# Patient Record
Sex: FEMALE | Race: WHITE | Employment: OTHER | URBAN - METROPOLITAN AREA
[De-identification: names, ages, dates, MRNs, and addresses within clinical notes are randomized per-mention and may not be internally consistent; named-entity substitution may affect disease eponyms.]

---

## 2017-06-22 ENCOUNTER — TELEPHONE (OUTPATIENT)
Dept: PULMONOLOGY | Age: 67
End: 2017-06-22

## 2017-10-18 ENCOUNTER — TELEPHONE (OUTPATIENT)
Dept: PULMONOLOGY | Age: 67
End: 2017-10-18

## 2017-10-18 PROBLEM — J18.9 PNEUMONIA DUE TO ORGANISM: Status: ACTIVE | Noted: 2017-10-18

## 2017-10-18 PROBLEM — R07.9 CHEST PAIN: Status: ACTIVE | Noted: 2017-10-18

## 2017-10-18 RX ORDER — PREDNISONE 10 MG/1
10 TABLET ORAL 3 TIMES DAILY
Qty: 15 TABLET | Refills: 0 | Status: ON HOLD | OUTPATIENT
Start: 2017-10-18 | End: 2017-10-23 | Stop reason: HOSPADM

## 2017-10-18 RX ORDER — IPRATROPIUM BROMIDE AND ALBUTEROL SULFATE 2.5; .5 MG/3ML; MG/3ML
3 SOLUTION RESPIRATORY (INHALATION) EVERY 6 HOURS PRN
Qty: 360 ML | Refills: 0 | Status: SHIPPED | OUTPATIENT
Start: 2017-10-18 | End: 2017-12-13 | Stop reason: SDUPTHER

## 2017-10-18 RX ORDER — DOXYCYCLINE HYCLATE 100 MG/1
100 CAPSULE ORAL 2 TIMES DAILY
Qty: 14 CAPSULE | Refills: 0 | Status: ON HOLD | OUTPATIENT
Start: 2017-10-18 | End: 2017-10-23 | Stop reason: HOSPADM

## 2017-10-18 NOTE — TELEPHONE ENCOUNTER
Patient and patient's daughter Wilberto Andesron informed of 's response with verbal understanding. Orders pending please sign. Patient wants to know if okay to take Duoneb as well for current symptoms? Patient request order send to pharmacy if okay.

## 2017-10-18 NOTE — TELEPHONE ENCOUNTER
Do you have the following symptoms? Shortness of Breath  Yes  Wheezing Yes  Cough Yes                  Cough Characteristics:                           Productive    Yes                           Sputum Color    White to yellow brownish                           Hemoptysis   No                           Consistency of sputum   No    Fever:   No  Temp:  Chills/Sweats:  No  What other symptoms are you having?: Patient states weak and hurts to take in a deep breath. Light headed when she gets up. How long have you had these symptoms? Times four days and getting worse per patient     Pharmacy: [facility]           Review medications and allergies: Allergies? Atenolol                   Currently on Antibiotics? (Drug/Dose/Frequency and how long on?)No                   Systemic Steroids? (Drug/Dose/Frequency and how long on?) Prednisone 4 mg daily     Pull in last office note. Assessment: 10/11/16 OV       1. Chronic bronchitis/cough with cylindrical bronchiectasis, much better on Daliresp      Not addressed today:  2. Rheumatoid arthritis on Embrel and MTX  3. Pulmonary Nodule 8 mm left lower lobe, has decreased in size on serial imaging  4. Positive tobacco history  5. Diabetes                          Plan:      1. Continue Daliresp; off Singulair  -- Failed Singulair, Failed Dulera, Failed Spiriva, Failed Advair.                             [facility]

## 2017-10-18 NOTE — TELEPHONE ENCOUNTER
Allergies   Allergen Reactions    Atenolol      Cough        Prednisone 30 mg daily for 5 days, then resume prior    Doxycycline 100 mg po bid for 7 days.

## 2017-10-19 PROBLEM — A41.9 SEPSIS (HCC): Status: ACTIVE | Noted: 2017-10-19

## 2017-10-19 PROBLEM — Z87.891 FORMER SMOKER: Chronic | Status: ACTIVE | Noted: 2017-10-19

## 2017-10-19 PROBLEM — R73.9 ACUTE HYPERGLYCEMIA: Status: ACTIVE | Noted: 2017-10-19

## 2017-10-19 PROBLEM — E87.8 HYPOCHLOREMIA: Status: ACTIVE | Noted: 2017-10-19

## 2017-10-19 PROBLEM — E87.1 HYPONATREMIA: Status: ACTIVE | Noted: 2017-10-19

## 2017-10-19 PROBLEM — E11.9 DM2 (DIABETES MELLITUS, TYPE 2) (HCC): Chronic | Status: ACTIVE | Noted: 2017-10-19

## 2017-10-19 PROBLEM — D64.9 CHRONIC ANEMIA: Chronic | Status: ACTIVE | Noted: 2017-10-19

## 2017-10-23 ENCOUNTER — TELEPHONE (OUTPATIENT)
Dept: PULMONOLOGY | Age: 67
End: 2017-10-23

## 2017-10-23 DIAGNOSIS — J18.9 COMMUNITY ACQUIRED PNEUMONIA OF LEFT LOWER LOBE OF LUNG: Primary | ICD-10-CM

## 2017-10-23 NOTE — TELEPHONE ENCOUNTER
Patient is currently IP. Assessment: Hospital Note  10/23/17  · Community acquired pneumonia, LLL  · Chronic bronchiectasis with acute exacerbation  · Cough  · Shortness of breath         Plan:  · ABX: CTX D#6 and completed azithromycin D5.   Give dose of CTX today and home with 3 additional days omnicef 300 mg po bid  · Inhaled bronchodilators   · Prednisone 30 mg daily, taper to off over 10 days  · Continue daliresp  · Dextromethorphan OTC  · See me in 4 weeks with CXR

## 2017-11-22 ENCOUNTER — OFFICE VISIT (OUTPATIENT)
Dept: PULMONOLOGY | Age: 67
End: 2017-11-22

## 2017-11-22 ENCOUNTER — HOSPITAL ENCOUNTER (OUTPATIENT)
Dept: OTHER | Age: 67
Discharge: HOME | End: 2017-11-22
Attending: INTERNAL MEDICINE | Admitting: INTERNAL MEDICINE

## 2017-11-22 VITALS
RESPIRATION RATE: 16 BRPM | BODY MASS INDEX: 28.92 KG/M2 | HEART RATE: 77 BPM | SYSTOLIC BLOOD PRESSURE: 139 MMHG | WEIGHT: 202 LBS | DIASTOLIC BLOOD PRESSURE: 76 MMHG | OXYGEN SATURATION: 98 % | HEIGHT: 70 IN | TEMPERATURE: 97.9 F

## 2017-11-22 DIAGNOSIS — J47.9 CYLINDRICAL BRONCHIECTASIS (HCC): ICD-10-CM

## 2017-11-22 DIAGNOSIS — J18.9 COMMUNITY ACQUIRED PNEUMONIA OF LEFT LOWER LOBE OF LUNG: ICD-10-CM

## 2017-11-22 DIAGNOSIS — R05.3 CHRONIC COUGH: ICD-10-CM

## 2017-11-22 DIAGNOSIS — J18.9 PNEUMONIA OF LEFT LOWER LOBE DUE TO INFECTIOUS ORGANISM: ICD-10-CM

## 2017-11-22 DIAGNOSIS — Z23 NEED FOR INFLUENZA VACCINATION: Primary | ICD-10-CM

## 2017-11-22 PROCEDURE — 99214 OFFICE O/P EST MOD 30 MIN: CPT | Performed by: INTERNAL MEDICINE

## 2017-11-22 PROCEDURE — 90662 IIV NO PRSV INCREASED AG IM: CPT | Performed by: INTERNAL MEDICINE

## 2017-11-22 PROCEDURE — G0008 ADMIN INFLUENZA VIRUS VAC: HCPCS | Performed by: INTERNAL MEDICINE

## 2017-11-22 RX ORDER — PREDNISONE 1 MG/1
4 TABLET ORAL DAILY
COMMUNITY
End: 2020-01-08

## 2017-11-22 NOTE — PROGRESS NOTES
CC: cough  HPI    Recent community-acquired pneumonia with acute exacerbation of bronchiectasis, hospitalized for 6 days. PREVIOUS HX  63 yo with 2 month h/o severe waxing and waning cough, treated with avelox without improvement and then changed to Z pac, some improvement; then treated with prednisone and Z pac and then clearly better, then returned and retreated with cough medicine and prednisone. No cough prior to 3 months ago except with sinus drainage. Cough has slowly been improving since last visit here. Objective:   Physical Exam  Blood pressure 139/76, pulse 77, temperature 97.9 °F (36.6 °C), temperature source Oral, resp. rate 16, height 5' 10\" (1.778 m), weight 202 lb (91.6 kg), SpO2 98 %. 'on RA  Constitutional:  No acute distress. HENT:  Oropharynx is clear and moist.   Eyes: Conjunctivae are normal. Pupils equal, round, and reactive to light. No scleral icterus. Neck: No tracheal deviation present. No obvious thyromegaly. Cardiovascular: Normal heart sounds. No right ventricular heave. Left greater than right lower extremity edema. Pulmonary/Chest: No wheezes. No rhonchi. No rales. No decreased breath sounds. No accessory muscle usage or stridor. Abdominal: Soft. Musculoskeletal: No cyanosis. No clubbing. Lymphadenopathy: No cervical or supraclavicular adenopathy. Skin: Skin is warm and dry. Psychiatric: Normal mood and affect. CXR 11/22/17  Resolution of left lower lobe infiltrate    HRCT 2014  Multiple parenchymal pulmonary cysts measuring up to 3 centimeters   with thinned but definable walls are unchanged from priors and had   a basilar prominence. These are more likely pneumatoceles rather   than cystic lung disease related to [facility] or [facility]. There are no thick   walled cavities or intracavitary fluid levels. There is mild   cylindrical bronchiectasis more pronounced in the lower lung   zones.  In the dependent right lower lobe there is a small   subpleural region of articulation and groundglass opacity end   there is a smaller region of groundglass opacity in the dorsal   base of the left lower lobe which is nonspecific but maybe early,   small foci of UIP related to the patient's diagnosis of rheumatoid   arthritis. There are multiple small nodules measuring up to 5 mm which are   nonsignificantly changed. No new or enlarging nodules or   consolidation. There is no significant mediastinal adenopathy or   hilar enlargement. Small paratracheal lymph nodes are unchanged   from January 2012   No pleural abnormalities and there is no focal air trapping. There   are mild changes of central lobular emphysema in the upper lung   zones. PFT Jan 2012  FEV1 2.2 L 77% nl ratio TLC 84% DLCO 18.29 88%  PFT 4-22-13  FEV1 2.26 L 70%  TLC 92% DLCO 19.08 69%  PFT Mar 2014  FEV1 2.08 L 65%  TLC 5.63 88% DLCO 20.05 73%  PFT June 2014 FEV1 2.12 L 69%  TLC 6.14 L 99% DLCO 18.31 68%  PFT April 2016 FEV1 2.24 L FVC 3.2 L TLC 6.36 l DLCO 19.16 72%    Pertussis antibodies are positive    CXR 2-10-16 possible basilar airspace disease seen on lateral view  Assessment:      1. Chronic bronchitis/cough with cylindrical bronchiectasis, Has done well on Daliresp   2. Community acquired pneumonia/abnormal chest x-ray: Is clearly improved on today's imaging  2. Rheumatoid arthritis on Embrel and MTX  3. Pulmonary Nodule 8 mm left lower lobe, has decreased in size on serial imaging  4. Positive tobacco history, 20 pack year quit in 1991  5. Diabetes      Plan:      1. Continue Daliresp; off Singulair  -- Failed Singulair, Failed Dulera, Failed Spiriva, Failed Advair. 2.  No additional follow-up imaging is required  3.   Can see me in 6 months        Best Jang

## 2017-12-15 RX ORDER — IPRATROPIUM BROMIDE AND ALBUTEROL SULFATE 2.5; .5 MG/3ML; MG/3ML
SOLUTION RESPIRATORY (INHALATION)
Qty: 360 ML | Refills: 5 | Status: SHIPPED | OUTPATIENT
Start: 2017-12-15 | End: 2019-07-06

## 2018-05-21 ENCOUNTER — TELEPHONE (OUTPATIENT)
Dept: PULMONOLOGY | Age: 68
End: 2018-05-21

## 2018-06-06 ENCOUNTER — TELEPHONE (OUTPATIENT)
Dept: PULMONOLOGY | Age: 68
End: 2018-06-06

## 2018-06-19 ENCOUNTER — OFFICE VISIT (OUTPATIENT)
Dept: PULMONOLOGY | Age: 68
End: 2018-06-19

## 2018-06-19 VITALS
DIASTOLIC BLOOD PRESSURE: 64 MMHG | HEIGHT: 71 IN | BODY MASS INDEX: 28.7 KG/M2 | SYSTOLIC BLOOD PRESSURE: 122 MMHG | HEART RATE: 87 BPM | WEIGHT: 205 LBS | OXYGEN SATURATION: 98 %

## 2018-06-19 DIAGNOSIS — J42 CHRONIC BRONCHITIS WITH ACUTE EXACERBATION (HCC): ICD-10-CM

## 2018-06-19 DIAGNOSIS — J20.9 CHRONIC BRONCHITIS WITH ACUTE EXACERBATION (HCC): ICD-10-CM

## 2018-06-19 DIAGNOSIS — R05.3 CHRONIC COUGH: Primary | ICD-10-CM

## 2018-06-19 PROCEDURE — 99213 OFFICE O/P EST LOW 20 MIN: CPT | Performed by: INTERNAL MEDICINE

## 2018-06-19 RX ORDER — BLOOD SUGAR DIAGNOSTIC
STRIP MISCELLANEOUS
COMMUNITY
Start: 2018-05-30

## 2018-06-19 RX ORDER — ALBUTEROL SULFATE 90 UG/1
2 AEROSOL, METERED RESPIRATORY (INHALATION) EVERY 4 HOURS PRN
Qty: 1 INHALER | Refills: 3 | Status: SHIPPED | OUTPATIENT
Start: 2018-06-19 | End: 2020-01-20

## 2018-06-19 RX ORDER — PREDNISONE 10 MG/1
TABLET ORAL
Qty: 21 TABLET | Refills: 0 | Status: SHIPPED | OUTPATIENT
Start: 2018-06-19 | End: 2018-12-19 | Stop reason: ALTCHOICE

## 2018-06-19 RX ORDER — CYCLOBENZAPRINE HCL 10 MG
10 TABLET ORAL 2 TIMES DAILY PRN
Status: ON HOLD | COMMUNITY
End: 2021-12-28 | Stop reason: HOSPADM

## 2018-06-19 RX ORDER — BLOOD SUGAR DIAGNOSTIC
STRIP MISCELLANEOUS
Refills: 3 | COMMUNITY
Start: 2018-06-01

## 2018-06-19 RX ORDER — FUROSEMIDE 40 MG/1
40 TABLET ORAL 2 TIMES DAILY
Status: ON HOLD | COMMUNITY
End: 2019-07-09 | Stop reason: SDUPTHER

## 2018-06-19 RX ORDER — HYDROCODONE BITARTRATE AND ACETAMINOPHEN 7.5; 325 MG/1; MG/1
TABLET ORAL
Refills: 0 | COMMUNITY
Start: 2018-05-23 | End: 2020-11-19 | Stop reason: CLARIF

## 2018-06-19 RX ORDER — MELOXICAM 15 MG/1
TABLET ORAL
COMMUNITY
Start: 2018-04-07 | End: 2021-08-04

## 2018-09-14 ENCOUNTER — HOSPITAL ENCOUNTER (OUTPATIENT)
Dept: WOMENS IMAGING | Age: 68
Discharge: HOME | End: 2018-09-14
Payer: MEDICARE

## 2018-09-14 DIAGNOSIS — Z12.31 ENCOUNTER FOR SCREENING MAMMOGRAM FOR MALIGNANT NEOPLASM OF BREAST: ICD-10-CM

## 2018-09-14 PROCEDURE — 77063 BREAST TOMOSYNTHESIS BI: CPT

## 2018-09-20 ENCOUNTER — HOSPITAL ENCOUNTER (OUTPATIENT)
Dept: WOMENS IMAGING | Age: 68
Discharge: HOME | End: 2018-09-20
Payer: MEDICARE

## 2018-09-20 DIAGNOSIS — R92.8 ABNORMAL MAMMOGRAM: ICD-10-CM

## 2018-09-20 PROCEDURE — G0279 TOMOSYNTHESIS, MAMMO: HCPCS

## 2018-11-28 RX ORDER — AZITHROMYCIN 250 MG/1
TABLET, FILM COATED ORAL
Qty: 1 PACKET | Refills: 0 | Status: SHIPPED | OUTPATIENT
Start: 2018-11-28 | End: 2018-12-08

## 2018-11-28 RX ORDER — PREDNISONE 10 MG/1
TABLET ORAL
Qty: 30 TABLET | Refills: 0 | Status: SHIPPED | OUTPATIENT
Start: 2018-11-28 | End: 2018-12-10

## 2018-12-19 ENCOUNTER — OFFICE VISIT (OUTPATIENT)
Dept: PULMONOLOGY | Age: 68
End: 2018-12-19
Payer: MEDICARE

## 2018-12-19 VITALS
SYSTOLIC BLOOD PRESSURE: 150 MMHG | HEART RATE: 80 BPM | BODY MASS INDEX: 29.26 KG/M2 | RESPIRATION RATE: 16 BRPM | HEIGHT: 71 IN | TEMPERATURE: 98.3 F | OXYGEN SATURATION: 97 % | WEIGHT: 209 LBS | DIASTOLIC BLOOD PRESSURE: 82 MMHG

## 2018-12-19 DIAGNOSIS — J47.9 CYLINDRICAL BRONCHIECTASIS (HCC): Primary | ICD-10-CM

## 2018-12-19 PROCEDURE — 99213 OFFICE O/P EST LOW 20 MIN: CPT | Performed by: INTERNAL MEDICINE

## 2018-12-19 RX ORDER — IPRATROPIUM BROMIDE 42 UG/1
2 SPRAY, METERED NASAL 4 TIMES DAILY
Qty: 1 BOTTLE | Refills: 5 | Status: SHIPPED | OUTPATIENT
Start: 2018-12-19 | End: 2019-04-04 | Stop reason: SDUPTHER

## 2018-12-19 RX ORDER — ATORVASTATIN CALCIUM 40 MG/1
40 TABLET, FILM COATED ORAL
COMMUNITY
Start: 2018-10-16

## 2019-04-04 RX ORDER — IPRATROPIUM BROMIDE 42 UG/1
SPRAY, METERED NASAL
Qty: 1 BOTTLE | Refills: 5 | Status: SHIPPED | OUTPATIENT
Start: 2019-04-04 | End: 2020-02-25

## 2019-05-07 ENCOUNTER — TELEPHONE (OUTPATIENT)
Dept: PULMONOLOGY | Age: 69
End: 2019-05-07

## 2019-05-07 RX ORDER — DOXYCYCLINE HYCLATE 100 MG
100 TABLET ORAL 2 TIMES DAILY
Qty: 14 TABLET | Refills: 0 | Status: SHIPPED | OUTPATIENT
Start: 2019-05-07 | End: 2019-05-14

## 2019-05-07 RX ORDER — PREDNISONE 10 MG/1
TABLET ORAL
Qty: 15 TABLET | Refills: 0 | Status: SHIPPED | OUTPATIENT
Start: 2019-05-07 | End: 2019-05-15 | Stop reason: CLARIF

## 2019-05-07 NOTE — TELEPHONE ENCOUNTER
Do you have the following symptoms? Shortness of Breath  yes  Wheezing  yes  Cough  yes                  Cough Characteristics:                           Productive    yes                           Sputum Color    Green                            Hemoptysis   No                            Consistency of sputum   Thick      Fever:    No   Temp:n/a  Chills/Sweats:  No   What other symptoms are you having?:  No     How long have you had these symptoms? 6 weeks      Pharmacy: [facility] in [place] [id number]          Review medications and allergies: Allergies? Allergies   Allergen Reactions    Atenolol      Cough         Currently on Antibiotics? (Drug/Dose/Frequency and how long on?) no   Systemic Steroids? (Drug/Dose/Frequency and how long on?) 4 mg daily for arthritis       Last sick call taken on 6/6/18. Meds prescribed were antibiotic/prednisone). Encourage patient to use nebulizer every 4-6 hours as needed. LOV: 12/19/18  Assessment:   1. Chronic bronchitis/cough with cylindrical bronchiectasis, Has done well on Daliresp       Not addressed today  2. Rheumatoid arthritis on Embrel and MTX  3. Pulmonary Nodule 8 mm left lower lobe, has decreased in size on serial imaging  4. Positive tobacco history, 20 pack year quit in 1991  5. Diabetes                Plan:   1. Continue Daliresp; off Singulair  -- Failed Singulair, Failed Dulera, Failed Spiriva, Failed Advair. 2.  No additional follow-up imaging is required  3.   Can see me in 6 months

## 2019-05-14 ENCOUNTER — TELEPHONE (OUTPATIENT)
Dept: PULMONOLOGY | Age: 69
End: 2019-05-14

## 2019-05-14 DIAGNOSIS — R05.9 COUGH: Primary | ICD-10-CM

## 2019-05-14 NOTE — TELEPHONE ENCOUNTER
Patient informed of 's response with verbal understanding. Orders are pending please review and sign.

## 2019-05-15 ENCOUNTER — HOSPITAL ENCOUNTER (OUTPATIENT)
Dept: GENERAL RADIOLOGY | Age: 69
Discharge: HOME | End: 2019-05-15
Payer: MEDICARE

## 2019-05-15 ENCOUNTER — HOSPITAL ENCOUNTER (OUTPATIENT)
Age: 69
Discharge: HOME | End: 2019-05-15
Payer: MEDICARE

## 2019-05-15 ENCOUNTER — OFFICE VISIT (OUTPATIENT)
Dept: PULMONOLOGY | Age: 69
End: 2019-05-15
Payer: MEDICARE

## 2019-05-15 VITALS
HEIGHT: 71 IN | BODY MASS INDEX: 28.28 KG/M2 | TEMPERATURE: 98.2 F | RESPIRATION RATE: 16 BRPM | OXYGEN SATURATION: 93 % | WEIGHT: 202 LBS | DIASTOLIC BLOOD PRESSURE: 77 MMHG | HEART RATE: 86 BPM | SYSTOLIC BLOOD PRESSURE: 138 MMHG

## 2019-05-15 DIAGNOSIS — R05.9 COUGH: ICD-10-CM

## 2019-05-15 DIAGNOSIS — R93.89 ABNORMAL CXR: Primary | ICD-10-CM

## 2019-05-15 DIAGNOSIS — J42 CHRONIC BRONCHITIS, UNSPECIFIED CHRONIC BRONCHITIS TYPE (HCC): ICD-10-CM

## 2019-05-15 PROCEDURE — 71046 X-RAY EXAM CHEST 2 VIEWS: CPT

## 2019-05-15 PROCEDURE — 99214 OFFICE O/P EST MOD 30 MIN: CPT | Performed by: INTERNAL MEDICINE

## 2019-05-15 RX ORDER — PREDNISONE 10 MG/1
TABLET ORAL
Qty: 30 TABLET | Refills: 0 | Status: SHIPPED | OUTPATIENT
Start: 2019-05-15 | End: 2019-06-04 | Stop reason: CLARIF

## 2019-05-15 RX ORDER — LEVOFLOXACIN 500 MG/1
500 TABLET, FILM COATED ORAL DAILY
Qty: 10 TABLET | Refills: 0 | Status: SHIPPED | OUTPATIENT
Start: 2019-05-15 | End: 2019-05-25

## 2019-05-15 NOTE — PROGRESS NOTES
doxy    Not addressed today  2. Rheumatoid arthritis on Embrel and MTX  3. Pulmonary Nodule 8 mm left lower lobe, has decreased in size on serial imaging  4. Positive tobacco history, 20 pack year quit in 1991  5. Diabetes      Plan:      1. Continue Daliresp; off Singulair  -- Failed Singulair, Failed Dulera, Failed Spiriva, Failed Advair. 2.  CT CHEST now no IV dye  3. Call for result of CT  4. Start Levaquin and prednisone   5.   Sputum culture        Rennie Habermann

## 2019-05-15 NOTE — PATIENT INSTRUCTIONS
Start Levaquin and prednisone 30 today    Get CT, call with result.   Can update Dr. Ady Otero at that point on if doing better, then update around 7 days to decide what to do next with prednisone

## 2019-05-16 ENCOUNTER — TELEPHONE (OUTPATIENT)
Dept: PULMONOLOGY | Age: 69
End: 2019-05-16

## 2019-05-16 PROCEDURE — 87205 SMEAR GRAM STAIN: CPT

## 2019-05-16 PROCEDURE — 87070 CULTURE OTHR SPECIMN AEROBIC: CPT

## 2019-05-16 RX ORDER — FLUCONAZOLE 100 MG/1
100 TABLET ORAL DAILY
Qty: 8 TABLET | Refills: 0 | Status: SHIPPED | OUTPATIENT
Start: 2019-05-16 | End: 2019-05-23

## 2019-05-16 NOTE — TELEPHONE ENCOUNTER
Char is on back order need different med. Pt states she saw Dr Blake White yesterday , requesting message to Dr Blake White to please order something for her \"thrush\".     Pharmacy : Pharmacy:  [id number], [address] 05/15/19  Assessment:   1.  Chronic bronchitis/cough with cylindrical bronchiectasis, Has done well on Daliresp  - now with acute exacerbation   2.  Abnormal CXR  3.  Community acquired pneumonia - failed doxy     Not addressed today  2.  Rheumatoid arthritis on Embrel and MTX  3.  Pulmonary Nodule 8 mm left lower lobe, has decreased in size on serial imaging  4.  Positive tobacco history, 20 pack year quit in 1991  5.  Diabetes     Plan:   1.  Continue Daliresp; off Singulair  -- Failed Singulair, Failed Dulera, Failed Spiriva, Failed Advair.    2.  CT CHEST now no IV dye  3.  Call for result of CT  4.  Start Levaquin and prednisone   5.  Sputum culture                                                  5/16/19 2:32 PM   Royal Alex LPN routed this conversation to [facility]  Haidee Bamberger, MD Haidee Bamberger, MD   to [facility]         5/16/19 3:54 PM   Note      Nystatin sent, confirm pharmacy has available. [id number] [facility] [id number] [place]           5/16/19 4:15 PM   Note      Patient informed of 's response with verbal understanding.

## 2019-05-17 ENCOUNTER — HOSPITAL ENCOUNTER (OUTPATIENT)
Age: 69
Setting detail: SPECIMEN
Discharge: HOME | End: 2019-05-17
Payer: MEDICARE

## 2019-05-17 DIAGNOSIS — J42 CHRONIC BRONCHITIS, UNSPECIFIED CHRONIC BRONCHITIS TYPE (HCC): ICD-10-CM

## 2019-05-17 DIAGNOSIS — R05.9 COUGH: Primary | ICD-10-CM

## 2019-05-17 DIAGNOSIS — R05.9 COUGH: ICD-10-CM

## 2019-05-17 PROCEDURE — 87070 CULTURE OTHR SPECIMN AEROBIC: CPT

## 2019-05-17 PROCEDURE — 87205 SMEAR GRAM STAIN: CPT

## 2019-05-19 LAB
CULTURE, RESPIRATORY: NORMAL
GRAM STAIN RESULT: NORMAL

## 2019-05-20 ENCOUNTER — HOSPITAL ENCOUNTER (OUTPATIENT)
Dept: CT IMAGING | Age: 69
Discharge: HOME | End: 2019-05-20
Payer: MEDICARE

## 2019-05-20 DIAGNOSIS — R05.9 COUGH: ICD-10-CM

## 2019-05-20 DIAGNOSIS — R93.89 ABNORMAL CXR: ICD-10-CM

## 2019-05-20 PROCEDURE — 71250 CT THORAX DX C-: CPT

## 2019-05-20 NOTE — RESULT ENCOUNTER NOTE
Tell pt CT shows changes of infection - could be bacterial or viral.   Levaquin should cover any bacterial etiology.

## 2019-06-04 ENCOUNTER — HOSPITAL ENCOUNTER (OUTPATIENT)
Age: 69
Discharge: HOME | End: 2019-06-04
Payer: MEDICARE

## 2019-06-04 ENCOUNTER — OFFICE VISIT (OUTPATIENT)
Dept: PULMONOLOGY | Age: 69
End: 2019-06-04
Payer: MEDICARE

## 2019-06-04 VITALS
SYSTOLIC BLOOD PRESSURE: 135 MMHG | RESPIRATION RATE: 16 BRPM | WEIGHT: 209 LBS | HEART RATE: 96 BPM | DIASTOLIC BLOOD PRESSURE: 69 MMHG | HEIGHT: 71 IN | TEMPERATURE: 98.1 F | BODY MASS INDEX: 29.26 KG/M2 | OXYGEN SATURATION: 96 %

## 2019-06-04 DIAGNOSIS — R60.0 LOWER EXTREMITY EDEMA: ICD-10-CM

## 2019-06-04 DIAGNOSIS — R93.89 ABNORMAL CT OF THE CHEST: ICD-10-CM

## 2019-06-04 DIAGNOSIS — Z51.81 THERAPEUTIC DRUG MONITORING: ICD-10-CM

## 2019-06-04 DIAGNOSIS — J47.9 CYLINDRICAL BRONCHIECTASIS (HCC): Primary | ICD-10-CM

## 2019-06-04 LAB
ALBUMIN SERPL-MCNC: 3.3 G/DL (ref 3.4–5)
ANION GAP SERPL CALCULATED.3IONS-SCNC: 14 MMOL/L (ref 3–16)
BUN BLDV-MCNC: 30 MG/DL (ref 7–20)
CALCIUM SERPL-MCNC: 8.8 MG/DL (ref 8.3–10.6)
CHLORIDE BLD-SCNC: 93 MMOL/L (ref 99–110)
CO2: 26 MMOL/L (ref 21–32)
CREAT SERPL-MCNC: 1.5 MG/DL (ref 0.6–1.2)
GFR AFRICAN AMERICAN: 42
GFR NON-AFRICAN AMERICAN: 35
GLUCOSE BLD-MCNC: 229 MG/DL (ref 70–99)
PHOSPHORUS: 3.2 MG/DL (ref 2.5–4.9)
POTASSIUM SERPL-SCNC: 4.6 MMOL/L (ref 3.5–5.1)
SODIUM BLD-SCNC: 133 MMOL/L (ref 136–145)

## 2019-06-04 PROCEDURE — 80069 RENAL FUNCTION PANEL: CPT

## 2019-06-04 PROCEDURE — 36415 COLL VENOUS BLD VENIPUNCTURE: CPT

## 2019-06-04 PROCEDURE — 99214 OFFICE O/P EST MOD 30 MIN: CPT | Performed by: INTERNAL MEDICINE

## 2019-06-04 RX ORDER — METOLAZONE 5 MG/1
5 TABLET ORAL DAILY
Qty: 30 TABLET | Refills: 0 | Status: SHIPPED | OUTPATIENT
Start: 2019-06-04 | End: 2019-06-26 | Stop reason: SDUPTHER

## 2019-06-04 NOTE — PATIENT INSTRUCTIONS
Check weight every day 1st thing in the morning, record  Goal is to drop 7-10 pounds of fluid over the next 3-4 days. If breathing/cough worsen despite fluid off, let Dr. Ady Otero know.     If feeling better, then goal is to stay at the same weight and f/u with Dr. Sanjuana Dunham

## 2019-06-04 NOTE — PROGRESS NOTES
CC: cough  HPI    Treated in March with prednisone and Zpac, then received prednisone/doxy early May 2019 and at first was getting better, but beginning Shane May 10 developed fatigue, malaise, worsening cough, chest congestion. She was treated by me for acute pneumonia with Levaquin and prednisone with improvement. Subsequently 4 days ago she noted that despite taking Lasix 80 BID + spironolactone, has stopped making much urine with this and has 4 day h/o markedly increased lower extremity edema. She has associated shortness of breath and cough. PREVIOUS HX  61 yo with 2 month h/o severe waxing and waning cough, treated with avelox without improvement and then changed to Z pac, some improvement; then treated with prednisone and Z pac and then clearly better, then returned and retreated with cough medicine and prednisone. No cough prior to 3 months ago except with sinus drainage. Cough has slowly been improving since last visit here. Objective:   Physical Exam  Blood pressure 135/69, pulse 96, temperature 98.1 °F (36.7 °C), temperature source Oral, resp. rate 16, height 5' 10.5\" (1.791 m), weight 209 lb (94.8 kg), SpO2 96 %. 'on RA  Constitutional:  No acute distress. HENT:  Oropharynx is clear and moist.   Eyes: Conjunctivae are normal. Pupils equal, round, and reactive to light. No scleral icterus. Neck: No tracheal deviation present. No obvious thyromegaly. Cardiovascular: Normal heart sounds. No right ventricular heave. +++ Left greater than right lower extremity edema. Pulmonary/Chest: diffuse wheezes. No rhonchi. LLL rales. No decreased breath sounds. No accessory muscle usage or stridor. Abdominal: Soft. Musculoskeletal: No cyanosis. No clubbing. Lymphadenopathy: No cervical or supraclavicular adenopathy. Skin: Skin is warm and dry. Psychiatric: Normal mood and affect.     CXR 6/3/18  Possible bilataral LL pneumonia v atelectasis    HRCT 2014  Multiple parenchymal pulmonary cysts measuring up to 3 centimeters   with thinned but definable walls are unchanged from priors and had   a basilar prominence. These are more likely pneumatoceles rather   than cystic lung disease related to [facility] or [facility]. There are no thick   walled cavities or intracavitary fluid levels. There is mild   cylindrical bronchiectasis more pronounced in the lower lung   zones. In the dependent right lower lobe there is a small   subpleural region of articulation and groundglass opacity end   there is a smaller region of groundglass opacity in the dorsal   base of the left lower lobe which is nonspecific but maybe early,   small foci of UIP related to the patient's diagnosis of rheumatoid   arthritis. There are multiple small nodules measuring up to 5 mm which are   nonsignificantly changed. No new or enlarging nodules or   consolidation. There is no significant mediastinal adenopathy or   hilar enlargement. Small paratracheal lymph nodes are unchanged   from January 2012   No pleural abnormalities and there is no focal air trapping. There   are mild changes of central lobular emphysema in the upper lung   zones. PFT Jan 2012  FEV1 2.2 L 77% nl ratio TLC 84% DLCO 18.29 88%  PFT 4-22-13  FEV1 2.26 L 70%  TLC 92% DLCO 19.08 69%  PFT Mar 2014  FEV1 2.08 L 65%  TLC 5.63 88% DLCO 20.05 73%  PFT June 2014 FEV1 2.12 L 69%  TLC 6.14 L 99% DLCO 18.31 68%  PFT April 2016 FEV1 2.24 L FVC 3.2 L TLC 6.36 l DLCO 19.16 72%    Pertussis antibodies are positive    CT CHEST 5/20/19  Impression   1. Multiple new nonspecific ground-glass nodular opacities throughout both   lungs, with more amorphous ground-glass opacities noted within the anterior   right upper lobe.  These are most likely infectious or inflammatory in   etiology.  Suggest appropriate clinical treatment, and short-term chest CT   follow-up in 6-8 weeks to ensure resolution of these multifocal nodular   opacities.    2. Mild subpleural atelectasis within the bilateral lower lobes, without   acute airspace consolidation. 3. Mild chronic bibasilar pleural and parenchymal scarring. 4. Multiple stable chronic pneumatoceles within the bilateral lung bases. Assessment:      1. Chronic bronchitis/cough with cylindrical bronchiectasis, Has done well on Daliresp    2. Abnormal CT CHEST  3. Lower extremity edema, markedly worse with dropping urine output   4. Need for therapeutic drug monitoring    Not addressed today  · Rheumatoid arthritis on Embrel and MTX  · Pulmonary Nodule 8 mm left lower lobe, has decreased in size on serial imaging  · Positive tobacco history, 20 pack year quit in 1991  · Diabetes      Plan:      1. Renal panel today   Add metolazone 5 mg daily for 3 days. Daily weights and goal drop 7-10 pounds   Call with result. His shortness of breath and cough not improving consider fiberoptic bronchoscopy  Continue Daliresp; off Singulair  -- Failed Singulair, Failed Dulera, Failed Spiriva, Failed Advair.     2.  Will need to schedule follow-up CT imaging at follow-up visit        Kenya Newman

## 2019-06-05 ENCOUNTER — TELEPHONE (OUTPATIENT)
Dept: PULMONOLOGY | Age: 69
End: 2019-06-05

## 2019-06-05 NOTE — RESULT ENCOUNTER NOTE
Tell pt her kidney function is significant worsened compared with 1 year prior. She needs a repeat STAT renal ordered for tomorrow, please text result to my cell phone.

## 2019-06-06 ENCOUNTER — HOSPITAL ENCOUNTER (OUTPATIENT)
Age: 69
Discharge: HOME | End: 2019-06-06
Payer: MEDICARE

## 2019-06-06 ENCOUNTER — TELEPHONE (OUTPATIENT)
Dept: PULMONOLOGY | Age: 69
End: 2019-06-06

## 2019-06-06 DIAGNOSIS — R94.4 ABNORMAL KIDNEY FUNCTION STUDY: Primary | ICD-10-CM

## 2019-06-06 DIAGNOSIS — R94.4 ABNORMAL KIDNEY FUNCTION STUDY: ICD-10-CM

## 2019-06-06 LAB
ALBUMIN SERPL-MCNC: 3.4 G/DL (ref 3.4–5)
ANION GAP SERPL CALCULATED.3IONS-SCNC: 13 MMOL/L (ref 3–16)
BUN BLDV-MCNC: 28 MG/DL (ref 7–20)
CALCIUM SERPL-MCNC: 9.1 MG/DL (ref 8.3–10.6)
CHLORIDE BLD-SCNC: 91 MMOL/L (ref 99–110)
CO2: 31 MMOL/L (ref 21–32)
CREAT SERPL-MCNC: 1.3 MG/DL (ref 0.6–1.2)
GFR AFRICAN AMERICAN: 49
GFR NON-AFRICAN AMERICAN: 41
GLUCOSE BLD-MCNC: 167 MG/DL (ref 70–99)
PHOSPHORUS: 3.9 MG/DL (ref 2.5–4.9)
POTASSIUM SERPL-SCNC: 4.2 MMOL/L (ref 3.5–5.1)
SODIUM BLD-SCNC: 135 MMOL/L (ref 136–145)

## 2019-06-06 PROCEDURE — 36415 COLL VENOUS BLD VENIPUNCTURE: CPT

## 2019-06-06 PROCEDURE — 80069 RENAL FUNCTION PANEL: CPT

## 2019-06-10 ENCOUNTER — HOSPITAL ENCOUNTER (OUTPATIENT)
Age: 69
Discharge: HOME | End: 2019-06-10
Payer: MEDICARE

## 2019-06-10 DIAGNOSIS — R94.4 ABNORMAL KIDNEY FUNCTION STUDY: ICD-10-CM

## 2019-06-10 LAB
ALBUMIN SERPL-MCNC: 3.3 G/DL (ref 3.4–5)
ANION GAP SERPL CALCULATED.3IONS-SCNC: 14 MMOL/L (ref 3–16)
BUN BLDV-MCNC: 30 MG/DL (ref 7–20)
CALCIUM SERPL-MCNC: 9 MG/DL (ref 8.3–10.6)
CHLORIDE BLD-SCNC: 87 MMOL/L (ref 99–110)
CO2: 30 MMOL/L (ref 21–32)
CREAT SERPL-MCNC: 1.2 MG/DL (ref 0.6–1.2)
GFR AFRICAN AMERICAN: 54
GFR NON-AFRICAN AMERICAN: 45
GLUCOSE BLD-MCNC: 222 MG/DL (ref 70–99)
PHOSPHORUS: 3.6 MG/DL (ref 2.5–4.9)
POTASSIUM SERPL-SCNC: 3.8 MMOL/L (ref 3.5–5.1)
SODIUM BLD-SCNC: 131 MMOL/L (ref 136–145)

## 2019-06-10 PROCEDURE — 80069 RENAL FUNCTION PANEL: CPT

## 2019-06-10 PROCEDURE — 36415 COLL VENOUS BLD VENIPUNCTURE: CPT

## 2019-06-10 NOTE — RESULT ENCOUNTER NOTE
Tell pt her kidney function has improved over the last 6 days. Has her weight come down, does she have less swelling? How is her cough?

## 2019-06-11 ENCOUNTER — HOSPITAL ENCOUNTER (OUTPATIENT)
Age: 69
Discharge: HOME | End: 2019-06-11
Payer: MEDICARE

## 2019-06-11 ENCOUNTER — HOSPITAL ENCOUNTER (OUTPATIENT)
Dept: GENERAL RADIOLOGY | Age: 69
Discharge: HOME | End: 2019-06-11
Payer: MEDICARE

## 2019-06-11 ENCOUNTER — OFFICE VISIT (OUTPATIENT)
Dept: PULMONOLOGY | Age: 69
End: 2019-06-11
Payer: MEDICARE

## 2019-06-11 VITALS
OXYGEN SATURATION: 97 % | DIASTOLIC BLOOD PRESSURE: 70 MMHG | TEMPERATURE: 98.4 F | SYSTOLIC BLOOD PRESSURE: 128 MMHG | RESPIRATION RATE: 16 BRPM | WEIGHT: 202 LBS | HEART RATE: 79 BPM | HEIGHT: 71 IN | BODY MASS INDEX: 28.28 KG/M2

## 2019-06-11 DIAGNOSIS — R93.89 ABNORMAL CT OF THE CHEST: ICD-10-CM

## 2019-06-11 DIAGNOSIS — J42 CHRONIC BRONCHITIS, UNSPECIFIED CHRONIC BRONCHITIS TYPE (HCC): Primary | ICD-10-CM

## 2019-06-11 DIAGNOSIS — R05.9 COUGH: ICD-10-CM

## 2019-06-11 DIAGNOSIS — J42 CHRONIC BRONCHITIS, UNSPECIFIED CHRONIC BRONCHITIS TYPE (HCC): ICD-10-CM

## 2019-06-11 DIAGNOSIS — R94.4 ABNORMAL RENAL FUNCTION TEST: ICD-10-CM

## 2019-06-11 PROCEDURE — 71046 X-RAY EXAM CHEST 2 VIEWS: CPT

## 2019-06-11 PROCEDURE — 99214 OFFICE O/P EST MOD 30 MIN: CPT | Performed by: INTERNAL MEDICINE

## 2019-06-11 RX ORDER — PREDNISONE 10 MG/1
TABLET ORAL
Qty: 30 TABLET | Refills: 0 | Status: SHIPPED | OUTPATIENT
Start: 2019-06-11 | End: 2019-07-06

## 2019-06-11 NOTE — PATIENT INSTRUCTIONS
Take one more dose metolazone to target weight of 200 lb. Now. In future, if weight goes above 202#, give one dose of metolazone. If you need more than 2 doses of metolazone in one week, call your physician.     Set up an appointment with either Dr. Merry Amanda or Dr. Obdulio Daigle    CXR today    Fiberoptic bronchoscopy this week, blood draw same day (can be done in endoscopy)

## 2019-06-11 NOTE — PROGRESS NOTES
CC: cough  HPI    Interval History #2:  With loss of 10 pounds of fluid with metolazone given at last visit, her kidney function actually improved. However, her cough and shortness of breath are same. Severe cough, congested. She continues on embrel and MTX. Interval History #1:Treated in March with prednisone and Zpac, then received prednisone/doxy early May 2019 and at first was getting better, but beginning Shane May 10 developed fatigue, malaise, worsening cough, chest congestion. She was treated by me for acute pneumonia with Levaquin and prednisone with improvement. Subsequently 4 days ago she noted that despite taking Lasix 80 BID + spironolactone, has stopped making much urine with this and has 4 day h/o markedly increased lower extremity edema. She has associated shortness of breath and cough. Cough is described as severe. PREVIOUS HX  61 yo with 2 month h/o severe waxing and waning cough, treated with avelox without improvement and then changed to Z pac, some improvement; then treated with prednisone and Z pac and then clearly better, then returned and retreated with cough medicine and prednisone. No cough prior to 3 months ago except with sinus drainage. Cough has slowly been improving since last visit here. Objective:   Physical Exam  Blood pressure 128/70, pulse 79, temperature 98.4 °F (36.9 °C), temperature source Oral, resp. rate 16, height 5' 10.5\" (1.791 m), weight 202 lb (91.6 kg), SpO2 97 %. 'on RA  Constitutional:  No acute distress. HENT:  Oropharynx is clear and moist.   Eyes: Conjunctivae are normal. Pupils equal, round, and reactive to light. No scleral icterus. Neck: No tracheal deviation present. No obvious thyromegaly. Cardiovascular: Normal heart sounds. No right ventricular heave. +++ Left greater than right lower extremity edema. Pulmonary/Chest: diffuse wheezes. No rhonchi. LLL rales. No decreased breath sounds. No accessory muscle usage or stridor. Abdominal: Soft. Musculoskeletal: No cyanosis. No clubbing. Lymphadenopathy: No cervical or supraclavicular adenopathy. Skin: Skin is warm and dry. Psychiatric: Normal mood and affect. CXR 6/3/18  Possible bilataral LL pneumonia v atelectasis    HRCT 2014  Multiple parenchymal pulmonary cysts measuring up to 3 centimeters   with thinned but definable walls are unchanged from priors and had   a basilar prominence. These are more likely pneumatoceles rather   than cystic lung disease related to [facility] or [facility]. There are no thick   walled cavities or intracavitary fluid levels. There is mild   cylindrical bronchiectasis more pronounced in the lower lung   zones. In the dependent right lower lobe there is a small   subpleural region of articulation and groundglass opacity end   there is a smaller region of groundglass opacity in the dorsal   base of the left lower lobe which is nonspecific but maybe early,   small foci of UIP related to the patient's diagnosis of rheumatoid   arthritis. There are multiple small nodules measuring up to 5 mm which are   nonsignificantly changed. No new or enlarging nodules or   consolidation. There is no significant mediastinal adenopathy or   hilar enlargement. Small paratracheal lymph nodes are unchanged   from January 2012   No pleural abnormalities and there is no focal air trapping. There   are mild changes of central lobular emphysema in the upper lung   zones. PFT Jan 2012  FEV1 2.2 L 77% nl ratio TLC 84% DLCO 18.29 88%  PFT 4-22-13  FEV1 2.26 L 70%  TLC 92% DLCO 19.08 69%  PFT Mar 2014  FEV1 2.08 L 65%  TLC 5.63 88% DLCO 20.05 73%  PFT June 2014 FEV1 2.12 L 69%  TLC 6.14 L 99% DLCO 18.31 68%  PFT April 2016 FEV1 2.24 L FVC 3.2 L TLC 6.36 l DLCO 19.16 72%    Pertussis antibodies are positive    CT CHEST 5/20/19  Impression   1.  Multiple new nonspecific ground-glass nodular opacities throughout both   lungs, with more amorphous ground-glass opacities noted within the anterior   right upper lobe. [place] are most likely infectious or inflammatory in   etiology.  Suggest appropriate clinical treatment, and short-term chest CT   follow-up in 6-8 weeks to ensure resolution of these multifocal nodular   opacities. 2. Mild subpleural atelectasis within the bilateral lower lobes, without   acute airspace consolidation. 3. Mild chronic bibasilar pleural and parenchymal scarring. 4. Multiple stable chronic pneumatoceles within the bilateral lung bases. Assessment:      · Chronic bronchitis/cough with cylindrical bronchiectasis, Has done well on Daliresp    · Abnormal CT CHEST  · Lower extremity edema, markedly worse with dropping urine output   · Acute kidney failure - better with diuresis, has not returned completely to her prior baseline  · Rheumatoid arthritis on Embrel and MTX and 4 mg prednisone    · Pulmonary Nodule 8 mm left lower lobe, has decreased in size on serial imaging  · Positive tobacco history, 20 pack year quit in 1991  · Diabetes      Plan:      · PA LAT CXR today  · Fiberoptic bronchoscopy this week, send studies for PCP as well given immunosuppression  · Renal panel same day as fiberoptic bronchoscopy, notify endoscopy  · Metolazone 5 mg only on days with weight greater than 202#   · Continue Daliresp; off Singulair  -- Failed Singulair, Failed Dulera, Failed Spiriva, Failed Advair. · Will eventually need to schedule follow-up CT imaging at follow-up visit  · The risks and benefits of fiberoptic bronchoscopy were specifically discussed, including the goal of obtaining a diagnosis, the risks of bleeding, infection, pneumothorax, lung collapse, hospitalization and death. We also discussed the risks of sedation/anesthesia. It is my assessment that the risk of the invasive procedure is outweighed by the benefit.   Patient was counseled regarding the recommended procedure, alternatives to the procedure, and possible consequences of not having any

## 2019-06-12 ENCOUNTER — HOSPITAL ENCOUNTER (OUTPATIENT)
Age: 69
Setting detail: OUTPATIENT SURGERY
Discharge: HOME | End: 2019-06-12
Attending: INTERNAL MEDICINE | Admitting: INTERNAL MEDICINE
Payer: MEDICARE

## 2019-06-12 VITALS
RESPIRATION RATE: 16 BRPM | HEIGHT: 70 IN | WEIGHT: 201 LBS | HEART RATE: 71 BPM | OXYGEN SATURATION: 96 % | DIASTOLIC BLOOD PRESSURE: 54 MMHG | SYSTOLIC BLOOD PRESSURE: 105 MMHG | TEMPERATURE: 97.6 F | BODY MASS INDEX: 28.77 KG/M2

## 2019-06-12 LAB
ALBUMIN SERPL-MCNC: 3.6 G/DL (ref 3.4–5)
ANION GAP SERPL CALCULATED.3IONS-SCNC: 13 MMOL/L (ref 3–16)
APPEARANCE BAL (LAVAGE): ABNORMAL
BUN BLDV-MCNC: 24 MG/DL (ref 7–20)
CALCIUM SERPL-MCNC: 9.5 MG/DL (ref 8.3–10.6)
CHLORIDE BLD-SCNC: 86 MMOL/L (ref 99–110)
CLOT EVALUATION BAL: ABNORMAL
CO2: 29 MMOL/L (ref 21–32)
COLOR LAVAGE: COLORLESS
CREAT SERPL-MCNC: 1 MG/DL (ref 0.6–1.2)
EOSIN: 2 %
GFR AFRICAN AMERICAN: >60
GFR NON-AFRICAN AMERICAN: 55
GLUCOSE BLD-MCNC: 252 MG/DL (ref 70–99)
GLUCOSE BLD-MCNC: 278 MG/DL (ref 70–99)
LYMPHOCYTES, BAL: 2 % (ref 5–10)
MACROPHAGES, BAL: 4 % (ref 90–95)
NUMBER OF CELLS COUNTED BAL (LAVAGE): 100
PERFORMED ON: ABNORMAL
PHOSPHORUS: 3.1 MG/DL (ref 2.5–4.9)
POTASSIUM SERPL-SCNC: 4.2 MMOL/L (ref 3.5–5.1)
RBC, BAL: <1000 /CUMM
SEGMENTED NEUTROPHILS, BAL: 92 % (ref 5–10)
SODIUM BLD-SCNC: 128 MMOL/L (ref 136–145)
WBC/EPI CELLS BAL: 601 /CUMM

## 2019-06-12 PROCEDURE — 88312 SPECIAL STAINS GROUP 1: CPT

## 2019-06-12 PROCEDURE — 31624 DX BRONCHOSCOPE/LAVAGE: CPT | Performed by: INTERNAL MEDICINE

## 2019-06-12 PROCEDURE — 36415 COLL VENOUS BLD VENIPUNCTURE: CPT

## 2019-06-12 PROCEDURE — 6360000002 HC RX W HCPCS: Performed by: INTERNAL MEDICINE

## 2019-06-12 PROCEDURE — 3609010800 HC BRONCHOSCOPY ALVEOLAR LAVAGE: Performed by: INTERNAL MEDICINE

## 2019-06-12 PROCEDURE — 87205 SMEAR GRAM STAIN: CPT

## 2019-06-12 PROCEDURE — 7100000011 HC PHASE II RECOVERY - ADDTL 15 MIN: Performed by: INTERNAL MEDICINE

## 2019-06-12 PROCEDURE — 2580000003 HC RX 258: Performed by: INTERNAL MEDICINE

## 2019-06-12 PROCEDURE — 87206 SMEAR FLUORESCENT/ACID STAI: CPT

## 2019-06-12 PROCEDURE — 87116 MYCOBACTERIA CULTURE: CPT

## 2019-06-12 PROCEDURE — 87102 FUNGUS ISOLATION CULTURE: CPT

## 2019-06-12 PROCEDURE — G0500 MOD SEDAT ENDO SERVICE >5YRS: HCPCS | Performed by: INTERNAL MEDICINE

## 2019-06-12 PROCEDURE — 87107 FUNGI IDENTIFICATION MOLD: CPT

## 2019-06-12 PROCEDURE — 7100000010 HC PHASE II RECOVERY - FIRST 15 MIN: Performed by: INTERNAL MEDICINE

## 2019-06-12 PROCEDURE — 2709999900 HC NON-CHARGEABLE SUPPLY: Performed by: INTERNAL MEDICINE

## 2019-06-12 PROCEDURE — 99152 MOD SED SAME PHYS/QHP 5/>YRS: CPT | Performed by: INTERNAL MEDICINE

## 2019-06-12 PROCEDURE — 88112 CYTOPATH CELL ENHANCE TECH: CPT

## 2019-06-12 PROCEDURE — 80069 RENAL FUNCTION PANEL: CPT

## 2019-06-12 PROCEDURE — 87106 FUNGI IDENTIFICATION YEAST: CPT

## 2019-06-12 PROCEDURE — 89051 BODY FLUID CELL COUNT: CPT

## 2019-06-12 PROCEDURE — 87070 CULTURE OTHR SPECIMN AEROBIC: CPT

## 2019-06-12 PROCEDURE — 88305 TISSUE EXAM BY PATHOLOGIST: CPT

## 2019-06-12 PROCEDURE — 31645 BRNCHSC W/THER ASPIR 1ST: CPT | Performed by: INTERNAL MEDICINE

## 2019-06-12 PROCEDURE — 87015 SPECIMEN INFECT AGNT CONCNTJ: CPT

## 2019-06-12 RX ORDER — SODIUM CHLORIDE, SODIUM LACTATE, POTASSIUM CHLORIDE, CALCIUM CHLORIDE 600; 310; 30; 20 MG/100ML; MG/100ML; MG/100ML; MG/100ML
INJECTION, SOLUTION INTRAVENOUS CONTINUOUS PRN
Status: COMPLETED | OUTPATIENT
Start: 2019-06-12 | End: 2019-06-12

## 2019-06-12 RX ORDER — MIDAZOLAM HYDROCHLORIDE 5 MG/ML
INJECTION INTRAMUSCULAR; INTRAVENOUS PRN
Status: DISCONTINUED | OUTPATIENT
Start: 2019-06-12 | End: 2019-06-12 | Stop reason: ALTCHOICE

## 2019-06-12 RX ORDER — FENTANYL CITRATE 50 UG/ML
INJECTION, SOLUTION INTRAMUSCULAR; INTRAVENOUS PRN
Status: DISCONTINUED | OUTPATIENT
Start: 2019-06-12 | End: 2019-06-12 | Stop reason: ALTCHOICE

## 2019-06-12 ASSESSMENT — PAIN DESCRIPTION - DESCRIPTORS: DESCRIPTORS: ACHING

## 2019-06-12 ASSESSMENT — PAIN SCALES - GENERAL: PAINLEVEL_OUTOF10: 0

## 2019-06-12 ASSESSMENT — PAIN - FUNCTIONAL ASSESSMENT: PAIN_FUNCTIONAL_ASSESSMENT: 0-10

## 2019-06-12 NOTE — H&P
Fiberoptic bronchoscopy history:     Persistent tracheobronchitis, not responding to therapy. Needs lung cultures, airway evaluation. BAL RUL anterior segment. Patient is allergic to atenolol. Past Medical History:   Diagnosis Date    Arthritis     Back pain     Bronchiectasis ([facility] 10/15/2014    Chronic rheumatic arthritis ([facility]     Diabetes mellitus (HCC)     GERD (gastroesophageal reflux disease)     Hypertension     Pneumonia     Psoriasis     hands and feet    RA (rheumatoid arthritis) ([facility]        Past Surgical History:   Procedure Laterality Date    ARTERY SURGERY  5/30/13    left temporal artery biopsy    BRONCHOSCOPY      CATARACT REMOVAL      ELBOW SURGERY      FOOT SURGERY      HERNIA REPAIR      KNEE ARTHROSCOPY      left    KYPHOSIS SURGERY      LAMINECTOMY      MANDIBLE FRACTURE SURGERY      SEPTOPLASTY  5/7/2013    FESS with balloon    SPINAL FUSION      TUBAL LIGATION      UPPER GASTROINTESTINAL ENDOSCOPY  4/8/2014    Dilitation       Allergies   Allergen Reactions    Atenolol      Cough         No current facility-administered medications on file prior to encounter.       Current Outpatient Medications on File Prior to Encounter   Medication Sig Dispense Refill    aspirin EC 81 MG EC tablet Take 1 tablet by mouth daily      predniSONE (DELTASONE) 10 MG tablet 3 tabs daily for 10 days 30 tablet 0    metolazone (ZAROXOLYN) 5 MG tablet Take 1 tablet by mouth daily 30 tablet 0    ipratropium (ATROVENT) 0.06 % nasal spray 2 SPRAYS BY NASAL ROUTE 2-4 TIMES DAILY 1 Bottle 5    atorvastatin (LIPITOR) 40 MG tablet Take 40 mg by mouth      furosemide (LASIX) 40 MG tablet Take 40 mg by mouth 2 times daily      cyclobenzaprine (FLEXERIL) 10 MG tablet Take 10 mg by mouth      Insulin Syringe-Needle U-100 31G X 5/16\" 0.5 ML MISC USE 5 TIMES DAILY      meloxicam (MOBIC) 15 MG tablet Historical Medication      ACCU-CHEK CEDRICK PLUS strip TEST 4 TIMES DAILY  3    HYDROcodone-acetaminophen (NORCO) 7.5-325 MG per tablet TAKE 1 TABLET BY MOUTH 3 TIMES A DAY AS NEEDED  0    Roflumilast (DALIRESP) 500 MCG tablet Take 500 mcg by mouth daily 90 tablet 3    albuterol sulfate  (90 Base) MCG/ACT inhaler Inhale 2 puffs into the lungs every 4 hours as needed for Wheezing 1 Inhaler 3    metoprolol succinate (TOPROL XL) 25 MG extended release tablet Take 25 mg by mouth daily      rosuvastatin (CRESTOR) 10 MG tablet Take 10 mg by mouth daily      ipratropium-albuterol (DUONEB) 0.5-2.5 (3) MG/3ML SOLN nebulizer solution INHALE 3 MLS INTO LUNGS EVERY 6 HOURS AS NEEDED FOR SHORTNESS OF BREATH 360 mL 5    predniSONE (DELTASONE) 1 MG tablet Take 4 mg by mouth daily      insulin glargine (LANTUS) 100 UNIT/ML injection vial Inject 25 Units into the skin nightly      gabapentin (NEURONTIN) 300 MG capsule Take 1 capsule by mouth nightly      insulin lispro (HUMALOG) 100 UNIT/ML injection vial Inject 0-12 Units into the skin 3 times daily (with meals)      spironolactone (ALDACTONE) 50 MG tablet Take 1 tablet by mouth daily 30 tablet 3    Misc. Devices (ACAPELLA) MISC Take 1 Device by mouth as needed 1 each 0    fluticasone (FLONASE) 50 MCG/ACT nasal spray INHALE 2 SPRAYS IN EACH NOSTRIL DAILY 1 Bottle 5    cetirizine (ZYRTEC) 10 MG tablet Take 10 mg by mouth daily.  etanercept (ENBREL) 50 MG/ML injection Inject 50 mg into the skin every 7 days.  omeprazole (PRILOSEC) 40 MG capsule Take 40 mg by mouth daily       folic acid (FOLVITE) 1 MG tablet Take 1 mg by mouth daily.  methotrexate 2.5 MG tablet Take 2.5 mg by mouth 3 Every other week            Medication list was reviewed and updated as needed in Epic     reports that she quit smoking about 28 years ago. Her smoking use included cigarettes. She has a 20.00 pack-year smoking history.  She has never used smokeless tobacco.    family history includes Asthma in an other family member; Diabetes in her brother, mother, and sister; Heart Disease in her brother and brother; Hypertension in her mother. Blood pressure (!) 156/58, pulse 80, temperature 97.2 °F (36.2 °C), temperature source Temporal, resp. rate 16, height 5' 10\" (1.778 m), weight 201 lb (91.2 kg), SpO2 97 %. HENT: Airway patent and reviewed. Mallampati 2  Cardiovascular: Normal rate, regular rhythm, normal heart sounds. Pulmonary/Chest: No wheezes. No rhonchi. No rales. Abdominal: Soft. Bowel sounds are normal. No distension. ASA CLASS       III. Severe Systemic Disease      Sedation plan: Moderate      Post Procedure Plan   Return to same level of care   ______________________     The risks and benefits of fiberoptic bronchoscopy were specifically discussed, including the goal of obtaining a diagnosis, the risks of bleeding, infection, pneumothorax, lung collapse, hospitalization and death. We also discussed the risks of sedation/anesthesia. It is my assessment that the risk of the invasive procedure is outweighed by the benefit. Patient was counseled regarding the recommended procedure, alternatives to the procedure, and possible consequences of not having any evaluation performed.

## 2019-06-12 NOTE — PROCEDURES
PROCEDURE:  BRONCHOSCOPY WITH BRONCHOALVEOLAR LAVAGE    Moderate sedation with fentanyl and versed was used. Total moderate sedation time in minutes was 12 (CPT 25643). Patient was monitored continuously 1:1 throughout the entire procedure while sedation was administered & until she had sufficiently recovered from the sedatives. The scope was passed with ease via the mouth. A complete airway inspection was performed. Tracheobronchomalacia was noted. There was significant collapse of the trachea with respiration, probably 25%, with more severe 75% collapse during cough. The right mainstem was the most affected site, with 50-75% occlusion with exhalation and 75-95% occlusion with cough. No endobronchial lesions were identified. There was a small amount of very thick viscous secretions, mostly concentrated in the left mainstem and left lower lobe airways. Therapeutic aspiration of mucus plugging in the left lower lobe was performed. Washings were obtained throughout the airways. A Bronchoalveolar lavage was obtained from the right upper lobe anterior segment with good return. Estimated blood loss was less than 5 ml. The patient tolerated the procedure well. Recovery will be per endoscopy protocol. FOLLOW UP:  Cultures and cytology in  three to five days.    Will need PSG to see if we can qualify for nighttime CPAP

## 2019-06-14 LAB
CULTURE, RESPIRATORY: NORMAL
CULTURE, RESPIRATORY: NORMAL
GRAM STAIN RESULT: NORMAL
GRAM STAIN RESULT: NORMAL

## 2019-06-14 RX ORDER — AZITHROMYCIN 250 MG/1
250 TABLET, FILM COATED ORAL DAILY
Qty: 30 TABLET | Refills: 1 | Status: SHIPPED | OUTPATIENT
Start: 2019-06-14 | End: 2019-06-19 | Stop reason: SDUPTHER

## 2019-06-14 NOTE — RESULT ENCOUNTER NOTE
Tell pt there are no resistant organisms on her fiberoptic bronchoscopy but I would like her to start a daily antibiotic azithromycin for next 30 days. See me as scheduled.

## 2019-06-19 ENCOUNTER — OFFICE VISIT (OUTPATIENT)
Dept: PULMONOLOGY | Age: 69
End: 2019-06-19
Payer: MEDICARE

## 2019-06-19 ENCOUNTER — HOSPITAL ENCOUNTER (OUTPATIENT)
Age: 69
Discharge: HOME | End: 2019-06-19
Payer: MEDICARE

## 2019-06-19 VITALS
OXYGEN SATURATION: 98 % | WEIGHT: 197.4 LBS | SYSTOLIC BLOOD PRESSURE: 122 MMHG | BODY MASS INDEX: 27.64 KG/M2 | RESPIRATION RATE: 20 BRPM | HEART RATE: 77 BPM | HEIGHT: 71 IN | DIASTOLIC BLOOD PRESSURE: 78 MMHG

## 2019-06-19 DIAGNOSIS — J44.9 CHRONIC OBSTRUCTIVE PULMONARY DISEASE, UNSPECIFIED COPD TYPE (HCC): ICD-10-CM

## 2019-06-19 DIAGNOSIS — G47.30 SLEEP APNEA, UNSPECIFIED TYPE: Primary | ICD-10-CM

## 2019-06-19 DIAGNOSIS — J39.8 TRACHEOBRONCHOMALACIA: ICD-10-CM

## 2019-06-19 LAB
EKG ATRIAL RATE: 73 BPM
EKG DIAGNOSIS: NORMAL
EKG P AXIS: 71 DEGREES
EKG P-R INTERVAL: 152 MS
EKG Q-T INTERVAL: 398 MS
EKG QRS DURATION: 90 MS
EKG QTC CALCULATION (BAZETT): 438 MS
EKG R AXIS: 4 DEGREES
EKG T AXIS: 64 DEGREES
EKG VENTRICULAR RATE: 73 BPM

## 2019-06-19 PROCEDURE — 93005 ELECTROCARDIOGRAM TRACING: CPT | Performed by: INTERNAL MEDICINE

## 2019-06-19 PROCEDURE — 93010 ELECTROCARDIOGRAM REPORT: CPT | Performed by: INTERNAL MEDICINE

## 2019-06-19 PROCEDURE — 99214 OFFICE O/P EST MOD 30 MIN: CPT | Performed by: INTERNAL MEDICINE

## 2019-06-19 RX ORDER — AZITHROMYCIN 250 MG/1
250 TABLET, FILM COATED ORAL DAILY
Qty: 30 TABLET | Refills: 2 | Status: SHIPPED | OUTPATIENT
Start: 2019-06-19 | End: 2019-07-06

## 2019-06-19 ASSESSMENT — SLEEP AND FATIGUE QUESTIONNAIRES
HOW LIKELY ARE YOU TO NOD OFF OR FALL ASLEEP WHILE LYING DOWN TO REST IN THE AFTERNOON WHEN CIRCUMSTANCES PERMIT: 0
HOW LIKELY ARE YOU TO NOD OFF OR FALL ASLEEP IN A CAR, WHILE STOPPED FOR A FEW MINUTES IN TRAFFIC: 0
HOW LIKELY ARE YOU TO NOD OFF OR FALL ASLEEP WHILE SITTING QUIETLY AFTER LUNCH WITHOUT ALCOHOL: 0
HOW LIKELY ARE YOU TO NOD OFF OR FALL ASLEEP WHEN YOU ARE A PASSENGER IN A CAR FOR AN HOUR WITHOUT A BREAK: 0
HOW LIKELY ARE YOU TO NOD OFF OR FALL ASLEEP WHILE SITTING AND TALKING TO SOMEONE: 0
HOW LIKELY ARE YOU TO NOD OFF OR FALL ASLEEP WHILE SITTING AND READING: 2
NECK CIRCUMFERENCE (INCHES): 14.25
ESS TOTAL SCORE: 2
HOW LIKELY ARE YOU TO NOD OFF OR FALL ASLEEP WHILE WATCHING TV: 0
HOW LIKELY ARE YOU TO NOD OFF OR FALL ASLEEP WHILE SITTING INACTIVE IN A PUBLIC PLACE: 0

## 2019-06-19 NOTE — PROGRESS NOTES
CC: cough  HPI    Interval History #3: Had bronchoscopy that showed tracheobronchomalacia, most severe right mainstem, also had mucous plugging of the left lower lobe. She is feeling better since bronchoscopy. Still with cough but it is improved. Interval History #2:  With loss of 10 pounds of fluid with metolazone given at last visit, her kidney function actually improved. However, her cough and shortness of breath are same. Severe cough, congested. She continues on embrel and MTX. Interval History #1:Treated in March with prednisone and Zpac, then received prednisone/doxy early May 2019 and at first was getting better, but beginning Shane May 10 developed fatigue, malaise, worsening cough, chest congestion. She was treated by me for acute pneumonia with Levaquin and prednisone with improvement. Subsequently 4 days ago she noted that despite taking Lasix 80 BID + spironolactone, has stopped making much urine with this and has 4 day h/o markedly increased lower extremity edema. She has associated shortness of breath and cough. Cough is described as severe. PREVIOUS HX  61 yo with 2 month h/o severe waxing and waning cough, treated with avelox without improvement and then changed to Z pac, some improvement; then treated with prednisone and Z pac and then clearly better, then returned and retreated with cough medicine and prednisone. No cough prior to 3 months ago except with sinus drainage. Cough has slowly been improving since last visit here. ESS is 2      Objective:   Physical Exam  Blood pressure 122/78, pulse 77, resp. rate 20, height 5' 10.5\" (1.791 m), weight 197 lb 6.4 oz (89.5 kg), SpO2 98 %. 'on RA  Constitutional:  No acute distress. HENT:  Oropharynx is clear and moist.  Class IV  Eyes: Conjunctivae are normal. Pupils equal, round, and reactive to light. No scleral icterus. Neck: No tracheal deviation present. No obvious thyromegaly.     14.25 inches  Cardiovascular: Normal 72%    Pertussis antibodies are positive    CT CHEST 5/20/19  Impression   1. Multiple new nonspecific ground-glass nodular opacities throughout both   lungs, with more amorphous ground-glass opacities noted within the anterior   right upper lobe.  These are most likely infectious or inflammatory in   etiology.  Suggest appropriate clinical treatment, and short-term chest CT   follow-up in 6-8 weeks to ensure resolution of these multifocal nodular   opacities. 2. Mild subpleural atelectasis within the bilateral lower lobes, without   acute airspace consolidation. 3. Mild chronic bibasilar pleural and parenchymal scarring. 4. Multiple stable chronic pneumatoceles within the bilateral lung bases. Assessment:      · COPD/Chronic bronchitis/cough with cylindrical bronchiectasis, Has done well on Daliresp   · Snoring, wakes from sleep due to apnea  · Witnessed sleep apnea  · Tracheomalacia    · Abnormal CT CHEST  · Lower extremity edema, markedly better after 3 days metolazone. · Acute kidney failure - better with diuresis   · Rheumatoid arthritis on Embrel and MTX and 4 mg prednisone    · Pulmonary Nodule 8 mm left lower lobe, has decreased in size on serial imaging  · Positive tobacco history, 20 pack year quit in 1991  · Diabetes      Plan:      · Azithromycin daily  · PSG dx witnessed sleep apnea  · Metolazone 5 mg only on days with weight greater than 200#  · Continue Daliresp; off Singulair  -- Failed Singulair, Failed Dulera, Failed Spiriva, Failed Advair.     · Plan follow-up CT imaging -schedule at next visit        [facility]    ·

## 2019-06-26 RX ORDER — METOLAZONE 5 MG/1
TABLET ORAL
Qty: 30 TABLET | Refills: 0 | Status: ON HOLD | OUTPATIENT
Start: 2019-06-26 | End: 2019-07-09 | Stop reason: HOSPADM

## 2019-07-06 ENCOUNTER — HOSPITAL ENCOUNTER (INPATIENT)
Age: 69
LOS: 3 days | Discharge: HOME | DRG: 683 | End: 2019-07-09
Attending: EMERGENCY MEDICINE | Admitting: INTERNAL MEDICINE
Payer: MEDICARE

## 2019-07-06 DIAGNOSIS — N17.9 AKI (ACUTE KIDNEY INJURY) (HCC): Primary | ICD-10-CM

## 2019-07-06 DIAGNOSIS — E87.6 HYPOKALEMIA: ICD-10-CM

## 2019-07-06 LAB
A/G RATIO: 1 (ref 1.1–2.2)
ALBUMIN SERPL-MCNC: 3.8 G/DL (ref 3.4–5)
ALP BLD-CCNC: 98 U/L (ref 40–129)
ALT SERPL-CCNC: 12 U/L (ref 10–40)
ANION GAP SERPL CALCULATED.3IONS-SCNC: 16 MMOL/L (ref 3–16)
AST SERPL-CCNC: 18 U/L (ref 15–37)
BACTERIA: ABNORMAL /HPF
BASOPHILS ABSOLUTE: 0 K/UL (ref 0–0.2)
BASOPHILS RELATIVE PERCENT: 0.7 %
BILIRUB SERPL-MCNC: 0.8 MG/DL (ref 0–1)
BILIRUBIN URINE: NEGATIVE
BLOOD, URINE: ABNORMAL
BUN BLDV-MCNC: 37 MG/DL (ref 7–20)
CALCIUM SERPL-MCNC: 9.3 MG/DL (ref 8.3–10.6)
CASTS 2: ABNORMAL /LPF
CHLORIDE BLD-SCNC: 73 MMOL/L (ref 99–110)
CLARITY: CLEAR
CO2: 32 MMOL/L (ref 21–32)
COLOR: YELLOW
CREAT SERPL-MCNC: 1.5 MG/DL (ref 0.6–1.2)
EOSINOPHILS ABSOLUTE: 0.1 K/UL (ref 0–0.6)
EOSINOPHILS RELATIVE PERCENT: 1.4 %
EPITHELIAL CELLS, UA: ABNORMAL /HPF
GFR AFRICAN AMERICAN: 42
GFR NON-AFRICAN AMERICAN: 35
GLOBULIN: 3.8 G/DL
GLUCOSE BLD-MCNC: 133 MG/DL (ref 70–99)
GLUCOSE BLD-MCNC: 173 MG/DL (ref 70–99)
GLUCOSE URINE: 100 MG/DL
HCT VFR BLD CALC: 31.6 % (ref 36–48)
HEMOGLOBIN: 10.9 G/DL (ref 12–16)
KETONES, URINE: NEGATIVE MG/DL
LEUKOCYTE ESTERASE, URINE: NEGATIVE
LYMPHOCYTES ABSOLUTE: 0.9 K/UL (ref 1–5.1)
LYMPHOCYTES RELATIVE PERCENT: 14.9 %
MAGNESIUM: 2.2 MG/DL (ref 1.8–2.4)
MCH RBC QN AUTO: 30.8 PG (ref 26–34)
MCHC RBC AUTO-ENTMCNC: 34.4 G/DL (ref 31–36)
MCV RBC AUTO: 89.3 FL (ref 80–100)
MICROSCOPIC EXAMINATION: YES
MONOCYTES ABSOLUTE: 0.4 K/UL (ref 0–1.3)
MONOCYTES RELATIVE PERCENT: 6.1 %
MUCUS: ABNORMAL /LPF
NEUTROPHILS ABSOLUTE: 4.6 K/UL (ref 1.7–7.7)
NEUTROPHILS RELATIVE PERCENT: 76.9 %
NITRITE, URINE: NEGATIVE
PDW BLD-RTO: 13.5 % (ref 12.4–15.4)
PERFORMED ON: ABNORMAL
PH UA: 6.5 (ref 5–8)
PLATELET # BLD: 220 K/UL (ref 135–450)
PMV BLD AUTO: 7.6 FL (ref 5–10.5)
POTASSIUM SERPL-SCNC: 2.9 MMOL/L (ref 3.5–5.1)
PROTEIN UA: NEGATIVE MG/DL
RBC # BLD: 3.54 M/UL (ref 4–5.2)
RBC UA: ABNORMAL /HPF (ref 0–2)
SODIUM BLD-SCNC: 121 MMOL/L (ref 136–145)
SPECIFIC GRAVITY UA: <=1.005 (ref 1–1.03)
TOTAL PROTEIN: 7.6 G/DL (ref 6.4–8.2)
TROPONIN: <0.01 NG/ML
URINE REFLEX TO CULTURE: ABNORMAL
URINE TYPE: ABNORMAL
UROBILINOGEN, URINE: 0.2 E.U./DL
WBC # BLD: 6 K/UL (ref 4–11)
WBC UA: ABNORMAL /HPF (ref 0–5)

## 2019-07-06 PROCEDURE — 83735 ASSAY OF MAGNESIUM: CPT

## 2019-07-06 PROCEDURE — 96366 THER/PROPH/DIAG IV INF ADDON: CPT

## 2019-07-06 PROCEDURE — 84484 ASSAY OF TROPONIN QUANT: CPT

## 2019-07-06 PROCEDURE — 2580000003 HC RX 258: Performed by: PHYSICIAN ASSISTANT

## 2019-07-06 PROCEDURE — 99285 EMERGENCY DEPT VISIT HI MDM: CPT

## 2019-07-06 PROCEDURE — 6360000002 HC RX W HCPCS: Performed by: PHYSICIAN ASSISTANT

## 2019-07-06 PROCEDURE — 81001 URINALYSIS AUTO W/SCOPE: CPT

## 2019-07-06 PROCEDURE — 1200000000 HC SEMI PRIVATE

## 2019-07-06 PROCEDURE — 6370000000 HC RX 637 (ALT 250 FOR IP): Performed by: HOSPITALIST

## 2019-07-06 PROCEDURE — 80053 COMPREHEN METABOLIC PANEL: CPT

## 2019-07-06 PROCEDURE — 85025 COMPLETE CBC W/AUTO DIFF WBC: CPT

## 2019-07-06 PROCEDURE — 83036 HEMOGLOBIN GLYCOSYLATED A1C: CPT

## 2019-07-06 PROCEDURE — 6370000000 HC RX 637 (ALT 250 FOR IP): Performed by: PHYSICIAN ASSISTANT

## 2019-07-06 PROCEDURE — 93005 ELECTROCARDIOGRAM TRACING: CPT | Performed by: EMERGENCY MEDICINE

## 2019-07-06 PROCEDURE — 96365 THER/PROPH/DIAG IV INF INIT: CPT

## 2019-07-06 RX ORDER — PREDNISONE 1 MG/1
4 TABLET ORAL DAILY
Status: DISCONTINUED | OUTPATIENT
Start: 2019-07-07 | End: 2019-07-09 | Stop reason: HOSPADM

## 2019-07-06 RX ORDER — CETIRIZINE HYDROCHLORIDE 10 MG/1
10 TABLET ORAL DAILY
Status: DISCONTINUED | OUTPATIENT
Start: 2019-07-07 | End: 2019-07-09 | Stop reason: HOSPADM

## 2019-07-06 RX ORDER — INSULIN GLARGINE 100 [IU]/ML
25 INJECTION, SOLUTION SUBCUTANEOUS NIGHTLY
Status: DISCONTINUED | OUTPATIENT
Start: 2019-07-07 | End: 2019-07-09

## 2019-07-06 RX ORDER — SODIUM CHLORIDE 9 MG/ML
INJECTION, SOLUTION INTRAVENOUS CONTINUOUS
Status: DISCONTINUED | OUTPATIENT
Start: 2019-07-07 | End: 2019-07-07

## 2019-07-06 RX ORDER — IPRATROPIUM BROMIDE 42 UG/1
2 SPRAY, METERED NASAL 2 TIMES DAILY
Status: DISCONTINUED | OUTPATIENT
Start: 2019-07-07 | End: 2019-07-09 | Stop reason: HOSPADM

## 2019-07-06 RX ORDER — SODIUM CHLORIDE 0.9 % (FLUSH) 0.9 %
10 SYRINGE (ML) INJECTION PRN
Status: DISCONTINUED | OUTPATIENT
Start: 2019-07-06 | End: 2019-07-09 | Stop reason: HOSPADM

## 2019-07-06 RX ORDER — POTASSIUM CHLORIDE 7.45 MG/ML
10 INJECTION INTRAVENOUS ONCE
Status: COMPLETED | OUTPATIENT
Start: 2019-07-06 | End: 2019-07-06

## 2019-07-06 RX ORDER — HYDROCODONE BITARTRATE AND ACETAMINOPHEN 7.5; 325 MG/1; MG/1
1 TABLET ORAL EVERY 6 HOURS PRN
Status: DISCONTINUED | OUTPATIENT
Start: 2019-07-06 | End: 2019-07-09 | Stop reason: HOSPADM

## 2019-07-06 RX ORDER — ROSUVASTATIN CALCIUM 10 MG/1
10 TABLET, COATED ORAL DAILY
Status: DISCONTINUED | OUTPATIENT
Start: 2019-07-07 | End: 2019-07-07

## 2019-07-06 RX ORDER — NICOTINE POLACRILEX 4 MG
15 LOZENGE BUCCAL PRN
Status: DISCONTINUED | OUTPATIENT
Start: 2019-07-06 | End: 2019-07-09 | Stop reason: HOSPADM

## 2019-07-06 RX ORDER — SODIUM CHLORIDE 0.9 % (FLUSH) 0.9 %
10 SYRINGE (ML) INJECTION EVERY 12 HOURS SCHEDULED
Status: DISCONTINUED | OUTPATIENT
Start: 2019-07-07 | End: 2019-07-09 | Stop reason: HOSPADM

## 2019-07-06 RX ORDER — POTASSIUM CHLORIDE 20 MEQ/1
40 TABLET, EXTENDED RELEASE ORAL ONCE
Status: COMPLETED | OUTPATIENT
Start: 2019-07-06 | End: 2019-07-06

## 2019-07-06 RX ORDER — ACETAMINOPHEN 325 MG/1
650 TABLET ORAL EVERY 4 HOURS PRN
Status: DISCONTINUED | OUTPATIENT
Start: 2019-07-06 | End: 2019-07-09 | Stop reason: HOSPADM

## 2019-07-06 RX ORDER — CYCLOBENZAPRINE HCL 10 MG
10 TABLET ORAL 3 TIMES DAILY PRN
Status: DISCONTINUED | OUTPATIENT
Start: 2019-07-06 | End: 2019-07-09 | Stop reason: HOSPADM

## 2019-07-06 RX ORDER — DEXTROSE MONOHYDRATE 50 MG/ML
100 INJECTION, SOLUTION INTRAVENOUS PRN
Status: DISCONTINUED | OUTPATIENT
Start: 2019-07-06 | End: 2019-07-09 | Stop reason: HOSPADM

## 2019-07-06 RX ORDER — 0.9 % SODIUM CHLORIDE 0.9 %
1000 INTRAVENOUS SOLUTION INTRAVENOUS ONCE
Status: COMPLETED | OUTPATIENT
Start: 2019-07-06 | End: 2019-07-06

## 2019-07-06 RX ORDER — FLUTICASONE PROPIONATE 50 MCG
2 SPRAY, SUSPENSION (ML) NASAL DAILY
Status: DISCONTINUED | OUTPATIENT
Start: 2019-07-07 | End: 2019-07-09 | Stop reason: HOSPADM

## 2019-07-06 RX ORDER — ALBUTEROL SULFATE 90 UG/1
2 AEROSOL, METERED RESPIRATORY (INHALATION) EVERY 4 HOURS PRN
Status: DISCONTINUED | OUTPATIENT
Start: 2019-07-06 | End: 2019-07-09 | Stop reason: HOSPADM

## 2019-07-06 RX ORDER — FOLIC ACID 1 MG/1
1 TABLET ORAL DAILY
Status: DISCONTINUED | OUTPATIENT
Start: 2019-07-07 | End: 2019-07-09 | Stop reason: HOSPADM

## 2019-07-06 RX ORDER — GABAPENTIN 300 MG/1
300 CAPSULE ORAL NIGHTLY
Status: DISCONTINUED | OUTPATIENT
Start: 2019-07-07 | End: 2019-07-09 | Stop reason: HOSPADM

## 2019-07-06 RX ORDER — ASPIRIN 81 MG/1
81 TABLET ORAL DAILY
Status: DISCONTINUED | OUTPATIENT
Start: 2019-07-07 | End: 2019-07-09 | Stop reason: HOSPADM

## 2019-07-06 RX ORDER — ATORVASTATIN CALCIUM 40 MG/1
40 TABLET, FILM COATED ORAL NIGHTLY
Status: DISCONTINUED | OUTPATIENT
Start: 2019-07-07 | End: 2019-07-09 | Stop reason: HOSPADM

## 2019-07-06 RX ORDER — PANTOPRAZOLE SODIUM 40 MG/1
40 TABLET, DELAYED RELEASE ORAL
Status: DISCONTINUED | OUTPATIENT
Start: 2019-07-07 | End: 2019-07-09 | Stop reason: HOSPADM

## 2019-07-06 RX ORDER — ONDANSETRON 2 MG/ML
4 INJECTION INTRAMUSCULAR; INTRAVENOUS EVERY 6 HOURS PRN
Status: DISCONTINUED | OUTPATIENT
Start: 2019-07-06 | End: 2019-07-09 | Stop reason: HOSPADM

## 2019-07-06 RX ORDER — DEXTROSE MONOHYDRATE 25 G/50ML
12.5 INJECTION, SOLUTION INTRAVENOUS PRN
Status: DISCONTINUED | OUTPATIENT
Start: 2019-07-06 | End: 2019-07-09 | Stop reason: HOSPADM

## 2019-07-06 RX ORDER — METOPROLOL SUCCINATE 25 MG/1
25 TABLET, EXTENDED RELEASE ORAL DAILY
Status: DISCONTINUED | OUTPATIENT
Start: 2019-07-07 | End: 2019-07-09 | Stop reason: HOSPADM

## 2019-07-06 RX ADMIN — POTASSIUM CHLORIDE 10 MEQ: 10 INJECTION, SOLUTION INTRAVENOUS at 20:32

## 2019-07-06 RX ADMIN — POTASSIUM CHLORIDE 40 MEQ: 20 TABLET, EXTENDED RELEASE ORAL at 20:20

## 2019-07-06 RX ADMIN — SODIUM CHLORIDE 1000 ML: 9 INJECTION, SOLUTION INTRAVENOUS at 20:20

## 2019-07-06 ASSESSMENT — PAIN DESCRIPTION - PAIN TYPE: TYPE: CHRONIC PAIN

## 2019-07-06 NOTE — ED PROVIDER NOTES
None   Lifestyle    Physical activity:     Days per week: None     Minutes per session: None    Stress: None   Relationships    Social connections:     Talks on phone: None     Gets together: None     Attends [employer] service: None     Active member of club or organization: None     Attends meetings of clubs or organizations: None     Relationship status: None    Intimate partner violence:     Fear of current or ex partner: None     Emotionally abused: None     Physically abused: None     Forced sexual activity: None   Other Topics Concern    None   Social History Narrative    None       SCREENINGS    Patrice Coma Scale  Eye Opening: Spontaneous  Best Verbal Response: Oriented  Best Motor Response: Obeys commands  Patrice Coma Scale Score: 15        PHYSICAL EXAM    (up to 7 for level 4, 8 or more for level 5)     ED Triage Vitals [07/06/19 1841]   BP Temp Temp Source Pulse Resp SpO2 Height Weight   123/73 97.8 °F (36.6 °C) Oral 65 16 94 % 5' 10.5\" (1.791 m) 198 lb (89.8 kg)       Physical Exam  PHYSICAL EXAM  BP (!) 163/82   Pulse 68   Temp 97.8 °F (36.6 °C) (Oral)   Resp 16   Ht 5' 10.5\" (1.791 m)   Wt 198 lb (89.8 kg)   SpO2 98%   BMI 28.01 kg/m²   GENERAL APPEARANCE: Awake and alert. Cooperative. Appropriately appearing adult elderly female, lying comfortably in exam room, in no acute distress, conversational, moving all extremities without difficulty or assistance. HEAD: Normocephalic. Atraumatic. EYES: PERRL. EOM's grossly intact. No injection or scleral icterus  ENT: Mucous membranes are moist.  Posterior pharynx is patent, nonerythematous. NECK: Supple. No anterior cervical lymphadenopathy. No JVD. HEART: Normal rate, irregular rhythm. . No murmurs. LUNGS: Respirations unlabored however with diffuse inspiratory wheeze. CTAB. Good air exchange. Speaking comfortably in full sentences. ABDOMEN: Soft. Non-distended. Non-tender. No masses. No organomegaly. No guarding or rebound. 10 days              (Please note that portions ofthis note were completed with a voice recognition program.  Efforts were made to edit the dictations but occasionally words are mis-transcribed.)    Via Kyler Wynn  [facility] (electronically signed)       Via Kyler Wynn  [place]  07/06/19 1952

## 2019-07-07 LAB
ANION GAP SERPL CALCULATED.3IONS-SCNC: 11 MMOL/L (ref 3–16)
ANION GAP SERPL CALCULATED.3IONS-SCNC: 12 MMOL/L (ref 3–16)
ANION GAP SERPL CALCULATED.3IONS-SCNC: 16 MMOL/L (ref 3–16)
BASOPHILS ABSOLUTE: 0 K/UL (ref 0–0.2)
BASOPHILS RELATIVE PERCENT: 0.5 %
BUN BLDV-MCNC: 25 MG/DL (ref 7–20)
BUN BLDV-MCNC: 25 MG/DL (ref 7–20)
BUN BLDV-MCNC: 28 MG/DL (ref 7–20)
CALCIUM SERPL-MCNC: 8.3 MG/DL (ref 8.3–10.6)
CALCIUM SERPL-MCNC: 8.8 MG/DL (ref 8.3–10.6)
CALCIUM SERPL-MCNC: 8.8 MG/DL (ref 8.3–10.6)
CHLORIDE BLD-SCNC: 76 MMOL/L (ref 99–110)
CHLORIDE BLD-SCNC: 77 MMOL/L (ref 99–110)
CHLORIDE BLD-SCNC: 81 MMOL/L (ref 99–110)
CO2: 28 MMOL/L (ref 21–32)
CO2: 29 MMOL/L (ref 21–32)
CO2: 30 MMOL/L (ref 21–32)
CREAT SERPL-MCNC: 1 MG/DL (ref 0.6–1.2)
CREAT SERPL-MCNC: 1 MG/DL (ref 0.6–1.2)
CREAT SERPL-MCNC: 1.1 MG/DL (ref 0.6–1.2)
EKG ATRIAL RATE: 62 BPM
EKG DIAGNOSIS: NORMAL
EKG P AXIS: 66 DEGREES
EKG P-R INTERVAL: 176 MS
EKG Q-T INTERVAL: 488 MS
EKG QRS DURATION: 106 MS
EKG QTC CALCULATION (BAZETT): 495 MS
EKG R AXIS: -10 DEGREES
EKG T AXIS: 67 DEGREES
EKG VENTRICULAR RATE: 62 BPM
EOSINOPHILS ABSOLUTE: 0.3 K/UL (ref 0–0.6)
EOSINOPHILS RELATIVE PERCENT: 4.4 %
ESTIMATED AVERAGE GLUCOSE: 200.1 MG/DL
GFR AFRICAN AMERICAN: 60
GFR AFRICAN AMERICAN: >60
GFR AFRICAN AMERICAN: >60
GFR NON-AFRICAN AMERICAN: 49
GFR NON-AFRICAN AMERICAN: 55
GFR NON-AFRICAN AMERICAN: 55
GLUCOSE BLD-MCNC: 184 MG/DL (ref 70–99)
GLUCOSE BLD-MCNC: 238 MG/DL (ref 70–99)
GLUCOSE BLD-MCNC: 244 MG/DL (ref 70–99)
GLUCOSE BLD-MCNC: 260 MG/DL (ref 70–99)
GLUCOSE BLD-MCNC: 354 MG/DL (ref 70–99)
GLUCOSE BLD-MCNC: 367 MG/DL (ref 70–99)
GLUCOSE BLD-MCNC: 392 MG/DL (ref 70–99)
GLUCOSE BLD-MCNC: 395 MG/DL (ref 70–99)
HBA1C MFR BLD: 8.6 %
HCT VFR BLD CALC: 34.5 % (ref 36–48)
HEMOGLOBIN: 11.8 G/DL (ref 12–16)
LYMPHOCYTES ABSOLUTE: 1.2 K/UL (ref 1–5.1)
LYMPHOCYTES RELATIVE PERCENT: 20.6 %
MAGNESIUM: 2.1 MG/DL (ref 1.8–2.4)
MCH RBC QN AUTO: 30.5 PG (ref 26–34)
MCHC RBC AUTO-ENTMCNC: 34.2 G/DL (ref 31–36)
MCV RBC AUTO: 89 FL (ref 80–100)
MONOCYTES ABSOLUTE: 0.4 K/UL (ref 0–1.3)
MONOCYTES RELATIVE PERCENT: 6.5 %
NEUTROPHILS ABSOLUTE: 3.9 K/UL (ref 1.7–7.7)
NEUTROPHILS RELATIVE PERCENT: 68 %
PDW BLD-RTO: 13.8 % (ref 12.4–15.4)
PERFORMED ON: ABNORMAL
PLATELET # BLD: 222 K/UL (ref 135–450)
PMV BLD AUTO: 7.9 FL (ref 5–10.5)
POTASSIUM REFLEX MAGNESIUM: 2.9 MMOL/L (ref 3.5–5.1)
POTASSIUM SERPL-SCNC: 3.4 MMOL/L (ref 3.5–5.1)
POTASSIUM SERPL-SCNC: 3.9 MMOL/L (ref 3.5–5.1)
RBC # BLD: 3.87 M/UL (ref 4–5.2)
SODIUM BLD-SCNC: 116 MMOL/L (ref 136–145)
SODIUM BLD-SCNC: 121 MMOL/L (ref 136–145)
SODIUM BLD-SCNC: 123 MMOL/L (ref 136–145)
WBC # BLD: 5.8 K/UL (ref 4–11)

## 2019-07-07 PROCEDURE — 1200000000 HC SEMI PRIVATE

## 2019-07-07 PROCEDURE — 6360000002 HC RX W HCPCS: Performed by: HOSPITALIST

## 2019-07-07 PROCEDURE — 93010 ELECTROCARDIOGRAM REPORT: CPT | Performed by: INTERNAL MEDICINE

## 2019-07-07 PROCEDURE — 96372 THER/PROPH/DIAG INJ SC/IM: CPT

## 2019-07-07 PROCEDURE — 96367 TX/PROPH/DG ADDL SEQ IV INF: CPT

## 2019-07-07 PROCEDURE — 80048 BASIC METABOLIC PNL TOTAL CA: CPT

## 2019-07-07 PROCEDURE — 96366 THER/PROPH/DIAG IV INF ADDON: CPT

## 2019-07-07 PROCEDURE — 2580000003 HC RX 258: Performed by: INTERNAL MEDICINE

## 2019-07-07 PROCEDURE — 83735 ASSAY OF MAGNESIUM: CPT

## 2019-07-07 PROCEDURE — 6370000000 HC RX 637 (ALT 250 FOR IP): Performed by: INTERNAL MEDICINE

## 2019-07-07 PROCEDURE — 6370000000 HC RX 637 (ALT 250 FOR IP): Performed by: HOSPITALIST

## 2019-07-07 PROCEDURE — 36415 COLL VENOUS BLD VENIPUNCTURE: CPT

## 2019-07-07 PROCEDURE — 2580000003 HC RX 258: Performed by: HOSPITALIST

## 2019-07-07 PROCEDURE — 85025 COMPLETE CBC W/AUTO DIFF WBC: CPT

## 2019-07-07 PROCEDURE — 94150 VITAL CAPACITY TEST: CPT

## 2019-07-07 PROCEDURE — 99223 1ST HOSP IP/OBS HIGH 75: CPT | Performed by: INTERNAL MEDICINE

## 2019-07-07 PROCEDURE — 6360000002 HC RX W HCPCS: Performed by: INTERNAL MEDICINE

## 2019-07-07 RX ORDER — GUAIFENESIN/DEXTROMETHORPHAN 100-10MG/5
5 SYRUP ORAL EVERY 4 HOURS PRN
Status: DISCONTINUED | OUTPATIENT
Start: 2019-07-07 | End: 2019-07-09 | Stop reason: HOSPADM

## 2019-07-07 RX ORDER — POTASSIUM CHLORIDE 20 MEQ/1
40 TABLET, EXTENDED RELEASE ORAL ONCE
Status: COMPLETED | OUTPATIENT
Start: 2019-07-07 | End: 2019-07-07

## 2019-07-07 RX ORDER — SODIUM CHLORIDE AND POTASSIUM CHLORIDE .9; .15 G/100ML; G/100ML
SOLUTION INTRAVENOUS CONTINUOUS
Status: DISCONTINUED | OUTPATIENT
Start: 2019-07-07 | End: 2019-07-07

## 2019-07-07 RX ORDER — INSULIN GLARGINE 100 [IU]/ML
10 INJECTION, SOLUTION SUBCUTANEOUS ONCE
Status: COMPLETED | OUTPATIENT
Start: 2019-07-07 | End: 2019-07-07

## 2019-07-07 RX ORDER — CALCIUM CARBONATE 500(1250)
500 TABLET ORAL DAILY
COMMUNITY

## 2019-07-07 RX ADMIN — INSULIN GLARGINE 10 UNITS: 100 INJECTION, SOLUTION SUBCUTANEOUS at 12:06

## 2019-07-07 RX ADMIN — SODIUM CHLORIDE: 234 INJECTION INTRAMUSCULAR; INTRAVENOUS; SUBCUTANEOUS at 16:03

## 2019-07-07 RX ADMIN — ASPIRIN 81 MG: 81 TABLET, COATED ORAL at 08:26

## 2019-07-07 RX ADMIN — POTASSIUM CHLORIDE AND SODIUM CHLORIDE: 900; 150 INJECTION, SOLUTION INTRAVENOUS at 08:15

## 2019-07-07 RX ADMIN — GUAIFENESIN AND DEXTROMETHORPHAN 5 ML: 100; 10 SYRUP ORAL at 12:43

## 2019-07-07 RX ADMIN — SODIUM CHLORIDE: 9 INJECTION, SOLUTION INTRAVENOUS at 01:26

## 2019-07-07 RX ADMIN — POTASSIUM CHLORIDE 40 MEQ: 20 TABLET, EXTENDED RELEASE ORAL at 08:21

## 2019-07-07 RX ADMIN — Medication 10 ML: at 21:26

## 2019-07-07 RX ADMIN — INSULIN LISPRO 3 UNITS: 100 INJECTION, SOLUTION INTRAVENOUS; SUBCUTANEOUS at 08:27

## 2019-07-07 RX ADMIN — INSULIN GLARGINE 25 UNITS: 100 INJECTION, SOLUTION SUBCUTANEOUS at 21:27

## 2019-07-07 RX ADMIN — PREDNISONE 4 MG: 1 TABLET ORAL at 08:27

## 2019-07-07 RX ADMIN — PANTOPRAZOLE SODIUM 40 MG: 40 TABLET, DELAYED RELEASE ORAL at 07:56

## 2019-07-07 RX ADMIN — POTASSIUM CHLORIDE 40 MEQ: 20 TABLET, EXTENDED RELEASE ORAL at 16:04

## 2019-07-07 RX ADMIN — GABAPENTIN 300 MG: 300 CAPSULE ORAL at 01:26

## 2019-07-07 RX ADMIN — INSULIN LISPRO 5 UNITS: 100 INJECTION, SOLUTION INTRAVENOUS; SUBCUTANEOUS at 17:23

## 2019-07-07 RX ADMIN — ATORVASTATIN CALCIUM 40 MG: 40 TABLET, FILM COATED ORAL at 21:26

## 2019-07-07 RX ADMIN — CETIRIZINE HYDROCHLORIDE 10 MG: 10 TABLET ORAL at 08:28

## 2019-07-07 RX ADMIN — FOLIC ACID 1 MG: 1 TABLET ORAL at 08:25

## 2019-07-07 RX ADMIN — INSULIN LISPRO 3 UNITS: 100 INJECTION, SOLUTION INTRAVENOUS; SUBCUTANEOUS at 21:27

## 2019-07-07 RX ADMIN — ENOXAPARIN SODIUM 40 MG: 40 INJECTION SUBCUTANEOUS at 08:28

## 2019-07-07 RX ADMIN — INSULIN GLARGINE 25 UNITS: 100 INJECTION, SOLUTION SUBCUTANEOUS at 01:36

## 2019-07-07 RX ADMIN — GABAPENTIN 300 MG: 300 CAPSULE ORAL at 21:26

## 2019-07-07 RX ADMIN — FLUTICASONE PROPIONATE 2 SPRAY: 50 SPRAY, METERED NASAL at 12:06

## 2019-07-07 RX ADMIN — ATORVASTATIN CALCIUM 40 MG: 40 TABLET, FILM COATED ORAL at 01:26

## 2019-07-07 RX ADMIN — ROFLUMILAST 500 MCG: 500 TABLET ORAL at 08:27

## 2019-07-07 RX ADMIN — GUAIFENESIN AND DEXTROMETHORPHAN 5 ML: 100; 10 SYRUP ORAL at 23:20

## 2019-07-07 RX ADMIN — INSULIN LISPRO 2 UNITS: 100 INJECTION, SOLUTION INTRAVENOUS; SUBCUTANEOUS at 12:06

## 2019-07-07 RX ADMIN — GUAIFENESIN AND DEXTROMETHORPHAN 5 ML: 100; 10 SYRUP ORAL at 18:54

## 2019-07-07 RX ADMIN — METOPROLOL SUCCINATE 25 MG: 25 TABLET, FILM COATED, EXTENDED RELEASE ORAL at 08:25

## 2019-07-07 ASSESSMENT — PAIN SCALES - GENERAL
PAINLEVEL_OUTOF10: 0

## 2019-07-07 NOTE — CONSULTS
Patient seen and examined, consult note dictated. Assessment and Plan:    1- ISI: Likely secondary to a pre-renal component with notable improvement after volume expansion. Her urinary output is well maintained and her serum creatinine is back to baseline range, monitor. 2- Hyponatremia: In the setting of decreased solutes, increased free water intake and diuretics. Her corrected serum sodium is ~ 120 meq/L (glucose 395); we will resume concentrated normal saline and monitor to avoid overcorrection. 3- Hypokalemia: Improving with PRN potassium replacement. 4- HTN: Blood pressure within acceptable range. 5- Anemia: Stable hemoglobin, monitor.

## 2019-07-07 NOTE — H&P
Admission H & P    Jimena Rosen;  MRN: [id number] ;   Admit Date: 7/6/2019  7:11 PM   Current Date and Time: 7/7/2019 8:02 AM    PCP  --  Anton Kuo MD         Chief Complaint   Patient presents with    Dizziness     pt c/o dizziness with fatigue that has been going on but has progressively gotten worse the past couple days.  Fatigue         HPI:  Patient is a 80 y.o.  female  With known h.o  RA on immunosuppression with EMBREL and prednisone 4 mg, HTN, chronic pedal edema, COPD, IDDM  presents with increasing dizziness , fatigue and not feeling well for few days. Reports she has been on lasix 40 mg bid for pedal edema and takes 5 mg metolozone couple times a week for weight gain. Has taken about 3 times last week and once this week for weight gain and now with above symptoms  No nausea or vomiting, no diarrhea. No chest pain or sob, but has cough   No falls.  No headache     Allergies   Allergen Reactions    Atenolol      Cough         Past Medical History:   Diagnosis Date    Arthritis     Back pain     Bronchiectasis ([address] 10/15/2014    Chronic rheumatic arthritis ([address]     Diabetes mellitus (HCC)     GERD (gastroesophageal reflux disease)     Hypertension     Pneumonia     Psoriasis     hands and feet    RA (rheumatoid arthritis) ([address]     Tracheomalacia 06/2019      Past Surgical History:   Procedure Laterality Date    ARTERY SURGERY  5/30/13    left temporal artery biopsy    BRONCHOSCOPY      BRONCHOSCOPY N/A 6/12/2019    BRONCHOSCOPY ALVEOLAR LAVAGE performed by Vasu Mustafa MD at [address] [place] [address]      HERNIA REPAIR      KNEE ARTHROSCOPY      left    KYPHOSIS SURGERY      LAMINECTOMY      MANDIBLE FRACTURE SURGERY      SEPTOPLASTY  5/7/2013    FESS with balloon    SPINAL FUSION      TUBAL LIGATION      UPPER GASTROINTESTINAL ENDOSCOPY  4/8/2014    Dilitation      Medications Prior to Admission: vitamin D (CHOLECALCIFEROL) 1000 UNIT TABS tablet, Take 2,000 Units by mouth daily  calcium carbonate (OSCAL) 500 MG TABS tablet, Take 500 mg by mouth daily  metolazone (ZAROXOLYN) 5 MG tablet, TAKE 1 TABLET BY MOUTH EVERY DAY (Patient taking differently: as needed)  ipratropium (ATROVENT) 0.06 % nasal spray, 2 SPRAYS BY NASAL ROUTE 2-4 TIMES DAILY  atorvastatin (LIPITOR) 40 MG tablet, Take 40 mg by mouth  furosemide (LASIX) 40 MG tablet, Take 40 mg by mouth 2 times daily  cyclobenzaprine (FLEXERIL) 10 MG tablet, Take 10 mg by mouth  Insulin Syringe-Needle U-100 31G X 5/16\" 0.5 ML MISC, USE 5 TIMES DAILY  meloxicam (MOBIC) 15 MG tablet, Historical Medication  ACCU-CHEK CEDRICK PLUS strip, TEST 4 TIMES DAILY  HYDROcodone-acetaminophen (NORCO) 7.5-325 MG per tablet, TAKE 1 TABLET BY MOUTH 3 TIMES A DAY AS NEEDED  Roflumilast (DALIRESP) 500 MCG tablet, Take 500 mcg by mouth daily  albuterol sulfate  (90 Base) MCG/ACT inhaler, Inhale 2 puffs into the lungs every 4 hours as needed for Wheezing  metoprolol succinate (TOPROL XL) 25 MG extended release tablet, Take 25 mg by mouth daily  predniSONE (DELTASONE) 1 MG tablet, Take 4 mg by mouth daily  insulin glargine (LANTUS) 100 UNIT/ML injection vial, Inject 25 Units into the skin nightly  aspirin EC 81 MG EC tablet, Take 1 tablet by mouth daily  gabapentin (NEURONTIN) 300 MG capsule, Take 1 capsule by mouth nightly  insulin lispro (HUMALOG) 100 UNIT/ML injection vial, Inject 0-12 Units into the skin 3 times daily (with meals)  spironolactone (ALDACTONE) 50 MG tablet, Take 1 tablet by mouth daily  Misc. Devices (ACAPELLA) MISC, Take 1 Device by mouth as needed  fluticasone (FLONASE) 50 MCG/ACT nasal spray, INHALE 2 SPRAYS IN EACH NOSTRIL DAILY  cetirizine (ZYRTEC) 10 MG tablet, Take 10 mg by mouth daily. etanercept (ENBREL) 50 MG/ML injection, Inject 50 mg into the skin every 7 days.   omeprazole (PRILOSEC) 40 MG capsule, Take 40 mg by mouth daily   folic acid (FOLVITE) 1 MG tablet, Take 1 mg by mouth daily. methotrexate 2.5 MG tablet, Take 2.5 mg by mouth 3 Every other week  rosuvastatin (CRESTOR) 10 MG tablet, Take 10 mg by mouth daily  Allergies   Allergen Reactions    Atenolol      Cough        Social History     Tobacco Use    Smoking status: Former Smoker     Packs/day: 1.00     Years: 20.00     Pack years: 20.00     Types: Cigarettes     Last attempt to quit: 1991     Years since quittin.2    Smokeless tobacco: Never Used   Substance Use Topics    Alcohol use: Not Currently     Alcohol/week: 0.0 oz     Comment: rarely      Family History   Problem Relation Age of Onset    Diabetes Mother     Hypertension Mother     Asthma Other     Heart Disease Brother     Diabetes Brother     Diabetes Sister     Heart Disease Brother     Cancer Neg Hx     Emphysema Neg Hx     Heart Failure Neg Hx           Review of systems      Constitutional: Negative for fever or chills + fatigue  HENT: Negative for sore throat   Eyes: Negative for redness   Respiratory: Negative  for dyspnea, cough   Cardiovascular: Negative for chest pain   Gastrointestinal:neg for abd pain, nausea or vomiting or diarrhea  No melena or BRPR  Genitourinary: Negative for hematuria   Musculoskeletal: Negative for arthralgias   Skin: Negative for rash   Neurological: Negative for syncope + dizziness and lethargy  Hematological: Negative for adenopathy   Psychiatric/Behavorial: Negative for anxiety      Objective:     VS: Temp: 97.8 °F (36.6 °C)  Pulse: 65  BP: 123/73  SpO2: 94 %        Physical Exam:  General: eldelry female sick appearing    Awake, alert and oriented.  Appears to be not in any distress  Mucous Membranes:  Pink , anicteric  Neck: No JVD, no carotid bruit, no thyromegaly  Chest:  Clear to auscultation bilaterally, no added sounds  Cardiovascular:  RRR S1S2 heard, no murmurs or gallops  Abdomen:  Soft, undistended, non tender, no organomegaly, BS present  Extremities: tanned skin with Damian Vernon MD 7/7/2019 8:02 AM

## 2019-07-08 ENCOUNTER — APPOINTMENT (OUTPATIENT)
Dept: GENERAL RADIOLOGY | Age: 69
DRG: 683 | End: 2019-07-08
Payer: MEDICARE

## 2019-07-08 LAB
ANION GAP SERPL CALCULATED.3IONS-SCNC: 10 MMOL/L (ref 3–16)
ANION GAP SERPL CALCULATED.3IONS-SCNC: 11 MMOL/L (ref 3–16)
ANION GAP SERPL CALCULATED.3IONS-SCNC: 11 MMOL/L (ref 3–16)
BUN BLDV-MCNC: 16 MG/DL (ref 7–20)
BUN BLDV-MCNC: 21 MG/DL (ref 7–20)
BUN BLDV-MCNC: 24 MG/DL (ref 7–20)
CALCIUM SERPL-MCNC: 8.3 MG/DL (ref 8.3–10.6)
CALCIUM SERPL-MCNC: 8.3 MG/DL (ref 8.3–10.6)
CALCIUM SERPL-MCNC: 8.8 MG/DL (ref 8.3–10.6)
CHLORIDE BLD-SCNC: 85 MMOL/L (ref 99–110)
CHLORIDE BLD-SCNC: 86 MMOL/L (ref 99–110)
CHLORIDE BLD-SCNC: 89 MMOL/L (ref 99–110)
CO2: 27 MMOL/L (ref 21–32)
CO2: 28 MMOL/L (ref 21–32)
CO2: 28 MMOL/L (ref 21–32)
CREAT SERPL-MCNC: 0.9 MG/DL (ref 0.6–1.2)
CREAT SERPL-MCNC: 1 MG/DL (ref 0.6–1.2)
CREAT SERPL-MCNC: 1.1 MG/DL (ref 0.6–1.2)
GFR AFRICAN AMERICAN: 60
GFR AFRICAN AMERICAN: >60
GFR AFRICAN AMERICAN: >60
GFR NON-AFRICAN AMERICAN: 49
GFR NON-AFRICAN AMERICAN: 55
GFR NON-AFRICAN AMERICAN: >60
GLUCOSE BLD-MCNC: 257 MG/DL (ref 70–99)
GLUCOSE BLD-MCNC: 262 MG/DL (ref 70–99)
GLUCOSE BLD-MCNC: 309 MG/DL (ref 70–99)
GLUCOSE BLD-MCNC: 321 MG/DL (ref 70–99)
GLUCOSE BLD-MCNC: 334 MG/DL (ref 70–99)
GLUCOSE BLD-MCNC: 48 MG/DL (ref 70–99)
GLUCOSE BLD-MCNC: 86 MG/DL (ref 70–99)
GLUCOSE BLD-MCNC: 93 MG/DL (ref 70–99)
GLUCOSE BLD-MCNC: 98 MG/DL (ref 70–99)
OSMOLALITY URINE: 441 MOSM/KG (ref 390–1070)
PERFORMED ON: ABNORMAL
PERFORMED ON: NORMAL
POTASSIUM SERPL-SCNC: 3 MMOL/L (ref 3.5–5.1)
POTASSIUM SERPL-SCNC: 3.5 MMOL/L (ref 3.5–5.1)
POTASSIUM SERPL-SCNC: 4.2 MMOL/L (ref 3.5–5.1)
SODIUM BLD-SCNC: 123 MMOL/L (ref 136–145)
SODIUM BLD-SCNC: 124 MMOL/L (ref 136–145)
SODIUM BLD-SCNC: 128 MMOL/L (ref 136–145)

## 2019-07-08 PROCEDURE — 96375 TX/PRO/DX INJ NEW DRUG ADDON: CPT

## 2019-07-08 PROCEDURE — 84300 ASSAY OF URINE SODIUM: CPT

## 2019-07-08 PROCEDURE — 71046 X-RAY EXAM CHEST 2 VIEWS: CPT

## 2019-07-08 PROCEDURE — 6370000000 HC RX 637 (ALT 250 FOR IP): Performed by: INTERNAL MEDICINE

## 2019-07-08 PROCEDURE — 6370000000 HC RX 637 (ALT 250 FOR IP): Performed by: HOSPITALIST

## 2019-07-08 PROCEDURE — 2580000003 HC RX 258: Performed by: HOSPITALIST

## 2019-07-08 PROCEDURE — 2580000003 HC RX 258: Performed by: INTERNAL MEDICINE

## 2019-07-08 PROCEDURE — 99232 SBSQ HOSP IP/OBS MODERATE 35: CPT | Performed by: INTERNAL MEDICINE

## 2019-07-08 PROCEDURE — 1200000000 HC SEMI PRIVATE

## 2019-07-08 PROCEDURE — 96366 THER/PROPH/DIAG IV INF ADDON: CPT

## 2019-07-08 PROCEDURE — 6360000002 HC RX W HCPCS: Performed by: HOSPITALIST

## 2019-07-08 PROCEDURE — 83935 ASSAY OF URINE OSMOLALITY: CPT

## 2019-07-08 PROCEDURE — 80048 BASIC METABOLIC PNL TOTAL CA: CPT

## 2019-07-08 PROCEDURE — 96372 THER/PROPH/DIAG INJ SC/IM: CPT

## 2019-07-08 PROCEDURE — 36415 COLL VENOUS BLD VENIPUNCTURE: CPT

## 2019-07-08 RX ORDER — DEXTROSE MONOHYDRATE 50 MG/ML
INJECTION, SOLUTION INTRAVENOUS CONTINUOUS
Status: DISPENSED | OUTPATIENT
Start: 2019-07-08 | End: 2019-07-08

## 2019-07-08 RX ORDER — POTASSIUM CHLORIDE 20 MEQ/1
40 TABLET, EXTENDED RELEASE ORAL ONCE
Status: COMPLETED | OUTPATIENT
Start: 2019-07-08 | End: 2019-07-08

## 2019-07-08 RX ADMIN — METOPROLOL SUCCINATE 25 MG: 25 TABLET, FILM COATED, EXTENDED RELEASE ORAL at 09:05

## 2019-07-08 RX ADMIN — FLUTICASONE PROPIONATE 2 SPRAY: 50 SPRAY, METERED NASAL at 09:11

## 2019-07-08 RX ADMIN — PANTOPRAZOLE SODIUM 40 MG: 40 TABLET, DELAYED RELEASE ORAL at 05:30

## 2019-07-08 RX ADMIN — INSULIN LISPRO 3 UNITS: 100 INJECTION, SOLUTION INTRAVENOUS; SUBCUTANEOUS at 12:21

## 2019-07-08 RX ADMIN — GUAIFENESIN AND DEXTROMETHORPHAN 5 ML: 100; 10 SYRUP ORAL at 09:04

## 2019-07-08 RX ADMIN — FOLIC ACID 1 MG: 1 TABLET ORAL at 09:05

## 2019-07-08 RX ADMIN — INSULIN GLARGINE 25 UNITS: 100 INJECTION, SOLUTION SUBCUTANEOUS at 21:07

## 2019-07-08 RX ADMIN — Medication 10 ML: at 22:20

## 2019-07-08 RX ADMIN — ATORVASTATIN CALCIUM 40 MG: 40 TABLET, FILM COATED ORAL at 21:06

## 2019-07-08 RX ADMIN — DEXTROSE: 5 SOLUTION INTRAVENOUS at 06:52

## 2019-07-08 RX ADMIN — ASPIRIN 81 MG: 81 TABLET, COATED ORAL at 09:05

## 2019-07-08 RX ADMIN — HYDROCODONE BITARTRATE AND ACETAMINOPHEN 1 TABLET: 7.5; 325 TABLET ORAL at 21:05

## 2019-07-08 RX ADMIN — Medication 10 ML: at 09:05

## 2019-07-08 RX ADMIN — ENOXAPARIN SODIUM 40 MG: 40 INJECTION SUBCUTANEOUS at 09:05

## 2019-07-08 RX ADMIN — INSULIN LISPRO 4 UNITS: 100 INJECTION, SOLUTION INTRAVENOUS; SUBCUTANEOUS at 17:30

## 2019-07-08 RX ADMIN — GABAPENTIN 300 MG: 300 CAPSULE ORAL at 21:06

## 2019-07-08 RX ADMIN — POTASSIUM CHLORIDE 40 MEQ: 20 TABLET, EXTENDED RELEASE ORAL at 15:58

## 2019-07-08 RX ADMIN — SODIUM CHLORIDE: 234 INJECTION INTRAMUSCULAR; INTRAVENOUS; SUBCUTANEOUS at 05:30

## 2019-07-08 RX ADMIN — GUAIFENESIN AND DEXTROMETHORPHAN 5 ML: 100; 10 SYRUP ORAL at 22:17

## 2019-07-08 RX ADMIN — CETIRIZINE HYDROCHLORIDE 10 MG: 10 TABLET ORAL at 09:05

## 2019-07-08 RX ADMIN — ROFLUMILAST 500 MCG: 500 TABLET ORAL at 09:04

## 2019-07-08 RX ADMIN — PREDNISONE 4 MG: 1 TABLET ORAL at 09:04

## 2019-07-08 RX ADMIN — INSULIN LISPRO 2 UNITS: 100 INJECTION, SOLUTION INTRAVENOUS; SUBCUTANEOUS at 21:07

## 2019-07-08 ASSESSMENT — PAIN DESCRIPTION - PAIN TYPE: TYPE: CHRONIC PAIN

## 2019-07-08 ASSESSMENT — PAIN DESCRIPTION - FREQUENCY: FREQUENCY: CONTINUOUS

## 2019-07-08 ASSESSMENT — PAIN SCALES - GENERAL: PAINLEVEL_OUTOF10: 6

## 2019-07-08 ASSESSMENT — PAIN DESCRIPTION - DESCRIPTORS: DESCRIPTORS: ACHING;DISCOMFORT

## 2019-07-08 ASSESSMENT — PAIN DESCRIPTION - LOCATION: LOCATION: BACK

## 2019-07-08 NOTE — PROGRESS NOTES
Left message with call center for nephrology to return call regarding Na level of 128.     Dr Jalloh Anger on call

## 2019-07-08 NOTE — PROGRESS NOTES
tender, no organomegaly, BS present  Extremities: No edema or cyanosis. Distal pulses well felt  Neurological : no focal deficits    Lab Data:  CBC:   Recent Labs     07/06/19  1848 07/07/19  0531   WBC 6.0 5.8   RBC 3.54* 3.87*   HGB 10.9* 11.8*   HCT 31.6* 34.5*   MCV 89.3 89.0   RDW 13.5 13.8    222     BMP:   Recent Labs     07/07/19  1804 07/07/19  2313 07/08/19  0527   * 124* 128*   K 3.9 3.5 3.0*   CL 81* 86* 89*   CO2 30 28 28   BUN 25* 24* 21*   CREATININE 1.1 1.1 1.0     BNP: No results for input(s): BNP in the last 72 hours. PT/INR: No results for input(s): PROTIME, INR in the last 72 hours. APTT:No results for input(s): APTT in the last 72 hours. CARDIAC ENZYMES:   Recent Labs     07/06/19 1848   TROPONINI <0.01     FASTING LIPID PANEL:  Lab Results   Component Value Date    CHOL 162 07/22/2010    HDL 43 07/22/2010    TRIG 65 07/22/2010     LIVER PROFILE:   Recent Labs     07/06/19  1848   AST 18   ALT 12   BILITOT 0.8   ALKPHOS 98           Assessment & Plan:     Patient Active Problem List    Diagnosis Date Noted    Hypokalemia     Fatigue     ISI (acute kidney injury) ([address] 07/06/2019    Mucus plugging of bronchi     Tracheobronchomalacia     Immunocompromised state ([address]     Cylindrical bronchiectasis ([address] 12/19/2018    Bronchiectasis with acute exacerbation ([address]     Former smoker 10/19/2017    Hyponatremia 10/19/2017    Hypochloremia 10/19/2017    Acute hyperglycemia 10/19/2017    DM2/IDDM 10/19/2017    Sepsis ([address] 10/19/2017    Chronic normocytic anemia 10/19/2017    Psoriasis     Hypertension     GERD (gastroesophageal reflux disease)     Community acquired pneumonia of left lower lobe of lung ([address]     Chest pain 10/18/2017    LLL pneumonia ([address] 10/18/2017    Bronchiectasis ([address] 10/15/2014    Rheumatoid arthritis ([facility] 01/19/2012        1.  Dizziness and fatigue - sec to dehydration with excessive diuresis  - improving with IVF rehydration  - replace electrolytes     2. ARF - sec to diuresis. Holding lasix and metolazone. Creatinine improved from 1.5 to 1.0 .      3. Hypokalemia - severe , replace and recheck today. Mag ok      4. Hyponatremia - likely volume depletion. Better with IV NS. Given D5 as it corrected too quickly     4. Chronic RA - on embrel, prednisone .      5. DM- 2- IDDM - resume lantus with ssi. Increase to home dose of 45 units as needed     6. Severe COPD with tracheomalacia - f/w Dr. Jaun Jefferson .  Continue daliresp and HHN  CXR as she has a cough now     Diet:carb controlled   GI/DVT PX: /lovenox  CODE STATUS: full           Glorya Crest

## 2019-07-08 NOTE — PROGRESS NOTES
Received call from lab na 116, call placed to md, new orders received to stop fluids and consult nephrology. Dom Mae RN

## 2019-07-09 VITALS
TEMPERATURE: 97 F | SYSTOLIC BLOOD PRESSURE: 150 MMHG | HEART RATE: 66 BPM | OXYGEN SATURATION: 97 % | WEIGHT: 199.7 LBS | DIASTOLIC BLOOD PRESSURE: 65 MMHG | RESPIRATION RATE: 14 BRPM | HEIGHT: 71 IN | BODY MASS INDEX: 27.96 KG/M2

## 2019-07-09 LAB
ANION GAP SERPL CALCULATED.3IONS-SCNC: 11 MMOL/L (ref 3–16)
ANION GAP SERPL CALCULATED.3IONS-SCNC: 11 MMOL/L (ref 3–16)
BUN BLDV-MCNC: 12 MG/DL (ref 7–20)
BUN BLDV-MCNC: 15 MG/DL (ref 7–20)
CALCIUM SERPL-MCNC: 8.6 MG/DL (ref 8.3–10.6)
CALCIUM SERPL-MCNC: 9 MG/DL (ref 8.3–10.6)
CHLORIDE BLD-SCNC: 91 MMOL/L (ref 99–110)
CHLORIDE BLD-SCNC: 92 MMOL/L (ref 99–110)
CO2: 27 MMOL/L (ref 21–32)
CO2: 29 MMOL/L (ref 21–32)
CREAT SERPL-MCNC: 0.8 MG/DL (ref 0.6–1.2)
CREAT SERPL-MCNC: 0.8 MG/DL (ref 0.6–1.2)
GFR AFRICAN AMERICAN: >60
GFR AFRICAN AMERICAN: >60
GFR NON-AFRICAN AMERICAN: >60
GFR NON-AFRICAN AMERICAN: >60
GLUCOSE BLD-MCNC: 187 MG/DL (ref 70–99)
GLUCOSE BLD-MCNC: 209 MG/DL (ref 70–99)
GLUCOSE BLD-MCNC: 51 MG/DL (ref 70–99)
GLUCOSE BLD-MCNC: 75 MG/DL (ref 70–99)
GLUCOSE BLD-MCNC: 88 MG/DL (ref 70–99)
GLUCOSE BLD-MCNC: 90 MG/DL (ref 70–99)
PERFORMED ON: ABNORMAL
PERFORMED ON: ABNORMAL
PERFORMED ON: NORMAL
PERFORMED ON: NORMAL
POTASSIUM SERPL-SCNC: 4.3 MMOL/L (ref 3.5–5.1)
POTASSIUM SERPL-SCNC: 4.6 MMOL/L (ref 3.5–5.1)
SODIUM BLD-SCNC: 129 MMOL/L (ref 136–145)
SODIUM BLD-SCNC: 132 MMOL/L (ref 136–145)
SODIUM URINE: 60 MMOL/L

## 2019-07-09 PROCEDURE — 2580000003 HC RX 258: Performed by: HOSPITALIST

## 2019-07-09 PROCEDURE — 6360000002 HC RX W HCPCS: Performed by: HOSPITALIST

## 2019-07-09 PROCEDURE — 36415 COLL VENOUS BLD VENIPUNCTURE: CPT

## 2019-07-09 PROCEDURE — 80048 BASIC METABOLIC PNL TOTAL CA: CPT

## 2019-07-09 PROCEDURE — 6370000000 HC RX 637 (ALT 250 FOR IP): Performed by: HOSPITALIST

## 2019-07-09 PROCEDURE — 99239 HOSP IP/OBS DSCHRG MGMT >30: CPT | Performed by: INTERNAL MEDICINE

## 2019-07-09 PROCEDURE — 96372 THER/PROPH/DIAG INJ SC/IM: CPT

## 2019-07-09 RX ORDER — FUROSEMIDE 40 MG/1
40 TABLET ORAL DAILY
Status: DISCONTINUED | OUTPATIENT
Start: 2019-07-10 | End: 2019-07-09 | Stop reason: HOSPADM

## 2019-07-09 RX ORDER — FUROSEMIDE 40 MG/1
40 TABLET ORAL DAILY
Qty: 1 TABLET | Refills: 0
Start: 2019-07-09 | End: 2020-05-19 | Stop reason: CLARIF

## 2019-07-09 RX ORDER — INSULIN GLARGINE 100 [IU]/ML
20 INJECTION, SOLUTION SUBCUTANEOUS NIGHTLY
Status: DISCONTINUED | OUTPATIENT
Start: 2019-07-09 | End: 2019-07-09 | Stop reason: HOSPADM

## 2019-07-09 RX ORDER — SPIRONOLACTONE 50 MG/1
25 TABLET, FILM COATED ORAL DAILY
Qty: 1 TABLET | Refills: 0
Start: 2019-07-09 | End: 2020-11-19 | Stop reason: CLARIF

## 2019-07-09 RX ORDER — SPIRONOLACTONE 25 MG/1
25 TABLET ORAL DAILY
Status: DISCONTINUED | OUTPATIENT
Start: 2019-07-10 | End: 2019-07-09 | Stop reason: HOSPADM

## 2019-07-09 RX ADMIN — FLUTICASONE PROPIONATE 2 SPRAY: 50 SPRAY, METERED NASAL at 08:51

## 2019-07-09 RX ADMIN — ASPIRIN 81 MG: 81 TABLET, COATED ORAL at 08:48

## 2019-07-09 RX ADMIN — METOPROLOL SUCCINATE 25 MG: 25 TABLET, FILM COATED, EXTENDED RELEASE ORAL at 08:48

## 2019-07-09 RX ADMIN — FOLIC ACID 1 MG: 1 TABLET ORAL at 08:48

## 2019-07-09 RX ADMIN — CETIRIZINE HYDROCHLORIDE 10 MG: 10 TABLET ORAL at 08:48

## 2019-07-09 RX ADMIN — Medication 10 ML: at 08:50

## 2019-07-09 RX ADMIN — ENOXAPARIN SODIUM 40 MG: 40 INJECTION SUBCUTANEOUS at 08:50

## 2019-07-09 RX ADMIN — ROFLUMILAST 500 MCG: 500 TABLET ORAL at 08:48

## 2019-07-09 RX ADMIN — INSULIN LISPRO 2 UNITS: 100 INJECTION, SOLUTION INTRAVENOUS; SUBCUTANEOUS at 11:52

## 2019-07-09 RX ADMIN — PANTOPRAZOLE SODIUM 40 MG: 40 TABLET, DELAYED RELEASE ORAL at 08:48

## 2019-07-09 NOTE — PROGRESS NOTES
Pt with a run of V.tach while up using the bathroom. Pt denies feeling dizzy or having pain at this time. MD notified. Charge notified. Pt back to bed.  Cough medicine given per request.

## 2019-07-09 NOTE — PROGRESS NOTES
Nephrology Progress Note   [email or web address]      This patient is a 80year old female whom we are following for ISI and hyponatremia. Subjective: The patient was seen and examined. Feels well. Serum sodium up to 132mmol/L. Family History: No family at bedside  ROS: No nausea, vomiting or headache. Vitals:  BP (!) 150/65   Pulse 66   Temp 97 °F (36.1 °C) (Oral)   Resp 14   Ht 5' 10.5\" (1.791 m)   Wt 199 lb 11.2 oz (90.6 kg)   SpO2 97%   BMI 28.25 kg/m²   I/O last 3 completed shifts: In: 2566 [P.O.:900; I.V.:1666]  Out: -   I/O this shift:  In: 120 [P.O.:120]  Out: -     Physical Exam:  Physical Exam   Constitutional: She is oriented to person, place, and time. She is cooperative. No distress. HENT:   Head: Normocephalic and atraumatic. Mouth/Throat: Oropharynx is clear and moist.   Eyes: Conjunctivae are normal. No scleral icterus. Neck: No tracheal deviation present. No thyromegaly present. Cardiovascular: Normal rate. Exam reveals no gallop and no friction rub. Pulmonary/Chest: Effort normal. No respiratory distress. She has no wheezes. She has no rales. Abdominal: Soft. Bowel sounds are normal. She exhibits no distension. There is no tenderness. Musculoskeletal: She exhibits no edema. Neurological: She is alert and oriented to person, place, and time. Skin: Skin is warm. Psychiatric: She has a normal mood and affect. Vitals reviewed.         Medications:   insulin glargine  20 Units Subcutaneous Nightly    aspirin EC  81 mg Oral Daily    atorvastatin  40 mg Oral Nightly    cetirizine  10 mg Oral Daily    fluticasone  2 spray Each Nostril Daily    folic acid  1 mg Oral Daily    gabapentin  300 mg Oral Nightly    ipratropium  2 spray Each Nostril BID    metoprolol succinate  25 mg Oral Daily    pantoprazole  40 mg Oral QAM AC    predniSONE  4 mg Oral Daily    Roflumilast  500 mcg Oral Daily    sodium chloride flush  10 mL Intravenous 2 times per day   

## 2019-07-09 NOTE — PROGRESS NOTES
Shift assessment completed. Pt is alert and oriented. Vital signs are WNL. Respirations are even & easy. Pt complains of pain to back. Prn meds given. Pt states she feels better. Pillow provided for under feet. Pt denies needs at this time. SR up x 2 and bed in low position. Call light is within reach. Will monitor.

## 2019-07-09 NOTE — PROGRESS NOTES
diuresis. Holding lasix and metolazone. Creatinine improved from 1.5 to 1.0 .      3. Hypokalemia - severe , replace and recheck today. Mag ok      4. Hyponatremia - likely volume depletion. Better with IV NS. Given D5 as it corrected too quickly     4. Chronic RA - on embrel, prednisone .      5. DM- 2- IDDM - resume lantus with ssi. Increase to home dose of 45 units as needed     6. Severe COPD with tracheomalacia - f/w Dr. Cynthia Bardales .  Continue daliresp and HHN  CXR as she has a cough now-Chronic interstitial changes are stable in the chest with no acute finding.     Diet:carb controlled   GI/DVT PX: /lovenox  CODE STATUS: full     D/c later  today if ok with harriet Christensen

## 2019-07-10 NOTE — DISCHARGE SUMMARY
the chest with no acute finding.           Discharge Medications     Medication List      CHANGE how you take these medications    furosemide 40 MG tablet  Commonly known as:  LASIX  Take 1 tablet by mouth daily  What changed:  when to take this     spironolactone 50 MG tablet  Commonly known as:  ALDACTONE  Take 0.5 tablets by mouth daily  What changed:  how much to take        CONTINUE taking these medications    ACAPELLA Misc  Take 1 Device by mouth as needed     ACCU-CHEK CEDRICK PLUS strip  Generic drug:  blood glucose test strips     albuterol sulfate  (90 Base) MCG/ACT inhaler  Inhale 2 puffs into the lungs every 4 hours as needed for Wheezing     aspirin EC 81 MG EC tablet     atorvastatin 40 MG tablet  Commonly known as:  LIPITOR     calcium carbonate 500 MG Tabs tablet  Commonly known as:  OSCAL     cetirizine 10 MG tablet  Commonly known as:  ZYRTEC     CRESTOR 10 MG tablet  Generic drug:  rosuvastatin     cyclobenzaprine 10 MG tablet  Commonly known as:  FLEXERIL     ENBREL 50 MG/ML injection  Generic drug:  etanercept     fluticasone 50 MCG/ACT nasal spray  Commonly known as:  FLONASE  INHALE 2 SPRAYS IN EACH NOSTRIL DAILY     folic acid 1 MG tablet  Commonly known as:  FOLVITE     gabapentin 300 MG capsule  Commonly known as:  NEURONTIN     HYDROcodone-acetaminophen 7.5-325 MG per tablet  Commonly known as:  NORCO     insulin lispro 100 UNIT/ML injection vial  Commonly known as:  HUMALOG     Insulin Syringe-Needle U-100 31G X 5/16\" 0.5 ML Misc     ipratropium 0.06 % nasal spray  Commonly known as:  ATROVENT  2 SPRAYS BY NASAL ROUTE 2-4 TIMES DAILY     LANTUS 100 UNIT/ML injection vial  Generic drug:  insulin glargine     meloxicam 15 MG tablet  Commonly known as:  MOBIC     methotrexate 2.5 MG chemo tablet  Commonly known as:  RHEUMATREX     metoprolol succinate 25 MG extended release tablet  Commonly known as:  TOPROL XL     omeprazole 40 MG delayed release capsule  Commonly known as:  PRILOSEC

## 2019-07-15 LAB
FUNGUS (MYCOLOGY) CULTURE: ABNORMAL
FUNGUS (MYCOLOGY) CULTURE: ABNORMAL
FUNGUS (MYCOLOGY) CULTURE: NORMAL
FUNGUS STAIN: ABNORMAL
FUNGUS STAIN: NORMAL
ORGANISM: ABNORMAL

## 2019-07-17 ENCOUNTER — HOSPITAL ENCOUNTER (OUTPATIENT)
Dept: SLEEP CENTER | Age: 69
Discharge: HOME | End: 2019-07-19
Payer: MEDICARE

## 2019-07-17 DIAGNOSIS — G47.30 SLEEP APNEA, UNSPECIFIED TYPE: ICD-10-CM

## 2019-07-17 PROCEDURE — 95810 POLYSOM 6/> YRS 4/> PARAM: CPT

## 2019-07-19 ENCOUNTER — TELEPHONE (OUTPATIENT)
Dept: PULMONOLOGY | Age: 69
End: 2019-07-19

## 2019-07-19 DIAGNOSIS — G47.33 OSA (OBSTRUCTIVE SLEEP APNEA): Primary | ICD-10-CM

## 2019-07-22 RX ORDER — METOLAZONE 5 MG/1
TABLET ORAL
Qty: 30 TABLET | Refills: 0 | OUTPATIENT
Start: 2019-07-22

## 2019-07-30 LAB
AFB CULTURE (MYCOBACTERIA): NORMAL
AFB CULTURE (MYCOBACTERIA): NORMAL
AFB SMEAR: NORMAL
AFB SMEAR: NORMAL

## 2019-08-14 ENCOUNTER — TELEPHONE (OUTPATIENT)
Dept: PULMONOLOGY | Age: 69
End: 2019-08-14

## 2019-08-14 RX ORDER — FLUCONAZOLE 100 MG/1
100 TABLET ORAL DAILY
Qty: 8 TABLET | Refills: 0 | Status: SHIPPED | OUTPATIENT
Start: 2019-08-14 | End: 2019-08-21

## 2019-08-16 RX ORDER — METOLAZONE 5 MG/1
TABLET ORAL
Qty: 30 TABLET | Refills: 0 | Status: ON HOLD | OUTPATIENT
Start: 2019-08-16 | End: 2020-07-01 | Stop reason: SDUPTHER

## 2019-08-19 ENCOUNTER — HOSPITAL ENCOUNTER (OUTPATIENT)
Dept: SLEEP CENTER | Age: 69
Discharge: HOME | End: 2019-08-21
Payer: MEDICARE

## 2019-08-19 DIAGNOSIS — G47.33 OSA (OBSTRUCTIVE SLEEP APNEA): ICD-10-CM

## 2019-08-19 PROCEDURE — 95811 POLYSOM 6/>YRS CPAP 4/> PARM: CPT

## 2019-08-26 DIAGNOSIS — G47.33 OSA (OBSTRUCTIVE SLEEP APNEA): Primary | ICD-10-CM

## 2019-10-17 ENCOUNTER — TELEPHONE (OUTPATIENT)
Dept: PULMONOLOGY | Age: 69
End: 2019-10-17

## 2019-11-26 ENCOUNTER — TELEPHONE (OUTPATIENT)
Dept: PULMONOLOGY | Age: 69
End: 2019-11-26

## 2019-12-04 ENCOUNTER — TELEPHONE (OUTPATIENT)
Dept: PULMONOLOGY | Age: 69
End: 2019-12-04

## 2019-12-04 RX ORDER — PREDNISONE 20 MG/1
40 TABLET ORAL DAILY
Qty: 20 TABLET | Refills: 0 | Status: SHIPPED | OUTPATIENT
Start: 2019-12-04 | End: 2019-12-14

## 2019-12-04 RX ORDER — LEVOFLOXACIN 500 MG/1
500 TABLET, FILM COATED ORAL DAILY
Qty: 8 TABLET | Refills: 0 | Status: SHIPPED | OUTPATIENT
Start: 2019-12-04 | End: 2019-12-12

## 2019-12-06 ENCOUNTER — TELEPHONE (OUTPATIENT)
Dept: PULMONOLOGY | Age: 69
End: 2019-12-06

## 2019-12-11 RX ORDER — AZITHROMYCIN 250 MG/1
TABLET, FILM COATED ORAL
Qty: 30 TABLET | Refills: 2 | Status: SHIPPED | OUTPATIENT
Start: 2019-12-11 | End: 2020-04-02

## 2019-12-12 ENCOUNTER — TELEPHONE (OUTPATIENT)
Dept: PULMONOLOGY | Age: 69
End: 2019-12-12

## 2019-12-12 RX ORDER — FLUCONAZOLE 100 MG/1
100 TABLET ORAL DAILY
Qty: 8 TABLET | Refills: 0 | Status: SHIPPED | OUTPATIENT
Start: 2019-12-12 | End: 2019-12-19

## 2019-12-16 ENCOUNTER — TELEPHONE (OUTPATIENT)
Dept: PULMONOLOGY | Age: 69
End: 2019-12-16

## 2019-12-20 ENCOUNTER — TELEPHONE (OUTPATIENT)
Dept: PULMONOLOGY | Age: 69
End: 2019-12-20

## 2020-01-08 ENCOUNTER — OFFICE VISIT (OUTPATIENT)
Dept: PULMONOLOGY | Age: 70
End: 2020-01-08
Payer: MEDICARE

## 2020-01-08 VITALS
TEMPERATURE: 97.7 F | OXYGEN SATURATION: 94 % | WEIGHT: 203 LBS | HEART RATE: 71 BPM | SYSTOLIC BLOOD PRESSURE: 132 MMHG | BODY MASS INDEX: 28.42 KG/M2 | DIASTOLIC BLOOD PRESSURE: 60 MMHG | RESPIRATION RATE: 18 BRPM | HEIGHT: 71 IN

## 2020-01-08 PROCEDURE — 99214 OFFICE O/P EST MOD 30 MIN: CPT | Performed by: INTERNAL MEDICINE

## 2020-01-08 RX ORDER — PREDNISONE 10 MG/1
TABLET ORAL
Qty: 30 TABLET | Refills: 0 | Status: SHIPPED | OUTPATIENT
Start: 2020-01-08 | End: 2020-05-19 | Stop reason: CLARIF

## 2020-01-08 ASSESSMENT — SLEEP AND FATIGUE QUESTIONNAIRES
ESS TOTAL SCORE: 4
HOW LIKELY ARE YOU TO NOD OFF OR FALL ASLEEP WHILE SITTING AND READING: 1
HOW LIKELY ARE YOU TO NOD OFF OR FALL ASLEEP WHILE WATCHING TV: 1
HOW LIKELY ARE YOU TO NOD OFF OR FALL ASLEEP WHEN YOU ARE A PASSENGER IN A CAR FOR AN HOUR WITHOUT A BREAK: 1
HOW LIKELY ARE YOU TO NOD OFF OR FALL ASLEEP WHILE SITTING INACTIVE IN A PUBLIC PLACE: 1
NECK CIRCUMFERENCE (INCHES): 15
HOW LIKELY ARE YOU TO NOD OFF OR FALL ASLEEP IN A CAR, WHILE STOPPED FOR A FEW MINUTES IN TRAFFIC: 0
HOW LIKELY ARE YOU TO NOD OFF OR FALL ASLEEP WHILE SITTING QUIETLY AFTER LUNCH WITHOUT ALCOHOL: 0
HOW LIKELY ARE YOU TO NOD OFF OR FALL ASLEEP WHILE SITTING AND TALKING TO SOMEONE: 0
HOW LIKELY ARE YOU TO NOD OFF OR FALL ASLEEP WHILE LYING DOWN TO REST IN THE AFTERNOON WHEN CIRCUMSTANCES PERMIT: 0

## 2020-01-08 NOTE — PROGRESS NOTES
thinned but definable walls are unchanged from priors and had   a basilar prominence. These are more likely pneumatoceles rather   than cystic lung disease related to [facility] or [facility]. There are no thick   walled cavities or intracavitary fluid levels. There is mild   cylindrical bronchiectasis more pronounced in the lower lung   zones. In the dependent right lower lobe there is a small   subpleural region of articulation and groundglass opacity end   there is a smaller region of groundglass opacity in the dorsal   base of the left lower lobe which is nonspecific but maybe early,   small foci of UIP related to the patient's diagnosis of rheumatoid   arthritis. There are multiple small nodules measuring up to 5 mm which are   nonsignificantly changed. No new or enlarging nodules or   consolidation. There is no significant mediastinal adenopathy or   hilar enlargement. Small paratracheal lymph nodes are unchanged   from January 2012   No pleural abnormalities and there is no focal air trapping. There   are mild changes of central lobular emphysema in the upper lung   zones. PFT Jan 2012  FEV1 2.2 L 77% nl ratio TLC 84% DLCO 18.29 88%  PFT 4-22-13  FEV1 2.26 L 70%  TLC 92% DLCO 19.08 69%  PFT Mar 2014  FEV1 2.08 L 65%  TLC 5.63 88% DLCO 20.05 73%  PFT June 2014 FEV1 2.12 L 69%  TLC 6.14 L 99% DLCO 18.31 68%  PFT April 2016 FEV1 2.24 L FVC 3.2 L TLC 6.36 l DLCO 19.16 72%    Pertussis antibodies are positive    CT CHEST 5/20/19  Impression   1. Multiple new nonspecific ground-glass nodular opacities throughout both   lungs, with more amorphous ground-glass opacities noted within the anterior   right upper lobe.  These are most likely infectious or inflammatory in   etiology.  Suggest appropriate clinical treatment, and short-term chest CT   follow-up in 6-8 weeks to ensure resolution of these multifocal nodular   opacities.    2. Mild subpleural atelectasis within the bilateral lower lobes, without   acute airspace consolidation. 3. Mild chronic bibasilar pleural and parenchymal scarring. 4. Multiple stable chronic pneumatoceles within the bilateral lung bases. CPAP titration 8/20/19 CPAP 7  PSG 7/17/19 AHI 8, but AHI 46 with REM      Assessment:      · COPD/Chronic bronchitis/cough with cylindrical bronchiectasis, now with acute exacerbation  · Mild DANIELLE/REM related sleep disordered breathing  · Ground glass Pulmonary Nodules   · Tracheomalacia    · Lower extremity edema, markedly better after 3 days metolazone. · Acute kidney failure - better with diuresis   · Rheumatoid arthritis on Embrel and MTX and 4 mg prednisone  · Pulmonary Nodule 8 mm left lower lobe, has decreased in size on serial imaging  · Positive tobacco history, 20 pack year quit in 1991  · Diabetes      Plan:      · Continue azithromycin daily  · Continue metolazone 5 mg only on days with weight greater than 200#  · Continue Daliresp; off Singulair  -- Failed Singulair, Failed Dulera, Failed Spiriva, Failed Advair. · Continue CPAP, empiric trial increase to 8 to see if it helps with the cough related to tracheobronchomalacia. May need additional increases in pressure.   · CT CHEST to f/u ground glass nodules in 3-4 weeks, can call for result  · Mask Fitting with [employer]  · See me in 3-4 months        Vi Valdez

## 2020-01-16 ENCOUNTER — TELEPHONE (OUTPATIENT)
Dept: PULMONOLOGY | Age: 70
End: 2020-01-16

## 2020-01-16 NOTE — TELEPHONE ENCOUNTER
Patient called states that she went back to the dentist he states that her jaw is broken again so she will not be able to use her CPAP. She is to hopefully get a implant next week. She wanted Dr Soren Christensen to know to make sure he didn't have anything different she needed to do?    1/8/20    Assessment:   · COPD/Chronic bronchitis/cough with cylindrical bronchiectasis, now with acute exacerbation  · Mild DANIELLE/REM related sleep disordered breathing  · Ground glass Pulmonary Nodules   · Tracheomalacia    · Lower extremity edema, markedly better after 3 days metolazone. · Acute kidney failure - better with diuresis   · Rheumatoid arthritis on Embrel and MTX and 4 mg prednisone  · Pulmonary Nodule 8 mm left lower lobe, has decreased in size on serial imaging  · Positive tobacco history, 20 pack year quit in 1991  · Diabetes                Plan:   · Continue azithromycin daily  · Continue metolazone 5 mg only on days with weight greater than 200#  · Continue Daliresp; off Singulair  -- Failed Singulair, Failed Dulera, Failed Spiriva, Failed Advair. · Continue CPAP, empiric trial increase to 8 to see if it helps with the cough related to tracheobronchomalacia. May need additional increases in pressure.   · CT CHEST to f/u ground glass nodules in 3-4 weeks, can call for result  · Mask Fitting with [employer]  · See me in 3-4 months                Vi Valdez

## 2020-01-20 RX ORDER — ALBUTEROL SULFATE 90 UG/1
2 AEROSOL, METERED RESPIRATORY (INHALATION) EVERY 4 HOURS PRN
Qty: 1 INHALER | Refills: 5 | Status: SHIPPED | OUTPATIENT
Start: 2020-01-20 | End: 2022-08-08 | Stop reason: SDUPTHER

## 2020-01-31 ENCOUNTER — TELEPHONE (OUTPATIENT)
Dept: PULMONOLOGY | Age: 70
End: 2020-01-31

## 2020-01-31 NOTE — TELEPHONE ENCOUNTER
Patient called stating that she cancelled CT scan due to rattling in her chest.  Ct r/s for 2/25/20. Watch for results. Assessment:1/8/20   · COPD/Chronic bronchitis/cough with cylindrical bronchiectasis, now with acute exacerbation  · Mild DANIELLE/REM related sleep disordered breathing  · Ground glass Pulmonary Nodules   · Tracheomalacia    · Lower extremity edema, markedly better after 3 days metolazone. · Acute kidney failure - better with diuresis   · Rheumatoid arthritis on Embrel and MTX and 4 mg prednisone  · Pulmonary Nodule 8 mm left lower lobe, has decreased in size on serial imaging  · Positive tobacco history, 20 pack year quit in 1991  · Diabetes                Plan:   · Continue azithromycin daily  · Continue metolazone 5 mg only on days with weight greater than 200#  · Continue Daliresp; off Singulair  -- Failed Singulair, Failed Dulera, Failed Spiriva, Failed Advair. · Continue CPAP, empiric trial increase to 8 to see if it helps with the cough related to tracheobronchomalacia. May need additional increases in pressure.   · CT CHEST to f/u ground glass nodules in 3-4 weeks, can call for result  · Mask Fitting with [employer]  · See me in 3-4 months                Dorothea Burroughs

## 2020-02-07 ENCOUNTER — TELEPHONE (OUTPATIENT)
Dept: PULMONOLOGY | Age: 70
End: 2020-02-07

## 2020-02-07 NOTE — TELEPHONE ENCOUNTER
Kathryn Tinoco NP at [address] [facility] preop dept called asking for an appt for pt. Pt needs to have dental extraction for hardware removal and pt needs pulm clearance prior to proceeding. Please advise where pt can be seen? Marileejordan Herman would like office to call her back directly to schedule pt for appt [phone]. OV 1/8/20:    Assessment:   · COPD/Chronic bronchitis/cough with cylindrical bronchiectasis, now with acute exacerbation  · Mild DANIELLE/REM related sleep disordered breathing  · Ground glass Pulmonary Nodules   · Tracheomalacia    · Lower extremity edema, markedly better after 3 days metolazone. · Acute kidney failure - better with diuresis   · Rheumatoid arthritis on Embrel and MTX and 4 mg prednisone  · Pulmonary Nodule 8 mm left lower lobe, has decreased in size on serial imaging  · Positive tobacco history, 20 pack year quit in 1991  · Diabetes                Plan:   · Continue azithromycin daily  · Continue metolazone 5 mg only on days with weight greater than 200#  · Continue Daliresp; off Singulair  -- Failed Singulair, Failed Dulera, Failed Spiriva, Failed Advair. · Continue CPAP, empiric trial increase to 8 to see if it helps with the cough related to tracheobronchomalacia. May need additional increases in pressure.   · CT CHEST to f/u ground glass nodules in 3-4 weeks, can call for result  · Mask Fitting with [employer]  · See me in 3-4 months                [facility] Ivonnemelida Valdez

## 2020-02-10 ENCOUNTER — OFFICE VISIT (OUTPATIENT)
Dept: PULMONOLOGY | Age: 70
End: 2020-02-10
Payer: MEDICARE

## 2020-02-10 VITALS
HEART RATE: 75 BPM | HEIGHT: 71 IN | TEMPERATURE: 97.8 F | SYSTOLIC BLOOD PRESSURE: 110 MMHG | OXYGEN SATURATION: 97 % | DIASTOLIC BLOOD PRESSURE: 60 MMHG | WEIGHT: 195 LBS | BODY MASS INDEX: 27.3 KG/M2

## 2020-02-10 PROCEDURE — 99214 OFFICE O/P EST MOD 30 MIN: CPT | Performed by: INTERNAL MEDICINE

## 2020-02-10 NOTE — PROGRESS NOTES
CC: cough  HPI    Interval History:    DANIELLE - cannot tolerate CPAPdue to broken jaw, waiting on mask strap. CPAP at night helps with cough when using. DANIELLE is mild. Recently had more cough and congestion, is now better. Back to baseline    PREVIOUS HX  61 yo with 2 month h/o severe waxing and waning cough, treated with avelox without improvement and then changed to Z pac, some improvement; then treated with prednisone and Z pac and then clearly better, then returned and retreated with cough medicine and prednisone. No cough prior to 3 months ago except with sinus drainage. Cough has slowly been improving since last visit here. Objective:   Physical Exam  Blood pressure 110/60, pulse 75, temperature 97.8 °F (36.6 °C), height 5' 10.5\" (1.791 m), weight 195 lb (88.5 kg), SpO2 97 %. 'on RA  Constitutional:  No acute distress. HENT:  Oropharynx is clear and moist.  Class IV  Eyes: Conjunctivae are normal. Pupils equal, round, and reactive to light. No scleral icterus. Neck: No tracheal deviation present. No obvious thyromegaly. 15 inches  Cardiovascular: Normal heart sounds. No right ventricular heave. ++ lower extremity edema  Pulmonary/Chest: diffuse wheezes. No rhonchi. LLL rales. No decreased breath sounds. No accessory muscle usage or stridor. Abdominal: Soft. Musculoskeletal: No cyanosis. No clubbing. Lymphadenopathy: No cervical or supraclavicular adenopathy. Skin: Skin is warm and dry. Psychiatric: Normal mood and affect. CXR 6/3/18  Possible bilataral LL pneumonia v atelectasis    HRCT 2014  Multiple parenchymal pulmonary cysts measuring up to 3 centimeters   with thinned but definable walls are unchanged from priors and had   a basilar prominence. These are more likely pneumatoceles rather   than cystic lung disease related to [facility] or [facility]. There are no thick   walled cavities or intracavitary fluid levels.  There is mild   cylindrical bronchiectasis more pronounced in the lower

## 2020-02-11 ENCOUNTER — TELEPHONE (OUTPATIENT)
Dept: PULMONOLOGY | Age: 70
End: 2020-02-11

## 2020-02-25 RX ORDER — IPRATROPIUM BROMIDE 42 UG/1
SPRAY, METERED NASAL
Qty: 1 BOTTLE | Refills: 5 | Status: SHIPPED | OUTPATIENT
Start: 2020-02-25 | End: 2020-10-29

## 2020-03-23 ENCOUNTER — TELEPHONE (OUTPATIENT)
Dept: PULMONOLOGY | Age: 70
End: 2020-03-23

## 2020-03-24 NOTE — TELEPHONE ENCOUNTER
Patient is scheduled for ct scan 5/18/20 and follow up with  5/19/20 per request.     Canceled appointment 4/15/20. Patient has cancelled appointment based on the CDC recommended COVID-19 pandemic precautions.

## 2020-03-24 NOTE — TELEPHONE ENCOUNTER
Corona virus has been declared a national emergency. Under these unprecedented circumstances, many important but not emergent tests will need to be rescheduled. This is necessary to help patients avoid the real risks of exposure in the hospital setting. The risk of waiting must be balanced against the imminent threat of COVID-19. Reschedule patient out 8 weeks. Please make a note to contact patient in 8 weeks if our office has not been contacted to reschedule.

## 2020-03-24 NOTE — TELEPHONE ENCOUNTER
Spoke to patient declined to schedule ct scan at this time due to  [facility] recommended COVID-19 pandemic precautions. Patient is currently scheduled with  4/15/20. Please advise if ct scan and follow up can be reschedule out.

## 2020-04-02 RX ORDER — AZITHROMYCIN 250 MG/1
TABLET, FILM COATED ORAL
Qty: 30 TABLET | Refills: 5 | Status: SHIPPED | OUTPATIENT
Start: 2020-04-02 | End: 2020-10-19

## 2020-05-13 ENCOUNTER — TELEPHONE (OUTPATIENT)
Dept: PULMONOLOGY | Age: 70
End: 2020-05-13

## 2020-05-13 NOTE — TELEPHONE ENCOUNTER
Within this Telehealth Consent, the terms you and yours refer to the person using the Telehealth Service (Service), or in the case of a use of the Service by or on behalf of a minor, you and yours refer to and include (i) the parent or legal guardian who provides consent to the use of the Service by such minor or uses the Service on behalf of such minor, and (ii) the minor for whom consent is being provided or on whose behalf the Service is being utilized. When using Service, you will be consulting with your health care providers via the use of Telehealth.   Telehealth involves the delivery of healthcare services using electronic communications, information technology or other means between a healthcare provider and a patient who are not in the same physical location. Telehealth may be used for diagnosis, treatment, follow-up and/or patient education, and may include, but is not limited to, one or more of the following:    Electronic transmission of medical records, photo images, personal health information or other data between a patient and a healthcare provider    Interactions between a patient and healthcare provider via audio, video and/or data communications    Use of output data from medical devices, sound and video files    Anticipated Benefits   The use of Telehealth by your Provider(s) through the Service may have the following possible benefits:    Making it easier and more efficient for you to access medical care and treatment for the conditions treated by such Provider(s) utilizing the Service    Allowing you to obtain medical care and treatment by Provider(s) at times that are convenient for you    Enabling you to interact with Provider(s) without the necessity of an in-office appointment     Possible Risks   While the use of Telehealth can provide potential benefits for you, there are also potential risks associated with the use of Telehealth.  These risks include, but may not be limited to the following:    Your Provider(s) may not able to provide medical treatment for your particular condition and you may be required to seek alternative healthcare or emergency care services.  The electronic systems or other security protocols or safeguards used in the Service could fail, causing a breach of privacy of your medical or other information.  Given regulatory requirements in certain jurisdictions, your Provider(s) diagnosis and/or treatment options, especially pertaining to certain prescriptions, may be limited. Acceptance   1. You understand that Services will be provided via Telehealth. This process involves the use of HIPAA compliant and secure, real-time audio-visual interfacing with a qualified and appropriately trained provider located at [facility]. 2. You understand that, under no circumstances, will this session be recorded. 3. You understand that the Provider(s) at [facility] and other clinical participants will be party to the information obtained during the Telehealth session in accordance with best medical practices. 4. You understand that the information obtained during the Telehealth session will be used to help determine the most appropriate treatment options. 5. You understand that You have the right to revoke this consent at any point in time. 6. You understand that Telehealth is voluntary, and that continued treatment is not dependent upon consent. 7. You understand that, in the event of non-consent to Telehealth services and/or technical difficulties, you will obtain services as typically provided in the absence of Telehealth technology. 8. You understand that this consent will be kept in Your medical record. 9. No potential benefits from the use of Telehealth or specific results can be guaranteed. Your condition may not be cured or improved and, in some cases, may get worse.    10. There are limitations in the provision of medical care and treatment via Telehealth and the Service and you may not be able to receive diagnosis and/or treatment through the Service for every condition for which you seek diagnosis and/or treatment. 11. There are potential risks to the use of Telehealth, including but not limited to the risks described in this Telehealth Consent. 12. Your Provider(s) have discussed the use of Telehealth and the Service with you, including the benefits and risks of such and you have provided oral consent to your Provider(s) for the use of Telehealth and the Service. 15. You understand that it is your duty to provide your Provider(s) truthful, accurate and complete information, including all relevant information regarding care that you may have received or may be receiving from other healthcare providers outside of the Service. 14. You understand that each of your Provider(s) may determine in his or sole discretion that your condition is not suitable for diagnosis and/or treatment using the Service, and that you may need to seek medical care and treatment a specialist or other healthcare provider, outside of the Service. 15. You understand that you are fully responsible for payment for all services provided by Provider(s) or through use of the Service and that you may not be able to use third-party insurance. 16. You represent that (a) you have read this Telehealth Consent carefully, (b) you understand the risks and benefits of the Service and the use of Telehealth in the medical care and treatment provided to you by Provider(s) using the Service, and (c) you have the legal capacity and authority to provide this consent for yourself and/or the minor for which you are consenting under applicable federal and state laws, including laws relating to the age of [de-identified] and/or parental/guardian consent.    17. You give your informed consent to the use of Telehealth by Provider(s) using the Service under

## 2020-05-18 ENCOUNTER — HOSPITAL ENCOUNTER (OUTPATIENT)
Dept: CT IMAGING | Age: 70
Discharge: HOME | End: 2020-05-18
Payer: MEDICARE

## 2020-05-18 PROCEDURE — 71250 CT THORAX DX C-: CPT

## 2020-05-19 ENCOUNTER — VIRTUAL VISIT (OUTPATIENT)
Dept: PULMONOLOGY | Age: 70
End: 2020-05-19
Payer: MEDICARE

## 2020-05-19 VITALS — WEIGHT: 193 LBS | BODY MASS INDEX: 27.02 KG/M2 | HEIGHT: 71 IN

## 2020-05-19 PROCEDURE — 99214 OFFICE O/P EST MOD 30 MIN: CPT | Performed by: INTERNAL MEDICINE

## 2020-05-19 ASSESSMENT — SLEEP AND FATIGUE QUESTIONNAIRES
HOW LIKELY ARE YOU TO NOD OFF OR FALL ASLEEP WHILE SITTING QUIETLY AFTER LUNCH WITHOUT ALCOHOL: 0
HOW LIKELY ARE YOU TO NOD OFF OR FALL ASLEEP WHILE WATCHING TV: 0
HOW LIKELY ARE YOU TO NOD OFF OR FALL ASLEEP WHILE SITTING INACTIVE IN A PUBLIC PLACE: 0
HOW LIKELY ARE YOU TO NOD OFF OR FALL ASLEEP WHILE SITTING AND TALKING TO SOMEONE: 0
HOW LIKELY ARE YOU TO NOD OFF OR FALL ASLEEP WHEN YOU ARE A PASSENGER IN A CAR FOR AN HOUR WITHOUT A BREAK: 0
ESS TOTAL SCORE: 2
HOW LIKELY ARE YOU TO NOD OFF OR FALL ASLEEP IN A CAR, WHILE STOPPED FOR A FEW MINUTES IN TRAFFIC: 0
HOW LIKELY ARE YOU TO NOD OFF OR FALL ASLEEP WHILE LYING DOWN TO REST IN THE AFTERNOON WHEN CIRCUMSTANCES PERMIT: 0
HOW LIKELY ARE YOU TO NOD OFF OR FALL ASLEEP WHILE SITTING AND READING: 2

## 2020-05-19 NOTE — PROGRESS NOTES
CC: cough  HPI    Interval History:    DANIELLE - has resumed CPAP post jaw surgery and feels she is doing well, uses regularly, has requested supplies from [facility] [place]    More cough since December, still on azithromycin, still on daliresp. Does not feel she needs prednisone now. PREVIOUS HX  63 yo with 2 month h/o severe waxing and waning cough, treated with avelox without improvement and then changed to Z pac, some improvement; then treated with prednisone and Z pac and then clearly better, then returned and retreated with cough medicine and prednisone. No cough prior to 3 months ago except with sinus drainage. Cough has slowly been improving since last visit here. Objective:   Physical Exam  Height 5' 10.5\" (1.791 m), weight 193 lb (87.5 kg). 'on RA  Constitutional:  No acute distress. Appears well developed and nourished. Eyes: EOM intact. Conjunctivae anicteric. No visible discharge. HENT: Head is normocephalic and atraumatic. Mucus membranes are moist and the tongue appears normal. Normal appearing nose. External Ears normal. Neck with no obvious mass and the trachea is midline. Respiratory: No accessory muscle usage. Respiratory effort normal. No visualized signs of difficulty breathing or respiratory distress  Cardiovascular: No obvious peripheral edema. Skin: No significant exanthematous lesions or discoloration noted on facial skin   Psychiatric: No anxiety or Agitation. Alert and Oriented to person, place and time. Normal judgement and insight.      PFT Jan 2012  FEV1 2.2 L 77% nl ratio TLC 84% DLCO 18.29 88%  PFT 4-22-13  FEV1 2.26 L 70%  TLC 92% DLCO 19.08 69%  PFT Mar 2014  FEV1 2.08 L 65%  TLC 5.63 88% DLCO 20.05 73%  PFT June 2014 FEV1 2.12 L 69%  TLC 6.14 L 99% DLCO 18.31 68%  PFT April 2016 FEV1 2.24 L FVC 3.2 L TLC 6.36 l DLCO 19.16 72%    Pertussis antibodies are positive    CT CHEST 5/19/20     Impression   Resolution of the previous pulmonary nodules.  No suspicious nodules on this in her home, provider was located in his office. --Mary Pang MD on 5/19/2020 at 2:29 PM    An electronic signature was used to authenticate this note.

## 2020-06-28 ENCOUNTER — APPOINTMENT (OUTPATIENT)
Dept: CT IMAGING | Age: 70
DRG: 682 | End: 2020-06-28
Payer: MEDICARE

## 2020-06-28 ENCOUNTER — HOSPITAL ENCOUNTER (INPATIENT)
Age: 70
LOS: 3 days | Discharge: HOME | DRG: 682 | End: 2020-07-01
Attending: EMERGENCY MEDICINE | Admitting: INTERNAL MEDICINE
Payer: MEDICARE

## 2020-06-28 ENCOUNTER — APPOINTMENT (OUTPATIENT)
Dept: GENERAL RADIOLOGY | Age: 70
DRG: 682 | End: 2020-06-28
Payer: MEDICARE

## 2020-06-28 PROBLEM — G93.40 ACUTE ENCEPHALOPATHY: Status: ACTIVE | Noted: 2020-06-28

## 2020-06-28 LAB
A/G RATIO: 1 (ref 1.1–2.2)
ALBUMIN SERPL-MCNC: 3.3 G/DL (ref 3.4–5)
ALP BLD-CCNC: 191 U/L (ref 40–129)
ALT SERPL-CCNC: 11 U/L (ref 10–40)
ANION GAP SERPL CALCULATED.3IONS-SCNC: 13 MMOL/L (ref 3–16)
APTT: 25.4 SEC (ref 24.2–36.2)
AST SERPL-CCNC: 17 U/L (ref 15–37)
BACTERIA: ABNORMAL /HPF
BASE EXCESS VENOUS: 2.4 MMOL/L (ref -3–3)
BASOPHILS ABSOLUTE: 0.2 K/UL (ref 0–0.2)
BASOPHILS RELATIVE PERCENT: 1.4 %
BILIRUB SERPL-MCNC: 0.7 MG/DL (ref 0–1)
BILIRUBIN URINE: NEGATIVE
BLOOD, URINE: ABNORMAL
BUN BLDV-MCNC: 30 MG/DL (ref 7–20)
CALCIUM SERPL-MCNC: 8.5 MG/DL (ref 8.3–10.6)
CARBOXYHEMOGLOBIN: 3.3 % (ref 0–1.5)
CHLORIDE BLD-SCNC: 85 MMOL/L (ref 99–110)
CHP ED QC CHECK: YES
CLARITY: CLEAR
CO2: 28 MMOL/L (ref 21–32)
COLOR: YELLOW
CREAT SERPL-MCNC: 1.5 MG/DL (ref 0.6–1.2)
EOSINOPHILS ABSOLUTE: 0.2 K/UL (ref 0–0.6)
EOSINOPHILS RELATIVE PERCENT: 1.6 %
EPITHELIAL CELLS, UA: ABNORMAL /HPF (ref 0–5)
FIBRINOGEN: 371 MG/DL (ref 200–397)
GFR AFRICAN AMERICAN: 42
GFR NON-AFRICAN AMERICAN: 34
GLOBULIN: 3.3 G/DL
GLUCOSE BLD-MCNC: 247 MG/DL (ref 70–99)
GLUCOSE BLD-MCNC: 333 MG/DL
GLUCOSE BLD-MCNC: 333 MG/DL (ref 70–99)
GLUCOSE BLD-MCNC: 395 MG/DL (ref 70–99)
GLUCOSE URINE: >=1000 MG/DL
HCO3 VENOUS: 28.5 MMOL/L (ref 23–29)
HCT VFR BLD CALC: 37.7 % (ref 36–48)
HEMOGLOBIN: 12.5 G/DL (ref 12–16)
INR BLD: 1.08 (ref 0.86–1.14)
KETONES, URINE: NEGATIVE MG/DL
LACTATE DEHYDROGENASE: 85 U/L (ref 100–190)
LACTIC ACID: 2.2 MMOL/L (ref 0.4–2)
LEUKOCYTE ESTERASE, URINE: NEGATIVE
LYMPHOCYTES ABSOLUTE: 1.7 K/UL (ref 1–5.1)
LYMPHOCYTES RELATIVE PERCENT: 15.1 %
MCH RBC QN AUTO: 30.9 PG (ref 26–34)
MCHC RBC AUTO-ENTMCNC: 33.1 G/DL (ref 31–36)
MCV RBC AUTO: 93.3 FL (ref 80–100)
METHEMOGLOBIN VENOUS: 0.3 %
MICROSCOPIC EXAMINATION: YES
MONOCYTES ABSOLUTE: 0.7 K/UL (ref 0–1.3)
MONOCYTES RELATIVE PERCENT: 6.8 %
NEUTROPHILS ABSOLUTE: 8.3 K/UL (ref 1.7–7.7)
NEUTROPHILS RELATIVE PERCENT: 75.1 %
NITRITE, URINE: NEGATIVE
O2 CONTENT, VEN: 10 VOL %
O2 SAT, VEN: 60 %
O2 THERAPY: ABNORMAL
PCO2, VEN: 50.4 MMHG (ref 40–50)
PDW BLD-RTO: 16.5 % (ref 12.4–15.4)
PERFORMED ON: ABNORMAL
PERFORMED ON: ABNORMAL
PH UA: 6.5 (ref 5–8)
PH VENOUS: 7.37 (ref 7.35–7.45)
PLATELET # BLD: 213 K/UL (ref 135–450)
PMV BLD AUTO: 8.9 FL (ref 5–10.5)
PO2, VEN: 32.6 MMHG (ref 25–40)
POTASSIUM REFLEX MAGNESIUM: 3.8 MMOL/L (ref 3.5–5.1)
PRO-BNP: 510 PG/ML (ref 0–124)
PROTEIN UA: ABNORMAL MG/DL
PROTHROMBIN TIME: 12.5 SEC (ref 10–13.2)
RBC # BLD: 4.04 M/UL (ref 4–5.2)
RBC UA: ABNORMAL /HPF (ref 0–4)
SODIUM BLD-SCNC: 126 MMOL/L (ref 136–145)
SPECIFIC GRAVITY UA: 1.01 (ref 1–1.03)
TCO2 CALC VENOUS: 30 MMOL/L
TOTAL PROTEIN: 6.6 G/DL (ref 6.4–8.2)
TSH REFLEX: 1.17 UIU/ML (ref 0.27–4.2)
URINE REFLEX TO CULTURE: ABNORMAL
URINE TYPE: ABNORMAL
UROBILINOGEN, URINE: 0.2 E.U./DL
WBC # BLD: 11 K/UL (ref 4–11)
WBC UA: ABNORMAL /HPF (ref 0–5)

## 2020-06-28 PROCEDURE — 85025 COMPLETE CBC W/AUTO DIFF WBC: CPT

## 2020-06-28 PROCEDURE — 36415 COLL VENOUS BLD VENIPUNCTURE: CPT

## 2020-06-28 PROCEDURE — 85730 THROMBOPLASTIN TIME PARTIAL: CPT

## 2020-06-28 PROCEDURE — 83605 ASSAY OF LACTIC ACID: CPT

## 2020-06-28 PROCEDURE — 96360 HYDRATION IV INFUSION INIT: CPT

## 2020-06-28 PROCEDURE — 83880 ASSAY OF NATRIURETIC PEPTIDE: CPT

## 2020-06-28 PROCEDURE — 83615 LACTATE (LD) (LDH) ENZYME: CPT

## 2020-06-28 PROCEDURE — 6370000000 HC RX 637 (ALT 250 FOR IP): Performed by: INTERNAL MEDICINE

## 2020-06-28 PROCEDURE — 99285 EMERGENCY DEPT VISIT HI MDM: CPT

## 2020-06-28 PROCEDURE — U0003 INFECTIOUS AGENT DETECTION BY NUCLEIC ACID (DNA OR RNA); SEVERE ACUTE RESPIRATORY SYNDROME CORONAVIRUS 2 (SARS-COV-2) (CORONAVIRUS DISEASE [COVID-19]), AMPLIFIED PROBE TECHNIQUE, MAKING USE OF HIGH THROUGHPUT TECHNOLOGIES AS DESCRIBED BY CMS-2020-01-R: HCPCS

## 2020-06-28 PROCEDURE — G0378 HOSPITAL OBSERVATION PER HR: HCPCS

## 2020-06-28 PROCEDURE — 82728 ASSAY OF FERRITIN: CPT

## 2020-06-28 PROCEDURE — 6360000002 HC RX W HCPCS: Performed by: INTERNAL MEDICINE

## 2020-06-28 PROCEDURE — 96374 THER/PROPH/DIAG INJ IV PUSH: CPT

## 2020-06-28 PROCEDURE — 82803 BLOOD GASES ANY COMBINATION: CPT

## 2020-06-28 PROCEDURE — 83036 HEMOGLOBIN GLYCOSYLATED A1C: CPT

## 2020-06-28 PROCEDURE — 70450 CT HEAD/BRAIN W/O DYE: CPT

## 2020-06-28 PROCEDURE — 85384 FIBRINOGEN ACTIVITY: CPT

## 2020-06-28 PROCEDURE — 93005 ELECTROCARDIOGRAM TRACING: CPT | Performed by: EMERGENCY MEDICINE

## 2020-06-28 PROCEDURE — 71045 X-RAY EXAM CHEST 1 VIEW: CPT

## 2020-06-28 PROCEDURE — 81001 URINALYSIS AUTO W/SCOPE: CPT

## 2020-06-28 PROCEDURE — 2580000003 HC RX 258: Performed by: EMERGENCY MEDICINE

## 2020-06-28 PROCEDURE — 1200000000 HC SEMI PRIVATE

## 2020-06-28 PROCEDURE — 85610 PROTHROMBIN TIME: CPT

## 2020-06-28 PROCEDURE — 84443 ASSAY THYROID STIM HORMONE: CPT

## 2020-06-28 PROCEDURE — 80053 COMPREHEN METABOLIC PANEL: CPT

## 2020-06-28 PROCEDURE — 96361 HYDRATE IV INFUSION ADD-ON: CPT

## 2020-06-28 PROCEDURE — 2580000003 HC RX 258: Performed by: INTERNAL MEDICINE

## 2020-06-28 RX ORDER — SODIUM CHLORIDE 0.9 % (FLUSH) 0.9 %
10 SYRINGE (ML) INJECTION EVERY 12 HOURS SCHEDULED
Status: DISCONTINUED | OUTPATIENT
Start: 2020-06-28 | End: 2020-07-01 | Stop reason: HOSPADM

## 2020-06-28 RX ORDER — POLYETHYLENE GLYCOL 3350 17 G/17G
17 POWDER, FOR SOLUTION ORAL DAILY PRN
Status: DISCONTINUED | OUTPATIENT
Start: 2020-06-28 | End: 2020-07-01 | Stop reason: HOSPADM

## 2020-06-28 RX ORDER — INSULIN GLARGINE 100 [IU]/ML
25 INJECTION, SOLUTION SUBCUTANEOUS NIGHTLY
Status: DISCONTINUED | OUTPATIENT
Start: 2020-06-28 | End: 2020-06-30

## 2020-06-28 RX ORDER — ALBUTEROL SULFATE 90 UG/1
2 AEROSOL, METERED RESPIRATORY (INHALATION) EVERY 4 HOURS PRN
Status: DISCONTINUED | OUTPATIENT
Start: 2020-06-28 | End: 2020-07-01 | Stop reason: HOSPADM

## 2020-06-28 RX ORDER — ACETAMINOPHEN 325 MG/1
650 TABLET ORAL EVERY 6 HOURS PRN
Status: DISCONTINUED | OUTPATIENT
Start: 2020-06-28 | End: 2020-06-29

## 2020-06-28 RX ORDER — ACETAMINOPHEN 650 MG/1
650 SUPPOSITORY RECTAL EVERY 6 HOURS PRN
Status: DISCONTINUED | OUTPATIENT
Start: 2020-06-28 | End: 2020-06-29

## 2020-06-28 RX ORDER — ACETAMINOPHEN 650 MG/1
650 SUPPOSITORY RECTAL EVERY 6 HOURS PRN
Status: DISCONTINUED | OUTPATIENT
Start: 2020-06-28 | End: 2020-06-28

## 2020-06-28 RX ORDER — ATORVASTATIN CALCIUM 40 MG/1
40 TABLET, FILM COATED ORAL NIGHTLY
Status: DISCONTINUED | OUTPATIENT
Start: 2020-06-28 | End: 2020-07-01 | Stop reason: HOSPADM

## 2020-06-28 RX ORDER — MAGNESIUM SULFATE IN WATER 40 MG/ML
2 INJECTION, SOLUTION INTRAVENOUS PRN
Status: DISCONTINUED | OUTPATIENT
Start: 2020-06-28 | End: 2020-07-01 | Stop reason: HOSPADM

## 2020-06-28 RX ORDER — FOLIC ACID 1 MG/1
1 TABLET ORAL DAILY
Status: DISCONTINUED | OUTPATIENT
Start: 2020-06-29 | End: 2020-07-01 | Stop reason: HOSPADM

## 2020-06-28 RX ORDER — AZITHROMYCIN 250 MG/1
250 TABLET, FILM COATED ORAL DAILY
Status: DISCONTINUED | OUTPATIENT
Start: 2020-06-29 | End: 2020-07-01 | Stop reason: HOSPADM

## 2020-06-28 RX ORDER — SODIUM CHLORIDE 0.9 % (FLUSH) 0.9 %
10 SYRINGE (ML) INJECTION PRN
Status: DISCONTINUED | OUTPATIENT
Start: 2020-06-28 | End: 2020-07-01 | Stop reason: HOSPADM

## 2020-06-28 RX ORDER — ONDANSETRON 2 MG/ML
4 INJECTION INTRAMUSCULAR; INTRAVENOUS EVERY 6 HOURS PRN
Status: DISCONTINUED | OUTPATIENT
Start: 2020-06-28 | End: 2020-07-01 | Stop reason: HOSPADM

## 2020-06-28 RX ORDER — PANTOPRAZOLE SODIUM 40 MG/1
40 TABLET, DELAYED RELEASE ORAL
Status: DISCONTINUED | OUTPATIENT
Start: 2020-06-29 | End: 2020-07-01 | Stop reason: HOSPADM

## 2020-06-28 RX ORDER — ASPIRIN 81 MG/1
81 TABLET ORAL DAILY
Status: DISCONTINUED | OUTPATIENT
Start: 2020-06-29 | End: 2020-07-01 | Stop reason: HOSPADM

## 2020-06-28 RX ORDER — POTASSIUM CHLORIDE 7.45 MG/ML
10 INJECTION INTRAVENOUS PRN
Status: DISCONTINUED | OUTPATIENT
Start: 2020-06-28 | End: 2020-07-01 | Stop reason: HOSPADM

## 2020-06-28 RX ORDER — NICOTINE POLACRILEX 4 MG
15 LOZENGE BUCCAL PRN
Status: DISCONTINUED | OUTPATIENT
Start: 2020-06-28 | End: 2020-07-01 | Stop reason: HOSPADM

## 2020-06-28 RX ORDER — 0.9 % SODIUM CHLORIDE 0.9 %
1000 INTRAVENOUS SOLUTION INTRAVENOUS ONCE
Status: COMPLETED | OUTPATIENT
Start: 2020-06-28 | End: 2020-06-28

## 2020-06-28 RX ORDER — SODIUM CHLORIDE 9 MG/ML
INJECTION, SOLUTION INTRAVENOUS CONTINUOUS
Status: DISCONTINUED | OUTPATIENT
Start: 2020-06-28 | End: 2020-07-01

## 2020-06-28 RX ORDER — IPRATROPIUM BROMIDE 42 UG/1
2 SPRAY, METERED NASAL 3 TIMES DAILY
Status: DISCONTINUED | OUTPATIENT
Start: 2020-06-28 | End: 2020-07-01 | Stop reason: HOSPADM

## 2020-06-28 RX ORDER — METOPROLOL SUCCINATE 25 MG/1
25 TABLET, EXTENDED RELEASE ORAL DAILY
Status: DISCONTINUED | OUTPATIENT
Start: 2020-06-29 | End: 2020-07-01 | Stop reason: HOSPADM

## 2020-06-28 RX ORDER — DEXTROSE MONOHYDRATE 50 MG/ML
100 INJECTION, SOLUTION INTRAVENOUS PRN
Status: DISCONTINUED | OUTPATIENT
Start: 2020-06-28 | End: 2020-07-01 | Stop reason: HOSPADM

## 2020-06-28 RX ORDER — DEXTROSE MONOHYDRATE 25 G/50ML
12.5 INJECTION, SOLUTION INTRAVENOUS PRN
Status: DISCONTINUED | OUTPATIENT
Start: 2020-06-28 | End: 2020-07-01 | Stop reason: HOSPADM

## 2020-06-28 RX ADMIN — IPRATROPIUM BROMIDE 2 SPRAY: 42 SPRAY NASAL at 23:12

## 2020-06-28 RX ADMIN — SODIUM CHLORIDE: 9 INJECTION, SOLUTION INTRAVENOUS at 23:10

## 2020-06-28 RX ADMIN — Medication 10 ML: at 23:12

## 2020-06-28 RX ADMIN — INSULIN GLARGINE 25 UNITS: 100 INJECTION, SOLUTION SUBCUTANEOUS at 23:13

## 2020-06-28 RX ADMIN — SODIUM CHLORIDE 1000 ML: 9 INJECTION, SOLUTION INTRAVENOUS at 16:24

## 2020-06-28 RX ADMIN — ONDANSETRON HYDROCHLORIDE 4 MG: 2 INJECTION, SOLUTION INTRAMUSCULAR; INTRAVENOUS at 23:12

## 2020-06-28 RX ADMIN — ATORVASTATIN CALCIUM 40 MG: 40 TABLET, FILM COATED ORAL at 23:11

## 2020-06-28 ASSESSMENT — PAIN DESCRIPTION - LOCATION: LOCATION: BACK

## 2020-06-28 ASSESSMENT — PAIN SCALES - GENERAL: PAINLEVEL_OUTOF10: 10

## 2020-06-28 ASSESSMENT — PAIN DESCRIPTION - PAIN TYPE: TYPE: CHRONIC PAIN

## 2020-06-28 NOTE — ED NOTES
Pt O2 sitting in the 80s; no change with mechanical interventions. RN placed pt on 2L NC. Pt's O2 now 100%. Changed cord again and implemented ear O2 sensor. Good pleth now and pt's O2 at 92-93% RA.       Stevie Holder RN  06/28/20 [facility], RN  06/28/20 [id number]

## 2020-06-28 NOTE — ED PROVIDER NOTES
[address] ED    Physician in Triage Note    I saw Ms. Melanie Monroe as physician in triage in order to expedite her care in the emergency department. The following is a brief history and physical to facilitate acquisition of rapid diagnostic imaging and therapeutics. Shanon De Leon is a 77 y.o. female who presents to the ED with confusion and lethargy. Symptoms started yesterday. Presents with daughter who provides history. Chronic cough, unchanged. Denies fever, chest pain, SOB. Nausea yesterday evening. Denies emesis, diarrhea, dysuria. Recent increase in lyrica. Also taking benadryl for itching. Percocet for chronic back pain. PHYSICAL EXAM  Vitals:    06/28/20 1429 06/28/20 1431 06/28/20 1442   BP:   (!) 122/54   Pulse:  95    Resp:  12    Temp:  99.8 °F (37.7 °C)    TempSrc:  Oral    Weight: 193 lb (87.5 kg)     Height: 5' 10\" (1.778 m)          Exam:   HEENT: Normocephalic atraumatic  Cardiac: Regular rate and rhythm  Lungs: No increased work of breathing, symmetric chest rise  Neuro: Patient with eyes closed, opens eyes to voice, oriented to person, place, time, situation. Moves all 4 extremities    LABS  I have reviewed all labs for this visit. Results for orders placed or performed during the hospital encounter of 06/28/20   POCT glucose   Result Value Ref Range    Glucose 333 mg/dL    QC OK? yes    POCT Glucose   Result Value Ref Range    POC Glucose 333 (H) 70 - 99 mg/dl    Performed on ACCU-CHEK        ECG  EKG interpreted by myself. Rate: normal  Rhythm: NSR  Axis: -35  Intervals: within normal limits  ST Segments: no acute abnormality  T waves: no acute abnormality  Comparison: Compared to 1/6/2019, there is slight left axis deviation  Impression: NSR with left axis deviation    MDM    Patient will require screening laboratory studies and further evaluation in the emergency department. Laboratory studies and EKG ordered. Refer to colleague's note for full H&P and MDM. Shelly Ashraf MD    Note: This chart was created using voice recognition dictation software. Efforts were made by me to ensure accuracy, however some errors may be present due to limitations of this technology and occasionally words are not transcribed correctly.         Shelly Ashraf MD  06/28/20 7696

## 2020-06-28 NOTE — ED NOTES
Straight cath patient for urine specimen, pt tolerated well. Daughter at bedside.      Rakesh Glover RN  06/28/20 0584

## 2020-06-28 NOTE — ED PROVIDER NOTES
CHIEF COMPLAINT  Fatigue (started yesterday, confusion per family)      HISTORY OF PRESENT ILLNESS  Ning Steiner is a 77 y.o. female presents to the ED with confusion and fatigue. The patient has seemed confused to her family since yesterday, she has had her Lyrica doubled in the past week secondary to postherpetic neuralgia, also has been taking Benadryl to help with itching of the former varicella lesions shingle lesions. She has had no fever, has not been eating quite as well, said no nausea or vomiting or headache. She has had no numbness, tingling or weakness in the arms of the legs. She has had no abdominal pain. She has chronic back pain for which she takes Percocet. No other complaints, modifying factors or associated symptoms. I have reviewed the following from the nursing documentation.     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     Past Surgical History:   Procedure Laterality Date    ARTERY SURGERY  5/30/13    left temporal artery biopsy    BRONCHOSCOPY      BRONCHOSCOPY N/A 6/12/2019    BRONCHOSCOPY ALVEOLAR LAVAGE performed by Adilene Toney MD at [address] [place] [address]      HERNIA REPAIR      KNEE ARTHROSCOPY      left    KYPHOSIS SURGERY      LAMINECTOMY      MANDIBLE FRACTURE SURGERY      MANDIBLE FRACTURE SURGERY  02/2020    SEPTOPLASTY  5/7/2013    FESS with balloon    SPINAL FUSION      TUBAL LIGATION      UPPER GASTROINTESTINAL ENDOSCOPY  4/8/2014    Dilitation     Family History   Problem Relation Age of Onset    Diabetes Mother     Hypertension Mother     Asthma Other     Heart Disease Brother     Diabetes Brother     Diabetes Sister     Heart Disease Brother     Cancer Neg Hx     Emphysema Neg Hx     Heart Failure Neg Hx      Social History     Socioeconomic History    Marital status:       Spouse name: Not on file    Number of children: Not on file    Years of education: Not on file    Highest education level: Not on file   Occupational History    Not on file   Social Needs    Financial resource strain: Not on file    Food insecurity     Worry: Not on file     Inability: Not on file    Transportation needs     Medical: Not on file     Non-medical: Not on file   Tobacco Use    Smoking status: Former Smoker     Packs/day: 1.00     Years: 20.00     Pack years: 20.00     Types: Cigarettes     Last attempt to quit: 1991     Years since quittin.2    Smokeless tobacco: Never Used   Substance and Sexual Activity    Alcohol use: Not Currently     Alcohol/week: 0.0 standard drinks     Comment: rarely    Drug use: No    Sexual activity: Not on file   Lifestyle    Physical activity     Days per week: Not on file     Minutes per session: Not on file    Stress: Not on file   Relationships    Social connections     Talks on phone: Not on file     Gets together: Not on file     Attends Confucianism service: Not on file     Active member of club or organization: Not on file     Attends meetings of clubs or organizations: Not on file     Relationship status: Not on file    Intimate partner violence     Fear of current or ex partner: Not on file     Emotionally abused: Not on file     Physically abused: Not on file     Forced sexual activity: Not on file   Other Topics Concern    Not on file   Social History Narrative    Not on file     Current Facility-Administered Medications   Medication Dose Route Frequency Provider Last Rate Last Dose    0.9 % sodium chloride bolus  1,000 mL Intravenous Once Marisela Garibay MD         Current Outpatient Medications   Medication Sig Dispense Refill    Roflumilast (DALIRESP) 500 MCG tablet Take 1 tablet by mouth daily 30 tablet 11    azithromycin (ZITHROMAX) 250 MG tablet TAKE 1 TABLET BY MOUTH EVERY DAY 30 tablet 5    ipratropium (ATROVENT) 0.06 % nasal spray 2 SPRAYS BY NASAL ROUTE 2-4 TIMES DAILY 1 Bottle 5    teriparatide, recombinant, (FORTEO) 600 MCG/2.4ML SOLN injection Inject 20 mcg into the skin daily      albuterol sulfate  (90 Base) MCG/ACT inhaler INHALE 2 PUFFS INTO THE LUNGS EVERY 4 HOURS AS NEEDED FOR WHEEZING 1 Inhaler 5    metOLazone (ZAROXOLYN) 5 MG tablet TAKE 1 TABLET BY MOUTH EVERY DAY 30 tablet 0    spironolactone (ALDACTONE) 50 MG tablet Take 0.5 tablets by mouth daily 1 tablet 0    vitamin D (CHOLECALCIFEROL) 1000 UNIT TABS tablet Take 2,000 Units by mouth daily      calcium carbonate (OSCAL) 500 MG TABS tablet Take 500 mg by mouth daily      atorvastatin (LIPITOR) 40 MG tablet Take 40 mg by mouth      cyclobenzaprine (FLEXERIL) 10 MG tablet Take 10 mg by mouth      Insulin Syringe-Needle U-100 31G X 5/16\" 0.5 ML MISC USE 5 TIMES DAILY      meloxicam (MOBIC) 15 MG tablet Patient states taking only as needed      ACCU-CHEK CEDRICK PLUS strip TEST 4 TIMES DAILY  3    HYDROcodone-acetaminophen (NORCO) 7.5-325 MG per tablet TAKE 1 TABLET BY MOUTH 3 TIMES A DAY AS NEEDED  0    metoprolol succinate (TOPROL XL) 25 MG extended release tablet Take 25 mg by mouth daily      insulin glargine (LANTUS) 100 UNIT/ML injection vial Inject 25 Units into the skin nightly      aspirin EC 81 MG EC tablet Take 1 tablet by mouth daily      gabapentin (NEURONTIN) 300 MG capsule Take 1 capsule by mouth nightly      insulin lispro (HUMALOG) 100 UNIT/ML injection vial Inject 0-12 Units into the skin 3 times daily (with meals)      Misc.  Devices (ACAPELLA) MISC Take 1 Device by mouth as needed 1 each 0    fluticasone (FLONASE) 50 MCG/ACT nasal spray INHALE 2 SPRAYS IN EACH NOSTRIL DAILY 1 Bottle 5    cetirizine Platelets 362 283 - 780 K/uL    MPV 8.9 5.0 - 10.5 fL    Neutrophils % 75.1 %    Lymphocytes % 15.1 %    Monocytes % 6.8 %    Eosinophils % 1.6 %    Basophils % 1.4 %    Neutrophils Absolute 8.3 (H) 1.7 - 7.7 K/uL    Lymphocytes Absolute 1.7 1.0 - 5.1 K/uL    Monocytes Absolute 0.7 0.0 - 1.3 K/uL    Eosinophils Absolute 0.2 0.0 - 0.6 K/uL    Basophils Absolute 0.2 0.0 - 0.2 K/uL   Comprehensive Metabolic Panel w/ Reflex to MG   Result Value Ref Range    Sodium 126 (L) 136 - 145 mmol/L    Potassium reflex Magnesium 3.8 3.5 - 5.1 mmol/L    Chloride 85 (L) 99 - 110 mmol/L    CO2 28 21 - 32 mmol/L    Anion Gap 13 3 - 16    Glucose 395 (H) 70 - 99 mg/dL    BUN 30 (H) 7 - 20 mg/dL    CREATININE 1.5 (H) 0.6 - 1.2 mg/dL    GFR Non- 34 (A) >60    GFR  42 (A) >60    Calcium 8.5 8.3 - 10.6 mg/dL    Total Protein 6.6 6.4 - 8.2 g/dL    Alb 3.3 (L) 3.4 - 5.0 g/dL    Albumin/Globulin Ratio 1.0 (L) 1.1 - 2.2    Total Bilirubin 0.7 0.0 - 1.0 mg/dL    Alkaline Phosphatase 191 (H) 40 - 129 U/L    ALT 11 10 - 40 U/L    AST 17 15 - 37 U/L    Globulin 3.3 g/dL   Blood gas, venous   Result Value Ref Range    pH, Serafin 7.370 7.350 - 7.450    pCO2, Serafin 50.4 (H) 40.0 - 50.0 mmHg    pO2, Serafin 32.6 25.0 - 40.0 mmHg    HCO3, Venous 28.5 23.0 - 29.0 mmol/L    Base Excess, Serafin 2.4 -3.0 - 3.0 mmol/L    O2 Sat, Serafin 60 Not Established %    Carboxyhemoglobin 3.3 (H) 0.0 - 1.5 %    MetHgb, Serafin 0.3 <1.5 %    TC02 (Calc), Serafin 30 Not Established mmol/L    O2 Content, Serafin 10 Not Established VOL %    O2 Therapy Unknown    TSH with Reflex   Result Value Ref Range    TSH 1.17 0.27 - 4.20 uIU/mL   POCT glucose   Result Value Ref Range    Glucose 333 mg/dL    QC OK?  yes    POCT Glucose   Result Value Ref Range    POC Glucose 333 (H) 70 - 99 mg/dl    Performed on ACCU-CHEK    EKG 12 Lead   Result Value Ref Range    Ventricular Rate 93 BPM    Atrial Rate 93 BPM    P-R Interval 164 ms    QRS Duration 88 ms    Q-T Interval 360 ms lungs are without acute focal process. There is no effusion or pneumothorax. The cardiomediastinal silhouette is without acute process. The osseous structures are without acute process. Similar deformity of right proximal humerus. No acute process. ED COURSE/MDM  Patient seen and evaluated. Old records reviewed. Labs and imaging reviewed and results discussed with patient. The patient has had increasing medication use, along with confusion, decreased oral intake, and 100 hyperglycemic, dehydrated, has a acute kidney injury, she has been given fluids, temperature was 99.8 but she does not have a cough, has clear urine and a clear chest x-ray. Will admit for further fluids, and likely medication adjustment. Patient was given scripts for the following medications. I counseled patient how to take these medications. New Prescriptions    No medications on file       CLINICAL IMPRESSION  1. ISI (acute kidney injury) ([facility] [address]    2. Polypharmacy    3. Hyperglycemia    4.  Dehydration                        Go Hernandez MD  06/28/20 3524

## 2020-06-28 NOTE — ED NOTES
Patient report given to [facility] to resume patient care.      Soraya Wilson RN  06/28/20 3163

## 2020-06-28 NOTE — ED NOTES
Perfect serve message to Dr. Indigo Al @ [facility]  06/28/20 1956    Dr Indigo Al returned the call to Dr Lucia Zhou @ [address] [facility]  06/28/20 2013

## 2020-06-29 LAB
A/G RATIO: 0.8 (ref 1.1–2.2)
ALBUMIN SERPL-MCNC: 2.6 G/DL (ref 3.4–5)
ALP BLD-CCNC: 138 U/L (ref 40–129)
ALT SERPL-CCNC: 10 U/L (ref 10–40)
ANION GAP SERPL CALCULATED.3IONS-SCNC: 13 MMOL/L (ref 3–16)
AST SERPL-CCNC: 19 U/L (ref 15–37)
BASOPHILS ABSOLUTE: 0 K/UL (ref 0–0.2)
BASOPHILS RELATIVE PERCENT: 0.4 %
BILIRUB SERPL-MCNC: 1.2 MG/DL (ref 0–1)
BUN BLDV-MCNC: 18 MG/DL (ref 7–20)
CALCIUM SERPL-MCNC: 7.7 MG/DL (ref 8.3–10.6)
CHLORIDE BLD-SCNC: 93 MMOL/L (ref 99–110)
CO2: 24 MMOL/L (ref 21–32)
CREAT SERPL-MCNC: 0.9 MG/DL (ref 0.6–1.2)
D DIMER: >5250 NG/ML DDU (ref 0–229)
D DIMER: >5250 NG/ML DDU (ref 0–229)
EKG ATRIAL RATE: 93 BPM
EKG DIAGNOSIS: NORMAL
EKG P AXIS: 23 DEGREES
EKG P-R INTERVAL: 164 MS
EKG Q-T INTERVAL: 360 MS
EKG QRS DURATION: 88 MS
EKG QTC CALCULATION (BAZETT): 447 MS
EKG R AXIS: -35 DEGREES
EKG T AXIS: 68 DEGREES
EKG VENTRICULAR RATE: 93 BPM
EOSINOPHILS ABSOLUTE: 0.1 K/UL (ref 0–0.6)
EOSINOPHILS RELATIVE PERCENT: 1.1 %
ESTIMATED AVERAGE GLUCOSE: 188.6 MG/DL
FERRITIN: 99.8 NG/ML (ref 15–150)
GFR AFRICAN AMERICAN: >60
GFR NON-AFRICAN AMERICAN: >60
GLOBULIN: 3.4 G/DL
GLUCOSE BLD-MCNC: 244 MG/DL (ref 70–99)
GLUCOSE BLD-MCNC: 253 MG/DL (ref 70–99)
GLUCOSE BLD-MCNC: 262 MG/DL (ref 70–99)
GLUCOSE BLD-MCNC: 271 MG/DL (ref 70–99)
GLUCOSE BLD-MCNC: 278 MG/DL (ref 70–99)
GLUCOSE BLD-MCNC: 298 MG/DL (ref 70–99)
HBA1C MFR BLD: 8.2 %
HCT VFR BLD CALC: 35 % (ref 36–48)
HEMOGLOBIN: 11.7 G/DL (ref 12–16)
LYMPHOCYTES ABSOLUTE: 1 K/UL (ref 1–5.1)
LYMPHOCYTES RELATIVE PERCENT: 10.4 %
MAGNESIUM: 2.1 MG/DL (ref 1.8–2.4)
MCH RBC QN AUTO: 31.1 PG (ref 26–34)
MCHC RBC AUTO-ENTMCNC: 33.4 G/DL (ref 31–36)
MCV RBC AUTO: 93.1 FL (ref 80–100)
MONOCYTES ABSOLUTE: 0.6 K/UL (ref 0–1.3)
MONOCYTES RELATIVE PERCENT: 6.2 %
NEUTROPHILS ABSOLUTE: 7.8 K/UL (ref 1.7–7.7)
NEUTROPHILS RELATIVE PERCENT: 81.9 %
PDW BLD-RTO: 16.5 % (ref 12.4–15.4)
PERFORMED ON: ABNORMAL
PLATELET # BLD: 167 K/UL (ref 135–450)
PMV BLD AUTO: 8.9 FL (ref 5–10.5)
POTASSIUM REFLEX MAGNESIUM: 3.4 MMOL/L (ref 3.5–5.1)
RBC # BLD: 3.76 M/UL (ref 4–5.2)
SARS-COV-2, PCR: NOT DETECTED
SODIUM BLD-SCNC: 130 MMOL/L (ref 136–145)
TOTAL PROTEIN: 6 G/DL (ref 6.4–8.2)
WBC # BLD: 9.5 K/UL (ref 4–11)

## 2020-06-29 PROCEDURE — 99232 SBSQ HOSP IP/OBS MODERATE 35: CPT | Performed by: INTERNAL MEDICINE

## 2020-06-29 PROCEDURE — 96375 TX/PRO/DX INJ NEW DRUG ADDON: CPT

## 2020-06-29 PROCEDURE — 96376 TX/PRO/DX INJ SAME DRUG ADON: CPT

## 2020-06-29 PROCEDURE — 83735 ASSAY OF MAGNESIUM: CPT

## 2020-06-29 PROCEDURE — 85025 COMPLETE CBC W/AUTO DIFF WBC: CPT

## 2020-06-29 PROCEDURE — 93010 ELECTROCARDIOGRAM REPORT: CPT | Performed by: INTERNAL MEDICINE

## 2020-06-29 PROCEDURE — 6370000000 HC RX 637 (ALT 250 FOR IP): Performed by: INTERNAL MEDICINE

## 2020-06-29 PROCEDURE — 6360000002 HC RX W HCPCS: Performed by: INTERNAL MEDICINE

## 2020-06-29 PROCEDURE — 1200000000 HC SEMI PRIVATE

## 2020-06-29 PROCEDURE — 96372 THER/PROPH/DIAG INJ SC/IM: CPT

## 2020-06-29 PROCEDURE — G0378 HOSPITAL OBSERVATION PER HR: HCPCS

## 2020-06-29 PROCEDURE — 2580000003 HC RX 258: Performed by: INTERNAL MEDICINE

## 2020-06-29 PROCEDURE — 85379 FIBRIN DEGRADATION QUANT: CPT

## 2020-06-29 PROCEDURE — 80053 COMPREHEN METABOLIC PANEL: CPT

## 2020-06-29 PROCEDURE — 36415 COLL VENOUS BLD VENIPUNCTURE: CPT

## 2020-06-29 RX ORDER — PROCHLORPERAZINE EDISYLATE 5 MG/ML
10 INJECTION INTRAMUSCULAR; INTRAVENOUS EVERY 6 HOURS PRN
Status: DISCONTINUED | OUTPATIENT
Start: 2020-06-29 | End: 2020-07-01 | Stop reason: HOSPADM

## 2020-06-29 RX ORDER — HYDROCODONE BITARTRATE AND ACETAMINOPHEN 5; 325 MG/1; MG/1
1 TABLET ORAL EVERY 6 HOURS PRN
Status: DISCONTINUED | OUTPATIENT
Start: 2020-06-29 | End: 2020-07-01 | Stop reason: HOSPADM

## 2020-06-29 RX ORDER — MORPHINE SULFATE 2 MG/ML
2 INJECTION, SOLUTION INTRAMUSCULAR; INTRAVENOUS EVERY 4 HOURS PRN
Status: DISCONTINUED | OUTPATIENT
Start: 2020-06-29 | End: 2020-07-01 | Stop reason: HOSPADM

## 2020-06-29 RX ORDER — SUCRALFATE 1 G/1
1 TABLET ORAL EVERY 6 HOURS SCHEDULED
Status: DISCONTINUED | OUTPATIENT
Start: 2020-06-29 | End: 2020-07-01 | Stop reason: HOSPADM

## 2020-06-29 RX ORDER — PREDNISONE 1 MG/1
4 TABLET ORAL DAILY
Status: DISCONTINUED | OUTPATIENT
Start: 2020-06-29 | End: 2020-07-01 | Stop reason: HOSPADM

## 2020-06-29 RX ORDER — PREGABALIN 25 MG/1
50 CAPSULE ORAL 2 TIMES DAILY
Status: DISCONTINUED | OUTPATIENT
Start: 2020-06-29 | End: 2020-06-30

## 2020-06-29 RX ADMIN — SUCRALFATE 1 G: 1 TABLET ORAL at 17:15

## 2020-06-29 RX ADMIN — MORPHINE SULFATE 2 MG: 2 INJECTION, SOLUTION INTRAMUSCULAR; INTRAVENOUS at 12:08

## 2020-06-29 RX ADMIN — SODIUM CHLORIDE: 9 INJECTION, SOLUTION INTRAVENOUS at 09:53

## 2020-06-29 RX ADMIN — AZITHROMYCIN MONOHYDRATE 250 MG: 250 TABLET ORAL at 08:42

## 2020-06-29 RX ADMIN — SUCRALFATE 1 G: 1 TABLET ORAL at 23:50

## 2020-06-29 RX ADMIN — ENOXAPARIN SODIUM 40 MG: 40 INJECTION SUBCUTANEOUS at 08:43

## 2020-06-29 RX ADMIN — FOLIC ACID 1 MG: 1 TABLET ORAL at 08:42

## 2020-06-29 RX ADMIN — ONDANSETRON HYDROCHLORIDE 4 MG: 2 INJECTION, SOLUTION INTRAMUSCULAR; INTRAVENOUS at 09:51

## 2020-06-29 RX ADMIN — POTASSIUM CHLORIDE 10 MEQ: 7.46 INJECTION, SOLUTION INTRAVENOUS at 12:57

## 2020-06-29 RX ADMIN — SUCRALFATE 1 G: 1 TABLET ORAL at 09:51

## 2020-06-29 RX ADMIN — METOPROLOL SUCCINATE 25 MG: 25 TABLET, EXTENDED RELEASE ORAL at 08:42

## 2020-06-29 RX ADMIN — POTASSIUM CHLORIDE 10 MEQ: 7.46 INJECTION, SOLUTION INTRAVENOUS at 09:58

## 2020-06-29 RX ADMIN — ONDANSETRON HYDROCHLORIDE 4 MG: 2 INJECTION, SOLUTION INTRAMUSCULAR; INTRAVENOUS at 17:15

## 2020-06-29 RX ADMIN — Medication 10 ML: at 23:39

## 2020-06-29 RX ADMIN — Medication 10 ML: at 08:43

## 2020-06-29 RX ADMIN — POTASSIUM CHLORIDE 10 MEQ: 7.46 INJECTION, SOLUTION INTRAVENOUS at 10:59

## 2020-06-29 RX ADMIN — INSULIN GLARGINE 25 UNITS: 100 INJECTION, SOLUTION SUBCUTANEOUS at 23:51

## 2020-06-29 RX ADMIN — ONDANSETRON HYDROCHLORIDE 4 MG: 2 INJECTION, SOLUTION INTRAMUSCULAR; INTRAVENOUS at 06:37

## 2020-06-29 RX ADMIN — SODIUM CHLORIDE: 9 INJECTION, SOLUTION INTRAVENOUS at 23:40

## 2020-06-29 RX ADMIN — MORPHINE SULFATE 2 MG: 2 INJECTION, SOLUTION INTRAMUSCULAR; INTRAVENOUS at 23:38

## 2020-06-29 RX ADMIN — POTASSIUM CHLORIDE 10 MEQ: 7.46 INJECTION, SOLUTION INTRAVENOUS at 11:59

## 2020-06-29 RX ADMIN — INSULIN LISPRO 6 UNITS: 100 INJECTION, SOLUTION INTRAVENOUS; SUBCUTANEOUS at 17:21

## 2020-06-29 RX ADMIN — SUCRALFATE 1 G: 1 TABLET ORAL at 23:38

## 2020-06-29 RX ADMIN — ASPIRIN 81 MG: 81 TABLET, COATED ORAL at 08:42

## 2020-06-29 RX ADMIN — MORPHINE SULFATE 2 MG: 2 INJECTION, SOLUTION INTRAMUSCULAR; INTRAVENOUS at 17:16

## 2020-06-29 RX ADMIN — ONDANSETRON HYDROCHLORIDE 4 MG: 2 INJECTION, SOLUTION INTRAMUSCULAR; INTRAVENOUS at 23:38

## 2020-06-29 ASSESSMENT — PAIN DESCRIPTION - ORIENTATION
ORIENTATION: LOWER
ORIENTATION: LOWER;MID
ORIENTATION: LOWER;MID

## 2020-06-29 ASSESSMENT — PAIN DESCRIPTION - FREQUENCY
FREQUENCY: CONTINUOUS

## 2020-06-29 ASSESSMENT — PAIN DESCRIPTION - LOCATION
LOCATION: BACK

## 2020-06-29 ASSESSMENT — PAIN DESCRIPTION - DESCRIPTORS
DESCRIPTORS: DISCOMFORT
DESCRIPTORS: ACHING;DISCOMFORT
DESCRIPTORS: DISCOMFORT

## 2020-06-29 ASSESSMENT — PAIN DESCRIPTION - ONSET
ONSET: ON-GOING

## 2020-06-29 ASSESSMENT — PAIN DESCRIPTION - PAIN TYPE
TYPE: CHRONIC PAIN

## 2020-06-29 ASSESSMENT — PAIN SCALES - GENERAL
PAINLEVEL_OUTOF10: 10
PAINLEVEL_OUTOF10: 3
PAINLEVEL_OUTOF10: 8
PAINLEVEL_OUTOF10: 3
PAINLEVEL_OUTOF10: 7

## 2020-06-29 ASSESSMENT — PAIN - FUNCTIONAL ASSESSMENT: PAIN_FUNCTIONAL_ASSESSMENT: PREVENTS OR INTERFERES SOME ACTIVE ACTIVITIES AND ADLS

## 2020-06-29 ASSESSMENT — PAIN DESCRIPTION - PROGRESSION
CLINICAL_PROGRESSION: NOT CHANGED
CLINICAL_PROGRESSION: NOT CHANGED

## 2020-06-29 NOTE — PROGRESS NOTES
acetaminophen, polyethylene glycol, glucose, dextrose, glucagon (rDNA), dextrose, potassium chloride, magnesium sulfate, ondansetron    Lab Data:  Recent Labs     06/28/20  1435 06/29/20  0525   WBC 11.0 9.5   HGB 12.5 11.7*   HCT 37.7 35.0*   MCV 93.3 93.1    167     Recent Labs     06/28/20  1435 06/29/20  0525   * 130*   K 3.8 3.4*   CL 85* 93*   CO2 28 24   BUN 30* 18   CREATININE 1.5* 0.9     No results for input(s): CKTOTAL, CKMB, CKMBINDEX, TROPONINI in the last 72 hours. Coagulation:   Lab Results   Component Value Date    INR 1.08 06/28/2020    APTT 25.4 06/28/2020     Cardiac markers:   Lab Results   Component Value Date    TROPONINI <0.01 07/06/2019         Lab Results   Component Value Date    ALT 10 06/29/2020    AST 19 06/29/2020    ALKPHOS 138 (H) 06/29/2020    BILITOT 1.2 (H) 06/29/2020       Lab Results   Component Value Date    INR 1.08 06/28/2020    INR 0.95 08/27/2012    PROTIME 12.5 06/28/2020    PROTIME 10.9 08/27/2012           Assessment & Plan:      ISI, 1.5,. Suspect prerenal causes as patient appears dehydrated. Baseline creatinine appears to be around 0.8-0.9. Hold home Mobic ane metolazone . Given IVF and resolved now    Acute encephalopathy, possibly multifactorial, polypharmacy/dehydration. Hold all home sedating medications for now including Norco, Flexeril, Neurontin/Lyrica. now resolved with holding meds. Can resume at lower dose. Avoid high doses of lyrica . Avoid OTC sedating meds    DM2, poorly controlled, initial , hold oral Rx, chk A1c, add Mod SSI, continue home Lantus dose. COVID rule out, droplet plus precautions    CAD, continue home aspirin and beta-blocker. GERD, continue PPI. HLD, continue home statin. Rheumatoid arthritis, holding enbrel and resume MTX. Resume prednisone 4 mg     COPD /tracheomalacia - stable. F/w Dr. Nemo Kearney . On daily azithromycin. Resume cpap at night time. Pedal edema smith - chronic .  Rule out dvt     DVT Prophylaxis: Lx  Diet: Carb control  Code Status: Full Code     Sunday Morales MD 6/29/2020 7:44 AM

## 2020-06-29 NOTE — PLAN OF CARE
Problem: Falls - Risk of:  Goal: Will remain free from falls  Description: Will remain free from falls  6/29/2020 1030 by Clifford Burgess RN  Outcome: Ongoing  6/29/2020 0433 by Fleet Koyanagi, RN  Outcome: Ongoing  6/29/2020 0433 by Fleet Koyanagi, RN  Outcome: Ongoing  Goal: Absence of physical injury  Description: Absence of physical injury  6/29/2020 1030 by Clifford Burgess RN  Outcome: Ongoing  6/29/2020 0433 by Fleet Koyanagi, RN  Outcome: Ongoing  6/29/2020 0433 by Fleet Koyanagi, RN  Outcome: Ongoing     Problem: SAFETY  Goal: Free from accidental physical injury  6/29/2020 1030 by Clifford Burgess RN  Outcome: Ongoing  6/29/2020 0433 by Fleet Koyanagi, RN  Outcome: Ongoing  6/29/2020 0433 by Fleet Koyanagi, RN  Outcome: Ongoing  Goal: Free from intentional harm  6/29/2020 1030 by Clifford Burgess RN  Outcome: Ongoing  6/29/2020 0433 by Fleet Koyanagi, RN  Outcome: Ongoing  6/29/2020 0433 by Fleet Koyanagi, RN  Outcome: Ongoing

## 2020-06-29 NOTE — PLAN OF CARE
Problem: Falls - Risk of:  Goal: Will remain free from falls  Description: Will remain free from falls  6/29/2020 0433 by Arvin Scott RN  Outcome: Ongoing  6/29/2020 0433 by Arvin Scott RN  Outcome: Ongoing  Goal: Absence of physical injury  Description: Absence of physical injury  6/29/2020 0433 by Arvin Scott RN  Outcome: Ongoing  6/29/2020 0433 by Arvin Scott RN  Outcome: Ongoing     Problem: SAFETY  Goal: Free from accidental physical injury  6/29/2020 0433 by Arvin Scott RN  Outcome: Ongoing  6/29/2020 0433 by Arvin Scott RN  Outcome: Ongoing  Goal: Free from intentional harm  6/29/2020 0433 by Arvin Scott RN  Outcome: Ongoing  6/29/2020 0433 by Arvin Scott RN  Outcome: Ongoing     Problem: DAILY CARE  Goal: Daily care needs are met  6/29/2020 0433 by Arvin Scott RN  Outcome: Ongoing  6/29/2020 0433 by Arvin Scott RN  Outcome: Ongoing     Problem: PAIN  Goal: Patient's pain/discomfort is manageable  6/29/2020 0433 by Arvin Scott RN  Outcome: Ongoing  6/29/2020 0433 by Arvin Scott RN  Outcome: Ongoing     Problem: SKIN INTEGRITY  Goal: Skin integrity is maintained or improved  6/29/2020 0433 by Arvin Scott RN  Outcome: Ongoing  6/29/2020 0433 by Arvin Scott RN  Outcome: Ongoing     Problem: KNOWLEDGE DEFICIT  Goal: Patient/S.O. demonstrates understanding of disease process, treatment plan, medications, and discharge instructions.   6/29/2020 0433 by Arvin Scott RN  Outcome: Ongoing  6/29/2020 0433 by Arvin Scott RN  Outcome: Ongoing     Problem: DISCHARGE BARRIERS  Goal: Patient's continuum of care needs are met  6/29/2020 0433 by Arvin Scott RN  Outcome: Ongoing  6/29/2020 0433 by Arvin Scott RN  Outcome: Ongoing     Problem: Mental Status - Impaired:  Goal: Mental status will improve  Description: Mental status will improve  6/29/2020 0433 by Arvin Scott RN  Outcome: Ongoing  6/29/2020 0433 by Arvin Scott RN  Outcome: Ongoing

## 2020-06-29 NOTE — CONSULTS
[facility] Nurse  Consult Note       NAME:  Kaiser Campoverde  MEDICAL RECORD NUMBER:  [id number]  AGE: 77 y.o.    GENDER: female  : 1950  TODAY'S DATE:  2020    Subjective   Reason for WOCN Evaluation and Assessment: coccyx, stage 2 pressure injury, present on admission      Kaiser Campoverde is a 77 y.o. female referred by:   [] Physician  [] Nursing  [] Other:     Wound Identification:  Wound Type: pressure  Contributing Factors: chronic pressure, decreased mobility and moisture        Patient Goal of Care:  [x] Wound Healing  [] Odor Control  [] Palliative Care  [] Pain Control   [] Other:         PAST MEDICAL HISTORY        Diagnosis Date    Arthritis     Back pain     Bronchiectasis ([facility] [address] 10/15/2014    Chronic rheumatic arthritis ([facility] [address]     COPD (chronic obstructive pulmonary disease) ([facility] [address]     Diabetes mellitus (HCC)     GERD (gastroesophageal reflux disease)     Hypertension     Pneumonia     Psoriasis     hands and feet    RA (rheumatoid arthritis) ([facility] [address]     Sleep apnea     Tracheomalacia 2019       PAST SURGICAL HISTORY    Past Surgical History:   Procedure Laterality Date    ARTERY SURGERY  13    left temporal artery biopsy    BRONCHOSCOPY      BRONCHOSCOPY N/A 2019    BRONCHOSCOPY ALVEOLAR LAVAGE performed by French Pedro MD at [address] [place] [address]      HERNIA REPAIR      KNEE ARTHROSCOPY      left    KYPHOSIS SURGERY      LAMINECTOMY      MANDIBLE FRACTURE SURGERY      MANDIBLE FRACTURE SURGERY  2020    SEPTOPLASTY  2013    FESS with balloon    SPINAL FUSION      TUBAL LIGATION      UPPER GASTROINTESTINAL ENDOSCOPY  2014    Dilitation       FAMILY HISTORY    Family History   Problem Relation Age of Onset    Diabetes Mother     Hypertension Mother     Asthma Other     Heart Disease Brother     Diabetes Brother     Diabetes Sister     Heart Disease Brother     TEST 4 TIMES DAILY  3    HYDROcodone-acetaminophen (NORCO) 7.5-325 MG per tablet TAKE 1 TABLET BY MOUTH 3 TIMES A DAY AS NEEDED  0    metoprolol succinate (TOPROL XL) 25 MG extended release tablet Take 25 mg by mouth daily      insulin glargine (LANTUS) 100 UNIT/ML injection vial Inject 25 Units into the skin nightly      gabapentin (NEURONTIN) 300 MG capsule Take 1 capsule by mouth nightly      insulin lispro (HUMALOG) 100 UNIT/ML injection vial Inject 0-12 Units into the skin 3 times daily (with meals)      Misc. Devices (ACAPELLA) MISC Take 1 Device by mouth as needed 1 each 0    fluticasone (FLONASE) 50 MCG/ACT nasal spray INHALE 2 SPRAYS IN EACH NOSTRIL DAILY 1 Bottle 5    cetirizine (ZYRTEC) 10 MG tablet Take 10 mg by mouth daily.  etanercept (ENBREL) 50 MG/ML injection Inject 50 mg into the skin every 7 days.  omeprazole (PRILOSEC) 40 MG capsule Take 40 mg by mouth daily       folic acid (FOLVITE) 1 MG tablet Take 1 mg by mouth daily.            Objective    /67   Pulse 94   Temp 97.8 °F (36.6 °C) (Oral)   Resp 16   Ht 5' 10\" (1.778 m)   Wt 178 lb 6.4 oz (80.9 kg)   SpO2 97%   BMI 25.60 kg/m²     LABS:  WBC:    Lab Results   Component Value Date    WBC 9.5 06/29/2020     H/H:    Lab Results   Component Value Date    HGB 11.7 06/29/2020    HCT 35.0 06/29/2020     PTT:    Lab Results   Component Value Date    APTT 25.4 06/28/2020   [APTT}  PT/INR:    Lab Results   Component Value Date    PROTIME 12.5 06/28/2020    INR 1.08 06/28/2020     HgBA1c:    Lab Results   Component Value Date    LABA1C 8.2 06/28/2020       Assessment   Sven Risk Score: Sven Scale Score: 20    Patient Active Problem List   Diagnosis Code    Rheumatoid arthritis ([facility] [address] M06.9    Bronchiectasis ([facility] [address] J47.9    Chest pain R07.9    LLL pneumonia ([facility] [address] J18.1    Psoriasis L40.9    Hypertension I10    GERD (gastroesophageal reflux disease) K21.9    Former smoker Z87.891    Hyponatremia E87.1    Hypochloremia E87.8    Hyperglycemia R73.9    DM2/IDDM E11.9    Sepsis (HCC) A41.9    Chronic normocytic anemia D64.9    Community acquired pneumonia of left lower lobe of lung ([facility] [address] J18.1    Bronchiectasis with acute exacerbation (HCC) J47.1    Cylindrical bronchiectasis (HCC) J47.9    Mucus plugging of bronchi J98.09    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    Immunocompromised state ([facility] [address] D89.9    ISI (acute kidney injury) ([facility] [address] N17.9    Hypokalemia E87.6    Fatigue R53.83    Acute encephalopathy G93.40    Dehydration E86.0    Polypharmacy Z79.899       Measurements:  Incision 04/13/11 Foot Left (Active)   Number of days: 3365       Incision 02/16/12 Back Lower (Active)   Number of days: 3056       Incision 05/30/13 Left (Active)   Number of days: 2587       Wound 06/28/20 Coccyx Mid Stage 2 pressure injury, present on admission (Active)   Wound Image   6/29/2020  5:45 PM   Wound Pressure Stage  2 6/29/2020  5:45 PM   Dressing Changed Changed/New 6/29/2020  5:45 PM   Wound Cleansed Rinsed/Irrigated with saline 6/29/2020  5:45 PM   Dressing Change Due 06/30/20 6/29/2020  5:45 PM   Wound Length (cm) 1.2 cm 6/29/2020  5:45 PM   Wound Width (cm) 0.4 cm 6/29/2020  5:45 PM   Wound Depth (cm) 1 cm 6/29/2020  5:45 PM   Wound Surface Area (cm^2) 0.48 cm^2 6/29/2020  5:45 PM   Wound Volume (cm^3) 0.48 cm^3 6/29/2020  5:45 PM   Drainage Amount Small 6/29/2020  5:45 PM   Drainage Description Serosanguinous 6/29/2020  5:45 PM   Odor None 6/29/2020  5:45 PM   Margins Defined edges 6/29/2020  5:45 PM   Blanca-wound Assessment Intact 6/29/2020  5:45 PM   Non-staged Wound Description Partial thickness 6/29/2020  5:45 PM   [place]%Wound Bed 100 6/29/2020  5:45 PM   Number of days: 1                Response to treatment:  Well tolerated by patient. Plan Cleanse with NSS, pat dry. Fill depth with strip of alginate ag and cover with 3 inch foam dressing. Change daily and prn.      Plan of Care:      Specialty Bed Required : Yes   [x] Low Air Loss   [] Pressure Redistribution  [] Fluid Immersion  [] Bariatric  [] Total Pressure Relief  [] Other:     Current Diet: DIET CARB CONTROL; Carb Control: 4 carb choices (60 gms)/meal  Dietician consult:  Yes    Discharge Plan:  Placement for patient upon discharge: unknown  Patient appropriate for Outpatient [address]: Yes    Referrals:  []   [x] [address] [employer] [facility]  [x] Supplies  [] Other    Patient/Caregiver Teaching:  Level of patient/caregiver understanding able to:   [] Indicates understanding       [x] Needs reinforcement  [] Unsuccessful      [] Verbal Understanding  [] Demonstrated understanding       [] No evidence of learning  [] Refused teaching         [] N/A       Pt seen for wound care to the coccyx  Pt has stage 2 PI, present on admission. ,  Appears to have started as intertrigo and progressed to pressure. Edges are white, thick and macerated. Pt states she sits on her porch in the sun most of the day. Pt is ambulatory and can turn self in bed. Encourage turning .     Electronically signed by Felice Salcedo RN, Abdullahi Tinajero on 6/29/2020 at 5:48 PM

## 2020-06-29 NOTE — PROGRESS NOTES
Spoke to pt family about pt status with pt permission. Daughter asked about lab values, orientation of pt, if pt was feeling better. Advised what lab values were, that pt was more alert this AM but not feeling well, That we can not give pain medication because of her AMS at the beginning of shift and that I gave pt zofran for nausea. Daughter ok with these answers.

## 2020-06-29 NOTE — PROGRESS NOTES
4 Eyes Skin Assessment     The patient is being assess for   Admission    I agree that 2 RN's have performed a thorough Head to Toe Skin Assessment on the patient. ALL assessment sites listed below have been assessed. Areas assessed by both nurses:   [x]   Head, Face, and Ears   [x]   Shoulders, Back, and Chest, Abdomen  [x]   Arms, Elbows, and Hands   [x]   Coccyx, Sacrum, and Ischium  [x]   Legs, Feet, and Heels        Scattered bruising, 2+ Swelling in Lower extremities, Raised swollen bump on Left arm, Shingles scabbed over on right side of neck. Open wound on coccyx 1.5cm x .5cm unstageable. **SHARE this note so that the co-signing nurse is able to place an eSignature**    Co-signer eSignature: Electronically signed by Erickson Cummings RN on 6/29/20 at 7:28 AM EDT    Does the Patient have Skin Breakdown?   No          Sven Prevention initiated:  Yes   Wound Care Orders initiated:  No      [facility] nurse consulted for Pressure Injury (Stage 3,4, Unstageable, DTI, NWPT, Complex wounds)and New or Established Ostomies:  Yes      Primary Nurse eSignature: Electronically signed by Rosemarie Boyce RN on 6/29/20 at 12:45 AM EDT

## 2020-06-29 NOTE — CARE COORDINATION
Case Management Assessment  Initial Evaluation    Date/Time of Evaluation: 6/29/2020 4:57 PM  Assessment Completed by: Junior Lindsay    Patient Name: Lay Tavarez  YOB: 1950  Diagnosis: Acute encephalopathy [G93.40]  Date / Time: 6/28/2020  2:24 PM  Admission status/Date: IP 06/28/2020  Chart Reviewed: Yes      Patient Interviewed: No - attempted phone interview-no ans  Family Interviewed:  Yes - daughter, Lexie      Hospitalization in the last 30 days:  No    Current PCP Thomas Calabrese, 1955 [facility] Medicare  Precert required for SNF : YES        3 night stay required - NA- WAIVED    ADLS  Support Systems: Family Members  Transportation: family    Meal Preparation: self/granddaughter    Housing  Home Environment: Lives in her home with her grand iraj who assists PRN, family can be available for 24/7 if needed. Steps: none  Plans to Return to Present Housing: Yes  Other Identified Issues: pt propels self in the house with WC and can ambulate very short distances with [address]  Currently active with [address] : No  Type of Home Care Services: None  Passport/Waiver : No  :                      Phone Number:    Passport/Waiver Services: NA          Durable Medical Equipment   DME Provider:   Equipment: Walker__X_Cane___RTS___ BSC___Shower Chair___  02__ at ____Liter(s)---Uses________HHN___ CPAP___ BiPap___ Hospital Bed___W/C__X__Other________      Has Home O2 in place on admit:  No - she is currently 97% on RA    Community Service Affiliation  Dialysis:  No    · Name:  · Location  · Dialysis Schedule:  · Phone:   · Fax: Outpatient PT/OT: No    Cancer Center: No     CHF Clinic: No     Pulmonary Rehab: No  Pain Clinic: No  Community Mental Health: No    Wound Clinic: No     COVID SCREENING: Yes - PENDING     The Plan for Transition of Care is related to the following treatment goals: to return home.      The Patient and/or patient representative, daughter, Joselyn Young was provided with a choice of provider and agrees   with the discharge plan. [x] Yes [] No    DISCHARGE PLAN: Spoke w/ daughter Joselyn on the phone due to COVID restrictions. Explained Case Management role/services. If mom needs 24/7 supervision family can either providfe it in the patients home or they may have her come home with Joselyn Young as she has done in the past. Unsure of [place] [id number] needs. +CM will follow and reassess needs.

## 2020-06-29 NOTE — H&P
Hospital Medicine History & Physical      PCP: Heber Valero MD    Date of Service: Pt seen/examined on 6/28/20 and admitted on 6/28/20 to Inpatient    Chief Complaint   Patient presents with    Fatigue     started yesterday, confusion per family       History Of Present Illness: The patient is a 77 y.o. female with PMH below, presents with MS change/confusion, lethargy, fatigue, poor PO intake, chronic cough. Much of the info contained herein was obtained from the EMR and the ED physician/staff. ED physician reportedly discussed pt condition w/ the pts daughter in the ER. The above sx have been progressive over the last few days, especially, the last 24 hours. The pt just says she feels very tired. She is not able to give much helpful hx 2/2 being somnolent. Reportedly, her Lyrica was doubled in the past week 2/2 post shingles neuralgia. She is reportedly been taking Benadryl for itching. She is also on  which could be contributing: Norco 7.5.        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 [facility]      FOOT SURGERY      HERNIA REPAIR      KNEE ARTHROSCOPY      left    KYPHOSIS SURGERY      LAMINECTOMY      MANDIBLE FRACTURE SURGERY      MANDIBLE FRACTURE SURGERY  02/2020    SEPTOPLASTY  5/7/2013    FESS with balloon    SPINAL FUSION      TUBAL LIGATION      UPPER GASTROINTESTINAL ENDOSCOPY  4/8/2014    Dilitation       Medications Prior to Admission:    Prior to Admission medications    Medication Sig Start Date End Date Taking?  Authorizing Provider   Roflumilast (DALIRESP) 500 MCG tablet Take 1 tablet by mouth daily 5/19/20   Renee Hunt MD   azithromycin (ZITHROMAX) 250 MG tablet TAKE 1 TABLET BY MOUTH EVERY DAY 4/2/20   Renee Hunt MD   ipratropium (ATROVENT) 0.06 % nasal spray 2 SPRAYS BY NASAL ROUTE 2-4 TIMES DAILY 2/25/20   Renee Hunt MD   teriparatide, recombinant, (FORTEO) 600 MCG/2.4ML SOLN injection Inject 20 mcg into the skin daily    Historical Provider, MD   albuterol sulfate  (90 Base) MCG/ACT inhaler INHALE 2 PUFFS INTO THE LUNGS EVERY 4 HOURS AS NEEDED FOR WHEEZING 1/20/20   Nitin Carreon MD   metOLazone (ZAROXOLYN) 5 MG tablet TAKE 1 TABLET BY MOUTH EVERY DAY 8/16/19   Renee Hunt MD   spironolactone (ALDACTONE) 50 MG tablet Take 0.5 tablets by mouth daily 7/9/19   Lorraine MD Jim   vitamin D (CHOLECALCIFEROL) 1000 UNIT TABS tablet Take 2,000 Units by mouth daily    Historical Provider, MD   calcium carbonate (OSCAL) 500 MG TABS tablet Take 500 mg by mouth daily    Historical Provider, MD   atorvastatin (LIPITOR) 40 MG tablet Take 40 mg by mouth 10/16/18   Historical Provider, MD   cyclobenzaprine (FLEXERIL) 10 MG tablet Take 10 mg by mouth    Historical Provider, MD   Insulin Syringe-Needle U-100 31G X 5/16\" 0.5 ML MISC USE 5 TIMES DAILY 5/30/18   Historical Provider, MD   meloxicam (MOBIC) 15 MG tablet Patient states taking only as needed 4/7/18   Historical Provider, MD   ACCU-CHEK CEDRICK PLUS strip TEST 4 TIMES DAILY 6/1/18   Historical Provider, MD   HYDROcodone-acetaminophen ([address]) 7.5-325 MG per tablet TAKE 1 TABLET BY MOUTH 3 TIMES A DAY AS NEEDED 5/23/18   Historical Provider, MD   metoprolol succinate (TOPROL XL) 25 MG extended release tablet Take 25 mg by mouth daily    Historical Provider, MD   insulin glargine (LANTUS) 100 UNIT/ML injection vial Inject 25 Units into the skin nightly 10/23/17   Carlita Eaton MD   aspirin EC 81 MG EC tablet Take 1 tablet by mouth daily 10/23/17   Carlita Eaton MD   gabapentin (NEURONTIN) 300 MG capsule Take 1 capsule by mouth nightly 10/23/17   Carlita Eaton MD   insulin lispro (HUMALOG) 100 UNIT/ML injection vial Inject 0-12 Units into the skin 3 times daily (with meals) 10/23/17   Carlita Eaton MD   Misc. Devices (ACAPELLA) MISC Take 1 Device by mouth as needed 9/2/15   Danica Duvall, APRN - CNP   fluticasone [facility]) 50 MCG/ACT nasal spray INHALE 2 SPRAYS IN [facility] NOSTRIL DAILY 11/25/13   Larwence Cushing, MD   cetirizine (ZYRTEC) 10 MG tablet Take 10 mg by mouth daily. Historical Provider, MD   etanercept (ENBREL) 50 MG/ML injection Inject 50 mg into the skin every 7 days. Historical Provider, MD   omeprazole (PRILOSEC) 40 MG capsule Take 40 mg by mouth daily     Historical Provider, MD   folic acid (FOLVITE) 1 MG tablet Take 1 mg by mouth daily. Historical Provider, MD       Allergies:  Atenolol    Social History:    TOBACCO:   reports that she quit smoking about 29 years ago. Her smoking use included cigarettes. She has a 20.00 pack-year smoking history. She has never used smokeless tobacco.  ETOH:   reports previous alcohol use. Family History:  Reviewed in detail and negative for DM, Early CAD, Cancer (except as below).  Positive as follows:        Problem Relation Age of Onset    Diabetes Mother     Hypertension Mother     Asthma Other     Heart Disease Brother     Diabetes Brother     Diabetes Sister     Heart Disease Brother     Cancer Neg Hx     Emphysema Neg Hx     Heart Failure Neg Hx        REVIEW OF SYSTEMS:   Pertinent positives/negatives as follows: fatigue, tired, MS change, and as discussed in HPI, otherwise a complete ROS performed and all other systems are negative  PHYSICAL EXAM PERFORMED:    BP (!) 142/66   Pulse 91   Temp 99.8 °F (37.7 °C) (Oral)   Resp 23   Ht 5' 10\" (1.778 m)   Wt 193 lb (87.5 kg)   SpO2 93%   BMI 27.69 kg/m²     GEN:  Somnolent but awakens to name, frequently falls back to sleep. NAD. HEENT:  NC/AT,EOMI, dry MM, no erythema/exudates or visible masses. CVS:  Normal S1,S2. RRR. Without M/G/R.   LUNG:   CTA-B. no wheezes, rales or rhonchi  ABD:  Soft, ND/NT, BS+ x4. Without G/R.  EXT: 2+ pulses, no c/c/e. Brisk cap refill. PSY:  Thought process somnolent, mildly confused. Affect somnolent. SARAH:  CN III-XII intact, moves all 4 spontaneously, sensory grossly intact. SKIN: No rash or lesions on visible skin. Chart review shows recent radiographs:  Ct Head Wo Contrast    Result Date: 6/28/2020  EXAMINATION: CT OF THE HEAD WITHOUT CONTRAST  6/28/2020 4:16 pm TECHNIQUE: CT of the head was performed without the administration of intravenous contrast. Dose modulation, iterative reconstruction, and/or weight based adjustment of the mA/kV was utilized to reduce the radiation dose to as low as reasonably achievable. COMPARISON: None. HISTORY: ORDERING SYSTEM PROVIDED HISTORY: altered mental status TECHNOLOGIST PROVIDED HISTORY: Has a \"code stroke\" or \"stroke alert\" been called? ->No Reason for exam:->altered mental status Reason for Exam: Fatigue (started yesterday, confusion per family FINDINGS: BRAIN/VENTRICLES: There is no acute intracranial hemorrhage, mass effect or midline shift. No abnormal extra-axial fluid collection. The gray-white differentiation is maintained without evidence of an acute infarct. There is no evidence of hydrocephalus. ORBITS: The visualized portion of the orbits demonstrate no acute abnormality. SINUSES: The visualized paranasal sinuses and mastoid air cells demonstrate no acute abnormality. SOFT TISSUES/SKULL:  No acute abnormality of the visualized skull or soft tissues. No acute intracranial abnormality.      Xr Chest Portable    Result Date: 6/28/2020  EXAMINATION: ONE XRAY VIEW expected to be hospitalized for 2 midnights based on the following assessment and plan:    ASSESSMENT/PLAN:   1. ISI, 1.5, IVF and repeat labs in the morning. Suspect prerenal causes as patient appears dehydrated. Baseline creatinine appears to be around 0.8-0.9. Hold home Mobic. 2. Acute encephalopathy, possibly multifactorial, polypharmacy/dehydration. Hold all home sedating medications for now including Norco, Flexeril, Neurontin/Lyrica. 3. DM2, poorly controlled, initial , hold oral Rx, chk A1c, add Mod SSI, continue home Lantus dose. 4. COVID rule out, droplet plus precautions follow-up COVID labs. 5. CAD, continue home aspirin and beta-blocker. 6. GERD, continue PPI. 7. HLD, continue home statin. 8. Rheumatoid arthritis, hold home regimen for now. DVT Prophylaxis: Lx  Diet: Carb control  Code Status: Full Code   PT/OT Eval Status: Will order if needed and as patient condition allows  Dispo - Admit to inpatient     Ramon Chritsine MD    Thank you Eugenia Hartley MD for the opportunity to be involved in this patient's care. If you have any questions or concerns please feel free to contact me via the [employer] Answering Service at [phone]. This chart was generated using the [address] [place] [address] [address] "[employer]ation system. I created this record but it may contain dictation errors given the limitations of this technology.

## 2020-06-29 NOTE — PROGRESS NOTES
PRN pain med given for pain level 8/10 located in mid lower back. Repositioned. Bed check. New blanket. Call light within reach.

## 2020-06-29 NOTE — PROGRESS NOTES
Physical Therapy/Occupational Therapy  Acknowledged PT/OT orders. Spoke with MD about pending COVID results. MD gave verbal orders to hold for now as he will attempt to ambulate patient to avoid excessive staff in/out of room. Continue to follow.     Caty Polanco, PT, DPT, OMT-C #[id number]  [facility] OTR/L [id number]

## 2020-06-29 NOTE — PROGRESS NOTES
Spoke with pt daughter getting answers to admission questions. Pt alert with times of confusion. Asked about Pt comfort. Pt asleep in room.

## 2020-06-29 NOTE — PROGRESS NOTES
Patient is not able to demonstrate the ability to move from a reclining position to an upright position within the recliner. Patient is confused, demented and /or unable to follow instruction.

## 2020-06-29 NOTE — PROGRESS NOTES
AM assessment complete. Gave am meds due see mar. Refused s/s r/t not eating breakfast.  A/O x 4. C/O dry heaves. PRN nausea med given at earlier time/not due. Notified Dr Isabella Kauffman. Order for carafate. Denies any needs. Bed check. Call light within reach.

## 2020-06-29 NOTE — PROGRESS NOTES
Admission questions and assessment complete; see flow sheet. Scheduled medications administered; See MAR. IV infusing without difficulty. Pt c/o chronic back pain 7/10 No medication given at this time due to pt unable to stay awake. Pt denies any needs at this time.  Pt educated on use of call light and to call out with needs, verbalized understanding, bed in low locked position for pt safety

## 2020-06-29 NOTE — PROGRESS NOTES
Handoff report and transfer of care given at bedside to [facility]. Patient in stable condition, denies needs/concerns at this time. Call light within reach.

## 2020-06-29 NOTE — PROGRESS NOTES
KNEE ARTHROSCOPY      left    KYPHOSIS SURGERY      LAMINECTOMY      MANDIBLE FRACTURE SURGERY      MANDIBLE FRACTURE SURGERY  02/2020    SEPTOPLASTY  5/7/2013    FESS with balloon    SPINAL FUSION      TUBAL LIGATION      UPPER GASTROINTESTINAL ENDOSCOPY  4/8/2014    Dilitation       Level of Consciousness: Alert, Follows Commands but Disoriented = 1    Level of Activity: Walking unassisted = 0    Respiratory Pattern: Regular Pattern; RR 8-20 = 0    Breath Sounds: Clear = 0    Sputum   ,  ,    Cough: Strong, spontaneous, non-productive = 0    Vital Signs   BP (!) 142/66   Pulse 91   Temp 99.8 °F (37.7 °C) (Oral)   Resp 23   Ht 5' 10\" (1.778 m)   Wt 193 lb (87.5 kg)   SpO2 93%   BMI 27.69 kg/m²   SPO2 (COPD values may differ): 90-91% on room air or = 0    Peak Flow (asthma only): not applicable = 0    RSI: 0-4 = See once and convert to home regimen or discontinue        Plan       Goals: medication delivery    Patient/caregiver was educated on the proper method of use for Respiratory Care Devices:  No:       Level of patient/caregiver understanding able to:   ? Verbalize understanding   ? Demonstrate understanding       ? Teach back        ? Needs reinforcement       ? No available caregiver               ? Other:     Response to education:  N/A     Is patient being placed on Home Treatment Regimen? Yes     Does the patient have everything they need prior to discharge? NA     Comments: Patient chart reviewed, patient assessed. Plan of Care: continue patient on PRN home use    Electronically signed by Calin Long RCP on 6/28/2020 at 10:39 PM    Respiratory Protocol Guidelines     1. Assessment and treatment by Respiratory Therapy will be initiated for medication and therapeutic interventions upon initiation of aerosolized medication. 2. Physician will be contacted for respiratory rate (RR) greater than 35 breaths per minute.  Therapy will be held for heart rate (HR) greater than 140 beats per minute, pending direction from physician. 3. Bronchodilators will be administered via Metered Dose Inhaler (MDI) with spacer when the following criteria are met:  a. Alert and cooperative     b. HR < 140 bpm  c. RR < 30 bpm                d. Can demonstrate a 2-3 second inspiratory hold  4. Bronchodilators will be administered via Hand Held Nebulizer [facility]) to patients when ANY of the following criteria are met  a. Incognizant or uncooperative          b. Patients treated with HHN at Home        c. Unable to demonstrate proper use of MDI with spacer     d. RR > 30 bpm   5. Bronchodilators will be delivered via Metered Dose Inhaler (MDI), HHN, Aerogen to intubated patients on mechanical ventilation. 6. Inhalation medication orders will be delivered and/or substituted as outlined below. Aerosolized Medications Ordering and Administration Guidelines:    1. All Medications will be ordered by a physician, and their frequency and/or modality will be adjusted as defined by the patients Respiratory Severity Index (RSI) score. 2. If the patient does not have documented COPD, consider discontinuing anticholinergics when RSI is less than 9.  3. If the bronchospasm worsens (increased RSI), then the bronchodilator frequency can be increased to a maximum of every 4 hours. If greater than every 4 hours is required, the physician will be contacted. 4. If the bronchospasm improves, the frequency of the bronchodilator can be decreased, based on the patient's RSI, but not less than home treatment regimen frequency. 5. Bronchodilator(s) will be discontinued if patient has a RSI less than 9 and has received no scheduled or as needed treatment for 72  Hrs. Patients Ordered on a Mucolytic Agent:    1. Must always be administered with a bronchodilator.     2. Discontinue if patient experiences worsened bronchospasm, or secretions have lessened to the point that the patient is able to clear them with a cough.    Anti-inflammatory and Combination Medications:    1. If the patient lacks prior history of lung disease, is not using inhaled anti-inflammatory medication at home, and lacks wheezing by examination or by history for at least 24 hours, contact physician for possible discontinuation.

## 2020-06-30 ENCOUNTER — APPOINTMENT (OUTPATIENT)
Dept: VASCULAR LAB | Age: 70
DRG: 682 | End: 2020-06-30
Payer: MEDICARE

## 2020-06-30 LAB
D DIMER: >5250 NG/ML DDU (ref 0–229)
GLUCOSE BLD-MCNC: 125 MG/DL (ref 70–99)
GLUCOSE BLD-MCNC: 137 MG/DL (ref 70–99)
GLUCOSE BLD-MCNC: 185 MG/DL (ref 70–99)
GLUCOSE BLD-MCNC: 209 MG/DL (ref 70–99)
GLUCOSE BLD-MCNC: 350 MG/DL (ref 70–99)
PERFORMED ON: ABNORMAL

## 2020-06-30 PROCEDURE — 99232 SBSQ HOSP IP/OBS MODERATE 35: CPT | Performed by: INTERNAL MEDICINE

## 2020-06-30 PROCEDURE — 97535 SELF CARE MNGMENT TRAINING: CPT

## 2020-06-30 PROCEDURE — 6370000000 HC RX 637 (ALT 250 FOR IP): Performed by: INTERNAL MEDICINE

## 2020-06-30 PROCEDURE — 96372 THER/PROPH/DIAG INJ SC/IM: CPT

## 2020-06-30 PROCEDURE — 2580000003 HC RX 258: Performed by: INTERNAL MEDICINE

## 2020-06-30 PROCEDURE — 36415 COLL VENOUS BLD VENIPUNCTURE: CPT

## 2020-06-30 PROCEDURE — 97530 THERAPEUTIC ACTIVITIES: CPT

## 2020-06-30 PROCEDURE — G0378 HOSPITAL OBSERVATION PER HR: HCPCS

## 2020-06-30 PROCEDURE — 93970 EXTREMITY STUDY: CPT

## 2020-06-30 PROCEDURE — 6360000002 HC RX W HCPCS: Performed by: INTERNAL MEDICINE

## 2020-06-30 PROCEDURE — 97162 PT EVAL MOD COMPLEX 30 MIN: CPT

## 2020-06-30 PROCEDURE — 1200000000 HC SEMI PRIVATE

## 2020-06-30 PROCEDURE — 97166 OT EVAL MOD COMPLEX 45 MIN: CPT

## 2020-06-30 PROCEDURE — 97116 GAIT TRAINING THERAPY: CPT

## 2020-06-30 PROCEDURE — 96376 TX/PRO/DX INJ SAME DRUG ADON: CPT

## 2020-06-30 PROCEDURE — 85379 FIBRIN DEGRADATION QUANT: CPT

## 2020-06-30 RX ORDER — INSULIN GLARGINE 100 [IU]/ML
28 INJECTION, SOLUTION SUBCUTANEOUS NIGHTLY
Status: DISCONTINUED | OUTPATIENT
Start: 2020-06-30 | End: 2020-07-01 | Stop reason: HOSPADM

## 2020-06-30 RX ORDER — PREGABALIN 25 MG/1
75 CAPSULE ORAL 2 TIMES DAILY
Status: DISCONTINUED | OUTPATIENT
Start: 2020-06-30 | End: 2020-07-01

## 2020-06-30 RX ORDER — CAPSAICIN 0.07 G/100G
CREAM TOPICAL 3 TIMES DAILY
Status: DISCONTINUED | OUTPATIENT
Start: 2020-06-30 | End: 2020-07-01 | Stop reason: HOSPADM

## 2020-06-30 RX ORDER — SPIRONOLACTONE 25 MG/1
25 TABLET ORAL DAILY
Status: DISCONTINUED | OUTPATIENT
Start: 2020-06-30 | End: 2020-07-01 | Stop reason: HOSPADM

## 2020-06-30 RX ADMIN — PANTOPRAZOLE SODIUM 40 MG: 40 TABLET, DELAYED RELEASE ORAL at 06:26

## 2020-06-30 RX ADMIN — SUCRALFATE 1 G: 1 TABLET ORAL at 06:26

## 2020-06-30 RX ADMIN — Medication 10 ML: at 22:02

## 2020-06-30 RX ADMIN — INSULIN GLARGINE 28 UNITS: 100 INJECTION, SOLUTION SUBCUTANEOUS at 22:03

## 2020-06-30 RX ADMIN — ATORVASTATIN CALCIUM 40 MG: 40 TABLET, FILM COATED ORAL at 22:02

## 2020-06-30 RX ADMIN — SUCRALFATE 1 G: 1 TABLET ORAL at 11:44

## 2020-06-30 RX ADMIN — ONDANSETRON HYDROCHLORIDE 4 MG: 2 INJECTION, SOLUTION INTRAMUSCULAR; INTRAVENOUS at 08:35

## 2020-06-30 RX ADMIN — PREGABALIN 75 MG: 25 CAPSULE ORAL at 22:02

## 2020-06-30 RX ADMIN — MORPHINE SULFATE 2 MG: 2 INJECTION, SOLUTION INTRAMUSCULAR; INTRAVENOUS at 08:35

## 2020-06-30 RX ADMIN — ENOXAPARIN SODIUM 40 MG: 40 INJECTION SUBCUTANEOUS at 10:14

## 2020-06-30 RX ADMIN — ONDANSETRON HYDROCHLORIDE 4 MG: 2 INJECTION, SOLUTION INTRAMUSCULAR; INTRAVENOUS at 16:06

## 2020-06-30 RX ADMIN — SPIRONOLACTONE 25 MG: 25 TABLET ORAL at 10:14

## 2020-06-30 RX ADMIN — CAPSAICIN: 0.75 CREAM TOPICAL at 22:02

## 2020-06-30 RX ADMIN — INSULIN LISPRO 10 UNITS: 100 INJECTION, SOLUTION INTRAVENOUS; SUBCUTANEOUS at 11:44

## 2020-06-30 RX ADMIN — SUCRALFATE 1 G: 1 TABLET ORAL at 17:44

## 2020-06-30 RX ADMIN — SODIUM CHLORIDE: 9 INJECTION, SOLUTION INTRAVENOUS at 17:47

## 2020-06-30 RX ADMIN — SUCRALFATE 1 G: 1 TABLET ORAL at 23:30

## 2020-06-30 RX ADMIN — HYDROCODONE BITARTRATE AND ACETAMINOPHEN 1 TABLET: 5; 325 TABLET ORAL at 16:05

## 2020-06-30 RX ADMIN — HYDROCODONE BITARTRATE AND ACETAMINOPHEN 1 TABLET: 5; 325 TABLET ORAL at 23:30

## 2020-06-30 ASSESSMENT — PAIN DESCRIPTION - DESCRIPTORS
DESCRIPTORS: ACHING;DISCOMFORT
DESCRIPTORS: ACHING;DISCOMFORT;CONSTANT
DESCRIPTORS: DISCOMFORT
DESCRIPTORS: ACHING;DISCOMFORT

## 2020-06-30 ASSESSMENT — PAIN DESCRIPTION - PROGRESSION
CLINICAL_PROGRESSION: NOT CHANGED

## 2020-06-30 ASSESSMENT — PAIN DESCRIPTION - FREQUENCY
FREQUENCY: CONTINUOUS

## 2020-06-30 ASSESSMENT — PAIN DESCRIPTION - ONSET
ONSET: ON-GOING

## 2020-06-30 ASSESSMENT — PAIN DESCRIPTION - LOCATION
LOCATION: BACK
LOCATION: NECK
LOCATION: BACK
LOCATION: BACK

## 2020-06-30 ASSESSMENT — PAIN - FUNCTIONAL ASSESSMENT
PAIN_FUNCTIONAL_ASSESSMENT: PREVENTS OR INTERFERES SOME ACTIVE ACTIVITIES AND ADLS

## 2020-06-30 ASSESSMENT — PAIN SCALES - GENERAL
PAINLEVEL_OUTOF10: 6
PAINLEVEL_OUTOF10: 4
PAINLEVEL_OUTOF10: 10
PAINLEVEL_OUTOF10: 10
PAINLEVEL_OUTOF10: 7
PAINLEVEL_OUTOF10: 10

## 2020-06-30 ASSESSMENT — PAIN DESCRIPTION - ORIENTATION
ORIENTATION: MID;LOWER
ORIENTATION: RIGHT

## 2020-06-30 ASSESSMENT — PAIN DESCRIPTION - PAIN TYPE
TYPE: CHRONIC PAIN

## 2020-06-30 ASSESSMENT — PAIN DESCRIPTION - DIRECTION: RADIATING_TOWARDS: RT NECK

## 2020-06-30 NOTE — PROGRESS NOTES
Bedside report given and pt care transferred to [facility]. Pt denies needs at this time. Call light within reach.

## 2020-06-30 NOTE — PLAN OF CARE
Problem: Falls - Risk of:  Goal: Will remain free from falls  Description: Will remain free from falls  6/30/2020 0451 by Marshal Fink RN  Outcome: Ongoing  6/30/2020 0451 by Marshal Fink RN  Outcome: Ongoing  Goal: Absence of physical injury  Description: Absence of physical injury  6/30/2020 0451 by Marshal Fink RN  Outcome: Ongoing  6/30/2020 0451 by Marshal Fink RN  Outcome: Ongoing     Problem: SAFETY  Goal: Free from accidental physical injury  6/30/2020 0451 by Marshal Fink RN  Outcome: Ongoing  6/30/2020 0451 by Marshal Fink RN  Outcome: Ongoing  Goal: Free from intentional harm  6/30/2020 0451 by Marshal Fink RN  Outcome: Ongoing  6/30/2020 0451 by Marshal Fink RN  Outcome: Ongoing     Problem: DAILY CARE  Goal: Daily care needs are met  6/30/2020 0451 by Marshal Fink RN  Outcome: Ongoing  6/30/2020 0451 by Marshal Fink RN  Outcome: Ongoing     Problem: PAIN  Goal: Patient's pain/discomfort is manageable  6/30/2020 0451 by Marshal Fink RN  Outcome: Ongoing  6/30/2020 0451 by Marshal Fink RN  Outcome: Ongoing     Problem: SKIN INTEGRITY  Goal: Skin integrity is maintained or improved  6/30/2020 0451 by Marshal Fink RN  Outcome: Ongoing  6/30/2020 0451 by Marshal Fink RN  Outcome: Ongoing     Problem: KNOWLEDGE DEFICIT  Goal: Patient/S.O. demonstrates understanding of disease process, treatment plan, medications, and discharge instructions.   6/30/2020 0451 by Marshal Fink RN  Outcome: Ongoing  6/30/2020 0451 by Marshal Fink RN  Outcome: Ongoing     Problem: DISCHARGE BARRIERS  Goal: Patient's continuum of care needs are met  6/30/2020 0451 by Marshal Fink RN  Outcome: Ongoing  6/30/2020 0451 by Marshal Fink RN  Outcome: Ongoing     Problem: Mental Status - Impaired:  Goal: Mental status will improve  Description: Mental status will improve  6/30/2020 0451 by Marshal Fink RN  Outcome: Ongoing  6/30/2020 0451 by Marshal Fink RN  Outcome: Ongoing     Problem: Pain:  Description: Pain management should include both nonpharmacologic and pharmacologic interventions.   Goal: Pain level will decrease  Description: Pain level will decrease  6/30/2020 0451 by Krysten Adams RN  Outcome: Ongoing  6/30/2020 0451 by Krysten Adams RN  Outcome: Ongoing  Goal: Control of acute pain  Description: Control of acute pain  6/30/2020 0451 by Krysten Adams RN  Outcome: Ongoing  6/30/2020 0451 by Krysten Adams RN  Outcome: Ongoing  Goal: Control of chronic pain  Description: Control of chronic pain  6/30/2020 0451 by Krysten Adams RN  Outcome: Ongoing  6/30/2020 0451 by Krysten Adams RN  Outcome: Ongoing

## 2020-06-30 NOTE — PLAN OF CARE
Problem: Falls - Risk of:  Goal: Will remain free from falls  Description: Will remain free from falls  6/30/2020 1033 by Marivel Waite RN  Outcome: Ongoing  6/30/2020 0451 by Rashad Li RN  Outcome: Ongoing  6/30/2020 0451 by Rashad Li RN  Outcome: Ongoing  Goal: Absence of physical injury  Description: Absence of physical injury  6/30/2020 1033 by Marivel Waite RN  Outcome: Ongoing  6/30/2020 0451 by Rashad Li RN  Outcome: Ongoing  6/30/2020 0451 by Rashad Li RN  Outcome: Ongoing     Problem: SAFETY  Goal: Free from accidental physical injury  6/30/2020 1033 by Marivel Waite RN  Outcome: Ongoing  6/30/2020 0451 by Rashad Li RN  Outcome: Ongoing  6/30/2020 0451 by Rashad Li RN  Outcome: Ongoing  Goal: Free from intentional harm  6/30/2020 1033 by Marivel Waite RN  Outcome: Ongoing  6/30/2020 0451 by Rashad Li RN  Outcome: Ongoing  6/30/2020 0451 by Rashad Li RN  Outcome: Ongoing

## 2020-06-30 NOTE — PROGRESS NOTES
Inpatient Occupational Therapy  Evaluation and Treatment    Unit: [facility]  Date:  6/30/2020  Patient Name:    Zackery Argueta  Admitting diagnosis:  Acute encephalopathy [G93.40]  Admit Date:  6/28/2020  Precautions/Restrictions/WB Status/ Lines/ Wounds/ Oxygen: Fall risk, Bed/chair alarm and Lines -IV  cpap  Treatment Time:  9273-3201  Treatment Number: 1   Timed code treatment minutes 40 minutes   Total Treatment minutes:   50   minutes    Patient Goals for Therapy:  \" go  Home  \"      Discharge Recommendations: SNF  DME needs for discharge: defer to facility       Therapy recommendations for staff:   Assist of 1 with use of rolling walker (RW) for all transfers to/from [facility]    History of Present Illness:    77 y.o. female presents to the ED with confusion and fatigue. The patient has seemed confused to her family since yesterday, she has had her Lyrica doubled in the past week secondary to postherpetic neuralgia, also has been taking Benadryl to help with itching of the former varicella lesions shingle lesions. She has had no fever, has not been eating quite as well, said no nausea or vomiting or headache. She has had no numbness, tingling or weakness in the arms of the legs. She has had no abdominal pain. She has chronic back pain for which she takes Percocet. Home Health S4 Level Recommendation:  NA  AM-PAC Score:      Preadmission Environment    Pt. Lives with family (grand daughter ) grand dtr is 16   Home environment:  one story home one step in the house from living room to kitchen   Steps to enter first floor: No steps  Steps to second floor: N/A  Bathroom: walk in shower, comfort height toilet, grab bars and shower seat  Equipment owned: RW, rollator, BSC, [facility], reacher and raised toilet    Preadmission Status:  Pt. Able to drive: Yes- not for about a month   Pt Fully independent with ADLs: Yes  Pt.  Required assistance from family for: Cleaning, Cooking and Laundry Pt and grand dtr did the housework  Pt. independent for transfers and gait and walked with RW  History of falls Yes  pt propels self in the house with WC and can ambulate very short distances with RW- per chart    Pain  Yes  Rating:moderate  Location:cervical area   Pain Medicine Status: No request made      Cognition    A&O Person and Place Pt stated she was at [facility] to follow 2 step commands    Subjective  Patient supine in bed with no family present. Pt agreeable to this OT eval & tx. Upper Extremity ROM:    WFL    Upper Extremity Strength:    WFL  Upper Extremity Sensation    WFL    Upper Extremity Proprioception:  WFL    Coordination and Tone  WFL    Balance  Functional Sitting Balance:  WFL  Functional Standing Balance:Impaired    Bed mobility:    Supine to sit:   CGA  Sit to supine:   Not Tested  Rolling:    Not Tested  Scooting in sitting:  CGA  Scooting to head of bed:   Not Tested    Bridging:   Not Tested    Transfers:    Sit to stand:  CGA with VC for hand placement   Stand to sit:  CGA  Bed to chair:   CGA and RW  Standard toilet: Not Tested  Bed to BSC:  CGA and RW    Dressing:      UE:   Not Tested  LE:    Mod A     Bathing:    UE:  Not Tested  LE:  Not Tested    Eating:   Independent    Toileting:  Min A     Activity Tolerance   Pt completed therapy session with SOB noted with movement and nausea   /85 Sp02 98   Positioning Needs:   Pt reclined in chair, alarm set, positioned in proper neutral alignment and pressure relief provided. Discussed with pt's nurse, pt cleared to sit up in recliner chair. Pt able to recline in chair IND     Exercise / Activities Initiated:   N/A    Patient/Family Education:   Role of OT    Assessment of Deficits: Pt seen for Occupational therapy evaluation in acute care setting. Pt demonstrated decreased Activity tolerance, ADLs, Balance , Bed mobility, Safety Awareness and Transfers.  Pt functioning below baseline and will likely benefit from skilled occupational

## 2020-06-30 NOTE — PROGRESS NOTES
IM Progress Note    Admit Date:  6/28/2020  2    Interval history:  Admitted for increasing confusion, recently increased on lyrica for shingles      Subjective:    Ms. Zo Aggarwal seen up in bed , confusion resolved, nausea better  Tolerated some breakfast      Objective:   BP (!) 147/81   Pulse 106   Temp 98 °F (36.7 °C) (Oral)   Resp 16   Ht 5' 10\" (1.778 m)   Wt 179 lb 11.2 oz (81.5 kg)   SpO2 96%   BMI 25.78 kg/m²       Intake/Output Summary (Last 24 hours) at 6/30/2020 0741  Last data filed at 6/29/2020 2247  Gross per 24 hour   Intake 780 ml   Output 550 ml   Net 230 ml       Physical Exam:        General: elderly female up in bed,   Awake, alert and oriented. Appears to be not in any distress  Mucous Membranes:  Pink , anicteric  Healed right neck  crusted shingles with scarring   Neck: No JVD, no carotid bruit, no thyromegaly  Chest:  Clear to auscultation bilaterally, no added sounds  Cardiovascular:  RRR S1S2 heard, no murmurs or gallops  Abdomen:  Soft, undistended, non tender, no organomegaly, BS present  Extremities: chronic 2 + smith LE edema.  Distal pulses well felt  Neurological : grossly normal with more energetic today         Medications:   Scheduled Medications:    sucralfate  1 g Oral 4 times per day    pregabalin  50 mg Oral BID    predniSONE  4 mg Oral Daily    aspirin EC  81 mg Oral Daily    atorvastatin  40 mg Oral Nightly    azithromycin  250 mg Oral Daily    folic acid  1 mg Oral Daily    insulin glargine  25 Units Subcutaneous Nightly    ipratropium  2 spray Each Nostril TID    metoprolol succinate  25 mg Oral Daily    pantoprazole  40 mg Oral QAM AC    Roflumilast  500 mcg Oral Daily    sodium chloride flush  10 mL Intravenous 2 times per day    enoxaparin  40 mg Subcutaneous Daily    insulin lispro  0-12 Units Subcutaneous TID WC    insulin lispro  0-6 Units Subcutaneous Nightly     I   sodium chloride 75 mL/hr at 06/29/20 2340    dextrose       HYDROcodone 5 mg -

## 2020-06-30 NOTE — PROGRESS NOTES
PRN pain med given for pain level 10/10 located lower back see mar/pain flowsheet. Up in chair. Call light within reach.

## 2020-06-30 NOTE — FLOWSHEET NOTE
06/30/20 1038   Encounter Summary   Services provided to: Patient not available   Referral/Consult From: [id number] members   Continue Visiting   (6/30/Phone support.  No answer.)

## 2020-06-30 NOTE — PROGRESS NOTES
Pt trying to get out of bed at this time. Placed Tele-sitter in room for pt safety due to keeping door shut for droplet plus isolation.

## 2020-07-01 VITALS
SYSTOLIC BLOOD PRESSURE: 132 MMHG | WEIGHT: 180.7 LBS | TEMPERATURE: 97.3 F | HEIGHT: 70 IN | HEART RATE: 100 BPM | OXYGEN SATURATION: 97 % | RESPIRATION RATE: 18 BRPM | BODY MASS INDEX: 25.87 KG/M2 | DIASTOLIC BLOOD PRESSURE: 76 MMHG

## 2020-07-01 PROBLEM — A41.9 SEPSIS (HCC): Status: RESOLVED | Noted: 2017-10-19 | Resolved: 2020-07-01

## 2020-07-01 PROBLEM — R07.9 CHEST PAIN: Status: RESOLVED | Noted: 2017-10-18 | Resolved: 2020-07-01

## 2020-07-01 PROBLEM — N17.9 AKI (ACUTE KIDNEY INJURY) (HCC): Status: RESOLVED | Noted: 2019-07-06 | Resolved: 2020-07-01

## 2020-07-01 LAB
GLUCOSE BLD-MCNC: 105 MG/DL (ref 70–99)
GLUCOSE BLD-MCNC: 202 MG/DL (ref 70–99)
GLUCOSE BLD-MCNC: 69 MG/DL (ref 70–99)
GLUCOSE BLD-MCNC: 93 MG/DL (ref 70–99)
PERFORMED ON: ABNORMAL
PERFORMED ON: NORMAL

## 2020-07-01 PROCEDURE — 6370000000 HC RX 637 (ALT 250 FOR IP): Performed by: INTERNAL MEDICINE

## 2020-07-01 PROCEDURE — 99239 HOSP IP/OBS DSCHRG MGMT >30: CPT | Performed by: INTERNAL MEDICINE

## 2020-07-01 PROCEDURE — G0378 HOSPITAL OBSERVATION PER HR: HCPCS

## 2020-07-01 PROCEDURE — 2580000003 HC RX 258: Performed by: INTERNAL MEDICINE

## 2020-07-01 PROCEDURE — 6360000002 HC RX W HCPCS: Performed by: INTERNAL MEDICINE

## 2020-07-01 PROCEDURE — 96372 THER/PROPH/DIAG INJ SC/IM: CPT

## 2020-07-01 RX ORDER — METOLAZONE 5 MG/1
5 TABLET ORAL EVERY OTHER DAY
Qty: 30 TABLET | Refills: 0
Start: 2020-07-01 | End: 2020-12-18

## 2020-07-01 RX ORDER — PREGABALIN 75 MG/1
75 CAPSULE ORAL 2 TIMES DAILY
Qty: 60 CAPSULE | Refills: 3
Start: 2020-07-01 | End: 2020-11-19 | Stop reason: CLARIF

## 2020-07-01 RX ORDER — PREDNISONE 1 MG/1
4 TABLET ORAL DAILY
Qty: 90 TABLET | Refills: 0
Start: 2020-07-01 | End: 2020-09-29

## 2020-07-01 RX ORDER — PREGABALIN 100 MG/1
100 CAPSULE ORAL 2 TIMES DAILY
Status: DISCONTINUED | OUTPATIENT
Start: 2020-07-01 | End: 2020-07-01 | Stop reason: HOSPADM

## 2020-07-01 RX ADMIN — SUCRALFATE 1 G: 1 TABLET ORAL at 05:16

## 2020-07-01 RX ADMIN — HYDROCODONE BITARTRATE AND ACETAMINOPHEN 1 TABLET: 5; 325 TABLET ORAL at 05:36

## 2020-07-01 RX ADMIN — ENOXAPARIN SODIUM 40 MG: 40 INJECTION SUBCUTANEOUS at 09:11

## 2020-07-01 RX ADMIN — METOPROLOL SUCCINATE 25 MG: 25 TABLET, EXTENDED RELEASE ORAL at 09:10

## 2020-07-01 RX ADMIN — ASPIRIN 81 MG: 81 TABLET, COATED ORAL at 09:09

## 2020-07-01 RX ADMIN — SPIRONOLACTONE 25 MG: 25 TABLET ORAL at 09:10

## 2020-07-01 RX ADMIN — ROFLUMILAST 500 MCG: 500 TABLET ORAL at 09:26

## 2020-07-01 RX ADMIN — SODIUM CHLORIDE: 9 INJECTION, SOLUTION INTRAVENOUS at 05:36

## 2020-07-01 RX ADMIN — PANTOPRAZOLE SODIUM 40 MG: 40 TABLET, DELAYED RELEASE ORAL at 05:16

## 2020-07-01 RX ADMIN — PREGABALIN 100 MG: 100 CAPSULE ORAL at 09:09

## 2020-07-01 RX ADMIN — Medication 10 ML: at 09:13

## 2020-07-01 RX ADMIN — CAPSAICIN: 0.75 CREAM TOPICAL at 09:12

## 2020-07-01 RX ADMIN — FOLIC ACID 1 MG: 1 TABLET ORAL at 09:10

## 2020-07-01 RX ADMIN — AZITHROMYCIN MONOHYDRATE 250 MG: 250 TABLET ORAL at 09:10

## 2020-07-01 RX ADMIN — PREDNISONE 4 MG: 1 TABLET ORAL at 09:09

## 2020-07-01 ASSESSMENT — PAIN SCALES - GENERAL
PAINLEVEL_OUTOF10: 0
PAINLEVEL_OUTOF10: 9
PAINLEVEL_OUTOF10: 10
PAINLEVEL_OUTOF10: 0

## 2020-07-01 ASSESSMENT — PAIN DESCRIPTION - FREQUENCY: FREQUENCY: CONTINUOUS

## 2020-07-01 ASSESSMENT — PAIN DESCRIPTION - LOCATION: LOCATION: NECK

## 2020-07-01 ASSESSMENT — PAIN DESCRIPTION - PAIN TYPE: TYPE: CHRONIC PAIN

## 2020-07-01 ASSESSMENT — PAIN - FUNCTIONAL ASSESSMENT: PAIN_FUNCTIONAL_ASSESSMENT: PREVENTS OR INTERFERES SOME ACTIVE ACTIVITIES AND ADLS

## 2020-07-01 ASSESSMENT — PAIN DESCRIPTION - DESCRIPTORS: DESCRIPTORS: ACHING;CONSTANT

## 2020-07-01 ASSESSMENT — PAIN DESCRIPTION - ONSET: ONSET: ON-GOING

## 2020-07-01 NOTE — DISCHARGE SUMMARY
Name:  Lola Elliott  Room:  0213/0213-02  MRN:    [id number]    Discharge Summary      This discharge summary is in conjunction with a complete physical exam done on the day of discharge. Discharging Physician: Dr. Garcia [employer]: 6/28/2020  Discharge:  7/1/2020    HPI taken from admission H&P:      The patient is a 77 y.o. female with PMH below, presents with MS change/confusion, lethargy, fatigue, poor PO intake, chronic cough. Much of the info contained herein was obtained from the EMR and the ED physician/staff. ED physician reportedly discussed pt condition w/ the pts daughter in the ER. The above sx have been progressive over the last few days, especially, the last 24 hours. The pt just says she feels very tired. She is not able to give much helpful hx 2/2 being somnolent. Reportedly, her Lyrica was doubled in the past week 2/2 post shingles neuralgia. She is reportedly been taking Benadryl for itching. She is also on  which could be contributing: Norco 7.5. Diagnoses this Admission and Hospital Course   ISI, 1.5     - Suspect prerenal causes as patient appears dehydrated. - Baseline creatinine appears to be around 0.8-0.9.    - Hold home Mobic ane metolazone . - Given IVF and resolved now. - Resumed aldactone today - can resume metolozone as needed    Acute encephalopathy  - possibly multifactorial- metabolic and also  polypharmacy/dehydration  - Improved with holding all meds including Norco, Flexeril, Lyrica. - Resumed meds at lower dose . - Avoid high doses of lyrica in future    DM2  - poorly controlled  - initial   - hold oral Rx  - chk A1c: 8.2   - add Mod SSI, continue home Lantus dose  - Improved sugars     Herpetic neuralgia   - of right cervical region   - severe pain  - on lyrica, norco .   - Recommend cervical RHEA with her pain mx for better pain control.      COVID ruled out  - Off droplet plus precautions    CAD  - continue home aspirin and beta-blocker. GERD  - continue PPI. HLD  - continue home statin. Rheumatoid arthritis  - holding enbrel for recent shingles and . - Resumes prednisone 4 mg     COPD /tracheomalacia  - stable  - F/w Dr. Christy Salcedo . - On daily azithromycin.   - Resume cpap at night time. Pedal edema smith  - chronic .   - Ruled out dvt- dopplers neg   - Resumed aldactone     Stage 2 sacral ulcer  - POA  - wound care consulted    Procedures (Please Review Full Report for Details)  None     Consults    None     Physical Exam at Discharge:    /76   Pulse 100   Temp 97.3 °F (36.3 °C) (Oral)   Resp 18   Ht 5' 10\" (1.778 m)   Wt 180 lb 11.2 oz (82 kg)   SpO2 97%   BMI 25.93 kg/m²   General: elderly female up in bed,   Awake, alert and oriented. Appears to be not in any distress  Mucous Membranes:  Pink , anicteric  Healed right neck  crusted shingles with scarring   Neck: No JVD, no carotid bruit, no thyromegaly  Chest:  Clear to auscultation bilaterally, no added sounds  Cardiovascular:  RRR S1S2 heard, no murmurs or gallops  Abdomen:  Soft, undistended, non tender, no organomegaly, BS present  Extremities: chronic 2 + smith LE edema.  Distal pulses well felt  Neurological : grossly normal with more energetic today     CBC:   Recent Labs     06/28/20  1435 06/29/20  0525   WBC 11.0 9.5   HGB 12.5 11.7*   HCT 37.7 35.0*   MCV 93.3 93.1    167     BMP:   Recent Labs     06/28/20  1435 06/29/20  0525   * 130*   K 3.8 3.4*   CL 85* 93*   CO2 28 24   BUN 30* 18   CREATININE 1.5* 0.9     LIVER PROFILE:   Recent Labs     06/28/20  1435 06/29/20  0525   AST 17 19   ALT 11 10   BILITOT 0.7 1.2*   ALKPHOS 191* 138*     PT/INR:   Recent Labs     06/28/20  2220   PROTIME 12.5   INR 1.08     APTT:   Recent Labs     06/28/20  2220   APTT 25.4     UA:  Recent Labs     06/28/20  1844   COLORU Yellow   PHUR 6.5   WBCUA 0-2   RBCUA 3-4   BACTERIA 1+*   CLARITYU Clear   SPECGRAV 1.010   LEUKOCYTESUR Negative   UROBILINOGEN 0.2 BILIRUBINUR Negative   BLOODU SMALL*   GLUCOSEU >=1000*     CULTURES  COVID-19: not detected     RADIOLOGY  VL Extremity Venous Bilateral   Final Result      XR CHEST PORTABLE   Final Result   No acute process. CT Head WO Contrast   Final Result   No acute intracranial abnormality. Discharge Medications     Medication List      START taking these medications    predniSONE 1 MG tablet  Commonly known as:  DELTASONE  Take 4 tablets by mouth daily     pregabalin 75 MG capsule  Commonly known as:  Lyrica  Take 1 capsule by mouth 2 times daily for 30 days. CHANGE how you take these medications    metOLazone 5 MG tablet  Commonly known as:  ZAROXOLYN  Take 1 tablet by mouth every other day  What changed:  See the new instructions.         CONTINUE taking these medications    Acapella Misc  Take 1 Device by mouth as needed     Accu-Chek Zaira Plus strip  Generic drug:  blood glucose test strips     albuterol sulfate  (90 Base) MCG/ACT inhaler  INHALE 2 PUFFS INTO THE LUNGS EVERY 4 HOURS AS NEEDED FOR WHEEZING     aspirin EC 81 MG EC tablet     atorvastatin 40 MG tablet  Commonly known as:  LIPITOR     azithromycin 250 MG tablet  Commonly known as:  ZITHROMAX  TAKE 1 TABLET BY MOUTH EVERY DAY     calcium carbonate 500 MG Tabs tablet  Commonly known as:  OSCAL     cetirizine 10 MG tablet  Commonly known as:  ZYRTEC     cyclobenzaprine 10 MG tablet  Commonly known as:  FLEXERIL     Enbrel 50 MG/ML injection  Generic drug:  etanercept     fluticasone 50 MCG/ACT nasal spray  Commonly known as:  FLONASE  INHALE 2 SPRAYS IN EACH NOSTRIL DAILY     folic acid 1 MG tablet  Commonly known as:  FOLVITE     HYDROcodone-acetaminophen 7.5-325 MG per tablet  Commonly known as:  NORCO     insulin lispro 100 UNIT/ML injection vial  Commonly known as:  HUMALOG     Insulin Syringe-Needle U-100 31G X 5/16\" 0.5 ML Misc     ipratropium 0.06 % nasal spray  Commonly known as:  ATROVENT  2 SPRAYS BY NASAL ROUTE 2-4 TIMES DAILY     Lantus 100 UNIT/ML injection vial  Generic drug:  insulin glargine     meloxicam 15 MG tablet  Commonly known as:  MOBIC     metoprolol succinate 25 MG extended release tablet  Commonly known as:  TOPROL XL     omeprazole 40 MG delayed release capsule  Commonly known as:  PRILOSEC     Roflumilast 500 MCG tablet  Commonly known as:  Daliresp  Take 1 tablet by mouth daily     spironolactone 50 MG tablet  Commonly known as:  Aldactone  Take 0.5 tablets by mouth daily     vitamin D 1000 UNIT Tabs tablet  Commonly known as:  CHOLECALCIFEROL        STOP taking these medications    Forteo 600 MCG/2.4ML Soln injection  Generic drug:  teriparatide (recombinant)     gabapentin 300 MG capsule  Commonly known as:  NEURONTIN           Where to Get Your Medications      Information about where to get these medications is not yet available    Ask your nurse or doctor about these medications  · metOLazone 5 MG tablet  · predniSONE 1 MG tablet  · pregabalin 75 MG capsule       Discharged in stable condition to home    Follow Up:   Follow up with PCP

## 2020-07-01 NOTE — PLAN OF CARE
Problem: Falls - Risk of:  Goal: Will remain free from falls  Description: Will remain free from falls  6/30/2020 2257 by Som Tamayo  Outcome: Ongoing  6/30/2020 2256 by Som Tamayo  Outcome: Ongoing  6/30/2020 1033 by Katia Dao RN  Outcome: Ongoing  Goal: Absence of physical injury  Description: Absence of physical injury  6/30/2020 2257 by Som Tamayo  Outcome: Ongoing  6/30/2020 2256 by Som Tamayo  Outcome: Ongoing  6/30/2020 1033 by Katia Dao RN  Outcome: Ongoing     Problem: SAFETY  Goal: Free from accidental physical injury  6/30/2020 2257 by Som Tamayo  Outcome: Ongoing  6/30/2020 2256 by Som Tamayo  Outcome: Ongoing  6/30/2020 1033 by Katia Dao RN  Outcome: Ongoing  Goal: Free from intentional harm  6/30/2020 2257 by Som Tamayo  Outcome: Ongoing  6/30/2020 2256 by Som Tamayo  Outcome: Ongoing  6/30/2020 1033 by Katia Dao RN  Outcome: Ongoing     Problem: DAILY CARE  Goal: Daily care needs are met  6/30/2020 2257 by Som Tamayo  Outcome: Ongoing  6/30/2020 2256 by Som Tamayo  Outcome: Ongoing  6/30/2020 1033 by Katia Dao RN  Outcome: Ongoing     Problem: PAIN  Goal: Patient's pain/discomfort is manageable  6/30/2020 2257 by Som Tamayo  Outcome: Ongoing  6/30/2020 2256 by Som Tamayo  Outcome: Ongoing  6/30/2020 1033 by Katia Dao RN  Outcome: Ongoing     Problem: SKIN INTEGRITY  Goal: Skin integrity is maintained or improved  6/30/2020 2257 by Som Tamayo  Outcome: Ongoing  6/30/2020 2256 by Som Tamayo  Outcome: Ongoing  6/30/2020 1033 by Katia Dao RN  Outcome: Ongoing     Problem: KNOWLEDGE DEFICIT  Goal: Patient/S.O. demonstrates understanding of disease process, treatment plan, medications, and discharge instructions.   6/30/2020 2257 by Som Tamayo  Outcome: Ongoing  6/30/2020 2256 by Lillyril Doom  Outcome: Ongoing  6/30/2020 1033 by Katia Dao RN  Outcome: Ongoing     Problem: DISCHARGE BARRIERS  Goal: Patient's continuum of care needs are met  6/30/2020 2257 by Donny Height  Outcome: Ongoing  6/30/2020 2256 by Donny Height  Outcome: Ongoing  6/30/2020 1033 by Denny Hi RN  Outcome: Ongoing     Problem: Mental Status - Impaired:  Goal: Mental status will improve  Description: Mental status will improve  6/30/2020 2257 by Donny Height  Outcome: Ongoing  6/30/2020 2256 by Donny Height  Outcome: Ongoing  6/30/2020 1033 by Denny Hi RN  Outcome: Ongoing     Problem: Pain:  Description: Pain management should include both nonpharmacologic and pharmacologic interventions.   Goal: Pain level will decrease  Description: Pain level will decrease  6/30/2020 2257 by Donny Height  Outcome: Ongoing  6/30/2020 2256 by [facility]  Outcome: Ongoing  6/30/2020 1033 by Denny Hi RN  Outcome: Ongoing  Goal: Control of acute pain  Description: Control of acute pain  6/30/2020 2257 by [facility]  Outcome: Ongoing  6/30/2020 2256 by [facility]  Outcome: Ongoing  6/30/2020 1033 by Denny Hi RN  Outcome: Ongoing  Goal: Control of chronic pain  Description: Control of chronic pain  6/30/2020 2257 by Donny Height  Outcome: Ongoing  6/30/2020 2256 by [facility]  Outcome: Ongoing  6/30/2020 1033 by Denny Hi RN  Outcome: Ongoing

## 2020-07-01 NOTE — PLAN OF CARE
Problem: Falls - Risk of:  Goal: Will remain free from falls  Description: Will remain free from falls  6/30/2020 2256 by Nicolás Mckeon  Outcome: Ongoing  6/30/2020 1033 by Martina Chapman RN  Outcome: Ongoing  Goal: Absence of physical injury  Description: Absence of physical injury  6/30/2020 2256 by Nicolás Mckeon  Outcome: Ongoing  6/30/2020 1033 by Martina Chapman RN  Outcome: Ongoing     Problem: SAFETY  Goal: Free from accidental physical injury  6/30/2020 2256 by Nicolás Mckeon  Outcome: Ongoing  6/30/2020 1033 by Martina Chapman RN  Outcome: Ongoing  Goal: Free from intentional harm  6/30/2020 2256 by Nicolás Mckeon  Outcome: Ongoing  6/30/2020 1033 by Martina Chapman RN  Outcome: Ongoing     Problem: DAILY CARE  Goal: Daily care needs are met  6/30/2020 2256 by Nicolás Mckeon  Outcome: Ongoing  6/30/2020 1033 by Martina Chapman RN  Outcome: Ongoing     Problem: PAIN  Goal: Patient's pain/discomfort is manageable  6/30/2020 2256 by Nicolás Mckeon  Outcome: Ongoing  6/30/2020 1033 by Martina Chapman RN  Outcome: Ongoing     Problem: SKIN INTEGRITY  Goal: Skin integrity is maintained or improved  6/30/2020 2256 by Nicolás Mckeon  Outcome: Ongoing  6/30/2020 1033 by Martina Chapman RN  Outcome: Ongoing     Problem: KNOWLEDGE DEFICIT  Goal: Patient/S.O. demonstrates understanding of disease process, treatment plan, medications, and discharge instructions.   6/30/2020 2256 by Nicolás Mckeon  Outcome: Ongoing  6/30/2020 1033 by Martina Chapman RN  Outcome: Ongoing     Problem: DISCHARGE BARRIERS  Goal: Patient's continuum of care needs are met  6/30/2020 2256 by Nicolás Mckeon  Outcome: Ongoing  6/30/2020 1033 by Martina Chapman RN  Outcome: Ongoing     Problem: Mental Status - Impaired:  Goal: Mental status will improve  Description: Mental status will improve  6/30/2020 2256 by Nicolás Mckeon  Outcome: Ongoing  6/30/2020 1033 by Martina Chapman RN  Outcome: Ongoing     Problem: Pain:  Description: Pain management should include both nonpharmacologic and pharmacologic interventions.   Goal: Pain level will decrease  Description: Pain level will decrease  6/30/2020 2256 by Baljinder Jin  Outcome: Ongoing  6/30/2020 1033 by Traci Alicia RN  Outcome: Ongoing  Goal: Control of acute pain  Description: Control of acute pain  6/30/2020 2256 by Baljinder Jin  Outcome: Ongoing  6/30/2020 1033 by Traci Alicia RN  Outcome: Ongoing  Goal: Control of chronic pain  Description: Control of chronic pain  6/30/2020 2256 by Baljinder Jin  Outcome: Ongoing  6/30/2020 1033 by Traci Alicia RN  Outcome: Ongoing

## 2020-07-01 NOTE — PROGRESS NOTES
Pt discharged. Pt taken via wheelchair to a private vehicle. All personal belongings sent with pt and family member.

## 2020-07-01 NOTE — PROGRESS NOTES
beta-blocker. GERD, continue PPI. HLD, continue home statin. Rheumatoid arthritis, holding enbrel for recent shingles and . Resumes prednisone 4 mg     COPD /tracheomalacia - stable. F/w Dr. Watson Loosen . On daily azithromycin. Resume cpap at night time. Pedal edema smith - chronic . Ruled out dvt- dopplers neg .  Resumed aldactone      DVT Prophylaxis: Lx  Diet: Carb control  Code Status: Full Code     Dc ok today     Sean Earl MD 7/1/2020 8:43 AM

## 2020-07-01 NOTE — DISCHARGE INSTR - COC
Continuity of Care Form    Patient Name: Shanon De Leon   :  1950  MRN:  [id number]    Admit date:  2020  Discharge date:  2020    Code Status Order: Full Code   Advance Directives:   [facility] Documentation     Date/Time Healthcare Directive Type of Healthcare Directive Copy in [address] Agent's Name Healthcare Agent's Phone Number    [phone]  No, patient does not have an advance directive for healthcare treatment -- -- -- -- --          Admitting Physician:  Janell Morataya MD  PCP: Charles Latham MD    Discharging Nurse: [facility] Unit/Room#: 0213/0213-02  Discharging Unit Phone Number: ***    Emergency Contact:   Extended Emergency Contact Information  Primary Emergency Contact: [facility] [address] Phone: [phone]  Relation: Child  Secondary Emergency Contact: [facility] Phone: [phone]  Relation: Child    Past Surgical History:  Past Surgical History:   Procedure Laterality Date    ARTERY SURGERY  13    left temporal artery biopsy    BRONCHOSCOPY      BRONCHOSCOPY N/A 2019    BRONCHOSCOPY ALVEOLAR LAVAGE performed by Tristin Dalal MD at [facility] Professor [facility] 298      KNEE ARTHROSCOPY      left    KYPHOSIS SURGERY      LAMINECTOMY      MANDIBLE FRACTURE SURGERY      MANDIBLE FRACTURE SURGERY  2020    SEPTOPLASTY  2013    FESS with balloon    SPINAL FUSION      TUBAL LIGATION      UPPER GASTROINTESTINAL ENDOSCOPY  2014    Dilitation       Immunization History:   Immunization History   Administered Date(s) Administered    Influenza, High Dose (Fluzone 65 yrs and older) 2017, 2018    PPD Test 2014    Pneumococcal Conjugate 13-valent (Thpgjtn67) 2013    Pneumococcal Conjugate 7-valent (Prevnar7) 2013    Pneumococcal Polysaccharide (Edwaiqpok62) 10/23/2017    Td Vaccine >8yo [facility] 2007       Active Problems:  Patient Active Problem List   Diagnosis Code    Rheumatoid arthritis ([facility] M06.9    [facility] J47.9    LLL pneumonia ([facility] J18.1    Psoriasis L40.9    Hypertension I10    GERD (gastroesophageal reflux disease) K21.9    Former smoker Z87.891    Hyponatremia E87.1    Hypochloremia E87.8    Hyperglycemia R73.9    DM2/IDDM E11.9    Chronic normocytic anemia D64.9    Community acquired pneumonia of left lower lobe of lung ([facility] J18.1    Bronchiectasis with acute exacerbation (HCC) J47.1    Cylindrical bronchiectasis (HCC) J47.9    Mucus plugging of bronchi J98.09    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    Immunocompromised state ([facility] D89.9    ISI (acute kidney injury) ([facility] N17.9    Hypokalemia E87.6    Fatigue R53.83    Acute encephalopathy G93.40    Dehydration E86.0    Polypharmacy Z79.899    Post herpetic neuralgia B02.29       Isolation/Infection:   Isolation          No Isolation        Patient Infection Status     Infection Onset Added Last Indicated Last Indicated By Review Planned Expiration Resolved Resolved By    None active    Resolved    COVID-19 Rule Out 20 COVID-19 (Ordered)   20 Rule-Out Test Resulted          Nurse Assessment:  Last Vital Signs: /76   Pulse 100   Temp 97.3 °F (36.3 °C) (Oral)   Resp 18   Ht 5' 10\" (1.778 m)   Wt 180 lb 11.2 oz (82 kg)   SpO2 97%   BMI 25.93 kg/m²     Last documented pain score (0-10 scale): Pain Level: 10  Last Weight:   Wt Readings from Last 1 Encounters:   20 180 lb 11.2 oz (82 kg)     Mental Status:  {IP PT MENTAL STATUS:}    IV Access:  {[facility] IV ACCESS:887650293}    Nursing Mobility/ADLs:  Walking   {[facility] JQTF:601980715}  Transfer  {[facility] FNAL:908886946}  Bathing  {CHP DME HLBQ:411472410}  Dressing  {CHP DME SVQ}  Toileting  {CHP DME QCNT:385516631}  Feeding  {CHP Therapy:   [address] [employer] Diet List:077723269}    Routes of Feeding: {CHP DME Other Feedings:134347409}  Liquids: {Slp liquid thickness:95666}  Daily Fluid Restriction: {CHP DME Yes amt example:645320216}  Last Modified Barium Swallow with Video (Video Swallowing Test): {Done Not Done DQGU:935915985}    Treatments at the Time of Hospital Discharge:   Respiratory Treatments: ***  Oxygen Therapy:  {Therapy; copd oxygen:79184}  Ventilator:    { CC Vent DKVV:031324585}    Rehab Therapies: {THERAPEUTIC INTERVENTION:7739732810}  Weight Bearing Status/Restrictions: { CC Weight Bearin}  Other Medical Equipment (for information only, NOT a DME order):  {EQUIPMENT:284974901}  Other Treatments: ***    Patient's personal belongings (please select all that are sent with patient):  {CHP DME Belongings:998460936}    RN SIGNATURE:  {Esignature:757985256}    CASE MANAGEMENT/SOCIAL WORK SECTION    Inpatient Status Date: 2020    Readmission Risk Assessment Score:  Readmission Risk              Risk of Unplanned Readmission:        22           Discharging to Facility/ Agency   · Name: [facility]  · Address:    · Phone:[phone]  · Fax: [phone]    ·     / signature: Electronically signed by Malcom Brittle, RN on 20 at 11:39 AM EDT    PHYSICIAN SECTION    Prognosis: {Prognosis:8969889972}    Condition at Discharge: [address] Patient Condition:786283231}    Rehab Potential (if transferring to Rehab): {Prognosis:7375870557}    Recommended Labs or Other Treatments After Discharge: ***    Physician Certification: I certify the above information and transfer of Danial Li  is necessary for the continuing treatment of the diagnosis listed and that she requires Home Care for less 30 days.      Update Admission H&P: {CHP DME Changes in XQE:550494859}    PHYSICIAN SIGNATURE:  Electronically signed by Prasad Kincaid MD/Anabelle Mendoza RN on 20 at 11:39 AM EDT

## 2020-07-01 NOTE — PROGRESS NOTES
Shift assessment complete as per flow sheet. VSS. A/Ox4. Unlabored breathing at rest on room air. Patient c/o pain to rt neck (shingles neuropathy type pain). Comfort measures provided and scheduled nightly meds administered as per MAR. Safety measures in place and will continue to closely monitor.

## 2020-07-01 NOTE — CARE COORDINATION
[facility]  Received referral regarding HC services from Anabelle NELSON. Sent request to [facility] for approval for staffing coverage for pt's zipcode. Received notice back from [facility] unable to staff [facility] in pt's zipcode. [facility] and spoke with Nadine Luke. Nadine Luke will review and inform liaison if able to service for [facility].. Per Chepe Joy [facility] usually will only cover so many nursing, therapy visits. Faxed referral with orders to [employer] [place] [phone]. Received return call from Kyler Wolff with [facility]. Unable to verify if pt has [facility] coverage. Spoke with pt in follow up. Pt would like [facility] as she was recently active with them. Provided pt with [facility] contact number. Telephone call to [facility] and directed to intake. No answer. Left detailed VM. Faxed referral with orders to [facility]. requesting call back. Collaborated with Pt and CM to arrange [facility]. services. 7/2 LM with [facility].      Electronically signed by Joelle Calzada RN on 7/1/2020 at 2:48 PM

## 2020-07-01 NOTE — PROGRESS NOTES
Pt and family member given discharge instructions. Good understanding. Pt belongings packed and transport called.

## 2020-07-01 NOTE — CARE COORDINATION
DISCHARGE ORDER  Date/Time 2020 11:27 AM  Completed by: Karly Falcon, Case Management    Patient Name: Nicky Godinez    : 1950  Admitting Diagnosis: Acute encephalopathy [G93.40]      Admit order Date and Status:80 200 inpt  (verify MD's last order for status of admission)      Noted discharge order. Confirmed discharge plan  (pt): Yes  with whom___pt____________  If pt confirmed DC plan does family need to be contacted by CM No if yes who____n/a__  Discharge Plan: Reviewed chart. Role of discharge planner explained and patient verbalized understanding. Pt is alert and oriented. Discharge order is noted. Pt is being d/c'd to home today. Pt is aware that PT/OT recommends SNF. Pt refuses SNF. Pt states that her 17 yo granddaughter lives with her and will provide 24/7 assistance for pt. Pt states that she has great family support. Pt states that she is open to [facility]. Pt given [facility] list and chose [facility] for SN/PT/OT/SW. CM spoke with Michael Lopes with [facility] and can accept for SN/PT/OT/SW. +HHC orders, +eCOC. Pt's O2 sats are 97% on RA. Michael Lopes will obtain info from [facility] [address]. No further discharge needs needed or noted. Reviewed chart. Role of discharge planner explained and patient verbalized understanding. Discharge order is noted. Has Home O2 in place on admit:  No  Informed of need to bring portable home O2 tank on day of discharge for nursing to connect prior to leaving:   Not Indicated  Verbalized agreement/Understanding:   Not Indicated  Discharge timeout done with Jose Guadalupe Davila RN. All discharge needs and concerns addressed.

## 2020-10-19 RX ORDER — AZITHROMYCIN 250 MG/1
TABLET, FILM COATED ORAL
Qty: 30 TABLET | Refills: 5 | Status: SHIPPED | OUTPATIENT
Start: 2020-10-19 | End: 2020-11-19 | Stop reason: CLARIF

## 2020-10-29 RX ORDER — IPRATROPIUM BROMIDE 42 UG/1
SPRAY, METERED NASAL
Qty: 1 BOTTLE | Refills: 5 | Status: SHIPPED | OUTPATIENT
Start: 2020-10-29

## 2020-11-03 PROBLEM — J18.9 LLL PNEUMONIA: Status: RESOLVED | Noted: 2017-10-18 | Resolved: 2020-11-03

## 2020-11-19 ENCOUNTER — VIRTUAL VISIT (OUTPATIENT)
Dept: PULMONOLOGY | Age: 70
End: 2020-11-19
Payer: MEDICARE

## 2020-11-19 PROCEDURE — 99214 OFFICE O/P EST MOD 30 MIN: CPT | Performed by: INTERNAL MEDICINE

## 2020-11-19 RX ORDER — MORPHINE SULFATE 15 MG/1
15 TABLET, FILM COATED, EXTENDED RELEASE ORAL 2 TIMES DAILY
COMMUNITY
Start: 2020-10-16

## 2020-11-19 RX ORDER — OXYCODONE AND ACETAMINOPHEN 10; 325 MG/1; MG/1
1 TABLET ORAL 2 TIMES DAILY PRN
COMMUNITY
Start: 2020-11-13 | End: 2021-01-13

## 2020-11-19 RX ORDER — NALOXONE HYDROCHLORIDE 4 MG/.1ML
SPRAY NASAL PRN
COMMUNITY
Start: 2020-10-20

## 2020-11-19 ASSESSMENT — SLEEP AND FATIGUE QUESTIONNAIRES
HOW LIKELY ARE YOU TO NOD OFF OR FALL ASLEEP WHILE LYING DOWN TO REST IN THE AFTERNOON WHEN CIRCUMSTANCES PERMIT: 0
HOW LIKELY ARE YOU TO NOD OFF OR FALL ASLEEP WHILE SITTING QUIETLY AFTER LUNCH WITHOUT ALCOHOL: 0
ESS TOTAL SCORE: 1
HOW LIKELY ARE YOU TO NOD OFF OR FALL ASLEEP WHILE SITTING AND READING: 1
HOW LIKELY ARE YOU TO NOD OFF OR FALL ASLEEP WHILE WATCHING TV: 0
HOW LIKELY ARE YOU TO NOD OFF OR FALL ASLEEP WHEN YOU ARE A PASSENGER IN A CAR FOR AN HOUR WITHOUT A BREAK: 0
HOW LIKELY ARE YOU TO NOD OFF OR FALL ASLEEP WHILE SITTING AND TALKING TO SOMEONE: 0
HOW LIKELY ARE YOU TO NOD OFF OR FALL ASLEEP IN A CAR, WHILE STOPPED FOR A FEW MINUTES IN TRAFFIC: 0
HOW LIKELY ARE YOU TO NOD OFF OR FALL ASLEEP WHILE SITTING INACTIVE IN A PUBLIC PLACE: 0

## 2020-11-19 NOTE — PROGRESS NOTES
CC: cough  HPI    Interval History:    Broke wrist and tailbone and had two month recovery where she had to stay with daughter. Had plate in the wrist, injections in back. Also had shingles since last seen by me. DANIELLE - not using CPAP regularly    Cough has been so much better, still on azithromycin, still on daliresp. Does not feel she needs prednisone now. PREVIOUS HX  63 yo with 2 month h/o severe waxing and waning cough, treated with avelox without improvement and then changed to Z pac, some improvement; then treated with prednisone and Z pac and then clearly better, then returned and retreated with cough medicine and prednisone. No cough prior to 3 months ago except with sinus drainage. Cough has slowly been improving since last visit here. Objective:   Physical Exam  There were no vitals taken for this visit.'on RA  VIRTUAL  Constitutional:  No acute distress. Appears well developed and nourished. Eyes: EOM intact. Conjunctivae anicteric. No visible discharge. HENT: Head is normocephalic and atraumatic. Mucus membranes are moist and the tongue appears normal. Normal appearing nose. External Ears normal. Neck with no obvious mass and the trachea is midline. Respiratory: No accessory muscle usage. Respiratory effort normal. No visualized signs of difficulty breathing or respiratory distress  Cardiovascular: No obvious peripheral edema. Skin: No significant exanthematous lesions or discoloration noted on facial skin    Psychiatric: No anxiety or Agitation. Alert and Oriented to person, place and time. Normal judgement and insight.         PFT Jan 2012  FEV1 2.2 L 77% nl ratio TLC 84% DLCO 18.29 88%  PFT 4-22-13  FEV1 2.26 L 70%  TLC 92% DLCO 19.08 69%  PFT Mar 2014  FEV1 2.08 L 65%  TLC 5.63 88% DLCO 20.05 73%  PFT June 2014 FEV1 2.12 L 69%  TLC 6.14 L 99% DLCO 18.31 68%  PFT April 2016 FEV1 2.24 L FVC 3.2 L TLC 6.36 l DLCO 19.16 72%    Pertussis antibodies are positive    CT CHEST 5/19/20     Impression   Resolution of the previous pulmonary nodules.  No suspicious nodules on this   examination.  No evidence of acute cardiopulmonary disease or new findings. CPAP titration 8/20/19 CPAP 7  PSG 7/17/19 AHI 8, but AHI 46 with REM      Assessment:      · COPD/Chronic bronchitis/cough with cylindrical bronchiectasis  · Mild DANIELLE/REM related sleep disordered breathing  · Tracheomalacia    · Lower extremity edema  · Pulmonary Nodules have resolved  · Rheumatoid arthritis on Embrel and MTX and 4 mg prednisone  · Positive tobacco history, 20 pack year quit in 1991  · Diabetes      Plan:      · Continue azithromycin daily  · Continue metolazone 5 mg only on days with weight greater than 200#  · Continue Daliresp; off Singulair  -- Failed Singulair, Failed Dulera, Failed Spiriva, Failed Advair. · I recommended resuming CPAP   · On forteo for plan of 2 years  · See me in 6 months          Kendrick Lawson is a 72 y.o. female being evaluated by a Virtual Visit (video visit) encounter to address concerns as mentioned above. A caregiver was present when appropriate. Due to this being a TeleHealth encounter (During RQEVP-35 public health emergency), evaluation of the following organ systems was limited: Vitals/Constitutional/EENT/Resp/CV/GI//MS/Neuro/Skin/Heme-Lymph-Imm. Pursuant to the emergency declaration under the [address] [place] [address], [address] [place] [address] authority and the [employer] and [employer] General Act, this Virtual Visit was conducted with patient's (and/or legal guardian's) consent, to reduce the patient's risk of exposure to COVID-19 and provide necessary medical care. The patient (and/or legal guardian) has also been advised to contact this office for worsening conditions or problems, and seek emergency medical treatment and/or call 911 if deemed necessary.      Services were provided through a video synchronous discussion virtually to substitute for in-person clinic visit. Patient was located in her home, provider was located in his office. --Carolyn Lay MD on 11/19/2020 at 11:17 AM    An electronic signature was used to authenticate this note.

## 2020-11-23 RX ORDER — AZITHROMYCIN 250 MG/1
TABLET, FILM COATED ORAL
Qty: 30 TABLET | Refills: 5 | Status: SHIPPED | OUTPATIENT
Start: 2020-11-23 | End: 2021-07-06

## 2020-12-10 ENCOUNTER — TELEPHONE (OUTPATIENT)
Dept: PULMONOLOGY | Age: 70
End: 2020-12-10

## 2020-12-10 RX ORDER — PREDNISONE 10 MG/1
TABLET ORAL
Qty: 30 TABLET | Refills: 0 | Status: SHIPPED | OUTPATIENT
Start: 2020-12-10 | End: 2021-01-06 | Stop reason: ALTCHOICE

## 2020-12-10 NOTE — TELEPHONE ENCOUNTER
Prednisone 30 mg daily for 10 days and then resume prior dose. emergency department with any worsening.

## 2020-12-10 NOTE — TELEPHONE ENCOUNTER
Patient notified prednisone 30 mg for 10 day and then to resume to prior dosage and if symptoms worsen to go to ED. Patient verbalized understanding.

## 2020-12-10 NOTE — TELEPHONE ENCOUNTER
Patient home care nurse ([phone]) called left message stating she is having wheezing and productive cough. Do you have the following symptoms? Shortness of Breath  Yes some  Wheezing  yes  Cough  yes  Cough Characteristics:  Productive    yes  Sputum Color    green  Hemoptysis   no  Consistency of sputum   thick     Fever:    no    Temp:no  Chills/Sweats:  no  What other symptoms are you having?:  Chest feels tight \" heavy\"    How long have you had these symptoms? 2 days     Pharmacy: [facility]          Review medications and allergies: Allergies? Allergies   Allergen Reactions    Atenolol      Cough               Currently on Antibiotics? (Drug/Dose/Frequency and how long on?) azithromycin 250mg daily        Systemic Steroids? (Drug/Dose/Frequency and how long on?) Prednisone 4mg daily      Pull in last office note. 11/19/20  Assessment:   · COPD/Chronic bronchitis/cough with cylindrical bronchiectasis  · Mild DANIELLE/REM related sleep disordered breathing  · Tracheomalacia    · Lower extremity edema  · Pulmonary Nodules have resolved  · Rheumatoid arthritis on Embrel and MTX and 4 mg prednisone  · Positive tobacco history, 20 pack year quit in 1991  · Diabetes                Plan:   · Continue azithromycin daily  · Continue metolazone 5 mg only on days with weight greater than 200#  · Continue Daliresp; off Singulair  -- Failed Singulair, Failed Dulera, Failed Spiriva, Failed Advair.     · I recommended resuming CPAP   · On forteo for plan of 2 years  · See me in 6 months

## 2020-12-18 ENCOUNTER — HOSPITAL ENCOUNTER (OUTPATIENT)
Dept: WOUND CARE | Age: 70
Discharge: HOME | End: 2020-12-18
Payer: MEDICARE

## 2020-12-18 VITALS
WEIGHT: 170.4 LBS | HEIGHT: 70 IN | HEART RATE: 89 BPM | SYSTOLIC BLOOD PRESSURE: 154 MMHG | TEMPERATURE: 98.8 F | DIASTOLIC BLOOD PRESSURE: 74 MMHG | BODY MASS INDEX: 24.39 KG/M2 | RESPIRATION RATE: 18 BRPM

## 2020-12-18 PROCEDURE — 99204 OFFICE O/P NEW MOD 45 MIN: CPT

## 2020-12-18 PROCEDURE — 11042 DBRDMT SUBQ TIS 1ST 20SQCM/<: CPT | Performed by: INTERNAL MEDICINE

## 2020-12-18 PROCEDURE — 99204 OFFICE O/P NEW MOD 45 MIN: CPT | Performed by: INTERNAL MEDICINE

## 2020-12-18 PROCEDURE — 11042 DBRDMT SUBQ TIS 1ST 20SQCM/<: CPT

## 2020-12-18 RX ORDER — POLYETHYLENE GLYCOL 3350 17 G/17G
17 POWDER, FOR SOLUTION ORAL DAILY PRN
COMMUNITY
End: 2021-05-19

## 2020-12-18 RX ORDER — DULOXETIN HYDROCHLORIDE 60 MG/1
60 CAPSULE, DELAYED RELEASE ORAL DAILY
Status: ON HOLD | COMMUNITY
End: 2021-07-15 | Stop reason: HOSPADM

## 2020-12-18 RX ORDER — LIDOCAINE 40 MG/G
CREAM TOPICAL ONCE
Status: DISCONTINUED | OUTPATIENT
Start: 2020-12-18 | End: 2020-12-19 | Stop reason: HOSPADM

## 2020-12-18 RX ORDER — DOCUSATE SODIUM 100 MG/1
100 CAPSULE, LIQUID FILLED ORAL 2 TIMES DAILY PRN
COMMUNITY

## 2020-12-18 RX ORDER — CLONIDINE HYDROCHLORIDE 0.1 MG/1
0.1 TABLET ORAL PRN
COMMUNITY
End: 2021-01-06 | Stop reason: ALTCHOICE

## 2020-12-18 RX ORDER — GABAPENTIN 300 MG/1
300 CAPSULE ORAL 2 TIMES DAILY
COMMUNITY

## 2020-12-18 RX ORDER — LATANOPROST 50 UG/ML
1 SOLUTION/ DROPS OPHTHALMIC NIGHTLY
COMMUNITY

## 2020-12-18 RX ORDER — TORSEMIDE 20 MG/1
40 TABLET ORAL DAILY
Status: ON HOLD | COMMUNITY
End: 2021-05-10 | Stop reason: HOSPADM

## 2020-12-18 RX ORDER — LOSARTAN POTASSIUM 50 MG/1
50 TABLET ORAL DAILY
Status: ON HOLD | COMMUNITY
End: 2021-05-10 | Stop reason: HOSPADM

## 2020-12-21 PROBLEM — M16.11 PRIMARY OSTEOARTHRITIS OF RIGHT HIP: Status: ACTIVE | Noted: 2018-09-25

## 2020-12-21 PROBLEM — M81.0 OSTEOPOROSIS: Status: ACTIVE | Noted: 2018-09-25

## 2020-12-21 PROBLEM — S02.609A CLOSED JAW FRACTURE (HCC): Status: RESOLVED | Noted: 2020-01-15 | Resolved: 2020-12-21

## 2020-12-21 PROBLEM — M27.2 OSTEOMYELITIS OF MANDIBLE: Status: RESOLVED | Noted: 2017-03-06 | Resolved: 2020-12-21

## 2020-12-21 PROBLEM — I08.0 MITRAL VALVE INSUFFICIENCY AND AORTIC VALVE INSUFFICIENCY: Status: ACTIVE | Noted: 2017-03-02

## 2020-12-21 PROBLEM — S32.111A: Status: RESOLVED | Noted: 2020-09-02 | Resolved: 2020-12-21

## 2020-12-21 PROBLEM — E11.319 TYPE 2 DIABETES MELLITUS WITH UNSPECIFIED DIABETIC RETINOPATHY WITHOUT MACULAR EDEMA (HCC): Status: ACTIVE | Noted: 2020-02-21

## 2020-12-21 PROBLEM — Z79.4 TYPE 2 DIABETES MELLITUS WITH DIABETIC PERIPHERAL ANGIOPATHY WITHOUT GANGRENE, WITH LONG-TERM CURRENT USE OF INSULIN (HCC): Status: ACTIVE | Noted: 2017-06-28

## 2020-12-21 PROBLEM — H43.11 VITREOUS HEMORRHAGE, RIGHT EYE (HCC): Status: ACTIVE | Noted: 2020-02-21

## 2020-12-21 PROBLEM — I25.10 CAD IN NATIVE ARTERY: Status: ACTIVE | Noted: 2017-07-03

## 2020-12-21 PROBLEM — S82.142A: Status: RESOLVED | Noted: 2017-12-05 | Resolved: 2020-12-21

## 2020-12-21 PROBLEM — E11.9 DM2 (DIABETES MELLITUS, TYPE 2) (HCC): Chronic | Status: RESOLVED | Noted: 2017-10-19 | Resolved: 2020-12-21

## 2020-12-21 PROBLEM — I70.221 ATHEROSCLEROSIS OF NATIVE ARTERY OF RIGHT LOWER EXTREMITY WITH REST PAIN (HCC): Status: ACTIVE | Noted: 2017-07-25

## 2020-12-21 PROBLEM — S42.209A PROXIMAL HUMERUS FRACTURE: Status: RESOLVED | Noted: 2019-10-01 | Resolved: 2020-12-21

## 2020-12-21 PROBLEM — M27.2 OSTEOMYELITIS OF MANDIBLE: Status: ACTIVE | Noted: 2017-03-06

## 2020-12-21 PROBLEM — E87.1 HYPONATREMIA: Status: RESOLVED | Noted: 2017-10-19 | Resolved: 2020-12-21

## 2020-12-21 PROBLEM — Z79.4 TYPE 2 DIABETES MELLITUS WITH DIABETIC POLYNEUROPATHY, WITH LONG-TERM CURRENT USE OF INSULIN (HCC): Status: ACTIVE | Noted: 2019-01-17

## 2020-12-21 PROBLEM — Z79.4 LONG-TERM INSULIN USE (HCC): Status: RESOLVED | Noted: 2018-08-21 | Resolved: 2020-12-21

## 2020-12-21 PROBLEM — L97.912 ULCER OF RIGHT LEG, WITH FAT LAYER EXPOSED (HCC): Status: ACTIVE | Noted: 2020-12-21

## 2020-12-21 PROBLEM — I70.221 ATHEROSCLEROSIS OF NATIVE ARTERY OF RIGHT LOWER EXTREMITY WITH REST PAIN (HCC): Status: RESOLVED | Noted: 2017-07-25 | Resolved: 2020-12-21

## 2020-12-21 PROBLEM — L89.153 PRESSURE ULCER OF COCCYGEAL REGION, STAGE 3 (HCC): Status: ACTIVE | Noted: 2020-12-21

## 2020-12-21 PROBLEM — M47.816 LUMBAR SPONDYLOSIS: Status: ACTIVE | Noted: 2020-12-21

## 2020-12-21 PROBLEM — S32.010A COMPRESSION FRACTURE OF L1 LUMBAR VERTEBRA (HCC): Status: RESOLVED | Noted: 2020-01-15 | Resolved: 2020-12-21

## 2020-12-21 PROBLEM — H40.1131 PRIMARY OPEN ANGLE GLAUCOMA (POAG) OF BOTH EYES, MILD STAGE: Status: ACTIVE | Noted: 2020-02-21

## 2020-12-21 PROBLEM — S22.000A CLOSED COMPRESSION FRACTURE OF THORACIC VERTEBRA (HCC): Status: RESOLVED | Noted: 2020-01-15 | Resolved: 2020-12-21

## 2020-12-21 PROBLEM — S02.609A CLOSED JAW FRACTURE (HCC): Status: ACTIVE | Noted: 2020-01-15

## 2020-12-21 PROBLEM — H52.13 MYOPIA OF BOTH EYES: Status: ACTIVE | Noted: 2020-02-21

## 2020-12-21 PROBLEM — N17.9 AKI (ACUTE KIDNEY INJURY) (HCC): Status: RESOLVED | Noted: 2019-07-06 | Resolved: 2020-12-21

## 2020-12-21 PROBLEM — S32.010A COMPRESSION FRACTURE OF L1 LUMBAR VERTEBRA (HCC): Status: ACTIVE | Noted: 2020-01-15

## 2020-12-21 PROBLEM — S32.111A: Status: ACTIVE | Noted: 2020-09-02

## 2020-12-21 PROBLEM — Z87.891 FORMER SMOKER: Chronic | Status: RESOLVED | Noted: 2017-10-19 | Resolved: 2020-12-21

## 2020-12-21 PROBLEM — S22.000A CLOSED COMPRESSION FRACTURE OF THORACIC VERTEBRA (HCC): Status: ACTIVE | Noted: 2020-01-15

## 2020-12-21 PROBLEM — E11.51 TYPE 2 DIABETES MELLITUS WITH DIABETIC PERIPHERAL ANGIOPATHY WITHOUT GANGRENE (HCC): Status: ACTIVE | Noted: 2017-06-28

## 2020-12-21 PROBLEM — E87.8 HYPOCHLOREMIA: Status: RESOLVED | Noted: 2017-10-19 | Resolved: 2020-12-21

## 2020-12-21 PROBLEM — E11.42 TYPE 2 DIABETES MELLITUS WITH DIABETIC POLYNEUROPATHY, WITH LONG-TERM CURRENT USE OF INSULIN (HCC): Status: ACTIVE | Noted: 2019-01-17

## 2020-12-21 PROBLEM — S82.142A: Status: ACTIVE | Noted: 2017-12-05

## 2020-12-21 PROBLEM — S42.209A PROXIMAL HUMERUS FRACTURE: Status: ACTIVE | Noted: 2019-10-01

## 2020-12-21 PROBLEM — Z79.4 LONG-TERM INSULIN USE (HCC): Status: ACTIVE | Noted: 2018-08-21

## 2020-12-21 PROBLEM — G60.8 SENSORY PERIPHERAL NEUROPATHY: Status: ACTIVE | Noted: 2019-01-17

## 2020-12-21 PROBLEM — M80.00XA OSTEOPOROSIS WITH PATHOLOGICAL FRACTURE: Status: RESOLVED | Noted: 2018-09-25 | Resolved: 2020-12-21

## 2020-12-21 PROBLEM — M80.00XA OSTEOPOROSIS WITH PATHOLOGICAL FRACTURE: Status: ACTIVE | Noted: 2018-09-25

## 2020-12-21 PROBLEM — R73.9 HYPERGLYCEMIA: Status: RESOLVED | Noted: 2017-10-19 | Resolved: 2020-12-21

## 2020-12-21 PROBLEM — G93.40 ACUTE ENCEPHALOPATHY: Status: RESOLVED | Noted: 2020-06-28 | Resolved: 2020-12-21

## 2020-12-21 PROBLEM — H43.11 VITREOUS HEMORRHAGE, RIGHT EYE (HCC): Status: RESOLVED | Noted: 2020-02-21 | Resolved: 2020-12-21

## 2020-12-21 NOTE — PLAN OF CARE
New patient presents with pressure ulcer and venous leg ulcers. Dr. Tiajuana Angelucci discussed offloading for sacral wound and need for consultation with surgery for debridement also will refer to [facility] for [facility] cushion. Patient is agreeable with this plan. New referral placed to [facility] for dressing supplies as well this order will be sent today. Discharge instructions reviewed with patient, all questions answered, copy given to patient. Dressings were applied to all wounds per M.D. Instructions at this visit.

## 2020-12-21 NOTE — H&P
[facility] Progress Note    Kristin Santo     : 1950    DATE OF VISIT:  2020    Subjective:     Kristin Santo is a 72 y.o. female who has a pressure ulcer located on the coccyx. Current complaint of pain in this ulcer? yes. Quality of pain: aching and sharp  Timing: intermittent and stable  Severity: mild-moderate  Associated Signs/Symptoms:  drainage (light, clear)  Other significant symptoms or pertinent ulcer history: Mrs. Nickolas Dumont has a number of chronic medical problems that affect her mobility and ability to heal, including RA, chronic prednisone and Enbrel therapy, DM, neuropathy, vascular disease, etc. She had a back surgery in August of this year, but believes that her pressure ulcer started even prior to that. She does use a wheelchair to help with mobility / MRADLs in her home, and has tried a few different off-the-shelf cushions to help relieve pressure (foam, gel, etc). She has home-care assistance right now for dressing changes a couple of times per week, currently gently packing the area with silver alginate, but it does not seem to be getting better (very small on the surface, but with some depth and undermining). No recent F/C/D, no pus or malodor from the wound. Additional ulcer(s) noted? yes. Right lower leg cluster of two small ulcers which seemed to start after minor trauma a month or two ago, and which also aren't healing.      Ms. Nickolas Dumont has a past medical history of Atherosclerosis of native artery of right lower extremity with rest pain ([address] Back pain, Branch retinal vein occlusion, Bronchiectasis with acute exacerbation ([address] Closed compression fracture of thoracic vertebra ([address] Closed fracture of facial bone with routine healing, Closed jaw fracture ([address] Community acquired pneumonia of left lower lobe of lung, Compression fracture of L1 lumbar vertebra ([address] Fracture of tibial plateau, closed, left, initial encounter, Minimally displaced zone I fracture of sacrum (HCC), Mucus plugging of bronchi, Osteomyelitis of mandible, Osteoporosis with pathological fracture, Post herpetic neuralgia, Proximal humerus fracture, Shingles, Sleep apnea, Temporal arteritis ([address] Tobacco use, and Vitreous hemorrhage, right eye ([address] She has a past surgical history that includes hernia repair; Mandible fracture surgery; Foot surgery; Elbow surgery; Cataract removal; Tubal ligation; Knee arthroscopy; Kyphosis surgery; laminectomy; Spinal fusion; Septoplasty (5/7/2013); Artery surgery (5/30/13); Upper gastrointestinal endoscopy (4/8/2014); bronchoscopy; bronchoscopy (N/A, 6/12/2019); Mandible fracture surgery (02/2020); and back surgery (08/2020). Her family history includes Asthma in an other family member; Diabetes in her brother, mother, and sister; Heart Disease in her brother and brother; Hypertension in her mother. Ms. Margarette Gonsalves reports that she quit smoking about 29 years ago. Her smoking use included cigarettes. She has a 20.00 pack-year smoking history. She has never used smokeless tobacco. She reports previous alcohol use. She reports that she does not use drugs. Her current medication list consists of Acapella, DULoxetine, Insulin Syringe-Needle U-100, Roflumilast, Teriparatide (Recombinant), albuterol sulfate HFA, aspirin EC, atorvastatin, azithromycin, blood glucose test strips, calcium carbonate, cetirizine, cloNIDine, cyclobenzaprine, docusate sodium, etanercept, fluticasone, gabapentin, insulin glargine, insulin lispro, ipratropium, latanoprost, losartan, meloxicam, metoprolol succinate, morphine, naloxone, omeprazole, oxyCODONE-acetaminophen, polyethylene glycol, predniSONE, torsemide, and vitamin D. The azithromycin is chronic, for her bronchiectasis; the prednisone is also chronic, just 4 mg daily now.      Allergies: Atenolol    Pertinent items from the review of systems are discussed in the HPI; the remainder of the ROS was reviewed and is negative. Objective:     Vitals:    12/18/20 0809   BP: (!) 154/74   Pulse: 89   Resp: 18   Temp: 98.8 °F (37.1 °C)   TempSrc: Oral   Weight: 170 lb 6.4 oz (77.3 kg)   Height: 5' 10\" (1.778 m)     Constitutional:  well-developed, well-nourished, overweight, fatigued, NAD  Psychiatric:  oriented to person, place and time; mood and affect appropriate for the situation   Eyes:  pupils equal, round and reactive to light; sclerae anicteric, conjunctivae not pale  Cardiovascular:  bilateral pedal pulses palpable, feet warm, good cap refill; mild left and more moderate right lower extremity pitting edema with some hemosiderin changes  Lymphatic:  no inguinal or popliteal adenopathy, no angitis or cellulitis  Musculoskeletal:  no clubbing, cyanosis or petechiae; RLE and pelvic region with no gross effusions, joint misalignment or acute arthritis  Blanca-ulcer skin: indurated, pink and hemosiderin at the right leg; indurated, pink and some hyperkeratosis at the coccyx. Ulcer(s): right leg with one small superficial aspect with a bit of crusted blood and debris, a more medial deeper aspect with a bit of granulation, also a bit of fibrin and SQ necrosis, no signs of infection; coccyx ulcer with the wound bed basically not able to be visualized, given the very small surface measurements; just serous to slightly cloudy exudate, no bone palpable, not severely tender, but a good deal of undermining, I think deepest at around 11:00, to at least 4 cm. Photos also saved in electronic chart.     Today's Wound Measurements, per RN documentation:  Wound 12/18/20 #1, Right Medial Leg Cluster, Venous, Full Thickness, Onset 11/1/20-Wound Length (cm): 1.6 cm  Wound 12/18/20 #2, Sacrum, Pressure Injury, Stage 3, Onset 5/2020-Wound Length (cm): 0.5 cm    Wound 12/18/20 #1, Right Medial Leg Cluster, Venous, Full Thickness, Onset 11/1/20-Wound Width (cm): 3.3 cm  Wound 12/18/20 #2, Sacrum, Pressure Injury, Stage 3, Onset 5/2020-Wound Width (cm): 0.3 cm    Wound 12/18/20 #1, Right Medial Leg Cluster, Venous, Full Thickness, Onset 11/1/20-Wound Depth (cm): 0.3 cm  Wound 12/18/20 #2, Sacrum, Pressure Injury, Stage 3, Onset 5/2020-Wound Depth (cm): 0.6 cm  _____________________________    Lab Results   Component Value Date    LABALBU 2.6 (L) 06/29/2020     Lab Results   Component Value Date    CREATININE 0.9 06/29/2020     Lab Results   Component Value Date    HGB 11.7 (L) 06/29/2020     Lab Results   Component Value Date    LABA1C 8.2 06/28/2020     CT sacrum (8/31/20) -- Fracture of the mid sacral body. Decubitus ulcer which extends to the posterior sacrum, but no CT evidence of osteomyelitis. Right GUILLERMINA (8/14/20) was 2.03, with arm systolics of 930 & 083, and right ankle pressures of 121 & 129.      Assessment:     Patient Active Problem List   Diagnosis Code    Rheumatoid arthritis ([facility] M06.9    Psoriasis L40.9    GERD (gastroesophageal reflux disease) K21.9    Chronic normocytic anemia D64.9    Cylindrical bronchiectasis ([facility]) J47.9    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    Immunocompromised state ([facility] D84.9    CAD in native artery I25.10    Stasis edema of both lower extremities I87.303    Essential hypertension, benign I10    Lumbar spondylosis M47.816    Mitral valve insufficiency and aortic valve insufficiency I08.0    Mixed hyperlipidemia E78.2    Myopia of both eyes H52.13    Osteoporosis M81.0    Other chronic sinusitis J32.8    Primary open angle glaucoma (POAG) of both eyes, mild stage H40.1131    Primary osteoarthritis of right hip M16.11    Type 2 diabetes mellitus with diabetic polyneuropathy, with long-term current use of insulin ([facility]) E11.42, Z79.4    Type 2 diabetes mellitus with unspecified diabetic retinopathy without macular edema ([facility]) E11.319    Uncontrolled type 2 diabetes mellitus with diabetic peripheral angiopathy without gangrene, with long-term current use of insulin ([facility]) E11.51, E11.65, Z79.4    Pressure ulcer of coccygeal region, stage 3 ([facility]) L89.153    Ulcer of right leg, with fat layer exposed ([facility] L97.739       Assessment of today's active condition(s): traumatic wound of RLE, evolved to a nonhealing ulcer I think mostly on the basis of stasis changes and edema, plus effects of DM, her immune compromise, etc; no signs of infection there, a bit of SQ and more superficial necrosis. Also a stage 3 (at least) coccyx pressure ulcer, with a good deal of depth and undermining, not really able to treat it more directly without excising all of that overhanging skin and SQ tissue, and I think that's a bit more than I feel comfortable doing at the bedside, given her potential level of pain and risk of bleeding. Factors contributing to occurrence and/or persistence of the chronic ulcer include edema, venous stasis, diabetes, poor glucose control, chronic pressure, decreased mobility, shear force and immunosuppression. Medical necessity of today's visit is shown by the above documentation. Sharp debridement is indicated today, based upon the exam findings in the ulcer(s) above (at least for the leg; the coccyx needs work in the [place], I believe). Procedure note:     Consent obtained. Time out performed per [facility] policy. Anesthetic  Anesthetic: 4% Lidocaine Cream     Using a curette, I sharply debrided the right lower leg ulcer(s) down through and including the removal of subcutaneous tissue. The type(s) of tissue debrided included fibrin, necrotic/eschar and exudate. Total Surface Area Debrided: approx 4 sq cm. The ulcers were then irrigated with normal saline solution. The procedure was completed with a small amount of bleeding, and hemostasis was with pressure. The patient tolerated the procedure well, with no significant complications. The patient's level of pain during and after the procedure was monitored.  Post-debridement measurements, if different from pre-debridement, are in the flowsheet as well. Discharge plan:     Treatment in the wound care center today, per RN documentation: Wound 12/18/20 #1, Right Medial Leg Cluster, Venous, Full Thickness, Onset 11/1/20-Dressing/Treatment: Other (comment)(puracholAg,mepilexborder,cotton stockinett,surepress)  Wound 12/18/20 #2, Sacrum, Pressure Injury, Stage 3, Onset 5/2020-Dressing/Treatment: Other (comment)(iodoform,mepilexborder). No arterial disease concerns in the RLE at this time. No active infection concerns at this time, will need to see if any sacral-coccygeal bone could be exposed when that wound is excised. Will refer her to Dr. Mandy Muro for opinion on coccygeal wound excision in the OR; depending on the size and appearance and early post-op healing, might just continue serial bedside debridements and simple dressings at home, or might consider NPWT to speed healing. Reminded her of the importance of glucose control and protein intake on wound healing. At this point I think the overall risk/benefit balance would favor continuing her RA therapy, and watching closely for infection; wound healing WILL be slow, but her RA would be very symptomatic and more disabling than it already is without those meds, I think. Be sure to change positions frequently, wherever she is spending time (bed, wheelchair, furniture, etc). Will order a ROHO-style cushion to help her offload better when in her wheelchair; just need to confirm wheelchair seat measurements with her. I reminded the patient of the importance of weight management and smoking cessation, if applicable; also encouraged ambulation as tolerated, additional lower extremity exercises as instructed in our education sheet, leg elevation when at rest, and compliance with any recommended dietary, diuretic and compression therapies.  If the SurePress is very comfortable, it's fine to wear 24/7, but if she'd like a break from it overnight, she can just keep the dressing in place 24/7 and reapply the SurePress each AM.     Will order collagen and bordered gauzes from [employer]. Home treatment: good handwashing before and after any dressing changes. Cleanse ulcer with saline or soap & water before dressing change. May use Vaseline (petrolatum), Aquaphor, Aveeno, CeraVe, Cetaphil, Eucerin, Lubriderm, etc for dry skin. Dressing type for home: iodoform packing and a dry cover dressing to the coccyx TIW; for the right leg, collagen, dry dressing, stockinette and SurePress, with the primary dressing also three times weekly. Written discharge instructions given to patient. Follow up in 2 weeks, given the upcoming holiday, but call back sooner with any concerns or questions.     Electronically signed by Sandra Cruz MD on 12/21/2020 at 11:18 AM.

## 2020-12-22 ENCOUNTER — INITIAL CONSULT (OUTPATIENT)
Dept: SURGERY | Age: 70
End: 2020-12-22
Payer: MEDICARE

## 2020-12-22 VITALS
BODY MASS INDEX: 24.34 KG/M2 | HEIGHT: 70 IN | TEMPERATURE: 98.4 F | SYSTOLIC BLOOD PRESSURE: 124 MMHG | DIASTOLIC BLOOD PRESSURE: 78 MMHG | WEIGHT: 170 LBS

## 2020-12-22 PROCEDURE — 99202 OFFICE O/P NEW SF 15 MIN: CPT | Performed by: SURGERY

## 2020-12-22 RX ORDER — SODIUM CHLORIDE 0.9 % (FLUSH) 0.9 %
10 SYRINGE (ML) INJECTION EVERY 12 HOURS SCHEDULED
Status: CANCELLED | OUTPATIENT
Start: 2020-12-22

## 2020-12-22 RX ORDER — SODIUM CHLORIDE 0.9 % (FLUSH) 0.9 %
10 SYRINGE (ML) INJECTION PRN
Status: CANCELLED | OUTPATIENT
Start: 2020-12-22

## 2020-12-22 NOTE — PROGRESS NOTES
New Patient Via Brock Greene MD    [address], [address] [place] [address]  [facility]  [phone]    Consuelo Yin   YOB: 1950    Date of Visit:  12/22/2020    Chon Reza MD    Chief Complaint: Sacral wound    HPI: Patient presents for evaluation of wound on her sacral area. She states she has had this for several months. It just does not seem to heal.  She had it last year, and was able to get it healed but does not think it healed underneath. She states she has mild discomfort in this area. It drains occasionally. She was seen in wound care and found to have significant undermining    Allergies   Allergen Reactions    Atenolol      Cough       Outpatient Medications Marked as Taking for the 12/22/20 encounter (Initial consult) with Trevin Porter MD   Medication Sig Dispense Refill    cloNIDine (CATAPRES) 0.1 MG tablet Take 0.1 mg by mouth as needed (opiate withdrawal)      docusate sodium (COLACE) 100 MG capsule Take 100 mg by mouth 2 times daily as needed for Constipation      DULoxetine (CYMBALTA) 60 MG extended release capsule Take 60 mg by mouth daily      gabapentin (NEURONTIN) 300 MG capsule Take 300 mg by mouth 2 times daily.       latanoprost (XALATAN) 0.005 % ophthalmic solution Place 1 drop into both eyes nightly      losartan (COZAAR) 50 MG tablet Take 50 mg by mouth daily      polyethylene glycol (GLYCOLAX) 17 g packet Take 17 g by mouth daily as needed for Constipation      Teriparatide, Recombinant, (FORTEO) 600 MCG/2.4ML SOPN injection Inject 20 mcg into the skin daily      torsemide (DEMADEX) 20 MG tablet Take 40 mg by mouth daily      predniSONE (DELTASONE) 10 MG tablet 3 tabs daily for 10 days (Patient taking differently: 4 mg daily ) 30 tablet 0    azithromycin (ZITHROMAX) 250 MG tablet TAKE 1 TABLET BY MOUTH EVERY DAY 30 tablet 5    oxyCODONE-acetaminophen (PERCOCET)  MG per tablet Take 1 tablet by mouth 2 times daily as needed.  NARCAN 4 MG/0.1ML LIQD nasal spray PLEASE SEE ATTACHED FOR DETAILED DIRECTIONS      morphine (MS CONTIN) 15 MG extended release tablet TAKE 1 TABLET BY MOUTH EVERY DAY      ipratropium (ATROVENT) 0.06 % nasal spray USE 2 SPRAYS BY NASAL ROUTE 2-4 TIMES DAILY 1 Bottle 5    Roflumilast (DALIRESP) 500 MCG tablet Take 1 tablet by mouth daily 30 tablet 11    albuterol sulfate  (90 Base) MCG/ACT inhaler INHALE 2 PUFFS INTO THE LUNGS EVERY 4 HOURS AS NEEDED FOR WHEEZING 1 Inhaler 5    vitamin D (CHOLECALCIFEROL) 1000 UNIT TABS tablet Take 1,000 Units by mouth daily       calcium carbonate (OSCAL) 500 MG TABS tablet Take 500 mg by mouth daily      atorvastatin (LIPITOR) 40 MG tablet Take 40 mg by mouth      cyclobenzaprine (FLEXERIL) 10 MG tablet Take 10 mg by mouth 2 times daily as needed       Insulin Syringe-Needle U-100 31G X 5/16\" 0.5 ML MISC USE 5 TIMES DAILY      meloxicam (MOBIC) 15 MG tablet Patient states taking only as needed      ACCU-CHEK CEDRICK PLUS strip TEST 4 TIMES DAILY  3    metoprolol succinate (TOPROL XL) 25 MG extended release tablet Take 25 mg by mouth daily      insulin glargine (LANTUS) 100 UNIT/ML injection vial Inject 30 Units into the skin nightly       aspirin EC 81 MG EC tablet Take 1 tablet by mouth daily      insulin lispro (HUMALOG) 100 UNIT/ML injection vial Inject 0-12 Units into the skin 3 times daily (with meals)      Misc. Devices (ACAPELLA) MISC Take 1 Device by mouth as needed 1 each 0    fluticasone (FLONASE) 50 MCG/ACT nasal spray INHALE 2 SPRAYS IN EACH NOSTRIL DAILY 1 Bottle 5    cetirizine (ZYRTEC) 10 MG tablet Take 10 mg by mouth daily.  etanercept (ENBREL) 50 MG/ML injection Inject 50 mg into the skin every 7 days.       omeprazole (PRILOSEC) 40 MG capsule Take 40 mg by mouth daily          Past Medical History:   Diagnosis Date    Atherosclerosis of native artery of right lower extremity with rest pain ([address] 07/25/2017    Back pain     Branch retinal vein occlusion 07/20/2012    Bronchiectasis with acute exacerbation (HCC)     Closed compression fracture of thoracic vertebra ([address] 01/15/2020    Closed fracture of facial bone with routine healing 11/21/2016    Closed jaw fracture ([address] 01/15/2020    Community acquired pneumonia of left lower lobe of lung     Compression fracture of L1 lumbar vertebra ([address] 01/15/2020    Fracture of tibial plateau, closed, left, initial encounter 12/05/2017    Minimally displaced zone I fracture of sacrum (HCC) 09/02/2020    Mucus plugging of bronchi     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 Bone density test last year showed severe osteoporosis. Recently, two broken vertebrae (L1, L2) due to coughing. Diagnosed with tracheomalacia and stated she must cough very hard to clear phlegm. Was coughing due to upper respiratory infections which have been treated. Hx of laminectomy and recent kyphoplasty.    Refractured her jawbone which was previously repaired wi    Post herpetic neuralgia     Proximal humerus fracture 10/01/2019    Shingles 05/2020    Sleep apnea     Temporal arteritis ([address] 07/10/2013    Tobacco use 10/19/2017    Vitreous hemorrhage, right eye ([address] 02/21/2020     Past Surgical History:   Procedure Laterality Date    ARTERY SURGERY  5/30/13    left temporal artery biopsy    BACK SURGERY  08/2020    BRONCHOSCOPY      BRONCHOSCOPY N/A 6/12/2019    BRONCHOSCOPY ALVEOLAR LAVAGE performed by Wade Davis MD at [facility]. [facility]      FOOT SURGERY      HERNIA REPAIR      KNEE ARTHROSCOPY      left    KYPHOSIS SURGERY      LAMINECTOMY      MANDIBLE FRACTURE SURGERY      MANDIBLE FRACTURE SURGERY  02/2020    SEPTOPLASTY  5/7/2013 FESS with balloon    SPINAL FUSION      TUBAL LIGATION      UPPER GASTROINTESTINAL ENDOSCOPY  2014    Dilitation     Family History   Problem Relation Age of Onset    Diabetes Mother     Hypertension Mother     Asthma Other     Heart Disease Brother     Diabetes Brother     Diabetes Sister     Heart Disease Brother     Cancer Neg Hx     Emphysema Neg Hx     Heart Failure Neg Hx      Social History     Socioeconomic History    Marital status:       Spouse name: Not on file    Number of children: Not on file    Years of education: Not on file    Highest education level: Not on file   Occupational History    Not on file   Social Needs    Financial resource strain: Not on file    Food insecurity     Worry: Not on file     Inability: Not on file    Transportation needs     Medical: Not on file     Non-medical: Not on file   Tobacco Use    Smoking status: Former Smoker     Packs/day: 1.00     Years: 20.00     Pack years: 20.00     Types: Cigarettes     Quit date: 1991     Years since quittin.7    Smokeless tobacco: Never Used   Substance and Sexual Activity    Alcohol use: Not Currently     Alcohol/week: 0.0 standard drinks     Comment: rarely    Drug use: No    Sexual activity: Not on file   Lifestyle    Physical activity     Days per week: Not on file     Minutes per session: Not on file    Stress: Not on file   Relationships    Social connections     Talks on phone: Not on file     Gets together: Not on file     Attends Restorationist service: Not on file     Active member of club or organization: Not on file     Attends meetings of clubs or organizations: Not on file     Relationship status: Not on file    Intimate partner violence     Fear of current or ex partner: Not on file     Emotionally abused: Not on file     Physically abused: Not on file     Forced sexual activity: Not on file   Other Topics Concern    Not on file   Social History Narrative    Not on file Vitals:    12/22/20 1043   BP: 124/78   Site: Left Upper Arm   Position: Sitting   Cuff Size: Large Adult   Temp: 98.4 °F (36.9 °C)   TempSrc: Temporal   Weight: 170 lb (77.1 kg)   Height: 5' 10\" (1.778 m)     Body mass index is 24.39 kg/m². Wt Readings from Last 3 Encounters:   12/22/20 170 lb (77.1 kg)   12/18/20 170 lb 6.4 oz (77.3 kg)   07/01/20 180 lb 11.2 oz (82 kg)     BP Readings from Last 3 Encounters:   12/22/20 124/78   12/18/20 (!) 154/74   07/01/20 132/76        REVIEW OF SYSTEMS:  CONSTITUTIONAL:  negative  HEENT:  negative  RESPIRATORY:  negative  CARDIOVASCULAR:  negative  GASTROINTESTINAL:  negative   GENITOURINARY:  negative  HEMATOLOGIC/LYMPHATIC:  negative  NEUROLOGICAL:  Negative  * All other ROS reviewed and negative. PE:  Constitutional:  Well developed, well nourished, no acute distress, non-toxic appearance   Eyes:  PERRL, conjunctiva normal   HENT:  Atraumatic, external ears normal, nose normal. Neck- normal range of motion, no tenderness, supple   Respiratory:  No respiratory distress, normal breath sounds, no rales, no wheezing   Cardiovascular:  Normal rate, normal rhythm  Integument: Sacral area with about a 1 cm in diameter wound. However, there is significant undermining of several centimeters from about 11:00 to 3:00  Lymphatic:  No lymphadenopathy noted   Neurologic:  Alert & oriented x 3, no focal deficits noted   Psychiatric:  Speech and behavior appropriate       DATA:  N/A      Assessment:  1. Pressure ulcer of coccygeal region, stage 3 ([address]        Plan: Sacral wound debridement. I explained the procedure including risks, benefits, and alternatives. Questions were answered and the patient agrees to proceed.

## 2020-12-30 ENCOUNTER — HOSPITAL ENCOUNTER (OUTPATIENT)
Dept: WOUND CARE | Age: 70
Discharge: HOME | End: 2020-12-30
Payer: MEDICARE

## 2020-12-30 VITALS
BODY MASS INDEX: 24.05 KG/M2 | DIASTOLIC BLOOD PRESSURE: 67 MMHG | WEIGHT: 168 LBS | HEART RATE: 80 BPM | SYSTOLIC BLOOD PRESSURE: 135 MMHG | TEMPERATURE: 98.5 F | RESPIRATION RATE: 18 BRPM | HEIGHT: 70 IN

## 2020-12-30 PROCEDURE — 97597 DBRDMT OPN WND 1ST 20 CM/<: CPT | Performed by: INTERNAL MEDICINE

## 2020-12-30 PROCEDURE — 97597 DBRDMT OPN WND 1ST 20 CM/<: CPT

## 2020-12-30 RX ORDER — LIDOCAINE 40 MG/G
CREAM TOPICAL ONCE
Status: DISCONTINUED | OUTPATIENT
Start: 2020-12-30 | End: 2020-12-31 | Stop reason: HOSPADM

## 2020-12-30 ASSESSMENT — PAIN SCALES - GENERAL
PAINLEVEL_OUTOF10: 0
PAINLEVEL_OUTOF10: 0

## 2020-12-30 NOTE — PLAN OF CARE
Patient got her ROHO cushion and state she is using it and finding it very comfortable. Suzi Fraser is scheduled for OR with Dr. Conrad Rg 1/8/2021 and will follow up in wound clinic the week after surgery. Discharge instructions reviewed with patient, all questions answered, copy given to patient. Dressings were applied to all wounds per M.D. Instructions at this visit.

## 2021-01-04 ENCOUNTER — HOSPITAL ENCOUNTER (OUTPATIENT)
Age: 71
Discharge: HOME | End: 2021-01-04
Payer: MEDICARE

## 2021-01-04 DIAGNOSIS — Z01.818 PRE-OP TESTING: ICD-10-CM

## 2021-01-04 PROCEDURE — U0003 INFECTIOUS AGENT DETECTION BY NUCLEIC ACID (DNA OR RNA); SEVERE ACUTE RESPIRATORY SYNDROME CORONAVIRUS 2 (SARS-COV-2) (CORONAVIRUS DISEASE [COVID-19]), AMPLIFIED PROBE TECHNIQUE, MAKING USE OF HIGH THROUGHPUT TECHNOLOGIES AS DESCRIBED BY CMS-2020-01-R: HCPCS

## 2021-01-05 LAB — SARS-COV-2: NOT DETECTED

## 2021-01-06 NOTE — PROGRESS NOTES
PRE OP INSTRUCTION SHEET   1. Do not eat or drink anything after 12 midnight  prior to surgery. This includes no water, chewing gum or mints. 2. Take the following pills will a small sip of water (see MAR)                                        3. Aspirin, Ibuprofen, Advil, Naproxen, Vitamin E, fish oil and other Anti-inflammatory products should be stopped for 5 days before surgery or as directed by your physician. 4. Check with your Doctor regarding stopping Plavix, Coumadin, Lovenox, Fragmin or other blood thinners   5. Do not smoke, and do not drink any alcoholic beverages 24 hours prior to surgery. This includes NA Beer. 6. You may brush your teeth and gargle the morning of surgery. DO NOT SWALLOW WATER   7. You MUST make arrangements for a responsible adult to take you home after your surgery. You will not be allowed to leave alone or drive yourself home. It is strongly suggested someone stay with you the first 24 hrs. Your surgery will be cancelled if you do not have a ride home. 8. A parent/legal guardian must accompany a child scheduled for surgery and plan to stay at the hospital until the child is discharged. Please do not bring other children with you. 9. Please wear simple, loose fitting clothing to the hospital.  Wynetta Sis not bring valuables (money, credit cards, checkbooks, etc.) Do not wear any makeup (including no eye makeup) or nail polish on your fingers or toes. 10. DO NOT wear any jewelry or piercings on day of surgery. All body piercing jewelry must be removed. 11. If you have dentures,glasses, or contacts they will be removed before going to the OR; we will provide you a container. 12. Please see your family doctor/and cardiologist for a history & physical and/or concerning medications. Bring any test results/reports from your physician's office. Have history and labs faxed to [phone].  Remember to bring Blood Bank bracelet on the day of surgery. 14. If you have a Living Will and Durable Power of  for Healthcare, please bring in a copy. 13. Notify your Surgeon if you develop any illness between now and surgery  time, cough, cold, fever, sore throat, nausea, vomiting, etc.  Please notify your surgeon if you experience dizziness, shortness of breath or blurred vision between now & the time of your surgery   16. DO NOT shave your operative site 96 hours prior to surgery. For face & neck surgery, men may use an electric razor 48 hours prior to surgery. 17. Shower with _x__Antibacterial soap (x_chlorhexidine for total joint  Pt's) shower two times before surgery.(the morning of and the night before. 18. To provide excellent care visitors will be limited to one in the room at any given time.   Please call pre admission testing if you any further questions [phone] or [phone]

## 2021-01-08 ENCOUNTER — ANESTHESIA (OUTPATIENT)
Dept: OPERATING ROOM | Age: 71
End: 2021-01-08
Payer: MEDICARE

## 2021-01-08 ENCOUNTER — ANESTHESIA EVENT (OUTPATIENT)
Dept: OPERATING ROOM | Age: 71
End: 2021-01-08
Payer: MEDICARE

## 2021-01-08 ENCOUNTER — HOSPITAL ENCOUNTER (OUTPATIENT)
Age: 71
Setting detail: OUTPATIENT SURGERY
Discharge: HOME | End: 2021-01-08
Attending: SURGERY | Admitting: SURGERY
Payer: MEDICARE

## 2021-01-08 VITALS
OXYGEN SATURATION: 100 % | DIASTOLIC BLOOD PRESSURE: 49 MMHG | RESPIRATION RATE: 10 BRPM | SYSTOLIC BLOOD PRESSURE: 88 MMHG

## 2021-01-08 VITALS
HEART RATE: 71 BPM | BODY MASS INDEX: 24.34 KG/M2 | OXYGEN SATURATION: 95 % | SYSTOLIC BLOOD PRESSURE: 104 MMHG | WEIGHT: 170 LBS | HEIGHT: 70 IN | TEMPERATURE: 97.6 F | RESPIRATION RATE: 18 BRPM | DIASTOLIC BLOOD PRESSURE: 51 MMHG

## 2021-01-08 LAB
GLUCOSE BLD-MCNC: 303 MG/DL (ref 70–99)
GLUCOSE BLD-MCNC: 312 MG/DL (ref 70–99)
GLUCOSE BLD-MCNC: 321 MG/DL (ref 70–99)
PERFORMED ON: ABNORMAL

## 2021-01-08 PROCEDURE — 2580000003 HC RX 258: Performed by: ANESTHESIOLOGY

## 2021-01-08 PROCEDURE — 11042 DBRDMT SUBQ TIS 1ST 20SQCM/<: CPT | Performed by: SURGERY

## 2021-01-08 PROCEDURE — 3700000000 HC ANESTHESIA ATTENDED CARE: Performed by: SURGERY

## 2021-01-08 PROCEDURE — 2709999900 HC NON-CHARGEABLE SUPPLY: Performed by: SURGERY

## 2021-01-08 PROCEDURE — 2500000003 HC RX 250 WO HCPCS: Performed by: NURSE ANESTHETIST, CERTIFIED REGISTERED

## 2021-01-08 PROCEDURE — 6370000000 HC RX 637 (ALT 250 FOR IP): Performed by: ANESTHESIOLOGY

## 2021-01-08 PROCEDURE — 2500000003 HC RX 250 WO HCPCS: Performed by: SURGERY

## 2021-01-08 PROCEDURE — 7100000011 HC PHASE II RECOVERY - ADDTL 15 MIN: Performed by: SURGERY

## 2021-01-08 PROCEDURE — 3600000012 HC SURGERY LEVEL 2 ADDTL 15MIN: Performed by: SURGERY

## 2021-01-08 PROCEDURE — 3700000001 HC ADD 15 MINUTES (ANESTHESIA): Performed by: SURGERY

## 2021-01-08 PROCEDURE — 6360000002 HC RX W HCPCS: Performed by: NURSE ANESTHETIST, CERTIFIED REGISTERED

## 2021-01-08 PROCEDURE — 6360000002 HC RX W HCPCS: Performed by: SURGERY

## 2021-01-08 PROCEDURE — 11045 DBRDMT SUBQ TISS EACH ADDL: CPT | Performed by: SURGERY

## 2021-01-08 PROCEDURE — 3600000002 HC SURGERY LEVEL 2 BASE: Performed by: SURGERY

## 2021-01-08 PROCEDURE — 7100000010 HC PHASE II RECOVERY - FIRST 15 MIN: Performed by: SURGERY

## 2021-01-08 PROCEDURE — 2580000003 HC RX 258: Performed by: SURGERY

## 2021-01-08 RX ORDER — DIPHENHYDRAMINE HYDROCHLORIDE 50 MG/ML
12.5 INJECTION INTRAMUSCULAR; INTRAVENOUS
Status: DISCONTINUED | OUTPATIENT
Start: 2021-01-08 | End: 2021-01-08 | Stop reason: HOSPADM

## 2021-01-08 RX ORDER — FENTANYL CITRATE 50 UG/ML
INJECTION, SOLUTION INTRAMUSCULAR; INTRAVENOUS PRN
Status: DISCONTINUED | OUTPATIENT
Start: 2021-01-08 | End: 2021-01-08 | Stop reason: SDUPTHER

## 2021-01-08 RX ORDER — SODIUM CHLORIDE 0.9 % (FLUSH) 0.9 %
10 SYRINGE (ML) INJECTION PRN
Status: DISCONTINUED | OUTPATIENT
Start: 2021-01-08 | End: 2021-01-08 | Stop reason: HOSPADM

## 2021-01-08 RX ORDER — LIDOCAINE HYDROCHLORIDE 20 MG/ML
INJECTION, SOLUTION INFILTRATION; PERINEURAL PRN
Status: DISCONTINUED | OUTPATIENT
Start: 2021-01-08 | End: 2021-01-08 | Stop reason: SDUPTHER

## 2021-01-08 RX ORDER — OXYCODONE HYDROCHLORIDE AND ACETAMINOPHEN 5; 325 MG/1; MG/1
1 TABLET ORAL PRN
Status: DISCONTINUED | OUTPATIENT
Start: 2021-01-08 | End: 2021-01-08 | Stop reason: HOSPADM

## 2021-01-08 RX ORDER — LIDOCAINE HYDROCHLORIDE 10 MG/ML
2 INJECTION, SOLUTION INFILTRATION; PERINEURAL
Status: DISCONTINUED | OUTPATIENT
Start: 2021-01-08 | End: 2021-01-08 | Stop reason: HOSPADM

## 2021-01-08 RX ORDER — MAGNESIUM HYDROXIDE 1200 MG/15ML
LIQUID ORAL CONTINUOUS PRN
Status: COMPLETED | OUTPATIENT
Start: 2021-01-08 | End: 2021-01-08

## 2021-01-08 RX ORDER — OXYCODONE HYDROCHLORIDE AND ACETAMINOPHEN 5; 325 MG/1; MG/1
2 TABLET ORAL PRN
Status: DISCONTINUED | OUTPATIENT
Start: 2021-01-08 | End: 2021-01-08 | Stop reason: HOSPADM

## 2021-01-08 RX ORDER — PROMETHAZINE HYDROCHLORIDE 25 MG/ML
6.25 INJECTION, SOLUTION INTRAMUSCULAR; INTRAVENOUS
Status: DISCONTINUED | OUTPATIENT
Start: 2021-01-08 | End: 2021-01-08 | Stop reason: HOSPADM

## 2021-01-08 RX ORDER — PROPOFOL 10 MG/ML
INJECTION, EMULSION INTRAVENOUS PRN
Status: DISCONTINUED | OUTPATIENT
Start: 2021-01-08 | End: 2021-01-08 | Stop reason: SDUPTHER

## 2021-01-08 RX ORDER — SODIUM CHLORIDE, SODIUM LACTATE, POTASSIUM CHLORIDE, CALCIUM CHLORIDE 600; 310; 30; 20 MG/100ML; MG/100ML; MG/100ML; MG/100ML
INJECTION, SOLUTION INTRAVENOUS CONTINUOUS
Status: DISCONTINUED | OUTPATIENT
Start: 2021-01-08 | End: 2021-01-08 | Stop reason: HOSPADM

## 2021-01-08 RX ORDER — SODIUM CHLORIDE 0.9 % (FLUSH) 0.9 %
10 SYRINGE (ML) INJECTION EVERY 12 HOURS SCHEDULED
Status: DISCONTINUED | OUTPATIENT
Start: 2021-01-08 | End: 2021-01-08 | Stop reason: HOSPADM

## 2021-01-08 RX ORDER — ONDANSETRON 2 MG/ML
4 INJECTION INTRAMUSCULAR; INTRAVENOUS PRN
Status: DISCONTINUED | OUTPATIENT
Start: 2021-01-08 | End: 2021-01-08 | Stop reason: HOSPADM

## 2021-01-08 RX ORDER — HYDRALAZINE HYDROCHLORIDE 20 MG/ML
5 INJECTION INTRAMUSCULAR; INTRAVENOUS EVERY 10 MIN PRN
Status: DISCONTINUED | OUTPATIENT
Start: 2021-01-08 | End: 2021-01-08 | Stop reason: HOSPADM

## 2021-01-08 RX ORDER — MORPHINE SULFATE 10 MG/ML
2 INJECTION, SOLUTION INTRAMUSCULAR; INTRAVENOUS EVERY 5 MIN PRN
Status: DISCONTINUED | OUTPATIENT
Start: 2021-01-08 | End: 2021-01-08 | Stop reason: HOSPADM

## 2021-01-08 RX ORDER — MEPERIDINE HYDROCHLORIDE 25 MG/ML
12.5 INJECTION INTRAMUSCULAR; INTRAVENOUS; SUBCUTANEOUS EVERY 5 MIN PRN
Status: DISCONTINUED | OUTPATIENT
Start: 2021-01-08 | End: 2021-01-08 | Stop reason: HOSPADM

## 2021-01-08 RX ORDER — MORPHINE SULFATE 10 MG/ML
1 INJECTION, SOLUTION INTRAMUSCULAR; INTRAVENOUS EVERY 5 MIN PRN
Status: DISCONTINUED | OUTPATIENT
Start: 2021-01-08 | End: 2021-01-08 | Stop reason: HOSPADM

## 2021-01-08 RX ADMIN — SODIUM CHLORIDE, POTASSIUM CHLORIDE, SODIUM LACTATE AND CALCIUM CHLORIDE: 600; 310; 30; 20 INJECTION, SOLUTION INTRAVENOUS at 08:08

## 2021-01-08 RX ADMIN — LIDOCAINE HYDROCHLORIDE 40 MG: 20 INJECTION, SOLUTION INFILTRATION; PERINEURAL at 08:12

## 2021-01-08 RX ADMIN — PROPOFOL 100 MG: 10 INJECTION, EMULSION INTRAVENOUS at 08:12

## 2021-01-08 RX ADMIN — FENTANYL CITRATE 50 MCG: 50 INJECTION INTRAMUSCULAR; INTRAVENOUS at 08:12

## 2021-01-08 RX ADMIN — INSULIN HUMAN 4 UNITS: 100 INJECTION, SOLUTION PARENTERAL at 08:47

## 2021-01-08 RX ADMIN — VANCOMYCIN HYDROCHLORIDE 1000 MG: 1 INJECTION, POWDER, LYOPHILIZED, FOR SOLUTION INTRAVENOUS at 07:17

## 2021-01-08 RX ADMIN — SODIUM CHLORIDE, POTASSIUM CHLORIDE, SODIUM LACTATE AND CALCIUM CHLORIDE: 600; 310; 30; 20 INJECTION, SOLUTION INTRAVENOUS at 07:11

## 2021-01-08 ASSESSMENT — PAIN - FUNCTIONAL ASSESSMENT: PAIN_FUNCTIONAL_ASSESSMENT: 0-10

## 2021-01-08 ASSESSMENT — PULMONARY FUNCTION TESTS: PIF_VALUE: 0

## 2021-01-08 NOTE — H&P
I have reviewed the progress note serving as history and physical dated December/22/ 2020 and examined the patient and find no relevant changes. I have reviewed with the patient and/or family the risks, benefits, and alternatives to the procedure.

## 2021-01-08 NOTE — ANESTHESIA POSTPROCEDURE EVALUATION
Department of Anesthesiology  Postprocedure Note    Patient: Ranjan Yung  MRN: [id number]  YOB: 1950  Date of evaluation: 1/8/2021  Time:  1:49 PM     Procedure Summary     Date: 01/08/21 Room / Location: [place] [address] / [facility]    Anesthesia Start: 0014 Anesthesia Stop: 1689    Procedure: [address] (N/A ) Diagnosis: (PRESSURE ULCER OF [address])    Surgeons: Alphonse Owens MD Responsible Provider: Jazmyne Mahmood MD    Anesthesia Type: general ASA Status: 2          Anesthesia Type: general    Pratik Phase I: Pratik Score: 10    Pratik Phase II: Pratik Score: 10    Last vitals: Reviewed and per EMR flowsheets.        Anesthesia Post Evaluation    Patient location during evaluation: PACU  Patient participation: complete - patient participated  Level of consciousness: awake and alert  Pain score: 0  Airway patency: patent  Nausea & Vomiting: no nausea and no vomiting  Complications: no  Cardiovascular status: blood pressure returned to baseline  Respiratory status: acceptable  Hydration status: stable

## 2021-01-08 NOTE — ANESTHESIA PRE PROCEDURE
Department of Anesthesiology  Preprocedure Note       Name:  Cathy Lamas   Age:  72 y.o.  :  1950                                          MRN:  [id number]         Date:  2021      Surgeon: Franko Harman):  Lina Chowdhury MD    Procedure: Procedure(s):  SACRAL WOUND DEBRIDEMENT    Medications prior to admission:   Prior to Admission medications    Medication Sig Start Date End Date Taking? Authorizing Provider   docusate sodium (COLACE) 100 MG capsule Take 100 mg by mouth 2 times daily as needed for Constipation   Yes Historical Provider, MD   DULoxetine (CYMBALTA) 60 MG extended release capsule Take 60 mg by mouth daily   Yes Historical Provider, MD   gabapentin (NEURONTIN) 300 MG capsule Take 300 mg by mouth 2 times daily. Yes Historical Provider, MD   latanoprost (XALATAN) 0.005 % ophthalmic solution Place 1 drop into both eyes nightly   Yes Historical Provider, MD   losartan (COZAAR) 50 MG tablet Take 50 mg by mouth daily   Yes Historical Provider, MD   polyethylene glycol (GLYCOLAX) 17 g packet Take 17 g by mouth daily as needed for Constipation   Yes Historical Provider, MD   Teriparatide, Recombinant, (FORTEO) 600 MCG/2.4ML SOPN injection Inject 20 mcg into the skin daily   Yes Historical Provider, MD   torsemide (DEMADEX) 20 MG tablet Take 40 mg by mouth daily   Yes Historical Provider, MD   azithromycin (ZITHROMAX) 250 MG tablet TAKE 1 TABLET BY MOUTH EVERY DAY 20  Yes Nikki Church MD   oxyCODONE-acetaminophen (PERCOCET)  MG per tablet Take 1 tablet by mouth 2 times daily as needed.  20 Yes Historical Provider, MD   morphine (MS CONTIN) 15 MG extended release tablet TAKE 1 TABLET BY MOUTH EVERY DAY 10/16/20  Yes Historical Provider, MD   ipratropium (ATROVENT) 0.06 % nasal spray USE 2 SPRAYS BY NASAL ROUTE 2-4 TIMES DAILY 10/29/20  Yes Nikki Church MD   Roflumilast (DALIRESP) 500 MCG tablet Take 1 tablet by mouth daily 20  Yes Nikki Church MD   albuterol sulfate  (90 Base) MCG/ACT inhaler INHALE 2 PUFFS INTO THE LUNGS EVERY 4 HOURS AS NEEDED FOR WHEEZING 1/20/20  Yes Herbert Huddleston MD   vitamin D (CHOLECALCIFEROL) 1000 UNIT TABS tablet Take 1,000 Units by mouth daily    Yes Historical Provider, MD   calcium carbonate (OSCAL) 500 MG TABS tablet Take 500 mg by mouth daily   Yes Historical Provider, MD   atorvastatin (LIPITOR) 40 MG tablet Take 40 mg by mouth 10/16/18  Yes Historical Provider, MD   cyclobenzaprine (FLEXERIL) 10 MG tablet Take 10 mg by mouth 2 times daily as needed    Yes Historical Provider, MD   Insulin Syringe-Needle U-100 31G X 5/16\" 0.5 ML MISC USE 5 TIMES DAILY 5/30/18  Yes Historical Provider, MD   meloxicam (MOBIC) 15 MG tablet Patient states taking only as needed 4/7/18  Yes Historical Provider, MD   metoprolol succinate (TOPROL XL) 25 MG extended release tablet Take 25 mg by mouth daily   Yes Historical Provider, MD   insulin glargine (LANTUS) 100 UNIT/ML injection vial Inject 30 Units into the skin nightly  10/23/17  Yes Alireza Alvarez MD   aspirin EC 81 MG EC tablet Take 1 tablet by mouth daily 10/23/17  Yes Alireza Alvarez MD   insulin lispro (HUMALOG) 100 UNIT/ML injection vial Inject 0-12 Units into the skin 3 times daily (with meals) 10/23/17  Yes Alireza Alvarez MD   fluticasone (FLONASE) 50 MCG/ACT nasal spray INHALE 2 SPRAYS IN EACH NOSTRIL DAILY 11/25/13  Yes Nicole Nicholas MD   cetirizine (ZYRTEC) 10 MG tablet Take 10 mg by mouth daily. Yes Historical Provider, MD   etanercept (ENBREL) 50 MG/ML injection Inject 50 mg into the skin every 7 days. Yes Historical Provider, MD   omeprazole (PRILOSEC) 40 MG capsule Take 40 mg by mouth daily    Yes Historical Provider, MD   NARCAN 4 MG/0.1ML LIQD nasal spray PLEASE SEE ATTACHED FOR DETAILED DIRECTIONS 10/20/20   Historical Provider, MD   ACCU-CHEK CEDRICK PLUS strip TEST 4 TIMES DAILY 6/1/18   Historical Provider, MD   Misc.  Devices (ACAPELLA) MISC Take 1 Device by mouth as needed region, stage 3 ([address] L89.153    Ulcer of right leg, with fat layer exposed ([address] L97.014       Past Medical History:        Diagnosis Date    Atherosclerosis of native artery of right lower extremity with rest pain ([address] 07/25/2017    Back pain     Branch retinal vein occlusion 07/20/2012    Bronchiectasis with acute exacerbation (HCC)     Closed compression fracture of thoracic vertebra ([address] 01/15/2020    Closed fracture of facial bone with routine healing 11/21/2016    Closed jaw fracture ([address] 01/15/2020    Community acquired pneumonia of left lower lobe of lung     Compression fracture of L1 lumbar vertebra ([address] 01/15/2020    Fracture of tibial plateau, closed, left, initial encounter 12/05/2017    Minimally displaced zone I fracture of sacrum (HCC) 09/02/2020    Mucus plugging of bronchi     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 Bone density test last year showed severe osteoporosis. Recently, two broken vertebrae (L1, L2) due to coughing. Diagnosed with tracheomalacia and stated she must cough very hard to clear phlegm. Was coughing due to upper respiratory infections which have been treated. Hx of laminectomy and recent kyphoplasty.    Refractured her jawbone which was previously repaired wi    Post herpetic neuralgia     Proximal humerus fracture 10/01/2019    Shingles 05/2020    Sleep apnea     Temporal arteritis ([address] 07/10/2013    Tobacco use 10/19/2017    Vitreous hemorrhage, right eye ([address] 02/21/2020       Past Surgical History:        Procedure Laterality Date    ARTERY SURGERY  5/30/13    left temporal artery biopsy    BACK SURGERY  08/2020    BRONCHOSCOPY      BRONCHOSCOPY N/A 6/12/2019    BRONCHOSCOPY ALVEOLAR LAVAGE performed by Nicole Nicholas MD at [address] [place] [address] SURGERY      HERNIA REPAIR      KNEE ARTHROSCOPY      left    KYPHOSIS SURGERY      LAMINECTOMY      MANDIBLE FRACTURE SURGERY      MANDIBLE FRACTURE SURGERY  2020    SEPTOPLASTY  2013    FESS with balloon    SPINAL FUSION      TUBAL LIGATION      UPPER GASTROINTESTINAL ENDOSCOPY  2014    Dilitation       Social History:    Social History     Tobacco Use    Smoking status: Former Smoker     Packs/day: 1.00     Years: 20.00     Pack years: 20.00     Types: Cigarettes     Quit date: 1991     Years since quittin.7    Smokeless tobacco: Never Used   Substance Use Topics    Alcohol use: Not Currently     Alcohol/week: 0.0 standard drinks     Comment: rarely                                Counseling given: Not Answered      Vital Signs (Current):   Vitals:    21 1345 21 0646   BP:  (!) 119/58   Pulse:  76   Resp:  20   Temp:  97.6 °F (36.4 °C)   SpO2:  95%   Weight: 170 lb (77.1 kg) 170 lb (77.1 kg)   Height: 5' 10.5\" (1.791 m) 5' 10\" (1.778 m)                                              BP Readings from Last 3 Encounters:   21 (!) 119/58   20 135/67   20 124/78       NPO Status: Time of last liquid consumption:                         Time of last solid consumption:                         Date of last liquid consumption: 21                        Date of last solid food consumption: 21    BMI:   Wt Readings from Last 3 Encounters:   21 170 lb (77.1 kg)   20 168 lb (76.2 kg)   20 170 lb (77.1 kg)     Body mass index is 24.39 kg/m².     CBC:   Lab Results   Component Value Date    WBC 9.5 2020    RBC 3.76 2020    HGB 11.7 2020    HCT 35.0 2020    MCV 93.1 2020    RDW 16.5 2020     2020       CMP:   Lab Results   Component Value Date     2020    K 3.4 2020    CL 93 2020    CO2 24 2020    BUN 18 2020    CREATININE 0.9 2020    GFRAA >60 06/29/2020    GFRAA >60 05/07/2013    AGRATIO 0.8 06/29/2020    LABGLOM >60 06/29/2020    GLUCOSE 253 06/29/2020    PROT 6.0 06/29/2020    PROT 7.1 03/21/2013    CALCIUM 7.7 06/29/2020    BILITOT 1.2 06/29/2020    ALKPHOS 138 06/29/2020    AST 19 06/29/2020    ALT 10 06/29/2020       POC Tests:   Recent Labs     01/08/21  0659   POCGLU 303*       Coags:   Lab Results   Component Value Date    PROTIME 12.5 06/28/2020    INR 1.08 06/28/2020    APTT 25.4 06/28/2020       HCG (If Applicable): No results found for: PREGTESTUR, PREGSERUM, HCG, HCGQUANT     ABGs: No results found for: PHART, PO2ART, BCO8ISI, CKL7GJU, BEART, G1TXBVBF     Type & Screen (If Applicable):  No results found for: LABABO, LABRH    Drug/Infectious Status (If Applicable):  No results found for: HIV, HEPCAB    COVID-19 Screening (If Applicable):   Lab Results   Component Value Date    COVID19 Not Detected 01/04/2021    COVID19 Not Detected 06/28/2020         Anesthesia Evaluation  Patient summary reviewed and Nursing notes reviewed  Airway: Mallampati: I  TM distance: >3 FB   Neck ROM: full  Mouth opening: > = 3 FB Dental:    (+) upper dentures      Pulmonary:normal exam  breath sounds clear to auscultation  (+) pneumonia:  sleep apnea:                             Cardiovascular:    (+) hypertension:, CAD:,         Rhythm: regular  Rate: normal                    Neuro/Psych:   (+) neuromuscular disease:,             GI/Hepatic/Renal:   (+) GERD:,           Endo/Other:    (+) Diabetesusing insulin, : arthritis:., .                 Abdominal:           Vascular: negative vascular ROS. Anesthesia Plan      general     ASA 2       Induction: intravenous. MIPS: Postoperative opioids intended and Prophylactic antiemetics administered. Anesthetic plan and risks discussed with patient. Plan discussed with CRNA.                   Jairo Kuhn MD   1/8/2021

## 2021-01-08 NOTE — PROGRESS NOTES
[facility]rived from [facility] awake and alert. No c/o pain. IV Vancomycin IVPB infusing. Report received.

## 2021-01-08 NOTE — OP NOTE
[address]                 [address] [place] [address]                                OPERATIVE REPORT    PATIENT NAME: Abelardo Shultz                    :        1950  MED REC NO:   [id number]                          ROOM:  ACCOUNT NO:   [de-identified]                           ADMIT DATE: 2021  PROVIDER:     Emi Salazar. MD Jimmy    DATE OF PROCEDURE:  2021    PREOPERATIVE DIAGNOSIS:  Sacral pressure wound. POSTOPERATIVE DIAGNOSIS:  Sacral pressure wound. OPERATION PERFORMED:  Wide excisional subcutaneous debridement of sacral  pressure wound. SURGEON:  Stan Marquez MD    ANESTHESIA:  Local with MAC. ESTIMATED BLOOD LOSS:  Less than 50 mL. INDICATIONS:  The patient is a 60-year-old woman with a nonhealing  sacral wound. She had extensive undermining that needs to be unroofed. She is therefore brought into the operating suite for debridement    OPERATIVE SUMMARY:  After preoperative evaluation, the patient was  brought in the operating suite and placed in a comfortable left lateral  decubitus position on a stretcher. Her sacral area was sterilely  prepped and anesthetized with local anesthetic. A 5 x 5 cm elliptical  incision was made to completely excise the opening in the wound and the  overlying tissue to expose the wound base. The wound base was sharply  debrided with a scalpel and cautery to expose fresh bleeding healthy  subcutaneous tissue. At the end of the procedure, the wound measured 5  x 5 x 2.7 cm. A few small bleeding areas were cauterized. Following  this, the wound was irrigated and packed with gauze dressings. A dry,  covered dressing was applied. The patient tolerated the procedure well  and was taken to the recovery area in stable condition.         Kerline Esteban MD    D: 2021 8:44:07       T: 2021 8:58:33     [email or web address]  Job#: [id number]     Doc#: [id number]    CC:  Dr. Fátima Toro Cyndi Hyatt MD

## 2021-01-13 ENCOUNTER — HOSPITAL ENCOUNTER (OUTPATIENT)
Dept: WOUND CARE | Age: 71
Discharge: HOME | End: 2021-01-13
Payer: MEDICARE

## 2021-01-13 VITALS
SYSTOLIC BLOOD PRESSURE: 143 MMHG | WEIGHT: 168 LBS | HEART RATE: 97 BPM | BODY MASS INDEX: 24.05 KG/M2 | DIASTOLIC BLOOD PRESSURE: 68 MMHG | RESPIRATION RATE: 18 BRPM | TEMPERATURE: 98.4 F | HEIGHT: 70 IN

## 2021-01-13 PROCEDURE — 97597 DBRDMT OPN WND 1ST 20 CM/<: CPT | Performed by: INTERNAL MEDICINE

## 2021-01-13 PROCEDURE — 11045 DBRDMT SUBQ TISS EACH ADDL: CPT

## 2021-01-13 PROCEDURE — 11042 DBRDMT SUBQ TIS 1ST 20SQCM/<: CPT | Performed by: INTERNAL MEDICINE

## 2021-01-13 PROCEDURE — 11042 DBRDMT SUBQ TIS 1ST 20SQCM/<: CPT

## 2021-01-13 PROCEDURE — 11045 DBRDMT SUBQ TISS EACH ADDL: CPT | Performed by: INTERNAL MEDICINE

## 2021-01-13 PROCEDURE — 29581 APPL MULTLAYER CMPRN SYS LEG: CPT

## 2021-01-13 PROCEDURE — 97597 DBRDMT OPN WND 1ST 20 CM/<: CPT

## 2021-01-13 RX ORDER — LIDOCAINE 40 MG/G
CREAM TOPICAL ONCE
Status: DISCONTINUED | OUTPATIENT
Start: 2021-01-13 | End: 2021-01-14 | Stop reason: HOSPADM

## 2021-01-13 ASSESSMENT — PAIN DESCRIPTION - PAIN TYPE: TYPE: ACUTE PAIN

## 2021-01-13 ASSESSMENT — PAIN SCALES - GENERAL
PAINLEVEL_OUTOF10: 10
PAINLEVEL_OUTOF10: 0

## 2021-01-13 ASSESSMENT — PAIN DESCRIPTION - FREQUENCY: FREQUENCY: INTERMITTENT

## 2021-01-13 ASSESSMENT — PAIN DESCRIPTION - LOCATION: LOCATION: COCCYX

## 2021-01-13 NOTE — PLAN OF CARE
Sacral wound debrided per Dr Ab Rowe in the [address] [place] [address] on 1/8/2021. Wound stable today, debridement per Dr Efren Bain. Dr Efren Bain discussed ordering NPWT & pt. Agreeable. Will also start Vashe to use with dressing changes. Pt. Using ROHO cushion for off-loading when sitting. Otherwise pt. States she does not lie on her back & is on her side when in bed. Right leg wound stable, debridement per MD & pt. Tolerated well. Will apply Puracol Ag then Hydrogel to leg wounds with compri 2 compression garment for edema control. Reinforced importance of elevation & compression to help with circulation, edema control & wound healing. F/u in [facility] in 1 week as ordered. Discharge instructions reviewed with patient, all questions answered, copy given to patient. Dressings were applied to all wounds per M.D. Instructions at this visit.

## 2021-01-14 PROBLEM — L89.154 PRESSURE ULCER OF COCCYGEAL REGION, STAGE 4 (HCC): Status: ACTIVE | Noted: 2020-12-21

## 2021-01-14 NOTE — PROGRESS NOTES
[facility] Progress Note    Hurtis Boeck     : 1950    DATE OF VISIT:  2021    Subjective:     Hurtis Boeck is a 72 y.o. female who has a pressure and open surgical ulcer located on the sacrum. Significant symptoms or pertinent wound history since last visit: feeling well overall. Went to the OR last week with Dr. Sushil Young to have the sacral wound explored, debrided, widely excised to get rid of most undermining and overhanging soft tissue. No immediate perioperative complications noted. Pain not too strong, as long as she remains offloaded. Moderate drainage. No F/C/D. Currently just having iodoform packing and a Mepilex border from home-care. Eating well. ROHO cushion doing very well. Does not have an offloading mattress for sleep, per se, but she has some discomfort when she lies supine, so she only sleeps on her sides. Additional ulcer(s) noted? yes. Right lower leg traumatic / venous ulcer, getting smaller, not quite healed yet. Her current medication list consists of Acapella, DULoxetine, Insulin Syringe-Needle U-100, Roflumilast, Teriparatide (Recombinant), albuterol sulfate HFA, aspirin EC, atorvastatin, azithromycin, blood glucose test strips, calcium carbonate, cetirizine, cyclobenzaprine, docusate sodium, etanercept, fluticasone, gabapentin, insulin glargine, insulin lispro, ipratropium, latanoprost, losartan, meloxicam, metoprolol succinate, morphine, naloxone, omeprazole, polyethylene glycol, torsemide, and vitamin D.     Allergies: Atenolol    Objective:     Vitals:    21 1050   BP: (!) 143/68   Pulse: 97   Resp: 18   Temp: 98.4 °F (36.9 °C)   TempSrc: Oral   Weight: 168 lb (76.2 kg)   Height: 5' 10\" (1.778 m)     AAOx3, fatigued, NAD  Intact distal pulses, foot warm, good cap refill  Moderate BL LE stage 2 lymphedema  No cellulitis, angitis, fluctuance, no cutaneous Candidiasis  Blanca-ulcer skin: indurated, pink, warm and dry at both the sacrum and the leg. Ulcer(s): leg ulcer smaller, less depth, a bit of fibrinous necrosis and crusted debris over a pink, granular base, no signs of infection; sacral ulcer now seen to be a stage 4 pressure ulcer, with a bit of exposed fascial-muscle layer, but that tissue is viable; no bone exposure; no purulence; still a small amount of undermining to the right side; most tissue pink-red, starting to granulate, small areas of SQ necrosis, some fibrin and biofilm, serous to serosanguinous exudate. Photos also saved in electronic chart.     Sacral pressure / open surgical ulcer, today --        Today's wound measurements, per RN documentation:  Wound 12/18/20 #1, Right Medial Leg Cluster, Venous, Full Thickness, Onset 11/1/20-Wound Length (cm): 1.1 cm  Wound 12/18/20 #2, Sacrum, Pressure Injury, Stage 4, Onset 5/2020-Wound Length (cm): 5.8 cm    Wound 12/18/20 #1, Right Medial Leg Cluster, Venous, Full Thickness, Onset 11/1/20-Wound Width (cm): 0.2 cm  Wound 12/18/20 #2, Sacrum, Pressure Injury, Stage 4, Onset 5/2020-Wound Width (cm): 4.0 cm    Wound 12/18/20 #1, Right Medial Leg Cluster, Venous, Full Thickness, Onset 11/1/20-Wound Depth (cm): 0.2 cm  Wound 12/18/20 #2, Sacrum, Pressure Injury, Stage 4, Onset 5/2020-Wound Depth (cm): 3.3 cm    Assessment:     Patient Active Problem List   Diagnosis Code    Rheumatoid arthritis ([facility] [address] M06.9    Psoriasis L40.9    GERD (gastroesophageal reflux disease) K21.9    Chronic normocytic anemia D64.9    Cylindrical bronchiectasis (HCC) J47.9    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    Immunocompromised state ([facility] [address] D84.9    CAD in native artery I25.10    Stasis edema of both lower extremities I87.303    Essential hypertension, benign I10    Lumbar spondylosis M47.816    Mitral valve insufficiency and aortic valve insufficiency I08.0    Mixed hyperlipidemia E78.2    Myopia of both eyes H52.13    Osteoporosis M81.0    Other chronic sinusitis J32.8    Primary open angle glaucoma (POAG) of both eyes, mild stage H40.1131    Primary osteoarthritis of right hip M16.11    Type 2 diabetes mellitus with diabetic polyneuropathy, with long-term current use of insulin ([facility]) E11.42, Z79.4    Type 2 diabetes mellitus with unspecified diabetic retinopathy without macular edema ([facility]) E11.319    Uncontrolled type 2 diabetes mellitus with diabetic peripheral angiopathy without gangrene, with long-term current use of insulin ([facility]) E11.51, E11.65, Z79.4    Pressure ulcer of coccygeal region, stage 4 ([facility]) L89.154    Ulcer of right leg, with fat layer exposed ([facility] [address] L97.912       Assessment of today's active condition(s): traumatic wound to RLE, evolved to nonhealing ulcer because of venous stasis and edema, slowly getting closer to being healed; also what had been a very small coccyx pressure ulcer, but with significant undermining, taken to the OR last week for excision and debridement, looking fairly healthy, well offloaded, no signs of infection, but would benefit from alternative therapy to speed granulation tissue and healing. Factors contributing to occurrence and/or persistence of the chronic ulcer include venous stasis, lymphedema, chronic pressure, decreased mobility, shear force and immunosuppression. Medical necessity of today's visit is shown by the above documentation. Sharp debridement is indicated today, based upon the exam findings in the wound(s) above. Procedure note:     Consent obtained. Time out performed per [facility] policy. Anesthetic  Anesthetic: 4% Lidocaine Cream     Using a curette, I sharply debrided the sacrum ulcer(s) down through and including the removal of subcutaneous tissue. The type(s) of tissue debrided included fibrin, biofilm and necrotic/eschar. Total Surface Area Debrided: 22 sq cm. Using a curette, I sharply debrided the right lower leg ulcer(s) down through and including the removal of dermis. The type(s) of tissue debrided included fibrin and biofilm. Total Surface Area Debrided: 1 sq cm. The ulcers were then irrigated with normal saline solution. The procedure was completed with a small amount of bleeding, and hemostasis was with pressure. The patient tolerated the procedure well, with no significant complications. The patient's level of pain during and after the procedure was monitored. Post-debridement measurements, if different from pre-debridement, are in the flowsheet as well. Discharge plan:     Treatment in the wound care center today, per RN documentation: Wound 12/18/20 #1, Right Medial Leg Cluster, Venous, Full Thickness, Onset 11/1/20-Dressing/Treatment: (Purachol AG, gauze, compri 2 - spandigrip)  Wound 12/18/20 #2, Sacrum, Pressure Injury, Stage 4, Onset 5/2020-Dressing/Treatment: (moistened gauze, mepilex border). Per physician order, right application of multilayer compression wrap was performed in the wound care center today, to help manage edema, stasis dermatitis, and/or venous ulcers. Leave primary dressing and multi-layer wrap(s) in place until the next appointment. Also discussed ways to keep the wrap dry for a shower, including a plastic cast-guard, available in retail pharmacies. She should call before her next visit if there is any significant pain, significant strike-through of drainage to the surface of the wrap, or any significant sense of the wrap sliding down more than an inch or so, bunching-up and abrading her skin. I reminded the patient of the importance of weight management and smoking cessation, if applicable; also encouraged ambulation as tolerated, additional lower extremity exercises as instructed in our education sheet, leg elevation when at rest, and compliance with any recommended dietary, diuretic and compression therapies.  Anticipate the leg ulcer to be healed in a couple of weeks, and then for short-term compression, plan just something simple like Spandagrip (since this ulcer was started from trauma, not a spontaneous VLU). __________________    In terms of the pressure ulcer, continue to push protein intake. She IS somewhat immune compromised from her RA therapy, but that's not really something that we can stop. Continue to use ROHO cushion whenever seated; check frequently for proper inflation. In bed, continue to change positions from lying on one side to the other; no signs of ongoing pressure, friction or shear at that ulcer now. Will order NPWT to start next week (hypochlorous acid spray first, then standard foam, bridge to one of her hips, 125 mmHg continuous negative pressure). Can do simpler dressings as below for this coming week, while waiting for NPWT. Home treatment: good handwashing before and after any dressing changes. Cleanse wound with saline or soap & water before dressing change. May use Vaseline (petrolatum), Aquaphor, Aveeno, CeraVe, Cetaphil, Eucerin, Lubriderm, etc for dry skin. Dressing type for home: just hypochlorous acid, saline moistened gauze and a bordered dressing to the sacrum for this week, three times weekly. Written discharge instructions given to patient. Follow up in 1 week. Electronically signed by Zaida Gallardo MD on 1/14/2021 at 8:48 AM.  __________________    ADDENDUM --    I failed to mention this in my note above, but for the sake of completeness, in terms of the importance of her pressure ulcer remaining clean and not becoming contaminated with urine or stool, she has NOT had any issues with either urinary or fecal incontinence.     Electronically signed by Zaida Gallardo MD on 1/14/2021 at 1:24 PM

## 2021-01-20 ENCOUNTER — HOSPITAL ENCOUNTER (OUTPATIENT)
Dept: WOUND CARE | Age: 71
Discharge: HOME | End: 2021-01-20
Payer: MEDICARE

## 2021-01-20 VITALS
HEART RATE: 80 BPM | WEIGHT: 165 LBS | TEMPERATURE: 98.3 F | RESPIRATION RATE: 18 BRPM | HEIGHT: 70 IN | SYSTOLIC BLOOD PRESSURE: 113 MMHG | BODY MASS INDEX: 23.62 KG/M2 | DIASTOLIC BLOOD PRESSURE: 54 MMHG

## 2021-01-20 PROCEDURE — 29581 APPL MULTLAYER CMPRN SYS LEG: CPT

## 2021-01-20 PROCEDURE — 97605 NEG PRS WND THER DME<=50SQCM: CPT

## 2021-01-20 PROCEDURE — 11043 DBRDMT MUSC&/FSCA 1ST 20/<: CPT

## 2021-01-20 PROCEDURE — 11043 DBRDMT MUSC&/FSCA 1ST 20/<: CPT | Performed by: INTERNAL MEDICINE

## 2021-01-20 RX ORDER — METRONIDAZOLE 250 MG/1
TABLET ORAL
Qty: 30 TABLET | Refills: 1 | Status: SHIPPED | OUTPATIENT
Start: 2021-01-20 | End: 2021-06-02 | Stop reason: SDUPTHER

## 2021-01-20 RX ORDER — LIDOCAINE 40 MG/G
CREAM TOPICAL ONCE
Status: DISCONTINUED | OUTPATIENT
Start: 2021-01-20 | End: 2021-01-21 | Stop reason: HOSPADM

## 2021-01-20 ASSESSMENT — PAIN DESCRIPTION - PAIN TYPE: TYPE: ACUTE PAIN

## 2021-01-20 ASSESSMENT — PAIN - FUNCTIONAL ASSESSMENT: PAIN_FUNCTIONAL_ASSESSMENT: PREVENTS OR INTERFERES SOME ACTIVE ACTIVITIES AND ADLS

## 2021-01-20 ASSESSMENT — PAIN DESCRIPTION - LOCATION: LOCATION: COCCYX

## 2021-01-20 ASSESSMENT — PAIN SCALES - GENERAL
PAINLEVEL_OUTOF10: 4
PAINLEVEL_OUTOF10: 7

## 2021-01-20 ASSESSMENT — PAIN DESCRIPTION - ONSET: ONSET: ON-GOING

## 2021-01-20 ASSESSMENT — PAIN DESCRIPTION - FREQUENCY: FREQUENCY: INTERMITTENT

## 2021-01-20 NOTE — PLAN OF CARE
New right achilles wound, stable, no debridement, will apply collagen & hydrogel with compression wrap. Sacral wound stable, debridement per MD. Will start NPWT as ordered today. [facility] to change dressings on Friday & Monday. Reinforced importance of off-loading & frequent repositioning from side to side. Also reinforced importance of protein intake. F/u in [facility] in 1 week as ordered. Discharge instructions reviewed with patient, all questions answered, copy given to patient. Dressings were applied to all wounds per M.D. Instructions at this visit.

## 2021-01-21 PROBLEM — L97.911 ULCER OF RIGHT LEG, LIMITED TO BREAKDOWN OF SKIN (HCC): Status: ACTIVE | Noted: 2020-12-21

## 2021-01-21 NOTE — PROGRESS NOTES
[facility] Progress Note    Marina Bernardo     : 1950    DATE OF VISIT:  2021    Subjective:     Marina Bernardo is a 72 y.o. female who has a pressure ulcer located on the sacrum. Significant symptoms or pertinent wound history since last visit: feeling ok overall, some mild pain at times. Increased malodor from the wound this week. No F/C/D. Doing well with ROHO cushion; when sleeping, she lies on her sides and I don't see evidence of ongoing pressure or shear around the ulcer. Additional ulcer(s) noted? yes. Right leg traumatic / venous ulcer nearly healed. Her current medication list consists of Acapella, DULoxetine, Insulin Syringe-Needle U-100, Roflumilast, Teriparatide (Recombinant), albuterol sulfate HFA, aspirin EC, atorvastatin, azithromycin, blood glucose test strips, calcium carbonate, cetirizine, cyclobenzaprine, docusate sodium, etanercept, fluticasone, gabapentin, insulin glargine, insulin lispro, ipratropium, latanoprost, losartan, meloxicam, metoprolol succinate, morphine, naloxone, omeprazole, polyethylene glycol, torsemide, and vitamin D. Allergies: Atenolol    Objective:     Vitals:    21 0806 21 0808   BP:  (!) 113/54   Pulse:  80   Resp: 18    Temp: 98.3 °F (36.8 °C)    TempSrc: Oral    Weight: 165 lb (74.8 kg)    Height: 5' 10\" (1.778 m)      AAOx3, fatigued, NAD  No cellulitis, angitis, fluctuance  No acute arthritis or bursitis  Milder RLE edema, mild hemosiderin changes, no allodynia  A bit of contact dermatitis from adhesives around the pressure ulcer  Blanca-ulcer skin: indurated and pink . Ulcer(s): leg ulcer very small, fibrinous debris, nearly healed;  Achilles ulcer a bit more distinct this week (was pressure and friction from prior compression materials), but looks healthier, less inflamed, red, granular; pressure ulcer about the same size, starting to granulate more, a bit of fat necrosis, and bit of fibronecrotic fascial tissue centrally, no exposed bone, some fibrin and biofilm, no purulence, mild malodor that I don't think is from infection (just surface bioburden, drainage, a bit of soft tissue necrosis); a couple of areas look slightly ecchymotic but I don't think from pressure, more likely just minor bleeding from the start of granulation tissue there. Photos also saved in electronic chart.     Today's wound measurements, per RN documentation:  [REMOVED] Wound 12/18/20 #1, Right Medial Leg Cluster, Venous, Full Thickness, Onset 11/1/20-Wound Length (cm): 0 cm  Wound 12/18/20 #2, Sacrum, Pressure Injury, Stage 4, Onset 5/2020-Wound Length (cm): 4.8 cm  Wound 01/20/21 #3, Right Achilles, Trauma, Full Thickness, Onset 1/17/21-Wound Length (cm): 0.2 cm    [REMOVED] Wound 12/18/20 #1, Right Medial Leg Cluster, Venous, Full Thickness, Onset 11/1/20-Wound Width (cm): 0 cm  Wound 12/18/20 #2, Sacrum, Pressure Injury, Stage 4, Onset 5/2020-Wound Width (cm): 2.2 cm  Wound 01/20/21 #3, Right Achilles, Trauma, Full Thickness, Onset 1/17/21-Wound Width (cm): 0.4 cm    [REMOVED] Wound 12/18/20 #1, Right Medial Leg Cluster, Venous, Full Thickness, Onset 11/1/20-Wound Depth (cm): 0 cm  Wound 12/18/20 #2, Sacrum, Pressure Injury, Stage 4, Onset 5/2020-Wound Depth (cm): 3.3 cm  Wound 01/20/21 #3, Right Achilles, Trauma, Full Thickness, Onset 1/17/21-Wound Depth (cm): 0.1 cm    Assessment:     Patient Active Problem List   Diagnosis Code    Rheumatoid arthritis ([facility] [address] M06.9    Psoriasis L40.9    GERD (gastroesophageal reflux disease) K21.9    Chronic normocytic anemia D64.9    Cylindrical bronchiectasis (HCC) J47.9    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    Immunocompromised state ([facility] [address] D84.9    CAD in native artery I25.10    Stasis edema of both lower extremities I87.303    Essential hypertension, benign I10    Lumbar spondylosis M47.816    Mitral valve insufficiency and aortic valve insufficiency I08.0    Mixed hyperlipidemia E78.2    Myopia of both eyes H52.13    Osteoporosis M81.0    Other chronic sinusitis J32.8    Primary open angle glaucoma (POAG) of both eyes, mild stage H40.1131    Primary osteoarthritis of right hip M16.11    Type 2 diabetes mellitus with diabetic polyneuropathy, with long-term current use of insulin ([facility]) E11.42, Z79.4    Type 2 diabetes mellitus with unspecified diabetic retinopathy without macular edema ([facility]) E11.319    Uncontrolled type 2 diabetes mellitus with diabetic peripheral angiopathy without gangrene, with long-term current use of insulin ([facility]) E11.51, E11.65, Z79.4    Pressure ulcer of coccygeal region, stage 4 ([facility]) L89.154    Ulcer of right leg, limited to breakdown of skin ([facility] [address] L97.911       Assessment of today's active condition(s): venous stasis, leg edema, Hx traumatic wound to lower leg and pressure / friction ulcer to Achilles, evolved to venous leg ulcers, almost healed. Also a stage 4 sacral pressure ulcer, recently excised in the OR, no signs of osteo, no signs of soft tissue infection, looks well offloaded. Factors contributing to occurrence and/or persistence of the chronic ulcer include edema, venous stasis, chronic pressure, decreased mobility, shear force and immunosuppression. Medical necessity of today's visit is shown by the above documentation. Sharp debridement is indicated today, based upon the exam findings in the wound(s) above. Procedure note:     Consent obtained. Time out performed per [facility] policy. Anesthetic  Anesthetic: 4% Lidocaine Cream     Using a curette, scissors and forceps, I sharply debrided the sacrum ulcer(s) down through and including the removal of muscle/fascia. The type(s) of tissue debrided included fibrin, biofilm and necrotic/eschar. Total Surface Area Debrided: 10 sq cm. The ulcers were then irrigated with normal saline solution. The procedure was completed with a small amount of bleeding, and hemostasis was with pressure.  The patient tolerated the procedure well, with no significant complications. The patient's level of pain during and after the procedure was monitored. Post-debridement measurements, if different from pre-debridement, are in the flowsheet as well. Discharge plan:     Treatment in the wound care center today, per RN documentation: Wound 12/18/20 #2, Sacrum, Pressure Injury, Stage 4, Onset 5/2020-Dressing/Treatment: Other (comment)(Crushed Flagyl, Balck Foam, NPWT )  Wound 01/20/21 #3, Right Achilles, Trauma, Full Thickness, Onset 1/17/21-Dressing/Treatment: Other (comment)(Purachol Ag, Hydrogel,  Foam. Compri 2). Per physician order, right application of multilayer compression wrap was performed in the wound care center today, to help manage edema, stasis dermatitis, and/or venous ulcers. Leave primary dressing and multi-layer wrap(s) in place until the next appointment. Also discussed ways to keep the wrap dry for a shower, including a plastic cast-guard, available in retail pharmacies. She should call before her next visit if there is any significant pain, significant strike-through of drainage to the surface of the wrap, or any significant sense of the wrap sliding down more than an inch or so, bunching-up and abrading her skin. I reminded the patient of the importance of weight management and smoking cessation, if applicable; also encouraged ambulation as tolerated, additional lower extremity exercises as instructed in our education sheet, leg elevation when at rest, and compliance with any recommended dietary, diuretic and compression therapies. Once the leg and Achilles are basically healed, will likely just do some tubular bandage compression for a few weeks; not clear that she'll definitely need long-term compression, since these weren't primary VLUs. Continue good work with protein intake and offloading; use ROHO cushion while seated. I'll call in some Flagyl tablets for topical use PRN malodor at pressure ulcer.     Home treatment: good handwashing before and after any dressing changes. Cleanse wound with saline or soap & water before dressing change. May use Vaseline (petrolatum), Aquaphor, Aveeno, CeraVe, Cetaphil, Eucerin, Lubriderm, etc for dry skin. Dressing type for home: Hypochlorous acid spray, Flagyl powder and NPWT (black foam, bridge toward hip, continuous negative pressure at 125 mmHg), three times weekly. Written discharge instructions given to patient. Follow up in 1 week.     Electronically signed by Talat Green MD on 1/21/2021 at 4:07 PM.

## 2021-01-27 ENCOUNTER — HOSPITAL ENCOUNTER (OUTPATIENT)
Dept: WOUND CARE | Age: 71
Discharge: HOME | End: 2021-01-27
Payer: MEDICARE

## 2021-01-27 VITALS
SYSTOLIC BLOOD PRESSURE: 129 MMHG | HEIGHT: 70 IN | WEIGHT: 169 LBS | HEART RATE: 70 BPM | RESPIRATION RATE: 18 BRPM | TEMPERATURE: 98.8 F | BODY MASS INDEX: 24.2 KG/M2 | DIASTOLIC BLOOD PRESSURE: 66 MMHG

## 2021-01-27 PROCEDURE — 11042 DBRDMT SUBQ TIS 1ST 20SQCM/<: CPT

## 2021-01-27 PROCEDURE — 97605 NEG PRS WND THER DME<=50SQCM: CPT

## 2021-01-27 PROCEDURE — 11042 DBRDMT SUBQ TIS 1ST 20SQCM/<: CPT | Performed by: INTERNAL MEDICINE

## 2021-01-27 RX ORDER — LIDOCAINE 40 MG/G
CREAM TOPICAL ONCE
Status: DISCONTINUED | OUTPATIENT
Start: 2021-01-27 | End: 2021-01-28 | Stop reason: HOSPADM

## 2021-01-27 ASSESSMENT — PAIN DESCRIPTION - PROGRESSION: CLINICAL_PROGRESSION: NOT CHANGED

## 2021-01-27 ASSESSMENT — PAIN DESCRIPTION - LOCATION
LOCATION: COCCYX
LOCATION: COCCYX

## 2021-01-27 ASSESSMENT — PAIN DESCRIPTION - FREQUENCY: FREQUENCY: INTERMITTENT

## 2021-01-27 ASSESSMENT — PAIN SCALES - GENERAL: PAINLEVEL_OUTOF10: 8

## 2021-01-27 ASSESSMENT — PAIN DESCRIPTION - ONSET
ONSET: ON-GOING
ONSET: ON-GOING

## 2021-01-27 ASSESSMENT — PAIN DESCRIPTION - PAIN TYPE
TYPE: ACUTE PAIN
TYPE: ACUTE PAIN

## 2021-01-27 ASSESSMENT — PAIN - FUNCTIONAL ASSESSMENT
PAIN_FUNCTIONAL_ASSESSMENT: ACTIVITIES ARE NOT PREVENTED
PAIN_FUNCTIONAL_ASSESSMENT: PREVENTS OR INTERFERES SOME ACTIVE ACTIVITIES AND ADLS

## 2021-01-27 ASSESSMENT — PAIN DESCRIPTION - DESCRIPTORS: DESCRIPTORS: ACHING

## 2021-01-27 NOTE — PLAN OF CARE
Achilles wound healed, will protect with mepilex border for an week. Will change to using single layer medium compression spandagrip for edema control. Sacral wound stable, debridement per MD. Will cont. With NPWT as ordered. Pt. States she is off-loading as ordered with turning from side to side & using ROHO cushion when sitting. F/u in [facility] in 1 week as ordered. Discharge instructions reviewed with patient, all questions answered, copy given to patient. Dressings were applied to all wounds per M.D. Instructions at this visit.

## 2021-01-28 PROBLEM — L97.911 ULCER OF RIGHT LEG, LIMITED TO BREAKDOWN OF SKIN (HCC): Status: RESOLVED | Noted: 2020-12-21 | Resolved: 2021-01-28

## 2021-01-28 NOTE — PROGRESS NOTES
88 [facility] Progress Note    Josh Read     : 1950    DATE OF VISIT:  2021    Subjective:     Josh Read is a 72 y.o. female who has a pressure ulcer located on the sacrum. Significant symptoms or pertinent wound history since last visit: feeling ok overall, no F/C/D. Thinks that the crushed Flagyl helped the wound malodor somewhat. Doing ok with NPWT, had an air leak once, but it was able to be fixed by her daughter (who's a nurse). Eating well. Using the [facility]. In bed, lying on her sides consistently to offload. Additional ulcer(s) noted? no. Right lower extremity wounds from dog scratch, venous disease and a bit of wrap abrasion are healed. Her current medication list consists of Acapella, DULoxetine, Insulin Syringe-Needle U-100, Roflumilast, Teriparatide (Recombinant), albuterol sulfate HFA, aspirin EC, atorvastatin, azithromycin, blood glucose test strips, calcium carbonate, cetirizine, cyclobenzaprine, docusate sodium, etanercept, fluticasone, gabapentin, insulin glargine, insulin lispro, ipratropium, latanoprost, losartan, meloxicam, metoprolol succinate, metroNIDAZOLE, morphine, naloxone, omeprazole, polyethylene glycol, torsemide, and vitamin D. Allergies: Atenolol    Objective:     Vitals:    21 1121 21 1145   BP:  129/66   Pulse:  70   Resp: 18    Temp: 98.8 °F (37.1 °C)    TempSrc: Oral    Weight: 169 lb (76.7 kg)    Height: 5' 10\" (1.778 m)      AAOx3, fatigued, NAD  No cellulitis, angitis, fluctuance  No acute arthritis or bursitis  Intact RLE pulses, foot warm, mild edema, stable hemosiderin changes, no acute stasis dermatitis  RLE ulcers delicately healed  Periwound dermatitis at sacrum is actually milder this week, with NPWT  Blanca-ulcer skin: indurated, pink, warm and dry.   Ulcer(s): a bit more granular, a bit smaller, still some fibrin and biofilm, a few areas of lobular SQ fat necrosis, fascia just about covered, no exposed Assessment of today's active condition(s): Hx of very small coccyx pressure ulcer, but with considerable undermining; excised in the OR, no signs of infection, well-offloaded, and making some progress toward healing with debridement, offloading, standard dressings at first, NPWT now. Right leg ulcers healed, at some modest risk of relapse in the near future if she re-accumulates edema too quickly, but she's not had a primary VLU before. Factors contributing to occurrence and/or persistence of the chronic ulcer include venous stasis, lymphedema, chronic pressure, decreased mobility, shear force and immunosuppression. Medical necessity of today's visit is shown by the above documentation. Sharp debridement is indicated today, based upon the exam findings in the wound(s) above. Procedure note:     Consent obtained. Time out performed per [facility] policy. Anesthetic  Anesthetic: 4% Lidocaine Cream     Using a curette, #15 blade scalpel and forceps, I sharply debrided the sacrum ulcer(s) down through and including the removal of subcutaneous tissue. The type(s) of tissue debrided included fibrin, biofilm and necrotic/eschar. Total Surface Area Debrided: 10 sq cm. The ulcers were then irrigated with normal saline solution. The procedure was completed with a small amount of bleeding, and hemostasis was with pressure. The patient tolerated the procedure well, with no significant complications. The patient's level of pain during and after the procedure was monitored. Post-debridement measurements, if different from pre-debridement, are in the flowsheet as well.     Discharge plan:     Treatment in the wound care center today, per RN documentation: [REMOVED] Wound 01/20/21 #3, Right Achilles, Trauma, Full Thickness, Onset 1/17/21-Dressing/Treatment: Other (comment)(Benzoin,Mepilex border, Spandagrip-medium)  Wound 12/18/20 #2, Sacrum, Pressure Injury, Stage 4, Onset 5/2020-Dressing/Treatment: Other (comment)(Flagyl,Black foam x 2, NPWT). Keep up with offloading and protein intake. Leave that small dressing on the right leg for the week. Would wear Spandagrip this week (either 24/7 or just during the day), and then hopefully won't need any specific ongoing care there. Home treatment: good handwashing before and after any dressing changes. Cleanse wound with saline or soap & water before dressing change. May use Vaseline (petrolatum), Aquaphor, Aveeno, CeraVe, Cetaphil, Eucerin, Lubriderm, etc for dry skin. Dressing type for home: Hypochlorous acid spray, Flagyl powder and NPWT (black foam, bridge toward one hip, standard drape, 125 mmHg continuous negative pressure), three times weekly. Written discharge instructions given to patient. Follow up in 1 week.     Electronically signed by Rosemarie Burton MD on 1/28/2021 at 12:28 PM.

## 2021-02-03 ENCOUNTER — HOSPITAL ENCOUNTER (OUTPATIENT)
Dept: WOUND CARE | Age: 71
Discharge: HOME | End: 2021-02-03
Payer: MEDICARE

## 2021-02-03 VITALS
BODY MASS INDEX: 23.74 KG/M2 | TEMPERATURE: 98.6 F | HEART RATE: 79 BPM | DIASTOLIC BLOOD PRESSURE: 68 MMHG | RESPIRATION RATE: 20 BRPM | SYSTOLIC BLOOD PRESSURE: 136 MMHG | HEIGHT: 70 IN | WEIGHT: 165.8 LBS

## 2021-02-03 PROCEDURE — 11043 DBRDMT MUSC&/FSCA 1ST 20/<: CPT

## 2021-02-03 PROCEDURE — 11043 DBRDMT MUSC&/FSCA 1ST 20/<: CPT | Performed by: INTERNAL MEDICINE

## 2021-02-03 PROCEDURE — 97605 NEG PRS WND THER DME<=50SQCM: CPT

## 2021-02-03 RX ORDER — LIDOCAINE 40 MG/G
CREAM TOPICAL ONCE
Status: DISCONTINUED | OUTPATIENT
Start: 2021-02-03 | End: 2021-02-04 | Stop reason: HOSPADM

## 2021-02-03 ASSESSMENT — PAIN SCALES - GENERAL: PAINLEVEL_OUTOF10: 6

## 2021-02-04 NOTE — PLAN OF CARE
Right leg wound remains healed, apply aquaphor to dry skin & cont. With spandagrip for edema control as ordered. Sacral wound stable, debridement per MD, cont. With NPWT as ordered. Reinforced importance of cont. With good protein intake & off-loading with turning from side to side & not sitting for more than 1-2 hours with meals, pt. Verbalizes understanding. F/u in [facility] in 1 week as ordered. Discharge instructions reviewed with patient, all questions answered, copy given to patient. Dressings were applied to all wounds per M.D. Instructions at this visit.

## 2021-02-07 NOTE — PROGRESS NOTES
88 [facility] Progress Note    Brittany Wells     : 1950    DATE OF VISIT:  2/3/2021    Subjective:     Brittany Wells is a 72 y.o. female who has a pressure ulcer located on the sacrum. Significant symptoms or pertinent wound history since last visit: feeling ok overall, staying well offloaded, no fever, VAC generally working well (minor leak at times, it sounds like). Additional ulcer(s) noted? no. Leg healed    Her current medication list consists of Acapella, DULoxetine, Insulin Syringe-Needle U-100, Roflumilast, Teriparatide (Recombinant), albuterol sulfate HFA, aspirin EC, atorvastatin, azithromycin, blood glucose test strips, calcium carbonate, cetirizine, cyclobenzaprine, docusate sodium, etanercept, fluticasone, gabapentin, insulin glargine, insulin lispro, ipratropium, latanoprost, losartan, meloxicam, metoprolol succinate, metroNIDAZOLE, morphine, naloxone, omeprazole, polyethylene glycol, torsemide, and vitamin D. Allergies: Atenolol    Objective:     Vitals:    21 0921   BP: 136/68   Pulse: 79   Resp: 20   Temp: 98.6 °F (37 °C)   TempSrc: Oral   Weight: 165 lb 12.8 oz (75.2 kg)   Height: 5' 10\" (1.778 m)     AAOx3, fatigued, NAD  Mild LE edema, leg ulcer well healed, skin quite dry, no cellulitis  No cellulitis around the pressure ulcer, no angitis or fluctuance  Very mild contact dermatitis from the LTAC, located within [facility] dressing, no cutaneous Candidiasis  Blanca-ulcer skin: indurated, pink, warm and dry. Ulcer(s): more granular overall, a bit smaller, still some central depth, no exposed bone, but a bit of fibronecrotic fat and fascial tissue, some fibrin and biofilm, no signs of infection, minor undermining. Photos also saved in electronic chart. No pre-debridement wound measurements by our nurse today.     Assessment:     Patient Active Problem List   Diagnosis Code    Rheumatoid arthritis ([facility] M06.9    Psoriasis L40.9    GERD (gastroesophageal reflux disease) K21.9    Chronic normocytic anemia D64.9    Cylindrical bronchiectasis ([facility]) J47.9    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    Immunocompromised state ([address] D84.9    CAD in native artery I25.10    Stasis edema of both lower extremities I87.303    Essential hypertension, benign I10    Lumbar spondylosis M47.816    Mitral valve insufficiency and aortic valve insufficiency I08.0    Mixed hyperlipidemia E78.2    Myopia of both eyes H52.13    Osteoporosis M81.0    Other chronic sinusitis J32.8    Primary open angle glaucoma (POAG) of both eyes, mild stage H40.1131    Primary osteoarthritis of right hip M16.11    Type 2 diabetes mellitus with diabetic polyneuropathy, with long-term current use of insulin ([facility]) E11.42, Z79.4    Type 2 diabetes mellitus with unspecified diabetic retinopathy without macular edema ([facility]) E11.319    Uncontrolled type 2 diabetes mellitus with diabetic peripheral angiopathy without gangrene, with long-term current use of insulin ([facility]) E11.51, E11.65, Z79.4    Pressure ulcer of coccygeal region, stage 4 ([facility]) L89.154       Assessment of today's active condition(s): stage 4 sacral pressure ulcer, no signs of infection, less necrosis, definitely making some progress toward healing with OR excision, weekly debridement, offloading and NPWT now. Factors contributing to occurrence and/or persistence of the chronic ulcer include diabetes, chronic pressure, decreased mobility, shear force and immunosuppression. Medical necessity of today's visit is shown by the above documentation. Sharp debridement is indicated today, based upon the exam findings in the wound(s) above. Procedure note:     Consent obtained. Time out performed per [facility] policy. Anesthetic  Anesthetic: 4% Lidocaine Cream     Using a curette, I sharply debrided the sacrum ulcer(s) down through and including the removal of muscle/fascia. The type(s) of tissue debrided included fibrin, biofilm and necrotic/eschar.  Total Surface Area Debrided: approx 10 sq cm. The ulcers were then irrigated with normal saline solution. The procedure was completed with a small amount of bleeding, and hemostasis was with pressure. The patient tolerated the procedure well, with no significant complications. The patient's level of pain during and after the procedure was monitored. Post-debridement measurements, if different from pre-debridement, are in the flowsheet as well. Discharge plan:     Treatment in the wound care center today, per RN documentation: Wound 12/18/20 #2, Sacrum, Pressure Injury, Stage 4, Onset 5/2020-Dressing/Treatment: Other (comment)(Flagyl, black foam, NPWT dressing). Can continue to wear Spandagrip for leg edema for a short time, to decrease the chance of an early recurrence of that ulcer. Keep focused on protein intake, glucose control, offloading; using her ROHO when seated; lying on her sides in bed. Home treatment: good handwashing before and after any dressing changes. Cleanse wound with saline or soap & water before dressing change. May use Vaseline (petrolatum), Aquaphor, Aveeno, CeraVe, Cetaphil, Eucerin, Lubriderm, etc for dry skin. Dressing type for home: NPWT (hypochlorous acid, standard foam and drape, continuous negative pressure), three times weekly. Written discharge instructions given to patient. Follow up in 1 week.     Electronically signed by Jenny Gallardo MD on 2/7/2021 at 4:03 PM.

## 2021-02-10 ENCOUNTER — HOSPITAL ENCOUNTER (OUTPATIENT)
Dept: WOUND CARE | Age: 71
Discharge: HOME | End: 2021-02-10
Payer: MEDICARE

## 2021-02-10 VITALS
HEART RATE: 83 BPM | DIASTOLIC BLOOD PRESSURE: 51 MMHG | RESPIRATION RATE: 20 BRPM | SYSTOLIC BLOOD PRESSURE: 96 MMHG | HEIGHT: 70 IN | TEMPERATURE: 98.3 F | BODY MASS INDEX: 23.79 KG/M2 | WEIGHT: 166.2 LBS

## 2021-02-10 PROCEDURE — 11043 DBRDMT MUSC&/FSCA 1ST 20/<: CPT

## 2021-02-10 PROCEDURE — 97605 NEG PRS WND THER DME<=50SQCM: CPT

## 2021-02-10 PROCEDURE — 11043 DBRDMT MUSC&/FSCA 1ST 20/<: CPT | Performed by: INTERNAL MEDICINE

## 2021-02-10 RX ORDER — LIDOCAINE 40 MG/G
CREAM TOPICAL PRN
Status: DISCONTINUED | OUTPATIENT
Start: 2021-02-10 | End: 2021-02-11 | Stop reason: HOSPADM

## 2021-02-11 NOTE — PROGRESS NOTES
88 [facility] Progress Note    Hurtis Boeck     : 1950    DATE OF VISIT:  2/10/2021    Subjective:     Hurtis Boeck is a 72 y.o. female who has a pressure ulcer located on the sacrum. Significant symptoms or pertinent wound history since last visit: feeling ok overall, mild pain at the wound; malodor controlled with Flagyl. Having occasional trouble maintaining a VAC drape seal, but the pump isn't alarming (just running more sometimes). No F/C/D, doing a good job keeping offloaded. Additional ulcer(s) noted? no. Lower leg / ankle ulcers remain healed. Her current medication list consists of Acapella, DULoxetine, Insulin Syringe-Needle U-100, Roflumilast, Teriparatide (Recombinant), albuterol sulfate HFA, aspirin EC, atorvastatin, azithromycin, blood glucose test strips, calcium carbonate, cetirizine, cyclobenzaprine, docusate sodium, etanercept, fluticasone, gabapentin, insulin glargine, insulin lispro, ipratropium, latanoprost, losartan, meloxicam, metoprolol succinate, metroNIDAZOLE, morphine, naloxone, omeprazole, polyethylene glycol, torsemide, and vitamin D. Allergies: Atenolol    Objective:     Vitals:    02/10/21 1444   BP: (!) 96/51   Pulse: 83   Resp: 20   Temp: 98.3 °F (36.8 °C)   TempSrc: Oral   Weight: 166 lb 3.2 oz (75.4 kg)   Height: 5' 10\" (1.778 m)     AAOx3, fatigued, NAD  No cellulitis, angitis, fluctuance  Very mild drape dermatitis, no cutaneous Candidiasis  No regional acute arthritis or bursitis  Blanca-ulcer skin: indurated, pink, warm and dry. Ulcer(s): slowly getting smaller and more granular, still some depth and mild undermining, small areas of fat necrosis, still a small area of central fascial-layer fibronecrosis, some fibrin and biofilm, serous exudate, no pus, no exposed bone, no malodor today. Photos also saved in electronic chart.     Today's wound measurements, per RN documentation:  Wound 20 #2, Sacrum, Pressure Injury, Stage 4, Onset 5/2020-Wound Length (cm): 4.3 cm(remeasured per MD)    Wound 12/18/20 #2, Sacrum, Pressure Injury, Stage 4, Onset 5/2020-Wound Width (cm): 2.2 cm    Wound 12/18/20 #2, Sacrum, Pressure Injury, Stage 4, Onset 5/2020-Wound Depth (cm): 2.5 cm    Assessment:     Patient Active Problem List   Diagnosis Code    Rheumatoid arthritis ([facility] [address] M06.9    Psoriasis L40.9    GERD (gastroesophageal reflux disease) K21.9    Chronic normocytic anemia D64.9    Cylindrical bronchiectasis ([facility]) J47.9    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    Immunocompromised state ([facility] D84.9    CAD in native artery I25.10    Stasis edema of both lower extremities I87.303    Essential hypertension, benign I10    Lumbar spondylosis M47.816    Mitral valve insufficiency and aortic valve insufficiency I08.0    Mixed hyperlipidemia E78.2    Myopia of both eyes H52.13    Osteoporosis M81.0    Other chronic sinusitis J32.8    Primary open angle glaucoma (POAG) of both eyes, mild stage H40.1131    Primary osteoarthritis of right hip M16.11    Type 2 diabetes mellitus with diabetic polyneuropathy, with long-term current use of insulin ([facility]) E11.42, Z79.4    Type 2 diabetes mellitus with unspecified diabetic retinopathy without macular edema ([facility]) E11.319    Uncontrolled type 2 diabetes mellitus with diabetic peripheral angiopathy without gangrene, with long-term current use of insulin ([facility]) E11.51, E11.65, Z79.4    Pressure ulcer of coccygeal region, stage 4 ([facility]) L89.154       Assessment of today's active condition(s): slowly healing stage 4 sacral pressure ulcer, no signs of infection or bone exposure; recently excised and debrided in the OR, doing fairly well with offloading, debridement and NPWT. Factors contributing to occurrence and/or persistence of the chronic ulcer include diabetes, chronic pressure, decreased mobility, shear force and immunosuppression. Medical necessity of today's visit is shown by the above documentation.  Aby Carranza debridement is indicated today, based upon the exam findings in the wound(s) above. Procedure note:     Consent obtained. Time out performed per [facility] policy. Anesthetic  Anesthetic: 4% Lidocaine Cream     Using a curette, I sharply debrided the sacrum ulcer(s) down through and including the removal of muscle/fascia. The type(s) of tissue debrided included fibrin, biofilm and necrotic/eschar. Total Surface Area Debrided: 8 sq cm. The ulcers were then irrigated with normal saline solution. The procedure was completed with a small amount of bleeding, and hemostasis was with pressure. The patient tolerated the procedure well, with no significant complications. The patient's level of pain during and after the procedure was monitored. Post-debridement measurements, if different from pre-debridement, are in the flowsheet as well. Discharge plan:     Treatment in the wound care center today, per RN documentation: Wound 12/18/20 #2, Sacrum, Pressure Injury, Stage 4, Onset 5/2020-Dressing/Treatment: (flagyl,NPWT). Keep focused on offloading, staying relatively active, frequent position changes, protein intake. Leg elevation and mild tubular bandage compression for swelling there. Home treatment: good handwashing before and after any dressing changes. Cleanse wound with saline or soap & water before dressing change. May use Vaseline (petrolatum), Aquaphor, Aveeno, CeraVe, Cetaphil, Eucerin, Lubriderm, etc for dry skin. Dressing type for home: Vashe, Flagyl powder, black foam, standard drape, continuous 150 mmHg negative pressure, three times weekly. Written discharge instructions given to patient. Follow up in 1 week.     Electronically signed by Milton Moreno MD on 2/11/2021 at 10:15 AM.

## 2021-02-17 ENCOUNTER — HOSPITAL ENCOUNTER (OUTPATIENT)
Dept: WOUND CARE | Age: 71
Discharge: HOME | End: 2021-02-17
Payer: MEDICARE

## 2021-02-17 VITALS
HEIGHT: 70 IN | DIASTOLIC BLOOD PRESSURE: 49 MMHG | HEART RATE: 83 BPM | BODY MASS INDEX: 23.48 KG/M2 | SYSTOLIC BLOOD PRESSURE: 99 MMHG | RESPIRATION RATE: 18 BRPM | WEIGHT: 164 LBS | TEMPERATURE: 98 F

## 2021-02-17 PROCEDURE — 97605 NEG PRS WND THER DME<=50SQCM: CPT

## 2021-02-17 PROCEDURE — 11043 DBRDMT MUSC&/FSCA 1ST 20/<: CPT | Performed by: INTERNAL MEDICINE

## 2021-02-17 PROCEDURE — 11043 DBRDMT MUSC&/FSCA 1ST 20/<: CPT

## 2021-02-17 RX ORDER — LIDOCAINE 40 MG/G
CREAM TOPICAL ONCE
Status: DISCONTINUED | OUTPATIENT
Start: 2021-02-17 | End: 2021-02-18 | Stop reason: HOSPADM

## 2021-02-17 NOTE — PLAN OF CARE
Wound stable, debridement per MD & pt. Tolerated well. Will cont. With NPWT as ordered. Pt. Is cont. With frequent repositioning & off-loading. Also reinforced importance of cont. With good protein intake to assist with wound healing. F/u in [facility] in 1 week as ordered. Discharge instructions reviewed with patient, all questions answered, copy given to patient. Dressings were applied to all wounds per M.D. Instructions at this visit.

## 2021-02-18 NOTE — PROGRESS NOTES
regional acute arthritis or bursitis  Blanca-ulcer skin: indurated, pink and warm. Ulcer(s): a bit smaller, mostly pink-red and granular, a bit of fibrin and biofilm, still a couple of areas of SQ and fascial fibronecrotic tissue, minor lateral undermining, no pus, no exposed bone. Photos also saved in electronic chart.     Today's wound measurements, per RN documentation:  Wound 12/18/20 #2, Sacrum, Pressure Injury, Stage 4, Onset 5/2020-Wound Length (cm): 4 cm    Wound 12/18/20 #2, Sacrum, Pressure Injury, Stage 4, Onset 5/2020-Wound Width (cm): 2 cm    Wound 12/18/20 #2, Sacrum, Pressure Injury, Stage 4, Onset 5/2020-Wound Depth (cm): 2.3 cm    Assessment:     Patient Active Problem List   Diagnosis Code    Rheumatoid arthritis ([facility] M06.9    Psoriasis L40.9    GERD (gastroesophageal reflux disease) K21.9    Chronic normocytic anemia D64.9    Cylindrical bronchiectasis ([facility]) J47.9    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    Immunocompromised state ([facility] D84.9    CAD in native artery I25.10    Stasis edema of both lower extremities I87.303    Essential hypertension, benign I10    Lumbar spondylosis M47.816    Mitral valve insufficiency and aortic valve insufficiency I08.0    Mixed hyperlipidemia E78.2    Myopia of both eyes H52.13    Osteoporosis M81.0    Other chronic sinusitis J32.8    Primary open angle glaucoma (POAG) of both eyes, mild stage H40.1131    Primary osteoarthritis of right hip M16.11    Type 2 diabetes mellitus with diabetic polyneuropathy, with long-term current use of insulin ([facility]) E11.42, Z79.4    Type 2 diabetes mellitus with unspecified diabetic retinopathy without macular edema ([facility]) E11.319    Uncontrolled type 2 diabetes mellitus with diabetic peripheral angiopathy without gangrene, with long-term current use of insulin ([facility]) E11.51, E11.65, Z79.4    Pressure ulcer of coccygeal region, stage 4 ([facility]) L89.154       Assessment of today's active condition(s): RA, Hx back surgery, a period of severely reduced mobility, development of stage 4 sacral pressure ulcer; better offloaded now, no signs of infection, excised in the OR, making some progress with debridements as needed, antisepsis and NPWT. Factors contributing to occurrence and/or persistence of the chronic ulcer include diabetes, chronic pressure, decreased mobility, shear force and immunosuppression. Medical necessity of today's visit is shown by the above documentation. Sharp debridement is indicated today, based upon the exam findings in the wound(s) above. Procedure note:     Consent obtained. Time out performed per [facility] policy. Anesthetic  Anesthetic: 4% Lidocaine Cream     Using a curette, #15 blade scalpel and forceps, I sharply debrided the sacrum ulcer(s) down through and including the removal of muscle/fascia. The type(s) of tissue debrided included fibrin, biofilm and necrotic/eschar. Total Surface Area Debrided: 8 sq cm. The ulcers were then irrigated with normal saline solution. The procedure was completed with a small amount of bleeding, and hemostasis was with pressure. The patient tolerated the procedure well, with no significant complications. The patient's level of pain during and after the procedure was monitored. Post-debridement measurements, if different from pre-debridement, are in the flowsheet as well. Discharge plan:     Treatment in the wound care center today, per RN documentation: Wound 12/18/20 #2, Sacrum, Pressure Injury, Stage 4, Onset 5/2020-Dressing/Treatment: Other (comment)(flagyl, wound vac ). Keep focused on offloading, position changes, ROHO cushion use, protein intake, glucose control. Home treatment: good handwashing before and after any dressing changes. Cleanse wound with saline or soap & water before dressing change. May use Vaseline (petrolatum), Aquaphor, Aveeno, CeraVe, Cetaphil, Eucerin, Lubriderm, etc for dry skin.      Dressing type for home: Hypochlorous acid spray, Flagyl powder and NPWT (standard foam, bridge toward hip, standard drape, ostomy putty PRN, continuous negative pressure at 150 mmHg), three times weekly. Written discharge instructions given to patient. Follow up in 1 week.     Electronically signed by Magali Shore MD on 2/18/2021 at 10:57 AM.

## 2021-02-24 ENCOUNTER — HOSPITAL ENCOUNTER (OUTPATIENT)
Dept: WOUND CARE | Age: 71
Discharge: HOME | End: 2021-02-24
Payer: MEDICARE

## 2021-02-24 VITALS
TEMPERATURE: 98.7 F | RESPIRATION RATE: 18 BRPM | HEIGHT: 70 IN | WEIGHT: 167.8 LBS | BODY MASS INDEX: 24.02 KG/M2 | HEART RATE: 77 BPM | SYSTOLIC BLOOD PRESSURE: 117 MMHG | DIASTOLIC BLOOD PRESSURE: 55 MMHG

## 2021-02-24 PROCEDURE — 97605 NEG PRS WND THER DME<=50SQCM: CPT

## 2021-02-24 PROCEDURE — 11042 DBRDMT SUBQ TIS 1ST 20SQCM/<: CPT | Performed by: INTERNAL MEDICINE

## 2021-02-24 PROCEDURE — 11042 DBRDMT SUBQ TIS 1ST 20SQCM/<: CPT

## 2021-02-24 RX ORDER — LIDOCAINE 40 MG/G
CREAM TOPICAL PRN
Status: DISCONTINUED | OUTPATIENT
Start: 2021-02-24 | End: 2021-02-25 | Stop reason: HOSPADM

## 2021-02-24 ASSESSMENT — PAIN DESCRIPTION - FREQUENCY: FREQUENCY: INTERMITTENT

## 2021-02-24 ASSESSMENT — PAIN DESCRIPTION - DESCRIPTORS: DESCRIPTORS: ACHING

## 2021-02-24 ASSESSMENT — PAIN DESCRIPTION - PROGRESSION: CLINICAL_PROGRESSION: NOT CHANGED

## 2021-02-24 NOTE — PLAN OF CARE
Wound stable & showing some improvement, debridement per MD & pt. Tolerated well. Will cont. With current wound care regime with NPWT as ordered. [facility] changing dressings as ordered. Reinforced importance of protein intake & off-loading to assist with wound healing, pt. Verbalizes understanding. [facility] as ordered. Discharge instructions reviewed with patient, all questions answered, copy given to patient. Dressings were applied to all wounds per M.D. Instructions at this visit.

## 2021-02-26 NOTE — PROGRESS NOTES
88 [facility] Progress Note    Sosa Dougherty     : 1950    DATE OF VISIT:  2021    Subjective:     Sosa Dougherty is a 72 y.o. female who has a pressure ulcer located on the sacrum. Significant symptoms or pertinent wound history since last visit: in terms of the wound, things are going ok, not much pain at all, minor malodor, NPWT working well, she's staying well offloaded, trying to eat well. Having more of a headache and facial pain this week, prednisone dose increased, temporal artery biopsy being planned. As a result of that, her glucoses are higher (near 300 at times). Additional ulcer(s) noted? no. Leg remains healed. Her current medication list consists of Acapella, DULoxetine, Insulin Syringe-Needle U-100, Roflumilast, Teriparatide (Recombinant), albuterol sulfate HFA, aspirin EC, atorvastatin, azithromycin, blood glucose test strips, calcium carbonate, cetirizine, cyclobenzaprine, docusate sodium, etanercept, fluticasone, gabapentin, insulin glargine, insulin lispro, ipratropium, latanoprost, losartan, meloxicam, metoprolol succinate, metroNIDAZOLE (topical PRN), morphine, naloxone, omeprazole, polyethylene glycol, torsemide, and vitamin D. Prednisone went up from 4mg daily long-term, recently to 8mg daily because of increased shoulder pain, but now up to 20mg with a clinical suspicion of giant cell arteritis; it sounds like a temporal artery biopsy is planned, and depending on results, her Enbrel might be changed to Actemra.      Allergies: Atenolol    Objective:     Vitals:    21 0906   BP: (!) 117/55   Pulse: 77   Resp: 18   Temp: 98.7 °F (37.1 °C)   TempSrc: Oral   Weight: 167 lb 12.8 oz (76.1 kg)   Height: 5' 10\" (1.778 m)     AAOx3, fatigued, NAD  No cellulitis, angitis, fluctuance  No cutaneous Candidiasis, no serious contact dermatitis from VAC drape  No acute arthritis or bursitis in the hips or pelvic area, no allodynia around the ulcer  Blanca-ulcer skin: indurated, pink, warm and dry. Ulcer(s): steadily smaller, less deep fibrotic tissue in the center of the wound, a bit of fibronecrotic SQ tissue, some fibrin and biofilm, serous exudate, no bone exposure, no pus, some lateral undermining, but that varies depending on what position she lies. Photos also saved in electronic chart.     Today's wound measurements, per RN documentation:  Wound 12/18/20 #2, Sacrum, Pressure Injury, Stage 4, Onset 5/2020-Wound Length (cm): 3.8 cm    Wound 12/18/20 #2, Sacrum, Pressure Injury, Stage 4, Onset 5/2020-Wound Width (cm): 2 cm    Wound 12/18/20 #2, Sacrum, Pressure Injury, Stage 4, Onset 5/2020-Wound Depth (cm): 1.8 cm    Assessment:     Patient Active Problem List   Diagnosis Code    Rheumatoid arthritis ([facility]) M06.9    Psoriasis L40.9    GERD (gastroesophageal reflux disease) K21.9    Chronic normocytic anemia D64.9    Cylindrical bronchiectasis ([facility]) J47.9    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    Immunocompromised state ([facility] [address] D84.9    CAD in native artery I25.10    Stasis edema of both lower extremities I87.303    Essential hypertension, benign I10    Lumbar spondylosis M47.816    Mitral valve insufficiency and aortic valve insufficiency I08.0    Mixed hyperlipidemia E78.2    Myopia of both eyes H52.13    Osteoporosis M81.0    Other chronic sinusitis J32.8    Primary open angle glaucoma (POAG) of both eyes, mild stage H40.1131    Primary osteoarthritis of right hip M16.11    Type 2 diabetes mellitus with diabetic polyneuropathy, with long-term current use of insulin ([facility]) E11.42, Z79.4    Type 2 diabetes mellitus with unspecified diabetic retinopathy without macular edema ([facility]) E11.319    Uncontrolled type 2 diabetes mellitus with diabetic peripheral angiopathy without gangrene, with long-term current use of insulin ([facility]) E11.51, E11.65, Z79.4    Pressure ulcer of coccygeal region, stage 4 ([facility]) L89.154       Assessment of today's active condition(s): RA, decreased mobility, stage 4 sacral pressure ulcer, no soft tissue infection, no Hx of bone exposure - excised in the OR, debrided as needed, offloaded well, making some better progress with NPWT. Factors contributing to occurrence and/or persistence of the chronic ulcer include diabetes, chronic pressure, decreased mobility, shear force and immunosuppression. Medical necessity of today's visit is shown by the above documentation. Sharp debridement is indicated today, based upon the exam findings in the wound(s) above. Procedure note:     Consent obtained. Time out performed per [facility] policy. Anesthetic  Anesthetic: 4% Lidocaine Cream     Using a curette, I sharply debrided the sacrum ulcer(s) down through and including the removal of subcutaneous tissue. The type(s) of tissue debrided included fibrin, biofilm, slough and necrotic/eschar. Total Surface Area Debrided: 8 sq cm. The ulcers were then irrigated with normal saline solution. The procedure was completed with a small amount of bleeding, and hemostasis was with pressure. The patient tolerated the procedure well, with no significant complications. The patient's level of pain during and after the procedure was monitored. Post-debridement measurements, if different from pre-debridement, are in the flowsheet as well. Discharge plan:     Treatment in the wound care center today, per RN documentation: Wound 12/18/20 #2, Sacrum, Pressure Injury, Stage 4, Onset 5/2020-Dressing/Treatment: Other (comment)(flagyl, wound vac applied ). Keep focused on protein intake, glucose control, offloading (frequent position changes, good transfer technique, ROHO cushion use). Not really a lot we can do about the immune suppression right now - she knows that could slow down healing, but I think in the longer term, she'll still do ok. Home treatment: good handwashing before and after any dressing changes.  Cleanse wound with saline or soap & water before dressing change. May use Vaseline (petrolatum), Aquaphor, Aveeno, CeraVe, Cetaphil, Eucerin, Lubriderm, etc for dry skin. Dressing type for home: Hypochlorous acid spray, Flagyl powder and NPWT (black foam, standard drape, 150 mmHg continuous negative pressure), three times weekly. Written discharge instructions given to patient. Follow up in 1 week.     Electronically signed by Ten Pizarro MD on 2/26/2021 at 9:11 AM.

## 2021-03-03 ENCOUNTER — HOSPITAL ENCOUNTER (OUTPATIENT)
Dept: WOUND CARE | Age: 71
Discharge: HOME | End: 2021-03-03
Payer: MEDICARE

## 2021-03-03 VITALS
WEIGHT: 166.8 LBS | DIASTOLIC BLOOD PRESSURE: 71 MMHG | HEART RATE: 85 BPM | RESPIRATION RATE: 18 BRPM | HEIGHT: 70 IN | BODY MASS INDEX: 23.88 KG/M2 | TEMPERATURE: 98.5 F | SYSTOLIC BLOOD PRESSURE: 129 MMHG

## 2021-03-03 PROCEDURE — 11042 DBRDMT SUBQ TIS 1ST 20SQCM/<: CPT

## 2021-03-03 PROCEDURE — 11042 DBRDMT SUBQ TIS 1ST 20SQCM/<: CPT | Performed by: INTERNAL MEDICINE

## 2021-03-03 PROCEDURE — 97605 NEG PRS WND THER DME<=50SQCM: CPT

## 2021-03-03 RX ORDER — LIDOCAINE 40 MG/G
CREAM TOPICAL ONCE
Status: DISCONTINUED | OUTPATIENT
Start: 2021-03-03 | End: 2021-03-04 | Stop reason: HOSPADM

## 2021-03-03 ASSESSMENT — PAIN SCALES - GENERAL: PAINLEVEL_OUTOF10: 0

## 2021-03-03 NOTE — PLAN OF CARE
Wound stable, debridement per MD & pt. Tolerated well. Will cont. With current wound care regime with NPWT as ordered. Pt. States she is doing her best with off-loading. Pt. Also states she is doing a good job about getting protein in her diet & supplements with boost. F/u in [facility] in 1 week as ordered. Discharge instructions reviewed with patient, all questions answered, copy given to patient. Dressings were applied to all wounds per M.D. Instructions at this visit.

## 2021-03-04 NOTE — PROGRESS NOTES
88 [facility] Progress Note    Jovita Kim     : 1950    DATE OF VISIT:  3/3/2021    Subjective:     Jovita Kim is a 72 y.o. female who has a pressure ulcer located on the sacrum. Significant symptoms or pertinent wound history since last visit: in terms of the wound, doing well, little pain, moderate drainage, no pruritus, VAC working well, no fever. Had her temporal artery biopsy on Monday, and was told that results are negative, but it sounds like clinical suspicion was rather high, so she may yet be placed on new immunosuppressive medication by Dr. Errol Mayers. Prednisone back down to 10 mg daily now, plan to go back down to baseline 4 mg soon. Glucoses doing better with extra short-acting insulin as needed. Additional ulcer(s) noted? no.      Her current medication list consists of Acapella, DULoxetine, Insulin Syringe-Needle U-100, Roflumilast, Teriparatide (Recombinant), albuterol sulfate HFA, aspirin EC, atorvastatin, azithromycin, blood glucose test strips, calcium carbonate, cetirizine, cyclobenzaprine, docusate sodium, etanercept, fluticasone, gabapentin, insulin glargine, insulin lispro, ipratropium, latanoprost, losartan, meloxicam, metoprolol succinate, metroNIDAZOLE, morphine, naloxone, omeprazole, polyethylene glycol, torsemide, and vitamin D. Allergies: Atenolol    Objective:     Vitals:    21 0917   BP: 129/71   Pulse: 85   Resp: 18   Temp: 98.5 °F (36.9 °C)   TempSrc: Oral   Weight: 166 lb 12.8 oz (75.7 kg)   Height: 5' 10\" (1.778 m)     AAOx3, fatigued, NAD  No cellulitis, angitis, fluctuance  No regional acute arthritis or bursitis  No cutaneous Candidiasis, but a bit of patchy contact dermatitis from VAC drape this week - not symptomatic  Blanca-ulcer skin: indurated, pink, warm and contact dermatitis.   Ulcer(s): slowly smaller, mostly granular, small patches of SQ fibronecrosis, some fibrin and biofilm, still a good deal of undermining, vanita superior-lateral. Photos also saved in electronic chart. Today's wound measurements, per RN documentation:  Wound 12/18/20 #2, Sacrum, Pressure Injury, Stage 4, Onset 5/2020-Wound Length (cm): 3 cm    Wound 12/18/20 #2, Sacrum, Pressure Injury, Stage 4, Onset 5/2020-Wound Width (cm): 2.3 cm    Wound 12/18/20 #2, Sacrum, Pressure Injury, Stage 4, Onset 5/2020-Wound Depth (cm): 1.6 cm    Assessment:     Patient Active Problem List   Diagnosis Code    Rheumatoid arthritis ([facility] M06.9    Psoriasis L40.9    GERD (gastroesophageal reflux disease) K21.9    Chronic normocytic anemia D64.9    Cylindrical bronchiectasis ([facility]) J47.9    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    Immunocompromised state ([facility] D84.9    CAD in native artery I25.10    Stasis edema of both lower extremities I87.303    Essential hypertension, benign I10    Lumbar spondylosis M47.816    Mitral valve insufficiency and aortic valve insufficiency I08.0    Mixed hyperlipidemia E78.2    Myopia of both eyes H52.13    Osteoporosis M81.0    Other chronic sinusitis J32.8    Primary open angle glaucoma (POAG) of both eyes, mild stage H40.1131    Primary osteoarthritis of right hip M16.11    Type 2 diabetes mellitus with diabetic polyneuropathy, with long-term current use of insulin ([facility]) E11.42, Z79.4    Type 2 diabetes mellitus with unspecified diabetic retinopathy without macular edema ([facility]) E11.319    Uncontrolled type 2 diabetes mellitus with diabetic peripheral angiopathy without gangrene, with long-term current use of insulin ([facility]) E11.51, E11.65, Z79.4    Pressure ulcer of coccygeal region, stage 4 ([facility]) L89.154       Assessment of today's active condition(s): RA, poor mobility, development of stage 4 pressure ulcer of sacrum, no active infection, excised in OR, debrided PRN, making pretty good healing progress with VAC.  Factors contributing to occurrence and/or persistence of the chronic ulcer include diabetes, chronic pressure, decreased mobility, shear force and immunosuppression. Medical necessity of today's visit is shown by the above documentation. Sharp debridement is indicated today, based upon the exam findings in the wound(s) above. Procedure note:     Consent obtained. Time out performed per [facility] policy. Anesthetic  Anesthetic: 4% Lidocaine Cream     Using a curette, I sharply debrided the sacrum ulcer(s) down through and including the removal of subcutaneous tissue. The type(s) of tissue debrided included fibrin, biofilm and necrotic/eschar. Total Surface Area Debrided: 7 sq cm. The ulcers were then irrigated with normal saline solution. The procedure was completed with a small amount of bleeding, and hemostasis was with pressure. The patient tolerated the procedure well, with no significant complications. The patient's level of pain during and after the procedure was monitored. Post-debridement measurements, if different from pre-debridement, are in the flowsheet as well. Discharge plan:     Treatment in the wound care center today, per RN documentation: Wound 12/18/20 #2, Sacrum, Pressure Injury, Stage 4, Onset 5/2020-Dressing/Treatment: (flagyl, NPWT). Keep focused on offloading, protein intake, glucose control. Home treatment: good handwashing before and after any dressing changes. Cleanse wound with saline or soap & water before dressing change. May use Vaseline (petrolatum), Aquaphor, Aveeno, CeraVe, Cetaphil, Eucerin, Lubriderm, etc for dry skin. Dressing type for home: Hypochlorous acid spray, Flagyl powder and NPWT (standard foam, be sure not to overpack the underming, bridge to hip, standard drape, 150 mmHg continuous negative pressure), three times weekly. Written discharge instructions given to patient. Follow up in 1 week.     Electronically signed by Perez Maradiaga MD on 3/4/2021 at 2:01 PM.

## 2021-03-09 RX ORDER — LIDOCAINE 50 MG/G
OINTMENT TOPICAL ONCE
Status: CANCELLED | OUTPATIENT
Start: 2021-03-10 | End: 2021-03-10

## 2021-03-09 RX ORDER — LIDOCAINE 40 MG/G
CREAM TOPICAL ONCE
Status: CANCELLED | OUTPATIENT
Start: 2021-03-10 | End: 2021-03-10

## 2021-03-09 RX ORDER — BETAMETHASONE DIPROPIONATE 0.05 %
OINTMENT (GRAM) TOPICAL ONCE
Status: CANCELLED | OUTPATIENT
Start: 2021-03-10 | End: 2021-03-10

## 2021-03-09 RX ORDER — GINSENG 100 MG
CAPSULE ORAL ONCE
Status: CANCELLED | OUTPATIENT
Start: 2021-03-10 | End: 2021-03-10

## 2021-03-09 RX ORDER — LIDOCAINE HYDROCHLORIDE 20 MG/ML
JELLY TOPICAL ONCE
Status: CANCELLED | OUTPATIENT
Start: 2021-03-10 | End: 2021-03-10

## 2021-03-09 RX ORDER — BACITRACIN ZINC AND POLYMYXIN B SULFATE 500; 1000 [USP'U]/G; [USP'U]/G
OINTMENT TOPICAL ONCE
Status: CANCELLED | OUTPATIENT
Start: 2021-03-10 | End: 2021-03-10

## 2021-03-09 RX ORDER — BACITRACIN, NEOMYCIN, POLYMYXIN B 400; 3.5; 5 [USP'U]/G; MG/G; [USP'U]/G
OINTMENT TOPICAL ONCE
Status: CANCELLED | OUTPATIENT
Start: 2021-03-10 | End: 2021-03-10

## 2021-03-09 RX ORDER — CLOBETASOL PROPIONATE 0.5 MG/G
OINTMENT TOPICAL ONCE
Status: CANCELLED | OUTPATIENT
Start: 2021-03-10 | End: 2021-03-10

## 2021-03-09 RX ORDER — GENTAMICIN SULFATE 1 MG/G
OINTMENT TOPICAL ONCE
Status: CANCELLED | OUTPATIENT
Start: 2021-03-10 | End: 2021-03-10

## 2021-03-09 RX ORDER — LIDOCAINE HYDROCHLORIDE 40 MG/ML
SOLUTION TOPICAL ONCE
Status: CANCELLED | OUTPATIENT
Start: 2021-03-10 | End: 2021-03-10

## 2021-03-10 ENCOUNTER — HOSPITAL ENCOUNTER (OUTPATIENT)
Dept: WOUND CARE | Age: 71
Discharge: HOME | End: 2021-03-10
Payer: MEDICARE

## 2021-03-10 VITALS
RESPIRATION RATE: 16 BRPM | HEART RATE: 86 BPM | WEIGHT: 163.4 LBS | SYSTOLIC BLOOD PRESSURE: 142 MMHG | BODY MASS INDEX: 23.39 KG/M2 | TEMPERATURE: 98.7 F | HEIGHT: 70 IN | DIASTOLIC BLOOD PRESSURE: 61 MMHG

## 2021-03-10 DIAGNOSIS — R22.41 SUBCUTANEOUS NODULE OF RIGHT FOOT: ICD-10-CM

## 2021-03-10 PROCEDURE — 97605 NEG PRS WND THER DME<=50SQCM: CPT

## 2021-03-10 PROCEDURE — 11042 DBRDMT SUBQ TIS 1ST 20SQCM/<: CPT | Performed by: INTERNAL MEDICINE

## 2021-03-10 PROCEDURE — 11042 DBRDMT SUBQ TIS 1ST 20SQCM/<: CPT

## 2021-03-10 RX ORDER — LIDOCAINE 40 MG/G
CREAM TOPICAL ONCE
Status: DISCONTINUED | OUTPATIENT
Start: 2021-03-10 | End: 2021-03-11 | Stop reason: HOSPADM

## 2021-03-10 NOTE — PLAN OF CARE
New nodule noted on right dorsal foot that pt. States has been present for a few weeks. Dr Elizabeth Hernández advised pt. To return to Dr. Dre Mac whom she has seen in the past for podiatry. Sacral wound stable, debridement per MD & pt. Tolerated well. Will cont. With current wound care regime with NPWT as ordered. Pt. Is cont. To off-load as ordered. F/u in [address] [place] [address] in 1 week as ordered. Discharge instructions reviewed with patient, all questions answered, copy given to patient. Dressings were applied to all wounds per M.D. Instructions at this visit.

## 2021-03-10 NOTE — PLAN OF CARE
[address] [facility] Physician Orders and Discharge [address], [address]  [place], [facility]  Telephone: [phone]      Fax: [phone] home care company:   [facility]     Your wound-care supplies will be provided by: We are ordering your wound-care supplies from [employer]. Please note, depending on your insurance coverage, you may have out-of-pocket expenses for these supplies. Someone from [employer] should call you to confirm your order and discuss those potential costs before they ship your products -- please anticipate that call. If your out-of-pocket cost could be substantial, [facility] has a financial hardship program for patients who qualify, so please ask about that if you might need a hand. If you have any questions about your supplies or your potential out-of-pocket costs, or if you need to place an order for a refill of supplies (typically monthly), please call [phone]. NAME:  Oseas Taveras   YOB: 1950  PRIMARY DIAGNOSIS FOR WOUND CARE CENTER:  Pressure ulcer     Wound cleansing:   Do not scrub or use excessive force. Wash hands with soap and water before and after dressing changes. Prior to applying a clean dressing, cleanse wound with normal saline, wound cleanser, or mild soap and water. Ask your physician or nurse before getting the wound(s) wet in the shower.                 Wound care for home:     Sacral Wound:  Vashe or Hypochlorous acid soaked gauze applied to wound for 2-3 minutes, no need to rinse  Flagyl crushed & sprinkled on wound bed as needed for malodor  Stoma paste or Stoma barrier ring to distal area of wound to help obtain a better seal & prevent air leak  Drape to maegan-wound & under any foam for bridge  Black foam to wound & bridged to left hip (MAKE SURE NOT TO OVER FILL UNDERMINED AREA WITH BLACK FOAM)  Cover with drape  Pressure -150mmHg continuous  [facility] to change dressing on Friday & Monday, Wound Care will change during visits on Wednesday        Please note, all wounds (unless stated otherwise here) were mechanically debrided at the time of cleansing here in the wound-care center today, so a small amount of pain, drainage or bleeding from that process might be expected, and is normal.      All products for home use, including multiple products for a single wound if applicable, are medically necessary in order to achieve the best chance at timely wound healing. See provider documentation for details if needed. Substituted dressings applied in the [facility] today, if applicable:     N/a     New orders for this week (labs, imaging, medications, etc.):       Contact Dr. Angel Gann, podiatry re: nodule on the top of your right foot. Call to schedule an appt. At your earliest convenience. Home Care to order dressing supplies through [employer] when needed. May contact the local rep. For any questions or to order supplies  Lonnie Jones, # [phone]       Additional instructions for specific diagnoses:     +ROHO cushion- use at [address] when sitting!!      General comments for pressure ulcers: *  Make sure you stay well-hydrated, and maintain good protein intake. *  Reposition at least every two hours, to keep from having pressure in one spot for too long. *  If you are not sure whether you have the best offloading surfaces (mattress, mattress overlay, chair cushion, heel-protector boots, etc), please ask. *  Moisturize your skin regularly with Vaseline, Aquaphor, Aveeno, CeraVe, Cetaphil, Eucerin, Lubriderm, etc; but keep the skin between your toes dry. *  If you smoke, your wound can not heal properly -- please talk with us when you're ready to quit.          F/U Appointment is with Dr. Art Collins in 1 week on                                                at                       .     Your nurse  is ABRAHAM Zambrano      If we applied slip-resistant hospital socks today, be sure to remove them at least once a day to inspect your toes or feet, even if you're not changing the wraps or dressings underneath. If you see anything concerning (redness, excess moisture, etc), please call and let us know right away.      Should you experience any significant changes in your wound(s) (including redness, increased warmth, increased pain, increased drainage, odor, or fever) or have questions about your wound care, please contact the [address] [place] [address] at [phone] Monday-Thursday from 8:00 am - 4:30 pm, or Friday from 8:00 am - 2:30 pm.  If you need help with your wound outside these hours and cannot wait until we are again available, contact your home-care company (if applicable), your PCP, or go to the nearest emergency room

## 2021-03-11 NOTE — PROGRESS NOTES
[facility] Progress Note    Brittany Wells     : 1950    DATE OF VISIT:  3/10/2021    Subjective:     Brittany Wells is a 72 y.o. female who has a pressure ulcer located on the sacrum. Significant symptoms or pertinent wound history since last visit: feeling ok, mild occasional pain. No fever. Back to her baseline dose of prednisone. NPWT working well, but her home-care company is having a hard time getting ongoing home visits approved by her insurance for some reason. Last time the NPWT was placed, it looks like one area of foam was on her skin, with no drape beneath. Additional ulcer(s) noted? no.  But she did point out a tender, raised lesion on her right foot, first noticed a couple of weeks ago, no antecedent trauma, and it's near the proximal end of an old surgical scar (bunionectomy?). Her current medication list consists of Acapella, DULoxetine, Insulin Syringe-Needle U-100, Roflumilast, Teriparatide (Recombinant), albuterol sulfate HFA, aspirin EC, atorvastatin, azithromycin, blood glucose test strips, calcium carbonate, cetirizine, cyclobenzaprine, docusate sodium, etanercept, fluticasone, gabapentin, insulin glargine, insulin lispro, ipratropium, latanoprost, losartan, meloxicam, metoprolol succinate, metroNIDAZOLE, morphine, naloxone, omeprazole, polyethylene glycol, torsemide, and vitamin D. Allergies: Atenolol    Objective:     Vitals:    03/10/21 0915   BP: (!) 142/61   Pulse: 86   Resp: 16   Temp: 98.7 °F (37.1 °C)   TempSrc: Oral   Weight: 163 lb 6.4 oz (74.1 kg)   Height: 5' 10\" (1.778 m)     AAOx3, fatigued, NAD  No cellulitis, angitis, fluctuance  No cutaneous Candidiasis; one small area of contact dermatitis from NPWT foam on her skin, superior to the ulcer  Right dorsal foot with a small (1 cm) pink to light red raised area, over the proximal 1st ray, mildly tender, quite firm, not fluctuant  Blanca-ulcer skin: indurated, pink and warm.   Ulcer(s): slowly smaller, probably 90% red and granular, small foci of SQ fibronecrotic tissue, no exposed bone, still some undermining, mostly superior and lateral, and a layer of fibrin and biofilm. Photos also saved in electronic chart.     Today's wound measurements, per RN documentation:  Wound 12/18/20 #2, Sacrum, Pressure Injury, Stage 4, Onset 5/2020-Wound Length (cm): 3.8 cm    Wound 12/18/20 #2, Sacrum, Pressure Injury, Stage 4, Onset 5/2020-Wound Width (cm): 2.8 cm    Wound 12/18/20 #2, Sacrum, Pressure Injury, Stage 4, Onset 5/2020-Wound Depth (cm): 1.5 cm    Assessment:     Patient Active Problem List   Diagnosis Code    Rheumatoid arthritis ([facility] M06.9    Psoriasis L40.9    GERD (gastroesophageal reflux disease) K21.9    Chronic normocytic anemia D64.9    Cylindrical bronchiectasis ([facility]) J47.9    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    Immunocompromised state ([facility] D84.9    CAD in native artery I25.10    Stasis edema of both lower extremities I87.303    Essential hypertension, benign I10    Lumbar spondylosis M47.816    Mitral valve insufficiency and aortic valve insufficiency I08.0    Mixed hyperlipidemia E78.2    Myopia of both eyes H52.13    Osteoporosis M81.0    Other chronic sinusitis J32.8    Primary open angle glaucoma (POAG) of both eyes, mild stage H40.1131    Primary osteoarthritis of right hip M16.11    Type 2 diabetes mellitus with diabetic polyneuropathy, with long-term current use of insulin ([facility]) E11.42, Z79.4    Type 2 diabetes mellitus with unspecified diabetic retinopathy without macular edema ([facility]) E11.319    Uncontrolled type 2 diabetes mellitus with diabetic peripheral angiopathy without gangrene, with long-term current use of insulin ([facility]) E11.51, E11.65, Z79.4    Pressure ulcer of coccygeal region, stage 4 ([facility]) L89.154       Assessment of today's active condition(s): RA, poor mobility, Hx of surgery, even more limited mobility after that, small sacral-coccyx pressure ulcer, but with considerable undermining -- excised in the OR, no signs of infection, making slow but steady progress with offloading, debridements as needed, and NPWT. Not sure what to make of the foot nodule - does not have the texture of an abscess or hematoma; not sure if a rheumatoid nodule, perhaps a very firm synovial cyst? Factors contributing to occurrence and/or persistence of the chronic ulcer include diabetes, chronic pressure, decreased mobility, shear force and immunosuppression. Medical necessity of today's visit is shown by the above documentation. Sharp debridement is indicated today, based upon the exam findings in the wound(s) above. Procedure note:     Consent obtained. Time out performed per [address] arzate. Anesthetic  Anesthetic: 4% Lidocaine Cream     Using a curette, I sharply debrided the sacrum ulcer(s) down through and including the removal of subcutaneous tissue. The type(s) of tissue debrided included fibrin, biofilm and necrotic/eschar. Total Surface Area Debrided: 10 sq cm. The ulcers were then irrigated with normal saline solution. The procedure was completed with a small amount of bleeding, and hemostasis was with pressure. The patient tolerated the procedure well, with no significant complications. The patient's level of pain during and after the procedure was monitored. Post-debridement measurements, if different from pre-debridement, are in the flowsheet as well. Discharge plan:     Treatment in the wound care center today, per RN documentation: Wound 12/18/20 #2, Sacrum, Pressure Injury, Stage 4, Onset 5/2020-Dressing/Treatment: (flagyl, NPWT). Keep working on diabetic diet, protein intake, offloading (frequent position changes, use of ROHO cushion when seated; lies on her side in bed). I asked her to contact her podiatrist (Dr. Kailyn Olvera) about that foot nodule -- I'll send her a quick summary of what I saw today, and a photo or two.  If it changes rapidly in the meantime, contact me or her PCP. Home treatment: good handwashing before and after any dressing changes. Cleanse wound with saline or soap & water before dressing change. May use Vaseline (petrolatum), Aquaphor, Aveeno, CeraVe, Cetaphil, Eucerin, Lubriderm, etc for dry skin. Dressing type for home: Hypochlorous acid spray, Flagyl powder and NPWT (black foam, bridge toward hip (over other piece of drape), 150 mmHg continuous negative pressure), three times weekly. Written discharge instructions given to patient. Follow up in 1 week.     Electronically signed by Angeles Jennings MD on 3/10/2021 at 8:04 PM.

## 2021-03-17 ENCOUNTER — HOSPITAL ENCOUNTER (OUTPATIENT)
Dept: WOUND CARE | Age: 71
Discharge: HOME | End: 2021-03-17
Payer: MEDICARE

## 2021-03-17 VITALS
BODY MASS INDEX: 23.68 KG/M2 | SYSTOLIC BLOOD PRESSURE: 146 MMHG | TEMPERATURE: 97.5 F | RESPIRATION RATE: 18 BRPM | DIASTOLIC BLOOD PRESSURE: 65 MMHG | WEIGHT: 165.4 LBS | HEART RATE: 76 BPM | HEIGHT: 70 IN

## 2021-03-17 DIAGNOSIS — L89.154 PRESSURE ULCER OF COCCYGEAL REGION, STAGE 4 (HCC): ICD-10-CM

## 2021-03-17 PROCEDURE — 11042 DBRDMT SUBQ TIS 1ST 20SQCM/<: CPT | Performed by: INTERNAL MEDICINE

## 2021-03-17 PROCEDURE — 97605 NEG PRS WND THER DME<=50SQCM: CPT

## 2021-03-17 PROCEDURE — 11042 DBRDMT SUBQ TIS 1ST 20SQCM/<: CPT

## 2021-03-17 RX ORDER — LIDOCAINE 40 MG/G
CREAM TOPICAL ONCE
Status: DISCONTINUED | OUTPATIENT
Start: 2021-03-17 | End: 2021-03-18 | Stop reason: HOSPADM

## 2021-03-17 NOTE — PLAN OF CARE
Wound stable, debridement per MD & pt. Tolerated well. Will cont. With current wound care regime with NPWT as ordered. Reinforced importance of continuing with good off-loading. Pt. Continuing with protein supplement drinks for nutrition. F/u in [address] [place] [address],[address] in 1 week as ordered. Discharge instructions reviewed with patient, all questions answered, copy given to patient. Dressings were applied to all wounds per M.D. Instructions at this visit.

## 2021-03-18 NOTE — PROGRESS NOTES
88 [facility] Progress Note    Paulino Nageotte     : 1950    DATE OF VISIT:  3/17/2021    Subjective:     Paulino Nageotte is a 72 y.o. female who has a pressure ulcer located on the sacrum. Significant symptoms or pertinent wound history since last visit: feeling ok overall, some arthritic pains flaring up a bit with the change in weather. No F/C/D, no problems with NPWT (apart from some dressing leak at times). Hasn't called her podiatrist yet about that tender foot nodule. Additional ulcer(s) noted? no.      Her current medication list consists of Acapella, DULoxetine, Insulin Syringe-Needle U-100, Roflumilast, Teriparatide (Recombinant), albuterol sulfate HFA, aspirin EC, atorvastatin, azithromycin, blood glucose test strips, calcium carbonate, cetirizine, cyclobenzaprine, docusate sodium, etanercept, fluticasone, gabapentin, insulin glargine, insulin lispro, ipratropium, latanoprost, losartan, meloxicam, metoprolol succinate, metroNIDAZOLE, morphine, naloxone, omeprazole, polyethylene glycol, torsemide, and vitamin D. Allergies: Atenolol    Objective:     Vitals:    21 0927   BP: (!) 146/65   Pulse: 76   Resp: 18   Temp: 97.5 °F (36.4 °C)   TempSrc: Oral   Weight: 165 lb 6.4 oz (75 kg)   Height: 5' 10\" (1.778 m)     AAOx3, fatigued, NAD  No cellulitis, angitis, fluctuance  No cutaneous Candidiasis or contact dermatitis  No regional acute arthritis or bursitis  Blanca-ulcer skin: indurated, pink, warm and dry. Ulcer(s): steadily smaller, nearly all granular, few small foci of SQ fibronecrosis, no visible fascia or bone, some usual fibrin, biofilm, serous exudate, still some undermining but I think that IS filling in, just about keeping pace with the actual wound measurements closing in. Photos also saved in electronic chart.     Today's wound measurements, per RN documentation:  Wound 20 #2, Sacrum, Pressure Injury, Stage 4, Onset 2020-Wound Length (cm): 3.5 cm    Wound 12/18/20 #2, Sacrum, Pressure Injury, Stage 4, Onset 5/2020-Wound Width (cm): 2.5 cm    Wound 12/18/20 #2, Sacrum, Pressure Injury, Stage 4, Onset 5/2020-Wound Depth (cm): 1.9 cm    Assessment:     Patient Active Problem List   Diagnosis Code    Rheumatoid arthritis (HCC) M06.9    Psoriasis L40.9    GERD (gastroesophageal reflux disease) K21.9    Chronic normocytic anemia D64.9    Cylindrical bronchiectasis (HCC) J47.9    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    Immunocompromised state ([facility] D84.9    CAD in native artery I25.10    Stasis edema of both lower extremities I87.303    Essential hypertension, benign I10    Lumbar spondylosis M47.816    Mitral valve insufficiency and aortic valve insufficiency I08.0    Mixed hyperlipidemia E78.2    Myopia of both eyes H52.13    Osteoporosis M81.0    Other chronic sinusitis J32.8    Primary open angle glaucoma (POAG) of both eyes, mild stage H40.1131    Primary osteoarthritis of right hip M16.11    Type 2 diabetes mellitus with diabetic polyneuropathy, with long-term current use of insulin ([facility]) E11.42, Z79.4    Type 2 diabetes mellitus with unspecified diabetic retinopathy without macular edema ([facility]) E11.319    Uncontrolled type 2 diabetes mellitus with diabetic peripheral angiopathy without gangrene, with long-term current use of insulin ([facility]) E11.51, E11.65, Z79.4    Pressure ulcer of coccygeal region, stage 4 ([facility]) L89.154    Subcutaneous nodule of right foot R22.41       Assessment of today's active condition(s): RA, decreased mobility, Hx back surgery, development of small stage 4 sacral pressure ulcer, excised in the OR, no signs of infection, making decent progress with serial debridements, offloading, nutritional attention, NPWT. Factors contributing to occurrence and/or persistence of the chronic ulcer include diabetes, chronic pressure, decreased mobility, shear force and immunosuppression.  Medical necessity of today's visit is shown by the above documentation. Sharp debridement is indicated today, based upon the exam findings in the wound(s) above. Procedure note:     Consent obtained. Time out performed per [address] Sandhu policy. Anesthetic  Anesthetic: 4% Lidocaine Cream     Using a curette, I sharply debrided the sacrum ulcer(s) down through and including the removal of subcutaneous tissue. The type(s) of tissue debrided included fibrin, biofilm and necrotic/eschar. Total Surface Area Debrided: 8 sq cm. The ulcers were then irrigated with normal saline solution. The procedure was completed with a small amount of bleeding, and hemostasis was with pressure. The patient tolerated the procedure well, with no significant complications. The patient's level of pain during and after the procedure was monitored. Post-debridement measurements, if different from pre-debridement, are in the flowsheet as well. Discharge plan:     Treatment in the wound care center today, per RN documentation: Wound 12/18/20 #2, Sacrum, Pressure Injury, Stage 4, Onset 5/2020-Dressing/Treatment: (flagyl, NPWT). Keep up with protein intake, glucose control, diabetic diet, offloading (side-lying in bed; ROHO cushion when seated). Await longer-term plans for RA and presumed [employer] mgmt; just on her 4mg daily prednisone right now, and the Enbrel. Be sure to call Dr. Vasu Carey re: that foot nodule. Home treatment: good handwashing before and after any dressing changes. Cleanse wound with saline or soap & water before dressing change. May use Vaseline (petrolatum), Aquaphor, Aveeno, CeraVe, Cetaphil, Eucerin, Lubriderm, etc for dry skin. Dressing type for home: Hypochlorous acid spray, Flagyl powder PRN, and NPWT (black foam, standard drape, bridge toward hip, 150 mmHg continuous negative pressure), three times weekly. Written discharge instructions given to patient. Follow up in 1 week.     Electronically signed by Governor Betty MD on 3/18/2021 at 11:28 AM.

## 2021-03-24 ENCOUNTER — HOSPITAL ENCOUNTER (OUTPATIENT)
Dept: WOUND CARE | Age: 71
Discharge: HOME | End: 2021-03-24
Payer: MEDICARE

## 2021-03-24 VITALS
SYSTOLIC BLOOD PRESSURE: 141 MMHG | WEIGHT: 168 LBS | HEIGHT: 70 IN | DIASTOLIC BLOOD PRESSURE: 74 MMHG | RESPIRATION RATE: 18 BRPM | HEART RATE: 81 BPM | TEMPERATURE: 97.9 F | BODY MASS INDEX: 24.05 KG/M2

## 2021-03-24 DIAGNOSIS — L89.154 PRESSURE ULCER OF COCCYGEAL REGION, STAGE 4 (HCC): ICD-10-CM

## 2021-03-24 PROCEDURE — 97597 DBRDMT OPN WND 1ST 20 CM/<: CPT

## 2021-03-24 PROCEDURE — 97605 NEG PRS WND THER DME<=50SQCM: CPT

## 2021-03-24 PROCEDURE — 97597 DBRDMT OPN WND 1ST 20 CM/<: CPT | Performed by: INTERNAL MEDICINE

## 2021-03-24 RX ORDER — LIDOCAINE 40 MG/G
CREAM TOPICAL ONCE
Status: DISCONTINUED | OUTPATIENT
Start: 2021-03-24 | End: 2021-03-25 | Stop reason: HOSPADM

## 2021-03-24 ASSESSMENT — PAIN SCALES - GENERAL: PAINLEVEL_OUTOF10: 0

## 2021-03-24 NOTE — PLAN OF CARE
Patient to [address] [place] [address],[address] for wound care. Patient debrided per MD and tolerated well. Patient to continue NPWT and orders faxed to [facility] Follow up care in one week.

## 2021-03-27 NOTE — PROGRESS NOTES
88 [facility] Progress Note    Tami Ayala     : 1950    DATE OF VISIT:  3/24/2021    Subjective:     Tami yAala is a 72 y.o. female who has a pressure ulcer located on the sacrum. Significant symptoms or pertinent wound history since last visit: feeling ok, some stable mild pain, no fever, moderate drainage, doing ok with VAC (occasional trouble with seal). Additional ulcer(s) noted? no. Sees Dr. Justus sibley for her foot nodule. Her current medication list consists of Acapella, DULoxetine, Insulin Syringe-Needle U-100, Roflumilast, Teriparatide (Recombinant), albuterol sulfate HFA, aspirin EC, atorvastatin, azithromycin, blood glucose test strips, calcium carbonate, cetirizine, cyclobenzaprine, docusate sodium, etanercept, fluticasone, gabapentin, insulin glargine, insulin lispro, ipratropium, latanoprost, losartan, meloxicam, metoprolol succinate, metroNIDAZOLE, morphine, naloxone, omeprazole, polyethylene glycol, torsemide, and vitamin D. Allergies: Atenolol    Objective:     Vitals:    21 0921   BP: (!) 141/74   Pulse: 81   Resp: 18   Temp: 97.9 °F (36.6 °C)   TempSrc: Oral   Weight: 168 lb (76.2 kg)   Height: 5' 10\" (1.778 m)     AAOx3, fatigued, NAD  No cellulitis, angitis, fluctuance, no regional acute arthritis or bursitis  No contact dermatitis or cutaneous Candidiasis  Blanca-ulcer skin: indurated, pink, warm and dry. Ulcer(s): nearly all granular, a couple of small areas of fibrosis, some fibrin and biofilm, still a decent amount of undermining, but I think the wound volume continues to improve slowly. Photos also saved in electronic chart.     Today's wound measurements, per RN documentation:  Wound 20 #2, Sacrum, Pressure Injury, Stage 4, Onset 2020-Wound Length (cm): 3 cm    Wound 20 #2, Sacrum, Pressure Injury, Stage 4, Onset 2020-Wound Width (cm): 2.5 cm    Wound 20 #2, Sacrum, Pressure Injury, Stage 4, Onset 2020-Wound Depth (cm): 2.5 cm    Assessment:     Patient Active Problem List   Diagnosis Code    Rheumatoid arthritis ([facility] M06.9    Psoriasis L40.9    GERD (gastroesophageal reflux disease) K21.9    Chronic normocytic anemia D64.9    Cylindrical bronchiectasis ([facility]) J47.9    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    Immunocompromised state ([facility] D84.9    CAD in native artery I25.10    Stasis edema of both lower extremities I87.303    Essential hypertension, benign I10    Lumbar spondylosis M47.816    Mitral valve insufficiency and aortic valve insufficiency I08.0    Mixed hyperlipidemia E78.2    Myopia of both eyes H52.13    Osteoporosis M81.0    Other chronic sinusitis J32.8    Primary open angle glaucoma (POAG) of both eyes, mild stage H40.1131    Primary osteoarthritis of right hip M16.11    Type 2 diabetes mellitus with diabetic polyneuropathy, with long-term current use of insulin ([facility]) E11.42, Z79.4    Type 2 diabetes mellitus with unspecified diabetic retinopathy without macular edema ([facility]) E11.319    Uncontrolled type 2 diabetes mellitus with diabetic peripheral angiopathy without gangrene, with long-term current use of insulin ([facility]) E11.51, E11.65, Z79.4    Pressure ulcer of coccygeal region, stage 4 ([facility]) L89.154    Subcutaneous nodule of right foot R22.41       Assessment of today's active condition(s): stage 4 sacral pressure ulcer, no signs of infection, less necrosis, making slow progress toward healing with offloading, debridement, NPWT; could potentially consider a muscle flap closure in other circumstances, but with her RA and immune suppression, I think her risk of nonhealing surgical wound might be too high. Factors contributing to occurrence and/or persistence of the chronic ulcer include diabetes, chronic pressure, decreased mobility, shear force and immunosuppression. Medical necessity of today's visit is shown by the above documentation.  Sharp debridement is indicated today, based upon the

## 2021-03-29 ENCOUNTER — TELEPHONE (OUTPATIENT)
Dept: PULMONOLOGY | Age: 71
End: 2021-03-29

## 2021-03-29 DIAGNOSIS — G47.33 OSA (OBSTRUCTIVE SLEEP APNEA): Primary | ICD-10-CM

## 2021-03-29 NOTE — TELEPHONE ENCOUNTER
Patient insurance called states patient has been unable to get cpap supplies from [facility]. She is requesting orders to be faxed to [facility]. Orders pended. Make sure to fax OV and testing. 11/19/20    Assessment:   · COPD/Chronic bronchitis/cough with cylindrical bronchiectasis  · Mild DANIELLE/REM related sleep disordered breathing  · Tracheomalacia    · Lower extremity edema  · Pulmonary Nodules have resolved  · Rheumatoid arthritis on Embrel and MTX and 4 mg prednisone  · Positive tobacco history, 20 pack year quit in 1991  · Diabetes                Plan:   · Continue azithromycin daily  · Continue metolazone 5 mg only on days with weight greater than 200#  · Continue Daliresp; off Singulair  -- Failed Singulair, Failed Dulera, Failed Spiriva, Failed Advair.     · I recommended resuming CPAP   · On forteo for plan of 2 years  · See me in 6 months

## 2021-03-31 ENCOUNTER — HOSPITAL ENCOUNTER (OUTPATIENT)
Dept: WOUND CARE | Age: 71
Discharge: HOME | End: 2021-03-31
Payer: MEDICARE

## 2021-03-31 VITALS
RESPIRATION RATE: 18 BRPM | TEMPERATURE: 98.6 F | HEIGHT: 70 IN | DIASTOLIC BLOOD PRESSURE: 75 MMHG | HEART RATE: 82 BPM | BODY MASS INDEX: 23.37 KG/M2 | WEIGHT: 163.2 LBS | SYSTOLIC BLOOD PRESSURE: 147 MMHG

## 2021-03-31 DIAGNOSIS — L89.154 PRESSURE ULCER OF COCCYGEAL REGION, STAGE 4 (HCC): ICD-10-CM

## 2021-03-31 PROCEDURE — 97605 NEG PRS WND THER DME<=50SQCM: CPT

## 2021-03-31 PROCEDURE — 97597 DBRDMT OPN WND 1ST 20 CM/<: CPT

## 2021-03-31 PROCEDURE — 97597 DBRDMT OPN WND 1ST 20 CM/<: CPT | Performed by: INTERNAL MEDICINE

## 2021-03-31 RX ORDER — LIDOCAINE 40 MG/G
CREAM TOPICAL PRN
Status: DISCONTINUED | OUTPATIENT
Start: 2021-03-31 | End: 2021-04-01 | Stop reason: HOSPADM

## 2021-03-31 ASSESSMENT — PAIN SCALES - GENERAL: PAINLEVEL_OUTOF10: 0

## 2021-03-31 NOTE — PLAN OF CARE
Pt. States she is having trouble with the pump holding a seal & not alarming. Dr Nara Talavera will be in touch with the NPWT rep. To switch out the pump. Wound stable, debridement per MD & pt. Tolerated well. Will cont. With current wound care regime. Reinforced importance of cont. With good off-loading & protein intake to assist with wound healing, pt. Verbalizes understanding. F/u in [facility] in 1 week as ordered. Discharge instructions reviewed with patient, all questions answered, copy given to patient. Dressings were applied to all wounds per M.D. Instructions at this visit.

## 2021-04-05 PROBLEM — R22.41: Status: RESOLVED | Noted: 2021-03-10 | Resolved: 2021-04-05

## 2021-04-05 NOTE — PROGRESS NOTES
[facility] Progress Note    Christina Colon     : 1950    DATE OF VISIT:  3/31/2021    Subjective:     Christina Colon is a 72 y.o. female who has a pressure ulcer located on the sacrum. Significant symptoms or pertinent wound history since last visit: feeling ok, some mild stable wound pain, arthralgias up and down. No fever. NPWT pump has not been alarming, but it sounds like it's developing an occasional air leak and losing its seal, just runs continuously at times. She saw Dr. Vidal Burgess about that firm, tender area on her dorsal foot, XR confirms just focal worsening arthritis, and she's working on an accommodating pad in her shoe. Additional ulcer(s) noted? no.      Her current medication list consists of Acapella, DULoxetine, Insulin Syringe-Needle U-100, Roflumilast, Teriparatide (Recombinant), albuterol sulfate HFA, aspirin EC, atorvastatin, azithromycin, blood glucose test strips, calcium carbonate, cetirizine, cyclobenzaprine, docusate sodium, etanercept, fluticasone, gabapentin, insulin glargine, insulin lispro, ipratropium, latanoprost, losartan, meloxicam, metoprolol succinate, metroNIDAZOLE, morphine, naloxone, omeprazole, polyethylene glycol, torsemide, and vitamin D. Allergies: Atenolol    Objective:     Vitals:    21 0914   BP: (!) 147/75   Pulse: 82   Resp: 18   Temp: 98.6 °F (37 °C)   TempSrc: Oral   Weight: 163 lb 3.2 oz (74 kg)   Height: 5' 10\" (1.778 m)     AAOx3, fatigued, NAD  No cellulitis, angitis, fluctuance  No regional acute arthritis or bursitis  No contact dermatitis or cutaneous Candidiasis  Blanca-ulcer skin: indurated, pink, warm and dry. Ulcer(s): nearly all granular, a few very small spots slightly fibrotic, some fibrin and biofilm, no truly deep necrosis, no signs of infection, wound volume slowly filling in. Photos also saved in electronic chart.     Today's wound measurements, per RN documentation:  Wound 20 #2, Sacrum, Pressure Injury, Stage 4, Onset 5/2020-Wound Length (cm): 3 cm    Wound 12/18/20 #2, Sacrum, Pressure Injury, Stage 4, Onset 5/2020-Wound Width (cm): 2 cm    Wound 12/18/20 #2, Sacrum, Pressure Injury, Stage 4, Onset 5/2020-Wound Depth (cm): 2.5 cm    Assessment:     Patient Active Problem List   Diagnosis Code    Rheumatoid arthritis ([facility] [address] M06.9    Psoriasis L40.9    GERD (gastroesophageal reflux disease) K21.9    Chronic normocytic anemia D64.9    Cylindrical bronchiectasis (HCC) J47.9    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    Immunocompromised state ([facility] [address] D84.9    CAD in native artery I25.10    Stasis edema of both lower extremities I87.303    Essential hypertension, benign I10    Lumbar spondylosis M47.816    Mitral valve insufficiency and aortic valve insufficiency I08.0    Mixed hyperlipidemia E78.2    Myopia of both eyes H52.13    Osteoporosis M81.0    Other chronic sinusitis J32.8    Primary open angle glaucoma (POAG) of both eyes, mild stage H40.1131    Primary osteoarthritis of right hip M16.11    Type 2 diabetes mellitus with diabetic polyneuropathy, with long-term current use of insulin ([facility]) E11.42, Z79.4    Type 2 diabetes mellitus with unspecified diabetic retinopathy without macular edema ([facility]) E11.319    Uncontrolled type 2 diabetes mellitus with diabetic peripheral angiopathy without gangrene, with long-term current use of insulin ([facility]) E11.51, E11.65, Z79.4    Pressure ulcer of coccygeal region, stage 4 ([facility]) L89.154       Assessment of today's active condition(s): RA, Hx DTI over the sacrum, progressed to a stage 4 pressure ulcer, no signs of osteomyelitis, excised in the OR, making some slow but steady progress with weekly debridements, offloading and NPWT, though it sounds like the pump might be malfunctioning at times. Factors contributing to occurrence and/or persistence of the chronic ulcer include diabetes, chronic pressure, decreased mobility, shear force and immunosuppression. Medical necessity of today's visit is shown by the above documentation. Sharp debridement is indicated today, based upon the exam findings in the wound(s) above. Procedure note:     Consent obtained. Time out performed per [facility] policy. Anesthetic  Anesthetic: 4% Lidocaine Cream     Using a curette, I sharply debrided the sacrum ulcer(s) down through and including the removal of dermis. The type(s) of tissue debrided included fibrin, biofilm and exudate. Total Surface Area Debrided: 6 sq cm. The ulcers were then irrigated with normal saline solution. The procedure was completed with a small amount of bleeding, and hemostasis was with pressure. The patient tolerated the procedure well, with no significant complications. The patient's level of pain during and after the procedure was monitored. Post-debridement measurements, if different from pre-debridement, are in the flowsheet as well. Discharge plan:     Treatment in the wound care center today, per RN documentation: Wound 12/18/20 #2, Sacrum, Pressure Injury, Stage 4, Onset 5/2020-Dressing/Treatment: (Flagyl,NPWT). Continue efforts at offloading, glucose control, protein intake. Will call the distributor of her NPWT pump, see if someone can troubleshoot it with her, or replace it. Home treatment: good handwashing before and after any dressing changes. Cleanse wound with saline or soap & water before dressing change. May use Vaseline (petrolatum), Aquaphor, Aveeno, CeraVe, Cetaphil, Eucerin, Lubriderm, etc for dry skin. Dressing type for home: Hypochlorous acid spray, Flagyl powder and NPWT (black foam, bridge toward hip, standard drape, 150 mmHg continuous negative pressure), three times weekly. Written discharge instructions given to patient. Follow up in 1 week.     Electronically signed by Margie Mujica MD on 4/5/2021 at 3:46 PM.

## 2021-04-07 ENCOUNTER — HOSPITAL ENCOUNTER (OUTPATIENT)
Dept: WOUND CARE | Age: 71
Discharge: HOME | End: 2021-04-07
Payer: MEDICARE

## 2021-04-07 VITALS
RESPIRATION RATE: 16 BRPM | DIASTOLIC BLOOD PRESSURE: 74 MMHG | SYSTOLIC BLOOD PRESSURE: 160 MMHG | TEMPERATURE: 97.7 F | BODY MASS INDEX: 23.29 KG/M2 | HEIGHT: 70 IN | HEART RATE: 74 BPM | WEIGHT: 162.7 LBS

## 2021-04-07 DIAGNOSIS — L89.154 PRESSURE ULCER OF COCCYGEAL REGION, STAGE 4 (HCC): Primary | ICD-10-CM

## 2021-04-07 PROCEDURE — 11042 DBRDMT SUBQ TIS 1ST 20SQCM/<: CPT

## 2021-04-07 PROCEDURE — 11042 DBRDMT SUBQ TIS 1ST 20SQCM/<: CPT | Performed by: INTERNAL MEDICINE

## 2021-04-07 PROCEDURE — 97605 NEG PRS WND THER DME<=50SQCM: CPT

## 2021-04-07 RX ORDER — LIDOCAINE HYDROCHLORIDE 40 MG/ML
SOLUTION TOPICAL ONCE
Status: DISCONTINUED | OUTPATIENT
Start: 2021-04-07 | End: 2021-04-08 | Stop reason: HOSPADM

## 2021-04-07 RX ORDER — BACITRACIN ZINC AND POLYMYXIN B SULFATE 500; 1000 [USP'U]/G; [USP'U]/G
OINTMENT TOPICAL ONCE
Status: DISCONTINUED | OUTPATIENT
Start: 2021-04-07 | End: 2021-04-08 | Stop reason: HOSPADM

## 2021-04-07 RX ORDER — LIDOCAINE 40 MG/G
CREAM TOPICAL ONCE
Status: CANCELLED | OUTPATIENT
Start: 2021-04-07 | End: 2021-04-07

## 2021-04-07 RX ORDER — LIDOCAINE HYDROCHLORIDE 40 MG/ML
SOLUTION TOPICAL ONCE
Status: CANCELLED | OUTPATIENT
Start: 2021-04-07 | End: 2021-04-07

## 2021-04-07 RX ORDER — BACITRACIN ZINC AND POLYMYXIN B SULFATE 500; 1000 [USP'U]/G; [USP'U]/G
OINTMENT TOPICAL ONCE
Status: CANCELLED | OUTPATIENT
Start: 2021-04-07 | End: 2021-04-07

## 2021-04-07 RX ORDER — LIDOCAINE 50 MG/G
OINTMENT TOPICAL ONCE
Status: CANCELLED | OUTPATIENT
Start: 2021-04-07 | End: 2021-04-07

## 2021-04-07 RX ORDER — LIDOCAINE 40 MG/G
CREAM TOPICAL ONCE
Status: DISCONTINUED | OUTPATIENT
Start: 2021-04-07 | End: 2021-04-08 | Stop reason: HOSPADM

## 2021-04-07 RX ORDER — LIDOCAINE 50 MG/G
OINTMENT TOPICAL ONCE
Status: DISCONTINUED | OUTPATIENT
Start: 2021-04-07 | End: 2021-04-08 | Stop reason: HOSPADM

## 2021-04-07 ASSESSMENT — PAIN SCALES - GENERAL
PAINLEVEL_OUTOF10: 0
PAINLEVEL_OUTOF10: 0

## 2021-04-07 NOTE — PLAN OF CARE
[facility] Physician Orders and Discharge [address]  [address], [address]  [place], [facility]  Telephone: [phone]      Fax: [phone]        Your home care Tanya [address]     Your wound-care supplies will be provided by: We are ordering your wound-care supplies from [employer]. Please note, depending on your insurance coverage, you may have out-of-pocket expenses for these supplies. Someone from [employer] should call you to confirm your order and discuss those potential costs before they ship your products -- please anticipate that call. If your out-of-pocket cost could be substantial, [facility] has a financial hardship program for patients who qualify, so please ask about that if you might need a hand. If you have any questions about your supplies or your potential out-of-pocket costs, or if you need to place an order for a refill of supplies (typically monthly), please call [phone].      NAME:  Gris Taveras   DATE of BIRTH:  1950  PRIMARY DIAGNOSIS FOR WOUND CARE CENTER:  Pressure ulcer     Wound cleansing:   Do not scrub or use excessive force. Wash hands with soap and water before and after dressing changes. Prior to applying a clean dressing, cleanse wound with normal saline, wound cleanser, or mild soap and water.  Ask your physician or nurse before getting the wound(s) wet in the shower.                Wound care for home:     Sacral Wound:  Vashe or Hypochlorous acid soaked gauze applied to wound for 2-3 minutes, no need to rinse  Flagyl crushed & sprinkled on wound bed as needed for malodor  Stoma paste or Stoma barrier ring to distal area of wound to help obtain a better seal & prevent air leak  Drape to maegan-wound & under any foam for bridge  Black foam to wound & bridged to left hip (MAKE SURE NOT TO OVER FILL UNDERMINED AREA WITH BLACK FOAM)  Cover with drape  Pressure -150mmHg continuous  HHC to change dressing on Friday & Monday, Wound Care will change during visits on Wednesday        Please note, all wounds (unless stated otherwise here) were mechanically debrided at the time of cleansing here in the wound-care center today, so a small amount of pain, drainage or bleeding from that process might be expected, and is normal.      All products for home use, including multiple products for a single wound if applicable, are medically necessary in order to achieve the best chance at timely wound healing. See provider documentation for details if needed.     Substituted dressings applied in the [facility] today, if applicable:     N/a     New orders for this week (labs, imaging, medications, etc.):       Dr Leyla Garcia will be in touch re: pump not alarming & holding pressure. Leslie Mai should be in touch with you.      Home Care to order dressing supplies through [employer] when needed. May contact the local rep. For any questions or to order supplies  Chata Acosta, # [phone]       Additional instructions for specific diagnoses:     +ROHO cushion- use at [address] when sitting! !      General comments for pressure ulcers:  *  Make sure you stay well-hydrated, and maintain good protein intake. *  Reposition at least every two hours, to keep from having pressure in one spot for too long. *  If you are not sure whether you have the best offloading surfaces (mattress, mattress overlay, chair cushion, heel-protector boots, etc), please ask. *  Moisturize your skin regularly with Vaseline, Aquaphor, Aveeno, CeraVe, Cetaphil, Eucerin, Lubriderm, etc; but keep the skin between your toes dry.   *  If you smoke, your wound can not heal properly -- please talk with us when you're ready to quit.         F/U Appointment is with Dr. Leyla Garcia in 1 week on                                                at                       .     Your nurse  is ABRAHAM Zambrano      If we applied slip-resistant hospital socks today, be sure to remove them

## 2021-04-07 NOTE — PLAN OF CARE
Pt. States she had another episode this last week with her NPWT holding drainage in the tubing, leaking at the dressing & not alarming. Pt. Did also state after this dressing was changed, not further problems noted. Wound stable, debridement per MD & pt. Tolerated well. Will cont. With current wound care regime with NPWT as ordered. Reinforced importance of protein intake & off-loading to assist with wound healing, pt. Verbalizes understanding. F/u in [address] [place] [address],[address] in 1 week as ordered. Discharge instructions reviewed with patient, all questions answered, copy given to patient. Dressings were applied to all wounds per M.D. Instructions at this visit.

## 2021-04-13 NOTE — PROGRESS NOTES
88 [facility] Progress Note    José Miguel Alas     : 1950    DATE OF VISIT:  2021    Subjective:     José Miguel Alas is a 72 y.o. female who has a pressure ulcer located on the sacrum. Significant symptoms or pertinent wound history since last visit: feeling ok overall, mild wound pain, brief trouble with new NPWT pump over the weekend, but it's been fine since then. No fever. Additional ulcer(s) noted? no.      Her current medication list consists of Acapella, DULoxetine, Insulin Syringe-Needle U-100, Roflumilast, Teriparatide (Recombinant), albuterol sulfate HFA, aspirin EC, atorvastatin, azithromycin, blood glucose test strips, calcium carbonate, cetirizine, cyclobenzaprine, docusate sodium, etanercept, fluticasone, gabapentin, insulin glargine, insulin lispro, ipratropium, latanoprost, losartan, meloxicam, metoprolol succinate, metroNIDAZOLE, morphine, naloxone, omeprazole, polyethylene glycol, torsemide, and vitamin D. Allergies: Atenolol    Objective:     Vitals:    21 0916   BP: (!) 160/74   Pulse: 74   Resp: 16   Temp: 97.7 °F (36.5 °C)   TempSrc: Oral   Weight: 162 lb 11.2 oz (73.8 kg)   Height: 5' 10\" (1.778 m)     AAOx3, fatigued, NAD  No cellulitis, angitis, fluctuance  No regional acute arthritis or bursitis  No cutaneous Candidiasis, very mild dressing dermatitis  Blanca-ulcer skin: indurated, pink and warm. Ulcer(s): slowly smaller, mostly pink-red and granular, fibrin and a bit of biofilm, serous exudate, still one or two deeper foci of a bit of SQ fibronecrosis, no bone exposure. Photos also saved in electronic chart.     Today's wound measurements, per RN documentation:  Wound 20 #2, Sacrum, Pressure Injury, Stage 4, Onset 2020-Wound Length (cm): 2.7 cm    Wound 20 #2, Sacrum, Pressure Injury, Stage 4, Onset 2020-Wound Width (cm): 1.3 cm    Wound 20 #2, Sacrum, Pressure Injury, Stage 4, Onset 2020-Wound Depth (cm): 1.8 cm    Assessment:     Patient Active Problem List   Diagnosis Code    Rheumatoid arthritis ([facility] [address] M06.9    Psoriasis L40.9    GERD (gastroesophageal reflux disease) K21.9    Chronic normocytic anemia D64.9    Cylindrical bronchiectasis ([facility]) J47.9    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    Immunocompromised state ([facility] [address] D84.9    CAD in native artery I25.10    Stasis edema of both lower extremities I87.303    Essential hypertension, benign I10    Lumbar spondylosis M47.816    Mitral valve insufficiency and aortic valve insufficiency I08.0    Mixed hyperlipidemia E78.2    Myopia of both eyes H52.13    Osteoporosis M81.0    Other chronic sinusitis J32.8    Primary open angle glaucoma (POAG) of both eyes, mild stage H40.1131    Primary osteoarthritis of right hip M16.11    Type 2 diabetes mellitus with diabetic polyneuropathy, with long-term current use of insulin ([facility]) E11.42, Z79.4    Type 2 diabetes mellitus with unspecified diabetic retinopathy without macular edema ([facility]) E11.319    Uncontrolled type 2 diabetes mellitus with diabetic peripheral angiopathy without gangrene, with long-term current use of insulin ([facility]) E11.51, E11.65, Z79.4    Pressure ulcer of coccygeal region, stage 4 ([facility]) L89.154       Assessment of today's active condition(s): RA, Hx back surgery, stage 4 sacral pressure ulcer, no signs of osteo, making slow but steady progress with NPWT, no signs of infection. Factors contributing to occurrence and/or persistence of the chronic ulcer include diabetes, chronic pressure, decreased mobility, shear force and immunosuppression. Medical necessity of today's visit is shown by the above documentation. Sharp debridement is indicated today, based upon the exam findings in the wound(s) above. Procedure note:     Consent obtained. Time out performed per [facility] policy.     Anesthetic  Anesthetic: 4% Lidocaine Cream     Using a curette, #15 blade scalpel and forceps, I sharply debrided the sacrum

## 2021-04-14 ENCOUNTER — HOSPITAL ENCOUNTER (OUTPATIENT)
Dept: WOUND CARE | Age: 71
Discharge: HOME | End: 2021-04-14
Payer: MEDICARE

## 2021-04-14 VITALS
HEART RATE: 79 BPM | SYSTOLIC BLOOD PRESSURE: 153 MMHG | TEMPERATURE: 99.4 F | BODY MASS INDEX: 23.59 KG/M2 | WEIGHT: 164.8 LBS | DIASTOLIC BLOOD PRESSURE: 70 MMHG | RESPIRATION RATE: 18 BRPM | HEIGHT: 70 IN

## 2021-04-14 DIAGNOSIS — L89.154 PRESSURE ULCER OF COCCYGEAL REGION, STAGE 4 (HCC): Primary | ICD-10-CM

## 2021-04-14 PROCEDURE — 11042 DBRDMT SUBQ TIS 1ST 20SQCM/<: CPT | Performed by: INTERNAL MEDICINE

## 2021-04-14 PROCEDURE — 97605 NEG PRS WND THER DME<=50SQCM: CPT

## 2021-04-14 PROCEDURE — 11042 DBRDMT SUBQ TIS 1ST 20SQCM/<: CPT

## 2021-04-14 RX ORDER — LIDOCAINE 50 MG/G
OINTMENT TOPICAL ONCE
Status: DISCONTINUED | OUTPATIENT
Start: 2021-04-14 | End: 2021-04-15 | Stop reason: HOSPADM

## 2021-04-14 RX ORDER — BACITRACIN ZINC AND POLYMYXIN B SULFATE 500; 1000 [USP'U]/G; [USP'U]/G
OINTMENT TOPICAL ONCE
Status: DISCONTINUED | OUTPATIENT
Start: 2021-04-14 | End: 2021-04-15 | Stop reason: HOSPADM

## 2021-04-14 RX ORDER — BACITRACIN ZINC AND POLYMYXIN B SULFATE 500; 1000 [USP'U]/G; [USP'U]/G
OINTMENT TOPICAL ONCE
Status: CANCELLED | OUTPATIENT
Start: 2021-04-14 | End: 2021-04-14

## 2021-04-14 RX ORDER — LIDOCAINE HYDROCHLORIDE 40 MG/ML
SOLUTION TOPICAL ONCE
Status: DISCONTINUED | OUTPATIENT
Start: 2021-04-14 | End: 2021-04-15 | Stop reason: HOSPADM

## 2021-04-14 RX ORDER — LIDOCAINE 40 MG/G
CREAM TOPICAL ONCE
Status: CANCELLED | OUTPATIENT
Start: 2021-04-14 | End: 2021-04-14

## 2021-04-14 RX ORDER — LIDOCAINE HYDROCHLORIDE 40 MG/ML
SOLUTION TOPICAL ONCE
Status: CANCELLED | OUTPATIENT
Start: 2021-04-14 | End: 2021-04-14

## 2021-04-14 RX ORDER — LIDOCAINE 50 MG/G
OINTMENT TOPICAL ONCE
Status: CANCELLED | OUTPATIENT
Start: 2021-04-14 | End: 2021-04-14

## 2021-04-14 RX ORDER — LIDOCAINE 40 MG/G
CREAM TOPICAL ONCE
Status: DISCONTINUED | OUTPATIENT
Start: 2021-04-14 | End: 2021-04-15 | Stop reason: HOSPADM

## 2021-04-14 ASSESSMENT — PAIN SCALES - GENERAL: PAINLEVEL_OUTOF10: 0

## 2021-04-14 NOTE — PLAN OF CARE
[facility] Physician Orders and Discharge [address] [facility]  [address], [address]  [place], [facility]  Telephone: [phone]      Fax: [phone]        Your home care Tanya [address]     Your wound-care supplies will be provided by: We are ordering your wound-care supplies from [employer]. Please note, depending on your insurance coverage, you may have out-of-pocket expenses for these supplies. Someone from [employer] should call you to confirm your order and discuss those potential costs before they ship your products -- please anticipate that call. If your out-of-pocket cost could be substantial, [facility] has a financial hardship program for patients who qualify, so please ask about that if you might need a hand. If you have any questions about your supplies or your potential out-of-pocket costs, or if you need to place an order for a refill of supplies (typically monthly), please call [phone].      NAME:  Gris Taveras   DATE of BIRTH:  1950  PRIMARY DIAGNOSIS FOR WOUND CARE CENTER:  Pressure ulcer     Wound cleansing:   Do not scrub or use excessive force. Wash hands with soap and water before and after dressing changes. Prior to applying a clean dressing, cleanse wound with normal saline, wound cleanser, or mild soap and water.  Ask your physician or nurse before getting the wound(s) wet in the shower.                Wound care for home:     Sacral Wound:  Vashe or Hypochlorous acid soaked gauze applied to wound for 2-3 minutes, no need to rinse  Flagyl crushed & sprinkled on wound bed as needed for malodor  Stoma paste or Stoma barrier ring to distal area of wound to help obtain a better seal & prevent air leak  Drape to maegan-wound & under any foam for bridge  ADD USING COLLAGEN TO BASE OF WOUND   Black foam to wound & bridged to left hip (MAKE SURE NOT TO OVER FILL UNDERMINED AREA WITH BLACK FOAM)  Cover with drape  Pressure -150mmHg continuous  HHC to change dressing on Friday & Monday, Wound Care will change during visits on Wednesday        Please note, all wounds (unless stated otherwise here) were mechanically debrided at the time of cleansing here in the wound-care center today, so a small amount of pain, drainage or bleeding from that process might be expected, and is normal.      All products for home use, including multiple products for a single wound if applicable, are medically necessary in order to achieve the best chance at timely wound healing. See provider documentation for details if needed.     Substituted dressings applied in the [facility] today, if applicable:     N/a     New orders for this week (labs, imaging, medications, etc.):        WILL START USING COLLAGEN TO BASE OF WOUND THEN NPWT FOAM OVER THE COLLAGEN  Pt. To check is see if she still has collagen left from previous dressing supplies ordered. Home Care to order dressing supplies through [address] [place] [address] when needed. May contact the local rep. For any questions or to order supplies  Cynthia Medrano, # [phone]       Additional instructions for specific diagnoses:     +ROHO cushion- use at [address] when sitting! !      General comments for pressure ulcers:  *  Make sure you stay well-hydrated, and maintain good protein intake. *  Reposition at least every two hours, to keep from having pressure in one spot for too long. *  If you are not sure whether you have the best offloading surfaces (mattress, mattress overlay, chair cushion, heel-protector boots, etc), please ask. *  Moisturize your skin regularly with Vaseline, Aquaphor, Aveeno, CeraVe, Cetaphil, Eucerin, Lubriderm, etc; but keep the skin between your toes dry.   *  If you smoke, your wound can not heal properly -- please talk with us when you're ready to quit.         F/U Appointment is with Dr. Juanjose Stephens in 1 week on [facility]

## 2021-04-19 NOTE — PROGRESS NOTES
88 [facility] Progress Note    Speedy Chao     : 1950    DATE OF VISIT:  2021    Subjective:     Speedy Chao is a 72 y.o. female who has a pressure ulcer located on the sacrum. Significant symptoms or pertinent wound history since last visit: feeling ok overall, NPWT working well, generally mild pain, no fever, eating well, doing well with ROHO cushion. Additional ulcer(s) noted? no.      Her current medication list consists of Acapella, DULoxetine, Insulin Syringe-Needle U-100, Roflumilast, Teriparatide (Recombinant), albuterol sulfate HFA, aspirin EC, atorvastatin, azithromycin, blood glucose test strips, calcium carbonate, cetirizine, cyclobenzaprine, docusate sodium, etanercept, fluticasone, gabapentin, insulin glargine, insulin lispro, ipratropium, latanoprost, losartan, meloxicam, metoprolol succinate, metroNIDAZOLE, morphine, naloxone, omeprazole, polyethylene glycol, torsemide, and vitamin D. Allergies: Atenolol    Objective:     Vitals:    21 0920   BP: (!) 153/70   Pulse: 79   Resp: 18   Temp: 99.4 °F (37.4 °C)  Comment: she just drank hot tea   TempSrc: Oral   Weight: 164 lb 12.8 oz (74.8 kg)   Height: 5' 10\" (1.778 m)     AAOx3, fatigued, NAD  No cellulitis, angitis, fluctuance  No regional acute arthritis or bursitis  No cutaneous Candidiasis, mild contact dermatitis from VAC drape  Blanca-ulcer skin: indurated, pink, warm, dry and contact dermatitis. Ulcer(s): surface measurements definitely getting smaller, still some depth and undermining, most of wound bed pink-red and granular, fibrin and biofilm, small pockets of fibronecrotic SQ tissue, no bone exposure. Photos also saved in electronic chart.     Today's wound measurements, per RN documentation:  Wound 20 #2, Sacrum, Pressure Injury, Stage 4, Onset 2020-Wound Length (cm): 3.2 cm    Wound 20 #2, Sacrum, Pressure Injury, Stage 4, Onset 2020-Wound Width (cm): 1 cm    Wound 12/18/20 #2, Sacrum, Pressure Injury, Stage 4, Onset 5/2020-Wound Depth (cm): 2 cm    Assessment:     Patient Active Problem List   Diagnosis Code    Rheumatoid arthritis ([facility] [address] M06.9    Psoriasis L40.9    GERD (gastroesophageal reflux disease) K21.9    Chronic normocytic anemia D64.9    Cylindrical bronchiectasis ([facility]) J47.9    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    Immunocompromised state ([facility] [address] D84.9    CAD in native artery I25.10    Stasis edema of both lower extremities I87.303    Essential hypertension, benign I10    Lumbar spondylosis M47.816    Mitral valve insufficiency and aortic valve insufficiency I08.0    Mixed hyperlipidemia E78.2    Myopia of both eyes H52.13    Osteoporosis M81.0    Other chronic sinusitis J32.8    Primary open angle glaucoma (POAG) of both eyes, mild stage H40.1131    Primary osteoarthritis of right hip M16.11    Type 2 diabetes mellitus with diabetic polyneuropathy, with long-term current use of insulin ([facility]) E11.42, Z79.4    Type 2 diabetes mellitus with unspecified diabetic retinopathy without macular edema ([facility]) E11.319    Uncontrolled type 2 diabetes mellitus with diabetic peripheral angiopathy without gangrene, with long-term current use of insulin ([facility]) E11.51, E11.65, Z79.4    Pressure ulcer of coccygeal region, stage 4 ([facility]) L89.154       Assessment of today's active condition(s): RA, stage 4 sacral pressure ulcer, slowly healing with debridement, offloading, NPWT, no signs of infection. Factors contributing to occurrence and/or persistence of the chronic ulcer include chronic pressure, decreased mobility, shear force and immunosuppression. Medical necessity of today's visit is shown by the above documentation. Sharp debridement is indicated today, based upon the exam findings in the wound(s) above. Procedure note:     Consent obtained. Time out performed per [facility] policy.     Anesthetic  Anesthetic: 4% Lidocaine Cream     Using a curette, #15 blade scalpel

## 2021-04-21 ENCOUNTER — HOSPITAL ENCOUNTER (OUTPATIENT)
Dept: WOUND CARE | Age: 71
Discharge: HOME | End: 2021-04-21
Payer: MEDICARE

## 2021-04-21 VITALS
HEART RATE: 81 BPM | SYSTOLIC BLOOD PRESSURE: 127 MMHG | TEMPERATURE: 98.1 F | RESPIRATION RATE: 18 BRPM | BODY MASS INDEX: 23.48 KG/M2 | WEIGHT: 164 LBS | HEIGHT: 70 IN | DIASTOLIC BLOOD PRESSURE: 64 MMHG

## 2021-04-21 DIAGNOSIS — L89.154 PRESSURE ULCER OF COCCYGEAL REGION, STAGE 4 (HCC): Primary | ICD-10-CM

## 2021-04-21 PROCEDURE — 97597 DBRDMT OPN WND 1ST 20 CM/<: CPT | Performed by: INTERNAL MEDICINE

## 2021-04-21 PROCEDURE — 97605 NEG PRS WND THER DME<=50SQCM: CPT

## 2021-04-21 PROCEDURE — 97597 DBRDMT OPN WND 1ST 20 CM/<: CPT

## 2021-04-21 RX ORDER — LIDOCAINE HYDROCHLORIDE 40 MG/ML
SOLUTION TOPICAL ONCE
Status: DISCONTINUED | OUTPATIENT
Start: 2021-04-21 | End: 2021-04-22 | Stop reason: HOSPADM

## 2021-04-21 RX ORDER — BACITRACIN ZINC AND POLYMYXIN B SULFATE 500; 1000 [USP'U]/G; [USP'U]/G
OINTMENT TOPICAL ONCE
Status: DISCONTINUED | OUTPATIENT
Start: 2021-04-21 | End: 2021-04-22 | Stop reason: HOSPADM

## 2021-04-21 RX ORDER — BACITRACIN ZINC AND POLYMYXIN B SULFATE 500; 1000 [USP'U]/G; [USP'U]/G
OINTMENT TOPICAL ONCE
Status: CANCELLED | OUTPATIENT
Start: 2021-04-21 | End: 2021-04-21

## 2021-04-21 RX ORDER — LIDOCAINE 40 MG/G
CREAM TOPICAL ONCE
Status: DISCONTINUED | OUTPATIENT
Start: 2021-04-21 | End: 2021-04-22 | Stop reason: HOSPADM

## 2021-04-21 RX ORDER — LIDOCAINE 40 MG/G
CREAM TOPICAL ONCE
Status: CANCELLED | OUTPATIENT
Start: 2021-04-21 | End: 2021-04-21

## 2021-04-21 RX ORDER — LIDOCAINE HYDROCHLORIDE 40 MG/ML
SOLUTION TOPICAL ONCE
Status: CANCELLED | OUTPATIENT
Start: 2021-04-21 | End: 2021-04-21

## 2021-04-21 RX ORDER — LIDOCAINE 50 MG/G
OINTMENT TOPICAL ONCE
Status: DISCONTINUED | OUTPATIENT
Start: 2021-04-21 | End: 2021-04-22 | Stop reason: HOSPADM

## 2021-04-21 RX ORDER — LIDOCAINE 50 MG/G
OINTMENT TOPICAL ONCE
Status: CANCELLED | OUTPATIENT
Start: 2021-04-21 | End: 2021-04-21

## 2021-04-21 ASSESSMENT — PAIN SCALES - GENERAL: PAINLEVEL_OUTOF10: 0

## 2021-04-21 NOTE — PLAN OF CARE
[address] [facility] Physician Orders and Discharge [address]  [address], [address]  [place], [facility]  Telephone: [phone]      Fax: [phone] home care company:   [facility]     Your wound-care supplies will be provided by: We are ordering your wound-care supplies from [employer]. Please note, depending on your insurance coverage, you may have out-of-pocket expenses for these supplies. Someone from [employer] should call you to confirm your order and discuss those potential costs before they ship your products -- please anticipate that call. If your out-of-pocket cost could be substantial, [facility] has a financial hardship program for patients who qualify, so please ask about that if you might need a hand. If you have any questions about your supplies or your potential out-of-pocket costs, or if you need to place an order for a refill of supplies (typically monthly), please call [phone]. NAME:  Laure Taveras   YOB: 1950  PRIMARY DIAGNOSIS FOR WOUND CARE CENTER:  Pressure ulcer     Wound cleansing:   Do not scrub or use excessive force. Wash hands with soap and water before and after dressing changes. Prior to applying a clean dressing, cleanse wound with normal saline, wound cleanser, or mild soap and water. Ask your physician or nurse before getting the wound(s) wet in the shower.                 Wound care for home:     Sacral Wound:  Vashe or Hypochlorous acid soaked gauze applied to wound for 2-3 minutes, no need to rinse  Flagyl crushed & sprinkled on wound bed as needed for malodor  Stoma paste or Stoma barrier ring to distal area of wound to help obtain a better seal & prevent air leak  Drape to maegan-wound & under any foam for bridge  ADD USING COLLAGEN TO BASE OF WOUND   Black foam to wound & bridged to left hip (MAKE SURE NOT TO OVER FILL UNDERMINED AREA WITH BLACK FOAM)  Cover with once a day to inspect your toes or feet, even if you're not changing the wraps or dressings underneath. If you see anything concerning (redness, excess moisture, etc), please call and let us know right away.      Should you experience any significant changes in your wound(s) (including redness, increased warmth, increased pain, increased drainage, odor, or fever) or have questions about your wound care, please contact the [address] [place] [address] at [phone] Monday-Thursday from 8:00 am - 4:30 pm, or Friday from 8:00 am - 2:30 pm.  If you need help with your wound outside these hours and cannot wait until we are again available, contact your home-care company (if applicable), your PCP, or go to the nearest emergency room

## 2021-04-21 NOTE — PLAN OF CARE
Wound stable, debridement per MD & pt. Tolerated well. Will cont. With current wound care regime. Wound care will order collagen through [employer]. Reinforced importance of nutrition with protein intake & off-loading to assist with wound healing, pt. Verbalizes understanding. F/u in [facility] in 1 week as ordered. Discharge instructions reviewed with patient, all questions answered, copy given to patient. Dressings were applied to all wounds per M.D. Instructions at this visit.

## 2021-04-21 NOTE — PLAN OF CARE
[address] [facility],[address] Floor:      [address] f: [phone] f: [phone] p: [phone] [email or web address]     Ordering Center:     [address]  [address] [facility]  [phone]  Dept: [phone]   Fax# [phone]    Patient Information:      Kiera Nelson  [address]   [phone]   : 1950  AGE: 72 y.o. GENDER: female   TODAYS DATE:  2021    Insurance:      PRIMARY INSURANCE:  Plan: Mick Sos ESSENTIAL/PLUS  Coverage: BCBS MEDICARE  Effective Date: 2015  Group Number: [unfilled]  Subscriber Number: [id number] - (Medicare Managed)    Payor/Plan Subscr  Sex Relation Sub. Ins. ID Effective Group Num   1.  [id number] Jack Hall A 1950 Female Self [id number] 1/1/15 [facility]                                   [address]         Patient Wound Information:     Additional ICD-10 Codes: L89.154    Patient Active Problem List   Diagnosis Code    Rheumatoid arthritis ([facility] M06.9    Psoriasis L40.9    GERD (gastroesophageal reflux disease) K21.9    Chronic normocytic anemia D64.9    Cylindrical bronchiectasis (HCC) J47.9    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    Immunocompromised state ([facility] D84.9    CAD in native artery I25.10    Stasis edema of both lower extremities I87.303    Essential hypertension, benign I10    Lumbar spondylosis M47.816    Mitral valve insufficiency and aortic valve insufficiency I08.0    Mixed hyperlipidemia E78.2    Myopia of both eyes H52.13    Osteoporosis M81.0    Other chronic sinusitis J32.8    Primary open angle glaucoma (POAG) of both eyes, mild stage H40.1131    Primary osteoarthritis of right hip M16.11    Type 2 diabetes mellitus with diabetic polyneuropathy, with long-term current use of insulin (HCC) E11.42, Z79.4    Type 2 diabetes mellitus with unspecified diabetic retinopathy without macular edema [facility]) E11.319    Uncontrolled type 2 diabetes mellitus with diabetic peripheral angiopathy without gangrene, with long-term current use of insulin (HCC) E11.51, E11.65, Z79.4    Pressure ulcer of coccygeal region, stage 4 ([facility]) L89.154       WOUNDS REQUIRING DRESSING SUPPLIES:     Negative Pressure Wound Therapy Sacrum (Active)   Wound Type Pressure ulcer: Stage IV 04/21/21 1032   Unit Type Medella 04/21/21 1032   Dressing Type Black foam;Other (Comment) 04/21/21 1032   Number of pieces used 1 04/21/21 1032   Cycle Continuous 04/21/21 1032   Target Pressure (mmHg) 150 04/21/21 1032   Canister changed? No 04/21/21 1032   Dressing Status Clean;Dry; Intact 04/21/21 1032   Dressing Changed Changed/New 04/21/21 1032   Drainage Amount None 02/03/21 1022   Dressing Change Due 04/23/21 04/21/21 1032   Wound Assessment Red 01/27/21 1204   Blanca-wound Assessment Intact 01/27/21 1204   Odor None 01/27/21 1204   Number of days: 91       Wound 12/18/20 #2, Sacrum, Pressure Injury, Stage 4, Onset 5/2020 (Active)   Wound Image   03/10/21 0924   Wound Etiology Pressure Stage  4 04/21/21 0915   Dressing Status New dressing applied;Clean;Dry; Intact 04/21/21 1032   Wound Cleansed Vashe 04/21/21 1032   Dressing/Treatment Other (comment) 04/21/21 1032   Wound Length (cm) 3.2 cm 04/21/21 0915   Wound Width (cm) 1 cm 04/21/21 0915   Wound Depth (cm) 1.7 cm 04/21/21 0915   Wound Surface Area (cm^2) 3.2 cm^2 04/21/21 0915   Change in Wound Size % (l*w) -2033.33 04/21/21 0915   Wound Volume (cm^3) 5.44 cm^3 04/21/21 0915   Wound Healing % -5944 04/21/21 0915   Post-Procedure Length (cm) 3.2 cm 04/21/21 1001   Post-Procedure Width (cm) 1 cm 04/21/21 1001   Post-Procedure Depth (cm) 1.7 cm 04/21/21 1001   Post-Procedure Surface Area (cm^2) 3.2 cm^2 04/21/21 1001   Post-Procedure Volume (cm^3) 5.44 cm^3 04/21/21 1001   Distance Tunneling (cm) 0 cm 04/21/21 1001   Tunneling Position ___ O'Clock 0 04/21/21 0915   Undermining Starts ___ O'Clock 11 04/21/21 1001   Undermining Ends___ O'Clock 2 04/21/21 1001   Undermining Maxium Distance (cm) 3.1 04/21/21 1001   Wound Assessment Granulation tissue 04/14/21 0925   Drainage Amount Scant 04/21/21 0915   Drainage Description Serosanguinous 04/21/21 0915   Odor None 04/14/21 0925   Blanca-wound Assessment Intact 04/14/21 0925   Number of days: 124          Supplies Requested :      WOUND #: 2   PRIMARY DRESSING:    Collagen with silver   Cover and Secure with: None     FREQUENCY OF DRESSING CHANGES:  Three times per week    Wound Thickness [x] Full   []Partial       Patient Wound(s) Debrided: [x] Yes   [] No    Debridement Date: 4/21/2021    Debribement Type: Excisional/Sharp    ADDITIONAL ITEMS:  [] Gloves Small  [x] Gloves Medium [] Gloves Large [] Gloves Perez Mcdonald  [] Paper Tape 1\" [] Paper Tape 2\" [] Paper Tape 3\"  [] Medipore Tape 3\"  [] Saline  [] Skin Prep   [] Adhesive Remover   [] Cotton Tip Applicators  [] Tubular Stocking   [] Size E  [] Size G  [] Other:    Patient currently being seen by Home Health: [x] Yes   [] No    Duration for needed supplies:  []15  [x]30  []60  []90 Days    Provider Information:      PROVIDER'S NAME/NPI  Dr. Moy Moy  NPI [id number]  I give permission to coordinate the care for this patient

## 2021-04-23 ENCOUNTER — HOSPITAL ENCOUNTER (EMERGENCY)
Age: 71
Discharge: ANOTHER HOSPITAL | End: 2021-04-24
Attending: STUDENT IN AN ORGANIZED HEALTH CARE EDUCATION/TRAINING PROGRAM
Payer: MEDICARE

## 2021-04-23 ENCOUNTER — APPOINTMENT (OUTPATIENT)
Dept: CT IMAGING | Age: 71
End: 2021-04-23
Payer: MEDICARE

## 2021-04-23 ENCOUNTER — APPOINTMENT (OUTPATIENT)
Dept: GENERAL RADIOLOGY | Age: 71
End: 2021-04-23
Payer: MEDICARE

## 2021-04-23 VITALS
OXYGEN SATURATION: 98 % | WEIGHT: 164 LBS | HEIGHT: 70 IN | SYSTOLIC BLOOD PRESSURE: 91 MMHG | DIASTOLIC BLOOD PRESSURE: 48 MMHG | BODY MASS INDEX: 23.48 KG/M2 | TEMPERATURE: 99.9 F | HEART RATE: 64 BPM | RESPIRATION RATE: 22 BRPM

## 2021-04-23 DIAGNOSIS — I21.4 NSTEMI (NON-ST ELEVATED MYOCARDIAL INFARCTION) (HCC): Primary | ICD-10-CM

## 2021-04-23 DIAGNOSIS — R55 SYNCOPE AND COLLAPSE: ICD-10-CM

## 2021-04-23 DIAGNOSIS — J18.9 PNEUMONIA DUE TO ORGANISM: ICD-10-CM

## 2021-04-23 LAB
A/G RATIO: 0.9 (ref 1.1–2.2)
ALBUMIN SERPL-MCNC: 3.5 G/DL (ref 3.4–5)
ALP BLD-CCNC: 144 U/L (ref 40–129)
ALT SERPL-CCNC: 8 U/L (ref 10–40)
ANION GAP SERPL CALCULATED.3IONS-SCNC: 11 MMOL/L (ref 3–16)
APTT: 26.4 SEC (ref 24.2–36.2)
AST SERPL-CCNC: 28 U/L (ref 15–37)
BACTERIA: ABNORMAL /HPF
BASOPHILS ABSOLUTE: 0.1 K/UL (ref 0–0.2)
BASOPHILS RELATIVE PERCENT: 1 %
BILIRUB SERPL-MCNC: 0.6 MG/DL (ref 0–1)
BILIRUBIN URINE: NEGATIVE
BLOOD, URINE: ABNORMAL
BUN BLDV-MCNC: 16 MG/DL (ref 7–20)
CALCIUM SERPL-MCNC: 8.8 MG/DL (ref 8.3–10.6)
CHLORIDE BLD-SCNC: 89 MMOL/L (ref 99–110)
CLARITY: CLEAR
CO2: 31 MMOL/L (ref 21–32)
COLOR: YELLOW
CREAT SERPL-MCNC: 1 MG/DL (ref 0.6–1.2)
D DIMER: >5250 NG/ML DDU (ref 0–229)
EOSINOPHILS ABSOLUTE: 0.1 K/UL (ref 0–0.6)
EOSINOPHILS RELATIVE PERCENT: 1.5 %
EPITHELIAL CELLS, UA: ABNORMAL /HPF (ref 0–5)
GFR AFRICAN AMERICAN: >60
GFR NON-AFRICAN AMERICAN: 55
GLOBULIN: 4.1 G/DL
GLUCOSE BLD-MCNC: 233 MG/DL (ref 70–99)
GLUCOSE URINE: 100 MG/DL
HCT VFR BLD CALC: 33.8 % (ref 36–48)
HEMOGLOBIN: 11.1 G/DL (ref 12–16)
KETONES, URINE: NEGATIVE MG/DL
LEUKOCYTE ESTERASE, URINE: NEGATIVE
LYMPHOCYTES ABSOLUTE: 1.4 K/UL (ref 1–5.1)
LYMPHOCYTES RELATIVE PERCENT: 21.1 %
MCH RBC QN AUTO: 28.3 PG (ref 26–34)
MCHC RBC AUTO-ENTMCNC: 32.9 G/DL (ref 31–36)
MCV RBC AUTO: 86.1 FL (ref 80–100)
MICROSCOPIC EXAMINATION: YES
MONOCYTES ABSOLUTE: 0.4 K/UL (ref 0–1.3)
MONOCYTES RELATIVE PERCENT: 5.3 %
NEUTROPHILS ABSOLUTE: 4.7 K/UL (ref 1.7–7.7)
NEUTROPHILS RELATIVE PERCENT: 71.1 %
NITRITE, URINE: NEGATIVE
PDW BLD-RTO: 15.9 % (ref 12.4–15.4)
PH UA: 7 (ref 5–8)
PLATELET # BLD: 229 K/UL (ref 135–450)
PMV BLD AUTO: 8.1 FL (ref 5–10.5)
POTASSIUM REFLEX MAGNESIUM: 3.6 MMOL/L (ref 3.5–5.1)
PRO-BNP: 3348 PG/ML (ref 0–124)
PROCALCITONIN: 0.75 NG/ML (ref 0–0.15)
PROTEIN UA: NEGATIVE MG/DL
RBC # BLD: 3.92 M/UL (ref 4–5.2)
RBC UA: ABNORMAL /HPF (ref 0–4)
SARS-COV-2, NAAT: NOT DETECTED
SODIUM BLD-SCNC: 131 MMOL/L (ref 136–145)
SPECIFIC GRAVITY UA: 1.01 (ref 1–1.03)
TOTAL PROTEIN: 7.6 G/DL (ref 6.4–8.2)
TROPONIN: 0.59 NG/ML
URINE REFLEX TO CULTURE: ABNORMAL
URINE TYPE: ABNORMAL
UROBILINOGEN, URINE: 0.2 E.U./DL
WBC # BLD: 6.6 K/UL (ref 4–11)
WBC UA: ABNORMAL /HPF (ref 0–5)

## 2021-04-23 PROCEDURE — 96366 THER/PROPH/DIAG IV INF ADDON: CPT

## 2021-04-23 PROCEDURE — 87635 SARS-COV-2 COVID-19 AMP PRB: CPT

## 2021-04-23 PROCEDURE — 96367 TX/PROPH/DG ADDL SEQ IV INF: CPT

## 2021-04-23 PROCEDURE — 6370000000 HC RX 637 (ALT 250 FOR IP): Performed by: STUDENT IN AN ORGANIZED HEALTH CARE EDUCATION/TRAINING PROGRAM

## 2021-04-23 PROCEDURE — 71275 CT ANGIOGRAPHY CHEST: CPT

## 2021-04-23 PROCEDURE — 84484 ASSAY OF TROPONIN QUANT: CPT

## 2021-04-23 PROCEDURE — 96375 TX/PRO/DX INJ NEW DRUG ADDON: CPT

## 2021-04-23 PROCEDURE — 85379 FIBRIN DEGRADATION QUANT: CPT

## 2021-04-23 PROCEDURE — 85730 THROMBOPLASTIN TIME PARTIAL: CPT

## 2021-04-23 PROCEDURE — 84145 PROCALCITONIN (PCT): CPT

## 2021-04-23 PROCEDURE — 2580000003 HC RX 258: Performed by: STUDENT IN AN ORGANIZED HEALTH CARE EDUCATION/TRAINING PROGRAM

## 2021-04-23 PROCEDURE — 6360000002 HC RX W HCPCS: Performed by: STUDENT IN AN ORGANIZED HEALTH CARE EDUCATION/TRAINING PROGRAM

## 2021-04-23 PROCEDURE — 81001 URINALYSIS AUTO W/SCOPE: CPT

## 2021-04-23 PROCEDURE — 99285 EMERGENCY DEPT VISIT HI MDM: CPT

## 2021-04-23 PROCEDURE — 85025 COMPLETE CBC W/AUTO DIFF WBC: CPT

## 2021-04-23 PROCEDURE — 80053 COMPREHEN METABOLIC PANEL: CPT

## 2021-04-23 PROCEDURE — 6360000004 HC RX CONTRAST MEDICATION: Performed by: STUDENT IN AN ORGANIZED HEALTH CARE EDUCATION/TRAINING PROGRAM

## 2021-04-23 PROCEDURE — 96365 THER/PROPH/DIAG IV INF INIT: CPT

## 2021-04-23 PROCEDURE — 93005 ELECTROCARDIOGRAM TRACING: CPT | Performed by: STUDENT IN AN ORGANIZED HEALTH CARE EDUCATION/TRAINING PROGRAM

## 2021-04-23 PROCEDURE — 83880 ASSAY OF NATRIURETIC PEPTIDE: CPT

## 2021-04-23 PROCEDURE — 71046 X-RAY EXAM CHEST 2 VIEWS: CPT

## 2021-04-23 RX ORDER — 0.9 % SODIUM CHLORIDE 0.9 %
1000 INTRAVENOUS SOLUTION INTRAVENOUS ONCE
Status: COMPLETED | OUTPATIENT
Start: 2021-04-23 | End: 2021-04-23

## 2021-04-23 RX ORDER — ASPIRIN 81 MG/1
324 TABLET, CHEWABLE ORAL ONCE
Status: COMPLETED | OUTPATIENT
Start: 2021-04-23 | End: 2021-04-23

## 2021-04-23 RX ORDER — HEPARIN SODIUM 1000 [USP'U]/ML
4000 INJECTION, SOLUTION INTRAVENOUS; SUBCUTANEOUS PRN
Status: DISCONTINUED | OUTPATIENT
Start: 2021-04-24 | End: 2021-04-24 | Stop reason: HOSPADM

## 2021-04-23 RX ORDER — HEPARIN SODIUM 1000 [USP'U]/ML
4000 INJECTION, SOLUTION INTRAVENOUS; SUBCUTANEOUS ONCE
Status: COMPLETED | OUTPATIENT
Start: 2021-04-23 | End: 2021-04-23

## 2021-04-23 RX ORDER — HEPARIN SODIUM 1000 [USP'U]/ML
2000 INJECTION, SOLUTION INTRAVENOUS; SUBCUTANEOUS PRN
Status: DISCONTINUED | OUTPATIENT
Start: 2021-04-24 | End: 2021-04-24 | Stop reason: HOSPADM

## 2021-04-23 RX ORDER — HEPARIN SODIUM 10000 [USP'U]/100ML
850 INJECTION, SOLUTION INTRAVENOUS CONTINUOUS
Status: DISCONTINUED | OUTPATIENT
Start: 2021-04-23 | End: 2021-04-24 | Stop reason: HOSPADM

## 2021-04-23 RX ORDER — HEPARIN SODIUM 1000 [USP'U]/ML
2000 INJECTION, SOLUTION INTRAVENOUS; SUBCUTANEOUS PRN
Status: DISCONTINUED | OUTPATIENT
Start: 2021-04-24 | End: 2021-04-23

## 2021-04-23 RX ADMIN — ASPIRIN 324 MG: 81 TABLET, CHEWABLE ORAL at 19:48

## 2021-04-23 RX ADMIN — AZITHROMYCIN DIHYDRATE 500 MG: 500 INJECTION, POWDER, LYOPHILIZED, FOR SOLUTION INTRAVENOUS at 21:05

## 2021-04-23 RX ADMIN — HEPARIN SODIUM 850 UNITS/HR: 10000 INJECTION, SOLUTION INTRAVENOUS at 21:07

## 2021-04-23 RX ADMIN — CEFTRIAXONE SODIUM 1000 MG: 1 INJECTION, POWDER, FOR SOLUTION INTRAMUSCULAR; INTRAVENOUS at 19:48

## 2021-04-23 RX ADMIN — SODIUM CHLORIDE 1000 ML: 9 INJECTION, SOLUTION INTRAVENOUS at 19:04

## 2021-04-23 RX ADMIN — IOPAMIDOL 85 ML: 755 INJECTION, SOLUTION INTRAVENOUS at 20:26

## 2021-04-23 RX ADMIN — HEPARIN SODIUM 4000 UNITS: 1000 INJECTION INTRAVENOUS; SUBCUTANEOUS at 21:07

## 2021-04-23 ASSESSMENT — ENCOUNTER SYMPTOMS
NAUSEA: 0
ABDOMINAL PAIN: 0
STRIDOR: 0
COUGH: 1
RHINORRHEA: 0
SORE THROAT: 0
PHOTOPHOBIA: 0
SHORTNESS OF BREATH: 0
BACK PAIN: 0
VOMITING: 0

## 2021-04-23 ASSESSMENT — PAIN DESCRIPTION - DESCRIPTORS: DESCRIPTORS: ACHING

## 2021-04-23 ASSESSMENT — PAIN DESCRIPTION - LOCATION: LOCATION: BACK

## 2021-04-23 ASSESSMENT — PAIN SCALES - GENERAL: PAINLEVEL_OUTOF10: 8

## 2021-04-23 ASSESSMENT — PAIN DESCRIPTION - PAIN TYPE: TYPE: CHRONIC PAIN

## 2021-04-23 NOTE — CONSULTS
Low dose Heparin GTT:  APTT at baseline pending. If less than 36 seconds give a 4,000 unit IV bolus dose and begin the infusion at 8.5mL/hr. ABW of 71kg used for dose calculations. Check the aPTT 6hrs after starting protocol. Desired aPTT range is 49-76 seconds.   Lluvia Theodore PharmD 4/23/2021 7:53 PM

## 2021-04-23 NOTE — ED NOTES
1936- Call was placed to transfer center to sofia hospitalist at [facility]- Transfer center returned call with  on the line to speak with Frederic Gallardo  04/23/21 [address] [place] [address]  04/23/21 2000

## 2021-04-23 NOTE — ED PROVIDER NOTES
[address] ED  EMERGENCY DEPARTMENT ENCOUNTER      Pt Name: Sushant Pavon  MRN: [id number]  [place] 1950  Date of evaluation: 4/23/2021  Provider: Marilyn Álvarez DO    CHIEF COMPLAINT       Chief Complaint   Patient presents with    Dizziness     pt states has had problems with hypotension recently, c/o dizziness and weakness, lack of appetite         HISTORY OF PRESENT ILLNESS   (Location/Symptom, Timing/Onset, Context/Setting, Quality, Duration, Modifying Factors, Severity)  Note limiting factors. Sushant Pavon is a 72 y.o. female who presents to the emergency department complaining of lightheadedness dizziness hypotension. States that over the last 2 weeks has had issues with hypotension no new medications. Hypotension while seated as well as with standing. Is low as systolic of 02C-10B at times. Complains of generalized fatigue and weakness over the same time course. Complains of worsening productive cough. No fevers no chills. No chest pain no abdominal pain no nausea no vomiting. Does complain of loss of appetite. The patient states that 2 days ago she did have an episode of syncope. Nursing Notes were reviewed.     PAST MEDICAL HISTORY     Past Medical History:   Diagnosis Date    Atherosclerosis of native artery of right lower extremity with rest pain ([address] 07/25/2017    Back pain     Branch retinal vein occlusion 07/20/2012    Bronchiectasis with acute exacerbation (HCC)     Closed compression fracture of thoracic vertebra ([address] 01/15/2020    Closed fracture of facial bone with routine healing 11/21/2016    Closed jaw fracture ([address] 01/15/2020    Community acquired pneumonia of left lower lobe of lung     Compression fracture of L1 lumbar vertebra ([address] 01/15/2020    Fracture of tibial plateau, closed, left, initial encounter 12/05/2017    Minimally displaced zone I fracture of sacrum (HCC) 09/02/2020    Mucus plugging of bronchi     Osteomyelitis of TABLET    Take 1 tablet by mouth daily    ATORVASTATIN (LIPITOR) 40 MG TABLET    Take 40 mg by mouth    AZITHROMYCIN (ZITHROMAX) 250 MG TABLET    TAKE 1 TABLET BY MOUTH EVERY DAY    CALCIUM CARBONATE (OSCAL) 500 MG TABS TABLET    Take 500 mg by mouth daily    CETIRIZINE (ZYRTEC) 10 MG TABLET    Take 10 mg by mouth daily. CYCLOBENZAPRINE (FLEXERIL) 10 MG TABLET    Take 10 mg by mouth 2 times daily as needed     DOCUSATE SODIUM (COLACE) 100 MG CAPSULE    Take 100 mg by mouth 2 times daily as needed for Constipation    DULOXETINE (CYMBALTA) 60 MG EXTENDED RELEASE CAPSULE    Take 60 mg by mouth daily    ETANERCEPT (ENBREL) 50 MG/ML INJECTION    Inject 50 mg into the skin every 7 days. FLUTICASONE (FLONASE) 50 MCG/ACT NASAL SPRAY    INHALE 2 SPRAYS IN EACH NOSTRIL DAILY    GABAPENTIN (NEURONTIN) 300 MG CAPSULE    Take 300 mg by mouth 2 times daily. INSULIN GLARGINE (LANTUS) 100 UNIT/ML INJECTION VIAL    Inject 30 Units into the skin nightly     INSULIN LISPRO (HUMALOG) 100 UNIT/ML INJECTION VIAL    Inject 0-12 Units into the skin 3 times daily (with meals)    INSULIN SYRINGE-NEEDLE U-100 31G X 5/16\" 0.5 ML MISC    USE 5 TIMES DAILY    IPRATROPIUM (ATROVENT) 0.06 % NASAL SPRAY    USE 2 SPRAYS BY NASAL ROUTE 2-4 TIMES DAILY    LATANOPROST (XALATAN) 0.005 % OPHTHALMIC SOLUTION    Place 1 drop into both eyes nightly    LOSARTAN (COZAAR) 50 MG TABLET    Take 50 mg by mouth daily    MELOXICAM (MOBIC) 15 MG TABLET    Patient states taking only as needed    METOPROLOL SUCCINATE (TOPROL XL) 25 MG EXTENDED RELEASE TABLET    Take 25 mg by mouth daily    METRONIDAZOLE (FLAGYL) 250 MG TABLET    Crush one tablet into a fine powder, sprinkle in wound three times per week with dressing changes, as needed for malodor. MISC.  DEVICES (ACAPELLA) MISC    Take 1 Device by mouth as needed    MORPHINE (MS CONTIN) 15 MG EXTENDED RELEASE TABLET    TAKE 1 TABLET BY MOUTH EVERY DAY    NARCAN 4 MG/0.1ML LIQD NASAL SPRAY    PLEASE SEE or organization: None     Attends meetings of clubs or organizations: None     Relationship status: None    Intimate partner violence     Fear of current or ex partner: None     Emotionally abused: None     Physically abused: None     Forced sexual activity: None   Other Topics Concern    None   Social History Narrative    None       SCREENINGS        Patrice Coma Scale  Eye Opening: Spontaneous  Best Verbal Response: Oriented  Best Motor Response: Obeys commands  McKinney Coma Scale Score: 15                   REVIEW OF SYSTEMS    (2-9 systems for level 4, 10 or more for level 5)   Review of Systems   Constitutional: Positive for appetite change and fatigue. Negative for chills and fever. HENT: Negative for congestion, rhinorrhea and sore throat. Eyes: Negative for photophobia and visual disturbance. Respiratory: Positive for cough. Negative for shortness of breath and stridor. Cardiovascular: Positive for leg swelling. Negative for chest pain and palpitations. Gastrointestinal: Negative for abdominal pain, nausea and vomiting. Genitourinary: Negative for decreased urine volume. Musculoskeletal: Negative for back pain, neck pain and neck stiffness. Skin: Negative for rash. Allergic/Immunologic: Negative for environmental allergies. Neurological: Positive for dizziness and light-headedness. Hematological: Negative for adenopathy. Psychiatric/Behavioral: Negative for confusion. PHYSICAL EXAM    (up to 7 for level 4, 8 or more for level 5)     ED Triage Vitals [04/23/21 1515]   BP Temp Temp Source Pulse Resp SpO2 Height Weight   (!) 105/55 99.9 °F (37.7 °C) Oral 78 16 94 % 5' 10\" (1.778 m) 164 lb (74.4 kg)       Physical Exam  Constitutional:       General: She is not in acute distress. Appearance: She is not diaphoretic. HENT:      Head: Normocephalic and atraumatic. Eyes:      Pupils: Pupils are equal, round, and reactive to light.    Neck:      Musculoskeletal: Normal range of motion and neck supple. Trachea: No tracheal deviation. Cardiovascular:      Rate and Rhythm: Normal rate and regular rhythm. Pulmonary:      Effort: Pulmonary effort is normal. No respiratory distress. Breath sounds: Rhonchi present. Abdominal:      General: There is no distension. Palpations: Abdomen is soft. Musculoskeletal: Normal range of motion. Right lower leg: Edema present. Left lower leg: Edema present. Skin:     General: Skin is warm. Neurological:      Mental Status: She is oriented to person, place, and time. DIAGNOSTIC RESULTS     EKG: All EKG's are interpreted by the Emergency Department Physician who either signs or Co-signs this chart in the absence of a cardiologist.      The Ekg interpreted by me shows  normal sinus rhythm with a rate of 78  Axis is   Left axis deviation  QTc is  within an acceptable range  Intervals and Durations are unremarkable. ST Segments: ST depression V4-V6 which does appear to be new compared EKG June 28, 2020            RADIOLOGY:   Non-plain film images such as CT, Ultrasound and MRI are read by the radiologist. Plain radiographic images are visualized and preliminarily interpreted by the emergency physician. Interpretation per the Radiologist below, if available at the time of this note:    CTA PULMONARY W CONTRAST   Final Result   No evidence of pulmonary embolism. Diffuse emphysematous changes including centrilobular and paraseptal. Mild   patchy opacities are noted in the bilateral lower lungs which could indicate   an inflammatory/post inflammatory process. Small area of consolidation in   the left perihilar region which may be concerning for underlying airspace   disease process.          XR CHEST (2 VW)   Final Result   Bilateral parahilar linear opacities could represent pulmonary edema or   infection               LABS:  Labs Reviewed   CBC WITH AUTO DIFFERENTIAL - Abnormal; Notable for the following components:       Result Value    RBC 3.92 (*)     Hemoglobin 11.1 (*)     Hematocrit 33.8 (*)     RDW 15.9 (*)     All other components within normal limits    Narrative:     Performed at:  [facility],  [place]   Phone [phone]   COMPREHENSIVE METABOLIC PANEL W/ REFLEX TO MG FOR LOW K - Abnormal; Notable for the following components:    Sodium 131 (*)     Chloride 89 (*)     Glucose 233 (*)     GFR Non-African American 55 (*)     Albumin/Globulin Ratio 0.9 (*)     Alkaline Phosphatase 144 (*)     ALT 8 (*)     All other components within normal limits    Narrative:     Cristina Erickson  [facility] tel. [phone],  Chemistry results called to and read back by Nasreen Judd RN, 04/23/2021  19:25, by Ebony Torre  Performed at:  [place] [address],  [place]   Phone [phone]   TROPONIN - Abnormal; Notable for the following components:    Troponin 0.59 (*)     All other components within normal limits    Narrative:     [address] [phone],  Chemistry results called to and read back by Nasreen Judd RN, 04/23/2021  19:25, by Ebony Torre  Performed at:  [place] [address],  [place]   Phone [phone]   BRAIN NATRIURETIC PEPTIDE - Abnormal; Notable for the following components:    Pro-BNP 3,348 (*)     All other components within normal limits    Narrative:     [address] [phone],  Chemistry results called to and read back by Nasreen Judd RN, 04/23/2021  19:25, by Ebony Torre  Performed at:  [place] [address],  [place], [place]   Phone [phone]   D-DIMER, QUANTITATIVE - Abnormal; Notable for the following components:    D-Dimer, Quant >5250 (*)     All other components within normal limits    Narrative:     Performed at:  [facility]  3000 lateral depressions trop elevated no STEMI, ASA, heparin    CXR ? Pna, new cough low grade fever, will cover with abx    Pressures remained stable in the low 100s. She is receiving careful fluids now based on BMP. Patient excepted for transfer by Dr. Rita Jenkins at [facility].    CT PE study negative for pulmonary embolus. Concerning for infection, on antibiotics  CRITICAL CARE TIME   Total Critical Care time was 30 minutes, excluding separately reportable procedures. There was a high probability of clinically significant/life threatening deterioration in the patient's condition which required my urgent intervention. Clinical concern NSTEMI  Intervention history physical, chart review, charting, discussion with admission services,     CONSULTS:  IP CONSULT TO HOSPITALIST  PHARMACY TO DOSE MEDICATION    PROCEDURES:  Unless otherwise noted below, none     Procedures        FINAL IMPRESSION      1. NSTEMI (non-ST elevated myocardial infarction) ([facility]    2. Pneumonia due to organism    3. Syncope and collapse          DISPOSITION/PLAN   DISPOSITION Decision To Transfer 04/23/2021 07:40:16 PM      PATIENT REFERRED TO:  No follow-up provider specified. DISCHARGE MEDICATIONS:  New Prescriptions    No medications on file     Controlled Substances Monitoring:     No flowsheet data found.     (Please note that portions of this note were completed with a voice recognition program.  Efforts were made to edit the dictations but occasionally words are mis-transcribed.)    Yomi Collins DO (electronically signed)  Attending Emergency Physician            Yomi Collins DO  04/23/21 3298

## 2021-04-24 ENCOUNTER — HOSPITAL ENCOUNTER (INPATIENT)
Age: 71
LOS: 16 days | Discharge: HOME HEALTH | DRG: 233 | End: 2021-05-10
Attending: INTERNAL MEDICINE | Admitting: THORACIC SURGERY (CARDIOTHORACIC VASCULAR SURGERY)
Payer: MEDICARE

## 2021-04-24 PROBLEM — D64.9 ANEMIA: Status: ACTIVE | Noted: 2017-10-19

## 2021-04-24 LAB
ANION GAP SERPL CALCULATED.3IONS-SCNC: 13 MMOL/L (ref 3–16)
APTT: 32.4 SEC (ref 24.2–36.2)
APTT: 48.1 SEC (ref 24.2–36.2)
APTT: 63.9 SEC (ref 24.2–36.2)
BUN BLDV-MCNC: 15 MG/DL (ref 7–20)
CALCIUM SERPL-MCNC: 8.3 MG/DL (ref 8.3–10.6)
CHLORIDE BLD-SCNC: 93 MMOL/L (ref 99–110)
CHOLESTEROL, TOTAL: 81 MG/DL (ref 0–199)
CO2: 28 MMOL/L (ref 21–32)
CREAT SERPL-MCNC: 0.9 MG/DL (ref 0.6–1.2)
EKG ATRIAL RATE: 76 BPM
EKG ATRIAL RATE: 78 BPM
EKG DIAGNOSIS: NORMAL
EKG DIAGNOSIS: NORMAL
EKG P AXIS: 66 DEGREES
EKG P AXIS: 66 DEGREES
EKG P-R INTERVAL: 164 MS
EKG P-R INTERVAL: 180 MS
EKG Q-T INTERVAL: 424 MS
EKG Q-T INTERVAL: 424 MS
EKG QRS DURATION: 100 MS
EKG QRS DURATION: 104 MS
EKG QTC CALCULATION (BAZETT): 477 MS
EKG QTC CALCULATION (BAZETT): 483 MS
EKG R AXIS: -13 DEGREES
EKG R AXIS: -7 DEGREES
EKG T AXIS: 59 DEGREES
EKG T AXIS: 73 DEGREES
EKG VENTRICULAR RATE: 76 BPM
EKG VENTRICULAR RATE: 78 BPM
GFR AFRICAN AMERICAN: >60
GFR NON-AFRICAN AMERICAN: >60
GLUCOSE BLD-MCNC: 107 MG/DL (ref 70–99)
GLUCOSE BLD-MCNC: 227 MG/DL (ref 70–99)
GLUCOSE BLD-MCNC: 303 MG/DL (ref 70–99)
GLUCOSE BLD-MCNC: 306 MG/DL (ref 70–99)
GLUCOSE BLD-MCNC: 359 MG/DL (ref 70–99)
GLUCOSE BLD-MCNC: 63 MG/DL (ref 70–99)
HCT VFR BLD CALC: 30.1 % (ref 36–48)
HDLC SERPL-MCNC: 30 MG/DL (ref 40–60)
HEMOGLOBIN: 9.8 G/DL (ref 12–16)
LDL CHOLESTEROL CALCULATED: 29 MG/DL
LV EF: 55 %
LVEF MODALITY: NORMAL
MAGNESIUM: 1.8 MG/DL (ref 1.8–2.4)
MCH RBC QN AUTO: 27.7 PG (ref 26–34)
MCHC RBC AUTO-ENTMCNC: 32.5 G/DL (ref 31–36)
MCV RBC AUTO: 85.2 FL (ref 80–100)
PDW BLD-RTO: 15.4 % (ref 12.4–15.4)
PERFORMED ON: ABNORMAL
PLATELET # BLD: 207 K/UL (ref 135–450)
PMV BLD AUTO: 8.6 FL (ref 5–10.5)
POTASSIUM REFLEX MAGNESIUM: 3.3 MMOL/L (ref 3.5–5.1)
RBC # BLD: 3.53 M/UL (ref 4–5.2)
SODIUM BLD-SCNC: 134 MMOL/L (ref 136–145)
TRIGL SERPL-MCNC: 110 MG/DL (ref 0–150)
TROPONIN: 0.57 NG/ML
TROPONIN: 0.58 NG/ML
VLDLC SERPL CALC-MCNC: 22 MG/DL
WBC # BLD: 5.4 K/UL (ref 4–11)

## 2021-04-24 PROCEDURE — 6370000000 HC RX 637 (ALT 250 FOR IP): Performed by: NURSE PRACTITIONER

## 2021-04-24 PROCEDURE — 94761 N-INVAS EAR/PLS OXIMETRY MLT: CPT

## 2021-04-24 PROCEDURE — 94760 N-INVAS EAR/PLS OXIMETRY 1: CPT

## 2021-04-24 PROCEDURE — 80048 BASIC METABOLIC PNL TOTAL CA: CPT

## 2021-04-24 PROCEDURE — 85730 THROMBOPLASTIN TIME PARTIAL: CPT

## 2021-04-24 PROCEDURE — 80061 LIPID PANEL: CPT

## 2021-04-24 PROCEDURE — 2580000003 HC RX 258: Performed by: INTERNAL MEDICINE

## 2021-04-24 PROCEDURE — 87040 BLOOD CULTURE FOR BACTERIA: CPT

## 2021-04-24 PROCEDURE — 93306 TTE W/DOPPLER COMPLETE: CPT

## 2021-04-24 PROCEDURE — 83735 ASSAY OF MAGNESIUM: CPT

## 2021-04-24 PROCEDURE — 83036 HEMOGLOBIN GLYCOSYLATED A1C: CPT

## 2021-04-24 PROCEDURE — 87205 SMEAR GRAM STAIN: CPT

## 2021-04-24 PROCEDURE — 6360000002 HC RX W HCPCS: Performed by: NURSE PRACTITIONER

## 2021-04-24 PROCEDURE — 2580000003 HC RX 258: Performed by: NURSE PRACTITIONER

## 2021-04-24 PROCEDURE — 2700000000 HC OXYGEN THERAPY PER DAY

## 2021-04-24 PROCEDURE — 82088 ASSAY OF ALDOSTERONE: CPT

## 2021-04-24 PROCEDURE — 84484 ASSAY OF TROPONIN QUANT: CPT

## 2021-04-24 PROCEDURE — 93010 ELECTROCARDIOGRAM REPORT: CPT | Performed by: INTERNAL MEDICINE

## 2021-04-24 PROCEDURE — 85027 COMPLETE CBC AUTOMATED: CPT

## 2021-04-24 PROCEDURE — 99223 1ST HOSP IP/OBS HIGH 75: CPT | Performed by: INTERNAL MEDICINE

## 2021-04-24 PROCEDURE — 1200000000 HC SEMI PRIVATE

## 2021-04-24 PROCEDURE — 87070 CULTURE OTHR SPECIMN AEROBIC: CPT

## 2021-04-24 PROCEDURE — 6370000000 HC RX 637 (ALT 250 FOR IP): Performed by: INTERNAL MEDICINE

## 2021-04-24 PROCEDURE — 36415 COLL VENOUS BLD VENIPUNCTURE: CPT

## 2021-04-24 PROCEDURE — 6360000002 HC RX W HCPCS: Performed by: INTERNAL MEDICINE

## 2021-04-24 RX ORDER — ASPIRIN 81 MG/1
81 TABLET ORAL DAILY
Status: DISCONTINUED | OUTPATIENT
Start: 2021-04-24 | End: 2021-05-03

## 2021-04-24 RX ORDER — INSULIN GLARGINE 100 [IU]/ML
30 INJECTION, SOLUTION SUBCUTANEOUS NIGHTLY
Status: DISCONTINUED | OUTPATIENT
Start: 2021-04-24 | End: 2021-04-28

## 2021-04-24 RX ORDER — ATORVASTATIN CALCIUM 40 MG/1
40 TABLET, FILM COATED ORAL NIGHTLY
Status: DISCONTINUED | OUTPATIENT
Start: 2021-04-24 | End: 2021-05-03

## 2021-04-24 RX ORDER — SODIUM CHLORIDE 0.9 % (FLUSH) 0.9 %
5-40 SYRINGE (ML) INJECTION PRN
Status: DISCONTINUED | OUTPATIENT
Start: 2021-04-24 | End: 2021-05-03

## 2021-04-24 RX ORDER — ONDANSETRON 2 MG/ML
4 INJECTION INTRAMUSCULAR; INTRAVENOUS EVERY 6 HOURS PRN
Status: DISCONTINUED | OUTPATIENT
Start: 2021-04-24 | End: 2021-05-03

## 2021-04-24 RX ORDER — SODIUM CHLORIDE 9 MG/ML
INJECTION, SOLUTION INTRAVENOUS CONTINUOUS
Status: ACTIVE | OUTPATIENT
Start: 2021-04-24 | End: 2021-04-24

## 2021-04-24 RX ORDER — DULOXETIN HYDROCHLORIDE 60 MG/1
60 CAPSULE, DELAYED RELEASE ORAL DAILY
Status: DISCONTINUED | OUTPATIENT
Start: 2021-04-24 | End: 2021-05-10 | Stop reason: HOSPADM

## 2021-04-24 RX ORDER — HEPARIN SODIUM 10000 [USP'U]/100ML
1150 INJECTION, SOLUTION INTRAVENOUS CONTINUOUS
Status: DISCONTINUED | OUTPATIENT
Start: 2021-04-24 | End: 2021-04-24

## 2021-04-24 RX ORDER — TORSEMIDE 20 MG/1
40 TABLET ORAL DAILY
Status: DISCONTINUED | OUTPATIENT
Start: 2021-04-24 | End: 2021-04-28

## 2021-04-24 RX ORDER — ACETAMINOPHEN 650 MG/1
650 SUPPOSITORY RECTAL EVERY 6 HOURS PRN
Status: DISCONTINUED | OUTPATIENT
Start: 2021-04-24 | End: 2021-05-10 | Stop reason: HOSPADM

## 2021-04-24 RX ORDER — MORPHINE SULFATE 15 MG/1
15 TABLET, FILM COATED, EXTENDED RELEASE ORAL DAILY
Status: DISCONTINUED | OUTPATIENT
Start: 2021-04-24 | End: 2021-04-25

## 2021-04-24 RX ORDER — ACETAMINOPHEN 325 MG/1
650 TABLET ORAL EVERY 6 HOURS PRN
Status: DISCONTINUED | OUTPATIENT
Start: 2021-04-24 | End: 2021-05-10 | Stop reason: HOSPADM

## 2021-04-24 RX ORDER — SODIUM CHLORIDE 0.9 % (FLUSH) 0.9 %
5-40 SYRINGE (ML) INJECTION EVERY 12 HOURS SCHEDULED
Status: DISCONTINUED | OUTPATIENT
Start: 2021-04-24 | End: 2021-05-03

## 2021-04-24 RX ORDER — METOPROLOL SUCCINATE 25 MG/1
25 TABLET, EXTENDED RELEASE ORAL DAILY
Status: DISCONTINUED | OUTPATIENT
Start: 2021-04-24 | End: 2021-05-03

## 2021-04-24 RX ORDER — CYCLOBENZAPRINE HCL 10 MG
10 TABLET ORAL 2 TIMES DAILY PRN
Status: DISCONTINUED | OUTPATIENT
Start: 2021-04-24 | End: 2021-05-10 | Stop reason: HOSPADM

## 2021-04-24 RX ORDER — POLYETHYLENE GLYCOL 3350 17 G/17G
17 POWDER, FOR SOLUTION ORAL DAILY PRN
Status: DISCONTINUED | OUTPATIENT
Start: 2021-04-24 | End: 2021-05-03

## 2021-04-24 RX ORDER — PANTOPRAZOLE SODIUM 40 MG/1
40 TABLET, DELAYED RELEASE ORAL DAILY
Status: DISCONTINUED | OUTPATIENT
Start: 2021-04-24 | End: 2021-05-03

## 2021-04-24 RX ORDER — HEPARIN SODIUM 1000 [USP'U]/ML
2000 INJECTION, SOLUTION INTRAVENOUS; SUBCUTANEOUS PRN
Status: DISCONTINUED | OUTPATIENT
Start: 2021-04-24 | End: 2021-05-03

## 2021-04-24 RX ORDER — ATORVASTATIN CALCIUM 40 MG/1
40 TABLET, FILM COATED ORAL NIGHTLY
Status: DISCONTINUED | OUTPATIENT
Start: 2021-04-24 | End: 2021-04-24 | Stop reason: SDUPTHER

## 2021-04-24 RX ORDER — NICOTINE POLACRILEX 4 MG
15 LOZENGE BUCCAL PRN
Status: DISCONTINUED | OUTPATIENT
Start: 2021-04-24 | End: 2021-05-03

## 2021-04-24 RX ORDER — OXYCODONE AND ACETAMINOPHEN 10; 325 MG/1; MG/1
1 TABLET ORAL DAILY
Status: ON HOLD | COMMUNITY
End: 2021-12-28 | Stop reason: HOSPADM

## 2021-04-24 RX ORDER — SODIUM CHLORIDE 9 MG/ML
25 INJECTION, SOLUTION INTRAVENOUS PRN
Status: DISCONTINUED | OUTPATIENT
Start: 2021-04-24 | End: 2021-05-03

## 2021-04-24 RX ORDER — PROMETHAZINE HYDROCHLORIDE 25 MG/1
12.5 TABLET ORAL EVERY 6 HOURS PRN
Status: DISCONTINUED | OUTPATIENT
Start: 2021-04-24 | End: 2021-05-03

## 2021-04-24 RX ORDER — HEPARIN SODIUM 10000 [USP'U]/100ML
13 INJECTION, SOLUTION INTRAVENOUS CONTINUOUS
Status: DISCONTINUED | OUTPATIENT
Start: 2021-04-24 | End: 2021-04-28

## 2021-04-24 RX ORDER — HEPARIN SODIUM 1000 [USP'U]/ML
2000 INJECTION, SOLUTION INTRAVENOUS; SUBCUTANEOUS ONCE
Status: COMPLETED | OUTPATIENT
Start: 2021-04-24 | End: 2021-04-24

## 2021-04-24 RX ORDER — ASPIRIN 81 MG/1
81 TABLET, CHEWABLE ORAL DAILY
Status: DISCONTINUED | OUTPATIENT
Start: 2021-04-25 | End: 2021-04-24 | Stop reason: SDUPTHER

## 2021-04-24 RX ORDER — ALBUTEROL SULFATE 90 UG/1
2 AEROSOL, METERED RESPIRATORY (INHALATION) EVERY 4 HOURS PRN
Status: DISCONTINUED | OUTPATIENT
Start: 2021-04-24 | End: 2021-05-03

## 2021-04-24 RX ORDER — NITROGLYCERIN 0.4 MG/1
0.4 TABLET SUBLINGUAL EVERY 5 MIN PRN
Status: DISCONTINUED | OUTPATIENT
Start: 2021-04-24 | End: 2021-05-03

## 2021-04-24 RX ORDER — DEXTROSE MONOHYDRATE 25 G/50ML
12.5 INJECTION, SOLUTION INTRAVENOUS PRN
Status: DISCONTINUED | OUTPATIENT
Start: 2021-04-24 | End: 2021-05-03

## 2021-04-24 RX ORDER — LOSARTAN POTASSIUM 25 MG/1
50 TABLET ORAL DAILY
Status: DISCONTINUED | OUTPATIENT
Start: 2021-04-24 | End: 2021-04-29

## 2021-04-24 RX ORDER — HEPARIN SODIUM 1000 [USP'U]/ML
4000 INJECTION, SOLUTION INTRAVENOUS; SUBCUTANEOUS PRN
Status: DISCONTINUED | OUTPATIENT
Start: 2021-04-24 | End: 2021-05-03

## 2021-04-24 RX ADMIN — ROFLUMILAST 500 MCG: 500 TABLET ORAL at 20:36

## 2021-04-24 RX ADMIN — SODIUM CHLORIDE, PRESERVATIVE FREE 10 ML: 5 INJECTION INTRAVENOUS at 09:03

## 2021-04-24 RX ADMIN — SODIUM CHLORIDE 25 ML: 9 INJECTION, SOLUTION INTRAVENOUS at 20:37

## 2021-04-24 RX ADMIN — PANTOPRAZOLE SODIUM 40 MG: 40 TABLET, DELAYED RELEASE ORAL at 09:03

## 2021-04-24 RX ADMIN — Medication 10 ML: at 11:51

## 2021-04-24 RX ADMIN — TICAGRELOR 90 MG: 90 TABLET ORAL at 20:36

## 2021-04-24 RX ADMIN — CYCLOBENZAPRINE 10 MG: 10 TABLET, FILM COATED ORAL at 20:36

## 2021-04-24 RX ADMIN — ATORVASTATIN CALCIUM 40 MG: 40 TABLET, FILM COATED ORAL at 20:36

## 2021-04-24 RX ADMIN — ACETAMINOPHEN 650 MG: 325 TABLET ORAL at 12:16

## 2021-04-24 RX ADMIN — DULOXETINE HYDROCHLORIDE 60 MG: 60 CAPSULE, DELAYED RELEASE ORAL at 09:02

## 2021-04-24 RX ADMIN — TORSEMIDE 40 MG: 20 TABLET ORAL at 09:02

## 2021-04-24 RX ADMIN — HEPARIN SODIUM 2000 UNITS: 1000 INJECTION INTRAVENOUS; SUBCUTANEOUS at 14:02

## 2021-04-24 RX ADMIN — HEPARIN SODIUM 4000 UNITS: 1000 INJECTION INTRAVENOUS; SUBCUTANEOUS at 06:24

## 2021-04-24 RX ADMIN — INSULIN LISPRO 6 UNITS: 100 INJECTION, SOLUTION INTRAVENOUS; SUBCUTANEOUS at 17:43

## 2021-04-24 RX ADMIN — CEFTRIAXONE SODIUM 1000 MG: 1 INJECTION, POWDER, FOR SOLUTION INTRAMUSCULAR; INTRAVENOUS at 20:37

## 2021-04-24 RX ADMIN — SODIUM CHLORIDE, PRESERVATIVE FREE 10 ML: 5 INJECTION INTRAVENOUS at 20:37

## 2021-04-24 RX ADMIN — ONDANSETRON 4 MG: 2 INJECTION INTRAMUSCULAR; INTRAVENOUS at 11:51

## 2021-04-24 RX ADMIN — HEPARIN SODIUM 13 ML/HR: 10000 INJECTION, SOLUTION INTRAVENOUS at 20:36

## 2021-04-24 RX ADMIN — ASPIRIN 81 MG: 81 TABLET, COATED ORAL at 09:50

## 2021-04-24 RX ADMIN — INSULIN LISPRO 15 UNITS: 100 INJECTION, SOLUTION INTRAVENOUS; SUBCUTANEOUS at 12:56

## 2021-04-24 RX ADMIN — TICAGRELOR 180 MG: 90 TABLET ORAL at 09:02

## 2021-04-24 RX ADMIN — Medication 10 ML: at 06:24

## 2021-04-24 RX ADMIN — MORPHINE SULFATE 15 MG: 15 TABLET, FILM COATED, EXTENDED RELEASE ORAL at 09:03

## 2021-04-24 RX ADMIN — HEPARIN SODIUM 13 ML/HR: 10000 INJECTION, SOLUTION INTRAVENOUS at 14:01

## 2021-04-24 RX ADMIN — SODIUM CHLORIDE: 9 INJECTION, SOLUTION INTRAVENOUS at 06:24

## 2021-04-24 ASSESSMENT — PAIN SCALES - GENERAL
PAINLEVEL_OUTOF10: 10
PAINLEVEL_OUTOF10: 8
PAINLEVEL_OUTOF10: 10
PAINLEVEL_OUTOF10: 9

## 2021-04-24 ASSESSMENT — PAIN DESCRIPTION - PAIN TYPE
TYPE: CHRONIC PAIN

## 2021-04-24 ASSESSMENT — PAIN DESCRIPTION - LOCATION
LOCATION: BACK
LOCATION: BACK

## 2021-04-24 ASSESSMENT — PAIN DESCRIPTION - DESCRIPTORS: DESCRIPTORS: ACHING;CONSTANT;SORE

## 2021-04-24 NOTE — H&P
Hospital Medicine History & Physical      PCP: Anu Valdovinos MD    Date of Admission: 4/24/2021    Date of Service: Pt seen/examined on 04/24/21  and Admitted to Inpatient with expected LOS greater than two midnights due to medical therapy. Chief Complaint:  Direct admit from [facility] ED for NSTEMI evaluation. History Of Present Illness:    72 y.o. female with PMH as mentioned below presented to [facility] ED with c/o lightheadedness, dizziness, hypotension at home with episodes of syncope. Patient upon arrival to ED noted to be borderline blood pressure with 105/55. No tachycardia, oxygen saturations 94%. Patient reported exertional shortness of breath but denied any chest pain per se. She denies any fever, chills, nausea, vomiting, diarrhea. Denies any history of blood clots. No personal/family history of cancer. She reports that she has decubitus pressure ulcer in the sacral area for which she follows with Dr. Henrietta Norris and currently on wound VAC therapy (MWF) . Patient also reported that she is wheelchair-bound for the past 1 and half year but able to walk to the bathroom with walker at home. She does not have home care. Ex-smoker quit years ago. Denies alcohol, illicit drug use. [facility] ED course : Patient had basic labs which showed elevated proBNP (3348)  and D-dimer (5250) . She had CTPA which was negative for pulmonary embolism. EKG showed nonspecific ST-T wave changes. Troponin persistently elevated ~0.58. Cardiology consulted from ED and patient started on heparin gtt. For NSTEMI management and transferred to [facility] for admission for further evaluation and management    Upon evaluation by me this morning, patient on heparin GTT. Denies any chest pain. Currently offers no new complaints at this time. Discussed care plan with patient. Evaluated by cardiology this morning with plans for obtaining echocardiogram and tentatively scheduled for [facility] on Monday, 4/26/2021.     Past Medical History:      Diagnosis Date    Atherosclerosis of native artery of right lower extremity with rest pain ([address] 07/25/2017    Back pain     Branch retinal vein occlusion 07/20/2012    Bronchiectasis with acute exacerbation (HCC)     Closed compression fracture of thoracic vertebra ([address] 01/15/2020    Closed fracture of facial bone with routine healing 11/21/2016    Closed jaw fracture ([address] 01/15/2020    Community acquired pneumonia of left lower lobe of lung     Compression fracture of L1 lumbar vertebra ([address] 01/15/2020    Fracture of tibial plateau, closed, left, initial encounter 12/05/2017    Minimally displaced zone I fracture of sacrum (HCC) 09/02/2020    Mucus plugging of bronchi     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 Bone density test last year showed severe osteoporosis. Recently, two broken vertebrae (L1, L2) due to coughing. Diagnosed with tracheomalacia and stated she must cough very hard to clear phlegm. Was coughing due to upper respiratory infections which have been treated. Hx of laminectomy and recent kyphoplasty.    Refractured her jawbone which was previously repaired wi    Post herpetic neuralgia     Proximal humerus fracture 10/01/2019    Shingles 05/2020    Sleep apnea     Temporal arteritis ([address] 07/10/2013    Tobacco use 10/19/2017    Vitreous hemorrhage, right eye ([address] 02/21/2020       Past Surgical History:        Procedure Laterality Date    ARTERY BIOPSY Right 03/01/2021    at [address]  5/30/13    left temporal artery biopsy    BACK SURGERY  08/2020    BRONCHOSCOPY      BRONCHOSCOPY N/A 6/12/2019    BRONCHOSCOPY ALVEOLAR LAVAGE performed by Shamar Arias MD at [address] [place] [address] ARTHROSCOPY      left    KYPHOSIS SURGERY      LAMINECTOMY      MANDIBLE FRACTURE SURGERY      MANDIBLE FRACTURE SURGERY  02/2020    OTHER SURGICAL HISTORY  01/08/2021    sacral wound debridement    PRESSURE ULCER DEBRIDEMENT N/A 1/8/2021    SACRAL WOUND DEBRIDEMENT performed by Pavan Gonzalez MD at [facility] SEPTOPLASTY  5/7/2013    FESS with balloon    SPINAL FUSION      TUBAL LIGATION      UPPER GASTROINTESTINAL ENDOSCOPY  4/8/2014    Dilitation       Medications Prior to Admission:    Prior to Admission medications    Medication Sig Start Date End Date Taking? Authorizing Provider   metroNIDAZOLE (FLAGYL) 250 MG tablet Crush one tablet into a fine powder, sprinkle in wound three times per week with dressing changes, as needed for malodor. 1/20/21   Bandar Carrion MD   docusate sodium (COLACE) 100 MG capsule Take 100 mg by mouth 2 times daily as needed for Constipation    Historical Provider, MD   DULoxetine (CYMBALTA) 60 MG extended release capsule Take 60 mg by mouth daily    Historical Provider, MD   gabapentin (NEURONTIN) 300 MG capsule Take 300 mg by mouth 2 times daily.     Historical Provider, MD   latanoprost (XALATAN) 0.005 % ophthalmic solution Place 1 drop into both eyes nightly    Historical Provider, MD   losartan (COZAAR) 50 MG tablet Take 50 mg by mouth daily    Historical Provider, MD   polyethylene glycol (GLYCOLAX) 17 g packet Take 17 g by mouth daily as needed for Constipation    Historical Provider, MD   Teriparatide, Recombinant, (FORTEO) 600 MCG/2.4ML SOPN injection Inject 20 mcg into the skin daily    Historical Provider, MD   torsemide (DEMADEX) 20 MG tablet Take 40 mg by mouth daily    Historical Provider, MD   azithromycin (ZITHROMAX) 250 MG tablet TAKE 1 TABLET BY MOUTH EVERY DAY 11/23/20   Nati Narayanan MD   NARCAN 4 MG/0.1ML LIQD nasal spray PLEASE SEE ATTACHED FOR DETAILED DIRECTIONS 10/20/20   Historical Provider, MD   morphine (MS CONTIN) 15 MG extended release tablet TAKE 1 TABLET BY MOUTH EVERY DAY 10/16/20   Historical Provider, MD   ipratropium (ATROVENT) 0.06 % nasal spray USE 2 SPRAYS BY NASAL ROUTE 2-4 TIMES DAILY 10/29/20   Ashley Humphries MD   Roflumilast (DALIRESP) 500 MCG tablet Take 1 tablet by mouth daily 5/19/20   Ashley Humphries MD   albuterol sulfate  (90 Base) MCG/ACT inhaler INHALE 2 PUFFS INTO THE LUNGS EVERY 4 HOURS AS NEEDED FOR WHEEZING 1/20/20   Art Gibson MD   vitamin D (CHOLECALCIFEROL) 1000 UNIT TABS tablet Take 5,000 Units by mouth daily     Historical Provider, MD   calcium carbonate (OSCAL) 500 MG TABS tablet Take 500 mg by mouth daily    Historical Provider, MD   atorvastatin (LIPITOR) 40 MG tablet Take 40 mg by mouth 10/16/18   Historical Provider, MD   cyclobenzaprine (FLEXERIL) 10 MG tablet Take 10 mg by mouth 2 times daily as needed     Historical Provider, MD   Insulin Syringe-Needle U-100 31G X 5/16\" 0.5 ML MISC USE 5 TIMES DAILY 5/30/18   Historical Provider, MD   meloxicam (MOBIC) 15 MG tablet Patient states taking only as needed 4/7/18   Historical Provider, MD   ACCU-CHEK CEDRICK PLUS strip TEST 4 TIMES DAILY 6/1/18   Historical Provider, MD   metoprolol succinate (TOPROL XL) 25 MG extended release tablet Take 25 mg by mouth daily    Historical Provider, MD   insulin glargine (LANTUS) 100 UNIT/ML injection vial Inject 30 Units into the skin nightly  10/23/17   Alan Sr MD   aspirin EC 81 MG EC tablet Take 1 tablet by mouth daily 10/23/17   Alan Sr MD   insulin lispro (HUMALOG) 100 UNIT/ML injection vial Inject 0-12 Units into the skin 3 times daily (with meals) 10/23/17   Alan Sr MD   Misc. Devices (ACAPELLA) MISC Take 1 Device by mouth as needed 9/2/15   Juan Carlos Frias, APRN - CNP   fluticasone [facility]) 50 MCG/ACT nasal spray INHALE 2 SPRAYS IN [facility] NOSTRIL DAILY 11/25/13   Ashley Humphries MD   cetirizine (ZYRTEC) 10 MG tablet Take 10 mg by mouth daily.       Historical Provider, MD   etanercept (ENBREL) edema bilaterally. Full range of motion without deformity. Skin: Skin color, texture, turgor normal.  No rashes or lesions. Sacral decub ulcer with wound VAC in place. Neurologic:  Neurovascularly intact without any focal sensory/motor deficits. Cranial nerves: II-XII intact, grossly non-focal.  Psychiatric:  Alert and oriented, thought content appropriate, normal insight  Capillary Refill: Brisk,< 3 seconds   Peripheral Pulses: +2 palpable, equal bilaterally       Labs:   Recent Labs     04/23/21 1837 04/24/21  0448   WBC 6.6 5.4   HGB 11.1* 9.8*   HCT 33.8* 30.1*    207     Recent Labs     04/23/21 1837 04/24/21  0453   * 134*   K 3.6 3.3*   CL 89* 93*   CO2 31 28   BUN 16 15   CREATININE 1.0 0.9   CALCIUM 8.8 8.3     Recent Labs     04/23/21 1837   AST 28   ALT 8*   BILITOT 0.6   ALKPHOS 144*     No results for input(s): INR in the last 72 hours. Recent Labs     04/23/21 1837 04/24/21 0448   TROPONINI 0.59* 0.58*       Urinalysis:    Lab Results   Component Value Date    NITRU Negative 04/23/2021    WBCUA 0-2 04/23/2021    BACTERIA 1+ 04/23/2021    RBCUA 5-10 04/23/2021    BLOODU SMALL 04/23/2021    SPECGRAV 1.010 04/23/2021    GLUCOSEU 100 04/23/2021    GLUCOSEU NEGATIVE 03/20/2010       EKG:  I have reviewed the EKG with the following interpretation: Normal sinus rhythm, nonspecific ST-T wave changes in lateral leads, prolonged QTC.     Radiology:    I have reviewed the Imaging  with the following interpretation:   CXR 4/23/2021  Bilateral parahilar linear opacities could represent pulmonary edema or   infection     CTA pulmonary with Contrast 4/23/2021  No evidence of pulmonary embolism.       Diffuse emphysematous changes including centrilobular and paraseptal. Mild   patchy opacities are noted in the bilateral lower lungs which could indicate   an inflammatory/post inflammatory process.  Small area of consolidation in   the left perihilar region which may be concerning for underlying airspace   disease process. Active Hospital Problems    Diagnosis Date Noted    NSTEMI (non-ST elevated myocardial infarction) ([facility] [I21.4] 04/23/2021    Hypokalemia [E87.6]     Anemia [D64.9] 10/19/2017    Coronary artery disease [I25.10] 07/03/2017     ASSESSMENT/PLAN:  1. NSTEMI - POA   -Admitted to  MedSurg/telemetry; EKG abnormal with ST-T changes in lateral leads. Troponin  0.58 on presentation being trended.  -On heparin GTT. Cardiology consulted with plans to do [facility] on 4/26/2021 tentatively. -Echo ordered to rule out wall motion abnormalities.  -Continue aspirin, Lipitor, beta-blocker, ARB. Sublingual nitro and morphine as needed for pain     2. Hypertension -optimal control on home medication; continue    3. Type 2 diabetes mellitus -on Lantus 30 units nightly; continue and  SSI per BGM. -Check HbA1c.    4.  Chronic back pain on opioids -on MS Contin 15 mg daily; continue    5. Exertional shortness of breath on admission likely due to chronic COPD without exacerbation  -CTPA negative for PE but showed diffuse emphysematous changes in bilateral lower lungs. Likely presentation related to NSTEMI. Continue current care. -HHN as needed  -Elevated pro calcitonin (0.75) on admission. Started on empiric Rocephin. Will follow blood and sputum cultures. 6. Ex-smoker  -reports that she quit few years ago. DVT Prophylaxis: Heparin GTT  Diet: Diet NPO Effective Now  Code Status: Full Code    PT/OT Eval Status: Will need    Dispo -anticipate more than 2 midnight stay in the hospital     Gricelda Phelan MD    The note was completed using Dragon -speech recognition software & EMR  . Every effort was made to ensure accuracy; however, inadvertent computerized transcription errors may be present. Thank you Jayda Skaggs MD for the opportunity to be involved in this patient's care. If you have any questions or concerns please feel free to contact me at [phone].

## 2021-04-24 NOTE — PLAN OF CARE
Problem: Falls - Risk of:  Goal: Will remain free from falls  Description: Will remain free from falls  Outcome: Ongoing     Problem: Skin Integrity:  Goal: Will show no infection signs and symptoms  Description: Will show no infection signs and symptoms  Outcome: Ongoing     Problem: Pain:  Goal: Control of acute pain  Description: Control of acute pain  Outcome: Ongoing

## 2021-04-24 NOTE — PROGRESS NOTES
Assessment complete. VSS. Denies CP. Pt reports back pain and has stage 4 with wound vac in place. Pt will need wound consult if she will be here several days and require dressing changes.

## 2021-04-24 NOTE — PROGRESS NOTES
RESPIRATORY THERAPY ASSESSMENT FOR PRN PATIENTS    Name:  Katina Doty  Medical Record Number:  [id number]  Age: 72 y.o. Gender: female  : 1950  Today's Date:  2021  Room:  [address] [place] [address]-  Patient Admission Diagnosis NSTEMI      Allergies  Allergies   Allergen Reactions    Atenolol      Cough         Vital Signs   BP (!) 109/51   Pulse 68   Temp 98.2 °F (36.8 °C) (Oral)   Resp 18   SpO2 94%     Plan       Goals: medication delivery, mobilize retained secretions, volume expansion and improve oxygenation    Comments: Chart reviewed, continue home regimen. Plan of Care:  Albuterol PRN    Patient/caregiver was educated on the proper method of use of Respiratory Therapy device:  No:       Level of patient/caregiver understanding able to:   [] Verbalize understanding   [] Demonstrate understanding       [] Teach back        [] Needs reinforcement       []  No available caregiver               []  Other:     Response to education:       Electronically signed by Yanna Hanson RCP on 2021 at 4:48 AM

## 2021-04-24 NOTE — FLOWSHEET NOTE
04/24/21 1651   Encounter Summary   Services provided to: Patient and family together   Referral/Consult From: Patient; Family   Support System Children;Family members   Continue Visiting   (4/24 Pt received Holy Communion)   Complexity of Encounter Low   Length of Encounter 30 minutes   Spiritual/Yazidism   Type Spiritual support   Assessment Hopeful   Intervention Active listening;Explored feelings, thoughts, concerns;Prayer;Communion   Outcome Expressed gratitude;Engaged in conversation; Shared life review   Sacraments   Communion Family received communion;Patient received communion

## 2021-04-24 NOTE — PROGRESS NOTES
Physician Progress Note      Ely Coronel  CSN #:                  [id number]  :                       1950  ADMIT DATE:       2021 12:33 AM  DISCH DATE:  RESPONDING  PROVIDER #:        Harlan Reynoso MD          QUERY TEXT:    Pt admitted with NSTEMI and has chronic back pain and is on opioids. Please   document any correlating medical diagnosis in the medical record: The medical record reflects the following:  Risk Factors: Chronic back pain, pressure ulcer, NSTEMI, COPD  Clinical Indicators: Per H&P \"Chronic back pain on opioids -on MS Contin 15 mg   daily; continue\", multiple back surgeries  Treatment: Scheduled MS Contin, serial labs, supportive care  Options provided:  -- Opioid dependence  -- Other - I will add my own diagnosis  -- Disagree - Not applicable / Not valid  -- Disagree - Clinically unable to determine / Unknown  -- Refer to Clinical Documentation Reviewer    PROVIDER RESPONSE TEXT:    This patient is opioid dependent.     Query created by: Loretta Jacobs on 2021 3:35 PM      Electronically signed by:  Harlan Reynoso MD 2021 3:38 PM

## 2021-04-24 NOTE — FLOWSHEET NOTE
04/24/21 1400   Encounter Summary   Services provided to: Patient and family together   Referral/Consult From: [facility] Children;Family members   Continue Visiting   (4/24 Pt wants Holy Communion)   Complexity of Encounter Low   Length of Encounter 15 minutes   Spiritual/Worship   Type Spiritual support   Assessment Approachable; Hopeful   Intervention Active listening;Explored feelings, thoughts, concerns;Sustaining presence/ Ministry of presence;Placed on communion list   Outcome Expressed gratitude; Hopeful   Sacraments   Communion Patient/Family wants communion

## 2021-04-24 NOTE — ED NOTES
Report called to RN at [facility]. Pt to go to Room 352.  Pt transported at this time in stable condition via ambulance with peripheral IV x2, oxygen at 2lpm, cardiac monitor and heparin gtt at 8.5 mL/hr.      Milton Arellano RN  04/24/21 0005

## 2021-04-24 NOTE — CONSULTS
[address] [facility]  [phone]      Attending Physician: Aida Lemus MD  Reason for Consultation/Chief Complaint: Elevated trop    Subjective   History of Present Illness:  Sushant Pavon is a 72 y.o. patient who presented to the hospital with complaints of fatigue. States sleep >11hrs, and has never done this. + cough and felt could be PNA. No fevers. + thick phlegm. + SOB. + ankle edema (old). States had some CP last night but is now gone. Was a pressure like pain when she came back from test. Never had before. All CP gone now. Stated Tuesday noted BP was low and passed out. BP was 84/42. Past Medical History:   has a past medical history of Atherosclerosis of native artery of right lower extremity with rest pain ([address] Back pain, Branch retinal vein occlusion, Bronchiectasis with acute exacerbation ([address] Closed compression fracture of thoracic vertebra ([address] Closed fracture of facial bone with routine healing, Closed jaw fracture ([address] Community acquired pneumonia of left lower lobe of lung, Compression fracture of L1 lumbar vertebra ([address] Fracture of tibial plateau, closed, left, initial encounter, Minimally displaced zone I fracture of sacrum (HCC), Mucus plugging of bronchi, Osteomyelitis of mandible, Osteoporosis with pathological fracture, Post herpetic neuralgia, Proximal humerus fracture, Shingles, Sleep apnea, Temporal arteritis ([address] Tobacco use, and Vitreous hemorrhage, right eye ([address] Surgical History:   has a past surgical history that includes hernia repair; Mandible fracture surgery; Foot surgery; Elbow surgery; Cataract removal; Tubal ligation; Knee arthroscopy; Kyphosis surgery; laminectomy; Spinal fusion; Septoplasty (5/7/2013); Artery surgery (5/30/13); Upper gastrointestinal endoscopy (4/8/2014); bronchoscopy; bronchoscopy (N/A, 6/12/2019);  Mandible fracture surgery (02/2020); back surgery (08/2020); other surgical history (01/08/2021); ATTACHED FOR DETAILED DIRECTIONS 10/20/20   Historical Provider, MD   morphine (MS CONTIN) 15 MG extended release tablet TAKE 1 TABLET BY MOUTH EVERY DAY 10/16/20   Historical Provider, MD   ipratropium (ATROVENT) 0.06 % nasal spray USE 2 SPRAYS BY NASAL ROUTE 2-4 TIMES DAILY 10/29/20   Carmen Barrientos MD   Roflumilast (DALIRESP) 500 MCG tablet Take 1 tablet by mouth daily 5/19/20   Carmen Barrientos MD   albuterol sulfate  (90 Base) MCG/ACT inhaler INHALE 2 PUFFS INTO THE LUNGS EVERY 4 HOURS AS NEEDED FOR WHEEZING 1/20/20   Osman Moon MD   vitamin D (CHOLECALCIFEROL) 1000 UNIT TABS tablet Take 5,000 Units by mouth daily     Historical Provider, MD   calcium carbonate (OSCAL) 500 MG TABS tablet Take 500 mg by mouth daily    Historical Provider, MD   atorvastatin (LIPITOR) 40 MG tablet Take 40 mg by mouth 10/16/18   Historical Provider, MD   cyclobenzaprine (FLEXERIL) 10 MG tablet Take 10 mg by mouth 2 times daily as needed     Historical Provider, MD   Insulin Syringe-Needle U-100 31G X 5/16\" 0.5 ML MISC USE 5 TIMES DAILY 5/30/18   Historical Provider, MD   meloxicam (MOBIC) 15 MG tablet Patient states taking only as needed 4/7/18   Historical Provider, MD   ACCU-CHEK CEDRICK PLUS strip TEST 4 TIMES DAILY 6/1/18   Historical Provider, MD   metoprolol succinate (TOPROL XL) 25 MG extended release tablet Take 25 mg by mouth daily    Historical Provider, MD   insulin glargine (LANTUS) 100 UNIT/ML injection vial Inject 30 Units into the skin nightly  10/23/17   Quoc Karimi MD   aspirin EC 81 MG EC tablet Take 1 tablet by mouth daily 10/23/17   Quoc Karimi MD   insulin lispro (HUMALOG) 100 UNIT/ML injection vial Inject 0-12 Units into the skin 3 times daily (with meals) 10/23/17   Quoc Karimi MD   Misc.  Devices [facility]) MISC Take 1 Device by mouth as needed 9/2/15   Basil Marks, APRN - CNP   fluticasone [facility]) 50 MCG/ACT nasal spray INHALE 2 SPRAYS IN [facility] NOSTRIL DAILY 11/25/13   Carmen Barrientos MD cetirizine (ZYRTEC) 10 MG tablet Take 10 mg by mouth daily. Historical Provider, MD   etanercept (ENBREL) 50 MG/ML injection Inject 50 mg into the skin every 7 days.     Historical Provider, MD   omeprazole (PRILOSEC) 40 MG capsule Take 40 mg by mouth daily     Historical Provider, MD        CURRENT Medications:  albuterol sulfate  (90 Base) MCG/ACT inhaler 2 puff, Q4H PRN  aspirin EC tablet 81 mg, Daily  atorvastatin (LIPITOR) tablet 40 mg, Nightly  cyclobenzaprine (FLEXERIL) tablet 10 mg, BID PRN  DULoxetine (CYMBALTA) extended release capsule 60 mg, Daily  insulin glargine (LANTUS) injection vial 30 Units, Nightly  metoprolol succinate (TOPROL XL) extended release tablet 25 mg, Daily  losartan (COZAAR) tablet 50 mg, Daily  morphine (MS CONTIN) extended release tablet 15 mg, Daily  pantoprazole (PROTONIX) tablet 40 mg, Daily  Roflumilast (DALIRESP) tablet 500 mcg, Nightly  torsemide (DEMADEX) tablet 40 mg, Daily  heparin 25,000 units in dextrose 5% 250 mL (premix) infusion, Continuous  heparin (porcine) injection 2,000 Units, PRN  heparin (porcine) injection 4,000 Units, PRN  cefTRIAXone (ROCEPHIN) 1000 mg IVPB in 50 mL D5W minibag, Q24H  sodium chloride flush 0.9 % injection 5-40 mL, 2 times per day  sodium chloride flush 0.9 % injection 5-40 mL, PRN  0.9 % sodium chloride infusion, PRN  promethazine (PHENERGAN) tablet 12.5 mg, Q6H PRN    Or  ondansetron (ZOFRAN) injection 4 mg, Q6H PRN  acetaminophen (TYLENOL) tablet 650 mg, Q6H PRN    Or  acetaminophen (TYLENOL) suppository 650 mg, Q6H PRN  polyethylene glycol (GLYCOLAX) packet 17 g, Daily PRN  perflutren lipid microspheres (DEFINITY) injection 1.65 mg, ONCE PRN  0.9 % sodium chloride infusion, Continuous  nitroGLYCERIN (NITROSTAT) SL tablet 0.4 mg, Q5 Min PRN  glucose (GLUTOSE) 40 % oral gel 15 g, PRN  dextrose 50 % IV solution, PRN  glucagon (rDNA) injection 1 mg, PRN  insulin lispro (HUMALOG) injection vial 0-18 Units, TID WC  insulin lispro (HUMALOG) injection vial 0-9 Units, Nightly        Allergies:  Atenolol     Review of Systems:   A 14 point review of symptoms completed. Pertinent positives identified in the HPI, all other review of symptoms negative as below.       Objective   PHYSICAL EXAM:    Vitals:    04/24/21 0730   BP: (!) 116/49   Pulse: 79   Resp: 16   Temp: 98.5 °F (36.9 °C)   SpO2: 95%    Weight: 164 lb 1.6 oz (74.4 kg)         General Appearance:  Alert, cooperative, no distress, appears stated age   Head:  Normocephalic, without obvious abnormality, atraumatic   Eyes:  PERRL, conjunctiva/corneas clear   Nose: Nares normal, no drainage or sinus tenderness   Throat: Lips, mucosa, and tongue normal   Neck: Supple, symmetrical, trachea midline, no adenopathy, thyroid: not enlarged, symmetric, no tenderness/mass/nodules, no carotid bruit or JVD   Lungs:   Clear to auscultation bilaterally, respirations unlabored   Chest Wall:  No deformity or tenderness   Heart:  Regular rate and rhythm, S1, S2 normal, no murmur, rub or gallop   Abdomen:   Soft, non-tender, bowel sounds active all four quadrants,  no masses, no organomegaly   Extremities: Extremities normal, atraumatic, no cyanosis or edema   Pulses: 2+ and symmetric   Skin: Skin color, texture, turgor normal, no rashes or lesions   Pysch: Normal mood and affect   Neurologic: Normal gross motor and sensory exam.         Labs   CBC:   Lab Results   Component Value Date    WBC 5.4 04/24/2021    RBC 3.53 04/24/2021    HGB 9.8 04/24/2021    HCT 30.1 04/24/2021    MCV 85.2 04/24/2021    RDW 15.4 04/24/2021     04/24/2021     CMP:  Lab Results   Component Value Date     04/24/2021    K 3.3 04/24/2021    CL 93 04/24/2021    CO2 28 04/24/2021    BUN 15 04/24/2021    CREATININE 0.9 04/24/2021    GFRAA >60 04/24/2021    GFRAA >60 05/07/2013    AGRATIO 0.9 04/23/2021    LABGLOM >60 04/24/2021    GLUCOSE 303 04/24/2021    PROT 7.6 04/23/2021    PROT 7.1 03/21/2013    CALCIUM 8.3 04/24/2021 may consider more urgent cath    Patient Active Problem List   Diagnosis    Rheumatoid arthritis ([facility]    Psoriasis    GERD (gastroesophageal reflux disease)    Chronic normocytic anemia    Cylindrical bronchiectasis (HCC)    Tracheobronchomalacia    Immunocompromised state ([address]    CAD in native artery    Stasis edema of both lower extremities    Essential hypertension, benign    Lumbar spondylosis    Mitral valve insufficiency and aortic valve insufficiency    Mixed hyperlipidemia    Myopia of both eyes    Osteoporosis    Other chronic sinusitis    Primary open angle glaucoma (POAG) of both eyes, mild stage    Primary osteoarthritis of right hip    Type 2 diabetes mellitus with diabetic polyneuropathy, with long-term current use of insulin (HCC)    Type 2 diabetes mellitus with unspecified diabetic retinopathy without macular edema (HCC)    Uncontrolled type 2 diabetes mellitus with diabetic peripheral angiopathy without gangrene, with long-term current use of insulin ([facility])    Pressure ulcer of coccygeal region, stage 4 ([facility]    NSTEMI (non-ST elevated myocardial infarction) ([facility]           Thank you for allowing us to participate in the care of [address] [place] [address]. Please call me with any questions [phone]. Veda Venegas MD, [address] [place] [address] Cardiologist  [facility]  [phone] [facility]  [phone] [address] [place] [address]  4/24/2021 7:33 AM    I will address the patient's cardiac risk factors and adjusted pharmacologic treatment as needed. In addition, I have reinforced the need for patient directed risk factor modification. All questions and concerns were addressed to the patient/family. Alternatives to my treatment were discussed. The note was completed using EMR. Every effort was made to ensure accuracy; however, inadvertent computerized transcription errors may be present.

## 2021-04-24 NOTE — CONSULTS
Pharmacy to Manage Heparin Infusion per [facility]    Dx: NSTEMI  Pt wt = 74.4 kg. Baseline aPTT = 32.4 sec at 0453. Low Dose Heparin Infusion  Heparin 60 units/kg IVP bolus followed by Heparin infusion at 12 units/kg/hr (recommended initial max dose 1000 units./hr). Recheck aPTT in 6 hours. Goal aPTT = 49-76 seconds. Arrived from [facility] on heparin gtt at 8.5 mL/hr.    4/24/2021  Recent Labs     04/24/21  0453   APTT 32.4     Will give bolus of 4000 units and increase heparin gtt to 11.5 mL/hr. Recheck aPTT in 6 hours. Edelmira Lawrence, PharmD  4/24/2021 6:14 AM    4/24 1210  aPTT = 48.1 sec  Heparin 2000 units bolus and increase drip rate to 13.0 ml/hr  Recheck aPTT in 6 hours at [facility] PharmD  4/24/2021 at 1:19 PM    4/25/2021  Recent Labs     04/25/21  0007   APTT 62.4*     Continue heparin gtt at 13 mL/hr. Check aPTT daily. Edelmira Lawrence, PharmD  4/25/2021 12:36 AM    4/25/2021  Recent Labs     04/25/21  0536   APTT 57.5*     Continue heparin gtt at 13 mL/hr. Continue to check aPTT daily. Edelmira Lawrence, PharmD  4/25/2021 6:28 AM    4/26 0611  aPTT - 55.2 sec  Continue heparin drip at 13 mL/hr  Next aPTT 4/27  Lucía Bach PharmD 4/26/2021 7:58 AM    Patient finish with cath  Per RN, MD ok to restart heparin  Recommended to restart at 13 ml/hr  Recheck level in 6 hours at [address]  4/26/2021 at 4:54 PM    4/26/2021  Recent Labs     04/26/21  2253   APTT 53.4*     Continue heparin gtt at 13 mL/hr. Recheck aPTT in 6 hours. Edelmira Lawrence, PharmD  4/26/2021 11:32 PM    4/27/2021  Recent Labs     04/27/21  0527   APTT 66.9*     Continue heparin gtt at 13 mL/hr. Continue to check aPTT daily.   Edelmira Lawrence, PharmD  4/27/2021 6:24 AM    4/28 0555  aPTT - 95.2 sec  Hold heparin drip for 1 hour, then restart at 10 mL/hr  Next aPTT at [employer], PharmD 4/28/2021 8:31 AM    4/28  APTT = 48.4 sec  - will omit bolus due to almost therapeutic aPTT ( 49-74). - increase infusion rate to 11.5ml/hr per protocol  - next aPTT at 23:00. Sydnie Perez/Yfn. 4/28/21 4:39 PM EDT    4/28 2254  aptt = 53.7 sec  Continue current rate. Next aptt 0600  Tiffany Quinn.4/29/2021 12:04 AM    4/29 0455  aptt = 52 sec  continjue current rate. Next aptt daily. Tiffany Quinn.4/29/2021 7:29 AM     4/30 0534  aptt = 48.8 sec  Continue current rate. Next aptt daily  Tiffany Quinn.4/30/2021 7:05 AM    5/1 0527  aptt = 59.2 sec  Continue current rate. Next aptt daily  Tiffany Quinn.5/1/2021 6:36 AM    5/2 0524  aptt = 58.3 sec  Continue current rate. Next aptt daily  Tiffany Quinn.5/2/2021 6:43 AM    5/3 0328  aptt = 75.8 sec  Continue current rate.   Next aptt daily  Tiffany Quinn.5/3/2021 6:13 AM

## 2021-04-25 LAB
ANION GAP SERPL CALCULATED.3IONS-SCNC: 12 MMOL/L (ref 3–16)
APTT: 57.5 SEC (ref 24.2–36.2)
APTT: 62.4 SEC (ref 24.2–36.2)
BUN BLDV-MCNC: 13 MG/DL (ref 7–20)
CALCIUM SERPL-MCNC: 8.5 MG/DL (ref 8.3–10.6)
CHLORIDE BLD-SCNC: 93 MMOL/L (ref 99–110)
CO2: 27 MMOL/L (ref 21–32)
CREAT SERPL-MCNC: 0.9 MG/DL (ref 0.6–1.2)
EKG ATRIAL RATE: 85 BPM
EKG DIAGNOSIS: NORMAL
EKG P AXIS: 46 DEGREES
EKG P-R INTERVAL: 166 MS
EKG Q-T INTERVAL: 402 MS
EKG QRS DURATION: 100 MS
EKG QTC CALCULATION (BAZETT): 478 MS
EKG R AXIS: -24 DEGREES
EKG T AXIS: 41 DEGREES
EKG VENTRICULAR RATE: 85 BPM
ESTIMATED AVERAGE GLUCOSE: 177.2 MG/DL
GFR AFRICAN AMERICAN: >60
GFR NON-AFRICAN AMERICAN: >60
GLUCOSE BLD-MCNC: 143 MG/DL (ref 70–99)
GLUCOSE BLD-MCNC: 225 MG/DL (ref 70–99)
GLUCOSE BLD-MCNC: 252 MG/DL (ref 70–99)
GLUCOSE BLD-MCNC: 271 MG/DL (ref 70–99)
GLUCOSE BLD-MCNC: 297 MG/DL (ref 70–99)
GLUCOSE BLD-MCNC: 331 MG/DL (ref 70–99)
GLUCOSE BLD-MCNC: 68 MG/DL (ref 70–99)
HBA1C MFR BLD: 7.8 %
HCT VFR BLD CALC: 31.8 % (ref 36–48)
HEMOGLOBIN: 10.4 G/DL (ref 12–16)
MAGNESIUM: 1.7 MG/DL (ref 1.8–2.4)
MCH RBC QN AUTO: 27.5 PG (ref 26–34)
MCHC RBC AUTO-ENTMCNC: 32.8 G/DL (ref 31–36)
MCV RBC AUTO: 83.9 FL (ref 80–100)
PDW BLD-RTO: 15.6 % (ref 12.4–15.4)
PERFORMED ON: ABNORMAL
PLATELET # BLD: 244 K/UL (ref 135–450)
PMV BLD AUTO: 8 FL (ref 5–10.5)
POTASSIUM REFLEX MAGNESIUM: 2.7 MMOL/L (ref 3.5–5.1)
RBC # BLD: 3.79 M/UL (ref 4–5.2)
SODIUM BLD-SCNC: 132 MMOL/L (ref 136–145)
WBC # BLD: 4.3 K/UL (ref 4–11)

## 2021-04-25 PROCEDURE — 2580000003 HC RX 258: Performed by: INTERNAL MEDICINE

## 2021-04-25 PROCEDURE — 6360000002 HC RX W HCPCS: Performed by: NURSE PRACTITIONER

## 2021-04-25 PROCEDURE — 99233 SBSQ HOSP IP/OBS HIGH 50: CPT | Performed by: INTERNAL MEDICINE

## 2021-04-25 PROCEDURE — 85027 COMPLETE CBC AUTOMATED: CPT

## 2021-04-25 PROCEDURE — 6370000000 HC RX 637 (ALT 250 FOR IP): Performed by: INTERNAL MEDICINE

## 2021-04-25 PROCEDURE — 2580000003 HC RX 258: Performed by: NURSE PRACTITIONER

## 2021-04-25 PROCEDURE — 93010 ELECTROCARDIOGRAM REPORT: CPT | Performed by: INTERNAL MEDICINE

## 2021-04-25 PROCEDURE — 83735 ASSAY OF MAGNESIUM: CPT

## 2021-04-25 PROCEDURE — 6370000000 HC RX 637 (ALT 250 FOR IP): Performed by: NURSE PRACTITIONER

## 2021-04-25 PROCEDURE — 85730 THROMBOPLASTIN TIME PARTIAL: CPT

## 2021-04-25 PROCEDURE — 93005 ELECTROCARDIOGRAM TRACING: CPT | Performed by: NURSE PRACTITIONER

## 2021-04-25 PROCEDURE — 36415 COLL VENOUS BLD VENIPUNCTURE: CPT

## 2021-04-25 PROCEDURE — 6360000002 HC RX W HCPCS: Performed by: INTERNAL MEDICINE

## 2021-04-25 PROCEDURE — 1200000000 HC SEMI PRIVATE

## 2021-04-25 PROCEDURE — 80048 BASIC METABOLIC PNL TOTAL CA: CPT

## 2021-04-25 RX ORDER — POTASSIUM CHLORIDE 20 MEQ/1
40 TABLET, EXTENDED RELEASE ORAL EVERY 4 HOURS
Status: COMPLETED | OUTPATIENT
Start: 2021-04-25 | End: 2021-04-25

## 2021-04-25 RX ORDER — MAGNESIUM SULFATE IN WATER 40 MG/ML
2000 INJECTION, SOLUTION INTRAVENOUS ONCE
Status: COMPLETED | OUTPATIENT
Start: 2021-04-25 | End: 2021-04-25

## 2021-04-25 RX ORDER — GABAPENTIN 300 MG/1
300 CAPSULE ORAL 2 TIMES DAILY
Status: DISCONTINUED | OUTPATIENT
Start: 2021-04-25 | End: 2021-05-03

## 2021-04-25 RX ORDER — MORPHINE SULFATE 15 MG/1
15 TABLET, FILM COATED, EXTENDED RELEASE ORAL EVERY 12 HOURS SCHEDULED
Status: DISCONTINUED | OUTPATIENT
Start: 2021-04-25 | End: 2021-05-03

## 2021-04-25 RX ORDER — OXYCODONE AND ACETAMINOPHEN 10; 325 MG/1; MG/1
1 TABLET ORAL 2 TIMES DAILY PRN
Status: DISCONTINUED | OUTPATIENT
Start: 2021-04-25 | End: 2021-05-03

## 2021-04-25 RX ADMIN — ACETAMINOPHEN 650 MG: 325 TABLET ORAL at 00:16

## 2021-04-25 RX ADMIN — POTASSIUM CHLORIDE 40 MEQ: 1500 TABLET, EXTENDED RELEASE ORAL at 15:17

## 2021-04-25 RX ADMIN — INSULIN LISPRO 6 UNITS: 100 INJECTION, SOLUTION INTRAVENOUS; SUBCUTANEOUS at 12:50

## 2021-04-25 RX ADMIN — POTASSIUM CHLORIDE 40 MEQ: 1500 TABLET, EXTENDED RELEASE ORAL at 11:58

## 2021-04-25 RX ADMIN — POTASSIUM CHLORIDE 40 MEQ: 1500 TABLET, EXTENDED RELEASE ORAL at 20:23

## 2021-04-25 RX ADMIN — TORSEMIDE 40 MG: 20 TABLET ORAL at 08:29

## 2021-04-25 RX ADMIN — ROFLUMILAST 500 MCG: 500 TABLET ORAL at 20:23

## 2021-04-25 RX ADMIN — GABAPENTIN 300 MG: 300 CAPSULE ORAL at 20:23

## 2021-04-25 RX ADMIN — MAGNESIUM SULFATE HEPTAHYDRATE 2000 MG: 40 INJECTION, SOLUTION INTRAVENOUS at 11:42

## 2021-04-25 RX ADMIN — DULOXETINE HYDROCHLORIDE 60 MG: 60 CAPSULE, DELAYED RELEASE ORAL at 08:29

## 2021-04-25 RX ADMIN — CYCLOBENZAPRINE 10 MG: 10 TABLET, FILM COATED ORAL at 04:38

## 2021-04-25 RX ADMIN — TICAGRELOR 90 MG: 90 TABLET ORAL at 20:23

## 2021-04-25 RX ADMIN — GABAPENTIN 300 MG: 300 CAPSULE ORAL at 09:30

## 2021-04-25 RX ADMIN — ASPIRIN 81 MG: 81 TABLET, COATED ORAL at 08:29

## 2021-04-25 RX ADMIN — SODIUM CHLORIDE, PRESERVATIVE FREE 10 ML: 5 INJECTION INTRAVENOUS at 10:13

## 2021-04-25 RX ADMIN — INSULIN GLARGINE 30 UNITS: 100 INJECTION, SOLUTION SUBCUTANEOUS at 00:16

## 2021-04-25 RX ADMIN — CEFTRIAXONE SODIUM 1000 MG: 1 INJECTION, POWDER, FOR SOLUTION INTRAMUSCULAR; INTRAVENOUS at 20:23

## 2021-04-25 RX ADMIN — PANTOPRAZOLE SODIUM 40 MG: 40 TABLET, DELAYED RELEASE ORAL at 08:29

## 2021-04-25 RX ADMIN — TICAGRELOR 90 MG: 90 TABLET ORAL at 08:29

## 2021-04-25 RX ADMIN — CYCLOBENZAPRINE 10 MG: 10 TABLET, FILM COATED ORAL at 20:23

## 2021-04-25 RX ADMIN — ATORVASTATIN CALCIUM 40 MG: 40 TABLET, FILM COATED ORAL at 20:23

## 2021-04-25 RX ADMIN — INSULIN GLARGINE 30 UNITS: 100 INJECTION, SOLUTION SUBCUTANEOUS at 20:27

## 2021-04-25 RX ADMIN — INSULIN LISPRO 9 UNITS: 100 INJECTION, SOLUTION INTRAVENOUS; SUBCUTANEOUS at 09:25

## 2021-04-25 RX ADMIN — MORPHINE SULFATE 15 MG: 15 TABLET, FILM COATED, EXTENDED RELEASE ORAL at 08:29

## 2021-04-25 RX ADMIN — HEPARIN SODIUM 13 ML/HR: 10000 INJECTION, SOLUTION INTRAVENOUS at 20:22

## 2021-04-25 RX ADMIN — SODIUM CHLORIDE, PRESERVATIVE FREE 10 ML: 5 INJECTION INTRAVENOUS at 20:24

## 2021-04-25 RX ADMIN — MORPHINE SULFATE 15 MG: 15 TABLET, FILM COATED, EXTENDED RELEASE ORAL at 20:22

## 2021-04-25 RX ADMIN — OXYCODONE AND ACETAMINOPHEN 1 TABLET: 10; 325 TABLET ORAL at 11:58

## 2021-04-25 ASSESSMENT — PAIN DESCRIPTION - PAIN TYPE
TYPE: CHRONIC PAIN

## 2021-04-25 ASSESSMENT — PAIN SCALES - GENERAL
PAINLEVEL_OUTOF10: 9
PAINLEVEL_OUTOF10: 10
PAINLEVEL_OUTOF10: 6

## 2021-04-25 ASSESSMENT — PAIN DESCRIPTION - LOCATION
LOCATION: BACK
LOCATION: BREAST
LOCATION: BACK
LOCATION: BACK

## 2021-04-25 NOTE — PROGRESS NOTES
(DALIRESP) tablet 500 mcg, Nightly  torsemide (DEMADEX) tablet 40 mg, Daily  heparin (porcine) injection 2,000 Units, PRN  heparin (porcine) injection 4,000 Units, PRN  sodium chloride flush 0.9 % injection 5-40 mL, 2 times per day  sodium chloride flush 0.9 % injection 5-40 mL, PRN  0.9 % sodium chloride infusion, PRN  promethazine (PHENERGAN) tablet 12.5 mg, Q6H PRN    Or  ondansetron (ZOFRAN) injection 4 mg, Q6H PRN  acetaminophen (TYLENOL) tablet 650 mg, Q6H PRN    Or  acetaminophen (TYLENOL) suppository 650 mg, Q6H PRN  polyethylene glycol (GLYCOLAX) packet 17 g, Daily PRN  perflutren lipid microspheres (DEFINITY) injection 1.65 mg, ONCE PRN  nitroGLYCERIN (NITROSTAT) SL tablet 0.4 mg, Q5 Min PRN  glucose (GLUTOSE) 40 % oral gel 15 g, PRN  dextrose 50 % IV solution, PRN  glucagon (rDNA) injection 1 mg, PRN  insulin lispro (HUMALOG) injection vial 0-18 Units, TID WC  insulin lispro (HUMALOG) injection vial 0-9 Units, Nightly  ticagrelor (BRILINTA) tablet 90 mg, BID  cefTRIAXone (ROCEPHIN) 1000 mg IVPB in 50 mL D5W minibag, Q24H  heparin 25,000 units in dextrose 5% 250 mL (premix) infusion, Continuous           PHYSICAL EXAM   BP (!) 137/53   Pulse 87   Temp 98.7 °F (37.1 °C) (Oral)   Resp 16   Ht 5' 10\" (1.778 m)   Wt 164 lb 1.6 oz (74.4 kg)   SpO2 95%   BMI 23.55 kg/m²    Vitals:    04/24/21 2015 04/25/21 0000 04/25/21 0430 04/25/21 0730   BP: (!) 102/54 125/64 (!) 148/75 (!) 137/53   Pulse: 76 83 81 87   Resp: 18 16 16 16   Temp: 98.1 °F (36.7 °C) 99.9 °F (37.7 °C) 98.7 °F (37.1 °C) 98.7 °F (37.1 °C)   TempSrc: Oral Oral Oral Oral   SpO2: 94% 93% 96% 95%   Weight:       Height:             Intake/Output Summary (Last 24 hours) at 4/25/2021 1041  Last data filed at 4/25/2021 0932  Gross per 24 hour   Intake 1560 ml   Output 1800 ml   Net -240 ml     Wt Readings from Last 3 Encounters:   04/24/21 164 lb 1.6 oz (74.4 kg)   04/23/21 164 lb (74.4 kg)   04/21/21 164 lb (74.4 kg)         Gen: Patient in NAD,  Lumbar spondylosis    Mitral valve insufficiency and aortic valve insufficiency    Mixed hyperlipidemia    Myopia of both eyes    Osteoporosis    Other chronic sinusitis    Primary open angle glaucoma (POAG) of both eyes, mild stage    Primary osteoarthritis of right hip    Type 2 diabetes mellitus with diabetic polyneuropathy, with long-term current use of insulin (HCC)    Type 2 diabetes mellitus with unspecified diabetic retinopathy without macular edema (HCC)    Uncontrolled type 2 diabetes mellitus with diabetic peripheral angiopathy without gangrene, with long-term current use of insulin (HCC)    Pressure ulcer of coccygeal region, stage 4 ([address]    NSTEMI (non-ST elevated myocardial infarction) ([address]         Thank you for allowing me to participate in the care of your patient. Please call me with any questions [phone].       Polina Churchill MD, [address] [facility] Cardiologist  [place] [address]  [phone] [facility]  [phone] [address] [place] [address]  4/25/2021 10:41 AM

## 2021-04-25 NOTE — PLAN OF CARE
Problem: Falls - Risk of:  Goal: Will remain free from falls  Description: Will remain free from falls  Outcome: Ongoing     Problem: Skin Integrity:  Goal: Will show no infection signs and symptoms  Description: Will show no infection signs and symptoms  Outcome: Ongoing     Problem: Pain:  Goal: Control of chronic pain  Description: Control of chronic pain  Outcome: Ongoing

## 2021-04-25 NOTE — PROGRESS NOTES
dextrose, glucagon (rDNA)      Intake/Output Summary (Last 24 hours) at 4/25/2021 1317  Last data filed at 4/25/2021 0932  Gross per 24 hour   Intake 1200 ml   Output 1800 ml   Net -600 ml       Physical Exam Performed:  BP (!) 98/47   Pulse 86   Temp 98.1 °F (36.7 °C) (Oral)   Resp 16   Ht 5' 10\" (1.778 m)   Wt 164 lb 1.6 oz (74.4 kg)   SpO2 95%   BMI 23.55 kg/m²     General appearance: Pleasant elderly CF in no apparent distress, appears older than stated age and cooperative. HEENT:  Normal cephalic, atraumatic without obvious deformity. Pupils equal, round, and reactive to light. Extra ocular muscles intact. Conjunctivae/corneas clear. Neck: Supple, with full range of motion. No jugular venous distention. Trachea midline. Respiratory:  Normal respiratory effort. Clear to auscultation, bilaterally without Rales/Wheezes/Rhonchi. Cardiovascular:  Regular rate and rhythm with normal S1/S2 without murmurs, rubs or gallops. Abdomen: Soft, non-tender, non-distended with normal bowel sounds. Musculoskeletal:  No clubbing, cyanosis or edema bilaterally. Full range of motion without deformity. Skin: Skin color, texture, turgor normal.  No rashes or lesions. Sacral decub ulcer with wound VAC in place. Neurologic:  Neurovascularly intact without any focal sensory/motor deficits.  Cranial nerves: II-XII intact, grossly non-focal.  Psychiatric:  Alert and oriented, thought content appropriate, normal insight  Capillary Refill: Brisk,< 3 seconds   Peripheral Pulses: +2 palpable, equal bilaterally      Labs:   Recent Labs     04/23/21  1837 04/24/21  0448 04/25/21  0954   WBC 6.6 5.4 4.3   HGB 11.1* 9.8* 10.4*   HCT 33.8* 30.1* 31.8*    207 244     Recent Labs     04/23/21  1837 04/24/21  0453 04/25/21  0954   * 134* 132*   K 3.6 3.3* 2.7*   CL 89* 93* 93*   CO2 31 28 27   BUN 16 15 13   CREATININE 1.0 0.9 0.9   CALCIUM 8.8 8.3 8.5     Recent Labs     04/23/21  1837   AST 28   ALT 8*   BILITOT 0.6 ALKPHOS 144*     No results for input(s): INR in the last 72 hours. Recent Labs     04/23/21  1837 04/24/21  0448 04/24/21  0759   TROPONINI 0.59* 0.58* 0.57*       Urinalysis:      Lab Results   Component Value Date    NITRU Negative 04/23/2021    WBCUA 0-2 04/23/2021    BACTERIA 1+ 04/23/2021    RBCUA 5-10 04/23/2021    BLOODU SMALL 04/23/2021    SPECGRAV 1.010 04/23/2021    GLUCOSEU 100 04/23/2021    GLUCOSEU NEGATIVE 03/20/2010     Active Hospital Problems    Diagnosis Date Noted    NSTEMI (non-ST elevated myocardial infarction) ([facility] [I21.4] 04/23/2021    Hypokalemia [E87.6]     Anemia [D64.9] 10/19/2017    Coronary artery disease [I25.10] 07/03/2017     Assessment/Plan:  1. NSTEMI - POA   -Admitted to  [facility]/telemetry; EKG abnormal with ST-T changes in lateral leads. Troponin  0.58 on presentation being trended.  -On heparin GTT. Cardiology consulted with plans [facility]atively. -Echo on 4/24/2021 showed normal LVEF 55%; no R WMA; mild concentric LVH  -Continue aspirin, Lipitor, beta-blocker, ARB. Sublingual nitro and morphine as needed for pain      2. Hypertension -optimal control on home medication; continue     3. Type 2 diabetes mellitus -on Lantus 30 units nightly; continue and  SSI per BGM. -HbA1c 7.8% on 4/24/2021.     4. Chronic back pain with  Opioid dependence -on MS Contin 15 mg twice daily and OxyIR 10 mg twice daily. Resumed MS Contin and OxyIR as as needed. 5.  Exertional shortness of breath on admission likely due to chronic COPD without exacerbation  -CTPA negative for PE but showed diffuse emphysematous changes in bilateral lower lungs. Likely presentation related to NSTEMI. Continue current care. -HHN as needed  -Elevated pro calcitonin (0.75) on admission. Started on empiric Rocephin. Will follow blood and sputum cultures.     6. Ex-smoker  -reports that she quit few years ago.      7.  Severe hypokalemia -noted on 4/25/2021 -likely due to diuretics. Replacement ordered and monitor.  -Noted cardiology requested for aldosterone level-follow.     DVT Prophylaxis: Heparin gtt   Diet: DIET CARDIAC; Carb Control: 4 carb choices (60 gms)/meal  Diet NPO, After Midnight  Code Status: Full Code    PT/OT Eval Status: Will need     Dispo -likely 1 to 2 days pending [facility] tomorrow; cath findings and clinical course     The note was completed using Dragon -speech recognition software & EMR  . Every effort was made to ensure accuracy; however, inadvertent computerized transcription errors may be present.     Heydi Horn MD

## 2021-04-26 LAB
ALBUMIN SERPL-MCNC: 2.6 G/DL (ref 3.4–5)
ALDOSTERONE: 12.2 NG/DL
ANION GAP SERPL CALCULATED.3IONS-SCNC: 10 MMOL/L (ref 3–16)
APTT: 53.4 SEC (ref 24.2–36.2)
APTT: 55.2 SEC (ref 24.2–36.2)
BILIRUBIN URINE: NEGATIVE
BLOOD, URINE: ABNORMAL
BUN BLDV-MCNC: 11 MG/DL (ref 7–20)
CALCIUM SERPL-MCNC: 8.6 MG/DL (ref 8.3–10.6)
CHLORIDE BLD-SCNC: 97 MMOL/L (ref 99–110)
CLARITY: CLEAR
CO2: 28 MMOL/L (ref 21–32)
COLOR: YELLOW
CREAT SERPL-MCNC: 0.9 MG/DL (ref 0.6–1.2)
CULTURE, RESPIRATORY: NORMAL
EPITHELIAL CELLS, UA: ABNORMAL /HPF (ref 0–5)
GFR AFRICAN AMERICAN: >60
GFR NON-AFRICAN AMERICAN: >60
GLUCOSE BLD-MCNC: 117 MG/DL (ref 70–99)
GLUCOSE BLD-MCNC: 123 MG/DL (ref 70–99)
GLUCOSE BLD-MCNC: 258 MG/DL (ref 70–99)
GLUCOSE BLD-MCNC: 49 MG/DL (ref 70–99)
GLUCOSE BLD-MCNC: 63 MG/DL (ref 70–99)
GLUCOSE BLD-MCNC: 78 MG/DL (ref 70–99)
GLUCOSE URINE: NEGATIVE MG/DL
GRAM STAIN RESULT: NORMAL
KETONES, URINE: NEGATIVE MG/DL
LEUKOCYTE ESTERASE, URINE: NEGATIVE
LV EF: 58 %
LV EF: 58 %
LVEF MODALITY: NORMAL
LVEF MODALITY: NORMAL
MICROSCOPIC EXAMINATION: YES
NITRITE, URINE: NEGATIVE
PERFORMED ON: ABNORMAL
PERFORMED ON: NORMAL
PH UA: 8 (ref 5–8)
PHOSPHORUS: 2.5 MG/DL (ref 2.5–4.9)
POTASSIUM SERPL-SCNC: 3.3 MMOL/L (ref 3.5–5.1)
PROTEIN UA: NEGATIVE MG/DL
RBC UA: ABNORMAL /HPF (ref 0–4)
SODIUM BLD-SCNC: 135 MMOL/L (ref 136–145)
SPECIFIC GRAVITY UA: 1.01 (ref 1–1.03)
URINE REFLEX TO CULTURE: ABNORMAL
URINE TYPE: ABNORMAL
UROBILINOGEN, URINE: 0.2 E.U./DL
WBC UA: ABNORMAL /HPF (ref 0–5)

## 2021-04-26 PROCEDURE — 93458 L HRT ARTERY/VENTRICLE ANGIO: CPT | Performed by: INTERNAL MEDICINE

## 2021-04-26 PROCEDURE — C1769 GUIDE WIRE: HCPCS

## 2021-04-26 PROCEDURE — 93458 L HRT ARTERY/VENTRICLE ANGIO: CPT

## 2021-04-26 PROCEDURE — 6360000002 HC RX W HCPCS

## 2021-04-26 PROCEDURE — 80069 RENAL FUNCTION PANEL: CPT

## 2021-04-26 PROCEDURE — 2060000000 HC ICU INTERMEDIATE R&B

## 2021-04-26 PROCEDURE — 2500000003 HC RX 250 WO HCPCS

## 2021-04-26 PROCEDURE — 6370000000 HC RX 637 (ALT 250 FOR IP): Performed by: INTERNAL MEDICINE

## 2021-04-26 PROCEDURE — 6360000004 HC RX CONTRAST MEDICATION

## 2021-04-26 PROCEDURE — 97605 NEG PRS WND THER DME<=50SQCM: CPT

## 2021-04-26 PROCEDURE — C1887 CATHETER, GUIDING: HCPCS

## 2021-04-26 PROCEDURE — 6360000002 HC RX W HCPCS: Performed by: INTERNAL MEDICINE

## 2021-04-26 PROCEDURE — 81001 URINALYSIS AUTO W/SCOPE: CPT

## 2021-04-26 PROCEDURE — 36415 COLL VENOUS BLD VENIPUNCTURE: CPT

## 2021-04-26 PROCEDURE — 99223 1ST HOSP IP/OBS HIGH 75: CPT | Performed by: NURSE PRACTITIONER

## 2021-04-26 PROCEDURE — C1894 INTRO/SHEATH, NON-LASER: HCPCS

## 2021-04-26 PROCEDURE — 2580000003 HC RX 258: Performed by: INTERNAL MEDICINE

## 2021-04-26 PROCEDURE — 6370000000 HC RX 637 (ALT 250 FOR IP): Performed by: NURSE PRACTITIONER

## 2021-04-26 PROCEDURE — 2580000003 HC RX 258: Performed by: NURSE PRACTITIONER

## 2021-04-26 PROCEDURE — 4A023N7 MEASUREMENT OF CARDIAC SAMPLING AND PRESSURE, LEFT HEART, PERCUTANEOUS APPROACH: ICD-10-PCS | Performed by: INTERNAL MEDICINE

## 2021-04-26 PROCEDURE — 6370000000 HC RX 637 (ALT 250 FOR IP)

## 2021-04-26 PROCEDURE — B2151ZZ FLUOROSCOPY OF LEFT HEART USING LOW OSMOLAR CONTRAST: ICD-10-PCS | Performed by: INTERNAL MEDICINE

## 2021-04-26 PROCEDURE — B2111ZZ FLUOROSCOPY OF MULTIPLE CORONARY ARTERIES USING LOW OSMOLAR CONTRAST: ICD-10-PCS | Performed by: INTERNAL MEDICINE

## 2021-04-26 PROCEDURE — 85730 THROMBOPLASTIN TIME PARTIAL: CPT

## 2021-04-26 PROCEDURE — 2709999900 HC NON-CHARGEABLE SUPPLY

## 2021-04-26 RX ORDER — MIDAZOLAM HYDROCHLORIDE 5 MG/ML
INJECTION INTRAMUSCULAR; INTRAVENOUS
Status: COMPLETED | OUTPATIENT
Start: 2021-04-26 | End: 2021-04-26

## 2021-04-26 RX ORDER — NICOTINE POLACRILEX 4 MG
15 LOZENGE BUCCAL PRN
Status: DISCONTINUED | OUTPATIENT
Start: 2021-04-26 | End: 2021-04-26 | Stop reason: SDUPTHER

## 2021-04-26 RX ORDER — FENTANYL CITRATE 50 UG/ML
INJECTION, SOLUTION INTRAMUSCULAR; INTRAVENOUS
Status: COMPLETED | OUTPATIENT
Start: 2021-04-26 | End: 2021-04-26

## 2021-04-26 RX ORDER — SODIUM CHLORIDE 9 MG/ML
INJECTION, SOLUTION INTRAVENOUS CONTINUOUS
Status: ACTIVE | OUTPATIENT
Start: 2021-04-26 | End: 2021-04-26

## 2021-04-26 RX ORDER — DEXTROSE MONOHYDRATE 50 MG/ML
100 INJECTION, SOLUTION INTRAVENOUS PRN
Status: DISCONTINUED | OUTPATIENT
Start: 2021-04-26 | End: 2021-05-03

## 2021-04-26 RX ORDER — HYDRALAZINE HYDROCHLORIDE 20 MG/ML
10 INJECTION INTRAMUSCULAR; INTRAVENOUS EVERY 10 MIN PRN
Status: DISCONTINUED | OUTPATIENT
Start: 2021-04-26 | End: 2021-05-03

## 2021-04-26 RX ORDER — SODIUM CHLOR/HYPOCHLOROUS ACID 0.033 %
SOLUTION, IRRIGATION IRRIGATION CONTINUOUS PRN
Status: DISCONTINUED | OUTPATIENT
Start: 2021-04-26 | End: 2021-05-06

## 2021-04-26 RX ADMIN — SODIUM CHLORIDE: 9 INJECTION, SOLUTION INTRAVENOUS at 17:22

## 2021-04-26 RX ADMIN — GABAPENTIN 300 MG: 300 CAPSULE ORAL at 20:40

## 2021-04-26 RX ADMIN — CYCLOBENZAPRINE 10 MG: 10 TABLET, FILM COATED ORAL at 03:23

## 2021-04-26 RX ADMIN — MIDAZOLAM HYDROCHLORIDE 1 MG: 5 INJECTION INTRAMUSCULAR; INTRAVENOUS at 09:58

## 2021-04-26 RX ADMIN — HEPARIN SODIUM 13 ML/HR: 10000 INJECTION, SOLUTION INTRAVENOUS at 17:25

## 2021-04-26 RX ADMIN — OXYCODONE AND ACETAMINOPHEN 1 TABLET: 10; 325 TABLET ORAL at 03:24

## 2021-04-26 RX ADMIN — INSULIN LISPRO 9 UNITS: 100 INJECTION, SOLUTION INTRAVENOUS; SUBCUTANEOUS at 17:32

## 2021-04-26 RX ADMIN — ROFLUMILAST 500 MCG: 500 TABLET ORAL at 23:07

## 2021-04-26 RX ADMIN — CYCLOBENZAPRINE 10 MG: 10 TABLET, FILM COATED ORAL at 20:40

## 2021-04-26 RX ADMIN — MIDAZOLAM HYDROCHLORIDE 1 MG: 5 INJECTION INTRAMUSCULAR; INTRAVENOUS at 10:01

## 2021-04-26 RX ADMIN — OXYCODONE AND ACETAMINOPHEN 1 TABLET: 10; 325 TABLET ORAL at 14:56

## 2021-04-26 RX ADMIN — TICAGRELOR 90 MG: 90 TABLET ORAL at 08:02

## 2021-04-26 RX ADMIN — METOPROLOL SUCCINATE 25 MG: 25 TABLET, EXTENDED RELEASE ORAL at 07:59

## 2021-04-26 RX ADMIN — DEXTROSE MONOHYDRATE 100 ML/HR: 50 INJECTION, SOLUTION INTRAVENOUS at 04:59

## 2021-04-26 RX ADMIN — DULOXETINE HYDROCHLORIDE 60 MG: 60 CAPSULE, DELAYED RELEASE ORAL at 09:25

## 2021-04-26 RX ADMIN — ATORVASTATIN CALCIUM 40 MG: 40 TABLET, FILM COATED ORAL at 20:40

## 2021-04-26 RX ADMIN — DEXTROSE MONOHYDRATE 12.5 G: 25 INJECTION, SOLUTION INTRAVENOUS at 04:53

## 2021-04-26 RX ADMIN — FENTANYL CITRATE 50 MCG: 50 INJECTION, SOLUTION INTRAMUSCULAR; INTRAVENOUS at 09:58

## 2021-04-26 RX ADMIN — MORPHINE SULFATE 15 MG: 15 TABLET, FILM COATED, EXTENDED RELEASE ORAL at 20:40

## 2021-04-26 RX ADMIN — CEFTRIAXONE SODIUM 1000 MG: 1 INJECTION, POWDER, FOR SOLUTION INTRAMUSCULAR; INTRAVENOUS at 20:40

## 2021-04-26 RX ADMIN — ASPIRIN 81 MG: 81 TABLET, COATED ORAL at 09:25

## 2021-04-26 ASSESSMENT — PAIN SCALES - GENERAL
PAINLEVEL_OUTOF10: 0
PAINLEVEL_OUTOF10: 7
PAINLEVEL_OUTOF10: 0
PAINLEVEL_OUTOF10: 0

## 2021-04-26 NOTE — CONSULTS
Department of Cardiovascular & Thoracic Surgery  History and Physical          DIAGNOSIS: Multivessel CAD/NSTEMI    CHIEF COMPLAINT: syncopal episode/hypotension/CP     History Obtained From:  patient, electronic medical record    HISTORY OF PRESENT ILLNESS:      The patient is a 72 y.o. female with significant past medical history of COPD, bronchiectasis, tracheomalacia, osteoporosis (multiple fractures), GERD, DANIELLE (not using CPAP regularly), RA,  chronic back pain, currently being treated at [facility] for stage 4 sacral pressure ulcer (treated by Dr. Timothy Griffith at [facility]) and T2DM who presented to [facility] ED after a syncopal episode. During her work up in the ED she was found to have elevated troponin levels (0.59) and her EKG showed lateral depression (NSTEMI). She was transferred to [facility] for further evaluation and treatment as well as a Cardiology consult. Her cardiac catheterization today revealed multivessel CAD. We have been consulted for surgical revascularization. Past Medical History:        Diagnosis Date    Atherosclerosis of native artery of right lower extremity with rest pain ([address] 07/25/2017    Back pain     Branch retinal vein occlusion 07/20/2012    Bronchiectasis with acute exacerbation (HCC)     Closed compression fracture of thoracic vertebra ([address] 01/15/2020    Closed fracture of facial bone with routine healing 11/21/2016    Closed jaw fracture ([address] 01/15/2020    Community acquired pneumonia of left lower lobe of lung     Compression fracture of L1 lumbar vertebra ([address] 01/15/2020    Fracture of tibial plateau, closed, left, initial encounter 12/05/2017    Minimally displaced zone I fracture of sacrum (HCC) 09/02/2020    Mucus plugging of bronchi     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 Bone density test last year showed severe osteoporosis. Recently, two broken vertebrae (L1, L2) due to coughing. Diagnosed with tracheomalacia and stated she must cough very hard to clear phlegm. Was coughing due to upper respiratory infections which have been treated. Hx of laminectomy and recent kyphoplasty. Refractured her jawbone which was previously repaired wi    Post herpetic neuralgia     Proximal humerus fracture 10/01/2019    Shingles 05/2020    Sleep apnea     Temporal arteritis ([facility] 07/10/2013    Tobacco use 10/19/2017    Vitreous hemorrhage, right eye ([address] 02/21/2020       Past Surgical History:        Procedure Laterality Date    ARTERY BIOPSY Right 03/01/2021    at [address]  5/30/13    left temporal artery biopsy    BACK SURGERY  08/2020    BRONCHOSCOPY      BRONCHOSCOPY N/A 6/12/2019    BRONCHOSCOPY ALVEOLAR LAVAGE performed by Paris Ybarra MD at [facility]. [facility]      FOOT SURGERY      HERNIA REPAIR      KNEE ARTHROSCOPY      left    KYPHOSIS SURGERY      LAMINECTOMY      MANDIBLE FRACTURE SURGERY      MANDIBLE FRACTURE SURGERY  02/2020    OTHER SURGICAL HISTORY  01/08/2021    sacral wound debridement    PRESSURE ULCER DEBRIDEMENT N/A 1/8/2021    SACRAL WOUND DEBRIDEMENT performed by Doug Dockery MD at  [facility]  5/7/2013    FESS with balloon    SPINAL FUSION      TUBAL LIGATION      UPPER GASTROINTESTINAL ENDOSCOPY  4/8/2014    Dilitation       Medications Prior to Admission:   Medications Prior to Admission: oxyCODONE-acetaminophen (PERCOCET)  MG per tablet, Take 1 tablet by mouth 2 times daily. metroNIDAZOLE (FLAGYL) 250 MG tablet, Crush one tablet into a fine powder, sprinkle in wound three times per week with dressing changes, as needed for malodor.   docusate sodium (COLACE) 100 MG capsule, Take 100 mg by mouth 2 times daily as needed for Constipation  DULoxetine (CYMBALTA) 60 MG extended release capsule, Take 60 mg by MCV 83.9 04/25/2021    MCH 27.5 04/25/2021    MCHC 32.8 04/25/2021    RDW 15.6 04/25/2021    SEGSPCT 66.1 03/21/2013    LYMPHOPCT 21.1 04/23/2021    MONOPCT 5.3 04/23/2021    EOSPCT 0.2 11/19/2011    BASOPCT 1.0 04/23/2021    MONOSABS 0.4 04/23/2021    LYMPHSABS 1.4 04/23/2021    EOSABS 0.1 04/23/2021    BASOSABS 0.1 04/23/2021    DIFFTYPE Auto-K 03/21/2013     CMP:    Lab Results   Component Value Date     04/26/2021    K 3.3 04/26/2021    K 2.7 04/25/2021    CL 97 04/26/2021    CO2 28 04/26/2021    BUN 11 04/26/2021    CREATININE 0.9 04/26/2021    GFRAA >60 04/26/2021    GFRAA >60 05/07/2013    AGRATIO 0.9 04/23/2021    LABGLOM >60 04/26/2021    GLUCOSE 63 04/26/2021    PROT 7.6 04/23/2021    PROT 7.1 03/21/2013    LABALBU 2.6 04/26/2021    CALCIUM 8.6 04/26/2021    BILITOT 0.6 04/23/2021    ALKPHOS 144 04/23/2021    AST 28 04/23/2021    ALT 8 04/23/2021     Hepatic Function Panel:    Lab Results   Component Value Date    ALKPHOS 144 04/23/2021    ALT 8 04/23/2021    AST 28 04/23/2021    PROT 7.6 04/23/2021    PROT 7.1 03/21/2013    BILITOT 0.6 04/23/2021    BILIDIR <0.2 04/12/2017    IBILI see below 04/12/2017    LABALBU 2.6 04/26/2021     Magnesium:    Lab Results   Component Value Date    MG 1.70 04/25/2021     Phosphorus:    Lab Results   Component Value Date    PHOS 2.5 04/26/2021     Last 3 Troponin:    Lab Results   Component Value Date    TROPONINI 0.57 04/24/2021    TROPONINI 0.58 04/24/2021    TROPONINI 0.59 04/23/2021       CXR: 4/23/21   Impression   Bilateral parahilar linear opacities could represent pulmonary edema or   infection       ECHO: 4/24/21 with Dr. Susana Lennon    Summary:    Normal left ventricle systolic function with an estimated ejection fraction of 55%. No regional wall motion abnormalities are seen. Normal left ventricular diastolic filling pressure. Mild eccentric aortic regurgitation. Mild mitral and tricuspid regurgitation.    Systolic pulmonary artery pressure (SPAP) is normal and estimated at 27 mmHg (right atrial pressure 3 mmHg). CTA Pulmonary w/ contrast: 4/23/21      FINDINGS:   Pulmonary Arteries: Pulmonary arteries are adequately opacified for   evaluation.  No evidence of intraluminal filling defect to suggest pulmonary   embolism.  Main pulmonary artery is normal in caliber.       Mediastinum: No evidence of mediastinal lymphadenopathy.  The heart and   pericardium demonstrate no acute abnormality.  There is no acute abnormality   of the thoracic aorta.       Lungs/pleura: Diffuse emphysematous changes including centrilobular and   paraseptal.  Mild patchy opacities are noted in the bilateral lower lungs   which could indicate an inflammatory/post inflammatory process.  Small area   of consolidation in the left perihilar region which may be concerning for   underlying airspace disease process.  No pleural effusion.       Upper Abdomen: Limited images of the upper abdomen are unremarkable.       Soft Tissues/Bones: Chronic compression fractures with evidence of prior   vertebroplasty involving the T12 and L1 vertebral bodies.  Otherwise no   evidence of an acute process in the soft tissues or skeletal structures.           LHC: 4/26/21 with Dr. Terry Hernandez   Findings:  1. Left main coronary artery distal 70%. It gave off the left anterior descending artery and left circumflex.     2. Left anterior descending artery has severe atherosclerotic disease. 70% ostial. It was moderate in size. It gave off septal perforators and a moderate sized diagonal branch. The LAD covered the entire apex of the left ventricle.      3. Left circumflex has severe atherosclerotic disease. 99% ostial. It was moderate in size. There was a moderate sized obtuse marginal branch. ~OM1      4. Right coronary artery has severe atherosclerotic disease. It was moderate in size and was the dominant artery.               ~100% distal PDA      5. Left ventriculogram showed normal LVEF at surgical revascularization. Clinically she's deconditioned with albumin of 2.6 (malnutrition), and sacral decubitus. CTS will continue to assess her surgical candidacy. My partners Dr. Arnaud Flower to see her. Thank you for this consultation.      Carmine Marin MD  Cardiothoracic Surgery

## 2021-04-26 NOTE — PROGRESS NOTES
[facility] or Facility: PAMELA From: Mercedes Celaya RE: Rani  1950 RM: [id number] Her K level today is 3.3 has not been replaced yet, do you want to order her 40 meq po? thank you Need Callback: NO CALLBACK REQ C4 PROGRESSIVE CARERead 6:07 PM      Sent to Dr Soha Fajardo via perfect serve.

## 2021-04-26 NOTE — PROGRESS NOTES
[facility] Progress Note    Kiera Nelson     : 1950    DATE OF VISIT:  2021    Subjective:     Kiera Nelson is a 72 y.o. female who has a pressure ulcer located on the sacrum. Significant symptoms or pertinent wound history since last visit: feeling ok overall, some intermittent wound pain, no fever. Did have a gentle fall in the garage over the weekend, when she felt lightheaded; BP was reportedly in the 80s at the time, but she did not lose consciousness, felt back to herself pretty quickly, denies any history of anything that would predict volume depletion; her glucose was normal at the time. NPWT working well. Additional ulcer(s) noted? no.      Her current medication list consists of Acapella, DULoxetine, Insulin Syringe-Needle U-100, Roflumilast, Teriparatide (Recombinant), albuterol sulfate HFA, aspirin EC, atorvastatin, azithromycin, blood glucose test strips, calcium carbonate, cetirizine, cyclobenzaprine, docusate sodium, etanercept, fluticasone, gabapentin, insulin glargine, insulin lispro, ipratropium, latanoprost, losartan, meloxicam, metoprolol succinate, metroNIDAZOLE, morphine, naloxone, omeprazole, oxyCODONE-acetaminophen, polyethylene glycol, torsemide, and vitamin D. Allergies: Atenolol    Objective:     Vitals:    21 0915   BP: 127/64   Pulse: 81   Resp: 18   Temp: 98.1 °F (36.7 °C)   TempSrc: Oral   Weight: 164 lb (74.4 kg)   Height: 5' 10\" (1.778 m)     AAOx3, fatigued, NAD  No cellulitis, angitis, fluctuance  No regional acute arthritis or bursitis  No cutaneous Candidiasis, mild contact dermatitis from NPWT drape  Blanca-ulcer skin: indurated, pink, warm and dry. Ulcer(s): slowly smaller, mostly pink-red and granular, small central area of fibrosis, some fibrin and biofilm, serosanguinous exudate, slowly improving undermining, no signs of acute infection, no bone exposure. Photos also saved in electronic chart.     Today's wound measurements, per RN documentation:  Wound 12/18/20 #2, Sacrum, Pressure Injury, Stage 4, Onset 5/2020-Wound Length (cm): 3.2 cm    Wound 12/18/20 #2, Sacrum, Pressure Injury, Stage 4, Onset 5/2020-Wound Width (cm): 1 cm    Wound 12/18/20 #2, Sacrum, Pressure Injury, Stage 4, Onset 5/2020-Wound Depth (cm): 1.7 cm    Assessment:     Patient Active Problem List   Diagnosis Code    Rheumatoid arthritis ([facility] M06.9    Psoriasis L40.9    GERD (gastroesophageal reflux disease) K21.9    Anemia D64.9    Cylindrical bronchiectasis ([facility]) J47.9    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    Immunocompromised state ([facility] D84.9    Hypokalemia E87.6    Coronary artery disease I25.10    Stasis edema of both lower extremities I87.303    Essential hypertension, benign I10    Lumbar spondylosis M47.816    Mitral valve insufficiency and aortic valve insufficiency I08.0    Mixed hyperlipidemia E78.2    Myopia of both eyes H52.13    Osteoporosis M81.0    Other chronic sinusitis J32.8    Primary open angle glaucoma (POAG) of both eyes, mild stage H40.1131    Primary osteoarthritis of right hip M16.11    Type 2 diabetes mellitus with diabetic polyneuropathy, with long-term current use of insulin ([facility]) E11.42, Z79.4    Type 2 diabetes mellitus with unspecified diabetic retinopathy without macular edema ([facility]) E11.319    Uncontrolled type 2 diabetes mellitus with diabetic peripheral angiopathy without gangrene, with long-term current use of insulin ([facility]) E11.51, E11.65, Z79.4    Pressure ulcer of coccygeal region, stage 4 ([facility]) L89.154       Assessment of today's active condition(s): RA, slowly healing stage 4 sacral pressure ulcer, no signs of infection. Factors contributing to occurrence and/or persistence of the chronic ulcer include chronic pressure, decreased mobility, shear force and immunosuppression. Medical necessity of today's visit is shown by the above documentation.  Sharp debridement is indicated today, based upon the exam findings in the wound(s) above. Procedure note:     Consent obtained. Time out performed per [facility]. Anesthetic  Anesthetic: 4% Lidocaine Cream     Using a curette, I sharply debrided the sacrum ulcer(s) down through and including the removal of dermis. The type(s) of tissue debrided included fibrin, biofilm and exudate. Total Surface Area Debrided: 3 sq cm. The ulcers were then irrigated with normal saline solution. The procedure was completed with a small amount of bleeding, and hemostasis was with pressure. The patient tolerated the procedure well, with no significant complications. The patient's level of pain during and after the procedure was monitored. Post-debridement measurements, if different from pre-debridement, are in the flowsheet as well. Discharge plan:     Treatment in the wound care center today, per RN documentation: Wound 12/18/20 #2, Sacrum, Pressure Injury, Stage 4, Onset 5/2020-Dressing/Treatment: Other (comment)(Crushed Flagyl, Collagen,NPWT dressing). Keep up good work with offloading and protein intake. ROHO cushion use when seated; at night, she sleeps lying on her sides. Will order some additional collagen dressings from a local [employer] company. She's following up with her primary physician regarding that episode of low BP over the weekend. Home treatment: good handwashing before and after any dressing changes. Cleanse wound with saline or soap & water before dressing change. May use Vaseline (petrolatum), Aquaphor, Aveeno, CeraVe, Cetaphil, Eucerin, Lubriderm, etc for dry skin. Dressing type for home: Hypochlorous acid spray, Flagyl powder, Kathia and NPWT (black foam, drape toward hip, standard drape, continuous negative pressure), three times weekly. Written discharge instructions given to patient. Follow up in 1 week.     Electronically signed by Margot Shipley MD on 4/26/2021 at 2:40 PM.

## 2021-04-26 NOTE — CONSULTS
2019    BRONCHOSCOPY ALVEOLAR LAVAGE performed by Rasta Diaz MD at [facility] [address]      FOOT SURGERY      HERNIA REPAIR      KNEE ARTHROSCOPY      left    KYPHOSIS SURGERY      LAMINECTOMY      MANDIBLE FRACTURE SURGERY      MANDIBLE FRACTURE SURGERY  2020    OTHER SURGICAL HISTORY  2021    sacral wound debridement    PRESSURE ULCER DEBRIDEMENT N/A 2021    SACRAL WOUND DEBRIDEMENT performed by Ed Brock MD at [address] SEPTOPLASTY  2013    FESS with balloon    SPINAL FUSION      TUBAL LIGATION      UPPER GASTROINTESTINAL ENDOSCOPY  2014    Dilitation       FAMILY HISTORY    Family History   Problem Relation Age of Onset    Diabetes Mother     Hypertension Mother     Asthma Other     Heart Disease Brother     Diabetes Brother     Diabetes Sister     Heart Disease Brother     Cancer Neg Hx     Emphysema Neg Hx     Heart Failure Neg Hx        SOCIAL HISTORY    Social History     Tobacco Use    Smoking status: Former Smoker     Packs/day: 1.00     Years: 20.00     Pack years: 20.00     Types: Cigarettes     Quit date: 1991     Years since quittin.0    Smokeless tobacco: Never Used   Substance Use Topics    Alcohol use: Not Currently     Alcohol/week: 0.0 standard drinks     Comment: rarely    Drug use: No       ALLERGIES    Allergies   Allergen Reactions    Atenolol      Cough         MEDICATIONS    No current facility-administered medications on file prior to encounter. Current Outpatient Medications on File Prior to Encounter   Medication Sig Dispense Refill    oxyCODONE-acetaminophen (PERCOCET)  MG per tablet Take 1 tablet by mouth 2 times daily.  metroNIDAZOLE (FLAGYL) 250 MG tablet Crush one tablet into a fine powder, sprinkle in wound three times per week with dressing changes, as needed for malodor.  30 tablet 1    docusate sodium (COLACE) 100 MG capsule Take 100 mg by mouth 2 times daily as needed for Constipation      DULoxetine (CYMBALTA) 60 MG extended release capsule Take 60 mg by mouth daily      gabapentin (NEURONTIN) 300 MG capsule Take 300 mg by mouth 2 times daily.  latanoprost (XALATAN) 0.005 % ophthalmic solution Place 1 drop into both eyes nightly      polyethylene glycol (GLYCOLAX) 17 g packet Take 17 g by mouth daily as needed for Constipation      Teriparatide, Recombinant, (FORTEO) 600 MCG/2.4ML SOPN injection Inject 20 mcg into the skin daily      torsemide (DEMADEX) 20 MG tablet Take 40 mg by mouth daily      azithromycin (ZITHROMAX) 250 MG tablet TAKE 1 TABLET BY MOUTH EVERY DAY 30 tablet 5    morphine (MS CONTIN) 15 MG extended release tablet 15 mg 2 times daily.        ipratropium (ATROVENT) 0.06 % nasal spray USE 2 SPRAYS BY NASAL ROUTE 2-4 TIMES DAILY 1 Bottle 5    Roflumilast (DALIRESP) 500 MCG tablet Take 1 tablet by mouth daily 30 tablet 11    albuterol sulfate  (90 Base) MCG/ACT inhaler INHALE 2 PUFFS INTO THE LUNGS EVERY 4 HOURS AS NEEDED FOR WHEEZING 1 Inhaler 5    vitamin D (CHOLECALCIFEROL) 1000 UNIT TABS tablet Take 5,000 Units by mouth daily       calcium carbonate (OSCAL) 500 MG TABS tablet Take 500 mg by mouth daily      atorvastatin (LIPITOR) 40 MG tablet Take 40 mg by mouth      cyclobenzaprine (FLEXERIL) 10 MG tablet Take 10 mg by mouth 2 times daily as needed       Insulin Syringe-Needle U-100 31G X 5/16\" 0.5 ML MISC USE 5 TIMES DAILY      meloxicam (MOBIC) 15 MG tablet Patient states taking only as needed      metoprolol succinate (TOPROL XL) 25 MG extended release tablet Take 25 mg by mouth daily      insulin glargine (LANTUS) 100 UNIT/ML injection vial Inject 30 Units into the skin nightly Per pt, takes 20 or 30u nightly      aspirin EC 81 MG EC tablet Take 1 tablet by mouth daily      insulin lispro (HUMALOG) 100 UNIT/ML injection vial Inject 0-12 Units into the skin 3 times daily (with meals)      fluticasone (FLONASE) 50 MCG/ACT nasal spray INHALE 2 SPRAYS IN EACH NOSTRIL DAILY 1 Bottle 5    omeprazole (PRILOSEC) 40 MG capsule Take 40 mg by mouth daily       losartan (COZAAR) 50 MG tablet Take 50 mg by mouth daily      NARCAN 4 MG/0.1ML LIQD nasal spray PLEASE SEE ATTACHED FOR DETAILED DIRECTIONS      ACCU-CHEK CEDRICK PLUS strip TEST 4 TIMES DAILY  3    Misc. Devices (ACAPELLA) MISC Take 1 Device by mouth as needed 1 each 0    cetirizine (ZYRTEC) 10 MG tablet Take 10 mg by mouth daily.  etanercept (ENBREL) 50 MG/ML injection Inject 50 mg into the skin every 7 days. Objective  Current dressing loose and leaking. Home VAC Medella brand in place. /62   Pulse 82   Temp 97.3 °F (36.3 °C) (Oral)   Resp 16   Ht 5' 10\" (1.778 m)   Wt 160 lb (72.6 kg)   SpO2 96%   BMI 22.96 kg/m²     LABS:  WBC:    Lab Results   Component Value Date    WBC 4.3 04/25/2021     H/H:    Lab Results   Component Value Date    HGB 10.4 04/25/2021    HCT 31.8 04/25/2021     PTT:    Lab Results   Component Value Date    APTT 55.2 04/26/2021   [APTT}  PT/INR:    Lab Results   Component Value Date    PROTIME 12.5 06/28/2020    INR 1.08 06/28/2020     HgBA1c:    Lab Results   Component Value Date    LABA1C 7.8 04/24/2021       Assessment  Wound bed pale pink. Blanca wound pink with some maceration.   Undermining present from 6-12pm.   Sven Risk Score: Sven Scale Score: 17    Patient Active Problem List   Diagnosis    Rheumatoid arthritis [facility]    Psoriasis    GERD (gastroesophageal reflux disease)    Anemia    Cylindrical bronchiectasis (HCC)    Tracheobronchomalacia    Immunocompromised state ([address]    Hypokalemia    Coronary artery disease    Stasis edema of both lower extremities    Essential hypertension, benign    Lumbar spondylosis    Mitral valve insufficiency and aortic valve insufficiency    Mixed hyperlipidemia    Myopia of both eyes    Osteoporosis    Other chronic sinusitis    Primary open angle glaucoma (POAG) of both eyes, mild stage    Primary osteoarthritis of right hip    Type 2 diabetes mellitus with diabetic polyneuropathy, with long-term current use of insulin (HCC)    Type 2 diabetes mellitus with unspecified diabetic retinopathy without macular edema (HCC)    Uncontrolled type 2 diabetes mellitus with diabetic peripheral angiopathy without gangrene, with long-term current use of insulin (HCC)    Pressure ulcer of coccygeal region, stage 4 ([address]    NSTEMI (non-ST elevated myocardial infarction) ([address]       Measurements:  Negative Pressure Wound Therapy Sacrum (Active)   $ Standard NPWT <=50 sq cm PER TX $ Yes 04/26/21 1606   Wound Type Pressure ulcer: Stage IV 04/26/21 1606   Unit Type [facility] [id number] 04/26/21 1606   Dressing Type Veraflo 04/26/21 1606   Number of pieces used 1 04/26/21 1606   Cycle Continuous 04/26/21 1606   Target Pressure (mmHg) 125 04/26/21 1606   Intensity 5 04/26/21 1606   Irrigation Solution Vashe 04/26/21 1606   Instillation Volume  26 mL 04/26/21 1606   Soak Time  6 minutes 04/26/21 1606   Vac Frequency 4 hours 04/26/21 1606   Canister changed? Yes 04/26/21 1606   Dressing Status New drainage 04/26/21 1606   Dressing Changed Changed/New 04/26/21 1606   Drainage Amount Small 04/26/21 1606   Drainage Description Serosanguinous 04/26/21 1606   Dressing Change Due 04/28/21 04/26/21 1606   Wound Assessment Pale;Pink 04/26/21 1606   Blanca-wound Assessment Maceration 04/26/21 1606   Shape oval 04/26/21 1606   Odor Mild 04/26/21 1606   Number of days: 96       Wound 12/18/20 #2, Sacrum, Pressure Injury, Stage 4, Onset 5/2020 (Active)   Wound Image   04/26/21 1606   Wound Etiology Pressure Stage  4 04/26/21 1606   Dressing Status New dressing applied 04/26/21 1606   Wound Cleansed Cleansed with saline 04/26/21 1606   Dressing/Treatment Barrier film;Hydrocolloid; Negative pressure wound therapy 04/26/21 1606   Dressing Change Due 04/28/21 04/26/21 1606   Wound Length (cm) 3.8 cm 04/26/21 1606   Wound Width (cm) 2.5 cm 04/26/21 1606   Wound Depth (cm) 1.7 cm 04/26/21 1606   Wound Surface Area (cm^2) 9.5 cm^2 04/26/21 1606   Change in Wound Size % (l*w) -6233.33 04/26/21 1606   Wound Volume (cm^3) 16.15 cm^3 04/26/21 1606   Wound Healing % -40419 04/26/21 1606   Post-Procedure Length (cm) 3.2 cm 04/21/21 1001   Post-Procedure Width (cm) 1 cm 04/21/21 1001   Post-Procedure Depth (cm) 1.7 cm 04/21/21 1001   Post-Procedure Surface Area (cm^2) 3.2 cm^2 04/21/21 1001   Post-Procedure Volume (cm^3) 5.44 cm^3 04/21/21 1001   Distance Tunneling (cm) 0 cm 04/26/21 1606   Tunneling Position ___ O'Clock 0 04/26/21 1606   Undermining Starts ___ O'Clock 600 04/26/21 1606   Undermining Ends___ O'Clock 1200 04/26/21 1606   Undermining Maxium Distance (cm) 3.2 04/26/21 1606   Wound Assessment Pink/red;Pale granulation tissue 04/26/21 1606   Drainage Amount Small 04/26/21 1606   Drainage Description Serosanguinous 04/26/21 1606   Odor None 04/26/21 1606   Maegan-wound Assessment Maceration 04/26/21 1606   Margins Defined edges; Unattached edges 04/26/21 1606   Wound Thickness Description not for Pressure Injury Full thickness 04/26/21 1606   Number of days: 129      Mid sacrum:            Response to treatment:  Well tolerated by patient. Pain Assessment:  Severity:  0 / 10  Quality of pain: N/A  Wound Pain Timing/Severity: none  Premedicated: No    Plan   Plan of Care: Wound 12/18/20 #2, Sacrum, Pressure Injury, Stage 4, Onset 5/2020-Dressing/Treatment: Barrier film, Hydrocolloid, Negative pressure wound therapy    Recommend:  Negative pressure wound therapy to mid sacral wound. Cleaned with normal saline. Applied barrier film then hydrocolloid to maegan wound. One black sponge applied to wound bed then connected to 125 mmHg Medium suction with Vashe irrigation fluid instill 26 ml, dwell 6  Minutes every 3.5 hours. Wound care to follow.   Call Wound care for problems with seal at [phone]. Call wound care for VASHE irrigation fluid. Specialty Bed Required : Yes  isoflex mattress  [] Low Air Loss   [x] Pressure Redistribution  [] Fluid Immersion  [] Bariatric  [] Total Pressure Relief  [] Other:     Current Diet: DIET CARDIAC; Carb Control: 4 carb choices (60 gms)/meal  Dietician consult:  Yes ordered. Recommend 80-90 gm protein /day. Discharge Plan:  Placement for patient upon discharge: skilled nursing    Patient appropriate for Outpatient [address] [facility] [address]: Yes    Referrals:  []  / discharge planner - following  [] [facility] recommend  [] Supplies  [] Other    Patient/Caregiver Teaching: Instructed patient and 3 family members on instill VAC veraflo therapy.   Level of patient/caregiver understanding able to:   [] Indicates understanding       [x] Needs reinforcement  [] Unsuccessful      [x] Verbal Understanding  [] Demonstrated understanding       [] No evidence of learning  [] Refused teaching         [] N/A       Electronically signed by Susy Alvarenga RN, MSN, Johny Viveros on 4/26/2021 at 4:59 PM

## 2021-04-26 NOTE — POST SEDATION
Patient:  Rama Garza   :   1950    A pre-sedation re-evaluation was performed immediately at the end of the procedure. Procedure:  Cardiac cath  Medications: Procedural sedation with minimal conscious sedation  Complications: None  Estimated Blood Loss: none  Specimens: Were not obtained        [place] Medication and Procedural Reconciliation:  I agree that the documented medications and procedures performed are true. The medications were given under my order. The procedures were performed under my direct supervision.

## 2021-04-26 NOTE — PROGRESS NOTES
Hospitalist Progress Note      PCP: Jayda Skaggs MD    Date of Admission: 4/24/2021    Chief Complaint: Chest Pain    Subjective: no new c/o. Medications:  Reviewed    Infusion Medications    dextrose 100 mL/hr (04/26/21 0459)    sodium chloride 25 mL (04/24/21 2037)    heparin (PORCINE) Infusion 13 mL/hr (04/25/21 2022)     Scheduled Medications    gabapentin  300 mg Oral BID    morphine  15 mg Oral 2 times per day    aspirin EC  81 mg Oral Daily    atorvastatin  40 mg Oral Nightly    DULoxetine  60 mg Oral Daily    insulin glargine  30 Units Subcutaneous Nightly    metoprolol succinate  25 mg Oral Daily    losartan  50 mg Oral Daily    pantoprazole  40 mg Oral Daily    Roflumilast  500 mcg Oral Nightly    torsemide  40 mg Oral Daily    sodium chloride flush  5-40 mL Intravenous 2 times per day    insulin lispro  0-18 Units Subcutaneous TID WC    insulin lispro  0-9 Units Subcutaneous Nightly    ticagrelor  90 mg Oral BID    cefTRIAXone (ROCEPHIN) IV  1,000 mg Intravenous Q24H     PRN Meds: dextrose, oxyCODONE-acetaminophen, albuterol sulfate HFA, cyclobenzaprine, heparin (porcine), heparin (porcine), sodium chloride flush, sodium chloride, promethazine **OR** ondansetron, acetaminophen **OR** acetaminophen, polyethylene glycol, perflutren lipid microspheres, nitroGLYCERIN, glucose, dextrose, glucagon (rDNA)      Intake/Output Summary (Last 24 hours) at 4/26/2021 0943  Last data filed at 4/26/2021 0646  Gross per 24 hour   Intake 360 ml   Output 2350 ml   Net -1990 ml       Physical Exam Performed:    /62   Pulse 82   Temp 97.3 °F (36.3 °C) (Oral)   Resp 16   Ht 5' 10\" (1.778 m)   Wt 160 lb (72.6 kg)   SpO2 96%   BMI 22.96 kg/m²     General appearance: No apparent distress, appears stated age and cooperative. HEENT: Pupils equal, round, and reactive to light. Conjunctivae/corneas clear. Neck: Supple, with full range of motion. No jugular venous distention.  Trachea -Admitted to  MedSurg/telemetry; EKG abnormal with ST-T changes in lateral leads.  Troponin  0.58 on presentation being trended.  -On heparin GTT.  Cardiology consulted with plans to do LHC on 4/26/2021 tentatively. -Echo on 4/24/2021 showed normal LVEF 55%; no R WMA; mild concentric LVH  -Continue aspirin, Lipitor, beta-blocker, ARB.  Sublingual nitro and morphine as needed for pain      Exertional shortness of breath on admission likely due to chronic COPD without exacerbation  -CTPA negative for PE but showed diffuse emphysematous changes in bilateral lower lungs.  Likely presentation related to NSTEMI.  Continue current care. -HHN as needed  -Elevated pro calcitonin (0.75) on admission.  Started on empiric Rocephin.  Will follow blood and sputum cultures. HTN - w/out known CAD and no evidence of active signs/sxs of ischemia/failure. Currently controlled on home meds w/ vitals reviewed and documented as above.     DM2 - controlled on home Insulin - continued. Follow FSBS/SSI high regimen. Last HbA1c 7.8% dated this admission. Anticipate resuming/continuing home regimen at discharge.      Chronic Back Pain - Patient w/ Chronic Opioid dependence POArrival w/out signs/sxs of active abuse and or w/drawal. Continue current mgt w/ opioids as ordered and assess for efficacy. GERD - w/out active signs/sxs of dysphagia/odynophagia. No evidence of active PUD or hx of GI bleed. Controlled on home PPI - continue. HypoKalemia - etiology clinically unable to determine, possibly 2nd to diuretics. Follow serial labs and replace PRN. Reviewed and documented as above. Anemia - etiology clinically unable to determine, w/out evidence of active bleeding/hemolysis. Asymptomatic w/out indication for transfusion. Follow serial labs.   Reviewed and documented as above.          DVT Prophylaxis: Heparin gtt, on ASA/Brilinta  Diet: Diet NPO, After Midnight  Code Status: Full Code      PT/OT Eval Status: not yet ordered. Dispo - possibly Monday but more likely Tues/Wed 27/28 April pending post-cath recs.      Lam Francisco MD

## 2021-04-26 NOTE — PLAN OF CARE
Problem: Nutrition  Goal: Optimal nutrition therapy  Outcome: Ongoing  Note: Nutrition Problem #1: Increased nutrient needs  Intervention: Food and/or Nutrient Delivery: Start Oral Diet, Start Oral Nutrition Supplement  Nutritional Goals: Pt will consume greater than 50% of meals and ONS this admission

## 2021-04-26 NOTE — PROGRESS NOTES
Page sent to Dr Rukhsana Schmidt to clarify re-starting heparin drip post cath, waiting for call back. Received call back from Dr Rukhsana Schmidt, [facility] to restart heparin.

## 2021-04-26 NOTE — PRE SEDATION
Brief Pre-Op Note/Sedation Assessment      Willie Barhnart  1950  Cath Pool Rm/NONE      [id number]  9:52 AM    Planned Procedure: Cardiac Catheterization Procedure    Post Procedure Plan: Return to same level of care    Consent: I have discussed with the patient and/or the patient representative the indication, alternatives, and the possible risks and/or complications of the planned procedure and the anesthesia methods. The patient and/or patient representative appear to understand and agree to proceed. Chief Complaint: NSTEMI      Indications for Cath Procedure:  Suspected CAD  Anginal Classification within 2 weeks:  CCS IV - Inability to perform any activity without angina or angina at rest, i.e., severe limitation  NYHA Heart Failure Class within 2 weeks: Class III - Symptoms of HF on less-than-ordinary exertion, Newly Diagnosed? Yes, Heart Failure Type: Unknown  Is Cath Lab Visit Valve-related?: No  Surgical Risk: High Risk: Non-Vascular  Functional Type: < 4 METS    Anti- Anginal Meds within 2 weeks:   Yes: Aspirin    Stress or Imaging Studies Performed (within 6 months):  None     Vital Signs:  /62   Pulse 82   Temp 97.3 °F (36.3 °C) (Oral)   Resp 16   Ht 5' 10\" (1.778 m)   Wt 160 lb (72.6 kg)   SpO2 96%   BMI 22.96 kg/m²     Allergies:   Allergies   Allergen Reactions    Atenolol      Cough         Past Medical History:  Past Medical History:   Diagnosis Date    Atherosclerosis of native artery of right lower extremity with rest pain ([address] 07/25/2017    Back pain     Branch retinal vein occlusion 07/20/2012    Bronchiectasis with acute exacerbation (HCC)     Closed compression fracture of thoracic vertebra ([address] 01/15/2020    Closed fracture of facial bone with routine healing 11/21/2016    Closed jaw fracture ([address] 01/15/2020    Community acquired pneumonia of left lower lobe of lung     Compression fracture of L1 lumbar vertebra ([address] 01/15/2020    Fracture of tibial plateau, closed, left, initial encounter 12/05/2017    Minimally displaced zone I fracture of sacrum (HCC) 09/02/2020    Mucus plugging of bronchi     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 Bone density test last year showed severe osteoporosis. Recently, two broken vertebrae (L1, L2) due to coughing. Diagnosed with tracheomalacia and stated she must cough very hard to clear phlegm. Was coughing due to upper respiratory infections which have been treated. Hx of laminectomy and recent kyphoplasty.    Refractured her jawbone which was previously repaired wi    Post herpetic neuralgia     Proximal humerus fracture 10/01/2019    Shingles 05/2020    Sleep apnea     Temporal arteritis ([address] 07/10/2013    Tobacco use 10/19/2017    Vitreous hemorrhage, right eye ([address] 02/21/2020         Surgical History:  Past Surgical History:   Procedure Laterality Date    ARTERY BIOPSY Right 03/01/2021    at [address]  5/30/13    left temporal artery biopsy    BACK SURGERY  08/2020    BRONCHOSCOPY      BRONCHOSCOPY N/A 6/12/2019    [facility]      FOOT SURGERY      HERNIA REPAIR      KNEE ARTHROSCOPY      left    KYPHOSIS SURGERY      LAMINECTOMY      MANDIBLE FRACTURE SURGERY      MANDIBLE FRACTURE SURGERY  02/2020    OTHER SURGICAL HISTORY  01/08/2021    sacral wound debridement    PRESSURE ULCER DEBRIDEMENT N/A 1/8/2021    SACRAL WOUND DEBRIDEMENT performed by Garland Nissen, MD at [address] [place] [address]  5/7/2013    FESS with balloon    SPINAL FUSION      TUBAL LIGATION      UPPER GASTROINTESTINAL ENDOSCOPY  4/8/2014    Dilitation         Medications:  Current Facility-Administered Medications   Medication Dose Route Frequency Provider Last Rate Last Admin    dextrose 5 % solution  100 mL/hr Intravenous PRN Maria Rjoy Phelan, APRN -  mL/hr at 04/26/21 0459 100 mL/hr at 04/26/21 0459    gabapentin (NEURONTIN) capsule 300 mg  300 mg Oral BID Emily Chu MD   300 mg at 04/25/21 2023    oxyCODONE-acetaminophen (PERCOCET)  MG per tablet 1 tablet  1 tablet Oral BID PRN Emily Chu MD   1 tablet at 04/26/21 0324    morphine (MS CONTIN) extended release tablet 15 mg  15 mg Oral 2 times per day Emily Chu MD   15 mg at 04/25/21 2022    albuterol sulfate  (90 Base) MCG/ACT inhaler 2 puff  2 puff Inhalation Q4H PRN [place], APRN - CNP        aspirin EC tablet 81 mg  81 mg Oral Daily [place] T[place], APRN - CNP   81 mg at 04/26/21 0925    atorvastatin (LIPITOR) tablet 40 mg  40 mg Oral Nightly [place] T[place], APRN - CNP   40 mg at 04/25/21 2023    cyclobenzaprine (FLEXERIL) tablet 10 mg  10 mg Oral BID PRN [place] T[place], APRN - CNP   10 mg at 04/26/21 0323    DULoxetine (CYMBALTA) extended release capsule 60 mg  60 mg Oral Daily [place] Tapan, APRN - CNP   60 mg at 04/26/21 0925    insulin glargine (LANTUS) injection vial 30 Units  30 Units Subcutaneous Nightly [place] T[place], APRN - CNP   30 Units at 04/25/21 2027    metoprolol succinate (TOPROL XL) extended release tablet 25 mg  25 mg Oral Daily [place] T[place], APRN - CNP   25 mg at 04/26/21 0759    losartan (COZAAR) tablet 50 mg  50 mg Oral Daily Maria R Mage, APRN - CNP        pantoprazole (PROTONIX) tablet 40 mg  40 mg Oral Daily [place] Mage, APRN - CNP   40 mg at 04/25/21 0829    Roflumilast (DALIRESP) tablet 500 mcg  500 mcg Oral Nightly [place] Mage, APRN - CNP   500 mcg at 04/25/21 2023    torsemide (DEMADEX) tablet 40 mg  40 mg Oral Daily Maria R Mage, APRN - CNP   40 mg at 04/25/21 0829    heparin (porcine) injection 2,000 Units  2,000 Units Intravenous PRN Maria R Mage, APRN - CNP        heparin (porcine) injection 4,000

## 2021-04-26 NOTE — PROGRESS NOTES
Comprehensive Nutrition Assessment    Type and Reason for Visit:  Initial, Positive Nutrition Screen(MST=3: weight loss, decreased appetite, wound)    Nutrition Recommendations/Plan:   1. Advance diet as feasible to cardiac, carb control   2. Add Glucerna TID when diet advances   3. Obtain new weight   4. Needs better BG control - MD to advise   5. Monitor nutrition adequacy, pertinent labs, bowel habits, wt changes, and clinical progress    Nutrition Assessment:  Pt admitted with NSTEMI, plans for heart cath today. Hx of osteoprosis and atherosclerosis. Pt unavailable after multiple attempts. PO intakes of 25-50 and % this admission, but currently NPO for heart cath. Pt has wound vac, increased protein needs. Will add ONS to optimize nutrition following diet advancement. Malnutrition Assessment:  Malnutrition Status: At risk for malnutrition (Comment)    Context:  Acute Illness       Estimated Daily Nutrient Needs:  Energy (kcal):  1577-3271 kcal; Weight Used for Energy Requirements:  Current(73 kg)     Protein (g):  73-88 g; Weight Used for Protein Requirements:  Current(1.0-1.2 g/kg)        Fluid (ml/day):   ; Method Used for Fluid Requirements:  1 ml/kcal      Nutrition Related Findings:  BG labile. Upper and lower dentures.       Wounds:  Pressure Injury, Stage IV, Wound Vac       Current Nutrition Therapies:    Diet NPO, After Midnight    Anthropometric Measures:  · Height: 5' 10\" (177.8 cm)  · Current Body Weight: 160 lb (72.6 kg)   · Usual Body Weight: (165-170 lb per EMR weight hx)     · Ideal Body Weight: 150 lbs; % Ideal Body Weight 106.7 %   · BMI: 23  · BMI Categories: Normal Weight (BMI 22.0 to 24.9) age over 72       Nutrition Diagnosis:   · Increased nutrient needs related to inadequate protein-energy intake, increase demand for energy/nutrients as evidenced by poor intake prior to admission, weight loss, wounds      Nutrition Interventions:   Food and/or Nutrient Delivery:  Start Oral Diet, Start Oral Nutrition Supplement  Nutrition Education/Counseling:  No recommendation at this time   Coordination of Nutrition Care:  Continue to monitor while inpatient    Goals:  Pt will consume greater than 50% of meals and ONS this admission       Nutrition Monitoring and Evaluation:   Behavioral-Environmental Outcomes:  None Identified   Food/Nutrient Intake Outcomes:  Diet Advancement/Tolerance, Food and Nutrient Intake, Supplement Intake  Physical Signs/Symptoms Outcomes:  Biochemical Data, Nutrition Focused Physical Findings, Weight     Discharge Planning:     Too soon to determine     Electronically signed by Madalyn Barnes MS, RD, LD on 4/26/21 at 1:38 PM EDT    Contact: [phone]

## 2021-04-26 NOTE — PRE SEDATION
Patient:  Tala Franz   :   1950    A pre-sedation re-evaluation was performed immediately at the end of the procedure. Procedure:  Cardiac cath  Medications: Procedural sedation with minimal conscious sedation  Complications: None  Estimated Blood Loss: none  Specimens: Were not obtained        [place] Medication and Procedural Reconciliation:  I agree that the documented medications and procedures performed are true. The medications were given under my order. The procedures were performed under my direct supervision.

## 2021-04-26 NOTE — PROCEDURES
Michael       Cardiac Catheterization Lab Report    PATIENT: Sushant Pavon  DATE: 2021  MRN: [id number]  CSN: [id number]  : 1950      Performing Physician: Miguel Love MD, LANE, [facility]  Primary Care Physician: Wendelin Carrel, MD  Admitting/Referring provider: Jaskaran Mckeon MD     Procedures Performed:   1. Left heart catheterization  2. Selective left and right coronary angiogram  3. Left ventriculography     Procedure Findings:  1. Severe multi vessel coronary artery diease   ~not amenable to PCI  2. Normal left ventricular function with EF estimated at 55-60%  3. Normal left heart hemodynamics    Indications:   Patient Active Problem List   Diagnosis    Rheumatoid arthritis ([address]    Psoriasis    GERD (gastroesophageal reflux disease)    Anemia    Cylindrical bronchiectasis ([address]    Tracheobronchomalacia    Immunocompromised state ([address]    Hypokalemia    Coronary artery disease    Stasis edema of both lower extremities    Essential hypertension, benign    Lumbar spondylosis    Mitral valve insufficiency and aortic valve insufficiency    Mixed hyperlipidemia    Myopia of both eyes    Osteoporosis    Other chronic sinusitis    Primary open angle glaucoma (POAG) of both eyes, mild stage    Primary osteoarthritis of right hip    Type 2 diabetes mellitus with diabetic polyneuropathy, with long-term current use of insulin (HCC)    Type 2 diabetes mellitus with unspecified diabetic retinopathy without macular edema (HCC)    Uncontrolled type 2 diabetes mellitus with diabetic peripheral angiopathy without gangrene, with long-term current use of insulin (HCC)    Pressure ulcer of coccygeal region, stage 4 ([address]    NSTEMI (non-ST elevated myocardial infarction) ([address]       Details:   Sushant Pavon was brought to the cardiac catheterization lab in a fasting state after informed consent was obtained.  If the patient was able to provide written consent, it was obtained. The patient's vitals were monitored through out the procedure. The patient was sedated using the appropriate levels of sedation and ASA guidelines. The appropriate access site area was prepped and drapped in a sterile fashion. The area was anesthetized with 2% lidocaine. Using the modified Seldinger technique, an arterial sheath was introduced into the arterial access site using a single anterior wall puncture. The sheath was flushed and prepped in usual fashion. Catheters used during the procedure included 5 [place] TIG 4.0 catheter. The catheters were advanced and removed over a .035\" wire, into the appropriate positions. Multiple angiographic views were obtained. An LV gram was obtained. Findings:    1. Left main coronary artery distal 70%. It gave off the left anterior descending artery and left circumflex. 2. Left anterior descending artery has severe atherosclerotic disease. 70% ostial. It was moderate in size. It gave off septal perforators and a moderate sized diagonal branch. The LAD covered the entire apex of the left ventricle. 3. Left circumflex has severe atherosclerotic disease. 99% ostial. It was moderate in size. There was a moderate sized obtuse marginal branch. ~OM1     4. Right coronary artery has severe atherosclerotic disease. It was moderate in size and was the dominant artery. ~100% distal PDA     5. Left ventriculogram showed normal LVEF at 50%. Wall motion was normal . There was no significant mitral valve or aortic valve disease noted. LVEDP was normal. There was no gradient noted across the aortic valve during pullback of the catheter. /62   Pulse 82   Temp 97.3 °F (36.3 °C) (Oral)   Resp 16   Ht 5' 10\" (1.778 m)   Wt 160 lb (72.6 kg)   SpO2 96%   BMI 22.96 kg/m²     The access site was controlled with manual pressure and/or appropriate closure device. Moderate Conscious Sedation Details:   An independent trained observer pushed medications at my direction. We monitoring the patient's level of consciousness and vital signs/physiologic status throughout the procedure. CPT codes 79124 and 85288      Start time: 0958  Stop time: 10007  ASA class: 4    Sedation totals:  Versad - 2mg  Fentanyl - 50mcg    EBL - minimal <5 cc blood loss    The patient was monitored continuously with the ECG, pulse oximetry, blood pressure, and direct observation. CONCLUSIONS:    1. Severe multi-vessel coronary artery disease    ASSESSMENT/RECOMMENDATIONS:    1.  CABG consult      Carla Whitley MD, LANE, [place] [id number] Cardiology  [facility]   [phone] Main central office  [phone] [facility] office  4/26/2021  10:18 AM

## 2021-04-26 NOTE — PROGRESS NOTES
BG 49. Per pt, no s&s of hypoglycemia. Since pt is NPO for heart cath today, administered D50 and D5. Pca to recheck BG shortly. Edu pt to notifiy RN if she has feelings of hypoglycemia. Pt verbalized understanding.

## 2021-04-27 ENCOUNTER — APPOINTMENT (OUTPATIENT)
Dept: VASCULAR LAB | Age: 71
DRG: 233 | End: 2021-04-27
Attending: INTERNAL MEDICINE
Payer: MEDICARE

## 2021-04-27 LAB
ALBUMIN SERPL-MCNC: 2.5 G/DL (ref 3.4–5)
ANION GAP SERPL CALCULATED.3IONS-SCNC: 8 MMOL/L (ref 3–16)
APTT: 66.9 SEC (ref 24.2–36.2)
BUN BLDV-MCNC: 10 MG/DL (ref 7–20)
CALCIUM SERPL-MCNC: 8.4 MG/DL (ref 8.3–10.6)
CHLORIDE BLD-SCNC: 102 MMOL/L (ref 99–110)
CO2: 25 MMOL/L (ref 21–32)
CREAT SERPL-MCNC: 0.8 MG/DL (ref 0.6–1.2)
GFR AFRICAN AMERICAN: >60
GFR NON-AFRICAN AMERICAN: >60
GLUCOSE BLD-MCNC: 169 MG/DL (ref 70–99)
GLUCOSE BLD-MCNC: 176 MG/DL (ref 70–99)
GLUCOSE BLD-MCNC: 189 MG/DL (ref 70–99)
GLUCOSE BLD-MCNC: 305 MG/DL (ref 70–99)
GLUCOSE BLD-MCNC: 311 MG/DL (ref 70–99)
HCT VFR BLD CALC: 28.1 % (ref 36–48)
HEMOGLOBIN: 9.2 G/DL (ref 12–16)
MCH RBC QN AUTO: 27.7 PG (ref 26–34)
MCHC RBC AUTO-ENTMCNC: 32.8 G/DL (ref 31–36)
MCV RBC AUTO: 84.5 FL (ref 80–100)
PDW BLD-RTO: 15.5 % (ref 12.4–15.4)
PERFORMED ON: ABNORMAL
PHOSPHORUS: 3.4 MG/DL (ref 2.5–4.9)
PLATELET # BLD: 213 K/UL (ref 135–450)
PMV BLD AUTO: 7.8 FL (ref 5–10.5)
POTASSIUM SERPL-SCNC: 4.3 MMOL/L (ref 3.5–5.1)
RBC # BLD: 3.33 M/UL (ref 4–5.2)
SODIUM BLD-SCNC: 135 MMOL/L (ref 136–145)
WBC # BLD: 3.9 K/UL (ref 4–11)

## 2021-04-27 PROCEDURE — 80069 RENAL FUNCTION PANEL: CPT

## 2021-04-27 PROCEDURE — 94727 GAS DIL/WSHOT DETER LNG VOL: CPT

## 2021-04-27 PROCEDURE — 6360000002 HC RX W HCPCS: Performed by: INTERNAL MEDICINE

## 2021-04-27 PROCEDURE — 2580000003 HC RX 258: Performed by: INTERNAL MEDICINE

## 2021-04-27 PROCEDURE — 6370000000 HC RX 637 (ALT 250 FOR IP): Performed by: THORACIC SURGERY (CARDIOTHORACIC VASCULAR SURGERY)

## 2021-04-27 PROCEDURE — 36415 COLL VENOUS BLD VENIPUNCTURE: CPT

## 2021-04-27 PROCEDURE — 6370000000 HC RX 637 (ALT 250 FOR IP): Performed by: INTERNAL MEDICINE

## 2021-04-27 PROCEDURE — 97116 GAIT TRAINING THERAPY: CPT

## 2021-04-27 PROCEDURE — 94060 EVALUATION OF WHEEZING: CPT

## 2021-04-27 PROCEDURE — 85027 COMPLETE CBC AUTOMATED: CPT

## 2021-04-27 PROCEDURE — 94760 N-INVAS EAR/PLS OXIMETRY 1: CPT

## 2021-04-27 PROCEDURE — 94729 DIFFUSING CAPACITY: CPT

## 2021-04-27 PROCEDURE — 99232 SBSQ HOSP IP/OBS MODERATE 35: CPT | Performed by: NURSE PRACTITIONER

## 2021-04-27 PROCEDURE — 99233 SBSQ HOSP IP/OBS HIGH 50: CPT | Performed by: INTERNAL MEDICINE

## 2021-04-27 PROCEDURE — 97161 PT EVAL LOW COMPLEX 20 MIN: CPT

## 2021-04-27 PROCEDURE — 93971 EXTREMITY STUDY: CPT

## 2021-04-27 PROCEDURE — 2060000000 HC ICU INTERMEDIATE R&B

## 2021-04-27 PROCEDURE — 93880 EXTRACRANIAL BILAT STUDY: CPT

## 2021-04-27 PROCEDURE — 85730 THROMBOPLASTIN TIME PARTIAL: CPT

## 2021-04-27 RX ORDER — ALBUTEROL SULFATE 90 UG/1
4 AEROSOL, METERED RESPIRATORY (INHALATION) ONCE
Status: COMPLETED | OUTPATIENT
Start: 2021-04-27 | End: 2021-04-27

## 2021-04-27 RX ADMIN — GABAPENTIN 300 MG: 300 CAPSULE ORAL at 20:44

## 2021-04-27 RX ADMIN — INSULIN LISPRO 3 UNITS: 100 INJECTION, SOLUTION INTRAVENOUS; SUBCUTANEOUS at 12:39

## 2021-04-27 RX ADMIN — HEPARIN SODIUM 13 ML/HR: 10000 INJECTION, SOLUTION INTRAVENOUS at 12:38

## 2021-04-27 RX ADMIN — ACETAMINOPHEN 650 MG: 325 TABLET ORAL at 04:29

## 2021-04-27 RX ADMIN — INSULIN GLARGINE 30 UNITS: 100 INJECTION, SOLUTION SUBCUTANEOUS at 20:44

## 2021-04-27 RX ADMIN — CEFTRIAXONE SODIUM 1000 MG: 1 INJECTION, POWDER, FOR SOLUTION INTRAMUSCULAR; INTRAVENOUS at 12:46

## 2021-04-27 RX ADMIN — ATORVASTATIN CALCIUM 40 MG: 40 TABLET, FILM COATED ORAL at 20:44

## 2021-04-27 RX ADMIN — PANTOPRAZOLE SODIUM 40 MG: 40 TABLET, DELAYED RELEASE ORAL at 08:49

## 2021-04-27 RX ADMIN — Medication 4 PUFF: at 16:32

## 2021-04-27 RX ADMIN — GABAPENTIN 300 MG: 300 CAPSULE ORAL at 08:50

## 2021-04-27 RX ADMIN — ASPIRIN 81 MG: 81 TABLET, COATED ORAL at 08:50

## 2021-04-27 RX ADMIN — METOPROLOL SUCCINATE 25 MG: 25 TABLET, EXTENDED RELEASE ORAL at 08:49

## 2021-04-27 RX ADMIN — MORPHINE SULFATE 15 MG: 15 TABLET, FILM COATED, EXTENDED RELEASE ORAL at 20:44

## 2021-04-27 RX ADMIN — SODIUM CHLORIDE, PRESERVATIVE FREE 10 ML: 5 INJECTION INTRAVENOUS at 20:45

## 2021-04-27 RX ADMIN — MORPHINE SULFATE 15 MG: 15 TABLET, FILM COATED, EXTENDED RELEASE ORAL at 08:49

## 2021-04-27 RX ADMIN — INSULIN LISPRO 6 UNITS: 100 INJECTION, SOLUTION INTRAVENOUS; SUBCUTANEOUS at 20:44

## 2021-04-27 RX ADMIN — INSULIN LISPRO 3 UNITS: 100 INJECTION, SOLUTION INTRAVENOUS; SUBCUTANEOUS at 08:52

## 2021-04-27 RX ADMIN — ROFLUMILAST 500 MCG: 500 TABLET ORAL at 20:44

## 2021-04-27 RX ADMIN — DULOXETINE HYDROCHLORIDE 60 MG: 60 CAPSULE, DELAYED RELEASE ORAL at 08:49

## 2021-04-27 RX ADMIN — TORSEMIDE 40 MG: 20 TABLET ORAL at 08:49

## 2021-04-27 RX ADMIN — LOSARTAN POTASSIUM 50 MG: 25 TABLET, FILM COATED ORAL at 08:49

## 2021-04-27 RX ADMIN — INSULIN LISPRO 12 UNITS: 100 INJECTION, SOLUTION INTRAVENOUS; SUBCUTANEOUS at 17:32

## 2021-04-27 RX ADMIN — OXYCODONE AND ACETAMINOPHEN 1 TABLET: 10; 325 TABLET ORAL at 15:13

## 2021-04-27 ASSESSMENT — PAIN DESCRIPTION - PROGRESSION: CLINICAL_PROGRESSION: GRADUALLY IMPROVING

## 2021-04-27 ASSESSMENT — PAIN SCALES - GENERAL
PAINLEVEL_OUTOF10: 9
PAINLEVEL_OUTOF10: 7
PAINLEVEL_OUTOF10: 6
PAINLEVEL_OUTOF10: 3
PAINLEVEL_OUTOF10: 8

## 2021-04-27 ASSESSMENT — PAIN DESCRIPTION - ONSET: ONSET: ON-GOING

## 2021-04-27 ASSESSMENT — PAIN DESCRIPTION - LOCATION: LOCATION: BACK

## 2021-04-27 ASSESSMENT — PAIN - FUNCTIONAL ASSESSMENT: PAIN_FUNCTIONAL_ASSESSMENT: PREVENTS OR INTERFERES SOME ACTIVE ACTIVITIES AND ADLS

## 2021-04-27 ASSESSMENT — PAIN DESCRIPTION - PAIN TYPE: TYPE: CHRONIC PAIN

## 2021-04-27 ASSESSMENT — PAIN DESCRIPTION - DESCRIPTORS: DESCRIPTORS: DISCOMFORT;ACHING

## 2021-04-27 ASSESSMENT — PAIN DESCRIPTION - ORIENTATION: ORIENTATION: LOWER

## 2021-04-27 NOTE — PROGRESS NOTES
Hospitalist Progress Note      PCP: Erasto Zamarripa MD    Date of Admission: 4/24/2021    Chief Complaint: Chest Pain    Subjective: no new c/o. Medications:  Reviewed    Infusion Medications    dextrose 100 mL/hr (04/26/21 0459)    vashe wound therapy      sodium chloride 25 mL (04/24/21 2037)    heparin (PORCINE) Infusion 13 mL/hr (04/26/21 1725)     Scheduled Medications    gabapentin  300 mg Oral BID    morphine  15 mg Oral 2 times per day    aspirin EC  81 mg Oral Daily    atorvastatin  40 mg Oral Nightly    DULoxetine  60 mg Oral Daily    insulin glargine  30 Units Subcutaneous Nightly    metoprolol succinate  25 mg Oral Daily    losartan  50 mg Oral Daily    pantoprazole  40 mg Oral Daily    Roflumilast  500 mcg Oral Nightly    torsemide  40 mg Oral Daily    sodium chloride flush  5-40 mL Intravenous 2 times per day    insulin lispro  0-18 Units Subcutaneous TID WC    insulin lispro  0-9 Units Subcutaneous Nightly    cefTRIAXone (ROCEPHIN) IV  1,000 mg Intravenous Q24H     PRN Meds: dextrose, hydrALAZINE, vashe wound therapy, oxyCODONE-acetaminophen, albuterol sulfate HFA, cyclobenzaprine, heparin (porcine), heparin (porcine), sodium chloride flush, sodium chloride, promethazine **OR** ondansetron, acetaminophen **OR** acetaminophen, polyethylene glycol, perflutren lipid microspheres, nitroGLYCERIN, glucose, dextrose, glucagon (rDNA)      Intake/Output Summary (Last 24 hours) at 4/27/2021 0845  Last data filed at 4/26/2021 1858  Gross per 24 hour   Intake --   Output 800 ml   Net -800 ml       Physical Exam Performed:    BP (!) 121/58   Pulse 70   Temp 97.9 °F (36.6 °C) (Oral)   Resp 18   Ht 5' 10\" (1.778 m)   Wt 160 lb (72.6 kg)   SpO2 93%   BMI 22.96 kg/m²     General appearance: No apparent distress, appears stated age and cooperative. HEENT: Pupils equal, round, and reactive to light. Conjunctivae/corneas clear. Neck: Supple, with full range of motion.  No jugular venous distention. Trachea midline. Respiratory:  Normal respiratory effort. Clear to auscultation, bilaterally without Rales/Wheezes/Rhonchi. Cardiovascular: Regular rate and rhythm with normal S1/S2 without murmurs, rubs or gallops. Abdomen: Soft, non-tender, non-distended with normal bowel sounds. Musculoskeletal: No clubbing, cyanosis or edema bilaterally. Full range of motion without deformity. Skin: Skin color, texture, turgor normal.  No rashes or lesions. Neurologic:  Neurovascularly intact without any focal sensory/motor deficits. Cranial nerves: II-XII intact, grossly non-focal.  Psychiatric: Alert and oriented, thought content appropriate, normal insight  Capillary Refill: Brisk,< 3 seconds   Peripheral Pulses: +2 palpable, equal bilaterally       Labs:   Recent Labs     04/25/21  0954 04/27/21  0527   WBC 4.3 3.9*   HGB 10.4* 9.2*   HCT 31.8* 28.1*    213     Recent Labs     04/25/21  0954 04/26/21  0855 04/27/21  0527   * 135* 135*   K 2.7* 3.3* 4.3   CL 93* 97* 102   CO2 27 28 25   BUN 13 11 10   CREATININE 0.9 0.9 0.8   CALCIUM 8.5 8.6 8.4   PHOS  --  2.5 3.4     No results for input(s): AST, ALT, BILIDIR, BILITOT, ALKPHOS in the last 72 hours. No results for input(s): INR in the last 72 hours. No results for input(s): Ethelene Soulier in the last 72 hours.     Urinalysis:      Lab Results   Component Value Date    NITRU Negative 04/26/2021    WBCUA 0-2 04/26/2021    BACTERIA 1+ 04/23/2021    RBCUA 3-4 04/26/2021    BLOODU TRACE-INTACT 04/26/2021    SPECGRAV 1.010 04/26/2021    GLUCOSEU Negative 04/26/2021    GLUCOSEU NEGATIVE 03/20/2010       Consults:    IP CONSULT TO CARDIOLOGY  IP CONSULT TO DIETITIAN      Assessment/Plan:    Active Hospital Problems    Diagnosis    [facility]non[facility] [I21.4]    Hypokalemia [E87.6]    Anemia [D64.9]    GERD (gastroesophageal reflux disease) [K21.9]    Coronary artery disease [I25.10]       NSTEMI - acute NSTEMI on arrival s/p [facility] per Cardiology 26 April w/ multivessel CAD. CT Surgery consulted for CABG w/ plan to proceed. W/up in progress. COPD - w/out chronic respiratory failure on no baseline home O2. Controlled on home medication regimen - continued. CTPA negative for PE but showed diffuse emphysematous changes in bilateral lower lungs.  Elevated procalcitonin on admission - started on empiric Rocephin 24 April - changed to DAILY dosing but no clinical evidence of PNA. HTN - w/out known CAD and no evidence of active signs/sxs of ischemia/failure. Currently controlled on home meds w/ vitals reviewed and documented as above.     DM2 - controlled on home Insulin - continued. Follow FSBS/SSI high regimen. Last HbA1c 7.8% dated this admission. Anticipate resuming/continuing home regimen at discharge.      Chronic Back Pain - Patient w/ Chronic Opioid dependence POArrival w/out signs/sxs of active abuse and or w/drawal. Continue current mgt w/ opioids as ordered and assess for efficacy. GERD - w/out active signs/sxs of dysphagia/odynophagia. No evidence of active PUD or hx of GI bleed. Controlled on home PPI - continue. HypoKalemia - etiology clinically unable to determine, possibly 2nd to diuretics. Follow serial labs and replace PRN. Reviewed and documented as above. Anemia - etiology clinically unable to determine, w/out evidence of active bleeding/hemolysis. Asymptomatic w/out indication for transfusion. Follow serial labs. Reviewed and documented as above.        DVT Prophylaxis: Heparin gtt, on ASA/Brilinta  Diet: DIET CARDIAC; Carb Control: 4 carb choices (60 gms)/meal  Code Status: Full Code      PT/OT Eval Status: not yet ordered. Dispo - here for the foreseeable future pending CABG.        Godwin Posada MD

## 2021-04-27 NOTE — ADT AUTH CERT
obvious  fluid loses/diuretics               - check [place]an[place] level   3. Aggressive replete of KCL               - 40meq q4hr x 3   4. Cont Heparin ggt   5. Cont ASA, lipitor, cozaar, toprol, ticagrelor loaded   6. Anemia w/u   7.  Cath Monday tomorrow               - if CP reoccurs, notify cards and may consider more urgent cath

## 2021-04-27 NOTE — PROGRESS NOTES
Physical Therapy    Facility/Department: [facility] C4 PCU  Initial Assessment    NAME: Aliya Bustillos  : 1950  MRN: [id number]    Date of Service: 2021    Discharge Recommendations:  Continue to assess pending progress (CABG scheduled next week )     If pt discharges prior to next PT session this note will serve as discharge summary. Assessment   Body structures, Functions, Activity limitations: Decreased functional mobility ; Decreased endurance  Assessment: Pt is 71 yo female adm with NSTEMI, Plans for CABG next week, referred to PT for preop assessment. PLOF: lives with family, indep with ADLs, indep amb with RW or uses w/c for locomotion. Today pt able to perform bed mob modified indep using arms, CG on attempt without UEs. Pt performed indep trnasfers and amb with RW 80 ft with SBA. Pt will benefit from skilled PT to address current deficts and maximize strength, endurance, and mobility in preparation for scheduled CABG. Will cont to assess post op for discharge recs. Treatment Diagnosis: decreased strength, endurance and mobility  Specific instructions for Next Treatment: ex, mobility, gait  Prognosis: Good  Decision Making: Low Complexity  PT Education: Disease Specific Education;Goals;Transfer Training;PT Role;Energy Conservation; Functional Mobility Training;Plan of Care;General Safety;Gait Training;Precautions  Patient Education: Pt and daugter educated in sternal precautions for upcoming CABG surgery. They voice understanding  Barriers to Learning: none  REQUIRES PT FOLLOW UP: Yes  Activity Tolerance  Activity Tolerance: Patient Tolerated treatment well  Activity Tolerance: /68  HR 69   SpO2 98% on room air       Patient Diagnosis(es): There were no encounter diagnoses.      has a past medical history of Atherosclerosis of native artery of right lower extremity with rest pain ([address] Back pain, Branch retinal vein occlusion, Bronchiectasis with acute exacerbation ([address] Closed compression fracture of thoracic vertebra ([address] Closed fracture of facial bone with routine healing, Closed jaw fracture ([address] Community acquired pneumonia of left lower lobe of lung, Compression fracture of L1 lumbar vertebra ([address] COPD (chronic obstructive pulmonary disease) ([address] Fracture of tibial plateau, closed, left, initial encounter, Minimally displaced zone I fracture of sacrum (HCC), Mucus plugging of bronchi, Osteomyelitis of mandible, Osteoporosis with pathological fracture, Post herpetic neuralgia, Proximal humerus fracture, Rheumatoid arthritis ([address] Shingles, Sleep apnea, Temporal arteritis ([address] Tobacco use, Tracheomalacia, and Vitreous hemorrhage, right eye ([address] has a past surgical history that includes hernia repair; Mandible fracture surgery; Foot surgery; Elbow surgery; Cataract removal; Tubal ligation; Knee arthroscopy; Kyphosis surgery; laminectomy; Spinal fusion; Septoplasty (5/7/2013); Artery surgery (5/30/13); Upper gastrointestinal endoscopy (4/8/2014); bronchoscopy; bronchoscopy (N/A, 6/12/2019); Mandible fracture surgery (02/2020); back surgery (08/2020); other surgical history (01/08/2021); Pressure ulcer debridement (N/A, 1/8/2021); and Artery Biopsy (Right, 03/01/2021).     Restrictions  Restrictions/Precautions  Restrictions/Precautions: Fall Risk, General Precautions, Up as Tolerated  Vision/Hearing  Vision: Impaired  Vision Exceptions: Wears glasses for reading  Hearing: Within functional limits     Subjective  General  Chart Reviewed: Yes  Patient assessed for rehabilitation services?: Yes  Family / Caregiver Present: (daughter Wellington Ivan)  Referring Practitioner: Lulu Joya CNP  Referral Date : 04/27/21  Diagnosis: NSTEMI  Follows Commands: Within Functional Limits  General Comment  Comments: pt resting in bed on approach  Subjective  Subjective: Pt agrees to therapy, voices understanding of all instructions  Pain Screening  Patient Currently in Pain: Yes  Pain Assessment  Pain Assessment: 0-10  Pain Level: 8  Patient's Stated Pain Goal: 4  Pain Type: Chronic pain  Pain Location: Back  Pain Orientation: Lower  Pain Descriptors: Discomfort;Aching  Pain Frequency: Continuous  Pain Onset: On-going  Functional Pain Assessment: Prevents or interferes some active activities and ADLs  Non-Pharmaceutical Pain Intervention(s): Emotional support;Repositioned  Response to Pain Intervention: Patient Satisfied  Vital Signs- See activity tolerance section above  Patient Currently in Pain: Yes  Pre Treatment Pain Screening  Intervention List: Patient able to continue with treatment    Orientation  Orientation  Overall Orientation Status: Within Normal Limits  Social/Functional History  Social/Functional History  Lives With: Family  Type of Home: House  Home Layout: One level  Home Access: Stairs to enter without rails(2 stairs, holds wall or door jam to enter)  Bathroom Shower/Tub: Walk-in shower  Bathroom Toilet: Handicap height  Bathroom Equipment: Built-in shower seat, Grab bars in shower  ADL Assistance: Independent  Ambulation Assistance: Independent(uses RW or w/c for locomotions)  Transfer Assistance: Independent    Objective     RLE AROM: WFL  LLE AROM : WFL  Strength : BLEs grossly 3+/5 at hips, 4/5 knees and ankles        Bed mobility  Supine to Sit: Modified independent  Sit to Supine: Modified independent  Comment: PT requested pt try bed mob without use of UEs as she will need to do this post CABG next week.  She could complete this with CG and cues  Transfers  Sit to Stand: Modified independent  Stand to sit: Modified independent  Comment: pt performed sit to and from stand without use of UEs from EOB  Ambulation  Surface: level tile  Device: Rolling Walker  Assistance: Stand by assistance  Quality of Gait: slow pace, mild flexed posture, therapist managed IV pole and wound vac, recipricol [id number]  Distance: 80 ft pacing in room              Plan   Plan  Times per week: 3-5 x week prior to surgery  Specific instructions for Next Treatment: ex, mobility, gait  Current Treatment Recommendations: Strengthening, Transfer Training, Endurance Training, Gait Training, Functional Mobility Training, Safety Education & Training, Home Exercise Program, Pain Management  Safety Devices  Type of devices:  All fall risk precautions in place, Gait belt, Bed alarm in place, Patient at risk for falls, Nurse notified, Call light within reach, Left in bed      AM-PAC Score 20/24 CJ     Goals  Short term goals  Time Frame for Short term goals: 1 week (5/4) unless otherwise specified  Short term goal 1: pt to perform supine to and from sit without use of UEs  Short term goal 2: Pt to transfer sit to and from stand without use of UEs- MET  Short term goal 3: pt to amb 150 ft with RW and supervision  Short term goal 4: pt to participate in LE Ex 10 x each by 4/30  Patient Goals   Patient goals : \"to get up and walk with RW, to start preparing for CABG surgery next wk\"       Therapy Time   Individual Concurrent Group Co-treatment   Time In 1505         Time Out 1535         Minutes 30         Timed Code Treatment Minutes: Mario Lopez PT

## 2021-04-27 NOTE — CONSULTS
Nutrition update: Will add Glucerna and Jimbo with meals to provide extra calories and protein in addition to the Cardiac, carb control 4 carb per meal eating % meals.     Thanks,   Michael Dang RD, LD

## 2021-04-27 NOTE — PROGRESS NOTES
CVTS Thoracic Progress Note:          CC: Multivessel CAD    Subj: awake, sitting up in bed, in NAD. Obj:    Blood pressure 133/68, pulse 69, temperature 98.8 °F (37.1 °C), temperature source Oral, resp. rate 20, height 5' 10\" (1.778 m), weight 160 lb (72.6 kg), SpO2 98 %. Lungs diminished   Abdomen soft, non-tender   UOP 1550 ml in 24 hrs; Cr 0.8    Diagnostics:   Recent Labs     21  0954 21  0527   WBC 4.3 3.9*   HGB 10.4* 9.2*   HCT 31.8* 28.1*    213                                                                  Recent Labs     21  0954 21  0855 21  0527   * 135* 135*   K 2.7* 3.3* 4.3   CL 93* 97* 102   CO2 27 28 25   BUN 13 11 10   CREATININE 0.9 0.9 0.8   GLUCOSE 331* 63* 169*     Recent Labs     21  0954   MG 1.70*      Pre-op testing:   Carotid dopplers: 21   Right Impression   The right internal carotid artery reveals a <50% diameter reducing stenosis. The right vertebral artery demonstrates normal antegrade flow. The right subclavian artery is visualized and demonstrates multiphasic flow. Left Impression   The left internal carotid artery reveals a <50% diameter reducing stenosis. The left vertebral artery demonstrates normal antegrade flow. The left subclavian artery is visualized and demonstrates multiphasic flow. There is no previous exam for comparison. Vein mappin21    Summary        1. There is no evidence of superficial venous thrombosis involving the right    BELOW the knee greater saphenous vein.    2. There is evidence of chronic partially occluding superficial venous    thrombosis involving the left greater saphenous vein at the mid calf segment    and distal calf segment. Echo: 21 with Dr. Jorge Shook    Summary:    Normal left ventricle systolic function with an estimated ejection fraction of 55%. No regional wall motion abnormalities are seen. Normal left ventricular diastolic filling pressure. Mild eccentric aortic regurgitation. Mild mitral and tricuspid regurgitation. Systolic pulmonary artery pressure (SPAP) is normal and estimated at 27 mmHg (right atrial pressure 3 mmHg).      CXR: 4/23/21   Impression   Bilateral parahilar linear opacities could represent pulmonary edema or   infection     PFT's: 4/27/21 pending     Assess/Plan:   Care per primary team    Multivessel CAD:   Continue with pre-op testing and metabolization of Brilinta  Pt on heparin drip   PT/OT to see patient for therapy prior to surgery (optimize)  Will plan for the OR Monday 5/3/21.    __________________________________________________________    Bryanna Peralta, CNP  4/27/2021  2:00 PM

## 2021-04-27 NOTE — PROGRESS NOTES
Michael   Daily Cardiovascular Progress Note    Admit Date: 4/24/2021    Chief complaint: elevated trop  HPI: no Cp      Medications/Labs all Reviewed:  Patient Active Problem List   Diagnosis    [facility] arthritis [facility]    Psoriasis    GERD (gastroesophageal reflux disease)    Anemia    Cylindrical bronchiectasis ([facility]    Tracheobronchomalacia    [facility] state [facility]    Hypokalemia    Coronary artery disease    Stasis edema of both lower extremities    Essential hypertension, benign    Lumbar spondylosis    Mitral valve insufficiency and aortic valve insufficiency    Mixed hyperlipidemia    Myopia of both eyes    Osteoporosis    Other chronic sinusitis    Primary open angle glaucoma (POAG) of both eyes, mild stage    Primary osteoarthritis of right hip    Type 2 diabetes mellitus with diabetic polyneuropathy, with long-term current use of insulin ([facility])    Type 2 diabetes mellitus with unspecified diabetic retinopathy without macular edema ([facility])    Uncontrolled type 2 diabetes mellitus with diabetic peripheral angiopathy without gangrene, with long-term current use of insulin ([facility])    Pressure ulcer of coccygeal region, stage 4 ([facility]    NSTEMI (non-ST elevated myocardial infarction) ([facility])       Medications:  cefTRIAXone (ROCEPHIN) 1000 mg IVPB in 50 mL D5W minibag, Daily  dextrose 5 % solution, PRN  hydrALAZINE (APRESOLINE) injection 10 mg, Q10 Min PRN  vashe wound therapy external solution, Continuous PRN  gabapentin (NEURONTIN) capsule 300 mg, BID  oxyCODONE-acetaminophen (PERCOCET)  MG per tablet 1 tablet, BID PRN  morphine (MS CONTIN) extended release tablet 15 mg, 2 times per day  albuterol sulfate  (90 Base) MCG/ACT inhaler 2 puff, Q4H PRN  aspirin EC tablet 81 mg, Daily  atorvastatin (LIPITOR) tablet 40 mg, Nightly  cyclobenzaprine (FLEXERIL) tablet 10 mg, BID PRN  DULoxetine (CYMBALTA) extended release capsule 60 mg, Daily  insulin glargine (LANTUS) injection vial 30 Units, Nightly  metoprolol succinate (TOPROL XL) extended release tablet 25 mg, Daily  losartan (COZAAR) tablet 50 mg, Daily  pantoprazole (PROTONIX) tablet 40 mg, Daily  Roflumilast (DALIRESP) tablet 500 mcg, Nightly  torsemide (DEMADEX) tablet 40 mg, Daily  heparin (porcine) injection 2,000 Units, PRN  heparin (porcine) injection 4,000 Units, PRN  sodium chloride flush 0.9 % injection 5-40 mL, 2 times per day  sodium chloride flush 0.9 % injection 5-40 mL, PRN  0.9 % sodium chloride infusion, PRN  promethazine (PHENERGAN) tablet 12.5 mg, Q6H PRN    Or  ondansetron (ZOFRAN) injection 4 mg, Q6H PRN  acetaminophen (TYLENOL) tablet 650 mg, Q6H PRN    Or  acetaminophen (TYLENOL) suppository 650 mg, Q6H PRN  polyethylene glycol (GLYCOLAX) packet 17 g, Daily PRN  perflutren lipid microspheres (DEFINITY) injection 1.65 mg, ONCE PRN  nitroGLYCERIN (NITROSTAT) SL tablet 0.4 mg, Q5 Min PRN  glucose (GLUTOSE) 40 % oral gel 15 g, PRN  dextrose 50 % IV solution, PRN  glucagon (rDNA) injection 1 mg, PRN  insulin lispro (HUMALOG) injection vial 0-18 Units, TID WC  insulin lispro (HUMALOG) injection vial 0-9 Units, Nightly  heparin 25,000 units in dextrose 5% 250 mL (premix) infusion, Continuous           PHYSICAL EXAM   BP (!) 121/58   Pulse 70   Temp 97.9 °F (36.6 °C) (Oral)   Resp 18   Ht 5' 10\" (1.778 m)   Wt 160 lb (72.6 kg)   SpO2 93%   BMI 22.96 kg/m²    Vitals:    04/26/21 1945 04/26/21 2307 04/27/21 0427 04/27/21 0741   BP: 127/65 (!) 146/64 (!) 122/57 (!) 121/58   Pulse: 73 85 76 70   Resp: 16 16 16 18   Temp: 99 °F (37.2 °C) 98.9 °F (37.2 °C) 98 °F (36.7 °C) 97.9 °F (36.6 °C)   TempSrc: Oral Oral Oral Oral   SpO2: 99% 96% 94% 93%   Weight:       Height:             Intake/Output Summary (Last 24 hours) at 4/27/2021 0916  Last data filed at 4/27/2021 0905  Gross per 24 hour   Intake 360 ml   Output 1500 ml   Net -1140 ml     Wt Readings from Last 3 Encounters:   04/26/21 160 lb (72.6 kg) 04/23/21 164 lb (74.4 kg)   04/21/21 164 lb (74.4 kg)         Gen: Patient in NAD, resting comfortably  Neck: No JVD or bruits  Respiratory: CTAB no WRR  Chest: normal without deformity  Cardiovascular:RRR, S1S2, no mrg, normal PMI  Abdomen: Soft, NTND, Normal BS  Extremities: No clubbing, cyanosis, or edema  Neurological/Psychiatric: AxO x4, No gross motors/sensory deficits  Skin:  Warm and dry      Labs:  CBC:   Recent Labs     04/25/21  0954 04/27/21  0527   WBC 4.3 3.9*   HGB 10.4* 9.2*   HCT 31.8* 28.1*   MCV 83.9 84.5    213     BMP:   Recent Labs     04/25/21  0954 04/26/21  0855 04/27/21  0527   * 135* 135*   K 2.7* 3.3* 4.3   CL 93* 97* 102   CO2 27 28 25   PHOS  --  2.5 3.4   BUN 13 11 10   CREATININE 0.9 0.9 0.8     MG:    Recent Labs     04/25/21  0954   MG 1.70*      PT/INR: No results for input(s): PROTIME, INR in the last 72 hours. APTT:   Recent Labs     04/26/21  0611 04/26/21  2253 04/27/21  0527   APTT 55.2* 53.4* 66.9*     Cardiac Enzymes:   No results for input(s): CKTOTAL, CKMB, CKMBINDEX, TROPONINI in the last 72 hours. Cardiac Studies:          Assessment and Plan     1. NSTEMI:   2. CAD: critical CAD  3. Normocytic anemia  4. Hypokalemia: resolved  5. DM2: uncontrolled at 7.8 A1c        PLAN  1. CT surgery planing  2. Holding Ticagrelor for CABG   - Cont heparin  3. Cont ASA, lipitor, cozaar, toprol  4. Normal aldosterone points to possible low total body K stores, will replete prn and follow  5.  Anemia w/u per IM       Patient Active Problem List   Diagnosis    Rheumatoid arthritis ([address]    Psoriasis    GERD (gastroesophageal reflux disease)    Anemia    Cylindrical bronchiectasis ([address]    Tracheobronchomalacia    Immunocompromised state ([address]    Hypokalemia    Coronary artery disease    Stasis edema of both lower extremities    Essential hypertension, benign    Lumbar spondylosis    Mitral valve insufficiency and aortic valve insufficiency    Mixed hyperlipidemia    Myopia of both eyes    Osteoporosis    Other chronic sinusitis    Primary open angle glaucoma (POAG) of both eyes, mild stage    Primary osteoarthritis of right hip    Type 2 diabetes mellitus with diabetic polyneuropathy, with long-term current use of insulin (HCC)    Type 2 diabetes mellitus with unspecified diabetic retinopathy without macular edema (HCC)    Uncontrolled type 2 diabetes mellitus with diabetic peripheral angiopathy without gangrene, with long-term current use of insulin (HCC)    Pressure ulcer of coccygeal region, stage 4 ([facility])    NSTEMI (non-ST elevated myocardial infarction) ([facility]         Thank you for allowing me to participate in the care of your patient. Please call me with any questions [phone].       Joe Coreas MD, [address] [facility] Cardiologist  [facility]  [phone] [facility]  [phone] [address] [place] [address]  4/27/2021 9:16 AM

## 2021-04-27 NOTE — CARE COORDINATION
CASE MANAGEMENT INITIAL ASSESSMENT      Reviewed chart and completed assessment via telephone with:  Explained Case Management role/services. To patient    Primary contact information:see below    Health Care Decision Maker :   Primary Decision Maker: Mario Betancourt - [phone]    Secondary Decision Maker: Shaheen Isidro - Child - [phone]          Can this person be reached and be able to respond quickly, such as within a few minutes or hours? Yes  Who would be your back-up decision maker? Name Therese Hollingsworth granddaughter    Admit date/status:Inpatient 4/24/2021  Diagnosis:NSTEMI  Is this a Readmission?:  No      Insurance:[facility] Medicare   Precert required for SNF: Yes       3 night stay required: No    Living arrangements, Adls, care needs, prior to admission:Lives at home and granddaughter Morenita lives with her and is there most of the day except when she is at school. Per patient she gets home around 11:00 AM so patient is only alone a couple of hours in the morning. Transportation:TBD    Durable Medical Equipment at home:  Walker_x_Cane__RTS__ BSC__Shower Chair__  02__ HHN__ CPAP__  BiPap__  Hospital Bed__ W/C__x_ Other____x rollator______    Services in the home and/or outpatient, prior to admission:Active with [facility]    PT/OT recs:Not seen yet    Hospital Exemption Notification (HEN):N/A    Barriers to discharge:None identified at present    Plan/comments:Patient states she has a home wound vac through [address] [place] [address] Road for a coccyx wound and Tex Willett is the [place] [address] providing nursing. Dennis Murguia at [address] [place] [address] Road at [phone]. Per [facility] patient okay to keep home wound vac when discharged if she goes back home. She would just need to be notified if patient goes to IP rehab or SNF at discharge. Patient had heart cath that revealed multivessel disease and the plan is for CABG possibly early next week, date not yet confirmed for surgery.  Case Management will follow and assist with the discharge plan and disposition.      ECOC on chart for MD signature

## 2021-04-27 NOTE — FLOWSHEET NOTE
04/27/21 1330   Encounter Summary   Services provided to: Patient and family together   Referral/Consult From: Patient   Support System Children   Continue Visiting   (4/27 Follow-up for Communion)   Complexity of Encounter Moderate   Length of Encounter 30 minutes   Spiritual/Pentecostalism   Type Spiritual support   Assessment Approachable;Peaceful   Intervention Communion;Prayer; Active listening;Explored feelings, thoughts, concerns; Discussed relationship with God   Outcome Expressed gratitude;Engaged in conversation; Shared life review; Hopeful   Sacraments   Communion Family received communion;Patient received communion

## 2021-04-28 ENCOUNTER — HOSPITAL ENCOUNTER (OUTPATIENT)
Dept: WOUND CARE | Age: 71
Discharge: HOME | End: 2021-04-28
Payer: MEDICARE

## 2021-04-28 ENCOUNTER — ANESTHESIA EVENT (OUTPATIENT)
Dept: OPERATING ROOM | Age: 71
DRG: 233 | End: 2021-04-28
Payer: MEDICARE

## 2021-04-28 DIAGNOSIS — L89.154 PRESSURE ULCER OF COCCYGEAL REGION, STAGE 4 (HCC): ICD-10-CM

## 2021-04-28 LAB
ALBUMIN SERPL-MCNC: 2.6 G/DL (ref 3.4–5)
ANION GAP SERPL CALCULATED.3IONS-SCNC: 9 MMOL/L (ref 3–16)
APTT: 48.4 SEC (ref 24.2–36.2)
APTT: 53.7 SEC (ref 24.2–36.2)
APTT: 95.2 SEC (ref 24.2–36.2)
BLOOD CULTURE, ROUTINE: NORMAL
BUN BLDV-MCNC: 14 MG/DL (ref 7–20)
CALCIUM SERPL-MCNC: 8.4 MG/DL (ref 8.3–10.6)
CHLORIDE BLD-SCNC: 96 MMOL/L (ref 99–110)
CO2: 26 MMOL/L (ref 21–32)
CREAT SERPL-MCNC: 1 MG/DL (ref 0.6–1.2)
CULTURE, BLOOD 2: NORMAL
GFR AFRICAN AMERICAN: >60
GFR NON-AFRICAN AMERICAN: 55
GLUCOSE BLD-MCNC: 180 MG/DL (ref 70–99)
GLUCOSE BLD-MCNC: 187 MG/DL (ref 70–99)
GLUCOSE BLD-MCNC: 246 MG/DL (ref 70–99)
GLUCOSE BLD-MCNC: 57 MG/DL (ref 70–99)
GLUCOSE BLD-MCNC: 62 MG/DL (ref 70–99)
GLUCOSE BLD-MCNC: 72 MG/DL (ref 70–99)
GLUCOSE BLD-MCNC: 84 MG/DL (ref 70–99)
GLUCOSE BLD-MCNC: 98 MG/DL (ref 70–99)
HCT VFR BLD CALC: 28.4 % (ref 36–48)
HEMOGLOBIN: 9.4 G/DL (ref 12–16)
MAGNESIUM: 1.8 MG/DL (ref 1.8–2.4)
MCH RBC QN AUTO: 28 PG (ref 26–34)
MCHC RBC AUTO-ENTMCNC: 33 G/DL (ref 31–36)
MCV RBC AUTO: 84.7 FL (ref 80–100)
PDW BLD-RTO: 15.7 % (ref 12.4–15.4)
PERFORMED ON: ABNORMAL
PERFORMED ON: NORMAL
PERFORMED ON: NORMAL
PHOSPHORUS: 3.8 MG/DL (ref 2.5–4.9)
PLATELET # BLD: 205 K/UL (ref 135–450)
PMV BLD AUTO: 7.7 FL (ref 5–10.5)
POTASSIUM SERPL-SCNC: 4.3 MMOL/L (ref 3.5–5.1)
RBC # BLD: 3.36 M/UL (ref 4–5.2)
SODIUM BLD-SCNC: 131 MMOL/L (ref 136–145)
WBC # BLD: 6.2 K/UL (ref 4–11)

## 2021-04-28 PROCEDURE — 2580000003 HC RX 258: Performed by: INTERNAL MEDICINE

## 2021-04-28 PROCEDURE — 80069 RENAL FUNCTION PANEL: CPT

## 2021-04-28 PROCEDURE — 6370000000 HC RX 637 (ALT 250 FOR IP): Performed by: INTERNAL MEDICINE

## 2021-04-28 PROCEDURE — 97530 THERAPEUTIC ACTIVITIES: CPT

## 2021-04-28 PROCEDURE — 85730 THROMBOPLASTIN TIME PARTIAL: CPT

## 2021-04-28 PROCEDURE — 97116 GAIT TRAINING THERAPY: CPT

## 2021-04-28 PROCEDURE — 2060000000 HC ICU INTERMEDIATE R&B

## 2021-04-28 PROCEDURE — 85027 COMPLETE CBC AUTOMATED: CPT

## 2021-04-28 PROCEDURE — 99232 SBSQ HOSP IP/OBS MODERATE 35: CPT | Performed by: NURSE PRACTITIONER

## 2021-04-28 PROCEDURE — 83735 ASSAY OF MAGNESIUM: CPT

## 2021-04-28 PROCEDURE — 97535 SELF CARE MNGMENT TRAINING: CPT

## 2021-04-28 PROCEDURE — 6360000002 HC RX W HCPCS: Performed by: INTERNAL MEDICINE

## 2021-04-28 PROCEDURE — 97166 OT EVAL MOD COMPLEX 45 MIN: CPT

## 2021-04-28 PROCEDURE — 97110 THERAPEUTIC EXERCISES: CPT

## 2021-04-28 PROCEDURE — 36415 COLL VENOUS BLD VENIPUNCTURE: CPT

## 2021-04-28 RX ORDER — HEPARIN SODIUM 10000 [USP'U]/100ML
1150 INJECTION, SOLUTION INTRAVENOUS CONTINUOUS
Status: DISCONTINUED | OUTPATIENT
Start: 2021-04-28 | End: 2021-05-03

## 2021-04-28 RX ORDER — INSULIN GLARGINE 100 [IU]/ML
20 INJECTION, SOLUTION SUBCUTANEOUS NIGHTLY
Status: DISCONTINUED | OUTPATIENT
Start: 2021-04-28 | End: 2021-05-01

## 2021-04-28 RX ORDER — TORSEMIDE 20 MG/1
20 TABLET ORAL DAILY
Status: DISCONTINUED | OUTPATIENT
Start: 2021-04-29 | End: 2021-05-03

## 2021-04-28 RX ORDER — LATANOPROST 50 UG/ML
1 SOLUTION/ DROPS OPHTHALMIC DAILY
Status: DISCONTINUED | OUTPATIENT
Start: 2021-04-28 | End: 2021-05-10 | Stop reason: HOSPADM

## 2021-04-28 RX ADMIN — INSULIN LISPRO 3 UNITS: 100 INJECTION, SOLUTION INTRAVENOUS; SUBCUTANEOUS at 13:06

## 2021-04-28 RX ADMIN — LOSARTAN POTASSIUM 50 MG: 25 TABLET, FILM COATED ORAL at 09:40

## 2021-04-28 RX ADMIN — CEFTRIAXONE SODIUM 1000 MG: 1 INJECTION, POWDER, FOR SOLUTION INTRAMUSCULAR; INTRAVENOUS at 09:41

## 2021-04-28 RX ADMIN — METOPROLOL SUCCINATE 25 MG: 25 TABLET, EXTENDED RELEASE ORAL at 09:41

## 2021-04-28 RX ADMIN — ROFLUMILAST 500 MCG: 500 TABLET ORAL at 20:13

## 2021-04-28 RX ADMIN — MORPHINE SULFATE 15 MG: 15 TABLET, FILM COATED, EXTENDED RELEASE ORAL at 20:05

## 2021-04-28 RX ADMIN — HEPARIN SODIUM 1000 UNITS/HR: 10000 INJECTION, SOLUTION INTRAVENOUS at 09:44

## 2021-04-28 RX ADMIN — GABAPENTIN 300 MG: 300 CAPSULE ORAL at 20:05

## 2021-04-28 RX ADMIN — SODIUM CHLORIDE, PRESERVATIVE FREE 10 ML: 5 INJECTION INTRAVENOUS at 09:41

## 2021-04-28 RX ADMIN — ATORVASTATIN CALCIUM 40 MG: 40 TABLET, FILM COATED ORAL at 20:05

## 2021-04-28 RX ADMIN — OXYCODONE AND ACETAMINOPHEN 1 TABLET: 10; 325 TABLET ORAL at 03:19

## 2021-04-28 RX ADMIN — LATANOPROST 1 DROP: 50 SOLUTION OPHTHALMIC at 09:40

## 2021-04-28 RX ADMIN — GABAPENTIN 300 MG: 300 CAPSULE ORAL at 09:40

## 2021-04-28 RX ADMIN — ASPIRIN 81 MG: 81 TABLET, COATED ORAL at 09:40

## 2021-04-28 RX ADMIN — DULOXETINE HYDROCHLORIDE 60 MG: 60 CAPSULE, DELAYED RELEASE ORAL at 09:41

## 2021-04-28 RX ADMIN — SODIUM CHLORIDE, PRESERVATIVE FREE 10 ML: 5 INJECTION INTRAVENOUS at 20:05

## 2021-04-28 RX ADMIN — PANTOPRAZOLE SODIUM 40 MG: 40 TABLET, DELAYED RELEASE ORAL at 09:40

## 2021-04-28 RX ADMIN — INSULIN GLARGINE 20 UNITS: 100 INJECTION, SOLUTION SUBCUTANEOUS at 20:14

## 2021-04-28 RX ADMIN — TORSEMIDE 40 MG: 20 TABLET ORAL at 09:40

## 2021-04-28 RX ADMIN — MORPHINE SULFATE 15 MG: 15 TABLET, FILM COATED, EXTENDED RELEASE ORAL at 09:40

## 2021-04-28 RX ADMIN — INSULIN LISPRO 4 UNITS: 100 INJECTION, SOLUTION INTRAVENOUS; SUBCUTANEOUS at 16:44

## 2021-04-28 RX ADMIN — OXYCODONE AND ACETAMINOPHEN 1 TABLET: 10; 325 TABLET ORAL at 15:17

## 2021-04-28 ASSESSMENT — PAIN SCALES - GENERAL
PAINLEVEL_OUTOF10: 6
PAINLEVEL_OUTOF10: 6
PAINLEVEL_OUTOF10: 5
PAINLEVEL_OUTOF10: 6
PAINLEVEL_OUTOF10: 6
PAINLEVEL_OUTOF10: 10
PAINLEVEL_OUTOF10: 6
PAINLEVEL_OUTOF10: 5

## 2021-04-28 ASSESSMENT — PAIN DESCRIPTION - ONSET: ONSET_2: ON-GOING

## 2021-04-28 ASSESSMENT — PAIN DESCRIPTION - DESCRIPTORS
DESCRIPTORS: ACHING
DESCRIPTORS: ACHING;DISCOMFORT
DESCRIPTORS: ACHING;DISCOMFORT
DESCRIPTORS: ACHING

## 2021-04-28 ASSESSMENT — PAIN DESCRIPTION - PROGRESSION
CLINICAL_PROGRESSION: NOT CHANGED

## 2021-04-28 ASSESSMENT — PAIN DESCRIPTION - PAIN TYPE
TYPE: CHRONIC PAIN;ACUTE PAIN
TYPE: ACUTE PAIN;CHRONIC PAIN
TYPE: ACUTE PAIN
TYPE: CHRONIC PAIN
TYPE: ACUTE PAIN;CHRONIC PAIN

## 2021-04-28 ASSESSMENT — PAIN DESCRIPTION - LOCATION
LOCATION: BUTTOCKS
LOCATION: BACK;BUTTOCKS
LOCATION_2: BACK

## 2021-04-28 ASSESSMENT — PAIN DESCRIPTION - FREQUENCY: FREQUENCY: CONTINUOUS

## 2021-04-28 ASSESSMENT — PAIN DESCRIPTION - ORIENTATION
ORIENTATION: LOWER

## 2021-04-28 ASSESSMENT — PAIN DESCRIPTION - INTENSITY: RATING_2: 4

## 2021-04-28 ASSESSMENT — PAIN - FUNCTIONAL ASSESSMENT: PAIN_FUNCTIONAL_ASSESSMENT: PREVENTS OR INTERFERES SOME ACTIVE ACTIVITIES AND ADLS

## 2021-04-28 ASSESSMENT — PAIN DESCRIPTION - DURATION: DURATION_2: CONTINUOUS

## 2021-04-28 NOTE — PROGRESS NOTES
Na 131 today, mild increase in BUN and Creat  7. Anemia: stable  8. Sacral ulcer, stage IV:      PLAN:  1. Decrease torsemide from 40 mg to 20 mg daily  2. Continue current doses of Toprol and Losartan  3. Continue ASA and statin  4. Continue IV Heparin until CABG  5. PT/OT (in process)  6.  Improve nutrition    LORENZO Rodriguez - CNP, 4/28/2021, 1:17 PM  [employer]   [phone]       Telemetry: SR 60-80  NYHA: III    Physical Exam:  General:  Awake, alert, NAD  Skin:  Warm and dry  Neck:  JVP normal  Chest: Clear to auscultation  Cardiovascular:  RRR, normal S1S2, no MRG  Abdomen:  Soft, nontender, +bowel sounds  Extremities: Trace BLE nonpitting edema  right radial site without ooze, bruise or hematoma, dressing C,D,I, 2+ pulse      Medications:    latanoprost  1 drop Both Eyes Daily    cefTRIAXone (ROCEPHIN) IV  1,000 mg Intravenous Daily    gabapentin  300 mg Oral BID    morphine  15 mg Oral 2 times per day    aspirin EC  81 mg Oral Daily    atorvastatin  40 mg Oral Nightly    DULoxetine  60 mg Oral Daily    insulin glargine  30 Units Subcutaneous Nightly    metoprolol succinate  25 mg Oral Daily    losartan  50 mg Oral Daily    pantoprazole  40 mg Oral Daily    Roflumilast  500 mcg Oral Nightly    torsemide  40 mg Oral Daily    sodium chloride flush  5-40 mL Intravenous 2 times per day    insulin lispro  0-18 Units Subcutaneous TID WC    insulin lispro  0-9 Units Subcutaneous Nightly      heparin (PORCINE) Infusion 1,000 Units/hr (04/28/21 0944)    dextrose 100 mL/hr (04/26/21 0459)    vashe wound therapy      sodium chloride 25 mL (04/24/21 2037)       Lab Data: Lab results independently reviewed and analyzed by myself 4/28/21   CBC:   Recent Labs     04/27/21  0527 04/28/21  0555   WBC 3.9* 6.2   HGB 9.2* 9.4*    205     BMP:    Recent Labs     04/26/21  0855 04/27/21  0527 04/28/21  0555   * 135* 131*   K 3.3* 4.3 4.3   CO2 28 25 26   BUN 11 10 14 CREATININE 0.9 0.8 1.0     INR:  No results for input(s): INR in the last 72 hours. BNP:  No results for input(s): PROBNP in the last 72 hours. Cardiac Enzymes: No results for input(s): TROPONINI in the last 72 hours. Lipids:   Lab Results   Component Value Date    TRIG 110 04/24/2021    TRIG 65 07/22/2010    HDL 30 04/24/2021    HDL 43 07/22/2010    LDLCALC 29 04/24/2021    LDLCALC 106 07/22/2010       Cardiac Imaging:    ECHO: 4/24/21 with Dr. Luc Kenney    Summary:    Normal left ventricle systolic function with an estimated ejection fraction of 55%. No regional wall motion abnormalities are seen. Normal left ventricular diastolic filling pressure. Mild eccentric aortic regurgitation. Mild mitral and tricuspid regurgitation. Systolic pulmonary artery pressure (SPAP) is normal and estimated at 27 mmHg (right atrial pressure 3 mmHg). CARDIAC CATH 4/26/2021:  Procedures Performed:   1. Left heart catheterization  2. Selective left and right coronary angiogram  3. Left ventriculography    Procedure Findings:  1. Severe multi vessel coronary artery diease              ~not amenable to PCI  2. Normal left ventricular function with EF estimated at 55-60%  3. Normal left heart hemodynamics   Findings:   1. Left main coronary artery distal 70%. It gave off the left anterior descending artery and left circumflex. 2. Left anterior descending artery has severe atherosclerotic disease. 70% ostial. It was moderate in size. It gave off septal perforators and a moderate sized diagonal branch. The LAD covered the entire apex of the left ventricle. 3. Left circumflex has severe atherosclerotic disease. 99% ostial. It was moderate in size. There was a moderate sized obtuse marginal branch. ~OM1    4. Right coronary artery has severe atherosclerotic disease. It was moderate in size and was the dominant artery. ~100% distal PDA    5. Left ventriculogram showed normal LVEF at 50%. Wall motion was normal . There was no significant mitral valve or aortic valve disease noted. LVEDP was normal. There was no gradient noted across the aortic valve during pullback of the catheter. CONCLUSIONS:   1. Severe multi-vessel coronary artery disease   ASSESSMENT/RECOMMENDATIONS:   1.  CABG consult

## 2021-04-28 NOTE — PROGRESS NOTES
Mild eccentric aortic regurgitation. Mild mitral and tricuspid regurgitation. Systolic pulmonary artery pressure (SPAP) is normal and estimated at 27 mmHg (right atrial pressure 3 mmHg). CXR: 4/23/21   Impression   Bilateral parahilar linear opacities could represent pulmonary edema or   infection     PFT's: 4/27/21 completed, on chart. Assess/Plan:   Care per primary team    Multivessel CAD:   Continue with pre-op testing and metabolization of Brilinta  Pt on heparin drip   PT/OT working with patient to help build strength prior to surgery. Will continue to follow post-op.    Will plan for the OR Monday 5/3/21.  __________________________________________________________    Kathleen Epps, CNP  4/28/2021  10:16 AM

## 2021-04-28 NOTE — PROGRESS NOTES
Occupational Therapy   Occupational Therapy Initial Assessment and treatment    Date: 2021   Patient Name: Julio Paul  MRN: [id number]     : 1950    Date of Service: 2021    Discharge Recommendations:  24 hour supervision or assist, Home with Home health OT(but will cont to assess s/p CABG next week)    If pt discharges prior to next session, this note will serve as discharge summary. See case management note for discharge disposition. Assessment   Performance deficits / Impairments: Decreased functional mobility ; Decreased ADL status; Decreased strength;Decreased safe awareness;Decreased cognition;Decreased endurance;Decreased balance  Assessment: Pt very pleasant and cooperative, admitted with NSTEMI, with plans for CABG next week. Pt currently CGA for transfers/mobility pushing IV pole, min A LB dressing. Pt tolerates standing at sink for washing face/hands, oral care and hair care. Pt left in chair with waffle air cushion, left with needs. OT Education: OT Role;Plan of Care;ADL Adaptive Strategies;Transfer Training  Patient Education: disease specific: OT role, pressure relief, importance of mobility  REQUIRES OT FOLLOW UP: Yes  Activity Tolerance  Activity Tolerance: Patient Tolerated treatment well  Activity Tolerance: /52 HR 71, O2 98% on RA  Safety Devices  Safety Devices in place: Yes  Type of devices: Gait belt;Left in chair;Call light within reach           Patient Diagnosis(es): There were no encounter diagnoses.      has a past medical history of Atherosclerosis of native artery of right lower extremity with rest pain ([address] Back pain, Branch retinal vein occlusion, Bronchiectasis with acute exacerbation (HCC), Closed compression fracture of thoracic vertebra ([address] Closed fracture of facial bone with routine healing, Closed jaw fracture ([address] Community acquired pneumonia of left lower lobe of lung, Compression fracture of L1 lumbar vertebra ([address] COPD (chronic Intervention: Patient Satisfied  Vital Signs  Temp: 99.2 °F (37.3 °C)  Temp Source: Oral  Pulse: 71  Heart Rate Source: Monitor  Resp: 16  BP: 118/63  BP Location: Left upper arm  MAP (mmHg): 81  Patient Position: Sitting  Level of Consciousness: Alert (0)  MEWS Score: 1  Patient Currently in Pain: Yes  Oxygen Therapy  SpO2: 97 %  O2 Device: None (Room air)  Social/Functional History  Social/Functional History  Lives With: Family  Type of Home: House  Home Layout: One level  Home Access: Stairs to enter without rails  Entrance Stairs - Number of Steps: 2 stairs, holds onto wall or door jam to enter  [place] Shower/Tub: Walk-in shower  Bathroom Toilet: Handicap height  Bathroom Equipment: Built-in shower seat, Grab bars in shower  ADL Assistance: Independent  Ambulation Assistance: Independent(RW or W/c)  Transfer Assistance: Independent  Leisure & Hobbies: being outside, grandchildren       Objective   Vision: Impaired  Vision Exceptions: Wears glasses for reading  Hearing: Within functional limits    Orientation  Overall Orientation Status: Within Functional Limits     Balance  Sitting Balance: Independent  Standing Balance: Contact guard assistance  Standing Balance  Time: >5 min  Activity: mobility in room, to/from restroom and standing at sink for grooming tasks  Functional Mobility  Functional - Mobility Device: Other  Activity: To/from bathroom  Assist Level: Contact guard assistance  Functional Mobility Comments: pt able to push IV pole  Toilet Transfers  Toilet - Technique: Ambulating  Equipment Used: Standard toilet  Toilet Transfer: Contact guard assistance  ADL  Feeding: Independent  Grooming: Stand by assistance(standing at sink)  LE Dressing: Minimal assistance  Toileting: Contact guard assistance  Additional Comments: pt tolerates standing at sink for grooming tasks  Tone RUE  RUE Tone: Normotonic  Tone LUE  LUE Tone: Normotonic  Coordination  Movements Are Fluid And Coordinated:  Yes Transfers  Sit to stand: Contact guard assistance  Stand to sit: Contact guard assistance     Cognition  Overall Cognitive Status: WFL                 LUE AROM (degrees)  LUE AROM : WFL  Left Hand AROM (degrees)  Left Hand AROM: WFL  RUE AROM (degrees)  RUE AROM : WFL  Right Hand AROM (degrees)  Right Hand AROM: WFL  LUE Strength  Gross LUE Strength: WFL  RUE Strength  Gross RUE Strength: WFL        Plan   Plan  Times per week: 3-5x/wk      AM-PAC Score        AM-[facility] Inpatient Daily Activity Raw Score: 20 (04/28/21 1406)  AM-PAC Inpatient ADL T-Scale Score : 42.03 (04/28/21 1406)  ADL Inpatient CMS 0-100% Score: 38.32 (04/28/21 1406)  ADL Inpatient CMS G-Code Modifier : CJ (04/28/21 1406)    Goals  Short term goals  Time Frame for Short term goals: 1 week by 5/5  Short term goal 1: Pt will complete LB dressing with SPV  Short term goal 2: Pt will complete functional transfers with SPV  Short term goal 3: Pt will tolerate B UE ex x 20 reps w/o fatigue  Short term goal 4: Pt will complete toileting with SPV  Patient Goals   Patient goals : \"get back to my life after surgery\"       Therapy Time   Individual Concurrent Group Co-treatment   Time In 1139         Time Out 1215         Minutes 36         Timed Code Treatment Minutes: 26 Minutes(10 min eval)       Sheila Dalal, OT

## 2021-04-28 NOTE — PROGRESS NOTES
Hospitalist Progress Note      PCP: Erlinda Black MD    Date of Admission: 4/24/2021    Chief Complaint: Chest Pain    Subjective: no new c/o. Medications:  Reviewed    Infusion Medications    dextrose 100 mL/hr (04/26/21 0459)    vashe wound therapy      sodium chloride 25 mL (04/24/21 2037)    heparin (PORCINE) Infusion 13 mL/hr (04/27/21 1238)     Scheduled Medications    latanoprost  1 drop Both Eyes Daily    cefTRIAXone (ROCEPHIN) IV  1,000 mg Intravenous Daily    gabapentin  300 mg Oral BID    morphine  15 mg Oral 2 times per day    aspirin EC  81 mg Oral Daily    atorvastatin  40 mg Oral Nightly    DULoxetine  60 mg Oral Daily    insulin glargine  30 Units Subcutaneous Nightly    metoprolol succinate  25 mg Oral Daily    losartan  50 mg Oral Daily    pantoprazole  40 mg Oral Daily    Roflumilast  500 mcg Oral Nightly    torsemide  40 mg Oral Daily    sodium chloride flush  5-40 mL Intravenous 2 times per day    insulin lispro  0-18 Units Subcutaneous TID WC    insulin lispro  0-9 Units Subcutaneous Nightly     PRN Meds: dextrose, hydrALAZINE, vashe wound therapy, oxyCODONE-acetaminophen, albuterol sulfate HFA, cyclobenzaprine, heparin (porcine), heparin (porcine), sodium chloride flush, sodium chloride, promethazine **OR** ondansetron, acetaminophen **OR** acetaminophen, polyethylene glycol, perflutren lipid microspheres, nitroGLYCERIN, glucose, dextrose, glucagon (rDNA)      Intake/Output Summary (Last 24 hours) at 4/28/2021 0827  Last data filed at 4/28/2021 0603  Gross per 24 hour   Intake 1388.25 ml   Output 3050 ml   Net -1661.75 ml       Physical Exam Performed:    /60   Pulse 76   Temp 98.8 °F (37.1 °C) (Oral)   Resp 20   Ht 5' 10\" (1.778 m)   Wt 165 lb 5.5 oz (75 kg)   SpO2 96%   BMI 23.72 kg/m²     General appearance: No apparent distress, appears stated age and cooperative. HEENT: Pupils equal, round, and reactive to light.  Conjunctivae/corneas clear.  Neck: Supple, with full range of motion. No jugular venous distention. Trachea midline. Respiratory:  Normal respiratory effort. Clear to auscultation, bilaterally without Rales/Wheezes/Rhonchi. Cardiovascular: Regular rate and rhythm with normal S1/S2 without murmurs, rubs or gallops. Abdomen: Soft, non-tender, non-distended with normal bowel sounds. Musculoskeletal: No clubbing, cyanosis or edema bilaterally. Full range of motion without deformity. Skin: Skin color, texture, turgor normal.  No rashes or lesions. Neurologic:  Neurovascularly intact without any focal sensory/motor deficits. Cranial nerves: II-XII intact, grossly non-focal.  Psychiatric: Alert and oriented, thought content appropriate, normal insight  Capillary Refill: Brisk,< 3 seconds   Peripheral Pulses: +2 palpable, equal bilaterally       Labs:   Recent Labs     04/25/21  0954 04/27/21  0527 04/28/21  0555   WBC 4.3 3.9* 6.2   HGB 10.4* 9.2* 9.4*   HCT 31.8* 28.1* 28.4*    213 205     Recent Labs     04/26/21  0855 04/27/21  0527 04/28/21  0555   * 135* 131*   K 3.3* 4.3 4.3   CL 97* 102 96*   CO2 28 25 26   BUN 11 10 14   CREATININE 0.9 0.8 1.0   CALCIUM 8.6 8.4 8.4   PHOS 2.5 3.4 3.8     No results for input(s): AST, ALT, BILIDIR, BILITOT, ALKPHOS in the last 72 hours. No results for input(s): INR in the last 72 hours. No results for input(s): Dania Councilman in the last 72 hours.     Urinalysis:      Lab Results   Component Value Date    NITRU Negative 04/26/2021    WBCUA 0-2 04/26/2021    BACTERIA 1+ 04/23/2021    RBCUA 3-4 04/26/2021    BLOODU TRACE-INTACT 04/26/2021    SPECGRAV 1.010 04/26/2021    GLUCOSEU Negative 04/26/2021    GLUCOSEU NEGATIVE 03/20/2010       Consults:    IP CONSULT TO CARDIOLOGY  IP CONSULT TO DIETITIAN      Assessment/Plan:    Active Hospital Problems    Diagnosis    [facility] (non-ST elevated myocardial [facility]) [facility] [I21.4]    Hypokalemia [E87.6]    Anemia [D64.9]    GERD (gastroesophageal reflux disease) [K21.9]    Coronary artery disease [I25.10]       NSTEMI - acute NSTEMI on arrival s/p [facility] per Cardiology 26 April w/ multivessel CAD. CT Surgery consulted for CABG w/ plan to proceed. W/up in progress. COPD - w/out chronic respiratory failure on no baseline home O2. Controlled on home medication regimen - continued. CTPA negative for PE but showed diffuse emphysematous changes in bilateral lower lungs.  Elevated procalcitonin on admission - started on empiric Rocephin 24 April - changed to DAILY dosing but no clinical evidence of PNA. Last dose 28 April. HTN - w/out known CAD and no evidence of active signs/sxs of ischemia/failure. Currently controlled on home meds w/ vitals reviewed and documented as above.     DM2 - controlled on home Insulin - continued. Follow FSBS/SSI high regimen. Last HbA1c 7.8% dated this admission. Anticipate resuming/continuing home regimen at discharge.      Chronic Back Pain - Patient w/ Chronic Opioid dependence POArrival w/out signs/sxs of active abuse and or w/drawal. Continue current mgt w/ opioids as ordered and assess for efficacy. GERD - w/out active signs/sxs of dysphagia/odynophagia. No evidence of active PUD or hx of GI bleed. Controlled on home PPI - continue. HypoKalemia - etiology clinically unable to determine, possibly 2nd to diuretics. Follow serial labs and replace PRN. Reviewed and documented as above. Anemia - etiology clinically unable to determine, w/out evidence of active bleeding/hemolysis. Asymptomatic w/out indication for transfusion. Follow serial labs. Reviewed and documented as above.        DVT Prophylaxis: Heparin gtt, on ASA/Brilinta  Diet: DIET CARDIAC; Carb Control: 4 carb choices (60 gms)/meal  Dietary Nutrition Supplements: Wound Healing Oral Supplement, Diabetic Oral Supplement  Code Status: Full Code      PT/OT Eval Status: not yet ordered.      Dispo - here for the foreseeable future

## 2021-04-28 NOTE — PROGRESS NOTES
Physical Therapy  Facility/Department: [address] [place] [address] PCU  Daily Treatment Note  NAME: Chayito Jordan  : 1950  MRN: [id number]    Date of Service: 2021    Discharge Recommendations:  Continue to assess pending progress        Assessment   Body structures, Functions, Activity limitations: Decreased functional mobility ; Decreased endurance  Assessment: pt found in recliner, pleasant and agreeable. reports chonic back pain, eases with rest and lumbar support pillow in recliner. grossly mod ind with transfers without UE support and SBA for gait with RW (limited by back pain). tolerated ther ex without complaints. pt questioning therapist about rehab after surgery 5/3, therapist answering pt questions regarding role of rehab if it is indicated post CABG. conitune to assess post op for d/c rec. Treatment Diagnosis: decreased strength, endurance and mobility  Specific instructions for Next Treatment: ex, mobility, gait  Prognosis: Good  Decision Making: Low Complexity  PT Education: Disease Specific Education;Goals;Transfer Training;PT Role;Energy Conservation; Functional Mobility Training;Plan of Care;General Safety;Gait Training;Precautions  Patient Education: Pt and daugter educated in sternal precautions for upcoming CABG surgery. They voice understanding  Barriers to Learning: none  REQUIRES PT FOLLOW UP: Yes  Activity Tolerance  Activity Tolerance: Patient Tolerated treatment well  Activity Tolerance: Bp= 108/52 pulse= 71 bpm and Spo2= 98% on room air     Patient Diagnosis(es): There were no encounter diagnoses.      has a past medical history of Atherosclerosis of native artery of right lower extremity with rest pain ([address] Back pain, Branch retinal vein occlusion, Bronchiectasis with acute exacerbation (HCC), Closed compression fracture of thoracic vertebra ([address] Closed fracture of facial bone with routine healing, Closed jaw fracture ([address] Community acquired pneumonia of left lower lobe of lung, Compression fracture of L1 lumbar vertebra (HCC), COPD (chronic obstructive pulmonary disease) ([address] Fracture of tibial plateau, closed, left, initial encounter, Minimally displaced zone I fracture of sacrum (HCC), Mucus plugging of bronchi, Osteomyelitis of mandible, Osteoporosis with pathological fracture, Post herpetic neuralgia, Proximal humerus fracture, Rheumatoid arthritis ([address] Shingles, Sleep apnea, Temporal arteritis ([address] Tobacco use, Tracheomalacia, and Vitreous hemorrhage, right eye ([address] has a past surgical history that includes hernia repair; Mandible fracture surgery; Foot surgery; Elbow surgery; Cataract removal; Tubal ligation; Knee arthroscopy; Kyphosis surgery; laminectomy; Spinal fusion; Septoplasty (5/7/2013); Artery surgery (5/30/13); Upper gastrointestinal endoscopy (4/8/2014); bronchoscopy; bronchoscopy (N/A, 6/12/2019); Mandible fracture surgery (02/2020); back surgery (08/2020); other surgical history (01/08/2021); Pressure ulcer debridement (N/A, 1/8/2021); and Artery Biopsy (Right, 03/01/2021). Restrictions  Restrictions/Precautions  Restrictions/Precautions: Fall Risk, General Precautions, Up as Tolerated  Subjective   General  Chart Reviewed: Yes  Response To Previous Treatment: Patient with no complaints from previous session. Family / Caregiver Present: No  Referring Practitioner: Darrel Gilliam CNP  Subjective  Subjective: pleasant and agreeable, reports some pain in back (chronic in nature)  General Comment  Comments: found in recliner. RN cleared pt to participate.   Pain Screening  Patient Currently in Pain: Yes  Pain Assessment  Pain Assessment: 0-10  Pain Level: 6  Pain Type: Chronic pain  Pain Location: Back  Pain Orientation: Lower  Pain Descriptors: Aching  Non-Pharmaceutical Pain Intervention(s): Ambulation/Increased Activity;Repositioned  Response to Pain Intervention: Patient Satisfied  Vital Signs  Patient Currently in Pain: Yes Orientation  Orientation  Overall Orientation Status: Within Normal Limits  Cognition      Objective      Transfers  Sit to Stand: (x 3 reps from recliner without UE support)  Stand to sit: Modified independent  Ambulation  Ambulation?: Yes  Ambulation 1  Surface: level tile  Device: Rolling Walker  Assistance: Stand by assistance;Supervision  Quality of Gait: slow pace, mild flexed posture, therapist managed IV pole and wound vac, recipricol [id number]  Distance: 90 ft        Exercises  Hip Flexion: x15 BLE  Knee Long Arc Quad: x15 BLE  Ankle Pumps: x15 BLE  Comments: completed seated in recliner             AM-PAC Score  AM-PAC Inpatient Mobility Raw Score : 20 (04/28/21 1318)  AM-PAC Inpatient T-Scale Score : 47.67 (04/28/21 1318)  Mobility Inpatient CMS 0-100% Score: 35.83 (04/28/21 1318)  Mobility Inpatient CMS G-Code Modifier : CJ (04/28/21 1318)          Goals  Short term goals  Time Frame for Short term goals: 1 week (5/4) unless otherwise specified  Short term goal 1: pt to perform supine to and from sit without use of UEs  Short term goal 2: Pt to transfer sit to and from stand without use of UEs- MET  Short term goal 3: pt to amb 150 ft with RW and supervision  Short term goal 4: pt to participate in LE Ex 10 x each by 4/30- GOAL MET. participates in 15 reps of LE ther ex 4/28  Patient Goals   Patient goals : \"to get up and walk with RW, to start preparing for CABG surgery next wk\"    Plan    Plan  Times per week: 3-5 x week prior to surgery  Specific instructions for Next Treatment: ex, mobility, gait  Current Treatment Recommendations: Strengthening, Transfer Training, Endurance Training, Gait Training, Functional Mobility Training, Safety Education & Training, Home Exercise Program, Pain Management  Safety Devices  Type of devices:  All fall risk precautions in place, Gait belt, Patient at risk for falls, Nurse notified, Call light within reach, Left in chair     Therapy Time   Individual Concurrent Group Co-treatment   Time In 6872         Time Out 1315         Minutes 27         Timed Code Treatment Minutes: 3 [facility] Minutes       Luis Green PT

## 2021-04-29 PROBLEM — E11.9 DIABETES MELLITUS (HCC): Status: ACTIVE | Noted: 2019-01-17

## 2021-04-29 LAB
ANION GAP SERPL CALCULATED.3IONS-SCNC: 7 MMOL/L (ref 3–16)
APTT: 52 SEC (ref 24.2–36.2)
BUN BLDV-MCNC: 19 MG/DL (ref 7–20)
CALCIUM SERPL-MCNC: 8.7 MG/DL (ref 8.3–10.6)
CHLORIDE BLD-SCNC: 97 MMOL/L (ref 99–110)
CO2: 27 MMOL/L (ref 21–32)
CREAT SERPL-MCNC: 1 MG/DL (ref 0.6–1.2)
GFR AFRICAN AMERICAN: >60
GFR NON-AFRICAN AMERICAN: 55
GLUCOSE BLD-MCNC: 109 MG/DL (ref 70–99)
GLUCOSE BLD-MCNC: 164 MG/DL (ref 70–99)
GLUCOSE BLD-MCNC: 283 MG/DL (ref 70–99)
GLUCOSE BLD-MCNC: 372 MG/DL (ref 70–99)
GLUCOSE BLD-MCNC: 91 MG/DL (ref 70–99)
PERFORMED ON: ABNORMAL
PERFORMED ON: NORMAL
POTASSIUM SERPL-SCNC: 4.5 MMOL/L (ref 3.5–5.1)
SODIUM BLD-SCNC: 131 MMOL/L (ref 136–145)

## 2021-04-29 PROCEDURE — 6360000002 HC RX W HCPCS: Performed by: INTERNAL MEDICINE

## 2021-04-29 PROCEDURE — 6370000000 HC RX 637 (ALT 250 FOR IP): Performed by: INTERNAL MEDICINE

## 2021-04-29 PROCEDURE — 97535 SELF CARE MNGMENT TRAINING: CPT

## 2021-04-29 PROCEDURE — 97116 GAIT TRAINING THERAPY: CPT

## 2021-04-29 PROCEDURE — 6370000000 HC RX 637 (ALT 250 FOR IP): Performed by: NURSE PRACTITIONER

## 2021-04-29 PROCEDURE — 97530 THERAPEUTIC ACTIVITIES: CPT

## 2021-04-29 PROCEDURE — 99232 SBSQ HOSP IP/OBS MODERATE 35: CPT | Performed by: NURSE PRACTITIONER

## 2021-04-29 PROCEDURE — 97110 THERAPEUTIC EXERCISES: CPT

## 2021-04-29 PROCEDURE — 2580000003 HC RX 258: Performed by: INTERNAL MEDICINE

## 2021-04-29 PROCEDURE — 80048 BASIC METABOLIC PNL TOTAL CA: CPT

## 2021-04-29 PROCEDURE — 2060000000 HC ICU INTERMEDIATE R&B

## 2021-04-29 PROCEDURE — 85730 THROMBOPLASTIN TIME PARTIAL: CPT

## 2021-04-29 PROCEDURE — 36415 COLL VENOUS BLD VENIPUNCTURE: CPT

## 2021-04-29 RX ORDER — MECOBALAMIN 5000 MCG
5 TABLET,DISINTEGRATING ORAL NIGHTLY PRN
Status: DISCONTINUED | OUTPATIENT
Start: 2021-04-29 | End: 2021-05-10 | Stop reason: HOSPADM

## 2021-04-29 RX ORDER — LOSARTAN POTASSIUM 25 MG/1
25 TABLET ORAL DAILY
Status: DISCONTINUED | OUTPATIENT
Start: 2021-04-29 | End: 2021-05-03

## 2021-04-29 RX ADMIN — ATORVASTATIN CALCIUM 40 MG: 40 TABLET, FILM COATED ORAL at 20:39

## 2021-04-29 RX ADMIN — LOSARTAN POTASSIUM 25 MG: 25 TABLET, FILM COATED ORAL at 09:28

## 2021-04-29 RX ADMIN — LATANOPROST 1 DROP: 50 SOLUTION OPHTHALMIC at 09:29

## 2021-04-29 RX ADMIN — MORPHINE SULFATE 15 MG: 15 TABLET, FILM COATED, EXTENDED RELEASE ORAL at 09:28

## 2021-04-29 RX ADMIN — SODIUM CHLORIDE, PRESERVATIVE FREE 10 ML: 5 INJECTION INTRAVENOUS at 13:14

## 2021-04-29 RX ADMIN — GABAPENTIN 300 MG: 300 CAPSULE ORAL at 09:28

## 2021-04-29 RX ADMIN — INSULIN LISPRO 2 UNITS: 100 INJECTION, SOLUTION INTRAVENOUS; SUBCUTANEOUS at 20:39

## 2021-04-29 RX ADMIN — CYCLOBENZAPRINE 10 MG: 10 TABLET, FILM COATED ORAL at 03:38

## 2021-04-29 RX ADMIN — TORSEMIDE 20 MG: 20 TABLET ORAL at 09:28

## 2021-04-29 RX ADMIN — INSULIN LISPRO 6 UNITS: 100 INJECTION, SOLUTION INTRAVENOUS; SUBCUTANEOUS at 17:13

## 2021-04-29 RX ADMIN — OXYCODONE AND ACETAMINOPHEN 1 TABLET: 10; 325 TABLET ORAL at 00:14

## 2021-04-29 RX ADMIN — INSULIN GLARGINE 20 UNITS: 100 INJECTION, SOLUTION SUBCUTANEOUS at 20:39

## 2021-04-29 RX ADMIN — GABAPENTIN 300 MG: 300 CAPSULE ORAL at 20:39

## 2021-04-29 RX ADMIN — METOPROLOL SUCCINATE 25 MG: 25 TABLET, EXTENDED RELEASE ORAL at 09:28

## 2021-04-29 RX ADMIN — CEFTRIAXONE SODIUM 1000 MG: 1 INJECTION, POWDER, FOR SOLUTION INTRAMUSCULAR; INTRAVENOUS at 09:29

## 2021-04-29 RX ADMIN — ROFLUMILAST 500 MCG: 500 TABLET ORAL at 20:39

## 2021-04-29 RX ADMIN — DULOXETINE HYDROCHLORIDE 60 MG: 60 CAPSULE, DELAYED RELEASE ORAL at 09:28

## 2021-04-29 RX ADMIN — ASPIRIN 81 MG: 81 TABLET, COATED ORAL at 09:29

## 2021-04-29 RX ADMIN — SODIUM CHLORIDE, PRESERVATIVE FREE 10 ML: 5 INJECTION INTRAVENOUS at 20:40

## 2021-04-29 RX ADMIN — HEPARIN SODIUM 1150 UNITS/HR: 10000 INJECTION, SOLUTION INTRAVENOUS at 09:29

## 2021-04-29 RX ADMIN — MORPHINE SULFATE 15 MG: 15 TABLET, FILM COATED, EXTENDED RELEASE ORAL at 20:39

## 2021-04-29 RX ADMIN — INSULIN LISPRO 10 UNITS: 100 INJECTION, SOLUTION INTRAVENOUS; SUBCUTANEOUS at 13:09

## 2021-04-29 RX ADMIN — PANTOPRAZOLE SODIUM 40 MG: 40 TABLET, DELAYED RELEASE ORAL at 09:29

## 2021-04-29 ASSESSMENT — PAIN DESCRIPTION - FREQUENCY
FREQUENCY: CONTINUOUS

## 2021-04-29 ASSESSMENT — PAIN SCALES - GENERAL
PAINLEVEL_OUTOF10: 5
PAINLEVEL_OUTOF10: 8
PAINLEVEL_OUTOF10: 5

## 2021-04-29 ASSESSMENT — PAIN DESCRIPTION - PAIN TYPE
TYPE: CHRONIC PAIN

## 2021-04-29 ASSESSMENT — PAIN DESCRIPTION - LOCATION
LOCATION: BACK
LOCATION: BREAST
LOCATION: BACK

## 2021-04-29 ASSESSMENT — PAIN SCALES - WONG BAKER
WONGBAKER_NUMERICALRESPONSE: 2
WONGBAKER_NUMERICALRESPONSE: 2

## 2021-04-29 ASSESSMENT — PAIN DESCRIPTION - ORIENTATION
ORIENTATION: LOWER

## 2021-04-29 ASSESSMENT — PAIN DESCRIPTION - DESCRIPTORS: DESCRIPTORS: ACHING

## 2021-04-29 NOTE — PROGRESS NOTES
Physical Therapy  Facility/Department: [facility] C4 PCU  Daily Treatment Note  NAME: Carl Borja  : 1950  MRN: [id number]    Date of Service: 2021    Discharge Recommendations:  Continue to assess pending progress        Assessment   Body structures, Functions, Activity limitations: Decreased functional mobility ; Decreased endurance  Assessment: pt demos SBA/S for gait with RW (limited by back pain). tolerated ther ex without complaints. Pt left in chair with waffle air cushion, left with needs. contitune to assess post op for d/c rec. Treatment Diagnosis: decreased strength, endurance and mobility  Prognosis: Good  Decision Making: Low Complexity  Patient Education: Pt educated in sternal precautions for upcoming CABG surgery. Pt voice understanding  Barriers to Learning: none  REQUIRES PT FOLLOW UP: Yes  Activity Tolerance  Activity Tolerance: Patient Tolerated treatment well  Activity Tolerance: /74  HR 76  O2 95% RA     Patient Diagnosis(es): There were no encounter diagnoses. has a past medical history of Atherosclerosis of native artery of right lower extremity with rest pain ([address] Back pain, Branch retinal vein occlusion, Bronchiectasis with acute exacerbation (HCC), Closed compression fracture of thoracic vertebra ([address] Closed fracture of facial bone with routine healing, Closed jaw fracture ([address] Community acquired pneumonia of left lower lobe of lung, Compression fracture of L1 lumbar vertebra (HCC), COPD (chronic obstructive pulmonary disease) ([address] Fracture of tibial plateau, closed, left, initial encounter, Minimally displaced zone I fracture of sacrum (HCC), Mucus plugging of bronchi, Osteomyelitis of mandible, Osteoporosis with pathological fracture, Post herpetic neuralgia, Proximal humerus fracture, Rheumatoid arthritis ([address] Shingles, Sleep apnea, Temporal arteritis ([address] Tobacco use, Tracheomalacia, and Vitreous hemorrhage, right eye ([address]    has a past surgical history Dynamic: Good  Standing - Static: Good;-  Standing - Dynamic: Fair;+  Exercises  Gluteal Sets: 12  Hip Flexion: x15 BLE  Knee Long Arc Quad: x15 BLE  Ankle Pumps: x15 BLE         Comment: toilet transfers min A. Stand balanceat sink superv A. AM-PAC Score  AM-PAC Inpatient Mobility Raw Score : 19 (04/29/21 1354)  AM-PAC Inpatient T-Scale Score : 45.44 (04/29/21 1354)  Mobility Inpatient CMS 0-100% Score: 41.77 (04/29/21 1354)  Mobility Inpatient CMS G-Code Modifier : CK (04/29/21 1354)          Goals  Short term goals  Time Frame for Short term goals: 1 week (5/4) unless otherwise specified  Short term goal 1: pt to perform supine to and from sit without use of UEs -4/29 NT  Short term goal 2: Pt to transfer sit to and from stand without use of UEs- MET  -4/29 min A from chair  Short term goal 3: pt to amb 150 ft with RW and supervision -4/29 sba/S 90' rw  Short term goal 4: pt to participate in LE Ex 10 x each by 4/30- GOAL MET. participates in 15 reps of LE ther ex 4/29  Patient Goals   Patient goals : \"to get up and walk with RW, to start preparing for CABG surgery next wk\"    Plan    Plan  Times per week: 3-5 x week prior to surgery  Specific instructions for Next Treatment: ex, mobility, gait  Current Treatment Recommendations: Strengthening, Transfer Training, Endurance Training, Gait Training, Functional Mobility Training, Safety Education & Training, Home Exercise Program, Pain Management  Safety Devices  Type of devices:  All fall risk precautions in place, Gait belt, Patient at risk for falls, Nurse notified, Call light within reach, Left in chair     Therapy Time   Individual Concurrent Group Co-treatment   Time In 1240         Time Out 1314         Minutes Margaret Morocho

## 2021-04-29 NOTE — PROGRESS NOTES
Occupational Therapy  Facility/Department: [facility] C4 PCU  Daily Treatment Note  NAME: Bhargavi Moran  : 1950  MRN: [id number]    Date of Service: 2021    Discharge Recommendations:  24 hour supervision or assist, Home with Home health OT       Assessment   Performance deficits / Impairments: Decreased functional mobility ; Decreased ADL status; Decreased strength;Decreased safe awareness;Decreased cognition;Decreased endurance;Decreased balance  Assessment: Pt very pleasant and cooperative, admitted with NSTEMI, with plans for CABG next week. Pt currently SBA for transfers/mobility and CGA for standing higher level balance with RW. Pt left in chair with waffle air cushion, left with needs. Prognosis: Good  REQUIRES OT FOLLOW UP: Yes  Activity Tolerance  Activity Tolerance: Patient Tolerated treatment well  Safety Devices  Type of devices: Gait belt;Left in chair;Call light within reach         Patient Diagnosis(es): There were no encounter diagnoses. has a past medical history of Atherosclerosis of native artery of right lower extremity with rest pain ([address] Back pain, Branch retinal vein occlusion, Bronchiectasis with acute exacerbation (HCC), Closed compression fracture of thoracic vertebra ([address] Closed fracture of facial bone with routine healing, Closed jaw fracture ([address] Community acquired pneumonia of left lower lobe of lung, Compression fracture of L1 lumbar vertebra (HCC), COPD (chronic obstructive pulmonary disease) ([address] Fracture of tibial plateau, closed, left, initial encounter, Minimally displaced zone I fracture of sacrum (HCC), Mucus plugging of bronchi, Osteomyelitis of mandible, Osteoporosis with pathological fracture, Post herpetic neuralgia, Proximal humerus fracture, Rheumatoid arthritis ([address] Shingles, Sleep apnea, Temporal arteritis ([address] Tobacco use, Tracheomalacia, and Vitreous hemorrhage, right eye ([address]    has a past surgical history that includes hernia repair; Mandible fracture surgery; Foot surgery; Elbow surgery; Cataract removal; Tubal ligation; Knee arthroscopy; Kyphosis surgery; laminectomy; Spinal fusion; Septoplasty (5/7/2013); Artery surgery (5/30/13); Upper gastrointestinal endoscopy (4/8/2014); bronchoscopy; bronchoscopy (N/A, 6/12/2019); Mandible fracture surgery (02/2020); back surgery (08/2020); other surgical history (01/08/2021); Pressure ulcer debridement (N/A, 1/8/2021); and Artery Biopsy (Right, 03/01/2021). Restrictions  Restrictions/Precautions  Restrictions/Precautions: Fall Risk, General Precautions, Up as Tolerated  Position Activity Restriction  Other position/activity restrictions: sacral wound vac     Subjective   General  Chart Reviewed: Yes  Patient assessed for rehabilitation services?: Yes  Family / Caregiver Present: No  Referring Practitioner: Kwesi Llanos NP  Diagnosis: NSTEMI  Subjective  Subjective: Pt supine, pleasant and cooperative. General Comment  Comments: RN clears for therapy     Vital Signs  Patient Currently in Pain: Denies     Orientation  Orientation  Overall Orientation Status: Within Functional Limits     Objective    ADL  Feeding: Independent(to drink sitting)  Additional Comments: Pt declining further ADL at this time. Balance  Sitting Balance: Independent  Standing Balance: Contact guard assistance(with RW for higher level balance activity)  Functional Mobility  Functional Mobility Comments: Pt walked ~5ft from bed to chair with gait belt, RW, and SBA. Bed mobility  Comment: Pt completed log roll technique after education with SPV and VCs, HOB flat, no rail in prep for sx.      Transfers  Sit to stand: Stand by assistance  Stand to sit: Stand by assistance                          Cognition  [facility]                                       Therapeutic exercise/activity:  Pt completed 1 set of the following to build strength and functional activity tolerance for ADL, IADL, and t/fs as well as improve balance reactions. CGA to SBA with light UE balance support on RW prn:  10 consecutive sit to stands from EOB without use of arms in prep for sx  10 calf raises  10 knee raises each leg    Education: Role of OT, safe t/f training, safe use of DME, awareness of deficits, discharge planning, ADL as therapeutic exercise, importance of OOB, sternal precautions with functional implications     Plan   Plan  Times per week: 3-5x/wk    AM-PAC Score        AM-[facility] Inpatient Daily Activity Raw Score: 20 (04/29/21 1225)  AM-PAC Inpatient ADL T-Scale Score : 42.03 (04/29/21 1225)  ADL Inpatient CMS 0-100% Score: 38.32 (04/29/21 1225)  ADL Inpatient CMS G-Code Modifier : Yen Kathy (04/29/21 1225)    Goals  Short term goals  Time Frame for Short term goals: 1 week by 5/5  Short term goal 1: Pt will complete LB dressing with SPV  Short term goal 2: Pt will complete functional transfers with SPV  Short term goal 3: Pt will tolerate B UE ex x 20 reps w/o fatigue  Short term goal 4: Pt will complete toileting with SPV  Patient Goals   Patient goals : \"get back to my life after surgery\"       Therapy Time   Individual Concurrent Group Co-treatment   Time In 0945         Time Out 1016         Minutes 31         Timed Code Treatment Minutes: 31 Minutes       If patient is discharged prior to next treatment session, this note will serve as the discharge summary.   Sina Marrufo OTR/L #[id number]

## 2021-04-29 NOTE — CARE COORDINATION
PT/OT working with patient to help build strength prior to surgery. Planning for OR for CABG Monday 5/3/2021 per CTS. Patient was active with [facility] prior to admit for wound vac dressing changes, has [employer] home wound vac. Case Management will follow clinical course in the hospital and continue to assist with discharge planning needs. Wound Care and Cardiology following.

## 2021-04-29 NOTE — PROGRESS NOTES
Conjunctivae/corneas clear. Neck: Supple, with full range of motion. No jugular venous distention. Trachea midline. Respiratory:  Normal respiratory effort. Clear to auscultation, bilaterally without Rales/Wheezes/Rhonchi. Cardiovascular: Regular rate and rhythm with normal S1/S2 without murmurs, rubs or gallops. Abdomen: Soft, non-tender, non-distended with normal bowel sounds. Musculoskeletal: No clubbing, cyanosis or edema bilaterally. Full range of motion without deformity. Skin: Skin color, texture, turgor normal.  No rashes or lesions. Neurologic:  Neurovascularly intact without any focal sensory/motor deficits. Cranial nerves: II-XII intact, grossly non-focal.  Psychiatric: Alert and oriented, thought content appropriate, normal insight  Capillary Refill: Brisk,< 3 seconds   Peripheral Pulses: +2 palpable, equal bilaterally       Labs:   Recent Labs     04/27/21  0527 04/28/21  0555   WBC 3.9* 6.2   HGB 9.2* 9.4*   HCT 28.1* 28.4*    205     Recent Labs     04/26/21  0855 04/27/21  0527 04/28/21  0555 04/29/21  0456   * 135* 131* 131*   K 3.3* 4.3 4.3 4.5   CL 97* 102 96* 97*   CO2 28 25 26 27   BUN 11 10 14 19   CREATININE 0.9 0.8 1.0 1.0   CALCIUM 8.6 8.4 8.4 8.7   PHOS 2.5 3.4 3.8  --      No results for input(s): AST, ALT, BILIDIR, BILITOT, ALKPHOS in the last 72 hours. No results for input(s): INR in the last 72 hours. No results for input(s): Rafaela Castor in the last 72 hours.     Urinalysis:      Lab Results   Component Value Date    NITRU Negative 04/26/2021    WBCUA 0-2 04/26/2021    BACTERIA 1+ 04/23/2021    RBCUA 3-4 04/26/2021    BLOODU TRACE-INTACT 04/26/2021    SPECGRAV 1.010 04/26/2021    GLUCOSEU Negative 04/26/2021    GLUCOSEU NEGATIVE 03/20/2010       Consults:    IP CONSULT TO CARDIOLOGY  IP CONSULT TO DIETITIAN      Assessment/Plan:    Active Hospital Problems    Diagnosis    [facility] [I21.4]    Hypokalemia [E87.6]  Anemia [D64.9]    GERD (gastroesophageal reflux disease) [K21.9]    Coronary artery disease [I25.10]       NSTEMI - acute NSTEMI on arrival s/p [facility] per Cardiology 26 April w/ multivessel CAD. CT Surgery consulted for CABG w/ plan to proceed, tentatively Monday 3 May. COPD - w/out chronic respiratory failure on no baseline home O2. Controlled on home medication regimen - continued. CTPA negative for PE but showed diffuse emphysematous changes in bilateral lower lungs.  Elevated procalcitonin on admission - started on empiric Rocephin 24 April - changed to DAILY dosing but no clinical evidence of PNA. Last dose 28 April. HTN - w/out known CAD and no evidence of active signs/sxs of ischemia/failure. Currently controlled on home meds w/ vitals reviewed and documented as above.     DM2 - controlled on home Insulin - continued and titrated down 28 April. Follow FSBS/SSI medium regimen. Last HbA1c 7.8% dated this admission. Anticipate resuming/continuing home regimen at discharge.      Chronic Back Pain - Patient w/ Chronic Opioid dependence POArrival w/out signs/sxs of active abuse and or w/drawal. Continue current mgt w/ opioids as ordered and assess for efficacy. GERD - w/out active signs/sxs of dysphagia/odynophagia. No evidence of active PUD or hx of GI bleed. Controlled on home PPI - continue. HypoKalemia - etiology clinically unable to determine, possibly 2nd to diuretics. Follow serial labs and replace PRN. Reviewed and documented as above. Anemia - etiology clinically unable to determine, w/out evidence of active bleeding/hemolysis. Stable and asymptomatic w/out indication for transfusion. Follow serial labs.   Reviewed and documented as above.        DVT Prophylaxis: Heparin gtt, on ASA/Brilinta  Diet: DIET CARDIAC; Carb Control: 4 carb choices (60 gms)/meal  Dietary Nutrition Supplements: Wound Healing Oral Supplement, Diabetic Oral Supplement  Code Status: Full Code      PT/OT Eval Status: not yet ordered. Dispo - here for the foreseeable future pending CABG.        Sarah Joseph MD

## 2021-04-29 NOTE — PROCEDURES
[address] [place] [address]                               PULMONARY FUNCTION    PATIENT NAME: Kenia Talley                    :        1950  MED REC NO:   [id number]                          ROOM:       8755  ACCOUNT NO:   [de-identified]                           ADMIT DATE: 2021  PROVIDER:     Joe Marques MD    DATE OF PROCEDURE:  2021    PFT INTERPRETATION    The patient is a 60-year-old female who underwent a PFT for preop open  heart surgery. Spirometry shows FVC to be 70%, FEV1 to be 69%, FEV1 to  FVC ratio was 99%, QBH35-92% was 64%. The patient had some  postbronchodilator improvement which was not significant especially in  the small airways. Lung volume shows the total capacity was mildly  reduced at 73%. The patient has some air trapping. The patient also  has decrease in ERV which maybe because of body habitus. The patient's  diffusion capacity when adjusted for volume was decreased. The  patient's flow-volume loop was essentially normal on the basis of this  PFT. The patient has mild restrictive lung disease, along with decrease  in diffusion capacity with air trapping, which may be secondary to early  emphysematous changes. The patient also has some changes in the PFT  parameters because of body habitus. Please correlate clinically.         Jamarcus Malik MD    D: 2021 14:53:47       T: 2021 15:00:22     [email or web address]  Job#: [id number]     Doc#: [id number]    CC:

## 2021-04-29 NOTE — PROGRESS NOTES
significant mitral valve or aortic valve disease noted. LVEDP was normal. There was no gradient noted across the aortic valve during pullback of the catheter. CONCLUSIONS:   1. Severe multi-vessel coronary artery disease   ASSESSMENT/RECOMMENDATIONS:   1.  CABG consult

## 2021-04-29 NOTE — PROGRESS NOTES
CVTS Thoracic Progress Note:          CC: Multivessel CAD    Subj: awake, sitting up in bed, getting ready to eat breakfast.     Obj:    Blood pressure 104/65, pulse 78, temperature 99.2 °F (37.3 °C), temperature source Oral, resp. rate 16, height 5' 10\" (1.778 m), weight 161 lb 11.2 oz (73.3 kg), SpO2 96 %. Lungs CTAB   Abdomen soft, non-tender   UOP 2400 ml in 24 hrs; Cr 1.0    Diagnostics:   Recent Labs     21  0527 21  0555   WBC 3.9* 6.2   HGB 9.2* 9.4*   HCT 28.1* 28.4*    205                                                                  Recent Labs     21  0527 21  0555 21  0456   * 131* 131*   K 4.3 4.3 4.5    96* 97*   CO2 25 26 27   BUN 10 14 19   CREATININE 0.8 1.0 1.0   GLUCOSE 169* 72 109*     Recent Labs     21  0555   MG 1.80      Pre-op testing:   Carotid dopplers: 21   Right Impression   The right internal carotid artery reveals a <50% diameter reducing stenosis. The right vertebral artery demonstrates normal antegrade flow. The right subclavian artery is visualized and demonstrates multiphasic flow. Left Impression   The left internal carotid artery reveals a <50% diameter reducing stenosis. The left vertebral artery demonstrates normal antegrade flow. The left subclavian artery is visualized and demonstrates multiphasic flow. There is no previous exam for comparison. Vein mappin21    Summary        1. There is no evidence of superficial venous thrombosis involving the right    BELOW the knee greater saphenous vein.    2. There is evidence of chronic partially occluding superficial venous    thrombosis involving the left greater saphenous vein at the mid calf segment    and distal calf segment. Echo: 21 with Dr. Jade Pena    Summary:    Normal left ventricle systolic function with an estimated ejection fraction of 55%. No regional wall motion abnormalities are seen.    Normal left ventricular diastolic filling pressure. Mild eccentric aortic regurgitation. Mild mitral and tricuspid regurgitation. Systolic pulmonary artery pressure (SPAP) is normal and estimated at 27 mmHg (right atrial pressure 3 mmHg). CXR: 4/23/21   Impression   Bilateral parahilar linear opacities could represent pulmonary edema or   infection     PFT's: 4/27/21 completed, on chart. Assess/Plan:   Care per primary team    Multivessel CAD:   pre-op testing completed. Continue with metabolization of Brilinta  Pt on heparin drip   PT/OT working with patient to help build strength prior to surgery. Will continue to follow post-op. Will plan for the OR Monday 5/3/21. Pt having difficulty sleeping - will add melatonin QHS PRN  __________________________________________________________    Irvin Mcdaniel, CNP  4/29/2021  9:59 AM  Note reviewed, events of last 24 hours reviewed along with vital signs and I/Os and patient examined. Assessment and plans discussed and are as outlined above.      Navdeep Child MD, FACS, [address], FACCP, [place]

## 2021-04-30 LAB
ALBUMIN SERPL-MCNC: 2.9 G/DL (ref 3.4–5)
ANION GAP SERPL CALCULATED.3IONS-SCNC: 10 MMOL/L (ref 3–16)
APTT: 48.8 SEC (ref 24.2–36.2)
BUN BLDV-MCNC: 23 MG/DL (ref 7–20)
CALCIUM SERPL-MCNC: 9.1 MG/DL (ref 8.3–10.6)
CHLORIDE BLD-SCNC: 95 MMOL/L (ref 99–110)
CO2: 25 MMOL/L (ref 21–32)
CREAT SERPL-MCNC: 1 MG/DL (ref 0.6–1.2)
GFR AFRICAN AMERICAN: >60
GFR NON-AFRICAN AMERICAN: 55
GLUCOSE BLD-MCNC: 186 MG/DL (ref 70–99)
GLUCOSE BLD-MCNC: 190 MG/DL (ref 70–99)
GLUCOSE BLD-MCNC: 273 MG/DL (ref 70–99)
GLUCOSE BLD-MCNC: 485 MG/DL (ref 70–99)
GLUCOSE BLD-MCNC: 523 MG/DL (ref 70–99)
HCT VFR BLD CALC: 26.8 % (ref 36–48)
HEMOGLOBIN: 8.9 G/DL (ref 12–16)
MCH RBC QN AUTO: 28 PG (ref 26–34)
MCHC RBC AUTO-ENTMCNC: 33.3 G/DL (ref 31–36)
MCV RBC AUTO: 84 FL (ref 80–100)
PDW BLD-RTO: 15.4 % (ref 12.4–15.4)
PERFORMED ON: ABNORMAL
PHOSPHORUS: 4.7 MG/DL (ref 2.5–4.9)
PLATELET # BLD: 216 K/UL (ref 135–450)
PMV BLD AUTO: 8.4 FL (ref 5–10.5)
POTASSIUM SERPL-SCNC: 4.5 MMOL/L (ref 3.5–5.1)
RBC # BLD: 3.19 M/UL (ref 4–5.2)
SODIUM BLD-SCNC: 130 MMOL/L (ref 136–145)
WBC # BLD: 6.3 K/UL (ref 4–11)

## 2021-04-30 PROCEDURE — 85730 THROMBOPLASTIN TIME PARTIAL: CPT

## 2021-04-30 PROCEDURE — 97605 NEG PRS WND THER DME<=50SQCM: CPT

## 2021-04-30 PROCEDURE — 36415 COLL VENOUS BLD VENIPUNCTURE: CPT

## 2021-04-30 PROCEDURE — 6370000000 HC RX 637 (ALT 250 FOR IP): Performed by: INTERNAL MEDICINE

## 2021-04-30 PROCEDURE — 80069 RENAL FUNCTION PANEL: CPT

## 2021-04-30 PROCEDURE — 99232 SBSQ HOSP IP/OBS MODERATE 35: CPT | Performed by: NURSE PRACTITIONER

## 2021-04-30 PROCEDURE — 2060000000 HC ICU INTERMEDIATE R&B

## 2021-04-30 PROCEDURE — 97530 THERAPEUTIC ACTIVITIES: CPT

## 2021-04-30 PROCEDURE — 97110 THERAPEUTIC EXERCISES: CPT

## 2021-04-30 PROCEDURE — 2580000003 HC RX 258: Performed by: INTERNAL MEDICINE

## 2021-04-30 PROCEDURE — 6370000000 HC RX 637 (ALT 250 FOR IP): Performed by: NURSE PRACTITIONER

## 2021-04-30 PROCEDURE — 85027 COMPLETE CBC AUTOMATED: CPT

## 2021-04-30 RX ORDER — ASPIRIN 81 MG/1
81 TABLET ORAL ONCE
Status: COMPLETED | OUTPATIENT
Start: 2021-05-03 | End: 2021-05-03

## 2021-04-30 RX ORDER — MIDAZOLAM HYDROCHLORIDE 1 MG/ML
2 INJECTION INTRAMUSCULAR; INTRAVENOUS ONCE
Status: COMPLETED | OUTPATIENT
Start: 2021-05-03 | End: 2021-05-03

## 2021-04-30 RX ORDER — BISACODYL 10 MG
10 SUPPOSITORY, RECTAL RECTAL ONCE
Status: DISCONTINUED | OUTPATIENT
Start: 2021-05-02 | End: 2021-05-03

## 2021-04-30 RX ORDER — SODIUM CHLORIDE 9 MG/ML
INJECTION, SOLUTION INTRAVENOUS CONTINUOUS
Status: DISCONTINUED | OUTPATIENT
Start: 2021-05-03 | End: 2021-05-03

## 2021-04-30 RX ADMIN — ROFLUMILAST 500 MCG: 500 TABLET ORAL at 21:03

## 2021-04-30 RX ADMIN — GABAPENTIN 300 MG: 300 CAPSULE ORAL at 21:04

## 2021-04-30 RX ADMIN — INSULIN LISPRO 2 UNITS: 100 INJECTION, SOLUTION INTRAVENOUS; SUBCUTANEOUS at 08:35

## 2021-04-30 RX ADMIN — CYCLOBENZAPRINE 10 MG: 10 TABLET, FILM COATED ORAL at 15:21

## 2021-04-30 RX ADMIN — ATORVASTATIN CALCIUM 40 MG: 40 TABLET, FILM COATED ORAL at 21:04

## 2021-04-30 RX ADMIN — INSULIN GLARGINE 20 UNITS: 100 INJECTION, SOLUTION SUBCUTANEOUS at 21:03

## 2021-04-30 RX ADMIN — MORPHINE SULFATE 15 MG: 15 TABLET, FILM COATED, EXTENDED RELEASE ORAL at 08:34

## 2021-04-30 RX ADMIN — CYCLOBENZAPRINE 10 MG: 10 TABLET, FILM COATED ORAL at 06:16

## 2021-04-30 RX ADMIN — TORSEMIDE 20 MG: 20 TABLET ORAL at 08:33

## 2021-04-30 RX ADMIN — INSULIN LISPRO 10 UNITS: 100 INJECTION, SOLUTION INTRAVENOUS; SUBCUTANEOUS at 17:29

## 2021-04-30 RX ADMIN — ASPIRIN 81 MG: 81 TABLET, COATED ORAL at 08:34

## 2021-04-30 RX ADMIN — Medication 5 MG: at 21:03

## 2021-04-30 RX ADMIN — DULOXETINE HYDROCHLORIDE 60 MG: 60 CAPSULE, DELAYED RELEASE ORAL at 08:34

## 2021-04-30 RX ADMIN — LOSARTAN POTASSIUM 25 MG: 25 TABLET, FILM COATED ORAL at 08:33

## 2021-04-30 RX ADMIN — SODIUM CHLORIDE, PRESERVATIVE FREE 10 ML: 5 INJECTION INTRAVENOUS at 21:04

## 2021-04-30 RX ADMIN — GABAPENTIN 300 MG: 300 CAPSULE ORAL at 08:34

## 2021-04-30 RX ADMIN — METOPROLOL SUCCINATE 25 MG: 25 TABLET, EXTENDED RELEASE ORAL at 08:34

## 2021-04-30 RX ADMIN — OXYCODONE AND ACETAMINOPHEN 1 TABLET: 10; 325 TABLET ORAL at 15:21

## 2021-04-30 RX ADMIN — PANTOPRAZOLE SODIUM 40 MG: 40 TABLET, DELAYED RELEASE ORAL at 08:34

## 2021-04-30 RX ADMIN — INSULIN LISPRO 6 UNITS: 100 INJECTION, SOLUTION INTRAVENOUS; SUBCUTANEOUS at 21:03

## 2021-04-30 RX ADMIN — OXYCODONE AND ACETAMINOPHEN 1 TABLET: 10; 325 TABLET ORAL at 00:56

## 2021-04-30 RX ADMIN — MORPHINE SULFATE 15 MG: 15 TABLET, FILM COATED, EXTENDED RELEASE ORAL at 21:03

## 2021-04-30 ASSESSMENT — PAIN DESCRIPTION - ORIENTATION
ORIENTATION: LOWER
ORIENTATION: LOWER

## 2021-04-30 ASSESSMENT — COPD QUESTIONNAIRES: CAT_SEVERITY: MODERATE

## 2021-04-30 ASSESSMENT — PAIN SCALES - GENERAL
PAINLEVEL_OUTOF10: 0
PAINLEVEL_OUTOF10: 7
PAINLEVEL_OUTOF10: 8

## 2021-04-30 ASSESSMENT — PAIN DESCRIPTION - LOCATION
LOCATION: BACK;HAND
LOCATION: BACK
LOCATION: BACK
LOCATION: BUTTOCKS

## 2021-04-30 ASSESSMENT — PAIN DESCRIPTION - PAIN TYPE
TYPE: CHRONIC PAIN
TYPE: CHRONIC PAIN

## 2021-04-30 ASSESSMENT — PAIN - FUNCTIONAL ASSESSMENT: PAIN_FUNCTIONAL_ASSESSMENT: PREVENTS OR INTERFERES SOME ACTIVE ACTIVITIES AND ADLS

## 2021-04-30 NOTE — PROGRESS NOTES
Hospitalist Progress Note      PCP: Salinas Dominguez MD    Date of Admission: 4/24/2021    Chief Complaint: Chest Pain    Subjective: no new c/o. Medications:  Reviewed    Infusion Medications    heparin (PORCINE) Infusion 1,150 Units/hr (04/29/21 0929)    dextrose 100 mL/hr (04/26/21 0459)    vashe wound therapy      sodium chloride 25 mL (04/24/21 2037)     Scheduled Medications    losartan  25 mg Oral Daily    latanoprost  1 drop Both Eyes Daily    insulin glargine  20 Units Subcutaneous Nightly    insulin lispro  0-12 Units Subcutaneous TID WC    insulin lispro  0-6 Units Subcutaneous Nightly    torsemide  20 mg Oral Daily    gabapentin  300 mg Oral BID    morphine  15 mg Oral 2 times per day    aspirin EC  81 mg Oral Daily    atorvastatin  40 mg Oral Nightly    DULoxetine  60 mg Oral Daily    metoprolol succinate  25 mg Oral Daily    pantoprazole  40 mg Oral Daily    Roflumilast  500 mcg Oral Nightly    sodium chloride flush  5-40 mL Intravenous 2 times per day     PRN Meds: melatonin, dextrose, hydrALAZINE, vashe wound therapy, oxyCODONE-acetaminophen, albuterol sulfate HFA, cyclobenzaprine, heparin (porcine), heparin (porcine), sodium chloride flush, sodium chloride, promethazine **OR** ondansetron, acetaminophen **OR** acetaminophen, polyethylene glycol, perflutren lipid microspheres, nitroGLYCERIN, glucose, dextrose, glucagon (rDNA)      Intake/Output Summary (Last 24 hours) at 4/30/2021 0902  Last data filed at 4/30/2021 0901  Gross per 24 hour   Intake 1041.4 ml   Output 3250 ml   Net -2208.6 ml       Physical Exam Performed:    /67   Pulse 80   Temp 98.8 °F (37.1 °C) (Oral)   Resp 16   Ht 5' 10\" (1.778 m)   Wt 160 lb 8 oz (72.8 kg)   SpO2 98%   BMI 23.03 kg/m²     General appearance: No apparent distress, appears stated age and cooperative. HEENT: Pupils equal, round, and reactive to light. Conjunctivae/corneas clear. Neck: Supple, with full range of motion. No jugular venous distention. Trachea midline. Respiratory:  Normal respiratory effort. Clear to auscultation, bilaterally without Rales/Wheezes/Rhonchi. Cardiovascular: Regular rate and rhythm with normal S1/S2 without murmurs, rubs or gallops. Abdomen: Soft, non-tender, non-distended with normal bowel sounds. Musculoskeletal: No clubbing, cyanosis or edema bilaterally. Full range of motion without deformity. Skin: Skin color, texture, turgor normal.  No rashes or lesions. Neurologic:  Neurovascularly intact without any focal sensory/motor deficits. Cranial nerves: II-XII intact, grossly non-focal.  Psychiatric: Alert and oriented, thought content appropriate, normal insight  Capillary Refill: Brisk,< 3 seconds   Peripheral Pulses: +2 palpable, equal bilaterally       Labs:   Recent Labs     04/28/21  0555 04/30/21  0534   WBC 6.2 6.3   HGB 9.4* 8.9*   HCT 28.4* 26.8*    216     Recent Labs     04/28/21  0555 04/29/21  0456 04/30/21  0534   * 131* 130*   K 4.3 4.5 4.5   CL 96* 97* 95*   CO2 26 27 25   BUN 14 19 23*   CREATININE 1.0 1.0 1.0   CALCIUM 8.4 8.7 9.1   PHOS 3.8  --  4.7     No results for input(s): AST, ALT, BILIDIR, BILITOT, ALKPHOS in the last 72 hours. No results for input(s): INR in the last 72 hours. No results for input(s): Ethelene Soulier in the last 72 hours.     Urinalysis:      Lab Results   Component Value Date    NITRU Negative 04/26/2021    WBCUA 0-2 04/26/2021    BACTERIA 1+ 04/23/2021    RBCUA 3-4 04/26/2021    BLOODU TRACE-INTACT 04/26/2021    SPECGRAV 1.010 04/26/2021    GLUCOSEU Negative 04/26/2021    GLUCOSEU NEGATIVE 03/20/2010       Consults:    IP CONSULT TO CARDIOLOGY  IP CONSULT TO DIETITIAN      Assessment/Plan:    Active Hospital Problems    Diagnosis    Sacral wound [S31.000A]    NSTEMI (non-ST elevated myocardial infarction) ([facility] [I21.4]    Hypokalemia [E87.6]    Diabetes mellitus ([facility] [E11.9]    Anemia [D64.9]    Hyponatremia [E87.1]    GERD (gastroesophageal reflux disease) [K21.9]    Coronary artery disease [I25.10]    Essential hypertension [I10]       NSTEMI - acute NSTEMI on arrival s/p [facility] per Cardiology 26 April w/ multivessel CAD. CT Surgery consulted for CABG w/ plan to proceed, tentatively Monday 3 May. COPD - w/out chronic respiratory failure on no baseline home O2. Controlled on home medication regimen - continued. CTPA negative for PE but showed diffuse emphysematous changes in bilateral lower lungs.  Elevated procalcitonin on admission - started on empiric Rocephin 24 April - changed to DAILY dosing but no clinical evidence of PNA. Last dose 29 April - completed. HTN - w/out known CAD and no evidence of active signs/sxs of ischemia/failure. Currently controlled on home meds w/ vitals reviewed and documented as above.     DM2 - controlled on home Insulin - continued and titrated down 28 April. Follow FSBS/SSI medium regimen. Last HbA1c 7.8% dated this admission. Anticipate resuming/continuing home regimen at discharge.      Chronic Back Pain - Patient w/ Chronic Opioid dependence POArrival w/out signs/sxs of active abuse and or w/drawal. Continue current mgt w/ opioids as ordered and assess for efficacy. GERD - w/out active signs/sxs of dysphagia/odynophagia. No evidence of active PUD or hx of GI bleed. Controlled on home PPI - continue. HypoKalemia - etiology clinically unable to determine, possibly 2nd to diuretics. Follow serial labs and replace PRN. Reviewed and documented as above. Anemia - etiology clinically unable to determine, w/out evidence of active bleeding/hemolysis. Stable and asymptomatic w/out indication for transfusion. Follow serial labs.   Reviewed and documented as above.        DVT Prophylaxis: Heparin gtt, on ASA  Diet: DIET CARDIAC; Carb Control: 4 carb choices (60 gms)/meal  Dietary Nutrition Supplements: Wound Healing Oral Supplement, Diabetic Oral Supplement  Code Status: Full Code      PT/OT Eval Status: not yet ordered. Dispo - here for the foreseeable future pending CABG.        Manasa Morgan MD

## 2021-04-30 NOTE — PROGRESS NOTES
CVTS Thoracic Progress Note:          CC: Multivessel CAD    Subj: awake, sitting up in bed in NAD. Obj:    Blood pressure 111/67, pulse 80, temperature 98.8 °F (37.1 °C), temperature source Oral, resp. rate 16, height 5' 10\" (1.778 m), weight 160 lb 8 oz (72.8 kg), SpO2 98 %. Lungs CTAB   Abdomen soft, non-tender   UOP 2200 ml in 24 hrs; Cr 1.0    Diagnostics:   Recent Labs     21  0555 21  0534   WBC 6.2 6.3   HGB 9.4* 8.9*   HCT 28.4* 26.8*    216                                                                  Recent Labs     21  0555 21  0456 21  0534   * 131* 130*   K 4.3 4.5 4.5   CL 96* 97* 95*   CO2 26 27 25   BUN 14 19 23*   CREATININE 1.0 1.0 1.0   GLUCOSE 72 109* 186*     Recent Labs     21  0555   MG 1.80      Pre-op testing:   Carotid dopplers: 21   Right Impression   The right internal carotid artery reveals a <50% diameter reducing stenosis. The right vertebral artery demonstrates normal antegrade flow. The right subclavian artery is visualized and demonstrates multiphasic flow. Left Impression   The left internal carotid artery reveals a <50% diameter reducing stenosis. The left vertebral artery demonstrates normal antegrade flow. The left subclavian artery is visualized and demonstrates multiphasic flow. There is no previous exam for comparison. Vein mappin21    Summary        1. There is no evidence of superficial venous thrombosis involving the right    BELOW the knee greater saphenous vein.    2. There is evidence of chronic partially occluding superficial venous    thrombosis involving the left greater saphenous vein at the mid calf segment    and distal calf segment. Echo: 21 with Dr. Nathalia Chaudhari    Summary:    Normal left ventricle systolic function with an estimated ejection fraction of 55%. No regional wall motion abnormalities are seen. Normal left ventricular diastolic filling pressure.    Mild eccentric aortic regurgitation. Mild mitral and tricuspid regurgitation. Systolic pulmonary artery pressure (SPAP) is normal and estimated at 27 mmHg (right atrial pressure 3 mmHg). CXR: 4/23/21   Impression   Bilateral parahilar linear opacities could represent pulmonary edema or   infection     PFT's: 4/27/21 completed, on chart. Assess/Plan:   Care per primary team    Multivessel CAD:   pre-op testing completed. Continue with metabolization of Brilinta  Pt on heparin drip   PT/OT working with patient to help build strength prior to surgery. Will continue to follow post-op. Wound vacuum to remain in place when she goes to the OR for surgery. Will plan for the OR Monday 5/3/21. Pt having difficulty sleeping - continue melatonin QHS PRN  __________________________________________________________    Italo Steele CNP  4/30/2021  10:41 AM  Note reviewed, events of last 24 hours reviewed along with vital signs and I/Os and patient examined. Assessment and plans discussed and are as outlined above.      Shital Hatch MD, FACS, [facility], FACCP, [place]

## 2021-04-30 NOTE — PLAN OF CARE
04/30/21 1530   Negative Pressure Wound Therapy Sacrum   Placement Date/Time: 01/20/21 0959   Location: Sacrum   $ Standard NPWT <=50 sq cm PER TX $ Yes   Wound Type Pressure ulcer: Stage IV   Unit Type [facility] [id number]   Dressing Type Veraflo   Number of pieces used 1   Cycle Continuous   Target Pressure (mmHg) 125   Intensity 5   Irrigation Solution Vashe   Instillation Volume  26 mL   Soak Time  6 minutes   Vac Frequency 4 hours  (3.5 hrs)   Canister changed? Yes   Dressing Status Changed   Dressing Changed Changed/New   Drainage Amount Small   Drainage Description Clear   Dressing Change Due 05/04/21   Output (ml) 500 ml   Wound Assessment Red   Blanca-wound Assessment Maceration   Odor None     Removed current VAC dressing; cleaned with NS; patted dry; hydrocolloid around wound; barrier strip distal edge; framed wound with drape; 1 black foam; covered with drape; tracker pad attached and achieved seal; Veraflow 125 mmHg; 26ml Vashe; 6 minute instillation; every 3.5 hours. Next VAC change to be Tuesday 5/4 d/t surgery on 5/3.

## 2021-04-30 NOTE — PROGRESS NOTES
mild increase in BUN again today - torsemide dose decreased 4/29/21  7. Anemia: stable  8. Sacral ulcer, stage IV: Wound VAC in place      PLAN:  1. Continue torsemide 20 mg daily  2. Continue current doses of Toprol and Losartan  3. Continue ASA and statin  4. Continue IV Heparin until CABG  5.  Daily labs - consider holding torsemide if continued hyponatremia    Viki Baer, APRN - CNP, 4/30/2021, 1:25 PM  [facility]   [phone]       Telemetry: SR 70-80  NYHA: III    Physical Exam:  General:  Awake, alert, NAD  Skin:  Warm and dry  Neck:  JVP just above clavicle upright  Chest: Clear to auscultation  Cardiovascular:  RRR, normal S1S2, no MRG  Abdomen:  Soft, nontender, +bowel sounds  Extremities: Trace BLE nonpitting edema L > R; 1+ left hand edema      Medications:    [START ON 5/2/2021] bisacodyl  10 mg Rectal Once    [START ON 5/3/2021] aspirin  81 mg Oral Once    [START ON 5/3/2021] midazolam  2 mg Intravenous Once    losartan  25 mg Oral Daily    latanoprost  1 drop Both Eyes Daily    insulin glargine  20 Units Subcutaneous Nightly    insulin lispro  0-12 Units Subcutaneous TID WC    insulin lispro  0-6 Units Subcutaneous Nightly    torsemide  20 mg Oral Daily    gabapentin  300 mg Oral BID    morphine  15 mg Oral 2 times per day    aspirin EC  81 mg Oral Daily    atorvastatin  40 mg Oral Nightly    DULoxetine  60 mg Oral Daily    metoprolol succinate  25 mg Oral Daily    pantoprazole  40 mg Oral Daily    Roflumilast  500 mcg Oral Nightly    sodium chloride flush  5-40 mL Intravenous 2 times per day      [START ON 5/3/2021] insulin (HUMAN R) non-weight based infusion      [START ON 5/3/2021] sodium chloride      [START ON 5/3/2021] insulin (HUMAN R) non-weight based infusion      [START ON 5/3/2021] norepinephrine      heparin (PORCINE) Infusion 1,150 Units/hr (04/29/21 1477)    dextrose 100 mL/hr (04/26/21 4496)    vashe wound therapy      sodium chloride 25 mL (04/24/21 2037)       Lab Data: Lab results independently reviewed and analyzed by myself 4/28/21   CBC:   Recent Labs     04/28/21  0555 04/30/21  0534   WBC 6.2 6.3   HGB 9.4* 8.9*    216     BMP:    Recent Labs     04/28/21  0555 04/29/21  0456 04/30/21  0534   * 131* 130*   K 4.3 4.5 4.5   CO2 26 27 25   BUN 14 19 23*   CREATININE 1.0 1.0 1.0     INR:  No results for input(s): INR in the last 72 hours. BNP:  No results for input(s): PROBNP in the last 72 hours. Cardiac Enzymes: No results for input(s): TROPONINI in the last 72 hours. Lipids:   Lab Results   Component Value Date    TRIG 110 04/24/2021    TRIG 65 07/22/2010    HDL 30 04/24/2021    HDL 43 07/22/2010    LDLCALC 29 04/24/2021    LDLCALC 106 07/22/2010       Cardiac Imaging:    ECHO: 4/24/21 with Dr. Soumya Mixon    Summary:    Normal left ventricle systolic function with an estimated ejection fraction of 55%. No regional wall motion abnormalities are seen. Normal left ventricular diastolic filling pressure. Mild eccentric aortic regurgitation. Mild mitral and tricuspid regurgitation. Systolic pulmonary artery pressure (SPAP) is normal and estimated at 27 mmHg (right atrial pressure 3 mmHg). CARDIAC CATH 4/26/2021:  Procedures Performed:   1. Left heart catheterization  2. Selective left and right coronary angiogram  3. Left ventriculography    Procedure Findings:  1. Severe multi vessel coronary artery diease              ~not amenable to PCI  2. Normal left ventricular function with EF estimated at 55-60%  3. Normal left heart hemodynamics   Findings:   1. Left main coronary artery distal 70%. It gave off the left anterior descending artery and left circumflex. 2. Left anterior descending artery has severe atherosclerotic disease. 70% ostial. It was moderate in size. It gave off septal perforators and a moderate sized diagonal branch. The LAD covered the entire apex of the left ventricle.     3. Left circumflex has severe

## 2021-04-30 NOTE — PLAN OF CARE
Nutrition Problem #1: Increased nutrient needs  Intervention: Food and/or Nutrient Delivery: Continue Current Diet, Continue Oral Nutrition Supplement  Nutritional Goals: Pt will consume greater than 50% of meals and ONS this admission

## 2021-04-30 NOTE — ANESTHESIA PRE PROCEDURE
Department of Anesthesiology  Preprocedure Note       Name:  Marlon Diaz   Age:  72 y.o.  :  1950                                          MRN:  [id number]         Date:  2021      Surgeon: Zak Lan):  Rox Roe MD    Procedure: Procedure(s):  CABG CORONARY ARTERY BYPASS WITH LEFT ATRIAL APPENDAGE CLIP    Medications prior to admission:   Prior to Admission medications    Medication Sig Start Date End Date Taking? Authorizing Provider   oxyCODONE-acetaminophen (PERCOCET)  MG per tablet Take 1 tablet by mouth 2 times daily. Yes Historical Provider, MD   metroNIDAZOLE (FLAGYL) 250 MG tablet Crush one tablet into a fine powder, sprinkle in wound three times per week with dressing changes, as needed for malodor. 21  Yes Brennen Gaytan MD   docusate sodium (COLACE) 100 MG capsule Take 100 mg by mouth 2 times daily as needed for Constipation   Yes Historical Provider, MD   DULoxetine (CYMBALTA) 60 MG extended release capsule Take 60 mg by mouth daily   Yes Historical Provider, MD   gabapentin (NEURONTIN) 300 MG capsule Take 300 mg by mouth 2 times daily. Yes Historical Provider, MD   latanoprost (XALATAN) 0.005 % ophthalmic solution Place 1 drop into both eyes nightly   Yes Historical Provider, MD   polyethylene glycol (GLYCOLAX) 17 g packet Take 17 g by mouth daily as needed for Constipation   Yes Historical Provider, MD   Teriparatide, Recombinant, (FORTEO) 600 MCG/2.4ML SOPN injection Inject 20 mcg into the skin daily   Yes Historical Provider, MD   torsemide (DEMADEX) 20 MG tablet Take 40 mg by mouth daily   Yes Historical Provider, MD   azithromycin (ZITHROMAX) 250 MG tablet TAKE 1 TABLET BY MOUTH EVERY DAY 20  Yes Dea Salinas MD   morphine (MS CONTIN) 15 MG extended release tablet 15 mg 2 times daily.   10/16/20  Yes Historical Provider, MD   ipratropium (ATROVENT) 0.06 % nasal spray USE 2 SPRAYS BY NASAL ROUTE 2-4 TIMES DAILY 10/29/20  Yes Dea Salinas MD Roflumilast (DALIRESP) 500 MCG tablet Take 1 tablet by mouth daily 5/19/20  Yes Demetris White MD   albuterol sulfate  (90 Base) MCG/ACT inhaler INHALE 2 PUFFS INTO THE LUNGS EVERY 4 HOURS AS NEEDED FOR WHEEZING 1/20/20  Yes Pabilto eGrber MD   vitamin D (CHOLECALCIFEROL) 1000 UNIT TABS tablet Take 5,000 Units by mouth daily    Yes Historical Provider, MD   calcium carbonate (OSCAL) 500 MG TABS tablet Take 500 mg by mouth daily   Yes Historical Provider, MD   atorvastatin (LIPITOR) 40 MG tablet Take 40 mg by mouth 10/16/18  Yes Historical Provider, MD   cyclobenzaprine (FLEXERIL) 10 MG tablet Take 10 mg by mouth 2 times daily as needed    Yes Historical Provider, MD   Insulin Syringe-Needle U-100 31G X 5/16\" 0.5 ML MISC USE 5 TIMES DAILY 5/30/18  Yes Historical Provider, MD   meloxicam (MOBIC) 15 MG tablet Patient states taking only as needed 4/7/18  Yes Historical Provider, MD   metoprolol succinate (TOPROL XL) 25 MG extended release tablet Take 25 mg by mouth daily   Yes Historical Provider, MD   insulin glargine (LANTUS) 100 UNIT/ML injection vial Inject 30 Units into the skin nightly Per pt, takes 20 or 30u nightly 10/23/17  Yes Juan Celis MD   aspirin EC 81 MG EC tablet Take 1 tablet by mouth daily 10/23/17  Yes Juan Celis MD   insulin lispro (HUMALOG) 100 UNIT/ML injection vial Inject 0-12 Units into the skin 3 times daily (with meals) 10/23/17  Yes Juan Celis MD   fluticasone (FLONASE) 50 MCG/ACT nasal spray INHALE 2 SPRAYS IN EACH NOSTRIL DAILY 11/25/13  Yes Demetris White MD   omeprazole (PRILOSEC) 40 MG capsule Take 40 mg by mouth daily    Yes Historical Provider, MD   losartan (COZAAR) 50 MG tablet Take 50 mg by mouth daily    Historical Provider, MD   NARCAN 4 MG/0.1ML LIQD nasal spray PLEASE SEE ATTACHED FOR DETAILED DIRECTIONS 10/20/20   Historical Provider, MD   ACCU-CHEK CEDRICK PLUS strip TEST 4 TIMES DAILY 6/1/18   Historical Provider, MD   Misc.  Devices (ACAPELLA) MISC Take 1 Device by mouth as needed 9/2/15   LORENZO Elkins CNP   cetirizine (ZYRTEC) 10 MG tablet Take 10 mg by mouth daily. Historical Provider, MD   etanercept (ENBREL) 50 MG/ML injection Inject 50 mg into the skin every 7 days.     Historical Provider, MD       Current medications:    Current Facility-Administered Medications   Medication Dose Route Frequency Provider Last Rate Last Admin    [START ON 5/3/2021] insulin regular (HUMULIN R;NOVOLIN R) 100 Units in sodium chloride 0.9 % 100 mL infusion  0.5 Units/hr Intravenous On Call LORENZO Ortega CNP        [START ON 5/2/2021] bisacodyl (DULCOLAX) suppository 10 mg  10 mg Rectal Once LORENZO Ortega CNP        [START ON 5/3/2021] aspirin EC tablet 81 mg  81 mg Oral Once LORENZO Ortega CNP        [START ON 5/3/2021] 0.9 % sodium chloride infusion   Intravenous Continuous LORENZO Ortega CNP        losartan (COZAAR) tablet 25 mg  25 mg Oral Daily [facility], APRN - CNP   25 mg at 04/30/21 9862    melatonin disintegrating tablet 5 mg  5 mg Oral Nightly PRN LORENZO Ortega CNP        latanoprost (XALATAN) 0.005 % ophthalmic solution 1 drop  1 drop Both Eyes Daily Godwin Posada MD   1 drop at 04/29/21 0929    heparin 25,000 units in dextrose 5% 250 mL (premix) infusion  1,150 Units/hr Intravenous Continuous Godwin Posada MD 11.5 mL/hr at 04/29/21 0929 1,150 Units/hr at 04/29/21 0929    insulin glargine (LANTUS) injection vial 20 Units  20 Units Subcutaneous Nightly Godwin Posada MD   20 Units at 04/29/21 2039    insulin lispro (HUMALOG) injection vial 0-12 Units  0-12 Units Subcutaneous TID WC Godwin Posada MD   2 Units at 04/30/21 0835    insulin lispro (HUMALOG) injection vial 0-6 Units  0-6 Units Subcutaneous Nightly Godwin Posada MD   2 Units at 04/29/21 2039    torsemide (DEMADEX) tablet 20 mg  20 mg Oral Daily LORENZO Hall CNP   20 mg at 04/30/21 0864    dextrose 5 % solution  100 mL/hr Intravenous PRN Jenni Parsons  mL/hr at 04/26/21 0459 100 mL/hr at 04/26/21 0459    hydrALAZINE (APRESOLINE) injection 10 mg  10 mg Intravenous Q10 Min PRN Jenni Parsons MD        vashe wound therapy external solution   Irrigation Continuous PRN Wilfred Hawthorne MD        gabapentin (NEURONTIN) capsule 300 mg  300 mg Oral BID Jenni Parsons MD   300 mg at 04/30/21 0834    oxyCODONE-acetaminophen (PERCOCET)  MG per tablet 1 tablet  1 tablet Oral BID PRN Jenni Parsons MD   1 tablet at 04/30/21 0056    morphine (MS CONTIN) extended release tablet 15 mg  15 mg Oral 2 times per day Jenni Parsons MD   15 mg at 04/30/21 0834    albuterol sulfate  (90 Base) MCG/ACT inhaler 2 puff  2 puff Inhalation Q4H PRN Jenni Parsons MD        aspirin EC tablet 81 mg  81 mg Oral Daily Jenni Parsons MD   81 mg at 04/30/21 0834    atorvastatin (LIPITOR) tablet 40 mg  40 mg Oral Nightly Jenni Parsons MD   40 mg at 04/29/21 2039    cyclobenzaprine (FLEXERIL) tablet 10 mg  10 mg Oral BID MARTINN Jenni Parsons MD   10 mg at 04/30/21 0616    DULoxetine (CYMBALTA) extended release capsule 60 mg  60 mg Oral Daily Jenni Parsons MD   60 mg at 04/30/21 0834    metoprolol succinate (TOPROL XL) extended release tablet 25 mg  25 mg Oral Daily Jenni Parsons MD   25 mg at 04/30/21 0834    pantoprazole (PROTONIX) tablet 40 mg  40 mg Oral Daily Jenni Parsons MD   40 mg at 04/30/21 0834    Roflumilast (DALIRESP) tablet 500 mcg  500 mcg Oral Nightly Jenni Parsons MD   500 mcg at 04/29/21 2039    heparin (porcine) injection 2,000 Units  2,000 Units Intravenous PRN Jenni Parsons MD        heparin (porcine) injection 4,000 Units  4,000 Units Intravenous PRN Jenni Parsons MD   4,000 Units at 04/24/21 0624    sodium chloride flush 0.9 % injection 5-40 mL  5-40 mL Intravenous 2 times per day Jenni Parsons MD   10 mL at 04/29/21 2040    sodium chloride flush 0.9 % injection 5-40 mL  5-40 mL Intravenous PRN Phelps Estimable, MD   10 mL at 04/24/21 1151    0.9 % sodium chloride infusion  25 mL Intravenous PRN Phelps Estimable,  mL/hr at 04/24/21 2037 25 mL at 04/24/21 2037    promethazine (PHENERGAN) tablet 12.5 mg  12.5 mg Oral Q6H PRN Phelps Estimable, MD        Or    [facility] injection 4 mg  4 mg Intravenous Q6H PRN Phelps Estimable, MD   4 mg at 04/24/21 1151    acetaminophen (TYLENOL) tablet 650 mg  650 mg Oral Q6H PRN Phelps Estimable, MD   650 mg at 04/27/21 0429    Or    acetaminophen (TYLENOL) suppository 650 mg  650 mg Rectal Q6H PRN Phelps Estimable, MD        polyethylene glycol (GLYCOLAX) packet 17 g  17 g Oral Daily PRN Phelps Estimable, MD        perflutren lipid microspheres (DEFINITY) injection 1.65 mg  1.5 mL Intravenous ONCE PRN Phelps Estimable, MD        nitroGLYCERIN (NITROSTAT) SL tablet 0.4 mg  0.4 mg Sublingual Q5 Min PRN Phelps Estimable, MD        glucose (GLUTOSE) 40 % oral gel 15 g  15 g Oral PRN Phelps Estimable, MD        dextrose 50 % IV solution  12.5 g Intravenous PRN Phelps Estimable, MD   12.5 g at 04/26/21 0453    glucagon (rDNA) injection 1 mg  1 mg Intramuscular PRN Phelps Estimable, MD           Allergies:     Allergies   Allergen Reactions    Atenolol      Cough         Problem List:    Patient Active Problem List   Diagnosis Code    Rheumatoid arthritis ([facility] M06.9    Psoriasis L40.9    GERD (gastroesophageal reflux disease) K21.9    Hyponatremia E87.1    Anemia D64.9    Cylindrical bronchiectasis (HCC) J47.9    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    Immunocompromised state ([facility] D84.9    Hypokalemia E87.6    Coronary artery disease I25.10    Stasis edema of both lower extremities I87.303    Essential hypertension I10    Lumbar spondylosis M47.816    Mitral valve insufficiency and aortic valve insufficiency I08.0    Mixed hyperlipidemia E78.2    Myopia of both eyes H52.13    Osteoporosis M81.0    Other chronic sinusitis J32.8    Primary open angle glaucoma (POAG) of both eyes, mild stage H40.1131    Primary osteoarthritis of right hip M16.11    Diabetes mellitus ([address] E11.9    Type 2 diabetes mellitus with unspecified diabetic retinopathy without macular edema ([facility]) E11.319    Uncontrolled type 2 diabetes mellitus with diabetic peripheral angiopathy without gangrene, with long-term current use of insulin ([facility]) E11.51, E11.65, Z79.4    Pressure ulcer of coccygeal region, stage 4 ([facility]) L89.154    NSTEMI (non-ST elevated myocardial infarction) ([address] I21.4    Sacral wound S31.000A       Past Medical History:        Diagnosis Date    Atherosclerosis of native artery of right lower extremity with rest pain ([address] 07/25/2017    Back pain     Branch retinal vein occlusion 07/20/2012    Bronchiectasis with acute exacerbation ([facility])     Closed compression fracture of thoracic vertebra ([address] 01/15/2020    Closed fracture of facial bone with routine healing 11/21/2016    Closed jaw fracture ([address] 01/15/2020    Community acquired pneumonia of left lower lobe of lung     Compression fracture of L1 lumbar vertebra ([address] 01/15/2020    COPD (chronic obstructive pulmonary disease) ([facility])     Fracture of tibial plateau, closed, left, initial encounter 12/05/2017    Minimally displaced zone I fracture of sacrum ([facility]) 09/02/2020    Mucus plugging of bronchi     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 Bone density test last year showed severe osteoporosis. Recently, two broken vertebrae (L1, L2) due to coughing. Diagnosed with tracheomalacia and stated she must cough very hard to clear phlegm. Was coughing due to upper respiratory infections which have been treated. Hx of laminectomy and recent kyphoplasty.    Refractured her jawbone which was previously repaired wi (+) pneumonia: resolved,  COPD: moderate,  sleep apnea:                             Cardiovascular:    (+) hypertension:, valvular problems/murmurs: MR and AI, past MI:, CAD: obstructive, hyperlipidemia                  Neuro/Psych:               GI/Hepatic/Renal:   (+) GERD:,           Endo/Other:    (+) DiabetesType II DM, , : arthritis: rheumatoid. , .                 Abdominal:           Vascular:                                      Anesthesia Plan      general     ASA 4     [facility]  PRE-ANESTHESIA EVALUATION FORM       Name:  Stephane Rossi                                         Age:  72 y.o. MRN:  [id number]           I discussed intravenous with inhalational endotracheal anesthesia with pulmonary artery catheter, radial ( or other artery if required) catheter, possible transesophageal echocardiography and post-operative ventilation including risks and alternatives with the patient . The patient has no further questions. Elias Carvajal MD  April 30, 2021  11:45 AM)  Induction: inhalational and intravenous. arterial line, BIS, central line, CVP and ABHISHEK    Anesthetic plan and risks discussed with patient.                       Elias Carvajal MD   4/30/2021

## 2021-04-30 NOTE — PROGRESS NOTES
Comprehensive Nutrition Assessment    Type and Reason for Visit:  Reassess    Nutrition Recommendations/Plan:   1. Continue Cardiac, Carb control diet as tolerated  2. Continue Glucerna and Jimbo nutrition supplements, drinking consistently  3. Will monitor nutritional adequacy, nutrition-related labs, weights, BMs, and clinical progress     Nutrition Assessment:  Follow up:  Currently on a cardiac, carb control diet with Glucerna and Jimbo nutrition supplements as tolerated eating % of meals and supplements. Patient ate all of lunch, sitting up in chair, awake and pleasant. Patient is drinking Glucerna and Jimbo, adding unflavored Jimbo to different drinks. Patient does not like the taste changes it creates but continues to experiment. RD suggested adding the orange Jimbo to the vanilla Glucerna like a creamsicle. Will continue to monitor. Malnutrition Assessment:  Malnutrition Status: At risk for malnutrition (Comment)    Context:  Acute Illness       Estimated Daily Nutrient Needs:  Energy (kcal):  8970-4239 kcal; Weight Used for Energy Requirements:  Current(73 kg)     Protein (g):  73-88 g; Weight Used for Protein Requirements:  Current(1.0-1.2 g/kg)        Fluid (ml/day):   ; Method Used for Fluid Requirements:  1 ml/kcal      Nutrition Related Findings:  BM 0-1 daily;  Na 130 mmol/L (? due to elevated blood sugars)      Wounds:  Pressure Injury, Stage IV, Wound Vac       Current Nutrition Therapies:    DIET CARDIAC; Carb Control: 4 carb choices (60 gms)/meal  Dietary Nutrition Supplements: Wound Healing Oral Supplement, Diabetic Oral Supplement  Diet NPO Time Specified  Diet NPO Effective Now    Anthropometric Measures:  · Height: 5' 10\" (177.8 cm)  · Current Body Weight: 160 lb (72.6 kg)   · Admission Body Weight:      · Usual Body Weight: (165-170 lb per EMR weight hx)     · Ideal Body Weight: 150 lbs; % Ideal Body Weight 106.7 %   · BMI: 23  · Adjusted Body Weight:  ; No Adjustment · Adjusted BMI:      · BMI Categories: Normal Weight (BMI 22.0 to 24.9) age over 72       Nutrition Diagnosis:   · Increased nutrient needs related to inadequate protein-energy intake, increase demand for energy/nutrients as evidenced by poor intake prior to admission, weight loss, wounds    Nutrition Interventions:   Food and/or Nutrient Delivery:  Continue Current Diet, Continue Oral Nutrition Supplement  Nutrition Education/Counseling:  No recommendation at this time   Coordination of Nutrition Care:  Continue to monitor while inpatient    Goals:  Pt will consume greater than 50% of meals and ONS this admission       Nutrition Monitoring and Evaluation:   Behavioral-Environmental Outcomes:  None Identified   Food/Nutrient Intake Outcomes:  Diet Advancement/Tolerance, Food and Nutrient Intake, Supplement Intake  Physical Signs/Symptoms Outcomes:  Biochemical Data, Nutrition Focused Physical Findings, Weight     Discharge Planning:     Too soon to determine     Electronically signed by Janay Chew, [address] [place] [address],  on 4/30/21 at 2:29 PM EDT    Contact: Office: [phone]; [address] [place] [address] Road: [phone]

## 2021-04-30 NOTE — PROGRESS NOTES
Occupational Therapy  Facility/Department: [facility] C4 PCU  Daily Treatment Note  NAME: Marlon Diaz  : 1950  MRN: [id number]    Date of Service: 2021    Discharge Recommendations:  24 hour supervision or assist, Home with Home health OT       Assessment   Performance deficits / Impairments: Decreased functional mobility ; Decreased ADL status; Decreased ROM; Decreased endurance;Decreased high-level IADLs  Assessment: pt progessing with bathroom /functional mobility with CGA with RW, SBA standing ADL's, ADL's limited by IV, wound vac & edema Left hand; pt continues to benefit from skilled OT services  OT Education: OT Role;Plan of Care;Precautions  Patient Education: disease specific: OT role, pressure relief, importance of mobility, use of RED/Nurse call light for A with transfers/ADL needs  REQUIRES OT FOLLOW UP: Yes  Activity Tolerance  Activity Tolerance: Patient Tolerated treatment well  Safety Devices  Safety Devices in place: Yes  Type of devices: Left in chair;Call light within reach;Nurse notified         Patient Diagnosis(es): There were no encounter diagnoses.       has a past medical history of Atherosclerosis of native artery of right lower extremity with rest pain ([address] Back pain, Branch retinal vein occlusion, Bronchiectasis with acute exacerbation (HCC), Closed compression fracture of thoracic vertebra ([address] Closed fracture of facial bone with routine healing, Closed jaw fracture ([address] Community acquired pneumonia of left lower lobe of lung, Compression fracture of L1 lumbar vertebra ([address] COPD (chronic obstructive pulmonary disease) ([address] Fracture of tibial plateau, closed, left, initial encounter, Minimally displaced zone I fracture of sacrum (HCC), Mucus plugging of bronchi, Osteomyelitis of mandible, Osteoporosis with pathological fracture, Post herpetic neuralgia, Proximal humerus fracture, Rheumatoid arthritis ([address] Shingles, Sleep apnea, Temporal arteritis ([address] Tobacco use, Tracheomalacia, and Vitreous hemorrhage, right eye ([facility] [address] has a past surgical history that includes hernia repair; Mandible fracture surgery; Foot surgery; Elbow surgery; Cataract removal; Tubal ligation; Knee arthroscopy; Kyphosis surgery; laminectomy; Spinal fusion; Septoplasty (5/7/2013); Artery surgery (5/30/13); Upper gastrointestinal endoscopy (4/8/2014); bronchoscopy; bronchoscopy (N/A, 6/12/2019); Mandible fracture surgery (02/2020); back surgery (08/2020); other surgical history (01/08/2021); Pressure ulcer debridement (N/A, 1/8/2021); and Artery Biopsy (Right, 03/01/2021). Restrictions  Restrictions/Precautions  Restrictions/Precautions: Fall Risk, General Precautions, Up as Tolerated  Position Activity Restriction  Other position/activity restrictions: sacral wound vac, IV  Subjective   General  Chart Reviewed: Yes  Patient assessed for rehabilitation services?: Yes  Family / Caregiver Present: No  Referring Practitioner: Ana María Lozada NP  Diagnosis: NSTEMI  Subjective  Subjective: Pt supine, pleasant and cooperative. General Comment  Comments: RN cleared pt for OT; pt resting in bed, agreeable to OT  Pain Assessment  Pain Assessment: Faces  Tripathi-Baker Pain Rating: Hurts a little bit  Pain Type: Chronic pain  Pain Location: Buttocks  Functional Pain Assessment: Prevents or interferes some active activities and ADLs  Non-Pharmaceutical Pain Intervention(s): Ambulation/Increased Activity; Therapeutic presence;Repositioned  Pre Treatment Pain Screening  Intervention List: Patient able to continue with treatment  Vital Signs  Patient Currently in Pain: Yes   Orientation  Orientation  Overall Orientation Status: Within Functional Limits  Objective    ADL  Grooming: Stand by assistance(standing at sink to wash hands after toileting)  Toileting: Supervision        Balance  Sitting Balance: Independent  Standing Balance: Contact guard assistance(with RW)  Standing Balance  Time: 1-2 minutes x 2  Activity: bathroom mobility  Functional Mobility  Functional - Mobility Device: Rolling Walker  Activity: To/from bathroom  Assist Level: Contact guard assistance  Toilet Transfers  Toilet - Technique: Ambulating(CGA with RW)  Equipment Used: Grab bars  Toilet Transfer: Stand by assistance  Bed mobility  Supine to Sit: Modified independent  Sit to Supine: Unable to assess(Left up in chair with waffle cushion)  Transfers  Sit to stand: Stand by assistance  Stand to sit: Stand by assistance                       Cognition  Overall Cognitive Status: WFL       Type of ROM/Therapeutic Exercise: AROM  Comment: BUE  Hand flex/ext: x  10  Reps  Wrist flex/ext:  X  10 Reps  Elbow flex/ext:  x  10  Reps  Forearm sup/pron:  x  10  Reps  Shld abd/add:  x   10 Reps to 90*      LUE General AROM: decreased hand flexion/wrist AROM due to mild edema distally; WFL shoulder-->forearm  RUE AROM : WFL                 Plan   Plan  Times per week: 3-5x/wk  Current Treatment Recommendations: ROM, Balance Training, Functional Mobility Training, Endurance Training, Self-Care / ADL, Safety Education & Training    -PAC Score        -[facility] Inpatient Daily Activity Raw Score: 20 (04/30/21 1205)  AM-PAC Inpatient ADL T-Scale Score : 42.03 (04/30/21 1205)  ADL Inpatient CMS 0-100% Score: 38.32 (04/30/21 1205)  ADL Inpatient CMS G-Code Modifier : Lorena Andrews (04/30/21 1205)    Goals  Short term goals  Time Frame for Short term goals: 1 week by 5/5  Short term goal 1: Pt will complete LB dressing with SPV  Short term goal 2: Pt will complete functional transfers with SPV; 4/30 SBA with functional/toilet transfers  Short term goal 3: Pt will tolerate B UE ex x 20 reps w/o fatigue; 4/30 10 reps BUE due to pain, edema Left hand  Short term goal 4: Pt will complete toileting with SPV; 4/30 STG met  Patient Goals   Patient goals : \"get back to my life after surgery\"       Therapy Time   Individual Concurrent Group Co-treatment   Time In 1057         Time Out 1124 Minutes [address], [place]

## 2021-05-01 LAB
ABO/RH: NORMAL
ALBUMIN SERPL-MCNC: 3 G/DL (ref 3.4–5)
ANION GAP SERPL CALCULATED.3IONS-SCNC: 13 MMOL/L (ref 3–16)
ANTIBODY SCREEN: NORMAL
APTT: 59.2 SEC (ref 24.2–36.2)
BUN BLDV-MCNC: 25 MG/DL (ref 7–20)
CALCIUM SERPL-MCNC: 9.3 MG/DL (ref 8.3–10.6)
CHLORIDE BLD-SCNC: 94 MMOL/L (ref 99–110)
CO2: 25 MMOL/L (ref 21–32)
CREAT SERPL-MCNC: 1 MG/DL (ref 0.6–1.2)
GFR AFRICAN AMERICAN: >60
GFR NON-AFRICAN AMERICAN: 55
GLUCOSE BLD-MCNC: 163 MG/DL (ref 70–99)
GLUCOSE BLD-MCNC: 227 MG/DL (ref 70–99)
GLUCOSE BLD-MCNC: 448 MG/DL (ref 70–99)
GLUCOSE BLD-MCNC: 525 MG/DL (ref 70–99)
HCT VFR BLD CALC: 28.7 % (ref 36–48)
HEMOGLOBIN: 9.6 G/DL (ref 12–16)
MAGNESIUM: 2 MG/DL (ref 1.8–2.4)
MCH RBC QN AUTO: 27.9 PG (ref 26–34)
MCHC RBC AUTO-ENTMCNC: 33.4 G/DL (ref 31–36)
MCV RBC AUTO: 83.7 FL (ref 80–100)
PDW BLD-RTO: 15.6 % (ref 12.4–15.4)
PERFORMED ON: ABNORMAL
PHOSPHORUS: 4.6 MG/DL (ref 2.5–4.9)
PLATELET # BLD: 238 K/UL (ref 135–450)
PMV BLD AUTO: 8 FL (ref 5–10.5)
POTASSIUM SERPL-SCNC: 4.8 MMOL/L (ref 3.5–5.1)
RBC # BLD: 3.43 M/UL (ref 4–5.2)
SODIUM BLD-SCNC: 132 MMOL/L (ref 136–145)
WBC # BLD: 5.7 K/UL (ref 4–11)

## 2021-05-01 PROCEDURE — 99232 SBSQ HOSP IP/OBS MODERATE 35: CPT | Performed by: THORACIC SURGERY (CARDIOTHORACIC VASCULAR SURGERY)

## 2021-05-01 PROCEDURE — 6370000000 HC RX 637 (ALT 250 FOR IP): Performed by: INTERNAL MEDICINE

## 2021-05-01 PROCEDURE — 97605 NEG PRS WND THER DME<=50SQCM: CPT

## 2021-05-01 PROCEDURE — 6370000000 HC RX 637 (ALT 250 FOR IP): Performed by: NURSE PRACTITIONER

## 2021-05-01 PROCEDURE — P9016 RBC LEUKOCYTES REDUCED: HCPCS

## 2021-05-01 PROCEDURE — 2580000003 HC RX 258: Performed by: INTERNAL MEDICINE

## 2021-05-01 PROCEDURE — 86850 RBC ANTIBODY SCREEN: CPT

## 2021-05-01 PROCEDURE — 85730 THROMBOPLASTIN TIME PARTIAL: CPT

## 2021-05-01 PROCEDURE — 6370000000 HC RX 637 (ALT 250 FOR IP): Performed by: HOSPITALIST

## 2021-05-01 PROCEDURE — 86923 COMPATIBILITY TEST ELECTRIC: CPT

## 2021-05-01 PROCEDURE — 86901 BLOOD TYPING SEROLOGIC RH(D): CPT

## 2021-05-01 PROCEDURE — 97116 GAIT TRAINING THERAPY: CPT

## 2021-05-01 PROCEDURE — 6360000002 HC RX W HCPCS: Performed by: INTERNAL MEDICINE

## 2021-05-01 PROCEDURE — 36415 COLL VENOUS BLD VENIPUNCTURE: CPT

## 2021-05-01 PROCEDURE — 6360000002 HC RX W HCPCS: Performed by: HOSPITALIST

## 2021-05-01 PROCEDURE — 80069 RENAL FUNCTION PANEL: CPT

## 2021-05-01 PROCEDURE — 2060000000 HC ICU INTERMEDIATE R&B

## 2021-05-01 PROCEDURE — 2580000003 HC RX 258: Performed by: HOSPITALIST

## 2021-05-01 PROCEDURE — 86900 BLOOD TYPING SEROLOGIC ABO: CPT

## 2021-05-01 PROCEDURE — 85027 COMPLETE CBC AUTOMATED: CPT

## 2021-05-01 PROCEDURE — 97530 THERAPEUTIC ACTIVITIES: CPT

## 2021-05-01 PROCEDURE — 83735 ASSAY OF MAGNESIUM: CPT

## 2021-05-01 PROCEDURE — 83540 ASSAY OF IRON: CPT

## 2021-05-01 RX ORDER — INSULIN GLARGINE 100 [IU]/ML
25 INJECTION, SOLUTION SUBCUTANEOUS NIGHTLY
Status: DISCONTINUED | OUTPATIENT
Start: 2021-05-01 | End: 2021-05-01

## 2021-05-01 RX ORDER — INSULIN GLARGINE 100 [IU]/ML
22 INJECTION, SOLUTION SUBCUTANEOUS NIGHTLY
Status: DISCONTINUED | OUTPATIENT
Start: 2021-05-01 | End: 2021-05-02

## 2021-05-01 RX ADMIN — OXYCODONE AND ACETAMINOPHEN 1 TABLET: 10; 325 TABLET ORAL at 15:22

## 2021-05-01 RX ADMIN — MORPHINE SULFATE 15 MG: 15 TABLET, FILM COATED, EXTENDED RELEASE ORAL at 20:24

## 2021-05-01 RX ADMIN — IRON SUCROSE 100 MG: 20 INJECTION, SOLUTION INTRAVENOUS at 16:36

## 2021-05-01 RX ADMIN — HEPARIN SODIUM 11.5 ML/HR: 10000 INJECTION, SOLUTION INTRAVENOUS at 11:22

## 2021-05-01 RX ADMIN — LOSARTAN POTASSIUM 25 MG: 25 TABLET, FILM COATED ORAL at 08:37

## 2021-05-01 RX ADMIN — ATORVASTATIN CALCIUM 40 MG: 40 TABLET, FILM COATED ORAL at 20:23

## 2021-05-01 RX ADMIN — INSULIN LISPRO 4 UNITS: 100 INJECTION, SOLUTION INTRAVENOUS; SUBCUTANEOUS at 08:44

## 2021-05-01 RX ADMIN — ASPIRIN 81 MG: 81 TABLET, COATED ORAL at 08:38

## 2021-05-01 RX ADMIN — GABAPENTIN 300 MG: 300 CAPSULE ORAL at 20:23

## 2021-05-01 RX ADMIN — METOPROLOL SUCCINATE 25 MG: 25 TABLET, EXTENDED RELEASE ORAL at 08:39

## 2021-05-01 RX ADMIN — DULOXETINE HYDROCHLORIDE 60 MG: 60 CAPSULE, DELAYED RELEASE ORAL at 08:38

## 2021-05-01 RX ADMIN — ROFLUMILAST 500 MCG: 500 TABLET ORAL at 20:23

## 2021-05-01 RX ADMIN — SODIUM CHLORIDE, PRESERVATIVE FREE 10 ML: 5 INJECTION INTRAVENOUS at 08:38

## 2021-05-01 RX ADMIN — MORPHINE SULFATE 15 MG: 15 TABLET, FILM COATED, EXTENDED RELEASE ORAL at 08:38

## 2021-05-01 RX ADMIN — INSULIN LISPRO 6 UNITS: 100 INJECTION, SOLUTION INTRAVENOUS; SUBCUTANEOUS at 20:24

## 2021-05-01 RX ADMIN — GABAPENTIN 300 MG: 300 CAPSULE ORAL at 08:38

## 2021-05-01 RX ADMIN — INSULIN GLARGINE 22 UNITS: 100 INJECTION, SOLUTION SUBCUTANEOUS at 20:23

## 2021-05-01 RX ADMIN — LATANOPROST 1 DROP: 50 SOLUTION OPHTHALMIC at 08:39

## 2021-05-01 RX ADMIN — PANTOPRAZOLE SODIUM 40 MG: 40 TABLET, DELAYED RELEASE ORAL at 08:38

## 2021-05-01 RX ADMIN — INSULIN LISPRO 12 UNITS: 100 INJECTION, SOLUTION INTRAVENOUS; SUBCUTANEOUS at 16:36

## 2021-05-01 RX ADMIN — TORSEMIDE 20 MG: 20 TABLET ORAL at 08:38

## 2021-05-01 ASSESSMENT — PAIN DESCRIPTION - PAIN TYPE: TYPE: CHRONIC PAIN

## 2021-05-01 ASSESSMENT — PAIN SCALES - GENERAL
PAINLEVEL_OUTOF10: 0
PAINLEVEL_OUTOF10: 9

## 2021-05-01 NOTE — PROGRESS NOTES
Osteomyelitis of mandible, Osteoporosis with pathological fracture, Post herpetic neuralgia, Proximal humerus fracture, Rheumatoid arthritis ([address] Shingles, Sleep apnea, Temporal arteritis ([address] Tobacco use, Tracheomalacia, and Vitreous hemorrhage, right eye ([address] has a past surgical history that includes hernia repair; Mandible fracture surgery; Foot surgery; Elbow surgery; Cataract removal; Tubal ligation; Knee arthroscopy; Kyphosis surgery; laminectomy; Spinal fusion; Septoplasty (5/7/2013); Artery surgery (5/30/13); Upper gastrointestinal endoscopy (4/8/2014); bronchoscopy; bronchoscopy (N/A, 6/12/2019); Mandible fracture surgery (02/2020); back surgery (08/2020); other surgical history (01/08/2021); Pressure ulcer debridement (N/A, 1/8/2021); and Artery Biopsy (Right, 03/01/2021). Restrictions  Restrictions/Precautions  Restrictions/Precautions: Fall Risk, General Precautions, Up as Tolerated  Position Activity Restriction  Other position/activity restrictions: sacral wound vac, IV     Subjective   General  Chart Reviewed: Yes  Response To Previous Treatment: Patient with no complaints from previous session. Family / Caregiver Present: No  Subjective  Subjective: Patient resting supine in bed upon therapy arrival.  Patient agreeable to therapy session. General Comment  Comments: RN cleared pt for therapy session.   Pain Screening  Patient Currently in Pain: Yes  Vital Signs  Patient Currently in Pain: Yes       Orientation  Orientation  Overall Orientation Status: Within Normal Limits     Cognition   Cognition  Overall Cognitive Status: WFL     Objective   Bed mobility  Supine to Sit: Modified independent(HOB elevated)  Sit to Supine: Unable to assess(pt seated in chair at end of session)     Transfers  Sit to Stand: Stand by assistance  Stand to sit: Supervision     Ambulation  Ambulation?: Yes  Ambulation 1  Surface: level tile  Device: Rolling Walker  Assistance: Stand by assistance  Quality of Gait: slow pace, mild flexed posture, therapist managed IV pole and wound vac,  Gait Deviations: Slow Lilia;Decreased step length;Decreased step height  Distance: 20+25 ft  Stairs/Curb  Stairs?: No    AM-PAC Score  AM-PAC Inpatient Mobility Raw Score : 19 (05/01/21 1135)  AM-PAC Inpatient T-Scale Score : 45.44 (05/01/21 1135)  Mobility Inpatient CMS 0-100% Score: 41.77 (05/01/21 1135)  Mobility Inpatient CMS G-Code Modifier : CK (05/01/21 1135)          Goals  Short term goals  Time Frame for Short term goals: 1 week (5/4) unless otherwise specified  Short term goal 1: pt to perform supine to and from sit without use of UEs -4/29 NT  Short term goal 2: Pt to transfer sit to and from stand without use of UEs- MET  -4/29 min A from chair  Short term goal 3: pt to amb 150 ft with RW and supervision -4/29 sba/S 90' rw  Short term goal 4: pt to participate in LE Ex 10 x each by 4/30- GOAL MET. participates in 15 reps of LE ther ex 4/29  Patient Goals   Patient goals : \"to get up and walk with RW, to start preparing for CABG surgery next wk\"    Plan    Plan  Times per week: 3-5 x week prior to surgery  Specific instructions for Next Treatment: ex, mobility, gait  Current Treatment Recommendations: Strengthening, Transfer Training, Endurance Training, Gait Training, Functional Mobility Training, Safety Education & Training, Home Exercise Program, Pain Management  Safety Devices  Type of devices:  All fall risk precautions in place, Gait belt, Patient at risk for falls, Nurse notified, Call light within reach, Left in chair     Therapy Time   Individual Concurrent Group Co-treatment   Time In 0852         Time Out 0931         Minutes 39         Timed Code Treatment Minutes: Margaret [address], [address], [place] #[id number]

## 2021-05-01 NOTE — PROGRESS NOTES
Hospitalist Progress Note      PCP: Denilson Beaver MD    Date of Admission: 4/24/2021    Chief Complaint: Chest pain    Hospital Course:     Subjective: Patient is sitting in a chair denies any chest pain no shortness of breath no nausea vomiting complains of left arm edema history for recent fall and right shoulder injury was in nursing home.       Medications:  Reviewed    Infusion Medications    [START ON 5/3/2021] insulin (HUMAN R) non-weight based infusion      [START ON 5/3/2021] sodium chloride      [START ON 5/3/2021] insulin (HUMAN R) non-weight based infusion      [START ON 5/3/2021] norepinephrine      heparin (PORCINE) Infusion 1,150 Units/hr (04/29/21 0929)    dextrose 100 mL/hr (04/26/21 0459)    vashe wound therapy      sodium chloride 25 mL (04/24/21 2037)     Scheduled Medications    [START ON 5/2/2021] bisacodyl  10 mg Rectal Once    [START ON 5/3/2021] aspirin  81 mg Oral Once    [START ON 5/3/2021] midazolam  2 mg Intravenous Once    losartan  25 mg Oral Daily    latanoprost  1 drop Both Eyes Daily    insulin glargine  20 Units Subcutaneous Nightly    insulin lispro  0-12 Units Subcutaneous TID WC    insulin lispro  0-6 Units Subcutaneous Nightly    torsemide  20 mg Oral Daily    gabapentin  300 mg Oral BID    morphine  15 mg Oral 2 times per day    aspirin EC  81 mg Oral Daily    atorvastatin  40 mg Oral Nightly    DULoxetine  60 mg Oral Daily    metoprolol succinate  25 mg Oral Daily    pantoprazole  40 mg Oral Daily    Roflumilast  500 mcg Oral Nightly    sodium chloride flush  5-40 mL Intravenous 2 times per day     PRN Meds: melatonin, dextrose, hydrALAZINE, vashe wound therapy, oxyCODONE-acetaminophen, albuterol sulfate HFA, cyclobenzaprine, heparin (porcine), heparin (porcine), sodium chloride flush, sodium chloride, promethazine **OR** ondansetron, acetaminophen **OR** acetaminophen, polyethylene glycol, perflutren lipid microspheres, nitroGLYCERIN, glucose, dextrose, glucagon (rDNA)      Intake/Output Summary (Last 24 hours) at 5/1/2021 0922  Last data filed at 5/1/2021 0839  Gross per 24 hour   Intake 1533.58 ml   Output 3300 ml   Net -1766.42 ml       Physical Exam Performed:    BP (!) 109/57   Pulse 89   Temp 98.4 °F (36.9 °C) (Oral)   Resp 18   Ht 5' 10\" (1.778 m)   Wt 160 lb 4.8 oz (72.7 kg)   SpO2 93%   BMI 23.00 kg/m²     General appearance: No apparent distress, appears stated age and cooperative. HEENT: Pupils equal, round, and reactive to light. Conjunctivae/corneas clear. Neck: Supple, with full range of motion. No jugular venous distention. Trachea midline. Respiratory:  Normal respiratory effort. Clear to auscultation, bilaterally without Rales/Wheezes/Rhonchi. Cardiovascular: Regular rate and rhythm with normal S1/S2 without murmurs, rubs or gallops. Abdomen: Soft, non-tender, non-distended with normal bowel sounds. Musculoskeletal: No clubbing, cyanosis or edema bilaterally. Full range of motion without deformity. Skin: Skin color, texture, turgor normal.  No rashes or lesions. Neurologic:  Neurovascularly intact without any focal sensory/motor deficits. Cranial nerves: II-XII intact, grossly non-focal.  Psychiatric: Alert and oriented, thought content appropriate, normal insight  Capillary Refill: Brisk,3 seconds, normal   Peripheral Pulses: +2 palpable, equal bilaterally       Labs:   Recent Labs     04/30/21  0534 05/01/21  0527   WBC 6.3 5.7   HGB 8.9* 9.6*   HCT 26.8* 28.7*    238     Recent Labs     04/29/21  0456 04/30/21  0534 05/01/21  0527   * 130* 132*   K 4.5 4.5 4.8   CL 97* 95* 94*   CO2 27 25 25   BUN 19 23* 25*   CREATININE 1.0 1.0 1.0   CALCIUM 8.7 9.1 9.3   PHOS  --  4.7 4.6     No results for input(s): AST, ALT, BILIDIR, BILITOT, ALKPHOS in the last 72 hours. No results for input(s): INR in the last 72 hours. No results for input(s): Cammy Grumet in the last 72 hours.     Urinalysis: Lab Results   Component Value Date    NITRU Negative 04/26/2021    WBCUA 0-2 04/26/2021    BACTERIA 1+ 04/23/2021    RBCUA 3-4 04/26/2021    BLOODU TRACE-INTACT 04/26/2021    SPECGRAV 1.010 04/26/2021    GLUCOSEU Negative 04/26/2021    GLUCOSEU NEGATIVE 03/20/2010       Radiology:  VL DUP CAROTID BILATERAL   Final Result      VL PRE OP VEIN MAPPING   Final Result      VASCULAR REPORT    (Results Pending)           Assessment/Plan:    Active Hospital Problems    Diagnosis    Sacral wound [S31.000A]    NSTEMI (non-ST elevated myocardial infarction) ([facility] [I21.4]    Hypokalemia [E87.6]    Diabetes mellitus ([facility] [E11.9]    Anemia [D64.9]    Hyponatremia [E87.1]    GERD (gastroesophageal reflux disease) [K21.9]    Coronary artery disease [I25.10]    Essential hypertension [I10]     1. Admitted with NSTEMI status post cardiac cath on 4/26/2021 with multiple vessel disease, CT surgery consulted plan for CABG on 5/3/2021.  2.  COPD stable continue the current medications CT of the chest negative for pulmonary embolism, elevated procalcitonin on admission started on Rocephin 4/24/2021-4/29/2021. 3.  Hypertension continue with the current medication. 4.  Diabetes mellitus type 2 with hyperglycemia will increase the basal insulin, hemoglobin A1c= 7.8 on 4/24/2021  5. Chronic back pain opiate dependent continue with the current medication. 6.  GERD on PPI. 7.  Wound VAC in place wound care consulted and following.     DVT Prophylaxis: Heparin qtt  Diet: DIET CARDIAC; Carb Control: 4 carb choices (60 gms)/meal  Dietary Nutrition Supplements: Wound Healing Oral Supplement, Diabetic Oral Supplement  Diet NPO Time Specified  Diet NPO Effective Now  Code Status: Full Code    PT/OT Eval Status:     Sanjuana Hogue MD

## 2021-05-02 LAB
APTT: 58.3 SEC (ref 24.2–36.2)
GLUCOSE BLD-MCNC: 121 MG/DL (ref 70–99)
GLUCOSE BLD-MCNC: 122 MG/DL (ref 70–99)
GLUCOSE BLD-MCNC: 185 MG/DL (ref 70–99)
GLUCOSE BLD-MCNC: 345 MG/DL (ref 70–99)
IRON: 50 UG/DL (ref 37–145)
PERFORMED ON: ABNORMAL

## 2021-05-02 PROCEDURE — 2060000000 HC ICU INTERMEDIATE R&B

## 2021-05-02 PROCEDURE — 6370000000 HC RX 637 (ALT 250 FOR IP): Performed by: HOSPITALIST

## 2021-05-02 PROCEDURE — 6370000000 HC RX 637 (ALT 250 FOR IP): Performed by: INTERNAL MEDICINE

## 2021-05-02 PROCEDURE — 6370000000 HC RX 637 (ALT 250 FOR IP): Performed by: NURSE PRACTITIONER

## 2021-05-02 PROCEDURE — 2580000003 HC RX 258: Performed by: HOSPITALIST

## 2021-05-02 PROCEDURE — 36415 COLL VENOUS BLD VENIPUNCTURE: CPT

## 2021-05-02 PROCEDURE — 2580000003 HC RX 258: Performed by: INTERNAL MEDICINE

## 2021-05-02 PROCEDURE — 6360000002 HC RX W HCPCS: Performed by: HOSPITALIST

## 2021-05-02 PROCEDURE — 97605 NEG PRS WND THER DME<=50SQCM: CPT

## 2021-05-02 PROCEDURE — 85730 THROMBOPLASTIN TIME PARTIAL: CPT

## 2021-05-02 PROCEDURE — 6360000002 HC RX W HCPCS: Performed by: INTERNAL MEDICINE

## 2021-05-02 RX ORDER — CHLORHEXIDINE GLUCONATE 0.12 MG/ML
15 RINSE ORAL ONCE
Status: COMPLETED | OUTPATIENT
Start: 2021-05-02 | End: 2021-05-03

## 2021-05-02 RX ORDER — INSULIN GLARGINE 100 [IU]/ML
25 INJECTION, SOLUTION SUBCUTANEOUS NIGHTLY
Status: DISCONTINUED | OUTPATIENT
Start: 2021-05-02 | End: 2021-05-03

## 2021-05-02 RX ORDER — CHLORHEXIDINE GLUCONATE 4 G/100ML
SOLUTION TOPICAL 2 TIMES DAILY
Status: DISCONTINUED | OUTPATIENT
Start: 2021-05-02 | End: 2021-05-03

## 2021-05-02 RX ADMIN — INSULIN GLARGINE 25 UNITS: 100 INJECTION, SOLUTION SUBCUTANEOUS at 20:04

## 2021-05-02 RX ADMIN — HEPARIN SODIUM 11.5 ML/HR: 10000 INJECTION, SOLUTION INTRAVENOUS at 11:43

## 2021-05-02 RX ADMIN — ROFLUMILAST 500 MCG: 500 TABLET ORAL at 20:04

## 2021-05-02 RX ADMIN — INSULIN LISPRO 8 UNITS: 100 INJECTION, SOLUTION INTRAVENOUS; SUBCUTANEOUS at 12:34

## 2021-05-02 RX ADMIN — GABAPENTIN 300 MG: 300 CAPSULE ORAL at 08:52

## 2021-05-02 RX ADMIN — METOPROLOL SUCCINATE 25 MG: 25 TABLET, EXTENDED RELEASE ORAL at 08:51

## 2021-05-02 RX ADMIN — INSULIN LISPRO 8 UNITS: 100 INJECTION, SOLUTION INTRAVENOUS; SUBCUTANEOUS at 13:36

## 2021-05-02 RX ADMIN — ASPIRIN 81 MG: 81 TABLET, COATED ORAL at 08:51

## 2021-05-02 RX ADMIN — GABAPENTIN 300 MG: 300 CAPSULE ORAL at 20:03

## 2021-05-02 RX ADMIN — MORPHINE SULFATE 15 MG: 15 TABLET, FILM COATED, EXTENDED RELEASE ORAL at 20:04

## 2021-05-02 RX ADMIN — DULOXETINE HYDROCHLORIDE 60 MG: 60 CAPSULE, DELAYED RELEASE ORAL at 08:51

## 2021-05-02 RX ADMIN — SODIUM CHLORIDE, PRESERVATIVE FREE 10 ML: 5 INJECTION INTRAVENOUS at 08:52

## 2021-05-02 RX ADMIN — MUPIROCIN: 20 OINTMENT TOPICAL at 21:44

## 2021-05-02 RX ADMIN — TORSEMIDE 20 MG: 20 TABLET ORAL at 08:51

## 2021-05-02 RX ADMIN — LOSARTAN POTASSIUM 25 MG: 25 TABLET, FILM COATED ORAL at 08:51

## 2021-05-02 RX ADMIN — IRON SUCROSE 100 MG: 20 INJECTION, SOLUTION INTRAVENOUS at 18:45

## 2021-05-02 RX ADMIN — PANTOPRAZOLE SODIUM 40 MG: 40 TABLET, DELAYED RELEASE ORAL at 08:51

## 2021-05-02 RX ADMIN — MORPHINE SULFATE 15 MG: 15 TABLET, FILM COATED, EXTENDED RELEASE ORAL at 08:51

## 2021-05-02 RX ADMIN — INSULIN LISPRO 1 UNITS: 100 INJECTION, SOLUTION INTRAVENOUS; SUBCUTANEOUS at 21:44

## 2021-05-02 RX ADMIN — ATORVASTATIN CALCIUM 40 MG: 40 TABLET, FILM COATED ORAL at 20:04

## 2021-05-02 RX ADMIN — LATANOPROST 1 DROP: 50 SOLUTION OPHTHALMIC at 21:44

## 2021-05-02 ASSESSMENT — PAIN SCALES - GENERAL
PAINLEVEL_OUTOF10: 8
PAINLEVEL_OUTOF10: 7

## 2021-05-02 NOTE — PROGRESS NOTES
Hospitalist Progress Note      PCP: Juancarlos Hammond MD    Date of Admission: 4/24/2021    Chief Complaint: Chest pain    Hospital Course: Admitted with NSTEMI status post cardiac cath on 4/26/2021 with multiple vessel disease, CT surgery consulted plan for CABG on 5/3/2021. Subjective: Patient sitting in a chair denies any chest pain or shortness of breath no nausea vomiting. Wound VAC in place.       Medications:  Reviewed    Infusion Medications    [START ON 5/3/2021] insulin (HUMAN R) non-weight based infusion      [START ON 5/3/2021] sodium chloride      [START ON 5/3/2021] insulin (HUMAN R) non-weight based infusion      [START ON 5/3/2021] norepinephrine      heparin (PORCINE) Infusion 11.5 mL/hr (05/01/21 1122)    dextrose 100 mL/hr (04/26/21 0459)    vashe wound therapy      sodium chloride 25 mL (04/24/21 2037)     Scheduled Medications    iron sucrose (VENOFER) iv piggyback 100 mL (Admin over 60 minutes)  100 mg Intravenous Q24H    insulin glargine  22 Units Subcutaneous Nightly    bisacodyl  10 mg Rectal Once    [START ON 5/3/2021] aspirin  81 mg Oral Once    [START ON 5/3/2021] midazolam  2 mg Intravenous Once    losartan  25 mg Oral Daily    latanoprost  1 drop Both Eyes Daily    insulin lispro  0-12 Units Subcutaneous TID WC    insulin lispro  0-6 Units Subcutaneous Nightly    torsemide  20 mg Oral Daily    gabapentin  300 mg Oral BID    morphine  15 mg Oral 2 times per day    aspirin EC  81 mg Oral Daily    atorvastatin  40 mg Oral Nightly    DULoxetine  60 mg Oral Daily    metoprolol succinate  25 mg Oral Daily    pantoprazole  40 mg Oral Daily    Roflumilast  500 mcg Oral Nightly    sodium chloride flush  5-40 mL Intravenous 2 times per day     PRN Meds: melatonin, dextrose, hydrALAZINE, vashe wound therapy, oxyCODONE-acetaminophen, albuterol sulfate HFA, cyclobenzaprine, heparin (porcine), heparin (porcine), sodium chloride flush, sodium chloride, promethazine input(s): Slade Re in the last 72 hours. Urinalysis:      Lab Results   Component Value Date    NITRU Negative 04/26/2021    WBCUA 0-2 04/26/2021    BACTERIA 1+ 04/23/2021    RBCUA 3-4 04/26/2021    BLOODU TRACE-INTACT 04/26/2021    SPECGRAV 1.010 04/26/2021    GLUCOSEU Negative 04/26/2021    GLUCOSEU NEGATIVE 03/20/2010       Radiology:  VL DUP CAROTID BILATERAL   Final Result      VL PRE OP VEIN MAPPING   Final Result      VASCULAR REPORT    (Results Pending)           Assessment/Plan:    Active Hospital Problems    Diagnosis    Sacral wound [S31.000A]    NSTEMI (non-ST elevated myocardial infarction) ([facility] [I21.4]    Hypokalemia [E87.6]    Diabetes mellitus ([facility] [E11.9]    Anemia [D64.9]    Hyponatremia [E87.1]    GERD (gastroesophageal reflux disease) [K21.9]    Coronary artery disease [I25.10]    Essential hypertension [I10]     1. Admitted with NSTEMI status post cardiac cath on 4/26/2021 with multiple vessel disease, CT surgery consulted plan for CABG on 5/3/2021.  2.  COPD stable continue the current medications ,CT of the chest negative for pulmonary embolism, elevated procalcitonin on admission started on Rocephin 4/24/2021-4/29/2021. 3.  Hypertension continue with the current medication. 4.  Diabetes mellitus type 2 with hyperglycemia will increase the basal insulin, add Humalog with meals hemoglobin A1c= 7.8 on 4/24/2021  5. Chronic back pain opiate dependent continue with the current medication. 6.  GERD on PPI. 7.  Wound VAC in place wound care consulted and following.     DVT Prophylaxis: Heparin drip  Diet: DIET CARDIAC; Carb Control: 4 carb choices (60 gms)/meal  Dietary Nutrition Supplements: Wound Healing Oral Supplement, Diabetic Oral Supplement  Diet NPO Time Specified  Diet NPO Effective Now  Code Status: Full Code    PT/OT Eval Status:     Sb Brantley MD

## 2021-05-02 NOTE — PROGRESS NOTES
CVTS Thoracic Progress Note:          CC: Multivessel CAD    Subj: awake, sitting up in bed in NAD. No chest pain overnight. Obj:    Blood pressure 116/71, pulse 86, temperature 99.9 °F (37.7 °C), temperature source Oral, resp. rate 19, height 5' 10\" (1.778 m), weight 163 lb 6.4 oz (74.1 kg), SpO2 96 %. Lungs CTAB   Abdomen soft, non-tender   UOP 2200 ml in 24 hrs; Cr 1.0    Diagnostics:   Recent Labs     21  0534 21  05   WBC 6.3 5.7   HGB 8.9* 9.6*   HCT 26.8* 28.7*    238                                                                  Recent Labs     2134 21  05   * 132*   K 4.5 4.8   CL 95* 94*   CO2 25 25   BUN 23* 25*   CREATININE 1.0 1.0   GLUCOSE 186* 163*     Recent Labs     21  05   MG 2.00      Pre-op testing:   Carotid dopplers: 21   Right Impression   The right internal carotid artery reveals a <50% diameter reducing stenosis. The right vertebral artery demonstrates normal antegrade flow. The right subclavian artery is visualized and demonstrates multiphasic flow. Left Impression   The left internal carotid artery reveals a <50% diameter reducing stenosis. The left vertebral artery demonstrates normal antegrade flow. The left subclavian artery is visualized and demonstrates multiphasic flow. There is no previous exam for comparison. Vein mappin21    Summary        1. There is no evidence of superficial venous thrombosis involving the right    BELOW the knee greater saphenous vein.    2. There is evidence of chronic partially occluding superficial venous    thrombosis involving the left greater saphenous vein at the mid calf segment    and distal calf segment. Echo: 21 with Dr. Cl Howard    Summary:    Normal left ventricle systolic function with an estimated ejection fraction of 55%. No regional wall motion abnormalities are seen. Normal left ventricular diastolic filling pressure.    Mild eccentric the alternative of not doing surgery and the consequences of that action. Questions have been answered and the patient is willing to proceed. I also had a discussion regarding secondary risk modification with her, addressing the need for weight loss through dietary modification and exercise, and will continue to have those conversations with her post op. Surgery scheduled for tomorrow a.m. 5/3.     Chrystal Wolf MD, Godfrey Dance, FACCP, FAVIAN Pena MD  5/2/2021  10:19 AM

## 2021-05-02 NOTE — FLOWSHEET NOTE
Follow-up for daily Holy Communion. Pt said the  from her [place] [employer] will be coming to bring Communion to her. Pt said she will be going in for a procedure tomorrow (5/3/21) morning. She seems to be at peace with it.  assures her of prayerful support.

## 2021-05-02 NOTE — FLOWSHEET NOTE
05/01/21 2015   Assessment   Charting Type Shift assessment   Neurological   Neuro (WDL) WDL   Level of Consciousness Alert (0)   Orientation Level Oriented X4   Cognition Appropriate judgement; Appropriate safety awareness; Appropriate attention/concentration; Appropriate for developmental age; Follows commands   Language Clear; Appropriate for developmental age   Gag Present   Patrice Coma Scale   Eye Opening 4   Best Verbal Response 5   Best Motor Response 6   Patrice Coma Scale Score 15   HEENT   HEENT (WDL) X   Right Eye Intact   Left Eye Intact   Nose Intact   Throat Intact   Tongue Pink & moist   Voice Normal   Mucous Membrane Moist;Intact; [place]   Teeth Dentures upper   Respiratory   Respiratory (WDL) X   Respiratory Pattern Regular   Respiratory Depth Normal   Respiratory Quality/Effort Unlabored   Chest Assessment Chest expansion symmetrical;Trachea midline   L Breath Sounds Diminished   R Breath Sounds Diminished   Cardiac   Cardiac (WDL) WDL   Cardiac Regularity Regular   Cardiac Rhythm NSR   Heart Sounds S1, S2   Rhythm Interpretation   Pulse 70   Cardiac Monitor   Telemetry Monitor On Yes   Telemetry Audible Yes   Telemetry Alarms Set Yes   Telemetry Box Number 120   Gastrointestinal   Abdominal (WDL) WDL   Peripheral Vascular   Peripheral Vascular (WDL) X   Edema Right lower extremity; Left lower extremity   LUE Edema +2;Non-pitting   RLE Edema +1   LLE Edema Pitting;+2   RUE Neurovascular Assessment   Capillary Refill Less than/equal to 3 seconds   Color Appropriate for ethnicity   Temperature Warm   Sensation RUE Full sensation   R Radial Pulse +2   Skin Color/Condition   Skin Color/Condition (WDL) WDL   Skin Integrity   Skin Integrity (WDL) X  (see LDA)   Skin Integrity Site 2   Skin Integrity Location 2 Abrasion   Location 2 left buttock   Preventative Dressing No   Musculoskeletal   Musculoskeletal (WDL) X   RUE Full movement   LUE Full movement   RL Extremity Full movement;Weakness   LL Extremity Full movement;Weakness   Genitourinary   Genitourinary (WDL) WDL   Flank Tenderness No   Suprapubic Tenderness No   Dysuria No   Urine Assessment   Urine Color Yellow/straw   Urine Appearance Clear   Incontinence No   Urine Odor No odor   Anus/Rectum   Anus/Rectum (WDL) WDL   Psychosocial   Psychosocial (WDL) WDL

## 2021-05-03 ENCOUNTER — APPOINTMENT (OUTPATIENT)
Dept: GENERAL RADIOLOGY | Age: 71
DRG: 233 | End: 2021-05-03
Attending: INTERNAL MEDICINE
Payer: MEDICARE

## 2021-05-03 ENCOUNTER — ANESTHESIA (OUTPATIENT)
Dept: OPERATING ROOM | Age: 71
DRG: 233 | End: 2021-05-03
Payer: MEDICARE

## 2021-05-03 VITALS
RESPIRATION RATE: 2 BRPM | TEMPERATURE: 99.1 F | DIASTOLIC BLOOD PRESSURE: 53 MMHG | OXYGEN SATURATION: 99 % | SYSTOLIC BLOOD PRESSURE: 120 MMHG

## 2021-05-03 PROBLEM — Z87.891 HISTORY OF TOBACCO USE: Status: ACTIVE | Noted: 2017-10-19

## 2021-05-03 LAB
A/G RATIO: 0.9 (ref 1.1–2.2)
ACTIVATED CLOTTING TIME: 112 SEC (ref 99–130)
ACTIVATED CLOTTING TIME: 417 SEC (ref 99–130)
ACTIVATED CLOTTING TIME: 435 SEC (ref 99–130)
ACTIVATED CLOTTING TIME: 448 SEC (ref 99–130)
ACTIVATED CLOTTING TIME: 492 SEC (ref 99–130)
ACTIVATED CLOTTING TIME: 510 SEC (ref 99–130)
ACTIVATED CLOTTING TIME: 623 SEC (ref 99–130)
ACTIVATED CLOTTING TIME: 94 SEC (ref 99–130)
ALBUMIN SERPL-MCNC: 2.9 G/DL (ref 3.4–5)
ALP BLD-CCNC: 123 U/L (ref 40–129)
ALT SERPL-CCNC: 11 U/L (ref 10–40)
ANION GAP SERPL CALCULATED.3IONS-SCNC: 6 MMOL/L (ref 3–16)
ANION GAP SERPL CALCULATED.3IONS-SCNC: 9 MMOL/L (ref 3–16)
APTT: 35.9 SEC (ref 24.2–36.2)
APTT: 75.8 SEC (ref 24.2–36.2)
AST SERPL-CCNC: 18 U/L (ref 15–37)
BASE EXCESS ARTERIAL: -2 (ref -3–3)
BASE EXCESS ARTERIAL: -2 (ref -3–3)
BASE EXCESS ARTERIAL: -3 (ref -3–3)
BASE EXCESS ARTERIAL: 0 (ref -3–3)
BASE EXCESS ARTERIAL: 1 (ref -3–3)
BASE EXCESS ARTERIAL: 2 (ref -3–3)
BASE EXCESS ARTERIAL: 3 (ref -3–3)
BASE EXCESS ARTERIAL: 4 (ref -3–3)
BASOPHILS ABSOLUTE: 0 K/UL (ref 0–0.2)
BASOPHILS RELATIVE PERCENT: 0.9 %
BILIRUB SERPL-MCNC: 0.4 MG/DL (ref 0–1)
BILIRUBIN DIRECT: <0.2 MG/DL (ref 0–0.3)
BILIRUBIN, INDIRECT: ABNORMAL MG/DL (ref 0–1)
BLOOD BANK DISPENSE STATUS: NORMAL
BLOOD BANK PRODUCT CODE: NORMAL
BPU ID: NORMAL
BUN BLDV-MCNC: 21 MG/DL (ref 7–20)
BUN BLDV-MCNC: 27 MG/DL (ref 7–20)
CALCIUM IONIZED: 1.08 MMOL/L (ref 1.12–1.32)
CALCIUM IONIZED: 1.1 MMOL/L (ref 1.12–1.32)
CALCIUM IONIZED: 1.13 MMOL/L (ref 1.12–1.32)
CALCIUM IONIZED: 1.13 MMOL/L (ref 1.12–1.32)
CALCIUM IONIZED: 1.21 MMOL/L (ref 1.12–1.32)
CALCIUM IONIZED: 1.25 MMOL/L (ref 1.12–1.32)
CALCIUM IONIZED: 1.4 MMOL/L (ref 1.12–1.32)
CALCIUM SERPL-MCNC: 8.7 MG/DL (ref 8.3–10.6)
CALCIUM SERPL-MCNC: 9.3 MG/DL (ref 8.3–10.6)
CHLORIDE BLD-SCNC: 101 MMOL/L (ref 99–110)
CHLORIDE BLD-SCNC: 97 MMOL/L (ref 99–110)
CHOLESTEROL, TOTAL: 86 MG/DL (ref 0–199)
CO2: 27 MMOL/L (ref 21–32)
CO2: 27 MMOL/L (ref 21–32)
CREAT SERPL-MCNC: 1 MG/DL (ref 0.6–1.2)
CREAT SERPL-MCNC: 1.1 MG/DL (ref 0.6–1.2)
DESCRIPTION BLOOD BANK: NORMAL
EOSINOPHILS ABSOLUTE: 0.4 K/UL (ref 0–0.6)
EOSINOPHILS RELATIVE PERCENT: 8 %
ESTIMATED AVERAGE GLUCOSE: 185.8 MG/DL
GFR AFRICAN AMERICAN: 59
GFR AFRICAN AMERICAN: >60
GFR NON-AFRICAN AMERICAN: 49
GFR NON-AFRICAN AMERICAN: 55
GLOBULIN: 3.4 G/DL
GLUCOSE BLD-MCNC: 100 MG/DL (ref 70–99)
GLUCOSE BLD-MCNC: 111 MG/DL (ref 70–99)
GLUCOSE BLD-MCNC: 123 MG/DL (ref 70–99)
GLUCOSE BLD-MCNC: 130 MG/DL (ref 70–99)
GLUCOSE BLD-MCNC: 131 MG/DL (ref 70–99)
GLUCOSE BLD-MCNC: 138 MG/DL (ref 70–99)
GLUCOSE BLD-MCNC: 140 MG/DL (ref 70–99)
GLUCOSE BLD-MCNC: 145 MG/DL (ref 70–99)
GLUCOSE BLD-MCNC: 161 MG/DL (ref 70–99)
GLUCOSE BLD-MCNC: 175 MG/DL (ref 70–99)
GLUCOSE BLD-MCNC: 192 MG/DL (ref 70–99)
GLUCOSE BLD-MCNC: 57 MG/DL (ref 70–99)
GLUCOSE BLD-MCNC: 79 MG/DL (ref 70–99)
GLUCOSE BLD-MCNC: 89 MG/DL (ref 70–99)
GLUCOSE BLD-MCNC: 97 MG/DL (ref 70–99)
HBA1C MFR BLD: 8.1 %
HCO3 ARTERIAL: 22.5 MMOL/L (ref 21–29)
HCO3 ARTERIAL: 24.5 MMOL/L (ref 21–29)
HCO3 ARTERIAL: 24.8 MMOL/L (ref 21–29)
HCO3 ARTERIAL: 25.7 MMOL/L (ref 21–29)
HCO3 ARTERIAL: 26.2 MMOL/L (ref 21–29)
HCO3 ARTERIAL: 26.2 MMOL/L (ref 21–29)
HCO3 ARTERIAL: 26.4 MMOL/L (ref 21–29)
HCO3 ARTERIAL: 26.6 MMOL/L (ref 21–29)
HCO3 ARTERIAL: 26.7 MMOL/L (ref 21–29)
HCO3 ARTERIAL: 29.2 MMOL/L (ref 21–29)
HCT VFR BLD CALC: 25.4 % (ref 36–48)
HCT VFR BLD CALC: 25.5 % (ref 36–48)
HCT VFR BLD CALC: 28.8 % (ref 36–48)
HDLC SERPL-MCNC: 38 MG/DL (ref 40–60)
HEMOGLOBIN: 6.5 GM/DL (ref 12–16)
HEMOGLOBIN: 7.3 GM/DL (ref 12–16)
HEMOGLOBIN: 7.4 GM/DL (ref 12–16)
HEMOGLOBIN: 7.4 GM/DL (ref 12–16)
HEMOGLOBIN: 7.5 GM/DL (ref 12–16)
HEMOGLOBIN: 7.9 GM/DL (ref 12–16)
HEMOGLOBIN: 8 GM/DL (ref 12–16)
HEMOGLOBIN: 8.1 GM/DL (ref 12–16)
HEMOGLOBIN: 8.5 G/DL (ref 12–16)
HEMOGLOBIN: 8.5 G/DL (ref 12–16)
HEMOGLOBIN: 9 GM/DL (ref 12–16)
HEMOGLOBIN: 9.5 G/DL (ref 12–16)
INR BLD: 1.06 (ref 0.86–1.14)
INR BLD: 1.42 (ref 0.86–1.14)
LACTATE: 2.51 MMOL/L (ref 0.4–2)
LDL CHOLESTEROL CALCULATED: 35 MG/DL
LYMPHOCYTES ABSOLUTE: 1.9 K/UL (ref 1–5.1)
LYMPHOCYTES RELATIVE PERCENT: 42.3 %
MAGNESIUM: 2.6 MG/DL (ref 1.8–2.4)
MAGNESIUM: 2.9 MG/DL (ref 1.8–2.4)
MCH RBC QN AUTO: 27.9 PG (ref 26–34)
MCH RBC QN AUTO: 28.3 PG (ref 26–34)
MCHC RBC AUTO-ENTMCNC: 33 G/DL (ref 31–36)
MCHC RBC AUTO-ENTMCNC: 33.4 G/DL (ref 31–36)
MCV RBC AUTO: 83.7 FL (ref 80–100)
MCV RBC AUTO: 85.6 FL (ref 80–100)
MONOCYTES ABSOLUTE: 0.6 K/UL (ref 0–1.3)
MONOCYTES RELATIVE PERCENT: 13 %
NEUTROPHILS ABSOLUTE: 1.6 K/UL (ref 1.7–7.7)
NEUTROPHILS RELATIVE PERCENT: 35.8 %
O2 SAT, ARTERIAL: 100 % (ref 93–100)
O2 SAT, ARTERIAL: 95 % (ref 93–100)
O2 SAT, ARTERIAL: 97 % (ref 93–100)
PCO2 ARTERIAL: 33.1 MM HG (ref 35–45)
PCO2 ARTERIAL: 37.1 MM HG (ref 35–45)
PCO2 ARTERIAL: 39.1 MM HG (ref 35–45)
PCO2 ARTERIAL: 40.1 MM HG (ref 35–45)
PCO2 ARTERIAL: 40.3 MM HG (ref 35–45)
PCO2 ARTERIAL: 44.3 MM HG (ref 35–45)
PCO2 ARTERIAL: 52.4 MM HG (ref 35–45)
PCO2 ARTERIAL: 52.9 MM HG (ref 35–45)
PCO2 ARTERIAL: 54.9 MM HG (ref 35–45)
PCO2 ARTERIAL: 55 MM HG (ref 35–45)
PDW BLD-RTO: 15.5 % (ref 12.4–15.4)
PDW BLD-RTO: 15.5 % (ref 12.4–15.4)
PERFORMED ON: ABNORMAL
PERFORMED ON: NORMAL
PH ARTERIAL: 7.28 (ref 7.35–7.45)
PH ARTERIAL: 7.28 (ref 7.35–7.45)
PH ARTERIAL: 7.3 (ref 7.35–7.45)
PH ARTERIAL: 7.33 (ref 7.35–7.45)
PH ARTERIAL: 7.36 (ref 7.35–7.45)
PH ARTERIAL: 7.38 (ref 7.35–7.45)
PH ARTERIAL: 7.42 (ref 7.35–7.45)
PH ARTERIAL: 7.43 (ref 7.35–7.45)
PH ARTERIAL: 7.45 (ref 7.35–7.45)
PH ARTERIAL: 7.51 (ref 7.35–7.45)
PLATELET # BLD: 107 K/UL (ref 135–450)
PLATELET # BLD: 118 K/UL (ref 135–450)
PLATELET # BLD: 247 K/UL (ref 135–450)
PLATELET SLIDE REVIEW: ADEQUATE
PMV BLD AUTO: 7.9 FL (ref 5–10.5)
PMV BLD AUTO: 7.9 FL (ref 5–10.5)
PO2 ARTERIAL: 200.3 MM HG (ref 75–108)
PO2 ARTERIAL: 269.8 MM HG (ref 75–108)
PO2 ARTERIAL: 300.6 MM HG (ref 75–108)
PO2 ARTERIAL: 311.9 MM HG (ref 75–108)
PO2 ARTERIAL: 400.4 MM HG (ref 75–108)
PO2 ARTERIAL: 462.5 MM HG (ref 75–108)
PO2 ARTERIAL: 493.6 MM HG (ref 75–108)
PO2 ARTERIAL: 631.9 MM HG (ref 75–108)
PO2 ARTERIAL: 77.6 MM HG (ref 75–108)
PO2 ARTERIAL: 98.4 MM HG (ref 75–108)
POC HEMATOCRIT: 19 % (ref 36–48)
POC HEMATOCRIT: 22 % (ref 36–48)
POC HEMATOCRIT: 23 % (ref 36–48)
POC HEMATOCRIT: 24 % (ref 36–48)
POC HEMATOCRIT: 24 % (ref 36–48)
POC HEMATOCRIT: 27 % (ref 36–48)
POC POTASSIUM: 3.8 MMOL/L (ref 3.5–5.1)
POC POTASSIUM: 4.3 MMOL/L (ref 3.5–5.1)
POC POTASSIUM: 4.7 MMOL/L (ref 3.5–5.1)
POC POTASSIUM: 5.1 MMOL/L (ref 3.5–5.1)
POC POTASSIUM: 5.1 MMOL/L (ref 3.5–5.1)
POC POTASSIUM: 5.2 MMOL/L (ref 3.5–5.1)
POC POTASSIUM: 5.3 MMOL/L (ref 3.5–5.1)
POC SAMPLE TYPE: ABNORMAL
POC SODIUM: 131 MMOL/L (ref 136–145)
POC SODIUM: 131 MMOL/L (ref 136–145)
POC SODIUM: 132 MMOL/L (ref 136–145)
POC SODIUM: 132 MMOL/L (ref 136–145)
POC SODIUM: 134 MMOL/L (ref 136–145)
POC SODIUM: 135 MMOL/L (ref 136–145)
POC SODIUM: 137 MMOL/L (ref 136–145)
POTASSIUM SERPL-SCNC: 4.4 MMOL/L (ref 3.5–5.1)
POTASSIUM SERPL-SCNC: 4.4 MMOL/L (ref 3.5–5.1)
POTASSIUM SERPL-SCNC: 5 MMOL/L (ref 3.5–5.1)
PROTHROMBIN TIME: 12.3 SEC (ref 10–13.2)
PROTHROMBIN TIME: 16.5 SEC (ref 10–13.2)
RBC # BLD: 3.05 M/UL (ref 4–5.2)
RBC # BLD: 3.37 M/UL (ref 4–5.2)
SLIDE REVIEW: ABNORMAL
SODIUM BLD-SCNC: 133 MMOL/L (ref 136–145)
SODIUM BLD-SCNC: 134 MMOL/L (ref 136–145)
TCO2 ARTERIAL: 24 MMOL/L
TCO2 ARTERIAL: 26 MMOL/L
TCO2 ARTERIAL: 26 MMOL/L
TCO2 ARTERIAL: 27 MMOL/L
TCO2 ARTERIAL: 28 MMOL/L
TCO2 ARTERIAL: 31 MMOL/L
TOTAL PROTEIN: 6.3 G/DL (ref 6.4–8.2)
TRIGL SERPL-MCNC: 64 MG/DL (ref 0–150)
VLDLC SERPL CALC-MCNC: 13 MG/DL
WBC # BLD: 4.5 K/UL (ref 4–11)
WBC # BLD: 6.8 K/UL (ref 4–11)

## 2021-05-03 PROCEDURE — C9290 INJ, BUPIVACAINE LIPOSOME: HCPCS | Performed by: THORACIC SURGERY (CARDIOTHORACIC VASCULAR SURGERY)

## 2021-05-03 PROCEDURE — 85049 AUTOMATED PLATELET COUNT: CPT

## 2021-05-03 PROCEDURE — 83735 ASSAY OF MAGNESIUM: CPT

## 2021-05-03 PROCEDURE — 85027 COMPLETE CBC AUTOMATED: CPT

## 2021-05-03 PROCEDURE — 83605 ASSAY OF LACTIC ACID: CPT

## 2021-05-03 PROCEDURE — 6370000000 HC RX 637 (ALT 250 FOR IP): Performed by: THORACIC SURGERY (CARDIOTHORACIC VASCULAR SURGERY)

## 2021-05-03 PROCEDURE — 0W9D3ZZ DRAINAGE OF PERICARDIAL CAVITY, PERCUTANEOUS APPROACH: ICD-10-PCS | Performed by: THORACIC SURGERY (CARDIOTHORACIC VASCULAR SURGERY)

## 2021-05-03 PROCEDURE — 6360000002 HC RX W HCPCS: Performed by: ANESTHESIOLOGY

## 2021-05-03 PROCEDURE — 33533 CABG ARTERIAL SINGLE: CPT | Performed by: THORACIC SURGERY (CARDIOTHORACIC VASCULAR SURGERY)

## 2021-05-03 PROCEDURE — 82947 ASSAY GLUCOSE BLOOD QUANT: CPT

## 2021-05-03 PROCEDURE — 6360000002 HC RX W HCPCS: Performed by: THORACIC SURGERY (CARDIOTHORACIC VASCULAR SURGERY)

## 2021-05-03 PROCEDURE — 5A1221Z PERFORMANCE OF CARDIAC OUTPUT, CONTINUOUS: ICD-10-PCS | Performed by: THORACIC SURGERY (CARDIOTHORACIC VASCULAR SURGERY)

## 2021-05-03 PROCEDURE — 83036 HEMOGLOBIN GLYCOSYLATED A1C: CPT

## 2021-05-03 PROCEDURE — 2580000003 HC RX 258: Performed by: THORACIC SURGERY (CARDIOTHORACIC VASCULAR SURGERY)

## 2021-05-03 PROCEDURE — 02100Z9 BYPASS CORONARY ARTERY, ONE ARTERY FROM LEFT INTERNAL MAMMARY, OPEN APPROACH: ICD-10-PCS | Performed by: THORACIC SURGERY (CARDIOTHORACIC VASCULAR SURGERY)

## 2021-05-03 PROCEDURE — 2500000003 HC RX 250 WO HCPCS: Performed by: ANESTHESIOLOGY

## 2021-05-03 PROCEDURE — 3700000000 HC ANESTHESIA ATTENDED CARE: Performed by: THORACIC SURGERY (CARDIOTHORACIC VASCULAR SURGERY)

## 2021-05-03 PROCEDURE — 2720000010 HC SURG SUPPLY STERILE: Performed by: THORACIC SURGERY (CARDIOTHORACIC VASCULAR SURGERY)

## 2021-05-03 PROCEDURE — 85018 HEMOGLOBIN: CPT

## 2021-05-03 PROCEDURE — 6370000000 HC RX 637 (ALT 250 FOR IP): Performed by: INTERNAL MEDICINE

## 2021-05-03 PROCEDURE — 85014 HEMATOCRIT: CPT

## 2021-05-03 PROCEDURE — 82330 ASSAY OF CALCIUM: CPT

## 2021-05-03 PROCEDURE — 80061 LIPID PANEL: CPT

## 2021-05-03 PROCEDURE — 2580000003 HC RX 258: Performed by: ANESTHESIOLOGY

## 2021-05-03 PROCEDURE — P9045 ALBUMIN (HUMAN), 5%, 250 ML: HCPCS | Performed by: THORACIC SURGERY (CARDIOTHORACIC VASCULAR SURGERY)

## 2021-05-03 PROCEDURE — 2580000003 HC RX 258: Performed by: NURSE PRACTITIONER

## 2021-05-03 PROCEDURE — 3600000018 HC SURGERY OHS ADDTL 15MIN: Performed by: THORACIC SURGERY (CARDIOTHORACIC VASCULAR SURGERY)

## 2021-05-03 PROCEDURE — 84295 ASSAY OF SERUM SODIUM: CPT

## 2021-05-03 PROCEDURE — 7100000010 HC PHASE II RECOVERY - FIRST 15 MIN

## 2021-05-03 PROCEDURE — 84132 ASSAY OF SERUM POTASSIUM: CPT

## 2021-05-03 PROCEDURE — 85610 PROTHROMBIN TIME: CPT

## 2021-05-03 PROCEDURE — 021109W BYPASS CORONARY ARTERY, TWO ARTERIES FROM AORTA WITH AUTOLOGOUS VENOUS TISSUE, OPEN APPROACH: ICD-10-PCS | Performed by: THORACIC SURGERY (CARDIOTHORACIC VASCULAR SURGERY)

## 2021-05-03 PROCEDURE — 06BQ4ZZ EXCISION OF LEFT SAPHENOUS VEIN, PERCUTANEOUS ENDOSCOPIC APPROACH: ICD-10-PCS | Performed by: THORACIC SURGERY (CARDIOTHORACIC VASCULAR SURGERY)

## 2021-05-03 PROCEDURE — 71045 X-RAY EXAM CHEST 1 VIEW: CPT

## 2021-05-03 PROCEDURE — 02HV33Z INSERTION OF INFUSION DEVICE INTO SUPERIOR VENA CAVA, PERCUTANEOUS APPROACH: ICD-10-PCS | Performed by: THORACIC SURGERY (CARDIOTHORACIC VASCULAR SURGERY)

## 2021-05-03 PROCEDURE — 33518 CABG ARTERY-VEIN TWO: CPT | Performed by: THORACIC SURGERY (CARDIOTHORACIC VASCULAR SURGERY)

## 2021-05-03 PROCEDURE — 6370000000 HC RX 637 (ALT 250 FOR IP): Performed by: NURSE PRACTITIONER

## 2021-05-03 PROCEDURE — B24BZZ4 ULTRASONOGRAPHY OF HEART WITH AORTA, TRANSESOPHAGEAL: ICD-10-PCS | Performed by: THORACIC SURGERY (CARDIOTHORACIC VASCULAR SURGERY)

## 2021-05-03 PROCEDURE — 82248 BILIRUBIN DIRECT: CPT

## 2021-05-03 PROCEDURE — 3600000008 HC SURGERY OHS BASE: Performed by: THORACIC SURGERY (CARDIOTHORACIC VASCULAR SURGERY)

## 2021-05-03 PROCEDURE — 3700000001 HC ADD 15 MINUTES (ANESTHESIA): Performed by: THORACIC SURGERY (CARDIOTHORACIC VASCULAR SURGERY)

## 2021-05-03 PROCEDURE — 2500000003 HC RX 250 WO HCPCS: Performed by: THORACIC SURGERY (CARDIOTHORACIC VASCULAR SURGERY)

## 2021-05-03 PROCEDURE — C1713 ANCHOR/SCREW BN/BN,TIS/BN: HCPCS | Performed by: THORACIC SURGERY (CARDIOTHORACIC VASCULAR SURGERY)

## 2021-05-03 PROCEDURE — 85730 THROMBOPLASTIN TIME PARTIAL: CPT

## 2021-05-03 PROCEDURE — 82803 BLOOD GASES ANY COMBINATION: CPT

## 2021-05-03 PROCEDURE — 6360000002 HC RX W HCPCS: Performed by: NURSE PRACTITIONER

## 2021-05-03 PROCEDURE — 2709999900 HC NON-CHARGEABLE SUPPLY: Performed by: THORACIC SURGERY (CARDIOTHORACIC VASCULAR SURGERY)

## 2021-05-03 PROCEDURE — 2100000000 HC CCU R&B

## 2021-05-03 PROCEDURE — 33508 ENDOSCOPIC VEIN HARVEST: CPT | Performed by: THORACIC SURGERY (CARDIOTHORACIC VASCULAR SURGERY)

## 2021-05-03 PROCEDURE — 7100000011 HC PHASE II RECOVERY - ADDTL 15 MIN

## 2021-05-03 PROCEDURE — C1889 IMPLANT/INSERT DEVICE, NOC: HCPCS | Performed by: THORACIC SURGERY (CARDIOTHORACIC VASCULAR SURGERY)

## 2021-05-03 PROCEDURE — 85347 COAGULATION TIME ACTIVATED: CPT

## 2021-05-03 PROCEDURE — 6370000000 HC RX 637 (ALT 250 FOR IP): Performed by: ANESTHESIOLOGY

## 2021-05-03 PROCEDURE — C1786 PMKR, SINGLE, RATE-RESP: HCPCS | Performed by: THORACIC SURGERY (CARDIOTHORACIC VASCULAR SURGERY)

## 2021-05-03 PROCEDURE — 80053 COMPREHEN METABOLIC PANEL: CPT

## 2021-05-03 PROCEDURE — 85025 COMPLETE CBC W/AUTO DIFF WBC: CPT

## 2021-05-03 PROCEDURE — 3E0T3BZ INTRODUCTION OF ANESTHETIC AGENT INTO PERIPHERAL NERVES AND PLEXI, PERCUTANEOUS APPROACH: ICD-10-PCS | Performed by: THORACIC SURGERY (CARDIOTHORACIC VASCULAR SURGERY)

## 2021-05-03 DEVICE — IMPLANTABLE DEVICE: Type: IMPLANTABLE DEVICE | Site: STERNUM | Status: FUNCTIONAL

## 2021-05-03 DEVICE — PLATE BNE 8 H X SHP STERNALOCK BLU PRI CLSR SYS: Type: IMPLANTABLE DEVICE | Site: STERNUM | Status: FUNCTIONAL

## 2021-05-03 DEVICE — Z INACTIVE USE 2424443 DEVICE OCCL CLP L40MM SM FOOTPRINT 1 HND APPL SUTURELESS W/: Type: IMPLANTABLE DEVICE | Site: HEART | Status: FUNCTIONAL

## 2021-05-03 RX ORDER — KETOROLAC TROMETHAMINE 30 MG/ML
INJECTION, SOLUTION INTRAMUSCULAR; INTRAVENOUS PRN
Status: DISCONTINUED | OUTPATIENT
Start: 2021-05-03 | End: 2021-05-03 | Stop reason: SDUPTHER

## 2021-05-03 RX ORDER — CHLORHEXIDINE GLUCONATE 0.12 MG/ML
15 RINSE ORAL 2 TIMES DAILY
Status: DISCONTINUED | OUTPATIENT
Start: 2021-05-03 | End: 2021-05-10 | Stop reason: HOSPADM

## 2021-05-03 RX ORDER — POTASSIUM CHLORIDE 750 MG/1
10 TABLET, EXTENDED RELEASE ORAL
Status: DISCONTINUED | OUTPATIENT
Start: 2021-05-04 | End: 2021-05-10 | Stop reason: HOSPADM

## 2021-05-03 RX ORDER — SUFENTANIL CITRATE 50 UG/ML
INJECTION EPIDURAL; INTRAVENOUS PRN
Status: DISCONTINUED | OUTPATIENT
Start: 2021-05-03 | End: 2021-05-03 | Stop reason: SDUPTHER

## 2021-05-03 RX ORDER — FUROSEMIDE 10 MG/ML
40 INJECTION INTRAMUSCULAR; INTRAVENOUS 2 TIMES DAILY
Status: DISCONTINUED | OUTPATIENT
Start: 2021-05-04 | End: 2021-05-07

## 2021-05-03 RX ORDER — ACETAMINOPHEN 650 MG/1
SUPPOSITORY RECTAL PRN
Status: DISCONTINUED | OUTPATIENT
Start: 2021-05-03 | End: 2021-05-03 | Stop reason: ALTCHOICE

## 2021-05-03 RX ORDER — MEPERIDINE HYDROCHLORIDE 50 MG/ML
25 INJECTION INTRAMUSCULAR; INTRAVENOUS; SUBCUTANEOUS
Status: ACTIVE | OUTPATIENT
Start: 2021-05-03 | End: 2021-05-03

## 2021-05-03 RX ORDER — PROTAMINE SULFATE 10 MG/ML
50 INJECTION, SOLUTION INTRAVENOUS
Status: ACTIVE | OUTPATIENT
Start: 2021-05-03 | End: 2021-05-03

## 2021-05-03 RX ORDER — SODIUM CHLORIDE, SODIUM LACTATE, POTASSIUM CHLORIDE, AND CALCIUM CHLORIDE .6; .31; .03; .02 G/100ML; G/100ML; G/100ML; G/100ML
250 INJECTION, SOLUTION INTRAVENOUS CONTINUOUS PRN
Status: DISCONTINUED | OUTPATIENT
Start: 2021-05-03 | End: 2021-05-06

## 2021-05-03 RX ORDER — ALBUMIN, HUMAN INJ 5% 5 %
25 SOLUTION INTRAVENOUS PRN
Status: DISCONTINUED | OUTPATIENT
Start: 2021-05-03 | End: 2021-05-06

## 2021-05-03 RX ORDER — ONDANSETRON 2 MG/ML
4 INJECTION INTRAMUSCULAR; INTRAVENOUS EVERY 8 HOURS PRN
Status: DISCONTINUED | OUTPATIENT
Start: 2021-05-03 | End: 2021-05-10 | Stop reason: HOSPADM

## 2021-05-03 RX ORDER — ACETAMINOPHEN 325 MG/1
650 TABLET ORAL EVERY 6 HOURS
Status: DISPENSED | OUTPATIENT
Start: 2021-05-04 | End: 2021-05-06

## 2021-05-03 RX ORDER — NEOSTIGMINE METHYLSULFATE 1 MG/ML
INJECTION, SOLUTION INTRAVENOUS PRN
Status: DISCONTINUED | OUTPATIENT
Start: 2021-05-03 | End: 2021-05-03 | Stop reason: SDUPTHER

## 2021-05-03 RX ORDER — EPHEDRINE SULFATE 50 MG/ML
INJECTION INTRAVENOUS PRN
Status: DISCONTINUED | OUTPATIENT
Start: 2021-05-03 | End: 2021-05-03 | Stop reason: SDUPTHER

## 2021-05-03 RX ORDER — NITROGLYCERIN 40 MG/1
1 PATCH TRANSDERMAL DAILY
Status: ACTIVE | OUTPATIENT
Start: 2021-05-04 | End: 2021-05-07

## 2021-05-03 RX ORDER — SODIUM CHLORIDE, SODIUM LACTATE, POTASSIUM CHLORIDE, CALCIUM CHLORIDE 600; 310; 30; 20 MG/100ML; MG/100ML; MG/100ML; MG/100ML
INJECTION, SOLUTION INTRAVENOUS CONTINUOUS
Status: DISCONTINUED | OUTPATIENT
Start: 2021-05-03 | End: 2021-05-06

## 2021-05-03 RX ORDER — GLYCOPYRROLATE 0.2 MG/ML
INJECTION INTRAMUSCULAR; INTRAVENOUS PRN
Status: DISCONTINUED | OUTPATIENT
Start: 2021-05-03 | End: 2021-05-03 | Stop reason: SDUPTHER

## 2021-05-03 RX ORDER — NITROGLYCERIN 20 MG/100ML
10 INJECTION INTRAVENOUS CONTINUOUS PRN
Status: DISCONTINUED | OUTPATIENT
Start: 2021-05-03 | End: 2021-05-06

## 2021-05-03 RX ORDER — SODIUM CHLORIDE 0.9 % (FLUSH) 0.9 %
10 SYRINGE (ML) INJECTION PRN
Status: DISCONTINUED | OUTPATIENT
Start: 2021-05-03 | End: 2021-05-10 | Stop reason: HOSPADM

## 2021-05-03 RX ORDER — FUROSEMIDE 10 MG/ML
40 INJECTION INTRAMUSCULAR; INTRAVENOUS
Status: ACTIVE | OUTPATIENT
Start: 2021-05-03 | End: 2021-05-03

## 2021-05-03 RX ORDER — SODIUM CHLORIDE 0.9 % (FLUSH) 0.9 %
10 SYRINGE (ML) INJECTION EVERY 12 HOURS SCHEDULED
Status: DISCONTINUED | OUTPATIENT
Start: 2021-05-03 | End: 2021-05-10 | Stop reason: HOSPADM

## 2021-05-03 RX ORDER — DEXTROSE MONOHYDRATE 25 G/50ML
12.5 INJECTION, SOLUTION INTRAVENOUS PRN
Status: DISCONTINUED | OUTPATIENT
Start: 2021-05-03 | End: 2021-05-10 | Stop reason: HOSPADM

## 2021-05-03 RX ORDER — MIDAZOLAM HYDROCHLORIDE 1 MG/ML
1 INJECTION INTRAMUSCULAR; INTRAVENOUS
Status: DISCONTINUED | OUTPATIENT
Start: 2021-05-03 | End: 2021-05-06

## 2021-05-03 RX ORDER — ALBUTEROL SULFATE 90 UG/1
2 AEROSOL, METERED RESPIRATORY (INHALATION) EVERY 6 HOURS PRN
Status: DISCONTINUED | OUTPATIENT
Start: 2021-05-03 | End: 2021-05-10 | Stop reason: HOSPADM

## 2021-05-03 RX ORDER — KETAMINE HYDROCHLORIDE 100 MG/ML
INJECTION, SOLUTION INTRAMUSCULAR; INTRAVENOUS PRN
Status: DISCONTINUED | OUTPATIENT
Start: 2021-05-03 | End: 2021-05-03 | Stop reason: SDUPTHER

## 2021-05-03 RX ORDER — MORPHINE SULFATE 2 MG/ML
2 INJECTION, SOLUTION INTRAMUSCULAR; INTRAVENOUS
Status: DISCONTINUED | OUTPATIENT
Start: 2021-05-03 | End: 2021-05-10 | Stop reason: HOSPADM

## 2021-05-03 RX ORDER — MORPHINE SULFATE 4 MG/ML
4 INJECTION, SOLUTION INTRAMUSCULAR; INTRAVENOUS
Status: DISCONTINUED | OUTPATIENT
Start: 2021-05-03 | End: 2021-05-10 | Stop reason: HOSPADM

## 2021-05-03 RX ORDER — ETOMIDATE 2 MG/ML
INJECTION INTRAVENOUS PRN
Status: DISCONTINUED | OUTPATIENT
Start: 2021-05-03 | End: 2021-05-03 | Stop reason: SDUPTHER

## 2021-05-03 RX ORDER — SODIUM CHLORIDE 9 MG/ML
25 INJECTION, SOLUTION INTRAVENOUS PRN
Status: DISCONTINUED | OUTPATIENT
Start: 2021-05-03 | End: 2021-05-10 | Stop reason: HOSPADM

## 2021-05-03 RX ORDER — MAGNESIUM SULFATE IN WATER 40 MG/ML
2000 INJECTION, SOLUTION INTRAVENOUS PRN
Status: DISCONTINUED | OUTPATIENT
Start: 2021-05-03 | End: 2021-05-10 | Stop reason: HOSPADM

## 2021-05-03 RX ORDER — SODIUM CHLORIDE, SODIUM LACTATE, POTASSIUM CHLORIDE, CALCIUM CHLORIDE 600; 310; 30; 20 MG/100ML; MG/100ML; MG/100ML; MG/100ML
INJECTION, SOLUTION INTRAVENOUS CONTINUOUS PRN
Status: DISCONTINUED | OUTPATIENT
Start: 2021-05-03 | End: 2021-05-03 | Stop reason: SDUPTHER

## 2021-05-03 RX ORDER — DEXTROSE MONOHYDRATE 50 MG/ML
100 INJECTION, SOLUTION INTRAVENOUS PRN
Status: DISCONTINUED | OUTPATIENT
Start: 2021-05-03 | End: 2021-05-10 | Stop reason: HOSPADM

## 2021-05-03 RX ORDER — NICOTINE POLACRILEX 4 MG
15 LOZENGE BUCCAL PRN
Status: DISCONTINUED | OUTPATIENT
Start: 2021-05-03 | End: 2021-05-10 | Stop reason: HOSPADM

## 2021-05-03 RX ORDER — ASPIRIN 300 MG/1
300 SUPPOSITORY RECTAL ONCE
Status: COMPLETED | OUTPATIENT
Start: 2021-05-03 | End: 2021-05-03

## 2021-05-03 RX ORDER — SODIUM CHLORIDE 9 MG/ML
INJECTION, SOLUTION INTRAVENOUS CONTINUOUS PRN
Status: DISCONTINUED | OUTPATIENT
Start: 2021-05-03 | End: 2021-05-03 | Stop reason: SDUPTHER

## 2021-05-03 RX ORDER — OXYCODONE HYDROCHLORIDE 5 MG/1
10 TABLET ORAL EVERY 4 HOURS PRN
Status: DISCONTINUED | OUTPATIENT
Start: 2021-05-03 | End: 2021-05-10 | Stop reason: HOSPADM

## 2021-05-03 RX ORDER — OXYCODONE HYDROCHLORIDE 5 MG/1
5 TABLET ORAL EVERY 4 HOURS PRN
Status: DISCONTINUED | OUTPATIENT
Start: 2021-05-03 | End: 2021-05-10 | Stop reason: HOSPADM

## 2021-05-03 RX ORDER — MORPHINE SULFATE 15 MG/1
15 TABLET, FILM COATED, EXTENDED RELEASE ORAL EVERY 12 HOURS SCHEDULED
Status: DISCONTINUED | OUTPATIENT
Start: 2021-05-04 | End: 2021-05-10 | Stop reason: HOSPADM

## 2021-05-03 RX ORDER — FONDAPARINUX SODIUM 2.5 MG/.5ML
2.5 INJECTION SUBCUTANEOUS DAILY
Status: DISCONTINUED | OUTPATIENT
Start: 2021-05-04 | End: 2021-05-10 | Stop reason: HOSPADM

## 2021-05-03 RX ORDER — GABAPENTIN 300 MG/1
300 CAPSULE ORAL 2 TIMES DAILY
Status: DISCONTINUED | OUTPATIENT
Start: 2021-05-04 | End: 2021-05-10 | Stop reason: HOSPADM

## 2021-05-03 RX ORDER — ASPIRIN 81 MG/1
81 TABLET ORAL DAILY
Status: DISCONTINUED | OUTPATIENT
Start: 2021-05-04 | End: 2021-05-10 | Stop reason: HOSPADM

## 2021-05-03 RX ORDER — ONDANSETRON 2 MG/ML
INJECTION INTRAMUSCULAR; INTRAVENOUS PRN
Status: DISCONTINUED | OUTPATIENT
Start: 2021-05-03 | End: 2021-05-03 | Stop reason: SDUPTHER

## 2021-05-03 RX ORDER — DOBUTAMINE HYDROCHLORIDE 200 MG/100ML
2 INJECTION INTRAVENOUS CONTINUOUS PRN
Status: DISCONTINUED | OUTPATIENT
Start: 2021-05-03 | End: 2021-05-06

## 2021-05-03 RX ORDER — BISACODYL 10 MG
10 SUPPOSITORY, RECTAL RECTAL DAILY PRN
Status: DISCONTINUED | OUTPATIENT
Start: 2021-05-03 | End: 2021-05-10 | Stop reason: HOSPADM

## 2021-05-03 RX ORDER — HEPARIN SODIUM 1000 [USP'U]/ML
INJECTION, SOLUTION INTRAVENOUS; SUBCUTANEOUS PRN
Status: DISCONTINUED | OUTPATIENT
Start: 2021-05-03 | End: 2021-05-03 | Stop reason: SDUPTHER

## 2021-05-03 RX ORDER — AMINOCAPROIC ACID 250 MG/ML
INJECTION, SOLUTION INTRAVENOUS PRN
Status: DISCONTINUED | OUTPATIENT
Start: 2021-05-03 | End: 2021-05-03 | Stop reason: SDUPTHER

## 2021-05-03 RX ORDER — INSULIN GLARGINE 100 [IU]/ML
0.15 INJECTION, SOLUTION SUBCUTANEOUS NIGHTLY
Status: DISCONTINUED | OUTPATIENT
Start: 2021-05-04 | End: 2021-05-07

## 2021-05-03 RX ORDER — DIPHENHYDRAMINE HCL 25 MG
25 TABLET ORAL NIGHTLY PRN
Status: DISCONTINUED | OUTPATIENT
Start: 2021-05-04 | End: 2021-05-10 | Stop reason: HOSPADM

## 2021-05-03 RX ORDER — PROTAMINE SULFATE 10 MG/ML
INJECTION, SOLUTION INTRAVENOUS PRN
Status: DISCONTINUED | OUTPATIENT
Start: 2021-05-03 | End: 2021-05-03 | Stop reason: SDUPTHER

## 2021-05-03 RX ORDER — LOSARTAN POTASSIUM 25 MG/1
25 TABLET ORAL DAILY
Status: DISCONTINUED | OUTPATIENT
Start: 2021-05-04 | End: 2021-05-09

## 2021-05-03 RX ORDER — ATORVASTATIN CALCIUM 40 MG/1
40 TABLET, FILM COATED ORAL NIGHTLY
Status: DISCONTINUED | OUTPATIENT
Start: 2021-05-04 | End: 2021-05-10 | Stop reason: HOSPADM

## 2021-05-03 RX ORDER — CISATRACURIUM BESYLATE 2 MG/ML
INJECTION, SOLUTION INTRAVENOUS PRN
Status: DISCONTINUED | OUTPATIENT
Start: 2021-05-03 | End: 2021-05-03 | Stop reason: SDUPTHER

## 2021-05-03 RX ORDER — POTASSIUM CHLORIDE 29.8 MG/ML
20 INJECTION INTRAVENOUS PRN
Status: DISCONTINUED | OUTPATIENT
Start: 2021-05-03 | End: 2021-05-10 | Stop reason: HOSPADM

## 2021-05-03 RX ORDER — DEXAMETHASONE SODIUM PHOSPHATE 4 MG/ML
INJECTION, SOLUTION INTRA-ARTICULAR; INTRALESIONAL; INTRAMUSCULAR; INTRAVENOUS; SOFT TISSUE PRN
Status: DISCONTINUED | OUTPATIENT
Start: 2021-05-03 | End: 2021-05-03 | Stop reason: SDUPTHER

## 2021-05-03 RX ORDER — MILRINONE LACTATE 0.2 MG/ML
0.38 INJECTION, SOLUTION INTRAVENOUS CONTINUOUS
Status: DISCONTINUED | OUTPATIENT
Start: 2021-05-03 | End: 2021-05-06

## 2021-05-03 RX ORDER — CALCIUM CHLORIDE 100 MG/ML
INJECTION INTRAVENOUS; INTRAVENTRICULAR PRN
Status: DISCONTINUED | OUTPATIENT
Start: 2021-05-03 | End: 2021-05-03 | Stop reason: SDUPTHER

## 2021-05-03 RX ORDER — POLYETHYLENE GLYCOL 3350 17 G/17G
17 POWDER, FOR SOLUTION ORAL DAILY
Status: DISCONTINUED | OUTPATIENT
Start: 2021-05-04 | End: 2021-05-10 | Stop reason: HOSPADM

## 2021-05-03 RX ADMIN — SUFENTANIL CITRATE 10 MCG: 50 INJECTION, SOLUTION EPIDURAL; INTRAVENOUS at 08:00

## 2021-05-03 RX ADMIN — IRON SUCROSE 100 MG: 20 INJECTION, SOLUTION INTRAVENOUS at 15:31

## 2021-05-03 RX ADMIN — CEFAZOLIN SODIUM 1 G: 10 INJECTION, POWDER, FOR SOLUTION INTRAVENOUS at 11:16

## 2021-05-03 RX ADMIN — ONDANSETRON 4 MG: 2 INJECTION INTRAMUSCULAR; INTRAVENOUS at 20:15

## 2021-05-03 RX ADMIN — CISATRACURIUM BESYLATE 20 MG: 2 INJECTION INTRAVENOUS at 07:04

## 2021-05-03 RX ADMIN — KETOROLAC TROMETHAMINE 30 MG: 30 INJECTION, SOLUTION INTRAMUSCULAR; INTRAVENOUS at 12:35

## 2021-05-03 RX ADMIN — SODIUM CHLORIDE 3.45 UNITS/HR: 9 INJECTION, SOLUTION INTRAVENOUS at 16:37

## 2021-05-03 RX ADMIN — PROTAMINE SULFATE 400 MG: 10 INJECTION, SOLUTION INTRAVENOUS at 11:42

## 2021-05-03 RX ADMIN — ONDANSETRON 4 MG: 2 INJECTION INTRAMUSCULAR; INTRAVENOUS at 12:35

## 2021-05-03 RX ADMIN — VANCOMYCIN HYDROCHLORIDE 1 G: 1 INJECTION, POWDER, LYOPHILIZED, FOR SOLUTION INTRAVENOUS at 07:30

## 2021-05-03 RX ADMIN — CISATRACURIUM BESYLATE 10 MG: 2 INJECTION INTRAVENOUS at 10:00

## 2021-05-03 RX ADMIN — SODIUM CHLORIDE, SODIUM LACTATE, POTASSIUM CHLORIDE, AND CALCIUM CHLORIDE: .6; .31; .03; .02 INJECTION, SOLUTION INTRAVENOUS at 12:34

## 2021-05-03 RX ADMIN — MUPIROCIN: 20 OINTMENT TOPICAL at 20:25

## 2021-05-03 RX ADMIN — MIDAZOLAM 1 MG: 1 INJECTION INTRAMUSCULAR; INTRAVENOUS at 23:58

## 2021-05-03 RX ADMIN — ASPIRIN 81 MG: 81 TABLET, COATED ORAL at 05:18

## 2021-05-03 RX ADMIN — SODIUM CHLORIDE 1.8 UNITS/HR: 9 INJECTION, SOLUTION INTRAVENOUS at 09:35

## 2021-05-03 RX ADMIN — LATANOPROST 1 DROP: 50 SOLUTION OPHTHALMIC at 21:08

## 2021-05-03 RX ADMIN — VANCOMYCIN HYDROCHLORIDE 1000 MG: 1 INJECTION, POWDER, LYOPHILIZED, FOR SOLUTION INTRAVENOUS at 20:18

## 2021-05-03 RX ADMIN — CALCIUM CHLORIDE 0.5 G: 100 INJECTION, SOLUTION INTRAVENOUS at 11:39

## 2021-05-03 RX ADMIN — MORPHINE SULFATE 4 MG: 4 INJECTION, SOLUTION INTRAMUSCULAR; INTRAVENOUS at 23:15

## 2021-05-03 RX ADMIN — Medication 10 ML: at 20:19

## 2021-05-03 RX ADMIN — MORPHINE SULFATE 4 MG: 4 INJECTION, SOLUTION INTRAMUSCULAR; INTRAVENOUS at 18:55

## 2021-05-03 RX ADMIN — DEXAMETHASONE SODIUM PHOSPHATE 10 MG: 4 INJECTION, SOLUTION INTRAMUSCULAR; INTRAVENOUS at 12:44

## 2021-05-03 RX ADMIN — GLYCOPYRROLATE 0.2 MG: 0.2 INJECTION, SOLUTION INTRAMUSCULAR; INTRAVENOUS at 12:36

## 2021-05-03 RX ADMIN — CEFAZOLIN SODIUM 2 G: 10 INJECTION, POWDER, FOR SOLUTION INTRAVENOUS at 07:30

## 2021-05-03 RX ADMIN — CISATRACURIUM BESYLATE 10 MG: 2 INJECTION INTRAVENOUS at 09:00

## 2021-05-03 RX ADMIN — ALBUMIN (HUMAN) 25 G: 12.5 INJECTION, SOLUTION INTRAVENOUS at 13:21

## 2021-05-03 RX ADMIN — CISATRACURIUM BESYLATE 10 MG: 2 INJECTION INTRAVENOUS at 08:00

## 2021-05-03 RX ADMIN — CISATRACURIUM BESYLATE 10 MG: 2 INJECTION INTRAVENOUS at 11:00

## 2021-05-03 RX ADMIN — OXYCODONE AND ACETAMINOPHEN 1 TABLET: 10; 325 TABLET ORAL at 05:18

## 2021-05-03 RX ADMIN — Medication 15 ML: at 20:25

## 2021-05-03 RX ADMIN — MORPHINE SULFATE 4 MG: 4 INJECTION, SOLUTION INTRAMUSCULAR; INTRAVENOUS at 13:40

## 2021-05-03 RX ADMIN — Medication 15 ML: at 06:24

## 2021-05-03 RX ADMIN — SUFENTANIL CITRATE 10 MCG: 50 INJECTION, SOLUTION EPIDURAL; INTRAVENOUS at 08:24

## 2021-05-03 RX ADMIN — SODIUM CHLORIDE, SODIUM LACTATE, POTASSIUM CHLORIDE, AND CALCIUM CHLORIDE: .6; .31; .03; .02 INJECTION, SOLUTION INTRAVENOUS at 06:59

## 2021-05-03 RX ADMIN — KETAMINE HYDROCHLORIDE 25 MG: 100 INJECTION, SOLUTION INTRAMUSCULAR; INTRAVENOUS at 12:50

## 2021-05-03 RX ADMIN — AMINOCAPROIC ACID 5000 MG: 250 INJECTION, SOLUTION INTRAVENOUS at 09:00

## 2021-05-03 RX ADMIN — ETOMIDATE 10 MG: 2 INJECTION INTRAVENOUS at 08:25

## 2021-05-03 RX ADMIN — MORPHINE SULFATE 4 MG: 4 INJECTION, SOLUTION INTRAMUSCULAR; INTRAVENOUS at 21:07

## 2021-05-03 RX ADMIN — CYCLOBENZAPRINE 10 MG: 10 TABLET, FILM COATED ORAL at 00:57

## 2021-05-03 RX ADMIN — MUPIROCIN: 20 OINTMENT TOPICAL at 05:19

## 2021-05-03 RX ADMIN — SUFENTANIL CITRATE 10 MCG: 50 INJECTION, SOLUTION EPIDURAL; INTRAVENOUS at 07:22

## 2021-05-03 RX ADMIN — FAMOTIDINE 20 MG: 10 INJECTION, SOLUTION INTRAVENOUS at 20:19

## 2021-05-03 RX ADMIN — SODIUM CHLORIDE: 9 INJECTION, SOLUTION INTRAVENOUS at 05:19

## 2021-05-03 RX ADMIN — SODIUM CHLORIDE: 9 INJECTION, SOLUTION INTRAVENOUS at 07:30

## 2021-05-03 RX ADMIN — ASPIRIN 300 MG: 300 SUPPOSITORY RECTAL at 18:04

## 2021-05-03 RX ADMIN — Medication 2.5 MG: at 12:36

## 2021-05-03 RX ADMIN — ETOMIDATE 5 MG: 2 INJECTION INTRAVENOUS at 07:03

## 2021-05-03 RX ADMIN — HEPARIN SODIUM 417 UNITS: 1000 INJECTION, SOLUTION INTRAVENOUS; SUBCUTANEOUS at 08:56

## 2021-05-03 RX ADMIN — SUFENTANIL CITRATE 10 MCG: 50 INJECTION, SOLUTION EPIDURAL; INTRAVENOUS at 07:03

## 2021-05-03 RX ADMIN — PROTAMINE SULFATE 50 MG: 10 INJECTION, SOLUTION INTRAVENOUS at 11:56

## 2021-05-03 RX ADMIN — EPHEDRINE SULFATE 10 MG: 50 INJECTION INTRAVENOUS at 07:08

## 2021-05-03 RX ADMIN — SODIUM CHLORIDE, SODIUM LACTATE, POTASSIUM CHLORIDE, AND CALCIUM CHLORIDE: .6; .31; .03; .02 INJECTION, SOLUTION INTRAVENOUS at 11:47

## 2021-05-03 RX ADMIN — CEFAZOLIN 2000 MG: 10 INJECTION, POWDER, FOR SOLUTION INTRAVENOUS at 18:57

## 2021-05-03 RX ADMIN — MIDAZOLAM 2 MG: 1 INJECTION INTRAMUSCULAR; INTRAVENOUS at 06:13

## 2021-05-03 RX ADMIN — METOPROLOL TARTRATE 12.5 MG: 25 TABLET, FILM COATED ORAL at 05:18

## 2021-05-03 RX ADMIN — SUFENTANIL CITRATE 10 MCG: 50 INJECTION, SOLUTION EPIDURAL; INTRAVENOUS at 08:30

## 2021-05-03 ASSESSMENT — PULMONARY FUNCTION TESTS
PIF_VALUE: 15
PIF_VALUE: 1
PIF_VALUE: 24
PIF_VALUE: 1
PIF_VALUE: 21
PIF_VALUE: 19
PIF_VALUE: 1
PIF_VALUE: 18
PIF_VALUE: 18
PIF_VALUE: 20
PIF_VALUE: 20
PIF_VALUE: 1
PIF_VALUE: 15
PIF_VALUE: 20
PIF_VALUE: 16
PIF_VALUE: 21
PIF_VALUE: 1
PIF_VALUE: 8
PIF_VALUE: 21
PIF_VALUE: 19
PIF_VALUE: 1
PIF_VALUE: 17
PIF_VALUE: 1
PIF_VALUE: 1
PIF_VALUE: 20
PIF_VALUE: 17
PIF_VALUE: 0
PIF_VALUE: 1
PIF_VALUE: 10
PIF_VALUE: 13
PIF_VALUE: 22
PIF_VALUE: 19
PIF_VALUE: 1
PIF_VALUE: 21
PIF_VALUE: 1
PIF_VALUE: 1
PIF_VALUE: 12
PIF_VALUE: 11
PIF_VALUE: 17
PIF_VALUE: 21
PIF_VALUE: 18
PIF_VALUE: 18
PIF_VALUE: 20
PIF_VALUE: 18
PIF_VALUE: 19
PIF_VALUE: 1
PIF_VALUE: 21
PIF_VALUE: 1
PIF_VALUE: 18
PIF_VALUE: 1
PIF_VALUE: 20
PIF_VALUE: 1
PIF_VALUE: 21
PIF_VALUE: 1
PIF_VALUE: 20
PIF_VALUE: 17
PIF_VALUE: 1
PIF_VALUE: 1
PIF_VALUE: 15
PIF_VALUE: 1
PIF_VALUE: 21
PIF_VALUE: 1
PIF_VALUE: 1
PIF_VALUE: 13
PIF_VALUE: 18
PIF_VALUE: 20
PIF_VALUE: 1
PIF_VALUE: 1
PIF_VALUE: 20
PIF_VALUE: 1
PIF_VALUE: 20
PIF_VALUE: 19
PIF_VALUE: 21
PIF_VALUE: 24
PIF_VALUE: 1
PIF_VALUE: 17
PIF_VALUE: 11
PIF_VALUE: 20
PIF_VALUE: 18
PIF_VALUE: 20
PIF_VALUE: 18
PIF_VALUE: 17
PIF_VALUE: 29
PIF_VALUE: 18
PIF_VALUE: 1
PIF_VALUE: 2
PIF_VALUE: 18
PIF_VALUE: 1
PIF_VALUE: 16
PIF_VALUE: 18
PIF_VALUE: 1
PIF_VALUE: 20
PIF_VALUE: 1
PIF_VALUE: 1
PIF_VALUE: 20
PIF_VALUE: 21
PIF_VALUE: 1
PIF_VALUE: 23
PIF_VALUE: 21
PIF_VALUE: 13
PIF_VALUE: 1
PIF_VALUE: 9
PIF_VALUE: 1
PIF_VALUE: 20
PIF_VALUE: 1
PIF_VALUE: 20
PIF_VALUE: 1
PIF_VALUE: 21
PIF_VALUE: 21
PIF_VALUE: 20
PIF_VALUE: 15
PIF_VALUE: 1
PIF_VALUE: 11
PIF_VALUE: 20
PIF_VALUE: 15
PIF_VALUE: 1
PIF_VALUE: 1
PIF_VALUE: 20
PIF_VALUE: 1
PIF_VALUE: 20
PIF_VALUE: 34
PIF_VALUE: 20
PIF_VALUE: 1
PIF_VALUE: 1
PIF_VALUE: 17
PIF_VALUE: 1
PIF_VALUE: 1
PIF_VALUE: 19
PIF_VALUE: 19
PIF_VALUE: 1
PIF_VALUE: 1
PIF_VALUE: 20
PIF_VALUE: 20
PIF_VALUE: 1
PIF_VALUE: 1
PIF_VALUE: 15
PIF_VALUE: 1
PIF_VALUE: 1
PIF_VALUE: 0
PIF_VALUE: 1
PIF_VALUE: 20
PIF_VALUE: 18
PIF_VALUE: 1
PIF_VALUE: 20
PIF_VALUE: 21
PIF_VALUE: 20
PIF_VALUE: 1
PIF_VALUE: 11
PIF_VALUE: 16
PIF_VALUE: 2
PIF_VALUE: 20
PIF_VALUE: 21
PIF_VALUE: 16
PIF_VALUE: 20
PIF_VALUE: 1
PIF_VALUE: 18
PIF_VALUE: 1
PIF_VALUE: 1
PIF_VALUE: 9
PIF_VALUE: 2
PIF_VALUE: 13
PIF_VALUE: 2
PIF_VALUE: 12
PIF_VALUE: 1
PIF_VALUE: 17
PIF_VALUE: 13
PIF_VALUE: 2
PIF_VALUE: 20
PIF_VALUE: 12
PIF_VALUE: 1
PIF_VALUE: 21
PIF_VALUE: 20
PIF_VALUE: 18

## 2021-05-03 ASSESSMENT — PAIN SCALES - WONG BAKER
WONGBAKER_NUMERICALRESPONSE: 8
WONGBAKER_NUMERICALRESPONSE: 8

## 2021-05-03 ASSESSMENT — PAIN SCALES - GENERAL
PAINLEVEL_OUTOF10: 9
PAINLEVEL_OUTOF10: 9
PAINLEVEL_OUTOF10: 8
PAINLEVEL_OUTOF10: 9
PAINLEVEL_OUTOF10: 7

## 2021-05-03 NOTE — BRIEF OP NOTE
Brief Postoperative Note      Patient: Naina Clarity  YOB: 1950  MRN: [id number]  Date of Procedure: 5/3/2021    Pre-Op Diagnosis:  CORONARY ARTERY DISEASE. Silent myocardial ischemia. Syncope. NSTEMI. DANIELLE, T2DM. Dyslipidemia. COPD. Hx tobacco use. Peripheral arterial disease. Chronic preop iron deficiency anemia. Post-Op Diagnosis: same        Procedure:  Urgent CABG X 3, with pedicled LIMA to LAD, single Greater Saphenous VG to PV br of RCA, separate single Greater SVG to OM2,  LAAppendage obliteration with 40 mm Atricure LA Clip, CPB, EVH Left Greater Saphenous vein, ABHISHEK, Epiaortic ultrasound, Doppler verification of grafts, Bilateral 5 level intercostal nerve block(Exparel), Platelet gel application. Sternal plating. Surgeon(s):  Beverley Gillespie MD    Assistants: Nubia, , Srinivasa Daniels    Anesthesia: MARICARMEN/ MD Saman    Estimated Blood Loss (mL): 100   ml    Replacements:  2 U PRBCs    Pump time:  125 min    Cross-clamp time:  114 min    Findings: distal ascending aorta atherosclerotic,  No MR or AI. Diffuse Cor artery calcification. OM1 and PDA(distal vessel to small for grafting) calcified cords and not graftable. Complications: None    Specimens:   * No specimens in log *    Implants:  Implant Name Type Inv. Item Serial No.  Lot No. LRB No. Used Action   DEVICE OCCL CLP L40MM SM FOOTPRINT 1 HND APPL SUTURELESS W/  DEVICE OCCL CLP L40MM SM FOOTPRINT 1 HND APPL SUTURELESS W/  [employer]WD [id number] N/A 1 Implanted   PLATE BNE 8 H X SHP STERNALOCK JANNY CARMENZA CLSR SYS  PLATE BNE 8 H X SHP STERNALOCK JANNY CARMENZA CLSR SYS  [employer]ET MICROFIXATION-WD  N/A 3 Implanted   SCREW BNE L14MM DIA2. 4MM G CANC TI SELF DRL JOSIANE FULL THRD  SCREW BNE L14MM DIA2. 4MM G CANC TI SELF DRL JOSIANE FULL THRD  [employer]OMET MICROFIXATION-WD  N/A 13 Implanted   SCREW BNE L16MM DIA2. 4MM G CANC TI SELF DRL JOSIANE FULL THRD  SCREW BNE L16MM DIA2. 4MM G CANC TI SELF DRL JOSIANE FULL THRD  Adonay Larsena BIOMET MICROFIXATION-WD  N/A 8 Implanted   SCREW BNE L18MM DIA2. 4MM G CANC SELF DRL JOSIANE FULL THRD  SCREW BNE L18MM DIA2. 4MM G CANC SELF DRL JOSIANE FULL THRD  [employer]OMET MICROFIXATION-WD  N/A 2 Implanted   SCREW BNE L16MM DIA2.7MM MAG CANC EMGCY JOSIANE FULL THRD  SCREW BNE L16MM DIA2.7MM MAG CANC EMGCY JOSIANE FULL THRD  [employer]ET MICROFIXATION-WD  N/A 1 Implanted         Drains:   Chest Tube 1 Mediastinal 32 Australian (Active)       Chest Tube 2 Left Pleural 32 Australian (Active)       Closed/Suction Drain Left Leg Bulb (Active)       Negative Pressure Wound Therapy Sacrum (Active)   $ Standard NPWT <=50 sq cm PER TX $ Yes 05/02/21 0945   Wound Type Pressure ulcer: Stage IV 05/02/21 0945   Unit Type [facility] [id number] 05/02/21 0945   Dressing Type Veraflo 05/02/21 0945   Number of pieces used 1 05/02/21 0945   Cycle Continuous 05/02/21 0945   Target Pressure (mmHg) 125 05/02/21 0945   Intensity 5 05/02/21 0945   Irrigation Solution Vashe 04/30/21 1530   Instillation Volume  26 mL 05/02/21 0945   Soak Time  6 minutes 05/02/21 0945   Vac Frequency 4 hours 05/02/21 0945   Canister changed? Yes 05/02/21 0945   Dressing Status Changed 04/30/21 2055   Dressing Changed Changed/New 04/30/21 2055   Drainage Amount Small 05/02/21 0945   Drainage Description Clear 05/02/21 0945   Dressing Change Due 05/04/21 05/02/21 0945   Output (ml) 500 ml 05/02/21 0945   Wound Assessment Red 05/02/21 0945   Blanca-wound Assessment Maceration 05/02/21 0945   Shape oval 04/26/21 1606   Odor None 05/02/21 0945       Urethral Catheter Latex; Temperature probe 16 fr (Active)       Stella Schmitz MD   Date: 5/3/2021  Time: 12:32 PM   [id number]

## 2021-05-03 NOTE — FLOWSHEET NOTE
05/02/21 1945   Assessment   Charting Type Shift assessment   Neurological   Neuro (WDL) WDL   Level of Consciousness Alert (0)   Orientation Level Oriented X4   Cognition Appropriate judgement; Appropriate safety awareness; Appropriate attention/concentration; Appropriate for developmental age; Follows commands   Language Clear; Appropriate for developmental age   [de-identified] Coma Scale   Eye Opening 4   Best Verbal Response 5   Best Motor Response 6   Patrice Coma Scale Score 15   HEENT   HEENT (WDL) X   Right Eye Intact   Left Eye Intact   Nose Intact   Throat Intact   Tongue Pink & moist   Voice Normal   Mucous Membrane Moist;Intact; Howe   Teeth Dentures upper   Respiratory   Respiratory (WDL) X   Respiratory Pattern Regular   Respiratory Depth Normal   Respiratory Quality/Effort Unlabored   Chest Assessment Chest expansion symmetrical;Trachea midline   L Breath Sounds Diminished;Clear   R Breath Sounds Diminished;Clear   Cardiac   Cardiac (WDL) X  (NSTEMI)   Cardiac Regularity Regular   Cardiac Rhythm NSR   Heart Sounds S1, S2   Rhythm Interpretation   Pulse 64   Cardiac Monitor   Telemetry Monitor On Yes   Telemetry Audible Yes   Telemetry Alarms Set Yes   Telemetry Box Number 120   Gastrointestinal   Abdominal (WDL) WDL   Peripheral Vascular   Peripheral Vascular (WDL) X   Edema Right lower extremity; Left lower extremity   LUE Edema +2;Non-pitting   RLE Edema +1   LLE Edema Pitting;+2   RUE Neurovascular Assessment   Capillary Refill Less than/equal to 3 seconds   Color Appropriate for ethnicity   Temperature Warm   Sensation RUE Full sensation   Skin Color/Condition   Skin Color/Condition (WDL) WDL   Skin Integrity   Skin Integrity (WDL) X  (see LDA)   Skin Integrity Site 2   Skin Integrity Location 2 Abrasion   Location 2 left buttock   Musculoskeletal   Musculoskeletal (WDL) X   RUE Full movement   LUE Full movement   RL Extremity Full movement;Weakness   LL Extremity Full movement;Weakness   Genitourinary Genitourinary (WDL) WDL   Flank Tenderness No   Suprapubic Tenderness No   Dysuria No   Urine Assessment   Urine Color Yellow/straw   Urine Appearance Clear   Incontinence No   Urine Odor No odor   Anus/Rectum   Anus/Rectum (WDL) WDL   Psychosocial   Psychosocial (WDL) WDL

## 2021-05-03 NOTE — ANESTHESIA POSTPROCEDURE EVALUATION
Department of Anesthesiology  Postprocedure Note    Patient: Shanice Sher  MRN: [id number]  YOB: 1950  Date of evaluation: 5/3/2021  Time:  1:11 PM     Procedure Summary     Date: 05/03/21 Room / Location: [address] [place] [address]    Anesthesia Start: 1374 Anesthesia Stop: 1140    Procedure: CORONARY ARTERY BYPASS X3 WITH LEFT ATRIAL APPENDAGE CLIP, 5 LEVEL BILATERAL INTERCOSTAL NERVE BLOCK, STERNAL PLATING (N/A Chest) Diagnosis:       Coronary artery disease with angina pectoris, unspecified vessel or lesion type, unspecified whether native or transplanted heart ([facility] [address]      (CORONARY ARTERY DISEASE)    Surgeons: Adriel Dempsey MD Responsible Provider: Denise Strickland MD    Anesthesia Type: general ASA Status: 4          Anesthesia Type: general    Pratik Phase I:      Pratik Phase II:      Last vitals: Reviewed and per EMR flowsheets. Anesthesia Post Evaluation    Patient location during evaluation: ICU  Patient participation: waiting for patient participation  Level of consciousness: awake and lethargic  Pain scale: see nsg notes.   Airway patency: patent  Nausea & Vomiting: no vomiting and no nausea  Complications: no  Cardiovascular status: hemodynamically stable  Respiratory status: face mask  Hydration status: stable

## 2021-05-03 NOTE — PROGRESS NOTES
Patient admitted to CVU from [place] [address] and attached to monitors. Report received from anesthesiologist.  Chest x-ray ordered. Labs drawn and sent. Assessment complete. Continue monitoring hemodynamics. Family let back to see patient. Visiting hours reviewed and all questions answered.  Primary  Kay Cuevas

## 2021-05-03 NOTE — PROGRESS NOTES
Round one of surgery prep started. Room has been cleaned and bleached. Pt has been bathed and is resting comfortably.

## 2021-05-03 NOTE — PROGRESS NOTES
Occupational Therapy/Physical Therapy  Pt is scheduled in [place] [id number] today. Please provide OT/PT orders post-op. Thank you.   Starlette Pro OT  Josphine Meigs PT

## 2021-05-03 NOTE — PLAN OF CARE
Problem: Falls - Risk of:  Goal: Will remain free from falls  Description: Will remain free from falls  Outcome: Ongoing     Problem: Falls - Risk of:  Goal: Absence of physical injury  Description: Absence of physical injury  Outcome: Ongoing     Problem: Skin Integrity:  Goal: Will show no infection signs and symptoms  Description: Will show no infection signs and symptoms  Outcome: Ongoing     Problem: Skin Integrity:  Goal: Absence of new skin breakdown  Description: Absence of new skin breakdown  Outcome: Ongoing     Problem: Pain:  Goal: Pain level will decrease  Description: Pain level will decrease  Outcome: Ongoing     Problem: Pain:  Goal: Control of acute pain  Description: Control of acute pain  Outcome: Ongoing     Problem: Pain:  Goal: Control of chronic pain  Description: Control of chronic pain  Outcome: Ongoing     Problem: Nutrition  Goal: Optimal nutrition therapy  Outcome: Ongoing     Problem: Preoperative Routine:  Goal: Will comply with preparation for surgery or procedure  Description: Will comply with preparation for surgery or procedure  Outcome: Ongoing

## 2021-05-03 NOTE — PROGRESS NOTES
Portable chest xray completed post op [facility]. Dr. Aguilar Rm reviewed.  All lines and tubes in good position

## 2021-05-03 NOTE — CARE COORDINATION
PD#9/ CABG 5/3. Aware that [facility] and Elo Kendall provided wound vac (coccyx wound)   Will need post op evaluation from PT/OT to determine discharge plan. Patient lives in private home - granddaughter lives with her. Will follow.     Britany Escobar RN

## 2021-05-04 ENCOUNTER — ANESTHESIA EVENT (OUTPATIENT)
Dept: OPERATING ROOM | Age: 71
DRG: 233 | End: 2021-05-04
Payer: MEDICARE

## 2021-05-04 ENCOUNTER — APPOINTMENT (OUTPATIENT)
Dept: GENERAL RADIOLOGY | Age: 71
DRG: 233 | End: 2021-05-04
Attending: INTERNAL MEDICINE
Payer: MEDICARE

## 2021-05-04 LAB
ANION GAP SERPL CALCULATED.3IONS-SCNC: 10 MMOL/L (ref 3–16)
BUN BLDV-MCNC: 20 MG/DL (ref 7–20)
CALCIUM SERPL-MCNC: 8.6 MG/DL (ref 8.3–10.6)
CHLORIDE BLD-SCNC: 106 MMOL/L (ref 99–110)
CO2: 21 MMOL/L (ref 21–32)
CREAT SERPL-MCNC: 0.9 MG/DL (ref 0.6–1.2)
GFR AFRICAN AMERICAN: >60
GFR NON-AFRICAN AMERICAN: >60
GLUCOSE BLD-MCNC: 104 MG/DL (ref 70–99)
GLUCOSE BLD-MCNC: 104 MG/DL (ref 70–99)
GLUCOSE BLD-MCNC: 105 MG/DL (ref 70–99)
GLUCOSE BLD-MCNC: 109 MG/DL (ref 70–99)
GLUCOSE BLD-MCNC: 119 MG/DL (ref 70–99)
GLUCOSE BLD-MCNC: 121 MG/DL (ref 70–99)
GLUCOSE BLD-MCNC: 133 MG/DL (ref 70–99)
GLUCOSE BLD-MCNC: 134 MG/DL (ref 70–99)
GLUCOSE BLD-MCNC: 138 MG/DL (ref 70–99)
GLUCOSE BLD-MCNC: 139 MG/DL (ref 70–99)
GLUCOSE BLD-MCNC: 154 MG/DL (ref 70–99)
GLUCOSE BLD-MCNC: 198 MG/DL (ref 70–99)
HCT VFR BLD CALC: 19.4 % (ref 36–48)
HCT VFR BLD CALC: 26.4 % (ref 36–48)
HEMOGLOBIN: 6.5 G/DL (ref 12–16)
HEMOGLOBIN: 8.8 G/DL (ref 12–16)
INR BLD: 1.04 (ref 0.86–1.14)
MCH RBC QN AUTO: 28.6 PG (ref 26–34)
MCHC RBC AUTO-ENTMCNC: 33.2 G/DL (ref 31–36)
MCV RBC AUTO: 86.2 FL (ref 80–100)
PDW BLD-RTO: 15.6 % (ref 12.4–15.4)
PERFORMED ON: ABNORMAL
PLATELET # BLD: 137 K/UL (ref 135–450)
PMV BLD AUTO: 9.1 FL (ref 5–10.5)
POTASSIUM SERPL-SCNC: 4.8 MMOL/L (ref 3.5–5.1)
POTASSIUM SERPL-SCNC: 4.9 MMOL/L (ref 3.5–5.1)
PROTHROMBIN TIME: 12.1 SEC (ref 10–13.2)
RBC # BLD: 2.26 M/UL (ref 4–5.2)
REASON FOR REJECTION: NORMAL
REJECTED TEST: NORMAL
SODIUM BLD-SCNC: 137 MMOL/L (ref 136–145)
WBC # BLD: 10.2 K/UL (ref 4–11)

## 2021-05-04 PROCEDURE — 99024 POSTOP FOLLOW-UP VISIT: CPT | Performed by: NURSE PRACTITIONER

## 2021-05-04 PROCEDURE — 71045 X-RAY EXAM CHEST 1 VIEW: CPT

## 2021-05-04 PROCEDURE — 80048 BASIC METABOLIC PNL TOTAL CA: CPT

## 2021-05-04 PROCEDURE — 6360000002 HC RX W HCPCS: Performed by: THORACIC SURGERY (CARDIOTHORACIC VASCULAR SURGERY)

## 2021-05-04 PROCEDURE — 2500000003 HC RX 250 WO HCPCS: Performed by: THORACIC SURGERY (CARDIOTHORACIC VASCULAR SURGERY)

## 2021-05-04 PROCEDURE — 97605 NEG PRS WND THER DME<=50SQCM: CPT

## 2021-05-04 PROCEDURE — 6370000000 HC RX 637 (ALT 250 FOR IP): Performed by: THORACIC SURGERY (CARDIOTHORACIC VASCULAR SURGERY)

## 2021-05-04 PROCEDURE — P9016 RBC LEUKOCYTES REDUCED: HCPCS

## 2021-05-04 PROCEDURE — 85027 COMPLETE CBC AUTOMATED: CPT

## 2021-05-04 PROCEDURE — 84132 ASSAY OF SERUM POTASSIUM: CPT

## 2021-05-04 PROCEDURE — 85018 HEMOGLOBIN: CPT

## 2021-05-04 PROCEDURE — 2100000000 HC CCU R&B

## 2021-05-04 PROCEDURE — 2580000003 HC RX 258: Performed by: THORACIC SURGERY (CARDIOTHORACIC VASCULAR SURGERY)

## 2021-05-04 PROCEDURE — 85014 HEMATOCRIT: CPT

## 2021-05-04 PROCEDURE — 85610 PROTHROMBIN TIME: CPT

## 2021-05-04 PROCEDURE — 99232 SBSQ HOSP IP/OBS MODERATE 35: CPT | Performed by: NURSE PRACTITIONER

## 2021-05-04 PROCEDURE — 36430 TRANSFUSION BLD/BLD COMPNT: CPT

## 2021-05-04 RX ORDER — SODIUM CHLORIDE 9 MG/ML
INJECTION, SOLUTION INTRAVENOUS PRN
Status: DISCONTINUED | OUTPATIENT
Start: 2021-05-04 | End: 2021-05-06

## 2021-05-04 RX ORDER — FAMOTIDINE 20 MG/1
20 TABLET, FILM COATED ORAL 2 TIMES DAILY
Status: DISCONTINUED | OUTPATIENT
Start: 2021-05-04 | End: 2021-05-04

## 2021-05-04 RX ADMIN — MORPHINE SULFATE 2 MG: 2 INJECTION, SOLUTION INTRAMUSCULAR; INTRAVENOUS at 04:42

## 2021-05-04 RX ADMIN — Medication 10 ML: at 08:28

## 2021-05-04 RX ADMIN — POTASSIUM CHLORIDE 10 MEQ: 10 TABLET, EXTENDED RELEASE ORAL at 16:28

## 2021-05-04 RX ADMIN — ACETAMINOPHEN 650 MG: 325 TABLET ORAL at 12:18

## 2021-05-04 RX ADMIN — MIDAZOLAM 1 MG: 1 INJECTION INTRAMUSCULAR; INTRAVENOUS at 02:01

## 2021-05-04 RX ADMIN — FAMOTIDINE 20 MG: 10 INJECTION, SOLUTION INTRAVENOUS at 21:12

## 2021-05-04 RX ADMIN — FAMOTIDINE 20 MG: 10 INJECTION, SOLUTION INTRAVENOUS at 08:24

## 2021-05-04 RX ADMIN — CEFAZOLIN 2000 MG: 10 INJECTION, POWDER, FOR SOLUTION INTRAVENOUS at 18:15

## 2021-05-04 RX ADMIN — CEFAZOLIN 2000 MG: 10 INJECTION, POWDER, FOR SOLUTION INTRAVENOUS at 10:57

## 2021-05-04 RX ADMIN — GABAPENTIN 300 MG: 300 CAPSULE ORAL at 21:09

## 2021-05-04 RX ADMIN — MORPHINE SULFATE 15 MG: 15 TABLET, FILM COATED, EXTENDED RELEASE ORAL at 21:09

## 2021-05-04 RX ADMIN — METOPROLOL TARTRATE 12.5 MG: 25 TABLET, FILM COATED ORAL at 11:42

## 2021-05-04 RX ADMIN — FUROSEMIDE 40 MG: 10 INJECTION, SOLUTION INTRAMUSCULAR; INTRAVENOUS at 16:28

## 2021-05-04 RX ADMIN — MIDAZOLAM 1 MG: 1 INJECTION INTRAMUSCULAR; INTRAVENOUS at 07:22

## 2021-05-04 RX ADMIN — LOSARTAN POTASSIUM 25 MG: 25 TABLET, FILM COATED ORAL at 11:44

## 2021-05-04 RX ADMIN — CEFAZOLIN 2000 MG: 10 INJECTION, POWDER, FOR SOLUTION INTRAVENOUS at 03:11

## 2021-05-04 RX ADMIN — DULOXETINE HYDROCHLORIDE 60 MG: 60 CAPSULE, DELAYED RELEASE ORAL at 11:45

## 2021-05-04 RX ADMIN — VANCOMYCIN HYDROCHLORIDE 1000 MG: 1 INJECTION, POWDER, LYOPHILIZED, FOR SOLUTION INTRAVENOUS at 08:24

## 2021-05-04 RX ADMIN — MORPHINE SULFATE 4 MG: 4 INJECTION, SOLUTION INTRAMUSCULAR; INTRAVENOUS at 16:28

## 2021-05-04 RX ADMIN — ACETAMINOPHEN 650 MG: 325 TABLET ORAL at 20:22

## 2021-05-04 RX ADMIN — MORPHINE SULFATE 4 MG: 4 INJECTION, SOLUTION INTRAMUSCULAR; INTRAVENOUS at 03:11

## 2021-05-04 RX ADMIN — Medication 10 ML: at 21:11

## 2021-05-04 RX ADMIN — MORPHINE SULFATE 4 MG: 4 INJECTION, SOLUTION INTRAMUSCULAR; INTRAVENOUS at 10:18

## 2021-05-04 RX ADMIN — Medication 15 ML: at 08:22

## 2021-05-04 RX ADMIN — MIDAZOLAM 1 MG: 1 INJECTION INTRAMUSCULAR; INTRAVENOUS at 07:20

## 2021-05-04 RX ADMIN — MIDAZOLAM 1 MG: 1 INJECTION INTRAMUSCULAR; INTRAVENOUS at 06:20

## 2021-05-04 RX ADMIN — MUPIROCIN: 20 OINTMENT TOPICAL at 08:22

## 2021-05-04 RX ADMIN — CYCLOBENZAPRINE 10 MG: 10 TABLET, FILM COATED ORAL at 12:18

## 2021-05-04 RX ADMIN — ONDANSETRON 4 MG: 2 INJECTION INTRAMUSCULAR; INTRAVENOUS at 08:15

## 2021-05-04 RX ADMIN — MORPHINE SULFATE 15 MG: 15 TABLET, FILM COATED, EXTENDED RELEASE ORAL at 08:09

## 2021-05-04 RX ADMIN — Medication 15 ML: at 21:11

## 2021-05-04 RX ADMIN — FUROSEMIDE 40 MG: 10 INJECTION, SOLUTION INTRAMUSCULAR; INTRAVENOUS at 08:24

## 2021-05-04 RX ADMIN — MUPIROCIN: 20 OINTMENT TOPICAL at 21:11

## 2021-05-04 RX ADMIN — MORPHINE SULFATE 4 MG: 4 INJECTION, SOLUTION INTRAMUSCULAR; INTRAVENOUS at 12:18

## 2021-05-04 RX ADMIN — LATANOPROST 1 DROP: 50 SOLUTION OPHTHALMIC at 21:11

## 2021-05-04 RX ADMIN — VANCOMYCIN HYDROCHLORIDE 1000 MG: 1 INJECTION, POWDER, LYOPHILIZED, FOR SOLUTION INTRAVENOUS at 20:28

## 2021-05-04 RX ADMIN — MORPHINE SULFATE 4 MG: 4 INJECTION, SOLUTION INTRAMUSCULAR; INTRAVENOUS at 01:18

## 2021-05-04 RX ADMIN — FONDAPARINUX SODIUM 2.5 MG: 2.5 INJECTION, SOLUTION SUBCUTANEOUS at 08:24

## 2021-05-04 RX ADMIN — GABAPENTIN 300 MG: 300 CAPSULE ORAL at 08:09

## 2021-05-04 RX ADMIN — ROFLUMILAST 500 MCG: 500 TABLET ORAL at 21:16

## 2021-05-04 RX ADMIN — MAGNESIUM OXIDE TAB 400 MG (241.3 MG ELEMENTAL MG) 400 MG: 400 (241.3 MG) TAB at 11:44

## 2021-05-04 RX ADMIN — MORPHINE SULFATE 4 MG: 4 INJECTION, SOLUTION INTRAMUSCULAR; INTRAVENOUS at 08:21

## 2021-05-04 RX ADMIN — ATORVASTATIN CALCIUM 40 MG: 40 TABLET, FILM COATED ORAL at 21:09

## 2021-05-04 RX ADMIN — ASPIRIN 81 MG: 81 TABLET, COATED ORAL at 11:45

## 2021-05-04 ASSESSMENT — PAIN SCALES - GENERAL
PAINLEVEL_OUTOF10: 10
PAINLEVEL_OUTOF10: 9
PAINLEVEL_OUTOF10: 10

## 2021-05-04 NOTE — PROGRESS NOTES
Physical Therapy    Orders received. Per chart review, pt with hematoma on CXR this AM and plans to return to OR for hemothorax evacuation tomorrow. Per RN, pt is medical hold for today. Will re-attempt tomorrow if medically stable.     Valorie Montalvo, PT  Dulce Maria Ashley, OTR/L

## 2021-05-04 NOTE — PROGRESS NOTES
CXR obtained for line placement after pt pulled on line and pulled off dressing. CXR noted to be abnormal and Dr. Angelica Mayfield seen in [place] and asked to look at CXR. Hematoma noted in the Left upper lung. MDs to evaluate and decide further action.

## 2021-05-04 NOTE — OP NOTE
[address], [place]                                OPERATIVE REPORT    PATIENT NAME: Danette Grullon                    :        1950  MED REC NO:   [id number]                          ROOM:       9239  ACCOUNT NO:   [de-identified]                           ADMIT DATE: 2021  PROVIDER:     Stevo Stephenson. Myra Cortes MD    DATE OF PROCEDURE:  2021    PREOPERATIVE DIAGNOSES:  1. Coronary artery disease with silent myocardial ischemia. 2.  Non-ST-segment elevation MI.  3.  Syncope. 4.  Obstructive sleep apnea. 5.  Type 2 diabetes. 6.  Dyslipidemia. 7.  COPD. 8.  History of tobacco use disorder. 9.  Peripheral arterial disease status post right lower extremity  bypass. 10.  Chronic preop iron-deficiency anemia. POSTOPERATIVE DIAGNOSES:  1. Coronary artery disease with silent myocardial ischemia. 2.  Non-ST-segment elevation MI.  3.  Syncope. 4.  Obstructive sleep apnea. 5.  Type 2 diabetes. 6.  Dyslipidemia. 7.  COPD. 8.  History of tobacco use disorder. 9.  Peripheral arterial disease status post right lower extremity  bypass. 10.  Chronic preop iron-deficiency anemia. OPERATION PERFORMED:  1. Urgent coronary bypass grafting surgery x3 with single greater  saphenous vein graft to the posterior ventricular branch of the right  coronary artery, separate single greater saphenous vein graft to the  second obtuse marginal branch of circumflex,  pedicled left internal artery to the LAD. 2.  Left atrial appendage obliteration with 40 mm AtriCure left atrial  clip. 3.  Cardiopulmonary bypass. 4.  Endoscopic vein harvest of the left greater saphenous vein. 5.  Transesophageal echo. 6.  Epiaortic ultrasound. 7.  Doppler verification of grafts. 8.  Bilateral five-level intercostal nerve block with Exparel. 9.  Platelet gel application. 10.  Sternal plating. SURGEON:  Stevo Stephenson.  Myra Cortes MD    ASSISTANT:  Willis Vega SA; Srinivasa Rodas    ANESTHESIA:  General endotracheal anesthesia per Dr. Cristin Hull. INDICATIONS AND CONSENT:  The patient is a 58-year-old female with  significant past medical history of COPD, bronchiectasis,  tracheomalacia, osteoporosis, obstructive sleep apnea, but does not use a CPAP, rheumatoid arthritis, chronic back pain. She has been  treated for sacral ulcers at the [facility]. She also has  type 2 diabetes. She presented to the emergency room with a syncopal  episode. During her workup and evaluation there, she was found to have  elevated troponin levels. EKG showed lateral changes all consistent  with non-ST-segment elevation MI. She was transferred to [facility], where she underwent further evaluation including left heart  catheterization, which demonstrated severe diffuse three-vessel coronary  artery disease not amenable to percutaneous interventional therapy. I  saw the patient in consultation. I examined the patient, reviewed her  imaging studies, recommended coronary bypass grafting surgery for her. I discussed this operation with her. I discussed risks, benefits and  alternatives of surgery including risk of infection, bleeding, stroke,  MI, arrhythmias, operative mortality risk in the 2% to 4% range based on  STS risk profile. Questions were answered and consent was obtained to  proceed with surgery. INTRAOPERATIVE FINDINGS:  Left ventricular function was well preserved. She got bypass with insulin. No inotropes. Preprocedural ABHISHEK showed a  trivial mitral regurg, no aortic insufficiency, no interatrial septal  defect, no clot in left atrial appendage. Global systolic ejection  fraction about 30%. Post-revascularization ABHISHEK showed no residual  mitral regurg, no aortic insufficiency, no other structural changes and  improved global systolic function with EF of about 60%.   She did have  preop chronic iron deficiency anemia. She required 2 units of packed  red blood cells in the pump during bypass. Epiaortic ultrasound, which  I independently performed and interpreted based on surgical plans on did  show calcific atherosclerotic disease, high in the ascending aorta and  the proximal arch. Aorta could be marked and this area avoided with  cross-clamp. Rest of the proximal aorta was clear of atherosclerotic  disease. Pump time was 129 minutes. Cross-clamp time 114 minutes. Coldest core  temperature was 34.4 degrees centigrade. Ancef and vancomycin both  administered prior to incision. OPERATIVE PROCEDURE:  The patient was brought to the operating room,  placed on the operating table in supine position. A right brachial  arterial monitoring line was placed, general endotracheal anesthesia  accomplished, Bowden catheter placed. A right internal jugular  triple-lumen central venous line placed by Anesthesia. The chest,  abdomen, groin, and legs were all prepped with DuraPrep and draped in  the usual sterile fashion for open heart surgery. Time-out process carried out appropriately identifying the patient and  procedure. The heart was exposed through median sternotomy incision  while the portion of the left greater saphenous vein was harvested using  endoscopic vein harvest technique. Right greater saphenous vein  previously used for right fem-pop bypass. Simultaneously, the left  internal artery was taken down from its position underneath the sternum  with electrocautery. Systemic heparin was administered. Distal end of  the ANDRÉS clipped with surgical clips, divided, stump oversewn with  Ethibond suture, and the pedicle placed in the left upper chest until  needed for bypass. Pericardium was opened longitudinally, tacked up laterally creating a  pericardial cradle.   The ascending aorta was inspected with epiaortic  ultrasound, which I independently performed and interpreted based on  surgical plans of  circumflex. Separate vein and coronary prepared and two vessels joined  end-to-side with 7-0 Prolene suture in a running fashion. The  anastomosis was checked for hemostasis and found to be satisfactory. First OM was also a calcified cord all the way up to the apex. There  was no vessel distally, not graftable. The vein was brought to the left  side of the aorta. The ANDRÉS was brought into the field through a pericardial window behind  the left lateral thymic fat pad. The LAD was dissected and opened  longitudinally. It was mildly calcified at about mid vessel. ANDRÉS was  long enough, it was prepared. Two vessels joined end-to-side with 7-0  Prolene suture in a running fashion after opening the LAD  longitudinally. Bulldog removed from the pedicle, checked the  anastomosis for hemostasis and found to be satisfactory. Bulldog  reapplied. Side branches were clipped and the pedicle was secured to  the myocardium with 5-0 Prolene suture to prevent twisting and torquing  at the anastomotic site. During this timeframe, the patient was systemically rewarmed. Antegrade  cardioplegia administered to distend the aorta for vein graft  measurements. The final vein was trimmed for length. Aortotomy was  created with 4.4 mm aortic punch. Proximal anastomosis was completed  with 6-0 Prolene suture in a running fashion. The patient placed in  Trendelenburg position and a hotshot dose of cardioplegia administered,  500 mL over three minutes, half retrograde, rest antegrade and  cross-clamp removed. Normal sinus rhythm returned spontaneously. ST  segments were normalized. No cardioversion required. Each anastomoses  were checked for hemostasis and all found to be satisfactory. Retrograde cardioplegia line was removed and the pursestring suture  securing it was tied and reinforced with 3-0 Prolene suture in a  figure-of-eight fashion.     Temporary epicardial pacing wires were placed, two on the surface of  right ventricle, two at the anterior groove, brought out through the  skin and secured with Ethibond suture. No pacing required. With the  patient fully rewarmed, she was ventilated and then  from  bypass without inotropic support. The venous cannula was removed and  pursestring suture securing it was tied and reinforced with 3-0 Prolene  suture in a figure-of-eight fashion. The needle vent removed from the  ascending aorta and the pursestring suture securing it was tied. The  heparin effect was reversed with protamine, returning ACT towards  baseline. As I did that, the aortic cannula was removed, and the  pursestring suture securing it was tied x2 and reinforced with pledgeted  3-0 Prolene suture placed in a horizontal mattress fashion. Doppler was used to check each of the grafts, all found to have  satisfactory systolic and diastolic signals. With satisfactory hemostasis, the pericardium was reapproximated over  the ascending aorta as well as over the base of the diaphragm. Two  32-German chest tubes were placed, one in the left pleural cavity, the  other in the anterior pericardium overlying the right ventricle and  aorta, both secured with Ethibond suture and connected to suction  drainage device. Platelet-poor gel was placed inside the pericardium as  well as underneath the left chest wall in the ANDRÉS bed, platelet rich gel  between the sternum as it was closed. The sternum was reapproximated  with two stainless steel wires and three eight-hole plates, one in the  manubrium and two in the sternal body. Platelet-rich gel placed between  the sternum as it was closed. The intercostal space was infiltrated  bilaterally at five levels each with a total of 90 mL of standard  Exparel solution. Sternal wound was irrigated with copious amounts of  warm saline and closed in appropriate layers with Vicryl suture  including subcuticular stitch in the skin.     ESTIMATED BLOOD LOSS:  100 mL.    REPLACEMENTS:  Two units of packed red blood cells. COUNTS:  Sponge and needle counts were correct x2. AVELINA Lau MD    D: 05/03/2021 12:50:21       T: 05/03/2021 20:01:04     [email or web address]  Job#: [id number]     Doc#: [id number]    CC:  Sasha Patterson.  MD Fernie Navas MD

## 2021-05-04 NOTE — PROGRESS NOTES
[address]   Daily Progress Note    Admit Date:  4/24/2021  HPI:  No chief complaint on file. Interval history: Saba Maurer is being followed for CAD. S/p CABG x3 on 5/3/21    She has a history of COPD, bronchiectasis, tracheomalacia, osteoporosis with multiple fractures, GERD, DANIELLE not treated with CPAP, RA, chronic back pain, stage IV sacral ulcer following tibial fracture in October 2019 , type II DM     Subjective:  Ms. Rene Shows is awake. Family at the bedside.  Having some mild pain    Objective:   /81   Pulse 85   Temp 97.5 °F (36.4 °C) (Bladder)   Resp 17   Ht 5' 10\" (1.778 m)   Wt 160 lb (72.6 kg)   SpO2 97%   BMI 22.96 kg/m²       Intake/Output Summary (Last 24 hours) at 5/4/2021 1442  Last data filed at 5/4/2021 1215  Gross per 24 hour   Intake 1580 ml   Output 3155 ml   Net -1575 ml       NYHA: IV    Physical Exam:  General:  Awake, alert, NAD  Skin:  Warm and dry  Neck:  Lines in place  Chest:  Clear to auscultation, no wheezes/rhonchi/rales  Telemetry: NSR  Cardiovascular:  RRR S1S2, no m/r/g midsternal incision intact  Abdomen:  Soft, nontender, +bowel sounds  Extremities:  +RLE edema, wrapped with NELSON drain, no LLE  edema    Medications:    famotidine (PEPCID) injection  20 mg Intravenous BID    [START ON 5/5/2021] ceFAZolin  2,000 mg Intravenous On Call to OR    sodium chloride flush  10 mL Intravenous 2 times per day    fondaparinux  2.5 mg Subcutaneous Daily    aspirin  81 mg Oral Daily    acetaminophen  650 mg Oral Q6H    chlorhexidine  15 mL Mouth/Throat BID    furosemide  40 mg Intravenous BID    magnesium oxide  400 mg Oral Daily    mupirocin   Nasal BID    nitroGLYCERIN  1 patch Transdermal Daily    potassium chloride  10 mEq Oral TID WC    polyethylene glycol  17 g Oral Daily    bisacodyl  5 mg Oral Daily    metoprolol tartrate  12.5 mg Oral BID    losartan  25 mg Oral Daily    ceFAZolin (ANCEF) IVPB  2,000 mg Intravenous Q8H    vancomycin (VANCOCIN) IV  1,000 mg Intravenous Q12H    insulin glargine  0.15 Units/kg Subcutaneous Nightly    [START ON 5/5/2021] insulin lispro  0-6 Units Subcutaneous TID WC    [START ON 5/5/2021] insulin lispro  0-3 Units Subcutaneous Nightly    morphine  15 mg Oral 2 times per day    Roflumilast  500 mcg Oral Nightly    atorvastatin  40 mg Oral Nightly    gabapentin  300 mg Oral BID    latanoprost  1 drop Both Eyes Daily    DULoxetine  60 mg Oral Daily      sodium chloride      sodium chloride      lactated ringers      sodium chloride      lactated ringers bolus      DOBUTamine      phenylephrine (JOSE-SYNEPHRINE) 50mg/250mL infusion      milrinone Stopped (05/03/21 1940)    norepinephrine Stopped (05/04/21 0200)    EPINEPHrine infusion      nitroGLYCERIN      insulin 2.19 Units/hr (05/04/21 0500)    dextrose      vashe wound therapy         Lab Data:  CBC:   Recent Labs     05/03/21 0328 05/03/21 0328 05/03/21  1305 05/03/21  1305 05/03/21  1400 05/03/21  1400 05/03/21  2034 05/04/21  0600 05/04/21  1350   WBC 4.5  --   --   --  6.8  --   --  10.2  --    HGB 8.5*   < >  --    < > 9.5*   < > 7.9* 6.5* 8.8*     --  118*  --  107*  --   --  137  --     < > = values in this interval not displayed. BMP:    Recent Labs     05/03/21 0328 05/03/21  1334 05/03/21  2030 05/04/21  0014 05/04/21  0450   * 134*  --   --  137   K 4.4 4.4 5.0 4.8 4.9   CO2 27 27  --   --  21   BUN 27* 21*  --   --  20   CREATININE 1.1 1.0  --   --  0.9     INR:    Recent Labs     05/03/21 0328 05/03/21  1305 05/04/21  0450   INR 1.06 1.42* 1.04     BNP:  No results for input(s): PROBNP in the last 72 hours.   No results found for: LVEF, LVEFMODE    Testing:   ECHO: 4/24/21 with Dr. Sade Kennedy left ventricle systolic function with an estimated ejection fraction of 55%.   No regional wall motion abnormalities are seen.   Normal left ventricular diastolic filling pressure.   Mild eccentric aortic regurgitation.   Mild mitral and tricuspid regurgitation.   Systolic pulmonary artery pressure (SPAP) is normal and estimated at 27 mmHg (right atrial pressure 3 mmHg). CARDIAC CATH 4/26/2021:  Procedures Performed:   1. Left heart catheterization  2. Selective left and right coronary angiogram  3. Left ventriculography    Procedure Findings:  1. Severe multi vessel coronary artery diease              ~not amenable to PCI  2. Normal left ventricular function with EF estimated at 55-60%  3. Normal left heart hemodynamics   Findings:   1. Left main coronary artery distal 70%. It gave off the left anterior descending artery and left circumflex.   2. Left anterior descending artery has severe atherosclerotic disease.  70% ostial. It was moderate in size. It gave off septal perforators and a moderate sized diagonal branch. The LAD covered the entire apex of the left ventricle.    3. Left circumflex has severe atherosclerotic disease. 99% ostial. It was moderate in size. There was a moderate sized obtuse marginal branch.              ~OM1    4. Right coronary artery has severe atherosclerotic disease. It was moderate in size and was the dominant artery.             ~100% distal PDA    5. Left ventriculogram showed normal LVEF at 50%. Wall motion was normal . There was no significant mitral valve or aortic valve disease noted. LVEDP was normal. There was no gradient noted across the aortic valve during pullback of the catheter.   CONCLUSIONS:   1. Severe multi-vessel coronary artery disease   ASSESSMENT/RECOMMENDATIONS:   1. CABG consult    Principal Problem:    NSTEMI (non-ST elevated myocardial infarction) ([address]  Active Problems:    GERD (gastroesophageal reflux disease)    History of tobacco use    Hyponatremia    Anemia    Hypokalemia    Coronary artery disease    Essential hypertension    Diabetes mellitus ([address]    Sacral wound  Resolved Problems:    * No resolved hospital problems.

## 2021-05-04 NOTE — PROGRESS NOTES
CVTS Cardiothoracic Progress Note:                                CC:  Post op follow up     Surgery: 5/3/2021 Urgent coronary bypass grafting surgery x3 with single greater saphenous vein graft to the posterior ventricular branch of the right coronary artery, separate single greater saphenous vein graft to the second obtuse marginal branch of circumflex, pedicled left internal artery to the LAD. Left atrial appendage obliteration with 40 mm AtriCure left atrial  Clip. Cardiopulmonary bypass. Endoscopic vein harvest of the left greater saphenous vein. Transesophageal echo. Epiaortic ultrasound. Doppler verification of grafts. Bilateral five-level intercostal nerve block with Exparel. Platelet gel application. Sternal plating. Hospital course:   5/4/21 sitting up in bed, intermittently confused but easily reoriented.  Appears comfortable, remains SR, and 96% on RA    Subjective:   Dietary Intake: NPO   no Nausea   Pain Control: controlled  Complaints: mild post op pain  Bowels: have not moved     Past Medical History:   Diagnosis Date    Atherosclerosis of native artery of right lower extremity with rest pain ([address] 07/25/2017    Back pain     Branch retinal vein occlusion 07/20/2012    Bronchiectasis with acute exacerbation (HCC)     Closed compression fracture of thoracic vertebra ([address] 01/15/2020    Closed fracture of facial bone with routine healing 11/21/2016    Closed jaw fracture ([address] 01/15/2020    Community acquired pneumonia of left lower lobe of lung     Compression fracture of L1 lumbar vertebra ([address] 01/15/2020    COPD (chronic obstructive pulmonary disease) (HCC)     Fracture of tibial plateau, closed, left, initial encounter 12/05/2017    Minimally displaced zone I fracture of sacrum ([address] 09/02/2020    Mucus plugging of bronchi     Osteomyelitis of mandible 03/06/2017    Last Assessment & Plan:  Continue ceftriaxone, add flagyl     Osteoporosis with pathological fracture 09/25/2018 Severe RA and osteoporosis. Bone density test last year showed severe osteoporosis. Recently, two broken vertebrae (L1, L2) due to coughing. Diagnosed with tracheomalacia and stated she must cough very hard to clear phlegm. Was coughing due to upper respiratory infections which have been treated. Hx of laminectomy and recent kyphoplasty.    Refractured her jawbone which was previously repaired wi    Post herpetic neuralgia     Proximal humerus fracture 10/01/2019    Rheumatoid arthritis ([address]     Shingles 05/2020    Sleep apnea     Temporal arteritis ([address] 07/10/2013    Tobacco use 10/19/2017    Tracheomalacia     Vitreous hemorrhage, right eye ([address] 02/21/2020        Past Surgical History:   Procedure Laterality Date    ARTERY BIOPSY Right 03/01/2021    at [address]  5/30/13    left temporal artery biopsy    BACK SURGERY  08/2020    BRONCHOSCOPY      BRONCHOSCOPY N/A 6/12/2019    BRONCHOSCOPY ALVEOLAR LAVAGE performed by Wilner Spear MD at [address] GRAFT N/A 5/3/2021    CORONARY ARTERY BYPASS X3 WITH LEFT ATRIAL APPENDAGE CLIP, 5 LEVEL BILATERAL INTERCOSTAL NERVE BLOCK, STERNAL PLATING performed by Jazmyne Carr MD at [address] ARTHROSCOPY      left    KYPHOSIS SURGERY      LAMINECTOMY      MANDIBLE FRACTURE SURGERY      MANDIBLE FRACTURE SURGERY  02/2020    OTHER SURGICAL HISTORY  01/08/2021    sacral wound debridement    PRESSURE ULCER DEBRIDEMENT N/A 1/8/2021    SACRAL WOUND DEBRIDEMENT performed by Karyle Bridgeman, MD at [facility]  5/7/2013    FESS with balloon    SPINAL FUSION      TUBAL LIGATION      UPPER GASTROINTESTINAL ENDOSCOPY  4/8/2014    Dilitation        Allergies as of 04/23/2021 - Review Complete 04/23/2021   Allergen Reaction Noted    Atenolol  02/23/2014        Patient Active Problem List 19.4*   MCV 83.7  --   --   --  85.6  --   --  86.2     --  118*  --  107*  --   --  137    < > = values in this interval not displayed. BMP:   Recent Labs     05/03/21 0328 05/03/21 1334 05/03/21 2030 05/04/21  0014 05/04/21  0450   * 134*  --   --  137   K 4.4 4.4 5.0 4.8 4.9   CL 97* 101  --   --  106   CO2 27 27  --   --  21   BUN 27* 21*  --   --  20   CREATININE 1.1 1.0  --   --  0.9     MG:    Recent Labs     05/03/21 1334 05/03/21 2030   MG 2.90* 2.60*      PT/INR:   Recent Labs     05/03/21 0328 05/03/21  1305 05/04/21  0450   PROTIME 12.3 16.5* 12.1   INR 1.06 1.42* 1.04     APTT:   Recent Labs     05/02/21  0524 05/03/21  0328 05/03/21  1305   APTT 58.3* 75.8* 35.9     CXR: 5/3/21   Impression   Supportive devices project in normal positions.       No pneumothorax.       Vascular congestion.  Bibasilar atelectasis. CXR: 5/4/21   Impression   1. 14 cm left upper lobe soft tissue mass, possibly reflecting a hematoma. CT of the chest recommended for further evaluation. 2. CHF with mild pulmonary edema and or superimposed pneumonia.   ____________________________________________________    Objective:   General appearance: awake, interactive, in NAD  Lungs: diminished L>R  Heart: S1S2 normal; SR on monitor, HR in the 80's  Chest: symmetrical expansion with inspiration and expirations; no rocking of sternum noted   Abdomen: soft, non-tender  Bowel sounds: hypoactive  Kidneys: UOP 9430-323-447= 2140 ml in 24 hrs; Cr 0.9  Wound/Incisions: Midsternal incision with dressing CDI; RLE incisions with dressings CDI; Pacing wires taped and secured  Extremities: BLE pulses palpable; minimal tibial swelling noted   Neurological: intermittent confusion, easily reoriented.   Chest tubes/Drains: Chest tube # 1 with 30-70-40= 140 ml serosanguinous drainage in 24 hours overnight; Chest tube # 2 with 110-230-130= 470 ml serosanguinous drainage in 24 hours overnight; no airleaks noted in either tube Assessment:   Post-op: 1 days. Condition: In stable but critical condition. Plan:   1. Cardiovascular: s/p CABG- BB, ASA, Statin   Will discuss [facility] plan tomorrow with team.     2. Pulmonary: needs expansion- IS, acapella, OOBTC  CXR this morning revealed hematoma on left upper chest. Pt asymptomatic. Will recheck CXR at noon and possibly take back to OR for washout. 3. Neurology: analgesia as needed - hx of chronic back pain    4. Nephrology: diurese as tolerated; continue IV lasix 40 mg BID    5. Endocrinology: insulin gtt    6. Hematology: acute blood loss anemia  H&H 6.5 & 19.4 this morning. Will give two units of RBC and recheck H&HH afterward. 7. Microbiology: nothing at this time    8. Nutrition: NPO until after next CXR and decision is made about possibly going back to OR (as well as timing). 9. Labs: Reviewed as above    10. Post-op Drains/Wires: keeping all for now. 11. D/C Goals: DCP following, too soon to tell     12. Continue post-op care of patient in the ICU    Meds:    The patient is on a beta-blocker   The patient is not on an ace-i/ARB- 2/2 hypotension   The patient is on a statin   The patient is on oral antiplatelet therapy   ____________________________________________________    Sigifredo Shirley CNP  5/4/2021  10:13 AM  We will likely need mediastinal exploration and evacuation of left hemothorax tomorrow morning. Patient remains hemodynamically stable today. Note reviewed, events of last 24 hours reviewed along with vital signs and I/Os and patient examined. Assessment and plans discussed and are as outlined above.      Sujatha Alford MD, FACS, [facility], FACCP, [place]

## 2021-05-04 NOTE — PROGRESS NOTES
[facility] Wound Ostomy Continence Nurse  Follow-up Progress Note       NAME:  Cheng Ross  MEDICAL RECORD NUMBER:  [id number]  AGE:  72 y.o. GENDER:  female  :  1950  TODAY'S DATE:  2021    Subjective: It hurts so much. Wound Identification:  Wound Type: Sacrum - Stage 4 Pressure Injury  Contributing Factors: chronic pressure, decreased mobility, shear force and obesity        Patient Goal of Care:  [x] Wound Healing  [] Odor Control  [] Palliative Care  [] Pain Control   [] Other:     Objective: Postop Day 1 s/p CABG; chest tubes x 2; F/C    /71   Pulse 90   Temp 97.2 °F (36.2 °C)   Resp 20   Ht 5' 10\" (1.778 m)   Wt 160 lb (72.6 kg)   SpO2 96%   BMI 22.96 kg/m²   Sven Risk Score: Sven Scale Score: 18  Assessment: Sacrum - little change from previous assessment   Measurements:  Negative Pressure Wound Therapy Sacrum (Active)   $ Standard NPWT <=50 sq cm PER TX $ Yes 21 1019   Wound Type Pressure ulcer: Stage IV 21 1019   Unit Type [facility] VFVR [id number] 21 1019   Dressing Type Veraflo 21 1019   Number of pieces used 1 21 1019   Cycle Continuous 21 1019   Target Pressure (mmHg) 125 21 1019   Intensity 5 21 0945   Irrigation Solution Vashe 21 1019   Instillation Volume  26 mL 21 1019   Soak Time  6 minutes 21 1019   Vac Frequency 4 hours 21 1019   Canister changed?  Yes 21 1019   Dressing Status Changed 21 1019   Dressing Changed Changed/New 21 1019   Drainage Amount Moderate 21 1019   Drainage Description Serous 21 1019   Dressing Change Due 21 1019   Output (ml) 350 ml 21 1019   Wound Assessment Red 21 1019   Blanca-wound Assessment Maceration 21 1019   Shape oval 21 1606   Odor None 21 1019   Number of days: 103       Wound 20 #2, Sacrum, Pressure Injury, Stage 4, Onset 2020 (Active)   Wound Image 05/03/21 1900   Wound Etiology Pressure Stage  4 05/04/21 0800   Dressing Status Clean; Intact 05/04/21 0400   Wound Cleansed Other (Comment) 05/04/21 0400   Dressing/Treatment Negative pressure wound therapy 05/04/21 0800   Dressing Change Due 04/30/21 05/04/21 0400   Wound Length (cm) 5 cm 04/28/21 1140   Wound Width (cm) 3 cm 04/28/21 1140   Wound Depth (cm) 2.5 cm 04/28/21 1140   Wound Surface Area (cm^2) 15 cm^2 04/28/21 1140   Change in Wound Size % (l*w) -9900 04/28/21 1140   Wound Volume (cm^3) 37.5 cm^3 04/28/21 1140   Wound Healing % -11054 04/28/21 1140   Post-Procedure Length (cm) 3.2 cm 04/21/21 1001   Post-Procedure Width (cm) 1 cm 04/21/21 1001   Post-Procedure Depth (cm) 1.7 cm 04/21/21 1001   Post-Procedure Surface Area (cm^2) 3.2 cm^2 04/21/21 1001   Post-Procedure Volume (cm^3) 5.44 cm^3 04/21/21 1001   Distance Tunneling (cm) 0 cm 05/02/21 0945   Tunneling Position ___ O'Clock 0 05/02/21 0945   Undermining Starts ___ O'Clock 600 05/02/21 0945   Undermining Ends___ O'Clock 1200 05/02/21 0945   Undermining Maxium Distance (cm) 3.2 05/02/21 0945   Wound Assessment Pink/red;Pale granulation tissue 05/02/21 0945   Drainage Amount Small 05/02/21 0945   Drainage Description Serosanguinous 05/02/21 0945   Odor None 05/02/21 0945   Blanca-wound Assessment Blanchable erythema 05/02/21 0945   Margins Defined edges; Unattached edges 05/02/21 0945   Wound Thickness Description not for Pressure Injury Full thickness 05/02/21 0945   Number of days: 137       Response to treatment:  Well tolerated by patient.      Pain Assessment:  Severity:  0 / 10  Quality of pain: N/A  Wound Pain Timing/Severity: none  Premedicated: No  Plan:   Plan of Care: Wound 12/18/20 #2, Sacrum, Pressure Injury, Stage 4, Onset 5/2020-Dressing/Treatment: Negative pressure wound therapy   Removed VAC dressing; cleaned with NS; patted dry; non-sting barrier film around wound and left hip; framed with drape; 1 black foam; covered with draped; tracker pad attached and seal achieved; Veraflow VASHE 26ml; 6 minutes; every 3.5 hrs; 125 mmHg. Dressing to be changed May 7.      Specialty Bed Required : Yes   [x] Low Air Loss   [x] Pressure Redistribution  [] Fluid Immersion  [] Bariatric  [] Total Pressure Relief  [] Other:     Current Diet: Diet NPO Effective Now  Dietician consult:  Yes    Discharge Plan:  Placement for patient upon discharge: skilled nursing   Patient appropriate for Outpatient [address]: Yes current pt at [facility] with Dr. Jack Yin     Referrals:  [x]   [] [address] [employer] [facility]  [] Supplies  [] Other    Patient/Caregiver Teaching:  Level of patient/caregiver understanding able to:   [] Indicates understanding       [] Needs reinforcement  [] Unsuccessful      [] Verbal Understanding  [] Demonstrated understanding       [] No evidence of learning  [] Refused teaching         [] N/A       Electronically signed by Marylu Post RN, CWOCN on 5/4/2021 at 10:20 AM

## 2021-05-05 ENCOUNTER — ANESTHESIA (OUTPATIENT)
Dept: OPERATING ROOM | Age: 71
DRG: 233 | End: 2021-05-05
Payer: MEDICARE

## 2021-05-05 ENCOUNTER — APPOINTMENT (OUTPATIENT)
Dept: GENERAL RADIOLOGY | Age: 71
DRG: 233 | End: 2021-05-05
Attending: INTERNAL MEDICINE
Payer: MEDICARE

## 2021-05-05 VITALS — SYSTOLIC BLOOD PRESSURE: 104 MMHG | TEMPERATURE: 99.9 F | DIASTOLIC BLOOD PRESSURE: 66 MMHG | OXYGEN SATURATION: 89 %

## 2021-05-05 LAB
ABO/RH: NORMAL
ANION GAP SERPL CALCULATED.3IONS-SCNC: 9 MMOL/L (ref 3–16)
ANTIBODY SCREEN: NORMAL
BASE EXCESS ARTERIAL: -1 (ref -3–3)
BLOOD BANK DISPENSE STATUS: NORMAL
BLOOD BANK PRODUCT CODE: NORMAL
BPU ID: NORMAL
BUN BLDV-MCNC: 19 MG/DL (ref 7–20)
CALCIUM IONIZED: 1.1 MMOL/L (ref 1.12–1.32)
CALCIUM SERPL-MCNC: 8 MG/DL (ref 8.3–10.6)
CHLORIDE BLD-SCNC: 102 MMOL/L (ref 99–110)
CO2: 24 MMOL/L (ref 21–32)
CREAT SERPL-MCNC: 1 MG/DL (ref 0.6–1.2)
DESCRIPTION BLOOD BANK: NORMAL
GFR AFRICAN AMERICAN: >60
GFR NON-AFRICAN AMERICAN: 55
GLUCOSE BLD-MCNC: 110 MG/DL (ref 70–99)
GLUCOSE BLD-MCNC: 148 MG/DL (ref 70–99)
GLUCOSE BLD-MCNC: 239 MG/DL (ref 70–99)
GLUCOSE BLD-MCNC: 266 MG/DL (ref 70–99)
GLUCOSE BLD-MCNC: 321 MG/DL (ref 70–99)
GLUCOSE BLD-MCNC: 76 MG/DL (ref 70–99)
GLUCOSE BLD-MCNC: 84 MG/DL (ref 70–99)
GLUCOSE BLD-MCNC: 89 MG/DL (ref 70–99)
HCO3 ARTERIAL: 23.8 MMOL/L (ref 21–29)
HCT VFR BLD CALC: 22.2 % (ref 36–48)
HEMOGLOBIN: 7.4 GM/DL (ref 12–16)
HEMOGLOBIN: 7.6 G/DL (ref 12–16)
LACTATE: 1.07 MMOL/L (ref 0.4–2)
MAGNESIUM: 2.3 MG/DL (ref 1.8–2.4)
MCH RBC QN AUTO: 30.4 PG (ref 26–34)
MCHC RBC AUTO-ENTMCNC: 34.1 G/DL (ref 31–36)
MCV RBC AUTO: 89.1 FL (ref 80–100)
O2 SAT, ARTERIAL: 93 % (ref 93–100)
PCO2 ARTERIAL: 36.6 MM HG (ref 35–45)
PDW BLD-RTO: 16.5 % (ref 12.4–15.4)
PERFORMED ON: ABNORMAL
PERFORMED ON: NORMAL
PERFORMED ON: NORMAL
PH ARTERIAL: 7.42 (ref 7.35–7.45)
PLATELET # BLD: 138 K/UL (ref 135–450)
PMV BLD AUTO: 8.5 FL (ref 5–10.5)
PO2 ARTERIAL: 63.8 MM HG (ref 75–108)
POC HEMATOCRIT: 22 % (ref 36–48)
POC POTASSIUM: 4.8 MMOL/L (ref 3.5–5.1)
POC SAMPLE TYPE: ABNORMAL
POC SODIUM: 135 MMOL/L (ref 136–145)
POTASSIUM SERPL-SCNC: 4.7 MMOL/L (ref 3.5–5.1)
RBC # BLD: 2.49 M/UL (ref 4–5.2)
SODIUM BLD-SCNC: 135 MMOL/L (ref 136–145)
TCO2 ARTERIAL: 25 MMOL/L
WBC # BLD: 8.8 K/UL (ref 4–11)

## 2021-05-05 PROCEDURE — 86900 BLOOD TYPING SEROLOGIC ABO: CPT

## 2021-05-05 PROCEDURE — 6360000002 HC RX W HCPCS: Performed by: ANESTHESIOLOGY

## 2021-05-05 PROCEDURE — 82330 ASSAY OF CALCIUM: CPT

## 2021-05-05 PROCEDURE — 86901 BLOOD TYPING SEROLOGIC RH(D): CPT

## 2021-05-05 PROCEDURE — 84295 ASSAY OF SERUM SODIUM: CPT

## 2021-05-05 PROCEDURE — 35820 EXPLORE CHEST VESSELS: CPT | Performed by: THORACIC SURGERY (CARDIOTHORACIC VASCULAR SURGERY)

## 2021-05-05 PROCEDURE — 36415 COLL VENOUS BLD VENIPUNCTURE: CPT

## 2021-05-05 PROCEDURE — 2580000003 HC RX 258: Performed by: THORACIC SURGERY (CARDIOTHORACIC VASCULAR SURGERY)

## 2021-05-05 PROCEDURE — 84132 ASSAY OF SERUM POTASSIUM: CPT

## 2021-05-05 PROCEDURE — 2500000003 HC RX 250 WO HCPCS: Performed by: ANESTHESIOLOGY

## 2021-05-05 PROCEDURE — 82947 ASSAY GLUCOSE BLOOD QUANT: CPT

## 2021-05-05 PROCEDURE — 6370000000 HC RX 637 (ALT 250 FOR IP): Performed by: NURSE PRACTITIONER

## 2021-05-05 PROCEDURE — 2140000000 HC CCU INTERMEDIATE R&B

## 2021-05-05 PROCEDURE — P9016 RBC LEUKOCYTES REDUCED: HCPCS

## 2021-05-05 PROCEDURE — 2700000000 HC OXYGEN THERAPY PER DAY

## 2021-05-05 PROCEDURE — 83735 ASSAY OF MAGNESIUM: CPT

## 2021-05-05 PROCEDURE — 2500000003 HC RX 250 WO HCPCS: Performed by: THORACIC SURGERY (CARDIOTHORACIC VASCULAR SURGERY)

## 2021-05-05 PROCEDURE — 94761 N-INVAS EAR/PLS OXIMETRY MLT: CPT

## 2021-05-05 PROCEDURE — 85027 COMPLETE CBC AUTOMATED: CPT

## 2021-05-05 PROCEDURE — 2580000003 HC RX 258: Performed by: ANESTHESIOLOGY

## 2021-05-05 PROCEDURE — 3600000018 HC SURGERY OHS ADDTL 15MIN: Performed by: THORACIC SURGERY (CARDIOTHORACIC VASCULAR SURGERY)

## 2021-05-05 PROCEDURE — C1729 CATH, DRAINAGE: HCPCS | Performed by: THORACIC SURGERY (CARDIOTHORACIC VASCULAR SURGERY)

## 2021-05-05 PROCEDURE — 83605 ASSAY OF LACTIC ACID: CPT

## 2021-05-05 PROCEDURE — 3600000008 HC SURGERY OHS BASE: Performed by: THORACIC SURGERY (CARDIOTHORACIC VASCULAR SURGERY)

## 2021-05-05 PROCEDURE — 0WJC0ZZ INSPECTION OF MEDIASTINUM, OPEN APPROACH: ICD-10-PCS | Performed by: THORACIC SURGERY (CARDIOTHORACIC VASCULAR SURGERY)

## 2021-05-05 PROCEDURE — 82803 BLOOD GASES ANY COMBINATION: CPT

## 2021-05-05 PROCEDURE — 3700000000 HC ANESTHESIA ATTENDED CARE: Performed by: THORACIC SURGERY (CARDIOTHORACIC VASCULAR SURGERY)

## 2021-05-05 PROCEDURE — 2500000003 HC RX 250 WO HCPCS: Performed by: NURSE PRACTITIONER

## 2021-05-05 PROCEDURE — 85014 HEMATOCRIT: CPT

## 2021-05-05 PROCEDURE — 3700000001 HC ADD 15 MINUTES (ANESTHESIA): Performed by: THORACIC SURGERY (CARDIOTHORACIC VASCULAR SURGERY)

## 2021-05-05 PROCEDURE — 86850 RBC ANTIBODY SCREEN: CPT

## 2021-05-05 PROCEDURE — 71045 X-RAY EXAM CHEST 1 VIEW: CPT

## 2021-05-05 PROCEDURE — 6370000000 HC RX 637 (ALT 250 FOR IP): Performed by: THORACIC SURGERY (CARDIOTHORACIC VASCULAR SURGERY)

## 2021-05-05 PROCEDURE — 99024 POSTOP FOLLOW-UP VISIT: CPT | Performed by: NURSE PRACTITIONER

## 2021-05-05 PROCEDURE — 6360000002 HC RX W HCPCS: Performed by: THORACIC SURGERY (CARDIOTHORACIC VASCULAR SURGERY)

## 2021-05-05 PROCEDURE — 80048 BASIC METABOLIC PNL TOTAL CA: CPT

## 2021-05-05 PROCEDURE — 2709999900 HC NON-CHARGEABLE SUPPLY: Performed by: THORACIC SURGERY (CARDIOTHORACIC VASCULAR SURGERY)

## 2021-05-05 PROCEDURE — 86923 COMPATIBILITY TEST ELECTRIC: CPT

## 2021-05-05 PROCEDURE — 2720000010 HC SURG SUPPLY STERILE: Performed by: THORACIC SURGERY (CARDIOTHORACIC VASCULAR SURGERY)

## 2021-05-05 RX ORDER — SODIUM CHLORIDE, SODIUM LACTATE, POTASSIUM CHLORIDE, CALCIUM CHLORIDE 600; 310; 30; 20 MG/100ML; MG/100ML; MG/100ML; MG/100ML
INJECTION, SOLUTION INTRAVENOUS CONTINUOUS PRN
Status: DISCONTINUED | OUTPATIENT
Start: 2021-05-05 | End: 2021-05-05 | Stop reason: SDUPTHER

## 2021-05-05 RX ORDER — ETOMIDATE 2 MG/ML
INJECTION INTRAVENOUS PRN
Status: DISCONTINUED | OUTPATIENT
Start: 2021-05-05 | End: 2021-05-05 | Stop reason: SDUPTHER

## 2021-05-05 RX ORDER — MAGNESIUM HYDROXIDE 1200 MG/15ML
LIQUID ORAL CONTINUOUS PRN
Status: COMPLETED | OUTPATIENT
Start: 2021-05-05 | End: 2021-05-05

## 2021-05-05 RX ORDER — FENTANYL CITRATE 50 UG/ML
INJECTION, SOLUTION INTRAMUSCULAR; INTRAVENOUS PRN
Status: DISCONTINUED | OUTPATIENT
Start: 2021-05-05 | End: 2021-05-05 | Stop reason: SDUPTHER

## 2021-05-05 RX ORDER — ONDANSETRON 2 MG/ML
INJECTION INTRAMUSCULAR; INTRAVENOUS PRN
Status: DISCONTINUED | OUTPATIENT
Start: 2021-05-05 | End: 2021-05-05 | Stop reason: SDUPTHER

## 2021-05-05 RX ORDER — SODIUM CHLORIDE 9 MG/ML
INJECTION, SOLUTION INTRAVENOUS PRN
Status: DISCONTINUED | OUTPATIENT
Start: 2021-05-05 | End: 2021-05-06

## 2021-05-05 RX ORDER — ROCURONIUM BROMIDE 10 MG/ML
INJECTION, SOLUTION INTRAVENOUS PRN
Status: DISCONTINUED | OUTPATIENT
Start: 2021-05-05 | End: 2021-05-05 | Stop reason: SDUPTHER

## 2021-05-05 RX ORDER — DEXMEDETOMIDINE HYDROCHLORIDE 4 UG/ML
.2-1.4 INJECTION, SOLUTION INTRAVENOUS CONTINUOUS
Status: DISCONTINUED | OUTPATIENT
Start: 2021-05-05 | End: 2021-05-06

## 2021-05-05 RX ORDER — PHENYLEPHRINE HCL IN 0.9% NACL 1 MG/10 ML
SYRINGE (ML) INTRAVENOUS PRN
Status: DISCONTINUED | OUTPATIENT
Start: 2021-05-05 | End: 2021-05-05 | Stop reason: SDUPTHER

## 2021-05-05 RX ADMIN — SODIUM CHLORIDE, SODIUM LACTATE, POTASSIUM CHLORIDE, AND CALCIUM CHLORIDE: .6; .31; .03; .02 INJECTION, SOLUTION INTRAVENOUS at 08:32

## 2021-05-05 RX ADMIN — MUPIROCIN: 20 OINTMENT TOPICAL at 23:56

## 2021-05-05 RX ADMIN — FENTANYL CITRATE 50 MCG: 50 INJECTION, SOLUTION INTRAMUSCULAR; INTRAVENOUS at 09:59

## 2021-05-05 RX ADMIN — ETOMIDATE 10 MG: 2 INJECTION INTRAVENOUS at 08:45

## 2021-05-05 RX ADMIN — CEFAZOLIN 2000 MG: 10 INJECTION, POWDER, FOR SOLUTION INTRAVENOUS at 03:16

## 2021-05-05 RX ADMIN — MORPHINE SULFATE 15 MG: 15 TABLET, FILM COATED, EXTENDED RELEASE ORAL at 15:22

## 2021-05-05 RX ADMIN — Medication 10 ML: at 23:44

## 2021-05-05 RX ADMIN — CEFAZOLIN SODIUM 2 G: 10 INJECTION, POWDER, FOR SOLUTION INTRAVENOUS at 08:32

## 2021-05-05 RX ADMIN — ROFLUMILAST 500 MCG: 500 TABLET ORAL at 23:45

## 2021-05-05 RX ADMIN — FENTANYL CITRATE 25 MCG: 50 INJECTION, SOLUTION INTRAMUSCULAR; INTRAVENOUS at 08:53

## 2021-05-05 RX ADMIN — ONDANSETRON 4 MG: 2 INJECTION INTRAMUSCULAR; INTRAVENOUS at 09:42

## 2021-05-05 RX ADMIN — FAMOTIDINE 20 MG: 10 INJECTION, SOLUTION INTRAVENOUS at 21:01

## 2021-05-05 RX ADMIN — MIDAZOLAM 1 MG: 1 INJECTION INTRAMUSCULAR; INTRAVENOUS at 11:02

## 2021-05-05 RX ADMIN — Medication 0.7 MCG/KG/HR: at 17:36

## 2021-05-05 RX ADMIN — FENTANYL CITRATE 25 MCG: 50 INJECTION, SOLUTION INTRAMUSCULAR; INTRAVENOUS at 08:43

## 2021-05-05 RX ADMIN — ATORVASTATIN CALCIUM 40 MG: 40 TABLET, FILM COATED ORAL at 21:01

## 2021-05-05 RX ADMIN — Medication 50 MCG: at 09:18

## 2021-05-05 RX ADMIN — MORPHINE SULFATE 15 MG: 15 TABLET, FILM COATED, EXTENDED RELEASE ORAL at 21:00

## 2021-05-05 RX ADMIN — INSULIN GLARGINE 11 UNITS: 100 INJECTION, SOLUTION SUBCUTANEOUS at 23:44

## 2021-05-05 RX ADMIN — FAMOTIDINE 20 MG: 10 INJECTION, SOLUTION INTRAVENOUS at 15:26

## 2021-05-05 RX ADMIN — FUROSEMIDE 40 MG: 10 INJECTION, SOLUTION INTRAMUSCULAR; INTRAVENOUS at 18:57

## 2021-05-05 RX ADMIN — Medication 50 MCG: at 10:02

## 2021-05-05 RX ADMIN — Medication 100 MCG: at 09:02

## 2021-05-05 RX ADMIN — Medication 100 MCG: at 08:56

## 2021-05-05 RX ADMIN — MORPHINE SULFATE 4 MG: 4 INJECTION, SOLUTION INTRAMUSCULAR; INTRAVENOUS at 01:25

## 2021-05-05 RX ADMIN — ETOMIDATE 10 MG: 2 INJECTION INTRAVENOUS at 08:43

## 2021-05-05 RX ADMIN — Medication 15 ML: at 23:08

## 2021-05-05 RX ADMIN — MORPHINE SULFATE 4 MG: 4 INJECTION, SOLUTION INTRAMUSCULAR; INTRAVENOUS at 03:49

## 2021-05-05 RX ADMIN — ROCURONIUM BROMIDE 50 MG: 10 SOLUTION INTRAVENOUS at 08:46

## 2021-05-05 RX ADMIN — LATANOPROST 1 DROP: 50 SOLUTION OPHTHALMIC at 23:08

## 2021-05-05 RX ADMIN — ACETAMINOPHEN 650 MG: 325 TABLET ORAL at 15:43

## 2021-05-05 RX ADMIN — MORPHINE SULFATE 4 MG: 4 INJECTION, SOLUTION INTRAMUSCULAR; INTRAVENOUS at 11:02

## 2021-05-05 RX ADMIN — GABAPENTIN 300 MG: 300 CAPSULE ORAL at 21:00

## 2021-05-05 RX ADMIN — SUGAMMADEX 200 MG: 100 INJECTION, SOLUTION INTRAVENOUS at 09:54

## 2021-05-05 RX ADMIN — INSULIN LISPRO 4 UNITS: 100 INJECTION, SOLUTION INTRAVENOUS; SUBCUTANEOUS at 19:10

## 2021-05-05 RX ADMIN — MORPHINE SULFATE 4 MG: 4 INJECTION, SOLUTION INTRAMUSCULAR; INTRAVENOUS at 12:59

## 2021-05-05 RX ADMIN — Medication 0.4 MCG/KG/HR: at 11:45

## 2021-05-05 ASSESSMENT — PULMONARY FUNCTION TESTS
PIF_VALUE: 30
PIF_VALUE: 23
PIF_VALUE: 30
PIF_VALUE: 1
PIF_VALUE: 27
PIF_VALUE: 25
PIF_VALUE: 8
PIF_VALUE: 4
PIF_VALUE: 0
PIF_VALUE: 26
PIF_VALUE: 27
PIF_VALUE: 14
PIF_VALUE: 1
PIF_VALUE: 26
PIF_VALUE: 29
PIF_VALUE: 4
PIF_VALUE: 26
PIF_VALUE: 4
PIF_VALUE: 5
PIF_VALUE: 26
PIF_VALUE: 27
PIF_VALUE: 1
PIF_VALUE: 30
PIF_VALUE: 27
PIF_VALUE: 4
PIF_VALUE: 21
PIF_VALUE: 1
PIF_VALUE: 25
PIF_VALUE: 24
PIF_VALUE: 1
PIF_VALUE: 27
PIF_VALUE: 23
PIF_VALUE: 26
PIF_VALUE: 26
PIF_VALUE: 3
PIF_VALUE: 3
PIF_VALUE: 25
PIF_VALUE: 30
PIF_VALUE: 24
PIF_VALUE: 25
PIF_VALUE: 26
PIF_VALUE: 1
PIF_VALUE: 1
PIF_VALUE: 27
PIF_VALUE: 26
PIF_VALUE: 4
PIF_VALUE: 22
PIF_VALUE: 26
PIF_VALUE: 28
PIF_VALUE: 22
PIF_VALUE: 4
PIF_VALUE: 4
PIF_VALUE: 25
PIF_VALUE: 29
PIF_VALUE: 20
PIF_VALUE: 23
PIF_VALUE: 26
PIF_VALUE: 0
PIF_VALUE: 4
PIF_VALUE: 4
PIF_VALUE: 25
PIF_VALUE: 28
PIF_VALUE: 26
PIF_VALUE: 4
PIF_VALUE: 1

## 2021-05-05 ASSESSMENT — PAIN SCALES - GENERAL: PAINLEVEL_OUTOF10: 10

## 2021-05-05 NOTE — ANESTHESIA POSTPROCEDURE EVALUATION
Department of Anesthesiology  Postprocedure Note    Patient: Cheng Ross  MRN: [id number]  YOB: 1950  Date of evaluation: 5/5/2021  Time:  10:40 AM     Procedure Summary     Date: 05/05/21 Room / Location: [facility] [address] [place] [address]    Anesthesia Start: 0977 Anesthesia Stop: 2536    Procedure: MEDIASTINAL EXPLORATION AND EVACUATION OF HEMATOMA (N/A ) Diagnosis:       Hematoma      (HEMATOMA)    Surgeons: Matilde Ceron MD Responsible Provider: Veronica Kennedy MD    Anesthesia Type: general ASA Status: 4 - Emergent          Anesthesia Type: general    Pratik Phase I:      Pratik Phase II:      Last vitals: Reviewed and per EMR flowsheets.        Anesthesia Post Evaluation    Patient location during evaluation: ICU  Patient participation: complete - patient participated  Level of consciousness: awake and alert  Pain score: 5 (back pain)  Airway patency: patent  Nausea & Vomiting: no nausea and no vomiting  Complications: no  Cardiovascular status: blood pressure returned to baseline  Respiratory status: acceptable  Hydration status: stable

## 2021-05-05 NOTE — BRIEF OP NOTE
Brief Postoperative Note      Patient: Aliya Bustillos  YOB: 1950  MRN: [id number]    Date of Procedure: 5/5/2021    Pre-Op Diagnosis: HEMATOMA    Post-Op Diagnosis: Same       Procedure(s):  MEDIASTINAL EXPLORATION AND EVACUATION OF HEMATOMA        Assistant:  First Assistant: Emmie Mckeon    Anesthesia: General    Estimated Blood Loss (mL): 5726    Complications: Bleeding    Specimens:   * No specimens in log *    Implants:  * No implants in log *      Drains:   Chest Tube 1 Mediastinal 32 Belgian (Active)   Suction -20 cm H2O 05/04/21 2000   Chest Tube Airleak Yes 05/05/21 0400   Drainage Description Serosanguinous 05/05/21 0400   Dressing Status Clean;Dry; Intact 05/05/21 0400   Dressing Type Dry dressing 05/05/21 0400   Dressing Change Due 05/05/21 05/05/21 0400   Site Assessment Not assessed 05/05/21 0400   Surrounding Skin Unable to view 05/05/21 0400   Patency Intervention Tip/Tilt 05/04/21 2000   Output (ml) 20 ml 05/05/21 1155       Chest Tube 2 Left Pleural 32 Belgian (Active)   Suction -20 cm H2O 05/04/21 2000   Chest Tube Airleak Yes 05/05/21 0400   Drainage Description Serosanguinous 05/05/21 0400   Dressing Status Clean;Dry; Intact 05/05/21 0400   Dressing Type Dry dressing 05/05/21 0400   Site Assessment Not assessed 05/05/21 0400   Surrounding Skin Unable to view 05/05/21 0400   Patency Intervention Tip/Tilt 05/04/21 2000   Output (ml) 110 ml 05/05/21 1155       Chest Tube 3 Right Pleural 24 Belgian (Active)   Output (ml) 150 ml 05/05/21 1155       Closed/Suction Drain Left Leg Bulb (Active)   Output (ml) 0 ml 05/05/21 0600       Negative Pressure Wound Therapy Sacrum (Active)   $ Standard NPWT <=50 sq cm PER TX $ Yes 05/04/21 1019   Wound Type Pressure ulcer: Stage IV 05/04/21 1602   Unit Type [facility] [id number] 05/04/21 1019   Dressing Type Veraflo 05/04/21 1602   Number of pieces used 1 05/04/21 1019   Cycle Continuous 05/04/21 1602   Target Pressure (mmHg) 125 05/04/21 1602 Intensity 5 05/02/21 0945   Irrigation Solution Vashe 05/04/21 1019   Instillation Volume  26 mL 05/04/21 1019   Soak Time  6 minutes 05/04/21 1019   Vac Frequency 4 hours 05/04/21 1019   Canister changed? Yes 05/04/21 1019   Dressing Status Changed 05/04/21 1019   Dressing Changed Changed/New 05/04/21 1019   Drainage Amount Moderate 05/04/21 1019   Drainage Description Serous 05/04/21 1019   Dressing Change Due 05/07/21 05/04/21 1019   Output (ml) 350 ml 05/04/21 1019   Wound Assessment Red 05/04/21 1019   Blanca-wound Assessment Maceration 05/04/21 1019   Shape oval 04/26/21 1606   Odor None 05/04/21 1019       Urethral Catheter Latex; Temperature probe 16 fr (Active)   Catheter Indications Need for fluid volume management of the critically ill patient in a critical care setting 05/05/21 0400   Urine Color Yellow 05/05/21 0400   Urine Appearance Clear 05/05/21 0400   Output (mL) 350 mL 05/05/21 0600       Findings:     Electronically signed by Viviane Gandara MD on 5/5/2021 at 12:16 PM

## 2021-05-05 NOTE — ANESTHESIA PRE PROCEDURE
Department of Anesthesiology  Preprocedure Note       Name:  Marietta Hadley   Age:  72 y.o.  :  1950                                          MRN:  [id number]         Date:  2021      Surgeon: Anderson Giang):  Silvana Handy MD    Procedure: Procedure(s):  MEDIASTINAL EXPLORATION AND EVACUATION OF HEMATOMA    Medications prior to admission:   Prior to Admission medications    Medication Sig Start Date End Date Taking? Authorizing Provider   oxyCODONE-acetaminophen (PERCOCET)  MG per tablet Take 1 tablet by mouth 2 times daily. Yes Historical Provider, MD   metroNIDAZOLE (FLAGYL) 250 MG tablet Crush one tablet into a fine powder, sprinkle in wound three times per week with dressing changes, as needed for malodor. 21  Yes Daryl Arroyo MD   docusate sodium (COLACE) 100 MG capsule Take 100 mg by mouth 2 times daily as needed for Constipation   Yes Historical Provider, MD   DULoxetine (CYMBALTA) 60 MG extended release capsule Take 60 mg by mouth daily   Yes Historical Provider, MD   gabapentin (NEURONTIN) 300 MG capsule Take 300 mg by mouth 2 times daily. Yes Historical Provider, MD   latanoprost (XALATAN) 0.005 % ophthalmic solution Place 1 drop into both eyes nightly   Yes Historical Provider, MD   polyethylene glycol (GLYCOLAX) 17 g packet Take 17 g by mouth daily as needed for Constipation   Yes Historical Provider, MD   Teriparatide, Recombinant, (FORTEO) 600 MCG/2.4ML SOPN injection Inject 20 mcg into the skin daily   Yes Historical Provider, MD   torsemide (DEMADEX) 20 MG tablet Take 40 mg by mouth daily   Yes Historical Provider, MD   azithromycin (ZITHROMAX) 250 MG tablet TAKE 1 TABLET BY MOUTH EVERY DAY 20  Yes Ashley Humphries MD   morphine (MS CONTIN) 15 MG extended release tablet 15 mg 2 times daily.   10/16/20  Yes Historical Provider, MD   ipratropium (ATROVENT) 0.06 % nasal spray USE 2 SPRAYS BY NASAL ROUTE 2-4 TIMES DAILY 10/29/20  Yes Ashley Humphries MD   Roflumilast (DALIRESP) 500 MCG tablet Take 1 tablet by mouth daily 5/19/20  Yes Pancho Orantes MD   albuterol sulfate  (90 Base) MCG/ACT inhaler INHALE 2 PUFFS INTO THE LUNGS EVERY 4 HOURS AS NEEDED FOR WHEEZING 1/20/20  Yes Keven Reddy MD   vitamin D (CHOLECALCIFEROL) 1000 UNIT TABS tablet Take 5,000 Units by mouth daily    Yes Historical Provider, MD   calcium carbonate (OSCAL) 500 MG TABS tablet Take 500 mg by mouth daily   Yes Historical Provider, MD   atorvastatin (LIPITOR) 40 MG tablet Take 40 mg by mouth 10/16/18  Yes Historical Provider, MD   cyclobenzaprine (FLEXERIL) 10 MG tablet Take 10 mg by mouth 2 times daily as needed    Yes Historical Provider, MD   Insulin Syringe-Needle U-100 31G X 5/16\" 0.5 ML MISC USE 5 TIMES DAILY 5/30/18  Yes Historical Provider, MD   meloxicam (MOBIC) 15 MG tablet Patient states taking only as needed 4/7/18  Yes Historical Provider, MD   metoprolol succinate (TOPROL XL) 25 MG extended release tablet Take 25 mg by mouth daily   Yes Historical Provider, MD   insulin glargine (LANTUS) 100 UNIT/ML injection vial Inject 30 Units into the skin nightly Per pt, takes 20 or 30u nightly 10/23/17  Yes Sofía Munson MD   aspirin EC 81 MG EC tablet Take 1 tablet by mouth daily 10/23/17  Yes Sofía Munson MD   insulin lispro (HUMALOG) 100 UNIT/ML injection vial Inject 0-12 Units into the skin 3 times daily (with meals) 10/23/17  Yes Sofía Munson MD   fluticasone (FLONASE) 50 MCG/ACT nasal spray INHALE 2 SPRAYS IN EACH NOSTRIL DAILY 11/25/13  Yes Pancho Orantes MD   omeprazole (PRILOSEC) 40 MG capsule Take 40 mg by mouth daily    Yes Historical Provider, MD   losartan (COZAAR) 50 MG tablet Take 50 mg by mouth daily    Historical Provider, MD   NARCAN 4 MG/0.1ML LIQD nasal spray PLEASE SEE ATTACHED FOR DETAILED DIRECTIONS 10/20/20   Historical Provider, MD   ACCU-CHEK CEDRICK PLUS strip TEST 4 TIMES DAILY 6/1/18   Historical Provider, MD   Misc.  Devices (ACAPELLA) MISC Take 1 Device by mouth as needed 9/2/15   Qiana Sheppard, APRN - CNP   cetirizine (ZYRTEC) 10 MG tablet Take 10 mg by mouth daily. Historical Provider, MD   etanercept (ENBREL) 50 MG/ML injection Inject 50 mg into the skin every 7 days.     Historical Provider, MD       Current medications:    Current Facility-Administered Medications   Medication Dose Route Frequency Provider Last Rate Last Admin    ceFAZolin (ANCEF) 2000 mg in dextrose 5 % 100 mL IVPB  2,000 mg Intravenous On Call to . Leanne Johnson 135, APRN - CNP        famotidine (PEPCID) injection 20 mg  20 mg Intravenous BID Sumi Walters MD   20 mg at 05/04/21 2112    0.9 % sodium chloride infusion   Intravenous PRN Sumi Walters MD        0.9 % sodium chloride infusion   Intravenous PRN Stephany Casas APRN - CNP        lactated ringers infusion   Intravenous Continuous Sumi Walters MD        sodium chloride flush 0.9 % injection 10 mL  10 mL Intravenous 2 times per day Sumi Walters MD   10 mL at 05/04/21 2111    sodium chloride flush 0.9 % injection 10 mL  10 mL Intravenous PRN Sumi Walters MD        0.9 % sodium chloride infusion  25 mL Intravenous PRN Sumi Walters MD        [Held by provider] fondaparinux (ARIXTRA) injection 2.5 mg  2.5 mg Subcutaneous Daily Sumi Walters MD   2.5 mg at 05/04/21 0824    ondansetron (ZOFRAN) injection 4 mg  4 mg Intravenous Q8H PRN Sumi Walters MD   4 mg at 05/04/21 0815    aspirin EC tablet 81 mg  81 mg Oral Daily Sumi Walters MD   81 mg at 05/04/21 1145    acetaminophen (TYLENOL) tablet 650 mg  650 mg Oral Q6H Sumi Walters MD   Stopped at 05/05/21 0138    oxyCODONE (ROXICODONE) immediate release tablet 5 mg  5 mg Oral Q4H PRN Sumi Walters MD        Or   Ann Marie Frost oxyCODONE (ROXICODONE) immediate release tablet 10 mg  10 mg Oral Q4H PRN Sumi Walters MD        morphine (PF) injection 2 mg  2 mg Intravenous Q2H PRN Sumi Walters MD   2 mg at 05/04/21 0442    Or    morphine (PF) injection 4 mg  4 mg Intravenous Q2H PRN Adriel Dempsey MD   4 mg at 05/05/21 0349    chlorhexidine (PERIDEX) 0.12 % solution 15 mL  15 mL Mouth/Throat BID Adriel Dempsey MD   15 mL at 05/04/21 2111    furosemide (LASIX) injection 40 mg  40 mg Intravenous BID Adriel Dempsey MD   40 mg at 05/04/21 1628    magnesium oxide (MAG-OX) tablet 400 mg  400 mg Oral Daily Adriel Dempsey MD   400 mg at 05/04/21 1144    mupirocin (BACTROBAN) 2 % ointment   Nasal BID Adriel Dempsey MD   Given at 05/04/21 2111    nitroGLYCERIN (NITRODUR) 0.2 MG/HR 1 patch  1 patch Transdermal Daily Adriel Dempsey MD        potassium chloride (KLOR-CON M) extended release tablet 10 mEq  10 mEq Oral TID WC Adrile Dempsey MD   10 mEq at 05/04/21 1628    midazolam (VERSED) injection 1 mg  1 mg Intravenous Q1H PRN Adriel Dempsey MD   1 mg at 05/04/21 1207    diphenhydrAMINE (BENADRYL) tablet 25 mg  25 mg Oral Nightly PRN Adriel Dempsey MD        polyethylene glycol [facility]) packet 17 g  17 g Oral Daily Adriel Dempsey MD        bisacodyl (DULCOLAX) EC tablet 5 mg  5 mg Oral Daily Adriel Dempsey MD        bisacodyl (DULCOLAX) suppository 10 mg  10 mg Rectal Daily PRN Adriel Dempsey MD        metoprolol tartrate (LOPRESSOR) tablet 12.5 mg  12.5 mg Oral BID Adriel Dempsey MD   Stopped at 05/04/21 2110    losartan (COZAAR) tablet 25 mg  25 mg Oral Daily Adriel Dempsey MD   25 mg at 05/04/21 1144    potassium chloride 20 mEq/50 mL IVPB (Central Line)  20 mEq Intravenous PRN Adriel Dempsey MD        magnesium sulfate 2000 mg in 50 mL IVPB premix  2,000 mg Intravenous PRN Adriel Dempsey MD        calcium chloride 1,000 mg in sodium chloride 0.9 % 100 mL IVPB  1,000 mg Intravenous PRN Adriel Dempsey MD        calcium chloride 2,000 mg in sodium chloride 0.9 % 100 mL IVPB  2,000 mg Intravenous PRN Adriel Dempsey MD        albuterol sulfate  (90 Base) MCG/ACT inhaler 2 puff  2 puff Inhalation Q6H PRN Melani Ng MD        albumin human 5 % IV solution 25 g  25 g Intravenous PRN Melani Ng MD   Stopped at 05/03/21 1421    lactated ringers bolus  250 mL Intravenous Continuous PRN Melani Ng MD        DOBUTamine (DOBUTREX) 500 mg in dextrose 5 % 250 mL infusion  2 mcg/kg/min Intravenous Continuous PRN Melani Ng MD        phenylephrine (JOSE-SYNEPHRINE) 50 mg in dextrose 5 % 250 mL infusion  10 mcg/min Intravenous Continuous PRN Melani Ng MD        milrinone [facility]) 20 mg in dextrose 5 % 100 mL infusion  0.375 mcg/kg/min Intravenous Continuous Melani Ng MD   Stopped at 05/03/21 1940    norepinephrine (LEVOPHED) 16 mg in dextrose 5 % 250 mL infusion  2 mcg/min Intravenous Continuous PRN Melani Ng MD   Stopped at 05/04/21 0200    EPINEPHrine (EPINEPHrine HCL) 5 mg in dextrose 5 % 250 mL infusion  1 mcg/min Intravenous Continuous PRN Melani Ng MD        nitroGLYCERIN 50 mg in dextrose 5% 250 mL infusion  10 mcg/min Intravenous Continuous PRN Melani Ng MD        insulin regular (HUMULIN R;NOVOLIN R) 100 Units in sodium chloride 0.9 % 100 mL infusion  1 Units/hr Intravenous Continuous Melani Ng MD 1 mL/hr at 05/05/21 0314 1 Units/hr at 05/05/21 0314    insulin glargine (LANTUS) injection vial 11 Units  0.15 Units/kg Subcutaneous Nightly Melani Ng MD        insulin lispro (HUMALOG) injection vial 0-6 Units  0-6 Units Subcutaneous TID WC Melani Ng MD        insulin lispro (HUMALOG) injection vial 0-3 Units  0-3 Units Subcutaneous Nightly Melani Ng MD        glucose (GLUTOSE) 40 % oral gel 15 g  15 g Oral PRN Melani Ng MD        dextrose 50 % IV solution  12.5 g Intravenous PRN Melani Ng MD        glucagon (rDNA) injection 1 mg  1 mg Intramuscular PRN Melani Ng MD        dextrose 5 % solution  100 mL/hr Intravenous PRN Melani Ng MD        morphine (MS CONTIN) extended release tablet 15 mg  15 mg Oral 2 times per day Denise Epps MD   15 mg at 05/04/21 2109    Roflumilast (DALIRESP) tablet 500 mcg  500 mcg Oral Nightly Denise Epps MD   500 mcg at 05/04/21 2116    atorvastatin (LIPITOR) tablet 40 mg  40 mg Oral Nightly Denise Epps MD   40 mg at 05/04/21 2109    gabapentin (NEURONTIN) capsule 300 mg  300 mg Oral BID Denise Epps MD   300 mg at 05/04/21 2109    melatonin disintegrating tablet 5 mg  5 mg Oral Nightly PRN Denise Epps MD   5 mg at 04/30/21 2103    latanoprost (XALATAN) 0.005 % ophthalmic solution 1 drop  1 drop Both Eyes Daily Denise Epps MD   1 drop at 05/04/21 2111    vashe wound therapy external solution   Irrigation Continuous PRN Denise Epps MD        cyclobenzaprine (FLEXERIL) tablet 10 mg  10 mg Oral BID PRN Denise Epps MD   10 mg at 05/04/21 1218    DULoxetine (CYMBALTA) extended release capsule 60 mg  60 mg Oral Daily Denise Epps MD   60 mg at 05/04/21 1145    acetaminophen (TYLENOL) tablet 650 mg  650 mg Oral Q6H PRN Denise Epps MD   650 mg at 05/04/21 1218    Or    acetaminophen (TYLENOL) suppository 650 mg  650 mg Rectal Q6H PRN Denise Epps MD           Allergies:     Allergies   Allergen Reactions    Atenolol      Cough         Problem List:    Patient Active Problem List   Diagnosis Code    Rheumatoid arthritis ([facility] M06.9    Psoriasis L40.9    GERD (gastroesophageal reflux disease) K21.9    History of tobacco use Z87.891    Hyponatremia E87.1    Anemia D64.9    Cylindrical bronchiectasis (HCC) J47.9    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    Immunocompromised state ([facility] D84.9    Hypokalemia E87.6    Coronary artery disease I25.10    Stasis edema of both lower extremities I87.303    Essential hypertension I10    Lumbar spondylosis M47.816    Mitral valve insufficiency and aortic valve insufficiency I08.0    Mixed hyperlipidemia E78.2    Myopia of both eyes H52.13    Osteoporosis M81.0    Other chronic sinusitis J32.8    Primary open angle glaucoma (POAG) of both eyes, mild stage H40.1131    Primary osteoarthritis of right hip M16.11    Diabetes mellitus ([address] E11.9    Type 2 diabetes mellitus with unspecified diabetic retinopathy without macular edema ([facility]) E11.319    Uncontrolled type 2 diabetes mellitus with diabetic peripheral angiopathy without gangrene, with long-term current use of insulin ([facility]) E11.51, E11.65, Z79.4    Pressure ulcer of coccygeal region, stage 4 ([facility]) L89.154    NSTEMI (non-ST elevated myocardial infarction) ([address] I21.4    Sacral wound S31.000A       Past Medical History:        Diagnosis Date    Atherosclerosis of native artery of right lower extremity with rest pain ([address] 07/25/2017    Back pain     Branch retinal vein occlusion 07/20/2012    Bronchiectasis with acute exacerbation ([facility])     Closed compression fracture of thoracic vertebra ([address] 01/15/2020    Closed fracture of facial bone with routine healing 11/21/2016    Closed jaw fracture ([address] 01/15/2020    Community acquired pneumonia of left lower lobe of lung     Compression fracture of L1 lumbar vertebra ([address] 01/15/2020    COPD (chronic obstructive pulmonary disease) ([facility])     Fracture of tibial plateau, closed, left, initial encounter 12/05/2017    Minimally displaced zone I fracture of sacrum ([facility]) 09/02/2020    Mucus plugging of bronchi     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 Bone density test last year showed severe osteoporosis. Recently, two broken vertebrae (L1, L2) due to coughing. Diagnosed with tracheomalacia and stated she must cough very hard to clear phlegm. Was coughing due to upper respiratory infections which have been treated. Hx of laminectomy and recent kyphoplasty.    Refractured her jawbone which was previously repaired wi    Post herpetic neuralgia     Proximal humerus fracture 10/01/2019    [facility] arthritis [facility]     Shingles 2020    Sleep apnea     Temporal arteritis ([facility] 07/10/2013    Tobacco use 10/19/2017    Tracheomalacia     Vitreous hemorrhage, right eye ([facility] 2020       Past Surgical History:        Procedure Laterality Date    ARTERY BIOPSY Right 2021    at [address]  13    left temporal artery biopsy    BACK SURGERY  2020    BRONCHOSCOPY      BRONCHOSCOPY N/A 2019    BRONCHOSCOPY ALVEOLAR LAVAGE performed by Odalys Rice MD at [address] GRAFT N/A 5/3/2021    CORONARY ARTERY BYPASS X3 WITH LEFT ATRIAL APPENDAGE CLIP, 5 LEVEL BILATERAL INTERCOSTAL NERVE BLOCK, STERNAL PLATING performed by AnnM arie Childers MD at [address] [facility]      KNEE ARTHROSCOPY      left    KYPHOSIS SURGERY      LAMINECTOMY      MANDIBLE FRACTURE SURGERY      MANDIBLE FRACTURE SURGERY  2020    OTHER SURGICAL HISTORY  2021    sacral wound debridement    PRESSURE ULCER DEBRIDEMENT N/A 2021    SACRAL WOUND DEBRIDEMENT performed by Sivan Johnson MD at [facility] SEPTOPLASTY  2013    FESS with balloon    SPINAL FUSION      TUBAL LIGATION      UPPER GASTROINTESTINAL ENDOSCOPY  2014    Dilitation       Social History:    Social History     Tobacco Use    Smoking status: Former Smoker     Packs/day: 1.00     Years: 20.00     Pack years: 20.00     Types: Cigarettes     Quit date: 1991     Years since quittin.0    Smokeless tobacco: Never Used   Substance Use Topics    Alcohol use: Not Currently     Alcohol/week: 0.0 standard drinks     Comment: rarely                                Counseling given: Not Answered      Vital Signs (Current):   Vitals:    21 0300 21 0400 21 0500 05/05/21 0600   BP: (!) 91/39  (!) 104/52 114/60   Pulse: 84  83 86   Resp: 18 19 22   Temp:  100 °F (37.8 °C)     TempSrc:  Bladder     SpO2: (!) 88%  96% 91%   Weight:   173 lb 8 oz (78.7 kg)    Height:                                                  BP Readings from Last 3 Encounters:   05/05/21 114/60   05/03/21 (!) 120/53   04/23/21 (!) 91/48       NPO Status:                                                                                 BMI:   Wt Readings from Last 3 Encounters:   05/05/21 173 lb 8 oz (78.7 kg)   04/23/21 164 lb (74.4 kg)   04/21/21 164 lb (74.4 kg)     Body mass index is 24.89 kg/m². CBC:   Lab Results   Component Value Date    WBC 8.8 05/05/2021    RBC 2.49 05/05/2021    HGB 7.6 05/05/2021    HCT 22.2 05/05/2021    MCV 89.1 05/05/2021    RDW 16.5 05/05/2021     05/05/2021       CMP:   Lab Results   Component Value Date     05/05/2021    K 4.7 05/05/2021    K 2.7 04/25/2021     05/05/2021    CO2 24 05/05/2021    BUN 19 05/05/2021    CREATININE 1.0 05/05/2021    GFRAA >60 05/05/2021    GFRAA >60 05/07/2013    AGRATIO 0.9 05/03/2021    LABGLOM 55 05/05/2021    GLUCOSE 84 05/05/2021    PROT 6.3 05/03/2021    PROT 7.1 03/21/2013    CALCIUM 8.0 05/05/2021    BILITOT 0.4 05/03/2021    ALKPHOS 123 05/03/2021    AST 18 05/03/2021    ALT 11 05/03/2021       POC Tests:   Recent Labs     05/05/21  0114 05/05/21  0114 05/05/21  0643   POCGLU 148*   < > 76   POCNA 135*  --   --    POCK 4.8  --   --    POCHCT 22.0*  --   --     < > = values in this interval not displayed.        Coags:   Lab Results   Component Value Date    PROTIME 12.1 05/04/2021    INR 1.04 05/04/2021    APTT 35.9 05/03/2021       HCG (If Applicable): No results found for: PREGTESTUR, PREGSERUM, HCG, HCGQUANT     ABGs:   Lab Results   Component Value Date    PHART 7.422 05/05/2021    PO2ART 63.8 05/05/2021    VIH2KLT 36.6 05/05/2021    VFO3TEZ 23.8 05/05/2021    BEART -1 05/05/2021    D7FTLINL 93 05/05/2021

## 2021-05-05 NOTE — PROGRESS NOTES
Occupational Therapy/Physical Therapy  Pt is scheduled for OR today. Please reorder OT/PT post-op.   Thank you  Fernanda Maldonado OT  Estela Garza PT

## 2021-05-05 NOTE — PROGRESS NOTES
CVTS Cardiothoracic Progress Note:                                CC:  Post op follow up     Surgery: 5/3/2021 Urgent coronary bypass grafting surgery x3 with single greater saphenous vein graft to the posterior ventricular branch of the right coronary artery, separate single greater saphenous vein graft to the second obtuse marginal branch of circumflex, pedicled left internal artery to the LAD. Left atrial appendage obliteration with 40 mm AtriCure left atrial  Clip. Cardiopulmonary bypass. Endoscopic vein harvest of the left greater saphenous vein. Transesophageal echo. Epiaortic ultrasound. Doppler verification of grafts. Bilateral five-level intercostal nerve block with Exparel. Platelet gel application. Sternal plating. Hospital course:   5/4/21 sitting up in bed, intermittently confused but easily reoriented. Appears comfortable, remains SR, and 96% on RA  5/5 sitting up in bed, reports feeling uncomfortable in bed. Easily conversational but now with increased O2 requirement 91% on 4 liters of oxygen.      Subjective:   Dietary Intake: NPO   no Nausea   Pain Control: controlled  Complaints: mild post op pain  Bowels: have not moved     Past Medical History:   Diagnosis Date    Atherosclerosis of native artery of right lower extremity with rest pain ([address] 07/25/2017    Back pain     Branch retinal vein occlusion 07/20/2012    Bronchiectasis with acute exacerbation (HCC)     Closed compression fracture of thoracic vertebra ([address] 01/15/2020    Closed fracture of facial bone with routine healing 11/21/2016    Closed jaw fracture ([address] 01/15/2020    Community acquired pneumonia of left lower lobe of lung     Compression fracture of L1 lumbar vertebra ([address] 01/15/2020    COPD (chronic obstructive pulmonary disease) (HCC)     Fracture of tibial plateau, closed, left, initial encounter 12/05/2017    Minimally displaced zone I fracture of sacrum ([address] 09/02/2020    Mucus plugging of bronchi     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 Bone density test last year showed severe osteoporosis. Recently, two broken vertebrae (L1, L2) due to coughing. Diagnosed with tracheomalacia and stated she must cough very hard to clear phlegm. Was coughing due to upper respiratory infections which have been treated. Hx of laminectomy and recent kyphoplasty.    Refractured her jawbone which was previously repaired wi    Post herpetic neuralgia     Proximal humerus fracture 10/01/2019    Rheumatoid arthritis ([address]     Shingles 05/2020    Sleep apnea     Temporal arteritis ([address] 07/10/2013    Tobacco use 10/19/2017    Tracheomalacia     Vitreous hemorrhage, right eye ([address] 02/21/2020        Past Surgical History:   Procedure Laterality Date    ARTERY BIOPSY Right 03/01/2021    at [address]  5/30/13    left temporal artery biopsy    BACK SURGERY  08/2020    BRONCHOSCOPY      BRONCHOSCOPY N/A 6/12/2019    BRONCHOSCOPY ALVEOLAR LAVAGE performed by Stacey Plaza MD at [email or web address] GRAFT N/A 5/3/2021    CORONARY ARTERY BYPASS X3 WITH LEFT ATRIAL APPENDAGE CLIP, 5 LEVEL BILATERAL INTERCOSTAL NERVE BLOCK, STERNAL PLATING performed by Maria R House MD at [address] [facility] ARTHROSCOPY      left    KYPHOSIS SURGERY      LAMINECTOMY      MANDIBLE FRACTURE SURGERY      MANDIBLE FRACTURE SURGERY  02/2020    OTHER SURGICAL HISTORY  01/08/2021    sacral wound debridement    PRESSURE ULCER DEBRIDEMENT N/A 1/8/2021    SACRAL WOUND DEBRIDEMENT performed by Shelley Alejandro MD at [facility]  5/7/2013    FESS with balloon    SPINAL FUSION      TUBAL LIGATION      UPPER GASTROINTESTINAL ENDOSCOPY  4/8/2014 Dilitation        Allergies as of 04/23/2021 - Review Complete 04/23/2021   Allergen Reaction Noted    Atenolol  02/23/2014        Patient Active Problem List   Diagnosis    [facility]    Psoriasis    GERD (gastroesophageal reflux disease)    History of tobacco use    Hyponatremia    Anemia    Cylindrical bronchiectasis ([facility]    Tracheobronchomalacia    Immunocompromised state ([facility]    Hypokalemia    Coronary artery disease    Stasis edema of both lower extremities    Essential hypertension    Lumbar spondylosis    Mitral valve insufficiency and aortic valve insufficiency    Mixed hyperlipidemia    Myopia of both eyes    Osteoporosis    Other chronic sinusitis    Primary open angle glaucoma (POAG) of both eyes, mild stage    Primary osteoarthritis of right hip    Diabetes mellitus ([facility]    Type 2 diabetes mellitus with unspecified diabetic retinopathy without macular edema ([facility])    Uncontrolled type 2 diabetes mellitus with diabetic peripheral angiopathy without gangrene, with long-term current use of insulin ([facility])    Pressure ulcer of coccygeal region, stage 4 ([facility]    NSTEMI (non-ST elevated myocardial infarction) ([facility])    Sacral wound        Vital Signs: /60   Pulse 86   Temp 100 °F (37.8 °C) (Bladder)   Resp 22   Ht 5' 10\" (1.778 m)   Wt 173 lb 8 oz (78.7 kg)   SpO2 91%   BMI 24.89 kg/m²  O2 Flow Rate (L/min): 4 L/min     Admission Weight: Weight: 164 lb 1.6 oz (74.4 kg)    Weight on 5/3 (72.6kg) Pre-op   Weight on 5/4 not measured   5/5  78.7 kg    Intake/Output:     Intake/Output Summary (Last 24 hours) at 5/5/2021 0735  Last data filed at 5/5/2021 0600  Gross per 24 hour   Intake 2042 ml   Output 2775 ml   Net -733 ml      GTTS: insulin  [place] Catheter: CVP 14, CO 5.8, CI 3,     Extubation Time: 5/3/21 @ 12:42   Transition Time:    LABORATORY DATA:   CBC:   Recent Labs     05/03/21  1400 05/03/21  1400 05/04/21  0600 05/04/21  1350 05/05/21  0114 05/05/21  0439   WBC 6.8  --  10.2  --   --  8.8   HGB 9.5*   < > 6.5* 8.8* 7.4* 7.6*   HCT 28.8*   < > 19.4* 26.4*  --  22.2*   MCV 85.6  --  86.2  --   --  89.1   *  --  137  --   --  138    < > = values in this interval not displayed. BMP:   Recent Labs     05/03/21  1334 05/03/21  1334 05/04/21  0014 05/04/21  0450 05/05/21  0439   *  --   --  137 135*   K 4.4   < > 4.8 4.9 4.7     --   --  106 102   CO2 27  --   --  21 24   BUN 21*  --   --  20 19   CREATININE 1.0  --   --  0.9 1.0    < > = values in this interval not displayed. MG:    Recent Labs     05/03/21  1334 05/03/21  2030 05/05/21 0439   MG 2.90* 2.60* 2.30      PT/INR:   Recent Labs     05/03/21  0328 05/03/21  1305 05/04/21  0450   PROTIME 12.3 16.5* 12.1   INR 1.06 1.42* 1.04     APTT:   Recent Labs     05/03/21  0328 05/03/21  1305   APTT 75.8* 35.9     CXR: 5/3/21   Impression   Supportive devices project in normal positions.       No pneumothorax.       Vascular congestion.  Bibasilar atelectasis. CXR: 5/4/21   Impression   1. 14 cm left upper lobe soft tissue mass, possibly reflecting a hematoma. CT of the chest recommended for further evaluation. 2. CHF with mild pulmonary edema and or superimposed pneumonia. CXR: 5/5/21  Impression   Increasing bilateral perihilar airspace disease as compared to prior. Bibasilar pleuroparenchymal disease and pleuroparenchymal opacity at the left   apex are not markedly changed.      ____________________________________________________    Objective:   General appearance: awake, interactive, in NAD  Lungs: diminished L>R  Heart: S1S2 normal; SR on monitor, HR in the 80's  Chest: symmetrical expansion with inspiration and expirations; no rocking of sternum noted   Abdomen: soft, non-tender  Bowel sounds: hypoactive  Kidneys: -720-350= 1965 ml in 24 hrs; Cr 1.0  Wound/Incisions: Midsternal incision with dressing CDI; RLE incisions with dressings CDI; Pacing wires taped and secured  Extremities: BLE pulses palpable; minimal tibial swelling noted   Neurological: awake and alert, was confused overnight  Chest tubes/Drains: Chest tube # 1 with 80-70-60= 210 ml serosanguinous drainage in 24 hours overnight; Chest tube # 2 with 90-50-30= 170 ml serosanguinous drainage in 24 hours overnight; no airleaks noted in either tube. NELSON drain with 80 ml in past 16 hours. Chronic wound vac therapy on sacrum with 350 ml in past 24 hrs. Assessment:   Post-op: 2 days. Condition: In stable but critical condition. Plan:   1. Cardiovascular: s/p CABG- BB, ASA, Statin   AC plan: Will discuss tomorrow with team.      2. Pulmonary: needs expansion- IS, acapella, OOBTC  CXR 5/4 revealed hematoma on left upper chest.   Plan to go to OR this morning for a mediastinal exploration and evacuation of left hemothorax. Patient remains hemodynamically stable. 3. Neurology: analgesia as needed - hx of chronic back pain    4. Nephrology: diurese as tolerated; continue IV lasix 40 mg BID    5. Endocrinology: insulin gtt    6. Hematology: acute blood loss anemia  H&H 6.5 & 19.4 on 5/4,, given 2 units RBC, post transfusion H&H was 838/26. 4. Her H&H this morning is 7.6/22.2. Will order 2 units of RBC. 7. Microbiology: nothing at this time    8. Nutrition: NPO     9. Labs: Reviewed as above    10. Post-op Drains/Wires: keeping all for now. 11. D/C Goals: DCP following, too soon to tell     12. Continue post-op care of patient in the ICU    Meds:    The patient is on a beta-blocker   The patient is not on an ace-i/ARB- 2/2 hypotension   The patient is on a statin   The patient is on oral antiplatelet therapy   ____________________________________________________    Blanquita Hope CNP  5/5/2021  7:35 AM  Note reviewed, events of last 24 hours reviewed along with vital signs and I/Os and patient examined. Assessment and plans discussed and are as outlined above.      Yaima Rodriguez, MD, FACS, [facility] [place], FACCP, [place]

## 2021-05-06 ENCOUNTER — APPOINTMENT (OUTPATIENT)
Dept: GENERAL RADIOLOGY | Age: 71
DRG: 233 | End: 2021-05-06
Attending: INTERNAL MEDICINE
Payer: MEDICARE

## 2021-05-06 LAB
ANION GAP SERPL CALCULATED.3IONS-SCNC: 6 MMOL/L (ref 3–16)
BUN BLDV-MCNC: 15 MG/DL (ref 7–20)
CALCIUM SERPL-MCNC: 7.8 MG/DL (ref 8.3–10.6)
CHLORIDE BLD-SCNC: 102 MMOL/L (ref 99–110)
CO2: 27 MMOL/L (ref 21–32)
CREAT SERPL-MCNC: 0.8 MG/DL (ref 0.6–1.2)
GFR AFRICAN AMERICAN: >60
GFR NON-AFRICAN AMERICAN: >60
GLUCOSE BLD-MCNC: 158 MG/DL (ref 70–99)
GLUCOSE BLD-MCNC: 264 MG/DL (ref 70–99)
GLUCOSE BLD-MCNC: 285 MG/DL (ref 70–99)
HCT VFR BLD CALC: 26.3 % (ref 36–48)
HEMOGLOBIN: 8.9 G/DL (ref 12–16)
MAGNESIUM: 2.2 MG/DL (ref 1.8–2.4)
MCH RBC QN AUTO: 30 PG (ref 26–34)
MCHC RBC AUTO-ENTMCNC: 33.7 G/DL (ref 31–36)
MCV RBC AUTO: 89.2 FL (ref 80–100)
PDW BLD-RTO: 16.3 % (ref 12.4–15.4)
PERFORMED ON: ABNORMAL
PERFORMED ON: ABNORMAL
PLATELET # BLD: 130 K/UL (ref 135–450)
PMV BLD AUTO: 8.3 FL (ref 5–10.5)
POTASSIUM SERPL-SCNC: 4.2 MMOL/L (ref 3.5–5.1)
RBC # BLD: 2.95 M/UL (ref 4–5.2)
SODIUM BLD-SCNC: 135 MMOL/L (ref 136–145)
WBC # BLD: 6.6 K/UL (ref 4–11)

## 2021-05-06 PROCEDURE — 97530 THERAPEUTIC ACTIVITIES: CPT

## 2021-05-06 PROCEDURE — 2500000003 HC RX 250 WO HCPCS: Performed by: THORACIC SURGERY (CARDIOTHORACIC VASCULAR SURGERY)

## 2021-05-06 PROCEDURE — 6370000000 HC RX 637 (ALT 250 FOR IP): Performed by: THORACIC SURGERY (CARDIOTHORACIC VASCULAR SURGERY)

## 2021-05-06 PROCEDURE — 85027 COMPLETE CBC AUTOMATED: CPT

## 2021-05-06 PROCEDURE — 2500000003 HC RX 250 WO HCPCS: Performed by: NURSE PRACTITIONER

## 2021-05-06 PROCEDURE — 2700000000 HC OXYGEN THERAPY PER DAY

## 2021-05-06 PROCEDURE — 71045 X-RAY EXAM CHEST 1 VIEW: CPT

## 2021-05-06 PROCEDURE — 99024 POSTOP FOLLOW-UP VISIT: CPT | Performed by: NURSE PRACTITIONER

## 2021-05-06 PROCEDURE — 6370000000 HC RX 637 (ALT 250 FOR IP): Performed by: NURSE PRACTITIONER

## 2021-05-06 PROCEDURE — 97168 OT RE-EVAL EST PLAN CARE: CPT

## 2021-05-06 PROCEDURE — 97606 NEG PRS WND THER DME>50 SQCM: CPT

## 2021-05-06 PROCEDURE — 83735 ASSAY OF MAGNESIUM: CPT

## 2021-05-06 PROCEDURE — 2580000003 HC RX 258: Performed by: THORACIC SURGERY (CARDIOTHORACIC VASCULAR SURGERY)

## 2021-05-06 PROCEDURE — 94761 N-INVAS EAR/PLS OXIMETRY MLT: CPT

## 2021-05-06 PROCEDURE — 80048 BASIC METABOLIC PNL TOTAL CA: CPT

## 2021-05-06 PROCEDURE — 97110 THERAPEUTIC EXERCISES: CPT

## 2021-05-06 PROCEDURE — 2140000000 HC CCU INTERMEDIATE R&B

## 2021-05-06 PROCEDURE — 6360000002 HC RX W HCPCS: Performed by: THORACIC SURGERY (CARDIOTHORACIC VASCULAR SURGERY)

## 2021-05-06 PROCEDURE — 97164 PT RE-EVAL EST PLAN CARE: CPT

## 2021-05-06 RX ORDER — MAGNESIUM HYDROXIDE 1200 MG/15ML
1000 LIQUID ORAL CONTINUOUS PRN
Status: DISCONTINUED | OUTPATIENT
Start: 2021-05-06 | End: 2021-05-10 | Stop reason: HOSPADM

## 2021-05-06 RX ADMIN — INSULIN LISPRO 3 UNITS: 100 INJECTION, SOLUTION INTRAVENOUS; SUBCUTANEOUS at 16:28

## 2021-05-06 RX ADMIN — ASPIRIN 81 MG: 81 TABLET, COATED ORAL at 14:08

## 2021-05-06 RX ADMIN — Medication 10 ML: at 20:38

## 2021-05-06 RX ADMIN — MAGNESIUM OXIDE TAB 400 MG (241.3 MG ELEMENTAL MG) 400 MG: 400 (241.3 MG) TAB at 08:33

## 2021-05-06 RX ADMIN — ROFLUMILAST 500 MCG: 500 TABLET ORAL at 20:34

## 2021-05-06 RX ADMIN — GABAPENTIN 300 MG: 300 CAPSULE ORAL at 20:30

## 2021-05-06 RX ADMIN — INSULIN LISPRO 3 UNITS: 100 INJECTION, SOLUTION INTRAVENOUS; SUBCUTANEOUS at 20:28

## 2021-05-06 RX ADMIN — MORPHINE SULFATE 15 MG: 15 TABLET, FILM COATED, EXTENDED RELEASE ORAL at 08:33

## 2021-05-06 RX ADMIN — ATORVASTATIN CALCIUM 40 MG: 40 TABLET, FILM COATED ORAL at 20:30

## 2021-05-06 RX ADMIN — MUPIROCIN: 20 OINTMENT TOPICAL at 08:38

## 2021-05-06 RX ADMIN — METOPROLOL TARTRATE 12.5 MG: 25 TABLET, FILM COATED ORAL at 14:08

## 2021-05-06 RX ADMIN — MORPHINE SULFATE 15 MG: 15 TABLET, FILM COATED, EXTENDED RELEASE ORAL at 20:29

## 2021-05-06 RX ADMIN — CEFAZOLIN 2000 MG: 10 INJECTION, POWDER, FOR SOLUTION INTRAVENOUS at 23:47

## 2021-05-06 RX ADMIN — Medication 15 ML: at 12:00

## 2021-05-06 RX ADMIN — CEFAZOLIN 2000 MG: 10 INJECTION, POWDER, FOR SOLUTION INTRAVENOUS at 14:14

## 2021-05-06 RX ADMIN — INSULIN GLARGINE 11 UNITS: 100 INJECTION, SOLUTION SUBCUTANEOUS at 20:27

## 2021-05-06 RX ADMIN — Medication 15 ML: at 20:31

## 2021-05-06 RX ADMIN — POTASSIUM CHLORIDE 10 MEQ: 10 TABLET, EXTENDED RELEASE ORAL at 11:45

## 2021-05-06 RX ADMIN — OXYCODONE 5 MG: 5 TABLET ORAL at 11:45

## 2021-05-06 RX ADMIN — POTASSIUM CHLORIDE 10 MEQ: 10 TABLET, EXTENDED RELEASE ORAL at 18:35

## 2021-05-06 RX ADMIN — FAMOTIDINE 20 MG: 10 INJECTION, SOLUTION INTRAVENOUS at 20:31

## 2021-05-06 RX ADMIN — INSULIN LISPRO 1 UNITS: 100 INJECTION, SOLUTION INTRAVENOUS; SUBCUTANEOUS at 05:51

## 2021-05-06 RX ADMIN — Medication 0.8 MCG/KG/HR: at 00:57

## 2021-05-06 RX ADMIN — INSULIN LISPRO 2 UNITS: 100 INJECTION, SOLUTION INTRAVENOUS; SUBCUTANEOUS at 00:59

## 2021-05-06 RX ADMIN — BISACODYL 5 MG: 5 TABLET, COATED ORAL at 08:33

## 2021-05-06 RX ADMIN — DULOXETINE HYDROCHLORIDE 60 MG: 60 CAPSULE, DELAYED RELEASE ORAL at 08:33

## 2021-05-06 RX ADMIN — POTASSIUM CHLORIDE 10 MEQ: 10 TABLET, EXTENDED RELEASE ORAL at 08:33

## 2021-05-06 RX ADMIN — FAMOTIDINE 20 MG: 10 INJECTION, SOLUTION INTRAVENOUS at 08:33

## 2021-05-06 RX ADMIN — MUPIROCIN: 20 OINTMENT TOPICAL at 20:31

## 2021-05-06 RX ADMIN — POLYETHYLENE GLYCOL 3350 17 G: 17 POWDER, FOR SOLUTION ORAL at 18:36

## 2021-05-06 RX ADMIN — OXYCODONE 5 MG: 5 TABLET ORAL at 16:17

## 2021-05-06 RX ADMIN — GABAPENTIN 300 MG: 300 CAPSULE ORAL at 08:39

## 2021-05-06 RX ADMIN — LATANOPROST 1 DROP: 50 SOLUTION OPHTHALMIC at 20:31

## 2021-05-06 RX ADMIN — METOPROLOL TARTRATE 12.5 MG: 25 TABLET, FILM COATED ORAL at 20:30

## 2021-05-06 RX ADMIN — FUROSEMIDE 40 MG: 10 INJECTION, SOLUTION INTRAMUSCULAR; INTRAVENOUS at 23:47

## 2021-05-06 RX ADMIN — FUROSEMIDE 40 MG: 10 INJECTION, SOLUTION INTRAMUSCULAR; INTRAVENOUS at 14:09

## 2021-05-06 ASSESSMENT — PAIN SCALES - GENERAL
PAINLEVEL_OUTOF10: 7
PAINLEVEL_OUTOF10: 7

## 2021-05-06 ASSESSMENT — PAIN DESCRIPTION - PAIN TYPE
TYPE: CHRONIC PAIN
TYPE: CHRONIC PAIN

## 2021-05-06 ASSESSMENT — ENCOUNTER SYMPTOMS: TACHYPNEA: 1

## 2021-05-06 ASSESSMENT — PAIN DESCRIPTION - ORIENTATION: ORIENTATION: LOWER

## 2021-05-06 ASSESSMENT — PAIN DESCRIPTION - LOCATION: LOCATION: BACK

## 2021-05-06 ASSESSMENT — PAIN DESCRIPTION - DESCRIPTORS: DESCRIPTORS: ACHING;SORE;CONSTANT

## 2021-05-06 NOTE — PROGRESS NOTES
CVTS Cardiothoracic Progress Note:                                CC:  Post op follow up     Surgery: 5/3/2021 Urgent coronary bypass grafting surgery x3 with single greater saphenous vein graft to the posterior ventricular branch of the right coronary artery, separate single greater saphenous vein graft to the second obtuse marginal branch of circumflex, pedicled left internal artery to the LAD. Left atrial appendage obliteration with 40 mm AtriCure left atrial  Clip. Cardiopulmonary bypass. Endoscopic vein harvest of the left greater saphenous vein. Transesophageal echo. Epiaortic ultrasound. Doppler verification of grafts. Bilateral five-level intercostal nerve block with Exparel. Platelet gel application. Sternal plating. Hospital course:   5/4/21 sitting up in bed, intermittently confused but easily reoriented. Appears comfortable, remains SR, and 96% on RA  5/5 sitting up in bed, reports feeling uncomfortable in bed. Easily conversational but now with increased O2 requirement 91% on 4 liters of oxygen. 5/6 was taken back to OR yesterday for mediastinal exploration with evacuation of hematoma. CXR this AM much improved. Pt still with intermittent confusion after having anesthesia again.     Subjective:   Dietary Intake: poor   no Nausea   Pain Control: controlled  Complaints: mild post op pain  Bowels: have not moved     Past Medical History:   Diagnosis Date    Atherosclerosis of native artery of right lower extremity with rest pain ([address] 07/25/2017    Back pain     Branch retinal vein occlusion 07/20/2012    Bronchiectasis with acute exacerbation (HCC)     Closed compression fracture of thoracic vertebra ([address] 01/15/2020    Closed fracture of facial bone with routine healing 11/21/2016    Closed jaw fracture ([address] 01/15/2020    Community acquired pneumonia of left lower lobe of lung     Compression fracture of L1 lumbar vertebra ([address] 01/15/2020    COPD (chronic obstructive pulmonary disease) SURGICAL HISTORY  01/08/2021    sacral wound debridement    PRESSURE ULCER DEBRIDEMENT N/A 1/8/2021    SACRAL WOUND DEBRIDEMENT performed by Anastacio Shah MD at [facility] SEPTOPLASTY  5/7/2013    FESS with balloon    SPINAL FUSION      TUBAL LIGATION      UPPER GASTROINTESTINAL ENDOSCOPY  4/8/2014    Dilitation        Allergies as of 04/23/2021 - Review Complete 04/23/2021   Allergen Reaction Noted    Atenolol  02/23/2014        Patient Active Problem List   Diagnosis    Rheumatoid arthritis ([address]    Psoriasis    GERD (gastroesophageal reflux disease)    History of tobacco use    Hyponatremia    Anemia    Cylindrical bronchiectasis ([address]    Tracheobronchomalacia    Immunocompromised state ([address]    Hypokalemia    Coronary artery disease    Stasis edema of both lower extremities    Essential hypertension    Lumbar spondylosis    Mitral valve insufficiency and aortic valve insufficiency    Mixed hyperlipidemia    Myopia of both eyes    Osteoporosis    Other chronic sinusitis    Primary open angle glaucoma (POAG) of both eyes, mild stage    Primary osteoarthritis of right hip    Diabetes mellitus ([address]    Type 2 diabetes mellitus with unspecified diabetic retinopathy without macular edema ([facility])    Uncontrolled type 2 diabetes mellitus with diabetic peripheral angiopathy without gangrene, with long-term current use of insulin ([facility])    Pressure ulcer of coccygeal region, stage 4 ([address]    NSTEMI (non-ST elevated myocardial infarction) ([facility])    Sacral wound        Vital Signs: BP (!) 105/49   Pulse 82   Temp 99.9 °F (37.7 °C) (Bladder)   Resp 22   Ht 5' 10\" (1.778 m)   Wt 167 lb 8.8 oz (76 kg)   SpO2 100%   BMI 24.04 kg/m²  O2 Flow Rate (L/min): 4 L/min     Admission Weight: Weight: 164 lb 1.6 oz (74.4 kg)    Weight on 5/3 (72.6kg) Pre-op   Weight on 5/4 not measured   5/5  78.7 kg  5/6  76 kg    Intake/Output:     Intake/Output Summary (Last 24 hours) at 5/6/2021 1121  Last data filed at 5/6/2021 0636  Gross per 24 hour   Intake 622 ml   Output 2980 ml   Net -2358 ml      [place]illa Florin Catheter: CVP 14, CO 5.8, CI 3,     Extubation Time: 5/3/21 @ 12:42   Transition Time: 5/5 @ 23:01     LABORATORY DATA:   CBC:   Recent Labs     05/04/21  0600 05/04/21  1350 05/05/21  0114 05/05/21  0439 05/06/21  0525   WBC 10.2  --   --  8.8 6.6   HGB 6.5* 8.8* 7.4* 7.6* 8.9*   HCT 19.4* 26.4*  --  22.2* 26.3*   MCV 86.2  --   --  89.1 89.2     --   --  138 130*     BMP:   Recent Labs     05/04/21  0450 05/05/21  0439 05/06/21  0525    135* 135*   K 4.9 4.7 4.2    102 102   CO2 21 24 27   BUN 20 19 15   CREATININE 0.9 1.0 0.8     MG:    Recent Labs     05/03/21  2030 05/05/21  0439 05/06/21  0525   MG 2.60* 2.30 2.20      PT/INR:   Recent Labs     05/03/21  1305 05/04/21  0450   PROTIME 16.5* 12.1   INR 1.42* 1.04     APTT:   Recent Labs     05/03/21  1305   APTT 35.9     CXR: 5/3/21   Impression   Supportive devices project in normal positions.       No pneumothorax.       Vascular congestion.  Bibasilar atelectasis. CXR: 5/4/21   Impression   1. 14 cm left upper lobe soft tissue mass, possibly reflecting a hematoma. CT of the chest recommended for further evaluation. 2. CHF with mild pulmonary edema and or superimposed pneumonia. CXR: 5/5/21  Impression   Increasing bilateral perihilar airspace disease as compared to prior. Bibasilar pleuroparenchymal disease and pleuroparenchymal opacity at the left   apex are not markedly changed.      ____________________________________________________    Objective:   General appearance: awake, interactive, in NAD  Lungs: diminished   Heart: S1S2 normal; SR on monitor, HR in the 80's  Chest: symmetrical expansion with inspiration and expirations; no rocking of sternum noted   Abdomen: soft, non-tender  Bowel sounds: hypoactive  Kidneys: -0210-344= 2550 ml in 24 hrs; Cr 1.0  Wound/Incisions: Midsternal incision with dressing CDI; RLE incisions with dressings CDI; Pacing wires taped and secured  Extremities: BLE pulses palpable; minimal tibial swelling noted   Neurological: awake and alert, was confused overnight  Chest tubes/Drains: Chest tube # 1 with 40-20-10= 70 ml serosanguinous drainage in 24 hours overnight; Chest tube # 2 with 210-110-50= 370 ml serosanguinous drainage in 24 hours overnight; Chest tube # 3 with 160-10-20= 190 ml serosanguinous drainage in 24 hours overnight; no airleaks noted in any tube. NELSON drain with 0 ml in past 24 hours. Chronic wound vac therapy on sacrum. Assessment:   Post-op: 2 days. Condition: In stable condition. Plan:   1. Cardiovascular: s/p CABG- BB, ASA, Statin   AC plan: Will start Plavix tomorrow. 2. Pulmonary: needs expansion- IS, acapella, OOBTC, ambulation. 3. Neurology: analgesia as needed - hx of chronic back pain    4. Nephrology: diurese as tolerated; continue IV lasix 40 mg BID    5. Endocrinology: Lantus and SSI    6. Hematology: acute blood loss anemia; H&H stable. 7. Microbiology: nothing at this time    8. Nutrition: ADAT     9. Labs: Reviewed as above    10. Post-op Drains/Wires: removing pacing wires this morning. Wound vacuum dressing change today per wound RN - appreciate assistance. 11. D/C Goals: DCP following, too soon to tell     12. Continue post-op care of patient in the ICU    Meds:    The patient is on a beta-blocker   The patient is not on an ace-i/ARB- 2/2 hypotension   The patient is on a statin   The patient is on oral antiplatelet therapy   ____________________________________________________    Cephas Shone, CNP  5/6/2021  11:21 AM  Note reviewed, events of last 24 hours reviewed along with vital signs and I/Os and patient examined. Assessment and plans discussed and are as outlined above.      Edouard Mcelroy MD, FACS, [facility] [place], FACANA, [place]

## 2021-05-06 NOTE — PROGRESS NOTES
Yes  Activity Tolerance: Patient Tolerated treatment well;Patient limited by endurance  Activity Tolerance: VSS during therapy session while on 7L O2:  BP seated in chair = 127/53, HR = 97 bpm, O2 sat = 93%. Patient Diagnosis(es): There were no encounter diagnoses. has a past medical history of Atherosclerosis of native artery of right lower extremity with rest pain ([address] Back pain, Branch retinal vein occlusion, Bronchiectasis with acute exacerbation (HCC), Closed compression fracture of thoracic vertebra ([address] Closed fracture of facial bone with routine healing, Closed jaw fracture ([address] Community acquired pneumonia of left lower lobe of lung, Compression fracture of L1 lumbar vertebra (HCC), COPD (chronic obstructive pulmonary disease) ([address] Fracture of tibial plateau, closed, left, initial encounter, Minimally displaced zone I fracture of sacrum (HCC), Mucus plugging of bronchi, Osteomyelitis of mandible, Osteoporosis with pathological fracture, Post herpetic neuralgia, Proximal humerus fracture, Rheumatoid arthritis ([address] Shingles, Sleep apnea, Temporal arteritis ([address] Tobacco use, Tracheomalacia, and Vitreous hemorrhage, right eye ([address] has a past surgical history that includes hernia repair; Mandible fracture surgery; Foot surgery; Elbow surgery; Cataract removal; Tubal ligation; Knee arthroscopy; Kyphosis surgery; laminectomy; Spinal fusion; Septoplasty (5/7/2013); Artery surgery (5/30/13); Upper gastrointestinal endoscopy (4/8/2014); bronchoscopy; bronchoscopy (N/A, 6/12/2019); Mandible fracture surgery (02/2020); back surgery (08/2020); other surgical history (01/08/2021); Pressure ulcer debridement (N/A, 1/8/2021); Artery Biopsy (Right, 03/01/2021); Coronary artery bypass graft (N/A, 5/3/2021); and Mediastinoscopy (N/A, 5/5/2021).     Restrictions  Restrictions/Precautions  Restrictions/Precautions: Fall Risk, General Precautions, Up as Tolerated, Cardiac  Position Activity Within Normal Limits     Social/Functional History  Social/Functional History  Lives With: Family (granddaughter - at school until 11am, otherwise can stay with pt all day/night)  Type of Home: House  Home Layout: One level  Home Access: Stairs to enter without rails  Entrance Stairs - Number of Steps: 2 stairs, holds onto wall or door jam to enter  Bathroom Shower/Tub: Walk-in shower  Bathroom Toilet: Handicap height  Bathroom Equipment: Built-in shower seat, Grab bars in shower  Home Equipment: [employer], Rolling walker  Receives Help From: Family, Home health(home care nurse for [facility] care)  ADL Assistance: Independent  Homemaking Responsibilities: Yes   Meal Prep Responsibility: Secondary   Laundry Responsibility: No   Cleaning Responsibility: No  Ambulation Assistance: Independent (short distances only (20 feet max with RW), otherwise sits in w/c and propels self with feet)  Transfer Assistance: Independent  Active : No  Mode of Transportation: Family  Occupation: Retired  Leisure & Hobbies: Being outside, spending time with grandchildren    Objective  Observation/Palpation  Posture: Fair    RLE AROM: WFL  LLE AROM : WFL  Strength RLE: Grossly 3+/5 to 4-/5 throughout  Strength LLE: Grossly 3+/5 to 4-/5 throughout    Bed mobility  Rolling to Right: Moderate assistance  Supine to Sit: Unable to assess(pt up in chair upon entry of therapy staff)  Sit to Supine: 2 Person assistance(modA x 2)  Scootin Person assistance(maxA x 2 to scoot up in bed)     Transfers  Sit to Stand: 2 Person Assistance(Rose Marie x 2 from chair to [address] [place] [address])  Stand to sit: Minimal Assistance  Bed to Chair: 2 Person Assistance(Rose Marie x 2 using RW, moving toward L side from chair>bed, cues and assist needed for awareness of multiple tubes/lines)     Ambulation  Ambulation?: No(deferred amb this session 2* presence of multiple tubes/lines and high level of fatigue with transfers)     Balance  Posture: Fair  Sitting - Static: Fair;+  Sitting - Dynamic: Fair  Standing - Static: Fair(using RW)  Standing - Dynamic: Fair;-(using RW)     Exercises  Knee Long Arc Quad: x 10 BLE while seated in chair  Ankle Pumps: x 15 BLE while seated in chair     Plan   Times per week: 5-7x/week in acute care  Times per day: Daily  Specific instructions for Next Treatment: Progress ther ex and mobility as tolerated, assess stair amb prior to D/C (if returning home)  Current Treatment Recommendations: Strengthening, Transfer Training, Endurance Training, Gait Training, Functional Mobility Training, Safety Education & Training, Home Exercise Program, Pain Management, Balance Training, Stair training, Equipment Evaluation, Education, & procurement, Patient/Caregiver Education & Training  Safety Devices: All fall risk precautions in place, Gait belt, Patient at risk for falls, Nurse notified, Call light within reach, Bed alarm in place, Telesitter in use, Left in bed    AM-PAC Score  AM-PAC Inpatient Mobility Raw Score : 14 (05/06/21 1623)  AM-PAC Inpatient T-Scale Score : 38.1 (05/06/21 1623)  Mobility Inpatient CMS 0-100% Score: 61.29 (05/06/21 1623)  Mobility Inpatient CMS G-Code Modifier : CL (05/06/21 1623)    Goals  Short term goals  Time Frame for Short term goals: 1 week (5/13/21) unless otherwise specified  Short term goal 1: Pt will transfer supine <-> sit with Rose Marie x 1  Short term goal 2: Pt will transfer sit <-> stand and bed>chair using RW with supervision  Short term goal 3: Pt will ambulate x 25 feet using RW with supervision (this is the max distance pt walks at baseline)  Short term goal 4: By 5/08/21: Pt will participate in 12-15 reps BLE exercise for ROM/strengthening and endurance  Patient Goals   Patient goals : \"To get up and walk and hopefully improve my walking ability. \"       Therapy Time   Individual Concurrent Group Co-treatment   Time In 2536         Time Out 1515         Minutes 60         Timed Code Treatment Minutes: 50 Minutes Kristal Vidal, PT, DPT #[id number]    If pt is unable to be seen after this session, please let this note serve as discharge summary. Please see case management note for discharge disposition. Thank you.

## 2021-05-06 NOTE — PROGRESS NOTES
MD order to remove pacing wires. Procedure explained to pt. Pulled Atrial wires w/o ectopy. Pulled Ventricular wires w/o ecopy. Dry sterile dsg applied. Heart tones noted and WNL. VS per protocol.  [facility] [place]

## 2021-05-06 NOTE — PROGRESS NOTES
Occupational Therapy   Re-Eval/Treatment  Date: 2021   Patient Name: Bhargavi Moran  MRN: [id number]     : 1950    Date of Service: 2021    Discharge Recommendations:  24 hour supervision or assist, Home with Home health OT       Assessment   Performance deficits / Impairments: Decreased functional mobility ; Decreased ADL status; Decreased ROM; Decreased endurance;Decreased high-level IADLs;Decreased strength;Decreased balance  Patient re-evaluated s/p urgent CABG x 3 5/3/21 and s/p mediastinal exploration and evacuation of hematoma 21. Patient lives with [facility], usually rather IPTA uses RW ~20 ft for mobility otherwise uses w/c in house. Today patient Rose Marie of 2 for transfers using RW  chair to bed, maxA for all ADLs/ After re-evaluation, pt found to be presenting with the above mentioned occupational performance deficits which are affecting participation in daily living skills. Pt would benefit from continued skilled occupational therapy to address ADLs, functional mobility, and safety while in acute care. Co-tx collaboration this date to safely meet goals and will have better occupational performance outcomes with in a co-treatment than 1:1 session. Prognosis: Good  Decision Making: High Complexity  OT Education: OT Role;Plan of Care;Family Education;Equipment;Precautions; ADL Adaptive Strategies;Transfer Training  Patient Education: disease specific: Pt educated on safety with transfers/ADLs following sternal precautions, pt verbalized understanding, will continue to require reinforcement.   REQUIRES OT FOLLOW UP: Yes  Activity Tolerance  Activity Tolerance: Patient Tolerated treatment well;Patient limited by fatigue  Activity Tolerance: Vitals: Bp 127/53, HR 97 bpm, O2 92% on 7L  Safety Devices  Safety Devices in place: Yes  Type of devices: Bed alarm in place;Call light within reach;Nurse notified;Gait belt;Left in bed           Patient Diagnosis(es): There were no encounter monitoring, Kaitlin-sys monitor in room    Subjective   General  Chart Reviewed: Yes  Patient assessed for rehabilitation services?: Yes  Family / Caregiver Present: Yes(daughter)  Referring Practitioner: Jeff Villasenor NP  Diagnosis: NSTEMI; Urgent CABG X 3 5/3/21; hematoma, s/p mediastinal exploration and evacuation of hematoma 5/5/21  Subjective  Subjective: Pt up in chair upon entry, states is ready to get back to bed and take a nap. General Comment  Comments: RN cleared pt for OT; pt resting in bed, agreeable to OT  Patient Currently in Pain: Yes  Pain Assessment  Pain Assessment: 0-10  Pain Level: 7  Pain Type: Chronic pain  Pain Location: Generalized;Back  Pain Orientation: Lower  Pain Descriptors: Aching;Sore;Constant  Pain Frequency: Continuous  Non-Pharmaceutical Pain Intervention(s): Ambulation/Increased Activity; Distraction; Emotional support;Repositioned  Response to Pain Intervention: Patient Satisfied  Pre Treatment Pain Screening  Intervention List: Patient able to continue with treatment  Vital Signs  Patient Currently in Pain: Yes  Social/Functional History  Social/Functional History  Lives With: Family([place]- at school until 11am)  Type of Home: House  Home Layout: One level  Home Access: Stairs to enter without rails  Entrance Stairs - Number of Steps: 2 stairs, holds onto wall or door jam to enter  Bathroom Shower/Tub: Walk-in shower  Bathroom Toilet: Handicap height  Bathroom Equipment: Built-in shower seat, Grab bars in [address]: [employer], Rolling walker  Receives Help From: Family, Home health(home care nurse for [facility])  ADL Assistance: Independent  Homemaking Responsibilities: Yes  Meal Prep Responsibility: Secondary  Laundry Responsibility: No  Cleaning Responsibility: No  Ambulation Assistance: Independent(short distances (20ft with RW) otherwise sits in w/c and propels with feet)  Transfer Assistance: Independent  Active : No  Mode of Transportation: Family  Occupation: Retired  Leisure & Hobbies: being outside, grandchildren       Objective           Observation/Palpation  Posture: Fair  Balance  Sitting Balance: Modified independent (chair level, SBA for EOB)  Standing Balance: Dependent/Total(Rose Marie of 2 with RW for UE support)     ADL  Feeding: Setup  UE Dressing: Moderate assistance  LE Dressing: Dependent/Total  Toileting: Dependent/Total(aleman)     Tone RUE  RUE Tone: Normotonic  Tone LUE  LUE Tone: Normotonic  Coordination  Movements Are Fluid And Coordinated: Yes     Bed mobility  Supine to Sit: Unable to assess(in chair upon entry)  Sit to Supine: 2 Person assistance; Moderate assistance     Transfers  Stand Step Transfers: 2 Person assistance;Minimal assistance(bed to chair to patient R with RW)  Sit to stand: 2 Person assistance;Minimal assistance(up to RW)  Stand to sit: Minimal assistance;2 Person assistance     Cognition  Overall Cognitive Status: Exceptions  Arousal/Alertness: Delayed responses to stimuli  Following Commands: Follows multistep commands with repitition; Follows multistep commands with increased time  Attention Span: Attends with cues to redirect  Memory: Decreased recall of precautions        LUE AROM (degrees)  LUE AROM : WFL  RUE AROM (degrees)  RUE AROM : WFL  LUE Strength  LUE Strength Comment: at least 3+/5 grossly, demostrated by ability to manage walker and feeding tasks  RUE Strength  RUE Strength Comment: at least 3+/5 grossly, demostrated by ability to manage walker and feeding tasks     BUE exercises  shldr flexion to 90* x 5 reps  Elbow flexion/ext x 10 reps  Hand flexion/ext x 10 reps      Plan   Plan  Times per week: 4-6x's a week while in ICU  Current Treatment Recommendations: ROM, Balance Training, Functional Mobility Training, Endurance Training, Self-Care / ADL, Safety Education & Training    AM-PAC Score     AM-PAC Inpatient Daily Activity Raw Score: 14 (05/06/21 0882)  AM-PAC Inpatient ADL T-Scale Score : 33.39 (05/06/21 1637)  ADL Inpatient CMS 0-100% Score: 59.67 (05/06/21 1637)  ADL Inpatient CMS G-Code Modifier : CK (05/06/21 1637)    Goals  Short term goals  Time Frame for Short term goals: 2 week unless otherwise specified 5/20  Short term goal 1: Pt will complete LB dressing with CGA  Short term goal 2: Pt will complete functional transfers with SBA using RW  Short term goal 3: Pt will tolerate B UE ex x 20 reps w/o fatigue to increase strength for ADLs/transfers by 5/15  Short term goal 4: Pt will complete toileting with supervision  Patient Goals   Patient goals : \"get back to my life after surgery\"       Therapy Time   Individual Concurrent Group Co-treatment   Time In 1415         Time Out 1515         Minutes 60         Timed Code Treatment Minutes: 50 Minutes       Vu Sellers OTR/L  If pt is unable to be seen after this session, please let this note serve as discharge summary. Please see case management note for discharge disposition. Thank you.

## 2021-05-06 NOTE — PROGRESS NOTES
MD order to remove Right pleural and mediastinal CT. No air leak No crepitus. Pt instructed on procedure. Site cleansed and prepped per protocol. R pleural and mediastinal CT removed without difficulty. Dry sterile dressing and vaseline gauze applied. Bilateral breath sounds audible. O2Sats 97 % on RA. Incision site within normal limits. Patient tolerated well.  Lindy Maki

## 2021-05-06 NOTE — PROGRESS NOTES
of Water with Meds    Anthropometric Measures:  · Height: 5' 10\" (177.8 cm)  · Current Body Weight: 167 lb (75.8 kg)   · Usual Body Weight: 197 lb (89.4 kg)     · Ideal Body Weight: 150 lbs; % Ideal Body Weight 106.7 %   · BMI: 24  · BMI Categories: Normal Weight (BMI 22.0 to 24.9) age over 72       Nutrition Diagnosis:   · Increased nutrient needs related to inadequate protein-energy intake, increase demand for energy/nutrients as evidenced by poor intake prior to admission, weight loss, wounds    Nutrition Interventions:   Food and/or Nutrient Delivery:  Continue Current Diet, Continue Oral Nutrition Supplement  Nutrition Education/Counseling:  No recommendation at this time   Coordination of Nutrition Care:  Continue to monitor while inpatient, Speech Therapy    Goals:  Pt will consume greater than 50% of meals and ONS this admission       Nutrition Monitoring and Evaluation:   Behavioral-Environmental Outcomes:  None Identified   Food/Nutrient Intake Outcomes:  Diet Advancement/Tolerance, Food and Nutrient Intake, Supplement Intake  Physical Signs/Symptoms Outcomes:  Biochemical Data, Nutrition Focused Physical Findings, Weight     Discharge Planning:    Continue current diet     Electronically signed by Stephen Elkins, MS, RD, LD on 5/6/21 at 12:52 PM EDT    Contact: Office: [phone]; [facility]: [phone]

## 2021-05-06 NOTE — PROGRESS NOTES
[facility] Continence Nurse  Follow-up Progress Note       NAME:  Ave Hashimoto  MEDICAL RECORD NUMBER:  [id number]  AGE:  72 y.o. GENDER:  female  :  1950  TODAY'S DATE:  2021    Subjective: Your name is Karli right? Wound Identification:  Wound Type: Sacrum - Stage 4 Pressure Injury  Contributing Factors: chronic pressure, decreased mobility, shear force and obesity        Patient Goal of Care:  [] Wound Healing  [] Odor Control  [] Palliative Care  [] Pain Control   [] Other:     Objective: Lying in bed; sitter at bedside; 2 chest tubes; F/C    BP (!) 105/49   Pulse 82   Temp 99.9 °F (37.7 °C) (Bladder)   Resp 22   Ht 5' 10\" (1.778 m)   Wt 167 lb 8.8 oz (76 kg)   SpO2 100%   BMI 24.04 kg/m²   Sven Risk Score: Sven Scale Score: 18  Assessment: Sacrum - little change to wound; red/pink granulation tissue   Measurements:  Negative Pressure Wound Therapy Sacrum (Active)   $ Standard NPWT <=50 sq cm PER TX $ Yes 21 1019   $ Standard NPWT >50 sq cm PER TX $ Yes 21 0950   Wound Type Pressure ulcer: Stage IV 21 0950   Unit Type [facility] VFVR [id number] 21 0950   Dressing Type Veraflo 21 0950   Number of pieces used 1 21 0950   Cycle Continuous 21 0950   Target Pressure (mmHg) 125 21 0950   Intensity 5 21 0950   Irrigation Solution Sodium chloride 0.9% 21 0950   Instillation Volume  26 mL 21 0950   Soak Time  6 minutes 21 0950   Vac Frequency 4 hours 21 0950   Canister changed?  No 21 0950   Dressing Status Changed 21 0950   Dressing Changed Changed/New 21 0950   Drainage Amount Moderate 21 0950   Drainage Description Serous 21 0950   Dressing Change Due 05/10/21 05/06/21 0950   Output (ml) 350 ml 21 1019   Wound Assessment Red 21 0950   Blanca-wound Assessment Maceration 2150   Shape oval 2150   Odor None 21 Number of days: 105       Wound 12/18/20 #2, Sacrum, Pressure Injury, Stage 4, Onset 5/2020 (Active)   Wound Image   05/06/21 0952   Wound Etiology Pressure Stage  4 05/06/21 0952   Dressing Status New drainage noted;New dressing applied 05/06/21 0952   Wound Cleansed Cleansed with saline 05/06/21 0952   Dressing/Treatment Negative pressure wound therapy 05/06/21 0952   Dressing Change Due 05/10/21 05/06/21 0952   Wound Length (cm) 4 cm 05/06/21 0952   Wound Width (cm) 2 cm 05/06/21 0952   Wound Depth (cm) 1.5 cm 05/06/21 0952   Wound Surface Area (cm^2) 8 cm^2 05/06/21 0952   Change in Wound Size % (l*w) -5233.33 05/06/21 0952   Wound Volume (cm^3) 12 cm^3 05/06/21 0952   Wound Healing % -19249 05/06/21 0952   Post-Procedure Length (cm) 3.2 cm 04/21/21 1001   Post-Procedure Width (cm) 1 cm 04/21/21 1001   Post-Procedure Depth (cm) 1.7 cm 04/21/21 1001   Post-Procedure Surface Area (cm^2) 3.2 cm^2 04/21/21 1001   Post-Procedure Volume (cm^3) 5.44 cm^3 04/21/21 1001   Distance Tunneling (cm) 0 cm 05/02/21 0945   Tunneling Position ___ O'Clock 0 05/02/21 0945   Undermining Starts ___ O'Clock 6 05/06/21 0952   Undermining Ends___ O'Clock 12 05/06/21 0952   Undermining Maxium Distance (cm) 2.5 05/06/21 0952   Wound Assessment Pink/red;Pale granulation tissue 05/06/21 0952   Drainage Amount Moderate 05/06/21 0952   Drainage Description Serous 05/06/21 0952   Odor None 05/06/21 0952   Blanca-wound Assessment Maceration 05/06/21 0952   Margins Defined edges; Unattached edges 05/06/21 2309   Wound Thickness Description not for Pressure Injury Full thickness 05/06/21 0952   Number of days: 139   Sacrum:      Response to treatment:  Well tolerated by patient.      Pain Assessment:  Severity:  0 / 10  Quality of pain: N/A  Wound Pain Timing/Severity: none  Premedicated: No  Plan:   Plan of Care: Wound 12/18/20 #2, Sacrum, Pressure Injury, Stage 4, Onset 5/2020-Dressing/Treatment: Negative pressure wound therapy   Recommendation: Removed current VAC dressing; cleaned with NS; pat dry; non sting barrier wipe around wound; framed with drape; 1 black foam; covered with drape; attached tracker pad; VeraFlow, NS, 26ml, 3.5 hours, 125 mmHg. Next VAC dressing change to be Monday, May 10.      Specialty Bed Required : Yes   [x] Low Air Loss   [x] Pressure Redistribution  [] Fluid Immersion  [] Bariatric  [] Total Pressure Relief  [] Other:     Current Diet: Diet NPO, After Midnight Exceptions are: Sips of Water with Meds  Dietician consult:  Yes    Discharge Plan:  Placement for patient upon discharge: skilled nursing   Patient appropriate for Outpatient [address]: Yes, currently a pt with Dr. Keyla Araujo at [facility]    Referrals:  [x]   [] [facility]  [] Supplies  [] Other    Patient/Caregiver Teaching:  Level of patient/caregiver understanding able to:   [] Indicates understanding       [] Needs reinforcement  [] Unsuccessful      [] Verbal Understanding  [] Demonstrated understanding       [] No evidence of learning  [] Refused teaching         [] N/A       Electronically signed by Tiara Weems RN, CWOCN on 5/6/2021 at 9:53 AM

## 2021-05-07 LAB
ANION GAP SERPL CALCULATED.3IONS-SCNC: 8 MMOL/L (ref 3–16)
BUN BLDV-MCNC: 12 MG/DL (ref 7–20)
CALCIUM SERPL-MCNC: 7.7 MG/DL (ref 8.3–10.6)
CHLORIDE BLD-SCNC: 95 MMOL/L (ref 99–110)
CO2: 25 MMOL/L (ref 21–32)
CREAT SERPL-MCNC: 0.7 MG/DL (ref 0.6–1.2)
GFR AFRICAN AMERICAN: >60
GFR NON-AFRICAN AMERICAN: >60
GLUCOSE BLD-MCNC: 178 MG/DL (ref 70–99)
GLUCOSE BLD-MCNC: 291 MG/DL (ref 70–99)
GLUCOSE BLD-MCNC: 338 MG/DL (ref 70–99)
HCT VFR BLD CALC: 24.8 % (ref 36–48)
HEMOGLOBIN: 8.3 G/DL (ref 12–16)
MAGNESIUM: 1.8 MG/DL (ref 1.8–2.4)
MCH RBC QN AUTO: 30.1 PG (ref 26–34)
MCHC RBC AUTO-ENTMCNC: 33.5 G/DL (ref 31–36)
MCV RBC AUTO: 89.7 FL (ref 80–100)
PDW BLD-RTO: 16.7 % (ref 12.4–15.4)
PERFORMED ON: ABNORMAL
PERFORMED ON: ABNORMAL
PLATELET # BLD: 149 K/UL (ref 135–450)
PMV BLD AUTO: 8.5 FL (ref 5–10.5)
POTASSIUM SERPL-SCNC: 3.9 MMOL/L (ref 3.5–5.1)
RBC # BLD: 2.76 M/UL (ref 4–5.2)
SODIUM BLD-SCNC: 128 MMOL/L (ref 136–145)
WBC # BLD: 6.6 K/UL (ref 4–11)

## 2021-05-07 PROCEDURE — 85027 COMPLETE CBC AUTOMATED: CPT

## 2021-05-07 PROCEDURE — 83735 ASSAY OF MAGNESIUM: CPT

## 2021-05-07 PROCEDURE — 2580000003 HC RX 258: Performed by: THORACIC SURGERY (CARDIOTHORACIC VASCULAR SURGERY)

## 2021-05-07 PROCEDURE — 80048 BASIC METABOLIC PNL TOTAL CA: CPT

## 2021-05-07 PROCEDURE — 6370000000 HC RX 637 (ALT 250 FOR IP): Performed by: NURSE PRACTITIONER

## 2021-05-07 PROCEDURE — 97110 THERAPEUTIC EXERCISES: CPT

## 2021-05-07 PROCEDURE — 2500000003 HC RX 250 WO HCPCS: Performed by: THORACIC SURGERY (CARDIOTHORACIC VASCULAR SURGERY)

## 2021-05-07 PROCEDURE — 99024 POSTOP FOLLOW-UP VISIT: CPT | Performed by: NURSE PRACTITIONER

## 2021-05-07 PROCEDURE — 97535 SELF CARE MNGMENT TRAINING: CPT

## 2021-05-07 PROCEDURE — 6360000002 HC RX W HCPCS: Performed by: THORACIC SURGERY (CARDIOTHORACIC VASCULAR SURGERY)

## 2021-05-07 PROCEDURE — 94761 N-INVAS EAR/PLS OXIMETRY MLT: CPT

## 2021-05-07 PROCEDURE — 2700000000 HC OXYGEN THERAPY PER DAY

## 2021-05-07 PROCEDURE — 97530 THERAPEUTIC ACTIVITIES: CPT

## 2021-05-07 PROCEDURE — 97116 GAIT TRAINING THERAPY: CPT

## 2021-05-07 PROCEDURE — 6370000000 HC RX 637 (ALT 250 FOR IP): Performed by: THORACIC SURGERY (CARDIOTHORACIC VASCULAR SURGERY)

## 2021-05-07 PROCEDURE — 2140000000 HC CCU INTERMEDIATE R&B

## 2021-05-07 RX ORDER — CLOPIDOGREL BISULFATE 75 MG/1
75 TABLET ORAL DAILY
Status: DISCONTINUED | OUTPATIENT
Start: 2021-05-07 | End: 2021-05-10 | Stop reason: HOSPADM

## 2021-05-07 RX ORDER — INSULIN GLARGINE 100 [IU]/ML
15 INJECTION, SOLUTION SUBCUTANEOUS NIGHTLY
Status: DISCONTINUED | OUTPATIENT
Start: 2021-05-07 | End: 2021-05-10 | Stop reason: HOSPADM

## 2021-05-07 RX ORDER — FUROSEMIDE 10 MG/ML
40 INJECTION INTRAMUSCULAR; INTRAVENOUS 3 TIMES DAILY
Status: COMPLETED | OUTPATIENT
Start: 2021-05-07 | End: 2021-05-07

## 2021-05-07 RX ORDER — BISACODYL 10 MG
20 SUPPOSITORY, RECTAL RECTAL ONCE
Status: DISCONTINUED | OUTPATIENT
Start: 2021-05-07 | End: 2021-05-10 | Stop reason: HOSPADM

## 2021-05-07 RX ORDER — FUROSEMIDE 10 MG/ML
40 INJECTION INTRAMUSCULAR; INTRAVENOUS 2 TIMES DAILY
Status: DISCONTINUED | OUTPATIENT
Start: 2021-05-08 | End: 2021-05-08

## 2021-05-07 RX ADMIN — POLYETHYLENE GLYCOL 3350 17 G: 17 POWDER, FOR SOLUTION ORAL at 11:07

## 2021-05-07 RX ADMIN — Medication 10 ML: at 21:16

## 2021-05-07 RX ADMIN — INSULIN GLARGINE 15 UNITS: 100 INJECTION, SOLUTION SUBCUTANEOUS at 21:27

## 2021-05-07 RX ADMIN — MUPIROCIN: 20 OINTMENT TOPICAL at 10:58

## 2021-05-07 RX ADMIN — MORPHINE SULFATE 15 MG: 15 TABLET, FILM COATED, EXTENDED RELEASE ORAL at 21:15

## 2021-05-07 RX ADMIN — Medication 15 ML: at 11:06

## 2021-05-07 RX ADMIN — FAMOTIDINE 20 MG: 10 INJECTION, SOLUTION INTRAVENOUS at 21:15

## 2021-05-07 RX ADMIN — DULOXETINE HYDROCHLORIDE 60 MG: 60 CAPSULE, DELAYED RELEASE ORAL at 10:52

## 2021-05-07 RX ADMIN — GABAPENTIN 300 MG: 300 CAPSULE ORAL at 10:54

## 2021-05-07 RX ADMIN — INSULIN LISPRO 2 UNITS: 100 INJECTION, SOLUTION INTRAVENOUS; SUBCUTANEOUS at 21:27

## 2021-05-07 RX ADMIN — Medication 10 ML: at 10:59

## 2021-05-07 RX ADMIN — METOPROLOL TARTRATE 12.5 MG: 25 TABLET, FILM COATED ORAL at 21:15

## 2021-05-07 RX ADMIN — ASPIRIN 81 MG: 81 TABLET, COATED ORAL at 10:59

## 2021-05-07 RX ADMIN — CLOPIDOGREL BISULFATE 75 MG: 75 TABLET, FILM COATED ORAL at 17:34

## 2021-05-07 RX ADMIN — FUROSEMIDE 40 MG: 10 INJECTION, SOLUTION INTRAMUSCULAR; INTRAVENOUS at 15:16

## 2021-05-07 RX ADMIN — FAMOTIDINE 20 MG: 10 INJECTION, SOLUTION INTRAVENOUS at 10:53

## 2021-05-07 RX ADMIN — GABAPENTIN 300 MG: 300 CAPSULE ORAL at 21:15

## 2021-05-07 RX ADMIN — MORPHINE SULFATE 15 MG: 15 TABLET, FILM COATED, EXTENDED RELEASE ORAL at 10:54

## 2021-05-07 RX ADMIN — MUPIROCIN: 20 OINTMENT TOPICAL at 21:15

## 2021-05-07 RX ADMIN — INSULIN LISPRO 12 UNITS: 100 INJECTION, SOLUTION INTRAVENOUS; SUBCUTANEOUS at 15:25

## 2021-05-07 RX ADMIN — FUROSEMIDE 40 MG: 10 INJECTION, SOLUTION INTRAMUSCULAR; INTRAVENOUS at 21:15

## 2021-05-07 RX ADMIN — ROFLUMILAST 500 MCG: 500 TABLET ORAL at 21:36

## 2021-05-07 RX ADMIN — POTASSIUM CHLORIDE 10 MEQ: 10 TABLET, EXTENDED RELEASE ORAL at 17:24

## 2021-05-07 RX ADMIN — FUROSEMIDE 40 MG: 10 INJECTION, SOLUTION INTRAMUSCULAR; INTRAVENOUS at 10:54

## 2021-05-07 RX ADMIN — FONDAPARINUX SODIUM 2.5 MG: 2.5 INJECTION, SOLUTION SUBCUTANEOUS at 10:52

## 2021-05-07 RX ADMIN — ATORVASTATIN CALCIUM 40 MG: 40 TABLET, FILM COATED ORAL at 21:16

## 2021-05-07 RX ADMIN — OXYCODONE 10 MG: 5 TABLET ORAL at 15:11

## 2021-05-07 RX ADMIN — METOPROLOL TARTRATE 12.5 MG: 25 TABLET, FILM COATED ORAL at 10:53

## 2021-05-07 RX ADMIN — Medication 15 ML: at 21:15

## 2021-05-07 RX ADMIN — POTASSIUM CHLORIDE 10 MEQ: 10 TABLET, EXTENDED RELEASE ORAL at 10:52

## 2021-05-07 RX ADMIN — LATANOPROST 1 DROP: 50 SOLUTION OPHTHALMIC at 21:34

## 2021-05-07 RX ADMIN — POTASSIUM CHLORIDE 10 MEQ: 10 TABLET, EXTENDED RELEASE ORAL at 12:15

## 2021-05-07 RX ADMIN — MAGNESIUM OXIDE TAB 400 MG (241.3 MG ELEMENTAL MG) 400 MG: 400 (241.3 MG) TAB at 10:52

## 2021-05-07 ASSESSMENT — PAIN DESCRIPTION - PAIN TYPE
TYPE: SURGICAL PAIN;ACUTE PAIN
TYPE: SURGICAL PAIN

## 2021-05-07 ASSESSMENT — PAIN SCALES - GENERAL: PAINLEVEL_OUTOF10: 10

## 2021-05-07 NOTE — PROGRESS NOTES
Physical Therapy  Facility/Department: [facility] C2 CARD TELEMETRY  Daily Treatment Note  NAME: Christina Colon  : 1950  MRN: [id number]    Date of Service: 2021    Discharge Recommendations:  Continue to assess pending progress(likely SNF)   PT Equipment Recommendations  Equipment Needed: (defer)    Assessment   Body structures, Functions, Activity limitations: Decreased functional mobility ; Decreased endurance;Decreased strength;Decreased balance;Decreased posture; Increased pain  Assessment: Pt required mod A for bed mobility and mod A x2 for transfers and gait with 4WW. Pt ambulated 20 ft x 2 with seated rest break in between, with obvious fatigue, but vital signs remained stable on RA. Pt was co tx with OT due to level of assistance needed to maximize functional mobility in the safest manner possible. Treatment Diagnosis: Decreased endurance and impaired mobility s/p CABG x 3 surgery  Specific instructions for Next Treatment: Progress ther ex and mobility as tolerated, assess stair amb prior to D/C (if returning home)  Prognosis: Good  Decision Making: High Complexity  PT Education: Disease Specific Education;Goals;Transfer Training;PT Role;Energy Conservation; Functional Mobility Training;Plan of Care;General Safety;Precautions; Family Education;Equipment  Patient Education: Disease-specific education: Pt educated in sternal precautions and safety in bed mobility/transfers s/p CABG surgery. Pt voices understanding. Barriers to Learning: none  REQUIRES PT FOLLOW UP: Yes  Activity Tolerance  Activity Tolerance: Patient Tolerated treatment well;Patient limited by endurance  Activity Tolerance: VSS during therapy session while on   BP seated in chair = 131/61, HR = 79 bpm, O2 sat = 95% on RA     Patient Diagnosis(es): There were no encounter diagnoses.      has a past medical history of Atherosclerosis of native artery of right lower extremity with rest pain ([address] Back pain, Branch retinal vein occlusion, Bronchiectasis with acute exacerbation (HCC), Closed compression fracture of thoracic vertebra ([address] Closed fracture of facial bone with routine healing, Closed jaw fracture ([address] Community acquired pneumonia of left lower lobe of lung, Compression fracture of L1 lumbar vertebra ([address] COPD (chronic obstructive pulmonary disease) ([address] Fracture of tibial plateau, closed, left, initial encounter, Minimally displaced zone I fracture of sacrum ([address] Mucus plugging of bronchi, Osteomyelitis of mandible, Osteoporosis with pathological fracture, Post herpetic neuralgia, Proximal humerus fracture, Rheumatoid arthritis ([address] Shingles, Sleep apnea, Temporal arteritis ([address] Tobacco use, Tracheomalacia, and Vitreous hemorrhage, right eye ([address] has a past surgical history that includes hernia repair; Mandible fracture surgery; Foot surgery; Elbow surgery; Cataract removal; Tubal ligation; Knee arthroscopy; Kyphosis surgery; laminectomy; Spinal fusion; Septoplasty (5/7/2013); Artery surgery (5/30/13); Upper gastrointestinal endoscopy (4/8/2014); bronchoscopy; bronchoscopy (N/A, 6/12/2019); Mandible fracture surgery (02/2020); back surgery (08/2020); other surgical history (01/08/2021); Pressure ulcer debridement (N/A, 1/8/2021); Artery Biopsy (Right, 03/01/2021); Coronary artery bypass graft (N/A, 5/3/2021); and Mediastinoscopy (N/A, 5/5/2021). Restrictions  Restrictions/Precautions  Restrictions/Precautions: Fall Risk, General Precautions, Up as Tolerated, Cardiac  Position Activity Restriction  Sternal Precautions: No Pushing, No Pulling, 5# Lifting Restrictions  Other position/activity restrictions: High fall risk per nursing assessment, wound vac, Stage IV sacral wound, Jaycee aleman  Subjective   General  Chart Reviewed:  Yes  Additional Pertinent Hx: s/p urgent CABG x 3 on 5/03/21, s/p mediastinal exploration/evacuation of hematoma on 5/05/21, stage IV pressure ulcer on sacrum with home wound VAC prior to Score  -[facility] Inpatient Mobility Raw Score : 10 (05/07/21 1647)  AM-PAC Inpatient T-Scale Score : 32.29 (05/07/21 1647)  Mobility Inpatient CMS 0-100% Score: 76.75 (05/07/21 1647)  Mobility Inpatient CMS G-Code Modifier : CL (05/07/21 1647)          Goals  Short term goals  Time Frame for Short term goals: 1 week (5/13/21) unless otherwise specified  Short term goal 1: Pt will transfer supine <-> sit with Rose Marie x 1; 5/7 mod A  Short term goal 2: Pt will transfer sit <-> stand and bed>chair using RW with supervision; mod A x2 with 6NK on 5/7  Short term goal 3: Pt will ambulate x 25 feet using RW with supervision (this is the max distance pt walks at baseline); 20 ft x 2 with mod A x2 with 2GJ  Short term goal 4: By 5/08/21: Pt will participate in 12-15 reps BLE exercise for ROM/strengthening and endurance; 5/7 progressing  Patient Goals   Patient goals : \"To get up and walk and hopefully improve my walking ability. \"    Plan    Plan  Times per week: 5-7x/week in acute care  Times per day: Daily  Specific instructions for Next Treatment: Progress ther ex and mobility as tolerated, assess stair amb prior to D/C (if returning home)  Current Treatment Recommendations: Strengthening, Transfer Training, Endurance Training, Gait Training, Functional Mobility Training, Safety Education & Training, Home Exercise Program, Pain Management, Balance Training, Stair training, Equipment Evaluation, Education, & procurement, Patient/Caregiver Education & Training  Safety Devices  Type of devices: All fall risk precautions in place, Gait belt, Patient at risk for falls, Nurse notified, Call light within reach, Telesitter in use, Left in chair, Chair alarm in place     Therapy Time   Individual Concurrent Group Co-treatment   Time In       1331   Time Out       1419   Minutes       48   Timed Code Treatment Minutes: 50 Minutes    If pt is discharged prior to next therapy session, this note will serve as discharge summary.     Navin Mcmahon Aneudy Anthony, PT

## 2021-05-07 NOTE — PROGRESS NOTES
CVTS Cardiothoracic Progress Note:                                CC:  Post op follow up     Surgery: 5/3/2021 Urgent coronary bypass grafting surgery x3 with single greater saphenous vein graft to the posterior ventricular branch of the right coronary artery, separate single greater saphenous vein graft to the second obtuse marginal branch of circumflex, pedicled left internal artery to the LAD. Left atrial appendage obliteration with 40 mm AtriCure left atrial  Clip. Cardiopulmonary bypass. Endoscopic vein harvest of the left greater saphenous vein. Transesophageal echo. Epiaortic ultrasound. Doppler verification of grafts. Bilateral five-level intercostal nerve block with Exparel. Platelet gel application. Sternal plating. Hospital course:   5/4/21 sitting up in bed, intermittently confused but easily reoriented. Appears comfortable, remains SR, and 96% on RA  5/5 sitting up in bed, reports feeling uncomfortable in bed. Easily conversational but now with increased O2 requirement 91% on 4 liters of oxygen. 5/6 was taken back to OR yesterday for mediastinal exploration with evacuation of hematoma. CXR this AM much improved. Pt still with intermittent confusion after having anesthesia again. 5/7 sitting up in bed eating breakfast. A&O reports that she is feeling better.      Subjective:   Dietary Intake: poor   no Nausea   Pain Control: controlled  Complaints: mild post op pain  Bowels: have not moved +flatus     Past Medical History:   Diagnosis Date    Atherosclerosis of native artery of right lower extremity with rest pain ([address] 07/25/2017    Back pain     Branch retinal vein occlusion 07/20/2012    Bronchiectasis with acute exacerbation ([address]     Closed compression fracture of thoracic vertebra ([address] 01/15/2020    Closed fracture of facial bone with routine healing 11/21/2016    Closed jaw fracture ([address] 01/15/2020    Community acquired pneumonia of left lower lobe of lung     Compression fracture of L1 lumbar vertebra ([address] 01/15/2020    COPD (chronic obstructive pulmonary disease) (HCC)     Fracture of tibial plateau, closed, left, initial encounter 12/05/2017    Minimally displaced zone I fracture of sacrum (HCC) 09/02/2020    Mucus plugging of bronchi     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 Bone density test last year showed severe osteoporosis. Recently, two broken vertebrae (L1, L2) due to coughing. Diagnosed with tracheomalacia and stated she must cough very hard to clear phlegm. Was coughing due to upper respiratory infections which have been treated. Hx of laminectomy and recent kyphoplasty.    Refractured her jawbone which was previously repaired wi    Post herpetic neuralgia     Proximal humerus fracture 10/01/2019    Rheumatoid arthritis ([address]     Shingles 05/2020    Sleep apnea     Temporal arteritis ([address] 07/10/2013    Tobacco use 10/19/2017    Tracheomalacia     Vitreous hemorrhage, right eye ([address] 02/21/2020        Past Surgical History:   Procedure Laterality Date    ARTERY BIOPSY Right 03/01/2021    at [address]  5/30/13    left temporal artery biopsy    BACK SURGERY  08/2020    BRONCHOSCOPY      BRONCHOSCOPY N/A 6/12/2019    BRONCHOSCOPY ALVEOLAR LAVAGE performed by Pancho Orantes MD at [address] GRAFT N/A 5/3/2021    CORONARY ARTERY BYPASS X3 WITH LEFT ATRIAL APPENDAGE CLIP, 5 LEVEL BILATERAL INTERCOSTAL NERVE BLOCK, STERNAL PLATING performed by Caitlin Vazquez MD at [address] [facility] ARTHROSCOPY      left    KYPHOSIS SURGERY      LAMINECTOMY      MANDIBLE FRACTURE SURGERY      MANDIBLE FRACTURE SURGERY  02/2020    MEDIASTINOSCOPY N/A 5/5/2021    MEDIASTINAL EXPLORATION AND EVACUATION OF HEMATOMA performed by Jazmyne Carr MD at [address]  01/08/2021    sacral wound debridement    PRESSURE ULCER DEBRIDEMENT N/A 1/8/2021    SACRAL WOUND DEBRIDEMENT performed by Karyle Bridgeman, MD at [address] SEPTOPLASTY  5/7/2013    FESS with balloon    SPINAL FUSION      TUBAL LIGATION      UPPER GASTROINTESTINAL ENDOSCOPY  4/8/2014    Dilitation        Allergies as of 04/23/2021 - Review Complete 04/23/2021   Allergen Reaction Noted    Atenolol  02/23/2014        Patient Active Problem List   Diagnosis    Rheumatoid arthritis ([address]    Psoriasis    GERD (gastroesophageal reflux disease)    History of tobacco use    Hyponatremia    Anemia    Cylindrical bronchiectasis ([address]    Tracheobronchomalacia    Immunocompromised state ([address]    Hypokalemia    Coronary artery disease    Stasis edema of both lower extremities    Essential hypertension    Lumbar spondylosis    Mitral valve insufficiency and aortic valve insufficiency    Mixed hyperlipidemia    Myopia of both eyes    Osteoporosis    Other chronic sinusitis    Primary open angle glaucoma (POAG) of both eyes, mild stage    Primary osteoarthritis of right hip    Diabetes mellitus ([address]    Type 2 diabetes mellitus with unspecified diabetic retinopathy without macular edema ([address]    Uncontrolled type 2 diabetes mellitus with diabetic peripheral angiopathy without gangrene, with long-term current use of insulin ([facility])    Pressure ulcer of coccygeal region, stage 4 ([address]    NSTEMI (non-ST elevated myocardial infarction) ([facility])    Sacral wound        Vital Signs: BP (!) 109/46   Pulse 85   Temp 100 °F (37.8 °C) (Bladder)   Resp 16   Ht 5' 10\" (1.778 m)   Wt 164 lb 14.5 oz (74.8 kg)   SpO2 98%   BMI 23.66 kg/m²  O2 Flow Rate (L/min): 4 L/min     Admission Weight: Weight: 164 lb 1.6 oz (74.4 kg)    Weight on 5/3 (72.6kg) Pre-op   Weight on 5/4 not measured   5/5  78.7 kg  5/6  76 kg  5/7  74.8 kg    Intake/Output:     Intake/Output Summary (Last 24 hours) at 5/7/2021 0917  Last data filed at 5/7/2021 0600  Gross per 24 hour   Intake 497 ml   Output 3315 ml   Net -2818 ml      Elvis Verdugo Catheter: CVP 14, CO 5.8, CI 3,     Extubation Time: 5/3/21 @ 12:42   Transition Time: 5/5 @ 23:01     LABORATORY DATA:   CBC:   Recent Labs     05/05/21  0439 05/06/21  0525 05/07/21  0553   WBC 8.8 6.6 6.6   HGB 7.6* 8.9* 8.3*   HCT 22.2* 26.3* 24.8*   MCV 89.1 89.2 89.7    130* 149     BMP:   Recent Labs     05/05/21  0439 05/06/21  0525 05/07/21  0553   * 135* 128*   K 4.7 4.2 3.9    102 95*   CO2 24 27 25   BUN 19 15 12   CREATININE 1.0 0.8 0.7     MG:    Recent Labs     05/05/21  0439 05/06/21  0525 05/07/21  0553   MG 2.30 2.20 1.80      CXR: 5/3/21   Impression   Supportive devices project in normal positions.       No pneumothorax.       Vascular congestion.  Bibasilar atelectasis. CXR: 5/4/21   Impression   1. 14 cm left upper lobe soft tissue mass, possibly reflecting a hematoma. CT of the chest recommended for further evaluation. 2. CHF with mild pulmonary edema and or superimposed pneumonia. CXR: 5/5/21  Impression   Increasing bilateral perihilar airspace disease as compared to prior. Bibasilar pleuroparenchymal disease and pleuroparenchymal opacity at the left   apex are not markedly changed. ____________________________________________________    Objective:   General appearance: awake, interactive, in NAD  Lungs: diminished   Heart: S1S2 normal; SR on monitor, HR in the 80's  Chest: symmetrical expansion with inspiration and expirations; no rocking of sternum noted   Abdomen: soft, non-tender  Bowel sounds: normoactive  Kidneys: -5165-791= 2550 ml in 24 hrs; Cr 1.0  Wound/Incisions: Midsternal incision with dressing CDI; RLE incisions with dressings CDI;  Pacing wires taped and secured  Extremities: BLE pulses palpable; minimal tibial swelling noted   Neurological: awake and alert, was confused overnight  Chest tubes/Drains: Chest tubes #1 & #3 removed 5/6. Chest tube # 2 with 50-0-0= 50 ml serosanguinous drainage in 24 hours overnight; no airleak noted. Chronic wound vac therapy on sacrum. Assessment:   Post-op: 4 days. Condition: In stable condition. Plan:   1. Cardiovascular: s/p CABG- BB, ASA, Statin   AC plan: Starting Plavix today. 2. Pulmonary: needs expansion- IS, acapella, OOBTC, ambulation. 3. Neurology: analgesia as needed - hx of chronic back pain    4. Nephrology: diurese as tolerated; continue IV lasix 40 mg TID    5. Endocrinology: BG needs tighter control. She takes 20-30 units of Lantus nightly at home. Will increase Lantus and continue SSI    6. Hematology: acute blood loss anemia; H&H stable. 7. Microbiology: nothing at this time    8. Nutrition: cardiac diet  Constipation: Pt feels like she may be able to have a BM, RN called to assist to bathroom. If she doesn't have one by later today she will get a suppository. 9. Labs: Reviewed as above    10. Post-op Drains/Wires: removing NELSON this morning and her chest tube after she gets up (given that she doesn't have a large amount of output after ambulating). Wound vacuum dressing changed 5/6 per wound RN - appreciate assistance. 11. D/C Goals: DCP following, likely home with family and [facility] in next couple of days. 12. Continue post-op care of patient in the ICU    Meds:    The patient is on a beta-blocker   The patient is not on an ace-i/ARB- 2/2 hypotension   The patient is on a statin   The patient is on oral antiplatelet therapy   ____________________________________________________    Naomie Thornton CNP  5/7/2021  9:17 AM  Note reviewed, events of last 24 hours reviewed along with vital signs and I/Os and patient examined. Assessment and plans discussed and are as outlined above.      Talisha Piper MD, FACS, [address], FACCP, [place]

## 2021-05-07 NOTE — FLOWSHEET NOTE
05/07/21 1453   Encounter Summary   Services provided to: Patient   Referral/Consult From: [facility] Family members   Continue Visiting   (5/7 follow up, communion, sppt, and prayer)   Complexity of Encounter Moderate   Length of Encounter 15 minutes   Spiritual/Denominational   Type Spiritual support   Assessment Calm; Approachable;Coping   Intervention Active listening;Explored feelings, thoughts, concerns;Prayer;Communion   Outcome Comfort;Expressed gratitude;Engaged in conversation;Expressed feelings/needs/concerns   Sacraments   Communion Patient received communion

## 2021-05-07 NOTE — PROGRESS NOTES
Occupational Therapy  Facility/Department: [facility] C2 CARD TELEMETRY  Daily Treatment Note  NAME: Willie Barnhart  : 1950  MRN: [id number]    Date of Service: 2021    Discharge Recommendations:  24 hour supervision or assist, Home with Home health OT     Assessment   Performance deficits / Impairments: Decreased functional mobility ; Decreased ADL status; Decreased ROM; Decreased endurance;Decreased high-level IADLs;Decreased strength;Decreased balance  Assessment: Pt required mod x2 for standing balance and functional transfers with 4WW, instruction provided on upright posture, sternal precautions and safe walker use. Pt requires ongoing instruction of sternal precautions and ADL skills. Cont skilled OT in acute care. Cotx performed with PT to safely address ADLs and mobility due to extent of pt's deficits. Prognosis: Good  OT Education: OT Role;Plan of Care;Family Education;Equipment;Precautions; ADL Adaptive Strategies;Transfer Training  Patient Education: disease specific: Pt educated on safety with transfers/ADLs following sternal precautions, pt verbalized understanding, will continue to require reinforcement. REQUIRES OT FOLLOW UP: Yes  Activity Tolerance  Activity Tolerance: Patient Tolerated treatment well  Activity Tolerance: /44, HR 89, O2 sats 98% RA  Safety Devices  Safety Devices in place: Yes  Type of devices: Call light within reach;Nurse notified;Gait belt;Left in chair       Patient Diagnosis(es): There were no encounter diagnoses.       has a past medical history of Atherosclerosis of native artery of right lower extremity with rest pain ([address] Back pain, Branch retinal vein occlusion, Bronchiectasis with acute exacerbation (HCC), Closed compression fracture of thoracic vertebra ([address] Closed fracture of facial bone with routine healing, Closed jaw fracture ([address] Community acquired pneumonia of left lower lobe of lung, Compression fracture of L1 lumbar vertebra ([address] COPD (chronic obstructive pulmonary disease) [facility] Fracture of tibial plateau, closed, left, initial encounter, Minimally displaced zone I fracture of sacrum (HCC), Mucus plugging of bronchi, Osteomyelitis of mandible, Osteoporosis with pathological fracture, Post herpetic neuralgia, Proximal humerus fracture, Rheumatoid arthritis ([address] Shingles, Sleep apnea, Temporal arteritis ([address] Tobacco use, Tracheomalacia, and Vitreous hemorrhage, right eye ([address] has a past surgical history that includes hernia repair; Mandible fracture surgery; Foot surgery; Elbow surgery; Cataract removal; Tubal ligation; Knee arthroscopy; Kyphosis surgery; laminectomy; Spinal fusion; Septoplasty (5/7/2013); Artery surgery (5/30/13); Upper gastrointestinal endoscopy (4/8/2014); bronchoscopy; bronchoscopy (N/A, 6/12/2019); Mandible fracture surgery (02/2020); back surgery (08/2020); other surgical history (01/08/2021); Pressure ulcer debridement (N/A, 1/8/2021); Artery Biopsy (Right, 03/01/2021); Coronary artery bypass graft (N/A, 5/3/2021); and Mediastinoscopy (N/A, 5/5/2021). Restrictions  Restrictions/Precautions  Restrictions/Precautions: Fall Risk, General Precautions, Up as Tolerated, Cardiac  Position Activity Restriction  Sternal Precautions: No Pushing, No Pulling, 5# Lifting Restrictions  Other position/activity restrictions: High fall risk per nursing assessment, wound vac, Stage IV sacral wound, Jaycee aleman  Subjective   General  Chart Reviewed: Yes  Patient assessed for rehabilitation services?: Yes  Family / Caregiver Present: Yes(granddaughter)  Referring Practitioner: Author Mitul NP  Diagnosis: NSTEMI; Urgent CABG X 3 5/3/21; hematoma, s/p mediastinal exploration and evacuation of hematoma 5/5/21  Subjective  Subjective: Pt agreeable to OT  General Comment  Comments: RN approved therapy  Pain Assessment  Pain Assessment: 0-10  Pain Level: 10  Pain Type: Surgical pain  Pain Location: Back; Chest  Non-Pharmaceutical Pain Intervention(s): Ambulation/Increased Activity;Repositioned; Therapeutic presence  Response to Pain Intervention: Patient Satisfied  Pre Treatment Pain Screening  Intervention List: Nurse/Physician notified  Vital Signs  Patient Currently in Pain: Yes   Orientation  Orientation  Overall Orientation Status: Within Functional Limits  Objective    ADL  Feeding: Setup; Beverage management  UE Dressing: Moderate assistance(gown)  LE Dressing: Dependent/Total(socks)  Toileting: Dependent/Total(aleman)  Additional Comments: vc's for sternal precautions     Balance  Sitting Balance: Supervision  Standing Balance: Dependent/Total(mod x2 with 8KE)  Functional Mobility  Functional - Mobility Device: 4-Wheeled Walker  Activity: Other(in room (to door and chair))  Assist Level: Dependent/Total(mod x2)  Functional Mobility Comments: vc's for safe walker use and upright posture  Bed mobility  Supine to Sit: Moderate assistance  Sit to Supine: Unable to assess  Transfers  Stand Pivot Transfers: Moderate assistance;2 Person assistance(4WW)  Sit to stand: Moderate assistance;2 Person assistance  Stand to sit: Moderate assistance;2 Person assistance         Cognition  Overall Cognitive Status: Exceptions  Arousal/Alertness: Delayed responses to stimuli  Following Commands: Follows multistep commands with repitition; Follows multistep commands with increased time  Attention Span: Attends with cues to redirect  Memory: Decreased recall of precautions  Safety Judgement: Decreased awareness of need for safety  Problem Solving: Assistance required to correct errors made;Assistance required to identify errors made  Initiation: Requires cues for some  Sequencing: Requires cues for some         Type of ROM/Therapeutic Exercise  Type of ROM/Therapeutic Exercise: AROM  Comment: BUE  Exercises  Elbow Flexion: 10x  Elbow Extension: 10x  Supination: 10x  Pronation: 10x  Wrist Flexion: 10x  Wrist Extension: 10x  Grasp/Release: 10x      Plan Plan  Times per week: 4-6x's a week while in ICU  Current Treatment Recommendations: ROM, Balance Training, Functional Mobility Training, Endurance Training, Self-Care / ADL, Safety Education & Training    AM-PAC Score      AM-PAC Inpatient Daily Activity Raw Score: 14 (05/07/21 1453)  AM-PAC Inpatient ADL T-Scale Score : 33.39 (05/07/21 1453)  ADL Inpatient CMS 0-100% Score: 59.67 (05/07/21 1453)  ADL Inpatient CMS G-Code Modifier : CK (05/07/21 1453)    Goals  Short term goals  Time Frame for Short term goals: 2 week unless otherwise specified 5/20  Short term goal 1: Pt will complete LB dressing with CGA-ongoing, total assist 5/7  Short term goal 2: Pt will complete functional transfers with SBA using RW-ongoing, mod x2 with 4WW 5/7  Short term goal 3: Pt will tolerate B UE ex x 20 reps w/o fatigue to increase strength for ADLs/transfers by 5/15-ongoing, 10x 5/7  Short term goal 4: Pt will complete toileting with supervision-ongoing, aleman 5/7  Patient Goals   Patient goals : \"get back to my life after surgery\"     Therapy Time   Individual Concurrent Group Co-treatment   Time In       1331   Time Out       1418   Minutes       47   Timed Code Treatment Minutes: 52 Minutes    If pt is discharged prior to next OT session, this note will serve as the discharge summary.   Layton Wesley OT

## 2021-05-08 LAB
ANION GAP SERPL CALCULATED.3IONS-SCNC: 10 MMOL/L (ref 3–16)
BUN BLDV-MCNC: 15 MG/DL (ref 7–20)
CALCIUM SERPL-MCNC: 8.1 MG/DL (ref 8.3–10.6)
CHLORIDE BLD-SCNC: 96 MMOL/L (ref 99–110)
CO2: 28 MMOL/L (ref 21–32)
CREAT SERPL-MCNC: 0.8 MG/DL (ref 0.6–1.2)
GFR AFRICAN AMERICAN: >60
GFR NON-AFRICAN AMERICAN: >60
GLUCOSE BLD-MCNC: 110 MG/DL (ref 70–99)
GLUCOSE BLD-MCNC: 117 MG/DL (ref 70–99)
GLUCOSE BLD-MCNC: 191 MG/DL (ref 70–99)
GLUCOSE BLD-MCNC: 234 MG/DL (ref 70–99)
GLUCOSE BLD-MCNC: 78 MG/DL (ref 70–99)
HCT VFR BLD CALC: 26.4 % (ref 36–48)
HEMOGLOBIN: 8.9 G/DL (ref 12–16)
MAGNESIUM: 1.8 MG/DL (ref 1.8–2.4)
MCH RBC QN AUTO: 30.3 PG (ref 26–34)
MCHC RBC AUTO-ENTMCNC: 33.9 G/DL (ref 31–36)
MCV RBC AUTO: 89.4 FL (ref 80–100)
PDW BLD-RTO: 16.5 % (ref 12.4–15.4)
PERFORMED ON: ABNORMAL
PERFORMED ON: NORMAL
PLATELET # BLD: 197 K/UL (ref 135–450)
PMV BLD AUTO: 7.8 FL (ref 5–10.5)
POTASSIUM SERPL-SCNC: 3.9 MMOL/L (ref 3.5–5.1)
RBC # BLD: 2.96 M/UL (ref 4–5.2)
SODIUM BLD-SCNC: 134 MMOL/L (ref 136–145)
WBC # BLD: 6.3 K/UL (ref 4–11)

## 2021-05-08 PROCEDURE — 97110 THERAPEUTIC EXERCISES: CPT

## 2021-05-08 PROCEDURE — 6370000000 HC RX 637 (ALT 250 FOR IP): Performed by: THORACIC SURGERY (CARDIOTHORACIC VASCULAR SURGERY)

## 2021-05-08 PROCEDURE — 6360000002 HC RX W HCPCS: Performed by: THORACIC SURGERY (CARDIOTHORACIC VASCULAR SURGERY)

## 2021-05-08 PROCEDURE — 36592 COLLECT BLOOD FROM PICC: CPT

## 2021-05-08 PROCEDURE — 2580000003 HC RX 258: Performed by: THORACIC SURGERY (CARDIOTHORACIC VASCULAR SURGERY)

## 2021-05-08 PROCEDURE — 85027 COMPLETE CBC AUTOMATED: CPT

## 2021-05-08 PROCEDURE — 97116 GAIT TRAINING THERAPY: CPT

## 2021-05-08 PROCEDURE — 99024 POSTOP FOLLOW-UP VISIT: CPT | Performed by: THORACIC SURGERY (CARDIOTHORACIC VASCULAR SURGERY)

## 2021-05-08 PROCEDURE — 97530 THERAPEUTIC ACTIVITIES: CPT

## 2021-05-08 PROCEDURE — 80048 BASIC METABOLIC PNL TOTAL CA: CPT

## 2021-05-08 PROCEDURE — 6370000000 HC RX 637 (ALT 250 FOR IP): Performed by: NURSE PRACTITIONER

## 2021-05-08 PROCEDURE — 2500000003 HC RX 250 WO HCPCS: Performed by: THORACIC SURGERY (CARDIOTHORACIC VASCULAR SURGERY)

## 2021-05-08 PROCEDURE — 2140000000 HC CCU INTERMEDIATE R&B

## 2021-05-08 PROCEDURE — 83735 ASSAY OF MAGNESIUM: CPT

## 2021-05-08 RX ORDER — CALCIUM CARBONATE 200(500)MG
500 TABLET,CHEWABLE ORAL 3 TIMES DAILY
Status: DISCONTINUED | OUTPATIENT
Start: 2021-05-08 | End: 2021-05-10 | Stop reason: HOSPADM

## 2021-05-08 RX ADMIN — MUPIROCIN: 20 OINTMENT TOPICAL at 08:44

## 2021-05-08 RX ADMIN — FAMOTIDINE 20 MG: 10 INJECTION, SOLUTION INTRAVENOUS at 20:53

## 2021-05-08 RX ADMIN — GABAPENTIN 300 MG: 300 CAPSULE ORAL at 20:53

## 2021-05-08 RX ADMIN — FAMOTIDINE 20 MG: 10 INJECTION, SOLUTION INTRAVENOUS at 08:39

## 2021-05-08 RX ADMIN — INSULIN LISPRO 6 UNITS: 100 INJECTION, SOLUTION INTRAVENOUS; SUBCUTANEOUS at 11:52

## 2021-05-08 RX ADMIN — CLOPIDOGREL BISULFATE 75 MG: 75 TABLET, FILM COATED ORAL at 08:33

## 2021-05-08 RX ADMIN — MORPHINE SULFATE 15 MG: 15 TABLET, FILM COATED, EXTENDED RELEASE ORAL at 20:53

## 2021-05-08 RX ADMIN — BISACODYL 5 MG: 5 TABLET, COATED ORAL at 08:34

## 2021-05-08 RX ADMIN — LATANOPROST 1 DROP: 50 SOLUTION OPHTHALMIC at 20:54

## 2021-05-08 RX ADMIN — ROFLUMILAST 500 MCG: 500 TABLET ORAL at 20:54

## 2021-05-08 RX ADMIN — Medication 15 ML: at 20:55

## 2021-05-08 RX ADMIN — Medication 10 ML: at 08:40

## 2021-05-08 RX ADMIN — MORPHINE SULFATE 15 MG: 15 TABLET, FILM COATED, EXTENDED RELEASE ORAL at 08:33

## 2021-05-08 RX ADMIN — ANTACID TABLETS 500 MG: 500 TABLET, CHEWABLE ORAL at 16:37

## 2021-05-08 RX ADMIN — INSULIN LISPRO 2 UNITS: 100 INJECTION, SOLUTION INTRAVENOUS; SUBCUTANEOUS at 21:08

## 2021-05-08 RX ADMIN — POTASSIUM CHLORIDE 10 MEQ: 10 TABLET, EXTENDED RELEASE ORAL at 17:22

## 2021-05-08 RX ADMIN — FONDAPARINUX SODIUM 2.5 MG: 2.5 INJECTION, SOLUTION SUBCUTANEOUS at 08:42

## 2021-05-08 RX ADMIN — Medication 15 ML: at 08:44

## 2021-05-08 RX ADMIN — POTASSIUM CHLORIDE 10 MEQ: 10 TABLET, EXTENDED RELEASE ORAL at 12:26

## 2021-05-08 RX ADMIN — GABAPENTIN 300 MG: 300 CAPSULE ORAL at 08:34

## 2021-05-08 RX ADMIN — POLYETHYLENE GLYCOL 3350 17 G: 17 POWDER, FOR SOLUTION ORAL at 08:36

## 2021-05-08 RX ADMIN — POTASSIUM CHLORIDE 10 MEQ: 10 TABLET, EXTENDED RELEASE ORAL at 08:34

## 2021-05-08 RX ADMIN — DULOXETINE HYDROCHLORIDE 60 MG: 60 CAPSULE, DELAYED RELEASE ORAL at 08:35

## 2021-05-08 RX ADMIN — ANTACID TABLETS 500 MG: 500 TABLET, CHEWABLE ORAL at 20:53

## 2021-05-08 RX ADMIN — ATORVASTATIN CALCIUM 40 MG: 40 TABLET, FILM COATED ORAL at 20:54

## 2021-05-08 RX ADMIN — INSULIN GLARGINE 15 UNITS: 100 INJECTION, SOLUTION SUBCUTANEOUS at 21:07

## 2021-05-08 RX ADMIN — Medication 10 ML: at 20:54

## 2021-05-08 RX ADMIN — FUROSEMIDE 40 MG: 10 INJECTION, SOLUTION INTRAMUSCULAR; INTRAVENOUS at 08:37

## 2021-05-08 RX ADMIN — MAGNESIUM OXIDE TAB 400 MG (241.3 MG ELEMENTAL MG) 400 MG: 400 (241.3 MG) TAB at 08:34

## 2021-05-08 RX ADMIN — METOPROLOL TARTRATE 12.5 MG: 25 TABLET, FILM COATED ORAL at 21:01

## 2021-05-08 RX ADMIN — ASPIRIN 81 MG: 81 TABLET, COATED ORAL at 08:34

## 2021-05-08 RX ADMIN — Medication 10 ML: at 08:37

## 2021-05-08 RX ADMIN — METOPROLOL TARTRATE 12.5 MG: 25 TABLET, FILM COATED ORAL at 08:35

## 2021-05-08 ASSESSMENT — PAIN DESCRIPTION - PROGRESSION: CLINICAL_PROGRESSION: NOT CHANGED

## 2021-05-08 ASSESSMENT — PAIN SCALES - GENERAL
PAINLEVEL_OUTOF10: 0
PAINLEVEL_OUTOF10: 9

## 2021-05-08 ASSESSMENT — PAIN DESCRIPTION - DESCRIPTORS: DESCRIPTORS: ACHING;SORE

## 2021-05-08 ASSESSMENT — PAIN - FUNCTIONAL ASSESSMENT: PAIN_FUNCTIONAL_ASSESSMENT: ACTIVITIES ARE NOT PREVENTED

## 2021-05-08 ASSESSMENT — PAIN DESCRIPTION - FREQUENCY: FREQUENCY: CONTINUOUS

## 2021-05-08 NOTE — PROGRESS NOTES
Occupational Therapy  Facility/Department: [facility] C2 CARD TELEMETRY  Daily Treatment Note  NAME: Christina Colon  : 1950  MRN: [id number]    Date of Service: 2021    Discharge Recommendations:  24 hour supervision or assist, Home with Home health OT       Assessment   Performance deficits / Impairments: Decreased functional mobility ; Decreased ADL status; Decreased ROM; Decreased endurance;Decreased high-level IADLs;Decreased strength;Decreased balance  Assessment: Pt pleasant and agreeable to therapy. Pt demo'd improving tolerance for mobility with attempt for toilet transfer. Pt required co-tx with PT to address safe bathroom mobility with RW. Pt required frequent cues for positioning and posture. Pt educated on sternal precautions and importance of continued exercises. Cont with pOC. Disease Specific Education: Pt educated on sternal precautions, safe mobility, energy conservation, and task modification. Pt verbalized understanding    Prognosis: Good  OT Education: OT Role;Plan of Care;Family Education;Equipment;Precautions; ADL Adaptive Strategies;Transfer Training  Patient Education: disease specific: Pt educated on safety with transfers/ADLs following sternal precautions, pt verbalized understanding, will continue to require reinforcement. REQUIRES OT FOLLOW UP: Yes  Activity Tolerance  Activity Tolerance: Patient Tolerated treatment well  Activity Tolerance: /56 HR 87 O2 95%  Safety Devices  Safety Devices in place: Yes  Type of devices: Call light within reach; Bed alarm in place;Gait belt;Nurse notified; Left in bed         Patient Diagnosis(es): There were no encounter diagnoses.       has a past medical history of Atherosclerosis of native artery of right lower extremity with rest pain ([address] Back pain, Branch retinal vein occlusion, Bronchiectasis with acute exacerbation ([address] Closed compression fracture of thoracic vertebra ([address] Closed fracture of facial bone with routine healing, Signs  Patient Currently in Pain: Denies     Orientation  Orientation  Overall Orientation Status: Within Functional Limits     Objective    ADL  Feeding: Setup; Beverage management  LE Dressing: Dependent/Total  Toileting: Dependent/Total        Balance  Sitting Balance: Supervision  Standing Balance: Dependent/Total(min-mod A x2 RW)  Standing Balance  Time: ~5 minutes  Activity: functional mobility, bathroom mobility  Functional Mobility  Functional - Mobility Device: Rolling Walker  Activity: To/from bathroom  Assist Level: Dependent/Total  Functional Mobility Comments: vc's for safe walker use and upright posture  Toilet Transfers  Toilet Transfers Comments: simulated task but fatigued before completing transfer  Bed mobility  Rolling to Right: Moderate assistance  Supine to Sit: Unable to assess  Sit to Supine: 2 Person assistance; Moderate assistance  Transfers  Sit to stand: Moderate assistance;2 Person assistance  Stand to sit: Moderate assistance;2 Person assistance  Transfer Comments: cues for hand placement and sternal precautions                       Cognition  Overall Cognitive Status: Exceptions  Arousal/Alertness: Delayed responses to stimuli  Following Commands: Follows multistep commands with repitition; Follows multistep commands with increased time  Attention Span: Attends with cues to redirect  Memory: Decreased recall of precautions  Safety Judgement: Decreased awareness of need for safety  Problem Solving: Assistance required to correct errors made;Assistance required to identify errors made  Initiation: Requires cues for some  Sequencing: Requires cues for some                    Type of ROM/Therapeutic Exercise  Type of ROM/Therapeutic Exercise: AROM  Comment: BUE  Exercises  Elbow Extension: 10x  Supination: 10x  Pronation: 10x  Wrist Flexion: 10x  Wrist Extension: 10x  Grasp/Release: 10x                    Plan   Plan  Times per week: 4-6x's a week while in ICU  Current Treatment

## 2021-05-08 NOTE — PROGRESS NOTES
Physical Therapy  Facility/Department: [facility] C2 CARD TELEMETRY  Daily Treatment Note  NAME: Ave Hashimoto  : 1950  MRN: [id number]    Date of Service: 2021    Discharge Recommendations:  Subacute/Skilled Nursing Facility   PT Equipment Recommendations  Equipment Needed: No  Other: defer to patient's facility. If pt is unable to be seen after this session, please let this note serve as discharge summary. Please see case management note for discharge disposition. Thank you. Barriers to home discharge:   [x] Steps to access home entry or bed/bath: 2 GORDY   [x] No rail with steps to access home entry or bed/bath  Assessment   Body structures, Functions, Activity limitations: Decreased functional mobility ; Decreased endurance;Decreased strength;Decreased balance;Decreased posture; Increased pain  Assessment: Patient required co-treatment with PT to safely maximize patient's mobility today. Patient is progressing with her therapy goals. However she continues to require mod assist x 2 for transfers, mod assist x 2 for bed mobility and min assist x 2 to ambulate 20 feet with a rolling walker. Patient continues to present with the therapy deficits listed above. PT recommends that this patient receive skilled PT in the SNF setting, when medically stable, in order to address these deficits and to help her maximize her safety and independence with all functional mobiilty. PT to continue to follow. Treatment Diagnosis: Decreased endurance and impaired mobility s/p CABG x 3 surgery  Specific instructions for Next Treatment: Progress ther ex and mobility as tolerated, assess stair amb prior to D/C (if returning home)  Prognosis: Good  Decision Making: Medium Complexity  PT Education: Disease Specific Education;Goals;Transfer Training;PT Role;Energy Conservation; Functional Mobility Training;Plan of Care;General Safety;Precautions; Family Education;Equipment  Patient Education: Disease-specific education: Pt educated in sternal precautions and safety in bed mobility/transfers s/p CABG surgery. Pt voices understanding but will need education reinforcement. Barriers to Learning: none  REQUIRES PT FOLLOW UP: Yes  Activity Tolerance  Activity Tolerance: Patient limited by endurance; Patient limited by fatigue  Activity Tolerance: Vitals: 123/56 81 BPM 98% on room air. Patient Diagnosis(es): There were no encounter diagnoses. has a past medical history of Atherosclerosis of native artery of right lower extremity with rest pain ([address] Back pain, Branch retinal vein occlusion, Bronchiectasis with acute exacerbation (HCC), Closed compression fracture of thoracic vertebra ([address] Closed fracture of facial bone with routine healing, Closed jaw fracture ([address] Community acquired pneumonia of left lower lobe of lung, Compression fracture of L1 lumbar vertebra (HCC), COPD (chronic obstructive pulmonary disease) ([address] Fracture of tibial plateau, closed, left, initial encounter, Minimally displaced zone I fracture of sacrum (HCC), Mucus plugging of bronchi, Osteomyelitis of mandible, Osteoporosis with pathological fracture, Post herpetic neuralgia, Proximal humerus fracture, Rheumatoid arthritis ([address] Shingles, Sleep apnea, Temporal arteritis ([address] Tobacco use, Tracheomalacia, and Vitreous hemorrhage, right eye ([address] has a past surgical history that includes hernia repair; Mandible fracture surgery; Foot surgery; Elbow surgery; Cataract removal; Tubal ligation; Knee arthroscopy; Kyphosis surgery; laminectomy; Spinal fusion; Septoplasty (5/7/2013); Artery surgery (5/30/13); Upper gastrointestinal endoscopy (4/8/2014); bronchoscopy; bronchoscopy (N/A, 6/12/2019); Mandible fracture surgery (02/2020); back surgery (08/2020); other surgical history (01/08/2021); Pressure ulcer debridement (N/A, 1/8/2021); Artery Biopsy (Right, 03/01/2021);  Coronary artery bypass graft (N/A, 5/3/2021); and Mediastinoscopy (N/A, 5/5/2021). Restrictions  Restrictions/Precautions  Restrictions/Precautions: Fall Risk, General Precautions, Up as Tolerated, Cardiac  Position Activity Restriction  Sternal Precautions: No Pushing, No Pulling, 5# Lifting Restrictions  Other position/activity restrictions: High fall risk per nursing assessment, wound vac, Stage IV sacral wound, aleman  Subjective   General  Chart Reviewed: Yes  Additional Pertinent Hx: s/p urgent CABG x 3 on 5/03/21, s/p mediastinal exploration/evacuation of hematoma on 5/05/21, stage IV pressure ulcer on sacrum with home wound VAC prior to admission  Response To Previous Treatment: Patient with no complaints from previous session. Family / Caregiver Present: No  Subjective  Subjective: Pt agreeable to work with PT/OT  General Comment  Comments: Pt rsting in chair upon entry of therapy staff, ICU RN cleared pt for therapy session. Pain Screening  Patient Currently in Pain: Denies  Vital Signs  Patient Currently in Pain: Denies       Orientation  Orientation  Overall Orientation Status: Within Normal Limits  Cognition   Cognition  Overall Cognitive Status: Exceptions  Arousal/Alertness: Delayed responses to stimuli  Following Commands: Follows multistep commands with repitition; Follows multistep commands with increased time  Attention Span: Attends with cues to redirect  Memory: Decreased recall of precautions  Safety Judgement: Decreased awareness of need for safety  Problem Solving: Assistance required to correct errors made;Assistance required to identify errors made  Initiation: Requires cues for some  Sequencing: Requires cues for some  Objective   Bed mobility  Rolling to Left: Moderate assistance  Rolling to Right: Moderate assistance  Supine to Sit: Unable to assess  Sit to Supine: 2 Person assistance; Moderate assistance  Scooting: Moderate assistance;2 Person assistance(to scoot patient up in the bed)  Comment: head of bed elevated.  cueing for patient to maintain sternal precautions. Transfers  Sit to Stand: Moderate Assistance;2 Person Assistance  Stand to sit: Moderate Assistance;2 Person Assistance  Bed to Chair: Minimal assistance;2 Person Assistance  Comment: cueing for sternal precautions  Ambulation  Ambulation?: Yes  More Ambulation?: No  Ambulation 1  Surface: level tile  Device: Rolling Walker  Assistance: Minimal assistance;2 Person assistance  Quality of Gait: slow pace, flexed posture, verbal cues to keep base of support within walker frame, unsteady gait. cueing to correct. Gait Deviations: Slow Lilia;Decreased step length;Decreased step height  Distance: 2 feet x 2 (forward/backward) distance limited by fatigue. Then 20 feet. Comments: No complaints of shorness of breath, chest pain. she did complain of some dizziness when she stood. this quickly resolved after about 30 seconds.   Stairs/Curb  Stairs?: No     Balance  Posture: Fair  Sitting - Static: Fair  Sitting - Dynamic: Fair  Standing - Static: Poor;+  Standing - Dynamic: Poor;+  Comments: with RW  Exercises  Hip Flexion: 1 x 10  Hip Abduction: 1 x 10  Knee Long Arc Quad: 1 x 10  Ankle Pumps: 1 x 10                        AM-PAC Score     AM-PAC Inpatient Mobility without Stair Climbing Raw Score : 10 (05/08/21 1245)  AM-PAC Inpatient without Stair Climbing T-Scale Score : 34.07 (05/08/21 1245)  Mobility Inpatient CMS 0-100% Score: 71.66 (05/08/21 1245)  Mobility Inpatient without Stair CMS G-Code Modifier : CL (05/08/21 1245)       Goals  Short term goals  Time Frame for Short term goals: 1 week (5/13/21) unless otherwise specified  Short term goal 1: Pt will transfer supine <-> sit with Rose Marie x 1; 5/08 mod A x 2  Short term goal 2: Pt will transfer sit <-> stand and bed>chair using RW with supervision; mod A x2 with RW on 5/8  Short term goal 3: Pt will ambulate x 25 feet using RW with supervision (this is the max distance pt walks at baseline); 20 ft with min A x2 with RW  Short term goal 4: By 5/08/21: Pt will participate in 12-15 reps BLE exercise for ROM/strengthening and endurance; 5/08 progressing  Patient Goals   Patient goals : \"To get up and walk and hopefully improve my walking ability. \"    Plan    Plan  Times per week: 5-7x/week in acute care  Times per day: Daily  Specific instructions for Next Treatment: Progress ther ex and mobility as tolerated, assess stair amb prior to D/C (if returning home)  Current Treatment Recommendations: Strengthening, Transfer Training, Endurance Training, Gait Training, Functional Mobility Training, Safety Education & Training, Home Exercise Program, Pain Management, Balance Training, Stair training, Equipment Evaluation, Education, & procurement, Patient/Caregiver Education & Training  Safety Devices  Type of devices:  All fall risk precautions in place, Gait belt, Patient at risk for falls, Nurse notified, Call light within reach, Bed alarm in place, Left in bed     Therapy Time   Individual Concurrent Group Co-treatment   Time In 0911         Time Out 0940         Minutes 29         Timed Code Treatment Minutes: Izzy OLIVEIRA [address], PT

## 2021-05-09 LAB
ANION GAP SERPL CALCULATED.3IONS-SCNC: 7 MMOL/L (ref 3–16)
BUN BLDV-MCNC: 14 MG/DL (ref 7–20)
CALCIUM SERPL-MCNC: 8.2 MG/DL (ref 8.3–10.6)
CHLORIDE BLD-SCNC: 97 MMOL/L (ref 99–110)
CO2: 28 MMOL/L (ref 21–32)
CREAT SERPL-MCNC: 0.7 MG/DL (ref 0.6–1.2)
GFR AFRICAN AMERICAN: >60
GFR NON-AFRICAN AMERICAN: >60
GLUCOSE BLD-MCNC: 113 MG/DL (ref 70–99)
GLUCOSE BLD-MCNC: 120 MG/DL (ref 70–99)
GLUCOSE BLD-MCNC: 187 MG/DL (ref 70–99)
GLUCOSE BLD-MCNC: 197 MG/DL (ref 70–99)
GLUCOSE BLD-MCNC: 199 MG/DL (ref 70–99)
HCT VFR BLD CALC: 24.7 % (ref 36–48)
HEMOGLOBIN: 8.3 G/DL (ref 12–16)
MAGNESIUM: 2 MG/DL (ref 1.8–2.4)
MCH RBC QN AUTO: 29.8 PG (ref 26–34)
MCHC RBC AUTO-ENTMCNC: 33.5 G/DL (ref 31–36)
MCV RBC AUTO: 88.8 FL (ref 80–100)
PDW BLD-RTO: 16.4 % (ref 12.4–15.4)
PERFORMED ON: ABNORMAL
PLATELET # BLD: 188 K/UL (ref 135–450)
PMV BLD AUTO: 7.6 FL (ref 5–10.5)
POTASSIUM SERPL-SCNC: 3.8 MMOL/L (ref 3.5–5.1)
RBC # BLD: 2.78 M/UL (ref 4–5.2)
SODIUM BLD-SCNC: 132 MMOL/L (ref 136–145)
WBC # BLD: 5.5 K/UL (ref 4–11)

## 2021-05-09 PROCEDURE — 2580000003 HC RX 258: Performed by: THORACIC SURGERY (CARDIOTHORACIC VASCULAR SURGERY)

## 2021-05-09 PROCEDURE — 6370000000 HC RX 637 (ALT 250 FOR IP): Performed by: THORACIC SURGERY (CARDIOTHORACIC VASCULAR SURGERY)

## 2021-05-09 PROCEDURE — 85027 COMPLETE CBC AUTOMATED: CPT

## 2021-05-09 PROCEDURE — 97110 THERAPEUTIC EXERCISES: CPT

## 2021-05-09 PROCEDURE — 36592 COLLECT BLOOD FROM PICC: CPT

## 2021-05-09 PROCEDURE — 97116 GAIT TRAINING THERAPY: CPT

## 2021-05-09 PROCEDURE — 2140000000 HC CCU INTERMEDIATE R&B

## 2021-05-09 PROCEDURE — 6360000002 HC RX W HCPCS: Performed by: THORACIC SURGERY (CARDIOTHORACIC VASCULAR SURGERY)

## 2021-05-09 PROCEDURE — 2500000003 HC RX 250 WO HCPCS: Performed by: THORACIC SURGERY (CARDIOTHORACIC VASCULAR SURGERY)

## 2021-05-09 PROCEDURE — 99024 POSTOP FOLLOW-UP VISIT: CPT | Performed by: THORACIC SURGERY (CARDIOTHORACIC VASCULAR SURGERY)

## 2021-05-09 PROCEDURE — 97530 THERAPEUTIC ACTIVITIES: CPT

## 2021-05-09 PROCEDURE — 80048 BASIC METABOLIC PNL TOTAL CA: CPT

## 2021-05-09 PROCEDURE — 83735 ASSAY OF MAGNESIUM: CPT

## 2021-05-09 PROCEDURE — 6370000000 HC RX 637 (ALT 250 FOR IP): Performed by: NURSE PRACTITIONER

## 2021-05-09 RX ORDER — IPRATROPIUM BROMIDE AND ALBUTEROL SULFATE 2.5; .5 MG/3ML; MG/3ML
1 SOLUTION RESPIRATORY (INHALATION) EVERY 4 HOURS PRN
Status: DISCONTINUED | OUTPATIENT
Start: 2021-05-09 | End: 2021-05-10 | Stop reason: HOSPADM

## 2021-05-09 RX ORDER — LOSARTAN POTASSIUM 25 MG/1
12.5 TABLET ORAL DAILY
Status: DISCONTINUED | OUTPATIENT
Start: 2021-05-10 | End: 2021-05-10 | Stop reason: HOSPADM

## 2021-05-09 RX ADMIN — FONDAPARINUX SODIUM 2.5 MG: 2.5 INJECTION, SOLUTION SUBCUTANEOUS at 09:15

## 2021-05-09 RX ADMIN — ANTACID TABLETS 500 MG: 500 TABLET, CHEWABLE ORAL at 09:13

## 2021-05-09 RX ADMIN — MORPHINE SULFATE 15 MG: 15 TABLET, FILM COATED, EXTENDED RELEASE ORAL at 09:14

## 2021-05-09 RX ADMIN — DULOXETINE HYDROCHLORIDE 60 MG: 60 CAPSULE, DELAYED RELEASE ORAL at 09:14

## 2021-05-09 RX ADMIN — Medication 15 ML: at 20:18

## 2021-05-09 RX ADMIN — ROFLUMILAST 500 MCG: 500 TABLET ORAL at 20:17

## 2021-05-09 RX ADMIN — POTASSIUM CHLORIDE 10 MEQ: 10 TABLET, EXTENDED RELEASE ORAL at 12:38

## 2021-05-09 RX ADMIN — Medication 10 ML: at 20:18

## 2021-05-09 RX ADMIN — GABAPENTIN 300 MG: 300 CAPSULE ORAL at 20:17

## 2021-05-09 RX ADMIN — FAMOTIDINE 20 MG: 10 INJECTION, SOLUTION INTRAVENOUS at 09:16

## 2021-05-09 RX ADMIN — INSULIN GLARGINE 15 UNITS: 100 INJECTION, SOLUTION SUBCUTANEOUS at 20:18

## 2021-05-09 RX ADMIN — MORPHINE SULFATE 15 MG: 15 TABLET, FILM COATED, EXTENDED RELEASE ORAL at 20:17

## 2021-05-09 RX ADMIN — GABAPENTIN 300 MG: 300 CAPSULE ORAL at 09:14

## 2021-05-09 RX ADMIN — INSULIN LISPRO 3 UNITS: 100 INJECTION, SOLUTION INTRAVENOUS; SUBCUTANEOUS at 12:38

## 2021-05-09 RX ADMIN — POLYETHYLENE GLYCOL 3350 17 G: 17 POWDER, FOR SOLUTION ORAL at 09:12

## 2021-05-09 RX ADMIN — Medication 10 ML: at 09:17

## 2021-05-09 RX ADMIN — INSULIN LISPRO 2 UNITS: 100 INJECTION, SOLUTION INTRAVENOUS; SUBCUTANEOUS at 20:18

## 2021-05-09 RX ADMIN — ANTACID TABLETS 500 MG: 500 TABLET, CHEWABLE ORAL at 20:17

## 2021-05-09 RX ADMIN — FAMOTIDINE 20 MG: 10 INJECTION, SOLUTION INTRAVENOUS at 20:19

## 2021-05-09 RX ADMIN — LATANOPROST 1 DROP: 50 SOLUTION OPHTHALMIC at 20:18

## 2021-05-09 RX ADMIN — INSULIN LISPRO 3 UNITS: 100 INJECTION, SOLUTION INTRAVENOUS; SUBCUTANEOUS at 17:41

## 2021-05-09 RX ADMIN — CLOPIDOGREL BISULFATE 75 MG: 75 TABLET, FILM COATED ORAL at 09:14

## 2021-05-09 RX ADMIN — MAGNESIUM OXIDE TAB 400 MG (241.3 MG ELEMENTAL MG) 400 MG: 400 (241.3 MG) TAB at 09:13

## 2021-05-09 RX ADMIN — ATORVASTATIN CALCIUM 40 MG: 40 TABLET, FILM COATED ORAL at 20:17

## 2021-05-09 RX ADMIN — POTASSIUM CHLORIDE 10 MEQ: 10 TABLET, EXTENDED RELEASE ORAL at 08:24

## 2021-05-09 RX ADMIN — BISACODYL 5 MG: 5 TABLET, COATED ORAL at 09:13

## 2021-05-09 RX ADMIN — METOPROLOL TARTRATE 12.5 MG: 25 TABLET, FILM COATED ORAL at 20:17

## 2021-05-09 RX ADMIN — ASPIRIN 81 MG: 81 TABLET, COATED ORAL at 09:13

## 2021-05-09 RX ADMIN — POTASSIUM CHLORIDE 10 MEQ: 10 TABLET, EXTENDED RELEASE ORAL at 17:41

## 2021-05-09 RX ADMIN — METOPROLOL TARTRATE 12.5 MG: 25 TABLET, FILM COATED ORAL at 10:40

## 2021-05-09 RX ADMIN — ANTACID TABLETS 500 MG: 500 TABLET, CHEWABLE ORAL at 14:17

## 2021-05-09 RX ADMIN — OXYCODONE 10 MG: 5 TABLET ORAL at 20:17

## 2021-05-09 RX ADMIN — Medication 15 ML: at 09:19

## 2021-05-09 ASSESSMENT — PAIN DESCRIPTION - LOCATION: LOCATION: BACK

## 2021-05-09 ASSESSMENT — PAIN SCALES - GENERAL
PAINLEVEL_OUTOF10: 9
PAINLEVEL_OUTOF10: 0
PAINLEVEL_OUTOF10: 9
PAINLEVEL_OUTOF10: 0
PAINLEVEL_OUTOF10: 0
PAINLEVEL_OUTOF10: 9
PAINLEVEL_OUTOF10: 9

## 2021-05-09 ASSESSMENT — PAIN DESCRIPTION - FREQUENCY
FREQUENCY: CONTINUOUS
FREQUENCY: CONTINUOUS

## 2021-05-09 ASSESSMENT — PAIN DESCRIPTION - ORIENTATION: ORIENTATION: LOWER

## 2021-05-09 ASSESSMENT — PAIN DESCRIPTION - DESCRIPTORS: DESCRIPTORS: SORE;ACHING

## 2021-05-09 ASSESSMENT — PAIN DESCRIPTION - PAIN TYPE
TYPE: CHRONIC PAIN

## 2021-05-09 ASSESSMENT — PAIN DESCRIPTION - PROGRESSION: CLINICAL_PROGRESSION: NOT CHANGED

## 2021-05-09 ASSESSMENT — PAIN - FUNCTIONAL ASSESSMENT: PAIN_FUNCTIONAL_ASSESSMENT: ACTIVITIES ARE NOT PREVENTED

## 2021-05-09 NOTE — PROGRESS NOTES
Occupational Therapy  Facility/Department: [facility] C2 CARD TELEMETRY  Daily Treatment Note  NAME: Marita Eubanks  : 1950  MRN: [id number]    Date of Service: 2021    Discharge Recommendations:  [address]    If pt discharges prior to next session, this note will serve as discharge summary. See case management note for discharge disposition. Assessment   Performance deficits / Impairments: Decreased functional mobility ; Decreased ADL status; Decreased ROM; Decreased endurance;Decreased high-level IADLs;Decreased strength;Decreased balance  Assessment: Pt pleasant and cooperative, seen for cotx with PT to address transfers and mobility d/t assist level and mgmt of multiple lines. Pt requires mod A x 2 for sit to stands, limited standing and activity tolerance this date, as pt fatigues easily with min activity. Pt requires max A x2 for sit>supine and for repositioning in bed. Recommend SNF d/t poor standing and activity tolerance and limited tolerance to therapy. OT Education: OT Role;Plan of Care;Family Education;Equipment;Precautions; ADL Adaptive Strategies;Transfer Training  Patient Education: disease specific: Pt educated on safety with transfers/ADLs following sternal precautions, pt verbalized understanding, will continue to require reinforcement. Activity Tolerance  Activity Tolerance: Patient Tolerated treatment well  Activity Tolerance: 121/59 hr 91, O2 100%  Safety Devices  Safety Devices in place: Yes  Type of devices: Call light within reach; Bed alarm in place;Gait belt;Nurse notified; Left in bed         Patient Diagnosis(es): There were no encounter diagnoses.       has a past medical history of Atherosclerosis of native artery of right lower extremity with rest pain ([address] Back pain, Branch retinal vein occlusion, Bronchiectasis with acute exacerbation ([address] Closed compression fracture of thoracic vertebra ([address] Closed fracture of facial bone with routine healing, Closed jaw fracture ([address] Community acquired pneumonia of left lower lobe of lung, Compression fracture of L1 lumbar vertebra ([address] COPD (chronic obstructive pulmonary disease) ([address] Fracture of tibial plateau, closed, left, initial encounter, Minimally displaced zone I fracture of sacrum ([address] Mucus plugging of bronchi, Osteomyelitis of mandible, Osteoporosis with pathological fracture, Post herpetic neuralgia, Proximal humerus fracture, Rheumatoid arthritis ([address] Shingles, Sleep apnea, Temporal arteritis ([address] Tobacco use, Tracheomalacia, and Vitreous hemorrhage, right eye ([address] has a past surgical history that includes hernia repair; Mandible fracture surgery; Foot surgery; Elbow surgery; Cataract removal; Tubal ligation; Knee arthroscopy; Kyphosis surgery; laminectomy; Spinal fusion; Septoplasty (5/7/2013); Artery surgery (5/30/13); Upper gastrointestinal endoscopy (4/8/2014); bronchoscopy; bronchoscopy (N/A, 6/12/2019); Mandible fracture surgery (02/2020); back surgery (08/2020); other surgical history (01/08/2021); Pressure ulcer debridement (N/A, 1/8/2021); Artery Biopsy (Right, 03/01/2021); Coronary artery bypass graft (N/A, 5/3/2021); and Mediastinoscopy (N/A, 5/5/2021).     Restrictions  Restrictions/Precautions  Restrictions/Precautions: Fall Risk, General Precautions, Up as Tolerated, Cardiac  Position Activity Restriction  Sternal Precautions: No Pushing, No Pulling, 5# Lifting Restrictions  Other position/activity restrictions: High fall risk per nursing assessment, wound vac, Stage IV sacral wound, aleman  Subjective   General  Chart Reviewed: Yes  Patient assessed for rehabilitation services?: Yes  Family / Caregiver Present: No  Referring Practitioner: Azeem Ruano NP  Diagnosis: NSTEMI; Urgent CABG X 3 5/3/21; hematoma, s/p mediastinal exploration and evacuation of hematoma 5/5/21  Subjective  Subjective: Pt agreeable to OT  General Comment  Comments: RN approved therapy  Vital Signs  Patient Currently in Pain: Yes   Orientation  Orientation  Overall Orientation Status: Within Functional Limits  Objective    ADL  Toileting: Dependent/Total(aleman)  Additional Comments: vc's for sternal precautions, pt reports ADLs already performed this date        Balance  Sitting Balance: Supervision  Standing Balance: Dependent/Total(min A x 2 with RW)  Standing Balance  Time: 2-3 min  Activity: amb from chair, around foot of bed  Functional Mobility  Functional - Mobility Device: Rolling Walker  Activity: Other  Assist Level: Dependent/Total(min/mod A X 2)  Functional Mobility Comments: vc's for safe walker use and upright posture     Transfers  Sit to stand: Moderate assistance;2 Person assistance  Stand to sit: Moderate assistance;2 Person assistance  Transfer Comments: cues for hand placement and sternal precautions                       Cognition  Overall Cognitive Status: Exceptions  Arousal/Alertness: Delayed responses to stimuli  Following Commands: Follows multistep commands with repitition; Follows multistep commands with increased time  Attention Span: Attends with cues to redirect  Memory: Decreased recall of precautions  Safety Judgement: Decreased awareness of need for safety  Problem Solving: Assistance required to correct errors made;Assistance required to identify errors made  Initiation: Requires cues for some  Sequencing: Requires cues for some                    Type of ROM/Therapeutic Exercise  Type of ROM/Therapeutic Exercise: AROM  Comment: BUE  Exercises  Elbow Flexion: 10x  Elbow Extension: 10x  Supination: 10x  Pronation: 10x  Wrist Flexion: 10x  Wrist Extension: 10x  Finger Flexion: 10x  Finger Extension: 10x                    Plan   Plan  Times per week: 4-6x's a week while in ICU  Current Treatment Recommendations: ROM, Balance Training, Functional Mobility Training, Endurance Training, Self-Care / ADL, Safety Education & Training    AM-PAC Score        AM-PAC Inpatient Daily Activity Raw Score: 14 (05/09/21 1333)  AM-PAC Inpatient ADL T-Scale Score : 33.39 (05/09/21 1333)  ADL Inpatient CMS 0-100% Score: 59.67 (05/09/21 1333)  ADL Inpatient CMS G-Code Modifier : CK (05/09/21 1333)    Goals  Short term goals  Time Frame for Short term goals: 2 week unless otherwise specified 5/20  Short term goal 1: Pt will complete LB dressing with CGA-ongoing, total assist 5/9  Short term goal 2: Pt will complete functional transfers with SBA using RW-ongoing, mod x2 with 4WW 5/9  Short term goal 3: Pt will tolerate B UE ex x 20 reps w/o fatigue to increase strength for ADLs/transfers by 5/15-ongoing, 10x 5/9  Short term goal 4: Pt will complete toileting with supervision-ongoing, ash 5/9  Patient Goals   Patient goals : \"get back to my life after surgery\"       Therapy Time   Individual Concurrent Group Co-treatment   Time In       0935   Time Out       0959   Minutes       24   Timed Code Treatment Minutes: [address] [facility], OT

## 2021-05-09 NOTE — PLAN OF CARE
Pt high fall risk. Instructed to use call light before getting out of bed. Call light within reach. Bed in low position. Bed alarm on. Will continue to monitor. Pt complaint of pain. See MAR. Call light in reach, will continue to monitor. Pt and family educated about proper transfer techniques at home. Pt and family verbalized understanding. Will continue to monitor.

## 2021-05-09 NOTE — PROGRESS NOTES
Physical Therapy  Facility/Department: [facility] C2 CARD TELEMETRY  Daily Treatment Note  NAME: Karime Acevedo  : 1950  MRN: [id number]    Date of Service: 2021    Discharge Recommendations:  Subacute/Skilled Nursing Facility   PT Equipment Recommendations  Equipment Needed: No  Other: defer to patient's facility. Assessment   Body structures, Functions, Activity limitations: Decreased functional mobility ; Decreased endurance;Decreased strength;Decreased balance;Decreased posture; Increased pain  Assessment: Pt sitting in chair on therapy arrival, agreeable to PT co-tx with OT at this date with c/o LBP and dizziness. Pt req co-tx in order to safely maximize OOB mobility with appropriate guarding, line management, and cuing for sternal precautions. Pt req grossly modAx2 for STS t/f, CGAx2 with 2WW for 20' ambulation, and maxAx2 for bed mobility at end of session. Pt reported increased fatigue/dizziness with ambulation but was encouraged to participate within activity tolerance. Pt currently functioning below baseline with deficits outlined above and will benefit from skilled PT tx throughout LOS. Anticipate d/c SNF in order to improve safe mobility. Treatment Diagnosis: Decreased endurance and impaired mobility s/p CABG x 3 surgery  Specific instructions for Next Treatment: Progress ther ex and mobility as tolerated, assess stair amb prior to D/C (if returning home)  Prognosis: Good  Decision Making: Medium Complexity  PT Education: Disease Specific Education;Goals;Transfer Training;PT Role;Energy Conservation; Functional Mobility Training;Plan of Care;General Safety;Precautions; Family Education;Equipment  Patient Education: Disease-specific education: Pt educated in sternal precautions and safety in bed mobility/transfers s/p CABG surgery. Pt voices understanding but will need education reinforcement.   Barriers to Learning: none  REQUIRES PT FOLLOW UP: Yes  Activity Tolerance  Activity Tolerance: Patient limited by endurance; Patient limited by fatigue  Activity Tolerance: Vitals: 137/91, 92% SpO2, and 102 BPM following ambulation, supine in bed. Pt had increased reported dizziness at end of session associated with positional changes and ambulation, stable in bed following session     Patient Diagnosis(es): There were no encounter diagnoses. has a past medical history of Atherosclerosis of native artery of right lower extremity with rest pain ([address] Back pain, Branch retinal vein occlusion, Bronchiectasis with acute exacerbation (HCC), Closed compression fracture of thoracic vertebra ([address] Closed fracture of facial bone with routine healing, Closed jaw fracture ([address] Community acquired pneumonia of left lower lobe of lung, Compression fracture of L1 lumbar vertebra (HCC), COPD (chronic obstructive pulmonary disease) ([address] Fracture of tibial plateau, closed, left, initial encounter, Minimally displaced zone I fracture of sacrum (HCC), Mucus plugging of bronchi, Osteomyelitis of mandible, Osteoporosis with pathological fracture, Post herpetic neuralgia, Proximal humerus fracture, Rheumatoid arthritis ([address] Shingles, Sleep apnea, Temporal arteritis ([address] Tobacco use, Tracheomalacia, and Vitreous hemorrhage, right eye ([address] has a past surgical history that includes hernia repair; Mandible fracture surgery; Foot surgery; Elbow surgery; Cataract removal; Tubal ligation; Knee arthroscopy; Kyphosis surgery; laminectomy; Spinal fusion; Septoplasty (5/7/2013); Artery surgery (5/30/13); Upper gastrointestinal endoscopy (4/8/2014); bronchoscopy; bronchoscopy (N/A, 6/12/2019); Mandible fracture surgery (02/2020); back surgery (08/2020); other surgical history (01/08/2021); Pressure ulcer debridement (N/A, 1/8/2021); Artery Biopsy (Right, 03/01/2021); Coronary artery bypass graft (N/A, 5/3/2021); and Mediastinoscopy (N/A, 5/5/2021).     Restrictions  Restrictions/Precautions  Restrictions/Precautions: Fall Risk, General Precautions, Up as Tolerated, Cardiac  Position Activity Restriction  Sternal Precautions: No Pushing, No Pulling, 5# Lifting Restrictions  Other position/activity restrictions: High fall risk per nursing assessment, wound vac, Stage IV sacral wound, aleman  Subjective   General  Chart Reviewed: Yes  Additional Pertinent Hx: s/p urgent CABG x 3 on 21, s/p mediastinal exploration/evacuation of hematoma on 21, stage IV pressure ulcer on sacrum with home wound VAC prior to admission  Response To Previous Treatment: Patient with no complaints from previous session. Family / Caregiver Present: No  Referring Practitioner: Dr. Mary Hernandez (order for PT re-evaluation on 21)  Subjective  Subjective: Pt agreeable to work with PT/OT, sitting in chair on therapy entry with c/o back pain. Agreeable to PT co-tx  General Comment  Comments: RN cleared pt for therapy session  Pain Screening  Patient Currently in Pain: Yes  Pain Assessment  Pain Assessment: 0-10  Pain Level: 9  Patient's Stated Pain Goal: No pain  Pain Type: Chronic pain  Pain Location: Back  Pain Orientation: Lower  Pain Descriptors: Aching  Pain Frequency: Continuous  Vital Signs  Patient Currently in Pain: Yes       Orientation  Orientation  Overall Orientation Status: Within Normal Limits  Cognition      Objective   Bed mobility  Sit to Supine: 2 Person assistance  Scootin Person assistance;Maximal assistance  Comment: Pt req maxAx2 for moving sit to supine at end of session and scooting up in bed with HOB lowered. PT assist directed towards BLE management, lines/tubes, and verbalziing sternal precaution cuing; OT assist directed towards BUE management and trunk safety/control during t/f. Transfers  Sit to Stand:  Moderate Assistance;2 Person Assistance  Stand to sit: Moderate Assistance;2 Person Assistance  Comment: Pt modAx2 for STS from recliner and EOB, PT and OT assist directed towards skilled guarding of trunk and maintaining EUSEBIO within

## 2021-05-10 VITALS
RESPIRATION RATE: 20 BRPM | SYSTOLIC BLOOD PRESSURE: 113 MMHG | BODY MASS INDEX: 22.98 KG/M2 | WEIGHT: 160.5 LBS | OXYGEN SATURATION: 96 % | HEART RATE: 92 BPM | HEIGHT: 70 IN | DIASTOLIC BLOOD PRESSURE: 58 MMHG | TEMPERATURE: 98.4 F

## 2021-05-10 LAB
ANION GAP SERPL CALCULATED.3IONS-SCNC: 8 MMOL/L (ref 3–16)
BUN BLDV-MCNC: 11 MG/DL (ref 7–20)
CALCIUM SERPL-MCNC: 8.1 MG/DL (ref 8.3–10.6)
CHLORIDE BLD-SCNC: 100 MMOL/L (ref 99–110)
CO2: 25 MMOL/L (ref 21–32)
CREAT SERPL-MCNC: 0.7 MG/DL (ref 0.6–1.2)
GFR AFRICAN AMERICAN: >60
GFR NON-AFRICAN AMERICAN: >60
GLUCOSE BLD-MCNC: 137 MG/DL (ref 70–99)
GLUCOSE BLD-MCNC: 309 MG/DL (ref 70–99)
GLUCOSE BLD-MCNC: 94 MG/DL (ref 70–99)
HCT VFR BLD CALC: 25.6 % (ref 36–48)
HEMOGLOBIN: 8.5 G/DL (ref 12–16)
MAGNESIUM: 1.9 MG/DL (ref 1.8–2.4)
MCH RBC QN AUTO: 30 PG (ref 26–34)
MCHC RBC AUTO-ENTMCNC: 33.2 G/DL (ref 31–36)
MCV RBC AUTO: 90.5 FL (ref 80–100)
PDW BLD-RTO: 16.9 % (ref 12.4–15.4)
PERFORMED ON: ABNORMAL
PERFORMED ON: ABNORMAL
PLATELET # BLD: 206 K/UL (ref 135–450)
PMV BLD AUTO: 7.5 FL (ref 5–10.5)
POTASSIUM SERPL-SCNC: 4.2 MMOL/L (ref 3.5–5.1)
RBC # BLD: 2.83 M/UL (ref 4–5.2)
SODIUM BLD-SCNC: 133 MMOL/L (ref 136–145)
WBC # BLD: 5.9 K/UL (ref 4–11)

## 2021-05-10 PROCEDURE — 2580000003 HC RX 258: Performed by: THORACIC SURGERY (CARDIOTHORACIC VASCULAR SURGERY)

## 2021-05-10 PROCEDURE — 6370000000 HC RX 637 (ALT 250 FOR IP): Performed by: THORACIC SURGERY (CARDIOTHORACIC VASCULAR SURGERY)

## 2021-05-10 PROCEDURE — 99024 POSTOP FOLLOW-UP VISIT: CPT | Performed by: NURSE PRACTITIONER

## 2021-05-10 PROCEDURE — 80048 BASIC METABOLIC PNL TOTAL CA: CPT

## 2021-05-10 PROCEDURE — 2500000003 HC RX 250 WO HCPCS: Performed by: THORACIC SURGERY (CARDIOTHORACIC VASCULAR SURGERY)

## 2021-05-10 PROCEDURE — 94640 AIRWAY INHALATION TREATMENT: CPT

## 2021-05-10 PROCEDURE — 97110 THERAPEUTIC EXERCISES: CPT

## 2021-05-10 PROCEDURE — 97530 THERAPEUTIC ACTIVITIES: CPT

## 2021-05-10 PROCEDURE — 83735 ASSAY OF MAGNESIUM: CPT

## 2021-05-10 PROCEDURE — 97535 SELF CARE MNGMENT TRAINING: CPT

## 2021-05-10 PROCEDURE — 6360000002 HC RX W HCPCS: Performed by: THORACIC SURGERY (CARDIOTHORACIC VASCULAR SURGERY)

## 2021-05-10 PROCEDURE — 36592 COLLECT BLOOD FROM PICC: CPT

## 2021-05-10 PROCEDURE — 85027 COMPLETE CBC AUTOMATED: CPT

## 2021-05-10 PROCEDURE — 6370000000 HC RX 637 (ALT 250 FOR IP): Performed by: NURSE PRACTITIONER

## 2021-05-10 PROCEDURE — 97116 GAIT TRAINING THERAPY: CPT

## 2021-05-10 RX ORDER — CLOPIDOGREL BISULFATE 75 MG/1
75 TABLET ORAL DAILY
Qty: 30 TABLET | Refills: 0 | Status: SHIPPED | OUTPATIENT
Start: 2021-05-11 | End: 2021-06-10 | Stop reason: SDUPTHER

## 2021-05-10 RX ORDER — LOSARTAN POTASSIUM 25 MG/1
12.5 TABLET ORAL DAILY
Qty: 15 TABLET | Refills: 0 | Status: SHIPPED | OUTPATIENT
Start: 2021-05-11 | End: 2021-06-09

## 2021-05-10 RX ORDER — INSULIN GLARGINE 100 [IU]/ML
15 INJECTION, SOLUTION SUBCUTANEOUS NIGHTLY
Qty: 1 VIAL | Refills: 0 | Status: ON HOLD
Start: 2021-05-10 | End: 2021-12-28 | Stop reason: SDUPTHER

## 2021-05-10 RX ADMIN — IPRATROPIUM BROMIDE AND ALBUTEROL SULFATE 1 AMPULE: .5; 3 SOLUTION RESPIRATORY (INHALATION) at 08:46

## 2021-05-10 RX ADMIN — POTASSIUM CHLORIDE 10 MEQ: 10 TABLET, EXTENDED RELEASE ORAL at 08:30

## 2021-05-10 RX ADMIN — MAGNESIUM SULFATE HEPTAHYDRATE 2000 MG: 40 INJECTION, SOLUTION INTRAVENOUS at 06:04

## 2021-05-10 RX ADMIN — POTASSIUM CHLORIDE 20 MEQ: 29.8 INJECTION, SOLUTION INTRAVENOUS at 06:04

## 2021-05-10 RX ADMIN — POTASSIUM CHLORIDE 10 MEQ: 10 TABLET, EXTENDED RELEASE ORAL at 12:31

## 2021-05-10 RX ADMIN — BISACODYL 5 MG: 5 TABLET, COATED ORAL at 08:59

## 2021-05-10 RX ADMIN — CLOPIDOGREL BISULFATE 75 MG: 75 TABLET, FILM COATED ORAL at 09:02

## 2021-05-10 RX ADMIN — Medication 15 ML: at 09:03

## 2021-05-10 RX ADMIN — FAMOTIDINE 20 MG: 10 INJECTION, SOLUTION INTRAVENOUS at 09:04

## 2021-05-10 RX ADMIN — DULOXETINE HYDROCHLORIDE 60 MG: 60 CAPSULE, DELAYED RELEASE ORAL at 08:59

## 2021-05-10 RX ADMIN — ANTACID TABLETS 500 MG: 500 TABLET, CHEWABLE ORAL at 09:02

## 2021-05-10 RX ADMIN — MORPHINE SULFATE 15 MG: 15 TABLET, FILM COATED, EXTENDED RELEASE ORAL at 09:02

## 2021-05-10 RX ADMIN — MAGNESIUM OXIDE TAB 400 MG (241.3 MG ELEMENTAL MG) 400 MG: 400 (241.3 MG) TAB at 09:02

## 2021-05-10 RX ADMIN — ASPIRIN 81 MG: 81 TABLET, COATED ORAL at 08:59

## 2021-05-10 RX ADMIN — ANTACID TABLETS 500 MG: 500 TABLET, CHEWABLE ORAL at 15:25

## 2021-05-10 RX ADMIN — Medication 10 ML: at 09:04

## 2021-05-10 RX ADMIN — GABAPENTIN 300 MG: 300 CAPSULE ORAL at 09:02

## 2021-05-10 RX ADMIN — FONDAPARINUX SODIUM 2.5 MG: 2.5 INJECTION, SOLUTION SUBCUTANEOUS at 09:06

## 2021-05-10 RX ADMIN — INSULIN LISPRO 12 UNITS: 100 INJECTION, SOLUTION INTRAVENOUS; SUBCUTANEOUS at 11:50

## 2021-05-10 RX ADMIN — POLYETHYLENE GLYCOL 3350 17 G: 17 POWDER, FOR SOLUTION ORAL at 08:58

## 2021-05-10 ASSESSMENT — PAIN DESCRIPTION - ORIENTATION: ORIENTATION: LOWER

## 2021-05-10 ASSESSMENT — PAIN DESCRIPTION - FREQUENCY: FREQUENCY: CONTINUOUS

## 2021-05-10 ASSESSMENT — PAIN DESCRIPTION - PROGRESSION
CLINICAL_PROGRESSION: NOT CHANGED
CLINICAL_PROGRESSION: NOT CHANGED

## 2021-05-10 ASSESSMENT — PAIN DESCRIPTION - PAIN TYPE
TYPE: CHRONIC PAIN
TYPE: CHRONIC PAIN

## 2021-05-10 ASSESSMENT — PAIN DESCRIPTION - DESCRIPTORS: DESCRIPTORS: ACHING

## 2021-05-10 ASSESSMENT — PAIN DESCRIPTION - ONSET: ONSET: ON-GOING

## 2021-05-10 ASSESSMENT — PAIN DESCRIPTION - LOCATION: LOCATION: BACK

## 2021-05-10 ASSESSMENT — PAIN - FUNCTIONAL ASSESSMENT: PAIN_FUNCTIONAL_ASSESSMENT: ACTIVITIES ARE NOT PREVENTED

## 2021-05-10 ASSESSMENT — PAIN SCALES - GENERAL
PAINLEVEL_OUTOF10: 9
PAINLEVEL_OUTOF10: 0

## 2021-05-10 NOTE — CARE COORDINATION
CASE MANAGEMENT DISCHARGE SUMMARY      Discharge to: home with family    Precertification completed: NA  Hospital Exemption Notification (HENS) completed: NA    New Durable Medical Equipment ordered/agency: Patient already has wound vac at home provided by [employer]. Transportation:    Family/car: татьяна Toscano     Confirmed discharge plan with: татьяна Toscano     Patient: yes      Family, name and contact number:  Maura Hampton [phone]     Facility/Agency, name:  [facility] for wound vac and [facility] for [address]. Orders faxed    RN, name: Cristiano Evans    Note: Discharging nurse to complete AUTUMN, reconcile AVS, and place final copy with patient's discharge packet.      Ifeoma Hooper RN

## 2021-05-10 NOTE — PROGRESS NOTES
Occupational Therapy  Facility/Department: [facility] C2 CARD TELEMETRY  Daily Treatment Note  NAME: Bhargavi Moran  : 1950  MRN: [id number]    Date of Service: 5/10/2021    Discharge Recommendations:  Subacute/Skilled Nursing Facility     Assessment   Performance deficits / Impairments: Decreased functional mobility ; Decreased ADL status; Decreased ROM; Decreased endurance;Decreased high-level IADLs;Decreased strength;Decreased balance  Assessment: Pt seen for cotx with PT today to safely progress ADLs and mobility due to extent of pt's deficits. Pt is min A for transfers to toilet/bed with RW due to decreased endurance and balance. Education provided on sternal precautions, ADL skills, mobility and home safety. Pt will need 24/7 support at home. Recommend HHOT. Cont skilled OT in acute care. Prognosis: Good  OT Education: OT Role;Plan of Care;Family Education;Equipment;Precautions; ADL Adaptive Strategies;Transfer Training  Patient Education: disease specific: Pt educated on safety with transfers/ADLs following sternal precautions, pt verbalized understanding, will continue to require reinforcement. REQUIRES OT FOLLOW UP: Yes  Activity Tolerance  Activity Tolerance: Patient Tolerated treatment well  Activity Tolerance: /54, HR 87, O2 sats95% RA  Safety Devices  Safety Devices in place: Yes  Type of devices: Call light within reach; Bed alarm in place;Gait belt;Nurse notified; Left in bed       Patient Diagnosis(es): There were no encounter diagnoses.       has a past medical history of Atherosclerosis of native artery of right lower extremity with rest pain ([address] Back pain, Branch retinal vein occlusion, Bronchiectasis with acute exacerbation (HCC), Closed compression fracture of thoracic vertebra ([address] Closed fracture of facial bone with routine healing, Closed jaw fracture ([address] Community acquired pneumonia of left lower lobe of lung, Compression fracture of L1 lumbar vertebra ([address] COPD (chronic term goals: 2 week unless otherwise specified 5/20  Short term goal 1: Pt will complete LB dressing with CGA-ongoing, max A 5/10  Short term goal 2: Pt will complete functional transfers with SBA using RW-ongoing, min A with RW 5/10  Short term goal 3: Pt will tolerate B UE ex x 20 reps w/o fatigue to increase strength for ADLs/transfers by 5/15-ongoing, 10x 5/10  Short term goal 4: Pt will complete toileting with supervision-ongoing, mod A 5/10  Patient Goals   Patient goals : \"get back to my life after surgery\"     Therapy Time   Individual Concurrent Group Co-treatment   Time In 1003         Time Out 1041         Minutes 38         Timed Code Treatment Minutes: 45 Minutes    If pt is discharged prior to next OT session, this note will serve as the discharge summary.   Keisha Eddy OT

## 2021-05-10 NOTE — PROGRESS NOTES
Physical Therapy  Facility/Department: [facility] C2 CARD TELEMETRY  Daily Treatment Note  NAME: Katina Doty  : 1950  MRN: [id number]    Date of Service: 5/10/2021    Discharge Recommendations:  Subacute/Skilled Nursing Facility   PT Equipment Recommendations  Other: defer to patient's facility. Assessment   Body structures, Functions, Activity limitations: Decreased functional mobility ; Decreased endurance;Decreased strength;Decreased balance;Decreased posture; Increased pain  Assessment: Pt resting in bed upon entry, required SBA for supine to sit, mod A for sit to supine. pt required min A for static standing balance and for gait with RW up to 75ft. Pt very fatigued and SOB with mobility but vitals stable. Cont per POC to address deficits. Pt was seen as co tx with OT due to extent of deficits to maximize functional mobility. Recommend SNF at [place] from acute setting  Treatment Diagnosis: Decreased endurance and impaired mobility s/p CABG x 3 surgery  Specific instructions for Next Treatment: Progress ther ex and mobility as tolerated, assess stair amb prior to D/C (if returning home)  Prognosis: Good  Decision Making: Medium Complexity  PT Education: Disease Specific Education;Goals;Transfer Training;PT Role;Energy Conservation; Functional Mobility Training;Plan of Care;General Safety;Precautions; Family Education;Equipment  Patient Education: Disease-specific education: Pt educated in sternal precautions and safety in bed mobility/transfers s/p CABG surgery. Pt voices understanding but will need education reinforcement. Barriers to Learning: none  REQUIRES PT FOLLOW UP: Yes  Activity Tolerance  Activity Tolerance: Patient limited by endurance; Patient limited by fatigue  Activity Tolerance: Vitals: 112/54, 95% SpO2, and 87 BPM     Patient Diagnosis(es): There were no encounter diagnoses.      has a past medical history of Atherosclerosis of native artery of right lower extremity with rest pain ([address] Back home wound VAC prior to admission  Response To Previous Treatment: Patient with no complaints from previous session. Family / Caregiver Present: No  Referring Practitioner: Dr. Qing Clemente (order for PT re-evaluation on 21)  Subjective  Subjective: Pt agreeable to work with PT/OT, resting in bed on therapy entry. Agreeable to PT and OT co-tx  General Comment  Comments: RN cleared pt for therapy session  Pain Screening  Patient Currently in Pain: Yes  Pain Assessment  Pain Assessment: 0-10  Pain Level: 9  Pain Type: Chronic pain  Pain Location: Back  Pain Orientation: Lower  Non-Pharmaceutical Pain Intervention(s): Repositioned; Ambulation/Increased Activity; Therapeutic presence  Vital Signs  Patient Currently in Pain: Yes       Orientation  Orientation  Overall Orientation Status: Within Normal Limits       Objective   Bed mobility  Supine to Sit: Stand by assistance(VC's for sternal precautions, additional time needed)  Sit to Supine: Moderate assistance(for BLE)  Scootin Person assistance;Maximal assistance(with bridging technique, to scoot to [facility])  Transfers  Sit to Stand: Minimal Assistance  Stand to sit: Minimal Assistance  Bed to Chair: Minimal assistance(with 2XE)  Ambulation  Ambulation?: Yes  Ambulation 1  Surface: level tile  Device: Rollator  Assistance: Minimal assistance  Quality of Gait: slow pace, flexed posture, verbal cues to keep base of support within walker frame, unsteady gait.  cueing to correct, small steps with increased unsteadiness during turns  Gait Deviations: Slow Lilia;Decreased step length;Decreased step height  Distance: 15 ft pl;us 20 ft plus 75 ft     Balance  Posture: Fair  Sitting - Static: Fair  Sitting - Dynamic: Fair  Standing - Static: Fair;-  Standing - Dynamic: Fair;-  Comments: Pt req grossly min A for dynamic standing balance, increased unsteadiness with movement and prolonged standing d/t dizziness  Exercises  Quad Sets: x 15 B  Heelslides: x 15 B  Gluteal Sets: x 15 B  Hip Abduction: 1 x 15  Knee Long Arc Quad: 1 x 15  Ankle Pumps: 1 x 15                          AM-PAC Score  AM-PAC Inpatient Mobility Raw Score : 15 (05/10/21 1505)  AM-PAC Inpatient T-Scale Score : 39.45 (05/10/21 1505)  Mobility Inpatient CMS 0-100% Score: 57.7 (05/10/21 1505)  Mobility Inpatient CMS G-Code Modifier : CK (05/10/21 1505)          Goals  Short term goals  Time Frame for Short term goals: 1 week (5/13/21) unless otherwise specified  Short term goal 1: Pt will transfer supine <-> sit with Rose Marie x 1; 5/10 supine to sit with SBA and sit to supine with mod A  Short term goal 2: Pt will transfer sit <-> stand and bed>chair using RW with supervision; Rose Marie with RW on 5/10  Short term goal 3: Pt will ambulate x 25 feet using RW with supervision (this is the max distance pt walks at baseline); 5/10 75 ft with Rose Marie with RW  Short term goal 4: By 5/08/21: Pt will participate in 12-15 reps BLE exercise for ROM/strengthening and endurance; 5/10 met, cont  Patient Goals   Patient goals : \"To get up and walk and hopefully improve my walking ability. \"    Plan    Plan  Times per week: 5-7x/week in acute care  Times per day: Daily  Specific instructions for Next Treatment: Progress ther ex and mobility as tolerated, assess stair amb prior to D/C (if returning home)  Current Treatment Recommendations: Strengthening, Transfer Training, Endurance Training, Gait Training, Functional Mobility Training, Safety Education & Training, Home Exercise Program, Pain Management, Balance Training, Stair training, Equipment Evaluation, Education, & procurement, Patient/Caregiver Education & Training, Positioning  Plan Comment: Progress mobility as tolerated  Safety Devices  Type of devices:  All fall risk precautions in place, Gait belt, Patient at risk for falls, Nurse notified, Call light within reach, Bed alarm in place, Left in bed     Therapy Time   Individual Concurrent Group Co-treatment   Time In 0957         Time Out Alfonso Colmenares         Timed Code Treatment Minutes: 26 Minutes    If pt is discharged prior to next therapy session, this note will serve as discharge summary.     King Kc, PT

## 2021-05-10 NOTE — DISCHARGE SUMMARY
Cardiac, Vascular & Thoracic Surgery  Discharge Summary    Patient:  Demario Kitchen 1950 [id number]   Admission Date:  4/24/2021 12:33 AM  Discharge Date: 5/10/21      Principle Diagnosis:  NSTEMI (non-ST elevated myocardial infarction) [facility])    Secondary Diagnosis:  Principal Problem:    NSTEMI (non-ST elevated myocardial infarction) ([facility]  Active Problems:    GERD (gastroesophageal reflux disease)    History of tobacco use    Hyponatremia    Anemia    Hypokalemia    Coronary artery disease    Essential hypertension    Diabetes mellitus ([facility]    Sacral wound  Resolved Problems:    * No resolved hospital problems. *    LHC: 4/26/21 with Dr. Virgen Yung   Findings:  1. Left main coronary artery distal 70%. It gave off the left anterior descending artery and left circumflex.     2. Left anterior descending artery has severe atherosclerotic disease.  70% ostial. It was moderate in size. It gave off septal perforators and a moderate sized diagonal branch. The LAD covered the entire apex of the left ventricle.      3. Left circumflex has severe atherosclerotic disease. 99% ostial. It was moderate in size. There was a moderate sized obtuse marginal branch.              ~OM1      4. Right coronary artery has severe atherosclerotic disease. It was moderate in size and was the dominant artery.             ~100% distal PDA      5. Left ventriculogram showed normal LVEF at 50%. Wall motion was normal . There was no significant mitral valve or aortic valve disease noted. LVEDP was normal. There was no gradient noted across the aortic valve during pullback of the catheter.     Procedure: 5/3/2021 Urgent coronary bypass grafting surgery x3 with single greater saphenous vein graft to the posterior ventricular branch of the right coronary artery, separate single greater saphenous vein graft to the second obtuse marginal branch of circumflex, pedicled left internal artery to the LAD.   Left atrial appendage obliteration with 40 mm AtriCure left atrial  Clip. Cardiopulmonary bypass. Endoscopic vein harvest of the left greater saphenous vein. Transesophageal echo. Epiaortic ultrasound. Doppler verification of grafts. Bilateral five-level intercostal nerve block with Exparel. Platelet gel application. Sternal plating. 5/5/2021 Mediastinal exploration and evacuation of hematoma. History:  The patient is a 72 y.o. female with significant past medical history of COPD, bronchiectasis, tracheomalacia, osteoporosis (multiple fractures), GERD, DANIELLE (not using CPAP regularly), RA,  chronic back pain, currently being treated at [facility] for stage 4 sacral pressure ulcer (treated by Dr. Jack Yin at [facility]) and T2DM who presented to [facility] ED after a syncopal episode. During her work up in the ED she was found to have elevated troponin levels (0.59) and her EKG showed lateral depression (NSTEMI). She was transferred to [facility] for further evaluation and treatment as well as a Cardiology consult. Her cardiac catheterization revealed multivessel CAD. We have been consulted for surgical revascularization. Hospital Course: The post operative period for this patient was complicated by post-op bleeding and the need to go back to the OR on 5/5 for a mediastinal exploration and evacuation of hematoma. She had some confusion after she had anesthesia (each time) which eventually resolved. She is completely alert and oriented at the time of discharge. The patient was discharged on 5/10/2021 and will follow up with Dr. Arabella Irving on 5/18/21 and cardiology in 1-2 weeks. She will resume care for her chronic sacral wound with wound care clinic at [facility]. She is in a pain contract and reports having enough percocet  at home (has been in hospital for 16 days) that she is not getting any additional pain medication at time of discharge. She is at her pre-op weight and will not be discharging home on any diuresis.  As her BP has tended to be on the softer side, her home torsemide has been stopped as well until she follow up with Dr. Janis Thomas in one week. She is aware that if she has SOB or starts swelling in LE's to call the office right away. Discharged Condition: stable    Disposition:  Home with [place] and PT/OT    Medications:  Aspirin:continue  ADP Inhibitor (plavix/brillinta/effient):start  ACE/ARB:start, dose had to be lowered to tolerate  Betablocker:decrease  Statin:start    Discharge Medications:   Elke Herring   Home Medication Instructions F:[id number]    Printed on:05/10/21 2286   Medication Information                      ACCU-CHEK CEDRICK PLUS strip  TEST 4 TIMES DAILY             albuterol sulfate  (90 Base) MCG/ACT inhaler  INHALE 2 PUFFS INTO THE LUNGS EVERY 4 HOURS AS NEEDED FOR WHEEZING             aspirin EC 81 MG EC tablet  Take 1 tablet by mouth daily             atorvastatin (LIPITOR) 40 MG tablet  Take 40 mg by mouth             azithromycin (ZITHROMAX) 250 MG tablet  TAKE 1 TABLET BY MOUTH EVERY DAY             calcium carbonate (OSCAL) 500 MG TABS tablet  Take 500 mg by mouth daily             cetirizine (ZYRTEC) 10 MG tablet  Take 10 mg by mouth daily. clopidogrel (PLAVIX) 75 MG tablet  Take 1 tablet by mouth daily             cyclobenzaprine (FLEXERIL) 10 MG tablet  Take 10 mg by mouth 2 times daily as needed              docusate sodium (COLACE) 100 MG capsule  Take 100 mg by mouth 2 times daily as needed for Constipation             DULoxetine (CYMBALTA) 60 MG extended release capsule  Take 60 mg by mouth daily             etanercept (ENBREL) 50 MG/ML injection  Inject 50 mg into the skin every 7 days. fluticasone (FLONASE) 50 MCG/ACT nasal spray  INHALE 2 SPRAYS IN EACH NOSTRIL DAILY             gabapentin (NEURONTIN) 300 MG capsule  Take 300 mg by mouth 2 times daily.              insulin glargine (LANTUS) 100 UNIT/ML injection vial  Inject 15 Units into the skin nightly             insulin lispro (HUMALOG) 100 UNIT/ML injection vial  Inject 0-12 Units into the skin 3 times daily (with meals)             Insulin Syringe-Needle U-100 31G X 5/16\" 0.5 ML MISC  USE 5 TIMES DAILY             ipratropium (ATROVENT) 0.06 % nasal spray  USE 2 SPRAYS BY NASAL ROUTE 2-4 TIMES DAILY             latanoprost (XALATAN) 0.005 % ophthalmic solution  Place 1 drop into both eyes nightly             losartan (COZAAR) 25 MG tablet  Take 0.5 tablets by mouth daily Hold this medication for systolic BP <279. meloxicam (MOBIC) 15 MG tablet  Patient states taking only as needed             metoprolol tartrate (LOPRESSOR) 25 MG tablet  Take 0.5 tablets by mouth 2 times daily Hold this medication for pulse <78 or systolic BP <916             metroNIDAZOLE (FLAGYL) 250 MG tablet  Crush one tablet into a fine powder, sprinkle in wound three times per week with dressing changes, as needed for malodor. Misc. Devices (ACAPELLA) MISC  Take 1 Device by mouth as needed             morphine (MS CONTIN) 15 MG extended release tablet  15 mg 2 times daily. NARCAN 4 MG/0.1ML LIQD nasal spray  PLEASE SEE ATTACHED FOR DETAILED DIRECTIONS             omeprazole (PRILOSEC) 40 MG capsule  Take 40 mg by mouth daily              oxyCODONE-acetaminophen (PERCOCET)  MG per tablet  Take 1 tablet by mouth 2 times daily. polyethylene glycol (GLYCOLAX) 17 g packet  Take 17 g by mouth daily as needed for Constipation             Roflumilast (DALIRESP) 500 MCG tablet  Take 1 tablet by mouth daily             Teriparatide, Recombinant, (FORTEO) 600 MCG/2.4ML SOPN injection  Inject 20 mcg into the skin daily             vitamin D (CHOLECALCIFEROL) 1000 UNIT TABS tablet  Take 5,000 Units by mouth daily                  Patient Instructions:   Activity: DO NOT LIFT, PUSH, OR PULL ANYTHING OVER 5 POUNDS FOR 6 WEEK from the day of surgery  Diet:  cardiac diet  Wound Care:  [address] YOUR INCISIONS DAILY WITH A CLEAN WASHCLOTH AND ANTIBACTERIAL SOAP. Do not wash your incisions after you have cleansed other parts of your body    Follow up with Cardiothoracic Surgeon, Dr. Shyam Falcon on 5/18/21 at 11:00 am.    Follow up with Cardiologist, Dr. Heather Cruz in 1-2 weeks. LORENZO Crawford-CNP   Agree with note.       Chapis Diegor, MD, FACS, [facility], FACANA, [place]

## 2021-05-10 NOTE — PROGRESS NOTES
Right IJ removed without complication in accordance with hospital policy. Dry sterile dressing applied over vaseline permeated dressing applied to access site. Will continue to monitor.

## 2021-05-10 NOTE — CARE COORDINATION
Plan for dc home today. [employer] providing continuation of wound vac. Call placed to notify them of patient discharge. LM for call back from [employer] ([employer]). ADDENDUM[de-identified] Call back from [employer]) Ashley Weinstein liaedward). Nothing further needed to resume care. [facility] [place] [facility]. Will fax orders when completed.       Lindsey Sutton RN

## 2021-05-10 NOTE — PROGRESS NOTES
As instructed prior to discharge, removed VAC dressing and placed moist-moist sterile dressing to sacral wound. Pt verbalized understanding to follow up with wound care specialist as scheduled previously and that the home health care nurse will be applying previously ordered wound vac.

## 2021-05-10 NOTE — PROGRESS NOTES
Spoke with Dr. Joelle Lara office about follow up appointment. Appointment made for 5/18 at 11am at the [address] [place] [address] office, [address].

## 2021-05-10 NOTE — DISCHARGE INSTR - COC
MEDIASTINOSCOPY N/A 5/5/2021    MEDIASTINAL EXPLORATION AND EVACUATION OF HEMATOMA performed by Saad Martin MD at  [address]  01/08/2021    sacral wound debridement    PRESSURE ULCER DEBRIDEMENT N/A 1/8/2021    SACRAL WOUND DEBRIDEMENT performed by Herb Felix MD at [facility] SEPTOPLASTY  5/7/2013    FESS with balloon    SPINAL FUSION      TUBAL LIGATION      UPPER GASTROINTESTINAL ENDOSCOPY  4/8/2014    Dilitation       Immunization History:   Immunization History   Administered Date(s) Administered    COVID-19, Pfizer, PF, 30mcg/0.3mL 02/11/2021, 03/04/2021    Influenza, High Dose (Fluzone 65 yrs and older) 11/22/2017, 11/08/2018    PPD Test 12/02/2014    Pneumococcal Conjugate 13-valent ([id number]) 11/26/2013    Pneumococcal Conjugate 7-valent (Prevnar7) 11/26/2013    Pneumococcal Polysaccharide ([id number]) 10/23/2017    Td Vaccine >8yo [facility] 01/01/2007       Active Problems:  Patient Active Problem List   Diagnosis Code    Rheumatoid arthritis ([facility] M06.9    Psoriasis L40.9    GERD (gastroesophageal reflux disease) K21.9    History of tobacco use Z87.891    Hyponatremia E87.1    Anemia D64.9    Cylindrical bronchiectasis (HCC) J47.9    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    Immunocompromised state ([facility] D84.9    Hypokalemia E87.6    Coronary artery disease I25.10    Stasis edema of both lower extremities I87.303    Essential hypertension I10    Lumbar spondylosis M47.816    Mitral valve insufficiency and aortic valve insufficiency I08.0    Mixed hyperlipidemia E78.2    Myopia of both eyes H52.13    Osteoporosis M81.0    Other chronic sinusitis J32.8    Primary open angle glaucoma (POAG) of both eyes, mild stage H40.1131    Primary osteoarthritis of right hip M16.11    Diabetes mellitus (HCC) E11.9    Type 2 diabetes mellitus with unspecified diabetic retinopathy without macular edema (HCC) E11.319    Uncontrolled type 2 diabetes mellitus with diabetic peripheral angiopathy without gangrene, with long-term current use of insulin ([facility]) E11.51, E11.65, Z79.4    Pressure ulcer of coccygeal region, stage 4 ([facility]) L89.154    NSTEMI (non-ST elevated myocardial infarction) ([facility] I21.4    Sacral wound S31.000A       Isolation/Infection:   Isolation            No Isolation          Patient Infection Status       Infection Onset Added Last Indicated Last Indicated By Review Planned Expiration Resolved Resolved By    None active    Resolved    COVID-19 Rule Out 04/23/21 04/23/21 04/23/21 COVID-19, Rapid (Ordered)   04/23/21 Rule-Out Test Resulted    COVID-19 Rule Out 01/04/21 01/04/21 01/04/21 COVID-19 (Ordered)   01/05/21 Rule-Out Test Resulted    COVID-19 Rule Out 06/28/20 06/28/20 06/28/20 COVID-19 (Ordered)   06/29/20 Rule-Out Test Resulted            Nurse Assessment:  Last Vital Signs: BP (!) 97/50   Pulse 93   Temp 98.3 °F (36.8 °C) (Oral)   Resp 20   Ht 5' 10\" (1.778 m)   Wt 160 lb 7.9 oz (72.8 kg)   SpO2 99%   BMI 23.03 kg/m²     Last documented pain score (0-10 scale): Pain Level: 9  Last Weight:   Wt Readings from Last 1 Encounters:   05/10/21 160 lb 7.9 oz (72.8 kg)     Mental Status:  oriented    IV Access:  - None    Nursing Mobility/ADLs:  Walking   Assisted  Transfer  Assisted  Bathing  Assisted  Dressing  Assisted  Toileting  Assisted  Feeding  Independent  Med Admin  Independent  Med Delivery   whole    Wound Care Documentation and Therapy:  Negative Pressure Wound Therapy Sacrum (Active)   $ Standard NPWT <=50 sq cm PER TX $ Yes 05/04/21 1019   $ Standard NPWT >50 sq cm PER TX $ Yes 05/06/21 0950   Wound Type Pressure ulcer: Stage IV 05/10/21 0400   Unit Type KCI 05/10/21 0400   Dressing Type Black foam;Veraflo 05/10/21 0400   Number of pieces used 1 05/10/21 0400   Cycle Continuous 05/10/21 0400   Target Pressure (mmHg) 125 05/10/21 0400   Intensity 5 05/10/21 0400   Irrigation Solution Sodium chloride 0.9% 05/09/21 2000 Assessment Maceration 05/06/21 0952   Margins Defined edges; Unattached edges 05/06/21 4552   Wound Thickness Description not for Pressure Injury Full thickness 05/06/21 0952   Number of days: 142     Sacrum:        Wound Care Plan: NPWT to sacrum; frame with drape; 1 black foam; track to L hip; cover with drape; attach tracker pad. Change dressing 3 times a week. Pt [facility] with Dr. Henrietta Norris for follow up care. Elimination:  Continence:   · Bowel: Yes  · Bladder: Yes  Urinary Catheter: None   Colostomy/Ileostomy/Ileal Conduit: n/a       Date of Last BM: 5/10/2021    Intake/Output Summary (Last 24 hours) at 5/10/2021 0944  Last data filed at 5/10/2021 0909  Gross per 24 hour   Intake 970 ml   Output 240 ml   Net 730 ml     I/O last 3 completed shifts: In: 36 [P.O.:720; I.V.:10]  Out: 240 [Urine:240]    Safety Concerns: At Risk for Falls    Impairments/Disabilities:      None    Nutrition Therapy:  Current Nutrition Therapy:   - Oral Diet:  Carb Control 4 carbs/meal (1800kcals/day)    Routes of Feeding: Oral  Liquids: No Restrictions  Daily Fluid Restriction: yes - amount 1500 ml  Last Modified Barium Swallow with Video (Video Swallowing Test): not done    Treatments at the Time of Hospital Discharge:   Respiratory Treatments: n/a  Oxygen Therapy:  is not on home oxygen therapy. Ventilator:    - No ventilator support    Rehab Therapies: {THERAPEUTIC INTERVENTION:[id number]}  Weight Bearing Status/Restrictions: No lifting, pushing, pulling more than 5 lbs.   Other Medical Equipment (for information only, NOT a DME order):  walker  Other Treatments: n/a    Patient's personal belongings (please select all that are sent with patient):  None    RN SIGNATURE:  Electronically signed by Mirtha Freitas RN on 5/10/21 at 3:22 PM EDT    CASE MANAGEMENT/SOCIAL WORK SECTION    Inpatient Status Date:4/24/21  Readmission Risk Assessment Score:  Readmission Risk              Risk of Unplanned

## 2021-05-10 NOTE — PROGRESS NOTES
CVTS Cardiothoracic Progress Note:                                CC:  Post op follow up     Surgery: 5/3/2021 Urgent coronary bypass grafting surgery x3 with single greater saphenous vein graft to the posterior ventricular branch of the right coronary artery, separate single greater saphenous vein graft to the second obtuse marginal branch of circumflex, pedicled left internal artery to the LAD. Left atrial appendage obliteration with 40 mm AtriCure left atrial  Clip. Cardiopulmonary bypass. Endoscopic vein harvest of the left greater saphenous vein. Transesophageal echo. Epiaortic ultrasound. Doppler verification of grafts. Bilateral five-level intercostal nerve block with Exparel. Platelet gel application. Sternal plating. Hospital course:   5/4/21 sitting up in bed, intermittently confused but easily reoriented. Appears comfortable, remains SR, and 96% on RA  5/5 sitting up in bed, reports feeling uncomfortable in bed. Easily conversational but now with increased O2 requirement 91% on 4 liters of oxygen. 5/6 was taken back to OR yesterday for mediastinal exploration with evacuation of hematoma. CXR this AM much improved. Pt still with intermittent confusion after having anesthesia again. 5/7 sitting up in bed eating breakfast. A&O reports that she is feeling better. 5/10 sitting up in chair, A&O, talkative, reports feeling well.      Subjective:   Dietary Intake: good    no Nausea   Pain Control: controlled  Complaints: mild post op pain  Bowels: have moved, most recently 8/11    Past Medical History:   Diagnosis Date    Atherosclerosis of native artery of right lower extremity with rest pain ([address] 07/25/2017    Back pain     Branch retinal vein occlusion 07/20/2012    Bronchiectasis with acute exacerbation (HCC)     Closed compression fracture of thoracic vertebra ([address] 01/15/2020    Closed fracture of facial bone with routine healing 11/21/2016    Closed jaw fracture ([address] 01/15/2020    FRACTURE SURGERY  02/2020    MEDIASTINOSCOPY N/A 5/5/2021    MEDIASTINAL EXPLORATION AND EVACUATION OF HEMATOMA performed by Ibis Decker MD at [address]  01/08/2021    sacral wound debridement    PRESSURE ULCER DEBRIDEMENT N/A 1/8/2021    SACRAL WOUND DEBRIDEMENT performed by Franco Lindsay MD at [address] SEPTOPLASTY  5/7/2013    FESS with balloon    SPINAL FUSION      TUBAL LIGATION      UPPER GASTROINTESTINAL ENDOSCOPY  4/8/2014    Dilitation        Allergies as of 04/23/2021 - Review Complete 04/23/2021   Allergen Reaction Noted    Atenolol  02/23/2014        Patient Active Problem List   Diagnosis    Rheumatoid arthritis ([address]    Psoriasis    GERD (gastroesophageal reflux disease)    History of tobacco use    Hyponatremia    Anemia    Cylindrical bronchiectasis ([address]    Tracheobronchomalacia    Immunocompromised state ([address]    Hypokalemia    Coronary artery disease    Stasis edema of both lower extremities    Essential hypertension    Lumbar spondylosis    Mitral valve insufficiency and aortic valve insufficiency    Mixed hyperlipidemia    Myopia of both eyes    Osteoporosis    Other chronic sinusitis    Primary open angle glaucoma (POAG) of both eyes, mild stage    Primary osteoarthritis of right hip    Diabetes mellitus ([address]    Type 2 diabetes mellitus with unspecified diabetic retinopathy without macular edema ([address]    Uncontrolled type 2 diabetes mellitus with diabetic peripheral angiopathy without gangrene, with long-term current use of insulin (HCC)    Pressure ulcer of coccygeal region, stage 4 ([address]    NSTEMI (non-ST elevated myocardial infarction) (HCC)    Sacral wound        Vital Signs: BP (!) 129/54   Pulse 88   Temp 98.3 °F (36.8 °C) (Oral)   Resp 20   Ht 5' 10\" (1.778 m)   Wt 160 lb 7.9 oz (72.8 kg)   SpO2 100%   BMI 23.03 kg/m²  O2 Flow Rate (L/min): 2 L/min     Admission Weight: Weight: 164 lb 1.6 oz (74.4 kg) Weight on 5/3 (72.6kg) Pre-op   Weight on 5/4 not measured   5/5  78.7 kg  5/6  76 kg  5/7  74.8 kg  5/8  72.6 kg  5/9  72.1 kg  5/10  72.8 kg    Intake/Output:     Intake/Output Summary (Last 24 hours) at 5/10/2021 1155  Last data filed at 5/10/2021 0909  Gross per 24 hour   Intake 970 ml   Output 240 ml   Net 730 ml      Extubation Time: 5/3/21 @ 12:42   Transition Time: 5/5 @ 23:01     LABORATORY DATA:   CBC:   Recent Labs     05/08/21  0501 05/09/21  0415 05/10/21  0414   WBC 6.3 5.5 5.9   HGB 8.9* 8.3* 8.5*   HCT 26.4* 24.7* 25.6*   MCV 89.4 88.8 90.5    188 206     BMP:   Recent Labs     05/08/21  0501 05/09/21  0415 05/10/21  0414   * 132* 133*   K 3.9 3.8 4.2   CL 96* 97* 100   CO2 28 28 25   BUN 15 14 11   CREATININE 0.8 0.7 0.7     MG:    Recent Labs     05/08/21  0501 05/09/21  0415 05/10/21  0414   MG 1.80 2.00 1.90      CXR: 5/3/21   Impression   Supportive devices project in normal positions.       No pneumothorax.       Vascular congestion.  Bibasilar atelectasis. CXR: 5/4/21   Impression   1. 14 cm left upper lobe soft tissue mass, possibly reflecting a hematoma. CT of the chest recommended for further evaluation. 2. CHF with mild pulmonary edema and or superimposed pneumonia. CXR: 5/5/21  Impression   Increasing bilateral perihilar airspace disease as compared to prior. Bibasilar pleuroparenchymal disease and pleuroparenchymal opacity at the left   apex are not markedly changed. ____________________________________________________    Objective:   General appearance: awake, interactive, in NAD  Lungs: CTAB   Heart: S1S2 normal; SR on monitor, HR in the 80's  Chest: symmetrical expansion with inspiration and expirations; no rocking of sternum noted   Abdomen: soft, non-tender  Bowel sounds: normoactive  Kidneys: UOP satisfactory in 24 hrs; Cr 0.7  Wound/Incisions: Midsternal incision CDI; RLE incisions with dressings CDI;  Pacing wires removed 5/6  Extremities: BLE pulses palpable; minimal tibial swelling noted   Neurological: awake and alert, was confused overnight  Chest tubes/Drains: Chest tubes removed. Chronic wound vac therapy on sacrum. Assessment:   Post-op: 7 days. Condition: In stable condition. Plan:   1. Cardiovascular: s/p CABG- BB, ASA, Statin, Plavix, ARB   Decreased ARB over weekend, BP improved. 2. Pulmonary: needs expansion- IS, acapella, OOBTC, ambulation. 3. Neurology: analgesia as needed - hx of chronic back pain    4. Nephrology: diurese as tolerated; back to pre-op weight. Will have her resume her h ome torsemide at time of d/c.     5. Endocrinology: BG better after increasing Lantus to 15 units last week. Will decrease her home dose of lantus and continue her SSI. 6. Hematology: acute blood loss anemia; H&H stable. 7. Microbiology: nothing at this time    8. Nutrition: cardiac diet  Constipation: resolved, had multiple BM's. Continue bowel regimen (vanita while on narcotics). 9. Labs: Reviewed as above    10. Post-op Drains/Wires:   Wound vacuum dressing changed to wet to dry as pt is discharging home today. Case mgt will have home care visit tomorrow to replace her chronic wound vacuum. 11. D/C Goals: DCP following, Home with family and HHC today. Meds:    The patient is on a beta-blocker   The patient is on an ace-i/ARB   The patient is on a statin   The patient is on oral antiplatelet therapy   ____________________________________________________    Kathleen Epps CNP  5/10/2021  11:55 AM  Note reviewed, events of last 24 hours reviewed along with vital signs and I/Os and patient examined. Assessment and plans discussed and are as outlined above.      Aviva Hammans, MD, FACS, [facility], [facility], [place]

## 2021-05-10 NOTE — PROGRESS NOTES
Spoke to primary RN, Timi Toribio pt to be discharged today to home; Timi Toribio to remove VAC dressing and place moist-moist dressing; Timi Toribio to speak with Case Management to have home care visit tomorrow to place NPWT to sacrum. AUTUMN updated.

## 2021-05-17 NOTE — OP NOTE
[facility]                                OPERATIVE REPORT    PATIENT NAME: Bishop Blair                    :        1950  MED REC NO:   [id number]                          ROOM:       [id number]  ACCOUNT NO:   [de-identified]                           ADMIT DATE: 2021  PROVIDER:     Lilly Pickard MD    DATE OF PROCEDURE:  2021    PREOPERATIVE DIAGNOSES:  1. Coronary artery disease. 2.  Rheumatoid arthritis. 3.  Hyperlipidemia. 4.  Type 2 diabetes. 5.  Non-ST MI.    POSTOPERATIVE DIAGNOSES:  1. Coronary artery disease. 2.  Rheumatoid arthritis. 3.  Hyperlipidemia. 4.  Type 2 diabetes. 5.  Non-ST MI.    OPERATION PERFORMED:  Mediastinal exploration and evacuation of  hematoma. STAFF SURGEON:  Lilly Pickard MD    ANESTHESIA:  General.    FIRST ASSISTANT:  Stefani Curry    COMPLICATIONS:  None immediate. ESTIMATED BLOOD LOSS:  7166 mL, complication is bleeding. OPERATIVE PROCEDURE:  The patient was taken to the operating room postop  day#1 after coronary artery bypass, intubated, stable. The patient was  prepped and draped in a standard fashion. Ports for the course were  done in a standard manner. Mediastinal incision was opened. Wire was  taken out. Previously made sternotomy was opened by using a retractor. Significant amount of clots was identified in the left chest roughly  about 1000 mL between clots and free blood. Looked dark. Once the  whole mediastinum and chest was packed, all the vacuum was removed one  by one to explore for bleeding. There was some generalized oozing. There was no specific bleeding could be identified. All grafts and  distal-proximal was checked. All cannula sites were checked. Mammary  site was checked. There was no active bleeding could be identified.    Once copious amount of irrigation was done, all the clots and _____ left  over blood was removed. Chest tube was cleared and sternotomy was  closed by using five stainless steel wires. Soft tissue and skin were  closed. The patient was dispositioned to the recovery room in stable  condition without any complication.         Willis Valdez MD    D: 05/17/2021 8:11:19       T: 05/17/2021 11:12:14     [email or web address]  Job#: [id number]     Doc#: [id number]    CC:

## 2021-05-18 ENCOUNTER — OFFICE VISIT (OUTPATIENT)
Dept: CARDIOTHORACIC SURGERY | Age: 71
End: 2021-05-18

## 2021-05-18 ENCOUNTER — TELEPHONE (OUTPATIENT)
Dept: PULMONOLOGY | Age: 71
End: 2021-05-18

## 2021-05-18 ENCOUNTER — VIRTUAL VISIT (OUTPATIENT)
Dept: PULMONOLOGY | Age: 71
End: 2021-05-18
Payer: MEDICARE

## 2021-05-18 VITALS
WEIGHT: 164 LBS | BODY MASS INDEX: 23.48 KG/M2 | SYSTOLIC BLOOD PRESSURE: 116 MMHG | HEART RATE: 76 BPM | TEMPERATURE: 97.9 F | OXYGEN SATURATION: 98 % | DIASTOLIC BLOOD PRESSURE: 60 MMHG | HEIGHT: 70 IN

## 2021-05-18 DIAGNOSIS — I10 ESSENTIAL HYPERTENSION: ICD-10-CM

## 2021-05-18 DIAGNOSIS — I25.10 CORONARY ARTERY DISEASE INVOLVING NATIVE CORONARY ARTERY OF NATIVE HEART WITHOUT ANGINA PECTORIS: ICD-10-CM

## 2021-05-18 DIAGNOSIS — J47.9 BRONCHIECTASIS WITHOUT COMPLICATION (HCC): Primary | ICD-10-CM

## 2021-05-18 DIAGNOSIS — Z48.812 AFTERCARE FOLLOWING SURGERY OF THE CIRCULATORY SYSTEM: Primary | ICD-10-CM

## 2021-05-18 PROCEDURE — 99214 OFFICE O/P EST MOD 30 MIN: CPT | Performed by: INTERNAL MEDICINE

## 2021-05-18 PROCEDURE — 99024 POSTOP FOLLOW-UP VISIT: CPT | Performed by: THORACIC SURGERY (CARDIOTHORACIC VASCULAR SURGERY)

## 2021-05-18 RX ORDER — PREDNISONE 1 MG/1
4 TABLET ORAL DAILY
Status: ON HOLD | COMMUNITY
End: 2021-12-28 | Stop reason: HOSPADM

## 2021-05-18 ASSESSMENT — SLEEP AND FATIGUE QUESTIONNAIRES
ESS TOTAL SCORE: 9
HOW LIKELY ARE YOU TO NOD OFF OR FALL ASLEEP IN A CAR, WHILE STOPPED FOR A FEW MINUTES IN TRAFFIC: 0
HOW LIKELY ARE YOU TO NOD OFF OR FALL ASLEEP WHILE LYING DOWN TO REST IN THE AFTERNOON WHEN CIRCUMSTANCES PERMIT: 2
HOW LIKELY ARE YOU TO NOD OFF OR FALL ASLEEP WHILE SITTING AND TALKING TO SOMEONE: 0
HOW LIKELY ARE YOU TO NOD OFF OR FALL ASLEEP WHILE WATCHING TV: 2

## 2021-05-18 NOTE — TELEPHONE ENCOUNTER
Call patient to scheduled f/u. Plan:   · Continue azithromycin daily  · Continue Daliresp; off Singulair  -- Failed Singulair, Failed Dulera, Failed Spiriva, Failed Advair. · I recommended regular use of CPAP. We will see if we can get this set back up.    · See me in 6 months - please call patient this afternoon to schedule

## 2021-05-18 NOTE — TELEPHONE ENCOUNTER
Within this Telehealth Consent, the terms you and yours refer to the person using the Telehealth Service (Service), or in the case of a use of the Service by or on behalf of a minor, you and yours refer to and include (i) the parent or legal guardian who provides consent to the use of the Service by such minor or uses the Service on behalf of such minor, and (ii) the minor for whom consent is being provided or on whose behalf the Service is being utilized. When using Service, you will be consulting with your health care providers via the use of Telehealth.   Telehealth involves the delivery of healthcare services using electronic communications, information technology or other means between a healthcare provider and a patient who are not in the same physical location. Telehealth may be used for diagnosis, treatment, follow-up and/or patient education, and may include, but is not limited to, one or more of the following:    Electronic transmission of medical records, photo images, personal health information or other data between a patient and a healthcare provider    Interactions between a patient and healthcare provider via audio, video and/or data communications    Use of output data from medical devices, sound and video files    Anticipated Benefits   The use of Telehealth by your Provider(s) through the Service may have the following possible benefits:    Making it easier and more efficient for you to access medical care and treatment for the conditions treated by such Provider(s) utilizing the Service    Allowing you to obtain medical care and treatment by Provider(s) at times that are convenient for you    Enabling you to interact with Provider(s) without the necessity of an in-office appointment     Possible Risks   While the use of Telehealth can provide potential benefits for you, there are also potential risks associated with the use of Telehealth.  These risks include, but may not be limited to the following:    Your Provider(s) may not able to provide medical treatment for your particular condition and you may be required to seek alternative healthcare or emergency care services.  The electronic systems or other security protocols or safeguards used in the Service could fail, causing a breach of privacy of your medical or other information.  Given regulatory requirements in certain jurisdictions, your Provider(s) diagnosis and/or treatment options, especially pertaining to certain prescriptions, may be limited. Acceptance   1. You understand that Services will be provided via Telehealth. This process involves the use of HIPAA compliant and secure, real-time audio-visual interfacing with a qualified and appropriately trained provider located at [facility]. 2. You understand that, under no circumstances, will this session be recorded. 3. You understand that the Provider(s) at [facility] and other clinical participants will be party to the information obtained during the Telehealth session in accordance with best medical practices. 4. You understand that the information obtained during the Telehealth session will be used to help determine the most appropriate treatment options. 5. You understand that You have the right to revoke this consent at any point in time. 6. You understand that Telehealth is voluntary, and that continued treatment is not dependent upon consent. 7. You understand that, in the event of non-consent to Telehealth services and/or technical difficulties, you will obtain services as typically provided in the absence of Telehealth technology. 8. You understand that this consent will be kept in Your medical record. 9. No potential benefits from the use of Telehealth or specific results can be guaranteed. Your condition may not be cured or improved and, in some cases, may get worse.    10. There are limitations in the provision of medical care and treatment via Telehealth and the Service and you may not be able to receive diagnosis and/or treatment through the Service for every condition for which you seek diagnosis and/or treatment. 11. There are potential risks to the use of Telehealth, including but not limited to the risks described in this Telehealth Consent. 12. Your Provider(s) have discussed the use of Telehealth and the Service with you, including the benefits and risks of such and you have provided oral consent to your Provider(s) for the use of Telehealth and the Service. 15. You understand that it is your duty to provide your Provider(s) truthful, accurate and complete information, including all relevant information regarding care that you may have received or may be receiving from other healthcare providers outside of the Service. 14. You understand that each of your Provider(s) may determine in his or sole discretion that your condition is not suitable for diagnosis and/or treatment using the Service, and that you may need to seek medical care and treatment a specialist or other healthcare provider, outside of the Service. 15. You understand that you are fully responsible for payment for all services provided by Provider(s) or through use of the Service and that you may not be able to use third-party insurance. 16. You represent that (a) you have read this Telehealth Consent carefully, (b) you understand the risks and benefits of the Service and the use of Telehealth in the medical care and treatment provided to you by Provider(s) using the Service, and (c) you have the legal capacity and authority to provide this consent for yourself and/or the minor for which you are consenting under applicable federal and state laws, including laws relating to the age of [de-identified] and/or parental/guardian consent.    17. You give your informed consent to the use of Telehealth by Provider(s) using the Service under the terms described in the Terms of Service and this Telehealth Consent. The patient was read the following statement and has consented to the visit as of 5/18/21. The patient has been scheduled for their first telehealth visit on 5/18/21 with Isi Moore.

## 2021-05-18 NOTE — PROGRESS NOTES
72%    Pertussis antibodies are positive    CT CHEST 4/23/21  Pulmonary Arteries: Pulmonary arteries are adequately opacified for   evaluation.  No evidence of intraluminal filling defect to suggest pulmonary   embolism.  Main pulmonary artery is normal in caliber.       Mediastinum: No evidence of mediastinal lymphadenopathy.  The heart and   pericardium demonstrate no acute abnormality.  There is no acute abnormality   of the thoracic aorta.       Lungs/pleura: Diffuse emphysematous changes including centrilobular and   paraseptal.  Mild patchy opacities are noted in the bilateral lower lungs   which could indicate an inflammatory/post inflammatory process.  Small area   of consolidation in the left perihilar region which may be concerning for   underlying airspace disease process.  No pleural effusion.       Upper Abdomen: Limited images of the upper abdomen are unremarkable.       Soft Tissues/Bones: Chronic compression fractures with evidence of prior   vertebroplasty involving the T12 and L1 vertebral bodies.  Otherwise no   evidence of an acute process in the soft tissues or skeletal structures.           Impression   No evidence of pulmonary embolism.       Diffuse emphysematous changes including centrilobular and paraseptal. Mild   patchy opacities are noted in the bilateral lower lungs which could indicate   an inflammatory/post inflammatory process.  Small area of consolidation in   the left perihilar region which may be concerning for underlying airspace   disease process. CPAP titration 8/20/19 CPAP 7  PSG 7/17/19 AHI 8, but AHI 46 with REM    CABG 5/3/2021 Urgent coronary bypass grafting surgery x3 with single greater saphenous vein graft to the posterior ventricular branch of the right coronary artery, separate single greater saphenous vein graft to the second obtuse marginal branch of circumflex, pedicled left internal artery to the LAD.  Left atrial appendage obliteration      Assessment: · COPD/Chronic bronchitis/cough with cylindrical bronchiectasis  · Centrilobular pulmonary emphysema  · Mild DANIELLE/REM related sleep disordered breathing  · CAD s/p CABG 5/3/21  · Tracheomalacia    · Lower extremity edema  · Pulmonary Nodules have resolved  · Rheumatoid arthritis on Embrel and MTX and 4 mg prednisone  · Positive tobacco history, 20 pack year quit in 1991  · Diabetes      Plan:      · Continue azithromycin daily  · Continue Daliresp; off Singulair  -- Failed Singulair, Failed Dulera, Failed Spiriva, Failed Advair. · I recommended regular use of CPAP. We will see if we can get this set back up. · See me in 6 months - please call patient this afternoon to schedule          Robert Ortiz is a 72 y.o. female being evaluated by a Virtual Visit (video visit) encounter to address concerns as mentioned above. A caregiver was present when appropriate. Due to this being a TeleHealth encounter (During [facility] [place] emergency), evaluation of the following organ systems was limited: Vitals/Constitutional/EENT/Resp/CV/GI//MS/Neuro/Skin/Heme-Lymph-Imm. Pursuant to the emergency declaration under the [address] [place] [address], [address] [place] [address] authority and the [employer] and [employer] Act, this Virtual Visit was conducted with patient's (and/or legal guardian's) consent, to reduce the patient's risk of exposure to COVID-19 and provide necessary medical care. The patient (and/or legal guardian) has also been advised to contact this office for worsening conditions or problems, and seek emergency medical treatment and/or call 911 if deemed necessary. Services were provided through a video synchronous discussion virtually to substitute for in-person clinic visit. Patient was located in her home, provider was located in his office. --Nati Narayanan MD on 5/18/2021 at 9:44 AM    An electronic signature was used to authenticate this note.

## 2021-05-19 ENCOUNTER — HOSPITAL ENCOUNTER (OUTPATIENT)
Dept: WOUND CARE | Age: 71
Discharge: HOME | End: 2021-05-19
Payer: MEDICARE

## 2021-05-19 VITALS
DIASTOLIC BLOOD PRESSURE: 75 MMHG | HEIGHT: 70 IN | SYSTOLIC BLOOD PRESSURE: 129 MMHG | RESPIRATION RATE: 18 BRPM | TEMPERATURE: 98.7 F | BODY MASS INDEX: 23.25 KG/M2 | WEIGHT: 162.4 LBS | HEART RATE: 103 BPM

## 2021-05-19 DIAGNOSIS — L89.154 PRESSURE ULCER OF COCCYGEAL REGION, STAGE 4 (HCC): Primary | ICD-10-CM

## 2021-05-19 PROCEDURE — 11042 DBRDMT SUBQ TIS 1ST 20SQCM/<: CPT

## 2021-05-19 PROCEDURE — 11042 DBRDMT SUBQ TIS 1ST 20SQCM/<: CPT | Performed by: INTERNAL MEDICINE

## 2021-05-19 RX ORDER — LIDOCAINE 40 MG/G
CREAM TOPICAL ONCE
Status: CANCELLED | OUTPATIENT
Start: 2021-05-19 | End: 2021-05-19

## 2021-05-19 RX ORDER — LIDOCAINE 40 MG/G
CREAM TOPICAL ONCE
Status: DISCONTINUED | OUTPATIENT
Start: 2021-05-19 | End: 2021-05-20 | Stop reason: HOSPADM

## 2021-05-19 RX ORDER — LIDOCAINE 50 MG/G
OINTMENT TOPICAL ONCE
Status: CANCELLED | OUTPATIENT
Start: 2021-05-19 | End: 2021-05-19

## 2021-05-19 RX ORDER — BACITRACIN ZINC AND POLYMYXIN B SULFATE 500; 1000 [USP'U]/G; [USP'U]/G
OINTMENT TOPICAL ONCE
Status: CANCELLED | OUTPATIENT
Start: 2021-05-19 | End: 2021-05-19

## 2021-05-19 RX ORDER — LIDOCAINE HYDROCHLORIDE 40 MG/ML
SOLUTION TOPICAL ONCE
Status: CANCELLED | OUTPATIENT
Start: 2021-05-19 | End: 2021-05-19

## 2021-05-19 RX ORDER — BACITRACIN ZINC AND POLYMYXIN B SULFATE 500; 1000 [USP'U]/G; [USP'U]/G
OINTMENT TOPICAL ONCE
Status: DISCONTINUED | OUTPATIENT
Start: 2021-05-19 | End: 2021-05-20 | Stop reason: HOSPADM

## 2021-05-19 ASSESSMENT — PAIN DESCRIPTION - PAIN TYPE: TYPE: CHRONIC PAIN

## 2021-05-19 ASSESSMENT — PAIN SCALES - GENERAL: PAINLEVEL_OUTOF10: 7

## 2021-05-19 ASSESSMENT — PAIN - FUNCTIONAL ASSESSMENT: PAIN_FUNCTIONAL_ASSESSMENT: ACTIVITIES ARE NOT PREVENTED

## 2021-05-19 ASSESSMENT — PAIN DESCRIPTION - PROGRESSION: CLINICAL_PROGRESSION: NOT CHANGED

## 2021-05-19 ASSESSMENT — PAIN DESCRIPTION - FREQUENCY: FREQUENCY: INTERMITTENT

## 2021-05-19 ASSESSMENT — PAIN DESCRIPTION - ONSET: ONSET: ON-GOING

## 2021-05-19 NOTE — PLAN OF CARE
Pt. Returning today after recent hospitalization & CABG. Pt. States she did have NPWT placed during hospitalization. Wound stable debridement per MD & pt. Tolerated well. Will hold NPWT this week d/t maegan-wound skin irritation. Will have patient change dressings every other day as ordered. Reinforced importance of cont. With good off-loading, frequent repositioning & increased protein in diet, pt. Verbalizes understanding. F/u in [facility] in 1 week as ordered, pt. Aware to call sooner with any questions/concerns. Discharge instructions reviewed with patient, all questions answered, copy given to patient. Dressings were applied to all wounds per M.D. Instructions at this visit.

## 2021-05-19 NOTE — PLAN OF CARE
[facility] Physician Orders and Discharge [address]  [address], [address]  [place], [facility]  Telephone: [phone]      Fax: [phone] home care company:   [facility]     Your wound-care supplies will be provided by: We are ordering your wound-care supplies from [employer]. Please note, depending on your insurance coverage, you may have out-of-pocket expenses for these supplies. Someone from [employer] should call you to confirm your order and discuss those potential costs before they ship your products -- please anticipate that call. If your out-of-pocket cost could be substantial, [facility] has a financial hardship program for patients who qualify, so please ask about that if you might need a hand. If you have any questions about your supplies or your potential out-of-pocket costs, or if you need to place an order for a refill of supplies (typically monthly), please call [phone]. NAME:  Cheri Taveras   YOB: 1950  PRIMARY DIAGNOSIS FOR WOUND CARE CENTER:  Pressure ulcer     Wound cleansing:   Do not scrub or use excessive force. Wash hands with soap and water before and after dressing changes. Prior to applying a clean dressing, cleanse wound with normal saline, wound cleanser, or mild soap and water. Ask your physician or nurse before getting the wound(s) wet in the shower.                 Wound care for home:     Sacral Wound: HOLD NPWT starting 5/19/2021  Vashe spray to wound  Triad then Opticell Ag to wound  Cover with mepilex border  Change dressing every other day      ON HOLD starting 5/19/2021  Vashe or Hypochlorous acid soaked gauze applied to wound for 2-3 minutes, no need to rinse  Flagyl crushed & sprinkled on wound bed as needed for malodor  Stoma paste or Stoma barrier ring to distal area of wound to help obtain a better seal & prevent air leak  Drape to maegan-wound & under any foam for bridge  ADD USING COLLAGEN TO BASE OF WOUND   Black foam to wound & bridged to left hip (MAKE SURE NOT TO OVER FILL UNDERMINED AREA WITH BLACK FOAM)  Cover with drape  Pressure -150mmHg continuous  HHC to change dressing on Friday & Monday, Wound Care will change during visits on Wednesday        Please note, all wounds (unless stated otherwise here) were mechanically debrided at the time of cleansing here in the wound-care center today, so a small amount of pain, drainage or bleeding from that process might be expected, and is normal.      All products for home use, including multiple products for a single wound if applicable, are medically necessary in order to achieve the best chance at timely wound healing. See provider documentation for details if needed. Substituted dressings applied in the [facility] today, if applicable:     N/a     New orders for this week (labs, imaging, medications, etc.):        Please send patient with some Triad to use this week with dressing changes. Home Care to order dressing supplies through [address] [place] [address] when needed. May contact the local rep. For any questions or to order supplies  Karin Nickerson # [phone]      Additional instructions for specific diagnoses:     +ROHO cushion- use at [address] when sitting!!      General comments for pressure ulcers: *  Make sure you stay well-hydrated, and maintain good protein intake. *  Reposition at least every two hours, to keep from having pressure in one spot for too long. *  If you are not sure whether you have the best offloading surfaces (mattress, mattress overlay, chair cushion, heel-protector boots, etc), please ask. *  Moisturize your skin regularly with Vaseline, Aquaphor, Aveeno, CeraVe, Cetaphil, Eucerin, Lubriderm, etc; but keep the skin between your toes dry. *  If you smoke, your wound can not heal properly -- please talk with us when you're ready to quit.          F/U Appointment is with Dr. Roxane Hernandez in 1 week on

## 2021-05-24 NOTE — PROGRESS NOTES
[facility] Progress Note    Marietta Hadley     : 1950    DATE OF VISIT:  2021    Subjective:     Marietta Hadley is a 72 y.o. female who has a pressure ulcer located on the sacrum. Significant symptoms or pertinent wound history since last visit: this is the first I've seen her since she was admitted for her severe heart disease, and required CABG. During her admission it sounds like she had a [employer] VAC in place, with instillation therapy. Recently discharged home, back on her usual NPWT unit now. Post-op pain improving, appetite is ok, no fever. Doing her best to stay offloaded. Skin around the wound feeling more sensitive this week. Additional ulcer(s) noted? no.      Her current medication list consists of Acapella, DULoxetine, Insulin Syringe-Needle U-100, Roflumilast, Teriparatide (Recombinant), albuterol sulfate HFA, aspirin EC, atorvastatin, azithromycin, blood glucose test strips, calcium carbonate, cetirizine, clopidogrel, cyclobenzaprine, docusate sodium, etanercept, fluticasone, gabapentin, insulin glargine, insulin lispro, ipratropium, latanoprost, losartan, meloxicam, metoprolol tartrate, metroNIDAZOLE, morphine, naloxone, omeprazole, oxyCODONE-acetaminophen, predniSONE, and vitamin D.     Allergies: Atenolol    Objective:     Vitals:    21 0924   BP: 129/75   Pulse: 103   Resp: 18   Temp: 98.7 °F (37.1 °C)   TempSrc: Oral   Weight: 162 lb 6.4 oz (73.7 kg)   Height: 5' 10\" (1.778 m)     AAOx3, fatigued, NAD  No cellulitis, angitis, fluctuance  No regional acute arthritis or bursitis  No cutaneous Candidiasis  Blanca-ulcer skin: indurated, pink, warm, dry and contact dermatitis (from NPWT drape)  Ulcer(s): doesn't look quite as healthy as I was hoping for, especially after NPWT with instillation, presumably due to her period of time in the hospital lying supine, the stress of surgery, etc - about the same size, some granulation, but also some fibronecrotic SQ tissue, a bit of undermining, fibrin, presumed biofilm, serous exudate, no exposed bone, no signs of infection. Photos also saved in electronic chart.     Today's wound measurements, per RN documentation:  Wound 12/18/20 #2, Sacrum, Pressure Injury, Stage 4, Onset 5/2020-Wound Length (cm): 4 cm (remeasured per MD)    Wound 12/18/20 #2, Sacrum, Pressure Injury, Stage 4, Onset 5/2020-Wound Width (cm): 2.3 cm    Wound 12/18/20 #2, Sacrum, Pressure Injury, Stage 4, Onset 5/2020-Wound Depth (cm): 1.3 cm    Assessment:     Patient Active Problem List   Diagnosis Code    Rheumatoid arthritis ([facility] M06.9    Psoriasis L40.9    GERD (gastroesophageal reflux disease) K21.9    History of tobacco use Z87.891    Hyponatremia E87.1    Anemia D64.9    Cylindrical bronchiectasis ([facility]) J47.9    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    Immunocompromised state ([facility] D84.9    Hypokalemia E87.6    Coronary artery disease I25.10    Stasis edema of both lower extremities I87.303    Essential hypertension I10    Lumbar spondylosis M47.816    Mitral valve insufficiency and aortic valve insufficiency I08.0    Mixed hyperlipidemia E78.2    Myopia of both eyes H52.13    Osteoporosis M81.0    Other chronic sinusitis J32.8    Primary open angle glaucoma (POAG) of both eyes, mild stage H40.1131    Primary osteoarthritis of right hip M16.11    Type 2 diabetes mellitus with unspecified diabetic retinopathy without macular edema ([facility]) E11.319    Uncontrolled type 2 diabetes mellitus with diabetic peripheral angiopathy without gangrene, with long-term current use of insulin ([facility]) E11.51, E11.65, Z79.4    Pressure ulcer of coccygeal region, stage 4 ([facility] L89.154    NSTEMI (non-ST elevated myocardial infarction) ([facility] I21.4       Assessment of today's active condition(s): slowly healing stage 4 sacral pressure ulcer, no signs of infection, mild recent setback I think related to her unstable coronary artery disease, need for CABG, a period of time being bed-bound, etc. I think she might do best if we pause the NPWT for a week, allow her skin time to recover from the drape dermatitis, also change her local therapy a bit to try to soften up some of the fibrotic tissue. Factors contributing to occurrence and/or persistence of the chronic ulcer include diabetes, chronic pressure, decreased mobility, shear force and immunosuppression. Medical necessity of today's visit is shown by the above documentation. Sharp debridement is indicated today, based upon the exam findings in the wound(s) above. Procedure note:     Consent obtained. Time out performed per [facility] policy. Anesthetic  Anesthetic: 4% Lidocaine Cream     Using a curette, I sharply debrided the sacrum ulcer(s) down through and including the removal of subcutaneous tissue. The type(s) of tissue debrided included fibrin, biofilm and necrotic/eschar. Total Surface Area Debrided: 9 sq cm. The ulcers were then irrigated with normal saline solution. The procedure was completed with a small amount of bleeding, and hemostasis was with pressure. The patient tolerated the procedure well, with no significant complications. The patient's level of pain during and after the procedure was monitored. Post-debridement measurements, if different from pre-debridement, are in the flowsheet as well. Discharge plan:     Treatment in the wound care center today, per RN documentation: Wound 12/18/20 #2, Sacrum, Pressure Injury, Stage 4, Onset 5/2020-Dressing/Treatment: Other (comment) (triad, opticellAG, mepilex border ). Hold NPWT for a week. Keep focused on glucose control, protein intake, offloading. For the dermatitis, simply holding NPWT might be enough -- can apply some ZnO if needed, or some OTC hydrocortisone if more pruritic. Home treatment: good handwashing before and after any dressing changes. Cleanse wound with saline or soap & water before dressing change.  May use Vaseline (petrolatum),

## 2021-05-26 ENCOUNTER — HOSPITAL ENCOUNTER (OUTPATIENT)
Dept: WOUND CARE | Age: 71
Discharge: HOME | End: 2021-05-26
Payer: MEDICARE

## 2021-05-26 VITALS
TEMPERATURE: 98.4 F | RESPIRATION RATE: 16 BRPM | WEIGHT: 158 LBS | DIASTOLIC BLOOD PRESSURE: 65 MMHG | BODY MASS INDEX: 22.62 KG/M2 | HEIGHT: 70 IN | SYSTOLIC BLOOD PRESSURE: 111 MMHG | HEART RATE: 78 BPM

## 2021-05-26 DIAGNOSIS — L89.154 PRESSURE ULCER OF COCCYGEAL REGION, STAGE 4 (HCC): Primary | ICD-10-CM

## 2021-05-26 PROCEDURE — 97605 NEG PRS WND THER DME<=50SQCM: CPT

## 2021-05-26 PROCEDURE — 97597 DBRDMT OPN WND 1ST 20 CM/<: CPT

## 2021-05-26 PROCEDURE — 97597 DBRDMT OPN WND 1ST 20 CM/<: CPT | Performed by: INTERNAL MEDICINE

## 2021-05-26 RX ORDER — BACITRACIN ZINC AND POLYMYXIN B SULFATE 500; 1000 [USP'U]/G; [USP'U]/G
OINTMENT TOPICAL ONCE
Status: CANCELLED | OUTPATIENT
Start: 2021-05-26 | End: 2021-05-26

## 2021-05-26 RX ORDER — LIDOCAINE 50 MG/G
OINTMENT TOPICAL ONCE
Status: CANCELLED | OUTPATIENT
Start: 2021-05-26 | End: 2021-05-26

## 2021-05-26 RX ORDER — LIDOCAINE 40 MG/G
CREAM TOPICAL ONCE
Status: CANCELLED | OUTPATIENT
Start: 2021-05-26 | End: 2021-05-26

## 2021-05-26 RX ORDER — BACITRACIN ZINC AND POLYMYXIN B SULFATE 500; 1000 [USP'U]/G; [USP'U]/G
OINTMENT TOPICAL ONCE
Status: DISCONTINUED | OUTPATIENT
Start: 2021-05-26 | End: 2021-05-27 | Stop reason: HOSPADM

## 2021-05-26 RX ORDER — LIDOCAINE 40 MG/G
CREAM TOPICAL ONCE
Status: DISCONTINUED | OUTPATIENT
Start: 2021-05-26 | End: 2021-05-27 | Stop reason: HOSPADM

## 2021-05-26 RX ORDER — LIDOCAINE HYDROCHLORIDE 40 MG/ML
SOLUTION TOPICAL ONCE
Status: CANCELLED | OUTPATIENT
Start: 2021-05-26 | End: 2021-05-26

## 2021-05-26 ASSESSMENT — PAIN DESCRIPTION - PAIN TYPE: TYPE: CHRONIC PAIN

## 2021-05-26 ASSESSMENT — PAIN - FUNCTIONAL ASSESSMENT: PAIN_FUNCTIONAL_ASSESSMENT: PREVENTS OR INTERFERES SOME ACTIVE ACTIVITIES AND ADLS

## 2021-05-26 ASSESSMENT — PAIN DESCRIPTION - PROGRESSION: CLINICAL_PROGRESSION: NOT CHANGED

## 2021-05-26 ASSESSMENT — PAIN DESCRIPTION - ORIENTATION: ORIENTATION: LOWER

## 2021-05-26 ASSESSMENT — PAIN DESCRIPTION - DESCRIPTORS: DESCRIPTORS: OTHER (COMMENT)

## 2021-05-26 NOTE — PLAN OF CARE
[facility] Physician Orders and Discharge [address]  [address], [address]  [place], [facility]  Telephone: [phone]      Fax: [phone]        Your home care [address]     Your wound-care supplies will be provided by: We are ordering your wound-care supplies from [employer]. Please note, depending on your insurance coverage, you may have out-of-pocket expenses for these supplies. Someone from [employer] should call you to confirm your order and discuss those potential costs before they ship your products -- please anticipate that call. If your out-of-pocket cost could be substantial, [facility] has a financial hardship program for patients who qualify, so please ask about that if you might need a hand. If you have any questions about your supplies or your potential out-of-pocket costs, or if you need to place an order for a refill of supplies (typically monthly), please call [phone].      NAME:  Gris Taveras   DATE of BIRTH:  1950  PRIMARY DIAGNOSIS FOR WOUND CARE CENTER:  Pressure ulcer     Wound cleansing:   Do not scrub or use excessive force. Wash hands with soap and water before and after dressing changes. Prior to applying a clean dressing, cleanse wound with normal saline, wound cleanser, or mild soap and water.  Ask your physician or nurse before getting the wound(s) wet in the shower.                Wound care for home:     Sacral Wound:    Resume NPWT 5/26/2021  Vashe or Hypochlorous acid soaked gauze applied to wound for 2-3 minutes, no need to rinse  Flagyl crushed & sprinkled on wound bed as needed for malodor  Stoma paste or Stoma barrier ring to distal area of wound to help obtain a better seal & prevent air leak  Drape to maegan-wound & under any foam for bridge  ADD USING COLLAGEN TO BASE OF WOUND   Black foam to wound & bridged to left hip (MAKE SURE NOT TO OVER FILL UNDERMINED AREA WITH BLACK remove them at least once a day to inspect your toes or feet, even if you're not changing the wraps or dressings underneath.  If you see anything concerning (redness, excess moisture, etc), please call and let us know right away.     Should you experience any significant changes in your wound(s) (including redness, increased warmth, increased pain, increased drainage, odor, or fever) or have questions about your wound care, please contact the [employer] at [phone] Monday-Thursday from 8:00 am - 4:30 pm, or Friday from 8:00 am - 2:30 pm.  If you need help with your wound outside these hours and cannot wait until we are again available, contact your home-care company (if applicable), your PCP, or go to the nearest emergency room

## 2021-05-26 NOTE — PROGRESS NOTES
Assessment of sternum and right lower leg incisions reveal well approximated edges on both sites. Two scabbed areas present below bra, one scab is ready to fall off, was rubbed by bra with small pin head size amount of blood present on bra. No active bleeding at present.

## 2021-06-01 NOTE — PROGRESS NOTES
5/2020-Wound Depth (cm): 1.3 cm    Assessment:     Patient Active Problem List   Diagnosis Code    Rheumatoid arthritis ([facility] M06.9    Psoriasis L40.9    GERD (gastroesophageal reflux disease) K21.9    History of tobacco use Z87.891    Hyponatremia E87.1    Anemia D64.9    Cylindrical bronchiectasis (HCC) J47.9    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    Immunocompromised state ([facility] D84.9    Hypokalemia E87.6    Coronary artery disease I25.10    Stasis edema of both lower extremities I87.303    Essential hypertension I10    Lumbar spondylosis M47.816    Mitral valve insufficiency and aortic valve insufficiency I08.0    Mixed hyperlipidemia E78.2    Myopia of both eyes H52.13    Osteoporosis M81.0    Other chronic sinusitis J32.8    Primary open angle glaucoma (POAG) of both eyes, mild stage H40.1131    Primary osteoarthritis of right hip M16.11    Type 2 diabetes mellitus with unspecified diabetic retinopathy without macular edema ([facility]) E11.319    Uncontrolled type 2 diabetes mellitus with diabetic peripheral angiopathy without gangrene, with long-term current use of insulin ([facility]) E11.51, E11.65, Z79.4    Pressure ulcer of coccygeal region, stage 4 ([facility]) L89.154    NSTEMI (non-ST elevated myocardial infarction) ([facility] I21.4       Assessment of today's active condition(s): RA, stage 4 sacral pressure ulcer, no signs of osteo, better offloaded now, making slow progress toward healing with NPWT in addition to standard measures; did have a bit of a setback this past month, with her hospital admission for CABG. Factors contributing to occurrence and/or persistence of the chronic ulcer include diabetes, chronic pressure, decreased mobility, shear force and immunosuppression. Medical necessity of today's visit is shown by the above documentation. Sharp debridement is indicated today, based upon the exam findings in the wound(s) above. Procedure note:     Consent obtained.  Time out performed per [facility] policy. Anesthetic  Anesthetic: 4% Lidocaine Cream     Using a curette, I sharply debrided the sacrum ulcer(s) down through and including the removal of dermis. The type(s) of tissue debrided included fibrin, biofilm and exudate. Total Surface Area Debrided: 9 sq cm. The ulcers were then irrigated with normal saline solution. The procedure was completed with a small amount of bleeding, and hemostasis was with pressure. The patient tolerated the procedure well, with no significant complications. The patient's level of pain during and after the procedure was monitored. Post-debridement measurements, if different from pre-debridement, are in the flowsheet as well. Discharge plan:     Treatment in the wound care center today, per RN documentation: Wound 12/18/20 #2, Sacrum, Pressure Injury, Stage 4, Onset 5/2020-Dressing/Treatment: Other (comment) (Vashe,skin prep,Flagyl,Puracol Plus,black foam (NPWT)). Continue to work on glucose control, diabetic diet, protein intake, offloading (ROHO cushion when seated, frequent position changes, lying on her sides in bed). Home treatment: good handwashing before and after any dressing changes. Cleanse wound with saline or soap & water before dressing change. May use Vaseline (petrolatum), Aquaphor, Aveeno, CeraVe, Cetaphil, Eucerin, Lubriderm, etc for dry skin. Dressing type for home: Hypochlorous acid spray, Kathia and NPWT (black foam, standard drape, bridge to one hip, 150 mmHg continuous negative pressure), three times weekly. Written discharge instructions given to patient. Follow up in 1 week.     Electronically signed by Stu Trujillo MD on 6/1/2021 at 12:44 PM.

## 2021-06-02 ENCOUNTER — HOSPITAL ENCOUNTER (OUTPATIENT)
Dept: WOUND CARE | Age: 71
Discharge: HOME | End: 2021-06-02
Payer: MEDICARE

## 2021-06-02 VITALS
WEIGHT: 158.8 LBS | HEART RATE: 114 BPM | HEIGHT: 70 IN | BODY MASS INDEX: 22.73 KG/M2 | SYSTOLIC BLOOD PRESSURE: 155 MMHG | TEMPERATURE: 98 F | RESPIRATION RATE: 16 BRPM | DIASTOLIC BLOOD PRESSURE: 88 MMHG

## 2021-06-02 DIAGNOSIS — L89.154 PRESSURE ULCER OF COCCYGEAL REGION, STAGE 4 (HCC): Primary | ICD-10-CM

## 2021-06-02 PROCEDURE — 11042 DBRDMT SUBQ TIS 1ST 20SQCM/<: CPT | Performed by: INTERNAL MEDICINE

## 2021-06-02 PROCEDURE — 11042 DBRDMT SUBQ TIS 1ST 20SQCM/<: CPT

## 2021-06-02 PROCEDURE — 97605 NEG PRS WND THER DME<=50SQCM: CPT

## 2021-06-02 RX ORDER — BACITRACIN ZINC AND POLYMYXIN B SULFATE 500; 1000 [USP'U]/G; [USP'U]/G
OINTMENT TOPICAL ONCE
Status: CANCELLED | OUTPATIENT
Start: 2021-06-02 | End: 2021-06-02

## 2021-06-02 RX ORDER — METRONIDAZOLE 250 MG/1
TABLET ORAL
Qty: 30 TABLET | Refills: 1 | Status: ON HOLD | OUTPATIENT
Start: 2021-06-02 | End: 2021-07-15 | Stop reason: HOSPADM

## 2021-06-02 RX ORDER — LIDOCAINE 40 MG/G
CREAM TOPICAL ONCE
Status: DISCONTINUED | OUTPATIENT
Start: 2021-06-02 | End: 2021-06-03 | Stop reason: HOSPADM

## 2021-06-02 RX ORDER — LIDOCAINE 40 MG/G
CREAM TOPICAL ONCE
Status: CANCELLED | OUTPATIENT
Start: 2021-06-02 | End: 2021-06-02

## 2021-06-02 RX ORDER — LIDOCAINE 50 MG/G
OINTMENT TOPICAL ONCE
Status: CANCELLED | OUTPATIENT
Start: 2021-06-02 | End: 2021-06-02

## 2021-06-02 RX ORDER — LIDOCAINE HYDROCHLORIDE 40 MG/ML
SOLUTION TOPICAL ONCE
Status: CANCELLED | OUTPATIENT
Start: 2021-06-02 | End: 2021-06-02

## 2021-06-02 ASSESSMENT — PAIN SCALES - GENERAL: PAINLEVEL_OUTOF10: 0

## 2021-06-02 NOTE — PLAN OF CARE
Wound stable, debridement per MD. Will cont. With current wound care regime with NPWT as ordered. Reinforced importance of frequent repositioning from side to side. Pt. Using ROHO cushion for off-loading. Pt. Also states she is supplementing with protein drinks. F/u in [facility] in 1 week as ordered, pt. Aware to call sooner with any changes or questions/concerns. Discharge instructions reviewed with patient, all questions answered, copy given to patient. Dressings were applied to all wounds per M.D. Instructions at this visit.

## 2021-06-02 NOTE — PLAN OF CARE
[address] [facility] Physician Orders and Discharge [address]  [address], [address]  [place], [facility]  Telephone: [phone]      Fax: [phone] home care company:   [facility]     Your wound-care supplies will be provided by: We are ordering your wound-care supplies from [employer]. Please note, depending on your insurance coverage, you may have out-of-pocket expenses for these supplies. Someone from [employer] should call you to confirm your order and discuss those potential costs before they ship your products -- please anticipate that call. If your out-of-pocket cost could be substantial, [facility] has a financial hardship program for patients who qualify, so please ask about that if you might need a hand. If you have any questions about your supplies or your potential out-of-pocket costs, or if you need to place an order for a refill of supplies (typically monthly), please call [phone]. NAME:  Parag Taveras   YOB: 1950  PRIMARY DIAGNOSIS FOR WOUND CARE CENTER:  Pressure ulcer     Wound cleansing:   Do not scrub or use excessive force. Wash hands with soap and water before and after dressing changes. Prior to applying a clean dressing, cleanse wound with normal saline, wound cleanser, or mild soap and water. Ask your physician or nurse before getting the wound(s) wet in the shower. Wound care for home:     Sacral Wound:    PSO & mepilex border to small skin tear. Leave in place until Friday.      Vashe or Hypochlorous acid soaked gauze applied to wound for 2-3 minutes, no need to rinse  Flagyl crushed & sprinkled on wound bed as needed for malodor  Stoma paste or Stoma barrier ring to distal area of wound to help obtain a better seal & prevent air leak  DRAPE to maegan-wound & [address] foam to wound & bridged to left hip (MAKE SURE NOT TO OVER FILL UNDERMINED AREA WITH BLACK FOAM)  Cover with drape  Pressure -150mmHg continuous  HHC to change dressing on Friday & Monday, Wound Care will change during visits on Wednesday        Please note, all wounds (unless stated otherwise here) were mechanically debrided at the time of cleansing here in the wound-care center today, so a small amount of pain, drainage or bleeding from that process might be expected, and is normal.      All products for home use, including multiple products for a single wound if applicable, are medically necessary in order to achieve the best chance at timely wound healing. See provider documentation for details if needed. Substituted dressings applied in the [address] [place] [address],[address] today, if applicable:     N/a     New orders for this week (labs, imaging, medications, etc.):        Please send remaining collagen with patient. Home Care to order dressing supplies through [address] [place] [address] when needed. May contact the local rep. For any questions or to order supplies  Mayela Arreolaefraín # [phone]      Additional instructions for specific diagnoses:     +ROHO cushion- use at [address] when sitting!!      General comments for pressure ulcers: *  Make sure you stay well-hydrated, and maintain good protein intake. *  Reposition at least every two hours, to keep from having pressure in one spot for too long. *  If you are not sure whether you have the best offloading surfaces (mattress, mattress overlay, chair cushion, heel-protector boots, etc), please ask. *  Moisturize your skin regularly with Vaseline, Aquaphor, Aveeno, CeraVe, Cetaphil, Eucerin, Lubriderm, etc; but keep the skin between your toes dry. *  If you smoke, your wound can not heal properly -- please talk with us when you're ready to quit.          F/U Appointment is with Dr. Petey Mcintyre in 1 week on                                                at                       .     Your nurse  is ABRAHAM Zambrano      If we applied slip-resistant hospital socks today, be sure to remove them at least once a day to inspect your toes or feet, even if you're not changing the wraps or dressings underneath. If you see anything concerning (redness, excess moisture, etc), please call and let us know right away.      Should you experience any significant changes in your wound(s) (including redness, increased warmth, increased pain, increased drainage, odor, or fever) or have questions about your wound care, please contact the [address] [place] [address] at [phone] Monday-Thursday from 8:00 am - 4:30 pm, or Friday from 8:00 am - 2:30 pm.  If you need help with your wound outside these hours and cannot wait until we are again available, contact your home-care company (if applicable), your PCP, or go to the nearest emergency room

## 2021-06-07 ENCOUNTER — TELEPHONE (OUTPATIENT)
Dept: CARDIOLOGY CLINIC | Age: 71
End: 2021-06-07

## 2021-06-07 NOTE — PROGRESS NOTES
[facility] Progress Note    Christina Colon     : 1950    DATE OF VISIT:  2021    Subjective:     Christina Colon is a 72 y.o. female who has a pressure ulcer located on the sacrum. Significant symptoms or pertinent wound history since last visit: feeling ok overall, mild-mod wound pain, a bit of skin irritation from direct contact between foam and skin this week, no fever, appetite ok, doing well with offloading. Additional ulcer(s) noted? no.      Her current medication list consists of Acapella, DULoxetine, Insulin Syringe-Needle U-100, Roflumilast, Teriparatide (Recombinant), albuterol sulfate HFA, aspirin EC, atorvastatin, azithromycin, blood glucose test strips, calcium carbonate, cetirizine, clopidogrel, cyclobenzaprine, docusate sodium, etanercept, fluticasone, gabapentin, insulin glargine, insulin lispro, ipratropium, latanoprost, losartan, meloxicam, metoprolol tartrate, metroNIDAZOLE, morphine, naloxone, omeprazole, oxyCODONE-acetaminophen, predniSONE, and vitamin D. Allergies: Atenolol    Objective:     Vitals:    21 0917   BP: (!) 155/88   Pulse: 114   Resp: 16   Temp: 98 °F (36.7 °C)   TempSrc: Oral   Weight: 158 lb 12.8 oz (72 kg)   Height: 5' 10\" (1.778 m)     AAOx3, fatigued, NAD  No cellulitis, angitis, fluctuance  No regional acute arthritis or bursitis  No cutaneous Candidiasis; one small erosion toward the right side, from where bridged VAC foam was focally against her skin  No allodynia  Blanca-ulcer skin: indurated, pink and warm. Ulcer(s): slowly smaller, mostly red and pink and granular, a bit of depth is looking more granular and less fibrotic too, still just a bit of fibrotic fat necrosis there, no deep structures exposed, some fibrin and presumed biofilm, just serous to serosanguinous exudate. Photos also saved in electronic chart.     Today's wound measurements, per RN documentation:  Wound 20 #2, Sacrum, Pressure Injury, Stage 4, Onset 5/2020-Wound Length (cm): 3.5 cm    Wound 12/18/20 #2, Sacrum, Pressure Injury, Stage 4, Onset 5/2020-Wound Width (cm): 1.5 cm    Wound 12/18/20 #2, Sacrum, Pressure Injury, Stage 4, Onset 5/2020-Wound Depth (cm): 1.3 cm    Assessment:     Patient Active Problem List   Diagnosis Code    Rheumatoid arthritis ([facility] M06.9    Psoriasis L40.9    GERD (gastroesophageal reflux disease) K21.9    History of tobacco use Z87.891    Hyponatremia E87.1    Anemia D64.9    Cylindrical bronchiectasis (HCC) J47.9    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    Immunocompromised state ([facility] D84.9    Hypokalemia E87.6    Coronary artery disease I25.10    Stasis edema of both lower extremities I87.303    Essential hypertension I10    Lumbar spondylosis M47.816    Mitral valve insufficiency and aortic valve insufficiency I08.0    Mixed hyperlipidemia E78.2    Myopia of both eyes H52.13    Osteoporosis M81.0    Other chronic sinusitis J32.8    Primary open angle glaucoma (POAG) of both eyes, mild stage H40.1131    Primary osteoarthritis of right hip M16.11    Type 2 diabetes mellitus with unspecified diabetic retinopathy without macular edema ([facility]) E11.319    Uncontrolled type 2 diabetes mellitus with diabetic peripheral angiopathy without gangrene, with long-term current use of insulin ([facility]) E11.51, E11.65, Z79.4    Pressure ulcer of coccygeal region, stage 4 ([facility] L89.154    NSTEMI (non-ST elevated myocardial infarction) ([facility] I21.4       Assessment of today's active condition(s): RA, slowly healing stage 4 sacral pressure ulcer, no signs of infection, making slow progress with debridement + NPWT + standard care, after a recent period of stagnation when she was admitted for an acute coronary syndrome and CABG. Factors contributing to occurrence and/or persistence of the chronic ulcer include chronic pressure, decreased mobility, shear force and immunosuppression.  Medical necessity of today's visit is shown by the above documentation. Sharp debridement is indicated today, based upon the exam findings in the wound(s) above. Procedure note:     Consent obtained. Time out performed per [facility] policy. Anesthetic  Anesthetic: 4% Lidocaine Cream     Using a curette, I sharply debrided the sacrum ulcer(s) down through and including the removal of subcutaneous tissue. The type(s) of tissue debrided included fibrin, biofilm and exudate. Total Surface Area Debrided: 5 sq cm. The ulcers were then irrigated with normal saline solution. The procedure was completed with a small amount of bleeding, and hemostasis was with pressure. The patient tolerated the procedure well, with no significant complications. The patient's level of pain during and after the procedure was monitored. Post-debridement measurements, if different from pre-debridement, are in the flowsheet as well. Discharge plan:     Treatment in the wound care center today, per RN documentation: Wound 12/18/20 #2, Sacrum, Pressure Injury, Stage 4, Onset 5/2020-Dressing/Treatment:  (Collagen, NPWT). Keep focused on offloading, glucose control, protein intake. Home treatment: good handwashing before and after any dressing changes. Cleanse wound with saline or soap & water before dressing change. May use Vaseline (petrolatum), Aquaphor, Aveeno, CeraVe, Cetaphil, Eucerin, Lubriderm, etc for dry skin. Dressing type for home: Hypochlorous acid spray, Flagyl powder, Kathia and NPWT (black foam, bridge to one side, hydrocolloid or bordered dressing PRN to cover that small erosion, standard drape, continuous negative pressure), three times weekly. Written discharge instructions given to patient. Follow up in 1 week.     Electronically signed by Jamie Lyman MD on 6/7/2021 at 11:05 AM.

## 2021-06-08 NOTE — TELEPHONE ENCOUNTER
Spoke with [facility]. States the reason BP was low she took a lisinopril and this was actually taken off her list.  Forgot it was D/C'd.  BP usually runs 126/70 or in that range. Spoke with Dr. Constance Coker and was ok with this.

## 2021-06-08 NOTE — TELEPHONE ENCOUNTER
Have patient continue to monitor blood pressure status post recent open heart surgery and recent return to the operating room for evacuation of clot in the mediastinum. If her blood pressure remains in the 90s for the rest of the day or tomorrow she needs to come into the office for a stat limited echocardiogram to evaluate the pericardial space and then look at medicines.   If she starts become symptomatic her blood pressure is going even lower she needs to go immediately to the emergency room

## 2021-06-09 ENCOUNTER — HOSPITAL ENCOUNTER (OUTPATIENT)
Dept: WOUND CARE | Age: 71
Discharge: HOME | End: 2021-06-09
Payer: MEDICARE

## 2021-06-09 VITALS
DIASTOLIC BLOOD PRESSURE: 68 MMHG | BODY MASS INDEX: 22.59 KG/M2 | SYSTOLIC BLOOD PRESSURE: 136 MMHG | TEMPERATURE: 99.3 F | HEIGHT: 70 IN | RESPIRATION RATE: 16 BRPM | HEART RATE: 98 BPM | WEIGHT: 157.8 LBS

## 2021-06-09 DIAGNOSIS — L89.154 PRESSURE ULCER OF COCCYGEAL REGION, STAGE 4 (HCC): Primary | ICD-10-CM

## 2021-06-09 PROCEDURE — 97605 NEG PRS WND THER DME<=50SQCM: CPT

## 2021-06-09 PROCEDURE — 11042 DBRDMT SUBQ TIS 1ST 20SQCM/<: CPT

## 2021-06-09 PROCEDURE — 11042 DBRDMT SUBQ TIS 1ST 20SQCM/<: CPT | Performed by: INTERNAL MEDICINE

## 2021-06-09 RX ORDER — BACITRACIN ZINC AND POLYMYXIN B SULFATE 500; 1000 [USP'U]/G; [USP'U]/G
OINTMENT TOPICAL ONCE
Status: DISCONTINUED | OUTPATIENT
Start: 2021-06-09 | End: 2021-06-10 | Stop reason: HOSPADM

## 2021-06-09 RX ORDER — LIDOCAINE 40 MG/G
CREAM TOPICAL ONCE
Status: DISCONTINUED | OUTPATIENT
Start: 2021-06-09 | End: 2021-06-10 | Stop reason: HOSPADM

## 2021-06-09 RX ORDER — BACITRACIN ZINC AND POLYMYXIN B SULFATE 500; 1000 [USP'U]/G; [USP'U]/G
OINTMENT TOPICAL ONCE
Status: CANCELLED | OUTPATIENT
Start: 2021-06-09 | End: 2021-06-09

## 2021-06-09 RX ORDER — LIDOCAINE 50 MG/G
OINTMENT TOPICAL ONCE
Status: CANCELLED | OUTPATIENT
Start: 2021-06-09 | End: 2021-06-09

## 2021-06-09 RX ORDER — LIDOCAINE HYDROCHLORIDE 40 MG/ML
SOLUTION TOPICAL ONCE
Status: CANCELLED | OUTPATIENT
Start: 2021-06-09 | End: 2021-06-09

## 2021-06-09 RX ORDER — LIDOCAINE 40 MG/G
CREAM TOPICAL ONCE
Status: CANCELLED | OUTPATIENT
Start: 2021-06-09 | End: 2021-06-09

## 2021-06-09 ASSESSMENT — PAIN DESCRIPTION - FREQUENCY: FREQUENCY: INTERMITTENT

## 2021-06-09 ASSESSMENT — PAIN SCALES - GENERAL: PAINLEVEL_OUTOF10: 5

## 2021-06-09 ASSESSMENT — PAIN DESCRIPTION - LOCATION: LOCATION: COCCYX

## 2021-06-09 ASSESSMENT — PAIN DESCRIPTION - PAIN TYPE: TYPE: CHRONIC PAIN

## 2021-06-09 ASSESSMENT — PAIN - FUNCTIONAL ASSESSMENT: PAIN_FUNCTIONAL_ASSESSMENT: PREVENTS OR INTERFERES SOME ACTIVE ACTIVITIES AND ADLS

## 2021-06-09 ASSESSMENT — PAIN DESCRIPTION - PROGRESSION: CLINICAL_PROGRESSION: NOT CHANGED

## 2021-06-09 ASSESSMENT — PAIN DESCRIPTION - DESCRIPTORS: DESCRIPTORS: TENDER

## 2021-06-09 ASSESSMENT — PAIN DESCRIPTION - ONSET: ONSET: ON-GOING

## 2021-06-09 NOTE — PROGRESS NOTES
1516  Corey V[facility]   Cardiovascular Evaluation    PATIENT: Winston Rodriguez  DATE: 6/10/2021  MRN: [id number]  CSN: [id number]  : 1950      Primary Care Doctor: Denise Kincaid MD  Reason for evaluation:   Follow-up, Coronary Artery Disease, Hyperlipidemia, Shortness of Breath (with activity), and Edema (legs)      Subjective:   History of present illness on initial date of evaluation:   Winston Rodriguez is a 72 y.o. patient with a history of CAD with NSTEMI, anemia, HLD and DM who presents for hospital follow up. She present to the ER on 21 with fatigue and chest pain. Her echo from 21 showed her EF was 55%. Mild MR and TR. Her cardiac cath from 21 showed severe MV CAD. She underwent CABG x3 with BIMAL obliteration with VICKY Hernandez on 21. On 05/10/21 she underwent mediastinal exploration and evacuation of Hematoma with DR Rocha. Today she reports she has SOB with activity and leg edema. She sees DR Buckley for a sacral wound. She denies CP, palpitations, dizziness or syncope. She denies bleeding from bowels but has significant bruising. She uses compression stockings typically but didn't today.          Patient Active Problem List   Diagnosis    Rheumatoid arthritis ([address]    Psoriasis    GERD (gastroesophageal reflux disease)    History of tobacco use    Hyponatremia    Anemia    Cylindrical bronchiectasis (HCC)    Tracheobronchomalacia    Immunocompromised state ([address]    Hypokalemia    Coronary artery disease    Stasis edema of both lower extremities    Essential hypertension    Lumbar spondylosis    Mitral valve insufficiency and aortic valve insufficiency    Mixed hyperlipidemia    Myopia of both eyes    Osteoporosis    Other chronic sinusitis    Primary open angle glaucoma (POAG) of both eyes, mild stage    Primary osteoarthritis of right hip    Type 2 diabetes mellitus with unspecified diabetic retinopathy without macular edema ([address]    Uncontrolled type 2 diabetes mellitus with diabetic peripheral angiopathy without gangrene, with long-term current use of insulin (HCC)    Pressure ulcer of coccygeal region, stage 4 (HCC)    NSTEMI (non-ST elevated myocardial infarction) ([address]         Past Medical History:   has a past medical history of Atherosclerosis of native artery of right lower extremity with rest pain ([address] Back pain, Branch retinal vein occlusion, Bronchiectasis with acute exacerbation ([address] Closed compression fracture of thoracic vertebra ([address] Closed fracture of facial bone with routine healing, Closed jaw fracture ([address] Community acquired pneumonia of left lower lobe of lung, Compression fracture of L1 lumbar vertebra (HCC), COPD (chronic obstructive pulmonary disease) ([address] Fracture of tibial plateau, closed, left, initial encounter, Minimally displaced zone I fracture of sacrum ([facility]), Mucus plugging of bronchi, Osteomyelitis of mandible, Osteoporosis with pathological fracture, Post herpetic neuralgia, Proximal humerus fracture, Rheumatoid arthritis ([address] Shingles, Sleep apnea, Temporal arteritis ([address] Tobacco use, Tracheomalacia, and Vitreous hemorrhage, right eye ([address] Surgical History:   has a past surgical history that includes hernia repair; Mandible fracture surgery; Foot surgery; Elbow surgery; Cataract removal; Tubal ligation; Knee arthroscopy; Kyphosis surgery; laminectomy; Spinal fusion; Septoplasty (5/7/2013); Artery surgery (5/30/13); Upper gastrointestinal endoscopy (4/8/2014); bronchoscopy; bronchoscopy (N/A, 6/12/2019); Mandible fracture surgery (02/2020); back surgery (08/2020); other surgical history (01/08/2021); Pressure ulcer debridement (N/A, 1/8/2021); Artery Biopsy (Right, 03/01/2021); Coronary artery bypass graft (N/A, 5/3/2021); Mediastinoscopy (N/A, 5/5/2021); and Cardiac surgery (05/03/2021). Social History:   reports that she quit smoking about 30 years ago.  Her smoking use included cigarettes. She has a 20.00 pack-year smoking history. She has never used smokeless tobacco. She reports previous alcohol use. She reports that she does not use drugs. Family History:  No evidence for sudden cardiac death or premature CAD    Home Medications:  Reviewed and are listed in nursing record. and/or listed below  Current Outpatient Medications   Medication Sig Dispense Refill    metroNIDAZOLE (FLAGYL) 250 MG tablet Crush one tablet into a fine powder, sprinkle into wound TIW as needed for malodor, at time of dressing change. 30 tablet 1    predniSONE (DELTASONE) 1 MG tablet Take 4 mg by mouth daily      insulin glargine (LANTUS) 100 UNIT/ML injection vial Inject 15 Units into the skin nightly 1 vial 0    metoprolol tartrate (LOPRESSOR) 25 MG tablet Take 0.5 tablets by mouth 2 times daily Hold this medication for pulse <87 or systolic BP <718 30 tablet 0    clopidogrel (PLAVIX) 75 MG tablet Take 1 tablet by mouth daily 30 tablet 0    oxyCODONE-acetaminophen (PERCOCET)  MG per tablet Take 1 tablet by mouth 2 times daily.  docusate sodium (COLACE) 100 MG capsule Take 100 mg by mouth 2 times daily as needed for Constipation      DULoxetine (CYMBALTA) 60 MG extended release capsule Take 60 mg by mouth daily      gabapentin (NEURONTIN) 300 MG capsule Take 300 mg by mouth 2 times daily.  latanoprost (XALATAN) 0.005 % ophthalmic solution Place 1 drop into both eyes nightly      Teriparatide, Recombinant, (FORTEO) 600 MCG/2.4ML SOPN injection Inject 20 mcg into the skin daily      azithromycin (ZITHROMAX) 250 MG tablet TAKE 1 TABLET BY MOUTH EVERY DAY 30 tablet 5    NARCAN 4 MG/0.1ML LIQD nasal spray PLEASE SEE ATTACHED FOR DETAILED DIRECTIONS      morphine (MS CONTIN) 15 MG extended release tablet 15 mg 2 times daily.        ipratropium (ATROVENT) 0.06 % nasal spray USE 2 SPRAYS BY NASAL ROUTE 2-4 TIMES DAILY 1 Bottle 5    Roflumilast (DALIRESP) 500 MCG tablet Take 1 tablet by mouth daily 30 tablet 11    albuterol sulfate  (90 Base) MCG/ACT inhaler INHALE 2 PUFFS INTO THE LUNGS EVERY 4 HOURS AS NEEDED FOR WHEEZING 1 Inhaler 5    vitamin D (CHOLECALCIFEROL) 1000 UNIT TABS tablet Take 5,000 Units by mouth daily       calcium carbonate (OSCAL) 500 MG TABS tablet Take 500 mg by mouth daily      atorvastatin (LIPITOR) 40 MG tablet Take 40 mg by mouth      cyclobenzaprine (FLEXERIL) 10 MG tablet Take 10 mg by mouth 2 times daily as needed       Insulin Syringe-Needle U-100 31G X 5/16\" 0.5 ML MISC USE 5 TIMES DAILY      meloxicam (MOBIC) 15 MG tablet Patient states taking only as needed      ACCU-CHEK CEDRICK PLUS strip TEST 4 TIMES DAILY  3    aspirin EC 81 MG EC tablet Take 1 tablet by mouth daily      insulin lispro (HUMALOG) 100 UNIT/ML injection vial Inject 0-12 Units into the skin 3 times daily (with meals)      Misc. Devices (ACAPELLA) MISC Take 1 Device by mouth as needed 1 each 0    fluticasone (FLONASE) 50 MCG/ACT nasal spray INHALE 2 SPRAYS IN EACH NOSTRIL DAILY 1 Bottle 5    cetirizine (ZYRTEC) 10 MG tablet Take 10 mg by mouth daily.  etanercept (ENBREL) 50 MG/ML injection Inject 50 mg into the skin every 7 days.  omeprazole (PRILOSEC) 40 MG capsule Take 40 mg by mouth daily        No current facility-administered medications for this visit. Allergies:  Atenolol     Review of Systems:   A 14 point review of symptoms completed. Pertinent positives identified in the HPI, all other review of symptoms negative as below.     Objective:   PHYSICAL EXAM:    Vitals:    06/10/21 1044   BP: 130/63   Pulse: 80   SpO2: 99%    Weight: 157 lb (71.2 kg)     Wt Readings from Last 3 Encounters:   06/10/21 157 lb (71.2 kg)   06/09/21 157 lb 12.8 oz (71.6 kg)   06/02/21 158 lb 12.8 oz (72 kg)         General Appearance:  Alert, cooperative, no distress, appears stated age   Head:  Normocephalic, atraumatic   Eyes:  PERRL, conjunctiva/corneas clear   Nose: Nares normal, no drainage or sinus tenderness   Throat: Lips, mucosa, and tongue normal   Neck: Supple, symmetrical, trachea midline, NL thyroid no carotid bruit or JVD   Lungs:   CTAB, respirations unlabored, well-healed surgical scar   Chest Wall:  No tenderness or deformity   Heart:  Regular rhythm and normal rate; S1, S2 are normal;   no murmur noted; no rub or gallop   Abdomen:   Soft, non-tender, +BS x 4, no masses, no organomegaly   Extremities: Extremities normal, atraumatic, no cyanosis 2+ BLE pitting R>L edema   Pulses: 2+ and symmetric   Skin: Skin color, texture, turgor normal, no rashes or lesions   Pysch: Normal mood and affect   Neurologic: Normal gross motor and sensory exam.         LABS   CBC:      Lab Results   Component Value Date    WBC 5.9 05/10/2021    RBC 2.83 05/10/2021    HGB 8.5 05/10/2021    HCT 25.6 05/10/2021    MCV 90.5 05/10/2021    RDW 16.9 05/10/2021     05/10/2021     CMP:  Lab Results   Component Value Date     05/10/2021    K 4.2 05/10/2021    K 2.7 04/25/2021     05/10/2021    CO2 25 05/10/2021    BUN 11 05/10/2021    CREATININE 0.7 05/10/2021    GFRAA >60 05/10/2021    GFRAA >60 05/07/2013    AGRATIO 0.9 05/03/2021    LABGLOM >60 05/10/2021    GLUCOSE 94 05/10/2021    PROT 6.3 05/03/2021    PROT 7.1 03/21/2013    CALCIUM 8.1 05/10/2021    BILITOT 0.4 05/03/2021    ALKPHOS 123 05/03/2021    AST 18 05/03/2021    ALT 11 05/03/2021     PT/INR:   No results found for: PTINR  Liver:  No components found for: CHLPL  Lab Results   Component Value Date    ALT 11 05/03/2021    AST 18 05/03/2021    ALKPHOS 123 05/03/2021    BILITOT 0.4 05/03/2021     Lab Results   Component Value Date    LABA1C 8.1 05/03/2021     Lipids:         Lab Results   Component Value Date    TRIG 64 05/03/2021    TRIG 110 04/24/2021    TRIG 65 07/22/2010            Lab Results   Component Value Date    HDL 38 (L) 05/03/2021    HDL 30 (L) 04/24/2021    HDL 43 07/22/2010            Lab Results Component Value Date    LDLCALC 35 2021    LDLCALC 29 2021    LDLCALC 106 (H) 2010            Lab Results   Component Value Date    LABVLDL 13 2021    LABVLDL 22 2021    LABVLDL 13 2010         CARDIAC DATA   EK21  SR HR 85    ECHO/MUGA: 21  Normal left ventricle systolic function with an estimated ejection fraction   of 55%. No regional wall motion abnormalities are seen. Normal left ventricular diastolic filling pressure. Mild eccentric aortic regurgitation. Mild mitral and tricuspid regurgitation. Systolic pulmonary artery pressure (SPAP) is normal and estimated at 27 mmHg   (right atrial pressure 3 mmHg). STRESS TEST:    Cath:[facility] 10/7/2016  This is a right dominant coronary arterial system    Left Main coronary artery: distal 30-40% lesion  Left anterior descending coronary artery: ostial 30-40% lesion  Left circumflex coronary artery: Heavily calcified proximal   vessel with ostial 50-60% lesion, mid 50-60% lesion, OM2: normal   caliber vessel, heavily calcified with proximal 50-60% lesion,   OM3: 30-40% proximal lesion  Right coronary artery: heavily calcified vessel, minimal luminal   irregularities, RPDA: small < 2mm caliber vessel with 100%   proximal occlusion and grade II R to R collaterals from an RV   marginal.  Left ventriculogram: Normal wall motion, LVEF = 55%  LVEDP: 4 mmHg  Aortic pressure: 90/50 mmHg    Impression:   2 Vessel CAD  Normal LV function  Normal LVEDP  Normal systemic pressures     CARDIAC CATH: 21  Procedure Findings:  1. Severe multi vessel coronary artery diease              ~not amenable to PCI  2. Normal left ventricular function with EF estimated at 55-60%  3. Normal left heart hemodynamics    CAB21 DR Bartholomew Flank  OPERATION PERFORMED:  1.   Urgent coronary bypass grafting surgery x3 with single greater  saphenous vein graft to the posterior ventricular branch of the right  coronary artery, separate single greater saphenous vein graft to the  second obtuse marginal branch of circumflex,  pedicled left internal artery to the LAD. 2.  Left atrial appendage obliteration with 40 mm AtriCure left atrial  clip. 3.  Cardiopulmonary bypass. 4.  Endoscopic vein harvest of the left greater saphenous vein. 5.  Transesophageal echo. 6.  Epiaortic ultrasound. 7.  Doppler verification of grafts. 8.  Bilateral five-level intercostal nerve block with Exparel. 9.  Platelet gel application. 10.  Sternal plating. 05/10/21 DR Rocha  OPERATION PERFORMED:  Mediastinal exploration and evacuation of  hematoma. VASCULAR/OTHER IMAGING:      Assessment and Plan   Christina Colon is a 72 y.o. female who presents today for the following problems:      1. CAD: new, improved   - 5/10/2021 3V CABG  2. Normocytic anemia  3. Bilateral lower extremity edema    MD Plan:  1. Patient doing well following coronary bypass surgery. Will refer to [facility] 2 cardiac rehab. 2.  Continue dual antiplatelet therapy for 1 year as tolerated, Lipitor, Lopressor. 3.  Patient is following up with Dr. Chaitanya Carter in 2 weeks time can discuss nodule at the vein harvest site with him. Does not appear to be infected and likely just scar formation.       Patient Active Problem List   Diagnosis    Rheumatoid arthritis ([address]    Psoriasis    GERD (gastroesophageal reflux disease)    History of tobacco use    Hyponatremia    Anemia    Cylindrical bronchiectasis (HCC)    Tracheobronchomalacia    Immunocompromised state ([address]    Hypokalemia    Coronary artery disease    Stasis edema of both lower extremities    Essential hypertension    Lumbar spondylosis    Mitral valve insufficiency and aortic valve insufficiency    Mixed hyperlipidemia    Myopia of both eyes    Osteoporosis    Other chronic sinusitis    Primary open angle glaucoma (POAG) of both eyes, mild stage    Primary osteoarthritis of right hip    Type 2 diabetes mellitus with unspecified diabetic retinopathy without macular edema (HCC)    Uncontrolled type 2 diabetes mellitus with diabetic peripheral angiopathy without gangrene, with long-term current use of insulin (HCC)    Pressure ulcer of coccygeal region, stage 4 ([address]    NSTEMI (non-ST elevated myocardial infarction) [facility])       Patient Plan:  1. Cardiac rehab at [facility]  2. Follow up with NP in 6 months      It is a pleasure to assist in the care of [facility] . Please call with any questions. Scribe's attestation: This note was scribed in the presence of Dr. Elian Morales by Lisseth Lennon MD, personally performed the services described in this documentation as scribed by the above signed scribe in my presence, and it is both accurate and complete to the best of our ability and knowledge. I agree with the details independently gathered by my clinical support staff, while the remaining scribed note accurately describes my personal service to the patient. The above RN is working as a scribe for and in the presence of myself . Working as a scribe, the RN may have prepopulated components of this note with my historical intellectual property under my direct supervision. Any additions to this intellectual property were performed at my direction. Furthermore, the content and accuracy of this note have been reviewed by me to the best of my ability.          Elian Morales MD, [address] [facility] Cardiologist  [facility]  [phone] [facility]  [phone] [address] [place] [address]

## 2021-06-09 NOTE — PLAN OF CARE
Wound stable & showing improvement, debridement per MD & pt. Tolerated well. Will cont. With current wound care regime with collagen & NPWT as ordered. Pt. Is cont. With good off-loading to assist with wound healing. [facility], pt. Aware to call sooner with any changes or questions/concerns. Discharge instructions reviewed with patient, all questions answered, copy given to patient. Dressings were applied to all wounds per M.D. Instructions at this visit.

## 2021-06-09 NOTE — PLAN OF CARE
[facility] Physician Orders and Discharge [address]  [address], [address]  [place], [facility]  Telephone: [phone]      Fax: [phone] home care company:   [facility]     Your wound-care supplies will be provided by: We are ordering your wound-care supplies from [employer]. Please note, depending on your insurance coverage, you may have out-of-pocket expenses for these supplies. Someone from [employer] should call you to confirm your order and discuss those potential costs before they ship your products -- please anticipate that call. If your out-of-pocket cost could be substantial, [facility] has a financial hardship program for patients who qualify, so please ask about that if you might need a hand. If you have any questions about your supplies or your potential out-of-pocket costs, or if you need to place an order for a refill of supplies (typically monthly), please call [phone]. NAME:  Arabella Taveras   YOB: 1950  PRIMARY DIAGNOSIS FOR WOUND CARE CENTER:  Pressure ulcer     Wound cleansing:   Do not scrub or use excessive force. Wash hands with soap and water before and after dressing changes. Prior to applying a clean dressing, cleanse wound with normal saline, wound cleanser, or mild soap and water. Ask your physician or nurse before getting the wound(s) wet in the shower.                 Wound care for home:     Sacral Wound:    Skin prep & Hydrocolloid to maegan-wound skin irritation to protect from drape   Vashe or Hypochlorous acid soaked gauze applied to wound for 2-3 minutes, no need to rinse  Flagyl crushed & sprinkled on wound bed as needed for malodor  Stoma paste or Stoma barrier ring to distal area of wound to help obtain a better seal & prevent air leak  DRAPE to maegan-wound & UNDER ANY FOAM FOR BRIDGE  COLLAGEN TO BASE OF WOUND   Black foam to wound & bridged to left hip (MAKE SURE NOT TO OVER FILL UNDERMINED AREA WITH BLACK FOAM)  Cover with drape  Pressure -150mmHg continuous  HHC to change dressing on Friday & Monday, Wound Care will change during visits on Wednesday        Please note, all wounds (unless stated otherwise here) were mechanically debrided at the time of cleansing here in the wound-care center today, so a small amount of pain, drainage or bleeding from that process might be expected, and is normal.      All products for home use, including multiple products for a single wound if applicable, are medically necessary in order to achieve the best chance at timely wound healing. See provider documentation for details if needed. Substituted dressings applied in the [address] [place] [address] today, if applicable:     N/a     New orders for this week (labs, imaging, medications, etc.):        Please send remaining collagen with patient. Home Care to order dressing supplies through [employer] when needed. May contact the local rep. For any questions or to order supplies  Rajani Dewey # [phone]      Additional instructions for specific diagnoses:     +ROHO cushion- use at [address] when sitting!!      General comments for pressure ulcers: *  Make sure you stay well-hydrated, and maintain good protein intake. *  Reposition at least every two hours, to keep from having pressure in one spot for too long. *  If you are not sure whether you have the best offloading surfaces (mattress, mattress overlay, chair cushion, heel-protector boots, etc), please ask. *  Moisturize your skin regularly with Vaseline, Aquaphor, Aveeno, CeraVe, Cetaphil, Eucerin, Lubriderm, etc; but keep the skin between your toes dry. *  If you smoke, your wound can not heal properly -- please talk with us when you're ready to quit.          F/U Appointment is with Dr. Timothy Griffith in 1 week on                                                at                       .     Your nurse  is ABRAHAM Zambrano      If we applied slip-resistant hospital socks today, be sure to remove them at least once a day to inspect your toes or feet, even if you're not changing the wraps or dressings underneath. If you see anything concerning (redness, excess moisture, etc), please call and let us know right away.      Should you experience any significant changes in your wound(s) (including redness, increased warmth, increased pain, increased drainage, odor, or fever) or have questions about your wound care, please contact the [address] [place] [address] at [phone] Monday-Thursday from 8:00 am - 4:30 pm, or Friday from 8:00 am - 2:30 pm.  If you need help with your wound outside these hours and cannot wait until we are again available, contact your home-care company (if applicable), your PCP, or go to the nearest emergency room

## 2021-06-10 ENCOUNTER — OFFICE VISIT (OUTPATIENT)
Dept: CARDIOLOGY CLINIC | Age: 71
End: 2021-06-10
Payer: MEDICARE

## 2021-06-10 VITALS
SYSTOLIC BLOOD PRESSURE: 130 MMHG | BODY MASS INDEX: 22.48 KG/M2 | HEART RATE: 80 BPM | OXYGEN SATURATION: 99 % | HEIGHT: 70 IN | WEIGHT: 157 LBS | DIASTOLIC BLOOD PRESSURE: 63 MMHG

## 2021-06-10 DIAGNOSIS — R60.0 BILATERAL LEG EDEMA: ICD-10-CM

## 2021-06-10 DIAGNOSIS — E78.2 MIXED HYPERLIPIDEMIA: ICD-10-CM

## 2021-06-10 DIAGNOSIS — Z95.1 HX OF CORONARY ARTERY BYPASS GRAFT: ICD-10-CM

## 2021-06-10 DIAGNOSIS — I08.0 MITRAL VALVE INSUFFICIENCY AND AORTIC VALVE INSUFFICIENCY: ICD-10-CM

## 2021-06-10 DIAGNOSIS — I25.10 CORONARY ARTERY DISEASE INVOLVING NATIVE CORONARY ARTERY OF NATIVE HEART WITHOUT ANGINA PECTORIS: Primary | ICD-10-CM

## 2021-06-10 PROCEDURE — 99214 OFFICE O/P EST MOD 30 MIN: CPT | Performed by: INTERNAL MEDICINE

## 2021-06-10 RX ORDER — CLOPIDOGREL BISULFATE 75 MG/1
75 TABLET ORAL DAILY
Qty: 30 TABLET | Refills: 11 | Status: SHIPPED | OUTPATIENT
Start: 2021-06-10 | End: 2022-02-22 | Stop reason: SDUPTHER

## 2021-06-10 NOTE — LETTER
[facility]   Cardiovascular Evaluation    PATIENT: Kiera Nelson  DATE: 6/10/2021  MRN: [id number]  CSN: [id number]  : 1950      Primary Care Doctor: Leonel Liu MD  Reason for evaluation:   Follow-up, Coronary Artery Disease, Hyperlipidemia, Shortness of Breath (with activity), and Edema (legs)      Subjective:   History of present illness on initial date of evaluation:   Kiera Nelson is a 72 y.o. patient with a history of CAD with NSTEMI, anemia, HLD and DM who presents for hospital follow up. She present to the ER on 21 with fatigue and chest pain. Her echo from 21 showed her EF was 55%. Mild MR and TR. Her cardiac cath from 21 showed severe MV CAD. She underwent CABG x3 with BIMAL obliteration with VICKY Driver on 21. On 05/10/21 she underwent mediastinal exploration and evacuation of Hematoma with DR Rocha. Today she reports she has SOB with activity and leg edema. She sees DR Buckley for a sacral wound. She denies CP, palpitations, dizziness or syncope. She denies bleeding from bowels but has significant bruising. She uses compression stockings typically but didn't today.          Patient Active Problem List   Diagnosis    Rheumatoid arthritis ([address]    Psoriasis    GERD (gastroesophageal reflux disease)    History of tobacco use    Hyponatremia    Anemia    Cylindrical bronchiectasis (HCC)    Tracheobronchomalacia    Immunocompromised state ([address]    Hypokalemia    Coronary artery disease    Stasis edema of both lower extremities    Essential hypertension    Lumbar spondylosis    Mitral valve insufficiency and aortic valve insufficiency    Mixed hyperlipidemia    Myopia of both eyes    Osteoporosis    Other chronic sinusitis    Primary open angle glaucoma (POAG) of both eyes, mild stage    Primary osteoarthritis of right hip    Type 2 diabetes mellitus with unspecified diabetic retinopathy without macular edema ([address]    Uncontrolled type 2 diabetes mellitus with diabetic peripheral angiopathy without gangrene, with long-term current use of insulin (HCC)    Pressure ulcer of coccygeal region, stage 4 (HCC)    NSTEMI (non-ST elevated myocardial infarction) ([address]         Past Medical History:   has a past medical history of Atherosclerosis of native artery of right lower extremity with rest pain ([address] Back pain, Branch retinal vein occlusion, Bronchiectasis with acute exacerbation ([address] Closed compression fracture of thoracic vertebra ([address] Closed fracture of facial bone with routine healing, Closed jaw fracture ([address] Community acquired pneumonia of left lower lobe of lung, Compression fracture of L1 lumbar vertebra (HCC), COPD (chronic obstructive pulmonary disease) ([address] Fracture of tibial plateau, closed, left, initial encounter, Minimally displaced zone I fracture of sacrum ([facility]), Mucus plugging of bronchi, Osteomyelitis of mandible, Osteoporosis with pathological fracture, Post herpetic neuralgia, Proximal humerus fracture, Rheumatoid arthritis ([address] Shingles, Sleep apnea, Temporal arteritis ([address] Tobacco use, Tracheomalacia, and Vitreous hemorrhage, right eye ([address] Surgical History:   has a past surgical history that includes hernia repair; Mandible fracture surgery; Foot surgery; Elbow surgery; Cataract removal; Tubal ligation; Knee arthroscopy; Kyphosis surgery; laminectomy; Spinal fusion; Septoplasty (5/7/2013); Artery surgery (5/30/13); Upper gastrointestinal endoscopy (4/8/2014); bronchoscopy; bronchoscopy (N/A, 6/12/2019); Mandible fracture surgery (02/2020); back surgery (08/2020); other surgical history (01/08/2021); Pressure ulcer debridement (N/A, 1/8/2021); Artery Biopsy (Right, 03/01/2021); Coronary artery bypass graft (N/A, 5/3/2021); Mediastinoscopy (N/A, 5/5/2021); and Cardiac surgery (05/03/2021). Social History:   reports that she quit smoking about 30 years ago.  Her smoking use included mouth daily 30 tablet 11    albuterol sulfate  (90 Base) MCG/ACT inhaler INHALE 2 PUFFS INTO THE LUNGS EVERY 4 HOURS AS NEEDED FOR WHEEZING 1 Inhaler 5    vitamin D (CHOLECALCIFEROL) 1000 UNIT TABS tablet Take 5,000 Units by mouth daily       calcium carbonate (OSCAL) 500 MG TABS tablet Take 500 mg by mouth daily      atorvastatin (LIPITOR) 40 MG tablet Take 40 mg by mouth      cyclobenzaprine (FLEXERIL) 10 MG tablet Take 10 mg by mouth 2 times daily as needed       Insulin Syringe-Needle U-100 31G X 5/16\" 0.5 ML MISC USE 5 TIMES DAILY      meloxicam (MOBIC) 15 MG tablet Patient states taking only as needed      ACCU-CHEK CEDRICK PLUS strip TEST 4 TIMES DAILY  3    aspirin EC 81 MG EC tablet Take 1 tablet by mouth daily      insulin lispro (HUMALOG) 100 UNIT/ML injection vial Inject 0-12 Units into the skin 3 times daily (with meals)      Misc. Devices (ACAPELLA) MISC Take 1 Device by mouth as needed 1 each 0    fluticasone (FLONASE) 50 MCG/ACT nasal spray INHALE 2 SPRAYS IN EACH NOSTRIL DAILY 1 Bottle 5    cetirizine (ZYRTEC) 10 MG tablet Take 10 mg by mouth daily.  etanercept (ENBREL) 50 MG/ML injection Inject 50 mg into the skin every 7 days.  omeprazole (PRILOSEC) 40 MG capsule Take 40 mg by mouth daily        No current facility-administered medications for this visit. Allergies:  Atenolol     Review of Systems:   A 14 point review of symptoms completed. Pertinent positives identified in the HPI, all other review of symptoms negative as below.     Objective:   PHYSICAL EXAM:    Vitals:    06/10/21 1044   BP: 130/63   Pulse: 80   SpO2: 99%    Weight: 157 lb (71.2 kg)     Wt Readings from Last 3 Encounters:   06/10/21 157 lb (71.2 kg)   06/09/21 157 lb 12.8 oz (71.6 kg)   06/02/21 158 lb 12.8 oz (72 kg)         General Appearance:  Alert, cooperative, no distress, appears stated age   Head:  Normocephalic, atraumatic   Eyes:  PERRL, conjunctiva/corneas clear   Nose: Nares normal, no drainage or sinus tenderness   Throat: Lips, mucosa, and tongue normal   Neck: Supple, symmetrical, trachea midline, NL thyroid no carotid bruit or JVD   Lungs:   CTAB, respirations unlabored, well-healed surgical scar   Chest Wall:  No tenderness or deformity   Heart:  Regular rhythm and normal rate; S1, S2 are normal;   no murmur noted; no rub or gallop   Abdomen:   Soft, non-tender, +BS x 4, no masses, no organomegaly   Extremities: Extremities normal, atraumatic, no cyanosis 2+ BLE pitting R>L edema   Pulses: 2+ and symmetric   Skin: Skin color, texture, turgor normal, no rashes or lesions   Pysch: Normal mood and affect   Neurologic: Normal gross motor and sensory exam.         LABS   CBC:      Lab Results   Component Value Date    WBC 5.9 05/10/2021    RBC 2.83 05/10/2021    HGB 8.5 05/10/2021    HCT 25.6 05/10/2021    MCV 90.5 05/10/2021    RDW 16.9 05/10/2021     05/10/2021     CMP:  Lab Results   Component Value Date     05/10/2021    K 4.2 05/10/2021    K 2.7 04/25/2021     05/10/2021    CO2 25 05/10/2021    BUN 11 05/10/2021    CREATININE 0.7 05/10/2021    GFRAA >60 05/10/2021    GFRAA >60 05/07/2013    AGRATIO 0.9 05/03/2021    LABGLOM >60 05/10/2021    GLUCOSE 94 05/10/2021    PROT 6.3 05/03/2021    PROT 7.1 03/21/2013    CALCIUM 8.1 05/10/2021    BILITOT 0.4 05/03/2021    ALKPHOS 123 05/03/2021    AST 18 05/03/2021    ALT 11 05/03/2021     PT/INR:   No results found for: PTINR  Liver:  No components found for: CHLPL  Lab Results   Component Value Date    ALT 11 05/03/2021    AST 18 05/03/2021    ALKPHOS 123 05/03/2021    BILITOT 0.4 05/03/2021     Lab Results   Component Value Date    LABA1C 8.1 05/03/2021     Lipids:         Lab Results   Component Value Date    TRIG 64 05/03/2021    TRIG 110 04/24/2021    TRIG 65 07/22/2010            Lab Results   Component Value Date    HDL 38 (L) 05/03/2021    HDL 30 (L) 04/24/2021    HDL 43 07/22/2010            Lab Results Component Value Date    LDLCALC 35 2021    LDLCALC 29 2021    LDLCALC 106 (H) 2010            Lab Results   Component Value Date    LABVLDL 13 2021    LABVLDL 22 2021    LABVLDL 13 2010         CARDIAC DATA   EK21  SR HR 85    ECHO/MUGA: 21  Normal left ventricle systolic function with an estimated ejection fraction   of 55%. No regional wall motion abnormalities are seen. Normal left ventricular diastolic filling pressure. Mild eccentric aortic regurgitation. Mild mitral and tricuspid regurgitation. Systolic pulmonary artery pressure (SPAP) is normal and estimated at 27 mmHg   (right atrial pressure 3 mmHg). STRESS TEST:    Cath:[facility] 10/7/2016  This is a right dominant coronary arterial system    Left Main coronary artery: distal 30-40% lesion  Left anterior descending coronary artery: ostial 30-40% lesion  Left circumflex coronary artery: Heavily calcified proximal   vessel with ostial 50-60% lesion, mid 50-60% lesion, OM2: normal   caliber vessel, heavily calcified with proximal 50-60% lesion,   OM3: 30-40% proximal lesion  Right coronary artery: heavily calcified vessel, minimal luminal   irregularities, RPDA: small < 2mm caliber vessel with 100%   proximal occlusion and grade II R to R collaterals from an RV   marginal.  Left ventriculogram: Normal wall motion, LVEF = 55%  LVEDP: 4 mmHg  Aortic pressure: 90/50 mmHg    Impression:   2 Vessel CAD  Normal LV function  Normal LVEDP  Normal systemic pressures     CARDIAC CATH: 21  Procedure Findings:  1. Severe multi vessel coronary artery diease              ~not amenable to PCI  2. Normal left ventricular function with EF estimated at 55-60%  3. Normal left heart hemodynamics    CAB21 DR Mary Hernandez  OPERATION PERFORMED:  1.   Urgent coronary bypass grafting surgery x3 with single greater  saphenous vein graft to the posterior ventricular branch of the right  coronary artery, separate single greater saphenous vein graft to the  second obtuse marginal branch of circumflex,  pedicled left internal artery to the LAD. 2.  Left atrial appendage obliteration with 40 mm AtriCure left atrial  clip. 3.  Cardiopulmonary bypass. 4.  Endoscopic vein harvest of the left greater saphenous vein. 5.  Transesophageal echo. 6.  Epiaortic ultrasound. 7.  Doppler verification of grafts. 8.  Bilateral five-level intercostal nerve block with Exparel. 9.  Platelet gel application. 10.  Sternal plating. 05/10/21 DR Rocha  OPERATION PERFORMED:  Mediastinal exploration and evacuation of  hematoma. VASCULAR/OTHER IMAGING:      Assessment and Plan   Kiera Nelson is a 72 y.o. female who presents today for the following problems:      1. CAD: new, improved   - 5/10/2021 3V CABG  2. Normocytic anemia  3. Bilateral lower extremity edema    MD Plan:  1. Patient doing well following coronary bypass surgery. Will refer to [facility] cardiac rehab. 2.  Continue dual antiplatelet therapy for 1 year as tolerated, Lipitor, Lopressor. 3.  Patient is following up with Dr. Vernon Driver in 2 weeks time can discuss nodule at the vein harvest site with him. Does not appear to be infected and likely just scar formation.       Patient Active Problem List   Diagnosis    Rheumatoid arthritis ([address]    Psoriasis    GERD (gastroesophageal reflux disease)    History of tobacco use    Hyponatremia    Anemia    Cylindrical bronchiectasis (HCC)    Tracheobronchomalacia    Immunocompromised state ([address]    Hypokalemia    Coronary artery disease    Stasis edema of both lower extremities    Essential hypertension    Lumbar spondylosis    Mitral valve insufficiency and aortic valve insufficiency    Mixed hyperlipidemia    Myopia of both eyes    Osteoporosis    Other chronic sinusitis    Primary open angle glaucoma (POAG) of both eyes, mild stage    Primary osteoarthritis of right hip    Type 2 diabetes mellitus with unspecified diabetic retinopathy without macular edema (HCC)    Uncontrolled type 2 diabetes mellitus with diabetic peripheral angiopathy without gangrene, with long-term current use of insulin (HCC)    Pressure ulcer of coccygeal region, stage 4 ([address]    NSTEMI (non-ST elevated myocardial infarction) [facility])       Patient Plan:  1. Cardiac rehab at [facility]  2. Follow up with NP in 6 months      It is a pleasure to assist in the care of Demario HendrixBanners . Please call with any questions. Scribe's attestation: This note was scribed in the presence of Dr. Radha Sellers by Anni Godwin MD, personally performed the services described in this documentation as scribed by the above signed scribe in my presence, and it is both accurate and complete to the best of our ability and knowledge. I agree with the details independently gathered by my clinical support staff, while the remaining scribed note accurately describes my personal service to the patient. The above RN is working as a scribe for and in the presence of myself . Working as a scribe, the RN may have prepopulated components of this note with my historical intellectual property under my direct supervision. Any additions to this intellectual property were performed at my direction. Furthermore, the content and accuracy of this note have been reviewed by me to the best of my ability.          Radha Sellers MD, [facility] Cardiologist  [address]  [phone] [facility]  [phone] [address] [place] [address]

## 2021-06-14 NOTE — PROGRESS NOTES
[facility] Progress Note    Speedy Chao     : 1950    DATE OF VISIT:  2021    Subjective:     Speedy Chao is a 72 y.o. female who has a pressure ulcer located on the sacrum. Significant symptoms or pertinent wound history since last visit: feeling ok overall, slowly more energy since her CABG; doing well with VAC, some mild skin irritation, but better than last week. Eating well, no fever, no diarrhea. Additional ulcer(s) noted? no.      Her current medication list consists of Acapella, DULoxetine, Insulin Syringe-Needle U-100, Roflumilast, Teriparatide (Recombinant), albuterol sulfate HFA, aspirin EC, atorvastatin, azithromycin, blood glucose test strips, calcium carbonate, cetirizine, clopidogrel, cyclobenzaprine, docusate sodium, etanercept, fluticasone, gabapentin, insulin glargine, insulin lispro, ipratropium, latanoprost, meloxicam, metoprolol tartrate, metroNIDAZOLE, morphine, naloxone, omeprazole, oxyCODONE-acetaminophen, predniSONE, and vitamin D. Allergies: Atenolol    Objective:     Vitals:    21 0915   BP: 136/68   Pulse: 98   Resp: 16   Temp: 99.3 °F (37.4 °C)   TempSrc: Oral   Weight: 157 lb 12.8 oz (71.6 kg)   Height: 5' 10\" (1.778 m)     AAOx3, fatigued, NAD  No cellulitis, angitis, fluctuance  No regional acute arthritis or bursitis  No contact dermatitis or cutaneous Candidiasis  Blanca-ulcer skin: indurated, pink, warm and dry. Ulcer(s): mostly red and granular, some areas more pink and fibrotic, some fibrin and biofilm, still that one deper pocket with a bit of SQ-layer fibrotic tissue that is preventing complete granulation of the wound bed; no signs of infection, no bone exposure. Photos also saved in electronic chart.     Today's wound measurements, per RN documentation:  Wound 20 #2, Sacrum, Pressure Injury, Stage 4, Onset 2020-Wound Length (cm): 3.2 cm    Wound 20 #2, Sacrum, Pressure Injury, Stage 4, Onset 2020-Wound Width (cm): 1.5 cm    Wound 12/18/20 #2, Sacrum, Pressure Injury, Stage 4, Onset 5/2020-Wound Depth (cm): 1.3 cm    Assessment:     Patient Active Problem List   Diagnosis Code    Rheumatoid arthritis ([facility] M06.9    Psoriasis L40.9    GERD (gastroesophageal reflux disease) K21.9    History of tobacco use Z87.891    Hyponatremia E87.1    Anemia D64.9    Cylindrical bronchiectasis (HCC) J47.9    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    Immunocompromised state ([facility] D84.9    Hypokalemia E87.6    Coronary artery disease I25.10    Stasis edema of both lower extremities I87.303    Essential hypertension I10    Lumbar spondylosis M47.816    Mitral valve insufficiency and aortic valve insufficiency I08.0    Mixed hyperlipidemia E78.2    Myopia of both eyes H52.13    Osteoporosis M81.0    Other chronic sinusitis J32.8    Primary open angle glaucoma (POAG) of both eyes, mild stage H40.1131    Primary osteoarthritis of right hip M16.11    Type 2 diabetes mellitus with unspecified diabetic retinopathy without macular edema ([facility]) E11.319    Uncontrolled type 2 diabetes mellitus with diabetic peripheral angiopathy without gangrene, with long-term current use of insulin ([facility]) E11.51, E11.65, Z79.4    Pressure ulcer of coccygeal region, stage 4 ([facility] L89.154    NSTEMI (non-ST elevated myocardial infarction) ([facility] I21.4       Assessment of today's active condition(s): RA, stage 4 sacral pressure ulcer, no signs of infection, recent stalled and minor setback in healing when she was admitted with an acute coronary syndrome, taken to the OR for CABG, in the ICU for a time, etc. Making some progress to get back on track with healing now. Factors contributing to occurrence and/or persistence of the chronic ulcer include diabetes, chronic pressure, decreased mobility, shear force and immunosuppression. Medical necessity of today's visit is shown by the above documentation.  Sharp debridement is indicated today, based upon the exam findings in the wound(s) above. Procedure note:     Consent obtained. Time out performed per [facility]. Anesthetic  Anesthetic: 4% Lidocaine Cream     Using a curette, I sharply debrided the sacrum ulcer(s) down through and including the removal of subcutaneous tissue. The type(s) of tissue debrided included fibrin, biofilm and necrotic/eschar. Total Surface Area Debrided: 5 sq cm. The ulcers were then irrigated with normal saline solution. The procedure was completed with a small amount of bleeding, and hemostasis was with pressure. The patient tolerated the procedure well, with no significant complications. The patient's level of pain during and after the procedure was monitored. Post-debridement measurements, if different from pre-debridement, are in the flowsheet as well. Discharge plan:     Treatment in the wound care center today, per RN documentation: Wound 12/18/20 #2, Sacrum, Pressure Injury, Stage 4, Onset 5/2020-Dressing/Treatment:  (Flagyl, collagen w/ AG, NPWT). Keep up efforts at glucose control, protein intake, offloading (ROHO cushion when seated, side-lying in bed). Home treatment: good handwashing before and after any dressing changes. Cleanse wound with saline or soap & water before dressing change. May use Vaseline (petrolatum), Aquaphor, Aveeno, CeraVe, Cetaphil, Eucerin, Lubriderm, etc for dry skin. Dressing type for home: Hypochlorous acid spray, Flagyl powder, Kathia and NPWT (black foam, standard drape, bridge to hip, 150 mmHg continuous negative pressure), three times weekly. Written discharge instructions given to patient. Follow up in 1 week.     Electronically signed by Traci Lindsey MD on 6/14/2021 at 12:11 PM.

## 2021-06-16 ENCOUNTER — HOSPITAL ENCOUNTER (OUTPATIENT)
Dept: GENERAL RADIOLOGY | Age: 71
Discharge: HOME | End: 2021-06-16
Payer: MEDICARE

## 2021-06-16 ENCOUNTER — HOSPITAL ENCOUNTER (OUTPATIENT)
Dept: WOUND CARE | Age: 71
Discharge: HOME | End: 2021-06-16
Payer: MEDICARE

## 2021-06-16 VITALS
BODY MASS INDEX: 22.12 KG/M2 | HEART RATE: 95 BPM | HEIGHT: 70 IN | DIASTOLIC BLOOD PRESSURE: 67 MMHG | TEMPERATURE: 97.5 F | RESPIRATION RATE: 18 BRPM | WEIGHT: 154.5 LBS | SYSTOLIC BLOOD PRESSURE: 117 MMHG

## 2021-06-16 DIAGNOSIS — L89.154 PRESSURE ULCER OF COCCYGEAL REGION, STAGE 4 (HCC): Primary | ICD-10-CM

## 2021-06-16 DIAGNOSIS — L89.154 PRESSURE ULCER OF COCCYGEAL REGION, STAGE 4 (HCC): ICD-10-CM

## 2021-06-16 PROCEDURE — 11042 DBRDMT SUBQ TIS 1ST 20SQCM/<: CPT

## 2021-06-16 PROCEDURE — 72220 X-RAY EXAM SACRUM TAILBONE: CPT

## 2021-06-16 PROCEDURE — 11042 DBRDMT SUBQ TIS 1ST 20SQCM/<: CPT | Performed by: INTERNAL MEDICINE

## 2021-06-16 PROCEDURE — 97605 NEG PRS WND THER DME<=50SQCM: CPT

## 2021-06-16 RX ORDER — BACITRACIN ZINC AND POLYMYXIN B SULFATE 500; 1000 [USP'U]/G; [USP'U]/G
OINTMENT TOPICAL ONCE
Status: DISCONTINUED | OUTPATIENT
Start: 2021-06-16 | End: 2021-06-17 | Stop reason: HOSPADM

## 2021-06-16 RX ORDER — BACITRACIN ZINC AND POLYMYXIN B SULFATE 500; 1000 [USP'U]/G; [USP'U]/G
OINTMENT TOPICAL ONCE
Status: CANCELLED | OUTPATIENT
Start: 2021-06-16 | End: 2021-06-16

## 2021-06-16 RX ORDER — LIDOCAINE 40 MG/G
CREAM TOPICAL ONCE
Status: DISCONTINUED | OUTPATIENT
Start: 2021-06-16 | End: 2021-06-17 | Stop reason: HOSPADM

## 2021-06-16 RX ORDER — LIDOCAINE HYDROCHLORIDE 40 MG/ML
SOLUTION TOPICAL ONCE
Status: CANCELLED | OUTPATIENT
Start: 2021-06-16 | End: 2021-06-16

## 2021-06-16 RX ORDER — LIDOCAINE 40 MG/G
CREAM TOPICAL ONCE
Status: CANCELLED | OUTPATIENT
Start: 2021-06-16 | End: 2021-06-16

## 2021-06-16 RX ORDER — LIDOCAINE 50 MG/G
OINTMENT TOPICAL ONCE
Status: CANCELLED | OUTPATIENT
Start: 2021-06-16 | End: 2021-06-16

## 2021-06-16 ASSESSMENT — PAIN SCALES - GENERAL
PAINLEVEL_OUTOF10: 0
PAINLEVEL_OUTOF10: 0

## 2021-06-16 NOTE — PLAN OF CARE
[facility] Physician Orders and Discharge [address], [address]  [place], [facility]  Telephone: [phone]      Fax: [phone]        Your home care [address]     Your wound-care supplies will be provided by: We are ordering your wound-care supplies from [employer]. Please note, depending on your insurance coverage, you may have out-of-pocket expenses for these supplies. Someone from [employer] should call you to confirm your order and discuss those potential costs before they ship your products -- please anticipate that call. If your out-of-pocket cost could be substantial, [facility] has a financial hardship program for patients who qualify, so please ask about that if you might need a hand. If you have any questions about your supplies or your potential out-of-pocket costs, or if you need to place an order for a refill of supplies (typically monthly), please call [phone].      NAME:  Gris Taveras   DATE of BIRTH:  1950  PRIMARY DIAGNOSIS FOR WOUND CARE CENTER:  Pressure ulcer     Wound cleansing:   Do not scrub or use excessive force. Wash hands with soap and water before and after dressing changes. Prior to applying a clean dressing, cleanse wound with normal saline, wound cleanser, or mild soap and water.  Ask your physician or nurse before getting the wound(s) wet in the shower.                Wound care for home:     Sacral Wound:    Skin prep & Hydrocolloid to maegan-wound skin irritation to protect from drape   Vashe or Hypochlorous acid soaked gauze applied to wound for 2-3 minutes, no need to rinse  Flagyl crushed & sprinkled on wound bed as needed for malodor  Stoma paste or Stoma barrier ring to distal area of wound to help obtain a better seal & prevent air leak  DRAPE to maegan-wound & [address] foam to wound & bridged to left hip (MAKE SURE NOT Appointment is with Dr. Joey Forbes in 2 weeks on                                                at                       .     Your nurse  is ABRAHAM Zambrano      If we applied slip-resistant hospital socks today, be sure to remove them at least once a day to inspect your toes or feet, even if you're not changing the wraps or dressings underneath.  If you see anything concerning (redness, excess moisture, etc), please call and let us know right away.     Should you experience any significant changes in your wound(s) (including redness, increased warmth, increased pain, increased drainage, odor, or fever) or have questions about your wound care, please contact the [facility] at [phone] Monday-Thursday from 8:00 am - 4:30 pm, or Friday from 8:00 am - 2:30 pm.  If you need help with your wound outside these hours and cannot wait until we are again available, contact your home-care company (if applicable), your PCP, or go to the nearest emergency room

## 2021-06-16 NOTE — PLAN OF CARE
Wound debridement per MD & pt tolerated well. Wound remains with deeper area of concern & pt. States she does have discomfort in wound at times. Will obtain x-ray of wound today to evaluate for osteomyelitis. Dr Shayna Hill advised he will be in touch with x-ray results & may need to obtain MRI if x-ray results are negative for osteomyelitis. Will cont. With current wound care regime with dressing changes as ordered. F/u in [address] [place] [address] in 2 weeks as ordered. Discharge instructions reviewed with patient, all questions answered, copy given to patient. Dressings were applied to all wounds per M.D. Instructions at this visit.

## 2021-06-22 ENCOUNTER — OFFICE VISIT (OUTPATIENT)
Dept: CARDIOTHORACIC SURGERY | Age: 71
End: 2021-06-22

## 2021-06-22 VITALS
HEART RATE: 80 BPM | DIASTOLIC BLOOD PRESSURE: 60 MMHG | OXYGEN SATURATION: 100 % | WEIGHT: 156.6 LBS | TEMPERATURE: 98.5 F | BODY MASS INDEX: 22.42 KG/M2 | HEIGHT: 70 IN | SYSTOLIC BLOOD PRESSURE: 124 MMHG

## 2021-06-22 DIAGNOSIS — I10 ESSENTIAL HYPERTENSION: ICD-10-CM

## 2021-06-22 DIAGNOSIS — Z87.891 HISTORY OF TOBACCO USE: ICD-10-CM

## 2021-06-22 DIAGNOSIS — I25.10 CORONARY ARTERY DISEASE INVOLVING NATIVE CORONARY ARTERY OF NATIVE HEART WITHOUT ANGINA PECTORIS: ICD-10-CM

## 2021-06-22 DIAGNOSIS — Z48.812 AFTERCARE FOLLOWING SURGERY OF THE CIRCULATORY SYSTEM: Primary | ICD-10-CM

## 2021-06-22 DIAGNOSIS — Z95.1 S/P CABG X 3: ICD-10-CM

## 2021-06-22 PROCEDURE — 99024 POSTOP FOLLOW-UP VISIT: CPT | Performed by: THORACIC SURGERY (CARDIOTHORACIC VASCULAR SURGERY)

## 2021-06-22 NOTE — PROGRESS NOTES
Progress Note    CC:  Postoperative follow-up    S/P    CABG surgery. Subjective:    She feels like she is doing very well. She still has some swelling around her ankles which she has had before surgery and for which she took torsemide on a regular basis. She has no chest pain. Her eating and sleeping habits are improving appropriately. She occasionally has a cough and some dyspnea but she is also known to have COPD and has had that before. She uses CPAP at home. Vital Signs:                                                 /60 (Site: Left Upper Arm, Position: Sitting, Cuff Size: Medium Adult)   Pulse 80   Temp 98.5 °F (36.9 °C) (Temporal)   Ht 5' 10\" (1.778 m)   Wt 156 lb 9.6 oz (71 kg)   SpO2 100%   BMI 22.47 kg/m²        CV:   Regular rate and rhythm with no rubs or murmurs. Pulm: Clear lung fields with no rales or rhonchi. Incisions:    Sternal incision is clean, dry and intact. Sternum is stable. Abd:  Soft  Ext: Leg incision is clean, dry and intact. 2-3+ peripheral edema bilaterally. Assessment/Plan:  Overall doing very well post CABG surgery. I discussed secondary risk prevention for cardiovascular disease with the patient. The patient has received a copy of my protocol for the secondary risk prevention of cardiovascular disease. The patient may increase activities as she feels comfortable doing so. Follow-up with her PCP, Dr. Emmie Harris and Dr. Julianna Cui as prescribed. Follow-up with me as needed.     Sumi Walters MD  6/22/2021  10:20 AM

## 2021-06-23 ENCOUNTER — HOSPITAL ENCOUNTER (OUTPATIENT)
Dept: MRI IMAGING | Age: 71
Discharge: HOME | End: 2021-06-23
Payer: MEDICARE

## 2021-06-23 DIAGNOSIS — L89.154 PRESSURE ULCER OF COCCYGEAL REGION, STAGE 4 (HCC): ICD-10-CM

## 2021-06-23 PROCEDURE — 72195 MRI PELVIS W/O DYE: CPT

## 2021-06-30 ENCOUNTER — HOSPITAL ENCOUNTER (OUTPATIENT)
Dept: WOUND CARE | Age: 71
Discharge: HOME | End: 2021-06-30
Payer: MEDICARE

## 2021-06-30 VITALS
DIASTOLIC BLOOD PRESSURE: 78 MMHG | TEMPERATURE: 97.3 F | SYSTOLIC BLOOD PRESSURE: 127 MMHG | BODY MASS INDEX: 22.3 KG/M2 | HEART RATE: 88 BPM | WEIGHT: 155.8 LBS | HEIGHT: 70 IN | RESPIRATION RATE: 20 BRPM

## 2021-06-30 DIAGNOSIS — L89.154 PRESSURE ULCER OF COCCYGEAL REGION, STAGE 4 (HCC): Primary | ICD-10-CM

## 2021-06-30 PROCEDURE — 11043 DBRDMT MUSC&/FSCA 1ST 20/<: CPT

## 2021-06-30 PROCEDURE — 11043 DBRDMT MUSC&/FSCA 1ST 20/<: CPT | Performed by: INTERNAL MEDICINE

## 2021-06-30 RX ORDER — LIDOCAINE 40 MG/G
CREAM TOPICAL ONCE
Status: DISCONTINUED | OUTPATIENT
Start: 2021-06-30 | End: 2021-07-01 | Stop reason: HOSPADM

## 2021-06-30 RX ORDER — BACITRACIN ZINC AND POLYMYXIN B SULFATE 500; 1000 [USP'U]/G; [USP'U]/G
OINTMENT TOPICAL ONCE
Status: DISCONTINUED | OUTPATIENT
Start: 2021-06-30 | End: 2021-07-01 | Stop reason: HOSPADM

## 2021-06-30 RX ORDER — LIDOCAINE 40 MG/G
CREAM TOPICAL ONCE
Status: CANCELLED | OUTPATIENT
Start: 2021-06-30 | End: 2021-06-30

## 2021-06-30 RX ORDER — LIDOCAINE 50 MG/G
OINTMENT TOPICAL ONCE
Status: CANCELLED | OUTPATIENT
Start: 2021-06-30 | End: 2021-06-30

## 2021-06-30 RX ORDER — LIDOCAINE HYDROCHLORIDE 40 MG/ML
SOLUTION TOPICAL ONCE
Status: CANCELLED | OUTPATIENT
Start: 2021-06-30 | End: 2021-06-30

## 2021-06-30 RX ORDER — BACITRACIN ZINC AND POLYMYXIN B SULFATE 500; 1000 [USP'U]/G; [USP'U]/G
OINTMENT TOPICAL ONCE
Status: CANCELLED | OUTPATIENT
Start: 2021-06-30 | End: 2021-06-30

## 2021-06-30 ASSESSMENT — PAIN SCALES - GENERAL
PAINLEVEL_OUTOF10: 0
PAINLEVEL_OUTOF10: 0

## 2021-06-30 NOTE — PLAN OF CARE
Wound injected with 2% Lidocaine with epinephrine & debrided per MD, pt. Tolerated well without c/o. Will hold NPWT until Friday 7/2/2021, then resume as previous. Dressing applied today per MD & to remain in place until Friday. Pt. States she is using ROHO cushion for off-loading & frequently repositioning. Pt. Also taking Ensure for nutritional supplementation. F/u in [address] [place] [address] in 1 week as ordered, pt. Aware to call sooner with any changes or questions/concerns. Discharge instructions reviewed with patient, all questions answered, copy given to patient. Dressings were applied to all wounds per M.D. Instructions at this visit.

## 2021-07-01 NOTE — PROGRESS NOTES
[facility] Progress Note    Consuelo Yin     : 1950    DATE OF VISIT:  2021    Subjective:     Consuelo Yin is a 72 y.o. female who has a pressure ulcer located on the sacrum. Significant symptoms or pertinent wound history since last visit: feeling ok overall, some pain in the wound at times, nothing too severe. Her XR from her last visit here was pretty unrevealing, so we arranged for an MRI to make sure there is no underlying osteomyelitis (given a focal area of pain, fibrosis and more stagnant scar tissue around that), and that was thankfully negative as well. No F/C/D; appetite is doing well; slowly feeling stronger after her still-fairly-recent cardiac surgery. NPWT working well. Using her ROHO cushion and definitely feels some pressure relief with that. Lying on her sides in bed. Additional ulcer(s) noted? no.      Her current medication list consists of Acapella, DULoxetine, Insulin Syringe-Needle U-100, Roflumilast, Teriparatide (Recombinant), albuterol sulfate HFA, aspirin EC, atorvastatin, azithromycin, blood glucose test strips, calcium carbonate, cetirizine, clopidogrel, cyclobenzaprine, docusate sodium, etanercept, fluticasone, gabapentin, insulin glargine, insulin lispro, ipratropium, latanoprost, meloxicam, metoprolol tartrate, metroNIDAZOLE, morphine, naloxone, omeprazole, oxyCODONE-acetaminophen, predniSONE, and vitamin D. Allergies: Atenolol    Objective:     Vitals:    21 0915   BP: 127/78   Pulse: 88   Resp: 20   Temp: 97.3 °F (36.3 °C)   TempSrc: Oral   Weight: 155 lb 12.8 oz (70.7 kg)   Height: 5' 10\" (1.778 m)     AAOx3, fatigued, NAD  No cellulitis, angitis, fluctuance  No regional acute arthritis or bursitis   No cutaneous Candidiasis, mild contact dermatitis from VAC drape  Blanca-ulcer skin: indurated, pink, warm and dry.   Ulcer(s): mostly pink-red and granular, but still not quite as robustly granular as it was before her recent hospital admission; still an area of undermining to one side; still that one more central nodular area of fibrotic tissue, with that small, bland focus of probably fascial-layer tissue in the center; some fibrin and biofilm over the surface, serous exudate, no pus or other signs of soft tissue infection. Photos also saved in electronic chart. Today's wound measurements, per RN documentation:  Wound 12/18/20 #2, Sacrum, Pressure Injury, Stage 4, Onset 5/2020-Wound Length (cm): 3.5 cm    Wound 12/18/20 #2, Sacrum, Pressure Injury, Stage 4, Onset 5/2020-Wound Width (cm): 1.5 cm    Wound 12/18/20 #2, Sacrum, Pressure Injury, Stage 4, Onset 5/2020-Wound Depth (cm): 1.4 cm   _______________    XR from two weeks ago -- No acute finding of the sacrum or coccyx. Multiple compression fractures in the lumbar spine with several levels of kyphoplasty that appear stable. MRI from this past week -- Deep sacral decubitus ulceration which closely approximates the underlying bone. Surrounding soft tissue edema and thickening of the adjacent soft tissues likely reflecting cellulitis and soft tissue necrosis along the borders of the ulceration. No organized drainable fluid collection identified. No evidence for osteomyelitis.     Assessment:     Patient Active Problem List   Diagnosis Code    Rheumatoid arthritis ([facility] M06.9    Psoriasis L40.9    GERD (gastroesophageal reflux disease) K21.9    History of tobacco use Z87.891    Hyponatremia E87.1    Anemia D64.9    Cylindrical bronchiectasis (HCC) J47.9    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    Immunocompromised state ([address] D84.9    Hypokalemia E87.6    Coronary artery disease I25.10    Stasis edema of both lower extremities I87.303    Essential hypertension I10    Lumbar spondylosis M47.816    Mitral valve insufficiency and aortic valve insufficiency I08.0    Mixed hyperlipidemia E78.2    Myopia of both eyes H52.13    Osteoporosis M81.0    Other chronic sinusitis J32.8    Primary open angle glaucoma (POAG) of both eyes, mild stage H40.1131    Primary osteoarthritis of right hip M16.11    Type 2 diabetes mellitus with unspecified diabetic retinopathy without macular edema (HCC) E11.319    Uncontrolled type 2 diabetes mellitus with diabetic peripheral angiopathy without gangrene, with long-term current use of insulin (HCC) E11.51, E11.65, Z79.4    Pressure ulcer of coccygeal region, stage 4 ([address] L89.154    NSTEMI (non-ST elevated myocardial infarction) ([facility] [address] I21.4       Assessment of today's active condition(s): RA, stage 4 sacral pressure ulcer, had been making some slow, steady progress toward healing with OR excision, offloading, nutrition support and NPWT, but then stalled a bit after a two-week hospitalization for unrelated (cardiac) reasons. Some clinical concern for possible osteo in one area (pain and fibrosis), but MRI was negative, so I think we need to move forward with more aggressive excisional debridement of that area of fibrotic tissue, then continue to try to push it forward with NPWT + the basics of pressure ulcer care. Factors contributing to occurrence and/or persistence of the chronic ulcer include diabetes, chronic pressure, decreased mobility, shear force and immunosuppression. Medical necessity of today's visit is shown by the above documentation. Sharp debridement is indicated today, based upon the exam findings in the wound(s) above. Procedure note:     Consent obtained. Time out performed per [facility] policy. Anesthetic  Anesthetic: 2% Lidocaine Injectable with Epinephrine (3cc's injected per Dr Varinder Stiles, into that central area of most dense and nodular fibrotic tissue) -- this was following some topical 4% lidocaine cream on the surface. Using a curette, scissors and forceps, I sharply debrided the sacrum ulcer(s) down through and including the removal of muscle/fascia.  The type(s) of tissue debrided included fibrin, biofilm and necrotic/eschar. Total Surface Area Debrided: 5 sq cm. I was able to basically completely excise that most nodular scar tissue there, with a bit of identifiable fat and fascial tissue at its base. No abscess found, no bone exposed, and I get a sense that the wound base ought to be able to granulate in a bit more evenly now. The ulcers were then irrigated with normal saline solution. The procedure was completed with a small amount of bleeding, and hemostasis was with pressure and with ORC (cellulose). The patient tolerated the procedure well, with no significant complications. The patient's level of pain during and after the procedure was monitored. Post-debridement measurements, if different from pre-debridement, are in the flowsheet as well. Discharge plan:     Treatment in the wound care center today, per RN documentation: Wound 12/18/20 #2, Sacrum, Pressure Injury, Stage 4, Onset 5/2020-Dressing/Treatment: Other (comment) (Vashe,Skin prep, Opticell Ag,Mepilex Sacral Border). Just pausing NPWT for 48 hours, in case there could be a risk of increased bleeding right now, between the deeper debridement today, the negative pressure, and a couple of transfers later today. Keep up good work with protein intake, diabetic diet, good offloading practices. Home treatment: good handwashing before and after any dressing changes. Cleanse wound with saline or soap & water before dressing change. May use Vaseline (petrolatum), Aquaphor, Aveeno, CeraVe, Cetaphil, Eucerin, Lubriderm, etc for dry skin. Dressing type for home: starting back on Friday, hypochlorous acid spray, collagen, NPWT (black foam, 125 mmHg continous negative pressure, bridge toward a hip, standard drape), three times weekly. Written discharge instructions given to patient. Follow up in 1 week.     Electronically signed by Williams Moran MD on 7/1/2021 at 3:03 PM.

## 2021-07-07 ENCOUNTER — HOSPITAL ENCOUNTER (OUTPATIENT)
Dept: WOUND CARE | Age: 71
Discharge: HOME | End: 2021-07-07
Payer: MEDICARE

## 2021-07-07 VITALS
HEIGHT: 70 IN | HEART RATE: 81 BPM | DIASTOLIC BLOOD PRESSURE: 53 MMHG | TEMPERATURE: 99.1 F | SYSTOLIC BLOOD PRESSURE: 98 MMHG | BODY MASS INDEX: 22.13 KG/M2 | WEIGHT: 154.6 LBS | RESPIRATION RATE: 20 BRPM

## 2021-07-07 DIAGNOSIS — L89.154 PRESSURE ULCER OF COCCYGEAL REGION, STAGE 4 (HCC): Primary | ICD-10-CM

## 2021-07-07 DIAGNOSIS — T81.31XA SURGICAL WOUND DEHISCENCE, INITIAL ENCOUNTER: ICD-10-CM

## 2021-07-07 PROCEDURE — 87070 CULTURE OTHR SPECIMN AEROBIC: CPT

## 2021-07-07 PROCEDURE — 97605 NEG PRS WND THER DME<=50SQCM: CPT

## 2021-07-07 PROCEDURE — 11042 DBRDMT SUBQ TIS 1ST 20SQCM/<: CPT | Performed by: INTERNAL MEDICINE

## 2021-07-07 PROCEDURE — 87186 SC STD MICRODIL/AGAR DIL: CPT

## 2021-07-07 PROCEDURE — 11042 DBRDMT SUBQ TIS 1ST 20SQCM/<: CPT

## 2021-07-07 PROCEDURE — 87205 SMEAR GRAM STAIN: CPT

## 2021-07-07 PROCEDURE — 87077 CULTURE AEROBIC IDENTIFY: CPT

## 2021-07-07 PROCEDURE — 11043 DBRDMT MUSC&/FSCA 1ST 20/<: CPT | Performed by: INTERNAL MEDICINE

## 2021-07-07 PROCEDURE — 11043 DBRDMT MUSC&/FSCA 1ST 20/<: CPT

## 2021-07-07 RX ORDER — LIDOCAINE HYDROCHLORIDE 40 MG/ML
SOLUTION TOPICAL ONCE
Status: CANCELLED | OUTPATIENT
Start: 2021-07-07 | End: 2021-07-07

## 2021-07-07 RX ORDER — SULFAMETHOXAZOLE AND TRIMETHOPRIM 800; 160 MG/1; MG/1
1 TABLET ORAL 2 TIMES DAILY
Qty: 14 TABLET | Refills: 0 | Status: SHIPPED | OUTPATIENT
Start: 2021-07-07 | End: 2021-07-09 | Stop reason: ALTCHOICE

## 2021-07-07 RX ORDER — LIDOCAINE 40 MG/G
CREAM TOPICAL ONCE
Status: DISCONTINUED | OUTPATIENT
Start: 2021-07-07 | End: 2021-07-08 | Stop reason: HOSPADM

## 2021-07-07 RX ORDER — LIDOCAINE 50 MG/G
OINTMENT TOPICAL ONCE
Status: CANCELLED | OUTPATIENT
Start: 2021-07-07 | End: 2021-07-07

## 2021-07-07 RX ORDER — LIDOCAINE 40 MG/G
CREAM TOPICAL ONCE
Status: CANCELLED | OUTPATIENT
Start: 2021-07-07 | End: 2021-07-07

## 2021-07-07 RX ORDER — LEVOFLOXACIN 500 MG/1
500 TABLET, FILM COATED ORAL DAILY
Qty: 7 TABLET | Refills: 0 | Status: ON HOLD | OUTPATIENT
Start: 2021-07-07 | End: 2021-07-15 | Stop reason: HOSPADM

## 2021-07-07 RX ORDER — BACITRACIN ZINC AND POLYMYXIN B SULFATE 500; 1000 [USP'U]/G; [USP'U]/G
OINTMENT TOPICAL ONCE
Status: CANCELLED | OUTPATIENT
Start: 2021-07-07 | End: 2021-07-07

## 2021-07-07 ASSESSMENT — PAIN DESCRIPTION - DESCRIPTORS: DESCRIPTORS: SHARP;TIGHTNESS

## 2021-07-07 ASSESSMENT — PAIN DESCRIPTION - DIRECTION: RADIATING_TOWARDS: DENIES

## 2021-07-07 ASSESSMENT — PAIN DESCRIPTION - ONSET: ONSET: ON-GOING

## 2021-07-07 ASSESSMENT — PAIN DESCRIPTION - PROGRESSION: CLINICAL_PROGRESSION: GRADUALLY WORSENING

## 2021-07-07 ASSESSMENT — PAIN - FUNCTIONAL ASSESSMENT: PAIN_FUNCTIONAL_ASSESSMENT: ACTIVITIES ARE NOT PREVENTED

## 2021-07-07 ASSESSMENT — PAIN DESCRIPTION - FREQUENCY: FREQUENCY: INTERMITTENT

## 2021-07-07 NOTE — PLAN OF CARE
New wound on LLE, culture obtained today and oral antibiotics called in to pharmacy. No changes in NPWT dressing to sacral wound. Discharge instructions reviewed with patient, all questions answered, copy given to patient. Dressings were applied to all wounds per M.D. Instructions at this visit.

## 2021-07-07 NOTE — PROGRESS NOTES
[address] [facility] Physician Orders and Discharge [address]  [address], [address]  [place], [facility]  Telephone: [phone]      Fax: [phone]        Your home care [address]     Your wound-care supplies will be provided by: We are ordering your wound-care supplies from [employer]. Please note, depending on your insurance coverage, you may have out-of-pocket expenses for these supplies. Someone from [facility] [employer] should call you to confirm your order and discuss those potential costs before they ship your products -- please anticipate that call. If your out-of-pocket cost could be substantial, [facility] has a financial hardship program for patients who qualify, so please ask about that if you might need a hand. If you have any questions about your supplies or your potential out-of-pocket costs, or if you need to place an order for a refill of supplies (typically monthly), please call [phone].      NAME:  Gris Taveras   DATE of BIRTH:  1950  PRIMARY DIAGNOSIS FOR WOUND CARE CENTER:  Pressure ulcer     Wound cleansing:   Do not scrub or use excessive force. Wash hands with soap and water before and after dressing changes. Prior to applying a clean dressing, cleanse wound with normal saline, wound cleanser, or mild soap and water.  Ask your physician or nurse before getting the wound(s) wet in the shower.                Wound care for home:     LEFT MEDIAL LOWER LEG WOUND:  Vashe to wound  Apply Triad to wound and slightly on skin around the wound (you can purchase a small tube of this at the pharmacy)  4x4's  Medium Spandagrip  Change dressing every day    Sacral Wound:   Skin prep & Hydrocolloid to maegan-wound skin irritation to protect from drape   Vashe or Hypochlorous acid soaked gauze applied to wound for 2-3 minutes, no need to rinse  Flagyl crushed & sprinkled on wound bed as needed for malodor  Stoma paste or Stoma barrier ring to distal area of wound to help obtain a better seal & prevent air leak  DRAPE to maegan-wound & [address] (send remainder home with patient today)  Black foam-IT HAS TO BE ON THE ACTUAL WOUND & bridged to left hip (MAKE SURE NOT TO OVER FILL UNDERMINED AREA WITH BLACK FOAM)  Cover with drape  Pressure -150mmHg continuous  HHC to change dressing on Friday, Monday & Wednesday         Please note, all wounds (unless stated otherwise here) were mechanically debrided at the time of cleansing here in the wound-care center today, so a small amount of pain, drainage or bleeding from that process might be expected, and is normal.      All products for home use, including multiple products for a single wound if applicable, are medically necessary in order to achieve the best chance at timely wound healing. See provider documentation for details if needed.     Substituted dressings applied in the [facility] today, if applicable:     N/a     New orders for this week (labs, imaging, medications, etc.):    Wound culture obtained today from leg wound, Dr. Marita Rogers is calling in antibiotics to your pharmacy, you should pick them up today and begin taking as prescribed        [address] [employer] to order dressing supplies through [address] [place] [address] when needed. May contact the local rep. For any questions or to order supplies  Florencia Ann # [phone]      Additional instructions for specific diagnoses:     +ROHO cushion- use at [address] when sitting! !      General comments for pressure ulcers:  *  Make sure you stay well-hydrated, and maintain good protein intake. *  Reposition at least every two hours, to keep from having pressure in one spot for too long. *  If you are not sure whether you have the best offloading surfaces (mattress, mattress overlay, chair cushion, heel-protector boots, etc), please ask.   *  Moisturize your skin regularly with Vaseline, Aquaphor, Aveeno, CeraVe, Cetaphil, Eucerin, Lubriderm, etc; but keep the skin between your toes dry. *  If you smoke, your wound can not heal properly -- please talk with us when you're ready to quit.         F/U Appointment is with Dr. Luis Burris in 1 week on                                                at                       .     Your nurse  is ABRAHAM Zambrano      If we applied slip-resistant hospital socks today, be sure to remove them at least once a day to inspect your toes or feet, even if you're not changing the wraps or dressings underneath. If you see anything concerning (redness, excess moisture, etc), please call and let us know right away.     Should you experience any significant changes in your wound(s) (including redness, increased warmth, increased pain, increased drainage, odor, or fever) or have questions about your wound care, please contact the [facility] at [phone] Monday-Thursday from 8:00 am - 4:30 pm, or Friday from 8:00 am - 2:30 pm.  If you need help with your wound outside these hours and cannot wait until we are again available, contact your home-care company (if applicable), your PCP, or go to the nearest emergency room.

## 2021-07-09 LAB
GRAM STAIN RESULT: ABNORMAL
ORGANISM: ABNORMAL
ORGANISM: ABNORMAL
WOUND/ABSCESS: ABNORMAL
WOUND/ABSCESS: ABNORMAL

## 2021-07-09 RX ORDER — DOXYCYCLINE HYCLATE 100 MG
100 TABLET ORAL 2 TIMES DAILY
Qty: 14 TABLET | Refills: 0 | Status: ON HOLD | OUTPATIENT
Start: 2021-07-09 | End: 2021-07-15 | Stop reason: SDUPTHER

## 2021-07-11 ENCOUNTER — HOSPITAL ENCOUNTER (INPATIENT)
Age: 71
LOS: 4 days | Discharge: HOME HEALTH | DRG: 856 | End: 2021-07-15
Attending: EMERGENCY MEDICINE | Admitting: HOSPITALIST
Payer: MEDICARE

## 2021-07-11 ENCOUNTER — APPOINTMENT (OUTPATIENT)
Dept: CT IMAGING | Age: 71
DRG: 856 | End: 2021-07-11
Payer: MEDICARE

## 2021-07-11 ENCOUNTER — APPOINTMENT (OUTPATIENT)
Dept: GENERAL RADIOLOGY | Age: 71
DRG: 856 | End: 2021-07-11
Payer: MEDICARE

## 2021-07-11 DIAGNOSIS — L03.116 LEFT LEG CELLULITIS: Primary | ICD-10-CM

## 2021-07-11 DIAGNOSIS — Z78.9 FAILURE OF OUTPATIENT TREATMENT: ICD-10-CM

## 2021-07-11 PROBLEM — L02.416 CELLULITIS AND ABSCESS OF LEFT LEG: Status: ACTIVE | Noted: 2021-07-11

## 2021-07-11 PROBLEM — Z95.1 HX OF CABG: Status: ACTIVE | Noted: 2021-07-11

## 2021-07-11 LAB
A/G RATIO: 0.6 (ref 1.1–2.2)
ALBUMIN SERPL-MCNC: 2.8 G/DL (ref 3.4–5)
ALP BLD-CCNC: 177 U/L (ref 40–129)
ALT SERPL-CCNC: 9 U/L (ref 10–40)
ANION GAP SERPL CALCULATED.3IONS-SCNC: 12 MMOL/L (ref 3–16)
AST SERPL-CCNC: 14 U/L (ref 15–37)
BASOPHILS ABSOLUTE: 0 K/UL (ref 0–0.2)
BASOPHILS RELATIVE PERCENT: 0.7 %
BILIRUB SERPL-MCNC: 0.3 MG/DL (ref 0–1)
BUN BLDV-MCNC: 14 MG/DL (ref 7–20)
CALCIUM SERPL-MCNC: 9.1 MG/DL (ref 8.3–10.6)
CHLORIDE BLD-SCNC: 93 MMOL/L (ref 99–110)
CO2: 24 MMOL/L (ref 21–32)
CREAT SERPL-MCNC: 1 MG/DL (ref 0.6–1.2)
EOSINOPHILS ABSOLUTE: 0.1 K/UL (ref 0–0.6)
EOSINOPHILS RELATIVE PERCENT: 2.3 %
GFR AFRICAN AMERICAN: >60
GFR NON-AFRICAN AMERICAN: 55
GLOBULIN: 4.5 G/DL
GLUCOSE BLD-MCNC: 255 MG/DL (ref 70–99)
GLUCOSE BLD-MCNC: 312 MG/DL (ref 70–99)
HCT VFR BLD CALC: 28.5 % (ref 36–48)
HEMOGLOBIN: 9.3 G/DL (ref 12–16)
LACTIC ACID: 1.4 MMOL/L (ref 0.4–2)
LYMPHOCYTES ABSOLUTE: 0.7 K/UL (ref 1–5.1)
LYMPHOCYTES RELATIVE PERCENT: 10.3 %
MCH RBC QN AUTO: 27.8 PG (ref 26–34)
MCHC RBC AUTO-ENTMCNC: 32.7 G/DL (ref 31–36)
MCV RBC AUTO: 84.9 FL (ref 80–100)
MONOCYTES ABSOLUTE: 0.5 K/UL (ref 0–1.3)
MONOCYTES RELATIVE PERCENT: 7.3 %
NEUTROPHILS ABSOLUTE: 5.2 K/UL (ref 1.7–7.7)
NEUTROPHILS RELATIVE PERCENT: 79.4 %
PDW BLD-RTO: 15.6 % (ref 12.4–15.4)
PERFORMED ON: ABNORMAL
PLATELET # BLD: 248 K/UL (ref 135–450)
PMV BLD AUTO: 7.4 FL (ref 5–10.5)
POTASSIUM SERPL-SCNC: 4 MMOL/L (ref 3.5–5.1)
PRO-BNP: ABNORMAL PG/ML (ref 0–124)
PROCALCITONIN: 1.46 NG/ML (ref 0–0.15)
RBC # BLD: 3.36 M/UL (ref 4–5.2)
SEDIMENTATION RATE, ERYTHROCYTE: 116 MM/HR (ref 0–30)
SODIUM BLD-SCNC: 129 MMOL/L (ref 136–145)
TOTAL PROTEIN: 7.3 G/DL (ref 6.4–8.2)
WBC # BLD: 6.5 K/UL (ref 4–11)

## 2021-07-11 PROCEDURE — 96365 THER/PROPH/DIAG IV INF INIT: CPT

## 2021-07-11 PROCEDURE — P9047 ALBUMIN (HUMAN), 25%, 50ML: HCPCS | Performed by: HOSPITALIST

## 2021-07-11 PROCEDURE — 2580000003 HC RX 258: Performed by: PHYSICIAN ASSISTANT

## 2021-07-11 PROCEDURE — 87040 BLOOD CULTURE FOR BACTERIA: CPT

## 2021-07-11 PROCEDURE — 85025 COMPLETE CBC W/AUTO DIFF WBC: CPT

## 2021-07-11 PROCEDURE — 2580000003 HC RX 258: Performed by: HOSPITALIST

## 2021-07-11 PROCEDURE — 86140 C-REACTIVE PROTEIN: CPT

## 2021-07-11 PROCEDURE — 84134 ASSAY OF PREALBUMIN: CPT

## 2021-07-11 PROCEDURE — 73590 X-RAY EXAM OF LOWER LEG: CPT

## 2021-07-11 PROCEDURE — 93005 ELECTROCARDIOGRAM TRACING: CPT | Performed by: HOSPITALIST

## 2021-07-11 PROCEDURE — 1200000000 HC SEMI PRIVATE

## 2021-07-11 PROCEDURE — 84145 PROCALCITONIN (PCT): CPT

## 2021-07-11 PROCEDURE — 84300 ASSAY OF URINE SODIUM: CPT

## 2021-07-11 PROCEDURE — 85652 RBC SED RATE AUTOMATED: CPT

## 2021-07-11 PROCEDURE — 73700 CT LOWER EXTREMITY W/O DYE: CPT

## 2021-07-11 PROCEDURE — 83880 ASSAY OF NATRIURETIC PEPTIDE: CPT

## 2021-07-11 PROCEDURE — 6360000002 HC RX W HCPCS: Performed by: HOSPITALIST

## 2021-07-11 PROCEDURE — 99223 1ST HOSP IP/OBS HIGH 75: CPT | Performed by: INTERNAL MEDICINE

## 2021-07-11 PROCEDURE — 83930 ASSAY OF BLOOD OSMOLALITY: CPT

## 2021-07-11 PROCEDURE — 99283 EMERGENCY DEPT VISIT LOW MDM: CPT

## 2021-07-11 PROCEDURE — 83935 ASSAY OF URINE OSMOLALITY: CPT

## 2021-07-11 PROCEDURE — 6360000002 HC RX W HCPCS: Performed by: PHYSICIAN ASSISTANT

## 2021-07-11 PROCEDURE — 82570 ASSAY OF URINE CREATININE: CPT

## 2021-07-11 PROCEDURE — 71045 X-RAY EXAM CHEST 1 VIEW: CPT

## 2021-07-11 PROCEDURE — 83605 ASSAY OF LACTIC ACID: CPT

## 2021-07-11 PROCEDURE — 80053 COMPREHEN METABOLIC PANEL: CPT

## 2021-07-11 PROCEDURE — 6370000000 HC RX 637 (ALT 250 FOR IP): Performed by: HOSPITALIST

## 2021-07-11 RX ORDER — ALBUTEROL SULFATE 2.5 MG/3ML
2.5 SOLUTION RESPIRATORY (INHALATION) EVERY 6 HOURS PRN
Status: DISCONTINUED | OUTPATIENT
Start: 2021-07-11 | End: 2021-07-15 | Stop reason: HOSPADM

## 2021-07-11 RX ORDER — OXYCODONE HYDROCHLORIDE 5 MG/1
10 TABLET ORAL EVERY 6 HOURS PRN
Status: DISCONTINUED | OUTPATIENT
Start: 2021-07-11 | End: 2021-07-15 | Stop reason: HOSPADM

## 2021-07-11 RX ORDER — SODIUM CHLORIDE 0.9 % (FLUSH) 0.9 %
10 SYRINGE (ML) INJECTION EVERY 12 HOURS SCHEDULED
Status: DISCONTINUED | OUTPATIENT
Start: 2021-07-11 | End: 2021-07-14 | Stop reason: SDUPTHER

## 2021-07-11 RX ORDER — VITAMIN B COMPLEX
5000 TABLET ORAL DAILY
Status: DISCONTINUED | OUTPATIENT
Start: 2021-07-12 | End: 2021-07-15 | Stop reason: HOSPADM

## 2021-07-11 RX ORDER — ACETAMINOPHEN 650 MG/1
650 SUPPOSITORY RECTAL EVERY 6 HOURS PRN
Status: DISCONTINUED | OUTPATIENT
Start: 2021-07-11 | End: 2021-07-15 | Stop reason: HOSPADM

## 2021-07-11 RX ORDER — NICOTINE POLACRILEX 4 MG
15 LOZENGE BUCCAL PRN
Status: DISCONTINUED | OUTPATIENT
Start: 2021-07-11 | End: 2021-07-15 | Stop reason: HOSPADM

## 2021-07-11 RX ORDER — DOCUSATE SODIUM 100 MG/1
100 CAPSULE, LIQUID FILLED ORAL 2 TIMES DAILY PRN
Status: DISCONTINUED | OUTPATIENT
Start: 2021-07-11 | End: 2021-07-15 | Stop reason: HOSPADM

## 2021-07-11 RX ORDER — ACETAMINOPHEN 325 MG/1
650 TABLET ORAL EVERY 6 HOURS PRN
Status: DISCONTINUED | OUTPATIENT
Start: 2021-07-11 | End: 2021-07-15 | Stop reason: HOSPADM

## 2021-07-11 RX ORDER — SODIUM CHLORIDE 9 MG/ML
25 INJECTION, SOLUTION INTRAVENOUS PRN
Status: DISCONTINUED | OUTPATIENT
Start: 2021-07-11 | End: 2021-07-14 | Stop reason: SDUPTHER

## 2021-07-11 RX ORDER — OXYCODONE HYDROCHLORIDE 5 MG/1
5 TABLET ORAL EVERY 6 HOURS PRN
Status: DISCONTINUED | OUTPATIENT
Start: 2021-07-11 | End: 2021-07-15 | Stop reason: HOSPADM

## 2021-07-11 RX ORDER — ONDANSETRON 2 MG/ML
4 INJECTION INTRAMUSCULAR; INTRAVENOUS EVERY 6 HOURS PRN
Status: DISCONTINUED | OUTPATIENT
Start: 2021-07-11 | End: 2021-07-15 | Stop reason: HOSPADM

## 2021-07-11 RX ORDER — IPRATROPIUM BROMIDE 42 UG/1
2 SPRAY, METERED NASAL 4 TIMES DAILY
Status: DISCONTINUED | OUTPATIENT
Start: 2021-07-11 | End: 2021-07-15 | Stop reason: HOSPADM

## 2021-07-11 RX ORDER — SODIUM CHLORIDE 9 MG/ML
INJECTION, SOLUTION INTRAVENOUS CONTINUOUS
Status: DISCONTINUED | OUTPATIENT
Start: 2021-07-11 | End: 2021-07-11

## 2021-07-11 RX ORDER — SODIUM CHLORIDE 0.9 % (FLUSH) 0.9 %
10 SYRINGE (ML) INJECTION PRN
Status: DISCONTINUED | OUTPATIENT
Start: 2021-07-11 | End: 2021-07-14 | Stop reason: SDUPTHER

## 2021-07-11 RX ORDER — GABAPENTIN 300 MG/1
300 CAPSULE ORAL 2 TIMES DAILY
Status: DISCONTINUED | OUTPATIENT
Start: 2021-07-11 | End: 2021-07-15 | Stop reason: HOSPADM

## 2021-07-11 RX ORDER — SENNA PLUS 8.6 MG/1
1 TABLET ORAL DAILY PRN
Status: DISCONTINUED | OUTPATIENT
Start: 2021-07-11 | End: 2021-07-15 | Stop reason: HOSPADM

## 2021-07-11 RX ORDER — CLOPIDOGREL BISULFATE 75 MG/1
75 TABLET ORAL DAILY
Status: DISCONTINUED | OUTPATIENT
Start: 2021-07-11 | End: 2021-07-15 | Stop reason: HOSPADM

## 2021-07-11 RX ORDER — CYCLOBENZAPRINE HCL 10 MG
10 TABLET ORAL 2 TIMES DAILY PRN
Status: DISCONTINUED | OUTPATIENT
Start: 2021-07-11 | End: 2021-07-15 | Stop reason: HOSPADM

## 2021-07-11 RX ORDER — INSULIN GLARGINE 100 [IU]/ML
0.25 INJECTION, SOLUTION SUBCUTANEOUS NIGHTLY
Status: DISCONTINUED | OUTPATIENT
Start: 2021-07-11 | End: 2021-07-15 | Stop reason: HOSPADM

## 2021-07-11 RX ORDER — CALCIUM CARBONATE 500(1250)
500 TABLET ORAL DAILY
Status: DISCONTINUED | OUTPATIENT
Start: 2021-07-11 | End: 2021-07-15 | Stop reason: HOSPADM

## 2021-07-11 RX ORDER — CETIRIZINE HYDROCHLORIDE 10 MG/1
10 TABLET ORAL DAILY
Status: DISCONTINUED | OUTPATIENT
Start: 2021-07-11 | End: 2021-07-15 | Stop reason: HOSPADM

## 2021-07-11 RX ORDER — MAGNESIUM SULFATE IN WATER 40 MG/ML
2000 INJECTION, SOLUTION INTRAVENOUS PRN
Status: DISCONTINUED | OUTPATIENT
Start: 2021-07-11 | End: 2021-07-15 | Stop reason: HOSPADM

## 2021-07-11 RX ORDER — PANTOPRAZOLE SODIUM 40 MG/1
40 TABLET, DELAYED RELEASE ORAL
Status: DISCONTINUED | OUTPATIENT
Start: 2021-07-12 | End: 2021-07-15 | Stop reason: HOSPADM

## 2021-07-11 RX ORDER — ATORVASTATIN CALCIUM 40 MG/1
40 TABLET, FILM COATED ORAL NIGHTLY
Status: DISCONTINUED | OUTPATIENT
Start: 2021-07-12 | End: 2021-07-15 | Stop reason: HOSPADM

## 2021-07-11 RX ORDER — FLUTICASONE PROPIONATE 50 MCG
1 SPRAY, SUSPENSION (ML) NASAL DAILY
Status: DISCONTINUED | OUTPATIENT
Start: 2021-07-11 | End: 2021-07-15 | Stop reason: HOSPADM

## 2021-07-11 RX ORDER — PROMETHAZINE HYDROCHLORIDE 25 MG/1
12.5 TABLET ORAL EVERY 6 HOURS PRN
Status: DISCONTINUED | OUTPATIENT
Start: 2021-07-11 | End: 2021-07-15 | Stop reason: HOSPADM

## 2021-07-11 RX ORDER — POTASSIUM CHLORIDE 7.45 MG/ML
10 INJECTION INTRAVENOUS PRN
Status: DISCONTINUED | OUTPATIENT
Start: 2021-07-11 | End: 2021-07-15 | Stop reason: HOSPADM

## 2021-07-11 RX ORDER — POTASSIUM CHLORIDE 20 MEQ/1
40 TABLET, EXTENDED RELEASE ORAL PRN
Status: DISCONTINUED | OUTPATIENT
Start: 2021-07-11 | End: 2021-07-15 | Stop reason: HOSPADM

## 2021-07-11 RX ORDER — ALBUMIN (HUMAN) 12.5 G/50ML
25 SOLUTION INTRAVENOUS EVERY 8 HOURS
Status: COMPLETED | OUTPATIENT
Start: 2021-07-11 | End: 2021-07-12

## 2021-07-11 RX ORDER — ASPIRIN 81 MG/1
81 TABLET ORAL DAILY
Status: DISCONTINUED | OUTPATIENT
Start: 2021-07-11 | End: 2021-07-15 | Stop reason: HOSPADM

## 2021-07-11 RX ORDER — LATANOPROST 50 UG/ML
1 SOLUTION/ DROPS OPHTHALMIC NIGHTLY
Status: DISCONTINUED | OUTPATIENT
Start: 2021-07-11 | End: 2021-07-15 | Stop reason: HOSPADM

## 2021-07-11 RX ORDER — MORPHINE SULFATE 15 MG/1
15 TABLET, FILM COATED, EXTENDED RELEASE ORAL 2 TIMES DAILY
Status: DISCONTINUED | OUTPATIENT
Start: 2021-07-11 | End: 2021-07-15 | Stop reason: HOSPADM

## 2021-07-11 RX ORDER — TERIPARATIDE 250 UG/ML
20 INJECTION, SOLUTION SUBCUTANEOUS DAILY
Status: DISCONTINUED | OUTPATIENT
Start: 2021-07-12 | End: 2021-07-15 | Stop reason: HOSPADM

## 2021-07-11 RX ORDER — DEXTROSE MONOHYDRATE 50 MG/ML
100 INJECTION, SOLUTION INTRAVENOUS PRN
Status: DISCONTINUED | OUTPATIENT
Start: 2021-07-11 | End: 2021-07-15 | Stop reason: HOSPADM

## 2021-07-11 RX ORDER — DULOXETIN HYDROCHLORIDE 60 MG/1
60 CAPSULE, DELAYED RELEASE ORAL DAILY
Status: DISCONTINUED | OUTPATIENT
Start: 2021-07-11 | End: 2021-07-15 | Stop reason: HOSPADM

## 2021-07-11 RX ORDER — PREDNISONE 1 MG/1
4 TABLET ORAL DAILY
Status: DISCONTINUED | OUTPATIENT
Start: 2021-07-11 | End: 2021-07-15 | Stop reason: HOSPADM

## 2021-07-11 RX ORDER — DEXTROSE MONOHYDRATE 25 G/50ML
12.5 INJECTION, SOLUTION INTRAVENOUS PRN
Status: DISCONTINUED | OUTPATIENT
Start: 2021-07-11 | End: 2021-07-15 | Stop reason: HOSPADM

## 2021-07-11 RX ADMIN — ALBUMIN (HUMAN) 25 G: 0.25 INJECTION, SOLUTION INTRAVENOUS at 19:59

## 2021-07-11 RX ADMIN — GABAPENTIN 300 MG: 300 CAPSULE ORAL at 20:37

## 2021-07-11 RX ADMIN — INSULIN GLARGINE 18 UNITS: 100 INJECTION, SOLUTION SUBCUTANEOUS at 21:31

## 2021-07-11 RX ADMIN — VANCOMYCIN HYDROCHLORIDE 1000 MG: 1 INJECTION, POWDER, LYOPHILIZED, FOR SOLUTION INTRAVENOUS at 16:32

## 2021-07-11 RX ADMIN — MORPHINE SULFATE 15 MG: 15 TABLET, FILM COATED, EXTENDED RELEASE ORAL at 20:37

## 2021-07-11 RX ADMIN — CYCLOBENZAPRINE 10 MG: 10 TABLET, FILM COATED ORAL at 20:37

## 2021-07-11 RX ADMIN — FUROSEMIDE 5 MG/HR: 10 INJECTION, SOLUTION INTRAMUSCULAR; INTRAVENOUS at 22:25

## 2021-07-11 RX ADMIN — INSULIN LISPRO 4 UNITS: 100 INJECTION, SOLUTION INTRAVENOUS; SUBCUTANEOUS at 21:31

## 2021-07-11 RX ADMIN — CEFEPIME 2000 MG: 2 INJECTION, POWDER, FOR SOLUTION INTRAMUSCULAR; INTRAVENOUS at 21:30

## 2021-07-11 RX ADMIN — SODIUM CHLORIDE 25 ML: 9 INJECTION, SOLUTION INTRAVENOUS at 21:30

## 2021-07-11 RX ADMIN — IPRATROPIUM BROMIDE 2 SPRAY: 42 SPRAY NASAL at 21:15

## 2021-07-11 RX ADMIN — SODIUM CHLORIDE, PRESERVATIVE FREE 10 ML: 5 INJECTION INTRAVENOUS at 20:14

## 2021-07-11 RX ADMIN — ENOXAPARIN SODIUM 40 MG: 40 INJECTION SUBCUTANEOUS at 21:31

## 2021-07-11 RX ADMIN — PIPERACILLIN AND TAZOBACTAM 3375 MG: 3; .375 INJECTION, POWDER, LYOPHILIZED, FOR SOLUTION INTRAVENOUS at 15:53

## 2021-07-11 RX ADMIN — SODIUM CHLORIDE 25 ML: 9 INJECTION, SOLUTION INTRAVENOUS at 20:26

## 2021-07-11 RX ADMIN — LATANOPROST 1 DROP: 50 SOLUTION OPHTHALMIC at 21:15

## 2021-07-11 ASSESSMENT — PAIN - FUNCTIONAL ASSESSMENT: PAIN_FUNCTIONAL_ASSESSMENT: PREVENTS OR INTERFERES SOME ACTIVE ACTIVITIES AND ADLS

## 2021-07-11 ASSESSMENT — PAIN DESCRIPTION - FREQUENCY: FREQUENCY: CONTINUOUS

## 2021-07-11 ASSESSMENT — PAIN DESCRIPTION - PAIN TYPE
TYPE: ACUTE PAIN
TYPE: ACUTE PAIN

## 2021-07-11 ASSESSMENT — PAIN DESCRIPTION - DESCRIPTORS: DESCRIPTORS: ACHING;CONSTANT;SHARP

## 2021-07-11 ASSESSMENT — PAIN DESCRIPTION - LOCATION
LOCATION: LEG
LOCATION: LEG

## 2021-07-11 ASSESSMENT — PAIN DESCRIPTION - ORIENTATION
ORIENTATION: LEFT
ORIENTATION: LEFT

## 2021-07-11 ASSESSMENT — PAIN SCALES - GENERAL
PAINLEVEL_OUTOF10: 8
PAINLEVEL_OUTOF10: 8
PAINLEVEL_OUTOF10: 6

## 2021-07-11 ASSESSMENT — PAIN DESCRIPTION - ONSET: ONSET: ON-GOING

## 2021-07-11 ASSESSMENT — PAIN DESCRIPTION - PROGRESSION: CLINICAL_PROGRESSION: NOT CHANGED

## 2021-07-11 NOTE — ED NOTES
PS Infectious Disease @ 2117  Re: + MRSA and Pseudomonas from wound at site of vein harvest LLE; also stage IV sacral decubitus  Do not need call back     Miah Muro  07/11/21 9527

## 2021-07-11 NOTE — ED PROVIDER NOTES
[address] [facility] Emergency Department    CHIEF COMPLAINT  Wound Infection (pt was seen by wound care and placed on antibiotics. wound on left leg getting worse)      SHARED SERVICE VISIT  I have seen and evaluated this patient with my supervising physician, Dr. Juan Avery. HISTORY OF PRESENT ILLNESS  Steven Craig is a 72 y.o. female who presents to the ED complaining of worsening left leg pain and swelling. Patient brought in by family for evaluation. Patient states that she was seen by wound care doctor today for left leg wound. He was concerned at that point for infection and patient reports that she started the Levaquin and Bactrim. States that she was called on Friday and switched from Bactrim to doxycycline however still taking the Levaquin. She reports that despite antibiotics the leg has gotten more red, swollen, and painful. Tender to touch. Denies nausea, vomiting or diarrhea. No fevers chills. History of diabetes. Denies chest pain shortness of breath. No recent travel, trauma or surgery. No other complaints, modifying factors or associated symptoms. Nursing notes reviewed.    Past Medical History:   Diagnosis Date    Atherosclerosis of native artery of right lower extremity with rest pain ([address] 07/25/2017    Back pain     Branch retinal vein occlusion 07/20/2012    Bronchiectasis with acute exacerbation (HCC)     Closed compression fracture of thoracic vertebra ([address] 01/15/2020    Closed fracture of facial bone with routine healing 11/21/2016    Closed jaw fracture ([address] 01/15/2020    Community acquired pneumonia of left lower lobe of lung     Compression fracture of L1 lumbar vertebra ([address] 01/15/2020    COPD (chronic obstructive pulmonary disease) (HCC)     Fracture of tibial plateau, closed, left, initial encounter 12/05/2017    Minimally displaced zone I fracture of sacrum (HCC) 09/02/2020    Mucus plugging of bronchi     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 Bone density test last year showed severe osteoporosis. Recently, two broken vertebrae (L1, L2) due to coughing. Diagnosed with tracheomalacia and stated she must cough very hard to clear phlegm. Was coughing due to upper respiratory infections which have been treated. Hx of laminectomy and recent kyphoplasty.    Refractured her jawbone which was previously repaired wi    Post herpetic neuralgia     Proximal humerus fracture 10/01/2019    Rheumatoid arthritis ([address]     Shingles 05/2020    Sleep apnea     Temporal arteritis ([address] 07/10/2013    Tobacco use 10/19/2017    Tracheomalacia     Vitreous hemorrhage, right eye ([address] 02/21/2020     Past Surgical History:   Procedure Laterality Date    ARTERY BIOPSY Right 03/01/2021    at [address]  05/30/2013    left temporal artery biopsy    BACK SURGERY  08/2020    BRONCHOSCOPY      BRONCHOSCOPY N/A 06/12/2019    BRONCHOSCOPY ALVEOLAR LAVAGE performed by Chris Thomas MD at [address]  05/03/2021    CATARACT REMOVAL      CORONARY ARTERY BYPASS GRAFT N/A 05/03/2021    CORONARY ARTERY BYPASS X3 WITH LEFT ATRIAL APPENDAGE CLIP, 5 LEVEL BILATERAL INTERCOSTAL NERVE BLOCK, STERNAL PLATING performed by Chris Mcfarlane MD at [address] ARTHROSCOPY      left    KYPHOSIS SURGERY      LAMINECTOMY      MANDIBLE FRACTURE SURGERY      MANDIBLE FRACTURE SURGERY  02/2020    MEDIASTINOSCOPY N/A 05/05/2021    MEDIASTINAL EXPLORATION AND EVACUATION OF HEMATOMA performed by Chris Mcfarlane MD at [address]  01/08/2021    sacral wound debridement    PRESSURE ULCER DEBRIDEMENT N/A 01/08/2021    SACRAL WOUND DEBRIDEMENT performed by Edita Skelton MD at [address] SEPTOPLASTY  2013    FESS with balloon    SPINAL FUSION      TUBAL LIGATION      UPPER GASTROINTESTINAL ENDOSCOPY  2014    Dilitation     Family History   Problem Relation Age of Onset    Diabetes Mother     Hypertension Mother     Asthma Other     Heart Disease Brother     Diabetes Brother     Diabetes Sister     Heart Disease Brother     Cancer Neg Hx     Emphysema Neg Hx     Heart Failure Neg Hx      Social History     Socioeconomic History    Marital status:      Spouse name: Not on file    Number of children: Not on file    Years of education: Not on file    Highest education level: Not on file   Occupational History    Not on file   Tobacco Use    Smoking status: Former Smoker     Packs/day: 1.00     Years: 20.00     Pack years: 20.00     Types: Cigarettes     Quit date: 1991     Years since quittin.2    Smokeless tobacco: Never Used   Vaping Use    Vaping Use: Never used   Substance and Sexual Activity    Alcohol use: Not Currently     Alcohol/week: 0.0 standard drinks     Comment: rarely    Drug use: No    Sexual activity: Not on file   Other Topics Concern    Not on file   Social History Narrative    Not on file     Social Determinants of Health     Financial Resource Strain:     Difficulty of Paying Living Expenses:    Food Insecurity:     Worried About Running Out of Food in the Last Year:     [address] in the Last Year:    Transportation Needs:     Lack of Transportation (Medical):      Lack of Transportation (Non-Medical):    Physical Activity:     Days of Exercise per Week:     Minutes of Exercise per Session:    Stress:     Feeling of Stress :    Social Connections:     Frequency of Communication with Friends and Family:     Frequency of Social Gatherings with Friends and Family:     Attends Rastafarian Services:     Active Member of Clubs or Organizations:     Attends Club or Organization Meetings:     Marital Status:    Intimate Partner Violence:     Fear of Current or Ex-Partner:     Emotionally Abused:     Physically Abused:     Sexually Abused:      Current Facility-Administered Medications   Medication Dose Route Frequency Provider Last Rate Last Admin    vancomycin 1000 mg IVPB in 250 mL D5W addavial  1,000 mg Intravenous Once Marveen Pal, [place]        piperacillin-tazobactam (ZOSYN) 3,375 mg in dextrose 5 % 50 mL IVPB (mini-bag)  3,375 mg Intravenous Once Marveen Pal, [place]         Current Outpatient Medications   Medication Sig Dispense Refill    doxycycline hyclate (VIBRA-TABS) 100 MG tablet Take 1 tablet by mouth 2 times daily for 7 days -- continue your levofloxacin, add this antibiotic, but STOP the Bactrim (trimethoprpm-sulfamethoxazole). 14 tablet 0    levoFLOXacin (LEVAQUIN) 500 MG tablet Take 1 tablet by mouth daily for 7 days 7 tablet 0    azithromycin (ZITHROMAX) 250 MG tablet TAKE 1 TABLET BY MOUTH EVERY DAY 90 tablet 3    DALIRESP 500 MCG tablet TAKE 1 TABLET BY MOUTH DAILY 30 tablet 11    clopidogrel (PLAVIX) 75 MG tablet Take 1 tablet by mouth daily 30 tablet 11    metoprolol tartrate (LOPRESSOR) 25 MG tablet Take 0.5 tablets by mouth 2 times daily Hold this medication for pulse <33 or systolic BP <955 30 tablet 11    metroNIDAZOLE (FLAGYL) 250 MG tablet Crush one tablet into a fine powder, sprinkle into wound TIW as needed for malodor, at time of dressing change. 30 tablet 1    predniSONE (DELTASONE) 1 MG tablet Take 4 mg by mouth daily      insulin glargine (LANTUS) 100 UNIT/ML injection vial Inject 15 Units into the skin nightly 1 vial 0    oxyCODONE-acetaminophen (PERCOCET)  MG per tablet Take 1 tablet by mouth daily.        docusate sodium (COLACE) 100 MG capsule Take 100 mg by mouth 2 times daily as needed for Constipation      DULoxetine (CYMBALTA) 60 MG extended release capsule Take 60 mg by mouth daily      gabapentin (NEURONTIN) 300 MG capsule Take 300 mg by mouth 2 times daily.  latanoprost (XALATAN) 0.005 % ophthalmic solution Place 1 drop into both eyes nightly      Teriparatide, Recombinant, (FORTEO) 600 MCG/2.4ML SOPN injection Inject 20 mcg into the skin daily      NARCAN 4 MG/0.1ML LIQD nasal spray PLEASE SEE ATTACHED FOR DETAILED DIRECTIONS      morphine (MS CONTIN) 15 MG extended release tablet 15 mg 2 times daily.  ipratropium (ATROVENT) 0.06 % nasal spray USE 2 SPRAYS BY NASAL ROUTE 2-4 TIMES DAILY 1 Bottle 5    albuterol sulfate  (90 Base) MCG/ACT inhaler INHALE 2 PUFFS INTO THE LUNGS EVERY 4 HOURS AS NEEDED FOR WHEEZING 1 Inhaler 5    vitamin D (CHOLECALCIFEROL) 1000 UNIT TABS tablet Take 5,000 Units by mouth daily       calcium carbonate (OSCAL) 500 MG TABS tablet Take 500 mg by mouth daily      atorvastatin (LIPITOR) 40 MG tablet Take 40 mg by mouth      cyclobenzaprine (FLEXERIL) 10 MG tablet Take 10 mg by mouth 2 times daily as needed       Insulin Syringe-Needle U-100 31G X 5/16\" 0.5 ML MISC USE 5 TIMES DAILY      meloxicam (MOBIC) 15 MG tablet Patient states taking only as needed      ACCU-CHEK CEDRICK PLUS strip TEST 4 TIMES DAILY  3    aspirin EC 81 MG EC tablet Take 1 tablet by mouth daily      insulin lispro (HUMALOG) 100 UNIT/ML injection vial Inject 0-12 Units into the skin 3 times daily (with meals)      Misc. Devices (ACAPELLA) MISC Take 1 Device by mouth as needed 1 each 0    fluticasone (FLONASE) 50 MCG/ACT nasal spray INHALE 2 SPRAYS IN EACH NOSTRIL DAILY 1 Bottle 5    cetirizine (ZYRTEC) 10 MG tablet Take 10 mg by mouth daily.  etanercept (ENBREL) 50 MG/ML injection Inject 50 mg into the skin every 7 days.       omeprazole (PRILOSEC) 40 MG capsule Take 40 mg by mouth daily        Allergies   Allergen Reactions    Atenolol      Cough         REVIEW OF SYSTEMS  10 systems reviewed, pertinent positives per HPI otherwise noted to be negative    PHYSICAL EXAM  /65   Pulse 86   Temp 98.5 °F (36.9 °C) (Oral)   Resp 16   Ht 5' 8\" (1.727 m)   Wt 155 lb (70.3 kg)   SpO2 95%   BMI 23.57 kg/m²   GENERAL APPEARANCE: Awake and alert. Cooperative. No acute distress. HEAD: Normocephalic. Atraumatic. EYES: PERRL. EOM's grossly intact. ENT: Mucous membranes are moist.   NECK: Supple. HEART: RRR. No murmurs. LUNGS: Respirations unlabored. CTAB. Good air exchange. Speaking comfortably in full sentences. ABDOMEN: Soft. Non-distended. Non-tender. No guarding or rebound. No masses. No organomegaly. EXTREMITIES: +2 pitting peripheral edema noted to left lower leg with associated redness and warmth. No appreciable streaking. Wound noted to medial mid calf. No active bleeding. No areas of fluctuance or induration. No streaking. Compartment soft without crepitus. No vesicles, blistering or sloughing. . Moves all extremities equally. All extremities neurovascularly intact. SKIN: Warm and dry. No acute rashes. NEUROLOGICAL: Alert and oriented. CN's 2-12 intact. No gross facial drooping. Strength 5/5, sensation intact. PSYCHIATRIC: Normal mood and affect. RADIOLOGY  XR SACRUM COCCYX (MIN 2 VIEWS)    Result Date: 6/16/2021  EXAMINATION: THREE XRAY VIEWS OF THE SACRUM/COCCYX 6/16/2021 11:54 am COMPARISON: None. HISTORY: ORDERING SYSTEM PROVIDED HISTORY: Pressure ulcer of coccygeal region, stage 4 [facility]) TECHNOLOGIST PROVIDED HISTORY: Reason for Exam: Pressure ulcer of coccygeal Acuity: Acute Type of Exam: Initial FINDINGS: The sacrum is intact with no appreciated fracture. Normal contour of sacral foramina. SI joints are symmetric and normal.  No additional pelvic abnormality. No coccygeal fracture. There are multiple levels of kyphoplasty in the visualized lumbar spine. L5 compression fracture is remote and untreated. No detrimental change. No acute finding of the sacrum or coccyx. Multiple compression fractures in the lumbar spine with several levels of kyphoplasty that appear stable.      XR TIBIA FIBULA LEFT (2 VIEWS)    Result Date: 7/11/2021  EXAMINATION: 2 XRAY VIEWS OF THE LEFT TIBIA AND FIBULA 7/11/2021 3:16 pm COMPARISON: None. HISTORY: ORDERING SYSTEM PROVIDED HISTORY: wound/infection TECHNOLOGIST PROVIDED HISTORY: Reason for exam:->wound/infection Reason for Exam: wound, infection Acuity: Unknown Type of Exam: Initial FINDINGS: Diffuse soft tissue swelling. Surgical clips in the posteromedial soft tissues of the level of the proximal tibia. Overlying shallow soft tissue defect. No soft tissue gas. Atherosclerotic vascular calcifications. No fracture or dislocation. No osseous erosion or periostitis. Mild tricompartmental degenerative changes of the knee. No joint effusion. 1. Shallow soft tissue defect over the lateral aspect of the proximal leg with diffuse soft tissue swelling. No soft tissue gas. 2. No radiographic evidence of osteomyelitis or other acute osseous abnormality. XR CHEST PORTABLE    Result Date: 7/11/2021  EXAMINATION: ONE XRAY VIEW OF THE CHEST 7/11/2021 5:05 pm COMPARISON: 05/06/2021 HISTORY: ORDERING SYSTEM PROVIDED HISTORY: + COPD; r/o PNA TECHNOLOGIST PROVIDED HISTORY: Reason for exam:->+ COPD; r/o PNA Reason for Exam: r/o PNA Acuity: Acute Type of Exam: Initial FINDINGS: The heart size is enlarged. The patient is status post sternotomy. An atrial appendage closure device is present. There is no pneumothorax. Interstitial and airspace opacity is noted at the lung bases. The bony thorax is grossly intact. A small left-sided pleural effusion is suspected. Suspected interstitial pulmonary edema. Superimposed pneumonia at the lung bases cannot be excluded. Mild cardiomegaly.      CT TIBIA FIBULA LEFT WO CONTRAST    Result Date: 7/12/2021  EXAMINATION: CT OF THE LEFT TIBIA AND FIBULA WITHOUT CONTRAST 7/11/2021 11:11 pm TECHNIQUE: CT of the left tibia and fibula was performed without the administration of intravenous contrast.  Multiplanar reformatted images are provided for review. Dose modulation, iterative reconstruction, and/or weight based adjustment of the mA/kV was utilized to reduce the radiation dose to as low as reasonably achievable. COMPARISON: None HISTORY ORDERING SYSTEM PROVIDED HISTORY: Left leg open wound s/p vein harvest with abscess formation and cellulitis TECHNOLOGIST PROVIDED HISTORY: Reason for exam:->Left leg open wound s/p vein harvest with abscess formation and cellulitis Reason for Exam: Left leg open wound s/p vein harvest with abscess formation and cellulitis Acuity: Acute Type of Exam: Initial FINDINGS: Bones: The tibia and fibula are osteopenic. No acute fracture. No acute periostitis or bony destruction. Soft Tissue: In the medial soft tissues of proximal right leg, there is a superficial wound. Subjacent to the wound, there is a fluid collection measuring 13 x 2.7 x 3.4 cm. Adjacent surgical clips are noted in the soft tissues. Circumferential superficial soft tissue edema is present throughout the leg. Mild fatty atrophy in the leg. No deep fascial fluid. No intramuscular fluid collection. Joint: Moderate medial compartment osteoarthritis in the knee. No abnormality at the ankle. 1. 2.7 x 3.4 x 13 cm low-attenuation fluid collection in the medial proximal leg superficial soft tissues subjacent to the wound. MRI SACRUM COCCYX WO CONTRAST    Result Date: 6/23/2021  EXAMINATION: MRI OF THE SACRUM/SI JOINT WITHOUT CONTRAST, 6/23/2021 10:53 am TECHNIQUE: Multiplanar multisequence MRI of the sacrum/si joint was performed without the administration of intravenous contrast. COMPARISON: Sacrum and coccyx radiograph June 16, 2021; CT pelvis November 26, 2012 HISTORY: ORDERING SYSTEM PROVIDED HISTORY: Pressure ulcer of coccygeal region, stage 4 [facility]) TECHNOLOGIST PROVIDED HISTORY: Reason for Exam: Non healing ulcer sacrum/coccyx area for 1 1/2 years.   Eval for osteomyelitis Acuity: Chronic Type of Exam: Ongoing FINDINGS: SOFT for follow-up. Risk management discussed and shared decision making had with patient and/or surrogate. All questions were answered. Patient in agreement.     MDM  Results for orders placed or performed during the hospital encounter of 07/11/21   CBC Auto Differential   Result Value Ref Range    WBC 6.5 4.0 - 11.0 K/uL    RBC 3.36 (L) 4.00 - 5.20 M/uL    Hemoglobin 9.3 (L) 12.0 - 16.0 g/dL    Hematocrit 28.5 (L) 36.0 - 48.0 %    MCV 84.9 80.0 - 100.0 fL    MCH 27.8 26.0 - 34.0 pg    MCHC 32.7 31.0 - 36.0 g/dL    RDW 15.6 (H) 12.4 - 15.4 %    Platelets 974 963 - 676 K/uL    MPV 7.4 5.0 - 10.5 fL    Neutrophils % 79.4 %    Lymphocytes % 10.3 %    Monocytes % 7.3 %    Eosinophils % 2.3 %    Basophils % 0.7 %    Neutrophils Absolute 5.2 1.7 - 7.7 K/uL    Lymphocytes Absolute 0.7 (L) 1.0 - 5.1 K/uL    Monocytes Absolute 0.5 0.0 - 1.3 K/uL    Eosinophils Absolute 0.1 0.0 - 0.6 K/uL    Basophils Absolute 0.0 0.0 - 0.2 K/uL   Comprehensive Metabolic Panel   Result Value Ref Range    Sodium 129 (L) 136 - 145 mmol/L    Potassium 4.0 3.5 - 5.1 mmol/L    Chloride 93 (L) 99 - 110 mmol/L    CO2 24 21 - 32 mmol/L    Anion Gap 12 3 - 16    Glucose 255 (H) 70 - 99 mg/dL    BUN 14 7 - 20 mg/dL    CREATININE 1.0 0.6 - 1.2 mg/dL    GFR Non-African American 55 (A) >60    GFR African American >60 >60    Calcium 9.1 8.3 - 10.6 mg/dL    Total Protein 7.3 6.4 - 8.2 g/dL    Albumin 2.8 (L) 3.4 - 5.0 g/dL    Albumin/Globulin Ratio 0.6 (L) 1.1 - 2.2    Total Bilirubin 0.3 0.0 - 1.0 mg/dL    Alkaline Phosphatase 177 (H) 40 - 129 U/L    ALT 9 (L) 10 - 40 U/L    AST 14 (L) 15 - 37 U/L    Globulin 4.5 g/dL   Lactic Acid, Plasma   Result Value Ref Range    Lactic Acid 1.4 0.4 - 2.0 mmol/L   Sedimentation Rate   Result Value Ref Range    Sed Rate 116 (H) 0 - 30 mm/Hr   C-Reactive Protein   Result Value Ref Range    .9 (H) 0.0 - 5.1 mg/L   Osmolality, urine   Result Value Ref Range    Osmolality, Ur 421 390 - 1070 mOsm/kg   Sodium, urine, random   Result Value Ref Range    Sodium, Ur <20 Not Established mmol/L   Osmolality   Result Value Ref Range    Osmolality 286 280 - 301 mOsm/kg   Brain Natriuretic Peptide   Result Value Ref Range    Pro-BNP 15,026 (H) 0 - 124 pg/mL   Procalcitonin   Result Value Ref Range    Procalcitonin 1.46 (H) 0.00 - 0.15 ng/mL   Comprehensive Metabolic Panel w/ Reflex to MG   Result Value Ref Range    Sodium 132 (L) 136 - 145 mmol/L    Potassium reflex Magnesium 3.3 (L) 3.5 - 5.1 mmol/L    Chloride 98 (L) 99 - 110 mmol/L    CO2 24 21 - 32 mmol/L    Anion Gap 10 3 - 16    Glucose 66 (L) 70 - 99 mg/dL    BUN 10 7 - 20 mg/dL    CREATININE 0.8 0.6 - 1.2 mg/dL    GFR Non-African American >60 >60    GFR African American >60 >60    Calcium 8.5 8.3 - 10.6 mg/dL    Total Protein 6.5 6.4 - 8.2 g/dL    Albumin 3.0 (L) 3.4 - 5.0 g/dL    Albumin/Globulin Ratio 0.9 (L) 1.1 - 2.2    Total Bilirubin 0.3 0.0 - 1.0 mg/dL    Alkaline Phosphatase 123 40 - 129 U/L    ALT 6 (L) 10 - 40 U/L    AST 11 (L) 15 - 37 U/L    Globulin 3.5 g/dL   CBC auto differential   Result Value Ref Range    WBC 4.9 4.0 - 11.0 K/uL    RBC 2.93 (L) 4.00 - 5.20 M/uL    Hemoglobin 8.2 (L) 12.0 - 16.0 g/dL    Hematocrit 24.6 (L) 36.0 - 48.0 %    MCV 83.9 80.0 - 100.0 fL    MCH 28.0 26.0 - 34.0 pg    MCHC 33.4 31.0 - 36.0 g/dL    RDW 15.0 12.4 - 15.4 %    Platelets 857 568 - 984 K/uL    MPV 7.4 5.0 - 10.5 fL    Neutrophils % 70.5 %    Lymphocytes % 12.7 %    Monocytes % 7.2 %    Eosinophils % 8.9 %    Basophils % 0.7 %    Neutrophils Absolute 3.5 1.7 - 7.7 K/uL    Lymphocytes Absolute 0.6 (L) 1.0 - 5.1 K/uL    Monocytes Absolute 0.4 0.0 - 1.3 K/uL    Eosinophils Absolute 0.4 0.0 - 0.6 K/uL    Basophils Absolute 0.0 0.0 - 0.2 K/uL   Creatinine, Random Urine   Result Value Ref Range    Creatinine, Ur 89.4 28.0 - 259.0 mg/dL   Magnesium   Result Value Ref Range    Magnesium 1.50 (L) 1.80 - 2.40 mg/dL   POCT Glucose   Result Value Ref Range    POC Glucose 312 (H) 70 - 99 mg/dl    Performed on ACCU-CHEK    POCT Glucose   Result Value Ref Range    POC Glucose 75 70 - 99 mg/dl    Performed on ACCU-CHEK    POCT Glucose   Result Value Ref Range    POC Glucose 131 (H) 70 - 99 mg/dl    Performed on ACCU-CHEK    EKG 12 Lead   Result Value Ref Range    Ventricular Rate 95 BPM    Atrial Rate 95 BPM    P-R Interval 188 ms    QRS Duration 102 ms    Q-T Interval 374 ms    QTc Calculation (Bazett) 469 ms    P Axis 76 degrees    R Axis -14 degrees    T Axis 60 degrees    Diagnosis       Sinus rhythm with Premature atrial complexes with Aberrant conductionPossible Left atrial enlargementNonspecific ST and T wave abnormalityAbnormal ECGWhen compared with ECG of 25-APR-2021 07:44,Aberrant conduction is now Present     I spoke with Adrianne Campbell and Bryce Oshea. We thoroughly discussed the history, physical exam, laboratory and imaging studies, as well as, emergency department course. Based upon that discussion, we've decided to admit Sandra Joseph to the hospital for further observation, evaluation and treatment. Final Impression  1. Left leg cellulitis    2. Failure of outpatient treatment      Blood pressure (!) 104/50, pulse 93, temperature 99.7 °F (37.6 °C), temperature source Oral, resp. rate 18, height 5' 8\" (1.727 m), weight 152 lb 12.8 oz (69.3 kg), SpO2 96 %. DISPOSITION  Patient was admitted to the hospital in stable condition.          Henny Meade, [address]  07/12/21 [id number]

## 2021-07-11 NOTE — H&P
HOSPITALISTS HISTORY AND PHYSICAL    7/11/2021 4:53 PM    Patient Information:  Gloria Ulloa is a 72 y.o. female [id number]  PCP:  Nicky Shah MD (Tel: [phone] )    Chief complaint:    Chief Complaint   Patient presents with    Wound Infection     pt was seen by wound care and placed on antibiotics. wound on left leg getting worse        History of Present Illness:  Christiane Magallon is a 72 y.o. female who presented to the ED to be evaluated for acute worsening of LLE postoperative incision, despite recent change of antibiotics by her wound care physician. The area of concern is actually at the site of vein harvest for CABG procedure in May 2, 2002 1. Patient was initially placed on Levaquin and Bactrim, the latter agent being replaced by doxycycline 2 days PTA. Despite compliance with antibiotic regimen, the affected area is more erythematous and tender to touch. She also notes that her blood sugars have been more difficult to control since the infection seems to worsen. Of note, the patient also has a stage IV sacral decubitus ulceration which the family reports is no longer amenable to wound VAC therapy. Upon arrival to the ED, x-ray of the LLE was performed and interpreted as a shallow soft tissue defect with diffuse soft tissue swelling; no soft tissue gas or osteomyelitis appreciated. Labs were obtained and notable for acute hyponatremia 129, hyperglycemia 255, procalcitonin elevated 1.46, BNP 15,026, and chronic anemia. CXR obtained by admitting hospitalist to assess for CHF, and interpretation does confirm interstitial pulmonary edema with cardiomegaly. Patient will be admitted for complicated cellulitis failing outpatient treatment, as well as CHF exacerbation.       History obtained from patient and review of Epic chart    REVIEW OF SYSTEMS:   Constitutional: Negative for fever,chills,weight loss; positive generalized weakness  ENT: Negative for rhinorrhea, epistaxis, hoarseness, and sore throat. Respiratory: Positive for chronic shortness of breath, wheezing, and cough  Cardiovascular: Negative for chest pain, palpitations; bilateral peripheral edema; no orthopnea or PND  Gastrointestinal: Negative for N/V/D; no hematemesis, hematochezia, or melena; no anorexia  Genitourinary: Negative for dysuria, frequency, hesitancy, and urgency; no incontinence  Hematologic/Lymphatic: Negative for bleeding tendency, excessive bruising, and enlarged LN  Musculoskeletal: Positive for myalgias and arthalgias; able to ambulate without difficulty  Neurologic: Negative for LOC, seizure activity, paresthesias, dysarthria, vertigo, and gait disturbance  Skin: Stage IV sacral decubitus and L LE wound with cellulitis  Psychiatric: Positive for depression,anxiety, and agitation; denies SI/HI  Endocrine: Negative for polyuria/polydipsia/polyphagia; no heat/cold intolerance    Past Medical History:   has a past medical history of Atherosclerosis of native artery of right lower extremity with rest pain ([address] Back pain, Branch retinal vein occlusion, Bronchiectasis with acute exacerbation ([facility]), Closed compression fracture of thoracic vertebra ([address] Closed fracture of facial bone with routine healing, Closed jaw fracture ([address] Community acquired pneumonia of left lower lobe of lung, Compression fracture of L1 lumbar vertebra ([facility]), COPD (chronic obstructive pulmonary disease) ([address] Fracture of tibial plateau, closed, left, initial encounter, Minimally displaced zone I fracture of sacrum ([facility]), Mucus plugging of bronchi, Osteomyelitis of mandible, Osteoporosis with pathological fracture, Post herpetic neuralgia, Proximal humerus fracture, Rheumatoid arthritis ([address] Shingles, Sleep apnea, Temporal arteritis ([address] Tobacco use, Tracheomalacia, and Vitreous hemorrhage, right eye ([address]      Past Surgical History:   has a past surgical history that includes hernia repair; Mandible fracture surgery; Foot surgery; Elbow surgery; Cataract removal; Tubal ligation; Knee arthroscopy; Kyphosis surgery; laminectomy; Spinal fusion; Septoplasty (05/07/2013); Artery surgery (05/30/2013); Upper gastrointestinal endoscopy (04/08/2014); bronchoscopy; bronchoscopy (N/A, 06/12/2019); Mandible fracture surgery (02/2020); back surgery (08/2020); other surgical history (01/08/2021); Pressure ulcer debridement (N/A, 01/08/2021); Artery Biopsy (Right, 03/01/2021); Coronary artery bypass graft (N/A, 05/03/2021); Mediastinoscopy (N/A, 05/05/2021); and Cardiac surgery (05/03/2021). Medications:  No current facility-administered medications on file prior to encounter. Current Outpatient Medications on File Prior to Encounter   Medication Sig Dispense Refill    doxycycline hyclate (VIBRA-TABS) 100 MG tablet Take 1 tablet by mouth 2 times daily for 7 days -- continue your levofloxacin, add this antibiotic, but STOP the Bactrim (trimethoprpm-sulfamethoxazole). 14 tablet 0    levoFLOXacin (LEVAQUIN) 500 MG tablet Take 1 tablet by mouth daily for 7 days 7 tablet 0    azithromycin (ZITHROMAX) 250 MG tablet TAKE 1 TABLET BY MOUTH EVERY DAY 90 tablet 3    DALIRESP 500 MCG tablet TAKE 1 TABLET BY MOUTH DAILY 30 tablet 11    clopidogrel (PLAVIX) 75 MG tablet Take 1 tablet by mouth daily 30 tablet 11    metoprolol tartrate (LOPRESSOR) 25 MG tablet Take 0.5 tablets by mouth 2 times daily Hold this medication for pulse <87 or systolic BP <730 30 tablet 11    metroNIDAZOLE (FLAGYL) 250 MG tablet Crush one tablet into a fine powder, sprinkle into wound TIW as needed for malodor, at time of dressing change.  30 tablet 1    predniSONE (DELTASONE) 1 MG tablet Take 4 mg by mouth daily      insulin glargine (LANTUS) 100 UNIT/ML injection vial Inject 15 Units into the skin nightly 1 vial 0    oxyCODONE-acetaminophen (PERCOCET)  MG per Devices (ACAPELLA) MISC Take 1 Device by mouth as needed 1 each 0       Allergies: Allergies   Allergen Reactions    Atenolol      Cough          Social History:   reports that she quit smoking about 30 years ago. Her smoking use included cigarettes. She has a 20.00 pack-year smoking history. She has never used smokeless tobacco. She reports previous alcohol use. She reports that she does not use drugs. Family History:  family history includes Asthma in an other family member; Diabetes in her brother, mother, and sister; Heart Disease in her brother and brother; Hypertension in her mother.      Physical Exam:  BP (!) 106/56   Pulse 81   Temp 98.5 °F (36.9 °C) (Oral)   Resp 16   Ht 5' 8\" (1.727 m)   Wt 155 lb (70.3 kg)   SpO2 98%   BMI 23.57 kg/m²     General appearance: Elderly female who appears chronically ill  Eyes: Sclera clear without conjunctival injection; PERRLA; EOMI  ENT: Mucous membranes moist without thrush; normal dentition; notable mandibular deformity status post history of osteomyelitis  Neck: Supple without meningismus; no goiter; no carotid bruit bilaterally  Cardiovascular: Tachycardic rhythm without ectopy; normal S1-S2 with no murmurs; 2+ nonpitting peripheral edema; no JVD  Respiratory: No tachypnea; CTAB with diminished air exchange, no wheeze, rhonchi or rales  Gastrointestinal: Abdomen soft, non-tender, not distended; bowel sounds normal x4 quadrants; no masses/organomegaly appreciated  Musculoskeletal: FROM spine and extremities x4  Neurology: A&O x3; cranial nerves 2-12 grossly intact; motor 5/5  BUE/BLE; no seizure activity  Psychiatry: Well-groomed with good eye contact; appropriate affect; no visual/auditory hallucination  Skin: Stage IV sacral decubitus dressing C/D/I; L LE with lateral 2 cm punched out ulceration with surrounding erythema and edema; positive evidence of lymphangitic spread  PV: 1/4 radial and dorsalis pedis bilaterally; delayed cap refill with cool extremities    Labs:  CBC:   Lab Results   Component Value Date    WBC 6.5 07/11/2021    RBC 3.36 07/11/2021    HGB 9.3 07/11/2021    HCT 28.5 07/11/2021    MCV 84.9 07/11/2021    MCH 27.8 07/11/2021    MCHC 32.7 07/11/2021    RDW 15.6 07/11/2021     07/11/2021    MPV 7.4 07/11/2021     BMP:    Lab Results   Component Value Date     07/11/2021    K 4.0 07/11/2021    K 2.7 04/25/2021    CL 93 07/11/2021    CO2 24 07/11/2021    BUN 14 07/11/2021    CREATININE 1.0 07/11/2021    CALCIUM 9.1 07/11/2021    GFRAA >60 07/11/2021    GFRAA >60 05/07/2013    LABGLOM 55 07/11/2021    GLUCOSE 255 07/11/2021     XR TIBIA FIBULA LEFT (2 VIEWS)   Final Result   1. Shallow soft tissue defect over the lateral aspect of the proximal leg   with diffuse soft tissue swelling. No soft tissue gas. 2. No radiographic evidence of osteomyelitis or other acute osseous   abnormality. XR CHEST PORTABLE    (Results Pending)         EKG: Pending at time of dictation    I visualized CXR images and EKG strips personally and agree with documented interpretation    Discussed case  with ED provider    Problem List  Active Problems:    Pressure ulcer of coccygeal region, stage 4 ([address]    Cellulitis of left lower extremity    Hyponatremia    Cylindrical bronchiectasis ([address]    Immunocompromised state ([address]    Coronary artery disease    Uncontrolled type 2 diabetes mellitus with diabetic peripheral angiopathy without gangrene, with long-term current use of insulin ([address]    Hx of CABG    Cellulitis and abscess of left leg  Resolved Problems:    * No resolved hospital problems.  *        Assessment/Plan:     Wound infection with MRSA/Pseudomonas  -Patient admitted to telemetry floor for continuous observation and monitoring with appropriate isolation measures in place  -Blood, urine cultures to be collected; wound cultures already completed  -Patient initiated on IV vancomycin and Zosyn by ED provider  -Continue vancomycin as wound investigation and treatment of the above medically necessary diagnoses. Please note that some part of this chart was generated using Dragon dictation software. Although every effort was made to ensure the accuracy of this automated transcription, some errors in transcription may have occurred inadvertently. If you may need any clarification, please do not hesitate to contact me through [facility].        Sha Smart MD    7/11/2021 4:53 PM

## 2021-07-11 NOTE — CONSULTS
Infectious Diseases Inpatient Consult Note      Reason for Consult:  Left leg large open wound with abscess and on going cellulitis from the vein harvested site. Requesting Physician:  Jose Cain, [address]    Primary Care Physician:  Ambreen Ureña MD    History Obtained From:  [facility] and Patient     CHIEF COMPLAINT:     Chief Complaint   Patient presents with    Wound Infection     pt was seen by wound care and placed on antibiotics. wound on left leg getting worse         HISTORY OF PRESENT ILLNESS:  72 y.o. woman with CABG in May 30/2021 by Billy Mari  and now admitted with Left leg pain, swelling open non healing with drainage from the vein harvested site. She is immune suppressed from COPD, Bronchiectasis, RA and she has sacral decubitus ulcer as well with wound vac in place followed at [facility] wound care seen by Jovi Riggins and she was seen for Left leg open wound see images and recent out pt wound cx MRSA and PSEUDOMONAS was placed on oral Doxycyline and Levofloxacin but due to on going drainage, with worsening pain, swelling admitted for evaluation. Her WBC counts normal and no fevers despite significant infection indicating severely immune suppressed state. Location :   Left leg on going swelling, pain, local redness drainage+   Quality : aching      Severity : 10/10    Duration : 7 Days     Timing : intermittent   Context :  CABG surgery and vein harvest    Modifying factors : none   Associated signs and symptoms: Left leg pain, swelling.    She is on Enbrel and Prednisone for her RA        Past Medical History:    Past Medical History:   Diagnosis Date    Atherosclerosis of native artery of right lower extremity with rest pain ([address] 07/25/2017    Back pain     Branch retinal vein occlusion 07/20/2012    Bronchiectasis with acute exacerbation (HCC)     Closed compression fracture of thoracic vertebra ([address] 01/15/2020    Closed fracture of facial bone with routine healing 11/21/2016    Closed jaw fracture ([address] 01/15/2020    Community acquired pneumonia of left lower lobe of lung     Compression fracture of L1 lumbar vertebra ([address] 01/15/2020    COPD (chronic obstructive pulmonary disease) (HCC)     Fracture of tibial plateau, closed, left, initial encounter 12/05/2017    Minimally displaced zone I fracture of sacrum (HCC) 09/02/2020    Mucus plugging of bronchi     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 Bone density test last year showed severe osteoporosis. Recently, two broken vertebrae (L1, L2) due to coughing. Diagnosed with tracheomalacia and stated she must cough very hard to clear phlegm. Was coughing due to upper respiratory infections which have been treated. Hx of laminectomy and recent kyphoplasty.    Refractured her jawbone which was previously repaired wi    Post herpetic neuralgia     Proximal humerus fracture 10/01/2019    Rheumatoid arthritis ([address]     Shingles 05/2020    Sleep apnea     Temporal arteritis ([address] 07/10/2013    Tobacco use 10/19/2017    Tracheomalacia     Vitreous hemorrhage, right eye ([address] 02/21/2020       Past Surgical History:    Past Surgical History:   Procedure Laterality Date    ARTERY BIOPSY Right 03/01/2021    at [address]  05/30/2013    left temporal artery biopsy    BACK SURGERY  08/2020    BRONCHOSCOPY      BRONCHOSCOPY N/A 06/12/2019    BRONCHOSCOPY ALVEOLAR LAVAGE performed by Georgie Ahumada MD at [address]  05/03/2021    CATARACT REMOVAL      CORONARY ARTERY BYPASS GRAFT N/A 05/03/2021    CORONARY ARTERY BYPASS X3 WITH LEFT ATRIAL APPENDAGE CLIP, 5 LEVEL BILATERAL INTERCOSTAL NERVE BLOCK, STERNAL PLATING performed by Yulisa Leung MD at [address]      HERNIA REPAIR      KNEE ARTHROSCOPY      left    KYPHOSIS SURGERY      LAMINECTOMY      MANDIBLE FRACTURE SURGERY      MANDIBLE FRACTURE SURGERY  02/2020    MEDIASTINOSCOPY N/A 05/05/2021    MEDIASTINAL EXPLORATION AND EVACUATION OF HEMATOMA performed by Amirah Gonsalez MD at [address]  01/08/2021    sacral wound debridement    PRESSURE ULCER DEBRIDEMENT N/A 01/08/2021    SACRAL WOUND DEBRIDEMENT performed by Tanika Hyman MD at [address] SEPTOPLASTY  05/07/2013    FESS with balloon    SPINAL FUSION      TUBAL LIGATION      UPPER GASTROINTESTINAL ENDOSCOPY  04/08/2014    Dilitation       Current Medications:    Outpatient Medications Marked as Taking for the 7/11/21 encounter [facility] Encounter)   Medication Sig Dispense Refill    doxycycline hyclate (VIBRA-TABS) 100 MG tablet Take 1 tablet by mouth 2 times daily for 7 days -- continue your levofloxacin, add this antibiotic, but STOP the Bactrim (trimethoprpm-sulfamethoxazole). 14 tablet 0    levoFLOXacin (LEVAQUIN) 500 MG tablet Take 1 tablet by mouth daily for 7 days 7 tablet 0    azithromycin (ZITHROMAX) 250 MG tablet TAKE 1 TABLET BY MOUTH EVERY DAY 90 tablet 3    DALIRESP 500 MCG tablet TAKE 1 TABLET BY MOUTH DAILY 30 tablet 11    clopidogrel (PLAVIX) 75 MG tablet Take 1 tablet by mouth daily 30 tablet 11    metoprolol tartrate (LOPRESSOR) 25 MG tablet Take 0.5 tablets by mouth 2 times daily Hold this medication for pulse <45 or systolic BP <813 30 tablet 11    metroNIDAZOLE (FLAGYL) 250 MG tablet Crush one tablet into a fine powder, sprinkle into wound TIW as needed for malodor, at time of dressing change. 30 tablet 1    predniSONE (DELTASONE) 1 MG tablet Take 4 mg by mouth daily      insulin glargine (LANTUS) 100 UNIT/ML injection vial Inject 15 Units into the skin nightly 1 vial 0    oxyCODONE-acetaminophen (PERCOCET)  MG per tablet Take 1 tablet by mouth daily.        docusate sodium (COLACE) 100 MG capsule Take 100 mg by mouth 2 times daily as needed for Constipation      DULoxetine (CYMBALTA) 60 MG extended release capsule Take 60 mg by mouth daily      gabapentin (NEURONTIN) 300 MG capsule Take 300 mg by mouth 2 times daily.  latanoprost (XALATAN) 0.005 % ophthalmic solution Place 1 drop into both eyes nightly      Teriparatide, Recombinant, (FORTEO) 600 MCG/2.4ML SOPN injection Inject 20 mcg into the skin daily      NARCAN 4 MG/0.1ML LIQD nasal spray PLEASE SEE ATTACHED FOR DETAILED DIRECTIONS      morphine (MS CONTIN) 15 MG extended release tablet 15 mg 2 times daily.  ipratropium (ATROVENT) 0.06 % nasal spray USE 2 SPRAYS BY NASAL ROUTE 2-4 TIMES DAILY 1 Bottle 5    albuterol sulfate  (90 Base) MCG/ACT inhaler INHALE 2 PUFFS INTO THE LUNGS EVERY 4 HOURS AS NEEDED FOR WHEEZING 1 Inhaler 5    vitamin D (CHOLECALCIFEROL) 1000 UNIT TABS tablet Take 5,000 Units by mouth daily       calcium carbonate (OSCAL) 500 MG TABS tablet Take 500 mg by mouth daily      atorvastatin (LIPITOR) 40 MG tablet Take 40 mg by mouth      cyclobenzaprine (FLEXERIL) 10 MG tablet Take 10 mg by mouth 2 times daily as needed       meloxicam (MOBIC) 15 MG tablet Patient states taking only as needed      aspirin EC 81 MG EC tablet Take 1 tablet by mouth daily      insulin lispro (HUMALOG) 100 UNIT/ML injection vial Inject 0-12 Units into the skin 3 times daily (with meals)      fluticasone (FLONASE) 50 MCG/ACT nasal spray INHALE 2 SPRAYS IN EACH NOSTRIL DAILY 1 Bottle 5    cetirizine (ZYRTEC) 10 MG tablet Take 10 mg by mouth daily.  etanercept (ENBREL) 50 MG/ML injection Inject 50 mg into the skin every 7 days.       omeprazole (PRILOSEC) 40 MG capsule Take 40 mg by mouth daily          Allergies:  Atenolol    Immunizations :   Immunization History   Administered Date(s) Administered    COVID-19, Pfizer, PF, 30mcg/0.3mL 02/11/2021, 03/04/2021    Influenza, High Dose (Fluzone 65 yrs and older) 11/22/2017, 11/08/2018    PPD Test 12/02/2014  Pneumococcal Conjugate 13-valent ([id number]) 2013    Pneumococcal Conjugate 7-valent (Prevnar7) 2013    Pneumococcal Polysaccharide ([id number]) 10/23/2017    Td Vaccine >6yo [facility] 2007         Social History:   Social History     Tobacco Use    Smoking status: Former Smoker     Packs/day: 1.00     Years: 20.00     Pack years: 20.00     Types: Cigarettes     Quit date: 1991     Years since quittin.2    Smokeless tobacco: Never Used   Vaping Use    Vaping Use: Never used   Substance Use Topics    Alcohol use: Not Currently     Alcohol/week: 0.0 standard drinks     Comment: rarely    Drug use: No     Social History     Tobacco Use   Smoking Status Former Smoker    Packs/day: 1.00    Years: 20.00    Pack years: 20.00    Types: Cigarettes    Quit date: 1991    Years since quittin.2   Smokeless Tobacco Never Used      Family History   Problem Relation Age of Onset    Diabetes Mother     Hypertension Mother     Asthma Other     Heart Disease Brother     Diabetes Brother     Diabetes Sister     Heart Disease Brother     Cancer Neg Hx     Emphysema Neg Hx     Heart Failure Neg Hx       REVIEW OF SYSTEMS:     Constitutional:  negative for fevers, chills, night sweats  Eyes:  negative for blurred vision, eye discharge, visual disturbance   HEENT:  negative for hearing loss, ear drainage,nasal congestion  Respiratory:  cough+, shortness of breath + or hemoptysis   Cardiovascular:  negative for chest pain, palpitations, syncope  Gastrointestinal:  negative for nausea, vomiting, diarrhea, constipation, abdominal pain  Genitourinary:  negative for frequency, dysuria, urinary incontinence, hematuria  Hematologic/Lymphatic:  negative for easy bruising, bleeding and lymphadenopathy  Allergic/Immunologic:  negative for recurrent infections, angioedema, anaphylaxis   Endocrine:  negative for weight changes, polyuria, polydipsia and polyphagia  Musculoskeletal: Left leg pain, swelling, drainage+ open wound +   Integumentary: No rashes, skin lesions  Neurological:  negative for headaches, slurred speech, unilateral weakness  Psychiatric: negative for hallucinations,confusion,agitation.      PHYSICAL EXAM:      Vitals:    BP (!) 111/58   Pulse 92   Temp 98.1 °F (36.7 °C) (Oral)   Resp 16   Ht 5' 8\" (1.727 m)   Wt 155 lb (70.3 kg)   SpO2 96%   BMI 23.57 kg/m²     General Appearance: alert,in some acute distress, ++ pallor, no icterus chronic ill appearing woman + skin changes from steroids+   Skin: warm and dry, no rash or erythema  Head: normocephalic and atraumatic  Eyes: pupils equal, round, and reactive to light, conjunctivae normal  ENT: tympanic membrane, external ear and ear canal normal bilaterally, nose without deformity, nasal mucosa and turbinates normal without polyps  Neck: supple and non-tender without mass, no thyromegaly  no cervical lymphadenopathy  Pulmonary/Chest:  Coarse crepts+ wheezes, rales or rhonchi, normal air movement, no respiratory distress  Cardiovascular:   S1 and S2, no murmurs, rubs, clicks, or gallops, no carotid bruits CABG scar healing well   Abdomen: soft, non-tender, non-distended, normal bowel sounds, no masses or organomegaly  Extremities: no cyanosis, clubbing or edema  Musculoskeletal: normal range of motion, no joint swelling, deformity or tenderness  Integumentary: No rashes, no abnormal skin lesions, no petechiae  Neurologic: reflexes normal and symmetric, no cranial nerve deficit  Psych:  Orientation, sensorium, mood normal   Lines: IV  Left leg swelling and on going local redness, cellulitis medial side vein harvested area with deep wound slough and drainage++        DATA:    CBC:   Lab Results   Component Value Date    WBC 6.5 07/11/2021    HGB 9.3 (L) 07/11/2021    HCT 28.5 (L) 07/11/2021    MCV 84.9 07/11/2021     07/11/2021     RENAL:   Lab Results   Component Value Date    CREATININE 1.0 07/11/2021    BUN 14 07/11/2021     (L) 07/11/2021    K 4.0 07/11/2021    CL 93 (L) 07/11/2021    CO2 24 07/11/2021     SED RATE:   Lab Results   Component Value Date    SEDRATE 116 07/11/2021     CK: No results found for: CKTOTAL  CRP:   Lab Results   Component Value Date    CRP 4.0 04/12/2017     Hepatic Function Panel:   Lab Results   Component Value Date    ALKPHOS 177 07/11/2021    ALT 9 07/11/2021    AST 14 07/11/2021    PROT 7.3 07/11/2021    PROT 7.1 03/21/2013    BILITOT 0.3 07/11/2021    BILIDIR <0.2 05/03/2021    IBILI see below 05/03/2021    LABALBU 2.8 07/11/2021     UA:  Lab Results   Component Value Date    COLORU Yellow 04/26/2021    CLARITYU Clear 04/26/2021    GLUCOSEU Negative 04/26/2021    GLUCOSEU NEGATIVE 03/20/2010    BILIRUBINUR Negative 04/26/2021    BILIRUBINUR NEGATIVE 03/20/2010    KETUA Negative 04/26/2021    SPECGRAV 1.010 04/26/2021    BLOODU TRACE-INTACT 04/26/2021    PHUR 8.0 04/26/2021    PROTEINU Negative 04/26/2021    UROBILINOGEN 0.2 04/26/2021    NITRU Negative 04/26/2021    LEUKOCYTESUR Negative 04/26/2021    LABMICR YES 04/26/2021    URINETYPE NotGiven 04/26/2021      Urine Microscopic:   Lab Results   Component Value Date    BACTERIA 1+ 04/23/2021    WBCUA 0-2 04/26/2021    RBCUA 3-4 04/26/2021    EPIU 11-20 04/26/2021     Urine Reflex to Culture:   Lab Results   Component Value Date    URRFLXCULT Not Indicated 04/26/2021        Conclusions      Summary   Normal left ventricle systolic function with an estimated ejection fraction   of 55%. No regional wall motion abnormalities are seen. Normal left ventricular diastolic filling pressure. Mild eccentric aortic regurgitation. Mild mitral and tricuspid regurgitation. Systolic pulmonary artery pressure (SPAP) is normal and estimated at 27 mmHg   (right atrial pressure 3 mmHg).       Signature      ------------------------------------------------------------------   Electronically signed by Eleuterio Zhu MD, [facility]   (Interpreting flush  10 mL Intravenous 2 times per day    enoxaparin  40 mg Subcutaneous Daily    insulin glargine  0.25 Units/kg Subcutaneous Nightly    insulin lispro  0.05 Units/kg Subcutaneous TID WC    cefepime  2,000 mg Intravenous Q12H       Continuous Infusions:   dextrose      sodium chloride      sodium chloride         PRN Meds:  glucose, dextrose, glucagon (rDNA), dextrose, sodium chloride flush, sodium chloride, potassium chloride **OR** potassium alternative oral replacement **OR** potassium chloride, magnesium sulfate, promethazine **OR** ondansetron, senna, acetaminophen **OR** acetaminophen, oxyCODONE **OR** oxyCODONE    Imaging:   XR TIBIA FIBULA LEFT (2 VIEWS)   Final Result   1. Shallow soft tissue defect over the lateral aspect of the proximal leg   with diffuse soft tissue swelling. No soft tissue gas. 2. No radiographic evidence of osteomyelitis or other acute osseous   abnormality. XR CHEST PORTABLE    (Results Pending)       All pertinent images and reports for the current Hospitalization were reviewed by me.     IMPRESSION:    Patient Active Problem List   Diagnosis    Rheumatoid arthritis ([address]    Psoriasis    GERD (gastroesophageal reflux disease)    History of tobacco use    Hyponatremia    Anemia    Cylindrical bronchiectasis (HCC)    Tracheobronchomalacia    Immunocompromised state ([address]    Hypokalemia    Coronary artery disease    Stasis edema of both lower extremities    Essential hypertension    Lumbar spondylosis    Mitral valve insufficiency and aortic valve insufficiency    Mixed hyperlipidemia    Myopia of both eyes    Osteoporosis    Other chronic sinusitis    Primary open angle glaucoma (POAG) of both eyes, mild stage    Primary osteoarthritis of right hip    Type 2 diabetes mellitus with unspecified diabetic retinopathy without macular edema (HCC)    Uncontrolled type 2 diabetes mellitus with diabetic peripheral angiopathy without gangrene, with long-term current use of insulin (HCC)    Pressure ulcer of coccygeal region, stage 4 (HCC)    NSTEMI (non-ST elevated myocardial infarction) [facility]    Hx of CABG    Cellulitis of left lower extremity    Cellulitis and abscess of left leg     Left leg large open wound with abscess formation   LEF leg cellulitis  Left leg edema  CABG in May 2021   Vein harvested from Left leg per patient  RA   Osteoporosis  DM+  COPD  Bronchiectasis  Left leg wound cx Pseudomonas and MRSA recently on 7/7  Sacral decubitus ulcer stage 3  Profound immune suppression from ENBREL, Prednisone for RA    Given the exam she may need Incision and drainage or debridement to help it heal along with local care, needs leg elevation and IV abx therapy will get CT of the Left leg to check for any deep abscess or myositis     She will need surgical input to check for ID and drainage       Labs, Microbiology, Radiology and pertinent results from current hospitalization and care every where were reviewed by me as a part of the consultation. PLAN :  1. Cont IV Cefepime  X 2 gm Q 12 hrs  2. Cont IV Vancomycin x 1250 mg x q 24 HRS  3. D/c Enbrel for now  4. Cont home prednisone for now  5. Control DM   6. Check Hba1c  7. CT Left leg non contrast  8. Cont local care for the Sacral decubitus ulcer followed at [facility] wound care center   9. Will need surgery in put       Discussed with patient/Family and Nursing   Risk of Complications/Morbidity: High      · Illness(es)/ Infection present that pose threat to bodily function. · There is potential for severe exacerbation of infection/side effects of treatment. · Therapy requires intensive monitoring for antimicrobial agent toxicity. Thanks for allowing me to participate in your patient's care please call me with any questions or concerns.     Dr. Rosaura Arora MD  [facility] Physician  Phone: [phone]   Fax : [phone]

## 2021-07-11 NOTE — ED PROVIDER NOTES
I independently performed a history and physical on [facility]. All diagnostic, treatment, and disposition decisions were made by myself in conjunction with the advanced practice provider. For further details of [address] emergency department encounter, please see JASON Parker's documentation. Patient presents to the emergency department for \"leg infection. \"Patient states that she had open heart surgery by Dr. Estelle Osullivan early in the spring. Patient has had a scab along the left medial calf since the surgery. Scab fell off several weeks ago and patient has had progressive redness and pain since that time. She has been seen as an outpatient and was started on Bactrim/Levaquin last week. Wound care physician Dr. John Lee transitioned her to doxycycline late last week. Patient has continued with worsening symptoms. No fevers. Physical exam: General: No acute distress. Heart: Regular rate rhythm peer normal S1-S2. Lungs: Clear to auscultation bilaterally. Extremities: Noted wound of left medial calf. Overlying erythema that extends to the ankle and proximally to mid thigh. Assessment/plan:   1. Left leg cellulitis    2.  Failure of outpatient treatment           Nereida Sandhoff,   07/11/21 1640       Nereida Sandhoff,   07/11/21 2056

## 2021-07-11 NOTE — PROGRESS NOTES
Consult called to cardiothoracic surgery [email or web address] ,No CT surgeon  so diverted to cardiology per answering service.    RN aware of it  [employer]

## 2021-07-11 NOTE — CONSULTS
Pharmacy Note  Vancomycin Consult    Sandra Joseph is a 72 y.o. female started on Vancomycin for wound infection; consult received from Dr. Dima Vega to manage therapy. Also receiving the following antibiotics: cefepime. Allergies:  Atenolol       Recent Labs     07/11/21  1450   CREATININE 1.0       Recent Labs     07/11/21  1450   WBC 6.5       Estimated Creatinine Clearance: 54 mL/min (based on SCr of 1 mg/dL). Intake/Output Summary (Last 24 hours) at 7/11/2021 1818  Last data filed at 7/11/2021 1630  Gross per 24 hour   Intake 48.59 ml   Output --   Net 48.59 ml       Wt Readings from Last 1 Encounters:   07/11/21 155 lb (70.3 kg)         Body mass index is 23.57 kg/m². Loading dose (critically ill or in ICU, require dialysis or renal replacement therapy): Vancomycin 25 mg/kg IVPB x 1 (maximum 3000 mg). Maintenance dose: 15 mg/kg (maximum: 2000 mg/dose and 4500 mg/day) starting at the next dosing interval determined by renal function  Pulse dose: fluctuating renal function, ISI, ESRD   Goal Vancomycin trough: 10-15 mcg/mL or 15-20 mcg/mL   Goal Vancomycin AUC: 400-600     Assessment/Plan:   Vancomycin 1 gram IVPB x 1 given at 1632 today. Will follow follow with Vancomycn 1250mg IVPB q24h. Calculated . Vancomycin trough ordered for 7/14 1600 dose. Timing of trough level will be determined based on culture results, renal function, and clinical response. Thank you for the consult. Suyapa Felix [facility]  7/11/2021 at 6:20 PM    7/14/21  Vancomycin Day ____4____  Current Dosing: ___1,250 mg q24h _____  Recent Labs     07/13/21  1501   VANCOMYCIN lvl 8.9*     Recent Labs     07/11/21  1450 07/12/21  0624 07/13/21  0628   CREATININE 1.0 0.8 0.8   WBC 6.5 4.9 3.3*   Estimated Creatinine Clearance: 67 mL/min (based on SCr of 0.8 mg/dL). Vancomycin level drawn after 2 doses, will order a trough level for 7/15 at 1500. Continue current dose of 1,250 mg every 24 hours.       Emma Casillas

## 2021-07-11 NOTE — PROGRESS NOTES
Admits to room 359. Pt is alert and oriented x 4. VSS. Oriented to room. Daughter at bedside. Safety precautions in place. Call light in reach. Able to make needs known.

## 2021-07-11 NOTE — PROGRESS NOTES
Wound vac dressing to coccyx removed. Wound vac stopped working on the way to the hospital, so daughter removed the vac and left the dressing. Wound to LLE covered. Wound care consult ordered.

## 2021-07-12 ENCOUNTER — ANESTHESIA EVENT (OUTPATIENT)
Dept: OPERATING ROOM | Age: 71
DRG: 856 | End: 2021-07-12
Payer: MEDICARE

## 2021-07-12 PROBLEM — T81.31XA SURGICAL WOUND DEHISCENCE, INITIAL ENCOUNTER: Status: ACTIVE | Noted: 2021-07-12

## 2021-07-12 PROBLEM — E44.1 MILD MALNUTRITION (HCC): Status: ACTIVE | Noted: 2021-07-12

## 2021-07-12 LAB
A/G RATIO: 0.9 (ref 1.1–2.2)
ALBUMIN SERPL-MCNC: 3 G/DL (ref 3.4–5)
ALP BLD-CCNC: 123 U/L (ref 40–129)
ALT SERPL-CCNC: 6 U/L (ref 10–40)
ANION GAP SERPL CALCULATED.3IONS-SCNC: 10 MMOL/L (ref 3–16)
AST SERPL-CCNC: 11 U/L (ref 15–37)
BASOPHILS ABSOLUTE: 0 K/UL (ref 0–0.2)
BASOPHILS RELATIVE PERCENT: 0.7 %
BILIRUB SERPL-MCNC: 0.3 MG/DL (ref 0–1)
BUN BLDV-MCNC: 10 MG/DL (ref 7–20)
C-REACTIVE PROTEIN: 151.9 MG/L (ref 0–5.1)
CALCIUM SERPL-MCNC: 8.5 MG/DL (ref 8.3–10.6)
CHLORIDE BLD-SCNC: 98 MMOL/L (ref 99–110)
CO2: 24 MMOL/L (ref 21–32)
CREAT SERPL-MCNC: 0.8 MG/DL (ref 0.6–1.2)
CREATININE URINE: 89.4 MG/DL (ref 28–259)
EKG ATRIAL RATE: 95 BPM
EKG DIAGNOSIS: NORMAL
EKG P AXIS: 76 DEGREES
EKG P-R INTERVAL: 188 MS
EKG Q-T INTERVAL: 374 MS
EKG QRS DURATION: 102 MS
EKG QTC CALCULATION (BAZETT): 469 MS
EKG R AXIS: -14 DEGREES
EKG T AXIS: 60 DEGREES
EKG VENTRICULAR RATE: 95 BPM
EOSINOPHILS ABSOLUTE: 0.4 K/UL (ref 0–0.6)
EOSINOPHILS RELATIVE PERCENT: 8.9 %
GFR AFRICAN AMERICAN: >60
GFR NON-AFRICAN AMERICAN: >60
GLOBULIN: 3.5 G/DL
GLUCOSE BLD-MCNC: 131 MG/DL (ref 70–99)
GLUCOSE BLD-MCNC: 185 MG/DL (ref 70–99)
GLUCOSE BLD-MCNC: 192 MG/DL (ref 70–99)
GLUCOSE BLD-MCNC: 218 MG/DL (ref 70–99)
GLUCOSE BLD-MCNC: 66 MG/DL (ref 70–99)
GLUCOSE BLD-MCNC: 75 MG/DL (ref 70–99)
HCT VFR BLD CALC: 24.6 % (ref 36–48)
HEMOGLOBIN: 8.2 G/DL (ref 12–16)
LYMPHOCYTES ABSOLUTE: 0.6 K/UL (ref 1–5.1)
LYMPHOCYTES RELATIVE PERCENT: 12.7 %
MAGNESIUM: 1.5 MG/DL (ref 1.8–2.4)
MCH RBC QN AUTO: 28 PG (ref 26–34)
MCHC RBC AUTO-ENTMCNC: 33.4 G/DL (ref 31–36)
MCV RBC AUTO: 83.9 FL (ref 80–100)
MONOCYTES ABSOLUTE: 0.4 K/UL (ref 0–1.3)
MONOCYTES RELATIVE PERCENT: 7.2 %
NEUTROPHILS ABSOLUTE: 3.5 K/UL (ref 1.7–7.7)
NEUTROPHILS RELATIVE PERCENT: 70.5 %
OSMOLALITY URINE: 421 MOSM/KG (ref 390–1070)
OSMOLALITY: 286 MOSM/KG (ref 280–301)
PDW BLD-RTO: 15 % (ref 12.4–15.4)
PERFORMED ON: ABNORMAL
PERFORMED ON: NORMAL
PLATELET # BLD: 209 K/UL (ref 135–450)
PMV BLD AUTO: 7.4 FL (ref 5–10.5)
POTASSIUM REFLEX MAGNESIUM: 3.3 MMOL/L (ref 3.5–5.1)
PREALBUMIN: 8.2 MG/DL (ref 20–40)
RBC # BLD: 2.93 M/UL (ref 4–5.2)
SODIUM BLD-SCNC: 132 MMOL/L (ref 136–145)
SODIUM URINE: <20 MMOL/L
TOTAL PROTEIN: 6.5 G/DL (ref 6.4–8.2)
WBC # BLD: 4.9 K/UL (ref 4–11)

## 2021-07-12 PROCEDURE — 97535 SELF CARE MNGMENT TRAINING: CPT

## 2021-07-12 PROCEDURE — 97166 OT EVAL MOD COMPLEX 45 MIN: CPT

## 2021-07-12 PROCEDURE — 93010 ELECTROCARDIOGRAM REPORT: CPT | Performed by: INTERNAL MEDICINE

## 2021-07-12 PROCEDURE — 94761 N-INVAS EAR/PLS OXIMETRY MLT: CPT

## 2021-07-12 PROCEDURE — 83735 ASSAY OF MAGNESIUM: CPT

## 2021-07-12 PROCEDURE — 97530 THERAPEUTIC ACTIVITIES: CPT

## 2021-07-12 PROCEDURE — 2700000000 HC OXYGEN THERAPY PER DAY

## 2021-07-12 PROCEDURE — 97606 NEG PRS WND THER DME>50 SQCM: CPT

## 2021-07-12 PROCEDURE — 2580000003 HC RX 258: Performed by: HOSPITALIST

## 2021-07-12 PROCEDURE — 99024 POSTOP FOLLOW-UP VISIT: CPT | Performed by: NURSE PRACTITIONER

## 2021-07-12 PROCEDURE — 97116 GAIT TRAINING THERAPY: CPT

## 2021-07-12 PROCEDURE — 6360000002 HC RX W HCPCS: Performed by: HOSPITALIST

## 2021-07-12 PROCEDURE — 97162 PT EVAL MOD COMPLEX 30 MIN: CPT

## 2021-07-12 PROCEDURE — 80053 COMPREHEN METABOLIC PANEL: CPT

## 2021-07-12 PROCEDURE — 99231 SBSQ HOSP IP/OBS SF/LOW 25: CPT | Performed by: INTERNAL MEDICINE

## 2021-07-12 PROCEDURE — 6370000000 HC RX 637 (ALT 250 FOR IP): Performed by: NURSE PRACTITIONER

## 2021-07-12 PROCEDURE — 6370000000 HC RX 637 (ALT 250 FOR IP): Performed by: INTERNAL MEDICINE

## 2021-07-12 PROCEDURE — 85025 COMPLETE CBC W/AUTO DIFF WBC: CPT

## 2021-07-12 PROCEDURE — 99223 1ST HOSP IP/OBS HIGH 75: CPT | Performed by: INTERNAL MEDICINE

## 2021-07-12 PROCEDURE — 94660 CPAP INITIATION&MGMT: CPT

## 2021-07-12 PROCEDURE — 1200000000 HC SEMI PRIVATE

## 2021-07-12 PROCEDURE — 6370000000 HC RX 637 (ALT 250 FOR IP): Performed by: HOSPITALIST

## 2021-07-12 PROCEDURE — P9047 ALBUMIN (HUMAN), 25%, 50ML: HCPCS | Performed by: HOSPITALIST

## 2021-07-12 RX ORDER — CASTOR OIL AND BALSAM, PERU 788; 87 MG/G; MG/G
OINTMENT TOPICAL 2 TIMES DAILY
Status: DISCONTINUED | OUTPATIENT
Start: 2021-07-12 | End: 2021-07-15 | Stop reason: HOSPADM

## 2021-07-12 RX ADMIN — MORPHINE SULFATE 15 MG: 15 TABLET, FILM COATED, EXTENDED RELEASE ORAL at 09:34

## 2021-07-12 RX ADMIN — IPRATROPIUM BROMIDE 2 SPRAY: 42 SPRAY NASAL at 21:44

## 2021-07-12 RX ADMIN — PREDNISONE 4 MG: 1 TABLET ORAL at 09:36

## 2021-07-12 RX ADMIN — POTASSIUM CHLORIDE 40 MEQ: 1500 TABLET, EXTENDED RELEASE ORAL at 13:13

## 2021-07-12 RX ADMIN — Medication 5000 UNITS: at 09:33

## 2021-07-12 RX ADMIN — IPRATROPIUM BROMIDE 2 SPRAY: 42 SPRAY NASAL at 17:58

## 2021-07-12 RX ADMIN — ASPIRIN 81 MG: 81 TABLET, COATED ORAL at 09:35

## 2021-07-12 RX ADMIN — GABAPENTIN 300 MG: 300 CAPSULE ORAL at 09:35

## 2021-07-12 RX ADMIN — LATANOPROST 1 DROP: 50 SOLUTION OPHTHALMIC at 21:44

## 2021-07-12 RX ADMIN — CASTOR OIL AND BALSAM, PERU: 788; 87 OINTMENT TOPICAL at 15:54

## 2021-07-12 RX ADMIN — CALCIUM 500 MG: 500 TABLET ORAL at 09:35

## 2021-07-12 RX ADMIN — INSULIN LISPRO 4 UNITS: 100 INJECTION, SOLUTION INTRAVENOUS; SUBCUTANEOUS at 17:55

## 2021-07-12 RX ADMIN — FLUTICASONE PROPIONATE 1 SPRAY: 50 SPRAY, METERED NASAL at 09:37

## 2021-07-12 RX ADMIN — GABAPENTIN 300 MG: 300 CAPSULE ORAL at 21:43

## 2021-07-12 RX ADMIN — CYCLOBENZAPRINE 10 MG: 10 TABLET, FILM COATED ORAL at 21:49

## 2021-07-12 RX ADMIN — INSULIN LISPRO 2 UNITS: 100 INJECTION, SOLUTION INTRAVENOUS; SUBCUTANEOUS at 17:55

## 2021-07-12 RX ADMIN — PANTOPRAZOLE SODIUM 40 MG: 40 TABLET, DELAYED RELEASE ORAL at 05:04

## 2021-07-12 RX ADMIN — CLOPIDOGREL BISULFATE 75 MG: 75 TABLET ORAL at 09:36

## 2021-07-12 RX ADMIN — INSULIN LISPRO 1 UNITS: 100 INJECTION, SOLUTION INTRAVENOUS; SUBCUTANEOUS at 21:40

## 2021-07-12 RX ADMIN — VANCOMYCIN HYDROCHLORIDE 1250 MG: 10 INJECTION, POWDER, LYOPHILIZED, FOR SOLUTION INTRAVENOUS at 15:59

## 2021-07-12 RX ADMIN — ALBUMIN (HUMAN) 25 G: 0.25 INJECTION, SOLUTION INTRAVENOUS at 11:45

## 2021-07-12 RX ADMIN — INSULIN LISPRO 4 UNITS: 100 INJECTION, SOLUTION INTRAVENOUS; SUBCUTANEOUS at 13:20

## 2021-07-12 RX ADMIN — ROFLUMILAST 500 MCG: 500 TABLET ORAL at 09:36

## 2021-07-12 RX ADMIN — ALBUMIN (HUMAN) 25 G: 0.25 INJECTION, SOLUTION INTRAVENOUS at 04:21

## 2021-07-12 RX ADMIN — CEFEPIME 2000 MG: 2 INJECTION, POWDER, FOR SOLUTION INTRAMUSCULAR; INTRAVENOUS at 10:10

## 2021-07-12 RX ADMIN — SODIUM CHLORIDE, PRESERVATIVE FREE 10 ML: 5 INJECTION INTRAVENOUS at 21:41

## 2021-07-12 RX ADMIN — DULOXETINE HYDROCHLORIDE 60 MG: 60 CAPSULE, DELAYED RELEASE ORAL at 10:07

## 2021-07-12 RX ADMIN — ENOXAPARIN SODIUM 40 MG: 40 INJECTION SUBCUTANEOUS at 09:36

## 2021-07-12 RX ADMIN — Medication 1 LOZENGE: at 00:44

## 2021-07-12 RX ADMIN — SODIUM CHLORIDE 25 ML: 9 INJECTION, SOLUTION INTRAVENOUS at 10:08

## 2021-07-12 RX ADMIN — IPRATROPIUM BROMIDE 2 SPRAY: 42 SPRAY NASAL at 09:40

## 2021-07-12 RX ADMIN — INSULIN GLARGINE 18 UNITS: 100 INJECTION, SOLUTION SUBCUTANEOUS at 21:39

## 2021-07-12 RX ADMIN — IPRATROPIUM BROMIDE 2 SPRAY: 42 SPRAY NASAL at 13:22

## 2021-07-12 RX ADMIN — ATORVASTATIN CALCIUM 40 MG: 40 TABLET, FILM COATED ORAL at 21:42

## 2021-07-12 RX ADMIN — MORPHINE SULFATE 15 MG: 15 TABLET, FILM COATED, EXTENDED RELEASE ORAL at 21:42

## 2021-07-12 RX ADMIN — MAGNESIUM SULFATE HEPTAHYDRATE 2000 MG: 40 INJECTION, SOLUTION INTRAVENOUS at 13:19

## 2021-07-12 RX ADMIN — CETIRIZINE HYDROCHLORIDE 10 MG: 10 TABLET, FILM COATED ORAL at 09:35

## 2021-07-12 RX ADMIN — OXYCODONE 10 MG: 5 TABLET ORAL at 04:21

## 2021-07-12 RX ADMIN — CASTOR OIL AND BALSAM, PERU: 788; 87 OINTMENT TOPICAL at 21:45

## 2021-07-12 RX ADMIN — CEFEPIME 2000 MG: 2 INJECTION, POWDER, FOR SOLUTION INTRAMUSCULAR; INTRAVENOUS at 21:41

## 2021-07-12 ASSESSMENT — PAIN DESCRIPTION - ORIENTATION
ORIENTATION: LEFT
ORIENTATION: LEFT
ORIENTATION: RIGHT;LEFT
ORIENTATION: LEFT

## 2021-07-12 ASSESSMENT — PAIN DESCRIPTION - DESCRIPTORS
DESCRIPTORS: ACHING;CONSTANT;SORE
DESCRIPTORS: ACHING;CONSTANT;SORE;SHARP

## 2021-07-12 ASSESSMENT — PAIN SCALES - GENERAL
PAINLEVEL_OUTOF10: 8
PAINLEVEL_OUTOF10: 10
PAINLEVEL_OUTOF10: 8
PAINLEVEL_OUTOF10: 9
PAINLEVEL_OUTOF10: 8
PAINLEVEL_OUTOF10: 7

## 2021-07-12 ASSESSMENT — PAIN DESCRIPTION - LOCATION
LOCATION: GENERALIZED
LOCATION: LEG
LOCATION: BUTTOCKS
LOCATION: LEG
LOCATION: BUTTOCKS
LOCATION: LEG
LOCATION: LEG

## 2021-07-12 ASSESSMENT — PAIN SCALES - WONG BAKER: WONGBAKER_NUMERICALRESPONSE: 6

## 2021-07-12 ASSESSMENT — PAIN DESCRIPTION - FREQUENCY: FREQUENCY: CONTINUOUS

## 2021-07-12 ASSESSMENT — PAIN DESCRIPTION - PAIN TYPE
TYPE: ACUTE PAIN
TYPE: ACUTE PAIN

## 2021-07-12 ASSESSMENT — PAIN - FUNCTIONAL ASSESSMENT: PAIN_FUNCTIONAL_ASSESSMENT: PREVENTS OR INTERFERES SOME ACTIVE ACTIVITIES AND ADLS

## 2021-07-12 ASSESSMENT — PAIN DESCRIPTION - ONSET: ONSET: ON-GOING

## 2021-07-12 ASSESSMENT — PAIN DESCRIPTION - PROGRESSION: CLINICAL_PROGRESSION: NOT CHANGED

## 2021-07-12 NOTE — RT PROTOCOL NOTE
RT Inhaler-Nebulizer Bronchodilator Protocol Note    There is a bronchodilator order in the chart from a provider indicating to follow the RT Bronchodilator Protocol and there is an Initiate RT Bronchodilator Protocol order as well (see protocol at bottom of note). The findings from the last RT Protocol Assessment were as follows:  Smoking: <15 Pack years  Surgical Status: No surgery  Xray: X-ray not available  Respiratory Pattern: RR 12-20  Mental Status: Alert and Oriented  Breath Sounds: Clear  Cough: Strong, spontaneous, non-productive  Activity Level: Walking with assistance  Oxygen Requirement: Room Air - 2LNC/28% or home setting  Indication for Bronchodilator Therapy: On home bronchodilators  Bronchodilator Assessment Score: 1    Aerosolized bronchodilator medication orders have been revised according to the RT Bronchodilator Protocol. RT Inhaler-Nebulizer Bronchodilator Protocol:    Respiratory Therapist to perform RT Therapy Protocol Assessment then follow the protocol. No Indications - adjust the frequency to every 6 hours PRN wheezing or bronchospasm, if no treatments needed after 48 hours then discontinue using Per Protocol order mode. If indication present, adjust the RT bronchodilator orders based on the Bronchodilator Assessment Score as follows:    0-6 - enter or revise RT bronchodilator order to Albuterol Inhaler order with frequency of every 2 hours PRN for wheezing or increased work of breathing using Per Protocol order mode. If Albuterol Inhaler not tolerated or not effective, then discontinue the Albuterol Inhaler order and enter Albuterol Nebulizer order with same frequency and PRN reasons. Repeat RT Therapy Protocol Assessment as needed.     7-10 - discontinue any other Inpatient aerosolized bronchodilator medication orders and enter or revise two Albuterol Inhaler orders, one with BID frequency and one with frequency of every 2 hours PRN wheezing or increased work of breathing using Per Protocol order mode. Repeat RT Therapy Protocol Assessment with second treatment then BID and as needed. If Albuterol Inhaler not tolerated or not effective, then discontinue the Albuterol Inhaler orders and enter two Albuterol Nebulizer orders with same frequencies and PRN reasons. 11-13 - discontinue any other Inpatient aerosolized bronchodilator medication orders and enter DuoNeb Nebulizer orders QID frequency and an Albuterol Nebulizer order every 2 hours PRN wheezing or increased work of breathing using Per Protocol order mode. Repeat RT Therapy Protocol Assessment with second treatment then QID and as needed. Greater than 13 - discontinue any other Inpatient bronchodilator aerosolized medication orders and enter DuoNeb Nebulizer order every 4 hours frequency and Albuterol Nebulizer every 2 hours PRN wheezing or increased work of breathing using Per Protocol order mode. Repeat RT Therapy Protocol Assessment with second treatment then every 4 hours and as needed. RT to enter RT Home Evaluation for COPD & MDI Assessment order using Per Protocol order mode.     Electronically signed by Brittaney Kauffman RCP on 7/11/2021 at 9:16 PM

## 2021-07-12 NOTE — PROGRESS NOTES
Consult for left lower leg harvest graft site infection. This patient is being followed by Dr Luz Marina Mcconnell at [facility] wound care center. Call to [facility] and notified patient is now inpatient. Has a stage 4 on sacrum being treated by negative pressure wound therapy. Call to Dr Azeem Nunes, order received to start NPWT to sacrum. VAC ordered from central service. Wound care to see today and place VAC and evaluate left lower leg.

## 2021-07-12 NOTE — PROGRESS NOTES
Occupational Therapy   Occupational Therapy Initial Assessment and treatment    Date: 2021   Patient Name: Lou Boone  MRN: [id number]     : 1950    Date of Service: 2021    Discharge Recommendations:  Subacute/Skilled Nursing Facility       Assessment   Performance deficits / Impairments: Decreased functional mobility ; Decreased ADL status; Decreased strength;Decreased safe awareness;Decreased endurance;Decreased balance  Assessment: Pt admitted with cellulitis with open wound to E from vein harvest site, had CABG in May 2021. Pt also with stage 4 sacral wound, wound care to add wound vac per notes. Pt with decline in function, requiring mod A x 2 for all transfers and taking a few small steps, as pt with decreaseed ability to bear weight on L LE. Pt fatigues easily with min activity, and L LE with increased drainage with mobility, therefore returned to supine and L LE elevated on pillows. Recommend SNF d/t assist levels, activity tolerance and ongoing medical needs. Prognosis: Fair  OT Education: OT Role;Plan of Care;Home Exercise Program;ADL Adaptive Strategies;Transfer Training  Patient Education: disease specific: transfers, bed mobility, importnace of OOB  REQUIRES OT FOLLOW UP: Yes  Activity Tolerance  Activity Tolerance: Treatment limited secondary to medical complications (free text); Patient limited by fatigue  Activity Tolerance: 108/52 HR 95 O2 90% on 2L O2, fatigues easily with mobility, L LE wound with increased drainage with mobility  Safety Devices  Safety Devices in place: Yes  Type of devices: Gait belt;Bed alarm in place; Left in bed;Nurse notified;Call light within reach           Patient Diagnosis(es): The primary encounter diagnosis was Left leg cellulitis. A diagnosis of Failure of outpatient treatment was also pertinent to this visit.      has a past medical history of Atherosclerosis of native artery of right lower extremity with rest pain ([address] Back pain, Branch retinal vein occlusion, Bronchiectasis with acute exacerbation (HCC), Closed compression fracture of thoracic vertebra (HCC), Closed fracture of facial bone with routine healing, Closed jaw fracture ([address] Community acquired pneumonia of left lower lobe of lung, Compression fracture of L1 lumbar vertebra ([address] COPD (chronic obstructive pulmonary disease) ([address] Fracture of tibial plateau, closed, left, initial encounter, Minimally displaced zone I fracture of sacrum ([address] Mucus plugging of bronchi, Osteomyelitis of mandible, Osteoporosis with pathological fracture, Post herpetic neuralgia, Proximal humerus fracture, Rheumatoid arthritis ([address] Shingles, Sleep apnea, Temporal arteritis ([address] Tobacco use, Tracheomalacia, and Vitreous hemorrhage, right eye ([address] has a past surgical history that includes hernia repair; Mandible fracture surgery; Foot surgery; Elbow surgery; Cataract removal; Tubal ligation; Knee arthroscopy; Kyphosis surgery; laminectomy; Spinal fusion; Septoplasty (05/07/2013); Artery surgery (05/30/2013); Upper gastrointestinal endoscopy (04/08/2014); bronchoscopy; bronchoscopy (N/A, 06/12/2019); Mandible fracture surgery (02/2020); back surgery (08/2020); other surgical history (01/08/2021); Pressure ulcer debridement (N/A, 01/08/2021); Artery Biopsy (Right, 03/01/2021); Coronary artery bypass graft (N/A, 05/03/2021); Mediastinoscopy (N/A, 05/05/2021); and Cardiac surgery (05/03/2021).            Restrictions  Restrictions/Precautions  Restrictions/Precautions: General Precautions, Contact Precautions  Position Activity Restriction  Other position/activity restrictions: up wit assist, 2L )2, L LE wound, sacral wound to be getting wound vac    Subjective   General  Chart Reviewed: Yes  Patient assessed for rehabilitation services?: Yes  Family / Caregiver Present: No  Referring Practitioner: Bandar Estrada MD  Diagnosis: L LE cellulitis  Subjective  Subjective: Pt supine, agreeable  General Comment  Comments: RN clears for therapy    Social/Functional History  Social/Functional History  Lives With: Family (granddaughter)  Type of Home: House  Home Layout: One level  Home Access: Level entry  Bathroom Shower/Tub: Walk-in shower, Shower chair with back  Bathroom Toilet: Handicap height (BSC over toilet)  Bathroom Equipment: Grab bars in shower, Grab bars around toilet  Home Equipment: Rolling walkerYael (sleeps in recliner)  ADL Assistance: [address]: Needs assistance (family does)  Homemaking Responsibilities: No  Ambulation Assistance: Independent (RW)  Transfer Assistance: Independent  Active : No  Occupation: Retired  Type of occupation: [employer]  Leisure & Hobbies: sit by pool       Objective   Vision: Impaired  Vision Exceptions: Wears glasses at all times  Hearing: Exceptions to [facility]    Orientation  Overall Orientation Status: Within Functional Limits     Balance  Sitting Balance: Supervision  Standing Balance:  (mod A x 2)  Standing Balance  Time: ~30 sec x 2  Activity: transfer EOB<>BSC  Comment: mod A x 2 with RW, pt with difficulty bearing weight through L LE  Functional Mobility  Functional - Mobility Device: Rolling Walker  Activity: Other  Assist Level: Dependent/Total  Functional Mobility Comments: mod  A x 2 d/t decreased WBing L LE  Toilet Transfers  Toilet - Technique: Ambulating  Equipment Used: Extra wide bedside commode  Toilet Transfer: 2 Person assistance; Moderate assistance  ADL  Feeding: Setup  Grooming: Supervision  LE Dressing: Dependent/Total (socks)  Toileting: Contact guard assistance  Additional Comments: pt with increased L LE pain and drainage with mobility  Tone RUE  RUE Tone: Normotonic  Tone LUE  LUE Tone: Normotonic  Coordination  Movements Are Fluid And Coordinated: Yes        Transfers  Sit to stand:  Moderate assistance;2 Person assistance  Stand to sit: Moderate assistance;2 Person assistance     Cognition  Overall Cognitive Status: Exceptions  Arousal/Alertness: Delayed responses to stimuli  Following Commands: Follows one step commands with repetition; Follows one step commands with increased time  Attention Span: Attends with cues to redirect  Safety Judgement: Decreased awareness of need for safety  Insights: Decreased awareness of deficits  Initiation: Does not require cues  Sequencing: Does not require cues  Cognition Comment: pt intermittently lethargic this date, will fall asleep if not engaged        Sensation  Overall Sensation Status: WFL        LUE AROM (degrees)  LUE AROM : WFL  Left Hand AROM (degrees)  Left Hand AROM: WFL  RUE AROM (degrees)  RUE AROM : WFL  Right Hand AROM (degrees)  Right Hand AROM: WFL  LUE Strength  Gross LUE Strength: WFL  RUE Strength  Gross RUE Strength: WFL           Plan   Plan  Times per week: 3-5x/wk      AM-PAC Score        AM-[facility] Inpatient Daily Activity Raw Score: 15 (07/12/21 1436)  AM-PAC Inpatient ADL T-Scale Score : 34.69 (07/12/21 1436)  ADL Inpatient CMS 0-100% Score: 56.46 (07/12/21 1436)  ADL Inpatient CMS G-Code Modifier : CK (07/12/21 1436)    Goals  Short term goals  Time Frame for Short term goals: 1 week by 7/19  Short term goal 1: Pt will complete functional transfers with min A or less  Short term goal 2: Pt will complete LB dressing with min A or less  Short term goal 3: Pt will tolerate standing at sink for 2-3 grooming tasks w/o fatigue  Patient Goals   Patient goals : \"Go home\"       Therapy Time   Individual Concurrent Group Co-treatment   Time In 1053         Time Out 1132         Minutes 39         Timed Code Treatment Minutes: 29 Minutes (10 min eval)      If pt discharges prior to next session, this note will serve as discharge summary. See case management note for discharge disposition.      Argelia Sánchez, OT

## 2021-07-12 NOTE — PLAN OF CARE
Nutrition Problem #1: Increased nutrient needs  Intervention: Food and/or Nutrient Delivery: Modify Current Diet, Start Oral Nutrition Supplement  Nutritional Goals: Consume 50% or greater of 3 meals per day and ONS

## 2021-07-12 NOTE — PROGRESS NOTES
Physical Therapy    Facility/Department: [facility] B3 - MED SURG  Initial Assessment    NAME: Andrzej Cotton  : 1950  MRN: [id number]    Date of Service: 2021    Discharge Recommendations:  Subacute/Skilled Nursing Facility   PT Equipment Recommendations  Equipment Needed: No  Other: defer    Assessment   Body structures, Functions, Activity limitations: Decreased functional mobility ; Decreased ROM; Decreased strength;Decreased endurance; Increased pain  Assessment: Pt is 71 yo female presenting with cellulitis and abscess of L leg. Pt has had difficulty healing wound since vein was harvested at that site for CABG in May 2021. Pt also has Stage IV sacral wound. Pt lives with daughter and is ind at baseline. Today Pt's left leg was swollen, red, painful and the dressing was saturated with drainage upon approach. Pt required Mod Ax2 for bed mobility and transferring 3 steps  to [facility] with RW. Demonstrated very limited WB through LLE d/t pain and swelling. Pt was returned to bed to elevate leg after drainage was dripping on floor when sitting on BSC. Pt would benefit from skilled therapy to address deficits. Recommending SNF at d/c d/t current functional limitations. Treatment Diagnosis: impaired functional mobility  Specific instructions for Next Treatment: progress mobility as tolerated  Prognosis: Good  Decision Making: Medium Complexity  PT Education: Goals;PT Role;Plan of Care;Home Exercise Program;Transfer Training;Energy Conservation;General Safety;Gait Training;Disease Specific Education; Functional Mobility Training;Weight-bearing Education; Injury Prevention;Pressure Relief  Disease Specific Education: Pt educated on importance of OOB mobility, prevention of complications of bedrest, and general safety during hospitalization.  Pt verbalized understanding  Barriers to Learning: pain  REQUIRES PT FOLLOW UP: Yes  Activity Tolerance  Activity Tolerance: Patient limited by fatigue;Patient limited by pain  Activity Tolerance: /52, SpO2 90% on 2 L, HR 95       Patient Diagnosis(es): The primary encounter diagnosis was Left leg cellulitis. A diagnosis of Failure of outpatient treatment was also pertinent to this visit. has a past medical history of Atherosclerosis of native artery of right lower extremity with rest pain ([address] Back pain, Branch retinal vein occlusion, Bronchiectasis with acute exacerbation (HCC), Closed compression fracture of thoracic vertebra ([address] Closed fracture of facial bone with routine healing, Closed jaw fracture ([address] Community acquired pneumonia of left lower lobe of lung, Compression fracture of L1 lumbar vertebra (HCC), COPD (chronic obstructive pulmonary disease) ([address] Fracture of tibial plateau, closed, left, initial encounter, Minimally displaced zone I fracture of sacrum (HCC), Mucus plugging of bronchi, Osteomyelitis of mandible, Osteoporosis with pathological fracture, Post herpetic neuralgia, Proximal humerus fracture, Rheumatoid arthritis ([address] Shingles, Sleep apnea, Temporal arteritis ([address] Tobacco use, Tracheomalacia, and Vitreous hemorrhage, right eye ([address] has a past surgical history that includes hernia repair; Mandible fracture surgery; Foot surgery; Elbow surgery; Cataract removal; Tubal ligation; Knee arthroscopy; Kyphosis surgery; laminectomy; Spinal fusion; Septoplasty (05/07/2013); Artery surgery (05/30/2013); Upper gastrointestinal endoscopy (04/08/2014); bronchoscopy; bronchoscopy (N/A, 06/12/2019); Mandible fracture surgery (02/2020); back surgery (08/2020); other surgical history (01/08/2021); Pressure ulcer debridement (N/A, 01/08/2021); Artery Biopsy (Right, 03/01/2021); Coronary artery bypass graft (N/A, 05/03/2021); Mediastinoscopy (N/A, 05/05/2021); and Cardiac surgery (05/03/2021).     Restrictions  Restrictions/Precautions  Restrictions/Precautions: General Precautions, Contact Precautions (up with assist)  Position Activity Restriction  Other position/activity restrictions: 2L O2  Vision/Hearing  Vision: Impaired  Hearing: Within functional limits     Subjective  General  Chart Reviewed: Yes  Patient assessed for rehabilitation services?: Yes  Additional Pertinent Hx: s/p CABG 5/2/21; Stage IV sacral ulcer  Response To Previous Treatment: Not applicable  Family / Caregiver Present: No  Referring Practitioner: Sunil Krause MD  Referral Date : 07/12/21  Diagnosis: cellulitis and abscess of L leg  Follows Commands: Within Functional Limits  General Comment  Comments: Pt in bed upon approach. RN cleared Pt for therapy. Subjective  Subjective: Pt agreeable to therapy.   Pain Screening  Patient Currently in Pain: Yes  Pain Assessment  Pain Assessment: Faces  Tripathi-Baker Pain Rating: Hurts even more  Pain Type: Acute pain  Pain Location: Leg  Pain Orientation: Left  Non-Pharmaceutical Pain Intervention(s): Ambulation/Increased Activity;Repositioned;Relaxation techniques  Response to Pain Intervention: Patient Satisfied  Vital Signs  Patient Currently in Pain: Yes       Orientation  Orientation  Overall Orientation Status: Within Functional Limits  Social/Functional History  Social/Functional History  Lives With: Family (granddaughter)  Type of Home: House  Home Layout: One level  Home Access: Level entry  Bathroom Shower/Tub: Walk-in shower, Shower chair with back  Bathroom Toilet: Handicap height (BSC over toilet)  Bathroom Equipment: Grab bars in shower, Grab bars around toilet  Home Equipment: Rolling walker, Wheelchair-manual, Oxygen (sleeps in recliner)  ADL Assistance: Independent  Homemaking Assistance: Needs assistance (family does)  Homemaking Responsibilities: No  Ambulation Assistance: Independent (RW)  Transfer Assistance: Independent  Active : No  Occupation: Retired  Type of occupation: [employer]  Leisure & Hobbies: sit by pool    Objective  AROM RLE (degrees)  RLE AROM: WFL  AROM LLE (degrees)  LLE AROM : WFL  Strength RLE  Strength RLE: WFL  Strength LLE  Comment: unable to assess d/t high pain levels with movement and swelling d/t leg wound     Sensation  Overall Sensation Status: WFL    Bed mobility  Supine to Sit: Moderate assistance;2 Person assistance  Sit to Supine: Moderate assistance;2 Person assistance  Comment: increased time; assistance to move LLE d/t pain and swelling; cued for technique    Transfers  Sit to Stand: Moderate Assistance;2 Person Assistance  Stand to sit: Moderate Assistance;2 Person Assistance  Comment: with RW; limited WB through LLE d/t pain and swelling; cued for hand placement    Ambulation  Ambulation?: Yes  More Ambulation?: Yes  Ambulation 1  Surface: level tile  Device: Rolling Walker  Assistance: Moderate assistance;2 Person assistance  Quality of Gait: antaglic gait; decreased WB on LLE; fatigued quickly; difficulty initating step on LLE  Gait Deviations: Slow Lilia;Decreased step length;Decreased step height;Deviated path  Distance: 3 steps to <> BSC  Comments: assistance needed to negotiate walker              Plan   Plan  Times per week: 3-5x/week  Times per day: Daily  Specific instructions for Next Treatment: progress mobility as tolerated  Current Treatment Recommendations: Strengthening, ROM, Functional Mobility Training, Endurance Training, Transfer Training, Gait Training, Positioning, Pain Management  Safety Devices  Type of devices:  All fall risk precautions in place, Bed alarm in place, Call light within reach, Gait belt, Patient at risk for falls, Left in bed, Nurse notified  Restraints  Initially in place: No      AM-PAC Score     AM-PAC Inpatient Mobility without Stair Climbing Raw Score : 10 (07/12/21 1218)  AM-PAC Inpatient without Stair Climbing T-Scale Score : 34.07 (07/12/21 1218)  Mobility Inpatient CMS 0-100% Score: 71.66 (07/12/21 1218)  Mobility Inpatient without Stair CMS G-Code Modifier : CL (07/12/21 1218)       Goals  Short term goals  Time Frame for Short term goals: 1 week (7/19)  Short term goal 1: Pt will perform bed mobility with SBA  Short term goal 2: Pt will perfrom transfers with SBA  Short term goal 3: Pt will ambulate 50 ft with RW and SBA  Short term goal 4: 7/15: Pt will participate in 12-15 reps of BLE exercises to promote strengthening  Patient Goals   Patient goals : \"to go home\"       Therapy Time   Individual Concurrent Group Co-treatment   Time In 7598         Time Out 4897         Minutes 35         Timed Code Treatment Minutes: [address], [facility]    If pt is unable to be seen after this session, please let this note serve as discharge summary. Please see case management note for discharge disposition. Thank you.

## 2021-07-12 NOTE — CARE COORDINATION
VM received from [facility], wound care nurse. States per patient's daughter, they have a \"veriflow\" machine at home (supplied by [employer]) and they DO have supplies for vac as well.    3:32 PM  CM met at bedside w/patient and daughter, Samanta Block. Pt resides in home - grand daughter lives w/her. [facility] was coming to see patient once per week - nursing only, for wound care/dressing/vac changes. Plan is for patient to return home with resumption of home care. Per dtr, Jackelin Kaplan, provider has suggested pt may need a SECOND wound vac for leg wound. Cm will follow for provider recs and coordinate discharge arrangements.

## 2021-07-12 NOTE — PROGRESS NOTES
[facility] Progress Note    Consuelo Yin     : 1950    DATE OF VISIT:  2021    Subjective:     Consuelo Yin is a 72 y.o. female who has a pressure ulcer located on the sacrum. Current complaint of pain in this ulcer? yes. Quality of pain: aching and sharp  Timing: intermittent and stable  Severity: mild-moderate  Associated Signs/Symptoms:  drainage (moderate, serous to serosanguinous)  Other significant symptoms or pertinent ulcer history: seemed to do ok this past week after more aggressive excision of some of the deeper inflamed, fibrotic tissue in the center of that wound. Back with NPWT now, doing ok, just a bit of skin irritation. Doing well with offloading, trying to do her best to keep up with oral nutritional intake, vanita protein. Additional ulcer(s) noted? yes. This past week she noticed that what sounds like a previously stable small eschar or hemorrhagic crust on her left lower leg lysed away, leaving an open wound behind, and in the last couple of days she's seen increased LLE swelling, redness, had some drainage, and has had increased pain there; appetite is off a bit, perhaps mild nausea, perhaps a mild chill yesterday. No true rigors, no high-grade fever, no vomiting or diarrhea, no CP or SOB.     Ms. Lucas Schmitz has a past medical history of Atherosclerosis of native artery of right lower extremity with rest pain ([address] Back pain, Branch retinal vein occlusion, Bronchiectasis with acute exacerbation ([address] Closed compression fracture of thoracic vertebra ([address] Closed fracture of facial bone with routine healing, Closed jaw fracture ([address] Community acquired pneumonia of left lower lobe of lung, Compression fracture of L1 lumbar vertebra ([address] COPD (chronic obstructive pulmonary disease) ([address] Fracture of tibial plateau, closed, left, initial encounter, Minimally displaced zone I fracture of sacrum ([address] Mucus plugging of bronchi, Osteomyelitis of mandible, Osteoporosis with pathological fracture, Post herpetic neuralgia, Proximal humerus fracture, Rheumatoid arthritis ([address] Shingles, Sleep apnea, Temporal arteritis ([address] Tobacco use, Tracheomalacia, and Vitreous hemorrhage, right eye ([address] She has a past surgical history that includes hernia repair; Mandible fracture surgery; Foot surgery; Elbow surgery; Cataract removal; Tubal ligation; Knee arthroscopy; Kyphosis surgery; laminectomy; Spinal fusion; Septoplasty (05/07/2013); Artery surgery (05/30/2013); Upper gastrointestinal endoscopy (04/08/2014); bronchoscopy; bronchoscopy (N/A, 06/12/2019); Mandible fracture surgery (02/2020); back surgery (08/2020); other surgical history (01/08/2021); Pressure ulcer debridement (N/A, 01/08/2021); Artery Biopsy (Right, 03/01/2021); Coronary artery bypass graft (N/A, 05/03/2021); Mediastinoscopy (N/A, 05/05/2021); and Cardiac surgery (05/03/2021). Her family history includes Asthma in an other family member; Diabetes in her brother, mother, and sister; Heart Disease in her brother and brother; Hypertension in her mother. Ms. Josiah Kaufman reports that she quit smoking about 30 years ago. Her smoking use included cigarettes. She has a 20.00 pack-year smoking history. She has never used smokeless tobacco. She reports previous alcohol use. She reports that she does not use drugs. Her current medication list consists of Acapella, DULoxetine, Insulin Syringe-Needle U-100, Roflumilast, Teriparatide (Recombinant), albuterol sulfate HFA, aspirin EC, atorvastatin, azithromycin, blood glucose test strips, calcium carbonate, cetirizine, clopidogrel, cyclobenzaprine, docusate sodium, etanercept, fluticasone, gabapentin, insulin glargine, insulin lispro, ipratropium, latanoprost, meloxicam, metoprolol tartrate, metroNIDAZOLE, morphine, naloxone, omeprazole, oxyCODONE-acetaminophen, predniSONE, and vitamin D.  The azithromycin is chronic for her lung disease, the Flagyl is only topical, for sacral wound malodor with VAC, PRN. Allergies: Atenolol    Pertinent items from the review of systems are discussed in the HPI; the remainder of the ROS was reviewed and is negative. Objective:     Vitals:    07/07/21 0921 07/07/21 0934   BP:  (!) 98/53   Pulse:  81   Resp:  20   Temp:  99.1 °F (37.3 °C)   TempSrc:  Oral   Weight: 154 lb 9.6 oz (70.1 kg)    Height: 5' 10\" (1.778 m)      GUILLERMINA done today, because of the left leg wound -- right 1.02, left 2.22 (arm systolics 161 & 126, right ankle 118 & 120, left ankle 105 & 118). Constitutional:  well-developed, well-nourished, fatigued, NAD  Psychiatric:  oriented to person, place and time; mood and affect appropriate for the situation   Eyes:  pupils equal, round and reactive to light; sclerae anicteric, conjunctivae not pale  Cardiovascular:  bilateral pedal pulses palpable, feet warm, good cap refill; mild right but more moderate left lower extremity edema  Lymphatic:  no inguinal or popliteal adenopathy, no angitis, but a sizeable area of cellulitis along the left lower leg (pink-red, warm, indurated, tender), no fluctuant areas  Musculoskeletal:  no clubbing, cyanosis or petechiae; RLE and LLE with no gross effusions, joint misalignment or acute arthritis  Blanca-ulcer skin: indurated and pink at the sacrum; cellulitic at the left leg. Ulcer(s): sacral ulcer mostly pink-red and granular, a bit of fibrotic tissue remaining including fascial tissue, but much less than last week, no exposed bone, no pus, some fibrin and biofilm, still some undermining mostly toward her left side; leg ulcer full thickness, mostly some fibronecrotic SQ tissue, minimal undermining, some fibrin, slightly cloudy exudate, no obvious true pus. Photos also saved in electronic chart.     Today's Wound Measurements, per RN documentation:  Wound 07/07/21 #3, left medial leg, dehisced surgical, full thickness, onset 5/2021-Wound Length (cm): 2.5 cm  Wound 12/18/20 #2, Sacrum, Pressure Injury, Stage 4, Onset 5/2020-Wound Length (cm): 4.5 cm    Wound 07/07/21 #3, left medial leg, dehisced surgical, full thickness, onset 5/2021-Wound Width (cm): 1.5 cm  Wound 12/18/20 #2, Sacrum, Pressure Injury, Stage 4, Onset 5/2020-Wound Width (cm): 2 cm    Wound 07/07/21 #3, left medial leg, dehisced surgical, full thickness, onset 5/2021-Wound Depth (cm): 0.5 cm  Wound 12/18/20 #2, Sacrum, Pressure Injury, Stage 4, Onset 5/2020-Wound Depth (cm): 2 cm  _____________________________    Lab Results   Component Value Date    LABALBU 3.0 (L) 07/12/2021     Lab Results   Component Value Date    CREATININE 0.8 07/12/2021     Lab Results   Component Value Date    HGB 8.2 (L) 07/12/2021     Lab Results   Component Value Date    LABA1C 8.1 05/03/2021       No pertinent positive micro studies from the recent past.    Assessment:     Patient Active Problem List   Diagnosis Code    Rheumatoid arthritis ([facility] M06.9    Psoriasis L40.9    GERD (gastroesophageal reflux disease) K21.9    History of tobacco use Z87.891    Hyponatremia E87.1    Anemia D64.9    Cylindrical bronchiectasis (HCC) J47.9    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    Immunocompromised state ([facility] [address] D84.9    Hypokalemia E87.6    Coronary artery disease I25.10    Stasis edema of both lower extremities I87.303    Essential hypertension I10    Lumbar spondylosis M47.816    Mitral valve insufficiency and aortic valve insufficiency I08.0    Mixed hyperlipidemia E78.2    Myopia of both eyes H52.13    Osteoporosis M81.0    Other chronic sinusitis J32.8    Primary open angle glaucoma (POAG) of both eyes, mild stage H40.1131    Primary osteoarthritis of right hip M16.11    Type 2 diabetes mellitus with unspecified diabetic retinopathy without macular edema (HCC) E11.319    Uncontrolled type 2 diabetes mellitus with diabetic peripheral angiopathy without gangrene, with long-term current use of insulin (HCC) E11.51, E11.65, Z79.4    the removal of subcutaneous tissue. The type(s) of tissue debrided included fibrin, biofilm and necrotic/eschar. Total Surface Area Debrided: 3 sq cm. The ulcers were then irrigated with normal saline solution. The procedure was completed with a small amount of bleeding, and hemostasis was with pressure. The patient tolerated the procedure well, with no significant complications. The patient's level of pain during and after the procedure was monitored. Post-debridement measurements, if different from pre-debridement, are in the flowsheet as well. Discharge plan:     Treatment in the wound care center today, per RN documentation: Wound 07/07/21 #3, left medial leg, dehisced surgical, full thickness, onset 5/2021-Dressing/Treatment:  (triad, gauze, mepilex border - spandigrip)  Wound 12/18/20 #2, Sacrum, Pressure Injury, Stage 4, Onset 5/2020-Dressing/Treatment: Other (comment) (hydrocolloid flagyl collagen black foam NPWT). I took a wound culture from the left leg today, after debridement. Empiric Bactrim + Levaquin (should cover Staph aureus, beta-hemolytic Strep, some GNRs including Pseudomonas). Be sure to start today. I'll call her in 48-72 hours with culture results, if we need to modify therapy. Between now and next week, if she starts to become more sick (severe pain, worsening redness, high fever, rigors, SOB, vomiting, etc), do not hesitate to call me, or report to the ER if needed. Periodic leg elevation this week to help with leg edema. Keep working on offloading, ROHO cushion use, diabetic diet, protein intake. I'll send a quick update to Dr. Karn Landau. Home treatment: good handwashing before and after any dressing changes. Cleanse ulcer with saline or soap & water before dressing change. May use Vaseline (petrolatum), Aquaphor, Aveeno, CeraVe, Cetaphil, Eucerin, Lubriderm, etc for dry skin.      Dressing type for home: hypochlorous acid, Triad, dry dressing and Spandagrip to the left lower leg once daily; hypochlorous acid, Flagyl powder, collagen, NPWT to the sacrum, three times weekly. Written discharge instructions given to patient. Follow up in 1 week.     Electronically signed by Gaston Orantes MD on 7/12/2021 at 2:56 PM.

## 2021-07-12 NOTE — PROGRESS NOTES
Comprehensive Nutrition Assessment    Type and Reason for Visit:  Initial, Positive Nutrition Screen    Nutrition Recommendations/Plan:   1. Modify current diet to [facility] and encourage PO intake  2. FR per MD  3. RD to add glucerna TID   4. Recommend bowel regimen if medically appropriate  5. Monitor nutrition adequacy, pertinent labs, bowel habits, wt changes, and clinical progress    Nutrition Assessment:  Positive nutrition screen for wt loss, poor PO intake, wounds and difficulty swallowin y.o female admitted to be evaluated for acute worsening of LLE postoperative incision. Patient also has a stage IV sacral decubitus ulceration. Plan to reapply wound vac to sacrum today, per CT surgery note. Currently on CC3 diet and 1800 ml fluid restriction. RD to modify diet to CC4 to increase PO intake. RD familiar with pt. Pt reports no appetite today, consumed 25-50% of breakfast this morning per RN. Pt reports good PO intake PTA, often consuming 2 meals per day. RD to add glucerna TID with meals per pt request. Encouraged pt to consume high protein foods to assist in wound healing. Pt has noticed some wt loss, per EMR pt was 167 lb in May, currently 152 lb (-9% weight change x 2 months). Reports some nausea, no emesis. Reports difficulty swallowing d/t fragile jaw and no teeth. Pt independently chooses softer foods. Will continue to monitor.     Malnutrition Assessment:  Malnutrition Status:  Mild malnutrition    Context:  Acute Illness     Findings of the 6 clinical characteristics of malnutrition:  Energy Intake:  Mild decrease in energy intake (Comment)  Weight Loss:  1 - 5% over 1 month     Body Fat Loss:  1 - Mild body fat loss     Muscle Mass Loss:  1 - Mild muscle mass loss    Fluid Accumulation:  No significant fluid accumulation      Estimated Daily Nutrient Needs:  Energy (kcal):  6560-4741 kcals/day; Weight Used for Energy Requirements:  Current (69 kg)     Protein (g):   g/day; Weight Used for Protein Requirements:  Current (1.3-1.6 g/kg)        Fluid (ml/day):  1800 ml; Method Used for Fluid Requirements:   (Fluid restriction)      Nutrition Related Findings:  Sodium low 132 mmol/L and K+ low 3.3 mmol/L. BG [id number] since admission, monitoring with insulin. + BM saturday, recommend bowel regimen if appropriate. Vitamin D supplement. Wounds:  Pressure Injury, Stage IV, Surgical Incision (Worsening LLE incision and stage IV sacral decubitus ulceration)       Current Nutrition Therapies:    ADULT DIET; Regular; 3 carb choices (45 gm/meal); 1800 ml    Anthropometric Measures:  · Height: 5' 8\" (172.7 cm)  · Current Body Weight: 152 lb (68.9 kg)     · Ideal Body Weight: 140 lbs; % Ideal Body Weight 108.6 %   · BMI: 23.1    · BMI Categories: Normal Weight (BMI 18.5-24. 9)       Nutrition Diagnosis:   · Increased nutrient needs related to increase demand for energy/nutrients as evidenced by intake 0-25%, intake 26-50%, wounds, weight loss greater than or equal to 5% in 1 month, mild loss of subcutaneous fat, mild muscle loss    Nutrition Interventions:   Food and/or Nutrient Delivery:  Modify Current Diet, Start Oral Nutrition Supplement  Nutrition Education/Counseling:  Education not indicated   Coordination of Nutrition Care:  Continue to monitor while inpatient    Goals:  Consume 50% or greater of 3 meals per day and ONS       Nutrition Monitoring and Evaluation:   Behavioral-Environmental Outcomes:  None Identified   Food/Nutrient Intake Outcomes:  Food and Nutrient Intake, Supplement Intake  Physical Signs/Symptoms Outcomes:  Biochemical Data, Chewing or Swallowing, Constipation, Nutrition Focused Physical Findings, Weight, Skin     Discharge Planning:    Continue current diet, Continue Oral Nutrition Supplement     Electronically signed by Norleen Dubin, MS, RD, LD on 7/12/21 at 11:41 AM EDT    Contact: Office: [phone]; [address]: [id number]

## 2021-07-12 NOTE — DISCHARGE INSTR - COC
Continuity of Care Form    Patient Name: Dawood Barrett   :  1950  MRN:  [id number]    Admit date:  2021  Discharge date:  07/15/21     Code Status Order: Full Code   Advance Directives:      Admitting Physician:  Fabio Guerrero MD  PCP: Aminah Heaton MD    Discharging Nurse: [facility]/Room#: 7862/8381-15  Discharging Unit Phone Number: ***    Emergency Contact:   Extended Emergency Contact Information  Primary Emergency Contact: [address] of [address] Phone: [phone]  Mobile Phone: [phone]  Relation: Child  Secondary Emergency Contact[facility] [address] [facility] Phone: [phone]  Mobile Phone: [phone]  Relation: Child    Past Surgical History:  Past Surgical History:   Procedure Laterality Date    ARTERY BIOPSY Right 2021    at [address]  2013    left temporal artery biopsy    BACK SURGERY  2020    BRONCHOSCOPY      BRONCHOSCOPY N/A 2019    BRONCHOSCOPY ALVEOLAR LAVAGE performed by Derrek Gloria MD at [place] [address]  2021    CATARACT REMOVAL      CORONARY ARTERY BYPASS GRAFT N/A 2021    CORONARY ARTERY BYPASS X3 WITH LEFT ATRIAL APPENDAGE CLIP, 5 LEVEL BILATERAL INTERCOSTAL NERVE BLOCK, STERNAL PLATING performed by Malik Lozano MD at [address] ARTHROSCOPY      left    KYPHOSIS SURGERY      [address]AMINE[address]OMY     [id number]  2020    MEDIASTINOSCOPY N/A 2021    MEDIASTINAL EXPLORATION AND EVACUATION OF HEMATOMA performed by Malik Lozano MD at [address]  2021    sacral wound debridement    PRESSURE ULCER DEBRIDEMENT N/A 2021    SACRAL WOUND DEBRIDEMENT performed by Sam Sifuentes MD at [facility] [address] SEPTOPLASTY  2013    FESS with balloon    Chronic diastolic congestive heart failure (HCC) I50.32    Surgical wound dehiscence, initial encounter (at [facility]) T81.31XA       Isolation/Infection:   Isolation            Contact          Patient Infection Status       Infection Onset Added Last Indicated Last Indicated By Review Planned Expiration Resolved Resolved By    MRSA 07/07/21 07/09/21 07/07/21 Culture, Wound        Resolved    COVID-19 Rule Out 04/23/21 04/23/21 04/23/21 COVID-19, Rapid (Ordered)   04/23/21 Rule-Out Test Resulted    COVID-19 Rule Out 01/04/21 01/04/21 01/04/21 COVID-19 (Ordered)   01/05/21 Rule-Out Test Resulted    COVID-19 Rule Out 06/28/20 06/28/20 06/28/20 COVID-19 (Ordered)   06/29/20 Rule-Out Test Resulted            Nurse Assessment:  Last Vital Signs: BP (!) 101/54   Pulse 88   Temp 99.5 °F (37.5 °C) (Oral)   Resp 16   Ht 5' 8\" (1.727 m)   Wt 152 lb 12.8 oz (69.3 kg)   SpO2 97%   BMI 23.23 kg/m²     Last documented pain score (0-10 scale): Pain Level: 8  Last Weight:   Wt Readings from Last 1 Encounters:   07/12/21 152 lb 12.8 oz (69.3 kg)     Mental Status:  {IP PT MENTAL STATUS:20030}    IV Access:  {[facility] IV ACCESS:812102879}    Nursing Mobility/ADLs:  Walking   {[facility] DME HZKO:623456563}  Transfer  {[facility] DME NCXZ:058984167}  Bathing  {[facility] DME RSGS:289389729}  Dressing  {[facility] DME UIRQ:929871700}  Toileting  {[facility] DME CEUJ:317658430}  Feeding  {[facility] DME LAFY:760976265}  Med Admin  {[facility] DME RFNE:803423782}  Med Delivery   {[facility] MED Delivery:150075907}    Wound Care Documentation and Therapy:     Negative Pressure Wound Therapy Sacrum Mid (Active)   $ Standard NPWT >50 sq cm PER TX $ Yes 07/14/21 1357   Wound Type Pressure ulcer: Stage IV;Surgical 07/14/21 1357   Unit Type KCI VAC VFVR [id number] 07/14/21 1357   Dressing Type Black foam 07/14/21 1945   Number of pieces used 2 07/14/21 1357   Cycle Continuous 07/14/21 1357   Target Pressure (mmHg) 125 07/14/21 1945   Intensity 1 07/14/21 1357   Irrigation Solution Sodium chloride 0.9% 07/14/21 1357   Instillation Volume  20 mL 07/14/21 1357   Soak Time  10 minutes 07/14/21 1357   Vac Frequency 3 hours 07/14/21 1357   Canister changed? Yes 07/14/21 1824   Dressing Status Clean;Dry; Intact 07/14/21 1945   Dressing Changed Changed/New 07/14/21 1357   Drainage Amount Small 07/14/21 1357   Drainage Description Serosanguinous 07/14/21 1357   Dressing Change Due 07/16/21 07/14/21 1357   Output (ml) 0 ml 07/14/21 1616   Wound Assessment Granulation tissue; Red 07/14/21 1357   Blanca-wound Assessment Clean;Dry; Intact 07/14/21 1357   Shape round 07/12/21 1256   Odor None 07/14/21 1357   Number of days: 50       Wound 12/18/20 #2, Sacrum, Pressure Injury, Stage 4, Onset 5/2020 (Active)   Wound Image   07/12/21 1256   Wound Etiology Pressure Stage  4 07/14/21 1358   Dressing Status Clean;Dry; Intact 07/14/21 1945   Wound Cleansed Cleansed with saline 07/14/21 1358   Dressing/Treatment Barrier film;Hydrocolloid; Negative pressure wound therapy 07/14/21 1945   Offloading for Diabetic Foot Ulcers Other (comment) 07/13/21 0820   Dressing Change Due 07/16/21 07/14/21 1358   Wound Length (cm) 4 cm 07/14/21 1358   Wound Width (cm) 2 cm 07/14/21 1358   Wound Depth (cm) 0.5 cm 07/14/21 1358   Wound Surface Area (cm^2) 8 cm^2 07/14/21 1358   Change in Wound Size % (l*w) -5233.33 07/14/21 1358   Wound Volume (cm^3) 4 cm^3 07/14/21 1358   Wound Healing % -4344 07/14/21 1358   Post-Procedure Length (cm) 4.5 cm 07/07/21 1021   Post-Procedure Width (cm) 2 cm 07/07/21 1021   Post-Procedure Depth (cm) 2 cm 07/07/21 1021   Post-Procedure Surface Area (cm^2) 9 cm^2 07/07/21 1021   Post-Procedure Volume (cm^3) 18 cm^3 07/07/21 1021   Distance Tunneling (cm) 3.5 cm 07/07/21 0921   Tunneling Position ___ O'Clock 1 07/07/21 0921   Undermining Starts ___ O'Clock 12 07/12/21 1256   Undermining Ends___ O'Clock 12 07/12/21 1256   Undermining Maxium Distance (cm) 3.5 07/12/21 1256   Wound Assessment Pink/red;Granulation tissue 07/14/21 1358   Drainage Amount Small 07/14/21 1358   Drainage Description Serosanguinous 07/14/21 1358   Odor None 07/14/21 1358   Blanca-wound Assessment Dry/flaky 07/14/21 1358   Margins Defined edges 07/14/21 1358   Wound Thickness Description not for Pressure Injury Full thickness 07/12/21 1256   Number of days: 209       Wound 07/07/21 #3, left medial leg, dehisced surgical, full thickness, onset 5/2021 (Active)   Wound Image    07/12/21 1256   Wound Etiology Non-Healing Surgical 07/12/21 1256   Dressing Status Clean;Dry; Intact 07/14/21 1945   Wound Cleansed Cleansed with saline 07/14/21 1041   Dressing/Treatment Moist to moist;Dry dressing;Roll gauze 07/14/21 1041   Offloading for Diabetic Foot Ulcers Other (comment) 07/14/21 1041   Dressing Change Due 07/15/21 07/14/21 1041   Wound Length (cm) 7 cm 07/12/21 1256   Wound Width (cm) 6 cm 07/12/21 1256   Wound Depth (cm) 0.1 cm 07/12/21 1256   Wound Surface Area (cm^2) 42 cm^2 07/12/21 1256   Change in Wound Size % (l*w) -1020 07/12/21 1256   Wound Volume (cm^3) 4.2 cm^3 07/12/21 1256   Wound Healing % -124 07/12/21 1256   Post-Procedure Length (cm) 2.5 cm 07/07/21 1021   Post-Procedure Width (cm) 1.5 cm 07/07/21 1021   Post-Procedure Depth (cm) 0.5 cm 07/07/21 1021   Post-Procedure Surface Area (cm^2) 3.75 cm^2 07/07/21 1021   Post-Procedure Volume (cm^3) 1.875 cm^3 07/07/21 1021   Distance Tunneling (cm) 0 cm 07/12/21 1256   Tunneling Position ___ O'Clock 0 07/12/21 1256   Undermining Starts ___ O'Clock 0 07/12/21 1256   Undermining Ends___ O'Clock 0 07/12/21 1256   Undermining Maxium Distance (cm) 0 07/12/21 1256   Wound Assessment Pink/red;Slough 07/14/21 1041   Drainage Amount Moderate 07/14/21 1041   Drainage Description Serosanguinous 07/14/21 1041   Odor None 07/14/21 1041   Blanca-wound Assessment Blanchable erythema;Edematous; Induration; Fluctulant 07/14/21 1041   Margins Attached edges; Defined edges 07/14/21 1041   Number of days: 8       Wound 07/12/21 Knee Left; Inner purple (Active)   Wound Image   07/12/21 1955   Wound Etiology Deep tissue/Injury 07/12/21 1259   Dressing Status Clean;Dry; Intact 07/14/21 1945   Wound Cleansed Not Cleansed 07/14/21 1041   Dressing/Treatment Pharmaceutical agent (see MAR) 07/14/21 1041   Offloading for Diabetic Foot Ulcers Other (comment) 07/12/21 1259   Wound Length (cm) 0.2 cm 07/12/21 1259   Wound Width (cm) 6 cm 07/12/21 1259   Wound Depth (cm) 0 cm 07/12/21 1259   Wound Surface Area (cm^2) 1.2 cm^2 07/12/21 1259   Wound Volume (cm^3) 0 cm^3 07/12/21 1259   Distance Tunneling (cm) 0 cm 07/12/21 1259   Tunneling Position ___ O'Clock 0 07/12/21 1259   Undermining Starts ___ O'Clock 0 07/12/21 1259   Undermining Ends___ O'Clock 0 07/12/21 1259   Undermining Maxium Distance (cm) 0 07/12/21 1259   Wound Assessment Purple/maroon 07/12/21 1259   Drainage Amount None 07/12/21 1259   Odor None 07/12/21 1259   Blanca-wound Assessment Edematous; Blanchable erythema 07/12/21 1259   Margins Attached edges; Defined edges 07/12/21 1259   Number of days: 3       Wound 07/12/21 Ankle Left; Inner purple (Active)   Wound Image   07/12/21 1259   Wound Etiology Deep tissue/Injury 07/12/21 1259   Dressing Status Clean;Dry; Intact 07/14/21 1945   Wound Cleansed Cleansed with saline 07/12/21 1259   Dressing/Treatment Pharmaceutical agent (see MAR) 07/14/21 1041   Offloading for Diabetic Foot Ulcers Other (comment) 07/12/21 1259   Wound Length (cm) 0.2 cm 07/12/21 1259   Wound Width (cm) 2 cm 07/12/21 1259   Wound Depth (cm) 0 cm 07/12/21 1259   Wound Surface Area (cm^2) 0.4 cm^2 07/12/21 1259   Wound Volume (cm^3) 0 cm^3 07/12/21 1259   Distance Tunneling (cm) 0 cm 07/12/21 1259   Tunneling Position ___ O'Clock 0 07/12/21 1259   Undermining Starts ___ O'Clock 0 07/12/21 1259   Undermining Ends___ O'Clock 0 07/12/21 1259   Undermining Maxium Distance (cm) 0 07/12/21 1259   Wound Assessment Purple/maroon 07/12/21 1259   Drainage Amount None 07/12/21 1259   Odor None 07/12/21 1259   Blanca-wound Assessment Blanchable erythema;Edematous 07/12/21 1259   Margins Attached edges; Defined edges 07/12/21 1259   Number of days: 3     Incision 07/13/21 Leg Left (Active)   Wound Image   07/15/21 1444   Dressing Status New dressing applied 07/15/21 1444   Dressing Change Due 07/16/21 07/15/21 1444   Incision Cleansed Cleansed with saline 07/15/21 1444   Dressing/Treatment Moist to moist;Dry dressing;Roll gauze; Ace wrap 07/15/21 1444   Incision Length (cm) 9 undermining 1000  pm-600pm at 2 cm 07/15/21 1444   Incision Width (cm) 3 cm 07/15/21 1444   Incision Depth (cm) 2 cm 07/15/21 1444   Margins Other (Comment) 07/15/21 1444   Incision Assessment Other (Comment) 07/15/21 1444   Drainage Amount Small 07/15/21 1444   Drainage Description Serosanguinous 07/15/21 1444   Odor None 07/15/21 1444   Blanca-incision Assessment Dry/flaky; Warm;Other (Comment) 07/15/21 1444   Number of days: 2     Left  lower leg open incision:               Elimination:  Continence:   · Bowel: {YES / CP:12185}  · Bladder: {YES / KW:58256}  Urinary Catheter: {Urinary Catheter:651854483}   Colostomy/Ileostomy/Ileal Conduit: {YES / CN:49902}       Date of Last BM: ***    Intake/Output Summary (Last 24 hours) at 7/12/2021 1538  Last data filed at 7/12/2021 1110  Gross per 24 hour   Intake 479.86 ml   Output 550 ml   Net -70.14 ml     I/O last 3 completed shifts: In: 479.9 [P.O.:240; I.V.:27.5;  IV Piggyback:212.4]  Out: 550 [Urine:550]    Safety Concerns:     [employer] Safety Concerns:371763609}    Impairments/Disabilities:      {[facility] Impairments/Disabilities:091125858}    Nutrition Therapy:  Current Nutrition Therapy:   [employer] Diet List:310144270}    Routes of Feeding: {[facility] DME Other Feedings:535989382}  Liquids: {Slp liquid thickness:68094}  Daily Fluid Restriction: {[facility] DME Yes amt example:381657077}  Last Modified Barium Swallow with Video (Video Swallowing Test): {Done Not Done PJJE:903122924}    Treatments at the Time of Hospital Discharge:   Respiratory Treatments: ***  Oxygen Therapy:  {Therapy; copd oxygen:00180}  Ventilator:    {MH CC Vent JJCH:443120686}    Rehab Therapies: {THERAPEUTIC INTERVENTION:6760209986}  Weight Bearing Status/Restrictions: [address] CC Weight Bearin}  Other Medical Equipment (for information only, NOT a DME order):  {EQUIPMENT:634703095}  Other Treatments: ***    Patient's personal belongings (please select all that are sent with patient):  {[facility] DME Belongings:359858987}    RN SIGNATURE:  {Esignature:518634747}    CASE MANAGEMENT/SOCIAL WORK SECTION    Inpatient Status Date: 21    Readmission Risk Assessment Score:  Readmission Risk              Risk of Unplanned Readmission:  26         Discharging to Facility/ Agency   · Name: [address] [place] [address]   · Address:  · Phone: [phone]  · Fax: [phone]    / signature: Electronically signed by May Demarco RN on 7/15/21 at 3:52 PM EDT    PHYSICIAN SECTION    Prognosis: Good    Condition at Discharge: Stable    Rehab Potential (if transferring to Rehab): Good    Recommended Labs or Other Treatments After Discharge:   Negative pressure wound therapy to sacral wound and left lower inner leg incision. Connect to 125 mmHg low continuous suction. Patient has a Medela machine in the home and it is supplied by [facility] [employer] [facility]. Change dressings 3 times a week. Sacral wound follow up with [facility], left leg wound follow up with Dr Jonelle Trinidad. Physician Certification: I certify the above information and transfer of Georgeann Nageotte  is necessary for the continuing treatment of the diagnosis listed and that she requires [address] for less 30 days.      Update Admission H&P: No change in H&P    PHYSICIAN SIGNATURE:  Electronically signed by Moses Zamora MD on 7/15/21 at 3:19 PM EDT

## 2021-07-12 NOTE — CONSULTS
[facility]  [phone]      Attending Physician: Dena Dunham MD  Reason for Consultation/Chief Complaint: Leg edema    Subjective   History of Present Illness:  Irineo Jones is a 72 y.o. patient who presented to the hospital with complaints of leg edema and swelling. This is mostly on her left side she noticed over the last 2 days a significant increase amounts of edema and swelling as well as erythema and drainage out of this left leg. She normally follows with Dr. Marita Rogers at the wound clinic for sacral ulcer when she was noticed to have this lesion and antibiotics apparently called in for outpatient use but this was not improving and she came to the [facility] for evaluation. She specifically denies any chest pain, breathing, shortness of breath. States most of her edema is all slowly focused to the left leg and otherwise been feeling well following surgery          Past Medical History:   has a past medical history of Atherosclerosis of native artery of right lower extremity with rest pain ([address] Back pain, Branch retinal vein occlusion, Bronchiectasis with acute exacerbation (HCC), Closed compression fracture of thoracic vertebra ([address] Closed fracture of facial bone with routine healing, Closed jaw fracture ([address] Community acquired pneumonia of left lower lobe of lung, Compression fracture of L1 lumbar vertebra (HCC), COPD (chronic obstructive pulmonary disease) ([address] Fracture of tibial plateau, closed, left, initial encounter, Minimally displaced zone I fracture of sacrum ([address] Mucus plugging of bronchi, Osteomyelitis of mandible, Osteoporosis with pathological fracture, Post herpetic neuralgia, Proximal humerus fracture, Rheumatoid arthritis ([address] Shingles, Sleep apnea, Temporal arteritis ([address] Tobacco use, Tracheomalacia, and Vitreous hemorrhage, right eye ([address]     Surgical History:   has a past surgical history that includes hernia repair; Mandible fracture surgery; Foot surgery; Elbow surgery; Cataract removal; Tubal ligation; Knee arthroscopy; Kyphosis surgery; laminectomy; Spinal fusion; Septoplasty (05/07/2013); Artery surgery (05/30/2013); Upper gastrointestinal endoscopy (04/08/2014); bronchoscopy; bronchoscopy (N/A, 06/12/2019); Mandible fracture surgery (02/2020); back surgery (08/2020); other surgical history (01/08/2021); Pressure ulcer debridement (N/A, 01/08/2021); Artery Biopsy (Right, 03/01/2021); Coronary artery bypass graft (N/A, 05/03/2021); Mediastinoscopy (N/A, 05/05/2021); and Cardiac surgery (05/03/2021). Social History:   reports that she quit smoking about 30 years ago. Her smoking use included cigarettes. She has a 20.00 pack-year smoking history. She has never used smokeless tobacco. She reports previous alcohol use. She reports that she does not use drugs. Family History:  family history includes Asthma in an other family member; Diabetes in her brother, mother, and sister; Heart Disease in her brother and brother; Hypertension in her mother. Home Medications:  Were reviewed and are listed in nursing record and/or below  Prior to Admission medications    Medication Sig Start Date End Date Taking? Authorizing Provider   doxycycline hyclate (VIBRA-TABS) 100 MG tablet Take 1 tablet by mouth 2 times daily for 7 days -- continue your levofloxacin, add this antibiotic, but STOP the Bactrim (trimethoprpm-sulfamethoxazole).  7/9/21 7/16/21 Yes Jasen Mays MD   levoFLOXacin (LEVAQUIN) 500 MG tablet Take 1 tablet by mouth daily for 7 days 7/7/21 7/14/21 Yes Jasen Mays MD   azithromycin (ZITHROMAX) 250 MG tablet TAKE 1 TABLET BY MOUTH EVERY DAY 7/6/21  Yes Zay Clark MD   DALIRESP 500 MCG tablet TAKE 1 TABLET BY MOUTH DAILY 6/23/21  Yes Zay Clark MD   clopidogrel (PLAVIX) 75 MG tablet Take 1 tablet by mouth daily 6/10/21  Yes Jono Tubbs MD   metoprolol tartrate (LOPRESSOR) 25 MG tablet Take 0.5 tablets by mouth 2 times daily Hold this medication for pulse <08 or systolic BP <519 8/00/09  Yes Sony Goodwin MD   metroNIDAZOLE (FLAGYL) 250 MG tablet Crush one tablet into a fine powder, sprinkle into wound TIW as needed for malodor, at time of dressing change. 6/2/21  Yes Kassi Lancaster MD   predniSONE (DELTASONE) 1 MG tablet Take 4 mg by mouth daily   Yes Historical Provider, MD   insulin glargine (LANTUS) 100 UNIT/ML injection vial Inject 15 Units into the skin nightly 5/10/21  Yes LORENZO Charles - CNP   oxyCODONE-acetaminophen (PERCOCET)  MG per tablet Take 1 tablet by mouth daily. Yes Historical Provider, MD   docusate sodium (COLACE) 100 MG capsule Take 100 mg by mouth 2 times daily as needed for Constipation   Yes Historical Provider, MD   DULoxetine (CYMBALTA) 60 MG extended release capsule Take 60 mg by mouth daily   Yes Historical Provider, MD   gabapentin (NEURONTIN) 300 MG capsule Take 300 mg by mouth 2 times daily. Yes Historical Provider, MD   latanoprost (XALATAN) 0.005 % ophthalmic solution Place 1 drop into both eyes nightly   Yes Historical Provider, MD   Teriparatide, Recombinant, (FORTEO) 600 MCG/2.4ML SOPN injection Inject 20 mcg into the skin daily   Yes Historical Provider, MD   NARCAN 4 MG/0.1ML LIQD nasal spray PLEASE SEE ATTACHED FOR DETAILED DIRECTIONS 10/20/20  Yes Historical Provider, MD   morphine (MS CONTIN) 15 MG extended release tablet 15 mg 2 times daily.   10/16/20  Yes Historical Provider, MD   ipratropium (ATROVENT) 0.06 % nasal spray USE 2 SPRAYS BY NASAL ROUTE 2-4 TIMES DAILY 10/29/20  Yes Luzmaria Howard MD   albuterol sulfate  (90 Base) MCG/ACT inhaler INHALE 2 PUFFS INTO THE LUNGS EVERY 4 HOURS AS NEEDED FOR WHEEZING 1/20/20  Yes Kenia Harrell MD   vitamin D (CHOLECALCIFEROL) 1000 UNIT TABS tablet Take 5,000 Units by mouth daily    Yes Historical Provider, MD   calcium carbonate (OSCAL) 500 MG TABS tablet Take 500 mg by mouth daily   Yes Historical Provider, MD   atorvastatin (LIPITOR) 40 MG tablet Take 40 mg by mouth 10/16/18  Yes Historical Provider, MD   cyclobenzaprine (FLEXERIL) 10 MG tablet Take 10 mg by mouth 2 times daily as needed    Yes Historical Provider, MD   meloxicam (MOBIC) 15 MG tablet Patient states taking only as needed 4/7/18  Yes Historical Provider, MD   aspirin EC 81 MG EC tablet Take 1 tablet by mouth daily 10/23/17  Yes Macie Flores MD   insulin lispro (HUMALOG) 100 UNIT/ML injection vial Inject 0-12 Units into the skin 3 times daily (with meals) 10/23/17  Yes Macie Flores MD   fluticasone (FLONASE) 50 MCG/ACT nasal spray INHALE 2 SPRAYS IN EACH NOSTRIL DAILY 11/25/13  Yes Peggy Hatch MD   cetirizine (ZYRTEC) 10 MG tablet Take 10 mg by mouth daily. Yes Historical Provider, MD   etanercept (ENBREL) 50 MG/ML injection Inject 50 mg into the skin every 7 days. Yes Historical Provider, MD   omeprazole (PRILOSEC) 40 MG capsule Take 40 mg by mouth daily    Yes Historical Provider, MD   Insulin Syringe-Needle U-100 31G X 5/16\" 0.5 ML MISC USE 5 TIMES DAILY 5/30/18   Historical Provider, MD   [address] [place] [address] strip TEST 4 TIMES DAILY 6/1/18   Historical Provider, MD   Misc.  Devices (ACAPELLA) MISC Take 1 Device by mouth as needed 9/2/15   Naida Bailey, APRN - CNP        CURRENT Medications:  Lip Balm ointment, PRN  atorvastatin (LIPITOR) tablet 40 mg, Nightly  cetirizine (ZYRTEC) tablet 10 mg, Daily  clopidogrel (PLAVIX) tablet 75 mg, Daily  Roflumilast (DALIRESP) tablet 500 mcg, Daily  docusate sodium (COLACE) capsule 100 mg, BID PRN  DULoxetine (CYMBALTA) extended release capsule 60 mg, Daily  fluticasone (FLONASE) 50 MCG/ACT nasal spray 1 spray, Daily  gabapentin (NEURONTIN) capsule 300 mg, BID  ipratropium (ATROVENT) 0.06 % nasal spray 2 spray, 4x Daily  latanoprost (XALATAN) 0.005 % ophthalmic solution 1 drop, Nightly  metoprolol tartrate (LOPRESSOR) tablet 12.5 mg, BID  morphine (MS CONTIN) extended release tablet 15 mg, BID  teriparatide (FORTEO) injection 20 mcg, Daily  vitamin D (CHOLECALCIFEROL) tablet 5,000 Units, Daily  predniSONE (DELTASONE) tablet 4 mg, Daily  pantoprazole (PROTONIX) tablet 40 mg, QAM AC  cyclobenzaprine (FLEXERIL) tablet 10 mg, BID PRN  calcium elemental (OSCAL) tablet 500 mg, Daily  aspirin EC tablet 81 mg, Daily  albuterol (PROVENTIL) nebulizer solution 2.5 mg, Q6H PRN  insulin lispro (HUMALOG) injection vial 0-12 Units, TID WC  insulin lispro (HUMALOG) injection vial 0-6 Units, Nightly  glucose (GLUTOSE) 40 % oral gel 15 g, PRN  dextrose 50 % IV solution, PRN  glucagon (rDNA) injection 1 mg, PRN  dextrose 5 % solution, PRN  sodium chloride flush 0.9 % injection 10 mL, 2 times per day  sodium chloride flush 0.9 % injection 10 mL, PRN  0.9 % sodium chloride infusion, PRN  potassium chloride (KLOR-CON M) extended release tablet 40 mEq, PRN   Or  potassium bicarb-citric acid (EFFER-K) effervescent tablet 40 mEq, PRN   Or  potassium chloride 10 mEq/100 mL IVPB (Peripheral Line), PRN  magnesium sulfate 2000 mg in 50 mL IVPB premix, PRN  enoxaparin (LOVENOX) injection 40 mg, Daily  promethazine (PHENERGAN) tablet 12.5 mg, Q6H PRN   Or  ondansetron (ZOFRAN) injection 4 mg, Q6H PRN  senna (SENOKOT) tablet 8.6 mg, Daily PRN  acetaminophen (TYLENOL) tablet 650 mg, Q6H PRN   Or  acetaminophen (TYLENOL) suppository 650 mg, Q6H PRN  insulin glargine (LANTUS) injection vial 18 Units, Nightly  insulin lispro (HUMALOG) injection vial 4 Units, TID WC  oxyCODONE (ROXICODONE) immediate release tablet 5 mg, Q6H PRN   Or  oxyCODONE (ROXICODONE) immediate release tablet 10 mg, Q6H PRN  cefepime (MAXIPIME) 2000 mg IVPB minibag, Q12H  vancomycin (VANCOCIN) 1,250 mg in dextrose 5 % 250 mL IVPB, Q24H  albumin human 25 % IV solution 25 g, Q8H  benzocaine-menthol (CEPACOL SORE THROAT) lozenge 1 lozenge, Q2H PRN        Allergies:  Atenolol     Review of Systems:   A 14 point review of symptoms completed.  Pertinent positives identified in the HPI, all other review of symptoms negative as below. Objective   PHYSICAL EXAM:    Vitals:    07/12/21 0501   BP:    Pulse:    Resp:    Temp:    SpO2: 96%    Weight: 152 lb 12.8 oz (69.3 kg)         General Appearance:  Alert, cooperative, no distress, appears stated age   Head:  Normocephalic, without obvious abnormality, atraumatic   Eyes:  PERRL, conjunctiva/corneas clear   Nose: Nares normal, no drainage or sinus tenderness   Throat: Lips, mucosa, and tongue normal   Neck: Supple, symmetrical, trachea midline, no adenopathy, thyroid: not enlarged, symmetric, no tenderness/mass/nodules, no carotid bruit or JVD   Lungs:   Clear to auscultation bilaterally, respirations unlabored   Chest Wall:  No deformity or tenderness   Heart:  Regular rate and rhythm, S1, S2 normal, no murmur, rub or gallop   Abdomen:   Soft, non-tender, bowel sounds active all four quadrants,  no masses, no organomegaly   Extremities: Extremities normal, atraumatic, no cyanosis. Right lower extremity without ischemia. Left lower extremity has significant 2+ pitting 3+ nonpitting edema. Is erythematous and slightly warm.   There is significant gauze and covering over the right leg that appears to be saturated with serosanguineous fluid   Pulses: 2+ and symmetric   Skin: Skin color, texture, turgor normal, no rashes or lesions   Pysch: Normal mood and affect   Neurologic: Normal gross motor and sensory exam.         Labs   CBC:   Lab Results   Component Value Date    WBC 4.9 07/12/2021    RBC 2.93 07/12/2021    HGB 8.2 07/12/2021    HCT 24.6 07/12/2021    MCV 83.9 07/12/2021    RDW 15.0 07/12/2021     07/12/2021     CMP:  Lab Results   Component Value Date     07/12/2021    K 3.3 07/12/2021    CL 98 07/12/2021    CO2 24 07/12/2021    BUN 10 07/12/2021    CREATININE 0.8 07/12/2021    GFRAA >60 07/12/2021    GFRAA >60 05/07/2013    AGRATIO 0.9 07/12/2021    LABGLOM >60 07/12/2021    GLUCOSE 66 07/12/2021 PROT 6.5 2021    PROT 7.1 2013    CALCIUM 8.5 2021    BILITOT 0.3 2021    ALKPHOS 123 2021    AST 11 2021    ALT 6 2021     PT/INR:  No results found for: PTINR  HgBA1c:  Lab Results   Component Value Date    LABA1C 8.1 2021     Lab Results   Component Value Date    TROPONINI 0.57 ([facility]) 2021         Cardiac Data     Last EK2021 NSR with PVC, IVCD, no ischemia, NSST changes    Echo: 2021   Normal left ventricle systolic function with an estimated ejection fraction  of 55%. No regional wall motion abnormalities are seen. Normal left ventricular diastolic filling pressure. Mild eccentric aortic regurgitation. Mild mitral and tricuspid regurgitation. Systolic pulmonary artery pressure (SPAP) is normal and estimated at 27 mmHg   (right atrial pressure 3 mmHg). Stress Test:    Cath:    Studies:   CT tib/fib  1. 2.7 x 3.4 x 13 cm low-attenuation fluid collection in the medial proximal   leg superficial soft tissues subjacent to the wound. CXR:  Suspected interstitial pulmonary edema.  Superimposed pneumonia at the lung   bases cannot be excluded.       Mild cardiomegaly. Assessment and Plan      1. Leg edema: c/w cellulitis  2. Chronic diastolic CHF   -212VYN on 6/10/21 OV and 152lbs today  3. CAD:               - 5/10/2021 3V CABG  4. Normocytic anemia  5. Hypokalemia  6. Normocytic anemia      PLAN  1. Patient does not appear to have significant signs of fluid on exam or CHF decompensation with the exception of her left lower extremity  2. CT surgery already notified  3. Holding additional Lasix at this time given negative exam for CHF. Allow fluid to mobilize and Lasix as needed  4. Aggressive tx of anemia will help long term CHF/edema issues  5. Continue aspirin, Lipitor, Plavix, Lopressor for now as tolerated  6.   Replete potassium to greater than 4        Patient Active Problem List   Diagnosis    Rheumatoid arthritis ([facility]    Psoriasis    GERD (gastroesophageal reflux disease)    History of tobacco use    Hyponatremia    Anemia    Cylindrical bronchiectasis (HCC)    Tracheobronchomalacia    Immunocompromised state ([facility]    Hypokalemia    Coronary artery disease    Stasis edema of both lower extremities    Essential hypertension    Lumbar spondylosis    Mitral valve insufficiency and aortic valve insufficiency    Mixed hyperlipidemia    Myopia of both eyes    Osteoporosis    Other chronic sinusitis    Primary open angle glaucoma (POAG) of both eyes, mild stage    Primary osteoarthritis of right hip    Type 2 diabetes mellitus with unspecified diabetic retinopathy without macular edema (HCC)    Uncontrolled type 2 diabetes mellitus with diabetic peripheral angiopathy without gangrene, with long-term current use of insulin (HCC)    Pressure ulcer of coccygeal region, stage 4 ([facility])    NSTEMI (non-ST elevated myocardial infarction) ([facility]    Hx of CABG    Cellulitis of left lower extremity    Cellulitis and abscess of left leg           Thank you for allowing us to participate in the care of [address] [place] [address]. Please call me with any questions [phone]. Janine Ni MD, [facility]  [place]   [phone] [facility]  [phone] [address] [place] [address]  7/12/2021 7:58 AM    I will address the patient's cardiac risk factors and adjusted pharmacologic treatment as needed. In addition, I have reinforced the need for patient directed risk factor modification. All questions and concerns were addressed to the patient/family. Alternatives to my treatment were discussed. The note was completed using EMR. Every effort was made to ensure accuracy; however, inadvertent computerized transcription errors may be present.

## 2021-07-12 NOTE — PROGRESS NOTES
4 Eyes Skin Assessment     The patient is being assess for   Admission    I agree that 2 RN's have performed a thorough Head to Toe Skin Assessment on the patient. ALL assessment sites listed below have been assessed. Areas assessed for pressure by both nurses:   [x]   Head, Face, and Ears   [x]   Shoulders, Back, and Chest, Abdomen  [x]   Arms, Elbows, and Hands   [x]   Coccyx, Sacrum, and Ischium  [x]   Legs, Feet, and Heels     Stage IV coccyx and non-healing surgical wound on LLE        Skin Assessed Under all Medical Devices by both nurses:  N/A            All Mepilex Borders were peeled back and area peeked at by both nurses:  N/A  Please list where Mepilex Borders are located:  N/A               **SHARE this note so that the co-signing nurse is able to place an eSignature**    Co-signer eSignature: Electronically signed by Anila Cheung RN on 7/12/21 at 3:22 AM EDT    Does the Patient have Skin Breakdown related to pressure?   Yes LDA WOUND CARE was Initiated documentation include the Blanca-wound, Wound Assessment, Measurements, Dressing Treatment, Drainage, and Color\",       Sven Prevention initiated:  Yes   Wound Care Orders initiated:  No      WOC nurse consulted for Pressure Injury (Stage 3,4, Unstageable, DTI, NWPT, Complex wounds)and New or Established Ostomies:  Yes      Primary Nurse eSignature: Electronically signed by Jamel Roth RN on 7/12/21 at 3:14 AM EDT

## 2021-07-12 NOTE — CONSULTS
CVTS Thoracic Progress Note:          CC:  LLE cellulitis with open wound left medial calf    Pt known to service: Non-STEMI with multivessel CAD  Procedure: 5/3/2021 Urgent CABG x3 with single GSV graft to the posterior ventricular branch of the RCA, separate single GSV graft to the second OM branch of Cx, pedicled LIMA to the LAD. BIMAL obliteration with 40 mm AtriCure left atrial clip. Cardiopulmonary bypass. Endoscopic vein harvest of the left GSV. ABHISHEK. Epiaortic ultrasound. Doppler verification of grafts. Bilateral five-level intercostal nerve block with Exparel. Platelet gel application. Sternal plating. 5/5/2021 Mediastinal exploration and evacuation of hematoma. The post operative period for this patient was complicated by bleeding and the need to go back to the OR on 5/5 for a mediastinal exploration and evacuation of hematoma. She had some confusion after she had anesthesia (each time) which eventually resolved. She was completely alert and oriented at the time of discharge. The patient was discharged on 5/10/2021 followed up with Dr. Alon Chandra on 5/18/21 and cardiology in 1-2 weeks. She resumed care for her chronic sacral wound with wound care clinic at [facility]. She is in a pain contract and reports having enough percocet  at home (has been in hospital for 16 days) that she is not getting any additional pain medication at time of discharge. She was at her pre-op weight and did not be discharged  home on any diuresis. As her BP has tended to be on the softer side, her home torsemide was stopped as well until she followed up with Dr. Alon Chandra. She presented to [facility] ED on 7/11 with c/o \"a leg infection\" near vein harvest site. She regularly is seen at the [facility] wound care center (Dr. Tai Henriquez) for a healing stage 4 sacral wound.   Pt has had progressively worsening redness, pain and swelling in her LLE despite a recent course of Bactrim/Levaquin (and eventually doxycycline) last week subsequently abnormality at the ankle     Assess/Plan:   Care per primary team and ID     LLE wound:   +MRSA and pseudomonas - continue cefepime and Vanc per ID recs  Wound will likely need debridement. Will review CT with team and discuss timing. COPD: continue home MDI/nebs. Needs pulmonary expansion - IS, acapella, OOBTC. Saturating 96% on 2L per NC. RA: home Enbrel d/c'd; continue prednisone for now    Sacral decubitus ulcer: Wound RN following. Plan to reapply wound vac to sacrum today. Diabetes: continue SSI     Prophy: lovenox and protonix   _________________________________________________________      LORENZO Bonilla - CNP  7/12/2021  9:59 AM  She will need I&D of her left lower leg wound. Discussed this with the patient and her daughter. Dr. Zarina Lomeli will take care of this tomorrow. I have discussed this with him. Note reviewed, events of last 24 hours reviewed along with vital signs and I/Os and patient examined. Assessment and plans discussed and are as outlined above.      Alba Fajardo MD, FACS, [facility], FACCP, [place]

## 2021-07-12 NOTE — CONSULTS
[facility] Nurse  Consult Note       NAME:  Chiqui Villanueva  MEDICAL RECORD NUMBER:  [id number]  AGE: 72 y.o. GENDER: female  : 1950  TODAY'S DATE:  2021    Subjective  I just saw Dr Ruben Cope 21 and he called and changed my antibiotics on 21 for this leg and by 21 the leg was worse. Reason for WOCN Evaluation and Assessment: stage IV sacrum, non healing LLE wound      Chiqui Villanueva is a 72 y.o. female referred by:   [x] Physician  [] Nursing  [] Other:     Wound Identification:  Wound Type: Stage 4 pressure injury sacrum/mid coccyx. New abscess/cellulitis at site of previous vein harvest for CABG to left lower inner leg. Contributing Factors: edema, chronic pressure, decreased mobility and shear force    Wound History: Hx CABG x 3 on 5/3/21 by Dr Fan Alejo. She presented to [facility] ED on  with c/o \"a leg infection\" near vein harvest site. She regularly is seen at the [facility] (Dr. Ruben Cope) for a healing stage 4 sacral pressure injury. Last seen by Dr Ruben Cope on 21. At that time MD started antibiotic (Bactrim/Levaquin) and the wound was cultured. Culture results positive for MRSA and Pseudomonas and Dr Ruben Cope called patient on 21 and changed the antibiotic order to doxycycline.   By 21 the wound had had progressively worsening with redness, pain and swelling in he LLE despite the recent course of antibiotics and daughter brought patient to the hospital.     Current Wound Care Treatment: dry dressings on wounds at this time     Patient Goal of Care:  [x] Wound Healing  [] Odor Control  [] Palliative Care  [] Pain Control   [] Other:         PAST MEDICAL HISTORY        Diagnosis Date    Atherosclerosis of native artery of right lower extremity with rest pain ([address] 2017    Back pain     Branch retinal vein occlusion 2012    Bronchiectasis with acute exacerbation (HCC)     Closed compression fracture of thoracic vertebra ([address] 01/15/2020    Closed fracture of facial bone with routine healing 11/21/2016    Closed jaw fracture ([address] 01/15/2020    Community acquired pneumonia of left lower lobe of lung     Compression fracture of L1 lumbar vertebra ([address] 01/15/2020    COPD (chronic obstructive pulmonary disease) (HCC)     Fracture of tibial plateau, closed, left, initial encounter 12/05/2017    Minimally displaced zone I fracture of sacrum (HCC) 09/02/2020    Mucus plugging of bronchi     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 Bone density test last year showed severe osteoporosis. Recently, two broken vertebrae (L1, L2) due to coughing. Diagnosed with tracheomalacia and stated she must cough very hard to clear phlegm. Was coughing due to upper respiratory infections which have been treated. Hx of laminectomy and recent kyphoplasty.    Refractured her jawbone which was previously repaired wi    Post herpetic neuralgia     Proximal humerus fracture 10/01/2019    Rheumatoid arthritis ([address]     Shingles 05/2020    Sleep apnea     Temporal arteritis ([address] 07/10/2013    Tobacco use 10/19/2017    Tracheomalacia     Vitreous hemorrhage, right eye ([address] 02/21/2020       PAST SURGICAL HISTORY    Past Surgical History:   Procedure Laterality Date    ARTERY BIOPSY Right 03/01/2021    at [address]  05/30/2013    left temporal artery biopsy    BACK SURGERY  08/2020    BRONCHOSCOPY      BRONCHOSCOPY N/A 06/12/2019    BRONCHOSCOPY ALVEOLAR LAVAGE performed by Sarthak Gandhi MD at [address]  05/03/2021    CATARACT REMOVAL      CORONARY ARTERY BYPASS GRAFT N/A 05/03/2021    CORONARY ARTERY BYPASS X3 WITH LEFT ATRIAL APPENDAGE CLIP, 5 LEVEL BILATERAL INTERCOSTAL NERVE BLOCK, STERNAL PLATING performed by David Zelaya Karn Landau, MD at [facility]      HERNIA REPAIR      KNEE ARTHROSCOPY      left    KYPHOSIS SURGERY      LAMINECTOMY      MANDIBLE FRACTURE SURGERY      MANDIBLE FRACTURE SURGERY  2020    MEDIASTINOSCOPY N/A 2021    MEDIASTINAL EXPLORATION AND EVACUATION OF HEMATOMA performed by Darcy Ocampo MD at [address]  2021    sacral wound debridement    PRESSURE ULCER DEBRIDEMENT N/A 2021    SACRAL WOUND DEBRIDEMENT performed by Trevin Porter MD at [address] SEPTOPLASTY  2013    FESS with balloon    SPINAL FUSION      TUBAL LIGATION      UPPER GASTROINTESTINAL ENDOSCOPY  2014    Dilitation       FAMILY HISTORY    Family History   Problem Relation Age of Onset    Diabetes Mother     Hypertension Mother     Asthma Other     Heart Disease Brother     Diabetes Brother     Diabetes Sister     Heart Disease Brother     Cancer Neg Hx     Emphysema Neg Hx     Heart Failure Neg Hx        SOCIAL HISTORY    Social History     Tobacco Use    Smoking status: Former Smoker     Packs/day: 1.00     Years: 20.00     Pack years: 20.00     Types: Cigarettes     Quit date: 1991     Years since quittin.2    Smokeless tobacco: Never Used   Vaping Use    Vaping Use: Never used   Substance Use Topics    Alcohol use: Not Currently     Alcohol/week: 0.0 standard drinks     Comment: rarely    Drug use: No       ALLERGIES    Allergies   Allergen Reactions    Atenolol      Cough         MEDICATIONS    No current facility-administered medications on file prior to encounter. Current Outpatient Medications on File Prior to Encounter   Medication Sig Dispense Refill    doxycycline hyclate (VIBRA-TABS) 100 MG tablet Take 1 tablet by mouth 2 times daily for 7 days -- continue your levofloxacin, add this antibiotic, but STOP the Bactrim (trimethoprpm-sulfamethoxazole).  14 tablet 0    levoFLOXacin (LEVAQUIN) 500 MG tablet Take 1 tablet by mouth daily for 7 days 7 tablet 0    azithromycin (ZITHROMAX) 250 MG tablet TAKE 1 TABLET BY MOUTH EVERY DAY 90 tablet 3    DALIRESP 500 MCG tablet TAKE 1 TABLET BY MOUTH DAILY 30 tablet 11    clopidogrel (PLAVIX) 75 MG tablet Take 1 tablet by mouth daily 30 tablet 11    metoprolol tartrate (LOPRESSOR) 25 MG tablet Take 0.5 tablets by mouth 2 times daily Hold this medication for pulse <03 or systolic BP <845 30 tablet 11    metroNIDAZOLE (FLAGYL) 250 MG tablet Crush one tablet into a fine powder, sprinkle into wound TIW as needed for malodor, at time of dressing change. 30 tablet 1    predniSONE (DELTASONE) 1 MG tablet Take 4 mg by mouth daily      insulin glargine (LANTUS) 100 UNIT/ML injection vial Inject 15 Units into the skin nightly 1 vial 0    oxyCODONE-acetaminophen (PERCOCET)  MG per tablet Take 1 tablet by mouth daily.  docusate sodium (COLACE) 100 MG capsule Take 100 mg by mouth 2 times daily as needed for Constipation      DULoxetine (CYMBALTA) 60 MG extended release capsule Take 60 mg by mouth daily      gabapentin (NEURONTIN) 300 MG capsule Take 300 mg by mouth 2 times daily.  latanoprost (XALATAN) 0.005 % ophthalmic solution Place 1 drop into both eyes nightly      Teriparatide, Recombinant, (FORTEO) 600 MCG/2.4ML SOPN injection Inject 20 mcg into the skin daily      NARCAN 4 MG/0.1ML LIQD nasal spray PLEASE SEE ATTACHED FOR DETAILED DIRECTIONS      morphine (MS CONTIN) 15 MG extended release tablet 15 mg 2 times daily.        ipratropium (ATROVENT) 0.06 % nasal spray USE 2 SPRAYS BY NASAL ROUTE 2-4 TIMES DAILY 1 Bottle 5    albuterol sulfate  (90 Base) MCG/ACT inhaler INHALE 2 PUFFS INTO THE LUNGS EVERY 4 HOURS AS NEEDED FOR WHEEZING 1 Inhaler 5    vitamin D (CHOLECALCIFEROL) 1000 UNIT TABS tablet Take 5,000 Units by mouth daily       calcium carbonate (OSCAL) 500 MG TABS tablet Take 500 mg by mouth daily      atorvastatin (LIPITOR) 40 MG tablet Take 40 mg by mouth      cyclobenzaprine (FLEXERIL) 10 MG tablet Take 10 mg by mouth 2 times daily as needed       meloxicam (MOBIC) 15 MG tablet Patient states taking only as needed      aspirin EC 81 MG EC tablet Take 1 tablet by mouth daily      insulin lispro (HUMALOG) 100 UNIT/ML injection vial Inject 0-12 Units into the skin 3 times daily (with meals)      fluticasone (FLONASE) 50 MCG/ACT nasal spray INHALE 2 SPRAYS IN EACH NOSTRIL DAILY 1 Bottle 5    cetirizine (ZYRTEC) 10 MG tablet Take 10 mg by mouth daily.  etanercept (ENBREL) 50 MG/ML injection Inject 50 mg into the skin every 7 days.  omeprazole (PRILOSEC) 40 MG capsule Take 40 mg by mouth daily       Insulin Syringe-Needle U-100 31G X 5/16\" 0.5 ML MISC USE 5 TIMES DAILY      ACCU-CHEK CEDRICK PLUS strip TEST 4 TIMES DAILY  3    Misc. Devices (ACAPELLA) MISC Take 1 Device by mouth as needed 1 each 0       Objective  Lying in bed. Daughter at bed side. BP (!) 101/54   Pulse 88   Temp 99.5 °F (37.5 °C) (Oral)   Resp 16   Ht 5' 8\" (1.727 m)   Wt 152 lb 12.8 oz (69.3 kg)   SpO2 97%   BMI 23.23 kg/m²     LABS:  WBC:    Lab Results   Component Value Date    WBC 4.9 07/12/2021     H/H:    Lab Results   Component Value Date    HGB 8.2 07/12/2021    HCT 24.6 07/12/2021     PTT:    Lab Results   Component Value Date    APTT 35.9 05/03/2021   [APTT}  PT/INR:    Lab Results   Component Value Date    PROTIME 12.1 05/04/2021    INR 1.04 05/04/2021     HgBA1c:    Lab Results   Component Value Date    LABA1C 8.1 05/03/2021       Assessment  Sacral wound with red granulation tissue, maegan wound intact. Drainage serosanguinous. Left leg larger than right. Left leg red. Induration and fluctuance around left inner open wound bed. Wound bed with thick yellow slough (yellos & black) present, maegan wound also with deflated bulla. See photo.    Sven Risk Score: Sven Scale Score: 15    Patient Active Problem List   Diagnosis    Rheumatoid arthritis (HCC)    Psoriasis    GERD (gastroesophageal reflux disease)    History of tobacco use    Hyponatremia    Anemia    Cylindrical bronchiectasis (HCC)    Tracheobronchomalacia    Immunocompromised state (HCC)    Hypokalemia    Coronary artery disease    Stasis edema of both lower extremities    Essential hypertension    Lumbar spondylosis    Mitral valve insufficiency and aortic valve insufficiency    Mixed hyperlipidemia    Myopia of both eyes    Osteoporosis    Other chronic sinusitis    Primary open angle glaucoma (POAG) of both eyes, mild stage    Primary osteoarthritis of right hip    Type 2 diabetes mellitus with unspecified diabetic retinopathy without macular edema (HCC)    Uncontrolled type 2 diabetes mellitus with diabetic peripheral angiopathy without gangrene, with long-term current use of insulin ([facility])    Pressure ulcer of coccygeal region, stage 4 ([facility])    NSTEMI (non-ST elevated myocardial infarction) ([facility])    Hx of CABG    Left leg cellulitis    Cellulitis and abscess of left leg    Mild malnutrition ([facility])    Chronic diastolic congestive heart failure ([facility])       Measurements:     Negative Pressure Wound Therapy Sacrum Mid (Active)   $ Standard NPWT >50 sq cm PER TX $ Yes 07/12/21 1256   Wound Type Pressure ulcer: Stage IV 07/12/21 1256   Unit Type Rental I # [id number] 07/12/21 1256   Dressing Type Black foam 07/12/21 1256   Number of pieces used 2 07/12/21 1256   Cycle Continuous 07/12/21 1256   Target Pressure (mmHg) 125 07/12/21 1256   Intensity 1 07/12/21 1256   Irrigation Solution Sodium chloride 0.9% 07/12/21 1256   Instillation Volume  20 mL 07/12/21 1256   Soak Time  10 minutes 07/12/21 1256   Vac Frequency 3 hours 07/12/21 1256   Canister changed?  Yes 07/12/21 1256   Dressing Status Changed 07/12/21 1256   Dressing Changed Changed/New 07/12/21 1256   Drainage Amount Moderate 07/12/21 1256   Drainage Description Serosanguinous 07/12/21 1256 Dressing Change Due 07/14/21 07/12/21 1256   Wound Assessment Granulation tissue; Red 07/12/21 1256   Blanca-wound Assessment Clean;Dry; Intact 07/12/21 1256   Shape round 07/12/21 1256   Odor None 07/12/21 1256   Number of days: 47         Wound 12/18/20 #2, Sacrum, Pressure Injury, Stage 4, Onset 5/2020 (Active)   Wound Image   07/12/21 1256   Wound Etiology Pressure Stage  4 07/12/21 1256   Dressing Status New dressing applied 07/12/21 1256   Wound Cleansed Cleansed with saline 07/12/21 1256   Dressing/Treatment Barrier film;Hydrocolloid; Negative pressure wound therapy 07/12/21 1256   Offloading for Diabetic Foot Ulcers Other (comment) 07/12/21 1256   Dressing Change Due 07/14/21 07/12/21 1256   Wound Length (cm) 5 cm 07/12/21 1256   Wound Width (cm) 2.5 cm 07/12/21 1256   Wound Depth (cm) 0 cm 07/12/21 1256   Wound Surface Area (cm^2) 12.5 cm^2 07/12/21 1256   Change in Wound Size % (l*w) -8233.33 07/12/21 1256   Wound Volume (cm^3) 0 cm^3 07/12/21 1256   Wound Healing % 100 07/12/21 1256   Post-Procedure Length (cm) 4.5 cm 07/07/21 1021   Post-Procedure Width (cm) 2 cm 07/07/21 1021   Post-Procedure Depth (cm) 2 cm 07/07/21 1021   Post-Procedure Surface Area (cm^2) 9 cm^2 07/07/21 1021   Post-Procedure Volume (cm^3) 18 cm^3 07/07/21 1021   Distance Tunneling (cm) 3.5 cm 07/07/21 0921   Tunneling Position ___ O'Clock 1 07/07/21 0921   Undermining Starts ___ O'Clock 12 07/12/21 1256   Undermining Ends___ O'Clock 12 07/12/21 1256   Undermining Maxium Distance (cm) 3.5 07/12/21 1256   Wound Assessment Pink/red;Granulation tissue 07/12/21 1256   Drainage Amount Small 07/12/21 1256   Drainage Description Serosanguinous 07/12/21 1256   Odor None 07/12/21 1256   Blanca-wound Assessment Dry/flaky; Intact 07/12/21 1256   Margins Unattached edges; Defined edges 07/12/21 1256   Wound Thickness Description not for Pressure Injury Full thickness 07/12/21 1256   Number of days: 206      Coccyx/sacrum           Wound 07/07/21 #3, left medial leg, dehisced surgical, full thickness, onset 5/2021 (Active)   Wound Image    07/12/21 1256   Wound Etiology Non-Healing Surgical 07/12/21 1256   Dressing Status New dressing applied 07/12/21 1256   Wound Cleansed Cleansed with saline 07/12/21 1256   Dressing/Treatment Moist to moist;Dry dressing;Roll gauze 07/12/21 1256   Offloading for Diabetic Foot Ulcers Other (comment) 07/12/21 1256   Dressing Change Due 07/13/21 07/12/21 1256   Wound Length (cm) 7 cm 07/12/21 1256   Wound Width (cm) 6 cm 07/12/21 1256   Wound Depth (cm) 0.1 cm 07/12/21 1256   Wound Surface Area (cm^2) 42 cm^2 07/12/21 1256   Change in Wound Size % (l*w) -1020 07/12/21 1256   Wound Volume (cm^3) 4.2 cm^3 07/12/21 1256   Wound Healing % -124 07/12/21 1256   Post-Procedure Length (cm) 2.5 cm 07/07/21 1021   Post-Procedure Width (cm) 1.5 cm 07/07/21 1021   Post-Procedure Depth (cm) 0.5 cm 07/07/21 1021   Post-Procedure Surface Area (cm^2) 3.75 cm^2 07/07/21 1021   Post-Procedure Volume (cm^3) 1.875 cm^3 07/07/21 1021   Distance Tunneling (cm) 0 cm 07/12/21 1256   Tunneling Position ___ O'Clock 0 07/12/21 1256   Undermining Starts ___ O'Clock 0 07/12/21 1256   Undermining Ends___ O'Clock 0 07/12/21 1256   Undermining Maxium Distance (cm) 0 07/12/21 1256   Wound Assessment Slough 07/12/21 1256   Drainage Amount Moderate 07/12/21 1256   Drainage Description Serosanguinous;Purulent 07/12/21 1256   Odor None 07/12/21 1256   Blanca-wound Assessment Blanchable erythema;Edematous; Induration; Fluctulant 07/12/21 1256   Margins Attached edges; Defined edges, deflated bulla 07/12/21 1256   Number of days: 5     Left lower inner leg               Wound 07/12/21 Knee Left; Inner purple (Active)   Wound Image   07/12/21 1259   Wound Etiology Deep tissue/Injury 07/12/21 1259   Wound Cleansed Cleansed with saline 07/12/21 1259   Dressing/Treatment Pharmaceutical agent (see MAR) 07/12/21 1259   Offloading for Diabetic Foot Ulcers Other (comment) 07/12/21 1259   Wound Length (cm) 0.2 cm 07/12/21 1259   Wound Width (cm) 6 cm 07/12/21 1259   Wound Depth (cm) 0 cm 07/12/21 1259   Wound Surface Area (cm^2) 1.2 cm^2 07/12/21 1259   Wound Volume (cm^3) 0 cm^3 07/12/21 1259   Distance Tunneling (cm) 0 cm 07/12/21 1259   Tunneling Position ___ O'Clock 0 07/12/21 1259   Undermining Starts ___ O'Clock 0 07/12/21 1259   Undermining Ends___ O'Clock 0 07/12/21 1259   Undermining Maxium Distance (cm) 0 07/12/21 1259   Wound Assessment Purple/maroon 07/12/21 1259   Drainage Amount None 07/12/21 1259   Odor None 07/12/21 1259   Blanca-wound Assessment Edematous; Blanchable erythema 07/12/21 1259   Margins Attached edges; Defined edges 07/12/21 1259   Number of days: 0     Left inner knee: Wound 07/12/21 Ankle Left; Inner purple (Active)   Wound Image   07/12/21 1259   Wound Etiology Deep tissue/Injury 07/12/21 1259   Wound Cleansed Cleansed with saline 07/12/21 1259   Dressing/Treatment Pharmaceutical agent (see MAR) 07/12/21 1259   Offloading for Diabetic Foot Ulcers Other (comment) 07/12/21 1259   Wound Length (cm) 0.2 cm 07/12/21 1259   Wound Width (cm) 2 cm 07/12/21 1259   Wound Depth (cm) 0 cm 07/12/21 1259   Wound Surface Area (cm^2) 0.4 cm^2 07/12/21 1259   Wound Volume (cm^3) 0 cm^3 07/12/21 1259   Distance Tunneling (cm) 0 cm 07/12/21 1259   Tunneling Position ___ O'Clock 0 07/12/21 1259   Undermining Starts ___ O'Clock 0 07/12/21 1259   Undermining Ends___ O'Clock 0 07/12/21 1259   Undermining Maxium Distance (cm) 0 07/12/21 1259   Wound Assessment Purple/maroon 07/12/21 1259   Drainage Amount None 07/12/21 1259   Odor None 07/12/21 1259   Blanca-wound Assessment Blanchable erythema;Edematous 07/12/21 1259   Margins Attached edges; Defined edges 07/12/21 1259   Number of days: 0     Left inner ankle:             Response to treatment:  Well tolerated by patient.      Pain Assessment:  Severity:  2 / 10  Quality of pain: aching  Wound Pain Timing/Severity: intermittent  Premedicated: No    Plan   Plan of Care: Wound 07/07/21 #3, left medial leg, dehisced surgical, full thickness, onset 5/2021-Dressing/Treatment: Moist to moist, Dry dressing, Roll gauze  Wound 12/18/20 #2, Sacrum, Pressure Injury, Stage 4, Onset 5/2020-Dressing/Treatment: Barrier film, Hydrocolloid, Negative pressure wound therapy  Wound 07/12/21 Knee Left; Inner purple-Dressing/Treatment: Pharmaceutical agent (see MAR) (venelex ordered)  Wound 07/12/21 Ankle Left; Inner purple-Dressing/Treatment: Pharmaceutical agent (see MAR) (venelex ordered bid)     Dr Chato Avery here at the end of dressing placements so MD updated and viewed recently taken photos. Orders for veraflo and venelex received. Recommend:  Negative pressure wound therapy with Veraflo. Instill 20 ml into wound bed, dwell x 10 minutes every 3 hours. Connect to 125 mmHg low continuous suction. Left lower inner leg, apply moist to moist dressing BID, cover with dry dressing, roll guaze and ace wrap from toes to knee. Venelex to purple areas left inner knee and inner ankle bid. As above dressing changes done per wound care. Wound care to follow. Call wound care for deterioration [phone]. Change cannister once a week or when full. If suction fails or patient is discharged:  - remove vac dressing  - cleanse wound with normal saline   - pack wound with saline moistened gauze and change every 12 hours     Specialty Bed Required : Yes   [x] Low Air Loss   [x] Pressure Redistribution  [x] Fluid Immersion dolphin mattress  [] Bariatric  [] Total Pressure Relief  [] Other:     Current Diet: ADULT DIET;  Regular; 4 carb choices (60 gm/meal); 1800 ml  Adult Oral Nutrition Supplement; Diabetic Oral Supplement  Dietician consult:  Yes    Discharge Plan:  Placement for patient upon discharge: skilled nursing    Patient appropriate for Outpatient [address]: Yes    Referrals:  []  / discharge planner following, updated on NPWT placement. [] [facility] - patient is being followed by [facility] home care.   Home Negative pressure wound care machine in the home is the Medela machine supplied by [address] [place] [address]  [] Supplies  [] Other    Patient/Caregiver Teaching: Daughter Carmelo Diaz and patient updated on [facility] treatment  Level of patient/caregiver understanding able to:   [] Indicates understanding       [x] Needs reinforcement  [] Unsuccessful      [x] Verbal Understanding  [] Demonstrated understanding       [] No evidence of learning  [] Refused teaching         [] N/A       Electronically signed by Tramaine Pinto, RN, MSN, Lynne Vargas on 7/12/2021 at 1:24 PM

## 2021-07-12 NOTE — CARE COORDINATION
Wound care in room. CM will attempt to see patient later today and/or contact family to complete initial assessment. Per chart review, pt's grand daughter lives with her. Previously active with [facility] and [facility] for wound vac.

## 2021-07-12 NOTE — PROGRESS NOTES
Infectious Disease Follow up Notes    CC : Wound infection      Antibiotics:   Cefepime 2g q12  Vanc 1250 q24    Admit Date:   7/11/2021  Hospital Day: 2    Subjective:   Low grade fever noted  She endorses pain in the L leg. Tolerating abx well so far. Objective:     Patient Vitals for the past 8 hrs:   BP Temp Temp src Pulse Resp SpO2 Height   07/12/21 1145 (!) 101/54 99.5 °F (37.5 °C) Oral 88 16 97 % --   07/12/21 1121 -- -- -- -- -- -- 5' 8\" (1.727 m)   07/12/21 0945 (!) 104/50 -- -- -- -- -- --   07/12/21 0900 -- -- -- -- -- 96 % --   07/12/21 0815 (!) 100/51 99.7 °F (37.6 °C) Oral 93 18 96 % --       EXAM:  General:  Alert, oriented, NAD    HEENT:  TOAN, sclera anicteric   MMM, no thrush     LUNGS:   Non-labored breathing   ABD:  Sot, NT.  +BS  EXT:  Serous drainage evident LLE dressing. +Cellulitic changes to foot.   ++edema of the leg          LINE: PIV site ok                    Scheduled Meds:   Venelex   Topical BID    atorvastatin  40 mg Oral Nightly    cetirizine  10 mg Oral Daily    [Held by provider] clopidogrel  75 mg Oral Daily    Roflumilast  500 mcg Oral Daily    DULoxetine  60 mg Oral Daily    fluticasone  1 spray Each Nostril Daily    gabapentin  300 mg Oral BID    ipratropium  2 spray Each Nostril 4x Daily    latanoprost  1 drop Both Eyes Nightly    metoprolol tartrate  12.5 mg Oral BID    morphine  15 mg Oral BID    teriparatide  20 mcg Subcutaneous Daily    Vitamin D  5,000 Units Oral Daily    predniSONE  4 mg Oral Daily    pantoprazole  40 mg Oral QAM AC    calcium elemental  500 mg Oral Daily    aspirin EC  81 mg Oral Daily    insulin lispro  0-12 Units Subcutaneous TID WC    insulin lispro  0-6 Units Subcutaneous Nightly    sodium chloride flush  10 mL Intravenous 2 times per day    [Held by provider] enoxaparin  40 mg Subcutaneous Daily    insulin glargine  0.25 Units/kg Subcutaneous Nightly    insulin lispro  0.05 Units/kg Subcutaneous TID     cefepime  2,000 mg Intravenous Q12H    vancomycin  1,250 mg Intravenous Q24H       Continuous Infusions:   dextrose      sodium chloride 25 mL (07/12/21 1008)          Data Review:    Lab Results   Component Value Date    WBC 4.9 07/12/2021    HGB 8.2 (L) 07/12/2021    HCT 24.6 (L) 07/12/2021    MCV 83.9 07/12/2021     07/12/2021     Lab Results   Component Value Date    CREATININE 0.8 07/12/2021    BUN 10 07/12/2021     (L) 07/12/2021    K 3.3 (L) 07/12/2021    CL 98 (L) 07/12/2021    CO2 24 07/12/2021       Hepatic Function Panel:   Lab Results   Component Value Date    ALKPHOS 123 07/12/2021    ALT 6 07/12/2021    AST 11 07/12/2021    PROT 6.5 07/12/2021    PROT 7.1 03/21/2013    BILITOT 0.3 07/12/2021    BILIDIR <0.2 05/03/2021    IBILI see below 05/03/2021    LABALBU 3.0 07/12/2021         MICRO:  7/7 Wound culture   Staph aureus mrsa  Antibiotic Interpretation LINDA Status    clindamycin Sensitive <=0.25 mcg/mL     erythromycin Resistant >=8 mcg/mL     oxacillin Resistant >=4 mcg/mL     tetracycline Sensitive <=1 mcg/mL     trimethoprim-sulfamethoxazole Resistant >=320 mcg/mL     vancomycin Sensitive <=0.5 mcg/mL     Pseudomonas aeruginosa  Antibiotic Interpretation LINDA Status    cefepime Sensitive <=2 mcg/mL     ciprofloxacin Sensitive <=1 mcg/mL     gentamicin Sensitive <=4 mcg/mL     meropenem Sensitive <=1 mcg/mL     piperacillin-tazobactam Sensitive <=16 mcg/mL     tobramycin Sensitive <=4 mcg/mL       7/11 BC x2 NGTD       IMAGING:  CT E 7/11/21  Impression   1. 2.7 x 3.4 x 13 cm low-attenuation fluid collection in the medial proximal   leg superficial soft tissues subjacent to the wound.        Assessment:     Patient Active Problem List    Diagnosis Date Noted    Mild malnutrition ([address] 07/12/2021    Surgical wound dehiscence, initial encounter (at LLE SVG harvest site) 07/12/2021    Chronic diastolic congestive heart failure ([address]     Hx of CABG 07/11/2021     Overview Note:     5/03/2021      Left leg cellulitis 07/11/2021     Overview Note:     S/P vein harvest 05/2021 for CABG      Cellulitis and abscess of left leg 07/11/2021    NSTEMI (non-ST elevated myocardial infarction) ([address] 04/23/2021    Lumbar spondylosis 12/21/2020    Pressure ulcer of coccygeal region, stage 4 ([address] 12/21/2020    Myopia of both eyes 02/21/2020    Primary open angle glaucoma (POAG) of both eyes, mild stage 02/21/2020    Type 2 diabetes mellitus with unspecified diabetic retinopathy without macular edema ([address] 02/21/2020    Hypokalemia     Tracheobronchomalacia     Immunocompromised state ([address]     Cylindrical bronchiectasis ([address] 12/19/2018    Osteoporosis 09/25/2018    Primary osteoarthritis of right hip 09/25/2018    History of tobacco use 10/19/2017    Hyponatremia 10/19/2017    Anemia 10/19/2017    Psoriasis     GERD (gastroesophageal reflux disease)     Coronary artery disease 07/03/2017    Uncontrolled type 2 diabetes mellitus with diabetic peripheral angiopathy without gangrene, with long-term current use of insulin ([address] 06/28/2017    Mitral valve insufficiency and aortic valve insufficiency 03/02/2017    Stasis edema of both lower extremities 11/17/2015    Other chronic sinusitis 06/16/2014    Mixed hyperlipidemia 12/26/2012    Rheumatoid arthritis ([address] 01/19/2012    Essential hypertension 12/22/2011       MMP     CAD s/p CABG 5/30/21    Wound infection at LE harvest site with large abscess vs infected hematoma   Isolation MRSA, Pseudomonas in wound culture   Plan for formal debridement noted     No abx allergies     Plan:     Continue IV vanc and cefepime   Plan for debridement of wound noted       Discussed with patient/family, all questions answered        Rhonda Fragoso MD  Phone: [phone]   Fax : [phone]

## 2021-07-13 ENCOUNTER — ANESTHESIA (OUTPATIENT)
Dept: OPERATING ROOM | Age: 71
DRG: 856 | End: 2021-07-13
Payer: MEDICARE

## 2021-07-13 VITALS — DIASTOLIC BLOOD PRESSURE: 40 MMHG | OXYGEN SATURATION: 100 % | SYSTOLIC BLOOD PRESSURE: 89 MMHG

## 2021-07-13 LAB
ANION GAP SERPL CALCULATED.3IONS-SCNC: 11 MMOL/L (ref 3–16)
BUN BLDV-MCNC: 10 MG/DL (ref 7–20)
CALCIUM SERPL-MCNC: 8.6 MG/DL (ref 8.3–10.6)
CHLORIDE BLD-SCNC: 99 MMOL/L (ref 99–110)
CO2: 24 MMOL/L (ref 21–32)
CREAT SERPL-MCNC: 0.8 MG/DL (ref 0.6–1.2)
GFR AFRICAN AMERICAN: >60
GFR NON-AFRICAN AMERICAN: >60
GLUCOSE BLD-MCNC: 133 MG/DL (ref 70–99)
GLUCOSE BLD-MCNC: 133 MG/DL (ref 70–99)
GLUCOSE BLD-MCNC: 140 MG/DL (ref 70–99)
GLUCOSE BLD-MCNC: 147 MG/DL (ref 70–99)
GLUCOSE BLD-MCNC: 180 MG/DL (ref 70–99)
GLUCOSE BLD-MCNC: 257 MG/DL (ref 70–99)
HCT VFR BLD CALC: 25.2 % (ref 36–48)
HEMOGLOBIN: 8.3 G/DL (ref 12–16)
MCH RBC QN AUTO: 27.8 PG (ref 26–34)
MCHC RBC AUTO-ENTMCNC: 33.1 G/DL (ref 31–36)
MCV RBC AUTO: 84 FL (ref 80–100)
PDW BLD-RTO: 15.8 % (ref 12.4–15.4)
PERFORMED ON: ABNORMAL
PLATELET # BLD: 195 K/UL (ref 135–450)
PMV BLD AUTO: 7.4 FL (ref 5–10.5)
POTASSIUM SERPL-SCNC: 4 MMOL/L (ref 3.5–5.1)
RBC # BLD: 3 M/UL (ref 4–5.2)
SODIUM BLD-SCNC: 134 MMOL/L (ref 136–145)
VANCOMYCIN TROUGH: 8.9 UG/ML (ref 10–20)
WBC # BLD: 3.3 K/UL (ref 4–11)

## 2021-07-13 PROCEDURE — 87015 SPECIMEN INFECT AGNT CONCNTJ: CPT

## 2021-07-13 PROCEDURE — 87205 SMEAR GRAM STAIN: CPT

## 2021-07-13 PROCEDURE — 2709999900 HC NON-CHARGEABLE SUPPLY: Performed by: THORACIC SURGERY (CARDIOTHORACIC VASCULAR SURGERY)

## 2021-07-13 PROCEDURE — 6360000002 HC RX W HCPCS

## 2021-07-13 PROCEDURE — 3700000000 HC ANESTHESIA ATTENDED CARE: Performed by: THORACIC SURGERY (CARDIOTHORACIC VASCULAR SURGERY)

## 2021-07-13 PROCEDURE — 3600000018 HC SURGERY OHS ADDTL 15MIN: Performed by: THORACIC SURGERY (CARDIOTHORACIC VASCULAR SURGERY)

## 2021-07-13 PROCEDURE — 1200000000 HC SEMI PRIVATE

## 2021-07-13 PROCEDURE — 80202 ASSAY OF VANCOMYCIN: CPT

## 2021-07-13 PROCEDURE — 87176 TISSUE HOMOGENIZATION CULTR: CPT

## 2021-07-13 PROCEDURE — 2700000000 HC OXYGEN THERAPY PER DAY

## 2021-07-13 PROCEDURE — 97607 NEG PRS WND THR NDME<=50SQCM: CPT | Performed by: THORACIC SURGERY (CARDIOTHORACIC VASCULAR SURGERY)

## 2021-07-13 PROCEDURE — 6360000002 HC RX W HCPCS: Performed by: HOSPITALIST

## 2021-07-13 PROCEDURE — 3600000008 HC SURGERY OHS BASE: Performed by: THORACIC SURGERY (CARDIOTHORACIC VASCULAR SURGERY)

## 2021-07-13 PROCEDURE — 36415 COLL VENOUS BLD VENIPUNCTURE: CPT

## 2021-07-13 PROCEDURE — 87186 SC STD MICRODIL/AGAR DIL: CPT

## 2021-07-13 PROCEDURE — 2500000003 HC RX 250 WO HCPCS: Performed by: ANESTHESIOLOGY

## 2021-07-13 PROCEDURE — 7100000001 HC PACU RECOVERY - ADDTL 15 MIN: Performed by: THORACIC SURGERY (CARDIOTHORACIC VASCULAR SURGERY)

## 2021-07-13 PROCEDURE — 85027 COMPLETE CBC AUTOMATED: CPT

## 2021-07-13 PROCEDURE — 87102 FUNGUS ISOLATION CULTURE: CPT

## 2021-07-13 PROCEDURE — 94761 N-INVAS EAR/PLS OXIMETRY MLT: CPT

## 2021-07-13 PROCEDURE — 0JBP0ZZ EXCISION OF LEFT LOWER LEG SUBCUTANEOUS TISSUE AND FASCIA, OPEN APPROACH: ICD-10-PCS | Performed by: THORACIC SURGERY (CARDIOTHORACIC VASCULAR SURGERY)

## 2021-07-13 PROCEDURE — 2580000003 HC RX 258: Performed by: HOSPITALIST

## 2021-07-13 PROCEDURE — 86403 PARTICLE AGGLUT ANTBDY SCRN: CPT

## 2021-07-13 PROCEDURE — 87116 MYCOBACTERIA CULTURE: CPT

## 2021-07-13 PROCEDURE — 87206 SMEAR FLUORESCENT/ACID STAI: CPT

## 2021-07-13 PROCEDURE — 7100000000 HC PACU RECOVERY - FIRST 15 MIN: Performed by: THORACIC SURGERY (CARDIOTHORACIC VASCULAR SURGERY)

## 2021-07-13 PROCEDURE — 87077 CULTURE AEROBIC IDENTIFY: CPT

## 2021-07-13 PROCEDURE — 2580000003 HC RX 258: Performed by: ANESTHESIOLOGY

## 2021-07-13 PROCEDURE — 94660 CPAP INITIATION&MGMT: CPT

## 2021-07-13 PROCEDURE — 6360000002 HC RX W HCPCS: Performed by: ANESTHESIOLOGY

## 2021-07-13 PROCEDURE — 3700000001 HC ADD 15 MINUTES (ANESTHESIA): Performed by: THORACIC SURGERY (CARDIOTHORACIC VASCULAR SURGERY)

## 2021-07-13 PROCEDURE — 87070 CULTURE OTHR SPECIMN AEROBIC: CPT

## 2021-07-13 PROCEDURE — 11042 DBRDMT SUBQ TIS 1ST 20SQCM/<: CPT | Performed by: THORACIC SURGERY (CARDIOTHORACIC VASCULAR SURGERY)

## 2021-07-13 PROCEDURE — 2500000003 HC RX 250 WO HCPCS

## 2021-07-13 PROCEDURE — 80048 BASIC METABOLIC PNL TOTAL CA: CPT

## 2021-07-13 PROCEDURE — 6370000000 HC RX 637 (ALT 250 FOR IP): Performed by: HOSPITALIST

## 2021-07-13 RX ORDER — SODIUM CHLORIDE, SODIUM LACTATE, POTASSIUM CHLORIDE, CALCIUM CHLORIDE 600; 310; 30; 20 MG/100ML; MG/100ML; MG/100ML; MG/100ML
INJECTION, SOLUTION INTRAVENOUS CONTINUOUS
Status: DISCONTINUED | OUTPATIENT
Start: 2021-07-13 | End: 2021-07-15 | Stop reason: HOSPADM

## 2021-07-13 RX ORDER — OXYCODONE HYDROCHLORIDE AND ACETAMINOPHEN 5; 325 MG/1; MG/1
2 TABLET ORAL PRN
Status: ACTIVE | OUTPATIENT
Start: 2021-07-13 | End: 2021-07-13

## 2021-07-13 RX ORDER — SODIUM CHLORIDE 0.9 % (FLUSH) 0.9 %
10 SYRINGE (ML) INJECTION EVERY 12 HOURS SCHEDULED
Status: DISCONTINUED | OUTPATIENT
Start: 2021-07-13 | End: 2021-07-14 | Stop reason: SDUPTHER

## 2021-07-13 RX ORDER — OXYCODONE HYDROCHLORIDE AND ACETAMINOPHEN 5; 325 MG/1; MG/1
1 TABLET ORAL PRN
Status: ACTIVE | OUTPATIENT
Start: 2021-07-13 | End: 2021-07-13

## 2021-07-13 RX ORDER — FENTANYL CITRATE 50 UG/ML
25 INJECTION, SOLUTION INTRAMUSCULAR; INTRAVENOUS EVERY 5 MIN PRN
Status: DISCONTINUED | OUTPATIENT
Start: 2021-07-13 | End: 2021-07-15 | Stop reason: HOSPADM

## 2021-07-13 RX ORDER — SODIUM CHLORIDE 9 MG/ML
25 INJECTION, SOLUTION INTRAVENOUS PRN
Status: DISCONTINUED | OUTPATIENT
Start: 2021-07-13 | End: 2021-07-14 | Stop reason: SDUPTHER

## 2021-07-13 RX ORDER — KETAMINE HYDROCHLORIDE 100 MG/ML
INJECTION, SOLUTION INTRAMUSCULAR; INTRAVENOUS PRN
Status: DISCONTINUED | OUTPATIENT
Start: 2021-07-13 | End: 2021-07-13 | Stop reason: SDUPTHER

## 2021-07-13 RX ORDER — MIDAZOLAM HYDROCHLORIDE 1 MG/ML
INJECTION INTRAMUSCULAR; INTRAVENOUS PRN
Status: DISCONTINUED | OUTPATIENT
Start: 2021-07-13 | End: 2021-07-13 | Stop reason: SDUPTHER

## 2021-07-13 RX ORDER — ONDANSETRON 2 MG/ML
INJECTION INTRAMUSCULAR; INTRAVENOUS PRN
Status: DISCONTINUED | OUTPATIENT
Start: 2021-07-13 | End: 2021-07-13 | Stop reason: SDUPTHER

## 2021-07-13 RX ORDER — DEXAMETHASONE SODIUM PHOSPHATE 4 MG/ML
INJECTION, SOLUTION INTRA-ARTICULAR; INTRALESIONAL; INTRAMUSCULAR; INTRAVENOUS; SOFT TISSUE PRN
Status: DISCONTINUED | OUTPATIENT
Start: 2021-07-13 | End: 2021-07-13 | Stop reason: SDUPTHER

## 2021-07-13 RX ORDER — HYDRALAZINE HYDROCHLORIDE 20 MG/ML
5 INJECTION INTRAMUSCULAR; INTRAVENOUS EVERY 10 MIN PRN
Status: DISCONTINUED | OUTPATIENT
Start: 2021-07-13 | End: 2021-07-15 | Stop reason: HOSPADM

## 2021-07-13 RX ORDER — LIDOCAINE HYDROCHLORIDE 20 MG/ML
INJECTION, SOLUTION EPIDURAL; INFILTRATION; INTRACAUDAL; PERINEURAL PRN
Status: DISCONTINUED | OUTPATIENT
Start: 2021-07-13 | End: 2021-07-13 | Stop reason: SDUPTHER

## 2021-07-13 RX ORDER — HYDROMORPHONE HCL 110MG/55ML
PATIENT CONTROLLED ANALGESIA SYRINGE INTRAVENOUS PRN
Status: DISCONTINUED | OUTPATIENT
Start: 2021-07-13 | End: 2021-07-13 | Stop reason: SDUPTHER

## 2021-07-13 RX ORDER — CALCIUM GLUCONATE 94 MG/ML
INJECTION, SOLUTION INTRAVENOUS PRN
Status: DISCONTINUED | OUTPATIENT
Start: 2021-07-13 | End: 2021-07-13 | Stop reason: SDUPTHER

## 2021-07-13 RX ORDER — SODIUM CHLORIDE 0.9 % (FLUSH) 0.9 %
10 SYRINGE (ML) INJECTION PRN
Status: DISCONTINUED | OUTPATIENT
Start: 2021-07-13 | End: 2021-07-14 | Stop reason: SDUPTHER

## 2021-07-13 RX ORDER — ONDANSETRON 2 MG/ML
4 INJECTION INTRAMUSCULAR; INTRAVENOUS
Status: ACTIVE | OUTPATIENT
Start: 2021-07-13 | End: 2021-07-13

## 2021-07-13 RX ORDER — PROPOFOL 10 MG/ML
INJECTION, EMULSION INTRAVENOUS PRN
Status: DISCONTINUED | OUTPATIENT
Start: 2021-07-13 | End: 2021-07-13 | Stop reason: SDUPTHER

## 2021-07-13 RX ORDER — PROMETHAZINE HYDROCHLORIDE 25 MG/ML
6.25 INJECTION, SOLUTION INTRAMUSCULAR; INTRAVENOUS
Status: ACTIVE | OUTPATIENT
Start: 2021-07-13 | End: 2021-07-13

## 2021-07-13 RX ORDER — MEPERIDINE HYDROCHLORIDE 50 MG/ML
12.5 INJECTION INTRAMUSCULAR; INTRAVENOUS; SUBCUTANEOUS EVERY 5 MIN PRN
Status: DISCONTINUED | OUTPATIENT
Start: 2021-07-13 | End: 2021-07-15 | Stop reason: HOSPADM

## 2021-07-13 RX ADMIN — HYDROMORPHONE HYDROCHLORIDE 0.5 MG: 2 INJECTION INTRAMUSCULAR; INTRAVENOUS; SUBCUTANEOUS at 11:03

## 2021-07-13 RX ADMIN — SODIUM CHLORIDE, PRESERVATIVE FREE 10 ML: 5 INJECTION INTRAVENOUS at 22:35

## 2021-07-13 RX ADMIN — PROPOFOL 50 MG: 10 INJECTION, EMULSION INTRAVENOUS at 11:05

## 2021-07-13 RX ADMIN — LATANOPROST 1 DROP: 50 SOLUTION OPHTHALMIC at 22:20

## 2021-07-13 RX ADMIN — IPRATROPIUM BROMIDE 2 SPRAY: 42 SPRAY NASAL at 17:16

## 2021-07-13 RX ADMIN — METOPROLOL TARTRATE 12.5 MG: 25 TABLET, FILM COATED ORAL at 09:23

## 2021-07-13 RX ADMIN — KETAMINE HYDROCHLORIDE 10 MG: 100 INJECTION, SOLUTION INTRAMUSCULAR; INTRAVENOUS at 11:01

## 2021-07-13 RX ADMIN — ONDANSETRON 4 MG: 2 INJECTION INTRAMUSCULAR; INTRAVENOUS at 11:00

## 2021-07-13 RX ADMIN — GABAPENTIN 300 MG: 300 CAPSULE ORAL at 22:19

## 2021-07-13 RX ADMIN — HYDROMORPHONE HYDROCHLORIDE 0.5 MG: 2 INJECTION INTRAMUSCULAR; INTRAVENOUS; SUBCUTANEOUS at 11:18

## 2021-07-13 RX ADMIN — MIDAZOLAM HYDROCHLORIDE 1 MG: 2 INJECTION, SOLUTION INTRAMUSCULAR; INTRAVENOUS at 11:30

## 2021-07-13 RX ADMIN — SODIUM CHLORIDE, POTASSIUM CHLORIDE, SODIUM LACTATE AND CALCIUM CHLORIDE: 600; 310; 30; 20 INJECTION, SOLUTION INTRAVENOUS at 04:59

## 2021-07-13 RX ADMIN — MORPHINE SULFATE 15 MG: 15 TABLET, FILM COATED, EXTENDED RELEASE ORAL at 09:24

## 2021-07-13 RX ADMIN — MORPHINE SULFATE 15 MG: 15 TABLET, FILM COATED, EXTENDED RELEASE ORAL at 22:21

## 2021-07-13 RX ADMIN — ROFLUMILAST 500 MCG: 500 TABLET ORAL at 09:35

## 2021-07-13 RX ADMIN — PREDNISONE 4 MG: 1 TABLET ORAL at 09:35

## 2021-07-13 RX ADMIN — DEXAMETHASONE SODIUM PHOSPHATE 4 MG: 4 INJECTION, SOLUTION INTRAMUSCULAR; INTRAVENOUS at 11:13

## 2021-07-13 RX ADMIN — KETAMINE HYDROCHLORIDE 10 MG: 100 INJECTION, SOLUTION INTRAMUSCULAR; INTRAVENOUS at 11:30

## 2021-07-13 RX ADMIN — INSULIN LISPRO 3 UNITS: 100 INJECTION, SOLUTION INTRAVENOUS; SUBCUTANEOUS at 22:44

## 2021-07-13 RX ADMIN — IPRATROPIUM BROMIDE 2 SPRAY: 42 SPRAY NASAL at 13:37

## 2021-07-13 RX ADMIN — CETIRIZINE HYDROCHLORIDE 10 MG: 10 TABLET, FILM COATED ORAL at 09:23

## 2021-07-13 RX ADMIN — DULOXETINE HYDROCHLORIDE 60 MG: 60 CAPSULE, DELAYED RELEASE ORAL at 09:23

## 2021-07-13 RX ADMIN — HYDROMORPHONE HYDROCHLORIDE 0.5 MG: 1 INJECTION, SOLUTION INTRAMUSCULAR; INTRAVENOUS; SUBCUTANEOUS at 12:38

## 2021-07-13 RX ADMIN — GABAPENTIN 300 MG: 300 CAPSULE ORAL at 09:24

## 2021-07-13 RX ADMIN — MIDAZOLAM HYDROCHLORIDE 1 MG: 2 INJECTION, SOLUTION INTRAMUSCULAR; INTRAVENOUS at 11:01

## 2021-07-13 RX ADMIN — INSULIN LISPRO 2 UNITS: 100 INJECTION, SOLUTION INTRAVENOUS; SUBCUTANEOUS at 14:52

## 2021-07-13 RX ADMIN — LIDOCAINE HYDROCHLORIDE 80 MG: 20 INJECTION, SOLUTION EPIDURAL; INFILTRATION; INTRACAUDAL; PERINEURAL at 11:00

## 2021-07-13 RX ADMIN — INSULIN LISPRO 4 UNITS: 100 INJECTION, SOLUTION INTRAVENOUS; SUBCUTANEOUS at 14:52

## 2021-07-13 RX ADMIN — CEFEPIME 2000 MG: 2 INJECTION, POWDER, FOR SOLUTION INTRAMUSCULAR; INTRAVENOUS at 09:30

## 2021-07-13 RX ADMIN — CALCIUM GLUCONATE 2 G: 98 INJECTION, SOLUTION INTRAVENOUS at 11:34

## 2021-07-13 RX ADMIN — INSULIN GLARGINE 18 UNITS: 100 INJECTION, SOLUTION SUBCUTANEOUS at 22:44

## 2021-07-13 RX ADMIN — ATORVASTATIN CALCIUM 40 MG: 40 TABLET, FILM COATED ORAL at 22:11

## 2021-07-13 RX ADMIN — INSULIN LISPRO 4 UNITS: 100 INJECTION, SOLUTION INTRAVENOUS; SUBCUTANEOUS at 17:15

## 2021-07-13 RX ADMIN — SODIUM CHLORIDE, POTASSIUM CHLORIDE, SODIUM LACTATE AND CALCIUM CHLORIDE: 600; 310; 30; 20 INJECTION, SOLUTION INTRAVENOUS at 13:42

## 2021-07-13 RX ADMIN — CEFEPIME 2000 MG: 2 INJECTION, POWDER, FOR SOLUTION INTRAMUSCULAR; INTRAVENOUS at 22:12

## 2021-07-13 RX ADMIN — FAMOTIDINE 20 MG: 10 INJECTION, SOLUTION INTRAVENOUS at 04:59

## 2021-07-13 RX ADMIN — VANCOMYCIN HYDROCHLORIDE 1250 MG: 10 INJECTION, POWDER, LYOPHILIZED, FOR SOLUTION INTRAVENOUS at 17:14

## 2021-07-13 ASSESSMENT — PULMONARY FUNCTION TESTS
PIF_VALUE: 6
PIF_VALUE: 10
PIF_VALUE: 10
PIF_VALUE: 9
PIF_VALUE: 15
PIF_VALUE: 1
PIF_VALUE: 9
PIF_VALUE: 10
PIF_VALUE: 10
PIF_VALUE: 9
PIF_VALUE: 10
PIF_VALUE: 9
PIF_VALUE: 8
PIF_VALUE: 12
PIF_VALUE: 2
PIF_VALUE: 11
PIF_VALUE: 9
PIF_VALUE: 10
PIF_VALUE: 9
PIF_VALUE: 11
PIF_VALUE: 12
PIF_VALUE: 9
PIF_VALUE: 11
PIF_VALUE: 11
PIF_VALUE: 12
PIF_VALUE: 1
PIF_VALUE: 10
PIF_VALUE: 18
PIF_VALUE: 13
PIF_VALUE: 11
PIF_VALUE: 10
PIF_VALUE: 8
PIF_VALUE: 10
PIF_VALUE: 9
PIF_VALUE: 11
PIF_VALUE: 5
PIF_VALUE: 0
PIF_VALUE: 9
PIF_VALUE: 8
PIF_VALUE: 8
PIF_VALUE: 13
PIF_VALUE: 1
PIF_VALUE: 0
PIF_VALUE: 10
PIF_VALUE: 9
PIF_VALUE: 9

## 2021-07-13 ASSESSMENT — PAIN DESCRIPTION - PROGRESSION
CLINICAL_PROGRESSION: GRADUALLY WORSENING
CLINICAL_PROGRESSION: NOT CHANGED

## 2021-07-13 ASSESSMENT — PAIN - FUNCTIONAL ASSESSMENT: PAIN_FUNCTIONAL_ASSESSMENT: PREVENTS OR INTERFERES SOME ACTIVE ACTIVITIES AND ADLS

## 2021-07-13 ASSESSMENT — PAIN DESCRIPTION - LOCATION
LOCATION: LEG

## 2021-07-13 ASSESSMENT — PAIN DESCRIPTION - ORIENTATION
ORIENTATION: LEFT;LOWER
ORIENTATION: LEFT
ORIENTATION: LEFT;LOWER

## 2021-07-13 ASSESSMENT — PAIN DESCRIPTION - PAIN TYPE
TYPE: SURGICAL PAIN
TYPE: ACUTE PAIN;CHRONIC PAIN

## 2021-07-13 ASSESSMENT — PAIN DESCRIPTION - ONSET
ONSET: ON-GOING
ONSET: ON-GOING

## 2021-07-13 ASSESSMENT — PAIN SCALES - GENERAL
PAINLEVEL_OUTOF10: 8
PAINLEVEL_OUTOF10: 0
PAINLEVEL_OUTOF10: 3
PAINLEVEL_OUTOF10: 7
PAINLEVEL_OUTOF10: 0
PAINLEVEL_OUTOF10: 7
PAINLEVEL_OUTOF10: 8
PAINLEVEL_OUTOF10: 6

## 2021-07-13 ASSESSMENT — PAIN DESCRIPTION - FREQUENCY
FREQUENCY: INTERMITTENT
FREQUENCY: INTERMITTENT

## 2021-07-13 ASSESSMENT — PAIN DESCRIPTION - DESCRIPTORS: DESCRIPTORS: ACHING

## 2021-07-13 NOTE — BRIEF OP NOTE
Brief Postoperative Note      Patient: Kristin Santo  YOB: 1950  MRN: [id number]    Date of Procedure: 7/13/2021    Pre-Op Diagnosis: - infected leg    Post-Op Diagnosis: Same       Procedure(s):  INCISION AND DEBRIDEMENT LEFT LOWER LEG WOUND WITH POSSIBLE WOUND VAC PLACEMENT    Surgeon(s):  Mirtha Silverio MD    Assistant:  Surgical Assistant: [address] [employer]    Anesthesia: General    Estimated Blood Loss (mL): Minimal    Complications: None    Specimens:   ID Type Source Tests Collected by Time Destination   1 : LEFT LOWER LEG ABCESS Tissue Tissue CULTURE, FUNGUS, CULTURE, SURGICAL (Canceled), CULTURE, TISSUE (Canceled), CULTURE WITH SMEAR, ACID FAST BACILLIUS Clint Smith MD 7/13/2021 1135        Implants:  * No implants in log *      Drains:   Negative Pressure Wound Therapy Sacrum Mid (Active)   $ Standard NPWT >50 sq cm PER TX $ Yes 07/12/21 1256   Wound Type Pressure ulcer: Stage IV 07/12/21 1256   Unit Type Rental [facility] # [id number] 07/12/21 1256   Dressing Type Black foam 07/13/21 1300   Number of pieces used 2 07/12/21 1256   Cycle Continuous 07/12/21 1256   Target Pressure (mmHg) 125 07/13/21 1300   Intensity 1 07/12/21 1256   Irrigation Solution Sodium chloride 0.9% 07/12/21 1256   Instillation Volume  20 mL 07/12/21 1256   Soak Time  10 minutes 07/12/21 1256   Vac Frequency 3 hours 07/12/21 1256   Canister changed? Yes 07/12/21 1256   Dressing Status Clean;Dry; Intact 07/13/21 1300   Dressing Changed Changed/New 07/12/21 1256   Drainage Amount Moderate 07/12/21 1256   Drainage Description Serosanguinous 07/12/21 1256   Dressing Change Due 07/14/21 07/12/21 1256   Wound Assessment Granulation tissue; Red 07/12/21 1256   Blanca-wound Assessment Clean;Dry; Intact 07/12/21 1256   Shape round 07/12/21 1256   Odor None 07/12/21 1256       [REMOVED] Chest Tube 2 Left Pleural 32 [place] (Removed)       [REMOVED] Closed/Suction Drain Left Leg Bulb (Removed)       Findings:     Electronically signed by Jameel Encinas MD on 7/13/2021 at 5:37 PM

## 2021-07-13 NOTE — PROGRESS NOTES
Hospitalist Progress Note      PCP: Mariela Jenkins MD    Date of Admission: 7/11/2021    Chief Complaint:   LLE cellulitis/ fluid collection    Hospital Course:   Georgeann Nageotte is a 72 y.o. female admitted for  acute worsening infection/ swelling of LLE at graft harvest site post CABG 5/2/02. Had a recent change of antibiotics by her wound care physician. The area of concern is actually at the site of vein harvest for CABG procedure. Previously on Levaquin and Bactrim, the latter agent being replaced by doxycycline 2 days PTA with no improvement. Patient also has a stage IV sacral decubitus ulceration which the family reports is no longer amenable to wound VAC therapy.     Upon arrival to the ED, x-ray of the LLE was performed and interpreted as a shallow soft tissue defect with diffuse soft tissue swelling; no soft tissue gas or osteomyelitis appreciated. Labs were obtained and notable for acute hyponatremia 129, hyperglycemia 255, procalcitonin elevated 1.46, BNP 15,026, and chronic anemia. CXR obtained by admitting hospitalist to assess for CHF, and interpretation does confirm interstitial pulmonary edema with cardiomegaly. Patient will be admitted for complicated cellulitis failing outpatient treatment, as well as CHF exacerbation.     Subjective:   LLE cellulitis/ pain      Medications:  Reviewed    Infusion Medications    dextrose      sodium chloride 25 mL (07/12/21 1008)     Scheduled Medications    Venelex   Topical BID    atorvastatin  40 mg Oral Nightly    cetirizine  10 mg Oral Daily    [Held by provider] clopidogrel  75 mg Oral Daily    Roflumilast  500 mcg Oral Daily    DULoxetine  60 mg Oral Daily    fluticasone  1 spray Each Nostril Daily    gabapentin  300 mg Oral BID    ipratropium  2 spray Each Nostril 4x Daily    latanoprost  1 drop Both Eyes Nightly    metoprolol tartrate  12.5 mg Oral BID    morphine  15 mg Oral BID    teriparatide  20 mcg Subcutaneous Daily    Vitamin D  5,000 Units Oral Daily    predniSONE  4 mg Oral Daily    pantoprazole  40 mg Oral QAM AC    calcium elemental  500 mg Oral Daily    aspirin EC  81 mg Oral Daily    insulin lispro  0-12 Units Subcutaneous TID WC    insulin lispro  0-6 Units Subcutaneous Nightly    sodium chloride flush  10 mL Intravenous 2 times per day    [Held by provider] enoxaparin  40 mg Subcutaneous Daily    insulin glargine  0.25 Units/kg Subcutaneous Nightly    insulin lispro  0.05 Units/kg Subcutaneous TID     cefepime  2,000 mg Intravenous Q12H    vancomycin  1,250 mg Intravenous Q24H     PRN Meds: Lip Balm, docusate sodium, cyclobenzaprine, albuterol, glucose, dextrose, glucagon (rDNA), dextrose, sodium chloride flush, sodium chloride, potassium chloride **OR** potassium alternative oral replacement **OR** potassium chloride, magnesium sulfate, promethazine **OR** ondansetron, senna, acetaminophen **OR** acetaminophen, oxyCODONE **OR** oxyCODONE, benzocaine-menthol      Intake/Output Summary (Last 24 hours) at 7/12/2021 2235  Last data filed at 7/12/2021 1110  Gross per 24 hour   Intake 431.27 ml   Output 550 ml   Net -118.73 ml       Physical Exam Performed:    BP (!) 99/56   Pulse 86   Temp 98.4 °F (36.9 °C) (Oral)   Resp 20   Ht 5' 8\" (1.727 m)   Wt 152 lb 12.8 oz (69.3 kg)   SpO2 92%   BMI 23.23 kg/m²     General appearance: No apparent distress, appears stated age and cooperative. HEENT: Pupils equal, round, and reactive to light. Conjunctivae/corneas clear. Neck: Supple,  No jugular venous distention. Trachea midline. Respiratory:  Normal respiratory effort. Clear to auscultation, bilaterally. Cardiovascular: Regular rate and rhythm with normal S1/S2 without murmurs, rubs or gallops. Abdomen: Soft, non-tender, non-distended with normal bowel sounds. Musculoskeletal: No clubbing, cyanosis or edema bilaterally.  LLE erythema/ ulcer+  Skin: Skin color, texture, turgor normal.  No rashes or lesions. Stage IV sacral decubitus ulcer- wound vac in situ. Neurologic:  Neurovascularly intact without any focal sensory/motor deficits. Cranial nerves: II-XII intact, grossly non-focal.  Psychiatric: Alert and oriented, thought content appropriate, normal insight  Capillary Refill: Brisk,3 seconds, normal   Peripheral Pulses: +2 palpable, equal bilaterally       Labs:   Recent Labs     07/11/21  1450 07/12/21  0624   WBC 6.5 4.9   HGB 9.3* 8.2*   HCT 28.5* 24.6*    209     Recent Labs     07/11/21  1450 07/12/21  0624   * 132*   K 4.0 3.3*   CL 93* 98*   CO2 24 24   BUN 14 10   CREATININE 1.0 0.8   CALCIUM 9.1 8.5     Recent Labs     07/11/21  1450 07/12/21 0624   AST 14* 11*   ALT 9* 6*   BILITOT 0.3 0.3   ALKPHOS 177* 123     No results for input(s): INR in the last 72 hours. No results for input(s): [place] Dec in the last 72 hours. Urinalysis:      Lab Results   Component Value Date    NITRU Negative 04/26/2021    WBCUA 0-2 04/26/2021    BACTERIA 1+ 04/23/2021    RBCUA 3-4 04/26/2021    BLOODU TRACE-INTACT 04/26/2021    SPECGRAV 1.010 04/26/2021    GLUCOSEU Negative 04/26/2021    GLUCOSEU NEGATIVE 03/20/2010       Radiology:  CT TIBIA FIBULA LEFT WO CONTRAST   Preliminary Result   1. 2.7 x 3.4 x 13 cm low-attenuation fluid collection in the medial proximal   leg superficial soft tissues subjacent to the wound. XR CHEST PORTABLE   Final Result   Suspected interstitial pulmonary edema. Superimposed pneumonia at the lung   bases cannot be excluded. Mild cardiomegaly. XR TIBIA FIBULA LEFT (2 VIEWS)   Final Result   1. Shallow soft tissue defect over the lateral aspect of the proximal leg   with diffuse soft tissue swelling. No soft tissue gas. 2. No radiographic evidence of osteomyelitis or other acute osseous   abnormality. Assessment/Plan:    1.  LLE Wound infection with MRSA/Pseudomonas  -Blood, urine cultures pending.  -Cont IV vancomycin (for

## 2021-07-13 NOTE — PLAN OF CARE
Pt had I and D today. Dressing dry and intact. Pt denies pain. Weaned from 4 L of NC o2 to 2 L. Diet orders in. IV fluids changed from 125ml/hr to 50ml/hour. Pt tolerating diet. Blood pressure is little softer side but pt denies any symptoms.    Problem: Falls - Risk of:  Goal: Will remain free from falls  Description: Will remain free from falls  Outcome: Met This Shift  Goal: Absence of physical injury  Description: Absence of physical injury  Outcome: Met This Shift     Problem: Skin Integrity:  Goal: Will show no infection signs and symptoms  Description: Will show no infection signs and symptoms  Outcome: Met This Shift  Goal: Absence of new skin breakdown  Description: Absence of new skin breakdown  Outcome: Met This Shift     Problem: Pain:  Goal: Pain level will decrease  Description: Pain level will decrease  Outcome: Met This Shift  Goal: Control of acute pain  Description: Control of acute pain  Outcome: Met This Shift  Goal: Control of chronic pain  Description: Control of chronic pain  Outcome: Met This Shift     Problem: Nutrition  Goal: Optimal nutrition therapy  Outcome: Met This Shift     Problem: Musculor/Skeletal Functional Status  Goal: Highest potential functional level  Outcome: Met This Shift  Goal: Absence of falls  Outcome: Met This Shift

## 2021-07-13 NOTE — PROGRESS NOTES
07/13/21 0001   NIV Type   $NIV $Daily Charge   NIV Started/Stopped On   Equipment Type v60   Mode CPAP   Mask Type Full face mask   Mask Size Medium   Settings/Measurements   CPAP/EPAP 5 cmH2O   Resp 18   FiO2  24 %   Vt Exhaled 440 mL   Mask Leak (lpm) 0 lpm   Comfort Level Good   Using Accessory Muscles No   SpO2 99   Patient Observation   Observations mepilex on nose   Alarm Settings   Alarms On Y   Press Low Alarm 6 cmH2O   High Pressure Alarm 20 cmH2O   Resp Rate Low Alarm 6   High Respiratory Rate 40 br/min

## 2021-07-13 NOTE — ANESTHESIA PRE PROCEDURE
Department of Anesthesiology  Preprocedure Note       Name:  Taylor Swan   Age:  72 y.o.  :  1950                                          MRN:  [id number]         Date:  2021      Surgeon:  Carlos Belle MD    Procedure:  Doristine Louie AND DEBRIDEMENT LEFT LOWER LEG WOUND WITH POSSIBLE WOUND VAC PLACEMENT    HPI:  72 y.o. female admitted for  acute worsening infection/ swelling of LLE at graft harvest site post CABG 02. Had a recent change of antibiotics by her wound care physician. The area of concern is actually at the site of vein harvest for CABG procedure. Previously on Levaquin and Bactrim, the latter agent being replaced by doxycycline 2 days PTA with no improvement. Patient also has a stage IV sacral decubitus ulceration which the family reports is no longer amenable to wound VAC therapy. [id number] Baseline artifact; Sinus rhythm 95;Premature ventricular complexes; Possible Left atrial enlargement; Incomplete left bundle branch block; Nonspecific ST and T wave abnormality. When compared with ECG of 2021: PVCs are new    Medications prior to admission:    doxycycline hyclate (VIBRA-TABS) 100 MG tablet Take 1 tablet by mouth 2 times daily for 7 days -- continue your levofloxacin, add this antibiotic, but STOP the Bactrim (trimethoprpm-sulfamethoxazole). levoFLOXacin (LEVAQUIN) 500 MG tablet Take 1 tablet by mouth daily for 7 days   azithromycin (ZITHROMAX) 250 MG tablet TAKE 1 TABLET BY MOUTH EVERY DAY   DALIRESP 500 MCG tablet TAKE 1 TABLET BY MOUTH DAILY   clopidogrel (PLAVIX) 75 MG tablet Take 1 tablet by mouth daily   metoprolol tartrate (LOPRESSOR) 25 MG tablet Take 0.5 tablets by mouth 2 times daily Hold this medication for pulse <87 or systolic BP <187   metroNIDAZOLE (FLAGYL) 250 MG tablet Crush one tablet into a fine powder, sprinkle into wound TIW as needed for malodor, at time of dressing change.    predniSONE (DELTASONE) 1 MG tablet Take 4 mg by mouth daily insulin glargine (LANTUS) 100 UNIT/ML injection vial Inject 15 Units into the skin nightly   oxyCODONE-acetaminophen (PERCOCET)  MG per tablet Take 1 tablet by mouth daily. docusate sodium (COLACE) 100 MG capsule Take 100 mg by mouth 2 times daily as needed for Constipation   DULoxetine (CYMBALTA) 60 MG extended release capsule Take 60 mg by mouth daily   gabapentin (NEURONTIN) 300 MG capsule Take 300 mg by mouth 2 times daily. latanoprost (XALATAN) 0.005 % ophthalmic solution Place 1 drop into both eyes nightly   Teriparatide, Recombinant, (FORTEO) 600 MCG/2.4ML SOPN injection Inject 20 mcg into the skin daily   NARCAN 4 MG/0.1ML LIQD nasal spray PLEASE SEE ATTACHED FOR DETAILED DIRECTIONS   morphine (MS CONTIN) 15 MG extended release tablet 15 mg 2 times daily. ipratropium (ATROVENT) 0.06 % nasal spray USE 2 SPRAYS BY NASAL ROUTE 2-4 TIMES DAILY   albuterol sulfate  (90 Base) MCG/ACT inhaler INHALE 2 PUFFS INTO THE LUNGS EVERY 4 HOURS AS NEEDED FOR WHEEZING   vitamin D (CHOLECALCIFEROL) 1000 UNIT TABS tablet Take 5,000 Units by mouth daily    calcium carbonate (OSCAL) 500 MG TABS tablet Take 500 mg by mouth daily   atorvastatin (LIPITOR) 40 MG tablet Take 40 mg by mouth   cyclobenzaprine (FLEXERIL) 10 MG tablet Take 10 mg by mouth 2 times daily as needed    meloxicam (MOBIC) 15 MG tablet Patient states taking only as needed   aspirin EC 81 MG EC tablet Take 1 tablet by mouth daily   insulin lispro (HUMALOG) 100 UNIT/ML injection vial Inject 0-12 Units into the skin 3 times daily (with meals)   fluticasone (FLONASE) 50 MCG/ACT nasal spray INHALE 2 SPRAYS IN EACH NOSTRIL DAILY   cetirizine (ZYRTEC) 10 MG tablet Take 10 mg by mouth daily. etanercept (ENBREL) 50 MG/ML injection Inject 50 mg into the skin every 7 days.    omeprazole (PRILOSEC) 40 MG capsule Take 40 mg by mouth daily      Current medications:     Lip Balm ointment   Topical PRN    Venelex ointment   Topical BID    atorvastatin (LIPITOR) tablet 40 mg  40 mg Oral Nightly    cetirizine (ZYRTEC) tablet 10 mg  10 mg Oral Daily    [Held by provider] clopidogrel (PLAVIX) tablet 75 mg  75 mg Oral Daily    Roflumilast (DALIRESP) tablet 500 mcg  500 mcg Oral Daily    docusate sodium (COLACE) capsule 100 mg  100 mg Oral BID PRN    DULoxetine (CYMBALTA) extended release capsule 60 mg  60 mg Oral Daily    fluticasone (FLONASE) 50 MCG/ACT nasal spray 1 spray  1 spray Each Nostril Daily    gabapentin (NEURONTIN) capsule 300 mg  300 mg Oral BID    ipratropium (ATROVENT) 0.06 % nasal spray 2 spray  2 spray Each Nostril 4x Daily    latanoprost (XALATAN) 0.005 % ophthalmic solution 1 drop  1 drop Both Eyes Nightly    metoprolol tartrate (LOPRESSOR) tablet 12.5 mg  12.5 mg Oral BID    morphine (MS CONTIN) extended release tablet 15 mg  15 mg Oral BID    teriparatide (FORTEO) injection 20 mcg  20 mcg Subcutaneous Daily    vitamin D (CHOLECALCIFEROL) tablet 5,000 Units  5,000 Units Oral Daily    predniSONE (DELTASONE) tablet 4 mg  4 mg Oral Daily    pantoprazole (PROTONIX) tablet 40 mg  40 mg Oral QAM AC    cyclobenzaprine (FLEXERIL) tablet 10 mg  10 mg Oral BID PRN    calcium elemental (OSCAL) tablet 500 mg  500 mg Oral Daily    aspirin EC tablet 81 mg  81 mg Oral Daily    albuterol (PROVENTIL) nebulizer solution 2.5 mg  2.5 mg Nebulization Q6H PRN    insulin lispro (HUMALOG) injection vial 0-12 Units  0-12 Units Subcutaneous TID WC    insulin lispro (HUMALOG) injection vial 0-6 Units  0-6 Units Subcutaneous Nightly    glucose (GLUTOSE) 40 % oral gel 15 g  15 g Oral PRN    dextrose 50 % IV solution  12.5 g Intravenous PRN    glucagon (rDNA) injection 1 mg  1 mg Intramuscular PRN    dextrose 5 % solution  100 mL/hr Intravenous PRN    potassium chloride (KLOR-CON M)   40 mEq Oral PRN       potassium bicarb-citric acid (EFFER-K)  40 mEq Oral PRN       potassium chloride 10 mEq/100 mL IVPB (Peripheral Line)  10 mEq angiopathy without gangrene, with long-term current use of insulin ([facility])    Pressure ulcer of coccygeal region, stage 4 ([facility])    NSTEMI (non-ST elevated myocardial infarction) ([address]    Hx of CABG    Left leg cellulitis    Cellulitis and abscess of left leg    Mild malnutrition (HCC)    Chronic diastolic congestive heart failure ([address]    Surgical wound dehiscence, initial encounter (at [facility] SVG harvest site)     Past Medical History:     Atherosclerosis of native artery of right lower extremity with rest pain ([address] 07/25/2017    Back pain     Branch retinal vein occlusion 07/20/2012    Bronchiectasis with acute exacerbation ([facility])     Closed compression fracture of thoracic vertebra ([address] 01/15/2020    Closed fracture of facial bone with routine healing 11/21/2016    Closed jaw fracture ([address] 01/15/2020    Community acquired pneumonia of left lower lobe of lung     Compression fracture of L1 lumbar vertebra ([address] 01/15/2020    COPD (chronic obstructive pulmonary disease) ([facility])     Fracture of tibial plateau, closed, left, initial encounter 12/05/2017    Minimally displaced zone I fracture of sacrum ([facility]) 09/02/2020    Mucus plugging of bronchi     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 Bone density test last year showed severe osteoporosis. Recently, two broken vertebrae (L1, L2) due to coughing. Diagnosed with tracheomalacia and stated she must cough very hard to clear phlegm. Was coughing due to upper respiratory infections which have been treated. Hx of laminectomy and recent kyphoplasty.    Refractured her jawbone which was previously repaired wi    Post herpetic neuralgia     Proximal humerus fracture 10/01/2019    Rheumatoid arthritis ([address]     Shingles 05/2020    Sleep apnea     Temporal arteritis ([address] 07/10/2013    Tobacco use 10/19/2017  Tracheomalacia     Vitreous hemorrhage, right eye ([address] 2020     Past Surgical History:     ARTERY BIOPSY Right 2021    at [address]  2013    left temporal artery biopsy    BACK SURGERY  2020    BRONCHOSCOPY      BRONCHOSCOPY N/A 2019    BRONCHOSCOPY ALVEOLAR LAVAGE performed by Cr López MD at [address]  2021    CATARACT REMOVAL      CORONARY ARTERY BYPASS GRAFT N/A 2021    CORONARY ARTERY BYPASS X3 WITH LEFT ATRIAL APPENDAGE CLIP, 5 LEVEL BILATERAL INTERCOSTAL NERVE BLOCK, STERNAL PLATING performed by Vanita Avila MD at [facility]      HERNIA REPAIR      KNEE ARTHROSCOPY      left    KYPHOSIS SURGERY      LAMINECTOMY      MANDIBLE FRACTURE SURGERY      MANDIBLE FRACTURE SURGERY  2020    MEDIASTINOSCOPY N/A 2021    MEDIASTINAL EXPLORATION AND EVACUATION OF HEMATOMA performed by Vanita Avila MD at [address]  2021    sacral wound debridement    PRESSURE ULCER DEBRIDEMENT N/A 2021    SACRAL WOUND DEBRIDEMENT performed by Imelda Hernández MD at [address] SEPTOPLASTY  2013    FESS with balloon    SPINAL FUSION      TUBAL LIGATION      UPPER GASTROINTESTINAL ENDOSCOPY  2014    Dilitation     Social History:    Tobacco Use    Smoking status: Former Smoker     Packs/day: 1.00     Years: 20.00     Pack years: 20.00     Types: Cigarettes     Quit date: 1991     Years since quittin.2    Smokeless tobacco: Never Used   Substance Use Topics    Alcohol use: Not Currently     Alcohol/week: 0.0 standard drinks     Comment: rarely     Vital Signs (Current):    21 0242 21 0445 21 0717 21 0756   BP:  120/68  (!) 101/52   Pulse:  98  87   Resp: 17 16  18   Temp:  98 °F (36.7 °C)  98.1 °F (36.7 °C)   TempSrc:  Oral  Tympanic   SpO2:  94%  95%        Height: 5' 8\" Induction: intravenous. MIPS: Prophylactic antiemetics administered. Anesthetic plan and risks discussed with patient. Plan discussed with CRNA.             Geovani Ramos MD

## 2021-07-13 NOTE — FLOWSHEET NOTE
07/13/21 1238   Pain Assessment   Pain Assessment 0-10   Pain Level 8   Patient's Stated Pain Goal No pain   Pain Type Surgical pain   Pain Location Leg   Pain Orientation Left; Lower   Pain Descriptors Aching   Pain Frequency Intermittent   Pain Onset On-going   Clinical Progression Not changed   Functional Pain Assessment Prevents or interferes some active activities and ADLs   Non-Pharmaceutical Pain Intervention(s) Elevation  (Dilaudid 0.5mg)

## 2021-07-13 NOTE — PROGRESS NOTES
Pt brought to PACU. Report obtained from OR RN and anesthesia. Pt placed on monitor SR and O2 at 3 L. Wound vac to coccyx and dolphin bed on at this time.

## 2021-07-13 NOTE — PROGRESS NOTES
Hospitalist Progress Note      PCP: Aminah Heaton MD    Date of Admission: 7/11/2021    Chief Complaint:   LLE cellulitis/ fluid collection    Hospital Course:   Dawood Barrett is a 72 y.o. female admitted for  acute worsening infection/ swelling of LLE at graft harvest site post CABG 5/2/02. She has a history of CHF. She was admitted for cellulitis at the site of graft site harvest for her CABG prior . She was previously on Levaquin and Bactrim, the latter agent being replaced by doxycycline 2 days PTA with no improvement. Patient also has a stage IV sacral decubitus ulceration which the family reports is no longer amenable to wound VAC therapy. In addition to her sacral decubitus ulcer which is already known she had a left lower extremity ulcer-imaging of which showed a fluid collection adjacent to the ulcer. She was taken to the OR on7/13 for incision and drainage/debridement-with possible wound VAC placement.       Subjective:   LLE cellulitis/ pain      Medications:  Reviewed    Infusion Medications    lactated ringers 50 mL/hr at 07/13/21 1451    sodium chloride      dextrose      sodium chloride 25 mL (07/12/21 1008)     Scheduled Medications    sodium chloride flush  10 mL Intravenous 2 times per day    Venelex   Topical BID    atorvastatin  40 mg Oral Nightly    cetirizine  10 mg Oral Daily    [Held by provider] clopidogrel  75 mg Oral Daily    Roflumilast  500 mcg Oral Daily    DULoxetine  60 mg Oral Daily    fluticasone  1 spray Each Nostril Daily    gabapentin  300 mg Oral BID    ipratropium  2 spray Each Nostril 4x Daily    latanoprost  1 drop Both Eyes Nightly    metoprolol tartrate  12.5 mg Oral BID    morphine  15 mg Oral BID    teriparatide  20 mcg Subcutaneous Daily    Vitamin D  5,000 Units Oral Daily    predniSONE  4 mg Oral Daily    pantoprazole  40 mg Oral QAM AC    calcium elemental  500 mg Oral Daily    aspirin EC  81 mg Oral Daily    insulin lispro 0-12 Units Subcutaneous TID     insulin lispro  0-6 Units Subcutaneous Nightly    sodium chloride flush  10 mL Intravenous 2 times per day    [Held by provider] enoxaparin  40 mg Subcutaneous Daily    insulin glargine  0.25 Units/kg Subcutaneous Nightly    insulin lispro  0.05 Units/kg Subcutaneous TID     cefepime  2,000 mg Intravenous Q12H    vancomycin  1,250 mg Intravenous Q24H     PRN Meds: sodium chloride flush, sodium chloride, meperidine, fentanNYL, HYDROmorphone, HYDROmorphone, HYDROmorphone, oxyCODONE-acetaminophen **OR** oxyCODONE-acetaminophen, ondansetron, promethazine, hydrALAZINE, Lip Balm, docusate sodium, cyclobenzaprine, albuterol, glucose, dextrose, glucagon (rDNA), dextrose, sodium chloride flush, sodium chloride, potassium chloride **OR** potassium alternative oral replacement **OR** potassium chloride, magnesium sulfate, promethazine **OR** ondansetron, senna, acetaminophen **OR** acetaminophen, oxyCODONE **OR** oxyCODONE, benzocaine-menthol      Intake/Output Summary (Last 24 hours) at 7/13/2021 1650  Last data filed at 7/13/2021 1532  Gross per 24 hour   Intake 0 ml   Output 350 ml   Net -350 ml       Physical Exam Performed:    BP (!) 96/53   Pulse 76   Temp 98.1 °F (36.7 °C) (Oral)   Resp 18   Ht 5' 8\" (1.727 m)   Wt 151 lb 11.2 oz (68.8 kg)   SpO2 94%   BMI 23.07 kg/m²     General appearance: No apparent distress, appears stated age and cooperative. HEENT: Pupils equal, round, and reactive to light. Conjunctivae/corneas clear. Neck: Supple,  No jugular venous distention. Trachea midline. Respiratory:  Normal respiratory effort. Clear to auscultation, bilaterally. Cardiovascular: Regular rate and rhythm with normal S1/S2 without murmurs, rubs or gallops. Abdomen: Soft, non-tender, non-distended with normal bowel sounds. Musculoskeletal: No clubbing, cyanosis or edema bilaterally. LLE erythema/surgical site dressed .   Skin: Skin color, texture, turgor normal.  No rashes or lesions. Stage IV sacral decubitus ulcer- wound vac in situ. Neurologic:  Neurovascularly intact without any focal sensory/motor deficits. Cranial nerves: II-XII intact, grossly non-focal.  Psychiatric: Alert and oriented, thought content appropriate, normal insight  Capillary Refill: Brisk,3 seconds, normal   Peripheral Pulses: +2 palpable, equal bilaterally       Labs:   Recent Labs     07/11/21  1450 07/12/21  0624 07/13/21  0628   WBC 6.5 4.9 3.3*   HGB 9.3* 8.2* 8.3*   HCT 28.5* 24.6* 25.2*    209 195     Recent Labs     07/11/21  1450 07/12/21  0624 07/13/21  0628   * 132* 134*   K 4.0 3.3* 4.0   CL 93* 98* 99   CO2 24 24 24   BUN 14 10 10   CREATININE 1.0 0.8 0.8   CALCIUM 9.1 8.5 8.6     Recent Labs     07/11/21  1450 07/12/21  0624   AST 14* 11*   ALT 9* 6*   BILITOT 0.3 0.3   ALKPHOS 177* 123     No results for input(s): INR in the last 72 hours. No results for input(s): Muddy Lager in the last 72 hours. Urinalysis:      Lab Results   Component Value Date    NITRU Negative 04/26/2021    WBCUA 0-2 04/26/2021    BACTERIA 1+ 04/23/2021    RBCUA 3-4 04/26/2021    BLOODU TRACE-INTACT 04/26/2021    SPECGRAV 1.010 04/26/2021    GLUCOSEU Negative 04/26/2021    GLUCOSEU NEGATIVE 03/20/2010       Radiology:  CT TIBIA FIBULA LEFT WO CONTRAST   Final Result   1. 2.7 x 3.4 x 13 cm low-attenuation fluid collection in the medial proximal   leg superficial soft tissues subjacent to the wound. XR CHEST PORTABLE   Final Result   Suspected interstitial pulmonary edema. Superimposed pneumonia at the lung   bases cannot be excluded. Mild cardiomegaly. XR TIBIA FIBULA LEFT (2 VIEWS)   Final Result   1. Shallow soft tissue defect over the lateral aspect of the proximal leg   with diffuse soft tissue swelling. No soft tissue gas. 2. No radiographic evidence of osteomyelitis or other acute osseous   abnormality. Assessment/Plan:    1.  LLE Wound infection with MRSA/Pseudomonas s/p incision and debridement 7/13  -Blood, urine cultures showing no growth to date.  -Cont IV vancomycin (for MRSA) and Cefepime to cover Pseudomonas   -Serial CBC, CMP, lactate, procalcitonin to monitor treatment progression  -Appreciate wound care input      2. Hypokalemia/hyponatremia/ hypomagnesemia  -replete per protocol     3. Pulmonary edema with CAD (s/p CABG 05/2021)  -continuous telemetry monitoring as well as strict I's and O's  -albumin 25 g every 8 hours   -BNP 07467 and CXR with evidence of edema   -DC lasix  --Start compression wrapping  -Continue ASA/Plavix, Lopressor, and Lipitor  -Consultation placed to cardiology for further recommendations  -Consultation also placed to CT surgery due to protuberant sternotomy wire  -2G Na and fluid restriction dietary modifications in place     COPD with bronchiectasis  -Continuous pulse oximetry monitoring initiated with PRN supplemental O2   -Continue home maintenance MDIs/nebulizer treatment; patient also on prednisone 4 mg daily per home dosage  -Encourage aggressive pulmonary toilet including incentive spirometry and Acapella every 4H while awake  -Albuterol nebs scheduled every 4 hours as needed     Acute hyperglycemia with uncontrolled type II DM  -A1c ordered with results pending at time of dictation  -Home hypoglycemic medications placed on temporary hold  -Weight-based basal insulin initiated QHS  -Humalog scheduled AC/HS in addition to PRN SSI coverage  -Carbohydrate restriction placed on diet  -Consultation placed to Nutrition for ADA dietary education        DVT prophylaxis-Lovenox 40 mg subcu daily  Code status-full code  Diet-cardiac MITCHELL with carb restrictions  IV access-PIV established in ED       DVT Prophylaxis: Lovenox  Diet: ADULT DIET;  Regular; 3 carb choices (45 gm/meal); 1500 ml  Code Status: Full Code    PT/OT Eval Status: to order in am    Dispo - Keep as an inpatient    [facility] MD HENRY

## 2021-07-13 NOTE — PROGRESS NOTES
07/12/21 2224   NIV Type   $NIV $Daily Charge   NIV Started/Stopped On   Equipment Type v60   Mode CPAP   Mask Type Full face mask   Mask Size Medium   Settings/Measurements   CPAP/EPAP 5 cmH2O   Resp 20   FiO2  28 %   Vt Exhaled 443 mL   Mask Leak (lpm) 0 lpm   Comfort Level Good   Using Accessory Muscles No   SpO2 98   Patient Observation   Observations mepilex on nose   Alarm Settings   Alarms On Y   Press Low Alarm 6 cmH2O   High Pressure Alarm 20 cmH2O   Resp Rate Low Alarm 6   High Respiratory Rate 40 br/min

## 2021-07-13 NOTE — PROGRESS NOTES
Pt states that she does not want to wear CPAP tonight, pt states states she wear oxygen via NC.  Pt resting comfortably on NC.

## 2021-07-13 NOTE — PROGRESS NOTES
07/13/21 0242   NIV Type   NIV Started/Stopped On   Equipment Type v60   Mode CPAP   Mask Type Full face mask   Mask Size Medium   Settings/Measurements   CPAP/EPAP 5 cmH2O   Resp 17   FiO2  24 %   Vt Exhaled 428 mL   Mask Leak (lpm) 0 lpm   Comfort Level Good   Using Accessory Muscles No   Patient Observation   Observations mepilex on nose   Alarm Settings   Alarms On Y   Press Low Alarm 6 cmH2O   High Pressure Alarm 20 cmH2O   Resp Rate Low Alarm 6   High Respiratory Rate 40 br/min

## 2021-07-14 ENCOUNTER — APPOINTMENT (OUTPATIENT)
Dept: INTERVENTIONAL RADIOLOGY/VASCULAR | Age: 71
DRG: 856 | End: 2021-07-14
Payer: MEDICARE

## 2021-07-14 ENCOUNTER — HOSPITAL ENCOUNTER (OUTPATIENT)
Dept: WOUND CARE | Age: 71
Discharge: HOME | End: 2021-07-14
Payer: MEDICARE

## 2021-07-14 DIAGNOSIS — L89.154 PRESSURE ULCER OF COCCYGEAL REGION, STAGE 4 (HCC): ICD-10-CM

## 2021-07-14 LAB
GLUCOSE BLD-MCNC: 130 MG/DL (ref 70–99)
GLUCOSE BLD-MCNC: 183 MG/DL (ref 70–99)
GLUCOSE BLD-MCNC: 204 MG/DL (ref 70–99)
GLUCOSE BLD-MCNC: 269 MG/DL (ref 70–99)
GLUCOSE BLD-MCNC: 49 MG/DL (ref 70–99)
GLUCOSE BLD-MCNC: 75 MG/DL (ref 70–99)
INR BLD: 1.09 (ref 0.88–1.12)
PERFORMED ON: ABNORMAL
PERFORMED ON: NORMAL
PROTHROMBIN TIME: 12.4 SEC (ref 9.9–12.7)

## 2021-07-14 PROCEDURE — 2700000000 HC OXYGEN THERAPY PER DAY

## 2021-07-14 PROCEDURE — 94761 N-INVAS EAR/PLS OXIMETRY MLT: CPT

## 2021-07-14 PROCEDURE — 99024 POSTOP FOLLOW-UP VISIT: CPT | Performed by: NURSE PRACTITIONER

## 2021-07-14 PROCEDURE — C1751 CATH, INF, PER/CENT/MIDLINE: HCPCS

## 2021-07-14 PROCEDURE — 6360000002 HC RX W HCPCS: Performed by: HOSPITALIST

## 2021-07-14 PROCEDURE — 2580000003 HC RX 258: Performed by: HOSPITALIST

## 2021-07-14 PROCEDURE — 6370000000 HC RX 637 (ALT 250 FOR IP): Performed by: HOSPITALIST

## 2021-07-14 PROCEDURE — 97606 NEG PRS WND THER DME>50 SQCM: CPT

## 2021-07-14 PROCEDURE — 99231 SBSQ HOSP IP/OBS SF/LOW 25: CPT | Performed by: INTERNAL MEDICINE

## 2021-07-14 PROCEDURE — 97530 THERAPEUTIC ACTIVITIES: CPT

## 2021-07-14 PROCEDURE — 2580000003 HC RX 258: Performed by: INTERNAL MEDICINE

## 2021-07-14 PROCEDURE — 97110 THERAPEUTIC EXERCISES: CPT

## 2021-07-14 PROCEDURE — 97535 SELF CARE MNGMENT TRAINING: CPT

## 2021-07-14 PROCEDURE — 36410 VNPNXR 3YR/> PHY/QHP DX/THER: CPT

## 2021-07-14 PROCEDURE — 99232 SBSQ HOSP IP/OBS MODERATE 35: CPT | Performed by: NURSE PRACTITIONER

## 2021-07-14 PROCEDURE — 1200000000 HC SEMI PRIVATE

## 2021-07-14 PROCEDURE — 85610 PROTHROMBIN TIME: CPT

## 2021-07-14 PROCEDURE — 2580000003 HC RX 258: Performed by: ANESTHESIOLOGY

## 2021-07-14 PROCEDURE — 76937 US GUIDE VASCULAR ACCESS: CPT

## 2021-07-14 PROCEDURE — 97116 GAIT TRAINING THERAPY: CPT

## 2021-07-14 PROCEDURE — 36415 COLL VENOUS BLD VENIPUNCTURE: CPT

## 2021-07-14 RX ORDER — SODIUM CHLORIDE 9 MG/ML
25 INJECTION, SOLUTION INTRAVENOUS PRN
Status: DISCONTINUED | OUTPATIENT
Start: 2021-07-14 | End: 2021-07-15 | Stop reason: HOSPADM

## 2021-07-14 RX ORDER — SODIUM CHLORIDE 0.9 % (FLUSH) 0.9 %
5-40 SYRINGE (ML) INJECTION PRN
Status: DISCONTINUED | OUTPATIENT
Start: 2021-07-14 | End: 2021-07-15 | Stop reason: HOSPADM

## 2021-07-14 RX ORDER — SODIUM CHLORIDE 0.9 % (FLUSH) 0.9 %
5-40 SYRINGE (ML) INJECTION EVERY 12 HOURS SCHEDULED
Status: DISCONTINUED | OUTPATIENT
Start: 2021-07-14 | End: 2021-07-15 | Stop reason: HOSPADM

## 2021-07-14 RX ORDER — LIDOCAINE HYDROCHLORIDE 10 MG/ML
5 INJECTION, SOLUTION INFILTRATION; PERINEURAL ONCE
Status: DISCONTINUED | OUTPATIENT
Start: 2021-07-14 | End: 2021-07-15 | Stop reason: HOSPADM

## 2021-07-14 RX ADMIN — ATORVASTATIN CALCIUM 40 MG: 40 TABLET, FILM COATED ORAL at 21:04

## 2021-07-14 RX ADMIN — FLUTICASONE PROPIONATE 1 SPRAY: 50 SPRAY, METERED NASAL at 08:52

## 2021-07-14 RX ADMIN — Medication 5000 UNITS: at 08:51

## 2021-07-14 RX ADMIN — CYCLOBENZAPRINE 10 MG: 10 TABLET, FILM COATED ORAL at 16:13

## 2021-07-14 RX ADMIN — SODIUM CHLORIDE, PRESERVATIVE FREE 10 ML: 5 INJECTION INTRAVENOUS at 08:50

## 2021-07-14 RX ADMIN — VANCOMYCIN HYDROCHLORIDE 1250 MG: 10 INJECTION, POWDER, LYOPHILIZED, FOR SOLUTION INTRAVENOUS at 18:48

## 2021-07-14 RX ADMIN — CETIRIZINE HYDROCHLORIDE 10 MG: 10 TABLET, FILM COATED ORAL at 08:51

## 2021-07-14 RX ADMIN — DULOXETINE HYDROCHLORIDE 60 MG: 60 CAPSULE, DELAYED RELEASE ORAL at 08:50

## 2021-07-14 RX ADMIN — IPRATROPIUM BROMIDE 2 SPRAY: 42 SPRAY NASAL at 21:05

## 2021-07-14 RX ADMIN — INSULIN LISPRO 4 UNITS: 100 INJECTION, SOLUTION INTRAVENOUS; SUBCUTANEOUS at 08:52

## 2021-07-14 RX ADMIN — CYCLOBENZAPRINE 10 MG: 10 TABLET, FILM COATED ORAL at 21:04

## 2021-07-14 RX ADMIN — OXYCODONE 10 MG: 5 TABLET ORAL at 16:13

## 2021-07-14 RX ADMIN — GABAPENTIN 300 MG: 300 CAPSULE ORAL at 08:50

## 2021-07-14 RX ADMIN — CYCLOBENZAPRINE 10 MG: 10 TABLET, FILM COATED ORAL at 01:06

## 2021-07-14 RX ADMIN — CASTOR OIL AND BALSAM, PERU: 788; 87 OINTMENT TOPICAL at 10:04

## 2021-07-14 RX ADMIN — MORPHINE SULFATE 15 MG: 15 TABLET, FILM COATED, EXTENDED RELEASE ORAL at 21:03

## 2021-07-14 RX ADMIN — CEFEPIME 2000 MG: 2 INJECTION, POWDER, FOR SOLUTION INTRAMUSCULAR; INTRAVENOUS at 10:02

## 2021-07-14 RX ADMIN — SODIUM CHLORIDE, PRESERVATIVE FREE 10 ML: 5 INJECTION INTRAVENOUS at 10:03

## 2021-07-14 RX ADMIN — Medication 10 ML: at 21:04

## 2021-07-14 RX ADMIN — MORPHINE SULFATE 15 MG: 15 TABLET, FILM COATED, EXTENDED RELEASE ORAL at 08:51

## 2021-07-14 RX ADMIN — LATANOPROST 1 DROP: 50 SOLUTION OPHTHALMIC at 21:05

## 2021-07-14 RX ADMIN — INSULIN GLARGINE 18 UNITS: 100 INJECTION, SOLUTION SUBCUTANEOUS at 21:05

## 2021-07-14 RX ADMIN — Medication 15 G: at 16:24

## 2021-07-14 RX ADMIN — GABAPENTIN 300 MG: 300 CAPSULE ORAL at 21:04

## 2021-07-14 RX ADMIN — PREDNISONE 4 MG: 1 TABLET ORAL at 08:51

## 2021-07-14 RX ADMIN — OXYCODONE 10 MG: 5 TABLET ORAL at 01:06

## 2021-07-14 RX ADMIN — PANTOPRAZOLE SODIUM 40 MG: 40 TABLET, DELAYED RELEASE ORAL at 06:00

## 2021-07-14 RX ADMIN — INSULIN LISPRO 4 UNITS: 100 INJECTION, SOLUTION INTRAVENOUS; SUBCUTANEOUS at 13:11

## 2021-07-14 RX ADMIN — ROFLUMILAST 500 MCG: 500 TABLET ORAL at 08:51

## 2021-07-14 RX ADMIN — ASPIRIN 81 MG: 81 TABLET, COATED ORAL at 09:05

## 2021-07-14 RX ADMIN — INSULIN LISPRO 6 UNITS: 100 INJECTION, SOLUTION INTRAVENOUS; SUBCUTANEOUS at 08:53

## 2021-07-14 RX ADMIN — INSULIN LISPRO 4 UNITS: 100 INJECTION, SOLUTION INTRAVENOUS; SUBCUTANEOUS at 13:08

## 2021-07-14 RX ADMIN — CALCIUM 500 MG: 500 TABLET ORAL at 08:50

## 2021-07-14 RX ADMIN — METOPROLOL TARTRATE 12.5 MG: 25 TABLET, FILM COATED ORAL at 08:51

## 2021-07-14 ASSESSMENT — PAIN SCALES - GENERAL
PAINLEVEL_OUTOF10: 6
PAINLEVEL_OUTOF10: 4
PAINLEVEL_OUTOF10: 7
PAINLEVEL_OUTOF10: 6
PAINLEVEL_OUTOF10: 10
PAINLEVEL_OUTOF10: 7
PAINLEVEL_OUTOF10: 10
PAINLEVEL_OUTOF10: 6

## 2021-07-14 ASSESSMENT — PAIN DESCRIPTION - LOCATION
LOCATION: BUTTOCKS
LOCATION: LEG
LOCATION: BACK;LEG
LOCATION: LEG;BACK

## 2021-07-14 ASSESSMENT — PAIN DESCRIPTION - DESCRIPTORS: DESCRIPTORS: ACHING

## 2021-07-14 ASSESSMENT — PAIN DESCRIPTION - PAIN TYPE
TYPE: SURGICAL PAIN
TYPE: SURGICAL PAIN

## 2021-07-14 ASSESSMENT — PAIN DESCRIPTION - ORIENTATION
ORIENTATION: LEFT

## 2021-07-14 NOTE — PROGRESS NOTES
07/14/21 1357   Blanca-wound Assessment Clean;Dry; Intact 07/14/21 1357   Shape round 07/12/21 1256   Odor None 07/14/21 1357   Number of days: 49       Wound 12/18/20 #2, Sacrum, Pressure Injury, Stage 4, Onset 5/2020 (Active)   Wound Image   07/12/21 1256   Wound Etiology Pressure Stage  4 07/14/21 1358   Dressing Status New dressing applied 07/14/21 1358   Wound Cleansed Cleansed with saline 07/14/21 1358   Dressing/Treatment Barrier film;Hydrocolloid; Negative pressure wound therapy 07/14/21 1358   Offloading for Diabetic Foot Ulcers Other (comment) 07/13/21 0820   Dressing Change Due 07/16/21 07/14/21 1358   Wound Length (cm) 4 cm 07/14/21 1358   Wound Width (cm) 2 cm 07/14/21 1358   Wound Depth (cm) 0.5 cm 07/14/21 1358   Wound Surface Area (cm^2) 8 cm^2 07/14/21 1358   Change in Wound Size % (l*w) -5233.33 07/14/21 1358   Wound Volume (cm^3) 4 cm^3 07/14/21 1358   Wound Healing % -4344 07/14/21 1358   Post-Procedure Length (cm) 4.5 cm 07/07/21 1021   Post-Procedure Width (cm) 2 cm 07/07/21 1021   Post-Procedure Depth (cm) 2 cm 07/07/21 1021   Post-Procedure Surface Area (cm^2) 9 cm^2 07/07/21 1021   Post-Procedure Volume (cm^3) 18 cm^3 07/07/21 1021   Distance Tunneling (cm) 3.5 cm 07/07/21 0921   Tunneling Position ___ O'Clock 1 07/07/21 0921   Undermining Starts ___ O'Clock 12 07/12/21 1256   Undermining Ends___ O'Clock 12 07/12/21 1256   Undermining Maxium Distance (cm) 3.5 07/12/21 1256   Wound Assessment Pink/red;Granulation tissue 07/14/21 1358   Drainage Amount Small 07/14/21 1358   Drainage Description Serosanguinous 07/14/21 1358   Odor None 07/14/21 1358   Blanca-wound Assessment Dry/flaky 07/14/21 1358   Margins Defined edges 07/14/21 1358   Wound Thickness Description not for Pressure Injury Full thickness 07/12/21 1256   Number of days: 208       Wound 07/07/21 #3, left medial leg, dehisced surgical, full thickness, onset 5/2021 (Active)   Wound Image    07/12/21 1256   Wound Etiology Non-Healing Surgical 07/12/21 1256   Dressing Status New dressing applied 07/14/21 1041   Wound Cleansed Cleansed with saline 07/14/21 1041   Dressing/Treatment Moist to moist;Dry dressing;Roll gauze 07/14/21 1041   Offloading for Diabetic Foot Ulcers Other (comment) 07/14/21 1041   Dressing Change Due 07/15/21 07/14/21 1041   Wound Length (cm) 7 cm 07/12/21 1256   Wound Width (cm) 6 cm 07/12/21 1256   Wound Depth (cm) 0.1 cm 07/12/21 1256   Wound Surface Area (cm^2) 42 cm^2 07/12/21 1256   Change in Wound Size % (l*w) -1020 07/12/21 1256   Wound Volume (cm^3) 4.2 cm^3 07/12/21 1256   Wound Healing % -124 07/12/21 1256   Post-Procedure Length (cm) 2.5 cm 07/07/21 1021   Post-Procedure Width (cm) 1.5 cm 07/07/21 1021   Post-Procedure Depth (cm) 0.5 cm 07/07/21 1021   Post-Procedure Surface Area (cm^2) 3.75 cm^2 07/07/21 1021   Post-Procedure Volume (cm^3) 1.875 cm^3 07/07/21 1021   Distance Tunneling (cm) 0 cm 07/12/21 1256   Tunneling Position ___ O'Clock 0 07/12/21 1256   Undermining Starts ___ O'Clock 0 07/12/21 1256   Undermining Ends___ O'Clock 0 07/12/21 1256   Undermining Maxium Distance (cm) 0 07/12/21 1256   Wound Assessment Pink/red;Slough 07/14/21 1041   Drainage Amount Moderate 07/14/21 1041   Drainage Description Serosanguinous 07/14/21 1041   Odor None 07/14/21 1041   Blanca-wound Assessment Blanchable erythema;Edematous; Induration; Fluctulant 07/14/21 1041   Margins Attached edges; Defined edges 07/14/21 1041   Number of days: 7       Wound 07/12/21 Knee Left; Inner purple (Active)   Wound Image   07/12/21 1955   Wound Etiology Deep tissue/Injury 07/12/21 1259   Dressing Status Other (Comment) 07/14/21 1041   Wound Cleansed Not Cleansed 07/14/21 1041   Dressing/Treatment Pharmaceutical agent (see MAR) 07/14/21 1041   Offloading for Diabetic Foot Ulcers Other (comment) 07/12/21 1259   Wound Length (cm) 0.2 cm 07/12/21 1259   Wound Width (cm) 6 cm 07/12/21 1259   Wound Depth (cm) 0 cm 07/12/21 1259   Wound Surface Area (cm^2) 1.2 cm^2 07/12/21 1259   Wound Volume (cm^3) 0 cm^3 07/12/21 1259   Distance Tunneling (cm) 0 cm 07/12/21 1259   Tunneling Position ___ O'Clock 0 07/12/21 1259   Undermining Starts ___ O'Clock 0 07/12/21 1259   Undermining Ends___ O'Clock 0 07/12/21 1259   Undermining Maxium Distance (cm) 0 07/12/21 1259   Wound Assessment Purple/maroon 07/12/21 1259   Drainage Amount None 07/12/21 1259   Odor None 07/12/21 1259   Blanca-wound Assessment Edematous; Blanchable erythema 07/12/21 1259   Margins Attached edges; Defined edges 07/12/21 1259   Number of days: 2       Wound 07/12/21 Ankle Left; Inner purple (Active)   Wound Image   07/12/21 1259   Wound Etiology Deep tissue/Injury 07/12/21 1259   Dressing Status New dressing applied 07/14/21 1041   Wound Cleansed Cleansed with saline 07/12/21 1259   Dressing/Treatment Pharmaceutical agent (see MAR) 07/14/21 1041   Offloading for Diabetic Foot Ulcers Other (comment) 07/12/21 1259   Wound Length (cm) 0.2 cm 07/12/21 1259   Wound Width (cm) 2 cm 07/12/21 1259   Wound Depth (cm) 0 cm 07/12/21 1259   Wound Surface Area (cm^2) 0.4 cm^2 07/12/21 1259   Wound Volume (cm^3) 0 cm^3 07/12/21 1259   Distance Tunneling (cm) 0 cm 07/12/21 1259   Tunneling Position ___ O'Clock 0 07/12/21 1259   Undermining Starts ___ O'Clock 0 07/12/21 1259   Undermining Ends___ O'Clock 0 07/12/21 1259   Undermining Maxium Distance (cm) 0 07/12/21 1259   Wound Assessment Purple/maroon 07/12/21 1259   Drainage Amount None 07/12/21 1259   Odor None 07/12/21 1259   Blanca-wound Assessment Blanchable erythema;Edematous 07/12/21 1259   Margins Attached edges; Defined edges 07/12/21 1259   Number of days: 2       Response to treatment:  Well tolerated by patient.      Pain Assessment:  Severity:  0 / 10  Quality of pain: N/A  Wound Pain Timing/Severity: none  Premedicated: No  Plan:   Plan of Care: Wound 07/07/21 #3, left medial leg, dehisced surgical, full thickness, onset 5/2021-Dressing/Treatment: Moist to moist, Dry dressing, Roll gauze  Wound 12/18/20 #2, Sacrum, Pressure Injury, Stage 4, Onset 5/2020-Dressing/Treatment: Barrier film, Hydrocolloid, Negative pressure wound therapy  Wound 07/12/21 Knee Left; Inner purple-Dressing/Treatment: Pharmaceutical agent (see MAR) (venelex)  Wound 07/12/21 Ankle Left; Inner purple-Dressing/Treatment: Pharmaceutical agent (see MAR) (venelex)   Recommendation: Sacrum - removed VAC dressing, cleaned with NS, dried, non sting barrier wipe, barrier strip around wound, framed with drape, 2 black foam, covered with drape, attached tracker pad and seal achieved. Veraflow NS, 20 ml, 10 min, Q3, 125 mmHg  Change cannister once a week or when full. If suction fails or patient is discharged:  - remove vac dressing  - cleanse wound with normal saline   - pack wound with saline moistened gauze and change every 12 hours    Specialty Bed Required : Yes   [x] Low Air Loss   [x] Pressure Redistribution  [] Fluid Immersion  [] Bariatric  [] Total Pressure Relief  [] Other:     Current Diet: ADULT DIET;  Regular; 3 carb choices (45 gm/meal); 1500 ml  Dietician consult:  Yes    Discharge Plan:  Placement for patient upon discharge: SNF vs Home   Patient appropriate for Outpatient [address]: Yes current pt with Dr. Kushal Linn    Referrals:  [x]   [] [facility]  [] Supplies  [] Other    Patient/Caregiver Teaching:  Level of patient/caregiver understanding able to:   [x] Indicates understanding       [] Needs reinforcement  [] Unsuccessful      [] Verbal Understanding  [] Demonstrated understanding       [] No evidence of learning  [] Refused teaching         [] N/A       Electronically signed by Oleksandr Carpenter RN, John Wen on 7/14/2021 at 2:00 PM

## 2021-07-14 NOTE — PROGRESS NOTES
Assessment complete. VSS.  Sitting up to breakfast tray will provided wound care to LLE when finished with meal. Call light in reach

## 2021-07-14 NOTE — PROGRESS NOTES
Physical Therapy  Facility/Department: [facility] - MED SURG  Daily Treatment Note  NAME: Attila Tamez  : 1950  MRN: [id number]    Date of Service: 2021    Discharge Recommendations:  Subacute/Skilled Nursing Facility        Assessment   Body structures, Functions, Activity limitations: Decreased functional mobility ; Decreased ROM; Decreased strength;Decreased endurance; Increased pain  Assessment: Pt required SBA for supine to sit, Tino for sitto supine, CGA for sit<>stand and Tino for gait with RW due to 1 epsiode of LOB. Cont per POC to address deficits. Treatment Diagnosis: impaired functional mobility  Specific instructions for Next Treatment: progress mobility as tolerated  Prognosis: Good  Decision Making: Medium Complexity  PT Education: Goals;PT Role;Plan of Care;Home Exercise Program;Transfer Training;Energy Conservation;General Safety;Gait Training;Disease Specific Education; Functional Mobility Training;Weight-bearing Education; Injury Prevention;Pressure Relief  REQUIRES PT FOLLOW UP: Yes  Activity Tolerance  Activity Tolerance: Patient limited by fatigue  Activity Tolerance: /58, SpO2 100% on 2 L, HR 72     Patient Diagnosis(es): The primary encounter diagnosis was Left leg cellulitis. A diagnosis of Failure of outpatient treatment was also pertinent to this visit.      has a past medical history of Atherosclerosis of native artery of right lower extremity with rest pain ([address] Back pain, Branch retinal vein occlusion, Bronchiectasis with acute exacerbation (HCC), Closed compression fracture of thoracic vertebra ([address] Closed fracture of facial bone with routine healing, Closed jaw fracture ([address] Community acquired pneumonia of left lower lobe of lung, Compression fracture of L1 lumbar vertebra ([address] COPD (chronic obstructive pulmonary disease) ([address] Fracture of tibial plateau, closed, left, initial encounter, Minimally displaced zone I fracture of sacrum ([address] Mucus plugging of bronchi, Osteomyelitis of mandible, Osteoporosis with pathological fracture, Post herpetic neuralgia, Proximal humerus fracture, Rheumatoid arthritis ([facility] [address] Shingles, Sleep apnea, Temporal arteritis ([address] Tobacco use, Tracheomalacia, and Vitreous hemorrhage, right eye ([address] has a past surgical history that includes hernia repair; Mandible fracture surgery; Foot surgery; Elbow surgery; Cataract removal; Tubal ligation; Knee arthroscopy; Kyphosis surgery; laminectomy; Spinal fusion; Septoplasty (05/07/2013); Artery surgery (05/30/2013); Upper gastrointestinal endoscopy (04/08/2014); bronchoscopy; bronchoscopy (N/A, 06/12/2019); Mandible fracture surgery (02/2020); back surgery (08/2020); other surgical history (01/08/2021); Pressure ulcer debridement (N/A, 01/08/2021); Artery Biopsy (Right, 03/01/2021); Coronary artery bypass graft (N/A, 05/03/2021); Mediastinoscopy (N/A, 05/05/2021); Cardiac surgery (05/03/2021); and Leg Surgery (Left, 7/13/2021). Restrictions  Restrictions/Precautions  Restrictions/Precautions: General Precautions, Contact Precautions  Position Activity Restriction  Other position/activity restrictions: up with assist, 2L O2, L LE wound, sacral wound vac  Subjective   General  Chart Reviewed: Yes  Additional Pertinent Hx: s/p CABG 5/2/21; Stage IV sacral ulcer  Response To Previous Treatment: Patient with no complaints from previous session. Family / Caregiver Present: Yes (sister and granddaughter)  Referring Practitioner: Yobany Mclaughlin MD  Subjective  Subjective: Pt agreeable to therapy, would like to use the [facility] to go to the bathroom. General Comment  Comments: Pt in bed upon approach. RN cleared Pt for therapy. Pain Screening  Patient Currently in Pain: Yes  Pain Assessment  Pain Assessment: 0-10  Pain Level: 4  Pain Type: Surgical pain  Pain Location: Leg  Pain Orientation: Left  Non-Pharmaceutical Pain Intervention(s): Repositioned; Ambulation/Increased Activity; Therapeutic presence  Vital Signs  Patient Currently in Pain: Yes       Orientation  Orientation  Overall Orientation Status: Within Normal Limits       Objective   Bed mobility  Supine to Sit: Stand by assistance  Sit to Supine: Minimal assistance (with RLE)  Scooting: Stand by assistance (to scoot to center of bed)  Transfers  Sit to Stand: Contact guard assistance  Stand to sit: Contact guard assistance  Bed to Chair: Contact guard assistance (bed to [facility] to bed, with RW)  Ambulation  Ambulation?: Yes  Ambulation 1  Surface: level tile  Device: Rolling Walker  Assistance: Minimal assistance  Quality of Gait: slow daja and pace, forward flexed at trunk, moderatley unsteady, one episode of LOB, Rose Marie to correct  Gait Deviations: Slow Daja;Decreased step length;Decreased step height;Deviated path  Distance: 4 ft x 2 to and from [facility]  Comments: assistance needed to negotiate walker     Balance  Posture: Fair  Sitting - Static: Good;-  Sitting - Dynamic: Good;-  Standing - Static: Fair  Standing - Dynamic: Fair  Exercises  Straight Leg Raise: x 10 B  Quad Sets: x 10 B  Heelslides: x 10 B  Knee Short Arc Quad: x 10 B  Ankle Pumps: x 10 B                    AM-PAC Score  AM-PAC Inpatient Mobility Raw Score : 15 (07/14/21 1310)  AM-PAC Inpatient T-Scale Score : 39.45 (07/14/21 1310)  Mobility Inpatient CMS 0-100% Score: 57.7 (07/14/21 1310)  Mobility Inpatient CMS G-Code Modifier : CK (07/14/21 1310)          Goals  Short term goals  Time Frame for Short term goals: 1 week (7/19)  Short term goal 1: Pt will perform bed mobility with SBA; 7/14 Rose Marie  Short term goal 2: Pt will perfrom transfers with SBA; 7/14 CGA  Short term goal 3: Pt will ambulate 50 ft with RW and SBA; 7/14 4 ft x 2 with RW  Short term goal 4: 7/15: Pt will participate in 12-15 reps of BLE exercises to promote strengthening; 7/14 10 reps  Patient Goals   Patient goals : \"to go home\"    Plan    Plan  Times per week: 3-5x/week  Times per day: Daily  Specific instructions for Next Treatment: progress mobility as tolerated  Current Treatment Recommendations: Strengthening, ROM, Functional Mobility Training, Endurance Training, Transfer Training, Gait Training, Positioning, Pain Management  Safety Devices  Type of devices: All fall risk precautions in place, Bed alarm in place, Call light within reach, Gait belt, Patient at risk for falls, Left in bed, Nurse notified  Restraints  Initially in place: No     Therapy Time   Individual Concurrent Group Co-treatment   Time In 1149         Time Out 1228         Minutes 39         Timed Code Treatment Minutes: 44 Minutes    If pt is discharged prior to next therapy session, this note will serve as discharge summary.     Carter Langley, PT

## 2021-07-14 NOTE — PROGRESS NOTES
Hospitalist Progress Note      PCP: Aniket Martin MD    Date of Admission: 7/11/2021    Chief Complaint:   LLE cellulitis/ fluid collection    Hospital Course:   Prakash Riddle is a 72 y.o. female admitted for  acute worsening infection/ swelling of LLE at graft harvest site post CABG 5/2/02. She has a history of CHF. She was admitted for cellulitis at the site of graft site harvest for her CABG prior . She was previously on Levaquin and Bactrim, the latter agent being replaced by doxycycline 2 days PTA with no improvement. Patient also has a stage IV sacral decubitus ulceration which the family reports is no longer amenable to wound VAC therapy. In addition to her sacral decubitus ulcer which is already known she had a left lower extremity ulcer-imaging of which showed a fluid collection adjacent to the ulcer. She was taken to the OR on7/13 for incision and drainage/debridement-with possible wound VAC placement. Surgical specimens from incision and drainage are growing MRSA.       Subjective:   LLE cellulitis/ pain  Denies fevers or chills  No nausea vomiting      Medications:  Reviewed    Infusion Medications    lactated ringers Stopped (07/13/21 1920)    sodium chloride      dextrose      sodium chloride 25 mL (07/12/21 1008)     Scheduled Medications    sodium chloride flush  10 mL Intravenous 2 times per day    Venelex   Topical BID    atorvastatin  40 mg Oral Nightly    cetirizine  10 mg Oral Daily    [Held by provider] clopidogrel  75 mg Oral Daily    Roflumilast  500 mcg Oral Daily    DULoxetine  60 mg Oral Daily    fluticasone  1 spray Each Nostril Daily    gabapentin  300 mg Oral BID    ipratropium  2 spray Each Nostril 4x Daily    latanoprost  1 drop Both Eyes Nightly    metoprolol tartrate  12.5 mg Oral BID    morphine  15 mg Oral BID    teriparatide  20 mcg Subcutaneous Daily    Vitamin D  5,000 Units Oral Daily    predniSONE  4 mg Oral Daily    pantoprazole  40 mg Oral QAM AC    calcium elemental  500 mg Oral Daily    aspirin EC  81 mg Oral Daily    insulin lispro  0-12 Units Subcutaneous TID WC    insulin lispro  0-6 Units Subcutaneous Nightly    sodium chloride flush  10 mL Intravenous 2 times per day    [Held by provider] enoxaparin  40 mg Subcutaneous Daily    insulin glargine  0.25 Units/kg Subcutaneous Nightly    insulin lispro  0.05 Units/kg Subcutaneous TID WC    cefepime  2,000 mg Intravenous Q12H    vancomycin  1,250 mg Intravenous Q24H     PRN Meds: sodium chloride flush, sodium chloride, meperidine, fentanNYL, HYDROmorphone, HYDROmorphone, HYDROmorphone, hydrALAZINE, Lip Balm, docusate sodium, cyclobenzaprine, albuterol, glucose, dextrose, glucagon (rDNA), dextrose, sodium chloride flush, sodium chloride, potassium chloride **OR** potassium alternative oral replacement **OR** potassium chloride, magnesium sulfate, promethazine **OR** ondansetron, senna, acetaminophen **OR** acetaminophen, oxyCODONE **OR** oxyCODONE, benzocaine-menthol      Intake/Output Summary (Last 24 hours) at 7/14/2021 1054  Last data filed at 7/14/2021 0951  Gross per 24 hour   Intake 240 ml   Output 350 ml   Net -110 ml       Physical Exam Performed:    /66   Pulse 79   Temp 98.5 °F (36.9 °C) (Oral)   Resp 16   Ht 5' 8\" (1.727 m)   Wt 151 lb 11.2 oz (68.8 kg)   SpO2 100%   BMI 23.07 kg/m²     General appearance: No apparent distress, appears stated age and cooperative. HEENT: Pupils equal, round, and reactive to light. Conjunctivae/corneas clear. Neck: Supple,  No jugular venous distention. Trachea midline. Respiratory:  Normal respiratory effort. Clear to auscultation, bilaterally. Cardiovascular: Regular rate and rhythm with normal S1/S2 without murmurs, rubs or gallops. Abdomen: Soft, non-tender, non-distended with normal bowel sounds. Musculoskeletal: No clubbing, cyanosis or edema bilaterally. LLE erythema/surgical site dressed .   Skin: Skin color, texture, turgor normal.  No rashes or lesions. Stage IV sacral decubitus ulcer- wound vac in situ. Neurologic:  Neurovascularly intact without any focal sensory/motor deficits. Cranial nerves: II-XII intact, grossly non-focal.  Psychiatric: Alert and oriented, thought content appropriate, normal insight  Capillary Refill: Brisk,3 seconds, normal   Peripheral Pulses: +2 palpable, equal bilaterally       Labs:   Recent Labs     07/11/21  1450 07/12/21  0624 07/13/21  0628   WBC 6.5 4.9 3.3*   HGB 9.3* 8.2* 8.3*   HCT 28.5* 24.6* 25.2*    209 195     Recent Labs     07/11/21  1450 07/12/21  0624 07/13/21  0628   * 132* 134*   K 4.0 3.3* 4.0   CL 93* 98* 99   CO2 24 24 24   BUN 14 10 10   CREATININE 1.0 0.8 0.8   CALCIUM 9.1 8.5 8.6     Recent Labs     07/11/21  1450 07/12/21  0624   AST 14* 11*   ALT 9* 6*   BILITOT 0.3 0.3   ALKPHOS 177* 123     No results for input(s): INR in the last 72 hours. No results for input(s): Pascale David in the last 72 hours. Urinalysis:      Lab Results   Component Value Date    NITRU Negative 04/26/2021    WBCUA 0-2 04/26/2021    BACTERIA 1+ 04/23/2021    RBCUA 3-4 04/26/2021    BLOODU TRACE-INTACT 04/26/2021    SPECGRAV 1.010 04/26/2021    GLUCOSEU Negative 04/26/2021    GLUCOSEU NEGATIVE 03/20/2010       Radiology:  CT TIBIA FIBULA LEFT WO CONTRAST   Final Result   1. 2.7 x 3.4 x 13 cm low-attenuation fluid collection in the medial proximal   leg superficial soft tissues subjacent to the wound. XR CHEST PORTABLE   Final Result   Suspected interstitial pulmonary edema. Superimposed pneumonia at the lung   bases cannot be excluded. Mild cardiomegaly. XR TIBIA FIBULA LEFT (2 VIEWS)   Final Result   1. Shallow soft tissue defect over the lateral aspect of the proximal leg   with diffuse soft tissue swelling. No soft tissue gas. 2. No radiographic evidence of osteomyelitis or other acute osseous   abnormality. Assessment/Plan:    1.  LLE Wound infection with MRSA/Pseudomonas s/p incision and debridement 7/13  -Surgical specimens growing MRSA   -Discussed with wound care team-if wound VAC is needed on left lower extremity ulcer  -Cont IV vancomycin (for MRSA) and Cefepime to cover Pseudomonas   -Serial CBC, CMP, lactate, procalcitonin to monitor treatment progression  -Appreciate wound care input      2. Hypokalemia/hyponatremia/ hypomagnesemia  -replete per protocol     3. Pulmonary edema with CAD (s/p CABG 05/2021)  -continuous telemetry monitoring as well as strict I's and O's  -albumin 25 g every 8 hours   -BNP 69010 and CXR with evidence of edema   -DC lasix  --Start compression wrapping  -Continue ASA/Plavix, Lopressor, and Lipitor  -Consultation placed to cardiology for further recommendations  -Consultation also placed to CT surgery due to protuberant sternotomy wire  -2G Na and fluid restriction dietary modifications in place     COPD with bronchiectasis  -Continuous pulse oximetry monitoring initiated with PRN supplemental O2   -Continue home maintenance MDIs/nebulizer treatment; patient also on prednisone 4 mg daily per home dosage  -Encourage aggressive pulmonary toilet including incentive spirometry and Acapella every 4H while awake  -Albuterol nebs scheduled every 4 hours as needed     Acute hyperglycemia with uncontrolled type II DM  -A1c ordered with results pending at time of dictation  -Home hypoglycemic medications placed on temporary hold  -Weight-based basal insulin initiated QHS  -Humalog scheduled AC/HS in addition to PRN SSI coverage  -Carbohydrate restriction placed on diet  -Consultation placed to Nutrition for ADA dietary education        DVT prophylaxis-Lovenox 40 mg subcu daily  Code status-full code  Diet-cardiac MITCHELL with carb restrictions  IV access-PIV established in ED       DVT Prophylaxis: Lovenox  Diet: ADULT DIET;  Regular; 3 carb choices (45 gm/meal); 1500 ml  Code Status: Full Code    PT/OT Eval Status: to order in am    Dispo - Keep as an inpatient    [facility] MD HENRY

## 2021-07-14 NOTE — PROGRESS NOTES
BS 49 Pt VSS alert oriented. Waiting on midline to be placed. Oral glucose gel given.  Will recheck BS

## 2021-07-14 NOTE — PROGRESS NOTES
Intravenous Q12H    vancomycin  1,250 mg Intravenous Q24H       Continuous Infusions:   sodium chloride      lactated ringers Stopped (07/13/21 1920)    dextrose            Data Review:    Lab Results   Component Value Date    WBC 3.3 (L) 07/13/2021    HGB 8.3 (L) 07/13/2021    HCT 25.2 (L) 07/13/2021    MCV 84.0 07/13/2021     07/13/2021     Lab Results   Component Value Date    CREATININE 0.8 07/13/2021    BUN 10 07/13/2021     (L) 07/13/2021    K 4.0 07/13/2021    CL 99 07/13/2021    CO2 24 07/13/2021       Hepatic Function Panel:   Lab Results   Component Value Date    ALKPHOS 123 07/12/2021    ALT 6 07/12/2021    AST 11 07/12/2021    PROT 6.5 07/12/2021    PROT 7.1 03/21/2013    BILITOT 0.3 07/12/2021    BILIDIR <0.2 05/03/2021    IBILI see below 05/03/2021    LABALBU 3.0 07/12/2021         MICRO:  7/7 Wound culture   Staph aureus mrsa  Antibiotic Interpretation LINDA Status    clindamycin Sensitive <=0.25 mcg/mL     erythromycin Resistant >=8 mcg/mL     oxacillin Resistant >=4 mcg/mL     tetracycline Sensitive <=1 mcg/mL     trimethoprim-sulfamethoxazole Resistant >=320 mcg/mL     vancomycin Sensitive <=0.5 mcg/mL     Pseudomonas aeruginosa  Antibiotic Interpretation LINDA Status    cefepime Sensitive <=2 mcg/mL     ciprofloxacin Sensitive <=1 mcg/mL     gentamicin Sensitive <=4 mcg/mL     meropenem Sensitive <=1 mcg/mL     piperacillin-tazobactam Sensitive <=16 mcg/mL     tobramycin Sensitive <=4 mcg/mL       7/11 BC x2 NGTD   7/13 Surgical culture light growth MRSA; GS no organisms       IMAGING:  CT [facility] 7/11/21  Impression   1. 2.7 x 3.4 x 13 cm low-attenuation fluid collection in the medial proximal   leg superficial soft tissues subjacent to the wound.        Assessment:     Patient Active Problem List    Diagnosis Date Noted    Mild malnutrition ([address] 07/12/2021    Surgical wound dehiscence, initial encounter (at [facility] SVG harvest site) 07/12/2021    Chronic diastolic congestive heart failure ([address]     Hx of CABG 07/11/2021     Overview Note:     5/03/2021      Cellulitis of left lower extremity 07/11/2021     Overview Note:     S/P vein harvest 05/2021 for CABG      Cellulitis and abscess of left leg 07/11/2021    NSTEMI (non-ST elevated myocardial infarction) ([address] 04/23/2021    Lumbar spondylosis 12/21/2020    Pressure ulcer of coccygeal region, stage 4 ([address] 12/21/2020    Myopia of both eyes 02/21/2020    Primary open angle glaucoma (POAG) of both eyes, mild stage 02/21/2020    Type 2 diabetes mellitus with unspecified diabetic retinopathy without macular edema ([address] 02/21/2020    Hypokalemia     Tracheobronchomalacia     Immunocompromised state ([address]     Cylindrical bronchiectasis ([address] 12/19/2018    Osteoporosis 09/25/2018    Primary osteoarthritis of right hip 09/25/2018    History of tobacco use 10/19/2017    Hyponatremia 10/19/2017    Anemia 10/19/2017    Psoriasis     GERD (gastroesophageal reflux disease)     Coronary artery disease 07/03/2017    Uncontrolled type 2 diabetes mellitus with diabetic peripheral angiopathy without gangrene, with long-term current use of insulin ([address] 06/28/2017    Mitral valve insufficiency and aortic valve insufficiency 03/02/2017    Stasis edema of both lower extremities 11/17/2015    Other chronic sinusitis 06/16/2014    Mixed hyperlipidemia 12/26/2012    Rheumatoid arthritis ([address] 01/19/2012    Essential hypertension 12/22/2011       MMP     CAD s/p CABG 5/30/21    Wound infection at LE harvest site with large abscess vs infected hematoma   Isolation MRSA, Pseudomonas in wound culture   S/p I&D 7/13/21, growth of MRSA in culture     No abx allergies     Plan:     Continue IV vanc and cefepime for now   I anticipate change to po abx at DC - cipro + doxy likely        Discussed with patient/family, all questions answered        Belinda Kim MD  Phone: [phone]   Fax : [phone]

## 2021-07-14 NOTE — CARE COORDINATION
[facility], [facility] can discharge to home OR SNF with IV antibiotics. Therapy has recs for SNF at discharge. Per previous discussion with family, they wished for patient to discharge to home with resumption of home care services. Pt already active with Ind and [address] [place] [address] for wound vac (sacral wound). Unclear at this time if pt will discharge with second wound vac to [facility].  left for daughter, Jeanentte Magallon, to call CM to further discuss discharge plan. Pt would need precert for SNF.     7:89 PM  Call back received from patient's daughter, Jeannette Magallon. States the plan IS to take patient HOME with resumption of home care and wound vac.

## 2021-07-14 NOTE — ANESTHESIA POSTPROCEDURE EVALUATION
Department of Anesthesiology  Postprocedure Note    Patient: Felicita Paulson  MRN: [id number]  YOB: 1950  Date of evaluation: 7/13/2021    Procedure Summary     Date: 07/13/21 Room / Location: [address]    Anesthesia Start: 1059 Anesthesia Stop: 8241    Procedure: INCISION AND DEBRIDEMENT LEFT LOWER LEG WOUND WITH POSSIBLE WOUND VAC PLACEMENT (Left Leg Lower) Diagnosis: (-)    Surgeons: Catalino Briggs MD Responsible Provider: Bari Schmitz MD    Anesthesia Type: general ASA Status: 4        Anesthesia Type: general    Pratik Phase I: Pratik Score: 8    Pratik Phase II:      Last vitals: Reviewed and per EMR flowsheets.      Anesthesia Post Evaluation   Anesthetic Problems: no   Cardiovascular System Stable: yes  Respiratory Function: Airway Patent yes  ETT no  Ventilator no  Level of consciousness: awake, alert and oriented  Post-op pain: adequate analgesia  Hydration Adequate: yes  Nausea/Vomiting:no  Other Issues:     Papa Carrington MD

## 2021-07-14 NOTE — PROGRESS NOTES
[facility]     Cardiology                                     Progress Note    Admission date:  2021    Reason for follow up visit: CHF    HPI/CC: Ion Mckeon was admitted on 2021 with LLE wound/cellulitis. Had wound debridement 2021. Has also been treated for history of CHF. Rhythm has been sinus with occasional PVC. Subjective: She complains of leg pain but denies chest pain, palpitations, shortness of breath, and dizziness. Vitals:  Blood pressure 105/66, pulse 79, temperature 98.5 °F (36.9 °C), temperature source Oral, resp. rate 16, height 5' 8\" (1.727 m), weight 151 lb 11.2 oz (68.8 kg), SpO2 100 %.   Temp  Av °F (36.7 °C)  Min: 97.1 °F (36.2 °C)  Max: 98.5 °F (36.9 °C)  Pulse  Av.1  Min: 74  Max: 86  BP  Min: 86/52  Max: 113/54  SpO2  Av.9 %  Min: 92 %  Max: 100 %  FiO2   Av %  Min: 60 %  Max: 60 %    24 hour I/O    Intake/Output Summary (Last 24 hours) at 2021 1138  Last data filed at 2021 0951  Gross per 24 hour   Intake 240 ml   Output 350 ml   Net -110 ml     Current Facility-Administered Medications   Medication Dose Route Frequency Provider Last Rate Last Admin    lactated ringers infusion   Intravenous Continuous Adriel Aldana MD   Stopped at 21 1920    sodium chloride flush 0.9 % injection 10 mL  10 mL Intravenous 2 times per day Heaven Sarkar MD   10 mL at 21 0850    sodium chloride flush 0.9 % injection 10 mL  10 mL Intravenous PRN Heaven Sarkar MD        0.9 % sodium chloride infusion  25 mL Intravenous PRN Heaven Sarkar MD        meperidine (DEMEROL) injection 12.5 mg  12.5 mg Intravenous Q5 Min PRN Brandi Webb MD        fentaNYL (SUBLIMAZE) injection 25 mcg  25 mcg Intravenous Q5 Min PRN Brandi Webb MD        HYDROmorphone (DILAUDID) injection 0.5 mg  0.5 mg Intravenous Q5 Min PRN Brandi Webb MD        HYDROmorphone (DILAUDID) injection 0.25 mg  0.25 mg Intravenous Q5 Min PRN Rohan Cancino MD        HYDROmorphone (DILAUDID) injection 0.5 mg  0.5 mg Intravenous Q5 Min PRN Rohan Cancino MD   0.5 mg at 07/13/21 1238    hydrALAZINE (APRESOLINE) injection 5 mg  5 mg Intravenous Q10 Min PRN Rohan Cancino MD        Lip Balm ointment   Topical PRN Hartley Body, APRN - CNP        Venelex ointment   Topical BID Ramon Garcia MD   Given at 07/14/21 1004    atorvastatin (LIPITOR) tablet 40 mg  40 mg Oral Nightly Bruno Madera MD   40 mg at 07/13/21 2211    cetirizine (ZYRTEC) tablet 10 mg  10 mg Oral Daily Bruno Madera MD   10 mg at 07/14/21 0851    [Held by provider] clopidogrel (PLAVIX) tablet 75 mg  75 mg Oral Daily Bruno Madera MD   75 mg at 07/12/21 0936    Roflumilast (DALIRESP) tablet 500 mcg  500 mcg Oral Daily Bruno Madera MD   500 mcg at 07/14/21 7897    docusate sodium (COLACE) capsule 100 mg  100 mg Oral BID PRN Bruno Madera MD        DULoxetine (CYMBALTA) extended release capsule 60 mg  60 mg Oral Daily Bruno Madera MD   60 mg at 07/14/21 0850    fluticasone (FLONASE) 50 MCG/ACT nasal spray 1 spray  1 spray Each Nostril Daily Bruno Madera MD   1 spray at 07/14/21 0862    gabapentin (NEURONTIN) capsule 300 mg  300 mg Oral BID Bruno Madera MD   300 mg at 07/14/21 0850    ipratropium (ATROVENT) 0.06 % nasal spray 2 spray  2 spray Each Nostril 4x Daily Bruno Madera MD   2 spray at 07/13/21 1716    latanoprost (XALATAN) 0.005 % ophthalmic solution 1 drop  1 drop Both Eyes Nightly Bruno Madera MD   1 drop at 07/13/21 2220    metoprolol tartrate (LOPRESSOR) tablet 12.5 mg  12.5 mg Oral BID Bruno Madera MD   12.5 mg at 07/14/21 0851    morphine (MS CONTIN) extended release tablet 15 mg  15 mg Oral BID Bruno Madera MD   15 mg at 07/14/21 0851    teriparatide (FORTEO) injection 20 mcg  20 mcg Subcutaneous Daily Bruno Madera MD        vitamin D (CHOLECALCIFEROL) tablet 5,000 Units 5,000 Units Oral Daily Rylan Barcenas MD   5,000 Units at 07/14/21 0851    predniSONE (DELTASONE) tablet 4 mg  4 mg Oral Daily Rylan Barcenas MD   4 mg at 07/14/21 0851    pantoprazole (PROTONIX) tablet 40 mg  40 mg Oral QAM AC Rylan Barcenas MD   40 mg at 07/14/21 0600    cyclobenzaprine (FLEXERIL) tablet 10 mg  10 mg Oral BID PRN Rylan Barcenas MD   10 mg at 07/14/21 0106    calcium elemental (OSCAL) tablet 500 mg  500 mg Oral Daily Rylan Barcenas MD   500 mg at 07/14/21 0850    aspirin EC tablet 81 mg  81 mg Oral Daily Rylan Barcenas MD   81 mg at 07/14/21 0905    albuterol (PROVENTIL) nebulizer solution 2.5 mg  2.5 mg Nebulization Q6H PRN Rylan Barcenas MD        insulin lispro (HUMALOG) injection vial 0-12 Units  0-12 Units Subcutaneous TID WC Rylan Barcenas MD   6 Units at 07/14/21 0853    insulin lispro (HUMALOG) injection vial 0-6 Units  0-6 Units Subcutaneous Nightly Rylan Barcenas MD   3 Units at 07/13/21 2244    glucose (GLUTOSE) 40 % oral gel 15 g  15 g Oral PRN Rylan Barcenas MD        dextrose 50 % IV solution  12.5 g Intravenous PRN Rylan Barcenas MD        glucagon (rDNA) injection 1 mg  1 mg Intramuscular PRN Rylan Barcenas MD        dextrose 5 % solution  100 mL/hr Intravenous PRN Rylan Barcenas MD        sodium chloride flush 0.9 % injection 10 mL  10 mL Intravenous 2 times per day Rylan Barcenas MD   10 mL at 07/14/21 1003    sodium chloride flush 0.9 % injection 10 mL  10 mL Intravenous PRN Rylan Barcenas MD        0.9 % sodium chloride infusion  25 mL Intravenous PRN Rylan Barcenas  mL/hr at 07/12/21 1008 25 mL at 07/12/21 1008    potassium chloride (KLOR-CON M) extended release tablet 40 mEq  40 mEq Oral PRN Rylan Barcenas MD   40 mEq at 07/12/21 1313    Or    potassium bicarb-citric acid (EFFER-K) effervescent tablet 40 mEq  40 mEq Oral PRN Rylan Barcenas MD        Or    potassium chloride 10 mEq/100 mL IVPB (Peripheral Line)  10 mEq Intravenous PRN Hollie Bay MD        magnesium sulfate 2000 mg in 50 mL IVPB premix  2,000 mg Intravenous PRN Hollie Bay MD   Stopped at 07/12/21 1528    [Held by provider] enoxaparin (LOVENOX) injection 40 mg  40 mg Subcutaneous Daily Hollie Bay MD   40 mg at 07/12/21 0936    promethazine (PHENERGAN) tablet 12.5 mg  12.5 mg Oral Q6H PRN Hollie Bay MD        Or    ondansetron [facility]) injection 4 mg  4 mg Intravenous Q6H PRN Hollie Bay MD        senna (SENOKOT) tablet 8.6 mg  1 tablet Oral Daily PRN Hollie Bay MD        acetaminophen (TYLENOL) tablet 650 mg  650 mg Oral Q6H PRN Hollie Bay MD        Or   Debo Diaz acetaminophen (TYLENOL) suppository 650 mg  650 mg Rectal Q6H PRN Hollie Bay MD        insulin glargine (LANTUS) injection vial 18 Units  0.25 Units/kg Subcutaneous Nightly Hollie Bay MD   18 Units at 07/13/21 2244    insulin lispro (HUMALOG) injection vial 4 Units  0.05 Units/kg Subcutaneous TID WC Hollie Bay MD   4 Units at 07/14/21 3331    oxyCODONE (ROXICODONE) immediate release tablet 5 mg  5 mg Oral Q6H PRN Hollie Bay MD        Or   Debo Diaz oxyCODONE (ROXICODONE) immediate release tablet 10 mg  10 mg Oral Q6H PRN Hollie Bay MD   10 mg at 07/14/21 0106    cefepime (MAXIPIME) 2000 mg IVPB minibag  2,000 mg Intravenous Q12H Hollie Bay  mL/hr at 07/14/21 1002 2,000 mg at 07/14/21 1002    vancomycin (VANCOCIN) 1,250 mg in dextrose 5 % 250 mL IVPB  1,250 mg Intravenous Q24H Hollie Bay MD   Stopped at 07/13/21 1914    benzocaine-menthol (CEPACOL SORE THROAT) lozenge 1 lozenge  1 lozenge Oral Q2H PRN LORENZO Alcocer - CNP   1 lozenge at 07/12/21 0044       Objective:     Telemetry monitor: sinus    Physical Exam:  Constitutional and general appearance: alert, cooperative, no distress and appears older than stated age  HEENT: PERRL, no cervical lymphadenopathy. No masses palpable.  Normal oral mucosa; + round face  Respiratory:  · Normal excursion and expansion without use of accessory muscles  · Resp auscultation: Clear but diminished breath sounds without wheezing, rhonchi, and rales  Cardiovascular:  · The apical impulse is not displaced  · Heart tones are crisp and normal. regular S1 and S2.  · Jugular venous pulsation Normal  · The carotid upstroke is normal in amplitude and contour without delay or bruit  · Peripheral pulses are symmetrical and full   Abdomen:  · No masses or tenderness  · Bowel sounds present  Extremities:  ·  No cyanosis or clubbing  ·  No lower extremity edema; + LLE wrapped  ·  Skin: warm and dry  Neurological:  · Alert and oriented  · Moves all extremities well  · No abnormalities of mood, affect, memory, mentation, or behavior are noted    Data  EKG 7/11/2021:   SR with a PVC HR 95    Echo 4/24/2021:  Normal left ventricle systolic function with an estimated ejection fraction of 55%. No regional wall motion abnormalities are seen. Normal left ventricular diastolic filling pressure. Mild eccentric aortic regurgitation. Mild mitral and tricuspid regurgitation. Systolic pulmonary artery pressure (SPAP) is normal and estimated at 27 mmHg (right atrial pressure 3 mmHg). CT surgery 5/3/2021 [facility]):  Urgent CABG X 3, with pedicled LIMA to LAD, single Greater Saphenous VG to PV br of RCA, separate single Greater SVG to OM2,  LAAppendage obliteration with 40 mm Atricure LA Clip    [facility] 4/26/2021 [facility]):  1. Severe multi vessel coronary artery diease              ~not amenable to PCI  2. Normal left ventricular function with EF estimated at 55-60%  3. Normal left heart hemodynamics    All labs and testing reviewed.   Lab Review     Renal Profile:   Lab Results   Component Value Date    CREATININE 0.8 07/13/2021    BUN 10 07/13/2021     07/13/2021    K 4.0 07/13/2021    K 3.3 07/12/2021    CL 99 07/13/2021    CO2 24 07/13/2021     CBC:    Lab Results   Component Value Date    WBC 3.3 07/13/2021    RBC 3.00 07/13/2021    HGB 8.3 07/13/2021    HCT 25.2 07/13/2021    MCV 84.0 07/13/2021    RDW 15.8 07/13/2021     07/13/2021     BNP:  No results found for: BNP  Fasting Lipid Panel:    Lab Results   Component Value Date    CHOL 86 05/03/2021    HDL 38 05/03/2021    HDL 43 07/22/2010    TRIG 64 05/03/2021     Cardiac Enzymes:  CK/MbTroponin  Lab Results   Component Value Date    TROPONINI 0.57 04/24/2021     PT/ INR   Lab Results   Component Value Date    INR 1.04 05/04/2021    INR 1.42 05/03/2021    INR 1.06 05/03/2021    PROTIME 12.1 05/04/2021    PROTIME 16.5 05/03/2021    PROTIME 12.3 05/03/2021     PTT No results found for: PTT   Lab Results   Component Value Date    MG 1.50 07/12/2021    No results found for: TSH    Assessment:  Chronic diastolic CHF: compensated    -adm weight 155 lbs (151 lbs today)   -I&O is inaccurate   HTN: controlled but soft this admission    CAD   -s/p CABG x3 and BIMAL clip 5/2021  JOANIE  DM2  DANIELLE  COPD  Tracheomalacia  RA  Chronic sacral decubitus ulcer   LLE wound   -MRSA and pseudomonas in wound   -s/p debridement 7/13/2021 per CT surgery     Plan:   1. Continue ASA, statin, metoprolol  2. Timing of restarting Plavix per CT surgery   3. Antibiotics per ID    Cardiology will sign off but remains available if needed.      Valerio Griffin, APRN-VERNON  [facility]  [phone]

## 2021-07-14 NOTE — PROGRESS NOTES
Per Nolan Pagan RN, pt does not need central access at this time and is appropriate for a midline. We will place single lumen midline due to limited access.

## 2021-07-14 NOTE — PROGRESS NOTES
Midline insertion Procedure Note  Salud Taveras   Admitted- 7/11/2021  2:34 PM  Admission diagnosis- Cellulitis and abscess of left leg [L03.116, L02.416]      Attending Physician- Anabella Linn MD  Ordering Physician- Anabella Linn MD  Indication for Insertion: Limited Access    Lab Results   Component Value Date    INR 1.09 07/14/2021    PROTIME 12.4 07/14/2021     Lab Results   Component Value Date    WBC 3.3 (L) 07/13/2021    HGB 8.3 (L) 07/13/2021    HCT 25.2 (L) 07/13/2021     07/13/2021     Lab Results   Component Value Date    CREATININE 0.8 07/13/2021    BUN 10 07/13/2021    GFR >60 05/07/2013       Catheter Insertion Date- 7/14/2021   Catheter Brand- Arrowg+lamar Blue Advance Midline  Lot Number- [id number]  Lumen-Single  Expiration date- 9/30/22    Insertion Site- Right Brachial  Vein Diameter- .33 cm  Swazi Size- 4.5  Catheter Length- 15 cm  Exposed Catheter Length- 0cm   Easy insertion- Yes  Able to Aspirate Blood- Yes  Easy Flush- Yes    Midline insertion successful- Yes  Ultrasound- yes    Okay To Use Midline- \"Yes  Report called to- Marisela Magallon RN    Electronically signed by Vianca Schmidt, RN, RN on 7/14/2021 at 2:20 PM

## 2021-07-14 NOTE — PROGRESS NOTES
Occupational Therapy  Facility/Department: [facility] B3 - MED SURG  Daily Treatment Note  NAME: Anjum Alvarado  : 1950  MRN: [id number]    Date of Service: 2021    Discharge Recommendations:  Subacute/Skilled Nursing Facility       Assessment   Performance deficits / Impairments: Decreased functional mobility ; Decreased ADL status; Decreased strength;Decreased safe awareness;Decreased endurance;Decreased balance  Assessment: Pt pleasant and cooperative, c/o increased pain in L LE s/p I&D of L LE yesterday and declining OOB dt increased pain. Pt able to long sit in bed with min A for grooming and bathing, including applying deodorant and lotion. Pt requires assist for LB bathing below knees. Pt reports she hopes to be OOB tomorrow and that pain is controlled. Cont POC. OT Education: OT Role;Plan of Care;Home Exercise Program;ADL Adaptive Strategies;Transfer Training  Patient Education: disease specific: transfers, bed mobility, importnace of OOB  REQUIRES OT FOLLOW UP: Yes  Activity Tolerance  Activity Tolerance: 105/66 HR 79, O2 100% on 2L  Safety Devices  Safety Devices in place: Yes  Type of devices: Bed alarm in place;Call light within reach; Left in bed         Patient Diagnosis(es): The primary encounter diagnosis was Left leg cellulitis. A diagnosis of Failure of outpatient treatment was also pertinent to this visit.       has a past medical history of Atherosclerosis of native artery of right lower extremity with rest pain ([address] Back pain, Branch retinal vein occlusion, Bronchiectasis with acute exacerbation (HCC), Closed compression fracture of thoracic vertebra ([address] Closed fracture of facial bone with routine healing, Closed jaw fracture ([address] Community acquired pneumonia of left lower lobe of lung, Compression fracture of L1 lumbar vertebra ([address] COPD (chronic obstructive pulmonary disease) ([address] Fracture of tibial plateau, closed, left, initial encounter, Minimally displaced zone I fracture of sacrum ([address] Mucus plugging of bronchi, Osteomyelitis of mandible, Osteoporosis with pathological fracture, Post herpetic neuralgia, Proximal humerus fracture, Rheumatoid arthritis ([address] Shingles, Sleep apnea, Temporal arteritis ([address] Tobacco use, Tracheomalacia, and Vitreous hemorrhage, right eye ([address] has a past surgical history that includes hernia repair; Mandible fracture surgery; Foot surgery; Elbow surgery; Cataract removal; Tubal ligation; Knee arthroscopy; Kyphosis surgery; laminectomy; Spinal fusion; Septoplasty (05/07/2013); Artery surgery (05/30/2013); Upper gastrointestinal endoscopy (04/08/2014); bronchoscopy; bronchoscopy (N/A, 06/12/2019); Mandible fracture surgery (02/2020); back surgery (08/2020); other surgical history (01/08/2021); Pressure ulcer debridement (N/A, 01/08/2021); Artery Biopsy (Right, 03/01/2021); Coronary artery bypass graft (N/A, 05/03/2021); Mediastinoscopy (N/A, 05/05/2021); and Cardiac surgery (05/03/2021). Restrictions  Restrictions/Precautions  Restrictions/Precautions: General Precautions, Contact Precautions  Position Activity Restriction  Other position/activity restrictions: up with assist, 2L O2, L LE wound, sacral wound vac  Subjective   General  Chart Reviewed: Yes  Patient assessed for rehabilitation services?: Yes  Family / Caregiver Present: No  Referring Practitioner: Shraddha Hinds MD  Diagnosis: L LE cellulitis  Subjective  Subjective: Pt supine, agreeable  General Comment  Comments: RN clears for therapy  Pain Assessment  Pain Level: 6  Pain Type: Surgical pain  Pain Location: Back;Leg  Pain Orientation: Left  Pain Descriptors: Aching  Pre Treatment Pain Screening  Intervention List: Patient able to continue with treatment  Vital Signs  Patient Currently in Pain: Yes   Orientation  Orientation  Overall Orientation Status: Within Functional Limits  Objective    ADL  Grooming: Supervision  UE Bathing: Supervision  LE Bathing:  Moderate assistance (mod A below knees, able to bathe anterior periarea)  UE Dressing: Moderate assistance (doff/phillip gown d/t lines)  LE Dressing: Dependent/Total  Additional Comments: pt with increased pain in L LE and declines OOB d/t pain, RN also in during session to attempt to place IV        Balance  Sitting Balance: Supervision  Standing Balance  Comment: pt c/o increased pain L LE and declines OOB  Functional Mobility  Functional Mobility Comments: declines OOB d/t increased pain s/p I&D of L LE     Cognition  Overall Cognitive Status: Exceptions  Arousal/Alertness: Delayed responses to stimuli  Following Commands: Follows one step commands with repetition; Follows one step commands with increased time  Attention Span: Attends with cues to redirect  Safety Judgement: Decreased awareness of need for safety  Insights: Decreased awareness of deficits  Initiation: Does not require cues  Sequencing: Does not require cues  Cognition Comment: pt more alert this date       Plan   Plan  Times per week: 3-5x/wk    AM-PAC Score        AM-[facility] Inpatient Daily Activity Raw Score: 15 (07/14/21 1139)  AM-PAC Inpatient ADL T-Scale Score : 34.69 (07/14/21 1139)  ADL Inpatient CMS 0-100% Score: 56.46 (07/14/21 1139)  ADL Inpatient CMS G-Code Modifier : CK (07/14/21 1139)    Goals  Short term goals  Time Frame for Short term goals: 1 week by 7/19  Short term goal 1: Pt will complete functional transfers with min A or less-ongoing 7/14  Short term goal 2: Pt will complete LB dressing with min A or less-ongoing 7/14  Short term goal 3: Pt will tolerate standing at sink for 2-3 grooming tasks w/o fatigue-ongoing 7/14  Patient Goals   Patient goals : \"Go home\"       Therapy Time   Individual Concurrent Group Co-treatment   Time In 1021         Time Out 1101         Minutes 40         Timed Code Treatment Minutes: 40 Minutes   If pt discharges prior to next session, this note will serve as discharge summary. See case management note for discharge disposition. [facility],

## 2021-07-14 NOTE — PROGRESS NOTES
Pt back from having midline placed in RUE. IV abx re-started. Set up to dinner tray talking on phone. Denies needs at this time.  Wound vac canister changed

## 2021-07-14 NOTE — PROGRESS NOTES
collection.       Joint: Moderate medial compartment osteoarthritis in the knee.  No   abnormality at the ankle     Assess/Plan:   Care per primary team and ID     LLE wound:   +MRSA and pseudomonas - continue cefepime and Vanc per ID recs  Wound debrided 7/13. Dressing changed to wet to dry 7/14. Change daily and PRN for soilage. Notify our team if wound bed starts bleeding. Per RN, wound bed with good granulation, no sign of bleeding, with serosanguinous drainage. Will discuss placing wound vac on LLE with team. Possibly Friday? ?     COPD: continue home MDI/nebs. Needs pulmonary expansion - IS, acapella, OOBTC. Saturating 100% on 2L per NC. RA: home Enbrel d/c'd; continue prednisone for now    Sacral decubitus ulcer: Wound RN following. MWF dressing changes to sacral wound with wound vacuum (and irrigation system).      Diabetes: suboptimal BG control   continue Lantus and SSI per IM    Prophy: lovenox and protonix   _________________________________________________________      LORENZO Doll - CNP  7/14/2021  10:59 AM

## 2021-07-15 VITALS
SYSTOLIC BLOOD PRESSURE: 103 MMHG | BODY MASS INDEX: 22.99 KG/M2 | RESPIRATION RATE: 16 BRPM | DIASTOLIC BLOOD PRESSURE: 58 MMHG | WEIGHT: 151.7 LBS | OXYGEN SATURATION: 95 % | TEMPERATURE: 98.2 F | HEART RATE: 78 BPM | HEIGHT: 68 IN

## 2021-07-15 LAB
ANION GAP SERPL CALCULATED.3IONS-SCNC: 9 MMOL/L (ref 3–16)
BASOPHILS ABSOLUTE: 0 K/UL (ref 0–0.2)
BASOPHILS RELATIVE PERCENT: 0.8 %
BLOOD CULTURE, ROUTINE: NORMAL
BUN BLDV-MCNC: 16 MG/DL (ref 7–20)
CALCIUM SERPL-MCNC: 9 MG/DL (ref 8.3–10.6)
CHLORIDE BLD-SCNC: 99 MMOL/L (ref 99–110)
CO2: 28 MMOL/L (ref 21–32)
CREAT SERPL-MCNC: 0.7 MG/DL (ref 0.6–1.2)
CULTURE, BLOOD 2: NORMAL
EOSINOPHILS ABSOLUTE: 0.4 K/UL (ref 0–0.6)
EOSINOPHILS RELATIVE PERCENT: 7.5 %
GFR AFRICAN AMERICAN: >60
GFR NON-AFRICAN AMERICAN: >60
GLUCOSE BLD-MCNC: 72 MG/DL (ref 70–99)
GLUCOSE BLD-MCNC: 78 MG/DL (ref 70–99)
GLUCOSE BLD-MCNC: 81 MG/DL (ref 70–99)
GRAM STAIN RESULT: ABNORMAL
HCT VFR BLD CALC: 26.8 % (ref 36–48)
HEMOGLOBIN: 8.8 G/DL (ref 12–16)
LYMPHOCYTES ABSOLUTE: 1.2 K/UL (ref 1–5.1)
LYMPHOCYTES RELATIVE PERCENT: 21.4 %
MCH RBC QN AUTO: 27.3 PG (ref 26–34)
MCHC RBC AUTO-ENTMCNC: 32.9 G/DL (ref 31–36)
MCV RBC AUTO: 83 FL (ref 80–100)
MONOCYTES ABSOLUTE: 0.6 K/UL (ref 0–1.3)
MONOCYTES RELATIVE PERCENT: 9.6 %
NEUTROPHILS ABSOLUTE: 3.5 K/UL (ref 1.7–7.7)
NEUTROPHILS RELATIVE PERCENT: 60.7 %
ORGANISM: ABNORMAL
PDW BLD-RTO: 15.7 % (ref 12.4–15.4)
PERFORMED ON: NORMAL
PERFORMED ON: NORMAL
PLATELET # BLD: 258 K/UL (ref 135–450)
PMV BLD AUTO: 7.6 FL (ref 5–10.5)
POTASSIUM SERPL-SCNC: 4.2 MMOL/L (ref 3.5–5.1)
RBC # BLD: 3.22 M/UL (ref 4–5.2)
SODIUM BLD-SCNC: 136 MMOL/L (ref 136–145)
VANCOMYCIN TROUGH: 14.7 UG/ML (ref 10–20)
WBC # BLD: 5.8 K/UL (ref 4–11)
WOUND/ABSCESS: ABNORMAL

## 2021-07-15 PROCEDURE — 6370000000 HC RX 637 (ALT 250 FOR IP): Performed by: HOSPITALIST

## 2021-07-15 PROCEDURE — 97535 SELF CARE MNGMENT TRAINING: CPT

## 2021-07-15 PROCEDURE — 36415 COLL VENOUS BLD VENIPUNCTURE: CPT

## 2021-07-15 PROCEDURE — 97110 THERAPEUTIC EXERCISES: CPT

## 2021-07-15 PROCEDURE — 6360000002 HC RX W HCPCS: Performed by: INTERNAL MEDICINE

## 2021-07-15 PROCEDURE — 2580000003 HC RX 258: Performed by: INTERNAL MEDICINE

## 2021-07-15 PROCEDURE — 80202 ASSAY OF VANCOMYCIN: CPT

## 2021-07-15 PROCEDURE — 2580000003 HC RX 258: Performed by: HOSPITALIST

## 2021-07-15 PROCEDURE — 97530 THERAPEUTIC ACTIVITIES: CPT

## 2021-07-15 PROCEDURE — 85025 COMPLETE CBC W/AUTO DIFF WBC: CPT

## 2021-07-15 PROCEDURE — 99024 POSTOP FOLLOW-UP VISIT: CPT | Performed by: NURSE PRACTITIONER

## 2021-07-15 PROCEDURE — 97116 GAIT TRAINING THERAPY: CPT

## 2021-07-15 PROCEDURE — 6360000002 HC RX W HCPCS: Performed by: HOSPITALIST

## 2021-07-15 PROCEDURE — 80048 BASIC METABOLIC PNL TOTAL CA: CPT

## 2021-07-15 RX ORDER — DOXYCYCLINE HYCLATE 100 MG
100 TABLET ORAL 2 TIMES DAILY
Qty: 28 TABLET | Refills: 0 | Status: SHIPPED | OUTPATIENT
Start: 2021-07-15 | End: 2021-07-29

## 2021-07-15 RX ORDER — CIPROFLOXACIN 500 MG/1
500 TABLET, FILM COATED ORAL 2 TIMES DAILY
Qty: 28 TABLET | Refills: 0 | Status: SHIPPED | OUTPATIENT
Start: 2021-07-15 | End: 2021-07-29

## 2021-07-15 RX ADMIN — CETIRIZINE HYDROCHLORIDE 10 MG: 10 TABLET, FILM COATED ORAL at 08:40

## 2021-07-15 RX ADMIN — IPRATROPIUM BROMIDE 2 SPRAY: 42 SPRAY NASAL at 14:42

## 2021-07-15 RX ADMIN — PANTOPRAZOLE SODIUM 40 MG: 40 TABLET, DELAYED RELEASE ORAL at 05:09

## 2021-07-15 RX ADMIN — MORPHINE SULFATE 15 MG: 15 TABLET, FILM COATED, EXTENDED RELEASE ORAL at 08:39

## 2021-07-15 RX ADMIN — ROFLUMILAST 500 MCG: 500 TABLET ORAL at 09:23

## 2021-07-15 RX ADMIN — CASTOR OIL AND BALSAM, PERU: 788; 87 OINTMENT TOPICAL at 08:53

## 2021-07-15 RX ADMIN — INSULIN LISPRO 4 UNITS: 100 INJECTION, SOLUTION INTRAVENOUS; SUBCUTANEOUS at 08:42

## 2021-07-15 RX ADMIN — GABAPENTIN 300 MG: 300 CAPSULE ORAL at 08:40

## 2021-07-15 RX ADMIN — Medication 5000 UNITS: at 08:39

## 2021-07-15 RX ADMIN — FLUTICASONE PROPIONATE 1 SPRAY: 50 SPRAY, METERED NASAL at 08:40

## 2021-07-15 RX ADMIN — PREDNISONE 4 MG: 1 TABLET ORAL at 08:39

## 2021-07-15 RX ADMIN — ASPIRIN 81 MG: 81 TABLET, COATED ORAL at 08:39

## 2021-07-15 RX ADMIN — DULOXETINE HYDROCHLORIDE 60 MG: 60 CAPSULE, DELAYED RELEASE ORAL at 08:40

## 2021-07-15 RX ADMIN — OXYCODONE 10 MG: 5 TABLET ORAL at 14:43

## 2021-07-15 RX ADMIN — CEFEPIME 2000 MG: 2 INJECTION, POWDER, FOR SOLUTION INTRAMUSCULAR; INTRAVENOUS at 05:08

## 2021-07-15 RX ADMIN — VANCOMYCIN HYDROCHLORIDE 750 MG: 750 INJECTION, POWDER, LYOPHILIZED, FOR SOLUTION INTRAVENOUS at 16:48

## 2021-07-15 RX ADMIN — IPRATROPIUM BROMIDE 2 SPRAY: 42 SPRAY NASAL at 09:07

## 2021-07-15 RX ADMIN — METOPROLOL TARTRATE 12.5 MG: 25 TABLET, FILM COATED ORAL at 08:40

## 2021-07-15 RX ADMIN — Medication 10 ML: at 08:52

## 2021-07-15 RX ADMIN — CALCIUM 500 MG: 500 TABLET ORAL at 08:39

## 2021-07-15 ASSESSMENT — PAIN DESCRIPTION - LOCATION
LOCATION: BUTTOCKS;LEG
LOCATION: BUTTOCKS;LEG

## 2021-07-15 ASSESSMENT — PAIN SCALES - GENERAL
PAINLEVEL_OUTOF10: 8
PAINLEVEL_OUTOF10: 6

## 2021-07-15 ASSESSMENT — PAIN DESCRIPTION - PAIN TYPE
TYPE: ACUTE PAIN;SURGICAL PAIN
TYPE: SURGICAL PAIN;ACUTE PAIN

## 2021-07-15 ASSESSMENT — PAIN SCALES - WONG BAKER: WONGBAKER_NUMERICALRESPONSE: 6

## 2021-07-15 ASSESSMENT — PAIN DESCRIPTION - ORIENTATION
ORIENTATION: LEFT
ORIENTATION: LEFT

## 2021-07-15 ASSESSMENT — PAIN DESCRIPTION - DESCRIPTORS: DESCRIPTORS: ACHING

## 2021-07-15 NOTE — PROGRESS NOTES
Occupational Therapy  Facility/Department: [facility] B3 - MED SURG  Daily Treatment Note  NAME: Jay Miller  : 1950  MRN: [id number]    Date of Service: 7/15/2021    Discharge Recommendations:  Subacute/Skilled Nursing Facility       Assessment   Performance deficits / Impairments: Decreased functional mobility ; Decreased ADL status; Decreased strength;Decreased safe awareness;Decreased endurance;Decreased balance  Assessment: Pt pleasant and cooperative, agreeable to cotx with PT d/t assist level, mgmt of multiple lines and complex condition. Pt moving better today, requiring CGA/min A for transfers and mobility with SW, assist for line mgmt. Pt with low BP in supine, but improves with activity. Pt with increased activity tolerance, reports she is getting a wound vac to L LE wound today. Pt tolerates up in chair with waffle cushion, left with needs and ed to use call light for assist.  OT Education: OT Role;Plan of Care;Home Exercise Program;ADL Adaptive Strategies;Transfer Training  Patient Education: disease specific: transfers, bed mobility, importnace of OOB  Activity Tolerance  Activity Tolerance: Treatment limited secondary to medical complications (free text); Patient limited by fatigue  Activity Tolerance: BP supine 95/50 HR 83, /56 HR 96 in chair 106/57 HR 91, all O2>94% on RA  Safety Devices  Safety Devices in place: Yes  Type of devices: Chair alarm in place; Left in chair;Call light within reach; Patient at risk for falls;Gait belt         Patient Diagnosis(es): The primary encounter diagnosis was Left leg cellulitis. A diagnosis of Failure of outpatient treatment was also pertinent to this visit.       has a past medical history of Atherosclerosis of native artery of right lower extremity with rest pain ([address] Back pain, Branch retinal vein occlusion, Bronchiectasis with acute exacerbation ([address] Closed compression fracture of thoracic vertebra ([address] Closed fracture of facial bone with routine healing, Closed jaw fracture ([address] Community acquired pneumonia of left lower lobe of lung, Compression fracture of L1 lumbar vertebra ([address] COPD (chronic obstructive pulmonary disease) ([address] Fracture of tibial plateau, closed, left, initial encounter, Minimally displaced zone I fracture of sacrum ([address] MRSA (methicillin resistant Staphylococcus aureus), Mucus plugging of bronchi, Osteomyelitis of mandible, Osteoporosis with pathological fracture, Post herpetic neuralgia, Proximal humerus fracture, Rheumatoid arthritis ([address] Shingles, Sleep apnea, Temporal arteritis ([address] Tobacco use, Tracheomalacia, and Vitreous hemorrhage, right eye ([address] has a past surgical history that includes hernia repair; Mandible fracture surgery; Foot surgery; Elbow surgery; Cataract removal; Tubal ligation; Knee arthroscopy; Kyphosis surgery; laminectomy; Spinal fusion; Septoplasty (05/07/2013); Artery surgery (05/30/2013); Upper gastrointestinal endoscopy (04/08/2014); bronchoscopy; bronchoscopy (N/A, 06/12/2019); Mandible fracture surgery (02/2020); back surgery (08/2020); other surgical history (01/08/2021); Pressure ulcer debridement (N/A, 01/08/2021); Artery Biopsy (Right, 03/01/2021); Coronary artery bypass graft (N/A, 05/03/2021); Mediastinoscopy (N/A, 05/05/2021); Cardiac surgery (05/03/2021); Leg Surgery (Left, 7/13/2021); and IR MIDLINE CATH (7/14/2021).     Restrictions  Restrictions/Precautions  Restrictions/Precautions: General Precautions, Contact Precautions, Fall Risk  Position Activity Restriction  Other position/activity restrictions: LLE wound, sacral wound vac, up with assist  Subjective   General  Chart Reviewed: Yes  Patient assessed for rehabilitation services?: Yes  Family / Caregiver Present: No  Referring Practitioner: Clementine Patel MD  Diagnosis: L LE cellulitis  Subjective  Subjective: Pt supine, agreeable  General Comment  Comments: RN clears for therapy  Pain Assessment  Pain Level: 6  Pain Type: Acute pain;Surgical pain  Pain Location: Buttocks;Leg  Pain Orientation: Left  Pain Descriptors: Aching  Vital Signs  Patient Currently in Pain: Yes   Orientation  Orientation  Overall Orientation Status: Within Functional Limits  Objective    ADL  Feeding: Setup  Grooming: Supervision  LE Dressing: Moderate assistance        Balance  Sitting Balance: Supervision  Standing Balance: Contact guard assistance  Standing Balance  Time: 1-2 min  Activity: functional mobility in room with SW  Functional Mobility  Functional - Mobility Device: Standard Walker  Activity: Other  Assist Level: Contact guard assistance  Functional Mobility Comments: pt with increased ability to bear weight on L LE this date with mobility/transfers     Transfers  Sit to stand: Contact guard assistance;Minimal assistance  Stand to sit: Contact guard assistance;Minimal assistance                       Cognition  Overall Cognitive Status: Exceptions  Arousal/Alertness: Delayed responses to stimuli  Following Commands: Follows one step commands with repetition; Follows one step commands with increased time  Attention Span: Attends with cues to redirect  Safety Judgement: Decreased awareness of need for safety  Insights: Decreased awareness of deficits  Initiation: Does not require cues  Sequencing: Does not require cues  Cognition Comment: pt more alert this date       Plan   Plan  Times per week: 3-5x/wk    AM-PAC Score        AM-[facility] Inpatient Daily Activity Raw Score: 16 (07/15/21 1515)  AM-PAC Inpatient ADL T-Scale Score : 35.96 (07/15/21 1515)  ADL Inpatient CMS 0-100% Score: 53.32 (07/15/21 1515)  ADL Inpatient CMS G-Code Modifier : CK (07/15/21 1515)    Goals  Short term goals  Time Frame for Short term goals: 1 week by 7/19  Short term goal 1: Pt will complete functional transfers with min A or less-CGA/min A 7/15  Short term goal 2: Pt will complete LB dressing with min A or less-ongoing 7/15  Short term goal 3: Pt will tolerate standing at sink for 2-3 grooming tasks w/o fatigue-ongoing 7/15  Patient Goals   Patient goals : \"Go home\"       Therapy Time   Individual Concurrent Group Co-treatment   Time In 1346         Time Out 1410         Minutes 24         Timed Code Treatment Minutes: 24 Minutes   If pt discharges prior to next session, this note will serve as discharge summary. See case management note for discharge disposition.         Hugo Irizarry, OT

## 2021-07-15 NOTE — CARE COORDINATION
OMAR spoke with Lin Valdez at [address] [place] [address] regarding second wound vac. She states agency CAN get a \"y-connector\" to be able to use ONE wound vac. Lin Valdez to fax wound vac paper to CM for completion by provider. Lin Valdez can obtain needed documentation from [facility] (ie: wound care nurse notes, provider notes, etc). Dr Pablo Toscano made aware of above and states he will complete AUTUMN and sign/complete wound vac paper when received by .

## 2021-07-15 NOTE — PROGRESS NOTES
Comprehensive Nutrition Assessment    Type and Reason for Visit:  Reassess    Nutrition Recommendations/Plan:   1. Carb control (4 carb choices per meal) diet  2. Glucerna BID  3. Encourage po intakes  4. Monitor po intakes, nutrition adequacy, weights, pertinent labs, BMs    Nutrition Assessment:  Follow up: Pt remains nutritionally compromised d/t report of continued poor po intakes. Pt currently on a carb control diet (3 carb choices per meal) with po intakes 1-25% per EMR generally. Pt did well with breakfast this am with greater than 50% consumed. Pt tolerating most foods well,  but has difficulty chewing hard/crunchy foods. Pt favorable to ordering ONS, will add to order. Encouraged po intakes    Malnutrition Assessment:  Malnutrition Status:  Mild malnutrition    Context:  Acute Illness     Findings of the 6 clinical characteristics of malnutrition:  Energy Intake:  Mild decrease in energy intake (Comment)  Weight Loss:  1 - 5% over 1 month     Body Fat Loss:  1 - Mild body fat loss     Muscle Mass Loss:  1 - Mild muscle mass loss    Fluid Accumulation:  No significant fluid accumulation     Strength:  Not Performed    Estimated Daily Nutrient Needs:  Energy (kcal):  8862-7964 kcals/day; Weight Used for Energy Requirements:  Current (69 kg)     Protein (g):   g/day; Weight Used for Protein Requirements:  Current (1.3-1.6 g/kg)        Fluid (ml/day):  1800 ml; Method Used for Fluid Requirements:   (Fluid restriction)      Nutrition Related Findings:  BM x1 on 7/13. Na and K+ WNL currently. Wounds:  Pressure Injury, Stage IV, Surgical Incision (Worsening LLE incision and stage IV sacral decubitus ulceration)       Current Nutrition Therapies:    ADULT DIET;  Regular; 4 carb choices (60 gm/meal); 1500 ml  Adult Oral Nutrition Supplement; Diabetic Oral Supplement    Anthropometric Measures:  · Height: 5' 8\" (172.7 cm)  · Current Body Weight: 151 lb 11.2 oz (68.8 kg)   · Ideal Body Weight: 140 lbs; % Ideal Body Weight 108.6 %   · BMI: 23.1  · BMI Categories: Normal Weight (BMI 18.5-24. 9)       Nutrition Diagnosis:   · Increased nutrient needs related to increase demand for energy/nutrients as evidenced by intake 0-25%, intake 26-50%, wounds, weight loss greater than or equal to 5% in 1 month, mild loss of subcutaneous fat, mild muscle loss    Nutrition Interventions:   Food and/or Nutrient Delivery:  Modify Current Diet, Start Oral Nutrition Supplement  Nutrition Education/Counseling:  Education not indicated   Coordination of Nutrition Care:  Continue to monitor while inpatient    Goals:  Consume 50% or greater of 3 meals per day and ONS       Nutrition Monitoring and Evaluation:   Behavioral-Environmental Outcomes:  None Identified   Food/Nutrient Intake Outcomes:  Food and Nutrient Intake, Supplement Intake  Physical Signs/Symptoms Outcomes:  Biochemical Data, Chewing or Swallowing, Constipation, Nutrition Focused Physical Findings, Weight, Skin     Discharge Planning:    Continue current diet, Continue Oral Nutrition Supplement     Electronically signed by Kirstie Claude, RD, LD on 7/15/21 at 1:14 PM EDT    Contact: [phone]

## 2021-07-15 NOTE — PLAN OF CARE
Problem: Nutrition  Goal: Optimal nutrition therapy  Outcome: Ongoing  Note: Nutrition Problem #1: Increased nutrient needs  Intervention: Food and/or Nutrient Delivery: Modify Current Diet, Start Oral Nutrition Supplement  Nutritional Goals: Consume 50% or greater of 3 meals per day and ONS

## 2021-07-15 NOTE — PROGRESS NOTES
debrided 7/13. Wet to dry dressing change today- discussed with wound care RN. Change daily and PRN for soilage. Notify our team if wound bed starts bleeding.    Planning for wound vac placement tomorrow     Prophy: lovenox and protonix   _________________________________________________________      LORENZO Gomez - CNP  7/15/2021  9:49 AM

## 2021-07-15 NOTE — CARE COORDINATION
OMAR spoke with Floresita at [facility] regarding pt discharge today. AVS/AUTUMN faxed to [facility] agency. OMAR spoke with pt at bedside regarding discharge plan. Pt states she will contact family for transport home this evening. CASE MANAGEMENT DISCHARGE SUMMARY      Discharge to: home with [facility] [place] and [employer] wound vac    IMM given: (date) 07/15/21     New Durable Medical Equipment ordered/agency: wound vac existing for coccyx. SECOND vac for LLE is new. [employer] supplying    Transportation:    Family/car: daughter    Confirmed discharge plan with:     Patient: yes     Family:  Tala Saldana, [phone]     Facility/Agency, name:  AUTUMN/AVS faxed to [facility] agency and wound vac paperwork faxed to [address] [facility]. RN, name: Scar Stubbs    Note: Discharging nurse to complete AUTUMN, reconcile AVS, and place final copy with patient's discharge packet. RN to ensure that written prescriptions for Level II medications are sent with patient to the facility as per protocol.     Bradley Anand RN

## 2021-07-15 NOTE — PROGRESS NOTES
[facility] Wound Ostomy Continence Nurse  Follow-up Progress Note       NAME:  Kendrick Lawson  MEDICAL RECORD NUMBER:  [id number]  AGE:  72 y.o. GENDER:  female  :  1950  TODAY'S DATE:  7/15/2021    Subjective:  I am ready to get back to bed. PCA notified   Wound Identification:  Wound Type: Stage 4 pressure injury sacrum/mid coccyx. Cellulitis  Improving at site of previous vein harvest for CABG to left lower inner leg. To OR on 21 for infected saphenous vein harvest site, I & D done by Dr Zac Mckeon. Contributing Factors: edema, chronic pressure, decreased mobility and shear force         Patient Goal of Care:  [x] Wound Healing  [] Odor Control   [] Palliative Care  [] Pain Control   [] Other:     Objective:  Sitting up in chair. Legs elevated. NPWT infusion via sacral wound with secure dressing. Left leg dressing and ace wrap intact. BP (!) 103/58   Pulse 78   Temp 98.2 °F (36.8 °C) (Oral)   Resp 16   Ht 5' 8\" (1.727 m)   Wt 151 lb 11.2 oz (68.8 kg)   SpO2 95%   BMI 23.07 kg/m²   Sven Risk Score: Sven Scale Score: 15  Assessment:  Wound bed red, granulation tissue. Edges unattached due to undermining from 1000pm-600pm. See photo. Measurements:    Incision 21 Leg Left (Active)   Wound Image   07/15/21 1444   Dressing Status New dressing applied 07/15/21 1444   Dressing Change Due 07/16/21 07/15/21 1444   Incision Cleansed Cleansed with saline 07/15/21 144   Dressing/Treatment Moist to moist;Dry dressing;Roll gauze; Ace wrap 07/15/21 1444   Incision Length (cm) 9 undermining 1000  pm-600pm at 2 cm 07/15/21 1444   Incision Width (cm) 3 cm 07/15/21 144   Incision Depth (cm) 2 cm 07/15/21 144   Margins Other (Comment) unattached 07/15/21 1444   Incision Assessment Other (Comment) red granulation tissue in wound base 07/15/21 144   Drainage Amount Small 07/15/21 144   Drainage Description Serosanguinous 07/15/21 144   Odor None 07/15/21 1444 Blanca-incision Assessment Dry/flaky; Warm;Other (Comment) maroon edges. 07/15/21 1444   Number of days: 2     Left  lower leg open incision:            Response to treatment:  Well tolerated by patient. Pain Assessment:  Severity:  8 / 10  Quality of pain: sharp  Wound Pain Timing/Severity: constant  Premedicated: Yes  Plan:   Plan of Care: Wound 07/07/21 #3, left medial leg, dehisced surgical, full thickness, onset 5/2021-Dressing/Treatment: Moist to moist, Dry dressing, Roll gauze  Wound 12/18/20 #2, Sacrum, Pressure Injury, Stage 4, Onset 5/2020-Dressing/Treatment: Barrier film, Hydrocolloid, Negative pressure wound therapy  Wound 07/12/21 Knee Left; Inner purple-Dressing/Treatment: Pharmaceutical agent (see MAR) (venelex)  Wound 07/12/21 Ankle Left; Inner purple-Dressing/Treatment: Pharmaceutical agent (see MAR) (venelex)      Call from [facility], CNP. Dr Carole Ledezma ordered MOist to moist dressing today and then start NPWT on left leg wound tomorrow, 7/16/21. Recommend:  Plan to start NPWT to left lower leg incision tomorrow. Today dressing removed. Wound photo'ed and measured. Moist to moist dressing applied to wound bed, covered with dry dressing, roll guaze and ace wrap from toes to knee. Other wounds not assessed today. Specialty Bed Required : Yes   [x] Low Air Loss   [x] Pressure Redistribution  [] Fluid Immersion  [] Bariatric  [] Total Pressure Relief  [] Other:     Current Diet: ADULT DIET;  Regular; 4 carb choices (60 gm/meal); 1500 ml  Adult Oral Nutrition Supplement; Diabetic Oral Supplement  Dietician consult:  Yes    Discharge Plan:  Placement for patient upon discharge: skilled nursing   Patient appropriate for Outpatient [address]: Yes    Referrals:  []  following,  Updated will need \"Y\" connector to connect the 2 wounds or she will need a second VAC machine when she goes  Home.  [] [address] [facility]  [] Supplies  [] Other    Patient/Caregiver Teaching: Informed Dr Scott Sarkar wants to place a VAC dressing on her left lower leg tomorrow.   Level of patient/caregiver understanding able to:   [] Indicates understanding       [] Needs reinforcement  [] Unsuccessful      [x] Verbal Understanding  [] Demonstrated understanding       [] No evidence of learning  [] Refused teaching         [] N/A       Electronically signed by Mame Sinclair RN, MSN, Viri Kaufman on 7/15/2021 at 2:58 PM

## 2021-07-15 NOTE — OP NOTE
Operative Note      Patient: Jay Miller  YOB: 1950  MRN: [id number]    Date of Procedure: 7/13/2021    Pre-Op Diagnosis: -Infected great saphenous vein harvest site    Post-Op Diagnosis: Same       Procedure(s):  INCISION AND DEBRIDEMENT LEFT LOWER LEG WOUND WITH POSSIBLE WOUND VAC PLACEMENT       Surgeon(s):  Brett Celaya MD    Assistant:   Surgical Assistant: Lexi Delacruz    Anesthesia: General    Estimated Blood Loss (mL): less than 537     Complications: None    Specimens:   ID Type Source Tests Collected by Time Destination   1 : LEFT LOWER LEG ABCESS Tissue Tissue CULTURE, FUNGUS, CULTURE, SURGICAL (Canceled), CULTURE, TISSUE (Canceled), CULTURE WITH SMEAR, ACID FAST [address] [facility], MD 7/13/2021 7068      Detailed Description of Procedure:   Patient was taken to the operating room after informative consent was obtained lying supine under general anesthesia ET tube was placed with no difficulty. Access was placed by anesthesia with no complication. Next patient's left leg was prepped and draped in standard fashion pause for the cause was in standard manner. incision the  harvest side of  the great saphenous vein was opened about 100 cc of puslike material was drained significant amount of necrotic material was debrided soft tissue only no muscle. Some necrotic skin was debrided. Wound size 4 inch x 2 x 1 hemostasis was secured. Copious amount of irrigation with normal saline. Peroxide and Betadine dressing wet-to-dry was placed and leg was wrapped patient was dispositioned to recovery room in stable condition without no complication .     Electronically signed by Brett Celaya MD on 7/15/2021 at 2:02 PM

## 2021-07-15 NOTE — PROGRESS NOTES
Physical Therapy  Facility/Department: [facility] - MED SURG  Daily Treatment Note  NAME: Andrzej Cotton  : 1950  MRN: [id number]    Date of Service: 7/15/2021    Discharge Recommendations:  Subacute/Skilled Nursing Facility   PT Equipment Recommendations  Equipment Needed: No    Assessment   Body structures, Functions, Activity limitations: Decreased functional mobility ; Decreased ROM; Decreased strength;Decreased endurance; Increased pain  Assessment: Pt was co-treated with OT to safely progress mobility. Ambulated 25 ft with SW and SBA without LOB. SBA for bed mobility and CGA for transfers. Performed seated chair exercises at end of session. Pt continues to be limited by LLE and sacral pain, fatigue, and general deconditioning. Would benefit from skilled therapy to address deficits. Continue to recommend SNf at D/C. Treatment Diagnosis: impaired functional mobility  Specific instructions for Next Treatment: progress mobility as tolerated  Prognosis: Good  Decision Making: Medium Complexity  PT Education: Goals;PT Role;Plan of Care;Home Exercise Program;Transfer Training;Energy Conservation;General Safety;Gait Training;Disease Specific Education; Functional Mobility Training;Weight-bearing Education; Injury Prevention;Pressure Relief  Disease Specific Education: Pt educated on importance of OOB mobility, prevention of complications of bedrest, and general safety during hospitalization. Pt verbalized understanding. Barriers to Learning: pain  REQUIRES PT FOLLOW UP: Yes  Activity Tolerance  Activity Tolerance: Patient limited by fatigue;Patient limited by pain; Patient limited by endurance  Activity Tolerance: before mobility: /56, HR 84, SpO2 97% on room air; after mobility: 106/57, HR 91     Patient Diagnosis(es): The primary encounter diagnosis was Left leg cellulitis. A diagnosis of Failure of outpatient treatment was also pertinent to this visit.      has a past medical history of Atherosclerosis of native artery of right lower extremity with rest pain ([address] Back pain, Branch retinal vein occlusion, Bronchiectasis with acute exacerbation (HCC), Closed compression fracture of thoracic vertebra ([address] Closed fracture of facial bone with routine healing, Closed jaw fracture ([address] Community acquired pneumonia of left lower lobe of lung, Compression fracture of L1 lumbar vertebra (HCC), COPD (chronic obstructive pulmonary disease) ([address] Fracture of tibial plateau, closed, left, initial encounter, Minimally displaced zone I fracture of sacrum ([address] MRSA (methicillin resistant Staphylococcus aureus), Mucus plugging of bronchi, Osteomyelitis of mandible, Osteoporosis with pathological fracture, Post herpetic neuralgia, Proximal humerus fracture, Rheumatoid arthritis ([address] Shingles, Sleep apnea, Temporal arteritis ([address] Tobacco use, Tracheomalacia, and Vitreous hemorrhage, right eye ([address] has a past surgical history that includes hernia repair; Mandible fracture surgery; Foot surgery; Elbow surgery; Cataract removal; Tubal ligation; Knee arthroscopy; Kyphosis surgery; laminectomy; Spinal fusion; Septoplasty (05/07/2013); Artery surgery (05/30/2013); Upper gastrointestinal endoscopy (04/08/2014); bronchoscopy; bronchoscopy (N/A, 06/12/2019); Mandible fracture surgery (02/2020); back surgery (08/2020); other surgical history (01/08/2021); Pressure ulcer debridement (N/A, 01/08/2021); Artery Biopsy (Right, 03/01/2021); Coronary artery bypass graft (N/A, 05/03/2021); Mediastinoscopy (N/A, 05/05/2021); Cardiac surgery (05/03/2021); Leg Surgery (Left, 7/13/2021); and IR MIDLINE CATH (7/14/2021). Restrictions  Restrictions/Precautions  Restrictions/Precautions: General Precautions, Contact Precautions, Fall Risk (up with assist)  Position Activity Restriction  Other position/activity restrictions: LLE wound, sacral wound vac  Subjective   General  Chart Reviewed:  Yes  Additional Pertinent Hx: s/p CABG 5/2/21; Stage IV sacral ulcer  Response To Previous Treatment: Patient with no complaints from previous session. Family / Caregiver Present: No  Referring Practitioner: Leidy Damon MD  Subjective  Subjective: Pt agreeable to therapy. General Comment  Comments: Pt in bed on approach. RN cleared Pt for therapy.   Pain Screening  Patient Currently in Pain: Yes  Pain Assessment  Pain Assessment: Faces  Tripathi-Baker Pain Rating: Hurts even more  Pain Type: Surgical pain;Acute pain  Pain Location: Buttocks;Leg  Pain Orientation: Left  Non-Pharmaceutical Pain Intervention(s): Ambulation/Increased Activity;Repositioned  Response to Pain Intervention: Patient Satisfied  Vital Signs  Patient Currently in Pain: Yes       Orientation  Orientation  Overall Orientation Status: Within Functional Limits       Objective   Bed mobility  Supine to Sit: Stand by assistance  Sit to Supine: Unable to assess (Pt in chair at end of session)  Scooting: Stand by assistance  Comment: increased time; HOB elevated    Transfers  Sit to Stand: Contact guard assistance  Stand to sit: Contact guard assistance  Comment: with SW; decreased WB through LLE due to pain; cued for hand placement    Ambulation  Ambulation?: Yes  More Ambulation?: Yes  Ambulation 1  Surface: level tile  Device: Standard Walker  Assistance: Stand by assistance  Quality of Gait: slow daja; forward flexed at trunk; able to correct with min cueing; decreased WB on LLE; Min A to guide walker durig turns; overall steady  Gait Deviations: Slow Daja;Decreased step length;Decreased step height;Deviated path  Distance: 25 ft within room        Exercises  Hip Flexion: seated marches BLE x10  Knee Long Arc Quad: BLE x10  Ankle Pumps: seated BLE x10  Comments: demonstration provided; cued for technique; educated to perform exercises throughout day        AM-PAC Score     AM-PAC Inpatient Mobility without Stair Climbing Raw Score : 13 (07/15/21 1503)  AM-[facility] Inpatient without Stair Climbing T-Scale Score : 38.96 (07/15/21 1503)  Mobility Inpatient CMS 0-100% Score: 58.44 (07/15/21 1503)  Mobility Inpatient without Stair CMS G-Code Modifier : CK (07/15/21 1503)       Goals  Short term goals  Time Frame for Short term goals: 1 week (7/19)  Short term goal 1: Pt will perform bed mobility with SBA; 7/15 SBA  Short term goal 2: Pt will perfrom transfers with SBA; 7/15 CGA  Short term goal 3: Pt will ambulate 50 ft with RW and SBA; PROGRESSING 7/15 25 ft with SW and SBA  Short term goal 4: 7/15: Pt will participate in 12-15 reps of BLE exercises to promote strengthening; 7/15 PROGRESSING 10 reps  Patient Goals   Patient goals : \"to go home\"    Plan    Plan  Times per week: 3-5x/week  Times per day: Daily  Specific instructions for Next Treatment: progress mobility as tolerated  Current Treatment Recommendations: Strengthening, ROM, Functional Mobility Training, Endurance Training, Transfer Training, Gait Training, Positioning, Pain Management  Safety Devices  Type of devices: All fall risk precautions in place, Call light within reach, Chair alarm in place, Nurse notified, Gait belt, Left in chair, Patient at risk for falls  Restraints  Initially in place: No     Therapy Time   Individual Concurrent Group Co-treatment   Time In       1344   Time Out       1410   Minutes       26   Timed Code Treatment Minutes: 700 [address]    If pt is unable to be seen after this session, please let this note serve as discharge summary. Please see case management note for discharge disposition. Thank you.

## 2021-07-16 NOTE — DISCHARGE SUMMARY
Hospital Medicine Discharge Summary    Patient: Consuelo Yin     Gender: female  : 1950   Age: 72 y.o. MRN: [id number]    Admitting Physician: Leidy Damon MD  Discharge Physician: Rupa Mares MD     Code Status: Prior     Admit Date: 2021   Discharge Date: 7/15/2021      Disposition:  Home    Discharge Diagnoses:  1. LLE Wound infection with MRSA/Pseudomonas s/p incision and debridement - surgical specimens grew MRSA. 2. Stage IV Sacral decubitus ulcer- s/p woundvac application. 3. Hypokalemia/hyponatremia/ hypomagnesemia-resolved. 4. Pulmonary edema with CAD (s/p CABG 2021)  5. COPD with bronchiectasis  6. Acute hyperglycemia with uncontrolled type II DM      Follow-up appointments:  As arranged    Outpatient to do list: Will have a woundvac applied to the leg ulcer tomorrow    Condition at Discharge:  Stable    Hospital Course:   Consuelo Yin is a 72 y.o. female admitted for  acute worsening infection/ swelling of LLE at graft harvest site post CABG 02. She has a history of CHF. She was admitted for cellulitis at the site of graft site harvest for her CABG prior . She was previously on Levaquin and Bactrim, the latter agent being replaced by doxycycline 2 days PTA with no improvement. Patient also has a stage IV sacral decubitus ulceration which the family reports is no longer amenable to wound VAC therapy. In addition to her sacral decubitus ulcer which is already known she had a left lower extremity ulcer-imaging of which showed a fluid collection adjacent to the ulcer. She was taken to the OR on for incision and drainage/debridement-with possible wound VAC placement. Surgical specimens from incision and drainage are growing MRSA. She was [facility] on a 14 day course of ciprofloxacin and doxycycline and will follow up with wound care and ID as an outpatient.       Discharge Medications:   Discharge Medication List as of 7/15/2021  5:25 PM      START taking these medications    Details   ciprofloxacin (CIPRO) 500 MG tablet Take 1 tablet by mouth 2 times daily for 14 days, Disp-28 tablet, R-0Normal           Discharge Medication List as of 7/15/2021  5:25 PM      CONTINUE these medications which have CHANGED    Details   doxycycline hyclate (VIBRA-TABS) 100 MG tablet Take 1 tablet by mouth 2 times daily for 14 days -- continue your levofloxacin, add this antibiotic, but STOP the Bactrim (trimethoprpm-sulfamethoxazole). , Disp-28 tablet, R-0Normal           Discharge Medication List as of 7/15/2021  5:25 PM      CONTINUE these medications which have NOT CHANGED    Details   DALIRESP 500 MCG tablet TAKE 1 TABLET BY MOUTH DAILY, Disp-30 tablet, R-11Normal      clopidogrel (PLAVIX) 75 MG tablet Take 1 tablet by mouth daily, Disp-30 tablet, R-11Normal      metoprolol tartrate (LOPRESSOR) 25 MG tablet Take 0.5 tablets by mouth 2 times daily Hold this medication for pulse <58 or systolic BP <083, GGEJ-16 tablet, R-11Normal      predniSONE (DELTASONE) 1 MG tablet Take 4 mg by mouth dailyHistorical Med      insulin glargine (LANTUS) 100 UNIT/ML injection vial Inject 15 Units into the skin nightly, Disp-1 vial, R-0NO PRINT      oxyCODONE-acetaminophen (PERCOCET)  MG per tablet Take 1 tablet by mouth daily. Historical Med      docusate sodium (COLACE) 100 MG capsule Take 100 mg by mouth 2 times daily as needed for ConstipationHistorical Med      gabapentin (NEURONTIN) 300 MG capsule Take 300 mg by mouth 2 times daily. Historical Med      latanoprost (XALATAN) 0.005 % ophthalmic solution Place 1 drop into both eyes nightlyHistorical Med      Teriparatide, Recombinant, (FORTEO) 600 MCG/2.4ML SOPN injection Inject 20 mcg into the skin dailyHistorical Med      NARCAN 4 MG/0.1ML LIQD nasal spray PLEASE SEE ATTACHED FOR DETAILED DIRECTIONS, DAWHistorical Med      morphine (MS CONTIN) 15 MG extended release tablet 15 mg 2 times daily.  Historical Med      ipratropium (ATROVENT) 0.06 % nasal spray USE 2 SPRAYS BY NASAL ROUTE 2-4 TIMES DAILY, Disp-1 Bottle,R-5Normal      albuterol sulfate  (90 Base) MCG/ACT inhaler INHALE 2 PUFFS INTO THE LUNGS EVERY 4 HOURS AS NEEDED FOR WHEEZING, Disp-1 Inhaler, R-5Normal      vitamin D (CHOLECALCIFEROL) 1000 UNIT TABS tablet Take 5,000 Units by mouth daily Historical Med      calcium carbonate (OSCAL) 500 MG TABS tablet Take 500 mg by mouth dailyHistorical Med      atorvastatin (LIPITOR) 40 MG tablet Take 40 mg by mouthHistorical Med      cyclobenzaprine (FLEXERIL) 10 MG tablet Take 10 mg by mouth 2 times daily as needed Historical Med      Insulin Syringe-Needle U-100 31G X 5/16\" 0.5 ML MISC Historical Med      meloxicam (MOBIC) 15 MG tablet Patient states taking only as neededHistorical Med      ACCU-CHEK CEDRICK PLUS strip TEST 4 TIMES DAILY, R-3, DAWHistorical Med      aspirin EC 81 MG EC tablet Take 1 tablet by mouth dailyHistorical Med      insulin lispro (HUMALOG) 100 UNIT/ML injection vial Inject 0-12 Units into the skin 3 times daily (with meals)Historical Med      Misc. Devices (ACAPELLA) MISC PRN Starting 9/2/2015, Until Discontinued, Disp-1 each, R-0, Print      fluticasone (FLONASE) 50 MCG/ACT nasal spray INHALE 2 SPRAYS IN EACH NOSTRIL DAILY, Disp-1 Bottle, R-5      cetirizine (ZYRTEC) 10 MG tablet Take 10 mg by mouth daily. etanercept (ENBREL) 50 MG/ML injection Inject 50 mg into the skin every 7 days.       omeprazole (PRILOSEC) 40 MG capsule Take 40 mg by mouth daily Historical Med           Discharge Medication List as of 7/15/2021  5:25 PM      STOP taking these medications       levoFLOXacin (LEVAQUIN) 500 MG tablet Comments:   Reason for Stopping:         azithromycin (ZITHROMAX) 250 MG tablet Comments:   Reason for Stopping:         metroNIDAZOLE (FLAGYL) 250 MG tablet Comments:   Reason for Stopping:         DULoxetine (CYMBALTA) 60 MG extended release capsule Comments:   Reason for Stopping:               Discharge ROS:  A complete review of systems was asked and negative except for above     Discharge Exam:    BP (!) 103/58   Pulse 78   Temp 98.2 °F (36.8 °C) (Oral)   Resp 16   Ht 5' 8\" (1.727 m)   Wt 151 lb 11.2 oz (68.8 kg)   SpO2 95%   BMI 23.07 kg/m²      General appearance: No apparent distress, appears stated age and cooperative. HEENT: Pupils equal, round, and reactive to light. Conjunctivae/corneas clear. Neck: Supple,  No jugular venous distention. Trachea midline. Respiratory:  Normal respiratory effort. Clear to auscultation, bilaterally. Cardiovascular: Regular rate and rhythm with normal S1/S2 without murmurs, rubs or gallops. Abdomen: Soft, non-tender, non-distended with normal bowel sounds. Musculoskeletal: No clubbing, cyanosis or edema bilaterally. LLE erythema/surgical site dressed . Skin: Skin color, texture, turgor normal.  No rashes or lesions. Stage IV sacral decubitus ulcer- wound vac in situ. Neurologic:  Neurovascularly intact without any focal sensory/motor deficits. Cranial nerves: II-XII intact, grossly non-focal.  Psychiatric: Alert and oriented, thought content appropriate, normal insight  Capillary Refill: Brisk,3 seconds, normal   Peripheral Pulses: +2 palpable, equal bilaterally   Labs:  For convenience and continuity at follow-up the following most recent labs are provided:    Lab Results   Component Value Date    WBC 5.8 07/15/2021    HGB 8.8 07/15/2021    HCT 26.8 07/15/2021    MCV 83.0 07/15/2021     07/15/2021     07/15/2021    K 4.2 07/15/2021    K 3.3 07/12/2021    CL 99 07/15/2021    CO2 28 07/15/2021    BUN 16 07/15/2021    CREATININE 0.7 07/15/2021    CALCIUM 9.0 07/15/2021    PHOS 4.6 05/01/2021    ALKPHOS 123 07/12/2021    ALT 6 07/12/2021    AST 11 07/12/2021    BILITOT 0.3 07/12/2021    BILIDIR <0.2 05/03/2021    LABALBU 3.0 07/12/2021    LDLCALC 35 05/03/2021    TRIG 64 05/03/2021     Lab Results   Component Value Date    INR 1.09 07/14/2021    INR 1.04 05/04/2021    INR 1.42 (H) 05/03/2021       Radiology:  XR SACRUM COCCYX (MIN 2 VIEWS)    Result Date: 6/16/2021  EXAMINATION: THREE XRAY VIEWS OF THE SACRUM/COCCYX 6/16/2021 11:54 am COMPARISON: None. HISTORY: ORDERING SYSTEM PROVIDED HISTORY: Pressure ulcer of coccygeal region, stage 4 [facility]) TECHNOLOGIST PROVIDED HISTORY: Reason for Exam: Pressure ulcer of coccygeal Acuity: Acute Type of Exam: Initial FINDINGS: The sacrum is intact with no appreciated fracture. Normal contour of sacral foramina. SI joints are symmetric and normal.  No additional pelvic abnormality. No coccygeal fracture. There are multiple levels of kyphoplasty in the visualized lumbar spine. L5 compression fracture is remote and untreated. No detrimental change. No acute finding of the sacrum or coccyx. Multiple compression fractures in the lumbar spine with several levels of kyphoplasty that appear stable. XR TIBIA FIBULA LEFT (2 VIEWS)    Result Date: 7/11/2021  EXAMINATION: 2 XRAY VIEWS OF THE LEFT TIBIA AND FIBULA 7/11/2021 3:16 pm COMPARISON: None. HISTORY: ORDERING SYSTEM PROVIDED HISTORY: wound/infection TECHNOLOGIST PROVIDED HISTORY: Reason for exam:->wound/infection Reason for Exam: wound, infection Acuity: Unknown Type of Exam: Initial FINDINGS: Diffuse soft tissue swelling. Surgical clips in the posteromedial soft tissues of the level of the proximal tibia. Overlying shallow soft tissue defect. No soft tissue gas. Atherosclerotic vascular calcifications. No fracture or dislocation. No osseous erosion or periostitis. Mild tricompartmental degenerative changes of the knee. No joint effusion. 1. Shallow soft tissue defect over the lateral aspect of the proximal leg with diffuse soft tissue swelling. No soft tissue gas. 2. No radiographic evidence of osteomyelitis or other acute osseous abnormality.      XR CHEST PORTABLE    Result Date: 7/12/2021  EXAMINATION: ONE XRAY VIEW OF THE CHEST 7/11/2021 5:05 pm COMPARISON: 05/06/2021 HISTORY: ORDERING SYSTEM PROVIDED HISTORY: + COPD; r/o PNA TECHNOLOGIST PROVIDED HISTORY: Reason for exam:->+ COPD; r/o PNA Reason for Exam: r/o PNA Acuity: Acute Type of Exam: Initial FINDINGS: The heart size is enlarged. The patient is status post sternotomy. An atrial appendage closure device is present. There is no pneumothorax. Interstitial and airspace opacity is noted at the lung bases. The bony thorax is grossly intact. A small left-sided pleural effusion is suspected. Suspected interstitial pulmonary edema. Superimposed pneumonia at the lung bases cannot be excluded. Mild cardiomegaly. CT TIBIA FIBULA LEFT WO CONTRAST    Result Date: 7/13/2021  EXAMINATION: CT OF THE LEFT TIBIA AND FIBULA WITHOUT CONTRAST 7/11/2021 11:11 pm TECHNIQUE: CT of the left tibia and fibula was performed without the administration of intravenous contrast.  Multiplanar reformatted images are provided for review. Dose modulation, iterative reconstruction, and/or weight based adjustment of the mA/kV was utilized to reduce the radiation dose to as low as reasonably achievable. COMPARISON: None HISTORY ORDERING SYSTEM PROVIDED HISTORY: Left leg open wound s/p vein harvest with abscess formation and cellulitis TECHNOLOGIST PROVIDED HISTORY: Reason for exam:->Left leg open wound s/p vein harvest with abscess formation and cellulitis Reason for Exam: Left leg open wound s/p vein harvest with abscess formation and cellulitis Acuity: Acute Type of Exam: Initial FINDINGS: Bones: The tibia and fibula are osteopenic. No acute fracture. No acute periostitis or bony destruction. Soft Tissue: In the medial soft tissues of proximal right leg, there is a superficial wound. Subjacent to the wound, there is a fluid collection measuring 13 x 2.7 x 3.4 cm. Adjacent surgical clips are noted in the soft tissues. Circumferential superficial soft tissue edema is present throughout the leg.  Mild fatty atrophy in the the borders of the ulceration. No organized drainable fluid collection identified. 2. No evidence for osteomyelitis. IR MIDLINE CATH    Result Date: 7/14/2021  Radiology exam is complete. No Radiologist dictation. Please follow up with ordering provider. EKG     Rhythm: normal sinus   Rate: normal  Clinical Impression: no acute changes        The patient was seen and examined on day of discharge and this discharge summary is in conjunction with any daily progress note from day of discharge. Time Spent on discharge is 30 minutes  in the examination, evaluation, counseling and review of medications and discharge plan. Note that more than 30 minutes was spent in preparing discharge papers, discussing discharge with patient, medication review, etc.       Signed:    Thomas Canas MD   7/15/2021      Thank you Erica Trejo MD for the opportunity to be involved in this patient's care.  If you have any questions or concerns please feel free to contact me at [employer]

## 2021-07-16 NOTE — CARE COORDINATION
CM received call from [address] [place] [address] requesting an updated RX for wound vac supplies. We will have  sign another script today. Pt was discharged home yesterday evening. I spoke with Rosanna Paulson with [address] [place] [address]. She apologized for not letting CM know they needed additional supplies on the script. She states the [facility] WILL be delivered to pt today. She states the person that CM worked with yesterday, [place], was very new and did not have all the information needed. In any case, [facility] will be delivered today and we can fax updated RX to [address] [place] [address] once obtained. Writer also spoke with Floresita in intake @ [address]. CM also spoke with Manny Valero yesterday afternoon as per notes and Floresita verified that they received all needed info. Writer inquired as to whether a visit is planned for today as [facility] is being delivered. Manny Valero states they are waiting for insurance auth but she is aware that patient needs skilled home care visit today for wound care and wound vac placement. Faiza Ty was active with pt prior to admit. Fany Blackwood LSW   1309: Addendum - Updated RX faxed to [address] [place] [address].

## 2021-07-19 ENCOUNTER — HOSPITAL ENCOUNTER (OUTPATIENT)
Dept: CARDIAC REHAB | Age: 71
Setting detail: THERAPIES SERIES
Discharge: HOME | End: 2021-07-19
Payer: MEDICARE

## 2021-07-21 ENCOUNTER — HOSPITAL ENCOUNTER (OUTPATIENT)
Dept: WOUND CARE | Age: 71
Discharge: HOME | End: 2021-07-21
Payer: MEDICARE

## 2021-07-21 VITALS
HEIGHT: 70 IN | TEMPERATURE: 97.8 F | DIASTOLIC BLOOD PRESSURE: 69 MMHG | BODY MASS INDEX: 22.02 KG/M2 | WEIGHT: 153.8 LBS | HEART RATE: 101 BPM | RESPIRATION RATE: 20 BRPM | SYSTOLIC BLOOD PRESSURE: 133 MMHG

## 2021-07-21 DIAGNOSIS — L89.154 PRESSURE ULCER OF COCCYGEAL REGION, STAGE 4 (HCC): Primary | ICD-10-CM

## 2021-07-21 PROCEDURE — 11043 DBRDMT MUSC&/FSCA 1ST 20/<: CPT | Performed by: INTERNAL MEDICINE

## 2021-07-21 PROCEDURE — 97597 DBRDMT OPN WND 1ST 20 CM/<: CPT | Performed by: INTERNAL MEDICINE

## 2021-07-21 PROCEDURE — 97605 NEG PRS WND THER DME<=50SQCM: CPT

## 2021-07-21 PROCEDURE — 97597 DBRDMT OPN WND 1ST 20 CM/<: CPT

## 2021-07-21 PROCEDURE — 11043 DBRDMT MUSC&/FSCA 1ST 20/<: CPT

## 2021-07-21 RX ORDER — LIDOCAINE 40 MG/G
CREAM TOPICAL ONCE
Status: DISCONTINUED | OUTPATIENT
Start: 2021-07-21 | End: 2021-07-22 | Stop reason: HOSPADM

## 2021-07-21 RX ORDER — BACITRACIN ZINC AND POLYMYXIN B SULFATE 500; 1000 [USP'U]/G; [USP'U]/G
OINTMENT TOPICAL ONCE
Status: DISCONTINUED | OUTPATIENT
Start: 2021-07-21 | End: 2021-07-22 | Stop reason: HOSPADM

## 2021-07-21 RX ORDER — FLUCONAZOLE 100 MG/1
100 TABLET ORAL DAILY
Qty: 3 TABLET | Refills: 1 | Status: SHIPPED | OUTPATIENT
Start: 2021-07-21 | End: 2021-08-03 | Stop reason: ALTCHOICE

## 2021-07-21 RX ORDER — BACITRACIN ZINC AND POLYMYXIN B SULFATE 500; 1000 [USP'U]/G; [USP'U]/G
OINTMENT TOPICAL ONCE
Status: CANCELLED | OUTPATIENT
Start: 2021-07-21 | End: 2021-07-21

## 2021-07-21 RX ORDER — LIDOCAINE 40 MG/G
CREAM TOPICAL ONCE
Status: CANCELLED | OUTPATIENT
Start: 2021-07-21 | End: 2021-07-21

## 2021-07-21 RX ORDER — LIDOCAINE 50 MG/G
OINTMENT TOPICAL ONCE
Status: CANCELLED | OUTPATIENT
Start: 2021-07-21 | End: 2021-07-21

## 2021-07-21 RX ORDER — LIDOCAINE HYDROCHLORIDE 40 MG/ML
SOLUTION TOPICAL ONCE
Status: CANCELLED | OUTPATIENT
Start: 2021-07-21 | End: 2021-07-21

## 2021-07-21 RX ORDER — DULOXETIN HYDROCHLORIDE 60 MG/1
60 CAPSULE, DELAYED RELEASE ORAL DAILY
Status: ON HOLD | COMMUNITY
End: 2021-12-28 | Stop reason: HOSPADM

## 2021-07-21 ASSESSMENT — PAIN SCALES - GENERAL: PAINLEVEL_OUTOF10: 0

## 2021-07-21 NOTE — PLAN OF CARE
[address] [facility] Orders [facility] Discharge [address]  [address], [address]  [place], [facility]  Telephone: [phone]      Fax: [phone] home care company:   [facility]     Your wound-care supplies will be provided by: We are ordering your wound-care supplies from [employer]. Please note, depending on your insurance coverage, you may have out-of-pocket expenses for these supplies. Someone from [employer] should call you to confirm your order and discuss those potential costs before they ship your products -- please anticipate that call. If your out-of-pocket cost could be substantial, [facility] has a financial hardship program for patients who qualify, so please ask about that if you might need a hand. If you have any questions about your supplies or your potential out-of-pocket costs, or if you need to place an order for a refill of supplies (typically monthly), please call [phone]. NAME:  Rema Taveras   YOB: 1950  PRIMARY DIAGNOSIS FOR WOUND CARE CENTER:  Pressure ulcer     Wound cleansing:   Do not scrub or use excessive force. Wash hands with soap and water before and after dressing changes. Prior to applying a clean dressing, cleanse wound with normal saline, wound cleanser, or mild soap and water. Ask your physician or nurse before getting the wound(s) wet in the shower.                 Wound care for home:     LEFT MEDIAL LOWER LEG WOUND:  Vashe to wound  Flagyl crushed & sprinkled on wound bed as needed for malodor  AMD fluff gauze gently tucked into undermining as marked per MD Wen foam to wound remaining wound  Bridge to lower medial thigh (MAKE SURE DRAPE IS UNDER ANY FOAM FOR BRIDGE)  Pressure -125 mmHg continuous  Cotton stockinette from toes to knee  Surepress compression from base of toes to knee  Change dressing Friday & Monday per [place]     Sacral Wound:   Skin prep & Hydrocolloid to maegan-wound skin irritation to protect from drape   Vashe or Hypochlorous acid soaked gauze applied to wound for 2-3 minutes, no need to rinse  Flagyl crushed & sprinkled on wound bed as needed for malodor  Stoma paste or Stoma barrier ring to distal area of wound to help obtain a better seal & prevent air leak  DRAPE to maegan-wound & UNDER ANY FOAM FOR BRIDGE  COLLAGEN TO BASE OF WOUND (send remainder home with patient today)  Black foam-IT HAS TO BE ON THE ACTUAL WOUND & bridged to left hip (MAKE SURE NOT TO OVER FILL UNDERMINED AREA WITH BLACK FOAM)  Cover with drape  Pressure -125 mmHg continuous  HHC to change dressing on Friday, Monday & Wednesday         Please note, all wounds (unless stated otherwise here) were mechanically debrided at the time of cleansing here in the wound-care center today, so a small amount of pain, drainage or bleeding from that process might be expected, and is normal.      All products for home use, including multiple products for a single wound if applicable, are medically necessary in order to achieve the best chance at timely wound healing. See provider documentation for details if needed. Substituted dressings applied in the [address] [place] [address],[address] today, if applicable:     N/a     New orders for this week (labs, imaging, medications, etc.):     Provide pt. With additional AMD 4x4's, extra stockinette & surepress  The stockinette & Surepress can be washed, Hang to dry     Home Care to order dressing supplies through [employer] when needed. May contact the local rep. For any questions or to order supplies  Nola Silverio # [phone]      Additional instructions for specific diagnoses:     +ROHO cushion- use at [address] when sitting!!      General comments for pressure ulcers: *  Make sure you stay well-hydrated, and maintain good protein intake. *  Reposition at least every two hours, to keep from having pressure in one spot for too long.   *  If you are not sure whether you have the best offloading surfaces (mattress, mattress overlay, chair cushion, heel-protector boots, etc), please ask. *  Moisturize your skin regularly with Vaseline, Aquaphor, Aveeno, CeraVe, Cetaphil, Eucerin, Lubriderm, etc; but keep the skin between your toes dry. *  If you smoke, your wound can not heal properly -- please talk with us when you're ready to quit. F/U Appointment is with Dr. Kelle Rodríguez in 1 week on                                                at                       .     Your nurse  is ABRAHAM Zambrano      If we applied slip-resistant hospital socks today, be sure to remove them at least once a day to inspect your toes or feet, even if you're not changing the wraps or dressings underneath. If you see anything concerning (redness, excess moisture, etc), please call and let us know right away. Should you experience any significant changes in your wound(s) (including redness, increased warmth, increased pain, increased drainage, odor, or fever) or have questions about your wound care, please contact the [address] [place] [address] at [phone] Monday-Thursday from 8:00 am - 4:30 pm, or Friday from 8:00 am - 2:30 pm.  If you need help with your wound outside these hours and cannot wait until we are again available, contact your home-care company (if applicable), your PCP, or go to the nearest emergency room.

## 2021-07-21 NOTE — PLAN OF CARE
Pt. Returning to [facility] today after recent hospitalization & debridement of leg wound. Both sacral & leg wounds debrided per MD & pt. Tolerated well. Will cont. With NPWT to both wounds as ordered. Will add using surepress on leg to help with edema control. [facility] cont. To follow for dressing changes. Reinforced importance of off-loading & protein intake to assist with wound healing. F/u in [facility] in 1 week as ordered, pt. Aware to call sooner with any changes or questions/concerns. Discharge instructions reviewed with patient, all questions answered, copy given to patient. Dressings were applied to all wounds per M.D. Instructions at this visit.

## 2021-07-25 PROBLEM — L03.116 CELLULITIS AND ABSCESS OF LEFT LEG: Status: RESOLVED | Noted: 2021-07-11 | Resolved: 2021-07-25

## 2021-07-25 PROBLEM — L02.416 CELLULITIS AND ABSCESS OF LEFT LEG: Status: RESOLVED | Noted: 2021-07-11 | Resolved: 2021-07-25

## 2021-07-25 NOTE — PROGRESS NOTES
[facility] Progress Note    Andrzej Cotton     : 1950    DATE OF VISIT:  2021    Subjective:     Andrzej Cotton is a 72 y.o. female who has a pressure ulcer located on the sacrum. Significant symptoms or pertinent wound history since last visit: unfortunately the empiric Abx that I prescribed two weeks ago did not match up with what her culture results showed. I called her that Friday to update her Bactrim to doxycycline, which she did, but despite that her leg continued to be inflamed and more symptomatic, so she went to the hospital over the weekend. Was on IV Abx for a time, went to the OR for a more extensive excisional debridement and drainage procedure at that left leg, was eventually discharged back home on oral ABx, and now with NPWT to both wounds. Feeling better overall, tired, some pain, increased LLE swelling. No F/C/D, no sore throat or mouth, N/V/D. She does seem to have a new inferior buttock rash, not really where the VAC drape would have been. Additional ulcer(s) noted? yes. Just that left lower leg infection / surgical wound. Her current medication list consists of Acapella, DULoxetine, Insulin Syringe-Needle U-100, Roflumilast, Teriparatide (Recombinant), albuterol sulfate HFA, aspirin EC, atorvastatin, blood glucose test strips, calcium carbonate, cetirizine, ciprofloxacin, clopidogrel, cyclobenzaprine, docusate sodium, doxycycline hyclate, etanercept, fluticasone, gabapentin, insulin glargine, insulin lispro, ipratropium, latanoprost, meloxicam, metoprolol tartrate, morphine, naloxone, omeprazole, oxyCODONE-acetaminophen, predniSONE, and vitamin D. Nearing the end of her doxy and Cipro.      Allergies: Atenolol    Objective:     Vitals:    21 0916   BP: 133/69   Pulse: 101   Resp: 20   Temp: 97.8 °F (36.6 °C)   TempSrc: Oral   Weight: 153 lb 12.8 oz (69.8 kg)   Height: 5' 10\" (1.778 m)     AAOx3, fatigued, NAD  No icterus, thrush, drug rash, reflux disease) K21.9    History of tobacco use Z87.891    Hyponatremia E87.1    Anemia D64.9    Cylindrical bronchiectasis (HCC) J47.9    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    Immunocompromised state ([facility] D84.9    Hypokalemia E87.6    Coronary artery disease I25.10    Bilateral lower extremity edema R60.0    Essential hypertension I10    Lumbar spondylosis M47.816    Mitral valve insufficiency and aortic valve insufficiency I08.0    Mixed hyperlipidemia E78.2    Myopia of both eyes H52.13    Osteoporosis M81.0    Other chronic sinusitis J32.8    Primary open angle glaucoma (POAG) of both eyes, mild stage H40.1131    Primary osteoarthritis of right hip M16.11    Type 2 diabetes mellitus with unspecified diabetic retinopathy without macular edema ([facility]) E11.319    Uncontrolled type 2 diabetes mellitus with diabetic peripheral angiopathy without gangrene, with long-term current use of insulin ([facility]) E11.51, E11.65, Z79.4    Pressure ulcer of coccygeal region, stage 4 ([facility]) L89.154    NSTEMI (non-ST elevated myocardial infarction) ([facility] I21.4    Hx of CABG Z95.1    Cellulitis of left lower extremity L03. 116    Mild malnutrition ([facility]) E44.1    Chronic diastolic congestive heart failure ([facility]) I50.32    Surgical wound dehiscence, initial encounter (at LLE SVG harvest site) T81.31XA       Assessment of today's active condition(s): RA, Hx back surgery, decreased mobility, stage 4 sacral pressure ulcer, slow to heal, no recent signs of infection, overall good progress with debridements, offloading and NPWT; with the added leg wound and infection, now trying to hold her Enbrel for 6 weeks or so, which I think is a good idea. Recent admission for ACS, underwent CABG, left leg saphenous vein harvest site nearly healed, but then a small eschar remained, eventually became infected with an abscess present, had to go to the OR for more extensive incision, drainage, debridement.  Infection seems basically resolved, but significant wound undermining present, and increased swelling, which will slow wound healing. Factors contributing to occurrence and/or persistence of the chronic ulcer include edema, chronic pressure, decreased mobility, shear force and immunosuppression. Medical necessity of today's visit is shown by the above documentation. Sharp debridement is indicated today, based upon the exam findings in the wound(s) above. Procedure note:     Consent obtained. Time out performed per [facility]. Anesthetic  Anesthetic: 4% Lidocaine Cream     Using a curette, I sharply debrided the sacrum ulcer(s) down through and including the removal of dermis. The type(s) of tissue debrided included fibrin and biofilm. Total Surface Area Debrided: 7 sq cm. Using a curette, scissors and forceps, I sharply debrided the left  lower leg ulcer(s) down through and including the removal of muscle/fascia. The type(s) of tissue debrided included fibrin, biofilm and necrotic/eschar. Total Surface Area Debrided: 18 sq cm. The ulcers were then irrigated with normal saline solution. The procedure was completed with a small amount of bleeding, and hemostasis was with pressure. The patient tolerated the procedure well, with no significant complications. The patient's level of pain during and after the procedure was monitored. Post-debridement measurements, if different from pre-debridement, are in the flowsheet as well. Discharge plan:     Treatment in the wound care center today, per RN documentation: Wound 07/07/21 #3, left medial leg, dehisced surgical, full thickness, onset 5/2021-Dressing/Treatment:  (Flagyl, AMD gauze (into undermining),NPWT,stockingette,surepress)  Wound 12/18/20 #2, Sacrum, Pressure Injury, Stage 4, Onset 5/2020-Dressing/Treatment:  (flagyl,collagen,NPWT). Complete Abx in a day or two as scheduled.   I'll call in 3 days of Diflucan for the cutaneous Candidiasis, and a refill if needed for the near future. Agree with trying to have her off Enbrel for a time, if possible. Continue good offloading practices with ROHO cushion, lying on her side, etc.    I reminded the patient of the importance of weight management and smoking cessation, if applicable; also encouraged ambulation as tolerated, additional lower extremity exercises as instructed in our education sheet, leg elevation when at rest, and compliance with any recommended dietary, diuretic and compression therapies. Home treatment: good handwashing before and after any dressing changes. Cleanse wound with saline or soap & water before dressing change. May use Vaseline (petrolatum), Aquaphor, Aveeno, CeraVe, Cetaphil, Eucerin, Lubriderm, etc for dry skin. Dressing type for home: as above, three times weekly. Written discharge instructions given to patient. Follow up in 1 week.     Electronically signed by Jeane Egan MD on 7/25/2021 at 1:00 PM.

## 2021-07-28 ENCOUNTER — HOSPITAL ENCOUNTER (OUTPATIENT)
Dept: WOUND CARE | Age: 71
Discharge: HOME | End: 2021-07-28
Payer: MEDICARE

## 2021-07-28 VITALS
DIASTOLIC BLOOD PRESSURE: 67 MMHG | RESPIRATION RATE: 18 BRPM | BODY MASS INDEX: 21.62 KG/M2 | HEART RATE: 87 BPM | TEMPERATURE: 97.2 F | HEIGHT: 70 IN | WEIGHT: 151 LBS | SYSTOLIC BLOOD PRESSURE: 117 MMHG

## 2021-07-28 DIAGNOSIS — T81.31XA SURGICAL WOUND DEHISCENCE, INITIAL ENCOUNTER: Primary | ICD-10-CM

## 2021-07-28 DIAGNOSIS — L89.154 PRESSURE ULCER OF COCCYGEAL REGION, STAGE 4 (HCC): ICD-10-CM

## 2021-07-28 DIAGNOSIS — T81.31XA SURGICAL WOUND DEHISCENCE, INITIAL ENCOUNTER: ICD-10-CM

## 2021-07-28 PROCEDURE — 97597 DBRDMT OPN WND 1ST 20 CM/<: CPT | Performed by: INTERNAL MEDICINE

## 2021-07-28 PROCEDURE — 11046 DBRDMT MUSC&/FSCA EA ADDL: CPT | Performed by: INTERNAL MEDICINE

## 2021-07-28 PROCEDURE — 11043 DBRDMT MUSC&/FSCA 1ST 20/<: CPT | Performed by: INTERNAL MEDICINE

## 2021-07-28 PROCEDURE — 11043 DBRDMT MUSC&/FSCA 1ST 20/<: CPT

## 2021-07-28 PROCEDURE — 11046 DBRDMT MUSC&/FSCA EA ADDL: CPT

## 2021-07-28 PROCEDURE — 97605 NEG PRS WND THER DME<=50SQCM: CPT

## 2021-07-28 PROCEDURE — 97597 DBRDMT OPN WND 1ST 20 CM/<: CPT

## 2021-07-28 RX ORDER — LIDOCAINE HYDROCHLORIDE 40 MG/ML
SOLUTION TOPICAL ONCE
Status: CANCELLED | OUTPATIENT
Start: 2021-07-28 | End: 2021-07-28

## 2021-07-28 RX ORDER — BACITRACIN ZINC AND POLYMYXIN B SULFATE 500; 1000 [USP'U]/G; [USP'U]/G
OINTMENT TOPICAL ONCE
Status: CANCELLED | OUTPATIENT
Start: 2021-07-28 | End: 2021-07-28

## 2021-07-28 RX ORDER — LIDOCAINE 50 MG/G
OINTMENT TOPICAL ONCE
Status: CANCELLED | OUTPATIENT
Start: 2021-07-28 | End: 2021-07-28

## 2021-07-28 RX ORDER — LIDOCAINE 40 MG/G
CREAM TOPICAL ONCE
Status: DISCONTINUED | OUTPATIENT
Start: 2021-07-28 | End: 2021-07-29 | Stop reason: HOSPADM

## 2021-07-28 RX ORDER — LIDOCAINE 40 MG/G
CREAM TOPICAL ONCE
Status: CANCELLED | OUTPATIENT
Start: 2021-07-28 | End: 2021-07-28

## 2021-07-28 RX ORDER — BACITRACIN ZINC AND POLYMYXIN B SULFATE 500; 1000 [USP'U]/G; [USP'U]/G
OINTMENT TOPICAL ONCE
Status: DISCONTINUED | OUTPATIENT
Start: 2021-07-28 | End: 2021-07-29 | Stop reason: HOSPADM

## 2021-07-28 NOTE — PLAN OF CARE
[address] [facility] Physician Orders and Discharge [address]  [address], [address]  [place], [facility]  Telephone: [phone]      Fax: [phone] home care company:   [facility]     Your wound-care supplies will be provided by: We are ordering your wound-care supplies from [employer]. Please note, depending on your insurance coverage, you may have out-of-pocket expenses for these supplies. Someone from [employer] should call you to confirm your order and discuss those potential costs before they ship your products -- please anticipate that call. If your out-of-pocket cost could be substantial, [facility] has a financial hardship program for patients who qualify, so please ask about that if you might need a hand. If you have any questions about your supplies or your potential out-of-pocket costs, or if you need to place an order for a refill of supplies (typically monthly), please call [phone]. NAME:  Chloe Taveras   YOB: 1950  PRIMARY DIAGNOSIS FOR WOUND CARE CENTER:  Pressure ulcer     Wound cleansing:   Do not scrub or use excessive force. Wash hands with soap and water before and after dressing changes. Prior to applying a clean dressing, cleanse wound with normal saline, wound cleanser, or mild soap and water. Ask your physician or nurse before getting the wound(s) wet in the shower.                 Wound care for home:     Left Forearm:  Mepitel One applied per MD & to remain in place for the week    At Home:  Apply antibiotic ointment  Cover with 4x4  Hold in place with Spandagrip   Change dressing once daily     LEFT MEDIAL LOWER LEG WOUND:  Vashe to wound  Flagyl crushed & sprinkled on wound bed as needed for malodor  AMD fluff gauze gently tucked into undermining as marked per MD  Black foam to wound remaining wound  Pressure -125 mmHg continuous  Cotton stockinette from toes to knee  Surepress

## 2021-07-28 NOTE — PLAN OF CARE
Leg wound showing improvement with NPWT. Sacral wound not showing much change. Debridement of both wounds per MD & pt. Tolerated well. Dr Hernan Ortiz discussed importance of doing better with off-loading of sacral wound to assist with wound healing. Pt. States she spends her time in chair on her side & when sitting it is in her w/c with ROHO cushion. Pt. Also states when she is in her bed, she sleeps on her side & not able to sleep on her back d/t back problems. Again reinforced to find improvement with off-loading, pt. Verbalizes understanding & that she will attempt to spend more time in bed vs. Chair. F/u in [address] [place] [address],[address] in 1 week as ordered, pt. Aware to call sooner with any changes or questions/concerns. Discharge instructions reviewed with patient, all questions answered, copy given to patient. Dressings were applied to all wounds per M.D. Instructions at this visit.

## 2021-08-02 PROBLEM — L03.116 CELLULITIS OF LEFT LOWER EXTREMITY: Status: RESOLVED | Noted: 2021-07-11 | Resolved: 2021-08-02

## 2021-08-02 NOTE — PROGRESS NOTES
fibrin and biofilm, no clear deep necrosis, no exposed bone; leg wound definitely looking better than last week, still some undermining, but more granulation, some fibrin and biofilm, smaller foci of fat and fascial necrosis, no pus. Photos also saved in electronic chart.     Today's wound measurements, per RN documentation:  Wound 07/07/21 #3, left medial leg, dehisced surgical, full thickness, onset 5/2021-Wound Length (cm): 9.5 cm  Wound 12/18/20 #2, Sacrum, Pressure Injury, Stage 4, Onset 5/2020-Wound Length (cm): 4 cm    Wound 07/07/21 #3, left medial leg, dehisced surgical, full thickness, onset 5/2021-Wound Width (cm): 2.3 cm  Wound 12/18/20 #2, Sacrum, Pressure Injury, Stage 4, Onset 5/2020-Wound Width (cm): 2 cm    Wound 07/07/21 #3, left medial leg, dehisced surgical, full thickness, onset 5/2021-Wound Depth (cm): 0.8 cm  Wound 12/18/20 #2, Sacrum, Pressure Injury, Stage 4, Onset 5/2020-Wound Depth (cm): 1.7 cm    Assessment:     Patient Active Problem List   Diagnosis Code    Rheumatoid arthritis ([facility] M06.9    Psoriasis L40.9    GERD (gastroesophageal reflux disease) K21.9    History of tobacco use Z87.891    Hyponatremia E87.1    Anemia D64.9    Cylindrical bronchiectasis (HCC) J47.9    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    Immunocompromised state ([facility] D84.9    Hypokalemia E87.6    Coronary artery disease I25.10    Bilateral lower extremity edema R60.0    Essential hypertension I10    Lumbar spondylosis M47.816    Mitral valve insufficiency and aortic valve insufficiency I08.0    Mixed hyperlipidemia E78.2    Myopia of both eyes H52.13    Osteoporosis M81.0    Other chronic sinusitis J32.8    Primary open angle glaucoma (POAG) of both eyes, mild stage H40.1131    Primary osteoarthritis of right hip M16.11    Type 2 diabetes mellitus with unspecified diabetic retinopathy without macular edema (HCC) E11.319    Uncontrolled type 2 diabetes mellitus with diabetic peripheral angiopathy without gangrene, with long-term current use of insulin (HCC) E11.51, E11.65, Z79.4    Pressure ulcer of coccygeal region, stage 4 (HCC) L89.154    NSTEMI (non-ST elevated myocardial infarction) ([facility] I21.4    Hx of CABG Z95.1    Mild malnutrition (HCC) E44.1    Chronic diastolic congestive heart failure (HCC) I50.32    Surgical wound dehiscence, initial encounter (at [facility] SVG harvest site) T81.31XA       Assessment of today's active condition(s): RA, Hx back surgery, decreased mobility, stage 4 sacral pressure ulcer, no Hx of significant infection -- made some initial good progress, then stagnated, then I excised some deeper fibrotic tissue and things very briefly perked up, but stagnated again now - I'm not sure that she's offloaded as well as she could be; uses her ROHO cushion when seated, and tries to lie on her side when in bed, but I think her time spent seated is greater than it could be. Unrelated, but was also admitted for an acute coronary syndrome earlier this summer, underwent CABG, left SVG harvest, that wound didn't heal completely, eschar formed, complicated by abscess and cellulitis, taken to the OR for more aggressive excisional debridement, infection resolved, and that wound looks to be making good progress with VAC and compression, ongoing debridement, etc. Factors contributing to occurrence and/or persistence of the chronic ulcer include edema, diabetes, chronic pressure, decreased mobility, shear force and immunosuppression. Medical necessity of today's visit is shown by the above documentation. Sharp debridement is indicated today, based upon the exam findings in the wound(s) above. Procedure note:     Consent obtained. Time out performed per [facility] policy. Anesthetic  Anesthetic: 4% Lidocaine Cream     Using a curette, I sharply debrided the sacrum ulcer(s) down through and including the removal of dermis. The type(s) of tissue debrided included fibrin, biofilm and exudate.  Total

## 2021-08-03 ENCOUNTER — OFFICE VISIT (OUTPATIENT)
Dept: CARDIOTHORACIC SURGERY | Age: 71
End: 2021-08-03

## 2021-08-03 VITALS
DIASTOLIC BLOOD PRESSURE: 66 MMHG | HEIGHT: 70 IN | HEART RATE: 85 BPM | WEIGHT: 164 LBS | BODY MASS INDEX: 23.48 KG/M2 | OXYGEN SATURATION: 95 % | TEMPERATURE: 97.9 F | SYSTOLIC BLOOD PRESSURE: 126 MMHG

## 2021-08-03 DIAGNOSIS — I10 ESSENTIAL HYPERTENSION: ICD-10-CM

## 2021-08-03 DIAGNOSIS — Z87.891 HISTORY OF TOBACCO USE: ICD-10-CM

## 2021-08-03 DIAGNOSIS — Z46.89 ENCOUNTER FOR MANAGEMENT OF WOUND VAC: ICD-10-CM

## 2021-08-03 DIAGNOSIS — Z95.1 S/P CABG X 3: ICD-10-CM

## 2021-08-03 DIAGNOSIS — Z48.812 AFTERCARE FOLLOWING SURGERY OF THE CIRCULATORY SYSTEM: Primary | ICD-10-CM

## 2021-08-03 DIAGNOSIS — I25.10 CORONARY ARTERY DISEASE INVOLVING NATIVE CORONARY ARTERY OF NATIVE HEART WITHOUT ANGINA PECTORIS: ICD-10-CM

## 2021-08-03 PROCEDURE — 99024 POSTOP FOLLOW-UP VISIT: CPT | Performed by: THORACIC SURGERY (CARDIOTHORACIC VASCULAR SURGERY)

## 2021-08-03 NOTE — PROGRESS NOTES
Progress Note    CC:  Postoperative follow-up    S/P    CABG surgery. Subjective:    She is managing her wound VAC quite nicely. It is due to be changed tomorrow so I pulled it off today to look at her wound. Her eating and sleeping habits continue to improve postop surgery. She is also dealing with a long-term decubitus ulcer unrelated to her CABG surgery. No chest pain. Vital Signs:                                                 /66 (Site: Right Upper Arm, Position: Sitting, Cuff Size: Medium Adult)   Pulse 85   Temp 97.9 °F (36.6 °C) (Temporal)   Ht 5' 10\" (1.778 m)   Wt 164 lb (74.4 kg)   SpO2 95%   BMI 23.53 kg/m²        CV:   Regular rate and rhythm with no rubs or murmurs. Pulm: Clear lung fields with no rales or rhonchi. Incisions:    Sternal incision is clean, dry and intact. Sternum is stable. Abd:  Soft  Ext: Leg incision is clean and granulating in nicely. Brawny bilateral lower extremity peripheral edema. Assessment/Plan:  Overall doing very well post CABG surgery with a well-healing left lower extremity surgical wound. I discussed secondary risk prevention for cardiovascular disease with the patient. The patient has received a copy of my protocol for the secondary risk prevention of cardiovascular disease. The patient may increase activities as she feels comfortable doing so. Follow-up with her PCP and Dr. Yael Manning as prescribed. Follow-up with me in 2 weeks.     Akilah Regalado MD  8/3/2021  12:04 PM

## 2021-08-04 ENCOUNTER — HOSPITAL ENCOUNTER (OUTPATIENT)
Dept: WOUND CARE | Age: 71
Discharge: HOME | End: 2021-08-04
Payer: MEDICARE

## 2021-08-04 VITALS
WEIGHT: 150 LBS | HEIGHT: 70 IN | RESPIRATION RATE: 20 BRPM | HEART RATE: 99 BPM | SYSTOLIC BLOOD PRESSURE: 136 MMHG | DIASTOLIC BLOOD PRESSURE: 79 MMHG | TEMPERATURE: 97 F | BODY MASS INDEX: 21.47 KG/M2

## 2021-08-04 DIAGNOSIS — T81.31XA SURGICAL WOUND DEHISCENCE, INITIAL ENCOUNTER: Primary | ICD-10-CM

## 2021-08-04 DIAGNOSIS — L89.154 PRESSURE ULCER OF COCCYGEAL REGION, STAGE 4 (HCC): ICD-10-CM

## 2021-08-04 PROCEDURE — 11042 DBRDMT SUBQ TIS 1ST 20SQCM/<: CPT | Performed by: INTERNAL MEDICINE

## 2021-08-04 PROCEDURE — 97605 NEG PRS WND THER DME<=50SQCM: CPT

## 2021-08-04 PROCEDURE — 11042 DBRDMT SUBQ TIS 1ST 20SQCM/<: CPT

## 2021-08-04 PROCEDURE — 97597 DBRDMT OPN WND 1ST 20 CM/<: CPT | Performed by: INTERNAL MEDICINE

## 2021-08-04 PROCEDURE — 97597 DBRDMT OPN WND 1ST 20 CM/<: CPT

## 2021-08-04 RX ORDER — LIDOCAINE 40 MG/G
CREAM TOPICAL ONCE
Status: CANCELLED | OUTPATIENT
Start: 2021-08-04 | End: 2021-08-04

## 2021-08-04 RX ORDER — BACITRACIN ZINC AND POLYMYXIN B SULFATE 500; 1000 [USP'U]/G; [USP'U]/G
OINTMENT TOPICAL ONCE
Status: CANCELLED | OUTPATIENT
Start: 2021-08-04 | End: 2021-08-04

## 2021-08-04 RX ORDER — LIDOCAINE 50 MG/G
OINTMENT TOPICAL ONCE
Status: CANCELLED | OUTPATIENT
Start: 2021-08-04 | End: 2021-08-04

## 2021-08-04 RX ORDER — LIDOCAINE HYDROCHLORIDE 40 MG/ML
SOLUTION TOPICAL ONCE
Status: CANCELLED | OUTPATIENT
Start: 2021-08-04 | End: 2021-08-04

## 2021-08-04 RX ORDER — BACITRACIN ZINC AND POLYMYXIN B SULFATE 500; 1000 [USP'U]/G; [USP'U]/G
OINTMENT TOPICAL ONCE
Status: DISCONTINUED | OUTPATIENT
Start: 2021-08-04 | End: 2021-08-05 | Stop reason: HOSPADM

## 2021-08-04 RX ORDER — LIDOCAINE 40 MG/G
CREAM TOPICAL ONCE
Status: DISCONTINUED | OUTPATIENT
Start: 2021-08-04 | End: 2021-08-05 | Stop reason: HOSPADM

## 2021-08-04 ASSESSMENT — PAIN - FUNCTIONAL ASSESSMENT: PAIN_FUNCTIONAL_ASSESSMENT: PREVENTS OR INTERFERES SOME ACTIVE ACTIVITIES AND ADLS

## 2021-08-04 ASSESSMENT — PAIN DESCRIPTION - LOCATION: LOCATION: BUTTOCKS

## 2021-08-04 ASSESSMENT — PAIN DESCRIPTION - PROGRESSION: CLINICAL_PROGRESSION: GRADUALLY WORSENING

## 2021-08-04 ASSESSMENT — PAIN SCALES - GENERAL
PAINLEVEL_OUTOF10: 6
PAINLEVEL_OUTOF10: 0

## 2021-08-04 ASSESSMENT — PAIN DESCRIPTION - DESCRIPTORS: DESCRIPTORS: BURNING;SHARP

## 2021-08-04 ASSESSMENT — PAIN DESCRIPTION - DIRECTION: RADIATING_TOWARDS: DENIES

## 2021-08-04 ASSESSMENT — PAIN DESCRIPTION - PAIN TYPE: TYPE: CHRONIC PAIN

## 2021-08-04 ASSESSMENT — PAIN DESCRIPTION - ONSET: ONSET: ON-GOING

## 2021-08-04 ASSESSMENT — PAIN DESCRIPTION - FREQUENCY: FREQUENCY: INTERMITTENT

## 2021-08-04 NOTE — PLAN OF CARE
Both wounds debrided per MD & pt. Tolerated well. Will cont. With dressings & NPWT as ordered. Pt. Did mention that she wanted Dr Hernan Ortiz to go ahead and order a group II mattress. Dr Hernan Ortiz will send order to [facility] for the group II mattress. Pt. States she is trying to also spend less time sitting & more time on her side. Pt. Also states she is drinking 2 supplemental protein drinks daily to help with her nutritional intake. F/u in [facility] in 1 week as ordered, pt. Aware to call sooner with any changes or questions/concerns. Discharge instructions reviewed with patient, all questions answered, copy given to patient. Dressings were applied to all wounds per M.D. Instructions at this visit.

## 2021-08-04 NOTE — PLAN OF CARE
[facility] Physician Orders and Discharge [address]  [address], [address]  [place], [facility]  Telephone: [phone]      Fax: [phone] home care company:   [facility]     Your wound-care supplies will be provided by: We are ordering your wound-care supplies from [employer]. Please note, depending on your insurance coverage, you may have out-of-pocket expenses for these supplies. Someone from [employer] should call you to confirm your order and discuss those potential costs before they ship your products -- please anticipate that call. If your out-of-pocket cost could be substantial, [facility] has a financial hardship program for patients who qualify, so please ask about that if you might need a hand. If you have any questions about your supplies or your potential out-of-pocket costs, or if you need to place an order for a refill of supplies (typically monthly), please call [phone]. NAME:  Amanda Taveras   YOB: 1950  PRIMARY DIAGNOSIS FOR WOUND CARE CENTER:  Pressure ulcer     Wound cleansing:   Do not scrub or use excessive force. Wash hands with soap and water before and after dressing changes. Prior to applying a clean dressing, cleanse wound with normal saline, wound cleanser, or mild soap and water. Ask your physician or nurse before getting the wound(s) wet in the shower. Wound care for home:     Left Forearm:  PSO to wound  Benzoin to maegan-wound  Cover with mepilex border  Leave in place for the week.    May use a Spandagrip to help protect & keep dressing in place     LEFT MEDIAL LOWER LEG WOUND:  Vashe to wound  Flagyl crushed & sprinkled on wound bed as needed for malodor  AMD fluff gauze gently tucked into undermining & tunneled areas as marked per MD  Black foam to wound remaining wound  Pressure -125 mmHg continuous  Cotton stockinette from toes to knee  Surepress for pressure ulcers: *  Make sure you stay well-hydrated, and maintain good protein intake. *  Reposition at least every two hours, to keep from having pressure in one spot for too long. *  If you are not sure whether you have the best offloading surfaces (mattress, mattress overlay, chair cushion, heel-protector boots, etc), please ask. *  Moisturize your skin regularly with Vaseline, Aquaphor, Aveeno, CeraVe, Cetaphil, Eucerin, Lubriderm, etc; but keep the skin between your toes dry. *  If you smoke, your wound can not heal properly -- please talk with us when you're ready to quit. F/U Appointment is with Dr. Hailee Zambrano in 1 week on                                                at                       .     Your nurse  is ABRAHAM Zambrano      If we applied slip-resistant hospital socks today, be sure to remove them at least once a day to inspect your toes or feet, even if you're not changing the wraps or dressings underneath. If you see anything concerning (redness, excess moisture, etc), please call and let us know right away. Should you experience any significant changes in your wound(s) (including redness, increased warmth, increased pain, increased drainage, odor, or fever) or have questions about your wound care, please contact the [address] [place] [address] at [phone] Monday-Thursday from 8:00 am - 4:30 pm, or Friday from 8:00 am - 2:30 pm.  If you need help with your wound outside these hours and cannot wait until we are again available, contact your home-care company (if applicable), your PCP, or go to the nearest emergency room.

## 2021-08-05 NOTE — PROGRESS NOTES
[facility] Progress Note    Anjum Alvarado     : 1950    DATE OF VISIT:  2021    Subjective:     Anjum Alvarado is a 72 y.o. female who has a pressure ulcer located on the sacrum. Significant symptoms or pertinent wound history since last visit: feeling fatigued, weak, no fever; having trouble repositioning herself more often and more completely in bed, to stay off her ulcer. Uses her ROHO when seated, but is coming around to agree that a hospital bed and offloading mattress would make some more sense, to help her reposition better and keep her ulcer better offloaded. Drinking an extra protein shake each day now, which is good. Leg seems to be doing ok - she saw Dr. Meredith Nunez in follow-up yesterday. Off Abx now, no delayed side effects noted. She and her family and I have some ongoing concerns with home-care however -- in months gone by, they would sometimes miss visits with her; it sounds like there are always new nurses involved; there have definitely been issues with appropriate VAC placement (not placing drape underneath the foam bridge for the sacral wound), and this week a nurse told her that she couldn't apply her SurePress wrap for the leg edema, because she wasn't sure how to do it properly. Additional ulcer(s) noted? yes. That left lower leg surgical dehiscence / infection-related wound. Her current medication list consists of Acapella, DULoxetine, Insulin Syringe-Needle U-100, Roflumilast, Teriparatide (Recombinant), albuterol sulfate HFA, aspirin EC, atorvastatin, blood glucose test strips, calcium carbonate, cetirizine, clopidogrel, cyclobenzaprine, docusate sodium, fluticasone, gabapentin, insulin glargine, insulin lispro, ipratropium, latanoprost, metoprolol tartrate, morphine, naloxone, omeprazole, oxyCODONE-acetaminophen, predniSONE, and vitamin D.     Allergies: Atenolol    Objective:     Vitals:    21 0914 21 0920   BP:  136/79   Pulse: 99   Resp:  20   Temp:  97 °F (36.1 °C)   TempSrc:  Oral   Weight: 150 lb (68 kg)    Height: 5' 10\" (1.778 m)      AAOx3, fatigued, has lost some weight, NAD  Intact distal pulses, left foot warm, good cap refill  Mild LLE edema  No cellulitis, angitis, fluctuance, no allodynia around the wound  No acute arthritis or bursitis; no thrush or drug rash  Blanca-ulcer skin: indurated, pink, warm and fading ecchymotic changes at the left leg, but recurrent areas of drape dermatitis, moisture and denudation around the sacral ulcer. Ulcer(s): lower leg wound doing ok, a bit smaller, a bit less undermining, mostly granular, a bit of fat necrosis, some fibrin and biofilm, minimal fascial exposure now (not necrotic), no purulence; sacral wound more stagnant, mostly pink, a couple of small areas of healthier, fine red granulation, but still a large area of undermining, some deeper inflamed, nodular fibrotic tissue that I think is coming from incomplete offloading and shear, some fibrinous exudate, no pus, no bone exposure. Photos also saved in electronic chart.     Today's wound measurements, per RN documentation:  Wound 07/07/21 #3, left medial leg, dehisced surgical, full thickness, onset 5/2021-Wound Length (cm): 7 cm  Wound 12/18/20 #2, Sacrum, Pressure Injury, Stage 4, Onset 5/2020-Wound Length (cm): 4 cm    Wound 07/07/21 #3, left medial leg, dehisced surgical, full thickness, onset 5/2021-Wound Width (cm): 2.3 cm  Wound 12/18/20 #2, Sacrum, Pressure Injury, Stage 4, Onset 5/2020-Wound Width (cm): 2 cm    Wound 07/07/21 #3, left medial leg, dehisced surgical, full thickness, onset 5/2021-Wound Depth (cm): 1.2 cm  Wound 12/18/20 #2, Sacrum, Pressure Injury, Stage 4, Onset 5/2020-Wound Depth (cm): 1.5 cm    Assessment:     Patient Active Problem List   Diagnosis Code    [facility] M06.9    Psoriasis L40.9    GERD (gastroesophageal reflux disease) K21.9    History of tobacco use Z87.891    Hyponatremia E87.1    Anemia D64.9    Cylindrical bronchiectasis (HCC) J47.9    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    Immunocompromised state ([facility] D84.9    Hypokalemia E87.6    Coronary artery disease I25.10    Bilateral lower extremity edema R60.0    Essential hypertension I10    Lumbar spondylosis M47.816    Mitral valve insufficiency and aortic valve insufficiency I08.0    Mixed hyperlipidemia E78.2    Myopia of both eyes H52.13    Osteoporosis M81.0    Other chronic sinusitis J32.8    Primary open angle glaucoma (POAG) of both eyes, mild stage H40.1131    Primary osteoarthritis of right hip M16.11    Type 2 diabetes mellitus with unspecified diabetic retinopathy without macular edema ([facility]) E11.319    Uncontrolled type 2 diabetes mellitus with diabetic peripheral angiopathy without gangrene, with long-term current use of insulin ([facility]) E11.51, E11.65, Z79.4    Pressure ulcer of coccygeal region, stage 4 ([facility]) L89.154    NSTEMI (non-ST elevated myocardial infarction) ([facility] I21.4    Hx of CABG Z95.1    Mild malnutrition ([facility]) E44.1    Chronic diastolic congestive heart failure ([facility]) I50.32    Surgical wound dehiscence, initial encounter (at [facility] SVG harvest site) T81.31XA       Assessment of today's active condition(s): RA, Hx back surgery, decreased mobility, sacral DTI, excised in the OR, evolved to a stage 4 pressure ulcer, no signs of infection, but healing very slowly, and I do not think adequately offloaded in her current bed. Recent acute coronary syndrome, CABG, sternal wound did well, but SVG harvest wound developed an eschar, then deep soft tissue infection, went to the OR for aggressive excision and drainage, completed Abx, and that wound is doing well with debridement, compression and VAC. Factors contributing to occurrence and/or persistence of the chronic ulcer include edema, diabetes, chronic pressure, decreased mobility, shear force and immunosuppression.  Medical necessity of today's visit is shown by the above documentation. Sharp debridement is indicated today, based upon the exam findings in the wound(s) above. Procedure note:     Consent obtained. Time out performed per [facility]. Anesthetic  Anesthetic: 4% Lidocaine Cream     Using a curette, I sharply debrided the left  lower leg ulcer(s) down through and including the removal of subcutaneous tissue. The type(s) of tissue debrided included fibrin, biofilm and necrotic/eschar. Total Surface Area Debrided: 14 sq cm. Using a curette, I sharply debrided the sacrum ulcer(s) down through and including the removal of dermis. The type(s) of tissue debrided included fibrin and exudate. Total Surface Area Debrided: 8 sq cm. The ulcers were then irrigated with normal saline solution. The procedure was completed with a small amount of bleeding, and hemostasis was with pressure. The patient tolerated the procedure well, with no significant complications. The patient's level of pain during and after the procedure was monitored. Post-debridement measurements, if different from pre-debridement, are in the flowsheet as well. Discharge plan:     Treatment in the wound care center today, per RN documentation: Wound 07/07/21 #3, left medial leg, dehisced surgical, full thickness, onset 5/2021-Dressing/Treatment:  (Flagyl, AMD gauze,NPWT,stockingette,surepress)  Wound 12/18/20 #2, Sacrum, Pressure Injury, Stage 4, Onset 5/2020-Dressing/Treatment: Other (comment) (Hydrocolloid to right hip, Flagyl, purachol ag, NPWT). Will contact a local [employer] company about a semi-electric hospital bed (hospital bed needed because of poor mobility and difficulty in transferring in and out of bed due to RA, OA, generalized weakness, slow recovery from CABG. Needs to be a semi-electric bed so that she can reposition adequately during the night. Will then be ordering a nonpowered group II mattress for better offloading. When seated, continue that ROHO cushion.   Keep pushing with protein intake. Keep up with diabetic diet; recent A1c not perfect (8.1%), but not too high. Continue to hold Enbrel for now, as long as tolerated in terms of her RA and pain. Continue to elevate left leg when possible, to help with edema and wound healing there. Home treatment: good handwashing before and after any dressing changes. Cleanse wound with saline or soap & water before dressing change. May use Vaseline (petrolatum), Aquaphor, Aveeno, CeraVe, Cetaphil, Eucerin, Lubriderm, etc for dry skin. Dressing type for home: as above, three times weekly. Written discharge instructions given to patient. Follow up in 1 week.     Electronically signed by Jasen Mays MD on 8/5/2021 at 5:24 AM.

## 2021-08-09 ENCOUNTER — HOSPITAL ENCOUNTER (OUTPATIENT)
Age: 71
Discharge: HOME | End: 2021-08-09
Payer: MEDICARE

## 2021-08-09 ENCOUNTER — HOSPITAL ENCOUNTER (OUTPATIENT)
Dept: GENERAL RADIOLOGY | Age: 71
Discharge: HOME | End: 2021-08-09
Payer: MEDICARE

## 2021-08-09 ENCOUNTER — TELEPHONE (OUTPATIENT)
Dept: PULMONOLOGY | Age: 71
End: 2021-08-09

## 2021-08-09 DIAGNOSIS — R06.02 SOB (SHORTNESS OF BREATH): Primary | ICD-10-CM

## 2021-08-09 DIAGNOSIS — R06.02 SOB (SHORTNESS OF BREATH): ICD-10-CM

## 2021-08-09 PROCEDURE — 71046 X-RAY EXAM CHEST 2 VIEWS: CPT

## 2021-08-09 NOTE — TELEPHONE ENCOUNTER
Called patient and spoke to patient and daughter and informed of  recommendation. Daughter stated that they will take patient to ED if they feel like it's necessary and that patient 'isn't gasping for air she's just short of breath and like she's not having covid symptoms\". Informed daughter that Bruna Sandhu review all the symptoms and that his recommendation is that patient be evaluated in ED and daughter asked what time the appointment with Juan Carlos Toro was on Thursday.

## 2021-08-09 NOTE — TELEPHONE ENCOUNTER
Appt [facility] 8/10/21; cxr order placed patient aware.

## 2021-08-09 NOTE — TELEPHONE ENCOUNTER
Pt daughter called stating that pt has an appt with Dr. Branden Martin this week, but over the weekend pt started with worsening shortness of breath and heaviness in her chest.  Pt brother is in the hospital currently with pneumonia and pt is concerned about having an infection. Please advise. Do you have the following symptoms? Shortness of Breath  Yes, with minimal exertion  Wheezing  no  Cough  yes  Cough Characteristics:  Productive    barely  Sputum Color    Tinge of yellow  Hemoptysis   no  Consistency of sputum   Very thick     Fever:    no    Temp:n/a  Chills/Sweats:  no  What other symptoms are you having?:  Heaviness in chest    How long have you had these symptoms? 3 days, worsening over the weekend     Pharmacy: "[facility]. [place]    Have you been vaccinated for covid? Yes    Are you able to come in for an appt today? No  Are you available for a VV today? No          Review medications and allergies: Allergies? Atenolol        Currently on Antibiotics? (Drug/Dose/Frequency and how long on?) Azithromycin 250 mg daily        Systemic Steroids? (Drug/Dose/Frequency and how long on?) Prednisone 4 mg daily for Rheumatoid     Last sick call taken on n/a. Meds prescribed were n/a. Last OV 5/18/21 with Dr. Branden Martin   (pull in last visit note assessment/plan)   Assessment:   · COPD/Chronic bronchitis/cough with cylindrical bronchiectasis  · Centrilobular pulmonary emphysema  · Mild DANIELLE/REM related sleep disordered breathing  · CAD s/p CABG 5/3/21  · Tracheomalacia    · Lower extremity edema  · Pulmonary Nodules have resolved  · Rheumatoid arthritis on Embrel and MTX and 4 mg prednisone  · Positive tobacco history, 20 pack year quit in 1991  · Diabetes                Plan:   · Continue azithromycin daily  · Continue Daliresp; off Singulair  -- Failed Singulair, Failed Dulera, Failed Spiriva, Failed Advair. · I recommended regular use of CPAP. We will see if we can get this set back up. · See me in 6 months - please call patient this afternoon to schedule

## 2021-08-10 ENCOUNTER — OFFICE VISIT (OUTPATIENT)
Dept: PULMONOLOGY | Age: 71
End: 2021-08-10
Payer: MEDICARE

## 2021-08-10 ENCOUNTER — HOSPITAL ENCOUNTER (EMERGENCY)
Age: 71
Discharge: ANOTHER HOSPITAL | End: 2021-08-11
Attending: STUDENT IN AN ORGANIZED HEALTH CARE EDUCATION/TRAINING PROGRAM
Payer: MEDICARE

## 2021-08-10 ENCOUNTER — APPOINTMENT (OUTPATIENT)
Dept: GENERAL RADIOLOGY | Age: 71
End: 2021-08-10
Payer: MEDICARE

## 2021-08-10 VITALS
DIASTOLIC BLOOD PRESSURE: 72 MMHG | HEIGHT: 68 IN | HEART RATE: 83 BPM | BODY MASS INDEX: 23.49 KG/M2 | OXYGEN SATURATION: 92 % | TEMPERATURE: 97.6 F | SYSTOLIC BLOOD PRESSURE: 128 MMHG | RESPIRATION RATE: 20 BRPM | WEIGHT: 155 LBS

## 2021-08-10 DIAGNOSIS — J96.01 ACUTE RESPIRATORY FAILURE WITH HYPOXEMIA (HCC): Primary | ICD-10-CM

## 2021-08-10 DIAGNOSIS — J96.00 ACUTE RESPIRATORY FAILURE, UNSP W HYPOXIA OR HYPERCAPNIA (HCC): Primary | ICD-10-CM

## 2021-08-10 DIAGNOSIS — I50.9 ACUTE ON CHRONIC CONGESTIVE HEART FAILURE, UNSPECIFIED HEART FAILURE TYPE (HCC): ICD-10-CM

## 2021-08-10 LAB
A/G RATIO: 0.8 (ref 1.1–2.2)
ALBUMIN SERPL-MCNC: 3.5 G/DL (ref 3.4–5)
ALP BLD-CCNC: 154 U/L (ref 40–129)
ALT SERPL-CCNC: 9 U/L (ref 10–40)
ANION GAP SERPL CALCULATED.3IONS-SCNC: 12 MMOL/L (ref 3–16)
AST SERPL-CCNC: 11 U/L (ref 15–37)
BASE EXCESS ARTERIAL: -2 MMOL/L (ref -3–3)
BASOPHILS ABSOLUTE: 0.1 K/UL (ref 0–0.2)
BASOPHILS RELATIVE PERCENT: 1 %
BILIRUB SERPL-MCNC: 0.6 MG/DL (ref 0–1)
BUN BLDV-MCNC: 12 MG/DL (ref 7–20)
CALCIUM SERPL-MCNC: 9.3 MG/DL (ref 8.3–10.6)
CARBOXYHEMOGLOBIN ARTERIAL: 1.5 % (ref 0–1.5)
CHLORIDE BLD-SCNC: 97 MMOL/L (ref 99–110)
CO2: 24 MMOL/L (ref 21–32)
CREAT SERPL-MCNC: 0.7 MG/DL (ref 0.6–1.2)
EOSINOPHILS ABSOLUTE: 0.3 K/UL (ref 0–0.6)
EOSINOPHILS RELATIVE PERCENT: 4.8 %
GFR AFRICAN AMERICAN: >60
GFR NON-AFRICAN AMERICAN: >60
GLOBULIN: 4.5 G/DL
GLUCOSE BLD-MCNC: 235 MG/DL (ref 70–99)
HCO3 ARTERIAL: 21.8 MMOL/L (ref 21–29)
HCT VFR BLD CALC: 30.9 % (ref 36–48)
HEMOGLOBIN, ART, EXTENDED: 9.4 G/DL (ref 12–16)
HEMOGLOBIN: 9.9 G/DL (ref 12–16)
LYMPHOCYTES ABSOLUTE: 1.4 K/UL (ref 1–5.1)
LYMPHOCYTES RELATIVE PERCENT: 24.8 %
MCH RBC QN AUTO: 27.1 PG (ref 26–34)
MCHC RBC AUTO-ENTMCNC: 32.1 G/DL (ref 31–36)
MCV RBC AUTO: 84.2 FL (ref 80–100)
METHEMOGLOBIN ARTERIAL: 0.3 %
MONOCYTES ABSOLUTE: 0.4 K/UL (ref 0–1.3)
MONOCYTES RELATIVE PERCENT: 7.5 %
NEUTROPHILS ABSOLUTE: 3.4 K/UL (ref 1.7–7.7)
NEUTROPHILS RELATIVE PERCENT: 61.9 %
O2 SAT, ARTERIAL: 84.6 %
O2 THERAPY: ABNORMAL
PCO2 ARTERIAL: 33.3 MMHG (ref 35–45)
PDW BLD-RTO: 17 % (ref 12.4–15.4)
PH ARTERIAL: 7.43 (ref 7.35–7.45)
PLATELET # BLD: 304 K/UL (ref 135–450)
PMV BLD AUTO: 7.9 FL (ref 5–10.5)
PO2 ARTERIAL: 46.8 MMHG (ref 75–108)
POTASSIUM REFLEX MAGNESIUM: 4.1 MMOL/L (ref 3.5–5.1)
PRO-BNP: ABNORMAL PG/ML (ref 0–124)
RBC # BLD: 3.67 M/UL (ref 4–5.2)
SARS-COV-2, NAAT: NOT DETECTED
SODIUM BLD-SCNC: 133 MMOL/L (ref 136–145)
TCO2 ARTERIAL: 22.8 MMOL/L
TOTAL PROTEIN: 8 G/DL (ref 6.4–8.2)
TROPONIN: 0.03 NG/ML
TROPONIN: 0.03 NG/ML
WBC # BLD: 5.5 K/UL (ref 4–11)

## 2021-08-10 PROCEDURE — 99214 OFFICE O/P EST MOD 30 MIN: CPT | Performed by: INTERNAL MEDICINE

## 2021-08-10 PROCEDURE — 99284 EMERGENCY DEPT VISIT MOD MDM: CPT

## 2021-08-10 PROCEDURE — 80053 COMPREHEN METABOLIC PANEL: CPT

## 2021-08-10 PROCEDURE — 87635 SARS-COV-2 COVID-19 AMP PRB: CPT

## 2021-08-10 PROCEDURE — 93005 ELECTROCARDIOGRAM TRACING: CPT | Performed by: NURSE PRACTITIONER

## 2021-08-10 PROCEDURE — 85025 COMPLETE CBC W/AUTO DIFF WBC: CPT

## 2021-08-10 PROCEDURE — 96374 THER/PROPH/DIAG INJ IV PUSH: CPT

## 2021-08-10 PROCEDURE — 6370000000 HC RX 637 (ALT 250 FOR IP): Performed by: STUDENT IN AN ORGANIZED HEALTH CARE EDUCATION/TRAINING PROGRAM

## 2021-08-10 PROCEDURE — 6360000002 HC RX W HCPCS: Performed by: NURSE PRACTITIONER

## 2021-08-10 PROCEDURE — 83880 ASSAY OF NATRIURETIC PEPTIDE: CPT

## 2021-08-10 PROCEDURE — 82803 BLOOD GASES ANY COMBINATION: CPT

## 2021-08-10 PROCEDURE — 84484 ASSAY OF TROPONIN QUANT: CPT

## 2021-08-10 PROCEDURE — 71045 X-RAY EXAM CHEST 1 VIEW: CPT

## 2021-08-10 RX ORDER — MORPHINE SULFATE 15 MG/1
15 TABLET, FILM COATED, EXTENDED RELEASE ORAL ONCE
Status: COMPLETED | OUTPATIENT
Start: 2021-08-10 | End: 2021-08-10

## 2021-08-10 RX ORDER — FUROSEMIDE 10 MG/ML
40 INJECTION INTRAMUSCULAR; INTRAVENOUS ONCE
Status: COMPLETED | OUTPATIENT
Start: 2021-08-10 | End: 2021-08-10

## 2021-08-10 RX ORDER — ATORVASTATIN CALCIUM 40 MG/1
40 TABLET, FILM COATED ORAL NIGHTLY
Status: DISCONTINUED | OUTPATIENT
Start: 2021-08-10 | End: 2021-08-11 | Stop reason: HOSPADM

## 2021-08-10 RX ORDER — ASPIRIN 81 MG/1
243 TABLET, CHEWABLE ORAL ONCE
Status: COMPLETED | OUTPATIENT
Start: 2021-08-10 | End: 2021-08-10

## 2021-08-10 RX ORDER — GABAPENTIN 300 MG/1
300 CAPSULE ORAL ONCE
Status: COMPLETED | OUTPATIENT
Start: 2021-08-10 | End: 2021-08-10

## 2021-08-10 RX ADMIN — ASPIRIN 243 MG: 81 TABLET, CHEWABLE ORAL at 23:25

## 2021-08-10 RX ADMIN — MORPHINE SULFATE 15 MG: 15 TABLET, EXTENDED RELEASE ORAL at 23:25

## 2021-08-10 RX ADMIN — GABAPENTIN 300 MG: 300 CAPSULE ORAL at 23:25

## 2021-08-10 RX ADMIN — ATORVASTATIN CALCIUM 40 MG: 40 TABLET, FILM COATED ORAL at 23:25

## 2021-08-10 RX ADMIN — FUROSEMIDE 40 MG: 10 INJECTION, SOLUTION INTRAMUSCULAR; INTRAVENOUS at 18:50

## 2021-08-10 ASSESSMENT — ENCOUNTER SYMPTOMS
SORE THROAT: 0
CHEST TIGHTNESS: 1
ABDOMINAL PAIN: 0
COLOR CHANGE: 0
RHINORRHEA: 0
SHORTNESS OF BREATH: 1
NAUSEA: 0
VOMITING: 0

## 2021-08-10 ASSESSMENT — SLEEP AND FATIGUE QUESTIONNAIRES
HOW LIKELY ARE YOU TO NOD OFF OR FALL ASLEEP WHILE WATCHING TV: 3
HOW LIKELY ARE YOU TO NOD OFF OR FALL ASLEEP WHILE SITTING AND TALKING TO SOMEONE: 0
HOW LIKELY ARE YOU TO NOD OFF OR FALL ASLEEP WHEN YOU ARE A PASSENGER IN A CAR FOR AN HOUR WITHOUT A BREAK: 3
HOW LIKELY ARE YOU TO NOD OFF OR FALL ASLEEP WHILE SITTING AND READING: 3
ESS TOTAL SCORE: 13
HOW LIKELY ARE YOU TO NOD OFF OR FALL ASLEEP WHILE SITTING QUIETLY AFTER LUNCH WITHOUT ALCOHOL: 2
NECK CIRCUMFERENCE (INCHES): 13.5
HOW LIKELY ARE YOU TO NOD OFF OR FALL ASLEEP WHILE LYING DOWN TO REST IN THE AFTERNOON WHEN CIRCUMSTANCES PERMIT: 2
HOW LIKELY ARE YOU TO NOD OFF OR FALL ASLEEP WHILE SITTING INACTIVE IN A PUBLIC PLACE: 0
HOW LIKELY ARE YOU TO NOD OFF OR FALL ASLEEP IN A CAR, WHILE STOPPED FOR A FEW MINUTES IN TRAFFIC: 0

## 2021-08-10 ASSESSMENT — PAIN DESCRIPTION - LOCATION: LOCATION: BACK

## 2021-08-10 ASSESSMENT — PAIN SCALES - GENERAL
PAINLEVEL_OUTOF10: 8
PAINLEVEL_OUTOF10: 8

## 2021-08-10 NOTE — ED PROVIDER NOTES
[address] ED  EMERGENCY DEPARTMENT ENCOUNTER        Pt Name: Cheko Cline  MRN: [id number]  [place] 1950  Dateof evaluation: 8/10/2021  Provider: LORENZO Martinez - [place]  PCP: Ewa Pena MD  ED Attending: No att. providers found    CHIEF COMPLAINT       Chief Complaint   Patient presents with    Shortness of Breath     sent by Dr Abrahan Cain for fluid around heart and lungs; had xray yesterday       HISTORY OF PRESENTILLNESS   (Location/Symptom, Timing/Onset, Context/Setting, Quality, Duration, Modifying Factors, Severity)  Note limiting factors. Cheko Cline is a 72 y.o. female for shortness of breath. Onset was yesterday. Context includes family reports the patient has had some increasing shortness of breath for a while however yesterday became more severe. Patient does not use oxygen at home. Patient does follow with pulmonary. Family reports they did a chest x-ray and the patient was sent to the ED to be evaluated. Patient does not use Lasix on a regular basis but as needed. Patient does use 4 mg of prednisone for her arthritis daily. Patient does have some discomfort in the left side of her chest. Alleviating factors include nothing. Aggravating factors include nothing. Pain is 8/10. Nothing has been used for pain today. Nursing Notes were all reviewed and agreed with or any disagreements were addressed  in the HPI. REVIEW OF SYSTEMS    (2-9 systems for level 4, 10 or more for level 5)     Review of Systems   Constitutional: Negative for fever. HENT: Negative for congestion, rhinorrhea and sore throat. Respiratory: Positive for chest tightness and shortness of breath. Cardiovascular: Negative for chest pain. Gastrointestinal: Negative for abdominal pain, nausea and vomiting. Genitourinary: Negative for decreased urine volume and difficulty urinating. Musculoskeletal: Negative for arthralgias and myalgias.    Skin: Negative for color change and rash. Neurological: Negative for dizziness and light-headedness. Psychiatric/Behavioral: Negative for agitation. All other systems reviewed and are negative. Positives and Pertinent negatives as per HPI. Except as noted above in the ROS, all other systems were reviewed and negative. PAST MEDICAL HISTORY     Past Medical History:   Diagnosis Date    Atherosclerosis of native artery of right lower extremity with rest pain ([address] 07/25/2017    Back pain     Branch retinal vein occlusion 07/20/2012    Bronchiectasis with acute exacerbation (HCC)     Cellulitis and abscess of left leg 7/11/2021    Cellulitis of left lower extremity 7/11/2021    S/P vein harvest 05/2021 for CABG    Closed compression fracture of thoracic vertebra ([address] 01/15/2020    Closed fracture of facial bone with routine healing 11/21/2016    Closed jaw fracture ([address] 01/15/2020    Community acquired pneumonia of left lower lobe of lung     Compression fracture of L1 lumbar vertebra ([address] 01/15/2020    COPD (chronic obstructive pulmonary disease) (HCC)     Fracture of tibial plateau, closed, left, initial encounter 12/05/2017    Minimally displaced zone I fracture of sacrum (HCC) 09/02/2020    MRSA (methicillin resistant staph aureus) culture positive 07/2021    MRSA (methicillin resistant Staphylococcus aureus) 07/13/2021    wound    Mucus plugging of bronchi     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 Bone density test last year showed severe osteoporosis. Recently, two broken vertebrae (L1, L2) due to coughing. Diagnosed with tracheomalacia and stated she must cough very hard to clear phlegm. Was coughing due to upper respiratory infections which have been treated. Hx of laminectomy and recent kyphoplasty.    Refractured her jawbone which was previously repaired wi    Post herpetic neuralgia     Proximal humerus fracture 10/01/2019    Rheumatoid arthritis ([address]     Shingles 05/2020    Sleep apnea     Status post incision and drainage 07/2021    Left Leg    Temporal arteritis ([address] 07/10/2013    Tobacco use 10/19/2017    Tracheomalacia     Vitreous hemorrhage, right eye ([address] 02/21/2020         SURGICAL HISTORY       Past Surgical History:   Procedure Laterality Date    ARTERY BIOPSY Right 03/01/2021    at [address]  05/30/2013    left temporal artery biopsy    BACK SURGERY  08/2020    BRONCHOSCOPY      BRONCHOSCOPY N/A 06/12/2019    BRONCHOSCOPY ALVEOLAR LAVAGE performed by Baldemar Keating MD at [address]  05/03/2021    CATARACT REMOVAL      CORONARY ARTERY BYPASS GRAFT N/A 05/03/2021    CORONARY ARTERY BYPASS X3 WITH LEFT ATRIAL APPENDAGE CLIP, 5 LEVEL BILATERAL INTERCOSTAL NERVE BLOCK, STERNAL PLATING performed by Thuy Molina MD at [facility]      HERNIA REPAIR      IR MIDLINE CATH  7/14/2021    IR MIDLINE CATH 7/14/2021 [facility] SPECIAL PROCEDURES    KNEE ARTHROSCOPY      left    KYPHOSIS SURGERY      LAMINECTOMY      LEG SURGERY Left 7/13/2021    INCISION AND DEBRIDEMENT LEFT LOWER LEG WOUND WITH POSSIBLE WOUND VAC PLACEMENT performed by Juanito Coppola MD at [address] [place] [address]  02/2020    MEDIASTINOSCOPY N/A 05/05/2021    MEDIASTINAL EXPLORATION AND EVACUATION OF HEMATOMA performed by Thuy Molina MD at [place] [address].  01/08/2021    sacral wound debridement    PRESSURE ULCER DEBRIDEMENT N/A 01/08/2021    SACRAL WOUND DEBRIDEMENT performed by Trenton Booth MD at  [facility]  05/07/2013    FESS with balloon    SPINAL FUSION      TUBAL LIGATION      UPPER GASTROINTESTINAL ENDOSCOPY  04/08/2014    Dilitation         CURRENT MEDICATIONS Previous Medications    ACCU-CHEK CEDRICK PLUS STRIP    TEST 4 TIMES DAILY    ALBUTEROL SULFATE  (90 BASE) MCG/ACT INHALER    INHALE 2 PUFFS INTO THE LUNGS EVERY 4 HOURS AS NEEDED FOR WHEEZING    ASPIRIN EC 81 MG EC TABLET    Take 1 tablet by mouth daily    ATORVASTATIN (LIPITOR) 40 MG TABLET    Take 40 mg by mouth    CALCIUM CARBONATE (OSCAL) 500 MG TABS TABLET    Take 500 mg by mouth daily    CETIRIZINE (ZYRTEC) 10 MG TABLET    Take 10 mg by mouth daily. CLOPIDOGREL (PLAVIX) 75 MG TABLET    Take 1 tablet by mouth daily    CYCLOBENZAPRINE (FLEXERIL) 10 MG TABLET    Take 10 mg by mouth 2 times daily as needed     DALIRESP 500 MCG TABLET    TAKE 1 TABLET BY MOUTH DAILY    DOCUSATE SODIUM (COLACE) 100 MG CAPSULE    Take 100 mg by mouth 2 times daily as needed for Constipation    DULOXETINE (CYMBALTA) 60 MG EXTENDED RELEASE CAPSULE    Take 60 mg by mouth daily    FLUTICASONE (FLONASE) 50 MCG/ACT NASAL SPRAY    INHALE 2 SPRAYS IN EACH NOSTRIL DAILY    GABAPENTIN (NEURONTIN) 300 MG CAPSULE    Take 300 mg by mouth 2 times daily. INSULIN GLARGINE (LANTUS) 100 UNIT/ML INJECTION VIAL    Inject 15 Units into the skin nightly    INSULIN LISPRO (HUMALOG) 100 UNIT/ML INJECTION VIAL    Inject 0-12 Units into the skin 3 times daily (with meals)    INSULIN SYRINGE-NEEDLE U-100 31G X 5/16\" 0.5 ML MISC    USE 5 TIMES DAILY    IPRATROPIUM (ATROVENT) 0.06 % NASAL SPRAY    USE 2 SPRAYS BY NASAL ROUTE 2-4 TIMES DAILY    LATANOPROST (XALATAN) 0.005 % OPHTHALMIC SOLUTION    Place 1 drop into both eyes nightly    METOPROLOL TARTRATE (LOPRESSOR) 25 MG TABLET    Take 0.5 tablets by mouth 2 times daily Hold this medication for pulse <73 or systolic BP <068    MISC. DEVICES (ACAPELLA) MISC    Take 1 Device by mouth as needed    MORPHINE (MS CONTIN) 15 MG EXTENDED RELEASE TABLET    15 mg 2 times daily.      NARCAN 4 MG/0.1ML LIQD NASAL SPRAY    as needed     OMEPRAZOLE (PRILOSEC) 40 MG CAPSULE    Take 40 mg by mouth daily OXYCODONE-ACETAMINOPHEN (PERCOCET)  MG PER TABLET    Take 1 tablet by mouth daily. PREDNISONE (DELTASONE) 1 MG TABLET    Take 4 mg by mouth daily    TERIPARATIDE, RECOMBINANT, (FORTEO) 600 MCG/2.4ML SOPN INJECTION    Inject 20 mcg into the skin daily    VITAMIN D (CHOLECALCIFEROL) 1000 UNIT TABS TABLET    Take 5,000 Units by mouth daily          ALLERGIES     Atenolol    FAMILY HISTORY       Family History   Problem Relation Age of Onset    Diabetes Mother     Hypertension Mother     Asthma Other     Heart Disease Brother     Diabetes Brother     Diabetes Sister     Heart Disease Brother     Cancer Neg Hx     Emphysema Neg Hx     Heart Failure Neg Hx           SOCIAL HISTORY       Social History     Socioeconomic History    Marital status:      Spouse name: Not on file    Number of children: Not on file    Years of education: Not on file    Highest education level: Not on file   Occupational History    Not on file   Tobacco Use    Smoking status: Former Smoker     Packs/day: 1.00     Years: 20.00     Pack years: 20.00     Types: Cigarettes     Quit date: 1991     Years since quittin.3    Smokeless tobacco: Never Used   Vaping Use    Vaping Use: Never used   Substance and Sexual Activity    Alcohol use: Not Currently     Alcohol/week: 0.0 standard drinks     Comment: rarely    Drug use: No    Sexual activity: Not on file   Other Topics Concern    Not on file   Social History Narrative    Not on file     Social Determinants of Health     Financial Resource Strain:     Difficulty of Paying Living Expenses:    Food Insecurity:     Worried About Running Out of Food in the Last Year:     [address] in the Last Year:    Transportation Needs:     Lack of Transportation (Medical):      Lack of Transportation (Non-Medical):    Physical Activity:     Days of Exercise per Week:     Minutes of Exercise per Session:    Stress:     Feeling of Stress :    Social Connections:     Frequency of Communication with Friends and Family:     Frequency of Social Gatherings with Friends and Family:     Attends [employer] Services:     Active Member of Clubs or Organizations:     Attends Club or Organization Meetings:     Marital Status:    Intimate Partner Violence:     Fear of Current or Ex-Partner:     Emotionally Abused:     Physically Abused:     Sexually Abused:        SCREENINGS             PHYSICAL EXAM  (up to 7 for level 4, 8 or more for level 5)     ED Triage Vitals [08/10/21 1623]   BP Temp Temp Source Pulse Resp SpO2 Height Weight   117/64 98.3 °F (36.8 °C) Oral 79 16 95 % 5' 10\" (1.778 m) 155 lb (70.3 kg)       Physical Exam  Constitutional:       Appearance: She is well-developed. Comments: Chronically ill-appearing patient   HENT:      Head: Normocephalic and atraumatic. Cardiovascular:      Rate and Rhythm: Normal rate. Pulmonary:      Effort: Accessory muscle usage and respiratory distress present. Breath sounds: Examination of the right-middle field reveals rales. Examination of the left-middle field reveals rales. Examination of the right-lower field reveals rales. Examination of the left-lower field reveals rales. Rales present. Abdominal:      General: There is no distension. Palpations: Abdomen is soft. Tenderness: There is no abdominal tenderness. Musculoskeletal:         General: Normal range of motion. Cervical back: Normal range of motion. Skin:     General: Skin is warm and dry. Neurological:      Mental Status: She is alert and oriented to person, place, and time.          DIAGNOSTIC RESULTS   LABS:    Labs Reviewed   CBC WITH AUTO DIFFERENTIAL - Abnormal; Notable for the following components:       Result Value    RBC 3.67 (*)     Hemoglobin 9.9 (*)     Hematocrit 30.9 (*)     RDW 17.0 (*)     All other components within normal limits    Narrative:     Performed at:  [facility]  [facility],  [place], [employer]   Phone [phone]   COMPREHENSIVE METABOLIC PANEL W/ REFLEX TO MG FOR LOW K - Abnormal; Notable for the following components:    Sodium 133 (*)     Chloride 97 (*)     Glucose 235 (*)     Albumin/Globulin Ratio 0.8 (*)     Alkaline Phosphatase 154 (*)     ALT 9 (*)     AST 11 (*)     All other components within normal limits    Narrative:     Performed at:  [place] [address],  [place], [place] [facility]   Phone [phone]   BRAIN NATRIURETIC PEPTIDE - Abnormal; Notable for the following components:    Pro-BNP 20,636 (*)     All other components within normal limits    Narrative:     Performed at:  [place] [address],  [place], [facility]   Phone [phone]   TROPONIN - Abnormal; Notable for the following components:    Troponin 0.03 (*)     All other components within normal limits    Narrative:     Performed at:  [place] [address],  [place], [place] [employer]   Phone [phone]   BLOOD GAS, ARTERIAL - Abnormal; Notable for the following components:    pCO2, Arterial 33.3 (*)     pO2, Arterial 46.8 (*)     Hemoglobin, Art, Extended 9.4 (*)     O2 Sat, Arterial 84.6 (*)     All other components within normal limits    Narrative:     Performed at:  [facility],  [place], [employer]   Phone [phone]   TROPONIN - Abnormal; Notable for the following components:    Troponin 0.03 (*)     All other components within normal limits    Narrative:     Performed at:  [facility],  [place], [employer]   Phone [phone], [place]    Narrative:     Performed at:  [facility],  [employer]   Phone [phone]   URINE RT REFLEX TO CULTURE       All other labs werewithin normal range or not returned as of this dictation. EKG: All EKG's are interpreted by the Emergency Department Physician who either signs or Co-signs this chart in the absence of a cardiologist.  Please see their note for interpretation of EKG. RADIOLOGY:     Chest x-ray interpreted by radiologist for vascular congestion. Bibasilar pleural effusions and airspace disease greater on the right with progression of the interval most likely pulmonary edema. Interpretation per the Radiologist below, if available at the time of this note:    XR CHEST PORTABLE   Final Result   Vascular congestion. Bibasilar pleural effusions and airspace disease, greater on the right, with   progression in the interval, most likely pulmonary edema. No results found. PROCEDURES   Unless otherwise noted below, none     Procedures     CRITICAL CARE TIME   N/A    CONSULTS:  IP CONSULT TO HOSPITALIST      EMERGENCYDEPARTMENT COURSE and DIFFERENTIAL DIAGNOSIS/MDM:   Vitals:    Vitals:    08/10/21 2002 08/10/21 2032 08/10/21 2046 08/10/21 2101   BP: 132/69 133/67  124/67   Pulse: 88 86 87 90   Resp: 19 23 20 24   Temp:       TempSrc:       SpO2: 97% 98% 99% 96%   Weight:       Height:           Patient was given the following medications:  Medications   furosemide (LASIX) injection 40 mg (40 mg Intravenous Given 8/10/21 1850)       Patient was seen and evaluated by myself and Dr. Jay Garcia. Patient here for concerns for shortness of breath. Patient reports that she was seen and had a chest x-ray yesterday and was told to come to the ED today because she had fluid on her chest x-ray. Family reports that she has been having increased shortness of breath for the last week however it became worse yesterday. She denies any fever nausea vomiting diarrhea. Patient states that she does use Lasix as needed. She does not use oxygen at baseline.   On exam the patient is awake and alert patient did have increased work of breathing. She was hypoxic with an oxygen saturation in the upper 80s to low 90s. Lab values have been reviewed and interpreted. Patient had an elevated BNP at 20,636. She did have a slightly elevated troponin at 0.03 and repeat troponin is currently pending. Patient was given Lasix in the ED. [facility] however there were no beds available. Consult is currently being placed to other facilities for admission. Dr. Lisbeth Denis to follow-up on final disposition and transfer. The patient tolerated their visit well. I have evaluated this patient. My supervising physician was available for consultation. The patient and / or the family were informed of the results of any tests, a time was given to answer questions, a plan was proposed and they agreed with plan. FINAL IMPRESSION      1. Acute respiratory failure with hypoxemia (HCC)    2. Acute on chronic congestive heart failure, unspecified heart failure type ([facility]          DISPOSITION/PLAN   DISPOSITION        PATIENT REFERRED TO:  No follow-up provider specified.     DISCHARGE MEDICATIONS:  New Prescriptions    No medications on file       DISCONTINUED MEDICATIONS:  Discontinued Medications    No medications on file              (Please note that portions of this note were completed with a voice recognition program.  Efforts were made to edit the dictations but occasionally words are mis-transcribed.)    LORENZO Johnson CNP (electronically signed)         LORENZO Johnson CNP  08/10/21 2856

## 2021-08-10 NOTE — PROGRESS NOTES
CC: cough  HPI    Interval History:    Worked in for acute sick visit. One week increasing shortness of breath, worse with any exertion or talking. Cannot sleep flat at night. Much worse over last 24 hours. Got CXR that shows new pleural effusions and pulmonary edema. DANIELLE - was set back up with CPAP. PREVIOUS HX  63 yo with 2 month h/o severe waxing and waning cough, treated with avelox without improvement and then changed to Z pac, some improvement; then treated with prednisone and Z pac and then clearly better, then returned and retreated with cough medicine and prednisone. No cough prior to 3 months ago except with sinus drainage. Cough has slowly been improving since last visit here. Objective:   Physical Exam  Blood pressure 128/72, pulse 83, temperature 97.6 °F (36.4 °C), resp. rate 20, height 5' 8\" (1.727 m), weight 155 lb (70.3 kg), SpO2 92 %. 'on RA  Constitutional:  No acute distress. HENT:  Oropharynx is clear and moist.   Neck: No tracheal deviation present. Cardiovascular: Normal heart sounds. +++ lower extremity edema. Pulmonary/Chest: No wheezes. No rhonchi. Bibasilar rales. + decreased breath sounds. No accessory muscle usage or stridor. Musculoskeletal: No cyanosis. No clubbing. Skin: Skin is warm and dry. Psychiatric: Normal mood and affect. Neurologic: speech fluent, alert and oriented, strength symmetric      PFT Jan 2012  FEV1 2.2 L 77% nl ratio TLC 84% DLCO 18.29 88%  PFT 4-22-13  FEV1 2.26 L 70%  TLC 92% DLCO 19.08 69%  PFT Mar 2014  FEV1 2.08 L 65%  TLC 5.63 88% DLCO 20.05 73%  PFT June 2014 FEV1 2.12 L 69%  TLC 6.14 L 99% DLCO 18.31 68%  PFT April 2016 FEV1 2.24 L FVC 3.2 L TLC 6.36 l DLCO 19.16 72%    Pertussis antibodies are positive    CT CHEST 4/23/21  Pulmonary Arteries: Pulmonary arteries are adequately opacified for   evaluation.  No evidence of intraluminal filling defect to suggest pulmonary   embolism.  Main pulmonary artery is normal in caliber.     Mediastinum: No evidence of mediastinal lymphadenopathy.  The heart and   pericardium demonstrate no acute abnormality.  There is no acute abnormality   of the thoracic aorta.       Lungs/pleura: Diffuse emphysematous changes including centrilobular and   paraseptal.  Mild patchy opacities are noted in the bilateral lower lungs   which could indicate an inflammatory/post inflammatory process.  Small area   of consolidation in the left perihilar region which may be concerning for   underlying airspace disease process.  No pleural effusion.       Upper Abdomen: Limited images of the upper abdomen are unremarkable.       Soft Tissues/Bones: Chronic compression fractures with evidence of prior   vertebroplasty involving the T12 and L1 vertebral bodies.  Otherwise no   evidence of an acute process in the soft tissues or skeletal structures.           Impression   No evidence of pulmonary embolism.       Diffuse emphysematous changes including centrilobular and paraseptal. Mild   patchy opacities are noted in the bilateral lower lungs which could indicate   an inflammatory/post inflammatory process.  Small area of consolidation in   the left perihilar region which may be concerning for underlying airspace   disease process. CPAP titration 8/20/19 CPAP 7  PSG 7/17/19 AHI 8, but AHI 46 with REM    CABG 5/3/2021 Urgent coronary bypass grafting surgery x3 with single greater saphenous vein graft to the posterior ventricular branch of the right coronary artery, separate single greater saphenous vein graft to the second obtuse marginal branch of circumflex, pedicled left internal artery to the LAD.  Left atrial appendage obliteration      Assessment:      · Acute Respiratory Failure - she has increased work of breathing and can only speak in 2 to 2 word sentences in exam room  · Acute pleural effusion  · Acute pulmonary edema   · COPD/Chronic bronchitis/cough with cylindrical bronchiectasis  · Centrilobular pulmonary emphysema  · Mild DANIELLE/REM related sleep disordered breathing - recently restarted CPAP with benefit   · CAD s/p CABG 5/3/21  · Tracheomalacia    · Lower extremity edema  · Pulmonary Nodules have resolved  · Rheumatoid arthritis on Embrel and MTX and 4 mg prednisone  · Positive tobacco history, 20 pack year quit in 1991  · Diabetes      Plan:      · ED for evaluation for suspected acute heart failure, almost certainly will require admission for respiratory failure  · Continue azithromycin daily  · Continue Daliresp; off Singulair  -- Failed Singulair, Failed Dulera, Failed Spiriva, Failed Advair.     · I recommended regular use of CPAP.     · F/U will be with me 2-3 weeks after discharge

## 2021-08-11 ENCOUNTER — HOSPITAL ENCOUNTER (OUTPATIENT)
Dept: WOUND CARE | Age: 71
Discharge: HOME | End: 2021-08-11
Payer: MEDICARE

## 2021-08-11 ENCOUNTER — HOSPITAL ENCOUNTER (INPATIENT)
Age: 71
LOS: 6 days | Discharge: HOME HEALTH | DRG: 286 | End: 2021-08-17
Attending: INTERNAL MEDICINE | Admitting: INTERNAL MEDICINE
Payer: MEDICARE

## 2021-08-11 VITALS
WEIGHT: 155 LBS | SYSTOLIC BLOOD PRESSURE: 101 MMHG | TEMPERATURE: 98.3 F | BODY MASS INDEX: 22.19 KG/M2 | DIASTOLIC BLOOD PRESSURE: 50 MMHG | HEART RATE: 79 BPM | HEIGHT: 70 IN | RESPIRATION RATE: 16 BRPM | OXYGEN SATURATION: 96 %

## 2021-08-11 DIAGNOSIS — L89.154 PRESSURE ULCER OF COCCYGEAL REGION, STAGE 4 (HCC): ICD-10-CM

## 2021-08-11 DIAGNOSIS — T81.31XA SURGICAL WOUND DEHISCENCE, INITIAL ENCOUNTER: ICD-10-CM

## 2021-08-11 DIAGNOSIS — I50.9 CONGESTIVE HEART FAILURE OF UNKNOWN ETIOLOGY (HCC): Primary | ICD-10-CM

## 2021-08-11 LAB
A/G RATIO: 0.7 (ref 1.1–2.2)
ALBUMIN SERPL-MCNC: 3 G/DL (ref 3.4–5)
ALP BLD-CCNC: 136 U/L (ref 40–129)
ALT SERPL-CCNC: 8 U/L (ref 10–40)
ANION GAP SERPL CALCULATED.3IONS-SCNC: 10 MMOL/L (ref 3–16)
AST SERPL-CCNC: 9 U/L (ref 15–37)
BASOPHILS ABSOLUTE: 0 K/UL (ref 0–0.2)
BASOPHILS RELATIVE PERCENT: 1.1 %
BILIRUB SERPL-MCNC: 0.6 MG/DL (ref 0–1)
BUN BLDV-MCNC: 11 MG/DL (ref 7–20)
CALCIUM SERPL-MCNC: 9.1 MG/DL (ref 8.3–10.6)
CHLORIDE BLD-SCNC: 96 MMOL/L (ref 99–110)
CO2: 29 MMOL/L (ref 21–32)
CREAT SERPL-MCNC: 0.7 MG/DL (ref 0.6–1.2)
EKG ATRIAL RATE: 83 BPM
EKG DIAGNOSIS: NORMAL
EKG P AXIS: -2 DEGREES
EKG Q-T INTERVAL: 388 MS
EKG QRS DURATION: 92 MS
EKG QTC CALCULATION (BAZETT): 455 MS
EKG R AXIS: 17 DEGREES
EKG T AXIS: 79 DEGREES
EKG VENTRICULAR RATE: 83 BPM
EOSINOPHILS ABSOLUTE: 0.3 K/UL (ref 0–0.6)
EOSINOPHILS RELATIVE PERCENT: 6.7 %
ESTIMATED AVERAGE GLUCOSE: 159.9 MG/DL
GFR AFRICAN AMERICAN: >60
GFR NON-AFRICAN AMERICAN: >60
GLOBULIN: 4.2 G/DL
GLUCOSE BLD-MCNC: 168 MG/DL (ref 70–99)
GLUCOSE BLD-MCNC: 201 MG/DL (ref 70–99)
GLUCOSE BLD-MCNC: 233 MG/DL (ref 70–99)
GLUCOSE BLD-MCNC: 305 MG/DL (ref 70–99)
GLUCOSE BLD-MCNC: 329 MG/DL (ref 70–99)
GLUCOSE BLD-MCNC: 394 MG/DL (ref 70–99)
HBA1C MFR BLD: 7.2 %
HCT VFR BLD CALC: 31.6 % (ref 36–48)
HEMOGLOBIN: 10.3 G/DL (ref 12–16)
IRON SATURATION: 14 % (ref 15–50)
IRON: 34 UG/DL (ref 37–145)
LYMPHOCYTES ABSOLUTE: 1.1 K/UL (ref 1–5.1)
LYMPHOCYTES RELATIVE PERCENT: 29 %
MAGNESIUM: 1.5 MG/DL (ref 1.8–2.4)
MCH RBC QN AUTO: 27.1 PG (ref 26–34)
MCHC RBC AUTO-ENTMCNC: 32.6 G/DL (ref 31–36)
MCV RBC AUTO: 83.2 FL (ref 80–100)
MONOCYTES ABSOLUTE: 0.3 K/UL (ref 0–1.3)
MONOCYTES RELATIVE PERCENT: 7.4 %
NEUTROPHILS ABSOLUTE: 2.1 K/UL (ref 1.7–7.7)
NEUTROPHILS RELATIVE PERCENT: 55.8 %
PDW BLD-RTO: 16.7 % (ref 12.4–15.4)
PERFORMED ON: ABNORMAL
PHOSPHORUS: 3.2 MG/DL (ref 2.5–4.9)
PLATELET # BLD: 250 K/UL (ref 135–450)
PMV BLD AUTO: 7.7 FL (ref 5–10.5)
POTASSIUM SERPL-SCNC: 3.9 MMOL/L (ref 3.5–5.1)
RBC # BLD: 3.8 M/UL (ref 4–5.2)
SODIUM BLD-SCNC: 135 MMOL/L (ref 136–145)
TOTAL IRON BINDING CAPACITY: 236 UG/DL (ref 260–445)
TOTAL PROTEIN: 7.2 G/DL (ref 6.4–8.2)
TROPONIN: 0.02 NG/ML
TROPONIN: 0.03 NG/ML
WBC # BLD: 3.8 K/UL (ref 4–11)

## 2021-08-11 PROCEDURE — 83036 HEMOGLOBIN GLYCOSYLATED A1C: CPT

## 2021-08-11 PROCEDURE — 94761 N-INVAS EAR/PLS OXIMETRY MLT: CPT

## 2021-08-11 PROCEDURE — 83735 ASSAY OF MAGNESIUM: CPT

## 2021-08-11 PROCEDURE — 6370000000 HC RX 637 (ALT 250 FOR IP): Performed by: INTERNAL MEDICINE

## 2021-08-11 PROCEDURE — 80061 LIPID PANEL: CPT

## 2021-08-11 PROCEDURE — 2580000003 HC RX 258: Performed by: INTERNAL MEDICINE

## 2021-08-11 PROCEDURE — 99223 1ST HOSP IP/OBS HIGH 75: CPT | Performed by: INTERNAL MEDICINE

## 2021-08-11 PROCEDURE — 93010 ELECTROCARDIOGRAM REPORT: CPT | Performed by: INTERNAL MEDICINE

## 2021-08-11 PROCEDURE — 2060000000 HC ICU INTERMEDIATE R&B

## 2021-08-11 PROCEDURE — 84100 ASSAY OF PHOSPHORUS: CPT

## 2021-08-11 PROCEDURE — 97606 NEG PRS WND THER DME>50 SQCM: CPT

## 2021-08-11 PROCEDURE — 2700000000 HC OXYGEN THERAPY PER DAY

## 2021-08-11 PROCEDURE — 84484 ASSAY OF TROPONIN QUANT: CPT

## 2021-08-11 PROCEDURE — 6370000000 HC RX 637 (ALT 250 FOR IP): Performed by: PHYSICIAN ASSISTANT

## 2021-08-11 PROCEDURE — 83540 ASSAY OF IRON: CPT

## 2021-08-11 PROCEDURE — 6360000002 HC RX W HCPCS: Performed by: INTERNAL MEDICINE

## 2021-08-11 PROCEDURE — 94640 AIRWAY INHALATION TREATMENT: CPT

## 2021-08-11 PROCEDURE — 83550 IRON BINDING TEST: CPT

## 2021-08-11 PROCEDURE — 36415 COLL VENOUS BLD VENIPUNCTURE: CPT

## 2021-08-11 PROCEDURE — 6360000002 HC RX W HCPCS: Performed by: NURSE PRACTITIONER

## 2021-08-11 PROCEDURE — 80053 COMPREHEN METABOLIC PANEL: CPT

## 2021-08-11 PROCEDURE — 85025 COMPLETE CBC W/AUTO DIFF WBC: CPT

## 2021-08-11 RX ORDER — SODIUM CHLORIDE 0.9 % (FLUSH) 0.9 %
10 SYRINGE (ML) INJECTION PRN
Status: DISCONTINUED | OUTPATIENT
Start: 2021-08-11 | End: 2021-08-17 | Stop reason: HOSPADM

## 2021-08-11 RX ORDER — SODIUM CHLORIDE 9 MG/ML
25 INJECTION, SOLUTION INTRAVENOUS PRN
Status: DISCONTINUED | OUTPATIENT
Start: 2021-08-11 | End: 2021-08-17 | Stop reason: HOSPADM

## 2021-08-11 RX ORDER — ALBUTEROL SULFATE 90 UG/1
2 AEROSOL, METERED RESPIRATORY (INHALATION) EVERY 4 HOURS PRN
Status: DISCONTINUED | OUTPATIENT
Start: 2021-08-11 | End: 2021-08-17 | Stop reason: HOSPADM

## 2021-08-11 RX ORDER — SODIUM CHLORIDE 0.9 % (FLUSH) 0.9 %
5-40 SYRINGE (ML) INJECTION EVERY 12 HOURS SCHEDULED
Status: DISCONTINUED | OUTPATIENT
Start: 2021-08-11 | End: 2021-08-17 | Stop reason: HOSPADM

## 2021-08-11 RX ORDER — NICOTINE POLACRILEX 4 MG
15 LOZENGE BUCCAL PRN
Status: DISCONTINUED | OUTPATIENT
Start: 2021-08-11 | End: 2021-08-17 | Stop reason: HOSPADM

## 2021-08-11 RX ORDER — IPRATROPIUM BROMIDE AND ALBUTEROL SULFATE 2.5; .5 MG/3ML; MG/3ML
1 SOLUTION RESPIRATORY (INHALATION)
Status: DISCONTINUED | OUTPATIENT
Start: 2021-08-11 | End: 2021-08-17 | Stop reason: HOSPADM

## 2021-08-11 RX ORDER — ACETAMINOPHEN 650 MG/1
650 SUPPOSITORY RECTAL EVERY 6 HOURS PRN
Status: DISCONTINUED | OUTPATIENT
Start: 2021-08-11 | End: 2021-08-17 | Stop reason: HOSPADM

## 2021-08-11 RX ORDER — CETIRIZINE HYDROCHLORIDE 10 MG/1
10 TABLET ORAL DAILY
Status: DISCONTINUED | OUTPATIENT
Start: 2021-08-11 | End: 2021-08-17 | Stop reason: HOSPADM

## 2021-08-11 RX ORDER — ASPIRIN 81 MG/1
81 TABLET ORAL DAILY
Status: DISCONTINUED | OUTPATIENT
Start: 2021-08-11 | End: 2021-08-17 | Stop reason: HOSPADM

## 2021-08-11 RX ORDER — POLYETHYLENE GLYCOL 3350 17 G/17G
17 POWDER, FOR SOLUTION ORAL DAILY PRN
Status: DISCONTINUED | OUTPATIENT
Start: 2021-08-11 | End: 2021-08-17 | Stop reason: HOSPADM

## 2021-08-11 RX ORDER — INSULIN LISPRO 100 [IU]/ML
0-3 INJECTION, SOLUTION INTRAVENOUS; SUBCUTANEOUS NIGHTLY
Status: DISCONTINUED | OUTPATIENT
Start: 2021-08-11 | End: 2021-08-15

## 2021-08-11 RX ORDER — CYCLOBENZAPRINE HCL 10 MG
10 TABLET ORAL 2 TIMES DAILY PRN
Status: DISCONTINUED | OUTPATIENT
Start: 2021-08-11 | End: 2021-08-17 | Stop reason: HOSPADM

## 2021-08-11 RX ORDER — PREDNISONE 1 MG/1
4 TABLET ORAL DAILY
Status: DISCONTINUED | OUTPATIENT
Start: 2021-08-11 | End: 2021-08-17 | Stop reason: HOSPADM

## 2021-08-11 RX ORDER — OXYCODONE AND ACETAMINOPHEN 10; 325 MG/1; MG/1
1 TABLET ORAL DAILY PRN
Status: DISCONTINUED | OUTPATIENT
Start: 2021-08-11 | End: 2021-08-17 | Stop reason: HOSPADM

## 2021-08-11 RX ORDER — FUROSEMIDE 10 MG/ML
40 INJECTION INTRAMUSCULAR; INTRAVENOUS 2 TIMES DAILY
Status: DISCONTINUED | OUTPATIENT
Start: 2021-08-11 | End: 2021-08-13

## 2021-08-11 RX ORDER — ACETAMINOPHEN 325 MG/1
650 TABLET ORAL EVERY 6 HOURS PRN
Status: DISCONTINUED | OUTPATIENT
Start: 2021-08-11 | End: 2021-08-17 | Stop reason: HOSPADM

## 2021-08-11 RX ORDER — ONDANSETRON 2 MG/ML
4 INJECTION INTRAMUSCULAR; INTRAVENOUS EVERY 6 HOURS PRN
Status: DISCONTINUED | OUTPATIENT
Start: 2021-08-11 | End: 2021-08-17 | Stop reason: HOSPADM

## 2021-08-11 RX ORDER — INSULIN LISPRO 100 [IU]/ML
0-6 INJECTION, SOLUTION INTRAVENOUS; SUBCUTANEOUS
Status: DISCONTINUED | OUTPATIENT
Start: 2021-08-11 | End: 2021-08-15

## 2021-08-11 RX ORDER — MAGNESIUM SULFATE IN WATER 40 MG/ML
2000 INJECTION, SOLUTION INTRAVENOUS ONCE
Status: COMPLETED | OUTPATIENT
Start: 2021-08-11 | End: 2021-08-11

## 2021-08-11 RX ORDER — ONDANSETRON 4 MG/1
4 TABLET, ORALLY DISINTEGRATING ORAL EVERY 8 HOURS PRN
Status: DISCONTINUED | OUTPATIENT
Start: 2021-08-11 | End: 2021-08-17 | Stop reason: HOSPADM

## 2021-08-11 RX ORDER — PANTOPRAZOLE SODIUM 40 MG/1
40 TABLET, DELAYED RELEASE ORAL
Status: DISCONTINUED | OUTPATIENT
Start: 2021-08-11 | End: 2021-08-17 | Stop reason: HOSPADM

## 2021-08-11 RX ORDER — MORPHINE SULFATE 15 MG/1
15 TABLET, FILM COATED, EXTENDED RELEASE ORAL EVERY 12 HOURS SCHEDULED
Status: DISCONTINUED | OUTPATIENT
Start: 2021-08-11 | End: 2021-08-17 | Stop reason: HOSPADM

## 2021-08-11 RX ORDER — DULOXETIN HYDROCHLORIDE 60 MG/1
60 CAPSULE, DELAYED RELEASE ORAL DAILY
Status: DISCONTINUED | OUTPATIENT
Start: 2021-08-11 | End: 2021-08-17 | Stop reason: HOSPADM

## 2021-08-11 RX ORDER — LATANOPROST 50 UG/ML
1 SOLUTION/ DROPS OPHTHALMIC NIGHTLY
Status: DISCONTINUED | OUTPATIENT
Start: 2021-08-11 | End: 2021-08-17 | Stop reason: HOSPADM

## 2021-08-11 RX ORDER — FLUTICASONE PROPIONATE 50 MCG
2 SPRAY, SUSPENSION (ML) NASAL DAILY
Status: DISCONTINUED | OUTPATIENT
Start: 2021-08-11 | End: 2021-08-17 | Stop reason: HOSPADM

## 2021-08-11 RX ORDER — DEXTROSE MONOHYDRATE 50 MG/ML
100 INJECTION, SOLUTION INTRAVENOUS PRN
Status: DISCONTINUED | OUTPATIENT
Start: 2021-08-11 | End: 2021-08-17 | Stop reason: HOSPADM

## 2021-08-11 RX ORDER — ATORVASTATIN CALCIUM 40 MG/1
40 TABLET, FILM COATED ORAL NIGHTLY
Status: DISCONTINUED | OUTPATIENT
Start: 2021-08-11 | End: 2021-08-17 | Stop reason: HOSPADM

## 2021-08-11 RX ORDER — CLOPIDOGREL BISULFATE 75 MG/1
75 TABLET ORAL DAILY
Status: DISCONTINUED | OUTPATIENT
Start: 2021-08-11 | End: 2021-08-17 | Stop reason: HOSPADM

## 2021-08-11 RX ORDER — CALCIUM CARBONATE 500(1250)
500 TABLET ORAL DAILY
Status: DISCONTINUED | OUTPATIENT
Start: 2021-08-11 | End: 2021-08-17 | Stop reason: HOSPADM

## 2021-08-11 RX ORDER — DEXTROSE MONOHYDRATE 25 G/50ML
12.5 INJECTION, SOLUTION INTRAVENOUS PRN
Status: DISCONTINUED | OUTPATIENT
Start: 2021-08-11 | End: 2021-08-17 | Stop reason: HOSPADM

## 2021-08-11 RX ORDER — SPIRONOLACTONE 25 MG/1
12.5 TABLET ORAL DAILY
Status: DISCONTINUED | OUTPATIENT
Start: 2021-08-12 | End: 2021-08-13

## 2021-08-11 RX ORDER — DOCUSATE SODIUM 100 MG/1
100 CAPSULE, LIQUID FILLED ORAL 2 TIMES DAILY PRN
Status: DISCONTINUED | OUTPATIENT
Start: 2021-08-11 | End: 2021-08-15

## 2021-08-11 RX ADMIN — DULOXETINE HYDROCHLORIDE 60 MG: 60 CAPSULE, DELAYED RELEASE ORAL at 09:12

## 2021-08-11 RX ADMIN — ASPIRIN 81 MG: 81 TABLET, COATED ORAL at 09:12

## 2021-08-11 RX ADMIN — CLOPIDOGREL BISULFATE 75 MG: 75 TABLET ORAL at 09:12

## 2021-08-11 RX ADMIN — ENOXAPARIN SODIUM 40 MG: 40 INJECTION SUBCUTANEOUS at 09:12

## 2021-08-11 RX ADMIN — Medication 10 ML: at 09:13

## 2021-08-11 RX ADMIN — PREDNISONE 4 MG: 1 TABLET ORAL at 09:12

## 2021-08-11 RX ADMIN — INSULIN LISPRO 4 UNITS: 100 INJECTION, SOLUTION INTRAVENOUS; SUBCUTANEOUS at 11:52

## 2021-08-11 RX ADMIN — PANTOPRAZOLE SODIUM 40 MG: 40 TABLET, DELAYED RELEASE ORAL at 09:20

## 2021-08-11 RX ADMIN — FUROSEMIDE 40 MG: 10 INJECTION, SOLUTION INTRAMUSCULAR; INTRAVENOUS at 09:12

## 2021-08-11 RX ADMIN — IPRATROPIUM BROMIDE AND ALBUTEROL SULFATE 1 AMPULE: .5; 3 SOLUTION RESPIRATORY (INHALATION) at 07:57

## 2021-08-11 RX ADMIN — Medication 10 ML: at 19:30

## 2021-08-11 RX ADMIN — INSULIN LISPRO 4 UNITS: 100 INJECTION, SOLUTION INTRAVENOUS; SUBCUTANEOUS at 18:23

## 2021-08-11 RX ADMIN — CYCLOBENZAPRINE 10 MG: 10 TABLET, FILM COATED ORAL at 21:06

## 2021-08-11 RX ADMIN — INSULIN LISPRO 3 UNITS: 100 INJECTION, SOLUTION INTRAVENOUS; SUBCUTANEOUS at 20:21

## 2021-08-11 RX ADMIN — METOPROLOL TARTRATE 12.5 MG: 25 TABLET, FILM COATED ORAL at 20:21

## 2021-08-11 RX ADMIN — FLUTICASONE PROPIONATE 2 SPRAY: 50 SPRAY, METERED NASAL at 11:51

## 2021-08-11 RX ADMIN — IPRATROPIUM BROMIDE AND ALBUTEROL SULFATE 1 AMPULE: .5; 3 SOLUTION RESPIRATORY (INHALATION) at 15:33

## 2021-08-11 RX ADMIN — LATANOPROST 1 DROP: 50 SOLUTION OPHTHALMIC at 20:21

## 2021-08-11 RX ADMIN — CHOLECALCIFEROL TAB 125 MCG (5000 UNIT) 5000 UNITS: 125 TAB at 09:12

## 2021-08-11 RX ADMIN — CETIRIZINE HYDROCHLORIDE 10 MG: 10 TABLET, FILM COATED ORAL at 09:12

## 2021-08-11 RX ADMIN — METOPROLOL TARTRATE 12.5 MG: 25 TABLET, FILM COATED ORAL at 09:11

## 2021-08-11 RX ADMIN — INSULIN GLARGINE 15 UNITS: 100 INJECTION, SOLUTION SUBCUTANEOUS at 21:06

## 2021-08-11 RX ADMIN — FUROSEMIDE 40 MG: 10 INJECTION, SOLUTION INTRAMUSCULAR; INTRAVENOUS at 19:27

## 2021-08-11 RX ADMIN — ATORVASTATIN CALCIUM 40 MG: 40 TABLET, FILM COATED ORAL at 20:21

## 2021-08-11 RX ADMIN — MAGNESIUM SULFATE HEPTAHYDRATE 2000 MG: 40 INJECTION, SOLUTION INTRAVENOUS at 09:33

## 2021-08-11 RX ADMIN — IPRATROPIUM BROMIDE AND ALBUTEROL SULFATE 1 AMPULE: .5; 3 SOLUTION RESPIRATORY (INHALATION) at 20:56

## 2021-08-11 RX ADMIN — MORPHINE SULFATE 15 MG: 15 TABLET, FILM COATED, EXTENDED RELEASE ORAL at 21:05

## 2021-08-11 RX ADMIN — ROFLUMILAST 500 MCG: 500 TABLET ORAL at 11:51

## 2021-08-11 RX ADMIN — Medication 500 MG: at 09:12

## 2021-08-11 ASSESSMENT — PAIN DESCRIPTION - PAIN TYPE: TYPE: CHRONIC PAIN

## 2021-08-11 ASSESSMENT — PAIN SCALES - GENERAL: PAINLEVEL_OUTOF10: 8

## 2021-08-11 ASSESSMENT — PAIN DESCRIPTION - LOCATION: LOCATION: BACK

## 2021-08-11 NOTE — H&P
Hospital Medicine History and Physical    8/11/2021    Date of Admission: 8/11/2021    Date of Service: Pt seen/examined on 8/11/2021 and admitted to inpatient. Assessment and plan:  1. Acute on chronic diastolic CHF. Continue IV Lasix. Monitor volume status closely. Maintain on fluid restriction to 2 L daily. Patient had recent echocardiogram on 4/24/2021, reveals normal ejection fraction of 55%. 2. Acute respiratory failure with hypoxia. Secondary to #1. Treat underlying CHF exacerbation. Continue pulse oximeter, wean oxygen as tolerated. 3. Chest discomfort, nonzero troponin of 0.03. Patient had reported some chest discomfort in the emergency room, although she denies chest pain at the time of my evaluation. Continue antiplatelet therapy, statin. Trend troponin. Monitor on telemetry. Cardiology consult to assist with the evaluation. 4. Accelerated junctional rhythm noted on EKG. Continue telemetry monitoring. Monitor electrolytes closely, replace as needed. Cardiology consult to assist with evaluation. 5. Uncontrolled diabetes type 2 with hyperglycemia. Continue scheduled Lantus, sliding scale insulin. Monitor Accu-Chek closely and adjust insulin dose as needed. 6. Other comorbidities: history of CAD status post CABG, obstructive sleep apnea, COPD, rheumatoid arthritis, chronic diastolic CHF, uncontrolled diabetes type 2 with hyperglycemia (most recent hemoglobin A1c of 8.1), chronic nonzero troponin. Activities: Up with assist  Prophylaxis: Subcutaneous Lovenox  Code status: Code    ==========================================================  Chief complaint:  Shortness of breath    History of Presenting Illness:   This is a pleasant 71-year-old female with history of CAD status post CABG, obstructive sleep apnea, COPD, rheumatoid arthritis, chronic diastolic CHF, uncontrolled diabetes type 2 with hyperglycemia (most recent hemoglobin A1c of 8.1), chronic nonzero troponin, who presents to the emergency room with complaints of shortness of breath that has been ongoing for about a week and a half. She describes exertional dyspnea, orthopnea, increased bilateral lower extremity swelling. She denies chest pain. No cough or fever. On arrival to the emergency room, patient was hypoxic with oxygen saturation of 82% on room air, requiring 4 L/min supplemental oxygen to maintain saturations above 90%.    Chest x-ray reveals pulmonary vascular congestion and bibasilar pleural effusion likely secondary to pulmonary edema. proBNP elevated at 20,636. Troponin 0.03. EKG reveals accelerated junctional rhythm with septal Q waves and no acute ST changes. Past Medical History:      Diagnosis Date    Atherosclerosis of native artery of right lower extremity with rest pain ([address] 07/25/2017    Back pain     Branch retinal vein occlusion 07/20/2012    Bronchiectasis with acute exacerbation (HCC)     Cellulitis and abscess of left leg 7/11/2021    Cellulitis of left lower extremity 7/11/2021    S/P vein harvest 05/2021 for CABG    Closed compression fracture of thoracic vertebra ([address] 01/15/2020    Closed fracture of facial bone with routine healing 11/21/2016    Closed jaw fracture ([address] 01/15/2020    Community acquired pneumonia of left lower lobe of lung     Compression fracture of L1 lumbar vertebra ([address] 01/15/2020    COPD (chronic obstructive pulmonary disease) (HCC)     Fracture of tibial plateau, closed, left, initial encounter 12/05/2017    Minimally displaced zone I fracture of sacrum (HCC) 09/02/2020    MRSA (methicillin resistant staph aureus) culture positive 07/2021    MRSA (methicillin resistant Staphylococcus aureus) 07/13/2021    wound    Mucus plugging of bronchi     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  Bone density test last year showed severe osteoporosis. Recently, two broken vertebrae (L1, L2) due to coughing. Diagnosed with tracheomalacia and stated she must cough very hard to clear phlegm. Was coughing due to upper respiratory infections which have been treated. Hx of laminectomy and recent kyphoplasty.    Refractured her jawbone which was previously repaired wi    Post herpetic neuralgia     Proximal humerus fracture 10/01/2019    Rheumatoid arthritis ([address]     Shingles 05/2020    Sleep apnea     Status post incision and drainage 07/2021    Left Leg    Temporal arteritis ([address] 07/10/2013    Tobacco use 10/19/2017    Tracheomalacia     Vitreous hemorrhage, right eye ([address] 02/21/2020       Past Surgical History:      Procedure Laterality Date    ARTERY BIOPSY Right 03/01/2021    at [address]  05/30/2013    left temporal artery biopsy    BACK SURGERY  08/2020    BRONCHOSCOPY      BRONCHOSCOPY N/A 06/12/2019    BRONCHOSCOPY ALVEOLAR LAVAGE performed by Georgie Ahumada MD at [address]  05/03/2021    CATARACT REMOVAL      CORONARY ARTERY BYPASS GRAFT N/A 05/03/2021    CORONARY ARTERY BYPASS X3 WITH LEFT ATRIAL APPENDAGE CLIP, 5 LEVEL BILATERAL INTERCOSTAL NERVE BLOCK, STERNAL PLATING performed by Yulisa Leung MD at [facility]      IR MIDLINE CATH  7/14/2021    IR MIDLINE CATH 7/14/2021 [facility] SPECIAL PROCEDURES    KNEE ARTHROSCOPY      left    KYPHOSIS SURGERY      LAMINECTOMY      LEG SURGERY Left 7/13/2021    INCISION AND DEBRIDEMENT LEFT LOWER LEG WOUND WITH POSSIBLE WOUND VAC PLACEMENT performed by Carrie Harris MD at [address] [place] [address]  02/2020    MEDIASTINOSCOPY N/A 05/05/2021    MEDIASTINAL EXPLORATION AND EVACUATION OF HEMATOMA performed by Yulisa Lenug MD at [facility]  01/08/2021    sacral wound debridement    PRESSURE ULCER DEBRIDEMENT N/A 01/08/2021    SACRAL WOUND DEBRIDEMENT performed by Trenton Booth MD at [facility] SEPTOPLASTY  05/07/2013    FESS with balloon    SPINAL FUSION      TUBAL LIGATION      UPPER GASTROINTESTINAL ENDOSCOPY  04/08/2014    Dilitation       Medications (prior to admission):  Prior to Admission medications    Medication Sig Start Date End Date Taking? Authorizing Provider   DULoxetine (CYMBALTA) 60 MG extended release capsule Take 60 mg by mouth daily    Historical Provider, MD   DALIRESP 500 MCG tablet TAKE 1 TABLET BY MOUTH DAILY 6/23/21   Rober Johnson MD   clopidogrel (PLAVIX) 75 MG tablet Take 1 tablet by mouth daily 6/10/21   Kimmie Phillips MD   metoprolol tartrate (LOPRESSOR) 25 MG tablet Take 0.5 tablets by mouth 2 times daily Hold this medication for pulse <37 or systolic BP <995 8/59/27   Kimmie Phillips MD   predniSONE (DELTASONE) 1 MG tablet Take 4 mg by mouth daily    Historical Provider, MD   insulin glargine (LANTUS) 100 UNIT/ML injection vial Inject 15 Units into the skin nightly 5/10/21   LORENZO Bonilla - VERNON   oxyCODONE-acetaminophen (PERCOCET)  MG per tablet Take 1 tablet by mouth daily. Historical Provider, MD   docusate sodium (COLACE) 100 MG capsule Take 100 mg by mouth 2 times daily as needed for Constipation    Historical Provider, MD   gabapentin (NEURONTIN) 300 MG capsule Take 300 mg by mouth 2 times daily. Historical Provider, MD   latanoprost (XALATAN) 0.005 % ophthalmic solution Place 1 drop into both eyes nightly    Historical Provider, MD   Teriparatide, Recombinant, (FORTEO) 600 MCG/2.4ML SOPN injection Inject 20 mcg into the skin daily    Historical Provider, MD   NARCAN 4 MG/0.1ML LIQD nasal spray as needed  10/20/20   Historical Provider, MD   morphine (MS CONTIN) 15 MG extended release tablet 15 mg 2 times daily.   10/16/20   Historical Provider, MD   ipratropium (ATROVENT) 0.06 % nasal spray USE 2 SPRAYS BY Emphysema Neg Hx     Heart Failure Neg Hx        Review of Systems:  Pertinent positives are listed in HPI. At least 10-point ROS reviewed and were negative. Vitals and physical examination:  /76   Pulse 75   Temp 98 °F (36.7 °C) (Oral)   Resp 22   Ht 5' 10\" (1.778 m)   Wt 150 lb 8 oz (68.3 kg)   SpO2 100%   BMI 21.59 kg/m²     Gen/overall appearance: Not in acute distress. Alert. Oriented x3. Head: Normocephalic, atraumatic  Eyes: EOMI, good acuity  ENT: Oral mucosa moist  Neck: No JVD, thyromegaly  CVS: Nml S1S2, no MRG, RRR  Pulm: Dependent bibasilar crackles. No wheezes. Gastrointestinal: Soft, NT/ND, +BS  Musculoskeletal: Bilateral lower extremity edema. Warm  Neuro: No focal deficit. Moves extremity spontaneously. Psychiatry: Appropriate affect. Not agitated. Skin: Warm, dry with normal turgor. No rash  Capillary refill: Brisk,< 3 seconds   Peripheral Pulses: +2 palpable, equal bilaterally       Labs/imaging/EKG:  CBC:   Recent Labs     08/10/21  1735   WBC 5.5   HGB 9.9*        BMP:    Recent Labs     08/10/21  1735   *   K 4.1   CL 97*   CO2 24   BUN 12   CREATININE 0.7   GLUCOSE 235*     Hepatic:   Recent Labs     08/10/21  1735   AST 11*   ALT 9*   BILITOT 0.6   ALKPHOS 154*       XR CHEST (2 VW)    Result Date: 8/9/2021  EXAMINATION: TWO XRAY VIEWS OF THE CHEST 8/9/2021 4:40 pm COMPARISON: July 11, 2021. HISTORY: ORDERING SYSTEM PROVIDED HISTORY: SOB (shortness of breath) TECHNOLOGIST PROVIDED HISTORY: Reason for exam:->see dx Reason for Exam: SOB Acuity: Acute Type of Exam: Initial FINDINGS: The cardiomediastinal silhouette is moderately enlarged. Post sternotomy changes are present. Moderate vascular congestion is present along with moderate pulmonary edema pattern. There are small to moderate bilateral pleural effusions which have developed. No pneumothorax. Findings are most suggestive of congestive heart failure.      XR CHEST PORTABLE    Result Date: 8/10/2021  EXAMINATION: ONE XRAY VIEW OF THE CHEST 8/10/2021 7:35 pm COMPARISON: 08/09/2021 HISTORY: ORDERING SYSTEM PROVIDED HISTORY: SOB TECHNOLOGIST PROVIDED HISTORY: Reason for exam:->SOB Reason for Exam: Pt c/o having SOB Acuity: Acute Type of Exam: Initial FINDINGS: Sternotomy wires and left atrial appendage clip are present. The cardiac silhouette remains prominent. Pulmonary vessels are congested. There are bilateral pleural effusions, small to moderate size, increased on the right in the interval.  There are patchy opacities in the right mid and both lower lungs. There is no pneumothorax. Old right humeral neck fracture. Vascular congestion. Bibasilar pleural effusions and airspace disease, greater on the right, with progression in the interval, most likely pulmonary edema. IR MIDLINE CATH    Result Date: 7/14/2021  Radiology exam is complete. No Radiologist dictation. Please follow up with ordering provider. EKG:  EKG reveals accelerated junctional rhythm with septal Q waves and no acute ST changes. I reviewed EKG. Discussed with ER provider.       Thank you Gil Barajas MD for the opportunity to be involved in this patient's care.    -----------------------------  Monica Sarabia MD  [facility] Hospitalist

## 2021-08-11 NOTE — CONSULTS
[facility] [place]  Advanced CHF/Pulmonary Hypertension   Cardiac Evaluation      Christiane Magallon  YOB: 1950    Requesting PHysician  Manda Condon CNP      Chief complaint:  Shortness of breath  History of Present Illness:  Tiffanie Curtis is a 73 yo female  Who came to the ED for shortness of breath. She has had increasing shortness of breath over the last few weeks, but it became much more severe. She does not use oxygen at home. She follows with pulmonary. She was seen by pulmonary and sent to the ED. She only uses lasix as needed. She is on prednisone 4 mg for her arthritis daily. She was also having chest discomfort     She has a istory of CAD s/p CABG, obstructive sleep apnea, COPD, rheumatoid arthritis, chronic diastolic HF, uncontrolled diabetes type 2 with hyperglycemia (most recent A1c was 8.1). She admits to exertional dyspnea, orthopnea, increased bilateral lower extremity edema. No cough or fever. In the ED, she was hypoxic with oxygen saturation of 82% on room air, requiring 4 liters of oxygen to maintain sats above 90%. Chest Xray shows pulmonary vascular congestion and bibasilar pleural effusion. BNP elevated at 20K. Troponin 0.03, neither increasing or decreasing. EKG did not show acute ST changes. She has a history of non-STEMI with multivessel CAD. He had CABG 5/3/21 at [facility]. She had Urgent CABG x3 with single GSV graft to the posterior ventricular branch of the RCA, separate single GSV graft to the second OM branch of Cx, pedicled LIMA to the LAD. BIMAL obliteration with 40 mm AtriCure left atrial clip. Cardiopulmonary bypass. Endoscopic vein harvest of the left GSV. ABHISHEK. Epiaortic ultrasound. Doppler verification of grafts. Bilateral five-level intercostal nerve block with Exparel. Platelet gel application. Sternal plating.   5/5/2021 Mediastinal exploration and evacuation of hematoma.  last week she had surgery on her leg where the vein was harvested. She is transferred from [facility] to [facility] due to lack of beds at [facility]. We are consulted for management of HF. EF prior to CABG surgery was normal.    Labs:  Sodium 135  BUN/Cre 11/0.7  Troponin 0.03, 0.02  BNP 34060 (was 95302 on 7/11/21)  H/H 10.3/31.6     Echo:  4/24/21:   Normal left ventricle systolic function with an estimated ejection fraction   of 55%. No regional wall motion abnormalities are seen. Normal left ventricular diastolic filling pressure. Mild eccentric aortic regurgitation. Mild mitral and tricuspid regurgitation. Systolic pulmonary artery pressure (SPAP) is normal and estimated at 27 mmHg   (right atrial pressure 3 mmHg).     Meds prior to admission:  plavix 75 qd  Metoprolol 12.5 bid  Prednisone 4 mg qd  Ms contin 15 bid  lipitor  aspiri 81 qd      Allergies   Allergen Reactions    Atenolol      Cough       Current Facility-Administered Medications   Medication Dose Route Frequency Provider Last Rate Last Admin    albuterol sulfate  (90 Base) MCG/ACT inhaler 2 puff  2 puff Inhalation Q4H PRN Kayla Hernández MD        ipratropium-albuterol (DUONEB) nebulizer solution 1 ampule  1 ampule Inhalation Q4H WA Kayla Hernández MD   1 ampule at 08/11/21 1533    aspirin EC tablet 81 mg  81 mg Oral Daily Kayla Hernández MD   81 mg at 08/11/21 0912    atorvastatin (LIPITOR) tablet 40 mg  40 mg Oral Nightly Kayla Hernández MD        calcium elemental (OSCAL) tablet 500 mg  500 mg Oral Daily Kayla Hernández MD   500 mg at 08/11/21 0912    cetirizine (ZYRTEC) tablet 10 mg  10 mg Oral Daily Kayla Hernández MD   10 mg at 08/11/21 0912    clopidogrel (PLAVIX) tablet 75 mg  75 mg Oral Daily Kayla Hernández MD   75 mg at 08/11/21 0912    cyclobenzaprine (FLEXERIL) tablet 10 mg  10 mg Oral BID PRN Kayla Hernández MD        Roflumilast (DALIRESP) tablet 500 mcg  500 mcg Oral Daily Kayla Hernández MD   500 mcg at 08/11/21 1151    docusate sodium (COLACE) capsule 100 mg  100 mg Oral BID PRN Elizabeth Devlin MD        DULoxetine (CYMBALTA) extended release capsule 60 mg  60 mg Oral Daily Elizabeth Devlin MD   60 mg at 08/11/21 0912    fluticasone (FLONASE) 50 MCG/ACT nasal spray 2 spray  2 spray Each Nostril Daily Elizabeth Devlin MD   2 spray at 08/11/21 1151    insulin glargine (LANTUS;BASAGLAR) injection pen 15 Units  15 Units Subcutaneous Nightly Devon Guy MD        latanoprost (XALATAN) 0.005 % ophthalmic solution 1 drop  1 drop Both Eyes Nightly Elizabeth Devlin MD        metoprolol tartrate (LOPRESSOR) tablet 12.5 mg  12.5 mg Oral BID Elizabeth Devlin MD   12.5 mg at 08/11/21 0911    pantoprazole (PROTONIX) tablet 40 mg  40 mg Oral QAM AC Elizabeth Devlin MD   40 mg at 08/11/21 0920    predniSONE (DELTASONE) tablet 4 mg  4 mg Oral Daily Elizabeth Devlin MD   4 mg at 08/11/21 0912    vitamin D3 (CHOLECALCIFEROL) tablet 5,000 Units  5,000 Units Oral Daily Elizabeth Devlin MD   5,000 Units at 08/11/21 0912    insulin lispro (1 Unit Dial) 0-6 Units  0-6 Units Subcutaneous TID WC Elizabeth Devlin MD   4 Units at 08/11/21 1152    insulin lispro (1 Unit Dial) 0-3 Units  0-3 Units Subcutaneous Nightly Devon Bee MD        glucose (GLUTOSE) 40 % oral gel 15 g  15 g Oral PRN Elizabeth Devlin MD        dextrose 50 % IV solution  12.5 g Intravenous PRN Elizabeth Devlin MD        glucagon (rDNA) injection 1 mg  1 mg Intramuscular PRN Elizabeth Devlin MD        dextrose 5 % solution  100 mL/hr Intravenous PRN Elizabeth Devlin MD        sodium chloride flush 0.9 % injection 5-40 mL  5-40 mL Intravenous 2 times per day Elizabeth Devlin MD   10 mL at 08/11/21 0913    sodium chloride flush 0.9 % injection 10 mL  10 mL Intravenous PRN Elizabeth Devlin MD        0.9 % sodium chloride infusion  25 mL Intravenous PRN Elizabeth Devlin MD        ondansetron (ZOFRAN-ODT) disintegrating tablet 4 mg  4 mg Oral Q8H PRN Elizabeth Devlin MD Or    ondansetron (ZOFRAN) injection 4 mg  4 mg Intravenous Q6H PRN Seven Collins MD        polyethylene glycol (GLYCOLAX) packet 17 g  17 g Oral Daily PRN Seven Collins MD        acetaminophen (TYLENOL) tablet 650 mg  650 mg Oral Q6H PRN Seven Collins MD        Or    acetaminophen (TYLENOL) suppository 650 mg  650 mg Rectal Q6H PRN Seven Collins MD        enoxaparin (LOVENOX) injection 40 mg  40 mg Subcutaneous Daily Seven Collins MD   40 mg at 08/11/21 0912    furosemide (LASIX) injection 40 mg  40 mg Intravenous BID Seven Collins MD   40 mg at 08/11/21 5824       Past Medical History:   Diagnosis Date    Atherosclerosis of native artery of right lower extremity with rest pain ([address] 07/25/2017    Back pain     Branch retinal vein occlusion 07/20/2012    Bronchiectasis with acute exacerbation (HCC)     Cellulitis and abscess of left leg 7/11/2021    Cellulitis of left lower extremity 7/11/2021    S/P vein harvest 05/2021 for CABG    Closed compression fracture of thoracic vertebra ([address] 01/15/2020    Closed fracture of facial bone with routine healing 11/21/2016    Closed jaw fracture ([address] 01/15/2020    Community acquired pneumonia of left lower lobe of lung     Compression fracture of L1 lumbar vertebra ([address] 01/15/2020    COPD (chronic obstructive pulmonary disease) (HCC)     Fracture of tibial plateau, closed, left, initial encounter 12/05/2017    Minimally displaced zone I fracture of sacrum ([address] 09/02/2020    MRSA (methicillin resistant staph aureus) culture positive 07/2021    MRSA (methicillin resistant Staphylococcus aureus) 07/13/2021    wound    Mucus plugging of bronchi     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 Bone density test last year showed severe osteoporosis.   Recently, two broken vertebrae (L1, L2) due to coughing. Diagnosed with tracheomalacia and stated she must cough very hard to clear phlegm. Was coughing due to upper respiratory infections which have been treated. Hx of laminectomy and recent kyphoplasty.    Refractured her jawbone which was previously repaired wi    Post herpetic neuralgia     Proximal humerus fracture 10/01/2019    Rheumatoid arthritis ([address]     Shingles 05/2020    Sleep apnea     Status post incision and drainage 07/2021    Left Leg    Temporal arteritis ([address] 07/10/2013    Tobacco use 10/19/2017    Tracheomalacia     Vitreous hemorrhage, right eye ([address] 02/21/2020     Past Surgical History:   Procedure Laterality Date    ARTERY BIOPSY Right 03/01/2021    at [address]  05/30/2013    left temporal artery biopsy    BACK SURGERY  08/2020    BRONCHOSCOPY      BRONCHOSCOPY N/A 06/12/2019    BRONCHOSCOPY ALVEOLAR LAVAGE performed by Rolando Pereira MD at [address]  05/03/2021    CATARACT REMOVAL      CORONARY ARTERY BYPASS GRAFT N/A 05/03/2021    CORONARY ARTERY BYPASS X3 WITH LEFT ATRIAL APPENDAGE CLIP, 5 LEVEL BILATERAL INTERCOSTAL NERVE BLOCK, STERNAL PLATING performed by Vivienne Nevarez MD at [facility]      IR MIDLINE CATH  7/14/2021    IR MIDLINE CATH 7/14/2021 [facility] SPECIAL PROCEDURES    KNEE ARTHROSCOPY      left    KYPHOSIS SURGERY      LAMINECTOMY      LEG SURGERY Left 7/13/2021    INCISION AND DEBRIDEMENT LEFT LOWER LEG WOUND WITH POSSIBLE WOUND VAC PLACEMENT performed by Carlos Belle MD at [facility]  02/2020    MEDIASTINOSCOPY N/A 05/05/2021    MEDIASTINAL EXPLORATION AND EVACUATION OF HEMATOMA [facility] [address] [facility]  01/08[facility]2021    sacral wound debridement    PRESSURE ULCER DEBRIDEMENT N/A 01/08/2021    SACRAL WOUND DEBRIDEMENT performed by Sam Sifuentes MD at [address] SEPTOPLASTY  2013    FESS with balloon    SPINAL FUSION      TUBAL LIGATION      UPPER GASTROINTESTINAL ENDOSCOPY  2014    Dilitation     Family History   Problem Relation Age of Onset    Diabetes Mother     Hypertension Mother     Asthma Other     Heart Disease Brother     Diabetes Brother     Diabetes Sister     Heart Disease Brother     Cancer Neg Hx     Emphysema Neg Hx     Heart Failure Neg Hx      Social History     Socioeconomic History    Marital status:      Spouse name: Not on file    Number of children: Not on file    Years of education: Not on file    Highest education level: Not on file   Occupational History    Not on file   Tobacco Use    Smoking status: Former Smoker     Packs/day: 1.00     Years: 20.00     Pack years: 20.00     Types: Cigarettes     Quit date: 1991     Years since quittin.3    Smokeless tobacco: Never Used   Vaping Use    Vaping Use: Never used   Substance and Sexual Activity    Alcohol use: Not Currently     Alcohol/week: 0.0 standard drinks     Comment: rarely    Drug use: No    Sexual activity: Not on file   Other Topics Concern    Not on file   Social History Narrative    Not on file     Social Determinants of Health     Financial Resource Strain:     Difficulty of Paying Living Expenses:    Food Insecurity:     Worried About Running Out of Food in the Last Year:     [address] in the Last Year:    Transportation Needs:     Lack of Transportation (Medical):      Lack of Transportation (Non-Medical):    Physical Activity:     Days of Exercise per Week:     Minutes of Exercise per Session:    Stress:     Feeling of Stress :    Social Connections:     Frequency of Communication with Friends and Family:     Frequency of Social Gatherings with Friends and Family:     Attends [employer] Services:     Active Member of Clubs or Organizations:     Attends Club or Organization Meetings:     Marital Status:    Intimate Partner Violence:     Fear of Current or Ex-Partner:     Emotionally Abused:     Physically Abused:     Sexually Abused:        Review of Systems:   · Constitutional: there has been no unanticipated weight loss. There's been no change in energy level, sleep pattern, or activity level. · Eyes: No visual changes or diplopia. No scleral icterus. · ENT: No Headaches, hearing loss or vertigo. No mouth sores or sore throat. · Cardiovascular: Reviewed in HPI  · Respiratory: No cough or wheezing, no sputum production. No hematemesis. · Gastrointestinal: No abdominal pain, appetite loss, blood in stools. No change in bowel or bladder habits. · Genitourinary: No dysuria, trouble voiding, or hematuria. · Musculoskeletal:  No gait disturbance, weakness or joint complaints. · Integumentary: No rash or pruritis. · Neurological: No headache, diplopia, change in muscle strength, numbness or tingling. No change in gait, balance, coordination, mood, affect, memory, mentation, behavior. · Psychiatric: No anxiety, no depression. · Endocrine: No malaise, fatigue or temperature intolerance. No excessive thirst, fluid intake, or urination. No tremor. · Hematologic/Lymphatic: No abnormal bruising or bleeding, blood clots or swollen lymph nodes. · Allergic/Immunologic: No nasal congestion or hives. Physical Examination:    Vitals:    08/11/21 0858 08/11/21 0948 08/11/21 1126 08/11/21 1534   BP: 113/66  111/69    Pulse: 86  73    Resp: 18  18 18   Temp: 98.3 °F (36.8 °C)  98.4 °F (36.9 °C)    TempSrc: Oral  Oral    SpO2: 92%  94% 97%   Weight:       Height:  5' 10\" (1.778 m)       Body mass index is 21.59 kg/m².      Wt Readings from Last 3 Encounters:   08/11/21 150 lb 8 oz (68.3 kg)   08/10/21 155 lb (70.3 kg)   08/10/21 155 lb (70.3 kg)     BP Readings from Last 3 Encounters:   08/11/21 111/69   08/11/21 (!) 101/50   08/10/21 128/72     Constitutional and General Appearance:   Chronically ill appearing  HEENT:  NC/AT  RYANN  No problems with hearing  Skin:  Warm, dry  Respiratory:  · Normal excursion and expansion without use of accessory muscles  · Resp Auscultation: Normal breath sounds without dullness  Cardiovascular:  · The apical impulses not displaced  · Heart tones are crisp and normal  · Cervical veins are not engorged  · The carotid upstroke is normal in amplitude and contour without delay or bruit  · JVP 10-11 cm H2O  RRR with nl S1 and S2 without m,r,g  · Peripheral pulses are symmetrical and full  · There is no clubbing, cyanosis of the extremities. · 1+ bilateral edema  · Femoral Arteries: 2+ and equal  · Pedal Pulses: 2+ and equal   Neck:  · No thyromegaly  Abdomen:  · No masses or tenderness  · Liver/Spleen: No Abnormalities Noted  Neurological/Psychiatric:  · Alert and oriented in all spheres  · Moves all extremities well  · Exhibits normal gait balance and coordination  · No abnormalities of mood, affect, memory, mentation, or behavior are noted    Labs were reviewed including labs from other hospital systems through [facility]. Cardiac testing was reviewed including echos, nuclear scans, cardiac catheterization, including from other hospital systems through [facility]. Assessment:    1. Acute on chronic diastolic HF  2. Anemia, rule out iron deficiency  3. Hypertension  4. Uncontrolled diabetes  5. CAD, recent cABG  6. Elevated troponin    Plan:  1. Start spironolactone 12.5 mg po qd  2. Get a repeat echo given her recent CABG and new onset HF  3. Will need an ARB  4. Continue beta blocker  5. Continue diuretic  6. CHF education reinforced. ~salt restriction  ~fluid restriction  ~medication compliance  ~daily weights and notify of any significant weight gain/loss  ~establish with CHF nurse  ~outpatient follow-up with our CHF team        I appreciate the opportunity of cooperating in the care of this patient.     Zak Whitley Timo Kauffman M.D., [facility]

## 2021-08-11 NOTE — PROGRESS NOTES
Received transfer handoff report from [facility] [address] at [facility] at this time. Speciality mattress ordered to prepare for patient arrival due to stage 4 pressure ulcer on sacrum.

## 2021-08-11 NOTE — PROGRESS NOTES
[facility]    8/11/2021 10:37 AM        Name: Anjum Lu Admitted: 8/11/2021  Primary Care Provider: Sander Page MD (Tel: [phone])      Chief complaint: shortness of breath. Brief History: Patient is a 73 yo female with hx CAD/CABG, PVD, COPD, chronic diastolic heart failure, sleep apnea, DM2, RA. She presented to [facility] with c/o shortness of breath, chest discomfort, orthopnea, BLE edema. On arrival in ER patient was hypoxic with O2 sat 82%. CXR showed pulmonary vascular congestion and bilateral pleural effusion, proBNP 23425. She was transferred to [facility] for admission for CHF exacerbation and started on IV Lasix. Subjective:  Presently resting in bed. Reports improvement in breathing. Says she has urinated several times since admission, mild shortness of breath with ambulation. Denies any chest pain, abdominal pain, nausea.      Reviewed interval ancillary notes    Current Medications  albuterol sulfate  (90 Base) MCG/ACT inhaler 2 puff, Q4H PRN  ipratropium-albuterol (DUONEB) nebulizer solution 1 ampule, Q4H WA  aspirin EC tablet 81 mg, Daily  atorvastatin (LIPITOR) tablet 40 mg, Nightly  calcium elemental (OSCAL) tablet 500 mg, Daily  cetirizine (ZYRTEC) tablet 10 mg, Daily  clopidogrel (PLAVIX) tablet 75 mg, Daily  cyclobenzaprine (FLEXERIL) tablet 10 mg, BID PRN  Roflumilast (DALIRESP) tablet 500 mcg, Daily  docusate sodium (COLACE) capsule 100 mg, BID PRN  DULoxetine (CYMBALTA) extended release capsule 60 mg, Daily  fluticasone (FLONASE) 50 MCG/ACT nasal spray 2 spray, Daily  insulin glargine (LANTUS;BASAGLAR) injection pen 15 Units, Nightly  latanoprost (XALATAN) 0.005 % ophthalmic solution 1 drop, Nightly  metoprolol tartrate (LOPRESSOR) tablet 12.5 mg, BID  pantoprazole (PROTONIX) tablet 40 mg, QAM AC  predniSONE (DELTASONE) tablet 4 mg, Daily  vitamin D3 (CHOLECALCIFEROL) tablet 5,000 Units, Daily  insulin lispro (1 Unit Dial) 0-6 Units, TID WC  insulin lispro (1 Unit Dial) 0-3 Units, Nightly  glucose (GLUTOSE) 40 % oral gel 15 g, PRN  dextrose 50 % IV solution, PRN  glucagon (rDNA) injection 1 mg, PRN  dextrose 5 % solution, PRN  sodium chloride flush 0.9 % injection 5-40 mL, 2 times per day  sodium chloride flush 0.9 % injection 10 mL, PRN  0.9 % sodium chloride infusion, PRN  ondansetron (ZOFRAN-ODT) disintegrating tablet 4 mg, Q8H PRN   Or  ondansetron (ZOFRAN) injection 4 mg, Q6H PRN  polyethylene glycol (GLYCOLAX) packet 17 g, Daily PRN  acetaminophen (TYLENOL) tablet 650 mg, Q6H PRN   Or  acetaminophen (TYLENOL) suppository 650 mg, Q6H PRN  enoxaparin (LOVENOX) injection 40 mg, Daily  furosemide (LASIX) injection 40 mg, BID  magnesium sulfate 2000 mg in 50 mL IVPB premix, Once        Objective:  /66   Pulse 86   Temp 98.3 °F (36.8 °C) (Oral)   Resp 18   Ht 5' 10\" (1.778 m)   Wt 150 lb 8 oz (68.3 kg)   SpO2 92%   BMI 21.59 kg/m²     Intake/Output Summary (Last 24 hours) at 8/11/2021 1037  Last data filed at 8/11/2021 1025  Gross per 24 hour   Intake 370 ml   Output 700 ml   Net -330 ml      Wt Readings from Last 3 Encounters:   08/11/21 150 lb 8 oz (68.3 kg)   08/10/21 155 lb (70.3 kg)   08/10/21 155 lb (70.3 kg)       General appearance:  Appears comfortable  Eyes: Sclera clear. Pupils equal.  ENT: Moist oral mucosa. Trachea midline, no adenopathy. Cardiovascular: Regular rhythm, normal S1, S2. No murmur. No edema in lower extremities  Respiratory: Not using accessory muscles. Diminished, bibasilar crackles, no wheeze. GI: Abdomen soft, no tenderness, not distended, normal bowel sounds  Musculoskeletal: No cyanosis in digits, neck supple  Neurology: CN 2-12 grossly intact. No speech or motor deficits  Psych: Normal affect.  Alert and oriented in time, place and person  Skin: Warm, dry, normal turgor, sacral wound with wound vac in place    Labs and Tests:  CBC:   Recent Labs     08/10/21  1735 08/11/21  0457   WBC 5.5 3.8*   HGB 9.9* 10.3*    250     BMP:    Recent Labs     08/10/21  1735 08/11/21  0457   * 135*   K 4.1 3.9   CL 97* 96*   CO2 24 29   BUN 12 11   CREATININE 0.7 0.7   GLUCOSE 235* 201*     Hepatic:   Recent Labs     08/10/21  1735 08/11/21  0457   AST 11* 9*   ALT 9* 8*   BILITOT 0.6 0.6   ALKPHOS 154* 136*       Results for Sky Mccann (MRN [id number]) as of 8/11/2021 10:46   Ref. Range 8/11/2021 04:13 8/11/2021 07:52   POC Glucose Latest Ref Range: 70 - 99 mg/dl 168 (H) 233 (H)       CXR 8/10/2021:  Vascular congestion.       Bibasilar pleural effusions and airspace disease, greater on the right, with   progression in the interval, most likely pulmonary edema. Echo 4/24/2021:  Summary   Normal left ventricle systolic function with an estimated ejection fraction   of 55%. No regional wall motion abnormalities are seen. Normal left ventricular diastolic filling pressure. Mild eccentric aortic regurgitation. Mild mitral and tricuspid regurgitation. Systolic pulmonary artery pressure (SPAP) is normal and estimated at 27 mmHg   (right atrial pressure 3 mmHg). Problem List  Active Problems:    Congestive heart failure of unknown etiology ([address]  Resolved Problems:    * No resolved hospital problems. *       Assessment & Plan:   1. Acute on chronic diastolic heart failure. CXR with pulmonary vascular congestion and bilateral pleural effusions, proBNP 20,636. Started on IV Lasix. Cardio consult pending. 2. Acute hypoxic respiratory failure. Documented O2 sat 82% on room air at [facility]. Most likely secondary to CHF exacerbation. She does not use O2 at home. Wean as tolerated. 3. Chest discomfort/CAD. Troponin 0.03, trend flat. In setting CHF. Continue asa and statin. Cardiology evaluation pending. 4. Question of accelerated junctional rhythm on admission EKG.  Evaluated by EP, determined to be sinus rhythm. Continue beta blockers, EP has signed off.    5. DM2, controlled. A1c 7.2. Takes Lantus 15 units nightly and sliding scale mealtime insulin at home. Continue Lantus and low dose correction. 6. COPD. Not in exacerbation, no change in cough or phlegm, no wheezes. Continue roflumilast and duonebs. 7. Rheumatoid arthritis. Takes prednisone 4 mg daily, continue. Diet: ADULT DIET; Regular; 4 carb choices (60 gm/meal); Low Sodium (2 gm); 2000 ml  Adult Oral Nutrition Supplement;  Low Calorie/High Protein Oral Supplement  Code:Full Code  DVT PPX: enoxaparin      LORENZO Peters CNP   8/11/2021 10:37 AM

## 2021-08-11 NOTE — PROGRESS NOTES
Comprehensive Nutrition Assessment    Type and Reason for Visit:  Initial, Consult, Wound, Patient Education    Nutrition Recommendations/Plan:   1. Ensure High Protein BID      Nutrition Assessment:  Pt is at risk for nutrition compromise AEB increased nutrient needs for wound healing. Noted pt with stage 4 area with wound vac to sacrum, & a dehisced surgical wound on left leg. Pt reports appetite is usually good up until a few days ago. Drinks Ensure @ home & verbalized awareness of higher protein needs to promote healing. Wt on admission 150 lb, indicating 15 lb wt loss in past 6 months (may be related to fluid loss from CHF/diuresis). Will provide Ensure High Protein BID & monitor for further nutrition concerns as identified. CHF education reviewed as well. Education included low sodium diet guidelines (3-4 gm Na+/day) and fluid restriction (64 oz/day). Reviewed foods to choose and foods to avoid, along with label reading and ways to add flavor to food. Pt currently tries to follow a low sodium diet at home and does not add salt to food. Pt states understanding of education. Time spent with patient: 5 minutes. Malnutrition Assessment:  Malnutrition Status:  No malnutrition    Context:  Acute Illness     Findings of the 6 clinical characteristics of malnutrition:  Energy Intake:  Mild decrease in energy intake (Comment)  Weight Loss:  No significant weight loss     Body Fat Loss:  No significant body fat loss     Muscle Mass Loss:  No significant muscle mass loss    Fluid Accumulation:  7 - Moderate to Severe Extremities   Strength:  Not Performed    Estimated Daily Nutrient Needs:  Energy (kcal):  1474-2596 (25-30 kcal/68kg); Weight Used for Energy Requirements:  Current     Protein (g):  95- 136 g(1.4-2.0g/68kg) (d/t stage 4 wound);  Weight Used for Protein Requirements:  Current        Fluid (ml/day):   ; Method Used for Fluid Requirements:  1 ml/kcal      Nutrition Related Findings: LBM 8/9; +2-+3 pitting edema BLE      Wounds:  Stage IV, Surgical Incision, Open Wounds       Current Nutrition Therapies:    ADULT DIET; Regular; 4 carb choices (60 gm/meal); Low Sodium (2 gm); 2000 ml    Anthropometric Measures:  · Height: 5' 10\" (177.8 cm)  · Current Body Weight: 150 lb (68 kg)   · Admission Body Weight:      · Usual Body Weight: 165 lb (74.8 kg) (6 months ago)     · Ideal Body Weight: 150 lbs; % Ideal Body Weight 100 %   · BMI: 21.5  · Adjusted Body Weight:  ; No Adjustment   · Adjusted BMI:      · BMI Categories: Normal Weight (BMI 18.5-24. 9)       Nutrition Diagnosis:   · Increased nutrient needs related to increase demand for energy/nutrients as evidenced by wounds    · Inadequate oral intake related to inadequate protein-energy intake as evidenced by poor intake prior to admission      Nutrition Interventions:   Food and/or Nutrient Delivery:  Start Oral Nutrition Supplement, Continue Current Diet  Nutrition Education/Counseling:  Education completed (5 min CHF)   Coordination of Nutrition Care:  Continue to monitor while inpatient    Goals:  po intake at least 50% of meals & supplements       Nutrition Monitoring and Evaluation:   Behavioral-Environmental Outcomes:  None Identified   Food/Nutrient Intake Outcomes:  Food and Nutrient Intake, Supplement Intake  Physical Signs/Symptoms Outcomes:  Weight, Fluid Status or Edema     Discharge Planning:     Too soon to determine     Electronically signed by Jim Solorio RD, LD on 8/11/21 at 10:07 AM EDT    Contact: [phone]

## 2021-08-11 NOTE — PROGRESS NOTES
Pt's wound vac is malfunctioning. Removed, and a wet to dry placed. Spoke to Art Hardy, our wound care RN who will be down to evaluate sacral and left leg wounds. Order to be placed by hospitalist to place our wound vac.

## 2021-08-11 NOTE — CARE COORDINATION
[facility] Contin[facility] Nurse  Consult Note       NAME:  Reema Larry  MEDICAL RECORD NUMBER:  [id number]  AGE: 72 y.o.    GENDER: female  : 1950  TODAY'S DATE:  2021    Subjective   Reason for WOCN Evaluation and Assessment: wounds sacrum and left leg, wound vac placment      Reema Larry is a 72 y.o. female referred by:   [x] Physician  [x] Nursing  [] Other:     Wound Identification:  Wound Type: pressure and non-healing surgical  Contributing Factors: chronic pressure , left leg wound dehisce   Wound History: present on admission  Current Wound Care Treatment:  Saline moistened gauze    Patient Goal of Care:  [x] Wound Healing  [] Odor Control  [] Palliative Care  [] Pain Control   [] Other:         PAST MEDICAL HISTORY        Diagnosis Date    Atherosclerosis of native artery of right lower extremity with rest pain ([address] 2017    Back pain     Branch retinal vein occlusion 2012    Bronchiectasis with acute exacerbation ([address]     Cellulitis and abscess of left leg 2021    Cellulitis of left lower extremity 2021    S/P vein harvest 2021 for CABG    Closed compression fracture of thoracic vertebra ([address] 01/15/2020    Closed fracture of facial bone with routine healing 2016    Closed jaw fracture ([address] 01/15/2020    Community acquired pneumonia of left lower lobe of lung     Compression fracture of L1 lumbar vertebra ([address] 01/15/2020    COPD (chronic obstructive pulmonary disease) ([address]     Fracture of tibial plateau, closed, left, initial encounter 2017    Minimally displaced zone I fracture of sacrum ([address] 2020    MRSA (methicillin resistant staph aureus) culture positive 2021    MRSA (methicillin resistant Staphylococcus aureus) 2021    wound    Mucus plugging of bronchi     Osteomyelitis of mandible 2017    Last Assessment & Plan:  Continue ceftriaxone, add flagyl     Osteoporosis with pathological fracture 09/25/2018    Severe RA and osteoporosis. Bone density test last year showed severe osteoporosis. Recently, two broken vertebrae (L1, L2) due to coughing. Diagnosed with tracheomalacia and stated she must cough very hard to clear phlegm. Was coughing due to upper respiratory infections which have been treated. Hx of laminectomy and recent kyphoplasty.    Refractured her jawbone which was previously repaired wi    Post herpetic neuralgia     Proximal humerus fracture 10/01/2019    Rheumatoid arthritis ([address]     Shingles 05/2020    Sleep apnea     Status post incision and drainage 07/2021    Left Leg    Temporal arteritis ([address] 07/10/2013    Tobacco use 10/19/2017    Tracheomalacia     Vitreous hemorrhage, right eye ([address] 02/21/2020       PAST SURGICAL HISTORY    Past Surgical History:   Procedure Laterality Date    ARTERY BIOPSY Right 03/01/2021    at [address]  05/30/2013    left temporal artery biopsy    BACK SURGERY  08/2020    BRONCHOSCOPY      BRONCHOSCOPY N/A 06/12/2019    BRONCHOSCOPY ALVEOLAR LAVAGE performed by Yfn Mo MD at [address]  05/03/2021    CATARACT REMOVAL      CORONARY ARTERY BYPASS GRAFT N/A 05/03/2021    CORONARY ARTERY BYPASS X3 WITH LEFT ATRIAL APPENDAGE CLIP, 5 LEVEL BILATERAL INTERCOSTAL NERVE BLOCK, STERNAL PLATING performed by Afshin Boles MD at [address]      IR MIDLINE CATH  7/14/2021    IR MIDLINE CATH 7/14/2021 [facility]    KNEE ARTHROSCOPY      left    KYPHOSIS SURGERY      LAMINECTOMY      LEG SURGERY Left 7/13/2021    INCISION AND DEBRIDEMENT LEFT LOWER LEG WOUND WITH POSSIBLE WOUND VAC PLACEMENT performed by Eryn Herr MD at [address] [place] [address]  02/2020    MEDIASTINOSCOPY N/A 05/05/2021    MEDIASTINAL EXPLORATION AND EVACUATION OF HEMATOMA performed by Efren Perry Anne Marie Duque MD at [address]  2021    sacral wound debridement    PRESSURE ULCER DEBRIDEMENT N/A 2021    SACRAL WOUND DEBRIDEMENT performed by Isabel Aragon MD at [address] SEPTOPLASTY  2013    FESS with balloon    SPINAL FUSION      TUBAL LIGATION      UPPER GASTROINTESTINAL ENDOSCOPY  2014    Dilitation       FAMILY HISTORY    Family History   Problem Relation Age of Onset    Diabetes Mother     Hypertension Mother     Asthma Other     Heart Disease Brother     Diabetes Brother     Diabetes Sister     Heart Disease Brother     Cancer Neg Hx     Emphysema Neg Hx     Heart Failure Neg Hx        SOCIAL HISTORY    Social History     Tobacco Use    Smoking status: Former Smoker     Packs/day: 1.00     Years: 20.00     Pack years: 20.00     Types: Cigarettes     Quit date: 1991     Years since quittin.3    Smokeless tobacco: Never Used   Vaping Use    Vaping Use: Never used   Substance Use Topics    Alcohol use: Not Currently     Alcohol/week: 0.0 standard drinks     Comment: rarely    Drug use: No       ALLERGIES    Allergies   Allergen Reactions    Atenolol      Cough         MEDICATIONS    No current facility-administered medications on file prior to encounter.      Current Outpatient Medications on File Prior to Encounter   Medication Sig Dispense Refill    DULoxetine (CYMBALTA) 60 MG extended release capsule Take 60 mg by mouth daily      DALIRESP 500 MCG tablet TAKE 1 TABLET BY MOUTH DAILY 30 tablet 11    clopidogrel (PLAVIX) 75 MG tablet Take 1 tablet by mouth daily 30 tablet 11    metoprolol tartrate (LOPRESSOR) 25 MG tablet Take 0.5 tablets by mouth 2 times daily Hold this medication for pulse <60 or systolic BP <919 30 tablet 11    predniSONE (DELTASONE) 1 MG tablet Take 4 mg by mouth daily      insulin glargine (LANTUS) 100 UNIT/ML injection vial Inject 15 Units into the skin nightly 1 vial 0    oxyCODONE-acetaminophen (PERCOCET)  MG per tablet Take 1 tablet by mouth daily.  docusate sodium (COLACE) 100 MG capsule Take 100 mg by mouth 2 times daily as needed for Constipation      gabapentin (NEURONTIN) 300 MG capsule Take 300 mg by mouth 2 times daily.  latanoprost (XALATAN) 0.005 % ophthalmic solution Place 1 drop into both eyes nightly      Teriparatide, Recombinant, (FORTEO) 600 MCG/2.4ML SOPN injection Inject 20 mcg into the skin daily      NARCAN 4 MG/0.1ML LIQD nasal spray as needed       morphine (MS CONTIN) 15 MG extended release tablet 15 mg 2 times daily.  ipratropium (ATROVENT) 0.06 % nasal spray USE 2 SPRAYS BY NASAL ROUTE 2-4 TIMES DAILY 1 Bottle 5    albuterol sulfate  (90 Base) MCG/ACT inhaler INHALE 2 PUFFS INTO THE LUNGS EVERY 4 HOURS AS NEEDED FOR WHEEZING 1 Inhaler 5    vitamin D (CHOLECALCIFEROL) 1000 UNIT TABS tablet Take 5,000 Units by mouth daily       calcium carbonate (OSCAL) 500 MG TABS tablet Take 500 mg by mouth daily      atorvastatin (LIPITOR) 40 MG tablet Take 40 mg by mouth      cyclobenzaprine (FLEXERIL) 10 MG tablet Take 10 mg by mouth 2 times daily as needed       Insulin Syringe-Needle U-100 31G X 5/16\" 0.5 ML MISC USE 5 TIMES DAILY      ACCU-CHEK CEDRICK PLUS strip TEST 4 TIMES DAILY  3    aspirin EC 81 MG EC tablet Take 1 tablet by mouth daily      insulin lispro (HUMALOG) 100 UNIT/ML injection vial Inject 0-12 Units into the skin 3 times daily (with meals)      Misc. Devices (ACAPELLA) MISC Take 1 Device by mouth as needed 1 each 0    fluticasone (FLONASE) 50 MCG/ACT nasal spray INHALE 2 SPRAYS IN EACH NOSTRIL DAILY 1 Bottle 5    cetirizine (ZYRTEC) 10 MG tablet Take 10 mg by mouth daily.         omeprazole (PRILOSEC) 40 MG capsule Take 40 mg by mouth daily          Objective    /70   Pulse 83   Temp 98.5 °F (36.9 °C) (Oral)   Resp 18   Ht 5' 10\" (1.778 m)   Wt 150 lb 8 oz (68.3 kg)   SpO2 95%   BMI 21.59 kg/m²     LABS:  WBC:    Lab Results   Component Value Date    WBC 3.8 08/11/2021     H/H:    Lab Results   Component Value Date    HGB 10.3 08/11/2021    HCT 31.6 08/11/2021     PTT:    Lab Results   Component Value Date    APTT 35.9 05/03/2021   [APTT}  PT/INR:    Lab Results   Component Value Date    PROTIME 12.4 07/14/2021    INR 1.09 07/14/2021     HgBA1c:    Lab Results   Component Value Date    LABA1C 7.2 08/11/2021       Assessment   Sven Risk Score: Sven Scale Score: 16    Patient Active Problem List   Diagnosis Code    Rheumatoid arthritis ([facility] M06.9    Psoriasis L40.9    GERD (gastroesophageal reflux disease) K21.9    History of tobacco use Z87.891    Hyponatremia E87.1    Anemia D64.9    Cylindrical bronchiectasis (HCC) J47.9    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    Immunocompromised state ([facility] D84.9    Hypokalemia E87.6    Coronary artery disease I25.10    Bilateral lower extremity edema R60.0    Essential hypertension I10    Lumbar spondylosis M47.816    Mitral valve insufficiency and aortic valve insufficiency I08.0    Mixed hyperlipidemia E78.2    Myopia of both eyes H52.13    Osteoporosis M81.0    Other chronic sinusitis J32.8    Primary open angle glaucoma (POAG) of both eyes, mild stage H40.1131    Primary osteoarthritis of right hip M16.11    Type 2 diabetes mellitus with unspecified diabetic retinopathy without macular edema ([facility]) E11.319    Uncontrolled type 2 diabetes mellitus with diabetic peripheral angiopathy without gangrene, with long-term current use of insulin ([facility]) E11.51, E11.65, Z79.4    Pressure ulcer of coccygeal region, stage 4 ([facility]) L89.154    NSTEMI (non-ST elevated myocardial infarction) ([facility] I21.4    Hx of CABG Z95.1    Mild malnutrition ([facility]) E44.1    Chronic diastolic congestive heart failure ([facility]) I50.32    Surgical wound dehiscence, initial encounter (at [facility] harvest site) T81.31XA    Congestive heart failure of unknown etiology ([facility]) I50.9 Measurements:  Negative Pressure Wound Therapy Sacrum Mid (Active)   $ Standard NPWT >50 sq cm PER TX $ Yes 08/11/21 1920   Wound Type Pressure ulcer: Stage IV 08/11/21 1920   Unit Type KCI 08/11/21 1920   Dressing Type Black foam 08/11/21 1920   Number of pieces used 1 08/11/21 1920   Cycle Continuous 08/11/21 1920   Target Pressure (mmHg) 125 08/11/21 1920   Intensity 3 08/11/21 1920   Irrigation Solution Sodium chloride 0.9% 07/14/21 1357   Instillation Volume  20 mL 07/14/21 1357   Soak Time  10 minutes 07/14/21 1357   Vac Frequency 3 hours 07/14/21 1357   Canister changed? No 08/04/21 1103   Dressing Status Changed 08/11/21 1920   Dressing Changed Changed/New 08/11/21 1920   Drainage Amount Small 07/14/21 1357   Drainage Description Serosanguinous 07/14/21 1357   Dressing Change Due 07/16/21 07/14/21 1357   Output (ml) 0 ml 07/14/21 1616   Wound Assessment Granulation tissue; Red 07/14/21 1357   Blanca-wound Assessment Clean;Dry; Intact 08/11/21 1622   Shape round 07/12/21 1256   Odor None 07/14/21 1357   Number of days: 77       Negative Pressure Wound Therapy Leg Left;Medial (Active)   $ Standard NPWT >50 sq cm PER TX $ Yes 08/11/21 1920   Wound Type Dehisced;Surgical 08/11/21 1920   Unit Type KCI 08/11/21 1920   Dressing Type Black foam 08/11/21 1920   Number of pieces used 2 08/11/21 1920   Cycle Continuous 08/11/21 1920   Target Pressure (mmHg) 125 08/11/21 1920   Intensity 3 08/11/21 1920   Canister changed? Other (Comment) 08/11/21 1920   Dressing Status Changed 08/11/21 1920   Dressing Changed Changed/New 08/11/21 1920   Wound Assessment Granulation tissue;Pink;Red;Slough 08/11/21 1920   Blanca-wound Assessment Dry; Intact 08/11/21 1920   Number of days: 0       Wound 12/18/20 #2, Sacrum, Pressure Injury, Stage 4, Onset 5/2020 (Active)   Wound Image   08/11/21 1920   Wound Etiology Pressure Stage  4 08/11/21 1920   Dressing Status Clean;Dry; Intact 08/11/21 1622   Wound Cleansed Cleansed with saline 08/11/21 1920   Dressing/Treatment Negative pressure wound therapy 08/11/21 1920   Offloading for Diabetic Foot Ulcers Other (comment) 07/13/21 0820   Dressing Change Due 08/13/21 08/11/21 1920   Wound Length (cm) 3 cm 08/11/21 1920   Wound Width (cm) 4.5 cm 08/11/21 1920   Wound Depth (cm) 0.4 cm 08/11/21 1920   Wound Surface Area (cm^2) 13.5 cm^2 08/11/21 1920   Change in Wound Size % (l*w) -8900 08/11/21 1920   Wound Volume (cm^3) 5.4 cm^3 08/11/21 1920   Wound Healing % -5900 08/11/21 1920   Post-Procedure Length (cm) 4 cm 08/04/21 1023   Post-Procedure Width (cm) 2 cm 08/04/21 1023   Post-Procedure Depth (cm) 1.5 cm 08/04/21 1023   Post-Procedure Surface Area (cm^2) 8 cm^2 08/04/21 1023   Post-Procedure Volume (cm^3) 12 cm^3 08/04/21 1023   Distance Tunneling (cm) 3 cm 08/04/21 1023   Tunneling Position ___ O'Clock 1 08/04/21 1023   Undermining Starts ___ O'Clock 9 o'clock 08/11/21 1920   Undermining Ends___ O'Clock 7 o'clock 08/11/21 1920   Undermining Maxium Distance (cm) 0 08/04/21 1023   Wound Assessment Granulation tissue;Pink/red;Slough 08/11/21 1920   Drainage Amount None 08/11/21 1920   Drainage Description Serosanguinous 08/04/21 0920   Odor None 08/04/21 0920   Blanca-wound Assessment Intact 08/11/21 1920   Margins Defined edges 08/11/21 1920   Wound Thickness Description not for Pressure Injury Full thickness 08/11/21 1920   Number of days: 236       Wound 07/07/21 #3, left medial leg, dehisced surgical, full thickness, onset 5/2021 (Active)   Wound Image   08/11/21 1920   Wound Etiology Non-Healing Surgical 08/11/21 1920   Dressing Status New dressing applied 08/11/21 1920   Wound Cleansed Cleansed with saline 08/11/21 1920   Dressing/Treatment Triad hydro/zinc oxide-based hydrophilic paste;Negative pressure wound therapy 08/11/21 1920   Offloading for Diabetic Foot Ulcers Other (comment) 07/14/21 1041   Dressing Change Due 08/13/21 08/11/21 1920   Wound Length (cm) 7.1 cm 08/11/21 1920   Wound Width (cm) 2.2 cm 08/11/21 1920   Wound Depth (cm) 2.2 cm 08/11/21 1920   Wound Surface Area (cm^2) 15.62 cm^2 08/11/21 1920   Change in Wound Size % (l*w) -316.53 08/11/21 1920   Wound Volume (cm^3) 34.364 cm^3 08/11/21 1920   Wound Healing % -1733 08/11/21 1920   Post-Procedure Length (cm) 7 cm 08/04/21 1023   Post-Procedure Width (cm) 2.3 cm 08/04/21 1023   Post-Procedure Depth (cm) 1.2 cm 08/04/21 1023   Post-Procedure Surface Area (cm^2) 16.1 cm^2 08/04/21 1023   Post-Procedure Volume (cm^3) 19.32 cm^3 08/04/21 1023   Distance Tunneling (cm) 1 cm 08/04/21 1023   Tunneling Position ___ O'Clock 11 08/04/21 1023   Undermining Starts ___ O'Clock 12 08/04/21 1023   Undermining Ends___ O'Clock 6 08/04/21 1023   Undermining Maxium Distance (cm) 1 08/04/21 1023   Wound Assessment Pale granulation tissue;Pink/red;Slough 08/11/21 1920   Drainage Amount None 08/11/21 1920   Drainage Description Serosanguinous 08/04/21 0920   Odor None 08/04/21 0920   Blanca-wound Assessment Dry/flaky; Intact 08/11/21 1920   Margins Attached edges; Defined edges 07/14/21 1041   Wound Thickness Description not for Pressure Injury Full thickness 08/11/21 1920   Number of days: 35     Patient from home with wound vac. Per order wounds were assessed, measured and photographed. Patient's wound vac was moved by nursing. Sacral wound pink, with granulation tissue and some maceration to the outer skin from 3 o'clock to 5 o'clock. Wound to left leg, has red granulation tissue with some slough noted at the 11 o'clock position. Wounds were cleansed with saline and black foam placed in wound beds. Trac pads placed at each wound and a y-connector was used to connect tubes to the cannister. Wound vac initiated with good seal.  Target pressure 125 mmHg. Patient tolerated well. Response to treatment:  Well tolerated by patient.      Pain Assessment:  Severity:  0 / 10  Quality of pain: N/A  Wound Pain Timing/Severity: none  Premedicated: No    Plan Plan of Care: If negative pressure therapy pump fails  Remove black foam dressing and place saline moistened gauze cover with foam dressing, and notify [phone] Soraida Bangura RN. Specialty Bed Required : Yes ,patient on specialty mattress  [x] Low Air Loss   [x] Pressure Redistribution  [] Fluid Immersion  [] Bariatric  [x] Total Pressure Relief  [] Other:     Current Diet: ADULT DIET; Regular; 4 carb choices (60 gm/meal); Low Sodium (2 gm); 2000 ml  Adult Oral Nutrition Supplement;  Low Calorie/High Protein Oral Supplement  Dietician consult:  Yes    Discharge Plan:  Placement for patient upon discharge: home with support    Patient appropriate for Outpatient [address]: Yes    Referrals:  []   [] [id number] [facility]  [] Supplies  [] Other    Patient/Caregiver Teaching:  Level of patient/caregiver understanding able to:   [x] Indicates understanding       [] Needs reinforcement  [] Unsuccessful      [] Verbal Understanding  [] Demonstrated understanding       [] No evidence of learning  [] Refused teaching         [] N/A       Electronically signed by  NERY Riley, RN  Wound/Ostomy Care on 8/11/2021 at 7:40 PM

## 2021-08-11 NOTE — PLAN OF CARE
Transfer note:    70-year-old female with history of CAD status post CABG, obstructive sleep apnea, COPD, rheumatoid arthritis, chronic diastolic CHF, uncontrolled diabetes type 2 with hyperglycemia (most recent hemoglobin A1c of 8.1), chronic nonzero troponin, who presents to the emergency room with complaints of shortness of breath that has been ongoing for about a week and a half. She describes exertional dyspnea, orthopnea, increased bilateral lower extremity swelling. She also describes some exertional chest discomfort. On arrival to the emergency room, patient was hypoxic with oxygen saturation of 82% on room air, requiring 4 L/min supplemental oxygen to maintain saturations above 90%. Chest x-ray reveals pulmonary vascular congestion and bibasilar pleural effusion likely secondary to pulmonary edema. proBNP elevated at 20,636. Troponin 0.03. EKG reveals accelerated junctional rhythm with septal Q waves and no acute ST changes. Assessment and plan:  1. Acute on chronic diastolic CHF. Continue IV Lasix. Monitor volume status closely. Maintain on fluid restriction to 2 L daily. Patient had recent echocardiogram on 4/24/2021, reveals normal ejection fraction of 55%. 2. Acute respiratory failure with hypoxia. Secondary to #1. Treat underlying CHF exacerbation. Continue pulse oximeter, wean oxygen as tolerated. 3. Chest discomfort, nonzero troponin of 0.03. Continue antiplatelet therapy, statin. Trend troponin. Monitor on telemetry. Cardiology consult to assist with the evaluation. 4. Accelerated junctional rhythm noted on EKG. Continue telemetry monitoring. Monitor electrolytes closely, replace as needed. Cardiology consult to assist with evaluation. 5. Uncontrolled diabetes type 2 with hyperglycemia. Continue scheduled Lantus, sliding scale insulin. Monitor Accu-Chek closely and adjust insulin dose as needed.   6. Other comorbidities: history of CAD status post CABG, obstructive sleep apnea, COPD, rheumatoid arthritis, chronic diastolic CHF, uncontrolled diabetes type 2 with hyperglycemia (most recent hemoglobin A1c of 8.1), chronic nonzero troponin.       SIGNED: Soheila Corbett MD, MPH . 8/11/2021

## 2021-08-11 NOTE — ED PROVIDER NOTES
I independently examined and evaluated Lorraine León. In brief, Lorraine León is a 72 y.o. female with a past medical history of rheumatoid arthritis, GERD, tracheobronchomalacia, coronary artery disease, hypertension, hyperlipidemia, diabetes, and CHF who presents to the ED complaining of shortness of breath. Patient reports shortness of breath for 10 days. Progressive. Worse with exertion. She is not on oxygen at home. She uses Lasix as needed. She does report some mild left-sided chest discomfort, states it occurs when she becomes short of breath. Denies true chest pain. Nonradiating. She does report cough, nonproductive. No fever. She has been vaccinated for coronavirus. Old records reviewed: Patient was sent in by her pulmonologist Dr. Gabriella Borden. Who she saw earlier today. Patient has recently been treated with Avelox and Z-Vamshi and prednisone. He sent her to the emergency department due to concern for acute heart failure. REVIEW OF SYSTEMS  All systems reviewed, pertinent positives per HPI otherwise noted to be negative. Focused exam revealed   PHYSICAL EXAM  /67   Pulse 90   Temp 98.3 °F (36.8 °C) (Oral)   Resp 24   Ht 5' 10\" (1.778 m)   Wt 155 lb (70.3 kg)   SpO2 96%   BMI 22.24 kg/m²    GENERAL APPEARANCE: Awake and alert. Cooperative. Chronically ill-appearing. No distress. HENT: Normocephalic. Atraumatic. Mucous membranes are moist  NECK: Supple. Full range of motion of the neck without stiffness or pain. EYES: PERRL. EOM's grossly intact. HEART/CHEST: RRR. No murmurs. Chest wall is not tender to palpation. LUNGS: Respirations unlabored. Diminished breath sounds bilaterally. Speaking comfortably in full sentences. Currently on 4 L nasal cannula which is a new oxygen requirement. ABDOMEN: No tenderness. Soft. Non-distended. No masses. No organomegaly. No guarding or rebound. MUSCULOSKELETAL: Lower extremity edema. Compartments soft. No deformity.   No tenderness in the extremities. All extremities neurovascularly intact. SKIN: Warm and dry. No acute rashes. NEUROLOGICAL: Alert and oriented. CN's 2-12 intact. No gross facial drooping. Strength 5/5, sensation intact. PSYCHIATRIC: Normal mood and affect. ED course / MDM:   Overall chronically ill appearing patient, in no acute distress, presenting for shortness of breath. Patient was sent in by her pulmonologist with concern for heart failure exacerbation. Patient is currently on 4 L on my examination, she was hypoxic to 82% on room air on arrival.  Physical exam remarkable for bilateral lower extremity edema, diminished breath sounds bilaterally. Differential diagnosis includes but is not limited to: Pneumonia, pneumothorax, pleural effusion, ACS, CHF exacerbation, COPD exacerbation, asthma, pulmonary embolism, arrhythmia, anemia    Labs, EKG, and imaging obtained. Workup showed:    XR CHEST PORTABLE   Final Result   Vascular congestion. Bibasilar pleural effusions and airspace disease, greater on the right, with   progression in the interval, most likely pulmonary edema. Chest x-ray shows bibasilar pleural effusions and pulmonary edema. It does not show evidence of pneumonia, pneumothorax. The Ekg interpreted by me shows  sinus arrhythmia with a rate of 83  Axis is   Normal  QTc is  prolonged  Intervals and Durations are unremarkable. ST Segments: no acute change  No significant change from prior EKG dated 7/11/21       ED Course as of Aug 11 1855   Tue Aug 10, 2021   2243 Night time home meds ordered    [ER]   2256 Covid swab negative    [ER]   2257 Blood gas shows no acidemia or hypercarbia. [ER]   2257 Troponin mildly elevated at 0.03, stable on delta. Patient denies any chest pain. Patient given rest of aspirin load, denies any pain at this time. Overall low suspicion for ACS. Patient had already taken 81 mg of aspirin today, did complete aspirin load.   Patient there are any questions or concerns please feel free to contact the dictating provider for clarification.         Neo Hager MD  08/11/21 [id number]

## 2021-08-11 NOTE — PROGRESS NOTES
4 Eyes Skin Assessment     The patient is being assess for   Admission    I agree that 2 RN's have performed a thorough Head to Toe Skin Assessment on the patient. ALL assessment sites listed below have been assessed. Areas assessed for pressure by both nurses:   []   Head, Face, and Ears   []   Shoulders, Back, and Chest, Abdomen  []   Arms, Elbows, and Hands   []   Coccyx, Sacrum, and Ischium  []   Legs, Feet, and Heels        Skin Assessed Under all Medical Devices by both nurses:  O2 device tubing              All Mepilex Borders were peeled back and area peeked at by both nurses:  No: no mepilex borders in place  Please list where Mepilex Borders are located:  none             **SHARE this note so that the co-signing nurse is able to place an eSignature**    Co-signer eSignature: Electronically signed by Wilian Lewis RN on 8/11/21 at 4:55 AM EDT    Does the Patient have Skin Breakdown related to pressure?   Yes LDA WOUND CARE was Initiated documentation include the Blanca-wound, Wound Assessment, Measurements, Dressing Treatment, Drainage, and Color\",     (Insert Photo here)         Sven Prevention initiated:  Yes   Wound Care Orders initiated:  Yes      [address]  nurse consulted for Pressure Injury (Stage 3,4, Unstageable, DTI, NWPT, Complex wounds)and New or Established Ostomies:  Yes      Primary Nurse eSignature: Electronically signed by Zo Alcantar RN on 8/11/21 at 4:56 AM EDT

## 2021-08-11 NOTE — CONSULTS
Cardiac Electrophysiology Consultation   Date: 8/11/2021  Admit Date:  8/11/2021  Reason for Consultation: Junctional rhythm  Consult Requesting Physician: Yazmin Lawler MD     No chief complaint on file. HPI: Osman Coe is a 72 y.o. female with history of coronary artery disease status post CABG, obstructive sleep apnea, COPD, rheumatoid arthritis, chronic heart failure with preserved ejection fraction, uncontrolled diabetes mellitus type 2 with a recent hemoglobin A1c of 8.1, who presented to the emergency department due to shortness of breath that has been going on for about a week and a half. She also notes exertional dyspnea, orthopnea, bilateral lower extremity swelling. Denies cough or fever. She denies palpitations, dizziness presyncope or syncope. Work-up that has been done so far has revealed that she was in congestive heart failure with x-ray showing bibasilar pleural effusion. She has an elevated proBNP. She is feeling better after she was started on diuresis. EP was consulted for accelerated junctional rhythm that was apparently seen on EKG.       Past Medical History:   Diagnosis Date    Atherosclerosis of native artery of right lower extremity with rest pain ([address] 07/25/2017    Back pain     Branch retinal vein occlusion 07/20/2012    Bronchiectasis with acute exacerbation (HCC)     Cellulitis and abscess of left leg 7/11/2021    Cellulitis of left lower extremity 7/11/2021    S/P vein harvest 05/2021 for CABG    Closed compression fracture of thoracic vertebra ([address] 01/15/2020    Closed fracture of facial bone with routine healing 11/21/2016    Closed jaw fracture ([address] 01/15/2020    Community acquired pneumonia of left lower lobe of lung     Compression fracture of L1 lumbar vertebra ([address] 01/15/2020    COPD (chronic obstructive pulmonary disease) (HCC)     Fracture of tibial plateau, closed, left, initial encounter 12/05/2017    Minimally displaced zone I fracture of sacrum ([address] 09/02/2020    MRSA (methicillin resistant staph aureus) culture positive 07/2021    MRSA (methicillin resistant Staphylococcus aureus) 07/13/2021    wound    Mucus plugging of bronchi     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 Bone density test last year showed severe osteoporosis. Recently, two broken vertebrae (L1, L2) due to coughing. Diagnosed with tracheomalacia and stated she must cough very hard to clear phlegm. Was coughing due to upper respiratory infections which have been treated. Hx of laminectomy and recent kyphoplasty.    Refractured her jawbone which was previously repaired wi    Post herpetic neuralgia     Proximal humerus fracture 10/01/2019    Rheumatoid arthritis ([address]     Shingles 05/2020    Sleep apnea     Status post incision and drainage 07/2021    Left Leg    Temporal arteritis ([address] 07/10/2013    Tobacco use 10/19/2017    Tracheomalacia     Vitreous hemorrhage, right eye ([address] 02/21/2020        Past Surgical History:   Procedure Laterality Date    ARTERY BIOPSY Right 03/01/2021    at [address]  05/30/2013    left temporal artery biopsy    BACK SURGERY  08/2020    BRONCHOSCOPY      BRONCHOSCOPY N/A 06/12/2019    BRONCHOSCOPY ALVEOLAR LAVAGE performed by Zay Clark MD at [facility]  05/03/2021    CATARACT REMOVAL      CORONARY ARTERY BYPASS GRAFT N/A 05/03/2021    CORONARY ARTERY BYPASS X3 WITH LEFT ATRIAL APPENDAGE CLIP, 5 LEVEL BILATERAL INTERCOSTAL NERVE BLOCK, STERNAL PLATING performed by Ac Moore MD at [address]      IR MIDLINE CATH  7/14/2021    IR MIDLINE CATH 7/14/2021 [facility] SPECIAL PROCEDURES    KNEE ARTHROSCOPY      left    KYPHOSIS SURGERY      LAMINECTOMY      LEG SURGERY Left 2021    INCISION AND DEBRIDEMENT LEFT LOWER LEG WOUND WITH POSSIBLE WOUND VAC PLACEMENT performed by Carlos Belle MD at [address] [place] [address]  2020    MEDIASTINOSCOPY N/A 2021    MEDIASTINAL EXPLORATION AND EVACUATION OF HEMATOMA performed by Vivienne Nevarez MD at [facility] 6  2021    sacral wound debridement    PRESSURE ULCER DEBRIDEMENT N/A 2021    SACRAL WOUND DEBRIDEMENT performed by Isabel Aragon MD at [facility] SEPTOPLASTY  2013    FESS with balloon    SPINAL FUSION      TUBAL LIGATION      UPPER GASTROINTESTINAL ENDOSCOPY  2014    Dilitation       Allergies   Allergen Reactions    Atenolol      Cough         Social History:  Reviewed. reports that she quit smoking about 30 years ago. Her smoking use included cigarettes. She has a 20.00 pack-year smoking history. She has never used smokeless tobacco. She reports previous alcohol use. She reports that she does not use drugs. Family History:  Reviewed. family history includes Asthma in an other family member; Diabetes in her brother, mother, and sister; Heart Disease in her brother and brother; Hypertension in her mother. No premature CAD. Review of System:  Pertinent positive and negatives have been noted in the HPI. The rest are negative.     Physical Examination:  Vitals:    21 1126   BP: 111/69   Pulse: 73   Resp: 18   Temp: 98.4 °F (36.9 °C)   SpO2: 94%        Intake/Output Summary (Last 24 hours) at 2021 1148  Last data filed at 2021 1025  Gross per 24 hour   Intake 370 ml   Output 700 ml   Net -330 ml     In: 370 [P.O.:360; I.V.:10]  Out: 700    Wt Readings from Last 3 Encounters:   21 150 lb 8 oz (68.3 kg)   08/10/21 155 lb (70.3 kg)   08/10/21 155 lb (70.3 kg)     Temp  Av.1 °F (36.7 °C)  Min: 97.6 °F (36.4 °C)  Max: 98.4 °F (36.9 °C)  Pulse  Av.5  Min: 73  Max: 90  BP  Min: 101/50  Max: 133/67  SpO2  Av.9 %  Min: 87 %  Max: 100 %    · Telemetry: Sinus rhythm. · Constitutional: She is conversant laying in bed in no acute distress. · Head: Normocephalic and atraumatic. · Mouth/Throat: Lips appear moist. Oropharynx is clear and moist.  · Eyes: Conjunctivae normal. EOM are normal.   · Neck: Neck supple. Supple, no JVD  · Cardiovascular: Normal rate, regular rhythm. No murmurs, rubs or gallops. · Pulmonary/Chest: Bilateral respiratory sounds present. No wheeze or crackles  · Abdominal: Soft. Normal bowel sounds present. No distension, No tenderness. · Musculoskeletal:No pitting edema . · Neurological: Alert and oriented. Grossly normal   · Skin: Skin is warm and dry. .  · Psychiatric: No anxiety nor agitation. ·   Labs:  Reviewed. Recent Labs     08/10/21  1735 08/11/21  0457   * 135*   K 4.1 3.9   CL 97* 96*   CO2 24 29   PHOS  --  3.2   BUN 12 11   CREATININE 0.7 0.7     Recent Labs     08/10/21  1735 08/11/21  0457   WBC 5.5 3.8*   HGB 9.9* 10.3*   HCT 30.9* 31.6*   MCV 84.2 83.2    250     Lab Results   Component Value Date    TROPONINI 0.02 2021     No results found for: BNP  Lab Results   Component Value Date    PROTIME 12.4 2021    PROTIME 12.1 2021    PROTIME 16.5 2021    INR 1.09 2021    INR 1.04 2021    INR 1.42 2021     Lab Results   Component Value Date    CHOL 86 2021    HDL 38 2021    HDL 43 2010    TRIG 64 2021       Diagnostic and imaging results that I personally reviewed. ECG: I personally interpreted the presenting EKG which shows normal sinus rhythm. There is baseline artifact but there are clear P waves in the V5 and V6. Echo: 2021 3 showed left ventricular ejection fraction of 55%. Cath: 2021 this showed severe multivessel coronary artery disease for which she underwent CABG.     I independently reviewed the ECG, telemetry, serology, echocardiographic results.     Scheduled Meds:   ipratropium-albuterol  1 ampule Inhalation Q4H WA    aspirin EC  81 mg Oral Daily    atorvastatin  40 mg Oral Nightly    calcium elemental  500 mg Oral Daily    cetirizine  10 mg Oral Daily    clopidogrel  75 mg Oral Daily    Roflumilast  500 mcg Oral Daily    DULoxetine  60 mg Oral Daily    fluticasone  2 spray Each Nostril Daily    insulin glargine  15 Units Subcutaneous Nightly    latanoprost  1 drop Both Eyes Nightly    metoprolol tartrate  12.5 mg Oral BID    pantoprazole  40 mg Oral QAM AC    predniSONE  4 mg Oral Daily    vitamin D3  5,000 Units Oral Daily    insulin lispro  0-6 Units Subcutaneous TID WC    insulin lispro  0-3 Units Subcutaneous Nightly    sodium chloride flush  5-40 mL Intravenous 2 times per day    enoxaparin  40 mg Subcutaneous Daily    furosemide  40 mg Intravenous BID     Continuous Infusions:   dextrose      sodium chloride       PRN Meds:.albuterol sulfate HFA, cyclobenzaprine, docusate sodium, glucose, dextrose, glucagon (rDNA), dextrose, sodium chloride flush, sodium chloride, ondansetron **OR** ondansetron, polyethylene glycol, acetaminophen **OR** acetaminophen     Problem List:   Patient Active Problem List    Diagnosis Date Noted    Congestive heart failure of unknown etiology ([facility] 08/11/2021    Mild malnutrition ([facility] 07/12/2021    Surgical wound dehiscence, initial encounter (at [facility] harvest site) 07/12/2021    Chronic diastolic congestive heart failure (HCC)     Hx of CABG 07/11/2021    NSTEMI (non-ST elevated myocardial infarction) ([facility] 04/23/2021    Lumbar spondylosis 12/21/2020    Pressure ulcer of coccygeal region, stage 4 ([facility] 12/21/2020    Myopia of both eyes 02/21/2020    Primary open angle glaucoma (POAG) of both eyes, mild stage 02/21/2020    Type 2 diabetes mellitus with unspecified diabetic retinopathy without macular edema ([facility] 02/21/2020    Hypokalemia     Tracheobronchomalacia     Immunocompromised state ([facility]     Cylindrical bronchiectasis ([facility] 12/19/2018    Osteoporosis 09/25/2018    Primary osteoarthritis of right hip 09/25/2018    History of tobacco use 10/19/2017    Hyponatremia 10/19/2017    Anemia 10/19/2017    Psoriasis     GERD (gastroesophageal reflux disease)     Coronary artery disease 07/03/2017    Uncontrolled type 2 diabetes mellitus with diabetic peripheral angiopathy without gangrene, with long-term current use of insulin ([facility] 06/28/2017    Mitral valve insufficiency and aortic valve insufficiency 03/02/2017    Bilateral lower extremity edema 11/17/2015    Other chronic sinusitis 06/16/2014    Mixed hyperlipidemia 12/26/2012    Rheumatoid arthritis ([facility] 01/19/2012    Essential hypertension 12/22/2011      Active Hospital Problems    Diagnosis Date Noted    Congestive heart failure of unknown etiology ([facility] [I50.9] 08/11/2021         Consultation Assessment and Recommendation(s):  1. Question of junctional tachycardia. · I personally reviewed the ECG which was done yesterday as well as the telemetry. The ECG has artifact but there is clear P waves in V6 which shows the patient was in sinus rhythm. Telemetry also showed the patient has been in sinus rhythm with no evidence of junctional tachycardia. · Continue all cardiac medication including beta-blockers. 2. Acute on chronic heart failure preserved EF. This is supported by both chest x-ray and patient's history. She also has elevated proBNP. Heart failure team has been consulted. Continue medications as per the recommendations. · Echocardiogram has been ordered. 3. Diabetes mellitus type 2. This is better controlled as evidenced by the A1c 7.2.  4. COPD. Not in exacerbation. Thank you for allowing me to participate in the care of [address] [place] [address] . If you have any questions/comments, please do not hesitate to contact us. EP will sign off.       Electronically signed by Renata Tamayo MD on 8/11/2021 at 11:48 AM  Cardiac Electrophysiology  [facility]

## 2021-08-11 NOTE — PLAN OF CARE
Nutrition Problem #1: Increased nutrient needs  Intervention: Food and/or Nutrient Delivery: Start Oral Nutrition Supplement, Modify Current Diet  Nutritional Goals: po intake at least 50% of meals & supplements

## 2021-08-11 NOTE — ED NOTES
Pt requesting that writer inform her daughter Jeannette Magallon, that she will be transferred to [place]. Writer left a voicemail for Jeannette Magallon to return call to the ED.       Leander Camacho RN  08/11/21 [id number]

## 2021-08-11 NOTE — DISCHARGE INSTR - DIET
For nutrition questions after discharge please call the Registered Dietitian at [phone]. Heart Failure Nutrition Therapy  This diet will help you feel better and support your heart by reducing symptoms of fluid retention, shortness of breath and swelling. You should focus on:   Limiting sodium in your diet by reading labels and limiting foods high in sodium. o Limit your daily sodium intake to 2,000 mg per day.  o Select foods with 140 mg of sodium or less per serving. o Foods with more than 300 mg of sodium per serving may not fit into a reduced-sodium meal plan.  o Check serving sizes. If you eat more than 1 serving, you will get more sodium than the amount listed.  Limiting fluid in your diet. o Ask your doctor how much fluid you can have per day  o Remember foods that are liquid at room temperature such as popsicles, soup, ice cream and Jell-O are fluids.  Checking your weight to make sure you're not retaining too much fluid.  o Weigh yourself every morning. If you gain 3 or more pounds in one day or 5 pounds within 1 week, call your doctor. Foods to choose and avoid:   Avoid processed foods. Eat more fresh foods. o Fresh and frozen fruits and vegetables are good choices. o Choose fresh meats. Avoid processed meats such as cronin, sausage and hot dogs.  Do not add salt to your food while cooking or at the table. o Try dry or fresh herbs, pepper, lemon juice, or a sodium-free seasoning blend such as Mrs. Dash to add flavor to food. o Do not use a salt substitute.  Use caution at restaurants  o Restaurant foods are high in sodium. Ask for your food to be cooked without salt and request sauces and dressing to come on the side.       Carbohydrate Controlled Diet Education  Counting carbohydrate servings may help you control your blood sugar.     Foods with carbohydrates:  o Breads, crackers and cereals  o Pasta, rice and grains  o Starchy vegetables such as potatoes, corn and peas  o Beans and legumes  o Milk, soy milk and yogurt  o Fruits and fruit juices  o Sweets such as cakes, cookies, ice cream, jam and jelly   Carbohydrate servings:  o In diabetes meal planning, 1 serving of a food with carbohydrate has about 15 grams of carbohydrate. o Check serving sizes with measuring cups and spoons or a food scale.  o Read the Nutrition Facts on food labels to find out how many grams of carbohydrate are in foods you eat.  Meal Planning Tips:  o For many adults, eating 3 to 5 servings of carbohydrate foods at each meal and 1 or 2 carbohydrate servings for each snack works well.   o A healthy eating plan should include:  - At least 6 servings of fruits and non-starchy vegetables  - At least 6 servings of grains, beans and starchy vegetables, with at least 3 servings from whole grains.  - At least 2 servings of milk or milk products  - At least 4 to 6 ounces of meat or protein such as lean beef, lean pork, chicken, fish, low-fat cheese, or vegetarian choices such as soy. - Include healthy fats such as olive oil, canola oil and nuts.  Label reading tips:  o The nutrition facts panel on a label lists the grams of carbohydrate in 1 serving. Remember that one serving may contain more or less than 1 carb serving.   o To figure out how many carbohydrate servings are in food:  - First, look at the label's standard serving size. - Check the grams of total carbohydrate. This is the amount of carbohydrate in 1 standard serving.  - Divide the grams of total carbohydrate by 15. This number equals the number of carbohydrate servings in 1 standard serving. Remember: 1 carbohydrate serving is 15 grams of carbohydrate.

## 2021-08-12 LAB
A/G RATIO: 0.8 (ref 1.1–2.2)
ALBUMIN SERPL-MCNC: 2.8 G/DL (ref 3.4–5)
ALP BLD-CCNC: 125 U/L (ref 40–129)
ALT SERPL-CCNC: 7 U/L (ref 10–40)
ANION GAP SERPL CALCULATED.3IONS-SCNC: 11 MMOL/L (ref 3–16)
AST SERPL-CCNC: 9 U/L (ref 15–37)
BASOPHILS ABSOLUTE: 0 K/UL (ref 0–0.2)
BASOPHILS RELATIVE PERCENT: 0.8 %
BILIRUB SERPL-MCNC: 0.6 MG/DL (ref 0–1)
BUN BLDV-MCNC: 12 MG/DL (ref 7–20)
CALCIUM SERPL-MCNC: 8 MG/DL (ref 8.3–10.6)
CHLORIDE BLD-SCNC: 96 MMOL/L (ref 99–110)
CHOLESTEROL, TOTAL: 107 MG/DL (ref 0–199)
CO2: 30 MMOL/L (ref 21–32)
CREAT SERPL-MCNC: 0.7 MG/DL (ref 0.6–1.2)
EOSINOPHILS ABSOLUTE: 0.3 K/UL (ref 0–0.6)
EOSINOPHILS RELATIVE PERCENT: 7.4 %
GFR AFRICAN AMERICAN: >60
GFR NON-AFRICAN AMERICAN: >60
GLOBULIN: 3.7 G/DL
GLUCOSE BLD-MCNC: 164 MG/DL (ref 70–99)
GLUCOSE BLD-MCNC: 263 MG/DL (ref 70–99)
GLUCOSE BLD-MCNC: 319 MG/DL (ref 70–99)
GLUCOSE BLD-MCNC: 334 MG/DL (ref 70–99)
GLUCOSE BLD-MCNC: 86 MG/DL (ref 70–99)
GLUCOSE BLD-MCNC: 91 MG/DL (ref 70–99)
HCT VFR BLD CALC: 28.8 % (ref 36–48)
HDLC SERPL-MCNC: 34 MG/DL (ref 40–60)
HEMOGLOBIN: 9.3 G/DL (ref 12–16)
LDL CHOLESTEROL CALCULATED: 54 MG/DL
LV EF: 50 %
LVEF MODALITY: NORMAL
LYMPHOCYTES ABSOLUTE: 1.4 K/UL (ref 1–5.1)
LYMPHOCYTES RELATIVE PERCENT: 31.2 %
MAGNESIUM: 1.7 MG/DL (ref 1.8–2.4)
MCH RBC QN AUTO: 26.9 PG (ref 26–34)
MCHC RBC AUTO-ENTMCNC: 32.4 G/DL (ref 31–36)
MCV RBC AUTO: 82.8 FL (ref 80–100)
MONOCYTES ABSOLUTE: 0.4 K/UL (ref 0–1.3)
MONOCYTES RELATIVE PERCENT: 8.3 %
NEUTROPHILS ABSOLUTE: 2.3 K/UL (ref 1.7–7.7)
NEUTROPHILS RELATIVE PERCENT: 52.3 %
PDW BLD-RTO: 16.1 % (ref 12.4–15.4)
PERFORMED ON: ABNORMAL
PERFORMED ON: NORMAL
PERFORMED ON: NORMAL
PHOSPHORUS: 2.7 MG/DL (ref 2.5–4.9)
PLATELET # BLD: 273 K/UL (ref 135–450)
PMV BLD AUTO: 7.4 FL (ref 5–10.5)
POTASSIUM SERPL-SCNC: 3.2 MMOL/L (ref 3.5–5.1)
RBC # BLD: 3.48 M/UL (ref 4–5.2)
SODIUM BLD-SCNC: 137 MMOL/L (ref 136–145)
TOTAL PROTEIN: 6.5 G/DL (ref 6.4–8.2)
TRIGL SERPL-MCNC: 97 MG/DL (ref 0–150)
VLDLC SERPL CALC-MCNC: 19 MG/DL
WBC # BLD: 4.4 K/UL (ref 4–11)

## 2021-08-12 PROCEDURE — 97162 PT EVAL MOD COMPLEX 30 MIN: CPT

## 2021-08-12 PROCEDURE — 97116 GAIT TRAINING THERAPY: CPT

## 2021-08-12 PROCEDURE — 83735 ASSAY OF MAGNESIUM: CPT

## 2021-08-12 PROCEDURE — 6370000000 HC RX 637 (ALT 250 FOR IP): Performed by: PHYSICIAN ASSISTANT

## 2021-08-12 PROCEDURE — 85025 COMPLETE CBC W/AUTO DIFF WBC: CPT

## 2021-08-12 PROCEDURE — 6360000002 HC RX W HCPCS: Performed by: NURSE PRACTITIONER

## 2021-08-12 PROCEDURE — 97530 THERAPEUTIC ACTIVITIES: CPT

## 2021-08-12 PROCEDURE — 6370000000 HC RX 637 (ALT 250 FOR IP): Performed by: CLINICAL NURSE SPECIALIST

## 2021-08-12 PROCEDURE — 6370000000 HC RX 637 (ALT 250 FOR IP): Performed by: INTERNAL MEDICINE

## 2021-08-12 PROCEDURE — 99232 SBSQ HOSP IP/OBS MODERATE 35: CPT | Performed by: CLINICAL NURSE SPECIALIST

## 2021-08-12 PROCEDURE — 2700000000 HC OXYGEN THERAPY PER DAY

## 2021-08-12 PROCEDURE — 6360000002 HC RX W HCPCS: Performed by: INTERNAL MEDICINE

## 2021-08-12 PROCEDURE — 2580000003 HC RX 258: Performed by: INTERNAL MEDICINE

## 2021-08-12 PROCEDURE — 94761 N-INVAS EAR/PLS OXIMETRY MLT: CPT

## 2021-08-12 PROCEDURE — 97166 OT EVAL MOD COMPLEX 45 MIN: CPT

## 2021-08-12 PROCEDURE — 94640 AIRWAY INHALATION TREATMENT: CPT

## 2021-08-12 PROCEDURE — 80053 COMPREHEN METABOLIC PANEL: CPT

## 2021-08-12 PROCEDURE — 2060000000 HC ICU INTERMEDIATE R&B

## 2021-08-12 PROCEDURE — 6370000000 HC RX 637 (ALT 250 FOR IP): Performed by: NURSE PRACTITIONER

## 2021-08-12 PROCEDURE — 97535 SELF CARE MNGMENT TRAINING: CPT

## 2021-08-12 PROCEDURE — 36415 COLL VENOUS BLD VENIPUNCTURE: CPT

## 2021-08-12 PROCEDURE — 84100 ASSAY OF PHOSPHORUS: CPT

## 2021-08-12 RX ORDER — MIDODRINE HYDROCHLORIDE 5 MG/1
2.5 TABLET ORAL
Status: DISCONTINUED | OUTPATIENT
Start: 2021-08-12 | End: 2021-08-17 | Stop reason: HOSPADM

## 2021-08-12 RX ORDER — POTASSIUM CHLORIDE 20 MEQ/1
40 TABLET, EXTENDED RELEASE ORAL 2 TIMES DAILY WITH MEALS
Status: COMPLETED | OUTPATIENT
Start: 2021-08-12 | End: 2021-08-12

## 2021-08-12 RX ORDER — MAGNESIUM SULFATE IN WATER 40 MG/ML
2000 INJECTION, SOLUTION INTRAVENOUS ONCE
Status: COMPLETED | OUTPATIENT
Start: 2021-08-12 | End: 2021-08-12

## 2021-08-12 RX ADMIN — POTASSIUM CHLORIDE 40 MEQ: 20 TABLET, EXTENDED RELEASE ORAL at 17:38

## 2021-08-12 RX ADMIN — SPIRONOLACTONE 12.5 MG: 25 TABLET ORAL at 08:09

## 2021-08-12 RX ADMIN — MIDODRINE HYDROCHLORIDE 2.5 MG: 5 TABLET ORAL at 17:38

## 2021-08-12 RX ADMIN — PREDNISONE 4 MG: 1 TABLET ORAL at 10:16

## 2021-08-12 RX ADMIN — ATORVASTATIN CALCIUM 40 MG: 40 TABLET, FILM COATED ORAL at 21:08

## 2021-08-12 RX ADMIN — CETIRIZINE HYDROCHLORIDE 10 MG: 10 TABLET, FILM COATED ORAL at 08:08

## 2021-08-12 RX ADMIN — INSULIN LISPRO 4 UNITS: 100 INJECTION, SOLUTION INTRAVENOUS; SUBCUTANEOUS at 17:43

## 2021-08-12 RX ADMIN — MIDODRINE HYDROCHLORIDE 2.5 MG: 5 TABLET ORAL at 11:52

## 2021-08-12 RX ADMIN — IPRATROPIUM BROMIDE AND ALBUTEROL SULFATE 1 AMPULE: .5; 3 SOLUTION RESPIRATORY (INHALATION) at 19:26

## 2021-08-12 RX ADMIN — IPRATROPIUM BROMIDE AND ALBUTEROL SULFATE 1 AMPULE: .5; 3 SOLUTION RESPIRATORY (INHALATION) at 08:20

## 2021-08-12 RX ADMIN — IPRATROPIUM BROMIDE AND ALBUTEROL SULFATE 1 AMPULE: .5; 3 SOLUTION RESPIRATORY (INHALATION) at 15:17

## 2021-08-12 RX ADMIN — POTASSIUM CHLORIDE 40 MEQ: 20 TABLET, EXTENDED RELEASE ORAL at 10:16

## 2021-08-12 RX ADMIN — MORPHINE SULFATE 15 MG: 15 TABLET, FILM COATED, EXTENDED RELEASE ORAL at 08:07

## 2021-08-12 RX ADMIN — PANTOPRAZOLE SODIUM 40 MG: 40 TABLET, DELAYED RELEASE ORAL at 08:08

## 2021-08-12 RX ADMIN — CLOPIDOGREL BISULFATE 75 MG: 75 TABLET ORAL at 08:08

## 2021-08-12 RX ADMIN — Medication 10 ML: at 21:17

## 2021-08-12 RX ADMIN — DULOXETINE HYDROCHLORIDE 60 MG: 60 CAPSULE, DELAYED RELEASE ORAL at 08:08

## 2021-08-12 RX ADMIN — MORPHINE SULFATE 15 MG: 15 TABLET, FILM COATED, EXTENDED RELEASE ORAL at 21:08

## 2021-08-12 RX ADMIN — INSULIN GLARGINE 15 UNITS: 100 INJECTION, SOLUTION SUBCUTANEOUS at 21:13

## 2021-08-12 RX ADMIN — MAGNESIUM SULFATE HEPTAHYDRATE 2000 MG: 40 INJECTION, SOLUTION INTRAVENOUS at 10:20

## 2021-08-12 RX ADMIN — ASPIRIN 81 MG: 81 TABLET, COATED ORAL at 08:08

## 2021-08-12 RX ADMIN — FLUTICASONE PROPIONATE 2 SPRAY: 50 SPRAY, METERED NASAL at 08:07

## 2021-08-12 RX ADMIN — INSULIN LISPRO 2 UNITS: 100 INJECTION, SOLUTION INTRAVENOUS; SUBCUTANEOUS at 21:11

## 2021-08-12 RX ADMIN — CHOLECALCIFEROL TAB 125 MCG (5000 UNIT) 5000 UNITS: 125 TAB at 08:09

## 2021-08-12 RX ADMIN — Medication 500 MG: at 08:08

## 2021-08-12 RX ADMIN — SODIUM CHLORIDE 25 ML: 9 INJECTION, SOLUTION INTRAVENOUS at 01:04

## 2021-08-12 RX ADMIN — IRON SUCROSE 200 MG: 20 INJECTION, SOLUTION INTRAVENOUS at 01:04

## 2021-08-12 RX ADMIN — ENOXAPARIN SODIUM 40 MG: 40 INJECTION SUBCUTANEOUS at 08:08

## 2021-08-12 RX ADMIN — ROFLUMILAST 500 MCG: 500 TABLET ORAL at 08:08

## 2021-08-12 RX ADMIN — IPRATROPIUM BROMIDE AND ALBUTEROL SULFATE 1 AMPULE: .5; 3 SOLUTION RESPIRATORY (INHALATION) at 11:39

## 2021-08-12 RX ADMIN — FUROSEMIDE 40 MG: 10 INJECTION, SOLUTION INTRAMUSCULAR; INTRAVENOUS at 08:07

## 2021-08-12 RX ADMIN — LATANOPROST 1 DROP: 50 SOLUTION OPHTHALMIC at 21:08

## 2021-08-12 RX ADMIN — METOPROLOL TARTRATE 12.5 MG: 25 TABLET, FILM COATED ORAL at 21:17

## 2021-08-12 RX ADMIN — Medication 10 ML: at 08:09

## 2021-08-12 ASSESSMENT — PAIN DESCRIPTION - PAIN TYPE: TYPE: CHRONIC PAIN

## 2021-08-12 ASSESSMENT — PAIN SCALES - GENERAL
PAINLEVEL_OUTOF10: 8

## 2021-08-12 ASSESSMENT — PAIN DESCRIPTION - LOCATION: LOCATION: BACK

## 2021-08-12 NOTE — PROGRESS NOTES
Pt stated will remind family member about bringing in home unit. Refuses our Bipap.     Electronically signed by Hilda Capellan RCP on 8/12/2021 at 7:29 PM

## 2021-08-12 NOTE — PROGRESS NOTES
The Osborne Sleepiness Scale       The Osborne Sleepiness Scale is widely used in the field of sleep medicine as a subjective measure of a patient's sleepiness. The test is a list of eight situations in which you rate your tendency to become sleepy on a scale of 0, no chance to 3, high chance of dozing. Your score is based on a scale of 0 to 24. The scale estimates whether you are experiencing excessive sleepiness that possibly requires medical attention. How Sleepy Are You? How sleepy are you to doze off or fall asleep in the following situations? You should rate your chances of dozing off, not just feeling tired. Even if you have not done some of these things recently try to determine how they would have affected you.  For each situation, decide whether or not you would have:     0 = No chance of dozing 1 = Slight chance of dozing   2 = Moderate chance of  dozing 3 = High change of dozing       Situation                                                                                     Chance of Dozing    Sitting and reading  0 =  []  1 =    [] 2 =    [] 3 =    [x]    Watching TV  0 =  []  1 =    [] 2 =    [x] 3 =    []      Sitting inactive in public place (e.g., a theater or a meeting)  0 =  [x]  1 =    [] 2 =    [] 3 =    []    As a passenger in a car for an hour without a break          0  =  [x]  1 =    [] 2 =    [] 3 =    []    Lying down to rest in the afternoon when circumstances permit    0 =  []  1 =    [] 2 =    [x] 3 =    []    Sitting and talking to someone  0 =  [x]  1 =    [] 2 =    [] 3 =    []      Sitting quietly after a lunch without alcohol  0 =  [x]  1 =    [] 2 =    [] 3 =    []    In a car, while stopped for a few minutes in traffic                                                                      0 =  [x]  1 =    [] 2 =    [] 3 =    []    Total Score = 7    If your total score is 10 or greater, you are experiencing excessive sleepiness and should consider seeking a medical follow-up. Take a copy of this screening test to your primary care physician on your next office visit. Interpretation:      0 -   7: It is unlikely that you are abnormally sleepy. 8 -   9: You have an average amount of daytime sleepiness. 10 - 15: You may be excessively sleepy depending on the situation. You may want to consider seeking medical attention. 16 - 24: You are excessively sleepy and should consider seeking medical attention.       Electronically signed by Adan Rose RCP on 8/12/2021 at 3:19 PM

## 2021-08-12 NOTE — PROGRESS NOTES
Physician Progress Note      Pricilla Grant  [facility] #:                  [id number]  :                       1950  ADMIT DATE:       2021 3:55 AM  DISCH DATE:  RESPONDING  PROVIDER #:        Hamilton VENTURA - VERNON          QUERY TEXT:    Dear Charissa VENTURA CNP,    Patient admitted with Acute on Chronic Diastolic HF . Per Wound Ostomy nurse   consult note of 21, noted to also have pressure ulcer of sacrum and left   leg non-healing surgical wound. If possible, please document in progress   notes and discharge summary the location, present on admission status and   stage of the pressure ulcer and presence of non-healing surgical wound left   leg: The medical record reflects the following:  Risk Factors: Chronic pressure, immobility, increased nutrient needs  Clinical Indicators: Per WOCN note of 21 patient has present on admission   a stage IV mid sacrum wound and a non-healing wound to left medial leg. Treatment: Wound assessment, Negative pressure wound therapy, Oral Nutrition   supplement.     Thank you    Juan Luis Toth RN CDS      Stage 1:  Non-blanchable erythema of intact skin  Stage 2:  Abrasion, Blister, Partial-thickness skin loss, with exposed dermis  Stage 3:  Full-thickness skin loss with damage or necrosis of subcutaneous   tissue  Stage 4:  Full-thickness skin & soft tissue loss through to underlying muscle,   tendon or bone  Unstageable: Obscured full-thickness skin & tissue loss  Options provided:  -- Stage 4 Pressure Ulcer of Mid Sacrum and Non-healing surgical wound of Left   medial leg, present on admission  -- Other - I will add my own diagnosis  -- Disagree - Not applicable / Not valid  -- Disagree - Clinically unable to determine / Unknown  -- Refer to Clinical Documentation Reviewer    PROVIDER RESPONSE TEXT:    This patient has a Stage 4 pressure ulcer of the Mid Sacrum and a Non-healing   surgical wound of the Left medial leg which were present on admission.     Query created by: Lawyer Lefort on 8/12/2021 2:14 PM      Electronically signed by:  Joanne Michael CNP 8/12/2021 3:34 PM

## 2021-08-12 NOTE — PROGRESS NOTES
[facility]    8/12/2021 3:32 PM        Name: Andrzej Cotton . Admitted: 8/11/2021  Primary Care Provider: Ambreen Ureña MD (Tel: [phone])      Chief complaint: shortness of breath. Brief History: Patient is a 71 yo female with hx CAD/CABG, PVD, COPD, chronic diastolic heart failure, sleep apnea, DM2, RA. She presented to [facility] with c/o shortness of breath, chest discomfort, orthopnea, BLE edema. On arrival in ER patient was hypoxic with O2 sat 82%. CXR showed pulmonary vascular congestion and bilateral pleural effusion, proBNP 55927. She was transferred to [facility] for admission for CHF exacerbation and started on IV Lasix. Subjective:  Presently resting in bed. States breathing is a little improved today, had some difficulty breathing overnight. On IV Lasix with good diuresis. Denies chest pain. Noted palpitations this am with 18 beat run NSVT on monitor, potassium was 3.2. Denies abdominal pain, nausea, diarrhea, constipation. Anxious for return home to see grandchildren.       Reviewed interval ancillary notes    Current Medications  potassium chloride (KLOR-CON M) extended release tablet 40 mEq, BID WC  midodrine (PROAMATINE) tablet 2.5 mg, TID WC  albuterol sulfate  (90 Base) MCG/ACT inhaler 2 puff, Q4H PRN  ipratropium-albuterol (DUONEB) nebulizer solution 1 ampule, Q4H WA  aspirin EC tablet 81 mg, Daily  atorvastatin (LIPITOR) tablet 40 mg, Nightly  calcium elemental (OSCAL) tablet 500 mg, Daily  cetirizine (ZYRTEC) tablet 10 mg, Daily  clopidogrel (PLAVIX) tablet 75 mg, Daily  cyclobenzaprine (FLEXERIL) tablet 10 mg, BID PRN  Roflumilast (DALIRESP) tablet 500 mcg, Daily  docusate sodium (COLACE) capsule 100 mg, BID PRN  DULoxetine (CYMBALTA) extended release capsule 60 mg, Daily  fluticasone (FLONASE) 50 MCG/ACT nasal spray 2 spray, Daily  insulin glargine (LANTUS;BASAGLAR) injection pen 15 Units, Nightly  latanoprost (XALATAN) 0.005 % ophthalmic solution 1 drop, Nightly  metoprolol tartrate (LOPRESSOR) tablet 12.5 mg, BID  pantoprazole (PROTONIX) tablet 40 mg, QAM AC  predniSONE (DELTASONE) tablet 4 mg, Daily  vitamin D3 (CHOLECALCIFEROL) tablet 5,000 Units, Daily  insulin lispro (1 Unit Dial) 0-6 Units, TID WC  insulin lispro (1 Unit Dial) 0-3 Units, Nightly  glucose (GLUTOSE) 40 % oral gel 15 g, PRN  dextrose 50 % IV solution, PRN  glucagon (rDNA) injection 1 mg, PRN  dextrose 5 % solution, PRN  sodium chloride flush 0.9 % injection 5-40 mL, 2 times per day  sodium chloride flush 0.9 % injection 10 mL, PRN  0.9 % sodium chloride infusion, PRN  ondansetron (ZOFRAN-ODT) disintegrating tablet 4 mg, Q8H PRN   Or  ondansetron (ZOFRAN) injection 4 mg, Q6H PRN  polyethylene glycol (GLYCOLAX) packet 17 g, Daily PRN  acetaminophen (TYLENOL) tablet 650 mg, Q6H PRN   Or  acetaminophen (TYLENOL) suppository 650 mg, Q6H PRN  enoxaparin (LOVENOX) injection 40 mg, Daily  [Held by provider] furosemide (LASIX) injection 40 mg, BID  perflutren lipid microspheres (DEFINITY) injection 1.65 mg, ONCE PRN  morphine (MS CONTIN) extended release tablet 15 mg, 2 times per day  oxyCODONE-acetaminophen (PERCOCET)  MG per tablet 1 tablet, Daily PRN  spironolactone (ALDACTONE) tablet 12.5 mg, Daily  iron sucrose (VENOFER) 200 mg in sodium chloride 0.9 % 100 mL IVPB, Q24H        Objective:  BP (!) 101/54   Pulse 72   Temp 98 °F (36.7 °C) (Oral)   Resp 18   Ht 5' 10\" (1.778 m)   Wt 150 lb 8 oz (68.3 kg)   SpO2 94%   BMI 21.59 kg/m²     Intake/Output Summary (Last 24 hours) at 8/12/2021 1532  Last data filed at 8/12/2021 1311  Gross per 24 hour   Intake 840 ml   Output 3300 ml   Net -2460 ml      Wt Readings from Last 3 Encounters:   08/12/21 150 lb 8 oz (68.3 kg)   08/10/21 155 lb (70.3 kg)   08/10/21 155 lb (70.3 kg)     General:  Awake, alert, oriented in NAD  Skin:  Warm and dry. No unusual bruising or rash  Neck:  Supple. No JVD appreciated  Chest:  Normal effort. Diminished in bases, no wheezes  Cardiovascular:  RRR, normal S1/S2, no murmur/gallop/rub  Abdomen:  Soft, nontender, +bowel sounds  Extremities:  No edema, wound vac to left leg and sacral wounds  Neurological: No focal deficits  Psychological: Normal mood and affect      Labs and Tests:  CBC:   Recent Labs     08/10/21  1735 08/11/21  0457 08/12/21  0430   WBC 5.5 3.8* 4.4   HGB 9.9* 10.3* 9.3*    250 273     BMP:    Recent Labs     08/10/21  1735 08/11/21  0457 08/12/21  0430   * 135* 137   K 4.1 3.9 3.2*   CL 97* 96* 96*   CO2 24 29 30   BUN 12 11 12   CREATININE 0.7 0.7 0.7   GLUCOSE 235* 201* 164*     Hepatic:   Recent Labs     08/10/21  1735 08/11/21  0457 08/12/21  0430   AST 11* 9* 9*   ALT 9* 8* 7*   BILITOT 0.6 0.6 0.6   ALKPHOS 154* 136* 125       Results for Marcus Cheema (MRN [id number]) as of 8/12/2021 19:07   Ref. Range 8/11/2021 19:52 8/12/2021 01:35 8/12/2021 07:39 8/12/2021 11:24   POC Glucose Latest Ref Range: 70 - 99 mg/dl 394 (H) 263 (H) 86 91       CXR 8/10/2021:  Vascular congestion.       Bibasilar pleural effusions and airspace disease, greater on the right, with   progression in the interval, most likely pulmonary edema. Echo 4/24/2021:  Summary   Normal left ventricle systolic function with an estimated ejection fraction   of 55%. No regional wall motion abnormalities are seen. Normal left ventricular diastolic filling pressure. Mild eccentric aortic regurgitation. Mild mitral and tricuspid regurgitation. Systolic pulmonary artery pressure (SPAP) is normal and estimated at 27 mmHg   (right atrial pressure 3 mmHg).       Problem List  Active Problems:    Uncontrolled diabetes mellitus (HCC)    Acute on chronic diastolic heart failure due to coronary artery disease (HCC)    Congestive heart failure of unknown etiology ([address]    Acute respiratory failure with hypoxia ([facility] Chronic obstructive pulmonary disease (HCC)  Resolved Problems:    * No resolved hospital problems. *       Assessment & Plan:   1. Acute on chronic diastolic heart failure. CXR with pulmonary vascular congestion and bilateral pleural effusions, proBNP 20,636. Started on IV Lasix with good diuresis. Cardio on board pending. 2. Acute hypoxic respiratory failure. Documented O2 sat 82% on room air at [facility]. Most likely secondary to CHF exacerbation. She does not use O2 at home. O2 sats mid 90s on 2 liters, Wean as tolerated. 3. Chest discomfort/CAD. Troponin 0.03, trend flat. In setting CHF. Continue asa and statin. Cardiology on board, echo results pending. 4. Question of accelerated junctional rhythm on admission EKG. Evaluated by EP, determined to be sinus rhythm. Continue beta blockers, EP has signed off.    5. DM2, controlled. A1c 7.2. Takes Lantus 15 units nightly and sliding scale mealtime insulin at home. Continue Lantus and low dose correction. Continue to follow. 6. COPD. Not in exacerbation, no change in cough or phlegm, no wheezes. Continue roflumilast and duonebs. 7. Rheumatoid arthritis. Takes prednisone 4 mg daily, continue. 8. NSVT. Noted to have 18 beat run NSVT on tele associated with palpitations. Potassium low at 3.2 and replaced, magnesium 1.7 and replaced. Continue to monitor. Repeat BMP and magnesium in am.   9. Dehisced surgical wound left leg/sacral pressure injury stage IV. Evaluated by Wound Care, has wound vac in place. Diet: ADULT DIET; Regular; 4 carb choices (60 gm/meal); Low Sodium (2 gm); 2000 ml  Adult Oral Nutrition Supplement;  Low Calorie/High Protein Oral Supplement  Code:Full Code  DVT PPX: enoxaparin      LORENZO Arnett - CNP   8/12/2021 3:32 PM

## 2021-08-12 NOTE — CARE COORDINATION
Received a call from [facility] [phone]  asking for updates and resume orders at discharge. Received a call from [address],  [employer] who said they got a verbal order from her wound care center. Referral pending.

## 2021-08-12 NOTE — PROGRESS NOTES
Occupational Therapy   Occupational Therapy Initial Assessment  Date: 2021   Patient Name: Cheko Cline  MRN: [id number]     : 1950    Date of Service: 2021    Discharge Recommendations:Gris Taveras scored a 18/24 on the AM-PAC ADL Inpatient form. Current research shows that an AM-PAC score of 17 or less is typically not associated with a discharge to the patient's home setting. Based on the patient's AM-PAC score and their current ADL deficits, it is recommended that the patient have 3-5 sessions per week of Occupational Therapy at d/c to increase the patient's independence. Pt with low BP during mobility and unable to ambulate to bathroom today. Please see assessment section for further patient specific details. Pt wants to return to daughter's home. If patient discharges prior to next session this note will serve as a discharge summary. Please see below for the latest assessment towards goals. OT Equipment Recommendations  Equipment Needed: No    Assessment   Performance deficits / Impairments: Decreased functional mobility ; Decreased ADL status; Decreased endurance  Assessment: Pt is below her baseline level of occupational function, based on the above deficits associated wtih CHF. Pt would benefit from continued skilled acute OT services to address these deficits. Treatment Diagnosis: Decreased ADL status, functional mobility and endurance associated with CHF  Prognosis: Good  Decision Making: Low Complexity  History: As above  Exam: As above  Assistance / Modification: As above  OT Education: OT Role;Plan of Care;Transfer Training  Patient Education: D/C recommendation. Pt independently verbalized understanding. Barriers to Learning: None  REQUIRES OT FOLLOW UP: Yes  Activity Tolerance  Activity Tolerance: Treatment limited secondary to medical complications (free text)  Activity Tolerance: Pt had moderate SHIN and dizziness with ambulation.  BP 96/52 after ambulation, HR 79 bpm, SpO2 92% on RA. Pt maintained SpO2 >90% on RA throughout session. BP 91/47 after return to supine. RN notified of hypotension. Safety Devices  Safety Devices in place: Yes  Type of devices: All fall risk precautions in place; Left in bed;Call light within reach; Bed alarm in place; Patient at risk for falls;Nurse notified           Patient Diagnosis(es): There were no encounter diagnoses. has a past medical history of Atherosclerosis of native artery of right lower extremity with rest pain ([address] Back pain, Branch retinal vein occlusion, Bronchiectasis with acute exacerbation ([address] Cellulitis and abscess of left leg, Cellulitis of left lower extremity, Closed compression fracture of thoracic vertebra ([address] Closed fracture of facial bone with routine healing, Closed jaw fracture ([address] Community acquired pneumonia of left lower lobe of lung, Compression fracture of L1 lumbar vertebra ([address] COPD (chronic obstructive pulmonary disease) ([address] Fracture of tibial plateau, closed, left, initial encounter, Minimally displaced zone I fracture of sacrum (HCC), MRSA (methicillin resistant staph aureus) culture positive, MRSA (methicillin resistant Staphylococcus aureus), Mucus plugging of bronchi, Osteomyelitis of mandible, Osteoporosis with pathological fracture, Post herpetic neuralgia, Proximal humerus fracture, Rheumatoid arthritis ([address] Shingles, Sleep apnea, Status post incision and drainage, Temporal arteritis ([address] Tobacco use, Tracheomalacia, and Vitreous hemorrhage, right eye ([address] has a past surgical history that includes hernia repair; Mandible fracture surgery; Foot surgery; Elbow surgery; Cataract removal; Tubal ligation; Knee arthroscopy; Kyphosis surgery; laminectomy; Spinal fusion; Septoplasty (05/07/2013); Artery surgery (05/30/2013); Upper gastrointestinal endoscopy (04/08/2014); bronchoscopy; bronchoscopy (N/A, 06/12/2019);  Mandible fracture surgery (02/2020); back surgery (08/2020); other surgical history (01/08/2021); Pressure ulcer debridement (N/A, 01/08/2021); Artery Biopsy (Right, 03/01/2021); Coronary artery bypass graft (N/A, 05/03/2021); Mediastinoscopy (N/A, 05/05/2021); Cardiac surgery (05/03/2021); Leg Surgery (Left, 7/13/2021); and IR MIDLINE CATH (7/14/2021). Treatment Diagnosis: Decreased ADL status, functional mobility and endurance associated with CHF      Restrictions  Restrictions/Precautions  Restrictions/Precautions: Fall Risk, General Precautions, Contact Precautions (high fall risk; wound vac)  Position Activity Restriction  Other position/activity restrictions: Carlito Le is a 72 y.o. female for shortness of breath. Onset was yesterday. Context includes family reports the patient has had some increasing shortness of breath for a while however yesterday became more severe. Patient does not use oxygen at home. Patient does follow with pulmonary. Family reports they did a chest x-ray and the patient was sent to the ED to be evaluated. Subjective   General  Chart Reviewed: Yes  Patient assessed for rehabilitation services?: Yes  Family / Caregiver Present: No  Diagnosis: CHF  Subjective  Subjective: Pt side-lying in bed, pleasant and agreeable to OT eval. Pt reported she was just up to the [facility]. Pt denies pain.   Patient Currently in Pain: Denies  Pre Treatment Pain Screening  Intervention List: Patient able to continue with treatment  Vital Signs  Patient Currently in Pain: Denies  Social/Functional History  Social/Functional History  Lives With: Family (granddaughter, 24 yo, will be going to college; daughter lives up the street)  Type of [facility]: One level (One step from family room into kitchen without rail)  Home Access: Level entry  Bathroom Shower/Tub: Walk-in shower, Shower chair with back  Bathroom Toilet: Handicap height (BSC over toilet)  Bathroom Equipment: Grab bars in shower, Grab bars around toilet, Hand-held shower  Bathroom Accessibility: [facility]  Home Equipment: Rolling walker, Wheelchair-manual, Quad cane, [place], Hospital bed (anupama-walker)  ADL Assistance: [address]: Needs assistance (family does)  Homemaking Responsibilities: No  Ambulation Assistance: Independent (with RW)  Transfer Assistance: Independent  Active : No  Occupation: Retired  Type of occupation: orchard  IADL Comments: Sleeps in [address] [facility]. One fall in May due to LOC, pt went to hospital and had heart surgery. Additional Comments: Pt states family is talking about having her move in with them (no concrete plans), pt is sad she might need to leave her home of 50 years. Objective   Vision: Impaired  Vision Exceptions: Wears glasses for reading  Hearing: Within functional limits    Orientation  Overall Orientation Status: Within Normal Limits  Observation/Palpation  Observation: Wound vac on (L) LE and buttocks  Balance  Sitting Balance: Stand by assistance (dynamic; S static)  Standing Balance: Minimal assistance (CGA mostly, progressing to Min A as pt's BP decreased while ambulating.)  Standing Balance  Time: ~2 min, ~1 min  Activity: clothing mgt & functional mobility around bed to [facility]  Functional Mobility  Functional - Mobility Device: Rolling Walker  Activity: Other (CGA progressing to Min A)  Assist Level: Minimal assistance  Toilet Transfers  Toilet - Technique: Ambulating  Equipment Used: Standard bedside commode  Toilet Transfer: Contact guard assistance  Toilet Transfers Comments: Pt too dizzy, fatigued to ambulate to bathroom; she requested to sit down. ADL  LE Dressing: Minimal assistance (Assist with pullup clothing mgt & D brief d/t wires of wound vac; SBA don/doff socks seated on EOB)  Toileting: Contact guard assistance  Additional Comments: Pt declined further ADLs.   Tone RUE  RUE Tone: Normotonic  Tone LUE  LUE Tone: Normotonic  Coordination  Movements Are Fluid And Coordinated: Yes     Bed mobility  Supine to Sit: Minimal assistance (RN assisted pt)  Sit to Supine: Stand by assistance  Scooting: Independent  Transfers  Sit to stand: Contact guard assistance (from EOB, from [facility])  Stand to sit: Contact guard assistance (to BSC, to EOB)     Cognition  Overall Cognitive Status: WNL  Perception  Overall Perceptual Status: WFL     Sensation  Overall Sensation Status: WNL        LUE AROM (degrees)  LUE AROM : WFL  Left Hand AROM (degrees)  Left Hand AROM: WFL  RUE AROM (degrees)  RUE AROM : WFL  Right Hand AROM (degrees)  Right Hand AROM: WFL  LUE Strength  Gross LUE Strength: WFL (4+/5 shoulder, 5/5 elbow)  L Hand General: 4+/5  RUE Strength  Gross RUE Strength: WFL (4+/5 shoulder, 5/5 elbow)  R Hand General: 4+/5                   Plan   Plan  Times per week: 3-5  Current Treatment Recommendations: Endurance Training, Functional Mobility Training, Self-Care / ADL    G-Code     OutComes Score                                                  AM-PAC Score        AM-[facility] Inpatient Daily Activity Raw Score: 18 (08/12/21 1408)  AM-PAC Inpatient ADL T-Scale Score : 38.66 (08/12/21 1408)  ADL Inpatient CMS 0-100% Score: 46.65 (08/12/21 1408)  ADL Inpatient CMS G-Code Modifier : CK (08/12/21 1408)    Goals  Short term goals  Time Frame for Short term goals: Discharge  Short term goal 1: SBA for functional transfers to ADL surfaces w/RW  Short term goal 2: SBA for functional mobility w/RW for ADL activity  Short term goal 3: SBA for toileting  Short term goal 4: Set-up for UB bathing/dressing  Short term goal 5: SBA for LB bathing/dressing  Long term goals  Time Frame for Long term goals : LTG=STG  Long term goal 1: Pt to tolerate standing 3-5 min for ADL activity/functional mobility       Therapy Time   Individual Concurrent Group Co-treatment   Time In 0957         Time Out 1057         Minutes 60               Timed Code Treatment Minutes:  45 min    Total Treatment Minutes:  60 min    CAT [address], OT   [place] [address] OTR/L, [id number]

## 2021-08-12 NOTE — PROGRESS NOTES
[facility]   Daily Progress Note      Admit Date:  8/11/2021    HPI:    Ms. Eleonora Reyes is a 72year old female with history of CAD/CABG 5/31 urgent, peripheral vascular disease, copd, chronic diastolic heart failure, sleep apnea, DM2, RA on prednisone and chronic MS contin    She is was transferred from [place] (lack of beds) for shortness of breath, chest discomfort, orthopnea and BLE edema. sats 82%, cxr with pulmonary vascular congestion and bilateral pleural effusion probnp 20K. She was started on IV lasix. She was seen by EP for junctional rhythm determined to be NSR. Iron deficient anemia    Subjective:  Patient is being seen for diastolic heart failure. There were no acute overnight cardiac events. Creat 0.7 potassium 3.2 mag 1.7 both being replaced. She had 18 beats VT this morning. She was lightheaded with decreased BP this morning working with PT. Her albumin is 2.8     Objective:   BP (!) 103/48 Comment: nurse notified  Pulse 72   Temp 98 °F (36.7 °C) (Oral)   Resp 18   Ht 5' 10\" (1.778 m)   Wt 150 lb 8 oz (68.3 kg)   SpO2 95%   BMI 21.59 kg/m²     Intake/Output Summary (Last 24 hours) at 8/12/2021 1042  Last data filed at 8/12/2021 0955  Gross per 24 hour   Intake 879.85 ml   Output 3200 ml   Net -2320.15 ml          Physical Exam:  General:  Awake, alert, oriented in NAD  Skin:  Warm and dry. No unusual bruising or rash  Neck:  Supple. No JVD or carotid bruit appreciated  Chest:  Normal effort.   Clear to auscultation, no wheezes/rhonchi/rales  Cardiovascular:  RRR, S1/S2, no murmur/gallop/rub  Abdomen:  Soft, nontender, +bowel sounds  Extremities:  No edema  Neurological: No focal deficits  Psychological: Normal mood and affect      Medications:    potassium chloride  40 mEq Oral BID WC    magnesium sulfate  2,000 mg Intravenous Once    ipratropium-albuterol  1 ampule Inhalation Q4H WA    aspirin EC  81 mg Oral Daily    atorvastatin  40 mg Oral Nightly    calcium elemental  500 mg Oral Daily    cetirizine  10 mg Oral Daily    clopidogrel  75 mg Oral Daily    Roflumilast  500 mcg Oral Daily    DULoxetine  60 mg Oral Daily    fluticasone  2 spray Each Nostril Daily    insulin glargine  15 Units Subcutaneous Nightly    latanoprost  1 drop Both Eyes Nightly    metoprolol tartrate  12.5 mg Oral BID    pantoprazole  40 mg Oral QAM AC    predniSONE  4 mg Oral Daily    vitamin D3  5,000 Units Oral Daily    insulin lispro  0-6 Units Subcutaneous TID WC    insulin lispro  0-3 Units Subcutaneous Nightly    sodium chloride flush  5-40 mL Intravenous 2 times per day    enoxaparin  40 mg Subcutaneous Daily    furosemide  40 mg Intravenous BID    morphine  15 mg Oral 2 times per day    spironolactone  12.5 mg Oral Daily    iron sucrose (VENOFER) iv piggyback 100 mL (Admin over 60 minutes)  200 mg Intravenous Q24H      dextrose      sodium chloride 25 mL (08/12/21 0104)       Lab Data:  CBC:   Recent Labs     08/10/21  1735 08/11/21  0457 08/12/21  0430   WBC 5.5 3.8* 4.4   HGB 9.9* 10.3* 9.3*    250 273     BMP:    Recent Labs     08/10/21  1735 08/11/21  0457 08/12/21  0430   * 135* 137   K 4.1 3.9 3.2*   CO2 24 29 30   BUN 12 11 12   CREATININE 0.7 0.7 0.7     INR:  No results for input(s): INR in the last 72 hours. BNP:    Recent Labs     08/10/21  1735   PROBNP 20,636*         Diagnostics:  Echo 4/24/2021:  Summary   Normal left ventricle systolic function with an estimated ejection fraction   of 55%.   No regional wall motion abnormalities are seen.   Normal left ventricular diastolic filling pressure.   Mild eccentric aortic regurgitation.   Mild mitral and tricuspid regurgitation.   Systolic pulmonary artery pressure (SPAP) is normal and estimated at 27 mmHg   (right atrial pressure 3 mmHg    Assessment:    1. Acute on chronic diastolic heart failure  2. Acute hypoxic respiratory failure  3. CAD status post CABG, on plavix  4.   COPD  5.  RA on chronic prednisone  6. Uncontrolled diabetes  7. Iron deficiency anemia    Plan:    1. Continue aldactone 12.5 mg daily  2.  Echo pending  3. Will need arb at some point  4. Continue metoprolol 12.5 mg bid  5. Will hold IV lasix today while electrolytes are being replaced  6. CHF nurse following for diet education, fluid restriction and daily weights  7. Continue IV venofer for 4 doses  8. Start midodrine 2.5 mg three times a day    Discussed with patient who is agreeable with plan of care. Thank you for allowing me to participate in the care of your patient.     LORENZO Minor - CNS, CNS

## 2021-08-12 NOTE — PROGRESS NOTES
Pt had an 18 beat run of [id number] [facility]. Electrolytes reviewed and abnormal.  Pt had a rapid fluttering in her chest at that time. VSS. Message sent to Ming Zurita CNP for the patient to review.   Arlette Burrell RN

## 2021-08-12 NOTE — CONSULTS
[address] [place]egan 1950    History:  Past Medical History:   Diagnosis Date    Atherosclerosis of native artery of right lower extremity with rest pain ([address] 07/25/2017    Back pain     Branch retinal vein occlusion 07/20/2012    Bronchiectasis with acute exacerbation (HCC)     Cellulitis and abscess of left leg 7/11/2021    Cellulitis of left lower extremity 7/11/2021    S/P vein harvest 05/2021 for CABG    Closed compression fracture of thoracic vertebra ([address] 01/15/2020    Closed fracture of facial bone with routine healing 11/21/2016    Closed jaw fracture ([address] 01/15/2020    Community acquired pneumonia of left lower lobe of lung     Compression fracture of L1 lumbar vertebra ([address] 01/15/2020    COPD (chronic obstructive pulmonary disease) (HCC)     Fracture of tibial plateau, closed, left, initial encounter 12/05/2017    Minimally displaced zone I fracture of sacrum (HCC) 09/02/2020    MRSA (methicillin resistant staph aureus) culture positive 07/2021    MRSA (methicillin resistant Staphylococcus aureus) 07/13/2021    wound    Mucus plugging of bronchi     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 Bone density test last year showed severe osteoporosis. Recently, two broken vertebrae (L1, L2) due to coughing. Diagnosed with tracheomalacia and stated she must cough very hard to clear phlegm. Was coughing due to upper respiratory infections which have been treated. Hx of laminectomy and recent kyphoplasty.    Refractured her jawbone which was previously repaired wi    Post herpetic neuralgia     Proximal humerus fracture 10/01/2019    Rheumatoid arthritis ([address]     Shingles 05/2020    Sleep apnea     Status post incision and drainage 07/2021    Left Leg    Temporal arteritis ([address] Larry or nurse with any questions. PATIENT/CAREGIVER TEACHING:    Level of patient/caregiver understanding able to:   [place] ] Verbalize understanding [ ] Demonstrate understanding [ ] Teach back   [ ] Needs reinforcement [ ] Other:     Education Time: 25 min    Recommendations: 1. Patient will need follow up appointment within 7 days of discharge. 2. Educate further on fluid restriction 48 oz - 64 oz during inpatient stay so he can understand how to measure intake at home. 3. Review sodium restrictions. Encourage patient to not add table salt to her foods and avoid foods that are high in sodium. 4. Continue to educate on S/S. Stress the importance of calling the MD with the earliest signs of an acute exacerbation. 5. Emphasize daily weights - instruct patient to call the MD if she gains 2-3 lb in a day or 5 lb in a week. 6. Provided patient with CHF Resource Line for questions and concerns.      Cynthia Barrow RN     8/12/2021 12:41 PM - - -

## 2021-08-12 NOTE — PLAN OF CARE
Problem: Pain:  Description: Pain management should include both nonpharmacologic and pharmacologic interventions. Goal: Pain level will decrease  Description: Pain level will decrease  Outcome: Ongoing  Note: Pt with chronic pain. Continue MS Contin and PRN Percocet. Goal: Control of acute pain  Description: Control of acute pain  Outcome: Ongoing  Goal: Control of chronic pain  Description: Control of chronic pain  Outcome: Ongoing     Problem: Falls - Risk of:  Goal: Will remain free from falls  Description: Will remain free from falls  Outcome: Ongoing  Note: No falls noted. Bed check on & in place. Bed in lowest position, wheels are locked. Reminded patient to use call light  for assistance. Patient verbalized understanding of instructions. Call light & personal items w/ in reach. Will continue to monitor. Goal: Absence of physical injury  Description: Absence of physical injury  Outcome: Ongoing     Problem: Skin Integrity:  Goal: Will show no infection signs and symptoms  Description: Will show no infection signs and symptoms  Outcome: Ongoing  Note: Pt has a wound vac to two separate wounds - sacrum and left lateral leg. [facility] by [facility]. Goal: Absence of new skin breakdown  Description: Absence of new skin breakdown  Outcome: Ongoing     Problem: OXYGENATION/RESPIRATORY FUNCTION  Goal: Patient will maintain patent airway  Outcome: Ongoing  Note: PT is currently on room air. Goal: Patient will achieve/maintain normal respiratory rate/effort  Description: Respiratory rate and effort will be within normal limits for the patient  Outcome: Ongoing     Problem: HEMODYNAMIC STATUS  Goal: Patient has stable vital signs and fluid balance  Outcome: Ongoing  Note: PT's BP was slightly low. Lasix on hold, Metoprolol held this AM.  Midodrine added.      Problem: FLUID AND ELECTROLYTE IMBALANCE  Goal: Fluid and electrolyte balance are achieved/maintained  Outcome: Ongoing  Note: Mg and K replaced today. Problem: ACTIVITY INTOLERANCE/IMPAIRED MOBILITY  Goal: Mobility/activity is maintained at optimum level for patient  Outcome: Ongoing  Note: PT/OT following.      Problem: Nutrition  Goal: Optimal nutrition therapy  Outcome: Ongoing

## 2021-08-12 NOTE — PROGRESS NOTES
Physical Therapy    Facility/Department: [address]  Initial Assessment    NAME: Ion Mckeon  : 1950  MRN: [id number]    Date of Service: 2021    Discharge Recommendations:  PT Equipment Recommendations  Equipment Needed: No  Other: Defer to next level of care, if pt discharges home needs are met     Ion Mckeon scored a 17/24 on the AM-PAC short mobility form. Current research shows that an AM-PAC score of 17 or less is typically not associated with a discharge to the patient's home setting. Based on the patient's AM-PAC score and their current functional mobility deficits, it is recommended that the patient have 3-5 sessions per week of Physical Therapy at d/c to increase the patient's independence. Please see assessment section for further patient specific details. If patient discharges home she will need 24/7 assist and home PT (S3)    If patient discharges prior to next session this note will serve as a discharge summary. Please see below for the latest assessment towards goals. Assessment   Body structures, Functions, Activity limitations: Decreased functional mobility ; Decreased endurance;Decreased safe awareness;Decreased balance;Decreased ADL status; Decreased posture  Assessment: Pt is a 71 yo female admitted to [facility] for CHF exacerbation. The patient had limited tolerance to activity this date due to hypotension and was only able to ambulte 15 ft total with standing/seated rest breaks. The patient required CGA-Min for functional mobility this date. The patient is functioning below her baseline but is highly motiviated. Recommending continued skilled PT to safely progress tolerance to activity.   Treatment Diagnosis: Decreased tolerance to activity  Prognosis: Good  Decision Making: Medium Complexity  History: See below  Exam: MMT, balance, functional mobility  Clinical Presentation: Evolving  PT Education: Goals;PT Role;Plan of Care  Patient Education: Pt verbalized understanding  Barriers to Learning: None  REQUIRES PT FOLLOW UP: Yes  Activity Tolerance  Activity Tolerance: Treatment limited secondary to medical complications (free text); Patient limited by fatigue;Patient limited by endurance  Activity Tolerance: Pt had moderate SHIN and dizziness with ambulation. BP 96/52 after ambulation, HR 79 bpm, SpO2 92% on RA. Pt maintained SpO2 >90% on RA throughout session. BP 91/47 after return to supine. RN notified of hypotension. Patient Diagnosis(es): There were no encounter diagnoses. has a past medical history of Atherosclerosis of native artery of right lower extremity with rest pain ([address] Back pain, Branch retinal vein occlusion, Bronchiectasis with acute exacerbation ([address] Cellulitis and abscess of left leg, Cellulitis of left lower extremity, Closed compression fracture of thoracic vertebra ([address] Closed fracture of facial bone with routine healing, Closed jaw fracture ([address] Community acquired pneumonia of left lower lobe of lung, Compression fracture of L1 lumbar vertebra ([address] COPD (chronic obstructive pulmonary disease) ([address] Fracture of tibial plateau, closed, left, initial encounter, Minimally displaced zone I fracture of sacrum (HCC), MRSA (methicillin resistant staph aureus) culture positive, MRSA (methicillin resistant Staphylococcus aureus), Mucus plugging of bronchi, Osteomyelitis of mandible, Osteoporosis with pathological fracture, Post herpetic neuralgia, Proximal humerus fracture, Rheumatoid arthritis ([address] Shingles, Sleep apnea, Status post incision and drainage, Temporal arteritis ([address] Tobacco use, Tracheomalacia, and Vitreous hemorrhage, right eye ([address] has a past surgical history that includes hernia repair; Mandible fracture surgery; Foot surgery; Elbow surgery; Cataract removal; Tubal ligation; Knee arthroscopy; Kyphosis surgery; laminectomy; Spinal fusion; Septoplasty (05/07/2013); Artery surgery (05/30/2013);  Upper gastrointestinal endoscopy (2014); bronchoscopy; bronchoscopy (N/A, 2019); Mandible fracture surgery (2020); back surgery (2020); other surgical history (2021); Pressure ulcer debridement (N/A, 2021); Artery Biopsy (Right, 2021); Coronary artery bypass graft (N/A, 2021); Mediastinoscopy (N/A, 2021); Cardiac surgery (2021); Leg Surgery (Left, 2021); and IR MIDLINE CATH (2021). Restrictions  Restrictions/Precautions  Restrictions/Precautions: Fall Risk, General Precautions, Contact Precautions (high fall risk; wound vac)  Position Activity Restriction  Other position/activity restrictions: Padmini Kathleen is a 72 y.o. female for shortness of breath. Onset was yesterday. Context includes family reports the patient has had some increasing shortness of breath for a while however yesterday became more severe. Patient does not use oxygen at home. Patient does follow with pulmonary. Family reports they did a chest x-ray and the patient was sent to the ED to be evaluated. Vision/Hearing  Vision: Impaired  Vision Exceptions: Wears glasses for reading  Hearing: Within functional limits       Subjective  General  Chart Reviewed: Yes  Patient assessed for rehabilitation services?: Yes  Family / Caregiver Present: No  Diagnosis: CHF exacerbation  Follows Commands: Within Functional Limits  General Comment  Comments: Pt in (L) sidelying in bed upon therapist arrival. Agreeable to PT/OT eval.  Subjective  Subjective: 8/10 pain in back and (L) knee. States she got some medications earlier. States she can't take some of her usual meds due to her infection.   Pain Screening  Patient Currently in Pain: Denies  Vital Signs  Orthostatic B/P and Pulse?: Yes  Blood Pressure Lyin/47 (after return to bed, pt's BP 96/52 after ambulating to [facility])  Patient Currently in Pain: Yes  Orthostatic B/P and Pulse?: Yes  Pre Treatment Pain Screening  Intervention List: Patient able to continue with treatment    Orientation  Orientation  Overall Orientation Status: Within Normal Limits     Social/Functional History  Social/Functional History  Lives With: Family (granddaughter, 26 yo, will be going to college; daughter lives up the street)  Type of Home: House  Home Layout: One level (One step from family room into kitchen without rail)  Home Access: Level entry  Bathroom Shower/Tub: Walk-in shower, Shower chair with back  [employer]: Handicap height (BSC over toilet)  Bathroom Equipment: Grab bars in shower, Grab bars around toilet, Hand-held shower  Bathroom Accessibility: Walker accessible  Home Equipment: Rolling walker, Wheelchair-manual, Quad cane, [address] bed (anupama-walker)  ADL Assistance: Independent  Homemaking Assistance: Needs assistance (family does)  Homemaking Responsibilities: No  Ambulation Assistance: Independent (with RW)  Transfer Assistance: Independent  Active : No  Occupation: Retired  Type of occupation: orchard  IADL Comments: Sleeps in [facility]. One fall in May due to LOC, pt went to hospital and had heart surgery. Additional Comments: Pt states family is talking about having her move in with them (no concrete plans), pt is sad she might need to leave her home of 50 years.      Cognition   See OT note    Objective  Observation/Palpation  Observation: Wound vac on (L) LE and buttocks    AROM RLE (degrees)  RLE AROM: WFL  AROM LLE (degrees)  LLE AROM : WFL  Strength RLE  Strength RLE: WFL  Comment: 4+/5 hip flexion, knee flex/ext, ankle DF  Strength LLE  Strength LLE: WFL  Comment: 4+/5 hip flexion, knee flex/ext, ankle DF    Sensation  Overall Sensation Status: WNL     Bed mobility  Supine to Sit: Minimal assistance (RN assisted pt)  Sit to Supine: Stand by assistance  Scooting: Independent     Transfers  Sit to Stand: Contact guard assistance  Stand to sit: Contact guard assistance  Comment: to/from bed and BSC     Ambulation  Ambulation?: Yes  Ambulation 1  Surface: level tile  Device: Rolling Walker  Assistance: Contact guard assistance;Minimal assistance (CGA progressing to Min A)  Quality of Gait: Decreased step height/length, increasingly forward flexed posture with distance, maintains (B) knee flexion, slow daja  Distance: 10 ft + 2 ft + 3 ft  Comments: Pt required a standing rest break at 10 ft due to SHIN and fatigue, leaning heavily on her walker, pt unable to reach bathroom. Pt ambulated an additional 2 ft to reach the [facility] and back to bed. Balance  Posture: Fair  Sitting - Static: Good  Sitting - Dynamic: Good;-  Standing - Static: Fair  Standing - Dynamic: Fair  Comments: Indep for static sitting balance. SBA for dynamic sitting balance with lower body dressing at EOB. CGA for standing and dynamic standing balance with and without UE support for clothing management. Other activity  See OT note for assist with dressing and toiletting. Plan   Plan  Times per week: 3-5x  Current Treatment Recommendations: Strengthening, Transfer Training, Balance Training, Endurance Training, Gait Training, Functional Mobility Training, Neuromuscular Re-education, Safety Education & Training, Patient/Caregiver Education & Training, Equipment Evaluation, Education, & procurement  Safety Devices  Type of devices:  All fall risk precautions in place, Call light within reach, Bed alarm in place, Patient at risk for falls, Gait belt, Nurse notified, Left in bed    AM-PAC Score  AM-PAC Inpatient Mobility Raw Score : 17 (08/12/21 1107)  AM-PAC Inpatient T-Scale Score : 42.13 (08/12/21 1107)  Mobility Inpatient CMS 0-100% Score: 50.57 (08/12/21 1107)  Mobility Inpatient CMS G-Code Modifier : CK (08/12/21 1107)      Goals  Short term goals  Time Frame for Short term goals: Before discharge  Short term goal 1: Pt will complete bed mobility Mod I  Short term goal 2: Pt will complete sit<>stand with SBA  Short term goal 3: Pt will ambulate 50 ft with RW and SBA  Patient Goals Patient goals : Go home, but I may have to go to my daughter's house       Therapy Time   Individual Concurrent Group Co-treatment   Time In 0957         Time Out 1057         Minutes 60         Timed Code Treatment Minutes: [address] [facility], [address] [facility], DPT #[id number]

## 2021-08-12 NOTE — CARE COORDINATION
Discharge Planning Assessment    RN/SW discharge planner met with patient/ (and family member) to discuss reason for admission, current living situation, and potential needs at the time of discharge    Demographics/Insurance verified Yes/No    Current type of dwelling[place] arrangements:lives in private home - granddaughter lives with her. Patient POA/contact: татьяна Tirado [phone]    Level of function/Support: CABG 5/3, Sacrum ulcer    PCP:    DME: ricardo wound vac, veriflow\" machine at home (supplied by [employer])    Active with any community resources/agencies/skilled home care:  [address] [place] [address]/wound care @ [facility], [phone]      Fax: [phone]    Medication compliance issues: no    Pharmacy/Financial issues that could impact healthcare: no    Transportation at the time of discharge: family

## 2021-08-13 LAB
A/G RATIO: 0.7 (ref 1.1–2.2)
ALBUMIN SERPL-MCNC: 2.9 G/DL (ref 3.4–5)
ALP BLD-CCNC: 122 U/L (ref 40–129)
ALT SERPL-CCNC: 6 U/L (ref 10–40)
ANION GAP SERPL CALCULATED.3IONS-SCNC: 8 MMOL/L (ref 3–16)
AST SERPL-CCNC: 10 U/L (ref 15–37)
BILIRUB SERPL-MCNC: 0.5 MG/DL (ref 0–1)
BUN BLDV-MCNC: 11 MG/DL (ref 7–20)
CALCIUM SERPL-MCNC: 8.5 MG/DL (ref 8.3–10.6)
CHLORIDE BLD-SCNC: 94 MMOL/L (ref 99–110)
CO2: 32 MMOL/L (ref 21–32)
CREAT SERPL-MCNC: 0.7 MG/DL (ref 0.6–1.2)
GFR AFRICAN AMERICAN: >60
GFR NON-AFRICAN AMERICAN: >60
GLOBULIN: 3.9 G/DL
GLUCOSE BLD-MCNC: 142 MG/DL (ref 70–99)
GLUCOSE BLD-MCNC: 167 MG/DL (ref 70–99)
GLUCOSE BLD-MCNC: 176 MG/DL (ref 70–99)
GLUCOSE BLD-MCNC: 203 MG/DL (ref 70–99)
GLUCOSE BLD-MCNC: 225 MG/DL (ref 70–99)
GLUCOSE BLD-MCNC: 237 MG/DL (ref 70–99)
MAGNESIUM: 2 MG/DL (ref 1.8–2.4)
PERFORMED ON: ABNORMAL
PHOSPHORUS: 3.1 MG/DL (ref 2.5–4.9)
POTASSIUM SERPL-SCNC: 4.3 MMOL/L (ref 3.5–5.1)
PRO-BNP: 6845 PG/ML (ref 0–124)
SODIUM BLD-SCNC: 134 MMOL/L (ref 136–145)
TOTAL PROTEIN: 6.8 G/DL (ref 6.4–8.2)

## 2021-08-13 PROCEDURE — 97530 THERAPEUTIC ACTIVITIES: CPT

## 2021-08-13 PROCEDURE — 6370000000 HC RX 637 (ALT 250 FOR IP): Performed by: INTERNAL MEDICINE

## 2021-08-13 PROCEDURE — 83735 ASSAY OF MAGNESIUM: CPT

## 2021-08-13 PROCEDURE — 6370000000 HC RX 637 (ALT 250 FOR IP): Performed by: CLINICAL NURSE SPECIALIST

## 2021-08-13 PROCEDURE — 36415 COLL VENOUS BLD VENIPUNCTURE: CPT

## 2021-08-13 PROCEDURE — 99232 SBSQ HOSP IP/OBS MODERATE 35: CPT | Performed by: CLINICAL NURSE SPECIALIST

## 2021-08-13 PROCEDURE — 94761 N-INVAS EAR/PLS OXIMETRY MLT: CPT

## 2021-08-13 PROCEDURE — 80053 COMPREHEN METABOLIC PANEL: CPT

## 2021-08-13 PROCEDURE — 97116 GAIT TRAINING THERAPY: CPT

## 2021-08-13 PROCEDURE — 6370000000 HC RX 637 (ALT 250 FOR IP): Performed by: PHYSICIAN ASSISTANT

## 2021-08-13 PROCEDURE — 97606 NEG PRS WND THER DME>50 SQCM: CPT

## 2021-08-13 PROCEDURE — 94640 AIRWAY INHALATION TREATMENT: CPT

## 2021-08-13 PROCEDURE — 2060000000 HC ICU INTERMEDIATE R&B

## 2021-08-13 PROCEDURE — 6360000002 HC RX W HCPCS: Performed by: INTERNAL MEDICINE

## 2021-08-13 PROCEDURE — 2580000003 HC RX 258: Performed by: INTERNAL MEDICINE

## 2021-08-13 PROCEDURE — 83880 ASSAY OF NATRIURETIC PEPTIDE: CPT

## 2021-08-13 PROCEDURE — 84100 ASSAY OF PHOSPHORUS: CPT

## 2021-08-13 RX ORDER — SPIRONOLACTONE 25 MG/1
25 TABLET ORAL DAILY
Status: DISCONTINUED | OUTPATIENT
Start: 2021-08-14 | End: 2021-08-17 | Stop reason: HOSPADM

## 2021-08-13 RX ORDER — FUROSEMIDE 40 MG/1
40 TABLET ORAL DAILY
Status: DISCONTINUED | OUTPATIENT
Start: 2021-08-13 | End: 2021-08-14

## 2021-08-13 RX ADMIN — CETIRIZINE HYDROCHLORIDE 10 MG: 10 TABLET, FILM COATED ORAL at 10:20

## 2021-08-13 RX ADMIN — ACETAMINOPHEN 650 MG: 325 TABLET ORAL at 14:45

## 2021-08-13 RX ADMIN — INSULIN GLARGINE 15 UNITS: 100 INJECTION, SOLUTION SUBCUTANEOUS at 21:18

## 2021-08-13 RX ADMIN — MIDODRINE HYDROCHLORIDE 2.5 MG: 5 TABLET ORAL at 10:20

## 2021-08-13 RX ADMIN — ENOXAPARIN SODIUM 40 MG: 40 INJECTION SUBCUTANEOUS at 10:20

## 2021-08-13 RX ADMIN — CYCLOBENZAPRINE 10 MG: 10 TABLET, FILM COATED ORAL at 14:45

## 2021-08-13 RX ADMIN — FLUTICASONE PROPIONATE 2 SPRAY: 50 SPRAY, METERED NASAL at 10:20

## 2021-08-13 RX ADMIN — MIDODRINE HYDROCHLORIDE 2.5 MG: 5 TABLET ORAL at 14:45

## 2021-08-13 RX ADMIN — ROFLUMILAST 500 MCG: 500 TABLET ORAL at 10:20

## 2021-08-13 RX ADMIN — Medication 10 ML: at 10:20

## 2021-08-13 RX ADMIN — INSULIN LISPRO 1 UNITS: 100 INJECTION, SOLUTION INTRAVENOUS; SUBCUTANEOUS at 21:17

## 2021-08-13 RX ADMIN — CYCLOBENZAPRINE 10 MG: 10 TABLET, FILM COATED ORAL at 22:18

## 2021-08-13 RX ADMIN — FUROSEMIDE 40 MG: 40 TABLET ORAL at 14:45

## 2021-08-13 RX ADMIN — DULOXETINE HYDROCHLORIDE 60 MG: 60 CAPSULE, DELAYED RELEASE ORAL at 10:20

## 2021-08-13 RX ADMIN — ATORVASTATIN CALCIUM 40 MG: 40 TABLET, FILM COATED ORAL at 21:28

## 2021-08-13 RX ADMIN — IPRATROPIUM BROMIDE AND ALBUTEROL SULFATE 1 AMPULE: .5; 3 SOLUTION RESPIRATORY (INHALATION) at 11:15

## 2021-08-13 RX ADMIN — IPRATROPIUM BROMIDE AND ALBUTEROL SULFATE 1 AMPULE: .5; 3 SOLUTION RESPIRATORY (INHALATION) at 07:55

## 2021-08-13 RX ADMIN — MORPHINE SULFATE 15 MG: 15 TABLET, FILM COATED, EXTENDED RELEASE ORAL at 10:20

## 2021-08-13 RX ADMIN — INSULIN LISPRO 2 UNITS: 100 INJECTION, SOLUTION INTRAVENOUS; SUBCUTANEOUS at 17:19

## 2021-08-13 RX ADMIN — CLOPIDOGREL BISULFATE 75 MG: 75 TABLET ORAL at 10:20

## 2021-08-13 RX ADMIN — INSULIN LISPRO 1 UNITS: 100 INJECTION, SOLUTION INTRAVENOUS; SUBCUTANEOUS at 10:20

## 2021-08-13 RX ADMIN — IRON SUCROSE 200 MG: 20 INJECTION, SOLUTION INTRAVENOUS at 00:05

## 2021-08-13 RX ADMIN — LATANOPROST 1 DROP: 50 SOLUTION OPHTHALMIC at 21:15

## 2021-08-13 RX ADMIN — ASPIRIN 81 MG: 81 TABLET, COATED ORAL at 10:20

## 2021-08-13 RX ADMIN — MIDODRINE HYDROCHLORIDE 2.5 MG: 5 TABLET ORAL at 17:30

## 2021-08-13 RX ADMIN — SPIRONOLACTONE 12.5 MG: 25 TABLET ORAL at 10:20

## 2021-08-13 RX ADMIN — CHOLECALCIFEROL TAB 125 MCG (5000 UNIT) 5000 UNITS: 125 TAB at 10:20

## 2021-08-13 RX ADMIN — PANTOPRAZOLE SODIUM 40 MG: 40 TABLET, DELAYED RELEASE ORAL at 05:40

## 2021-08-13 RX ADMIN — IPRATROPIUM BROMIDE AND ALBUTEROL SULFATE 1 AMPULE: .5; 3 SOLUTION RESPIRATORY (INHALATION) at 15:55

## 2021-08-13 RX ADMIN — OXYCODONE AND ACETAMINOPHEN 1 TABLET: 10; 325 TABLET ORAL at 00:51

## 2021-08-13 RX ADMIN — IPRATROPIUM BROMIDE AND ALBUTEROL SULFATE 1 AMPULE: .5; 3 SOLUTION RESPIRATORY (INHALATION) at 20:20

## 2021-08-13 RX ADMIN — Medication 10 ML: at 21:15

## 2021-08-13 RX ADMIN — Medication 500 MG: at 10:20

## 2021-08-13 RX ADMIN — PREDNISONE 4 MG: 1 TABLET ORAL at 10:20

## 2021-08-13 RX ADMIN — METOPROLOL TARTRATE 12.5 MG: 25 TABLET, FILM COATED ORAL at 10:20

## 2021-08-13 RX ADMIN — INSULIN LISPRO 1 UNITS: 100 INJECTION, SOLUTION INTRAVENOUS; SUBCUTANEOUS at 12:00

## 2021-08-13 RX ADMIN — METOPROLOL TARTRATE 12.5 MG: 25 TABLET, FILM COATED ORAL at 21:28

## 2021-08-13 RX ADMIN — MORPHINE SULFATE 15 MG: 15 TABLET, FILM COATED, EXTENDED RELEASE ORAL at 22:18

## 2021-08-13 ASSESSMENT — PAIN DESCRIPTION - DESCRIPTORS
DESCRIPTORS: ACHING;CONSTANT;SHARP
DESCRIPTORS: ACHING;CONSTANT;SHARP

## 2021-08-13 ASSESSMENT — PAIN DESCRIPTION - PAIN TYPE
TYPE: CHRONIC PAIN

## 2021-08-13 ASSESSMENT — PAIN SCALES - GENERAL
PAINLEVEL_OUTOF10: 7
PAINLEVEL_OUTOF10: 8
PAINLEVEL_OUTOF10: 7
PAINLEVEL_OUTOF10: 10
PAINLEVEL_OUTOF10: 8
PAINLEVEL_OUTOF10: 7

## 2021-08-13 ASSESSMENT — PAIN DESCRIPTION - LOCATION
LOCATION: BACK

## 2021-08-13 ASSESSMENT — PAIN DESCRIPTION - FREQUENCY
FREQUENCY: CONTINUOUS
FREQUENCY: CONTINUOUS

## 2021-08-13 ASSESSMENT — PAIN DESCRIPTION - ONSET
ONSET: ON-GOING
ONSET: ON-GOING

## 2021-08-13 ASSESSMENT — PAIN DESCRIPTION - DIRECTION: RADIATING_TOWARDS: RIGHT GROIN

## 2021-08-13 ASSESSMENT — PAIN DESCRIPTION - ORIENTATION
ORIENTATION: LOWER
ORIENTATION: LOWER

## 2021-08-13 NOTE — PROGRESS NOTES
Physical Therapy  Facility/Department: [address]  Daily Treatment Note  NAME: Susanna Ardon  : 1950  MRN: [id number]    Date of Service: 2021    Discharge Recommendations:  PT Equipment Recommendations  Equipment Needed: No  Other: Needs met     Susanna Ardon scored a 19/24 on the AM-PAC short mobility form. Current research shows that an AM-PAC score of 18 or greater is typically associated with a discharge to the patient's home setting. Based on the patient's AM-PAC score and their current functional mobility deficits, it is recommended that the patient have 2-3 sessions per week of Physical Therapy at d/c to increase the patient's independence. At this time, this patient demonstrates the endurance and safety to discharge home with 24/ assist and home PT and a follow up treatment frequency of 2-3x/wk. Please see assessment section for further patient specific details. If patient discharges prior to next session this note will serve as a discharge summary. Please see below for the latest assessment towards goals. HOME HEALTH CARE: LEVEL 3 SAFETY  - Initial home health evaluation to occur within 24-48 hours, in patient home   - Therapy evaluations in home within 24-48 hours of discharge; including DME and home safety   - Frontload therapy 5 days, then 3x a week   - Therapy to evaluate if patient has [address] needs for personal care   -  evaluation within 24-48 hours, includes evaluation of resources and insurance to determine [place], [place], LTC, and Medicaid options     Assessment   Body structures, Functions, Activity limitations: Decreased functional mobility ; Decreased endurance;Decreased safe awareness;Decreased balance;Decreased ADL status; Decreased posture  Assessment: Pt demonstrated improved hemodynamic stability this date with activity and her tolerance to ambulation was primarily limited by shortness of breath and back pain.  The patient was able to maintain SpO2 >90% and managed her breathing with standing rest breaks. The patient continues to have difficultying ambulating a full household distances, but the patient will have 24/7 assist at discharge as she plans to go to her daughter's house. Recommend continued skilled PT to safely progress tolerance to activity. Treatment Diagnosis: Decreased tolerance to activity  Prognosis: Good  History: See below  PT Education: Transfer Training;General Safety  Patient Education: Pt verbalized understanding  Barriers to Learning: None  REQUIRES PT FOLLOW UP: Yes  Activity Tolerance  Activity Tolerance: Patient Tolerated treatment well;Patient limited by pain  Activity Tolerance: Pt's tolerance to ambulation was limited by back pain amd mild-moderate SHIN, however the patient's BP was much improved with activity this date. BP semi-reclined in bed 114/56. BP after ambulation 128/63, SpO2 97% on RA. HR 74-87 bpm throughout session. Patient Diagnosis(es): There were no encounter diagnoses.      has a past medical history of Atherosclerosis of native artery of right lower extremity with rest pain ([address] Back pain, Branch retinal vein occlusion, Bronchiectasis with acute exacerbation ([address] Cellulitis and abscess of left leg, Cellulitis of left lower extremity, Closed compression fracture of thoracic vertebra ([address] Closed fracture of facial bone with routine healing, Closed jaw fracture ([address] Community acquired pneumonia of left lower lobe of lung, Compression fracture of L1 lumbar vertebra ([address] COPD (chronic obstructive pulmonary disease) ([address] Fracture of tibial plateau, closed, left, initial encounter, Minimally displaced zone I fracture of sacrum (HCC), MRSA (methicillin resistant staph aureus) culture positive, MRSA (methicillin resistant Staphylococcus aureus), Mucus plugging of bronchi, Osteomyelitis of mandible, Osteoporosis with pathological fracture, Post herpetic neuralgia, Proximal humerus fracture, Rheumatoid arthritis ([facility] Shingles, Sleep apnea, Status post incision and drainage, Temporal arteritis ([facility] Tobacco use, Tracheomalacia, and Vitreous hemorrhage, right eye ([facility] has a past surgical history that includes hernia repair; Mandible fracture surgery; Foot surgery; Elbow surgery; Cataract removal; Tubal ligation; Knee arthroscopy; Kyphosis surgery; laminectomy; Spinal fusion; Septoplasty (05/07/2013); Artery surgery (05/30/2013); Upper gastrointestinal endoscopy (04/08/2014); bronchoscopy; bronchoscopy (N/A, 06/12/2019); Mandible fracture surgery (02/2020); back surgery (08/2020); other surgical history (01/08/2021); Pressure ulcer debridement (N/A, 01/08/2021); Artery Biopsy (Right, 03/01/2021); Coronary artery bypass graft (N/A, 05/03/2021); Mediastinoscopy (N/A, 05/05/2021); Cardiac surgery (05/03/2021); Leg Surgery (Left, 7/13/2021); and IR MIDLINE CATH (7/14/2021). Restrictions  Restrictions/Precautions  Restrictions/Precautions: Fall Risk, General Precautions, Contact Precautions (high fall risk; wound vac)  Position Activity Restriction  Other position/activity restrictions: Oma Whittaker is a 72 y.o. female for shortness of breath. Onset was yesterday. Context includes family reports the patient has had some increasing shortness of breath for a while however yesterday became more severe. Patient does not use oxygen at home. Patient does follow with pulmonary. Family reports they did a chest x-ray and the patient was sent to the ED to be evaluated. Subjective   General  Chart Reviewed: Yes  Family / Caregiver Present: No  Subjective  Subjective: Lower back pain this morning, 8/10. Pain medications requested. RN notified. General Comment  Comments: Pt semi-reclined in bed upon therapist arrival. Agreeable to PT tx.   Pain Screening  Patient Currently in Pain: Yes  Vital Signs  Patient Currently in Pain: Yes          Orientation  Orientation  Overall Orientation Status: Within Normal Limits     Objective   Bed mobility  Supine to Sit: Modified independent (HOB elevated)  Sit to Supine: Independent (HOB flat)  Scooting: Independent     Transfers  Sit to Stand: Stand by assistance;Contact guard assistance (SBA from EOB and toilet with use of grab bar; progressing to CGA for additional 2 trials from toilet with grab bar due to fatigue)  Stand to sit: Stand by assistance (SBA to EOB and toilet (x3 trials) with use of grab bar)     Ambulation  Ambulation?: Yes  Ambulation 1  Surface: level tile  Device: Rolling Walker  Assistance: Contact guard assistance  Quality of Gait: Decreased step height/length, increasingly forward flexed posture with distance, maintains (B) knee flexion, slow daja  Distance: 10 + 8 ft and 8 ft  Comments: Pt required standing rest break at 10 ft with heavy use of UE support on RW to catch her breath, pt then able to ambulate the remaining distance to bathroom without seated rest break. Pt ambulated back to bed after using bathroom but distance was limited due to increased back pain by that time. Balance  Sitting - Static: Good  Sitting - Dynamic: Good;-  Standing - Static: Fair;+  Standing - Dynamic: Fair;+  Comments: Indep for static sitting balance. Supervision for dynamic sitting balance with lower body dressing at EOB. CGA for standing and dynamic standing balance with and without UE support x5 minutes total for ADLs. Other activity  Comment: Pt assisted up to bathroom. SBA for standing pericare. Pt voided bowels (charted).     AM-PAC Score  AM-PAC Inpatient Mobility Raw Score : 19 (08/13/21 1002)  AM-PAC Inpatient T-Scale Score : 45.44 (08/13/21 1002)  Mobility Inpatient CMS 0-100% Score: 41.77 (08/13/21 1002)  Mobility Inpatient CMS G-Code Modifier : CK (08/13/21 1002)          Goals  Short term goals  Time Frame for Short term goals: Before discharge  Short term goal 1: Pt will complete bed mobility Mod I -- goal met 8/13  Short term goal 2: Pt will complete sit<>stand with SBA  Short term goal 3: Pt will ambulate 50 ft with RW and SBA  Patient Goals   Patient goals : Go home, but I may have to go to my daughter's house    Plan    Plan  Times per week: 3-5x  Current Treatment Recommendations: Strengthening, Transfer Training, Balance Training, Endurance Training, Gait Training, Functional Mobility Training, Neuromuscular Re-education, Safety Education & Training, Patient/Caregiver Education & Training, Equipment Evaluation, Education, & procurement, Home Exercise Program  Safety Devices  Type of devices:  All fall risk precautions in place, Call light within reach, Bed alarm in place, Patient at risk for falls, Gait belt, Nurse notified, Left in bed     Therapy Time   Individual Concurrent Group Co-treatment   Time In 0842         Time Out 0935         Minutes 53         Timed Code Treatment Minutes: [address], PT, DPT #[id number]

## 2021-08-13 NOTE — PROGRESS NOTES
[facility]    8/13/2021 11:32 AM        Name: Oma Whittaker . Admitted: 8/11/2021  Primary Care Provider: Yoni Rubio MD (Tel: [phone])      Chief complaint: shortness of breath. Brief History: Patient is a 73 yo female with hx CAD/CABG, PVD, COPD, chronic diastolic heart failure, sleep apnea, DM2, RA. She presented to [facility] with c/o shortness of breath, chest discomfort, orthopnea, BLE edema. On arrival in ER patient was hypoxic with O2 sat 82%. CXR showed pulmonary vascular congestion and bilateral pleural effusion, proBNP [id number]. She was transferred to [facility] for admission for CHF exacerbation and started on IV Lasix. Subjective:  Presently resting in bed. Reports breathing improved but still feels some congestion in chest. Lasix held per cardiology yesterday secondary hyponatremia and NSVT. Denies chest pain, palpitations, abdominal pain, nausea. Discussed PT/OT recs for SNf on Dc, patient declines, states if needed, she can stay with daughter.        Reviewed interval ancillary notes    Current Medications  midodrine (PROAMATINE) tablet 2.5 mg, TID WC  albuterol sulfate  (90 Base) MCG/ACT inhaler 2 puff, Q4H PRN  ipratropium-albuterol (DUONEB) nebulizer solution 1 ampule, Q4H WA  aspirin EC tablet 81 mg, Daily  atorvastatin (LIPITOR) tablet 40 mg, Nightly  calcium elemental (OSCAL) tablet 500 mg, Daily  cetirizine (ZYRTEC) tablet 10 mg, Daily  clopidogrel (PLAVIX) tablet 75 mg, Daily  cyclobenzaprine (FLEXERIL) tablet 10 mg, BID PRN  Roflumilast (DALIRESP) tablet 500 mcg, Daily  docusate sodium (COLACE) capsule 100 mg, BID PRN  DULoxetine (CYMBALTA) extended release capsule 60 mg, Daily  fluticasone (FLONASE) 50 MCG/ACT nasal spray 2 spray, Daily  insulin glargine (LANTUS;BASAGLAR) injection pen 15 Units, Nightly  latanoprost (XALATAN) 0.005 % ophthalmic solution 1 drop, Nightly  metoprolol tartrate (LOPRESSOR) tablet 12.5 mg, BID  pantoprazole (PROTONIX) tablet 40 mg, QAM AC  predniSONE (DELTASONE) tablet 4 mg, Daily  vitamin D3 (CHOLECALCIFEROL) tablet 5,000 Units, Daily  insulin lispro (1 Unit Dial) 0-6 Units, TID WC  insulin lispro (1 Unit Dial) 0-3 Units, Nightly  glucose (GLUTOSE) 40 % oral gel 15 g, PRN  dextrose 50 % IV solution, PRN  glucagon (rDNA) injection 1 mg, PRN  dextrose 5 % solution, PRN  sodium chloride flush 0.9 % injection 5-40 mL, 2 times per day  sodium chloride flush 0.9 % injection 10 mL, PRN  0.9 % sodium chloride infusion, PRN  ondansetron (ZOFRAN-ODT) disintegrating tablet 4 mg, Q8H PRN   Or  ondansetron (ZOFRAN) injection 4 mg, Q6H PRN  polyethylene glycol (GLYCOLAX) packet 17 g, Daily PRN  acetaminophen (TYLENOL) tablet 650 mg, Q6H PRN   Or  acetaminophen (TYLENOL) suppository 650 mg, Q6H PRN  enoxaparin (LOVENOX) injection 40 mg, Daily  [Held by provider] furosemide (LASIX) injection 40 mg, BID  perflutren lipid microspheres (DEFINITY) injection 1.65 mg, ONCE PRN  morphine (MS CONTIN) extended release tablet 15 mg, 2 times per day  oxyCODONE-acetaminophen (PERCOCET)  MG per tablet 1 tablet, Daily PRN  spironolactone (ALDACTONE) tablet 12.5 mg, Daily  iron sucrose (VENOFER) 200 mg in sodium chloride 0.9 % 100 mL IVPB, Q24H        Objective:  /70   Pulse 74   Temp 98.2 °F (36.8 °C) (Oral)   Resp 16   Ht 5' 10\" (1.778 m)   Wt 147 lb 11.2 oz (67 kg)   SpO2 92%   BMI 21.19 kg/m²     Intake/Output Summary (Last 24 hours) at 8/13/2021 1132  Last data filed at 8/13/2021 0005  Gross per 24 hour   Intake 340 ml   Output 600 ml   Net -260 ml      Wt Readings from Last 3 Encounters:   08/13/21 147 lb 11.2 oz (67 kg)   08/10/21 155 lb (70.3 kg)   08/10/21 155 lb (70.3 kg)     General:  Awake, alert, oriented in NAD  Skin:  Warm and dry. No unusual bruising or rash  Neck:  Supple. No JVD appreciated  Chest:  Normal effort.   Crackles in bases, no mellitus ([facility]    Acute on chronic diastolic heart failure due to coronary artery disease (HCC)    Congestive heart failure of unknown etiology ([facility]    Acute respiratory failure with hypoxia (HCC)    Chronic obstructive pulmonary disease ([facility]  Resolved Problems:    * No resolved hospital problems. *       Assessment & Plan:   1. Acute on chronic diastolic heart failure. CXR with pulmonary vascular congestion and bilateral pleural effusions, proBNP 92,137->[phone]. Started on IV Lasix with good diuresis. Reports subjective improvement but not yet at baseline. Lasix held yesterday secondary hypokalemia and NSVT. Cardiology on board. 2. Acute hypoxic respiratory failure. Documented O2 sat 82% on room air at [facility]. Most likely secondary to CHF exacerbation. She does not use O2 at home. O2 has been weaned, presently low 90s on room air. 3. Chest discomfort/CAD. Troponin 0.03, trend flat. In setting CHF. Continue asa and statin. Echo with EF 50%, mild hypokinesis of basal to mid inferior and inferolateral walls which are new compared to echo 4/2021. Await cardio plan. 4. Question of accelerated junctional rhythm on admission EKG. Evaluated by EP, determined to be sinus rhythm. Continue beta blockers, EP has signed off.    5. DM2, controlled. A1c 7.2. Takes Lantus 15 units nightly and sliding scale mealtime insulin at home. Continue Lantus and low dose correction. Continue to follow. 6. COPD. Not in exacerbation, no change in cough or phlegm, no wheezes. Continue roflumilast and duonebs. 7. Rheumatoid arthritis. Takes prednisone 4 mg daily, continue. 8. NSVT. Noted to have 18 beat run NSVT (8/12), associated with palpitations. Potassium was low at 3.2 and replaced, magnesium 1.7 and replaced. No significant arrhythmia overnight or this am. Continue to monitor. BMP and magnesium in am.   9. Dehisced surgical wound left leg/sacral pressure injury stage IV.  Evaluated by Wound Care, has wound vac in place.     Disposition: Patient declines consideration for SNF. Says if necessary she can stay with daughter who works from home. Anticipate home with home care. Diet: ADULT DIET; Regular; 4 carb choices (60 gm/meal); Low Sodium (2 gm); 2000 ml  Adult Oral Nutrition Supplement;  Low Calorie/High Protein Oral Supplement  Code:Full Code  DVT PPX: enoxaparin      LORENZO Torres CNP   8/13/2021 11:32 AM

## 2021-08-13 NOTE — DISCHARGE INSTR - COC
Continuity of Care Form  [facility]    _x_ Daily Visits x 3     _x_Cardiovascular Assessment. Titrate O2 to keep SaO2 greater than 90%    _x_ Daily Weights- Baseline Wt: 147 lb   Call MD if:   3 pound weight gain or loss in one day OR 5 pound weight gain in one week       _x_ Med List attached:   Hold Coreg/Metoprolol if HR less than 45 or patient symptomatic*   Hold ACE/ARB if SBP less than 85 or patient symptomatic*   Do not hold Spironolactone (aldactone) for hypotension/bradycardia   Call MD for questions: [facility]: [phone]    _x_Follow up appointment with cardiology: date/time:  with Gary Lockhart NP             Patient Name: Vernadine Snellen   :  1950  MRN:  [id number]    Admit date:  2021  Discharge date:  2021    Code Status Order: Full Code   Advance Directives:      Admitting Physician:  Liam Persaud MD  PCP: Ashley Muro MD    Discharging Nurse:  [facility] Unit/Room#: 6UV-5416/8177-71  Discharging Unit Phone Number: [phone]    Emergency Contact:   Extended Emergency Contact Information  Primary Emergency Contact: Roslyn Juan [address] Phone: [phone]  Mobile Phone: [phone]  Relation: Child  Secondary Emergency Contact: [place] [address] Phone: [phone]  Mobile Phone: [phone]  Relation: Child    Past Surgical History:  Past Surgical History:   Procedure Laterality Date    ARTERY BIOPSY Right 2021    at [address]  2013    left temporal artery biopsy    BACK SURGERY  2020    BRONCHOSCOPY      BRONCHOSCOPY N/A 2019    BRONCHOSCOPY ALVEOLAR LAVAGE performed by Mia Odonnell MD at [address]  2021    CATARACT REMOVAL      CORONARY ARTERY BYPASS GRAFT N/A 2021    CORONARY ARTERY BYPASS X3 WITH LEFT ATRIAL APPENDAGE CLIP, 5 LEVEL BILATERAL INTERCOSTAL NERVE BLOCK, STERNAL PLATING performed by Emil Desai MD at [address]      IR MIDLINE CATH  7/14/2021    IR MIDLINE CATH 7/14/2021 [address]    KNEE ARTHROSCOPY      left    KYPHOSIS SURGERY      LAMINECTOMY      LEG SURGERY Left 7/13/2021    INCISION AND DEBRIDEMENT LEFT LOWER LEG WOUND WITH POSSIBLE WOUND VAC PLACEMENT performed by Joe Moy MD at [address] [facility]  02/2020    MEDIASTINOSCOPY N/A 05/05/2021    MEDIASTINAL EXPLORATION AND EVACUATION OF HEMATOMA performed [facility] [address] [facility]  01/[facility]2021    sacral wound debridement    PRESSURE ULCER DEBRIDEMENT N/A 01/08/2021    SACRAL WOUND DEBRIDEMENT performed by Virginia Glasgow MD at [address] [facility]  05/07/2013    FESS with balloon    SPINAL FUSION      TUBAL LIGATION      UPPER GASTROINTESTINAL ENDOSCOPY  04/08/2014    Dilitation       Immunization History:   Immunization History   Administered Date(s) Administered    COVID-19, Pfizer, PF, 30mcg/0.3mL 02/11/2021, 03/04/2021    Influenza Virus Vaccine 10/03/2019    Influenza, High Dose (Fluzone 65 yrs and older) 11/22/2017, 11/08/2018, 09/21/2020    PPD Test 12/02/2014    Pneumococcal Conjugate 13-valent ([id number]) 11/26/2013    Pneumococcal Conjugate 7-valent (Prevnar7) 11/26/2013    Pneumococcal Polysaccharide ([id number]) 10/23/2017    Td Vaccine >8yo [facility] 01/01/2007       Active Problems:  Patient Active Problem List   Diagnosis Code    [facility] M06.9    Psoriasis L40.9    GERD (gastroesophageal reflux disease) K21.9    History of tobacco use Z87.891    Hyponatremia E87.1    Uncontrolled diabetes mellitus ([facility] E11.65    Anemia D64.9    Cylindrical bronchiectasis (HCC) J47.9    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    Immunocompromised state ([facility] D84.9    Hypokalemia E87.6    Coronary artery disease I25.10    Bilateral lower extremity edema R60.0    Essential hypertension I10    Lumbar spondylosis M47.816    Mitral valve insufficiency and aortic valve insufficiency I08.0    Mixed hyperlipidemia E78.2    Myopia of both eyes H52.13    Osteoporosis M81.0    Other chronic sinusitis J32.8    Primary open angle glaucoma (POAG) of both eyes, mild stage H40.1131    Primary osteoarthritis of right hip M16.11    Type 2 diabetes mellitus with unspecified diabetic retinopathy without macular edema ([facility]) E11.319    Uncontrolled type 2 diabetes mellitus with diabetic peripheral angiopathy without gangrene, with long-term current use of insulin ([facility]) E11.51, E11.65, Z79.4    Pressure ulcer of coccygeal region, stage 4 ([facility]) L89.154    NSTEMI (non-ST elevated myocardial infarction) ([facility] I21.4    Hx of CABG Z95.1    Mild malnutrition ([facility]) E44.1    Acute on chronic diastolic heart failure due to coronary artery disease ([facility]) I50.33, I25.10    Surgical wound dehiscence, initial encounter (at [facility]) T81.31XA    Congestive heart failure of unknown etiology ([facility]) I50.9    Acute respiratory failure with hypoxia ([facility]) J96.01    Chronic obstructive pulmonary disease ([facility]) J44.9       Isolation/Infection:   Isolation            Contact          Patient Infection Status       Infection Onset Added Last Indicated Last Indicated By Review Planned Expiration Resolved Resolved By    MRSA 07/07/21 07/09/21 07/13/21 Culture, Wound        Resolved    COVID-19 Rule Out 08/10/21 08/10/21 08/10/21 COVID-19, Rapid (Ordered)   08/10/21 Rule-Out Test Resulted    COVID-19 Rule Out 04/23/21 04/23/21 04/23/21 COVID-19, Rapid (Ordered)   04/23/21 Rule-Out Test Resulted    COVID-19 Rule Out 01/04/21 01/04/21 01/04/21 COVID-19 (Ordered)   01/05/21 Rule-Out Test Resulted    COVID-19 Rule Out 06/28/20 06/28/20 06/28/20 COVID-19 (Ordered)   06/29/20 Rule-Out Test Resulted            Nurse Assessment:  Last Vital Signs: /70 Pulse 74   Temp 98.2 °F (36.8 °C) (Oral)   Resp 16   Ht 5' 10\" (1.778 m)   Wt 147 lb 11.2 oz (67 kg)   SpO2 92%   BMI 21.19 kg/m²     Last documented pain score (0-10 scale): Pain Level: 8  Last Weight:   Wt Readings from Last 1 Encounters:   08/13/21 147 lb 11.2 oz (67 kg)     Mental Status:  oriented and alert    IV Access:  - None    Nursing Mobility/ADLs:  Walking   Assisted  Transfer  Assisted  Bathing  Assisted  Dressing  Assisted  Toileting  Assisted  Feeding  Independent  Med Admin  Assisted  Med Delivery   whole    Wound Care Documentation and Therapy:  Negative Pressure Wound Therapy Sacrum Mid (Active)   $ Standard NPWT >50 sq cm PER TX $ Yes 08/11/21 1920   Wound Type Pressure ulcer: Stage IV 08/11/21 1920   Unit Type I 08/11/21 1920   Dressing Type Black foam 08/13/21 0950   Number of pieces used 1 08/11/21 1920   Cycle Continuous 08/11/21 1920   Target Pressure (mmHg) 125 08/13/21 0950   Intensity 3 08/11/21 1920   Irrigation Solution Sodium chloride 0.9% 07/14/21 1357   Instillation Volume  20 mL 07/14/21 1357   Soak Time  10 minutes 07/14/21 1357   Vac Frequency 3 hours 07/14/21 1357   Canister changed? No 08/04/21 1103   Dressing Status Clean;Dry; Intact 08/13/21 0950   Dressing Changed Changed/New 08/11/21 1920   Drainage Amount Small 07/14/21 1357   Drainage Description Serosanguinous 07/14/21 1357   Dressing Change Due 07/16/21 07/14/21 1357   Output (ml) 0 ml 07/14/21 1616   Wound Assessment Granulation tissue; Red 07/14/21 1357   Blanca-wound Assessment Clean;Dry; Intact 08/12/21 1607   Shape round 07/12/21 1256   Odor None 07/14/21 1357   Number of days: 79       Negative Pressure Wound Therapy Leg Left;Medial (Active)   $ Standard NPWT >50 sq cm PER TX $ Yes 08/11/21 1920   Wound Type Dehisced;Surgical 08/11/21 1920   Unit Type KCI 08/11/21 1920   Dressing Type Black foam 08/13/21 0950   Number of pieces used 2 08/11/21 1920   Cycle Continuous 08/11/21 1920   Target Pressure (mmHg) 125 238       Wound 07/07/21 #3, left medial leg, dehisced surgical, full thickness, onset 5/2021 (Active)   Wound Image   08/11/21 1920   Wound Etiology Non-Healing Surgical 08/11/21 1920   Dressing Status Clean;Dry; Intact 08/13/21 0950   Wound Cleansed Cleansed with saline 08/11/21 1920   Dressing/Treatment Triad hydro/zinc oxide-based hydrophilic paste;Negative pressure wound therapy 08/11/21 1920   Offloading for Diabetic Foot Ulcers Other (comment) 07/14/21 1041   Dressing Change Due 08/13/21 08/11/21 1920   Wound Length (cm) 7.1 cm 08/11/21 1920   Wound Width (cm) 2.2 cm 08/11/21 1920   Wound Depth (cm) 2.2 cm 08/11/21 1920   Wound Surface Area (cm^2) 15.62 cm^2 08/11/21 1920   Change in Wound Size % (l*w) -316.53 08/11/21 1920   Wound Volume (cm^3) 34.364 cm^3 08/11/21 1920   Wound Healing % -1733 08/11/21 1920   Post-Procedure Length (cm) 7 cm 08/04/21 1023   Post-Procedure Width (cm) 2.3 cm 08/04/21 1023   Post-Procedure Depth (cm) 1.2 cm 08/04/21 1023   Post-Procedure Surface Area (cm^2) 16.1 cm^2 08/04/21 1023   Post-Procedure Volume (cm^3) 19.32 cm^3 08/04/21 1023   Distance Tunneling (cm) 1 cm 08/04/21 1023   Tunneling Position ___ O'Clock 11 08/04/21 1023   Undermining Starts ___ O'Clock 12 08/04/21 1023   Undermining Ends___ O'Clock 6 08/04/21 1023   Undermining Maxium Distance (cm) 1 08/04/21 1023   Wound Assessment Pale granulation tissue;Pink/red;Slough 08/11/21 1920   Drainage Amount None 08/11/21 1920   Drainage Description Serosanguinous 08/04/21 0920   Odor None 08/04/21 0920   Blanca-wound Assessment Dry/flaky; Intact 08/11/21 1920   Margins Attached edges; Defined edges 07/14/21 1041   Wound Thickness Description not for Pressure Injury Full thickness 08/11/21 1920   Number of days: 37       Wound 07/12/21 Knee Left; Inner purple (Active)   Wound Image   07/12/21 1955   Wound Etiology Deep tissue/Injury 07/12/21 1259   Dressing Status Clean;Dry; Intact 07/14/21 1945   Wound Cleansed Not Cleansed 07/14/21 1041   Dressing/Treatment Pharmaceutical agent (see MAR) 07/14/21 1041   Offloading for Diabetic Foot Ulcers Other (comment) 07/12/21 1259   Wound Length (cm) 0.2 cm 07/12/21 1259   Wound Width (cm) 6 cm 07/12/21 1259   Wound Depth (cm) 0 cm 07/12/21 1259   Wound Surface Area (cm^2) 1.2 cm^2 07/12/21 1259   Wound Volume (cm^3) 0 cm^3 07/12/21 1259   Distance Tunneling (cm) 0 cm 07/12/21 1259   Tunneling Position ___ O'Clock 0 07/12/21 1259   Undermining Starts ___ O'Clock 0 07/12/21 1259   Undermining Ends___ O'Clock 0 07/12/21 1259   Undermining Maxium Distance (cm) 0 07/12/21 1259   Wound Assessment Purple/maroon 07/12/21 1259   Drainage Amount None 07/12/21 1259   Odor None 07/12/21 1259   Blanca-wound Assessment Edematous; Blanchable erythema 07/12/21 1259   Margins Attached edges; Defined edges 07/12/21 1259   Number of days: 31       Wound 07/12/21 Ankle Left; Inner purple (Active)   Wound Image   07/12/21 1259   Wound Etiology Deep tissue/Injury 07/12/21 1259   Dressing Status Clean;Dry; Intact 07/14/21 1945   Wound Cleansed Cleansed with saline 07/12/21 1259   Dressing/Treatment Pharmaceutical agent (see MAR) 07/14/21 1041   Offloading for Diabetic Foot Ulcers Other (comment) 07/12/21 1259   Wound Length (cm) 0.2 cm 07/12/21 1259   Wound Width (cm) 2 cm 07/12/21 1259   Wound Depth (cm) 0 cm 07/12/21 1259   Wound Surface Area (cm^2) 0.4 cm^2 07/12/21 1259   Wound Volume (cm^3) 0 cm^3 07/12/21 1259   Distance Tunneling (cm) 0 cm 07/12/21 1259   Tunneling Position ___ O'Clock 0 07/12/21 1259   Undermining Starts ___ O'Clock 0 07/12/21 1259   Undermining Ends___ O'Clock 0 07/12/21 1259   Undermining Maxium Distance (cm) 0 07/12/21 1259   Wound Assessment Purple/maroon 07/12/21 1259   Drainage Amount None 07/12/21 1259   Odor None 07/12/21 1259   Blanca-wound Assessment Blanchable erythema;Edematous 07/12/21 1259   Margins Attached edges; Defined edges 07/12/21 1259   Number of days: 31 Elimination:  Continence:   · Bowel: Yes  · Bladder: Yes  Urinary Catheter: None   Colostomy/Ileostomy/Ileal Conduit: No       Date of Last BM: NA    Intake/Output Summary (Last 24 hours) at 8/13/2021 1208  Last data filed at 8/13/2021 0005  Gross per 24 hour   Intake 340 ml   Output 600 ml   Net -260 ml     I/O last 3 completed shifts: In: 340 [P.O.:240; IV Piggyback:100]  Out: 1950 [Urine:1950]    Safety Concerns: At Risk for Falls    Impairments/Disabilities:      None    Nutrition Therapy:  Current Nutrition Therapy:   - Oral Diet:  Low calories high protein    Routes of Feeding: Oral  Liquids: Thin Liquids  Daily Fluid Restriction: no  Last Modified Barium Swallow with Video (Video Swallowing Test): not done    Treatments at the Time of Hospital Discharge:   Respiratory Treatments: NA  Oxygen Therapy:  is on oxygen at 2 L/min per nasal cannula. Ventilator:    - No ventilator support    Rehab Therapies: Physical Therapy, Occupational Therapy and Wound care  Weight Bearing Status/Restrictions: No weight bearing restirctions  Other Medical Equipment (for information only, NOT a DME order):  walker  Other Treatments: NA    Patient's personal belongings (please select all that are sent with patient):  None    RN SIGNATURE:  Electronically signed by Joshua Dsouza RN on 8/17/21 at 2:08 PM EDT    CASE MANAGEMENT/SOCIAL WORK SECTION    Inpatient Status Date: ***    Readmission Risk Assessment Score:  Readmission Risk              Risk of Unplanned Readmission:  40           Discharging to Facility/ Agency   · Name:   · Address:  · Phone:  · Fax:    Dialysis Facility (if applicable)   · Name:  · Address:  · Dialysis Schedule:  · Phone:  · Fax:    / signature: {Esignature:[id number]}    PHYSICIAN SECTION    Prognosis: Good    Condition at Discharge: Stable    Rehab Potential (if transferring to Rehab): Good    Recommended Labs or Other Treatments After Discharge: pt./ot/rn/aide.  wound vac care    Physician Certification: I certify the above information and transfer of Irineo Jones  is necessary for the continuing treatment of the diagnosis listed and that she requires Home Care for {GREATER/LESS:[id number]} 30 days.      Update Admission H&P: No change in H&P    PHYSICIAN SIGNATURE:  Electronically signed by LORENZO Cox CNP on 8/17/21 at 10:45 AM EDT

## 2021-08-13 NOTE — CARE COORDINATION
1459   Dressing Type Black foam 08/13/21 1459   Number of pieces used 1 08/13/21 1459   Cycle Continuous 08/13/21 1459   Target Pressure (mmHg) 125 08/13/21 1459   Intensity 3 08/13/21 1459   Canister changed? No 08/13/21 1459   Dressing Status Changed 08/13/21 1459   Dressing Changed Changed/New 08/13/21 1459   Wound Assessment Bleeding 08/13/21 1459   Blanca-wound Assessment Intact 08/13/21 1459   Number of days: 1       Wound 12/18/20 #2, Sacrum, Pressure Injury, Stage 4, Onset 5/2020 (Active)   Wound Image   08/11/21 1920   Wound Etiology Pressure Stage  4 08/13/21 1459   Dressing Status Clean;Dry; Intact 08/13/21 1430   Wound Cleansed Cleansed with saline 08/11/21 1920   Dressing/Treatment Negative pressure wound therapy 08/11/21 1920   Offloading for Diabetic Foot Ulcers Other (comment) 07/13/21 0820   Dressing Change Due 08/13/21 08/11/21 1920   Wound Length (cm) 3 cm 08/13/21 1459   Wound Width (cm) 4 cm 08/13/21 1459   Wound Depth (cm) 0.4 cm 08/13/21 1459   Wound Surface Area (cm^2) 12 cm^2 08/13/21 1459   Change in Wound Size % (l*w) -7900 08/13/21 1459   Wound Volume (cm^3) 4.8 cm^3 08/13/21 1459   Wound Healing % -5233 08/13/21 1459   Post-Procedure Length (cm) 4 cm 08/04/21 1023   Post-Procedure Width (cm) 2 cm 08/04/21 1023   Post-Procedure Depth (cm) 1.5 cm 08/04/21 1023   Post-Procedure Surface Area (cm^2) 8 cm^2 08/04/21 1023   Post-Procedure Volume (cm^3) 12 cm^3 08/04/21 1023   Distance Tunneling (cm) 3 cm 08/04/21 1023   Tunneling Position ___ O'Clock 1 08/04/21 1023   Undermining Starts ___ O'Clock 9 o'clock 08/13/21 1459   Undermining Ends___ O'Clock 7 o'clock 08/13/21 1459   Undermining Maxium Distance (cm) 0 08/04/21 1023   Wound Assessment Granulation tissue;Pink/red 08/13/21 1459   Drainage Amount None 08/13/21 1459   Drainage Description Serosanguinous 08/04/21 0920   Odor None 08/04/21 0920   Blanca-wound Assessment Intact 08/13/21 1459   Margins Defined edges 08/11/21 1920   Wound Thickness Description not for Pressure Injury Full thickness 08/11/21 1920   Number of days: 238       Wound 07/07/21 #3, left medial leg, dehisced surgical, full thickness, onset 5/2021 (Active)   Wound Image   08/11/21 1920   Wound Etiology Non-Healing Surgical 08/13/21 1459   Dressing Status New dressing applied 08/13/21 1459   Wound Cleansed Cleansed with saline 08/13/21 1459   Dressing/Treatment Negative pressure wound therapy 08/13/21 1459   Offloading for Diabetic Foot Ulcers Other (comment) 07/14/21 1041   Dressing Change Due 08/16/21 08/13/21 1459   Wound Length (cm) 7.1 cm 08/13/21 1459   Wound Width (cm) 2.4 cm 08/13/21 1459   Wound Depth (cm) 0.2 cm 08/13/21 1459   Wound Surface Area (cm^2) 17.04 cm^2 08/13/21 1459   Change in Wound Size % (l*w) -354.4 08/13/21 1459   Wound Volume (cm^3) 3.408 cm^3 08/13/21 1459   Wound Healing % -82 08/13/21 1459   Post-Procedure Length (cm) 7 cm 08/04/21 1023   Post-Procedure Width (cm) 2.3 cm 08/04/21 1023   Post-Procedure Depth (cm) 1.2 cm 08/04/21 1023   Post-Procedure Surface Area (cm^2) 16.1 cm^2 08/04/21 1023   Post-Procedure Volume (cm^3) 19.32 cm^3 08/04/21 1023   Distance Tunneling (cm) 1 cm 08/04/21 1023   Tunneling Position ___ O'Clock 11 08/04/21 1023   Undermining Starts ___ O'Clock 12 08/04/21 1023   Undermining Ends___ O'Clock 6 08/04/21 1023   Undermining Maxium Distance (cm) 1 08/04/21 1023   Wound Assessment Bleeding;Granulation tissue 08/13/21 1459   Drainage Amount Scant 08/13/21 1459   Drainage Description Sanguinous 08/13/21 1459   Odor None 08/13/21 1459   Blanca-wound Assessment Intact 08/13/21 1459   Margins Attached edges; Defined edges 08/13/21 1459   Wound Thickness Description not for Pressure Injury Full thickness 08/13/21 1459   Number of days: 37         Wound vac dressing change. Patient tolerated well. Next dressing change Monday 8/16/21    Response to treatment:  Well tolerated by patient.      Pain Assessment:  Severity:  0 / 10  Quality of pain: N/A  Wound Pain Timing/Severity: none  Premedicated: No  Plan:   Plan of Care: Wound 07/07/21 #3, left medial leg, dehisced surgical, full thickness, onset 5/2021-Dressing/Treatment: Negative pressure wound therapy  Wound 12/18/20 #2, Sacrum, Pressure Injury, Stage 4, Onset 5/2020-Dressing/Treatment: Negative pressure wound therapy    Specialty Bed Required : Yes   [x] Low Air Loss   [x] Pressure Redistribution  [] Fluid Immersion  [] Bariatric  [] Total Pressure Relief  [] Other:     Current Diet: ADULT DIET; Regular; 4 carb choices (60 gm/meal); Low Sodium (2 gm); 2000 ml  Adult Oral Nutrition Supplement;  Low Calorie/High Protein Oral Supplement  Dietician consult:  Yes    Discharge Plan:  Placement for patient upon discharge: home with support   Patient appropriate for Outpatient [address] [facility]: Yes    Referrals:  []   [] [address] [facility]  [] Supplies  [] Other    Patient/Caregiver Teaching:  Level of patient/caregiver understanding able to:   [x] Indicates understanding       [] Needs reinforcement  [] Unsuccessful      [] Verbal Understanding  [] Demonstrated understanding       [] No evidence of learning  [] Refused teaching         [] N/A       Electronically signed by  NERY Hummel, RN  Wound/Ostomy Care on 8/13/2021 at 3:08 PM Alert and oriented, no focal deficits, no motor or sensory deficits. Alert and oriented, no focal deficits, no motor or sensory deficits.

## 2021-08-13 NOTE — PLAN OF CARE
Problem: Pain:  Goal: Pain level will decrease  Description: Pain level will decrease  Outcome: Ongoing  Note: Patient with c/o chronic back pain 8/10. Scheduled morphine ER administered per orders - see MAR. Will continue to monitor. Problem: Falls - Risk of:  Goal: Will remain free from falls  Description: Will remain free from falls  Outcome: Ongoing  Note: Patient remains absent from falls at this time. Remains alert and oriented, in bed with call light and belongings in reach. Non-slip footwear on and 2/4 siderails raised. Bed remains in lowest/locked position at all times with alarm activated. Fall precautions in place. Patient encouraged to use call light to request assistance, v/u.  Will continue to monitor. Problem: HEMODYNAMIC STATUS  Goal: Patient has stable vital signs and fluid balance  Outcome: Ongoing  Note: Patient VSS at this time on room air. Will continue to monitor.

## 2021-08-13 NOTE — PROGRESS NOTES
Assessment complete. See flow sheet. Percocet given x1 for c/o pain in sacrum, wound vac running with no issues ,S BP this shift  >100 but less than 120, patient resting at this time with no acute distress or complaints. Patient encouraged to use call light with any needs. Patient states understanding, call light in reach, bed alarm on. Will continue to monitor.

## 2021-08-13 NOTE — PROGRESS NOTES
[facility]   Daily Progress Note      Admit Date:  8/11/2021    HPI:    Ms. Dagoberto Tejeda is a 72year old female with history of CAD/CABG 5/31 urgent, peripheral vascular disease, copd, chronic diastolic heart failure, sleep apnea, DM2, RA on prednisone and chronic MS contin    She is was transferred from [facility] (lack of beds) for shortness of breath, chest discomfort, orthopnea and BLE edema. sats 82%, cxr with pulmonary vascular congestion and bilateral pleural effusion probnp 20K. She was started on IV lasix. She was seen by EP for junctional rhythm determined to be NSR. Iron deficient anemia  VT 8/12 due to low potassium and mag    Subjective:  Patient is being seen for diastolic heart failure. There were no acute overnight cardiac events. Creat 0.7 potassium 4.3 mag 2.0 probnp 20K to 6845  Lasix held due to low potassium and mag. She denies any chest pain but feels like \"my heart is beating hard\". Discussed echo results with her and her daughter, Madalyn Funk on the phone    Objective:   /70   Pulse 74   Temp 98.2 °F (36.8 °C) (Oral)   Resp 16   Ht 5' 10\" (1.778 m)   Wt 147 lb 11.2 oz (67 kg)   SpO2 92%   BMI 21.19 kg/m²       Intake/Output Summary (Last 24 hours) at 8/13/2021 1311  Last data filed at 8/13/2021 0005  Gross per 24 hour   Intake 100 ml   Output --   Net 100 ml          Physical Exam:  General:  Awake, alert, oriented in NAD  Skin:  Warm and dry. No unusual bruising or rash  Neck:  Supple. No JVD or carotid bruit appreciated  Chest:  Normal effort.   Clear to auscultation, no wheezes/rhonchi/rales  Cardiovascular:  RRR, S1/S2, no murmur/gallop/rub  Abdomen:  Soft, nontender, +bowel sounds  Extremities:  No edema  Neurological: No focal deficits  Psychological: Normal mood and affect      Medications:    midodrine  2.5 mg Oral TID WC    ipratropium-albuterol  1 ampule Inhalation Q4H WA    aspirin EC  81 mg Oral Daily    atorvastatin  40 mg Oral Nightly    calcium elemental  500 mg Oral Daily    cetirizine  10 mg Oral Daily    clopidogrel  75 mg Oral Daily    Roflumilast  500 mcg Oral Daily    DULoxetine  60 mg Oral Daily    fluticasone  2 spray Each Nostril Daily    insulin glargine  15 Units Subcutaneous Nightly    latanoprost  1 drop Both Eyes Nightly    metoprolol tartrate  12.5 mg Oral BID    pantoprazole  40 mg Oral QAM AC    predniSONE  4 mg Oral Daily    vitamin D3  5,000 Units Oral Daily    insulin lispro  0-6 Units Subcutaneous TID WC    insulin lispro  0-3 Units Subcutaneous Nightly    sodium chloride flush  5-40 mL Intravenous 2 times per day    enoxaparin  40 mg Subcutaneous Daily    [Held by provider] furosemide  40 mg Intravenous BID    morphine  15 mg Oral 2 times per day    spironolactone  12.5 mg Oral Daily    iron sucrose (VENOFER) iv piggyback 100 mL (Admin over 60 minutes)  200 mg Intravenous Q24H      dextrose      sodium chloride 25 mL (08/12/21 0104)       Lab Data:  CBC:   Recent Labs     08/10/21  1735 08/11/21 0457 08/12/21  0430   WBC 5.5 3.8* 4.4   HGB 9.9* 10.3* 9.3*    250 273     BMP:    Recent Labs     08/11/21  0457 08/12/21  0430 08/13/21  0432   * 137 134*   K 3.9 3.2* 4.3   CO2 29 30 32   BUN 11 12 11   CREATININE 0.7 0.7 0.7     INR:  No results for input(s): INR in the last 72 hours. BNP:    Recent Labs     08/10/21  1735 08/13/21  0432   PROBNP 20,636* 6,845*         Diagnostics:  Echo 8/12/2021  Summary   -Left ventricular cavity size is enlarged. LVESD=5.57 cm   -Overall left ventricular systolic function appears mildly reduced.   -Ejection fraction is visually estimated to be 50%. -There is mild hypokinesis of the basal to mid inferior and inferolateral   walls. -Grade II diastolic dysfunction with elevated LV filling pressures.   -The right ventricle is mildly enlarged.   -Right ventricular systolic function is normal.   -Mitral valve leaflets appear mildly thickened.    -Mitral regurgitation that may be severe. -The aortic valve leaflets appear mildly thickened. -Mild aortic regurgitation.   -Moderate tricuspid regurgitation with a PASP of 57 mmHg.   -Trivial pulmonic regurgitation. Echo 4/24/2021:  Summary   Normal left ventricle systolic function with an estimated ejection fraction   of 55%.   No regional wall motion abnormalities are seen.   Normal left ventricular diastolic filling pressure.   Mild eccentric aortic regurgitation.   Mild mitral and tricuspid regurgitation.   Systolic pulmonary artery pressure (SPAP) is normal and estimated at 27 mmHg   (right atrial pressure 3 mmHg    Assessment:    1. Acute on chronic diastolic heart failure  2. Acute hypoxic respiratory failure  3. CAD status post CABG, on plavix  4. COPD  5.  RA on chronic prednisone  6. Uncontrolled diabetes  7. Iron deficiency anemia    Plan:    1. Will increase aldactone 25 mg daily  2. Will resume lasix 40 mg po daily  3. Will need arb at some point  4. Continue metoprolol 12.5 mg bid  5. Continue midodrine 2.5 mg three times a day  6. CHF nurse following for diet education, fluid restriction and daily weights  7. Continue IV venofer for 4 doses  8. Will ask interventional cardiology to see patient     I spoke with her daughter, Willie Chew on the phone    Discussed with patient who is agreeable with plan of care. Thank you for allowing me to participate in the care of your patient.     LORENZO Centeno - CNS, CNS

## 2021-08-14 LAB
A/G RATIO: 0.8 (ref 1.1–2.2)
ALBUMIN SERPL-MCNC: 3 G/DL (ref 3.4–5)
ALP BLD-CCNC: 117 U/L (ref 40–129)
ALT SERPL-CCNC: 6 U/L (ref 10–40)
ANION GAP SERPL CALCULATED.3IONS-SCNC: 11 MMOL/L (ref 3–16)
AST SERPL-CCNC: 9 U/L (ref 15–37)
BILIRUB SERPL-MCNC: 0.6 MG/DL (ref 0–1)
BUN BLDV-MCNC: 9 MG/DL (ref 7–20)
CALCIUM SERPL-MCNC: 8.8 MG/DL (ref 8.3–10.6)
CHLORIDE BLD-SCNC: 94 MMOL/L (ref 99–110)
CO2: 28 MMOL/L (ref 21–32)
CREAT SERPL-MCNC: 0.7 MG/DL (ref 0.6–1.2)
GFR AFRICAN AMERICAN: >60
GFR NON-AFRICAN AMERICAN: >60
GLOBULIN: 3.7 G/DL
GLUCOSE BLD-MCNC: 126 MG/DL (ref 70–99)
GLUCOSE BLD-MCNC: 131 MG/DL (ref 70–99)
GLUCOSE BLD-MCNC: 169 MG/DL (ref 70–99)
GLUCOSE BLD-MCNC: 172 MG/DL (ref 70–99)
GLUCOSE BLD-MCNC: 240 MG/DL (ref 70–99)
GLUCOSE BLD-MCNC: 304 MG/DL (ref 70–99)
MAGNESIUM: 1.8 MG/DL (ref 1.8–2.4)
PERFORMED ON: ABNORMAL
PHOSPHORUS: 3.2 MG/DL (ref 2.5–4.9)
POTASSIUM SERPL-SCNC: 3.8 MMOL/L (ref 3.5–5.1)
SODIUM BLD-SCNC: 133 MMOL/L (ref 136–145)
TOTAL PROTEIN: 6.7 G/DL (ref 6.4–8.2)

## 2021-08-14 PROCEDURE — 6360000002 HC RX W HCPCS: Performed by: INTERNAL MEDICINE

## 2021-08-14 PROCEDURE — 6370000000 HC RX 637 (ALT 250 FOR IP): Performed by: CLINICAL NURSE SPECIALIST

## 2021-08-14 PROCEDURE — 84100 ASSAY OF PHOSPHORUS: CPT

## 2021-08-14 PROCEDURE — 2060000000 HC ICU INTERMEDIATE R&B

## 2021-08-14 PROCEDURE — 83735 ASSAY OF MAGNESIUM: CPT

## 2021-08-14 PROCEDURE — 6360000002 HC RX W HCPCS: Performed by: NURSE PRACTITIONER

## 2021-08-14 PROCEDURE — 2580000003 HC RX 258: Performed by: INTERNAL MEDICINE

## 2021-08-14 PROCEDURE — 36415 COLL VENOUS BLD VENIPUNCTURE: CPT

## 2021-08-14 PROCEDURE — 6370000000 HC RX 637 (ALT 250 FOR IP): Performed by: NURSE PRACTITIONER

## 2021-08-14 PROCEDURE — 6370000000 HC RX 637 (ALT 250 FOR IP): Performed by: PHYSICIAN ASSISTANT

## 2021-08-14 PROCEDURE — 6370000000 HC RX 637 (ALT 250 FOR IP): Performed by: INTERNAL MEDICINE

## 2021-08-14 PROCEDURE — 94640 AIRWAY INHALATION TREATMENT: CPT

## 2021-08-14 PROCEDURE — 80053 COMPREHEN METABOLIC PANEL: CPT

## 2021-08-14 PROCEDURE — 99233 SBSQ HOSP IP/OBS HIGH 50: CPT | Performed by: NURSE PRACTITIONER

## 2021-08-14 PROCEDURE — 94761 N-INVAS EAR/PLS OXIMETRY MLT: CPT

## 2021-08-14 RX ORDER — POTASSIUM CHLORIDE 7.45 MG/ML
10 INJECTION INTRAVENOUS PRN
Status: DISCONTINUED | OUTPATIENT
Start: 2021-08-14 | End: 2021-08-17 | Stop reason: HOSPADM

## 2021-08-14 RX ORDER — POTASSIUM CHLORIDE 20 MEQ/1
40 TABLET, EXTENDED RELEASE ORAL ONCE
Status: DISCONTINUED | OUTPATIENT
Start: 2021-08-14 | End: 2021-08-17 | Stop reason: HOSPADM

## 2021-08-14 RX ORDER — FUROSEMIDE 10 MG/ML
40 INJECTION INTRAMUSCULAR; INTRAVENOUS ONCE
Status: COMPLETED | OUTPATIENT
Start: 2021-08-14 | End: 2021-08-14

## 2021-08-14 RX ORDER — POTASSIUM CHLORIDE 20 MEQ/1
40 TABLET, EXTENDED RELEASE ORAL PRN
Status: DISCONTINUED | OUTPATIENT
Start: 2021-08-14 | End: 2021-08-17 | Stop reason: HOSPADM

## 2021-08-14 RX ADMIN — DULOXETINE HYDROCHLORIDE 60 MG: 60 CAPSULE, DELAYED RELEASE ORAL at 09:13

## 2021-08-14 RX ADMIN — CHOLECALCIFEROL TAB 125 MCG (5000 UNIT) 5000 UNITS: 125 TAB at 09:13

## 2021-08-14 RX ADMIN — PANTOPRAZOLE SODIUM 40 MG: 40 TABLET, DELAYED RELEASE ORAL at 07:04

## 2021-08-14 RX ADMIN — MORPHINE SULFATE 15 MG: 15 TABLET, FILM COATED, EXTENDED RELEASE ORAL at 21:34

## 2021-08-14 RX ADMIN — FLUTICASONE PROPIONATE 2 SPRAY: 50 SPRAY, METERED NASAL at 09:16

## 2021-08-14 RX ADMIN — Medication 500 MG: at 09:12

## 2021-08-14 RX ADMIN — POTASSIUM CHLORIDE 40 MEQ: 20 TABLET, EXTENDED RELEASE ORAL at 12:48

## 2021-08-14 RX ADMIN — IPRATROPIUM BROMIDE AND ALBUTEROL SULFATE 1 AMPULE: .5; 3 SOLUTION RESPIRATORY (INHALATION) at 15:22

## 2021-08-14 RX ADMIN — FUROSEMIDE 40 MG: 10 INJECTION, SOLUTION INTRAMUSCULAR; INTRAVENOUS at 17:00

## 2021-08-14 RX ADMIN — LATANOPROST 1 DROP: 50 SOLUTION OPHTHALMIC at 21:15

## 2021-08-14 RX ADMIN — IPRATROPIUM BROMIDE AND ALBUTEROL SULFATE 1 AMPULE: .5; 3 SOLUTION RESPIRATORY (INHALATION) at 20:25

## 2021-08-14 RX ADMIN — IPRATROPIUM BROMIDE AND ALBUTEROL SULFATE 1 AMPULE: .5; 3 SOLUTION RESPIRATORY (INHALATION) at 07:55

## 2021-08-14 RX ADMIN — SODIUM CHLORIDE 25 ML: 9 INJECTION, SOLUTION INTRAVENOUS at 01:05

## 2021-08-14 RX ADMIN — MIDODRINE HYDROCHLORIDE 2.5 MG: 5 TABLET ORAL at 09:11

## 2021-08-14 RX ADMIN — Medication 10 ML: at 09:16

## 2021-08-14 RX ADMIN — ASPIRIN 81 MG: 81 TABLET, COATED ORAL at 09:12

## 2021-08-14 RX ADMIN — Medication 10 ML: at 17:00

## 2021-08-14 RX ADMIN — OXYCODONE AND ACETAMINOPHEN 1 TABLET: 10; 325 TABLET ORAL at 14:05

## 2021-08-14 RX ADMIN — CLOPIDOGREL BISULFATE 75 MG: 75 TABLET ORAL at 09:12

## 2021-08-14 RX ADMIN — IPRATROPIUM BROMIDE AND ALBUTEROL SULFATE 1 AMPULE: .5; 3 SOLUTION RESPIRATORY (INHALATION) at 11:36

## 2021-08-14 RX ADMIN — CYCLOBENZAPRINE 10 MG: 10 TABLET, FILM COATED ORAL at 21:33

## 2021-08-14 RX ADMIN — ROFLUMILAST 500 MCG: 500 TABLET ORAL at 09:11

## 2021-08-14 RX ADMIN — MIDODRINE HYDROCHLORIDE 2.5 MG: 5 TABLET ORAL at 17:00

## 2021-08-14 RX ADMIN — INSULIN LISPRO 1 UNITS: 100 INJECTION, SOLUTION INTRAVENOUS; SUBCUTANEOUS at 21:15

## 2021-08-14 RX ADMIN — IRON SUCROSE 200 MG: 20 INJECTION, SOLUTION INTRAVENOUS at 01:05

## 2021-08-14 RX ADMIN — CETIRIZINE HYDROCHLORIDE 10 MG: 10 TABLET, FILM COATED ORAL at 09:12

## 2021-08-14 RX ADMIN — PREDNISONE 4 MG: 1 TABLET ORAL at 09:12

## 2021-08-14 RX ADMIN — INSULIN LISPRO 4 UNITS: 100 INJECTION, SOLUTION INTRAVENOUS; SUBCUTANEOUS at 17:00

## 2021-08-14 RX ADMIN — SPIRONOLACTONE 25 MG: 25 TABLET ORAL at 09:12

## 2021-08-14 RX ADMIN — MIDODRINE HYDROCHLORIDE 2.5 MG: 5 TABLET ORAL at 12:48

## 2021-08-14 RX ADMIN — ENOXAPARIN SODIUM 40 MG: 40 INJECTION SUBCUTANEOUS at 09:11

## 2021-08-14 RX ADMIN — INSULIN LISPRO 1 UNITS: 100 INJECTION, SOLUTION INTRAVENOUS; SUBCUTANEOUS at 12:11

## 2021-08-14 RX ADMIN — METOPROLOL TARTRATE 12.5 MG: 25 TABLET, FILM COATED ORAL at 09:12

## 2021-08-14 RX ADMIN — FUROSEMIDE 40 MG: 40 TABLET ORAL at 09:12

## 2021-08-14 RX ADMIN — INSULIN GLARGINE 15 UNITS: 100 INJECTION, SOLUTION SUBCUTANEOUS at 21:16

## 2021-08-14 RX ADMIN — MORPHINE SULFATE 15 MG: 15 TABLET, FILM COATED, EXTENDED RELEASE ORAL at 09:13

## 2021-08-14 RX ADMIN — ATORVASTATIN CALCIUM 40 MG: 40 TABLET, FILM COATED ORAL at 21:33

## 2021-08-14 ASSESSMENT — PAIN DESCRIPTION - LOCATION
LOCATION: BACK

## 2021-08-14 ASSESSMENT — PAIN DESCRIPTION - PAIN TYPE
TYPE: CHRONIC PAIN

## 2021-08-14 ASSESSMENT — PAIN DESCRIPTION - ORIENTATION
ORIENTATION: LOWER

## 2021-08-14 ASSESSMENT — PAIN DESCRIPTION - ONSET: ONSET: ON-GOING

## 2021-08-14 ASSESSMENT — PAIN SCALES - GENERAL
PAINLEVEL_OUTOF10: 7
PAINLEVEL_OUTOF10: 6
PAINLEVEL_OUTOF10: 0
PAINLEVEL_OUTOF10: 8
PAINLEVEL_OUTOF10: 6
PAINLEVEL_OUTOF10: 7
PAINLEVEL_OUTOF10: 7
PAINLEVEL_OUTOF10: 6

## 2021-08-14 ASSESSMENT — PAIN DESCRIPTION - FREQUENCY: FREQUENCY: CONTINUOUS

## 2021-08-14 ASSESSMENT — PAIN DESCRIPTION - DESCRIPTORS: DESCRIPTORS: ACHING;CONSTANT;SHARP

## 2021-08-14 NOTE — PROGRESS NOTES
[facility]   Cardiology Progress Note     Date: 8/14/2021  Admit Date: 8/11/2021     Reason for consultation: CHF    Chief Complaint: No chief complaint on file. History of Present Illness: History obtained from patient and medical record. Vernadine Snellen is a 72 y.o. female with a past medical history of CAD s/p CABG, PVD, COPD, CHF, DM, RA, and DANIELLE>    Pt transferred to [facility] due to SOB, chest discomfort, orthopnea, and leg swelling. She was noted to be hypoxic with vascular congestion on CXR and elevated BNP of 20K. She was started on IV lasix. Interval Hx: Today, she is being seen for follow up. She feels ok, but is subjectively and notably SOB at rest. Pt remains on room air. She remains in sinus rhythm with stable blood pressure. We discussed resuming IV diuretics. Patient seen and examined. Clinical notes reviewed. Telemetry reviewed. No new complaints today. No major events overnight. Denies having chest pain, palpitations, shortness of breath, orthopnea/PND, cough, or dizziness at the time of this visit. Allergies: Allergies   Allergen Reactions    Atenolol      Cough       Home Meds:  Prior to Visit Medications    Medication Sig Taking? Authorizing Provider   DULoxetine (CYMBALTA) 60 MG extended release capsule Take 60 mg by mouth daily  Historical Provider, MD   DALIRESP 500 MCG tablet TAKE 1 TABLET BY MOUTH DAILY  Mia Odonnell MD   clopidogrel (PLAVIX) 75 MG tablet Take 1 tablet by mouth daily  Xiao Delgado MD   metoprolol tartrate (LOPRESSOR) 25 MG tablet Take 0.5 tablets by mouth 2 times daily Hold this medication for pulse <59 or systolic BP <869  Xiao Delgado MD   predniSONE (DELTASONE) 1 MG tablet Take 4 mg by mouth daily  Historical Provider, MD   insulin glargine (LANTUS) 100 UNIT/ML injection vial Inject 15 Units into the skin nightly  LORENZO Watson CNP   oxyCODONE-acetaminophen (PERCOCET)  MG per tablet Take 1 tablet by mouth daily. Historical Provider, MD   docusate sodium (COLACE) 100 MG capsule Take 100 mg by mouth 2 times daily as needed for Constipation  Historical Provider, MD   gabapentin (NEURONTIN) 300 MG capsule Take 300 mg by mouth 2 times daily. Historical Provider, MD   latanoprost (XALATAN) 0.005 % ophthalmic solution Place 1 drop into both eyes nightly  Historical Provider, MD   Teriparatide, Recombinant, (FORTEO) 600 MCG/2.4ML SOPN injection Inject 20 mcg into the skin daily  Historical Provider, MD   NARCAN 4 MG/0.1ML LIQD nasal spray as needed   Historical Provider, MD   morphine (MS CONTIN) 15 MG extended release tablet 15 mg 2 times daily. Historical Provider, MD   ipratropium (ATROVENT) 0.06 % nasal spray USE 2 SPRAYS BY NASAL ROUTE 2-4 TIMES DAILY  Janneth Blake MD   albuterol sulfate  (90 Base) MCG/ACT inhaler INHALE 2 PUFFS INTO THE LUNGS EVERY 4 HOURS AS NEEDED FOR WHEEZING  Stephanie London MD   vitamin D (CHOLECALCIFEROL) 1000 UNIT TABS tablet Take 5,000 Units by mouth daily   Historical Provider, MD   calcium carbonate (OSCAL) 500 MG TABS tablet Take 500 mg by mouth daily  Historical Provider, MD   atorvastatin (LIPITOR) 40 MG tablet Take 40 mg by mouth  Historical Provider, MD   cyclobenzaprine (FLEXERIL) 10 MG tablet Take 10 mg by mouth 2 times daily as needed   Historical Provider, MD   Insulin Syringe-Needle U-100 31G X 5/16\" 0.5 ML MISC USE 5 TIMES DAILY  Historical Provider, MD   ACCU-CHEK CEDRICK PLUS strip TEST 4 TIMES DAILY  Historical Provider, MD   aspirin EC 81 MG EC tablet Take 1 tablet by mouth daily  Yury Shah MD   insulin lispro (HUMALOG) 100 UNIT/ML injection vial Inject 0-12 Units into the skin 3 times daily (with meals)  Yury Shah MD   Misc.  Devices (ACAPELLA) MISC Take 1 Device by mouth as needed  LORENZO Flores CNP   fluticasone (FLONASE) 50 MCG/ACT nasal spray INHALE 2 SPRAYS IN EACH NOSTRIL DAILY  Janneth Blake MD   cetirizine (ZYRTEC) 10 MG tablet Take 10 mg by mouth daily.    Historical Provider, MD   omeprazole (PRILOSEC) 40 MG capsule Take 40 mg by mouth daily   Historical Provider, MD      Scheduled Meds:   furosemide  40 mg Oral Daily    spironolactone  25 mg Oral Daily    midodrine  2.5 mg Oral TID WC    ipratropium-albuterol  1 ampule Inhalation Q4H WA    aspirin EC  81 mg Oral Daily    atorvastatin  40 mg Oral Nightly    calcium elemental  500 mg Oral Daily    cetirizine  10 mg Oral Daily    clopidogrel  75 mg Oral Daily    Roflumilast  500 mcg Oral Daily    DULoxetine  60 mg Oral Daily    fluticasone  2 spray Each Nostril Daily    insulin glargine  15 Units Subcutaneous Nightly    latanoprost  1 drop Both Eyes Nightly    metoprolol tartrate  12.5 mg Oral BID    pantoprazole  40 mg Oral QAM AC    predniSONE  4 mg Oral Daily    vitamin D3  5,000 Units Oral Daily    insulin lispro  0-6 Units Subcutaneous TID WC    insulin lispro  0-3 Units Subcutaneous Nightly    sodium chloride flush  5-40 mL Intravenous 2 times per day    enoxaparin  40 mg Subcutaneous Daily    morphine  15 mg Oral 2 times per day    iron sucrose (VENOFER) iv piggyback 100 mL (Admin over 60 minutes)  200 mg Intravenous Q24H     Continuous Infusions:   dextrose      sodium chloride Stopped (08/14/21 0413)     PRN Meds:lip balm petroleum free, albuterol sulfate HFA, cyclobenzaprine, docusate sodium, glucose, dextrose, glucagon (rDNA), dextrose, sodium chloride flush, sodium chloride, ondansetron **OR** ondansetron, polyethylene glycol, acetaminophen **OR** acetaminophen, perflutren lipid microspheres, oxyCODONE-acetaminophen     Past Medical History:  Past Medical History:   Diagnosis Date    Atherosclerosis of native artery of right lower extremity with rest pain ([facility] [address] 07/25/2017    Back pain     Branch retinal vein occlusion 07/20/2012    Bronchiectasis with acute exacerbation (HCC)     Cellulitis and abscess of left leg 7/11/2021    Cellulitis of left lower extremity 7/11/2021    S/P vein harvest 05/2021 for CABG    Closed compression fracture of thoracic vertebra ([address] 01/15/2020    Closed fracture of facial bone with routine healing 11/21/2016    Closed jaw fracture ([address] 01/15/2020    Community acquired pneumonia of left lower lobe of lung     Compression fracture of L1 lumbar vertebra ([address] 01/15/2020    COPD (chronic obstructive pulmonary disease) (HCC)     Fracture of tibial plateau, closed, left, initial encounter 12/05/2017    Minimally displaced zone I fracture of sacrum (HCC) 09/02/2020    MRSA (methicillin resistant staph aureus) culture positive 07/2021    MRSA (methicillin resistant Staphylococcus aureus) 07/13/2021    wound    Mucus plugging of bronchi     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 Bone density test last year showed severe osteoporosis. Recently, two broken vertebrae (L1, L2) due to coughing. Diagnosed with tracheomalacia and stated she must cough very hard to clear phlegm. Was coughing due to upper respiratory infections which have been treated. Hx of laminectomy and recent kyphoplasty. Refractured her jawbone which was previously repaired wi    Post herpetic neuralgia     Proximal humerus fracture 10/01/2019    Rheumatoid arthritis ([address]     Shingles 05/2020    Sleep apnea     Status post incision and drainage 07/2021    Left Leg    Temporal arteritis ([address] 07/10/2013    Tobacco use 10/19/2017    Tracheomalacia     Vitreous hemorrhage, right eye ([address] 02/21/2020      Past Surgical History:    has a past surgical history that includes hernia repair; Mandible fracture surgery; Foot surgery; Elbow surgery; Cataract removal; Tubal ligation; Knee arthroscopy; Kyphosis surgery; laminectomy; Spinal fusion; Septoplasty (05/07/2013); Artery surgery (05/30/2013);  Upper gastrointestinal (70.3 kg)   08/10/21 155 lb (70.3 kg)       Intake/Output Summary (Last 24 hours) at 8/14/2021 1220  Last data filed at 8/14/2021 0854  Gross per 24 hour   Intake 1266.24 ml   Output 600 ml   Net 666.24 ml       Telemetry: Personally Reviewed  - Sinus rhythm with occasional PVC  · Constitutional: Cooperative and in no apparent distress, and appears well nourished  · Skin: Warm and pink; no pallor, cyanosis, bruising, or clubbing. OHS incision. Wound vac in place to left leg  · HEENT: Symmetric and normocephalic. PERRL, EOM intact. Conjunctiva pink with clear sclera. Mucus membranes pink and moist. Teeth intact. Thyroid smooth without nodules or goiter. · Cardiovascular: Regular rate and rhythm. S1/S2 present without murmurs, rubs, or gallops. Peripheral pulses 2+, capillary refill < 3 seconds. No elevation of JVP. No peripheral edema  · Respiratory: Respirations symmetric and unlabored. Lungs diminished to auscultation bilaterally, no wheezing, crackles, or rhonchi  · Gastrointestinal: Abdomen soft and round. Bowel sounds normoactive in all quadrants without tenderness or masses. · Musculoskeletal: + Generalized weakness  · Neurologic/Psych: Awake and orientated to person, place and time. Calm affect, appropriate mood    Pertinent labs, diagnostic, device, and imaging results reviewed as a part of this visit    Labs:    BMP:   Recent Labs     08/12/21  0430 08/13/21  0432 08/14/21  0449    134* 133*   K 3.2* 4.3 3.8   CL 96* 94* 94*   CO2 30 32 28   PHOS 2.7 3.1 3.2   BUN 12 11 9   CREATININE 0.7 0.7 0.7   MG 1.70* 2.00 1.80     Estimated Creatinine Clearance: 80 mL/min (based on SCr of 0.7 mg/dL).    CBC:   Recent Labs     08/12/21 0430   WBC 4.4   HGB 9.3*   HCT 28.8*   MCV 82.8        Thyroid: No results found for: TSH, U8RDHQN, U4JUAIJ, THYROIDAB  Lipids:   Lab Results   Component Value Date    CHOL 107 08/11/2021    HDL 34 08/11/2021    HDL 43 07/22/2010    TRIG 97 08/11/2021     LFTS:   Lab  Pressure ulcer of coccygeal region, stage 4 ([address] 12/21/2020    Myopia of both eyes 02/21/2020    Primary open angle glaucoma (POAG) of both eyes, mild stage 02/21/2020    Type 2 diabetes mellitus with unspecified diabetic retinopathy without macular edema ([address] 02/21/2020    Hypokalemia     Tracheobronchomalacia     Immunocompromised state ([address]     Cylindrical bronchiectasis ([address] 12/19/2018    Osteoporosis 09/25/2018    Primary osteoarthritis of right hip 09/25/2018    History of tobacco use 10/19/2017    Hyponatremia 10/19/2017    Uncontrolled diabetes mellitus ([address] 10/19/2017    Anemia 10/19/2017    Psoriasis     GERD (gastroesophageal reflux disease)     Coronary artery disease 07/03/2017    Uncontrolled type 2 diabetes mellitus with diabetic peripheral angiopathy without gangrene, with long-term current use of insulin ([address] 06/28/2017    Mitral valve insufficiency and aortic valve insufficiency 03/02/2017    Bilateral lower extremity edema 11/17/2015    Other chronic sinusitis 06/16/2014    Mixed hyperlipidemia 12/26/2012    Rheumatoid arthritis ([address] 01/19/2012    Essential hypertension 12/22/2011        Assessment and Plan:     1. Acute on Chronic diastolic heart failure (NYHA Class III)  - Appears mildly decompensated   ~ EF 50% per echo  - Continue with metoprolol 12.5 mg BID, spironolactone 25 mg QD  - On PO lasix (Already given this AM); will switch back to IV lasix 40 mg daily  ~ Consider starting ACE/ARB as OP once BP more stable  - Monitor I&Os, daily weights    2. CAD  - S/p CABG x3 with BIMAL ligation (5/10/21); post op hematoma requiring evacuation (5/5/21)  - Stable  - No complaints of angina  - Continue ASA, Plavix, BB, and statin    3. Acute Respiratory Failure   - Improved with diuresis   - SOB at rest today   - Continue diuresis; resume IV lasix   - If SOB does not improve, consider repeat CXR   - Encourage IS and activity    4.  NSVT   - No recurrence   - Continue BB   - Keep K+ >4 and mag >2    5. Mitral Regurgitation   - ? Severe on recent echo; prior echo was mild (4/21)   - Consider repeat echo in few months to assess    6. Hypokalemia   - Stable   - Replace to keep >4 with diuresis    7. Hypotension   - Stable   - On midodrine TID    Multiple medical conditions with risk of decompensation. Discussed with hospitalist    All pertinent information and plan of care discussed with the rounding physician. All questions and concerns were addressed to the patient. Alternatives to my treatment were discussed. I have discussed the above stated plan with patient and the nurse. The patient verbalized understanding and agreed with the plan. Thank you for allowing to us to participate in the care of Vernadine Snellen     The patient was seen for >35 minutes. I reviewed interval history, physical exam, review of data including labs, imaging, development and implementation of treatment plan and coordination of complex care.     LORENZO Deal-CNP  [address]   Office: [phone]

## 2021-08-14 NOTE — PLAN OF CARE
Problem: Pain:  Goal: Pain level will decrease  Description: Pain level will decrease  Outcome: Ongoing     Problem: HEMODYNAMIC STATUS  Goal: Patient has stable vital signs and fluid balance  Outcome: Ongoing     Vitals:    08/14/21 0410   BP: 116/63   Pulse: 75   Resp: 14   Temp: 98.4 °F (36.9 °C)   SpO2: 92%     Pt mostly asleep overnight. VSS at 0410. Pt reports pain down to 6/10 and agrees to notify RN if medication needed. See all flowsheets. Will continue to monitor.

## 2021-08-14 NOTE — PROGRESS NOTES
[facility]    8/14/2021 12:12 PM        Name: Julian Hanley . Admitted: 8/11/2021  Primary Care Provider: Lina Yun MD (Tel: [phone])      Chief complaint: shortness of breath. Brief History: Patient is a 71 yo female with hx CAD/CABG, PVD, COPD, chronic diastolic heart failure, sleep apnea, DM2, RA. She presented to [facility] with c/o shortness of breath, chest discomfort, orthopnea, BLE edema. On arrival in ER patient was hypoxic with O2 sat 82%. CXR showed pulmonary vascular congestion and bilateral pleural effusion, proBNP 48615. She was transferred to [facility] for admission for CHF exacerbation and started on IV Lasix. Subjective:  Presently returning to bed from bedside commode. Reports breathing is improved but still gets winded with minimal activity, even with talking. Denies chest pain, palpitations, abdominal pain, nausea, diarrhea.      Reviewed interval ancillary notes    Current Medications  lip balm petroleum free (PHYTOPLEX) stick, PRN  furosemide (LASIX) tablet 40 mg, Daily  spironolactone (ALDACTONE) tablet 25 mg, Daily  midodrine (PROAMATINE) tablet 2.5 mg, TID WC  albuterol sulfate  (90 Base) MCG/ACT inhaler 2 puff, Q4H PRN  ipratropium-albuterol (DUONEB) nebulizer solution 1 ampule, Q4H WA  aspirin EC tablet 81 mg, Daily  atorvastatin (LIPITOR) tablet 40 mg, Nightly  calcium elemental (OSCAL) tablet 500 mg, Daily  cetirizine (ZYRTEC) tablet 10 mg, Daily  clopidogrel (PLAVIX) tablet 75 mg, Daily  cyclobenzaprine (FLEXERIL) tablet 10 mg, BID PRN  Roflumilast (DALIRESP) tablet 500 mcg, Daily  docusate sodium (COLACE) capsule 100 mg, BID PRN  DULoxetine (CYMBALTA) extended release capsule 60 mg, Daily  fluticasone (FLONASE) 50 MCG/ACT nasal spray 2 spray, Daily  insulin glargine (LANTUS;BASAGLAR) injection pen 15 Units, Nightly  latanoprost (XALATAN) 0.005 % ophthalmic solution 1 drop, Nightly  metoprolol tartrate (LOPRESSOR) tablet 12.5 mg, BID  pantoprazole (PROTONIX) tablet 40 mg, QAM AC  predniSONE (DELTASONE) tablet 4 mg, Daily  vitamin D3 (CHOLECALCIFEROL) tablet 5,000 Units, Daily  insulin lispro (1 Unit Dial) 0-6 Units, TID WC  insulin lispro (1 Unit Dial) 0-3 Units, Nightly  glucose (GLUTOSE) 40 % oral gel 15 g, PRN  dextrose 50 % IV solution, PRN  glucagon (rDNA) injection 1 mg, PRN  dextrose 5 % solution, PRN  sodium chloride flush 0.9 % injection 5-40 mL, 2 times per day  sodium chloride flush 0.9 % injection 10 mL, PRN  0.9 % sodium chloride infusion, PRN  ondansetron (ZOFRAN-ODT) disintegrating tablet 4 mg, Q8H PRN   Or  ondansetron (ZOFRAN) injection 4 mg, Q6H PRN  polyethylene glycol (GLYCOLAX) packet 17 g, Daily PRN  acetaminophen (TYLENOL) tablet 650 mg, Q6H PRN   Or  acetaminophen (TYLENOL) suppository 650 mg, Q6H PRN  enoxaparin (LOVENOX) injection 40 mg, Daily  perflutren lipid microspheres (DEFINITY) injection 1.65 mg, ONCE PRN  morphine (MS CONTIN) extended release tablet 15 mg, 2 times per day  oxyCODONE-acetaminophen (PERCOCET)  MG per tablet 1 tablet, Daily PRN  iron sucrose (VENOFER) 200 mg in sodium chloride 0.9 % 100 mL IVPB, Q24H        Objective:  BP (!) 110/55   Pulse 78   Temp 98.6 °F (37 °C) (Oral)   Resp 16   Ht 5' 10\" (1.778 m)   Wt 147 lb 11.2 oz (67 kg)   SpO2 96%   BMI 21.19 kg/m²     Intake/Output Summary (Last 24 hours) at 8/14/2021 1212  Last data filed at 8/14/2021 0854  Gross per 24 hour   Intake 1266.24 ml   Output 600 ml   Net 666.24 ml      Wt Readings from Last 3 Encounters:   08/14/21 147 lb 11.2 oz (67 kg)   08/10/21 155 lb (70.3 kg)   08/10/21 155 lb (70.3 kg)     General:  Awake, alert, oriented in NAD  Skin:  Warm and dry. No unusual bruising or rash  Neck:  Supple. No JVD or carotid bruit appreciated  Chest:  Normal effort.   Diminished in bases, no wheezes  Cardiovascular:  RRR, normal S1/S2, no murmur/gallop/rub  Abdomen:  Soft, nontender, +bowel sounds  Extremities:  No edema  Neurological: No focal deficits  Psychological: Normal mood and affect      Labs and Tests:  CBC:   Recent Labs     08/12/21 0430   WBC 4.4   HGB 9.3*        BMP:    Recent Labs     08/12/21 0430 08/13/21 0432 08/14/21 0449    134* 133*   K 3.2* 4.3 3.8   CL 96* 94* 94*   CO2 30 32 28   BUN 12 11 9   CREATININE 0.7 0.7 0.7   GLUCOSE 164* 176* 126*     Hepatic:   Recent Labs     08/12/21 0430 08/13/21 0432 08/14/21 0449   AST 9* 10* 9*   ALT 7* 6* 6*   BILITOT 0.6 0.5 0.6   ALKPHOS 125 122 117       Results for Venita Bailey (MRN [id number]) as of 8/14/2021 12:13   Ref. Range 8/13/2021 21:17 8/13/2021 22:36 8/14/2021 01:53 8/14/2021 07:35 8/14/2021 11:23   POC Glucose Latest Ref Range: 70 - 99 mg/dl 237 (H) 225 (H) 172 (H) 131 (H) 169 (H)       CXR 8/10/2021:  Vascular congestion.       Bibasilar pleural effusions and airspace disease, greater on the right, with   progression in the interval, most likely pulmonary edema. Echo 8/12/2021:  Summary   -Left ventricular cavity size is enlarged. LVESD=5.57 cm   -Overall left ventricular systolic function appears mildly reduced.   -Ejection fraction is visually estimated to be 50%. -There is mild hypokinesis of the basal to mid inferior and inferolateral   walls. -Grade II diastolic dysfunction with elevated LV filling pressures.   -The right ventricle is mildly enlarged.   -Right ventricular systolic function is normal.   -Mitral valve leaflets appear mildly thickened. -Mitral regurgitation that may be severe. -The aortic valve leaflets appear mildly thickened. -Mild aortic regurgitation.   -Moderate tricuspid regurgitation with a PASP of 57 mmHg.   -Trivial pulmonic regurgitation.       Problem List  Active Problems:    Uncontrolled diabetes mellitus (HCC)    Acute on chronic diastolic heart failure due to coronary artery disease (HCC)    Congestive heart failure of unknown etiology ([facility] [address]    Acute respiratory failure with hypoxia (HCC)    Chronic obstructive pulmonary disease ([facility] [address]  Resolved Problems:    * No resolved hospital problems. *       Assessment & Plan:   1. Acute on chronic diastolic heart failure. CXR with pulmonary vascular congestion and bilateral pleural effusions, proBNP 45,526->2222. Started on IV Lasix with good diuresis, transitioned to po 8/13. Still with exertional shortness of breath, she needs additional diuresis. Cardiology on board. 2. Acute hypoxic respiratory failure. Documented O2 sat 82% on room air at [facility]. Most likely secondary to CHF exacerbation. She does not use O2 at home. O2 has been weaned, presently low to mid 90s on room air. 3. Chest discomfort/CAD. Troponin 0.03, trend flat. In setting CHF. Continue asa and statin. Echo with EF 50%, mild hypokinesis of basal to mid inferior and inferolateral walls which are new compared to echo 4/2021. She is s/p CABG 4/2021. Cardio on board. 4. Question of accelerated junctional rhythm on admission EKG. Evaluated by EP, determined to be sinus rhythm. Continue beta blockers, EP has signed off.    5. DM2, controlled. A1c 7.2. Takes Lantus 15 units nightly and sliding scale mealtime insulin at home. BG values reasonably controlled. Continue Lantus and low dose correction. Continue to follow. 6. COPD. Not in exacerbation, no change in cough or phlegm, no wheezes. Continue roflumilast and duonebs. 7. Rheumatoid arthritis. Takes prednisone 4 mg daily, continue. 8. NSVT. Noted to have 18 beat run NSVT (8/12), associated with palpitations. Potassium was low at 3.2 and replaced, magnesium 1.7 and replaced. No further significant arrhythmia. Continue to monitor. BMP in am.   9. Dehisced surgical wound left leg/sacral pressure injury stage IV. Evaluated by Wound Care, has wound vac in place. Disposition: Patient declines consideration for SNF.  Says if necessary she can stay with daughter who works from home. Anticipate home with home care. Diet: ADULT DIET; Regular; 4 carb choices (60 gm/meal); Low Sodium (2 gm); 2000 ml  Adult Oral Nutrition Supplement;  Low Calorie/High Protein Oral Supplement  Code:Full Code  DVT PPX: enoxaparin      LORENZO Nails CNP   8/14/2021 12:12 PM

## 2021-08-14 NOTE — PLAN OF CARE
Problem: Pain:  Goal: Pain level will decrease  Description: Pain level will decrease  Outcome: Ongoing     Flexeril given at 2218 with MS Contin per pt request for c/o pain 10/10. See [facility] & all flowsheets. Will continue to monitor.

## 2021-08-14 NOTE — PLAN OF CARE
Problem: Pain:  Goal: Pain level will decrease  Description: Pain level will decrease  Outcome: Ongoing  Goal: Control of acute pain  Description: Control of acute pain  Outcome: Ongoing  Goal: Control of chronic pain  Description: Control of chronic pain  Outcome: Ongoing     Problem: Falls - Risk of:  Goal: Will remain free from falls  Description: Will remain free from falls  Outcome: Ongoing  Goal: Absence of physical injury  Description: Absence of physical injury  Outcome: Ongoing     Problem: Skin Integrity:  Goal: Will show no infection signs and symptoms  Description: Will show no infection signs and symptoms  Outcome: Ongoing  Goal: Absence of new skin breakdown  Description: Absence of new skin breakdown  Outcome: Ongoing     Problem: OXYGENATION/RESPIRATORY FUNCTION  Goal: Patient will maintain patent airway  Outcome: Ongoing  Goal: Patient will achieve/maintain normal respiratory rate/effort  Description: Respiratory rate and effort will be within normal limits for the patient  Outcome: Ongoing     Problem: HEMODYNAMIC STATUS  Goal: Patient has stable vital signs and fluid balance  Outcome: Ongoing     Problem: FLUID AND ELECTROLYTE IMBALANCE  Goal: Fluid and electrolyte balance are achieved/maintained  Outcome: Ongoing     Problem: ACTIVITY INTOLERANCE/IMPAIRED MOBILITY  Goal: Mobility/activity is maintained at optimum level for patient  Outcome: Ongoing     Problem: Nutrition  Goal: Optimal nutrition therapy  Outcome: Ongoing   8/14 days: IV Lasix ordered per EP. Some leakage with wound vac, but still functioning. PRN Percocet at 1405. K replacement protocol ordered. PT turned from side to side.

## 2021-08-15 LAB
ANION GAP SERPL CALCULATED.3IONS-SCNC: 12 MMOL/L (ref 3–16)
BUN BLDV-MCNC: 12 MG/DL (ref 7–20)
CALCIUM SERPL-MCNC: 8.8 MG/DL (ref 8.3–10.6)
CHLORIDE BLD-SCNC: 95 MMOL/L (ref 99–110)
CO2: 28 MMOL/L (ref 21–32)
CREAT SERPL-MCNC: 0.8 MG/DL (ref 0.6–1.2)
GFR AFRICAN AMERICAN: >60
GFR NON-AFRICAN AMERICAN: >60
GLUCOSE BLD-MCNC: 107 MG/DL (ref 70–99)
GLUCOSE BLD-MCNC: 125 MG/DL (ref 70–99)
GLUCOSE BLD-MCNC: 164 MG/DL (ref 70–99)
GLUCOSE BLD-MCNC: 246 MG/DL (ref 70–99)
GLUCOSE BLD-MCNC: 278 MG/DL (ref 70–99)
GLUCOSE BLD-MCNC: 280 MG/DL (ref 70–99)
GLUCOSE BLD-MCNC: 337 MG/DL (ref 70–99)
HCT VFR BLD CALC: 32.6 % (ref 36–48)
HEMOGLOBIN: 10.6 G/DL (ref 12–16)
MCH RBC QN AUTO: 26.9 PG (ref 26–34)
MCHC RBC AUTO-ENTMCNC: 32.5 G/DL (ref 31–36)
MCV RBC AUTO: 82.6 FL (ref 80–100)
PDW BLD-RTO: 16.7 % (ref 12.4–15.4)
PERFORMED ON: ABNORMAL
PLATELET # BLD: 257 K/UL (ref 135–450)
PMV BLD AUTO: 8.2 FL (ref 5–10.5)
POTASSIUM SERPL-SCNC: 4 MMOL/L (ref 3.5–5.1)
PRO-BNP: 9814 PG/ML (ref 0–124)
RBC # BLD: 3.94 M/UL (ref 4–5.2)
SODIUM BLD-SCNC: 135 MMOL/L (ref 136–145)
WBC # BLD: 4 K/UL (ref 4–11)

## 2021-08-15 PROCEDURE — 85027 COMPLETE CBC AUTOMATED: CPT

## 2021-08-15 PROCEDURE — 2060000000 HC ICU INTERMEDIATE R&B

## 2021-08-15 PROCEDURE — 94761 N-INVAS EAR/PLS OXIMETRY MLT: CPT

## 2021-08-15 PROCEDURE — 6360000002 HC RX W HCPCS: Performed by: INTERNAL MEDICINE

## 2021-08-15 PROCEDURE — 6370000000 HC RX 637 (ALT 250 FOR IP): Performed by: INTERNAL MEDICINE

## 2021-08-15 PROCEDURE — 99233 SBSQ HOSP IP/OBS HIGH 50: CPT | Performed by: NURSE PRACTITIONER

## 2021-08-15 PROCEDURE — 94640 AIRWAY INHALATION TREATMENT: CPT

## 2021-08-15 PROCEDURE — 2580000003 HC RX 258: Performed by: INTERNAL MEDICINE

## 2021-08-15 PROCEDURE — 36415 COLL VENOUS BLD VENIPUNCTURE: CPT

## 2021-08-15 PROCEDURE — 6370000000 HC RX 637 (ALT 250 FOR IP): Performed by: PHYSICIAN ASSISTANT

## 2021-08-15 PROCEDURE — 6370000000 HC RX 637 (ALT 250 FOR IP): Performed by: NURSE PRACTITIONER

## 2021-08-15 PROCEDURE — 83880 ASSAY OF NATRIURETIC PEPTIDE: CPT

## 2021-08-15 PROCEDURE — 6370000000 HC RX 637 (ALT 250 FOR IP): Performed by: CLINICAL NURSE SPECIALIST

## 2021-08-15 PROCEDURE — 6360000002 HC RX W HCPCS: Performed by: NURSE PRACTITIONER

## 2021-08-15 PROCEDURE — 80048 BASIC METABOLIC PNL TOTAL CA: CPT

## 2021-08-15 RX ORDER — POTASSIUM CHLORIDE 20 MEQ/1
20 TABLET, EXTENDED RELEASE ORAL
Status: DISCONTINUED | OUTPATIENT
Start: 2021-08-15 | End: 2021-08-17 | Stop reason: HOSPADM

## 2021-08-15 RX ORDER — INSULIN LISPRO 100 [IU]/ML
0-6 INJECTION, SOLUTION INTRAVENOUS; SUBCUTANEOUS NIGHTLY
Status: DISCONTINUED | OUTPATIENT
Start: 2021-08-15 | End: 2021-08-17 | Stop reason: HOSPADM

## 2021-08-15 RX ORDER — INSULIN LISPRO 100 [IU]/ML
0-12 INJECTION, SOLUTION INTRAVENOUS; SUBCUTANEOUS
Status: DISCONTINUED | OUTPATIENT
Start: 2021-08-15 | End: 2021-08-17 | Stop reason: HOSPADM

## 2021-08-15 RX ORDER — FUROSEMIDE 10 MG/ML
20 INJECTION INTRAMUSCULAR; INTRAVENOUS 2 TIMES DAILY
Status: DISCONTINUED | OUTPATIENT
Start: 2021-08-15 | End: 2021-08-17 | Stop reason: HOSPADM

## 2021-08-15 RX ORDER — DOCUSATE SODIUM 100 MG/1
100 CAPSULE, LIQUID FILLED ORAL 2 TIMES DAILY
Status: DISCONTINUED | OUTPATIENT
Start: 2021-08-15 | End: 2021-08-17 | Stop reason: HOSPADM

## 2021-08-15 RX ADMIN — IPRATROPIUM BROMIDE AND ALBUTEROL SULFATE 1 AMPULE: .5; 3 SOLUTION RESPIRATORY (INHALATION) at 08:06

## 2021-08-15 RX ADMIN — IRON SUCROSE 200 MG: 20 INJECTION, SOLUTION INTRAVENOUS at 00:28

## 2021-08-15 RX ADMIN — IPRATROPIUM BROMIDE AND ALBUTEROL SULFATE 1 AMPULE: .5; 3 SOLUTION RESPIRATORY (INHALATION) at 16:21

## 2021-08-15 RX ADMIN — CLOPIDOGREL BISULFATE 75 MG: 75 TABLET ORAL at 10:34

## 2021-08-15 RX ADMIN — ATORVASTATIN CALCIUM 40 MG: 40 TABLET, FILM COATED ORAL at 21:18

## 2021-08-15 RX ADMIN — LATANOPROST 1 DROP: 50 SOLUTION OPHTHALMIC at 21:19

## 2021-08-15 RX ADMIN — MIDODRINE HYDROCHLORIDE 2.5 MG: 5 TABLET ORAL at 10:34

## 2021-08-15 RX ADMIN — INSULIN GLARGINE 15 UNITS: 100 INJECTION, SOLUTION SUBCUTANEOUS at 23:10

## 2021-08-15 RX ADMIN — DOCUSATE SODIUM 100 MG: 100 CAPSULE, LIQUID FILLED ORAL at 21:18

## 2021-08-15 RX ADMIN — MORPHINE SULFATE 15 MG: 15 TABLET, FILM COATED, EXTENDED RELEASE ORAL at 21:17

## 2021-08-15 RX ADMIN — METOPROLOL TARTRATE 12.5 MG: 25 TABLET, FILM COATED ORAL at 10:33

## 2021-08-15 RX ADMIN — Medication 10 ML: at 17:54

## 2021-08-15 RX ADMIN — Medication 10 ML: at 00:24

## 2021-08-15 RX ADMIN — Medication 500 MG: at 10:33

## 2021-08-15 RX ADMIN — PANTOPRAZOLE SODIUM 40 MG: 40 TABLET, DELAYED RELEASE ORAL at 06:47

## 2021-08-15 RX ADMIN — DULOXETINE HYDROCHLORIDE 60 MG: 60 CAPSULE, DELAYED RELEASE ORAL at 10:34

## 2021-08-15 RX ADMIN — ENOXAPARIN SODIUM 40 MG: 40 INJECTION SUBCUTANEOUS at 10:33

## 2021-08-15 RX ADMIN — PREDNISONE 4 MG: 1 TABLET ORAL at 10:34

## 2021-08-15 RX ADMIN — MORPHINE SULFATE 15 MG: 15 TABLET, FILM COATED, EXTENDED RELEASE ORAL at 10:35

## 2021-08-15 RX ADMIN — INSULIN LISPRO 3 UNITS: 100 INJECTION, SOLUTION INTRAVENOUS; SUBCUTANEOUS at 23:09

## 2021-08-15 RX ADMIN — CETIRIZINE HYDROCHLORIDE 10 MG: 10 TABLET, FILM COATED ORAL at 10:35

## 2021-08-15 RX ADMIN — OXYCODONE AND ACETAMINOPHEN 1 TABLET: 10; 325 TABLET ORAL at 14:45

## 2021-08-15 RX ADMIN — INSULIN LISPRO 8 UNITS: 100 INJECTION, SOLUTION INTRAVENOUS; SUBCUTANEOUS at 17:46

## 2021-08-15 RX ADMIN — INSULIN LISPRO 2 UNITS: 100 INJECTION, SOLUTION INTRAVENOUS; SUBCUTANEOUS at 12:25

## 2021-08-15 RX ADMIN — ROFLUMILAST 500 MCG: 500 TABLET ORAL at 10:43

## 2021-08-15 RX ADMIN — POTASSIUM CHLORIDE 20 MEQ: 20 TABLET, EXTENDED RELEASE ORAL at 10:33

## 2021-08-15 RX ADMIN — MIDODRINE HYDROCHLORIDE 2.5 MG: 5 TABLET ORAL at 17:47

## 2021-08-15 RX ADMIN — IPRATROPIUM BROMIDE AND ALBUTEROL SULFATE 1 AMPULE: .5; 3 SOLUTION RESPIRATORY (INHALATION) at 11:52

## 2021-08-15 RX ADMIN — ASPIRIN 81 MG: 81 TABLET, COATED ORAL at 10:34

## 2021-08-15 RX ADMIN — SODIUM CHLORIDE 25 ML: 9 INJECTION, SOLUTION INTRAVENOUS at 00:27

## 2021-08-15 RX ADMIN — IPRATROPIUM BROMIDE AND ALBUTEROL SULFATE 1 AMPULE: .5; 3 SOLUTION RESPIRATORY (INHALATION) at 19:19

## 2021-08-15 RX ADMIN — Medication 10 ML: at 06:47

## 2021-08-15 RX ADMIN — Medication 10 ML: at 21:33

## 2021-08-15 RX ADMIN — Medication 10 ML: at 10:39

## 2021-08-15 RX ADMIN — SPIRONOLACTONE 25 MG: 25 TABLET ORAL at 10:34

## 2021-08-15 RX ADMIN — FUROSEMIDE 20 MG: 10 INJECTION, SOLUTION INTRAMUSCULAR; INTRAVENOUS at 10:32

## 2021-08-15 RX ADMIN — MIDODRINE HYDROCHLORIDE 2.5 MG: 5 TABLET ORAL at 12:30

## 2021-08-15 RX ADMIN — FUROSEMIDE 20 MG: 10 INJECTION, SOLUTION INTRAMUSCULAR; INTRAVENOUS at 17:54

## 2021-08-15 RX ADMIN — CHOLECALCIFEROL TAB 125 MCG (5000 UNIT) 5000 UNITS: 125 TAB at 10:34

## 2021-08-15 RX ADMIN — FLUTICASONE PROPIONATE 2 SPRAY: 50 SPRAY, METERED NASAL at 10:32

## 2021-08-15 ASSESSMENT — PAIN DESCRIPTION - DESCRIPTORS: DESCRIPTORS: CONSTANT;ACHING

## 2021-08-15 ASSESSMENT — PAIN DESCRIPTION - FREQUENCY: FREQUENCY: CONTINUOUS

## 2021-08-15 ASSESSMENT — PAIN DESCRIPTION - PROGRESSION
CLINICAL_PROGRESSION: NOT CHANGED
CLINICAL_PROGRESSION: NOT CHANGED

## 2021-08-15 ASSESSMENT — PAIN DESCRIPTION - ORIENTATION
ORIENTATION: LOWER

## 2021-08-15 ASSESSMENT — PAIN DESCRIPTION - PAIN TYPE
TYPE: CHRONIC PAIN

## 2021-08-15 ASSESSMENT — PAIN DESCRIPTION - LOCATION
LOCATION: BACK

## 2021-08-15 ASSESSMENT — PAIN SCALES - GENERAL
PAINLEVEL_OUTOF10: 7
PAINLEVEL_OUTOF10: 6
PAINLEVEL_OUTOF10: 6
PAINLEVEL_OUTOF10: 8

## 2021-08-15 ASSESSMENT — PAIN DESCRIPTION - ONSET: ONSET: ON-GOING

## 2021-08-15 NOTE — PLAN OF CARE
Problem: HEMODYNAMIC STATUS  Goal: Patient has stable vital signs and fluid balance  Outcome: Ongoing     Problem: OXYGENATION/RESPIRATORY FUNCTION  Goal: Patient will maintain patent airway  Outcome: Ongoing     Vitals:    08/14/21 2352   BP: (!) 95/49   Pulse: 77   Resp: 18   Temp: 98.8 °F (37.1 °C)   SpO2: 93%     VSS. Pt mostly asleep overnight with oxygenation saturations wnl on RA. See all flowsheets. Will continue to monitor.

## 2021-08-15 NOTE — PROGRESS NOTES
[facility]   Cardiology Progress Note     Date: 8/15/2021  Admit Date: 8/11/2021     Reason for consultation: CHF    Chief Complaint: Weakness, SOB    History of Present Illness: History obtained from patient and medical record. Tucker Angeles is a 72 y.o. female with a past medical history of CAD s/p CABG, PVD, COPD, CHF, DM, RA, and DANIELLE>    Pt transferred to [facility] due to SOB, chest discomfort, orthopnea, and leg swelling. She was noted to be hypoxic with vascular congestion on CXR and elevated BNP of 20K. She was started on IV lasix. Interval Hx: Today, she is being seen for CHF follow up. She is doing better today. Her weight is down 2 lbs and she feels the IV lasix helped her SOB (noticeably less SOB at rest and with conversation). She remains hemodynamically stable in sinus rhythm. We discussed need for further diuresis before d/c    Patient seen and examined. Clinical notes reviewed. Telemetry reviewed. No new complaints today. No major events overnight. Denies having chest pain, palpitations, shortness of breath, orthopnea/PND, cough, or dizziness at the time of this visit. Allergies: Allergies   Allergen Reactions    Atenolol      Cough       Home Meds:  Prior to Visit Medications    Medication Sig Taking?  Authorizing Provider   DULoxetine (CYMBALTA) 60 MG extended release capsule Take 60 mg by mouth daily  Historical Provider, MD   DALIRESP 500 MCG tablet TAKE 1 TABLET BY MOUTH DAILY  Jose Gibbons MD   clopidogrel (PLAVIX) 75 MG tablet Take 1 tablet by mouth daily  Mary Alice Willoughby MD   metoprolol tartrate (LOPRESSOR) 25 MG tablet Take 0.5 tablets by mouth 2 times daily Hold this medication for pulse <53 or systolic BP <515  Mary Alice Willoughby MD   predniSONE (DELTASONE) 1 MG tablet Take 4 mg by mouth daily  Historical Provider, MD   insulin glargine (LANTUS) 100 UNIT/ML injection vial Inject 15 Units into the skin jesusly  LORENZO Song - VERNON   oxyCODONE-acetaminophen (PERCOCET)  MG per tablet Take 1 tablet by mouth daily. Historical Provider, MD   docusate sodium (COLACE) 100 MG capsule Take 100 mg by mouth 2 times daily as needed for Constipation  Historical Provider, MD   gabapentin (NEURONTIN) 300 MG capsule Take 300 mg by mouth 2 times daily. Historical Provider, MD   latanoprost (XALATAN) 0.005 % ophthalmic solution Place 1 drop into both eyes nightly  Historical Provider, MD   Teriparatide, Recombinant, (FORTEO) 600 MCG/2.4ML SOPN injection Inject 20 mcg into the skin daily  Historical Provider, MD   NARCAN 4 MG/0.1ML LIQD nasal spray as needed   Historical Provider, MD   morphine (MS CONTIN) 15 MG extended release tablet 15 mg 2 times daily. Historical Provider, MD   ipratropium (ATROVENT) 0.06 % nasal spray USE 2 SPRAYS BY NASAL ROUTE 2-4 TIMES DAILY  Heri Aaron MD   albuterol sulfate  (90 Base) MCG/ACT inhaler INHALE 2 PUFFS INTO THE LUNGS EVERY 4 HOURS AS NEEDED FOR WHEEZING  Fabiola Gupta MD   vitamin D (CHOLECALCIFEROL) 1000 UNIT TABS tablet Take 5,000 Units by mouth daily   Historical Provider, MD   calcium carbonate (OSCAL) 500 MG TABS tablet Take 500 mg by mouth daily  Historical Provider, MD   atorvastatin (LIPITOR) 40 MG tablet Take 40 mg by mouth  Historical Provider, MD   cyclobenzaprine (FLEXERIL) 10 MG tablet Take 10 mg by mouth 2 times daily as needed   Historical Provider, MD   Insulin Syringe-Needle U-100 31G X 5/16\" 0.5 ML MISC USE 5 TIMES DAILY  Historical Provider, MD   ACCU-CHEK CEDRICK PLUS strip TEST 4 TIMES DAILY  Historical Provider, MD   aspirin EC 81 MG EC tablet Take 1 tablet by mouth daily  Marge Hale MD   insulin lispro (HUMALOG) 100 UNIT/ML injection vial Inject 0-12 Units into the skin 3 times daily (with meals)  Marge Hale MD   Misc.  Devices (ACAPELLA) MISC Take 1 Device by mouth as needed  LORENZO Price - CNP   fluticasone (FLONASE) 50 MCG/ACT nasal spray INHALE 2 SPRAYS IN EACH NOSTRIL DAILY  Barrie Phillips Gabriella Borden MD   cetirizine (ZYRTEC) 10 MG tablet Take 10 mg by mouth daily.     Historical Provider, MD   omeprazole (PRILOSEC) 40 MG capsule Take 40 mg by mouth daily   Historical Provider, MD      Scheduled Meds:   potassium chloride  20 mEq Oral Daily with breakfast    potassium chloride  40 mEq Oral Once    spironolactone  25 mg Oral Daily    midodrine  2.5 mg Oral TID WC    ipratropium-albuterol  1 ampule Inhalation Q4H WA    aspirin EC  81 mg Oral Daily    atorvastatin  40 mg Oral Nightly    calcium elemental  500 mg Oral Daily    cetirizine  10 mg Oral Daily    clopidogrel  75 mg Oral Daily    Roflumilast  500 mcg Oral Daily    DULoxetine  60 mg Oral Daily    fluticasone  2 spray Each Nostril Daily    insulin glargine  15 Units Subcutaneous Nightly    latanoprost  1 drop Both Eyes Nightly    metoprolol tartrate  12.5 mg Oral BID    pantoprazole  40 mg Oral QAM AC    predniSONE  4 mg Oral Daily    vitamin D3  5,000 Units Oral Daily    insulin lispro  0-6 Units Subcutaneous TID WC    insulin lispro  0-3 Units Subcutaneous Nightly    sodium chloride flush  5-40 mL Intravenous 2 times per day    enoxaparin  40 mg Subcutaneous Daily    morphine  15 mg Oral 2 times per day     Continuous Infusions:   dextrose      sodium chloride Stopped (08/15/21 0404)     PRN Meds:lip balm petroleum free, potassium chloride **OR** potassium alternative oral replacement **OR** potassium chloride, albuterol sulfate HFA, cyclobenzaprine, docusate sodium, glucose, dextrose, glucagon (rDNA), dextrose, sodium chloride flush, sodium chloride, ondansetron **OR** ondansetron, polyethylene glycol, acetaminophen **OR** acetaminophen, perflutren lipid microspheres, oxyCODONE-acetaminophen     Past Medical History:  Past Medical History:   Diagnosis Date    Atherosclerosis of native artery of right lower extremity with rest pain ([facility] [address] 07/25/2017    Back pain     Branch retinal vein occlusion 07/20/2012    Bronchiectasis with acute exacerbation (HCC)     Cellulitis and abscess of left leg 7/11/2021    Cellulitis of left lower extremity 7/11/2021    S/P vein harvest 05/2021 for CABG    Closed compression fracture of thoracic vertebra ([address] 01/15/2020    Closed fracture of facial bone with routine healing 11/21/2016    Closed jaw fracture ([address] 01/15/2020    Community acquired pneumonia of left lower lobe of lung     Compression fracture of L1 lumbar vertebra ([address] 01/15/2020    COPD (chronic obstructive pulmonary disease) (HCC)     Fracture of tibial plateau, closed, left, initial encounter 12/05/2017    Minimally displaced zone I fracture of sacrum (HCC) 09/02/2020    MRSA (methicillin resistant staph aureus) culture positive 07/2021    MRSA (methicillin resistant Staphylococcus aureus) 07/13/2021    wound    Mucus plugging of bronchi     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 Bone density test last year showed severe osteoporosis. Recently, two broken vertebrae (L1, L2) due to coughing. Diagnosed with tracheomalacia and stated she must cough very hard to clear phlegm. Was coughing due to upper respiratory infections which have been treated. Hx of laminectomy and recent kyphoplasty. Refractured her jawbone which was previously repaired wi    Post herpetic neuralgia     Proximal humerus fracture 10/01/2019    Rheumatoid arthritis ([address]     Shingles 05/2020    Sleep apnea     Status post incision and drainage 07/2021    Left Leg    Temporal arteritis ([address] 07/10/2013    Tobacco use 10/19/2017    Tracheomalacia     Vitreous hemorrhage, right eye ([address] 02/21/2020      Past Surgical History:    has a past surgical history that includes hernia repair; Mandible fracture surgery; Foot surgery; Elbow surgery;  Cataract removal; Tubal ligation; Knee arthroscopy; Kyphosis surgery; laminectomy; Spinal fusion; Septoplasty (05/07/2013); Artery surgery (05/30/2013); Upper gastrointestinal endoscopy (04/08/2014); bronchoscopy; bronchoscopy (N/A, 06/12/2019); Mandible fracture surgery (02/2020); back surgery (08/2020); other surgical history (01/08/2021); Pressure ulcer debridement (N/A, 01/08/2021); Artery Biopsy (Right, 03/01/2021); Coronary artery bypass graft (N/A, 05/03/2021); Mediastinoscopy (N/A, 05/05/2021); Cardiac surgery (05/03/2021); Leg Surgery (Left, 7/13/2021); and IR MIDLINE CATH (7/14/2021). Social History:  Reviewed. reports that she quit smoking about 30 years ago. Her smoking use included cigarettes. She has a 20.00 pack-year smoking history. She has never used smokeless tobacco. She reports previous alcohol use. She reports that she does not use drugs. Family History:  Reviewed. family history includes Asthma in an other family member; Diabetes in her brother, mother, and sister; Heart Disease in her brother and brother; Hypertension in her mother. Review of Systems:  · Constitutional: Negative for fever, night sweats, chills, weight changes, or weakness  · Skin: Negative for rash, dry skin, pruritus, bruising, bleeding, blood clots, or changes in skin pigment  · HEENT: Negative for vision changes, ringing in the ears, sore throat, dysphagia, or swollen lymph nodes  · Respiratory: Positive for SOB  · Cardiovascular: Reviewed in HPI  · Gastrointestinal: Negative for abdominal pain, N/V/D, constipation, or black/tarry stools  · Genito-Urinary: Negative for dysuria, incontinence, urgency, or hematuria  · Musculoskeletal: Positive for weakness. Negative for joint swelling, muscle pain, or injuries  · Neurological/Psych: Negative for confusion, seizures, headaches, balance issues or TIA-like symptoms.  No anxiety, depression, or insomnia    Physical Examination:  Vitals:    08/15/21 0806   BP:    Pulse:    Resp: 18   Temp:    SpO2: 94% In: 182.1 [I.V.:82.1]  Out: 1550    Wt Readings from Last 3 Encounters:   08/15/21 145 lb 8 oz (66 kg)   08/10/21 155 lb (70.3 kg)   08/10/21 155 lb (70.3 kg)       Intake/Output Summary (Last 24 hours) at 8/15/2021 0955  Last data filed at 8/15/2021 7157  Gross per 24 hour   Intake 422.14 ml   Output 2550 ml   Net -2127.86 ml       Telemetry: Personally Reviewed  - Sinus rhythm with rare PVC  · Constitutional: Cooperative and in no apparent distress, and appears well nourished  · Skin: Warm and pink; no pallor, cyanosis, bruising, or clubbing. OHS incision. Wound vac in place to left leg  · HEENT: Symmetric and normocephalic. PERRL, EOM intact. Conjunctiva pink with clear sclera. Mucus membranes pink and moist. Teeth intact. Thyroid smooth without nodules or goiter. · Cardiovascular: Regular rate and rhythm. S1/S2 present without murmurs, rubs, or gallops. Peripheral pulses 2+, capillary refill < 3 seconds. No elevation of JVP. No peripheral edema  · Respiratory: Respirations symmetric and unlabored. Lungs diminished to auscultation bilaterally, no wheezing, crackles, or rhonchi  · Gastrointestinal: Abdomen soft and round. Bowel sounds normoactive in all quadrants without tenderness or masses. · Musculoskeletal: + Generalized weakness  · Neurologic/Psych: Awake and orientated to person, place and time. Calm affect, appropriate mood    Pertinent labs, diagnostic, device, and imaging results reviewed as a part of this visit    Labs:    BMP:   Recent Labs     08/13/21  0432 08/14/21  0449 08/15/21  0534   * 133* 135*   K 4.3 3.8 4.0   CL 94* 94* 95*   CO2 32 28 28   PHOS 3.1 3.2  --    BUN 11 9 12   CREATININE 0.7 0.7 0.8   MG 2.00 1.80  --      Estimated Creatinine Clearance: 69 mL/min (based on SCr of 0.8 mg/dL).    CBC:   Recent Labs     08/15/21  0533   WBC 4.0   HGB 10.6*   HCT 32.6*   MCV 82.6        Thyroid: No results found for: TSH, X5OUYED, O4BGUFQ, THYROIDAB  Lipids:   Lab Results Component Value Date    CHOL 107 2021    HDL 34 2021    HDL 43 2010    TRIG 97 2021     LFTS:   Lab Results   Component Value Date    ALT 6 2021    AST 9 2021    ALKPHOS 117 2021    PROT 6.7 2021    PROT 7.1 2013    AGRATIO 0.8 2021    BILITOT 0.6 2021     Cardiac Enzymes:   Lab Results   Component Value Date    TROPONINI 0.02 2021    TROPONINI 0.03 2021    TROPONINI 0.03 08/10/2021     Coags:   Lab Results   Component Value Date    PROTIME 12.4 2021    INR 1.09 2021       EC/10/21  NSR, rate 83    ECHO: 21   -Left ventricular cavity size is enlarged. LVESD=5.57 cm   -Overall left ventricular systolic function appears mildly reduced. Ejection fraction is visually estimated to be 50%. -There is mild hypokinesis of the basal to mid inferior and inferolateral walls. -Grade II diastolic dysfunction with elevated LV filling pressures. The right ventricle is mildly enlarged.   -Right ventricular systolic function is normal. Mitral valve leaflets appear mildly thickened. -Mitral regurgitation that may be severe. The aortic valve leaflets appear mildly thickened. -Mild aortic regurgitation. Moderate tricuspid regurgitation with a PASP of 57 mmHg. Trivial pulmonic regurgitation. Stress Test: None    CXR: 8/10/21  Vascular congestion.       Bibasilar pleural effusions and airspace disease, greater on the right, with   progression in the interval, most likely pulmonary edema.      Problem List:   Patient Active Problem List    Diagnosis Date Noted    Acute respiratory failure with hypoxia (HCC)     Chronic obstructive pulmonary disease (HCC)     Congestive heart failure of unknown etiology ([address] 2021    Mild malnutrition ([address] 2021    Surgical wound dehiscence, initial encounter (at [facility]G harvest site) 2021    Acute on chronic diastolic heart failure due to coronary artery disease ([address]     Hx of CABG 07/11/2021    NSTEMI (non-ST elevated myocardial infarction) ([address] 04/23/2021    Lumbar spondylosis 12/21/2020    Pressure ulcer of coccygeal region, stage 4 ([address] 12/21/2020    Myopia of both eyes 02/21/2020    Primary open angle glaucoma (POAG) of both eyes, mild stage 02/21/2020    Type 2 diabetes mellitus with unspecified diabetic retinopathy without macular edema ([address] 02/21/2020    Hypokalemia     Tracheobronchomalacia     Immunocompromised state ([address]     Cylindrical bronchiectasis ([address] 12/19/2018    Osteoporosis 09/25/2018    Primary osteoarthritis of right hip 09/25/2018    History of tobacco use 10/19/2017    Hyponatremia 10/19/2017    Uncontrolled diabetes mellitus ([address] 10/19/2017    Anemia 10/19/2017    Psoriasis     GERD (gastroesophageal reflux disease)     Coronary artery disease 07/03/2017    Uncontrolled type 2 diabetes mellitus with diabetic peripheral angiopathy without gangrene, with long-term current use of insulin ([address] 06/28/2017    Mitral valve insufficiency and aortic valve insufficiency 03/02/2017    Bilateral lower extremity edema 11/17/2015    Other chronic sinusitis 06/16/2014    Mixed hyperlipidemia 12/26/2012    Rheumatoid arthritis ([address] 01/19/2012    Essential hypertension 12/22/2011        Assessment and Plan:     1. Acute on Chronic diastolic heart failure (NYHA Class III)  - Appears mildly decompensated   ~ EF 50% per echo   ~ Remains SOB with worsening BNP  - Continue with metoprolol 12.5 mg BID, spironolactone 25 mg QD  - Continue IV lasix (will do 20 mg BID since her BP drops following dose of 40)  ~ Consider starting ACE/ARB as OP once BP more stable  - Monitor I&Os, daily weights    2. CAD  - S/p CABG x3 with BIMAL ligation (5/10/21); post op hematoma requiring evacuation (5/5/21)  - Stable  - No complaints of angina  - Continue ASA, Plavix, BB, and statin    3.  Acute Respiratory Failure   - Improved with lasix dose yesterday   - Continue IV lasix   - Encourage IS and activity    4. NSVT   - No recurrence   - Continue BB   - Keep K+ >4 and mag >2    5. Mitral Regurgitation   - ? Severe on recent echo; prior echo was mild (4/21)   - Consider repeat echo or ABHISHEK to assess ? 6. Hypokalemia   - Stable   - Replace to keep >4 with diuresis    7. Hypotension   - Stable   - On midodrine TID    Multiple medical conditions with risk of decompensation. Discussed with hospitalist    All pertinent information and plan of care discussed with the rounding physician. All questions and concerns were addressed to the patient. Alternatives to my treatment were discussed. I have discussed the above stated plan with patient and the nurse. The patient verbalized understanding and agreed with the plan. Thank you for allowing to us to participate in the care of Gulshan Yaron     The patient was seen for >35 minutes. I reviewed interval history, physical exam, review of data including labs, imaging, development and implementation of treatment plan and coordination of complex care.     LORENZO [facility]   Office: [phone]

## 2021-08-15 NOTE — PROGRESS NOTES
[facility] PROGRESS NOTE    8/15/2021 1:53 PM        Name: Lorraine León . Admitted: 8/11/2021  Primary Care Provider: Nara Welch MD (Tel: [phone])      Chief complaint: shortness of breath. Brief History: Patient is a 73 yo female with hx CAD/CABG, PVD, COPD, chronic diastolic heart failure, sleep apnea, DM2, RA. She presented to [facility] with c/o shortness of breath, chest discomfort, orthopnea, BLE edema. On arrival in ER patient was hypoxic with O2 sat 82%. CXR showed pulmonary vascular congestion and bilateral pleural effusion, proBNP 82152. She was transferred to [facility] for admission for CHF exacerbation and started on IV Lasix. Subjective:  Presently resting in bed, daughter Madalyn Funk and patient's sister visiting. Patient feeling better today, reports improved shortness of breath with resumption IV Lasix. Denies chest pain, palpitations, abdominal pain, nausea.       Reviewed interval ancillary notes    Current Medications  potassium chloride (KLOR-CON M) extended release tablet 20 mEq, Daily with breakfast  furosemide (LASIX) injection 20 mg, BID  lip balm petroleum free (PHYTOPLEX) stick, PRN  potassium chloride (KLOR-CON M) extended release tablet 40 mEq, Once  potassium chloride (KLOR-CON M) extended release tablet 40 mEq, PRN   Or  potassium bicarb-citric acid (EFFER-K) effervescent tablet 40 mEq, PRN   Or  potassium chloride 10 mEq/100 mL IVPB (Peripheral Line), PRN  spironolactone (ALDACTONE) tablet 25 mg, Daily  midodrine (PROAMATINE) tablet 2.5 mg, TID WC  albuterol sulfate  (90 Base) MCG/ACT inhaler 2 puff, Q4H PRN  ipratropium-albuterol (DUONEB) nebulizer solution 1 ampule, Q4H WA  aspirin EC tablet 81 mg, Daily  atorvastatin (LIPITOR) tablet 40 mg, Nightly  calcium elemental (OSCAL) tablet 500 mg, Daily  cetirizine (ZYRTEC) tablet 10 mg, Daily  clopidogrel (PLAVIX) tablet 75 08/10/21 155 lb (70.3 kg)   08/10/21 155 lb (70.3 kg)     General:  Awake, alert, oriented in NAD  Skin:  Warm and dry. No unusual bruising or rash  Neck:  Supple. No JVD appreciated  Chest:  Normal effort. Diminished in bases, no wheezes/rhonchi/rales  Cardiovascular:  RRR, normal S1/S2, no murmur/gallop/rub  Abdomen:  Soft, nontender, +bowel sounds  Extremities:  No edema, wound vac to left lower leg and sacrum  Neurological: No focal deficits  Psychological: Normal mood and affect    Labs and Tests:  CBC:   Recent Labs     08/15/21  0533   WBC 4.0   HGB 10.6*        BMP:    Recent Labs     08/13/21  0432 08/14/21  0449 08/15/21  0534   * 133* 135*   K 4.3 3.8 4.0   CL 94* 94* 95*   CO2 32 28 28   BUN 11 9 12   CREATININE 0.7 0.7 0.8   GLUCOSE 176* 126* 125*     Hepatic:   Recent Labs     08/13/21 0432 08/14/21 0449   AST 10* 9*   ALT 6* 6*   BILITOT 0.5 0.6   ALKPHOS 122 117       Results for Malena Swanson (MRN [id number]) as of 8/15/2021 13:59   Ref. Range 8/14/2021 16:28 8/14/2021 20:07 8/15/2021 02:13 8/15/2021 07:12 8/15/2021 11:31   POC Glucose Latest Ref Range: 70 - 99 mg/dl 304 (H) 240 (H) 164 (H) 107 (H) 246 (H)       CXR 8/10/2021:  Vascular congestion.       Bibasilar pleural effusions and airspace disease, greater on the right, with   progression in the interval, most likely pulmonary edema. Echo 8/12/2021:  Summary   -Left ventricular cavity size is enlarged. LVESD=5.57 cm   -Overall left ventricular systolic function appears mildly reduced.   -Ejection fraction is visually estimated to be 50%. -There is mild hypokinesis of the basal to mid inferior and inferolateral   walls. -Grade II diastolic dysfunction with elevated LV filling pressures.   -The right ventricle is mildly enlarged.   -Right ventricular systolic function is normal.   -Mitral valve leaflets appear mildly thickened. -Mitral regurgitation that may be severe.    -The aortic valve leaflets appear mildly thickened. -Mild aortic regurgitation.   -Moderate tricuspid regurgitation with a PASP of 57 mmHg.   -Trivial pulmonic regurgitation. Problem List  Active Problems:    Uncontrolled diabetes mellitus (HCC)    Acute on chronic diastolic heart failure due to coronary artery disease (HCC)    Congestive heart failure of unknown etiology ([facility] [address]    Acute respiratory failure with hypoxia (HCC)    Chronic obstructive pulmonary disease ([facility] [address]  Resolved Problems:    * No resolved hospital problems. *       Assessment & Plan:   1. Acute on chronic diastolic heart failure. CXR with pulmonary vascular congestion and bilateral pleural effusions, proBNP 11,161->0261. Started on IV Lasix with good diuresis, transitioned to po 8/13, back to IV 8/14. Subjectively improved but still needs additional diuresis. Hypotension limiting factor to aggressive diuresis. Cardiology on board. 2. Acute hypoxic respiratory failure. Documented O2 sat 82% on room air at [facility]. Most likely secondary to CHF exacerbation. She does not use O2 at home. O2 has been weaned, presently mid 80s on room air. 3. Chest discomfort/CAD. Troponin 0.03, trend flat, in setting CHF. Continue asa and statin. Echo with EF 50%, mild hypokinesis of basal to mid inferior and inferolateral walls which are new compared to echo 4/2021. She is s/p CABG 4/2021. Cardio on board. 4. Mitral regurgitation. Possibly severe on echo this admission compared to mild on previous (4/2021). Likely contributing factor to CHF. Cardiology considering repeat echo or ABHISHEK to assess. 5. Question of accelerated junctional rhythm on admission EKG. Evaluated by EP, determined to be sinus rhythm. Continue beta blockers, EP has signed off. 6. DM2, controlled. A1c 7.2. Takes Lantus 15 units nightly and sliding scale mealtime insulin at home. BG values high 200s, fasting this am 107. Continue Lantus 15 units, increase mealtime coverage to medium correction.  Continue to follow. 7. COPD. Not in exacerbation, no change in cough or phlegm, no wheezes. Continue roflumilast and duonebs. 8. Rheumatoid arthritis. Takes prednisone 4 mg daily, continue. 9. NSVT. Noted to have 18 beat run NSVT (8/12), associated with palpitations. Potassium was low at 3.2 and replaced, magnesium 1.7 and replaced. No further significant arrhythmia. Continue to monitor. BMP in am.   10. Dehisced surgical wound left leg/sacral pressure injury stage IV. Evaluated by Wound Care, has wound vac in place. Disposition: Patient declines consideration for SNF. Says if necessary she can stay with daughter who works from home. Anticipate home with home care. Diet: ADULT DIET; Regular; 4 carb choices (60 gm/meal); Low Sodium (2 gm); 2000 ml  Adult Oral Nutrition Supplement;  Low Calorie/High Protein Oral Supplement  Code:Full Code  DVT PPX: enoxaparin      Iris [place], APRN - CNP   8/15/2021 1:53 PM

## 2021-08-15 NOTE — PLAN OF CARE
Problem: HEMODYNAMIC STATUS  Goal: Patient has stable vital signs and fluid balance  Outcome: Ongoing     Problem: Falls - Risk of:  Goal: Will remain free from falls  Description: Will remain free from falls  Outcome: Ongoing     Vitals:    08/15/21 0545   BP: 114/62   Pulse: 82   Resp: 18   Temp: 98.3 °F (36.8 °C)   SpO2: 93%     Pt is a high fall risk. Pt remains free from falls, throughout night. Bed alarm remains in place, door open. Pt encouraged to use call light for needs throughout night; call light is within reach. Bed lock is in lowest position. Will continue to monitor throughout night.

## 2021-08-16 LAB
ANION GAP SERPL CALCULATED.3IONS-SCNC: 11 MMOL/L (ref 3–16)
BUN BLDV-MCNC: 14 MG/DL (ref 7–20)
CALCIUM SERPL-MCNC: 8.8 MG/DL (ref 8.3–10.6)
CHLORIDE BLD-SCNC: 96 MMOL/L (ref 99–110)
CO2: 27 MMOL/L (ref 21–32)
CREAT SERPL-MCNC: 0.8 MG/DL (ref 0.6–1.2)
FUNGUS (MYCOLOGY) CULTURE: NORMAL
FUNGUS STAIN: NORMAL
GFR AFRICAN AMERICAN: >60
GFR NON-AFRICAN AMERICAN: >60
GLUCOSE BLD-MCNC: 124 MG/DL (ref 70–99)
GLUCOSE BLD-MCNC: 138 MG/DL (ref 70–99)
GLUCOSE BLD-MCNC: 157 MG/DL (ref 70–99)
GLUCOSE BLD-MCNC: 163 MG/DL (ref 70–99)
GLUCOSE BLD-MCNC: 320 MG/DL (ref 70–99)
PERFORMED ON: ABNORMAL
POTASSIUM SERPL-SCNC: 3.9 MMOL/L (ref 3.5–5.1)
SODIUM BLD-SCNC: 134 MMOL/L (ref 136–145)

## 2021-08-16 PROCEDURE — 2500000003 HC RX 250 WO HCPCS

## 2021-08-16 PROCEDURE — 36415 COLL VENOUS BLD VENIPUNCTURE: CPT

## 2021-08-16 PROCEDURE — B2111ZZ FLUOROSCOPY OF MULTIPLE CORONARY ARTERIES USING LOW OSMOLAR CONTRAST: ICD-10-PCS | Performed by: ANESTHESIOLOGY

## 2021-08-16 PROCEDURE — 94761 N-INVAS EAR/PLS OXIMETRY MLT: CPT

## 2021-08-16 PROCEDURE — 1200000000 HC SEMI PRIVATE

## 2021-08-16 PROCEDURE — 94640 AIRWAY INHALATION TREATMENT: CPT

## 2021-08-16 PROCEDURE — 6370000000 HC RX 637 (ALT 250 FOR IP): Performed by: INTERNAL MEDICINE

## 2021-08-16 PROCEDURE — 2580000003 HC RX 258: Performed by: INTERNAL MEDICINE

## 2021-08-16 PROCEDURE — 6360000002 HC RX W HCPCS

## 2021-08-16 PROCEDURE — 6370000000 HC RX 637 (ALT 250 FOR IP): Performed by: PHYSICIAN ASSISTANT

## 2021-08-16 PROCEDURE — 97535 SELF CARE MNGMENT TRAINING: CPT

## 2021-08-16 PROCEDURE — 6360000002 HC RX W HCPCS: Performed by: NURSE PRACTITIONER

## 2021-08-16 PROCEDURE — 99233 SBSQ HOSP IP/OBS HIGH 50: CPT | Performed by: NURSE PRACTITIONER

## 2021-08-16 PROCEDURE — 93459 L HRT ART/GRFT ANGIO: CPT | Performed by: INTERNAL MEDICINE

## 2021-08-16 PROCEDURE — 93459 L HRT ART/GRFT ANGIO: CPT

## 2021-08-16 PROCEDURE — 99152 MOD SED SAME PHYS/QHP 5/>YRS: CPT

## 2021-08-16 PROCEDURE — 93455 CORONARY ART/GRFT ANGIO S&I: CPT

## 2021-08-16 PROCEDURE — 6370000000 HC RX 637 (ALT 250 FOR IP): Performed by: CLINICAL NURSE SPECIALIST

## 2021-08-16 PROCEDURE — 99153 MOD SED SAME PHYS/QHP EA: CPT

## 2021-08-16 PROCEDURE — 6370000000 HC RX 637 (ALT 250 FOR IP): Performed by: NURSE PRACTITIONER

## 2021-08-16 PROCEDURE — 6360000004 HC RX CONTRAST MEDICATION: Performed by: INTERNAL MEDICINE

## 2021-08-16 PROCEDURE — 2709999900 HC NON-CHARGEABLE SUPPLY

## 2021-08-16 PROCEDURE — C1894 INTRO/SHEATH, NON-LASER: HCPCS

## 2021-08-16 PROCEDURE — 97530 THERAPEUTIC ACTIVITIES: CPT

## 2021-08-16 PROCEDURE — 99152 MOD SED SAME PHYS/QHP 5/>YRS: CPT | Performed by: INTERNAL MEDICINE

## 2021-08-16 PROCEDURE — 80048 BASIC METABOLIC PNL TOTAL CA: CPT

## 2021-08-16 PROCEDURE — 99232 SBSQ HOSP IP/OBS MODERATE 35: CPT | Performed by: INTERNAL MEDICINE

## 2021-08-16 PROCEDURE — 4A023N7 MEASUREMENT OF CARDIAC SAMPLING AND PRESSURE, LEFT HEART, PERCUTANEOUS APPROACH: ICD-10-PCS | Performed by: ANESTHESIOLOGY

## 2021-08-16 PROCEDURE — B2131ZZ FLUOROSCOPY OF MULTIPLE CORONARY ARTERY BYPASS GRAFTS USING LOW OSMOLAR CONTRAST: ICD-10-PCS | Performed by: ANESTHESIOLOGY

## 2021-08-16 PROCEDURE — C1769 GUIDE WIRE: HCPCS

## 2021-08-16 RX ADMIN — ROFLUMILAST 500 MCG: 500 TABLET ORAL at 09:04

## 2021-08-16 RX ADMIN — ATORVASTATIN CALCIUM 40 MG: 40 TABLET, FILM COATED ORAL at 21:07

## 2021-08-16 RX ADMIN — LATANOPROST 1 DROP: 50 SOLUTION OPHTHALMIC at 21:08

## 2021-08-16 RX ADMIN — MORPHINE SULFATE 15 MG: 15 TABLET, FILM COATED, EXTENDED RELEASE ORAL at 21:07

## 2021-08-16 RX ADMIN — Medication 10 ML: at 09:07

## 2021-08-16 RX ADMIN — IOPAMIDOL 195 ML: 755 INJECTION, SOLUTION INTRAVENOUS at 15:49

## 2021-08-16 RX ADMIN — PREDNISONE 4 MG: 1 TABLET ORAL at 08:57

## 2021-08-16 RX ADMIN — DOCUSATE SODIUM 100 MG: 100 CAPSULE, LIQUID FILLED ORAL at 08:56

## 2021-08-16 RX ADMIN — FUROSEMIDE 20 MG: 10 INJECTION, SOLUTION INTRAMUSCULAR; INTRAVENOUS at 17:20

## 2021-08-16 RX ADMIN — PANTOPRAZOLE SODIUM 40 MG: 40 TABLET, DELAYED RELEASE ORAL at 07:31

## 2021-08-16 RX ADMIN — CLOPIDOGREL BISULFATE 75 MG: 75 TABLET ORAL at 08:57

## 2021-08-16 RX ADMIN — DULOXETINE HYDROCHLORIDE 60 MG: 60 CAPSULE, DELAYED RELEASE ORAL at 08:58

## 2021-08-16 RX ADMIN — POTASSIUM CHLORIDE 20 MEQ: 20 TABLET, EXTENDED RELEASE ORAL at 08:57

## 2021-08-16 RX ADMIN — ASPIRIN 81 MG: 81 TABLET, COATED ORAL at 08:57

## 2021-08-16 RX ADMIN — INSULIN LISPRO 2 UNITS: 100 INJECTION, SOLUTION INTRAVENOUS; SUBCUTANEOUS at 13:00

## 2021-08-16 RX ADMIN — CHOLECALCIFEROL TAB 125 MCG (5000 UNIT) 5000 UNITS: 125 TAB at 09:04

## 2021-08-16 RX ADMIN — IPRATROPIUM BROMIDE AND ALBUTEROL SULFATE 1 AMPULE: .5; 3 SOLUTION RESPIRATORY (INHALATION) at 17:22

## 2021-08-16 RX ADMIN — MIDODRINE HYDROCHLORIDE 2.5 MG: 5 TABLET ORAL at 12:57

## 2021-08-16 RX ADMIN — INSULIN GLARGINE 15 UNITS: 100 INJECTION, SOLUTION SUBCUTANEOUS at 21:10

## 2021-08-16 RX ADMIN — IPRATROPIUM BROMIDE AND ALBUTEROL SULFATE 1 AMPULE: .5; 3 SOLUTION RESPIRATORY (INHALATION) at 12:20

## 2021-08-16 RX ADMIN — IPRATROPIUM BROMIDE AND ALBUTEROL SULFATE 1 AMPULE: .5; 3 SOLUTION RESPIRATORY (INHALATION) at 21:13

## 2021-08-16 RX ADMIN — INSULIN LISPRO 4 UNITS: 100 INJECTION, SOLUTION INTRAVENOUS; SUBCUTANEOUS at 21:09

## 2021-08-16 RX ADMIN — Medication 500 MG: at 08:57

## 2021-08-16 RX ADMIN — MORPHINE SULFATE 15 MG: 15 TABLET, FILM COATED, EXTENDED RELEASE ORAL at 08:56

## 2021-08-16 RX ADMIN — IPRATROPIUM BROMIDE AND ALBUTEROL SULFATE 1 AMPULE: .5; 3 SOLUTION RESPIRATORY (INHALATION) at 08:08

## 2021-08-16 RX ADMIN — Medication 10 ML: at 21:09

## 2021-08-16 RX ADMIN — MIDODRINE HYDROCHLORIDE 2.5 MG: 5 TABLET ORAL at 17:21

## 2021-08-16 RX ADMIN — DOCUSATE SODIUM 100 MG: 100 CAPSULE, LIQUID FILLED ORAL at 21:07

## 2021-08-16 RX ADMIN — CETIRIZINE HYDROCHLORIDE 10 MG: 10 TABLET, FILM COATED ORAL at 08:56

## 2021-08-16 RX ADMIN — MIDODRINE HYDROCHLORIDE 2.5 MG: 5 TABLET ORAL at 08:57

## 2021-08-16 RX ADMIN — METOPROLOL TARTRATE 12.5 MG: 25 TABLET, FILM COATED ORAL at 21:07

## 2021-08-16 RX ADMIN — INSULIN LISPRO 2 UNITS: 100 INJECTION, SOLUTION INTRAVENOUS; SUBCUTANEOUS at 18:09

## 2021-08-16 ASSESSMENT — PAIN - FUNCTIONAL ASSESSMENT
PAIN_FUNCTIONAL_ASSESSMENT: PREVENTS OR INTERFERES SOME ACTIVE ACTIVITIES AND ADLS
PAIN_FUNCTIONAL_ASSESSMENT: PREVENTS OR INTERFERES SOME ACTIVE ACTIVITIES AND ADLS

## 2021-08-16 ASSESSMENT — PAIN DESCRIPTION - PROGRESSION
CLINICAL_PROGRESSION: NOT CHANGED

## 2021-08-16 ASSESSMENT — PAIN SCALES - GENERAL
PAINLEVEL_OUTOF10: 8
PAINLEVEL_OUTOF10: 5
PAINLEVEL_OUTOF10: 0
PAINLEVEL_OUTOF10: 0
PAINLEVEL_OUTOF10: 7
PAINLEVEL_OUTOF10: 0

## 2021-08-16 ASSESSMENT — PAIN DESCRIPTION - ORIENTATION
ORIENTATION: LOWER

## 2021-08-16 ASSESSMENT — PAIN DESCRIPTION - DESCRIPTORS
DESCRIPTORS: CONSTANT;ACHING
DESCRIPTORS: ACHING
DESCRIPTORS: ACHING

## 2021-08-16 ASSESSMENT — PAIN DESCRIPTION - LOCATION
LOCATION: BACK

## 2021-08-16 ASSESSMENT — PAIN DESCRIPTION - PAIN TYPE
TYPE: CHRONIC PAIN

## 2021-08-16 ASSESSMENT — PAIN SCALES - WONG BAKER
WONGBAKER_NUMERICALRESPONSE: 0
WONGBAKER_NUMERICALRESPONSE: 0

## 2021-08-16 ASSESSMENT — PAIN DESCRIPTION - ONSET
ONSET: ON-GOING
ONSET: ON-GOING

## 2021-08-16 ASSESSMENT — PAIN DESCRIPTION - FREQUENCY
FREQUENCY: CONTINUOUS
FREQUENCY: CONTINUOUS

## 2021-08-16 NOTE — PROGRESS NOTES
Occupational Therapy  Facility/Department: [address]  Daily Treatment Note  NAME: Oma Whittaker  : 1950  MRN: [id number]    Date of Service: 2021    Discharge Recommendations: Oma Whittaker scored a 19/24 on the AM-PAC ADL Inpatient form. Current research shows that an AM-PAC score of 18 or greater is typically associated with a discharge to the patient's home setting. Based on the patient's AM-PAC score, and their current ADL deficits, it is recommended that the patient have 2-3 sessions per week of Occupational Therapy at d/c to increase the patient's independence. At this time, this patient demonstrates the endurance and safety to discharge home with home OT and a follow up treatment frequency of 2-3x/wk. Please see assessment section for further patient specific details. If patient discharges prior to next session this note will serve as a discharge summary. Please see below for the latest assessment towards goals. HOME HEALTH CARE: LEVEL 1 STANDARD    - Initial home health evaluation to occur within 24-48 hours, in patient home   - Therapy to evaluate with goal of regaining prior level of functioning   - Therapy to evaluate if patient has [id number] needs for personal care       OT Equipment Recommendations  Equipment Needed: No    Assessment   Performance deficits / Impairments: Decreased functional mobility ; Decreased ADL status; Decreased endurance  Assessment: Pt is below her baseline level of occupational function, based on the above deficits associated wtih CHF. Pt would benefit from continued skilled acute OT services to address these deficits. Treatment Diagnosis: Decreased ADL status, functional mobility and endurance associated with CHF  Prognosis: Good  OT Education: OT Role;Plan of Care;Transfer Training  Patient Education: D/C recommendation. Pt independently verbalized understanding.   REQUIRES OT FOLLOW UP: Yes  Activity Tolerance  Activity Tolerance: Patient Tolerated treatment well  Activity Tolerance: No complaints of dizziness this session. Pt O2 at 132/68 after ambulation. RA throughout session. Safety Devices  Safety Devices in place: Yes  Type of devices: All fall risk precautions in place; Left in bed;Call light within reach; Bed alarm in place; Patient at risk for falls;Nurse notified         Patient Diagnosis(es): There were no encounter diagnoses. has a past medical history of Atherosclerosis of native artery of right lower extremity with rest pain ([address] Back pain, Branch retinal vein occlusion, Bronchiectasis with acute exacerbation ([address] Cellulitis and abscess of left leg, Cellulitis of left lower extremity, Closed compression fracture of thoracic vertebra ([address] Closed fracture of facial bone with routine healing, Closed jaw fracture ([address] Community acquired pneumonia of left lower lobe of lung, Compression fracture of L1 lumbar vertebra ([address] COPD (chronic obstructive pulmonary disease) ([address] Fracture of tibial plateau, closed, left, initial encounter, Minimally displaced zone I fracture of sacrum (HCC), MRSA (methicillin resistant staph aureus) culture positive, MRSA (methicillin resistant Staphylococcus aureus), Mucus plugging of bronchi, Osteomyelitis of mandible, Osteoporosis with pathological fracture, Post herpetic neuralgia, Proximal humerus fracture, Rheumatoid arthritis ([address] Shingles, Sleep apnea, Status post incision and drainage, Temporal arteritis ([address] Tobacco use, Tracheomalacia, and Vitreous hemorrhage, right eye ([address] has a past surgical history that includes hernia repair; Mandible fracture surgery; Foot surgery; Elbow surgery; Cataract removal; Tubal ligation; Knee arthroscopy; Kyphosis surgery; laminectomy; Spinal fusion; Septoplasty (05/07/2013); Artery surgery (05/30/2013); Upper gastrointestinal endoscopy (04/08/2014); bronchoscopy; bronchoscopy (N/A, 06/12/2019);  Mandible fracture surgery (02/2020); back surgery (08/2020); other surgical history (01/08/2021); Pressure ulcer debridement (N/A, 01/08/2021); Artery Biopsy (Right, 03/01/2021); Coronary artery bypass graft (N/A, 05/03/2021); Mediastinoscopy (N/A, 05/05/2021); Cardiac surgery (05/03/2021); Leg Surgery (Left, 7/13/2021); and IR MIDLINE CATH (7/14/2021). Restrictions  Restrictions/Precautions  Restrictions/Precautions: Fall Risk, General Precautions, Contact Precautions  Position Activity Restriction  Other position/activity restrictions: Irineo Jones is a 72 y.o. female for shortness of breath. Onset was yesterday. Context includes family reports the patient has had some increasing shortness of breath for a while however yesterday became more severe. Patient does not use oxygen at home. Patient does follow with pulmonary. Family reports they did a chest x-ray and the patient was sent to the ED to be evaluated. Subjective   General  Chart Reviewed: Yes  Patient assessed for rehabilitation services?: Yes  Response to previous treatment: Patient with no complaints from previous session  Family / Caregiver Present: No  Diagnosis: CHF  Subjective  Subjective: Pt side lying in bed, very pleseant and ageeable to OT treat. Vital Signs  Patient Currently in Pain: Denies   Orientation     Objective    ADL  Grooming: Stand by assistance  Toileting: Stand by assistance  Additional Comments: Pt declined further ADLs. Balance  Sitting Balance: Supervision  Standing Balance: Stand by assistance  Standing Balance  Time: 5 min  Activity: functional mobility, weight shifting, ADLs  Toilet Transfers  Toilet - Technique: Ambulating  Equipment Used: Standard toilet  Toilet Transfer: Stand by assistance  Bed mobility  Supine to Sit: Independent  Sit to Supine: Independent  Scooting: Independent  Comment: Pt left in bed for upcoming procedure.   Transfers  Sit to stand: Stand by assistance  Stand to sit: Stand by assistance                       Cognition  Overall Cognitive Status: WNL     Perception  Overall Perceptual Status: WFL                                   Plan   Plan  Times per week: 3-5  Current Treatment Recommendations: Endurance Training, Functional Mobility Training, Self-Care / ADL, Balance Training    Goals  Short term goals  Time Frame for Short term goals: Discharge  Short term goal 1: SBA for functional transfers to ADL surfaces w/RW- met 8/16  Short term goal 2: SBA for functional mobility w/RW for ADL activity- met SBA  Short term goal 3: SBA for toileting- met 8/16  Short term goal 4: Set-up for UB bathing/dressing  Short term goal 5: SBA for LB bathing/dressing  Long term goals  Time Frame for Long term goals : LTG=STG  Long term goal 1: Pt to tolerate standing 10  min for ADL activity/functional mobility       Therapy Time   Individual Concurrent Group Co-treatment   Time In 1316         Time Out 1342         Minutes [address] [place], [place]

## 2021-08-16 NOTE — PLAN OF CARE
Problem: Pain:  Goal: Pain level will decrease  Description: Pain level will decrease  Outcome: Ongoing  Goal: Control of acute pain  Description: Control of acute pain  Outcome: Ongoing  Goal: Control of chronic pain  Description: Control of chronic pain  Outcome: Ongoing     Problem: Falls - Risk of:  Goal: Will remain free from falls  Description: Will remain free from falls  Outcome: Ongoing  Goal: Absence of physical injury  Description: Absence of physical injury  Outcome: Ongoing     Problem: Skin Integrity:  Goal: Will show no infection signs and symptoms  Description: Will show no infection signs and symptoms  Outcome: Ongoing  Goal: Absence of new skin breakdown  Description: Absence of new skin breakdown  Outcome: Ongoing     Problem: OXYGENATION/RESPIRATORY FUNCTION  Goal: Patient will maintain patent airway  Outcome: Ongoing  Goal: Patient will achieve/maintain normal respiratory rate/effort  Description: Respiratory rate and effort will be within normal limits for the patient  Outcome: Ongoing     Problem: HEMODYNAMIC STATUS  Goal: Patient has stable vital signs and fluid balance  Outcome: Ongoing     Problem: FLUID AND ELECTROLYTE IMBALANCE  Goal: Fluid and electrolyte balance are achieved/maintained  Outcome: Ongoing     Problem: ACTIVITY INTOLERANCE/IMPAIRED MOBILITY  Goal: Mobility/activity is maintained at optimum level for patient  Outcome: Ongoing     Problem: Nutrition  Goal: Optimal nutrition therapy  Outcome: Ongoing     8/15 D: Cardiology considering repeat echo or ABHISHEK to assess, PRN percocet given, K replaced,  at dinner, lasix changed to 20 mg BID.

## 2021-08-16 NOTE — PROGRESS NOTES
Nutrition Assessment     Type and Reason for Visit: Reassess    Nutrition Recommendations/Plan:   No new nutrition rec's @ this time. Nutrition Assessment:  Pt remains at risk for nutrition cmpromise AEB increased nutrient needs for wound healing. PO intake usually greater than 50% of meals with fair intake of Ensure supplements. Pt's wt continues to decline with fluid losses related to diuresis (-4L per I/O's). Will con't to monitor progress. No nutrition concerns expressed @ this time. Malnutrition Assessment:  Malnutrition Status: No malnutrition    Estimated Daily Nutrient Needs:  Energy (kcal): 3341-4995 (25-30 kcal/68kg); Weight Used for Energy Requirements:   (con't to use 68 kg for est needs)     Protein (g): 95- 136 g(1.4-2.0g/68kg) (d/t stage 4 wound); Weight Used for Protein Requirements:  Current        Fluid (ml/day):  ; Weight Used for Fluid Requirements:  1 ml/kcal      Nutrition Related Findings: LBM 8/13; +2 edema LLE      Current Nutrition Therapies:    ADULT DIET; Regular; 4 carb choices (60 gm/meal); Low Sodium (2 gm); 2000 ml  Adult Oral Nutrition Supplement;  Low Calorie/High Protein Oral Supplement    Anthropometric Measures:  · Height: 5' 10\" (177.8 cm)  · Current Body Wt: 137 lb (62.1 kg)   · BMI: 19.7    Nutrition Diagnosis:   · Increased nutrient needs related to increase demand for energy/nutrients as evidenced by wounds    · Inadequate oral intake related to inadequate protein-energy intake as evidenced by poor intake prior to admission      Nutrition Interventions:   Food and/or Nutrient Delivery:  Continue Oral Nutrition Supplement, Continue Current Diet  Nutrition Education/Counseling:  Education completed   Coordination of Nutrition Care:  Continue to monitor while inpatient    Goals:  po intake at least 50% of meals & supplements       Nutrition Monitoring and Evaluation:   Behavioral-Environmental Outcomes:  None Identified   Food/Nutrient Intake Outcomes:  Food and Nutrient Intake, Supplement Intake  Physical Signs/Symptoms Outcomes:  Weight, Skin, Fluid Status or Edema     Discharge Planning:    Continue Oral Nutrition Supplement     Electronically signed by Adelia Amaya RD, LD on 8/16/21 at 10:41 AM EDT    Contact: [phone]

## 2021-08-16 NOTE — PROGRESS NOTES
[facility]  HEART FAILURE  Progress Note      Admit Date 8/11/2021     Reason for Consult:      Reason for Consultation/Chief Complaint: SOB    HPI:    Andrzej Cotton is a 72 y.o. female with PMH CAD, MI, CABG 5/3/21, DANIELLE, COPD, RA, HFpEF, DM admiteed with increased SOB, orthopnea, and edema. In the ED, she was hypoxic with oxygen saturation of 82% on room air, requiring 4 liters of oxygen to maintain sats above 90%. Chest Xray shows pulmonary vascular congestion and bibasilar pleural effusion. BNP elevated at 20K. Troponin 0.03, neither increasing or decreasing. EKG did not show acute ST changes, echo with mild hypokinesis of basal to mid inf and inferolateal walls and plan for LHC today      Subjective:  Patient is being seen for CHF. There were no acute overnight cardiac events. Today Ms. Taveras denies chest pain or palpitations, c/o continued SOB but is feeling some better      Baseline Weight:    Wt Readings from Last 3 Encounters:   08/16/21 137 lb 14.4 oz (62.6 kg)   08/10/21 155 lb (70.3 kg)   08/10/21 155 lb (70.3 kg)          NYHA Class III  Objective:   BP (!) 96/47 Comment: Rn notified  Pulse 82   Temp 98.4 °F (36.9 °C) (Oral)   Resp 16   Ht 5' 10\" (1.778 m)   Wt 137 lb 14.4 oz (62.6 kg)   SpO2 94%   BMI 19.79 kg/m²       Intake/Output Summary (Last 24 hours) at 8/16/2021 0916  Last data filed at 8/16/2021 0907  Gross per 24 hour   Intake 490 ml   Output 700 ml   Net -210 ml      Wt Readings from Last 3 Encounters:   08/16/21 137 lb 14.4 oz (62.6 kg)   08/10/21 155 lb (70.3 kg)   08/10/21 155 lb (70.3 kg)      In: 250 [P.O.:240;  I.V.:10]  Out: 200       Physical Exam:  General Appearance:  Non-obese/Well Nourished  Respiratory:  · Resp Auscultation: Normal breath sounds without dullness  Cardiovascular:  · Auscultation: Regular rate and rhythm, normal S1S2, +murmur  · Palpation: Normal    · Pedal Pulses: 2+ and equal   Abdomen:  · Soft, NT, ND, + bs  Extremities:  · No Cyanosis or Clubbing  · Extremities: 1+ and non-pitting edema, errythemia  Neurological/Psychiatric:  · Oriented to time, place, and person  · Non-anxious    MEDICATIONS:   Scheduled Meds:   Scheduled Meds:   potassium chloride  20 mEq Oral Daily with breakfast    furosemide  20 mg Intravenous BID    insulin lispro  0-12 Units Subcutaneous TID WC    insulin lispro  0-6 Units Subcutaneous Nightly    docusate sodium  100 mg Oral BID    potassium chloride  40 mEq Oral Once    spironolactone  25 mg Oral Daily    midodrine  2.5 mg Oral TID     ipratropium-albuterol  1 ampule Inhalation Q4H WA    aspirin EC  81 mg Oral Daily    atorvastatin  40 mg Oral Nightly    calcium elemental  500 mg Oral Daily    cetirizine  10 mg Oral Daily    clopidogrel  75 mg Oral Daily    Roflumilast  500 mcg Oral Daily    DULoxetine  60 mg Oral Daily    fluticasone  2 spray Each Nostril Daily    insulin glargine  15 Units Subcutaneous Nightly    latanoprost  1 drop Both Eyes Nightly    metoprolol tartrate  12.5 mg Oral BID    pantoprazole  40 mg Oral QAM AC    predniSONE  4 mg Oral Daily    vitamin D3  5,000 Units Oral Daily    sodium chloride flush  5-40 mL Intravenous 2 times per day    enoxaparin  40 mg Subcutaneous Daily    morphine  15 mg Oral 2 times per day     Continuous Infusions:   dextrose      sodium chloride Stopped (08/15/21 0404)     PRN Meds:.lip balm petroleum free, potassium chloride **OR** potassium alternative oral replacement **OR** potassium chloride, albuterol sulfate HFA, cyclobenzaprine, glucose, dextrose, glucagon (rDNA), dextrose, sodium chloride flush, sodium chloride, ondansetron **OR** ondansetron, polyethylene glycol, acetaminophen **OR** acetaminophen, perflutren lipid microspheres, oxyCODONE-acetaminophen  Continuous Infusions:   dextrose      sodium chloride Stopped (08/15/21 0404)       Intake/Output Summary (Last 24 hours) at 8/16/2021 0916  Last data filed at 8/16/2021 0907  Gross per 24 hour   Intake 490 ml   Output 700 ml   Net -210 ml       Lab Data:  CBC:   Lab Results   Component Value Date    WBC 4.0 08/15/2021    HGB 10.6 08/15/2021     08/15/2021     BMP:  Lab Results   Component Value Date     08/16/2021    K 3.9 08/16/2021    K 4.1 08/10/2021    CL 96 08/16/2021    CO2 27 08/16/2021    BUN 14 08/16/2021    CREATININE 0.8 08/16/2021    GLUCOSE 138 08/16/2021     INR:   Lab Results   Component Value Date    INR 1.09 07/14/2021    INR 1.04 05/04/2021    INR 1.42 05/03/2021        CARDIAC LABS  ENZYMES:No results for input(s): CKMB, CKMBINDEX, TROPONINI in the last 72 hours. Invalid input(s): CKTOTAL;3  FASTING LIPID PANEL:  Lab Results   Component Value Date    HDL 34 08/11/2021    HDL 43 07/22/2010    LDLCALC 54 08/11/2021    TRIG 97 08/11/2021     LIVER PROFILE:  Lab Results   Component Value Date    AST 9 08/14/2021    AST 10 08/13/2021    ALT 6 08/14/2021    ALT 6 08/13/2021     BNP:   Lab Results   Component Value Date    PROBNP 9,814 08/15/2021    PROBNP 6,845 08/13/2021    PROBNP 20,636 08/10/2021     Iron Studies:    Lab Results   Component Value Date    FERRITIN 99.8 06/28/2020     Lab Results   Component Value Date    IRON 34 (L) 08/11/2021    TIBC 236 (L) 08/11/2021    FERRITIN 99.8 06/28/2020      Iron Deficiency Anemia:  Yes IV Iron Therapy:  Yes, inpt 8/2021  2017 ACC/AHA HF Guidelines:   intravenous iron replacement in patients with New York Heart Association (NYHA) class II and III HF and iron deficiency(ferritin <100 ng/ml or 100-300 ng/ml if transferrin saturation <20%), to improve functional status and QoL. 1. WEIGHT: Admit Weight: 150 lb 8 oz (68.3 kg)      Today  Weight: 137 lb 14.4 oz (62.6 kg)   2. I/O     Intake/Output Summary (Last 24 hours) at 8/16/2021 0916  Last data filed at 8/16/2021 0907  Gross per 24 hour   Intake 490 ml   Output 700 ml   Net -210 ml       Cardiac Testing: Summary   -Left ventricular cavity size is enlarged.  LVESD=5.57

## 2021-08-16 NOTE — PLAN OF CARE
Problem: Pain:  Goal: Pain level will decrease  8/16/2021 0612 by Khushboo Frye  Outcome: Ongoing     Problem: Pain:  Goal: Control of acute pain  8/16/2021 0612 by Khushboo Frye  Outcome: Ongoing     Problem: Pain:  Goal: Control of chronic pain  8/16/2021 0612 by Khushboo Frye  Outcome: Ongoing     Problem: Falls - Risk of:  Goal: Will remain free from falls  8/16/2021 0612 by Khushboo Frye  Outcome: Ongoing     Problem: Falls - Risk of:  Goal: Absence of physical injury  8/16/2021 0612 by Khushboo Frye  Outcome: Ongoing     Problem: Skin Integrity:  Goal: Absence of new skin breakdown  8/16/2021 0612 by Khushboo Frye  Outcome: Ongoing     Problem: OXYGENATION/RESPIRATORY FUNCTION  Goal: Patient will maintain patent airway  8/16/2021 0612 by Khushboo Frye  Outcome: Ongoing     Problem: OXYGENATION/RESPIRATORY FUNCTION  Goal: Patient will achieve/maintain normal respiratory rate/effort  8/16/2021 0612 by Khushboo Frye  Outcome: Ongoing     Problem: HEMODYNAMIC STATUS  Goal: Patient has stable vital signs and fluid balance  8/16/2021 0612 by Khushboo Frye  Outcome: Ongoing     Problem: FLUID AND ELECTROLYTE IMBALANCE  Goal: Fluid and electrolyte balance are achieved/maintained  8/16/2021 0612 by Khushboo Frye  Outcome: Ongoing     Problem: ACTIVITY INTOLERANCE/IMPAIRED MOBILITY  Goal: Mobility/activity is maintained at optimum level for patient  8/16/2021 0612 by Khushboo Frye  Outcome: Ongoing     Problem: Nutrition  Goal: Optimal nutrition therapy  8/16/2021 0612 by Khushboo Frye  Outcome: Ongoing

## 2021-08-16 NOTE — OP NOTE
[address]  Procedure Note    Procedure: [facility] with grafts  Indication: UA, wma, depressed EF, severe MR    Procedure Details:  Consent Access Bleed R Sedat Start Stop Versed Fentanyl Contrast Fluoro EBL Comp Spec   Yes RCFA int MCSFC 1448 1541 1.5 75 195 12.5 <20 None None   *Sedation Note: MCSFC = minimal conscious sedation for comfort    Findings:  Artery Findings/Result   LM Normal   LAD Mid 100% after large diagonal, distal supplied by LIMA   Cx 99% ostial, distal supplied by R-L collaterals   RI N/A   RCA 70% distal, % prox   S-O occluded   S-PLB patent   L-LAD  patent   LVEDP 8   LVG NA due to contrast     Intervention:   None    Post Cath Dx:   Severe native disease as above  Occluded S-OM  Suspect severe MR related to papillary dysfunction related to occluded distal RCA and Cx  Consider MVR when wound issues resolve if MR does not improved with medical therapy  Would followup with Dr. Bruce Ames

## 2021-08-16 NOTE — PRE SEDATION
Brief Pre-Op Note/Sedation Assessment       Patient Demographics     Irineo Smoke 1950 [id number] 8:25 AM     Procedure     Planned Procedure:  Cardiac Catheterization Procedure  Post Procedure Plan:  Return to same level of care  Consent:   I have discussed with the patient and/or the patient representative the indication, alternatives, and the possible risks and/or complications of the planned procedure and the anesthesia methods. The patient and/or patient representative appear to understand and agree to proceed. Indications:   *CAD Presentation: New Onset Angina <= 2 months, Worsening Angina, Cardiomyopathy and LV Dysfunction  *Angina Class(2 wks): No symptoms  *NYHA Class(2wks): Class III - Symptoms of HF on less-than-ordinary exertion, Newly Diagnosed? Yes, Heart Failure Type: Systolic  *Stress/Imaging Tests: None    Patient History     Chief Complaint:  Anginal Equivalent  Dyspnea on Exertion  Dyspnea  Fatigue  Allergies:     Allergies   Allergen Reactions    Atenolol      Cough       Past Medical History:   Past Medical History:   Diagnosis Date    Atherosclerosis of native artery of right lower extremity with rest pain ([address] 07/25/2017    Back pain     Branch retinal vein occlusion 07/20/2012    Bronchiectasis with acute exacerbation (HCC)     Cellulitis and abscess of left leg 7/11/2021    Cellulitis of left lower extremity 7/11/2021    S/P vein harvest 05/2021 for CABG    Closed compression fracture of thoracic vertebra ([address] 01/15/2020    Closed fracture of facial bone with routine healing 11/21/2016    Closed jaw fracture ([address] 01/15/2020    Community acquired pneumonia of left lower lobe of lung     Compression fracture of L1 lumbar vertebra ([address] 01/15/2020    COPD (chronic obstructive pulmonary disease) (HCC)     Fracture of tibial plateau, closed, left, initial encounter 12/05/2017    Minimally displaced zone I fracture of sacrum ([address] 09/02/2020    MRSA (methicillin resistant staph aureus) culture positive 2021    MRSA (methicillin resistant Staphylococcus aureus) 2021    wound    Mucus plugging of bronchi     Osteomyelitis of mandible 2017    Last Assessment & Plan:  Continue ceftriaxone, add flagyl     Osteoporosis with pathological fracture 2018    Severe RA and osteoporosis. Bone density test last year showed severe osteoporosis. Recently, two broken vertebrae (L1, L2) due to coughing. Diagnosed with tracheomalacia and stated she must cough very hard to clear phlegm. Was coughing due to upper respiratory infections which have been treated. Hx of laminectomy and recent kyphoplasty.    Refractured her jawbone which was previously repaired wi    Post herpetic neuralgia     Proximal humerus fracture 10/01/2019    Rheumatoid arthritis ([address]     Shingles 2020    Sleep apnea     Status post incision and drainage 2021    Left Leg    Temporal arteritis ([address] 07/10/2013    Tobacco use 10/19/2017    Tracheomalacia     Vitreous hemorrhage, right eye ([address] 2020     Surgical History:   Past Surgical History:   Procedure Laterality Date    ARTERY BIOPSY Right 2021    at [address]  2013    left temporal artery biopsy    BACK SURGERY  2020    BRONCHOSCOPY      BRONCHOSCOPY N/A 2019    BRONCHOSCOPY ALVEOLAR LAVAGE performed by Petr Mayfield MD at [address]  2021    CATARACT REMOVAL      CORONARY ARTERY BYPASS GRAFT N/A 2021    CORONARY ARTERY BYPASS X3 WITH LEFT ATRIAL APPENDAGE CLIP, 5 LEVEL BILATERAL INTERCOSTAL NERVE BLOCK, STERNAL PLATING performed by Isac Lujan MD at [address]      IR MIDLINE CATH  2021    IR MIDLINE CATH 2021 [facility] PROCEDURES    KNEE ARTHROSCOPY      left    KYPHOSIS SURGERY      LAMINECTOMY      LEG SURGERY Left 2021 INCISION AND DEBRIDEMENT LEFT LOWER LEG WOUND WITH POSSIBLE WOUND VAC PLACEMENT performed by Ricki Brooks MD at [address] [facility]  02/2020    MEDIASTINOSCOPY N/A 05/05/2021    MEDIASTINAL EXPLORATION AND EVACUATION OF HEMATOMA performed by Afsaneh Brower MD at [address]  01/08/2021    sacral wound debridement    PRESSURE ULCER DEBRIDEMENT N/A 01/08/2021    SACRAL WOUND DEBRIDEMENT performed by Carolin Adams MD at [address] SEPTOPLASTY  05/07/2013    FESS with balloon    SPINAL FUSION      TUBAL LIGATION      UPPER GASTROINTESTINAL ENDOSCOPY  04/08/2014    Dilitation     Medications:    Current Facility-Administered Medications   Medication Dose Route Frequency Provider Last Rate Last Admin    potassium chloride (KLOR-CON M) extended release tablet 20 mEq  20 mEq Oral Daily with breakfast LORENZO Rodarte - CNP   20 mEq at 08/15/21 1033    furosemide (LASIX) injection 20 mg  20 mg Intravenous BID Les Leiva APRN - CNP   20 mg at 08/15/21 1754    insulin lispro (1 Unit Dial) 0-12 Units  0-12 Units Subcutaneous TID WC Dorie Maloney APRN - CNP   8 Units at 08/15/21 1746    insulin lispro (1 Unit Dial) 0-6 Units  0-6 Units Subcutaneous Nightly Dorie Maloney APRN - CNP   3 Units at 08/15/21 2309    docusate sodium (COLACE) capsule 100 mg  100 mg Oral BID Samanta Christianson PA-C   100 mg at 08/15/21 2118    lip balm petroleum free (PHYTOPLEX) stick   Topical PRN Dorie Maloney APRN - CNP   Given at 08/14/21 2118    potassium chloride (KLOR-CON M) extended release tablet 40 mEq  40 mEq Oral Once Les Leiva APRN - CNP        potassium chloride (KLOR-CON M) extended release tablet 40 mEq  40 mEq Oral PRN Les Leiva APRN - CNP   40 mEq at 08/14/21 1248    Or    potassium bicarb-citric acid (EFFER-K) effervescent tablet 40 mEq  40 mEq Oral PRN Les Leiva APRN - CNP        Or   [facility] potassium chloride 10 mEq/100 mL IVPB (Peripheral Line)  10 mEq Intravenous PRN Kathryn Gravjohnny, APRN - CNP        spironolactone (ALDACTONE) tablet 25 mg  25 mg Oral Daily Rox Larkin, APRN - CNS   25 mg at 08/15/21 1034    midodrine (PROAMATINE) tablet 2.5 mg  2.5 mg Oral TID  Rox Larkin, APRN - CNS   2.5 mg at 08/15/21 1747    albuterol sulfate  (90 Base) MCG/ACT inhaler 2 puff  2 puff Inhalation Q4H PRN Aníbal Jones MD        ipratropium-albuterol (DUONEB) nebulizer solution 1 ampule  1 ampule Inhalation Q4H WA Aníbal Jones MD   1 ampule at 08/16/21 0808    aspirin EC tablet 81 mg  81 mg Oral Daily Aníbal Jones MD   81 mg at 08/15/21 1034    atorvastatin (LIPITOR) tablet 40 mg  40 mg Oral Nightly Aníbal Jones MD   40 mg at 08/15/21 2118    calcium elemental (OSCAL) tablet 500 mg  500 mg Oral Daily Aníbal Jones MD   500 mg at 08/15/21 1033    cetirizine (ZYRTEC) tablet 10 mg  10 mg Oral Daily Aníbal Jones MD   10 mg at 08/15/21 1035    clopidogrel (PLAVIX) tablet 75 mg  75 mg Oral Daily Aníbal Jones MD   75 mg at 08/15/21 1034    cyclobenzaprine (FLEXERIL) tablet 10 mg  10 mg Oral BID PRN Aníbal Jones MD   10 mg at 08/14/21 2133    Roflumilast (DALIRESP) tablet 500 mcg  500 mcg Oral Daily Aníbal Jones MD   500 mcg at 08/15/21 1043    DULoxetine (CYMBALTA) extended release capsule 60 mg  60 mg Oral Daily Aníbal Jones MD   60 mg at 08/15/21 1034    fluticasone (FLONASE) 50 MCG/ACT nasal spray 2 spray  2 spray Each Nostril Daily Aníbal Jones MD   2 spray at 08/15/21 1032    insulin glargine (LANTUS;BASAGLAR) injection pen 15 Units  15 Units Subcutaneous Nightly Aníbal Jones MD   15 Units at 08/15/21 2310    latanoprost (XALATAN) 0.005 % ophthalmic solution 1 drop  1 drop Both Eyes Nightly Redge MD Karen   1 drop at 08/15/21 2117    metoprolol tartrate (LOPRESSOR) tablet 12.5 mg  12.5 mg Oral BID Devon Bee, MD   12.5 mg at 08/15/21 1033    pantoprazole (PROTONIX) tablet 40 mg  40 mg Oral QAM AC Robin Hooks MD   40 mg at 08/16/21 0731    predniSONE (DELTASONE) tablet 4 mg  4 mg Oral Daily Robin Hooks MD   4 mg at 08/15/21 1034    vitamin D3 (CHOLECALCIFEROL) tablet 5,000 Units  5,000 Units Oral Daily Robin Hooks MD   5,000 Units at 08/15/21 1034    glucose (GLUTOSE) 40 % oral gel 15 g  15 g Oral PRN Robin Hooks MD        dextrose 50 % IV solution  12.5 g Intravenous PRN Robin Hooks MD        glucagon (rDNA) injection 1 mg  1 mg Intramuscular PRN Robin Hooks MD        dextrose 5 % solution  100 mL/hr Intravenous PRN Robin Hooks MD        sodium chloride flush 0.9 % injection 5-40 mL  5-40 mL Intravenous 2 times per day Robin Hooks MD   10 mL at 08/15/21 2133    sodium chloride flush 0.9 % injection 10 mL  10 mL Intravenous PRN Robin Hooks MD   10 mL at 08/15/21 1754    0.9 % sodium chloride infusion  25 mL Intravenous PRN Robin Hooks MD   Stopped at 08/15/21 0404    ondansetron (ZOFRAN-ODT) disintegrating tablet 4 mg  4 mg Oral Q8H PRN Robin Hooks MD        Or    ondansetron (ZOFRAN) injection 4 mg  4 mg Intravenous Q6H PRN Robin Hooks MD        polyethylene glycol (GLYCOLAX) packet 17 g  17 g Oral Daily PRN Robin Hooks MD        acetaminophen (TYLENOL) tablet 650 mg  650 mg Oral Q6H PRN Robin Hooks MD   650 mg at 08/13/21 1445    Or    acetaminophen (TYLENOL) suppository 650 mg  650 mg Rectal Q6H PRN Robin Hooks MD        enoxaparin (LOVENOX) injection 40 mg  40 mg Subcutaneous Daily Devon Bee MD   40 mg at 08/15/21 1033    perflutren lipid microspheres (DEFINITY) injection 1.65 mg  1.5 mL Intravenous ONCE PRN Mia Lancaster MD        morphine (MS CONTIN) extended release tablet 15 mg  15 mg Oral 2 times per day Neisha Toure PA-C   15 mg at 08/15/21 6337    oxyCODONE-acetaminophen (PERCOCET)  MG per tablet 1 tablet  1 tablet Oral Daily PRN Denny Leblanc PA-C   1 tablet at 08/15/21 1445     Anti-Anginal(2wks): ANTI-ANGINAL MEDS: Yes: Beta Blockers, Aspirin and Statin (Any)    Pre-Sedation Assessment     Vital Signs:  BP (!) 96/47 Comment: Rn notified  Pulse 82   Temp 98.4 °F (36.9 °C) (Oral)   Resp 16   Ht 5' 10\" (1.778 m)   Wt 137 lb 14.4 oz (62.6 kg)   SpO2 94%   BMI 19.79 kg/m²   Pre-Sedation Exam: I have assessed the patient and agree with the H&P present on the chart. Hx Anesthesia Comps: None  Modified Mallampati: I (soft palate, uvula, fauces, tonsillar pillars visible  ASA Classification: Class 2 -- A normal healthy patient with mild systemic disease  Pratik Scale: Activity:  Able to move 4 extremities voluntarily on command  Respiration: Able to breathe deeply and cough freely  Circulation: BP+/- 20mmHg of normal  Consciousness: Fully awake  Oxygen Sat: Able to maintain oxygen saturation >92% on room air    Sedation Plan     Goals:   Guard safety/welfare, minimize discomfort/pain/negative psychological responses to treatment by providing sedation/analgesia, maximize potential amnesia. Patient to meet pre-procedure discharge plan.   Meds Planned:  IV fentanyl and/or versed  Sedation Candidacy: Patient is an appropriate candidate for plan of sedation: Yes    Electronic Signature     Carlos Alberto Huang MD on 8/16/2021 at 8:25 AM

## 2021-08-16 NOTE — PROGRESS NOTES
[facility] [place]  Daily Progress Note    Cardio: Carleen  Admit: 8/11/2021      CC: CAD s/p CABG, HTN, CHOL  Subj: Today, doing fair. Denies cp. SOB mildly improved, off O2.       Obj:  BP (!) 96/47 Comment: Rn notified  Pulse 82   Temp 98.4 °F (36.9 °C) (Oral)   Resp 16   Ht 5' 10\" (1.778 m)   Wt 137 lb 14.4 oz (62.6 kg)   SpO2 94%   BMI 19.79 kg/m²     Intake/Output Summary (Last 24 hours) at 8/16/2021 0813  Last data filed at 8/15/2021 1745  Gross per 24 hour   Intake 480 ml   Output 700 ml   Net -220 ml     Gen Alert, coop, no distress Heart Rrr, 2/6   Head NC, AT, no abnorm Abd Soft, NT, ND, +BS, no mass, no OM   Eyes PERRLA, conj/corn clear Ext Ext nl, AT, no c/c/e   Nose Nares nl, no drain, NT Pulse 2+ symm ra, dp, pt   Throat Lips, mucosa, tongue nl Skin Color/tect/turg nl, no rash/lesion   Neck S/S, TM, NT, no bruit/JVD Psych Nl mood and affect   Lung decr bases Lymph No cervical or ax LA   Ch Wall NT, no deform Neuro Nl gross M/S exam     Medications:    potassium chloride  20 mEq Oral Daily with breakfast    furosemide  20 mg Intravenous BID    insulin lispro  0-12 Units Subcutaneous TID WC    insulin lispro  0-6 Units Subcutaneous Nightly    docusate sodium  100 mg Oral BID    potassium chloride  40 mEq Oral Once    spironolactone  25 mg Oral Daily    midodrine  2.5 mg Oral TID WC    ipratropium-albuterol  1 ampule Inhalation Q4H WA    aspirin EC  81 mg Oral Daily    atorvastatin  40 mg Oral Nightly    calcium elemental  500 mg Oral Daily    cetirizine  10 mg Oral Daily    clopidogrel  75 mg Oral Daily    Roflumilast  500 mcg Oral Daily    DULoxetine  60 mg Oral Daily    fluticasone  2 spray Each Nostril Daily    insulin glargine  15 Units Subcutaneous Nightly    latanoprost  1 drop Both Eyes Nightly    metoprolol tartrate  12.5 mg Oral BID    pantoprazole  40 mg Oral QAM AC    predniSONE  4 mg Oral Daily    vitamin D3  5,000 Units Oral Daily    sodium chloride flush 5-40 mL Intravenous 2 times per day    enoxaparin  40 mg Subcutaneous Daily    morphine  15 mg Oral 2 times per day      dextrose      sodium chloride Stopped (08/15/21 0404)     Lab Data: Relevant and available CV data reviewed    Plan:  *CAD  Status Depressed EF, possible sev MR, new inferior/inferolateral wma  CABG S-PLV, S-OM, L-LAD  Plan May suggest perioperative insult, graft failure as no WMA or significant MR prior to surgery   Repeat C to evaluate. *CHF  Status CHF service following  Plan Defer management to them    Time:  More than 35 minutes spent reviewing patient chart (including but not limited to notes, labs, imaging and other testing), interviewing patient and/or family members, performing a physical exam, documentation of my findings above and subsequent follow-up of ordered testing. More than 50% of that time spent face to face with patient discussing clinical condition and counseling regarding treatment plan.

## 2021-08-16 NOTE — PROGRESS NOTES
[facility] PROGRESS NOTE    8/16/2021 1:26 PM        Name: Steven Craig . Admitted: 8/11/2021  Primary Care Provider: Abby Jain MD (Tel: [phone])      Chief complaint: shortness of breath. Brief History: Patient is a 73 yo female with hx CAD/CABG, PVD, COPD, chronic diastolic heart failure, sleep apnea, DM2, RA. She presented to [facility] with c/o shortness of breath, chest discomfort, orthopnea, BLE edema. On arrival in ER patient was hypoxic with O2 sat 82%. CXR showed pulmonary vascular congestion and bilateral pleural effusion, proBNP 13730. She was transferred to [facility] for admission for CHF exacerbation and started on IV Lasix. Interval History: Patient has been slow to improve, hypotension has been limiting factor to more aggressive diuresis. O2 has been able to be weaned and she reports symptomatic improvement but is not yet at baseline. Echo showed EF 50% with mild hypokinesis of basal to mid inferior and inferolateral walls and severe MR, These findings are new compared to pre-CABG echo in 4/2021. [facility] planned for this afternoon to assess for graft failure. Patient presented with wound vac to dehisced surgical wound left lower leg and sacral pressure injury stage IV. Wound care nurse is following. Subjective:  Presently ambulating in room, returning from  with PT/OT. Gait steady with walker. Reports breathing improved but still notes exertional dyspnea. Denies chest pain, lightheadedness, abdominal pain, nausea. [facility] planned for this afternoon.      Reviewed interval ancillary notes    Current Medications  potassium chloride (KLOR-CON M) extended release tablet 20 mEq, Daily with breakfast  furosemide (LASIX) injection 20 mg, BID  insulin lispro (1 Unit Dial) 0-12 Units, TID WC  insulin lispro (1 Unit Dial) 0-6 Units, Nightly  docusate sodium (COLACE) capsule 100 mg, BID  lip balm petroleum free (PHYTOPLEX) stick, PRN  potassium chloride (KLOR-CON M) extended release tablet 40 mEq, Once  potassium chloride (KLOR-CON M) extended release tablet 40 mEq, PRN   Or  potassium bicarb-citric acid (EFFER-K) effervescent tablet 40 mEq, PRN   Or  potassium chloride 10 mEq/100 mL IVPB (Peripheral Line), PRN  spironolactone (ALDACTONE) tablet 25 mg, Daily  midodrine (PROAMATINE) tablet 2.5 mg, TID WC  albuterol sulfate  (90 Base) MCG/ACT inhaler 2 puff, Q4H PRN  ipratropium-albuterol (DUONEB) nebulizer solution 1 ampule, Q4H WA  aspirin EC tablet 81 mg, Daily  atorvastatin (LIPITOR) tablet 40 mg, Nightly  calcium elemental (OSCAL) tablet 500 mg, Daily  cetirizine (ZYRTEC) tablet 10 mg, Daily  clopidogrel (PLAVIX) tablet 75 mg, Daily  cyclobenzaprine (FLEXERIL) tablet 10 mg, BID PRN  Roflumilast (DALIRESP) tablet 500 mcg, Daily  DULoxetine (CYMBALTA) extended release capsule 60 mg, Daily  fluticasone (FLONASE) 50 MCG/ACT nasal spray 2 spray, Daily  insulin glargine (LANTUS;BASAGLAR) injection pen 15 Units, Nightly  latanoprost (XALATAN) 0.005 % ophthalmic solution 1 drop, Nightly  metoprolol tartrate (LOPRESSOR) tablet 12.5 mg, BID  pantoprazole (PROTONIX) tablet 40 mg, QAM AC  predniSONE (DELTASONE) tablet 4 mg, Daily  vitamin D3 (CHOLECALCIFEROL) tablet 5,000 Units, Daily  glucose (GLUTOSE) 40 % oral gel 15 g, PRN  dextrose 50 % IV solution, PRN  glucagon (rDNA) injection 1 mg, PRN  dextrose 5 % solution, PRN  sodium chloride flush 0.9 % injection 5-40 mL, 2 times per day  sodium chloride flush 0.9 % injection 10 mL, PRN  0.9 % sodium chloride infusion, PRN  ondansetron (ZOFRAN-ODT) disintegrating tablet 4 mg, Q8H PRN   Or  ondansetron (ZOFRAN) injection 4 mg, Q6H PRN  polyethylene glycol (GLYCOLAX) packet 17 g, Daily PRN  acetaminophen (TYLENOL) tablet 650 mg, Q6H PRN   Or  acetaminophen (TYLENOL) suppository 650 mg, Q6H PRN  enoxaparin (LOVENOX) injection 40 mg, Daily  perflutren lipid microspheres (DEFINITY) injection 1.65 mg, ONCE PRN  morphine (MS CONTIN) extended release tablet 15 mg, 2 times per day  oxyCODONE-acetaminophen (PERCOCET)  MG per tablet 1 tablet, Daily PRN        Objective:  BP (!) 112/57   Pulse 94   Temp 98.1 °F (36.7 °C) (Oral)   Resp 16   Ht 5' 10\" (1.778 m)   Wt 137 lb 14.4 oz (62.6 kg)   SpO2 94%   BMI 19.79 kg/m²     Intake/Output Summary (Last 24 hours) at 8/16/2021 1326  Last data filed at 8/16/2021 8644  Gross per 24 hour   Intake 250 ml   Output 200 ml   Net 50 ml      Wt Readings from Last 3 Encounters:   08/16/21 137 lb 14.4 oz (62.6 kg)   08/10/21 155 lb (70.3 kg)   08/10/21 155 lb (70.3 kg)     General:  Awake, alert, oriented in NAD  Skin:  Warm and dry. No unusual bruising or rash  Neck:  Supple. No JVD appreciated  Chest:  Normal effort. Diminished in bases, no wheezes  Cardiovascular:  RRR, normal S1/S2, no murmur/gallop/rub  Abdomen:  Soft, nontender, +bowel sounds  Extremities:  No edema  Neurological: No focal deficits  Psychological: Normal mood and affect    Labs and Tests:  CBC:   Recent Labs     08/15/21  0533   WBC 4.0   HGB 10.6*        BMP:    Recent Labs     08/14/21  0449 08/15/21  0534 08/16/21  0442   * 135* 134*   K 3.8 4.0 3.9   CL 94* 95* 96*   CO2 28 28 27   BUN 9 12 14   CREATININE 0.7 0.8 0.8   GLUCOSE 126* 125* 138*     Hepatic:   Recent Labs     08/14/21 0449   AST 9*   ALT 6*   BILITOT 0.6   ALKPHOS 117       [id number] as of 8/16/2021 19:32   Ref. Range 8/15/2021 23:09 8/16/2021 07:56 8/16/2021 11:31 8/16/2021 16:46   POC Glucose Latest Ref Range: 70 - 99 mg/dl 280 (H) 124 (H) 163 (H) 157 (H)       CXR 8/10/2021:  Vascular congestion.       Bibasilar pleural effusions and airspace disease, greater on the right, with   progression in the interval, most likely pulmonary edema. Echo 8/12/2021:  Summary   -Left ventricular cavity size is enlarged.  LVESD=5.57 cm -Overall left ventricular systolic function appears mildly reduced.   -Ejection fraction is visually estimated to be 50%. -There is mild hypokinesis of the basal to mid inferior and inferolateral   walls. -Grade II diastolic dysfunction with elevated LV filling pressures.   -The right ventricle is mildly enlarged.   -Right ventricular systolic function is normal.   -Mitral valve leaflets appear mildly thickened. -Mitral regurgitation that may be severe. -The aortic valve leaflets appear mildly thickened. -Mild aortic regurgitation.   -Moderate tricuspid regurgitation with a PASP of 57 mmHg.   -Trivial pulmonic regurgitation. Problem List  Active Problems:    Uncontrolled diabetes mellitus (HCC)    Acute on chronic diastolic heart failure due to coronary artery disease ([facility])    Congestive heart failure of unknown etiology ([facility]    Acute respiratory failure with hypoxia ([facility])    Chronic obstructive pulmonary disease ([facility]  Resolved Problems:    * No resolved hospital problems. *       Assessment & Plan:   1. Acute on chronic diastolic heart failure. CXR with pulmonary vascular congestion and bilateral pleural effusions, proBNP 20,[phone]->6845->9814. Started on IV Lasix with good diuresis, transitioned to po 8/13, back to IV 8/14 for worsening dyspnea. Hypotension limiting factor to aggressive diuresis. Lasix held today for [facility]. Cardiology on board. 2. Acute hypoxic respiratory failure. Documented O2 sat 82% on room air at [facility]. Most likely secondary to CHF exacerbation. She does not use O2 at home. O2 has been weaned, presently mid 80s on room air. 3. Chest discomfort/CAD. Troponin 0.03, trend flat, in setting CHF. Echo with EF 50%, mild hypokinesis of basal to mid inferior and inferolateral walls which are new compared to echo 4/2021. She is s/p CABG 4/2021. [facility] planned for this afternoon. Continue asa and statin. 4. Mitral regurgitation.  Possibly severe on echo this admission compared

## 2021-08-17 VITALS
BODY MASS INDEX: 18.78 KG/M2 | DIASTOLIC BLOOD PRESSURE: 63 MMHG | HEART RATE: 80 BPM | TEMPERATURE: 97.6 F | WEIGHT: 131.2 LBS | HEIGHT: 70 IN | RESPIRATION RATE: 18 BRPM | SYSTOLIC BLOOD PRESSURE: 108 MMHG | OXYGEN SATURATION: 98 %

## 2021-08-17 LAB
ANION GAP SERPL CALCULATED.3IONS-SCNC: 9 MMOL/L (ref 3–16)
BUN BLDV-MCNC: 12 MG/DL (ref 7–20)
CALCIUM SERPL-MCNC: 8.8 MG/DL (ref 8.3–10.6)
CHLORIDE BLD-SCNC: 98 MMOL/L (ref 99–110)
CO2: 27 MMOL/L (ref 21–32)
CREAT SERPL-MCNC: 0.7 MG/DL (ref 0.6–1.2)
GFR AFRICAN AMERICAN: >60
GFR NON-AFRICAN AMERICAN: >60
GLUCOSE BLD-MCNC: 110 MG/DL (ref 70–99)
GLUCOSE BLD-MCNC: 162 MG/DL (ref 70–99)
GLUCOSE BLD-MCNC: 66 MG/DL (ref 70–99)
GLUCOSE BLD-MCNC: 71 MG/DL (ref 70–99)
HCT VFR BLD CALC: 30.7 % (ref 36–48)
HEMOGLOBIN: 9.9 G/DL (ref 12–16)
MAGNESIUM: 1.8 MG/DL (ref 1.8–2.4)
MCH RBC QN AUTO: 26.9 PG (ref 26–34)
MCHC RBC AUTO-ENTMCNC: 32.3 G/DL (ref 31–36)
MCV RBC AUTO: 83.3 FL (ref 80–100)
PDW BLD-RTO: 17.1 % (ref 12.4–15.4)
PERFORMED ON: ABNORMAL
PLATELET # BLD: 269 K/UL (ref 135–450)
PMV BLD AUTO: 7.5 FL (ref 5–10.5)
POTASSIUM SERPL-SCNC: 4 MMOL/L (ref 3.5–5.1)
PRO-BNP: 5729 PG/ML (ref 0–124)
RBC # BLD: 3.69 M/UL (ref 4–5.2)
SODIUM BLD-SCNC: 134 MMOL/L (ref 136–145)
WBC # BLD: 4.6 K/UL (ref 4–11)

## 2021-08-17 PROCEDURE — 6360000002 HC RX W HCPCS: Performed by: NURSE PRACTITIONER

## 2021-08-17 PROCEDURE — 94680 O2 UPTK RST&XERS DIR SIMPLE: CPT

## 2021-08-17 PROCEDURE — 97116 GAIT TRAINING THERAPY: CPT

## 2021-08-17 PROCEDURE — 83880 ASSAY OF NATRIURETIC PEPTIDE: CPT

## 2021-08-17 PROCEDURE — 99233 SBSQ HOSP IP/OBS HIGH 50: CPT | Performed by: NURSE PRACTITIONER

## 2021-08-17 PROCEDURE — 85027 COMPLETE CBC AUTOMATED: CPT

## 2021-08-17 PROCEDURE — 6370000000 HC RX 637 (ALT 250 FOR IP): Performed by: CLINICAL NURSE SPECIALIST

## 2021-08-17 PROCEDURE — 6370000000 HC RX 637 (ALT 250 FOR IP): Performed by: INTERNAL MEDICINE

## 2021-08-17 PROCEDURE — 80048 BASIC METABOLIC PNL TOTAL CA: CPT

## 2021-08-17 PROCEDURE — 97110 THERAPEUTIC EXERCISES: CPT

## 2021-08-17 PROCEDURE — 94640 AIRWAY INHALATION TREATMENT: CPT

## 2021-08-17 PROCEDURE — 94761 N-INVAS EAR/PLS OXIMETRY MLT: CPT

## 2021-08-17 PROCEDURE — 2580000003 HC RX 258: Performed by: INTERNAL MEDICINE

## 2021-08-17 PROCEDURE — 6370000000 HC RX 637 (ALT 250 FOR IP): Performed by: PHYSICIAN ASSISTANT

## 2021-08-17 PROCEDURE — 83735 ASSAY OF MAGNESIUM: CPT

## 2021-08-17 PROCEDURE — 36415 COLL VENOUS BLD VENIPUNCTURE: CPT

## 2021-08-17 PROCEDURE — 99232 SBSQ HOSP IP/OBS MODERATE 35: CPT | Performed by: NURSE PRACTITIONER

## 2021-08-17 PROCEDURE — 6370000000 HC RX 637 (ALT 250 FOR IP): Performed by: NURSE PRACTITIONER

## 2021-08-17 RX ORDER — MIDODRINE HYDROCHLORIDE 2.5 MG/1
2.5 TABLET ORAL
Qty: 90 TABLET | Refills: 3 | Status: SHIPPED | OUTPATIENT
Start: 2021-08-17 | End: 2021-08-22 | Stop reason: ALTCHOICE

## 2021-08-17 RX ORDER — SPIRONOLACTONE 25 MG/1
25 TABLET ORAL DAILY
Qty: 30 TABLET | Refills: 0 | Status: SHIPPED | OUTPATIENT
Start: 2021-08-18 | End: 2021-09-16 | Stop reason: SDUPTHER

## 2021-08-17 RX ORDER — FUROSEMIDE 40 MG/1
40 TABLET ORAL DAILY
Qty: 30 TABLET | Refills: 0 | Status: SHIPPED | OUTPATIENT
Start: 2021-08-17 | End: 2021-09-17 | Stop reason: ALTCHOICE

## 2021-08-17 RX ADMIN — METOPROLOL TARTRATE 12.5 MG: 25 TABLET, FILM COATED ORAL at 08:28

## 2021-08-17 RX ADMIN — PREDNISONE 4 MG: 1 TABLET ORAL at 08:36

## 2021-08-17 RX ADMIN — Medication 10 ML: at 08:25

## 2021-08-17 RX ADMIN — CLOPIDOGREL BISULFATE 75 MG: 75 TABLET ORAL at 08:30

## 2021-08-17 RX ADMIN — ASPIRIN 81 MG: 81 TABLET, COATED ORAL at 08:28

## 2021-08-17 RX ADMIN — CETIRIZINE HYDROCHLORIDE 10 MG: 10 TABLET, FILM COATED ORAL at 08:30

## 2021-08-17 RX ADMIN — MIDODRINE HYDROCHLORIDE 2.5 MG: 5 TABLET ORAL at 08:30

## 2021-08-17 RX ADMIN — DOCUSATE SODIUM 100 MG: 100 CAPSULE, LIQUID FILLED ORAL at 08:28

## 2021-08-17 RX ADMIN — POLYETHYLENE GLYCOL 3350 17 G: 17 POWDER, FOR SOLUTION ORAL at 08:37

## 2021-08-17 RX ADMIN — DULOXETINE HYDROCHLORIDE 60 MG: 60 CAPSULE, DELAYED RELEASE ORAL at 08:28

## 2021-08-17 RX ADMIN — MORPHINE SULFATE 15 MG: 15 TABLET, FILM COATED, EXTENDED RELEASE ORAL at 08:28

## 2021-08-17 RX ADMIN — SPIRONOLACTONE 25 MG: 25 TABLET ORAL at 08:29

## 2021-08-17 RX ADMIN — PANTOPRAZOLE SODIUM 40 MG: 40 TABLET, DELAYED RELEASE ORAL at 06:29

## 2021-08-17 RX ADMIN — POTASSIUM CHLORIDE 20 MEQ: 20 TABLET, EXTENDED RELEASE ORAL at 08:29

## 2021-08-17 RX ADMIN — CHOLECALCIFEROL TAB 125 MCG (5000 UNIT) 5000 UNITS: 125 TAB at 08:30

## 2021-08-17 RX ADMIN — INSULIN LISPRO 2 UNITS: 100 INJECTION, SOLUTION INTRAVENOUS; SUBCUTANEOUS at 12:39

## 2021-08-17 RX ADMIN — MIDODRINE HYDROCHLORIDE 2.5 MG: 5 TABLET ORAL at 12:37

## 2021-08-17 RX ADMIN — IPRATROPIUM BROMIDE AND ALBUTEROL SULFATE 1 AMPULE: .5; 3 SOLUTION RESPIRATORY (INHALATION) at 12:42

## 2021-08-17 RX ADMIN — Medication 500 MG: at 08:28

## 2021-08-17 RX ADMIN — ROFLUMILAST 500 MCG: 500 TABLET ORAL at 08:36

## 2021-08-17 RX ADMIN — FUROSEMIDE 20 MG: 10 INJECTION, SOLUTION INTRAMUSCULAR; INTRAVENOUS at 08:25

## 2021-08-17 RX ADMIN — FLUTICASONE PROPIONATE 2 SPRAY: 50 SPRAY, METERED NASAL at 08:38

## 2021-08-17 ASSESSMENT — PAIN DESCRIPTION - ORIENTATION
ORIENTATION: LOWER

## 2021-08-17 ASSESSMENT — PAIN DESCRIPTION - PAIN TYPE
TYPE: CHRONIC PAIN

## 2021-08-17 ASSESSMENT — PAIN SCALES - GENERAL
PAINLEVEL_OUTOF10: 0
PAINLEVEL_OUTOF10: 0
PAINLEVEL_OUTOF10: 5
PAINLEVEL_OUTOF10: 6
PAINLEVEL_OUTOF10: 6
PAINLEVEL_OUTOF10: 0
PAINLEVEL_OUTOF10: 8
PAINLEVEL_OUTOF10: 8
PAINLEVEL_OUTOF10: 0

## 2021-08-17 ASSESSMENT — PAIN DESCRIPTION - DESCRIPTORS
DESCRIPTORS: ACHING
DESCRIPTORS: ACHING

## 2021-08-17 ASSESSMENT — PAIN DESCRIPTION - PROGRESSION
CLINICAL_PROGRESSION: NOT CHANGED
CLINICAL_PROGRESSION: NOT CHANGED

## 2021-08-17 ASSESSMENT — PAIN DESCRIPTION - LOCATION
LOCATION: BACK

## 2021-08-17 ASSESSMENT — PAIN - FUNCTIONAL ASSESSMENT: PAIN_FUNCTIONAL_ASSESSMENT: PREVENTS OR INTERFERES SOME ACTIVE ACTIVITIES AND ADLS

## 2021-08-17 ASSESSMENT — PAIN DESCRIPTION - FREQUENCY: FREQUENCY: CONTINUOUS

## 2021-08-17 ASSESSMENT — PAIN DESCRIPTION - ONSET: ONSET: ON-GOING

## 2021-08-17 NOTE — CARE COORDINATION
Patient discharged on 8/17/21 to home with skilled hhc/ Wound Vac care services via  [employer]. Cathy Askew [address] oxygen via [facility].     All discharge needs met per case management

## 2021-08-17 NOTE — CARE COORDINATION
Viv Villa with [facility] has been informed of the DME order for home oxygen. Pt's RN aware.

## 2021-08-17 NOTE — PROGRESS NOTES
[address]   Cardiology Progress Note     Date: 8/17/2021  Admit Date: 8/11/2021     Reason for consultation: CAD, MR    Chief Complaint: No chief complaint on file. History of Present Illness: History obtained from patient and medical record. Susanna Ardon is a 72 y.o. female presented to ER with c/o SOB increasing over preceding weeks. Interval Hx: Today, she reports feeling well today. No chest pain, SOB, palpitations. Had 5 beats of wide complex tachycardia on monitor this AM. Asymptomatic. Right groin procedure site c/d/I. No hematoma or bruising. Good pulses, color, warmth, and sensation to that extremity. She is eager to go home. Patient seen and examined. Clinical notes reviewed. Telemetry reviewed. No new complaints today. No major events overnight. Denies having chest pain, palpitations, shortness of breath, orthopnea/PND, cough, or dizziness at the time of this visit.       Past Medical History:  Past Medical History:   Diagnosis Date    Atherosclerosis of native artery of right lower extremity with rest pain ([address] 07/25/2017    Back pain     Branch retinal vein occlusion 07/20/2012    Bronchiectasis with acute exacerbation (HCC)     Cellulitis and abscess of left leg 7/11/2021    Cellulitis of left lower extremity 7/11/2021    S/P vein harvest 05/2021 for CABG    Closed compression fracture of thoracic vertebra ([address] 01/15/2020    Closed fracture of facial bone with routine healing 11/21/2016    Closed jaw fracture ([address] 01/15/2020    Community acquired pneumonia of left lower lobe of lung     Compression fracture of L1 lumbar vertebra ([address] 01/15/2020    COPD (chronic obstructive pulmonary disease) (HCC)     Fracture of tibial plateau, closed, left, initial encounter 12/05/2017    Minimally displaced zone I fracture of sacrum ([facility] 09/02/2020    MRSA (methicillin resistant staph aureus) culture positive 07/2021    MRSA (methicillin resistant Staphylococcus aureus) 07/13/2021    wound    Mucus plugging of bronchi     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 Bone density test last year showed severe osteoporosis. Recently, two broken vertebrae (L1, L2) due to coughing. Diagnosed with tracheomalacia and stated she must cough very hard to clear phlegm. Was coughing due to upper respiratory infections which have been treated. Hx of laminectomy and recent kyphoplasty. Refractured her jawbone which was previously repaired wi    Post herpetic neuralgia     Proximal humerus fracture 10/01/2019    Rheumatoid arthritis ([address]     Shingles 05/2020    Sleep apnea     Status post incision and drainage 07/2021    Left Leg    Temporal arteritis ([address] 07/10/2013    Tobacco use 10/19/2017    Tracheomalacia     Vitreous hemorrhage, right eye ([address] 02/21/2020        Past Surgical History:    has a past surgical history that includes hernia repair; Mandible fracture surgery; Foot surgery; Elbow surgery; Cataract removal; Tubal ligation; Knee arthroscopy; Kyphosis surgery; laminectomy; Spinal fusion; Septoplasty (05/07/2013); Artery surgery (05/30/2013); Upper gastrointestinal endoscopy (04/08/2014); bronchoscopy; bronchoscopy (N/A, 06/12/2019); Mandible fracture surgery (02/2020); back surgery (08/2020); other surgical history (01/08/2021); Pressure ulcer debridement (N/A, 01/08/2021); Artery Biopsy (Right, 03/01/2021); Coronary artery bypass graft (N/A, 05/03/2021); Mediastinoscopy (N/A, 05/05/2021); Cardiac surgery (05/03/2021); Leg Surgery (Left, 7/13/2021); and IR MIDLINE CATH (7/14/2021). Social History:  Reviewed. reports that she quit smoking about 30 years ago. Her smoking use included cigarettes. She has a 20.00 pack-year smoking history. She has never used smokeless tobacco. She reports previous alcohol use.  She reports that she does not use drugs. Allergies: Allergies   Allergen Reactions    Atenolol      Cough         Family History:  Reviewed. family history includes Asthma in an other family member; Diabetes in her brother, mother, and sister; Heart Disease in her brother and brother; Hypertension in her mother. Denies family history of sudden cardiac death, arrhythmia, premature CAD    Home Meds:  Prior to Visit Medications    Medication Sig Taking? Authorizing Provider   DULoxetine (CYMBALTA) 60 MG extended release capsule Take 60 mg by mouth daily  Historical Provider, MD   DALIRESP 500 MCG tablet TAKE 1 TABLET BY MOUTH DAILY  Brigitte Lozada MD   clopidogrel (PLAVIX) 75 MG tablet Take 1 tablet by mouth daily  Vinicius Dela Cruz MD   metoprolol tartrate (LOPRESSOR) 25 MG tablet Take 0.5 tablets by mouth 2 times daily Hold this medication for pulse <51 or systolic BP <925  Vinicius Dela Cruz MD   predniSONE (DELTASONE) 1 MG tablet Take 4 mg by mouth daily  Historical Provider, MD   insulin glargine (LANTUS) 100 UNIT/ML injection vial Inject 15 Units into the skin nightly  Mia LORENZO Jessica - CNP   oxyCODONE-acetaminophen (PERCOCET)  MG per tablet Take 1 tablet by mouth daily. Historical Provider, MD   docusate sodium (COLACE) 100 MG capsule Take 100 mg by mouth 2 times daily as needed for Constipation  Historical Provider, MD   gabapentin (NEURONTIN) 300 MG capsule Take 300 mg by mouth 2 times daily. Historical Provider, MD   latanoprost (XALATAN) 0.005 % ophthalmic solution Place 1 drop into both eyes nightly  Historical Provider, MD   Teriparatide, Recombinant, (FORTEO) 600 MCG/2.4ML SOPN injection Inject 20 mcg into the skin daily  Historical Provider, MD   NARCAN 4 MG/0.1ML LIQD nasal spray as needed   Historical Provider, MD   morphine (MS CONTIN) 15 MG extended release tablet 15 mg 2 times daily.    Historical Provider, MD   ipratropium (ATROVENT) 0.06 % nasal spray USE 2 SPRAYS BY NASAL ROUTE 2-4 TIMES DAILY  Skylar Segundo MD   albuterol sulfate  (90 Base) MCG/ACT inhaler INHALE 2 PUFFS INTO THE LUNGS EVERY 4 HOURS AS NEEDED FOR WHEEZING  Wai Garcia MD   vitamin D (CHOLECALCIFEROL) 1000 UNIT TABS tablet Take 5,000 Units by mouth daily   Historical Provider, MD   calcium carbonate (OSCAL) 500 MG TABS tablet Take 500 mg by mouth daily  Historical Provider, MD   atorvastatin (LIPITOR) 40 MG tablet Take 40 mg by mouth  Historical Provider, MD   cyclobenzaprine (FLEXERIL) 10 MG tablet Take 10 mg by mouth 2 times daily as needed   Historical Provider, MD   Insulin Syringe-Needle U-100 31G X 5/16\" 0.5 ML MISC USE 5 TIMES DAILY  Historical Provider, MD   ACCU-CHEK CEDRICK PLUS strip TEST 4 TIMES DAILY  Historical Provider, MD   aspirin EC 81 MG EC tablet Take 1 tablet by mouth daily  Mary Grayson MD   insulin lispro (HUMALOG) 100 UNIT/ML injection vial Inject 0-12 Units into the skin 3 times daily (with meals)  Mary Grayson MD   Misc. Devices (ACAPELLA) MISC Take 1 Device by mouth as needed  LORENZO Cooley CNP   fluticasone (FLONASE) 50 MCG/ACT nasal spray INHALE 2 SPRAYS IN EACH NOSTRIL DAILY  Skylar Segundo MD   cetirizine (ZYRTEC) 10 MG tablet Take 10 mg by mouth daily.     Historical Provider, MD   omeprazole (PRILOSEC) 40 MG capsule Take 40 mg by mouth daily   Historical Provider, MD        Scheduled Meds:   potassium chloride  20 mEq Oral Daily with breakfast    furosemide  20 mg Intravenous BID    insulin lispro  0-12 Units Subcutaneous TID WC    insulin lispro  0-6 Units Subcutaneous Nightly    docusate sodium  100 mg Oral BID    potassium chloride  40 mEq Oral Once    spironolactone  25 mg Oral Daily    midodrine  2.5 mg Oral TID WC    ipratropium-albuterol  1 ampule Inhalation Q4H WA    aspirin EC  81 mg Oral Daily    atorvastatin  40 mg Oral Nightly    calcium elemental  500 mg Oral Daily    cetirizine  10 mg Oral Daily    clopidogrel  75 mg Oral Daily    Roflumilast  500 mcg Oral Daily    DULoxetine  60 mg Oral Daily    fluticasone  2 spray Each Nostril Daily    insulin glargine  15 Units Subcutaneous Nightly    latanoprost  1 drop Both Eyes Nightly    metoprolol tartrate  12.5 mg Oral BID    pantoprazole  40 mg Oral QAM AC    predniSONE  4 mg Oral Daily    vitamin D3  5,000 Units Oral Daily    sodium chloride flush  5-40 mL Intravenous 2 times per day    enoxaparin  40 mg Subcutaneous Daily    morphine  15 mg Oral 2 times per day     Continuous Infusions:   dextrose      sodium chloride Stopped (08/15/21 0404)     PRN Meds:lip balm petroleum free, potassium chloride **OR** potassium alternative oral replacement **OR** potassium chloride, albuterol sulfate HFA, cyclobenzaprine, glucose, dextrose, glucagon (rDNA), dextrose, sodium chloride flush, sodium chloride, ondansetron **OR** ondansetron, polyethylene glycol, acetaminophen **OR** acetaminophen, perflutren lipid microspheres, oxyCODONE-acetaminophen     Review of Systems:  · Constitutional: Negative for fever, night sweats, chills,  · Skin: Negative for rash, pruritus, bleeding, or bruising    · HEENT: Negative for vision changes, ringing in the ears, dysphagia  · Respiratory: Reviewed in HPI  · Cardiovascular: Reviewed in HPI  · Gastrointestinal: Negative for abdominal pain, N/V/D, constipation, or black/tarry stools  · Genito-Urinary: Negative for dysuria, incontinence, or hematuria  · Musculoskeletal: Negative for joint swelling, muscle pain, or injuries  · Neurological/Psych: Negative for confusion, seizures, headaches, or TIA-like symptoms. No anxiety or depression.      Physical Examination:  Vitals:    08/17/21 0821   BP: 116/65   Pulse: 80   Resp:    Temp:    SpO2:       In: 10 [I.V.:10]  Out: 900    Wt Readings from Last 3 Encounters:   08/17/21 131 lb 3.2 oz (59.5 kg)   08/10/21 155 lb (70.3 kg)   08/10/21 155 lb (70.3 kg)       Intake/Output Summary (Last 24 hours) at 8/17/2021 0848  Last data filed at 8/17/2021 0045  Gross per 24 hour   Intake 20 ml   Output 1250 ml   Net -1230 ml       Telemetry: Personally Reviewed  - Sinus rhythm   · Constitutional: Cooperative and in no apparent distress, and appears frail. · Skin: Warm and pink; no pallor, cyanosis, clubbing, or bruising. Cath site stable as described above. · HEENT: Symmetric and normocephalic. PERRL. Conjunctiva pink with clear sclera. Mucus membranes moist.   · Cardiovascular: Regular rate and rhythm. S1/S2 present without murmurs, no rubs or gallops. Peripheral pulses 2+, capillary refill < 3 seconds. No elevation of JVP. Trace LLE peripheral edema  · Respiratory: Respirations symmetric and unlabored. Lungs clear to auscultation bilaterally, no wheezing, crackles, or rhonchi  · Gastrointestinal: Abdomen soft and round. Bowel sounds active without tenderness. · Musculoskeletal: Bilateral upper and lower extremity strength with generalized weakness. LLE wound wrapped   · Neurologic/Psych: Awake and orientated to person, place and time. Calm affect, appropriate mood    Pertinent labs, diagnostic, device, and imaging results reviewed as a part of this visit    Labs:    BMP:   Recent Labs     08/15/21  0534 08/16/21  0442 08/17/21  0534   * 134* 134*   K 4.0 3.9 4.0   CL 95* 96* 98*   CO2 28 27 27   BUN 12 14 12   CREATININE 0.8 0.8 0.7     Estimated Creatinine Clearance: 71 mL/min (based on SCr of 0.7 mg/dL).    CBC:   Recent Labs     08/15/21  0533 08/17/21  0535   WBC 4.0 4.6   HGB 10.6* 9.9*   HCT 32.6* 30.7*   MCV 82.6 83.3    269     Thyroid: No results found for: TSH, A9ALYIK, I3DKLUS, THYROIDAB  Lipids:   Lab Results   Component Value Date    CHOL 107 08/11/2021    HDL 34 08/11/2021    HDL 43 07/22/2010    TRIG 97 08/11/2021     LFTS:   Lab Results   Component Value Date    ALT 6 08/14/2021    AST 9 08/14/2021    ALKPHOS 117 08/14/2021    PROT 6.7 08/14/2021    PROT 7.1 03/21/2013    AGRATIO 0.8 08/14/2021    BILITOT 0.6 08/14/2021 Cardiac Enzymes:   Lab Results   Component Value Date    TROPONINI 0.02 2021    TROPONINI 0.03 2021    TROPONINI 0.03 08/10/2021     Coags:   Lab Results   Component Value Date    PROTIME 12.4 2021    INR 1.09 2021       EC/10/21  Sinus rhythm  Septal infarct , age undetermined  Abnormal ECG  When compared with ECG of 2021 20:13,  PVC no longer are present. ECHO: 21  Summary   Normal left ventricle systolic function with an estimated ejection fraction   of 55%. No regional wall motion abnormalities are seen. Normal left ventricular diastolic filling pressure. Mild eccentric aortic regurgitation. Mild mitral and tricuspid regurgitation. Systolic pulmonary artery pressure (SPAP) is normal and estimated at 27 mmHg   (right atrial pressure 3 mmHg). ECHO:  21    Summary   -Left ventricular cavity size is enlarged. LVESD=5.57 cm   -Overall left ventricular systolic function appears mildly reduced.   -Ejection fraction is visually estimated to be 50%. -There is mild hypokinesis of the basal to mid inferior and inferolateral   walls. -Grade II diastolic dysfunction with elevated LV filling pressures.   -The right ventricle is mildly enlarged.   -Right ventricular systolic function is normal.   -Mitral valve leaflets appear mildly thickened. -Mitral regurgitation that may be severe. -The aortic valve leaflets appear mildly thickened. -Mild aortic regurgitation.   -Moderate tricuspid regurgitation with a PASP of 57 mmHg.   -Trivial pulmonic regurgitation. Procedure:  5/3/21  Urgent CABG X 3, with pedicled LIMA to LAD, single Greater Saphenous VG to PV br of RCA, separate single Greater SVG to OM2,  LAAppendage obliteration with 40 mm Atricure LA Clip, CPB, EVH Left Greater Saphenous vein, ABHISHEK, Epiaortic ultrasound, Doppler verification of grafts, Bilateral 5 level intercostal nerve block(Exparel), Platelet gel application. Sternal plating.     Cath: 8/16/21  Findings:  Artery Findings/Result   LM Normal   LAD Mid 100% after large diagonal, distal supplied by LIMA   Cx 99% ostial, distal supplied by R-L collaterals   RI N/A   RCA 70% distal, % prox   S-O occluded   S-PLB patent   L-LAD  patent   LVEDP 8   LVG NA due to contrast      Intervention:         None     Post Cath Dx:       Severe native disease as above  Occluded S-OM  Suspect severe MR related to papillary dysfunction related to occluded distal RCA and Cx  Consider MVR when wound issues resolve if MR does not improved with medical therapy  Would followup with Dr. Bruce Ames    Problem List:   Patient Active Problem List    Diagnosis Date Noted    Acute respiratory failure with hypoxia (HCC)     Chronic obstructive pulmonary disease ([address]     Congestive heart failure of unknown etiology ([address] 08/11/2021    Mild malnutrition ([address] 07/12/2021    Surgical wound dehiscence, initial encounter (at LLE SVG harvest site) 07/12/2021    Acute on chronic diastolic heart failure due to coronary artery disease ([address]     Hx of CABG 07/11/2021    NSTEMI (non-ST elevated myocardial infarction) ([address] 04/23/2021    Lumbar spondylosis 12/21/2020    Pressure ulcer of coccygeal region, stage 4 ([address] 12/21/2020    Myopia of both eyes 02/21/2020    Primary open angle glaucoma (POAG) of both eyes, mild stage 02/21/2020    Type 2 diabetes mellitus with unspecified diabetic retinopathy without macular edema ([address] 02/21/2020    Hypokalemia     Tracheobronchomalacia     Immunocompromised state ([address]     Cylindrical bronchiectasis ([address] 12/19/2018    Osteoporosis 09/25/2018    Primary osteoarthritis of right hip 09/25/2018    History of tobacco use 10/19/2017    Hyponatremia 10/19/2017    Uncontrolled diabetes mellitus ([address] 10/19/2017    Anemia 10/19/2017    Psoriasis     GERD (gastroesophageal reflux disease)     Coronary artery disease 07/03/2017    Uncontrolled type 2 diabetes mellitus with diabetic peripheral angiopathy without gangrene, with long-term current use of insulin [facility] 06/28/2017    Mitral valve insufficiency and aortic valve insufficiency 03/02/2017    Bilateral lower extremity edema 11/17/2015    Other chronic sinusitis 06/16/2014    Mixed hyperlipidemia 12/26/2012    Rheumatoid arthritis ([facility] 01/19/2012    Essential hypertension 12/22/2011        Assessment and Plan:     Coronary artery disease   ~ hx CABG x3 (LIMA-LAD, SVG-RCA, SVG-OM2) 5/3/21. Complicated by sternal hematoma s/p evacuation (5/10/21) and LLE surgical infection s/p debridement, wound vac (7/13/21).   ~ [facility] 8/16/21: severe native disease. Occluded SVG-OM2. Other grafts patent. ~ continue medical management with aspirin, plavix, lopressor, lipitor   ~ stable; denies any chest pain     Acute on Chronic diastolic CHF   ~ Echo 3/3825: EF 55%, no regional WMA  ~ Echo 8/12/21: EF 50%, mild hypokinesis of the basal to mid inferior and inferolateral walls   ~ HF following, appreciate their recommendation     Mitral regurgitation   ~ Echo 4/2021: mild AR and MR  ~ Echo 8/12/21:  EF 50%, mild hypokinesis of the basal to mid inferior and inferolateral walls. Severe MR.   ~ [facility] 8/16 occluded SVG-OM2. Suspect severe MR related to papillary dysfunction related to occluded distal RCA and Cx. Consider MVR when would issues resolve if MR does not improve with medical therapy. NSVT  ~ 18 beats (8/12), symptomatic with c/o palpitations  ~ had 5 beats of wide complex tachycardia this AM, asymptomatic   ~ recheck mag. Continue BB.   ~ consider OP holter monitoring if palpitations persistent. Defer to primary cardiologist.     Pt stable from a cardiac standpoint. Pt will need to follow up as OP with primary cardiologist Dr. Jackelin Galan in 1 mo. Will need f/u with Dr. Jazmyne Bailey. Multiple medical conditions with risk of decompensation. All pertinent information and plan of care discussed with the physician.   All questions and concerns

## 2021-08-17 NOTE — DISCHARGE SUMMARY
40 mg daily, with Aldactone 25 mg daily. 2. Acute hypoxic respiratory failure. Documented O2 sat 82% on room air at [facility]. Most likely secondary to CHF exacerbation. She does not use O2 at home. O2 has been weaned, presently mid 80s on room air. Oxygen study was performed on exertion, and patient did qualify for oxygen with exertion. Home oxygen of 2 L nasal cannula arranged before discharge. 3. Chest discomfort/CAD. Troponin 0.03, trend flat, in setting CHF. Echo with EF 50%, mild hypokinesis of basal to mid inferior and inferolateral walls which are new compared to echo 4/2021. She is s/p CABG 4/2021. Continue asa and statin. 4. Mitral regurgitation. Possibly severe on echo this admission compared to mild on previous (4/2021). Likely contributing factor to CHF. Left heart catheterization was performed 8/16/2021, suspect severe MR related to papillary dysfunction related to occluded distal RCA and CX. Consider MVR when wound issues resolve if MR does not improve with medical therapy. 5. Question of accelerated junctional rhythm on admission EKG. Evaluated by EP, determined to be sinus rhythm. Continue beta blockers, EP has signed off. 6. DM2, controlled. A1c 7.2. Continue home regimen   7. COPD. Not in exacerbation, no change in cough or phlegm, no wheezes. Continue roflumilast and duonebs. 8. Rheumatoid arthritis. Takes prednisone 4 mg daily, continue. 9. NSVT. Noted to have 18 beat run NSVT (8/12), associated with palpitations. Potassium was low at 3.2 and replaced, magnesium 1.7 and replaced. No further significant arrhythmia. Continue to monitor. BMP in am.   10. Dehisced surgical wound left leg/sacral pressure injury stage IV present on admission. Presented with wound vac in place, wound care nurse has been following. Patient stated they felt improved and wanted to go home.   Patient denied chest pain, shortness of breath, palpitations, abdominal pain, nausea vomiting, diarrhea, dysuria, headache lightheadedness or dizziness. Appetite good. Voiding without difficulty. Reviewed plan of care with patient and daughter at bedside, verbalized understanding and agreement. Denied further questions or needs. Physical Exam Performed:     /63   Pulse 80   Temp 97.6 °F (36.4 °C) (Oral)   Resp 18   Ht 5' 10\" (1.778 m)   Wt 131 lb 3.2 oz (59.5 kg)   SpO2 98%   BMI 18.83 kg/m²       General appearance:  No apparent distress, appears stated age and cooperative. HEENT:  Normal cephalic, atraumatic without obvious deformity. Pupils equal, round, and reactive to light. Extra ocular muscles intact. Conjunctivae/corneas clear. Neck: Supple, with full range of motion. No jugular venous distention. Trachea midline. Respiratory: Diminished bilaterally  Cardiovascular:  Regular rate and rhythm with normal S1/S2 without murmurs, rubs or gallops. Abdomen: Soft, non-tender, non-distended with normal bowel sounds. Musculoskeletal:  No clubbing, cyanosis or edema bilaterally. Full range of motion without deformity. Skin: Skin color, texture, turgor normal.  Wound VAC to left leg and sacral wounds  Neurologic:  Neurovascularly intact without any focal sensory/motor deficits. Cranial nerves: II-XII intact, grossly non-focal.  Psychiatric:  Alert and oriented, thought content appropriate, normal insight  Capillary Refill: Brisk,< 3 seconds   Peripheral Pulses: +2 palpable, equal bilaterally       Labs:  For convenience and continuity at follow-up the following most recent labs are provided:      CBC:    Lab Results   Component Value Date    WBC 4.6 08/17/2021    HGB 9.9 08/17/2021    HCT 30.7 08/17/2021     08/17/2021       Renal:    Lab Results   Component Value Date     08/17/2021    K 4.0 08/17/2021    K 4.1 08/10/2021    CL 98 08/17/2021    CO2 27 08/17/2021    BUN 12 08/17/2021    CREATININE 0.7 08/17/2021    CALCIUM 8.8 08/17/2021    PHOS 3.2 08/14/2021 Significant Diagnostic Studies    Radiology:   No orders to display          Consults:     IP CONSULT TO CARDIOLOGY  IP CONSULT TO HEART FAILURE NURSE/COORDINATOR  IP CONSULT TO DIETITIAN  IP CONSULT TO DIABETES EDUCATOR  IP CONSULT TO HOME CARE NEEDS    Disposition: Home    Condition at Discharge: Stable    Discharge Instructions/Follow-up:      Follow up with pcp in 1 week  Follow up with chf team in 1 week  Labs bmp in 1 week  Continue with current wound care and follow up  Follow up with cardiologist to discuss possible mitral valve replacement once wound issue resolves, if mitral regurgitation does not improve with medical therapy    Code Status:  Prior     Activity: activity as tolerated    Diet: cardiac diet      Discharge Medications:     Discharge Medication List as of 8/17/2021  3:40 PM           Details   furosemide (LASIX) 40 MG tablet Take 1 tablet by mouth daily, Disp-30 tablet, R-0Normal      spironolactone (ALDACTONE) 25 MG tablet Take 1 tablet by mouth daily, Disp-30 tablet, R-0Normal      midodrine (PROAMATINE) 2.5 MG tablet Take 1 tablet by mouth 3 times daily (with meals), Disp-90 tablet, R-3Normal              Details   DULoxetine (CYMBALTA) 60 MG extended release capsule Take 60 mg by mouth dailyHistorical Med      DALIRESP 500 MCG tablet TAKE 1 TABLET BY MOUTH DAILY, Disp-30 tablet, R-11Normal      clopidogrel (PLAVIX) 75 MG tablet Take 1 tablet by mouth daily, Disp-30 tablet, R-11Normal      metoprolol tartrate (LOPRESSOR) 25 MG tablet Take 0.5 tablets by mouth 2 times daily Hold this medication for pulse <00 or systolic BP <125, UIYA-09 tablet, R-11Normal      predniSONE (DELTASONE) 1 MG tablet Take 4 mg by mouth dailyHistorical Med      insulin glargine (LANTUS) 100 UNIT/ML injection vial Inject 15 Units into the skin nightly, Disp-1 vial, R-0NO PRINT      oxyCODONE-acetaminophen (PERCOCET)  MG per tablet Take 1 tablet by mouth daily.  Historical Med      docusate sodium (COLACE) 100 MG capsule Take 100 mg by mouth 2 times daily as needed for ConstipationHistorical Med      gabapentin (NEURONTIN) 300 MG capsule Take 300 mg by mouth 2 times daily. Historical Med      latanoprost (XALATAN) 0.005 % ophthalmic solution Place 1 drop into both eyes nightlyHistorical Med      Teriparatide, Recombinant, (FORTEO) 600 MCG/2.4ML SOPN injection Inject 20 mcg into the skin dailyHistorical Med      NARCAN 4 MG/0.1ML LIQD nasal spray as needed , DAWHistorical Med      morphine (MS CONTIN) 15 MG extended release tablet 15 mg 2 times daily. Historical Med      ipratropium (ATROVENT) 0.06 % nasal spray USE 2 SPRAYS BY NASAL ROUTE 2-4 TIMES DAILY, Disp-1 Bottle,R-5Normal      albuterol sulfate  (90 Base) MCG/ACT inhaler INHALE 2 PUFFS INTO THE LUNGS EVERY 4 HOURS AS NEEDED FOR WHEEZING, Disp-1 Inhaler, R-5Normal      vitamin D (CHOLECALCIFEROL) 1000 UNIT TABS tablet Take 5,000 Units by mouth daily Historical Med      calcium carbonate (OSCAL) 500 MG TABS tablet Take 500 mg by mouth dailyHistorical Med      atorvastatin (LIPITOR) 40 MG tablet Take 40 mg by mouthHistorical Med      cyclobenzaprine (FLEXERIL) 10 MG tablet Take 10 mg by mouth 2 times daily as needed Historical Med      Insulin Syringe-Needle U-100 31G X 5/16\" 0.5 ML MISC Historical Med      ACCU-CHEK CEDRICK PLUS strip TEST 4 TIMES DAILY, R-3, DAWHistorical Med      aspirin EC 81 MG EC tablet Take 1 tablet by mouth dailyHistorical Med      insulin lispro (HUMALOG) 100 UNIT/ML injection vial Inject 0-12 Units into the skin 3 times daily (with meals)Historical Med      Misc. Devices (ACAPELLA) MISC PRN Starting 9/2/2015, Until Discontinued, Disp-1 each, R-0, Print      fluticasone (FLONASE) 50 MCG/ACT nasal spray INHALE 2 SPRAYS IN EACH NOSTRIL DAILY, Disp-1 Bottle, R-5      cetirizine (ZYRTEC) 10 MG tablet Take 10 mg by mouth daily.         omeprazole (PRILOSEC) 40 MG capsule Take 40 mg by mouth daily Historical Med             Time Spent on discharge is more than 30 minutes in the examination, evaluation, counseling and review of medications and discharge plan. Signed: LORENZO Hanks CNP   8/18/2021    The patient was seen and examined on day of discharge and this discharge summary is in conjunction with any daily progress note from day of discharge. Thank you Mario Anand MD for the opportunity to be involved in this patient's care. If you have any questions or concerns please feel free to contact me at [phone]. NOTE:  This report was transcribed using voice recognition software. Every effort was made to ensure accuracy; however, inadvertent computerized transcription errors may be present.

## 2021-08-17 NOTE — PLAN OF CARE
Problem: Falls - Risk of:  Goal: Will remain free from falls  Description: Will remain free from falls  Outcome: Met This Shift  Note: Pt is wearing the fall bracelet and yellow nonskid socks. Bed is in lowest position, locked, side rails up 2/4, and call light is within reach. Pt informed of fall risks, verbalizes understanding, and agrees to ask for help to ambulate. Will monitor. Problem: Skin Integrity:  Goal: Will show no infection signs and symptoms  Description: Will show no infection signs and symptoms  Outcome: Met This Shift  Goal: Absence of new skin breakdown  Description: Absence of new skin breakdown  Outcome: Met This Shift  Note: No breakdown noted on this shift. Pt repositions self in bed frequently. Pt continent, has Purewick external catheter, and calls appropriately. Will monitor. Problem: OXYGENATION/RESPIRATORY FUNCTION  Goal: Patient will maintain patent airway  Outcome: Met This Shift  Goal: Patient will achieve/maintain normal respiratory rate/effort  Description: Respiratory rate and effort will be within normal limits for the patient  Outcome: Met This Shift     Problem: Pain:  Goal: Control of chronic pain  Description: Control of chronic pain  Outcome: Ongoing  Note:    Pt c/o pain. Pt assessed for breathing and BP. Pt educated on pain scale. Medication administered, see MAR. Pt repositioned. Stimuli reduced. Rest promoted. Pain reassessed. Will continue to monitor. Problem: HEMODYNAMIC STATUS  Goal: Patient has stable vital signs and fluid balance  Outcome: Ongoing  Note: Trace edema in BLE. Receiving IV Lasix bid. I&O recorded. Pt denies SOB. Intervention: MONITOR PATIENT'S WEIGHT  Note: Daily weights in morning. Problem: Falls - Risk of: Intervention: Assess risk factors for falls  Note: Lopez Fall Risk assessment completed. Intervention: Assess fall prevention measures  Note: Bed alarm active, pt wearing nonskid socks.   Intervention: Implement fall precaution identifier  Note: Pt wearing fall bracelet, yellow nonskid socks, and fall sign is posted. Intervention: Toileting assistance  Note: Pt has Purewick external catheter and calls appropriately for bowel movements. Problem: Skin Integrity:  Intervention: Promote nutritional intake  Note: Pt provided with low calorie high protein supplements. Intervention: Wound cleansing  Note: Wounds irrigated with saline. Intervention: Wound dressing application  Note: Dressings changed to LLE and sacrum, moist to dry per orders. Intervention: Incontinence management  Note: Pt has Purewick external catheter. Intervention: Repositioning  Note: Pt repositioned q2h and PRN. Intervention: Infection prevention measures  Note: Pt in contact isolation, sign on door, and isolation cart in hallway. Intervention: Assess presence of edema. Note: Edema assessed q4h per unit routine.

## 2021-08-17 NOTE — PROGRESS NOTES
US contacted this RN and stated pt needs transvaginal US not pelvic US tomorrow and order should be changed. Please contact [phone] with further questions.

## 2021-08-17 NOTE — PROGRESS NOTES
Shift assessment completed. Pt is a/o X 4. VSS. Medications administered, see MAR. Dressing changed to moist to dry per orders d/t dressing being partially removed. Wound Care RN to replace with wound vac in AM. POC discussed and all questions answered. Pt provided with fresh ice water. Pt has belongings and call light in reach. Bed is locked and in lowest position, bed alarm is active and pt agrees to call for assistance to ambulate. Pt denies further needs, will continue to monitor.

## 2021-08-17 NOTE — PROGRESS NOTES
Home Oxygen Evaluation              O2 sat on room air at rest =95%       O2 sat on room air with exertion = 84%       O2 sat on 1LPM oxygen with exertion = 87%       O2 sat on 2LPM oxygen with exertion = 91%

## 2021-08-17 NOTE — PROGRESS NOTES
Physical Therapy  Facility/Department: [facility] 3A NURSING  Daily Treatment Note  NAME: Irineo Jones  : 1950  MRN: [id number]    Date of Service: 2021    Discharge Recommendations:  Irineo Jones scored a 21/24 on the AM-PAC short mobility form. Current research shows that an AM-PAC score of 18 or greater is typically associated with a discharge to the patient's home setting. Based on the patient's AM-PAC score and their current functional mobility deficits, it is recommended that the patient have 2-3 sessions per week of Physical Therapy at d/c to increase the patient's independence. At this time, this patient demonstrates the endurance and safety to discharge home with home PT and assist from family and a follow up treatment frequency of 2-3x/wk. Please see assessment section for further patient specific details. If patient discharges prior to next session this note will serve as a discharge summary. Please see below for the latest assessment towards goals. HOME HEALTH CARE: LEVEL 1 STANDARD    - Initial home health evaluation to occur within 24-48 hours, in patient home   - Therapy to evaluate with goal of regaining prior level of functioning   - Therapy to evaluate if patient has [id number] needs for personal care    PT Equipment Recommendations  Equipment Needed: No    Assessment   Body structures, Functions, Activity limitations: Decreased functional mobility ; Decreased endurance;Decreased safe awareness;Decreased balance;Decreased ADL status; Decreased posture  Assessment: Pt able to ambulate increased distance this date and requiring less assist. O2 at 94% after gait. Pt plans to have assist upon d/c, recommending home PT.  Pt likely to d/c to today, has all equipment needs  Treatment Diagnosis: Decreased tolerance to activity  Prognosis: Good  PT Education: Transfer Training;General Safety;Gait Training  Patient Education: Pt verbalized understanding  REQUIRES PT FOLLOW UP: Yes  Activity Tolerance  Activity Tolerance: Patient Tolerated treatment well;Patient limited by pain     Patient Diagnosis(es): The encounter diagnosis was Congestive heart failure of unknown etiology ([address] has a past medical history of Atherosclerosis of native artery of right lower extremity with rest pain ([address] Back pain, Branch retinal vein occlusion, Bronchiectasis with acute exacerbation ([address] Cellulitis and abscess of left leg, Cellulitis of left lower extremity, Closed compression fracture of thoracic vertebra ([address] Closed fracture of facial bone with routine healing, Closed jaw fracture ([address] Community acquired pneumonia of left lower lobe of lung, Compression fracture of L1 lumbar vertebra ([address] COPD (chronic obstructive pulmonary disease) ([address] Fracture of tibial plateau, closed, left, initial encounter, Minimally displaced zone I fracture of sacrum (HCC), MRSA (methicillin resistant staph aureus) culture positive, MRSA (methicillin resistant Staphylococcus aureus), Mucus plugging of bronchi, Osteomyelitis of mandible, Osteoporosis with pathological fracture, Post herpetic neuralgia, Proximal humerus fracture, Rheumatoid arthritis ([address] Shingles, Sleep apnea, Status post incision and drainage, Temporal arteritis ([address] Tobacco use, Tracheomalacia, and Vitreous hemorrhage, right eye ([address] has a past surgical history that includes hernia repair; Mandible fracture surgery; Foot surgery; Elbow surgery; Cataract removal; Tubal ligation; Knee arthroscopy; Kyphosis surgery; laminectomy; Spinal fusion; Septoplasty (05/07/2013); Artery surgery (05/30/2013); Upper gastrointestinal endoscopy (04/08/2014); bronchoscopy; bronchoscopy (N/A, 06/12/2019); Mandible fracture surgery (02/2020); back surgery (08/2020); other surgical history (01/08/2021); Pressure ulcer debridement (N/A, 01/08/2021); Artery Biopsy (Right, 03/01/2021); Coronary artery bypass graft (N/A, 05/03/2021);  Mediastinoscopy (N/A, 05/05/2021); Cardiac surgery (05/03/2021); Leg Surgery (Left, 7/13/2021); and IR MIDLINE CATH (7/14/2021). Restrictions  Restrictions/Precautions  Restrictions/Precautions: Fall Risk, General Precautions, Contact Precautions  Position Activity Restriction  Other position/activity restrictions: Abril Curtis is a 72 y.o. female for shortness of breath. Onset was yesterday. Context includes family reports the patient has had some increasing shortness of breath for a while however yesterday became more severe. Patient does not use oxygen at home. Patient does follow with pulmonary. Family reports they did a chest x-ray and the patient was sent to the ED to be evaluated. Subjective   General  Chart Reviewed: Yes  Subjective  Subjective: Pt agreeable to PT tx  General Comment  Comments: Pt sidelying in bed  Pain Screening  Patient Currently in Pain: Yes  Pain Assessment  Pain Assessment: 0-10  Pain Level: 6  Pain Type: Chronic pain  Pain Location: Back  Pain Orientation: Lower  Pain Descriptors: Aching  Clinical Progression: Not changed  Vital Signs  Patient Currently in Pain: Yes       Orientation  Orientation  Overall Orientation Status: Within Normal Limits  Cognition      Objective   Bed mobility  Rolling to Right: Independent  Supine to Sit: Independent  Sit to Supine: Independent  Scooting: Independent  Transfers  Sit to Stand: Stand by assistance  Stand to sit: Stand by assistance  Comment: no issues with transfers from bed  Ambulation  Ambulation?: Yes  Ambulation 1  Surface: level tile  Device: Rolling Walker  Assistance: Stand by assistance;Contact guard assistance  Quality of Gait: decrease step length and daja, forward flexed trunk sharmaine knee flexion  Distance: 40' x3  Comments: heavy use of SHARMAINE UE on RW. O2 sats at 94% after walk.  Pt not wanting to walk further distance than 40' stating it wore her out and she was hoping to d/c to home today  Stairs/Curb  Stairs?: No     Balance  Posture: Good  Sitting - Static: Good  Sitting - Dynamic: Good  Standing - Static: Fair  Standing - Dynamic: Fair  Comments: static stance without UE support while RN looked at wound on pt buttock. PT assisted pt with donning depends  Exercises  Hip Flexion: marches in standing x 10  Ankle Pumps: heel raises and toe raiss in standing smith x 10 at Rw  Comments: minsquats in standing smith x 10                                                                       AM-PAC Score  AM-PAC Inpatient Mobility Raw Score : 21 (08/17/21 1132)  AM-PAC Inpatient T-Scale Score : 50.25 (08/17/21 1132)  Mobility Inpatient CMS 0-100% Score: 28.97 (08/17/21 1132)  Mobility Inpatient CMS G-Code Modifier : CJ (08/17/21 1132)          Goals  Short term goals  Time Frame for Short term goals: Before discharge  Short term goal 1: Pt will complete bed mobility Mod I -- goal met 8/13  Short term goal 2: Pt will complete sit<>stand with SBA -goal met 8/17/21  Short term goal 3: Pt will ambulate 50 ft with RW and SBA- ongoing  Patient Goals   Patient goals : Go home, but I may have to go to my daughter's house    Plan    Plan  Times per week: 3-5x  Current Treatment Recommendations: Strengthening, Transfer Training, Balance Training, Endurance Training, Gait Training, Functional Mobility Training, Neuromuscular Re-education, Safety Education & Training, Patient/Caregiver Education & Training, Equipment Evaluation, Education, & procurement, Home Exercise Program  Safety Devices  Type of devices:  All fall risk precautions in place, Call light within reach, Bed alarm in place, Patient at risk for falls, Gait belt, Left in bed  Restraints  Initially in place: No     Therapy Time   Individual Concurrent Group Co-treatment   Time In 1056         Time Out 1124         Minutes 28         Timed Code Treatment Minutes: [address] [facility], [address] [place] [address]

## 2021-08-17 NOTE — PROGRESS NOTES
[facility]  HEART FAILURE  Progress Note      Admit Date 8/11/2021     Reason for Consult:      Reason for Consultation/Chief Complaint: SOB    HPI:    Consuelo Yin is a 72 y.o. female with PMH CAD, MI, CABG 5/3/21, DANIELLE, COPD, RA, HFpEF, DM admiteed with increased SOB, orthopnea, and edema. In the ED, she was hypoxic with oxygen saturation of 82% on room air, requiring 4 liters of oxygen to maintain sats above 90%. Chest Xray shows pulmonary vascular congestion and bibasilar pleural effusion. BNP elevated at 20K. Troponin 0.03, neither increasing or decreasing. EKG did not show acute ST changes, echo with mild hypokinesis of basal to mid inf and inferolateal walls and has LHC yesterday with no intervention,severe MR      Subjective:  Patient is being seen for CHF. There were no acute overnight cardiac events. Today Ms. Taveras denies chest pain or palpitations, or SOB over night. Feels back to baseline and is anxious to go home.      Baseline Weight:    Wt Readings from Last 3 Encounters:   08/17/21 131 lb 3.2 oz (59.5 kg)   08/10/21 155 lb (70.3 kg)   08/10/21 155 lb (70.3 kg)          NYHA Class III  Objective:   /65   Pulse 80   Temp 97.9 °F (36.6 °C) (Oral)   Resp 18   Ht 5' 10\" (1.778 m)   Wt 131 lb 3.2 oz (59.5 kg)   SpO2 96%   BMI 18.83 kg/m²       Intake/Output Summary (Last 24 hours) at 8/17/2021 0916  Last data filed at 8/17/2021 0045  Gross per 24 hour   Intake 10 ml   Output 1250 ml   Net -1240 ml      Wt Readings from Last 3 Encounters:   08/17/21 131 lb 3.2 oz (59.5 kg)   08/10/21 155 lb (70.3 kg)   08/10/21 155 lb (70.3 kg)      In: 10 [I.V.:10]  Out: 900       Physical Exam:  General Appearance:  Non-obese/Well Nourished  Respiratory:  · Resp Auscultation: Normal breath sounds without dullness  Cardiovascular:  · Auscultation: Regular rate and rhythm, normal S1S2, +murmur  · Palpation: Normal    · Pedal Pulses: 2+ and equal   Abdomen:  · Soft, NT, ND, + bs  Extremities:  · No Cyanosis or Clubbing  · Extremities: trace edema  Neurological/Psychiatric:  · Oriented to time, place, and person  · Non-anxious    MEDICATIONS:   Scheduled Meds:   Scheduled Meds:   potassium chloride  20 mEq Oral Daily with breakfast    furosemide  20 mg Intravenous BID    insulin lispro  0-12 Units Subcutaneous TID WC    insulin lispro  0-6 Units Subcutaneous Nightly    docusate sodium  100 mg Oral BID    potassium chloride  40 mEq Oral Once    spironolactone  25 mg Oral Daily    midodrine  2.5 mg Oral TID WC    ipratropium-albuterol  1 ampule Inhalation Q4H WA    aspirin EC  81 mg Oral Daily    atorvastatin  40 mg Oral Nightly    calcium elemental  500 mg Oral Daily    cetirizine  10 mg Oral Daily    clopidogrel  75 mg Oral Daily    Roflumilast  500 mcg Oral Daily    DULoxetine  60 mg Oral Daily    fluticasone  2 spray Each Nostril Daily    insulin glargine  15 Units Subcutaneous Nightly    latanoprost  1 drop Both Eyes Nightly    metoprolol tartrate  12.5 mg Oral BID    pantoprazole  40 mg Oral QAM AC    predniSONE  4 mg Oral Daily    vitamin D3  5,000 Units Oral Daily    sodium chloride flush  5-40 mL Intravenous 2 times per day    enoxaparin  40 mg Subcutaneous Daily    morphine  15 mg Oral 2 times per day     Continuous Infusions:   dextrose      sodium chloride Stopped (08/15/21 0404)     PRN Meds:.lip balm petroleum free, potassium chloride **OR** potassium alternative oral replacement **OR** potassium chloride, albuterol sulfate HFA, cyclobenzaprine, glucose, dextrose, glucagon (rDNA), dextrose, sodium chloride flush, sodium chloride, ondansetron **OR** ondansetron, polyethylene glycol, acetaminophen **OR** acetaminophen, perflutren lipid microspheres, oxyCODONE-acetaminophen  Continuous Infusions:   dextrose      sodium chloride Stopped (08/15/21 0404)       Intake/Output Summary (Last 24 hours) at 8/17/2021 0916  Last data filed at 8/17/2021 0045  Gross per 24 hour   Intake 10 ml   Output 1250 ml   Net -1240 ml       Lab Data:  CBC:   Lab Results   Component Value Date    WBC 4.6 08/17/2021    HGB 9.9 08/17/2021     08/17/2021     BMP:  Lab Results   Component Value Date     08/17/2021    K 4.0 08/17/2021    K 4.1 08/10/2021    CL 98 08/17/2021    CO2 27 08/17/2021    BUN 12 08/17/2021    CREATININE 0.7 08/17/2021    GLUCOSE 71 08/17/2021     INR:   Lab Results   Component Value Date    INR 1.09 07/14/2021    INR 1.04 05/04/2021    INR 1.42 05/03/2021        CARDIAC LABS  ENZYMES:No results for input(s): CKMB, CKMBINDEX, TROPONINI in the last 72 hours. Invalid input(s): CKTOTAL;3  FASTING LIPID PANEL:  Lab Results   Component Value Date    HDL 34 08/11/2021    HDL 43 07/22/2010    LDLCALC 54 08/11/2021    TRIG 97 08/11/2021     LIVER PROFILE:  Lab Results   Component Value Date    AST 9 08/14/2021    AST 10 08/13/2021    ALT 6 08/14/2021    ALT 6 08/13/2021     BNP:   Lab Results   Component Value Date    PROBNP 5,729 08/17/2021    PROBNP 9,814 08/15/2021    PROBNP 6,845 08/13/2021     Iron Studies:    Lab Results   Component Value Date    FERRITIN 99.8 06/28/2020     Lab Results   Component Value Date    IRON 34 (L) 08/11/2021    TIBC 236 (L) 08/11/2021    FERRITIN 99.8 06/28/2020      Iron Deficiency Anemia:  Yes IV Iron Therapy:  Yes, inpt 8/2021  2017 ACC/AHA HF Guidelines:   intravenous iron replacement in patients with New York Heart Association (NYHA) class II and III HF and iron deficiency(ferritin <100 ng/ml or 100-300 ng/ml if transferrin saturation <20%), to improve functional status and QoL. 1. WEIGHT: Admit Weight: 150 lb 8 oz (68.3 kg)      Today  Weight: 131 lb 3.2 oz (59.5 kg)   2.  I/O     Intake/Output Summary (Last 24 hours) at 8/17/2021 0916  Last data filed at 8/17/2021 0045  Gross per 24 hour   Intake 10 ml   Output 1250 ml   Net -1240 ml       Cardiac Testing: [facility] 8/16/21:   Findings:  Artery Findings/Result continued, continue midodrine  4. Anemia- s/p venofer  5. MR- per IC: Suspect severe MR related to papillary dysfunction related to occluded distal RCA and Cx.   Consider MVR when wound issues resolve if MR does not improved with medical therapy      I appreciate the opportunity of cooperating in the care of this individual.    Michelle Quick, APRN - CNP, ACNP, [address] 8/17/2021, 9:16 AM  Heart Failure  The [address], [facility]  Ph: [phone]      Core Measures:   · Discharge instructions:   · LVEF documented:   · ACEI for LV dysfunction:   · Smoking Cessation:

## 2021-08-17 NOTE — PROGRESS NOTES
Data- discharge order received, pt verbalized agreement to discharge, needs for [facility] with [facility] for wound care and/or new Durable Medical Equipment through [employer]  for home O2, [employer] reviewed and signed by MD, to be completed by RN. Action- AVS prepared, discharge instructions prepared and given to pt and family member, medication information packet given r/t NEW or CHANGED prescriptions, pt verbalized understanding further self-review. D/C instruction summary: Diet- carb control, Activity- up as tolerated, follow up with Primary Care Physician Sana Alvarez -[phone] appointment in one week,  medications prescriptions to be filled at pharmacy of choice. Response- Case Management/ reported faxing completed [employer] and AVS to needed HHC/DME services stated above. Pt belongings gathered, IV removed, pt dressed appropriatly. Disposition is home with HHC/DME as stated above, transported with belongings, dc instructions, home oxygen, taken to lobby via w/c with RN, no complications. 1. WEIGHT: Admit Weight: 150 lb 8 oz (68.3 kg) (08/11/21 0409)        Today  Weight: 131 lb 3.2 oz (59.5 kg) (08/17/21 0400)       2.  O2 SAT.: SpO2: 98 % (08/17/21 1242)

## 2021-08-17 NOTE — CARE COORDINATION
OMAR spoke with Nurse Santos with [facility] agency - [phone]  about patient discharge. Stated patient was active with them before admission to the hospital. Will resume the services which  includes  wound vac care. Patient has a would vac that she came with from home. She stated she has access to Epic no need faxing the orders.

## 2021-08-18 ENCOUNTER — TELEPHONE (OUTPATIENT)
Dept: WOUND CARE | Age: 71
End: 2021-08-18

## 2021-08-18 ENCOUNTER — HOSPITAL ENCOUNTER (OUTPATIENT)
Dept: WOUND CARE | Age: 71
Discharge: HOME | End: 2021-08-18
Payer: MEDICARE

## 2021-08-18 DIAGNOSIS — L89.154 PRESSURE ULCER OF COCCYGEAL REGION, STAGE 4 (HCC): ICD-10-CM

## 2021-08-18 DIAGNOSIS — T81.31XA SURGICAL WOUND DEHISCENCE, INITIAL ENCOUNTER: ICD-10-CM

## 2021-08-20 ENCOUNTER — TELEPHONE (OUTPATIENT)
Dept: OTHER | Age: 71
End: 2021-08-20

## 2021-08-20 ENCOUNTER — OFFICE VISIT (OUTPATIENT)
Dept: CARDIOLOGY CLINIC | Age: 71
End: 2021-08-20
Payer: MEDICARE

## 2021-08-20 VITALS
HEART RATE: 80 BPM | HEIGHT: 70 IN | OXYGEN SATURATION: 95 % | BODY MASS INDEX: 19.9 KG/M2 | SYSTOLIC BLOOD PRESSURE: 124 MMHG | WEIGHT: 139 LBS | DIASTOLIC BLOOD PRESSURE: 56 MMHG

## 2021-08-20 DIAGNOSIS — I34.0 NONRHEUMATIC MITRAL VALVE REGURGITATION: Primary | ICD-10-CM

## 2021-08-20 DIAGNOSIS — I50.32 CHRONIC DIASTOLIC (CONGESTIVE) HEART FAILURE (HCC): ICD-10-CM

## 2021-08-20 DIAGNOSIS — I10 ESSENTIAL HYPERTENSION: ICD-10-CM

## 2021-08-20 DIAGNOSIS — I25.10 CORONARY ARTERY DISEASE INVOLVING NATIVE CORONARY ARTERY OF NATIVE HEART WITHOUT ANGINA PECTORIS: ICD-10-CM

## 2021-08-20 PROCEDURE — 99214 OFFICE O/P EST MOD 30 MIN: CPT | Performed by: NURSE PRACTITIONER

## 2021-08-20 NOTE — PROGRESS NOTES
[address]   Cardiology Note              Date:  August 20, 2021  Patientname: Sandra Joseph  YOB: 1950    Primary Care physician: Gil Barajas MD    HISTORY OF PRESENT ILLNESS: Sandra Joseph is a 72 y.o. female with a history of CAD, CHF, MR, PVD, DM, DANIELLE, sacral ulcer, COPD, RA. She had right fem-pop bypass 9/2017. She was admitted 4/2021 for chest pain and troponin elevated. [facility]  4/26/2021 showed severe CAD. Echo showed EF 55%. On 5/3/2021 she had CABG x3, BIMAL clip. She required evacuation of hematoma 5/5/2021. She was admitted 7/2021 for cellulitis at vein harvest site requiring I&D on 7/13/2021. She was admitted 8/10/2021 for shortness of breath and treated for CHF. Echo showed EF 50%, possibly severe MR. [facility] 8/16/2021 showed occluded SVG-OM, suspect severe MR due to papillary dysfunction r/t occluded RCA/LCx. Today she presents for hospital follow up for CHF, MR. She feels much better. Shortness of breath and edema have improved. She has no chest pain, dizziness. Lightheadedness. She has rare palpitations.  She appears to have gained weight since discharge though she states weight has been 137 lbs on home scale since leaving hospital.     Office weight today 8/20/2021: 139 lbs (137 lbs at home)  Discharge weight 8/17/2021: 131 lbs    Past Medical History:   has a past medical history of Atherosclerosis of native artery of right lower extremity with rest pain ([address] Back pain, Branch retinal vein occlusion, Bronchiectasis with acute exacerbation ([address] Cellulitis and abscess of left leg, Cellulitis of left lower extremity, Closed compression fracture of thoracic vertebra ([address] Closed fracture of facial bone with routine healing, Closed jaw fracture ([address] Community acquired pneumonia of left lower lobe of lung, Compression fracture of L1 lumbar vertebra ([address] COPD (chronic obstructive pulmonary disease) ([address] Fracture of tibial plateau, closed, left, initial encounter, Minimally displaced zone I fracture of sacrum (HCC), MRSA (methicillin resistant staph aureus) culture positive, MRSA (methicillin resistant Staphylococcus aureus), Mucus plugging of bronchi, Osteomyelitis of mandible, Osteoporosis with pathological fracture, Post herpetic neuralgia, Proximal humerus fracture, Rheumatoid arthritis ([address] Shingles, Sleep apnea, Status post incision and drainage, Temporal arteritis ([address] Tobacco use, Tracheomalacia, and Vitreous hemorrhage, right eye ([address] Past Surgical History:   has a past surgical history that includes hernia repair; Mandible fracture surgery; Foot surgery; Elbow surgery; Cataract removal; Tubal ligation; Knee arthroscopy; Kyphosis surgery; laminectomy; Spinal fusion; Septoplasty (05/07/2013); Artery surgery (05/30/2013); Upper gastrointestinal endoscopy (04/08/2014); bronchoscopy; bronchoscopy (N/A, 06/12/2019); Mandible fracture surgery (02/2020); back surgery (08/2020); other surgical history (01/08/2021); Pressure ulcer debridement (N/A, 01/08/2021); Artery Biopsy (Right, 03/01/2021); Coronary artery bypass graft (N/A, 05/03/2021); Mediastinoscopy (N/A, 05/05/2021); Cardiac surgery (05/03/2021); Leg Surgery (Left, 7/13/2021); and IR MIDLINE CATH (7/14/2021). Home Medications:    Prior to Admission medications    Medication Sig Start Date End Date Taking?  Authorizing Provider   furosemide (LASIX) 40 MG tablet Take 1 tablet by mouth daily 8/17/21   LORENZO Moore - CNP   spironolactone (ALDACTONE) 25 MG tablet Take 1 tablet by mouth daily 8/18/21   LORENZO Moore CNP   midodrine (PROAMATINE) 2.5 MG tablet Take 1 tablet by mouth 3 times daily (with meals) 8/17/21   LORENZO Moore - CNP   DULoxetine (CYMBALTA) 60 MG extended release capsule Take 60 mg by mouth daily    Historical Provider, MD   DALIRESP 500 MCG tablet TAKE 1 TABLET BY MOUTH DAILY 6/23/21   Chris Thomas MD   clopidogrel (PLAVIX) 75 MG tablet Take 1 tablet by mouth daily 6/10/21   Azael Calzada MD   metoprolol tartrate (LOPRESSOR) 25 MG tablet Take 0.5 tablets by mouth 2 times daily Hold this medication for pulse <15 or systolic BP <257 1/66/60   Azael Calzada MD   predniSONE (DELTASONE) 1 MG tablet Take 4 mg by mouth daily    Historical Provider, MD   insulin glargine (LANTUS) 100 UNIT/ML injection vial Inject 15 Units into the skin nightly 5/10/21   LORENZO العراقي - VERNON   oxyCODONE-acetaminophen (PERCOCET)  MG per tablet Take 1 tablet by mouth daily. Historical Provider, MD   docusate sodium (COLACE) 100 MG capsule Take 100 mg by mouth 2 times daily as needed for Constipation    Historical Provider, MD   gabapentin (NEURONTIN) 300 MG capsule Take 300 mg by mouth 2 times daily. Historical Provider, MD   latanoprost (XALATAN) 0.005 % ophthalmic solution Place 1 drop into both eyes nightly    Historical Provider, MD   Teriparatide, Recombinant, (FORTEO) 600 MCG/2.4ML SOPN injection Inject 20 mcg into the skin daily    Historical Provider, MD   NARCAN 4 MG/0.1ML LIQD nasal spray as needed  10/20/20   Historical Provider, MD   morphine (MS CONTIN) 15 MG extended release tablet 15 mg 2 times daily.   10/16/20   Historical Provider, MD   ipratropium (ATROVENT) 0.06 % nasal spray USE 2 SPRAYS BY NASAL ROUTE 2-4 TIMES DAILY 10/29/20   Loretta Cash MD   albuterol sulfate  (90 Base) MCG/ACT inhaler INHALE 2 PUFFS INTO THE LUNGS EVERY 4 HOURS AS NEEDED FOR WHEEZING 1/20/20   Benito Nunn MD   vitamin D (CHOLECALCIFEROL) 1000 UNIT TABS tablet Take 5,000 Units by mouth daily     Historical Provider, MD   calcium carbonate (OSCAL) 500 MG TABS tablet Take 500 mg by mouth daily    Historical Provider, MD   atorvastatin (LIPITOR) 40 MG tablet Take 40 mg by mouth 10/16/18   Historical Provider, MD   cyclobenzaprine (FLEXERIL) 10 MG tablet Take 10 mg by mouth 2 times daily as needed     Historical Provider, MD   Insulin Syringe-Needle U-100 31G X 5/16\" 0.5 ML [address] USE 5 TIMES DAILY 5/30/18   Historical Provider, MD   [address] [facility] strip TEST 4 TIMES DAILY 6/1/18   Historical Provider, MD   aspirin EC 81 MG EC tablet Take 1 tablet by mouth daily 10/23/17   Sherlyn Young MD   insulin lispro (HUMALOG) 100 UNIT/ML injection vial Inject 0-12 Units into the skin 3 times daily (with meals) 10/23/17   Sherlyn Young MD   Misc. Devices (ACAPELLA) MISC Take 1 Device by mouth as needed 9/2/15   Eligio Phelps, APRN - CNP   fluticasone [facility]) 50 MCG/ACT nasal spray INHALE 2 SPRAYS IN [facility] NOSTRIL DAILY 11/25/13   Yfn Mo MD   cetirizine (ZYRTEC) 10 MG tablet Take 10 mg by mouth daily. Historical Provider, MD   omeprazole (PRILOSEC) 40 MG capsule Take 40 mg by mouth daily     Historical Provider, MD     Allergies:  Atenolol    Social History:   reports that she quit smoking about 30 years ago. Her smoking use included cigarettes. She has a 20.00 pack-year smoking history. She has never used smokeless tobacco. She reports previous alcohol use. She reports that she does not use drugs. Family History: family history includes Asthma in an other family member; Diabetes in her brother, mother, and sister; Heart Disease in her brother and brother; Hypertension in her mother. Review of Systems   Review of Systems   Constitutional: Negative. Respiratory: Negative. Cardiovascular: Positive for palpitations and leg swelling. Negative for chest pain. Neurological: Negative.       OBJECTIVE:    Vital signs:    BP (!) 124/56   Pulse 80   Ht 5' 9.5\" (1.765 m)   Wt 139 lb (63 kg)   SpO2 95%   BMI 20.23 kg/m²      Physical Exam:  Constitutional:  Comfortable and alert, NAD, appears older than stated age, frail  Eyes: PERRL, sclera nonicteric  Neck:  Supple, no masses, no thyroidmegaly, JVP <8  Skin:  Warm and dry; no rash or lesions; +sternal incision  Heart: Regular, normal apex, S1 and S2 normal, no M/G/R  Lungs:  Normal respiratory effort; clear; no wheezing/rhonchi/rales  Abdomen: soft, non tender, + bowel sounds  Extremities:  LLE wrapped, + edema, trace RLE edema  Neuro: alert and oriented, moves legs and arms equally, normal mood and affect    Data Reviewed:      Echo 8/12/2021:  -Left ventricular cavity size is enlarged. LVESD=5.57 cm   -Overall left ventricular systolic function appears mildly reduced.   -Ejection fraction is visually estimated to be 50%. -There is mild hypokinesis of the basal to mid inferior and inferolateral   walls. -Grade II diastolic dysfunction with elevated LV filling pressures.   -The right ventricle is mildly enlarged.   -Right ventricular systolic function is normal.   -Mitral valve leaflets appear mildly thickened. -Mitral regurgitation that may be severe. -The aortic valve leaflets appear mildly thickened. -Mild aortic regurgitation.   -Moderate tricuspid regurgitation with a PASP of 57 mmHg.   -Trivial pulmonic regurgitation. Coronary angiogram 8/16/2021:  Findings:  Artery Findings/Result   LM Normal   LAD Mid 100% after large diagonal, distal supplied by LIMA   Cx 99% ostial, distal supplied by R-L collaterals   RI N/A   RCA 70% distal, % prox   S-O occluded   S-PLB patent   L-LAD  patent   LVEDP 8   LVG NA due to contrast    Intervention:         None   Post Cath Dx:       Severe native disease as above  Occluded S-OM  Suspect severe MR related to papillary dysfunction related to occluded distal RCA and Cx  Consider MVR when wound issues resolve if MR does not improved with medical therapy  Would followup with Dr. Tavia Ochoa    CABG 5/3/2021:  Urgent CABG X 3, with pedicled LIMA to LAD, single Greater Saphenous VG to PV br of RCA, separate single Greater SVG to OM2,  LAAppendage obliteration with 40 mm Atricure LA Clip, CPB, EVH Left Greater Saphenous vein, ABHISHEK, Epiaortic ultrasound, Doppler verification of grafts, Bilateral 5 level intercostal nerve block(Exparel), Platelet gel application.   Sternal plating. Echo 4/24/2021:  Normal left ventricle systolic function with an estimated ejection fraction   of 55%. No regional wall motion abnormalities are seen. Normal left ventricular diastolic filling pressure. Mild eccentric aortic regurgitation. Mild mitral and tricuspid regurgitation. Systolic pulmonary artery pressure (SPAP) is normal and estimated at 27 mmHg   (right atrial pressure 3 mmHg). Cardiology Labs Reviewed:   CBC: No results for input(s): WBC, HGB, HCT, PLT in the last 72 hours. BMP:No results for input(s): NA, K, CO2, BUN, CREATININE, LABGLOM, GLUCOSE in the last 72 hours. PT/INR: No results for input(s): PROTIME, INR in the last 72 hours. APTT:No results for input(s): APTT in the last 72 hours. FASTING LIPID PANEL:  Lab Results   Component Value Date    HDL 34 08/11/2021    HDL 43 07/22/2010    LDLCALC 54 08/11/2021    TRIG 97 08/11/2021     LIVER PROFILE:No results for input(s): AST, ALT, ALB in the last 72 hours. BNP:   Lab Results   Component Value Date    PROBNP 5,729 08/17/2021    PROBNP 9,814 08/15/2021    PROBNP 6,845 08/13/2021    PROBNP 20,636 08/10/2021    PROBNP 15,026 07/11/2021     Reviewed all labs and imaging today    Assessment:   Chronic diastolic CHF: appears compensated though does have mild weight gain  Mitral regurgitation: severe on echo 8/2021; likely related to papillary dysfunction/occluded dRCA/LCx  CAD: no current angina; severe native disease and occluded SVG-OM on [facility] 8/16/2021: s/p CABG x3 and BIMAL clip 5/3/2021  HTN: stable  History of hypotension: noted during hospitalization 8/2021, on midodrine  HLD: controlled, LDL 54, statin  DM: hgb a1c 7.2  DANIELLE  COPD  Tracheomalacia  Anemia  RA  LLE cellulitis: s/p I&D 7/13/2021 by Dr. Shelly Dominguez  Chronic sacral decubitus ulcer: follows with wound clinic    Plan:   1. Discussed with Dr. Mark Bynum. Continue medical therapy for CHF and reassess MR. If MR remains severe, will refer for mitral clip.   2. Check BMP and BNP  3. Stop midodrine  4. Plan on adding ARB pending renal function and BP  5. Continue aspirin, statin, plavix, metoprolol, lasix, spironolactone   6. Check BP/weight at home and call the office if consistently out of goal range  7. Cardiac rehab  8.  Follow up as planned 9/28/2021    LORENZO Stockton-CNP  [address]  [phone]

## 2021-08-20 NOTE — TELEPHONE ENCOUNTER
[address] FAILURE PROGRAM  TELEPHONE ENCOUNTER FORM    Cathy Taveras 1950    Attempted to call patient for HF follow-up. No answer at this time.       Next MD/ Clinic appointment: Patient was seen today by cardiology      Jena SWEENEY RN 8/20/2021 3:17 PM

## 2021-08-20 NOTE — PATIENT INSTRUCTIONS
Continue current medications  Will discuss with Dr. Dionna Penn and call with any changes  Follow up with Dr. Lisa Powers

## 2021-08-20 NOTE — LETTER
[address] Cardiology - [address] [facility]  Phone: [phone]  Fax: [phone] [facility], APRN - CNP      August 24, 2021     Patient: Kendrick Lawson   MR Number: [id number]   YOB: 1950   Date of Visit: 8/20/2021       Dear Dr. Alber Chew ref. provider found: Thank you for referring Stephane Claudio to me for evaluation/treatment. Below are the relevant portions of my assessment and plan of care. If you have questions, please do not hesitate to call me. I look forward to following Kelin Qureshi along with you. Sincerely,        Mitzi Mendez, LORENZO - CNP    CC providers:  Tresa Fowler MD  [phone]  Via Fax: [phone]     [facility], [place], operated by [facility]   Cardiology Note              Date:  August 20, 2021  Patientname: Kendrick Lawson  YOB: 1950    Primary Care physician: Laura Fagan MD    HISTORY OF PRESENT ILLNESS: Kendrick Lawson is a 72 y.o. female with a history of CAD, CHF, MR, PVD, DM, DANIELLE, sacral ulcer, COPD, RA. She had right fem-pop bypass 9/2017. She was admitted 4/2021 for chest pain and troponin elevated. [facility]  4/26/2021 showed severe CAD. Echo showed EF 55%. On 5/3/2021 she had CABG x3, BIMAL clip. She required evacuation of hematoma 5/5/2021. She was admitted 7/2021 for cellulitis at vein harvest site requiring I&D on 7/13/2021. She was admitted 8/10/2021 for shortness of breath and treated for CHF. Echo showed EF 50%, possibly severe MRTabitha [facility] 8/16/2021 showed occluded SVG-OM, suspect severe MR due to papillary dysfunction r/t occluded RCA/LCx. Today she presents for hospital follow up for CHF, MR. She feels much better. Shortness of breath and edema have improved. She has no chest pain, dizziness. Lightheadedness. She has rare palpitations.  She appears to have gained weight since discharge though she states weight has been 137 lbs on home scale since leaving hospital.     Office weight today 8/20/2021: 139 lbs (137 lbs at home)  Discharge weight 8/17/2021: 131 lbs    Past Medical History:   has a past medical history of Atherosclerosis of native artery of right lower extremity with rest pain ([address] Back pain, Branch retinal vein occlusion, Bronchiectasis with acute exacerbation ([address] Cellulitis and abscess of left leg, Cellulitis of left lower extremity, Closed compression fracture of thoracic vertebra ([address] Closed fracture of facial bone with routine healing, Closed jaw fracture ([address] Community acquired pneumonia of left lower lobe of lung, Compression fracture of L1 lumbar vertebra ([address] COPD (chronic obstructive pulmonary disease) ([address] Fracture of tibial plateau, closed, left, initial encounter, Minimally displaced zone I fracture of sacrum ([address] MRSA (methicillin resistant staph aureus) culture positive, MRSA (methicillin resistant Staphylococcus aureus), Mucus plugging of bronchi, Osteomyelitis of mandible, Osteoporosis with pathological fracture, Post herpetic neuralgia, Proximal humerus fracture, Rheumatoid arthritis ([address] Shingles, Sleep apnea, Status post incision and drainage, Temporal arteritis ([address] Tobacco use, Tracheomalacia, and Vitreous hemorrhage, right eye ([address] Past Surgical History:   has a past surgical history that includes hernia repair; Mandible fracture surgery; Foot surgery; Elbow surgery; Cataract removal; Tubal ligation; Knee arthroscopy; Kyphosis surgery; laminectomy; Spinal fusion; Septoplasty (05/07/2013); Artery surgery (05/30/2013); Upper gastrointestinal endoscopy (04/08/2014); bronchoscopy; bronchoscopy (N/A, 06/12/2019); Mandible fracture surgery (02/2020); back surgery (08/2020); other surgical history (01/08/2021); Pressure ulcer debridement (N/A, 01/08/2021); Artery Biopsy (Right, 03/01/2021); Coronary artery bypass graft (N/A, 05/03/2021); Mediastinoscopy (N/A, 05/05/2021); Cardiac surgery (05/03/2021);  Leg Surgery (Left, 7/13/2021); and IR MIDLINE CATH (7/14/2021). Home Medications:    Prior to Admission medications    Medication Sig Start Date End Date Taking? Authorizing Provider   furosemide (LASIX) 40 MG tablet Take 1 tablet by mouth daily 8/17/21   LORENZO Álvarez CNP   spironolactone (ALDACTONE) 25 MG tablet Take 1 tablet by mouth daily 8/18/21   LORENZO Álvarez CNP   midodrine (PROAMATINE) 2.5 MG tablet Take 1 tablet by mouth 3 times daily (with meals) 8/17/21   LORENZO Álvarez CNP   DULoxetine (CYMBALTA) 60 MG extended release capsule Take 60 mg by mouth daily    Historical Provider, MD   DALIRESP 500 MCG tablet TAKE 1 TABLET BY MOUTH DAILY 6/23/21   Bia Barrett MD   clopidogrel (PLAVIX) 75 MG tablet Take 1 tablet by mouth daily 6/10/21   Cassie Walsh MD   metoprolol tartrate (LOPRESSOR) 25 MG tablet Take 0.5 tablets by mouth 2 times daily Hold this medication for pulse <75 or systolic BP <037 1/95/94   Cassie Walsh MD   predniSONE (DELTASONE) 1 MG tablet Take 4 mg by mouth daily    Historical Provider, MD   insulin glargine (LANTUS) 100 UNIT/ML injection vial Inject 15 Units into the skin nightly 5/10/21   LORENZO Ba CNP   oxyCODONE-acetaminophen (PERCOCET)  MG per tablet Take 1 tablet by mouth daily. Historical Provider, MD   docusate sodium (COLACE) 100 MG capsule Take 100 mg by mouth 2 times daily as needed for Constipation    Historical Provider, MD   gabapentin (NEURONTIN) 300 MG capsule Take 300 mg by mouth 2 times daily. Historical Provider, MD   latanoprost (XALATAN) 0.005 % ophthalmic solution Place 1 drop into both eyes nightly    Historical Provider, MD   Teriparatide, Recombinant, (FORTEO) 600 MCG/2.4ML SOPN injection Inject 20 mcg into the skin daily    Historical Provider, MD   NARCAN 4 MG/0.1ML LIQD nasal spray as needed  10/20/20   Historical Provider, MD   morphine (MS CONTIN) 15 MG extended release tablet 15 mg 2 times daily.   10/16/20   Historical brother and brother; Hypertension in her mother. Review of Systems   Review of Systems   Constitutional: Negative. Respiratory: Negative. Cardiovascular: Positive for palpitations and leg swelling. Negative for chest pain. Neurological: Negative. OBJECTIVE:    Vital signs:    BP (!) 124/56   Pulse 80   Ht 5' 9.5\" (1.765 m)   Wt 139 lb (63 kg)   SpO2 95%   BMI 20.23 kg/m²      Physical Exam:  Constitutional:  Comfortable and alert, NAD, appears older than stated age, frail  Eyes: PERRL, sclera nonicteric  Neck:  Supple, no masses, no thyroidmegaly, JVP <8  Skin:  Warm and dry; no rash or lesions; +sternal incision  Heart: Regular, normal apex, S1 and S2 normal, no M/G/R  Lungs:  Normal respiratory effort; clear; no wheezing/rhonchi/rales  Abdomen: soft, non tender, + bowel sounds  Extremities:  LLE wrapped, + edema, trace RLE edema  Neuro: alert and oriented, moves legs and arms equally, normal mood and affect    Data Reviewed:      Echo 8/12/2021:  -Left ventricular cavity size is enlarged. LVESD=5.57 cm   -Overall left ventricular systolic function appears mildly reduced.   -Ejection fraction is visually estimated to be 50%. -There is mild hypokinesis of the basal to mid inferior and inferolateral   walls. -Grade II diastolic dysfunction with elevated LV filling pressures.   -The right ventricle is mildly enlarged.   -Right ventricular systolic function is normal.   -Mitral valve leaflets appear mildly thickened. -Mitral regurgitation that may be severe. -The aortic valve leaflets appear mildly thickened. -Mild aortic regurgitation.   -Moderate tricuspid regurgitation with a PASP of 57 mmHg.   -Trivial pulmonic regurgitation.     Coronary angiogram 8/16/2021:  Findings:  Artery Findings/Result   LM Normal   LAD Mid 100% after large diagonal, distal supplied by LIMA   Cx 99% ostial, distal supplied by R-L collaterals   RI N/A   RCA 70% distal, % prox   S-O occluded   S-PLB patent L-LAD  patent   LVEDP 8   LVG NA due to contrast    Intervention:         None   Post Cath Dx:       Severe native disease as above  Occluded S-OM  Suspect severe MR related to papillary dysfunction related to occluded distal RCA and Cx  Consider MVR when wound issues resolve if MR does not improved with medical therapy  Would followup with Dr. Mary Wilder    CABG 5/3/2021:  Urgent CABG X 3, with pedicled LIMA to LAD, single Greater Saphenous VG to PV br of RCA, separate single Greater SVG to OM2,  LAAppendage obliteration with 40 mm Atricure LA Clip, CPB, EVH Left Greater Saphenous vein, ABHISHEK, Epiaortic ultrasound, Doppler verification of grafts, Bilateral 5 level intercostal nerve block(Exparel), Platelet gel application. Sternal plating. Echo 4/24/2021:  Normal left ventricle systolic function with an estimated ejection fraction   of 55%. No regional wall motion abnormalities are seen. Normal left ventricular diastolic filling pressure. Mild eccentric aortic regurgitation. Mild mitral and tricuspid regurgitation. Systolic pulmonary artery pressure (SPAP) is normal and estimated at 27 mmHg   (right atrial pressure 3 mmHg). Cardiology Labs Reviewed:   CBC: No results for input(s): WBC, HGB, HCT, PLT in the last 72 hours. BMP:No results for input(s): NA, K, CO2, BUN, CREATININE, LABGLOM, GLUCOSE in the last 72 hours. PT/INR: No results for input(s): PROTIME, INR in the last 72 hours. APTT:No results for input(s): APTT in the last 72 hours. FASTING LIPID PANEL:  Lab Results   Component Value Date    HDL 34 08/11/2021    HDL 43 07/22/2010    LDLCALC 54 08/11/2021    TRIG 97 08/11/2021     LIVER PROFILE:No results for input(s): AST, ALT, ALB in the last 72 hours.   BNP:   Lab Results   Component Value Date    PROBNP 5,729 08/17/2021    PROBNP 9,814 08/15/2021    PROBNP 6,845 08/13/2021    PROBNP 20,636 08/10/2021    PROBNP 15,026 07/11/2021     Reviewed all labs and imaging today    Assessment:

## 2021-08-22 ASSESSMENT — ENCOUNTER SYMPTOMS: RESPIRATORY NEGATIVE: 1

## 2021-08-23 ENCOUNTER — HOSPITAL ENCOUNTER (OUTPATIENT)
Age: 71
Setting detail: SPECIMEN
Discharge: HOME | End: 2021-08-23
Payer: MEDICARE

## 2021-08-23 LAB
ANION GAP SERPL CALCULATED.3IONS-SCNC: 12 MMOL/L (ref 3–16)
BUN BLDV-MCNC: 16 MG/DL (ref 7–20)
CALCIUM SERPL-MCNC: 9.7 MG/DL (ref 8.3–10.6)
CHLORIDE BLD-SCNC: 96 MMOL/L (ref 99–110)
CO2: 24 MMOL/L (ref 21–32)
CREAT SERPL-MCNC: 0.9 MG/DL (ref 0.6–1.2)
GFR AFRICAN AMERICAN: >60
GFR NON-AFRICAN AMERICAN: >60
GLUCOSE BLD-MCNC: 189 MG/DL (ref 70–99)
POTASSIUM SERPL-SCNC: 4.5 MMOL/L (ref 3.5–5.1)
SODIUM BLD-SCNC: 132 MMOL/L (ref 136–145)

## 2021-08-23 PROCEDURE — 80048 BASIC METABOLIC PNL TOTAL CA: CPT

## 2021-08-23 PROCEDURE — 36415 COLL VENOUS BLD VENIPUNCTURE: CPT

## 2021-08-25 ENCOUNTER — TELEPHONE (OUTPATIENT)
Dept: CARDIOLOGY | Age: 71
End: 2021-08-25

## 2021-08-25 ENCOUNTER — HOSPITAL ENCOUNTER (OUTPATIENT)
Dept: WOUND CARE | Age: 71
Discharge: HOME | End: 2021-08-25
Payer: MEDICARE

## 2021-08-25 VITALS
WEIGHT: 140.2 LBS | TEMPERATURE: 97.6 F | BODY MASS INDEX: 20.76 KG/M2 | DIASTOLIC BLOOD PRESSURE: 63 MMHG | RESPIRATION RATE: 18 BRPM | HEIGHT: 69 IN | SYSTOLIC BLOOD PRESSURE: 108 MMHG | HEART RATE: 89 BPM

## 2021-08-25 DIAGNOSIS — L92.9 HYPERGRANULATION: ICD-10-CM

## 2021-08-25 DIAGNOSIS — T81.31XA SURGICAL WOUND DEHISCENCE, INITIAL ENCOUNTER: Primary | ICD-10-CM

## 2021-08-25 DIAGNOSIS — L89.154 PRESSURE ULCER OF COCCYGEAL REGION, STAGE 4 (HCC): ICD-10-CM

## 2021-08-25 PROCEDURE — 17250 CHEM CAUT OF GRANLTJ TISSUE: CPT

## 2021-08-25 PROCEDURE — 97597 DBRDMT OPN WND 1ST 20 CM/<: CPT

## 2021-08-25 PROCEDURE — 11042 DBRDMT SUBQ TIS 1ST 20SQCM/<: CPT | Performed by: INTERNAL MEDICINE

## 2021-08-25 PROCEDURE — 29581 APPL MULTLAYER CMPRN SYS LEG: CPT

## 2021-08-25 PROCEDURE — 97605 NEG PRS WND THER DME<=50SQCM: CPT

## 2021-08-25 PROCEDURE — 11042 DBRDMT SUBQ TIS 1ST 20SQCM/<: CPT

## 2021-08-25 RX ORDER — BACITRACIN ZINC AND POLYMYXIN B SULFATE 500; 1000 [USP'U]/G; [USP'U]/G
OINTMENT TOPICAL ONCE
Status: DISCONTINUED | OUTPATIENT
Start: 2021-08-25 | End: 2021-08-26 | Stop reason: HOSPADM

## 2021-08-25 RX ORDER — BACITRACIN ZINC AND POLYMYXIN B SULFATE 500; 1000 [USP'U]/G; [USP'U]/G
OINTMENT TOPICAL ONCE
Status: CANCELLED | OUTPATIENT
Start: 2021-08-25 | End: 2021-08-25

## 2021-08-25 RX ORDER — LIDOCAINE 50 MG/G
OINTMENT TOPICAL ONCE
Status: CANCELLED | OUTPATIENT
Start: 2021-08-25 | End: 2021-08-25

## 2021-08-25 RX ORDER — LIDOCAINE 40 MG/G
CREAM TOPICAL ONCE
Status: CANCELLED | OUTPATIENT
Start: 2021-08-25 | End: 2021-08-25

## 2021-08-25 RX ORDER — MIDODRINE HYDROCHLORIDE 2.5 MG/1
2.5 TABLET ORAL 3 TIMES DAILY
COMMUNITY
End: 2021-09-16 | Stop reason: SDUPTHER

## 2021-08-25 RX ORDER — LIDOCAINE 40 MG/G
CREAM TOPICAL ONCE
Status: DISCONTINUED | OUTPATIENT
Start: 2021-08-25 | End: 2021-08-26 | Stop reason: HOSPADM

## 2021-08-25 RX ORDER — LIDOCAINE HYDROCHLORIDE 40 MG/ML
SOLUTION TOPICAL ONCE
Status: CANCELLED | OUTPATIENT
Start: 2021-08-25 | End: 2021-08-25

## 2021-08-25 ASSESSMENT — PAIN DESCRIPTION - PAIN TYPE: TYPE: CHRONIC PAIN

## 2021-08-25 ASSESSMENT — PAIN DESCRIPTION - FREQUENCY: FREQUENCY: INTERMITTENT

## 2021-08-25 ASSESSMENT — PAIN DESCRIPTION - DESCRIPTORS: DESCRIPTORS: ACHING

## 2021-08-25 ASSESSMENT — PAIN - FUNCTIONAL ASSESSMENT: PAIN_FUNCTIONAL_ASSESSMENT: PREVENTS OR INTERFERES WITH ALL ACTIVE AND SOME PASSIVE ACTIVITIES

## 2021-08-25 ASSESSMENT — PAIN SCALES - GENERAL
PAINLEVEL_OUTOF10: 8
PAINLEVEL_OUTOF10: 5

## 2021-08-25 ASSESSMENT — PAIN DESCRIPTION - PROGRESSION: CLINICAL_PROGRESSION: NOT CHANGED

## 2021-08-25 ASSESSMENT — PAIN DESCRIPTION - LOCATION: LOCATION: COCCYX

## 2021-08-25 NOTE — TELEPHONE ENCOUNTER
Called to speak with patient about Dr. Matson Salts recommendations for repeat echo for MR and for medication titration. If she calls back, please ask what time tomorrow is best to speak. Thanks!

## 2021-08-25 NOTE — PLAN OF CARE
215 [facility] Physician Orders and Discharge [address], 50 [facility]  Telephone: [phone]      Fax: [phone]        Your home care Madiha Escobedo     Your wound-care supplies will be provided by: We are ordering your wound-care supplies from [employer]. Please note, depending on your insurance coverage, you may have out-of-pocket expenses for these supplies. Someone from [employer] should call you to confirm your order and discuss those potential costs before they ship your products -- please anticipate that call. If your out-of-pocket cost could be substantial, [facility] has a financial hardship program for patients who qualify, so please ask about that if you might need a hand. If you have any questions about your supplies or your potential out-of-pocket costs, or if you need to place an order for a refill of supplies (typically monthly), please call [phone].      NAME:  Gris Taveras   DATE of BIRTH:  1950  PRIMARY DIAGNOSIS FOR WOUND CARE CENTER:  Pressure ulcer     Wound cleansing:   Do not scrub or use excessive force. Wash hands with soap and water before and after dressing changes. Prior to applying a clean dressing, cleanse wound with normal saline, wound cleanser, or mild soap and water. Ask your physician or nurse before getting the wound(s) wet in the shower.                Wound care for home:     LEFT MEDIAL LOWER LEG WOUND:  Dr. Tatum Chew used Silver Nitrate on your wound today. The wound will be black or silver in appearance  for the next several days, this is normal.     Vashe to wound  Procellera moistened with saline, then Hydrogel  Cover with 4x4's  Compri 2 lite compression wrap  Single layer light compression spandagrip to help hold in place  Leave in place for the week. Keep clean, dry & intact.       Your physician has ordered Compression for treatment of venous ulcers, venous insufficiency,and/or Lymphedema. * Do not remove until your next scheduled visit in [address] [place] [address],[facility] Floor- do not get wet. * If your wrap falls down, becomes painful or you have decreased sensation, and/or excessive drainage- please call the [address] [place] [address],[facility] Floor for RN visit to get your compression wrap changed   * You need to exercice as tolerated- walking is good   * Elevate your legs preferrably above level of your heart when sitting as much as possible   * The Goal of this therapy is to reduce Edema and get into long term compression garments to control venous insufficiency, lymphedema and reduce occurrence of venous ulcers       Sacral Wound: RESTART NPWT 8/25/21  Skin prep & Hydrocolloid to maegan-wound skin irritation to protect from drape   Vashe or Hypochlorous acid soaked gauze applied to wound for 2-3 minutes, no need to rinse  Flagyl crushed & sprinkled on wound bed as needed for malodor  Stoma paste or Stoma barrier ring to distal area of wound to help obtain a better seal & prevent air leak  DRAPE to maegan-wound & [address] (send remainder home with patient today)  Black foam-IT HAS TO BE ON THE ACTUAL WOUND & bridged to left hip (MAKE SURE NOT TO OVER FILL UNDERMINED AREA WITH BLACK FOAM)  Cover with drape  Pressure -125 mmHg continuous  HHC to change dressing on Friday, Monday & Wednesday         Please note, all wounds (unless stated otherwise here) were mechanically debrided at the time of cleansing here in the wound-care center today, so a small amount of pain, drainage or bleeding from that process might be expected, and is normal.      All products for home use, including multiple products for a single wound if applicable, are medically necessary in order to achieve the best chance at timely wound healing.  See provider documentation for details if needed.     Substituted dressings applied in the [address] [place] [address],[facility] Floor today, if applicable:     N/a     New orders for this week (labs, imaging, medications, etc.):     Home Care to order dressing supplies through [address] [place] [address] when needed. May contact the local rep. For any questions or to order supplies  Elli Ewing # [phone]      Additional instructions for specific diagnoses:     +ROHO cushion- use at [address] when sitting!!  +True Balance Air group II mattress      General comments for pressure ulcers:  *  Make sure you stay well-hydrated, and maintain good protein intake. *  Reposition at least every two hours, to keep from having pressure in one spot for too long. *  If you are not sure whether you have the best offloading surfaces (mattress, mattress overlay, chair cushion, heel-protector boots, etc), please ask. *  Moisturize your skin regularly with Vaseline, Aquaphor, Aveeno, CeraVe, Cetaphil, Eucerin, Lubriderm, etc; but keep the skin between your toes dry. *  If you smoke, your wound can not heal properly -- please talk with us when you're ready to quit.         F/U Appointment is with Dr. Kelle Rodríguez in 1 week on                                                at                       .     Your nurse  is ABRAHAM Zambrano      If we applied slip-resistant hospital socks today, be sure to remove them at least once a day to inspect your toes or feet, even if you're not changing the wraps or dressings underneath. If you see anything concerning (redness, excess moisture, etc), please call and let us know right away.     Should you experience any significant changes in your wound(s) (including redness, increased warmth, increased pain, increased drainage, odor, or fever) or have questions about your wound care, please contact the [facility] at [phone] Monday-Thursday from 8:00 am - 4:30 pm, or Friday from 8:00 am - 2:30 pm.  If you need help with your wound outside these hours and cannot wait until we are again available, contact your home-care company (if applicable), your PCP, or go to the nearest emergency room.

## 2021-08-25 NOTE — TELEPHONE ENCOUNTER
Patient calling back. She said tomorrow any time would be okay. Call mobile [phone]. She apologizes for missing your call, she was at another appointment.

## 2021-08-25 NOTE — PLAN OF CARE
Leg wound with much improvement since last visit, no debridement today, Ag Nitrate per MD & pt. Tolerated well. Will stop NPWT to leg wound. Start using Procellera to leg wound & apply compri 2 lite compression wrap for edema control. Sacral wound stable, slight improvement, debridement per MD & pt. Tolerated well. Patient had held NPWT this past week, will restart NPWT with collagen to sacrum as previously ordered. Pt. States she is using her ROHO cushion when sitting & is using the TBA mattress when in bed. Dr Kenneth Ayala encouraged to decrease the [facility] to help decrease sliding in bed & spend more time on her side, pt. Verbalizes understanding. Pt. Also states she is continuing to supplement with protein drink daily. F/u in [address] [place] [address],[address] in 1 week as ordered, pt. Aware to call sooner with any changes or questions/concerns. Discharge instructions reviewed with patient, all questions answered, copy given to patient. Dressings were applied to all wounds per M.D. Instructions at this visit.

## 2021-08-26 NOTE — TELEPHONE ENCOUNTER
Called and spoke with Anton Lea. She has increased edema and weight gain. She will increase lasix to 80 mg daily until weight back to 137 lbs (144 lbs today). We will repeat limited echo for MR once back to goal weight. Has had some hypotension and she will continue midodrine for now, plan to stop once BP stable. Would like to add ARB in follow up as well pending BP. Staff- Please call and have her home care nurse Camilo Mauricio with [facility] draw BMP and BNP on 8/30/2021. Thanks!

## 2021-08-26 NOTE — TELEPHONE ENCOUNTER
Spoke with Nurse Derick Sharp at [facility], she took a verbal order for the BNP and BMP. States that Nurse Kaitlyn Brown is out in the field but that she would let her know.

## 2021-08-28 PROBLEM — L92.9 HYPERGRANULATION: Status: ACTIVE | Noted: 2021-08-28

## 2021-08-28 PROBLEM — E87.1 HYPONATREMIA: Status: RESOLVED | Noted: 2017-10-19 | Resolved: 2021-08-28

## 2021-08-28 NOTE — PROGRESS NOTES
[facility] Progress Note    Carlito Le     : 1950    DATE OF VISIT:  2021    Subjective:     Carlito Le is a 72 y.o. female who has a pressure ulcer located on the sacrum. Significant symptoms or pertinent wound history since last visit: feeling ok overall, all things considered -- was hospitalized recently for CHF, returned home last week. Does have her hospital bed and offloading mattress now, in addition to the [facility], but wanted to take a break from NPWT this past week, just because of some skin irritation and difficulty sleeping through alarms. There was some apparent confusion about her [address], but in the end, she still has [address]. Still off her Enbrel, and she is tolerating that ok, as long as the weather is relatively warm and dry. Additional ulcer(s) noted? Yes - the left lower leg surgical dehiscence -- doing quite well. Her current medication list consists of Acapella, DULoxetine, Insulin Syringe-Needle U-100, Roflumilast, Teriparatide (Recombinant), albuterol sulfate HFA, aspirin EC, atorvastatin, blood glucose test strips, calcium carbonate, cetirizine, clopidogrel, cyclobenzaprine, docusate sodium, fluticasone, furosemide, gabapentin, insulin glargine, insulin lispro, ipratropium, latanoprost, metoprolol tartrate, midodrine, morphine, naloxone, omeprazole, oxyCODONE-acetaminophen, predniSONE, spironolactone, and vitamin D. Allergies: Atenolol    Objective:     Vitals:    21 0913   BP: 108/63   Pulse: 89   Resp: 18   Temp: 97.6 °F (36.4 °C)   TempSrc: Oral   Weight: 140 lb 3.2 oz (63.6 kg)   Height: 5' 9\" (1.753 m)     AAOx3, chronically ill appearing, weak, fatigued, NAD  No icterus, thrush, drug rash, abd tenderness  Recent left arm skin tear healed  Moderate LE edema  Intact distal pulses, feet warm, good cap refill; no cellulitis, angitis, fluctuance  Blanca-ulcer skin: indurated, pink, warm and dry.   Ulcer(s): biggest issue at the sacrum is a good deal of superior tunneling / undermining, I think because of her tendency to position in bed with the HOB up, on her back; tissue there is more fibrotic, rest of the wound is reasonably pink-red and granular, some fibrin and biofilm, no bone exposure, no pus, less lateral undermining, one skin edge closer to being well attached; left leg ulcer doing very well, red, granular to hypergranular, minimal epibole and undermining, minimal fibrin, some serous exudate, no concerning depth. Photos also saved in electronic chart.     Today's wound measurements, per RN documentation:  Wound 07/07/21 #3, left medial leg, dehisced surgical, full thickness, onset 5/2021-Wound Length (cm): 5 cm  Wound 12/18/20 #2, Sacrum, Pressure Injury, Stage 4, Onset 5/2020-Wound Length (cm): 3 cm    Wound 07/07/21 #3, left medial leg, dehisced surgical, full thickness, onset 5/2021-Wound Width (cm): 1.5 cm  Wound 12/18/20 #2, Sacrum, Pressure Injury, Stage 4, Onset 5/2020-Wound Width (cm): 4 cm    Wound 07/07/21 #3, left medial leg, dehisced surgical, full thickness, onset 5/2021-Wound Depth (cm): 0.1 cm  Wound 12/18/20 #2, Sacrum, Pressure Injury, Stage 4, Onset 5/2020-Wound Depth (cm): 1.3 cm    Assessment:     Patient Active Problem List   Diagnosis Code    Rheumatoid arthritis ([facility] [address] M06.9    Psoriasis L40.9    GERD (gastroesophageal reflux disease) K21.9    History of tobacco use Z87.891    Uncontrolled diabetes mellitus (HCC) E11.65    Anemia D64.9    Cylindrical bronchiectasis ([facility]) J47.9    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    Immunocompromised state ([facility] [address] D84.9    Hypokalemia E87.6    Coronary artery disease I25.10    Bilateral lower extremity edema R60.0    Essential hypertension I10    Lumbar spondylosis M47.816    Mitral valve insufficiency and aortic valve insufficiency I08.0    Mixed hyperlipidemia E78.2    Myopia of both eyes H52.13    Osteoporosis M81.0    Other chronic sinusitis J32.8    Primary open angle glaucoma (POAG) of both eyes, mild stage H40.1131    Primary osteoarthritis of right hip M16.11    Type 2 diabetes mellitus with unspecified diabetic retinopathy without macular edema ([facility]) E11.319    Uncontrolled type 2 diabetes mellitus with diabetic peripheral angiopathy without gangrene, with long-term current use of insulin ([facility]) E11.51, E11.65, Z79.4    Pressure ulcer of coccygeal region, stage 4 ([facility]) L89.154    NSTEMI (non-ST elevated myocardial infarction) ([facility]) I21.4    Hx of CABG Z95.1    Mild malnutrition ([facility]) E44.1    Acute on chronic diastolic heart failure due to coronary artery disease ([facility]) I50.33, I25.10    Surgical wound dehiscence, initial encounter (at [facility] SVG harvest site) T81.31XA    Chronic obstructive pulmonary disease ([facility]) J44.9    Hypergranulation L92.9       Assessment of today's active condition(s): RA, decreased mobility, Hx back surgery, small sacral DTI that was excised and debrided in the OR to a better-saucerized stage 4 pressure ulcer; very slow progress toward healing, but the quality of the tissue is better than it had been, and I think that superior undermining can improve with better positioning in bed, and the NPWT. Unrelated recent acute coronary syndrome, CABG, saphenous vein harvest, surgical wound dehiscence and infection there, also debrided in the OR, doing very well (I think because it was a much more acute wound, with no offloading issues) -- would benefit from more compression though, I think. Factors contributing to occurrence and/or persistence of the chronic ulcer include edema, venous stasis, diabetes, poor glucose control, chronic pressure, decreased mobility, shear force and immunosuppression. Medical necessity of today's visit is shown by the above documentation. Sharp debridement is indicated today, based upon the exam findings in the wound(s) above. Procedure note:     Consent obtained.  [facility] Anesthetic  Anesthetic: 4% Lidocaine Cream     Using a curette, I sharply debrided the sacrum ulcer(s) down through and including the removal of subcutaneous tissue. The type(s) of tissue debrided included fibrin, biofilm and necrotic/eschar. Total Surface Area Debrided: 12 sq cm. There was a bit of fat necrosis in that undermined pocket, that wasn't able to be visualized at first.    The ulcers were then irrigated with normal saline solution. The procedure was completed with a small amount of bleeding, and hemostasis was with pressure. The patient tolerated the procedure well, with no significant complications. The patient's level of pain during and after the procedure was monitored. Post-debridement measurements, if different from pre-debridement, are in the flowsheet as well.  ____________    To encourage better epithelial cell coverage, I did use AgNO3 to chemically cauterize hypergranulation tissue on the left  lower leg ulcer(s), after application of 4% lidocaine topical solution. This was tolerated well, with no pain or skin injury. Discharge plan:     Treatment in the wound care center today, per RN documentation: Wound 07/07/21 #3, left medial leg, dehisced surgical, full thickness, onset 5/2021-Dressing/Treatment: Other (comment) (Procellera, Hydrogel, 4x4's, Compri 2 Lite)  Wound 12/18/20 #2, Sacrum, Pressure Injury, Stage 4, Onset 5/2020-Dressing/Treatment: Other (comment) (Flagyl,Purachol, black foam,ostomy paste to distal-maegan NPWT). Per physician order, left application of multilayer compression wrap was performed in the wound care center today, to help manage edema, stasis dermatitis, and/or venous ulcers. Leave primary dressing and multi-layer wrap(s) in place until the next appointment. Also discussed ways to keep the wrap dry for a shower, including a plastic cast-guard, available in retail pharmacies.  She should call before her next visit if there is any significant pain, significant strike-through of drainage to the surface of the wrap, or any significant sense of the wrap sliding down more than an inch or so, bunching-up and abrading her skin. I reminded the patient of the importance of weight management and smoking cessation, if applicable; also encouraged ambulation as tolerated, additional lower extremity exercises as instructed in our education sheet, leg elevation when at rest, and compliance with any recommended dietary, diuretic and compression therapies. For offloading, try to be in bed as much as possible, with the bed flat, and lying far over on one or the other hip. Some time completely supine is ok. Some time sitting very upright in a chair, on her ROHO, is ok; try to spend as little time as possible in bed with the [facility] up significantly. Home treatment: good handwashing before and after any dressing changes. Cleanse wound with saline or soap & water before dressing change. May use Vaseline (petrolatum), Aquaphor, Aveeno, CeraVe, Cetaphil, Eucerin, Lubriderm, etc for dry skin. Dressing type for home: Hypochlorous acid spray, Kathia and NPWT (for the sacrum, standard foam and drape, bridge toward a hip, 125 mmHg continuous negative pressure), three times weekly. Written discharge instructions given to patient. Follow up in 1 week.     Electronically signed by Loi Torres MD on 8/28/2021 at 4:59 PM.

## 2021-08-30 ENCOUNTER — HOSPITAL ENCOUNTER (OUTPATIENT)
Age: 71
Discharge: HOME | End: 2021-08-30
Payer: MEDICARE

## 2021-08-30 LAB
ANION GAP SERPL CALCULATED.3IONS-SCNC: 13 MMOL/L (ref 3–16)
BUN BLDV-MCNC: 16 MG/DL (ref 7–20)
CALCIUM SERPL-MCNC: 9.6 MG/DL (ref 8.3–10.6)
CHLORIDE BLD-SCNC: 95 MMOL/L (ref 99–110)
CO2: 28 MMOL/L (ref 21–32)
CREAT SERPL-MCNC: 0.6 MG/DL (ref 0.6–1.2)
GFR AFRICAN AMERICAN: >60
GFR NON-AFRICAN AMERICAN: >60
GLUCOSE BLD-MCNC: 108 MG/DL (ref 70–99)
POTASSIUM SERPL-SCNC: 4.4 MMOL/L (ref 3.5–5.1)
PRO-BNP: 5585 PG/ML (ref 0–124)
SODIUM BLD-SCNC: 136 MMOL/L (ref 136–145)

## 2021-08-30 PROCEDURE — 36415 COLL VENOUS BLD VENIPUNCTURE: CPT

## 2021-08-30 PROCEDURE — 80048 BASIC METABOLIC PNL TOTAL CA: CPT

## 2021-08-30 PROCEDURE — 83880 ASSAY OF NATRIURETIC PEPTIDE: CPT

## 2021-08-31 LAB
AFB CULTURE (MYCOBACTERIA): NORMAL
AFB SMEAR: NORMAL

## 2021-09-01 ENCOUNTER — HOSPITAL ENCOUNTER (OUTPATIENT)
Dept: WOUND CARE | Age: 71
Discharge: HOME | End: 2021-09-01
Payer: MEDICARE

## 2021-09-01 VITALS
HEART RATE: 85 BPM | HEIGHT: 69 IN | WEIGHT: 138 LBS | RESPIRATION RATE: 18 BRPM | TEMPERATURE: 98 F | DIASTOLIC BLOOD PRESSURE: 58 MMHG | BODY MASS INDEX: 20.44 KG/M2 | SYSTOLIC BLOOD PRESSURE: 103 MMHG

## 2021-09-01 DIAGNOSIS — T81.31XA SURGICAL WOUND DEHISCENCE, INITIAL ENCOUNTER: Primary | ICD-10-CM

## 2021-09-01 DIAGNOSIS — L89.154 PRESSURE ULCER OF COCCYGEAL REGION, STAGE 4 (HCC): ICD-10-CM

## 2021-09-01 DIAGNOSIS — T81.31XA SURGICAL WOUND DEHISCENCE, INITIAL ENCOUNTER: ICD-10-CM

## 2021-09-01 PROCEDURE — 11042 DBRDMT SUBQ TIS 1ST 20SQCM/<: CPT | Performed by: INTERNAL MEDICINE

## 2021-09-01 PROCEDURE — 11042 DBRDMT SUBQ TIS 1ST 20SQCM/<: CPT

## 2021-09-01 PROCEDURE — 29581 APPL MULTLAYER CMPRN SYS LEG: CPT

## 2021-09-01 PROCEDURE — 29581 APPL MULTLAYER CMPRN SYS LEG: CPT | Performed by: INTERNAL MEDICINE

## 2021-09-01 PROCEDURE — 97605 NEG PRS WND THER DME<=50SQCM: CPT

## 2021-09-01 PROCEDURE — 97605 NEG PRS WND THER DME<=50SQCM: CPT | Performed by: INTERNAL MEDICINE

## 2021-09-01 RX ORDER — LIDOCAINE 40 MG/G
CREAM TOPICAL ONCE
Status: CANCELLED | OUTPATIENT
Start: 2021-09-01 | End: 2021-09-01

## 2021-09-01 RX ORDER — LIDOCAINE 50 MG/G
OINTMENT TOPICAL ONCE
Status: CANCELLED | OUTPATIENT
Start: 2021-09-01 | End: 2021-09-01

## 2021-09-01 RX ORDER — LIDOCAINE HYDROCHLORIDE 40 MG/ML
SOLUTION TOPICAL ONCE
Status: CANCELLED | OUTPATIENT
Start: 2021-09-01 | End: 2021-09-01

## 2021-09-01 RX ORDER — LIDOCAINE 40 MG/G
CREAM TOPICAL ONCE
Status: DISCONTINUED | OUTPATIENT
Start: 2021-09-01 | End: 2021-09-02 | Stop reason: HOSPADM

## 2021-09-01 RX ORDER — BACITRACIN ZINC AND POLYMYXIN B SULFATE 500; 1000 [USP'U]/G; [USP'U]/G
OINTMENT TOPICAL ONCE
Status: CANCELLED | OUTPATIENT
Start: 2021-09-01 | End: 2021-09-01

## 2021-09-01 RX ORDER — BACITRACIN ZINC AND POLYMYXIN B SULFATE 500; 1000 [USP'U]/G; [USP'U]/G
OINTMENT TOPICAL ONCE
Status: DISCONTINUED | OUTPATIENT
Start: 2021-09-01 | End: 2021-09-02 | Stop reason: HOSPADM

## 2021-09-01 ASSESSMENT — PAIN SCALES - GENERAL
PAINLEVEL_OUTOF10: 0
PAINLEVEL_OUTOF10: 0

## 2021-09-01 NOTE — PLAN OF CARE
[facility] Physician Orders and Discharge [address]  [address], [address]  [place], [facility]  Telephone: [phone]      Fax: [phone] home care company:   [facility]      Your wound-care supplies will be provided by: We are ordering your wound-care supplies from [employer]. Please note, depending on your insurance coverage, you may have out-of-pocket expenses for these supplies. Someone from [employer] should call you to confirm your order and discuss those potential costs before they ship your products -- please anticipate that call. If your out-of-pocket cost could be substantial, [facility] has a financial hardship program for patients who qualify, so please ask about that if you might need a hand. If you have any questions about your supplies or your potential out-of-pocket costs, or if you need to place an order for a refill of supplies (typically monthly), please call [phone]. NAME:  Ludin Taveras   YOB: 1950  PRIMARY DIAGNOSIS FOR WOUND CARE CENTER:  Pressure ulcer     Wound cleansing:   Do not scrub or use excessive force. Wash hands with soap and water before and after dressing changes. Prior to applying a clean dressing, cleanse wound with normal saline, wound cleanser, or mild soap and water. Ask your physician or nurse before getting the wound(s) wet in the shower. Wound care for home:     LEFT MEDIAL LOWER LEG WOUND:  Vashe to wound  Procellera moistened with saline, then Hydrogel  Cover with 4x4's  Compri 2 lite compression wrap  Single layer light compression spandagrip to help hold in place  Leave in place for the week. Keep clean, dry & intact. Your physician has ordered Compression for treatment of venous ulcers, venous insufficiency,and/or Lymphedema. * Do not remove until your next scheduled visit in [facility]- do not get wet.     * If your wrap falls down, becomes painful or you have decreased sensation, and/or excessive drainage- please call the [facility] for RN visit to get your compression wrap changed   * You need to exercice as tolerated- walking is good   * Elevate your legs preferrably above level of your heart when sitting as much as possible   * The Goal of this therapy is to reduce Edema and get into long term compression garments to control venous insufficiency, lymphedema and reduce occurrence of venous ulcers        Sacral Wound:   Skin prep & Hydrocolloid to maegan-wound skin irritation to protect from drape   Vashe or Hypochlorous acid soaked gauze applied to wound for 2-3 minutes, no need to rinse  Flagyl crushed & sprinkled on wound bed as needed for malodor  Stoma paste or Stoma barrier ring to distal area of wound to help obtain a better seal & prevent air leak  DRAPE to maegan-wound & UNDER ANY FOAM FOR BRIDGE  Gently tuck AMD gauze into undermining at 9-12 o'clock   Collagen to remaining wound base (send remainder home with patient today)  Black foam-IT HAS TO BE ON THE ACTUAL WOUND & bridged to left hip (MAKE SURE NOT TO OVER FILL UNDERMINED AREA WITH BLACK FOAM)  Cover with drape  Pressure -125 mmHg continuous  HHC to change dressing on Friday, Monday & Wednesday         Please note, all wounds (unless stated otherwise here) were mechanically debrided at the time of cleansing here in the wound-care center today, so a small amount of pain, drainage or bleeding from that process might be expected, and is normal.      All products for home use, including multiple products for a single wound if applicable, are medically necessary in order to achieve the best chance at timely wound healing. See provider documentation for details if needed.      Substituted dressings applied in the [facility] today, if applicable:     N/a     New orders for this week (labs, imaging, medications, etc.):    BE SURE TO START GENTLY TUCKING AMD GAUZE INTO UNDERMINED AREAS AS DIRECTED

## 2021-09-01 NOTE — PLAN OF CARE
Leg wound showing improvement, no debridement, cont. With current wound care regime with dressings & compression wrap for edema control as ordered. Sacral wound stable with undermining noted, debridement per MD & pt. Tolerated well. Will start using AMD gauze into undermining, otherwise cont. With collagen & NPWT as ordered. Pt. States she is cont. With supplementing protein & spending more time on her side to assist with wound healing. F/u in [facility] in 1 week as ordered, pt. Aware to call sooner with any changes or questions/concerns. Discharge instructions reviewed with patient & sister, all questions answered, copy given to patient. Dressings were applied to all wounds per M.D. Instructions at this visit.

## 2021-09-05 PROBLEM — Z87.891 HISTORY OF TOBACCO USE: Status: RESOLVED | Noted: 2017-10-19 | Resolved: 2021-09-05

## 2021-09-05 NOTE — PROGRESS NOTES
[facility] Progress Note    Carlito Le     : 1950    DATE OF VISIT:  2021    Subjective:     Carlito Le is a 72 y.o. female who has a pressure ulcer located on the sacrum. Significant symptoms or pertinent wound history since last visit: feeling ok overall, not much wound pain, no fever, no resp distress. Says her appetite is good, and I think she's doing better with offloading / positioning at home. Additional ulcer(s) noted? yes. Left leg surgical dehiscence, doing very well. Her current medication list consists of Acapella, DULoxetine, Insulin Syringe-Needle U-100, Roflumilast, Teriparatide (Recombinant), albuterol sulfate HFA, aspirin EC, atorvastatin, blood glucose test strips, calcium carbonate, cetirizine, clopidogrel, cyclobenzaprine, docusate sodium, fluticasone, furosemide, gabapentin, insulin glargine, insulin lispro, ipratropium, latanoprost, metoprolol tartrate, midodrine, morphine, naloxone, omeprazole, oxyCODONE-acetaminophen, predniSONE, spironolactone, and vitamin D. Still off Enbrel for now, some joint discomfort at times, not too bad with the weather being so warm. Allergies: Atenolol    Objective:     Vitals:    21 0927   BP: (!) 103/58   Pulse: 85   Resp: 18   Temp: 98 °F (36.7 °C)   TempSrc: Oral   Weight: 138 lb (62.6 kg)   Height: 5' 9\" (1.753 m)     AAOx3, fatigued, NAD  Intact distal pulses, feet warm, good cap refill  Milder LE edema  No cellulitis, angitis, fluctuance  No acute arthritis or bursitis  No contact dermatitis or cutaneous Candidiasis  Blanca-ulcer skin: indurated, pink, warm and dry. Ulcer(s): left leg smaller, red, granular, flatter, not inflamed, clean; sacral ulcer with some good granulation over most of the visible surfaces, but still that pocket of superior undermining where the tissue is more fibrotic, a bit of SQ necrosis, usual layer of fibrin and biofilm, no signs of active infection, no bone exposed. Photos also saved in electronic chart.     Today's wound measurements, per RN documentation:  Wound 07/07/21 #3, left medial leg, dehisced surgical, full thickness, onset 5/2021-Wound Length (cm): 4.2 cm  Wound 12/18/20 #2, Sacrum, Pressure Injury, Stage 4, Onset 5/2020-Wound Length (cm): 3 cm    Wound 07/07/21 #3, left medial leg, dehisced surgical, full thickness, onset 5/2021-Wound Width (cm): 1.7 cm  Wound 12/18/20 #2, Sacrum, Pressure Injury, Stage 4, Onset 5/2020-Wound Width (cm): 2 cm    Wound 07/07/21 #3, left medial leg, dehisced surgical, full thickness, onset 5/2021-Wound Depth (cm): 0.1 cm  Wound 12/18/20 #2, Sacrum, Pressure Injury, Stage 4, Onset 5/2020-Wound Depth (cm): 1.2 cm    Assessment:     Patient Active Problem List   Diagnosis Code    [facility] M06.9    Psoriasis L40.9    GERD (gastroesophageal reflux disease) K21.9    Anemia D64.9    Cylindrical bronchiectasis ([facility]) J47.9    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    Immunocompromised state ([facility] [address] D84.9    Coronary artery disease I25.10    Bilateral lower extremity edema R60.0    Essential hypertension I10    Lumbar spondylosis M47.816    Mitral valve insufficiency and aortic valve insufficiency I08.0    Mixed hyperlipidemia E78.2    Myopia of both eyes H52.13    Osteoporosis M81.0    Other chronic sinusitis J32.8    Primary open angle glaucoma (POAG) of both eyes, mild stage H40.1131    Primary osteoarthritis of right hip M16.11    Type 2 diabetes mellitus with unspecified diabetic retinopathy without macular edema ([facility]) E11.319    Type 2 diabetes mellitus with diabetic peripheral angiopathy without gangrene, with long-term current use of insulin ([facility]) E11.51, Z79.4    Pressure ulcer of coccygeal region, stage 4 ([facility]) L89.154    NSTEMI (non-ST elevated myocardial infarction) ([facility] [address] I21.4    Hx of CABG Z95.1    Mild malnutrition ([facility]) E44.1    Surgical wound dehiscence, initial encounter (at [facility] SVG harvest site) T81. 31XA    Chronic obstructive pulmonary disease (HCC) J44.9    Hypergranulation L92.9       Assessment of today's active condition(s): RA, Hx back surgery, initial sacral DTI progressed to a stage 4 sacral pressure ulcer, no Hx of deep infection; healing has been very slow because of difficulty in offloading, plus her immune compromise, a couple of soft tissue infections, and intercurrent illnesses / hospital admissions - if not for the superior undermining, this is about as healthy as it's looked. Also a healing left leg ulcer that started from a SVG harvest, complicated by surgical dehiscence, necrosis, soft tissue infection, widely excised in the OR. Factors contributing to occurrence and/or persistence of the chronic ulcer include venous stasis, lymphedema, diabetes, chronic pressure, decreased mobility, shear force and immunosuppression. Medical necessity of today's visit is shown by the above documentation. Sharp debridement is indicated today, based upon the exam findings in the wound(s) above. Procedure note:     Consent obtained. Time out performed per [facility] policy. Anesthetic  Anesthetic: 4% Lidocaine Cream     Using a curette, I sharply debrided the sacrum ulcer(s) down through and including the removal of subcutaneous tissue. The type(s) of tissue debrided included fibrin, biofilm and necrotic/eschar. Total Surface Area Debrided: 6 sq cm. The ulcers were then irrigated with normal saline solution. The procedure was completed with a small amount of bleeding, and hemostasis was with pressure. The patient tolerated the procedure well, with no significant complications. The patient's level of pain during and after the procedure was monitored. Post-debridement measurements, if different from pre-debridement, are in the flowsheet as well.     Discharge plan:     Treatment in the wound care center today, per RN documentation: Wound 07/07/21 #3, left medial leg, dehisced surgical, full thickness,

## 2021-09-08 ENCOUNTER — HOSPITAL ENCOUNTER (OUTPATIENT)
Dept: WOUND CARE | Age: 71
Discharge: HOME | End: 2021-09-08
Payer: MEDICARE

## 2021-09-08 VITALS
DIASTOLIC BLOOD PRESSURE: 68 MMHG | RESPIRATION RATE: 18 BRPM | TEMPERATURE: 97 F | SYSTOLIC BLOOD PRESSURE: 113 MMHG | HEART RATE: 84 BPM

## 2021-09-08 DIAGNOSIS — T81.31XA SURGICAL WOUND DEHISCENCE, INITIAL ENCOUNTER: Primary | ICD-10-CM

## 2021-09-08 DIAGNOSIS — L89.154 PRESSURE ULCER OF COCCYGEAL REGION, STAGE 4 (HCC): ICD-10-CM

## 2021-09-08 PROCEDURE — 11042 DBRDMT SUBQ TIS 1ST 20SQCM/<: CPT | Performed by: INTERNAL MEDICINE

## 2021-09-08 PROCEDURE — 29581 APPL MULTLAYER CMPRN SYS LEG: CPT

## 2021-09-08 PROCEDURE — 97605 NEG PRS WND THER DME<=50SQCM: CPT | Performed by: INTERNAL MEDICINE

## 2021-09-08 PROCEDURE — 11042 DBRDMT SUBQ TIS 1ST 20SQCM/<: CPT

## 2021-09-08 PROCEDURE — 29581 APPL MULTLAYER CMPRN SYS LEG: CPT | Performed by: INTERNAL MEDICINE

## 2021-09-08 PROCEDURE — 97605 NEG PRS WND THER DME<=50SQCM: CPT

## 2021-09-08 RX ORDER — BACITRACIN ZINC AND POLYMYXIN B SULFATE 500; 1000 [USP'U]/G; [USP'U]/G
OINTMENT TOPICAL ONCE
Status: CANCELLED | OUTPATIENT
Start: 2021-09-08 | End: 2021-09-08

## 2021-09-08 RX ORDER — LIDOCAINE 40 MG/G
CREAM TOPICAL ONCE
Status: CANCELLED | OUTPATIENT
Start: 2021-09-08 | End: 2021-09-08

## 2021-09-08 RX ORDER — BACITRACIN ZINC AND POLYMYXIN B SULFATE 500; 1000 [USP'U]/G; [USP'U]/G
OINTMENT TOPICAL ONCE
Status: DISCONTINUED | OUTPATIENT
Start: 2021-09-08 | End: 2021-09-09 | Stop reason: HOSPADM

## 2021-09-08 RX ORDER — LIDOCAINE 40 MG/G
CREAM TOPICAL ONCE
Status: DISCONTINUED | OUTPATIENT
Start: 2021-09-08 | End: 2021-09-09 | Stop reason: HOSPADM

## 2021-09-08 RX ORDER — LIDOCAINE HYDROCHLORIDE 40 MG/ML
SOLUTION TOPICAL ONCE
Status: CANCELLED | OUTPATIENT
Start: 2021-09-08 | End: 2021-09-08

## 2021-09-08 RX ORDER — LIDOCAINE 50 MG/G
OINTMENT TOPICAL ONCE
Status: CANCELLED | OUTPATIENT
Start: 2021-09-08 | End: 2021-09-08

## 2021-09-08 ASSESSMENT — PAIN DESCRIPTION - ONSET: ONSET: ON-GOING

## 2021-09-08 ASSESSMENT — PAIN DESCRIPTION - FREQUENCY: FREQUENCY: INTERMITTENT

## 2021-09-08 ASSESSMENT — PAIN DESCRIPTION - PAIN TYPE: TYPE: CHRONIC PAIN

## 2021-09-08 ASSESSMENT — PAIN DESCRIPTION - DESCRIPTORS: DESCRIPTORS: BURNING

## 2021-09-08 ASSESSMENT — PAIN DESCRIPTION - ORIENTATION: ORIENTATION: LEFT;MID

## 2021-09-08 ASSESSMENT — PAIN DESCRIPTION - LOCATION: LOCATION: LEG

## 2021-09-08 ASSESSMENT — PAIN - FUNCTIONAL ASSESSMENT: PAIN_FUNCTIONAL_ASSESSMENT: PREVENTS OR INTERFERES SOME ACTIVE ACTIVITIES AND ADLS

## 2021-09-08 ASSESSMENT — PAIN DESCRIPTION - PROGRESSION: CLINICAL_PROGRESSION: NOT CHANGED

## 2021-09-08 ASSESSMENT — PAIN SCALES - GENERAL
PAINLEVEL_OUTOF10: 4
PAINLEVEL_OUTOF10: 0

## 2021-09-08 NOTE — PLAN OF CARE
1909 [facility] Physician Orders and Discharge 800 [facility]  [address] [facility], [address] [place] [address]  Telephone: [phone]      Fax: [phone]        Your home care Madiha Escobedo     Your wound-care supplies will be provided by: We are ordering your wound-care supplies from [employer]. Please note, depending on your insurance coverage, you may have out-of-pocket expenses for these supplies. Someone from [facility] should call you to confirm your order and discuss those potential costs before they ship your products -- please anticipate that call. If your out-of-pocket cost could be substantial, [facility] has a financial hardship program for patients who qualify, so please ask about that if you might need a hand. If you have any questions about your supplies or your potential out-of-pocket costs, or if you need to place an order for a refill of supplies (typically monthly), please call [phone].      NAME:  Gris Taveras   DATE of BIRTH:  1950  PRIMARY DIAGNOSIS FOR WOUND CARE CENTER:  Pressure ulcer     Wound cleansing:   Do not scrub or use excessive force. Wash hands with soap and water before and after dressing changes. Prior to applying a clean dressing, cleanse wound with normal saline, wound cleanser, or mild soap and water. Ask your physician or nurse before getting the wound(s) wet in the shower.                Wound care for home:     LEFT MEDIAL LOWER LEG WOUND:  Vashe to wound  Procellera moistened with saline, then Hydrogel  Cover with 4x4's  Compri 2 lite compression wrap  Single layer light compression spandagrip to help hold in place  Leave in place for the week.  Keep clean, dry & intact.       Your physician has ordered Compression for treatment of venous ulcers, venous insufficiency,and/or Lymphedema.   * Do not remove until your next scheduled visit in [facility]- do not get wet.    * If your wrap falls down, becomes painful or you have decreased sensation, and/or excessive drainage- please call the [address] [place] [address],[address] for RN visit to get your compression wrap changed   * You need to exercice as tolerated- walking is good   * Elevate your legs preferrably above level of your heart when sitting as much as possible   * The Goal of this therapy is to reduce Edema and get into long term compression garments to control venous insufficiency, lymphedema and reduce occurrence of venous ulcers        Sacral Wound:   Skin prep & Hydrocolloid to maegan-wound skin irritation to protect from drape   Vashe or Hypochlorous acid soaked gauze applied to wound for 2-3 minutes, no need to rinse  Flagyl crushed & sprinkled on wound bed as needed for malodor  Stoma paste or Stoma barrier ring to distal area of wound to help obtain a better seal & prevent air leak  DRAPE to maegan-wound & UNDER ANY FOAM FOR BRIDGE  Gently tuck AMD gauze into undermining at 12-2 o'clock=2 cm  Collagen to remaining wound base (send remainder home with patient today)  Black foam-IT HAS TO BE ON THE ACTUAL WOUND & bridged to left hip (MAKE SURE NOT TO OVER FILL UNDERMINED AREA WITH BLACK FOAM)  Cover with drape  Pressure -125 mmHg continuous  HHC to change dressing on Friday, Monday & Wednesday         Please note, all wounds (unless stated otherwise here) were mechanically debrided at the time of cleansing here in the wound-care center today, so a small amount of pain, drainage or bleeding from that process might be expected, and is normal.      All products for home use, including multiple products for a single wound if applicable, are medically necessary in order to achieve the best chance at timely wound healing.  See provider documentation for details if needed.     Substituted dressings applied in the [address] [place] [address],[address] today, if applicable:     N/a     New orders for this week (labs, imaging, medications, etc.):     BE SURE TO START GENTLY TUCKING AMD GAUZE INTO UNDERMINED AREAS AS DIRECTED ABOVE.       Home Care to order dressing supplies through [employer] when needed. May contact the local rep. For any questions or to order supplies  Samantha Montenegro # [phone]      Additional instructions for specific diagnoses:     +ROHO cushion- use at [address] when sitting!!  +True Balance Air group II mattress      General comments for pressure ulcers:  *  Make sure you stay well-hydrated, and maintain good protein intake. *  Reposition at least every two hours, to keep from having pressure in one spot for too long. *  If you are not sure whether you have the best offloading surfaces (mattress, mattress overlay, chair cushion, heel-protector boots, etc), please ask. *  Moisturize your skin regularly with Vaseline, Aquaphor, Aveeno, CeraVe, Cetaphil, Eucerin, Lubriderm, etc; but keep the skin between your toes dry. *  If you smoke, your wound can not heal properly -- please talk with us when you're ready to quit.         F/U Appointment is with Dr. Yuniel Ocampo in 1 week on                                                at                       .     Your nurse  is ABRAHAM Zambrano      If we applied slip-resistant hospital socks today, be sure to remove them at least once a day to inspect your toes or feet, even if you're not changing the wraps or dressings underneath. If you see anything concerning (redness, excess moisture, etc), please call and let us know right away.     Should you experience any significant changes in your wound(s) (including redness, increased warmth, increased pain, increased drainage, odor, or fever) or have questions about your wound care, please contact the [employer] at [phone] Monday-Thursday from 8:00 am - 4:30 pm, or Friday from 8:00 am - 2:30 pm.  If you need help with your wound outside these hours and cannot wait until we are again available, contact your home-care company (if applicable), your PCP, or go to the nearest emergency room.

## 2021-09-12 NOTE — PROGRESS NOTES
[address] [facility] Progress Note    Ion Mckeon     : 1950    DATE OF VISIT:  2021    Subjective:     Ion Mckeon is a 72 y.o. female who has a pressure ulcer located on the sacrum. Significant symptoms or pertinent wound history since last visit: feeling ok, no fever, no falls, not much wound pain, leg swelling doing well, trying to eat more protein, doing better with offloading. Additional ulcer(s) noted? yes. The healing left leg site from the SVG harvest, which later became infected and dehisced. Her current medication list consists of Acapella, DULoxetine, Insulin Syringe-Needle U-100, Roflumilast, Teriparatide (Recombinant), albuterol sulfate HFA, aspirin EC, atorvastatin, blood glucose test strips, calcium carbonate, cetirizine, clopidogrel, cyclobenzaprine, docusate sodium, fluticasone, furosemide, gabapentin, insulin glargine, insulin lispro, ipratropium, latanoprost, metoprolol tartrate, midodrine, morphine, naloxone, omeprazole, oxyCODONE-acetaminophen, predniSONE, spironolactone, and vitamin D. Allergies: Atenolol    Objective:     Vitals:    21 0919   BP: 113/68   Pulse: 84   Resp: 18   Temp: 97 °F (36.1 °C)   TempSrc: Oral     AAOx3, fatigued, NAD  Intact distal pulses, feet warm, good cap refill  Mild LE edema  No cellulitis, angitis, fluctuance  No acute arthritis or bursitis  No contact dermatitis or cutaneous Candidiasis  Blanca-ulcer skin: indurated, pink, warm and dry. Ulcer(s): left leg smaller, red, granular, flat, not inflamed, clean; sacral ulcer with some good granulation over most of the visible surfaces, but still that pocket of superior undermining where the tissue had been more fibrotic - that is looking a bit more pink and red this week though, still a bit of SQ necrosis, usual layer of fibrin and biofilm, no signs of active infection, no bone exposed. Photos also saved in electronic chart.     Today's wound measurements, per RN documentation:  Wound 07/07/21 #3, left medial leg, dehisced surgical, full thickness, onset 5/2021-Wound Length (cm): 6 cm  Wound 12/18/20 #2, Sacrum, Pressure Injury, Stage 4, Onset 5/2020-Wound Length (cm): 3.4 cm    Wound 07/07/21 #3, left medial leg, dehisced surgical, full thickness, onset 5/2021-Wound Width (cm): 1.2 cm  Wound 12/18/20 #2, Sacrum, Pressure Injury, Stage 4, Onset 5/2020-Wound Width (cm): 0.8 cm    Wound 07/07/21 #3, left medial leg, dehisced surgical, full thickness, onset 5/2021-Wound Depth (cm): 0.2 cm  Wound 12/18/20 #2, Sacrum, Pressure Injury, Stage 4, Onset 5/2020-Wound Depth (cm): 1.1 cm    Assessment:     Patient Active Problem List   Diagnosis Code    Rheumatoid arthritis ([facility] M06.9    Psoriasis L40.9    GERD (gastroesophageal reflux disease) K21.9    Anemia D64.9    Cylindrical bronchiectasis ([facility]) J47.9    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    Immunocompromised state ([facility] D84.9    Coronary artery disease I25.10    Bilateral lower extremity edema R60.0    Essential hypertension I10    Lumbar spondylosis M47.816    Mitral valve insufficiency and aortic valve insufficiency I08.0    Mixed hyperlipidemia E78.2    Myopia of both eyes H52.13    Osteoporosis M81.0    Other chronic sinusitis J32.8    Primary open angle glaucoma (POAG) of both eyes, mild stage H40.1131    Primary osteoarthritis of right hip M16.11    Type 2 diabetes mellitus with unspecified diabetic retinopathy without macular edema ([facility]) E11.319    Type 2 diabetes mellitus with diabetic peripheral angiopathy without gangrene, with long-term current use of insulin ([facility]) E11.51, Z79.4    Pressure ulcer of coccygeal region, stage 4 ([facility]) L89.154    NSTEMI (non-ST elevated myocardial infarction) ([facility] I21.4    Hx of CABG Z95.1    Mild malnutrition ([facility]) E44.1    Surgical wound dehiscence, initial encounter (at LLE SVG harvest site) T81.31XA    Chronic obstructive pulmonary disease ([facility] J44.9    Hypergranulation L92.9       Assessment of today's active condition(s): RA, Hx back surgery, poor mobility, stage 4 sacral pressure ulcer, no signs of infection, making slow progress toward healing. Also CABG, SVG harvest, wound dehiscence and infection there, recently treated with OR excision and ABx, making very good progress. Factors contributing to occurrence and/or persistence of the chronic ulcer include edema, venous stasis, diabetes, chronic pressure, decreased mobility, shear force and immunosuppression. Medical necessity of today's visit is shown by the above documentation. Sharp debridement is indicated today, based upon the exam findings in the wound(s) above. Procedure note:     Consent obtained. Time out performed per [address] [place] [address]  policy. Anesthetic  Anesthetic: 4% Lidocaine Cream     Using a curette, I sharply debrided the sacrum ulcer(s) down through and including the removal of subcutaneous tissue. The type(s) of tissue debrided included fibrin, biofilm and necrotic/eschar. Total Surface Area Debrided: 3 sq cm. The ulcers were then irrigated with normal saline solution. The procedure was completed with a small amount of bleeding, and hemostasis was with pressure. The patient tolerated the procedure well, with no significant complications. The patient's level of pain during and after the procedure was monitored. Post-debridement measurements, if different from pre-debridement, are in the flowsheet as well. Discharge plan:     Treatment in the wound care center today, per RN documentation: Wound 07/07/21 #3, left medial leg, dehisced surgical, full thickness, onset 5/2021-Dressing/Treatment: Other (comment) (Procellera, Hydrogel, 4x4's, Compri 2 Lite)  Wound 12/18/20 #2, Sacrum, Pressure Injury, Stage 4, Onset 5/2020-Dressing/Treatment: Other (comment) (Flagyl, AMD gauze, Purachol Ag, NPWT ).     Per physician order, left application of multilayer compression wrap was performed in the wound care center today, to help manage edema, stasis dermatitis, and/or venous ulcers. Leave primary dressing and multi-layer wrap(s) in place until the next appointment. Also discussed ways to keep the wrap dry for a shower, including a plastic cast-guard, available in retail pharmacies. She should call before her next visit if there is any significant pain, significant strike-through of drainage to the surface of the wrap, or any significant sense of the wrap sliding down more than an inch or so, bunching-up and abrading her skin. I reminded the patient of the importance of weight management and smoking cessation, if applicable; also encouraged ambulation as tolerated, additional lower extremity exercises as instructed in our education sheet, leg elevation when at rest, and compliance with any recommended dietary, diuretic and compression therapies. Keep working on protein intake, glucose control and offloading. Home treatment: good handwashing before and after any dressing changes. Cleanse wound with saline or soap & water before dressing change. May use Vaseline (petrolatum), Aquaphor, Aveeno, CeraVe, Cetaphil, Eucerin, Lubriderm, etc for dry skin. Dressing type for home: as above for the sacrum, three times weekly. Written discharge instructions given to patient. Follow up in 1 week.     Electronically signed by Christian Cotton MD on 9/12/2021 at 2:07 PM.

## 2021-09-15 ENCOUNTER — HOSPITAL ENCOUNTER (OUTPATIENT)
Dept: WOUND CARE | Age: 71
Discharge: HOME | End: 2021-09-15
Payer: MEDICARE

## 2021-09-15 VITALS
SYSTOLIC BLOOD PRESSURE: 125 MMHG | HEART RATE: 100 BPM | RESPIRATION RATE: 18 BRPM | BODY MASS INDEX: 19.73 KG/M2 | TEMPERATURE: 97.8 F | WEIGHT: 133.2 LBS | HEIGHT: 69 IN | DIASTOLIC BLOOD PRESSURE: 72 MMHG

## 2021-09-15 DIAGNOSIS — L89.154 PRESSURE ULCER OF COCCYGEAL REGION, STAGE 4 (HCC): ICD-10-CM

## 2021-09-15 DIAGNOSIS — T81.31XA SURGICAL WOUND DEHISCENCE, INITIAL ENCOUNTER: Primary | ICD-10-CM

## 2021-09-15 PROCEDURE — 29581 APPL MULTLAYER CMPRN SYS LEG: CPT | Performed by: INTERNAL MEDICINE

## 2021-09-15 PROCEDURE — 29581 APPL MULTLAYER CMPRN SYS LEG: CPT

## 2021-09-15 PROCEDURE — 97605 NEG PRS WND THER DME<=50SQCM: CPT

## 2021-09-15 PROCEDURE — 17250 CHEM CAUT OF GRANLTJ TISSUE: CPT

## 2021-09-15 PROCEDURE — 17250 CHEM CAUT OF GRANLTJ TISSUE: CPT | Performed by: INTERNAL MEDICINE

## 2021-09-15 PROCEDURE — 97597 DBRDMT OPN WND 1ST 20 CM/<: CPT | Performed by: INTERNAL MEDICINE

## 2021-09-15 PROCEDURE — 97597 DBRDMT OPN WND 1ST 20 CM/<: CPT

## 2021-09-15 RX ORDER — LIDOCAINE 40 MG/G
CREAM TOPICAL ONCE
Status: DISCONTINUED | OUTPATIENT
Start: 2021-09-15 | End: 2021-09-16 | Stop reason: HOSPADM

## 2021-09-15 RX ORDER — BACITRACIN ZINC AND POLYMYXIN B SULFATE 500; 1000 [USP'U]/G; [USP'U]/G
OINTMENT TOPICAL ONCE
Status: DISCONTINUED | OUTPATIENT
Start: 2021-09-15 | End: 2021-09-16 | Stop reason: HOSPADM

## 2021-09-15 RX ORDER — BACITRACIN ZINC AND POLYMYXIN B SULFATE 500; 1000 [USP'U]/G; [USP'U]/G
OINTMENT TOPICAL ONCE
Status: CANCELLED | OUTPATIENT
Start: 2021-09-15 | End: 2021-09-15

## 2021-09-15 RX ORDER — LIDOCAINE 40 MG/G
CREAM TOPICAL ONCE
Status: CANCELLED | OUTPATIENT
Start: 2021-09-15 | End: 2021-09-15

## 2021-09-15 RX ORDER — LIDOCAINE HYDROCHLORIDE 40 MG/ML
SOLUTION TOPICAL ONCE
Status: CANCELLED | OUTPATIENT
Start: 2021-09-15 | End: 2021-09-15

## 2021-09-15 RX ORDER — LIDOCAINE 50 MG/G
OINTMENT TOPICAL ONCE
Status: CANCELLED | OUTPATIENT
Start: 2021-09-15 | End: 2021-09-15

## 2021-09-15 ASSESSMENT — PAIN DESCRIPTION - FREQUENCY: FREQUENCY: INTERMITTENT

## 2021-09-15 ASSESSMENT — PAIN DESCRIPTION - DESCRIPTORS: DESCRIPTORS: BURNING

## 2021-09-15 ASSESSMENT — PAIN SCALES - GENERAL
PAINLEVEL_OUTOF10: 0
PAINLEVEL_OUTOF10: 5

## 2021-09-15 ASSESSMENT — PAIN DESCRIPTION - DIRECTION: RADIATING_TOWARDS: DENIES

## 2021-09-15 ASSESSMENT — PAIN DESCRIPTION - LOCATION: LOCATION: COCCYX

## 2021-09-15 ASSESSMENT — PAIN - FUNCTIONAL ASSESSMENT: PAIN_FUNCTIONAL_ASSESSMENT: PREVENTS OR INTERFERES SOME ACTIVE ACTIVITIES AND ADLS

## 2021-09-15 ASSESSMENT — PAIN DESCRIPTION - ORIENTATION: ORIENTATION: MID

## 2021-09-15 ASSESSMENT — PAIN DESCRIPTION - PAIN TYPE: TYPE: CHRONIC PAIN

## 2021-09-15 ASSESSMENT — ENCOUNTER SYMPTOMS: RESPIRATORY NEGATIVE: 1

## 2021-09-15 ASSESSMENT — PAIN DESCRIPTION - ONSET: ONSET: ON-GOING

## 2021-09-15 ASSESSMENT — PAIN DESCRIPTION - PROGRESSION: CLINICAL_PROGRESSION: NOT CHANGED

## 2021-09-15 NOTE — PLAN OF CARE
Leg wound showing improvement, no debridement, Ag Nitrate per MD. Sacral wound stable, debridement per MD & pt. Tolerated well. Will cont. With current wound care regime with dressing changes. Pt. States she has been doing better with off-loading. Reinforced importance of frequent repositioning from side to side. Pt. Using ROHO cushion when sitting, but limiting time seated. F/u in [address] [place] [address],[address] in 1 week as ordered, pt. Aware to call sooner with any changes or questions/concerns. Discharge instructions reviewed with patient, all questions answered, copy given to patient. Dressings were applied to all wounds per M.D. Instructions at this visit.

## 2021-09-15 NOTE — PLAN OF CARE
[facility] Physician Orders and Discharge [address] [facility]  Telephone: [phone]      Fax: [phone]        Your home care Madiha Escobedo     Your wound-care supplies will be provided by: We are ordering your wound-care supplies from [employer]. Please note, depending on your insurance coverage, you may have out-of-pocket expenses for these supplies. Someone from [facility] should call you to confirm your order and discuss those potential costs before they ship your products -- please anticipate that call. If your out-of-pocket cost could be substantial, [facility] has a financial hardship program for patients who qualify, so please ask about that if you might need a hand. If you have any questions about your supplies or your potential out-of-pocket costs, or if you need to place an order for a refill of supplies (typically monthly), please call [phone].      NAME:  Gris Taveras   DATE of BIRTH:  1950  PRIMARY DIAGNOSIS FOR WOUND CARE CENTER:  Pressure ulcer     Wound cleansing:   Do not scrub or use excessive force. Wash hands with soap and water before and after dressing changes. Prior to applying a clean dressing, cleanse wound with normal saline, wound cleanser, or mild soap and water. Ask your physician or nurse before getting the wound(s) wet in the shower.                Wound care for home:     LEFT MEDIAL LOWER LEG WOUND:  Dr. Maureen Gonzalez used Silver Nitrate on your wound today. The wound will be black or silver in appearance  for the next several days, this is normal.     Vashe to wound  Procellera moistened with saline, then Hydrogel  Cover with 4x4's  Compri 2 lite compression wrap  Single layer light compression spandagrip to help hold in place  Leave in place for the week.  Keep clean, dry & intact.       Your physician has ordered Compression for treatment of venous ulcers, venous insufficiency,and/or Lymphedema.   * Do not remove until your next scheduled visit in [facility]- do not get wet.    * If your wrap falls down, becomes painful or you have decreased sensation, and/or excessive drainage- please call the [facility] for RN visit to get your compression wrap changed   * You need to exercice as tolerated- walking is good   * Elevate your legs preferrably above level of your heart when sitting as much as possible   * The Goal of this therapy is to reduce Edema and get into long term compression garments to control venous insufficiency, lymphedema and reduce occurrence of venous ulcers        Sacral Wound:   Skin prep & Hydrocolloid to maegan-wound skin irritation to protect from drape   Vashe or Hypochlorous acid soaked gauze applied to wound for 2-3 minutes, no need to rinse  Flagyl crushed & sprinkled on wound bed as needed for malodor  Stoma paste or Stoma barrier ring to distal area of wound to help obtain a better seal & prevent air leak  DRAPE to maegan-wound & UNDER ANY FOAM FOR BRIDGE  Gently tuck AMD gauze into undermining at 12-2 o'clock=2 cm  Collagen to remaining wound base (send remainder home with patient today)  Black foam-IT HAS TO BE ON THE ACTUAL WOUND & bridged to left hip (MAKE SURE NOT TO OVER FILL UNDERMINED AREA WITH BLACK FOAM)  Cover with drape  Pressure -125 mmHg continuous  HHC to change dressing on Friday, Monday & Wednesday         Please note, all wounds (unless stated otherwise here) were mechanically debrided at the time of cleansing here in the wound-care center today, so a small amount of pain, drainage or bleeding from that process might be expected, and is normal.      All products for home use, including multiple products for a single wound if applicable, are medically necessary in order to achieve the best chance at timely wound healing.  See provider documentation for details if needed.     Substituted dressings applied in the [facility] today, if applicable:     Sacral wound: Vashe spray, Opticell Ag, Cover with sacral border today in [facility] only    New orders for this week (labs, imaging, medications, etc.):     BE SURE TO START GENTLY TUCKING AMD GAUZE INTO UNDERMINED AREAS AS DIRECTED ABOVE.       Home Care to order dressing supplies through [address] [place] [address] when needed. May contact the local rep. For any questions or to order supplies  Christy Culver # [phone]      Additional instructions for specific diagnoses:     +ROHO cushion- use at [address] when sitting!!  +True Balance Air group II mattress      General comments for pressure ulcers:  *  Make sure you stay well-hydrated, and maintain good protein intake. *  Reposition at least every two hours, to keep from having pressure in one spot for too long. *  If you are not sure whether you have the best offloading surfaces (mattress, mattress overlay, chair cushion, heel-protector boots, etc), please ask. *  Moisturize your skin regularly with Vaseline, Aquaphor, Aveeno, CeraVe, Cetaphil, Eucerin, Lubriderm, etc; but keep the skin between your toes dry. *  If you smoke, your wound can not heal properly -- please talk with us when you're ready to quit.         F/U Appointment is with Dr. Varinder Stiles in 1 week on                                                at                       .     Your nurse  is ABRAHAM Zambrano      If we applied slip-resistant hospital socks today, be sure to remove them at least once a day to inspect your toes or feet, even if you're not changing the wraps or dressings underneath.  If you see anything concerning (redness, excess moisture, etc), please call and let us know right away.     Should you experience any significant changes in your wound(s) (including redness, increased warmth, increased pain, increased drainage, odor, or fever) or have questions about your wound care, please contact the [address] [place] [address] at [phone] Monday-Thursday from 8:00 am - 4:30 pm, or Friday from 8:00 am - 2:30 pm.  If you need help with your wound outside these hours and cannot wait until we are again available, contact your home-care company (if applicable), your PCP, or go to the nearest emergency room.

## 2021-09-16 ENCOUNTER — HOSPITAL ENCOUNTER (OUTPATIENT)
Age: 71
Discharge: HOME | End: 2021-09-16
Payer: MEDICARE

## 2021-09-16 ENCOUNTER — OFFICE VISIT (OUTPATIENT)
Dept: CARDIOLOGY CLINIC | Age: 71
End: 2021-09-16
Payer: MEDICARE

## 2021-09-16 ENCOUNTER — HOSPITAL ENCOUNTER (OUTPATIENT)
Dept: NON INVASIVE DIAGNOSTICS | Age: 71
Discharge: HOME | End: 2021-09-16
Payer: MEDICARE

## 2021-09-16 VITALS
SYSTOLIC BLOOD PRESSURE: 92 MMHG | WEIGHT: 134 LBS | HEIGHT: 69 IN | BODY MASS INDEX: 19.85 KG/M2 | DIASTOLIC BLOOD PRESSURE: 38 MMHG

## 2021-09-16 DIAGNOSIS — I25.10 CORONARY ARTERY DISEASE INVOLVING NATIVE CORONARY ARTERY OF NATIVE HEART WITHOUT ANGINA PECTORIS: ICD-10-CM

## 2021-09-16 DIAGNOSIS — I50.32 CHRONIC DIASTOLIC CHF (CONGESTIVE HEART FAILURE) (HCC): ICD-10-CM

## 2021-09-16 DIAGNOSIS — I34.0 NONRHEUMATIC MITRAL VALVE REGURGITATION: ICD-10-CM

## 2021-09-16 DIAGNOSIS — I08.0 MITRAL VALVE INSUFFICIENCY AND AORTIC VALVE INSUFFICIENCY: ICD-10-CM

## 2021-09-16 DIAGNOSIS — I50.32 CHRONIC DIASTOLIC CHF (CONGESTIVE HEART FAILURE) (HCC): Primary | ICD-10-CM

## 2021-09-16 LAB
ANION GAP SERPL CALCULATED.3IONS-SCNC: 11 MMOL/L (ref 3–16)
BUN BLDV-MCNC: 21 MG/DL (ref 7–20)
CALCIUM SERPL-MCNC: 9.1 MG/DL (ref 8.3–10.6)
CHLORIDE BLD-SCNC: 93 MMOL/L (ref 99–110)
CO2: 31 MMOL/L (ref 21–32)
CREAT SERPL-MCNC: 0.9 MG/DL (ref 0.6–1.2)
GFR AFRICAN AMERICAN: >60
GFR NON-AFRICAN AMERICAN: >60
GLUCOSE BLD-MCNC: 147 MG/DL (ref 70–99)
POTASSIUM SERPL-SCNC: 4.4 MMOL/L (ref 3.5–5.1)
PRO-BNP: 4412 PG/ML (ref 0–124)
SODIUM BLD-SCNC: 135 MMOL/L (ref 136–145)

## 2021-09-16 PROCEDURE — 99214 OFFICE O/P EST MOD 30 MIN: CPT | Performed by: NURSE PRACTITIONER

## 2021-09-16 PROCEDURE — 36415 COLL VENOUS BLD VENIPUNCTURE: CPT

## 2021-09-16 PROCEDURE — 80048 BASIC METABOLIC PNL TOTAL CA: CPT

## 2021-09-16 PROCEDURE — 93308 TTE F-UP OR LMTD: CPT

## 2021-09-16 PROCEDURE — 83880 ASSAY OF NATRIURETIC PEPTIDE: CPT

## 2021-09-16 RX ORDER — MIDODRINE HYDROCHLORIDE 2.5 MG/1
2.5 TABLET ORAL 3 TIMES DAILY
Qty: 270 TABLET | Refills: 3 | Status: ON HOLD | OUTPATIENT
Start: 2021-09-16 | End: 2021-12-28 | Stop reason: SDUPTHER

## 2021-09-16 RX ORDER — SPIRONOLACTONE 25 MG/1
25 TABLET ORAL DAILY
Qty: 90 TABLET | Refills: 3 | Status: SHIPPED | OUTPATIENT
Start: 2021-09-16 | End: 2022-02-22 | Stop reason: SDUPTHER

## 2021-09-16 NOTE — LETTER
Demario Kitchen  1950  [address]   Cardiology Note              Date:  September 15, 2021  Patientname: Demario Kitchen  YOB: 1950    Primary Care physician: Mario Anand MD    HISTORY OF PRESENT ILLNESS: Demario Kitchen is a 72 y.o. female with a history of CAD, CHF, MR, PVD, DM, DANIELLE, sacral ulcer, COPD, RA. She had right fem-pop bypass 9/2017. She was admitted 4/2021 for chest pain and troponin elevated. [facility]  4/26/2021 showed severe CAD. Echo showed EF 55%. On 5/3/2021 she had CABG x3, BIMAL clip. She required evacuation of hematoma 5/5/2021. She was admitted 7/2021 for cellulitis at vein harvest site requiring I&D on 7/13/2021. She was admitted 8/10/2021 for shortness of breath and treated for CHF. Echo showed EF 50%, possibly severe MR. [facility] 8/16/2021 showed occluded SVG-OM, suspect severe MR due to papillary dysfunction r/t occluded RCA/LCx. Today she presents for follow up for CHF, MR, CAD. She is feeling better. Shortness of breath and edema have improved and she has lost weight. She has no chest pain, palpitations, syncope, dizziness. This week she was worried she was going to run out of lasix so she changed to previous torsemide dose (she had extra torsemide) and has had increased urine output with torsemide. Home BP 90-100s/50s.     Office weight today 9/16/2021: 134 lbs (133 lbs on home scale)  Discharge weight 8/17/2021: 131 lbs    Past Medical History:   has a past medical history of Acute on chronic diastolic heart failure due to coronary artery disease ([address] Acute respiratory failure with hypoxia ([address] Atherosclerosis of native artery of right lower extremity with rest pain ([address] Back pain, Branch retinal vein occlusion, Bronchiectasis with acute exacerbation (HCC), Cellulitis and abscess of left leg, Cellulitis of left lower extremity, Closed compression fracture of thoracic vertebra ([address] Closed fracture of facial bone with routine healing, Closed jaw fracture [facility]), Community acquired pneumonia of left lower lobe of lung, Compression fracture of L1 lumbar vertebra ([address] COPD (chronic obstructive pulmonary disease) ([address] Fracture of tibial plateau, closed, left, initial encounter, Minimally displaced zone I fracture of sacrum ([address] MRSA (methicillin resistant staph aureus) culture positive, MRSA (methicillin resistant Staphylococcus aureus), Mucus plugging of bronchi, Osteomyelitis of mandible, Osteoporosis with pathological fracture, Post herpetic neuralgia, Proximal humerus fracture, Rheumatoid arthritis ([address] Shingles, Sleep apnea, Status post incision and drainage, Temporal arteritis ([address] Tobacco use, Tracheomalacia, and Vitreous hemorrhage, right eye ([address] Past Surgical History:   has a past surgical history that includes hernia repair; Mandible fracture surgery; Foot surgery; Elbow surgery; Cataract removal; Tubal ligation; Knee arthroscopy; Kyphosis surgery; laminectomy; Spinal fusion; Septoplasty (05/07/2013); Artery surgery (05/30/2013); Upper gastrointestinal endoscopy (04/08/2014); bronchoscopy; bronchoscopy (N/A, 06/12/2019); Mandible fracture surgery (02/2020); back surgery (08/2020); other surgical history (01/08/2021); Pressure ulcer debridement (N/A, 01/08/2021); Artery Biopsy (Right, 03/01/2021); Coronary artery bypass graft (N/A, 05/03/2021); Mediastinoscopy (N/A, 05/05/2021); Cardiac surgery (05/03/2021); Leg Surgery (Left, 7/13/2021); and IR MIDLINE CATH (7/14/2021). Home Medications:    Prior to Admission medications    Medication Sig Start Date End Date Taking?  Authorizing Provider   midodrine (PROAMATINE) 2.5 MG tablet Take 2.5 mg by mouth 3 times daily    Historical Provider, MD   furosemide (LASIX) 40 MG tablet Take 1 tablet by mouth daily 8/17/21   Corrine Spears APRN - CNP   spironolactone (ALDACTONE) 25 MG tablet Take 1 tablet by mouth daily 8/18/21   Corrine Spears, APRN - CNP   DULoxetine (CYMBALTA) 60 MG extended release capsule Take 60 mg by mouth daily    Historical Provider, MD   DALIRESP 500 MCG tablet TAKE 1 TABLET BY MOUTH DAILY 6/23/21   Cr López MD   clopidogrel (PLAVIX) 75 MG tablet Take 1 tablet by mouth daily 6/10/21   Pablito Montalvo MD   metoprolol tartrate (LOPRESSOR) 25 MG tablet Take 0.5 tablets by mouth 2 times daily Hold this medication for pulse <33 or systolic BP <501 8/46/71   Pablito Montalvo MD   predniSONE (DELTASONE) 1 MG tablet Take 4 mg by mouth daily    Historical Provider, MD   insulin glargine (LANTUS) 100 UNIT/ML injection vial Inject 15 Units into the skin nightly 5/10/21   LORENZO Diallo - VERNON   oxyCODONE-acetaminophen (PERCOCET)  MG per tablet Take 1 tablet by mouth daily. Historical Provider, MD   docusate sodium (COLACE) 100 MG capsule Take 100 mg by mouth 2 times daily as needed for Constipation    Historical Provider, MD   gabapentin (NEURONTIN) 300 MG capsule Take 300 mg by mouth 2 times daily. Historical Provider, MD   latanoprost (XALATAN) 0.005 % ophthalmic solution Place 1 drop into both eyes nightly    Historical Provider, MD   Teriparatide, Recombinant, (FORTEO) 600 MCG/2.4ML SOPN injection Inject 20 mcg into the skin daily    Historical Provider, MD   NARCAN 4 MG/0.1ML LIQD nasal spray as needed  10/20/20   Historical Provider, MD   morphine (MS CONTIN) 15 MG extended release tablet 15 mg 2 times daily.   10/16/20   Historical Provider, MD   ipratropium (ATROVENT) 0.06 % nasal spray USE 2 SPRAYS BY NASAL ROUTE 2-4 TIMES DAILY 10/29/20   Cr López MD   albuterol sulfate  (90 Base) MCG/ACT inhaler INHALE 2 PUFFS INTO THE LUNGS EVERY 4 HOURS AS NEEDED FOR WHEEZING 1/20/20   Anita Narayanan MD   vitamin D (CHOLECALCIFEROL) 1000 UNIT TABS tablet Take 5,000 Units by mouth daily     Historical Provider, MD   calcium carbonate (OSCAL) 500 MG TABS tablet Take 500 mg by mouth daily    Historical Provider, MD   atorvastatin (LIPITOR) 40 MG tablet Take no thyroidmegaly, JVP <8  Skin:  Warm and dry; no rash or lesions; +sternal incision  Heart: Regular, normal apex, S1 and S2 normal, no M/G/R  Lungs:  Normal respiratory effort; clear; no wheezing/rhonchi/rales  Abdomen: soft, non tender, + bowel sounds  Extremities:  LLE wrapped, + edema, trace RLE edema  Neuro: alert and oriented, moves legs and arms equally, normal mood and affect    Data Reviewed:      Echo 8/12/2021:  -Left ventricular cavity size is enlarged. LVESD=5.57 cm   -Overall left ventricular systolic function appears mildly reduced.   -Ejection fraction is visually estimated to be 50%. -There is mild hypokinesis of the basal to mid inferior and inferolateral   walls. -Grade II diastolic dysfunction with elevated LV filling pressures.   -The right ventricle is mildly enlarged.   -Right ventricular systolic function is normal.   -Mitral valve leaflets appear mildly thickened. -Mitral regurgitation that may be severe. -The aortic valve leaflets appear mildly thickened. -Mild aortic regurgitation.   -Moderate tricuspid regurgitation with a PASP of 57 mmHg.   -Trivial pulmonic regurgitation.     Coronary angiogram 8/16/2021:  Findings:  Artery Findings/Result   LM Normal   LAD Mid 100% after large diagonal, distal supplied by LIMA   Cx 99% ostial, distal supplied by R-L collaterals   RI N/A   RCA 70% distal, % prox   S-O occluded   S-PLB patent   L-LAD  patent   LVEDP 8   LVG NA due to contrast    Intervention:         None   Post Cath Dx:       Severe native disease as above  Occluded S-OM  Suspect severe MR related to papillary dysfunction related to occluded distal RCA and Cx  Consider MVR when wound issues resolve if MR does not improved with medical therapy  Would followup with Dr. Britney Saravia    CABG 5/3/2021:  Urgent CABG X 3, with pedicled LIMA to LAD, single Greater Saphenous VG to PV br of RCA, separate single Greater SVG to OM2,  LAAppendage obliteration with 40 mm Atricure LA Clip, CPB, EVH Left Greater Saphenous vein, ABHISHEK, Epiaortic ultrasound, Doppler verification of grafts, Bilateral 5 level intercostal nerve block(Exparel), Platelet gel application. Sternal plating. Echo 4/24/2021:  Normal left ventricle systolic function with an estimated ejection fraction   of 55%. No regional wall motion abnormalities are seen. Normal left ventricular diastolic filling pressure. Mild eccentric aortic regurgitation. Mild mitral and tricuspid regurgitation. Systolic pulmonary artery pressure (SPAP) is normal and estimated at 27 mmHg   (right atrial pressure 3 mmHg). Cardiology Labs Reviewed:   CBC: No results for input(s): WBC, HGB, HCT, PLT in the last 72 hours. BMP:No results for input(s): NA, K, CO2, BUN, CREATININE, LABGLOM, GLUCOSE in the last 72 hours. PT/INR: No results for input(s): PROTIME, INR in the last 72 hours. APTT:No results for input(s): APTT in the last 72 hours. FASTING LIPID PANEL:  Lab Results   Component Value Date    HDL 34 08/11/2021    HDL 43 07/22/2010    LDLCALC 54 08/11/2021    TRIG 97 08/11/2021     LIVER PROFILE:No results for input(s): AST, ALT, ALB in the last 72 hours.   BNP:   Lab Results   Component Value Date    PROBNP 5,585 08/30/2021    PROBNP 5,729 08/17/2021    PROBNP 9,814 08/15/2021    PROBNP 6,845 08/13/2021    PROBNP 20,636 08/10/2021     Reviewed all labs and imaging today    Assessment:   Chronic diastolic CHF: appears compensated   Mitral regurgitation: severe on echo 8/2021; likely related to papillary dysfunction/occluded dRCA/LCx  CAD: no current angina; severe native disease and occluded SVG-OM on [facility] 8/16/2021: s/p CABG x3 and BIMAL clip 5/3/2021  HTN: low BP noted  History of hypotension: noted during hospitalization 8/2021, on midodrine  HLD: controlled, LDL 54, statin  DM: hgb a1c 7.2  DANIELLE  COPD  Tracheomalacia  Anemia  RA  LLE cellulitis: s/p I&D 7/13/2021 by Dr. Scott Sarkar  Chronic sacral decubitus ulcer: follows with wound clinic    Plan:   1. Check BMP and BNP to ensure renal function tolerating torsemide. 2. Await echo results now that she has diuresed. If MR remains severe, will discuss with Dr. Bueno and consider referral for mitral clip. If MR improved, will decrease torsemide pending renal function, concern she may be dry  3. Continue aspirin, statin, plavix, metoprolol, torsemide, midodrine, spironolactone   4. Plan on adding ARB pending renal function and BP  5. Check BP/weight at home and call the office if consistently out of goal range  6.  Follow up with Dr. Chris Santos, APRN-CNP  [address]  [phone]

## 2021-09-16 NOTE — PATIENT INSTRUCTIONS
Check blood work  Based on blood work and ultrasound, will decide torsemide or lasix  Continue current medications  Have fun in [place]!

## 2021-09-16 NOTE — PROGRESS NOTES
[address]   Cardiology Note              Date:  September 15, 2021  Patientname: Chiqui Villanueva  YOB: 1950    Primary Care physician: Hemal Nielsen MD    HISTORY OF PRESENT ILLNESS: Chiqui Villanueva is a 72 y.o. female with a history of CAD, CHF, MR, PVD, DM, DANIELLE, sacral ulcer, COPD, RA. She had right fem-pop bypass 9/2017. She was admitted 4/2021 for chest pain and troponin elevated. [facility]  4/26/2021 showed severe CAD. Echo showed EF 55%. On 5/3/2021 she had CABG x3, BIMAL clip. She required evacuation of hematoma 5/5/2021. She was admitted 7/2021 for cellulitis at vein harvest site requiring I&D on 7/13/2021. She was admitted 8/10/2021 for shortness of breath and treated for CHF. Echo showed EF 50%, possibly severe MR. [facility] 8/16/2021 showed occluded SVG-OM, suspect severe MR due to papillary dysfunction r/t occluded RCA/LCx. Today she presents for follow up for CHF, MR, CAD. She is feeling better. Shortness of breath and edema have improved and she has lost weight. She has no chest pain, palpitations, syncope, dizziness. This week she was worried she was going to run out of lasix so she changed to previous torsemide dose (she had extra torsemide) and has had increased urine output with torsemide. Home BP 90-100s/50s.     Office weight today 9/16/2021: 134 lbs (133 lbs on home scale)  Discharge weight 8/17/2021: 131 lbs    Past Medical History:   has a past medical history of Acute on chronic diastolic heart failure due to coronary artery disease ([address] Acute respiratory failure with hypoxia ([address] Atherosclerosis of native artery of right lower extremity with rest pain ([address] Back pain, Branch retinal vein occlusion, Bronchiectasis with acute exacerbation ([address] Cellulitis and abscess of left leg, Cellulitis of left lower extremity, Closed compression fracture of thoracic vertebra ([address] Closed fracture of facial bone with routine healing, Closed jaw fracture ([address] Community acquired pneumonia of left lower lobe of lung, Compression fracture of L1 lumbar vertebra (HCC), COPD (chronic obstructive pulmonary disease) [address] Fracture of tibial plateau, closed, left, initial encounter, Minimally displaced zone I fracture of sacrum (HCC), MRSA (methicillin resistant staph aureus) culture positive, MRSA (methicillin resistant Staphylococcus aureus), Mucus plugging of bronchi, Osteomyelitis of mandible, Osteoporosis with pathological fracture, Post herpetic neuralgia, Proximal humerus fracture, Rheumatoid arthritis ([address] Shingles, Sleep apnea, Status post incision and drainage, Temporal arteritis ([address] Tobacco use, Tracheomalacia, and Vitreous hemorrhage, right eye ([address] Past Surgical History:   has a past surgical history that includes hernia repair; Mandible fracture surgery; Foot surgery; Elbow surgery; Cataract removal; Tubal ligation; Knee arthroscopy; Kyphosis surgery; laminectomy; Spinal fusion; Septoplasty (05/07/2013); Artery surgery (05/30/2013); Upper gastrointestinal endoscopy (04/08/2014); bronchoscopy; bronchoscopy (N/A, 06/12/2019); Mandible fracture surgery (02/2020); back surgery (08/2020); other surgical history (01/08/2021); Pressure ulcer debridement (N/A, 01/08/2021); Artery Biopsy (Right, 03/01/2021); Coronary artery bypass graft (N/A, 05/03/2021); Mediastinoscopy (N/A, 05/05/2021); Cardiac surgery (05/03/2021); Leg Surgery (Left, 7/13/2021); and IR MIDLINE CATH (7/14/2021). Home Medications:    Prior to Admission medications    Medication Sig Start Date End Date Taking?  Authorizing Provider   midodrine (PROAMATINE) 2.5 MG tablet Take 2.5 mg by mouth 3 times daily    Historical Provider, MD   furosemide (LASIX) 40 MG tablet Take 1 tablet by mouth daily 8/17/21   LORENZO Layton CNP   spironolactone (ALDACTONE) 25 MG tablet Take 1 tablet by mouth daily 8/18/21   LORENZO Layton CNP   DULoxetine (CYMBALTA) 60 MG extended release capsule Take 60 mg by mouth daily    Historical Provider, MD   DALIRESP 500 MCG tablet TAKE 1 TABLET BY MOUTH DAILY 6/23/21   Goergie Ahumada MD   clopidogrel (PLAVIX) 75 MG tablet Take 1 tablet by mouth daily 6/10/21   Ronak Moreno MD   metoprolol tartrate (LOPRESSOR) 25 MG tablet Take 0.5 tablets by mouth 2 times daily Hold this medication for pulse <37 or systolic BP <869 9/98/10   Ronak Moreno MD   predniSONE (DELTASONE) 1 MG tablet Take 4 mg by mouth daily    Historical Provider, MD   insulin glargine (LANTUS) 100 UNIT/ML injection vial Inject 15 Units into the skin nightly 5/10/21   [place], APRN - CNP   oxyCODONE-acetaminophen (PERCOCET)  MG per tablet Take 1 tablet by mouth daily. Historical Provider, MD   docusate sodium (COLACE) 100 MG capsule Take 100 mg by mouth 2 times daily as needed for Constipation    Historical Provider, MD   gabapentin (NEURONTIN) 300 MG capsule Take 300 mg by mouth 2 times daily. Historical Provider, MD   latanoprost (XALATAN) 0.005 % ophthalmic solution Place 1 drop into both eyes nightly    Historical Provider, MD   Teriparatide, Recombinant, (FORTEO) 600 MCG/2.4ML SOPN injection Inject 20 mcg into the skin daily    Historical Provider, MD   NARCAN 4 MG/0.1ML LIQD nasal spray as needed  10/20/20   Historical Provider, MD   morphine (MS CONTIN) 15 MG extended release tablet 15 mg 2 times daily.   10/16/20   Historical Provider, MD   ipratropium (ATROVENT) 0.06 % nasal spray USE 2 SPRAYS BY NASAL ROUTE 2-4 TIMES DAILY 10/29/20   Georgie Ahumada MD   albuterol sulfate  (90 Base) MCG/ACT inhaler INHALE 2 PUFFS INTO THE LUNGS EVERY 4 HOURS AS NEEDED FOR WHEEZING 1/20/20   Zenaida Clinton MD   vitamin D (CHOLECALCIFEROL) 1000 UNIT TABS tablet Take 5,000 Units by mouth daily     Historical Provider, MD   calcium carbonate (OSCAL) 500 MG TABS tablet Take 500 mg by mouth daily    Historical Provider, MD   atorvastatin (LIPITOR) 40 MG tablet Take 40 mg by mouth 10/16/18   Historical Provider, MD   cyclobenzaprine (FLEXERIL) 10 MG tablet Take 10 mg by mouth 2 times daily as needed     Historical Provider, MD   Insulin Syringe-Needle U-100 31G X 5/16\" 0.5 ML MISC USE 5 TIMES DAILY 5/30/18   Historical Provider, MD   [address] [facility] [address] strip TEST 4 TIMES DAILY 6/1/18   Historical Provider, MD   aspirin EC 81 MG EC tablet Take 1 tablet by mouth daily 10/23/17   Yulisa Fragoso MD   insulin lispro (HUMALOG) 100 UNIT/ML injection vial Inject 0-12 Units into the skin 3 times daily (with meals) 10/23/17   Yulisa Fragoso MD   Misc. Devices (ACAPELLA) MISC Take 1 Device by mouth as needed 9/2/15   [place] Smoker, APRN - CNP   fluticasone [facility]) 50 MCG/ACT nasal spray INHALE 2 SPRAYS IN [facility] NOSTRIL DAILY 11/25/13   Georgie Ahumada MD   cetirizine (ZYRTEC) 10 MG tablet Take 10 mg by mouth daily. Historical Provider, MD   omeprazole (PRILOSEC) 40 MG capsule Take 40 mg by mouth daily     Historical Provider, MD     Allergies:  Atenolol    Social History:   reports that she quit smoking about 30 years ago. Her smoking use included cigarettes. She has a 20.00 pack-year smoking history. She has never used smokeless tobacco. She reports previous alcohol use. She reports that she does not use drugs. Family History: family history includes Asthma in an other family member; Diabetes in her brother, mother, and sister; Heart Disease in her brother and brother; Hypertension in her mother. Review of Systems   Review of Systems   Constitutional: Negative. Respiratory: Negative. Cardiovascular: Positive for leg swelling. Negative for chest pain and palpitations. Neurological: Negative.       OBJECTIVE:    Vital signs:    BP (!) 92/38   Ht 5' 9\" (1.753 m)   Wt 134 lb (60.8 kg)   BMI 19.79 kg/m²      Physical Exam:  Constitutional:  Comfortable and alert, NAD, appears older than stated age, frail  Eyes: PERRL, sclera nonicteric  Neck:  Supple, no masses, no thyroidmegaly, JVP <8  Skin:  Warm and dry; no rash or lesions; +sternal incision  Heart: Regular, normal apex, S1 and S2 normal, no M/G/R  Lungs:  Normal respiratory effort; clear; no wheezing/rhonchi/rales  Abdomen: soft, non tender, + bowel sounds  Extremities:  LLE wrapped, + edema, trace RLE edema  Neuro: alert and oriented, moves legs and arms equally, normal mood and affect    Data Reviewed:      Echo 8/12/2021:  -Left ventricular cavity size is enlarged. LVESD=5.57 cm   -Overall left ventricular systolic function appears mildly reduced.   -Ejection fraction is visually estimated to be 50%. -There is mild hypokinesis of the basal to mid inferior and inferolateral   walls. -Grade II diastolic dysfunction with elevated LV filling pressures.   -The right ventricle is mildly enlarged.   -Right ventricular systolic function is normal.   -Mitral valve leaflets appear mildly thickened. -Mitral regurgitation that may be severe. -The aortic valve leaflets appear mildly thickened. -Mild aortic regurgitation.   -Moderate tricuspid regurgitation with a PASP of 57 mmHg.   -Trivial pulmonic regurgitation.     Coronary angiogram 8/16/2021:  Findings:  Artery Findings/Result   LM Normal   LAD Mid 100% after large diagonal, distal supplied by LIMA   Cx 99% ostial, distal supplied by R-L collaterals   RI N/A   RCA 70% distal, % prox   S-O occluded   S-PLB patent   L-LAD  patent   LVEDP 8   LVG NA due to contrast    Intervention:         None   Post Cath Dx:       Severe native disease as above  Occluded S-OM  Suspect severe MR related to papillary dysfunction related to occluded distal RCA and Cx  Consider MVR when wound issues resolve if MR does not improved with medical therapy  Would followup with Dr. Junior Kang    CABG 5/3/2021:  Urgent CABG X 3, with pedicled LIMA to LAD, single Greater Saphenous VG to PV br of RCA, separate single Greater SVG to OM2,  LAAppendage obliteration with 40 mm Atricure LA Clip, CPB, EVH Left Greater Saphenous vein, ABHISHEK, Epiaortic ultrasound, Doppler verification of grafts, Bilateral 5 level intercostal nerve block(Exparel), Platelet gel application. Sternal plating. Echo 4/24/2021:  Normal left ventricle systolic function with an estimated ejection fraction   of 55%. No regional wall motion abnormalities are seen. Normal left ventricular diastolic filling pressure. Mild eccentric aortic regurgitation. Mild mitral and tricuspid regurgitation. Systolic pulmonary artery pressure (SPAP) is normal and estimated at 27 mmHg   (right atrial pressure 3 mmHg). Cardiology Labs Reviewed:   CBC: No results for input(s): WBC, HGB, HCT, PLT in the last 72 hours. BMP:No results for input(s): NA, K, CO2, BUN, CREATININE, LABGLOM, GLUCOSE in the last 72 hours. PT/INR: No results for input(s): PROTIME, INR in the last 72 hours. APTT:No results for input(s): APTT in the last 72 hours. FASTING LIPID PANEL:  Lab Results   Component Value Date    HDL 34 08/11/2021    HDL 43 07/22/2010    LDLCALC 54 08/11/2021    TRIG 97 08/11/2021     LIVER PROFILE:No results for input(s): AST, ALT, ALB in the last 72 hours. BNP:   Lab Results   Component Value Date    PROBNP 5,585 08/30/2021    PROBNP 5,729 08/17/2021    PROBNP 9,814 08/15/2021    PROBNP 6,845 08/13/2021    PROBNP 20,636 08/10/2021     Reviewed all labs and imaging today    Assessment:   Chronic diastolic CHF: appears compensated   Mitral regurgitation: severe on echo 8/2021; likely related to papillary dysfunction/occluded dRCA/LCx  CAD: no current angina; severe native disease and occluded SVG-OM on [facility] 8/16/2021: s/p CABG x3 and BIMAL clip 5/3/2021  HTN: low BP noted  History of hypotension: noted during hospitalization 8/2021, on midodrine  HLD: controlled, LDL 54, statin  DM: hgb a1c 7.2  DANIELLE  COPD  Tracheomalacia  Anemia  RA  LLE cellulitis: s/p I&D 7/13/2021 by Dr. Oren Mauricio  Chronic sacral decubitus ulcer: follows with wound clinic    Plan:   1.  Check BMP and BNP to ensure renal function tolerating torsemide. 2. Await echo results now that she has diuresed. If MR remains severe, will discuss with Dr. Donnie Avalos and consider referral for mitral clip. If MR improved, will decrease torsemide pending renal function, concern she may be dry  3. Continue aspirin, statin, plavix, metoprolol, torsemide, midodrine, spironolactone   4. Plan on adding ARB pending renal function and BP  5. Check BP/weight at home and call the office if consistently out of goal range  6.  Follow up with Dr. Nogueira Pulse, APRN-CNP  [address]  [phone]

## 2021-09-17 ENCOUNTER — TELEPHONE (OUTPATIENT)
Dept: CARDIOLOGY CLINIC | Age: 71
End: 2021-09-17

## 2021-09-17 ENCOUNTER — TELEPHONE (OUTPATIENT)
Dept: WOUND CARE | Age: 71
End: 2021-09-17

## 2021-09-17 RX ORDER — METOPROLOL SUCCINATE 25 MG/1
12.5 TABLET, EXTENDED RELEASE ORAL DAILY
Qty: 45 TABLET | Refills: 3 | Status: SHIPPED | OUTPATIENT
Start: 2021-09-17 | End: 2022-02-22 | Stop reason: SDUPTHER

## 2021-09-17 RX ORDER — TORSEMIDE 20 MG/1
40 TABLET ORAL DAILY
Qty: 60 TABLET | Refills: 5 | Status: SHIPPED | OUTPATIENT
Start: 2021-09-17 | End: 2022-02-22 | Stop reason: SDUPTHER

## 2021-09-17 NOTE — TELEPHONE ENCOUNTER
returned call to ZEINA, whom questions if a treating LPN had called to inform that the NPWT dressing supplies had not been ordered until today after a supervisor had gotten involved. ZEINA also questioned if the treating LPN had called to get dressing orders, since she was unable to apply the NPWT d/t no dressing kits. Writer advised this is the first phone call from [address] [place] [address] that [facility] had received. ZEINA advised that they applied NS moist gauze to wound & will restart NPWT as soon as they get the dressing supplies. Dr Hailee Zambrano informed of above.

## 2021-09-17 NOTE — TELEPHONE ENCOUNTER
Called and spoke with patient regarding echo and lab results. Mitral regurgitation has improved with diuresis, now moderate, however EF appeared lower. I would like her to move up appointment with Dr. Alicia Huang to discuss further. She will continue torsemide 40 mg daily. If weight <131 lbs, she will decrease to 20 mg daily. Will change lopressor to toprol due to cardiomyopathy. Unable to add ACE/ARB due to lower BP. Staff- please call patient and move Dr. Alicia Huang appointment to 10/2021. Thank you!

## 2021-09-19 NOTE — PROGRESS NOTES
[facility] Progress Note    Carlito Le     : 1950    DATE OF VISIT:  9/15/2021    Subjective:     Carlito Le is a 72 y.o. female who has a pressure ulcer located on the sacrum. Significant symptoms or pertinent wound history since last visit: feeling ok overall, not much pain, no fever, no problems with NPWT. Eating well, definitely offloading better. Might be out of town for her appt in 2 weeks -- won't have an issue with this ulcer, with her family able to do the NPWT for a week, but we'll need a plan for her lower leg. Additional ulcer(s) noted? yes. Left lower leg surgical site. Her current medication list consists of Acapella, DULoxetine, Insulin Syringe-Needle U-100, Roflumilast, Teriparatide (Recombinant), albuterol sulfate HFA, aspirin EC, atorvastatin, blood glucose test strips, calcium carbonate, cetirizine, clopidogrel, cyclobenzaprine, docusate sodium, fluticasone, gabapentin, insulin glargine, insulin lispro, ipratropium, latanoprost, metoprolol succinate, midodrine, morphine, naloxone, omeprazole, oxyCODONE-acetaminophen, predniSONE, spironolactone, torsemide, and vitamin D. Allergies: Atenolol    Objective:     Vitals:    09/15/21 0916   BP: 125/72   Pulse: 100   Resp: 18   Temp: 97.8 °F (36.6 °C)   TempSrc: Oral   Weight: 133 lb 3.2 oz (60.4 kg)   Height: 5' 9\" (1.753 m)     AAOx3, fatigued, NAD  Intact distal pulses, feet warm, good cap refill  Mild LE edema  No cellulitis, angitis, fluctuance  No acute arthritis or bursitis  No contact dermatitis or cutaneous Candidiasis  Blanca-ulcer skin: indurated, pink, warm and dry.   Ulcer(s): left leg smaller, red, a bit hypergranular again, mildly inflamed, clean; sacral ulcer with some good granulation over most of the visible surfaces, but still that pocket of superior undermining where the tissue had been more fibrotic - that is looking a bit more pink and red this week again though, no definite SQ necrosis, usual layer of fibrin and biofilm, no signs of active infection, no bone exposed. Photos also saved in electronic chart.     Today's wound measurements, per RN documentation:  Wound 07/07/21 #3, left medial leg, dehisced surgical, full thickness, onset 5/2021-Wound Length (cm): 4.5 cm  Wound 12/18/20 #2, Sacrum, Pressure Injury, Stage 4, Onset 5/2020-Wound Length (cm): 2.5 cm    Wound 07/07/21 #3, left medial leg, dehisced surgical, full thickness, onset 5/2021-Wound Width (cm): 1.3 cm  Wound 12/18/20 #2, Sacrum, Pressure Injury, Stage 4, Onset 5/2020-Wound Width (cm): 0.8 cm    Wound 07/07/21 #3, left medial leg, dehisced surgical, full thickness, onset 5/2021-Wound Depth (cm): 0.5 cm  Wound 12/18/20 #2, Sacrum, Pressure Injury, Stage 4, Onset 5/2020-Wound Depth (cm): 0.8 cm    Assessment:     Patient Active Problem List   Diagnosis Code    [facility] M06.9    Psoriasis L40.9    GERD (gastroesophageal reflux disease) K21.9    Anemia D64.9    Cylindrical bronchiectasis ([facility]) J47.9    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    Immunocompromised state ([facility] D84.9    Coronary artery disease I25.10    Bilateral lower extremity edema R60.0    Essential hypertension I10    Lumbar spondylosis M47.816    Mitral valve insufficiency and aortic valve insufficiency I08.0    Mixed hyperlipidemia E78.2    Myopia of both eyes H52.13    Osteoporosis M81.0    Other chronic sinusitis J32.8    Primary open angle glaucoma (POAG) of both eyes, mild stage H40.1131    Primary osteoarthritis of right hip M16.11    Type 2 diabetes mellitus with unspecified diabetic retinopathy without macular edema ([facility]) E11.319    Type 2 diabetes mellitus with diabetic peripheral angiopathy without gangrene, with long-term current use of insulin ([facility]) E11.51, Z79.4    Pressure ulcer of coccygeal region, stage 4 ([facility]) L89.154    NSTEMI (non-ST elevated myocardial infarction) ([facility] I21.4    Hx of CABG Z95.1    Mild malnutrition ([facility] E44.1    Surgical wound dehiscence, initial encounter (at [facility] SVG harvest site) T81.31XA    Chronic obstructive pulmonary disease (HCC) J44.9    Hypergranulation L92.9       Assessment of today's active condition(s): RA, Hx back surgery, poor mobility, very slowly healing stage 4 pressure ulcer, no signs of recent infection. Also CABG earlier this summer, SVG harvest, wound necrosis, infection and dehiscence there, taken to the OR for infection, and that site is healing very well overall. Factors contributing to occurrence and/or persistence of the chronic ulcer include edema, venous stasis, diabetes, chronic pressure, decreased mobility, shear force and immunosuppression. Medical necessity of today's visit is shown by the above documentation. Sharp debridement is indicated today, based upon the exam findings in the wound(s) above. Procedure note:     Consent obtained. Time out performed per [facility]. Anesthetic  Anesthetic: 4% Lidocaine Cream     Using a curette, I sharply debrided the sacrum ulcer(s) down through and including the removal of dermis. The type(s) of tissue debrided included fibrin, biofilm and necrotic/eschar. Total Surface Area Debrided: 2 sq cm. The ulcers were then irrigated with normal saline solution. The procedure was completed with a small amount of bleeding, and hemostasis was with pressure. The patient tolerated the procedure well, with no significant complications. The patient's level of pain during and after the procedure was monitored. Post-debridement measurements, if different from pre-debridement, are in the flowsheet as well.  ____________    To encourage better epithelial cell coverage, I did use AgNO3 to chemically cauterize hypergranulation tissue on the left  lower leg ulcer(s), after application of 4% lidocaine topical solution. This was tolerated well, with no pain or skin injury.      Discharge plan:     Treatment in the wound care center today, per RN documentation: Wound 07/07/21 #3, left medial leg, dehisced surgical, full thickness, onset 5/2021-Dressing/Treatment: Other (comment) (Procellera, Hydrogel, 4x4's, Compri 2 Lite)  Wound 12/18/20 #2, Sacrum, Pressure Injury, Stage 4, Onset 5/2020-Dressing/Treatment: Other (comment) (Opticel Ag, Sacral Border). Per physician order, left application of multilayer compression wrap was performed in the wound care center today, to help manage edema, stasis dermatitis, and/or venous ulcers. Leave primary dressing and multi-layer wrap(s) in place until the next appointment. Also discussed ways to keep the wrap dry for a shower, including a plastic cast-guard, available in retail pharmacies. She should call before her next visit if there is any significant pain, significant strike-through of drainage to the surface of the wrap, or any significant sense of the wrap sliding down more than an inch or so, bunching-up and abrading her skin. I reminded the patient of the importance of weight management and smoking cessation, if applicable; also encouraged ambulation as tolerated, additional lower extremity exercises as instructed in our education sheet, leg elevation when at rest, and compliance with any recommended dietary, diuretic and compression therapies. Continue to work on offloading, protein intake, glucose control; pausing her Enbrel for now, for as long as she can tolerate, to try to support quicker wound healing. Home treatment: good handwashing before and after any dressing changes. Cleanse wound with saline or soap & water before dressing change. May use Vaseline (petrolatum), Aquaphor, Aveeno, CeraVe, Cetaphil, Eucerin, Lubriderm, etc for dry skin. Dressing type for home: for the sacrum, Vashe, collagen, AMD gauze into the undermining, then standard VAC foam, 125 mmHg pressure, three times weekly. Written discharge instructions given to patient. Follow up in 1 week.     Electronically

## 2021-09-22 ENCOUNTER — HOSPITAL ENCOUNTER (OUTPATIENT)
Dept: WOUND CARE | Age: 71
Discharge: HOME | End: 2021-09-22
Payer: MEDICARE

## 2021-09-22 VITALS
BODY MASS INDEX: 19.99 KG/M2 | WEIGHT: 135 LBS | HEIGHT: 69 IN | TEMPERATURE: 97.9 F | HEART RATE: 85 BPM | RESPIRATION RATE: 18 BRPM | SYSTOLIC BLOOD PRESSURE: 127 MMHG | DIASTOLIC BLOOD PRESSURE: 73 MMHG

## 2021-09-22 DIAGNOSIS — T81.31XA SURGICAL WOUND DEHISCENCE, INITIAL ENCOUNTER: Primary | ICD-10-CM

## 2021-09-22 DIAGNOSIS — L89.154 PRESSURE ULCER OF COCCYGEAL REGION, STAGE 4 (HCC): ICD-10-CM

## 2021-09-22 PROCEDURE — 29581 APPL MULTLAYER CMPRN SYS LEG: CPT

## 2021-09-22 PROCEDURE — 29581 APPL MULTLAYER CMPRN SYS LEG: CPT | Performed by: INTERNAL MEDICINE

## 2021-09-22 PROCEDURE — 97597 DBRDMT OPN WND 1ST 20 CM/<: CPT | Performed by: INTERNAL MEDICINE

## 2021-09-22 PROCEDURE — 17250 CHEM CAUT OF GRANLTJ TISSUE: CPT

## 2021-09-22 PROCEDURE — 97597 DBRDMT OPN WND 1ST 20 CM/<: CPT

## 2021-09-22 PROCEDURE — 17250 CHEM CAUT OF GRANLTJ TISSUE: CPT | Performed by: INTERNAL MEDICINE

## 2021-09-22 RX ORDER — BACITRACIN ZINC AND POLYMYXIN B SULFATE 500; 1000 [USP'U]/G; [USP'U]/G
OINTMENT TOPICAL ONCE
Status: DISCONTINUED | OUTPATIENT
Start: 2021-09-22 | End: 2021-09-23 | Stop reason: HOSPADM

## 2021-09-22 RX ORDER — BACITRACIN ZINC AND POLYMYXIN B SULFATE 500; 1000 [USP'U]/G; [USP'U]/G
OINTMENT TOPICAL ONCE
Status: CANCELLED | OUTPATIENT
Start: 2021-09-22 | End: 2021-09-22

## 2021-09-22 RX ORDER — LIDOCAINE HYDROCHLORIDE 40 MG/ML
SOLUTION TOPICAL ONCE
Status: CANCELLED | OUTPATIENT
Start: 2021-09-22 | End: 2021-09-22

## 2021-09-22 RX ORDER — LIDOCAINE 40 MG/G
CREAM TOPICAL ONCE
Status: DISCONTINUED | OUTPATIENT
Start: 2021-09-22 | End: 2021-09-23 | Stop reason: HOSPADM

## 2021-09-22 RX ORDER — LIDOCAINE 50 MG/G
OINTMENT TOPICAL ONCE
Status: CANCELLED | OUTPATIENT
Start: 2021-09-22 | End: 2021-09-22

## 2021-09-22 RX ORDER — LIDOCAINE 40 MG/G
CREAM TOPICAL ONCE
Status: CANCELLED | OUTPATIENT
Start: 2021-09-22 | End: 2021-09-22

## 2021-09-22 ASSESSMENT — PAIN DESCRIPTION - LOCATION: LOCATION: BUTTOCKS;COCCYX

## 2021-09-22 ASSESSMENT — PAIN DESCRIPTION - DESCRIPTORS: DESCRIPTORS: BURNING

## 2021-09-22 ASSESSMENT — PAIN - FUNCTIONAL ASSESSMENT: PAIN_FUNCTIONAL_ASSESSMENT: PREVENTS OR INTERFERES SOME ACTIVE ACTIVITIES AND ADLS

## 2021-09-22 ASSESSMENT — PAIN DESCRIPTION - ONSET: ONSET: ON-GOING

## 2021-09-22 ASSESSMENT — PAIN SCALES - GENERAL: PAINLEVEL_OUTOF10: 5

## 2021-09-22 ASSESSMENT — PAIN DESCRIPTION - FREQUENCY: FREQUENCY: INTERMITTENT

## 2021-09-22 ASSESSMENT — PAIN DESCRIPTION - PROGRESSION: CLINICAL_PROGRESSION: NOT CHANGED

## 2021-09-22 ASSESSMENT — PAIN DESCRIPTION - PAIN TYPE: TYPE: CHRONIC PAIN

## 2021-09-22 NOTE — PLAN OF CARE
[facility] Physician Orders and Discharge [address], [address] [facility]  Telephone: [phone]      Fax: [phone]        Your home care Madiha Escobedo     Your wound-care supplies will be provided by: We are ordering your wound-care supplies from [employer]. Please note, depending on your insurance coverage, you may have out-of-pocket expenses for these supplies. Someone from [facility] should call you to confirm your order and discuss those potential costs before they ship your products -- please anticipate that call. If your out-of-pocket cost could be substantial, [facility] has a financial hardship program for patients who qualify, so please ask about that if you might need a hand. If you have any questions about your supplies or your potential out-of-pocket costs, or if you need to place an order for a refill of supplies (typically monthly), please call [phone].      NAME:  Gris Taveras   DATE of BIRTH:  1950  PRIMARY DIAGNOSIS FOR WOUND CARE CENTER:  Pressure ulcer     Wound cleansing:   Do not scrub or use excessive force. Wash hands with soap and water before and after dressing changes. Prior to applying a clean dressing, cleanse wound with normal saline, wound cleanser, or mild soap and water. Ask your physician or nurse before getting the wound(s) wet in the shower.                Wound care for home:     Abdominal Rash:   Triamcinolone in [address] [place] [address] today only     LEFT MEDIAL LOWER LEG WOUND:  Dr. Tatum Chew used Silver Nitrate on your wound today.  The wound will be black or silver in appearance  for the next several days, this is normal.      Vashe to wound  Procellera moistened with saline, then Hydrogel  Cover with 4x4's  Compri 2 lite or coban 2 lite compression wrap  Single layer light compression spandagrip to help hold in place  Leave in place until next visit.  Keep clean, dry & intact. If you have to remove wrap prior to next visit apply:     Collagen  4x4's, cast padding, ace wrap        Your physician has ordered Compression for treatment of venous ulcers, venous insufficiency,and/or Lymphedema.   * Do not remove until your next scheduled visit in [facility]- do not get wet.    * If your wrap falls down, becomes painful or you have decreased sensation, and/or excessive drainage- please call the [facility] for RN visit to get your compression wrap changed   * You need to exercice as tolerated- walking is good   * Elevate your legs preferrably above level of your heart when sitting as much as possible   * The Goal of this therapy is to reduce Edema and get into long term compression garments to control venous insufficiency, lymphedema and reduce occurrence of venous ulcers        Sacral Wound:   Dr. Jus Brewer used Silver Nitrate on your wound today.   The wound will be black or silver in appearance  for the next several days, this is normal.   Skin prep & Hydrocolloid to maegan-wound skin irritation to protect from drape   Vashe or Hypochlorous acid soaked gauze applied to wound for 2-3 minutes, no need to rinse  Flagyl crushed & sprinkled on wound bed as needed for malodor  Stoma paste or Stoma barrier ring to distal area of wound to help obtain a better seal & prevent air leak  DRAPE to maegan-wound & UNDER ANY FOAM FOR BRIDGE  Gently tuck AMD gauze into undermining at 12-2 o'clock=2 cm  Collagen to remaining wound base (send remainder home with patient today)  Black foam-IT HAS TO BE ON THE ACTUAL WOUND & bridged to left hip (MAKE SURE NOT TO OVER FILL UNDERMINED AREA WITH BLACK FOAM)  Cover with drape  Pressure -125 mmHg continuous  HHC to change dressing on Friday, Monday & Wednesday         Please note, all wounds (unless stated otherwise here) were mechanically debrided at the time of cleansing here in the wound-care center today, so a small amount of pain, drainage or bleeding from that process might be expected, and is normal.      All products for home use, including multiple products for a single wound if applicable, are medically necessary in order to achieve the best chance at timely wound healing. See provider documentation for details if needed.     Substituted dressings applied in the [address] today, if applicable:     Sacral wound:Triamcinolone to maegan-wound rash,  Vashe spray, Opticell Ag, Cover with sacral border today in [address] only     New orders for this week (labs, imaging, medications, etc.):     BE SURE TO START GENTLY TUCKING AMD GAUZE INTO UNDERMINED AREAS AS DIRECTED ABOVE.       Home Care to order dressing supplies through [employer] [facility] when needed. May contact the local rep. For any questions or to order supplies  Florencia Ann # [phone]      May use over the counter hydrocortisone to abdominal and sacral rash    Additional instructions for specific diagnoses:     +ROHO cushion- use at ALL TIMES when sitting!!  +True Balance Air group II mattress      General comments for pressure ulcers:  *  Make sure you stay well-hydrated, and maintain good protein intake. *  Reposition at least every two hours, to keep from having pressure in one spot for too long. *  If you are not sure whether you have the best offloading surfaces (mattress, mattress overlay, chair cushion, heel-protector boots, etc), please ask. *  Moisturize your skin regularly with Vaseline, Aquaphor, Aveeno, CeraVe, Cetaphil, Eucerin, Lubriderm, etc; but keep the skin between your toes dry.   *  If you smoke, your wound can not heal properly -- please talk with us when you're ready to quit.         F/U Appointment is with Dr. Marita Rogers in 2 week on                                                at                       .     Your nurse  is Kenya RN      If we applied slip-resistant hospital socks today, be sure to remove them at least once a day to inspect your toes or feet, even if you're not changing the wraps or dressings underneath. If you see anything concerning (redness, excess moisture, etc), please call and let us know right away.     Should you experience any significant changes in your wound(s) (including redness, increased warmth, increased pain, increased drainage, odor, or fever) or have questions about your wound care, please contact the [place] [address] at [phone] Monday-Thursday from 8:00 am - 4:30 pm, or Friday from 8:00 am - 2:30 pm.  If you need help with your wound outside these hours and cannot wait until we are again available, contact your home-care company (if applicable), your PCP, or go to the nearest emergency room.

## 2021-09-22 NOTE — PLAN OF CARE
Patient seen in [facility] today. Wounds showing improvement this week. Follow up in [address] [place] [address] in 2 weeks due to Patient being out of town next week. Pt. Aware to call sooner with any problems or questions/concerns.  MD orders/D/C instructions reviewed with patient, all questions answered; copy of instructions given to patient

## 2021-09-26 NOTE — PROGRESS NOTES
[facility] Progress Note    Lou Boone     : 1950    DATE OF VISIT:  2021    Subjective:     Lou Boone is a 72 y.o. female who has a pressure ulcer located on the sacrum. Significant symptoms or pertinent wound history since last visit: has not had NPWT for about a week because somehow replacement dressing kits have either not been ordered by home-care, not yet delivered by [employer], etc. Has been using collagen dressings. Offloading well, eating pretty well. Joint pain not too bad. Hoping to be out of the area for vacation next week. Additional ulcer(s) noted? yes. Left lower leg surgical dehiscence, doing well. Her current medication list consists of Acapella, DULoxetine, Insulin Syringe-Needle U-100, Roflumilast, Teriparatide (Recombinant), albuterol sulfate HFA, aspirin EC, atorvastatin, blood glucose test strips, calcium carbonate, cetirizine, clopidogrel, cyclobenzaprine, docusate sodium, fluticasone, gabapentin, insulin glargine, insulin lispro, ipratropium, latanoprost, metoprolol succinate, midodrine, morphine, naloxone, omeprazole, oxyCODONE-acetaminophen, predniSONE, spironolactone, torsemide, and vitamin D. Allergies: Atenolol    Objective:     Vitals:    21 0918   BP: 127/73   Pulse: 85   Resp: 18   Temp: 97.9 °F (36.6 °C)   TempSrc: Oral   Weight: 135 lb (61.2 kg)   Height: 5' 9\" (1.753 m)     AAOx3, fatigued, NAD  Intact distal pulses, feet warm, good cap refill  Mild-mod LE edema  No cellulitis, angitis, fluctuance  No acute arthritis or bursitis  No contact dermatitis or cutaneous Candidiasis  Blanca-ulcer skin: indurated, pink, warm and dry. Less indurated at the leg.   Ulcer(s): left leg smaller, red, a bit hypergranular again, mildly inflamed, clean; sacral ulcer with some good granulation over most of the visible surfaces, but still that pocket of superior undermining where the tissue had been more fibrotic - that is looking a bit more pink and red this week again though, I think not quite as deep, no definite SQ necrosis, usual layer of fibrin and biofilm, no signs of active infection, no bone exposed -- it seems like one area of the wound was definitely doing better with NPWT, but another area almost looks better without it (inferior, where the granulation is right up to a very healthy epidermal edge. Photos also saved in electronic chart.     Today's wound measurements, per RN documentation:  Wound 07/07/21 #3, left medial leg cluster, dehisced surgical, full thickness, onset 5/2021-Wound Length (cm): 6.2 cm  Wound 12/18/20 #2, Sacrum, Pressure Injury, Stage 4, Onset 5/2020-Wound Length (cm): 2.7 cm    Wound 07/07/21 #3, left medial leg cluster, dehisced surgical, full thickness, onset 5/2021-Wound Width (cm): 1 cm  Wound 12/18/20 #2, Sacrum, Pressure Injury, Stage 4, Onset 5/2020-Wound Width (cm): 0.8 cm    Wound 07/07/21 #3, left medial leg cluster, dehisced surgical, full thickness, onset 5/2021-Wound Depth (cm): 0.3 cm  Wound 12/18/20 #2, Sacrum, Pressure Injury, Stage 4, Onset 5/2020-Wound Depth (cm): 1.4 cm    Assessment:     Patient Active Problem List   Diagnosis Code    [facility] M06.9    Psoriasis L40.9    GERD (gastroesophageal reflux disease) K21.9    Anemia D64.9    Cylindrical bronchiectasis (HCC) J47.9    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    Immunocompromised state ([facility] D84.9    Coronary artery disease I25.10    Bilateral lower extremity edema R60.0    Essential hypertension I10    Lumbar spondylosis M47.816    Mitral valve insufficiency and aortic valve insufficiency I08.0    Mixed hyperlipidemia E78.2    Myopia of both eyes H52.13    Osteoporosis M81.0    Other chronic sinusitis J32.8    Primary open angle glaucoma (POAG) of both eyes, mild stage H40.1131    Primary osteoarthritis of right hip M16.11    Type 2 diabetes mellitus with unspecified diabetic retinopathy without macular edema (HCC) E11.319    Type 2 diabetes mellitus with diabetic peripheral angiopathy without gangrene, with long-term current use of insulin ([facility]) E11.51, Z79.4    Pressure ulcer of coccygeal region, stage 4 ([facility]) L89.154    NSTEMI (non-ST elevated myocardial infarction) ([facility]) I21.4    Hx of CABG Z95.1    Mild malnutrition ([facility]) E44.1    Surgical wound dehiscence, initial encounter (at [facility] SVG harvest site) T81.31XA    Chronic obstructive pulmonary disease ([facility]) J44.9    Hypergranulation L92.9       Assessment of today's active condition(s): RA, Hx back surgery, poor mobility, slowly healing stage 4 pressure ulcer of sacrum, no signs of infection; offloading definitely better, and not completely certain how much the NPWT is helping right now, but for that superior undermined pocket, I think it still has a role. Also CAD, recent CABG, SVG harvest, necrosis and dehiscence and infection of that left leg site, taken back to OR for wide excisional debridement, completed Abx, doing very well (healing a bit slow from edema). Factors contributing to occurrence and/or persistence of the chronic ulcer include edema, venous stasis, diabetes, chronic pressure, decreased mobility, shear force and immunosuppression. Medical necessity of today's visit is shown by the above documentation. Sharp debridement is indicated today, based upon the exam findings in the wound(s) above. Procedure note:     Consent obtained. Time out performed per [facility]. Anesthetic  Anesthetic: 4% Lidocaine Cream     Using a curette, I sharply debrided the sacrum ulcer(s) down through and including the removal of dermis. The type(s) of tissue debrided included fibrin, biofilm and necrotic/eschar. Total Surface Area Debrided: 2 sq cm. The ulcers were then irrigated with normal saline solution. The procedure was completed with a small amount of bleeding, and hemostasis was with pressure.  The patient tolerated the procedure well, with no significant complications. The patient's level of pain during and after the procedure was monitored. Post-debridement measurements, if different from pre-debridement, are in the flowsheet as well.  ______________    To encourage better epithelial cell coverage, I did use AgNO3 to chemically cauterize hypergranulation tissue on the left  lower leg ulcer(s), after application of 4% lidocaine topical solution. This was tolerated well, with no pain or skin injury. Discharge plan:     Treatment in the wound care center today, per RN documentation: Wound 07/07/21 #3, left medial leg cluster, dehisced surgical, full thickness, onset 5/2021-Dressing/Treatment: Other (comment) (procelleral 4x4's Coban 2 lite spandagrip G)  Wound 12/18/20 #2, Sacrum, Pressure Injury, Stage 4, Onset 5/2020-Dressing/Treatment: Other (comment) (triamcinolone opticel ag mepilex border). Per physician order, left application of multilayer compression wrap was performed in the wound care center today, to help manage edema, stasis dermatitis, and/or venous ulcers. Leave primary dressing and multi-layer wrap(s) in place until the next appointment. Also discussed ways to keep the wrap dry for a shower, including a plastic cast-guard, available in retail pharmacies. She should call before her next visit if there is any significant pain, significant strike-through of drainage to the surface of the wrap, or any significant sense of the wrap sliding down more than an inch or so, bunching-up and abrading her skin. I reminded the patient of the importance of weight management and smoking cessation, if applicable; also encouraged ambulation as tolerated, additional lower extremity exercises as instructed in our education sheet, leg elevation when at rest, and compliance with any recommended dietary, diuretic and compression therapies.     That dressing and wrap ought to last two weeks; if it does not (slipping down, etc), we gave them some supplies to redo the

## 2021-10-06 ENCOUNTER — HOSPITAL ENCOUNTER (OUTPATIENT)
Dept: WOUND CARE | Age: 71
Discharge: HOME | End: 2021-10-06
Payer: MEDICARE

## 2021-10-06 VITALS
DIASTOLIC BLOOD PRESSURE: 65 MMHG | BODY MASS INDEX: 19.99 KG/M2 | WEIGHT: 135 LBS | RESPIRATION RATE: 18 BRPM | TEMPERATURE: 97.7 F | HEART RATE: 82 BPM | HEIGHT: 69 IN | SYSTOLIC BLOOD PRESSURE: 116 MMHG

## 2021-10-06 DIAGNOSIS — L89.154 PRESSURE ULCER OF COCCYGEAL REGION, STAGE 4 (HCC): ICD-10-CM

## 2021-10-06 DIAGNOSIS — T81.31XA SURGICAL WOUND DEHISCENCE, INITIAL ENCOUNTER: Primary | ICD-10-CM

## 2021-10-06 PROCEDURE — 97597 DBRDMT OPN WND 1ST 20 CM/<: CPT | Performed by: INTERNAL MEDICINE

## 2021-10-06 PROCEDURE — 97605 NEG PRS WND THER DME<=50SQCM: CPT

## 2021-10-06 PROCEDURE — 17250 CHEM CAUT OF GRANLTJ TISSUE: CPT

## 2021-10-06 PROCEDURE — 97597 DBRDMT OPN WND 1ST 20 CM/<: CPT

## 2021-10-06 PROCEDURE — 29581 APPL MULTLAYER CMPRN SYS LEG: CPT

## 2021-10-06 RX ORDER — BACITRACIN ZINC AND POLYMYXIN B SULFATE 500; 1000 [USP'U]/G; [USP'U]/G
OINTMENT TOPICAL ONCE
Status: CANCELLED | OUTPATIENT
Start: 2021-10-06 | End: 2021-10-06

## 2021-10-06 RX ORDER — LIDOCAINE 40 MG/G
CREAM TOPICAL ONCE
Status: CANCELLED | OUTPATIENT
Start: 2021-10-06 | End: 2021-10-06

## 2021-10-06 RX ORDER — LIDOCAINE HYDROCHLORIDE 40 MG/ML
SOLUTION TOPICAL ONCE
Status: CANCELLED | OUTPATIENT
Start: 2021-10-06 | End: 2021-10-06

## 2021-10-06 RX ORDER — LIDOCAINE 40 MG/G
CREAM TOPICAL ONCE
Status: DISCONTINUED | OUTPATIENT
Start: 2021-10-06 | End: 2021-10-07 | Stop reason: HOSPADM

## 2021-10-06 RX ORDER — LIDOCAINE 50 MG/G
OINTMENT TOPICAL ONCE
Status: CANCELLED | OUTPATIENT
Start: 2021-10-06 | End: 2021-10-06

## 2021-10-06 RX ORDER — BACITRACIN ZINC AND POLYMYXIN B SULFATE 500; 1000 [USP'U]/G; [USP'U]/G
OINTMENT TOPICAL ONCE
Status: DISCONTINUED | OUTPATIENT
Start: 2021-10-06 | End: 2021-10-07 | Stop reason: HOSPADM

## 2021-10-06 ASSESSMENT — PAIN SCALES - GENERAL
PAINLEVEL_OUTOF10: 0
PAINLEVEL_OUTOF10: 0

## 2021-10-06 NOTE — PLAN OF CARE
215 [facility] Physician Orders and Discharge 800 [address], 49 [facility], [facility]  Telephone: [phone]      Fax: [phone]        Your home care Madiha Escobedo     Your wound-care supplies will be provided by: We are ordering your wound-care supplies from [employer]. Please note, depending on your insurance coverage, you may have out-of-pocket expenses for these supplies. Someone from [facility] should call you to confirm your order and discuss those potential costs before they ship your products -- please anticipate that call. If your out-of-pocket cost could be substantial, [facility] has a financial hardship program for patients who qualify, so please ask about that if you might need a hand. If you have any questions about your supplies or your potential out-of-pocket costs, or if you need to place an order for a refill of supplies (typically monthly), please call [phone].      NAME:  Gris Taveras   DATE of BIRTH:  1950  PRIMARY DIAGNOSIS FOR WOUND CARE CENTER:  Pressure ulcer     Wound cleansing:   Do not scrub or use excessive force. Wash hands with soap and water before and after dressing changes. Prior to applying a clean dressing, cleanse wound with normal saline, wound cleanser, or mild soap and water. Ask your physician or nurse before getting the wound(s) wet in the shower.                Wound care for home:        LEFT MEDIAL LOWER LEG WOUND:  Dr. Teran Si used Silver Nitrate on your wound today.  The wound will be black or silver in appearance  for the next several days, this is normal.      Vashe to wound  Procellera moistened with saline, then Hydrogel  Cover with 4x4's  Compri 2 lite compression wrap  Single layer light compression spandagrip to help hold in place  Leave in place until next visit.  Keep clean, dry & intact.          Your physician has ordered Compression for treatment of venous ulcers, venous insufficiency,and/or Lymphedema.   * Do not remove until your next scheduled visit in [facility]- do not get wet.    * If your wrap falls down, becomes painful or you have decreased sensation, and/or excessive drainage- please call the [facility] for RN visit to get your compression wrap changed   * You need to exercice as tolerated- walking is good   * Elevate your legs preferrably above level of your heart when sitting as much as possible   * The Goal of this therapy is to reduce Edema and get into long term compression garments to control venous insufficiency, lymphedema and reduce occurrence of venous ulcers        Sacral Wound:   Dr. Graeme Vigil used Silver Nitrate on your wound today.  The wound will be black or silver in appearance  for the next several days, this is normal.     Skin prep & Hydrocolloid to maegan-wound skin irritation to protect from drape   Vashe or Hypochlorous acid soaked gauze applied to wound for 2-3 minutes, no need to rinse  Flagyl crushed & sprinkled on wound bed as needed for malodor  Stoma paste or Stoma barrier ring to distal area of wound to help obtain a better seal & prevent air leak  DRAPE to maegan-wound & UNDER ANY FOAM FOR BRIDGE  Gently tuck AMD gauze into tunnel at 12 o'clock=1.7 cm  Collagen to remaining wound base (send remainder home with patient today)  Black foam-IT HAS TO BE ON THE ACTUAL WOUND & bridged to left hip (DO NOT OVER FILL WITH BLACK FOAM, SHOULD BE SLIGHTLY SMALLER THAN WOUND)  Cover with drape  Pressure -150 mmHg continuous  HHC to change dressing on Friday, Monday & Wednesday         Please note, all wounds (unless stated otherwise here) were mechanically debrided at the time of cleansing here in the wound-care center today, so a small amount of pain, drainage or bleeding from that process might be expected, and is normal.      All products for home use, including multiple products for a single wound if applicable, are medically necessary in order to achieve the best chance at timely wound healing. See provider documentation for details if needed.     Substituted dressings applied in the [facility] today, if applicable:     N/a     New orders for this week (labs, imaging, medications, etc.):     BE SURE TO GENTLY TUCK AMD GAUZE INTO TUNNELED AREA AS DIRECTED ABOVE.       Home Care to order dressing supplies through [address] [place] [address] when needed. May contact the local rep. For any questions or to order supplies  Edel Guidry # [phone]       Additional instructions for specific diagnoses:     +ROHO cushion- use at ALL TIMES when sitting!!  +True Balance Air group II mattress      General comments for pressure ulcers:  *  Make sure you stay well-hydrated, and maintain good protein intake. *  Reposition at least every two hours, to keep from having pressure in one spot for too long. *  If you are not sure whether you have the best offloading surfaces (mattress, mattress overlay, chair cushion, heel-protector boots, etc), please ask. *  Moisturize your skin regularly with Vaseline, Aquaphor, Aveeno, CeraVe, Cetaphil, Eucerin, Lubriderm, etc; but keep the skin between your toes dry. *  If you smoke, your wound can not heal properly -- please talk with us when you're ready to quit.         F/U Appointment is with Dr. Parul Craft in 1 week on                                                at                       .     Your nurse  is ABRAHAM Zambrano      If we applied slip-resistant hospital socks today, be sure to remove them at least once a day to inspect your toes or feet, even if you're not changing the wraps or dressings underneath.  If you see anything concerning (redness, excess moisture, etc), please call and let us know right away.     Should you experience any significant changes in your wound(s) (including redness, increased warmth, increased pain, increased drainage, odor, or fever) or have questions about your wound care, please contact the [facility] at [phone] Monday-Thursday from 8:00 am - 4:30 pm, or Friday from 8:00 am - 2:30 pm.  If you need help with your wound outside these hours and cannot wait until we are again available, contact your home-care company (if applicable), your PCP, or go to the nearest emergency room.

## 2021-10-07 NOTE — PROGRESS NOTES
[facility] Progress Note    Samanta Powers     : 1950    DATE OF VISIT:  10/6/2021    Subjective:     Samanta Powers is a 72 y.o. female who has a pressure ulcer located on the sacrum. Significant symptoms or pertinent wound history since last visit: out of town last week, did well overall. Continued NPWT and ROHO cushion, but was riding in a car and a plane for some time, obviously didn't have her offloading mattress with her. No F/C/D, eating well, NPWT changes going well. Additional ulcer(s) noted? yes. Left lower leg wound from SVG harvest site infection and dehiscence - her family changed that dressing and a light compression wrap once during last week    Her current medication list consists of Acapella, DULoxetine, Insulin Syringe-Needle U-100, Roflumilast, Teriparatide (Recombinant), albuterol sulfate HFA, aspirin EC, atorvastatin, blood glucose test strips, calcium carbonate, cetirizine, clopidogrel, cyclobenzaprine, docusate sodium, fluticasone, gabapentin, insulin glargine, insulin lispro, ipratropium, latanoprost, metoprolol succinate, midodrine, morphine, naloxone, omeprazole, oxyCODONE-acetaminophen, predniSONE, spironolactone, torsemide, and vitamin D. Allergies: Atenolol    Objective:     Vitals:    10/06/21 0904 10/06/21 0916   BP: 116/65    Pulse: 82    Resp: 18    Temp: 97.7 °F (36.5 °C)    TempSrc: Oral    Weight:  135 lb (61.2 kg)   Height:  5' 9\" (1.753 m)     AAOx3, fatigued, NAD  Intact distal pulses, feet warm, good cap refill  Mild-mod LE edema  No cellulitis, angitis, fluctuance  No acute arthritis or bursitis  No contact dermatitis or cutaneous Candidiasis  Blanca-ulcer skin: indurated, pink, warm and dry.   Ulcer(s): left leg smaller, red, a bit hypergranular again, mildly inflamed, clean; sacral ulcer with some good granulation over most of the visible surfaces, but still that pocket of superior undermining where the tissue had been more fibrotic - that is looking a bit more pink and red this week again though, I think not quite as deep, no definite SQ necrosis, usual layer of fibrin and biofilm, no signs of active infection, no bone exposed; wound edges are more steep overall, as well. Photos also saved in electronic chart.     Today's wound measurements, per RN documentation:  Wound 12/18/20 #2, Sacrum, Pressure Injury, Stage 4, Onset 5/2020-Wound Length (cm): 2.7 cm  Wound 07/07/21 #3, left medial leg cluster, dehisced surgical, full thickness, onset 5/2021-Wound Length (cm): 2.3 cm    Wound 12/18/20 #2, Sacrum, Pressure Injury, Stage 4, Onset 5/2020-Wound Width (cm): 0.8 cm  Wound 07/07/21 #3, left medial leg cluster, dehisced surgical, full thickness, onset 5/2021-Wound Width (cm): 0.4 cm    Wound 12/18/20 #2, Sacrum, Pressure Injury, Stage 4, Onset 5/2020-Wound Depth (cm): 0.9 cm  Wound 07/07/21 #3, left medial leg cluster, dehisced surgical, full thickness, onset 5/2021-Wound Depth (cm): 0.2 cm    Assessment:     Patient Active Problem List   Diagnosis Code    Rheumatoid arthritis ([facility] M06.9    Psoriasis L40.9    GERD (gastroesophageal reflux disease) K21.9    Anemia D64.9    Cylindrical bronchiectasis (HCC) J47.9    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    Immunocompromised state ([facility] D84.9    Coronary artery disease I25.10    Bilateral lower extremity edema R60.0    Essential hypertension I10    Lumbar spondylosis M47.816    Mitral valve insufficiency and aortic valve insufficiency I08.0    Mixed hyperlipidemia E78.2    Myopia of both eyes H52.13    Osteoporosis M81.0    Other chronic sinusitis J32.8    Primary open angle glaucoma (POAG) of both eyes, mild stage H40.1131    Primary osteoarthritis of right hip M16.11    Type 2 diabetes mellitus with unspecified diabetic retinopathy without macular edema (HCC) E11.319    Type 2 diabetes mellitus with diabetic peripheral angiopathy without gangrene, with long-term current use of insulin (HCC) E11.51, Z79.4    Pressure ulcer of coccygeal region, stage 4 (HCC) L89.154    NSTEMI (non-ST elevated myocardial infarction) ([facility] [address] I21.4    Hx of CABG Z95.1    Mild malnutrition (HCC) E44.1    Surgical wound dehiscence, initial encounter (at [facility] SVG harvest site) T81.31XA    Chronic obstructive pulmonary disease (HCC) J44.9    Hypergranulation L92.9       Assessment of today's active condition(s): Hx RA, prior back surgery, poor mobility, stage 4 pressure ulcer of sacrum, no signs of osteomyelitis, healing very slowly with current treatment, and perhaps we can make a couple of changes in NPWT to speed things along more quickly. Small left leg wound doing well, could heal in a couple of weeks; no signs of infection or ischemia. Factors contributing to occurrence and/or persistence of the chronic ulcer include edema, venous stasis, chronic pressure, decreased mobility, shear force and immunosuppression. Medical necessity of today's visit is shown by the above documentation. Sharp debridement is indicated today, based upon the exam findings in the wound(s) above. Procedure note:     Consent obtained. Time out performed per [facility]. Anesthetic  Anesthetic: 4% Lidocaine Cream     Using a curette, I sharply debrided the sacrum ulcer(s) down through and including the removal of dermis. The type(s) of tissue debrided included fibrin, biofilm and necrotic/eschar. Total Surface Area Debrided: 2 sq cm. The ulcers were then irrigated with normal saline solution. The procedure was completed with a small amount of bleeding, and hemostasis was with pressure. The patient tolerated the procedure well, with no significant complications. The patient's level of pain during and after the procedure was monitored.  Post-debridement measurements, if different from pre-debridement, are in the flowsheet as well.  ______________    To encourage better epithelial cell coverage, I did use AgNO3 to chemically cauterize hypergranulation tissue on the left  lower leg ulcer(s), after application of 4% lidocaine topical solution. This was tolerated well, with no pain or skin injury. Discharge plan:     Treatment in the wound care center today, per RN documentation: Wound 12/18/20 #2, Sacrum, Pressure Injury, Stage 4, Onset 5/2020-Dressing/Treatment: Other (comment) (Hydrocoloid-maegan,Flagyll,AMD- undermine,Purachol Ag, NPWT)  Wound 07/07/21 #3, left medial leg cluster, dehisced surgical, full thickness, onset 5/2021-Dressing/Treatment: Other (comment) (Procellera,Hydrogel, 4x4's, compri 2 lite). Per physician order, left application of multilayer compression wrap was performed in the wound care center today, to help manage edema, stasis dermatitis, and/or venous ulcers. Leave primary dressing and multi-layer wrap(s) in place until the next appointment. Also discussed ways to keep the wrap dry for a shower, including a plastic cast-guard, available in retail pharmacies. She should call before her next visit if there is any significant pain, significant strike-through of drainage to the surface of the wrap, or any significant sense of the wrap sliding down more than an inch or so, bunching-up and abrading her skin. I reminded the patient of the importance of weight management and smoking cessation, if applicable; also encouraged ambulation as tolerated, additional lower extremity exercises as instructed in our education sheet, leg elevation when at rest, and compliance with any recommended dietary, diuretic and compression therapies. For the NPWT, will increase pressure from 125 to 150 mmHg. Will also see if we can make gauze the primary dressing for the wound bed, then windowpane some drape, then just use the NPWT foam over top of that -- might help to get those edges flattened out, and the wound a bit smaller again. Home treatment: good handwashing before and after any dressing changes.  Cleanse wound with saline or soap & water before dressing change. May use Vaseline (petrolatum), Aquaphor, Aveeno, CeraVe, Cetaphil, Eucerin, Lubriderm, etc for dry skin. Dressing type for home: as above for the sacrum, three times weekly. Written discharge instructions given to patient. Follow up in 1 week.     Electronically signed by Campos Steve MD on 10/7/2021 at 12:06 PM.

## 2021-10-13 ENCOUNTER — HOSPITAL ENCOUNTER (OUTPATIENT)
Dept: WOUND CARE | Age: 71
Discharge: HOME | End: 2021-10-13
Payer: MEDICARE

## 2021-10-13 VITALS
TEMPERATURE: 97.9 F | HEIGHT: 69 IN | SYSTOLIC BLOOD PRESSURE: 140 MMHG | RESPIRATION RATE: 18 BRPM | WEIGHT: 136 LBS | DIASTOLIC BLOOD PRESSURE: 73 MMHG | BODY MASS INDEX: 20.14 KG/M2 | HEART RATE: 89 BPM

## 2021-10-13 DIAGNOSIS — T81.31XA SURGICAL WOUND DEHISCENCE, INITIAL ENCOUNTER: ICD-10-CM

## 2021-10-13 DIAGNOSIS — L89.154 PRESSURE ULCER OF COCCYGEAL REGION, STAGE 4 (HCC): ICD-10-CM

## 2021-10-13 DIAGNOSIS — T81.31XA SURGICAL WOUND DEHISCENCE, INITIAL ENCOUNTER: Primary | ICD-10-CM

## 2021-10-13 PROCEDURE — 29581 APPL MULTLAYER CMPRN SYS LEG: CPT

## 2021-10-13 PROCEDURE — 11042 DBRDMT SUBQ TIS 1ST 20SQCM/<: CPT | Performed by: INTERNAL MEDICINE

## 2021-10-13 PROCEDURE — 17250 CHEM CAUT OF GRANLTJ TISSUE: CPT

## 2021-10-13 PROCEDURE — 29581 APPL MULTLAYER CMPRN SYS LEG: CPT | Performed by: INTERNAL MEDICINE

## 2021-10-13 PROCEDURE — 97605 NEG PRS WND THER DME<=50SQCM: CPT

## 2021-10-13 PROCEDURE — 97605 NEG PRS WND THER DME<=50SQCM: CPT | Performed by: INTERNAL MEDICINE

## 2021-10-13 PROCEDURE — 11042 DBRDMT SUBQ TIS 1ST 20SQCM/<: CPT

## 2021-10-13 RX ORDER — LIDOCAINE 40 MG/G
CREAM TOPICAL ONCE
Status: CANCELLED | OUTPATIENT
Start: 2021-10-13 | End: 2021-10-13

## 2021-10-13 RX ORDER — BACITRACIN ZINC AND POLYMYXIN B SULFATE 500; 1000 [USP'U]/G; [USP'U]/G
OINTMENT TOPICAL ONCE
Status: CANCELLED | OUTPATIENT
Start: 2021-10-13 | End: 2021-10-13

## 2021-10-13 RX ORDER — BACITRACIN ZINC AND POLYMYXIN B SULFATE 500; 1000 [USP'U]/G; [USP'U]/G
OINTMENT TOPICAL ONCE
Status: DISCONTINUED | OUTPATIENT
Start: 2021-10-13 | End: 2021-10-14 | Stop reason: HOSPADM

## 2021-10-13 RX ORDER — LIDOCAINE 50 MG/G
OINTMENT TOPICAL ONCE
Status: CANCELLED | OUTPATIENT
Start: 2021-10-13 | End: 2021-10-13

## 2021-10-13 RX ORDER — LIDOCAINE 40 MG/G
CREAM TOPICAL ONCE
Status: DISCONTINUED | OUTPATIENT
Start: 2021-10-13 | End: 2021-10-14 | Stop reason: HOSPADM

## 2021-10-13 RX ORDER — LIDOCAINE HYDROCHLORIDE 40 MG/ML
SOLUTION TOPICAL ONCE
Status: CANCELLED | OUTPATIENT
Start: 2021-10-13 | End: 2021-10-13

## 2021-10-13 ASSESSMENT — PAIN SCALES - GENERAL
PAINLEVEL_OUTOF10: 0
PAINLEVEL_OUTOF10: 0

## 2021-10-13 NOTE — PLAN OF CARE
Leg wound with improvement, no debridement today, will change to using collagen then Hydrogel to wound, cont. With compression wrap for edema control. Pt. Advised to bring her compression garment to her appt. Next week. Sacral wound stable, debridement per MD & Pt. Tolerated well. Will stop black foam to wound bed, see updated wound instructions, cont. With NPWT as ordered. Dr Nia Falcon also reinforced importance of off-loading & cont. With good nutritional intake, pt. Verbalizes understanding. F/u in [facility] in 1 week as ordered, pt. Aware to call sooner with any changes or questions/concerns. Discharge instructions reviewed with patient, all questions answered, copy given to patient. Dressings were applied to all wounds per M.D. Instructions at this visit.

## 2021-10-13 NOTE — PLAN OF CARE
215 [facility] Physician Orders and Discharge 800 [facility]  [address], [address]  [place], [facility]  Telephone: [phone]      Fax: [phone] home care Madiha Escobedo  Nurse Remigio Spence [phone]     Your wound-care supplies will be provided by: We are ordering your wound-care supplies from [employer]. Please note, depending on your insurance coverage, you may have out-of-pocket expenses for these supplies. Someone from [employer] should call you to confirm your order and discuss those potential costs before they ship your products -- please anticipate that call. If your out-of-pocket cost could be substantial, [facility] has a financial hardship program for patients who qualify, so please ask about that if you might need a hand. If you have any questions about your supplies or your potential out-of-pocket costs, or if you need to place an order for a refill of supplies (typically monthly), please call [phone].      NAME:  Gris Taveras   DATE of BIRTH:  1950  PRIMARY DIAGNOSIS FOR WOUND CARE CENTER:  Pressure ulcer     Wound cleansing:   Do not scrub or use excessive force. Wash hands with soap and water before and after dressing changes. Prior to applying a clean dressing, cleanse wound with normal saline, wound cleanser, or mild soap and water. Ask your physician or nurse before getting the wound(s) wet in the shower.                Wound care for home:        LEFT MEDIAL LOWER LEG WOUND:  Vashe to wound  Puracol Ag then Hydrogel  Cover with 4x4's  Compri 2 lite compression wrap  Single layer light compression spandagrip to help hold in place  Leave in place until next visit.  Keep clean, dry & intact.          Your physician has ordered Compression for treatment of venous ulcers, venous insufficiency,and/or Lymphedema.   * Do not remove until your next scheduled visit in [facility]- do not get wet.    * If your wrap falls down, becomes painful or you have decreased sensation, and/or excessive drainage- please call the [facility] for RN visit to get your compression wrap changed   * You need to exercice as tolerated- walking is good   * Elevate your legs preferrably above level of your heart when sitting as much as possible   * The Goal of this therapy is to reduce Edema and get into long term compression garments to control venous insufficiency, lymphedema and reduce occurrence of venous ulcers        Sacral Wound:   Dr. Justine Kennedy used Silver Nitrate on your wound today.  The wound will be black or silver in appearance  for the next several days, this is normal.      Skin prep & Hydrocolloid to maegan-wound skin irritation to protect from drape   Vashe or Hypochlorous acid soaked gauze applied to wound for 2-3 minutes, no need to rinse  Flagyl crushed & sprinkled on wound bed as needed for malodor  Stoma paste or Stoma barrier ring to distal area of wound to help obtain a better seal & prevent air leak  DRAPE to maegan-wound & [address]  Collagen to wound base (send remainder home with patient today)  Gently fluff AMD gauze into tunnel at 12 o'clock=1.7 cm & wound base   Cover with drape then cut nickel or quarter size hole  Black foam over drape opening (NO FOAM TO WOUND) & then bridged to left hip   Cover with drape  Pressure -150 mmHg continuous  HHC to change dressing on Friday, Monday & Wednesday         Please note, all wounds (unless stated otherwise here) were mechanically debrided at the time of cleansing here in the wound-care center today, so a small amount of pain, drainage or bleeding from that process might be expected, and is normal.      All products for home use, including multiple products for a single wound if applicable, are medically necessary in order to achieve the best chance at timely wound healing.  See provider documentation for details if needed.     Substituted dressings applied in the [facility] today, if applicable:     N/a     New orders for this week (labs, imaging, medications, etc.):     Bring your compression stocking to your wound care appt. Next week. Change in NPWT  Application directions 37/10/42, see above.      Home Care to order dressing supplies through [address] [place] [address] when needed. May contact the local rep. For any questions or to order supplies  [employer] Eaton # [phone]       Additional instructions for specific diagnoses:     +ROHO cushion- use at ALL TIMES when sitting!!  +True Balance Air group II mattress      General comments for pressure ulcers:  *  Make sure you stay well-hydrated, and maintain good protein intake. *  Reposition at least every two hours, to keep from having pressure in one spot for too long. *  If you are not sure whether you have the best offloading surfaces (mattress, mattress overlay, chair cushion, heel-protector boots, etc), please ask. *  Moisturize your skin regularly with Vaseline, Aquaphor, Aveeno, CeraVe, Cetaphil, Eucerin, Lubriderm, etc; but keep the skin between your toes dry. *  If you smoke, your wound can not heal properly -- please talk with us when you're ready to quit.         F/U Appointment is with Dr. Shawn Murdock in 1 week on                                                at                       .     Your nurse  is ABRAHAM Zambrano      If we applied slip-resistant hospital socks today, be sure to remove them at least once a day to inspect your toes or feet, even if you're not changing the wraps or dressings underneath.  If you see anything concerning (redness, excess moisture, etc), please call and let us know right away.     Should you experience any significant changes in your wound(s) (including redness, increased warmth, increased pain, increased drainage, odor, or fever) or have questions about your wound care, please contact the [facility] at [phone] Monday-Thursday from 8:00 am - 4:30 pm, or Friday from 8:00 am - 2:30 pm.  If you need help with your wound outside these hours and cannot wait until we are again available, contact your home-care company (if applicable), your PCP, or go to the nearest emergency room

## 2021-10-15 PROBLEM — I21.4 NSTEMI (NON-ST ELEVATED MYOCARDIAL INFARCTION) (HCC): Status: RESOLVED | Noted: 2021-04-23 | Resolved: 2021-10-15

## 2021-10-15 NOTE — PROGRESS NOTES
[facility] Progress Note    Mikaela Carroll     : 1950    DATE OF VISIT:  10/13/2021    Subjective:     Mikaela Carroll is a 80 y.o. female who has a pressure ulcer located on the sacrum. Significant symptoms or pertinent wound history since last visit: feeling ok overall, mild pain, no pruritus, no fever, doing well with protein intake and offloading. NPWT a bit of a challenge, in terms of the shape of the wound, and how the skin edges are doing. Additional ulcer(s) noted? yes. Left lower leg open surgical wound, after SVG harvest dehiscence and deep infection - nearly healed. Her current medication list consists of Acapella, DULoxetine, Insulin Syringe-Needle U-100, Roflumilast, Teriparatide (Recombinant), albuterol sulfate HFA, aspirin EC, atorvastatin, blood glucose test strips, calcium carbonate, cetirizine, clopidogrel, cyclobenzaprine, docusate sodium, fluticasone, gabapentin, insulin glargine, insulin lispro, ipratropium, latanoprost, metoprolol succinate, midodrine, morphine, naloxone, omeprazole, oxyCODONE-acetaminophen, predniSONE, spironolactone, torsemide, and vitamin D. Allergies: Atenolol    Objective:     Vitals:    10/13/21 0911 10/13/21 0913   BP:  (!) 140/73   Pulse:  89   Resp: 18 18   Temp:  97.9 °F (36.6 °C)   TempSrc: Oral Oral   Weight: 136 lb (61.7 kg)    Height: 5' 9\" (1.753 m)      AAOx3, fatigued, NAD  Intact distal pulses, feet warm, good cap refill  Mild-mod LE edema  No cellulitis, angitis, fluctuance  No acute arthritis or bursitis  No contact dermatitis or cutaneous Candidiasis  Blanca-ulcer skin: indurated, pink, warm and dry.   Ulcer(s): left leg smaller, red, clean, nearly healed; sacral ulcer with some good granulation over most of the visible surfaces, but still that pocket of superior undermining where the tissue had been more fibrotic - that is looking a bit more pink and red this week again though, I think not quite as deep, small area of SQ necrosis, usual layer of fibrin and biofilm, no signs of active infection, no bone exposed; wound edges are more steep overall, as well, with some hypergranulation to one side. Photos also saved in electronic chart.     Today's wound measurements, per RN documentation:  Wound 12/18/20 #2, Sacrum, Pressure Injury, Stage 4, Onset 5/2020-Wound Length (cm): 2.5 cm  Wound 07/07/21 #3, left medial leg cluster, dehisced surgical, full thickness, onset 5/2021-Wound Length (cm): 1 cm    Wound 12/18/20 #2, Sacrum, Pressure Injury, Stage 4, Onset 5/2020-Wound Width (cm): 0.8 cm  Wound 07/07/21 #3, left medial leg cluster, dehisced surgical, full thickness, onset 5/2021-Wound Width (cm): 0.4 cm    Wound 12/18/20 #2, Sacrum, Pressure Injury, Stage 4, Onset 5/2020-Wound Depth (cm): 1 cm  Wound 07/07/21 #3, left medial leg cluster, dehisced surgical, full thickness, onset 5/2021-Wound Depth (cm): 0.1 cm    Assessment:     Patient Active Problem List   Diagnosis Code    Rheumatoid arthritis ([facility] [address] M06.9    Psoriasis L40.9    GERD (gastroesophageal reflux disease) K21.9    Anemia D64.9    Cylindrical bronchiectasis (HCC) J47.9    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    Immunocompromised state ([facility] [address] D84.9    Coronary artery disease I25.10    Bilateral lower extremity edema R60.0    Essential hypertension I10    Lumbar spondylosis M47.816    Mitral valve insufficiency and aortic valve insufficiency I08.0    Mixed hyperlipidemia E78.2    Myopia of both eyes H52.13    Osteoporosis M81.0    Other chronic sinusitis J32.8    Primary open angle glaucoma (POAG) of both eyes, mild stage H40.1131    Primary osteoarthritis of right hip M16.11    Type 2 diabetes mellitus with unspecified diabetic retinopathy without macular edema ([facility]) E11.319    Type 2 diabetes mellitus with diabetic peripheral angiopathy without gangrene, with long-term current use of insulin ([facility]) E11.51, Z79.4    Pressure ulcer of coccygeal region, stage plan:     Treatment in the wound care center today, per RN documentation: Wound 12/18/20 #2, Sacrum, Pressure Injury, Stage 4, Onset 5/2020-Dressing/Treatment: Other (comment) (yl. Purachol Ag, AMD gauze lightly fluffed,drape,NPWT)  Wound 07/07/21 #3, left medial leg cluster, dehisced surgical, full thickness, onset 5/2021-Dressing/Treatment:  (purachol, hydrogel,gauze, Accu wrap, spandigrip). Per physician order, left application of multilayer compression wrap was performed in the wound care center today, to help manage edema, stasis dermatitis, and/or venous ulcers. Leave primary dressing and multi-layer wrap(s) in place until the next appointment. Also discussed ways to keep the wrap dry for a shower, including a plastic cast-guard, available in retail pharmacies. She should call before her next visit if there is any significant pain, significant strike-through of drainage to the surface of the wrap, or any significant sense of the wrap sliding down more than an inch or so, bunching-up and abrading her skin. I reminded the patient of the importance of weight management and smoking cessation, if applicable; also encouraged ambulation as tolerated, additional lower extremity exercises as instructed in our education sheet, leg elevation when at rest, and compliance with any recommended dietary, diuretic and compression therapies. Keep up good work with offloading, glucose control, protein intake. Bring compression stockings next week, and we should be able to stop weekly LLE wraps. Home treatment: good handwashing before and after any dressing changes. Cleanse wound with saline or soap & water before dressing change. May use Vaseline (petrolatum), Aquaphor, Aveeno, CeraVe, Cetaphil, Eucerin, Lubriderm, etc for dry skin. Dressing type for home: as above for the sacrum, three times weekly. Written discharge instructions given to patient. Follow up in 1 week.     Electronically signed by Candida Peterson MD Marcio on 10/15/2021 at 8:28 AM.

## 2021-10-20 ENCOUNTER — HOSPITAL ENCOUNTER (OUTPATIENT)
Dept: WOUND CARE | Age: 71
Discharge: HOME | End: 2021-10-20
Payer: MEDICARE

## 2021-10-20 VITALS
HEIGHT: 69 IN | WEIGHT: 135.6 LBS | HEART RATE: 72 BPM | TEMPERATURE: 96.9 F | SYSTOLIC BLOOD PRESSURE: 135 MMHG | BODY MASS INDEX: 20.08 KG/M2 | RESPIRATION RATE: 18 BRPM | DIASTOLIC BLOOD PRESSURE: 71 MMHG

## 2021-10-20 DIAGNOSIS — T81.31XA SURGICAL WOUND DEHISCENCE, INITIAL ENCOUNTER: Primary | ICD-10-CM

## 2021-10-20 DIAGNOSIS — L89.154 PRESSURE ULCER OF COCCYGEAL REGION, STAGE 4 (HCC): ICD-10-CM

## 2021-10-20 PROCEDURE — 11042 DBRDMT SUBQ TIS 1ST 20SQCM/<: CPT | Performed by: INTERNAL MEDICINE

## 2021-10-20 PROCEDURE — 97605 NEG PRS WND THER DME<=50SQCM: CPT | Performed by: INTERNAL MEDICINE

## 2021-10-20 PROCEDURE — 97605 NEG PRS WND THER DME<=50SQCM: CPT

## 2021-10-20 PROCEDURE — 11042 DBRDMT SUBQ TIS 1ST 20SQCM/<: CPT

## 2021-10-20 RX ORDER — BACITRACIN ZINC AND POLYMYXIN B SULFATE 500; 1000 [USP'U]/G; [USP'U]/G
OINTMENT TOPICAL ONCE
Status: CANCELLED | OUTPATIENT
Start: 2021-10-20 | End: 2021-10-20

## 2021-10-20 RX ORDER — BACITRACIN ZINC AND POLYMYXIN B SULFATE 500; 1000 [USP'U]/G; [USP'U]/G
OINTMENT TOPICAL ONCE
Status: DISCONTINUED | OUTPATIENT
Start: 2021-10-20 | End: 2021-10-21 | Stop reason: HOSPADM

## 2021-10-20 RX ORDER — LIDOCAINE 50 MG/G
OINTMENT TOPICAL ONCE
Status: CANCELLED | OUTPATIENT
Start: 2021-10-20 | End: 2021-10-20

## 2021-10-20 RX ORDER — LIDOCAINE HYDROCHLORIDE 40 MG/ML
SOLUTION TOPICAL ONCE
Status: CANCELLED | OUTPATIENT
Start: 2021-10-20 | End: 2021-10-20

## 2021-10-20 RX ORDER — LIDOCAINE 40 MG/G
CREAM TOPICAL ONCE
Status: CANCELLED | OUTPATIENT
Start: 2021-10-20 | End: 2021-10-20

## 2021-10-20 RX ORDER — LIDOCAINE 40 MG/G
CREAM TOPICAL ONCE
Status: DISCONTINUED | OUTPATIENT
Start: 2021-10-20 | End: 2021-10-21 | Stop reason: HOSPADM

## 2021-10-20 ASSESSMENT — PAIN SCALES - GENERAL
PAINLEVEL_OUTOF10: 0
PAINLEVEL_OUTOF10: 0

## 2021-10-20 NOTE — PLAN OF CARE
[address] [facility] Physician Orders and Discharge [address]  [address], [address]  [place], [facility]  Telephone: [phone]      Fax: [phone] home care Madiha Escobedo  Nurse Yovana [phone]      Your wound-care supplies will be provided by: We are ordering your wound-care supplies from [employer]. Please note, depending on your insurance coverage, you may have out-of-pocket expenses for these supplies. Someone from [facility] should call you to confirm your order and discuss those potential costs before they ship your products -- please anticipate that call. If your out-of-pocket cost could be substantial, [facility] has a financial hardship program for patients who qualify, so please ask about that if you might need a hand. If you have any questions about your supplies or your potential out-of-pocket costs, or if you need to place an order for a refill of supplies (typically monthly), please call [phone].      NAME:  Gris Taveras   DATE of BIRTH:  1950  PRIMARY DIAGNOSIS FOR WOUND CARE CENTER:  Pressure ulcer     Wound cleansing:   Do not scrub or use excessive force. Wash hands with soap and water before and after dressing changes. Prior to applying a clean dressing, cleanse wound with normal saline, wound cleanser, or mild soap and water. Ask your physician or nurse before getting the wound(s) wet in the shower.                Wound care for home:        LEFT MEDIAL LOWER LEG WOUND:  PSO to healed area  Benzoin to maegan-wound  Cover with mepilex border  Leave in place for the week. Keep clean, dry & intact.      Start wearing your compression stockings for edema control.    Place first thing in the morning & may remove at bedtime.      Sacral Wound:   Dr. Fela Coulter used Silver Nitrate on your wound today.  The wound will be black or silver in appearance  for the next several days, this is normal.      Skin prep & Hydrocolloid to maegan-wound skin irritation to protect from drape   Vashe or Hypochlorous acid soaked gauze applied to wound for 2-3 minutes, no need to rinse  Flagyl crushed & sprinkled on wound bed as needed for malodor  Stoma paste or Stoma barrier ring to distal area of wound to help obtain a better seal & prevent air leak  DRAPE to maegan-wound & UNDER ANY FOAM FOR BRIDGE  Collagen to wound base (send remainder home with patient today)  Black foam to wound (CUT SMALLER THAN SIZE OF WOUND)  & then bridged to left hip   Cover with drape  Pressure -150 mmHg continuous  HHC to change dressing on Friday, Monday & Wednesday         Please note, all wounds (unless stated otherwise here) were mechanically debrided at the time of cleansing here in the wound-care center today, so a small amount of pain, drainage or bleeding from that process might be expected, and is normal.      All products for home use, including multiple products for a single wound if applicable, are medically necessary in order to achieve the best chance at timely wound healing. See provider documentation for details if needed.     Substituted dressings applied in the [facility] today, if applicable:     N/a     New orders for this week (labs, imaging, medications, etc.):     Change in NPWT  Application directions 96/75/25, see above.      Home Care to order dressing supplies through [employer] when needed. May contact the local rep. For any questions or to order supplies  Justine Escobar # [phone]       Additional instructions for specific diagnoses:     +ROHO cushion- use at ALL TIMES when sitting!!  +True Balance Air group II mattress      General comments for pressure ulcers:  *  Make sure you stay well-hydrated, and maintain good protein intake. *  Reposition at least every two hours, to keep from having pressure in one spot for too long.   *  If you are not sure whether you have the best offloading surfaces (mattress, mattress overlay, chair cushion, heel-protector boots, etc), please ask. *  Moisturize your skin regularly with Vaseline, Aquaphor, Aveeno, CeraVe, Cetaphil, Eucerin, Lubriderm, etc; but keep the skin between your toes dry. *  If you smoke, your wound can not heal properly -- please talk with us when you're ready to quit.         F/U Appointment is with Dr. Marien Hodgkins in 1 week on                                                at                       .     Your nurse  is ABRAHAM Zambrano      If we applied slip-resistant hospital socks today, be sure to remove them at least once a day to inspect your toes or feet, even if you're not changing the wraps or dressings underneath.  If you see anything concerning (redness, excess moisture, etc), please call and let us know right away.     Should you experience any significant changes in your wound(s) (including redness, increased warmth, increased pain, increased drainage, odor, or fever) or have questions about your wound care, please contact the [address] [place] [address] at [phone] Monday-Thursday from 8:00 am - 4:30 pm, or Friday from 8:00 am - 2:30 pm.  If you need help with your wound outside these hours and cannot wait until we are again available, contact your home-care company (if applicable), your PCP, or go to the nearest emergency room

## 2021-10-21 ENCOUNTER — OFFICE VISIT (OUTPATIENT)
Dept: CARDIOLOGY CLINIC | Age: 71
End: 2021-10-21
Payer: MEDICARE

## 2021-10-21 ENCOUNTER — TELEPHONE (OUTPATIENT)
Dept: CARDIOLOGY CLINIC | Age: 71
End: 2021-10-21

## 2021-10-21 VITALS
OXYGEN SATURATION: 98 % | HEART RATE: 70 BPM | SYSTOLIC BLOOD PRESSURE: 122 MMHG | DIASTOLIC BLOOD PRESSURE: 78 MMHG | WEIGHT: 140 LBS | BODY MASS INDEX: 20.73 KG/M2 | HEIGHT: 69 IN

## 2021-10-21 DIAGNOSIS — I42.0 DILATED CARDIOMYOPATHY (HCC): ICD-10-CM

## 2021-10-21 DIAGNOSIS — R60.0 BILATERAL LEG EDEMA: ICD-10-CM

## 2021-10-21 DIAGNOSIS — I25.10 CORONARY ARTERY DISEASE INVOLVING NATIVE CORONARY ARTERY OF NATIVE HEART WITHOUT ANGINA PECTORIS: Primary | ICD-10-CM

## 2021-10-21 DIAGNOSIS — I10 ESSENTIAL HYPERTENSION: ICD-10-CM

## 2021-10-21 DIAGNOSIS — I34.0 MITRAL VALVE INSUFFICIENCY, UNSPECIFIED ETIOLOGY: ICD-10-CM

## 2021-10-21 PROCEDURE — 99215 OFFICE O/P EST HI 40 MIN: CPT | Performed by: INTERNAL MEDICINE

## 2021-10-21 NOTE — LETTER
[address]  [facility]   Cardiovascular Evaluation    PATIENT: Loreda Fleischer  DATE: 10/21/2021  MRN: [id number]  CSN: [id number]  : 1950      Primary Care Doctor: Do Ricks MD  Reason for evaluation:   Follow-up  follow up for CAD. Subjective:   History of present illness on initial date of evaluation:   Loreda Fleischer is a 80 y.o. patient with a history of CAD with NSTEMI, anemia, HLD and DM who presents for follow up. She had right fem-pop bypass 2017. She present to the ER on 21 with fatigue and chest pain. Her echo from 21 showed her EF was 55%. Mild MR and TR. Her cardiac cath from 21 showed severe MV CAD. She underwent CABG x3 with BIMAL obliteration with VICKY Bustos on 21. On 05/10/21 she underwent mediastinal exploration and evacuation of Hematoma with DR Rocha. At her last visit she was referred to cardiac rehab. She was admitted 8/10/2021 for shortness of breath and treated for CHF. Echo showed EF 50%, possibly severe MR. [address] [place] [address] 2021 showed occluded SVG-OM, suspect severe MR due to papillary dysfunction r/t occluded RCA/LCx. A limited echocardiogram from 2021 showed an EF of 30%, moderate mitral reg. Today she states she is feeling, \"ok\". She states SOB started prior to her last cath and has decreased some since her procedure. She has occasional sharp quick chest pain at night. She states she has leg swelling if she does not wear her compression stockings. She has gained a few pounds over the last few days. She is currently wearing a wound Vac for a wound on her bottom and additional ulcer at her left leg vein harvest site- is following with wound care- Dr. Kylah You. He has jonnathan having dizziness if she reaches above her head to get something out of her kitchen cabinets. Patient currently denies any weight gain, edema, palpitations, and syncope.           Patient Active Problem List   Diagnosis    Rheumatoid arthritis ([facility]    Psoriasis  GERD (gastroesophageal reflux disease)    Anemia    Cylindrical bronchiectasis ([facility])    Tracheobronchomalacia    Immunocompromised state ([address]    Coronary artery disease    Bilateral lower extremity edema    Essential hypertension    Lumbar spondylosis    Mitral valve insufficiency and aortic valve insufficiency    Mixed hyperlipidemia    Myopia of both eyes    Osteoporosis    Other chronic sinusitis    Primary open angle glaucoma (POAG) of both eyes, mild stage    Primary osteoarthritis of right hip    Type 2 diabetes mellitus with unspecified diabetic retinopathy without macular edema ([facility])    Type 2 diabetes mellitus with diabetic peripheral angiopathy without gangrene, with long-term current use of insulin ([facility])    Pressure ulcer of coccygeal region, stage 4 ([facility])    Hx of CABG    Mild malnutrition ([address]    Surgical wound dehiscence, initial encounter (at [facility] SVG harvest site)    Chronic obstructive pulmonary disease ([facility])    Hypergranulation         Past Medical History:   has a past medical history of Acute on chronic diastolic heart failure due to coronary artery disease ([facility]), Acute respiratory failure with hypoxia ([facility]), Atherosclerosis of native artery of right lower extremity with rest pain ([address] Back pain, Branch retinal vein occlusion, Bronchiectasis with acute exacerbation ([facility]), Cellulitis and abscess of left leg, Cellulitis of left lower extremity, Closed compression fracture of thoracic vertebra ([facility]), Closed fracture of facial bone with routine healing, Closed jaw fracture ([address] Community acquired pneumonia of left lower lobe of lung, Compression fracture of L1 lumbar vertebra ([facility]), COPD (chronic obstructive pulmonary disease) ([address] Fracture of tibial plateau, closed, left, initial encounter, Minimally displaced zone I fracture of sacrum ([facility]), MRSA (methicillin resistant staph aureus) culture positive, MRSA (methicillin resistant Staphylococcus aureus), Mucus plugging of bronchi, NSTEMI (non-ST elevated myocardial infarction) ([address] Osteomyelitis of mandible, Osteoporosis with pathological fracture, Post herpetic neuralgia, Proximal humerus fracture, Rheumatoid arthritis ([address] Shingles, Sleep apnea, Status post incision and drainage, Temporal arteritis ([address] Tobacco use, Tracheomalacia, and Vitreous hemorrhage, right eye ([address] Surgical History:   has a past surgical history that includes hernia repair; Mandible fracture surgery; Foot surgery; Elbow surgery; Cataract removal; Tubal ligation; Knee arthroscopy; Kyphosis surgery; laminectomy; Spinal fusion; Septoplasty (05/07/2013); Artery surgery (05/30/2013); Upper gastrointestinal endoscopy (04/08/2014); bronchoscopy; bronchoscopy (N/A, 06/12/2019); Mandible fracture surgery (02/2020); back surgery (08/2020); other surgical history (01/08/2021); Pressure ulcer debridement (N/A, 01/08/2021); Artery Biopsy (Right, 03/01/2021); Coronary artery bypass graft (N/A, 05/03/2021); Mediastinoscopy (N/A, 05/05/2021); Cardiac surgery (05/03/2021); Leg Surgery (Left, 7/13/2021); and IR MIDLINE CATH (7/14/2021). Social History:   reports that she quit smoking about 30 years ago. Her smoking use included cigarettes. She has a 20.00 pack-year smoking history. She has never used smokeless tobacco. She reports previous alcohol use. She reports that she does not use drugs. Family History:  No evidence for sudden cardiac death or premature CAD    Home Medications:  Reviewed and are listed in nursing record.  and/or listed below  Current Outpatient Medications   Medication Sig Dispense Refill    torsemide (DEMADEX) 20 MG tablet Take 2 tablets by mouth daily If weight 131 lbs or less, decrease to 20 mg daily 60 tablet 5    metoprolol succinate (TOPROL XL) 25 MG extended release tablet Take 0.5 tablets by mouth daily 45 tablet 3    spironolactone (ALDACTONE) 25 MG tablet Take 1 tablet by mouth daily 90 tablet 3    midodrine (PROAMATINE) 2.5 MG tablet Take 1 tablet by mouth 3 times daily 270 tablet 3    DULoxetine (CYMBALTA) 60 MG extended release capsule Take 60 mg by mouth daily      DALIRESP 500 MCG tablet TAKE 1 TABLET BY MOUTH DAILY 30 tablet 11    clopidogrel (PLAVIX) 75 MG tablet Take 1 tablet by mouth daily 30 tablet 11    predniSONE (DELTASONE) 1 MG tablet Take 4 mg by mouth daily      insulin glargine (LANTUS) 100 UNIT/ML injection vial Inject 15 Units into the skin nightly 1 vial 0    oxyCODONE-acetaminophen (PERCOCET)  MG per tablet Take 1 tablet by mouth daily.  docusate sodium (COLACE) 100 MG capsule Take 100 mg by mouth 2 times daily as needed for Constipation      gabapentin (NEURONTIN) 300 MG capsule Take 300 mg by mouth 2 times daily.  latanoprost (XALATAN) 0.005 % ophthalmic solution Place 1 drop into both eyes nightly      Teriparatide, Recombinant, (FORTEO) 600 MCG/2.4ML SOPN injection Inject 20 mcg into the skin daily      NARCAN 4 MG/0.1ML LIQD nasal spray as needed       morphine (MS CONTIN) 15 MG extended release tablet 15 mg 2 times daily.        ipratropium (ATROVENT) 0.06 % nasal spray USE 2 SPRAYS BY NASAL ROUTE 2-4 TIMES DAILY 1 Bottle 5    albuterol sulfate  (90 Base) MCG/ACT inhaler INHALE 2 PUFFS INTO THE LUNGS EVERY 4 HOURS AS NEEDED FOR WHEEZING 1 Inhaler 5    vitamin D (CHOLECALCIFEROL) 1000 UNIT TABS tablet Take 5,000 Units by mouth daily       calcium carbonate (OSCAL) 500 MG TABS tablet Take 500 mg by mouth daily      atorvastatin (LIPITOR) 40 MG tablet Take 40 mg by mouth      cyclobenzaprine (FLEXERIL) 10 MG tablet Take 10 mg by mouth 2 times daily as needed       Insulin Syringe-Needle U-100 31G X 5/16\" 0.5 ML MISC USE 5 TIMES DAILY      ACCU-CHEK CEDRICK PLUS strip TEST 4 TIMES DAILY  3    aspirin EC 81 MG EC tablet Take 1 tablet by mouth daily      insulin lispro (HUMALOG) 100 UNIT/ML injection vial Inject 0-12 Units into the skin 3 times daily (with meals)      Misc. Devices (ACAPELLA) MISC Take 1 Device by mouth as needed 1 each 0    fluticasone (FLONASE) 50 MCG/ACT nasal spray INHALE 2 SPRAYS IN EACH NOSTRIL DAILY 1 Bottle 5    cetirizine (ZYRTEC) 10 MG tablet Take 10 mg by mouth daily.  omeprazole (PRILOSEC) 40 MG capsule Take 40 mg by mouth daily        No current facility-administered medications for this visit. Allergies:  Atenolol     Review of Systems:   A 14 point review of symptoms completed. Pertinent positives identified in the HPI, all other review of symptoms negative as below.     Objective:   PHYSICAL EXAM:    Vitals:    10/21/21 1050   BP: 122/78   Pulse: 70   SpO2: 98%    Weight: 140 lb (63.5 kg)     Wt Readings from Last 3 Encounters:   10/21/21 140 lb (63.5 kg)   10/20/21 135 lb 9.6 oz (61.5 kg)   10/13/21 136 lb (61.7 kg)         General Appearance:  Alert, cooperative, no distress, appears stated age   Head:  Normocephalic, atraumatic   Eyes:  PERRL, conjunctiva/corneas clear   Nose: Nares normal, no drainage or sinus tenderness   Throat: Lips, mucosa, and tongue normal   Neck: Supple, symmetrical, trachea midline, NL thyroid no carotid bruit or JVD   Lungs:   CTAB, respirations unlabored, well-healed surgical scar   Chest Wall:  No tenderness or deformity   Heart:  Regular rhythm and normal rate; S1, S2 are normal;   no murmur noted; no rub or gallop   Abdomen:   Soft, non-tender, +BS x 4, no masses, no organomegaly   Extremities: Extremities normal, atraumatic, no cyanosis 1-2+ BLE pitting R>L edema   Pulses: 2+ and symmetric   Skin: Skin color, texture, turgor normal, no rashes or lesions   Pysch: Normal mood and affect   Neurologic: Normal gross motor and sensory exam.         LABS   CBC:      Lab Results   Component Value Date    WBC 4.6 08/17/2021    RBC 3.69 08/17/2021    HGB 9.9 08/17/2021    HCT 30.7 08/17/2021    MCV 83.3 08/17/2021    RDW 17.1 08/17/2021     08/17/2021     CMP:  Lab Results   Component Value Date     2021    K 4.4 2021    K 4.1 08/10/2021    CL 93 2021    CO2 31 2021    BUN 21 2021    CREATININE 0.9 2021    GFRAA >60 2021    GFRAA >60 2013    AGRATIO 0.8 2021    LABGLOM >60 2021    GLUCOSE 147 2021    PROT 6.7 2021    PROT 7.1 2013    CALCIUM 9.1 2021    BILITOT 0.6 2021    ALKPHOS 117 2021    AST 9 2021    ALT 6 2021     PT/INR:   No results found for: PTINR  Liver:  No components found for: CHLPL  Lab Results   Component Value Date    ALT 6 (L) 2021    AST 9 (L) 2021    ALKPHOS 117 2021    BILITOT 0.6 2021     Lab Results   Component Value Date    LABA1C 7.2 2021     Lipids:         Lab Results   Component Value Date    TRIG 97 2021    TRIG 64 2021    TRIG 110 2021            Lab Results   Component Value Date    HDL 34 (L) 2021    HDL 38 (L) 2021    HDL 30 (L) 2021            Lab Results   Component Value Date    LDLCALC 54 2021    LDLCALC 35 2021    LDLCALC 29 2021            Lab Results   Component Value Date    LABVLDL 19 2021    LABVLDL 13 2021    LABVLDL 22 2021         CARDIAC DATA   EK21  SR HR 85      Limited echocardiogram 2021  Conclusions   Summary   Limited exam for mitral valve regurgitation. Moderate mitral regurgitation. Compared to exam done 21 mitral valve regurgitation has decreased. Severe left ventricular dysfunction EF < 30%       ECHO/MUGA: 21  Normal left ventricle systolic function with an estimated ejection fraction   of 55%. No regional wall motion abnormalities are seen. Normal left ventricular diastolic filling pressure. Mild eccentric aortic regurgitation. Mild mitral and tricuspid regurgitation.    Systolic pulmonary artery pressure (SPAP) is normal and estimated at 27 mmHg   (right atrial pressure 3 mmHg).      STRESS TEST:    Cath:[facility] 10[facility]  This is a right dominant coronary arterial system    Left Main coronary artery: distal 30-40% lesion  Left anterior descending coronary artery: ostial 30-40% lesion  Left circumflex coronary artery: Heavily calcified proximal   vessel with ostial 50-60% lesion, mid 50-60% lesion, OM2: normal   caliber vessel, heavily calcified with proximal 50-60% lesion,   OM3: 30-40% proximal lesion  Right coronary artery: heavily calcified vessel, minimal luminal   irregularities, RPDA: small < 2mm caliber vessel with 100%   proximal occlusion and grade II R to R collaterals from an RV   marginal.  Left ventriculogram: Normal wall motion, LVEF = 55%  LVEDP: 4 mmHg  Aortic pressure: 90/50 mmHg    Impression:   2 Vessel CAD  Normal LV function  Normal LVEDP  Normal systemic pressures     CARDIAC CATH: 21  Procedure Findings:  1. Severe multi vessel coronary artery diease              ~not amenable to PCI  2. Normal left ventricular function with EF estimated at 55-60%  3. Normal left heart hemodynamics    CAB21 DR Kirk Yin  OPERATION PERFORMED:  1. Urgent coronary bypass grafting surgery x3 with single greater  saphenous vein graft to the posterior ventricular branch of the right  coronary artery, separate single greater saphenous vein graft to the  second obtuse marginal branch of circumflex,  pedicled left internal artery to the LAD. 2.  Left atrial appendage obliteration with 40 mm AtriCure left atrial  clip. 3.  Cardiopulmonary bypass. 4.  Endoscopic vein harvest of the left greater saphenous vein. 5.  Transesophageal echo. 6.  Epiaortic ultrasound. 7.  Doppler verification of grafts. 8.  Bilateral five-level intercostal nerve block with Exparel. 9.  Platelet gel application. 10.  Sternal plating. 05/10/21 DR Rocha  OPERATION PERFORMED:  Mediastinal exploration and evacuation of  Hematoma.       Left heart cath Dr. Claire Ramirez 2021  Procedure Note     Procedure:          [facility] with grafts  Indication:          UA, wma, depressed EF, severe MR     Procedure Details:  Consent Access Bleed R Sedat Start Stop Versed Fentanyl Contrast Fluoro EBL Comp Spec   Yes RCFA int MCSFC 1448 1541 1.5 75 195 12.5 <20 None None   *Sedation Note: MCSFC = minimal conscious sedation for comfort     Findings:  Artery Findings/Result   LM Normal   LAD Mid 100% after large diagonal, distal supplied by LIMA   Cx 99% ostial, distal supplied by R-L collaterals   RI N/A   RCA 70% distal, % prox   S-O occluded   S-PLB patent   L-LAD  patent   LVEDP 8   LVG NA due to contrast      Intervention:         None     Post Cath Dx:       Severe native disease as above  Occluded S-OM  Suspect severe MR related to papillary dysfunction related to occluded distal RCA and Cx  Consider MVR when wound issues resolve if MR does not improved with medical therapy  Would followup with Dr. Black Ying        VASCULAR/OTHER IMAGING:      Assessment and Plan   Tammie Braga is a 80 y.o. female who presents today for the following problems:      1. CAD:    - 5/10/2021 3V CABG   8/2021 closure of SVG-OM  2. Mitral regurg: moderate  3. Bilateral lower extremity edema   R>L due to vein harvesting  4. Mixed ischemic/nonischemic systolic cardiomyopathy: LVEF <30%    MD Plan:  1. Was doing well following bypass surgery but unfortunately came in with heart failure and found to have a worsening EF with significant mitral regurg and cath showed interval closure of SVG to OM. Likely causing some papillary dysfunction and reason for her symptoms  2. Last echo shows moderate MR but she continues to be fairly short of breath NYHA class III  3. We will plan for ABHISHEK with anesthesia to evaluate mitral regurg severity and potential cause  4. Given CABG if needs repaired would look at mitral clip rather than repeat open heart   -Complicated by significant wound healing history  5.   Continue dual antiplatelet therapy for 1 year as tolerated, Lipitor, Lopressor. Torsemide    MDM: High    Patient Active Problem List   Diagnosis    Rheumatoid arthritis ([address]    Psoriasis    GERD (gastroesophageal reflux disease)    Anemia    Cylindrical bronchiectasis ([address]    Tracheobronchomalacia    Immunocompromised state ([address]    Coronary artery disease    Bilateral lower extremity edema    Essential hypertension    Lumbar spondylosis    Mitral valve insufficiency and aortic valve insufficiency    Mixed hyperlipidemia    Myopia of both eyes    Osteoporosis    Other chronic sinusitis    Primary open angle glaucoma (POAG) of both eyes, mild stage    Primary osteoarthritis of right hip    Type 2 diabetes mellitus with unspecified diabetic retinopathy without macular edema (HCC)    Type 2 diabetes mellitus with diabetic peripheral angiopathy without gangrene, with long-term current use of insulin (HCC)    Pressure ulcer of coccygeal region, stage 4 ([address]    Hx of CABG    Mild malnutrition ([address]    Surgical wound dehiscence, initial encounter (at [facility] SVG harvest site)    Chronic obstructive pulmonary disease ([address]    Hypergranulation       Patient Plan:  1. Recommend a transesophageal echocardiogram to evaluate mitral valve- this is done under twilight sedation in the cath lab setting. You will need a  for this procedure   2. Discussed possible need for mitral valve to be fixed if valve is severe- would refer to [facility] for mitral valve clip surgery. 3. Follow up with me after the procedure       It is a pleasure to assist in the [facility]. Please call with any questions. This note was scribed in the presence of Dr. Robyn Heck M.D. By Robyn Calderón MD, personally performed the services described in this documentation as scribed by the above signed scribe in my presence, and it is both accurate and complete to the best of our ability and knowledge.  I agree with the details independently gathered by my clinical support staff, while the remaining scribed note accurately describes my personal service to the patient. The above RN is working as a scribe for and in the presence of myself . Working as a scribe, the RN may have prepopulated components of this note with my historical intellectual property under my direct supervision. Any additions to this intellectual property were performed at my direction. Furthermore, the content and accuracy of this note have been reviewed by me to the best of my ability.          Blayne Godwin MD, [address] [facility] Cardiologist  [address]  [phone] [facility]  [phone] [facility]

## 2021-10-21 NOTE — PATIENT INSTRUCTIONS
Patient Plan:  1. Recommend a transesophageal echocardiogram to evaluate mitral valve- this is done under twilight sedation in the cath lab setting. You will need a  for this procedure   2. Discussed possible need for mitral valve to be fixed if valve is severe- would refer to [place] for mitral valve clip surgery.    3. Follow up with me after the procedure

## 2021-10-21 NOTE — PROGRESS NOTES
[address] [facility]   Cardiovascular Evaluation    PATIENT: Nicol Peralta  DATE: 10/21/2021  MRN: [id number]  CSN: [id number]  : 1950      Primary Care Doctor: Marilyn Rubio MD  Reason for evaluation:   Follow-up  follow up for CAD. Subjective:   History of present illness on initial date of evaluation:   Nicol Peralta is a 80 y.o. patient with a history of CAD with NSTEMI, anemia, HLD and DM who presents for follow up. She had right fem-pop bypass 2017. She present to the ER on 21 with fatigue and chest pain. Her echo from 21 showed her EF was 55%. Mild MR and TR. Her cardiac cath from 21 showed severe MV CAD. She underwent CABG x3 with BIMAL obliteration with VICKY Bright on 21. On 05/10/21 she underwent mediastinal exploration and evacuation of Hematoma with DR Rocha. At her last visit she was referred to cardiac rehab. She was admitted 8/10/2021 for shortness of breath and treated for CHF. Echo showed EF 50%, possibly severe MR. 5 [facility] 2021 showed occluded SVG-OM, suspect severe MR due to papillary dysfunction r/t occluded RCA/LCx. A limited echocardiogram from 2021 showed an EF of 30%, moderate mitral reg. Today she states she is feeling, \"ok\". She states SOB started prior to her last cath and has decreased some since her procedure. She has occasional sharp quick chest pain at night. She states she has leg swelling if she does not wear her compression stockings. She has gained a few pounds over the last few days. She is currently wearing a wound Vac for a wound on her bottom and additional ulcer at her left leg vein harvest site- is following with wound care- Dr. Porfirio Barber. He has jonnathan having dizziness if she reaches above her head to get something out of her kitchen cabinets. Patient currently denies any weight gain, edema, palpitations, and syncope.           Patient Active Problem List   Diagnosis    Rheumatoid arthritis ([facility]    Psoriasis    GERD (gastroesophageal reflux disease)    Anemia    Cylindrical bronchiectasis ([facility])    Tracheobronchomalacia    Immunocompromised state ([address]    Coronary artery disease    Bilateral lower extremity edema    Essential hypertension    Lumbar spondylosis    Mitral valve insufficiency and aortic valve insufficiency    Mixed hyperlipidemia    Myopia of both eyes    Osteoporosis    Other chronic sinusitis    Primary open angle glaucoma (POAG) of both eyes, mild stage    Primary osteoarthritis of right hip    Type 2 diabetes mellitus with unspecified diabetic retinopathy without macular edema ([facility])    Type 2 diabetes mellitus with diabetic peripheral angiopathy without gangrene, with long-term current use of insulin ([facility])    Pressure ulcer of coccygeal region, stage 4 ([facility])    Hx of CABG    Mild malnutrition ([address]    Surgical wound dehiscence, initial encounter (at [facility] SVG harvest site)    Chronic obstructive pulmonary disease ([facility])    Hypergranulation         Past Medical History:   has a past medical history of Acute on chronic diastolic heart failure due to coronary artery disease ([facility]), Acute respiratory failure with hypoxia ([facility]), Atherosclerosis of native artery of right lower extremity with rest pain ([address] Back pain, Branch retinal vein occlusion, Bronchiectasis with acute exacerbation ([facility]), Cellulitis and abscess of left leg, Cellulitis of left lower extremity, Closed compression fracture of thoracic vertebra ([facility]), Closed fracture of facial bone with routine healing, Closed jaw fracture ([address] Community acquired pneumonia of left lower lobe of lung, Compression fracture of L1 lumbar vertebra ([facility]), COPD (chronic obstructive pulmonary disease) ([address] Fracture of tibial plateau, closed, left, initial encounter, Minimally displaced zone I fracture of sacrum ([facility]), MRSA (methicillin resistant staph aureus) culture positive, MRSA (methicillin resistant Staphylococcus aureus), Mucus plugging of bronchi, NSTEMI (non-ST elevated myocardial infarction) ([address] Osteomyelitis of mandible, Osteoporosis with pathological fracture, Post herpetic neuralgia, Proximal humerus fracture, Rheumatoid arthritis ([address] Shingles, Sleep apnea, Status post incision and drainage, Temporal arteritis ([address] Tobacco use, Tracheomalacia, and Vitreous hemorrhage, right eye ([address] Surgical History:   has a past surgical history that includes hernia repair; Mandible fracture surgery; Foot surgery; Elbow surgery; Cataract removal; Tubal ligation; Knee arthroscopy; Kyphosis surgery; laminectomy; Spinal fusion; Septoplasty (05/07/2013); Artery surgery (05/30/2013); Upper gastrointestinal endoscopy (04/08/2014); bronchoscopy; bronchoscopy (N/A, 06/12/2019); Mandible fracture surgery (02/2020); back surgery (08/2020); other surgical history (01/08/2021); Pressure ulcer debridement (N/A, 01/08/2021); Artery Biopsy (Right, 03/01/2021); Coronary artery bypass graft (N/A, 05/03/2021); Mediastinoscopy (N/A, 05/05/2021); Cardiac surgery (05/03/2021); Leg Surgery (Left, 7/13/2021); and IR MIDLINE CATH (7/14/2021). Social History:   reports that she quit smoking about 30 years ago. Her smoking use included cigarettes. She has a 20.00 pack-year smoking history. She has never used smokeless tobacco. She reports previous alcohol use. She reports that she does not use drugs. Family History:  No evidence for sudden cardiac death or premature CAD    Home Medications:  Reviewed and are listed in nursing record.  and/or listed below  Current Outpatient Medications   Medication Sig Dispense Refill    torsemide (DEMADEX) 20 MG tablet Take 2 tablets by mouth daily If weight 131 lbs or less, decrease to 20 mg daily 60 tablet 5    metoprolol succinate (TOPROL XL) 25 MG extended release tablet Take 0.5 tablets by mouth daily 45 tablet 3    spironolactone (ALDACTONE) 25 MG tablet Take 1 tablet by mouth daily 90 tablet 3    midodrine (PROAMATINE) 2.5 MG tablet Take 1 tablet by mouth 3 times daily 270 tablet 3    DULoxetine (CYMBALTA) 60 MG extended release capsule Take 60 mg by mouth daily      DALIRESP 500 MCG tablet TAKE 1 TABLET BY MOUTH DAILY 30 tablet 11    clopidogrel (PLAVIX) 75 MG tablet Take 1 tablet by mouth daily 30 tablet 11    predniSONE (DELTASONE) 1 MG tablet Take 4 mg by mouth daily      insulin glargine (LANTUS) 100 UNIT/ML injection vial Inject 15 Units into the skin nightly 1 vial 0    oxyCODONE-acetaminophen (PERCOCET)  MG per tablet Take 1 tablet by mouth daily.  docusate sodium (COLACE) 100 MG capsule Take 100 mg by mouth 2 times daily as needed for Constipation      gabapentin (NEURONTIN) 300 MG capsule Take 300 mg by mouth 2 times daily.  latanoprost (XALATAN) 0.005 % ophthalmic solution Place 1 drop into both eyes nightly      Teriparatide, Recombinant, (FORTEO) 600 MCG/2.4ML SOPN injection Inject 20 mcg into the skin daily      NARCAN 4 MG/0.1ML LIQD nasal spray as needed       morphine (MS CONTIN) 15 MG extended release tablet 15 mg 2 times daily.        ipratropium (ATROVENT) 0.06 % nasal spray USE 2 SPRAYS BY NASAL ROUTE 2-4 TIMES DAILY 1 Bottle 5    albuterol sulfate  (90 Base) MCG/ACT inhaler INHALE 2 PUFFS INTO THE LUNGS EVERY 4 HOURS AS NEEDED FOR WHEEZING 1 Inhaler 5    vitamin D (CHOLECALCIFEROL) 1000 UNIT TABS tablet Take 5,000 Units by mouth daily       calcium carbonate (OSCAL) 500 MG TABS tablet Take 500 mg by mouth daily      atorvastatin (LIPITOR) 40 MG tablet Take 40 mg by mouth      cyclobenzaprine (FLEXERIL) 10 MG tablet Take 10 mg by mouth 2 times daily as needed       Insulin Syringe-Needle U-100 31G X 5/16\" 0.5 ML MISC USE 5 TIMES DAILY      ACCU-CHEK CEDRICK PLUS strip TEST 4 TIMES DAILY  3    aspirin EC 81 MG EC tablet Take 1 tablet by mouth daily      insulin lispro (HUMALOG) 100 UNIT/ML injection vial Inject 0-12 Units into the skin 3 times daily (with meals)      Misc. Devices (ACAPELLA) MISC Take 1 Device by mouth as needed 1 each 0    fluticasone (FLONASE) 50 MCG/ACT nasal spray INHALE 2 SPRAYS IN EACH NOSTRIL DAILY 1 Bottle 5    cetirizine (ZYRTEC) 10 MG tablet Take 10 mg by mouth daily.  omeprazole (PRILOSEC) 40 MG capsule Take 40 mg by mouth daily        No current facility-administered medications for this visit. Allergies:  Atenolol     Review of Systems:   A 14 point review of symptoms completed. Pertinent positives identified in the HPI, all other review of symptoms negative as below.     Objective:   PHYSICAL EXAM:    Vitals:    10/21/21 1050   BP: 122/78   Pulse: 70   SpO2: 98%    Weight: 140 lb (63.5 kg)     Wt Readings from Last 3 Encounters:   10/21/21 140 lb (63.5 kg)   10/20/21 135 lb 9.6 oz (61.5 kg)   10/13/21 136 lb (61.7 kg)         General Appearance:  Alert, cooperative, no distress, appears stated age   Head:  Normocephalic, atraumatic   Eyes:  PERRL, conjunctiva/corneas clear   Nose: Nares normal, no drainage or sinus tenderness   Throat: Lips, mucosa, and tongue normal   Neck: Supple, symmetrical, trachea midline, NL thyroid no carotid bruit or JVD   Lungs:   CTAB, respirations unlabored, well-healed surgical scar   Chest Wall:  No tenderness or deformity   Heart:  Regular rhythm and normal rate; S1, S2 are normal;   no murmur noted; no rub or gallop   Abdomen:   Soft, non-tender, +BS x 4, no masses, no organomegaly   Extremities: Extremities normal, atraumatic, no cyanosis 1-2+ BLE pitting R>L edema   Pulses: 2+ and symmetric   Skin: Skin color, texture, turgor normal, no rashes or lesions   Pysch: Normal mood and affect   Neurologic: Normal gross motor and sensory exam.         LABS   CBC:      Lab Results   Component Value Date    WBC 4.6 08/17/2021    RBC 3.69 08/17/2021    HGB 9.9 08/17/2021    HCT 30.7 08/17/2021    MCV 83.3 08/17/2021    RDW 17.1 08/17/2021     08/17/2021     CMP:  Lab Results   Component Value Date    NA 135 2021    K 4.4 2021    K 4.1 08/10/2021    CL 93 2021    CO2 31 2021    BUN 21 2021    CREATININE 0.9 2021    GFRAA >60 2021    GFRAA >60 2013    AGRATIO 0.8 2021    LABGLOM >60 2021    GLUCOSE 147 2021    PROT 6.7 2021    PROT 7.1 2013    CALCIUM 9.1 2021    BILITOT 0.6 2021    ALKPHOS 117 2021    AST 9 2021    ALT 6 2021     PT/INR:   No results found for: PTINR  Liver:  No components found for: CHLPL  Lab Results   Component Value Date    ALT 6 (L) 2021    AST 9 (L) 2021    ALKPHOS 117 2021    BILITOT 0.6 2021     Lab Results   Component Value Date    LABA1C 7.2 2021     Lipids:         Lab Results   Component Value Date    TRIG 97 2021    TRIG 64 2021    TRIG 110 2021            Lab Results   Component Value Date    HDL 34 (L) 2021    HDL 38 (L) 2021    HDL 30 (L) 2021            Lab Results   Component Value Date    LDLCALC 54 2021    LDLCALC 35 2021    LDLCALC 29 2021            Lab Results   Component Value Date    LABVLDL 19 2021    LABVLDL 13 2021    LABVLDL 22 2021         CARDIAC DATA   EK21  SR HR 85      Limited echocardiogram 2021  Conclusions   Summary   Limited exam for mitral valve regurgitation. Moderate mitral regurgitation. Compared to exam done 21 mitral valve regurgitation has decreased. Severe left ventricular dysfunction EF < 30%       ECHO/MUGA: 21  Normal left ventricle systolic function with an estimated ejection fraction   of 55%. No regional wall motion abnormalities are seen. Normal left ventricular diastolic filling pressure. Mild eccentric aortic regurgitation. Mild mitral and tricuspid regurgitation. Systolic pulmonary artery pressure (SPAP) is normal and estimated at 27 mmHg   (right atrial pressure 3 mmHg).       STRESS TEST:    Cath:[facility] 10/7/2016  This is a right dominant coronary arterial system    Left Main coronary artery: distal 30-40% lesion  Left anterior descending coronary artery: ostial 30-40% lesion  Left circumflex coronary artery: Heavily calcified proximal   vessel with ostial 50-60% lesion, mid 50-60% lesion, OM2: normal   caliber vessel, heavily calcified with proximal 50-60% lesion,   OM3: 30-40% proximal lesion  Right coronary artery: heavily calcified vessel, minimal luminal   irregularities, RPDA: small < 2mm caliber vessel with 100%   proximal occlusion and grade II R to R collaterals from an RV   marginal.  Left ventriculogram: Normal wall motion, LVEF = 55%  LVEDP: 4 mmHg  Aortic pressure: 90/50 mmHg    Impression:   2 Vessel CAD  Normal LV function  Normal LVEDP  Normal systemic pressures     CARDIAC CATH: 21  Procedure Findings:  1. Severe multi vessel coronary artery diease              ~not amenable to PCI  2. Normal left ventricular function with EF estimated at 55-60%  3. Normal left heart hemodynamics    CAB21 DR Yelitza Blevins  OPERATION PERFORMED:  1. Urgent coronary bypass grafting surgery x3 with single greater  saphenous vein graft to the posterior ventricular branch of the right  coronary artery, separate single greater saphenous vein graft to the  second obtuse marginal branch of circumflex,  pedicled left internal artery to the LAD. 2.  Left atrial appendage obliteration with 40 mm AtriCure left atrial  clip. 3.  Cardiopulmonary bypass. 4.  Endoscopic vein harvest of the left greater saphenous vein. 5.  Transesophageal echo. 6.  Epiaortic ultrasound. 7.  Doppler verification of grafts. 8.  Bilateral five-level intercostal nerve block with Exparel. 9.  Platelet gel application. 10.  Sternal plating. 05/10/21 DR Rocha  OPERATION PERFORMED:  Mediastinal exploration and evacuation of  Hematoma.       Left heart cath Dr. Shira Becker 2021  Procedure Note     Procedure: [facility] with grafts  Indication:          UA, wma, depressed EF, severe MR     Procedure Details:  Consent Access Bleed R Sedat Start Stop Versed Fentanyl Contrast Fluoro EBL Comp Spec   Yes RCFA int MCS 1448 1541 1.5 75 195 12.5 <20 None None   *Sedation Note: MCSFC = minimal conscious sedation for comfort     Findings:  Artery Findings/Result   LM Normal   LAD Mid 100% after large diagonal, distal supplied by LIMA   Cx 99% ostial, distal supplied by R-L collaterals   RI N/A   RCA 70% distal, % prox   S-O occluded   S-PLB patent   L-LAD  patent   LVEDP 8   LVG NA due to contrast      Intervention:         None     Post Cath Dx:       Severe native disease as above  Occluded S-OM  Suspect severe MR related to papillary dysfunction related to occluded distal RCA and Cx  Consider MVR when wound issues resolve if MR does not improved with medical therapy  Would followup with Dr. Black Ying        VASCULAR/OTHER IMAGING:      Assessment and Plan   Tammie Braga is a 80 y.o. female who presents today for the following problems:      1. CAD:    - 5/10/2021 3V CABG   8/2021 closure of SVG-OM  2. Mitral regurg: moderate  3. Bilateral lower extremity edema   R>L due to vein harvesting  4. Mixed ischemic/nonischemic systolic cardiomyopathy: LVEF <30%    MD Plan:  1. Was doing well following bypass surgery but unfortunately came in with heart failure and found to have a worsening EF with significant mitral regurg and cath showed interval closure of SVG to OM. Likely causing some papillary dysfunction and reason for her symptoms  2. Last echo shows moderate MR but she continues to be fairly short of breath NYHA class III  3. We will plan for ABHISHEK with anesthesia to evaluate mitral regurg severity and potential cause  4. Given CABG if needs repaired would look at mitral clip rather than repeat open heart   -Complicated by significant wound healing history  5.   Continue dual antiplatelet therapy for 1 year as tolerated, Lipitor, Lopressor. Torsemide    MDM: High    Patient Active Problem List   Diagnosis    Rheumatoid arthritis ([address]    Psoriasis    GERD (gastroesophageal reflux disease)    Anemia    Cylindrical bronchiectasis ([address]    Tracheobronchomalacia    Immunocompromised state ([address]    Coronary artery disease    Bilateral lower extremity edema    Essential hypertension    Lumbar spondylosis    Mitral valve insufficiency and aortic valve insufficiency    Mixed hyperlipidemia    Myopia of both eyes    Osteoporosis    Other chronic sinusitis    Primary open angle glaucoma (POAG) of both eyes, mild stage    Primary osteoarthritis of right hip    Type 2 diabetes mellitus with unspecified diabetic retinopathy without macular edema (HCC)    Type 2 diabetes mellitus with diabetic peripheral angiopathy without gangrene, with long-term current use of insulin (HCC)    Pressure ulcer of coccygeal region, stage 4 ([address]    Hx of CABG    Mild malnutrition ([address]    Surgical wound dehiscence, initial encounter (at [facility] SVG harvest site)    Chronic obstructive pulmonary disease ([address]    Hypergranulation       Patient Plan:  1. Recommend a transesophageal echocardiogram to evaluate mitral valve- this is done under twilight sedation in the cath lab setting. You will need a  for this procedure   2. Discussed possible need for mitral valve to be fixed if valve is severe- would refer to [facility] for mitral valve clip surgery. 3. Follow up with me after the procedure       It is a pleasure to assist in the care of Demario Kitchen. Please call with any questions. This note was scribed in the presence of Dr. Ok Ruiz M.D. By Ok Hutson MD, personally performed the services described in this documentation as scribed by the above signed scribe in my presence, and it is both accurate and complete to the best of our ability and knowledge.  I agree with the details independently gathered by my clinical support staff, while the remaining scribed note accurately describes my personal service to the patient. The above RN is working as a scribe for and in the presence of myself . Working as a scribe, the RN may have prepopulated components of this note with my historical intellectual property under my direct supervision. Any additions to this intellectual property were performed at my direction. Furthermore, the content and accuracy of this note have been reviewed by me to the best of my ability.          Camilo Hanks MD, [address] [facility] Cardiologist  [facility]  [phone] [facility]  [phone] [facility]

## 2021-10-22 NOTE — TELEPHONE ENCOUNTER
Spoke with patient. Patient is scheduled with Dr. Therisa Hatchet for ABHISHEK with anesthesia on 10/29/21 at [facility], arrival time of 7:30am to the Cath Lab. Please have patient arrive to the main entrance of [facility] and check in with the registration desk. Please call patient regarding medication instructions. Remind patient to be NPO after midnight (8 hours prior). Do not apply lotions/creams on skin the day of procedure.     COVID testing -  Pre-Procedure Process   We are asking the physicians' offices to encourage all patients to obtain a COVID test prior to their procedure (within 6 days of their scheduled date)   The PAT RN will encourage the patient to obtain a COVID test prior to their procedure (within 6 days of their scheduled date)   This can be done at their Primary Care Doctor, [address] [place] [address], Urgent Care, [place], [facility], etc.   A PCR or Rapid test result will be accepted        [facility]/Walk in ([address] [facility] main entrance of hospital)  Mondays - 0800 to 1700  Wednesdays - 0800 to 1700  Fridays - 0800 to 1700

## 2021-10-25 NOTE — TELEPHONE ENCOUNTER
HOLD Demadex, Spironolactone, insulin, and only take half evening dose the night before. NPO after midnight. No lotions/creams.   Patient informed and VU

## 2021-10-25 NOTE — PROGRESS NOTES
[address] [facility] Progress Note    Marion Lamas     : 1950    DATE OF VISIT:  10/20/2021    Subjective:     Marion Lamas is a 80 y.o. female who has a pressure ulcer located on the sacrum. Significant symptoms or pertinent wound history since last visit: feeling ok overall, little wound pain, no pruritus, no fever. Energy level decent, appetite decent, doing well with offloading. The change in NPWT technique really didn't work this week -- wound bed looks about the same, maybe less wound-edge irritation but more undermining, and drainage didn't flow through the gauze as well as it has her foam, so the periwound became macerated this week. Additional ulcer(s) noted? no. Left lower leg wound looks delicately healed this week. Her current medication list consists of Acapella, DULoxetine, Insulin Syringe-Needle U-100, Roflumilast, Teriparatide (Recombinant), albuterol sulfate HFA, aspirin EC, atorvastatin, blood glucose test strips, calcium carbonate, cetirizine, clopidogrel, cyclobenzaprine, docusate sodium, fluticasone, gabapentin, insulin glargine, insulin lispro, ipratropium, latanoprost, metoprolol succinate, midodrine, morphine, naloxone, omeprazole, oxyCODONE-acetaminophen, predniSONE, spironolactone, torsemide, and vitamin D. Allergies: Atenolol    Objective:     Vitals:    10/20/21 0914   BP: 135/71   Pulse: 72   Resp: 18   Temp: 96.9 °F (36.1 °C)   TempSrc: Oral   Weight: 135 lb 9.6 oz (61.5 kg)   Height: 5' 9\" (1.753 m)     AAOx3, fatigued, NAD  Intact distal pulses, feet warm, good cap refill  Mild LE edema  No cellulitis, angitis, fluctuance  No acute arthritis or bursitis  No contact dermatitis or cutaneous Candidiasis  Blanca-ulcer skin: indurated, pink, warm and dry.   Ulcer(s): left leg dry and delicately healed; sacral ulcer with some good granulation over most of the visible surfaces, but still that pocket of superior undermining, I think not quite as deep, small area of central SQ fibronecrosis, usual layer of fibrin and biofilm, no signs of active infection, no bone exposed; wound edges are still a bit steep overall, as well, with resolved hypergranulation to one side, but more noticeable areas of epibole and undermining now. Photos also saved in electronic chart.     Today's wound measurements, per RN documentation:  [REMOVED] Wound 07/07/21 #3, left medial leg cluster, dehisced surgical, full thickness, onset 5/2021-Wound Length (cm): 0 cm  Wound 12/18/20 #2, Sacrum, Pressure Injury, Stage 4, Onset 5/2020-Wound Length (cm): 2.5 cm    [REMOVED] Wound 07/07/21 #3, left medial leg cluster, dehisced surgical, full thickness, onset 5/2021-Wound Width (cm): 0 cm  Wound 12/18/20 #2, Sacrum, Pressure Injury, Stage 4, Onset 5/2020-Wound Width (cm): 0.8 cm    [REMOVED] Wound 07/07/21 #3, left medial leg cluster, dehisced surgical, full thickness, onset 5/2021-Wound Depth (cm): 0 cm  Wound 12/18/20 #2, Sacrum, Pressure Injury, Stage 4, Onset 5/2020-Wound Depth (cm): 1 cm    Assessment:     Patient Active Problem List   Diagnosis Code    Rheumatoid arthritis ([facility] [address] M06.9    Psoriasis L40.9    GERD (gastroesophageal reflux disease) K21.9    Anemia D64.9    Cylindrical bronchiectasis ([facility] [address] J47.9    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    Immunocompromised state ([facility] [address] D84.9    Coronary artery disease I25.10    Bilateral lower extremity edema R60.0    Essential hypertension I10    Lumbar spondylosis M47.816    Mitral valve insufficiency and aortic valve insufficiency I08.0    Mixed hyperlipidemia E78.2    Myopia of both eyes H52.13    Osteoporosis M81.0    Other chronic sinusitis J32.8    Primary open angle glaucoma (POAG) of both eyes, mild stage H40.1131    Primary osteoarthritis of right hip M16.11    Type 2 diabetes mellitus with unspecified diabetic retinopathy without macular edema (HCC) E11.319    Type 2 diabetes mellitus with diabetic peripheral angiopathy without gangrene, with long-term current use of insulin ([facility]) E11.51, Z79.4    Pressure ulcer of coccygeal region, stage 4 ([facility]) L89.154    Hx of CABG Z95.1    Mild malnutrition ([facility]) E44.1    Surgical wound dehiscence, initial encounter (at [facility] SVG harvest site) T81.31XA    Chronic obstructive pulmonary disease (HCC) J44.9    Hypergranulation L92.9       Assessment of today's active condition(s): RA, Hx back surgery, decreased mobility, slowly healing stage 4 sacral pressure ulcer, generally well offloaded, no recent signs of infection, less necrosis. Also an unrelated cardiac event a couple of months ago, CABG with a SVG harvest, leg wound dehiscence, necrosis and infection, taken back to the OR for a wider debridement, completed ABx, and that would delicately healed after the above, plus NPWT and compression, more recently simpler dressings. Factors contributing to occurrence and/or persistence of the chronic ulcer include edema, venous stasis, chronic pressure, decreased mobility, shear force and immunosuppression. Medical necessity of today's visit is shown by the above documentation. Sharp debridement is indicated today, based upon the exam findings in the wound(s) above. Procedure note:     Consent obtained. Time out performed per [address] policy. Anesthetic  Anesthetic: 2% Lidocaine Injectable with Epinephrine (3 cc's injected per Dr Paul Dash) -- into the left side and superior corner of the wound, after 4% lidocaine cream to the entire wound bed. Using a curette, #15 blade scalpel and forceps, I sharply debrided (and excised a portion of the edge of) the sacrum ulcer(s) down through and including the removal of subcutaneous tissue. The type(s) of tissue debrided included fibrin, biofilm, necrotic/eschar and two areas of epibole. Total Surface Area Debrided: 3 sq cm. Those two areas of edge that I excised do look better now, more saucerized, so I plan to do that right sided edge next week.     The ulcers were then irrigated with normal saline solution. The procedure was completed with a small amount of bleeding, and hemostasis was with pressure and with silver nitrate stick(s). The patient tolerated the procedure well, with no significant complications. The patient's level of pain during and after the procedure was monitored. Post-debridement measurements, if different from pre-debridement, are in the flowsheet as well. Discharge plan:     Treatment in the wound care center today, per RN documentation: [REMOVED] Wound 07/07/21 #3, left medial leg cluster, dehisced surgical, full thickness, onset 5/2021-Dressing/Treatment: Other (comment) (Benzoin-maegan,PSO,Small mepilex border)  Wound 12/18/20 #2, Sacrum, Pressure Injury, Stage 4, Onset 5/2020-Dressing/Treatment: Other (comment) (Flagyl, Purachol Ag, NPWT). Can keep that left leg dressing in place for the week. Transition to daily compression stockings now. Keep up with offloading (ROHO cushion when seated, group 2 mattress when in bed, good transfer technique), protein intake. Home treatment: good handwashing before and after any dressing changes. Cleanse wound with saline or soap & water before dressing change. May use Vaseline (petrolatum), Aquaphor, Aveeno, CeraVe, Cetaphil, Eucerin, Lubriderm, etc for dry skin. Dressing type for home: Hypochlorous acid spray, Kathia and NPWT (back to just black foam, bridge toward hip, standard drape), three times weekly. Written discharge instructions given to patient. Follow up in 1 week.     Electronically signed by Christine Marsh MD on 10/25/2021 at 3:30 PM.

## 2021-10-27 ENCOUNTER — HOSPITAL ENCOUNTER (OUTPATIENT)
Dept: WOUND CARE | Age: 71
Discharge: HOME | End: 2021-10-27
Payer: MEDICARE

## 2021-10-27 ENCOUNTER — HOSPITAL ENCOUNTER (OUTPATIENT)
Age: 71
Discharge: HOME | End: 2021-10-27
Payer: MEDICARE

## 2021-10-27 VITALS
WEIGHT: 136 LBS | BODY MASS INDEX: 20.14 KG/M2 | HEIGHT: 69 IN | HEART RATE: 81 BPM | SYSTOLIC BLOOD PRESSURE: 110 MMHG | DIASTOLIC BLOOD PRESSURE: 56 MMHG | TEMPERATURE: 98.6 F | RESPIRATION RATE: 16 BRPM

## 2021-10-27 DIAGNOSIS — L89.154 PRESSURE ULCER OF COCCYGEAL REGION, STAGE 4 (HCC): ICD-10-CM

## 2021-10-27 DIAGNOSIS — T81.31XA SURGICAL WOUND DEHISCENCE, INITIAL ENCOUNTER: Primary | ICD-10-CM

## 2021-10-27 PROCEDURE — U0005 INFEC AGEN DETEC AMPLI PROBE: HCPCS

## 2021-10-27 PROCEDURE — 11043 DBRDMT MUSC&/FSCA 1ST 20/<: CPT

## 2021-10-27 PROCEDURE — 11043 DBRDMT MUSC&/FSCA 1ST 20/<: CPT | Performed by: INTERNAL MEDICINE

## 2021-10-27 PROCEDURE — U0003 INFECTIOUS AGENT DETECTION BY NUCLEIC ACID (DNA OR RNA); SEVERE ACUTE RESPIRATORY SYNDROME CORONAVIRUS 2 (SARS-COV-2) (CORONAVIRUS DISEASE [COVID-19]), AMPLIFIED PROBE TECHNIQUE, MAKING USE OF HIGH THROUGHPUT TECHNOLOGIES AS DESCRIBED BY CMS-2020-01-R: HCPCS

## 2021-10-27 RX ORDER — LIDOCAINE HYDROCHLORIDE 40 MG/ML
SOLUTION TOPICAL ONCE
Status: CANCELLED | OUTPATIENT
Start: 2021-10-27 | End: 2021-10-27

## 2021-10-27 RX ORDER — LIDOCAINE 40 MG/G
CREAM TOPICAL ONCE
Status: CANCELLED | OUTPATIENT
Start: 2021-10-27 | End: 2021-10-27

## 2021-10-27 RX ORDER — BACITRACIN ZINC AND POLYMYXIN B SULFATE 500; 1000 [USP'U]/G; [USP'U]/G
OINTMENT TOPICAL ONCE
Status: CANCELLED | OUTPATIENT
Start: 2021-10-27 | End: 2021-10-27

## 2021-10-27 RX ORDER — LIDOCAINE 40 MG/G
CREAM TOPICAL ONCE
Status: DISCONTINUED | OUTPATIENT
Start: 2021-10-27 | End: 2021-10-28 | Stop reason: HOSPADM

## 2021-10-27 RX ORDER — BACITRACIN ZINC AND POLYMYXIN B SULFATE 500; 1000 [USP'U]/G; [USP'U]/G
OINTMENT TOPICAL ONCE
Status: DISCONTINUED | OUTPATIENT
Start: 2021-10-27 | End: 2021-10-28 | Stop reason: HOSPADM

## 2021-10-27 RX ORDER — LIDOCAINE 50 MG/G
OINTMENT TOPICAL ONCE
Status: CANCELLED | OUTPATIENT
Start: 2021-10-27 | End: 2021-10-27

## 2021-10-27 ASSESSMENT — PAIN DESCRIPTION - ONSET: ONSET: ON-GOING

## 2021-10-27 ASSESSMENT — PAIN - FUNCTIONAL ASSESSMENT: PAIN_FUNCTIONAL_ASSESSMENT: PREVENTS OR INTERFERES SOME ACTIVE ACTIVITIES AND ADLS

## 2021-10-27 ASSESSMENT — PAIN DESCRIPTION - PAIN TYPE: TYPE: CHRONIC PAIN

## 2021-10-27 ASSESSMENT — PAIN DESCRIPTION - LOCATION: LOCATION: COCCYX

## 2021-10-27 ASSESSMENT — PAIN SCALES - GENERAL
PAINLEVEL_OUTOF10: 4
PAINLEVEL_OUTOF10: 0

## 2021-10-27 ASSESSMENT — PAIN DESCRIPTION - FREQUENCY: FREQUENCY: INTERMITTENT

## 2021-10-27 ASSESSMENT — PAIN DESCRIPTION - PROGRESSION: CLINICAL_PROGRESSION: NOT CHANGED

## 2021-10-27 ASSESSMENT — PAIN DESCRIPTION - DESCRIPTORS: DESCRIPTORS: BURNING

## 2021-10-27 NOTE — PLAN OF CARE
[address] [facility] Physician Orders and Discharge [address], [address]  [place], [facility]  Telephone: [phone]      Fax: [phone] home care company:   [facility] Ponce [phone]      Your wound-care supplies will be provided by: We are ordering your wound-care supplies from [employer]. Please note, depending on your insurance coverage, you may have out-of-pocket expenses for these supplies. Someone from [employer] should call you to confirm your order and discuss those potential costs before they ship your products -- please anticipate that call. If your out-of-pocket cost could be substantial, [facility] has a financial hardship program for patients who qualify, so please ask about that if you might need a hand. If you have any questions about your supplies or your potential out-of-pocket costs, or if you need to place an order for a refill of supplies (typically monthly), please call [phone]. NAME:  Alana Taveras   YOB: 1950  PRIMARY DIAGNOSIS FOR WOUND CARE CENTER:  Pressure ulcer     Wound cleansing:   Do not scrub or use excessive force. Wash hands with soap and water before and after dressing changes. Prior to applying a clean dressing, cleanse wound with normal saline, wound cleanser, or mild soap and water. Ask your physician or nurse before getting the wound(s) wet in the shower. Wound care for home:        LEFT MEDIAL LOWER LEG WOUND:  Procellera moistened with saline then Hydrogel  Benzoin to maegan-wound  Cover with mepilex border  Leave in place for the week. Keep clean, dry & intact. Start wearing your compression stockings for edema control. Place first thing in the morning & may remove at bedtime.       Sacral Wound:   Vashe or Hypochlorous acid soaked gauze applied to wound for 2-3 minutes, no need to rinse  Triad small amount to wound & maegan-wound  Silver Alginate over Triad  Cover with dry dressing  [facility] to change dressing on Friday, Monday         Please note, all wounds (unless stated otherwise here) were mechanically debrided at the time of cleansing here in the wound-care center today, so a small amount of pain, drainage or bleeding from that process might be expected, and is normal.      All products for home use, including multiple products for a single wound if applicable, are medically necessary in order to achieve the best chance at timely wound healing. See provider documentation for details if needed. Substituted dressings applied in the [facility] today, if applicable:     N/a     New orders for this week (labs, imaging, medications, etc.):     STOP NPWT 10/27/21     Look to see if you have the old NPWT device & any dressing kits. If so, bring with you to your appt. Next week. Additional instructions for specific diagnoses:     +ROHO cushion- use at ALL TIMES when sitting!!  +True Balance Air group II mattress      General comments for pressure ulcers: *  Make sure you stay well-hydrated, and maintain good protein intake. *  Reposition at least every two hours, to keep from having pressure in one spot for too long. *  If you are not sure whether you have the best offloading surfaces (mattress, mattress overlay, chair cushion, heel-protector boots, etc), please ask. *  Moisturize your skin regularly with Vaseline, Aquaphor, Aveeno, CeraVe, Cetaphil, Eucerin, Lubriderm, etc; but keep the skin between your toes dry. *  If you smoke, your wound can not heal properly -- please talk with us when you're ready to quit.          F/U Appointment is with Dr. Shawn Murdock in 1 week on                                                at                       .     Your nurse  is ABRAHAM Zambrano      If we applied slip-resistant hospital socks today, be sure to remove them at least once a day to inspect your toes or feet, even if you're not changing the wraps or dressings underneath. If you see anything concerning (redness, excess moisture, etc), please call and let us know right away.      Should you experience any significant changes in your wound(s) (including redness, increased warmth, increased pain, increased drainage, odor, or fever) or have questions about your wound care, please contact the [facility] at [phone] Monday-Thursday from 8:00 am - 4:30 pm, or Friday from 8:00 am - 2:30 pm.  If you need help with your wound outside these hours and cannot wait until we are again available, contact your home-care company (if applicable), your PCP, or go to the nearest emergency room

## 2021-10-28 ENCOUNTER — TELEPHONE (OUTPATIENT)
Dept: WOUND CARE | Age: 71
End: 2021-10-28

## 2021-10-28 LAB — SARS-COV-2: NOT DETECTED

## 2021-10-28 NOTE — TELEPHONE ENCOUNTER
Writer spoke with Francis Holcomb at [facility] to notify that NPWT was discontinued per Dr Justin Torres on 10/27/21. Order also faxed to [phone].

## 2021-10-29 ENCOUNTER — ANESTHESIA EVENT (OUTPATIENT)
Dept: CARDIAC CATH/INVASIVE PROCEDURES | Age: 71
End: 2021-10-29
Payer: MEDICARE

## 2021-10-29 ENCOUNTER — ANESTHESIA (OUTPATIENT)
Dept: CARDIAC CATH/INVASIVE PROCEDURES | Age: 71
End: 2021-10-29
Payer: MEDICARE

## 2021-10-29 ENCOUNTER — HOSPITAL ENCOUNTER (OUTPATIENT)
Dept: CARDIAC CATH/INVASIVE PROCEDURES | Age: 71
Discharge: HOME | End: 2021-10-29
Attending: INTERNAL MEDICINE | Admitting: INTERNAL MEDICINE
Payer: MEDICARE

## 2021-10-29 VITALS — OXYGEN SATURATION: 92 % | SYSTOLIC BLOOD PRESSURE: 89 MMHG | DIASTOLIC BLOOD PRESSURE: 50 MMHG

## 2021-10-29 PROBLEM — I34.0 NONRHEUMATIC MITRAL (VALVE) INSUFFICIENCY: Status: ACTIVE | Noted: 2021-10-29

## 2021-10-29 LAB
ANION GAP SERPL CALCULATED.3IONS-SCNC: 11 MMOL/L (ref 3–16)
BUN BLDV-MCNC: 24 MG/DL (ref 7–20)
CALCIUM SERPL-MCNC: 9.9 MG/DL (ref 8.3–10.6)
CHLORIDE BLD-SCNC: 92 MMOL/L (ref 99–110)
CHOLESTEROL, TOTAL: 112 MG/DL (ref 0–199)
CO2: 33 MMOL/L (ref 21–32)
CREAT SERPL-MCNC: 1 MG/DL (ref 0.6–1.2)
EKG ATRIAL RATE: 71 BPM
EKG DIAGNOSIS: NORMAL
EKG P AXIS: 71 DEGREES
EKG P-R INTERVAL: 160 MS
EKG Q-T INTERVAL: 426 MS
EKG QRS DURATION: 116 MS
EKG QTC CALCULATION (BAZETT): 462 MS
EKG R AXIS: -24 DEGREES
EKG T AXIS: 58 DEGREES
EKG VENTRICULAR RATE: 71 BPM
GFR AFRICAN AMERICAN: >60
GFR NON-AFRICAN AMERICAN: 55
GLUCOSE BLD-MCNC: 192 MG/DL (ref 70–99)
HCT VFR BLD CALC: 34.1 % (ref 36–48)
HDLC SERPL-MCNC: 34 MG/DL (ref 40–60)
HEMOGLOBIN: 11.6 G/DL (ref 12–16)
LDL CHOLESTEROL CALCULATED: 61 MG/DL
MCH RBC QN AUTO: 29.4 PG (ref 26–34)
MCHC RBC AUTO-ENTMCNC: 33.9 G/DL (ref 31–36)
MCV RBC AUTO: 86.6 FL (ref 80–100)
PDW BLD-RTO: 16.6 % (ref 12.4–15.4)
PLATELET # BLD: 246 K/UL (ref 135–450)
PMV BLD AUTO: 7.3 FL (ref 5–10.5)
POTASSIUM SERPL-SCNC: 3.5 MMOL/L (ref 3.5–5.1)
RBC # BLD: 3.94 M/UL (ref 4–5.2)
SODIUM BLD-SCNC: 136 MMOL/L (ref 136–145)
TRIGL SERPL-MCNC: 85 MG/DL (ref 0–150)
VLDLC SERPL CALC-MCNC: 17 MG/DL
WBC # BLD: 4.1 K/UL (ref 4–11)

## 2021-10-29 PROCEDURE — 80048 BASIC METABOLIC PNL TOTAL CA: CPT

## 2021-10-29 PROCEDURE — 85027 COMPLETE CBC AUTOMATED: CPT

## 2021-10-29 PROCEDURE — 93325 DOPPLER ECHO COLOR FLOW MAPG: CPT

## 2021-10-29 PROCEDURE — 3700000001 HC ADD 15 MINUTES (ANESTHESIA)

## 2021-10-29 PROCEDURE — 93325 DOPPLER ECHO COLOR FLOW MAPG: CPT | Performed by: INTERNAL MEDICINE

## 2021-10-29 PROCEDURE — 93010 ELECTROCARDIOGRAM REPORT: CPT | Performed by: INTERNAL MEDICINE

## 2021-10-29 PROCEDURE — 80061 LIPID PANEL: CPT

## 2021-10-29 PROCEDURE — 93315 ECHO TRANSESOPHAGEAL: CPT

## 2021-10-29 PROCEDURE — 93320 DOPPLER ECHO COMPLETE: CPT

## 2021-10-29 PROCEDURE — 93312 ECHO TRANSESOPHAGEAL: CPT | Performed by: INTERNAL MEDICINE

## 2021-10-29 PROCEDURE — 93005 ELECTROCARDIOGRAM TRACING: CPT | Performed by: INTERNAL MEDICINE

## 2021-10-29 PROCEDURE — 3700000000 HC ANESTHESIA ATTENDED CARE

## 2021-10-29 PROCEDURE — 93320 DOPPLER ECHO COMPLETE: CPT | Performed by: INTERNAL MEDICINE

## 2021-10-29 PROCEDURE — 2500000003 HC RX 250 WO HCPCS: Performed by: NURSE ANESTHETIST, CERTIFIED REGISTERED

## 2021-10-29 PROCEDURE — 6360000002 HC RX W HCPCS: Performed by: NURSE ANESTHETIST, CERTIFIED REGISTERED

## 2021-10-29 RX ORDER — SODIUM CHLORIDE 9 MG/ML
INJECTION, SOLUTION INTRAVENOUS ONCE
Status: COMPLETED | OUTPATIENT
Start: 2021-10-29 | End: 2021-10-29

## 2021-10-29 RX ORDER — PROPOFOL 10 MG/ML
INJECTION, EMULSION INTRAVENOUS PRN
Status: DISCONTINUED | OUTPATIENT
Start: 2021-10-29 | End: 2021-10-29 | Stop reason: SDUPTHER

## 2021-10-29 RX ORDER — LIDOCAINE HYDROCHLORIDE 20 MG/ML
INJECTION, SOLUTION INFILTRATION; PERINEURAL PRN
Status: DISCONTINUED | OUTPATIENT
Start: 2021-10-29 | End: 2021-10-29 | Stop reason: SDUPTHER

## 2021-10-29 RX ADMIN — LIDOCAINE HYDROCHLORIDE 60 MG: 20 INJECTION, SOLUTION INFILTRATION; PERINEURAL at 09:52

## 2021-10-29 RX ADMIN — SODIUM CHLORIDE: 9 INJECTION, SOLUTION INTRAVENOUS at 09:42

## 2021-10-29 RX ADMIN — PROPOFOL 50 MG: 10 INJECTION, EMULSION INTRAVENOUS at 09:52

## 2021-10-29 RX ADMIN — PROPOFOL 50 MG: 10 INJECTION, EMULSION INTRAVENOUS at 09:57

## 2021-10-29 RX ADMIN — PROPOFOL 20 MG: 10 INJECTION, EMULSION INTRAVENOUS at 10:01

## 2021-10-29 NOTE — ANESTHESIA PRE PROCEDURE
Department of Anesthesiology  Preprocedure Note       Name:  Aliya Alonso   Age:  80 y.o.  :  1950                                          MRN:  [id number]         Date:  10/29/2021      Surgeon: * Surgery not found *    Procedure:     Medications prior to admission:   Prior to Admission medications    Medication Sig Start Date End Date Taking? Authorizing Provider   etanercept (ENBREL) 50 MG/ML injection Inject 25 mg into the skin once Once weekly on Wednesday   Yes Historical Provider, MD   torsemide (DEMADEX) 20 MG tablet Take 2 tablets by mouth daily If weight 131 lbs or less, decrease to 20 mg daily 21  Yes LORENZO Berrios CNP   metoprolol succinate (TOPROL XL) 25 MG extended release tablet Take 0.5 tablets by mouth daily 21  Yes LORENZO Berrios CNP   spironolactone (ALDACTONE) 25 MG tablet Take 1 tablet by mouth daily 21  Yes LORENZO Berrios CNP   midodrine (PROAMATINE) 2.5 MG tablet Take 1 tablet by mouth 3 times daily 21  Yes LORENZO Berrios CNP   DULoxetine (CYMBALTA) 60 MG extended release capsule Take 60 mg by mouth daily   Yes Historical Provider, MD   DALIRESP 500 MCG tablet TAKE 1 TABLET BY MOUTH DAILY 21  Yes Nina Fernandez MD   clopidogrel (PLAVIX) 75 MG tablet Take 1 tablet by mouth daily 6/10/21  Yes Dereck Ross MD   predniSONE (DELTASONE) 1 MG tablet Take 4 mg by mouth daily   Yes Historical Provider, MD   insulin glargine (LANTUS) 100 UNIT/ML injection vial Inject 15 Units into the skin nightly 5/10/21  Yes LORENZO Brandon CNP   oxyCODONE-acetaminophen (PERCOCET)  MG per tablet Take 1 tablet by mouth daily. Yes Historical Provider, MD   docusate sodium (COLACE) 100 MG capsule Take 100 mg by mouth 2 times daily as needed for Constipation   Yes Historical Provider, MD   gabapentin (NEURONTIN) 300 MG capsule Take 300 mg by mouth 2 times daily.    Yes Historical Provider, MD   latanoprost (XALATAN) 0.005 % ophthalmic solution Place 1 drop into both eyes nightly   Yes Historical Provider, MD   Teriparatide, Recombinant, (FORTEO) 600 MCG/2.4ML SOPN injection Inject 20 mcg into the skin daily   Yes Historical Provider, MD   morphine (MS CONTIN) 15 MG extended release tablet 15 mg 2 times daily. 10/16/20  Yes Historical Provider, MD   ipratropium (ATROVENT) 0.06 % nasal spray USE 2 SPRAYS BY NASAL ROUTE 2-4 TIMES DAILY 10/29/20  Yes Dallin Ramachandran MD   albuterol sulfate  (90 Base) MCG/ACT inhaler INHALE 2 PUFFS INTO THE LUNGS EVERY 4 HOURS AS NEEDED FOR WHEEZING 1/20/20  Yes Tristin Villalta MD   vitamin D (CHOLECALCIFEROL) 1000 UNIT TABS tablet Take 5,000 Units by mouth daily    Yes Historical Provider, MD   calcium carbonate (OSCAL) 500 MG TABS tablet Take 500 mg by mouth daily   Yes Historical Provider, MD   atorvastatin (LIPITOR) 40 MG tablet Take 40 mg by mouth 10/16/18  Yes Historical Provider, MD   cyclobenzaprine (FLEXERIL) 10 MG tablet Take 10 mg by mouth 2 times daily as needed    Yes Historical Provider, MD   Insulin Syringe-Needle U-100 31G X 5/16\" 0.5 ML MISC USE 5 TIMES DAILY 5/30/18  Yes Historical Provider, MD   ACCU-CHEK CEDRICK PLUS strip TEST 4 TIMES DAILY 6/1/18  Yes Historical Provider, MD   aspirin EC 81 MG EC tablet Take 1 tablet by mouth daily 10/23/17  Yes Darlene Johnson MD   insulin lispro (HUMALOG) 100 UNIT/ML injection vial Inject 0-12 Units into the skin 3 times daily (with meals) 10/23/17  Yes Darlene Johnson MD   Misc. Devices (ACAPELLA) MISC Take 1 Device by mouth as needed 9/2/15  Yes Pattie Powell APRN - CNP   fluticasone [facility]) 50 MCG/ACT nasal spray INHALE 2 SPRAYS IN [facility] NOSTRIL DAILY 11/25/13  Yes Dallin Ramachandran MD   cetirizine (ZYRTEC) 10 MG tablet Take 10 mg by mouth daily.      Yes Historical Provider, MD   omeprazole (PRILOSEC) 40 MG capsule Take 40 mg by mouth daily    Yes Historical Provider, MD   NARCAN 4 MG/0.1ML LIQD nasal spray as needed  10/20/20   Historical Provider, MD       Current medications:    Current Facility-Administered Medications   Medication Dose Route Frequency Provider Last Rate Last Admin    0.9 % sodium chloride infusion   IntraVENous Once Booker Grayson MD           Allergies:     Allergies   Allergen Reactions    Atenolol      Cough         Problem List:    Patient Active Problem List   Diagnosis Code    Rheumatoid arthritis ([address] M06.9    Psoriasis L40.9    GERD (gastroesophageal reflux disease) K21.9    Anemia D64.9    Cylindrical bronchiectasis ([address] J47.9    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    Immunocompromised state ([address] D84.9    Coronary artery disease I25.10    Bilateral lower extremity edema R60.0    Essential hypertension I10    Lumbar spondylosis M47.816    Mitral valve insufficiency and aortic valve insufficiency I08.0    Mixed hyperlipidemia E78.2    Myopia of both eyes H52.13    Osteoporosis M81.0    Other chronic sinusitis J32.8    Primary open angle glaucoma (POAG) of both eyes, mild stage H40.1131    Primary osteoarthritis of right hip M16.11    Type 2 diabetes mellitus with unspecified diabetic retinopathy without macular edema ([facility]) E11.319    Type 2 diabetes mellitus with diabetic peripheral angiopathy without gangrene, with long-term current use of insulin ([facility]) E11.51, Z79.4    Pressure ulcer of coccygeal region, stage 4 ([facility]) L89.154    Hx of CABG Z95.1    Mild malnutrition ([facility]) E44.1    Surgical wound dehiscence, initial encounter (at E SVG harvest site) T81.31XA    Chronic obstructive pulmonary disease ([address] J44.9    Hypergranulation L92.9       Past Medical History:        Diagnosis Date    Acute on chronic diastolic heart failure due to coronary artery disease (HCC)     Acute respiratory failure with hypoxia ([facility])     Atherosclerosis of native artery of right lower extremity with rest pain ([address] 07/25/2017    Back pain     Branch retinal vein occlusion 07/20/2012    Bronchiectasis with acute exacerbation ([address]     Cellulitis and abscess of left leg 7/11/2021    Cellulitis of left lower extremity 7/11/2021    S/P vein harvest 05/2021 for CABG    Closed compression fracture of thoracic vertebra ([address] 01/15/2020    Closed fracture of facial bone with routine healing 11/21/2016    Closed jaw fracture ([address] 01/15/2020    Community acquired pneumonia of left lower lobe of lung     Compression fracture of L1 lumbar vertebra ([address] 01/15/2020    COPD (chronic obstructive pulmonary disease) (HCC)     Fracture of tibial plateau, closed, left, initial encounter 12/05/2017    Minimally displaced zone I fracture of sacrum (HCC) 09/02/2020    MRSA (methicillin resistant staph aureus) culture positive 07/2021    MRSA (methicillin resistant Staphylococcus aureus) 07/13/2021    wound    Mucus plugging of bronchi     NSTEMI (non-ST elevated myocardial infarction) ([address] 4/23/2021    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 Bone density test last year showed severe osteoporosis. Recently, two broken vertebrae (L1, L2) due to coughing. Diagnosed with tracheomalacia and stated she must cough very hard to clear phlegm. Was coughing due to upper respiratory infections which have been treated. Hx of laminectomy and recent kyphoplasty.    Refractured her jawbone which was previously repaired wi    Post herpetic neuralgia     Proximal humerus fracture 10/01/2019    Rheumatoid arthritis ([address]     Shingles 05/2020    Sleep apnea     Status post incision and drainage 07/2021    Left Leg    Temporal arteritis ([address] 07/10/2013    Tobacco use 10/19/2017    Tracheomalacia     Vitreous hemorrhage, right eye ([address] 02/21/2020       Past Surgical History:        Procedure Laterality Date    ARTERY BIOPSY Right 03/01/2021    at [address]  05/30/2013    left temporal artery biopsy    BACK SURGERY  2020    BRONCHOSCOPY      BRONCHOSCOPY N/A 2019    BRONCHOSCOPY ALVEOLAR LAVAGE performed by Oma Strong MD at [address]  2021    CATARACT REMOVAL      CORONARY ARTERY BYPASS GRAFT N/A 2021    CORONARY ARTERY BYPASS X3 WITH LEFT ATRIAL APPENDAGE CLIP, 5 LEVEL BILATERAL INTERCOSTAL NERVE BLOCK, STERNAL PLATING performed by Jessica Gil MD at [address]      IR MIDLINE CATH  2021    IR MIDLINE CATH 2021 [id number] [facility] SPECIAL PROCEDURES    KNEE ARTHROSCOPY      left    KYPHOSIS SURGERY      LAMINECTOMY      LEG SURGERY Left 2021    INCISION AND DEBRIDEMENT LEFT LOWER LEG WOUND WITH POSSIBLE WOUND VAC PLACEMENT performed by Cindy Nicholson MD at [facility]  2020    MEDIASTINOSCOPY N/A 2021    MEDIASTINAL EXPLORATION AND EVACUATION OF HEMATOMA performed by Jessica Gil MD at [address]  2021    sacral wound debridement    PRESSURE ULCER DEBRIDEMENT N/A 2021    SACRAL WOUND DEBRIDEMENT performed by Renita Sullivan MD at [facility] SEPTOPLASTY  2013    FESS with balloon    SPINAL FUSION      TUBAL LIGATION      UPPER GASTROINTESTINAL ENDOSCOPY  2014    Dilitation       Social History:    Social History     Tobacco Use    Smoking status: Former Smoker     Packs/day: 1.00     Years: 20.00     Pack years: 20.00     Types: Cigarettes     Quit date: 1991     Years since quittin.5    Smokeless tobacco: Never Used   Substance Use Topics    Alcohol use: Not Currently     Alcohol/week: 0.0 standard drinks     Comment: rarely                                Counseling given: Not Answered      Vital Signs (Current): There were no vitals filed for this visit.                                            BP Readings from Last 3 Encounters:   10/27/21 (!) 110/56   10/21/21 122/78   10/20/21 135/71       NPO Status:                                                                                 BMI:   Wt Readings from Last 3 Encounters:   10/27/21 136 lb (61.7 kg)   10/21/21 140 lb (63.5 kg)   10/20/21 135 lb 9.6 oz (61.5 kg)     There is no height or weight on file to calculate BMI.    CBC:   Lab Results   Component Value Date    WBC 4.1 10/29/2021    RBC 3.94 10/29/2021    HGB 11.6 10/29/2021    HCT 34.1 10/29/2021    MCV 86.6 10/29/2021    RDW 16.6 10/29/2021     10/29/2021       CMP:   Lab Results   Component Value Date     10/29/2021    K 3.5 10/29/2021    K 4.1 08/10/2021    CL 92 10/29/2021    CO2 33 10/29/2021    BUN 24 10/29/2021    CREATININE 1.0 10/29/2021    GFRAA >60 10/29/2021    GFRAA >60 05/07/2013    AGRATIO 0.8 08/14/2021    LABGLOM 55 10/29/2021    GLUCOSE 192 10/29/2021    PROT 6.7 08/14/2021    PROT 7.1 03/21/2013    CALCIUM 9.9 10/29/2021    BILITOT 0.6 08/14/2021    ALKPHOS 117 08/14/2021    AST 9 08/14/2021    ALT 6 08/14/2021       POC Tests: No results for input(s): POCGLU, POCNA, POCK, POCCL, POCBUN, POCHEMO, POCHCT in the last 72 hours.     Coags:   Lab Results   Component Value Date    PROTIME 12.4 07/14/2021    INR 1.09 07/14/2021    APTT 35.9 05/03/2021       HCG (If Applicable): No results found for: PREGTESTUR, PREGSERUM, HCG, HCGQUANT     ABGs:   Lab Results   Component Value Date    PHART 7.434 08/10/2021    PO2ART 46.8 08/10/2021    NPX4ZUY 33.3 08/10/2021    PUS6FXE 21.8 08/10/2021    BEART -2.0 08/10/2021    W7DLRTUC 84.6 08/10/2021        Type & Screen (If Applicable):  No results found for: LABABO, LABRH    Drug/Infectious Status (If Applicable):  No results found for: HIV, HEPCAB    COVID-19 Screening (If Applicable):   Lab Results   Component Value Date    COVID19 Not Detected 10/27/2021    COVID19 Not Detected 06/28/2020           Anesthesia Evaluation  Patient summary reviewed and Nursing notes reviewed no history of anesthetic complications:   Airway: Mallampati: II  TM distance: >3 FB   Neck ROM: full  Mouth opening: > = 3 FB Dental:    (+) upper dentures and edentulous      Pulmonary:   (+) pneumonia:  COPD:  sleep apnea:                             Cardiovascular:    (+) hypertension:, valvular problems/murmurs (h/o Mitral Insuff.):, past MI:, CAD:, CABG/stent (CABG):, CHF: diastolic,                   Neuro/Psych:   Negative Neuro/Psych ROS              GI/Hepatic/Renal: Neg GI/Hepatic/Renal ROS  (+) GERD: well controlled,      (-) liver disease and no renal disease       Endo/Other:    (+) DiabetesType II DM, using insulin, : arthritis: rheumatoid. , .                 Abdominal:             Vascular: negative vascular ROS. Other Findings:           Anesthesia Plan      TIVA     ASA 3       Induction: intravenous. Anesthetic plan and risks discussed with patient. Plan discussed with CRNA. All questions answered and agrees with plan.         Jaswinder Lynne MD   10/29/2021

## 2021-10-29 NOTE — ANESTHESIA POSTPROCEDURE EVALUATION
Department of Anesthesiology  Postprocedure Note    Patient: hSahnaz Greenberg  MRN: [id number]  YOB: 1950  Date of evaluation: 10/29/2021  Time:  2:18 PM     Procedure Summary     Date: 10/29/21 Room / Location: [facility] Cardiac Cath Lab    Anesthesia Start: 9125 Anesthesia Stop: 1007    Procedure: TRANSESOPHAGEAL ECHO Diagnosis:       Nonrheumatic mitral (valve) insufficiency      Nonrheumatic mitral (valve) insufficiency    Scheduled Providers:  Responsible Provider: Sukhjinder Gasca MD    Anesthesia Type: TIVA ASA Status: 3          Anesthesia Type: TIVA    Pratik Phase I:      Pratik Phase II:      Last vitals: Reviewed and per EMR flowsheets.        Anesthesia Post Evaluation    Patient location during evaluation: PACU  Level of consciousness: awake  Airway patency: patent  Nausea & Vomiting: no nausea  Complications: no  Cardiovascular status: blood pressure returned to baseline  Respiratory status: acceptable  Hydration status: euvolemic

## 2021-11-01 NOTE — PROGRESS NOTES
[facility] Progress Note    Evangelista Kim     : 1950    DATE OF VISIT:  10/27/2021    Subjective:     Evangelista Kim is a 80 y.o. female who has a pressure ulcer located on the sacrum. Significant symptoms or pertinent wound history since last visit: feeling ok overall, mild wound pain, no fever, appetite pretty good, doing well with offloading. Additional ulcer(s) noted? yes. Underneath her compression sock and weekly dressing, the left leg ulcer has superficially reopened this week. Her current medication list consists of Acapella, DULoxetine, Insulin Syringe-Needle U-100, Roflumilast, Teriparatide (Recombinant), albuterol sulfate HFA, aspirin EC, atorvastatin, blood glucose test strips, calcium carbonate, cetirizine, clopidogrel, cyclobenzaprine, docusate sodium, etanercept, fluticasone, gabapentin, insulin glargine, insulin lispro, ipratropium, latanoprost, metoprolol succinate, midodrine, morphine, naloxone, omeprazole, oxyCODONE-acetaminophen, predniSONE, spironolactone, torsemide, and vitamin D. Allergies: Atenolol    Objective:     Vitals:    10/27/21 0913   BP: (!) 110/56   Pulse: 81   Resp: 16   Temp: 98.6 °F (37 °C)   TempSrc: Oral   Weight: 136 lb (61.7 kg)   Height: 5' 9\" (1.753 m)     AAOx3, fatigued, NAD  Intact distal pulses, feet warm, good cap refill  Mild-moderate LE edema  No cellulitis, angitis, fluctuance  No acute arthritis or bursitis  No contact dermatitis or cutaneous Candidiasis  Blanca-ulcer skin: indurated, pink, warm and dry.   Ulcer(s): left leg superficially reopened, clean, red, granular, modest drainage; sacral ulcer with some good granulation over most of the visible surfaces, but still that pocket of central depth and superior undermining, with a bit of fascial fibronecrosis again this week, some fibrin and biofilm, no signs of active infection, no bone exposed; wound edges are still a bit steep overall, as well, and I don't know that peripheral angiopathy without gangrene, with long-term current use of insulin (HCC) E11.51, Z79.4    Pressure ulcer of coccygeal region, stage 4 (HCC) L89.154    Hx of CABG Z95.1    Mild malnutrition ([facility]) E44.1    Surgical wound dehiscence, initial encounter (at [facility] SVG harvest site) T81.31XA    Chronic obstructive pulmonary disease (HCC) J44.9    Hypergranulation L92.9    Nonrheumatic mitral (valve) insufficiency I34.0       Assessment of today's active condition(s): RA, Hx back surgery, decreased mobility, stage 4 sacral pressure ulcer, some improvement in recent months, but I don't think the NPWT is still helping enough to justify ongoing use, especially with how it seems to irritate her wound edges at times. Also small left leg wound superficially reopened, but ought to do ok with a slight change in local care, not even necessarily going back to stronger weekly compression wraps at this point. Factors contributing to occurrence and/or persistence of the chronic ulcer include edema, venous stasis, diabetes, chronic pressure, decreased mobility, shear force and immunosuppression. Medical necessity of today's visit is shown by the above documentation. Sharp debridement is indicated today, based upon the exam findings in the wound(s) above. Procedure note:     Consent obtained. Time out performed per [facility] policy. Anesthetic  Anesthetic: 4% Lidocaine Cream     Using a curette, #15 blade scalpel and tissue nippers, I sharply debrided the sacrum ulcer(s) down through and including the removal of muscle/fascia. The type(s) of tissue debrided included fibrin, biofilm, slough and necrotic/eschar. Total Surface Area Debrided: 3 sq cm. The ulcers were then irrigated with normal saline solution. The procedure was completed with a small amount of bleeding, and hemostasis was with pressure. The patient tolerated the procedure well, with no significant complications.  The patient's level of pain during and after the procedure was monitored. Post-debridement measurements, if different from pre-debridement, are in the flowsheet as well. Discharge plan:     Treatment in the wound care center today, per RN documentation: Wound 07/07/21 #3, left medial leg cluster, dehisced surgical, full thickness, onset 5/2021-Dressing/Treatment: Other (comment) (Procellera, Hyrogel, Benzoin and small border)  Wound 12/18/20 #2, Sacrum, Pressure Injury, Stage 4, Onset 5/2020-Dressing/Treatment: Other (comment) (Triad to maegan and in wound, Opticel Ag, Mepilex border). Leave left leg dressing in place for the week. I reminded the patient of the importance of weight management and smoking cessation, if applicable; also encouraged ambulation as tolerated, additional lower extremity exercises as instructed in our education sheet, leg elevation when at rest, and compliance with any recommended dietary, diuretic and compression therapies. She can continue her mild compression socks for now. Keep up with protein intake and glucose control. Will contact [place] to arrange return of NPWT unit and . Home treatment: good handwashing before and after any dressing changes. Cleanse wound with saline or soap & water before dressing change. May use Vaseline (petrolatum), Aquaphor, Aveeno, CeraVe, Cetaphil, Eucerin, Lubriderm, etc for dry skin. Dressing type for home: Hypochlorous acid spray, Triad dressing, Silver alginate (moisten if wound becomes dry) and Dry cover dressing to the sacral ulcer, three times weekly for now -- will probably go to something different (collagen, Multidex, etc), once the fibrosis seems completely resolved. Written discharge instructions given to patient. Follow up in 1 week.     Electronically signed by Shobha De Leon MD on 11/1/2021 at 3:11 PM.

## 2021-11-03 ENCOUNTER — HOSPITAL ENCOUNTER (OUTPATIENT)
Dept: WOUND CARE | Age: 71
Discharge: HOME | End: 2021-11-03
Payer: MEDICARE

## 2021-11-03 VITALS
HEART RATE: 102 BPM | RESPIRATION RATE: 16 BRPM | DIASTOLIC BLOOD PRESSURE: 62 MMHG | WEIGHT: 134.4 LBS | SYSTOLIC BLOOD PRESSURE: 117 MMHG | HEIGHT: 69 IN | BODY MASS INDEX: 19.91 KG/M2 | TEMPERATURE: 97 F

## 2021-11-03 DIAGNOSIS — L89.154 PRESSURE ULCER OF COCCYGEAL REGION, STAGE 4 (HCC): ICD-10-CM

## 2021-11-03 DIAGNOSIS — T81.31XA SURGICAL WOUND DEHISCENCE, INITIAL ENCOUNTER: Primary | ICD-10-CM

## 2021-11-03 PROCEDURE — 97597 DBRDMT OPN WND 1ST 20 CM/<: CPT

## 2021-11-03 PROCEDURE — 97597 DBRDMT OPN WND 1ST 20 CM/<: CPT | Performed by: INTERNAL MEDICINE

## 2021-11-03 RX ORDER — BACITRACIN ZINC AND POLYMYXIN B SULFATE 500; 1000 [USP'U]/G; [USP'U]/G
OINTMENT TOPICAL ONCE
Status: DISCONTINUED | OUTPATIENT
Start: 2021-11-03 | End: 2021-11-04 | Stop reason: HOSPADM

## 2021-11-03 RX ORDER — LIDOCAINE 40 MG/G
CREAM TOPICAL ONCE
Status: CANCELLED | OUTPATIENT
Start: 2021-11-03 | End: 2021-11-03

## 2021-11-03 RX ORDER — LIDOCAINE HYDROCHLORIDE 40 MG/ML
SOLUTION TOPICAL ONCE
Status: CANCELLED | OUTPATIENT
Start: 2021-11-03 | End: 2021-11-03

## 2021-11-03 RX ORDER — LIDOCAINE 50 MG/G
OINTMENT TOPICAL ONCE
Status: CANCELLED | OUTPATIENT
Start: 2021-11-03 | End: 2021-11-03

## 2021-11-03 RX ORDER — LIDOCAINE 40 MG/G
CREAM TOPICAL ONCE
Status: DISCONTINUED | OUTPATIENT
Start: 2021-11-03 | End: 2021-11-04 | Stop reason: HOSPADM

## 2021-11-03 RX ORDER — BACITRACIN ZINC AND POLYMYXIN B SULFATE 500; 1000 [USP'U]/G; [USP'U]/G
OINTMENT TOPICAL ONCE
Status: CANCELLED | OUTPATIENT
Start: 2021-11-03 | End: 2021-11-03

## 2021-11-03 ASSESSMENT — PAIN SCALES - GENERAL: PAINLEVEL_OUTOF10: 0

## 2021-11-03 NOTE — PLAN OF CARE
[address] [facility],[address] Floor:      [address] f: [phone] f: [phone] p: [phone] [email or web address]     Ordering Center:     [address]  [address] [facility]  [phone]  Dept: [phone]   Fax# [phone]    Patient Information:      Gonzalo Rosado  [address]   [phone]   : 1950  AGE: 80 y.o. GENDER: female   TODAYS DATE:  11/3/2021    Insurance:      PRIMARY INSURANCE:  Plan: Christain Lush ESSENTIAL/PLUS  Coverage: [employer] MEDICARE  Effective Date: 2015  Group Number: [unfilled]  Subscriber Number: [id number] - (Medicare Managed)    Payor/Plan Subscr  Sex Relation Sub. Ins. ID Effective Group Num   1.  [id number] Jack Hall A 1950 Female Self [id number] 1/1/15 [id number]                                   [address]         Patient Wound Information:     Additional ICD-10 Codes: L89.154    Patient Active Problem List   Diagnosis Code    Rheumatoid arthritis ([facility] M06.9    Psoriasis L40.9    GERD (gastroesophageal reflux disease) K21.9    Anemia D64.9    Cylindrical bronchiectasis (HCC) J47.9    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    Immunocompromised state ([facility] D84.9    Coronary artery disease I25.10    Bilateral lower extremity edema R60.0    Essential hypertension I10    Lumbar spondylosis M47.816    Mitral valve insufficiency and aortic valve insufficiency I08.0    Mixed hyperlipidemia E78.2    Myopia of both eyes H52.13    Osteoporosis M81.0    Other chronic sinusitis J32.8    Primary open angle glaucoma (POAG) of both eyes, mild stage H40.1131    Primary osteoarthritis of right hip M16.11    Type 2 diabetes mellitus with unspecified diabetic retinopathy without macular edema (HCC) E11.319    Type 2 diabetes mellitus with diabetic peripheral angiopathy without gangrene, with long-term current use of insulin (HCC) E11.51, Z79.4    Pressure ulcer of coccygeal region, stage 4 ([facility]) L89.154    Hx of CABG Z95.1    Mild malnutrition ([facility]) E44.1    Surgical wound dehiscence, initial encounter (at [facility] harvest site) T81.31XA    Chronic obstructive pulmonary disease (HCC) J44.9    Hypergranulation L92.9    Nonrheumatic mitral (valve) insufficiency I34.0       WOUNDS REQUIRING DRESSING SUPPLIES:     Negative Pressure Wound Therapy Leg Left;Medial (Active)   Number of days: 83       Wound 12/18/20 #2, Sacrum, Pressure Injury, Stage 4, Onset 5/2020 (Active)   Wound Image   11/03/21 0916   Wound Etiology Pressure Stage  4 11/03/21 0916   Dressing Status New dressing applied;Clean;Dry; Intact 10/27/21 1017   Wound Cleansed Irrigated with saline; Soap and water 11/03/21 0916   Dressing/Treatment Other (comment) 10/27/21 1017   Wound Length (cm) 3 cm 11/03/21 0916   Wound Width (cm) 0.8 cm 11/03/21 0916   Wound Depth (cm) 1 cm 11/03/21 0916   Wound Surface Area (cm^2) 2.4 cm^2 11/03/21 0916   Change in Wound Size % (l*w) -1500 11/03/21 0916   Wound Volume (cm^3) 2.4 cm^3 11/03/21 0916   Wound Healing % -2567 11/03/21 0916   Post-Procedure Length (cm) 3 cm 11/03/21 0948   Post-Procedure Width (cm) 0.8 cm 11/03/21 0948   Post-Procedure Depth (cm) 1 cm 11/03/21 0948   Post-Procedure Surface Area (cm^2) 2.4 cm^2 11/03/21 0948   Post-Procedure Volume (cm^3) 2.4 cm^3 11/03/21 0948   Distance Tunneling (cm) 0 cm 11/03/21 0948   Tunneling Position ___ O'Clock 12 10/27/21 0947   Undermining Starts ___ O'Clock 11 09/22/21 0921   Undermining Ends___ O'Clock 12 09/22/21 0921   Undermining Maxium Distance (cm) 0 11/03/21 0948   Wound Assessment Pink/red 11/03/21 0916   Drainage Amount Moderate 11/03/21 0916   Drainage Description Serous 10/20/21 0916   Odor None 11/03/21 0916   Blanca-wound Assessment Maceration 11/03/21 0916   Margins Defined edges 08/25/21 0913   Number of days: 320       Wound 07/12/21 Knee Left; Inner purple (Active)   Number of days: 113       Wound 07/12/21 Ankle Left; Inner purple (Active)   Number of days: 113     Incision 07/13/21 Leg Left (Active)   Number of days: 113       Supplies Requested :      WOUND #: 2   PRIMARY DRESSING:    Collagen with silver   Cover and Secure with:  Other 4x4 loaf (bulk), small sacral mepilex border     FREQUENCY OF DRESSING CHANGES:  Three times per week    Wound Thickness [x] Full   []Partial       Patient Wound(s) Debrided: [x] Yes   [] No    Debridement Date: 11/3/2021    Debribement Type: Excisional/Sharp    ADDITIONAL ITEMS:  [] Gloves Small  [x] Gloves Medium [] Gloves Large [] Gloves Greta Chambers  [] Paper Tape 1\" [] Paper Tape 2\" [] Paper Tape 3\"  [] Medipore Tape 3\"  [] Saline  [] Skin Prep   [] Adhesive Remover   [] Cotton Tip Applicators  [] Tubular Stocking   [] Size E  [] Size G  [] Other:    Patient currently being seen by Home Health: [x] Yes   [] No    Duration for needed supplies:  []15  [x]30  []60  []90 Days    Provider Information:      PROVIDER'S NAME/NPI  Dr. Irais Frost  NPI [id number]  I give permission to coordinate the care for this patient

## 2021-11-03 NOTE — PLAN OF CARE
[address] [facility] Physician Orders and Discharge [address]  [address], [address]  [place], [facility]  Telephone: [phone]      Fax: [phone] home care Madiha Escobedo  Nurse Alonzo Kunz [phone]      Your wound-care supplies will be provided by: We are ordering your wound-care supplies from [employer]. Please note, depending on your insurance coverage, you may have out-of-pocket expenses for these supplies. Someone from [facility] should call you to confirm your order and discuss those potential costs before they ship your products -- please anticipate that call. If your out-of-pocket cost could be substantial, [facility] has a financial hardship program for patients who qualify, so please ask about that if you might need a hand. If you have any questions about your supplies or your potential out-of-pocket costs, or if you need to place an order for a refill of supplies (typically monthly), please call [phone].      NAME:  Gris Taveras   DATE of BIRTH:  1950  PRIMARY DIAGNOSIS FOR WOUND CARE CENTER:  Pressure ulcer     Wound cleansing:   Do not scrub or use excessive force. Wash hands with soap and water before and after dressing changes. Prior to applying a clean dressing, cleanse wound with normal saline, wound cleanser, or mild soap and water. Ask your physician or nurse before getting the wound(s) wet in the shower.                Wound care for home:        LEFT MEDIAL LOWER LEG WOUND: No dressing needed this week  May moisturize dry skin with a good moisturizer of your choice.      Continue wearing your compression stockings for edema control.    Place first thing in the morning & may remove at bedtime.        Sacral Wound:   Vashe or Hypochlorous acid soaked gauze applied to wound for 2-3 minutes, no need to rinse  Triad or Zinc Oxide to maegan-wound  Collagen with silver to wound bed & tuck into depth of wound   Fluff gauze over wound  Cover with small sacral mepilex border  C to change dressing on Friday, Monday         Please note, all wounds (unless stated otherwise here) were mechanically debrided at the time of cleansing here in the wound-care center today, so a small amount of pain, drainage or bleeding from that process might be expected, and is normal.      All products for home use, including multiple products for a single wound if applicable, are medically necessary in order to achieve the best chance at timely wound healing. See provider documentation for details if needed.     Substituted dressings applied in the [facility] today, if applicable:     N/a     New orders for this week (labs, imaging, medications, etc.):     Will order collagen, gauze & cover dressing through [facility]        Additional instructions for specific diagnoses:     +ROHO cushion- use at [address] when sitting!!  +True Balance Air group II mattress      General comments for pressure ulcers:  *  Make sure you stay well-hydrated, and maintain good protein intake. *  Reposition at least every two hours, to keep from having pressure in one spot for too long. *  If you are not sure whether you have the best offloading surfaces (mattress, mattress overlay, chair cushion, heel-protector boots, etc), please ask. *  Moisturize your skin regularly with Vaseline, Aquaphor, Aveeno, CeraVe, Cetaphil, Eucerin, Lubriderm, etc; but keep the skin between your toes dry. *  If you smoke, your wound can not heal properly -- please talk with us when you're ready to quit.         F/U Appointment is with Dr. Flaca Perez in 1 week on                                                at                       .     Your nurse  is ABRAHAM Zambrano      If we applied slip-resistant hospital socks today, be sure to remove them at least once a day to inspect your toes or feet, even if you're not changing the wraps or dressings underneath.  If you see anything concerning (redness, excess moisture, etc), please call and let us know right away.     Should you experience any significant changes in your wound(s) (including redness, increased warmth, increased pain, increased drainage, odor, or fever) or have questions about your wound care, please contact the [address] [place] [address] at [phone] Monday-Thursday from 8:00 am - 4:30 pm, or Friday from 8:00 am - 2:30 pm.  If you need help with your wound outside these hours and cannot wait until we are again available, contact your home-care company (if applicable), your PCP, or go to the nearest emergency room

## 2021-11-03 NOTE — PLAN OF CARE
Leg wound healed this week, no dressing needed this week. Pt. To cont. With wearing her compression garment for edema control. Sacral wound stable, debridement & Ag Nitrate per MD & pt. Tolerated well. Pt. States she has returned her NPWT to [place]. Will change to using Collagen to wound, fluff gauze, cover with dry dressing, change 3x/week. [facility] cont. To follow. Will order dressing supplies through [facility]. F/u in [facility] in 1 week as ordered, pt. Aware to call sooner with any changes or questions/concerns. Discharge instructions reviewed with patient & sister, all questions answered, copy given to patient. Dressings were applied to all wounds per M.D. Instructions at this visit.

## 2021-11-09 NOTE — PROGRESS NOTES
[address] [facility] Progress Note    Noemy Naranjo     : 1950    DATE OF VISIT:  11/3/2021    Subjective:     Noemy Naranjo is a 80 y.o. female who has a pressure ulcer located on the sacrum. Significant symptoms or pertinent wound history since last visit: feeling pretty well overall, mild wound pain, no fever, doing ok with current dressings. Able to tolerate her Enbrel at about half the typical frequency, in terms of her joint pains (she's trying to keep it to a reasonable minimum to help with wound healing). Additional ulcer(s) noted? no. Left leg seems healed again. Her current medication list consists of Acapella, DULoxetine, Insulin Syringe-Needle U-100, Roflumilast, Teriparatide (Recombinant), albuterol sulfate HFA, aspirin EC, atorvastatin, blood glucose test strips, calcium carbonate, cetirizine, clopidogrel, cyclobenzaprine, docusate sodium, etanercept, fluticasone, gabapentin, insulin glargine, insulin lispro, ipratropium, latanoprost, metoprolol succinate, midodrine, morphine, naloxone, omeprazole, oxyCODONE-acetaminophen, predniSONE, spironolactone, torsemide, and vitamin D. Allergies: Atenolol    Objective:     Vitals:    21 0913   BP: 117/62   Pulse: 102   Resp: 16   Temp: 97 °F (36.1 °C)   TempSrc: Oral   Weight: 134 lb 6.4 oz (61 kg)   Height: 5' 9\" (1.753 m)     AAOx3, fatigued, NAD  Intact distal pulses, feet warm, good cap refill  Mild-moderate LE edema  No cellulitis, angitis, fluctuance  No acute arthritis or bursitis  No contact dermatitis or cutaneous Candidiasis  Blanca-ulcer skin: indurated, pink, warm and dry.   Ulcer(s): left leg healed; sacral ulcer with some good granulation over most of the visible surfaces, but still that pocket of central depth and superior undermining, but with less fibronecrosis there, some fibrin and biofilm, no signs of active infection, no bone exposed; wound edges are still a bit steep overall, but I do think she's getting a bit of new epidermal tissue every week or two. Photos also saved in electronic chart.     Today's wound measurements, per RN documentation:  Wound 12/18/20 #2, Sacrum, Pressure Injury, Stage 4, Onset 5/2020-Wound Length (cm): 3 cm  [REMOVED] Wound 07/07/21 #3, left medial leg cluster, dehisced surgical, full thickness, onset 5/2021-Wound Length (cm): 0 cm    Wound 12/18/20 #2, Sacrum, Pressure Injury, Stage 4, Onset 5/2020-Wound Width (cm): 0.8 cm  [REMOVED] Wound 07/07/21 #3, left medial leg cluster, dehisced surgical, full thickness, onset 5/2021-Wound Width (cm): 0 cm    Wound 12/18/20 #2, Sacrum, Pressure Injury, Stage 4, Onset 5/2020-Wound Depth (cm): 1 cm  [REMOVED] Wound 07/07/21 #3, left medial leg cluster, dehisced surgical, full thickness, onset 5/2021-Wound Depth (cm): 0 cm    Assessment:     Patient Active Problem List   Diagnosis Code    [facility] M06.[facility]    Psoriasis L40.9    GERD (gastroesophageal reflux disease) K21.9    Anemia D64.9    Cylindrical bronchiectasis ([facility]) J47.9    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    Immunocompromised state ([facility] D84.9    Coronary artery disease I25.10    Bilateral lower extremity edema R60.0    Essential hypertension I10    Lumbar spondylosis M47.816    Mitral valve insufficiency and aortic valve insufficiency I08.0    Mixed hyperlipidemia E78.2    Myopia of both eyes H52.13    Osteoporosis M81.0    Other chronic sinusitis J32.8    Primary open angle glaucoma (POAG) of both eyes, mild stage H40.1131    Primary osteoarthritis of right hip M16.11    Type 2 diabetes mellitus with unspecified diabetic retinopathy without macular edema ([facility]) E11.319    Type 2 diabetes mellitus with diabetic peripheral angiopathy without gangrene, with long-term current use of insulin ([facility]) E11.51, Z79.4    Pressure ulcer of coccygeal region, stage 4 ([facility]) L89.154    Hx of CABG Z95.1    Mild malnutrition ([facility]) E44.1    Surgical wound dehiscence, initial encounter (at [facility] harvest site) T81.31XA    Chronic obstructive pulmonary disease (HCC) J44.9    Hypergranulation L92.9    Nonrheumatic mitral (valve) insufficiency I34.0       Assessment of today's active condition(s): RA, Hx back surgery, decreased mobility, stage 4 sacral pressure ulcer, prior soft tissue infection, never had osteomyelitis, very slow healing, but making some progress, and I think at this point might do as well without NPWT as she was doing with it. Factors contributing to occurrence and/or persistence of the chronic ulcer include chronic pressure, decreased mobility, shear force and immunosuppression. Medical necessity of today's visit is shown by the above documentation. Sharp debridement is indicated today, based upon the exam findings in the wound(s) above. Procedure note:     Consent obtained. Time out performed per [facility]. Anesthetic  Anesthetic: 4% Lidocaine Cream     Using a curette, I sharply debrided the sacrum ulcer(s) down through and including the removal of dermis. The type(s) of tissue debrided included fibrin, biofilm and necrotic/eschar. Total Surface Area Debrided: 3 sq cm. The ulcers were then irrigated with normal saline solution. The procedure was completed with a small amount of bleeding, and hemostasis was with pressure. The patient tolerated the procedure well, with no significant complications. The patient's level of pain during and after the procedure was monitored. Post-debridement measurements, if different from pre-debridement, are in the flowsheet as well. Discharge plan:     Treatment in the wound care center today, per RN documentation: Wound 12/18/20 #2, Sacrum, Pressure Injury, Stage 4, Onset 5/2020-Dressing/Treatment:  (zinc oxide purachol ag fluff gauze mepilex border). Keep up the good work with protein intake and offloading. Home treatment: good handwashing before and after any dressing changes.  Cleanse wound with saline

## 2021-11-10 ENCOUNTER — HOSPITAL ENCOUNTER (OUTPATIENT)
Dept: WOUND CARE | Age: 71
Discharge: HOME | End: 2021-11-10
Payer: MEDICARE

## 2021-11-10 VITALS
HEART RATE: 83 BPM | TEMPERATURE: 98 F | BODY MASS INDEX: 20.21 KG/M2 | SYSTOLIC BLOOD PRESSURE: 113 MMHG | HEIGHT: 69 IN | RESPIRATION RATE: 16 BRPM | WEIGHT: 136.47 LBS | DIASTOLIC BLOOD PRESSURE: 57 MMHG

## 2021-11-10 DIAGNOSIS — T81.31XA SURGICAL WOUND DEHISCENCE, INITIAL ENCOUNTER: Primary | ICD-10-CM

## 2021-11-10 DIAGNOSIS — L89.154 PRESSURE ULCER OF COCCYGEAL REGION, STAGE 4 (HCC): ICD-10-CM

## 2021-11-10 PROCEDURE — 11042 DBRDMT SUBQ TIS 1ST 20SQCM/<: CPT

## 2021-11-10 PROCEDURE — 11042 DBRDMT SUBQ TIS 1ST 20SQCM/<: CPT | Performed by: INTERNAL MEDICINE

## 2021-11-10 RX ORDER — LIDOCAINE HYDROCHLORIDE 40 MG/ML
SOLUTION TOPICAL ONCE
Status: CANCELLED | OUTPATIENT
Start: 2021-11-10 | End: 2021-11-10

## 2021-11-10 RX ORDER — BACITRACIN ZINC AND POLYMYXIN B SULFATE 500; 1000 [USP'U]/G; [USP'U]/G
OINTMENT TOPICAL ONCE
Status: CANCELLED | OUTPATIENT
Start: 2021-11-10 | End: 2021-11-10

## 2021-11-10 RX ORDER — LIDOCAINE 50 MG/G
OINTMENT TOPICAL ONCE
Status: CANCELLED | OUTPATIENT
Start: 2021-11-10 | End: 2021-11-10

## 2021-11-10 RX ORDER — BACITRACIN ZINC AND POLYMYXIN B SULFATE 500; 1000 [USP'U]/G; [USP'U]/G
OINTMENT TOPICAL ONCE
Status: DISCONTINUED | OUTPATIENT
Start: 2021-11-10 | End: 2021-11-11 | Stop reason: HOSPADM

## 2021-11-10 RX ORDER — LIDOCAINE 40 MG/G
CREAM TOPICAL ONCE
Status: CANCELLED | OUTPATIENT
Start: 2021-11-10 | End: 2021-11-10

## 2021-11-10 RX ORDER — LIDOCAINE 40 MG/G
CREAM TOPICAL ONCE
Status: DISCONTINUED | OUTPATIENT
Start: 2021-11-10 | End: 2021-11-11 | Stop reason: HOSPADM

## 2021-11-10 ASSESSMENT — PAIN SCALES - GENERAL: PAINLEVEL_OUTOF10: 0

## 2021-11-10 NOTE — PLAN OF CARE
Wound stable without much change, debridement per MD & pt. Tolerated well. Will cont. With current wound care regime with dressing changes. Pt. States she is cont. With good off-loading & frequent repositioning from side to side. Pt. Is also supplementing with protein drink. F/u in [facility] in 1 week as ordered, pt. Aware to call sooner with any changes or questions/concerns. Discharge instructions reviewed with patient & sister, all questions answered, copy given to patient. Dressings were applied to all wounds per M.D. Instructions at this visit.

## 2021-11-10 NOTE — PLAN OF CARE
[address] [facility] Physician Orders and Discharge [address] [facility]  [address], [address]  [place]  Telephone: [phone]      Fax: [phone] home care Madiha Escobedo  Nurse Hellen Ya [phone]      Your wound-care supplies will be provided by: [facility] orders supplies through [facility]. Please note, depending on your insurance coverage, you may have out-of-pocket expenses for these supplies. Someone from [facility] may call you to confirm your order and discuss those potential costs before they ship your products -- please anticipate that call. If your out-of-pocket cost is going to be less than $200, and [facility] cannot reach you, they may ship your supplies as soon as the order is processed, and will send you a bill. If your out-of-pocket cost is going to be more than $200, [facility] should not ship your supplies until they speak with you. If you have any questions about your supplies or your potential out-of-pocket costs, or if you need to place an order for a refill of supplies (typically monthly), Karyle Bruins is your local representative, and can be reached at [phone]. Information on [facility]'s return policy will also be enclosed with your supplies -- please read that carefully before opening your items.      NAME:  Gris Taveras   DATE of BIRTH:  1950  PRIMARY DIAGNOSIS FOR WOUND CARE CENTER:  Pressure ulcer     Wound cleansing:   Do not scrub or use excessive force. Wash hands with soap and water before and after dressing changes. Prior to applying a clean dressing, cleanse wound with normal saline, wound cleanser, or mild soap and water.  Ask your physician or nurse before getting the wound(s) wet in the shower.                Wound care for home:        LEFT MEDIAL LOWER LEG WOUND: No dressing needed this week  May moisturize dry skin with a good moisturizer of your choice.      Continue wearing your compression stockings for edema control.    Place first thing in the morning & may remove at bedtime.         Sacral Wound:   Vashe or Hypochlorous acid soaked gauze applied to wound for 2-3 minutes, no need to rinse  Triad or Zinc Oxide to maegan-wound  Collagen with silver to wound bed & tuck into depth of wound  Fluff gauze over wound  Cover with small sacral mepilex border  [facility] to change dressing on Friday, Monday         Please note, all wounds (unless stated otherwise here) were mechanically debrided at the time of cleansing here in the wound-care center today, so a small amount of pain, drainage or bleeding from that process might be expected, and is normal.      All products for home use, including multiple products for a single wound if applicable, are medically necessary in order to achieve the best chance at timely wound healing. See provider documentation for details if needed.     Substituted dressings applied in the [address] [place] [address],[address] today, if applicable:     N/a     New orders for this week (labs, imaging, medications, etc.):     N/a       Additional instructions for specific diagnoses:     +ROHO cushion- use at [address] when sitting!!  +True Balance Air group II mattress      General comments for pressure ulcers:  *  Make sure you stay well-hydrated, and maintain good protein intake. *  Reposition at least every two hours, to keep from having pressure in one spot for too long. *  If you are not sure whether you have the best offloading surfaces (mattress, mattress overlay, chair cushion, heel-protector boots, etc), please ask. *  Moisturize your skin regularly with Vaseline, Aquaphor, Aveeno, CeraVe, Cetaphil, Eucerin, Lubriderm, etc; but keep the skin between your toes dry.   *  If you smoke, your wound can not heal properly -- please talk with us when you're ready to quit.         F/U Appointment is with Dr. Lázaro Telles in 1 week on Chay Fajardo.     Your nurse  is Kenya RN      If we applied slip-resistant hospital socks today, be sure to remove them at least once a day to inspect your toes or feet, even if you're not changing the wraps or dressings underneath.  If you see anything concerning (redness, excess moisture, etc), please call and let us know right away.     Should you experience any significant changes in your wound(s) (including redness, increased warmth, increased pain, increased drainage, odor, or fever) or have questions about your wound care, please contact the [address] [place] [address] at [phone] Monday-Thursday from 8:00 am - 4:30 pm, or Friday from 8:00 am - 2:30 pm.  If you need help with your wound outside these hours and cannot wait until we are again available, contact your home-care company (if applicable), your PCP, or go to the nearest emergency room

## 2021-11-14 PROBLEM — L92.9 HYPERGRANULATION: Status: RESOLVED | Noted: 2021-08-28 | Resolved: 2021-11-14

## 2021-11-14 PROBLEM — T81.31XA SURGICAL WOUND DEHISCENCE, INITIAL ENCOUNTER: Status: RESOLVED | Noted: 2021-07-12 | Resolved: 2021-11-14

## 2021-11-14 NOTE — PROGRESS NOTES
88 [facility] Progress Note    Christopher Kee     : 1950    DATE OF VISIT:  11/10/2021    Subjective:     Christopher Kee is a 80 y.o. female who has a pressure ulcer located on the sacrum. Significant symptoms or pertinent wound history since last visit: feeling ok, not much wound pain at all, RA pain more or less tolerable with QOW Enbrel. No fever or chills, doing fine with current dressings, staying offloaded, taking in some extra protein. Additional ulcer(s) noted? no. Left leg has remained healed this time. Her current medication list consists of Acapella, DULoxetine, Insulin Syringe-Needle U-100, Roflumilast, Teriparatide (Recombinant), albuterol sulfate HFA, aspirin EC, atorvastatin, blood glucose test strips, calcium carbonate, cetirizine, clopidogrel, cyclobenzaprine, docusate sodium, etanercept, fluticasone, gabapentin, insulin glargine, insulin lispro, ipratropium, latanoprost, metoprolol succinate, midodrine, morphine, naloxone, omeprazole, oxyCODONE-acetaminophen, predniSONE, spironolactone, torsemide, and vitamin D. Allergies: Atenolol    Objective:     Vitals:    11/10/21 0920   BP: (!) 113/57   Pulse: 83   Resp: 16   Temp: 98 °F (36.7 °C)   TempSrc: Oral   Weight: 136 lb 7.5 oz (61.9 kg)   Height: 5' 9\" (1.753 m)     AAOx3, fatigued, NAD  Mild-moderate LE edema  No cellulitis, angitis, fluctuance  No acute arthritis or bursitis  No contact dermatitis or cutaneous Candidiasis  Blanca-ulcer skin: indurated, pink, warm and dry. Ulcer(s): left leg healed; sacral ulcer with some good granulation over most of the visible surfaces, but still that pocket of central depth and superior undermining, but with less SQ fibronecrosis there, some fibrin and biofilm, no signs of active infection, no bone exposed; wound edges are still a bit steep overall, but I do think she's getting a bit of new epidermal tissue every week or two.  Photos also saved in electronic chart.    Today's wound measurements, per RN documentation:  Wound 12/18/20 #2, Sacrum, Pressure Injury, Stage 4, Onset 5/2020-Wound Length (cm): 3 cm    Wound 12/18/20 #2, Sacrum, Pressure Injury, Stage 4, Onset 5/2020-Wound Width (cm): 0.8 cm    Wound 12/18/20 #2, Sacrum, Pressure Injury, Stage 4, Onset 5/2020-Wound Depth (cm): 1.2 cm    Assessment:     Patient Active Problem List   Diagnosis Code    Rheumatoid arthritis ([facility] M06.9    Psoriasis L40.9    GERD (gastroesophageal reflux disease) K21.9    Anemia D64.9    Cylindrical bronchiectasis ([facility]) J47.9    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    Immunocompromised state ([facility] D84.9    Coronary artery disease I25.10    Bilateral lower extremity edema R60.0    Essential hypertension I10    Lumbar spondylosis M47.816    Mitral valve insufficiency and aortic valve insufficiency I08.0    Mixed hyperlipidemia E78.2    Myopia of both eyes H52.13    Osteoporosis M81.0    Other chronic sinusitis J32.8    Primary open angle glaucoma (POAG) of both eyes, mild stage H40.1131    Primary osteoarthritis of right hip M16.11    Type 2 diabetes mellitus with unspecified diabetic retinopathy without macular edema ([facility]) E11.319    Type 2 diabetes mellitus with diabetic peripheral angiopathy without gangrene, with long-term current use of insulin ([facility]) E11.51, Z79.4    Pressure ulcer of coccygeal region, stage 4 ([facility]) L89.154    Hx of CABG Z95.1    Mild malnutrition ([facility]) E44.1    Chronic obstructive pulmonary disease ([facility]) J44.9    Nonrheumatic mitral (valve) insufficiency I34.0       Assessment of today's active condition(s): RA, Hx back surgery, decreased mobility, stage 4 sacral pressure ulcer, no signs of infection, healing stalled with NPWT, so back to more standard dressings, very slowly doing ok; left leg surgical site dehiscence healed.  Factors contributing to occurrence and/or persistence of the chronic ulcer include diabetes, chronic pressure, decreased

## 2021-11-17 ENCOUNTER — HOSPITAL ENCOUNTER (OUTPATIENT)
Dept: WOUND CARE | Age: 71
Discharge: HOME | End: 2021-11-17
Payer: MEDICARE

## 2021-11-17 VITALS
BODY MASS INDEX: 20.17 KG/M2 | DIASTOLIC BLOOD PRESSURE: 70 MMHG | TEMPERATURE: 97.3 F | RESPIRATION RATE: 16 BRPM | HEIGHT: 69 IN | WEIGHT: 136.2 LBS | HEART RATE: 83 BPM | SYSTOLIC BLOOD PRESSURE: 127 MMHG

## 2021-11-17 DIAGNOSIS — L89.154 PRESSURE ULCER OF COCCYGEAL REGION, STAGE 4 (HCC): Primary | ICD-10-CM

## 2021-11-17 PROCEDURE — 11042 DBRDMT SUBQ TIS 1ST 20SQCM/<: CPT

## 2021-11-17 PROCEDURE — 11042 DBRDMT SUBQ TIS 1ST 20SQCM/<: CPT | Performed by: INTERNAL MEDICINE

## 2021-11-17 RX ORDER — BACITRACIN ZINC AND POLYMYXIN B SULFATE 500; 1000 [USP'U]/G; [USP'U]/G
OINTMENT TOPICAL ONCE
Status: CANCELLED | OUTPATIENT
Start: 2021-11-17 | End: 2021-11-17

## 2021-11-17 RX ORDER — LIDOCAINE 50 MG/G
OINTMENT TOPICAL ONCE
Status: CANCELLED | OUTPATIENT
Start: 2021-11-17 | End: 2021-11-17

## 2021-11-17 RX ORDER — BACITRACIN ZINC AND POLYMYXIN B SULFATE 500; 1000 [USP'U]/G; [USP'U]/G
OINTMENT TOPICAL ONCE
Status: DISCONTINUED | OUTPATIENT
Start: 2021-11-17 | End: 2021-11-18 | Stop reason: HOSPADM

## 2021-11-17 RX ORDER — LIDOCAINE 40 MG/G
CREAM TOPICAL ONCE
Status: CANCELLED | OUTPATIENT
Start: 2021-11-17 | End: 2021-11-17

## 2021-11-17 RX ORDER — LIDOCAINE 40 MG/G
CREAM TOPICAL ONCE
Status: DISCONTINUED | OUTPATIENT
Start: 2021-11-17 | End: 2021-11-18 | Stop reason: HOSPADM

## 2021-11-17 RX ORDER — LIDOCAINE HYDROCHLORIDE 40 MG/ML
SOLUTION TOPICAL ONCE
Status: CANCELLED | OUTPATIENT
Start: 2021-11-17 | End: 2021-11-17

## 2021-11-17 ASSESSMENT — PAIN SCALES - GENERAL
PAINLEVEL_OUTOF10: 0
PAINLEVEL_OUTOF10: 0

## 2021-11-17 NOTE — PLAN OF CARE
215 [facility] Physician Orders and Discharge [address]  [address], [address]  [place], [facility]  Telephone: [phone]      Fax: [phone] home care Madiha Escobedo  Nurse Malinda Singh [phone]      Your wound-care supplies will be provided by: [employer] supplies through [employer]. Please note, depending on your insurance coverage, you may have out-of-pocket expenses for these supplies. Someone from [facility] may call you to confirm your order and discuss those potential costs before they ship your products -- please anticipate that call. If your out-of-pocket cost is going to be less than $200, and [employer] cannot reach you, they may ship your supplies as soon as the order is processed, and will send you a bill. If your out-of-pocket cost is going to be more than $200, [employer] should not ship your supplies until they speak with you. If you have any questions about your supplies or your potential out-of-pocket costs, or if you need to place an order for a refill of supplies (typically monthly), Fer Crouch is your local representative, and can be reached at [phone]. Information on [employer]'s return policy will also be enclosed with your supplies -- please read that carefully before opening your items.      NAME:  Gris Taveras   DATE of BIRTH:  1950  PRIMARY DIAGNOSIS FOR WOUND CARE CENTER:  Pressure ulcer     Wound cleansing:   Do not scrub or use excessive force. Wash hands with soap and water before and after dressing changes. Prior to applying a clean dressing, cleanse wound with normal saline, wound cleanser, or mild soap and water.  Ask your physician or nurse before getting the wound(s) wet in the shower.                Wound care for home:        LEFT MEDIAL LOWER LEG WOUND: No dressing needed this week  May moisturize dry skin with a good moisturizer of your choice.      Continue wearing your compression stockings for edema control.    Place first thing in the morning & may remove at bedtime.         Sacral Wound:   Vashe or Hypochlorous acid soaked gauze applied to wound for 2-3 minutes, no need to rinse  Triad or Zinc Oxide to maegan-wound  Collagen with silver to wound bed & tuck into depth of wound  Fluff gauze over wound  Cover with small sacral mepilex border  HHC to change dressing on Friday, Monday, Wednesday, Friday, Monday the next 2 weeks        Please note, all wounds (unless stated otherwise here) were mechanically debrided at the time of cleansing here in the wound-care center today, so a small amount of pain, drainage or bleeding from that process might be expected, and is normal.      All products for home use, including multiple products for a single wound if applicable, are medically necessary in order to achieve the best chance at timely wound healing. See provider documentation for details if needed.     Substituted dressings applied in the [address] [place] [address],[address] today, if applicable:     N/a     New orders for this week (labs, imaging, medications, etc.):     N/a       Additional instructions for specific diagnoses:     +ROHO cushion- use at [address] when sitting!!  +True Balance Air group II mattress      General comments for pressure ulcers:  *  Make sure you stay well-hydrated, and maintain good protein intake. *  Reposition at least every two hours, to keep from having pressure in one spot for too long. *  If you are not sure whether you have the best offloading surfaces (mattress, mattress overlay, chair cushion, heel-protector boots, etc), please ask. *  Moisturize your skin regularly with Vaseline, Aquaphor, Aveeno, CeraVe, Cetaphil, Eucerin, Lubriderm, etc; but keep the skin between your toes dry.   *  If you smoke, your wound can not heal properly -- please talk with us when you're ready to quit.         F/U Appointment is with Dr. Paul Dash in 2 weeks on [facility]                       .     Your nurse  is ABRAHAM Zambrano      If we applied slip-resistant hospital socks today, be sure to remove them at least once a day to inspect your toes or feet, even if you're not changing the wraps or dressings underneath.  If you see anything concerning (redness, excess moisture, etc), please call and let us know right away.     Should you experience any significant changes in your wound(s) (including redness, increased warmth, increased pain, increased drainage, odor, or fever) or have questions about your wound care, please contact the [address] [place] [address] at [phone] Monday-Thursday from 8:00 am - 4:30 pm, or Friday from 8:00 am - 2:30 pm.  If you need help with your wound outside these hours and cannot wait until we are again available, contact your home-care company (if applicable), your PCP, or go to the nearest emergency room

## 2021-11-17 NOTE — PLAN OF CARE
Leg wound remains healed, pt. Cont. To wear compression stocking for edema control. Sacral wound stable, debridement per MD & pt. Tolerated well. Will cont. With current wound care regime with dressings. Dr Sharda Lugo reinforced importance of cont. With good protein intake, off-loading & frequent repositioning from side to side to assist with wound healing, pt. Verbalizes understanding. Pt. Using ROHO cushion when sitting & advised to limit time sitting as much as possible. F/u in [address] [place] [address],[address] in 2 weeks as ordered, pt. Aware to call sooner with any changes or questions/concerns. Discharge instructions reviewed with patient, all questions answered, copy given to patient. Dressings were applied to all wounds per M.D. Instructions at this visit.

## 2021-11-22 NOTE — PROGRESS NOTES
88 [facility] Progress Note    Princess Fink     : 1950    DATE OF VISIT:  2021    Subjective:     Princess Fink is a 80 y.o. female who has a pressure ulcer located on the sacrum. Significant symptoms or pertinent wound history since last visit: feeling ok overall, not much wound pain at all, RA is more or less doing ok with Enbrel every other week, no F/C/D, stable wound drainage. Additional ulcer(s) noted? no. LLE remains healed. Her current medication list consists of Acapella, DULoxetine, Insulin Syringe-Needle U-100, Roflumilast, Teriparatide (Recombinant), albuterol sulfate HFA, aspirin EC, atorvastatin, blood glucose test strips, calcium carbonate, cetirizine, clopidogrel, cyclobenzaprine, docusate sodium, etanercept, fluticasone, gabapentin, insulin glargine, insulin lispro, ipratropium, latanoprost, metoprolol succinate, midodrine, morphine, naloxone, omeprazole, oxyCODONE-acetaminophen, predniSONE, spironolactone, torsemide, and vitamin D. Allergies: Atenolol    Objective:     Vitals:    21 0910   BP: 127/70   Pulse: 83   Resp: 16   Temp: 97.3 °F (36.3 °C)   TempSrc: Oral   Weight: 136 lb 3.2 oz (61.8 kg)   Height: 5' 9\" (1.753 m)     AAOx3, fatigued, NAD  No cellulitis, angitis, fluctuance  No acute arthritis or bursitis  No contact dermatitis or cutaneous Candidiasis  Blanca-ulcer skin: indurated, pink, warm and dry. Ulcer(s):  some good granulation over most of the visible surfaces, but still that pocket of central depth and superior undermining, but with less SQ fibronecrosis there, some fibrin and biofilm, no signs of active infection, no bone exposed; wound edges are still a bit steep overall, but I do think she's getting a bit of new epidermal tissue every week or two. Photos also saved in electronic chart.     Today's wound measurements, per RN documentation:  Wound 20 #2, Sacrum, Pressure Injury, Stage 4, Onset 2020-Wound Length (cm): 3 cm    Wound 12/18/20 #2, Sacrum, Pressure Injury, Stage 4, Onset 5/2020-Wound Width (cm): 1 cm    Wound 12/18/20 #2, Sacrum, Pressure Injury, Stage 4, Onset 5/2020-Wound Depth (cm): 1.1 cm    Assessment:     Patient Active Problem List   Diagnosis Code    Rheumatoid arthritis ([facility] M06.9    Psoriasis L40.9    GERD (gastroesophageal reflux disease) K21.9    Anemia D64.9    Cylindrical bronchiectasis ([facility]) J47.9    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    Immunocompromised state ([facility] D84.9    Coronary artery disease I25.10    Bilateral lower extremity edema R60.0    Essential hypertension I10    Lumbar spondylosis M47.816    Mitral valve insufficiency and aortic valve insufficiency I08.0    Mixed hyperlipidemia E78.2    Myopia of both eyes H52.13    Osteoporosis M81.0    Other chronic sinusitis J32.8    Primary open angle glaucoma (POAG) of both eyes, mild stage H40.1131    Primary osteoarthritis of right hip M16.11    Type 2 diabetes mellitus with unspecified diabetic retinopathy without macular edema ([facility]) E11.319    Type 2 diabetes mellitus with diabetic peripheral angiopathy without gangrene, with long-term current use of insulin ([facility]) E11.51, Z79.4    Pressure ulcer of coccygeal region, stage 4 ([facility]) L89.154    Hx of CABG Z95.1    Mild malnutrition ([facility]) E44.1    Chronic obstructive pulmonary disease ([facility]) J44.9    Nonrheumatic mitral (valve) insufficiency I34.0       Assessment of today's active condition(s): RA, Hx back surgery, poor mobility, very slowly improving stage 4 pressure ulcer of sacrum, no signs of soft tissue infection, has never had bone exposure, generally better offloaded and better nourished than months ago, and we're definitely not seeing any regression since stopping the NPWT. Factors contributing to occurrence and/or persistence of the chronic ulcer include diabetes, chronic pressure, decreased mobility, shear force and immunosuppression.  Medical necessity of today's visit is shown by the above documentation. Sharp debridement is indicated today, based upon the exam findings in the wound(s) above. Procedure note:     Consent obtained. Time out performed per [facility]. Anesthetic  Anesthetic: 4% Lidocaine Cream     Using a curette, I sharply debrided the sacrum ulcer(s) down through and including the removal of subcutaneous tissue. The type(s) of tissue debrided included fibrin, biofilm and necrotic/eschar. Total Surface Area Debrided: 3 sq cm. The ulcers were then irrigated with normal saline solution. The procedure was completed with a small amount of bleeding, and hemostasis was with pressure. The patient tolerated the procedure well, with no significant complications. The patient's level of pain during and after the procedure was monitored. Post-debridement measurements, if different from pre-debridement, are in the flowsheet as well. Discharge plan:     Treatment in the wound care center today, per RN documentation: Wound 12/18/20 #2, Sacrum, Pressure Injury, Stage 4, Onset 5/2020-Dressing/Treatment: Other (comment) (ZnO to maegan, Purachol Ag, fluffed gauze, border). Be sure to keep up with protein intake, glucose control, efforts at offloading. Keep Meseretl at [place] [address] if she can manage. Home treatment: good handwashing before and after any dressing changes. Cleanse wound with saline or soap & water before dressing change. May use Vaseline (petrolatum), Aquaphor, Aveeno, CeraVe, Cetaphil, Eucerin, Lubriderm, etc for dry skin. Dressing type for home: Hypochlorous acid spray, Periwound zinc oxide, Kathia and Dry cover dressing, three times weekly. Written discharge instructions given to patient. Follow up in 2 weeks, and if I have relatively little debridement to do that day, we might try to keep her on a QOW schedule from here forward.     Electronically signed by Madelin Foss MD on 11/22/2021 at 3:10 PM.

## 2021-12-01 ENCOUNTER — HOSPITAL ENCOUNTER (OUTPATIENT)
Dept: WOUND CARE | Age: 71
Discharge: HOME | End: 2021-12-01
Payer: MEDICARE

## 2021-12-01 VITALS
BODY MASS INDEX: 19.37 KG/M2 | HEART RATE: 98 BPM | WEIGHT: 130.8 LBS | DIASTOLIC BLOOD PRESSURE: 57 MMHG | TEMPERATURE: 97.8 F | RESPIRATION RATE: 16 BRPM | SYSTOLIC BLOOD PRESSURE: 107 MMHG | HEIGHT: 69 IN

## 2021-12-01 DIAGNOSIS — L89.154 PRESSURE ULCER OF COCCYGEAL REGION, STAGE 4 (HCC): Primary | ICD-10-CM

## 2021-12-01 PROCEDURE — 11042 DBRDMT SUBQ TIS 1ST 20SQCM/<: CPT

## 2021-12-01 PROCEDURE — 11042 DBRDMT SUBQ TIS 1ST 20SQCM/<: CPT | Performed by: INTERNAL MEDICINE

## 2021-12-01 RX ORDER — BACITRACIN ZINC AND POLYMYXIN B SULFATE 500; 1000 [USP'U]/G; [USP'U]/G
OINTMENT TOPICAL ONCE
Status: DISCONTINUED | OUTPATIENT
Start: 2021-12-01 | End: 2021-12-02 | Stop reason: HOSPADM

## 2021-12-01 RX ORDER — LIDOCAINE 40 MG/G
CREAM TOPICAL ONCE
Status: CANCELLED | OUTPATIENT
Start: 2021-12-01 | End: 2021-12-01

## 2021-12-01 RX ORDER — LIDOCAINE 40 MG/G
CREAM TOPICAL ONCE
Status: DISCONTINUED | OUTPATIENT
Start: 2021-12-01 | End: 2021-12-02 | Stop reason: HOSPADM

## 2021-12-01 RX ORDER — BACITRACIN ZINC AND POLYMYXIN B SULFATE 500; 1000 [USP'U]/G; [USP'U]/G
OINTMENT TOPICAL ONCE
Status: CANCELLED | OUTPATIENT
Start: 2021-12-01 | End: 2021-12-01

## 2021-12-01 RX ORDER — LIDOCAINE HYDROCHLORIDE 40 MG/ML
SOLUTION TOPICAL ONCE
Status: CANCELLED | OUTPATIENT
Start: 2021-12-01 | End: 2021-12-01

## 2021-12-01 RX ORDER — LIDOCAINE 50 MG/G
OINTMENT TOPICAL ONCE
Status: CANCELLED | OUTPATIENT
Start: 2021-12-01 | End: 2021-12-01

## 2021-12-01 ASSESSMENT — PAIN SCALES - GENERAL
PAINLEVEL_OUTOF10: 0
PAINLEVEL_OUTOF10: 0

## 2021-12-01 NOTE — PLAN OF CARE
215 [facility] Physician Orders and Discharge [address]  [address], [address]  [place], [facility]  Telephone: [phone]      Fax: [phone] home care Madiha Escobedo  Nurse Katy King [phone]      Your wound-care supplies will be provided by: [employer] orders supplies through [employer]. Please note, depending on your insurance coverage, you may have out-of-pocket expenses for these supplies. Someone from [facility] may call you to confirm your order and discuss those potential costs before they ship your products -- please anticipate that call. If your out-of-pocket cost is going to be less than $200, and [email or web address] cannot reach you, they may ship your supplies as soon as the order is processed, and will send you a bill. If your out-of-pocket cost is going to be more than $200, [email or web address] should not ship your supplies until they speak with you. If you have any questions about your supplies or your potential out-of-pocket costs, or if you need to place an order for a refill of supplies (typically monthly), Eliot Wynn is your local representative, and can be reached at [phone]. Information on [email or web address]'s return policy will also be enclosed with your supplies -- please read that carefully before opening your items.      NAME:  Gris Taveras   DATE of BIRTH:  1950  PRIMARY DIAGNOSIS FOR WOUND CARE CENTER:  Pressure ulcer     Wound cleansing:   Do not scrub or use excessive force. Wash hands with soap and water before and after dressing changes. Prior to applying a clean dressing, cleanse wound with normal saline, wound cleanser, or mild soap and water.  Ask your physician or nurse before getting the wound(s) wet in the shower.                Wound care for home:        LEFT MEDIAL LOWER LEG WOUND: No dressing needed this week  May moisturize dry skin with a good moisturizer of your choice.      Continue wearing your compression stockings for edema control.    Place first thing in the morning & may remove at bedtime.         Sacral Wound:   Vashe or Hypochlorous acid soaked gauze applied to wound for 2-3 minutes, no need to rinse  Triad or Zinc Oxide to maegan-wound  Collagen with silver to wound bed & tuck into depth of wound  Fluff gauze over wound  Cover with small sacral mepilex border  HHC to change dressing on Friday, Monday, Wednesday, Friday, Monday the next 2 weeks        Please note, all wounds (unless stated otherwise here) were mechanically debrided at the time of cleansing here in the wound-care center today, so a small amount of pain, drainage or bleeding from that process might be expected, and is normal.      All products for home use, including multiple products for a single wound if applicable, are medically necessary in order to achieve the best chance at timely wound healing. See provider documentation for details if needed.     Substituted dressings applied in the [facility] today, if applicable:     N/a     New orders for this week (labs, imaging, medications, etc.):     N/a       Additional instructions for specific diagnoses:     +ROHO cushion- use at [address] when sitting!!  +True Balance Air group II mattress      General comments for pressure ulcers:  *  Make sure you stay well-hydrated, and maintain good protein intake. *  Reposition at least every two hours, to keep from having pressure in one spot for too long. *  If you are not sure whether you have the best offloading surfaces (mattress, mattress overlay, chair cushion, heel-protector boots, etc), please ask. *  Moisturize your skin regularly with Vaseline, Aquaphor, Aveeno, CeraVe, Cetaphil, Eucerin, Lubriderm, etc; but keep the skin between your toes dry.   *  If you smoke, your wound can not heal properly -- please talk with us when you're ready to quit.         F/U Appointment is with Dr. Zeinab Valentino in 2 weeks on [facility]                       .     Your nurse  is ABRAHAM Zambrano      If we applied slip-resistant hospital socks today, be sure to remove them at least once a day to inspect your toes or feet, even if you're not changing the wraps or dressings underneath.  If you see anything concerning (redness, excess moisture, etc), please call and let us know right away.     Should you experience any significant changes in your wound(s) (including redness, increased warmth, increased pain, increased drainage, odor, or fever) or have questions about your wound care, please contact the [address] [place] [address] at [phone] Monday-Thursday from 8:00 am - 4:30 pm, or Friday from 8:00 am - 2:30 pm.  If you need help with your wound outside these hours and cannot wait until we are again available, contact your home-care company (if applicable), your PCP, or go to the nearest emergency room

## 2021-12-02 NOTE — PROGRESS NOTES
[facility] Progress Note    Francia Garibay     : 1950    DATE OF VISIT:  2021    Subjective:     Francia Garibay is a 80 y.o. female who has a pressure ulcer located on the sacrum. Significant symptoms or pertinent wound history since last visit: feeling well overall, little wound pain, no fever, doing her best to stay off the wound, and keep up with protein intake. Joints bothering her some days with the change in weather, but she's keeping Enbrel at just every other week. Additional ulcer(s) noted? no.      Her current medication list consists of Acapella, DULoxetine, Insulin Syringe-Needle U-100, Roflumilast, Teriparatide (Recombinant), albuterol sulfate HFA, aspirin EC, atorvastatin, blood glucose test strips, calcium carbonate, cetirizine, clopidogrel, cyclobenzaprine, docusate sodium, etanercept, fluticasone, gabapentin, insulin glargine, insulin lispro, ipratropium, latanoprost, metoprolol succinate, midodrine, morphine, naloxone, omeprazole, oxyCODONE-acetaminophen, predniSONE, spironolactone, torsemide, and vitamin D. Allergies: Atenolol    Objective:     Vitals:    21 0919   BP: (!) 107/57   Pulse: 98   Resp: 16   Temp: 97.8 °F (36.6 °C)   TempSrc: Oral   Weight: 130 lb 12.8 oz (59.3 kg)   Height: 5' 9\" (1.753 m)     AAOx3, fatigued, NAD  No cellulitis, angitis, fluctuance  No acute arthritis or bursitis  No contact dermatitis or cutaneous Candidiasis  Blanca-ulcer skin: indurated, pink, warm and dry.   Ulcer(s):  some good granulation over most of the visible surfaces, but still that pocket of central depth and superior undermining, but with less SQ fibronecrosis there, some fibrin and biofilm, no signs of active infection, no bone exposed; wound edges are still a bit steep overall, but I do think she's getting a bit of new epidermal tissue every week or two; the center looks a bit more inflamed this week perhaps, but the periphery looks great, and there are definitely a couple of short segments of slowly advancing epithelium. Photos also saved in electronic chart. Today's wound measurements, per RN documentation:  Wound 12/18/20 #2, Sacrum, Pressure Injury, Stage 4, Onset 5/2020-Wound Length (cm): 2.8 cm    Wound 12/18/20 #2, Sacrum, Pressure Injury, Stage 4, Onset 5/2020-Wound Width (cm): 1 cm    Wound 12/18/20 #2, Sacrum, Pressure Injury, Stage 4, Onset 5/2020-Wound Depth (cm): 1.1 cm    Assessment:     Patient Active Problem List   Diagnosis Code    Rheumatoid arthritis ([facility] [address] M06.9    Psoriasis L40.9    GERD (gastroesophageal reflux disease) K21.9    Anemia D64.9    Cylindrical bronchiectasis ([facility]) J47.9    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    Immunocomprom[facility] state ([facility] D84.9    Coronary artery disease I25.10    Bilateral lower extremity edema R60.0    Essential hypertension I10    Lumbar spondylosis M47.816    Mitral valve insufficiency and aortic valve insufficiency I08.0    Mixed hyperlipidemia E78.2    Myopia of both eyes H52.13    Osteoporosis M81.0    Other chronic sinusitis J32.8    Primary open angle glaucoma (POAG) of both eyes, mild stage H40.1131    Primary osteoarthritis of right hip M16.11    Type 2 diabetes mellitus with unspecified diabetic retinopathy without macular edema ([facility]) E11.319    Type 2 diabetes mellitus with diabetic peripheral angiopathy without gangrene, with long-term current use of insulin ([facility]) E11.51, Z79.4    Pressure ulcer of coccygeal region, stage 4 ([facility]) L89.154    Hx of CABG Z95.1    Mild malnutrition ([facility]) E44.1    Chronic obstructive pulmonary disease ([facility]) J44.9    Nonrheumatic mitral (valve) insufficiency I34.0       Assessment of today's active condition(s): RA, Hx back surgery, decreased mobility, stage 4 sacral pressure, very slow healing, no signs of infection - in some ways doing a bit better since NPWT stopped (at least in terms of the peripheral tissue and periwound skin).  Factors contributing to occurrence and/or persistence of the chronic ulcer include chronic pressure, decreased mobility, shear force and immunosuppression. Medical necessity of today's visit is shown by the above documentation. Sharp debridement is indicated today, based upon the exam findings in the wound(s) above. Procedure note:     Consent obtained. Time out performed per [facility]. Anesthetic  Anesthetic: 4% Lidocaine Cream     Using a curette, I sharply debrided the sacrum ulcer(s) down through and including the removal of subcutaneous tissue. The type(s) of tissue debrided included fibrin, biofilm and necrotic/eschar. Total Surface Area Debrided: 3 sq cm. The ulcers were then irrigated with normal saline solution. The procedure was completed with a small amount of bleeding, and hemostasis was with pressure. The patient tolerated the procedure well, with no significant complications. The patient's level of pain during and after the procedure was monitored. Post-debridement measurements, if different from pre-debridement, are in the flowsheet as well. Discharge plan:     Treatment in the wound care center today, per RN documentation: Wound 12/18/20 #2, Sacrum, Pressure Injury, Stage 4, Onset 5/2020-Dressing/Treatment: Other (comment) (ZnO-maegan,Purachohl, fluff gauze, mepilex border). Keep working on protein intake, glucose control, especially offloading. If she can manage, I think it's good (in terms of wound healing) that she can stay at every-other-week dosing of her Enbrel for now. Home treatment: good handwashing before and after any dressing changes. Cleanse wound with saline or soap & water before dressing change. May use Vaseline (petrolatum), Aquaphor, Aveeno, CeraVe, Cetaphil, Eucerin, Lubriderm, etc for dry skin. Dressing type for home: Hypochlorous acid spray, Periwound zinc oxide, Kathia and Dry cover dressing, three times weekly. Written discharge instructions given to patient. Follow up in 2 weeks.     Electronically signed by Zaire Suarez MD on 12/2/2021 at 2:32 PM.

## 2021-12-13 ENCOUNTER — APPOINTMENT (OUTPATIENT)
Dept: GENERAL RADIOLOGY | Age: 71
DRG: 177 | End: 2021-12-13
Payer: MEDICARE

## 2021-12-13 ENCOUNTER — HOSPITAL ENCOUNTER (INPATIENT)
Age: 71
LOS: 3 days | Discharge: HOME HEALTH | DRG: 177 | End: 2021-12-16
Attending: STUDENT IN AN ORGANIZED HEALTH CARE EDUCATION/TRAINING PROGRAM | Admitting: INTERNAL MEDICINE
Payer: MEDICARE

## 2021-12-13 ENCOUNTER — APPOINTMENT (OUTPATIENT)
Dept: CT IMAGING | Age: 71
DRG: 177 | End: 2021-12-13
Payer: MEDICARE

## 2021-12-13 DIAGNOSIS — J96.01 ACUTE RESPIRATORY FAILURE WITH HYPOXIA (HCC): Primary | ICD-10-CM

## 2021-12-13 DIAGNOSIS — U07.1 COVID-19: ICD-10-CM

## 2021-12-13 LAB
A/G RATIO: 0.8 (ref 1.1–2.2)
ALBUMIN SERPL-MCNC: 3.3 G/DL (ref 3.4–5)
ALP BLD-CCNC: 129 U/L (ref 40–129)
ALT SERPL-CCNC: 8 U/L (ref 10–40)
ANION GAP SERPL CALCULATED.3IONS-SCNC: 11 MMOL/L (ref 3–16)
AST SERPL-CCNC: 14 U/L (ref 15–37)
BASOPHILS ABSOLUTE: 0 K/UL (ref 0–0.2)
BASOPHILS RELATIVE PERCENT: 0.7 %
BILIRUB SERPL-MCNC: 0.6 MG/DL (ref 0–1)
BUN BLDV-MCNC: 18 MG/DL (ref 7–20)
CALCIUM SERPL-MCNC: 9.2 MG/DL (ref 8.3–10.6)
CHLORIDE BLD-SCNC: 94 MMOL/L (ref 99–110)
CO2: 32 MMOL/L (ref 21–32)
CREAT SERPL-MCNC: 0.9 MG/DL (ref 0.6–1.2)
D DIMER: >5250 NG/ML DDU (ref 0–229)
EKG ATRIAL RATE: 90 BPM
EKG DIAGNOSIS: NORMAL
EKG P AXIS: 67 DEGREES
EKG P-R INTERVAL: 154 MS
EKG Q-T INTERVAL: 374 MS
EKG QRS DURATION: 108 MS
EKG QTC CALCULATION (BAZETT): 457 MS
EKG R AXIS: 0 DEGREES
EKG T AXIS: 73 DEGREES
EKG VENTRICULAR RATE: 90 BPM
EOSINOPHILS ABSOLUTE: 0.1 K/UL (ref 0–0.6)
EOSINOPHILS RELATIVE PERCENT: 1.4 %
GFR AFRICAN AMERICAN: >60
GFR NON-AFRICAN AMERICAN: >60
GLUCOSE BLD-MCNC: 164 MG/DL (ref 70–99)
HCT VFR BLD CALC: 34 % (ref 36–48)
HEMOGLOBIN: 11.4 G/DL (ref 12–16)
INFLUENZA A: NOT DETECTED
INFLUENZA B: NOT DETECTED
LACTIC ACID, SEPSIS: 0.8 MMOL/L (ref 0.4–1.9)
LYMPHOCYTES ABSOLUTE: 0.5 K/UL (ref 1–5.1)
LYMPHOCYTES RELATIVE PERCENT: 12.7 %
MAGNESIUM: 1.9 MG/DL (ref 1.8–2.4)
MCH RBC QN AUTO: 31.5 PG (ref 26–34)
MCHC RBC AUTO-ENTMCNC: 33.6 G/DL (ref 31–36)
MCV RBC AUTO: 93.8 FL (ref 80–100)
MONOCYTES ABSOLUTE: 0.4 K/UL (ref 0–1.3)
MONOCYTES RELATIVE PERCENT: 9.8 %
NEUTROPHILS ABSOLUTE: 2.9 K/UL (ref 1.7–7.7)
NEUTROPHILS RELATIVE PERCENT: 75.4 %
PDW BLD-RTO: 14.8 % (ref 12.4–15.4)
PLATELET # BLD: 182 K/UL (ref 135–450)
PMV BLD AUTO: 8.1 FL (ref 5–10.5)
POTASSIUM REFLEX MAGNESIUM: 3.5 MMOL/L (ref 3.5–5.1)
PRO-BNP: 5973 PG/ML (ref 0–124)
RBC # BLD: 3.62 M/UL (ref 4–5.2)
SARS-COV-2 RNA, RT PCR: DETECTED
SODIUM BLD-SCNC: 137 MMOL/L (ref 136–145)
TOTAL PROTEIN: 7.7 G/DL (ref 6.4–8.2)
TROPONIN: 0.03 NG/ML
TROPONIN: 0.04 NG/ML
WBC # BLD: 3.8 K/UL (ref 4–11)

## 2021-12-13 PROCEDURE — 71045 X-RAY EXAM CHEST 1 VIEW: CPT

## 2021-12-13 PROCEDURE — 93005 ELECTROCARDIOGRAM TRACING: CPT | Performed by: PHYSICIAN ASSISTANT

## 2021-12-13 PROCEDURE — 83880 ASSAY OF NATRIURETIC PEPTIDE: CPT

## 2021-12-13 PROCEDURE — 71260 CT THORAX DX C+: CPT

## 2021-12-13 PROCEDURE — 1200000000 HC SEMI PRIVATE

## 2021-12-13 PROCEDURE — 87636 SARSCOV2 & INF A&B AMP PRB: CPT

## 2021-12-13 PROCEDURE — 99285 EMERGENCY DEPT VISIT HI MDM: CPT

## 2021-12-13 PROCEDURE — 83605 ASSAY OF LACTIC ACID: CPT

## 2021-12-13 PROCEDURE — 96374 THER/PROPH/DIAG INJ IV PUSH: CPT

## 2021-12-13 PROCEDURE — 85379 FIBRIN DEGRADATION QUANT: CPT

## 2021-12-13 PROCEDURE — 6360000004 HC RX CONTRAST MEDICATION: Performed by: PHYSICIAN ASSISTANT

## 2021-12-13 PROCEDURE — 85025 COMPLETE CBC W/AUTO DIFF WBC: CPT

## 2021-12-13 PROCEDURE — 96375 TX/PRO/DX INJ NEW DRUG ADDON: CPT

## 2021-12-13 PROCEDURE — 93010 ELECTROCARDIOGRAM REPORT: CPT | Performed by: INTERNAL MEDICINE

## 2021-12-13 PROCEDURE — 83735 ASSAY OF MAGNESIUM: CPT

## 2021-12-13 PROCEDURE — 80053 COMPREHEN METABOLIC PANEL: CPT

## 2021-12-13 PROCEDURE — 6360000002 HC RX W HCPCS: Performed by: PHYSICIAN ASSISTANT

## 2021-12-13 PROCEDURE — 84484 ASSAY OF TROPONIN QUANT: CPT

## 2021-12-13 RX ORDER — FUROSEMIDE 10 MG/ML
40 INJECTION INTRAMUSCULAR; INTRAVENOUS ONCE
Status: COMPLETED | OUTPATIENT
Start: 2021-12-13 | End: 2021-12-13

## 2021-12-13 RX ORDER — DEXAMETHASONE SODIUM PHOSPHATE 10 MG/ML
6 INJECTION, SOLUTION INTRAMUSCULAR; INTRAVENOUS ONCE
Status: COMPLETED | OUTPATIENT
Start: 2021-12-13 | End: 2021-12-13

## 2021-12-13 RX ADMIN — IOPAMIDOL 85 ML: 755 INJECTION, SOLUTION INTRAVENOUS at 18:05

## 2021-12-13 RX ADMIN — FUROSEMIDE 40 MG: 10 INJECTION, SOLUTION INTRAVENOUS at 14:31

## 2021-12-13 RX ADMIN — DEXAMETHASONE SODIUM PHOSPHATE 6 MG: 10 INJECTION, SOLUTION INTRAMUSCULAR; INTRAVENOUS at 18:53

## 2021-12-13 ASSESSMENT — PAIN SCALES - GENERAL: PAINLEVEL_OUTOF10: 10

## 2021-12-13 NOTE — ED PROVIDER NOTES
I independently examined and evaluated Augusto Sagastume. In brief, patient is a 43-year-old female, presenting with concerns for shortness of breath. Patient states that symptoms started overnight and she felt like she could not breathe. States that she has had a cough productive of clear sputum. Complains of increased swelling of her bilateral lower extremities as well. She does not normally use oxygen at home. She states that she feels similar to when she had a CHF exacerbation in the past. Denies any associated chest pain. Focused exam revealed patient resting comfortably, no acute distress. Heart regular rate and rhythm. Lungs with expiratory wheezing in bilateral bases. Abdomen soft, nondistended, nontender. Pitting edema bilateral lower extremities without tenderness.     Labs Reviewed   COVID-19 & INFLUENZA COMBO - Abnormal; Notable for the following components:       Result Value    SARS-CoV-2 RNA, RT PCR DETECTED (*)     All other components within normal limits    Narrative:     Performed at:  [email or web address],  [facility]   Phone [phone]   CBC WITH AUTO DIFFERENTIAL - Abnormal; Notable for the following components:    WBC 3.8 (*)     RBC 3.62 (*)     Hemoglobin 11.4 (*)     Hematocrit 34.0 (*)     Lymphocytes Absolute 0.5 (*)     All other components within normal limits    Narrative:     Performed at:  [email or web address],  [facility]   Phone [phone]   COMPREHENSIVE METABOLIC PANEL W/ REFLEX TO MG FOR LOW K - Abnormal; Notable for the following components:    Chloride 94 (*)     Glucose 164 (*)     Albumin 3.3 (*)     Albumin/Globulin Ratio 0.8 (*)     ALT 8 (*)     AST 14 (*)     All other components within normal limits    Narrative:     Performed at:  [email or web address],  [facility]   Phone [phone]   TROPONIN - EKG dated 10/29/2021    ED course: Patient is a 71-year-old female, presenting with concerns for shortness of breath. Full HPI as detailed above. Patient was seen in conjunction with JASON Grayson, please refer to her note for further details of the patient's work-up and treatment. Initially concern for CHF exacerbation, chest x-ray shows bilateral pleural effusions however CT chest shows no evidence of pulmonary embolism or acute pulmonary abnormality. She did test positive for Covid. Is requiring oxygen which is new. Patient will be hospitalized for further work-up and treatment of her condition. All diagnostic, treatment, and disposition decisions were made by myself in conjunction with the advanced practice provider. For all further details of the patient's emergency department visit, please see the advanced practice provider's documentation. 1. Acute respiratory failure with hypoxia ([address]    2. COVID-19        Comment: Please note this report has been produced using speech recognition software and may contain errors related to that system including errors in grammar, punctuation, and spelling, as well as words and phrases that may be inappropriate. If there are any questions or concerns please feel free to contact the dictating provider for clarification.        Briana Avila MD  12/13/21 [id number]

## 2021-12-13 NOTE — ED PROVIDER NOTES
exacerbation but she also notes it feels similar to when she has had pneumonia. She denies any cough this morning. She denies any recent weight gain or lower extremity swelling that is different for her. She denies any other ill symptoms this morning. Nursing Notes were all reviewed and agreed with or any disagreements were addressed  in the HPI. Pt was seen during the [place] 19 pandemic. Appropriate PPE worn by ME during patient encounters. Pt seen during a time with constrained hospital bed capacity and other potential inpatient and outpatient resources were constrained due to the viral pandemic. REVIEW OF SYSTEMS    (2-9 systems for level 4, 10 or more for level 5)     Review of Systems    Positives and Pertinent negatives as per HPI. Except as noted abovein the ROS, all other systems were reviewed and negative.        PAST MEDICAL HISTORY     Past Medical History:   Diagnosis Date    Acute on chronic diastolic heart failure due to coronary artery disease (HCC)     Acute respiratory failure with hypoxia (HCC)     Atherosclerosis of native artery of right lower extremity with rest pain ([address] 07/25/2017    Back pain     Branch retinal vein occlusion 07/20/2012    Bronchiectasis with acute exacerbation (HCC)     Cellulitis and abscess of left leg 7/11/2021    Cellulitis of left lower extremity 7/11/2021    S/P vein harvest 05/2021 for CABG    Closed compression fracture of thoracic vertebra ([address] 01/15/2020    Closed fracture of facial bone with routine healing 11/21/2016    Closed jaw fracture ([address] 01/15/2020    Community acquired pneumonia of left lower lobe of lung     Compression fracture of L1 lumbar vertebra ([address] 01/15/2020    COPD (chronic obstructive pulmonary disease) ([facility])     Fracture of tibial plateau, closed, left, initial encounter 12/05/2017    Minimally displaced zone I fracture of sacrum ([address] 09/02/2020    MRSA (methicillin resistant staph aureus) culture positive 07/2021    MRSA (methicillin resistant Staphylococcus aureus) 07/13/2021    wound    Mucus plugging of bronchi     NSTEMI (non-ST elevated myocardial infarction) ([address] 4/23/2021    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 Bone density test last year showed severe osteoporosis. Recently, two broken vertebrae (L1, L2) due to coughing. Diagnosed with tracheomalacia and stated she must cough very hard to clear phlegm. Was coughing due to upper respiratory infections which have been treated. Hx of laminectomy and recent kyphoplasty.    Refractured her jawbone which was previously repaired wi    Post herpetic neuralgia     Proximal humerus fracture 10/01/2019    Rheumatoid arthritis ([address]     Shingles 05/2020    Sleep apnea     Status post incision and drainage 07/2021    Left Leg    Surgical wound dehiscence, initial encounter (at [facility] harvest site) 7/12/2021    Temporal arteritis ([address] 07/10/2013    Tobacco use 10/19/2017    Tracheomalacia     Vitreous hemorrhage, right eye ([address] 02/21/2020         SURGICAL HISTORY     Past Surgical History:   Procedure Laterality Date    ARTERY BIOPSY Right 03/01/2021    at [address]  05/30/2013    left temporal artery biopsy    BACK SURGERY  08/2020    BRONCHOSCOPY      BRONCHOSCOPY N/A 06/12/2019    BRONCHOSCOPY ALVEOLAR LAVAGE performed by Helena Guerrier MD at [address]  05/03/2021    CATARACT REMOVAL      CORONARY ARTERY BYPASS GRAFT N/A 05/03/2021    CORONARY ARTERY BYPASS X3 WITH LEFT ATRIAL APPENDAGE CLIP, 5 LEVEL BILATERAL INTERCOSTAL NERVE BLOCK, STERNAL PLATING performed by Sree Varghese MD at [address]      IR [address] CATH  7/14/2021    IR MIDLINE CATH 7/14/2021 [id number] [address]  KNEE ARTHROSCOPY      left    KYPHOSIS SURGERY      LAMINECTOMY      LEG SURGERY Left 7/13/2021    INCISION AND DEBRIDEMENT LEFT LOWER LEG WOUND WITH POSSIBLE WOUND VAC PLACEMENT performed by Severa Norlander, MD at [address] [facility]  02/2020    MEDIASTINOSCOPY N/A 05/05/2021    MEDIASTINAL EXPLORATION AND EVACUATION OF HEMATOMA performed by Gary Up MD at [address]  01/08/2021    sacral wound debridement    PRESSURE ULCER DEBRIDEMENT N/A 01/08/2021    SACRAL WOUND DEBRIDEMENT [facility]LASTY  05/07/2013    FESS with balloon    SPINAL FUSION      TRANSESOPHAGEAL ECHOCARDIOGRAM  11/02/2021    TUBAL LIGATION      UPPER GASTROINTESTINAL ENDOSCOPY  04/08/2014    Dilitation         CURRENTMEDICATIONS       Previous Medications    ACCU-CHEK CEDRICK PLUS STRIP    TEST 4 TIMES DAILY    ALBUTEROL SULFATE  (90 BASE) MCG/ACT INHALER    INHALE 2 PUFFS INTO THE LUNGS EVERY 4 HOURS AS NEEDED FOR WHEEZING    ASPIRIN EC 81 MG EC TABLET    Take 1 tablet by mouth daily    ATORVASTATIN (LIPITOR) 40 MG TABLET    Take 40 mg by mouth    CALCIUM CARBONATE (OSCAL) 500 MG TABS TABLET    Take 500 mg by mouth daily    CETIRIZINE (ZYRTEC) 10 MG TABLET    Take 10 mg by mouth daily.       CLOPIDOGREL (PLAVIX) 75 MG TABLET    Take 1 tablet by mouth daily    CYCLOBENZAPRINE (FLEXERIL) 10 MG TABLET    Take 10 mg by mouth 2 times daily as needed     DALIRESP 500 MCG TABLET    TAKE 1 TABLET BY MOUTH DAILY    DOCUSATE SODIUM (COLACE) 100 MG CAPSULE    Take 100 mg by mouth 2 times daily as needed for Constipation    DULOXETINE (CYMBALTA) 60 MG EXTENDED RELEASE CAPSULE    Take 60 mg by mouth daily    ETANERCEPT (ENBREL) 50 MG/ML INJECTION    Inject 25 mg into the skin once Once weekly on Wednesday    FLUTICASONE (FLONASE) 50 MCG/ACT NASAL SPRAY    INHALE 2 SPRAYS IN EACH NOSTRIL DAILY    GABAPENTIN (NEURONTIN) 300 MG CAPSULE    Take 300 mg by mouth 2 times daily. INSULIN GLARGINE (LANTUS) 100 UNIT/ML INJECTION VIAL    Inject 15 Units into the skin nightly    INSULIN LISPRO (HUMALOG) 100 UNIT/ML INJECTION VIAL    Inject 0-12 Units into the skin 3 times daily (with meals)    INSULIN SYRINGE-NEEDLE U-100 31G X 5/16\" 0.5 ML MISC    USE 5 TIMES DAILY    IPRATROPIUM (ATROVENT) 0.06 % NASAL SPRAY    USE 2 SPRAYS BY NASAL ROUTE 2-4 TIMES DAILY    LATANOPROST (XALATAN) 0.005 % OPHTHALMIC SOLUTION    Place 1 drop into both eyes nightly    METOPROLOL SUCCINATE (TOPROL XL) 25 MG EXTENDED RELEASE TABLET    Take 0.5 tablets by mouth daily    MIDODRINE (PROAMATINE) 2.5 MG TABLET    Take 1 tablet by mouth 3 times daily    MISC. DEVICES (ACAPELLA) MISC    Take 1 Device by mouth as needed    MORPHINE (MS CONTIN) 15 MG EXTENDED RELEASE TABLET    15 mg 2 times daily. NARCAN 4 MG/0.1ML LIQD NASAL SPRAY    as needed     OMEPRAZOLE (PRILOSEC) 40 MG CAPSULE    Take 40 mg by mouth daily     OXYCODONE-ACETAMINOPHEN (PERCOCET)  MG PER TABLET    Take 1 tablet by mouth daily.      PREDNISONE (DELTASONE) 1 MG TABLET    Take 4 mg by mouth daily    SPIRONOLACTONE (ALDACTONE) 25 MG TABLET    Take 1 tablet by mouth daily    TERIPARATIDE, RECOMBINANT, (FORTEO) 600 MCG/2.4ML SOPN INJECTION    Inject 20 mcg into the skin daily    TORSEMIDE (DEMADEX) 20 MG TABLET    Take 2 tablets by mouth daily If weight 131 lbs or less, decrease to 20 mg daily    VITAMIN D (CHOLECALCIFEROL) 1000 UNIT TABS TABLET    Take 5,000 Units by mouth daily          ALLERGIES     Atenolol    FAMILYHISTORY       Family History   Problem Relation Age of Onset    Diabetes Mother     Hypertension Mother     Asthma Other     Heart Disease Brother     Diabetes Brother     Diabetes Sister     Heart Disease Brother     Cancer Neg Hx     Emphysema Neg Hx     Heart Failure Neg Hx           SOCIAL HISTORY       Social History     Socioeconomic History    Marital status:      Spouse name: Not on file    Number of children: Not on file    Years of education: Not on file    Highest education level: Not on file   Occupational History    Not on file   Tobacco Use    Smoking status: Former Smoker     Packs/day: 1.00     Years: 20.00     Pack years: 20.00     Types: Cigarettes     Quit date: 1991     Years since quittin.6    Smokeless tobacco: Never Used   Vaping Use    Vaping Use: Never used   Substance and Sexual Activity    Alcohol use: Not Currently     Alcohol/week: 0.0 standard drinks     Comment: rarely    Drug use: No    Sexual activity: Not on file   Other Topics Concern    Not on file   Social History Narrative    Not on file     Social Determinants of Health     Financial Resource Strain:     Difficulty of Paying Living Expenses: Not on file   Food Insecurity:     Worried About Running Out of Food in the Last Year: Not on file    Daina of Food in the Last Year: Not on file   Transportation Needs:     Lack of Transportation (Medical): Not on file    Lack of Transportation (Non-Medical):  Not on file   Physical Activity:     Days of Exercise per Week: Not on file    Minutes of Exercise per Session: Not on file   Stress:     Feeling of Stress : Not on file   Social Connections:     Frequency of Communication with Friends and Family: Not on file    Frequency of Social Gatherings with Friends and Family: Not on file    Attends Christian Services: Not on file    Active Member of Clubs or Organizations: Not on file    Attends Club or Organization Meetings: Not on file    Marital Status: Not on file   Intimate Partner Violence:     Fear of Current or Ex-Partner: Not on file    Emotionally Abused: Not on file    Physically Abused: Not on file    Sexually Abused: Not on file   Housing Stability:     Unable to Pay for Housing in the Last Year: Not on file    Number of Places Lived in the Last Year: Not on file    Unstable Housing in the Last Year: Not on file       SCREENINGS             PHYSICAL EXAM    (up to 7 for level 4, 8 or more for level 5)     ED Triage Vitals   BP Temp Temp src Pulse Resp SpO2 Height Weight   -- -- -- -- -- -- -- --       Physical Exam  PHYSICAL EXAM  There were no vitals taken for this visit. GENERAL APPEARANCE: Awake and alert. Cooperative. Chronically ill-appearing adult female lying supine in exam bed, nondiaphoretic, on 6 L nasal cannula mild acute respiratory distress. HEAD: Normocephalic. Atraumatic. EYES: PERRL. EOM's grossly intact. ENT: Mucous membranes are moist.. NECK: Supple. Trachea midline. No JVD. HEART: RRR. Faint murmur appreciated. Radial pulses 2+ symmetric, PT pulses 1+, symmetric. LUNGS: Tachypneic with intercostal retractions. Rhonchorous breath sounds throughout with faint crackles at the bases. ABDOMEN: Soft. Non-distended. Non-tender. No masses. No organomegaly. No guarding or rebound. EXTREMITIES: Lower extremity bilateral 1+ pitting edema. Moves all extremities equally. All extremities neurovascularly intact. SKIN: Warm and dry. No acute rashes. NEUROLOGICAL: Alert and oriented. CN grossly intact. No gross facial drooping. Power intact to UE and LE, sensation intact x 4. No tremors or ataxia. PSYCHIATRIC: Normal mood and affect. DIAGNOSTIC RESULTS   LABS:    Labs Reviewed - No data to display    All other labs were within normal range or not returned as of this dictation. EKG: All EKG's are interpreted by the Emergency Department Physician who either signs orCo-signs this chart in the absence of a cardiologist.  Please see their note for interpretation of EKG.       RADIOLOGY:   Non-plain film images such as CT, Ultrasound and MRI are read by the radiologist. Plain radiographic images are visualized andpreliminarily interpreted by the  ED Provider with the below findings:        Interpretation perthe Radiologist below, if available at the time of this note:    No orders to display     No results found. PROCEDURES   Unless otherwise noted below, none     Procedures    CRITICAL CARE TIME   The total critical care time spent while evaluating and treating this patient was 35 minutes. This excludes time spent doing separately billable procedures. This includes time at the bedside, data interpretation, medication management, obtaining critical history from collateral sources if the patient is unable to provide it directly, and physician consultation. Specifics of interventions taken and potentially life-threatening diagnostic considerations are listed above in the medical decision making. CONSULTS:  None      EMERGENCY DEPARTMENT COURSE and DIFFERENTIALDIAGNOSIS/MDM:   Vitals: There were no vitals filed for this visit. Patient was given thefollowing medications:  Medications - No data to display    PDMP Monitoring:    Last PDMP Walimonika Keller as Reviewed [facility]):  Review User Review Instant Review Result   Florencia Croft 8/11/2021  8:43 PM Reviewed PDMP [1]       Urine Drug Screenings (1 yr)    No resulted procedures found. Medication Contract and Consent for Opioid Use Documents Filed      No documents found                MDM:   Patient seen and evaluated. Old records reviewed. Diagnostic testing reviewed and results discussed. 69-year-old female multiple health problems presents with shortness of breath. Differentials include CHF exacerbation, pneumonia. Low concern for COPD exacerbation she is not with significant wheezing. Work-up in the emergency department remarkable for respiratory failure with hypoxia, patient doing well on 5-6 liters nasal cannula. She will require admission for her acute hypoxia as she does not typically wear oxygen at home. Concern based off of chest x-ray for CHF exacerbation, patient was given Lasix in the department.   Dimer was obtained given the acute respiratory failure and is at her baseline but significantly elevated likely secondary to medication use chronically. CTPA was obtained no sign of acute PE, patient is positive for Covid which I believe is likely contributing to her symptoms. She will require admission for respiratory failure. All information including ED workup, results, treatment, diagnosis has been reviewed and discussed with ED attending physician and directly discussed with Hospitalist who is the admitting physician. Pt will be admitted in stable condition. Pt advised of admission and is in full agreement. FINAL IMPRESSION      1. Acute respiratory failure with hypoxia (HCC)    2. COVID-19          DISPOSITION/PLAN   DISPOSITION        PATIENT REFERREDTO:  No follow-up provider specified.     DISCHARGE MEDICATIONS:  New Prescriptions    No medications on file       DISCONTINUED MEDICATIONS:  Discontinued Medications    No medications on file              (Please note that portions ofthis note were completed with a voice recognition program.  Efforts were made to edit the dictations but occasionally words are mis-transcribed.)    Brigid Cooney (electronically signed)        Brigid Cooney  12/14/21 [id number]

## 2021-12-13 NOTE — ED NOTES
Family updated pt can not have visitors due to Covid 19+, updated on lab values and pt's current status.  Pt's phone given to patient per family's request     Harpal Mccormick RN  12/13/21 [id number]

## 2021-12-14 PROBLEM — U07.1 COVID-19: Status: ACTIVE | Noted: 2021-12-14

## 2021-12-14 LAB
A/G RATIO: 0.7 (ref 1.1–2.2)
ALBUMIN SERPL-MCNC: 3.1 G/DL (ref 3.4–5)
ALP BLD-CCNC: 122 U/L (ref 40–129)
ALT SERPL-CCNC: 7 U/L (ref 10–40)
ANION GAP SERPL CALCULATED.3IONS-SCNC: 17 MMOL/L (ref 3–16)
AST SERPL-CCNC: 12 U/L (ref 15–37)
BASOPHILS ABSOLUTE: 0 K/UL (ref 0–0.2)
BASOPHILS RELATIVE PERCENT: 0.9 %
BILIRUB SERPL-MCNC: 0.9 MG/DL (ref 0–1)
BUN BLDV-MCNC: 19 MG/DL (ref 7–20)
C-REACTIVE PROTEIN: 40.9 MG/L (ref 0–5.1)
CALCIUM SERPL-MCNC: 8.3 MG/DL (ref 8.3–10.6)
CHLORIDE BLD-SCNC: 83 MMOL/L (ref 99–110)
CO2: 26 MMOL/L (ref 21–32)
CREAT SERPL-MCNC: 0.8 MG/DL (ref 0.6–1.2)
D DIMER: >5250 NG/ML DDU (ref 0–229)
EOSINOPHILS ABSOLUTE: 0 K/UL (ref 0–0.6)
EOSINOPHILS RELATIVE PERCENT: 0.1 %
GFR AFRICAN AMERICAN: >60
GFR NON-AFRICAN AMERICAN: >60
GLUCOSE BLD-MCNC: 266 MG/DL
GLUCOSE BLD-MCNC: 266 MG/DL (ref 70–99)
GLUCOSE BLD-MCNC: 282 MG/DL (ref 70–99)
GLUCOSE BLD-MCNC: 283 MG/DL (ref 70–99)
GLUCOSE BLD-MCNC: 324 MG/DL (ref 70–99)
HCT VFR BLD CALC: 34.9 % (ref 36–48)
HEMOGLOBIN: 11.5 G/DL (ref 12–16)
LYMPHOCYTES ABSOLUTE: 0.3 K/UL (ref 1–5.1)
LYMPHOCYTES RELATIVE PERCENT: 8.8 %
MAGNESIUM: 1.8 MG/DL (ref 1.8–2.4)
MCH RBC QN AUTO: 31.1 PG (ref 26–34)
MCHC RBC AUTO-ENTMCNC: 33 G/DL (ref 31–36)
MCV RBC AUTO: 94.3 FL (ref 80–100)
MONOCYTES ABSOLUTE: 0.1 K/UL (ref 0–1.3)
MONOCYTES RELATIVE PERCENT: 3.7 %
NEUTROPHILS ABSOLUTE: 3.1 K/UL (ref 1.7–7.7)
NEUTROPHILS RELATIVE PERCENT: 86.5 %
PDW BLD-RTO: 14.5 % (ref 12.4–15.4)
PERFORMED ON: ABNORMAL
PLATELET # BLD: 175 K/UL (ref 135–450)
PMV BLD AUTO: 8 FL (ref 5–10.5)
POTASSIUM REFLEX MAGNESIUM: 3 MMOL/L (ref 3.5–5.1)
RBC # BLD: 3.71 M/UL (ref 4–5.2)
SODIUM BLD-SCNC: 126 MMOL/L (ref 136–145)
TOTAL PROTEIN: 7.6 G/DL (ref 6.4–8.2)
WBC # BLD: 3.6 K/UL (ref 4–11)

## 2021-12-14 PROCEDURE — 6370000000 HC RX 637 (ALT 250 FOR IP): Performed by: INTERNAL MEDICINE

## 2021-12-14 PROCEDURE — 1200000000 HC SEMI PRIVATE

## 2021-12-14 PROCEDURE — XW033E5 INTRODUCTION OF REMDESIVIR ANTI-INFECTIVE INTO PERIPHERAL VEIN, PERCUTANEOUS APPROACH, NEW TECHNOLOGY GROUP 5: ICD-10-PCS | Performed by: INTERNAL MEDICINE

## 2021-12-14 PROCEDURE — 99225 PR SBSQ OBSERVATION CARE/DAY 25 MINUTES: CPT | Performed by: PHYSICIAN ASSISTANT

## 2021-12-14 PROCEDURE — 80053 COMPREHEN METABOLIC PANEL: CPT

## 2021-12-14 PROCEDURE — 2500000003 HC RX 250 WO HCPCS: Performed by: INTERNAL MEDICINE

## 2021-12-14 PROCEDURE — 86140 C-REACTIVE PROTEIN: CPT

## 2021-12-14 PROCEDURE — 83735 ASSAY OF MAGNESIUM: CPT

## 2021-12-14 PROCEDURE — 6370000000 HC RX 637 (ALT 250 FOR IP): Performed by: PHYSICIAN ASSISTANT

## 2021-12-14 PROCEDURE — 6360000002 HC RX W HCPCS: Performed by: INTERNAL MEDICINE

## 2021-12-14 PROCEDURE — 94640 AIRWAY INHALATION TREATMENT: CPT

## 2021-12-14 PROCEDURE — 85379 FIBRIN DEGRADATION QUANT: CPT

## 2021-12-14 PROCEDURE — 2580000003 HC RX 258: Performed by: INTERNAL MEDICINE

## 2021-12-14 PROCEDURE — 94761 N-INVAS EAR/PLS OXIMETRY MLT: CPT

## 2021-12-14 PROCEDURE — 85025 COMPLETE CBC W/AUTO DIFF WBC: CPT

## 2021-12-14 PROCEDURE — 99223 1ST HOSP IP/OBS HIGH 75: CPT | Performed by: INTERNAL MEDICINE

## 2021-12-14 RX ORDER — ONDANSETRON 4 MG/1
4 TABLET, ORALLY DISINTEGRATING ORAL EVERY 8 HOURS PRN
Status: DISCONTINUED | OUTPATIENT
Start: 2021-12-14 | End: 2021-12-16 | Stop reason: HOSPADM

## 2021-12-14 RX ORDER — ACETAMINOPHEN 325 MG/1
650 TABLET ORAL EVERY 6 HOURS PRN
Status: DISCONTINUED | OUTPATIENT
Start: 2021-12-14 | End: 2021-12-16 | Stop reason: HOSPADM

## 2021-12-14 RX ORDER — SPIRONOLACTONE 25 MG/1
25 TABLET ORAL DAILY
Status: DISCONTINUED | OUTPATIENT
Start: 2021-12-15 | End: 2021-12-16 | Stop reason: HOSPADM

## 2021-12-14 RX ORDER — TORSEMIDE 20 MG/1
40 TABLET ORAL DAILY
Status: DISCONTINUED | OUTPATIENT
Start: 2021-12-15 | End: 2021-12-16 | Stop reason: HOSPADM

## 2021-12-14 RX ORDER — OXYCODONE AND ACETAMINOPHEN 10; 325 MG/1; MG/1
1 TABLET ORAL
Status: DISCONTINUED | OUTPATIENT
Start: 2021-12-14 | End: 2021-12-16 | Stop reason: HOSPADM

## 2021-12-14 RX ORDER — FLUTICASONE PROPIONATE 50 MCG
1 SPRAY, SUSPENSION (ML) NASAL DAILY
Status: DISCONTINUED | OUTPATIENT
Start: 2021-12-14 | End: 2021-12-16 | Stop reason: HOSPADM

## 2021-12-14 RX ORDER — SODIUM CHLORIDE 9 MG/ML
25 INJECTION, SOLUTION INTRAVENOUS PRN
Status: DISCONTINUED | OUTPATIENT
Start: 2021-12-14 | End: 2021-12-16 | Stop reason: HOSPADM

## 2021-12-14 RX ORDER — FUROSEMIDE 10 MG/ML
40 INJECTION INTRAMUSCULAR; INTRAVENOUS 2 TIMES DAILY
Status: DISCONTINUED | OUTPATIENT
Start: 2021-12-14 | End: 2021-12-14

## 2021-12-14 RX ORDER — SODIUM CHLORIDE 0.9 % (FLUSH) 0.9 %
5-40 SYRINGE (ML) INJECTION PRN
Status: DISCONTINUED | OUTPATIENT
Start: 2021-12-14 | End: 2021-12-16 | Stop reason: HOSPADM

## 2021-12-14 RX ORDER — CYCLOBENZAPRINE HCL 10 MG
10 TABLET ORAL 2 TIMES DAILY PRN
Status: DISCONTINUED | OUTPATIENT
Start: 2021-12-14 | End: 2021-12-16 | Stop reason: HOSPADM

## 2021-12-14 RX ORDER — SODIUM CHLORIDE 0.9 % (FLUSH) 0.9 %
5-40 SYRINGE (ML) INJECTION EVERY 12 HOURS SCHEDULED
Status: DISCONTINUED | OUTPATIENT
Start: 2021-12-14 | End: 2021-12-16 | Stop reason: HOSPADM

## 2021-12-14 RX ORDER — CLOPIDOGREL BISULFATE 75 MG/1
75 TABLET ORAL DAILY
Status: DISCONTINUED | OUTPATIENT
Start: 2021-12-14 | End: 2021-12-16 | Stop reason: HOSPADM

## 2021-12-14 RX ORDER — DULOXETIN HYDROCHLORIDE 60 MG/1
60 CAPSULE, DELAYED RELEASE ORAL DAILY
Status: DISCONTINUED | OUTPATIENT
Start: 2021-12-14 | End: 2021-12-16 | Stop reason: HOSPADM

## 2021-12-14 RX ORDER — GUAIFENESIN/DEXTROMETHORPHAN 100-10MG/5
5 SYRUP ORAL EVERY 4 HOURS PRN
Status: DISCONTINUED | OUTPATIENT
Start: 2021-12-14 | End: 2021-12-16 | Stop reason: HOSPADM

## 2021-12-14 RX ORDER — DEXTROSE MONOHYDRATE 25 G/50ML
12.5 INJECTION, SOLUTION INTRAVENOUS PRN
Status: DISCONTINUED | OUTPATIENT
Start: 2021-12-14 | End: 2021-12-16 | Stop reason: HOSPADM

## 2021-12-14 RX ORDER — ASPIRIN 81 MG/1
81 TABLET ORAL DAILY
Status: DISCONTINUED | OUTPATIENT
Start: 2021-12-14 | End: 2021-12-16 | Stop reason: HOSPADM

## 2021-12-14 RX ORDER — DEXAMETHASONE SODIUM PHOSPHATE 10 MG/ML
6 INJECTION, SOLUTION INTRAMUSCULAR; INTRAVENOUS EVERY 24 HOURS
Status: DISCONTINUED | OUTPATIENT
Start: 2021-12-14 | End: 2021-12-16 | Stop reason: HOSPADM

## 2021-12-14 RX ORDER — POTASSIUM CHLORIDE 20 MEQ/1
40 TABLET, EXTENDED RELEASE ORAL ONCE
Status: COMPLETED | OUTPATIENT
Start: 2021-12-14 | End: 2021-12-14

## 2021-12-14 RX ORDER — DEXTROSE MONOHYDRATE 50 MG/ML
100 INJECTION, SOLUTION INTRAVENOUS PRN
Status: DISCONTINUED | OUTPATIENT
Start: 2021-12-14 | End: 2021-12-16 | Stop reason: HOSPADM

## 2021-12-14 RX ORDER — GABAPENTIN 300 MG/1
300 CAPSULE ORAL 2 TIMES DAILY
Status: DISCONTINUED | OUTPATIENT
Start: 2021-12-14 | End: 2021-12-16 | Stop reason: HOSPADM

## 2021-12-14 RX ORDER — ALBUTEROL SULFATE 90 UG/1
2 AEROSOL, METERED RESPIRATORY (INHALATION) EVERY 4 HOURS PRN
Status: DISCONTINUED | OUTPATIENT
Start: 2021-12-14 | End: 2021-12-16 | Stop reason: HOSPADM

## 2021-12-14 RX ORDER — MIDODRINE HYDROCHLORIDE 5 MG/1
2.5 TABLET ORAL 3 TIMES DAILY
Status: DISCONTINUED | OUTPATIENT
Start: 2021-12-14 | End: 2021-12-16 | Stop reason: HOSPADM

## 2021-12-14 RX ORDER — POLYETHYLENE GLYCOL 3350 17 G/17G
17 POWDER, FOR SOLUTION ORAL DAILY PRN
Status: DISCONTINUED | OUTPATIENT
Start: 2021-12-14 | End: 2021-12-16 | Stop reason: HOSPADM

## 2021-12-14 RX ORDER — INSULIN GLARGINE 100 [IU]/ML
15 INJECTION, SOLUTION SUBCUTANEOUS NIGHTLY
Status: DISCONTINUED | OUTPATIENT
Start: 2021-12-14 | End: 2021-12-16 | Stop reason: HOSPADM

## 2021-12-14 RX ORDER — NICOTINE POLACRILEX 4 MG
15 LOZENGE BUCCAL PRN
Status: DISCONTINUED | OUTPATIENT
Start: 2021-12-14 | End: 2021-12-16 | Stop reason: HOSPADM

## 2021-12-14 RX ORDER — ATORVASTATIN CALCIUM 40 MG/1
40 TABLET, FILM COATED ORAL NIGHTLY
Status: DISCONTINUED | OUTPATIENT
Start: 2021-12-14 | End: 2021-12-16 | Stop reason: HOSPADM

## 2021-12-14 RX ORDER — POTASSIUM CHLORIDE 20 MEQ/1
40 TABLET, EXTENDED RELEASE ORAL
Status: COMPLETED | OUTPATIENT
Start: 2021-12-14 | End: 2021-12-14

## 2021-12-14 RX ORDER — ACETAMINOPHEN 650 MG/1
650 SUPPOSITORY RECTAL EVERY 6 HOURS PRN
Status: DISCONTINUED | OUTPATIENT
Start: 2021-12-14 | End: 2021-12-16 | Stop reason: HOSPADM

## 2021-12-14 RX ORDER — MORPHINE SULFATE 15 MG/1
15 TABLET, FILM COATED, EXTENDED RELEASE ORAL EVERY 12 HOURS SCHEDULED
Status: DISCONTINUED | OUTPATIENT
Start: 2021-12-14 | End: 2021-12-16 | Stop reason: HOSPADM

## 2021-12-14 RX ORDER — METOPROLOL SUCCINATE 25 MG/1
12.5 TABLET, EXTENDED RELEASE ORAL DAILY
Status: DISCONTINUED | OUTPATIENT
Start: 2021-12-14 | End: 2021-12-16 | Stop reason: HOSPADM

## 2021-12-14 RX ORDER — ONDANSETRON 2 MG/ML
4 INJECTION INTRAMUSCULAR; INTRAVENOUS EVERY 6 HOURS PRN
Status: DISCONTINUED | OUTPATIENT
Start: 2021-12-14 | End: 2021-12-16 | Stop reason: HOSPADM

## 2021-12-14 RX ADMIN — CLOPIDOGREL BISULFATE 75 MG: 75 TABLET ORAL at 10:09

## 2021-12-14 RX ADMIN — INSULIN LISPRO 8 UNITS: 100 INJECTION, SOLUTION INTRAVENOUS; SUBCUTANEOUS at 16:27

## 2021-12-14 RX ADMIN — FUROSEMIDE 40 MG: 10 INJECTION, SOLUTION INTRAVENOUS at 10:09

## 2021-12-14 RX ADMIN — GABAPENTIN 300 MG: 300 CAPSULE ORAL at 20:49

## 2021-12-14 RX ADMIN — METOPROLOL SUCCINATE 12.5 MG: 25 TABLET, EXTENDED RELEASE ORAL at 10:09

## 2021-12-14 RX ADMIN — POTASSIUM CHLORIDE 40 MEQ: 1500 TABLET, EXTENDED RELEASE ORAL at 16:24

## 2021-12-14 RX ADMIN — Medication 10 ML: at 10:11

## 2021-12-14 RX ADMIN — MIDODRINE HYDROCHLORIDE 2.5 MG: 5 TABLET ORAL at 20:48

## 2021-12-14 RX ADMIN — IPRATROPIUM BROMIDE AND ALBUTEROL 1 PUFF: 20; 100 SPRAY, METERED RESPIRATORY (INHALATION) at 19:44

## 2021-12-14 RX ADMIN — FLUTICASONE PROPIONATE 1 SPRAY: 50 SPRAY, METERED NASAL at 10:06

## 2021-12-14 RX ADMIN — MIDODRINE HYDROCHLORIDE 2.5 MG: 5 TABLET ORAL at 14:25

## 2021-12-14 RX ADMIN — GABAPENTIN 300 MG: 300 CAPSULE ORAL at 10:08

## 2021-12-14 RX ADMIN — OXYCODONE AND ACETAMINOPHEN 1 TABLET: 10; 325 TABLET ORAL at 16:24

## 2021-12-14 RX ADMIN — MIDODRINE HYDROCHLORIDE 2.5 MG: 5 TABLET ORAL at 10:07

## 2021-12-14 RX ADMIN — INSULIN GLARGINE 15 UNITS: 100 INJECTION, SOLUTION SUBCUTANEOUS at 20:50

## 2021-12-14 RX ADMIN — ENOXAPARIN SODIUM 30 MG: 100 INJECTION SUBCUTANEOUS at 10:06

## 2021-12-14 RX ADMIN — ASPIRIN 81 MG: 81 TABLET, COATED ORAL at 10:08

## 2021-12-14 RX ADMIN — MORPHINE SULFATE 15 MG: 15 TABLET, EXTENDED RELEASE ORAL at 20:48

## 2021-12-14 RX ADMIN — REMDESIVIR 200 MG: 100 INJECTION, POWDER, LYOPHILIZED, FOR SOLUTION INTRAVENOUS at 10:17

## 2021-12-14 RX ADMIN — DULOXETINE HYDROCHLORIDE 60 MG: 60 CAPSULE, DELAYED RELEASE ORAL at 10:09

## 2021-12-14 RX ADMIN — DEXAMETHASONE SODIUM PHOSPHATE 6 MG: 10 INJECTION INTRAMUSCULAR; INTRAVENOUS at 09:02

## 2021-12-14 RX ADMIN — ATORVASTATIN CALCIUM 40 MG: 40 TABLET, FILM COATED ORAL at 20:48

## 2021-12-14 RX ADMIN — IPRATROPIUM BROMIDE AND ALBUTEROL 1 PUFF: 20; 100 SPRAY, METERED RESPIRATORY (INHALATION) at 15:43

## 2021-12-14 RX ADMIN — MORPHINE SULFATE 15 MG: 15 TABLET, EXTENDED RELEASE ORAL at 10:07

## 2021-12-14 RX ADMIN — ENOXAPARIN SODIUM 30 MG: 100 INJECTION SUBCUTANEOUS at 20:51

## 2021-12-14 RX ADMIN — POTASSIUM CHLORIDE 40 MEQ: 1500 TABLET, EXTENDED RELEASE ORAL at 19:04

## 2021-12-14 ASSESSMENT — PAIN SCALES - GENERAL
PAINLEVEL_OUTOF10: 10
PAINLEVEL_OUTOF10: 0
PAINLEVEL_OUTOF10: 8
PAINLEVEL_OUTOF10: 0
PAINLEVEL_OUTOF10: 8

## 2021-12-14 ASSESSMENT — PAIN DESCRIPTION - PAIN TYPE: TYPE: CHRONIC PAIN

## 2021-12-14 NOTE — PROGRESS NOTES
Patient admitted to room 209 from Patient oriented to room, call light, bed rails, phone, lights and bathroom. Patient instructed about the schedule of the day including: vital sign frequency, lab draws, possible tests, frequency of MD and staff rounds, daily weights, I &O's and prescribed diet. Bed alarm on Telemetry box in place, patient aware of placement and reason. Bed locked, in lowest position, side rails up 2/4, call light within reach. Recliner Assessment:     Patient is able to demonstrate the ability to move from a reclining position to an upright position within the recliner. 4 Eyes Skin Assessment     The patient is being assess for   Admission    I agree that 2 RN's have performed a thorough Head to Toe Skin Assessment on the patient. ALL assessment sites listed below have been assessed. Areas assessed for pressure by both nurses:   [x]   Head, Face, and Ears   [x]   Shoulders, Back, and Chest, Abdomen  [x]   Arms, Elbows, and Hands   [x]   Coccyx, Sacrum, and Ischium  [x]   Legs, Feet, and Heels                All Mepilex Borders were peeled back and area peeked at by both nurses:  Yes  Please list where Mepilex Borders are located:  sacrum,  Wound is packed with what appears to be opticell              **SHARE this note so that the co-signing nurse is able to place an eSignature**    Co-signer eSignature: Electronically signed by Parish Russell RN on 12/14/21 at 5:06 PM EST    Does the Patient have Skin Breakdown related to pressure?   Yes LDA WOUND CARE was Initiated documentation include the Blanca-wound, Wound Assessment, Measurements, Dressing Treatment, Drainage, and Color\",       Sven Prevention initiated:  Yes   Wound Care Orders initiated:  Yes      [id number]  nurse consulted for Pressure Injury (Stage 3,4, Unstageable, DTI, NWPT, Complex wounds)and New or Established Ostomies:  Yes      Primary Nurse eSignature: Electronically signed by Po Ozuna RN on 12/14/21 at 2:30 PM EST      Bedside Mobility Assessment Tool (BMAT):     Assessment Level 1- Sit and Shake    1. From a semi-reclined position, ask patient to sit up and rotate to a seated position at the side of the bed. Can use the bedrail. 2. Ask patient to reach out and grab your hand and shake making sure patient reaches across his/her midline. Pass- Patient is able to come to a seated position, maintain core strength. Maintains seated balance while reaching across midline. Move on to Assessment Level 2. Assessment Level 2- Stretch and Point   1. With patient in seated position at the side of the bed, have patient place both feet on the floor (or stool) with knees no higher than hips. 2. Ask patient to stretch one leg and straighten the knee, then bend the ankle/flex and point the toes. If appropriate, repeat with the other leg. Pass- Patient is able to demonstrate appropriate quad strength on intended weight bearing limb(s). Move onto Assessment Level 3. Assessment Level 3- Stand   1. Ask patient to elevate off the bed or chair (seated to standing) using an assistive device (cane, bedrail). 2. Patient should be able to raise buttocks off be and hold for a count of five. May repeat once. Fail- Patient unable to demonstrate standing stability. Patient is MOBILITY LEVEL 3. Assessment Level 4- Walk   1. Ask patient to march in place at bedside. 2. Then ask patient to advance step and return each foot. Some medical conditions may render a patient from stepping backwards, use your best clinical judgement. Fail- Patient not able to complete tasks OR requires use of assistive device. Patient is MOBILITY LEVEL 3.        Mobility Level- 2

## 2021-12-14 NOTE — PROGRESS NOTES
Patient shift assessment complete    VSS at this time, Blood pressure (!) 134/50, pulse 95, temperature 98.3 °F (36.8 °C), temperature source Oral, resp. rate 24, height 5' 9\" (1.753 m), weight 135 lb (61.2 kg), SpO2 98 %. NSR with PVC on monitor    Lung sounds diminished but clear, dry infrequent cough present. Bowel sounds active in all quadrants and regular diet implemented. Annelise Roses in place at this time. Peripheral IV dressing is clean, dry and intact with no signs of drainage. All tubing is current and dated. IPA caps applied to all access hubs. No infusions at this time. Patient denied pain or discomfort at this time. No additonal concerns. In bed eyes closed and resting.      Electronically signed by Ángela Pope RN on 12/14/2021 at 1:47 AM

## 2021-12-14 NOTE — H&P
bone with routine healing 11/21/2016    Closed jaw fracture ([address] 01/15/2020    Community acquired pneumonia of left lower lobe of lung     Compression fracture of L1 lumbar vertebra ([address] 01/15/2020    COPD (chronic obstructive pulmonary disease) (HCC)     Fracture of tibial plateau, closed, left, initial encounter 12/05/2017    Minimally displaced zone I fracture of sacrum (HCC) 09/02/2020    MRSA (methicillin resistant staph aureus) culture positive 07/2021    MRSA (methicillin resistant Staphylococcus aureus) 07/13/2021    wound    Mucus plugging of bronchi     NSTEMI (non-ST elevated myocardial infarction) ([address] 4/23/2021    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 Bone density test last year showed severe osteoporosis. Recently, two broken vertebrae (L1, L2) due to coughing. Diagnosed with tracheomalacia and stated she must cough very hard to clear phlegm. Was coughing due to upper respiratory infections which have been treated. Hx of laminectomy and recent kyphoplasty.    Refractured her jawbone which was previously repaired wi    Post herpetic neuralgia     Proximal humerus fracture 10/01/2019    Rheumatoid arthritis ([address]     Shingles 05/2020    Sleep apnea     Status post incision and drainage 07/2021    Left Leg    Surgical wound dehiscence, initial encounter (at [facility]) 7/12/2021    Temporal arteritis ([address] 07/10/2013    Tobacco use 10/19/2017    Tracheomalacia     Vitreous hemorrhage, right eye ([address] 02/21/2020       Past Surgical History:          Procedure Laterality Date    ARTERY BIOPSY Right 03/01/2021    at [address]  05/30/2013    left temporal artery biopsy    BACK SURGERY  08/2020    BRONCHOSCOPY      BRONCHOSCOPY N/A 06/12/2019    BRONCHOSCOPY ALVEOLAR LAVAGE performed by Imani Hernández Geraldo Burnett MD at [address]  05/03/2021    CATARACT REMOVAL      CORONARY ARTERY BYPASS GRAFT N/A 05/03/2021    CORONARY ARTERY BYPASS X3 WITH LEFT ATRIAL APPENDAGE CLIP, 5 LEVEL BILATERAL INTERCOSTAL NERVE BLOCK, STERNAL PLATING performed by John Cruz MD at [address]      IR MIDLINE CATH  7/14/2021    IR MIDLINE CATH 7/14/2021 [facility] SPECIAL PROCEDURES    KNEE ARTHROSCOPY      left    KYPHOSIS SURGERY      LAMINECTOMY      LEG SURGERY Left 7/13/2021    INCISION AND DEBRIDEMENT LEFT LOWER LEG WOUND WITH POSSIBLE WOUND VAC PLACEMENT performed by Candice King MD at [address] [facility]  02/2020    MEDIASTINOSCOPY N/A 05/05/2021    MEDIASTINAL EXPLORATION AND EVACUATION OF HEMATOMA performed by John Cruz MD at [address] [place] [address]  01/08/2021    sacral wound debridement    PRESSURE ULCER DEBRIDEMENT N/A 01/08/2021    SACRAL WOUND DEBRIDEMENT performed by Itzel Lozada MD at [facility]  05/07/2013    FESS with balloon    SPINAL FUSION      TRANSESOPHAGEAL ECHOCARDIOGRAM  11/02/2021    TUBAL LIGATION      UPPER GASTROINTESTINAL ENDOSCOPY  04/08/2014    Dilitation       Medications Prior to Admission:      Prior to Admission medications    Medication Sig Start Date End Date Taking?  Authorizing Provider   etanercept (ENBREL) 50 MG/ML injection Inject 25 mg into the skin once Once weekly on Wednesday    Historical Provider, MD   torsemide (DEMADEX) 20 MG tablet Take 2 tablets by mouth daily If weight 131 lbs or less, decrease to 20 mg daily 9/17/21   Bryce Graham, APRN - CNP   metoprolol succinate (TOPROL XL) 25 MG extended release tablet Take 0.5 tablets by mouth daily 9/17/21   Bryce Graham, APRN - CNP   spironolactone (ALDACTONE) 25 MG tablet Take 1 tablet by mouth daily 9/16/21   Bryce Graham, APRN - CNP   midodrine (PROAMATINE) 2.5 MG tablet Take 1 tablet by mouth 3 times daily 9/16/21   Eddie Cornell, APRN - CNP   DULoxetine (CYMBALTA) 60 MG extended release capsule Take 60 mg by mouth daily    Historical Provider, MD   DALIRESP 500 MCG tablet TAKE 1 TABLET BY MOUTH DAILY 6/23/21   Roel Martin MD   clopidogrel (PLAVIX) 75 MG tablet Take 1 tablet by mouth daily 6/10/21   Booker Grayson MD   predniSONE (DELTASONE) 1 MG tablet Take 4 mg by mouth daily    Historical Provider, MD   insulin glargine (LANTUS) 100 UNIT/ML injection vial Inject 15 Units into the skin nightly 5/10/21   Kimani Vu APRN - CNP   oxyCODONE-acetaminophen (PERCOCET)  MG per tablet Take 1 tablet by mouth daily. Historical Provider, MD   docusate sodium (COLACE) 100 MG capsule Take 100 mg by mouth 2 times daily as needed for Constipation    Historical Provider, MD   gabapentin (NEURONTIN) 300 MG capsule Take 300 mg by mouth 2 times daily. Historical Provider, MD   latanoprost (XALATAN) 0.005 % ophthalmic solution Place 1 drop into both eyes nightly    Historical Provider, MD   Teriparatide, Recombinant, (FORTEO) 600 MCG/2.4ML SOPN injection Inject 20 mcg into the skin daily    Historical Provider, MD   NARCAN 4 MG/0.1ML LIQD nasal spray as needed  10/20/20   Historical Provider, MD   morphine (MS CONTIN) 15 MG extended release tablet 15 mg 2 times daily.   10/16/20   Historical Provider, MD   ipratropium (ATROVENT) 0.06 % nasal spray USE 2 SPRAYS BY NASAL ROUTE 2-4 TIMES DAILY 10/29/20   Roel Martin MD   albuterol sulfate  (90 Base) MCG/ACT inhaler INHALE 2 PUFFS INTO THE LUNGS EVERY 4 HOURS AS NEEDED FOR WHEEZING 1/20/20   Kirsten Martinez MD   vitamin D (CHOLECALCIFEROL) 1000 UNIT TABS tablet Take 5,000 Units by mouth daily     Historical Provider, MD   calcium carbonate (OSCAL) 500 MG TABS tablet Take 500 mg by mouth daily    Historical Provider, MD   atorvastatin (LIPITOR) 40 MG tablet Take 40 mg by mouth 10/16/18   Historical Provider, MD cyclobenzaprine (FLEXERIL) 10 MG tablet Take 10 mg by mouth 2 times daily as needed     Historical Provider, MD   Insulin Syringe-Needle U-100 31G X 5/16\" 0.5 ML MISC USE 5 TIMES DAILY 5/30/18   Historical Provider, MD   [address] [facility] strip TEST 4 TIMES DAILY 6/1/18   Historical Provider, MD   aspirin EC 81 MG EC tablet Take 1 tablet by mouth daily 10/23/17   Yony Angeles MD   insulin lispro (HUMALOG) 100 UNIT/ML injection vial Inject 0-12 Units into the skin 3 times daily (with meals) 10/23/17   Yony Angeles MD   Misc. Devices (ACAPELLA) MISC Take 1 Device by mouth as needed 9/2/15   LORENZO Tejeda CNP   fluticasone Milagros [place]) 50 MCG/ACT nasal spray INHALE 2 SPRAYS IN [facility] NOSTRIL DAILY 11/25/13   Cassi James MD   cetirizine (ZYRTEC) 10 MG tablet Take 10 mg by mouth daily. Historical Provider, MD   omeprazole (PRILOSEC) 40 MG capsule Take 40 mg by mouth daily     Historical Provider, MD       Allergies:  Atenolol    Social History:      TOBACCO:   reports that she quit smoking about 30 years ago. Her smoking use included cigarettes. She has a 20.00 pack-year smoking history. She has never used smokeless tobacco.  ETOH:   reports previous alcohol use. Family History:          Problem Relation Age of Onset    Diabetes Mother     Hypertension Mother     Asthma Other     Heart Disease Brother     Diabetes Brother     Diabetes Sister     Heart Disease Brother     Cancer Neg Hx     Emphysema Neg Hx     Heart Failure Neg Hx        REVIEW OF SYSTEMS:   Constitutional:  Negative for fever,chills or night sweats  ENT:  Negative for rhinorrhea, epistaxis, hoarseness, sore throat.   Respiratory: Positive for shortness of breath  Cardiovascular:   Negative for  chest pain, palpitations   Gastrointestinal:  Negative for nausea, vomiting, diarrhea  Genitourinary:  Negative for polyuria, dysuria   Hematologic/Lymphatic:  Negative for  bleeding tendency, easy bruising  Musculoskeletal: Negative for myalgias and arthralgias  Neurologic:  Negative for  confusion,dysarthria. Skin:   Chronic sacral decubitus ulcer  Psychiatric:  Negative for depression,anxiety, agitation. Endocrine:  Negative for polydipsia,polyuria,heat /cold intolerance. PHYSICAL EXAM:    BP (!) 135/59   Pulse 96   Temp 98.1 °F (36.7 °C) (Oral)   Resp 20   Ht 5' 9\" (1.753 m)   Wt 135 lb (61.2 kg)   SpO2 99%   BMI 19.94 kg/m²     General appearance:  No apparent distress, appears stated age and cooperative. HEENT:  Normal cephalic, atraumatic without obvious deformity. Pupils equal, round, and reactive to light. Extra ocular muscles intact. Conjunctivae/corneas clear. Neck: Supple, with full range of motion. No jugular venous distention. Trachea midline. Respiratory: Diminished breath sounds bilateral  Cardiovascular:  Regular rate and rhythm with normal S1/S2 without murmurs, rubs or gallops. Abdomen: Soft, non-tender, non-distended with normal bowel sounds. Musculoskeletal: 2+ pitting edema in bilateral lower extremities  Skin: Did not examine sacrum  Neurologic:  Neurovascularly intact without any focal sensory/motor deficits. Cranial nerves: II-XII intact, grossly non-focal.  Psychiatric:  Alert and oriented, thought content appropriate, normal insight  Capillary Refill: Brisk,< 3 seconds   Peripheral Pulses: +2 palpable, equal bilaterally       Labs:   Recent Labs     12/13/21  1410   WBC 3.8*   HGB 11.4*   HCT 34.0*        Recent Labs     12/13/21  1410      K 3.5   CL 94*   CO2 32   BUN 18   CREATININE 0.9   CALCIUM 9.2     Recent Labs     12/13/21  1410   AST 14*   ALT 8*   BILITOT 0.6   ALKPHOS 129     No results for input(s): INR in the last 72 hours.   Recent Labs     12/13/21  1410 12/13/21  1704   TROPONINI 0.04* 0.03*       Urinalysis:      Lab Results   Component Value Date    NITRU Negative 04/26/2021    WBCUA 0-2 04/26/2021    BACTERIA 1+ 04/23/2021    RBCUA 3-4 04/26/2021    BLOODU TRACE-INTACT 04/26/2021    SPECGRAV 1.010 04/26/2021    GLUCOSEU Negative 04/26/2021    GLUCOSEU NEGATIVE 03/20/2010       Radiology:   CT CHEST PULMONARY EMBOLISM W CONTRAST   Final Result   No evidence of pulmonary embolism or acute pulmonary abnormality. Scarring at the right lung base         XR CHEST PORTABLE   Final Result   Bilateral pleural effusions. ASSESSMENT:  Acute respiratory failure hypoxia  COVID-19 pneumonia, fully vaccinated with Pfizer  Acute on chronic diastolic heart failure  Chest pain  Bronchiectasis  Sacral decubitus ulcer POA  Coronary artery disease status post CABG  Osteoporosis    PLAN:  Fully vaccinated patient who presented to the emergency department were respiratory failure was found to have COVID-19 pneumonia. Her symptoms of onset is so acute that makes me think her shortness of breath may be multifactorial in the setting of volume overload as well. Because of this we will continue IV Lasix twice daily and consult cardiology and pulmonary.    -Lasix IV  -Remdesivir and Decadron day #1  -Supplemental oxygen therapy  -Continue chronic home medications including pain meds    DVT Prophylaxis: Lovenox  Diet: No diet orders on file  Code Status: Prior    Dispo -admit to [facility] on telemetry      Thank you for the opportunity to be involved in this patient's care.       (Please note that portions of this note were completed with a voice recognition program. Efforts were made to edit the dictations but occasionally words are mis-transcribed.)

## 2021-12-14 NOTE — ACP (ADVANCE CARE PLANNING)
Advance Care Planning   Healthcare Decision Maker:    Primary Decision Maker: Chandler Kaden Child - [phone]    Secondary Decision Maker: Magali Lockizzle - Child - [phone]    Click here to complete Healthcare Decision Makers including selection of the Healthcare Decision Maker Relationship (ie \"Primary\"). Today we documented Decision Maker(s) consistent with ACP documents on file.

## 2021-12-14 NOTE — PROGRESS NOTES
Progress Note    Admit Date:  12/13/2021    71F with COPD, CHF presents with 1 day history of dyspnea. Found to be COVID 19 positive, but also with findings of acute CHF. Initially required 4L NC O2 (no O2 at baseline) but significantly improved after diuresis     Subjective:  Ms. Kamran Eddy is feeling overall improved. Still dyspneic with minimal exertion, just moving in the bed  -patient lives at home alone. RN reports patient was too weak to use bedside commode with significant assistance     Objective:   Patient Vitals for the past 4 hrs:   BP Temp Temp src Pulse Resp SpO2   12/14/21 1420 123/66 99.3 °F (37.4 °C) Oral 86 16 96 %   12/14/21 1300 134/68 -- -- 94 22 95 %   12/14/21 1137 -- -- -- -- 29 93 %          Intake/Output Summary (Last 24 hours) at 12/14/2021 1510  Last data filed at 12/14/2021 0281  Gross per 24 hour   Intake --   Output 2100 ml   Net -2100 ml       Physical Exam:    Gen: Elderly female. No distress. Alert. Eyes: PERRL. No sclera icterus. No conjunctival injection. ENT: No discharge. Pharynx clear. Neck: No JVD. Trachea midline. Resp: minimal accessory muscle use. No crackles. No wheezes. No rhonchi. Diminished breath sounds throughout  CV: Regular rate. Regular rhythm. No murmur. No rub. No edema. GI: Non-tender. Non-distended. Normal bowel sounds. Skin: Warm and dry. No nodule on exposed extremities. No rash on exposed extremities. I did not examine her chronic sacral ulcer  There is a chronic wound to the right heel that does not appear infected   M/S: No cyanosis. No joint deformity. No clubbing. Neuro: Awake. Grossly nonfocal    Psych: Oriented x 3. No anxiety or agitation.        Scheduled Meds:   aspirin EC  81 mg Oral Daily    atorvastatin  40 mg Oral Nightly    clopidogrel  75 mg Oral Daily    DULoxetine  60 mg Oral Daily    fluticasone  1 spray Each Nostril Daily    gabapentin  300 mg Oral BID    insulin glargine  15 Units SubCUTAneous Nightly    metoprolol succinate  12.5 mg Oral Daily    midodrine  2.5 mg Oral TID    morphine  15 mg Oral 2 times per day    furosemide  40 mg IntraVENous BID    sodium chloride flush  5-40 mL IntraVENous 2 times per day    enoxaparin  30 mg SubCUTAneous BID    insulin lispro  0-12 Units SubCUTAneous TID WC    insulin lispro  0-6 Units SubCUTAneous Nightly    dexamethasone  6 mg IntraVENous Q24H    [START ON 12/15/2021] remdesivir IVPB  100 mg IntraVENous Q24H    albuterol-ipratropium  1 puff Inhalation 4x daily       Continuous Infusions:   dextrose      sodium chloride         PRN Meds:  albuterol sulfate HFA, cyclobenzaprine, glucose, dextrose, glucagon (rDNA), dextrose, sodium chloride flush, sodium chloride, ondansetron **OR** ondansetron, polyethylene glycol, acetaminophen **OR** acetaminophen, guaiFENesin-dextromethorphan      Data:  CBC:   Recent Labs     12/13/21  1410 12/14/21  1338   WBC 3.8* 3.6*   HGB 11.4* 11.5*   HCT 34.0* 34.9*   MCV 93.8 94.3    175     BMP:   Recent Labs     12/13/21  1410 12/14/21  1338    126*   K 3.5 3.0*   CL 94* 83*   CO2 32 26   BUN 18 19   CREATININE 0.9 0.8     LIVER PROFILE:   Recent Labs     12/13/21  1410 12/14/21  1338   AST 14* 12*   ALT 8* 7*   BILITOT 0.6 0.9   ALKPHOS 129 122     PT/INR: No results for input(s): PROTIME, INR in the last 72 hours. CULTURES  COVID 19 PCR: detected  Flu A/B: neg/neg      RADIOLOGY  CT CHEST PULMONARY EMBOLISM W CONTRAST   Final Result   No evidence of pulmonary embolism or acute pulmonary abnormality. Scarring at the right lung base         XR CHEST PORTABLE   Final Result   Bilateral pleural effusions. ABHISHEK 10/29/21  -- Ejection fraction is visually estimated to be 50-55 %. -- Mild mitral regurgitation. -- The left atrium is mildly dilated. There is no evidence of mass or thrombus in the left atrium or appendage.   -- Bubble study was performed and showed grade I pulmonary arteriovenous malformation ( < 30 bubbles in left ventricle after 3rd  cardiac cycle). Moderate layered plaque is noted in the ascending aorta and arch. TTE 8/11/21  -Left ventricular cavity size is enlarged. LVESD=5.57 cm  -Overall left ventricular systolic function appears mildly reduced.  -Ejection fraction is visually estimated to be 50%. -There is mild hypokinesis of the basal to mid inferior and inferolateral walls. -Grade II diastolic dysfunction with elevated LV filling pressures.  -The right ventricle is mildly enlarged.  -Right ventricular systolic function is normal.  -Mitral valve leaflets appear mildly thickened. -Mitral regurgitation that may be severe. -The aortic valve leaflets appear mildly thickened. -Mild aortic regurgitation.  -Moderate tricuspid regurgitation with a PASP of 57 mmHg.  -Trivial pulmonic regurgitation. Assessment/Plan:    #Acute hypoxic respiratory failure  - 2/2 CHF and COVID 19  - CT chest is neg for PE  - patient does not use home O2  - she required up to 6L on admission. She quickly improved after IV lasix. She is on RA now, but still with increased work of breathing   - CHF, COVID tx as below     #Acute on chronic diastolic CHF  - Home diuretics: Demadex 40 mg daily, spironolactone 25 mg daily  - sp IV lasix 40 mg x 2- she had good UOP and resolution of hypoxia  - resume home diuretics on 12/15    #COVID 19  - in a vaccinated patient. She completed 2 dose vaccination with Pfizer in March 2021, has not received a booster dose.   She is immunocompromised on Enbrel and daily prednisone for RA  - symptoms of dyspnea present x 1 day before admit   - she was hypoxic on admission, as noted above, but quickly improved with diuresis and therefore suspect hypoxia may have been more related to acute CHF  - Decadron D#2  - Remdesivir D#2- consider stopping if remains on RA tomorrow 12/15   - appreciate pulmonary input   - droplet + isolation    #Hyponatremia  - 137-> 129 (corrected for BG)  - stop IV lasix and start PO diuretics 12/15  - repeat BMP in AM    #Hypokalemia   - replace     #Debility  - patient very weak, needs PT OT eval- she lives at home alone     #COPD  - without AE  - combivent     #CAD post CABG and stents   #PVD sp right femoral popliteal bypass surgery   - cont ASA, plavix, statin, toprol     #Mild Mitral Regurgitation   - f/b cardio    #Rheumatoid arthritis   - on Enbrel and daily prednisone at home- held     #Stage 4 sacral pressure ulcer  - f/b Dr Meredith Nix [facility]  - wound RN consult    #DM2  - with hyperglycemia  - cont Lantus  - use SSI     #DANIELLE on CPAP    #Chronic pain  - cont home morphine and percocet  - cont gabapentin     DVT Prophylaxis: Lovenox 30 mg BID   Diet: ADULT DIET; Regular; 3 carb choices (45 gm/meal)  Code Status: Full Code    Dispo: she is improved. On RA today. Still very weak.   Needs PT OT to eval.  Likely dc 12/15    Jalen Crwaford PA-C  12/14/2021 3:34 PM

## 2021-12-14 NOTE — PROGRESS NOTES
Remdesivir Initiation Note    Patient meets criteria for initiation of remdesivir   Known or suspected COVID-19  Severe disease (SpO2 ? 94% on RA, requiring supplemental O2, or requiring invasive mechanical ventilation)  Acceptable renal function  CrCl ? 30 ml/min based on SCr obtained prior to initiation OR   CrCl < 30 ml/min but the potential benefit of remdesivir outweighs the risk  Acceptable hepatic function (ALT within 10 times ULN)        Liver function tests will be monitored daily while on remdesivir.

## 2021-12-14 NOTE — PROGRESS NOTES
/66   Pulse 86   Temp 99.3 °F (37.4 °C) (Oral)   Resp 16   Ht 5' 9\" (1.753 m)   Wt 135 lb (61.2 kg)   SpO2 96%   BMI 19.94 kg/m²   Pt [facility] in stable condition

## 2021-12-14 NOTE — CONSULTS
Reason for referral and CC: COVID 23    HISTORY OF PRESENT ILLNESS:-year-old female with a history of COPD and diabetes and chronic wounds presents with increasing shortness of breath. She was diagnosed with COVID-19 pneumonia and started on supplemental oxygen. Today she still feels short of breath and very weak but has been weaned off of O2. Dry cough. She received Lasix. She has been vaccinated. Past Medical History:   Diagnosis Date    Acute on chronic diastolic heart failure due to coronary artery disease (HCC)     Acute respiratory failure with hypoxia ([facility])     Atherosclerosis of native artery of right lower extremity with rest pain ([address] 07/25/2017    Back pain     Branch retinal vein occlusion 07/20/2012    Bronchiectasis with acute exacerbation ([facility])     Cellulitis and abscess of left leg 7/11/2021    Cellulitis of left lower extremity 7/11/2021    S/P vein harvest 05/2021 for CABG    Closed compression fracture of thoracic vertebra ([address] 01/15/2020    Closed fracture of facial bone with routine healing 11/21/2016    Closed jaw fracture ([address] 01/15/2020    Community acquired pneumonia of left lower lobe of lung     Compression fracture of L1 lumbar vertebra ([address] 01/15/2020    COPD (chronic obstructive pulmonary disease) ([facility])     Fracture of tibial plateau, closed, left, initial encounter 12/05/2017    Minimally displaced zone I fracture of sacrum ([address] 09/02/2020    MRSA (methicillin resistant staph aureus) culture positive 07/2021    MRSA (methicillin resistant Staphylococcus aureus) 07/13/2021    wound    Mucus plugging of bronchi     NSTEMI (non-ST elevated myocardial infarction) ([address] 4/23/2021    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 Bone density test last year showed severe osteoporosis.   Recently, two broken vertebrae (L1, L2) due to coughing. Diagnosed with tracheomalacia and stated she must cough very hard to clear phlegm. Was coughing due to upper respiratory infections which have been treated. Hx of laminectomy and recent kyphoplasty.    Refractured her jawbone which was previously repaired wi    Post herpetic neuralgia     Proximal humerus fracture 10/01/2019    Rheumatoid arthritis ([address]     Shingles 05/2020    Sleep apnea     Status post incision and drainage 07/2021    Left Leg    Surgical wound dehiscence, initial encounter (at [facility] harvest [facility]) 7/12/2021    Temporal arteritis ([address] 07/10/2013    Tobacco use 10/19/2017    Tracheomalacia     Vitreous hemorrhage, right eye ([address] 02/21/2020     Past Surgical History:   Procedure Laterality Date    ARTERY BIOPSY Right 03/01/2021    at [address]  05/30/2013    left temporal artery biopsy    BACK SURGERY  08/2020    BRONCHOSCOPY      BRONCHOSCOPY N/A 06/12/2019    BRONCHOSCOPY ALVEOLAR LAVAGE performed by Melonie Mays MD at [address]  05/03/2021    CATARACT REMOVAL      CORONARY ARTERY BYPASS GRAFT N/A 05/03/2021    CORONARY ARTERY BYPASS X3 WITH LEFT ATRIAL APPENDAGE CLIP, 5 LEVEL BILATERAL INTERCOSTAL NERVE BLOCK, STERNAL PLATING performed by Laureano Lee MD at [facility]      IR MIDLINE CATH  7/14/2021    IR MIDLINE CATH 7/14/2021 [facility] SPECIAL PROCEDURES    KNEE ARTHROSCOPY      left    KYPHOSIS SURGERY      LAMINECTOMY      LEG SURGERY Left 7/13/2021    INCISION AND DEBRIDEMENT LEFT LOWER LEG WOUND WITH POSSIBLE WOUND VAC PLACEMENT performed by Nancy Montelongo MD at [facility]  02/2020    MEDIASTINOSCOPY N/A 05/05/2021    MEDIASTINAL EXPLORATION AND EVACUATION OF HEMATOMA performed by Laureano Lee MD at [facility]  01/08/2021    sacral sore throat  Eyes: Negative for redness   Respiratory: + for dyspnea, cough  Cardiovascular: Negative for chest pain  Gastrointestinal: Negative for vomiting, diarrhea   Genitourinary: Negative for hematuria   Musculoskeletal: Negative for arthralgias   Skin: Negative for rash  Neurological: Negative for syncope  Hematological: Negative for adenopathy  Psychiatric/Behavorial: Negative for anxiety    PHYSICAL EXAM: /68   Pulse 94   Temp 98.2 °F (36.8 °C) (Oral)   Resp 22   Ht 5' 9\" (1.753 m)   Wt 135 lb (61.2 kg)   SpO2 95%   BMI 19.94 kg/m²  on RA  Constitutional:  No acute distress. Eyes: PERRL. Conjunctivae anicteric. ENT: Normal nose. Normal tongue. Neck:  Trachea is midline. No thyroid tenderness. Respiratory: No accessory muscle usage. decreased breath sounds. No wheezes. No rales. No Rhonchi. Cardiovascular: Normal S1S2. No digit clubbing. No digit cyanosis. No LE edema. Capillary refill is normal.  Gastrointestinal: No mass palpated. No tenderness palpated. No umbilical hernia. Lymphatic: No cervical or supraclavicular adenopathy. Skin: No rash on the exposed extremities. No Nodules or induration on exposed extremities. Psychiatric: No anxiety or Agitation. Alert and Oriented to person, place and time. CBC:   Recent Labs     12/13/21  1410 12/14/21  1338   WBC 3.8* 3.6*   HGB 11.4* 11.5*   HCT 34.0* 34.9*   MCV 93.8 94.3    175     BMP:   Recent Labs     12/13/21  1410      K 3.5   CL 94*   CO2 32   BUN 18   CREATININE 0.9        Recent Labs     12/13/21  1410   AST 14*   ALT 8*   BILITOT 0.6   ALKPHOS 129     No results for input(s): PROTIME, INR in the last 72 hours. No results for input(s): NITRITE, COLORU, PHUR, LABCAST, WBCUA, RBCUA, MUCUS, TRICHOMONAS, YEAST, BACTERIA, CLARITYU, SPECGRAV, LEUKOCYTESUR, UROBILINOGEN, BILIRUBINUR, BLOODU, GLUCOSEU, AMORPHOUS in the last 72 hours.     Invalid input(s): KETONESU  No results for input(s): PHART, MTG9KVR, PO2ART in the last 72 hours. Chest imaging was reviewed by me and showed CTPA: no PE. Lungs/pleura: The lungs are without acute process.  Linear parenchymal   scarring in pleural thickening seen at the right lung base.  No focal   consolidation or pulmonary edema.  No evidence of pleural effusion or   pneumothorax. ASSESSMENT:  · COVID-19 in a fully vaccinated individual  · Acute hypoxic respiratory failure -improved  · Leukopenia  · COPD not in exacerbation  · RA on Enbrel  · CAD with h/o CABG 5/3/21  · DANIELLE on CPAP    PLAN:  Droplet Plus Airborne Precautions  Ok to not use CPAP tonight   Supplemental oxygen to keep saturation greater than 92%  Decadron 6 mg QD, D#1, monitor glucose closely  Remdesevir D#1  Lovenox twice daily  Combivent  Diuresis per IM  · Consider stopping remdesivir tomorrow if she remains on room air. Consider stopping steroids after 5 days if pt remains on room air. Please call with questions.     Thank you Jammie Jones, * for this consult

## 2021-12-14 NOTE — CARE COORDINATION
Case Management Assessment  Initial Evaluation      Patient Name: Mark Arvizu  YOB: 1950  Diagnosis: Acute respiratory failure with hypoxia ([address] [J96.01]  Date / Time: 12/13/2021  1:27 PM    Admission status/Date:  12/13/2021  Chart Reviewed: Yes      Patient Interviewed: No   Family Interviewed:  Yes - daughter Prince Shanks  (phone)    Hospitalization in the last 30 days:  No      Health Care Decision Maker :   Primary Decision MakerEgmatty Johnson Child - [phone]    Secondary Decision Maker: Christine Robert - Child - [phone]    (CM - must 1st enter selection under Navigator - emergency contact- Health Care Decision Maker Relationship and pick relationship)   Who do you trust or have selected to make healthcare decisions for you    Current PCP: Manjeet Buckley [facility]) q 2 weeks    Financial  BC[employer] Medicare  Precert required for SNF : YES       3 night stay required - NA    ADLS  Support Systems/Care Needs:    Transportation: daughter    Meal Preparation: sels    Housing  Living Arrangements: Lives alone but daughter nearby. Steps: one step into house  Intent for return to present living arrangements: Yes  Identified Issues: can stay with daughter while in recovery if 24/7 needed    [address] with [address] : No but used [employer] in the recent past for wound vac     Passport/Waiver : No  :                      Phone Number:    Passport/Waiver Services: NA          [address] [place] [address] Provider: Nicole  Equipment:   Walker___Cane___RTS___ BSC___Shower Chair___Hospital Bed___W/C_X___Other________  02 at __2__Liter(s)---wears(frequency)__PRN_____ HHN ___ CPAP___ BiPap___   N/A____      Home O2 Use :  Yes  2 liters PRN at baseline- may need new continuous orders pending walk test      [employer]  Dialysis:  No    · Agency:  · Location:  · Dialysis Schedule:  · Phone:   · Fax:     Other Community Services: Dr. Joanne Luna Cobb [address]- Q 2 weeks    DISCHARGE PLAN: Explained Case Management role/services. Chart reviewed. Met with pt's daughter by phone due to COVID and explained the role of the CM. Plans to return home. She can go to stay with daughter who is a RN is 24/7 supervision is needed. +Active at [facility] [address] for coccyx wound and sees Dr. Jaxon Latham q2 weeks. + [employer] (PRN) needs continuous orders as she has only been using O2 recently PRN. Will need walk test. +CM followiing for DME and HHC needs.

## 2021-12-15 ENCOUNTER — TELEPHONE (OUTPATIENT)
Dept: PULMONOLOGY | Age: 71
End: 2021-12-15

## 2021-12-15 ENCOUNTER — HOSPITAL ENCOUNTER (OUTPATIENT)
Dept: WOUND CARE | Age: 71
Discharge: HOME | End: 2021-12-15

## 2021-12-15 LAB
A/G RATIO: 0.6 (ref 1.1–2.2)
ALBUMIN SERPL-MCNC: 2.5 G/DL (ref 3.4–5)
ALP BLD-CCNC: 99 U/L (ref 40–129)
ALT SERPL-CCNC: 6 U/L (ref 10–40)
ANION GAP SERPL CALCULATED.3IONS-SCNC: 10 MMOL/L (ref 3–16)
AST SERPL-CCNC: 12 U/L (ref 15–37)
BASOPHILS ABSOLUTE: 0 K/UL (ref 0–0.2)
BASOPHILS RELATIVE PERCENT: 0.4 %
BILIRUB SERPL-MCNC: 0.5 MG/DL (ref 0–1)
BILIRUBIN DIRECT: <0.2 MG/DL (ref 0–0.3)
BILIRUBIN, INDIRECT: NORMAL MG/DL (ref 0–1)
BUN BLDV-MCNC: 34 MG/DL (ref 7–20)
C-REACTIVE PROTEIN: 37.9 MG/L (ref 0–5.1)
CALCIUM SERPL-MCNC: 8.2 MG/DL (ref 8.3–10.6)
CHLORIDE BLD-SCNC: 94 MMOL/L (ref 99–110)
CO2: 28 MMOL/L (ref 21–32)
CREAT SERPL-MCNC: 1 MG/DL (ref 0.6–1.2)
D DIMER: >5250 NG/ML DDU (ref 0–229)
EOSINOPHILS ABSOLUTE: 0 K/UL (ref 0–0.6)
EOSINOPHILS RELATIVE PERCENT: 0 %
GFR AFRICAN AMERICAN: >60
GFR NON-AFRICAN AMERICAN: 55
GLUCOSE BLD-MCNC: 235 MG/DL (ref 70–99)
GLUCOSE BLD-MCNC: 249 MG/DL (ref 70–99)
GLUCOSE BLD-MCNC: 250 MG/DL (ref 70–99)
GLUCOSE BLD-MCNC: 279 MG/DL (ref 70–99)
GLUCOSE BLD-MCNC: 279 MG/DL (ref 70–99)
HCT VFR BLD CALC: 30.8 % (ref 36–48)
HEMOGLOBIN: 10.3 G/DL (ref 12–16)
INR BLD: 1.04 (ref 0.88–1.12)
LYMPHOCYTES ABSOLUTE: 0.9 K/UL (ref 1–5.1)
LYMPHOCYTES RELATIVE PERCENT: 22.3 %
MCH RBC QN AUTO: 31.5 PG (ref 26–34)
MCHC RBC AUTO-ENTMCNC: 33.6 G/DL (ref 31–36)
MCV RBC AUTO: 93.9 FL (ref 80–100)
MONOCYTES ABSOLUTE: 0.5 K/UL (ref 0–1.3)
MONOCYTES RELATIVE PERCENT: 13.1 %
NEUTROPHILS ABSOLUTE: 2.7 K/UL (ref 1.7–7.7)
NEUTROPHILS RELATIVE PERCENT: 64.2 %
PDW BLD-RTO: 14.4 % (ref 12.4–15.4)
PERFORMED ON: ABNORMAL
PLATELET # BLD: 175 K/UL (ref 135–450)
PMV BLD AUTO: 7.8 FL (ref 5–10.5)
POTASSIUM REFLEX MAGNESIUM: 4 MMOL/L (ref 3.5–5.1)
PROTHROMBIN TIME: 11.8 SEC (ref 9.9–12.7)
RBC # BLD: 3.28 M/UL (ref 4–5.2)
SODIUM BLD-SCNC: 132 MMOL/L (ref 136–145)
TOTAL PROTEIN: 6.5 G/DL (ref 6.4–8.2)
WBC # BLD: 4.1 K/UL (ref 4–11)

## 2021-12-15 PROCEDURE — 2580000003 HC RX 258: Performed by: INTERNAL MEDICINE

## 2021-12-15 PROCEDURE — 97535 SELF CARE MNGMENT TRAINING: CPT

## 2021-12-15 PROCEDURE — 97161 PT EVAL LOW COMPLEX 20 MIN: CPT

## 2021-12-15 PROCEDURE — 80053 COMPREHEN METABOLIC PANEL: CPT

## 2021-12-15 PROCEDURE — 85025 COMPLETE CBC W/AUTO DIFF WBC: CPT

## 2021-12-15 PROCEDURE — 99233 SBSQ HOSP IP/OBS HIGH 50: CPT | Performed by: INTERNAL MEDICINE

## 2021-12-15 PROCEDURE — 2500000003 HC RX 250 WO HCPCS: Performed by: INTERNAL MEDICINE

## 2021-12-15 PROCEDURE — 6370000000 HC RX 637 (ALT 250 FOR IP): Performed by: PHYSICIAN ASSISTANT

## 2021-12-15 PROCEDURE — 97112 NEUROMUSCULAR REEDUCATION: CPT

## 2021-12-15 PROCEDURE — 97165 OT EVAL LOW COMPLEX 30 MIN: CPT

## 2021-12-15 PROCEDURE — 97116 GAIT TRAINING THERAPY: CPT

## 2021-12-15 PROCEDURE — 97530 THERAPEUTIC ACTIVITIES: CPT

## 2021-12-15 PROCEDURE — 2700000000 HC OXYGEN THERAPY PER DAY

## 2021-12-15 PROCEDURE — 94761 N-INVAS EAR/PLS OXIMETRY MLT: CPT

## 2021-12-15 PROCEDURE — 36415 COLL VENOUS BLD VENIPUNCTURE: CPT

## 2021-12-15 PROCEDURE — 86140 C-REACTIVE PROTEIN: CPT

## 2021-12-15 PROCEDURE — 6360000002 HC RX W HCPCS: Performed by: INTERNAL MEDICINE

## 2021-12-15 PROCEDURE — 94640 AIRWAY INHALATION TREATMENT: CPT

## 2021-12-15 PROCEDURE — 85379 FIBRIN DEGRADATION QUANT: CPT

## 2021-12-15 PROCEDURE — 6370000000 HC RX 637 (ALT 250 FOR IP): Performed by: INTERNAL MEDICINE

## 2021-12-15 PROCEDURE — 85610 PROTHROMBIN TIME: CPT

## 2021-12-15 PROCEDURE — 1200000000 HC SEMI PRIVATE

## 2021-12-15 RX ADMIN — CLOPIDOGREL BISULFATE 75 MG: 75 TABLET ORAL at 08:52

## 2021-12-15 RX ADMIN — INSULIN GLARGINE 15 UNITS: 100 INJECTION, SOLUTION SUBCUTANEOUS at 21:31

## 2021-12-15 RX ADMIN — MIDODRINE HYDROCHLORIDE 2.5 MG: 5 TABLET ORAL at 21:29

## 2021-12-15 RX ADMIN — TORSEMIDE 40 MG: 20 TABLET ORAL at 08:52

## 2021-12-15 RX ADMIN — ENOXAPARIN SODIUM 30 MG: 100 INJECTION SUBCUTANEOUS at 21:29

## 2021-12-15 RX ADMIN — MORPHINE SULFATE 15 MG: 15 TABLET, EXTENDED RELEASE ORAL at 08:52

## 2021-12-15 RX ADMIN — MORPHINE SULFATE 15 MG: 15 TABLET, EXTENDED RELEASE ORAL at 21:29

## 2021-12-15 RX ADMIN — INSULIN LISPRO 4 UNITS: 100 INJECTION, SOLUTION INTRAVENOUS; SUBCUTANEOUS at 17:03

## 2021-12-15 RX ADMIN — DEXAMETHASONE SODIUM PHOSPHATE 6 MG: 10 INJECTION INTRAMUSCULAR; INTRAVENOUS at 06:25

## 2021-12-15 RX ADMIN — GABAPENTIN 300 MG: 300 CAPSULE ORAL at 21:29

## 2021-12-15 RX ADMIN — SPIRONOLACTONE 25 MG: 25 TABLET ORAL at 08:52

## 2021-12-15 RX ADMIN — INSULIN LISPRO 6 UNITS: 100 INJECTION, SOLUTION INTRAVENOUS; SUBCUTANEOUS at 09:09

## 2021-12-15 RX ADMIN — DULOXETINE HYDROCHLORIDE 60 MG: 60 CAPSULE, DELAYED RELEASE ORAL at 08:52

## 2021-12-15 RX ADMIN — MIDODRINE HYDROCHLORIDE 2.5 MG: 5 TABLET ORAL at 08:52

## 2021-12-15 RX ADMIN — MIDODRINE HYDROCHLORIDE 2.5 MG: 5 TABLET ORAL at 14:57

## 2021-12-15 RX ADMIN — ATORVASTATIN CALCIUM 40 MG: 40 TABLET, FILM COATED ORAL at 21:29

## 2021-12-15 RX ADMIN — ENOXAPARIN SODIUM 30 MG: 100 INJECTION SUBCUTANEOUS at 09:00

## 2021-12-15 RX ADMIN — ASPIRIN 81 MG: 81 TABLET, COATED ORAL at 08:52

## 2021-12-15 RX ADMIN — OXYCODONE AND ACETAMINOPHEN 1 TABLET: 10; 325 TABLET ORAL at 12:09

## 2021-12-15 RX ADMIN — Medication 10 ML: at 09:00

## 2021-12-15 RX ADMIN — REMDESIVIR 100 MG: 100 INJECTION, POWDER, LYOPHILIZED, FOR SOLUTION INTRAVENOUS at 09:06

## 2021-12-15 RX ADMIN — Medication 10 ML: at 21:31

## 2021-12-15 RX ADMIN — IPRATROPIUM BROMIDE AND ALBUTEROL 1 PUFF: 20; 100 SPRAY, METERED RESPIRATORY (INHALATION) at 07:33

## 2021-12-15 RX ADMIN — INSULIN LISPRO 6 UNITS: 100 INJECTION, SOLUTION INTRAVENOUS; SUBCUTANEOUS at 12:09

## 2021-12-15 RX ADMIN — GABAPENTIN 300 MG: 300 CAPSULE ORAL at 08:52

## 2021-12-15 ASSESSMENT — PAIN SCALES - GENERAL
PAINLEVEL_OUTOF10: 8
PAINLEVEL_OUTOF10: 7
PAINLEVEL_OUTOF10: 7
PAINLEVEL_OUTOF10: 8

## 2021-12-15 ASSESSMENT — PAIN DESCRIPTION - PAIN TYPE: TYPE: CHRONIC PAIN

## 2021-12-15 ASSESSMENT — PAIN DESCRIPTION - ONSET: ONSET: GRADUAL

## 2021-12-15 ASSESSMENT — PAIN DESCRIPTION - LOCATION: LOCATION: BACK

## 2021-12-15 ASSESSMENT — PAIN DESCRIPTION - FREQUENCY: FREQUENCY: CONTINUOUS

## 2021-12-15 ASSESSMENT — PAIN DESCRIPTION - DESCRIPTORS: DESCRIPTORS: BURNING

## 2021-12-15 NOTE — PROGRESS NOTES
RT Inhaler-Nebulizer Bronchodilator Protocol Note    There is a bronchodilator order in the chart from a provider indicating to follow the RT Bronchodilator Protocol and there is an Initiate RT Inhaler-Nebulizer Bronchodilator Protocol order as well (see protocol at bottom of note). CXR Findings:  XR CHEST PORTABLE    Result Date: 12/13/2021  Bilateral pleural effusions. The findings from the last RT Protocol Assessment were as follows:   History Pulmonary Disease: (P) Chronic pulmonary disease  Respiratory Pattern: (P) Regular pattern and RR 12-20 bpm  Breath Sounds: (P) Slightly diminished and/or crackles  Cough: (P) Strong, spontaneous, non-productive  Indication for Bronchodilator Therapy: (P) On home bronchodilators  Bronchodilator Assessment Score: (P) 4    Aerosolized bronchodilator medication orders have been revised according to the RT Inhaler-Nebulizer Bronchodilator Protocol below. Respiratory Therapist to perform RT Therapy Protocol Assessment initially then follow the protocol. Repeat RT Therapy Protocol Assessment PRN for score 0-3 or on second treatment, BID, and PRN for scores above 3. No Indications - adjust the frequency to every 6 hours PRN wheezing or bronchospasm, if no treatments needed after 48 hours then discontinue using Per Protocol order mode. If indication present, adjust the RT bronchodilator orders based on the Bronchodilator Assessment Score as indicated below. Use Inhaler orders unless patient has one or more of the following: on home nebulizer, not able to hold breath for 10 seconds, is not alert and oriented, cannot activate and use MDI correctly, or respiratory rate 25 breaths per minute or more, then use the equivalent nebulizer order(s) with same Frequency and PRN reasons based on the score. If a patient is on this medication at home then do not decrease Frequency below that used at home.     0-3 - enter or revise RT bronchodilator order(s) to equivalent RT Bronchodilator order with Frequency of every 4 hours PRN for wheezing or increased work of breathing using Per Protocol order mode. 4-6 - enter or revise RT Bronchodilator order(s) to two equivalent RT bronchodilator orders with one order with BID Frequency and one order with Frequency of every 4 hours PRN wheezing or increased work of breathing using Per Protocol order mode. 7-10 - enter or revise RT Bronchodilator order(s) to two equivalent RT bronchodilator orders with one order with TID Frequency and one order with Frequency of every 4 hours PRN wheezing or increased work of breathing using Per Protocol order mode. 11-13 - enter or revise RT Bronchodilator order(s) to one equivalent RT bronchodilator order with QID Frequency and an Albuterol order with Frequency of every 4 hours PRN wheezing or increased work of breathing using Per Protocol order mode. Greater than 13 - enter or revise RT Bronchodilator order(s) to one equivalent RT bronchodilator order with every 4 hours Frequency and an Albuterol order with Frequency of every 2 hours PRN wheezing or increased work of breathing using Per Protocol order mode.          Electronically signed by Ary Martin RCP on 12/15/2021 at 8:47 AM

## 2021-12-15 NOTE — CARE COORDINATION
[facility]  Received referral regarding HC from [address]. Liaison to follow up with [facility] regarding [facility] coverage closer to discharge.       Electronically signed by Moe Reich RN on 12/15/2021 at 12:22 PM

## 2021-12-15 NOTE — PROGRESS NOTES
Comprehensive Nutrition Assessment    Type and Reason for Visit:  Initial, Positive Nutrition Screen, Wound    Nutrition Recommendations/Plan:   1. Continue CCC-3 diet  2. Added Glucerna to all meals     Nutrition Assessment:    Pt. nutritionally compromised AEB she was admitted for dyspnea r/t  acute CHFand found to have COVID 19. At risk for further nutrition compromise r/t stage 4 sacral pressure injury and a possible weight loss of 30# in 8 months. Will add Glucerna to meals . Malnutrition Assessment:  Malnutrition Status: At Risk d/t stage 4 wound  Unable to asses d/t no current weigh tobtain for this admission     Estimated Daily Nutrient Needs:  Energy (kcal):  4935-1158 based ~ 25-30 kcal/kg IBW; Weight Used for Energy Requirements:  Ideal     Protein (g):  100-131 gr/kg IBW; Weight Used for Protein Requirements:  Ideal        Fluid (ml/day):  3664-2568; Method Used for Fluid Requirements:  1 ml/kcal      Nutrition Related Findings:  ; 6L O2/NC; BUN 34; NO edema noted per MD this am though she has dx of Chronic Edema      Wounds:  Pressure Injury, Stage IV       Current Nutrition Therapies:    ADULT DIET;  Regular; 3 carb choices (45 gm/meal)    Anthropometric Measures:  · Height: 5' 9\" (175.3 cm)  · Current Body Weight:  (Pt was not weighed)   · Admission Body Weight:  (Not obatined)    · Usual Body Weight: 165 lb (74.8 kg) (Feb 2021)     · Ideal Body Weight: 145 lbs;BMI Categories:  (Unable to assess d/t no actual body weight available)       Nutrition Diagnosis:   · Inadequate oral intake related to catabolic illness as evidenced by intake 51-75%, wounds, weight loss      Nutrition Interventions:   Food and/or Nutrient Delivery:  Start Oral Nutrition Supplement, Continue Current Diet  Nutrition Education/Counseling:  No recommendation at this time   Coordination of Nutrition Care:  Continue to monitor while inpatient    Goals:  pt will increase her intake to > 50% of meals and supps Nutrition Monitoring and Evaluation:   Behavioral-Environmental Outcomes:  None Identified   Food/Nutrient Intake Outcomes:  Food and Nutrient Intake, Supplement Intake  Physical Signs/Symptoms Outcomes:  Biochemical Data, Weight     Discharge Planning:     Too soon to determine     Electronically signed by Angelo Cohen RD, LD on 12/15/21 at 3:19 PM EST    Contact: [phone]

## 2021-12-15 NOTE — PROGRESS NOTES
RT Inhaler-Nebulizer Bronchodilator Protocol Note    There is a bronchodilator order in the chart from a provider indicating to follow the RT Bronchodilator Protocol and there is an Initiate RT Inhaler-Nebulizer Bronchodilator Protocol order as well (see protocol at bottom of note). CXR Findings:  XR CHEST PORTABLE    Result Date: 12/13/2021  Bilateral pleural effusions. The findings from the last RT Protocol Assessment were as follows:   History Pulmonary Disease: Chronic pulmonary disease  Respiratory Pattern: Regular pattern and RR 12-20 bpm  Breath Sounds: Slightly diminished and/or crackles  Cough: Strong, spontaneous, non-productive  Indication for Bronchodilator Therapy: On home bronchodilators  Bronchodilator Assessment Score: 4    Aerosolized bronchodilator medication orders have been revised according to the RT Inhaler-Nebulizer Bronchodilator Protocol below. Respiratory Therapist to perform RT Therapy Protocol Assessment initially then follow the protocol. Repeat RT Therapy Protocol Assessment PRN for score 0-3 or on second treatment, BID, and PRN for scores above 3. No Indications - adjust the frequency to every 6 hours PRN wheezing or bronchospasm, if no treatments needed after 48 hours then discontinue using Per Protocol order mode. If indication present, adjust the RT bronchodilator orders based on the Bronchodilator Assessment Score as indicated below. Use Inhaler orders unless patient has one or more of the following: on home nebulizer, not able to hold breath for 10 seconds, is not alert and oriented, cannot activate and use MDI correctly, or respiratory rate 25 breaths per minute or more, then use the equivalent nebulizer order(s) with same Frequency and PRN reasons based on the score. If a patient is on this medication at home then do not decrease Frequency below that used at home.     0-3 - enter or revise RT bronchodilator order(s) to equivalent RT Bronchodilator order with Frequency of every 4 hours PRN for wheezing or increased work of breathing using Per Protocol order mode. 4-6 - enter or revise RT Bronchodilator order(s) to two equivalent RT bronchodilator orders with one order with BID Frequency and one order with Frequency of every 4 hours PRN wheezing or increased work of breathing using Per Protocol order mode. 7-10 - enter or revise RT Bronchodilator order(s) to two equivalent RT bronchodilator orders with one order with TID Frequency and one order with Frequency of every 4 hours PRN wheezing or increased work of breathing using Per Protocol order mode. 11-13 - enter or revise RT Bronchodilator order(s) to one equivalent RT bronchodilator order with QID Frequency and an Albuterol order with Frequency of every 4 hours PRN wheezing or increased work of breathing using Per Protocol order mode. Greater than 13 - enter or revise RT Bronchodilator order(s) to one equivalent RT bronchodilator order with every 4 hours Frequency and an Albuterol order with Frequency of every 2 hours PRN wheezing or increased work of breathing using Per Protocol order mode.       Electronically signed by Tanisha Driscoll RCP on 12/14/2021 at 7:50 PM

## 2021-12-15 NOTE — TELEPHONE ENCOUNTER
Cancelled appointment on 12/15/21 with  for 6 month sleep. Reason: IP MHC      Last OV     ASSESSMENT: 12/14/21  hospitals note  · COVID-19 in a fully vaccinated individual  · Acute hypoxic respiratory failure -improved  · Leukopenia  · COPD not in exacerbation  · RA on Enbrel  · CAD with h/o CABG 5/3/21  · DANIELLE on CPAP     PLAN:  · Droplet Plus Airborne Precautions  · Ok to not use CPAP tonight   · Supplemental oxygen to keep saturation greater than 92%  · Decadron 6 mg QD, D#1, monitor glucose closely  · Remdesevir D#1  · Lovenox twice daily  · Combivent  · Diuresis per IM  · Consider stopping remdesivir tomorrow if she remains on room air. Consider stopping steroids after 5 days if pt remains on room air.  Please call with questions.     Thank you Kaleb Spangler, * for this consult

## 2021-12-15 NOTE — PLAN OF CARE
Nutrition Problem #1: Inadequate oral intake  Intervention: Food and/or Nutrient Delivery: Start Oral Nutrition Supplement, Continue Current Diet  Nutritional Goals: pt will increase her intake to > 50% of meals and supps

## 2021-12-15 NOTE — PROGRESS NOTES
BP (!) 98/48   Pulse 75   Temp 97.9 °F (36.6 °C) (Oral)   Resp 16   Ht 5' 9\" (1.753 m)   Wt 135 lb (61.2 kg)   SpO2 92%   BMI 19.94 kg/m²     Assessment complete. Meds passed. Pt denies needs at this time. MD stated to give demadex, aldactone, and midodrine; but to hold metoprolol ER. Pt placed on 2l of oxygen at this time. MD notified. Bedside Mobility Assessment Tool (BMAT):     Assessment Level 1- Sit and Shake    1. From a semi-reclined position, ask patient to sit up and rotate to a seated position at the side of the bed. Can use the bedrail. 2. Ask patient to reach out and grab your hand and shake making sure patient reaches across his/her midline. Pass- Patient is able to come to a seated position, maintain core strength. Maintains seated balance while reaching across midline. Move on to Assessment Level 2. Assessment Level 2- Stretch and Point   1. With patient in seated position at the side of the bed, have patient place both feet on the floor (or stool) with knees no higher than hips. 2. Ask patient to stretch one leg and straighten the knee, then bend the ankle/flex and point the toes. If appropriate, repeat with the other leg. Pass- Patient is able to demonstrate appropriate quad strength on intended weight bearing limb(s). Move onto Assessment Level 3. Assessment Level 3- Stand   1. Ask patient to elevate off the bed or chair (seated to standing) using an assistive device (cane, bedrail). 2. Patient should be able to raise buttocks off be and hold for a count of five. May repeat once. Pass- Patient maintains standing stability for at least 5 seconds, proceed to assessment level 4. Assessment Level 4- Walk   1. Ask patient to march in place at bedside. 2. Then ask patient to advance step and return each foot. Some medical conditions may render a patient from stepping backwards, use your best clinical judgement.    Pass- Patient demonstrates balance while shifting weight and ability to step, takes independent steps, does not use assistive device patient is MOBILITY LEVEL 4.       Mobility Level- 4 assist x1 to bsc

## 2021-12-15 NOTE — PROGRESS NOTES
Inpatient Physical Therapy Evaluation and Treatment    Unit: [facility]  Date:  12/15/2021  Patient Name:    Samanta Powers  Admitting diagnosis:  Acute respiratory failure with hypoxia ([facility] [address] [J96.01]  COVID-19 [U07.1]  Admit Date:  12/13/2021  Precautions/Restrictions/WB Status/ Lines/ Wounds/ Oxygen: Fall risk, Bed/chair alarm, Lines -IV and Isolation Precautions: Contact and Droplet Plus - COVID  Pt has stage 4 wound on sacrum, stage 2 right hip, and a healing DTI on Left heel (as per RN's note dated 12/15/2021)    Treatment Time: 1300 - 1335  Treatment Number:  1   Timed Code Treatment Minutes: 25   Total Treatment Minutes:  35  minutes    Patient Goals for Therapy: \" Go home \"          Discharge Recommendations: Home initial 24hr assist followed by PRN assist with home PT   DME needs for discharge: pulse oximeter       Therapy recommendation for EMS Transport: can transport by wheelchair    Therapy recommendations for staff:   Supervision with use of rolling walker (RW) and gait belt for all transfers and ambulation to/from chair  to/from bathroom    History of Present Illness: H & P as per Shahriar Morales MD's note dated 12/13/2021  80 y.o. female who presented to the hospital with a chief complaint of shortness of breath. Patient is a very pleasant 80-year-old female with multiple comorbidities who preseted to the emergency department by EMS after developing acute shortness of breath today at home. Patient has a history of diastolic heart failure and mitral regurg. She follows with cardiology. She is also status post CABG, femoropopliteal bypass and stent placement earlier this year. She was in her normal state of health this morning when she developed shortness of breath. She denied any fevers chills, cough or any preceding symptoms. She thought she was volume overloaded as this is how she felt before with CHF, she also endorses this she feels this way when she has a pneumonia.   When EMS got to her sitting: Independent  Scooting in supine:  Independent    Transfer Training     Sit to stand:   Supervision  Stand to sit:   Supervision  Bed to Chair:   Supervision with use of gait belt and rolling walker (RW)    Gait gait completed as indicated below  Distance:      120 ft  Deviations (firm surface/linoleum):  decreased daja, increased EUSEBIO and forward flexed posture  Assistive Device Used:    gait belt and rolling walker (RW)  Level of Assist:    Supervision  Comment: Patient needed intermittent verbal cueing to stay in the RW EUSEBIO during ambulation. Stair Training stairs completed as indicated below  # of Steps:   1  Level of Assist:  SBA  UE Support:  bilateral  Assistive Device:  No AD  Pattern:   non-reciprocal pattern  Comments:     Activity Tolerance   Pt completed therapy session with SOB noted with ambulation but recovered to baseline within 1 min  SpO2: 94% at rest on RA, after ambulation 95% on RA  HR: 91bpm    Positioning Needs   Pt in bed, alarm set, positioned in proper neutral alignment and pressure relief provided. Call light provided and all needs within reach    Exercises Initiated  all completed bilaterally unless indicated  Ankle Pumps x 20 reps    Other  None. Patient/Family Education   Pt educated on role of inpatient PT, POC, importance of continued activity, DC recommendations, safety awareness, transfer techniques, pursed lip breathing, energy conservation, pacing activity and calling for assist with mobility. Assessment  Pt seen for Physical Therapy evaluation in acute care setting. Pt demonstrated decreased Activity tolerance, Balance, Safety and Strength as well as decreased independence with Ambulation and Transfers. Recommending Home with initial 24hr assist followed by PRN assist and home PT, pulse oximeter upon discharge as patient functioning close to baseline level and would benefit from continued therapy services    Goals : To be met in 3 visits:  1). Independent with LE Ex x 10 reps    To be met in 6 visits:  1). Supine to/from sit: Independent  2). Sit to/from stand: Independent  3). Bed to chair: Independent  4). Gait: Ambulate 150 ft.  with  Independent and use of LRAD (least restrictive assistive device)  5). Tolerate B LE exercises 3 sets of 10-15 reps  6). Ascend/descend 1 steps with SBA with use of no handrail and LRAD (least restrictive assistive device)    Rehabilitation Potential: Good  Strengths for achieving goals include:   Pt motivated, PLOF, Family Support and Pt cooperative   Barriers to achieving goals include:    No Barriers    Plan    To be seen 2-3 x / week  while in acute care setting for therapeutic exercises, bed mobility, transfers, progressive gait training, balance training, and family/patient education. Signature: Gil Byrnes MS PT, # [id number]    If patient discharges from this facility prior to next visit, this note will serve as the Discharge Summary.

## 2021-12-15 NOTE — PROGRESS NOTES
Progress Note    Admit Date:  12/13/2021    Subjective:  Ms. Jenaro Lujan was admitted to [facility] for dyspnea. Work up was consistent with acute CHF. She also tested positive for COVID 19. Patient noted oxycodone. Her present she is on room air. She has a history of CAD, peripheral artery disease, status post CABG. Patient is vaccinated but is not received a booster. With diuresis patient is alert improved. As mentionable she is not on oxygen. Continue to feel more comfortable at rest.  At the time of admission she was on 6 L. She is improved by about 50%. Objective:   BP (!) 98/48   Pulse 75   Temp 97.9 °F (36.6 °C) (Oral)   Resp 16   Ht 5' 9\" (1.753 m)   Wt 135 lb (61.2 kg)   SpO2 92%   BMI 19.94 kg/m²        Intake/Output Summary (Last 24 hours) at 12/15/2021 1145  Last data filed at 12/15/2021 0837  Gross per 24 hour   Intake 120 ml   Output --   Net 120 ml       Physical Exam:    General appearance: alert, appears stated age and cooperative  Head: Normocephalic, without obvious abnormality, atraumatic  Eyes: conjunctivae/corneas clear. PERRL, EOM's intact. Neck: no adenopathy, no carotid bruit, no JVD, supple, symmetrical, trachea midline and thyroid not enlarged, symmetric, no tenderness/mass/nodules  Lungs: Few bibasilar crackles. Minimal accessory muscle use.   Heart: regular rate and rhythm, S1, S2 normal, no murmur, click, rub or gallop  Abdomen: soft, non-tender; bowel sounds normal; no masses,  no organomegaly  Extremities: extremities normal, atraumatic, no cyanosis or edema  Pulses: 2+ and symmetric  Skin: Skin color, texture, turgor normal. No rashes or lesions  Neurologic: Grossly normal    Scheduled Meds:   albuterol-ipratropium  1 puff Inhalation BID    aspirin EC  81 mg Oral Daily    atorvastatin  40 mg Oral Nightly    clopidogrel  75 mg Oral Daily    DULoxetine  60 mg Oral Daily    fluticasone  1 spray Each Nostril Daily    gabapentin  300 mg Oral BID    insulin glargine  15 Units SubCUTAneous Nightly    metoprolol succinate  12.5 mg Oral Daily    midodrine  2.5 mg Oral TID    morphine  15 mg Oral 2 times per day    sodium chloride flush  5-40 mL IntraVENous 2 times per day    enoxaparin  30 mg SubCUTAneous BID    insulin lispro  0-12 Units SubCUTAneous TID WC    insulin lispro  0-6 Units SubCUTAneous Nightly    dexamethasone  6 mg IntraVENous Q24H    remdesivir IVPB  100 mg IntraVENous Q24H    torsemide  40 mg Oral Daily    spironolactone  25 mg Oral Daily    oxyCODONE-acetaminophen  1 tablet Oral Lunch       Continuous Infusions:   dextrose      sodium chloride         PRN Meds:  albuterol sulfate HFA, cyclobenzaprine, glucose, dextrose, glucagon (rDNA), dextrose, sodium chloride flush, sodium chloride, ondansetron **OR** ondansetron, polyethylene glycol, acetaminophen **OR** acetaminophen, guaiFENesin-dextromethorphan      Data:  CBC:   Recent Labs     12/13/21  1410 12/14/21  1338 12/15/21  0542   WBC 3.8* 3.6* 4.1   HGB 11.4* 11.5* 10.3*   HCT 34.0* 34.9* 30.8*   MCV 93.8 94.3 93.9    175 175     BMP:   Recent Labs     12/13/21  1410 12/14/21  1338 12/15/21  0542    126* 132*   K 3.5 3.0* 4.0   CL 94* 83* 94*   CO2 32 26 28   BUN 18 19 34*   CREATININE 0.9 0.8 1.0     LIVER PROFILE:   Recent Labs     12/13/21  1410 12/14/21  1338 12/15/21  0542   AST 14* 12* 12*   ALT 8* 7* 6*   BILIDIR  --   --  <0.2   BILITOT 0.6 0.9 0.5   ALKPHOS 129 122 99     PT/INR:   Recent Labs     12/15/21  0542   PROTIME 11.8   INR 1.04     Cultures:   Results for Ray Carter (MRN [id number]) as of 12/15/2021 11:45   Ref.  Range 12/13/2021 14:10   INFLUENZA A Latest Ref Range: NOT DETECTED  NOT DETECTED   INFLUENZA B Latest Ref Range: NOT DETECTED  NOT DETECTED   SARS-CoV-2 RNA, RT PCR Latest Ref Range: NOT DETECTED  DETECTED (A)       Assessment:  Principal Problem:    Acute respiratory failure with hypoxia (HCC)  Active Problems:    Rheumatoid arthritis ([address] Immunocompromised state ([facility] [address]    Coronary artery disease    Type 2 diabetes mellitus with unspecified diabetic retinopathy without macular edema (HCC)    Pressure ulcer of coccygeal region, stage 4 (HCC)    Acute on chronic diastolic CHF (congestive heart failure) (HCC)    Chronic obstructive pulmonary disease ([facility] [address]    COVID-19  Resolved Problems:    * No resolved hospital problems. *      Plan:    #Acute respiratory failure with hypoxia. She was diagnosed with Covid. She was initially ordered and presently on room air. She is started on remdesivir and Decadron which will continue for now    Fluid overload ly from acute diastolic heart failure. Doing better with diuresis. #COVID-19. Droplet precautions. On Decadron and remdesivir    #COPD. Appears to be stable. Avoid HHN    #CAD she denies chest pain. #Diabetes mellitus type 2. Monitor sugars with steroids.     #Lovenox twice daily for DVT prophylaxis    [facility], MD, MD 12/15/2021 11:45 AM

## 2021-12-15 NOTE — PROGRESS NOTES
Inpatient Occupational Therapy  Evaluation and Treatment    Unit: [address] [place]  Date:  12/15/2021  Patient Name:    Mark Arvizu  Admitting diagnosis:  Acute respiratory failure with hypoxia ([facility] [J96.01]  COVID-19 [U07.1]  Admit Date:  12/13/2021  Precautions/Restrictions/WB Status/ Lines/ Wounds/ Oxygen: fall risk, IV and supplemental O2 (2L), stage 4 wound on sacrum, stage 2 right hip and healing wound on L heel     Treatment Time:  11:04-12:00  Treatment Number: 1     Billable Treatment Time:  minutes   Total Treatment Time:  56  minutes    Patient Goals for Therapy:  \" to go home \"      Discharge Recommendations: Home with 24/7 assist and home therapy  DME needs for discharge: Needs Met       Therapy recommendations for staff:   Assist of 1 with use of rolling walker (RW) for all transfers within room    History of Present Illness: 80 y.o. female with history of coronary artery disease status post MI status post CABG with mitral regurg and follows with cardiology, peripheral vascular disease status post right femoropopliteal, venous insufficiency with chronic edema, sacral decubitus ulcer chronic sees wound clinic, bronchiectasis and COPD follows with Dr. Natalie Del Valle, hypertension, hyperlipidemia, diabetes, rheumatoid arthritis, on chronic immunosuppressive medication sees Dr. Memo Santiago, osteoporosis with history of multiple fractures, chronic diastolic congestive heart failure who presents via EMS from her home for evaluation of acute onset shortness of breath and pleuritic chest pain that started this morning when she woke up. She notes that over the weekend and yesterday she felt perfectly fine, denies any ill symptoms such as congestion runny nose cough shortness of breath chest pain nausea vomiting abdominal pain back pain urinary symptoms fevers or headaches. She denies being in contact with any recent ill persons. She denies any recent changes to her medications.   She did not give herself any of her normal medications this morning she just called 911. She does not typically wear oxygen however she does note that she has oxygen at home in the event that she does require it. EMS promptly placed her on oxygen, currently requiring 6 L. She notes that she feels similar to how she has felt when she has had CHF exacerbation but she also notes it feels similar to when she has had pneumonia. She denies any cough this morning. She denies any recent weight gain or lower extremity swelling that is different for her. Home Health S4 Level Recommendation:  Level 1 Standard  AM-PAC Score:      Preadmission Environment    Pt. Lives with family (granddaughter, 24 yo, leaving for [employer] today, daughter lives up the street and another granddaughter willing to stray with pt)  Home environment:    one story home  Steps to enter first floor: 1 step to enter  Steps to second floor: N/A  Bathroom: walk in shower, comfort height toilet and grab bars, Grab bars around toilet  Equipment owned: [address] [place] [address],  (manual), quad cane, SPC, hospital bed and hemiwalker, reacher,     Preadmission Status / PLOF:  History of falls   No  Pt. Able to drive   No  Pt Fully independent with ADL's  Yes  Pt. Required assistance from family for: Independent PTA    Pt. Fully independent for transfers and gait and walked with: No Device    Pain  Yes  Rating:mild  Location:chest from coughing  Pain Medicine Status: Denies need      Cognition    A&O x4   Able to follow 2 step commands    Subjective  Patient lying supine in bed with no family present - no visitors present due to COVID-19 restrictions  Pt agreeable to this OT eval & tx. Pt very pleasant and cooperative with therapy, pt has multiple nurses in family and very good family support for plan of d/c home    Upper Extremity ROM:    WFL,  pt able to perform all bed mobility, transfers, and gait without ROM limitation.     Upper Extremity Strength:    BUE strength WFL, but not formally assessed w/ MMT    Upper Extremity Sensation    WFL    Upper Extremity Proprioception:  WFL    Coordination and Tone  WFL    Balance  Functional Sitting Balance:  WFL  Functional Standing Balance:WFL    Bed mobility:    Supine to sit:   SBA  Sit to supine:   N/A  Rolling:    Independent  Scooting in sitting:  Supervision  Scooting to head of bed:   N/A    Bridging:   N/A    Transfers:    Sit to stand:  SBA  Stand to sit:  SBA  Bed to chair:   CGA and with use of RW  Standard toilet: N/A  Bed to BSC:  N/A     Pt stood at EOB while talking for 5 mins with no LOB and minimal SOB. Pt then walk to wall and back all while O2 stayed above 90% on room air. Dressing:      UE:   N/A  LE:    SBA don pants including standing to pull up pants     Bathing:    UE:  N/A  LE:  N/A    Eating:   Independent    Toileting:  N/A    Activity Tolerance   Pt completed therapy session with SOB noted with talking   SpO2: 93% on room air after walk in room   HR: 87 after walk     Positioning Needs:   Up in chair, call light and needs in reach. Exercise / Activities Initiated:   N/A    Patient/Family Education:   Role of OT  Recommendations for DC    Assessment of Deficits: Pt seen for Occupational therapy evaluation in acute care setting. Pt demonstrated decreased Activity tolerance, ADLs, IADLs, Balance , Bathing, Bed mobility, Dressing, Safety Awareness, Strength and Transfers. Pt functioning below baseline and will likely benefit from skilled occupational therapy services to maximize safety and independence. Goal(s) : To be met in 3 Visits:  1). Bed to toilet/BSC: Supervision    To be met in 5 Visits:  1). Supine to/from Sit:  Independent  2). Upper Body Bathing:   Supervision  3). Lower Body Bathing:   Supervision  4). Upper Body Dressing:  Supervision  5). Lower Body Dressing:  Supervision  6). Pt to demonstrate UE exs x 15 reps with minimal cues    Rehabilitation Potential:  Good for goals listed above.     Strengths for achieving goals include: Pt motivated, PLOF, Family Support and Pt cooperative  Barriers to achieving goals include: Other: cough and SOB      Plan: To be seen 3-5 x/wk while in acute care setting for therapeutic exercises, bed mobility, transfers, dressing, bathing, family/patient education, ADL/IADL retraining, energy conservation training.      Gris Dunham OTR/L #[id number]            If patient discharges from this facility prior to next visit, this note will serve as the Discharge Summary

## 2021-12-16 VITALS
HEIGHT: 69 IN | SYSTOLIC BLOOD PRESSURE: 111 MMHG | DIASTOLIC BLOOD PRESSURE: 60 MMHG | WEIGHT: 128.8 LBS | TEMPERATURE: 97.5 F | RESPIRATION RATE: 18 BRPM | HEART RATE: 75 BPM | BODY MASS INDEX: 19.08 KG/M2 | OXYGEN SATURATION: 95 %

## 2021-12-16 LAB
ALBUMIN SERPL-MCNC: 2.6 G/DL (ref 3.4–5)
ALP BLD-CCNC: 92 U/L (ref 40–129)
ALT SERPL-CCNC: 7 U/L (ref 10–40)
AST SERPL-CCNC: 12 U/L (ref 15–37)
BASOPHILS ABSOLUTE: 0 K/UL (ref 0–0.2)
BASOPHILS RELATIVE PERCENT: 0.3 %
BILIRUB SERPL-MCNC: 0.3 MG/DL (ref 0–1)
BILIRUBIN DIRECT: <0.2 MG/DL (ref 0–0.3)
BILIRUBIN, INDIRECT: ABNORMAL MG/DL (ref 0–1)
C-REACTIVE PROTEIN: 21.9 MG/L (ref 0–5.1)
D DIMER: >5250 NG/ML DDU (ref 0–229)
EOSINOPHILS ABSOLUTE: 0 K/UL (ref 0–0.6)
EOSINOPHILS RELATIVE PERCENT: 0 %
GLUCOSE BLD-MCNC: 272 MG/DL (ref 70–99)
GLUCOSE BLD-MCNC: 334 MG/DL (ref 70–99)
GLUCOSE BLD-MCNC: 406 MG/DL (ref 70–99)
GLUCOSE BLD-MCNC: 448 MG/DL (ref 70–99)
GLUCOSE BLD-MCNC: 454 MG/DL (ref 70–99)
GLUCOSE BLD-MCNC: 472 MG/DL (ref 70–99)
HCT VFR BLD CALC: 31.1 % (ref 36–48)
HEMOGLOBIN: 10.2 G/DL (ref 12–16)
LYMPHOCYTES ABSOLUTE: 1.1 K/UL (ref 1–5.1)
LYMPHOCYTES RELATIVE PERCENT: 22.8 %
MCH RBC QN AUTO: 30.5 PG (ref 26–34)
MCHC RBC AUTO-ENTMCNC: 32.9 G/DL (ref 31–36)
MCV RBC AUTO: 92.9 FL (ref 80–100)
MONOCYTES ABSOLUTE: 0.5 K/UL (ref 0–1.3)
MONOCYTES RELATIVE PERCENT: 10.3 %
NEUTROPHILS ABSOLUTE: 3.2 K/UL (ref 1.7–7.7)
NEUTROPHILS RELATIVE PERCENT: 66.6 %
PDW BLD-RTO: 14.2 % (ref 12.4–15.4)
PERFORMED ON: ABNORMAL
PLATELET # BLD: 184 K/UL (ref 135–450)
PMV BLD AUTO: 7.7 FL (ref 5–10.5)
RBC # BLD: 3.35 M/UL (ref 4–5.2)
TOTAL PROTEIN: 6.2 G/DL (ref 6.4–8.2)
WBC # BLD: 4.9 K/UL (ref 4–11)

## 2021-12-16 PROCEDURE — 94761 N-INVAS EAR/PLS OXIMETRY MLT: CPT

## 2021-12-16 PROCEDURE — 94640 AIRWAY INHALATION TREATMENT: CPT

## 2021-12-16 PROCEDURE — 85025 COMPLETE CBC W/AUTO DIFF WBC: CPT

## 2021-12-16 PROCEDURE — 2500000003 HC RX 250 WO HCPCS: Performed by: INTERNAL MEDICINE

## 2021-12-16 PROCEDURE — 80076 HEPATIC FUNCTION PANEL: CPT

## 2021-12-16 PROCEDURE — 86140 C-REACTIVE PROTEIN: CPT

## 2021-12-16 PROCEDURE — 99239 HOSP IP/OBS DSCHRG MGMT >30: CPT | Performed by: INTERNAL MEDICINE

## 2021-12-16 PROCEDURE — 6370000000 HC RX 637 (ALT 250 FOR IP): Performed by: INTERNAL MEDICINE

## 2021-12-16 PROCEDURE — 6370000000 HC RX 637 (ALT 250 FOR IP): Performed by: PHYSICIAN ASSISTANT

## 2021-12-16 PROCEDURE — 2700000000 HC OXYGEN THERAPY PER DAY

## 2021-12-16 PROCEDURE — 36415 COLL VENOUS BLD VENIPUNCTURE: CPT

## 2021-12-16 PROCEDURE — 6360000002 HC RX W HCPCS: Performed by: INTERNAL MEDICINE

## 2021-12-16 PROCEDURE — 2580000003 HC RX 258: Performed by: INTERNAL MEDICINE

## 2021-12-16 PROCEDURE — 85379 FIBRIN DEGRADATION QUANT: CPT

## 2021-12-16 RX ORDER — PANTOPRAZOLE SODIUM 40 MG/1
40 TABLET, DELAYED RELEASE ORAL
Status: DISCONTINUED | OUTPATIENT
Start: 2021-12-16 | End: 2021-12-16 | Stop reason: HOSPADM

## 2021-12-16 RX ORDER — DEXAMETHASONE 6 MG/1
6 TABLET ORAL
Qty: 7 TABLET | Refills: 0 | Status: ON HOLD | OUTPATIENT
Start: 2021-12-16 | End: 2021-12-28 | Stop reason: HOSPADM

## 2021-12-16 RX ADMIN — REMDESIVIR 100 MG: 100 INJECTION, POWDER, LYOPHILIZED, FOR SOLUTION INTRAVENOUS at 09:34

## 2021-12-16 RX ADMIN — CLOPIDOGREL BISULFATE 75 MG: 75 TABLET ORAL at 09:29

## 2021-12-16 RX ADMIN — SODIUM CHLORIDE 25 ML: 9 INJECTION, SOLUTION INTRAVENOUS at 09:27

## 2021-12-16 RX ADMIN — PANTOPRAZOLE SODIUM 40 MG: 40 TABLET, DELAYED RELEASE ORAL at 13:53

## 2021-12-16 RX ADMIN — MIDODRINE HYDROCHLORIDE 2.5 MG: 5 TABLET ORAL at 13:53

## 2021-12-16 RX ADMIN — MORPHINE SULFATE 15 MG: 15 TABLET, EXTENDED RELEASE ORAL at 09:29

## 2021-12-16 RX ADMIN — METOPROLOL SUCCINATE 12.5 MG: 25 TABLET, EXTENDED RELEASE ORAL at 09:29

## 2021-12-16 RX ADMIN — GABAPENTIN 300 MG: 300 CAPSULE ORAL at 09:28

## 2021-12-16 RX ADMIN — INSULIN LISPRO 6 UNITS: 100 INJECTION, SOLUTION INTRAVENOUS; SUBCUTANEOUS at 09:38

## 2021-12-16 RX ADMIN — MIDODRINE HYDROCHLORIDE 2.5 MG: 5 TABLET ORAL at 09:29

## 2021-12-16 RX ADMIN — ASPIRIN 81 MG: 81 TABLET, COATED ORAL at 09:28

## 2021-12-16 RX ADMIN — TORSEMIDE 40 MG: 20 TABLET ORAL at 09:28

## 2021-12-16 RX ADMIN — ENOXAPARIN SODIUM 30 MG: 100 INJECTION SUBCUTANEOUS at 09:30

## 2021-12-16 RX ADMIN — Medication: at 13:54

## 2021-12-16 RX ADMIN — DULOXETINE HYDROCHLORIDE 60 MG: 60 CAPSULE, DELAYED RELEASE ORAL at 09:29

## 2021-12-16 RX ADMIN — DEXAMETHASONE SODIUM PHOSPHATE 6 MG: 10 INJECTION INTRAMUSCULAR; INTRAVENOUS at 06:37

## 2021-12-16 RX ADMIN — OXYCODONE AND ACETAMINOPHEN 1 TABLET: 10; 325 TABLET ORAL at 13:53

## 2021-12-16 RX ADMIN — SPIRONOLACTONE 25 MG: 25 TABLET ORAL at 09:29

## 2021-12-16 RX ADMIN — INSULIN LISPRO 12 UNITS: 100 INJECTION, SOLUTION INTRAVENOUS; SUBCUTANEOUS at 12:14

## 2021-12-16 ASSESSMENT — PAIN DESCRIPTION - PAIN TYPE: TYPE: CHRONIC PAIN

## 2021-12-16 ASSESSMENT — PAIN DESCRIPTION - FREQUENCY: FREQUENCY: CONTINUOUS

## 2021-12-16 ASSESSMENT — PAIN SCALES - GENERAL
PAINLEVEL_OUTOF10: 7
PAINLEVEL_OUTOF10: 8

## 2021-12-16 ASSESSMENT — PAIN DESCRIPTION - LOCATION: LOCATION: BACK

## 2021-12-16 ASSESSMENT — PAIN DESCRIPTION - DESCRIPTORS: DESCRIPTORS: ACHING

## 2021-12-16 NOTE — CONSULTS
[facility] Nurse  Consult Note       NAME:  Francia Garibay  MEDICAL RECORD NUMBER:  [id number]  AGE: 80 y.o. GENDER: female  : 1950  TODAY'S DATE:  2021    Subjective pt is alert oriented and pleasant. Reason for WOCN Evaluation and Assessment: Stage 4 pressure injury,  left heel callous, right hip stage 2.  pressure injuries present on admission      Francia Garibay is a 80 y.o. female referred by:   [x] Physician  [x] Nursing  [] Other:     Wound Identification:  Wound Type: pressure  Contributing Factors: chronic pressure and decreased mobility    Pt seen for wound care to the right hip, sacrum, and left heel, present on admission. Pt is followed by Dr Justin Torres weekly. Sacral pressure injury present since  with some improvement lately per pt. Area to left hip is new per pt, Dr Justin Torres unaware of this new area. Likely D/C today back home per Dr Lillian Velarde at bedside. Sacral wound visualizes by Dr Naomie Snowden.  Current dressing in place since Tuesday per pt. Sacrum being treated per dr olson using collagen, zinc, gauze and foam dressing. Home care to come to pt's home tomorrow per pt.      Patient Goal of Care:  [x] Wound Healing  [] Odor Control  [] Palliative Care  [] Pain Control   [] Other:         PAST MEDICAL HISTORY        Diagnosis Date    Acute on chronic diastolic heart failure due to coronary artery disease (HCC)     Acute respiratory failure with hypoxia ([facility])     Atherosclerosis of native artery of right lower extremity with rest pain ([address] 2017    Back pain     Branch retinal vein occlusion 2012    Bronchiectasis with acute exacerbation ([facility])     Cellulitis and abscess of left leg 2021    Cellulitis of left lower extremity 2021    S/P vein harvest 2021 for CABG    CHF (congestive heart failure) ([facility])     Closed compression fracture of thoracic vertebra ([address] 01/15/2020    Closed fracture of facial bone with routine healing 11/21/2016    Closed jaw fracture ([address] 01/15/2020    Community acquired pneumonia of left lower lobe of lung     Compression fracture of L1 lumbar vertebra ([address] 01/15/2020    COPD (chronic obstructive pulmonary disease) (HCC)     Fracture of tibial plateau, closed, left, initial encounter 12/05/2017    Minimally displaced zone I fracture of sacrum (HCC) 09/02/2020    MRSA (methicillin resistant staph aureus) culture positive 07/2021    MRSA (methicillin resistant Staphylococcus aureus) 07/13/2021    wound    Mucus plugging of bronchi     NSTEMI (non-ST elevated myocardial infarction) ([address] 4/23/2021    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 Bone density test last year showed severe osteoporosis. Recently, two broken vertebrae (L1, L2) due to coughing. Diagnosed with tracheomalacia and stated she must cough very hard to clear phlegm. Was coughing due to upper respiratory infections which have been treated. Hx of laminectomy and recent kyphoplasty.    Refractured her jawbone which was previously repaired wi    Post herpetic neuralgia     Proximal humerus fracture 10/01/2019    Rheumatoid arthritis ([address]     Shingles 05/2020    Sleep apnea     Status post incision and drainage 07/2021    Left Leg    Surgical wound dehiscence, initial encounter (at [facility] harvest site) 7/12/2021    Temporal arteritis ([address] 07/10/2013    Tobacco use 10/19/2017    Tracheomalacia     Vitreous hemorrhage, right eye ([address] 02/21/2020       PAST SURGICAL HISTORY    Past Surgical History:   Procedure Laterality Date    ARTERY BIOPSY Right 03/01/2021    at [address]  05/30/2013    left temporal artery biopsy    BACK SURGERY  08/2020    BRONCHOSCOPY      BRONCHOSCOPY N/A 06/12/2019    BRONCHOSCOPY ALVEOLAR LAVAGE performed by Beka Alexandre MD at [address]  2021    CATARACT REMOVAL      COLONOSCOPY      CORONARY ARTERY BYPASS GRAFT N/A 2021    CORONARY ARTERY BYPASS X3 WITH LEFT ATRIAL APPENDAGE CLIP, 5 LEVEL BILATERAL INTERCOSTAL NERVE BLOCK, STERNAL PLATING performed by Wilfred Landry MD at [address]      IR MIDLINE CATH  2021    IR MIDLINE CATH 2021 [facility] SPECIAL PROCEDURES    KNEE ARTHROSCOPY      left    KYPHOSIS SURGERY      LAMINECTOMY      LEG SURGERY Left 2021    INCISION AND DEBRIDEMENT LEFT LOWER LEG WOUND WITH POSSIBLE WOUND VAC PLACEMENT performed by Acacia Green MD at [address] [facility]  2020    MEDIASTINOSCOPY N/A 2021    MEDIASTINAL EXPLORATION AND EVACUATION OF HEMATOMA performed by Wilfred Landry MD at [address] [facility]  2021    sacral wound debridement    PRESSURE ULCER DEBRIDEMENT N/A 2021    SACRAL WOUND DEBRIDEMENT performed by Yaakov Gonsales MD at [address] [facility] [address] [address] SEPTOPLASTY  2013    FESS with balloon    SPINAL FUSION      TRANSESOPHAGEAL ECHOCARDIOGRAM  2021    TUBAL LIGATION      UPPER GASTROINTESTINAL ENDOSCOPY  2014    Dilitation       FAMILY HISTORY    Family History   Problem Relation Age of Onset    Diabetes Mother     Hypertension Mother     Asthma Other     Heart Disease Brother     Diabetes Brother     Diabetes Sister     Heart Disease Brother     Cancer Neg Hx     Emphysema Neg Hx     Heart Failure Neg Hx        SOCIAL HISTORY    Social History     Tobacco Use    Smoking status: Former Smoker     Packs/day: 1.00     Years: 20.00     Pack years: 20.00     Types: Cigarettes     Quit date: 1991     Years since quittin.7    Smokeless tobacco: Never Used   Vaping Use    Vaping Use: Never used   Substance Use Topics    Alcohol use: Not Currently Alcohol/week: 0.0 standard drinks     Comment: rarely    Drug use: No       ALLERGIES    Allergies   Allergen Reactions    Atenolol Other (See Comments)     Cough         MEDICATIONS    No current facility-administered medications on file prior to encounter. Current Outpatient Medications on File Prior to Encounter   Medication Sig Dispense Refill    etanercept (ENBREL) 50 MG/ML injection Inject 25 mg into the skin once Once weekly on Wednesday      torsemide (DEMADEX) 20 MG tablet Take 2 tablets by mouth daily If weight 131 lbs or less, decrease to 20 mg daily 60 tablet 5    metoprolol succinate (TOPROL XL) 25 MG extended release tablet Take 0.5 tablets by mouth daily 45 tablet 3    spironolactone (ALDACTONE) 25 MG tablet Take 1 tablet by mouth daily 90 tablet 3    midodrine (PROAMATINE) 2.5 MG tablet Take 1 tablet by mouth 3 times daily 270 tablet 3    DULoxetine (CYMBALTA) 60 MG extended release capsule Take 60 mg by mouth daily      DALIRESP 500 MCG tablet TAKE 1 TABLET BY MOUTH DAILY 30 tablet 11    clopidogrel (PLAVIX) 75 MG tablet Take 1 tablet by mouth daily 30 tablet 11    predniSONE (DELTASONE) 1 MG tablet Take 4 mg by mouth daily      insulin glargine (LANTUS) 100 UNIT/ML injection vial Inject 15 Units into the skin nightly 1 vial 0    oxyCODONE-acetaminophen (PERCOCET)  MG per tablet Take 1 tablet by mouth daily.  docusate sodium (COLACE) 100 MG capsule Take 100 mg by mouth 2 times daily as needed for Constipation      gabapentin (NEURONTIN) 300 MG capsule Take 300 mg by mouth 2 times daily.  latanoprost (XALATAN) 0.005 % ophthalmic solution Place 1 drop into both eyes nightly      Teriparatide, Recombinant, (FORTEO) 600 MCG/2.4ML SOPN injection Inject 20 mcg into the skin daily      NARCAN 4 MG/0.1ML LIQD nasal spray as needed       morphine (MS CONTIN) 15 MG extended release tablet 15 mg 2 times daily.        ipratropium (ATROVENT) 0.06 % nasal spray USE 2 SPRAYS Cylindrical bronchiectasis ([facility] J47.9    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    Immunocompromised state ([facility] D84.9    Coronary artery disease I25.10    Bilateral lower extremity edema R60.0    Essential hypertension I10    Lumbar spondylosis M47.816    Mitral valve insufficiency and aortic valve insufficiency I08.0    Mixed hyperlipidemia E78.2    Myopia of both eyes H52.13    Osteoporosis M81.0    Other chronic sinusitis J32.8    Primary open angle glaucoma (POAG) of both eyes, mild stage H40.1131    Primary osteoarthritis of right hip M16.11    Type 2 diabetes mellitus with unspecified diabetic retinopathy without macular edema ([facility]) E11.319    Type 2 diabetes mellitus with diabetic peripheral angiopathy without gangrene, with long-term current use of insulin ([facility]) E11.51, Z79.4    Pressure ulcer of coccygeal region, stage 4 ([facility]) L89.154    Hx of CABG Z95.1    Mild malnutrition ([facility]) E44.1    Acute on chronic diastolic CHF (congestive heart failure) ([facility]) I50.33    Chronic obstructive pulmonary disease ([facility]) J44.9    Nonrheumatic mitral (valve) insufficiency I34.0    Acute respiratory failure with hypoxia ([facility]) J96.01    COVID-19 U07.1       Measurements:  Wound 12/18/20 #2, Sacrum, Pressure Injury, Stage 4, Onset 5/2020 (Active)   Wound Image   12/01/21 0925   Wound Etiology Pressure Stage  4 12/01/21 0925   Dressing Status New dressing applied; Clean; Dry; Intact 12/01/21 1020   Wound Cleansed Vashe 12/01/21 1020   Dressing/Treatment Other (comment) 12/01/21 1020   Wound Length (cm) 2.8 cm 12/01/21 0925   Wound Width (cm) 1 cm 12/01/21 0925   Wound Depth (cm) 1.1 cm 12/01/21 0925   Wound Surface Area (cm^2) 2.8 cm^2 12/01/21 0925   Change in Wound Size % (l*w) -1766.67 12/01/21 0925   Wound Volume (cm^3) 3.08 cm^3 12/01/21 0925   Wound Healing % -3322 12/01/21 0925   Post-Procedure Length (cm) 2.8 cm 12/01/21 0957   Post-Procedure Width (cm) 1 cm 12/01/21 0957   Post-Procedure Depth (cm) 1.1 cm 12/01/21 0957   Post-Procedure Surface Area (cm^2) 2.8 cm^2 12/01/21 0957   Post-Procedure Volume (cm^3) 3.08 cm^3 12/01/21 0957   Distance Tunneling (cm) 0 cm 12/01/21 0957   Undermining Starts ___ O'Clock 6 12/01/21 0957   Undermining Ends___ O'Clock 11 12/01/21 0957   Undermining Maxium Distance (cm) 2 12/01/21 0957   Wound Assessment Pink/red 12/01/21 0925   Drainage Amount Small 12/01/21 0925   Drainage Description Serous 12/01/21 0925   Odor None 12/01/21 0925   Blanca-wound Assessment Maceration 12/01/21 0925   Number of days: 363      Right hip:  0.5x1. 1x0.1cm, 100% shallow pink with surrounding maturation tissue. left medial heel:  1.4x2.1cm, not open, Calloused, per pt has seen podiatry in the past who would shave off the callous each visit. Sacral wound photo did not save:  Measures 5x3x1.1 undermining 6-11 1.7cm, edges white and macerated. periwound skin with blanchable redness and MASD      Response to treatment:  Well tolerated by patient. Pain Assessment:  Severity:  0 / 10  Quality of pain: N/A  Wound Pain Timing/Severity: none  Premedicated: N/A    Plan  Sacrum:  Cleanse with NS, pat dry. Fill with alginate ag and cover with foam dressing, change every other day. Left heel:  Foam dressing, change every 3 days and prn  Right hip:  Alginate ag and foam, change every 3 days and prn. Offload   Plan of Care:      Specialty Bed Required : No   [] Low Air Loss   [] Pressure Redistribution  [] Fluid Immersion  [] Bariatric  [] Total Pressure Relief  [] Other:     Current Diet: ADULT DIET;  Regular; 3 carb choices (45 gm/meal)  Dietician consult:  Yes    Discharge Plan:  Placement for patient upon discharge: home with support    Patient appropriate for Outpatient [address]: Yes-see Dr olson    Referrals:  [x]   [x] [address] [facility]  [] Supplies  [] Other    Patient/Caregiver Teaching: Pt to follow up with Dr Salud Rock  Level of patient/caregiver understanding able to:   []

## 2021-12-16 NOTE — CARE COORDINATION
[facility]  Noted plan for discharge home today. Sent request to [facility] for [facility] coverage. Spoke with Liliana NELSON in follow up. Pt is active with [facility] JIN. Liaison will no longer be following for [facility]. needs.           Electronically signed by Shaunna Saldana RN on 12/16/2021 at 11:03 AM

## 2021-12-16 NOTE — PLAN OF CARE
Problem: Skin Integrity:  Goal: Will show no infection signs and symptoms  Description: Will show no infection signs and symptoms  12/16/2021 1525 by Oleksandr Luevano RN  Outcome: Completed  12/16/2021 0412 by Alicia Matthew RN  Outcome: Ongoing  Goal: Absence of new skin breakdown  Description: Absence of new skin breakdown  12/16/2021 1525 by Oleksandr Luevano RN  Outcome: Completed  12/16/2021 0412 by Alicia Matthew RN  Outcome: Ongoing     Problem: Airway Clearance - Ineffective  Goal: Achieve or maintain patent airway  12/16/2021 1525 by Oleksandr Luevano RN  Outcome: Completed  12/16/2021 0412 by Alicia Matthew RN  Outcome: Ongoing     Problem: Gas Exchange - Impaired  Goal: Absence of hypoxia  12/16/2021 1525 by Oleksandr Luevano RN  Outcome: Completed  12/16/2021 0412 by Alicia Matthew RN  Outcome: Ongoing  Goal: Promote optimal lung function  12/16/2021 1525 by Oleksandr Luevano RN  Outcome: Completed  12/16/2021 0412 by Alicia Matthew RN  Outcome: Ongoing     Problem: Breathing Pattern - Ineffective  Goal: Ability to achieve and maintain a regular respiratory rate  12/16/2021 1525 by Oleksandr Luevano RN  Outcome: Completed  12/16/2021 0412 by Alicia Matthew RN  Outcome: Ongoing     Problem:  Body Temperature -  Risk of, Imbalanced  Goal: Ability to maintain a body temperature within defined limits  12/16/2021 1525 by Oleksandr Luevano RN  Outcome: Completed  12/16/2021 0412 by Alicia Matthew RN  Outcome: Ongoing  Goal: Will regain or maintain usual level of consciousness  12/16/2021 1525 by Oleksandr Luevano RN  Outcome: Completed  12/16/2021 0412 by Alicia Matthew RN  Outcome: Ongoing  Goal: Complications related to the disease process, condition or treatment will be avoided or minimized  12/16/2021 1525 by Oleksandr Luevano RN  Outcome: Completed  12/16/2021 0412 by Alicia Matthew RN  Outcome: Ongoing     Problem: Isolation Precautions - Risk of Spread of Infection  Goal: Prevent transmission of infection  12/16/2021 1525 by Flora Mccabe RN  Outcome: Completed  12/16/2021 0412 by Eugenie Bowman RN  Outcome: Ongoing     Problem: Nutrition Deficits  Goal: Optimize nutritional status  12/16/2021 1525 by Flora Mccabe RN  Outcome: Completed  12/16/2021 0412 by Eugenie Bowman RN  Outcome: Ongoing     Problem: Risk for Fluid Volume Deficit  Goal: Maintain normal heart rhythm  12/16/2021 1525 by Flora Mccabe RN  Outcome: Completed  12/16/2021 0412 by Eugenie Bowman RN  Outcome: Ongoing  Goal: Maintain absence of muscle cramping  12/16/2021 1525 by Flora Mccabe RN  Outcome: Completed  12/16/2021 0412 by Eugenie Bowman RN  Outcome: Ongoing  Goal: Maintain normal serum potassium, sodium, calcium, phosphorus, and pH  12/16/2021 1525 by Flora Mccabe RN  Outcome: Completed  12/16/2021 0412 by Eugenie Bowman RN  Outcome: Ongoing     Problem: Loneliness or Risk for Loneliness  Goal: Demonstrate positive use of time alone when socialization is not possible  12/16/2021 1525 by Flora Mccabe RN  Outcome: Completed  12/16/2021 0412 by Eugenie Bowmna RN  Outcome: Ongoing     Problem: Fatigue  Goal: Verbalize increase energy and improved vitality  12/16/2021 1525 by Flora Mccabe RN  Outcome: Completed  12/16/2021 0412 by Eugenie Bowman RN  Outcome: Ongoing     Problem: Patient Education: Go to Patient Education Activity  Goal: Patient/Family Education  12/16/2021 1525 by Flora Mccabe RN  Outcome: Completed  12/16/2021 0412 by Eugenie Bowman RN  Outcome: Ongoing     Problem: Pain:  Description: Pain management should include both nonpharmacologic and pharmacologic interventions.   Goal: Pain level will decrease  Description: Pain level will decrease  12/16/2021 1525 by Flora cMcabe RN  Outcome: Completed  12/16/2021 0412 by Eugenie Bowman RN  Outcome: Ongoing  Goal: Control of acute pain  Description: Control of acute pain  12/16/2021 1525 by Flora Mccabe RN  Outcome: Completed  12/16/2021 0412 by Eugenie Bowman RN  Outcome: Ongoing  Goal: Control of chronic pain  Description: Control of chronic pain  12/16/2021 1525 by Faisal Bruno RN  Outcome: Completed  12/16/2021 0412 by Tanika Huddleston RN  Outcome: Ongoing     Problem: Falls - Risk of:  Goal: Will remain free from falls  Description: Will remain free from falls  12/16/2021 1525 by Faisal Bruno RN  Outcome: Completed  12/16/2021 0412 by Tanika Huddleston RN  Outcome: Ongoing  Goal: Absence of physical injury  Description: Absence of physical injury  12/16/2021 1525 by Faisal Bruno RN  Outcome: Completed  12/16/2021 0412 by Tanika Huddleston RN  Outcome: Ongoing     Problem: Nutrition  Goal: Optimal nutrition therapy  12/16/2021 1525 by Faisal Bruno RN  Outcome: Completed  12/16/2021 0412 by Tanika Huddleston RN  Outcome: Ongoing

## 2021-12-16 NOTE — PROGRESS NOTES
Discharge instructions given, pulse oximeter provided, oxygen tank provided, oxygen in working order @ 1.5lnc. pt denies questions/ needs at this time. Pt transported via wheelchair to vehicle with daughter for discharge home. David Blake RN\

## 2021-12-16 NOTE — CARE COORDINATION
[facility] DISCHARGE ORDER  Date/Time 2021 12:32 PM  Completed by: Dale Esposito RN, Case Management    Patient Name: Desire Granado      : 1950  Admitting Diagnosis: Acute respiratory failure with hypoxia ([facility] [J96.01]  COVID-19 [U07.1]      Admit order Date and Status:21  (verify MD's last order for status of admission)      Noted discharge order. If applicable PT/OT recommendation at Discharge: home with 24/7 initially and then prn assist, and HHC PT/OT/SN  DME recommendation by PT/OT: N/A  Confirmed discharge plan: Yes  with Pt  If pt confirmed DC plan does family need to be contacted by CM No  Discharge Plan: home with MICHAEL. States Dtr will either stay with her or she will stay with her Dtr for 24 hour initially. Pt is active with Delvin Yoo at this time. JONES orders placed. Reviewed chart. Role of discharge planner explained and patient verbalized understanding. Discharge order is noted. Has Home O2 in place on admit:  Yes  Informed of need to bring portable home O2 tank on day of discharge for nursing to connect prior to leaving:   Yes  Verbalized agreement/Understanding:   Yes  Pt is being d/c'd to 97 today. Pt's O2 sats are 97% on 1lpm.    Discharge timeout done with all disciplines. All discharge needs and concerns addressed.

## 2021-12-16 NOTE — PLAN OF CARE
Problem: Skin Integrity:  Goal: Will show no infection signs and symptoms  Description: Will show no infection signs and symptoms  Outcome: Ongoing  Goal: Absence of new skin breakdown  Description: Absence of new skin breakdown  Outcome: Ongoing     Problem: Airway Clearance - Ineffective  Goal: Achieve or maintain patent airway  Outcome: Ongoing     Problem: Gas Exchange - Impaired  Goal: Absence of hypoxia  Outcome: Ongoing  Goal: Promote optimal lung function  Outcome: Ongoing     Problem: Breathing Pattern - Ineffective  Goal: Ability to achieve and maintain a regular respiratory rate  Outcome: Ongoing     Problem:  Body Temperature -  Risk of, Imbalanced  Goal: Ability to maintain a body temperature within defined limits  Outcome: Ongoing  Goal: Will regain or maintain usual level of consciousness  Outcome: Ongoing  Goal: Complications related to the disease process, condition or treatment will be avoided or minimized  Outcome: Ongoing     Problem: Isolation Precautions - Risk of Spread of Infection  Goal: Prevent transmission of infection  Outcome: Ongoing     Problem: Nutrition Deficits  Goal: Optimize nutritional status  Outcome: Ongoing     Problem: Risk for Fluid Volume Deficit  Goal: Maintain normal heart rhythm  Outcome: Ongoing  Goal: Maintain absence of muscle cramping  Outcome: Ongoing  Goal: Maintain normal serum potassium, sodium, calcium, phosphorus, and pH  Outcome: Ongoing     Problem: Loneliness or Risk for Loneliness  Goal: Demonstrate positive use of time alone when socialization is not possible  Outcome: Ongoing     Problem: Fatigue  Goal: Verbalize increase energy and improved vitality  Outcome: Ongoing     Problem: Patient Education: Go to Patient Education Activity  Goal: Patient/Family Education  Outcome: Ongoing     Problem: Pain:  Goal: Pain level will decrease  Description: Pain level will decrease  Outcome: Ongoing  Goal: Control of acute pain  Description: Control of acute pain  Outcome: Ongoing  Goal: Control of chronic pain  Description: Control of chronic pain  Outcome: Ongoing     Problem: Falls - Risk of:  Goal: Will remain free from falls  Description: Will remain free from falls  Outcome: Ongoing  Goal: Absence of physical injury  Description: Absence of physical injury  Outcome: Ongoing     Problem: Nutrition  Goal: Optimal nutrition therapy  12/16/2021 0412 by Ling Gonsalez RN  Outcome: Ongoing  12/15/2021 1523 by Alvino Nissen, RD, LD  Outcome: Ongoing

## 2021-12-16 NOTE — PROGRESS NOTES
Pts 2am , perfect served Dr. Galicia Mo received order to give 12 units humalog insulin.  Cosme Horan RN

## 2021-12-16 NOTE — DISCHARGE SUMMARY
Name:  Stephan Borges  Room:  0209/0209-02  MRN:    [id number]    Discharge Summary      This discharge summary is in conjunction with a complete physical exam done on the day of discharge. Discharging Physician: Ishan Bond MD      Admit: 12/13/2021  Discharge:   12/16/2021     Diagnoses this Admission    Principal Problem:    Acute respiratory failure with hypoxia ([address]  Active Problems:    Rheumatoid arthritis ([address]    Immunocompromised state ([address]    Coronary artery disease    Type 2 diabetes mellitus with unspecified diabetic retinopathy without macular edema (HCC)    Pressure ulcer of coccygeal region, stage 4 (HCC)    Acute on chronic diastolic CHF (congestive heart failure) (HCC)    Chronic obstructive pulmonary disease ([address]    COVID-19  Resolved Problems:    * No resolved hospital problems. *      Procedures (Please Review Full Report for Details)  None     Consults    IP CONSULT TO HOSPITALIST  IP CONSULT TO PULMONOLOGY  IP CONSULT TO PHARMACY      HPI:    The patient is a 80 y.o. female with COPD, CHF, bronchiectasis, DANIELLE, COVID 19 tested + 12/13/21 and admitted to [facility] for hypoxia 2/2 COVID 19 and CHF discharged to home on 12/16 who presented to the ED with complaint of shortness of breath. Patient states that she did well for a couple of days after discharge but then started getting increasing dyspnea. Associated with cough and some sputum production. No fevers. O2 sats dropped to 79% at home. Patient presented to the ER. Readmitted for hypoxemia. Required 6 L of oxygen on presentation. Currently O2 weaned back to 2 L. Work-up suggest possible superimposed pneumonia patient started on broad-spectrum IV antibiotics. She is feeling better today.     She has been vaccinated for Covid.     Physical Exam at Discharge:  /60   Pulse 83   Temp 97.2 °F (36.2 °C) (Oral)   Resp 18   Ht 5' 9\" (1.753 m)   Wt 128 lb 12.8 oz (58.4 kg)   SpO2 97%   BMI 19.02 kg/m²     General appearance: alert, appears stated age and cooperative  Head: Normocephalic, without obvious abnormality, atraumatic  Eyes: conjunctivae/corneas clear. PERRL, EOM's intact. Neck: no adenopathy, no carotid bruit, no JVD, supple, symmetrical, trachea midline and thyroid not enlarged, symmetric, no tenderness/mass/nodules  Lungs: Few bibasilar crackles. no accessory muscle use. Heart: regular rate and rhythm, S1, S2 normal, no murmur, click, rub or gallop  Abdomen: soft, non-tender; bowel sounds normal; no masses,  no organomegaly  Extremities: extremities normal, atraumatic, no cyanosis or edema  Pulses: 2+ and symmetric  Skin: Skin color, texture, turgor normal. No rashes or lesions  Neurologic: Grossly normal      Hospital Course  #Acute respiratory failure with hypoxia. She was diagnosed with Covid. She was initially ordered and presently on room air. She is started on remdesivir and Decadron which will continue for now. Discharged on Decadron.     #Fluid overload ly from acute diastolic heart failure. Doing better with diuresis.     #COVID-19. Droplet precautions. On Decadron and remdesivir     #COPD. Appears to be stable. Avoid HHN     #CAD she denies chest pain.     #Diabetes mellitus type 2.  Monitor sugars with steroids.     #Lovenox twice daily for DVT prophylaxis    CBC:   Recent Labs     12/14/21  1338 12/15/21  0542 12/16/21  0611   WBC 3.6* 4.1 4.9   HGB 11.5* 10.3* 10.2*   HCT 34.9* 30.8* 31.1*   MCV 94.3 93.9 92.9    175 184     BMP:   Recent Labs     12/13/21  1410 12/14/21  1338 12/15/21  0542    126* 132*   K 3.5 3.0* 4.0   CL 94* 83* 94*   CO2 32 26 28   BUN 18 19 34*   CREATININE 0.9 0.8 1.0     LIVER PROFILE:   Recent Labs     12/14/21  1338 12/15/21  0542 12/16/21  0611   AST 12* 12* 12*   ALT 7* 6* 7*   BILIDIR  --  <0.2 <0.2   BILITOT 0.9 0.5 0.3   ALKPHOS 122 99 92     PT/INR:   Recent Labs     12/15/21  0542   PROTIME 11.8   INR 1.04           CT CHEST PULMONARY EMBOLISM W CONTRAST   Final Result   No evidence of pulmonary embolism or acute pulmonary abnormality. Scarring at the right lung base         XR CHEST PORTABLE   Final Result   Bilateral pleural effusions.                 Discharge Medications     Medication List      START taking these medications    dexamethasone 6 MG tablet  Commonly known as: Decadron  Take 1 tablet by mouth daily (with breakfast) for 7 days        CONTINUE taking these medications    Acapella Misc  Take 1 Device by mouth as needed     Accu-Chek Zaira Plus strip  Generic drug: blood glucose test strips     albuterol sulfate  (90 Base) MCG/ACT inhaler  INHALE 2 PUFFS INTO THE LUNGS EVERY 4 HOURS AS NEEDED FOR WHEEZING     aspirin EC 81 MG EC tablet     atorvastatin 40 MG tablet  Commonly known as: LIPITOR     calcium carbonate 500 MG Tabs tablet  Commonly known as: OSCAL     cetirizine 10 MG tablet  Commonly known as: ZYRTEC     clopidogrel 75 MG tablet  Commonly known as: PLAVIX  Take 1 tablet by mouth daily     cyclobenzaprine 10 MG tablet  Commonly known as: FLEXERIL     Daliresp 500 MCG tablet  Generic drug: Roflumilast  TAKE 1 TABLET BY MOUTH DAILY     docusate sodium 100 MG capsule  Commonly known as: COLACE     DULoxetine 60 MG extended release capsule  Commonly known as: CYMBALTA     etanercept 50 MG/ML injection  Commonly known as: ENBREL     fluticasone 50 MCG/ACT nasal spray  Commonly known as: FLONASE  INHALE 2 SPRAYS IN EACH NOSTRIL DAILY     gabapentin 300 MG capsule  Commonly known as: NEURONTIN     insulin glargine 100 UNIT/ML injection vial  Commonly known as: Lantus  Inject 15 Units into the skin nightly     insulin lispro 100 UNIT/ML injection vial  Commonly known as: HUMALOG     Insulin Syringe-Needle U-100 31G X 5/16\" 0.5 ML Misc     ipratropium 0.06 % nasal spray  Commonly known as: ATROVENT  USE 2 SPRAYS BY NASAL ROUTE 2-4 TIMES DAILY     latanoprost 0.005 % ophthalmic solution  Commonly known as: XALATAN     metoprolol

## 2021-12-16 NOTE — PROGRESS NOTES
RT Inhaler-Nebulizer Bronchodilator Protocol Note    There is a bronchodilator order in the chart from a provider indicating to follow the RT Bronchodilator Protocol and there is an Initiate RT Inhaler-Nebulizer Bronchodilator Protocol order as well (see protocol at bottom of note). CXR Findings:  No results found. The findings from the last RT Protocol Assessment were as follows:   History Pulmonary Disease: Chronic pulmonary disease  Respiratory Pattern: Regular pattern and RR 12-20 bpm  Breath Sounds: Slightly diminished and/or crackles  Cough: Strong, spontaneous, non-productive  Indication for Bronchodilator Therapy: On home bronchodilators  Bronchodilator Assessment Score: 4    Aerosolized bronchodilator medication orders have been revised according to the RT Inhaler-Nebulizer Bronchodilator Protocol below. Respiratory Therapist to perform RT Therapy Protocol Assessment initially then follow the protocol. Repeat RT Therapy Protocol Assessment PRN for score 0-3 or on second treatment, BID, and PRN for scores above 3. No Indications - adjust the frequency to every 6 hours PRN wheezing or bronchospasm, if no treatments needed after 48 hours then discontinue using Per Protocol order mode. If indication present, adjust the RT bronchodilator orders based on the Bronchodilator Assessment Score as indicated below. Use Inhaler orders unless patient has one or more of the following: on home nebulizer, not able to hold breath for 10 seconds, is not alert and oriented, cannot activate and use MDI correctly, or respiratory rate 25 breaths per minute or more, then use the equivalent nebulizer order(s) with same Frequency and PRN reasons based on the score. If a patient is on this medication at home then do not decrease Frequency below that used at home.     0-3 - enter or revise RT bronchodilator order(s) to equivalent RT Bronchodilator order with Frequency of every 4 hours PRN for wheezing or increased work of breathing using Per Protocol order mode. 4-6 - enter or revise RT Bronchodilator order(s) to two equivalent RT bronchodilator orders with one order with BID Frequency and one order with Frequency of every 4 hours PRN wheezing or increased work of breathing using Per Protocol order mode. 7-10 - enter or revise RT Bronchodilator order(s) to two equivalent RT bronchodilator orders with one order with TID Frequency and one order with Frequency of every 4 hours PRN wheezing or increased work of breathing using Per Protocol order mode. 11-13 - enter or revise RT Bronchodilator order(s) to one equivalent RT bronchodilator order with QID Frequency and an Albuterol order with Frequency of every 4 hours PRN wheezing or increased work of breathing using Per Protocol order mode. Greater than 13 - enter or revise RT Bronchodilator order(s) to one equivalent RT bronchodilator order with every 4 hours Frequency and an Albuterol order with Frequency of every 2 hours PRN wheezing or increased work of breathing using Per Protocol order mode.      Electronically signed by Galindo Chua RCP on 12/15/2021 at 7:13 PM

## 2021-12-16 NOTE — DISCHARGE INSTR - COC
Continuity of Care Form    Patient Name: Danitza Pedraza   :  1950  MRN:  [id number]    Admit date:  2021  Discharge date:  ***    Code Status Order: Full Code   Advance Directives:   Advance Care Flowsheet Documentation       Date/Time Healthcare Directive Type of Healthcare Directive Copy in [address] Agent's Name Healthcare Agent's Phone Number    [phone] Yes, patient has an advance directive for healthcare treatment Durable power of  for health care Yes, copy in chart Adult Children -- --            Admitting Physician:  Padmini Meeks MD  PCP: Clive Roach MD    Discharging Nurse: [facility] Unit/Room#: 0209/0209-02  Discharging Unit Phone Number: ***    Emergency Contact:   Extended Emergency Contact Information  Primary Emergency Contact: [address] [place] [address] Road of [address] Phone: [phone]  Mobile Phone: [phone]  Relation: Child  [facility] of [address] Phone: [phone]  Mobile Phone: [phone]  Relation: Child    Past Surgical History:  Past Surgical History:   Procedure Laterality Date    ARTERY BIOPSY Right 2021    at [address]  2013    left temporal artery biopsy    BACK SURGERY  2020    BRONCHOSCOPY      BRONCHOSCOPY N/A 2019    BRONCHOSCOPY ALVEOLAR LAVAGE performed by Topher Armenta MD at [address]  2021    CATARACT REMOVAL      COLONOSCOPY      CORONARY ARTERY BYPASS GRAFT N/A 2021    CORONARY ARTERY BYPASS X3 WITH LEFT ATRIAL APPENDAGE CLIP, 5 LEVEL BILATERAL INTERCOSTAL NERVE BLOCK, STERNAL PLATING performed by Don Costa MD at [address] CATH  2021    IR MIDLINE CATH 2021 [facility] Final ARTHROSCOPY      left    KYPHOSIS SURGERY LAMINECTOMY      LEG SURGERY Left 7/13/2021    INCISION AND DEBRIDEMENT LEFT LOWER LEG WOUND WITH POSSIBLE WOUND VAC PLACEMENT performed by Richar Earl MD at [facility]  02/2020    MEDIASTINOSCOPY N/A 05/05/2021    MEDIASTINAL EXPLORATION AND EVACUATION OF HEMATOMA performed by Matilde Wagoner MD at [place] [address]  01/08/2021    sacral wound debridement    PRESSURE ULCER DEBRIDEMENT N/A 01/08/2021    SACRAL WOUND DEBRIDEMENT performed by Young Chaudhry MD at [address] [place] [address]  05/07/2013    FESS with balloon    SPINAL FUSION      TRANSESOPHAGEAL ECHOCARDIOGRAM  11/02/2021    TUBAL LIGATION      UPPER GASTROINTESTINAL ENDOSCOPY  04/08/2014    Dilitation       Immunization History:   Immunization History   Administered Date(s) Administered    COVID-19, Pfizer, PF, 30mcg/0.3mL 02/11/2021, 03/04/2021    Influenza Virus Vaccine 10/03/2019    Influenza, High Dose (Fluzone 65 yrs and older) 11/22/2017, 11/08/2018, 09/21/2020    PPD Test 12/02/2014    Pneumococcal Conjugate 13-valent (Upgvcip33) 11/26/2013    Pneumococcal Conjugate 7-valent (Prevnar7) 11/26/2013    Pneumococcal Polysaccharide ([id number]) 10/23/2017    Td Vaccine >6yo [facility] 01/01/2007       Active Problems:  Patient Active Problem List   Diagnosis Code    Rheumatoid arthritis ([facility] M06.9    Psoriasis L40.9    GERD (gastroesophageal reflux disease) K21.9    Anemia D64.9    Cylindrical bronchiectasis (HCC) J47.9    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    Immunocompromised state ([facility] D84.9    Coronary artery disease I25.10    Bilateral lower extremity edema R60.0    Essential hypertension I10    Lumbar spondylosis M47.816    Mitral valve insufficiency and aortic valve insufficiency I08.0    Mixed hyperlipidemia E78.2    Myopia of both eyes H52.13    Osteoporosis M81.0    Other chronic sinusitis J32.8    Primary open angle glaucoma (POAG) of both eyes, mild stage H07.5685    Primary osteoarthritis of right hip M16.11    Type 2 diabetes mellitus with unspecified diabetic retinopathy without macular edema ([facility]) E11.319    Type 2 diabetes mellitus with diabetic peripheral angiopathy without gangrene, with long-term current use of insulin ([facility]) E11.51, Z79.4    Pressure ulcer of coccygeal region, stage 4 ([facility]) L89.154    Hx of CABG Z95.1    Mild malnutrition ([facility]) E44.1    Acute on chronic diastolic CHF (congestive heart failure) ([facility]) I50.33    Chronic obstructive pulmonary disease ([facility]) J44.9    Nonrheumatic mitral (valve) insufficiency I34.0    Acute respiratory failure with hypoxia ([facility]) J96.01    COVID-19 U07.1       Isolation/Infection:   Isolation            Droplet Plus  Contact          Patient Infection Status       Infection Onset Added Last Indicated Last Indicated By Review Planned Expiration Resolved Resolved By    COVID-19 12/13/21 12/13/21 12/13/21 COVID-19 & Influenza Combo 12/20/21 12/27/21      MRSA 07/07/21 07/09/21 07/13/21 Culture, Wound        Resolved    COVID-19 (Rule Out) 12/13/21 12/13/21 12/13/21 COVID-19 & Influenza Combo (Ordered)   12/13/21 Rule-Out Test Resulted    COVID-19 (Rule Out) 10/27/21 10/27/21 10/27/21 COVID-19 (Ordered)   10/28/21 Rule-Out Test Resulted    COVID-19 (Rule Out) 08/10/21 08/10/21 08/10/21 COVID-19, Rapid (Ordered)   08/10/21 Rule-Out Test Resulted    COVID-19 (Rule Out) 04/23/21 04/23/21 04/23/21 COVID-19, Rapid (Ordered)   04/23/21 Rule-Out Test Resulted    COVID-19 (Rule Out) 01/04/21 01/04/21 01/04/21 COVID-19 (Ordered)   01/05/21 Rule-Out Test Resulted    COVID-19 (Rule Out) 06/28/20 06/28/20 06/28/20 COVID-19 (Ordered)   06/29/20 Rule-Out Test Resulted            Nurse Assessment:  Last Vital Signs: /60   Pulse 83   Temp 97.2 °F (36.2 °C) (Oral)   Resp 18   Ht 5' 9\" (1.753 m)   Wt 128 lb 12.8 oz (58.4 kg)   SpO2 97%   BMI 19.02 kg/m²     Last documented pain score (0-10 scale): Pain Level: 7  Last Weight: Wt Readings from Last 1 Encounters:   12/16/21 128 lb 12.8 oz (58.4 kg)     Mental Status:  {IP PT MENTAL STATUS:20030}    IV Access:  [address] AUTUMN IV ACCESS:[id number]}    Nursing Mobility/ADLs:  Walking   {CHP DME PUYN:874198758}  Transfer  {CHP DME HPZR:089640401}  Bathing  {CHP DME WQRO:585701318}  Dressing  {CHP DME TMJL:225595110}  Toileting  {CHP DME UWDW:840128908}  Feeding  {CHP DME ZVCU:657444505}  Med Admin  {CHP DME LHEQ:351345231}  Med Delivery   { AUTUMN MED Delivery:959288084}    Wound Care Documentation and Therapy:  Wound 12/18/20 #2, Sacrum, Pressure Injury, Stage 4, Onset 5/2020 (Active)   Wound Image   12/01/21 0925   Wound Etiology Pressure Stage  4 12/01/21 0925   Dressing Status New dressing applied; Clean; Dry; Intact 12/01/21 1020   Wound Cleansed Vashe 12/01/21 1020   Dressing/Treatment Other (comment) 12/01/21 1020   Wound Length (cm) 2.8 cm 12/01/21 0925   Wound Width (cm) 1 cm 12/01/21 0925   Wound Depth (cm) 1.1 cm 12/01/21 0925   Wound Surface Area (cm^2) 2.8 cm^2 12/01/21 0925   Change in Wound Size % (l*w) -1766.67 12/01/21 0925   Wound Volume (cm^3) 3.08 cm^3 12/01/21 0925   Wound Healing % -3322 12/01/21 0925   Post-Procedure Length (cm) 2.8 cm 12/01/21 0957   Post-Procedure Width (cm) 1 cm 12/01/21 0957   Post-Procedure Depth (cm) 1.1 cm 12/01/21 0957   Post-Procedure Surface Area (cm^2) 2.8 cm^2 12/01/21 0957   Post-Procedure Volume (cm^3) 3.08 cm^3 12/01/21 0957   Distance Tunneling (cm) 0 cm 12/01/21 0957   Undermining Starts ___ O'Clock 6 12/01/21 0957   Undermining Ends___ O'Clock 11 12/01/21 0957   Undermining Maxium Distance (cm) 2 12/01/21 0957   Wound Assessment Pink/red 12/01/21 0925   Drainage Amount Small 12/01/21 0925   Drainage Description Serous 12/01/21 0925   Odor None 12/01/21 0925   Blanca-wound Assessment Maceration 12/01/21 0925   Number of days: 363        Elimination:  Continence:    Bowel: {YES / IH:76788}  Bladder: {YES / YH:36665}  Urinary Catheter: {Urinary ***    Physician Certification: I certify the above information and transfer of Gonzalo Rosado  is necessary for the continuing treatment of the diagnosis listed and that she requires [address] for less 30 days.      Update Admission H&P: {CHP DME Changes in YFRD:147495967}    PHYSICIAN SIGNATURE:  Electronically signed by VO Dr. Eleni Calle, RN on 12/16/21 at 12:40 PM EST

## 2021-12-16 NOTE — PROGRESS NOTES
for wheezing or increased work of breathing using Per Protocol order mode. 4-6 - enter or revise RT Bronchodilator order(s) to two equivalent RT bronchodilator orders with one order with BID Frequency and one order with Frequency of every 4 hours PRN wheezing or increased work of breathing using Per Protocol order mode. 7-10 - enter or revise RT Bronchodilator order(s) to two equivalent RT bronchodilator orders with one order with TID Frequency and one order with Frequency of every 4 hours PRN wheezing or increased work of breathing using Per Protocol order mode. 11-13 - enter or revise RT Bronchodilator order(s) to one equivalent RT bronchodilator order with QID Frequency and an Albuterol order with Frequency of every 4 hours PRN wheezing or increased work of breathing using Per Protocol order mode. Greater than 13 - enter or revise RT Bronchodilator order(s) to one equivalent RT bronchodilator order with every 4 hours Frequency and an Albuterol order with Frequency of every 2 hours PRN wheezing or increased work of breathing using Per Protocol order mode. RT to enter RT Home Evaluation for COPD & MDI Assessment order using Per Protocol order mode.     Electronically signed by Willem López RCP on 12/16/2021 at 5:03 PM

## 2021-12-16 NOTE — PROGRESS NOTES
Pt alert and oriented x4, awake in bed, tele monitor on, vitals and assessment completed, medications given, bed alarm on and call light within reach, told pt to call for any needs. Conchis Torres RN

## 2021-12-17 ENCOUNTER — TELEPHONE (OUTPATIENT)
Dept: WOUND CARE | Age: 71
End: 2021-12-17

## 2021-12-17 ENCOUNTER — CARE COORDINATION (OUTPATIENT)
Dept: CASE MANAGEMENT | Age: 71
End: 2021-12-17

## 2021-12-17 NOTE — CARE COORDINATION
Brandi 45 Transitions Initial Follow Up Call    Call within 2 business days of discharge: Yes    Patient: Tammie Braga Patient : 1950   MRN: [id number]  Reason for Admission: Covid  Discharge Date: 21 RARS: Readmission Risk Score: 18.4 ( )      Last Discharge [facility]       Complaint Diagnosis Description Type Department Provider    21 Shortness of Breath Acute respiratory failure with hypoxia ([address] . .. ED to Hosp-Admission (Discharged) (ADMITTED) PENNY Mckinnon MD; Lacy July. .. Spoke with: [address]: [facility]    Non-face-to-face services provided:  Obtained and reviewed discharge summary and/or continuity of care documents  Communication with home health agencies or other community services the patient is currently using-[facility]   Transitions of Care Initial Call    Was this an external facility discharge? No Discharge Facility:     Challenges to be reviewed by the provider   Additional needs identified to be addressed with provider: No  home health care-[facility]             Method of communication with provider : phone      Advance Care Planning:   Does patient have an Advance Directive: Reviewed and current  Was this a readmission? No  Patient stated reason for admission: Covid  Patients top risk factors for readmission: ineffective coping    Care Transition Nurse (CTN) contacted the patient by telephone to perform post hospital discharge assessment. Verified name and  with patient as identifiers. Provided introduction to self, and explanation of the CTN role. CTN reviewed discharge instructions, medical action plan and red flags with patient who verbalized understanding. Patient given an opportunity to ask questions and does not have any further questions or concerns at this time. Were discharge instructions available to patient? Yes.  Reviewed appropriate site of care based on symptoms and resources available to patient including: PCP, Specialist, Urgent care clinics, Home health, When to call 911 and [address]. The patient agrees to contact the PCP office for questions related to their healthcare. Medication reconciliation was performed with patient, who verbalizes understanding of administration of home medications. Advised obtaining a 90-day supply of all daily and as-needed medications. Covid Risk Education     Educated patient about risk for severe COVID-19 due to risk factors according to CDC guidelines. LPN CC reviewed discharge instructions, medical action plan and red flag symptoms with the patient who verbalized understanding. Discussed COVID vaccination status: Yes. Education provided on COVID-19 vaccination as appropriate. Discussed exposure protocols and quarantine with CDC Guidelines. Patient was given an opportunity to verbalize any questions and concerns and agrees to contact LPN CC or health care provider for questions related to their healthcare. Reviewed and educated patient on any new and changed medications related to discharge diagnosis. Was patient discharged with a pulse oximeter? Yes Discussed and confirmed pulse oximeter discharge instructions and when to notify provider or seek emergency care. LPN CC provided contact information. Plan for follow-up call in 5-7 days based on severity of symptoms and risk factors. Plan for next call: symptom management-SOB  follow up appointment-Scheduled? This [facility] spoke with pt and pt stated that she was doing well. Patient denied any worsening symptoms. Denied fever, chills, N/V and any difficulty breathing at this time. Denied difficulty with urination, BMs or appetite. Denied chest pain and palpitations. Pt does still have SOB and is on 2.5L of O2 continuously. Pt reports her current O2 sat to be 97%. Full med rec was not performed as pt's [place] nurse was there and pt needed to end the call to engage with her.  Pt did state that she received her Decadron and she is taking that. Pt is staying well hydrated with Gatorade and water and is eating well. Pt encouraged to make healthy choices and pt agreed. [facility] AT [facility] JONES with Delvin Rosales [facility] Nurse was present as well during call). Pt will schedule f/u with non [facility] PCP. Advised pt to immediately report any worsening symptoms to the PCP. Patient verbalized understanding and agreed. Gael Cueto LPN, [facility]  PH: [phone]            Care Transitions 24 Hour Call    Schedule Follow Up Appointment with PCP: Declined  Do you have any ongoing symptoms?: Yes  Patient-reported symptoms: Shortness of Breath  Interventions for patient-reported symptoms: Other  Do you have a copy of your discharge instructions?: Yes  Do you have all of your prescriptions and are they filled?: Yes  Have you been contacted by a [facility] Pharmacist?: No  Have you scheduled your follow up appointment?: No  Were you discharged with any Home Care or Post Acute Services: Yes  Post Acute Services: [address] [place] [address] you feel like you have everything you need to keep you well at home?: Yes  Care Transitions Interventions   Home Care Waiver: Completed            Follow Up  No future appointments.     Gael Cueto LPN

## 2021-12-17 NOTE — TELEPHONE ENCOUNTER
John Sensing from [facility] called and stated that the patient is out of the hospital.  She is calling stating that the patient has a stage 2 on her left upper thigh measuring 1 x 0.7 x 0.1. She states that she placed a mepilex border on it . She also stated that the patient has a callus area on her right inner heel. Instructed to place lotion on callus area. Pt will be out of Covid quarantine on 12/27/2021. Dr Bedolla Saliva aware. Wants to continue mepilex border to upper thigh and place lotion the callus area on foot, John Sensing verbalized an understanding. Pt to follow up in the [facility] after out of quarantine.

## 2021-12-20 ENCOUNTER — TELEPHONE (OUTPATIENT)
Dept: PULMONOLOGY | Age: 71
End: 2021-12-20

## 2021-12-20 ENCOUNTER — APPOINTMENT (OUTPATIENT)
Dept: GENERAL RADIOLOGY | Age: 71
DRG: 177 | End: 2021-12-20
Payer: MEDICARE

## 2021-12-20 ENCOUNTER — HOSPITAL ENCOUNTER (INPATIENT)
Age: 71
LOS: 8 days | Discharge: HOME | DRG: 177 | End: 2021-12-28
Attending: STUDENT IN AN ORGANIZED HEALTH CARE EDUCATION/TRAINING PROGRAM | Admitting: INTERNAL MEDICINE
Payer: MEDICARE

## 2021-12-20 DIAGNOSIS — E87.6 HYPOKALEMIA: ICD-10-CM

## 2021-12-20 DIAGNOSIS — J96.01 ACUTE RESPIRATORY FAILURE WITH HYPOXIA (HCC): Primary | ICD-10-CM

## 2021-12-20 DIAGNOSIS — J18.9 PNEUMONIA DUE TO INFECTIOUS ORGANISM, UNSPECIFIED LATERALITY, UNSPECIFIED PART OF LUNG: ICD-10-CM

## 2021-12-20 DIAGNOSIS — U07.1 COVID: ICD-10-CM

## 2021-12-20 DIAGNOSIS — J44.9 CHRONIC OBSTRUCTIVE PULMONARY DISEASE, UNSPECIFIED COPD TYPE (HCC): ICD-10-CM

## 2021-12-20 PROBLEM — J96.21 ACUTE ON CHRONIC RESPIRATORY FAILURE WITH HYPOXIA (HCC): Status: ACTIVE | Noted: 2021-12-20

## 2021-12-20 LAB
A/G RATIO: 0.6 (ref 1.1–2.2)
ALBUMIN SERPL-MCNC: 3 G/DL (ref 3.4–5)
ALP BLD-CCNC: 115 U/L (ref 40–129)
ALT SERPL-CCNC: 10 U/L (ref 10–40)
ANION GAP SERPL CALCULATED.3IONS-SCNC: 15 MMOL/L (ref 3–16)
APTT: 22.9 SEC (ref 26.2–38.6)
AST SERPL-CCNC: 9 U/L (ref 15–37)
BASE EXCESS VENOUS: 6.2 MMOL/L (ref -3–3)
BASOPHILS ABSOLUTE: 0 K/UL (ref 0–0.2)
BASOPHILS RELATIVE PERCENT: 0.3 %
BILIRUB SERPL-MCNC: 0.8 MG/DL (ref 0–1)
BUN BLDV-MCNC: 24 MG/DL (ref 7–20)
C-REACTIVE PROTEIN: 135.6 MG/L (ref 0–5.1)
CALCIUM SERPL-MCNC: 9.5 MG/DL (ref 8.3–10.6)
CARBOXYHEMOGLOBIN: 3.8 % (ref 0–1.5)
CHLORIDE BLD-SCNC: 86 MMOL/L (ref 99–110)
CO2: 32 MMOL/L (ref 21–32)
CREAT SERPL-MCNC: 1 MG/DL (ref 0.6–1.2)
EKG ATRIAL RATE: 79 BPM
EKG DIAGNOSIS: NORMAL
EKG P AXIS: 76 DEGREES
EKG P-R INTERVAL: 150 MS
EKG Q-T INTERVAL: 392 MS
EKG QRS DURATION: 122 MS
EKG QTC CALCULATION (BAZETT): 449 MS
EKG R AXIS: 51 DEGREES
EKG T AXIS: 76 DEGREES
EKG VENTRICULAR RATE: 79 BPM
EOSINOPHILS ABSOLUTE: 0 K/UL (ref 0–0.6)
EOSINOPHILS RELATIVE PERCENT: 0.3 %
GFR AFRICAN AMERICAN: >60
GFR NON-AFRICAN AMERICAN: 55
GLUCOSE BLD-MCNC: 320 MG/DL (ref 70–99)
GLUCOSE BLD-MCNC: 402 MG/DL
GLUCOSE BLD-MCNC: 402 MG/DL (ref 70–99)
GLUCOSE BLD-MCNC: 473 MG/DL (ref 70–99)
HCO3 VENOUS: 32.5 MMOL/L (ref 23–29)
HCT VFR BLD CALC: 35.9 % (ref 36–48)
HEMOGLOBIN: 11.8 G/DL (ref 12–16)
INR BLD: 0.93 (ref 0.88–1.12)
LACTIC ACID, SEPSIS: 4.2 MMOL/L (ref 0.4–1.9)
LACTIC ACID: 1.8 MMOL/L (ref 0.4–2)
LYMPHOCYTES ABSOLUTE: 0.5 K/UL (ref 1–5.1)
LYMPHOCYTES RELATIVE PERCENT: 8 %
MAGNESIUM: 1.7 MG/DL (ref 1.8–2.4)
MCH RBC QN AUTO: 30.6 PG (ref 26–34)
MCHC RBC AUTO-ENTMCNC: 32.8 G/DL (ref 31–36)
MCV RBC AUTO: 93.5 FL (ref 80–100)
METHEMOGLOBIN VENOUS: 0.3 %
MONOCYTES ABSOLUTE: 0.3 K/UL (ref 0–1.3)
MONOCYTES RELATIVE PERCENT: 5.8 %
NEUTROPHILS ABSOLUTE: 4.9 K/UL (ref 1.7–7.7)
NEUTROPHILS RELATIVE PERCENT: 85.6 %
O2 CONTENT, VEN: 6 VOL %
O2 SAT, VEN: 38 %
O2 THERAPY: ABNORMAL
PCO2, VEN: 54.3 MMHG (ref 40–50)
PDW BLD-RTO: 14.4 % (ref 12.4–15.4)
PERFORMED ON: ABNORMAL
PERFORMED ON: ABNORMAL
PH VENOUS: 7.39 (ref 7.35–7.45)
PLATELET # BLD: 236 K/UL (ref 135–450)
PMV BLD AUTO: 8.3 FL (ref 5–10.5)
PO2, VEN: 22.5 MMHG (ref 25–40)
POTASSIUM REFLEX MAGNESIUM: 3 MMOL/L (ref 3.5–5.1)
PRO-BNP: 3249 PG/ML (ref 0–124)
PROCALCITONIN: 1.33 NG/ML (ref 0–0.15)
PROTHROMBIN TIME: 10.6 SEC (ref 9.9–12.7)
RBC # BLD: 3.84 M/UL (ref 4–5.2)
SODIUM BLD-SCNC: 133 MMOL/L (ref 136–145)
TCO2 CALC VENOUS: 34 MMOL/L
TOTAL PROTEIN: 8.3 G/DL (ref 6.4–8.2)
TROPONIN: 0.02 NG/ML
WBC # BLD: 5.8 K/UL (ref 4–11)

## 2021-12-20 PROCEDURE — 80053 COMPREHEN METABOLIC PANEL: CPT

## 2021-12-20 PROCEDURE — 85730 THROMBOPLASTIN TIME PARTIAL: CPT

## 2021-12-20 PROCEDURE — 96374 THER/PROPH/DIAG INJ IV PUSH: CPT

## 2021-12-20 PROCEDURE — 6370000000 HC RX 637 (ALT 250 FOR IP): Performed by: STUDENT IN AN ORGANIZED HEALTH CARE EDUCATION/TRAINING PROGRAM

## 2021-12-20 PROCEDURE — 2580000003 HC RX 258: Performed by: STUDENT IN AN ORGANIZED HEALTH CARE EDUCATION/TRAINING PROGRAM

## 2021-12-20 PROCEDURE — 99284 EMERGENCY DEPT VISIT MOD MDM: CPT

## 2021-12-20 PROCEDURE — 6360000002 HC RX W HCPCS: Performed by: NURSE PRACTITIONER

## 2021-12-20 PROCEDURE — 6370000000 HC RX 637 (ALT 250 FOR IP): Performed by: NURSE PRACTITIONER

## 2021-12-20 PROCEDURE — 6360000002 HC RX W HCPCS: Performed by: STUDENT IN AN ORGANIZED HEALTH CARE EDUCATION/TRAINING PROGRAM

## 2021-12-20 PROCEDURE — 86140 C-REACTIVE PROTEIN: CPT

## 2021-12-20 PROCEDURE — 84484 ASSAY OF TROPONIN QUANT: CPT

## 2021-12-20 PROCEDURE — 2060000000 HC ICU INTERMEDIATE R&B

## 2021-12-20 PROCEDURE — 85610 PROTHROMBIN TIME: CPT

## 2021-12-20 PROCEDURE — 93010 ELECTROCARDIOGRAM REPORT: CPT | Performed by: INTERNAL MEDICINE

## 2021-12-20 PROCEDURE — 83735 ASSAY OF MAGNESIUM: CPT

## 2021-12-20 PROCEDURE — 83880 ASSAY OF NATRIURETIC PEPTIDE: CPT

## 2021-12-20 PROCEDURE — 93005 ELECTROCARDIOGRAM TRACING: CPT | Performed by: STUDENT IN AN ORGANIZED HEALTH CARE EDUCATION/TRAINING PROGRAM

## 2021-12-20 PROCEDURE — 99223 1ST HOSP IP/OBS HIGH 75: CPT | Performed by: INTERNAL MEDICINE

## 2021-12-20 PROCEDURE — 83036 HEMOGLOBIN GLYCOSYLATED A1C: CPT

## 2021-12-20 PROCEDURE — 2580000003 HC RX 258: Performed by: NURSE PRACTITIONER

## 2021-12-20 PROCEDURE — 82803 BLOOD GASES ANY COMBINATION: CPT

## 2021-12-20 PROCEDURE — 83605 ASSAY OF LACTIC ACID: CPT

## 2021-12-20 PROCEDURE — 84145 PROCALCITONIN (PCT): CPT

## 2021-12-20 PROCEDURE — 87040 BLOOD CULTURE FOR BACTERIA: CPT

## 2021-12-20 PROCEDURE — 85025 COMPLETE CBC W/AUTO DIFF WBC: CPT

## 2021-12-20 PROCEDURE — 71045 X-RAY EXAM CHEST 1 VIEW: CPT

## 2021-12-20 RX ORDER — FLUTICASONE PROPIONATE 50 MCG
1 SPRAY, SUSPENSION (ML) NASAL DAILY
Status: DISCONTINUED | OUTPATIENT
Start: 2021-12-20 | End: 2021-12-28 | Stop reason: HOSPADM

## 2021-12-20 RX ORDER — IPRATROPIUM BROMIDE 42 UG/1
1 SPRAY, METERED NASAL 2 TIMES DAILY
Status: DISCONTINUED | OUTPATIENT
Start: 2021-12-20 | End: 2021-12-28 | Stop reason: HOSPADM

## 2021-12-20 RX ORDER — INSULIN GLARGINE 100 [IU]/ML
15 INJECTION, SOLUTION SUBCUTANEOUS NIGHTLY
Status: DISCONTINUED | OUTPATIENT
Start: 2021-12-20 | End: 2021-12-25

## 2021-12-20 RX ORDER — DEXAMETHASONE SODIUM PHOSPHATE 10 MG/ML
6 INJECTION, SOLUTION INTRAMUSCULAR; INTRAVENOUS ONCE
Status: COMPLETED | OUTPATIENT
Start: 2021-12-20 | End: 2021-12-20

## 2021-12-20 RX ORDER — SODIUM CHLORIDE 9 MG/ML
25 INJECTION, SOLUTION INTRAVENOUS PRN
Status: DISCONTINUED | OUTPATIENT
Start: 2021-12-20 | End: 2021-12-28 | Stop reason: HOSPADM

## 2021-12-20 RX ORDER — DEXTROSE MONOHYDRATE 25 G/50ML
12.5 INJECTION, SOLUTION INTRAVENOUS PRN
Status: DISCONTINUED | OUTPATIENT
Start: 2021-12-20 | End: 2021-12-28 | Stop reason: HOSPADM

## 2021-12-20 RX ORDER — ALBUTEROL SULFATE 90 UG/1
2 AEROSOL, METERED RESPIRATORY (INHALATION) EVERY 4 HOURS PRN
Status: DISCONTINUED | OUTPATIENT
Start: 2021-12-20 | End: 2021-12-28 | Stop reason: HOSPADM

## 2021-12-20 RX ORDER — 0.9 % SODIUM CHLORIDE 0.9 %
30 INTRAVENOUS SOLUTION INTRAVENOUS ONCE
Status: COMPLETED | OUTPATIENT
Start: 2021-12-20 | End: 2021-12-20

## 2021-12-20 RX ORDER — SODIUM CHLORIDE 0.9 % (FLUSH) 0.9 %
5-40 SYRINGE (ML) INJECTION EVERY 12 HOURS SCHEDULED
Status: DISCONTINUED | OUTPATIENT
Start: 2021-12-20 | End: 2021-12-28 | Stop reason: HOSPADM

## 2021-12-20 RX ORDER — MIDODRINE HYDROCHLORIDE 5 MG/1
2.5 TABLET ORAL 3 TIMES DAILY
Status: DISCONTINUED | OUTPATIENT
Start: 2021-12-20 | End: 2021-12-25

## 2021-12-20 RX ORDER — DULOXETIN HYDROCHLORIDE 60 MG/1
60 CAPSULE, DELAYED RELEASE ORAL DAILY
Status: DISCONTINUED | OUTPATIENT
Start: 2021-12-20 | End: 2021-12-28 | Stop reason: HOSPADM

## 2021-12-20 RX ORDER — ASPIRIN 81 MG/1
81 TABLET ORAL DAILY
Status: DISCONTINUED | OUTPATIENT
Start: 2021-12-20 | End: 2021-12-28 | Stop reason: HOSPADM

## 2021-12-20 RX ORDER — POTASSIUM CHLORIDE 20 MEQ/1
40 TABLET, EXTENDED RELEASE ORAL ONCE
Status: COMPLETED | OUTPATIENT
Start: 2021-12-20 | End: 2021-12-20

## 2021-12-20 RX ORDER — GABAPENTIN 300 MG/1
300 CAPSULE ORAL 2 TIMES DAILY
Status: DISCONTINUED | OUTPATIENT
Start: 2021-12-20 | End: 2021-12-28 | Stop reason: HOSPADM

## 2021-12-20 RX ORDER — ACETAMINOPHEN 325 MG/1
650 TABLET ORAL EVERY 6 HOURS PRN
Status: DISCONTINUED | OUTPATIENT
Start: 2021-12-20 | End: 2021-12-28 | Stop reason: HOSPADM

## 2021-12-20 RX ORDER — MAGNESIUM SULFATE 1 G/100ML
1000 INJECTION INTRAVENOUS ONCE
Status: COMPLETED | OUTPATIENT
Start: 2021-12-20 | End: 2021-12-20

## 2021-12-20 RX ORDER — ONDANSETRON 4 MG/1
4 TABLET, ORALLY DISINTEGRATING ORAL EVERY 8 HOURS PRN
Status: DISCONTINUED | OUTPATIENT
Start: 2021-12-20 | End: 2021-12-28 | Stop reason: HOSPADM

## 2021-12-20 RX ORDER — SPIRONOLACTONE 25 MG/1
25 TABLET ORAL DAILY
Status: DISCONTINUED | OUTPATIENT
Start: 2021-12-20 | End: 2021-12-28 | Stop reason: HOSPADM

## 2021-12-20 RX ORDER — ATORVASTATIN CALCIUM 40 MG/1
40 TABLET, FILM COATED ORAL NIGHTLY
Status: DISCONTINUED | OUTPATIENT
Start: 2021-12-20 | End: 2021-12-28 | Stop reason: HOSPADM

## 2021-12-20 RX ORDER — ALBUTEROL SULFATE 90 UG/1
2 AEROSOL, METERED RESPIRATORY (INHALATION) ONCE
Status: COMPLETED | OUTPATIENT
Start: 2021-12-20 | End: 2021-12-20

## 2021-12-20 RX ORDER — CALCIUM CARBONATE 200(500)MG
500 TABLET,CHEWABLE ORAL DAILY
Status: DISCONTINUED | OUTPATIENT
Start: 2021-12-20 | End: 2021-12-28 | Stop reason: HOSPADM

## 2021-12-20 RX ORDER — SODIUM CHLORIDE 0.9 % (FLUSH) 0.9 %
5-40 SYRINGE (ML) INJECTION PRN
Status: DISCONTINUED | OUTPATIENT
Start: 2021-12-20 | End: 2021-12-28 | Stop reason: HOSPADM

## 2021-12-20 RX ORDER — CYCLOBENZAPRINE HCL 10 MG
10 TABLET ORAL 2 TIMES DAILY PRN
Status: DISCONTINUED | OUTPATIENT
Start: 2021-12-20 | End: 2021-12-28 | Stop reason: HOSPADM

## 2021-12-20 RX ORDER — CETIRIZINE HYDROCHLORIDE 10 MG/1
10 TABLET ORAL DAILY
Status: DISCONTINUED | OUTPATIENT
Start: 2021-12-20 | End: 2021-12-28 | Stop reason: HOSPADM

## 2021-12-20 RX ORDER — ONDANSETRON 2 MG/ML
4 INJECTION INTRAMUSCULAR; INTRAVENOUS EVERY 6 HOURS PRN
Status: DISCONTINUED | OUTPATIENT
Start: 2021-12-20 | End: 2021-12-28 | Stop reason: HOSPADM

## 2021-12-20 RX ORDER — PANTOPRAZOLE SODIUM 40 MG/1
40 TABLET, DELAYED RELEASE ORAL
Status: DISCONTINUED | OUTPATIENT
Start: 2021-12-21 | End: 2021-12-28 | Stop reason: HOSPADM

## 2021-12-20 RX ORDER — TORSEMIDE 20 MG/1
20 TABLET ORAL DAILY
Status: DISCONTINUED | OUTPATIENT
Start: 2021-12-20 | End: 2021-12-25

## 2021-12-20 RX ORDER — SODIUM CHLORIDE 9 MG/ML
INJECTION, SOLUTION INTRAVENOUS CONTINUOUS
Status: DISCONTINUED | OUTPATIENT
Start: 2021-12-20 | End: 2021-12-21

## 2021-12-20 RX ORDER — NICOTINE POLACRILEX 4 MG
15 LOZENGE BUCCAL PRN
Status: DISCONTINUED | OUTPATIENT
Start: 2021-12-20 | End: 2021-12-28 | Stop reason: HOSPADM

## 2021-12-20 RX ORDER — CLOPIDOGREL BISULFATE 75 MG/1
75 TABLET ORAL DAILY
Status: DISCONTINUED | OUTPATIENT
Start: 2021-12-20 | End: 2021-12-28 | Stop reason: HOSPADM

## 2021-12-20 RX ORDER — MAGNESIUM SULFATE HEPTAHYDRATE 500 MG/ML
1000 INJECTION, SOLUTION INTRAMUSCULAR; INTRAVENOUS ONCE
Status: DISCONTINUED | OUTPATIENT
Start: 2021-12-20 | End: 2021-12-20

## 2021-12-20 RX ORDER — POLYETHYLENE GLYCOL 3350 17 G/17G
17 POWDER, FOR SOLUTION ORAL DAILY PRN
Status: DISCONTINUED | OUTPATIENT
Start: 2021-12-20 | End: 2021-12-28 | Stop reason: HOSPADM

## 2021-12-20 RX ORDER — MORPHINE SULFATE 15 MG/1
15 TABLET, FILM COATED, EXTENDED RELEASE ORAL 2 TIMES DAILY
Status: DISCONTINUED | OUTPATIENT
Start: 2021-12-20 | End: 2021-12-28 | Stop reason: HOSPADM

## 2021-12-20 RX ORDER — DEXTROSE MONOHYDRATE 50 MG/ML
100 INJECTION, SOLUTION INTRAVENOUS PRN
Status: DISCONTINUED | OUTPATIENT
Start: 2021-12-20 | End: 2021-12-28 | Stop reason: HOSPADM

## 2021-12-20 RX ORDER — METOPROLOL SUCCINATE 25 MG/1
12.5 TABLET, EXTENDED RELEASE ORAL DAILY
Status: DISCONTINUED | OUTPATIENT
Start: 2021-12-20 | End: 2021-12-28 | Stop reason: HOSPADM

## 2021-12-20 RX ORDER — ACETAMINOPHEN 650 MG/1
650 SUPPOSITORY RECTAL EVERY 6 HOURS PRN
Status: DISCONTINUED | OUTPATIENT
Start: 2021-12-20 | End: 2021-12-28 | Stop reason: HOSPADM

## 2021-12-20 RX ORDER — DEXAMETHASONE SODIUM PHOSPHATE 10 MG/ML
6 INJECTION, SOLUTION INTRAMUSCULAR; INTRAVENOUS DAILY
Status: DISCONTINUED | OUTPATIENT
Start: 2021-12-21 | End: 2021-12-22

## 2021-12-20 RX ORDER — LATANOPROST 50 UG/ML
1 SOLUTION/ DROPS OPHTHALMIC NIGHTLY
Status: DISCONTINUED | OUTPATIENT
Start: 2021-12-20 | End: 2021-12-28 | Stop reason: HOSPADM

## 2021-12-20 RX ADMIN — VANCOMYCIN HYDROCHLORIDE 1000 MG: 1 INJECTION, POWDER, LYOPHILIZED, FOR SOLUTION INTRAVENOUS at 18:28

## 2021-12-20 RX ADMIN — ATORVASTATIN CALCIUM 40 MG: 40 TABLET, FILM COATED ORAL at 21:39

## 2021-12-20 RX ADMIN — SODIUM CHLORIDE: 9 INJECTION, SOLUTION INTRAVENOUS at 18:31

## 2021-12-20 RX ADMIN — TORSEMIDE 20 MG: 20 TABLET ORAL at 18:26

## 2021-12-20 RX ADMIN — MORPHINE SULFATE 15 MG: 15 TABLET, FILM COATED, EXTENDED RELEASE ORAL at 21:38

## 2021-12-20 RX ADMIN — INSULIN LISPRO 12 UNITS: 100 INJECTION, SOLUTION INTRAVENOUS; SUBCUTANEOUS at 18:38

## 2021-12-20 RX ADMIN — GABAPENTIN 300 MG: 300 CAPSULE ORAL at 21:39

## 2021-12-20 RX ADMIN — POTASSIUM CHLORIDE 40 MEQ: 1500 TABLET, EXTENDED RELEASE ORAL at 15:30

## 2021-12-20 RX ADMIN — ALBUTEROL SULFATE 2 PUFF: 90 AEROSOL, METERED RESPIRATORY (INHALATION) at 13:17

## 2021-12-20 RX ADMIN — CEFEPIME HYDROCHLORIDE 1000 MG: 1 INJECTION, POWDER, FOR SOLUTION INTRAMUSCULAR; INTRAVENOUS at 15:30

## 2021-12-20 RX ADMIN — CALCIUM CARBONATE (ANTACID) CHEW TAB 500 MG 500 MG: 500 CHEW TAB at 18:26

## 2021-12-20 RX ADMIN — INSULIN LISPRO 6 UNITS: 100 INJECTION, SOLUTION INTRAVENOUS; SUBCUTANEOUS at 21:44

## 2021-12-20 RX ADMIN — MAGNESIUM SULFATE HEPTAHYDRATE 1000 MG: 1 INJECTION, SOLUTION INTRAVENOUS at 15:24

## 2021-12-20 RX ADMIN — SODIUM CHLORIDE 1716 ML: 9 INJECTION, SOLUTION INTRAVENOUS at 15:21

## 2021-12-20 RX ADMIN — ENOXAPARIN SODIUM 30 MG: 100 INJECTION SUBCUTANEOUS at 21:39

## 2021-12-20 RX ADMIN — IPRATROPIUM BROMIDE 1 SPRAY: 42 SPRAY NASAL at 21:38

## 2021-12-20 RX ADMIN — LATANOPROST 1 DROP: 50 SOLUTION OPHTHALMIC at 21:39

## 2021-12-20 RX ADMIN — INSULIN GLARGINE 15 UNITS: 100 INJECTION, SOLUTION SUBCUTANEOUS at 21:44

## 2021-12-20 RX ADMIN — DEXAMETHASONE SODIUM PHOSPHATE 6 MG: 10 INJECTION INTRAMUSCULAR; INTRAVENOUS at 13:17

## 2021-12-20 ASSESSMENT — PAIN SCALES - GENERAL: PAINLEVEL_OUTOF10: 8

## 2021-12-20 ASSESSMENT — ENCOUNTER SYMPTOMS
VOMITING: 0
SHORTNESS OF BREATH: 1
ABDOMINAL PAIN: 0
SORE THROAT: 0
STRIDOR: 0
RHINORRHEA: 0
PHOTOPHOBIA: 0
COUGH: 1
NAUSEA: 0
BACK PAIN: 0

## 2021-12-20 NOTE — PROGRESS NOTES
Pharmacy to dose Vanc per Dr Juan Luis Lopez  Dx: HAP wt 57kg  Vanc 1250mg times one in ED  Vipul PERRY 12/20/20211:59 PM  .

## 2021-12-20 NOTE — PLAN OF CARE
Admit to PCU with continuous pulse ox    Pt COVID+ discharged recently on 2 liters oxygen  Now back with hypoxia- up to 5-6 liters   Lactic acidosis--trend IVF, monitor for fluid overload  Elevated procal  cannot rule out superimposed bacterial HCAP, started on vanc/cefepime  Pulmonary consult      Jayro Liu, LORENZO - CNP

## 2021-12-20 NOTE — CONSULTS
Pharmacy Note  Vancomycin Consult    Marion Lamas is a 80 y.o. female started on Vancomycin for pneumonia; consult received from Don Reyes CNP, to manage therapy. Also receiving the following antibiotics: Cefepime.     Patient Active Problem List   Diagnosis    Rheumatoid arthritis ([address]    Psoriasis    GERD (gastroesophageal reflux disease)    Anemia    Cylindrical bronchiectasis ([address]    Tracheobronchomalacia    Immunocompromised state ([address]    Coronary artery disease    Bilateral lower extremity edema    Essential hypertension    Lumbar spondylosis    Mitral valve insufficiency and aortic valve insufficiency    Mixed hyperlipidemia    Myopia of both eyes    Osteoporosis    Other chronic sinusitis    Primary open angle glaucoma (POAG) of both eyes, mild stage    Primary osteoarthritis of right hip    Type 2 diabetes mellitus with unspecified diabetic retinopathy without macular edema (HCC)    Type 2 diabetes mellitus with diabetic peripheral angiopathy without gangrene, with long-term current use of insulin (HCC)    Pressure ulcer of coccygeal region, stage 4 (HCC)    Hx of CABG    Mild malnutrition (HCC)    Acute on chronic diastolic CHF (congestive heart failure) (HCC)    Chronic obstructive pulmonary disease (HCC)    Nonrheumatic mitral (valve) insufficiency    Acute respiratory failure with hypoxia (HCC)    COVID-19    Acute on chronic respiratory failure with hypoxia (HCC)     Allergies:  Atenolol     Temp max: 98.3 degrees F    Recent Labs     12/20/21  1255 12/20/21  1304   BUN 24*  --    CREATININE 1.0  --    WBC  --  5.8       Intake/Output Summary (Last 24 hours) at 12/20/2021 1711  Last data filed at 12/20/2021 1635  Gross per 24 hour   Intake 1912 ml   Output --   Net 1912 ml     Culture Date      Source                       Results      Ht Readings from Last 1 Encounters:   12/20/21 5' 8\" (1.727 m)        Wt Readings from Last 1 Encounters:   12/20/21 126 lb (57.2 kg)       Body mass index is 19.16 kg/m². Estimated Creatinine Clearance: 47 mL/min (based on SCr of 1 mg/dL). Goal Trough Level: 12.9 mcg/mL    Assessment/Plan:  Will initiate Vancomycin with a one time loading dose of 1000 mg x1, followed by 1000 mg IV every 24 hours. Timing of trough level will be determined based on culture results, renal function, and clinical response. Thank you for the consult. Will continue to follow.   JOSE Carr.Ph.12/20/20215:12 PM

## 2021-12-20 NOTE — ED PROVIDER NOTES
[address] ED  EMERGENCY DEPARTMENT ENCOUNTER      Pt Name: Blayne Dominique  MRN: [id number]  [place] 1950  Date of evaluation: 12/20/2021  Provider: Susy Bal, [facility]       Chief Complaint   Patient presents with    Positive For Covid-19     pt states COVID positive 1 week ago, was sent home on oxygen. pt states home health nurse wanted her seen again due to oxygen being upper 80s-90 today         HISTORY OF PRESENT ILLNESS   (Location/Symptom, Timing/Onset, Context/Setting, Quality, Duration, Modifying Factors, Severity)  Note limiting factors. Blayne Dominique is a 80 y.o. female who presents to the emergency department complaining of worsening shortness of breath, fatigue. Patient was admitted to the hospital this past Monday after she began to have respiratory symptoms, was diagnosed with Covid, she was admitted and discharged on Thursday on 2 L oxygen which is a new oxygen requirement for her. History of COPD, CHF. Home health was at the home earlier today and she was found to be hypoxic into the 80s on the 2 L this was increased to 4 L however due to symptoms she was sent for evaluation here on 4 L oxygen patient was satting in the 70s during triage. She was increased to 6 L nasal cannula with improvement greater than 90%. Complains of worsening shortness of breath, productive cough with yellow-green sputum, and overall fatigue. No chest pain no abdominal pain no nausea no vomiting or diarrhea. Nursing Notes were reviewed.     PAST MEDICAL HISTORY     Past Medical History:   Diagnosis Date    Acute on chronic diastolic heart failure due to coronary artery disease (HCC)     Acute respiratory failure with hypoxia (HCC)     Atherosclerosis of native artery of right lower extremity with rest pain ([address] 07/25/2017    Back pain     Branch retinal vein occlusion 07/20/2012    Bronchiectasis with acute exacerbation (HCC)     Cellulitis and abscess of left Date    ARTERY BIOPSY Right 03/01/2021    at [address]  05/30/2013    left temporal artery biopsy    BACK SURGERY  08/2020    BRONCHOSCOPY      BRONCHOSCOPY N/A 06/12/2019    BRONCHOSCOPY ALVEOLAR LAVAGE performed by Beka Alexandre MD at [address]  05/03/2021    CATARACT REMOVAL      COLONOSCOPY      CORONARY ARTERY BYPASS GRAFT N/A 05/03/2021    CORONARY ARTERY BYPASS X3 WITH LEFT ATRIAL APPENDAGE CLIP, 5 LEVEL BILATERAL INTERCOSTAL NERVE BLOCK, STERNAL PLATING performed by Ricky Jackson MD at [facility]      IR [address] CATH  7/14/2021    IR MIDLINE CATH 7/14/2021 [facility] SPECIAL PROCEDURES    KNEE ARTHROSCOPY      left    KYPHOSIS SURGERY      LAMINECTOMY      LEG SURGERY Left 7/13/2021    INCISION AND DEBRIDEMENT LEFT LOWER LEG WOUND WITH POSSIBLE WOUND VAC PLACEMENT performed by Mitchell Interiano MD at [address] [place] [address]  02/2020    MEDIASTINOSCOPY N/A 05/05/2021    MEDIASTINAL EXPLORATION AND EVACUATION OF HEMATOMA performed by Ricky Jackson MD at [facility]  01/08/2021    sacral wound debridement    PRESSURE ULCER DEBRIDEMENT N/A 01/08/2021    SACRAL WOUND DEBRIDEMENT performed by Chato Dunham MD at [address] SEPTOPLASTY  05/07/2013    FESS with balloon    SPINAL FUSION      TRANSESOPHAGEAL ECHOCARDIOGRAM  11/02/2021    TUBAL LIGATION      UPPER GASTROINTESTINAL ENDOSCOPY  04/08/2014    Dilitation         CURRENT MEDICATIONS       Previous Medications    ACCU-CHEK CEDRICK PLUS STRIP    TEST 4 TIMES DAILY    ALBUTEROL SULFATE  (90 BASE) MCG/ACT INHALER    INHALE 2 PUFFS INTO THE LUNGS EVERY 4 HOURS AS NEEDED FOR WHEEZING    ASPIRIN EC 81 MG EC TABLET    Take 1 tablet by mouth daily    ATORVASTATIN (LIPITOR) 40 MG TABLET    Take 40 mg by mouth    CALCIUM CARBONATE (OSCAL) 500 MG TABS Take 4 mg by mouth daily    SPIRONOLACTONE (ALDACTONE) 25 MG TABLET    Take 1 tablet by mouth daily    TERIPARATIDE, RECOMBINANT, (FORTEO) 600 MCG/2.4ML SOPN INJECTION    Inject 20 mcg into the skin daily    TORSEMIDE (DEMADEX) 20 MG TABLET    Take 2 tablets by mouth daily If weight 131 lbs or less, decrease to 20 mg daily    VITAMIN D (CHOLECALCIFEROL) 1000 UNIT TABS TABLET    Take 5,000 Units by mouth daily        ALLERGIES     Atenolol    FAMILY HISTORY       Family History   Problem Relation Age of Onset    Diabetes Mother     Hypertension Mother     Asthma Other     Heart Disease Brother     Diabetes Brother     Diabetes Sister     Heart Disease Brother     Cancer Neg Hx     Emphysema Neg Hx     Heart Failure Neg Hx           SOCIAL HISTORY       Social History     Socioeconomic History    Marital status:      Spouse name: None    Number of children: None    Years of education: None    Highest education level: None   Occupational History    None   Tobacco Use    Smoking status: Former Smoker     Packs/day: 1.00     Years: 20.00     Pack years: 20.00     Types: Cigarettes     Quit date: 1991     Years since quittin.7    Smokeless tobacco: Never Used   Vaping Use    Vaping Use: Never used   Substance and Sexual Activity    Alcohol use: Not Currently     Alcohol/week: 0.0 standard drinks     Comment: rarely    Drug use: No    Sexual activity: None   Other Topics Concern    None   Social History Narrative    None     Social Determinants of Health     Financial Resource Strain:     Difficulty of Paying Living Expenses: Not on file   Food Insecurity:     Worried About Running Out of Food in the Last Year: Not on file    Daina of Food in the Last Year: Not on file   Transportation Needs:     Lack of Transportation (Medical): Not on file    Lack of Transportation (Non-Medical):  Not on file   Physical Activity:     Days of Exercise per Week: Not on file    Minutes of Exercise per Session: Not on file   Stress:     Feeling of Stress : Not on file   Social Connections:     Frequency of Communication with Friends and Family: Not on file    Frequency of Social Gatherings with Friends and Family: Not on file    Attends [employer] Services: Not on file    Active Member of [address] [place] [address] or Organizations: Not on file    Attends Club or Organization Meetings: Not on file    Marital Status: Not on file   Intimate Partner Violence:     Fear of Current or Ex-Partner: Not on file    Emotionally Abused: Not on file    Physically Abused: Not on file    Sexually Abused: Not on file   Housing Stability:     Unable to Pay for Housing in the Last Year: Not on file    Number of [place] in the Last Year: Not on file    Unstable Housing in the Last Year: Not on file       SCREENINGS                            REVIEW OF SYSTEMS    (2-9 systems for level 4, 10 or more for level 5)   Review of Systems   Constitutional: Positive for fatigue. Negative for chills and fever. HENT: Negative for congestion, rhinorrhea and sore throat. Eyes: Negative for photophobia and visual disturbance. Respiratory: Positive for cough and shortness of breath. Negative for stridor. Cardiovascular: Negative for chest pain and palpitations. Gastrointestinal: Negative for abdominal pain, nausea and vomiting. Genitourinary: Negative for decreased urine volume. Musculoskeletal: Negative for back pain, neck pain and neck stiffness. Skin: Negative for rash. Hematological: Negative for adenopathy. Psychiatric/Behavioral: Negative for confusion. PHYSICAL EXAM    (up to 7 for level 4, 8 or more for level 5)   RECENT VITALS:     Temp: 98.3 °F (36.8 °C),  Pulse: 76, Resp: 15, BP: (!) 111/58, SpO2: 93 %    Physical Exam  Constitutional:       General: She is not in acute distress. Appearance: She is not diaphoretic. HENT:      Head: Normocephalic and atraumatic.       Mouth/Throat:      Mouth: Mucous membranes are dry. Eyes:      Pupils: Pupils are equal, round, and reactive to light. Neck:      Trachea: No tracheal deviation. Cardiovascular:      Rate and Rhythm: Normal rate and regular rhythm. Pulmonary:      Breath sounds: No stridor. Wheezing and rhonchi present. Comments: tachypnea  Abdominal:      General: There is no distension. Palpations: Abdomen is soft. Tenderness: There is no abdominal tenderness. There is no guarding. Musculoskeletal:         General: Normal range of motion. Cervical back: Normal range of motion and neck supple. Skin:     General: Skin is warm. Neurological:      Mental Status: She is oriented to person, place, and time. DIAGNOSTIC RESULTS     EKG: All EKG's are interpreted by the Emergency Department Physician who either signs or Co-signs this chart in the absence of a cardiologist.      The Ekg interpreted by me shows  normal sinus rhythm with a rate of 79  Axis is   Normal  QTc is  normal  Intervals and Durations are unremarkable. ST Segments: nonspecific changes          RADIOLOGY:   Non-plain film images such as CT, Ultrasound and MRI are read by the radiologist. Plain radiographic images are visualized and preliminarily interpreted by the emergency physician. Interpretation per the Radiologist below, if available at the time of this note:    XR CHEST PORTABLE   Final Result   Increased patchy opacities in the right lung which may reflect airspace   disease superimposed on background emphysematous changes.                LABS:  Labs Reviewed   CBC WITH AUTO DIFFERENTIAL - Abnormal; Notable for the following components:       Result Value    RBC 3.84 (*)     Hemoglobin 11.8 (*)     Hematocrit 35.9 (*)     Lymphocytes Absolute 0.5 (*)     All other components within normal limits    Narrative:     Performed at:  [facility],  [place], [facility]   Phone [phone] COMPREHENSIVE METABOLIC PANEL W/ REFLEX TO MG FOR LOW K - Abnormal; Notable for the following components:    Sodium 133 (*)     Potassium reflex Magnesium 3.0 (*)     Chloride 86 (*)     Glucose 320 (*)     BUN 24 (*)     GFR Non- 55 (*)     Total Protein 8.3 (*)     Albumin 3.0 (*)     Albumin/Globulin Ratio 0.6 (*)     AST 9 (*)     All other components within normal limits    Narrative:     Performed at:  [facility],  [place]   Phone [phone]   TROPONIN - Abnormal; Notable for the following components:    Troponin 0.02 (*)     All other components within normal limits    Narrative:     Performed at:  [place] [address],  [place]   Phone [phone]   BLOOD GAS, VENOUS - Abnormal; Notable for the following components:    pCO2, Serafin 54.3 (*)     pO2, Serafin 22.5 (*)     HCO3, Venous 32.5 (*)     Base Excess, Serafin 6.2 (*)     Carboxyhemoglobin 3.8 (*)     All other components within normal limits    Narrative:     Performed at:  [facility],  [place]   Phone [phone]   LACTATE, SEPSIS - Abnormal; Notable for the following components:    Lactic Acid, Sepsis 4.2 (*)     All other components within normal limits    Narrative:     [address] [id number],  Chemistry results called to and read back by Devora Falcon RN, 12/20/2021  13:20, by Natacha Barnard  Performed at:  [facility],  [place], [facility]   Phone [phone]   PROCALCITONIN - Abnormal; Notable for the following components:    Procalcitonin 1.33 (*)     All other components within normal limits    Narrative:     Performed at:  [facility],  [place], [facility]   Phone [phone]   BRAIN NATRIURETIC PEPTIDE - Abnormal; Notable for the following components:    Pro-BNP 3,249 (*)     All other components within normal limits    Narrative:     Performed at:  [facility]) - [facility],  [place], [facility]   Phone [phone]   APTT - Abnormal; Notable for the following components:    aPTT 22.9 (*)     All other components within normal limits    Narrative:     Performed at:  [facility],  [place], [facility]   Phone [phone]   MAGNESIUM - Abnormal; Notable for the following components:    Magnesium 1.70 (*)     All other components within normal limits    Narrative:     Performed at:  [facility]) - [facility],  [place], [facility]   Phone [phone]   CULTURE, BLOOD 1   CULTURE, BLOOD 2   PROTIME-INR    Narrative:     Performed at:  [facility],  [place], [facility]   Phone [phone]   LACTATE, SEPSIS   C-REACTIVE PROTEIN       All other labs were within normal range or not returned as of this dictation. EMERGENCY DEPARTMENT COURSE and DIFFERENTIAL DIAGNOSIS/MDM:   Roger Navas is a 80 y.o. female who presents to the emergency department with the complaint of worsening fatigue, shortness of breath, productive cough, is known Covid positive symptomatic for the last 7 or 8days. Initially was admitted to hospital last week was discharged on 2 L, increasing oxygen requirement, found to be hypoxic on 4 L here into the 70s, with did improve on 6 L. Patient has diminished breath sounds throughout, wheeze rhonchi in the lower lung field. Will check pro-Oziel, white blood cell count, likely coverage with antibiotics due to worsening shortness of breath, and cough following hospitalization, and Covid. Will evaluate chest x-ray prior to initiation of antibiotics.     Airspace opacities, elevated procalcitonin, elevated lactic acid, patient was started on vancomycin, cefepime, given 30 cc/kg fluid bolus. Concern for superimposed bacterial infection on top of known Covid. Mag, potassium replaced. Patient on 5 L nasal cannula sats greater than 90%, given MDI albuterol, Decadron. Plan is for admission due to hypoxic respite failure secondary to Covid, pneumonia. CRITICAL CARE TIME   Total Critical Care time was 32 minutes, excluding separately reportable procedures. There was a high probability of clinically significant/life threatening deterioration in the patient's condition which required my urgent intervention. Clinical concern acute hypoxic respiratory failure, concern for possible sepsis  Intervention history, physical exam, chart review, sepsis labs, medication management, multiple reassessments to assess respiratory status, charting    CONSULTS:  PHARMACY TO DOSE VANCOMYCIN  IP CONSULT TO HOSPITALIST    PROCEDURES:  Unless otherwise noted below, none     Procedures        FINAL IMPRESSION      1. Acute respiratory failure with hypoxia ([address]    2. COVID    3. Hypokalemia    4. Pneumonia due to infectious organism, unspecified laterality, unspecified part of lung          DISPOSITION/PLAN   DISPOSITION Decision To Admit 12/20/2021 01:38:43 PM      PATIENT REFERRED TO:  No follow-up provider specified. DISCHARGE MEDICATIONS:  New Prescriptions    No medications on file     Controlled Substances Monitoring:     No flowsheet data found.     (Please note that portions of this note were completed with a voice recognition program.  Efforts were made to edit the dictations but occasionally words are mis-transcribed.)    Colletta Peacemaker, DO (electronically signed)  Attending Emergency Physician            Colletta Peacemaker, DO  12/20/21 0328

## 2021-12-20 NOTE — TELEPHONE ENCOUNTER
Daughter Venecia Myron called stating that pt was d/c from hospital on 12/16/21 and pt started having a harder time breathing over the weekend. Today when Venecia Myron called to check on pt, pt noted that O2 levels are 82-83% on 3 LPM.  Pt weight has \"remained stable, has even gone down a little\". Pt continues to take Decadron. Venecia Garces asking if pt should turn up her oxygen, given low O2 sats? Hospital consult Dr. Doretha Goodman 12/14/21:        ASSESSMENT:  · COVID-19 in a fully vaccinated individual  · Acute hypoxic respiratory failure -improved  · Leukopenia  · COPD not in exacerbation  · RA on Enbrel  · CAD with h/o CABG 5/3/21  · DANIELLE on CPAP     PLAN:  · Droplet Plus Airborne Precautions  · Ok to not use CPAP tonight   · Supplemental oxygen to keep saturation greater than 92%  · Decadron 6 mg QD, D#1, monitor glucose closely  · Remdesevir D#1  · Lovenox twice daily  · Combivent  · Diuresis per IM  · Consider stopping remdesivir tomorrow if she remains on room air. Consider stopping steroids after 5 days if pt remains on room air. Please call with questions.

## 2021-12-20 NOTE — CONSULTS
Patient is being seen at the request of Chasidy Quintero NP for a consultation for COVID-19 and respiratory failure in a vaccinated pt. Hospital Day: 1     HISTORY OF PRESENT ILLNESS: Miya Galvez is a 80 y.o. female with a PMHx of COPD, CHF, bronchiectasis, & DANIELLE, who returned to the ED on 12/20/2021 after d/c from 3-day admission for CHF exacerbation and COVID19 pneumonia on 12/16/2021. Reports few-day history of progressive moderate shortness of breath, worse with ambulation, and associated with hypoxemia to 82% on 3 L O2, productive cough of green sputum, & fatigue. She was sent home on a new O2 requirement of 2 L; also still on decadron. She says she did well, even coming off oxygen for a period on Saturday, but then had worsening of cough and respiratory symptoms, weakness/fatigue. Upon arrival to the ED, pt's SpO2 was in 70s% on 4 L O2, which recovered with 6 L. Original sx onset was 7 days ago, 12/13/21, when she went to the ED for sob. She is fully vaccinated for COVID19.      PAST MEDICAL HISTORY:  Past Medical History:   Diagnosis Date    Acute on chronic diastolic heart failure due to coronary artery disease (HCC)     Acute respiratory failure with hypoxia (HCC)     Atherosclerosis of native artery of right lower extremity with rest pain ([address] 07/25/2017    Back pain     Branch retinal vein occlusion 07/20/2012    Bronchiectasis with acute exacerbation (HCC)     Cellulitis and abscess of left leg 7/11/2021    Cellulitis of left lower extremity 7/11/2021    S/P vein harvest 05/2021 for CABG    CHF (congestive heart failure) (HCC)     Closed compression fracture of thoracic vertebra ([address] 01/15/2020    Closed fracture of facial bone with routine healing 11/21/2016    Closed jaw fracture ([address] 01/15/2020    Community acquired pneumonia of left lower lobe of lung     Compression fracture of L1 lumbar vertebra ([address] 01/15/2020    COPD (chronic obstructive pulmonary disease) (HCC)     Fracture of tibial plateau, closed, left, initial encounter 12/05/2017    Minimally displaced zone I fracture of sacrum (HCC) 09/02/2020    MRSA (methicillin resistant staph aureus) culture positive 07/2021    MRSA (methicillin resistant Staphylococcus aureus) 07/13/2021    wound    Mucus plugging of bronchi     NSTEMI (non-ST elevated myocardial infarction) ([address] 4/23/2021    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 Bone density test last year showed severe osteoporosis. Recently, two broken vertebrae (L1, L2) due to coughing. Diagnosed with tracheomalacia and stated she must cough very hard to clear phlegm. Was coughing due to upper respiratory infections which have been treated. Hx of laminectomy and recent kyphoplasty.    Refractured her jawbone which was previously repaired wi    Post herpetic neuralgia     Proximal humerus fracture 10/01/2019    Rheumatoid arthritis ([address]     Shingles 05/2020    Sleep apnea     Status post incision and drainage 07/2021    Left Leg    Surgical wound dehiscence, initial encounter (at [facility] harvest site) 7/12/2021    Temporal arteritis ([address] 07/10/2013    Tobacco use 10/19/2017    Tracheomalacia     Vitreous hemorrhage, right eye ([address] 02/21/2020     PAST SURGICAL HISTORY:  Past Surgical History:   Procedure Laterality Date    ARTERY BIOPSY Right 03/01/2021    at [address]  05/30/2013    left temporal artery biopsy    BACK SURGERY  08/2020    BRONCHOSCOPY      BRONCHOSCOPY N/A 06/12/2019    BRONCHOSCOPY ALVEOLAR LAVAGE performed by Sofía Rene MD at [address]  05/03/2021    CATARACT REMOVAL      COLONOSCOPY      CORONARY ARTERY BYPASS GRAFT N/A 05/03/2021    CORONARY ARTERY BYPASS X3 WITH LEFT ATRIAL APPENDAGE CLIP, 5 LEVEL BILATERAL INTERCOSTAL NERVE BLOCK, STERNAL PLATING performed by Mark Miller MD at [address]      IR MIDLINE CATH  7/14/2021    IR MIDLINE CATH 7/14/2021 [facility]ED[facility]    KNEE ARTHROSCOPY      left    KYPHOSIS SURGERY      LAMINECTOMY      LEG SURGERY Left 7/13/2021    INCISION AND DEBRIDEMENT LEFT LOWER LEG WOUND WITH POSSIBLE WOUND VAC PLACEMENT performed by Martin Cruz MD at [address]  02/2020    MEDIASTINOSCOPY N/A 05/05/2021    MEDIASTINAL EXPLORATION AND EVACUATION OF HEMATOMA performed by Mark Miller MD at [address]  01/08/2021    sacral wound debridement    PRESSURE ULCER DEBRIDEMENT N/A 01/08/2021    SACRAL WOUND DEBRIDEMENT performed by Laura Nolan MD at [facility] SEPTOPLASTY  05/07/2013    FESS with balloon    SPINAL FUSION      TRANSESOPHAGEAL ECHOCARDIOGRAM  11/02/2021    TUBAL LIGATION      UPPER GASTROINTESTINAL ENDOSCOPY  04/08/2014    Dilitation     FAMILY HISTORY:  family history includes Asthma in an other family member; Diabetes in her brother, mother, and sister; Heart Disease in her brother and brother; Hypertension in her mother. SOCIAL HISTORY:   reports that she quit smoking about 30 years ago. Her smoking use included cigarettes. She has a 20.00 pack-year smoking history. She has never used smokeless tobacco.    Scheduled Meds:   sodium chloride  30 mL/kg IntraVENous Once    vancomycin  1,250 mg IntraVENous Once    magnesium sulfate  1,000 mg IntraVENous Once     ALLERGIES:  Patient is allergic to atenolol.     REVIEW OF SYSTEMS:  Constitutional: Negative for fever + fatigue  HENT: Negative for sore throat  Eyes: Negative for redness   Respiratory: + for progressive dyspnea, + productive cough  Cardiovascular: Negative for chest pain  Gastrointestinal: Negative for vomiting, diarrhea   Genitourinary: Negative for hematuria Musculoskeletal: Negative for arthralgias   Skin: Negative for rash  Neurological: weakness  Hematological: Negative for adenopathy  Psychiatric/Behavorial: Negative for anxiety    PHYSICAL EXAM:  Blood pressure 130/81, pulse 74, temperature 98.3 °F (36.8 °C), temperature source Tympanic, resp. rate 12, height 5' 8\" (1.727 m), weight 126 lb (57.2 kg), SpO2 97 %.' on 5 L  Gen:  No distress. Eyes:  PERRL. No sclera icterus. No conjunctival injection. ENT:  No discharge. Pharynx clear. Neck:  Trachea midline. No obvious mass. Resp:  No accessory muscle use. + crackles. No wheezes. + rhonchi. No dullness on percussion. CV:  Regular rate. Regular rhythm. No murmur or rub. No edema. Peripheral pulses are 2+. Capillary refill is less than 3 seconds. GI:  Non-tender. Non-distended. No hernia. Skin:  Warm and dry. No nodule on exposed extremities. Lymph:  No cervical LAD. No supraclavicular LAD. M/S:  No cyanosis. No joint deformity. No clubbing. Neuro:  Awake. Alert. Moves all four extremities. Psych:  Oriented x 3. No anxiety. LABS:  CBC:   Recent Labs     12/20/21  1304   WBC 5.8   HGB 11.8*   HCT 35.9*   MCV 93.5        BMP:   Recent Labs     12/20/21  1255   *   K 3.0*   CL 86*   CO2 32   BUN 24*   CREATININE 1.0     LIVER PROFILE:   Recent Labs     12/20/21  1255   AST 9*   ALT 10   BILITOT 0.8   ALKPHOS 115     PT/INR:   Recent Labs     12/20/21  1255   PROTIME 10.6   INR 0.93     APTT:   Recent Labs     12/20/21  1255   APTT 22.9*     UA:No results for input(s): NITRITE, COLORU, PHUR, LABCAST, WBCUA, RBCUA, MUCUS, TRICHOMONAS, YEAST, BACTERIA, CLARITYU, SPECGRAV, LEUKOCYTESUR, UROBILINOGEN, BILIRUBINUR, BLOODU, GLUCOSEU, AMORPHOUS in the last 72 hours. Invalid input(s): KETONESU  No results for input(s): PHART, [id number], PO2ART in the last 72 hours.     Micro:  12/13/2021 SARS-CoV-2 positive  12/20/2021 Procal 1.33    Imaging: Chest imaging was reviewed by me and showed CTPA 12/13/2021  1. No evidence of pulmonary embolism or acute pulmonary abnormality. 2. Scarring at the right lung base    CXR 12/20/2021   Increased patchy opacities in the right lung which may reflect airspace   disease superimposed on background emphysematous changes. ASSESSMENT:  · Acute hypoxic respiratory failure  · HCAP  · COVID-19 pneumonia in a fully vaccinated individual, 8 days since sx onset  · Lactic acidosis  · COPD with bronchiectasis   · Tracheomalacia    · RA on Enbrel, MTX, & Prednisone  · CHF  · CAD s/p CABG 5/3/21  · DANIELLE, mild - on CPAP  · Former smoker, 20 pack-yr hx, quit 1991    PLAN:   COVID-19 isolation, droplet plus   Supplemental O2 to maintain SaO2 >92%; monitor sats closely    Decadron D#8, 6 mg daily (3 days inpatient, 4 days outpatient)   o No baricitinib or tocilizumab with untreated severe infection    Vancomycin/Cefepime D#1 for HCAP, monitoring of vancomycin levels to prevent toxicity.  Is on daily Azithromycin. Daliresp qd.     Inhaled bronchodilators only as needed, MDI preferred    Prophylaxis: Lovenox

## 2021-12-20 NOTE — ED NOTES
Critical lab result received. Lactic acid 4.2. Heri Cordon, notified.       Yue Liao, ABRAHAM  12/20/21 6016

## 2021-12-20 NOTE — CARE COORDINATION
Readmission Assessment  Number of Days since last admission?: 1-7 days  Previous Disposition: Home with Family  Who is being Interviewed: Caregiver  What was the patient's/caregiver's perception as to why they think they needed to return back to the hospital?: Other (Comment) (worsening hypoxia (DC'd on O2))  Did you visit your Primary Care Physician after you left the hospital, before you returned this time?: No  Did you see a specialist, such as Cardiac, Pulmonary, Orthopedic Physician, etc. after you left the hospital?: No  Who advised the patient to return to the hospital?: [facility]  Does the patient report anything that got in the way of taking their medications?: No  In our efforts to provide the best possible care to you and others like you, can you think of anything that we could have done to help you after you left the hospital the first time, so that you might not have needed to return so soon?: Other (Comment) (Resume home care with [facility] and Home O2 w/ [facility].)    Case Management Assessment  Initial Evaluation      Patient Name: Ricardo Carmona  YOB: 1950  Diagnosis: Acute on chronic respiratory failure with hypoxia [facility]) [J96.21]  Date / Time: 12/20/2021 12:44 PM    Admission status/Date:  12/20/2021  Chart Reviewed: Yes      Patient Interviewed: Hoang Rowan:  YES    Hospitalization in the last 30 days:  Yes      Health Care Decision Maker :   [facility] - [phone]    Secondary Decision Maker: Taran Cervantes  Child - [phone]    (CM - must 1st enter selection under Navigator - emergency contact- [place] Relationship and pick relationship)   Who do you trust or have selected to make healthcare decisions for you    Current PCP: [address] [place] [address] Avenue required for SNF : Y, N          3 night stay required -  [place]  Support Systems/Care Needs:    Transportation: family    Meal Preparation: self/dtr    Housing  Living Arrangements: Was recovering at her daughters home ([place])  Steps:   Intent for return to present living arrangements: Yes  Identified Issues:     [address] with [address] : Yes - [facility] - SN/PT/OT     Passport/Waiver : No  :                      Phone Number:    Passport/Waiver Services: NA          Durable Medical Equiptment   DME Provider: Nicole  Equipment:   Walker___Cane___RTS___ BSC___Shower Chair___Hospital Bed___W/C____Other________  02 at _2__Liter(s)---wears(frequency)_cont______ HHN ___ CPAP___ BiPap___   N/A____      Home O2 Use :  Yes  Increased O2 demand      Community Service Affiliation  Dialysis:  No    · Agency:  · Location:  · Dialysis Schedule:  · Phone:   · Fax: Other Community Services: (ex:PT/OT,Mental Health,Wound Clinic, Cardio/Pul [address])    DISCHARGE PLAN: Explained Case Management role/services. Chart reviewed. Met with pt's daughter by phone and explained the role of the CM. Pt was recovering from [place] at home with her daughter. +24/7 supervision and assistance. She was released last week with home O2 and HHC with [facility]. Plans to return home with same. CM will follow for new orders.

## 2021-12-21 LAB
ANION GAP SERPL CALCULATED.3IONS-SCNC: 10 MMOL/L (ref 3–16)
BASOPHILS ABSOLUTE: 0 K/UL (ref 0–0.2)
BASOPHILS RELATIVE PERCENT: 0 %
BUN BLDV-MCNC: 21 MG/DL (ref 7–20)
CALCIUM SERPL-MCNC: 8.5 MG/DL (ref 8.3–10.6)
CHLORIDE BLD-SCNC: 91 MMOL/L (ref 99–110)
CO2: 32 MMOL/L (ref 21–32)
CREAT SERPL-MCNC: 0.8 MG/DL (ref 0.6–1.2)
EOSINOPHILS ABSOLUTE: 0 K/UL (ref 0–0.6)
EOSINOPHILS RELATIVE PERCENT: 0 %
ESTIMATED AVERAGE GLUCOSE: 174.3 MG/DL
GFR AFRICAN AMERICAN: >60
GFR NON-AFRICAN AMERICAN: >60
GLUCOSE BLD-MCNC: 216 MG/DL (ref 70–99)
GLUCOSE BLD-MCNC: 229 MG/DL (ref 70–99)
GLUCOSE BLD-MCNC: 230 MG/DL (ref 70–99)
GLUCOSE BLD-MCNC: 248 MG/DL (ref 70–99)
GLUCOSE BLD-MCNC: 259 MG/DL (ref 70–99)
GLUCOSE BLD-MCNC: 345 MG/DL (ref 70–99)
HBA1C MFR BLD: 7.7 %
HCT VFR BLD CALC: 31.9 % (ref 36–48)
HEMOGLOBIN: 10.8 G/DL (ref 12–16)
LYMPHOCYTES ABSOLUTE: 0.4 K/UL (ref 1–5.1)
LYMPHOCYTES RELATIVE PERCENT: 6.3 %
MCH RBC QN AUTO: 30.5 PG (ref 26–34)
MCHC RBC AUTO-ENTMCNC: 34 G/DL (ref 31–36)
MCV RBC AUTO: 89.8 FL (ref 80–100)
MONOCYTES ABSOLUTE: 0.4 K/UL (ref 0–1.3)
MONOCYTES RELATIVE PERCENT: 6.3 %
NEUTROPHILS ABSOLUTE: 5 K/UL (ref 1.7–7.7)
NEUTROPHILS RELATIVE PERCENT: 87.4 %
PDW BLD-RTO: 14.2 % (ref 12.4–15.4)
PERFORMED ON: ABNORMAL
PLATELET # BLD: 229 K/UL (ref 135–450)
PMV BLD AUTO: 7.6 FL (ref 5–10.5)
POTASSIUM REFLEX MAGNESIUM: 4.1 MMOL/L (ref 3.5–5.1)
RBC # BLD: 3.55 M/UL (ref 4–5.2)
SODIUM BLD-SCNC: 133 MMOL/L (ref 136–145)
WBC # BLD: 5.7 K/UL (ref 4–11)

## 2021-12-21 PROCEDURE — 2700000000 HC OXYGEN THERAPY PER DAY

## 2021-12-21 PROCEDURE — 2060000000 HC ICU INTERMEDIATE R&B

## 2021-12-21 PROCEDURE — 85025 COMPLETE CBC W/AUTO DIFF WBC: CPT

## 2021-12-21 PROCEDURE — 36415 COLL VENOUS BLD VENIPUNCTURE: CPT

## 2021-12-21 PROCEDURE — 80048 BASIC METABOLIC PNL TOTAL CA: CPT

## 2021-12-21 PROCEDURE — 6360000002 HC RX W HCPCS: Performed by: NURSE PRACTITIONER

## 2021-12-21 PROCEDURE — 99223 1ST HOSP IP/OBS HIGH 75: CPT | Performed by: INTERNAL MEDICINE

## 2021-12-21 PROCEDURE — 2580000003 HC RX 258: Performed by: NURSE PRACTITIONER

## 2021-12-21 PROCEDURE — 6370000000 HC RX 637 (ALT 250 FOR IP): Performed by: NURSE PRACTITIONER

## 2021-12-21 PROCEDURE — 94761 N-INVAS EAR/PLS OXIMETRY MLT: CPT

## 2021-12-21 PROCEDURE — 99233 SBSQ HOSP IP/OBS HIGH 50: CPT | Performed by: INTERNAL MEDICINE

## 2021-12-21 RX ADMIN — ENOXAPARIN SODIUM 30 MG: 100 INJECTION SUBCUTANEOUS at 20:56

## 2021-12-21 RX ADMIN — INSULIN LISPRO 4 UNITS: 100 INJECTION, SOLUTION INTRAVENOUS; SUBCUTANEOUS at 11:12

## 2021-12-21 RX ADMIN — PANTOPRAZOLE SODIUM 40 MG: 40 TABLET, DELAYED RELEASE ORAL at 06:09

## 2021-12-21 RX ADMIN — SPIRONOLACTONE 25 MG: 25 TABLET ORAL at 10:59

## 2021-12-21 RX ADMIN — GABAPENTIN 300 MG: 300 CAPSULE ORAL at 10:59

## 2021-12-21 RX ADMIN — MORPHINE SULFATE 15 MG: 15 TABLET, FILM COATED, EXTENDED RELEASE ORAL at 10:59

## 2021-12-21 RX ADMIN — SODIUM CHLORIDE, PRESERVATIVE FREE 10 ML: 5 INJECTION INTRAVENOUS at 20:57

## 2021-12-21 RX ADMIN — IPRATROPIUM BROMIDE 1 SPRAY: 42 SPRAY NASAL at 20:58

## 2021-12-21 RX ADMIN — ATORVASTATIN CALCIUM 40 MG: 40 TABLET, FILM COATED ORAL at 20:56

## 2021-12-21 RX ADMIN — GABAPENTIN 300 MG: 300 CAPSULE ORAL at 20:56

## 2021-12-21 RX ADMIN — INSULIN LISPRO 4 UNITS: 100 INJECTION, SOLUTION INTRAVENOUS; SUBCUTANEOUS at 08:13

## 2021-12-21 RX ADMIN — MIDODRINE HYDROCHLORIDE 2.5 MG: 5 TABLET ORAL at 14:27

## 2021-12-21 RX ADMIN — CEFEPIME 2000 MG: 2 INJECTION, POWDER, FOR SOLUTION INTRAVENOUS at 03:43

## 2021-12-21 RX ADMIN — ASPIRIN 81 MG: 81 TABLET, COATED ORAL at 10:58

## 2021-12-21 RX ADMIN — TORSEMIDE 20 MG: 20 TABLET ORAL at 10:59

## 2021-12-21 RX ADMIN — SODIUM CHLORIDE, PRESERVATIVE FREE 10 ML: 5 INJECTION INTRAVENOUS at 08:12

## 2021-12-21 RX ADMIN — LATANOPROST 1 DROP: 50 SOLUTION OPHTHALMIC at 20:58

## 2021-12-21 RX ADMIN — MORPHINE SULFATE 15 MG: 15 TABLET, FILM COATED, EXTENDED RELEASE ORAL at 20:56

## 2021-12-21 RX ADMIN — METOPROLOL SUCCINATE 12.5 MG: 25 TABLET, EXTENDED RELEASE ORAL at 10:58

## 2021-12-21 RX ADMIN — CLOPIDOGREL BISULFATE 75 MG: 75 TABLET ORAL at 10:59

## 2021-12-21 RX ADMIN — VANCOMYCIN HYDROCHLORIDE 1000 MG: 1 INJECTION, POWDER, LYOPHILIZED, FOR SOLUTION INTRAVENOUS at 18:36

## 2021-12-21 RX ADMIN — DEXAMETHASONE SODIUM PHOSPHATE 6 MG: 10 INJECTION, SOLUTION INTRAMUSCULAR; INTRAVENOUS at 10:58

## 2021-12-21 RX ADMIN — SODIUM CHLORIDE: 9 INJECTION, SOLUTION INTRAVENOUS at 03:41

## 2021-12-21 RX ADMIN — MIDODRINE HYDROCHLORIDE 2.5 MG: 5 TABLET ORAL at 10:59

## 2021-12-21 RX ADMIN — INSULIN LISPRO 4 UNITS: 100 INJECTION, SOLUTION INTRAVENOUS; SUBCUTANEOUS at 21:02

## 2021-12-21 RX ADMIN — ROFLUMILAST 500 MCG: 500 TABLET ORAL at 10:58

## 2021-12-21 RX ADMIN — INSULIN LISPRO 4 UNITS: 100 INJECTION, SOLUTION INTRAVENOUS; SUBCUTANEOUS at 16:24

## 2021-12-21 RX ADMIN — MIDODRINE HYDROCHLORIDE 2.5 MG: 5 TABLET ORAL at 20:56

## 2021-12-21 RX ADMIN — DULOXETINE HYDROCHLORIDE 60 MG: 60 CAPSULE, DELAYED RELEASE ORAL at 10:59

## 2021-12-21 RX ADMIN — INSULIN GLARGINE 15 UNITS: 100 INJECTION, SOLUTION SUBCUTANEOUS at 21:02

## 2021-12-21 RX ADMIN — IPRATROPIUM BROMIDE 1 SPRAY: 42 SPRAY NASAL at 11:00

## 2021-12-21 RX ADMIN — CEFEPIME 2000 MG: 2 INJECTION, POWDER, FOR SOLUTION INTRAVENOUS at 14:31

## 2021-12-21 RX ADMIN — ENOXAPARIN SODIUM 30 MG: 100 INJECTION SUBCUTANEOUS at 10:57

## 2021-12-21 RX ADMIN — CETIRIZINE HYDROCHLORIDE 10 MG: 10 TABLET ORAL at 10:59

## 2021-12-21 RX ADMIN — FLUTICASONE PROPIONATE 1 SPRAY: 50 SPRAY, METERED NASAL at 11:00

## 2021-12-21 RX ADMIN — CALCIUM CARBONATE (ANTACID) CHEW TAB 500 MG 500 MG: 500 CHEW TAB at 10:58

## 2021-12-21 ASSESSMENT — PAIN SCALES - GENERAL
PAINLEVEL_OUTOF10: 8
PAINLEVEL_OUTOF10: 7
PAINLEVEL_OUTOF10: 7

## 2021-12-21 ASSESSMENT — PAIN DESCRIPTION - PAIN TYPE: TYPE: CHRONIC PAIN

## 2021-12-21 ASSESSMENT — PAIN DESCRIPTION - DESCRIPTORS: DESCRIPTORS: ACHING

## 2021-12-21 ASSESSMENT — PAIN DESCRIPTION - FREQUENCY: FREQUENCY: CONTINUOUS

## 2021-12-21 ASSESSMENT — PAIN DESCRIPTION - ORIENTATION: ORIENTATION: LOWER

## 2021-12-21 ASSESSMENT — PAIN DESCRIPTION - ONSET: ONSET: ON-GOING

## 2021-12-21 ASSESSMENT — PAIN DESCRIPTION - LOCATION: LOCATION: BACK

## 2021-12-21 NOTE — PROGRESS NOTES
Pulmonary Progress Note  CC: COVID-19 and respiratory failure in a vaccinated pt. Subjective:  Feels better today. No sputum. No fever      EXAM: BP (!) 108/52   Pulse 77   Temp 98 °F (36.7 °C) (Oral)   Resp 16   Ht 5' 8\" (1.727 m)   Wt 125 lb 9.6 oz (57 kg)   SpO2 97%   BMI 19.10 kg/m²  on 2 L  Constitutional:  No acute distress. Eyes: PERRL. Conjunctivae anicteric. ENT: Normal nose. Normal tongue. Neck:  Trachea is midline. No thyroid tenderness. Respiratory: No accessory muscle usage. decreased breath sounds. No wheezes. No rales. No Rhonchi. Cardiovascular: Normal S1S2. No digit clubbing. No digit cyanosis. No LE edema. Psychiatric: No anxiety or Agitation. Alert and Oriented to person, place and time.     Scheduled Meds:   cefepime  2,000 mg IntraVENous Q12H    aspirin EC  81 mg Oral Daily    atorvastatin  40 mg Oral Nightly    calcium carbonate  500 mg Oral Daily    cetirizine  10 mg Oral Daily    clopidogrel  75 mg Oral Daily    Roflumilast  500 mcg Oral Daily    DULoxetine  60 mg Oral Daily    fluticasone  1 spray Each Nostril Daily    gabapentin  300 mg Oral BID    insulin glargine  15 Units SubCUTAneous Nightly    ipratropium  1 spray Each Nostril BID    latanoprost  1 drop Both Eyes Nightly    metoprolol succinate  12.5 mg Oral Daily    midodrine  2.5 mg Oral TID    morphine  15 mg Oral BID    pantoprazole  40 mg Oral QAM AC    spironolactone  25 mg Oral Daily    torsemide  20 mg Oral Daily    sodium chloride flush  5-40 mL IntraVENous 2 times per day    enoxaparin  30 mg SubCUTAneous BID    insulin lispro  0-12 Units SubCUTAneous TID WC    insulin lispro  0-6 Units SubCUTAneous Nightly    dexamethasone  6 mg IntraVENous Daily    vancomycin  1,000 mg IntraVENous Q24H     Continuous Infusions:   dextrose      sodium chloride      sodium chloride 50 mL/hr at 12/21/21 0341     PRN Meds:  albuterol sulfate HFA, cyclobenzaprine, glucose, dextrose, glucagon

## 2021-12-21 NOTE — H&P
Hospital Medicine History & Physical      PCP: Juan M Palacios MD    Date of Admission: 12/20/2021    Date of Service: Pt seen/examined on  12/21/2021     Chief Complaint:    Chief Complaint   Patient presents with    Positive For Covid-19     pt states COVID positive 1 week ago, was sent home on oxygen. pt states home health nurse wanted her seen again due to oxygen being upper 80s-90 today       History Of Present Illness: The patient is a 80 y.o. female with COPD, CHF, bronchiectasis, DANIELLE, COVID 19 tested + 12/13/21 and admitted to [facility] for hypoxia 2/2 COVID 19 and CHF discharged to home on 12/16 who presented to the ED with complaint of shortness of breath. Patient states that she did well for a couple of days after discharge but then started getting increasing dyspnea. Associated with cough and some sputum production. No fevers. O2 sats dropped to 79% at home. Patient presented to the ER. Readmitted for hypoxemia. Required 6 L of oxygen on presentation. Currently O2 weaned back to 2 L. Work-up suggest possible superimposed pneumonia patient started on broad-spectrum IV antibiotics. She is feeling better today.     She has been vaccinated for Covid    Past Medical History:        Diagnosis Date    Acute on chronic diastolic heart failure due to coronary artery disease (HCC)     Acute respiratory failure with hypoxia (HCC)     Atherosclerosis of native artery of right lower extremity with rest pain ([address] 07/25/2017    Back pain     Branch retinal vein occlusion 07/20/2012    Bronchiectasis with acute exacerbation (HCC)     Cellulitis and abscess of left leg 7/11/2021    Cellulitis of left lower extremity 7/11/2021    S/P vein harvest 05/2021 for CABG    CHF (congestive heart failure) (HCC)     Closed compression fracture of thoracic vertebra ([address] 01/15/2020    Closed fracture of facial bone with routine healing 11/21/2016    Closed jaw fracture ([address] 01/15/2020    Community acquired pneumonia of left lower lobe of lung     Compression fracture of L1 lumbar vertebra ([address] 01/15/2020    COPD (chronic obstructive pulmonary disease) (HCC)     Fracture of tibial plateau, closed, left, initial encounter 12/05/2017    Minimally displaced zone I fracture of sacrum (HCC) 09/02/2020    MRSA (methicillin resistant staph aureus) culture positive 07/2021    MRSA (methicillin resistant Staphylococcus aureus) 07/13/2021    wound    Mucus plugging of bronchi     NSTEMI (non-ST elevated myocardial infarction) ([address] 4/23/2021    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 Bone density test last year showed severe osteoporosis. Recently, two broken vertebrae (L1, L2) due to coughing. Diagnosed with tracheomalacia and stated she must cough very hard to clear phlegm. Was coughing due to upper respiratory infections which have been treated. Hx of laminectomy and recent kyphoplasty.    Refractured her jawbone which was previously repaired wi    Post herpetic neuralgia     Proximal humerus fracture 10/01/2019    Rheumatoid arthritis ([address]     Shingles 05/2020    Sleep apnea     Status post incision and drainage 07/2021    Left Leg    Surgical wound dehiscence, initial encounter (at [facility] harvest site) 7/12/2021    Temporal arteritis ([address] 07/10/2013    Tobacco use 10/19/2017    Tracheomalacia     Vitreous hemorrhage, right eye ([address] 02/21/2020       Past Surgical History:        Procedure Laterality Date    ARTERY BIOPSY Right 03/01/2021    at [address]  05/30/2013    left temporal artery biopsy    BACK SURGERY  08/2020    BRONCHOSCOPY      BRONCHOSCOPY N/A 06/12/2019    BRONCHOSCOPY ALVEOLAR LAVAGE performed by Priya Lemus MD at [address]  05/03/2021    CATARACT REMOVAL      COLONOSCOPY  CORONARY ARTERY BYPASS GRAFT N/A 05/03/2021    CORONARY ARTERY BYPASS X3 WITH LEFT ATRIAL APPENDAGE CLIP, 5 LEVEL BILATERAL INTERCOSTAL NERVE BLOCK, STERNAL PLATING performed by Cate Perez MD at [address]      IR MIDLINE CATH  7/14/2021    IR MIDLINE CATH 7/14/2021 [facility] SPECIAL PROCEDURES    KNEE ARTHROSCOPY      left    KYPHOSIS SURGERY      LAMINECTOMY      LEG SURGERY Left 7/13/2021    INCISION AND DEBRIDEMENT LEFT LOWER LEG WOUND WITH POSSIBLE WOUND VAC PLACEMENT performed by [address], MD at [address]  02/2020    MEDIASTINOSCOPY N/A 05/05/2021    MEDIASTINAL EXPLORATION AND EVACUATION OF HEMATOMA performed by Cate Perez MD at [address]  01/08/2021    sacral wound debridement    PRESSURE ULCER DEBRIDEMENT N/A 01/08/2021    SACRAL WOUND DEBRIDEMENT performed by Elke Jenkins MD at [facility]  05/07/2013    FESS with balloon    SPINAL FUSION      TRANSESOPHAGEAL ECHOCARDIOGRAM  11/02/2021    TUBAL LIGATION      UPPER GASTROINTESTINAL ENDOSCOPY  04/08/2014    Dilitation       Medications Prior to Admission:    Prior to Admission medications    Medication Sig Start Date End Date Taking?  Authorizing Provider   dexamethasone (DECADRON) 6 MG tablet Take 1 tablet by mouth daily (with breakfast) for 7 days 12/16/21 12/23/21  Florin Torres MD   etanercept (ENBREL) 50 MG/ML injection Inject 25 mg into the skin once Once weekly on Wednesday    Historical Provider, MD   torsemide (DEMADEX) 20 MG tablet Take 2 tablets by mouth daily If weight 131 lbs or less, decrease to 20 mg daily 9/17/21   LORENZO Parker CNP   metoprolol succinate (TOPROL XL) 25 MG extended release tablet Take 0.5 tablets by mouth daily 9/17/21   LORENZO Parker CNP   spironolactone (ALDACTONE) 25 MG tablet Take 1 tablet by mouth daily 9/16/21 LORENZO Jean CNP   midodrine (PROAMATINE) 2.5 MG tablet Take 1 tablet by mouth 3 times daily 9/16/21   LORENZO Jean CNP   DULoxetine (CYMBALTA) 60 MG extended release capsule Take 60 mg by mouth daily    Historical Provider, MD   DALIRESP 500 MCG tablet TAKE 1 TABLET BY MOUTH DAILY 6/23/21   Roel Martin MD   clopidogrel (PLAVIX) 75 MG tablet Take 1 tablet by mouth daily 6/10/21   Booker Grayson MD   predniSONE (DELTASONE) 1 MG tablet Take 4 mg by mouth daily    Historical Provider, MD   insulin glargine (LANTUS) 100 UNIT/ML injection vial Inject 15 Units into the skin nightly 5/10/21   LORENZO Massey CNP   oxyCODONE-acetaminophen (PERCOCET)  MG per tablet Take 1 tablet by mouth daily. Historical Provider, MD   docusate sodium (COLACE) 100 MG capsule Take 100 mg by mouth 2 times daily as needed for Constipation    Historical Provider, MD   gabapentin (NEURONTIN) 300 MG capsule Take 300 mg by mouth 2 times daily. Historical Provider, MD   latanoprost (XALATAN) 0.005 % ophthalmic solution Place 1 drop into both eyes nightly    Historical Provider, MD   Teriparatide, Recombinant, (FORTEO) 600 MCG/2.4ML SOPN injection Inject 20 mcg into the skin daily    Historical Provider, MD   NARCAN 4 MG/0.1ML LIQD nasal spray as needed  10/20/20   Historical Provider, MD   morphine (MS CONTIN) 15 MG extended release tablet 15 mg 2 times daily.   10/16/20   Historical Provider, MD   ipratropium (ATROVENT) 0.06 % nasal spray USE 2 SPRAYS BY NASAL ROUTE 2-4 TIMES DAILY 10/29/20   Roel Martin MD   albuterol sulfate  (90 Base) MCG/ACT inhaler INHALE 2 PUFFS INTO THE LUNGS EVERY 4 HOURS AS NEEDED FOR WHEEZING 1/20/20   Kirsten Martinez MD   vitamin D (CHOLECALCIFEROL) 1000 UNIT TABS tablet Take 5,000 Units by mouth daily     Historical Provider, MD   calcium carbonate (OSCAL) 500 MG TABS tablet Take 500 mg by mouth daily    Historical Provider, MD   atorvastatin (LIPITOR) 40 MG tablet Take 40 mg by mouth 10/16/18   Historical Provider, MD   cyclobenzaprine (FLEXERIL) 10 MG tablet Take 10 mg by mouth 2 times daily as needed     Historical Provider, MD   Insulin Syringe-Needle U-100 31G X 5/16\" 0.5 ML MISC USE 5 TIMES DAILY 5/30/18   Historical ProviderMD Lory strip TEST 4 TIMES DAILY 6/1/18   Historical Provider, MD   aspirin EC 81 MG EC tablet Take 1 tablet by mouth daily 10/23/17   Roseanna Granda MD   insulin lispro (HUMALOG) 100 UNIT/ML injection vial Inject 0-12 Units into the skin 3 times daily (with meals) 10/23/17   Roseanna Granda MD   Misc. Devices (ACAPELLA) MISC Take 1 Device by mouth as needed 9/2/15   Roque Garcia, LORENZO - CNP   fluticasone [facility]) 50 MCG/ACT nasal spray INHALE 2 SPRAYS IN [facility] NOSTRIL DAILY 11/25/13   Lalita Peterson MD   cetirizine (ZYRTEC) 10 MG tablet Take 10 mg by mouth daily. Historical Provider, MD   omeprazole (PRILOSEC) 40 MG capsule Take 40 mg by mouth daily     Historical Provider, MD       Allergies:  Atenolol    Social History:  The patient currently lives at home    TOBACCO:   reports that she quit smoking about 30 years ago. Her smoking use included cigarettes. She has a 20.00 pack-year smoking history. She has never used smokeless tobacco.  ETOH:   reports previous alcohol use.       Family History:   Positive as follows:        Problem Relation Age of Onset    Diabetes Mother     Hypertension Mother     Asthma Other     Heart Disease Brother     Diabetes Brother     Diabetes Sister     Heart Disease Brother     Cancer Neg Hx     Emphysema Neg Hx     Heart Failure Neg Hx        REVIEW OF SYSTEMS:     Constitutional: Negative for fever   HENT: Negative for sore throat   Eyes: Negative for redness   Respiratory:  positive for dyspnea, cough   Cardiovascular: Negative for chest pain   Gastrointestinal: Negative for vomiting, diarrhea   Genitourinary: Negative for hematuria   Musculoskeletal: Negative for arthralgias   Skin: Negative for rash   Neurological: Negative for syncope   Hematological: Negative for adenopathy   Psychiatric/Behavorial: Negative for anxiety    PHYSICAL EXAM:    BP (!) 108/52   Pulse 77   Temp 98 °F (36.7 °C) (Oral)   Resp 16   Ht 5' 8\" (1.727 m)   Wt 125 lb 9.6 oz (57 kg)   SpO2 97%   BMI 19.10 kg/m²   Patient in droplet plus precautions  Gen: No distress. Alert. Awake and well-oriented. Appears fatigued and ill  Eyes: PERRL. No sclera icterus. No conjunctival injection. ENT: No discharge. Pharynx clear. Neck: No JVD. No Carotid Bruit. Trachea midline. Resp: No accessory muscle use. Diminished breath sounds with bilateral rhonchi present. CV: Regular rate. Regular rhythm. No murmur. No rub. No edema. Capillary Refill: Brisk,< 3 seconds   Peripheral Pulses: +2 palpable, equal bilaterally   GI: Non-tender. Non-distended. No masses. No organomegaly. Normal bowel sounds. No hernia. Skin: Warm and dry. No nodule on exposed extremities. No rash on exposed extremities. M/S: No cyanosis. No joint deformity. No clubbing. Neuro: Awake. Grossly nonfocal    Psych: Oriented x 3. No anxiety or agitation. CBC:   Recent Labs     12/20/21  1304 12/21/21  0444   WBC 5.8 5.7   HGB 11.8* 10.8*   HCT 35.9* 31.9*   MCV 93.5 89.8    229     BMP:   Recent Labs     12/20/21  1255   *   K 3.0*   CL 86*   CO2 32   BUN 24*   CREATININE 1.0     LIVER PROFILE:   Recent Labs     12/20/21  1255   AST 9*   ALT 10   BILITOT 0.8   ALKPHOS 115     PT/INR:   Recent Labs     12/20/21  1255   PROTIME 10.6   INR 0.93     APTT:   Recent Labs     12/20/21  1255   APTT 22.9*     UA:No results for input(s): NITRITE, COLORU, PHUR, LABCAST, WBCUA, RBCUA, MUCUS, TRICHOMONAS, YEAST, BACTERIA, CLARITYU, SPECGRAV, LEUKOCYTESUR, UROBILINOGEN, BILIRUBINUR, BLOODU, GLUCOSEU, AMORPHOUS in the last 72 hours.     Invalid input(s): KETONESU       CARDIAC ENZYMES  Recent Labs     12/20/21  1255   TROPONINI 0.02*       U/A: Lab Results   Component Value Date    COLORU Yellow 04/26/2021    WBCUA 0-2 04/26/2021    RBCUA 3-4 04/26/2021    MUCUS 1+ 07/06/2019    BACTERIA 1+ 04/23/2021    CLARITYU Clear 04/26/2021    SPECGRAV 1.010 04/26/2021    LEUKOCYTESUR Negative 04/26/2021    BLOODU TRACE-INTACT 04/26/2021    GLUCOSEU Negative 04/26/2021    GLUCOSEU NEGATIVE 03/20/2010       ABG    Lab Results   Component Value Date    OGQ4ZWM 21.8 08/10/2021    BEART -2.0 08/10/2021    P6DSOTWR 84.6 08/10/2021    PHART 7.434 08/10/2021    FUH6RYP 33.3 08/10/2021    PO2ART 46.8 08/10/2021    QBY6TCK 22.8 08/10/2021       CULTURES  Blood: pending     EKG:   Sinus rhythm with occasional Premature ventricular complexes  Possible Left atrial enlargement  Non-specific intra-ventricular conduction delay  Nonspecific ST and T wave abnormality  Abnormal ECG  When compared with ECG of 13-DEC-2021 14:26,  No significant change was found  Confirmed by James Harrison MD, [facility]    RADIOLOGY  XR CHEST PORTABLE   Final Result   Increased patchy opacities in the right lung which may reflect airspace   disease superimposed on background emphysematous changes. ASSESSMENT/PLAN:    ##Acute hypoxic respiratory failure  - 2/2 PNA, COPD, and COVID 19  - patient dc'd after recent admit on 2L  -hypoxic on presentation , with O2 requirement up to 6 L .  she is currently requiring 2L. Continue to monitor O2 sats       #Pneumonia   - Health care acquired: suspect a gram negative organism, also risk for MRSA  -Procalcitonin elevated 1.3  - Treat with antibiotics vanc and cefepime   - check cultures      #COPD with AE  - combivent   -On steroids    #COVID 19  - in a vaccinated patient. She completed 2 dose vaccination with Pfizer in March 2021, has not received a booster dose.   She is immunocompromised on Enbrel and daily prednisone for RA  - symptoms of dyspnea present now for 8 days before this admission  - treated with decadron and remdesivir during most recent admit - decadron resumed  -Submental oxygen as needed  - appreciate pulmonary input   - droplet + isolation      #chronic diastolic CHF  - Home diuretics: Demadex 40 mg daily, spironolactone 25 mg daily- continue     #Hypokalemia   -Repleted     #Debility  -PT OT     #CAD post CABG and stents   #PVD sp right femoral popliteal bypass surgery   - cont ASA, plavix, statin, toprol      #Mild Mitral Regurgitation   - f/b cardio     #Rheumatoid arthritis   - on Enbrel and daily prednisone at home- held      #Stage 4 sacral pressure ulcer  - f/b Dr Kylah You in the [address] [place] [address]  - wound RN consult     #DM2  - with hyperglycemia  - cont Lantus  - use SSI      #DANIELLE on CPAP     #Chronic pain  - cont home morphine and percocet  - cont gabapentin        DVT Prophylaxis: Lovenox  Diet: ADULT DIET;  Regular; 4 carb choices (60 gm/meal)  Code Status: Full Code        Macario Burkett MD  12/21/21

## 2021-12-21 NOTE — PROGRESS NOTES
Patient admitted to room 321 from ED. Patient oriented to room, call light, bed rails, phone, lights and bathroom. Patient instructed about the schedule of the day including: vital sign frequency, lab draws, possible tests, frequency of MD and staff rounds, daily weights, I &O's and prescribed diet. bed alarm in place, patient aware of placement and reason. Telemetry box in place, patient aware of placement and reason. Bed locked, in lowest position, side rails up 2/4, call light within reach. Recliner Assessment  Patient is able to demonstrate the ability to move from a reclining position to an upright position within the recliner. 4 Eyes Skin Assessment     The patient is being assess for   Admission    I agree that 2 RN's have performed a thorough Head to Toe Skin Assessment on the patient. ALL assessment sites listed below have been assessed. Areas assessed for pressure by both nurses:   [x]   Head, Face, and Ears   [x]   Shoulders, Back, and Chest, Abdomen  [x]   Arms, Elbows, and Hands   [x]   Coccyx, Sacrum, and Ischium  [x]   Legs, Feet, and Heels   Stage 4 coccyx  Stage 1 R hip     Skin Assessed Under all Medical Devices by both nurses:  O2 device tubing              All Mepilex Borders were peeled back and area peeked at by both nurses:  Yes  Please list where Mepilex Borders are located:  Coccyx and R hip             **SHARE this note so that the co-signing nurse is able to place an eSignature**    Co-signer eSignature: Electronically signed by Hawa Crespo RN on 12/21/21 at 4:33 AM EST    Does the Patient have Skin Breakdown related to pressure?   Yes [facility] WOUND CARE was Initiated documentation include the Blanca-wound, Wound Assessment, Measurements, Dressing Treatment, Drainage, and Color\",     (Insert Photo here    )         Sven Prevention initiated:  Yes   Wound Care Orders initiated:  Yes      [id number]  nurse consulted for Pressure Injury (Stage 3,4, Unstageable, DTI, NWPT, Complex wounds)and New or Established Ostomies:  Yes      Primary Nurse eSignature: Electronically signed by Ismael Bailey RN on 12/21/21 at 4:32 AM EST

## 2021-12-21 NOTE — FLOWSHEET NOTE
12/21/21 0802   Vital Signs   Temp 98 °F (36.7 °C)   Temp Source Oral   Pulse 77   Heart Rate Source Monitor   Resp 16   BP (!) 108/52   BP Location Left upper arm   Patient Position Lying right side   Level of Consciousness Alert (0)   MEWS Score 1   Patient Currently in Pain Yes   Pain Assessment   Pain Assessment 0-10   Pain Level 7   Pain Type Chronic pain   Pain Location Back   Pain Descriptors Aching   Pain Frequency Continuous   Pain Onset On-going   Pain Orientation Lower   Oxygen Therapy   SpO2 97 %   O2 Device Nasal cannula   O2 Flow Rate (L/min) 2 L/min       Shift assessment complete, see flowsheet. Pt A&O x4. Denies feelings of SOB at this time. No further needs expressed by pt. Call light left within reach. Bed in lowest position, with alarm on.

## 2021-12-21 NOTE — FLOWSHEET NOTE
12/21/21 0330   Vital Signs   Temp 97.6 °F (36.4 °C)   Temp Source Oral   Pulse 73   Heart Rate Source Monitor   Resp 16   BP (!) 100/53   BP Location Left upper arm   Level of Consciousness Alert (0)   MEWS Score 2     Pt awake in bed. VS as shown above. Pt denies any further needs at this time. Call light in reach and bed in lowest position. Will continue to monitor.

## 2021-12-21 NOTE — PROGRESS NOTES
4 Eyes Skin Assessment     The patient is being assess for   Shift Handoff    I agree that 2 RN's have performed a thorough Head to Toe Skin Assessment on the patient. ALL assessment sites listed below have been assessed. Areas assessed for pressure by both nurses:   [x]   Head, Face, and Ears   [x]   Shoulders, Back, and Chest, Abdomen  [x]   Arms, Elbows, and Hands   [x]   Coccyx, Sacrum, and Ischium  [x]   Legs, Feet, and Heels    Stage 4 Coccyx  Stage 1 Right Hip  DTI Left Heel        Skin Assessed Under all Medical Devices by both nurses:  O2 device tubing and purewick              All Mepilex Borders were peeled back and area peeked at by both nurses:  Yes  Please list where Mepilex Borders are located:  Coccyx, Rt Hip, Lt Heel             **SHARE this note so that the co-signing nurse is able to place an eSignature**    Co-signer eSignature: Electronically signed by Ponce Busch RN on 12/21/21 at 8:13 PM EST    Does the Patient have Skin Breakdown related to pressure?   Yes [facility] WOUND CARE was Initiated documentation include the Blanca-wound, Wound Assessment, Measurements, Dressing Treatment, Drainage, and Color\",            Sven Prevention initiated:  Yes   Wound Care Orders initiated:  Yes      [id number] nurse consulted for Pressure Injury (Stage 3,4, Unstageable, DTI, NWPT, Complex wounds)and New or Established Ostomies:  Yes      Primary Nurse eSignature: Electronically signed by Deonte Hawthorne RN on 12/21/21 at 12:35 PM EST

## 2021-12-21 NOTE — CONSULTS
[facility] Nurse  Consult Note       NAME:  Cinthya Baker  MEDICAL RECORD NUMBER:  [id number]  AGE: 80 y.o. GENDER: female  : 1950  TODAY'S DATE:  2021    Subjective Pt is alert, oriented and pleasant   Reason for WOCN Evaluation and Assessment: Sacrum, left heel, right hip      Cinthya Baker is a 80 y.o. female referred by:   [x] Physician  [x] Nursing  [] Other:     Wound Identification:  Wound Type: pressure  Contributing Factors: chronic pressure and decreased mobility    Pt seen for pressure inujries to the sacrum, right hip and calloused, non-pressure area to left heel. Pt has had sacral injury since 2017 and is followed by Dr Raven Walters. Pt with recent admission one week ago for Covid PNA and was admitted with new area to the right hip, Dr Raven Walters unaware of new area per pt. Pt has not returned to clinic due to hospital admissions, last seen . Pt lives at home and support from her family. Pt prefers not to have SELENA mattress due to getting up to [facility] and difficulty getting ut of it. Pt able to tunn self in bed without difficulty.   Pt prefers foam dressing to left heel for relief from rubbing on sheets    Patient Goal of Care:  [x] Wound Healing  [] Odor Control  [] Palliative Care  [] Pain Control   [] Other:         PAST MEDICAL HISTORY        Diagnosis Date    Acute on chronic diastolic heart failure due to coronary artery disease ([facility])     Acute respiratory failure with hypoxia ([facility])     Atherosclerosis of native artery of right lower extremity with rest pain ([facility] 2017    Back pain     Branch retinal vein occlusion 2012    Bronchiectasis with acute exacerbation ([facility])     Cellulitis and abscess of left leg 2021    Cellulitis of left lower extremity 2021    S/P vein harvest 2021 for CABG    CHF (congestive heart failure) ([facility])     Closed compression fracture of thoracic vertebra ([facility] 01/15/2020    Closed fracture of facial bone with routine healing 11/21/2016    Closed jaw fracture ([address] 01/15/2020    Community acquired pneumonia of left lower lobe of lung     Compression fracture of L1 lumbar vertebra ([address] 01/15/2020    COPD (chronic obstructive pulmonary disease) (HCC)     Fracture of tibial plateau, closed, left, initial encounter 12/05/2017    Minimally displaced zone I fracture of sacrum (HCC) 09/02/2020    MRSA (methicillin resistant staph aureus) culture positive 07/2021    MRSA (methicillin resistant Staphylococcus aureus) 07/13/2021    wound    Mucus plugging of bronchi     NSTEMI (non-ST elevated myocardial infarction) ([address] 4/23/2021    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 Bone density test last year showed severe osteoporosis. Recently, two broken vertebrae (L1, L2) due to coughing. Diagnosed with tracheomalacia and stated she must cough very hard to clear phlegm. Was coughing due to upper respiratory infections which have been treated. Hx of laminectomy and recent kyphoplasty.    Refractured her jawbone which was previously repaired wi    Post herpetic neuralgia     Proximal humerus fracture 10/01/2019    Rheumatoid arthritis ([address]     Shingles 05/2020    Sleep apnea     Status post incision and drainage 07/2021    Left Leg    Surgical wound dehiscence, initial encounter (at [facility] harvest site) 7/12/2021    Temporal arteritis ([address] 07/10/2013    Tobacco use 10/19/2017    Tracheomalacia     Vitreous hemorrhage, right eye ([address] 02/21/2020       PAST SURGICAL HISTORY    Past Surgical History:   Procedure Laterality Date    ARTERY BIOPSY Right 03/01/2021    at [address]  05/30/2013    left temporal artery biopsy    BACK SURGERY  08/2020    BRONCHOSCOPY      BRONCHOSCOPY N/A 06/12/2019    BRONCHOSCOPY ALVEOLAR LAVAGE performed by Ruiz Tiwari MD at [address]  2021    CATARACT REMOVAL      COLONOSCOPY      CORONARY ARTERY BYPASS GRAFT N/A 2021    CORONARY ARTERY BYPASS X3 WITH LEFT ATRIAL APPENDAGE CLIP, 5 LEVEL BILATERAL INTERCOSTAL NERVE BLOCK, STERNAL PLATING performed by Madi Abdalla MD at [address]      IR MIDLINE CATH  2021    IR MIDLINE CATH 2021 [id number] [facility] SPECIAL PROCEDURES    KNEE ARTHROSCOPY      left    KYPHOSIS SURGERY      LAMINECTOMY      LEG SURGERY Left 2021    INCISION AND DEBRIDEMENT LEFT LOWER LEG WOUND WITH POSSIBLE WOUND VAC PLACEMENT performed by Ward Hartman MD at [facility]  2020    MEDIASTINOSCOPY N/A 2021    MEDIASTINAL EXPLORATION AND EVACUATION OF HEMATOMA performed by Madi Abdalla MD at [facility]  2021    sacral wound debridement    PRESSURE ULCER DEBRIDEMENT N/A 2021    SACRAL WOUND DEBRIDEMENT performed by Oh Arshad MD at [facility] SEPTOPLASTY  2013    FESS with balloon    SPINAL FUSION      TRANSESOPHAGEAL ECHOCARDIOGRAM  2021    TUBAL LIGATION      UPPER GASTROINTESTINAL ENDOSCOPY  2014    Dilitation       FAMILY HISTORY    Family History   Problem Relation Age of Onset    Diabetes Mother     Hypertension Mother     Asthma Other     Heart Disease Brother     Diabetes Brother     Diabetes Sister     Heart Disease Brother     Cancer Neg Hx     Emphysema Neg Hx     Heart Failure Neg Hx        SOCIAL HISTORY    Social History     Tobacco Use    Smoking status: Former Smoker     Packs/day: 1.00     Years: 20.00     Pack years: 20.00     Types: Cigarettes     Quit date: 1991     Years since quittin.7    Smokeless tobacco: Never Used   Vaping Use    Vaping Use: Never used   Substance Use Topics    Alcohol use: Not Currently     Alcohol/week: 0.0 standard drinks     Comment: rarely    Drug use: No       ALLERGIES    Allergies   Allergen Reactions    Atenolol Other (See Comments)     Cough         MEDICATIONS    No current facility-administered medications on file prior to encounter. Current Outpatient Medications on File Prior to Encounter   Medication Sig Dispense Refill    dexamethasone (DECADRON) 6 MG tablet Take 1 tablet by mouth daily (with breakfast) for 7 days 7 tablet 0    etanercept (ENBREL) 50 MG/ML injection Inject 25 mg into the skin once Once weekly on Wednesday      torsemide (DEMADEX) 20 MG tablet Take 2 tablets by mouth daily If weight 131 lbs or less, decrease to 20 mg daily 60 tablet 5    metoprolol succinate (TOPROL XL) 25 MG extended release tablet Take 0.5 tablets by mouth daily 45 tablet 3    spironolactone (ALDACTONE) 25 MG tablet Take 1 tablet by mouth daily 90 tablet 3    midodrine (PROAMATINE) 2.5 MG tablet Take 1 tablet by mouth 3 times daily 270 tablet 3    DULoxetine (CYMBALTA) 60 MG extended release capsule Take 60 mg by mouth daily      DALIRESP 500 MCG tablet TAKE 1 TABLET BY MOUTH DAILY 30 tablet 11    clopidogrel (PLAVIX) 75 MG tablet Take 1 tablet by mouth daily 30 tablet 11    predniSONE (DELTASONE) 1 MG tablet Take 4 mg by mouth daily      insulin glargine (LANTUS) 100 UNIT/ML injection vial Inject 15 Units into the skin nightly 1 vial 0    oxyCODONE-acetaminophen (PERCOCET)  MG per tablet Take 1 tablet by mouth daily.  docusate sodium (COLACE) 100 MG capsule Take 100 mg by mouth 2 times daily as needed for Constipation      gabapentin (NEURONTIN) 300 MG capsule Take 300 mg by mouth 2 times daily.       latanoprost (XALATAN) 0.005 % ophthalmic solution Place 1 drop into both eyes nightly      Teriparatide, Recombinant, (FORTEO) 600 MCG/2.4ML SOPN injection Inject 20 mcg into the skin daily      NARCAN 4 MG/0.1ML LIQD nasal spray as needed       morphine (MS CONTIN) 15 MG extended release tablet 15 mg 2 times daily.  ipratropium (ATROVENT) 0.06 % nasal spray USE 2 SPRAYS BY NASAL ROUTE 2-4 TIMES DAILY 1 Bottle 5    albuterol sulfate  (90 Base) MCG/ACT inhaler INHALE 2 PUFFS INTO THE LUNGS EVERY 4 HOURS AS NEEDED FOR WHEEZING 1 Inhaler 5    vitamin D (CHOLECALCIFEROL) 1000 UNIT TABS tablet Take 5,000 Units by mouth daily       calcium carbonate (OSCAL) 500 MG TABS tablet Take 500 mg by mouth daily      atorvastatin (LIPITOR) 40 MG tablet Take 40 mg by mouth      cyclobenzaprine (FLEXERIL) 10 MG tablet Take 10 mg by mouth 2 times daily as needed       Insulin Syringe-Needle U-100 31G X 5/16\" 0.5 ML MISC USE 5 TIMES DAILY      ACCU-CHEK CEDRICK PLUS strip TEST 4 TIMES DAILY  3    aspirin EC 81 MG EC tablet Take 1 tablet by mouth daily      insulin lispro (HUMALOG) 100 UNIT/ML injection vial Inject 0-12 Units into the skin 3 times daily (with meals)      Misc. Devices (ACAPELLA) MISC Take 1 Device by mouth as needed 1 each 0    fluticasone (FLONASE) 50 MCG/ACT nasal spray INHALE 2 SPRAYS IN EACH NOSTRIL DAILY 1 Bottle 5    cetirizine (ZYRTEC) 10 MG tablet Take 10 mg by mouth daily.         omeprazole (PRILOSEC) 40 MG capsule Take 40 mg by mouth daily          Objective    BP (!) 94/45   Pulse 76   Temp 97.7 °F (36.5 °C) (Oral)   Resp 16   Ht 5' 8\" (1.727 m)   Wt 125 lb 9.6 oz (57 kg)   SpO2 96%   BMI 19.10 kg/m²     LABS:  WBC:    Lab Results   Component Value Date    WBC 5.7 12/21/2021     H/H:    Lab Results   Component Value Date    HGB 10.8 12/21/2021    HCT 31.9 12/21/2021     PTT:    Lab Results   Component Value Date    APTT 22.9 12/20/2021   [APTT}  PT/INR:    Lab Results   Component Value Date    PROTIME 10.6 12/20/2021    INR 0.93 12/20/2021     HgBA1c:    Lab Results   Component Value Date    LABA1C 7.7 12/20/2021       Assessment   Sven Risk Score: Sven Scale Score: 16    Patient Active Problem List   Diagnosis Code    Rheumatoid arthritis ([facility]) M06.9    Psoriasis L40.9    GERD (gastroesophageal reflux disease) K21.9    Anemia D64.9    Cylindrical bronchiectasis ([facility]) J47.9    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    Immunocompromised state ([address] D84.9    Coronary artery disease I25.10    Bilateral lower extremity edema R60.0    Essential hypertension I10    Lumbar spondylosis M47.816    Mitral valve insufficiency and aortic valve insufficiency I08.0    Mixed hyperlipidemia E78.2    Myopia of both eyes H52.13    Osteoporosis M81.0    Other chronic sinusitis J32.8    Primary open angle glaucoma (POAG) of both eyes, mild stage H40.1131    Primary osteoarthritis of right hip M16.11    Type 2 diabetes mellitus with unspecified diabetic retinopathy without macular edema ([facility]) E11.319    Type 2 diabetes mellitus with diabetic peripheral angiopathy without gangrene, with long-term current use of insulin ([facility]) E11.51, Z79.4    Pressure ulcer of coccygeal region, stage 4 ([facility]) L89.154    Hx of CABG Z95.1    Mild malnutrition ([facility]) E44.1    Acute on chronic diastolic CHF (congestive heart failure) ([facility]) I50.33    Chronic obstructive pulmonary disease ([facility]) J44.9    Nonrheumatic mitral (valve) insufficiency I34.0    Acute respiratory failure with hypoxia ([facility]) J96.01    COVID-19 U07.1    Acute on chronic respiratory failure with hypoxia ([facility]) J96.21    HCAP (healthcare-associated pneumonia) J18.9       Measurements:  Wound 12/18/20 #2, Sacrum, Pressure Injury, Stage 4, Onset 5/2020 (Active)   Wound Image   12/01/21 0925   Wound Etiology Pressure Stage  4 12/21/21 0805   Dressing Status New dressing applied;Clean;Dry; Intact 12/01/21 1020   Wound Cleansed Vashe 12/01/21 1020   Dressing/Treatment Other (comment) 12/01/21 1020   Wound Length (cm) 2.8 cm 12/01/21 0925   Wound Width (cm) 1 cm 12/01/21 0925   Wound Depth (cm) 1.1 cm 12/01/21 0925   Wound Surface Area (cm^2) 2.8 cm^2 12/01/21 0925   Change in Wound Size % (l*w) -1766.67 12/01/21 0925   Wound Volume (cm^3) 3.08 cm^3 12/01/21 0925   Wound Healing % -3322 12/01/21 0925   Post-Procedure Length (cm) 2.8 cm 12/01/21 0957   Post-Procedure Width (cm) 1 cm 12/01/21 0957   Post-Procedure Depth (cm) 1.1 cm 12/01/21 0957   Post-Procedure Surface Area (cm^2) 2.8 cm^2 12/01/21 0957   Post-Procedure Volume (cm^3) 3.08 cm^3 12/01/21 0957   Distance Tunneling (cm) 0 cm 12/01/21 0957   Undermining Starts ___ O'Clock 6 12/01/21 0957   Undermining Ends___ O'Clock 11 12/01/21 0957   Undermining Maxium Distance (cm) 2 12/01/21 0957   Wound Assessment Pink/red 12/01/21 0925   Drainage Amount Small 12/01/21 0925   Drainage Description Serous 12/01/21 0925   Odor None 12/01/21 0925   Blanca-wound Assessment Maceration 12/01/21 0925   Number of days: 368      Sacrum:  Chronic stage 4 pressure injury, present on admission. 100% pink and moist tissue . Slightly macerated edges. 3.5x3x1.1cm, undermining from 1-5 o'clock=2.1cm. No SOI, no odor    Right hip:  1.5x1.1x0.1cm, 100% pale pink, stage 32 pressur einjury, present on admission, edges dry, raised and flaky. Left heel:  1.5x3cm, calloused dark purple. black area. Pt has callous shaved off per podiatry , non pressure, chronic          Response to treatment:  Well tolerated by patient. Pain Assessment:  Severity:  0 / 10  Quality of pain: N/A  Wound Pain Timing/Severity: none  Premedicated: N/A    Plan  Sacrum and right hip:   Cleanse with  NSS, pat dry. Fill wounds with Alginate ag and cover with foam dressing, change every other day and prn. Left heel:  Foam dressing, change every 3 days and prn     Specialty Bed Required : No   [] Low Air Loss   [] Pressure Redistribution  [] Fluid Immersion  [] Bariatric  [] Total Pressure Relief  [] Other:     Current Diet: ADULT DIET;  Regular; 4 carb choices (60 gm/meal)  Dietician consult:  Yes    Discharge Plan:  Placement for patient upon discharge: home with support    Patient appropriate for Outpatient [address]: Yes-Follws in [address] [facility] wound center.     Referrals:  []   [] [facility]  [] Supplies  [] Other    Patient/Caregiver Teaching:  Level of patient/caregiver understanding able to:   [] Indicates understanding       [] Needs reinforcement  [] Unsuccessful      [] Verbal Understanding  [] Demonstrated understanding       [] No evidence of learning  [] Refused teaching         [] N/A       Electronically signed by Jacinto Melgar RN, Dayron Capps on 12/21/2021 at 5:24 PM

## 2021-12-21 NOTE — PROGRESS NOTES
RT Inhaler-Nebulizer Bronchodilator Protocol Note    There is a bronchodilator order in the chart from a provider indicating to follow the RT Bronchodilator Protocol and there is an Initiate RT Inhaler-Nebulizer Bronchodilator Protocol order as well (see protocol at bottom of note). CXR Findings:  XR CHEST PORTABLE    Result Date: 12/20/2021  Increased patchy opacities in the right lung which may reflect airspace disease superimposed on background emphysematous changes. The findings from the last RT Protocol Assessment were as follows:   History Pulmonary Disease: Chronic pulmonary disease  Respiratory Pattern: Regular pattern and RR 12-20 bpm  Breath Sounds: Clear breath sounds  Cough: Strong, spontaneous, non-productive  Indication for Bronchodilator Therapy: Decreased or absent breath sounds  Bronchodilator Assessment Score: 2    Aerosolized bronchodilator medication orders have been revised according to the RT Inhaler-Nebulizer Bronchodilator Protocol below. Respiratory Therapist to perform RT Therapy Protocol Assessment initially then follow the protocol. Repeat RT Therapy Protocol Assessment PRN for score 0-3 or on second treatment, BID, and PRN for scores above 3. No Indications - adjust the frequency to every 6 hours PRN wheezing or bronchospasm, if no treatments needed after 48 hours then discontinue using Per Protocol order mode. If indication present, adjust the RT bronchodilator orders based on the Bronchodilator Assessment Score as indicated below. Use Inhaler orders unless patient has one or more of the following: on home nebulizer, not able to hold breath for 10 seconds, is not alert and oriented, cannot activate and use MDI correctly, or respiratory rate 25 breaths per minute or more, then use the equivalent nebulizer order(s) with same Frequency and PRN reasons based on the score.   If a patient is on this medication at home then do not decrease Frequency below that used at home.    0-3 - enter or revise RT bronchodilator order(s) to equivalent RT Bronchodilator order with Frequency of every 4 hours PRN for wheezing or increased work of breathing using Per Protocol order mode. 4-6 - enter or revise RT Bronchodilator order(s) to two equivalent RT bronchodilator orders with one order with BID Frequency and one order with Frequency of every 4 hours PRN wheezing or increased work of breathing using Per Protocol order mode. 7-10 - enter or revise RT Bronchodilator order(s) to two equivalent RT bronchodilator orders with one order with TID Frequency and one order with Frequency of every 4 hours PRN wheezing or increased work of breathing using Per Protocol order mode. 11-13 - enter or revise RT Bronchodilator order(s) to one equivalent RT bronchodilator order with QID Frequency and an Albuterol order with Frequency of every 4 hours PRN wheezing or increased work of breathing using Per Protocol order mode. Greater than 13 - enter or revise RT Bronchodilator order(s) to one equivalent RT bronchodilator order with every 4 hours Frequency and an Albuterol order with Frequency of every 2 hours PRN wheezing or increased work of breathing using Per Protocol order mode.        Electronically signed by America Abbott RCP on 12/21/2021 at 3:26 AM

## 2021-12-21 NOTE — PROGRESS NOTES
Vancomycin Day: 2    Patient's labs, cultures, vitals, and vancomycin regimen reviewed. No changes today.     Level on 12/23  Jose Bernard Pharm D 12/21/202111:18 AM  .

## 2021-12-22 LAB
GLUCOSE BLD-MCNC: 188 MG/DL (ref 70–99)
GLUCOSE BLD-MCNC: 238 MG/DL (ref 70–99)
GLUCOSE BLD-MCNC: 280 MG/DL (ref 70–99)
GLUCOSE BLD-MCNC: 330 MG/DL (ref 70–99)
GLUCOSE BLD-MCNC: 334 MG/DL (ref 70–99)
PERFORMED ON: ABNORMAL

## 2021-12-22 PROCEDURE — 99232 SBSQ HOSP IP/OBS MODERATE 35: CPT | Performed by: INTERNAL MEDICINE

## 2021-12-22 PROCEDURE — 2580000003 HC RX 258: Performed by: NURSE PRACTITIONER

## 2021-12-22 PROCEDURE — 87186 SC STD MICRODIL/AGAR DIL: CPT

## 2021-12-22 PROCEDURE — 87070 CULTURE OTHR SPECIMN AEROBIC: CPT

## 2021-12-22 PROCEDURE — 6360000002 HC RX W HCPCS: Performed by: NURSE PRACTITIONER

## 2021-12-22 PROCEDURE — 2700000000 HC OXYGEN THERAPY PER DAY

## 2021-12-22 PROCEDURE — 87205 SMEAR GRAM STAIN: CPT

## 2021-12-22 PROCEDURE — 2060000000 HC ICU INTERMEDIATE R&B

## 2021-12-22 PROCEDURE — 6370000000 HC RX 637 (ALT 250 FOR IP): Performed by: NURSE PRACTITIONER

## 2021-12-22 PROCEDURE — 94761 N-INVAS EAR/PLS OXIMETRY MLT: CPT

## 2021-12-22 PROCEDURE — 99233 SBSQ HOSP IP/OBS HIGH 50: CPT | Performed by: INTERNAL MEDICINE

## 2021-12-22 RX ORDER — IPRATROPIUM BROMIDE AND ALBUTEROL SULFATE 2.5; .5 MG/3ML; MG/3ML
1 SOLUTION RESPIRATORY (INHALATION) EVERY 4 HOURS PRN
Status: DISCONTINUED | OUTPATIENT
Start: 2021-12-22 | End: 2021-12-28 | Stop reason: HOSPADM

## 2021-12-22 RX ORDER — DEXAMETHASONE SODIUM PHOSPHATE 4 MG/ML
3 INJECTION, SOLUTION INTRA-ARTICULAR; INTRALESIONAL; INTRAMUSCULAR; INTRAVENOUS; SOFT TISSUE DAILY
Status: DISCONTINUED | OUTPATIENT
Start: 2021-12-23 | End: 2021-12-23

## 2021-12-22 RX ADMIN — MORPHINE SULFATE 15 MG: 15 TABLET, FILM COATED, EXTENDED RELEASE ORAL at 21:38

## 2021-12-22 RX ADMIN — MORPHINE SULFATE 15 MG: 15 TABLET, FILM COATED, EXTENDED RELEASE ORAL at 08:57

## 2021-12-22 RX ADMIN — SODIUM CHLORIDE 25 ML: 9 INJECTION, SOLUTION INTRAVENOUS at 03:49

## 2021-12-22 RX ADMIN — GABAPENTIN 300 MG: 300 CAPSULE ORAL at 21:38

## 2021-12-22 RX ADMIN — INSULIN LISPRO 4 UNITS: 100 INJECTION, SOLUTION INTRAVENOUS; SUBCUTANEOUS at 09:16

## 2021-12-22 RX ADMIN — INSULIN LISPRO 4 UNITS: 100 INJECTION, SOLUTION INTRAVENOUS; SUBCUTANEOUS at 21:40

## 2021-12-22 RX ADMIN — CETIRIZINE HYDROCHLORIDE 10 MG: 10 TABLET ORAL at 08:56

## 2021-12-22 RX ADMIN — METOPROLOL SUCCINATE 12.5 MG: 25 TABLET, EXTENDED RELEASE ORAL at 08:56

## 2021-12-22 RX ADMIN — ATORVASTATIN CALCIUM 40 MG: 40 TABLET, FILM COATED ORAL at 21:39

## 2021-12-22 RX ADMIN — SODIUM CHLORIDE, PRESERVATIVE FREE 10 ML: 5 INJECTION INTRAVENOUS at 08:57

## 2021-12-22 RX ADMIN — SPIRONOLACTONE 25 MG: 25 TABLET ORAL at 08:57

## 2021-12-22 RX ADMIN — ROFLUMILAST 500 MCG: 500 TABLET ORAL at 08:57

## 2021-12-22 RX ADMIN — ENOXAPARIN SODIUM 30 MG: 100 INJECTION SUBCUTANEOUS at 21:38

## 2021-12-22 RX ADMIN — ENOXAPARIN SODIUM 30 MG: 100 INJECTION SUBCUTANEOUS at 08:56

## 2021-12-22 RX ADMIN — VANCOMYCIN HYDROCHLORIDE 1000 MG: 1 INJECTION, POWDER, LYOPHILIZED, FOR SOLUTION INTRAVENOUS at 17:19

## 2021-12-22 RX ADMIN — CEFEPIME 2000 MG: 2 INJECTION, POWDER, FOR SOLUTION INTRAVENOUS at 14:52

## 2021-12-22 RX ADMIN — INSULIN LISPRO 2 UNITS: 100 INJECTION, SOLUTION INTRAVENOUS; SUBCUTANEOUS at 17:19

## 2021-12-22 RX ADMIN — GABAPENTIN 300 MG: 300 CAPSULE ORAL at 08:57

## 2021-12-22 RX ADMIN — IPRATROPIUM BROMIDE 1 SPRAY: 42 SPRAY NASAL at 21:40

## 2021-12-22 RX ADMIN — MIDODRINE HYDROCHLORIDE 2.5 MG: 5 TABLET ORAL at 14:51

## 2021-12-22 RX ADMIN — MIDODRINE HYDROCHLORIDE 2.5 MG: 5 TABLET ORAL at 08:56

## 2021-12-22 RX ADMIN — DEXAMETHASONE SODIUM PHOSPHATE 6 MG: 10 INJECTION, SOLUTION INTRAMUSCULAR; INTRAVENOUS at 08:56

## 2021-12-22 RX ADMIN — IPRATROPIUM BROMIDE 1 SPRAY: 42 SPRAY NASAL at 08:58

## 2021-12-22 RX ADMIN — CALCIUM CARBONATE (ANTACID) CHEW TAB 500 MG 500 MG: 500 CHEW TAB at 08:57

## 2021-12-22 RX ADMIN — SODIUM CHLORIDE, PRESERVATIVE FREE 10 ML: 5 INJECTION INTRAVENOUS at 21:39

## 2021-12-22 RX ADMIN — PANTOPRAZOLE SODIUM 40 MG: 40 TABLET, DELAYED RELEASE ORAL at 05:05

## 2021-12-22 RX ADMIN — TORSEMIDE 20 MG: 20 TABLET ORAL at 08:57

## 2021-12-22 RX ADMIN — DULOXETINE HYDROCHLORIDE 60 MG: 60 CAPSULE, DELAYED RELEASE ORAL at 08:56

## 2021-12-22 RX ADMIN — CLOPIDOGREL BISULFATE 75 MG: 75 TABLET ORAL at 08:57

## 2021-12-22 RX ADMIN — ASPIRIN 81 MG: 81 TABLET, COATED ORAL at 08:57

## 2021-12-22 RX ADMIN — INSULIN LISPRO 6 UNITS: 100 INJECTION, SOLUTION INTRAVENOUS; SUBCUTANEOUS at 12:32

## 2021-12-22 RX ADMIN — MIDODRINE HYDROCHLORIDE 2.5 MG: 5 TABLET ORAL at 21:39

## 2021-12-22 RX ADMIN — INSULIN GLARGINE 15 UNITS: 100 INJECTION, SOLUTION SUBCUTANEOUS at 21:41

## 2021-12-22 RX ADMIN — FLUTICASONE PROPIONATE 1 SPRAY: 50 SPRAY, METERED NASAL at 08:58

## 2021-12-22 RX ADMIN — LATANOPROST 1 DROP: 50 SOLUTION OPHTHALMIC at 21:40

## 2021-12-22 RX ADMIN — CEFEPIME 2000 MG: 2 INJECTION, POWDER, FOR SOLUTION INTRAVENOUS at 03:51

## 2021-12-22 ASSESSMENT — PAIN SCALES - GENERAL
PAINLEVEL_OUTOF10: 7
PAINLEVEL_OUTOF10: 7

## 2021-12-22 ASSESSMENT — PAIN DESCRIPTION - LOCATION: LOCATION: BACK

## 2021-12-22 ASSESSMENT — PAIN DESCRIPTION - FREQUENCY: FREQUENCY: CONTINUOUS

## 2021-12-22 ASSESSMENT — PAIN - FUNCTIONAL ASSESSMENT: PAIN_FUNCTIONAL_ASSESSMENT: ACTIVITIES ARE NOT PREVENTED

## 2021-12-22 ASSESSMENT — PAIN DESCRIPTION - ONSET: ONSET: ON-GOING

## 2021-12-22 ASSESSMENT — PAIN DESCRIPTION - ORIENTATION: ORIENTATION: LOWER

## 2021-12-22 ASSESSMENT — PAIN DESCRIPTION - DESCRIPTORS: DESCRIPTORS: ACHING;CONSTANT

## 2021-12-22 ASSESSMENT — PAIN DESCRIPTION - PAIN TYPE: TYPE: CHRONIC PAIN

## 2021-12-22 NOTE — FLOWSHEET NOTE
12/22/21 0852   Vital Signs   Temp 97 °F (36.1 °C)   Temp Source Oral   Pulse 80   Resp 16   BP (!) 101/48   BP Location Left upper arm   Level of Consciousness Alert (0)   MEWS Score 1   Oxygen Therapy   SpO2 90 %   O2 Device Nasal cannula   O2 Flow Rate (L/min) 2 L/min     Patient resting quietly in bed. No s/s of distress noted. Shift assessment complete, see flow sheet. Assisted up to [facility], very SOB when up. Assisted back to bed. Call light in reach. Will monitor.

## 2021-12-22 NOTE — PLAN OF CARE
Problem: Airway Clearance - Ineffective  Goal: Achieve or maintain patent airway  12/22/2021 0113 by Austin Hernandez RN  Outcome: Ongoing  12/21/2021 2028 by Maryann Rea RN  Outcome: Ongoing     Problem: Gas Exchange - Impaired  Goal: Absence of hypoxia  12/22/2021 0113 by Austin Hernandez RN  Outcome: Ongoing  12/21/2021 2028 by Maryann Rea RN  Outcome: Ongoing  Goal: Promote optimal lung function  12/22/2021 0113 by Austin Hernandez RN  Outcome: Ongoing  12/21/2021 2028 by Maryann Rea RN  Outcome: Ongoing     Problem: Breathing Pattern - Ineffective  Goal: Ability to achieve and maintain a regular respiratory rate  12/22/2021 0113 by Austin Hernandez RN  Outcome: Ongoing  12/21/2021 2028 by Maryann Rea RN  Outcome: Ongoing     Problem:  Body Temperature -  Risk of, Imbalanced  Goal: Ability to maintain a body temperature within defined limits  12/22/2021 0113 by Austin Hernandez RN  Outcome: Ongoing  12/21/2021 2028 by Maryann Rea RN  Outcome: Ongoing  Goal: Will regain or maintain usual level of consciousness  12/22/2021 0113 by Austin Hernandez RN  Outcome: Ongoing  12/21/2021 2028 by Maryann Rea RN  Outcome: Ongoing  Goal: Complications related to the disease process, condition or treatment will be avoided or minimized  12/22/2021 0113 by Austin Hernandez RN  Outcome: Ongoing  12/21/2021 2028 by Maryann Rea RN  Outcome: Ongoing     Problem: Isolation Precautions - Risk of Spread of Infection  Goal: Prevent transmission of infection  12/22/2021 0113 by Austin Hernandez RN  Outcome: Ongoing  12/21/2021 2028 by Maryann Rea RN  Outcome: Ongoing     Problem: Nutrition Deficits  Goal: Optimize nutritional status  12/22/2021 0113 by Austin Hernandez RN  Outcome: Ongoing  12/21/2021 2028 by Maryann Rea RN  Outcome: Ongoing     Problem: Risk for Fluid Volume Deficit  Goal: Maintain normal heart rhythm  12/22/2021 0113 by Austin Hernandez RN  Outcome: Ongoing  12/21/2021 2028 by Francisco Crook RN  Outcome: Ongoing  Goal: Maintain absence of muscle cramping  12/22/2021 0113 by Adelita Warren RN  Outcome: Ongoing  12/21/2021 2028 by Francisco Crook RN  Outcome: Ongoing  Goal: Maintain normal serum potassium, sodium, calcium, phosphorus, and pH  12/22/2021 0113 by Adelita Warren RN  Outcome: Ongoing  12/21/2021 2028 by Francisco Crook RN  Outcome: Ongoing     Problem: Loneliness or Risk for Loneliness  Goal: Demonstrate positive use of time alone when socialization is not possible  12/22/2021 0113 by Adelita Warren RN  Outcome: Ongoing  12/21/2021 2028 by Francisco Crook RN  Outcome: Ongoing     Problem: Fatigue  Goal: Verbalize increase energy and improved vitality  12/22/2021 0113 by Adelita Warren RN  Outcome: Ongoing  12/21/2021 2028 by Francisco Crook RN  Outcome: Ongoing     Problem: Patient Education: Go to Patient Education Activity  Goal: Patient/Family Education  12/22/2021 0113 by Adelita Warren RN  Outcome: Ongoing  12/21/2021 2028 by Francisco Crook RN  Outcome: Ongoing     Problem: Falls - Risk of:  Goal: Will remain free from falls  Description: Will remain free from falls  12/22/2021 0113 by Adelita Warren RN  Outcome: Ongoing  12/21/2021 2028 by Francisco Crook RN  Outcome: Ongoing  Goal: Absence of physical injury  Description: Absence of physical injury  12/22/2021 0113 by Adelita Warren RN  Outcome: Ongoing  12/21/2021 2028 by Francisco Croko RN  Outcome: Ongoing     Problem: Skin Integrity:  Goal: Will show no infection signs and symptoms  Description: Will show no infection signs and symptoms  12/22/2021 0113 by Adelita Warren RN  Outcome: Ongoing  12/21/2021 2028 by Francisco Crook RN  Outcome: Ongoing  Goal: Absence of new skin breakdown  Description: Absence of new skin breakdown  12/22/2021 0113 by Adelita Warren RN  Outcome: Ongoing  12/21/2021 2028 by Francisco Crook RN  Outcome: Ongoing     Problem: Pain:  Goal: Pain level will decrease  Description: Pain level will decrease  12/22/2021 0113 by Kelle Carreon RN  Outcome: Ongoing  12/21/2021 2028 by Sandra Chauhan RN  Outcome: Ongoing  Goal: Control of acute pain  Description: Control of acute pain  12/22/2021 0113 by Kelle Carreon RN  Outcome: Ongoing  12/21/2021 2028 by Sandra Chauhan RN  Outcome: Ongoing  Goal: Control of chronic pain  Description: Control of chronic pain  12/22/2021 0113 by Kelle Carreon RN  Outcome: Ongoing  12/21/2021 2028 by Sandra Chauhan RN  Outcome: Ongoing

## 2021-12-22 NOTE — PROGRESS NOTES
Pt awake in bed. Assessment completed and medications given. VS stable. Pt denies any further needs at this time. Call light in reach and bed in lowest position. Will continue to monitor.

## 2021-12-22 NOTE — PROGRESS NOTES
Pulmonary Progress Note  CC: COVID-19 and respiratory failure in a vaccinated pt. Subjective:  Feels better today. No sputum. No fever. EXAM: BP (!) 101/48   Pulse 80   Temp 97 °F (36.1 °C) (Oral)   Resp 16   Ht 5' 8\" (1.727 m)   Wt 127 lb (57.6 kg)   SpO2 90%   BMI 19.31 kg/m²  on 2 L  Constitutional:  No acute distress. Eyes: PERRL. Conjunctivae anicteric. ENT: Normal nose. Normal tongue. Neck:  Trachea is midline. No thyroid tenderness. Respiratory: No accessory muscle usage. decreased breath sounds. No wheezes. No rales. No Rhonchi. Cardiovascular: Normal S1S2. No digit clubbing. No digit cyanosis. No LE edema. Psychiatric: No anxiety or Agitation. Alert and Oriented to person, place and time.     Scheduled Meds:   cefepime  2,000 mg IntraVENous Q12H    aspirin EC  81 mg Oral Daily    atorvastatin  40 mg Oral Nightly    calcium carbonate  500 mg Oral Daily    cetirizine  10 mg Oral Daily    clopidogrel  75 mg Oral Daily    Roflumilast  500 mcg Oral Daily    DULoxetine  60 mg Oral Daily    fluticasone  1 spray Each Nostril Daily    gabapentin  300 mg Oral BID    insulin glargine  15 Units SubCUTAneous Nightly    ipratropium  1 spray Each Nostril BID    latanoprost  1 drop Both Eyes Nightly    metoprolol succinate  12.5 mg Oral Daily    midodrine  2.5 mg Oral TID    morphine  15 mg Oral BID    pantoprazole  40 mg Oral QAM AC    spironolactone  25 mg Oral Daily    torsemide  20 mg Oral Daily    sodium chloride flush  5-40 mL IntraVENous 2 times per day    enoxaparin  30 mg SubCUTAneous BID    insulin lispro  0-12 Units SubCUTAneous TID WC    insulin lispro  0-6 Units SubCUTAneous Nightly    dexamethasone  6 mg IntraVENous Daily    vancomycin  1,000 mg IntraVENous Q24H     Continuous Infusions:   dextrose      sodium chloride 25 mL (12/22/21 7509)     PRN Meds:  albuterol sulfate HFA, cyclobenzaprine, glucose, dextrose, glucagon (rDNA), dextrose, sodium chloride flush, sodium chloride, ondansetron **OR** ondansetron, polyethylene glycol, acetaminophen **OR** acetaminophen    Labs:  CBC:   Recent Labs     12/20/21  1304 12/21/21  0444   WBC 5.8 5.7   HGB 11.8* 10.8*   HCT 35.9* 31.9*   MCV 93.5 89.8    229     BMP:   Recent Labs     12/20/21  1255 12/21/21  0444   * 133*   K 3.0* 4.1   CL 86* 91*   CO2 32 32   BUN 24* 21*   CREATININE 1.0 0.8     Micro:  12/13/2021 SARS-CoV-2 positive  12/20/2021 Procal 1.33     Imaging: Chest imaging was reviewed by me and showed   CTPA 12/13/2021  1. No evidence of pulmonary embolism or acute pulmonary abnormality. 2. Scarring at the right lung base     CXR 12/20/2021   Increased patchy opacities in the right lung which may reflect airspace   disease superimposed on background emphysematous changes.      ASSESSMENT:  · Acute hypoxic respiratory failure  · HCAP  · COVID-19 pneumonia in a fully vaccinated individual, 8 days since sx onset  · Lactic acidosis  · COPD with bronchiectasis   · Tracheomalacia    · RA on Enbrel, MTX, & Prednisone  · CHF  · CAD s/p CABG 5/3/21  · DANIELLE, mild - on CPAP  · Former smoker, 20 pack-yr hx, quit 1991     PLAN:  · COVID-19 isolation, droplet plus  · Supplemental O2 to maintain SaO2 >92%; monitor sats closely   · Decadron D#10, 6 mg daily -change to 3mg tomorrow  ? No baricitinib or tocilizumab with untreated severe infection   · Vancomycin/Cefepime D#3 for HCAP, monitoring of vancomycin levels to prevent toxicity. · Is on daily Azithromycin. Daliresp qd.    · Inhaled bronchodilators only as needed, MDI preferred   · resp cx  · Prophylaxis: Lovenox  · Consider d/c planning for tomorrow

## 2021-12-22 NOTE — FLOWSHEET NOTE
12/22/21 0330   Vital Signs   Temp 97.9 °F (36.6 °C)   Temp Source Oral   Pulse 87   Heart Rate Source Monitor   Resp 16   /77   BP Location Left upper arm   Level of Consciousness Alert (0)   MEWS Score 1     Pt awake in bed. VS as shown above. Pt denies any further needs at this time. call light in reach and bed in lowest position. Will continue to monitor.

## 2021-12-22 NOTE — CARE COORDINATION
INTERDISCIPLINARY PLAN OF CARE CONFERENCE    Date/Time: 12/22/2021 2:53 PM  Completed by: Jaxson Martin RN, Case Management      Patient Name:  Miya Galvez  YOB: 1950  Admitting Diagnosis: Hypokalemia [E87.6]  Acute respiratory failure with hypoxia ([address] [J96.01]  Acute on chronic respiratory failure with hypoxia ([address] [J96.21]  Pneumonia due to infectious organism, unspecified laterality, unspecified part of lung [J18.9]  COVID [U07.1]     Admit Date/Time:  12/20/2021 12:44 PM    Chart reviewed. Interdisciplinary team contacted or reviewed plan related to patient progress and discharge plans. Disciplines included Case Management, Nursing, and Dietitian. Current Status:ongoing  PT/OT recommendation for discharge plan of care: n/a    Expected D/C Disposition:  Home  Confirmed plan with patient and/or family Yes confirmed with: (name) pt  Met with:pt  Discharge Plan Comments: Reviewed chart. Role of discharge planner explained and patient verbalized understanding. Pt is a readmission. Pt is from daughter's home, as pt was discharged last week to daughter's home with [address] and [employer] home O2 (2L baseline)    Pt plans to have JONES with [employer]  (SN/PT/OT). Pt remains at 2L (at 94%). Pt is covid pos as of 12/13/2021. Home O2 in place on admit: Yes  Pt informed of need to bring portable home O2 tank on day of discharge for nursing to connect prior to leaving:  Yes  Verbalized agreement/Understanding:   Yes

## 2021-12-22 NOTE — PROGRESS NOTES
Vancomycin Day: 3    Patient's labs, cultures, vitals, and vancomycin regimen reviewed. No changes today.   BMP daily  Level tomorrow  Marga PERRY 12/22/20212:41 PM  .

## 2021-12-22 NOTE — PROGRESS NOTES
IM Progress Note    Admit Date:  12/20/2021    COVID-19 patient, admitted for acute on chronic hypoxic respiratory failure. She was just discharged readmitted with increased shortness of breath and hypoxia. has  HCAP    Subjective:  Ms. Jeneen Schilder is feeling much better today. Decreased cough and shortness of breath. O2 sats remained stable on 2 L.   still coughing up some sputum . she is afebrile. Objective:   BP (!) 119/59   Pulse 82   Temp 97.2 °F (36.2 °C) (Oral)   Resp 16   Ht 5' 8\" (1.727 m)   Wt 127 lb (57.6 kg)   SpO2 94%   BMI 19.31 kg/m²     Intake/Output Summary (Last 24 hours) at 12/22/2021 1504  Last data filed at 12/22/2021 1449  Gross per 24 hour   Intake 592 ml   Output 2650 ml   Net -2058 ml         Physical Exam:Patient in droplet plus precautions  Gen: No distress. Alert. Awake and well-oriented. Appears fatigued and ill  Eyes: PERRL. No sclera icterus. No conjunctival injection. ENT: No discharge. Pharynx clear. Neck: No JVD. No Carotid Bruit. Trachea midline. Resp: No accessory muscle use. Diminished breath sounds . No wheezes rales or rhonchi today  CV: Regular rate. Regular rhythm. No murmur. No rub. No edema. Capillary Refill: Brisk,< 3 seconds   Peripheral Pulses: +2 palpable, equal bilaterally   GI: Non-tender. Non-distended. No masses. No organomegaly. Normal bowel sounds. No hernia. Skin: Warm and dry. No nodule on exposed extremities. No rash on exposed extremities. M/S: No cyanosis. No joint deformity. No clubbing. Neuro: Awake. Grossly nonfocal    Psych: Oriented x 3. No anxiety or agitation.         Medications:   Scheduled Meds:   [START ON 12/23/2021] dexamethasone  3 mg IntraVENous Daily    cefepime  2,000 mg IntraVENous Q12H    aspirin EC  81 mg Oral Daily    atorvastatin  40 mg Oral Nightly    calcium carbonate  500 mg Oral Daily    cetirizine  10 mg Oral Daily    clopidogrel  75 mg Oral Daily    Roflumilast  500 mcg Oral Daily    DULoxetine 60 mg Oral Daily    fluticasone  1 spray Each Nostril Daily    gabapentin  300 mg Oral BID    insulin glargine  15 Units SubCUTAneous Nightly    ipratropium  1 spray Each Nostril BID    latanoprost  1 drop Both Eyes Nightly    metoprolol succinate  12.5 mg Oral Daily    midodrine  2.5 mg Oral TID    morphine  15 mg Oral BID    pantoprazole  40 mg Oral QAM AC    spironolactone  25 mg Oral Daily    torsemide  20 mg Oral Daily    sodium chloride flush  5-40 mL IntraVENous 2 times per day    enoxaparin  30 mg SubCUTAneous BID    insulin lispro  0-12 Units SubCUTAneous TID WC    insulin lispro  0-6 Units SubCUTAneous Nightly    vancomycin  1,000 mg IntraVENous Q24H       Continuous Infusions:   dextrose      sodium chloride 25 mL (12/22/21 4869)       Data:  CBC:   Recent Labs     12/20/21  1304 12/21/21  0444   WBC 5.8 5.7   RBC 3.84* 3.55*   HGB 11.8* 10.8*   HCT 35.9* 31.9*   MCV 93.5 89.8   RDW 14.4 14.2    229     BMP:   Recent Labs     12/20/21  1255 12/21/21  0444   * 133*   K 3.0* 4.1   CL 86* 91*   CO2 32 32   BUN 24* 21*   CREATININE 1.0 0.8     BNP: No results for input(s): BNP in the last 72 hours.   PT/INR:   Recent Labs     12/20/21  1255   PROTIME 10.6   INR 0.93     APTT:   Recent Labs     12/20/21  1255   APTT 22.9*     CARDIAC ENZYMES:   Recent Labs     12/20/21  1255   TROPONINI 0.02*     FASTING LIPID PANEL:  Lab Results   Component Value Date    CHOL 112 10/29/2021    HDL 34 (L) 10/29/2021    TRIG 85 10/29/2021     LIVER PROFILE:   Recent Labs     12/20/21  1255   AST 9*   ALT 10   BILITOT 0.8   ALKPHOS 115        CULTURES  Blood: pending   Sputum : pending     EKG:   Sinus rhythm with occasional Premature ventricular complexes  Possible Left atrial enlargement  Non-specific intra-ventricular conduction delay  Nonspecific ST and T wave abnormality  Abnormal ECG  When compared with ECG of 13-DEC-2021 14:26,  No significant change was found  Confirmed by Adrienne Collins MD, [facility]     RADIOLOGY  XR CHEST PORTABLE   Final Result   Increased patchy opacities in the right lung which may reflect airspace   disease superimposed on background emphysematous changes. Assessment:  Active Problems:    Acute on chronic respiratory failure with hypoxia (HCC)    COVID    Pneumonia due to infectious organism    Hypokalemia  Resolved Problems:    * No resolved hospital problems. *      Plan:     #Acute hypoxic respiratory failure  - 2/2 PNA, COPD, and COVID 19  - patient dc'd after recent admit on 2L  -hypoxic on presentation , with O2 requirement up to 6 L .  she is currently requiring 2L. Continue to monitor O2 sats  She has improved. O2 sats remained stable on 2 L     #Pneumonia   - Health care acquired: suspect a gram negative organism, also risk for MRSA  -Procalcitonin elevated 1.3  -Continue to treat with antibiotics vanc and cefepime , day #3  - check cultures - negative so far      #COPD with AE  - combivent   -On steroids  -Duo nebs as needed     #COVID 19  - in a vaccinated patient.  She completed 2 dose vaccination with Pfizer in March 2021, has not received a booster dose.  She is immunocompromised on Enbrel and daily prednisone for RA  - symptoms of dyspnea present now for 8 days before this admission  - treated with decadron and remdesivir during most recent admit   - decadron resumed. She is on Decadron day #10.   Decrease the dose in a.m.  -Submental oxygen as needed  - appreciate pulmonary input   - droplet + isolation      #chronic diastolic CHF  - Home diuretics: Demadex 40 mg daily, spironolactone 25 mg daily- continue      #Hypokalemia   -Repleted     #Debility  -PT OT     #CAD post CABG and stents   #PVD sp right femoral popliteal bypass surgery   - cont ASA, plavix, statin, toprol      #Mild Mitral Regurgitation   - f/b cardio     #Rheumatoid arthritis   - on Enbrel and daily prednisone at home- held      #Stage 4 sacral pressure ulcer  - f/b Dr bAdullahi Hernandez in the

## 2021-12-23 LAB
ANION GAP SERPL CALCULATED.3IONS-SCNC: 11 MMOL/L (ref 3–16)
BUN BLDV-MCNC: 25 MG/DL (ref 7–20)
CALCIUM SERPL-MCNC: 8.5 MG/DL (ref 8.3–10.6)
CHLORIDE BLD-SCNC: 87 MMOL/L (ref 99–110)
CO2: 32 MMOL/L (ref 21–32)
CREAT SERPL-MCNC: 0.9 MG/DL (ref 0.6–1.2)
GFR AFRICAN AMERICAN: >60
GFR NON-AFRICAN AMERICAN: >60
GLUCOSE BLD-MCNC: 203 MG/DL (ref 70–99)
GLUCOSE BLD-MCNC: 258 MG/DL (ref 70–99)
GLUCOSE BLD-MCNC: 287 MG/DL (ref 70–99)
GLUCOSE BLD-MCNC: 298 MG/DL (ref 70–99)
GLUCOSE BLD-MCNC: 353 MG/DL (ref 70–99)
GLUCOSE BLD-MCNC: 366 MG/DL (ref 70–99)
PERFORMED ON: ABNORMAL
POTASSIUM SERPL-SCNC: 3.6 MMOL/L (ref 3.5–5.1)
SODIUM BLD-SCNC: 130 MMOL/L (ref 136–145)
VANCOMYCIN TROUGH: 28.5 UG/ML (ref 10–20)

## 2021-12-23 PROCEDURE — 99233 SBSQ HOSP IP/OBS HIGH 50: CPT | Performed by: INTERNAL MEDICINE

## 2021-12-23 PROCEDURE — 80202 ASSAY OF VANCOMYCIN: CPT

## 2021-12-23 PROCEDURE — 36415 COLL VENOUS BLD VENIPUNCTURE: CPT

## 2021-12-23 PROCEDURE — 2060000000 HC ICU INTERMEDIATE R&B

## 2021-12-23 PROCEDURE — 94640 AIRWAY INHALATION TREATMENT: CPT

## 2021-12-23 PROCEDURE — 99232 SBSQ HOSP IP/OBS MODERATE 35: CPT | Performed by: INTERNAL MEDICINE

## 2021-12-23 PROCEDURE — 80048 BASIC METABOLIC PNL TOTAL CA: CPT

## 2021-12-23 PROCEDURE — 2580000003 HC RX 258: Performed by: NURSE PRACTITIONER

## 2021-12-23 PROCEDURE — 6370000000 HC RX 637 (ALT 250 FOR IP): Performed by: NURSE PRACTITIONER

## 2021-12-23 PROCEDURE — 2700000000 HC OXYGEN THERAPY PER DAY

## 2021-12-23 PROCEDURE — 94761 N-INVAS EAR/PLS OXIMETRY MLT: CPT

## 2021-12-23 PROCEDURE — 6370000000 HC RX 637 (ALT 250 FOR IP): Performed by: INTERNAL MEDICINE

## 2021-12-23 PROCEDURE — 6360000002 HC RX W HCPCS: Performed by: NURSE PRACTITIONER

## 2021-12-23 RX ORDER — PREDNISONE 20 MG/1
40 TABLET ORAL DAILY
Status: DISCONTINUED | OUTPATIENT
Start: 2021-12-23 | End: 2021-12-28 | Stop reason: HOSPADM

## 2021-12-23 RX ADMIN — ENOXAPARIN SODIUM 30 MG: 100 INJECTION SUBCUTANEOUS at 21:25

## 2021-12-23 RX ADMIN — INSULIN GLARGINE 15 UNITS: 100 INJECTION, SOLUTION SUBCUTANEOUS at 21:25

## 2021-12-23 RX ADMIN — ROFLUMILAST 500 MCG: 500 TABLET ORAL at 09:21

## 2021-12-23 RX ADMIN — CLOPIDOGREL BISULFATE 75 MG: 75 TABLET ORAL at 09:21

## 2021-12-23 RX ADMIN — TORSEMIDE 20 MG: 20 TABLET ORAL at 09:21

## 2021-12-23 RX ADMIN — INSULIN LISPRO 4 UNITS: 100 INJECTION, SOLUTION INTRAVENOUS; SUBCUTANEOUS at 17:15

## 2021-12-23 RX ADMIN — ASPIRIN 81 MG: 81 TABLET, COATED ORAL at 09:20

## 2021-12-23 RX ADMIN — ENOXAPARIN SODIUM 30 MG: 100 INJECTION SUBCUTANEOUS at 09:20

## 2021-12-23 RX ADMIN — FLUTICASONE PROPIONATE 1 SPRAY: 50 SPRAY, METERED NASAL at 09:22

## 2021-12-23 RX ADMIN — CEFEPIME 2000 MG: 2 INJECTION, POWDER, FOR SOLUTION INTRAVENOUS at 15:08

## 2021-12-23 RX ADMIN — CETIRIZINE HYDROCHLORIDE 10 MG: 10 TABLET ORAL at 09:21

## 2021-12-23 RX ADMIN — CALCIUM CARBONATE (ANTACID) CHEW TAB 500 MG 500 MG: 500 CHEW TAB at 09:21

## 2021-12-23 RX ADMIN — MORPHINE SULFATE 15 MG: 15 TABLET, FILM COATED, EXTENDED RELEASE ORAL at 09:20

## 2021-12-23 RX ADMIN — METOPROLOL SUCCINATE 12.5 MG: 25 TABLET, EXTENDED RELEASE ORAL at 09:20

## 2021-12-23 RX ADMIN — INSULIN LISPRO 10 UNITS: 100 INJECTION, SOLUTION INTRAVENOUS; SUBCUTANEOUS at 07:55

## 2021-12-23 RX ADMIN — INSULIN LISPRO 5 UNITS: 100 INJECTION, SOLUTION INTRAVENOUS; SUBCUTANEOUS at 21:25

## 2021-12-23 RX ADMIN — ATORVASTATIN CALCIUM 40 MG: 40 TABLET, FILM COATED ORAL at 21:25

## 2021-12-23 RX ADMIN — Medication 2 PUFF: at 17:40

## 2021-12-23 RX ADMIN — MORPHINE SULFATE 15 MG: 15 TABLET, FILM COATED, EXTENDED RELEASE ORAL at 21:24

## 2021-12-23 RX ADMIN — MIDODRINE HYDROCHLORIDE 2.5 MG: 5 TABLET ORAL at 21:24

## 2021-12-23 RX ADMIN — GABAPENTIN 300 MG: 300 CAPSULE ORAL at 21:24

## 2021-12-23 RX ADMIN — GABAPENTIN 300 MG: 300 CAPSULE ORAL at 09:21

## 2021-12-23 RX ADMIN — CEFEPIME 2000 MG: 2 INJECTION, POWDER, FOR SOLUTION INTRAVENOUS at 03:32

## 2021-12-23 RX ADMIN — DULOXETINE HYDROCHLORIDE 60 MG: 60 CAPSULE, DELAYED RELEASE ORAL at 09:21

## 2021-12-23 RX ADMIN — MIDODRINE HYDROCHLORIDE 2.5 MG: 5 TABLET ORAL at 09:21

## 2021-12-23 RX ADMIN — IPRATROPIUM BROMIDE 1 SPRAY: 42 SPRAY NASAL at 09:22

## 2021-12-23 RX ADMIN — MIDODRINE HYDROCHLORIDE 2.5 MG: 5 TABLET ORAL at 15:05

## 2021-12-23 RX ADMIN — INSULIN LISPRO 6 UNITS: 100 INJECTION, SOLUTION INTRAVENOUS; SUBCUTANEOUS at 11:58

## 2021-12-23 RX ADMIN — IPRATROPIUM BROMIDE 1 SPRAY: 42 SPRAY NASAL at 21:26

## 2021-12-23 RX ADMIN — SODIUM CHLORIDE, PRESERVATIVE FREE 10 ML: 5 INJECTION INTRAVENOUS at 09:21

## 2021-12-23 RX ADMIN — SPIRONOLACTONE 25 MG: 25 TABLET ORAL at 09:21

## 2021-12-23 RX ADMIN — SODIUM CHLORIDE, PRESERVATIVE FREE 10 ML: 5 INJECTION INTRAVENOUS at 21:25

## 2021-12-23 RX ADMIN — PREDNISONE 40 MG: 20 TABLET ORAL at 09:21

## 2021-12-23 RX ADMIN — PANTOPRAZOLE SODIUM 40 MG: 40 TABLET, DELAYED RELEASE ORAL at 06:08

## 2021-12-23 RX ADMIN — SODIUM CHLORIDE 25 ML: 9 INJECTION, SOLUTION INTRAVENOUS at 03:31

## 2021-12-23 RX ADMIN — LATANOPROST 1 DROP: 50 SOLUTION OPHTHALMIC at 21:27

## 2021-12-23 RX ADMIN — VANCOMYCIN HYDROCHLORIDE 1000 MG: 1 INJECTION, POWDER, LYOPHILIZED, FOR SOLUTION INTRAVENOUS at 17:22

## 2021-12-23 ASSESSMENT — PAIN DESCRIPTION - PAIN TYPE: TYPE: CHRONIC PAIN

## 2021-12-23 ASSESSMENT — PAIN SCALES - GENERAL
PAINLEVEL_OUTOF10: 7
PAINLEVEL_OUTOF10: 7

## 2021-12-23 ASSESSMENT — PAIN DESCRIPTION - DESCRIPTORS: DESCRIPTORS: ACHING;CONSTANT

## 2021-12-23 ASSESSMENT — PAIN DESCRIPTION - PROGRESSION: CLINICAL_PROGRESSION: NOT CHANGED

## 2021-12-23 ASSESSMENT — PAIN DESCRIPTION - ONSET: ONSET: ON-GOING

## 2021-12-23 ASSESSMENT — PAIN DESCRIPTION - FREQUENCY: FREQUENCY: CONTINUOUS

## 2021-12-23 ASSESSMENT — PAIN - FUNCTIONAL ASSESSMENT: PAIN_FUNCTIONAL_ASSESSMENT: PREVENTS OR INTERFERES SOME ACTIVE ACTIVITIES AND ADLS

## 2021-12-23 ASSESSMENT — PAIN DESCRIPTION - LOCATION: LOCATION: BACK

## 2021-12-23 ASSESSMENT — PAIN DESCRIPTION - ORIENTATION: ORIENTATION: LOWER

## 2021-12-23 NOTE — PROGRESS NOTES
IM Progress Note    Admit Date:  12/20/2021    COVID-19 patient, admitted for acute on chronic hypoxic respiratory failure. She was just discharged readmitted with increased shortness of breath and hypoxia. has  HCAP    Subjective:  Ms. Nathalie Oliver is feeling much better today. Decreased cough and shortness of breath. O2 sats remained stable on 2 L.   still coughing up some sputum . she is afebrile. 12/23  Patient with increasing shortness of breath today O2 requirement up to 5 L feels a little better this afternoon O2 requirement down to 4 L. . ..       Objective:   BP (!) 97/43   Pulse 73   Temp 97.5 °F (36.4 °C) (Oral)   Resp 16   Ht 5' 8\" (1.727 m)   Wt 124 lb 12.8 oz (56.6 kg)   SpO2 95%   BMI 18.98 kg/m²       Intake/Output Summary (Last 24 hours) at 12/23/2021 1622  Last data filed at 12/23/2021 0753  Gross per 24 hour   Intake 210 ml   Output 200 ml   Net 10 ml         Physical Exam:Patient in droplet plus precautions  Gen: No distress. Alert. Awake and well-oriented. Appears fatigued and ill  Eyes: PERRL. No sclera icterus. No conjunctival injection. ENT: No discharge. Pharynx clear. Neck: No JVD. No Carotid Bruit. Trachea midline. Resp: No accessory muscle use. Diminished breath sounds . No wheezes rales or rhonchi today  CV: Regular rate. Regular rhythm. No murmur. No rub. No edema. Capillary Refill: Brisk,< 3 seconds   Peripheral Pulses: +2 palpable, equal bilaterally   GI: Non-tender. Non-distended. No masses. No organomegaly. Normal bowel sounds. No hernia. Skin: Warm and dry. No nodule on exposed extremities. No rash on exposed extremities. M/S: No cyanosis. No joint deformity. No clubbing. Neuro: Awake. Grossly nonfocal    Psych: Oriented x 3. No anxiety or agitation.         Medications:   Scheduled Meds:   predniSONE  40 mg Oral Daily    cefepime  2,000 mg IntraVENous Q12H    aspirin EC  81 mg Oral Daily    atorvastatin  40 mg Oral Nightly    calcium carbonate  500 mg Oral Daily    cetirizine  10 mg Oral Daily    clopidogrel  75 mg Oral Daily    Roflumilast  500 mcg Oral Daily    DULoxetine  60 mg Oral Daily    fluticasone  1 spray Each Nostril Daily    gabapentin  300 mg Oral BID    insulin glargine  15 Units SubCUTAneous Nightly    ipratropium  1 spray Each Nostril BID    latanoprost  1 drop Both Eyes Nightly    metoprolol succinate  12.5 mg Oral Daily    midodrine  2.5 mg Oral TID    morphine  15 mg Oral BID    pantoprazole  40 mg Oral QAM AC    spironolactone  25 mg Oral Daily    torsemide  20 mg Oral Daily    sodium chloride flush  5-40 mL IntraVENous 2 times per day    enoxaparin  30 mg SubCUTAneous BID    insulin lispro  0-12 Units SubCUTAneous TID WC    insulin lispro  0-6 Units SubCUTAneous Nightly    vancomycin  1,000 mg IntraVENous Q24H       Continuous Infusions:   dextrose      sodium chloride 25 mL (12/23/21 0331)       Data:  CBC:   Recent Labs     12/21/21  0444   WBC 5.7   RBC 3.55*   HGB 10.8*   HCT 31.9*   MCV 89.8   RDW 14.2        BMP:   Recent Labs     12/21/21  0444 12/23/21  0429   * 130*   K 4.1 3.6   CL 91* 87*   CO2 32 32   BUN 21* 25*   CREATININE 0.8 0.9     BNP: No results for input(s): BNP in the last 72 hours. PT/INR:   No results for input(s): PROTIME, INR in the last 72 hours. APTT:   No results for input(s): APTT in the last 72 hours. CARDIAC ENZYMES:   No results for input(s): CKMB, CKMBINDEX, TROPONINI in the last 72 hours. Invalid input(s): CKTOTAL;3  FASTING LIPID PANEL:  Lab Results   Component Value Date    CHOL 112 10/29/2021    HDL 34 (L) 10/29/2021    TRIG 85 10/29/2021     LIVER PROFILE:   No results for input(s): AST, ALT, ALB, BILIDIR, BILITOT, ALKPHOS in the last 72 hours.      CULTURES  Blood: pending   Sputum : pending     EKG:   Sinus rhythm with occasional Premature ventricular complexes  Possible Left atrial enlargement  Non-specific intra-ventricular conduction delay  Nonspecific ST and T wave abnormality  Abnormal ECG  When compared with ECG of 13-DEC-2021 14:26,  No significant change was found  Confirmed by Semaj Pedraza MD, [facility]     RADIOLOGY  XR CHEST PORTABLE   Final Result   Increased patchy opacities in the right lung which may reflect airspace   disease superimposed on background emphysematous changes. Assessment:  Active Problems:    Acute on chronic respiratory failure with hypoxia (HCC)    COVID    Pneumonia due to infectious organism    Hypokalemia  Resolved Problems:    * No resolved hospital problems. *      Plan:     #Acute hypoxic respiratory failure  - 2/2 PNA, COPD, and COVID 19  - patient dc'd after recent admit on 2L  -hypoxic on presentation , with O2 requirement up to 6 L . O2 was weaned down to 2 L now back up to 5 L today. Continue to monitor O2 sats closely. Janae Blew #Pneumonia   - Health care acquired: suspect a gram negative organism, also risk for MRSA  -Procalcitonin elevated 1.3  -Continue to treat with antibiotics vanc and cefepime , day #4  - check cultures - negative so far      #COPD with AE  - combivent   -On steroids  -Duo nebs as needed     #COVID 19  - in a vaccinated patient.  She completed 2 dose vaccination with Kar Oconnor in March 2021, has not received a booster dose.  She is immunocompromised on Enbrel and daily prednisone for RA  - symptoms of dyspnea present now for 8 days before this admission  - treated with decadron and remdesivir during most recent admit   - decadron resumed. Completed 10 days of Decadron now.    - Continue supplemental oxygen as needed  - appreciate pulmonary input   - droplet + isolation      #chronic diastolic CHF  - Home diuretics: Demadex 40 mg daily, spironolactone 25 mg daily- continue      #Hypokalemia   -Repleted     #Debility  -PT OT     #CAD post CABG and stents   #PVD sp right femoral popliteal bypass surgery   - cont ASA, plavix, statin, toprol      #Mild Mitral Regurgitation   - f/b cardio     #Rheumatoid arthritis   - on Enbrel and daily prednisone at home- held      #Stage 4 sacral pressure ulcer  - f/b Dr Zeinab Valentino in the [facility]  - wound RN consult     #DM2  - with hyperglycemia  - cont Lantus  - use SSI      #DANIELLE on CPAP     #Chronic pain  - cont home morphine and percocet  - cont gabapentin         DVT Prophylaxis: Lovenox  Diet: ADULT DIET;  Regular; 4 carb choices (60 gm/meal)  Code Status: Full Code            Marichuy Frank MD, MD 12/23/2021 4:22 PM

## 2021-12-23 NOTE — PROGRESS NOTES
Pulmonary Progress Note  CC: COVID-19 and respiratory failure in a vaccinated pt. Subjective:  C/o increased SOb today. C/o head ache. EXAM: BP (!) 106/53   Pulse 73   Temp 98.2 °F (36.8 °C) (Oral)   Resp 16   Ht 5' 8\" (1.727 m)   Wt 124 lb 12.8 oz (56.6 kg)   SpO2 90%   BMI 18.98 kg/m²  on 4 L  Constitutional:  No acute distress. Eyes: PERRL. Conjunctivae anicteric. ENT: Normal nose. Normal tongue. Neck:  Trachea is midline. No thyroid tenderness. Respiratory: No accessory muscle usage. decreased breath sounds. No wheezes. No rales. No Rhonchi. Cardiovascular: Normal S1S2. No digit clubbing. No digit cyanosis. No LE edema. Psychiatric: No anxiety or Agitation. Alert and Oriented to person, place and time.     Scheduled Meds:   dexamethasone  3 mg IntraVENous Daily    cefepime  2,000 mg IntraVENous Q12H    aspirin EC  81 mg Oral Daily    atorvastatin  40 mg Oral Nightly    calcium carbonate  500 mg Oral Daily    cetirizine  10 mg Oral Daily    clopidogrel  75 mg Oral Daily    Roflumilast  500 mcg Oral Daily    DULoxetine  60 mg Oral Daily    fluticasone  1 spray Each Nostril Daily    gabapentin  300 mg Oral BID    insulin glargine  15 Units SubCUTAneous Nightly    ipratropium  1 spray Each Nostril BID    latanoprost  1 drop Both Eyes Nightly    metoprolol succinate  12.5 mg Oral Daily    midodrine  2.5 mg Oral TID    morphine  15 mg Oral BID    pantoprazole  40 mg Oral QAM AC    spironolactone  25 mg Oral Daily    torsemide  20 mg Oral Daily    sodium chloride flush  5-40 mL IntraVENous 2 times per day    enoxaparin  30 mg SubCUTAneous BID    insulin lispro  0-12 Units SubCUTAneous TID WC    insulin lispro  0-6 Units SubCUTAneous Nightly    vancomycin  1,000 mg IntraVENous Q24H     Continuous Infusions:   dextrose      sodium chloride 25 mL (12/23/21 0331)     PRN Meds:  ipratropium-albuterol, albuterol sulfate HFA, cyclobenzaprine, glucose, dextrose, glucagon (rDNA), dextrose, sodium chloride flush, sodium chloride, ondansetron **OR** ondansetron, polyethylene glycol, acetaminophen **OR** acetaminophen    Labs:  CBC:   Recent Labs     12/20/21  1304 12/21/21  0444   WBC 5.8 5.7   HGB 11.8* 10.8*   HCT 35.9* 31.9*   MCV 93.5 89.8    229     BMP:   Recent Labs     12/20/21  1255 12/21/21  0444 12/23/21  0429   * 133* 130*   K 3.0* 4.1 3.6   CL 86* 91* 87*   CO2 32 32 32   BUN 24* 21* 25*   CREATININE 1.0 0.8 0.9     Micro:  12/13/2021 SARS-CoV-2 positive  12/20/2021 Procal 1.33     Imaging: Chest imaging was reviewed by me and showed   CTPA 12/13/2021  1. No evidence of pulmonary embolism or acute pulmonary abnormality. 2. Scarring at the right lung base     CXR 12/20/2021   Increased patchy opacities in the right lung which may reflect airspace   disease superimposed on background emphysematous changes.      ASSESSMENT:  · Acute hypoxic respiratory failure  · HCAP  · COVID-19 pneumonia in a fully vaccinated individual, 8 days since sx onset  · Lactic acidosis  · COPD with bronchiectasis   · Tracheomalacia    · RA on Enbrel, MTX, & Prednisone -held  · CHF  · CAD s/p CABG 5/3/21  · DANIELLE, mild - on CPAP  · Former smoker, 20 pack-yr hx, quit 1991     PLAN:  · COVID-19 isolation, droplet plus  · Supplemental O2 to maintain SaO2 >92%; monitor sats closely   · Decadron D#10/10 - change to prednisone taper for COPD  · Vancomycin/Cefepime D#4 for HCAP, monitoring of vancomycin levels to prevent toxicity. · Is on daily Azithromycin. Daliresp qd.    · Inhaled bronchodilators only as needed, MDI preferred   · resp cx  · Prophylaxis: Lovenox

## 2021-12-23 NOTE — PROGRESS NOTES
Patient resting quietly in bed. No s/s of distress noted. Shift assessment complete, see flow sheet. Noted to have shortness of breath at rest this AM. O2 88% on 3L this AM, titrated up to 5L. Denies needs. Call light in reach. Will monitor.

## 2021-12-24 LAB
ANION GAP SERPL CALCULATED.3IONS-SCNC: 10 MMOL/L (ref 3–16)
BLOOD CULTURE, ROUTINE: NORMAL
BUN BLDV-MCNC: 28 MG/DL (ref 7–20)
CALCIUM SERPL-MCNC: 8.5 MG/DL (ref 8.3–10.6)
CHLORIDE BLD-SCNC: 89 MMOL/L (ref 99–110)
CO2: 31 MMOL/L (ref 21–32)
CREAT SERPL-MCNC: 1 MG/DL (ref 0.6–1.2)
CULTURE, BLOOD 2: NORMAL
GFR AFRICAN AMERICAN: >60
GFR NON-AFRICAN AMERICAN: 55
GLUCOSE BLD-MCNC: 200 MG/DL (ref 70–99)
GLUCOSE BLD-MCNC: 222 MG/DL (ref 70–99)
GLUCOSE BLD-MCNC: 322 MG/DL (ref 70–99)
GLUCOSE BLD-MCNC: 327 MG/DL (ref 70–99)
GLUCOSE BLD-MCNC: 444 MG/DL (ref 70–99)
PERFORMED ON: ABNORMAL
POTASSIUM SERPL-SCNC: 3.2 MMOL/L (ref 3.5–5.1)
SODIUM BLD-SCNC: 130 MMOL/L (ref 136–145)
VANCOMYCIN TROUGH: 20.6 UG/ML (ref 10–20)

## 2021-12-24 PROCEDURE — 6370000000 HC RX 637 (ALT 250 FOR IP): Performed by: INTERNAL MEDICINE

## 2021-12-24 PROCEDURE — 36415 COLL VENOUS BLD VENIPUNCTURE: CPT

## 2021-12-24 PROCEDURE — 80202 ASSAY OF VANCOMYCIN: CPT

## 2021-12-24 PROCEDURE — 99232 SBSQ HOSP IP/OBS MODERATE 35: CPT | Performed by: INTERNAL MEDICINE

## 2021-12-24 PROCEDURE — 6360000002 HC RX W HCPCS: Performed by: NURSE PRACTITIONER

## 2021-12-24 PROCEDURE — 2700000000 HC OXYGEN THERAPY PER DAY

## 2021-12-24 PROCEDURE — 99233 SBSQ HOSP IP/OBS HIGH 50: CPT | Performed by: INTERNAL MEDICINE

## 2021-12-24 PROCEDURE — 6370000000 HC RX 637 (ALT 250 FOR IP): Performed by: NURSE PRACTITIONER

## 2021-12-24 PROCEDURE — 80048 BASIC METABOLIC PNL TOTAL CA: CPT

## 2021-12-24 PROCEDURE — 2060000000 HC ICU INTERMEDIATE R&B

## 2021-12-24 PROCEDURE — 2580000003 HC RX 258: Performed by: NURSE PRACTITIONER

## 2021-12-24 PROCEDURE — 94761 N-INVAS EAR/PLS OXIMETRY MLT: CPT

## 2021-12-24 RX ORDER — POTASSIUM CHLORIDE 20 MEQ/1
40 TABLET, EXTENDED RELEASE ORAL ONCE
Status: COMPLETED | OUTPATIENT
Start: 2021-12-24 | End: 2021-12-24

## 2021-12-24 RX ORDER — POTASSIUM CHLORIDE 20 MEQ/1
20 TABLET, EXTENDED RELEASE ORAL
Status: DISCONTINUED | OUTPATIENT
Start: 2021-12-25 | End: 2021-12-28 | Stop reason: HOSPADM

## 2021-12-24 RX ADMIN — SODIUM CHLORIDE, PRESERVATIVE FREE 10 ML: 5 INJECTION INTRAVENOUS at 21:04

## 2021-12-24 RX ADMIN — CALCIUM CARBONATE (ANTACID) CHEW TAB 500 MG 500 MG: 500 CHEW TAB at 08:35

## 2021-12-24 RX ADMIN — ROFLUMILAST 500 MCG: 500 TABLET ORAL at 08:35

## 2021-12-24 RX ADMIN — METOPROLOL SUCCINATE 12.5 MG: 25 TABLET, EXTENDED RELEASE ORAL at 08:35

## 2021-12-24 RX ADMIN — TORSEMIDE 20 MG: 20 TABLET ORAL at 08:35

## 2021-12-24 RX ADMIN — CEFEPIME 2000 MG: 2 INJECTION, POWDER, FOR SOLUTION INTRAVENOUS at 14:35

## 2021-12-24 RX ADMIN — MORPHINE SULFATE 15 MG: 15 TABLET, FILM COATED, EXTENDED RELEASE ORAL at 21:03

## 2021-12-24 RX ADMIN — CEFEPIME 2000 MG: 2 INJECTION, POWDER, FOR SOLUTION INTRAVENOUS at 03:17

## 2021-12-24 RX ADMIN — PANTOPRAZOLE SODIUM 40 MG: 40 TABLET, DELAYED RELEASE ORAL at 06:56

## 2021-12-24 RX ADMIN — GABAPENTIN 300 MG: 300 CAPSULE ORAL at 21:03

## 2021-12-24 RX ADMIN — CETIRIZINE HYDROCHLORIDE 10 MG: 10 TABLET ORAL at 08:35

## 2021-12-24 RX ADMIN — INSULIN LISPRO 8 UNITS: 100 INJECTION, SOLUTION INTRAVENOUS; SUBCUTANEOUS at 11:52

## 2021-12-24 RX ADMIN — INSULIN LISPRO 8 UNITS: 100 INJECTION, SOLUTION INTRAVENOUS; SUBCUTANEOUS at 17:15

## 2021-12-24 RX ADMIN — IPRATROPIUM BROMIDE 1 SPRAY: 42 SPRAY NASAL at 08:36

## 2021-12-24 RX ADMIN — POTASSIUM CHLORIDE 40 MEQ: 1500 TABLET, EXTENDED RELEASE ORAL at 12:50

## 2021-12-24 RX ADMIN — GABAPENTIN 300 MG: 300 CAPSULE ORAL at 08:34

## 2021-12-24 RX ADMIN — DULOXETINE HYDROCHLORIDE 60 MG: 60 CAPSULE, DELAYED RELEASE ORAL at 08:35

## 2021-12-24 RX ADMIN — FLUTICASONE PROPIONATE 1 SPRAY: 50 SPRAY, METERED NASAL at 08:36

## 2021-12-24 RX ADMIN — MIDODRINE HYDROCHLORIDE 2.5 MG: 5 TABLET ORAL at 08:35

## 2021-12-24 RX ADMIN — ASPIRIN 81 MG: 81 TABLET, COATED ORAL at 08:35

## 2021-12-24 RX ADMIN — MORPHINE SULFATE 15 MG: 15 TABLET, FILM COATED, EXTENDED RELEASE ORAL at 08:35

## 2021-12-24 RX ADMIN — VANCOMYCIN HYDROCHLORIDE 750 MG: 750 INJECTION, POWDER, LYOPHILIZED, FOR SOLUTION INTRAVENOUS at 21:06

## 2021-12-24 RX ADMIN — ATORVASTATIN CALCIUM 40 MG: 40 TABLET, FILM COATED ORAL at 21:03

## 2021-12-24 RX ADMIN — CLOPIDOGREL BISULFATE 75 MG: 75 TABLET ORAL at 08:35

## 2021-12-24 RX ADMIN — SODIUM CHLORIDE, PRESERVATIVE FREE 10 ML: 5 INJECTION INTRAVENOUS at 08:37

## 2021-12-24 RX ADMIN — IPRATROPIUM BROMIDE 1 SPRAY: 42 SPRAY NASAL at 21:11

## 2021-12-24 RX ADMIN — SPIRONOLACTONE 25 MG: 25 TABLET ORAL at 08:34

## 2021-12-24 RX ADMIN — INSULIN GLARGINE 15 UNITS: 100 INJECTION, SOLUTION SUBCUTANEOUS at 21:15

## 2021-12-24 RX ADMIN — PREDNISONE 40 MG: 20 TABLET ORAL at 08:35

## 2021-12-24 RX ADMIN — ENOXAPARIN SODIUM 30 MG: 100 INJECTION SUBCUTANEOUS at 21:03

## 2021-12-24 RX ADMIN — INSULIN LISPRO 4 UNITS: 100 INJECTION, SOLUTION INTRAVENOUS; SUBCUTANEOUS at 08:37

## 2021-12-24 RX ADMIN — MIDODRINE HYDROCHLORIDE 2.5 MG: 5 TABLET ORAL at 21:03

## 2021-12-24 RX ADMIN — MIDODRINE HYDROCHLORIDE 2.5 MG: 5 TABLET ORAL at 14:34

## 2021-12-24 RX ADMIN — ENOXAPARIN SODIUM 30 MG: 100 INJECTION SUBCUTANEOUS at 08:34

## 2021-12-24 RX ADMIN — LATANOPROST 1 DROP: 50 SOLUTION OPHTHALMIC at 21:11

## 2021-12-24 RX ADMIN — INSULIN LISPRO 6 UNITS: 100 INJECTION, SOLUTION INTRAVENOUS; SUBCUTANEOUS at 21:14

## 2021-12-24 ASSESSMENT — PAIN DESCRIPTION - FREQUENCY
FREQUENCY: CONTINUOUS
FREQUENCY: CONTINUOUS

## 2021-12-24 ASSESSMENT — PAIN DESCRIPTION - PROGRESSION
CLINICAL_PROGRESSION: NOT CHANGED

## 2021-12-24 ASSESSMENT — PAIN DESCRIPTION - ONSET
ONSET: ON-GOING
ONSET: ON-GOING

## 2021-12-24 ASSESSMENT — PAIN DESCRIPTION - LOCATION
LOCATION: BACK
LOCATION: BACK

## 2021-12-24 ASSESSMENT — PAIN SCALES - GENERAL
PAINLEVEL_OUTOF10: 7
PAINLEVEL_OUTOF10: 8

## 2021-12-24 ASSESSMENT — PAIN DESCRIPTION - DESCRIPTORS
DESCRIPTORS: ACHING;CONSTANT
DESCRIPTORS: ACHING;CONSTANT

## 2021-12-24 ASSESSMENT — PAIN - FUNCTIONAL ASSESSMENT
PAIN_FUNCTIONAL_ASSESSMENT: PREVENTS OR INTERFERES WITH MANY ACTIVE NOT PASSIVE ACTIVITIES
PAIN_FUNCTIONAL_ASSESSMENT: PREVENTS OR INTERFERES SOME ACTIVE ACTIVITIES AND ADLS

## 2021-12-24 ASSESSMENT — PAIN DESCRIPTION - ORIENTATION
ORIENTATION: LOWER
ORIENTATION: LOWER

## 2021-12-24 ASSESSMENT — PAIN DESCRIPTION - PAIN TYPE
TYPE: CHRONIC PAIN
TYPE: CHRONIC PAIN

## 2021-12-24 NOTE — PROGRESS NOTES
Vancomycin level was drawn as medication was infusing. However, according to [employer] modeling, vancomycin dose is appropriate and will produce an AUC of 534. Continue same dose and schedule. Will obtain another level prior to tomorrow's dose just to confirm dose is appropriate. Insight Summary:    Regimen: 1000 mg IV every 24 hours.   Start time: 17:22 on 12/24/2021  Exposure target: AUC24 (range)400-600 mg/L.hr   AUC24,ss: 533 mg/L.hr  Probability of AUC24 > 400: 89 %  Ctrough,ss: 14.5 mg/L  Probability of Ctrough,ss > 20: 19 %  Probability of nephrotoxicity (Lodise ULYSSES 2009): 10 %

## 2021-12-24 NOTE — CARE COORDINATION
INTERDISCIPLINARY PLAN OF CARE CONFERENCE    Date/Time: 12/24/2021 1:55 PM  Completed by: Dayanna Daniels RN, Case Management      Patient Name:  Liseth Fisher  YOB: 1950  Admitting Diagnosis: Hypokalemia [E87.6]  Acute respiratory failure with hypoxia ([address] [J96.01]  Acute on chronic respiratory failure with hypoxia ([address] [J96.21]  Pneumonia due to infectious organism, unspecified laterality, unspecified part of lung [J18.9]  COVID [U07.1]     Admit Date/Time:  12/20/2021 12:44 PM    Chart reviewed. Interdisciplinary team contacted or reviewed plan related to patient progress and discharge plans. Disciplines included Case Management, Nursing, and Dietitian. Current Status:ongoing  PT/OT recommendation for discharge plan of care: await eval--orders placed    Expected D/C Disposition:  Home  Confirmed plan with patient and/or family Yes confirmed with: (name) pt and she confirmed with her daughter, Amy Shultz  Met with:pt and she confirmed with her daughterAmy    Discharge Plan Comments: Reviewed chart. Role of discharge planner explained and patient verbalized understanding. Pt confirmed with her daughter, Amy Shultz. Pt is from home with her daughterAmy and plans to return. Pt is active with [facility] (+HC orders) and wants [facility] for PT/OT/SN. Pt is active with [facility] for home O2 (2L baseline d/t covid). Covid pos as of 12/13. Home O2 in place on admit: Yes  Pt informed of need to bring portable home O2 tank on day of discharge for nursing to connect prior to leaving:  Yes  Verbalized agreement/Understanding:   Yes

## 2021-12-24 NOTE — PROGRESS NOTES
Pulmonary Progress Note  Hospital Day: 5   CC: COVID-19 and respiratory failure in a vaccinated pt. Subjective:    Continues on 4 L O2, no baseline O2 prior to COVID  Feels dramatically better than on admission    EXAM: BP (!) 96/53   Pulse 73   Temp 97 °F (36.1 °C) (Oral)   Resp 18   Ht 5' 8\" (1.727 m)   Wt 124 lb 13 oz (56.6 kg)   SpO2 97%   BMI 18.98 kg/m²  on 4 L, I turned down to 3 L  General: ill appearing. Eyes: PERRL. No sclera icterus. No conjunctival injection. ENT: No discharge. Pharynx clear. Neck: Trachea midline. Normal thyroid. Resp: No accessory muscle use. No crackles. No wheezing. No rhonchi. No dullness on percussion. Markedly diminished breath sounds  CV: Regular rate. Regular rhythm. No mumur or rub. No edema. Peripheral pulses are 2+. Capillary refill is less than 3 seconds. GI: Non-tender. Non-distended. No masses. No organomegaly. Normal bowel sounds. No hernia. Skin: Warm and dry. No nodule on exposed extremities. No rash on exposed extremities. Lymph: No cervical LAD. No supraclavicular LAD. M/S: No cyanosis. No joint deformity. No clubbing. Neuro: Alert and oriented x3. Patellar reflexes are symmetric. Psych: No agitation, no anxiety, affect is full.      Scheduled Meds:   predniSONE  40 mg Oral Daily    cefepime  2,000 mg IntraVENous Q12H    aspirin EC  81 mg Oral Daily    atorvastatin  40 mg Oral Nightly    calcium carbonate  500 mg Oral Daily    cetirizine  10 mg Oral Daily    clopidogrel  75 mg Oral Daily    Roflumilast  500 mcg Oral Daily    DULoxetine  60 mg Oral Daily    fluticasone  1 spray Each Nostril Daily    gabapentin  300 mg Oral BID    insulin glargine  15 Units SubCUTAneous Nightly    ipratropium  1 spray Each Nostril BID    latanoprost  1 drop Both Eyes Nightly    metoprolol succinate  12.5 mg Oral Daily    midodrine  2.5 mg Oral TID    morphine  15 mg Oral BID    pantoprazole  40 mg Oral QAM AC    spironolactone  25 mg Oral Daily    torsemide  20 mg Oral Daily    sodium chloride flush  5-40 mL IntraVENous 2 times per day    enoxaparin  30 mg SubCUTAneous BID    insulin lispro  0-12 Units SubCUTAneous TID WC    insulin lispro  0-6 Units SubCUTAneous Nightly    vancomycin  1,000 mg IntraVENous Q24H     Continuous Infusions:   dextrose      sodium chloride 25 mL (12/23/21 0331)     PRN Meds:  ipratropium-albuterol, albuterol sulfate HFA, cyclobenzaprine, glucose, dextrose, glucagon (rDNA), dextrose, sodium chloride flush, sodium chloride, ondansetron **OR** ondansetron, polyethylene glycol, acetaminophen **OR** acetaminophen    Labs:  CBC:   No results for input(s): WBC, HGB, HCT, MCV, PLT in the last 72 hours. BMP:   Recent Labs     12/23/21  0429 12/24/21  0432   * 130*   K 3.6 3.2*   CL 87* 89*   CO2 32 31   BUN 25* 28*   CREATININE 0.9 1.0     Micro:  12/13/2021 SARS-CoV-2 positive  12/20/2021 BD NGTD  12/20/2021 Procal 1.33  12/22/2021 sputum cx pending     Imaging: Chest imaging was reviewed by me and showed   CTPA 12/13/2021  1. No evidence of pulmonary embolism or acute pulmonary abnormality.   2. Scarring at the right lung base     CXR 12/20/2021   Increased patchy opacities in the right lung which may reflect airspace   disease superimposed on background emphysematous changes.      ASSESSMENT:  · Acute hypoxic respiratory failure; no baseline O2 prior to COVID  - sent home on 2 L O2 upon d/c 12/16/21 for COVID-19 pneumonia  · HCAP  · COVID-19 pneumonia in a fully vaccinated individual, sx onset 12/13/21  · Lactic acidosis  · COPD with bronchiectasis   · Tracheomalacia    · RA on Enbrel, MTX, & Prednisone -held  · CHF  · CAD s/p CABG 5/3/21  · DANIELLE, mild - on CPAP  · Former smoker, 20 pack-yr hx, quit 1991     PLAN:  · COVID-19 isolation, droplet plus  · Supplemental O2 to maintain SaO2 >92%; monitor sats closely   · Prednisone taper for COPD exacerbation, initially treated with Decadron for 10 days for Covid  · Vancomycin/Cefepime D#5 for HCAP, monitoring of Vanc levels to prevent toxicity  · On daily Azithromycin & Daliresp - plan to resume at d/c   · Inhaled bronchodilators only as needed, MDI preferred   · Follow sputum cx   · Prophylaxis: Lovenox 30 BID  · OOB to chair BID

## 2021-12-24 NOTE — PROGRESS NOTES
IM Progress Note    Admit Date:  12/20/2021    COVID-19 patient, admitted for acute on chronic hypoxic respiratory failure. She was just discharged readmitted with increased shortness of breath and hypoxia. has  HCAP    Subjective:  Ms. Angelica Harman is feeling  better today. Decreased cough and shortness of breath. O2 requirement is up to 5 L yesterday , O2 sats currently stable on 3 L. Respiratory cultures growing MRSA.   she is afebrile. Objective:   BP (!) 115/52   Pulse 81   Temp 96.5 °F (35.8 °C) (Oral)   Resp 18   Ht 5' 8\" (1.727 m)   Wt 124 lb 13 oz (56.6 kg)   SpO2 97%   BMI 18.98 kg/m²       Intake/Output Summary (Last 24 hours) at 12/24/2021 1238  Last data filed at 12/24/2021 1218  Gross per 24 hour   Intake 330 ml   Output 1600 ml   Net -1270 ml         Physical Exam:  Patient in droplet plus precautions  Gen: No distress. Alert. Awake and well-oriented. She looks better today  Eyes: PERRL. No sclera icterus. No conjunctival injection. ENT: No discharge. Pharynx clear. Neck: No JVD. No Carotid Bruit. Trachea midline. Resp: No accessory muscle use. Diminished breath sounds . No wheezes rales or rhonchi today  CV: Regular rate. Regular rhythm. No murmur. No rub. No edema. Capillary Refill: Brisk,< 3 seconds   Peripheral Pulses: +2 palpable, equal bilaterally   GI: Non-tender. Non-distended. No masses. No organomegaly. Normal bowel sounds. No hernia. Skin: Warm and dry. No nodule on exposed extremities. No rash on exposed extremities. M/S: No cyanosis. No joint deformity. No clubbing. Neuro: Awake. Grossly nonfocal    Psych: Oriented x 3. No anxiety or agitation.         Medications:   Scheduled Meds:   potassium chloride  40 mEq Oral Once    predniSONE  40 mg Oral Daily    cefepime  2,000 mg IntraVENous Q12H    aspirin EC  81 mg Oral Daily    atorvastatin  40 mg Oral Nightly    calcium carbonate  500 mg Oral Daily    cetirizine  10 mg Oral Daily    clopidogrel  75 mg Oral Daily    Roflumilast  500 mcg Oral Daily    DULoxetine  60 mg Oral Daily    fluticasone  1 spray Each Nostril Daily    gabapentin  300 mg Oral BID    insulin glargine  15 Units SubCUTAneous Nightly    ipratropium  1 spray Each Nostril BID    latanoprost  1 drop Both Eyes Nightly    metoprolol succinate  12.5 mg Oral Daily    midodrine  2.5 mg Oral TID    morphine  15 mg Oral BID    pantoprazole  40 mg Oral QAM AC    spironolactone  25 mg Oral Daily    torsemide  20 mg Oral Daily    sodium chloride flush  5-40 mL IntraVENous 2 times per day    enoxaparin  30 mg SubCUTAneous BID    insulin lispro  0-12 Units SubCUTAneous TID WC    insulin lispro  0-6 Units SubCUTAneous Nightly    vancomycin  1,000 mg IntraVENous Q24H       Continuous Infusions:   dextrose      sodium chloride 25 mL (12/23/21 0331)       Data:  CBC:   No results for input(s): WBC, RBC, HGB, HCT, MCV, RDW, PLT in the last 72 hours. BMP:   Recent Labs     12/23/21  0429 12/24/21  0432   * 130*   K 3.6 3.2*   CL 87* 89*   CO2 32 31   BUN 25* 28*   CREATININE 0.9 1.0     BNP: No results for input(s): BNP in the last 72 hours. PT/INR:   No results for input(s): PROTIME, INR in the last 72 hours. APTT:   No results for input(s): APTT in the last 72 hours. CARDIAC ENZYMES:   No results for input(s): CKMB, CKMBINDEX, TROPONINI in the last 72 hours. Invalid input(s): CKTOTAL;3  FASTING LIPID PANEL:  Lab Results   Component Value Date    CHOL 112 10/29/2021    HDL 34 (L) 10/29/2021    TRIG 85 10/29/2021     LIVER PROFILE:   No results for input(s): AST, ALT, ALB, BILIDIR, BILITOT, ALKPHOS in the last 72 hours.      CULTURES  Blood: No growth to date   Sputum : Positive for MRSA     EKG:   Sinus rhythm with occasional Premature ventricular complexes  Possible Left atrial enlargement  Non-specific intra-ventricular conduction delay  Nonspecific ST and T wave abnormality  Abnormal ECG  When compared with ECG of 13-DEC-2021 14:26,  No significant change was found  Confirmed by Jennifer James MD, [facility]     RADIOLOGY  XR CHEST PORTABLE   Final Result   Increased patchy opacities in the right lung which may reflect airspace   disease superimposed on background emphysematous changes. Assessment:  Active Problems:    Acute on chronic respiratory failure with hypoxia (HCC)    COVID    Pneumonia due to infectious organism    Hypokalemia  Resolved Problems:    * No resolved hospital problems. *      Plan:     #Acute hypoxic respiratory failure  - 2/2 PNA, COPD, and COVID 19  - patient dc'd after recent admit on 2L  -hypoxic on presentation , with O2 requirement up to 6 L . O2 was weaned down to 2 L -> then  back up to 5 L on 12/23. O2 requirement down to 3 L today. Continue to monitor O2 sats closely. Aleyda Sharp #Pneumonia   - Health care acquired: Due to MRSA . Respiratory cultures positive for MRSA . -Procalcitonin elevated 1.3  -Continue to treat with antibiotics vanc and cefepime , day #5   likely can be switched to doxycycline on discharge  Plan DC tomorrow      #COPD with AE  - combivent   -On steroids  -Duo nebs as needed     #COVID 19  - in a vaccinated patient.  She completed 2 dose vaccination with [employer] in March 2021, has not received a booster dose.  She is immunocompromised on Enbrel and daily prednisone for RA  - symptoms of dyspnea present now for 8 days before this admission  - treated with decadron and remdesivir during most recent admit   - decadron resumed. Completed 10 days of Decadron now.    - Continue supplemental oxygen as needed  - appreciate pulmonary input   - droplet + isolation      #chronic diastolic CHF  - Home diuretics: Demadex 40 mg daily, spironolactone 25 mg daily- continue      #Hypokalemia   -Replete     #Debility  -PT OT     #CAD post CABG and stents   #PVD sp right femoral popliteal bypass surgery   - cont ASA, plavix, statin, toprol      #Mild Mitral Regurgitation   - f/b cardio     #Rheumatoid arthritis   - on Enbrel and daily prednisone at home- held      #Stage 4 sacral pressure ulcer  - f/b [facility] Abdullahi Hernandez [facility]  - wound RN consult     #DM2  - with hyperglycemia  - cont Lantus  - use SSI      #DANIELLE on CPAP     #Chronic pain  - cont home morphine and percocet  - cont gabapentin         DVT Prophylaxis: Lovenox  Diet: ADULT DIET; Regular; 4 carb choices (60 gm/meal)  Code Status: Full Code        She is improving. . Plan on discharging home in [facility] She currently has home O2 2 L.      Deanna Guzmán MD, MD 12/24/2021 12:38 PM

## 2021-12-24 NOTE — FLOWSHEET NOTE
12/24/21 0829   Vital Signs   Temp 96.5 °F (35.8 °C)   Temp Source Oral   Pulse 81   Resp 18   BP (!) 115/52   BP Location Left upper arm   Level of Consciousness Alert (0)   MEWS Score 1   Oxygen Therapy   SpO2 97 %   O2 Device Nasal cannula   O2 Flow Rate (L/min) 3 L/min     Patient resting quietly in bed. No s/s of distress noted. Shift assessment complete, see flow sheet. Denies needs at this time. Call light in reach. Will monitor.

## 2021-12-24 NOTE — PROGRESS NOTES
Received call from [facility] lab for resp culture + for MRSA. Dr. Lady Garcia aware.

## 2021-12-24 NOTE — DISCHARGE INSTR - COC
Continuity of Care Form    Patient Name: Evangelista Kim   :  1950  MRN:  [id number]    Admit date:  2021  Discharge date:  ***    Code Status Order: Full Code   Advance Directives:   Advance Care Flowsheet Documentation       Date/Time Healthcare Directive Type of Healthcare Directive Copy in [address] Agent's Name Healthcare Agent's Phone Number    21 -- Durable power of  for health care Yes, copy in chart Adult Children -- --            Admitting Physician:  Ella Castellanos MD  PCP: Luis Eduardo Serrano MD    Discharging Nurse: [facility] Unit/Room#: /4486-97  Discharging Unit Phone Number: ***    Emergency Contact:   Extended Emergency Contact Information  Primary Emergency Contact: [facility] of [address] Phone: [phone]  Mobile Phone: [phone]  Relation: Child  Secondary Emergency Contact: [place] of [address] [facility] Phone: [phone]  Mobile Phone: [phone]  Relation: Child    Past Surgical History:  Past Surgical History:   Procedure Laterality Date    ARTERY BIOPSY Right 2021    at [address]  2013    left temporal artery biopsy    BACK SURGERY  2020    BRONCHOSCOPY      BRONCHOSCOPY N/A 2019    BRONCHOSCOPY ALVEOLAR LAVAGE performed by Volodymyr Worley MD at [address]  2021    CATARACT REMOVAL      COLONOSCOPY      CORONARY ARTERY BYPASS GRAFT N/A 2021    CORONARY ARTERY BYPASS X3 WITH LEFT ATRIAL APPENDAGE CLIP, 5 LEVEL BILATERAL INTERCOSTAL NERVE BLOCK, STERNAL PLATING performed by Raheem Garcia MD at [address] CATH  2021    IR MIDLINE CATH 2021 C/Stan Somers Final ARTHROSCOPY      left    KYPHOSIS SURGERY      LAMINECTOMY      LEG SURGERY Left 2021    INCISION AND DEBRIDEMENT LEFT LOWER LEG WOUND WITH POSSIBLE WOUND VAC PLACEMENT performed by Amalia Chavez MD at [address]  02/2020    MEDIASTINOSCOPY N/A 05/05/2021    MEDIASTINAL EXPLORATION AND EVACUATION OF HEMATOMA performed by Venu Lawrence MD at [place] [address]  01/08/2021    sacral wound debridement    PRESSURE ULCER DEBRIDEMENT N/A 01/08/2021    SACRAL WOUND DEBRIDEMENT performed by Carmine Thacker MD at [address] [facility]  05/07/2013    FESS with balloon    SPINAL FUSION      TRANSESOPHAGEAL ECHOCARDIOGRAM  11/02/2021    TUBAL LIGATION      UPPER GASTROINTESTINAL ENDOSCOPY  04/08/2014    Dilitation       Immunization History:   Immunization History   Administered Date(s) Administered    COVID-19, Pfizer, PF, 30mcg/0.3mL 02/11/2021, 03/04/2021    Influenza Virus Vaccine 10/03/2019    Influenza, High Dose (Fluzone 65 yrs and older) 11/22/2017, 11/08/2018, 09/21/2020    PPD Test 12/02/2014    Pneumococcal Conjugate 13-valent (Sqrsjey79) 11/26/2013    Pneumococcal Conjugate 7-valent (Prevnar7) 11/26/2013    Pneumococcal Polysaccharide (Bblsrhzeh17) 10/23/2017    Td Vaccine >6[facility] 01/01/2007       Active Problems:  Patient Active Problem List   Diagnosis Code    Rheumatoid arthritis ([facility] M06.9    Psoriasis L40.9    GERD (gastroesophageal reflux disease) K21.9    Anemia D64.9    Cylindrical bronchiectasis (HCC) J47.9    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    Immunocompromised state ([facility] D84.9    Coronary artery disease I25.10    Bilateral lower extremity edema R60.0    Essential hypertension I10    Lumbar spondylosis M47.816    Mitral valve insufficiency and aortic valve insufficiency I08.0    Mixed hyperlipidemia E78.2    Myopia of both eyes H52.13    Osteoporosis M81.0    Other chronic sinusitis J32.8    Primary open angle glaucoma (POAG) of both eyes, mild stage H40.1131    Primary osteoarthritis of right hip M16.11    Type 2 diabetes mellitus with unspecified diabetic retinopathy without macular edema ([facility]) E11.319    Type 2 diabetes mellitus with diabetic peripheral angiopathy without gangrene, with long-term current use of insulin ([facility]) E11.51, Z79.4    Pressure ulcer of coccygeal region, stage 4 ([facility]) L89.154    Hx of CABG Z95.1    Mild malnutrition ([facility]) E44.1    Acute on chronic diastolic CHF (congestive heart failure) ([facility]) I50.33    Chronic obstructive pulmonary disease ([facility]) J44.9    Nonrheumatic mitral (valve) insufficiency I34.0    Acute respiratory failure with hypoxia ([facility]) J96.01    COVID-19 U07.1    Acute on chronic respiratory failure with hypoxia ([facility]) J96.21    HCAP (healthcare-associated pneumonia) J18.9    COVID U07.1    Pneumonia due to infectious organism J18.9    Hypokalemia E87.6       Isolation/Infection:   Isolation            Droplet Plus  Contact          Patient Infection Status       Infection Onset Added Last Indicated Last Indicated By Review Planned Expiration Resolved Resolved By    COVID-19 12/13/21 12/13/21 12/13/21 COVID-19 & Influenza Combo 12/20/21 12/27/21      MRSA 07/07/21 07/09/21 12/22/21 Culture, Respiratory        Resolved    COVID-19 (Rule Out) 12/13/21 12/13/21 12/13/21 COVID-19 & Influenza Combo (Ordered)   12/13/21 Rule-Out Test Resulted    COVID-19 (Rule Out) 10/27/21 10/27/21 10/27/21 COVID-19 (Ordered)   10/28/21 Rule-Out Test Resulted    COVID-19 (Rule Out) 08/10/21 08/10/21 08/10/21 COVID-19, Rapid (Ordered)   08/10/21 Rule-Out Test Resulted    COVID-19 (Rule Out) 04/23/21 04/23/21 04/23/21 COVID-19, Rapid (Ordered)   04/23/21 Rule-Out Test Resulted    COVID-19 (Rule Out) 01/04/21 01/04/21 01/04/21 COVID-19 (Ordered)   01/05/21 Rule-Out Test Resulted    COVID-19 (Rule Out) 06/28/20 06/28/20 06/28/20 COVID-19 (Ordered)   06/29/20 Rule-Out Test Resulted            Nurse Assessment:  Last Vital Signs: BP (!) 115/52   Pulse 81   Temp 96.5 °F (35.8 °C) (Oral)   Resp 18   Ht 5' 8\" (1.727 m) Wt 124 lb 13 oz (56.6 kg)   SpO2 97%   BMI 18.98 kg/m²     Last documented pain score (0-10 scale): Pain Level: 7  Last Weight:   Wt Readings from Last 1 Encounters:   12/24/21 124 lb 13 oz (56.6 kg)     Mental Status:  oriented and alert    IV Access:  - None    Nursing Mobility/ADLs:  Walking   Assisted  Transfer  Independent  Bathing  Assisted  Dressing  Independent  [address] Delivery   none    Wound Care Documentation and Therapy:  Wound 12/18/20 #2, Sacrum, Pressure Injury, Stage 4, Onset 5/2020 (Active)   Wound Image   12/22/21 0900   Wound Etiology Pressure Stage  4 12/24/21 0830   Dressing Status New dressing applied 12/23/21 1356   Wound Cleansed Cleansed with saline;Irrigated with saline 12/23/21 1356   Dressing/Treatment Alginate; Foam 12/23/21 1356   Wound Length (cm) 2.8 cm 12/01/21 0925   Wound Width (cm) 1 cm 12/01/21 0925   Wound Depth (cm) 1.1 cm 12/01/21 0925   Wound Surface Area (cm^2) 2.8 cm^2 12/01/21 0925   Change in Wound Size % (l*w) -1766.67 12/01/21 0925   Wound Volume (cm^3) 3.08 cm^3 12/01/21 0925   Wound Healing % -3322 12/01/21 0925   Post-Procedure Length (cm) 2.8 cm 12/01/21 0957   Post-Procedure Width (cm) 1 cm 12/01/21 0957   Post-Procedure Depth (cm) 1.1 cm 12/01/21 0957   Post-Procedure Surface Area (cm^2) 2.8 cm^2 12/01/21 0957   Post-Procedure Volume (cm^3) 3.08 cm^3 12/01/21 0957   Distance Tunneling (cm) 0 cm 12/01/21 0957   Undermining Starts ___ O'Clock 6 12/01/21 0957   Undermining Ends___ O'Clock 11 12/01/21 0957   Undermining Maxium Distance (cm) 2 12/01/21 0957   Wound Assessment Pink/red 12/23/21 1356   Drainage Amount Moderate 12/23/21 1356   Drainage Description Yellow 12/23/21 1356   Odor None 12/23/21 1356   Blanca-wound Assessment Maceration 12/23/21 1356   Number of days: 371       Wound 12/20/21 Hip Right (Active)   Wound Image   12/22/21 0900   Wound Etiology Pressure Stage  2 12/24/21 0830 Dressing Status New dressing applied 12/23/21 1356   Wound Cleansed Cleansed with saline;Irrigated with saline 12/23/21 1356   Dressing/Treatment Alginate; Foam 12/23/21 1356   Wound Assessment Pink/red 12/23/21 1356   Drainage Amount Small 12/23/21 1356   Drainage Description Serosanguinous 12/23/21 1356   Number of days: 4       Wound 12/20/21 Heel Left DTI (Active)   Wound Image   12/22/21 0900   Wound Etiology Deep tissue/Injury 12/24/21 0830   Dressing/Treatment Foam 12/22/21 0900   Number of days: 4        Elimination:  Continence: Bowel: Yes  Bladder: Yes  Urinary Catheter: None   Colostomy/Ileostomy/Ileal Conduit: No       Date of Last BM: 12/28/2021      Intake/Output Summary (Last 24 hours) at 12/24/2021 1346  Last data filed at 12/24/2021 1218  Gross per 24 hour   Intake 330 ml   Output 1600 ml   Net -1270 ml     I/O last 3 completed shifts: In: 180 [P.O.:180]  Out: 600 [Urine:600]    Safety Concerns:     History of Falls (last 30 days) and At Risk for Falls    Impairments/Disabilities:      None    Nutrition Therapy:  Current Nutrition Therapy:   - Oral Diet:  General    Routes of Feeding: Oral  Liquids: No Restrictions  Daily Fluid Restriction: yes - amount 1500  Last Modified Barium Swallow with Video (Video Swallowing Test): not done    Treatments at the Time of Hospital Discharge:   Respiratory Treatments: inhalers   Oxygen Therapy:  is on oxygen at 2 L/min per nasal cannula.   Ventilator:    - No ventilator support    Rehab Therapies: Physical Therapy, Occupational Therapy, and SN  Weight Bearing Status/Restrictions: No weight bearing restirctions  Other Medical Equipment (for information only, NOT a DME order):  cane and walker  Other Treatments:     Patient's personal belongings (please select all that are sent with patient):  Pt reports she has all here personal belongings    RN SIGNATURE:  {Esignature:[id number]}    CASE MANAGEMENT/SOCIAL WORK SECTION    Inpatient Status Date: 12/20/20201    Readmission Risk Assessment Score:  Readmission Risk              Risk of Unplanned Readmission:  43           Discharging to Facility/ Agency   Name: [facility]  Address: [address] [place] [address], [address] [place] [address]  Phone: [phone]  Fax: [phone]        / signature: Electronically signed by Asaf Posey RN on 12/28/21 at 1:48 PM EST    PHYSICIAN SECTION    Prognosis: {Prognosis:1849092551}    Condition at Discharge: 50Nita Valdez Patient Condition:[id number]}    Rehab Potential (if transferring to Rehab): {Prognosis:[id number]}    Recommended Labs or Other Treatments After Discharge: ***    Physician Certification: I certify the above information and transfer of Jada Cervantes  is necessary for the continuing treatment of the diagnosis listed and that she requires Home Care for less 30 days.      Update Admission H&P: {CHP DME Changes in FPROC:[id number]}    PHYSICIAN SIGNATURE:  Electronically signed by ALEXANDRIA Carter/Enriqueta Marvin RN on 12/28/21 at 1:47 PM EST

## 2021-12-24 NOTE — PROGRESS NOTES
Vanco:  Trough 20.6mcg/mL. Will decrease dose a bit to 750mg Q24hrs starting later this evening. Recheck a Trough 12/26 at 2030.   Heri Lovell, [facility] PharmD 12/24/2021 6:03 PM

## 2021-12-24 NOTE — PROGRESS NOTES
Pt is lying in bed with their eyes closed. Respirations are easy and even. Call light within reach bed in lowest position with the wheels locked. Will continue to monitor.  Marni Kathleen RN

## 2021-12-25 LAB
ANION GAP SERPL CALCULATED.3IONS-SCNC: 9 MMOL/L (ref 3–16)
BUN BLDV-MCNC: 30 MG/DL (ref 7–20)
CALCIUM SERPL-MCNC: 8.2 MG/DL (ref 8.3–10.6)
CHLORIDE BLD-SCNC: 89 MMOL/L (ref 99–110)
CO2: 30 MMOL/L (ref 21–32)
CREAT SERPL-MCNC: 0.9 MG/DL (ref 0.6–1.2)
CULTURE, RESPIRATORY: ABNORMAL
CULTURE, RESPIRATORY: ABNORMAL
GFR AFRICAN AMERICAN: >60
GFR NON-AFRICAN AMERICAN: >60
GLUCOSE BLD-MCNC: 262 MG/DL (ref 70–99)
GLUCOSE BLD-MCNC: 371 MG/DL (ref 70–99)
GLUCOSE BLD-MCNC: 386 MG/DL (ref 70–99)
GLUCOSE BLD-MCNC: 420 MG/DL (ref 70–99)
GLUCOSE BLD-MCNC: 454 MG/DL (ref 70–99)
GLUCOSE BLD-MCNC: 536 MG/DL (ref 70–99)
GRAM STAIN RESULT: ABNORMAL
ORGANISM: ABNORMAL
PERFORMED ON: ABNORMAL
POTASSIUM SERPL-SCNC: 4 MMOL/L (ref 3.5–5.1)
SODIUM BLD-SCNC: 128 MMOL/L (ref 136–145)

## 2021-12-25 PROCEDURE — 36415 COLL VENOUS BLD VENIPUNCTURE: CPT

## 2021-12-25 PROCEDURE — 97530 THERAPEUTIC ACTIVITIES: CPT

## 2021-12-25 PROCEDURE — 99233 SBSQ HOSP IP/OBS HIGH 50: CPT | Performed by: INTERNAL MEDICINE

## 2021-12-25 PROCEDURE — 94761 N-INVAS EAR/PLS OXIMETRY MLT: CPT

## 2021-12-25 PROCEDURE — 6370000000 HC RX 637 (ALT 250 FOR IP): Performed by: INTERNAL MEDICINE

## 2021-12-25 PROCEDURE — 2580000003 HC RX 258: Performed by: NURSE PRACTITIONER

## 2021-12-25 PROCEDURE — 80048 BASIC METABOLIC PNL TOTAL CA: CPT

## 2021-12-25 PROCEDURE — 97535 SELF CARE MNGMENT TRAINING: CPT

## 2021-12-25 PROCEDURE — 2700000000 HC OXYGEN THERAPY PER DAY

## 2021-12-25 PROCEDURE — 2060000000 HC ICU INTERMEDIATE R&B

## 2021-12-25 PROCEDURE — 6370000000 HC RX 637 (ALT 250 FOR IP): Performed by: NURSE PRACTITIONER

## 2021-12-25 PROCEDURE — 6360000002 HC RX W HCPCS: Performed by: NURSE PRACTITIONER

## 2021-12-25 PROCEDURE — 97166 OT EVAL MOD COMPLEX 45 MIN: CPT

## 2021-12-25 PROCEDURE — 97162 PT EVAL MOD COMPLEX 30 MIN: CPT

## 2021-12-25 PROCEDURE — 99232 SBSQ HOSP IP/OBS MODERATE 35: CPT | Performed by: INTERNAL MEDICINE

## 2021-12-25 RX ORDER — INSULIN GLARGINE 100 [IU]/ML
10 INJECTION, SOLUTION SUBCUTANEOUS DAILY
Status: DISCONTINUED | OUTPATIENT
Start: 2021-12-25 | End: 2021-12-28 | Stop reason: HOSPADM

## 2021-12-25 RX ORDER — MIDODRINE HYDROCHLORIDE 5 MG/1
5 TABLET ORAL 3 TIMES DAILY
Status: DISCONTINUED | OUTPATIENT
Start: 2021-12-25 | End: 2021-12-28 | Stop reason: HOSPADM

## 2021-12-25 RX ORDER — INSULIN GLARGINE 100 [IU]/ML
20 INJECTION, SOLUTION SUBCUTANEOUS NIGHTLY
Status: DISCONTINUED | OUTPATIENT
Start: 2021-12-25 | End: 2021-12-28 | Stop reason: HOSPADM

## 2021-12-25 RX ADMIN — INSULIN LISPRO 6 UNITS: 100 INJECTION, SOLUTION INTRAVENOUS; SUBCUTANEOUS at 21:59

## 2021-12-25 RX ADMIN — GABAPENTIN 300 MG: 300 CAPSULE ORAL at 21:55

## 2021-12-25 RX ADMIN — MORPHINE SULFATE 15 MG: 15 TABLET, FILM COATED, EXTENDED RELEASE ORAL at 09:49

## 2021-12-25 RX ADMIN — ASPIRIN 81 MG: 81 TABLET, COATED ORAL at 09:50

## 2021-12-25 RX ADMIN — TORSEMIDE 20 MG: 20 TABLET ORAL at 09:48

## 2021-12-25 RX ADMIN — GABAPENTIN 300 MG: 300 CAPSULE ORAL at 09:47

## 2021-12-25 RX ADMIN — PANTOPRAZOLE SODIUM 40 MG: 40 TABLET, DELAYED RELEASE ORAL at 06:07

## 2021-12-25 RX ADMIN — MORPHINE SULFATE 15 MG: 15 TABLET, FILM COATED, EXTENDED RELEASE ORAL at 21:55

## 2021-12-25 RX ADMIN — MIDODRINE HYDROCHLORIDE 5 MG: 5 TABLET ORAL at 12:25

## 2021-12-25 RX ADMIN — CALCIUM CARBONATE (ANTACID) CHEW TAB 500 MG 500 MG: 500 CHEW TAB at 09:47

## 2021-12-25 RX ADMIN — ROFLUMILAST 500 MCG: 500 TABLET ORAL at 09:49

## 2021-12-25 RX ADMIN — MIDODRINE HYDROCHLORIDE 5 MG: 5 TABLET ORAL at 18:01

## 2021-12-25 RX ADMIN — VANCOMYCIN HYDROCHLORIDE 750 MG: 750 INJECTION, POWDER, LYOPHILIZED, FOR SOLUTION INTRAVENOUS at 21:54

## 2021-12-25 RX ADMIN — IPRATROPIUM BROMIDE 1 SPRAY: 42 SPRAY NASAL at 21:57

## 2021-12-25 RX ADMIN — MIDODRINE HYDROCHLORIDE 2.5 MG: 5 TABLET ORAL at 09:48

## 2021-12-25 RX ADMIN — INSULIN LISPRO 12 UNITS: 100 INJECTION, SOLUTION INTRAVENOUS; SUBCUTANEOUS at 12:05

## 2021-12-25 RX ADMIN — ENOXAPARIN SODIUM 30 MG: 100 INJECTION SUBCUTANEOUS at 09:48

## 2021-12-25 RX ADMIN — INSULIN GLARGINE 10 UNITS: 100 INJECTION, SOLUTION SUBCUTANEOUS at 13:23

## 2021-12-25 RX ADMIN — INSULIN LISPRO 6 UNITS: 100 INJECTION, SOLUTION INTRAVENOUS; SUBCUTANEOUS at 17:13

## 2021-12-25 RX ADMIN — PREDNISONE 40 MG: 20 TABLET ORAL at 09:47

## 2021-12-25 RX ADMIN — CETIRIZINE HYDROCHLORIDE 10 MG: 10 TABLET ORAL at 09:48

## 2021-12-25 RX ADMIN — LATANOPROST 1 DROP: 50 SOLUTION OPHTHALMIC at 21:57

## 2021-12-25 RX ADMIN — INSULIN GLARGINE 20 UNITS: 100 INJECTION, SOLUTION SUBCUTANEOUS at 21:59

## 2021-12-25 RX ADMIN — SODIUM CHLORIDE, PRESERVATIVE FREE 10 ML: 5 INJECTION INTRAVENOUS at 09:50

## 2021-12-25 RX ADMIN — SPIRONOLACTONE 25 MG: 25 TABLET ORAL at 09:47

## 2021-12-25 RX ADMIN — CLOPIDOGREL BISULFATE 75 MG: 75 TABLET ORAL at 09:48

## 2021-12-25 RX ADMIN — DULOXETINE HYDROCHLORIDE 60 MG: 60 CAPSULE, DELAYED RELEASE ORAL at 09:47

## 2021-12-25 RX ADMIN — ATORVASTATIN CALCIUM 40 MG: 40 TABLET, FILM COATED ORAL at 21:55

## 2021-12-25 RX ADMIN — SODIUM CHLORIDE, PRESERVATIVE FREE 10 ML: 5 INJECTION INTRAVENOUS at 21:56

## 2021-12-25 RX ADMIN — CEFEPIME 2000 MG: 2 INJECTION, POWDER, FOR SOLUTION INTRAVENOUS at 15:17

## 2021-12-25 RX ADMIN — ENOXAPARIN SODIUM 30 MG: 100 INJECTION SUBCUTANEOUS at 21:56

## 2021-12-25 RX ADMIN — INSULIN LISPRO 10 UNITS: 100 INJECTION, SOLUTION INTRAVENOUS; SUBCUTANEOUS at 09:51

## 2021-12-25 RX ADMIN — CEFEPIME 2000 MG: 2 INJECTION, POWDER, FOR SOLUTION INTRAVENOUS at 03:46

## 2021-12-25 ASSESSMENT — PAIN DESCRIPTION - PROGRESSION
CLINICAL_PROGRESSION: NOT CHANGED

## 2021-12-25 ASSESSMENT — PAIN DESCRIPTION - FREQUENCY: FREQUENCY: CONTINUOUS

## 2021-12-25 ASSESSMENT — PAIN DESCRIPTION - ORIENTATION
ORIENTATION: LOWER
ORIENTATION: LOWER

## 2021-12-25 ASSESSMENT — PAIN - FUNCTIONAL ASSESSMENT: PAIN_FUNCTIONAL_ASSESSMENT: PREVENTS OR INTERFERES SOME ACTIVE ACTIVITIES AND ADLS

## 2021-12-25 ASSESSMENT — PAIN SCALES - GENERAL
PAINLEVEL_OUTOF10: 8
PAINLEVEL_OUTOF10: 4
PAINLEVEL_OUTOF10: 5

## 2021-12-25 ASSESSMENT — PAIN DESCRIPTION - PAIN TYPE
TYPE: CHRONIC PAIN
TYPE: CHRONIC PAIN

## 2021-12-25 ASSESSMENT — PAIN DESCRIPTION - ONSET: ONSET: ON-GOING

## 2021-12-25 ASSESSMENT — PAIN DESCRIPTION - DESCRIPTORS: DESCRIPTORS: ACHING;CONSTANT

## 2021-12-25 ASSESSMENT — PAIN DESCRIPTION - LOCATION
LOCATION: BACK
LOCATION: BACK

## 2021-12-25 NOTE — PROGRESS NOTES
Vancomycin Day # 6  Current dose = Changed to 750 mg q12h yesterday  BUN/SRCR 30/0.9      Est CrCl = 52 ml/min              Tmax 98.4  Vancomycin trough ordered for 12/27. Continue same until then.

## 2021-12-25 NOTE — PROGRESS NOTES
Covid  PMH:  CHF,CAD,s/p CABG , Thoracic comp fx,  Home Health S4 Level Recommendation:  Level 1 Standard  AM-PAC Mobility Score    AM-PAC Inpatient Mobility Raw Score : 19     Gris Taveras scored a 19/24 on the AM-PAC short mobility form. Current research shows that an AM-PAC score of 18 or greater is typically associated with a discharge to the patient's home setting. If patient discharges prior to next session this note will serve as a discharge summary. Please see below for the latest assessment towards goals. Preadmission Environment  Pt is from home with her daughter, Amanda Davis and plans to return.  Pt is active with [facility] [employer] (+HC orders) and wants JON[facility] for PT/OT/SN.     Pt. Magali jacob (granddaughter, 26 yo, will be going to college; daughter lives up the street)  Plans to stay with her daughter for the next 2 weeks. Daughter works from home, will be available . Information is for her daughter's home  Home environment:    two story home, pt stays on main floor, no need for steps  Steps to enter first floor: 2 steps then a platform and another step, no railings  Steps to second floor: N/A  Bathroom: daughter's-walk in shower, standard commode  (pt's home: walk in shower, comfort height toilet and grab bars, Grab bars around toilet)  Equipment owned: RW, wc (manual), quad cane, SPC, hospital bed and hemiwalker (all at pt's home)     Preadmission Status:  Pt. Able to drive: No  Pt Fully independent with ADLs: Yes but did prefer her daughter be in the house when she takes a shower. Pt. Required assistance from family for: Hilda,  Margareth Kenney  Pt. independent for transfers and gait and walked with Rolling Walker  History of falls No       Pain   Yes  Location: LB  Ratin /10  Pain Medicine Status: Received pain med prior to tx    Cognition    A&O x4   Able to follow 2 step commands    Subjective  Patient lying supine in bed with no family present. Pt agreeable to this PT eval & tx. Upper Extremity ROM/Strength  Please see OT evaluation. Lower Extremity ROM / Strength   AROM WFL: Yes  ROM limitations:     Strength Assessment (measured on a 0-5 scale): and BLE strength WFL, but not formally assessed with MMT. Lower Extremity Sensation    NT    Lower Extremity Proprioception:   NT    Coordination and Tone  WFL    Balance  Sitting:  Good ; Supervision  Comments:     Standing: Good - ; CGA  Comments: no LOB,used RW, stood at [facility] to complete pericare    Bed Mobility   Supine to Sit:    Independent  Sit to Supine:   Independent  Rolling:   Independent  Scooting in sitting: Independent  Scooting in supine:  Max A  and 2 persons    Transfer Training     Sit to stand:   CGA  Stand to sit:   CGA  Bed to BSC:   CGA with use of gait belt and rolling walker (RW)    Gait gait completed as indicated below  Distance:      3 steps to/from [facility]  Deviations (firm surface/linoleum):  decreased daja  Assistive Device Used:    gait belt and rolling walker (RW)  Level of Assist:    CGA  Comment: some lightheadedness after transfer    Stair Training deferred, pt unsafe/ not appropriate to complete stairs at this time    Activity Tolerance   Pt completed therapy session with Dizziness noted with activity,improved somewhat with pacing  Supine:  SpO2: 91%  HR: 86  BP:  91/57     EOB  /47  HR 83     Standing  BP 68/42  HR 90     After using BSC  /54  HR 98  Spo2 82, returned to 90 after ~3min,  HR 50    Positioning Needs   Pt in bed, alarm set, positioned in proper neutral alignment and pressure relief provided. Call light provided and all needs within reach    Exercises Initiated  Lacy deferred secondary to pt fatigued      Other  RN and MD aware of BP response to activity    Patient/Family Education   Pt educated on role of inpatient PT, POC, importance of continued activity, safety awareness, transfer techniques and calling for assist with mobility.     Assessment  Pt seen for Physical Therapy evaluation in acute care setting. Pt demonstrated decreased Activity tolerance and Safety as well as decreased independence with Ambulation and Transfers. Patient will benefit from skilled PT to maximize mobility, with monitoring of BP and symptoms. Recommending Home 24 hr assist and with home PT upon discharge as patient functioning would benefit from continued therapy services    Goals : To be met in 3 visits:  1). Independent with LE Ex x 10 reps    To be met in 6 visits:  1). Supine to/from sit: Independent  2). Sit to/from stand: Independent  3). Bed to chair: Independent  4). Gait: Ambulate 125 ft.   with  SBA and use of rolling walker (RW)  5). Tolerate B LE exercises 3 sets of 10-15 reps  6). Ascend/descend 2+1steps with CGA with use of no handrail and     Rehabilitation Potential: Good  Strengths for achieving goals include:   Pt motivated, PLOF, Family Support and Pt cooperative   Barriers to achieving goals include:    Weakness    Plan    To be seen 2-3 x / week  while in acute care setting for therapeutic exercises, bed mobility, transfers, progressive gait training, balance training, and family/patient education. Signature: Adriel Kovacs, PT #[id number]    If patient discharges from this facility prior to next visit, this note will serve as the Discharge Summary.

## 2021-12-25 NOTE — PROGRESS NOTES
Patient vitals are stable. Patient denies any further needs at this time. Call light within reach.  Frank Coker RN

## 2021-12-25 NOTE — PROGRESS NOTES
IM Progress Note    Admit Date:  12/20/2021    COVID-19 patient, admitted for acute on chronic hypoxic respiratory failure. She was just discharged readmitted with increased shortness of breath and hypoxia. has  HCAP    Subjective:  Ms. Lolita Gallardo is feeling  better today. Decreased cough and shortness of breath. She is hoping to go home soon. She is normally on oxygen 2 L. O2 requirements are up to 6 L on admission, slowly weaned down to 2 L today. She still desaturates easily  Patient seen by PT OT, she is still very weak. Patient was dizzy and orthostatic when they try to set her up today. Respiratory cultures growing MRSA.   she is afebrile. Objective:   BP (!) 91/57   Pulse 86   Temp 97.8 °F (36.6 °C) (Oral)   Resp 18   Ht 5' 8\" (1.727 m)   Wt 124 lb (56.2 kg)   SpO2 91%   BMI 18.85 kg/m²       Intake/Output Summary (Last 24 hours) at 12/25/2021 1107  Last data filed at 12/25/2021 0950  Gross per 24 hour   Intake 232 ml   Output 2100 ml   Net -1868 ml         Physical Exam:  Patient in droplet plus precautions  Gen: No distress. Alert. Awake and well-oriented. She looks better today  Still appears ill and fatigued  Eyes: PERRL. No sclera icterus. No conjunctival injection. ENT: No discharge. Pharynx clear. Neck: No JVD. No Carotid Bruit. Trachea midline. Resp: No accessory muscle use. Diminished breath sounds . No wheezes rales or rhonchi today  CV: Regular rate. Regular rhythm. No murmur. No rub. No edema. Capillary Refill: Brisk,< 3 seconds   Peripheral Pulses: +2 palpable, equal bilaterally   GI: Non-tender. Non-distended. No masses. No organomegaly. Normal bowel sounds. No hernia. Skin: Warm and dry. No nodule on exposed extremities. No rash on exposed extremities. M/S: No cyanosis. No joint deformity. No clubbing. Neuro: Awake. Grossly nonfocal    Psych: Oriented x 3. No anxiety or agitation.         Medications:   Scheduled Meds:   insulin glargine  20 Units SubCUTAneous Nightly    midodrine  5 mg Oral TID    potassium chloride  20 mEq Oral Daily with breakfast    vancomycin  750 mg IntraVENous Q24H    predniSONE  40 mg Oral Daily    cefepime  2,000 mg IntraVENous Q12H    aspirin EC  81 mg Oral Daily    atorvastatin  40 mg Oral Nightly    calcium carbonate  500 mg Oral Daily    cetirizine  10 mg Oral Daily    clopidogrel  75 mg Oral Daily    Roflumilast  500 mcg Oral Daily    DULoxetine  60 mg Oral Daily    fluticasone  1 spray Each Nostril Daily    gabapentin  300 mg Oral BID    ipratropium  1 spray Each Nostril BID    latanoprost  1 drop Both Eyes Nightly    metoprolol succinate  12.5 mg Oral Daily    morphine  15 mg Oral BID    pantoprazole  40 mg Oral QAM AC    spironolactone  25 mg Oral Daily    sodium chloride flush  5-40 mL IntraVENous 2 times per day    enoxaparin  30 mg SubCUTAneous BID    insulin lispro  0-12 Units SubCUTAneous TID WC    insulin lispro  0-6 Units SubCUTAneous Nightly       Continuous Infusions:   dextrose      sodium chloride 25 mL (12/23/21 0331)       Data:  CBC:   No results for input(s): WBC, RBC, HGB, HCT, MCV, RDW, PLT in the last 72 hours. BMP:   Recent Labs     12/23/21  0429 12/24/21  0432 12/25/21  0459   * 130* 128*   K 3.6 3.2* 4.0   CL 87* 89* 89*   CO2 32 31 30   BUN 25* 28* 30*   CREATININE 0.9 1.0 0.9     BNP: No results for input(s): BNP in the last 72 hours. PT/INR:   No results for input(s): PROTIME, INR in the last 72 hours. APTT:   No results for input(s): APTT in the last 72 hours. CARDIAC ENZYMES:   No results for input(s): CKMB, CKMBINDEX, TROPONINI in the last 72 hours. Invalid input(s): CKTOTAL;3  FASTING LIPID PANEL:  Lab Results   Component Value Date    CHOL 112 10/29/2021    HDL 34 (L) 10/29/2021    TRIG 85 10/29/2021     LIVER PROFILE:   No results for input(s): AST, ALT, ALB, BILIDIR, BILITOT, ALKPHOS in the last 72 hours.      CULTURES  Blood: No growth to date   Sputum : Positive for MRSA     EKG:   Sinus rhythm with occasional Premature ventricular complexes  Possible Left atrial enlargement  Non-specific intra-ventricular conduction delay  Nonspecific ST and T wave abnormality  Abnormal ECG  When compared with ECG of 13-DEC-2021 14:26,  No significant change was found  Confirmed by Mckay Kaplan MD, [facility]     RADIOLOGY  XR CHEST PORTABLE   Final Result   Increased patchy opacities in the right lung which may reflect airspace   disease superimposed on background emphysematous changes. Assessment:  Active Problems:    Acute on chronic respiratory failure with hypoxia (HCC)    COVID    Pneumonia due to infectious organism    Hypokalemia  Resolved Problems:    * No resolved hospital problems. *      Plan:     #Acute hypoxic respiratory failure  - 2/2 PNA, COPD, and COVID 19  - patient dc'd after recent admit on 2L  -hypoxic on presentation , with O2 requirement up to 6 L . O2 was weaned down 5 L -> 3L-> 2L today   Continue to monitor O2 sats closely. #Pneumonia   - Health care acquired: Due to MRSA . Respiratory cultures positive for MRSA . -Procalcitonin elevated 1.3  -Continue to treat with antibiotics vanc and cefepime , day #6   likely can be switched to doxycycline on discharge  Plan DC tomorrow      #COPD with AE  - combivent   -On steroids  -Duo nebs as needed     #COVID 19  - in a vaccinated patient.  She completed 2 dose vaccination with Lakhani Peter in March 2021, has not received a booster dose.  She is immunocompromised on Enbrel and daily prednisone for RA  - symptoms of dyspnea present now for 8 days before this admission  - treated with decadron and remdesivir during most recent admit   - decadron resumed. Completed 10 days of Decadron now.    - Continue supplemental oxygen as needed  - appreciate pulmonary input   - droplet + isolation      #Weakness and debility  #Orthostatic hypotension  #Hyponatremia  -Continue PT OT  -We will increase dose of midodrine from 2.5 mg 3 times daily to 5 mg 3 times daily. Stop torsemide. .  Continue Toprol and Aldactone as at home. .  Monitor blood pressures closely. Hernan Albarado PT OT to reevaluate in a.m. Monitor BMP     #chronic diastolic CHF  - Home diuretics: Demadex 40 mg daily, spironolactone 25 mg daily  Patient is hyponatremic hypotensive and orthostatic now. Will discontinue Demadex. Continue Aldactone only. And monitor closely.     #Hypokalemia   -Repleted     #CAD post CABG and stents   #PVD sp right femoral popliteal bypass surgery   - cont ASA, plavix, statin, toprol . She is on a very low-dose of Toprol     #Mild Mitral Regurgitation   - f/b cardio     #Rheumatoid arthritis   - on Enbrel and daily prednisone at home- held      #Stage 4 sacral pressure ulcer  - f/b Dr Meme Armenta in the [address] [place] [address],[address]  - wound RN consult     #DM2  - with hyperglycemia  - cont Lantus-increased dose  - use SSI      #DANIELLE on CPAP     #Chronic pain  - cont home morphine and percocet  - cont gabapentin         DVT Prophylaxis: Lovenox  Diet: ADULT DIET; Regular; 4 carb choices (60 gm/meal)  Code Status: Full Code        She is improving. . Plan on discharging home in a.m with Ta Yoo  She currently has home O2 2 L. Her oxygen saturations are stable on 2 L. She still weak hypotensive and orthostatic,. Monitor today likely discharge in a.m.     Winston Dela Cruz MD, MD 12/25/2021 11:07 AM

## 2021-12-25 NOTE — PLAN OF CARE
Patient had visits from [facility] today. Patient video chatted with family for Kaila.

## 2021-12-25 NOTE — PROGRESS NOTES
Pulmonary Progress Note  Hospital Day: 6   CC: COVID-19 and respiratory failure in a vaccinated pt. Subjective:    Down to 2 L O2, no baseline O2 prior to COVID  Wants to go home but episode of orthostatic hypotension while working with PT. EXAM: BP (!) 100/52   Pulse 82   Temp 98.4 °F (36.9 °C) (Oral)   Resp 20   Ht 5' 8\" (1.727 m)   Wt 124 lb (56.2 kg)   SpO2 99% Comment: pt desaturated to 85% with no O2  BMI 18.85 kg/m²  on 4 L, I turned down to 3 L  General: ill appearing. Eyes: PERRL. No sclera icterus. No conjunctival injection. ENT: No discharge. Pharynx clear. Neck: Trachea midline. Normal thyroid. Resp: No accessory muscle use. No crackles. No wheezing. No rhonchi. No dullness on percussion. Markedly diminished breath sounds  CV: Regular rate. Regular rhythm. No mumur or rub. No edema. Peripheral pulses are 2+. Capillary refill is less than 3 seconds. GI: Non-tender. Non-distended. No masses. No organomegaly. Normal bowel sounds. No hernia. Skin: Warm and dry. No nodule on exposed extremities. No rash on exposed extremities. Lymph: No cervical LAD. No supraclavicular LAD. M/S: No cyanosis. No joint deformity. No clubbing. Neuro: Alert and oriented x3. Patellar reflexes are symmetric. Psych: No agitation, no anxiety, affect is full.      Scheduled Meds:   potassium chloride  20 mEq Oral Daily with breakfast    vancomycin  750 mg IntraVENous Q24H    predniSONE  40 mg Oral Daily    cefepime  2,000 mg IntraVENous Q12H    aspirin EC  81 mg Oral Daily    atorvastatin  40 mg Oral Nightly    calcium carbonate  500 mg Oral Daily    cetirizine  10 mg Oral Daily    clopidogrel  75 mg Oral Daily    Roflumilast  500 mcg Oral Daily    DULoxetine  60 mg Oral Daily    fluticasone  1 spray Each Nostril Daily    gabapentin  300 mg Oral BID    insulin glargine  15 Units SubCUTAneous Nightly    ipratropium  1 spray Each Nostril BID    latanoprost  1 drop Both Eyes Nightly    metoprolol succinate  12.5 mg Oral Daily    midodrine  2.5 mg Oral TID    morphine  15 mg Oral BID    pantoprazole  40 mg Oral QAM AC    spironolactone  25 mg Oral Daily    torsemide  20 mg Oral Daily    sodium chloride flush  5-40 mL IntraVENous 2 times per day    enoxaparin  30 mg SubCUTAneous BID    insulin lispro  0-12 Units SubCUTAneous TID WC    insulin lispro  0-6 Units SubCUTAneous Nightly     Continuous Infusions:   dextrose      sodium chloride 25 mL (12/23/21 0331)     PRN Meds:  ipratropium-albuterol, albuterol sulfate HFA, cyclobenzaprine, glucose, dextrose, glucagon (rDNA), dextrose, sodium chloride flush, sodium chloride, ondansetron **OR** ondansetron, polyethylene glycol, acetaminophen **OR** acetaminophen    Labs:  CBC:   No results for input(s): WBC, HGB, HCT, MCV, PLT in the last 72 hours. BMP:   Recent Labs     12/23/21  0429 12/24/21  0432 12/25/21  0459   * 130* 128*   K 3.6 3.2* 4.0   CL 87* 89* 89*   CO2 32 31 30   BUN 25* 28* 30*   CREATININE 0.9 1.0 0.9     Micro:  12/13/2021 SARS-CoV-2 positive  12/20/2021 BD NG  12/20/2021 Procal 1.33  12/22/2021 sputum cx rare growth MRSA  Staph aureus mrsa   ceFAZolin >16 mcg/mL Resistant   clindamycin >4 mcg/mL Resistant   erythromycin >4 mcg/mL Resistant   oxacillin >2 mcg/mL Resistant   tetracycline >8 mcg/mL Resistant   trimethoprim-sulfamethoxazole >2/38 mcg/mL Resistant   vancomycin 1 mcg/mL Sensitive      Imaging: Chest imaging was reviewed by me and showed   CTPA 12/13/2021  1. No evidence of pulmonary embolism or acute pulmonary abnormality.   2. Scarring at the right lung base     CXR 12/20/2021   Increased patchy opacities in the right lung which may reflect airspace   disease superimposed on background emphysematous changes.      ASSESSMENT:  · Acute hypoxic respiratory failure; no baseline O2 prior to COVID - sent home on 2 L O2 upon d/c 12/16/21 for COVID-19 pneumonia  · HCAP - MRSA  · Pseudohyponatremia  · COVID-19 pneumonia in a fully vaccinated individual, sx onset 12/13/21  · Lactic acidosis  · COPD with bronchiectasis   · Tracheomalacia    · RA on Enbrel, MTX, & Prednisone -held  · CHF  · CAD s/p CABG 5/3/21  · DANIELLE, mild - on CPAP  · Former smoker, 20 pack-yr hx, quit 1991     PLAN:  · COVID-19 isolation, droplet plus  · Supplemental O2 to maintain SaO2 >92%; monitor sats closely   · Prednisone taper for COPD exacerbation, initially had Decadron x10 days for Covid  · Vancomycin/Cefepime D#6 for HCAP, monitoring of Vanc levels to prevent toxicity  · On daily Azithromycin & Daliresp - plan to resume at d/c (needs new Azithro Rx)  · Inhaled bronchodilators only as needed, MDI preferred   · Prophylaxis: Lovenox 30 BID  · OOB to chair BID  · Has appt with me 1/13/21

## 2021-12-25 NOTE — PROGRESS NOTES
Inpatient Occupational Therapy  Evaluation and Treatment    Unit: PCU  Date:  12/25/2021  Patient Name:    Desire Granado  Admitting diagnosis:  Hypokalemia [E87.6]  Acute respiratory failure with hypoxia ([address] [J96.01]  Acute on chronic respiratory failure with hypoxia (HCC) [J96.21]  Pneumonia due to infectious organism, unspecified laterality, unspecified part of lung [J18.9]  COVID [U07.1]  Admit Date:  12/20/2021  Precautions/Restrictions/WB Status/ Lines/ Wounds/ Oxygen: fall risk, IV, bed/chair alarm, supplemental O2 (2L), telemetry and continuous pulse ox, orthostatic hypotension    Treatment Time:  940-1030  Treatment Number: 1     Billable Treatment Time: 40 minutes   Total Treatment Time:   30   minutes    Patient Goals for Therapy:  \" Go home \"      Discharge Recommendations: Home with 24/7 assist and home therapy  DME needs for discharge: Needs Met       Therapy recommendations for staff:   Assist of 1 with use of rolling walker (RW) for all transfers to/from BSC/chair    History of Present Illness: The patient is a 71 y.o. female with COPD, CHF, bronchiectasis, DANIELLE, COVID 19 tested + 12/13/21 and admitted to [facility] for hypoxia 2/2 COVID 19 and CHF discharged to home on 12/16 who presented to the ED with complaint of shortness of breath.    Patient states that she did well for a couple of days after discharge but then started getting increasing dyspnea.  Associated with cough and some sputum production.  No fevers.  O2 sats dropped to 79% at home.  Patient presented to the ER.  Readmitted for hypoxemia.   Required 6 L of oxygen on presentation.  Currently O2 weaned back to 2 L.  Work-up suggest possible superimposed pneumonia patient started on broad-spectrum IV antibiotics.   She is feeling better today.     She has been vaccinated for Covid  PMH:  CHF,CAD,s/p CABG 5/21, Thoracic comp fx,    Home Health S4 Level Recommendation:  Level 1 Standard   AM-PAC Score:  19    Preadmission Environment  Pt is from home with her daughter, Devora Grace and plans to return. Pt is active with [facility] (+HC orders) and wants JON[facility] for PT/OT/SN.     Pt. Peter jacob (granddaughter, 24 yo, will be going to college; daughter lives up the street)  Plans to stay with her daughter for the next 2 weeks. Daughter works from home, will be available . Information is for her daughter's home  Home environment:    two story home, pt stays on main floor, no need for steps  Steps to enter first floor: 2 steps then a platform and another step, no railings  Steps to second floor: N/A  Bathroom: daughter's-walk in shower, standard commode  (pt's home: walk in shower, comfort height toilet and grab bars, Grab bars around toilet)  Equipment owned: RW, wc (manual), quad cane, SPC, hospital bed and hemiwalker (all at pt's home)     Preadmission Status:  Pt. Able to drive: No  Pt Fully independent with ADLs: Yes but did prefer her daughter be in the house when she takes a shower. Pt. Required assistance from family for: Cleaning,  Acacia Thomas  Pt. independent for transfers and gait and walked with Rolling Walker  History of falls No    Pain  Yes  Ratin  Location:low back  Pain Medicine Status: Received pain med prior to tx      Cognition    A&O x4   Able to follow 2 step commands    Subjective  Patient lying supine in bed with no family present - no visitors present due to COVID-19 restrictions  Pt agreeable to this OT eval & tx. Upper Extremity ROM:    WFL,  pt able to perform all bed mobility, transfers, and gait without ROM limitation.     Upper Extremity Strength:    BUE strength impaired but not formally assessed w/ MMT    Upper Extremity Sensation    NT    Upper Extremity Proprioception:  NT    Coordination and Tone  WFL    Balance  Functional Sitting Balance:  WFL  Functional Standing Balance:Impaired    Bed mobility:    Supine to sit:   Modified Independent  Sit to supine:   Modified Independent  Rolling:    Not Tested  Scooting in sitting:  Independent  Scooting to head of bed: Mod A of 2    Bridging:   Not Tested    Transfers:    Sit to stand:  CGA  Stand to sit:  CGA  Bed to chair:   CGA  Standard toilet: Not Tested  Bed to BSC:  [facility]    Dressing:      UE:   Not Tested  LE:    Min A    Bathing:    UE:  Not Tested  LE:  Not Tested    Eating:   Not Tested    Toileting:  Min A    Activity Tolerance   Pt completed therapy session with Dizziness noted with position change  Supine:  SpO2: 91%  HR: 86  BP:  91/57    EOB:  /47  HR 83    Standing:  BP 68/42  HR 90    After using BSC  /54  HR 98  Spo2: 82% on 2L, returned to 90 after ~3min,  HR 50    Positioning Needs: In bed, call light and needs in reach. Exercise / Activities Initiated:   N/A    Patient/Family Education:   Role of OT  Recommendations for DC  Safe RW use/hand placement    Assessment of Deficits: Pt seen for Occupational therapy evaluation in acute care setting. Pt demonstrated decreased Activity tolerance, ADLs, IADLs, Balance , Strength and Transfers. Pt functioning below baseline and will likely benefit from skilled occupational therapy services to maximize safety and independence. Goal(s) : To be met in 3 Visits:  1). Bed to toilet/BSC: Supervision  With RW  To be met in 5 Visits:  1). Pt to demonstrate UE exs x 15 reps with minimal cues  2). Upper Body Bathing:   Independent  3). Lower Body Bathing:   SBA  4). Upper Body Dressing:  Independent  5). Lower Body Dressing:  SBA     Rehabilitation Potential:  Good for goals listed above. Strengths for achieving goals include: Pt motivated, PLOF, Family Support and Pt cooperative  Barriers to achieving goals include:  Complexity of condition     Plan: To be seen 3-5 x/wk while in acute care setting for therapeutic exercises, bed mobility, transfers, dressing, bathing, family/patient education, ADL/IADL retraining, energy conservation training.      Ray Mendoza [address], OTR/L  #[id number]            If patient discharges from this facility prior to next visit, this note will serve as the Discharge Summary

## 2021-12-25 NOTE — PROGRESS NOTES
Patient O2 deSAT to mid 80's. Patient assessed and AM medications given. Patient is requesting discharge. Patient denies any further needs at this time. Call light within reach.

## 2021-12-25 NOTE — PLAN OF CARE
Problem: Airway Clearance - Ineffective  Goal: Achieve or maintain patent airway  Outcome: Met This Shift     Problem: Gas Exchange - Impaired  Goal: Absence of hypoxia  Outcome: Met This Shift  Goal: Promote optimal lung function  Outcome: Met This Shift     Problem: Breathing Pattern - Ineffective  Goal: Ability to achieve and maintain a regular respiratory rate  Outcome: Met This Shift     Problem:  Body Temperature -  Risk of, Imbalanced  Goal: Ability to maintain a body temperature within defined limits  Outcome: Met This Shift  Goal: Will regain or maintain usual level of consciousness  Outcome: Met This Shift  Goal: Complications related to the disease process, condition or treatment will be avoided or minimized  Outcome: Met This Shift     Problem: Isolation Precautions - Risk of Spread of Infection  Goal: Prevent transmission of infection  Outcome: Met This Shift     Problem: Nutrition Deficits  Goal: Optimize nutritional status  Outcome: Met This Shift     Problem: Risk for Fluid Volume Deficit  Goal: Maintain normal heart rhythm  Outcome: Met This Shift  Goal: Maintain absence of muscle cramping  Outcome: Met This Shift  Goal: Maintain normal serum potassium, sodium, calcium, phosphorus, and pH  Outcome: Met This Shift     Problem: Loneliness or Risk for Loneliness  Goal: Demonstrate positive use of time alone when socialization is not possible  Outcome: Met This Shift     Problem: Fatigue  Goal: Verbalize increase energy and improved vitality  Outcome: Met This Shift     Problem: Patient Education: Go to Patient Education Activity  Goal: Patient/Family Education  Outcome: Met This Shift     Problem: Falls - Risk of:  Goal: Will remain free from falls  Description: Will remain free from falls  Outcome: Met This Shift  Goal: Absence of physical injury  Description: Absence of physical injury  Outcome: Met This Shift     Problem: Skin Integrity:  Goal: Will show no infection signs and symptoms  Description: Will show no infection signs and symptoms  Outcome: Met This Shift  Goal: Absence of new skin breakdown  Description: Absence of new skin breakdown  Outcome: Met This Shift     Problem: Pain:  Goal: Pain level will decrease  Description: Pain level will decrease  Outcome: Met This Shift  Goal: Control of acute pain  Description: Control of acute pain  Outcome: Met This Shift  Goal: Control of chronic pain  Description: Control of chronic pain  Outcome: Met This Shift

## 2021-12-25 NOTE — PROGRESS NOTES
Patient wanted dressing change done before bed. Patient was up to chair. Patient would like to remain in chair \"for a while longer. \"  Patient denies any further needs at this time. Call light within reach.  Snehal Arauz RN

## 2021-12-26 LAB
ANION GAP SERPL CALCULATED.3IONS-SCNC: 6 MMOL/L (ref 3–16)
BUN BLDV-MCNC: 34 MG/DL (ref 7–20)
CALCIUM SERPL-MCNC: 8.5 MG/DL (ref 8.3–10.6)
CHLORIDE BLD-SCNC: 86 MMOL/L (ref 99–110)
CO2: 32 MMOL/L (ref 21–32)
CREAT SERPL-MCNC: 1 MG/DL (ref 0.6–1.2)
GFR AFRICAN AMERICAN: >60
GFR NON-AFRICAN AMERICAN: 55
GLUCOSE BLD-MCNC: 256 MG/DL (ref 70–99)
GLUCOSE BLD-MCNC: 316 MG/DL (ref 70–99)
GLUCOSE BLD-MCNC: 343 MG/DL (ref 70–99)
GLUCOSE BLD-MCNC: 344 MG/DL (ref 70–99)
GLUCOSE BLD-MCNC: 359 MG/DL (ref 70–99)
PERFORMED ON: ABNORMAL
POTASSIUM SERPL-SCNC: 4 MMOL/L (ref 3.5–5.1)
SODIUM BLD-SCNC: 124 MMOL/L (ref 136–145)

## 2021-12-26 PROCEDURE — 94761 N-INVAS EAR/PLS OXIMETRY MLT: CPT

## 2021-12-26 PROCEDURE — 36415 COLL VENOUS BLD VENIPUNCTURE: CPT

## 2021-12-26 PROCEDURE — 2700000000 HC OXYGEN THERAPY PER DAY

## 2021-12-26 PROCEDURE — 2060000000 HC ICU INTERMEDIATE R&B

## 2021-12-26 PROCEDURE — 6370000000 HC RX 637 (ALT 250 FOR IP): Performed by: NURSE PRACTITIONER

## 2021-12-26 PROCEDURE — 99232 SBSQ HOSP IP/OBS MODERATE 35: CPT | Performed by: INTERNAL MEDICINE

## 2021-12-26 PROCEDURE — 99233 SBSQ HOSP IP/OBS HIGH 50: CPT | Performed by: INTERNAL MEDICINE

## 2021-12-26 PROCEDURE — 2580000003 HC RX 258: Performed by: NURSE PRACTITIONER

## 2021-12-26 PROCEDURE — 6370000000 HC RX 637 (ALT 250 FOR IP): Performed by: INTERNAL MEDICINE

## 2021-12-26 PROCEDURE — 80048 BASIC METABOLIC PNL TOTAL CA: CPT

## 2021-12-26 PROCEDURE — 6360000002 HC RX W HCPCS: Performed by: NURSE PRACTITIONER

## 2021-12-26 RX ADMIN — INSULIN LISPRO 8 UNITS: 100 INJECTION, SOLUTION INTRAVENOUS; SUBCUTANEOUS at 09:22

## 2021-12-26 RX ADMIN — INSULIN LISPRO 5 UNITS: 100 INJECTION, SOLUTION INTRAVENOUS; SUBCUTANEOUS at 21:52

## 2021-12-26 RX ADMIN — CALCIUM CARBONATE (ANTACID) CHEW TAB 500 MG 500 MG: 500 CHEW TAB at 09:15

## 2021-12-26 RX ADMIN — SODIUM CHLORIDE, PRESERVATIVE FREE 10 ML: 5 INJECTION INTRAVENOUS at 21:54

## 2021-12-26 RX ADMIN — ROFLUMILAST 500 MCG: 500 TABLET ORAL at 09:17

## 2021-12-26 RX ADMIN — MORPHINE SULFATE 15 MG: 15 TABLET, FILM COATED, EXTENDED RELEASE ORAL at 20:40

## 2021-12-26 RX ADMIN — CETIRIZINE HYDROCHLORIDE 10 MG: 10 TABLET ORAL at 09:14

## 2021-12-26 RX ADMIN — INSULIN GLARGINE 20 UNITS: 100 INJECTION, SOLUTION SUBCUTANEOUS at 21:52

## 2021-12-26 RX ADMIN — CEFEPIME 2000 MG: 2 INJECTION, POWDER, FOR SOLUTION INTRAVENOUS at 15:10

## 2021-12-26 RX ADMIN — ATORVASTATIN CALCIUM 40 MG: 40 TABLET, FILM COATED ORAL at 20:40

## 2021-12-26 RX ADMIN — CLOPIDOGREL BISULFATE 75 MG: 75 TABLET ORAL at 09:17

## 2021-12-26 RX ADMIN — GABAPENTIN 300 MG: 300 CAPSULE ORAL at 09:16

## 2021-12-26 RX ADMIN — METOPROLOL SUCCINATE 12.5 MG: 25 TABLET, EXTENDED RELEASE ORAL at 09:16

## 2021-12-26 RX ADMIN — SODIUM CHLORIDE 25 ML: 9 INJECTION, SOLUTION INTRAVENOUS at 20:37

## 2021-12-26 RX ADMIN — SPIRONOLACTONE 25 MG: 25 TABLET ORAL at 09:15

## 2021-12-26 RX ADMIN — INSULIN GLARGINE 10 UNITS: 100 INJECTION, SOLUTION SUBCUTANEOUS at 10:48

## 2021-12-26 RX ADMIN — SODIUM CHLORIDE, PRESERVATIVE FREE 10 ML: 5 INJECTION INTRAVENOUS at 09:18

## 2021-12-26 RX ADMIN — PREDNISONE 40 MG: 20 TABLET ORAL at 09:16

## 2021-12-26 RX ADMIN — LATANOPROST 1 DROP: 50 SOLUTION OPHTHALMIC at 20:40

## 2021-12-26 RX ADMIN — ENOXAPARIN SODIUM 30 MG: 100 INJECTION SUBCUTANEOUS at 20:40

## 2021-12-26 RX ADMIN — MIDODRINE HYDROCHLORIDE 5 MG: 5 TABLET ORAL at 09:18

## 2021-12-26 RX ADMIN — IPRATROPIUM BROMIDE 1 SPRAY: 42 SPRAY NASAL at 20:41

## 2021-12-26 RX ADMIN — MORPHINE SULFATE 15 MG: 15 TABLET, FILM COATED, EXTENDED RELEASE ORAL at 09:15

## 2021-12-26 RX ADMIN — FLUTICASONE PROPIONATE 1 SPRAY: 50 SPRAY, METERED NASAL at 09:19

## 2021-12-26 RX ADMIN — ENOXAPARIN SODIUM 30 MG: 100 INJECTION SUBCUTANEOUS at 09:15

## 2021-12-26 RX ADMIN — ASPIRIN 81 MG: 81 TABLET, COATED ORAL at 09:16

## 2021-12-26 RX ADMIN — VANCOMYCIN HYDROCHLORIDE 750 MG: 750 INJECTION, POWDER, LYOPHILIZED, FOR SOLUTION INTRAVENOUS at 20:38

## 2021-12-26 RX ADMIN — INSULIN LISPRO 6 UNITS: 100 INJECTION, SOLUTION INTRAVENOUS; SUBCUTANEOUS at 17:02

## 2021-12-26 RX ADMIN — IPRATROPIUM BROMIDE 1 SPRAY: 42 SPRAY NASAL at 09:20

## 2021-12-26 RX ADMIN — PANTOPRAZOLE SODIUM 40 MG: 40 TABLET, DELAYED RELEASE ORAL at 06:11

## 2021-12-26 RX ADMIN — MIDODRINE HYDROCHLORIDE 5 MG: 5 TABLET ORAL at 12:30

## 2021-12-26 RX ADMIN — CEFEPIME 2000 MG: 2 INJECTION, POWDER, FOR SOLUTION INTRAVENOUS at 03:08

## 2021-12-26 RX ADMIN — GABAPENTIN 300 MG: 300 CAPSULE ORAL at 20:40

## 2021-12-26 RX ADMIN — MIDODRINE HYDROCHLORIDE 5 MG: 5 TABLET ORAL at 17:28

## 2021-12-26 RX ADMIN — DULOXETINE HYDROCHLORIDE 60 MG: 60 CAPSULE, DELAYED RELEASE ORAL at 09:18

## 2021-12-26 RX ADMIN — INSULIN LISPRO 8 UNITS: 100 INJECTION, SOLUTION INTRAVENOUS; SUBCUTANEOUS at 12:31

## 2021-12-26 ASSESSMENT — PAIN SCALES - GENERAL
PAINLEVEL_OUTOF10: 7
PAINLEVEL_OUTOF10: 0
PAINLEVEL_OUTOF10: 7

## 2021-12-26 NOTE — PROGRESS NOTES
Pulmonary Progress Note  Hospital Day: 7   CC: COVID-19 and respiratory failure in a vaccinated pt. Subjective:    Down to RA, no baseline O2 prior to COVID; clearly feeling better overall    EXAM: BP (!) 97/48   Pulse 75   Temp 97.5 °F (36.4 °C) (Oral)   Resp 18   Ht 5' 8\" (1.727 m)   Wt 125 lb 14.4 oz (57.1 kg)   SpO2 96%   BMI 19.14 kg/m²  on RA  General: ill appearing. Eyes: PERRL. No sclera icterus. No conjunctival injection. ENT: No discharge. Pharynx clear. Neck: Trachea midline. Normal thyroid. Resp: No accessory muscle use. No crackles. No wheezing. No rhonchi. No dullness on percussion. Markedly diminished breath sounds  CV: Regular rate. Regular rhythm. No mumur or rub.  +edema. GI: Non-tender. Non-distended. No masses. No organomegaly. Normal bowel sounds. No hernia. Skin: Warm and dry. No nodule on exposed extremities. No rash on exposed extremities. Lymph: No cervical LAD. No supraclavicular LAD. M/S: No cyanosis. No joint deformity. No clubbing. Neuro: Alert and oriented x3. Patellar reflexes are symmetric. Psych: No agitation, no anxiety, affect is full.      Scheduled Meds:   insulin glargine  20 Units SubCUTAneous Nightly    midodrine  5 mg Oral TID    insulin glargine  10 Units SubCUTAneous Daily    potassium chloride  20 mEq Oral Daily with breakfast    vancomycin  750 mg IntraVENous Q24H    predniSONE  40 mg Oral Daily    cefepime  2,000 mg IntraVENous Q12H    aspirin EC  81 mg Oral Daily    atorvastatin  40 mg Oral Nightly    calcium carbonate  500 mg Oral Daily    cetirizine  10 mg Oral Daily    clopidogrel  75 mg Oral Daily    Roflumilast  500 mcg Oral Daily    DULoxetine  60 mg Oral Daily    fluticasone  1 spray Each Nostril Daily    gabapentin  300 mg Oral BID    ipratropium  1 spray Each Nostril BID    latanoprost  1 drop Both Eyes Nightly    metoprolol succinate  12.5 mg Oral Daily    morphine  15 mg Oral BID    pantoprazole  40 mg Oral QAM AC    spironolactone  25 mg Oral Daily    sodium chloride flush  5-40 mL IntraVENous 2 times per day    enoxaparin  30 mg SubCUTAneous BID    insulin lispro  0-12 Units SubCUTAneous TID WC    insulin lispro  0-6 Units SubCUTAneous Nightly     Continuous Infusions:   dextrose      sodium chloride 25 mL (12/23/21 0331)     PRN Meds:  ipratropium-albuterol, albuterol sulfate HFA, cyclobenzaprine, glucose, dextrose, glucagon (rDNA), dextrose, sodium chloride flush, sodium chloride, ondansetron **OR** ondansetron, polyethylene glycol, acetaminophen **OR** acetaminophen    Labs:  CBC:   No results for input(s): WBC, HGB, HCT, MCV, PLT in the last 72 hours. BMP:   Recent Labs     12/24/21  0432 12/25/21  0459 12/26/21  0504   * 128* 124*   K 3.2* 4.0 4.0   CL 89* 89* 86*   CO2 31 30 32   BUN 28* 30* 34*   CREATININE 1.0 0.9 1.0     Micro:  12/13/2021 SARS-CoV-2 positive  12/20/2021 BD NG  12/20/2021 Procal 1.33  12/22/2021 sputum cx rare growth MRSA  Staph aureus mrsa   ceFAZolin >16 mcg/mL Resistant   clindamycin >4 mcg/mL Resistant   erythromycin >4 mcg/mL Resistant   oxacillin >2 mcg/mL Resistant   tetracycline >8 mcg/mL Resistant   trimethoprim-sulfamethoxazole >2/38 mcg/mL Resistant   vancomycin 1 mcg/mL Sensitive      Imaging: Chest imaging was reviewed by me and showed   CTPA 12/13/2021  1. No evidence of pulmonary embolism or acute pulmonary abnormality.   2. Scarring at the right lung base     CXR 12/20/2021   Increased patchy opacities in the right lung which may reflect airspace   disease superimposed on background emphysematous changes.      ASSESSMENT:  · Acute hypoxic respiratory failure; no baseline O2 prior to COVID - sent home on 2 L O2 upon d/c 12/16/21 for COVID-19 pneumonia  · HCAP - MRSA  · Hyponatremia  · COVID-19 pneumonia in a fully vaccinated individual with Pfizer series March 2021; sx onset 12/13/21  · Lactic acidosis  · COPD with bronchiectasis   · Tracheomalacia    · RA on Enbrel, MTX, & Prednisone -held  · CHF  · CAD s/p CABG 5/3/21  · DANIELLE, mild - on CPAP  · Former smoker, 20 pack-yr hx, quit 1991     PLAN:  · COVID-19 isolation, droplet plus  · Supplemental O2 to maintain SaO2 >92%; monitor sats closely   · Prednisone taper for COPD exacerbation, initially had Decadron x10 days for Covid  · Vancomycin/Cefepime D#7 for HCAP, monitoring of Vanc levels to prevent toxicity  · I recommend 14 days of MRSA tx: PICC with IV Vanc (or PO Zyvox & hold Duloxetine).  CBC in AM  · On daily Azithromycin & Daliresp - plan to resume at d/c (needs new Azithro Rx)  · Inhaled bronchodilators only as needed, MDI preferred   · Prophylaxis: Lovenox 30 BID  · OOB to chair BID  · Has appt with me 1/13/21

## 2021-12-26 NOTE — PROGRESS NOTES
Patient requested a humidifier. Patient is only on 2L. Dr. Zarina Torres ok'ed. RN set up in room. Patient satisfied.  Tammy Deng, RN

## 2021-12-26 NOTE — PROGRESS NOTES
Progress Note    Admit Date:  12/20/2021    COVID-19 patient, admitted for acute on chronic hypoxic respiratory failure. She was just discharged->  readmitted with increased shortness of breath and hypoxia. has  HCAP    Subjective:  Ms. Maylin Sanchez is feeling much better today. .   Improving daily , decreased cough and shortness of breath. O2 sats have improved. Weaned down to 2 L now  She is  on home oxygen 2 L. She still desaturates easily. Patient seen by PT OT, she is still very weak. Patient was dizzy and orthostatic when they tried to stand her. Patient feels little less dizzy today, she is ambulating with a walker. Antihypertensive medications adjusted. Sodium level trending down to 124 today. Respiratory cultures growing MRSA.   she is afebrile. Objective:   /70   Pulse 94   Temp 96.9 °F (36.1 °C) (Oral)   Resp 18   Ht 5' 8\" (1.727 m)   Wt 125 lb 14.4 oz (57.1 kg)   SpO2 91% Comment: up to bedside commode  BMI 19.14 kg/m²       Intake/Output Summary (Last 24 hours) at 12/26/2021 1150  Last data filed at 12/26/2021 0904  Gross per 24 hour   Intake 240 ml   Output --   Net 240 ml         Physical Exam:  Patient in droplet plus precautions  Gen: No distress. Alert. Awake and well-oriented. She looks better today  Eyes: PERRL. No sclera icterus. No conjunctival injection. ENT: No discharge. Pharynx clear. Neck: No JVD. No Carotid Bruit. Trachea midline. Resp: No accessory muscle use. Diminished breath sounds . No wheezes rales or rhonchi today  CV: Regular rate. Regular rhythm. No murmur. No rub. No edema. Capillary Refill: Brisk,< 3 seconds   Peripheral Pulses: +2 palpable, equal bilaterally   GI: Non-tender. Non-distended. No masses. No organomegaly. Normal bowel sounds. No hernia. Skin: Warm and dry. No nodule on exposed extremities. No rash on exposed extremities. M/S: No cyanosis. No joint deformity. No clubbing. Neuro: Awake.  Grossly nonfocal    Psych: Oriented x 3. No anxiety or agitation. Medications:   Scheduled Meds:   insulin glargine  20 Units SubCUTAneous Nightly    midodrine  5 mg Oral TID    insulin glargine  10 Units SubCUTAneous Daily    potassium chloride  20 mEq Oral Daily with breakfast    vancomycin  750 mg IntraVENous Q24H    predniSONE  40 mg Oral Daily    cefepime  2,000 mg IntraVENous Q12H    aspirin EC  81 mg Oral Daily    atorvastatin  40 mg Oral Nightly    calcium carbonate  500 mg Oral Daily    cetirizine  10 mg Oral Daily    clopidogrel  75 mg Oral Daily    Roflumilast  500 mcg Oral Daily    DULoxetine  60 mg Oral Daily    fluticasone  1 spray Each Nostril Daily    gabapentin  300 mg Oral BID    ipratropium  1 spray Each Nostril BID    latanoprost  1 drop Both Eyes Nightly    metoprolol succinate  12.5 mg Oral Daily    morphine  15 mg Oral BID    pantoprazole  40 mg Oral QAM AC    spironolactone  25 mg Oral Daily    sodium chloride flush  5-40 mL IntraVENous 2 times per day    enoxaparin  30 mg SubCUTAneous BID    insulin lispro  0-12 Units SubCUTAneous TID WC    insulin lispro  0-6 Units SubCUTAneous Nightly       Continuous Infusions:   dextrose      sodium chloride 25 mL (12/23/21 0331)       Data:  CBC:   No results for input(s): WBC, RBC, HGB, HCT, MCV, RDW, PLT in the last 72 hours. BMP:   Recent Labs     12/24/21  0432 12/25/21  0459 12/26/21  0504   * 128* 124*   K 3.2* 4.0 4.0   CL 89* 89* 86*   CO2 31 30 32   BUN 28* 30* 34*   CREATININE 1.0 0.9 1.0     BNP: No results for input(s): BNP in the last 72 hours. PT/INR:   No results for input(s): PROTIME, INR in the last 72 hours. APTT:   No results for input(s): APTT in the last 72 hours. CARDIAC ENZYMES:   No results for input(s): CKMB, CKMBINDEX, TROPONINI in the last 72 hours.     Invalid input(s): CKTOTAL;3  FASTING LIPID PANEL:  Lab Results   Component Value Date    CHOL 112 10/29/2021    HDL 34 (L) 10/29/2021    TRIG 85 10/29/2021 LIVER PROFILE:   No results for input(s): AST, ALT, ALB, BILIDIR, BILITOT, ALKPHOS in the last 72 hours. CULTURES  Blood: No growth to date   Sputum : Positive for MRSA     EKG:   Sinus rhythm with occasional Premature ventricular complexes  Possible Left atrial enlargement  Non-specific intra-ventricular conduction delay  Nonspecific ST and T wave abnormality  Abnormal ECG  When compared with ECG of 13-DEC-2021 14:26,  No significant change was found  Confirmed by Ana María Santiago MD, [facility]     RADIOLOGY  XR CHEST PORTABLE   Final Result   Increased patchy opacities in the right lung which may reflect airspace   disease superimposed on background emphysematous changes. Assessment:  Active Problems:    Acute on chronic respiratory failure with hypoxia (HCC)    COVID    Pneumonia due to infectious organism    Hypokalemia  Resolved Problems:    * No resolved hospital problems. *      Plan:     #Acute hypoxic respiratory failure  - 2/2 PNA, COPD, and COVID 19  - patient dc'd after recent admit on 2L  -hypoxic on presentation , with O2 requirement up to 6 L . O2 was weaned down 5 L -> 3L-> 2L. O2 sats stable on 2 L for the last couple of days. Continue to monitor O2 sats closely. #Pneumonia   - Health care acquired: Due to MRSA . Respiratory cultures positive for MRSA . -Procalcitonin elevated 1.3  -Continue to treat with antibiotics vanc and cefepime , day #7       #COPD with AE  - combivent   -On steroids  -Duo nebs as needed     #COVID 19  - in a vaccinated patient.  She completed 2 dose vaccination with [employer] in March 2021, has not received a booster dose.  She is immunocompromised on Enbrel and daily prednisone for RA  - symptoms of dyspnea present now for 8 days before this admission  - treated with decadron and remdesivir during most recent admit   - decadron resumed. Completed 10 days of Decadron now.    - Continue supplemental oxygen as needed  - appreciate pulmonary input   - droplet + isolation     #Weakness and debility  #Orthostatic hypotension  #Hyponatremia  -Continue PT OT  -I increased dose of midodrine from 2.5 mg 3 times daily to 5 mg 3 times daily. Stop torsemide. Continue Toprol and Aldactone as at home. Monitor blood pressures closely. PT OT to reevaluate in a.m. Monitor BMP. Sodium level down to 124 today. Torsemide is on hold continue to monitor     #chronic diastolic CHF  - Home diuretics: Demadex 40 mg daily, spironolactone 25 mg daily  Patient is hyponatremic, hypotensive and orthostatic now. Will discontinue Demadex. Continue Aldactone only. And monitor closely.     #Hypokalemia   -Repleted     #CAD post CABG and stents   #PVD sp right femoral popliteal bypass surgery   - cont ASA, plavix, statin, toprol . She is on a very low-dose of Toprol     #Mild Mitral Regurgitation   - f/b cardio     #Rheumatoid arthritis   - on Enbrel and daily prednisone at home- held      #Stage 4 sacral pressure ulcer  - f/b Dr Fela Coulter in the [facility]  - wound RN consult     #DM2  - with hyperglycemia  - cont Lantus-increased dose  - use SSI      #DANIELLE on CPAP     #Chronic pain  - cont home morphine and percocet  - cont gabapentin         DVT Prophylaxis: Lovenox  Diet: ADULT DIET; Regular; 4 carb choices (60 gm/meal)  Code Status: Full Code        She is improving. Plan on discharging home in a.m with [facility]  She currently has home O2 2 L. Her oxygen saturations are stable on 2 L. She still weak hypotensive and orthostatic. Sodium level low at 124. Repeat BMP in a.m.. monitor today likely discharge in a.m.     Diana Hernandez MD, MD 12/26/2021 11:50 AM

## 2021-12-26 NOTE — PROGRESS NOTES
Patient stable. BP low. Patient was napping before vitals taken. Patient reports no dizziness currently. Patient denies any further needs at this time. Call light within reach.   Edmundo Mckeon RN

## 2021-12-26 NOTE — PROGRESS NOTES
Vancomycin Day # 7  Current dose = 750 mg q24h  BUN/SRCR 34/1      Est CrCl = 47 ml/min  WBC               Tmax 97.8  Vancomycin level due tomorrow @ 2030. Continue same until then.

## 2021-12-26 NOTE — PLAN OF CARE
Problem: Airway Clearance - Ineffective  Goal: Achieve or maintain patent airway  Outcome: Met This Shift     Problem: Gas Exchange - Impaired  Goal: Absence of hypoxia  Outcome: Met This Shift  Goal: Promote optimal lung function  Outcome: Met This Shift     Problem: Breathing Pattern - Ineffective  Goal: Ability to achieve and maintain a regular respiratory rate  Outcome: Met This Shift     Problem:  Body Temperature -  Risk of, Imbalanced  Goal: Ability to maintain a body temperature within defined limits  Outcome: Met This Shift  Goal: Will regain or maintain usual level of consciousness  Outcome: Met This Shift  Goal: Complications related to the disease process, condition or treatment will be avoided or minimized  Outcome: Met This Shift     Problem: Isolation Precautions - Risk of Spread of Infection  Goal: Prevent transmission of infection  Outcome: Met This Shift     Problem: Nutrition Deficits  Goal: Optimize nutritional status  Outcome: Met This Shift     Problem: Risk for Fluid Volume Deficit  Goal: Maintain normal heart rhythm  Outcome: Met This Shift  Goal: Maintain absence of muscle cramping  Outcome: Met This Shift  Goal: Maintain normal serum potassium, sodium, calcium, phosphorus, and pH  Outcome: Met This Shift     Problem: Loneliness or Risk for Loneliness  Goal: Demonstrate positive use of time alone when socialization is not possible  12/26/2021 0344 by Brisa Doss RN  Outcome: Met This Shift  12/25/2021 1533 by Jovi Mendenhall RN  Outcome: Ongoing     Problem: Fatigue  Goal: Verbalize increase energy and improved vitality  12/26/2021 0344 by Brisa Doss RN  Outcome: Met This Shift  12/25/2021 1533 by Jovi Mendenhall RN  Outcome: Ongoing     Problem: Patient Education: Go to Patient Education Activity  Goal: Patient/Family Education  12/26/2021 0344 by Brisa Doss RN  Outcome: Met This Shift  12/25/2021 1533 by Jovi Mendenhall RN  Outcome: Ongoing     Problem: Falls - Risk of:  Goal:

## 2021-12-26 NOTE — PROGRESS NOTES
Patient assessed and AM medication given. Patient on Room Air for assessment. Patient transferred to bedside commode O2 down to 83%. 2L O2 to recover. Patient denies any further needs at this time. Call light within reach.  Amanda Cha RN

## 2021-12-27 LAB
ANION GAP SERPL CALCULATED.3IONS-SCNC: 8 MMOL/L (ref 3–16)
BASOPHILS ABSOLUTE: 0 K/UL (ref 0–0.2)
BASOPHILS RELATIVE PERCENT: 0.1 %
BUN BLDV-MCNC: 35 MG/DL (ref 7–20)
CALCIUM SERPL-MCNC: 8.8 MG/DL (ref 8.3–10.6)
CHLORIDE BLD-SCNC: 88 MMOL/L (ref 99–110)
CO2: 30 MMOL/L (ref 21–32)
CREAT SERPL-MCNC: 1 MG/DL (ref 0.6–1.2)
EOSINOPHILS ABSOLUTE: 0 K/UL (ref 0–0.6)
EOSINOPHILS RELATIVE PERCENT: 0.2 %
GFR AFRICAN AMERICAN: >60
GFR NON-AFRICAN AMERICAN: 55
GLUCOSE BLD-MCNC: 136 MG/DL (ref 70–99)
GLUCOSE BLD-MCNC: 206 MG/DL (ref 70–99)
GLUCOSE BLD-MCNC: 213 MG/DL (ref 70–99)
GLUCOSE BLD-MCNC: 288 MG/DL (ref 70–99)
GLUCOSE BLD-MCNC: 450 MG/DL (ref 70–99)
GLUCOSE BLD-MCNC: 477 MG/DL (ref 70–99)
GLUCOSE BLD-MCNC: 533 MG/DL (ref 70–99)
GLUCOSE BLD-MCNC: >600 MG/DL (ref 70–99)
HCT VFR BLD CALC: 31.8 % (ref 36–48)
HEMOGLOBIN: 10.6 G/DL (ref 12–16)
LYMPHOCYTES ABSOLUTE: 1.2 K/UL (ref 1–5.1)
LYMPHOCYTES RELATIVE PERCENT: 16 %
MCH RBC QN AUTO: 30.4 PG (ref 26–34)
MCHC RBC AUTO-ENTMCNC: 33.3 G/DL (ref 31–36)
MCV RBC AUTO: 91.2 FL (ref 80–100)
MONOCYTES ABSOLUTE: 0.1 K/UL (ref 0–1.3)
MONOCYTES RELATIVE PERCENT: 2 %
NEUTROPHILS ABSOLUTE: 6 K/UL (ref 1.7–7.7)
NEUTROPHILS RELATIVE PERCENT: 81.7 %
PDW BLD-RTO: 14.4 % (ref 12.4–15.4)
PERFORMED ON: ABNORMAL
PLATELET # BLD: 307 K/UL (ref 135–450)
PMV BLD AUTO: 8.1 FL (ref 5–10.5)
POTASSIUM SERPL-SCNC: 3.3 MMOL/L (ref 3.5–5.1)
RBC # BLD: 3.48 M/UL (ref 4–5.2)
SODIUM BLD-SCNC: 126 MMOL/L (ref 136–145)
VANCOMYCIN TROUGH: 18.6 UG/ML (ref 10–20)
WBC # BLD: 7.4 K/UL (ref 4–11)

## 2021-12-27 PROCEDURE — 99233 SBSQ HOSP IP/OBS HIGH 50: CPT | Performed by: INTERNAL MEDICINE

## 2021-12-27 PROCEDURE — 99232 SBSQ HOSP IP/OBS MODERATE 35: CPT | Performed by: INTERNAL MEDICINE

## 2021-12-27 PROCEDURE — 94761 N-INVAS EAR/PLS OXIMETRY MLT: CPT

## 2021-12-27 PROCEDURE — 6370000000 HC RX 637 (ALT 250 FOR IP): Performed by: INTERNAL MEDICINE

## 2021-12-27 PROCEDURE — 2580000003 HC RX 258: Performed by: NURSE PRACTITIONER

## 2021-12-27 PROCEDURE — 85025 COMPLETE CBC W/AUTO DIFF WBC: CPT

## 2021-12-27 PROCEDURE — 2700000000 HC OXYGEN THERAPY PER DAY

## 2021-12-27 PROCEDURE — 6370000000 HC RX 637 (ALT 250 FOR IP): Performed by: NURSE PRACTITIONER

## 2021-12-27 PROCEDURE — 2060000000 HC ICU INTERMEDIATE R&B

## 2021-12-27 PROCEDURE — 6360000002 HC RX W HCPCS: Performed by: NURSE PRACTITIONER

## 2021-12-27 PROCEDURE — 97110 THERAPEUTIC EXERCISES: CPT

## 2021-12-27 PROCEDURE — 97530 THERAPEUTIC ACTIVITIES: CPT

## 2021-12-27 PROCEDURE — 80202 ASSAY OF VANCOMYCIN: CPT

## 2021-12-27 PROCEDURE — 36415 COLL VENOUS BLD VENIPUNCTURE: CPT

## 2021-12-27 PROCEDURE — 80048 BASIC METABOLIC PNL TOTAL CA: CPT

## 2021-12-27 RX ADMIN — POTASSIUM CHLORIDE 20 MEQ: 1500 TABLET, EXTENDED RELEASE ORAL at 09:17

## 2021-12-27 RX ADMIN — GABAPENTIN 300 MG: 300 CAPSULE ORAL at 21:37

## 2021-12-27 RX ADMIN — SODIUM CHLORIDE, PRESERVATIVE FREE 10 ML: 5 INJECTION INTRAVENOUS at 12:01

## 2021-12-27 RX ADMIN — IPRATROPIUM BROMIDE 1 SPRAY: 42 SPRAY NASAL at 09:21

## 2021-12-27 RX ADMIN — MIDODRINE HYDROCHLORIDE 5 MG: 5 TABLET ORAL at 18:37

## 2021-12-27 RX ADMIN — ATORVASTATIN CALCIUM 40 MG: 40 TABLET, FILM COATED ORAL at 21:37

## 2021-12-27 RX ADMIN — CLOPIDOGREL BISULFATE 75 MG: 75 TABLET ORAL at 09:17

## 2021-12-27 RX ADMIN — ENOXAPARIN SODIUM 30 MG: 100 INJECTION SUBCUTANEOUS at 21:36

## 2021-12-27 RX ADMIN — IPRATROPIUM BROMIDE 1 SPRAY: 42 SPRAY NASAL at 22:56

## 2021-12-27 RX ADMIN — INSULIN GLARGINE 10 UNITS: 100 INJECTION, SOLUTION SUBCUTANEOUS at 09:27

## 2021-12-27 RX ADMIN — MORPHINE SULFATE 15 MG: 15 TABLET, FILM COATED, EXTENDED RELEASE ORAL at 21:37

## 2021-12-27 RX ADMIN — INSULIN LISPRO 6 UNITS: 100 INJECTION, SOLUTION INTRAVENOUS; SUBCUTANEOUS at 18:38

## 2021-12-27 RX ADMIN — MORPHINE SULFATE 15 MG: 15 TABLET, FILM COATED, EXTENDED RELEASE ORAL at 09:16

## 2021-12-27 RX ADMIN — ASPIRIN 81 MG: 81 TABLET, COATED ORAL at 09:17

## 2021-12-27 RX ADMIN — DULOXETINE HYDROCHLORIDE 60 MG: 60 CAPSULE, DELAYED RELEASE ORAL at 09:18

## 2021-12-27 RX ADMIN — ROFLUMILAST 500 MCG: 500 TABLET ORAL at 09:17

## 2021-12-27 RX ADMIN — PANTOPRAZOLE SODIUM 40 MG: 40 TABLET, DELAYED RELEASE ORAL at 06:47

## 2021-12-27 RX ADMIN — SODIUM CHLORIDE 25 ML: 9 INJECTION, SOLUTION INTRAVENOUS at 21:35

## 2021-12-27 RX ADMIN — CALCIUM CARBONATE (ANTACID) CHEW TAB 500 MG 500 MG: 500 CHEW TAB at 09:17

## 2021-12-27 RX ADMIN — SPIRONOLACTONE 25 MG: 25 TABLET ORAL at 09:17

## 2021-12-27 RX ADMIN — INSULIN HUMAN 8 UNITS: 100 INJECTION, SOLUTION PARENTERAL at 04:35

## 2021-12-27 RX ADMIN — FLUTICASONE PROPIONATE 1 SPRAY: 50 SPRAY, METERED NASAL at 09:22

## 2021-12-27 RX ADMIN — MIDODRINE HYDROCHLORIDE 5 MG: 5 TABLET ORAL at 09:17

## 2021-12-27 RX ADMIN — ENOXAPARIN SODIUM 30 MG: 100 INJECTION SUBCUTANEOUS at 09:18

## 2021-12-27 RX ADMIN — POLYETHYLENE GLYCOL (3350) 17 G: 17 POWDER, FOR SOLUTION ORAL at 15:47

## 2021-12-27 RX ADMIN — LATANOPROST 1 DROP: 50 SOLUTION OPHTHALMIC at 22:56

## 2021-12-27 RX ADMIN — VANCOMYCIN HYDROCHLORIDE 750 MG: 750 INJECTION, POWDER, LYOPHILIZED, FOR SOLUTION INTRAVENOUS at 21:36

## 2021-12-27 RX ADMIN — METOPROLOL SUCCINATE 12.5 MG: 25 TABLET, EXTENDED RELEASE ORAL at 09:17

## 2021-12-27 RX ADMIN — INSULIN GLARGINE 20 UNITS: 100 INJECTION, SOLUTION SUBCUTANEOUS at 21:39

## 2021-12-27 RX ADMIN — GABAPENTIN 300 MG: 300 CAPSULE ORAL at 09:18

## 2021-12-27 RX ADMIN — INSULIN LISPRO 4 UNITS: 100 INJECTION, SOLUTION INTRAVENOUS; SUBCUTANEOUS at 08:03

## 2021-12-27 RX ADMIN — CEFEPIME 2000 MG: 2 INJECTION, POWDER, FOR SOLUTION INTRAVENOUS at 04:35

## 2021-12-27 RX ADMIN — SODIUM CHLORIDE 25 ML: 9 INJECTION, SOLUTION INTRAVENOUS at 04:34

## 2021-12-27 RX ADMIN — PREDNISONE 40 MG: 20 TABLET ORAL at 09:17

## 2021-12-27 RX ADMIN — INSULIN LISPRO 6 UNITS: 100 INJECTION, SOLUTION INTRAVENOUS; SUBCUTANEOUS at 21:39

## 2021-12-27 RX ADMIN — CETIRIZINE HYDROCHLORIDE 10 MG: 10 TABLET ORAL at 09:17

## 2021-12-27 ASSESSMENT — PAIN SCALES - GENERAL
PAINLEVEL_OUTOF10: 7
PAINLEVEL_OUTOF10: 7

## 2021-12-27 NOTE — PLAN OF CARE
Nutrition Problem #1: Inadequate oral intake  Intervention: Food and/or Nutrient Delivery: Continue Current Diet,Start Oral Nutrition Supplement  Nutritional Goals: patient will consume 75% or greater of meals on ADULT DIET;  Regular; 4 carb choices + accept and consume 75% or greater of Ensure Enlive with meals; pt will adhere to 1500 ml daily FR

## 2021-12-27 NOTE — PROGRESS NOTES
Physical Therapy  Inpatient Physical Therapy Daily Treatment Note    Unit: PCU  Date:  12/27/2021  Patient Name:    Amrita Klein  Admitting diagnosis:  Hypokalemia [E87.6]  Acute respiratory failure with hypoxia ([address] [J96.01]  Acute on chronic respiratory failure with hypoxia (HCC) [J96.21]  Pneumonia due to infectious organism, unspecified laterality, unspecified part of lung [J18.9]  COVID [U07.1]  Admit Date:  12/20/2021  Precautions/Restrictions:  Fall risk, Bed/chair alarm, Lines -IV and Supplemental O2 (2L), Telemetry, Continuous pulse oximetry and Isolation Precautions: Droplet Plus - COVID      Discharge Recommendations: Home 24 hr assist and with home PT   DME needs for discharge: Needs Met       Therapy recommendation for EMS Transport: can transport by wheelchair    Therapy recommendations for staff:   Assist of 1 with use of rolling walker (RW) for all transfers to/from [facility]  to/from chair (watch BP)    History of Present Illness: The patient is a 71 y.o. female with COPD, CHF, bronchiectasis, DANIELLE, COVID 19 tested + 12/13/21 and admitted to [facility] for hypoxia 2/2 COVID 19 and CHF discharged to home on 12/16 who presented to the ED with complaint of shortness of breath.    Patient states that she did well for a couple of days after discharge but then started getting increasing dyspnea.  Associated with cough and some sputum production.  No fevers.  O2 sats dropped to 79% at home.  Patient presented to the ER.  Readmitted for hypoxemia.   Required 6 L of oxygen on presentation.  Currently O2 weaned back to 2 L.  Work-up suggest possible superimposed pneumonia patient started on broad-spectrum IV antibiotics.   She is feeling better today.     She has been vaccinated for Covid  PMH:  CHF,CAD,s/p CABG 5/21, Thoracic comp fx,    Home Health S4 Level Recommendation: Level 1 Standard  AM-PAC Mobility Score   AM-PAC Inpatient Mobility Raw Score : 19       Treatment Time:  4895-4272  Treatment number: 2  Timed

## 2021-12-27 NOTE — PROGRESS NOTES
Vancomycin Day: 8    Patient's labs, cultures, vitals, and vancomycin regimen reviewed. No changes today.   Level tonight at 2030  [facility] 12/27/202112:18 PM  .

## 2021-12-27 NOTE — PROGRESS NOTES
Comprehensive Nutrition Assessment    Type and Reason for Visit:  Initial,RD Nutrition Re-Screen/LOS (LOS x 7)    Nutrition Recommendations/Plan:   1. Continue ADULT DIET; Regular; 4 carb choices with 1500 ml FR  2. Add Ensure Enlive with meals  3. Monitor appetite, meal intake and acceptance/intake of ONS  4. Monitor wound healing, respiratory status/function, weight trends, bowel function and nutrition related labs. Nutrition Assessment:  patient was nutritionally compromised upon admission AEB readmit (@ [facility] 12/13-12/16 for acute resp failure r/t COVID), respiratory dysfunction d/t COVID-19 virus, weight loss PTA and underweight BMI status, and pt is at risk for further compromise d/t increased nutrient needs r/t COVID-19 virus + presence of mulitple wounds, altered nutrition related labs, and hx of COPD + CHF; Will continue ADULT DIET; Regular; 4 carb choices with 1500 ml FR and add Ensure Enlive with meals    Malnutrition Assessment:  Malnutrition Status: At risk for malnutrition (Comment)    Context:  Acute Illness     Findings of the 6 clinical characteristics of malnutrition:  Energy Intake:  Mild decrease in energy intake (Comment) (po intake during admission has varied)  Weight Loss:  7 - Greater than 7.5% over 3 months (8.2% or -11.1# x 3 months PTA)     Body Fat Loss:  Unable to assess (COVID-19+)     Muscle Mass Loss:  Unable to assess (COVID-19+)    Fluid Accumulation:  No significant fluid accumulation     Strength:  Not Performed    Estimated Daily Nutrient Needs:  Energy (kcal):  4344-7388 kcals based on 33-35 kcals/kg/CBW; Weight Used for Energy Requirements:  Current     Protein (g):  84-96 g protein based on 1.5-1.7 g/kg/CBW;  Weight Used for Protein Requirements:  Current        Fluid (ml/day):  1500 ml FR; Method Used for Fluid Requirements:  Other (Comment) (FR)      Nutrition Related Findings:  noted pt is a readmit, @ [facility] 12/13-12/16 for acute resp failure d/t COVID-19 virus + was

## 2021-12-27 NOTE — FLOWSHEET NOTE
12/26/21 2015   Vital Signs   Temp 97.4 °F (36.3 °C)   Temp Source Axillary   Pulse 78   Heart Rate Source Monitor   Resp 18   /69   BP Location Right upper arm   Patient Position Right side   Level of Consciousness Alert (0)   MEWS Score 1   Patient Currently in Pain Denies   Oxygen Therapy   SpO2 96 %   Pulse Oximeter Device Mode Continuous   Pulse Oximeter Device Location Finger   O2 Device Nasal cannula   O2 Flow Rate (L/min) 2 L/min     Shift assessment complete, see flowsheets. Medications given, see MAR. Pt A&O x4. VSS. No s/s of distress. Drink provided. Pt repositioned for comfort. No further needs at this time. Bed locked in lowest position. Call light and bedside table within reach. Will continue to monitor.

## 2021-12-27 NOTE — PROGRESS NOTES
Pulmonary Progress Note  Hospital Day: 8   CC: COVID-19 and respiratory failure in a vaccinated pt. Subjective:        EXAM: /66   Pulse 72   Temp 97.8 °F (36.6 °C) (Axillary)   Resp 16   Ht 5' 8\" (1.727 m)   Wt 123 lb 14.4 oz (56.2 kg)   SpO2 92%   BMI 18.84 kg/m²  on 2 L  General: ill appearing. Eyes: PERRL. No sclera icterus. No conjunctival injection. ENT: No discharge. Pharynx clear. Neck: Trachea midline. Normal thyroid. Resp: No accessory muscle use. No crackles. No wheezing. No rhonchi. No dullness on percussion. Markedly diminished breath sounds  CV: Regular rate. Regular rhythm. No mumur or rub.  +edema. GI: Non-tender. Non-distended. No masses. No organomegaly. Normal bowel sounds. No hernia. Skin: Warm and dry. No nodule on exposed extremities. No rash on exposed extremities. Lymph: No cervical LAD. No supraclavicular LAD. M/S: No cyanosis. No joint deformity. No clubbing. Neuro: Alert and oriented x3. Patellar reflexes are symmetric. Psych: No agitation, no anxiety, affect is full.      Scheduled Meds:   insulin glargine  20 Units SubCUTAneous Nightly    midodrine  5 mg Oral TID    insulin glargine  10 Units SubCUTAneous Daily    potassium chloride  20 mEq Oral Daily with breakfast    vancomycin  750 mg IntraVENous Q24H    predniSONE  40 mg Oral Daily    cefepime  2,000 mg IntraVENous Q12H    aspirin EC  81 mg Oral Daily    atorvastatin  40 mg Oral Nightly    calcium carbonate  500 mg Oral Daily    cetirizine  10 mg Oral Daily    clopidogrel  75 mg Oral Daily    Roflumilast  500 mcg Oral Daily    DULoxetine  60 mg Oral Daily    fluticasone  1 spray Each Nostril Daily    gabapentin  300 mg Oral BID    ipratropium  1 spray Each Nostril BID    latanoprost  1 drop Both Eyes Nightly    metoprolol succinate  12.5 mg Oral Daily    morphine  15 mg Oral BID    pantoprazole  40 mg Oral QAM AC    spironolactone  25 mg Oral Daily    sodium chloride flush  5-40 mL IntraVENous 2 times per day    enoxaparin  30 mg SubCUTAneous BID    insulin lispro  0-12 Units SubCUTAneous TID WC    insulin lispro  0-6 Units SubCUTAneous Nightly     Continuous Infusions:   dextrose      sodium chloride 25 mL (12/27/21 0434)     PRN Meds:  ipratropium-albuterol, albuterol sulfate HFA, cyclobenzaprine, glucose, dextrose, glucagon (rDNA), dextrose, sodium chloride flush, sodium chloride, ondansetron **OR** ondansetron, polyethylene glycol, acetaminophen **OR** acetaminophen    Labs:  CBC:   Recent Labs     12/27/21  0456   WBC 7.4   HGB 10.6*   HCT 31.8*   MCV 91.2        BMP:   Recent Labs     12/25/21  0459 12/26/21  0504 12/27/21  0456   * 124* 126*   K 4.0 4.0 3.3*   CL 89* 86* 88*   CO2 30 32 30   BUN 30* 34* 35*   CREATININE 0.9 1.0 1.0     Micro:  12/13/2021 SARS-CoV-2 positive  12/20/2021 BD NG  12/20/2021 Procal 1.33  12/22/2021 sputum cx rare growth MRSA  Staph aureus mrsa   ceFAZolin >16 mcg/mL Resistant   clindamycin >4 mcg/mL Resistant   erythromycin >4 mcg/mL Resistant   oxacillin >2 mcg/mL Resistant   tetracycline >8 mcg/mL Resistant   trimethoprim-sulfamethoxazole >2/38 mcg/mL Resistant   vancomycin 1 mcg/mL Sensitive      Imaging: Chest imaging was reviewed by me and showed   CTPA 12/13/2021  1. No evidence of pulmonary embolism or acute pulmonary abnormality.   2. Scarring at the right lung base     CXR 12/20/2021   Increased patchy opacities in the right lung which may reflect airspace   disease superimposed on background emphysematous changes.      ASSESSMENT:  · Acute hypoxic respiratory failure; no baseline O2 prior to COVID - sent home on 2 L O2 upon d/c 12/16/21 for COVID-19 pneumonia  · HCAP - MRSA  · Hyponatremia  · COVID-19 pneumonia in a fully vaccinated individual with Pfizer series March 2021; sx onset 12/13/21  · Lactic acidosis  · COPD with bronchiectasis   · Tracheomalacia    · RA on Enbrel, MTX, & Prednisone -held  · CHF  · CAD s/p CABG 5/3/21  · DANIELLE, mild - on CPAP  · Former smoker, 20 pack-yr hx, quit 1991     PLAN:  · COVID-19 isolation, droplet plus  · Supplemental O2 to maintain SaO2 >92%; monitor sats closely   · Prednisone taper for COPD exacerbation, initially had Decadron x10 days for Covid  · Vancomycin #8 and completed Cefepime D#7   · I recommend 14 days of MRSA tx: PICC with IV Vanc (or PO Zyvox & hold Duloxetine).  CBC in AM  · On daily Azithromycin & Daliresp - plan to resume at d/c (needs new Azithro Rx)  · Inhaled bronchodilators only as needed, MDI preferred   · Prophylaxis: Lovenox 30 BID  · OOB to chair BID  · Has appt with Dr. Natalie Del Valle 1/13/21  · [id number] [address] Sandhu to d/c on home O2 when cleared by IM

## 2021-12-27 NOTE — PROGRESS NOTES
Progress Note    Admit Date:  12/20/2021    COVID-19 patient, admitted for acute on chronic hypoxic respiratory failure. She was just discharged->  readmitted with increased shortness of breath and hypoxia. has  HCAP    Subjective:  Ms. Rui Dang is feeling much better today. .   Improving daily , decreased cough and shortness of breath. O2 sats have improved. Weaned down to 2 L now  She is  on home oxygen 2 L. She still desaturates easily. Objective:   BP (!) 101/58   Pulse 79   Temp 97 °F (36.1 °C) (Axillary)   Resp 16   Ht 5' 8\" (1.727 m)   Wt 123 lb 14.4 oz (56.2 kg)   SpO2 93%   BMI 18.84 kg/m²       Intake/Output Summary (Last 24 hours) at 12/27/2021 1126  Last data filed at 12/27/2021 0856  Gross per 24 hour   Intake 3677.44 ml   Output 1100 ml   Net 2577.44 ml         Physical Exam:  Patient in droplet plus precautions  Gen: No distress. Alert. Awake and well-oriented. She looks better today  Eyes: PERRL. No sclera icterus. No conjunctival injection. ENT: No discharge. Pharynx clear. Neck: No JVD. No Carotid Bruit. Trachea midline. Resp: No accessory muscle use. Diminished breath sounds . No wheezes rales or rhonchi today  CV: Regular rate. Regular rhythm. No murmur. No rub. No edema. Capillary Refill: Brisk,< 3 seconds   Peripheral Pulses: +2 palpable, equal bilaterally   GI: Non-tender. Non-distended. No masses. No organomegaly. Normal bowel sounds. No hernia. Skin: Warm and dry. No nodule on exposed extremities. No rash on exposed extremities. M/S: No cyanosis. No joint deformity. No clubbing. Neuro: Awake. Grossly nonfocal    Psych: Oriented x 3. No anxiety or agitation.         Medications:   Scheduled Meds:   insulin glargine  20 Units SubCUTAneous Nightly    midodrine  5 mg Oral TID    insulin glargine  10 Units SubCUTAneous Daily    potassium chloride  20 mEq Oral Daily with breakfast    vancomycin  750 mg IntraVENous Q24H    predniSONE  40 mg Oral Daily  aspirin EC  81 mg Oral Daily    atorvastatin  40 mg Oral Nightly    calcium carbonate  500 mg Oral Daily    cetirizine  10 mg Oral Daily    clopidogrel  75 mg Oral Daily    Roflumilast  500 mcg Oral Daily    DULoxetine  60 mg Oral Daily    fluticasone  1 spray Each Nostril Daily    gabapentin  300 mg Oral BID    ipratropium  1 spray Each Nostril BID    latanoprost  1 drop Both Eyes Nightly    metoprolol succinate  12.5 mg Oral Daily    morphine  15 mg Oral BID    pantoprazole  40 mg Oral QAM AC    spironolactone  25 mg Oral Daily    sodium chloride flush  5-40 mL IntraVENous 2 times per day    enoxaparin  30 mg SubCUTAneous BID    insulin lispro  0-12 Units SubCUTAneous TID WC    insulin lispro  0-6 Units SubCUTAneous Nightly       Continuous Infusions:   dextrose      sodium chloride 25 mL (12/27/21 0434)       Data:  CBC:   Recent Labs     12/27/21 0456   WBC 7.4   RBC 3.48*   HGB 10.6*   HCT 31.8*   MCV 91.2   RDW 14.4        BMP:   Recent Labs     12/25/21  0459 12/26/21  0504 12/27/21 0456   * 124* 126*   K 4.0 4.0 3.3*   CL 89* 86* 88*   CO2 30 32 30   BUN 30* 34* 35*   CREATININE 0.9 1.0 1.0     BNP: No results for input(s): BNP in the last 72 hours. PT/INR:   No results for input(s): PROTIME, INR in the last 72 hours. APTT:   No results for input(s): APTT in the last 72 hours. CARDIAC ENZYMES:   No results for input(s): CKMB, CKMBINDEX, TROPONINI in the last 72 hours. Invalid input(s): CKTOTAL;3  FASTING LIPID PANEL:  Lab Results   Component Value Date    CHOL 112 10/29/2021    HDL 34 (L) 10/29/2021    TRIG 85 10/29/2021     LIVER PROFILE:   No results for input(s): AST, ALT, ALB, BILIDIR, BILITOT, ALKPHOS in the last 72 hours.      CULTURES  Blood: No growth to date   Sputum : Positive for MRSA     EKG:   Sinus rhythm with occasional Premature ventricular complexes  Possible Left atrial enlargement  Non-specific intra-ventricular conduction delay  Nonspecific ST and T wave abnormality  Abnormal ECG  When compared with ECG of 13-DEC-2021 14:26,  No significant change was found  Confirmed by Pamela Fritz MD, [facility]     RADIOLOGY  XR CHEST PORTABLE   Final Result   Increased patchy opacities in the right lung which may reflect airspace   disease superimposed on background emphysematous changes. Assessment:  Active Problems:    Acute on chronic respiratory failure with hypoxia (HCC)    COVID    Pneumonia due to infectious organism    Hypokalemia  Resolved Problems:    * No resolved hospital problems. *      Plan:     #Acute hypoxic respiratory failure  - 2/2 PNA, COPD, and COVID 19  - patient dc'd after recent admit on 2L  -hypoxic on presentation , with O2 requirement up to 6 L . O2 was weaned down 5 L -> 3L-> 2L. O2 sats stable on 2 L for the last couple of days. Continue to monitor O2 sats closely. #Pneumonia   - Health care acquired: Due to MRSA . Respiratory cultures positive for MRSA . -Procalcitonin elevated 1.3  -Continue to treat with antibiotics vanc day 8/14  cefepime - finished 7 days        #COPD with AE  - combivent   -On steroids  -Duo nebs as needed     #COVID 19  - in a vaccinated patient.  She completed 2 dose vaccination with Kar Oconnor in March 2021, has not received a booster dose.  She is immunocompromised on Enbrel and daily prednisone for RA  - symptoms of dyspnea present now for 8 days before this admission  - treated with decadron and remdesivir during most recent admit   - decadron resumed. Completed 10 days of Decadron now. - Continue supplemental oxygen as needed  - appreciate pulmonary input   - droplet + isolation     #Weakness and debility  #Orthostatic hypotension  #Hyponatremia  -Continue PT OT  -I increased dose of midodrine from 2.5 mg 3 times daily to 5 mg 3 times daily. Stop torsemide. Continue Toprol and Aldactone as at home. Monitor blood pressures closely. PT OT to reevaluate in a.m. Monitor BMP.   Sodium level down to 124--> 126  today. Torsemide is on hold continue to monitor  1500 ml FR      #chronic diastolic CHF  - Home diuretics: Demadex 40 mg daily, spironolactone 25 mg daily  Patient is hyponatremic, hypotensive and orthostatic now. Will discontinue Demadex. Continue Aldactone only. And monitor closely.     #Hypokalemia   -Repleted     #CAD post CABG and stents   #PVD sp right femoral popliteal bypass surgery   - cont ASA, plavix, statin, toprol . She is on a very low-dose of Toprol     #Mild Mitral Regurgitation   - f/b cardio     #Rheumatoid arthritis   - on Enbrel and daily prednisone at home- held      #Stage 4 sacral pressure ulcer  - f/b Dr Justin Torres in the [facility]  - wound RN consult     #DM2  - with hyperglycemia  - cont Lantus-increased dose  - use SSI      #DANIELLE on CPAP     #Chronic pain  - cont home morphine and percocet  - cont gabapentin         DVT Prophylaxis: Lovenox  Diet: ADULT DIET;  Regular; 4 carb choices (60 gm/meal)  Code Status: Full Code          Lindsay Lau MD, MD 12/27/2021 11:26 AM

## 2021-12-28 VITALS
RESPIRATION RATE: 18 BRPM | SYSTOLIC BLOOD PRESSURE: 105 MMHG | TEMPERATURE: 98 F | BODY MASS INDEX: 18.9 KG/M2 | DIASTOLIC BLOOD PRESSURE: 60 MMHG | HEIGHT: 68 IN | HEART RATE: 75 BPM | OXYGEN SATURATION: 98 % | WEIGHT: 124.7 LBS

## 2021-12-28 LAB
ANION GAP SERPL CALCULATED.3IONS-SCNC: 8 MMOL/L (ref 3–16)
BASOPHILS ABSOLUTE: 0 K/UL (ref 0–0.2)
BASOPHILS RELATIVE PERCENT: 0.1 %
BUN BLDV-MCNC: 35 MG/DL (ref 7–20)
CALCIUM SERPL-MCNC: 8.5 MG/DL (ref 8.3–10.6)
CHLORIDE BLD-SCNC: 95 MMOL/L (ref 99–110)
CO2: 29 MMOL/L (ref 21–32)
CREAT SERPL-MCNC: 0.9 MG/DL (ref 0.6–1.2)
EOSINOPHILS ABSOLUTE: 0 K/UL (ref 0–0.6)
EOSINOPHILS RELATIVE PERCENT: 0.2 %
GFR AFRICAN AMERICAN: >60
GFR NON-AFRICAN AMERICAN: >60
GLUCOSE BLD-MCNC: 262 MG/DL (ref 70–99)
GLUCOSE BLD-MCNC: 267 MG/DL (ref 70–99)
GLUCOSE BLD-MCNC: 357 MG/DL (ref 70–99)
GLUCOSE BLD-MCNC: 391 MG/DL (ref 70–99)
GLUCOSE BLD-MCNC: 494 MG/DL (ref 70–99)
HCT VFR BLD CALC: 30.1 % (ref 36–48)
HEMOGLOBIN: 10.2 G/DL (ref 12–16)
LYMPHOCYTES ABSOLUTE: 1.1 K/UL (ref 1–5.1)
LYMPHOCYTES RELATIVE PERCENT: 15.5 %
MCH RBC QN AUTO: 30.1 PG (ref 26–34)
MCHC RBC AUTO-ENTMCNC: 33.8 G/DL (ref 31–36)
MCV RBC AUTO: 89.1 FL (ref 80–100)
MONOCYTES ABSOLUTE: 0.4 K/UL (ref 0–1.3)
MONOCYTES RELATIVE PERCENT: 5.7 %
NEUTROPHILS ABSOLUTE: 5.3 K/UL (ref 1.7–7.7)
NEUTROPHILS RELATIVE PERCENT: 78.5 %
PDW BLD-RTO: 14.2 % (ref 12.4–15.4)
PERFORMED ON: ABNORMAL
PLATELET # BLD: 293 K/UL (ref 135–450)
PMV BLD AUTO: 7.7 FL (ref 5–10.5)
POTASSIUM SERPL-SCNC: 4.1 MMOL/L (ref 3.5–5.1)
RBC # BLD: 3.38 M/UL (ref 4–5.2)
SODIUM BLD-SCNC: 132 MMOL/L (ref 136–145)
WBC # BLD: 6.8 K/UL (ref 4–11)

## 2021-12-28 PROCEDURE — 2700000000 HC OXYGEN THERAPY PER DAY

## 2021-12-28 PROCEDURE — 36415 COLL VENOUS BLD VENIPUNCTURE: CPT

## 2021-12-28 PROCEDURE — 99233 SBSQ HOSP IP/OBS HIGH 50: CPT | Performed by: INTERNAL MEDICINE

## 2021-12-28 PROCEDURE — 6370000000 HC RX 637 (ALT 250 FOR IP): Performed by: NURSE PRACTITIONER

## 2021-12-28 PROCEDURE — 94761 N-INVAS EAR/PLS OXIMETRY MLT: CPT

## 2021-12-28 PROCEDURE — 6370000000 HC RX 637 (ALT 250 FOR IP): Performed by: INTERNAL MEDICINE

## 2021-12-28 PROCEDURE — 2580000003 HC RX 258: Performed by: NURSE PRACTITIONER

## 2021-12-28 PROCEDURE — 6360000002 HC RX W HCPCS: Performed by: NURSE PRACTITIONER

## 2021-12-28 PROCEDURE — 80048 BASIC METABOLIC PNL TOTAL CA: CPT

## 2021-12-28 PROCEDURE — 85025 COMPLETE CBC W/AUTO DIFF WBC: CPT

## 2021-12-28 PROCEDURE — 99239 HOSP IP/OBS DSCHRG MGMT >30: CPT | Performed by: INTERNAL MEDICINE

## 2021-12-28 RX ORDER — LINEZOLID 600 MG/1
600 TABLET, FILM COATED ORAL 2 TIMES DAILY
Qty: 12 TABLET | Refills: 0 | Status: SHIPPED | OUTPATIENT
Start: 2021-12-28 | End: 2022-01-03

## 2021-12-28 RX ORDER — AZITHROMYCIN 250 MG/1
250 TABLET, FILM COATED ORAL SEE ADMIN INSTRUCTIONS
Qty: 30 TABLET | Refills: 0 | Status: SHIPPED | OUTPATIENT
Start: 2021-12-28 | End: 2022-02-07

## 2021-12-28 RX ORDER — INSULIN GLARGINE 100 [IU]/ML
30 INJECTION, SOLUTION SUBCUTANEOUS NIGHTLY
Qty: 1 PEN | Refills: 0
Start: 2021-12-28

## 2021-12-28 RX ORDER — PREDNISONE 20 MG/1
TABLET ORAL
Qty: 9 TABLET | Refills: 0 | Status: SHIPPED | OUTPATIENT
Start: 2021-12-28 | End: 2022-03-04

## 2021-12-28 RX ORDER — MIDODRINE HYDROCHLORIDE 5 MG/1
5 TABLET ORAL 3 TIMES DAILY
Qty: 90 TABLET | Refills: 0 | Status: SHIPPED | OUTPATIENT
Start: 2021-12-28 | End: 2022-02-22 | Stop reason: DRUGHIGH

## 2021-12-28 RX ADMIN — CLOPIDOGREL BISULFATE 75 MG: 75 TABLET ORAL at 08:08

## 2021-12-28 RX ADMIN — CALCIUM CARBONATE (ANTACID) CHEW TAB 500 MG 500 MG: 500 CHEW TAB at 08:08

## 2021-12-28 RX ADMIN — MORPHINE SULFATE 15 MG: 15 TABLET, FILM COATED, EXTENDED RELEASE ORAL at 08:06

## 2021-12-28 RX ADMIN — SODIUM CHLORIDE, PRESERVATIVE FREE 10 ML: 5 INJECTION INTRAVENOUS at 08:09

## 2021-12-28 RX ADMIN — GABAPENTIN 300 MG: 300 CAPSULE ORAL at 08:08

## 2021-12-28 RX ADMIN — FLUTICASONE PROPIONATE 1 SPRAY: 50 SPRAY, METERED NASAL at 08:10

## 2021-12-28 RX ADMIN — IPRATROPIUM BROMIDE 1 SPRAY: 42 SPRAY NASAL at 08:10

## 2021-12-28 RX ADMIN — DULOXETINE HYDROCHLORIDE 60 MG: 60 CAPSULE, DELAYED RELEASE ORAL at 08:07

## 2021-12-28 RX ADMIN — INSULIN LISPRO 6 UNITS: 100 INJECTION, SOLUTION INTRAVENOUS; SUBCUTANEOUS at 09:40

## 2021-12-28 RX ADMIN — MIDODRINE HYDROCHLORIDE 5 MG: 5 TABLET ORAL at 12:34

## 2021-12-28 RX ADMIN — ENOXAPARIN SODIUM 30 MG: 100 INJECTION SUBCUTANEOUS at 08:08

## 2021-12-28 RX ADMIN — METOPROLOL SUCCINATE 12.5 MG: 25 TABLET, EXTENDED RELEASE ORAL at 08:07

## 2021-12-28 RX ADMIN — INSULIN GLARGINE 10 UNITS: 100 INJECTION, SOLUTION SUBCUTANEOUS at 09:42

## 2021-12-28 RX ADMIN — SPIRONOLACTONE 25 MG: 25 TABLET ORAL at 08:08

## 2021-12-28 RX ADMIN — FLUTICASONE PROPIONATE 1 SPRAY: 50 SPRAY, METERED NASAL at 08:16

## 2021-12-28 RX ADMIN — CETIRIZINE HYDROCHLORIDE 10 MG: 10 TABLET ORAL at 08:08

## 2021-12-28 RX ADMIN — ROFLUMILAST 500 MCG: 500 TABLET ORAL at 08:06

## 2021-12-28 RX ADMIN — POTASSIUM CHLORIDE 20 MEQ: 1500 TABLET, EXTENDED RELEASE ORAL at 08:06

## 2021-12-28 RX ADMIN — MIDODRINE HYDROCHLORIDE 5 MG: 5 TABLET ORAL at 08:08

## 2021-12-28 RX ADMIN — ASPIRIN 81 MG: 81 TABLET, COATED ORAL at 08:07

## 2021-12-28 RX ADMIN — PANTOPRAZOLE SODIUM 40 MG: 40 TABLET, DELAYED RELEASE ORAL at 06:35

## 2021-12-28 RX ADMIN — PREDNISONE 40 MG: 20 TABLET ORAL at 08:07

## 2021-12-28 ASSESSMENT — PAIN SCALES - GENERAL
PAINLEVEL_OUTOF10: 3

## 2021-12-28 NOTE — PLAN OF CARE
Problem: Airway Clearance - Ineffective  Goal: Achieve or maintain patent airway  12/27/2021 2327 by Vita Amanda RN  Outcome: Ongoing  12/27/2021 2327 by Vita Amanda RN  Outcome: Ongoing     Problem: Gas Exchange - Impaired  Goal: Absence of hypoxia  12/27/2021 2327 by Vita Amanda RN  Outcome: Ongoing  12/27/2021 2327 by Vita Amanda RN  Outcome: Ongoing  Goal: Promote optimal lung function  12/27/2021 2327 by Vita Amanda RN  Outcome: Ongoing  12/27/2021 2327 by Vita Amanda RN  Outcome: Ongoing     Problem: Breathing Pattern - Ineffective  Goal: Ability to achieve and maintain a regular respiratory rate  12/27/2021 2327 by Vita Amanda RN  Outcome: Ongoing  12/27/2021 2327 by Vita Amanda RN  Outcome: Ongoing     Problem:  Body Temperature -  Risk of, Imbalanced  Goal: Ability to maintain a body temperature within defined limits  12/27/2021 2327 by Vita Amanda RN  Outcome: Ongoing  12/27/2021 2327 by Vita Amanda RN  Outcome: Ongoing  Goal: Will regain or maintain usual level of consciousness  12/27/2021 2327 by Vita Amanda RN  Outcome: Ongoing  12/27/2021 2327 by Vita Amanda RN  Outcome: Ongoing  Goal: Complications related to the disease process, condition or treatment will be avoided or minimized  12/27/2021 2327 by Vita Amanda RN  Outcome: Ongoing  12/27/2021 2327 by Vita Amanda RN  Outcome: Ongoing     Problem: Isolation Precautions - Risk of Spread of Infection  Goal: Prevent transmission of infection  12/27/2021 2327 by Vita Amanda RN  Outcome: Ongoing  12/27/2021 2327 by Vita Amanda RN  Outcome: Ongoing     Problem: Nutrition Deficits  Goal: Optimize nutritional status  12/27/2021 2327 by Vita Amanda RN  Outcome: Ongoing  12/27/2021 2327 by Vita Amanda RN  Outcome: Ongoing     Problem: Risk for Fluid Volume Deficit  Goal: Maintain normal heart rhythm  12/27/2021 2327 by Vita Amanda RN  Outcome: Ongoing  12/27/2021 2327 by Vita Amanda RN  Outcome: Ongoing  Goal: Maintain 2327 by Jeannette Hu RN  Outcome: Ongoing  Goal: Control of acute pain  Description: Control of acute pain  12/27/2021 2327 by Jeannette Hu RN  Outcome: Ongoing  12/27/2021 2327 by Jeannette Hu RN  Outcome: Ongoing  Goal: Control of chronic pain  Description: Control of chronic pain  12/27/2021 2327 by Jeannette Hu RN  Outcome: Ongoing  12/27/2021 2327 by Jeannette Hu RN  Outcome: Ongoing     Problem: Nutrition  Goal: Optimal nutrition therapy  12/27/2021 2327 by Jeannette Hu RN  Outcome: Ongoing  12/27/2021 2327 by Jeannette Hu RN  Outcome: Ongoing  12/27/2021 1217 by Maria Esther Howe RD, LD  Outcome: Ongoing  Goal: Understanding of nutritional guidelines  12/27/2021 2327 by Jeannette Hu RN  Outcome: Ongoing  12/27/2021 2327 by Jeannette Hu RN  Outcome: Ongoing  12/27/2021 1217 by Maria Esther Howe RD, LD  Outcome: Ongoing

## 2021-12-28 NOTE — PROGRESS NOTES
mg Oral Daily    atorvastatin  40 mg Oral Nightly    calcium carbonate  500 mg Oral Daily    cetirizine  10 mg Oral Daily    clopidogrel  75 mg Oral Daily    Roflumilast  500 mcg Oral Daily    DULoxetine  60 mg Oral Daily    fluticasone  1 spray Each Nostril Daily    gabapentin  300 mg Oral BID    ipratropium  1 spray Each Nostril BID    latanoprost  1 drop Both Eyes Nightly    metoprolol succinate  12.5 mg Oral Daily    morphine  15 mg Oral BID    pantoprazole  40 mg Oral QAM AC    spironolactone  25 mg Oral Daily    sodium chloride flush  5-40 mL IntraVENous 2 times per day    enoxaparin  30 mg SubCUTAneous BID    insulin lispro  0-12 Units SubCUTAneous TID WC    insulin lispro  0-6 Units SubCUTAneous Nightly       Continuous Infusions:   dextrose      sodium chloride 25 mL (12/27/21 2135)       Data:  CBC:   Recent Labs     12/27/21  0456 12/28/21  0501   WBC 7.4 6.8   RBC 3.48* 3.38*   HGB 10.6* 10.2*   HCT 31.8* 30.1*   MCV 91.2 89.1   RDW 14.4 14.2    293     BMP:   Recent Labs     12/26/21  0504 12/27/21  0456 12/28/21  0501   * 126* 132*   K 4.0 3.3* 4.1   CL 86* 88* 95*   CO2 32 30 29   BUN 34* 35* 35*   CREATININE 1.0 1.0 0.9     BNP: No results for input(s): BNP in the last 72 hours. PT/INR:   No results for input(s): PROTIME, INR in the last 72 hours. APTT:   No results for input(s): APTT in the last 72 hours. CARDIAC ENZYMES:   No results for input(s): CKMB, CKMBINDEX, TROPONINI in the last 72 hours. Invalid input(s): CKTOTAL;3  FASTING LIPID PANEL:  Lab Results   Component Value Date    CHOL 112 10/29/2021    HDL 34 (L) 10/29/2021    TRIG 85 10/29/2021     LIVER PROFILE:   No results for input(s): AST, ALT, ALB, BILIDIR, BILITOT, ALKPHOS in the last 72 hours.      CULTURES  Blood: No growth to date   Sputum : Positive for MRSA     EKG:   Sinus rhythm with occasional Premature ventricular complexes  Possible Left atrial enlargement  Non-specific intra-ventricular conduction delay  Nonspecific ST and T wave abnormality  Abnormal ECG  When compared with ECG of 13-DEC-2021 14:26,  No significant change was found  Confirmed by James Harrison MD, [facility]     RADIOLOGY  XR CHEST PORTABLE   Final Result   Increased patchy opacities in the right lung which may reflect airspace   disease superimposed on background emphysematous changes. Assessment:  Active Problems:    Hyponatremia    Chronic diastolic congestive heart failure (HCC)    Acute on chronic respiratory failure with hypoxia (HCC)    COVID    Pneumonia due to infectious organism    Hypokalemia  Resolved Problems:    * No resolved hospital problems. *      Plan:     #Acute hypoxic respiratory failure  - 2/2 PNA, COPD, and COVID 19  - patient dc'd after recent admit on 2L  -hypoxic on presentation , with O2 requirement up to 6 L . O2 was weaned down 5 L -> 3L-> 2L. O2 sats stable on 2 L for the last couple of days. Continue to monitor O2 sats closely. #Pneumonia   - Health care acquired: Due to MRSA . Respiratory cultures positive for MRSA . -Procalcitonin elevated 1.3  -Continue to treat with antibiotics vanc day 8/14  cefepime - finished 7 days        #COPD with AE  - combivent   -On steroids  -Duo nebs as needed     #COVID 19  - in a vaccinated patient.  She completed 2 dose vaccination with Kar Oconnor in March 2021, has not received a booster dose.  She is immunocompromised on Enbrel and daily prednisone for RA  - symptoms of dyspnea present now for 8 days before this admission  - treated with decadron and remdesivir during most recent admit   - decadron resumed. Completed 10 days of Decadron now. - Continue supplemental oxygen as needed  - appreciate pulmonary input   - droplet + isolation     #Weakness and debility  #Orthostatic hypotension  #Hyponatremia  -Continue PT OT  -I increased dose of midodrine from 2.5 mg 3 times daily to 5 mg 3 times daily. Stop torsemide.    Continue Toprol and Aldactone as at home.   Monitor blood pressures closely. PT OT to reevaluate in a.m. Monitor BMP. Sodium level down to 124--> 126  today. Torsemide is on hold continue to monitor  1500 ml FR. Na is good today     #chronic diastolic CHF  - Home diuretics: Demadex 40 mg daily, spironolactone 25 mg daily  Patient is hyponatremic, hypotensive and orthostatic now. Will discontinue Demadex. Continue Aldactone only. And monitor closely.     #Hypokalemia   -Repleted     #CAD post CABG and stents   #PVD sp right femoral popliteal bypass surgery   - cont ASA, plavix, statin, toprol . She is on a very low-dose of Toprol     #Mild Mitral Regurgitation   - f/b cardio     #Rheumatoid arthritis   - on Enbrel and daily prednisone at home- held      #Stage 4 sacral pressure ulcer  - f/b Dr Bhavin Jones in the [facility]  - wound RN consult     #DM2  - with hyperglycemia  - cont Lantus-increased dose  - use SSI      #DANIELLE on CPAP     #Chronic pain  - cont home morphine and percocet  - cont gabapentin         DVT Prophylaxis: Lovenox  Diet: ADULT DIET;  Regular; 4 carb choices (60 gm/meal)  Code Status: Full Code          Marcie Nicole MD, MD 12/28/2021 10:11 AM

## 2021-12-28 NOTE — PLAN OF CARE
Problem: Airway Clearance - Ineffective  Goal: Achieve or maintain patent airway  12/28/2021 0736 by Corinna Lopez RN  Outcome: Ongoing  12/27/2021 2327 by Brittany Briceno RN  Outcome: Ongoing  12/27/2021 2327 by Brittany Briceno RN  Outcome: Ongoing     Problem: Gas Exchange - Impaired  Goal: Absence of hypoxia  12/28/2021 0736 by Corinna Lopez RN  Outcome: Ongoing  12/27/2021 2327 by Brittany Briceno RN  Outcome: Ongoing  12/27/2021 2327 by Brittany Briceno RN  Outcome: Ongoing  Goal: Promote optimal lung function  12/28/2021 0736 by Corinna Lopez RN  Outcome: Ongoing  12/27/2021 2327 by Brittany Briceno RN  Outcome: Ongoing  12/27/2021 2327 by Brittany Briceno RN  Outcome: Ongoing     Problem: Breathing Pattern - Ineffective  Goal: Ability to achieve and maintain a regular respiratory rate  12/28/2021 0736 by Corinna Lopez RN  Outcome: Ongoing  12/27/2021 2327 by Brittany Briceno RN  Outcome: Ongoing  12/27/2021 2327 by Brittany Briceno RN  Outcome: Ongoing     Problem:  Body Temperature -  Risk of, Imbalanced  Goal: Ability to maintain a body temperature within defined limits  12/28/2021 0736 by Corinna Lopez RN  Outcome: Ongoing  12/27/2021 2327 by Brittany Briceno RN  Outcome: Ongoing  12/27/2021 2327 by Brittany Briceno RN  Outcome: Ongoing  Goal: Will regain or maintain usual level of consciousness  12/28/2021 0736 by Corinna Lopez RN  Outcome: Ongoing  12/27/2021 2327 by Brittany Briceno RN  Outcome: Ongoing  12/27/2021 2327 by Brittany Briceno RN  Outcome: Ongoing  Goal: Complications related to the disease process, condition or treatment will be avoided or minimized  12/28/2021 0736 by Corinna Lopez RN  Outcome: Ongoing  12/27/2021 2327 by Brittany Briceno RN  Outcome: Ongoing  12/27/2021 2327 by Brittany Briceno RN  Outcome: Ongoing     Problem: Isolation Precautions - Risk of Spread of Infection  Goal: Prevent transmission of infection  12/28/2021 0736 by Corinna Lopez RN  Outcome: Ongoing  12/27/2021 2327 by Brittany Briceno RN  Outcome: Ongoing  12/27/2021 2327 by Dionna Toney RN  Outcome: Ongoing     Problem: Nutrition Deficits  Goal: Optimize nutritional status  12/28/2021 0736 by Que De La O RN  Outcome: Ongoing  12/27/2021 2327 by Dionna Toney RN  Outcome: Ongoing  12/27/2021 2327 by Dionna Toney RN  Outcome: Ongoing     Problem: Risk for Fluid Volume Deficit  Goal: Maintain normal heart rhythm  12/28/2021 0736 by Que De La O RN  Outcome: Ongoing  12/27/2021 2327 by Dionna Toney RN  Outcome: Ongoing  12/27/2021 2327 by Dionna Toney RN  Outcome: Ongoing  Goal: Maintain absence of muscle cramping  12/28/2021 0736 by Que De La O RN  Outcome: Ongoing  12/27/2021 2327 by Dionna Toney RN  Outcome: Ongoing  12/27/2021 2327 by Dionna Toney RN  Outcome: Ongoing  Goal: Maintain normal serum potassium, sodium, calcium, phosphorus, and pH  12/28/2021 0736 by Que De La O RN  Outcome: Ongoing  12/27/2021 2327 by Dionna Toney RN  Outcome: Ongoing  12/27/2021 2327 by Dionna Toney RN  Outcome: Ongoing     Problem: Fatigue  Goal: Verbalize increase energy and improved vitality  12/28/2021 0736 by Que De La O RN  Outcome: Ongoing  12/27/2021 2327 by Dionna Toney RN  Outcome: Ongoing  12/27/2021 2327 by Dionna Toney RN  Outcome: Ongoing     Problem: Loneliness or Risk for Loneliness  Goal: Demonstrate positive use of time alone when socialization is not possible  12/28/2021 0736 by Que De La O RN  Outcome: Ongoing  12/27/2021 2327 by Dionna Toney RN  Outcome: Ongoing  12/27/2021 2327 by Dionna Toney RN  Outcome: Ongoing     Problem: Patient Education: Go to Patient Education Activity  Goal: Patient/Family Education  12/28/2021 0736 by Que De La O RN  Outcome: Ongoing  12/27/2021 2327 by Dionna Toney RN  Outcome: Ongoing  12/27/2021 2327 by Dionna Toney RN  Outcome: Ongoing

## 2021-12-28 NOTE — PROGRESS NOTES
Pt has portable oxygen tank for transport home voice dunderstanding of need to F/U with PCP , pulmonology , and wound care

## 2021-12-28 NOTE — PROGRESS NOTES
Aid called out with a BS reading high. BS recheck was 533. Night time Lantus (20 units) and Humalog (6 units) given. BS recheck 1 hour later was 477. Perfectserve sent to Dr. Alex Rios to be made aware.

## 2021-12-28 NOTE — FLOWSHEET NOTE
12/28/21 0320   Vital Signs   Temp 98.8 °F (37.1 °C)   Temp Source Oral   Pulse 73   Heart Rate Source Monitor   Resp 18   /62   BP Location Right upper arm   Patient Position Semi fowlers   Level of Consciousness Alert (0)   MEWS Score 1   Oxygen Therapy   SpO2 92 %   Pulse Oximeter Device Mode Continuous   Pulse Oximeter Device Location Finger   O2 Device Nasal cannula   O2 Flow Rate (L/min) 2 L/min   Height and Weight   Height 5' 8\" (1.727 m)   Weight 124 lb 11.2 oz (56.6 kg)   Weight Method Bed scale   BSA (Calculated - sq m) 1.65 sq meters   BMI (Calculated) 19     Pt resting in bed with eyes closed. VSS. No s/s of distress. Drink and snack refused. Bed locked in lowest position. Call light and bedside table within reach. Will continue to monitor.

## 2021-12-28 NOTE — PLAN OF CARE
nutritional status  12/28/2021 1346 by Carson Tirado RN  Outcome: Completed  12/28/2021 0736 by Carson Tirado RN  Outcome: Ongoing     Problem: Falls - Risk of:  Goal: Will remain free from falls  Description: Will remain free from falls  12/28/2021 1346 by Carson Tirado RN  Outcome: Completed  12/28/2021 0736 by Carson Tirado RN  Outcome: Ongoing  Goal: Absence of physical injury  Description: Absence of physical injury  12/28/2021 1346 by Carson Tirado RN  Outcome: Completed  12/28/2021 0736 by Carson Tirado RN  Outcome: Ongoing     Problem: Patient Education: Go to Patient Education Activity  Goal: Patient/Family Education  12/28/2021 1346 by Carson Tirado RN  Outcome: Completed  12/28/2021 0736 by Carson Tirado RN  Outcome: Ongoing     Problem: Pain:  Goal: Pain level will decrease  Description: Pain level will decrease  12/28/2021 1346 by Carson Tirado RN  Outcome: Completed  12/28/2021 0736 by Carson Tirado RN  Outcome: Ongoing  Goal: Control of acute pain  Description: Control of acute pain  12/28/2021 1346 by Carson Tirado RN  Outcome: Completed  12/28/2021 0736 by Carson Tirado RN  Outcome: Ongoing  Goal: Control of chronic pain  Description: Control of chronic pain  12/28/2021 1346 by Carson Tirado RN  Outcome: Completed  12/28/2021 0736 by Carson Tiardo RN  Outcome: Ongoing     Problem: Nutrition  Goal: Optimal nutrition therapy  12/28/2021 1346 by Carson Tirado RN  Outcome: Completed  12/28/2021 0736 by Carson Tirado RN  Outcome: Ongoing  Goal: Understanding of nutritional guidelines  12/28/2021 1346 by Carson Tirado RN  Outcome: Completed  12/28/2021 0736 by Carson Tirado RN  Outcome: Ongoing

## 2021-12-28 NOTE — PROGRESS NOTES
This RN attempted to do ambulation trial test with patient  , pt reported dizziness when sitting up to BS requested to wait until afternoon . Pt stated \" I dont want to go home to early \"  BP 95/60 upon sitting recheck BP 98/60 when laying back in bed.  Will continue to monitor

## 2021-12-28 NOTE — PROGRESS NOTES
Pt able to transfer sefl to [facility] , reported light headed sat on side of bed , pt returned self to bed SPo2 maintained 100% with exertion Dr MENDEZ notified of pt status and request to stay one more day and blood glucose of 494

## 2021-12-28 NOTE — PROGRESS NOTES
Pulmonary Progress Note  Hospital Day: 9   CC: COVID-19 and respiratory failure in a vaccinated pt. Subjective:    Ready to go home    EXAM: /60   Pulse 75   Temp 98 °F (36.7 °C) (Oral)   Resp 18   Ht 5' 8\" (1.727 m)   Wt 124 lb 11.2 oz (56.6 kg)   SpO2 98%   BMI 18.96 kg/m²  on 2 L  General: ill appearing. Eyes: PERRL. No sclera icterus. No conjunctival injection. ENT: No discharge. Pharynx clear. Neck: Trachea midline. Normal thyroid. Resp: No accessory muscle use. No crackles. No wheezing. No rhonchi. No dullness on percussion. Markedly diminished breath sounds  CV: Regular rate. Regular rhythm. No mumur or rub.  +edema. GI: Non-tender. Non-distended. No masses. No organomegaly. Normal bowel sounds. No hernia. Skin: Warm and dry. No nodule on exposed extremities. No rash on exposed extremities. Lymph: No cervical LAD. No supraclavicular LAD. M/S: No cyanosis. No joint deformity. No clubbing. Neuro: Alert and oriented x3. Patellar reflexes are symmetric. Psych: No agitation, no anxiety, affect is full.      Scheduled Meds:   insulin glargine  20 Units SubCUTAneous Nightly    midodrine  5 mg Oral TID    insulin glargine  10 Units SubCUTAneous Daily    potassium chloride  20 mEq Oral Daily with breakfast    vancomycin  750 mg IntraVENous Q24H    predniSONE  40 mg Oral Daily    aspirin EC  81 mg Oral Daily    atorvastatin  40 mg Oral Nightly    calcium carbonate  500 mg Oral Daily    cetirizine  10 mg Oral Daily    clopidogrel  75 mg Oral Daily    Roflumilast  500 mcg Oral Daily    DULoxetine  60 mg Oral Daily    fluticasone  1 spray Each Nostril Daily    gabapentin  300 mg Oral BID    ipratropium  1 spray Each Nostril BID    latanoprost  1 drop Both Eyes Nightly    metoprolol succinate  12.5 mg Oral Daily    morphine  15 mg Oral BID    pantoprazole  40 mg Oral QAM AC    spironolactone  25 mg Oral Daily    sodium chloride flush  5-40 mL IntraVENous 2 times per day  enoxaparin  30 mg SubCUTAneous BID    insulin lispro  0-12 Units SubCUTAneous TID WC    insulin lispro  0-6 Units SubCUTAneous Nightly     Continuous Infusions:   dextrose      sodium chloride 25 mL (12/27/21 2135)     PRN Meds:  ipratropium-albuterol, albuterol sulfate HFA, cyclobenzaprine, glucose, dextrose, glucagon (rDNA), dextrose, sodium chloride flush, sodium chloride, ondansetron **OR** ondansetron, polyethylene glycol, acetaminophen **OR** acetaminophen    Labs:  CBC:   Recent Labs     12/27/21  0456 12/28/21  0501   WBC 7.4 6.8   HGB 10.6* 10.2*   HCT 31.8* 30.1*   MCV 91.2 89.1    293     BMP:   Recent Labs     12/26/21  0504 12/27/21  0456 12/28/21  0501   * 126* 132*   K 4.0 3.3* 4.1   CL 86* 88* 95*   CO2 32 30 29   BUN 34* 35* 35*   CREATININE 1.0 1.0 0.9     Micro:  12/13/2021 SARS-CoV-2 positive  12/20/2021 BD NG  12/20/2021 Procal 1.33  12/22/2021 sputum cx rare growth MRSA  Staph aureus mrsa   ceFAZolin >16 mcg/mL Resistant   clindamycin >4 mcg/mL Resistant   erythromycin >4 mcg/mL Resistant   oxacillin >2 mcg/mL Resistant   tetracycline >8 mcg/mL Resistant   trimethoprim-sulfamethoxazole >2/38 mcg/mL Resistant   vancomycin 1 mcg/mL Sensitive      Imaging: Chest imaging was reviewed by me and showed   CTPA 12/13/2021  1. No evidence of pulmonary embolism or acute pulmonary abnormality.   2. Scarring at the right lung base     CXR 12/20/2021   Increased patchy opacities in the right lung which may reflect airspace   disease superimposed on background emphysematous changes.      ASSESSMENT:  · Acute hypoxic respiratory failure; no baseline O2 prior to COVID - sent home on 2 L O2 upon d/c 12/16/21 for COVID-19 pneumonia  · HCAP - MRSA  · Hyponatremia  · COVID-19 pneumonia in a fully vaccinated individual with Pfizer series March 2021; sx onset 12/13/21  · Lactic acidosis  · COPD with bronchiectasis   · Tracheomalacia    · RA on Enbrel, MTX, & Prednisone -held  · CHF  · CAD s/p CABG 5/3/21  · DANIELLE, mild - on CPAP  · Former smoker, 20 pack-yr hx, quit 1991     PLAN:  · COVID-19 isolation, droplet plus  · Supplemental O2 to maintain SaO2 >92%; monitor sats closely   · Prednisone taper for COPD exacerbation, initially had Decadron x10 days for Covid  · Vancomycin #8 and completed Cefepime D#7   · I recommend 14 days of MRSA tx: PICC with IV Vanc (or PO Zyvox & hold Duloxetine).  CBC in AM  · On daily Azithromycin & Daliresp - plan to resume at d/c (needs new Azithro Rx)  · Inhaled bronchodilators only as needed, MDI preferred   · Prophylaxis: Lovenox 30 BID  · OOB to chair BID  · Has appt with Dr. Bobbi Cardenas 1/13/21  · Radha Alvarez to d/c on home O2 when cleared by IM

## 2021-12-28 NOTE — FLOWSHEET NOTE
12/28/21 0806   Vital Signs   Temp 98 °F (36.7 °C)   Temp Source Oral   Pulse 75   Heart Rate Source Monitor   Resp 18   /60   BP Location Right upper arm   Patient Position Sitting   Level of Consciousness Alert (0)   MEWS Score 1   Pain Assessment   Pain Assessment 0-10   Pain Level 3   Oxygen Therapy   SpO2 98 %   O2 Device Nasal cannula   O2 Flow Rate (L/min) 2 L/min   pt sitting up in bed no s/s of distress noted .  No needs voiced at this time

## 2021-12-28 NOTE — DISCHARGE SUMMARY
Name:  Yung Trujillo  Room:  /4231-09  MRN:    [id number]    Discharge Summary      This discharge summary is in conjunction with a complete physical exam done on the day of discharge. Discharging Physician: Dr. Guanako Baez: 12/20/2021  Discharge:   12/28/21     HPI taken from admission H&P:    The patient is a 80 y.o. female with COPD, CHF, bronchiectasis, DANIELLE, COVID 19 tested + 12/13/21 and admitted to [facility] for hypoxia 2/2 COVID 19 and CHF discharged to home on 12/16 who presented to the ED with complaint of shortness of breath. Patient states that she did well for a couple of days after discharge but then started getting increasing dyspnea. Associated with cough and some sputum production. No fevers. O2 sats dropped to 79% at home. Patient presented to the ER. Readmitted for hypoxemia. Required 6 L of oxygen on presentation. Currently O2 weaned back to 2 L. Work-up suggest possible superimposed pneumonia patient started on broad-spectrum IV antibiotics. She is feeling better today.     She has been vaccinated for Covid    Diagnoses this Admission and Hospital Course   #Acute hypoxic respiratory failure  - 2/2 PNA, COPD, and COVID 19  - patient dc'd after recent admit on 2L  -hypoxic on presentation , with O2 requirement up to 6 L .   O2 was weaned down 5 L -> 3L-> 2L. O2 sats stable on 2 L for the last couple of days. Continue to monitor O2 sats closely.      #Pneumonia   - Health care acquired: Due to MRSA . Respiratory cultures positive for MRSA .   -Procalcitonin elevated 1.3  -Continue to treat with antibiotics vanc day 8/14  cefepime - finished 7 days    Discharged home on Zithromax and Zyvox      #COPD with AE  - combivent   -On steroids  -Duo nebs as needed     #COVID 19  - in a vaccinated patient.  She completed 2 dose vaccination with Kar Oconnor in March 2021, has not received a booster dose.  She is immunocompromised on Enbrel and daily prednisone for RA  - symptoms of dyspnea present now for 8 days before this admission  - treated with decadron and remdesivir during most recent admit   - decadron resumed. Completed 10 days of Decadron now. - Continue supplemental oxygen as needed  - appreciate pulmonary input   - droplet + isolation      #Weakness and debility  #Orthostatic hypotension  #Hyponatremia  -Continue PT OT  -I increased dose of midodrine from 2.5 mg 3 times daily to 5 mg 3 times daily. Stop torsemide. Continue Toprol and Aldactone as at home. Monitor blood pressures closely. PT OT to reevaluate in a.m. Monitor BMP. Sodium level down to 124--> 126  today. Torsemide is on hold continue to monitor  1500 ml FR. Na is good today      #chronic diastolic CHF  - Home diuretics: Demadex 40 mg daily, spironolactone 25 mg daily  Patient is hyponatremic, hypotensive and orthostatic now. Will discontinue Demadex. Continue Aldactone only. And monitor closely.     #Hypokalemia   -Repleted     #CAD post CABG and stents   #PVD sp right femoral popliteal bypass surgery   - cont ASA, plavix, statin, toprol .   She is on a very low-dose of Toprol     #Mild Mitral Regurgitation   - f/b cardio     #Rheumatoid arthritis   - on Enbrel and daily prednisone at home- held      #Stage 4 sacral pressure ulcer  - f/b Dr Sumeet Florez in [facility]  - wound RN consult     #DM2  - with hyperglycemia  - cont Lantus-increased dose  - use SSI      #DANIELLE on CPAP     #Chronic pain  - cont home morphine and percocet  - cont gabapentin           Procedures (Please Review Full Report for Details)  None     Consults    Pulmonary       Physical Exam at Discharge:    /60   Pulse 75   Temp 98 °F (36.7 °C) (Oral)   Resp 18   Ht 5' 8\" (1.727 m)   Wt 124 lb 11.2 oz (56.6 kg)   SpO2 98%   BMI 18.96 kg/m²     See progress note day of discharge     CBC:   Recent Labs     12/27/21  0456 12/28/21  0501   WBC 7.4 6.8   HGB 10.6* 10.2*   HCT 31.8* 30.1*   MCV 91.2 89.1    293     BMP:   Recent Labs 12/26/21  0504 12/27/21  0456 12/28/21  0501   * 126* 132*   K 4.0 3.3* 4.1   CL 86* 88* 95*   CO2 32 30 29   BUN 34* 35* 35*   CREATININE 1.0 1.0 0.9       CULTURES  Blood: No growth to date   Sputum : Positive for MRSA    RADIOLOGY  XR CHEST PORTABLE   Final Result   Increased patchy opacities in the right lung which may reflect airspace   disease superimposed on background emphysematous changes.              Discharge Medications     Medication List      START taking these medications    azithromycin 250 MG tablet  Commonly known as: ZITHROMAX  Take 1 tablet by mouth See Admin Instructions 500mg on day 1 followed by 250mg on days 2 - 5     linezolid 600 MG tablet  Commonly known as: Zyvox  Take 1 tablet by mouth 2 times daily for 6 days        CHANGE how you take these medications    insulin glargine 100 UNIT/ML injection vial  Commonly known as: Lantus  Inject 30 Units into the skin nightly  What changed: how much to take     midodrine 5 MG tablet  Commonly known as: PROAMATINE  Take 1 tablet by mouth 3 times daily  What changed:   · medication strength  · how much to take     predniSONE 20 MG tablet  Commonly known as: DELTASONE  1 1/2 qd- 3 days,1 qd- 3 days,1/2 qd- 3 days  What changed:   · medication strength  · how much to take  · how to take this  · when to take this  · additional instructions        CONTINUE taking these medications    Acapella Misc  Take 1 Device by mouth as needed     Accu-Chek Zaira Plus strip  Generic drug: blood glucose test strips     albuterol sulfate  (90 Base) MCG/ACT inhaler  INHALE 2 PUFFS INTO THE LUNGS EVERY 4 HOURS AS NEEDED FOR WHEEZING     aspirin EC 81 MG EC tablet     atorvastatin 40 MG tablet  Commonly known as: LIPITOR     calcium carbonate 500 MG Tabs tablet  Commonly known as: OSCAL     cetirizine 10 MG tablet  Commonly known as: ZYRTEC     clopidogrel 75 MG tablet  Commonly known as: PLAVIX  Take 1 tablet by mouth daily     Daliresp 500 MCG tablet  Generic drug: Roflumilast  TAKE 1 TABLET BY MOUTH DAILY     docusate sodium 100 MG capsule  Commonly known as: COLACE     fluticasone 50 MCG/ACT nasal spray  Commonly known as: FLONASE  INHALE 2 SPRAYS IN EACH NOSTRIL DAILY     gabapentin 300 MG capsule  Commonly known as: NEURONTIN     insulin lispro 100 UNIT/ML injection vial  Commonly known as: HUMALOG     Insulin Syringe-Needle U-100 31G X 5/16\" 0.5 ML Misc     ipratropium 0.06 % nasal spray  Commonly known as: ATROVENT  USE 2 SPRAYS BY NASAL ROUTE 2-4 TIMES DAILY     latanoprost 0.005 % ophthalmic solution  Commonly known as: XALATAN     metoprolol succinate 25 MG extended release tablet  Commonly known as:  Toprol XL  Take 0.5 tablets by mouth daily     morphine 15 MG extended release tablet  Commonly known as: MS CONTIN     Narcan 4 MG/0.1ML Liqd nasal spray  Generic drug: naloxone     omeprazole 40 MG delayed release capsule  Commonly known as: PRILOSEC     spironolactone 25 MG tablet  Commonly known as: ALDACTONE  Take 1 tablet by mouth daily     Teriparatide (Recombinant) 600 MCG/2.4ML Sopn injection  Commonly known as: FORTEO     torsemide 20 MG tablet  Commonly known as: DEMADEX  Take 2 tablets by mouth daily If weight 131 lbs or less, decrease to 20 mg daily     vitamin D 1000 UNIT Tabs tablet  Commonly known as: CHOLECALCIFEROL        STOP taking these medications    cyclobenzaprine 10 MG tablet  Commonly known as: FLEXERIL     dexamethasone 6 MG tablet  Commonly known as: Decadron     DULoxetine 60 MG extended release capsule  Commonly known as: CYMBALTA  Notes to patient: hold while on steroids follow up with PCP      etanercept 50 MG/ML injection  Commonly known as: ENBREL     oxyCODONE-acetaminophen  MG per tablet  Commonly known as: PERCOCET           Where to Get Your Medications      These medications were sent to [facility] [phone]  [address] [place] [address],[address] [place] [address]    Phone: [phone]   · azithromycin 250 MG tablet  · midodrine 5 MG tablet  · predniSONE 20 MG tablet     You can get these medications from any pharmacy    Bring a paper prescription for each of these medications  · linezolid 600 MG tablet     Information about where to get these medications is not yet available    Ask your nurse or doctor about these medications  · insulin glargine 100 UNIT/ML injection vial           Discharged in stable condition to home     Follow Up: Follow up with PCP in 1 week    Total time spent on discharge is 35 minutes      BOBBI Hameed.

## 2021-12-28 NOTE — FLOWSHEET NOTE
12/27/21 2122   Vital Signs   Temp 97.3 °F (36.3 °C)   Temp Source Oral   Pulse 72   Heart Rate Source Monitor   Resp 18   BP (!) 109/55   BP Location Right upper arm   Patient Position Semi fowlers   Level of Consciousness Alert (0)   MEWS Score 1   Patient Currently in Pain Yes  (chronic)   Oxygen Therapy   SpO2 91 %   Pulse Oximeter Device Mode Continuous   Pulse Oximeter Device Location Finger   O2 Device Nasal cannula   O2 Flow Rate (L/min) 2 L/min     Shift assessment complete, see flowsheets. Medications given, see MAR. Pt A&O x4. Drink provided to pt. Educated pt on 1500 ml fluid restriction. Repositioned for comfort. No s/s of discomfort. No needs at this time. Bed locked in lowest position. Call light and bedside table within reach. Will continue to monitor.

## 2021-12-28 NOTE — PROGRESS NOTES
Verbal report and transfer of care given to [facility]. Pt currently resting in bed with the call light within reach. . Pt stable at this time.

## 2021-12-28 NOTE — PROGRESS NOTES
Patient educated on discharge instructions as well as new medications use, dosage, administration and possible side effects. Patient verified knowledge. IV removed without difficulty and dry dressing in place. Telemetry monitor removed and [facility] to [facility]. Pt leaving facility in stable condition to Home with all of their personal belongings.

## 2021-12-29 ENCOUNTER — CARE COORDINATION (OUTPATIENT)
Dept: CASE MANAGEMENT | Age: 71
End: 2021-12-29

## 2021-12-29 NOTE — CARE COORDINATION
Patient contacted regarding COVID-19 diagnosis. COVID-19 Detected on 12/13/2021. Call within 2 business days of discharge: Yes  Discharge Date: 12/28/21   RARS: Readmission Risk Score: 24.6 ( )    Was this an external facility discharge? No Discharge Facility: NA      1st attempt - Unable to reach patient by phone at this time. Message left including CTN contact info for return call. A [employer] message was sent to patient at this time ([phone]).  Outreach to [facility] and millie Nuno confirmed JONES and she has referral.    Shaunna Napoles, RN  Care Transitions Nurse  [phone] mobile    Future Appointments   Date Time Provider Vipul Hester   1/7/2022  9:15 AM [facility]   1/13/2022  3:15 PM MD ALLYSON Braga [facility]

## 2021-12-30 ENCOUNTER — CARE COORDINATION (OUTPATIENT)
Dept: CASE MANAGEMENT | Age: 71
End: 2021-12-30

## 2021-12-30 NOTE — TELEPHONE ENCOUNTER
Ronit Blanco if you look at the discharge meds it says to take the torsemide only the weight is about 131 pounds

## 2021-12-30 NOTE — TELEPHONE ENCOUNTER
Hi. I did not discharge her, I just prepped the discharge for Dr. Dm Mcclure. He would be the one that would need to clarify the dose.     Thanks,   Shiela Au

## 2021-12-30 NOTE — CARE COORDINATION
Patient contacted regarding COVID-19 diagnosis. Discussed COVID-19 related testing which was available at this time. COVID-19 Detected on 12/13/2021. Patient informed of results, if available? Yes    Call within 2 business days of discharge: Yes  Discharge Date: 12/28/21   RARS: Readmission Risk Score: 24.6 ( )    Was this an external facility discharge? No Discharge Facility: [facility] Transition Nurse contacted the daughter Tiarra Sampson (ok per HIPAA) by telephone to perform post discharge assessment. Provided introduction to self, and explanation of the CTN role, and reason for call due to risk factors for infection and/or exposure to COVID-19. Symptoms reviewed with family who verbalized the following symptoms: no new symptoms and no worsening symptoms. Due to no new or worsening symptoms encounter was not routed to provider for escalation. Discussed follow-up appointments. If no appointment was previously scheduled, appointment scheduling offered: already done  [id number] follow up appointment(s):   Future Appointments   Date Time Provider Vipul Hester   1/7/2022  9:15 AM MD Ingrid Cruz [facility]   1/13/2022  3:15 PM MD ALLYSON Florence [facility]     [id number] Della Sandhu follow up appointment(s): PCP is Bhavani Quach and appt on 1/5/2022    Non-face-to-face services provided:  Obtained and reviewed discharge summary and/or continuity of care documents    Advance Care Planning:   Does patient have an Advance Directive:  decision maker updated. Daughter was given an opportunity to verbalize any questions and concerns and agrees to contact the CTN or health care provider for questions related to their healthcare. Reviewed and educated family on any new and changed medications related to discharge diagnosis. Was patient discharged with a pulse oximeter? Yes CTN discussed pulse oximeter instructions and when to notify provider or seek emergency care.      BS decreasing and better control now

## 2021-12-30 NOTE — CARE COORDINATION
Outreach to patient and instructed her to hold torsemide unless weight >131# per Dr Nicol Rowe response. Reviewed her PCP OV in 6 days to discuss labwork and recommendation for this med at that time. Reviewed fluid restriction, monitor daily weights, edema, and limit fluids. She voices understanding.      Davis Maloney, RN  Care Transition Nurse  [phone] mobile    Future Appointments   Date Time Provider Vipul Hester   1/7/2022  9:15 [facility]   1/13/2022  3:15 PM MD ALLYSON [facility]

## 2022-01-05 ENCOUNTER — HOSPITAL ENCOUNTER (OUTPATIENT)
Age: 72
Setting detail: SPECIMEN
Discharge: HOME | End: 2022-01-05
Payer: MEDICARE

## 2022-01-05 LAB
A/G RATIO: 0.9 (ref 1.1–2.2)
ALBUMIN SERPL-MCNC: 3.1 G/DL (ref 3.4–5)
ALP BLD-CCNC: 140 U/L (ref 40–129)
ALT SERPL-CCNC: 26 U/L (ref 10–40)
ANION GAP SERPL CALCULATED.3IONS-SCNC: 13 MMOL/L (ref 3–16)
AST SERPL-CCNC: 22 U/L (ref 15–37)
BASOPHILS ABSOLUTE: 0 K/UL (ref 0–0.2)
BASOPHILS RELATIVE PERCENT: 0.3 %
BILIRUB SERPL-MCNC: 0.6 MG/DL (ref 0–1)
BUN BLDV-MCNC: 26 MG/DL (ref 7–20)
CALCIUM SERPL-MCNC: 8.8 MG/DL (ref 8.3–10.6)
CHLORIDE BLD-SCNC: 98 MMOL/L (ref 99–110)
CO2: 24 MMOL/L (ref 21–32)
CREAT SERPL-MCNC: 0.8 MG/DL (ref 0.6–1.2)
EOSINOPHILS ABSOLUTE: 0 K/UL (ref 0–0.6)
EOSINOPHILS RELATIVE PERCENT: 0.5 %
GFR AFRICAN AMERICAN: >60
GFR NON-AFRICAN AMERICAN: >60
GLUCOSE BLD-MCNC: 210 MG/DL (ref 70–99)
HCT VFR BLD CALC: 31 % (ref 36–48)
HEMOGLOBIN: 10.4 G/DL (ref 12–16)
LYMPHOCYTES ABSOLUTE: 0.4 K/UL (ref 1–5.1)
LYMPHOCYTES RELATIVE PERCENT: 4.6 %
MCH RBC QN AUTO: 31.2 PG (ref 26–34)
MCHC RBC AUTO-ENTMCNC: 33.5 G/DL (ref 31–36)
MCV RBC AUTO: 93.2 FL (ref 80–100)
MONOCYTES ABSOLUTE: 0.3 K/UL (ref 0–1.3)
MONOCYTES RELATIVE PERCENT: 3.8 %
NEUTROPHILS ABSOLUTE: 7 K/UL (ref 1.7–7.7)
NEUTROPHILS RELATIVE PERCENT: 90.8 %
PDW BLD-RTO: 15.2 % (ref 12.4–15.4)
PLATELET # BLD: 230 K/UL (ref 135–450)
PMV BLD AUTO: 7.7 FL (ref 5–10.5)
POTASSIUM SERPL-SCNC: 5.4 MMOL/L (ref 3.5–5.1)
RBC # BLD: 3.33 M/UL (ref 4–5.2)
SODIUM BLD-SCNC: 135 MMOL/L (ref 136–145)
TOTAL PROTEIN: 6.5 G/DL (ref 6.4–8.2)
WBC # BLD: 7.7 K/UL (ref 4–11)

## 2022-01-05 PROCEDURE — 80053 COMPREHEN METABOLIC PANEL: CPT

## 2022-01-05 PROCEDURE — 85025 COMPLETE CBC W/AUTO DIFF WBC: CPT

## 2022-01-07 ENCOUNTER — CARE COORDINATION (OUTPATIENT)
Dept: CASE MANAGEMENT | Age: 72
End: 2022-01-07

## 2022-01-07 ENCOUNTER — HOSPITAL ENCOUNTER (OUTPATIENT)
Dept: WOUND CARE | Age: 72
Discharge: HOME | End: 2022-01-07
Payer: MEDICARE

## 2022-01-07 VITALS
BODY MASS INDEX: 20.07 KG/M2 | TEMPERATURE: 97.1 F | HEIGHT: 68 IN | DIASTOLIC BLOOD PRESSURE: 68 MMHG | OXYGEN SATURATION: 93 % | HEART RATE: 81 BPM | RESPIRATION RATE: 18 BRPM | WEIGHT: 132.4 LBS | SYSTOLIC BLOOD PRESSURE: 126 MMHG

## 2022-01-07 DIAGNOSIS — L89.620 UNSTAGEABLE PRESSURE ULCER OF LEFT HEEL (HCC): ICD-10-CM

## 2022-01-07 DIAGNOSIS — L89.222 PRESSURE ULCER OF LEFT HIP, STAGE 2 (HCC): ICD-10-CM

## 2022-01-07 DIAGNOSIS — L89.154 PRESSURE ULCER OF COCCYGEAL REGION, STAGE 4 (HCC): Primary | ICD-10-CM

## 2022-01-07 LAB
GLUCOSE BLD-MCNC: 66 MG/DL (ref 70–99)
PERFORMED ON: ABNORMAL

## 2022-01-07 PROCEDURE — 11042 DBRDMT SUBQ TIS 1ST 20SQCM/<: CPT

## 2022-01-07 PROCEDURE — 97597 DBRDMT OPN WND 1ST 20 CM/<: CPT | Performed by: INTERNAL MEDICINE

## 2022-01-07 PROCEDURE — 97597 DBRDMT OPN WND 1ST 20 CM/<: CPT

## 2022-01-07 PROCEDURE — 11042 DBRDMT SUBQ TIS 1ST 20SQCM/<: CPT | Performed by: INTERNAL MEDICINE

## 2022-01-07 RX ORDER — LIDOCAINE HYDROCHLORIDE 40 MG/ML
SOLUTION TOPICAL ONCE
Status: CANCELLED | OUTPATIENT
Start: 2022-01-07 | End: 2022-01-07

## 2022-01-07 RX ORDER — LIDOCAINE 50 MG/G
OINTMENT TOPICAL ONCE
Status: CANCELLED | OUTPATIENT
Start: 2022-01-07 | End: 2022-01-07

## 2022-01-07 RX ORDER — LIDOCAINE 40 MG/G
CREAM TOPICAL ONCE
Status: DISCONTINUED | OUTPATIENT
Start: 2022-01-07 | End: 2022-01-08 | Stop reason: HOSPADM

## 2022-01-07 RX ORDER — BACITRACIN ZINC AND POLYMYXIN B SULFATE 500; 1000 [USP'U]/G; [USP'U]/G
OINTMENT TOPICAL ONCE
Status: CANCELLED | OUTPATIENT
Start: 2022-01-07 | End: 2022-01-07

## 2022-01-07 RX ORDER — LIDOCAINE 40 MG/G
CREAM TOPICAL ONCE
Status: CANCELLED | OUTPATIENT
Start: 2022-01-07 | End: 2022-01-07

## 2022-01-07 ASSESSMENT — PAIN SCALES - GENERAL: PAINLEVEL_OUTOF10: 0

## 2022-01-07 NOTE — PLAN OF CARE
Patient follow up today after being hospitalized and unable to come to [address] [place] [address],[address] for several weeks. MD debrided wounds and patient tolerated well. [facility] ROHO having a slow air leak. Patient is to follow up in 1 week in the wound clinic. Discharge instructions reviewed with patient, all questions answered, copy given to patient. Dressings were applied to all wounds per M.D. Instructions at this visit.

## 2022-01-07 NOTE — PROGRESS NOTES
215 [facility] Physician Orders and Discharge [address], [address]  [place], [facility]  Telephone: [phone]      Fax: [phone] home care Mdaiha Escobedo  Nurse Codi Barkley [phone]      Your wound-care supplies will be provided by: [employer] orders supplies through [employer]. Please note, depending on your insurance coverage, you may have out-of-pocket expenses for these supplies. Someone from [employer] may call you to confirm your order and discuss those potential costs before they ship your products -- please anticipate that call. If your out-of-pocket cost is going to be less than $200, and [employer] cannot reach you, they may ship your supplies as soon as the order is processed, and will send you a bill. If your out-of-pocket cost is going to be more than $200, [employer] should not ship your supplies until they speak with you. If you have any questions about your supplies or your potential out-of-pocket costs, or if you need to place an order for a refill of supplies (typically monthly), Kandi Lanier is your local representative, and can be reached at [phone]. Information on [employer]'s return policy will also be enclosed with your supplies -- please read that carefully before opening your items.      NAME:  Gris Taveras   DATE of BIRTH:  1950  PRIMARY DIAGNOSIS FOR WOUND CARE CENTER:  Pressure ulcer     Wound cleansing:   Do not scrub or use excessive force. Wash hands with soap and water before and after dressing changes. Prior to applying a clean dressing, cleanse wound with normal saline, wound cleanser, or mild soap and water.  Ask your physician or nurse before getting the wound(s) wet in the shower.                Wound care for home:     Right Hip:  Antibiotic ointment and band aid  Change dressing every day    Left Medial Heel, Deep tissue injury:  Paint with Betadine daily  leave open to air     Sacral Wound: Vashe or Hypochlorous acid soaked gauze applied to wound for 2-3 minutes, no need to rinse  Triad or Zinc Oxide to maegan-wound  Multidex Gel then Collagen with silver to wound bed & tuck into depth of wound  Fluff gauze over wound  Cover with small sacral mepilex border  HHC to change dressing on Monday, Wednesday, Friday next week        Please note, all wounds (unless stated otherwise here) were mechanically debrided at the time of cleansing here in the wound-care center today, so a small amount of pain, drainage or bleeding from that process might be expected, and is normal.      All products for home use, including multiple products for a single wound if applicable, are medically necessary in order to achieve the best chance at timely wound healing. See provider documentation for details if needed.     Substituted dressings applied in the [facility] today, if applicable:     N/a     New orders for this week (labs, imaging, medications, etc.):     N/a       Additional instructions for specific diagnoses:     +ROHO cushion- use at [address] when sitting!!  +True Balance Air group II mattress      General comments for pressure ulcers:  *  Make sure you stay well-hydrated, and maintain good protein intake. *  Reposition at least every two hours, to keep from having pressure in one spot for too long. *  If you are not sure whether you have the best offloading surfaces (mattress, mattress overlay, chair cushion, heel-protector boots, etc), please ask. *  Moisturize your skin regularly with Vaseline, Aquaphor, Aveeno, CeraVe, Cetaphil, Eucerin, Lubriderm, etc; but keep the skin between your toes dry.   *  If you smoke, your wound can not heal properly -- please talk with us when you're ready to quit.         F/U Appointment is with Dr. Jono Guzmán in 1 week on                                                at                       .     Your nurse  is ABRAHAM Zambrano      If we applied slip-resistant hospital socks today, be sure to remove them at least once a day to inspect your toes or feet, even if you're not changing the wraps or dressings underneath.  If you see anything concerning (redness, excess moisture, etc), please call and let us know right away.     Should you experience any significant changes in your wound(s) (including redness, increased warmth, increased pain, increased drainage, odor, or fever) or have questions about your wound care, please contact the [facility] at [phone] Monday-Thursday from 8:00 am - 4:30 pm, or Friday from 8:00 am - 2:30 pm.  If you need help with your wound outside these hours and cannot wait until we are again available, contact your home-care company (if applicable), your PCP, or go to the nearest emergency room

## 2022-01-07 NOTE — CARE COORDINATION
Patient contacted regarding COVID-19 diagnosis. COVID-19 Detected on 12/13/2021.       Care Transition Nurse contacted the patient by telephone to perform follow-up assessment. Symptoms reviewed with patient who verbalized the following symptoms: no new symptoms and no worsening symptoms. Due to no new or worsening symptoms encounter was not routed to provider for escalation. Patient was given an opportunity to verbalize any questions and concerns and agrees to contact the CTN or health care provider for questions related to their healthcare. Feels well. Occasional cough. No SOB noted. Active with [facility] and [facility]. Reviewed upcoming appointments. Completed OV with non-[facility] PCP and confirmed torsemide prn for weight gain, edema. Denies needs at this time. CTN will follow up after her upcoming appointments. Plan for follow-up call in 7-14 days based on severity of symptoms and risk factors.     Raffaele Lees RN  Care Transition Nurse  [phone] mobile    Future Appointments   Date Time Provider Department Center   1/13/2022  3:15 PM Ginny San [facility]   1/14/2022  9:15 AM Dania Oshea MD [address] [facility]

## 2022-01-12 PROBLEM — L89.222 PRESSURE ULCER OF LEFT HIP, STAGE 2 (HCC): Status: ACTIVE | Noted: 2022-01-12

## 2022-01-12 PROBLEM — L89.620 UNSTAGEABLE PRESSURE ULCER OF LEFT HEEL (HCC): Status: ACTIVE | Noted: 2022-01-12

## 2022-01-12 NOTE — PROGRESS NOTES
[address] [facility] Progress Note    Jhoan Rivera     : 1950    DATE OF VISIT:  2022    Subjective:     Jhoan Rivera is a 80 y.o. female who has a pressure ulcer located on the sacrum. Significant symptoms or pertinent wound history since last visit: we haven't seen her in about a month, due to two hospitalizations, related to CHF, respiratory failure, COVID infection. Now back home, fatigued, has lost a bit of weight, still on O2, but doing better. Eating pretty well, offloading well now. Additional ulcer(s) noted? yes. Small right hip stage 2 PU, also a lateral heel unstageable eschar. Her current medication list consists of Acapella, Insulin Syringe-Needle U-100, Roflumilast, Teriparatide (Recombinant), albuterol sulfate HFA, aspirin EC, atorvastatin, azithromycin, blood glucose test strips, calcium carbonate, cetirizine, clopidogrel, docusate sodium, fluticasone, gabapentin, insulin glargine, insulin lispro, ipratropium, latanoprost, metoprolol succinate, midodrine, morphine, naloxone, omeprazole, predniSONE, spironolactone, torsemide, and vitamin D. Allergies: Atenolol    Objective:     Vitals:    22 0928   BP: 126/68   Pulse: 81   Resp: 18   Temp: 97.1 °F (36.2 °C)   TempSrc: Oral   SpO2: 93%   Weight: 132 lb 6.4 oz (60.1 kg)   Height: 5' 8\" (1.727 m)     AAOx3, fatigued, NAD  No cellulitis, angitis, fluctuance  No acute arthritis or bursitis  No contact dermatitis or cutaneous Candidiasis  Blanca-ulcer skin: indurated, pink, warm and dry, some hyperkeratosis at heel. Ulcer(s):  sacrum is maybe just a bit larger, more fibrotic and less granular than last month, small area of SQ necrosis, some decent pink tissue, still a couple of pockets of depth and undermining, fibrin, biofilm; right hip very small, stage 2, fibrinous necrosis; heel a stable dark eschar with signs of prior bulla and hyperkeratosis, old hemorrhage.  Photos also saved in electronic chart.    Today's wound measurements, per RN documentation:  Wound 12/20/21 #5, Left Heel, Pressure, DTI, (onset 12/10/2021)-Wound Length (cm):  (Unable to assess; MD to assess)  Wound 12/18/20 #2, Sacrum, Pressure Injury, Stage 4, Onset 5/2020-Wound Length (cm): 3.1 cm  Wound 12/20/21 #4, Right Hip, Pressure, Stage 2, (onset 12/10/2021)-Wound Length (cm): 0.2 cm    Wound 12/18/20 #2, Sacrum, Pressure Injury, Stage 4, Onset 5/2020-Wound Width (cm): 1.7 cm  Wound 12/20/21 #4, Right Hip, Pressure, Stage 2, (onset 12/10/2021)-Wound Width (cm): 0.1 cm    Wound 12/18/20 #2, Sacrum, Pressure Injury, Stage 4, Onset 5/2020-Wound Depth (cm): 2.1 cm  Wound 12/20/21 #4, Right Hip, Pressure, Stage 2, (onset 12/10/2021)-Wound Depth (cm): 0.1 cm    Assessment:     Patient Active Problem List   Diagnosis Code    Rheumatoid arthritis ([facility] M06.9    Psoriasis L40.9    GERD (gastroesophageal reflux disease) K21.9    Hyponatremia E87.1    Anemia D64.9    Cylindrical bronchiectasis ([facility]) J47.9    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    Immunocompromised state ([facility] D84.9    Coronary artery disease I25.10    Bilateral lower extremity edema R60.0    Essential hypertension I10    Lumbar spondylosis M47.816    Mitral valve insufficiency and aortic valve insufficiency I08.0    Mixed hyperlipidemia E78.2    Myopia of both eyes H52.13    Osteoporosis M81.0    Other chronic sinusitis J32.8    Primary open angle glaucoma (POAG) of both eyes, mild stage H40.1131    Primary osteoarthritis of right hip M16.11    Type 2 diabetes mellitus with unspecified diabetic retinopathy without macular edema ([facility]) E11.319    Type 2 diabetes mellitus with diabetic peripheral angiopathy without gangrene, with long-term current use of insulin ([facility]) E11.51, Z79.4    Pressure ulcer of coccygeal region, stage 4 (HCC) L89.154    Hx of CABG Z95.1    Mild malnutrition (HCC) E44.1    Chronic diastolic congestive heart failure (HCC) I50.32    Chronic obstructive pulmonary disease ([facility]) J44.9    Nonrheumatic mitral (valve) insufficiency I34.0    Acute respiratory failure with hypoxia ([facility]) J96.01    COVID-19 U07.1    Acute on chronic respiratory failure with hypoxia ([facility]) J96.21    HCAP (healthcare-associated pneumonia) J18.9    COVID U07.1    Pneumonia due to infectious organism J18.9    Hypokalemia E87.6    Unstageable pressure ulcer of left heel ([facility]) L89.620    Pressure ulcer of left hip, stage 2 ([facility]) F20.951       Assessment of today's active condition(s): RA, decreased mobility, somewhat stagnant stage 4 sacral ulcer, new stage 2 of the right hip, new stable unstageable area on the heel, no signs of ischemia or infection. Factors contributing to occurrence and/or persistence of the chronic ulcer include diabetes, chronic pressure, decreased mobility, shear force and immunosuppression. Medical necessity of today's visit is shown by the above documentation. Sharp debridement is indicated today, based upon the exam findings in the wound(s) above. Procedure note:     Consent obtained. Time out performed per [address] [place] [address] Dr policy. Anesthetic  Anesthetic: 4% Lidocaine Cream (sacrum and hip)     Using a #15 blade scalpel, I sharply debrided the right hip ulcer(s) down through and including the removal of dermis. The type(s) of tissue debrided included fibrin, biofilm and necrotic/eschar. Total Surface Area Debrided: 1 sq cm. Using a curette, I sharply debrided the sacrum ulcer(s) down through and including the removal of subcutaneous tissue. The type(s) of tissue debrided included fibrin, biofilm and necrotic/eschar. Total Surface Area Debrided: 5 sq cm. The ulcers were then irrigated with normal saline solution. The procedure was completed with a small amount of bleeding, and hemostasis was with pressure. The patient tolerated the procedure well, with no significant complications.  The patient's level of pain during and after the procedure was monitored. Post-debridement measurements, if different from pre-debridement, are in the flowsheet as well. Discharge plan:     Treatment in the wound care center today, per RN documentation: Wound 12/20/21 #5, Left Heel, Pressure, DTI, (onset 12/10/2021)-Dressing/Treatment: Other (comment) (Betadine)  Wound 12/18/20 #2, Sacrum, Pressure Injury, Stage 4, Onset 5/2020-Dressing/Treatment: Other (comment) (Triad to maegan,Multidex,CollagenAg,Gauze,Mepilex)  Wound 12/20/21 #4, Right Hip, Pressure, Stage 2, (onset 12/10/2021)-Dressing/Treatment: Other (comment) (Antibiotic ointment, mepilex). She has a [employer] boot for the foot. Continue ROHO and group 2 to offload the trunk. Keep pushing protein intake. Adding some Multidex (gel or powder depending on drainage) to the sacrum, to try to speed up granulation there. Home treatment: good handwashing before and after any dressing changes. Cleanse wound with saline or soap & water before dressing change. May use Vaseline (petrolatum), Aquaphor, Aveeno, CeraVe, Cetaphil, Eucerin, Lubriderm, etc for dry skin. Dressing type for home: as above, once daily. Written discharge instructions given to patient. Follow up in 1 week.     Electronically signed by Carmencita James MD on 1/12/2022 at 10:20 AM.

## 2022-01-13 ENCOUNTER — VIRTUAL VISIT (OUTPATIENT)
Dept: PULMONOLOGY | Age: 72
End: 2022-01-13
Payer: MEDICARE

## 2022-01-13 DIAGNOSIS — R09.02 HYPOXEMIA: ICD-10-CM

## 2022-01-13 DIAGNOSIS — J44.9 CHRONIC OBSTRUCTIVE PULMONARY DISEASE, UNSPECIFIED COPD TYPE (HCC): Primary | ICD-10-CM

## 2022-01-13 PROCEDURE — 99214 OFFICE O/P EST MOD 30 MIN: CPT | Performed by: INTERNAL MEDICINE

## 2022-01-13 RX ORDER — ONDANSETRON 4 MG/1
TABLET, FILM COATED ORAL EVERY 8 HOURS PRN
COMMUNITY
Start: 2021-12-09

## 2022-01-13 RX ORDER — OXYCODONE AND ACETAMINOPHEN 10; 325 MG/1; MG/1
1 TABLET ORAL DAILY
COMMUNITY
Start: 2022-01-11

## 2022-01-13 ASSESSMENT — SLEEP AND FATIGUE QUESTIONNAIRES
HOW LIKELY ARE YOU TO NOD OFF OR FALL ASLEEP WHILE SITTING QUIETLY AFTER LUNCH WITHOUT ALCOHOL: 1
HOW LIKELY ARE YOU TO NOD OFF OR FALL ASLEEP WHILE LYING DOWN TO REST IN THE AFTERNOON WHEN CIRCUMSTANCES PERMIT: 2
HOW LIKELY ARE YOU TO NOD OFF OR FALL ASLEEP WHEN YOU ARE A PASSENGER IN A CAR FOR AN HOUR WITHOUT A BREAK: 1
HOW LIKELY ARE YOU TO NOD OFF OR FALL ASLEEP WHILE SITTING AND TALKING TO SOMEONE: 0
HOW LIKELY ARE YOU TO NOD OFF OR FALL ASLEEP WHILE SITTING AND READING: 0
HOW LIKELY ARE YOU TO NOD OFF OR FALL ASLEEP WHILE WATCHING TV: 1
HOW LIKELY ARE YOU TO NOD OFF OR FALL ASLEEP WHILE SITTING INACTIVE IN A PUBLIC PLACE: 0
ESS TOTAL SCORE: 5
HOW LIKELY ARE YOU TO NOD OFF OR FALL ASLEEP IN A CAR, WHILE STOPPED FOR A FEW MINUTES IN TRAFFIC: 0

## 2022-01-13 NOTE — PROGRESS NOTES
PULMONARY, CRITICAL CARE AND SLEEP MEDICINE   CC: Cough    Interval History: 1/13/2022  - Here for 1-3 week hospital f/u & 6 month sleep f/u. Hospital summary below:  12/20/2021 returned to ER for hypoxemia to 70s% on 4 L & difficulty breathing after being discharged 12/16/21 from 3 day admission for [address] in a partially vaccinated pt & acute diastolic HF. Was initially d/c'd on a new O2 requirement of 2 L & decadron. Initially did well, even coming off O2 for a period on Saturday, but then had worsening of cough, respiratory sxs & weakness. After returning to the ED, was admitted again for 8 days for acute resp failure, MRSA HCAP, COPDe & decadron was resumed for COVID19 pna. Hospital course complicated by orthostatic hypotension & hyponatremia. D/c'd on Zpak & Linezolid with 14 days of MRSA coverage. Pt had no O2 requirement before COVID19 infection. - doing much better now, feels good. Interval History: 8/10/2021  Worked in for acute sick visit. One week increasing shortness of breath, worse with any exertion or talking. Cannot sleep flat at night. Much worse over last 24 hours. Got CXR that shows new pleural effusions and pulmonary edema. DANIELLE - was set back up with CPAP. Previous history: 61 yo with 2 month h/o severe waxing/waning cough, tx'd with avelox w/o improvement & then changed to Zpac, some improvement; then treated with prednisone & Zpac & then clearly better, then returned & retreated with cough medicine & prednisone. No cough prior to 3 months ago except with sinus drainage. Cough has slowly been improving since last visit here. DATA:   There were no vitals taken for this visit.'on RA  VIRTUAL  Constitutional:  NAD, NCAT  Eyes: EOM intact. Anicteric. HENT: Nose, ears, neck normal, trachea midline   Respiratory: No ASM, no obvious wheezing  Cardiovascular: No obvious peripheral edema.    Skin: No visible rash or nodule on visible face, upper thorax    Psychiatric: No anxiety or agitation. Neuro: A&O to P/P/E, judgement and insight intact    DATA:   PFT 1/2012   FEV1 2.2 L 77% nl ratio TLC 84% DLCO 18.29 88%  PFT 4/22/2013 FEV1 2.26 L 70%  TLC 92% DLCO 19.08 69%  PFT Mar 2014  FEV1 2.08 L 65%  TLC 5.63 88% DLCO 20.05 73%  PFT June 2014 FEV1 2.12 L 69%  TLC 6.14 99% DLCO 18.31 68%  PFT April 2016 FEV1 2.24 L FVC 3.2 L TLC 6.36 l DLCO 19.16 72%    Pertussis antibodies are positive    CPAP titration 8/20/19 CPAP 7  PSG 7/17/19 AHI 8, but AHI 46 with REM    CABG 5/3/2021   Urgent coronary bypass grafting surgery x3 with single greater saphenous vein graft to the posterior ventricular branch of the right coronary artery, separate single greater saphenous vein graft to the second obtuse marginal branch of circumflex, pedicled left internal artery to the LAD. Left atrial appendage obliteration    ABHISHEK 10/29/2021  LVEF 50-55%  Mild mitral regurgitation  Left atrium mildly dilated  Bubble study showed grade 1 pulmonary atrial venous malformation  Moderate layered plaque is noted in the ascending aorta & arch    CTPA 12/13/2021  Mediastinum: No evidence of mediastinal lymphadenopathy.  The heart and   pericardium demonstrate no acute abnormality.  There is no acute abnormality   of the thoracic aorta.       Lungs/pleura: The lungs are without acute process.  Linear parenchymal   scarring in pleural thickening seen at the right lung base.  No focal   consolidation or pulmonary edema.  No evidence of pleural effusion or   pneumothorax. Impression   No evidence of pulmonary embolism or acute pulmonary abnormality.    Scarring at the right lung base     ASSESSMENT:   · COPD/Chronic bronchitis/cough with cylindrical bronchiectasis  · Centrilobular pulmonary emphysema  · H/O COVID 18/73/60 complicated by MRSA pneumonia   · Mild DANIELLE/REM related sleep disordered breathing - recently restarted CPAP with benefit   · CAD s/p CABG 5/3/21  · Tracheomalacia    · Lower extremity edema  · Pulmonary Nodules have resolved  · Rheumatoid arthritis on Embrel and MTX and 4 mg prednisone  · Positive tobacco history, 20 pack year quit in 1991  · Diabetes    PLAN:   · Continue azithromycin daily  · Continue Daliresp; off Singulair  -- Failed Singulair, Failed Dulera, Failed Spiriva, Failed Advair. · I recommended regular use of CPAP. · D/C home oxygen   · F/U will be with me in 4-6 months     Stephane Rossi is a 80 y.o. female being evaluated by a Virtual Visit (audiovideo visit) encounter to address concerns as mentioned above. A caregiver was present when appropriate. Due to this being a TeleHealth encounter (During COVID-19 pandemic, evaluation of the following organ systems was limited: Vitals/Constitutional/EENT/Resp/CV/GI//MS/Neuro/Skin/Heme-Lymph-Imm. Pursuant to the emergency declaration under the [address] [place] [address] and the [email or web address] and [employer] Act, this Virtual Visit was conducted with patient's (and/or legal guardian's) consent, to reduce the patient's risk of exposure to COVID-19 and provide necessary medical care. The patient (and/or legal guardian) is advised to contact this office for worsening conditions or problems, and to seek emergency medical treatment and/or call 911 for urgent concerns. Services were provided through a audiovideo synchronous discussion virtually to substitute for in-person clinic visit. Patient was located outside the office/hospital, in home or usual environment; provider was located in his office.

## 2022-01-14 ENCOUNTER — HOSPITAL ENCOUNTER (OUTPATIENT)
Dept: WOUND CARE | Age: 72
Discharge: HOME | End: 2022-01-14
Payer: MEDICARE

## 2022-01-14 VITALS
WEIGHT: 142 LBS | HEART RATE: 86 BPM | RESPIRATION RATE: 18 BRPM | BODY MASS INDEX: 21.52 KG/M2 | HEIGHT: 68 IN | TEMPERATURE: 98.9 F | DIASTOLIC BLOOD PRESSURE: 59 MMHG | SYSTOLIC BLOOD PRESSURE: 120 MMHG

## 2022-01-14 DIAGNOSIS — L89.154 PRESSURE ULCER OF COCCYGEAL REGION, STAGE 4 (HCC): Primary | ICD-10-CM

## 2022-01-14 PROCEDURE — 97597 DBRDMT OPN WND 1ST 20 CM/<: CPT

## 2022-01-14 PROCEDURE — 11042 DBRDMT SUBQ TIS 1ST 20SQCM/<: CPT

## 2022-01-14 PROCEDURE — 11042 DBRDMT SUBQ TIS 1ST 20SQCM/<: CPT | Performed by: INTERNAL MEDICINE

## 2022-01-14 RX ORDER — LIDOCAINE 40 MG/G
CREAM TOPICAL ONCE
Status: DISCONTINUED | OUTPATIENT
Start: 2022-01-14 | End: 2022-01-15 | Stop reason: HOSPADM

## 2022-01-14 RX ORDER — LIDOCAINE HYDROCHLORIDE 40 MG/ML
SOLUTION TOPICAL ONCE
Status: CANCELLED | OUTPATIENT
Start: 2022-01-14 | End: 2022-01-14

## 2022-01-14 RX ORDER — LIDOCAINE 50 MG/G
OINTMENT TOPICAL ONCE
Status: CANCELLED | OUTPATIENT
Start: 2022-01-14 | End: 2022-01-14

## 2022-01-14 RX ORDER — LIDOCAINE 40 MG/G
CREAM TOPICAL ONCE
Status: CANCELLED | OUTPATIENT
Start: 2022-01-14 | End: 2022-01-14

## 2022-01-14 RX ORDER — PREDNISONE 1 MG/1
4 TABLET ORAL DAILY
COMMUNITY

## 2022-01-14 RX ORDER — BACITRACIN ZINC AND POLYMYXIN B SULFATE 500; 1000 [USP'U]/G; [USP'U]/G
OINTMENT TOPICAL ONCE
Status: CANCELLED | OUTPATIENT
Start: 2022-01-14 | End: 2022-01-14

## 2022-01-14 ASSESSMENT — PAIN SCALES - GENERAL: PAINLEVEL_OUTOF10: 0

## 2022-01-14 NOTE — PROGRESS NOTES
215 [facility] Physician Orders and Discharge 800 [facility]  [address], [address]  [place], [facility]  Telephone: [phone]      Fax: [phone] home care Madiha Escobedo  Nurse Camila Walters [phone]      Your wound-care supplies will be provided by: [employer] orders supplies through [employer]. Please note, depending on your insurance coverage, you may have out-of-pocket expenses for these supplies. Someone from [employer] may call you to confirm your order and discuss those potential costs before they ship your products -- please anticipate that call. If your out-of-pocket cost is going to be less than $200, and [employer] cannot reach you, they may ship your supplies as soon as the order is processed, and will send you a bill. If your out-of-pocket cost is going to be more than $200, [employer] should not ship your supplies until they speak with you. If you have any questions about your supplies or your potential out-of-pocket costs, or if you need to place an order for a refill of supplies (typically monthly), Marcio Mejias is your local representative, and can be reached at [phone]. Information on [employer]'s return policy will also be enclosed with your supplies -- please read that carefully before opening your items.      NAME:  Gris Taveras   DATE of BIRTH:  1950  PRIMARY DIAGNOSIS FOR WOUND CARE CENTER:  Pressure ulcer     Wound cleansing:   Do not scrub or use excessive force. Wash hands with soap and water before and after dressing changes. Prior to applying a clean dressing, cleanse wound with normal saline, wound cleanser, or mild soap and water.  Ask your physician or nurse before getting the wound(s) wet in the shower.                Wound care for home:        Left Medial Heel, Deep tissue injury:  Paint with Betadine daily  leave open to air      Sacral Wound:   Vashe or Hypochlorous acid soaked gauze applied to wound for 2-3 minutes, no need to rinse  Triad or Zinc Oxide to maegan-wound  Multidex Powder then Collagen with silver to wound bed & tuck into depth of wound  Fluff gauze over wound  Cover with small sacral mepilex border  C to change dressing on Monday, Wednesday, Friday next week        Please note, all wounds (unless stated otherwise here) were mechanically debrided at the time of cleansing here in the wound-care center today, so a small amount of pain, drainage or bleeding from that process might be expected, and is normal.      All products for home use, including multiple products for a single wound if applicable, are medically necessary in order to achieve the best chance at timely wound healing. See provider documentation for details if needed.     Substituted dressings applied in the [address] [place] [address],[address] today, if applicable:      [facility]     New orders for this week (labs, imaging, medications, etc.):     N/a       Additional instructions for specific diagnoses:     +ROHO cushion- use at [address] when sitting!!  +True Balance Air group II mattress      General comments for pressure ulcers:  *  Make sure you stay well-hydrated, and maintain good protein intake. *  Reposition at least every two hours, to keep from having pressure in one spot for too long. *  If you are not sure whether you have the best offloading surfaces (mattress, mattress overlay, chair cushion, heel-protector boots, etc), please ask. *  Moisturize your skin regularly with Vaseline, Aquaphor, Aveeno, CeraVe, Cetaphil, Eucerin, Lubriderm, etc; but keep the skin between your toes dry.   *  If you smoke, your wound can not heal properly -- please talk with us when you're ready to quit.         F/U Appointment is with Dr. Ian Fermin in 2 weeks on                                                at                       .     Your nurse  is ABRAHAM Zambrano      If we applied slip-resistant hospital socks today, be sure to remove them at least once a day to inspect your toes or feet, even if you're not changing the wraps or dressings underneath.  If you see anything concerning (redness, excess moisture, etc), please call and let us know right away.     Should you experience any significant changes in your wound(s) (including redness, increased warmth, increased pain, increased drainage, odor, or fever) or have questions about your wound care, please contact the [employer] at [phone] Monday-Thursday from 8:00 am - 4:30 pm, or Friday from 8:00 am - 2:30 pm.  If you need help with your wound outside these hours and cannot wait until we are again available, contact your home-care company (if applicable), your PCP, or go to the nearest emergency room

## 2022-01-14 NOTE — PLAN OF CARE
Patient with some improvement noted in wound, debridement today per MD and patient tolerated well. Home care remains in place for intermittent wound care in the home. Follow up in 2 weeks in wound clinic. Discharge instructions reviewed with patient, all questions answered, copy given to patient. Dressings were applied to all wounds per M.D. Instructions at this visit.

## 2022-01-16 NOTE — PROGRESS NOTES
[facility] Progress Note    Sisi Boyle     : 1950    DATE OF VISIT:  2022    Subjective:     Sisi Boyle is a 80 y.o. female who has a pressure ulcer located on the sacrum. Significant symptoms or pertinent wound history since last visit: feeling ok, not much wound pain, eating a bit more, off O2 now. Additional ulcer(s) noted? yes. Heel unstageable lesion still there, hip healed. Her current medication list consists of Acapella, Insulin Syringe-Needle U-100, Roflumilast, Teriparatide (Recombinant), albuterol sulfate HFA, aspirin EC, atorvastatin, azithromycin, blood glucose test strips, calcium carbonate, cetirizine, clopidogrel, docusate sodium, fluticasone, gabapentin, insulin glargine, insulin lispro, ipratropium, latanoprost, metoprolol succinate, midodrine, morphine, naloxone, omeprazole, ondansetron, oxyCODONE-acetaminophen, predniSONE, spironolactone, torsemide, and vitamin D. Allergies: Atenolol    Objective:     Vitals:    22 0922   BP: (!) 120/59   Pulse: 86   Resp: 18   Temp: 98.9 °F (37.2 °C)   TempSrc: Oral   Weight: 142 lb (64.4 kg)   Height: 5' 8\" (1.727 m)     AAOx3, fatigued, NAD  No cellulitis, angitis, fluctuance  No acute arthritis or bursitis  No contact dermatitis or cutaneous Candidiasis  Blanca-ulcer skin: indurated, pink, warm and dry, some hyperkeratosis at heel. Ulcer(s):  sacrum is about the same size, just a bit more pink and red, a bit less fibrotic, some early new granulation, some fibrin and biofilm, still a couple of pockets of deep undermining; right hip healed; heel a stable dark eschar with signs of prior bulla and hyperkeratosis, old hemorrhage, a bit of hyperkeratosis lysed away today. Photos also saved in electronic chart.     Today's wound measurements, per RN documentation:  Wound 21 #5, Left Heel, Pressure, DTI, (onset 12/10/2021)-Wound Length (cm):  (MD to assess)  [REMOVED] Wound 21 #4, Right Hip, Pressure, Stage 2, (onset 12/10/2021)-Wound Length (cm): 0 cm  Wound 12/18/20 #2, Sacrum, Pressure Injury, Stage 4, Onset 5/2020-Wound Length (cm): 2.7 cm    [REMOVED] Wound 12/20/21 #4, Right Hip, Pressure, Stage 2, (onset 12/10/2021)-Wound Width (cm): 0 cm  Wound 12/18/20 #2, Sacrum, Pressure Injury, Stage 4, Onset 5/2020-Wound Width (cm): 1.7 cm    [REMOVED] Wound 12/20/21 #4, Right Hip, Pressure, Stage 2, (onset 12/10/2021)-Wound Depth (cm): 0 cm  Wound 12/18/20 #2, Sacrum, Pressure Injury, Stage 4, Onset 5/2020-Wound Depth (cm): 1 cm    Assessment:     Patient Active Problem List   Diagnosis Code    Rheumatoid arthritis ([facility] M06.9    Psoriasis L40.9    GERD (gastroesophageal reflux disease) K21.9    Hyponatremia E87.1    Anemia D64.9    Cylindrical bronchiectasis (HCC) J47.9    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    Immunocompromised state ([facility] [address] D84.9    Coronary artery disease I25.10    Bilateral lower extremity edema R60.0    Essential hypertension I10    Lumbar spondylosis M47.816    Mitral valve insufficiency and aortic valve insufficiency I08.0    Mixed hyperlipidemia E78.2    Myopia of both eyes H52.13    Osteoporosis M81.0    Other chronic sinusitis J32.8    Primary open angle glaucoma (POAG) of both eyes, mild stage H40.1131    Primary osteoarthritis of right hip M16.11    Type 2 diabetes mellitus with unspecified diabetic retinopathy without macular edema ([facility]) E11.319    Type 2 diabetes mellitus with diabetic peripheral angiopathy without gangrene, with long-term current use of insulin ([facility]) E11.51, Z79.4    Pressure ulcer of coccygeal region, stage 4 ([facility]) L89.154    Hx of CABG Z95.1    Mild malnutrition ([facility]) E44.1    Chronic diastolic congestive heart failure ([facility]) I50.32    Chronic obstructive pulmonary disease ([facility]) J44.9    Nonrheumatic mitral (valve) insufficiency I34.0    Acute respiratory failure with hypoxia ([facility]) J96.01    COVID-19 U07.1    Acute on chronic respiratory failure with hypoxia ([facility]) J96.21    HCAP (healthcare-associated pneumonia) J18.9    COVID U07.1    Pneumonia due to infectious organism J18.9    Hypokalemia E87.6    Unstageable pressure ulcer of left heel ([facility]) L89.620    Pressure ulcer of left hip, stage 2 ([facility]) F80.716       Assessment of today's active condition(s): RA, decreased mobility, longstanding sacral pressure ulcer, newer heel eschar (stable), no signs of infection. Factors contributing to occurrence and/or persistence of the chronic ulcer include diabetes, chronic pressure, decreased mobility, shear force and immunosuppression. Medical necessity of today's visit is shown by the above documentation. Sharp debridement is indicated today, based upon the exam findings in the wound(s) above. Procedure note:     Consent obtained. Time out performed per [facility]. Anesthetic  Anesthetic: 4% Lidocaine Cream     Using a curette, I sharply debrided the sacrum ulcer(s) down through and including the removal of subcutaneous tissue. The type(s) of tissue debrided included fibrin, biofilm and necrotic/eschar. Total Surface Area Debrided: 5 sq cm. The ulcers were then irrigated with normal saline solution. The procedure was completed with a small amount of bleeding, and hemostasis was with pressure. The patient tolerated the procedure well, with no significant complications. The patient's level of pain during and after the procedure was monitored. Post-debridement measurements, if different from pre-debridement, are in the flowsheet as well. Discharge plan:     Treatment in the wound care center today, per RN documentation: Wound 12/20/21 #5, Left Heel, Pressure, DTI, (onset 12/10/2021)-Dressing/Treatment:  (painted with betadine, then open to air)  Wound 12/18/20 #2, Sacrum, Pressure Injury, Stage 4, Onset 5/2020-Dressing/Treatment:  (Triad maegan, collagen with ag, fluff 4x4, Mepilex border).     Somehow her home-care company wasn't able to get Multidex from the DME company. We'll check into it. Continue work on offloading and protein intake. Home treatment: good handwashing before and after any dressing changes. Cleanse wound with saline or soap & water before dressing change. May use Vaseline (petrolatum), Aquaphor, Aveeno, CeraVe, Cetaphil, Eucerin, Lubriderm, etc for dry skin. Dressing type for home: as above, once daily. Written discharge instructions given to patient. Follow up in 2 weeks, but call sooner with concerns.     Electronically signed by Jerome Dahl MD on 1/16/2022 at 10:59 AM.

## 2022-01-19 ENCOUNTER — CARE COORDINATION (OUTPATIENT)
Dept: CASE MANAGEMENT | Age: 72
End: 2022-01-19

## 2022-01-19 NOTE — CARE COORDINATION
COVID Care Transitions Follow Up Call    Challenges to be reviewed by the provider   Additional needs identified to be addressed with provider: No           Encounter was not routed to provider for escalation. Method of communication with provider:  none. Contacted the patient by telephone to follow up after hospital visit. Status: significantly improved    [facility] follow up appointment(s):   Future Appointments   Date Time Provider Vipul Betancourtisti   1/28/2022  9:15 AM MD Jia Gomez   [facility]2022 11:15 [facility]     [facility] follow up appointment(s): PCP   Follow up appointment completed? Yes. Reports doing great. No longer needs oxygen. Maintains >90s RA at all times. Tolerating PO intake. Denies cough, fever, chills. [facility] and knows how to reach them for prn concerns. Denies needs today. Provided contact information for future needs. No further follow-up call indicated based on severity of symptoms and risk factors.     Unknown Rater, RN  Care Transition Nurse  [phone] mobile

## 2022-01-28 ENCOUNTER — HOSPITAL ENCOUNTER (OUTPATIENT)
Dept: WOUND CARE | Age: 72
Discharge: HOME | End: 2022-01-28

## 2022-02-04 ENCOUNTER — HOSPITAL ENCOUNTER (OUTPATIENT)
Dept: WOUND CARE | Age: 72
Discharge: HOME | End: 2022-02-04

## 2022-02-07 DIAGNOSIS — J47.9 BRONCHIECTASIS WITHOUT COMPLICATION (HCC): ICD-10-CM

## 2022-02-07 DIAGNOSIS — J44.9 CHRONIC OBSTRUCTIVE PULMONARY DISEASE, UNSPECIFIED COPD TYPE (HCC): Primary | ICD-10-CM

## 2022-02-07 RX ORDER — AZITHROMYCIN 250 MG/1
250 TABLET, FILM COATED ORAL DAILY
Qty: 90 TABLET | Refills: 3 | Status: SHIPPED | OUTPATIENT
Start: 2022-02-07

## 2022-02-11 ENCOUNTER — HOSPITAL ENCOUNTER (OUTPATIENT)
Dept: WOUND CARE | Age: 72
Discharge: HOME | End: 2022-02-11
Payer: MEDICARE

## 2022-02-11 VITALS
DIASTOLIC BLOOD PRESSURE: 70 MMHG | WEIGHT: 144.4 LBS | SYSTOLIC BLOOD PRESSURE: 124 MMHG | HEIGHT: 68 IN | TEMPERATURE: 99.4 F | HEART RATE: 96 BPM | BODY MASS INDEX: 21.89 KG/M2 | RESPIRATION RATE: 16 BRPM

## 2022-02-11 DIAGNOSIS — L89.154 PRESSURE ULCER OF COCCYGEAL REGION, STAGE 4 (HCC): Primary | ICD-10-CM

## 2022-02-11 PROCEDURE — 11042 DBRDMT SUBQ TIS 1ST 20SQCM/<: CPT | Performed by: INTERNAL MEDICINE

## 2022-02-11 PROCEDURE — 97597 DBRDMT OPN WND 1ST 20 CM/<: CPT

## 2022-02-11 PROCEDURE — 11042 DBRDMT SUBQ TIS 1ST 20SQCM/<: CPT

## 2022-02-11 PROCEDURE — 97597 DBRDMT OPN WND 1ST 20 CM/<: CPT | Performed by: INTERNAL MEDICINE

## 2022-02-11 RX ORDER — BACITRACIN ZINC AND POLYMYXIN B SULFATE 500; 1000 [USP'U]/G; [USP'U]/G
OINTMENT TOPICAL ONCE
Status: CANCELLED | OUTPATIENT
Start: 2022-02-11 | End: 2022-02-11

## 2022-02-11 RX ORDER — LIDOCAINE 50 MG/G
OINTMENT TOPICAL ONCE
Status: CANCELLED | OUTPATIENT
Start: 2022-02-11 | End: 2022-02-11

## 2022-02-11 RX ORDER — LIDOCAINE HYDROCHLORIDE 40 MG/ML
SOLUTION TOPICAL ONCE
Status: CANCELLED | OUTPATIENT
Start: 2022-02-11 | End: 2022-02-11

## 2022-02-11 RX ORDER — LIDOCAINE 40 MG/G
CREAM TOPICAL ONCE
Status: CANCELLED | OUTPATIENT
Start: 2022-02-11 | End: 2022-02-11

## 2022-02-11 RX ORDER — LIDOCAINE 40 MG/G
CREAM TOPICAL ONCE
Status: DISCONTINUED | OUTPATIENT
Start: 2022-02-11 | End: 2022-02-12 | Stop reason: HOSPADM

## 2022-02-11 ASSESSMENT — PAIN SCALES - GENERAL: PAINLEVEL_OUTOF10: 0

## 2022-02-11 NOTE — PROGRESS NOTES
[address] [facility] ([place], [place]) Physician Orders and Discharge [facility]  [address], [address]  [place], [facility]  Telephone: [phone]      Fax: [phone] home care Madiha Escobedo  Nurse Valerie Lentz [phone]      Your wound-care supplies will be provided by: [facility] AT [facility] orders supplies through [employer]. Please note, depending on your insurance coverage, you may have out-of-pocket expenses for these supplies. Someone from [place] may call you to confirm your order and discuss those potential costs before they ship your products -- please anticipate that call. If your out-of-pocket cost is going to be less than $200, and [employer] cannot reach you, they may ship your supplies as soon as the order is processed, and will send you a bill. If your out-of-pocket cost is going to be more than $200, [employer] should not ship your supplies until they speak with you. If you have any questions about your supplies or your potential out-of-pocket costs, or if you need to place an order for a refill of supplies (typically monthly), Tia Thomas is your local representative, and can be reached at [phone]. Information on [employer]'s return policy will also be enclosed with your supplies -- please read that carefully before opening your items.      NAME:  Gris Taveras   DATE of BIRTH:  1950  PRIMARY DIAGNOSIS FOR WOUND CARE CENTER:  Pressure ulcer     Wound cleansing:   Do not scrub or use excessive force. Wash hands with soap and water before and after dressing changes. Prior to applying a clean dressing, cleanse wound with normal saline, wound cleanser, or mild soap and water.  Ask your physician or nurse before getting the wound(s) wet in the shower.                Wound care for home:        Left Medial Heel, Deep tissue injury:  Mix Triad and Antibiotic ointment and apply to wound (if wound is more dry add more antibiotic ointment and if wound is draining more add more Triad)  Cover with a dry dressing  Change every day     Sacral Wound:   Vashe or Hypochlorous acid soaked gauze applied to wound for 2-3 minutes, no need to rinse  Triad or Zinc Oxide to maegan-wound  Multidex Powder then Collagen with silver to wound bed & tuck into depth of wound  Fluff gauze over wound  Cover with small sacral mepilex border  HHC to change dressing on Monday, Wednesday, Friday next week        Please note, all wounds (unless stated otherwise here) were mechanically debrided at the time of cleansing here in the wound-care center today, so a small amount of pain, drainage or bleeding from that process might be expected, and is normal.      All products for home use, including multiple products for a single wound if applicable, are medically necessary in order to achieve the best chance at timely wound healing. See provider documentation for details if needed.     Substituted dressings applied in the [facility] today, if applicable:      [facility]     New orders for this week (labs, imaging, medications, etc.):     WE ARE GIVING YOU A HEEL MEDIX BOOT TO WEAR ON THE LEFT FOOT WHEN IN BED TO RELIEVE PRESSURE       Additional instructions for specific diagnoses:     +ROHO cushion- use at ALL TIMES when sitting!!  +True Balance Air group II mattress      General comments for pressure ulcers:  *  Make sure you stay well-hydrated, and maintain good protein intake. *  Reposition at least every two hours, to keep from having pressure in one spot for too long. *  If you are not sure whether you have the best offloading surfaces (mattress, mattress overlay, chair cushion, heel-protector boots, etc), please ask. *  Moisturize your skin regularly with Vaseline, Aquaphor, Aveeno, CeraVe, Cetaphil, Eucerin, Lubriderm, etc; but keep the skin between your toes dry.   *  If you smoke, your wound can not heal properly -- please talk with us when you're ready to quit.         F/U

## 2022-02-11 NOTE — PLAN OF CARE
Wounds debrided today and patient tolerated well. Change in dressing regime to left heel patient is to follow up in 1 week. Discharge instructions reviewed with patient, all questions answered, copy given to patient. Dressings were applied to all wounds per M.D. Instructions at this visit.

## 2022-02-18 ENCOUNTER — HOSPITAL ENCOUNTER (OUTPATIENT)
Dept: WOUND CARE | Age: 72
Discharge: HOME | End: 2022-02-18
Payer: MEDICARE

## 2022-02-18 VITALS
TEMPERATURE: 98.6 F | RESPIRATION RATE: 18 BRPM | HEIGHT: 68 IN | BODY MASS INDEX: 22.51 KG/M2 | HEART RATE: 86 BPM | DIASTOLIC BLOOD PRESSURE: 72 MMHG | WEIGHT: 148.5 LBS | SYSTOLIC BLOOD PRESSURE: 126 MMHG

## 2022-02-18 DIAGNOSIS — L89.154 PRESSURE ULCER OF COCCYGEAL REGION, STAGE 4 (HCC): Primary | ICD-10-CM

## 2022-02-18 PROCEDURE — 11042 DBRDMT SUBQ TIS 1ST 20SQCM/<: CPT

## 2022-02-18 PROCEDURE — 11042 DBRDMT SUBQ TIS 1ST 20SQCM/<: CPT | Performed by: INTERNAL MEDICINE

## 2022-02-18 RX ORDER — BACITRACIN ZINC AND POLYMYXIN B SULFATE 500; 1000 [USP'U]/G; [USP'U]/G
OINTMENT TOPICAL ONCE
Status: CANCELLED | OUTPATIENT
Start: 2022-02-18 | End: 2022-02-18

## 2022-02-18 RX ORDER — LIDOCAINE 50 MG/G
OINTMENT TOPICAL ONCE
Status: CANCELLED | OUTPATIENT
Start: 2022-02-18 | End: 2022-02-18

## 2022-02-18 RX ORDER — LIDOCAINE 40 MG/G
CREAM TOPICAL ONCE
Status: CANCELLED | OUTPATIENT
Start: 2022-02-18 | End: 2022-02-18

## 2022-02-18 RX ORDER — LIDOCAINE HYDROCHLORIDE 40 MG/ML
SOLUTION TOPICAL ONCE
Status: CANCELLED | OUTPATIENT
Start: 2022-02-18 | End: 2022-02-18

## 2022-02-18 RX ORDER — LIDOCAINE 40 MG/G
CREAM TOPICAL ONCE
Status: DISCONTINUED | OUTPATIENT
Start: 2022-02-18 | End: 2022-02-19 | Stop reason: HOSPADM

## 2022-02-18 ASSESSMENT — PAIN DESCRIPTION - ONSET: ONSET: ON-GOING

## 2022-02-18 ASSESSMENT — PAIN DESCRIPTION - DESCRIPTORS: DESCRIPTORS: STABBING

## 2022-02-18 ASSESSMENT — PAIN DESCRIPTION - PAIN TYPE: TYPE: ACUTE PAIN

## 2022-02-18 ASSESSMENT — PAIN SCALES - GENERAL
PAINLEVEL_OUTOF10: 9
PAINLEVEL_OUTOF10: 9

## 2022-02-18 ASSESSMENT — PAIN DESCRIPTION - FREQUENCY: FREQUENCY: INTERMITTENT

## 2022-02-18 ASSESSMENT — PAIN DESCRIPTION - LOCATION: LOCATION: FOOT

## 2022-02-18 ASSESSMENT — PAIN DESCRIPTION - PROGRESSION: CLINICAL_PROGRESSION: GRADUALLY WORSENING

## 2022-02-18 ASSESSMENT — PAIN DESCRIPTION - ORIENTATION: ORIENTATION: LEFT

## 2022-02-18 NOTE — PLAN OF CARE
Wound debrided and patient tolerated well. Some improvement noted in coccyx wound. Will follow up in 2 weeks. Discharge instructions reviewed with patient, all questions answered, copy given to patient. Dressings were applied to all wounds per M.D. Instructions at this visit.

## 2022-02-18 NOTE — PROGRESS NOTES
[address] [facility] [place] [facility] Physician Orders and Discharge [address]  [address], [address]  [place], [facility]  Telephone: [phone]      Fax: [phone] home care Madiha Escobedo  Nurse Serinadigna Barnhart [phone]      Your wound-care supplies will be provided by: [facility] AT [facility] orders supplies through [employer]. Please note, depending on your insurance coverage, you may have out-of-pocket expenses for these supplies. Someone from [place] may call you to confirm your order and discuss those potential costs before they ship your products -- please anticipate that call. If your out-of-pocket cost is going to be less than $200, and [facility] cannot reach you, they may ship your supplies as soon as the order is processed, and will send you a bill. If your out-of-pocket cost is going to be more than $200, [facility] should not ship your supplies until they speak with you. If you have any questions about your supplies or your potential out-of-pocket costs, or if you need to place an order for a refill of supplies (typically monthly), Letitia Juarez is your local representative, and can be reached at [phone]. Information on [facility]'s return policy will also be enclosed with your supplies -- please read that carefully before opening your items.      NAME:  Gris Taveras   DATE of BIRTH:  1950  PRIMARY DIAGNOSIS FOR WOUND CARE CENTER:  Pressure ulcer     Wound cleansing:   Do not scrub or use excessive force. Wash hands with soap and water before and after dressing changes. Prior to applying a clean dressing, cleanse wound with normal saline, wound cleanser, or mild soap and water.  Ask your physician or nurse before getting the wound(s) wet in the shower.                Wound care for home:        Left Medial Heel, Deep tissue injury:  Mix Triad and Antibiotic ointment and apply to wound (if wound is more dry add more antibiotic ointment and if wound is draining more add more Triad)  Cover with a dry dressing  Change every day     Sacral Wound:   Vashe or Hypochlorous acid soaked gauze applied to wound for 2-3 minutes, no need to rinse  Triad or Zinc Oxide to maegan-wound  Multidex Powder then Collagen with silver to wound bed & tuck into depth of wound  Fluff gauze over wound  Cover with small sacral mepilex border  HHC to change dressing on Monday, Wednesday, Friday next week        Please note, all wounds (unless stated otherwise here) were mechanically debrided at the time of cleansing here in the wound-care center today, so a small amount of pain, drainage or bleeding from that process might be expected, and is normal.      All products for home use, including multiple products for a single wound if applicable, are medically necessary in order to achieve the best chance at timely wound healing. See provider documentation for details if needed.     Substituted dressings applied in the [facility] today, if applicable:      [facility]     New orders for this week (labs, imaging, medications, etc.):            Additional instructions for specific diagnoses:     +ROHO cushion- use at [address] when sitting!!  +True Balance Air group II mattress      General comments for pressure ulcers:  *  Make sure you stay well-hydrated, and maintain good protein intake. *  Reposition at least every two hours, to keep from having pressure in one spot for too long. *  If you are not sure whether you have the best offloading surfaces (mattress, mattress overlay, chair cushion, heel-protector boots, etc), please ask. *  Moisturize your skin regularly with Vaseline, Aquaphor, Aveeno, CeraVe, Cetaphil, Eucerin, Lubriderm, etc; but keep the skin between your toes dry.   *  If you smoke, your wound can not heal properly -- please talk with us when you're ready to quit.         F/U Appointment is with Dr. Cindy Grubbs in 2 weeks on [facility]                       .     Your nurse  is ABRAHAM Martinez      If we applied slip-resistant hospital socks today, be sure to remove them at least once a day to inspect your toes or feet, even if you're not changing the wraps or dressings underneath.  If you see anything concerning (redness, excess moisture, etc), please call and let us know right away.     Should you experience any significant changes in your wound(s) (including redness, increased warmth, increased pain, increased drainage, odor, or fever) or have questions about your wound care, please contact the [address] [place] [address] at [phone] Monday-Thursday from 8:00 am - 4:30 pm, or Friday from 8:00 am - 2:30 pm.  If you need help with your wound outside these hours and cannot wait until we are again available, contact your home-care company (if applicable), your PCP, or go to the nearest emergency room

## 2022-02-19 PROBLEM — L89.222 PRESSURE ULCER OF LEFT HIP, STAGE 2 (HCC): Status: RESOLVED | Noted: 2022-01-12 | Resolved: 2022-02-19

## 2022-02-19 PROBLEM — L89.623 PRESSURE ULCER OF LEFT HEEL, STAGE 3 (HCC): Status: ACTIVE | Noted: 2022-01-12

## 2022-02-19 NOTE — PROGRESS NOTES
[address] [facility] Progress Note    Rossana Fabian     : 1950    DATE OF VISIT:  2022    Subjective:     Rossana Fabian is a 80 y.o. female who has a pressure ulcer located on the sacrum and foot (left , heel). Significant symptoms or pertinent wound history since last visit: feeling ok overall; no fever, eating well. Was concerned a few days ago about increasing LE swelling, some left foot discomfort and color change, but she had been up on her feet more for a day, and a photo from her family just looked like increased edema and stasis color changes - with some more rest, elevation and her compression socks this week, things have improved some. Additional ulcer(s) noted? no.      Her current medication list consists of Acapella, Insulin Syringe-Needle U-100, Roflumilast, Teriparatide (Recombinant), albuterol sulfate HFA, aspirin EC, atorvastatin, azithromycin, blood glucose test strips, calcium carbonate, cetirizine, clopidogrel, docusate sodium, fluticasone, gabapentin, insulin glargine, insulin lispro, ipratropium, latanoprost, metoprolol succinate, midodrine, morphine, naloxone, omeprazole, ondansetron, oxyCODONE-acetaminophen, predniSONE, spironolactone, torsemide, and vitamin D. Allergies: Atenolol    Objective:     Vitals:    22 1118   BP: 126/72   Pulse: 86   Resp: 18   Temp: 98.6 °F (37 °C)   TempSrc: Oral   Weight: 148 lb 8 oz (67.4 kg)   Height: 5' 8\" (1.727 m)     AAOx3, fatigued, NAD  No cellulitis, angitis, fluctuance  No acute arthritis or bursitis  Mild-mod LE edema, better than the photo from days ago  No contact dermatitis or cutaneous Candidiasis  Blanca-ulcer skin: indurated, pink, warm and dry, less callus and hyperkeratosis at heel.    Ulcer(s):  sacrum is about the same size, slowly a bit more pink and red, a bit less fibrotic, some early new granulation, some fibrin and biofilm, less depth, one or two edges with better epidermal tissue; heel ulcer more stagnant than I expected, pale tan-yellow, fibronecrotic, focally into fat tissue, some fibrin and biofilm. Photos also saved in electronic chart.     Today's wound measurements, per RN documentation:  Wound 12/20/21 #5, Left Heel, Pressure, Stage 3 (onset 12/10/2021)-Wound Length (cm): 2 cm  Wound 12/18/20 #2, Sacrum, Pressure Injury, Stage 4, Onset 5/2020-Wound Length (cm): 2.2 cm    Wound 12/20/21 #5, Left Heel, Pressure, Stage 3 (onset 12/10/2021)-Wound Width (cm): 1 cm  Wound 12/18/20 #2, Sacrum, Pressure Injury, Stage 4, Onset 5/2020-Wound Width (cm): 2 cm    Wound 12/20/21 #5, Left Heel, Pressure, Stage 3 (onset 12/10/2021)-Wound Depth (cm): 0.1 cm  Wound 12/18/20 #2, Sacrum, Pressure Injury, Stage 4, Onset 5/2020-Wound Depth (cm): 0.5 cm    Assessment:     Patient Active Problem List   Diagnosis Code    Rheumatoid arthritis ([facility] [address] M06.9    Psoriasis L40.9    GERD (gastroesophageal reflux disease) K21.9    Hyponatremia E87.1    Anemia D64.9    Cylindrical bronchiectasis ([facility]) J47.9    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    Immunocompromised state ([facility] [address] D84.9    Coronary artery disease I25.10    Bilateral lower extremity edema R60.0    Essential hypertension I10    Lumbar spondylosis M47.816    Mitral valve insufficiency and aortic valve insufficiency I08.0    Mixed hyperlipidemia E78.2    Myopia of both eyes H52.13    Osteoporosis M81.0    Other chronic sinusitis J32.8    Primary open angle glaucoma (POAG) of both eyes, mild stage H40.1131    Primary osteoarthritis of right hip M16.11    Type 2 diabetes mellitus with unspecified diabetic retinopathy without macular edema ([facility]) E11.319    Type 2 diabetes mellitus with diabetic peripheral angiopathy without gangrene, with long-term current use of insulin (HCC) E11.51, Z79.4    Pressure ulcer of coccygeal region, stage 4 (HCC) L89.154    Hx of CABG Z95.1    Mild malnutrition ([facility]) E44.1    Chronic diastolic congestive heart failure ([facility] [address] I50.32    Chronic obstructive pulmonary disease (HCC) J44.9    Nonrheumatic mitral (valve) insufficiency I34.0    Acute respiratory failure with hypoxia (HCC) J96.01    COVID-19 U07.1    Acute on chronic respiratory failure with hypoxia (HCC) J96.21    HCAP (healthcare-associated pneumonia) J18.9    COVID U07.1    Pneumonia due to infectious organism J18.9    Hypokalemia E87.6    Pressure ulcer of left heel, stage 3 (HCC) U86.834       Assessment of today's active condition(s): RA, decreased mobility, longstanding and slowly healing stage 4 sacral ulcer, recent heel pressure ulcer - no signs of infection. Factors contributing to occurrence and/or persistence of the chronic ulcer include chronic pressure, decreased mobility, shear force and immunosuppression. Medical necessity of today's visit is shown by the above documentation. Sharp debridement is indicated today, based upon the exam findings in the wound(s) above. Procedure note:     Consent obtained. Time out performed per [facility]. Anesthetic  Anesthetic: 4% Lidocaine Cream     Using a curette and #15 blade scalpel, I sharply debrided the sacrum and foot (left , heel) ulcer(s) down through and including the removal of subcutaneous tissue. The type(s) of tissue debrided included fibrin, biofilm and necrotic/eschar. Total Surface Area Debrided: 6 sq cm. The ulcers were then irrigated with normal saline solution. The procedure was completed with a small amount of bleeding, and hemostasis was with pressure. The patient tolerated the procedure well, with no significant complications. The patient's level of pain during and after the procedure was monitored. Post-debridement measurements, if different from pre-debridement, are in the flowsheet as well.     Discharge plan:     Treatment in the wound care center today, per RN documentation: Wound 12/20/21 #5, Left Heel, Pressure, Stage 3 (onset 12/10/2021)-Dressing/Treatment: Other (comment) (Triad/PSO, 4x4's, kerlix)  Wound 12/18/20 #2, Sacrum, Pressure Injury, Stage 4, Onset 5/2020-Dressing/Treatment: Other (comment) (Triad-maegan, Collagen, fluffed gauzed, sacral border). Home treatment: good handwashing before and after any dressing changes. Cleanse wound with saline or soap & water before dressing change. May use Vaseline (petrolatum), Aquaphor, Aveeno, CeraVe, Cetaphil, Eucerin, Lubriderm, etc for dry skin. Dressing type for home: as above (with cover dressings as available), three times weekly. Written discharge instructions given to patient. Follow up in 2 weeks, as things seem to be a bit more stable and predictable this week.     Electronically signed by Dell Braker MD on 2/19/2022 at 2:00 PM.

## 2022-02-19 NOTE — PROGRESS NOTES
[facility] Progress Note    Yelitza Tejeda     : 1950    DATE OF VISIT:  2022    Subjective:     Yelitza Tejeda is a 80 y.o. female who has a pressure ulcer located on the sacrum and foot (left , heel). Significant symptoms or pertinent wound history since last visit: feeling ok, a bit of foot pain, fatigued, no fever, eating well. Additional ulcer(s) noted? no.      Her current medication list consists of Acapella, Insulin Syringe-Needle U-100, Roflumilast, Teriparatide (Recombinant), albuterol sulfate HFA, aspirin EC, atorvastatin, azithromycin, blood glucose test strips, calcium carbonate, cetirizine, clopidogrel, docusate sodium, fluticasone, gabapentin, insulin glargine, insulin lispro, ipratropium, latanoprost, metoprolol succinate, midodrine, morphine, naloxone, omeprazole, ondansetron, oxyCODONE-acetaminophen, predniSONE, spironolactone, torsemide, and vitamin D. Allergies: Atenolol    Objective:     Vitals:    22 0932   BP: 124/70   Pulse: 96   Resp: 16   Temp: 99.4 °F (37.4 °C)   TempSrc: Oral   Weight: 144 lb 6.4 oz (65.5 kg)   Height: 5' 8\" (1.727 m)     AAOx3, fatigued, NAD  No cellulitis, angitis, fluctuance  No acute arthritis or bursitis  No contact dermatitis or cutaneous Candidiasis  Blanca-ulcer skin: indurated, pink, warm and dry, some hyperkeratosis at heel. Ulcer(s):  sacrum is about the same size, just a bit more pink and red, a bit less fibrotic, some early new granulation, some fibrin and biofilm, still a couple of pockets of depth; right hip remains healed; heel eschar lysed away, wound bed mostly fibrotic, still seems fairly superficial, just some fibrin and biofilm, no tunnels, no signs of infection. Photos also saved in electronic chart.     Today's wound measurements, per RN documentation:  Wound 21 #5, Left Heel, Pressure, Stage 3 (onset 12/10/2021)-Wound Length (cm): 1.8 cm  Wound 20 #2, Sacrum, Pressure Injury, Stage 4, Onset 5/2020-Wound Length (cm): 2.7 cm    Wound 12/20/21 #5, Left Heel, Pressure, Stage 3 (onset 12/10/2021)-Wound Width (cm): 1 cm  Wound 12/18/20 #2, Sacrum, Pressure Injury, Stage 4, Onset 5/2020-Wound Width (cm): 2 cm    Wound 12/20/21 #5, Left Heel, Pressure, Stage 3 (onset 12/10/2021)-Wound Depth (cm): 0.2 cm  Wound 12/18/20 #2, Sacrum, Pressure Injury, Stage 4, Onset 5/2020-Wound Depth (cm): 1.2 cm    Assessment:     Patient Active Problem List   Diagnosis Code    Rheumatoid arthritis ([facility] [address] M06.9    Psoriasis L40.9    GERD (gastroesophageal reflux disease) K21.9    Hyponatremia E87.1    Anemia D64.9    Cylindrical bronchiectasis (HCC) J47.9    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    Immunocompromised state ([facility] [address] D84.9    Coronary artery disease I25.10    Bilateral lower extremity edema R60.0    Essential hypertension I10    Lumbar spondylosis M47.816    Mitral valve insufficiency and aortic valve insufficiency I08.0    Mixed hyperlipidemia E78.2    Myopia of both eyes H52.13    Osteoporosis M81.0    Other chronic sinusitis J32.8    Primary open angle glaucoma (POAG) of both eyes, mild stage H40.1131    Primary osteoarthritis of right hip M16.11    Type 2 diabetes mellitus with unspecified diabetic retinopathy without macular edema ([facility]) E11.319    Type 2 diabetes mellitus with diabetic peripheral angiopathy without gangrene, with long-term current use of insulin ([facility]) E11.51, Z79.4    Pressure ulcer of coccygeal region, stage 4 ([facility]) L89.154    Hx of CABG Z95.1    Mild malnutrition ([facility]) E44.1    Chronic diastolic congestive heart failure (HCC) I50.32    Chronic obstructive pulmonary disease (HCC) J44.9    Nonrheumatic mitral (valve) insufficiency I34.0    Acute respiratory failure with hypoxia ([facility]) J96.01    COVID-19 U07.1    Acute on chronic respiratory failure with hypoxia (HCC) J96.21    HCAP (healthcare-associated pneumonia) J18.9    COVID U07.1    Pneumonia due to infectious organism J18.9    Hypokalemia E87.6    Unstageable pressure ulcer of left heel ([facility]) L89.620       Assessment of today's active condition(s): RA, decreased mobility, longstanding stage 4 sacral ulcer, no infection, doing reasonably well with conservative Rx. also a more recent heel pressure ulcer, eschar now gone. Factors contributing to occurrence and/or persistence of the chronic ulcer include chronic pressure, decreased mobility, shear force and immunosuppression. Medical necessity of today's visit is shown by the above documentation. Sharp debridement is indicated today, based upon the exam findings in the wound(s) above. Procedure note:     Consent obtained. Time out performed per [facility]. Anesthetic  Anesthetic: 4% Lidocaine Cream     Using a curette, I sharply debrided the foot (left , heel) ulcer(s) down through and including the removal of dermis. The type(s) of tissue debrided included fibrin and biofilm. Total Surface Area Debrided: 2 sq cm. Using a curette, I sharply debrided the sacrum ulcer(s) down through and including the removal of subcutaneous tissue. The type(s) of tissue debrided included fibrin, biofilm and necrotic/eschar. Total Surface Area Debrided: 5 sq cm. The ulcers were then irrigated with normal saline solution. The procedure was completed with a small amount of bleeding, and hemostasis was with pressure. The patient tolerated the procedure well, with no significant complications. The patient's level of pain during and after the procedure was monitored. Post-debridement measurements, if different from pre-debridement, are in the flowsheet as well.     Discharge plan:     Treatment in the wound care center today, per RN documentation: Wound 12/20/21 #5, Left Heel, Pressure, Stage 3 (onset 12/10/2021)-Dressing/Treatment:  (triad/PSO, mepilex border- HEEL)  Wound 12/18/20 #2, Sacrum, Pressure Injury, Stage 4, Onset 5/2020-Dressing/Treatment:  (Collagen, Triad(maegan), gauze, mepilex border). Home treatment: good handwashing before and after any dressing changes. Cleanse wound with saline or soap & water before dressing change. May use Vaseline (petrolatum), Aquaphor, Aveeno, CeraVe, Cetaphil, Eucerin, Lubriderm, etc for dry skin. Dressing type for home: as above (with different cover dressings as available), three times weekly. Written discharge instructions given to patient. Follow up in 1 week.     Electronically signed by Alicia Peralta MD on 2/19/2022 at 1:46 PM.

## 2022-02-21 NOTE — PROGRESS NOTES
[address] [facility]   Cardiovascular Evaluation    PATIENT: Luis Hannah  DATE: 2022  MRN: [id number]  CSN: [id number]  : 1950      Primary Care Doctor: Ej Hanson MD  Reason for evaluation:   No chief complaint on file. follow up for CAD. Subjective:   History of present illness on initial date of evaluation:   Luis Hannah is a 80 y.o. patient with a history of CAD with NSTEMI, anemia, HLD and DM who presents for follow up. She had right fem-pop bypass 2017. She present to the ER on 21 with fatigue and chest pain. Her echo from 21 showed her EF was 55%. Mild MR and TR. Her cardiac cath from 21 showed severe MV CAD. She underwent CABG x3 with BIMAL obliteration with VICKY Romero on 21. On 05/10/21 she underwent mediastinal exploration and evacuation of Hematoma with DR Rocha. At her last visit she was referred to cardiac rehab. She was admitted 8/10/2021 for shortness of breath and treated for CHF. Echo showed EF 50%, possibly severe MR. 5 [facility] 2021 showed occluded SVG-OM, suspect severe MR due to papillary dysfunction r/t occluded RCA/LCx. A limited echocardiogram from 2021 showed an EF of 30%, moderate mitral reg. Last OV 10/21/2021, stated SOB started prior to her last cath and has decreased some since her procedure. She has occasional sharp quick chest pain at night. She stated she has leg swelling if she does not wear her compression stockings. She has gained a few pounds over the last few days. She is currently wearing a wound Vac for a wound on her bottom and additional ulcer at her left leg vein harvest site- is following with wound care- Dr. Arun Carbajal. He has jonnathan having dizziness if she reaches above her head to get something out of her kitchen cabinets. Today she states she is doing well. She had COVID in 2021.  She says her heart feels back to normal. She states she has jonnathan eating more since getting her appetite back after having COVID. She complains of swelling in the legs. Patient denies current chest pain, sob, palpitations, dizziness or syncope. Patient is taking all cardiac medications as prescribed and tolerates them well.              Patient Active Problem List   Diagnosis    Rheumatoid arthritis ([address]    Psoriasis    GERD (gastroesophageal reflux disease)    Hyponatremia    Anemia    Cylindrical bronchiectasis (HCC)    Tracheobronchomalacia    Immunocompromised state ([address]    Coronary artery disease    Bilateral lower extremity edema    Essential hypertension    Lumbar spondylosis    Mitral valve insufficiency and aortic valve insufficiency    Mixed hyperlipidemia    Myopia of both eyes    Osteoporosis    Other chronic sinusitis    Primary open angle glaucoma (POAG) of both eyes, mild stage    Primary osteoarthritis of right hip    Type 2 diabetes mellitus with unspecified diabetic retinopathy without macular edema (HCC)    Type 2 diabetes mellitus with diabetic peripheral angiopathy without gangrene, with long-term current use of insulin (HCC)    Pressure ulcer of coccygeal region, stage 4 (HCC)    Hx of CABG    Mild malnutrition (HCC)    Chronic diastolic congestive heart failure (HCC)    Chronic obstructive pulmonary disease (HCC)    Nonrheumatic mitral (valve) insufficiency    Acute respiratory failure with hypoxia (HCC)    COVID-19    Acute on chronic respiratory failure with hypoxia (HCC)    HCAP (healthcare-associated pneumonia)    COVID    Pneumonia due to infectious organism    Hypokalemia    Pressure ulcer of left heel, stage 3 (HCC)         Past Medical History:   has a past medical history of Acute on chronic diastolic heart failure due to coronary artery disease ([address] Acute respiratory failure with hypoxia ([address] Atherosclerosis of native artery of right lower extremity with rest pain ([address] Back pain, Branch retinal vein occlusion, Bronchiectasis with acute exacerbation [facility]), Cellulitis and abscess of left leg, Cellulitis of left lower extremity, CHF (congestive heart failure) (HCC), Closed compression fracture of thoracic vertebra (HCC), Closed fracture of facial bone with routine healing, Closed jaw fracture ([address] Community acquired pneumonia of left lower lobe of lung, Compression fracture of L1 lumbar vertebra (HCC), COPD (chronic obstructive pulmonary disease) ([address] COVID, Fracture of tibial plateau, closed, left, initial encounter, Minimally displaced zone I fracture of sacrum ([address] MRSA (methicillin resistant staph aureus) culture positive, MRSA (methicillin resistant Staphylococcus aureus), Mucus plugging of bronchi, NSTEMI (non-ST elevated myocardial infarction) ([address] Osteomyelitis of mandible, Osteoporosis with pathological fracture, Post herpetic neuralgia, Proximal humerus fracture, Rheumatoid arthritis ([address] Shingles, Sleep apnea, Status post incision and drainage, Surgical wound dehiscence, initial encounter (at [facility] SVG harvest site), Temporal arteritis ([address] Tobacco use, Tracheomalacia, and Vitreous hemorrhage, right eye ([address] Surgical History:   has a past surgical history that includes hernia repair; Mandible fracture surgery; Foot surgery; Elbow surgery; Cataract removal; Tubal ligation; Knee arthroscopy; Kyphosis surgery; laminectomy; Spinal fusion; Septoplasty (05/07/2013); Artery surgery (05/30/2013); Upper gastrointestinal endoscopy (04/08/2014); bronchoscopy; bronchoscopy (N/A, 06/12/2019); Mandible fracture surgery (02/2020); back surgery (08/2020); other surgical history (01/08/2021); Pressure ulcer debridement (N/A, 01/08/2021); Artery Biopsy (Right, 03/01/2021); Coronary artery bypass graft (N/A, 05/03/2021); Mediastinoscopy (N/A, 05/05/2021); Cardiac surgery (05/03/2021); Leg Surgery (Left, 7/13/2021); IR MIDLINE CATH (7/14/2021); transesophageal echocardiogram (11/02/2021); and Colonoscopy.      Social History:   reports that she quit smoking about 30 years ago. Her smoking use included cigarettes. She has a 20.00 pack-year smoking history. She has never used smokeless tobacco. She reports previous alcohol use. She reports that she does not use drugs. Family History:  No evidence for sudden cardiac death or premature CAD    Home Medications:  Reviewed and are listed in nursing record. and/or listed below  Current Outpatient Medications   Medication Sig Dispense Refill    azithromycin (ZITHROMAX) 250 MG tablet Take 1 tablet by mouth daily 90 tablet 3    predniSONE (DELTASONE) 1 MG tablet Take 4 mg by mouth daily      ondansetron (ZOFRAN) 4 MG tablet every 8 hours as needed       oxyCODONE-acetaminophen (PERCOCET)  MG per tablet daily as needed.  insulin glargine (LANTUS) 100 UNIT/ML injection vial Inject 30 Units into the skin nightly 1 pen 0    predniSONE (DELTASONE) 20 MG tablet 1 1/2 qd- 3 days,1 qd- 3 days,1/2 qd- 3 days 9 tablet 0    midodrine (PROAMATINE) 5 MG tablet Take 1 tablet by mouth 3 times daily 90 tablet 0    torsemide (DEMADEX) 20 MG tablet Take 2 tablets by mouth daily If weight 131 lbs or less, decrease to 20 mg daily 60 tablet 5    metoprolol succinate (TOPROL XL) 25 MG extended release tablet Take 0.5 tablets by mouth daily 45 tablet 3    spironolactone (ALDACTONE) 25 MG tablet Take 1 tablet by mouth daily 90 tablet 3    DALIRESP 500 MCG tablet TAKE 1 TABLET BY MOUTH DAILY 30 tablet 11    clopidogrel (PLAVIX) 75 MG tablet Take 1 tablet by mouth daily 30 tablet 11    docusate sodium (COLACE) 100 MG capsule Take 100 mg by mouth 2 times daily as needed for Constipation      gabapentin (NEURONTIN) 300 MG capsule Take 300 mg by mouth 2 times daily.       latanoprost (XALATAN) 0.005 % ophthalmic solution Place 1 drop into both eyes nightly      Teriparatide, Recombinant, (FORTEO) 600 MCG/2.4ML SOPN injection Inject 20 mcg into the skin daily      NARCAN 4 MG/0.1ML LIQD nasal spray as needed       morphine (MS CONTIN) 15 MG extended release tablet 15 mg 2 times daily.  ipratropium (ATROVENT) 0.06 % nasal spray USE 2 SPRAYS BY NASAL ROUTE 2-4 TIMES DAILY (Patient taking differently: 3 times daily as needed ) 1 Bottle 5    albuterol sulfate  (90 Base) MCG/ACT inhaler INHALE 2 PUFFS INTO THE LUNGS EVERY 4 HOURS AS NEEDED FOR WHEEZING 1 Inhaler 5    vitamin D (CHOLECALCIFEROL) 1000 UNIT TABS tablet Take 5,000 Units by mouth daily       calcium carbonate (OSCAL) 500 MG TABS tablet Take 500 mg by mouth daily      atorvastatin (LIPITOR) 40 MG tablet Take 40 mg by mouth      Insulin Syringe-Needle U-100 31G X 5/16\" 0.5 ML MISC USE 5 TIMES DAILY      ACCU-CHEK CEDRICK PLUS strip TEST 4 TIMES DAILY  3    aspirin EC 81 MG EC tablet Take 1 tablet by mouth daily      insulin lispro (HUMALOG) 100 UNIT/ML injection vial Inject 0-12 Units into the skin 3 times daily (with meals)      Misc. Devices (ACAPELLA) MISC Take 1 Device by mouth as needed 1 each 0    fluticasone (FLONASE) 50 MCG/ACT nasal spray INHALE 2 SPRAYS IN EACH NOSTRIL DAILY 1 Bottle 5    cetirizine (ZYRTEC) 10 MG tablet Take 10 mg by mouth daily.  omeprazole (PRILOSEC) 40 MG capsule Take 40 mg by mouth daily        No current facility-administered medications for this visit. Allergies:  Atenolol     Review of Systems:   A 14 point review of symptoms completed. Pertinent positives identified in the HPI, all other review of symptoms negative as below. Objective:   PHYSICAL EXAM:    There were no vitals filed for this visit.        Wt Readings from Last 3 Encounters:   02/18/22 148 lb 8 oz (67.4 kg)   02/11/22 144 lb 6.4 oz (65.5 kg)   01/14/22 142 lb (64.4 kg)         General Appearance:  Alert, cooperative, no distress, appears stated age   Head:  Normocephalic, atraumatic   Eyes:  PERRL, conjunctiva/corneas clear   Nose: Nares normal, no drainage or sinus tenderness   Throat: Lips, mucosa, and tongue normal   Neck: Supple, symmetrical, trachea midline, NL thyroid no carotid bruit or JVD   Lungs:   CTAB, respirations unlabored, well-healed surgical scar   Chest Wall:  No tenderness or deformity   Heart:  Regular rhythm and normal rate; S1, S2 are normal;   no murmur noted; no rub or gallop   Abdomen:   Soft, non-tender, +BS x 4, no masses, no organomegaly   Extremities: Extremities normal, atraumatic, no cyanosis 1+ BLE pitting R>L edema   Pulses: 2+ and symmetric   Skin: Skin color, texture, turgor normal, no rashes or lesions   Pysch: Normal mood and affect   Neurologic: Normal gross motor and sensory exam.         LABS   CBC:      Lab Results   Component Value Date    WBC 7.7 01/05/2022    RBC 3.33 01/05/2022    HGB 10.4 01/05/2022    HCT 31.0 01/05/2022    MCV 93.2 01/05/2022    RDW 15.2 01/05/2022     01/05/2022     CMP:  Lab Results   Component Value Date     01/05/2022    K 5.4 01/05/2022    K 4.1 12/21/2021    CL 98 01/05/2022    CO2 24 01/05/2022    BUN 26 01/05/2022    CREATININE 0.8 01/05/2022    GFRAA >60 01/05/2022    GFRAA >60 05/07/2013    AGRATIO 0.9 01/05/2022    LABGLOM >60 01/05/2022    GLUCOSE 210 01/05/2022    PROT 6.5 01/05/2022    PROT 7.1 03/21/2013    CALCIUM 8.8 01/05/2022    BILITOT 0.6 01/05/2022    ALKPHOS 140 01/05/2022    AST 22 01/05/2022    ALT 26 01/05/2022     PT/INR:   No results found for: PTINR  Liver:  No components found for: CHLPL  Lab Results   Component Value Date    ALT 26 01/05/2022    AST 22 01/05/2022    ALKPHOS 140 (H) 01/05/2022    BILITOT 0.6 01/05/2022     Lab Results   Component Value Date    LABA1C 7.7 12/20/2021     Lipids:         Lab Results   Component Value Date    TRIG 85 10/29/2021    TRIG 97 08/11/2021    TRIG 64 05/03/2021            Lab Results   Component Value Date    HDL 34 (L) 10/29/2021    HDL 34 (L) 08/11/2021    HDL 38 (L) 05/03/2021            Lab Results   Component Value Date    LDLCALC 61 10/29/2021    LDLCALC 54 08/11/2021    LDLCALC 35 05/03/2021            Lab Results   Component Value Date    LABVLDL 17 10/29/2021    LABVLDL 19 2021    LABVLDL 13 2021         CARDIAC DATA   EKG 2021  Sinus rhythm with occasional Premature ventricular complexes  Possible Left atrial enlargement  Non-specific intra-ventricular conduction delay  Nonspecific ST and T wave abnormality  Abnormal ECG    EK21  SR HR 85    ECHO Transesophageal 10/29/2021   Summary   -- Ejection fraction is visually estimated to be 50-55 %. -- Mild mitral regurgitation. -- The left atrium is mildly dilated. There is no evidence of mass or   thrombus in the left atrium or appendage. -- Bubble study was performed and showed grade I pulmonary arteriovenous   malformation ( < 30 bubbles in left ventricle after 3rd cardiac cycle). Moderate layered plaque is noted in the ascending aorta and arch. Limited echocardiogram 2021  Conclusions   Summary   Limited exam for mitral valve regurgitation. Moderate mitral regurgitation. Compared to exam done 21 mitral valve regurgitation has decreased. Severe left ventricular dysfunction EF < 30%       ECHO/MUGA: 21  Normal left ventricle systolic function with an estimated ejection fraction   of 55%. No regional wall motion abnormalities are seen. Normal left ventricular diastolic filling pressure. Mild eccentric aortic regurgitation. Mild mitral and tricuspid regurgitation. Systolic pulmonary artery pressure (SPAP) is normal and estimated at 27 mmHg   (right atrial pressure 3 mmHg).       STRESS TEST:    Cath:[facility] 10/7/2016  This is a right dominant coronary arterial system    Left Main coronary artery: distal 30-40% lesion  Left anterior descending coronary artery: ostial 30-40% lesion  Left circumflex coronary artery: Heavily calcified proximal   vessel with ostial 50-60% lesion, mid 50-60% lesion, OM2: normal   caliber vessel, heavily calcified with proximal 50-60% lesion,   OM3: 30-40% proximal lesion  Right coronary artery: heavily calcified vessel, minimal luminal   irregularities, RPDA: small < 2mm caliber vessel with 100%   proximal occlusion and grade II R to R collaterals from an RV   marginal.  Left ventriculogram: Normal wall motion, LVEF = 55%  LVEDP: 4 mmHg  Aortic pressure: 90/50 mmHg    Impression:   2 Vessel CAD  Normal LV function  Normal LVEDP  Normal systemic pressures     CARDIAC CATH: 21  Procedure Findings:  1. Severe multi vessel coronary artery diease              ~not amenable to PCI  2. Normal left ventricular function with EF estimated at 55-60%  3. Normal left heart hemodynamics    CAB21 DR Dariel Hannah  OPERATION PERFORMED:  1. Urgent coronary bypass grafting surgery x3 with single greater  saphenous vein graft to the posterior ventricular branch of the right  coronary artery, separate single greater saphenous vein graft to the  second obtuse marginal branch of circumflex,  pedicled left internal artery to the LAD. 2.  Left atrial appendage obliteration with 40 mm AtriCure left atrial  clip. 3.  Cardiopulmonary bypass. 4.  Endoscopic vein harvest of the left greater saphenous vein. 5.  Transesophageal echo. 6.  Epiaortic ultrasound. 7.  Doppler verification of grafts. 8.  Bilateral five-level intercostal nerve block with Exparel. 9.  Platelet gel application. 10.  Sternal plating. 05/10/21 DR Rocha  OPERATION PERFORMED:  Mediastinal exploration and evacuation of  Hematoma.       Left heart cath Dr. Luana Hernandez 2021  Procedure Note     Procedure:          [facility] with grafts  Indication:          UA, wma, depressed EF, severe MR     Procedure Details:  Consent Access Bleed R Sedat Start Stop Versed Fentanyl Contrast Fluoro EBL Comp Spec   Yes RCFA int MCSFC 1448 1541 1.5 75 195 12.5 <20 None None   *Sedation Note: MCSFC = minimal conscious sedation for comfort     Findings:  Artery Findings/Result   LM Normal   LAD Mid 100% after large diagonal, distal supplied by LIMA   Cx 99% ostial, distal supplied by R-L collaterals   RI N/A   RCA 70% distal, % prox   S-O occluded   S-PLB patent   L-LAD  patent   LVEDP 8   LVG NA due to contrast      Intervention:         None     Post Cath Dx:       Severe native disease as above  Occluded S-OM  Suspect severe MR related to papillary dysfunction related to occluded distal RCA and Cx  Consider MVR when wound issues resolve if MR does not improved with medical therapy  Would followup with Dr. Richard Marcos        VASCULAR/OTHER IMAGING:      Assessment and Plan   Willie Barnhart is a 80 y.o. female who presents today for the following problems:      1. CAD:    - 5/10/2021 3V CABG   - 8/2021 closure of SVG-OM  2. Mitral regurg: mild (on ABHISHEK)  3.  Bilateral lower extremity edema   R>L due to vein harvesting  4. Mixed ischemic/nonischemic systolic cardiomyopathy: Improving    LVEF <30%  5. History of Covid 2021      MD Plan:  1. She is feeling much better following Covid resolution and medical treatment of her cardiac condition  2. Reviewed last ABHISHEK, I think I was over generous and calling her EF as I do not believe she is made a full recovery    -I will update a transthoracic echo for interval MR, LVEF and filling pressure  3. She has significant bruising that is bothering her so I will discontinue her aspirin and continue Plavix monotherapy  4. Was prescribed Midodrine during last hospitalization discussed weaning off as blood pressure tolerates. Okay systolic blood pressures in the 90s given her heart dysfunction as long she is asymptomatic  5. Continue Lipitor, Plavix, Toprol, Aldactone, torsemide  6. Does have some edema in the legs I discussed elevating her legs above her heart until her compression stockings arrive.  Would increase torsemide to 40 mg for 2 to 3 days to help and return to 20 mg after    MDM: mod    Patient Active Problem List   Diagnosis    Rheumatoid arthritis (HCC)    Psoriasis    GERD (gastroesophageal reflux disease)    Hyponatremia    Anemia    Cylindrical bronchiectasis (HCC)    Tracheobronchomalacia    Immunocompromised state ([address]    Coronary artery disease    Bilateral lower extremity edema    Essential hypertension    Lumbar spondylosis    Mitral valve insufficiency and aortic valve insufficiency    Mixed hyperlipidemia    Myopia of both eyes    Osteoporosis    Other chronic sinusitis    Primary open angle glaucoma (POAG) of both eyes, mild stage    Primary osteoarthritis of right hip    Type 2 diabetes mellitus with unspecified diabetic retinopathy without macular edema (HCC)    Type 2 diabetes mellitus with diabetic peripheral angiopathy without gangrene, with long-term current use of insulin (HCC)    Pressure ulcer of coccygeal region, stage 4 (HCC)    Hx of CABG    Mild malnutrition (HCC)    Chronic diastolic congestive heart failure (HCC)    Chronic obstructive pulmonary disease (HCC)    Nonrheumatic mitral (valve) insufficiency    Acute respiratory failure with hypoxia (HCC)    COVID-19    Acute on chronic respiratory failure with hypoxia (HCC)    HCAP (healthcare-associated pneumonia)    COVID    Pneumonia due to infectious organism    Hypokalemia    Pressure ulcer of left heel, stage 3 ([address]       Patient Plan:  1. Order limited echocardiogram of heart to assess heart function   -  I will call with results  2. Recommend compressions stockings below the knees to decrease swelling  3. Elevate your legs above the heart to decrease swelling  4. Increase your torsemide for no longer than 3 days to decrease swelling  5. Decrease midodrine to two times a day   - If your blood pressure drops below 90 on the top number or you get  lightheaded take midodrine three times a day  6. You can hold Aspirin 81 mg to help decrease bruising   7. Continue Plavix 75 mg  8.  Follow up in 6 months    This note was scribed in the presence of Arnold Pruett MD by Vivienne Kapoor RN.        Shakira STEEL MD, personally performed the services described in this documentation as scribed by the above signed scribe in my presence, and it is both accurate and complete to the best of our ability and knowledge. I agree with the details independently gathered by my clinical support staff, while the remaining scribed note accurately describes my personal service to the patient. The above RN is working as a scribe for and in the presence of myself . Working as a scribe, the RN may have prepopulated components of this note with my historical intellectual property under my direct supervision. Any additions to this intellectual property were performed at my direction. Furthermore, the content and accuracy of this note have been reviewed by me to the best of my ability.

## 2022-02-22 ENCOUNTER — OFFICE VISIT (OUTPATIENT)
Dept: CARDIOLOGY CLINIC | Age: 72
End: 2022-02-22
Payer: MEDICARE

## 2022-02-22 VITALS
HEART RATE: 76 BPM | DIASTOLIC BLOOD PRESSURE: 60 MMHG | BODY MASS INDEX: 22.28 KG/M2 | WEIGHT: 147 LBS | HEIGHT: 68 IN | SYSTOLIC BLOOD PRESSURE: 106 MMHG | OXYGEN SATURATION: 97 %

## 2022-02-22 DIAGNOSIS — I25.10 CORONARY ARTERY DISEASE INVOLVING NATIVE CORONARY ARTERY OF NATIVE HEART WITHOUT ANGINA PECTORIS: Primary | ICD-10-CM

## 2022-02-22 DIAGNOSIS — I42.8 OTHER CARDIOMYOPATHY (HCC): ICD-10-CM

## 2022-02-22 DIAGNOSIS — Z86.16 HISTORY OF COVID-19: ICD-10-CM

## 2022-02-22 DIAGNOSIS — I10 ESSENTIAL HYPERTENSION: ICD-10-CM

## 2022-02-22 DIAGNOSIS — E78.2 MIXED HYPERLIPIDEMIA: ICD-10-CM

## 2022-02-22 DIAGNOSIS — R60.0 BILATERAL LEG EDEMA: ICD-10-CM

## 2022-02-22 DIAGNOSIS — I50.32 CHRONIC DIASTOLIC CONGESTIVE HEART FAILURE (HCC): ICD-10-CM

## 2022-02-22 PROCEDURE — 99214 OFFICE O/P EST MOD 30 MIN: CPT | Performed by: INTERNAL MEDICINE

## 2022-02-22 RX ORDER — TORSEMIDE 20 MG/1
40 TABLET ORAL DAILY
Qty: 180 TABLET | Refills: 3 | Status: SHIPPED | OUTPATIENT
Start: 2022-02-22

## 2022-02-22 RX ORDER — CLOPIDOGREL BISULFATE 75 MG/1
75 TABLET ORAL DAILY
Qty: 90 TABLET | Refills: 3 | Status: SHIPPED | OUTPATIENT
Start: 2022-02-22

## 2022-02-22 RX ORDER — SPIRONOLACTONE 25 MG/1
25 TABLET ORAL DAILY
Qty: 90 TABLET | Refills: 3 | Status: SHIPPED | OUTPATIENT
Start: 2022-02-22

## 2022-02-22 RX ORDER — MIDODRINE HYDROCHLORIDE 5 MG/1
5 TABLET ORAL 2 TIMES DAILY
Qty: 90 TABLET | Refills: 0 | Status: SHIPPED | OUTPATIENT
Start: 2022-02-22 | End: 2022-04-19

## 2022-02-22 RX ORDER — METOPROLOL SUCCINATE 25 MG/1
12.5 TABLET, EXTENDED RELEASE ORAL DAILY
Qty: 45 TABLET | Refills: 3 | Status: SHIPPED | OUTPATIENT
Start: 2022-02-22

## 2022-02-22 NOTE — PATIENT INSTRUCTIONS
Your provider has ordered testing for further evaluation. An order/prescription has been included in your paper work.  To schedule outpatient testing, contact Central Scheduling [facility] ([phone]). Patient Plan:  1. Order limited echocardiogram of heart to assess heart function   -  I will call with results  2. Recommend compressions stockings below the knees to decrease swelling  3. Elevate your legs above the heart to decrease swelling  4. Increase your torsemide for no longer than 3 days to decrease swelling  5. Decrease midodrine to two times a day   - If your blood pressure drops below 90 on the top number or you get  lightheaded take midodrine three times a day  6. You can hold Aspirin 81 mg to help decrease bruising   7. Continue Plavix 75 mg  8.  Follow up in 6 months

## 2022-02-22 NOTE — LETTER
Demario San   1950      [address]   Cardiovascular Evaluation    PATIENT: Demario Kitchen  DATE: 2022  MRN: [id number]  CSN: [id number]  : 1950      Primary Care Doctor: Efren Quiroz MD  Reason for evaluation:   No chief complaint on file. follow up for CAD. Subjective:   History of present illness on initial date of evaluation:   Demario Kitchen is a 80 y.o. patient with a history of CAD with NSTEMI, anemia, HLD and DM who presents for follow up. She had right fem-pop bypass 2017. She present to the ER on 21 with fatigue and chest pain. Her echo from 21 showed her EF was 55%. Mild MR and TR. Her cardiac cath from 21 showed severe MV CAD. She underwent CABG x3 with BIMAL obliteration with VICKY Lanza on 21. On 05/10/21 she underwent mediastinal exploration and evacuation of Hematoma with DR Rocha. At her last visit she was referred to cardiac rehab. She was admitted 8/10/2021 for shortness of breath and treated for CHF. Echo showed EF 50%, possibly severe MR. [address] [place] [address] 2021 showed occluded SVG-OM, suspect severe MR due to papillary dysfunction r/t occluded RCA/LCx. A limited echocardiogram from 2021 showed an EF of 30%, moderate mitral reg. Last OV 10/21/2021, stated SOB started prior to her last cath and has decreased some since her procedure. She has occasional sharp quick chest pain at night. She stated she has leg swelling if she does not wear her compression stockings. She has gained a few pounds over the last few days. She is currently wearing a wound Vac for a wound on her bottom and additional ulcer at her left leg vein harvest site- is following with wound care- Dr. Caro Swan. He has jonnathan having dizziness if she reaches above her head to get something out of her kitchen cabinets. Today she states she is doing well. She had COVID in 2021.  She says her heart feels back to normal. She states she has jonnathan eating more Bronchiectasis with acute exacerbation (HCC), Cellulitis and abscess of left leg, Cellulitis of left lower extremity, CHF (congestive heart failure) (HCC), Closed compression fracture of thoracic vertebra (HCC), Closed fracture of facial bone with routine healing, Closed jaw fracture ([address] Community acquired pneumonia of left lower lobe of lung, Compression fracture of L1 lumbar vertebra ([address] COPD (chronic obstructive pulmonary disease) ([address] COVID, Fracture of tibial plateau, closed, left, initial encounter, Minimally displaced zone I fracture of sacrum ([address] MRSA (methicillin resistant staph aureus) culture positive, MRSA (methicillin resistant Staphylococcus aureus), Mucus plugging of bronchi, NSTEMI (non-ST elevated myocardial infarction) ([address] Osteomyelitis of mandible, Osteoporosis with pathological fracture, Post herpetic neuralgia, Proximal humerus fracture, Rheumatoid arthritis ([address] Shingles, Sleep apnea, Status post incision and drainage, Surgical wound dehiscence, initial encounter (at E SVG harvest site), Temporal arteritis ([address] Tobacco use, Tracheomalacia, and Vitreous hemorrhage, right eye ([address] Surgical History:   has a past surgical history that includes hernia repair; Mandible fracture surgery; Foot surgery; Elbow surgery; Cataract removal; Tubal ligation; Knee arthroscopy; Kyphosis surgery; laminectomy; Spinal fusion; Septoplasty (05/07/2013); Artery surgery (05/30/2013); Upper gastrointestinal endoscopy (04/08/2014); bronchoscopy; bronchoscopy (N/A, 06/12/2019); Mandible fracture surgery (02/2020); back surgery (08/2020); other surgical history (01/08/2021); Pressure ulcer debridement (N/A, 01/08/2021); Artery Biopsy (Right, 03/01/2021); Coronary artery bypass graft (N/A, 05/03/2021); Mediastinoscopy (N/A, 05/05/2021); Cardiac surgery (05/03/2021); Leg Surgery (Left, 7/13/2021); IR MIDLINE CATH (7/14/2021); transesophageal echocardiogram (11/02/2021); and Colonoscopy.      Social History:   reports that she quit smoking about 30 years ago. Her smoking use included cigarettes. She has a 20.00 pack-year smoking history. She has never used smokeless tobacco. She reports previous alcohol use. She reports that she does not use drugs. Family History:  No evidence for sudden cardiac death or premature CAD    Home Medications:  Reviewed and are listed in nursing record. and/or listed below  Current Outpatient Medications   Medication Sig Dispense Refill    azithromycin (ZITHROMAX) 250 MG tablet Take 1 tablet by mouth daily 90 tablet 3    predniSONE (DELTASONE) 1 MG tablet Take 4 mg by mouth daily      ondansetron (ZOFRAN) 4 MG tablet every 8 hours as needed       oxyCODONE-acetaminophen (PERCOCET)  MG per tablet daily as needed.  insulin glargine (LANTUS) 100 UNIT/ML injection vial Inject 30 Units into the skin nightly 1 pen 0    predniSONE (DELTASONE) 20 MG tablet 1 1/2 qd- 3 days,1 qd- 3 days,1/2 qd- 3 days 9 tablet 0    midodrine (PROAMATINE) 5 MG tablet Take 1 tablet by mouth 3 times daily 90 tablet 0    torsemide (DEMADEX) 20 MG tablet Take 2 tablets by mouth daily If weight 131 lbs or less, decrease to 20 mg daily 60 tablet 5    metoprolol succinate (TOPROL XL) 25 MG extended release tablet Take 0.5 tablets by mouth daily 45 tablet 3    spironolactone (ALDACTONE) 25 MG tablet Take 1 tablet by mouth daily 90 tablet 3    DALIRESP 500 MCG tablet TAKE 1 TABLET BY MOUTH DAILY 30 tablet 11    clopidogrel (PLAVIX) 75 MG tablet Take 1 tablet by mouth daily 30 tablet 11    docusate sodium (COLACE) 100 MG capsule Take 100 mg by mouth 2 times daily as needed for Constipation      gabapentin (NEURONTIN) 300 MG capsule Take 300 mg by mouth 2 times daily.       latanoprost (XALATAN) 0.005 % ophthalmic solution Place 1 drop into both eyes nightly      Teriparatide, Recombinant, (FORTEO) 600 MCG/2.4ML SOPN injection Inject 20 mcg into the skin daily      NARCAN 4 MG/0.1ML LIQD nasal spray as needed       morphine (MS CONTIN) 15 MG extended release tablet 15 mg 2 times daily.  ipratropium (ATROVENT) 0.06 % nasal spray USE 2 SPRAYS BY NASAL ROUTE 2-4 TIMES DAILY (Patient taking differently: 3 times daily as needed ) 1 Bottle 5    albuterol sulfate  (90 Base) MCG/ACT inhaler INHALE 2 PUFFS INTO THE LUNGS EVERY 4 HOURS AS NEEDED FOR WHEEZING 1 Inhaler 5    vitamin D (CHOLECALCIFEROL) 1000 UNIT TABS tablet Take 5,000 Units by mouth daily       calcium carbonate (OSCAL) 500 MG TABS tablet Take 500 mg by mouth daily      atorvastatin (LIPITOR) 40 MG tablet Take 40 mg by mouth      Insulin Syringe-Needle U-100 31G X 5/16\" 0.5 ML MISC USE 5 TIMES DAILY      ACCU-CHEK CEDRICK PLUS strip TEST 4 TIMES DAILY  3    aspirin EC 81 MG EC tablet Take 1 tablet by mouth daily      insulin lispro (HUMALOG) 100 UNIT/ML injection vial Inject 0-12 Units into the skin 3 times daily (with meals)      Misc. Devices (ACAPELLA) MISC Take 1 Device by mouth as needed 1 each 0    fluticasone (FLONASE) 50 MCG/ACT nasal spray INHALE 2 SPRAYS IN EACH NOSTRIL DAILY 1 Bottle 5    cetirizine (ZYRTEC) 10 MG tablet Take 10 mg by mouth daily.  omeprazole (PRILOSEC) 40 MG capsule Take 40 mg by mouth daily        No current facility-administered medications for this visit. Allergies:  Atenolol     Review of Systems:   A 14 point review of symptoms completed. Pertinent positives identified in the HPI, all other review of symptoms negative as below. Objective:   PHYSICAL EXAM:    There were no vitals filed for this visit.        Wt Readings from Last 3 Encounters:   02/18/22 148 lb 8 oz (67.4 kg)   02/11/22 144 lb 6.4 oz (65.5 kg)   01/14/22 142 lb (64.4 kg)         General Appearance:  Alert, cooperative, no distress, appears stated age   Head:  Normocephalic, atraumatic   Eyes:  PERRL, conjunctiva/corneas clear   Nose: Nares normal, no drainage or sinus tenderness   Throat: Lips, mucosa, and tongue normal   Neck: Supple, symmetrical, trachea midline, NL thyroid no carotid bruit or JVD   Lungs:   CTAB, respirations unlabored, well-healed surgical scar   Chest Wall:  No tenderness or deformity   Heart:  Regular rhythm and normal rate; S1, S2 are normal;   no murmur noted; no rub or gallop   Abdomen:   Soft, non-tender, +BS x 4, no masses, no organomegaly   Extremities: Extremities normal, atraumatic, no cyanosis 1+ BLE pitting R>L edema   Pulses: 2+ and symmetric   Skin: Skin color, texture, turgor normal, no rashes or lesions   Pysch: Normal mood and affect   Neurologic: Normal gross motor and sensory exam.         LABS   CBC:      Lab Results   Component Value Date    WBC 7.7 01/05/2022    RBC 3.33 01/05/2022    HGB 10.4 01/05/2022    HCT 31.0 01/05/2022    MCV 93.2 01/05/2022    RDW 15.2 01/05/2022     01/05/2022     CMP:  Lab Results   Component Value Date     01/05/2022    K 5.4 01/05/2022    K 4.1 12/21/2021    CL 98 01/05/2022    CO2 24 01/05/2022    BUN 26 01/05/2022    CREATININE 0.8 01/05/2022    GFRAA >60 01/05/2022    GFRAA >60 05/07/2013    AGRATIO 0.9 01/05/2022    LABGLOM >60 01/05/2022    GLUCOSE 210 01/05/2022    PROT 6.5 01/05/2022    PROT 7.1 03/21/2013    CALCIUM 8.8 01/05/2022    BILITOT 0.6 01/05/2022    ALKPHOS 140 01/05/2022    AST 22 01/05/2022    ALT 26 01/05/2022     PT/INR:   No results found for: PTINR  Liver:  No components found for: CHLPL  Lab Results   Component Value Date    ALT 26 01/05/2022    AST 22 01/05/2022    ALKPHOS 140 (H) 01/05/2022    BILITOT 0.6 01/05/2022     Lab Results   Component Value Date    LABA1C 7.7 12/20/2021     Lipids:         Lab Results   Component Value Date    TRIG 85 10/29/2021    TRIG 97 08/11/2021    TRIG 64 05/03/2021            Lab Results   Component Value Date    HDL 34 (L) 10/29/2021    HDL 34 (L) 08/11/2021    HDL 38 (L) 05/03/2021            Lab Results   Component Value Date    LDLCALC 61 10/29/2021    LDLCALC 54 2021    LDLCALC 35 2021            Lab Results   Component Value Date    LABVLDL 17 10/29/2021    LABVLDL 19 2021    LABVLDL 13 2021         CARDIAC DATA   EKG 2021  Sinus rhythm with occasional Premature ventricular complexes  Possible Left atrial enlargement  Non-specific intra-ventricular conduction delay  Nonspecific ST and T wave abnormality  Abnormal ECG    EK21  SR HR 85    ECHO Transesophageal 10/29/2021   Summary   -- Ejection fraction is visually estimated to be 50-55 %. -- Mild mitral regurgitation. -- The left atrium is mildly dilated. There is no evidence of mass or   thrombus in the left atrium or appendage. -- Bubble study was performed and showed grade I pulmonary arteriovenous   malformation ( < 30 bubbles in left ventricle after 3rd cardiac cycle). Moderate layered plaque is noted in the ascending aorta and arch. Limited echocardiogram 2021  Conclusions   Summary   Limited exam for mitral valve regurgitation. Moderate mitral regurgitation. Compared to exam done 21 mitral valve regurgitation has decreased. Severe left ventricular dysfunction EF < 30%       ECHO/MUGA: 21  Normal left ventricle systolic function with an estimated ejection fraction   of 55%. No regional wall motion abnormalities are seen. Normal left ventricular diastolic filling pressure. Mild eccentric aortic regurgitation. Mild mitral and tricuspid regurgitation. Systolic pulmonary artery pressure (SPAP) is normal and estimated at 27 mmHg   (right atrial pressure 3 mmHg).       STRESS TEST:    Cath:[facility] 10/7/[facility]  This is a right dominant coronary arterial system    Left Main coronary artery: distal 30-40% lesion  Left anterior descending coronary artery: ostial 30-40% lesion  Left circumflex coronary artery: Heavily calcified proximal   vessel with ostial 50-60% lesion, mid 50-60% lesion, OM2: normal   caliber vessel, heavily calcified with Mid 100% after large diagonal, distal supplied by LIMA   Cx 99% ostial, distal supplied by R-L collaterals   RI N/A   RCA 70% distal, % prox   S-O occluded   S-PLB patent   L-LAD  patent   LVEDP 8   LVG NA due to contrast      Intervention:         None     Post Cath Dx:       Severe native disease as above  Occluded S-OM  Suspect severe MR related to papillary dysfunction related to occluded distal RCA and Cx  Consider MVR when wound issues resolve if MR does not improved with medical therapy  Would followup with Dr. Gunjan Arroyo        VASCULAR/OTHER IMAGING:      Assessment and Plan   Jabier Sherman is a 80 y.o. female who presents today for the following problems:      1. CAD:    - 5/10/2021 3V CABG   - 8/2021 closure of SVG-OM  2. Mitral regurg: mild (on ABHISHEK)  3.  Bilateral lower extremity edema   R>L due to vein harvesting  4. Mixed ischemic/nonischemic systolic cardiomyopathy: Improving    LVEF <30%  5. History of Covid 2021      MD Plan:  1. She is feeling much better following Covid resolution and medical treatment of her cardiac condition  2. Reviewed last ABHISHEK, I think I was over generous and calling her EF as I do not believe she is made a full recovery    -I will update a transthoracic echo for interval MR, LVEF and filling pressure  3. She has significant bruising that is bothering her so I will discontinue her aspirin and continue Plavix monotherapy  4. Was prescribed Midodrine during last hospitalization discussed weaning off as blood pressure tolerates. Okay systolic blood pressures in the 90s given her heart dysfunction as long she is asymptomatic  5. Continue Lipitor, Plavix, Toprol, Aldactone, torsemide  6. Does have some edema in the legs I discussed elevating her legs above her heart until her compression stockings arrive.  Would increase torsemide to 40 mg for 2 to 3 days to help and return to 20 mg after    MDM: mod    Patient Active Problem List   Diagnosis    Rheumatoid arthritis ([facility]    Psoriasis    GERD (gastroesophageal reflux disease)    Hyponatremia    Anemia    Cylindrical bronchiectasis (HCC)    Tracheobronchomalacia    Immunocompromised state ([address]    Coronary artery disease    Bilateral lower extremity edema    Essential hypertension    Lumbar spondylosis    Mitral valve insufficiency and aortic valve insufficiency    Mixed hyperlipidemia    Myopia of both eyes    Osteoporosis    Other chronic sinusitis    Primary open angle glaucoma (POAG) of both eyes, mild stage    Primary osteoarthritis of right hip    Type 2 diabetes mellitus with unspecified diabetic retinopathy without macular edema (HCC)    Type 2 diabetes mellitus with diabetic peripheral angiopathy without gangrene, with long-term current use of insulin (HCC)    Pressure ulcer of coccygeal region, stage 4 (HCC)    Hx of CABG    Mild malnutrition (HCC)    Chronic diastolic congestive heart failure (HCC)    Chronic obstructive pulmonary disease (HCC)    Nonrheumatic mitral (valve) insufficiency    Acute respiratory failure with hypoxia (HCC)    COVID-19    Acute on chronic respiratory failure with hypoxia (HCC)    HCAP (healthcare-associated pneumonia)    COVID    Pneumonia due to infectious organism    Hypokalemia    Pressure ulcer of left heel, stage 3 ([address]       Patient Plan:  1. Order limited echocardiogram of heart to assess heart function   -  I will call with results  2. Recommend compressions stockings below the knees to decrease swelling  3. Elevate your legs above the heart to decrease swelling  4. Increase your torsemide for no longer than 3 days to decrease swelling  5. Decrease midodrine to two times a day   - If your blood pressure drops below 90 on the top number or you get  lightheaded take midodrine three times a day  6. You can hold Aspirin 81 mg to help decrease bruising   7. Continue Plavix 75 mg  8.  Follow up in 6 months    This note was scribed in the presence of Susy Mai MD by Park Leiva RN.        Odilia Bhakta MD, personally performed the services described in this documentation as scribed by the above signed scribe in my presence, and it is both accurate and complete to the best of our ability and knowledge. I agree with the details independently gathered by my clinical support staff, while the remaining scribed note accurately describes my personal service to the patient. The above RN is working as a scribe for and in the presence of myself . Working as a scribe, the RN may have prepopulated components of this note with my historical intellectual property under my direct supervision. Any additions to this intellectual property were performed at my direction. Furthermore, the content and accuracy of this note have been reviewed by me to the best of my ability.

## 2022-03-04 ENCOUNTER — HOSPITAL ENCOUNTER (OUTPATIENT)
Dept: WOUND CARE | Age: 72
Discharge: HOME | End: 2022-03-04
Payer: MEDICARE

## 2022-03-04 VITALS
WEIGHT: 148.6 LBS | SYSTOLIC BLOOD PRESSURE: 135 MMHG | HEART RATE: 82 BPM | DIASTOLIC BLOOD PRESSURE: 76 MMHG | RESPIRATION RATE: 18 BRPM | BODY MASS INDEX: 22.52 KG/M2 | HEIGHT: 68 IN

## 2022-03-04 DIAGNOSIS — L89.154 PRESSURE ULCER OF COCCYGEAL REGION, STAGE 4 (HCC): Primary | ICD-10-CM

## 2022-03-04 PROCEDURE — 11042 DBRDMT SUBQ TIS 1ST 20SQCM/<: CPT

## 2022-03-04 PROCEDURE — 97597 DBRDMT OPN WND 1ST 20 CM/<: CPT

## 2022-03-04 PROCEDURE — 17250 CHEM CAUT OF GRANLTJ TISSUE: CPT

## 2022-03-04 PROCEDURE — 11042 DBRDMT SUBQ TIS 1ST 20SQCM/<: CPT | Performed by: INTERNAL MEDICINE

## 2022-03-04 PROCEDURE — 97597 DBRDMT OPN WND 1ST 20 CM/<: CPT | Performed by: INTERNAL MEDICINE

## 2022-03-04 RX ORDER — LIDOCAINE 50 MG/G
OINTMENT TOPICAL ONCE
Status: CANCELLED | OUTPATIENT
Start: 2022-03-04 | End: 2022-03-04

## 2022-03-04 RX ORDER — LIDOCAINE HYDROCHLORIDE 40 MG/ML
SOLUTION TOPICAL ONCE
Status: CANCELLED | OUTPATIENT
Start: 2022-03-04 | End: 2022-03-04

## 2022-03-04 RX ORDER — LIDOCAINE 40 MG/G
CREAM TOPICAL ONCE
Status: CANCELLED | OUTPATIENT
Start: 2022-03-04 | End: 2022-03-04

## 2022-03-04 RX ORDER — BACITRACIN ZINC AND POLYMYXIN B SULFATE 500; 1000 [USP'U]/G; [USP'U]/G
OINTMENT TOPICAL ONCE
Status: CANCELLED | OUTPATIENT
Start: 2022-03-04 | End: 2022-03-04

## 2022-03-04 RX ORDER — LIDOCAINE 40 MG/G
CREAM TOPICAL ONCE
Status: DISCONTINUED | OUTPATIENT
Start: 2022-03-04 | End: 2022-03-05 | Stop reason: HOSPADM

## 2022-03-04 ASSESSMENT — PAIN DESCRIPTION - ORIENTATION: ORIENTATION: LEFT

## 2022-03-04 ASSESSMENT — PAIN DESCRIPTION - PAIN TYPE: TYPE: ACUTE PAIN

## 2022-03-04 ASSESSMENT — PAIN DESCRIPTION - LOCATION: LOCATION: FOOT

## 2022-03-04 ASSESSMENT — PAIN - FUNCTIONAL ASSESSMENT: PAIN_FUNCTIONAL_ASSESSMENT: PREVENTS OR INTERFERES SOME ACTIVE ACTIVITIES AND ADLS

## 2022-03-04 ASSESSMENT — PAIN DESCRIPTION - FREQUENCY: FREQUENCY: INTERMITTENT

## 2022-03-04 ASSESSMENT — PAIN SCALES - GENERAL: PAINLEVEL_OUTOF10: 6

## 2022-03-04 ASSESSMENT — PAIN DESCRIPTION - DESCRIPTORS: DESCRIPTORS: SHARP

## 2022-03-04 ASSESSMENT — PAIN DESCRIPTION - PROGRESSION: CLINICAL_PROGRESSION: NOT CHANGED

## 2022-03-04 ASSESSMENT — PAIN DESCRIPTION - ONSET: ONSET: ON-GOING

## 2022-03-04 NOTE — PLAN OF CARE
Wound debrided per MD and patient tolerated well. MD does want patient to increase gabapentin t 3 x day ad patient was instructed that if she does not tolerated the increase due to side effects to back off to regular dose. Will check in in 1 week with patient to see how the increase in gabapentin was tolerated. Discharge instructions reviewed with patient, all questions answered, copy given to patient. Dressings were applied to all wounds per M.D. Instructions at this visit.

## 2022-03-04 NOTE — PROGRESS NOTES
[address] [facility] Physician Orders and Discharge [address]  [address], [address]  [place], [facility]  Telephone: [phone]      Fax: [phone]        Your home care company:   [employer] Ponce [phone]      Your wound-care supplies will be provided by: [facility] AT [facility]. Please note, depending on your insurance coverage, you may have out-of-pocket expenses for these supplies. Someone from [facility] may call you to confirm your order and discuss those potential costs before they ship your products -- please anticipate that call. If your out-of-pocket cost is going to be less than $200, and [facility] cannot reach you, they may ship your supplies as soon as the order is processed, and will send you a bill. If your out-of-pocket cost is going to be more than $200, [facility] should not ship your supplies until they speak with you. If you have any questions about your supplies or your potential out-of-pocket costs, or if you need to place an order for a refill of supplies (typically monthly), Angelita Sánchez is your local representative, and can be reached at [phone]. Information on [facility]'s return policy will also be enclosed with your supplies -- please read that carefully before opening your items. NAME:  Corita Crigler Pegan   YOB: 1950  PRIMARY DIAGNOSIS FOR WOUND CARE CENTER:  Pressure ulcer     Wound cleansing:   Do not scrub or use excessive force. Wash hands with soap and water before and after dressing changes. Prior to applying a clean dressing, cleanse wound with normal saline, wound cleanser, or mild soap and water. Ask your physician or nurse before getting the wound(s) wet in the shower.                 Wound care for home:        Left Medial Heel, Deep tissue injury:  Mix Triad and Antibiotic ointment and apply to wound (if wound is more dry add more antibiotic ointment and if wound is draining more add -- please talk with us when you're ready to quit. F/U Appointment is with Dr. César Niño in 2 weeks on                                                at                       .     Your nurse  is Caitlin Rodriguez RN      If we applied slip-resistant hospital socks today, be sure to remove them at least once a day to inspect your toes or feet, even if you're not changing the wraps or dressings underneath. If you see anything concerning (redness, excess moisture, etc), please call and let us know right away.      Should you experience any significant changes in your wound(s) (including redness, increased warmth, increased pain, increased drainage, odor, or fever) or have questions about your wound care, please contact the [facility] at [phone] Monday-Thursday from 8:00 am - 4:30 pm, or Friday from 8:00 am - 2:30 pm.  If you need help with your wound outside these hours and cannot wait until we are again available, contact your home-care company (if applicable), your PCP, or go to the nearest emergency room

## 2022-03-10 PROBLEM — Z95.1 HX OF CABG: Status: RESOLVED | Noted: 2021-07-11 | Resolved: 2022-03-10

## 2022-03-10 PROBLEM — J96.01 ACUTE RESPIRATORY FAILURE WITH HYPOXIA (HCC): Status: RESOLVED | Noted: 2021-12-13 | Resolved: 2022-03-10

## 2022-03-10 PROBLEM — J96.11 CHRONIC RESPIRATORY FAILURE WITH HYPOXIA (HCC): Status: ACTIVE | Noted: 2021-12-20

## 2022-03-10 PROBLEM — U07.1 COVID-19: Status: RESOLVED | Noted: 2021-12-14 | Resolved: 2022-03-10

## 2022-03-11 ENCOUNTER — TELEPHONE (OUTPATIENT)
Dept: WOUND CARE | Age: 72
End: 2022-03-11

## 2022-03-11 NOTE — TELEPHONE ENCOUNTER
Call placed to patient to check on how she is tolerating the increase in gabapentin Dr. Philly Stokes started her on. Patient states she is not having any side effects and is starting to see some improvement in s/sx of pain with the increase. Michelle Link

## 2022-03-11 NOTE — PROGRESS NOTES
[facility] Progress Note    Tristin Thomas     : 1950    DATE OF VISIT:  3/4/2022    Subjective:     Tristin Thomas is a 80 y.o. female who has a pressure ulcer located on the sacrum and foot (left , heel). Significant symptoms or pertinent wound history since last visit: feeling well overall, but still has a good deal of pain in the left foot, sounds mostly neuropathic, not necessarily related to body position, but worse with contact; on gabapentin 300 BID chronically. No fever or chills, eating well. Additional ulcer(s) noted? no.      Her current medication list consists of Acapella, Insulin Syringe-Needle U-100, Roflumilast, Teriparatide (Recombinant), albuterol sulfate HFA, aspirin EC, atorvastatin, azithromycin, blood glucose test strips, calcium carbonate, cetirizine, clopidogrel, docusate sodium, fluticasone, gabapentin, insulin glargine, insulin lispro, ipratropium, latanoprost, metoprolol succinate, midodrine, morphine, naloxone, omeprazole, ondansetron, oxyCODONE-acetaminophen, predniSONE, spironolactone, torsemide, and vitamin D. Allergies: Atenolol    Objective:     Vitals:    22 0947 22 0956   BP:  135/76   Pulse:  82   Resp: 18    TempSrc: Oral    Weight: 148 lb 9.6 oz (67.4 kg)    Height: 5' 8\" (1.727 m)      AAOx3, fatigued, NAD  No cellulitis, angitis, fluctuance  No acute arthritis or bursitis  Mild-mod LE edema  No contact dermatitis or cutaneous Candidiasis  Blanca-ulcer skin: indurated, pink, warm and dry, still a bit of callus and hyperkeratosis at heel. Ulcer(s):  sacrum is a bit smaller, more pink and red, less fibrotic, some new granulation, peripheral hypergranulation with some fibrin and biofilm, less depth, one or two edges with better epidermal tissue; heel ulcer more stagnant than I expected, pale tan-yellow, fibronecrotic, focally into fat tissue, some fibrin and biofilm, just a bit smaller.  Photos also saved in electronic chart.    Today's wound measurements, per RN documentation:  Wound 12/20/21 #5, Left Heel, Pressure, Stage 3 (onset 12/10/2021)-Wound Length (cm): 1.8 cm  Wound 12/18/20 #2, Sacrum, Pressure Injury, Stage 4, Onset 5/2020-Wound Length (cm): 2.2 cm    Wound 12/20/21 #5, Left Heel, Pressure, Stage 3 (onset 12/10/2021)-Wound Width (cm): 0.6 cm  Wound 12/18/20 #2, Sacrum, Pressure Injury, Stage 4, Onset 5/2020-Wound Width (cm): 2 cm    Wound 12/20/21 #5, Left Heel, Pressure, Stage 3 (onset 12/10/2021)-Wound Depth (cm): 0.2 cm  Wound 12/18/20 #2, Sacrum, Pressure Injury, Stage 4, Onset 5/2020-Wound Depth (cm): 0.6 cm    Assessment:     Patient Active Problem List   Diagnosis Code    Rheumatoid arthritis ([facility] M06.9    Psoriasis L40.9    GERD (gastroesophageal reflux disease) K21.9    Hyponatremia E87.1    Anemia D64.9    Cylindrical bronchiectasis ([facility]) J47.9    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    Immunocompromised state ([facility] D84.9    Coronary artery disease I25.10    Bilateral lower extremity edema R60.0    Essential hypertension I10    Lumbar spondylosis M47.816    Mitral valve insufficiency and aortic valve insufficiency I08.0    Mixed hyperlipidemia E78.2    Myopia of both eyes H52.13    Osteoporosis M81.0    Other chronic sinusitis J32.8    Primary open angle glaucoma (POAG) of both eyes, mild stage H40.1131    Primary osteoarthritis of right hip M16.11    Type 2 diabetes mellitus with unspecified diabetic retinopathy without macular edema ([facility]) E11.319    Type 2 diabetes mellitus with diabetic peripheral angiopathy without gangrene, with long-term current use of insulin ([facility]) E11.51, Z79.4    Pressure ulcer of coccygeal region, stage 4 ([facility]) L89.154    Mild malnutrition ([facility]) E44.1    Chronic diastolic congestive heart failure ([facility]) I50.32    Chronic obstructive pulmonary disease ([facility]) J44.9    Hypergranulation L92.9    Nonrheumatic mitral (valve) insufficiency I34.0    Chronic respiratory failure with hypoxia ([facility]) J96.11    Pressure ulcer of left heel, stage 3 ([facility]) L89.623       Assessment of today's active condition(s): RA, decreased mobility, Hx sacral DTI, progressed to a stage 4 pressure ulcer, never did have osteo, very slowly making some progress with current Rx, especially with good attention to offloading. Also a more recent left heel pressure ulcer that needs a bit more aggressive debridement, and some improved analgesia if we can. Factors contributing to occurrence and/or persistence of the chronic ulcer include edema, diabetes, chronic pressure, decreased mobility, shear force and immunosuppression. Medical necessity of today's visit is shown by the above documentation. Sharp debridement is indicated today, based upon the exam findings in the wound(s) above. Procedure note:     Consent obtained. Time out performed per [address] [place] [address]  policy. Anesthetic  Anesthetic: 4% Lidocaine Cream     Using a curette, I sharply debrided the sacrum ulcer(s) down through and including the removal of dermis. The type(s) of tissue debrided included fibrin and biofilm. Total Surface Area Debrided: 4 sq cm. Using a curette, #15 blade scalpel and forceps, I sharply debrided the foot (left , heel) ulcer(s) down through and including the removal of subcutaneous tissue. The type(s) of tissue debrided included fibrin, slough and necrotic/eschar. Total Surface Area Debrided: 1 sq cm. The ulcers were then irrigated with normal saline solution. The procedure was completed with a small amount of bleeding, and hemostasis was with pressure. The patient tolerated the procedure well, with no significant complications. The patient's level of pain during and after the procedure was monitored.  Post-debridement measurements, if different from pre-debridement, are in the flowsheet as well.  _____________    To encourage better epithelial cell coverage, I did use AgNO3 to chemically cauterize hypergranulation tissue on the sacrum ulcer(s), after application of 4% lidocaine topical solution. This was tolerated well, with no pain or skin injury. Discharge plan:     Treatment in the wound care center today, per RN documentation: Wound 12/20/21 #5, Left Heel, Pressure, Stage 3 (onset 12/10/2021)-Dressing/Treatment:  (Triad,PSO,gauze, mepilex border)  Wound 12/18/20 #2, Sacrum, Pressure Injury, Stage 4, Onset 5/2020-Dressing/Treatment:  (triad (maegan),collagen, mepilex border). Keep up with efforts at protein intake and offloading; she has the hospital bed, group 2 mattress, ROHO cushion and a [employer]el[employer]ix boot. Continue Spandagrip and elevation for edema control. Increase gabapentin to TID this week if tolerated, and we'll speak next Friday to see how she's doing from a pain standpoint. Home treatment: good handwashing before and after any dressing changes. Cleanse wound with saline or soap & water before dressing change. May use Vaseline (petrolatum), Aquaphor, Aveeno, CeraVe, Cetaphil, Eucerin, Lubriderm, etc for dry skin. Dressing type for home: Vashe, ZnO, collagen and a dry dressing to the sacrum; Triad + polysporin and a dry dressing to the heel, once daily. Written discharge instructions given to patient. Follow up in 2 weeks.     Electronically signed by Missael Santiago MD on 3/10/2022 at 8:50 PM.

## 2022-03-16 ENCOUNTER — TELEPHONE (OUTPATIENT)
Dept: CARDIOLOGY CLINIC | Age: 72
End: 2022-03-16

## 2022-03-16 DIAGNOSIS — R06.02 SOB (SHORTNESS OF BREATH): Primary | ICD-10-CM

## 2022-03-16 DIAGNOSIS — R60.9 EDEMA, UNSPECIFIED TYPE: ICD-10-CM

## 2022-03-16 NOTE — TELEPHONE ENCOUNTER
Tiffany Rivera pts home health nurse called to say that pt has had a weight gain. 3/14 pt was 145lbs 3/16 pt is 151lbs. Tiffany Rivera stated that pts lungs are diminished and that she has pitting edema in both legs. Pt stated that she has taken an extra dose of her water tablet. Tiffany Rivera can be reached at [phone]. Please advise.

## 2022-03-16 NOTE — TELEPHONE ENCOUNTER
Please advise:    She states she has taken an extra torsemide daily since theweight gain started 3/14 and has not noticed a difference. She is wearing compressions stocking that is not helping with leg swelling.      3/14 145 lbs  3/16 151 lbs

## 2022-03-17 ENCOUNTER — HOSPITAL ENCOUNTER (OUTPATIENT)
Dept: GENERAL RADIOLOGY | Age: 72
Discharge: HOME | End: 2022-03-17
Payer: MEDICARE

## 2022-03-17 ENCOUNTER — HOSPITAL ENCOUNTER (OUTPATIENT)
Age: 72
Discharge: HOME | End: 2022-03-17
Payer: MEDICARE

## 2022-03-17 DIAGNOSIS — R06.02 SOB (SHORTNESS OF BREATH): ICD-10-CM

## 2022-03-17 DIAGNOSIS — R60.9 EDEMA, UNSPECIFIED TYPE: ICD-10-CM

## 2022-03-17 LAB
ANION GAP SERPL CALCULATED.3IONS-SCNC: 15 MMOL/L (ref 3–16)
BASOPHILS ABSOLUTE: 0 K/UL (ref 0–0.2)
BASOPHILS RELATIVE PERCENT: 0.5 %
BUN BLDV-MCNC: 32 MG/DL (ref 7–20)
CALCIUM SERPL-MCNC: 9.5 MG/DL (ref 8.3–10.6)
CHLORIDE BLD-SCNC: 99 MMOL/L (ref 99–110)
CO2: 29 MMOL/L (ref 21–32)
CREAT SERPL-MCNC: 1.1 MG/DL (ref 0.6–1.2)
EOSINOPHILS ABSOLUTE: 0.4 K/UL (ref 0–0.6)
EOSINOPHILS RELATIVE PERCENT: 4.8 %
GFR AFRICAN AMERICAN: 59
GFR NON-AFRICAN AMERICAN: 49
GLUCOSE BLD-MCNC: 46 MG/DL (ref 70–99)
HCT VFR BLD CALC: 32.8 % (ref 36–48)
HEMOGLOBIN: 10.6 G/DL (ref 12–16)
LYMPHOCYTES ABSOLUTE: 2.5 K/UL (ref 1–5.1)
LYMPHOCYTES RELATIVE PERCENT: 32.1 %
MCH RBC QN AUTO: 28.4 PG (ref 26–34)
MCHC RBC AUTO-ENTMCNC: 32.3 G/DL (ref 31–36)
MCV RBC AUTO: 87.7 FL (ref 80–100)
MONOCYTES ABSOLUTE: 0.7 K/UL (ref 0–1.3)
MONOCYTES RELATIVE PERCENT: 8.5 %
NEUTROPHILS ABSOLUTE: 4.2 K/UL (ref 1.7–7.7)
NEUTROPHILS RELATIVE PERCENT: 54.1 %
PDW BLD-RTO: 14.8 % (ref 12.4–15.4)
PLATELET # BLD: 224 K/UL (ref 135–450)
PMV BLD AUTO: 7.9 FL (ref 5–10.5)
POTASSIUM SERPL-SCNC: 3.8 MMOL/L (ref 3.5–5.1)
PRO-BNP: 3215 PG/ML (ref 0–124)
RBC # BLD: 3.74 M/UL (ref 4–5.2)
SODIUM BLD-SCNC: 143 MMOL/L (ref 136–145)
WBC # BLD: 7.9 K/UL (ref 4–11)

## 2022-03-17 PROCEDURE — 80048 BASIC METABOLIC PNL TOTAL CA: CPT

## 2022-03-17 PROCEDURE — 71046 X-RAY EXAM CHEST 2 VIEWS: CPT

## 2022-03-17 PROCEDURE — 83880 ASSAY OF NATRIURETIC PEPTIDE: CPT

## 2022-03-17 PROCEDURE — 85025 COMPLETE CBC W/AUTO DIFF WBC: CPT

## 2022-03-17 PROCEDURE — 36415 COLL VENOUS BLD VENIPUNCTURE: CPT

## 2022-03-18 ENCOUNTER — TELEPHONE (OUTPATIENT)
Dept: CARDIOLOGY CLINIC | Age: 72
End: 2022-03-18

## 2022-03-18 ENCOUNTER — HOSPITAL ENCOUNTER (OUTPATIENT)
Dept: WOUND CARE | Age: 72
Discharge: HOME | End: 2022-03-18
Payer: MEDICARE

## 2022-03-18 VITALS
TEMPERATURE: 98 F | WEIGHT: 150 LBS | BODY MASS INDEX: 22.73 KG/M2 | DIASTOLIC BLOOD PRESSURE: 73 MMHG | RESPIRATION RATE: 18 BRPM | HEIGHT: 68 IN | HEART RATE: 80 BPM | SYSTOLIC BLOOD PRESSURE: 131 MMHG

## 2022-03-18 DIAGNOSIS — L92.9 HYPERGRANULATION: ICD-10-CM

## 2022-03-18 DIAGNOSIS — L89.623 PRESSURE ULCER OF LEFT HEEL, STAGE 3 (HCC): ICD-10-CM

## 2022-03-18 DIAGNOSIS — L89.154 PRESSURE ULCER OF COCCYGEAL REGION, STAGE 4 (HCC): Primary | ICD-10-CM

## 2022-03-18 PROCEDURE — 11043 DBRDMT MUSC&/FSCA 1ST 20/<: CPT | Performed by: EMERGENCY MEDICINE

## 2022-03-18 PROCEDURE — 11043 DBRDMT MUSC&/FSCA 1ST 20/<: CPT

## 2022-03-18 PROCEDURE — 11042 DBRDMT SUBQ TIS 1ST 20SQCM/<: CPT | Performed by: EMERGENCY MEDICINE

## 2022-03-18 ASSESSMENT — PAIN DESCRIPTION - PAIN TYPE: TYPE: ACUTE PAIN

## 2022-03-18 ASSESSMENT — PAIN SCALES - GENERAL: PAINLEVEL_OUTOF10: 10

## 2022-03-18 ASSESSMENT — PAIN DESCRIPTION - FREQUENCY: FREQUENCY: INTERMITTENT

## 2022-03-18 ASSESSMENT — PAIN DESCRIPTION - LOCATION: LOCATION: FOOT

## 2022-03-18 ASSESSMENT — PAIN DESCRIPTION - PROGRESSION: CLINICAL_PROGRESSION: NOT CHANGED

## 2022-03-18 ASSESSMENT — PAIN DESCRIPTION - DESCRIPTORS: DESCRIPTORS: SHARP

## 2022-03-18 ASSESSMENT — PAIN DESCRIPTION - ONSET: ONSET: ON-GOING

## 2022-03-18 ASSESSMENT — PAIN - FUNCTIONAL ASSESSMENT: PAIN_FUNCTIONAL_ASSESSMENT: PREVENTS OR INTERFERES SOME ACTIVE ACTIVITIES AND ADLS

## 2022-03-18 ASSESSMENT — PAIN DESCRIPTION - ORIENTATION: ORIENTATION: LEFT

## 2022-03-18 NOTE — PROGRESS NOTES
[facility],4Th Floor Wound Care/Hyperbaric Oxygen Therapy Center   Progress Note     Carmita Perez  MEDICAL RECORD NUMBER:  [id number]  AGE: 80 y.o. GENDER: female  : 1950  EPISODE DATE:  3/18/2022    Chief complaint and reason for visit:     Chief Complaint   Patient presents with    Wound Check      Patient presenting for follow up evaluation of sacral wound and left heel    Subjective: Symptoms, wound related issues, or other pertinent wound history since last visit: Patient is currently on gabapentin. She has noticed increasing edema of lower extremity since last visit. Medical Decision Makin-year-old female who has a sacral pressure ulcer stage IV present since May 2020 and a left heel pressure ulcer stage III present since 2021. pressure ulcer stage 3 and pressure ulcer stage 4    Problem List Items Addressed This Visit     Pressure ulcer of coccygeal region, stage 4 ([address] - Primary    Hypergranulation    Pressure ulcer of left heel, stage 3 (HCC)        Comorbid conditions affecting wound healing: COPD, coronary artery disease, peripheral arterial disease, congestive heart disease    Wound(s) evaluation: Left heel ulcer with heavy epiboly and callus to the surrounding tissues. Wound bed itself is very dull with a lot of fibrotic scarring and nonviable tissue requiring debridement. Sacral wound is clean with also had epiboly and callus and fibrous exudates requiring debridement. Problems addressed during this encounter:  1. Stage IV pressure ulcer on sacrum. Debridement done. Continue as current with collagen  2. Stage III pressure ulcer left heel. Debridement done. We will do Hydrofera Blue and gentle compression treatment to lower extremity. Data reviewed and analyzed:  1. Assessment required other independent historian(s): No patient . 2. Review of prior external note from other providers done today: Yes  3.  New imaging or lab ordered today: No  4. Review of test results done today: Yes, recent lab work noted. Creatinine is slightly up at 1.1.  5. Independent interpretation of test(s): No  6. Discussion of management or test interpretation with other qualified health care professional and other external source. Risk of complications and/or mortality of patient management:  1. This patient has a moderate risk of morbidity and mortality from additional diagnostic testing or treatment. This is due to the above conditions affecting wound healing as well as patient and procedure risk factors. Education and discussion held with patient regarding these disease processes pertinent to wound(s). 2. Other pertinent decisions include: minor surgery or procedures as below. 3. The patient's diagnosis or treatment is not significantly limited by social determinants of health as noted by: N/A .  4. Prescription drug management: N/A     Wound 12/18/20 #2, Sacrum, Pressure Injury, Stage 4, Onset 5/2020 (Active)   Wound Image   03/18/22 0944   Wound Etiology Pressure Stage  4 03/18/22 0944   Dressing Status New dressing applied;Clean;Dry; Intact 03/04/22 1644   Wound Cleansed Soap and water 03/18/22 0944   Dressing/Treatment Other (comment) 02/18/22 1153   Wound Length (cm) 1.8 cm 03/18/22 0944   Wound Width (cm) 2 cm 03/18/22 0944   Wound Depth (cm) 0.6 cm 03/18/22 0944   Wound Surface Area (cm^2) 3.6 cm^2 03/18/22 0944   Change in Wound Size % (l*w) -2300 03/18/22 0944   Wound Volume (cm^3) 2.16 cm^3 03/18/22 0944   Wound Healing % -2300 03/18/22 0944   Post-Procedure Length (cm) 1.8 cm 03/18/22 0952   Post-Procedure Width (cm) 2 cm 03/18/22 0952   Post-Procedure Depth (cm) 0.6 cm 03/18/22 0952   Post-Procedure Surface Area (cm^2) 3.6 cm^2 03/18/22 0952   Post-Procedure Volume (cm^3) 2.16 cm^3 03/18/22 0952   Distance Tunneling (cm) 0 cm 02/11/22 0913   Tunneling Position ___ O'Clock 12 10/27/21 0947   Undermining Starts ___ O'Clock 1 02/18/22 1109 Undermining Ends___ O'Clock 6 02/18/22 1109   Undermining Maxium Distance (cm) 1 02/18/22 1109   Wound Assessment Pink/red 03/18/22 0944   Drainage Amount Small 03/18/22 0944   Drainage Description Serosanguinous 03/18/22 0944   Odor None 03/18/22 0944   Blanca-wound Assessment Maceration 03/18/22 0944   Margins Epibole (rolled edges) 03/18/22 0944   Number of days: 455       Wound 12/20/21 #5, Left Heel, Pressure, Stage 3 (onset 12/10/2021) (Active)   Wound Image   03/18/22 0944   Wound Etiology Pressure Stage  3 03/18/22 0944   Dressing Status New dressing applied 03/04/22 1644   Wound Cleansed Soap and water 03/18/22 0944   Dressing/Treatment Other (comment) 02/18/22 1153   Offloading for Diabetic Foot Ulcers Offloading ordered; Offloading boot 02/11/22 1012   Wound Length (cm) 0.6 cm 03/18/22 0944   Wound Width (cm) 1.6 cm 03/18/22 0944   Wound Depth (cm) 0.3 cm 03/18/22 0944   Wound Surface Area (cm^2) 0.96 cm^2 03/18/22 0944   Change in Wound Size % (l*w) 46.67 03/18/22 0944   Wound Volume (cm^3) 0.288 cm^3 03/18/22 0944   Wound Healing % 20 03/18/22 0944   Post-Procedure Length (cm) 0.6 cm 03/18/22 0952   Post-Procedure Width (cm) 1.6 cm 03/18/22 0952   Post-Procedure Depth (cm) 0.3 cm 03/18/22 0952   Post-Procedure Surface Area (cm^2) 0.96 cm^2 03/18/22 0952   Post-Procedure Volume (cm^3) 0.288 cm^3 03/18/22 0952   Distance Tunneling (cm) 0 cm 02/11/22 0913   Undermining Starts ___ O'Clock 12 03/18/22 0944   Undermining Ends___ O'Clock 12 03/18/22 0944   Undermining Maxium Distance (cm) 0.2 03/18/22 0944   Wound Assessment [place]/red;Slough 03/18/22 0944   Drainage Amount Small 03/18/22 0944   Drainage Description Serosanguinous 03/18/22 0944   Odor None 03/18/22 0944   Blanca-wound Assessment Hyperkeratosis (callous) 03/18/22 0944   Margins Attached edges 02/18/22 1104   Number of days: 88     Procedures done during this encounter:   Debridement: Excisional Debridement  Indications:  Based on my examination of this patient's wound(s)/ulcer(s) today, debridement is required to promote healing and evaluate the wound base. Risks and benefits discussed with patient who has agreed to proceed. Performed by: Shazia Chin MD  Consent obtained:  Yes  Time out taken:  Yes  Pain Control: Anesthetic  Anesthetic: 4% Lidocaine Cream   Using curette, #15 blade scalpel and forceps the wound(s)/ulcer(s) was/were debrided down through and including the removal of Subcutaneous tissue on wound #5, muscle fascia on wound #2. Devitalized Tissue Debrided:  biofilm, slough, necrotic/eschar, exudate and callus  Pre Debridement Measurements:  Are located in the Wound/Ulcer Documentation Flow Sheet  Wound/Ulcer #: 2 and 5  Post Debridement Measurements:  Wound/Ulcer Descriptions are Pre Debridement except measurements: Total Surface Area Debrided: Wound #5 0.96 sq cm (sq) and wound #2 2.16 sq cm (fascia/muscle)  Diabetic/Pressure/Non Pressure Ulcers only:  Ulcer: Pressure ulcer, Stage 3 and Pressure ulcer, Stage 4   Estimated Blood Loss:  Minimal  Hemostasis Achieved:  by pressure  Procedural Pain:  0  / 10   Post Procedural Pain:  0 / 10   Response to treatment:  Well tolerated by patient. HISTORY of PRESENT ILLNESS HPI     Millicent Brantley is a 80 y.o. female who presents today for wound/ulcer evaluation. History of Wound Context: Per original history and physical on this patient. Changes in history since last exam: none    Objective:    /73   Pulse 80   Temp 98 °F (36.7 °C) (Oral)   Resp 18   Ht 5' 8\" (1.727 m)   Wt 150 lb (68 kg)   BMI 22.81 kg/m²   Wt Readings from Last 3 Encounters:   03/18/22 150 lb (68 kg)   03/04/22 148 lb 9.6 oz (67.4 kg)   02/22/22 147 lb (66.7 kg)       PHYSICAL EXAM  General: alert and in no acute distress.  Normal appearing  Skin: warm and dry, no rash  Head: normocephalic and atraumatic  Eyes: extraocular eye movements intact, conjunctivae normal, and sclera anicteric  ENT: hearing grossly normal bilaterally. Normal appearance  Respiratory: no chest wall tenderness. no respiratory distress  GI: abdomen soft, non-tender and non-distended, benign  Musculoskeletal: baseline range of motion in joints. Nontender calves. No cyanosis. Edema 2+. Neurologic: Speech normal. No focal deficits. Mental status normal or at baseline    PAST MEDICAL HISTORY        Diagnosis Date    Acute on chronic diastolic heart failure due to coronary artery disease ([facility])     Acute respiratory failure with hypoxia ([facility])     Atherosclerosis of native artery of right lower extremity with rest pain ([address] 07/25/2017    Back pain     Branch retinal vein occlusion 07/20/2012    Bronchiectasis with acute exacerbation ([facility])     Cellulitis and abscess of left leg 7/11/2021    Cellulitis of left lower extremity 7/11/2021    S/P vein harvest 05/2021 for CABG    CHF (congestive heart failure) ([facility])     Closed compression fracture of thoracic vertebra ([address] 01/15/2020    Closed fracture of facial bone with routine healing 11/21/2016    Closed jaw fracture ([address] 01/15/2020    Community acquired pneumonia of left lower lobe of lung     Compression fracture of L1 lumbar vertebra ([address] 01/15/2020    COPD (chronic obstructive pulmonary disease) ([facility])     COVID     Fracture of tibial plateau, closed, left, initial encounter 12/05/2017    HCAP (healthcare-associated pneumonia)     Minimally displaced zone I fracture of sacrum ([address] 09/02/2020    MRSA (methicillin resistant staph aureus) culture positive 07/2021    MRSA (methicillin resistant Staphylococcus aureus) 07/13/2021    wound    Mucus plugging of bronchi     NSTEMI (non-ST elevated myocardial infarction) ([address] 4/23/2021    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 Bone density test last year showed severe osteoporosis. Recently, two broken vertebrae (L1, L2) due to coughing. Diagnosed with tracheomalacia and stated she must cough very hard to clear phlegm. Was coughing due to upper respiratory infections which have been treated. Hx of laminectomy and recent kyphoplasty.    Refractured her jawbone which was previously repaired wi    Post herpetic neuralgia     Proximal humerus fracture 10/01/2019    Rheumatoid arthritis ([address]     Shingles 05/2020    Sleep apnea     Status post incision and drainage 07/2021    Left Leg    Surgical wound dehiscence, initial encounter (at [facility] harvest site) 7/12/2021    Temporal arteritis ([facility] 07/10/2013    Tobacco use 10/19/2017    Tracheomalacia     Vitreous hemorrhage, right eye ([address] 02/21/2020       PAST SURGICAL HISTORY    Past Surgical History:   Procedure Laterality Date    ARTERY BIOPSY Right 03/01/2021    at [address] [facility] [address]  05/30/2013    left temporal artery biopsy    BACK SURGERY  08/2020    BRONCHOSCOPY      BRONCHOSCOPY N/A 06/12/2019    BRONCHOSCOPY ALVEOLAR LAVAGE performed by Lily Grace MD at [address]  05/03/2021    CATARACT REMOVAL      COLONOSCOPY      CORONARY ARTERY BYPASS GRAFT N/A 05/03/2021    CORONARY ARTERY BYPASS X3 WITH LEFT ATRIAL APPENDAGE CLIP, 5 LEVEL BILATERAL INTERCOSTAL NERVE BLOCK, STERNAL PLATING performed by Andrew Grimes MD at [address]      IR MIDLINE CATH  7/14/2021    IR MIDLINE CATH 7/14/2021 [facility] SPECIAL PROCEDURES    KNEE ARTHROSCOPY      left    KYPHOSIS SURGERY      LAMINECTOMY      LEG SURGERY Left 7/13/2021    INCISION AND DEBRIDEMENT LEFT LOWER LEG WOUND WITH POSSIBLE WOUND VAC PLACEMENT performed by Dewayne Torres MD at [address]  02/2020    MEDIASTINOSCOPY N/A 05/05/2021    MEDIASTINAL EXPLORATION AND EVACUATION OF HEMATOMA performed by Elisabeth Norton Paresh Chou MD at [facility]  2021    sacral wound debridement    PRESSURE ULCER DEBRIDEMENT N/A 2021    SACRAL WOUND DEBRIDEMENT performed by Abiola Pastor MD at [address] SEPTOPLASTY  2013    FESS with balloon    SPINAL FUSION      TRANSESOPHAGEAL ECHOCARDIOGRAM  2021    TUBAL LIGATION      UPPER GASTROINTESTINAL ENDOSCOPY  2014    Dilitation       FAMILY HISTORY    Family History   Problem Relation Age of Onset    Diabetes Mother     Hypertension Mother     Asthma Other     Heart Disease Brother     Diabetes Brother     Diabetes Sister     Heart Disease Brother     Cancer Neg Hx     Emphysema Neg Hx     Heart Failure Neg Hx        SOCIAL HISTORY    Social History     Tobacco Use    Smoking status: Former Smoker     Packs/day: 1.00     Years: 20.00     Pack years: 20.00     Types: Cigarettes     Quit date: 1991     Years since quittin.9    Smokeless tobacco: Never Used   Vaping Use    Vaping Use: Never used   Substance Use Topics    Alcohol use: Not Currently     Alcohol/week: 0.0 standard drinks     Comment: rarely    Drug use: No       ALLERGIES    Allergies   Allergen Reactions    Atenolol Other (See Comments)     Cough         MEDICATIONS    Current Outpatient Medications on File Prior to Encounter   Medication Sig Dispense Refill    midodrine (PROAMATINE) 5 MG tablet Take 1 tablet by mouth 2 times daily 90 tablet 0    torsemide (DEMADEX) 20 MG tablet Take 2 tablets by mouth daily If weight 131 lbs or less, decrease to 20 mg daily 180 tablet 3    metoprolol succinate (TOPROL XL) 25 MG extended release tablet Take 0.5 tablets by mouth daily 45 tablet 3    spironolactone (ALDACTONE) 25 MG tablet Take 1 tablet by mouth daily 90 tablet 3    clopidogrel (PLAVIX) 75 MG tablet Take 1 tablet by mouth daily 90 tablet 3    azithromycin (ZITHROMAX) 250 MG tablet Take 1 tablet by mouth daily 90 tablet 3    predniSONE (DELTASONE) 1 MG tablet Take 4 mg by mouth daily      ondansetron (ZOFRAN) 4 MG tablet every 8 hours as needed       oxyCODONE-acetaminophen (PERCOCET)  MG per tablet Take 1 tablet by mouth daily.  insulin glargine (LANTUS) 100 UNIT/ML injection vial Inject 30 Units into the skin nightly 1 pen 0    DALIRESP 500 MCG tablet TAKE 1 TABLET BY MOUTH DAILY 30 tablet 11    docusate sodium (COLACE) 100 MG capsule Take 100 mg by mouth 2 times daily as needed for Constipation      gabapentin (NEURONTIN) 300 MG capsule Take 300 mg by mouth 3 times daily.  latanoprost (XALATAN) 0.005 % ophthalmic solution Place 1 drop into both eyes nightly      NARCAN 4 MG/0.1ML LIQD nasal spray as needed       morphine (MS CONTIN) 15 MG extended release tablet 15 mg 2 times daily.  ipratropium (ATROVENT) 0.06 % nasal spray USE 2 SPRAYS BY NASAL ROUTE 2-4 TIMES DAILY (Patient taking differently: 3 times daily as needed ) 1 Bottle 5    albuterol sulfate  (90 Base) MCG/ACT inhaler INHALE 2 PUFFS INTO THE LUNGS EVERY 4 HOURS AS NEEDED FOR WHEEZING 1 Inhaler 5    vitamin D (CHOLECALCIFEROL) 1000 UNIT TABS tablet Take 5,000 Units by mouth daily       calcium carbonate (OSCAL) 500 MG TABS tablet Take 500 mg by mouth daily      atorvastatin (LIPITOR) 40 MG tablet Take 40 mg by mouth      Insulin Syringe-Needle U-100 31G X 5/16\" 0.5 ML MISC USE 5 TIMES DAILY      ACCU-CHEK CEDRICK PLUS strip TEST 4 TIMES DAILY  3    aspirin EC 81 MG EC tablet Take 1 tablet by mouth daily      insulin lispro (HUMALOG) 100 UNIT/ML injection vial Inject 0-12 Units into the skin 3 times daily (with meals)      Misc. Devices (ACAPELLA) MISC Take 1 Device by mouth as needed 1 each 0    fluticasone (FLONASE) 50 MCG/ACT nasal spray INHALE 2 SPRAYS IN EACH NOSTRIL DAILY 1 Bottle 5    cetirizine (ZYRTEC) 10 MG tablet Take 10 mg by mouth daily.         omeprazole (PRILOSEC) 40 MG capsule Take 40 mg by mouth daily        No current facility-administered medications on file prior to encounter. REVIEW OF SYSTEMS  A comprehensive review of systems was negative except for what has been indicated above and: none    Written patient dismissal instructions given to patient and signed by patient or POA. Discharge [facility] Physician Orders and Discharge [address]  [address], [address]  [place], [facility]  Telephone: [phone]      Fax: [phone] home care [address]  Nurse Yeyo De Leon [phone]      Your wound-care supplies will be provided by: [employer] orders supplies through [employer]. Please note, depending on your insurance coverage, you may have out-of-pocket expenses for these supplies. Someone from [place] may call you to confirm your order and discuss those potential costs before they ship your products -- please anticipate that call. If your out-of-pocket cost is going to be less than $200, and [facility] cannot reach you, they may ship your supplies as soon as the order is processed, and will send you a bill. If your out-of-pocket cost is going to be more than $200, [employer] should not ship your supplies until they speak with you. If you have any questions about your supplies or your potential out-of-pocket costs, or if you need to place an order for a refill of supplies (typically monthly), Barbi Rahman is your local representative, and can be reached at [phone]. Information on [employer]'s return policy will also be enclosed with your supplies -- please read that carefully before opening your items.      NAME:  Gris Taveras   DATE of BIRTH:  1950  PRIMARY DIAGNOSIS FOR WOUND CARE CENTER:  Pressure ulcer     Wound cleansing:   Do not scrub or use excessive force. Wash hands with soap and water before and after dressing changes.   Prior to applying a clean dressing, cleanse wound with normal saline, wound cleanser, or mild soap and water. Ask your physician or nurse before getting the wound(s) wet in the shower.                Wound care for home:        Left Medial Heel, Deep tissue injury:  Triad or Zinc oxide to maegan wound to protect  Hydrofera Blue to wound  Cover with a dry dressing  Hydofera Blue Dressings- Antimicrobial Dressing           If it's BLUE- it's not DUE (to be Changed)           KEEP MOIST and change in 72 hours           If it's WHITE- lt's lost its Fight           Change the dressing  If stays blue you can change every 3 days       Sacral Wound:   Vashe or Hypochlorous acid soaked gauze applied to wound for 2-3 minutes, no need to rinse  Triad or Zinc Oxide to maegan-wound  Multidex Powder then Collagen with silver to wound bed & tuck into depth of wound  Fluff gauze over wound  Cover with small sacral mepilex border  HHC to change dressing on Monday, Wednesday, Friday next week        Please note, all wounds (unless stated otherwise here) were mechanically debrided at the time of cleansing here in the wound-care center today, so a small amount of pain, drainage or bleeding from that process might be expected, and is normal.      All products for home use, including multiple products for a single wound if applicable, are medically necessary in order to achieve the best chance at timely wound healing. See provider documentation for details if needed.     Substituted dressings applied in the [facility] today, if applicable:      [facility]     New orders for this week (labs, imaging, medications, etc.):             Additional instructions for specific diagnoses:     +ROHO cushion- use at [address] when sitting!!  +True Balance Air group II mattress      General comments for pressure ulcers:  *  Make sure you stay well-hydrated, and maintain good protein intake. *  Reposition at least every two hours, to keep from having pressure in one spot for too long.   *  If you are not sure whether you have the best offloading surfaces (mattress, mattress overlay, chair cushion, heel-protector boots, etc), please ask. *  Moisturize your skin regularly with Vaseline, Aquaphor, Aveeno, CeraVe, Cetaphil, Eucerin, Lubriderm, etc; but keep the skin between your toes dry. *  If you smoke, your wound can not heal properly -- please talk with us when you're ready to quit.         F/U Appointment is with Dr. Cindy Grubbs in 2 weeks on                                                at                       .     Your nurse  is Nikki Molina RN      If we applied slip-resistant hospital socks today, be sure to remove them at least once a day to inspect your toes or feet, even if you're not changing the wraps or dressings underneath.  If you see anything concerning (redness, excess moisture, etc), please call and let us know right away.     Should you experience any significant changes in your wound(s) (including redness, increased warmth, increased pain, increased drainage, odor, or fever) or have questions about your wound care, please contact the [facility] at [phone] Monday-Thursday from 8:00 am - 4:30 pm, or Friday from 8:00 am - 2:30 pm.  If you need help with your wound outside these hours and cannot wait until we are again available, contact your home-care company (if applicable), your PCP, or go to the nearest emergency room        Electronically signed by Ashley Fay MD on 3/18/2022 at 10:17 AM

## 2022-03-18 NOTE — TELEPHONE ENCOUNTER
----- Message from Yesenia Landon MD sent at 3/17/2022  5:57 PM EDT -----  Please let patient know I got a chest x-ray result my box.   I do not remember ordering this but let her know it still shows continued issues from her previous infection, may be chronic but would be best to d/w PCP how to proceed

## 2022-03-18 NOTE — TELEPHONE ENCOUNTER
Spoke wit pt who states she got the labs yesterday. She was with her granddaughter and knew her glucose was low so she she ate something. She is doing well today.  Advised her to follow up with PCP regarding glucose

## 2022-03-18 NOTE — PROGRESS NOTES
[facility] Dis[facility] [address] [facility]  [address], [address]  [place], [facility]  Telephone: [phone]      Fax: [phone] home care Madiha Escobedo  Nurse Judee Burkitt [phone]      Your wound-care supplies will be provided by: [facility] AT [facility] orders supplies through [employer]. Please note, depending on your insurance coverage, you may have out-of-pocket expenses for these supplies. Someone from [facility] may call you to confirm your order and discuss those potential costs before they ship your products -- please anticipate that call. If your out-of-pocket cost is going to be less than $200, and [employer] cannot reach you, they may ship your supplies as soon as the order is processed, and will send you a bill. If your out-of-pocket cost is going to be more than $200, [employer] should not ship your supplies until they speak with you. If you have any questions about your supplies or your potential out-of-pocket costs, or if you need to place an order for a refill of supplies (typically monthly), Bryce Parker is your local representative, and can be reached at [phone]. Information on [employer]'s return policy will also be enclosed with your supplies -- please read that carefully before opening your items.      NAME:  Gris Taveras   DATE of BIRTH:  1950  PRIMARY DIAGNOSIS FOR WOUND CARE CENTER:  Pressure ulcer     Wound cleansing:   Do not scrub or use excessive force. Wash hands with soap and water before and after dressing changes. Prior to applying a clean dressing, cleanse wound with normal saline, wound cleanser, or mild soap and water.  Ask your physician or nurse before getting the wound(s) wet in the shower.                Wound care for home:        Left Medial Heel, Deep tissue injury:  Triad or Zinc oxide to maegan wound to protect  Hydrofera Blue to wound  Cover with a dry dressing  Hydofera Blue Dressings- Antimicrobial Dressing           If it's BLUE- it's not DUE (to be Changed)           KEEP MOIST and change in 72 hours           If it's WHITE- lt's lost its Fight           Change the dressing  If stays blue you can change every 3 days       Sacral Wound:   Vashe or Hypochlorous acid soaked gauze applied to wound for 2-3 minutes, no need to rinse  Triad or Zinc Oxide to maegan-wound  Multidex Powder then Collagen with silver to wound bed & tuck into depth of wound  Fluff gauze over wound  Cover with small sacral mepilex border  C to change dressing on Monday, Wednesday, Friday next week        Please note, all wounds (unless stated otherwise here) were mechanically debrided at the time of cleansing here in the wound-care center today, so a small amount of pain, drainage or bleeding from that process might be expected, and is normal.      All products for home use, including multiple products for a single wound if applicable, are medically necessary in order to achieve the best chance at timely wound healing. See provider documentation for details if needed.     Substituted dressings applied in the [facility] today, if applicable:      [facility]     New orders for this week (labs, imaging, medications, etc.):             Additional instructions for specific diagnoses:     +ROHO cushion- use at [address] when sitting!!  +True Balance Air group II mattress      General comments for pressure ulcers:  *  Make sure you stay well-hydrated, and maintain good protein intake. *  Reposition at least every two hours, to keep from having pressure in one spot for too long. *  If you are not sure whether you have the best offloading surfaces (mattress, mattress overlay, chair cushion, heel-protector boots, etc), please ask. *  Moisturize your skin regularly with Vaseline, Aquaphor, Aveeno, CeraVe, Cetaphil, Eucerin, Lubriderm, etc; but keep the skin between your toes dry.   *  If you smoke, your wound can not heal properly -- please talk with us when you're ready to quit.         F/U Appointment is with Dr. Annie Cantrell in 2 weeks on                                                at                       .     Your nurse  is Violetta Severance, RN      If we applied slip-resistant hospital socks today, be sure to remove them at least once a day to inspect your toes or feet, even if you're not changing the wraps or dressings underneath.  If you see anything concerning (redness, excess moisture, etc), please call and let us know right away.     Should you experience any significant changes in your wound(s) (including redness, increased warmth, increased pain, increased drainage, odor, or fever) or have questions about your wound care, please contact the [address] [place] [address] at [phone] Monday-Thursday from 8:00 am - 4:30 pm, or Friday from 8:00 am - 2:30 pm.  If you need help with your wound outside these hours and cannot wait until we are again available, contact your home-care company (if applicable), your PCP, or go to the nearest emergency room

## 2022-03-18 NOTE — TELEPHONE ENCOUNTER
The Lab called to report a critical lab result. Pt's Glucose level today was 46. Please advise. Last OV 02/22/2022 with BHAVIK.

## 2022-03-18 NOTE — TELEPHONE ENCOUNTER
----- Message from Avila Hooks MD sent at 3/18/2022 10:08 AM EDT -----  Sent a message to my nurse Brenda. Patient glucose is critically low with this is not a cardiac problem.   Please reach out to her and see how she is feeling if she is feeling fairly poor she can try sugary-containing lets or go to emergency room otherwise she should reach out to her PCP urgently to amanda

## 2022-03-18 NOTE — TELEPHONE ENCOUNTER
Note in the future/critical results should be called to me and not left on the message board. To back within 1 hour and see if we can reach her otherwise try family members. IF we cannot reach her, we may have to calls 's office to  do a home check on patient for safety.

## 2022-03-21 NOTE — PROGRESS NOTES
Wounds debrided per MD and patient toleratd well. No changes in sacral dressings but MD is changing primary dressing to Hydrofera blue for the LE wound. Discharge instructions reviewed with patient, all questions answered, copy given to patient. Dressings were applied to all wounds per M.D. Instructions at this visit.

## 2022-03-24 ENCOUNTER — HOSPITAL ENCOUNTER (OUTPATIENT)
Dept: NON INVASIVE DIAGNOSTICS | Age: 72
Discharge: HOME | End: 2022-03-24
Payer: MEDICARE

## 2022-03-24 DIAGNOSIS — R06.02 SOB (SHORTNESS OF BREATH): ICD-10-CM

## 2022-03-24 LAB
LV EF: 33 %
LVEF MODALITY: NORMAL

## 2022-03-24 PROCEDURE — 93306 TTE W/DOPPLER COMPLETE: CPT

## 2022-03-25 ENCOUNTER — TELEPHONE (OUTPATIENT)
Dept: CARDIOLOGY CLINIC | Age: 72
End: 2022-03-25

## 2022-03-25 NOTE — TELEPHONE ENCOUNTER
----- Message from Anna Lujan MD sent at 3/24/2022  5:47 PM EDT -----  Let patient know echo test shows stable reduced heart function, no new orders or changes at this time. Thanks.

## 2022-04-01 ENCOUNTER — HOSPITAL ENCOUNTER (OUTPATIENT)
Dept: WOUND CARE | Age: 72
Discharge: HOME | End: 2022-04-01
Payer: MEDICARE

## 2022-04-01 VITALS
RESPIRATION RATE: 20 BRPM | BODY MASS INDEX: 23.19 KG/M2 | WEIGHT: 153 LBS | SYSTOLIC BLOOD PRESSURE: 147 MMHG | HEIGHT: 68 IN | HEART RATE: 81 BPM | TEMPERATURE: 96.7 F | DIASTOLIC BLOOD PRESSURE: 69 MMHG

## 2022-04-01 DIAGNOSIS — I87.2 VENOUS INSUFFICIENCY OF LEFT LOWER EXTREMITY: ICD-10-CM

## 2022-04-01 DIAGNOSIS — L89.154 PRESSURE ULCER OF COCCYGEAL REGION, STAGE 4 (HCC): ICD-10-CM

## 2022-04-01 DIAGNOSIS — L92.9 HYPERGRANULATION: ICD-10-CM

## 2022-04-01 DIAGNOSIS — L89.623 PRESSURE ULCER OF LEFT HEEL, STAGE 3 (HCC): Primary | ICD-10-CM

## 2022-04-01 PROCEDURE — 29581 APPL MULTLAYER CMPRN SYS LEG: CPT | Performed by: INTERNAL MEDICINE

## 2022-04-01 PROCEDURE — 17250 CHEM CAUT OF GRANLTJ TISSUE: CPT

## 2022-04-01 PROCEDURE — 97597 DBRDMT OPN WND 1ST 20 CM/<: CPT | Performed by: INTERNAL MEDICINE

## 2022-04-01 PROCEDURE — 97597 DBRDMT OPN WND 1ST 20 CM/<: CPT

## 2022-04-01 PROCEDURE — 29581 APPL MULTLAYER CMPRN SYS LEG: CPT

## 2022-04-01 RX ORDER — LIDOCAINE 50 MG/G
OINTMENT TOPICAL ONCE
Status: DISCONTINUED | OUTPATIENT
Start: 2022-04-01 | End: 2022-04-02 | Stop reason: HOSPADM

## 2022-04-01 RX ORDER — BACITRACIN ZINC AND POLYMYXIN B SULFATE 500; 1000 [USP'U]/G; [USP'U]/G
OINTMENT TOPICAL ONCE
Status: DISCONTINUED | OUTPATIENT
Start: 2022-04-01 | End: 2022-04-02 | Stop reason: HOSPADM

## 2022-04-01 RX ORDER — LIDOCAINE HYDROCHLORIDE 40 MG/ML
SOLUTION TOPICAL ONCE
Status: DISCONTINUED | OUTPATIENT
Start: 2022-04-01 | End: 2022-04-02 | Stop reason: HOSPADM

## 2022-04-01 RX ORDER — LIDOCAINE 40 MG/G
CREAM TOPICAL ONCE
Status: DISCONTINUED | OUTPATIENT
Start: 2022-04-01 | End: 2022-04-02 | Stop reason: HOSPADM

## 2022-04-01 ASSESSMENT — PAIN DESCRIPTION - LOCATION: LOCATION: FOOT

## 2022-04-01 ASSESSMENT — PAIN SCALES - GENERAL: PAINLEVEL_OUTOF10: 7

## 2022-04-01 ASSESSMENT — PAIN DESCRIPTION - FREQUENCY: FREQUENCY: INTERMITTENT

## 2022-04-01 ASSESSMENT — PAIN DESCRIPTION - PROGRESSION: CLINICAL_PROGRESSION: NOT CHANGED

## 2022-04-01 ASSESSMENT — PAIN DESCRIPTION - PAIN TYPE: TYPE: ACUTE PAIN

## 2022-04-01 ASSESSMENT — PAIN DESCRIPTION - DESCRIPTORS: DESCRIPTORS: SHOOTING;SHARP

## 2022-04-01 ASSESSMENT — PAIN DESCRIPTION - ORIENTATION: ORIENTATION: LEFT

## 2022-04-01 ASSESSMENT — PAIN DESCRIPTION - ONSET: ONSET: ON-GOING

## 2022-04-01 NOTE — PROGRESS NOTES
215 [facility] Physician Orders and Discharge 800 [facility], [address]  [facility]  Telephone: [phone]      Fax: [phone] home care Madiha 9  Nurse Ruba Lara [phone]      Your wound-care supplies will be provided by: [employer] orders supplies through [employer]. Please note, depending on your insurance coverage, you may have out-of-pocket expenses for these supplies. Someone from [facility] may call you to confirm your order and discuss those potential costs before they ship your products -- please anticipate that call. If your out-of-pocket cost is going to be less than $200, and [employer] cannot reach you, they may ship your supplies as soon as the order is processed, and will send you a bill. If your out-of-pocket cost is going to be more than $200, [employer] should not ship your supplies until they speak with you. If you have any questions about your supplies or your potential out-of-pocket costs, or if you need to place an order for a refill of supplies (typically monthly), Lisa Morocho is your local representative, and can be reached at [phone]. Information on [employer]'s return policy will also be enclosed with your supplies -- please read that carefully before opening your items.      NAME:  Gris Taveras   DATE of BIRTH:  1950  PRIMARY DIAGNOSIS FOR WOUND CARE CENTER:  Pressure ulcer     Wound cleansing:   Do not scrub or use excessive force. Wash hands with soap and water before and after dressing changes. Prior to applying a clean dressing, cleanse wound with normal saline, wound cleanser, or mild soap and water.  Ask your physician or nurse before getting the wound(s) wet in the shower.                Wound care for home:        Left Medial Heel, Deep tissue injury:  Vashe  Calmoseptine  Collagen  Double layer of plain foam  Compri 2 Lite  Keep dressing clean, dry and in place until next weeks visit          Sacral Wound:   Dr. Tatum Chew used Silver Nitrate on your wound today. The wound will be black or silver in appearance  for the next several days, this is normal.   Vashe or Hypochlorous acid soaked gauze applied to wound for 2-3 minutes, no need to rinse  Triad or Zinc Oxide to maegan-wound  Multidex Powder then Collagen with silver to wound bed & tuck into depth of wound  Fluff gauze over wound  Cover with small sacral mepilex border  [facility] to change dressing on Monday, Wednesday, Friday next week        Please note, all wounds (unless stated otherwise here) were mechanically debrided at the time of cleansing here in the wound-care center today, so a small amount of pain, drainage or bleeding from that process might be expected, and is normal.      All products for home use, including multiple products for a single wound if applicable, are medically necessary in order to achieve the best chance at timely wound healing. See provider documentation for details if needed.     Substituted dressings applied in the [facility] today, if applicable:      [facility]     New orders for this week (labs, imaging, medications, etc.):             Additional instructions for specific diagnoses:     +ROHO cushion- use at [address] when sitting!!  +True Balance Air group II mattress      General comments for pressure ulcers:  *  Make sure you stay well-hydrated, and maintain good protein intake. *  Reposition at least every two hours, to keep from having pressure in one spot for too long. *  If you are not sure whether you have the best offloading surfaces (mattress, mattress overlay, chair cushion, heel-protector boots, etc), please ask. *  Moisturize your skin regularly with Vaseline, Aquaphor, Aveeno, CeraVe, Cetaphil, Eucerin, Lubriderm, etc; but keep the skin between your toes dry.   *  If you smoke, your wound can not heal properly -- please talk with us when you're ready to quit.         F/U Appointment is with Dr. Collin Shirley in 1 week on                                                at                       .     Your nurse  is Nahed Sol RN      If we applied slip-resistant hospital socks today, be sure to remove them at least once a day to inspect your toes or feet, even if you're not changing the wraps or dressings underneath.  If you see anything concerning (redness, excess moisture, etc), please call and let us know right away.     Should you experience any significant changes in your wound(s) (including redness, increased warmth, increased pain, increased drainage, odor, or fever) or have questions about your wound care, please contact the [employer] at [phone] Monday-Thursday from 8:00 am - 4:30 pm, or Friday from 8:00 am - 2:30 pm.  If you need help with your wound outside these hours and cannot wait until we are again available, contact your home-care company (if applicable), your PCP, or go to the nearest emergency room

## 2022-04-08 ENCOUNTER — HOSPITAL ENCOUNTER (OUTPATIENT)
Dept: WOUND CARE | Age: 72
Discharge: HOME | End: 2022-04-08
Payer: MEDICARE

## 2022-04-08 VITALS
HEIGHT: 68 IN | TEMPERATURE: 97.3 F | SYSTOLIC BLOOD PRESSURE: 159 MMHG | RESPIRATION RATE: 20 BRPM | WEIGHT: 151 LBS | DIASTOLIC BLOOD PRESSURE: 81 MMHG | HEART RATE: 83 BPM | BODY MASS INDEX: 22.88 KG/M2

## 2022-04-08 DIAGNOSIS — L89.154 PRESSURE ULCER OF COCCYGEAL REGION, STAGE 4 (HCC): ICD-10-CM

## 2022-04-08 DIAGNOSIS — L89.623 PRESSURE ULCER OF LEFT HEEL, STAGE 3 (HCC): Primary | ICD-10-CM

## 2022-04-08 DIAGNOSIS — L92.9 HYPERGRANULATION: ICD-10-CM

## 2022-04-08 PROCEDURE — 29581 APPL MULTLAYER CMPRN SYS LEG: CPT

## 2022-04-08 PROCEDURE — 97597 DBRDMT OPN WND 1ST 20 CM/<: CPT | Performed by: INTERNAL MEDICINE

## 2022-04-08 PROCEDURE — 29581 APPL MULTLAYER CMPRN SYS LEG: CPT | Performed by: INTERNAL MEDICINE

## 2022-04-08 PROCEDURE — 97597 DBRDMT OPN WND 1ST 20 CM/<: CPT

## 2022-04-08 RX ORDER — LIDOCAINE 40 MG/G
CREAM TOPICAL ONCE
Status: DISCONTINUED | OUTPATIENT
Start: 2022-04-08 | End: 2022-04-09 | Stop reason: HOSPADM

## 2022-04-08 ASSESSMENT — PAIN DESCRIPTION - PROGRESSION: CLINICAL_PROGRESSION: GRADUALLY IMPROVING

## 2022-04-08 ASSESSMENT — PAIN SCALES - GENERAL: PAINLEVEL_OUTOF10: 0

## 2022-04-08 ASSESSMENT — PAIN - FUNCTIONAL ASSESSMENT: PAIN_FUNCTIONAL_ASSESSMENT: PREVENTS OR INTERFERES SOME ACTIVE ACTIVITIES AND ADLS

## 2022-04-08 NOTE — PROGRESS NOTES
215 [facility] Physician Orders and Discharge [address]  [address], [address]  [place], [facility]  Telephone: [phone]      Fax: [phone] home care Madiha Escobedo  Nurse Yovana [phone]      Your wound-care supplies will be provided by: [employer] orders supplies through [employer]. Please note, depending on your insurance coverage, you may have out-of-pocket expenses for these supplies. Someone from [facility] may call you to confirm your order and discuss those potential costs before they ship your products -- please anticipate that call. If your out-of-pocket cost is going to be less than $200, and [employer] cannot reach you, they may ship your supplies as soon as the order is processed, and will send you a bill. If your out-of-pocket cost is going to be more than $200, [employer] should not ship your supplies until they speak with you. If you have any questions about your supplies or your potential out-of-pocket costs, or if you need to place an order for a refill of supplies (typically monthly), Adair Chu is your local representative, and can be reached at [phone]. Information on [employer]'s return policy will also be enclosed with your supplies -- please read that carefully before opening your items.      NAME:  Gris Taveras   DATE of BIRTH:  1950  PRIMARY DIAGNOSIS FOR WOUND CARE CENTER:  Pressure ulcer     Wound cleansing:   Do not scrub or use excessive force. Wash hands with soap and water before and after dressing changes. Prior to applying a clean dressing, cleanse wound with normal saline, wound cleanser, or mild soap and water.  Ask your physician or nurse before getting the wound(s) wet in the shower.                Wound care for home:        Left Medial Heel, Deep tissue injury:  Aquaphor to dry skin of lower leg  Vashe  Calmoseptine  Collagen  Double layer of plain foam  Compri 2 Lite  Keep dressing clean, dry and in place until next weeks visit           Sacral Wound:   Vashe or Hypochlorous acid soaked gauze applied to wound for 2-3 minutes, no need to rinse  Triad or Zinc Oxide to maegan-wound  Multidex Powder then Collagen with silver to wound bed & tuck into depth of wound  Fluff gauze over wound  Cover with small sacral mepilex border  [facility] to change dressing on Monday, Wednesday, Friday next week        Please note, all wounds (unless stated otherwise here) were mechanically debrided at the time of cleansing here in the wound-care center today, so a small amount of pain, drainage or bleeding from that process might be expected, and is normal.      All products for home use, including multiple products for a single wound if applicable, are medically necessary in order to achieve the best chance at timely wound healing. See provider documentation for details if needed.     Substituted dressings applied in the [address] [place] [address],[address] today, if applicable:      [facility]     New orders for this week (labs, imaging, medications, etc.):             Additional instructions for specific diagnoses:     +ROHO cushion- use at [address] when sitting!!  +True Balance Air group II mattress      General comments for pressure ulcers:  *  Make sure you stay well-hydrated, and maintain good protein intake. *  Reposition at least every two hours, to keep from having pressure in one spot for too long. *  If you are not sure whether you have the best offloading surfaces (mattress, mattress overlay, chair cushion, heel-protector boots, etc), please ask. *  Moisturize your skin regularly with Vaseline, Aquaphor, Aveeno, CeraVe, Cetaphil, Eucerin, Lubriderm, etc; but keep the skin between your toes dry.   *  If you smoke, your wound can not heal properly -- please talk with us when you're ready to quit.         F/U Appointment is with Dr. Jose Roy in 1 week on [facility]                       .     Your nurse  is ABRAHAM Martinez      If we applied slip-resistant hospital socks today, be sure to remove them at least once a day to inspect your toes or feet, even if you're not changing the wraps or dressings underneath.  If you see anything concerning (redness, excess moisture, etc), please call and let us know right away.     Should you experience any significant changes in your wound(s) (including redness, increased warmth, increased pain, increased drainage, odor, or fever) or have questions about your wound care, please contact the [address] [place] [address] at [phone] Monday-Thursday from 8:00 am - 4:30 pm, or Friday from 8:00 am - 2:30 pm.  If you need help with your wound outside these hours and cannot wait until we are again available, contact your home-care company (if applicable), your PCP, or go to the nearest emergency room

## 2022-04-08 NOTE — PLAN OF CARE
No changes in overall wound care regime, both wounds are improving. Debridment per MD today and patient tolerated well. Discharge instructions reviewed with patient, all questions answered, copy given to patient. Dressings were applied to all wounds per M.D. Instructions at this visit.

## 2022-04-15 ENCOUNTER — HOSPITAL ENCOUNTER (OUTPATIENT)
Dept: WOUND CARE | Age: 72
Discharge: HOME | End: 2022-04-15
Payer: MEDICARE

## 2022-04-15 VITALS
HEART RATE: 89 BPM | TEMPERATURE: 97.3 F | BODY MASS INDEX: 23.19 KG/M2 | HEIGHT: 68 IN | RESPIRATION RATE: 20 BRPM | DIASTOLIC BLOOD PRESSURE: 75 MMHG | WEIGHT: 153 LBS | SYSTOLIC BLOOD PRESSURE: 129 MMHG

## 2022-04-15 DIAGNOSIS — L92.9 HYPERGRANULATION: ICD-10-CM

## 2022-04-15 DIAGNOSIS — L89.623 PRESSURE ULCER OF LEFT HEEL, STAGE 3 (HCC): Primary | ICD-10-CM

## 2022-04-15 DIAGNOSIS — L89.154 PRESSURE ULCER OF COCCYGEAL REGION, STAGE 4 (HCC): ICD-10-CM

## 2022-04-15 PROCEDURE — 97597 DBRDMT OPN WND 1ST 20 CM/<: CPT | Performed by: INTERNAL MEDICINE

## 2022-04-15 PROCEDURE — 29581 APPL MULTLAYER CMPRN SYS LEG: CPT | Performed by: INTERNAL MEDICINE

## 2022-04-15 PROCEDURE — 17250 CHEM CAUT OF GRANLTJ TISSUE: CPT | Performed by: INTERNAL MEDICINE

## 2022-04-15 PROCEDURE — 29581 APPL MULTLAYER CMPRN SYS LEG: CPT

## 2022-04-15 PROCEDURE — 17250 CHEM CAUT OF GRANLTJ TISSUE: CPT

## 2022-04-15 PROCEDURE — 97597 DBRDMT OPN WND 1ST 20 CM/<: CPT

## 2022-04-15 RX ORDER — LIDOCAINE 50 MG/G
OINTMENT TOPICAL ONCE
Status: CANCELLED | OUTPATIENT
Start: 2022-04-15 | End: 2022-04-15

## 2022-04-15 RX ORDER — LIDOCAINE 40 MG/G
CREAM TOPICAL ONCE
Status: CANCELLED | OUTPATIENT
Start: 2022-04-15 | End: 2022-04-15

## 2022-04-15 RX ORDER — BACITRACIN ZINC AND POLYMYXIN B SULFATE 500; 1000 [USP'U]/G; [USP'U]/G
OINTMENT TOPICAL ONCE
Status: CANCELLED | OUTPATIENT
Start: 2022-04-15 | End: 2022-04-15

## 2022-04-15 RX ORDER — LIDOCAINE HYDROCHLORIDE 40 MG/ML
SOLUTION TOPICAL ONCE
Status: CANCELLED | OUTPATIENT
Start: 2022-04-15 | End: 2022-04-15

## 2022-04-15 RX ORDER — LIDOCAINE 40 MG/G
CREAM TOPICAL ONCE
Status: DISCONTINUED | OUTPATIENT
Start: 2022-04-15 | End: 2022-04-16 | Stop reason: HOSPADM

## 2022-04-15 ASSESSMENT — PAIN SCALES - GENERAL: PAINLEVEL_OUTOF10: 0

## 2022-04-15 NOTE — PLAN OF CARE
215 [facility] Physician Orders and Discharge 800 [facility]  [address], [address]  [place], [facility]  Telephone: [phone]      Fax: [phone]        Your home care company:   [employer] Ros[employer] [phone]      Your wound-care supplies will be provided by: [employer] orders supplies through [facility]. Please note, depending on your insurance coverage, you may have out-of-pocket expenses for these supplies. Someone from [facility] may call you to confirm your order and discuss those potential costs before they ship your products -- please anticipate that call. If your out-of-pocket cost is going to be less than $200, and [facility] cannot reach you, they may ship your supplies as soon as the order is processed, and will send you a bill. If your out-of-pocket cost is going to be more than $200, [facility] should not ship your supplies until they speak with you. If you have any questions about your supplies or your potential out-of-pocket costs, or if you need to place an order for a refill of supplies (typically monthly), Virginia Snider is your local representative, and can be reached at [phone]. Information on [facility]'s return policy will also be enclosed with your supplies -- please read that carefully before opening your items. NAME:  Radha Taveras   YOB: 1950  PRIMARY DIAGNOSIS FOR WOUND CARE CENTER:  Pressure ulcer     Wound cleansing:   Do not scrub or use excessive force. Wash hands with soap and water before and after dressing changes. Prior to applying a clean dressing, cleanse wound with normal saline, wound cleanser, or mild soap and water. Ask your physician or nurse before getting the wound(s) wet in the shower.                 Wound care for home:        Left Medial Heel, Deep tissue injury:  Aquaphor to dry skin of lower leg  Vashe  Calmoseptine  Collagen  Double layer of plain foam  Compri 2 Lite  Keep dressing clean, dry and in place until next weeks visit           Sacral Wound:   Vashe or Hypochlorous acid soaked gauze applied to wound for 2-3 minutes, no need to rinse  Triad or Zinc Oxide to maegan-wound  Multidex Powder then Collagen with silver to wound bed & tuck into depth of wound  Fluff gauze over wound  Cover with small sacral mepilex border  C to change dressing on Monday, Wednesday, Friday next week        Please note, all wounds (unless stated otherwise here) were mechanically debrided at the time of cleansing here in the wound-care center today, so a small amount of pain, drainage or bleeding from that process might be expected, and is normal.      All products for home use, including multiple products for a single wound if applicable, are medically necessary in order to achieve the best chance at timely wound healing. See provider documentation for details if needed. Substituted dressings applied in the [facility] today, if applicable:      [facility]     New orders for this week (labs, imaging, medications, etc.):             Additional instructions for specific diagnoses:     +ROHO cushion- use at [address] when sitting!!  +True Balance Air group II mattress      General comments for pressure ulcers: *  Make sure you stay well-hydrated, and maintain good protein intake. *  Reposition at least every two hours, to keep from having pressure in one spot for too long. *  If you are not sure whether you have the best offloading surfaces (mattress, mattress overlay, chair cushion, heel-protector boots, etc), please ask. *  Moisturize your skin regularly with Vaseline, Aquaphor, Aveeno, CeraVe, Cetaphil, Eucerin, Lubriderm, etc; but keep the skin between your toes dry. *  If you smoke, your wound can not heal properly -- please talk with us when you're ready to quit.          F/U Appointment is with Dr. Chapincito Nix in 1 week on                                                at                       . Your nurse  is ABRAHAM Martinez      If we applied slip-resistant hospital socks today, be sure to remove them at least once a day to inspect your toes or feet, even if you're not changing the wraps or dressings underneath. If you see anything concerning (redness, excess moisture, etc), please call and let us know right away.      Should you experience any significant changes in your wound(s) (including redness, increased warmth, increased pain, increased drainage, odor, or fever) or have questions about your wound care, please contact the [address] [place] [address] at [phone] Monday-Thursday from 8:00 am - 4:30 pm, or Friday from 8:00 am - 2:30 pm.  If you need help with your wound outside these hours and cannot wait until we are again available, contact your home-care company (if applicable), your PCP, or go to the nearest emergency room

## 2022-04-15 NOTE — PLAN OF CARE
Pt seen in [address] [place] [address],[address] - Sacral wound improved and Heel wound slow to improve - debride  per Dr. Jacque Carias - cont current treatment as follows  and instructions faxed to [address] [place], [place] [address]ee  Left Medial Heel, Deep tissue injury:  Aquaphor to dry skin of lower leg  Vashe  Calmoseptine  Collagen  Double layer of plain foam  Compri 2 Lite  Keep dressing clean, dry and in place until next weeks visit     Sacral Wound:   Vashe or Hypochlorous acid soaked gauze applied to wound for 2-3 minutes, no need to rinse  Triad or Zinc Oxide to maegan-wound  Multidex Powder then Collagen with silver to wound bed & tuck into depth of wound  Fluff gauze over wound  Cover with small sacral mepilex border    AVS reviewed - F/u in 1 week

## 2022-04-16 PROBLEM — E87.1 HYPONATREMIA: Status: RESOLVED | Noted: 2017-10-19 | Resolved: 2022-04-16

## 2022-04-16 PROBLEM — I87.2 VENOUS INSUFFICIENCY OF LEFT LOWER EXTREMITY: Status: ACTIVE | Noted: 2022-04-16

## 2022-04-16 NOTE — PROGRESS NOTES
[facility] Progress Note    Prakash Riddle     : 1950    DATE OF VISIT:  4/15/2022    Subjective:     Prakash Riddle is a 80 y.o. female who has a pressure ulcer located on the sacrum. Significant symptoms or pertinent wound history since last visit: feeling ok, some arthralgias up and down, eating well, no fever, tolerating weekly leg wrap well. .     Additional ulcer(s) noted? yes. Left heel ulcer, improving. Her current medication list consists of Acapella, Insulin Syringe-Needle U-100, Roflumilast, albuterol sulfate HFA, atorvastatin, azithromycin, blood glucose test strips, calcium carbonate, cetirizine, clopidogrel, docusate sodium, fluticasone, gabapentin, insulin glargine, insulin lispro, ipratropium, latanoprost, metoprolol succinate, midodrine, morphine, naloxone, omeprazole, ondansetron, oxyCODONE-acetaminophen, predniSONE, spironolactone, torsemide, and vitamin D. Allergies: Atenolol    Objective:     Vitals:    04/15/22 0915   BP: 129/75   Pulse: 89   Resp: 20   Temp: 97.3 °F (36.3 °C)   TempSrc: Oral   Weight: 153 lb (69.4 kg)   Height: 5' 8\" (1.727 m)     AAOx3, fatigued, NAD  No cellulitis, angitis, fluctuance  No acute arthritis or bursitis  Mild to moderate LE edema again  No contact dermatitis or cutaneous Candidiasis  Blanca-ulcer skin: indurated, pink, warm and dry, still a bit of callus and hyperkeratosis at heel. Ulcer(s):  sacrum is slowly a bit smaller, more pink and red, almost all granulation now, peripherally hypergranular again, with minimal fibrin and biofilm, less depth, no tunnels, mild undermining; heel ulcer a bit smaller, a bit more pink to red, granular, but a bit of fibrin and biofilm this week, edges starting to move in more readily. Photos also saved in electronic chart.     Today's wound measurements, per RN documentation:  Wound 20 #2, Sacrum, Pressure Injury, Stage 4, Onset 2020-Wound Length (cm): 1.7 cm  Wound 21 #5, decreased mobility, slowly healing stage 4 sacral pressure ulcer, no signs of infection or ongoing pressure; also a more recent onset stage 3 left heel pressure ulcer, improving more with recent compression in addition to previous care. Factors contributing to occurrence and/or persistence of the chronic ulcer include edema, venous stasis, chronic pressure, decreased mobility, shear force and immunosuppression. Medical necessity of today's visit is shown by the above documentation. Sharp debridement is indicated today, based upon the exam findings in the wound(s) above. Procedure note:     Consent obtained. Time out performed per [facility][1] policy. Anesthetic  Anesthetic: 4% Lidocaine Cream     Using a curette, I sharply debrided the foot (left , heel) ulcer(s) down through and including the removal of dermis. The type(s) of tissue debrided included fibrin and biofilm. Total Surface Area Debrided: 1 sq cm. The ulcers were then irrigated with normal saline solution. The procedure was completed with a small amount of bleeding, and hemostasis was with pressure. The patient tolerated the procedure well, with no significant complications. The patient's level of pain during and after the procedure was monitored. Post-debridement measurements, if different from pre-debridement, are in the flowsheet as well.  ____________    To encourage better epithelial cell coverage, I did use AgNO3 to chemically cauterize hypergranulation tissue on the sacrum ulcer(s), after application of 4% lidocaine topical solution. This was tolerated well, with no pain or skin injury.      Discharge plan:     Treatment in the wound care center today, per RN documentation: Wound 12/18/20 #2, Sacrum, Pressure Injury, Stage 4, Onset 5/2020-Dressing/Treatment: Other (comment) (ZincOxide to maegan,Collagen with Silver,Gauze,Sacral Mepilex)  Wound 12/20/21 #5, Left Heel, Pressure, Stage 3 (onset 12/10/2021)-Dressing/Treatment: Other (comment) (aquaphor,vashe,calmoseptine,collagen ag,double plain foam). Per physician order, left application of multilayer compression wrap was performed in the wound care center today, to help manage edema, stasis dermatitis, and/or venous ulcers. Leave primary dressing and multi-layer wrap(s) in place until the next appointment. Also discussed ways to keep the wrap dry for a shower, including a plastic cast-guard, available in retail pharmacies. She should call before her next visit if there is any significant pain, significant strike-through of drainage to the surface of the wrap, or any significant sense of the wrap sliding down more than an inch or so, bunching-up and abrading her skin. I reminded the patient of the importance of weight management and smoking cessation, if applicable; also encouraged ambulation as tolerated, additional lower extremity exercises as instructed in our education sheet, leg elevation when at rest, and compliance with any recommended dietary, diuretic and compression therapies. Keep up the good work with protein intake, offloading, glucose control. Home treatment: good handwashing before and after any dressing changes. Cleanse wound with saline or soap & water before dressing change. May use Vaseline (petrolatum), Aquaphor, Aveeno, CeraVe, Cetaphil, Eucerin, Lubriderm, etc for dry skin. Dressing type for home: for the sacrum, Vashe, ZnO, Multidex, collagen, Mepilex, three times weekly. Written discharge instructions given to patient. Follow up in 1 week.     Electronically signed by Bonnie Hull MD on 4/16/2022 at 2:04 PM.

## 2022-04-16 NOTE — PROGRESS NOTES
88 [facility] Progress Note    Georgeann Nageotte     : 1950    DATE OF VISIT:  2022    Subjective:     Georgeann Nageotte is a 80 y.o. female who has a pressure ulcer located on the sacrum. Significant symptoms or pertinent wound history since last visit: feeling well overall, eating well, offloading well, no fever, not too much in the way of joint pain. Leg swelling better. Additional ulcer(s) noted? yes. The left heel pressure ulcer. Her current medication list consists of Acapella, Insulin Syringe-Needle U-100, Roflumilast, albuterol sulfate HFA, atorvastatin, azithromycin, blood glucose test strips, calcium carbonate, cetirizine, clopidogrel, docusate sodium, fluticasone, gabapentin, insulin glargine, insulin lispro, ipratropium, latanoprost, metoprolol succinate, midodrine, morphine, naloxone, omeprazole, ondansetron, oxyCODONE-acetaminophen, predniSONE, spironolactone, torsemide, and vitamin D. Allergies: Atenolol    Objective:     Vitals:    22 0925 22 0933   BP:  (!) 159/81   Pulse:  83   Resp:  20   Temp: 97.3 °F (36.3 °C)    TempSrc:  Oral   Weight:  151 lb (68.5 kg)   Height:  5' 8\" (1.727 m)     AAOx3, fatigued, NAD  No cellulitis, angitis, fluctuance  No acute arthritis or bursitis  Mildmoderate LE edema again  No contact dermatitis or cutaneous Candidiasis  Blanca-ulcer skin: indurated, pink, warm and dry, still a bit of callus and hyperkeratosis at heel. Ulcer(s):  sacrum is slowly a bit smaller, more pink and red, less fibrotic, some improving granulation, with some fibrin and biofilm, less depth, one or two edges with better epidermal tissue, no tunnels, mild undermining; heel ulcer a bit smaller, a bit more pink to red, less fibrosis, steep edges, less edge maceration, just serous exudate. Photos also saved in electronic chart.     Today's wound measurements, per RN documentation:  Wound 21 #5, Left Heel, Pressure, Stage 3 (onset 12/10/2021)-Wound Length (cm): 0.6 cm  Wound 12/18/20 #2, Sacrum, Pressure Injury, Stage 4, Onset 5/2020-Wound Length (cm): 1.7 cm    Wound 12/20/21 #5, Left Heel, Pressure, Stage 3 (onset 12/10/2021)-Wound Width (cm): 1.3 cm  Wound 12/18/20 #2, Sacrum, Pressure Injury, Stage 4, Onset 5/2020-Wound Width (cm): 2 cm    Wound 12/20/21 #5, Left Heel, Pressure, Stage 3 (onset 12/10/2021)-Wound Depth (cm): 0.3 cm  Wound 12/18/20 #2, Sacrum, Pressure Injury, Stage 4, Onset 5/2020-Wound Depth (cm): 0.3 cm    Assessment:     Patient Active Problem List   Diagnosis Code    Rheumatoid arthritis ([facility] [address] M06.9    Psoriasis L40.9    GERD (gastroesophageal reflux disease) K21.9    Anemia D64.9    Cylindrical bronchiectasis ([facility]) J47.9    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    Immunocompromised state ([facility] [address] D84.9    Coronary artery disease I25.10    Bilateral lower extremity edema R60.0    Essential hypertension I10    Lumbar spondylosis M47.816    Mitral valve insufficiency and aortic valve insufficiency I08.0    Mixed hyperlipidemia E78.2    Myopia of both eyes H52.13    Osteoporosis M81.0    Other chronic sinusitis J32.8    Primary open angle glaucoma (POAG) of both eyes, mild stage H40.1131    Primary osteoarthritis of right hip M16.11    Type 2 diabetes mellitus with unspecified diabetic retinopathy without macular edema ([facility]) E11.319    Type 2 diabetes mellitus with diabetic peripheral angiopathy without gangrene, with long-term current use of insulin ([facility]) E11.51, Z79.4    Pressure ulcer of coccygeal region, stage 4 ([facility]) L89.154    Mild malnutrition ([facility]) E44.1    Chronic diastolic congestive heart failure ([facility]) I50.32    Chronic obstructive pulmonary disease ([facility]) J44.9    Hypergranulation L92.9    Nonrheumatic mitral (valve) insufficiency I34.0    Chronic respiratory failure with hypoxia ([facility]) J96.11    Pressure ulcer of left heel, stage 3 ([facility]) K69.977    Venous insufficiency of left lower extremity I87.2       Assessment of today's active condition(s): RA, decreased mobility, slowly healing stage 4 sacral pressure ulcer, no Hx of deep infection; also a more recent stage 3 left heel pressure ulcer, no infection or ischemia, but some complicating edema - I think starting to improve more now. Factors contributing to occurrence and/or persistence of the chronic ulcer include edema, venous stasis, diabetes, chronic pressure, decreased mobility, shear force and immunosuppression. Medical necessity of today's visit is shown by the above documentation. Sharp debridement is indicated today, based upon the exam findings in the wound(s) above. Procedure note:     Consent obtained. Time out performed per [facility]. Anesthetic  Anesthetic: 4% Lidocaine Cream     Using a curette, I sharply debrided the sacrum ulcer(s) down through and including the removal of dermis. The type(s) of tissue debrided included fibrin and biofilm. Total Surface Area Debrided: 3 sq cm. The ulcers were then irrigated with normal saline solution. The procedure was completed with a small amount of bleeding, and hemostasis was with pressure. The patient tolerated the procedure well, with no significant complications. The patient's level of pain during and after the procedure was monitored. Post-debridement measurements, if different from pre-debridement, are in the flowsheet as well. Discharge plan:     Treatment in the wound care center today, per RN documentation: Wound 12/20/21 #5, Left Heel, Pressure, Stage 3 (onset 12/10/2021)-Dressing/Treatment:  (Jenifer(maegan),Collagen,gauze, [id number] 2 LYTE, spandigrip)  Wound 12/18/20 #2, Sacrum, Pressure Injury, Stage 4, Onset 5/2020-Dressing/Treatment:  (ZnO2(maegan), Collagen, gauze,sacral border). Per physician order, left application of multilayer compression wrap was performed in the wound care center today, to help manage edema, stasis dermatitis, and/or venous ulcers.  Leave primary dressing and multi-layer wrap(s) in place until the next appointment. Also discussed ways to keep the wrap dry for a shower, including a plastic cast-guard, available in retail pharmacies. She should call before her next visit if there is any significant pain, significant strike-through of drainage to the surface of the wrap, or any significant sense of the wrap sliding down more than an inch or so, bunching-up and abrading her skin. I reminded the patient of the importance of weight management and smoking cessation, if applicable; also encouraged ambulation as tolerated, additional lower extremity exercises as instructed in our education sheet, leg elevation when at rest, and compliance with any recommended dietary, diuretic and compression therapies. Keep up with protein intake, glucose control, offloading. Home treatment: good handwashing before and after any dressing changes. Cleanse wound with saline or soap & water before dressing change. May use Vaseline (petrolatum), Aquaphor, Aveeno, CeraVe, Cetaphil, Eucerin, Lubriderm, etc for dry skin. Dressing type for home: for the sacrum, Vashe, periwound ZnO, Multidex, collagen, bordered foam, three times weekly. Written discharge instructions given to patient. Follow up in 1 week.     Electronically signed by Christian Cotton MD on 4/16/2022 at 2:00 PM.

## 2022-04-16 NOTE — PROGRESS NOTES
[facility] Progress Note    Abril Curtis     : 1950    DATE OF VISIT:  2022    Subjective:     Abril Curtis is a 80 y.o. female who has a pressure ulcer located on the sacrum. Significant symptoms or pertinent wound history since last visit: feeling ok overall, mild pain, modest drainage, no fever; increasing LE edema again. Additional ulcer(s) noted? yes. The heel pressure ulcer, slowly making progress. Her current medication list consists of Acapella, Insulin Syringe-Needle U-100, Roflumilast, albuterol sulfate HFA, atorvastatin, azithromycin, blood glucose test strips, calcium carbonate, cetirizine, clopidogrel, docusate sodium, fluticasone, gabapentin, insulin glargine, insulin lispro, ipratropium, latanoprost, metoprolol succinate, midodrine, morphine, naloxone, omeprazole, ondansetron, oxyCODONE-acetaminophen, predniSONE, spironolactone, torsemide, and vitamin D. Allergies: Atenolol    Objective:     Vitals:    22 0919   BP: (!) 147/69   Pulse: 81   Resp: 20   Temp: 96.7 °F (35.9 °C)   TempSrc: Oral   Weight: 153 lb (69.4 kg)   Height: 5' 8\" (1.727 m)     AAOx3, fatigued, NAD  No cellulitis, angitis, fluctuance  No acute arthritis or bursitis  More moderate LE edema again  No contact dermatitis or cutaneous Candidiasis  Blanca-ulcer skin: indurated, pink, warm and dry, still a bit of callus and hyperkeratosis at heel. Ulcer(s):  sacrum is a bit smaller, more pink and red, less fibrotic, some new granulation, with some fibrin and biofilm, less depth, one or two edges with better epidermal tissue; heel ulcer a bit smaller, a bit more pink to red, still some fibrosis, steep edges, minor edge maceration. Photos also saved in electronic chart.     Today's wound measurements, per RN documentation:  Wound 21 #5, Left Heel, Pressure, Stage 3 (onset 12/10/2021)-Wound Length (cm): 0.6 cm  Wound 20 #2, Sacrum, Pressure Injury, Stage 4, Onset Post Anesthesia Evaluation


Procedure:  Left quadriceps repair


Date of Evaluation:  Nov 22, 2019


Airway:  patent


Nausea:  No


Vomiting:  No


Hydration Status:  adequate


Cardiopulmonary Status:


at baseline


Mental Status/LOC:  patient returned to baseline


Post-Anesthesia Complications:


0


Follow-up care needed:  ready to discharge











Cleo Easton MD Nov 22, 2019 16:15 5/2020-Wound Length (cm): 1.6 cm    Wound 12/20/21 #5, Left Heel, Pressure, Stage 3 (onset 12/10/2021)-Wound Width (cm): 1.3 cm  Wound 12/18/20 #2, Sacrum, Pressure Injury, Stage 4, Onset 5/2020-Wound Width (cm): 2 cm    Wound 12/20/21 #5, Left Heel, Pressure, Stage 3 (onset 12/10/2021)-Wound Depth (cm): 0.3 cm  Wound 12/18/20 #2, Sacrum, Pressure Injury, Stage 4, Onset 5/2020-Wound Depth (cm): 0.8 cm    Assessment:     Patient Active Problem List   Diagnosis Code    Rheumatoid arthritis ([facility] [address] M06.9    Psoriasis L40.9    GERD (gastroesophageal reflux disease) K21.9    Anemia D64.9    Cylindrical bronchiectasis ([facility]) J47.9    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    Immunocompromised state ([facility] [address] D84.9    Coronary artery disease I25.10    Bilateral lower extremity edema R60.0    Essential hypertension I10    Lumbar spondylosis M47.816    Mitral valve insufficiency and aortic valve insufficiency I08.0    Mixed hyperlipidemia E78.2    Myopia of both eyes H52.13    Osteoporosis M81.0    Other chronic sinusitis J32.8    Primary open angle glaucoma (POAG) of both eyes, mild stage H40.1131    Primary osteoarthritis of right hip M16.11    Type 2 diabetes mellitus with unspecified diabetic retinopathy without macular edema ([facility]) E11.319    Type 2 diabetes mellitus with diabetic peripheral angiopathy without gangrene, with long-term current use of insulin ([facility]) E11.51, Z79.4    Pressure ulcer of coccygeal region, stage 4 ([facility]) L89.154    Mild malnutrition ([facility]) E44.1    Chronic diastolic congestive heart failure ([facility]) I50.32    Chronic obstructive pulmonary disease ([facility]) J44.9    Hypergranulation L92.9    Nonrheumatic mitral (valve) insufficiency I34.0    Chronic respiratory failure with hypoxia ([facility]) J96.11    Pressure ulcer of left heel, stage 3 ([facility]) W01.048    Venous insufficiency of left lower extremity I87.2       Assessment of today's active condition(s): RA, decreased mobility, slowly healing sacral pressure ulcer; also a more recent heel pressure ulcer, also healing slowly, better offloaded now, but I think slowed down from edema and drainage. Factors contributing to occurrence and/or persistence of the chronic ulcer include edema, venous stasis, diabetes, chronic pressure, decreased mobility, shear force and immunosuppression. Medical necessity of today's visit is shown by the above documentation. Sharp debridement is indicated today, based upon the exam findings in the wound(s) above. Procedure note:     Consent obtained. Time out performed per [facility]. Anesthetic  Anesthetic: 4% Lidocaine Cream     Using a curette, I sharply debrided the sacrum ulcer(s) down through and including the removal of dermis. The type(s) of tissue debrided included fibrin and biofilm. Total Surface Area Debrided: 3 sq cm. The ulcers were then irrigated with normal saline solution. The procedure was completed with a small amount of bleeding, and hemostasis was with pressure. The patient tolerated the procedure well, with no significant complications. The patient's level of pain during and after the procedure was monitored. Post-debridement measurements, if different from pre-debridement, are in the flowsheet as well. Discharge plan:     Treatment in the wound care center today, per RN documentation: Wound 12/20/21 #5, Left Heel, Pressure, Stage 3 (onset 12/10/2021)-Dressing/Treatment: Other (comment) (Calmoseptine,Collagen,2Plain Foam,Evudzn1Aimp)  Wound 12/18/20 #2, Sacrum, Pressure Injury, Stage 4, Onset 5/2020-Dressing/Treatment: Other (comment) (ZincOxide to maegan,Collagen Ag,gauze,Sacral Mepilex Border). Per physician order, left application of multilayer compression wrap was performed in the wound care center today, to help manage edema, stasis dermatitis, and/or venous ulcers. Leave primary dressing and multi-layer wrap(s) in place until the next appointment.  Also discussed ways to keep the wrap dry for a shower, including a plastic cast-guard, available in retail pharmacies. She should call before her next visit if there is any significant pain, significant strike-through of drainage to the surface of the wrap, or any significant sense of the wrap sliding down more than an inch or so, bunching-up and abrading her skin. I reminded the patient of the importance of weight management and smoking cessation, if applicable; also encouraged ambulation as tolerated, additional lower extremity exercises as instructed in our education sheet, leg elevation when at rest, and compliance with any recommended dietary, diuretic and compression therapies. Continue efforts at protein intake and offloading as well ([employer] boot, group 2 mattress, ROHO). Home treatment: good handwashing before and after any dressing changes. Cleanse wound with saline or soap & water before dressing change. May use Vaseline (petrolatum), Aquaphor, Aveeno, CeraVe, Cetaphil, Eucerin, Lubriderm, etc for dry skin. Dressing type for home: for the sacrum, Vashe, ZnO, Multidex, collagen, dry dressing, three times weekly. Written discharge instructions given to patient. Follow up in 1 week.     Electronically signed by Kassi [facility] MD on 4/16/2022 at 1:52 PM.

## 2022-04-18 NOTE — PROGRESS NOTES
[facility]   Cardiovascular Evaluation    PATIENT: Susanna Ardon  DATE: 2022  MRN: [id number]  CSN: [id number]  : 1950      Primary Care Doctor: Sana Alvarez MD  Reason for evaluation:   Follow-up (discuss echo), Congestive Heart Failure, Hypertension, Coronary Artery Disease, Hyperlipidemia, Edema (weight continues to go up), Shortness of Breath, and Fatigue (some days more tired)  follow up for CAD. Subjective:   History of present illness on initial date of evaluation:   Susanna Ardon is a 80 y.o. patient with a history of CAD with NSTEMI, anemia, HLD and DM who presents for follow up. She had right fem-pop bypass 2017. Her cardiac cath from 21 showed severe MV CAD. She underwent CABG x3 with BIMAL obliteration with VICKY Sawyer on 21. On 05/10/21 she underwent mediastinal exploration and evacuation of Hematoma with DR Rocha. [address] [place] [address] 2021 showed occluded SVG-OM, suspect severe MR due to papillary dysfunction r/t occluded RCA/LCx. Last OV 10/21/2021, stated SOB started prior to her last cath and has decreased some since her procedure. She has occasional sharp quick chest pain at night. She is currently wearing a wound Vac for a wound on her bottom and additional ulcer at her left leg vein harvest site- is following with wound care- Dr. Ginny Álvarez. She had COVID in 2021. Today she presents with a rolling walker. She has increased swelling in her legs. She is more SOB with activity. She sleeps fine, denies gasping for air at night. She props her head up to sleep at night. This is not new for her. She is taking three tablets of torsemide daily (2 in AM and 2 in PM) for the past two weeks to help decrease her weight. She was dizzy and light headed this past weekend.            Patient Active Problem List   Diagnosis    Rheumatoid arthritis ([address]    Psoriasis    GERD (gastroesophageal reflux disease)    Anemia    Cylindrical bronchiectasis ([address]    Tracheobronchomalacia    Immunocompromised state ([address]    Coronary artery disease    Bilateral lower extremity edema    Essential hypertension    Lumbar spondylosis    Mitral valve insufficiency and aortic valve insufficiency    Mixed hyperlipidemia    Myopia of both eyes    Osteoporosis    Other chronic sinusitis    Primary open angle glaucoma (POAG) of both eyes, mild stage    Primary osteoarthritis of right hip    Type 2 diabetes mellitus with unspecified diabetic retinopathy without macular edema (HCC)    Type 2 diabetes mellitus with diabetic peripheral angiopathy without gangrene, with long-term current use of insulin ([facility])    Pressure ulcer of coccygeal region, stage 4 (HCC)    Mild malnutrition (HCC)    Chronic diastolic congestive heart failure (HCC)    Chronic obstructive pulmonary disease (HCC)    Hypergranulation    Nonrheumatic mitral (valve) insufficiency    Chronic respiratory failure with hypoxia ([facility])    Pressure ulcer of left heel, stage 3 (HCC)    Venous insufficiency of left lower extremity         Past Medical History:   has a past medical history of Acute on chronic diastolic heart failure due to coronary artery disease ([facility]), Acute respiratory failure with hypoxia ([facility]), Atherosclerosis of native artery of right lower extremity with rest pain ([address] Back pain, Branch retinal vein occlusion, Bronchiectasis with acute exacerbation ([facility]), Cellulitis and abscess of left leg, Cellulitis of left lower extremity, CHF (congestive heart failure) ([facility]), Closed compression fracture of thoracic vertebra ([facility]), Closed fracture of facial bone with routine healing, Closed jaw fracture ([address] Community acquired pneumonia of left lower lobe of lung, Compression fracture of L1 lumbar vertebra ([facility]), COPD (chronic obstructive pulmonary disease) ([address] COVID, Fracture of tibial plateau, closed, left, initial encounter, HCAP (healthcare-associated pneumonia), Minimally displaced zone I fracture of sacrum (HCC), MRSA (methicillin resistant staph aureus) culture positive, MRSA (methicillin resistant Staphylococcus aureus), Mucus plugging of bronchi, NSTEMI (non-ST elevated myocardial infarction) ([address] Osteomyelitis of mandible, Osteoporosis with pathological fracture, Post herpetic neuralgia, Proximal humerus fracture, Rheumatoid arthritis ([address] Shingles, Sleep apnea, Status post incision and drainage, Surgical wound dehiscence, initial encounter (at [facility] SVG harvest site), Temporal arteritis ([address] Tobacco use, Tracheomalacia, and Vitreous hemorrhage, right eye ([address] Surgical History:   has a past surgical history that includes hernia repair; Mandible fracture surgery; Foot surgery; Elbow surgery; Cataract removal; Tubal ligation; Knee arthroscopy; Kyphosis surgery; laminectomy; Spinal fusion; Septoplasty (05/07/2013); Artery surgery (05/30/2013); Upper gastrointestinal endoscopy (04/08/2014); bronchoscopy; bronchoscopy (N/A, 06/12/2019); Mandible fracture surgery (02/2020); back surgery (08/2020); other surgical history (01/08/2021); Pressure ulcer debridement (N/A, 01/08/2021); Artery Biopsy (Right, 03/01/2021); Coronary artery bypass graft (N/A, 05/03/2021); Mediastinoscopy (N/A, 05/05/2021); Cardiac surgery (05/03/2021); Leg Surgery (Left, 7/13/2021); IR MIDLINE CATH (7/14/2021); transesophageal echocardiogram (11/02/2021); and Colonoscopy. Social History:   reports that she quit smoking about 31 years ago. Her smoking use included cigarettes. She has a 20.00 pack-year smoking history. She has never used smokeless tobacco. She reports previous alcohol use. She reports that she does not use drugs. Family History:  No evidence for sudden cardiac death or premature CAD    Home Medications:  Reviewed and are listed in nursing record.  and/or listed below  Current Outpatient Medications   Medication Sig Dispense Refill    midodrine (PROAMATINE) 5 MG tablet Take 1 tablet by mouth 2 times daily (Patient taking differently: Take 5 mg by mouth daily ) 90 tablet 0    torsemide (DEMADEX) 20 MG tablet Take 2 tablets by mouth daily If weight 131 lbs or less, decrease to 20 mg daily 180 tablet 3    metoprolol succinate (TOPROL XL) 25 MG extended release tablet Take 0.5 tablets by mouth daily 45 tablet 3    spironolactone (ALDACTONE) 25 MG tablet Take 1 tablet by mouth daily 90 tablet 3    clopidogrel (PLAVIX) 75 MG tablet Take 1 tablet by mouth daily 90 tablet 3    azithromycin (ZITHROMAX) 250 MG tablet Take 1 tablet by mouth daily 90 tablet 3    predniSONE (DELTASONE) 1 MG tablet Take 4 mg by mouth daily      ondansetron (ZOFRAN) 4 MG tablet every 8 hours as needed       oxyCODONE-acetaminophen (PERCOCET)  MG per tablet Take 1 tablet by mouth daily.  insulin glargine (LANTUS) 100 UNIT/ML injection vial Inject 30 Units into the skin nightly 1 pen 0    DALIRESP 500 MCG tablet TAKE 1 TABLET BY MOUTH DAILY 30 tablet 11    docusate sodium (COLACE) 100 MG capsule Take 100 mg by mouth 2 times daily as needed for Constipation      gabapentin (NEURONTIN) 300 MG capsule Take 300 mg by mouth in the morning and at bedtime.  latanoprost (XALATAN) 0.005 % ophthalmic solution Place 1 drop into both eyes nightly      NARCAN 4 MG/0.1ML LIQD nasal spray as needed       morphine (MS CONTIN) 15 MG extended release tablet 15 mg 2 times daily.        ipratropium (ATROVENT) 0.06 % nasal spray USE 2 SPRAYS BY NASAL ROUTE 2-4 TIMES DAILY (Patient taking differently: 3 times daily as needed ) 1 Bottle 5    albuterol sulfate  (90 Base) MCG/ACT inhaler INHALE 2 PUFFS INTO THE LUNGS EVERY 4 HOURS AS NEEDED FOR WHEEZING 1 Inhaler 5    vitamin D (CHOLECALCIFEROL) 1000 UNIT TABS tablet Take 5,000 Units by mouth daily       calcium carbonate (OSCAL) 500 MG TABS tablet Take 500 mg by mouth daily      atorvastatin (LIPITOR) 40 MG tablet Take 40 mg by mouth      Insulin Syringe-Needle U-100 31G X 5/16\" 0.5 ML MISC USE 5 TIMES DAILY      ACCU-CHEK CEDRICK PLUS strip TEST 4 TIMES DAILY  3    insulin lispro (HUMALOG) 100 UNIT/ML injection vial Inject 0-12 Units into the skin 3 times daily (with meals)      Misc. Devices (ACAPELLA) MISC Take 1 Device by mouth as needed 1 each 0    fluticasone (FLONASE) 50 MCG/ACT nasal spray INHALE 2 SPRAYS IN EACH NOSTRIL DAILY 1 Bottle 5    cetirizine (ZYRTEC) 10 MG tablet Take 10 mg by mouth daily.  omeprazole (PRILOSEC) 40 MG capsule Take 40 mg by mouth daily        No current facility-administered medications for this visit. Allergies:  Atenolol     Review of Systems:   A 14 point review of symptoms completed. Pertinent positives identified in the HPI, all other review of symptoms negative as below.     Objective:   PHYSICAL EXAM:    Vitals:    04/19/22 0951   BP: 136/66   Pulse: 77   SpO2: 98%    Weight: 156 lb (70.8 kg)     Wt Readings from Last 3 Encounters:   04/19/22 156 lb (70.8 kg)   04/15/22 153 lb (69.4 kg)   04/08/22 151 lb (68.5 kg)         General Appearance:  Alert, cooperative, no distress, appears stated age   Head:  Normocephalic, atraumatic   Eyes:  PERRL, conjunctiva/corneas clear   Nose: Nares normal, no drainage or sinus tenderness   Throat: Lips, mucosa, and tongue normal   Neck: Supple, symmetrical, trachea midline, NL thyroid no carotid bruit or JVD   Lungs:   CTAB, respirations unlabored, well-healed surgical scar   Chest Wall:  No tenderness or deformity   Heart:  Regular rhythm and normal rate; S1, S2 are normal;   no murmur noted; no rub or gallop   Abdomen:   Soft, non-tender, +BS x 4, no masses, no organomegaly   Extremities: Extremities normal, atraumatic, no cyanosis 1+ BLE pitting  edema   Pulses: 2+ and symmetric   Skin: Skin color, texture, turgor normal, no rashes or lesions   Pysch: Normal mood and affect   Neurologic: Normal gross motor and sensory exam.         LABS   CBC:      Lab Results   Component Value Date WBC 7.9 03/17/2022    RBC 3.74 03/17/2022    HGB 10.6 03/17/2022    HCT 32.8 03/17/2022    MCV 87.7 03/17/2022    RDW 14.8 03/17/2022     03/17/2022     CMP:  Lab Results   Component Value Date     03/17/2022    K 3.8 03/17/2022    K 4.1 12/21/2021    CL 99 03/17/2022    CO2 29 03/17/2022    BUN 32 03/17/2022    CREATININE 1.1 03/17/2022    GFRAA 59 03/17/2022    GFRAA >60 05/07/2013    AGRATIO 0.9 01/05/2022    LABGLOM 49 03/17/2022    GLUCOSE 46 03/17/2022    PROT 6.5 01/05/2022    PROT 7.1 03/21/2013    CALCIUM 9.5 03/17/2022    BILITOT 0.6 01/05/2022    ALKPHOS 140 01/05/2022    AST 22 01/05/2022    ALT 26 01/05/2022     PT/INR:   No results found for: PTINR  Liver:  No components found for: CHLPL  Lab Results   Component Value Date    ALT 26 01/05/2022    AST 22 01/05/2022    ALKPHOS 140 (H) 01/05/2022    BILITOT 0.6 01/05/2022     Lab Results   Component Value Date    LABA1C 7.7 12/20/2021     Lipids:         Lab Results   Component Value Date    TRIG 85 10/29/2021    TRIG 97 08/11/2021    TRIG 64 05/03/2021            Lab Results   Component Value Date    HDL 34 (L) 10/29/2021    HDL 34 (L) 08/11/2021    HDL 38 (L) 05/03/2021            Lab Results   Component Value Date    LDLCALC 61 10/29/2021    LDLCALC 54 08/11/2021    LDLCALC 35 05/03/2021            Lab Results   Component Value Date    LABVLDL 17 10/29/2021    LABVLDL 19 08/11/2021    LABVLDL 13 05/03/2021         CARDIAC DATA   EKG 12/20/2021  Sinus rhythm with occasional Premature ventricular complexes  Possible Left atrial enlargement  Non-specific intra-ventricular conduction delay  Nonspecific ST and T wave abnormality  Abnormal ECG    ECHO 3/24/2022  Left ventricular systolic function is moderately reduced with ejection   fraction estimated at 30-35 %. Moderate global hypokinesis is present. Left ventricular size is severely increased. Heart is spherical.Wall   thickness is normal.   The left atrium is mildly dilated.    The ascending aorta is mildly dilated at 3.6cm. Grade II diastolic dysfunction with elevated filling pressure. Moderate mitral regurgitation. Mild aortic regurgitation is present. Moderate tricuspid regurgitation. ECHO 3/24/21  Left ventricular systolic function is moderately reduced with ejection   fraction estimated at 30-35 %. Moderate global hypokinesis is present. Left ventricular size is severely increased. Heart is spherical.Wall   thickness is normal.   The left atrium is mildly dilated. The ascending aorta is mildly dilated at 3.6cm. Grade II diastolic dysfunction with elevated filling pressure. Moderate mitral regurgitation. Mild aortic regurgitation is present. Moderate tricuspid regurgitation. ECHO Transesophageal 10/29/2021   Summary   -- Ejection fraction is visually estimated to be 50-55 %. -- Mild mitral regurgitation. -- The left atrium is mildly dilated. There is no evidence of mass or   thrombus in the left atrium or appendage. -- Bubble study was performed and showed grade I pulmonary arteriovenous   malformation ( < 30 bubbles in left ventricle after 3rd cardiac cycle). Moderate layered plaque is noted in the ascending aorta and arch.     STRESS TEST:    Cath:[facility] 10/7/2016  This is a right dominant coronary arterial system    Left Main coronary artery: distal 30-40% lesion  Left anterior descending coronary artery: ostial 30-40% lesion  Left circumflex coronary artery: Heavily calcified proximal   vessel with ostial 50-60% lesion, mid 50-60% lesion, OM2: normal   caliber vessel, heavily calcified with proximal 50-60% lesion,   OM3: 30-40% proximal lesion  Right coronary artery: heavily calcified vessel, minimal luminal   irregularities, RPDA: small < 2mm caliber vessel with 100%   proximal occlusion and grade II R to R collaterals from an RV   marginal.  Left ventriculogram: Normal wall motion, LVEF = 55%  LVEDP: 4 mmHg  Aortic pressure: 90/50 mmHg    Impression:   2 Vessel CAD  Normal LV function  Normal LVEDP  Normal systemic pressures     CARDIAC CATH: 21  Procedure Findings:  1. Severe multi vessel coronary artery diease              ~not amenable to PCI  2. Normal left ventricular function with EF estimated at 55-60%  3. Normal left heart hemodynamics    CAB21 DR Virginia Dubin  OPERATION PERFORMED:  1. Urgent coronary bypass grafting surgery x3 with single greater  saphenous vein graft to the posterior ventricular branch of the right  coronary artery, separate single greater saphenous vein graft to the  second obtuse marginal branch of circumflex,  pedicled left internal artery to the LAD. 2.  Left atrial appendage obliteration with 40 mm AtriCure left atrial  clip. 3.  Cardiopulmonary bypass. 4.  Endoscopic vein harvest of the left greater saphenous vein. 5.  Transesophageal echo. 6.  Epiaortic ultrasound. 7.  Doppler verification of grafts. 8.  Bilateral five-level intercostal nerve block with Exparel. 9.  Platelet gel application. 10.  Sternal plating. 05/10/21 DR Rocha  OPERATION PERFORMED:  Mediastinal exploration and evacuation of  Hematoma.       Left heart cath Dr. Gonzalez Cea 2021  Procedure Note     Procedure:          C with grafts  Indication:          UA, wma, depressed EF, severe MR     Procedure Details:  Consent Access Bleed R Sedat Start Stop Versed Fentanyl Contrast Fluoro EBL Comp Spec   Yes RCFA int MCSFC 1448 1541 1.5 75 195 12.5 <20 None None   *Sedation Note: MCSFC = minimal conscious sedation for comfort     Findings:  Artery Findings/Result   LM Normal   LAD Mid 100% after large diagonal, distal supplied by LIMA   Cx 99% ostial, distal supplied by R-L collaterals   RI N/A   RCA 70% distal, % prox   S-O occluded   S-PLB patent   L-LAD  patent   LVEDP 8   LVG NA due to contrast      Intervention:         None     Post Cath Dx:       Severe native disease as above  Occluded S-OM  Suspect severe MR related to papillary dysfunction related to occluded distal RCA and Cx  Consider MVR when wound issues resolve if MR does not improved with medical therapy  Would followup with Dr. Villanueva See        VASCULAR/OTHER IMAGING:      Assessment and Plan   Venecia Babcock is a 80 y.o. female who presents today for the following problems:      1. CAD:    - 5/10/2021 3V CABG   - 8/2021 closure of SVG-OM  2. Mitral regurg: mild (on ABHISHEK)  3.  Bilateral lower extremity edema   R>L due to vein harvesting  4. Mixed ischemic/nonischemic systolic cardiomyopathy:     LVEF 30-35%   - LVEF normal prior to CABG then fell, suspect some severe MR and loss of SVG-OM added to loss of LVEF and fluctuations over angelica time as well as possible COVID  5. History of Covid 2021      MD Plan:  1. She is feeling much better following Covid resolution and medical treatment of her cardiac condition some BLE leg edema and SOb only with activity  2. Last ABHISHEK was NOT 50% and report amended, re-review LVEF closer to 30%  3. Again I think her EF is bounced around somewhat due to severe MR and loss of SVG to OM that has now settled down. Last echo shows MR is mild but EF remains stable in the 30% range. - d/w pt ICD and referral to EP   - will start entresto and check limited echo in 2 mon, if LVEF <35%, will push for ICD  4. Bilateral lower extremity edema but does not appear to be an active heart failure. I am concerned about her 8 pound weight gain.   -Hold off on additional Lasix at this time  5. We will start Entresto 24/26 p.o. twice daily   -Renal in 1 week, BNP   -Of note she is off Midrin and following her COVID hospitalization and blood pressure is now rising to allow ARNI use  6. If this does not trigger diuresis will then increase Lasix to 2 tablets twice daily and or single dose Zaroxolyn  7. Continue Plavix monotherapy. No aspirin due to bruising.   8. Continue Lipitor, Plavix, Toprol, Aldactone, torsemide        MDM: mod    Patient signed scribe in my presence, and it is both accurate and complete to the best of our ability and knowledge. I agree with the details independently gathered by my clinical support staff, while the remaining scribed note accurately describes my personal service to the patient. The above RN is working as a scribe for and in the presence of myself . Working as a scribe, the RN may have prepopulated components of this note with my historical intellectual property under my direct supervision. Any additions to this intellectual property were performed at my direction. Furthermore, the content and accuracy of this note have been reviewed by me to the best of my ability.

## 2022-04-19 ENCOUNTER — OFFICE VISIT (OUTPATIENT)
Dept: CARDIOLOGY CLINIC | Age: 72
End: 2022-04-19
Payer: MEDICARE

## 2022-04-19 VITALS
OXYGEN SATURATION: 98 % | HEIGHT: 68 IN | SYSTOLIC BLOOD PRESSURE: 136 MMHG | DIASTOLIC BLOOD PRESSURE: 66 MMHG | HEART RATE: 77 BPM | BODY MASS INDEX: 23.64 KG/M2 | WEIGHT: 156 LBS

## 2022-04-19 DIAGNOSIS — R60.9 EDEMA, UNSPECIFIED TYPE: ICD-10-CM

## 2022-04-19 DIAGNOSIS — I34.0 NONRHEUMATIC MITRAL VALVE REGURGITATION: ICD-10-CM

## 2022-04-19 DIAGNOSIS — Z86.16 HISTORY OF COVID-19: ICD-10-CM

## 2022-04-19 DIAGNOSIS — I50.22 CHRONIC SYSTOLIC CONGESTIVE HEART FAILURE (HCC): Primary | ICD-10-CM

## 2022-04-19 DIAGNOSIS — I50.32 CHRONIC DIASTOLIC CONGESTIVE HEART FAILURE (HCC): ICD-10-CM

## 2022-04-19 DIAGNOSIS — R06.02 SOB (SHORTNESS OF BREATH): ICD-10-CM

## 2022-04-19 PROCEDURE — 99214 OFFICE O/P EST MOD 30 MIN: CPT | Performed by: INTERNAL MEDICINE

## 2022-04-19 NOTE — PATIENT INSTRUCTIONS
Patient Plan:  1. Stop midodrine  2. Start Entresto 24-26 mg twice daily   - Call me if you have complications with this medication   3. Labs 1 week after taking entresto: BNP, BMP   - I will call you with results  4. Recommend elevating your feel about you heart to decrease swelling  5. Recommend below the knee compression stockings during the day   6. Continue torsemide  7. Follow up in 3 weeks with NPLR  8.  Discussed referral to electrophysiology doctor to discuss ICD placement    - Will defer for now

## 2022-04-22 ENCOUNTER — HOSPITAL ENCOUNTER (OUTPATIENT)
Dept: WOUND CARE | Age: 72
Discharge: HOME | End: 2022-04-22
Payer: MEDICARE

## 2022-04-22 VITALS
TEMPERATURE: 98.3 F | SYSTOLIC BLOOD PRESSURE: 133 MMHG | HEART RATE: 95 BPM | RESPIRATION RATE: 20 BRPM | HEIGHT: 68 IN | WEIGHT: 156.38 LBS | DIASTOLIC BLOOD PRESSURE: 75 MMHG | BODY MASS INDEX: 23.7 KG/M2 | OXYGEN SATURATION: 99 %

## 2022-04-22 DIAGNOSIS — L89.154 PRESSURE ULCER OF COCCYGEAL REGION, STAGE 4 (HCC): ICD-10-CM

## 2022-04-22 DIAGNOSIS — L89.623 PRESSURE ULCER OF LEFT HEEL, STAGE 3 (HCC): ICD-10-CM

## 2022-04-22 DIAGNOSIS — R60.0 BILATERAL LOWER EXTREMITY EDEMA: ICD-10-CM

## 2022-04-22 DIAGNOSIS — L92.9 HYPERGRANULATION: ICD-10-CM

## 2022-04-22 DIAGNOSIS — I87.2 VENOUS INSUFFICIENCY OF LEFT LOWER EXTREMITY: Primary | ICD-10-CM

## 2022-04-22 PROCEDURE — 97597 DBRDMT OPN WND 1ST 20 CM/<: CPT | Performed by: INTERNAL MEDICINE

## 2022-04-22 PROCEDURE — 29581 APPL MULTLAYER CMPRN SYS LEG: CPT | Performed by: INTERNAL MEDICINE

## 2022-04-22 PROCEDURE — 29581 APPL MULTLAYER CMPRN SYS LEG: CPT

## 2022-04-22 PROCEDURE — 97597 DBRDMT OPN WND 1ST 20 CM/<: CPT

## 2022-04-22 RX ORDER — LIDOCAINE 40 MG/G
CREAM TOPICAL ONCE
Status: DISCONTINUED | OUTPATIENT
Start: 2022-04-22 | End: 2022-04-23 | Stop reason: HOSPADM

## 2022-04-22 RX ORDER — LIDOCAINE HYDROCHLORIDE 40 MG/ML
SOLUTION TOPICAL ONCE
Status: CANCELLED | OUTPATIENT
Start: 2022-04-22 | End: 2022-04-22

## 2022-04-22 RX ORDER — LIDOCAINE 40 MG/G
CREAM TOPICAL ONCE
Status: CANCELLED | OUTPATIENT
Start: 2022-04-22 | End: 2022-04-22

## 2022-04-22 RX ORDER — LIDOCAINE 50 MG/G
OINTMENT TOPICAL ONCE
Status: CANCELLED | OUTPATIENT
Start: 2022-04-22 | End: 2022-04-22

## 2022-04-22 RX ORDER — BACITRACIN ZINC AND POLYMYXIN B SULFATE 500; 1000 [USP'U]/G; [USP'U]/G
OINTMENT TOPICAL ONCE
Status: CANCELLED | OUTPATIENT
Start: 2022-04-22 | End: 2022-04-22

## 2022-04-22 ASSESSMENT — PAIN DESCRIPTION - PROGRESSION: CLINICAL_PROGRESSION: RAPIDLY WORSENING

## 2022-04-22 ASSESSMENT — PAIN DESCRIPTION - LOCATION: LOCATION: FOOT

## 2022-04-22 ASSESSMENT — PAIN DESCRIPTION - FREQUENCY: FREQUENCY: INTERMITTENT

## 2022-04-22 ASSESSMENT — PAIN DESCRIPTION - PAIN TYPE: TYPE: CHRONIC PAIN

## 2022-04-22 ASSESSMENT — PAIN SCALES - GENERAL: PAINLEVEL_OUTOF10: 6

## 2022-04-22 ASSESSMENT — PAIN DESCRIPTION - ORIENTATION: ORIENTATION: LEFT

## 2022-04-22 ASSESSMENT — PAIN DESCRIPTION - ONSET: ONSET: ON-GOING

## 2022-04-22 ASSESSMENT — PAIN DESCRIPTION - DESCRIPTORS: DESCRIPTORS: SHOOTING;STABBING;THROBBING;SHARP

## 2022-04-22 ASSESSMENT — PAIN - FUNCTIONAL ASSESSMENT: PAIN_FUNCTIONAL_ASSESSMENT: PREVENTS OR INTERFERES SOME ACTIVE ACTIVITIES AND ADLS

## 2022-04-22 NOTE — PROGRESS NOTES
[facility] Physician Orders and Discharge [facility]  [address], [address]  [place], [facility]  Telephone: [phone]      Fax: [phone] home care Madiha Escobedo  Nurse Karthik Ramsey [phone]      Your wound-care supplies will be provided by: [employer] orders supplies through [employer]. Please note, depending on your insurance coverage, you may have out-of-pocket expenses for these supplies. Someone from [employer] may call you to confirm your order and discuss those potential costs before they ship your products -- please anticipate that call. If your out-of-pocket cost is going to be less than $200, and [employer] cannot reach you, they may ship your supplies as soon as the order is processed, and will send you a bill. If your out-of-pocket cost is going to be more than $200, [employer] should not ship your supplies until they speak with you. If you have any questions about your supplies or your potential out-of-pocket costs, or if you need to place an order for a refill of supplies (typically monthly), Yasmine Bae is your local representative, and can be reached at [phone]. Information on [employer]'s return policy will also be enclosed with your supplies -- please read that carefully before opening your items.      NAME:  Gris Taveras   DATE of BIRTH:  1950  PRIMARY DIAGNOSIS FOR WOUND CARE CENTER:  Pressure ulcer     Wound cleansing:   Do not scrub or use excessive force. Wash hands with soap and water before and after dressing changes. Prior to applying a clean dressing, cleanse wound with normal saline, wound cleanser, or mild soap and water.  Ask your physician or nurse before getting the wound(s) wet in the shower.                Wound care for home:        Left Medial Heel, Deep tissue injury:  Aquaphor to dry skin of lower leg  Betadine to maegan wound let air dry then apply Calmoseptine  Collagen with silver  Double layer of plain foam  Compri 2 Lite  Keep dressing clean, dry and in place until next weeks visit           Sacral Wound:   Vashe or Hypochlorous acid soaked gauze applied to wound for 2-3 minutes, no need to rinse  Triad or Zinc Oxide to maegan-wound  Multidex Powder then Collagen with silver to wound bed & tuck into depth of wound  Fluff gauze over wound  Cover with small sacral mepilex border  HHC to change dressing on Monday, Wednesday, Friday next week        Please note, all wounds (unless stated otherwise here) were mechanically debrided at the time of cleansing here in the wound-care center today, so a small amount of pain, drainage or bleeding from that process might be expected, and is normal.      All products for home use, including multiple products for a single wound if applicable, are medically necessary in order to achieve the best chance at timely wound healing. See provider documentation for details if needed.     Substituted dressings applied in the [facility] today, if applicable:      [facility]     New orders for this week (labs, imaging, medications, etc.):             Additional instructions for specific diagnoses:     +ROHO cushion- use at [address] when sitting!!  +True Balance Air group II mattress      General comments for pressure ulcers:  *  Make sure you stay well-hydrated, and maintain good protein intake. *  Reposition at least every two hours, to keep from having pressure in one spot for too long. *  If you are not sure whether you have the best offloading surfaces (mattress, mattress overlay, chair cushion, heel-protector boots, etc), please ask. *  Moisturize your skin regularly with Vaseline, Aquaphor, Aveeno, CeraVe, Cetaphil, Eucerin, Lubriderm, etc; but keep the skin between your toes dry.   *  If you smoke, your wound can not heal properly -- please talk with us when you're ready to quit.         F/U Appointment is with Dr. Deep Morales in 1 week on [address]isa [address]     Your nurse  is Gokul Fisher RN      If we applied slip-resistant hospital socks today, be sure to remove them at least once a day to inspect your toes or feet, even if you're not changing the wraps or dressings underneath.  If you see anything concerning (redness, excess moisture, etc), please call and let us know right away.     Should you experience any significant changes in your wound(s) (including redness, increased warmth, increased pain, increased drainage, odor, or fever) or have questions about your wound care, please contact the [facility] at [phone] Monday-Thursday from 8:00 am - 4:30 pm, or Friday from 8:00 am - 2:30 pm.  If you need help with your wound outside these hours and cannot wait until we are again available, contact your home-care company (if applicable), your PCP, or go to the nearest emergency room

## 2022-04-22 NOTE — PLAN OF CARE
Discharge instructions reviewed with patient, all questions answered, copy given to patient. Dressings were applied to all wounds per M.D. Instructions at this visit.

## 2022-04-27 ENCOUNTER — HOSPITAL ENCOUNTER (OUTPATIENT)
Age: 72
Setting detail: SPECIMEN
Discharge: HOME | End: 2022-04-27
Payer: MEDICARE

## 2022-04-27 ENCOUNTER — TELEPHONE (OUTPATIENT)
Dept: PULMONOLOGY | Age: 72
End: 2022-04-27

## 2022-04-27 DIAGNOSIS — R60.9 EDEMA, UNSPECIFIED TYPE: ICD-10-CM

## 2022-04-27 DIAGNOSIS — I50.32 CHRONIC DIASTOLIC CONGESTIVE HEART FAILURE (HCC): ICD-10-CM

## 2022-04-27 LAB
ANION GAP SERPL CALCULATED.3IONS-SCNC: 10 MMOL/L (ref 3–16)
BUN BLDV-MCNC: 32 MG/DL (ref 7–20)
CALCIUM SERPL-MCNC: 8.8 MG/DL (ref 8.3–10.6)
CHLORIDE BLD-SCNC: 99 MMOL/L (ref 99–110)
CO2: 29 MMOL/L (ref 21–32)
CREAT SERPL-MCNC: 1 MG/DL (ref 0.6–1.2)
GFR AFRICAN AMERICAN: >60
GFR NON-AFRICAN AMERICAN: 55
GLUCOSE BLD-MCNC: 54 MG/DL (ref 70–99)
POTASSIUM SERPL-SCNC: 4 MMOL/L (ref 3.5–5.1)
PRO-BNP: 3878 PG/ML (ref 0–124)
SODIUM BLD-SCNC: 138 MMOL/L (ref 136–145)

## 2022-04-27 PROCEDURE — 36415 COLL VENOUS BLD VENIPUNCTURE: CPT

## 2022-04-27 PROCEDURE — 80048 BASIC METABOLIC PNL TOTAL CA: CPT

## 2022-04-27 PROCEDURE — 83880 ASSAY OF NATRIURETIC PEPTIDE: CPT

## 2022-04-27 RX ORDER — PREDNISONE 10 MG/1
TABLET ORAL
Qty: 30 TABLET | Refills: 0 | Status: SHIPPED | OUTPATIENT
Start: 2022-04-27 | End: 2022-05-06

## 2022-04-27 RX ORDER — DOXYCYCLINE HYCLATE 100 MG
100 TABLET ORAL 2 TIMES DAILY
Qty: 14 TABLET | Refills: 0 | Status: SHIPPED | OUTPATIENT
Start: 2022-04-27 | End: 2022-05-04

## 2022-04-27 NOTE — TELEPHONE ENCOUNTER
Would prefer to see virtually but I don't have any availabilities. Sending abx & steroid taper.  Hope you feel better

## 2022-04-27 NOTE — TELEPHONE ENCOUNTER
Would prefer to see virtually but do not have availabilities. Sending abx & steroid taper hope you feel better.  ER with worsening

## 2022-04-27 NOTE — TELEPHONE ENCOUNTER
Do you have the following symptoms? Shortness of Breath  yes  Wheezing  yes  Cough  yes  Cough Characteristics:  Productive    no  Sputum Color    no  Hemoptysis   no  Consistency of sputum   no     Fever:    no    Temp:no  Chills/Sweats:  no  What other symptoms are you having?:  Home care nurse states wheezing and rhonchi throughout right side    How long have you had these symptoms? 3 days     Pharm[facility]: [facility]    Have you been vaccinated for covid? Yes  Have you received a booster vaccine? No          Review medications and allergies: Allergies? Allergies   Allergen Reactions    Atenolol Other (See Comments)     Cough               Currently on Antibiotics? (Drug/Dose/Frequency and how long on?) azithromycin 250mg daily        Systemic Steroids? (Drug/Dose/Frequency and how long on?) prednisone 4mg daily       Last OV 1/13/22 with Dr. César Cabral   (pull in last visit note assessment/plan)   ASSESSMENT:   · COPD/Chronic bronchitis/cough with cylindrical bronchiectasis  · Centrilobular pulmonary emphysema  · H/O COVID 95/24/31 complicated by MRSA pneumonia   · Mild DANIELLE/REM related sleep disordered breathing - recently restarted CPAP with benefit   · CAD s/p CABG 5/3/21  · Tracheomalacia    · Lower extremity edema  · Pulmonary Nodules have resolved  · Rheumatoid arthritis on Embrel and MTX and 4 mg prednisone  · Positive tobacco history, 20 pack year quit in 1991  · Diabetes     PLAN:   · Continue azithromycin daily  · Continue Daliresp; off Singulair  -- Failed Singulair, Failed Dulera, Failed Spiriva, Failed Advair. · I recommended regular use of CPAP.      · D/C home oxygen   · F/U will be with me in 4-6 months

## 2022-04-28 ENCOUNTER — TELEPHONE (OUTPATIENT)
Dept: CARDIOLOGY CLINIC | Age: 72
End: 2022-04-28

## 2022-04-29 ENCOUNTER — HOSPITAL ENCOUNTER (OUTPATIENT)
Dept: WOUND CARE | Age: 72
Discharge: HOME | End: 2022-04-29
Payer: MEDICARE

## 2022-04-29 VITALS
TEMPERATURE: 97 F | DIASTOLIC BLOOD PRESSURE: 71 MMHG | BODY MASS INDEX: 24.1 KG/M2 | SYSTOLIC BLOOD PRESSURE: 132 MMHG | HEIGHT: 68 IN | HEART RATE: 94 BPM | RESPIRATION RATE: 20 BRPM | WEIGHT: 159 LBS

## 2022-04-29 DIAGNOSIS — L92.9 HYPERGRANULATION: ICD-10-CM

## 2022-04-29 DIAGNOSIS — L89.623 PRESSURE ULCER OF LEFT HEEL, STAGE 3 (HCC): ICD-10-CM

## 2022-04-29 DIAGNOSIS — L89.154 PRESSURE ULCER OF COCCYGEAL REGION, STAGE 4 (HCC): ICD-10-CM

## 2022-04-29 DIAGNOSIS — R60.0 BILATERAL LOWER EXTREMITY EDEMA: ICD-10-CM

## 2022-04-29 DIAGNOSIS — I87.2 VENOUS INSUFFICIENCY OF LEFT LOWER EXTREMITY: Primary | ICD-10-CM

## 2022-04-29 PROCEDURE — 17250 CHEM CAUT OF GRANLTJ TISSUE: CPT | Performed by: INTERNAL MEDICINE

## 2022-04-29 PROCEDURE — 29581 APPL MULTLAYER CMPRN SYS LEG: CPT

## 2022-04-29 PROCEDURE — 97597 DBRDMT OPN WND 1ST 20 CM/<: CPT | Performed by: INTERNAL MEDICINE

## 2022-04-29 PROCEDURE — 17250 CHEM CAUT OF GRANLTJ TISSUE: CPT

## 2022-04-29 PROCEDURE — 97597 DBRDMT OPN WND 1ST 20 CM/<: CPT

## 2022-04-29 RX ORDER — BACITRACIN ZINC AND POLYMYXIN B SULFATE 500; 1000 [USP'U]/G; [USP'U]/G
OINTMENT TOPICAL ONCE
Status: CANCELLED | OUTPATIENT
Start: 2022-04-29 | End: 2022-04-29

## 2022-04-29 RX ORDER — LIDOCAINE 50 MG/G
OINTMENT TOPICAL ONCE
Status: CANCELLED | OUTPATIENT
Start: 2022-04-29 | End: 2022-04-29

## 2022-04-29 RX ORDER — LIDOCAINE 40 MG/G
CREAM TOPICAL ONCE
Status: CANCELLED | OUTPATIENT
Start: 2022-04-29 | End: 2022-04-29

## 2022-04-29 RX ORDER — LIDOCAINE 40 MG/G
CREAM TOPICAL ONCE
Status: DISCONTINUED | OUTPATIENT
Start: 2022-04-29 | End: 2022-04-30 | Stop reason: HOSPADM

## 2022-04-29 RX ORDER — LIDOCAINE HYDROCHLORIDE 40 MG/ML
SOLUTION TOPICAL ONCE
Status: CANCELLED | OUTPATIENT
Start: 2022-04-29 | End: 2022-04-29

## 2022-04-29 ASSESSMENT — PAIN SCALES - GENERAL: PAINLEVEL_OUTOF10: 0

## 2022-04-29 NOTE — PROGRESS NOTES
[facility] Physician Orders and Discharge [facility]  [address], [address]  [place], [facility]  Telephone: [phone]      Fax: [phone] home care [facility]  Nurse Narda Fox [phone]      Your wound-care supplies will be provided by: [employer] orders supplies through [employer]. Please note, depending on your insurance coverage, you may have out-of-pocket expenses for these supplies. Someone from [place] may call you to confirm your order and discuss those potential costs before they ship your products -- please anticipate that call. If your out-of-pocket cost is going to be less than $200, and [employer] cannot reach you, they may ship your supplies as soon as the order is processed, and will send you a bill. If your out-of-pocket cost is going to be more than $200, [employer] should not ship your supplies until they speak with you. If you have any questions about your supplies or your potential out-of-pocket costs, or if you need to place an order for a refill of supplies (typically monthly), Bhavani Cole is your local representative, and can be reached at [phone]. Information on [employer]'s return policy will also be enclosed with your supplies -- please read that carefully before opening your items.      NAME:  Gris Taveras   DATE of BIRTH:  1950  PRIMARY DIAGNOSIS FOR WOUND CARE CENTER:  Pressure ulcer     Wound cleansing:   Do not scrub or use excessive force. Wash hands with soap and water before and after dressing changes. Prior to applying a clean dressing, cleanse wound with normal saline, wound cleanser, or mild soap and water.  Ask your physician or nurse before getting the wound(s) wet in the shower.                Wound care for home:        Left Medial Heel, Deep tissue injury:  Aquaphor to dry skin of lower leg  Betadine to maegan wound let air dry then apply Calmoseptine  Collagen with silver  Double layer of plain foam  Compri 2 Lite  Keep dressing clean, dry and in place until next weeks visit           Sacral Wound:   Vashe or Hypochlorous acid soaked gauze applied to wound for 2-3 minutes, no need to rinse  Triad or Zinc Oxide to maegan-wound  Multidex Powder then Collagen with silver to wound bed & tuck into depth of wound  Fluff gauze over wound  Cover with small sacral mepilex border  HHC to change dressing on Monday, Wednesday, Friday next week        Please note, all wounds (unless stated otherwise here) were mechanically debrided at the time of cleansing here in the wound-care center today, so a small amount of pain, drainage or bleeding from that process might be expected, and is normal.      All products for home use, including multiple products for a single wound if applicable, are medically necessary in order to achieve the best chance at timely wound healing. See provider documentation for details if needed.     Substituted dressings applied in the [facility] today, if applicable:      [facility]     New orders for this week (labs, imaging, medications, etc.):             Additional instructions for specific diagnoses:     +ROHO cushion- use at [address] when sitting!!  +True Balance Air group II mattress      General comments for pressure ulcers:  *  Make sure you stay well-hydrated, and maintain good protein intake. *  Reposition at least every two hours, to keep from having pressure in one spot for too long. *  If you are not sure whether you have the best offloading surfaces (mattress, mattress overlay, chair cushion, heel-protector boots, etc), please ask. *  Moisturize your skin regularly with Vaseline, Aquaphor, Aveeno, CeraVe, Cetaphil, Eucerin, Lubriderm, etc; but keep the skin between your toes dry.   *  If you smoke, your wound can not heal properly -- please talk with us when you're ready to quit.         F/U Appointment is with Dr. Aldo Amador in 1 week on Corinne Fuller     Your nurse  is Zion Mcpherson RN      If we applied slip-resistant hospital socks today, be sure to remove them at least once a day to inspect your toes or feet, even if you're not changing the wraps or dressings underneath.  If you see anything concerning (redness, excess moisture, etc), please call and let us know right away.     Should you experience any significant changes in your wound(s) (including redness, increased warmth, increased pain, increased drainage, odor, or fever) or have questions about your wound care, please contact the [facility] at [phone] Monday-Thursday from 8:00 am - 4:30 pm, or Friday from 8:00 am - 2:30 pm.  If you need help with your wound outside these hours and cannot wait until we are again available, contact your home-care company (if applicable), your PCP, or go to the nearest emergency room

## 2022-04-29 NOTE — PLAN OF CARE
Patient with improvement noted in both wounds. Will continue to apply compression wrap to LE again this week and probably will be able to go back to every 2 weeks soon. Discharge instructions reviewed with patient, all questions answered, copy given to patient. Dressings were applied to all wounds per M.D. Instructions at this visit.

## 2022-04-30 NOTE — PROGRESS NOTES
[facility] Progress Note    Chiqui Villanueva     : 1950    DATE OF VISIT:  2022    Subjective:     Chiqui Villanueva is a 80 y.o. female who has a pressure ulcer located on the sacrum and foot (left , heel). Significant symptoms or pertinent wound history since last visit: feeling pretty well overall, no fever or chills, modest sacral drainage, doing well with offloading and dietary intake, tolerating LLE compression well, still a bit of pain at the heel ulcer. Additional ulcer(s) noted? no.      Her current medication list consists of Acapella, Insulin Syringe-Needle U-100, Roflumilast, albuterol sulfate HFA, atorvastatin, azithromycin, blood glucose test strips, calcium carbonate, cetirizine, clopidogrel, docusate sodium, fluticasone, gabapentin, insulin glargine, insulin lispro, ipratropium, latanoprost, metoprolol succinate, morphine, naloxone, omeprazole, ondansetron, oxyCODONE-acetaminophen, predniSONE, sacubitril-valsartan, spironolactone, torsemide, and vitamin D. Allergies: Atenolol    Objective:     Vitals:    22 0855   BP: 133/75   Pulse: 95   Resp: 20   Temp: 98.3 °F (36.8 °C)   TempSrc: Oral   SpO2: 99%   Weight: 156 lb 6 oz (70.9 kg)   Height: 5' 8\" (1.727 m)     AAOx3, fatigued, NAD  No cellulitis, angitis, fluctuance  No acute arthritis or bursitis  Milder LE edema again now  No contact dermatitis or cutaneous Candidiasis  Blanca-ulcer skin: indurated, pink, warm and dry, still a bit of hyperkeratosis at heel. Ulcer(s):  sacrum is slowly a bit smaller, more pink and red, almost all granulation now, with minimal fibrin and biofilm, less depth, no tunnels, mild undermining; heel ulcer a bit smaller, a bit more pink to red, granular, edges moving in better,  and less inflamed this week too. Photos also saved in electronic chart.     Today's wound measurements, per RN documentation:  Wound 21 #5, Left Heel, Pressure, Stage 3 (onset 12/10/2021)-Wound Length (cm): 0.2 cm  Wound 12/18/20 #2, Sacrum, Pressure Injury, Stage 4, Onset 5/2020-Wound Length (cm): 1.7 cm    Wound 12/20/21 #5, Left Heel, Pressure, Stage 3 (onset 12/10/2021)-Wound Width (cm): 1.2 cm  Wound 12/18/20 #2, Sacrum, Pressure Injury, Stage 4, Onset 5/2020-Wound Width (cm): 1 cm    Wound 12/20/21 #5, Left Heel, Pressure, Stage 3 (onset 12/10/2021)-Wound Depth (cm): 0.3 cm  Wound 12/18/20 #2, Sacrum, Pressure Injury, Stage 4, Onset 5/2020-Wound Depth (cm): 0.5 cm    Assessment:     Patient Active Problem List   Diagnosis Code    Rheumatoid arthritis ([facility] [place] [address] M06.9    Psoriasis L40.9    GERD (gastroesophageal reflux disease) K21.9    Anemia D64.9    Cylindrical bronchiectasis ([facility]) J47.9    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    Immunocompromised state ([facility] [place] [address] D84.9    Coronary artery disease I25.10    Bilateral lower extremity edema R60.0    Essential hypertension I10    Lumbar spondylosis M47.816    Mitral valve insufficiency and aortic valve insufficiency I08.0    Mixed hyperlipidemia E78.2    Myopia of both eyes H52.13    Osteoporosis M81.0    Other chronic sinusitis J32.8    Primary open angle glaucoma (POAG) of both eyes, mild stage H40.1131    Primary osteoarthritis of right hip M16.11    Type 2 diabetes mellitus with unspecified diabetic retinopathy without macular edema ([facility]) E11.319    Type 2 diabetes mellitus with diabetic peripheral angiopathy without gangrene, with long-term current use of insulin ([facility]) E11.51, Z79.4    Pressure ulcer of coccygeal region, stage 4 ([facility]) L89.154    Mild malnutrition ([facility]) E44.1    Chronic diastolic congestive heart failure ([facility]) I50.32    Chronic obstructive pulmonary disease ([facility]) J44.9    Hypergranulation L92.9    Nonrheumatic mitral (valve) insufficiency I34.0    Chronic respiratory failure with hypoxia ([facility]) J96.11    Pressure ulcer of left heel, stage 3 ([facility]) R38.473    Venous insufficiency of left lower extremity I87.2       Assessment of today's active condition(s): RA, decreased mobility, slowly healing stage 4 sacral pressure ulcer, no signs of infection. Also a post-hospitalization left heel pressure ulcer, was making slower progress, doing better with some additional foam for offloading, and compression of her chronic edema. Factors contributing to occurrence and/or persistence of the chronic ulcer include edema, venous stasis, diabetes, chronic pressure, decreased mobility, shear force and immunosuppression. Medical necessity of today's visit is shown by the above documentation. Sharp debridement is indicated today, based upon the exam findings in the wound(s) above. Procedure note:     Consent obtained. Time out performed per [address] policy. Anesthetic  Anesthetic: 4% Lidocaine Cream     Using a curette, I sharply debrided the sacrum ulcer(s) down through and including the removal of dermis. The type(s) of tissue debrided included fibrin and biofilm. Total Surface Area Debrided: 2 sq cm. The ulcers were then irrigated with normal saline solution. The procedure was completed with a small amount of bleeding, and hemostasis was with pressure. The patient tolerated the procedure well, with no significant complications. The patient's level of pain during and after the procedure was monitored. Post-debridement measurements, if different from pre-debridement, are in the flowsheet as well. Discharge plan:     Treatment in the wound care center today, per RN documentation: Wound 12/20/21 #5, Left Heel, Pressure, Stage 3 (onset 12/10/2021)-Dressing/Treatment: Other (comment) (Aquaphor,Betadine&Jenifer to maegan,CollagenAg,2Foam,[id number])  Wound 12/18/20 #2, Sacrum, Pressure Injury, Stage 4, Onset 5/2020-Dressing/Treatment: Other (comment) (Zinc to maegan,CollagenAg,Gauze,SacralMepilex).     Per physician order, left application of multilayer compression wrap was performed in the wound care center today, to help manage

## 2022-04-30 NOTE — PROGRESS NOTES
[facility] Progress Note    Venecia Babcock     : 1950    DATE OF VISIT:  2022    Subjective:     Venecia Babcock is a 80 y.o. female who has a pressure ulcer located on the sacrum and foot (left , heel). Significant symptoms or pertinent wound history since last visit: feeling well overall in terms of her wounds. Had some increased cough and dyspnea last week, saw pulmonary, had her steroid dose increased for the short term, and a course of doxy. No F/C/D, eating ok, offloading well. Additional ulcer(s) noted? no.      Her current medication list consists of Acapella, Insulin Syringe-Needle U-100, Roflumilast, albuterol sulfate HFA, atorvastatin, azithromycin, blood glucose test strips, calcium carbonate, cetirizine, clopidogrel, docusate sodium, doxycycline hyclate, fluticasone, gabapentin, insulin glargine, insulin lispro, ipratropium, latanoprost, metoprolol succinate, morphine, naloxone, omeprazole, ondansetron, oxyCODONE-acetaminophen, predniSONE, sacubitril-valsartan, spironolactone, torsemide, and vitamin D. Allergies: Atenolol    Objective:     Vitals:    22 0914   BP: 132/71   Pulse: 94   Resp: 20   Temp: 97 °F (36.1 °C)   TempSrc: Oral   Weight: 159 lb (72.1 kg)   Height: 5' 8\" (1.727 m)     AAOx3, fatigued, NAD  No cellulitis, angitis, fluctuance  No acute arthritis or bursitis  Milder LE edema again now  No contact dermatitis or cutaneous Candidiasis  Blanca-ulcer skin: indurated, pink, warm, less hyperkeratosis at heel, mild maceration at sacrum  Ulcer(s):  sacrum is slowly a bit smaller, more pink and red, almost all granulation now, clean, some recurrent peripheral hypergranulation, no tunnels, mild undermining; heel ulcer a bit smaller, more granular, but a bit of fibrin and biofilm, no signs of infection. Photos also saved in electronic chart.     Today's wound measurements, per RN documentation:  Wound 21 #5, Left Heel, Pressure, Stage 3 (onset 12/10/2021)-Wound Length (cm): 0.1 cm  Wound 12/18/20 #2, Sacrum, Pressure Injury, Stage 4, Onset 5/2020-Wound Length (cm): 1.7 cm    Wound 12/20/21 #5, Left Heel, Pressure, Stage 3 (onset 12/10/2021)-Wound Width (cm): 0.8 cm  Wound 12/18/20 #2, Sacrum, Pressure Injury, Stage 4, Onset 5/2020-Wound Width (cm): 1 cm    Wound 12/20/21 #5, Left Heel, Pressure, Stage 3 (onset 12/10/2021)-Wound Depth (cm): 0.2 cm  Wound 12/18/20 #2, Sacrum, Pressure Injury, Stage 4, Onset 5/2020-Wound Depth (cm): 0.5 cm    Assessment:     Patient Active Problem List   Diagnosis Code    Rheumatoid arthritis ([facility] [address] M06.9    Psoriasis L40.9    GERD (gastroesophageal reflux disease) K21.9    Anemia D64.9    Cylindrical bronchiectasis ([facility]) J47.9    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    Immunocompromised state ([facility] [address] D84.9    Coronary artery disease I25.10    Bilateral lower extremity edema R60.0    Essential hypertension I10    Lumbar spondylosis M47.816    Mitral valve insufficiency and aortic valve insufficiency I08.0    Mixed hyperlipidemia E78.2    Myopia of both eyes H52.13    Osteoporosis M81.0    Other chronic sinusitis J32.8    Primary open angle glaucoma (POAG) of both eyes, mild stage H40.1131    Primary osteoarthritis of right hip M16.11    Type 2 diabetes mellitus with unspecified diabetic retinopathy without macular edema ([facility]) E11.319    Type 2 diabetes mellitus with diabetic peripheral angiopathy without gangrene, with long-term current use of insulin ([facility]) E11.51, Z79.4    Pressure ulcer of coccygeal region, stage 4 (HCC) L89.154    Mild malnutrition ([facility]) E44.1    Chronic diastolic congestive heart failure (HCC) I50.32    Chronic obstructive pulmonary disease ([facility]) J44.9    Hypergranulation L92.9    Nonrheumatic mitral (valve) insufficiency I34.0    Chronic respiratory failure with hypoxia ([facility]) J96.11    Pressure ulcer of left heel, stage 3 ([facility]) K06.828    Venous insufficiency of left lower extremity I87.2       Assessment of today's active condition(s): RA, decreased mobility, slowly healing stage 4 sacral pressure ulcer, also a healing stage 3 left heel ulcer that did better after some debridement, better offloading, and compression of her chronic edema. Factors contributing to occurrence and/or persistence of the chronic ulcer include edema, venous stasis, diabetes, chronic pressure, decreased mobility, shear force and immunosuppression. Medical necessity of today's visit is shown by the above documentation. Sharp debridement is indicated today, based upon the exam findings in the wound(s) above. Procedure note:     Consent obtained. Time out performed per [facility] policy. Anesthetic  Anesthetic: 4% Lidocaine Cream     Using a curette, I sharply debrided the foot (left , heel) ulcer(s) down through and including the removal of dermis. The type(s) of tissue debrided included fibrin and biofilm. Total Surface Area Debrided: 1 sq cm. The ulcers were then irrigated with normal saline solution. The procedure was completed with a small amount of bleeding, and hemostasis was with pressure. The patient tolerated the procedure well, with no significant complications. The patient's level of pain during and after the procedure was monitored. Post-debridement measurements, if different from pre-debridement, are in the flowsheet as well.  ______________    To encourage better epithelial cell coverage, I did use AgNO3 to chemically cauterize hypergranulation tissue on the sacrum ulcer(s), after application of 4% lidocaine topical solution. This was tolerated well, with no pain or skin injury.      Discharge plan:     Treatment in the wound care center today, per RN documentation: Wound 12/20/21 #5, Left Heel, Pressure, Stage 3 (onset 12/10/2021)-Dressing/Treatment: Other (comment) (Aquaphor,Betadine&Jenifer(maegan),CollagenAg,2Foam,[id number])  Wound 12/18/20 #2, Sacrum, Pressure Injury, Stage 4, Onset 5/2020-Dressing/Treatment: Other (comment) (Jenifer(maegan)Betadine,,CollagenAg,Gauze,Mepilex). Per physician order, left application of multilayer compression wrap was performed in the wound care center today, to help manage edema, stasis dermatitis, and/or venous ulcers. Leave primary dressing and multi-layer wrap(s) in place until the next appointment. Also discussed ways to keep the wrap dry for a shower, including a plastic cast-guard, available in retail pharmacies. She should call before her next visit if there is any significant pain, significant strike-through of drainage to the surface of the wrap, or any significant sense of the wrap sliding down more than an inch or so, bunching-up and abrading her skin. I reminded the patient of the importance of weight management and smoking cessation, if applicable; also encouraged ambulation as tolerated, additional lower extremity exercises as instructed in our education sheet, leg elevation when at rest, and compliance with any recommended dietary, diuretic and compression therapies. Be sure to keep pushing protein intake; use ROHO, group 2 overlay mattress; [employer] boot when in bed. Home treatment: good handwashing before and after any dressing changes. Cleanse wound with saline or soap & water before dressing change. May use Vaseline (petrolatum), Aquaphor, Aveeno, CeraVe, Cetaphil, Eucerin, Lubriderm, etc for dry skin. Dressing type for home: for the sacrum, Vashe, periwound ZnO, Multidex, collagen, gauze, bordered foam, three times weekly. Written discharge instructions given to patient. Follow up in 1 week. Hopefully if the heel is basically healed next week, we can just do a protective dressing there, and get her back into her daily compression stocking instead of her weekly wrap, then back off visits to [facility] again, for the sacrum.     Electronically signed by Bruce Buchanan MD on 4/30/2022 at 4:59 PM.

## 2022-05-05 ENCOUNTER — OFFICE VISIT (OUTPATIENT)
Dept: CARDIOLOGY CLINIC | Age: 72
End: 2022-05-05
Payer: MEDICARE

## 2022-05-05 VITALS
HEART RATE: 81 BPM | BODY MASS INDEX: 23.64 KG/M2 | OXYGEN SATURATION: 96 % | DIASTOLIC BLOOD PRESSURE: 44 MMHG | WEIGHT: 156 LBS | SYSTOLIC BLOOD PRESSURE: 96 MMHG | HEIGHT: 68 IN

## 2022-05-05 DIAGNOSIS — I34.0 NONRHEUMATIC MITRAL VALVE REGURGITATION: ICD-10-CM

## 2022-05-05 DIAGNOSIS — I50.22 CHRONIC SYSTOLIC CONGESTIVE HEART FAILURE (HCC): Primary | ICD-10-CM

## 2022-05-05 DIAGNOSIS — E78.2 MIXED HYPERLIPIDEMIA: ICD-10-CM

## 2022-05-05 DIAGNOSIS — I25.10 CORONARY ARTERY DISEASE INVOLVING NATIVE CORONARY ARTERY OF NATIVE HEART WITHOUT ANGINA PECTORIS: ICD-10-CM

## 2022-05-05 PROCEDURE — 99214 OFFICE O/P EST MOD 30 MIN: CPT | Performed by: NURSE PRACTITIONER

## 2022-05-05 RX ORDER — ETANERCEPT 50 MG/ML
1 SOLUTION SUBCUTANEOUS WEEKLY
COMMUNITY

## 2022-05-05 RX ORDER — CYCLOBENZAPRINE HCL 10 MG
TABLET ORAL
COMMUNITY
Start: 2022-04-26

## 2022-05-05 RX ORDER — DULOXETIN HYDROCHLORIDE 60 MG/1
60 CAPSULE, DELAYED RELEASE ORAL 2 TIMES DAILY
COMMUNITY
Start: 2022-03-25

## 2022-05-05 ASSESSMENT — ENCOUNTER SYMPTOMS: SHORTNESS OF BREATH: 1

## 2022-05-05 NOTE — PROGRESS NOTES
[address]   Cardiology Note              Date:  May 5, 2022  Patientname: Santi Moon  YOB: 1950    Primary Care physician: Gen Eagle MD    HISTORY OF PRESENT ILLNESS: Santi Moon is a 80 y.o. female with a history of CAD, CHF, cardiomyopathy, MR, PVD, DM, DANIELLE, sacral ulcer, COPD, RA. She had right fem-pop bypass 9/2017. She was admitted 4/2021 for chest pain and troponin elevated. [facility]  4/26/2021 showed severe CAD. Echo showed EF 55%. On 5/3/2021 she had CABG x3, BIMAL clip. She required evacuation of hematoma 5/5/2021. She was admitted 7/2021 for cellulitis at vein harvest site requiring I&D on 7/13/2021. She was admitted 8/10/2021 for shortness of breath and treated for CHF. Echo showed EF 50%, possibly severe MR. [facility] 8/16/2021 showed occluded SVG-OM, suspect severe MR due to papillary dysfunction r/t occluded RCA/LCx. ABHISHEK 10/2021 showed EF 30%, mild MR, +grade 1 pulmonary AV malformation. Echo 3/2022 showed EF 30-35%, moderate MR. At office visit 4/2022, entresto started. Today she presents for follow up for CHF. Her shortness of breath is at baseline. She also has chronic dizziness with position changes, no worse since starting entresto. Edema has improved. She has no chest pain, rare palpitations. She is not very active, mostly limited by back pain. Home BP 100s/70s. She states she has gained weight over the past few months due to increased appetite.      Office weight 5/5/2022: 156 lbs  Office weight 4/19/2022: 156 lbs  Discharge weight 8/17/2021: 131 lbs    Past Medical History:   has a past medical history of Acute on chronic diastolic heart failure due to coronary artery disease ([address] Acute respiratory failure with hypoxia ([address] Atherosclerosis of native artery of right lower extremity with rest pain ([address] Back pain, Branch retinal vein occlusion, Bronchiectasis with acute exacerbation ([address] Cellulitis and abscess of left leg, Cellulitis of left lower extremity, CHF (congestive heart failure) ([facility]), Closed compression fracture of thoracic vertebra ([facility]), Closed fracture of facial bone with routine healing, Closed jaw fracture ([address] Community acquired pneumonia of left lower lobe of lung, Compression fracture of L1 lumbar vertebra (HCC), COPD (chronic obstructive pulmonary disease) ([address] COVID, Fracture of tibial plateau, closed, left, initial encounter, HCAP (healthcare-associated pneumonia), Minimally displaced zone I fracture of sacrum ([address] MRSA (methicillin resistant staph aureus) culture positive, MRSA (methicillin resistant Staphylococcus aureus), Mucus plugging of bronchi, NSTEMI (non-ST elevated myocardial infarction) ([address] Osteomyelitis of mandible, Osteoporosis with pathological fracture, Post herpetic neuralgia, Proximal humerus fracture, Rheumatoid arthritis ([address] Shingles, Sleep apnea, Status post incision and drainage, Surgical wound dehiscence, initial encounter (at E SVG harvest site), Temporal arteritis ([address] Tobacco use, Tracheomalacia, and Vitreous hemorrhage, right eye ([address] Past Surgical History:   has a past surgical history that includes hernia repair; Mandible fracture surgery; Foot surgery; Elbow surgery; Cataract removal; Tubal ligation; Knee arthroscopy; Kyphosis surgery; laminectomy; Spinal fusion; Septoplasty (05/07/2013); Artery surgery (05/30/2013); Upper gastrointestinal endoscopy (04/08/2014); bronchoscopy; bronchoscopy (N/A, 06/12/2019); Mandible fracture surgery (02/2020); back surgery (08/2020); other surgical history (01/08/2021); Pressure ulcer debridement (N/A, 01/08/2021); Artery Biopsy (Right, 03/01/2021); Coronary artery bypass graft (N/A, 05/03/2021); Mediastinoscopy (N/A, 05/05/2021); Cardiac surgery (05/03/2021); Leg Surgery (Left, 7/13/2021); IR MIDLINE CATH (7/14/2021); transesophageal echocardiogram (11/02/2021); and Colonoscopy.      Home Medications:    Prior to Admission medications    Medication Sig Start Date End Date Taking? Authorizing Provider   predniSONE (DELTASONE) 10 MG tablet Take 4 tabs by mouth for three days, then 3 tabs by mouth for 3 days, then 2 tabs by mouth for 3 days, then 1 tab by mouth for 3 days. 4/27/22 5/9/22  SAM Gusman   sacubitril-valsartan (ENTRESTO) 24-26 MG per tablet Take 1 tablet by mouth 2 times daily 4/19/22   Gloria Owens MD   torsemide (DEMADEX) 20 MG tablet Take 2 tablets by mouth daily If weight 131 lbs or less, decrease to 20 mg daily 2/22/22   Gloria Owens MD   metoprolol succinate (TOPROL XL) 25 MG extended release tablet Take 0.5 tablets by mouth daily 2/22/22   Gloria Owens MD   spironolactone (ALDACTONE) 25 MG tablet Take 1 tablet by mouth daily 2/22/22   Gloria Owens MD   clopidogrel (PLAVIX) 75 MG tablet Take 1 tablet by mouth daily 2/22/22   Gloria Owens MD   azithromycin (ZITHROMAX) 250 MG tablet Take 1 tablet by mouth daily 2/7/22   Lina Batres PA-C   predniSONE (DELTASONE) 1 MG tablet Take 4 mg by mouth daily    Historical Provider, MD   ondansetron (ZOFRAN) 4 MG tablet every 8 hours as needed  12/9/21   Historical Provider, MD   oxyCODONE-acetaminophen (PERCOCET)  MG per tablet Take 1 tablet by mouth daily. 1/11/22   Historical Provider, MD   insulin glargine (LANTUS) 100 UNIT/ML injection vial Inject 30 Units into the skin nightly 12/28/21   Anne Clay MD   DALIRESP 500 MCG tablet TAKE 1 TABLET BY MOUTH DAILY 6/23/21   Sarthak Gandhi MD   docusate sodium (COLACE) 100 MG capsule Take 100 mg by mouth 2 times daily as needed for Constipation    Historical Provider, MD   gabapentin (NEURONTIN) 300 MG capsule Take 300 mg by mouth in the morning and at bedtime.      Historical Provider, MD   latanoprost (XALATAN) 0.005 % ophthalmic solution Place 1 drop into both eyes nightly    Historical Provider, MD   NARCAN 4 MG/0.1ML LIQD nasal spray as needed  10/20/20   Historical Provider, MD   morphine (MS CONTIN) 15 MG extended release tablet 15 mg 2 times daily. 10/16/20   Historical Provider, MD   ipratropium (ATROVENT) 0.06 % nasal spray USE 2 SPRAYS BY NASAL ROUTE 2-4 TIMES DAILY  Patient taking differently: 3 times daily as needed  10/29/20   Janneth Blake MD   albuterol sulfate  (90 Base) MCG/ACT inhaler INHALE 2 PUFFS INTO THE LUNGS EVERY 4 HOURS AS NEEDED FOR WHEEZING 1/20/20   Stephanie London MD   vitamin D (CHOLECALCIFEROL) 1000 UNIT TABS tablet Take 5,000 Units by mouth daily     Historical Provider, MD   calcium carbonate (OSCAL) 500 MG TABS tablet Take 500 mg by mouth daily    Historical Provider, MD   atorvastatin (LIPITOR) 40 MG tablet Take 40 mg by mouth 10/16/18   Historical Provider, MD   Insulin Syringe-Needle U-100 31G X 5/16\" 0.5 ML MISC USE 5 TIMES DAILY 5/30/18   Historical Provider, MD   ACCU-CHEK CEDRICK PLUS strip TEST 4 TIMES DAILY 6/1/18   Historical Provider, MD   insulin lispro (HUMALOG) 100 UNIT/ML injection vial Inject 0-12 Units into the skin 3 times daily (with meals) 10/23/17   Yury Shah MD   Misc. Devices (ACAPELLA) MISC Take 1 Device by mouth as needed 9/2/15   LORENZO Flores - CNP   fluticasone [facility]) 50 MCG/ACT nasal spray INHALE 2 SPRAYS IN [facility] NOSTRIL DAILY 11/25/13   Janneth Blake MD   cetirizine (ZYRTEC) 10 MG tablet Take 10 mg by mouth daily. Historical Provider, MD   omeprazole (PRILOSEC) 40 MG capsule Take 40 mg by mouth daily     Historical Provider, MD     Allergies:  Atenolol    Social History:   reports that she quit smoking about 31 years ago. Her smoking use included cigarettes. She has a 20.00 pack-year smoking history. She has never used smokeless tobacco. She reports previous alcohol use. She reports that she does not use drugs. Family History: family history includes Asthma in an other family member; Diabetes in her brother, mother, and sister; Heart Disease in her brother and brother; Hypertension in her mother.     Review of Systems Review of Systems   Constitutional: Negative. Respiratory: Positive for shortness of breath. Cardiovascular: Positive for leg swelling. Negative for chest pain and palpitations. Neurological: Positive for dizziness. OBJECTIVE:    Vital signs:    BP (!) 96/44   Pulse 81   Ht 5' 8\" (1.727 m)   Wt 156 lb (70.8 kg)   SpO2 96%   BMI 23.72 kg/m²      Physical Exam:  Constitutional:  Comfortable and alert, NAD, appears older than stated age, frail  Eyes: PERRL, sclera nonicteric  Neck:  Supple, no masses, no thyroidmegaly, JVP <8  Skin:  Warm and dry; no rash or lesions; +sternal incision  Heart: Regular, normal apex, S1 and S2 normal, no M/G/R  Lungs:  Normal respiratory effort; clear; no wheezing/rhonchi/rales  Abdomen: soft, non tender, + bowel sounds  Extremities:  LLE wrapped, + edema, trace RLE edema  Neuro: alert and oriented, moves legs and arms equally, normal mood and affect    Data Reviewed:      Echo 3/2022:  Left ventricular systolic function is moderately reduced with ejection   fraction estimated at 30-35 %. Moderate global hypokinesis is present. Left ventricular size is severely increased. Heart is spherical.Wall   thickness is normal.   The left atrium is mildly dilated. The ascending aorta is mildly dilated at 3.6cm. Grade II diastolic dysfunction with elevated filling pressure. Moderate mitral regurgitation. Mild aortic regurgitation is present. Moderate tricuspid regurgitation. Normal systolic pulmonary artery pressure (SPAP) estimated at 37 mmHg (RA   pressure 8 mmHg). Mild pulmonic regurgitation present. 9- Limited echo less than 30% moderate MR    ABHISHEK 10/2021:   -- Ejection fraction is visually estimated to be 30 %. -- Mild mitral regurgitation. -- The left atrium is mildly dilated. There is no evidence of mass or   thrombus in the left atrium or appendage.    -- Bubble study was performed and showed grade I pulmonary arteriovenous   malformation ( < 30 bubbles in left ventricle after 3rd cardiac cycle). Moderate layered plaque is noted in the ascending aorta and arch. Echo 8/12/2021:  -Left ventricular cavity size is enlarged. LVESD=5.57 cm   -Overall left ventricular systolic function appears mildly reduced.   -Ejection fraction is visually estimated to be 50%. -There is mild hypokinesis of the basal to mid inferior and inferolateral   walls. -Grade II diastolic dysfunction with elevated LV filling pressures.   -The right ventricle is mildly enlarged.   -Right ventricular systolic function is normal.   -Mitral valve leaflets appear mildly thickened. -Mitral regurgitation that may be severe. -The aortic valve leaflets appear mildly thickened. -Mild aortic regurgitation.   -Moderate tricuspid regurgitation with a PASP of 57 mmHg.   -Trivial pulmonic regurgitation. Coronary angiogram 8/16/2021:  Findings:  Artery Findings/Result   LM Normal   LAD Mid 100% after large diagonal, distal supplied by LIMA   Cx 99% ostial, distal supplied by R-L collaterals   RI N/A   RCA 70% distal, % prox   S-O occluded   S-PLB patent   L-LAD  patent   LVEDP 8   LVG NA due to contrast    Intervention:         None   Post Cath Dx:       Severe native disease as above  Occluded S-OM  Suspect severe MR related to papillary dysfunction related to occluded distal RCA and Cx  Consider MVR when wound issues resolve if MR does not improved with medical therapy  Would followup with Dr. Marcelle Faustin    CABG 5/3/2021:  Urgent CABG X 3, with pedicled LIMA to LAD, single Greater Saphenous VG to PV br of RCA, separate single Greater SVG to OM2,  LAAppendage obliteration with 40 mm Atricure LA Clip, CPB, EVH Left Greater Saphenous vein, ABHISHEK, Epiaortic ultrasound, Doppler verification of grafts, Bilateral 5 level intercostal nerve block(Exparel), Platelet gel application. Sternal plating.     Echo 4/24/2021:  Normal left ventricle systolic function with an estimated ejection fraction   of 55%. No regional wall motion abnormalities are seen. Normal left ventricular diastolic filling pressure. Mild eccentric aortic regurgitation. Mild mitral and tricuspid regurgitation. Systolic pulmonary artery pressure (SPAP) is normal and estimated at 27 mmHg   (right atrial pressure 3 mmHg). Cardiology Labs Reviewed:   CBC: No results for input(s): WBC, HGB, HCT, PLT in the last 72 hours. BMP:No results for input(s): NA, K, CO2, BUN, CREATININE, LABGLOM, GLUCOSE in the last 72 hours. PT/INR: No results for input(s): PROTIME, INR in the last 72 hours. APTT:No results for input(s): APTT in the last 72 hours. FASTING LIPID PANEL:  Lab Results   Component Value Date    HDL 34 10/29/2021    HDL 43 07/22/2010    LDLCALC 61 10/29/2021    TRIG 85 10/29/2021     LIVER PROFILE:No results for input(s): AST, ALT, ALB in the last 72 hours. BNP:   Lab Results   Component Value Date    PROBNP 3,878 04/27/2022    PROBNP 3,215 03/17/2022    PROBNP 3,249 12/20/2021    PROBNP 5,973 12/13/2021    PROBNP 4,412 09/16/2021     Reviewed all labs and imaging today    Assessment:   Chronic systolic CHF: appears compensated   Cardiomyopathy, likely mixed ischemic and valvular: EF 30-35% on echo 4/2022; EF 55% prior to CABG 4/2021   - EF decreased with loss of SVG-OM and previous severe MR  Mitral regurgitation: moderate on echo 3/2022; mild on ABHISHEK 10/2021; severe on echo 8/2021; likely related to papillary dysfunction/occluded dRCA, LCx, SVG  CAD: no current angina; severe native disease and occluded SVG-OM on C 8/16/2021: s/p CABG x3 and BIMAL clip 5/3/2021  HTN: low BP noted  HLD: controlled, LDL 61, statin  DM: hgb a1c 7.7  DANIELLE  COPD  Tracheomalacia  Anemia  RA  LLE cellulitis: s/p I&D 7/13/2021 by Dr. Villanueva See  Chronic sacral decubitus ulcer: follows with wound clinic    Plan:   1. Continue entresto, toprol, spironolactone, torsemide, statin, plavix; off aspirin due to bruising  2.  Unable to uptitrate GDMT due to low BP  3. Check BP at home and call the office if consistently out of goal range  4. Optimize GDMT and reassess EF in follow up, if EF remains <35%, consider ICD  5.  Follow up in 1 month for medication titration, echo likely in 7/2022, and will see Dr. Mark Bynum in 8/2022    Ebony Teran, APRN-CNP  [facility]  [phone]

## 2022-05-05 NOTE — PATIENT INSTRUCTIONS
Everything looks great today, good job!   Continue current medications  Check BP and weight at home  Follow up in 1 month

## 2022-05-05 NOTE — LETTER
[address] [address]  Phone: [phone]  Fax: Javier Lu [address], APRN - CNP    May 5, 2022     Zaire Roman, [address]    Patient: Irineo Jones   MR Number: [id number]   YOB: 1950   Date of Visit: 5/5/2022       Dear Zaire Roman:    Thank you for referring Melany Zhou to me for evaluation/treatment. Below are the relevant portions of my assessment and plan of care. If you have questions, please do not hesitate to call me. I look forward to following Dejan Quinones along with you.     Sincerely,      Mary Ellen President, LORENZO - CNP

## 2022-05-06 ENCOUNTER — HOSPITAL ENCOUNTER (OUTPATIENT)
Dept: WOUND CARE | Age: 72
Discharge: HOME | End: 2022-05-06
Payer: MEDICARE

## 2022-05-06 VITALS
DIASTOLIC BLOOD PRESSURE: 59 MMHG | SYSTOLIC BLOOD PRESSURE: 101 MMHG | BODY MASS INDEX: 23.95 KG/M2 | RESPIRATION RATE: 18 BRPM | TEMPERATURE: 98.3 F | HEART RATE: 91 BPM | HEIGHT: 68 IN | WEIGHT: 158 LBS

## 2022-05-06 DIAGNOSIS — I87.2 VENOUS INSUFFICIENCY OF LEFT LOWER EXTREMITY: Primary | ICD-10-CM

## 2022-05-06 DIAGNOSIS — L92.9 HYPERGRANULATION: ICD-10-CM

## 2022-05-06 DIAGNOSIS — L89.154 PRESSURE ULCER OF COCCYGEAL REGION, STAGE 4 (HCC): ICD-10-CM

## 2022-05-06 DIAGNOSIS — R60.0 BILATERAL LOWER EXTREMITY EDEMA: ICD-10-CM

## 2022-05-06 DIAGNOSIS — L89.623 PRESSURE ULCER OF LEFT HEEL, STAGE 3 (HCC): ICD-10-CM

## 2022-05-06 PROCEDURE — 17250 CHEM CAUT OF GRANLTJ TISSUE: CPT | Performed by: INTERNAL MEDICINE

## 2022-05-06 PROCEDURE — 97597 DBRDMT OPN WND 1ST 20 CM/<: CPT

## 2022-05-06 PROCEDURE — 97597 DBRDMT OPN WND 1ST 20 CM/<: CPT | Performed by: INTERNAL MEDICINE

## 2022-05-06 PROCEDURE — 17250 CHEM CAUT OF GRANLTJ TISSUE: CPT

## 2022-05-06 RX ORDER — LIDOCAINE 40 MG/G
CREAM TOPICAL ONCE
Status: CANCELLED | OUTPATIENT
Start: 2022-05-06 | End: 2022-05-06

## 2022-05-06 RX ORDER — LIDOCAINE 40 MG/G
CREAM TOPICAL ONCE
Status: DISCONTINUED | OUTPATIENT
Start: 2022-05-06 | End: 2022-05-07 | Stop reason: HOSPADM

## 2022-05-06 RX ORDER — LIDOCAINE HYDROCHLORIDE 40 MG/ML
SOLUTION TOPICAL ONCE
Status: CANCELLED | OUTPATIENT
Start: 2022-05-06 | End: 2022-05-06

## 2022-05-06 RX ORDER — BACITRACIN ZINC AND POLYMYXIN B SULFATE 500; 1000 [USP'U]/G; [USP'U]/G
OINTMENT TOPICAL ONCE
Status: CANCELLED | OUTPATIENT
Start: 2022-05-06 | End: 2022-05-06

## 2022-05-06 RX ORDER — LIDOCAINE 50 MG/G
OINTMENT TOPICAL ONCE
Status: CANCELLED | OUTPATIENT
Start: 2022-05-06 | End: 2022-05-06

## 2022-05-06 ASSESSMENT — PAIN SCALES - GENERAL: PAINLEVEL_OUTOF10: 0

## 2022-05-06 NOTE — PROGRESS NOTES
215 [facility] Physician Orders and Discharge 800 [facility]  [address], [address]  [place], [facility]  Telephone: [phone]      Fax: [phone] home care Madiha Escobedo  Nurse Karthik Ramsey [phone]      Your wound-care supplies will be provided by: [facility] AT [facility] orders supplies through [employer]. Please note, depending on your insurance coverage, you may have out-of-pocket expenses for these supplies. Someone from [facility] may call you to confirm your order and discuss those potential costs before they ship your products -- please anticipate that call. If your out-of-pocket cost is going to be less than $200, and [employer] cannot reach you, they may ship your supplies as soon as the order is processed, and will send you a bill. If your out-of-pocket cost is going to be more than $200, [employer] should not ship your supplies until they speak with you. If you have any questions about your supplies or your potential out-of-pocket costs, or if you need to place an order for a refill of supplies (typically monthly), Yasmine Bae is your local representative, and can be reached at [phone]. Information on [employer]'s return policy will also be enclosed with your supplies -- please read that carefully before opening your items.      NAME:  Gris Taveras   DATE of BIRTH:  1950  PRIMARY DIAGNOSIS FOR WOUND CARE CENTER:  Pressure ulcer     Wound cleansing:   Do not scrub or use excessive force. Wash hands with soap and water before and after dressing changes. Prior to applying a clean dressing, cleanse wound with normal saline, wound cleanser, or mild soap and water.  Ask your physician or nurse before getting the wound(s) wet in the shower.                Wound care for home:        Left Medial Heel:  Benzoint let air dry then apply Vashe, Purachol Ag, hydrogel and mepilex border  Wear your compression garment this week        Sacral Wound:   Vashe or Hypochlorous acid soaked gauze applied to wound for 2-3 minutes, no need to rinse  Triad or Zinc Oxide to maegan-wound  Multidex Powder then Collagen with silver to wound bed & tuck into depth of wound  Fluff gauze over wound  Cover with small sacral mepilex border  HHC to change dressing on Monday, Wednesday, Friday next week        Please note, all wounds (unless stated otherwise here) were mechanically debrided at the time of cleansing here in the wound-care center today, so a small amount of pain, drainage or bleeding from that process might be expected, and is normal.      All products for home use, including multiple products for a single wound if applicable, are medically necessary in order to achieve the best chance at timely wound healing. See provider documentation for details if needed.     Substituted dressings applied in the [facility] today, if applicable:      [facility]     New orders for this week (labs, imaging, medications, etc.):             Additional instructions for specific diagnoses:     +ROHO cushion- use at [address] when sitting!!  +True Balance Air group II mattress      General comments for pressure ulcers:  *  Make sure you stay well-hydrated, and maintain good protein intake. *  Reposition at least every two hours, to keep from having pressure in one spot for too long. *  If you are not sure whether you have the best offloading surfaces (mattress, mattress overlay, chair cushion, heel-protector boots, etc), please ask. *  Moisturize your skin regularly with Vaseline, Aquaphor, Aveeno, CeraVe, Cetaphil, Eucerin, Lubriderm, etc; but keep the skin between your toes dry.   *  If you smoke, your wound can not heal properly -- please talk with us when you're ready to quit.         F/U Appointment is with Dr. Marivel Tanner in 1 week on Dejuan [address].     Your nurse  is Fady Ward RN      If we applied slip-resistant hospital socks today, be sure to remove them at least once a day to inspect your toes or feet, even if you're not changing the wraps or dressings underneath.  If you see anything concerning (redness, excess moisture, etc), please call and let us know right away.     Should you experience any significant changes in your wound(s) (including redness, increased warmth, increased pain, increased drainage, odor, or fever) or have questions about your wound care, please contact the [address] [place] [address] at [phone] Monday-Thursday from 8:00 am - 4:30 pm, or Friday from 8:00 am - 2:30 pm.  If you need help with your wound outside these hours and cannot wait until we are again available, contact your home-care company (if applicable), your PCP, or go to the nearest emergency room

## 2022-05-06 NOTE — PLAN OF CARE
Will begin wearing her compression garment to RLE this week and leave bandage we put on in place. Reminded to elevate and offload coccyx wound as she has been. Follow up in 1 week and once LE wound is healed will back off [address] [place] [address],[address] follow up to Q2W. Discharge instructions reviewed with patient, all questions answered, copy given to patient. Dressings were applied to all wounds per M.D. Instructions at this visit.

## 2022-05-09 ENCOUNTER — TELEPHONE (OUTPATIENT)
Dept: CARDIOLOGY CLINIC | Age: 72
End: 2022-05-09

## 2022-05-09 NOTE — TELEPHONE ENCOUNTER
Pt stated that she saw nplr on 05/05/2022 and was asked if she was feeling any dizziness. Pt stated that she did not at the time but the dizziness started on 05/06/2022. Pt stated that her bp has been about 75/43 and 98/49 throughout the weekend. Pt could not specify if the bp readings were taken in the morning or afternoon. Pt is currently still taking Entresto until she hears back from nplr. Please advise.

## 2022-05-09 NOTE — TELEPHONE ENCOUNTER
Decrease her entresto to 1/2 tablet of 24-26 mg BID, monitor BP/symptoms, call back Friday with update. Thanks!

## 2022-05-13 ENCOUNTER — HOSPITAL ENCOUNTER (OUTPATIENT)
Dept: WOUND CARE | Age: 72
Discharge: HOME | End: 2022-05-13
Payer: MEDICARE

## 2022-05-13 VITALS
HEIGHT: 68 IN | HEART RATE: 76 BPM | SYSTOLIC BLOOD PRESSURE: 123 MMHG | BODY MASS INDEX: 24.71 KG/M2 | TEMPERATURE: 98.2 F | WEIGHT: 163 LBS | RESPIRATION RATE: 18 BRPM | DIASTOLIC BLOOD PRESSURE: 62 MMHG

## 2022-05-13 DIAGNOSIS — R60.0 BILATERAL LOWER EXTREMITY EDEMA: ICD-10-CM

## 2022-05-13 DIAGNOSIS — I87.2 VENOUS INSUFFICIENCY OF LEFT LOWER EXTREMITY: Primary | ICD-10-CM

## 2022-05-13 DIAGNOSIS — L89.154 PRESSURE ULCER OF COCCYGEAL REGION, STAGE 4 (HCC): ICD-10-CM

## 2022-05-13 DIAGNOSIS — L92.9 HYPERGRANULATION: ICD-10-CM

## 2022-05-13 DIAGNOSIS — L89.623 PRESSURE ULCER OF LEFT HEEL, STAGE 3 (HCC): ICD-10-CM

## 2022-05-13 PROCEDURE — 17250 CHEM CAUT OF GRANLTJ TISSUE: CPT

## 2022-05-13 PROCEDURE — 97597 DBRDMT OPN WND 1ST 20 CM/<: CPT

## 2022-05-13 PROCEDURE — 17250 CHEM CAUT OF GRANLTJ TISSUE: CPT | Performed by: INTERNAL MEDICINE

## 2022-05-13 PROCEDURE — 97597 DBRDMT OPN WND 1ST 20 CM/<: CPT | Performed by: INTERNAL MEDICINE

## 2022-05-13 RX ORDER — LIDOCAINE 40 MG/G
CREAM TOPICAL ONCE
Status: DISCONTINUED | OUTPATIENT
Start: 2022-05-13 | End: 2022-05-14 | Stop reason: HOSPADM

## 2022-05-13 RX ORDER — LIDOCAINE 50 MG/G
OINTMENT TOPICAL ONCE
Status: CANCELLED | OUTPATIENT
Start: 2022-05-13 | End: 2022-05-13

## 2022-05-13 RX ORDER — LIDOCAINE 40 MG/G
CREAM TOPICAL ONCE
Status: CANCELLED | OUTPATIENT
Start: 2022-05-13 | End: 2022-05-13

## 2022-05-13 RX ORDER — LIDOCAINE HYDROCHLORIDE 40 MG/ML
SOLUTION TOPICAL ONCE
Status: CANCELLED | OUTPATIENT
Start: 2022-05-13 | End: 2022-05-13

## 2022-05-13 RX ORDER — BACITRACIN ZINC AND POLYMYXIN B SULFATE 500; 1000 [USP'U]/G; [USP'U]/G
OINTMENT TOPICAL ONCE
Status: CANCELLED | OUTPATIENT
Start: 2022-05-13 | End: 2022-05-13

## 2022-05-13 ASSESSMENT — PAIN SCALES - GENERAL: PAINLEVEL_OUTOF10: 0

## 2022-05-13 NOTE — TELEPHONE ENCOUNTER
BP improving. Continue 1/2 tablet on entresto through the weekend and on Monday increase back to full tablet BID.  Keep follow up

## 2022-05-13 NOTE — PLAN OF CARE
Heel wound is still very close to healing. No changes in overall wound care regime. Home Care remains in place for intermittent wound care. Discharge instructions reviewed with patient, all questions answered, copy given to patient. Dressings were applied to all wounds per M.D. Instructions at this visit.

## 2022-05-13 NOTE — PROGRESS NOTES
215 [facility] ([place], [place]) Physician Orders and Discharge 800 [facility]  [address], [address]  [place], [facility]  Telephone: [phone]      Fax: [phone]        Your home care Madiha 9  Nurse Micheal Muller [phone]      Your wound-care supplies will be provided by: [employer] orders supplies through [email or web address]. Please note, depending on your insurance coverage, you may have out-of-pocket expenses for these supplies. Someone from [place] may call you to confirm your order and discuss those potential costs before they ship your products -- please anticipate that call. If your out-of-pocket cost is going to be less than $200, and [employer] cannot reach you, they may ship your supplies as soon as the order is processed, and will send you a bill. If your out-of-pocket cost is going to be more than $200, [employer] should not ship your supplies until they speak with you. If you have any questions about your supplies or your potential out-of-pocket costs, or if you need to place an order for a refill of supplies (typically monthly), [employer] is your local representative, and can be reached at [phone]. Information on [employer]s return policy will also be enclosed with your supplies -- please read that carefully before opening your items.      NAME:  Gris Taveras   DATE of BIRTH:  1950  PRIMARY DIAGNOSIS FOR WOUND CARE CENTER:  Pressure ulcer     Wound cleansing:   Do not scrub or use excessive force. Wash hands with soap and water before and after dressing changes. Prior to applying a clean dressing, cleanse wound with normal saline, wound cleanser, or mild soap and water.  Ask your physician or nurse before getting the wound(s) wet in the shower.                Wound care for home:     Left Great Toe:  Antibiotic Ointment and band aid  Change daily       Left Medial Heel:  Benzoint let air dry then apply Vashe, Purachol Ag, hydrogel and mepilex border  Wear your compression garment this week        Sacral Wound:   Vashe or Hypochlorous acid soaked gauze applied to wound for 2-3 minutes, no need to rinse  Triad or Zinc Oxide to maegan-wound  Multidex Powder then Collagen with silver to wound bed & tuck into depth of wound  Fluff gauze over wound  Cover with small sacral mepilex border  C to change dressing on Monday, Wednesday, Friday next week        Please note, all wounds (unless stated otherwise here) were mechanically debrided at the time of cleansing here in the wound-care center today, so a small amount of pain, drainage or bleeding from that process might be expected, and is normal.      All products for home use, including multiple products for a single wound if applicable, are medically necessary in order to achieve the best chance at timely wound healing. See provider documentation for details if needed.     Substituted dressings applied in the [facility] today, if applicable:      [facility]     New orders for this week (labs, imaging, medications, etc.):             Additional instructions for specific diagnoses:     +ROHO cushion- use at [address] when sitting!!  +True Balance Air group II mattress      General comments for pressure ulcers:  *  Make sure you stay well-hydrated, and maintain good protein intake. *  Reposition at least every two hours, to keep from having pressure in one spot for too long. *  If you are not sure whether you have the best offloading surfaces (mattress, mattress overlay, chair cushion, heel-protector boots, etc), please ask. *  Moisturize your skin regularly with Vaseline, Aquaphor, Aveeno, CeraVe, Cetaphil, Eucerin, Lubriderm, etc; but keep the skin between your toes dry.   *  If you smoke, your wound can not heal properly -- please talk with us when you're ready to quit.         F/U Appointment is with Dr. Katarina Alexandre in 1 week on [facility]                       .     Your nurse  is ABRAHAM Martinez      If we applied slip-resistant hospital socks today, be sure to remove them at least once a day to inspect your toes or feet, even if you're not changing the wraps or dressings underneath.  If you see anything concerning (redness, excess moisture, etc), please call and let us know right away.     Should you experience any significant changes in your wound(s) (including redness, increased warmth, increased pain, increased drainage, odor, or fever) or have questions about your wound care, please contact the [facility] at [phone] Monday-Thursday from 8:00 am - 4:30 pm, or Friday from 8:00 am - 2:30 pm.  If you need help with your wound outside these hours and cannot wait until we are again available, contact your home-care company (if applicable), your PCP, or go to the nearest emergency room

## 2022-05-20 ENCOUNTER — HOSPITAL ENCOUNTER (OUTPATIENT)
Dept: WOUND CARE | Age: 72
Discharge: HOME | End: 2022-05-20
Payer: MEDICARE

## 2022-05-20 VITALS
SYSTOLIC BLOOD PRESSURE: 102 MMHG | WEIGHT: 163.4 LBS | TEMPERATURE: 98.2 F | HEIGHT: 68 IN | RESPIRATION RATE: 18 BRPM | BODY MASS INDEX: 24.77 KG/M2 | HEART RATE: 75 BPM | DIASTOLIC BLOOD PRESSURE: 50 MMHG

## 2022-05-20 DIAGNOSIS — L89.623 PRESSURE ULCER OF LEFT HEEL, STAGE 3 (HCC): ICD-10-CM

## 2022-05-20 DIAGNOSIS — I87.2 VENOUS INSUFFICIENCY OF LEFT LOWER EXTREMITY: Primary | ICD-10-CM

## 2022-05-20 DIAGNOSIS — L92.9 HYPERGRANULATION: ICD-10-CM

## 2022-05-20 DIAGNOSIS — R60.0 BILATERAL LOWER EXTREMITY EDEMA: ICD-10-CM

## 2022-05-20 DIAGNOSIS — L89.154 PRESSURE ULCER OF COCCYGEAL REGION, STAGE 4 (HCC): ICD-10-CM

## 2022-05-20 PROCEDURE — 97597 DBRDMT OPN WND 1ST 20 CM/<: CPT | Performed by: INTERNAL MEDICINE

## 2022-05-20 PROCEDURE — 29581 APPL MULTLAYER CMPRN SYS LEG: CPT

## 2022-05-20 PROCEDURE — 17250 CHEM CAUT OF GRANLTJ TISSUE: CPT

## 2022-05-20 PROCEDURE — 97597 DBRDMT OPN WND 1ST 20 CM/<: CPT

## 2022-05-20 PROCEDURE — 17250 CHEM CAUT OF GRANLTJ TISSUE: CPT | Performed by: INTERNAL MEDICINE

## 2022-05-20 PROCEDURE — 29581 APPL MULTLAYER CMPRN SYS LEG: CPT | Performed by: INTERNAL MEDICINE

## 2022-05-20 RX ORDER — LIDOCAINE HYDROCHLORIDE 40 MG/ML
SOLUTION TOPICAL ONCE
Status: CANCELLED | OUTPATIENT
Start: 2022-05-20 | End: 2022-05-20

## 2022-05-20 RX ORDER — LIDOCAINE 40 MG/G
CREAM TOPICAL ONCE
Status: CANCELLED | OUTPATIENT
Start: 2022-05-20 | End: 2022-05-20

## 2022-05-20 RX ORDER — LIDOCAINE 50 MG/G
OINTMENT TOPICAL ONCE
Status: CANCELLED | OUTPATIENT
Start: 2022-05-20 | End: 2022-05-20

## 2022-05-20 RX ORDER — LIDOCAINE 40 MG/G
CREAM TOPICAL ONCE
Status: DISCONTINUED | OUTPATIENT
Start: 2022-05-20 | End: 2022-05-21 | Stop reason: HOSPADM

## 2022-05-20 RX ORDER — BACITRACIN ZINC AND POLYMYXIN B SULFATE 500; 1000 [USP'U]/G; [USP'U]/G
OINTMENT TOPICAL ONCE
Status: CANCELLED | OUTPATIENT
Start: 2022-05-20 | End: 2022-05-20

## 2022-05-20 NOTE — PLAN OF CARE
Patient is noted to have more edema in LLE this week. Upon review of her compression socks MD noted that they were not appropriate for any compression. MD gave patient a handout and patient will look into getting appropriate long term compression for overall edema control. We are applying compression wrap today in wound clinic. Otherwise no changes in dressings this week. Discharge instructions reviewed with patient, all questions answered, copy given to patient. Dressings were applied to all wounds per M.D. Instructions at this visit.

## 2022-05-20 NOTE — PROGRESS NOTES
215 [facility] Physician Orders and Discharge 800 [facility]  [address], [address]  [place], [facility]  Telephone: [phone]      Fax: [phone]        Your home care Madiha Escobedo  Nurse Cristhian Ashley [phone]      Your wound-care supplies will be provided by: [facility] AT [facility] orders supplies through [employer]. Please note, depending on your insurance coverage, you may have out-of-pocket expenses for these supplies. Someone from [place] may call you to confirm your order and discuss those potential costs before they ship your products -- please anticipate that call. If your out-of-pocket cost is going to be less than $200, and [employer] cannot reach you, they may ship your supplies as soon as the order is processed, and will send you a bill. If your out-of-pocket cost is going to be more than $200, [employer] should not ship your supplies until they speak with you. If you have any questions about your supplies or your potential out-of-pocket costs, or if you need to place an order for a refill of supplies (typically monthly), Stephany Sanchez is your local representative, and can be reached at [phone]. Information on [employer]'s return policy will also be enclosed with your supplies -- please read that carefully before opening your items.      NAME:  Gris Taveras   DATE of BIRTH:  1950  PRIMARY DIAGNOSIS FOR WOUND CARE CENTER:  Pressure ulcer     Wound cleansing:   Do not scrub or use excessive force. Wash hands with soap and water before and after dressing changes. Prior to applying a clean dressing, cleanse wound with normal saline, wound cleanser, or mild soap and water.  Ask your physician or nurse before getting the wound(s) wet in the shower.                Wound care for home:     Right Lower Leg:    Apply medium compression Spandagrip (give extra today)      Left Medial Heel/Leg:  Triamcinolone to leg for itching  Vashe, Purachol Ag, Plain Foam  Compri 2 Lite  Keep clean dry and in place for the week        Sacral Wound:   Vashe or Hypochlorous acid soaked gauze applied to wound for 2-3 minutes, no need to rinse  Triad or Zinc Oxide to maegan-wound  Multidex Powder then Collagen with silver to wound bed & tuck into depth of wound  Fluff gauze over wound  Cover with small sacral mepilex border  [facility] to change dressing on Monday, Wednesday, Friday next week        Please note, all wounds (unless stated otherwise here) were mechanically debrided at the time of cleansing here in the wound-care center today, so a small amount of pain, drainage or bleeding from that process might be expected, and is normal.      All products for home use, including multiple products for a single wound if applicable, are medically necessary in order to achieve the best chance at timely wound healing. See provider documentation for details if needed.     Substituted dressings applied in the [facility] today, if applicable:      [facility]     New orders for this week (labs, imaging, medications, etc.):      Dr. Katarina Alexandre gave you a handout today on obtaining proper Compression garments            Additional instructions for specific diagnoses:     +ROHO cushion- use at ALL TIMES when sitting!!  +True Balance Air group II mattress      General comments for pressure ulcers:  *  Make sure you stay well-hydrated, and maintain good protein intake. *  Reposition at least every two hours, to keep from having pressure in one spot for too long. *  If you are not sure whether you have the best offloading surfaces (mattress, mattress overlay, chair cushion, heel-protector boots, etc), please ask. *  Moisturize your skin regularly with Vaseline, Aquaphor, Aveeno, CeraVe, Cetaphil, Eucerin, Lubriderm, etc; but keep the skin between your toes dry.   *  If you smoke, your wound can not heal properly -- please talk with us when you're ready to quit.         F/U Appointment is with  Marcio in 1 week on                                                at                       .     Your nurse  is Alysha Busby RN      If we applied slip-resistant hospital socks today, be sure to remove them at least once a day to inspect your toes or feet, even if you're not changing the wraps or dressings underneath.  If you see anything concerning (redness, excess moisture, etc), please call and let us know right away.     Should you experience any significant changes in your wound(s) (including redness, increased warmth, increased pain, increased drainage, odor, or fever) or have questions about your wound care, please contact the [address] [place] [address] at [phone] Monday-Thursday from 8:00 am - 4:30 pm, or Friday from 8:00 am - 2:30 pm.  If you need help with your wound outside these hours and cannot wait until we are again available, contact your home-care company (if applicable), your PCP, or go to the nearest emergency room

## 2022-05-23 NOTE — PROGRESS NOTES
88 [facility] Progress Note    Desiree Ramos     : 1950    DATE OF VISIT:  2022    Subjective:     Desiree Ramos is a 80 y.o. female who has a pressure ulcer located on the sacrum and foot (left , heel). Significant symptoms or pertinent wound history since last visit: feeling ok, not much pain, less leg swelling, no fever, doing well with nutritional intake. Has been sitting up more recently, just trying to take advantage of the nicer weather, and feeling better, but I think she's getting a bit more sacral pressure now. Additional ulcer(s) noted? no.      Her current medication list consists of Acapella, DULoxetine, Etanercept, Insulin Syringe-Needle U-100, Menthol (Topical Analgesic), Roflumilast, albuterol sulfate HFA, atorvastatin, azithromycin, blood glucose test strips, calcium carbonate, cetirizine, clopidogrel, cyclobenzaprine, docusate sodium, fluticasone, gabapentin, insulin glargine, insulin lispro, ipratropium, latanoprost, metoprolol succinate, morphine, naloxone, omeprazole, ondansetron, oxyCODONE-acetaminophen, predniSONE, sacubitril-valsartan, spironolactone, torsemide, and vitamin D.     Allergies: Atenolol    Objective:     Vitals:    22 0935   BP: (!) 101/59   Pulse: 91   Resp: 18   Temp: 98.3 °F (36.8 °C)   TempSrc: Oral   Weight: 158 lb (71.7 kg)   Height: 5' 8\" (1.727 m)     AAOx3, fatigued, NAD  No cellulitis, angitis, fluctuance  No acute arthritis or bursitis  Milder LE edema again now  No contact dermatitis or cutaneous Candidiasis  Blanca-ulcer skin: indurated, pink, warm, less hyperkeratosis at heel, mild maceration and more hyperkeratosis at sacrum  Ulcer(s):  sacrum is slowly a bit smaller, more pink and red, almost all granulation now, clean, some recurrent peripheral hypergranulation, no tunnels, mild undermining; heel ulcer a bit smaller, more granular, but a bit of fibrin and biofilm, no signs of infection -- moving very slowly, even when compressed and offloaded, I think because of her DM and her RA meds. Photos also saved in electronic chart.     Today's wound measurements, per RN documentation:  Wound 12/20/21 #5, Left Heel, Pressure, Stage 3 (onset 12/10/2021)-Wound Length (cm): 0.1 cm  Wound 12/18/20 #2, Sacrum, Pressure Injury, Stage 4, Onset 5/2020-Wound Length (cm): 1.5 cm    Wound 12/20/21 #5, Left Heel, Pressure, Stage 3 (onset 12/10/2021)-Wound Width (cm): 0.5 cm  Wound 12/18/20 #2, Sacrum, Pressure Injury, Stage 4, Onset 5/2020-Wound Width (cm): 1 cm    Wound 12/20/21 #5, Left Heel, Pressure, Stage 3 (onset 12/10/2021)-Wound Depth (cm): 0.3 cm  Wound 12/18/20 #2, Sacrum, Pressure Injury, Stage 4, Onset 5/2020-Wound Depth (cm): 0.3 cm    Assessment:     Patient Active Problem List   Diagnosis Code    Rheumatoid arthritis ([facility] M06.9    Psoriasis L40.9    GERD (gastroesophageal reflux disease) K21.9    Anemia D64.9    Cylindrical bronchiectasis ([facility]) J47.9    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    Immunocompromised state ([facility] D84.9    Coronary artery disease I25.10    Bilateral lower extremity edema R60.0    Essential hypertension I10    Lumbar spondylosis M47.816    Mitral valve insufficiency and aortic valve insufficiency I08.0    Mixed hyperlipidemia E78.2    Myopia of both eyes H52.13    Osteoporosis M81.0    Other chronic sinusitis J32.8    Primary open angle glaucoma (POAG) of both eyes, mild stage H40.1131    Primary osteoarthritis of right hip M16.11    Type 2 diabetes mellitus with unspecified diabetic retinopathy without macular edema ([facility]) E11.319    Type 2 diabetes mellitus with diabetic peripheral angiopathy without gangrene, with long-term current use of insulin ([facility]) E11.51, Z79.4    Pressure ulcer of coccygeal region, stage 4 ([facility]) L89.154    Mild malnutrition ([facility]) E44.1    Chronic diastolic congestive heart failure ([facility]) I50.32    Chronic obstructive pulmonary disease ([facility]) J44.9    Hypergranulation L92.9    Nonrheumatic mitral (valve) insufficiency I34.0    Chronic respiratory failure with hypoxia ([facility]) J96.11    Pressure ulcer of left heel, stage 3 ([facility]) P45.227    Venous insufficiency of left lower extremity I87.2       Assessment of today's active condition(s): RA, poor mobility, venous insufficiency, LE edema, slowly healing sacral and heel pressure ulcers, no signs of infection. Factors contributing to occurrence and/or persistence of the chronic ulcer include venous stasis, lymphedema, diabetes, chronic pressure, decreased mobility, shear force and immunosuppression. Medical necessity of today's visit is shown by the above documentation. Sharp debridement is indicated today, based upon the exam findings in the wound(s) above. Procedure note:     Consent obtained. Time out performed per [facility]. Anesthetic  Anesthetic: 4% Lidocaine Cream     Using a curette, I sharply debrided the foot (left , heel) ulcer(s) down through and including the removal of dermis. The type(s) of tissue debrided included fibrin and biofilm. Total Surface Area Debrided: 1 sq cm. The ulcers were then irrigated with normal saline solution. The procedure was completed with a small amount of bleeding, and hemostasis was with pressure. The patient tolerated the procedure well, with no significant complications. The patient's level of pain during and after the procedure was monitored. Post-debridement measurements, if different from pre-debridement, are in the flowsheet as well.  ________________    To encourage better epithelial cell coverage, I did use AgNO3 to chemically cauterize hypergranulation tissue on the sacrum ulcer(s), after application of 4% lidocaine topical solution. This was tolerated well, with no pain or skin injury.      Discharge plan:     Treatment in the wound care center today, per RN documentation: Wound 12/20/21 #5, Left Heel, Pressure, Stage 3 (onset 12/10/2021)-Dressing/Treatment:  (Jenifer (maegan), collagen w/AG, mepilex border)  Wound 12/18/20 #2, Sacrum, Pressure Injury, Stage 4, Onset 5/2020-Dressing/Treatment:  (ZnO2(maegan),Collagen/AG, mepilex border). Keep heel dressing in place for the week. I reminded the patient of the importance of weight management and smoking cessation, if applicable; also encouraged ambulation as tolerated, additional lower extremity exercises as instructed in our education sheet, leg elevation when at rest, and compliance with any recommended dietary, diuretic and compression therapies. Can get back to her daily compression stocking on that left leg now, with the heel being almost healed. Keep focused on protein intake and offloading - a bit more time back in bed during the day could really help. Home treatment: good handwashing before and after any dressing changes. Cleanse wound with saline or soap & water before dressing change. May use Vaseline (petrolatum), Aquaphor, Aveeno, CeraVe, Cetaphil, Eucerin, Lubriderm, etc for dry skin. Dressing type for home: for the sacrum, Vashe, periwound ZnO, Multidex, collagen, dry dressing, every day or two, as needed. Written discharge instructions given to patient. Follow up in 1 week.     Electronically signed by Bruce Buchanan MD on 5/23/2022 at 2:26 PM.

## 2022-05-23 NOTE — PROGRESS NOTES
[facility] Progress Note    Lou Boone     : 1950    DATE OF VISIT:  2022    Subjective:     Lou Boone is a 80 y.o. female who has a pressure ulcer located on the sacrum and foot (left , heel). Significant symptoms or pertinent wound history since last visit: feeling ok overall, back to better offloading habits again, eating well, no fever, a bit more leg edema but nothing worrisome. Additional ulcer(s) noted? She recently had a small (presumably traumatic) hemorrhagic bulla on the dorsum of her left great toe -- that's now opened up to a small ulcer, but superficial, clean, just a bit of loose biofilm and debris easily removed with cleansing. Her current medication list consists of Acapella, DULoxetine, Etanercept, Insulin Syringe-Needle U-100, Menthol (Topical Analgesic), Roflumilast, albuterol sulfate HFA, atorvastatin, azithromycin, blood glucose test strips, calcium carbonate, cetirizine, clopidogrel, cyclobenzaprine, docusate sodium, fluticasone, gabapentin, insulin glargine, insulin lispro, ipratropium, latanoprost, metoprolol succinate, morphine, naloxone, omeprazole, ondansetron, oxyCODONE-acetaminophen, predniSONE, sacubitril-valsartan, spironolactone, torsemide, and vitamin D.     Allergies: Atenolol    Objective:     Vitals:    22 0916 22 0928   BP:  123/62   Pulse:  76   Resp: 18    Temp: 98.2 °F (36.8 °C)    TempSrc: Oral    Weight: 163 lb (73.9 kg)    Height: 5' 8\" (1.727 m)      AAOx3, fatigued, NAD  No cellulitis, angitis, fluctuance  No acute arthritis or bursitis  Milder LE edema again now  No contact dermatitis or cutaneous Candidiasis  Blanca-ulcer skin: indurated, pink, warm, less hyperkeratosis at heel, mild maceration and less hyperkeratosis at sacrum  Ulcer(s):  sacrum is slowly a bit smaller, more pink and red, almost all granulation now, clean, some recurrent peripheral hypergranulation, no tunnels, mild undermining; heel ulcer a bit smaller, more granular, but a bit of fibrin and biofilm, no signs of infection -- moving very slowly, even when compressed and offloaded, I think because of her DM and her RA meds. Photos also saved in electronic chart.     Today's wound measurements, per RN documentation:  Wound 12/20/21 #5, Left Heel, Pressure, Stage 3 (onset 12/10/2021)-Wound Length (cm): 0.5 cm  Wound 12/18/20 #2, Sacrum, Pressure Injury, Stage 4, Onset 5/2020-Wound Length (cm): 1.2 cm  [REMOVED] Wound 05/13/22 #6, Left Great Toe, Diabetic, Solis 1, Onset 5/4/22-Wound Length (cm): 0.1 cm    Wound 12/20/21 #5, Left Heel, Pressure, Stage 3 (onset 12/10/2021)-Wound Width (cm): 0.1 cm  Wound 12/18/20 #2, Sacrum, Pressure Injury, Stage 4, Onset 5/2020-Wound Width (cm): 1 cm  [REMOVED] Wound 05/13/22 #6, Left Great Toe, Diabetic, Solis 1, Onset 5/4/22-Wound Width (cm): 0.1 cm    Wound 12/20/21 #5, Left Heel, Pressure, Stage 3 (onset 12/10/2021)-Wound Depth (cm): 0.2 cm  Wound 12/18/20 #2, Sacrum, Pressure Injury, Stage 4, Onset 5/2020-Wound Depth (cm): 0.4 cm  [REMOVED] Wound 05/13/22 #6, Left Great Toe, Diabetic, Solis 1, Onset 5/4/22-Wound Depth (cm): 0.1 cm    Assessment:     Patient Active Problem List   Diagnosis Code    Rheumatoid arthritis ([facility] M06.9    Psoriasis L40.9    GERD (gastroesophageal reflux disease) K21.9    Anemia D64.9    Cylindrical bronchiectasis (HCC) J47.9    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    Immunocompromised state ([facility] [address] [facility] D84.9    Coronary artery disease I25.10    Bilateral lower extremity edema R60.0    Essential hypertension I10    Lumbar spondylosis M47.816    Mitral valve insufficiency and aortic valve insufficiency I08.0    Mixed hyperlipidemia E78.2    Myopia of both eyes H52.13    Osteoporosis M81.0    Other chronic sinusitis J32.8    Primary open angle glaucoma (POAG) of both eyes, mild stage H40.1131    Primary osteoarthritis of right hip M16.11    Type 2 diabetes mellitus with unspecified diabetic retinopathy without macular edema ([facility]) E11.319    Type 2 diabetes mellitus with diabetic peripheral angiopathy without gangrene, with long-term current use of insulin ([facility]) E11.51, Z79.4    Pressure ulcer of coccygeal region, stage 4 ([facility]) L89.154    Mild malnutrition ([facility]) E44.1    Chronic diastolic congestive heart failure (HCC) I50.32    Chronic obstructive pulmonary disease ([facility]) J44.9    Hypergranulation L92.9    Nonrheumatic mitral (valve) insufficiency I34.0    Chronic respiratory failure with hypoxia ([facility]) J96.11    Pressure ulcer of left heel, stage 3 ([facility]) D02.712    Venous insufficiency of left lower extremity I87.2       Assessment of today's active condition(s): RA, decreased mobility, very slowly healing sacral and heel pressure ulcers, no signs of infection or ischemia. Factors contributing to occurrence and/or persistence of the chronic ulcer include venous stasis, lymphedema, diabetes, chronic pressure, decreased mobility, shear force and immunosuppression. Medical necessity of today's visit is shown by the above documentation. Sharp debridement is indicated today, based upon the exam findings in the wound(s) above. Procedure note:     Consent obtained. Time out performed per [facility]. Anesthetic  Anesthetic: 4% Lidocaine Cream     Using a curette, I sharply debrided the foot (left , heel) ulcer(s) down through and including the removal of dermis. The type(s) of tissue debrided included fibrin and biofilm. Total Surface Area Debrided: 1 sq cm. The ulcers were then irrigated with normal saline solution. The procedure was completed with a small amount of bleeding, and hemostasis was with pressure. The patient tolerated the procedure well, with no significant complications. The patient's level of pain during and after the procedure was monitored.  Post-debridement measurements, if different from pre-debridement, are in the flowsheet as well.  ________________    To encourage better epithelial cell coverage, I did use AgNO3 to chemically cauterize hypergranulation tissue on the sacrum ulcer(s), after application of 4% lidocaine topical solution. This was tolerated well, with no pain or skin injury. Discharge plan:     Treatment in the wound care center today, per RN documentation: Wound 12/20/21 #5, Left Heel, Pressure, Stage 3 (onset 12/10/2021)-Dressing/Treatment: Other (comment) (Benzoin,PuracholAg,Hydrogel,MepilexBorder)  Wound 12/18/20 #2, Sacrum, Pressure Injury, Stage 4, Onset 5/2020-Dressing/Treatment: Other (comment) (Yuliana Michael with Silver,Gauze,MepilexBorder)  [REMOVED] Wound 05/13/22 #6, Left Great Toe, Diabetic, Solis 1, Onset 5/4/22-Dressing/Treatment: Other (comment) (AntibioticOintment,Band-Aid). Keep that left heel dressing in place for the week again. Keep up with offloading practices as recommended. I reminded the patient of the importance of weight management and smoking cessation, if applicable; also encouraged ambulation as tolerated, additional lower extremity exercises as instructed in our education sheet, leg elevation when at rest, and compliance with any recommended dietary, diuretic and compression therapies. Continue her own compression socks for now. Home treatment: good handwashing before and after any dressing changes. Cleanse wound with saline or soap & water before dressing change. May use Vaseline (petrolatum), Aquaphor, Aveeno, CeraVe, Cetaphil, Eucerin, Lubriderm, etc for dry skin. Dressing type for home: as above for the toe, daily, and then for the sacrum, her usual Vashe, ZnO, Multidex, collagen, dry dressing, every other day. Written discharge instructions given to patient. Follow up in 1 week.     Electronically signed by Fransisca Matthew MD on 5/23/2022 at 2:31 PM.

## 2022-05-23 NOTE — PROGRESS NOTES
[facility] Progress Note    Venecia Babcock     : 1950    DATE OF VISIT:  2022    Subjective:     Venecia Babcock is a 80 y.o. female who has a pressure ulcer located on the sacrum and foot (left , heel). Significant symptoms or pertinent wound history since last visit: feeling well overall, little wound pain, no fever, doing well with offloading. She brought what she was using as \"compression socks\" this week, however, and they're really not compressive at all. Edema is increased again, heel ulcer still open. Additional ulcer(s) noted? no. Toe healed up quickly. Her current medication list consists of Acapella, DULoxetine, Etanercept, Insulin Syringe-Needle U-100, Menthol (Topical Analgesic), Roflumilast, albuterol sulfate HFA, atorvastatin, azithromycin, blood glucose test strips, calcium carbonate, cetirizine, clopidogrel, cyclobenzaprine, docusate sodium, fluticasone, gabapentin, insulin glargine, insulin lispro, ipratropium, latanoprost, metoprolol succinate, morphine, naloxone, omeprazole, ondansetron, oxyCODONE-acetaminophen, predniSONE, sacubitril-valsartan, spironolactone, torsemide, and vitamin D.     Allergies: Atenolol    Objective:     Vitals:    22 0930   BP: (!) 102/50   Pulse: 75   Resp: 18   Temp: 98.2 °F (36.8 °C)   TempSrc: Oral   Weight: 163 lb 6.4 oz (74.1 kg)   Height: 5' 8\" (1.727 m)     AAOx3, fatigued, NAD  No cellulitis, angitis, fluctuance  No acute arthritis or bursitis  Greater LLE edema again now  No contact dermatitis or cutaneous Candidiasis  Blanca-ulcer skin: indurated, pink, warm, less hyperkeratosis at heel, mild maceration and more hyperkeratosis at sacrum  Ulcer(s):  sacrum is slowly a bit smaller, more pink and red, almost all granulation now, clean, some recurrent peripheral hypergranulation, no tunnels, mild undermining; heel ulcer tiny but stagnant, granular, but a bit of fibrin and biofilm, no signs of infection -- moving very slowly, even when compressed and offloaded, I think because of her DM and her RA meds. Photos also saved in electronic chart.     Today's wound measurements, per RN documentation:  Wound 12/20/21 #5, Left Heel, Pressure, Stage 3 (onset 12/10/2021)-Wound Length (cm): 0.9 cm  [REMOVED] Wound 05/13/22 #6, Left Great Toe, Diabetic, Solis 1, Onset 5/4/22-Wound Length (cm): 0 cm  Wound 12/18/20 #2, Sacrum, Pressure Injury, Stage 4, Onset 5/2020-Wound Length (cm): 1.2 cm    Wound 12/20/21 #5, Left Heel, Pressure, Stage 3 (onset 12/10/2021)-Wound Width (cm): 0.1 cm  [REMOVED] Wound 05/13/22 #6, Left Great Toe, Diabetic, Solis 1, Onset 5/4/22-Wound Width (cm): 0 cm  Wound 12/18/20 #2, Sacrum, Pressure Injury, Stage 4, Onset 5/2020-Wound Width (cm): 1 cm    Wound 12/20/21 #5, Left Heel, Pressure, Stage 3 (onset 12/10/2021)-Wound Depth (cm): 0.2 cm  [REMOVED] Wound 05/13/22 #6, Left Great Toe, Diabetic, Solis 1, Onset 5/4/22-Wound Depth (cm): 0 cm  Wound 12/18/20 #2, Sacrum, Pressure Injury, Stage 4, Onset 5/2020-Wound Depth (cm): 0.4 cm    Assessment:     Patient Active Problem List   Diagnosis Code    Rheumatoid arthritis ([facility] [address] M06.9    Psoriasis L40.9    GERD (gastroesophageal reflux disease) K21.9    Anemia D64.9    Cylindrical bronchiectasis (HCC) J47.9    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    Immunocompromised state ([facility] [address] D84.9    Coronary artery disease I25.10    Bilateral lower extremity edema R60.0    Essential hypertension I10    Lumbar spondylosis M47.816    Mitral valve insufficiency and aortic valve insufficiency I08.0    Mixed hyperlipidemia E78.2    Myopia of both eyes H52.13    Osteoporosis M81.0    Other chronic sinusitis J32.8    Primary open angle glaucoma (POAG) of both eyes, mild stage H40.1131    Primary osteoarthritis of right hip M16.11    Type 2 diabetes mellitus with unspecified diabetic retinopathy without macular edema (HCC) E11.319    Type 2 diabetes mellitus with diabetic peripheral angiopathy without gangrene, with long-term current use of insulin ([facility]) E11.51, Z79.4    Pressure ulcer of coccygeal region, stage 4 ([facility]) L89.154    Mild malnutrition (HCC) E44.1    Chronic diastolic congestive heart failure (HCC) I50.32    Chronic obstructive pulmonary disease (HCC) J44.9    Hypergranulation L92.9    Nonrheumatic mitral (valve) insufficiency I34.0    Chronic respiratory failure with hypoxia ([facility]) J96.11    Pressure ulcer of left heel, stage 3 (HCC) E49.878    Venous insufficiency of left lower extremity I87.2       Assessment of today's active condition(s): RA, decreased mobility, longstanding and slowly healing stage 4 sacral pressure ulcer, also more recent onset of a left heel pressure ulcer, very small, not yet healed, and edema not controlled at all this week. Factors contributing to occurrence and/or persistence of the chronic ulcer include edema, venous stasis, chronic pressure, decreased mobility, shear force and immunosuppression. Medical necessity of today's visit is shown by the above documentation. Sharp debridement is indicated today, based upon the exam findings in the wound(s) above. Procedure note:     Consent obtained. Time out performed per [facility]. Anesthetic  Anesthetic: 4% Lidocaine Cream     Using a curette, I sharply debrided the foot (left , heel) ulcer(s) down through and including the removal of dermis. The type(s) of tissue debrided included fibrin and biofilm. Total Surface Area Debrided: 1 sq cm. The ulcers were then irrigated with normal saline solution. The procedure was completed with a small amount of bleeding, and hemostasis was with pressure. The patient tolerated the procedure well, with no significant complications. The patient's level of pain during and after the procedure was monitored.  Post-debridement measurements, if different from pre-debridement, are in the flowsheet as well.  ______________    To encourage better epithelial cell coverage, I did use AgNO3 to chemically cauterize hypergranulation tissue on the sacrum ulcer(s), after application of 4% lidocaine topical solution. This was tolerated well, with no pain or skin injury. Discharge plan:     Treatment in the wound care center today, per RN documentation: Wound 12/20/21 #5, Left Heel, Pressure, Stage 3 (onset 12/10/2021)-Dressing/Treatment:  (collagen AG,Foam,compri 2 lyte,spandigrip F)  Wound 12/18/20 #2, Sacrum, Pressure Injury, Stage 4, Onset 5/2020-Dressing/Treatment:  (Collagen AG, gauze, mepilex border). Per physician order, left application of multilayer compression wrap was performed in the wound care center today, to help manage edema, stasis dermatitis, and/or venous ulcers. Leave primary dressing and multi-layer wrap(s) in place until the next appointment. Also discussed ways to keep the wrap dry for a shower, including a plastic cast-guard, available in retail pharmacies. She should call before her next visit if there is any significant pain, significant strike-through of drainage to the surface of the wrap, or any significant sense of the wrap sliding down more than an inch or so, bunching-up and abrading her skin. I reminded the patient of the importance of weight management and smoking cessation, if applicable; also encouraged ambulation as tolerated, additional lower extremity exercises as instructed in our education sheet, leg elevation when at rest, and compliance with any recommended dietary, diuretic and compression therapies. Gave her a paper today to help her buy a more appropriate pair of compression socks for going forward; just 15-20 mmHg, nothing too aggressive; I believe available at her local [facility] (or online from them, at worst). Home treatment: good handwashing before and after any dressing changes. Cleanse wound with saline or soap & water before dressing change.  May use Vaseline (petrolatum), Aquaphor, Aveeno, CeraVe, Cetaphil, Eucerin, Lubriderm, etc for dry skin. Dressing type for home: Vashe, ZnO, Multidex, collagen, foam to the sacrum, every other day. Written discharge instructions given to patient. Follow up in 1 week.     Electronically signed by Kassi Lancaster MD on 5/23/2022 at 2:38 PM.

## 2022-05-24 ENCOUNTER — TELEPHONE (OUTPATIENT)
Dept: CARDIOLOGY CLINIC | Age: 72
End: 2022-05-24

## 2022-05-24 NOTE — TELEPHONE ENCOUNTER
Take an extra torsemide daily until weight <162 lbs.  Low salt diet, fluid restrictions, elevate legs

## 2022-05-24 NOTE — TELEPHONE ENCOUNTER
Two things: weight gain & swellin. Pt called stating she is gaining more weight than normal. She stated this was discussed at her last ov. Her weight from 159, yesterday it was 167 and today it is 168lbs. Pt is wanting to know if her water pill should be increased or try a different water pill. 2.She almost mentioned swelling about her left leg  Left leg is swollen more than right leg. Wound on heal on left foot, wound doctor taking care of this and wrapped her leg & it's still swollen. Denies sob. Pt can be reached at [phone].

## 2022-05-27 ENCOUNTER — HOSPITAL ENCOUNTER (OUTPATIENT)
Dept: WOUND CARE | Age: 72
Discharge: HOME | End: 2022-05-27
Payer: MEDICARE

## 2022-05-27 VITALS
RESPIRATION RATE: 20 BRPM | TEMPERATURE: 97.7 F | DIASTOLIC BLOOD PRESSURE: 60 MMHG | HEIGHT: 68 IN | HEART RATE: 84 BPM | BODY MASS INDEX: 25.23 KG/M2 | WEIGHT: 166.5 LBS | SYSTOLIC BLOOD PRESSURE: 121 MMHG

## 2022-05-27 DIAGNOSIS — I87.2 VENOUS INSUFFICIENCY OF LEFT LOWER EXTREMITY: Primary | ICD-10-CM

## 2022-05-27 DIAGNOSIS — L89.623 PRESSURE ULCER OF LEFT HEEL, STAGE 3 (HCC): ICD-10-CM

## 2022-05-27 DIAGNOSIS — L92.9 HYPERGRANULATION: ICD-10-CM

## 2022-05-27 DIAGNOSIS — R60.0 BILATERAL LOWER EXTREMITY EDEMA: ICD-10-CM

## 2022-05-27 DIAGNOSIS — L89.154 PRESSURE ULCER OF COCCYGEAL REGION, STAGE 4 (HCC): ICD-10-CM

## 2022-05-27 PROCEDURE — 97597 DBRDMT OPN WND 1ST 20 CM/<: CPT | Performed by: INTERNAL MEDICINE

## 2022-05-27 PROCEDURE — 29581 APPL MULTLAYER CMPRN SYS LEG: CPT | Performed by: INTERNAL MEDICINE

## 2022-05-27 PROCEDURE — 97597 DBRDMT OPN WND 1ST 20 CM/<: CPT

## 2022-05-27 PROCEDURE — 17250 CHEM CAUT OF GRANLTJ TISSUE: CPT

## 2022-05-27 PROCEDURE — 29581 APPL MULTLAYER CMPRN SYS LEG: CPT

## 2022-05-27 RX ORDER — LIDOCAINE HYDROCHLORIDE 40 MG/ML
SOLUTION TOPICAL ONCE
Status: CANCELLED | OUTPATIENT
Start: 2022-05-27 | End: 2022-05-27

## 2022-05-27 RX ORDER — LIDOCAINE 40 MG/G
CREAM TOPICAL ONCE
Status: CANCELLED | OUTPATIENT
Start: 2022-05-27 | End: 2022-05-27

## 2022-05-27 RX ORDER — LIDOCAINE 40 MG/G
CREAM TOPICAL ONCE
Status: DISCONTINUED | OUTPATIENT
Start: 2022-05-27 | End: 2022-05-28 | Stop reason: HOSPADM

## 2022-05-27 RX ORDER — LIDOCAINE 50 MG/G
OINTMENT TOPICAL ONCE
Status: CANCELLED | OUTPATIENT
Start: 2022-05-27 | End: 2022-05-27

## 2022-05-27 RX ORDER — BACITRACIN ZINC AND POLYMYXIN B SULFATE 500; 1000 [USP'U]/G; [USP'U]/G
OINTMENT TOPICAL ONCE
Status: CANCELLED | OUTPATIENT
Start: 2022-05-27 | End: 2022-05-27

## 2022-05-27 NOTE — PLAN OF CARE
[facility] Physician Orders and Discharge [facility]  [address], [address]  [place], [facility]  Telephone: [phone]      Fax: [phone]        Your home care Madiha Escobedo  Nurse Cherry Drafts [phone]      Your wound-care supplies will be provided by: [employer] orders supplies through [employer]. Please note, depending on your insurance coverage, you may have out-of-pocket expenses for these supplies. Someone from [place] may call you to confirm your order and discuss those potential costs before they ship your products -- please anticipate that call. If your out-of-pocket cost is going to be less than $200, and [employer] cannot reach you, they may ship your supplies as soon as the order is processed, and will send you a bill. If your out-of-pocket cost is going to be more than $200, [employer] should not ship your supplies until they speak with you. If you have any questions about your supplies or your potential out-of-pocket costs, or if you need to place an order for a refill of supplies (typically monthly), Kay Euceda is your local representative, and can be reached at [phone]. Information on [employer]'s return policy will also be enclosed with your supplies -- please read that carefully before opening your items.      NAME:  Gris Taveras   DATE of BIRTH:  1950  PRIMARY DIAGNOSIS FOR WOUND CARE CENTER:  Pressure ulcer     Wound cleansing:   Do not scrub or use excessive force. Wash hands with soap and water before and after dressing changes. Prior to applying a clean dressing, cleanse wound with normal saline, wound cleanser, or mild soap and water.  Ask your physician or nurse before getting the wound(s) wet in the shower.                Wound care for home:     Right Lower Leg:    Apply medium compression Spandagrip (give extra today)     Left Medial Heel/Leg:  Vashe  , Purachol Ag  , Plain Foam  Compri 2 Lite  Keep clean dry and in place Eb Roberson     Your nurse  is Mikki Alexander, ABRAHAM      If we applied slip-resistant hospital socks today, be sure to remove them at least once a day to inspect your toes or feet, even if you're not changing the wraps or dressings underneath.  If you see anything concerning (redness, excess moisture, etc), please call and let us know right away.     Should you experience any significant changes in your wound(s) (including redness, increased warmth, increased pain, increased drainage, odor, or fever) or have questions about your wound care, please contact the [facility] at [phone] Monday-Thursday from 8:00 am - 4:30 pm, or Friday from 8:00 am - 2:30 pm.  If you need help with your wound outside these hours and cannot wait until we are again available, contact your home-care company (if applicable), your PCP, or go to the nearest emergency room

## 2022-05-27 NOTE — PLAN OF CARE
Pt seen in [address] [place] [address],[address]- Sacral and heel wounds unchanged - debride  scacral wound per Dr. Deep Morales- wound care as follows  Right Lower Leg:    Apply medium compression Spandagrip (give extra today)     Left Medial Heel/Leg:  Vashe  , Purachol Ag  , Plain Foam  Compri 2 Lite  Keep clean dry and in place for the week        Sacral Wound:   Vashe or Hypochlorous acid soaked gauze applied to wound for 2-3 minutes, no need to rinse  Triad or Zinc Oxide to maegan-wound  Multidex Powder then Collagen with silver to wound bed & tuck into depth of wound  Fluff gauze over wound  Cover with small sacral mepilex border  HHC to change dressing on Monday, Wednesday, Friday next week    Reviewed AVS - f/u in 1 week

## 2022-06-01 ENCOUNTER — OFFICE VISIT (OUTPATIENT)
Dept: PULMONOLOGY | Age: 72
End: 2022-06-01
Payer: MEDICARE

## 2022-06-01 VITALS
SYSTOLIC BLOOD PRESSURE: 118 MMHG | HEIGHT: 68 IN | OXYGEN SATURATION: 94 % | RESPIRATION RATE: 16 BRPM | DIASTOLIC BLOOD PRESSURE: 60 MMHG | HEART RATE: 89 BPM | WEIGHT: 163 LBS | BODY MASS INDEX: 24.71 KG/M2

## 2022-06-01 DIAGNOSIS — J44.1 COPD EXACERBATION (HCC): Primary | ICD-10-CM

## 2022-06-01 PROCEDURE — 99214 OFFICE O/P EST MOD 30 MIN: CPT | Performed by: INTERNAL MEDICINE

## 2022-06-01 PROCEDURE — 1123F ACP DISCUSS/DSCN MKR DOCD: CPT | Performed by: INTERNAL MEDICINE

## 2022-06-01 RX ORDER — CEFUROXIME AXETIL 500 MG/1
500 TABLET ORAL 2 TIMES DAILY
Qty: 14 TABLET | Refills: 0 | Status: SHIPPED | OUTPATIENT
Start: 2022-06-01 | End: 2022-06-08

## 2022-06-01 RX ORDER — PREDNISONE 10 MG/1
TABLET ORAL
Qty: 30 TABLET | Refills: 0 | Status: SHIPPED | OUTPATIENT
Start: 2022-06-01 | End: 2022-06-13

## 2022-06-01 NOTE — PROGRESS NOTES
PULMONARY, CRITICAL CARE AND SLEEP MEDICINE   CC: Cough    Interval History: was doing well, now with several day h/o worsening cough with shortness of breath     Previous history: 61 yo with 2 month h/o severe waxing/waning cough, tx'd with avelox w/o improvement & then changed to Zpac, some improvement; then treated with prednisone & Zpac & then clearly better, then returned & retreated with cough medicine & prednisone. No cough prior to 3 months ago except with sinus drainage. Cough has slowly been improving since last visit here. DATA:   Blood pressure 118/60, pulse 89, resp. rate 16, height 5' 8\" (1.727 m), weight 163 lb (73.9 kg), SpO2 94 %. 'on RA  Constitutional:  No acute distress. HENT:  Oropharynx is clear and moist.   Neck: No tracheal deviation present. Cardiovascular: Normal heart sounds. No lower extremity edema. Pulmonary/Chest: few wheezes. No rhonchi. No rales. No decreased breath sounds. No accessory muscle usage or stridor. Musculoskeletal: No cyanosis. No clubbing. Skin: Skin is warm and dry. Psychiatric: Normal mood and affect. Neurologic: speech fluent, alert and oriented, strength symmetric        DATA:   PFT 1/2012   FEV1 2.2 L 77% nl ratio TLC 84% DLCO 18.29 88%  PFT 4/22/2013 FEV1 2.26 L 70%  TLC 92% DLCO 19.08 69%  PFT Mar 2014  FEV1 2.08 L 65%  TLC 5.63 88% DLCO 20.05 73%  PFT June 2014 FEV1 2.12 L 69%  TLC 6.14 99% DLCO 18.31 68%  PFT April 2016 FEV1 2.24 L FVC 3.2 L TLC 6.36 l DLCO 19.16 72%    Pertussis antibodies are positive    CPAP titration 8/20/19 CPAP 7  PSG 7/17/19 AHI 8, but AHI 46 with REM    CABG 5/3/2021   Urgent coronary bypass grafting surgery x3 with single greater saphenous vein graft to the posterior ventricular branch of the right coronary artery, separate single greater saphenous vein graft to the second obtuse marginal branch of circumflex, pedicled left internal artery to the LAD.  Left atrial appendage obliteration    ABHISHEK 10/29/2021  LVEF 50-55%  Mild mitral regurgitation  Left atrium mildly dilated  Bubble study showed grade 1 pulmonary atrial venous malformation  Moderate layered plaque is noted in the ascending aorta & arch    CTPA 12/13/2021  Mediastinum: No evidence of mediastinal lymphadenopathy.  The heart and   pericardium demonstrate no acute abnormality.  There is no acute abnormality   of the thoracic aorta.       Lungs/pleura: The lungs are without acute process.  Linear parenchymal   scarring in pleural thickening seen at the right lung base.  No focal   consolidation or pulmonary edema.  No evidence of pleural effusion or   pneumothorax. Impression   No evidence of pulmonary embolism or acute pulmonary abnormality. Scarring at the right lung base     CXR 3/17/22 chronic R base changes    ASSESSMENT:   · COPD/Chronic bronchitis/cough with cylindrical bronchiectasis with acute exacerbation   · Centrilobular pulmonary emphysema  · H/O COVID 61/04/10 complicated by MRSA pneumonia   · Mild DANIELLE/REM related sleep disordered breathing - recently restarted CPAP with benefit   · CAD s/p CABG 5/3/21  · Tracheomalacia    · Lower extremity edema  · Pulmonary Nodules have resolved  · Rheumatoid arthritis on Embrel and MTX and 4 mg prednisone  · Positive tobacco history, 20 pack year quit in 1991  · Diabetes    PLAN:   · Prednisone taper and Ceftin   · Continue azithromycin daily  · Continue Daliresp; off Singulair  -- Failed Singulair, Failed Dulera, Failed Spiriva, Failed Advair.     · I recommended regular use of CPAP.     · F/U will be with me in 4-6 months

## 2022-06-03 ENCOUNTER — HOSPITAL ENCOUNTER (OUTPATIENT)
Dept: WOUND CARE | Age: 72
Discharge: HOME | End: 2022-06-03
Payer: MEDICARE

## 2022-06-03 VITALS
HEIGHT: 68 IN | WEIGHT: 162 LBS | HEART RATE: 77 BPM | SYSTOLIC BLOOD PRESSURE: 130 MMHG | TEMPERATURE: 97.3 F | BODY MASS INDEX: 24.55 KG/M2 | DIASTOLIC BLOOD PRESSURE: 59 MMHG | RESPIRATION RATE: 20 BRPM

## 2022-06-03 DIAGNOSIS — L89.154 PRESSURE ULCER OF COCCYGEAL REGION, STAGE 4 (HCC): ICD-10-CM

## 2022-06-03 DIAGNOSIS — I87.2 VENOUS INSUFFICIENCY OF LEFT LOWER EXTREMITY: Primary | ICD-10-CM

## 2022-06-03 DIAGNOSIS — L89.623 PRESSURE ULCER OF LEFT HEEL, STAGE 3 (HCC): ICD-10-CM

## 2022-06-03 DIAGNOSIS — R60.0 BILATERAL LOWER EXTREMITY EDEMA: ICD-10-CM

## 2022-06-03 DIAGNOSIS — L92.9 HYPERGRANULATION: ICD-10-CM

## 2022-06-03 PROCEDURE — 97597 DBRDMT OPN WND 1ST 20 CM/<: CPT | Performed by: INTERNAL MEDICINE

## 2022-06-03 PROCEDURE — 97597 DBRDMT OPN WND 1ST 20 CM/<: CPT

## 2022-06-03 PROCEDURE — 17250 CHEM CAUT OF GRANLTJ TISSUE: CPT

## 2022-06-03 RX ORDER — LIDOCAINE HYDROCHLORIDE 40 MG/ML
SOLUTION TOPICAL ONCE
Status: CANCELLED | OUTPATIENT
Start: 2022-06-03 | End: 2022-06-03

## 2022-06-03 RX ORDER — LIDOCAINE 40 MG/G
CREAM TOPICAL ONCE
Status: CANCELLED | OUTPATIENT
Start: 2022-06-03 | End: 2022-06-03

## 2022-06-03 RX ORDER — BACITRACIN ZINC AND POLYMYXIN B SULFATE 500; 1000 [USP'U]/G; [USP'U]/G
OINTMENT TOPICAL ONCE
Status: CANCELLED | OUTPATIENT
Start: 2022-06-03 | End: 2022-06-03

## 2022-06-03 RX ORDER — LIDOCAINE 50 MG/G
OINTMENT TOPICAL ONCE
Status: CANCELLED | OUTPATIENT
Start: 2022-06-03 | End: 2022-06-03

## 2022-06-03 RX ORDER — LIDOCAINE 40 MG/G
CREAM TOPICAL ONCE
Status: DISCONTINUED | OUTPATIENT
Start: 2022-06-03 | End: 2022-06-04 | Stop reason: HOSPADM

## 2022-06-03 ASSESSMENT — PAIN SCALES - GENERAL: PAINLEVEL_OUTOF10: 0

## 2022-06-03 NOTE — PLAN OF CARE
PSO  Benzoin  BOrder  Compression stocking    Sacral Wound:   Vashe or Hypochlorous acid soaked gauze applied to wound for 2-3 minutes, no need to rinse  Triad or Zinc Oxide to maegan-wound  Multidex Powder then Collagen with silver to wound bed & tuck into depth of wound  Fluff gauze over wound  Cover with small sacral mepilex border  HHC to change dressing on Monday, Wednesday, Friday next week        Please note, all wounds (unless stated otherwise here) were mechanically debrided at the time of cleansing here in the wound-care center today, so a small amount of pain, drainage or bleeding from that process might be expected, and is normal.      All products for home use, including multiple products for a single wound if applicable, are medically necessary in order to achieve the best chance at timely wound healing. See provider documentation for details if needed. Substituted dressings applied in the [facility] today, if applicable:          New orders for this week (labs, imaging, medications, etc.):     Discussed donning compression stocking- different appliances    Additional instructions for specific diagnoses:     +ROHO cushion- use at [address] when sitting!!  +True Balance Air group II mattress      General comments for pressure ulcers: *  Make sure you stay well-hydrated, and maintain good protein intake. *  Reposition at least every two hours, to keep from having pressure in one spot for too long. *  If you are not sure whether you have the best offloading surfaces (mattress, mattress overlay, chair cushion, heel-protector boots, etc), please ask. *  Moisturize your skin regularly with Vaseline, Aquaphor, Aveeno, CeraVe, Cetaphil, Eucerin, Lubriderm, etc; but keep the skin between your toes dry. *  If you smoke, your wound can not heal properly -- please talk with us when you're ready to quit.          F/U Appointment is with Dr. Marivel Tanner in 2 week on                                                at .     Your nurse  is Armida Vu RN      If we applied slip-resistant hospital socks today, be sure to remove them at least once a day to inspect your toes or feet, even if you're not changing the wraps or dressings underneath. If you see anything concerning (redness, excess moisture, etc), please call and let us know right away.      Should you experience any significant changes in your wound(s) (including redness, increased warmth, increased pain, increased drainage, odor, or fever) or have questions about your wound care, please contact the [facility] at [phone] Monday-Thursday from 8:00 am - 4:30 pm, or Friday from 8:00 am - 2:30 pm.  If you need help with your wound outside these hours and cannot wait until we are again available, contact your home-care company (if applicable), your PCP, or go to the nearest emergency room

## 2022-06-03 NOTE — PLAN OF CARE
Pt seen in [facility] - Heel wound scabbed over - trauma wound to Right forearm from Fall and coccyx wound unchanged - debride/ cautery on coccyx per Dr. Jacque Carias- cont wound care as follows  Wound care for home:   Left Forearm skin tear  PSO  benzoin  mepilex border-Large  spandigrip to hold in place     Right Lower Leg:    Compression stocking     Left Medial Heel/Leg:   PSO  Benzoin  BOrder  Compression stocking     Sacral Wound:   Vashe or Hypochlorous acid soaked gauze applied to wound for 2-3 minutes, no need to rinse  Triad or Zinc Oxide to maegan-wound  Multidex Powder then Collagen with silver to wound bed & tuck into depth of wound  Fluff gauze over wound  Cover with small sacral mepilex border  HHC to change dressing on Monday, Wednesday, Friday next week    Reviewed AVS - f/u in 1 week

## 2022-06-06 ENCOUNTER — HOSPITAL ENCOUNTER (EMERGENCY)
Age: 72
Discharge: HOME | End: 2022-06-06
Attending: STUDENT IN AN ORGANIZED HEALTH CARE EDUCATION/TRAINING PROGRAM
Payer: MEDICARE

## 2022-06-06 ENCOUNTER — APPOINTMENT (OUTPATIENT)
Dept: GENERAL RADIOLOGY | Age: 72
End: 2022-06-06
Payer: MEDICARE

## 2022-06-06 VITALS
HEIGHT: 70 IN | HEART RATE: 77 BPM | RESPIRATION RATE: 17 BRPM | WEIGHT: 161 LBS | BODY MASS INDEX: 23.05 KG/M2 | TEMPERATURE: 98.2 F | SYSTOLIC BLOOD PRESSURE: 100 MMHG | OXYGEN SATURATION: 98 % | DIASTOLIC BLOOD PRESSURE: 59 MMHG

## 2022-06-06 DIAGNOSIS — R05.9 COUGH: Primary | ICD-10-CM

## 2022-06-06 LAB
A/G RATIO: 1 (ref 1.1–2.2)
ALBUMIN SERPL-MCNC: 3.8 G/DL (ref 3.4–5)
ALP BLD-CCNC: 72 U/L (ref 40–129)
ALT SERPL-CCNC: 20 U/L (ref 10–40)
ANION GAP SERPL CALCULATED.3IONS-SCNC: 11 MMOL/L (ref 3–16)
AST SERPL-CCNC: 22 U/L (ref 15–37)
BASE EXCESS VENOUS: -0.6 MMOL/L (ref -3–3)
BASOPHILS ABSOLUTE: 0 K/UL (ref 0–0.2)
BASOPHILS RELATIVE PERCENT: 0.5 %
BILIRUB SERPL-MCNC: 0.4 MG/DL (ref 0–1)
BUN BLDV-MCNC: 36 MG/DL (ref 7–20)
CALCIUM SERPL-MCNC: 9.1 MG/DL (ref 8.3–10.6)
CARBOXYHEMOGLOBIN: 1.8 % (ref 0–1.5)
CHLORIDE BLD-SCNC: 98 MMOL/L (ref 99–110)
CO2: 26 MMOL/L (ref 21–32)
CREAT SERPL-MCNC: 1.3 MG/DL (ref 0.6–1.2)
EKG ATRIAL RATE: 80 BPM
EKG DIAGNOSIS: NORMAL
EKG P AXIS: 70 DEGREES
EKG P-R INTERVAL: 148 MS
EKG Q-T INTERVAL: 392 MS
EKG QRS DURATION: 106 MS
EKG QTC CALCULATION (BAZETT): 452 MS
EKG R AXIS: -8 DEGREES
EKG T AXIS: 68 DEGREES
EKG VENTRICULAR RATE: 80 BPM
EOSINOPHILS ABSOLUTE: 0 K/UL (ref 0–0.6)
EOSINOPHILS RELATIVE PERCENT: 0.1 %
GFR AFRICAN AMERICAN: 49
GFR NON-AFRICAN AMERICAN: 40
GLUCOSE BLD-MCNC: 82 MG/DL (ref 70–99)
HCO3 VENOUS: 25.2 MMOL/L (ref 23–29)
HCT VFR BLD CALC: 33.8 % (ref 36–48)
HEMOGLOBIN: 11.1 G/DL (ref 12–16)
INFLUENZA A: NOT DETECTED
INFLUENZA B: NOT DETECTED
LACTIC ACID, SEPSIS: <0.2 MMOL/L (ref 0.4–1.9)
LYMPHOCYTES ABSOLUTE: 0.6 K/UL (ref 1–5.1)
LYMPHOCYTES RELATIVE PERCENT: 11.6 %
MCH RBC QN AUTO: 28.8 PG (ref 26–34)
MCHC RBC AUTO-ENTMCNC: 33 G/DL (ref 31–36)
MCV RBC AUTO: 87.4 FL (ref 80–100)
METHEMOGLOBIN VENOUS: 0.3 %
MONOCYTES ABSOLUTE: 0.2 K/UL (ref 0–1.3)
MONOCYTES RELATIVE PERCENT: 4.1 %
NEUTROPHILS ABSOLUTE: 4.7 K/UL (ref 1.7–7.7)
NEUTROPHILS RELATIVE PERCENT: 83.7 %
O2 SAT, VEN: 65 %
O2 THERAPY: ABNORMAL
PCO2, VEN: 46.1 MMHG (ref 40–50)
PDW BLD-RTO: 16 % (ref 12.4–15.4)
PH VENOUS: 7.36 (ref 7.35–7.45)
PLATELET # BLD: 284 K/UL (ref 135–450)
PMV BLD AUTO: 7.2 FL (ref 5–10.5)
PO2, VEN: 35.3 MMHG (ref 25–40)
POTASSIUM REFLEX MAGNESIUM: 4.3 MMOL/L (ref 3.5–5.1)
PRO-BNP: 2255 PG/ML (ref 0–124)
RBC # BLD: 3.86 M/UL (ref 4–5.2)
SARS-COV-2 RNA, RT PCR: NOT DETECTED
SODIUM BLD-SCNC: 135 MMOL/L (ref 136–145)
TCO2 CALC VENOUS: 27 MMOL/L
TOTAL PROTEIN: 7.7 G/DL (ref 6.4–8.2)
TROPONIN: <0.01 NG/ML
WBC # BLD: 5.6 K/UL (ref 4–11)

## 2022-06-06 PROCEDURE — 6370000000 HC RX 637 (ALT 250 FOR IP): Performed by: STUDENT IN AN ORGANIZED HEALTH CARE EDUCATION/TRAINING PROGRAM

## 2022-06-06 PROCEDURE — 83605 ASSAY OF LACTIC ACID: CPT

## 2022-06-06 PROCEDURE — 99285 EMERGENCY DEPT VISIT HI MDM: CPT

## 2022-06-06 PROCEDURE — 71046 X-RAY EXAM CHEST 2 VIEWS: CPT

## 2022-06-06 PROCEDURE — 84484 ASSAY OF TROPONIN QUANT: CPT

## 2022-06-06 PROCEDURE — 93010 ELECTROCARDIOGRAM REPORT: CPT | Performed by: INTERNAL MEDICINE

## 2022-06-06 PROCEDURE — 87636 SARSCOV2 & INF A&B AMP PRB: CPT

## 2022-06-06 PROCEDURE — 93005 ELECTROCARDIOGRAM TRACING: CPT | Performed by: STUDENT IN AN ORGANIZED HEALTH CARE EDUCATION/TRAINING PROGRAM

## 2022-06-06 PROCEDURE — 85025 COMPLETE CBC W/AUTO DIFF WBC: CPT

## 2022-06-06 PROCEDURE — 82803 BLOOD GASES ANY COMBINATION: CPT

## 2022-06-06 PROCEDURE — 80053 COMPREHEN METABOLIC PANEL: CPT

## 2022-06-06 PROCEDURE — 36415 COLL VENOUS BLD VENIPUNCTURE: CPT

## 2022-06-06 PROCEDURE — 87040 BLOOD CULTURE FOR BACTERIA: CPT

## 2022-06-06 PROCEDURE — 83880 ASSAY OF NATRIURETIC PEPTIDE: CPT

## 2022-06-06 RX ORDER — IPRATROPIUM BROMIDE AND ALBUTEROL SULFATE 2.5; .5 MG/3ML; MG/3ML
1 SOLUTION RESPIRATORY (INHALATION) ONCE
Status: COMPLETED | OUTPATIENT
Start: 2022-06-06 | End: 2022-06-06

## 2022-06-06 RX ADMIN — IPRATROPIUM BROMIDE AND ALBUTEROL SULFATE 1 AMPULE: .5; 3 SOLUTION RESPIRATORY (INHALATION) at 18:33

## 2022-06-06 ASSESSMENT — PAIN SCALES - GENERAL: PAINLEVEL_OUTOF10: 7

## 2022-06-06 ASSESSMENT — PAIN DESCRIPTION - FREQUENCY: FREQUENCY: CONTINUOUS

## 2022-06-06 ASSESSMENT — PAIN DESCRIPTION - PAIN TYPE: TYPE: CHRONIC PAIN

## 2022-06-06 ASSESSMENT — PAIN DESCRIPTION - LOCATION: LOCATION: BACK

## 2022-06-06 ASSESSMENT — PAIN - FUNCTIONAL ASSESSMENT: PAIN_FUNCTIONAL_ASSESSMENT: 0-10

## 2022-06-06 ASSESSMENT — PAIN DESCRIPTION - DESCRIPTORS: DESCRIPTORS: ACHING

## 2022-06-06 NOTE — ED PROVIDER NOTES
[address] ED      CHIEF COMPLAINT  Shortness of Breath (home health nurse reports o2 sat 77% no home o2)     HISTORY OF PRESENT ILLNESS  Jay Miller is a 80 y.o. female  who presents to the ED complaining of cough, shortness of breath. Patient has a history of COPD. Recently saw her pulmonologist, Dr. Lyly Sheehan, was started on antibiotics and steroids. States that today, her home health nurse checked her oxygen saturation and found it to be 77% on room air. She does not normally wear oxygen at home. She denies any associated chest pain. She denies leg pain or leg swelling that is new. She denies any other complaints or concerns. Saturation at 98% on room air upon arrival in the ED. No other complaints, modifying factors or associated symptoms. I have reviewed the following from the nursing documentation.     Past Medical History:   Diagnosis Date    Acute on chronic diastolic heart failure due to coronary artery disease (HCC)     Acute respiratory failure with hypoxia (HCC)     Atherosclerosis of native artery of right lower extremity with rest pain ([address] 07/25/2017    Back pain     Branch retinal vein occlusion 07/20/2012    Bronchiectasis with acute exacerbation (HCC)     Cellulitis and abscess of left leg 7/11/2021    Cellulitis of left lower extremity 7/11/2021    S/P vein harvest 05/2021 for CABG    CHF (congestive heart failure) ([facility])     Closed compression fracture of thoracic vertebra ([address] 01/15/2020    Closed fracture of facial bone with routine healing 11/21/2016    Closed jaw fracture ([address] 01/15/2020    Community acquired pneumonia of left lower lobe of lung     Compression fracture of L1 lumbar vertebra ([address] 01/15/2020    COPD (chronic obstructive pulmonary disease) ([address]     COVID     Fracture of tibial plateau, closed, left, initial encounter 12/05/2017    HCAP (healthcare-associated pneumonia)     Minimally displaced zone I fracture of sacrum ([address] 09/02/2020    MRSA (methicillin resistant staph aureus) culture positive 07/2021    MRSA (methicillin resistant Staphylococcus aureus) 07/13/2021    wound    Mucus plugging of bronchi     NSTEMI (non-ST elevated myocardial infarction) ([address] 4/23/2021    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 Bone density test last year showed severe osteoporosis. Recently, two broken vertebrae (L1, L2) due to coughing. Diagnosed with tracheomalacia and stated she must cough very hard to clear phlegm. Was coughing due to upper respiratory infections which have been treated. Hx of laminectomy and recent kyphoplasty.    Refractured her jawbone which was previously repaired wi    Post herpetic neuralgia     Proximal humerus fracture 10/01/2019    Rheumatoid arthritis ([address]     Shingles 05/2020    Sleep apnea     Status post incision and drainage 07/2021    Left Leg    Surgical wound dehiscence, initial encounter (at [facility] harvest site) 7/12/2021    Temporal arteritis ([address] 07/10/2013    Tobacco use 10/19/2017    Tracheomalacia     Vitreous hemorrhage, right eye ([address] 02/21/2020     Past Surgical History:   Procedure Laterality Date    ARTERY BIOPSY Right 03/01/2021    at [address]  05/30/2013    left temporal artery biopsy    BACK SURGERY  08/2020    BRONCHOSCOPY      BRONCHOSCOPY N/A 06/12/2019    BRONCHOSCOPY ALVEOLAR LAVAGE performed by Carley Moreau MD at [address]  05/03/2021    CATARACT REMOVAL      COLONOSCOPY      CORONARY ARTERY BYPASS GRAFT N/A 05/03/2021    CORONARY ARTERY BYPASS X3 WITH LEFT ATRIAL APPENDAGE CLIP, 5 LEVEL BILATERAL INTERCOSTAL NERVE BLOCK, STERNAL PLATING performed by Darcy Ocampo MD at [address] [place] [address] CATH 2021    IR MIDLINE CATH 2021 [place] SPECIAL PROCEDURES    KNEE ARTHROSCOPY      left    KYPHOSIS SURGERY      LAMINECTOMY      LEG SURGERY Left 2021    INCISION AND DEBRIDEMENT LEFT LOWER LEG WOUND WITH POSSIBLE WOUND VAC PLACEMENT performed by Caroleen Halsted, MD at [facility]  2020    MEDIASTINOSCOPY N/A 2021    MEDIASTINAL EXPLORATION AND EVACUATION OF HEMATOMA performed by Natacha Daly MD at [address]  2021    sacral wound debridement    PRESSURE ULCER DEBRIDEMENT N/A 2021    SACRAL WOUND DEBRIDEMENT performed by Anisha Sandoval MD at [facility] SEPTOPLASTY  2013    FESS with balloon    SPINAL FUSION      TRANSESOPHAGEAL ECHOCARDIOGRAM  2021    TUBAL LIGATION      UPPER GASTROINTESTINAL ENDOSCOPY  2014    Dilitation     Family History   Problem Relation Age of Onset    Diabetes Mother     Hypertension Mother     Asthma Other     Heart Disease Brother     Diabetes Brother     Diabetes Sister     Heart Disease Brother     Cancer Neg Hx     Emphysema Neg Hx     Heart Failure Neg Hx      Social History     Socioeconomic History    Marital status:       Spouse name: Not on file    Number of children: Not on file    Years of education: Not on file    Highest education level: Not on file   Occupational History    Not on file   Tobacco Use    Smoking status: Former Smoker     Packs/day: 1.00     Years: 20.00     Pack years: 20.00     Types: Cigarettes     Quit date: 1991     Years since quittin.1    Smokeless tobacco: Never Used   Vaping Use    Vaping Use: Never used   Substance and Sexual Activity    Alcohol use: Not Currently     Alcohol/week: 0.0 standard drinks     Comment: rarely    Drug use: No    Sexual activity: Not on file   Other Topics Concern    Not on file   Social History Narrative    Not on file     Social Determinants of Health     Financial Resource Strain:     Difficulty of Paying Living Expenses: Not on file   Food Insecurity:     Worried About Running Out of Food in the Last Year: Not on file    Daina of Food in the Last Year: Not on file   Transportation Needs:     Lack of Transportation (Medical): Not on file    Lack of Transportation (Non-Medical): Not on file   Physical Activity:     Days of Exercise per Week: Not on file    Minutes of Exercise per Session: Not on file   Stress:     Feeling of Stress : Not on file   Social Connections:     Frequency of Communication with Friends and Family: Not on file    Frequency of Social Gatherings with Friends and Family: Not on file    Attends Restorationist Services: Not on file    Active Member of [address] [place] [address] [employer] or Organizations: Not on file    Attends Club or Organization Meetings: Not on file    Marital Status: Not on file   Intimate Partner Violence:     Fear of Current or Ex-Partner: Not on file    Emotionally Abused: Not on file    Physically Abused: Not on file    Sexually Abused: Not on file   Housing Stability:     Unable to Pay for Housing in the Last Year: Not on file    Number of [place] in the Last Year: Not on file    Unstable Housing in the Last Year: Not on file     No current facility-administered medications for this encounter. Current Outpatient Medications   Medication Sig Dispense Refill    predniSONE (DELTASONE) 10 MG tablet Take 4 tabs by mouth for three days, then 3 tabs by mouth for 3 days, then 2 tabs by mouth for 3 days, then 1 tab by mouth for 3 days.  30 tablet 0    cefUROXime (CEFTIN) 500 MG tablet Take 1 tablet by mouth 2 times daily for 7 days 14 tablet 0    cyclobenzaprine (FLEXERIL) 10 MG tablet TAKE ONE TABLET BY MOUTH TWICE DAILY AS NEEDED      Menthol, Topical Analgesic, 10 % LIQD Apply topically      DULoxetine (CYMBALTA) 60 MG extended release capsule       Etanercept (ENBREL SURECLICK) 50 MG/ML SOAJ Inject 1 Adjustable Dose Pre-filled Pen Syringe into the skin once a week      sacubitril-valsartan (ENTRESTO) 24-26 MG per tablet Take 1 tablet by mouth 2 times daily 180 tablet 3    torsemide (DEMADEX) 20 MG tablet Take 2 tablets by mouth daily If weight 131 lbs or less, decrease to 20 mg daily 180 tablet 3    metoprolol succinate (TOPROL XL) 25 MG extended release tablet Take 0.5 tablets by mouth daily 45 tablet 3    spironolactone (ALDACTONE) 25 MG tablet Take 1 tablet by mouth daily 90 tablet 3    clopidogrel (PLAVIX) 75 MG tablet Take 1 tablet by mouth daily 90 tablet 3    azithromycin (ZITHROMAX) 250 MG tablet Take 1 tablet by mouth daily 90 tablet 3    predniSONE (DELTASONE) 1 MG tablet Take 4 mg by mouth daily (Patient not taking: Reported on 6/3/2022)      ondansetron (ZOFRAN) 4 MG tablet every 8 hours as needed       oxyCODONE-acetaminophen (PERCOCET)  MG per tablet Take 1 tablet by mouth daily.  insulin glargine (LANTUS) 100 UNIT/ML injection vial Inject 30 Units into the skin nightly (Patient taking differently: Inject 15 Units into the skin 2 times daily ) 1 pen 0    DALIRESP 500 MCG tablet TAKE 1 TABLET BY MOUTH DAILY 30 tablet 11    docusate sodium (COLACE) 100 MG capsule Take 100 mg by mouth 2 times daily as needed for Constipation      gabapentin (NEURONTIN) 300 MG capsule Take 300 mg by mouth in the morning and at bedtime.  latanoprost (XALATAN) 0.005 % ophthalmic solution Place 1 drop into both eyes nightly      NARCAN 4 MG/0.1ML LIQD nasal spray as needed       morphine (MS CONTIN) 15 MG extended release tablet 15 mg 2 times daily.        ipratropium (ATROVENT) 0.06 % nasal spray USE 2 SPRAYS BY NASAL ROUTE 2-4 TIMES DAILY (Patient taking differently: 3 times daily as needed ) 1 Bottle 5    albuterol sulfate  (90 Base) MCG/ACT inhaler INHALE 2 PUFFS INTO THE LUNGS EVERY 4 HOURS AS NEEDED FOR WHEEZING 1 Inhaler 5    vitamin D (CHOLECALCIFEROL) 1000 UNIT TABS tablet Take 5,000 Units by mouth daily       calcium carbonate (OSCAL) 500 MG TABS tablet Take 500 mg by mouth daily      atorvastatin (LIPITOR) 40 MG tablet Take 40 mg by mouth      Insulin Syringe-Needle U-100 31G X 5/16\" 0.5 ML MISC USE 5 TIMES DAILY      ACCU-CHEK CEDRICK PLUS strip TEST 4 TIMES DAILY  3    insulin lispro (HUMALOG) 100 UNIT/ML injection vial Inject 0-12 Units into the skin 3 times daily (with meals)      Misc. Devices (ACAPELLA) MISC Take 1 Device by mouth as needed 1 each 0    fluticasone (FLONASE) 50 MCG/ACT nasal spray INHALE 2 SPRAYS IN EACH NOSTRIL DAILY 1 Bottle 5    cetirizine (ZYRTEC) 10 MG tablet Take 10 mg by mouth daily.  omeprazole (PRILOSEC) 40 MG capsule Take 40 mg by mouth daily        Allergies   Allergen Reactions    Atenolol Other (See Comments)     Cough         REVIEW OF SYSTEMS  10 systems reviewed, pertinent positives per HPI otherwise noted to be negative. PHYSICAL EXAM  BP (!) 100/59   Pulse 77   Temp 98.2 °F (36.8 °C) (Oral)   Resp 17   Ht 5' 10\" (1.778 m)   Wt 161 lb (73 kg)   SpO2 98%   BMI 23.10 kg/m²    GENERAL APPEARANCE: Awake and alert. Cooperative. No acute distress. HENT: Normocephalic. Atraumatic. NECK: Supple. EYES: PERRL. EOM's grossly intact. HEART/CHEST: RRR. No murmurs. LUNGS: Respirations unlabored. Mild expiratory wheezes bilaterally. Speaking comfortably in full sentences. ABDOMEN: No tenderness. Soft. Non-distended. No masses. No organomegaly. No guarding or rebound. MUSCULOSKELETAL: No extremity edema. Compartments soft. No deformity. No tenderness in the extremities. All extremities neurovascularly intact. SKIN: Warm and dry. No acute rashes. NEUROLOGICAL: Alert and oriented. CN's 2-12 intact. No gross facial drooping. Strength 5/5, sensation intact. Gait normal.  PSYCHIATRIC: Normal mood and affect. LABS  I have reviewed all labs for this visit.    Results for orders placed or performed during the hospital encounter of 06/06/22   COVID-19 & Influenza Combo    Specimen: Nasopharyngeal Swab   Result Value Ref Range    SARS-CoV-2 RNA, RT PCR NOT DETECTED NOT DETECTED    INFLUENZA A NOT DETECTED NOT DETECTED    INFLUENZA B NOT DETECTED NOT DETECTED   CBC with Auto Differential   Result Value Ref Range    WBC 5.6 4.0 - 11.0 K/uL    RBC 3.86 (L) 4.00 - 5.20 M/uL    Hemoglobin 11.1 (L) 12.0 - 16.0 g/dL    Hematocrit 33.8 (L) 36.0 - 48.0 %    MCV 87.4 80.0 - 100.0 fL    MCH 28.8 26.0 - 34.0 pg    MCHC 33.0 31.0 - 36.0 g/dL    RDW 16.0 (H) 12.4 - 15.4 %    Platelets 726 114 - 023 K/uL    MPV 7.2 5.0 - 10.5 fL    Neutrophils % 83.7 %    Lymphocytes % 11.6 %    Monocytes % 4.1 %    Eosinophils % 0.1 %    Basophils % 0.5 %    Neutrophils Absolute 4.7 1.7 - 7.7 K/uL    Lymphocytes Absolute 0.6 (L) 1.0 - 5.1 K/uL    Monocytes Absolute 0.2 0.0 - 1.3 K/uL    Eosinophils Absolute 0.0 0.0 - 0.6 K/uL    Basophils Absolute 0.0 0.0 - 0.2 K/uL   Comprehensive Metabolic Panel w/ Reflex to MG   Result Value Ref Range    Sodium 135 (L) 136 - 145 mmol/L    Potassium reflex Magnesium 4.3 3.5 - 5.1 mmol/L    Chloride 98 (L) 99 - 110 mmol/L    CO2 26 21 - 32 mmol/L    Anion Gap 11 3 - 16    Glucose 82 70 - 99 mg/dL    BUN 36 (H) 7 - 20 mg/dL    CREATININE 1.3 (H) 0.6 - 1.2 mg/dL    GFR Non- 40 (A) >60    GFR  49 (A) >60    Calcium 9.1 8.3 - 10.6 mg/dL    Total Protein 7.7 6.4 - 8.2 g/dL    Albumin 3.8 3.4 - 5.0 g/dL    Albumin/Globulin Ratio 1.0 (L) 1.1 - 2.2    Total Bilirubin 0.4 0.0 - 1.0 mg/dL    Alkaline Phosphatase 72 40 - 129 U/L    ALT 20 10 - 40 U/L    AST 22 15 - 37 U/L   Blood Gas, Venous   Result Value Ref Range    pH, Serafin 7.356 7.350 - 7.450    pCO2, Serafin 46.1 40.0 - 50.0 mmHg    pO2, Serafin 35.3 25.0 - 40.0 mmHg    HCO3, Venous 25.2 23.0 - 29.0 mmol/L    Base Excess, Serafin -0.6 -3.0 - 3.0 mmol/L    O2 Sat, Serafin 65 Not Established %    Carboxyhemoglobin 1.8 (H) 0.0 - 1.5 %    MetHgb, Serafin 0.3 <1.5 % TC02 (Calc), Serafin 27 Not Established mmol/L    O2 Therapy Unknown    Troponin   Result Value Ref Range    Troponin <0.01 <0.01 ng/mL   Brain Natriuretic Peptide   Result Value Ref Range    Pro-BNP 2,255 (H) 0 - 124 pg/mL   Lactate, Sepsis   Result Value Ref Range    Lactic Acid, Sepsis <0.2 (L) 0.4 - 1.9 mmol/L   EKG 12 Lead   Result Value Ref Range    Ventricular Rate 80 BPM    Atrial Rate 80 BPM    P-R Interval 148 ms    QRS Duration 106 ms    Q-T Interval 392 ms    QTc Calculation (Bazett) 452 ms    P Axis 70 degrees    R Axis -8 degrees    T Axis 68 degrees    Diagnosis       Normal sinus rhythmPossible Left atrial enlargementSeptal infarct , age undeterminedAbnormal ECGlossof R wave in V2. since previous EKG of 12.20. 21Confirmed by Taylor Gorman MD, [address] ([id number]) on 6/6/2022 8:28:27 PM       ECG  The Ekg interpreted by me shows  normal sinus rhythm with a rate of 80  Axis is   Normal  QTc is  within an acceptable range  Intervals and Durations are unremarkable. ST Segments: nonspecific changes  Nonspecific T wave abnormalities have improved slightly compared to previous performed 12/20/2021    RADIOLOGY  XR CHEST (2 VW)   Final Result   Chronic changes of COPD, congestion and bilateral lower lobe atelectatic   changes. No evidence of consolidative pneumonia. ED COURSE/MDM  Patient seen and evaluated. Old records reviewed. Labs and imaging reviewed and results discussed with patient. Patient is a very pleasant 27-year-old female, with history of COPD, currently taking steroids and antibiotics prescribed by her pulmonologist a few days ago. Presents with concerns for her hypoxia at home. Full HPI as detailed above. Upon arrival to ED, vitals reassuring. Patient is resting comfortably is no acute distress. Oxygen saturation is 98% on room air. Labs are performed and are otherwise reassuring. Creatinine is 1.3, it appears slightly higher than normal but not constituting an acute kidney injury. Chest x-ray shows chronic changes of COPD congestion bilateral lower lobe atelectasis but no evidence of consolidative pneumonia. She has no leukocytosis and is already on antibiotics at home. COVID and flu are negative. VBG without evidence of acidosis or CO2 retention. Was treated with 1 DuoNeb. Able to ambulate in the department and maintained her oxygen saturation. Do not see any indication for hospitalization at this point in time as she is already on antibiotics with no evidence of hypoxic or hypercapnic respiratory failure no evidence of sepsis. Patient is comfortable agreed with plan of care will be discharged home. Advise follow-up with pulmonology and her PCP as needed. Is this patient to be included in the SEP-1 Core Measure? No   Exclusion criteria - the patient is NOT to be included for SEP-1 Core Measure due to: Infection is not suspected     During the patient's ED course, the patient was given:  Medications   ipratropium-albuterol (DUONEB) nebulizer solution 1 ampule (1 ampule Inhalation Given 6/6/22 1833)        CLINICAL IMPRESSION  1. Cough        Blood pressure (!) 100/59, pulse 77, temperature 98.2 °F (36.8 °C), temperature source Oral, resp. rate 17, height 5' 10\" (1.778 m), weight 161 lb (73 kg), SpO2 98 %. DISPOSITION  Julian Hanley was discharged to home in good condition. Patient was given scripts for the following medications. I counseled patient how to take these medications.    Discharge Medication List as of 6/6/2022 10:12 PM          Follow-up with:  Hemal Poe, [address] [place] [address]  [phone]    Schedule an appointment as soon as possible for a visit   As needed    [facility]  [address] [facility]  Go to   If symptoms worsen    Pawan Elliott MD  [address] [facility],[address]    Schedule an appointment as soon as possible for a visit         DISCLAIMER: This chart was created using Dragon dictation software. Efforts were made by me to ensure accuracy, however some errors may be present due to limitations of this technology and occasionally words are not transcribed correctly.        Rocío Light MD  06/06/22 2836

## 2022-06-07 NOTE — ED NOTES
Ambulated pt from room 17 to bathroom by lobby and back. Pt's o2 remained at or above 90%. Upon returning to bed, pt's o2 was 100% with a good pleth.      Beth Zapata RN  06/06/22 2039

## 2022-06-07 NOTE — ED NOTES
Patient discharged in stable condition in wheelchair with all documented belongings accompanied by daughter. This nurse reviewed discharge instructions, pt verbalized understanding.        Jerome Rodas RN  06/06/22 1880

## 2022-06-08 ENCOUNTER — OFFICE VISIT (OUTPATIENT)
Dept: CARDIOLOGY CLINIC | Age: 72
End: 2022-06-08
Payer: MEDICARE

## 2022-06-08 VITALS
OXYGEN SATURATION: 90 % | SYSTOLIC BLOOD PRESSURE: 88 MMHG | BODY MASS INDEX: 23.19 KG/M2 | HEART RATE: 69 BPM | DIASTOLIC BLOOD PRESSURE: 42 MMHG | HEIGHT: 70 IN | WEIGHT: 162 LBS

## 2022-06-08 DIAGNOSIS — I25.10 CORONARY ARTERY DISEASE INVOLVING NATIVE CORONARY ARTERY OF NATIVE HEART WITHOUT ANGINA PECTORIS: ICD-10-CM

## 2022-06-08 DIAGNOSIS — I34.0 NONRHEUMATIC MITRAL VALVE REGURGITATION: ICD-10-CM

## 2022-06-08 DIAGNOSIS — I50.22 CHRONIC SYSTOLIC CHF (CONGESTIVE HEART FAILURE) (HCC): Primary | ICD-10-CM

## 2022-06-08 PROCEDURE — 1123F ACP DISCUSS/DSCN MKR DOCD: CPT | Performed by: NURSE PRACTITIONER

## 2022-06-08 PROCEDURE — 99214 OFFICE O/P EST MOD 30 MIN: CPT | Performed by: NURSE PRACTITIONER

## 2022-06-08 ASSESSMENT — ENCOUNTER SYMPTOMS: SHORTNESS OF BREATH: 1

## 2022-06-08 NOTE — PROGRESS NOTES
[facility] Progress Note    Julian Hanley     : 1950    DATE OF VISIT:  6/3/2022    Subjective:     Julian Hanley is a 80 y.o. female who has a pressure ulcer located on the sacrum. Significant symptoms or pertinent wound history since last visit: feeling ok overall, a bit more dyspnea and cough this week, but she saw pulmonary, was placed on some steroids and antibiotics for bronchiectasis exacerbation. She has her new stockings, doesn't know if she can easily manage them yet, until she gets some sort of donning aide. Additional ulcer(s) noted? no. That heel is actually healed this week, finally. Her current medication list consists of Acapella, DULoxetine, Etanercept, Insulin Syringe-Needle U-100, Menthol (Topical Analgesic), Roflumilast, albuterol sulfate HFA, atorvastatin, azithromycin, blood glucose test strips, calcium carbonate, cefUROXime, cetirizine, clopidogrel, cyclobenzaprine, docusate sodium, fluticasone, gabapentin, insulin glargine, insulin lispro, ipratropium, latanoprost, metoprolol succinate, morphine, naloxone, omeprazole, ondansetron, oxyCODONE-acetaminophen, predniSONE, sacubitril-valsartan, spironolactone, torsemide, and vitamin D.     Allergies: Atenolol    Objective:     Vitals:    22 1040 22 1043   BP:  (!) 130/59   Pulse:  77   Resp:  20   Temp:  97.3 °F (36.3 °C)   TempSrc:  Oral   Weight: 162 lb (73.5 kg)    Height: 5' 8\" (1.727 m)      AAOx3, fatigued, NAD  No cellulitis, angitis, fluctuance  No acute arthritis or bursitis  Mild-mod LLE edema now  No contact dermatitis or cutaneous Candidiasis  Blanca-ulcer skin: indurated, pink, warm, less hyperkeratosis at heel, mild maceration and some hyperkeratosis at sacrum  Ulcer(s):  sacrum is slowly a bit smaller, more pink and red, almost all granulation now, a bit of fibrin and biofilm, some recurrent peripheral hypergranulation, no tunnels, mild undermining; heel ulcer dry, and I do think delicately healed this week. Photos also saved in electronic chart.     Today's wound measurements, per RN documentation:  Wound 12/20/21 #5, Left Heel, Pressure, Stage 3 (onset 12/10/2021)-Wound Length (cm): 0 cm  Wound 12/18/20 #2, Sacrum, Pressure Injury, Stage 4, Onset 5/2020-Wound Length (cm): 0.9 cm    Wound 12/20/21 #5, Left Heel, Pressure, Stage 3 (onset 12/10/2021)-Wound Width (cm): 0 cm  Wound 12/18/20 #2, Sacrum, Pressure Injury, Stage 4, Onset 5/2020-Wound Width (cm): 1 cm    Wound 12/20/21 #5, Left Heel, Pressure, Stage 3 (onset 12/10/2021)-Wound Depth (cm): 0 cm  Wound 12/18/20 #2, Sacrum, Pressure Injury, Stage 4, Onset 5/2020-Wound Depth (cm): 0.4 cm    Assessment:     Patient Active Problem List   Diagnosis Code    Rheumatoid arthritis ([facility] M06.9    Psoriasis L40.9    GERD (gastroesophageal reflux disease) K21.9    Anemia D64.9    Cylindrical bronchiectasis ([facility]) J47.9    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    Immunocompromised state ([facility] D84.9    Coronary artery disease I25.10    Bilateral lower extremity edema R60.0    Essential hypertension I10    Lumbar spondylosis M47.816    Mitral valve insufficiency and aortic valve insufficiency I08.0    Mixed hyperlipidemia E78.2    Myopia of both eyes H52.13    Osteoporosis M81.0    Other chronic sinusitis J32.8    Primary open angle glaucoma (POAG) of both eyes, mild stage H40.1131    Primary osteoarthritis of right hip M16.11    Type 2 diabetes mellitus with unspecified diabetic retinopathy without macular edema ([facility]) E11.319    Type 2 diabetes mellitus with diabetic peripheral angiopathy without gangrene, with long-term current use of insulin ([facility]) E11.51, Z79.4    Pressure ulcer of coccygeal region, stage 4 ([facility]) L89.154    Mild malnutrition ([facility]) E44.1    Chronic diastolic congestive heart failure ([facility]) I50.32    Chronic obstructive pulmonary disease ([facility]) J44.9    Hypergranulation L92.9    Nonrheumatic mitral (valve) insufficiency I34.0    Chronic respiratory failure with hypoxia ([facility]) J96.11    Pressure ulcer of left heel, stage 3 ([facility]) U19.870    Venous insufficiency of left lower extremity I87.2       Assessment of today's active condition(s): RA, decreased mobility, slowly healing stage 4 sacral pressure ulcer, no signs of infection; also a healed heel pressure ulcer. Factors contributing to occurrence and/or persistence of the chronic ulcer include edema, venous stasis, diabetes, chronic pressure, decreased mobility, shear force and immunosuppression. Medical necessity of today's visit is shown by the above documentation. Sharp debridement is indicated today, based upon the exam findings in the wound(s) above. Procedure note:     Consent obtained. Time out performed per [facility]. Anesthetic  Anesthetic: 4% Lidocaine Cream     Using a curette, I sharply debrided the sacrum ulcer(s) down through and including the removal of dermis. The type(s) of tissue debrided included fibrin, biofilm and necrotic/eschar. Total Surface Area Debrided: 1 sq cm. The ulcers were then irrigated with normal saline solution. The procedure was completed with a small amount of bleeding, and hemostasis was with pressure. The patient tolerated the procedure well, with no significant complications. The patient's level of pain during and after the procedure was monitored. Post-debridement measurements, if different from pre-debridement, are in the flowsheet as well.  ______________    To encourage better epithelial cell coverage, I did use AgNO3 to chemically cauterize hypergranulation tissue on the sacrum ulcer(s), after application of 4% lidocaine topical solution. This was tolerated well, with no pain or skin injury.      Discharge plan:     Treatment in the wound care center today, per RN documentation: Wound 12/20/21 #5, Left Heel, Pressure, Stage 3 (onset 12/10/2021)-Dressing/Treatment: Other (comment) (PSO,Benzoin,Mepilex,CompressionStocking)  Wound 12/18/20 #2, Sacrum, Pressure Injury, Stage 4, Onset 5/2020-Dressing/Treatment: Other (comment) (ZincOxide,CollagenAg,Gauze,Mepilex). I showed her and her daughter a few options to make stockings easier for her at home (rubber gripped gloves, the [employer], a plastic donning frame, the [employer] device). They think they'll just start with the gloves, and see how it goes (should be available at local [employer], or online). I reminded the patient of the importance of weight management and smoking cessation, if applicable; also encouraged ambulation as tolerated, additional lower extremity exercises as instructed in our education sheet, leg elevation when at rest, and compliance with any recommended dietary, diuretic and compression therapies. Keep focused on protein intake and especially offloading. The left heel dressing can be removed in about a week. Home treatment: good handwashing before and after any dressing changes. Cleanse wound with saline or soap & water before dressing change. May use Vaseline (petrolatum), Aquaphor, Aveeno, CeraVe, Cetaphil, Eucerin, Lubriderm, etc for dry skin. Dressing type for home: for the sacrum, Vashe, ZnO, Multidex, Collagen, bordered foam, three times weekly. Written discharge instructions given to patient. Follow up in 2 weeks, but call sooner with concerns or questions.     Electronically signed by Wayne Hung MD on 6/7/2022 at 9:24 PM.

## 2022-06-08 NOTE — PROGRESS NOTES
[facility] Progress Note    Kristin Santo     : 1950    DATE OF VISIT:  2022    Subjective:     Kristin Santo is a 80 y.o. female who has a pressure ulcer located on the sacrum and foot (left , heel). Significant symptoms or pertinent wound history since last visit: feeling ok overall, no fever, no foot pain, mild sacral pain, not much drainage, stable swelling while wrapped for the week; should be getting new stockings (stronger) this week. Additional ulcer(s) noted? no.      Her current medication list consists of Acapella, DULoxetine, Etanercept, Insulin Syringe-Needle U-100, Menthol (Topical Analgesic), Roflumilast, albuterol sulfate HFA, atorvastatin, azithromycin, blood glucose test strips, calcium carbonate, cefUROXime, cetirizine, clopidogrel, cyclobenzaprine, docusate sodium, fluticasone, gabapentin, insulin glargine, insulin lispro, ipratropium, latanoprost, metoprolol succinate, morphine, naloxone, omeprazole, ondansetron, oxyCODONE-acetaminophen, predniSONE, sacubitril-valsartan, spironolactone, torsemide, and vitamin D. Allergies: Atenolol    Objective:     Vitals:    22 0914   BP: 121/60   Pulse: 84   Resp: 20   Temp: 97.7 °F (36.5 °C)   TempSrc: Oral   Weight: 166 lb 8 oz (75.5 kg)   Height: 5' 8\" (1.727 m)     AAOx3, fatigued, NAD  No cellulitis, angitis, fluctuance  No acute arthritis or bursitis  Mild-mod LLE edema now  No contact dermatitis or cutaneous Candidiasis  Blanca-ulcer skin: indurated, pink, warm, less hyperkeratosis at heel, mild maceration and some hyperkeratosis at sacrum  Ulcer(s):  sacrum is slowly a bit smaller, more pink and red, almost all granulation now, a bit of fibrin and biofilm, some recurrent peripheral hypergranulation, no tunnels, mild undermining; heel ulcer tiny but stagnant, granular, slightly steep but healthy edges, serous exudate. Photos also saved in electronic chart.     Today's wound measurements, per RN documentation:  Wound 12/20/21 #5, Left Heel, Pressure, Stage 3 (onset 12/10/2021)-Wound Length (cm): 1 cm  Wound 12/18/20 #2, Sacrum, Pressure Injury, Stage 4, Onset 5/2020-Wound Length (cm): 1 cm    Wound 12/20/21 #5, Left Heel, Pressure, Stage 3 (onset 12/10/2021)-Wound Width (cm): 0.2 cm  Wound 12/18/20 #2, Sacrum, Pressure Injury, Stage 4, Onset 5/2020-Wound Width (cm): 1 cm    Wound 12/20/21 #5, Left Heel, Pressure, Stage 3 (onset 12/10/2021)-Wound Depth (cm): 0.3 cm  Wound 12/18/20 #2, Sacrum, Pressure Injury, Stage 4, Onset 5/2020-Wound Depth (cm): 0.5 cm    Assessment:     Patient Active Problem List   Diagnosis Code    [facility] M06.9    Psoriasis L40.9    GERD (gastroesophageal reflux disease) K21.9    Anemia D64.9    Cylindrical bronchiectasis ([facility]) J47.9    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    Immunocompromised state ([facility] D84.9    Coronary artery disease I25.10    Bilateral lower extremity edema R60.0    Essential hypertension I10    Lumbar spondylosis M47.816    Mitral valve insufficiency and aortic valve insufficiency I08.0    Mixed hyperlipidemia E78.2    Myopia of both eyes H52.13    Osteoporosis M81.0    Other chronic sinusitis J32.8    Primary open angle glaucoma (POAG) of both eyes, mild stage H40.1131    Primary osteoarthritis of right hip M16.11    Type 2 diabetes mellitus with unspecified diabetic retinopathy without macular edema ([facility]) E11.319    Type 2 diabetes mellitus with diabetic peripheral angiopathy without gangrene, with long-term current use of insulin ([facility]) E11.51, Z79.4    Pressure ulcer of coccygeal region, stage 4 ([facility]) L89.154    Mild malnutrition ([facility]) E44.1    Chronic diastolic congestive heart failure ([facility]) I50.32    Chronic obstructive pulmonary disease ([facility]) J44.9    Hypergranulation L92.9    Nonrheumatic mitral (valve) insufficiency I34.0    Chronic respiratory failure with hypoxia ([facility]) J96.11    Pressure ulcer of left heel, stage 3 [facility]) M30.367    Venous insufficiency of left lower extremity I87.2       Assessment of today's active condition(s): RA, decreased mobility, slowly healing stage 4 sacral pressure ulcer; also a heel pressure ulcer that developed at a more recent hospital admission, almost healed, complicated by her venous edema after 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 Factors contributing to occurrence and/or persistence of the chronic ulcer include edema, venous stasis, diabetes, chronic pressure, decreased mobility, shear force and immunosuppression. Medical necessity of today's visit is shown by the above documentation. Sharp debridement is indicated today, based upon the exam findings in the wound(s) above. Procedure note:     Consent obtained. Time out performed per [facility]. Anesthetic  Anesthetic: 4% Lidocaine Cream     Using a curette, I sharply debrided the sacrum ulcer(s) down through and including the removal of dermis. The type(s) of tissue debrided included fibrin, biofilm and necrotic/eschar. Total Surface Area Debrided: 1 sq cm. The ulcers were then irrigated with normal saline solution. The procedure was completed with a small amount of bleeding, and hemostasis was with pressure. The patient tolerated the procedure well, with no significant complications. The patient's level of pain during and after the procedure was monitored. Post-debridement measurements, if different from pre-debridement, are in the flowsheet as well.  ___________    To encourage better epithelial cell coverage, I did use AgNO3 to chemically cauterize hypergranulation tissue on the sacrum ulcer(s), after application of 4% lidocaine topical solution. This was tolerated well, with no pain or skin injury.      Discharge plan:     Treatment in the wound care center today, per RN documentation: Wound 12/20/21 #5, Left Heel, Pressure, Stage 3 (onset 12/10/2021)-Dressing/Treatment:  (collagen AG,Foam,compri 2 lyte,spandigrip )  Wound 12/18/20 #2, Sacrum, Pressure Injury, Stage 4, Onset 5/2020-Dressing/Treatment: Other (comment),Zinc paste (collagen Ag,4x4,mepilex border ). Per physician order, left application of multilayer compression wrap was performed in the wound care center today, to help manage edema, stasis dermatitis, and/or venous ulcers. Leave primary dressing and multi-layer wrap(s) in place until the next appointment. Also discussed ways to keep the wrap dry for a shower, including a plastic cast-guard, available in retail pharmacies. She should call before her next visit if there is any significant pain, significant strike-through of drainage to the surface of the wrap, or any significant sense of the wrap sliding down more than an inch or so, bunching-up and abrading her skin. I reminded the patient of the importance of weight management and smoking cessation, if applicable; also encouraged ambulation as tolerated, additional lower extremity exercises as instructed in our education sheet, leg elevation when at rest, and compliance with any recommended dietary, diuretic and compression therapies. Next week, if the heel is healed, or very close, we can try a weekly dressing with her own compression stocking if she has it by then, or if the ulcer is still stagnant, I'll inject a bit of lido, and excise those edges. Home treatment: good handwashing before and after any dressing changes. Cleanse wound with saline or soap & water before dressing change. May use Vaseline (petrolatum), Aquaphor, Aveeno, CeraVe, Cetaphil, Eucerin, Lubriderm, etc for dry skin. Dressing type for home: for the sacrum, Vashe, periwound ZnO, Multidex, collagen, bordered foam, three times weekly. Written discharge instructions given to patient. Follow up in 1 week.     Electronically signed by Pascale Calhoun MD on 6/7/2022 at 9:17 PM.

## 2022-06-08 NOTE — PATIENT INSTRUCTIONS
Decrease torsemide back to 40 mg daily  Continue other medications  Check BP at home and call the office if consistently out of goal range  Limit salt and fluid restriction  Blood work when you come to wound clinic  Heart ultrasound in July  Follow up with Dr. Lynn George

## 2022-06-08 NOTE — PROGRESS NOTES
[facility]   Cardiology Note              Date:  June 8, 2022  Patientname: Consuelo Yin  YOB: 1950    Primary Care physician: Casey Beverly MD    HISTORY OF PRESENT ILLNESS: Consuelo Yin is a 80 y.o. female with a history of CAD, CHF, cardiomyopathy, MR, PVD, DM, DANIELLE, sacral ulcer, COPD, RA. She had right fem-pop bypass 9/2017. She was admitted 4/2021 for chest pain and troponin elevated. [facility]  4/26/2021 showed severe CAD. Echo showed EF 55%. On 5/3/2021 she had CABG x3, BIMAL clip. She required evacuation of hematoma 5/5/2021. She was admitted 7/2021 for cellulitis at vein harvest site requiring I&D on 7/13/2021. She was admitted 8/10/2021 for shortness of breath and treated for CHF. Echo showed EF 50%, possibly severe MR. 5 [facility] 8/16/2021 showed occluded SVG-OM, suspect severe MR due to papillary dysfunction r/t occluded RCA/LCx. ABHISHEK 10/2021 showed EF 30%, mild MR, +grade 1 pulmonary AV malformation. Echo 3/2022 showed EF 30-35%, moderate MR. At office visit 4/2022, entresto started. Today she presents for follow up for CHF. She is currently on antibiotics/steroids for COPD and has a cough/SOB with that. She has no chest pain. Edema and dizziness have improved. She has gained weight she states in part due to increased appetite. Home BP 110s/60s.     Office weight today 6/8/2022: 162 lbs (161 lbs home)  Office weight 4/19/2022: 156 lbs  Discharge weight 8/17/2021: 131 lbs    Past Medical History:   has a past medical history of Acute on chronic diastolic heart failure due to coronary artery disease ([address] Acute respiratory failure with hypoxia ([address] Atherosclerosis of native artery of right lower extremity with rest pain ([address] Back pain, Branch retinal vein occlusion, Bronchiectasis with acute exacerbation (HCC), Cellulitis and abscess of left leg, Cellulitis of left lower extremity, CHF (congestive heart failure) ([address] Closed compression fracture of thoracic vertebra ([facility] [address] Closed fracture of facial bone with routine healing, Closed jaw fracture ([address] Community acquired pneumonia of left lower lobe of lung, Compression fracture of L1 lumbar vertebra (HCC), COPD (chronic obstructive pulmonary disease) ([address] COVID, Fracture of tibial plateau, closed, left, initial encounter, HCAP (healthcare-associated pneumonia), Minimally displaced zone I fracture of sacrum ([address] MRSA (methicillin resistant staph aureus) culture positive, MRSA (methicillin resistant Staphylococcus aureus), Mucus plugging of bronchi, NSTEMI (non-ST elevated myocardial infarction) ([address] Osteomyelitis of mandible, Osteoporosis with pathological fracture, Post herpetic neuralgia, Proximal humerus fracture, Rheumatoid arthritis ([address] Shingles, Sleep apnea, Status post incision and drainage, Surgical wound dehiscence, initial encounter (at LLE SVG harvest site), Temporal arteritis ([address] Tobacco use, Tracheomalacia, and Vitreous hemorrhage, right eye ([address] Past Surgical History:   has a past surgical history that includes hernia repair; Mandible fracture surgery; Foot surgery; Elbow surgery; Cataract removal; Tubal ligation; Knee arthroscopy; Kyphosis surgery; laminectomy; Spinal fusion; Septoplasty (05/07/2013); Artery surgery (05/30/2013); Upper gastrointestinal endoscopy (04/08/2014); bronchoscopy; bronchoscopy (N/A, 06/12/2019); Mandible fracture surgery (02/2020); back surgery (08/2020); other surgical history (01/08/2021); Pressure ulcer debridement (N/A, 01/08/2021); Artery Biopsy (Right, 03/01/2021); Coronary artery bypass graft (N/A, 05/03/2021); Mediastinoscopy (N/A, 05/05/2021); Cardiac surgery (05/03/2021); Leg Surgery (Left, 7/13/2021); IR MIDLINE CATH (7/14/2021); transesophageal echocardiogram (11/02/2021); and Colonoscopy. Home Medications:    Prior to Admission medications    Medication Sig Start Date End Date Taking?  Authorizing Provider   predniSONE (DELTASONE) 10 MG tablet Take 4 tabs by mouth for three days, then 3 tabs by mouth for 3 days, then 2 tabs by mouth for 3 days, then 1 tab by mouth for 3 days. 6/1/22 6/13/22  Manish Gonzalez MD   cefUROXime (CEFTIN) 500 MG tablet Take 1 tablet by mouth 2 times daily for 7 days 6/1/22 6/8/22  Manish Gonzalez MD   cyclobenzaprine (FLEXERIL) 10 MG tablet TAKE ONE TABLET BY MOUTH TWICE DAILY AS NEEDED 4/26/22   Historical Provider, MD   Menthol, Topical Analgesic, 10 % LIQD Apply topically    Historical Provider, MD   DULoxetine (CYMBALTA) 60 MG extended release capsule  3/25/22   Historical Provider, MD   Etanercept (ENBREL SURECLICK) 50 MG/ML SOAJ Inject 1 Adjustable Dose Pre-filled Pen Syringe into the skin once a week    Historical Provider, MD   sacubitril-valsartan (ENTRESTO) 24-26 MG per tablet Take 1 tablet by mouth 2 times daily 4/19/22   Christian Mayfield MD   torsemide (DEMADEX) 20 MG tablet Take 2 tablets by mouth daily If weight 131 lbs or less, decrease to 20 mg daily 2/22/22   Christian Mayfield MD   metoprolol succinate (TOPROL XL) 25 MG extended release tablet Take 0.5 tablets by mouth daily 2/22/22   Christian Mayfield MD   spironolactone (ALDACTONE) 25 MG tablet Take 1 tablet by mouth daily 2/22/22   Christian Mayfield MD   clopidogrel (PLAVIX) 75 MG tablet Take 1 tablet by mouth daily 2/22/22   Christian Mayfield MD   azithromycin (ZITHROMAX) 250 MG tablet Take 1 tablet by mouth daily 2/7/22   Lina Batres PA-C   predniSONE (DELTASONE) 1 MG tablet Take 4 mg by mouth daily  Patient not taking: Reported on 6/3/2022    Historical Provider, MD   ondansetron (ZOFRAN) 4 MG tablet every 8 hours as needed  12/9/21   Historical Provider, MD   oxyCODONE-acetaminophen (PERCOCET)  MG per tablet Take 1 tablet by mouth daily.   1/11/22   Historical Provider, MD   insulin glargine (LANTUS) 100 UNIT/ML injection vial Inject 30 Units into the skin nightly  Patient taking differently: Inject 15 Units into the skin 2 times daily  12/28/21   Sanjuana Miguel Tirado MD   DALIRESP 500 MCG tablet TAKE 1 TABLET BY MOUTH DAILY 6/23/21   Abraham Maradiaga MD   docusate sodium (COLACE) 100 MG capsule Take 100 mg by mouth 2 times daily as needed for Constipation    Historical Provider, MD   gabapentin (NEURONTIN) 300 MG capsule Take 300 mg by mouth in the morning and at bedtime. Historical Provider, MD   latanoprost (XALATAN) 0.005 % ophthalmic solution Place 1 drop into both eyes nightly    Historical Provider, MD   NARCAN 4 MG/0.1ML LIQD nasal spray as needed  10/20/20   Historical Provider, MD   morphine (MS CONTIN) 15 MG extended release tablet 15 mg 2 times daily. 10/16/20   Historical Provider, MD   ipratropium (ATROVENT) 0.06 % nasal spray USE 2 SPRAYS BY NASAL ROUTE 2-4 TIMES DAILY  Patient taking differently: 3 times daily as needed  10/29/20   Abraham Maradiaga MD   albuterol sulfate  (90 Base) MCG/ACT inhaler INHALE 2 PUFFS INTO THE LUNGS EVERY 4 HOURS AS NEEDED FOR WHEEZING 1/20/20   Harvinder Fragoso MD   vitamin D (CHOLECALCIFEROL) 1000 UNIT TABS tablet Take 5,000 Units by mouth daily     Historical Provider, MD   calcium carbonate (OSCAL) 500 MG TABS tablet Take 500 mg by mouth daily    Historical Provider, MD   atorvastatin (LIPITOR) 40 MG tablet Take 40 mg by mouth 10/16/18   Historical Provider, MD   Insulin Syringe-Needle U-100 31G X 5/16\" 0.5 ML MISC USE 5 TIMES DAILY 5/30/18   Historical Provider, MD   ACCU-CHEK CEDRICK PLUS strip TEST 4 TIMES DAILY 6/1/18   Historical Provider, MD   insulin lispro (HUMALOG) 100 UNIT/ML injection vial Inject 0-12 Units into the skin 3 times daily (with meals) 10/23/17   Leslie Calle MD   Misc. Devices (ACAPELLA) MISC Take 1 Device by mouth as needed 9/2/15   LORENZO Monteiro - CNP   fluticasone [facility]) 50 MCG/ACT nasal spray INHALE 2 SPRAYS IN [facility] NOSTRIL DAILY 11/25/13   Abraham Maradiaga MD   cetirizine (ZYRTEC) 10 MG tablet Take 10 mg by mouth daily.       Historical Provider, MD   omeprazole (PRILOSEC) 40 MG capsule Take 40 mg by mouth daily     Historical Provider, MD     Allergies:  Atenolol    Social History:   reports that she quit smoking about 31 years ago. Her smoking use included cigarettes. She has a 20.00 pack-year smoking history. She has never used smokeless tobacco. She reports previous alcohol use. She reports that she does not use drugs. Family History: family history includes Asthma in an other family member; Diabetes in her brother, mother, and sister; Heart Disease in her brother and brother; Hypertension in her mother. Review of Systems   Review of Systems   Constitutional: Negative. Respiratory: Positive for shortness of breath. Cardiovascular: Positive for leg swelling. Negative for chest pain and palpitations. Neurological: Negative for dizziness. OBJECTIVE:    Vital signs:    BP (!) 88/42   Pulse 69   Ht 5' 9.5\" (1.765 m)   Wt 162 lb (73.5 kg)   SpO2 90%   BMI 23.58 kg/m²      Physical Exam:  Constitutional:  Comfortable and alert, NAD, appears older than stated age, frail  Eyes: PERRL, sclera nonicteric  Neck:  Supple, no masses, no thyroidmegaly, JVP <8  Skin:  Warm and dry; no rash or lesions; +sternal incision  Heart: Regular, normal apex, S1 and S2 normal, no M/G/R  Lungs:  Normal respiratory effort; clear; no wheezing/rhonchi/rales  Abdomen: soft, non tender, + bowel sounds  Extremities:  LLE wrapped, + edema, trace RLE edema  Neuro: alert and oriented, moves legs and arms equally, normal mood and affect    Data Reviewed:      Echo 3/2022:  Left ventricular systolic function is moderately reduced with ejection   fraction estimated at 30-35 %. Moderate global hypokinesis is present. Left ventricular size is severely increased. Heart is spherical.Wall   thickness is normal.   The left atrium is mildly dilated. The ascending aorta is mildly dilated at 3.6cm. Grade II diastolic dysfunction with elevated filling pressure. Moderate mitral regurgitation.    Mild aortic regurgitation is present. Moderate tricuspid regurgitation. Normal systolic pulmonary artery pressure (SPAP) estimated at 37 mmHg (RA   pressure 8 mmHg). Mild pulmonic regurgitation present. 9- Limited echo less than 30% moderate MR    ABHISHEK 10/2021:   -- Ejection fraction is visually estimated to be 30 %. -- Mild mitral regurgitation. -- The left atrium is mildly dilated. There is no evidence of mass or   thrombus in the left atrium or appendage. -- Bubble study was performed and showed grade I pulmonary arteriovenous   malformation ( < 30 bubbles in left ventricle after 3rd cardiac cycle). Moderate layered plaque is noted in the ascending aorta and arch. Echo 8/12/2021:  -Left ventricular cavity size is enlarged. LVESD=5.57 cm   -Overall left ventricular systolic function appears mildly reduced.   -Ejection fraction is visually estimated to be 50%. -There is mild hypokinesis of the basal to mid inferior and inferolateral   walls. -Grade II diastolic dysfunction with elevated LV filling pressures.   -The right ventricle is mildly enlarged.   -Right ventricular systolic function is normal.   -Mitral valve leaflets appear mildly thickened. -Mitral regurgitation that may be severe. -The aortic valve leaflets appear mildly thickened. -Mild aortic regurgitation.   -Moderate tricuspid regurgitation with a PASP of 57 mmHg.   -Trivial pulmonic regurgitation.     Coronary angiogram 8/16/2021:  Findings:  Artery Findings/Result   LM Normal   LAD Mid 100% after large diagonal, distal supplied by LIMA   Cx 99% ostial, distal supplied by R-L collaterals   RI N/A   RCA 70% distal, % prox   S-O occluded   S-PLB patent   L-LAD  patent   LVEDP 8   LVG NA due to contrast    Intervention:         None   Post Cath Dx:       Severe native disease as above  Occluded S-OM  Suspect severe MR related to papillary dysfunction related to occluded distal RCA and Cx  Consider MVR when wound issues resolve if MR does not improved with medical therapy  Would followup with Dr. Shelly Dominguez    CABG 5/3/2021:  Urgent CABG X 3, with pedicled LIMA to LAD, single Greater Saphenous VG to PV br of RCA, separate single Greater SVG to OM2,  LAAppendage obliteration with 40 mm Atricure LA Clip, CPB, EVH Left Greater Saphenous vein, ABHISHEK, Epiaortic ultrasound, Doppler verification of grafts, Bilateral 5 level intercostal nerve block(Exparel), Platelet gel application. Sternal plating. Echo 4/24/2021:  Normal left ventricle systolic function with an estimated ejection fraction   of 55%. No regional wall motion abnormalities are seen. Normal left ventricular diastolic filling pressure. Mild eccentric aortic regurgitation. Mild mitral and tricuspid regurgitation. Systolic pulmonary artery pressure (SPAP) is normal and estimated at 27 mmHg   (right atrial pressure 3 mmHg). Cardiology Labs Reviewed:   CBC:   Recent Labs     06/06/22 1845   WBC 5.6   HGB 11.1*   HCT 33.8*        BMP:  Recent Labs     06/06/22  1845   *   K 4.3   CO2 26   BUN 36*   CREATININE 1.3*   LABGLOM 40*   GLUCOSE 82     PT/INR: No results for input(s): PROTIME, INR in the last 72 hours. APTT:No results for input(s): APTT in the last 72 hours.   FASTING LIPID PANEL:  Lab Results   Component Value Date    HDL 34 10/29/2021    HDL 43 07/22/2010    LDLCALC 61 10/29/2021    TRIG 85 10/29/2021     LIVER PROFILE:  Recent Labs     06/06/22  1845   AST 22   ALT 20     BNP:   Lab Results   Component Value Date    PROBNP 2,255 06/06/2022    PROBNP 3,878 04/27/2022    PROBNP 3,215 03/17/2022    PROBNP 3,249 12/20/2021    PROBNP 5,973 12/13/2021     Reviewed all labs and imaging today    Assessment:   Chronic systolic CHF: appears compensated though does have weight gain  Cardiomyopathy, likely mixed ischemic and valvular: EF 30-35% on echo 4/2022; EF 55% prior to CABG 4/2021   - EF decreased with loss of SVG-OM and previous severe MR  Mitral regurgitation: moderate on echo 3/2022; mild on ABHISHEK 10/2021; severe on echo 8/2021; likely related to papillary dysfunction/occluded dRCA, LCx, SVG  CAD: no current angina; severe native disease and occluded SVG-OM on C 8/16/2021: s/p CABG x3 and BIMAL clip 5/3/2021  HTN: low BP noted  HLD: controlled, LDL 61, statin  DM: hgb a1c 7.7  DANIELLE  COPD  Tracheomalacia  Anemia  RA  LLE cellulitis: s/p I&D 7/13/2021 by Dr. Calixto  Chronic sacral decubitus ulcer: follows with wound clinic    Plan:   1. Decrease torsemide back to 40 mg daily; had been on 60 mg due to weight gain but renal function worse on recent check, BNP lower than previous, and CXR did not show edema; appears compensated on exam today  2. Continue entresto, toprol, spironolactone, statin, plavix; off aspirin due to bruising  3. BMP in 1 week  4. Unable to uptitrate GDMT due to low BP  5. Optimize GDMT and reassess EF in follow up, if EF remains <35%, consider ICD  6. Echo next month for EF  7.  Follow up with Dr. Evangelina Green as planned 8/2022    LORENZO Grace-CNP  [facility]  [phone]

## 2022-06-10 LAB
BLOOD CULTURE, ROUTINE: NORMAL
CULTURE, BLOOD 2: NORMAL

## 2022-06-15 ENCOUNTER — TELEPHONE (OUTPATIENT)
Dept: CARDIOLOGY CLINIC | Age: 72
End: 2022-06-15

## 2022-06-15 DIAGNOSIS — R60.9 EDEMA, UNSPECIFIED TYPE: ICD-10-CM

## 2022-06-15 DIAGNOSIS — I50.32 CHRONIC DIASTOLIC CONGESTIVE HEART FAILURE (HCC): ICD-10-CM

## 2022-06-15 NOTE — TELEPHONE ENCOUNTER
Pt calling stating she is having issue with BP for the last week being low. Averaging 80's/40's. Severe fatigue    Today she did get a reading of 118/63. She wanted you to know that she did start back on midodrine 5 mg on sat. And, she did cut back on the entresto Sunday. as instructed if issue with BP. Last ov 6.8. Pritesh [address]    Assessment:   Chronic systolic CHF: appears compensated though does have weight gain  Cardiomyopathy, likely mixed ischemic and valvular: EF 30-35% on echo 4/2022; EF 55% prior to CABG 4/2021              - EF decreased with loss of SVG-OM and previous severe MR  Mitral regurgitation: moderate on echo 3/2022; mild on ABHISHEK 10/2021; severe on echo 8/2021; likely related to papillary dysfunction/occluded dRCA, LCx, SVG  CAD: no current angina; severe native disease and occluded SVG-OM on [facility] 8/16/2021: s/p CABG x3 and BIMAL clip 5/3/2021  HTN: low BP noted  HLD: controlled, LDL 61, statin  DM: hgb a1c 7.7  DANIELLE  COPD  Tracheomalacia  Anemia  RA  LLE cellulitis: s/p I&D 7/13/2021 by Dr. Tariq Holden  Chronic sacral decubitus ulcer: follows with wound clinic     Plan:   1. Decrease torsemide back to 40 mg daily; had been on 60 mg due to weight gain but renal function worse on recent check, BNP lower than previous, and CXR did not show edema; appears compensated on exam today  2. Continue entresto, toprol, spironolactone, statin, plavix; off aspirin due to bruising  3. BMP in 1 week  4. Unable to uptitrate GDMT due to low BP  5. Optimize GDMT and reassess EF in follow up, if EF remains <35%, consider ICD  6. Echo next month for EF  7.  Follow up with Dr. Nichole Sorto as planned 8/2022     Sraah Johns, APRN-CNP  [address]  [phone]

## 2022-06-16 NOTE — TELEPHONE ENCOUNTER
Called and spoke to pt, relayed message, she VU and will contact office on Monday w/ some reading and any s/s

## 2022-06-17 ENCOUNTER — HOSPITAL ENCOUNTER (OUTPATIENT)
Dept: WOUND CARE | Age: 72
Discharge: HOME | End: 2022-06-17
Payer: MEDICARE

## 2022-06-17 VITALS
DIASTOLIC BLOOD PRESSURE: 71 MMHG | WEIGHT: 164 LBS | HEIGHT: 68 IN | TEMPERATURE: 98.4 F | RESPIRATION RATE: 20 BRPM | HEART RATE: 75 BPM | SYSTOLIC BLOOD PRESSURE: 152 MMHG | BODY MASS INDEX: 24.86 KG/M2

## 2022-06-17 DIAGNOSIS — L92.9 HYPERGRANULATION: ICD-10-CM

## 2022-06-17 DIAGNOSIS — R60.0 BILATERAL LOWER EXTREMITY EDEMA: ICD-10-CM

## 2022-06-17 DIAGNOSIS — L89.623 PRESSURE ULCER OF LEFT HEEL, STAGE 3 (HCC): ICD-10-CM

## 2022-06-17 DIAGNOSIS — L89.154 PRESSURE ULCER OF COCCYGEAL REGION, STAGE 4 (HCC): ICD-10-CM

## 2022-06-17 DIAGNOSIS — I87.2 VENOUS INSUFFICIENCY OF LEFT LOWER EXTREMITY: Primary | ICD-10-CM

## 2022-06-17 PROCEDURE — 97597 DBRDMT OPN WND 1ST 20 CM/<: CPT | Performed by: INTERNAL MEDICINE

## 2022-06-17 PROCEDURE — 97597 DBRDMT OPN WND 1ST 20 CM/<: CPT

## 2022-06-17 PROCEDURE — 17250 CHEM CAUT OF GRANLTJ TISSUE: CPT

## 2022-06-17 PROCEDURE — 97598 DBRDMT OPN WND ADDL 20CM/<: CPT

## 2022-06-17 RX ORDER — LIDOCAINE 50 MG/G
OINTMENT TOPICAL ONCE
Status: CANCELLED | OUTPATIENT
Start: 2022-06-17 | End: 2022-06-17

## 2022-06-17 RX ORDER — BACITRACIN ZINC AND POLYMYXIN B SULFATE 500; 1000 [USP'U]/G; [USP'U]/G
OINTMENT TOPICAL ONCE
Status: CANCELLED | OUTPATIENT
Start: 2022-06-17 | End: 2022-06-17

## 2022-06-17 RX ORDER — LIDOCAINE HYDROCHLORIDE 40 MG/ML
SOLUTION TOPICAL ONCE
Status: CANCELLED | OUTPATIENT
Start: 2022-06-17 | End: 2022-06-17

## 2022-06-17 RX ORDER — LIDOCAINE 40 MG/G
CREAM TOPICAL ONCE
Status: CANCELLED | OUTPATIENT
Start: 2022-06-17 | End: 2022-06-17

## 2022-06-17 RX ORDER — LIDOCAINE 40 MG/G
CREAM TOPICAL ONCE
Status: DISCONTINUED | OUTPATIENT
Start: 2022-06-17 | End: 2022-06-18 | Stop reason: HOSPADM

## 2022-06-17 ASSESSMENT — PAIN SCALES - GENERAL: PAINLEVEL_OUTOF10: 0

## 2022-06-17 NOTE — WOUND CARE
[facility] Physician Orders and Discharge [facility]  [address], [address]  [place], [facility]  Telephone: [phone]      Fax: [phone]        Your home care Madiha Escobedo  Nurse Conchis Calle [phone]      Your wound-care supplies will be provided by: [employer] orders supplies through [employer]. Please note, depending on your insurance coverage, you may have out-of-pocket expenses for these supplies. Someone from [facility] may call you to confirm your order and discuss those potential costs before they ship your products -- please anticipate that call. If your out-of-pocket cost is going to be less than $200, and [employer] cannot reach you, they may ship your supplies as soon as the order is processed, and will send you a bill. If your out-of-pocket cost is going to be more than $200, [employer] should not ship your supplies until they speak with you. If you have any questions about your supplies or your potential out-of-pocket costs, or if you need to place an order for a refill of supplies (typically monthly), Julisa Lr is your local representative, and can be reached at [phone]. Information on [employer]'s return policy will also be enclosed with your supplies -- please read that carefully before opening your items.      NAME:  Gris Taveras   DATE of BIRTH:  1950  PRIMARY DIAGNOSIS FOR WOUND CARE CENTER:  Pressure ulcer     Wound cleansing:   Do not scrub or use excessive force. Wash hands with soap and water before and after dressing changes. Prior to applying a clean dressing, cleanse wound with normal saline, wound cleanser, or mild soap and water.  Ask your physician or nurse before getting the wound(s) wet in the shower.                Wound care for home:      Right Lower Leg:    Compression stocking     Left Medial Heel/Leg: HEALED  Aquaphor   Compression stocking     Sacral Wound:   Vashe or Hypochlorous acid soaked gauze applied to wound for 2-3 minutes, no need to rinse  Triad or Zinc Oxide to maegan-wound  Multidex Powder then Collagen with silver to wound bed & tuck into depth of wound  Fluff gauze over wound  Cover with small sacral mepilex border  C to change dressing on Monday, Wednesday, Friday next week        Please note, all wounds (unless stated otherwise here) were mechanically debrided at the time of cleansing here in the wound-care center today, so a small amount of pain, drainage or bleeding from that process might be expected, and is normal.      All products for home use, including multiple products for a single wound if applicable, are medically necessary in order to achieve the best chance at timely wound healing. See provider documentation for details if needed.     Substituted dressings applied in the [address] [place] [address],[address] today, if applicable:           New orders for this week (labs, imaging, medications, etc.):          Additional instructions for specific diagnoses:     +ROHO cushion- use at [address] when sitting!!  +True Balance Air group II mattress      General comments for pressure ulcers:  *  Make sure you stay well-hydrated, and maintain good protein intake. *  Reposition at least every two hours, to keep from having pressure in one spot for too long. *  If you are not sure whether you have the best offloading surfaces (mattress, mattress overlay, chair cushion, heel-protector boots, etc), please ask. *  Moisturize your skin regularly with Vaseline, Aquaphor, Aveeno, CeraVe, Cetaphil, Eucerin, Lubriderm, etc; but keep the skin between your toes dry.   *  If you smoke, your wound can not heal properly -- please talk with us when you're ready to quit.         F/U Appointment is with Dr. Ann Marie Grover in 2 week on                                                at                       .     Your nurse  is Mikki Alexander RN      If we applied slip-resistant hospital socks today, be sure to remove them at least once a day to inspect your toes or feet, even if you're not changing the wraps or dressings underneath.  If you see anything concerning (redness, excess moisture, etc), please call and let us know right away.     Should you experience any significant changes in your wound(s) (including redness, increased warmth, increased pain, increased drainage, odor, or fever) or have questions about your wound care, please contact the [address] [place] [address] at [phone] Monday-Thursday from 8:00 am  4:30 pm, or Friday from 8:00 am - 2:30 pm.  If you need help with your wound outside these hours and cannot wait until we are again available, contact your home-care company (if applicable), your PCP, or go to the nearest emergency room

## 2022-06-17 NOTE — PROGRESS NOTES
215 [facility] Physician Orders and Discharge 800 [facility]  [address], [address]  [facility]  Telephone: [phone]      Fax: [phone]        Your home care Mad[address]  Nurse Ibrahima Barber [phone]      Your wound-care supplies will be provided by: [employer] orders supplies through [employer]. Please note, depending on your insurance coverage, you may have out-of-pocket expenses for these supplies. Someone from [employer] may call you to confirm your order and discuss those potential costs before they ship your products -- please anticipate that call. If your out-of-pocket cost is going to be less than $200, and [employer] cannot reach you, they may ship your supplies as soon as the order is processed, and will send you a bill. If your out-of-pocket cost is going to be more than $200, [employer] should not ship your supplies until they speak with you. If you have any questions about your supplies or your potential out-of-pocket costs, or if you need to place an order for a refill of supplies (typically monthly), Ned Yost is your local representative, and can be reached at [phone]. Information on [employer]'s return policy will also be enclosed with your supplies -- please read that carefully before opening your items.      NAME:  Gris Taveras   DATE of BIRTH:  1950  PRIMARY DIAGNOSIS FOR WOUND CARE CENTER:  Pressure ulcer     Wound cleansing:   Do not scrub or use excessive force. Wash hands with soap and water before and after dressing changes. Prior to applying a clean dressing, cleanse wound with normal saline, wound cleanser, or mild soap and water.  Ask your physician or nurse before getting the wound(s) wet in the shower.                Wound care for home:      Right Lower Leg:    Compression stocking     Left Medial Heel/Leg: HEALED  Aquaphor   Compression stocking     Sacral Wound:   Vashe or Hypochlorous acid soaked gauze applied to wound for 2-3 minutes, no need to rinse  Triad or Zinc Oxide to maegan-wound  Multidex Powder then Collagen with silver to wound bed & tuck into depth of wound  Fluff gauze over wound  Cover with small sacral mepilex border  C to change dressing on Monday, Wednesday, Friday next week        Please note, all wounds (unless stated otherwise here) were mechanically debrided at the time of cleansing here in the wound-care center today, so a small amount of pain, drainage or bleeding from that process might be expected, and is normal.      All products for home use, including multiple products for a single wound if applicable, are medically necessary in order to achieve the best chance at timely wound healing. See provider documentation for details if needed.     Substituted dressings applied in the [facility] today, if applicable:           New orders for this week (labs, imaging, medications, etc.):          Additional instructions for specific diagnoses:     +ROHO cushion- use at [address] when sitting!!  +True Balance Air group II mattress      General comments for pressure ulcers:  *  Make sure you stay well-hydrated, and maintain good protein intake. *  Reposition at least every two hours, to keep from having pressure in one spot for too long. *  If you are not sure whether you have the best offloading surfaces (mattress, mattress overlay, chair cushion, heel-protector boots, etc), please ask. *  Moisturize your skin regularly with Vaseline, Aquaphor, Aveeno, CeraVe, Cetaphil, Eucerin, Lubriderm, etc; but keep the skin between your toes dry.   *  If you smoke, your wound can not heal properly -- please talk with us when you're ready to quit.         F/U Appointment is with Dr. Marivel Tanner in 2 week on                                                at                       .     Your nurse  is Fady Ward, ABRAHAM      If we applied slip-resistant hospital socks today, be sure to remove them at least once a day to inspect your toes or feet, even if you're not changing the wraps or dressings underneath.  If you see anything concerning (redness, excess moisture, etc), please call and let us know right away.     Should you experience any significant changes in your wound(s) (including redness, increased warmth, increased pain, increased drainage, odor, or fever) or have questions about your wound care, please contact the [address] [place] [address] at [phone] Monday-Thursday from 8:00 am  4:30 pm, or Friday from 8:00 am - 2:30 pm.  If you need help with your wound outside these hours and cannot wait until we are again available, contact your home-care company (if applicable), your PCP, or go to the nearest emergency room

## 2022-06-17 NOTE — PLAN OF CARE
No changes in wound care regime. Right heel wound healed. Patient will follow up in 2 weeks. Discharge instructions reviewed with patient, all questions answered, copy given to patient. Dressings were applied to all wounds per M.D. Instructions at this visit.

## 2022-06-17 NOTE — PROGRESS NOTES
215 [facility] Physician Orders and Discharge [address], [address]  [place], [facility]  Telephone: [phone]      Fax: [phone]        Your home care Madiha Escobedo  Nurse Danial Lennox [phone]      Your wound-care supplies will be provided by: [employer] orders supplies through [employer]. Please note, depending on your insurance coverage, you may have out-of-pocket expenses for these supplies. Someone from [employer] may call you to confirm your order and discuss those potential costs before they ship your products -- please anticipate that call. If your out-of-pocket cost is going to be less than $200, and [employer] cannot reach you, they may ship your supplies as soon as the order is processed, and will send you a bill. If your out-of-pocket cost is going to be more than $200, [employer] should not ship your supplies until they speak with you. If you have any questions about your supplies or your potential out-of-pocket costs, or if you need to place an order for a refill of supplies (typically monthly), Osman Carpenter is your local representative, and can be reached at [phone]. Information on [employer]'s return policy will also be enclosed with your supplies -- please read that carefully before opening your items.      NAME:  Gris Taveras   DATE of BIRTH:  1950  PRIMARY DIAGNOSIS FOR WOUND CARE CENTER:  Pressure ulcer     Wound cleansing:   Do not scrub or use excessive force. Wash hands with soap and water before and after dressing changes. Prior to applying a clean dressing, cleanse wound with normal saline, wound cleanser, or mild soap and water.  Ask your physician or nurse before getting the wound(s) wet in the shower.                Wound care for home:      Right Lower Leg:    Compression stocking     Left Medial Heel/Leg: HEALED  Aquaphor   Compression stocking     Sacral Wound:   Vashe or Hypochlorous acid soaked gauze applied to wound for 2-3 minutes, no need to rinse  Triad or Zinc Oxide to maegan-wound  Multidex Powder then Collagen with silver to wound bed & tuck into depth of wound  Fluff gauze over wound  Cover with small sacral mepilex border  C to change dressing on Monday, Wednesday, Friday next week        Please note, all wounds (unless stated otherwise here) were mechanically debrided at the time of cleansing here in the wound-care center today, so a small amount of pain, drainage or bleeding from that process might be expected, and is normal.      All products for home use, including multiple products for a single wound if applicable, are medically necessary in order to achieve the best chance at timely wound healing. See provider documentation for details if needed.     Substituted dressings applied in the [facility] today, if applicable:           New orders for this week (labs, imaging, medications, etc.):          Additional instructions for specific diagnoses:     +ROHO cushion- use at [address] when sitting!!  +True Balance Air group II mattress      General comments for pressure ulcers:  *  Make sure you stay well-hydrated, and maintain good protein intake. *  Reposition at least every two hours, to keep from having pressure in one spot for too long. *  If you are not sure whether you have the best offloading surfaces (mattress, mattress overlay, chair cushion, heel-protector boots, etc), please ask. *  Moisturize your skin regularly with Vaseline, Aquaphor, Aveeno, CeraVe, Cetaphil, Eucerin, Lubriderm, etc; but keep the skin between your toes dry.   *  If you smoke, your wound can not heal properly -- please talk with us when you're ready to quit.         F/U Appointment is with Dr. Herberth Milligan in 2 week on                                                at                       .     Your nurse  is Karan Baptiste RN      If we applied slip-resistant hospital socks today, be sure to remove them at least once a day to inspect your toes or feet, even if you're not changing the wraps or dressings underneath.  If you see anything concerning (redness, excess moisture, etc), please call and let us know right away.     Should you experience any significant changes in your wound(s) (including redness, increased warmth, increased pain, increased drainage, odor, or fever) or have questions about your wound care, please contact the [place] [address] at [phone] Monday-Thursday from 8:00 am - 4:30 pm, or Friday from 8:00 am - 2:30 pm.  If you need help with your wound outside these hours and cannot wait until we are again available, contact your home-care company (if applicable), your PCP, or go to the nearest emergency room

## 2022-06-21 NOTE — TELEPHONE ENCOUNTER
BP overall stable. Would like her to retry 0.5 tablet entresto 24-26 mg BID.  Monitor BP and symptoms

## 2022-06-21 NOTE — TELEPHONE ENCOUNTER
Pt called office BP readings this past weekend were:    Friday:  123/56 (before AM meds)  96/59 ( 2hrs after meds)  96/55  100/56 ( bedtime)    Saturday:  125/63 ( bfr AM meds)  91/49 ( 2hrs aftr meds)  107/51  110/55 ( bedtime)    Sunday:  100/59 ( bfr meds)  97/66 (aftr meds)  96/54    *pt sts that she did feel dizzy upon standing and had a slight frontal headache.  Pt also sts that had an episode on Sunday of a dizzy spell @ rest. Please advise, thank you

## 2022-06-27 DIAGNOSIS — I50.22 CHRONIC SYSTOLIC CHF (CONGESTIVE HEART FAILURE) (HCC): ICD-10-CM

## 2022-06-27 LAB
ANION GAP SERPL CALCULATED.3IONS-SCNC: 13 MMOL/L (ref 3–16)
BUN BLDV-MCNC: 21 MG/DL (ref 7–20)
CALCIUM SERPL-MCNC: 8.4 MG/DL (ref 8.3–10.6)
CHLORIDE BLD-SCNC: 97 MMOL/L (ref 99–110)
CO2: 26 MMOL/L (ref 21–32)
CREAT SERPL-MCNC: 1.1 MG/DL (ref 0.6–1.2)
GFR AFRICAN AMERICAN: 59
GFR NON-AFRICAN AMERICAN: 49
GLUCOSE BLD-MCNC: 93 MG/DL (ref 70–99)
POTASSIUM SERPL-SCNC: 4.1 MMOL/L (ref 3.5–5.1)
SODIUM BLD-SCNC: 136 MMOL/L (ref 136–145)

## 2022-06-28 ENCOUNTER — TELEPHONE (OUTPATIENT)
Dept: CARDIOLOGY CLINIC | Age: 72
End: 2022-06-28

## 2022-06-28 NOTE — TELEPHONE ENCOUNTER
Spoke with patient regarding lab results. All questions answered. Patient verbalized understanding. Patient will continue taking 0.5 entresto BID.  Home BP running 99/571 systolic and has some occasional dizziness

## 2022-06-30 PROBLEM — L89.623 PRESSURE ULCER OF LEFT HEEL, STAGE 3 (HCC): Status: RESOLVED | Noted: 2022-01-12 | Resolved: 2022-06-30

## 2022-06-30 NOTE — PROGRESS NOTES
education provided. Pertinent imaging reviewed including independent interpretation include:   None    Pertinent labs reviewed. Medical records and review of external note (s) from other providers done as well. New lab or imaging orders placed:   None     Prescription drug management: N/A     Discussion of management or test interpretation with other qualified health care professional and other external source. Comorbid conditions affecting wound healing: As per PMH which was reviewed. Risk of complications and/or mortality of patient management:  1. This patient has a moderate risk of morbidity and mortality from additional diagnostic testing or treatment. This is due to the above conditions affecting wound healing as well as patient and procedure risk factors. Education and discussion held with patient regarding these disease processes pertinent to wound(s). 2. Other pertinent decisions include: minor surgery or procedures as below. 3. The patient's diagnosis or treatment is not significantly limited by social determinants of health as noted by: N/A . Wound 12/18/20 #2, Sacrum, Pressure Injury, Stage 4, Onset 5/2020 (Active)   Wound Image   06/03/22 1041   Wound Etiology Pressure Stage 4 07/01/22 1004   Dressing Status New dressing applied;Clean;Dry; Intact 06/17/22 1037   Wound Cleansed Soap and water 07/01/22 1004   Dressing/Treatment Other (comment) 06/03/22 1204   Offloading for Diabetic Foot Ulcers Offloading ordered 06/03/22 1204   Wound Length (cm) 1.4 cm 07/01/22 1004   Wound Width (cm) 1 cm 07/01/22 1004   Wound Depth (cm) 0.4 cm 07/01/22 1004   Wound Surface Area (cm^2) 1.4 cm^2 07/01/22 1004   Change in Wound Size % (l*w) -833.33 07/01/22 1004   Wound Volume (cm^3) 0.56 cm^3 07/01/22 1004   Wound Healing % -522 07/01/22 1004   Post-Procedure Length (cm) 1.2 cm 06/17/22 1024   Post-Procedure Width (cm) 1 cm 06/17/22 1024   Post-Procedure Depth (cm) 0.4 cm 06/17/22 1024 Post-Procedure Surface Area (cm^2) 1.2 cm^2 06/17/22 1024   Post-Procedure Volume (cm^3) 0.48 cm^3 06/17/22 1024   Distance Tunneling (cm) 0 cm 05/20/22 0914   Tunneling Position ___ O'Clock 12 10/27/21 0947   Undermining Starts ___ O'Clock 12 06/17/22 0957   Undermining Ends___ O'Clock 6 06/17/22 0957   Undermining Maxium Distance (cm) 0.6 06/17/22 0957   Wound Assessment Pink/red 07/01/22 1004   Drainage Amount Small 07/01/22 1004   Drainage Description Serous 07/01/22 1004   Odor None 07/01/22 1004   Blanca-wound Assessment Maceration 07/01/22 1004   Margins Epibole (rolled edges) 03/18/22 0944   Number of days: 560          Procedures done during this encounter:   Debridement: Excisional Debridement  Indications:  Based on my examination of this patient's wound(s)/ulcer(s) today, debridement is required to promote healing and evaluate the wound base. Risks and benefits discussed with patient who has agreed to proceed. Performed by: Sharon Giraldo MD  Consent obtained:  Yes  Time out taken:  Yes  Pain Control: Anesthetic  Anesthetic: 4% Lidocaine Cream   Using curette the wound(s)/ulcer(s) was/were debrided down through and including the removal of subcutaneous tissue. Devitalized Tissue Debrided:  fibrin, biofilm, slough and exudate  Pre Debridement Measurements:  Are located in the [place]  Documentation Flow Sheet  Wound/Ulcer #: 2  Post Debridement Measurements:  Wound/Ulcer Descriptions are Pre Debridement except measurements: Total Surface Area Debrided:  1.4 sq cm   Diabetic/Pressure/Non Pressure Ulcers only:  Ulcer: Pressure ulcer, Stage 4   Estimated Blood Loss:  Minimal  Hemostasis Achieved:  by pressure  Procedural Pain:  0  / 10   Post Procedural Pain:  0 / 10   Response to treatment:  Well tolerated by patient. TIME: E/M Time spent with patient and/or patient care issues: [x] 15-20 min  [] 21-30 min  [] 31-44 min  [] 45 min or more.    This is above the usual time needed to address patient's chief complaint today: [] Yes  [x] No  This time includes physician non-face-to-face service time visit on the date of service such as  Preparing to see the patient (eg, review of tests)  Obtaining and/or reviewing separately obtained history  Performing a medically necessary appropriate examination and/or evaluation  Counseling and educating the patient/family/caregiver  Ordering medications, tests, or procedures  Referring and communicating with other health care professionals as needed  Documenting clinical information in the electronic or other health record  Independently interpreting results (not reported separately) and communicating results to the patient/family/caregiver  Care coordination (not reported separately)    Objective:    /61   Pulse 85   Temp 98.2 °F (36.8 °C) (Oral)   Resp 20   Ht 5' 8\" (1.727 m)   Wt 166 lb 3.2 oz (75.4 kg)   BMI 25.27 kg/m²   Wt Readings from Last 3 Encounters:   07/01/22 166 lb 3.2 oz (75.4 kg)   06/17/22 164 lb (74.4 kg)   06/08/22 162 lb (73.5 kg)       PHYSICAL EXAM  General: Alert and in no acute distress. Normal appearing  Skin: Warm and dry, no rash  Head: Normocephalic and atraumatic  Eyes: Extraocular eye movements intact, conjunctivae normal, and sclera anicteric  ENT: Hearing grossly normal bilaterally. Normal appearance  Respiratory: no chest wall tenderness. no respiratory distress  GI: Abdomen non-tender and benign  Musculoskeletal: Baseline range of motion in joints. Nontender calves. No cyanosis. Edema trace. Neurologic: Speech normal. At baseline without new focal deficits.  Mental status normal or at baseline    PAST MEDICAL HISTORY        Diagnosis Date    Acute on chronic diastolic heart failure due to coronary artery disease (HCC)     Acute respiratory failure with hypoxia (HCC)     Atherosclerosis of native artery of right lower extremity with rest pain ([facility] [address] 07/25/2017    Back pain     Branch retinal vein occlusion 07/20/2012    Bronchiectasis with acute exacerbation (HCC)     Cellulitis and abscess of left leg 7/11/2021    Cellulitis of left lower extremity 7/11/2021    S/P vein harvest 05/2021 for CABG    CHF (congestive heart failure) (HCC)     Closed compression fracture of thoracic vertebra ([address] 01/15/2020    Closed fracture of facial bone with routine healing 11/21/2016    Closed jaw fracture ([address] 01/15/2020    Community acquired pneumonia of left lower lobe of lung     Compression fracture of L1 lumbar vertebra ([address] 01/15/2020    COPD (chronic obstructive pulmonary disease) ([address]     COVID     Fracture of tibial plateau, closed, left, initial encounter 12/05/2017    HCAP (healthcare-associated pneumonia)     Minimally displaced zone I fracture of sacrum ([address] 09/02/2020    MRSA (methicillin resistant staph aureus) culture positive 07/2021    MRSA (methicillin resistant Staphylococcus aureus) 07/13/2021    wound    Mucus plugging of bronchi     NSTEMI (non-ST elevated myocardial infarction) ([address] 4/23/2021    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 Bone density test last year showed severe osteoporosis. Recently, two broken vertebrae (L1, L2) due to coughing. Diagnosed with tracheomalacia and stated she must cough very hard to clear phlegm. Was coughing due to upper respiratory infections which have been treated. Hx of laminectomy and recent kyphoplasty.    Refractured her jawbone which was previously repaired wi    Post herpetic neuralgia     Pressure ulcer of left heel, stage 3 ([address] 1/12/2022    Proximal humerus fracture 10/01/2019    Rheumatoid arthritis ([address]     Shingles 05/2020    Sleep apnea     Status post incision and drainage 07/2021    Left Leg    Surgical wound dehiscence, initial encounter (at [facility] harvest site) 7/12/2021    Diabetes Sister     Heart Disease Brother     Cancer Neg Hx     Emphysema Neg Hx     Heart Failure Neg Hx        SOCIAL HISTORY    Social History     Tobacco Use    Smoking status: Former Smoker     Packs/day: 1.00     Years: 20.00     Pack years: 20.00     Types: Cigarettes     Quit date: 1991     Years since quittin.2    Smokeless tobacco: Never Used   Vaping Use    Vaping Use: Never used   Substance Use Topics    Alcohol use: Not Currently     Alcohol/week: 0.0 standard drinks     Comment: rarely    Drug use: No       ALLERGIES    Allergies   Allergen Reactions    Atenolol Other (See Comments)     Cough         MEDICATIONS    Current Outpatient Medications on File Prior to Encounter   Medication Sig Dispense Refill    sacubitril-valsartan (ENTRESTO) 24-26 MG per tablet Take half tablet twice a day 30 tablet 0    DENOSUMAB SC Inject into the skin      cyclobenzaprine (FLEXERIL) 10 MG tablet TAKE ONE TABLET BY MOUTH TWICE DAILY AS NEEDED      Menthol, Topical Analgesic, 10 % LIQD Apply topically      DULoxetine (CYMBALTA) 60 MG extended release capsule       Etanercept (ENBREL SURECLICK) 50 MG/ML SOAJ Inject 1 Adjustable Dose Pre-filled Pen Syringe into the skin once a week      torsemide (DEMADEX) 20 MG tablet Take 2 tablets by mouth daily If weight 131 lbs or less, decrease to 20 mg daily 180 tablet 3    metoprolol succinate (TOPROL XL) 25 MG extended release tablet Take 0.5 tablets by mouth daily 45 tablet 3    spironolactone (ALDACTONE) 25 MG tablet Take 1 tablet by mouth daily 90 tablet 3    clopidogrel (PLAVIX) 75 MG tablet Take 1 tablet by mouth daily 90 tablet 3    azithromycin (ZITHROMAX) 250 MG tablet Take 1 tablet by mouth daily 90 tablet 3    predniSONE (DELTASONE) 1 MG tablet Take 4 mg by mouth daily       ondansetron (ZOFRAN) 4 MG tablet every 8 hours as needed       oxyCODONE-acetaminophen (PERCOCET)  MG per tablet Take 1 tablet by mouth daily.        insulin glargine (LANTUS) 100 UNIT/ML injection vial Inject 30 Units into the skin nightly (Patient taking differently: Inject 15 Units into the skin 2 times daily ) 1 pen 0    DALIRESP 500 MCG tablet TAKE 1 TABLET BY MOUTH DAILY 30 tablet 11    docusate sodium (COLACE) 100 MG capsule Take 100 mg by mouth 2 times daily as needed for Constipation      gabapentin (NEURONTIN) 300 MG capsule Take 300 mg by mouth in the morning and at bedtime.  latanoprost (XALATAN) 0.005 % ophthalmic solution Place 1 drop into both eyes nightly      NARCAN 4 MG/0.1ML LIQD nasal spray as needed       morphine (MS CONTIN) 15 MG extended release tablet 15 mg 2 times daily.  ipratropium (ATROVENT) 0.06 % nasal spray USE 2 SPRAYS BY NASAL ROUTE 2-4 TIMES DAILY (Patient taking differently: 3 times daily as needed ) 1 Bottle 5    albuterol sulfate  (90 Base) MCG/ACT inhaler INHALE 2 PUFFS INTO THE LUNGS EVERY 4 HOURS AS NEEDED FOR WHEEZING 1 Inhaler 5    vitamin D (CHOLECALCIFEROL) 1000 UNIT TABS tablet Take 5,000 Units by mouth daily       calcium carbonate (OSCAL) 500 MG TABS tablet Take 500 mg by mouth daily      atorvastatin (LIPITOR) 40 MG tablet Take 40 mg by mouth      Insulin Syringe-Needle U-100 31G X 5/16\" 0.5 ML MISC USE 5 TIMES DAILY      ACCU-CHEK CEDRICK PLUS strip TEST 4 TIMES DAILY  3    insulin lispro (HUMALOG) 100 UNIT/ML injection vial Inject 0-12 Units into the skin 3 times daily (with meals)      Misc. Devices (ACAPELLA) MISC Take 1 Device by mouth as needed 1 each 0    fluticasone (FLONASE) 50 MCG/ACT nasal spray INHALE 2 SPRAYS IN EACH NOSTRIL DAILY 1 Bottle 5    cetirizine (ZYRTEC) 10 MG tablet Take 10 mg by mouth daily.  omeprazole (PRILOSEC) 40 MG capsule Take 40 mg by mouth daily        No current facility-administered medications on file prior to encounter. Written patient dismissal instructions given to patient and signed by patient or POA.          Electronically signed by Ina Johnson MD on 7/1/2022 at 10:18 AM

## 2022-06-30 NOTE — PROGRESS NOTES
[facility] Progress Note    Felicita Paulson     : 1950    DATE OF VISIT:  2022    Subjective:     Felicita Paulson is a 80 y.o. female who has a pressure ulcer located on the coccyx. Significant symptoms or pertinent wound history since last visit: feeling well overall, no fever, no recent cardiopulmonary symptoms. Stable leg swelling; getting a pair of grippy gloves to help her don and doff her newer (stronger) compression socks. Appetite good, eating well, offloading well. Additional ulcer(s) noted? no. The heel looks healed. Her current medication list consists of Acapella, DULoxetine, Denosumab, Etanercept, Insulin Syringe-Needle U-100, Menthol (Topical Analgesic), Roflumilast, albuterol sulfate HFA, atorvastatin, azithromycin, blood glucose test strips, calcium carbonate, cetirizine, clopidogrel, cyclobenzaprine, docusate sodium, fluticasone, gabapentin, insulin glargine, insulin lispro, ipratropium, latanoprost, metoprolol succinate, morphine, naloxone, omeprazole, ondansetron, oxyCODONE-acetaminophen, predniSONE, sacubitril-valsartan, spironolactone, torsemide, and vitamin D. Allergies: Atenolol    Objective:     Vitals:    22 1008   BP: (!) 152/71   Pulse: 75   Resp: 20   Temp: 98.4 °F (36.9 °C)   TempSrc: Oral   Weight: 164 lb (74.4 kg)   Height: 5' 8\" (1.727 m)     AAOx3, fatigued, NAD  No cellulitis, angitis, fluctuance  No acute arthritis or bursitis  Moderate LE edema now  No contact dermatitis or cutaneous Candidiasis  Blanca-ulcer skin: indurated, pink, warm, mild maceration and some hyperkeratosis / epibole at sacrum  Ulcer(s):  slowly a bit smaller, more red, almost all granulation now, a bit of fibrin and biofilm, some recurrent peripheral hypergranulation, no tunnels, mild undermining; heel healed, with a bit of hyperkeratosis. Photos also saved in electronic chart.     Today's wound measurements, per RN documentation:  [REMOVED] Wound 21 #5, Left Heel, Pressure, Stage 3 (onset 12/10/2021)-Wound Length (cm): 0 cm  Wound 12/18/20 #2, Sacrum, Pressure Injury, Stage 4, Onset 5/2020-Wound Length (cm): 1.2 cm    [REMOVED] Wound 12/20/21 #5, Left Heel, Pressure, Stage 3 (onset 12/10/2021)-Wound Width (cm): 0 cm  Wound 12/18/20 #2, Sacrum, Pressure Injury, Stage 4, Onset 5/2020-Wound Width (cm): 1 cm    [REMOVED] Wound 12/20/21 #5, Left Heel, Pressure, Stage 3 (onset 12/10/2021)-Wound Depth (cm): 0 cm  Wound 12/18/20 #2, Sacrum, Pressure Injury, Stage 4, Onset 5/2020-Wound Depth (cm): 0.4 cm    Assessment:     Patient Active Problem List   Diagnosis Code    [facility] M06.9    Psoriasis L40.9    GERD (gastroesophageal reflux disease) K21.9    Anemia D64.9    Cylindrical bronchiectasis ([facility]) J47.9    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    Immunocompromised state ([facility] D84.9    Coronary artery disease I25.10    Bilateral lower extremity edema R60.0    Essential hypertension I10    Lumbar spondylosis M47.816    Mitral valve insufficiency and aortic valve insufficiency I08.0    Mixed hyperlipidemia E78.2    Myopia of both eyes H52.13    Osteoporosis M81.0    Other chronic sinusitis J32.8    Primary open angle glaucoma (POAG) of both eyes, mild stage H40.1131    Primary osteoarthritis of right hip M16.11    Type 2 diabetes mellitus with unspecified diabetic retinopathy without macular edema ([facility]) E11.319    Type 2 diabetes mellitus with diabetic peripheral angiopathy without gangrene, with long-term current use of insulin ([facility]) E11.51, Z79.4    Pressure ulcer of coccygeal region, stage 4 ([facility]) L89.154    Mild malnutrition ([facility]) E44.1    Chronic diastolic congestive heart failure ([facility]) I50.32    Chronic obstructive pulmonary disease ([facility]) J44.9    Hypergranulation L92.9    Nonrheumatic mitral (valve) insufficiency I34.0    Chronic respiratory failure with hypoxia ([facility]) J96.11    Venous insufficiency of left lower extremity I87.2       Assessment of today's active condition(s): RA, poor mobility, slowly healing stage 4 sacral pressure ulcer, no signs of infection, better offloaded than months ago; healed pressure ulcer of the heel, that she picked up when hospitalized for COVID, CHF, etc. Hx of venous insufficiency, with recent saphenous vein harvest for CABG, and some chronic leg edema. Factors contributing to occurrence and/or persistence of the chronic ulcer include diabetes, chronic pressure, decreased mobility, shear force and immunosuppression. Medical necessity of today's visit is shown by the above documentation. Sharp debridement is indicated today, based upon the exam findings in the wound(s) above. Procedure note:     Consent obtained. Time out performed per [facility]. Anesthetic  Anesthetic: 4% Lidocaine Cream     Using a curette, I sharply debrided the sacrum ulcer(s) down through and including the removal of dermis. The type(s) of tissue debrided included fibrin, biofilm and necrotic/eschar. Total Surface Area Debrided: 2 sq cm. The ulcers were then irrigated with normal saline solution. The procedure was completed with a small amount of bleeding, and hemostasis was with pressure. The patient tolerated the procedure well, with no significant complications. The patient's level of pain during and after the procedure was monitored. Post-debridement measurements, if different from pre-debridement, are in the flowsheet as well.  ______________    To encourage better epithelial cell coverage, I did use AgNO3 to chemically cauterize hypergranulation tissue on the sacrum ulcer(s), after application of 4% lidocaine topical solution. This was tolerated well, with no pain or skin injury.      Discharge plan:     Treatment in the wound care center today, per RN documentation: [REMOVED] Wound 12/20/21 #5, Left Heel, Pressure, Stage 3 (onset 12/10/2021)-Dressing/Treatment: Moisturizing cream  Wound 12/18/20 #2, Sacrum, Pressure Injury, Stage 4, Onset 5/2020-Dressing/Treatment:  (ZnO2(maegan), Collagen,gauze ,Mepilex Border). Keep focused on protein intake and glucose control and offloading; has a ROHO cushion and a group 2 mattress for her hospital bed. I reminded the patient of the importance of weight management and smoking cessation, if applicable; also encouraged ambulation as tolerated, additional lower extremity exercises as instructed in our education sheet, leg elevation when at rest, and compliance with any recommended dietary, diuretic and compression therapies. Continue daily compression stockings, moving to the stronger ones when she's able to get them on and off consistently with the help of an assistive device / gloves, etc.     Home treatment: good handwashing before and after any dressing changes. Cleanse wound with saline or soap & water before dressing change. May use Vaseline (petrolatum), Aquaphor, Aveeno, CeraVe, Cetaphil, Eucerin, Lubriderm, etc for dry skin. Dressing type for home: Hypochlorous acid spray, Periwound zinc oxide, Multidex, Kathia and Dry cover dressing, three times weekly. Written discharge instructions given to patient. Follow up in 2 weeks.     Electronically signed by Chad Walsh MD on 6/30/2022 at 1:19 PM.

## 2022-07-01 ENCOUNTER — HOSPITAL ENCOUNTER (OUTPATIENT)
Dept: WOUND CARE | Age: 72
Discharge: HOME | End: 2022-07-01
Payer: MEDICARE

## 2022-07-01 VITALS
BODY MASS INDEX: 25.19 KG/M2 | TEMPERATURE: 98.2 F | RESPIRATION RATE: 20 BRPM | DIASTOLIC BLOOD PRESSURE: 61 MMHG | HEIGHT: 68 IN | SYSTOLIC BLOOD PRESSURE: 118 MMHG | WEIGHT: 166.2 LBS | HEART RATE: 85 BPM

## 2022-07-01 DIAGNOSIS — L92.9 HYPERGRANULATION: ICD-10-CM

## 2022-07-01 DIAGNOSIS — I87.2 VENOUS INSUFFICIENCY OF LEFT LOWER EXTREMITY: ICD-10-CM

## 2022-07-01 DIAGNOSIS — L89.154 PRESSURE ULCER OF COCCYGEAL REGION, STAGE 4 (HCC): Primary | ICD-10-CM

## 2022-07-01 PROCEDURE — 11042 DBRDMT SUBQ TIS 1ST 20SQCM/<: CPT

## 2022-07-01 PROCEDURE — 11042 DBRDMT SUBQ TIS 1ST 20SQCM/<: CPT | Performed by: EMERGENCY MEDICINE

## 2022-07-01 RX ORDER — LIDOCAINE 40 MG/G
CREAM TOPICAL ONCE
Status: CANCELLED | OUTPATIENT
Start: 2022-07-01 | End: 2022-07-01

## 2022-07-01 RX ORDER — BACITRACIN ZINC AND POLYMYXIN B SULFATE 500; 1000 [USP'U]/G; [USP'U]/G
OINTMENT TOPICAL ONCE
Status: CANCELLED | OUTPATIENT
Start: 2022-07-01 | End: 2022-07-01

## 2022-07-01 RX ORDER — LIDOCAINE 40 MG/G
CREAM TOPICAL ONCE
Status: DISCONTINUED | OUTPATIENT
Start: 2022-07-01 | End: 2022-07-02 | Stop reason: HOSPADM

## 2022-07-01 RX ORDER — LIDOCAINE 50 MG/G
OINTMENT TOPICAL ONCE
Status: CANCELLED | OUTPATIENT
Start: 2022-07-01 | End: 2022-07-01

## 2022-07-01 RX ORDER — LIDOCAINE HYDROCHLORIDE 40 MG/ML
SOLUTION TOPICAL ONCE
Status: CANCELLED | OUTPATIENT
Start: 2022-07-01 | End: 2022-07-01

## 2022-07-01 ASSESSMENT — PAIN SCALES - GENERAL: PAINLEVEL_OUTOF10: 0

## 2022-07-01 NOTE — PLAN OF CARE
Continued improvement noted in wound. No changes in primary wound dressings. Home care remains in place for intermittent wound care in the home. Discharge instructions reviewed with patient, all questions answered, copy given to patient. Dressings were applied to all wounds per M.D. Instructions at this visit.

## 2022-07-01 NOTE — PROGRESS NOTES
silver to wound bed & tuck into depth of wound  Fluff gauze over wound  Cover with small sacral mepilex border  C to change dressing on Monday, Wednesday, Friday next week        Please note, all wounds (unless stated otherwise here) were mechanically debrided at the time of cleansing here in the wound-care center today, so a small amount of pain, drainage or bleeding from that process might be expected, and is normal.      All products for home use, including multiple products for a single wound if applicable, are medically necessary in order to achieve the best chance at timely wound healing. See provider documentation for details if needed.     Substituted dressings applied in the [address] [place] [address] today, if applicable:           New orders for this week (labs, imaging, medications, etc.):           Additional instructions for specific diagnoses:     +ROHO cushion- use at [address] when sitting!!  +True Balance Air group II mattress      General comments for pressure ulcers:  *  Make sure you stay well-hydrated, and maintain good protein intake. *  Reposition at least every two hours, to keep from having pressure in one spot for too long. *  If you are not sure whether you have the best offloading surfaces (mattress, mattress overlay, chair cushion, heel-protector boots, etc), please ask. *  Moisturize your skin regularly with Vaseline, Aquaphor, Aveeno, CeraVe, Cetaphil, Eucerin, Lubriderm, etc; but keep the skin between your toes dry. *  If you smoke, your wound can not heal properly -- please talk with us when you're ready to quit.         F/U Appointment is with Dr. Meredith Nix in 2 weeks on                                                at                       .     Your nurse  is Yaneli Ni RN      If we applied slip-resistant hospital socks today, be sure to remove them at least once a day to inspect your toes or feet, even if you're not changing the wraps or dressings underneath.  If you see anything concerning (redness, excess moisture, etc), please call and let us know right away.     Should you experience any significant changes in your wound(s) (including redness, increased warmth, increased pain, increased drainage, odor, or fever) or have questions about your wound care, please contact the [place] [address] at [phone] Monday-Thursday from 8:00 am - 4:30 pm, or Friday from 8:00 am - 2:30 pm.  If you need help with your wound outside these hours and cannot wait until we are again available, contact your home-care company (if applicable), your PCP, or go to the nearest emergency room

## 2022-07-11 NOTE — DISCHARGE INSTRUCTIONS
[address] [facility] Physician Orders and Discharge [address], [address]  [place], [facility]  Telephone: [phone]      Fax: [phone]        Your home care Madiha Escobedo  Nurse May Ready [phone]      Your wound-care supplies will be provided by: [facility] AT [facility] orders supplies through [employer]. Please note, depending on your insurance coverage, you may have out-of-pocket expenses for these supplies. Someone from [place] may call you to confirm your order and discuss those potential costs before they ship your products -- please anticipate that call. If your out-of-pocket cost is going to be less than $200, and [employer] cannot reach you, they may ship your supplies as soon as the order is processed, and will send you a bill. If your out-of-pocket cost is going to be more than $200, [employer] should not ship your supplies until they speak with you. If you have any questions about your supplies or your potential out-of-pocket costs, or if you need to place an order for a refill of supplies (typically monthly), Sung Coleman is your local representative, and can be reached at [phone]. Information on [employer]'s return policy will also be enclosed with your supplies -- please read that carefully before opening your items.      NAME:  Gris Taveras   DATE of BIRTH:  1950  PRIMARY DIAGNOSIS FOR WOUND CARE CENTER:  Pressure ulcer     Wound cleansing:   Do not scrub or use excessive force. Wash hands with soap and water before and after dressing changes. Prior to applying a clean dressing, cleanse wound with normal saline, wound cleanser, or mild soap and water.  Ask your physician or nurse before getting the wound(s) wet in the shower.                Wound care for home:      Sacral Wound:   Vashe or Hypochlorous acid soaked gauze applied to wound for 2-3 minutes, no need to rinse  Triad or Zinc Oxide to maegan-wound  Multidex Powder then Collagen with silver to wound bed & tuck into depth of wound  Fluff gauze over wound  Cover with small sacral mepilex border  C to change dressing on Monday, Wednesday, Friday next week        Please note, all wounds (unless stated otherwise here) were mechanically debrided at the time of cleansing here in the wound-care center today, so a small amount of pain, drainage or bleeding from that process might be expected, and is normal.      All products for home use, including multiple products for a single wound if applicable, are medically necessary in order to achieve the best chance at timely wound healing. See provider documentation for details if needed.     Substituted dressings applied in the [facility] today, if applicable:           New orders for this week (labs, imaging, medications, etc.):           Additional instructions for specific diagnoses:     +ROHO cushion- use at [address] when sitting!!  +True Balance Air group II mattress      General comments for pressure ulcers:  *  Make sure you stay well-hydrated, and maintain good protein intake. *  Reposition at least every two hours, to keep from having pressure in one spot for too long. *  If you are not sure whether you have the best offloading surfaces (mattress, mattress overlay, chair cushion, heel-protector boots, etc), please ask. *  Moisturize your skin regularly with Vaseline, Aquaphor, Aveeno, CeraVe, Cetaphil, Eucerin, Lubriderm, etc; but keep the skin between your toes dry. *  If you smoke, your wound can not heal properly -- please talk with us when you're ready to quit.         F/U Appointment is with Dr. Fela Coulter in 2 weeks on                                                at                       .     Your nurse  is Rosmery Johnson RN      If we applied slip-resistant hospital socks today, be sure to remove them at least once a day to inspect your toes or feet, even if you're not changing the wraps or dressings underneath.  If you see anything concerning (redness, excess moisture, etc), please call and let us know right away.     Should you experience any significant changes in your wound(s) (including redness, increased warmth, increased pain, increased drainage, odor, or fever) or have questions about your wound care, please contact the [facility] at [phone] Monday-Thursday from 8:00 am - 4:30 pm, or Friday from 8:00 am - 2:30 pm.  If you need help with your wound outside these hours and cannot wait until we are again available, contact your home-care company (if applicable), your PCP, or go to the nearest emergency room

## 2022-07-15 ENCOUNTER — HOSPITAL ENCOUNTER (OUTPATIENT)
Dept: WOUND CARE | Age: 72
Discharge: HOME | End: 2022-07-15
Payer: MEDICARE

## 2022-07-15 VITALS
SYSTOLIC BLOOD PRESSURE: 89 MMHG | RESPIRATION RATE: 18 BRPM | DIASTOLIC BLOOD PRESSURE: 47 MMHG | HEIGHT: 68 IN | TEMPERATURE: 98 F | HEART RATE: 80 BPM | WEIGHT: 168.25 LBS | BODY MASS INDEX: 25.5 KG/M2

## 2022-07-15 DIAGNOSIS — L92.9 HYPERGRANULATION: ICD-10-CM

## 2022-07-15 DIAGNOSIS — L89.154 PRESSURE ULCER OF COCCYGEAL REGION, STAGE 4 (HCC): ICD-10-CM

## 2022-07-15 DIAGNOSIS — I87.2 VENOUS INSUFFICIENCY OF LEFT LOWER EXTREMITY: Primary | ICD-10-CM

## 2022-07-15 PROCEDURE — 97597 DBRDMT OPN WND 1ST 20 CM/<: CPT

## 2022-07-15 PROCEDURE — 97597 DBRDMT OPN WND 1ST 20 CM/<: CPT | Performed by: INTERNAL MEDICINE

## 2022-07-15 RX ORDER — LIDOCAINE HYDROCHLORIDE 40 MG/ML
SOLUTION TOPICAL ONCE
Status: CANCELLED | OUTPATIENT
Start: 2022-07-15 | End: 2022-07-15

## 2022-07-15 RX ORDER — BACITRACIN ZINC AND POLYMYXIN B SULFATE 500; 1000 [USP'U]/G; [USP'U]/G
OINTMENT TOPICAL ONCE
Status: CANCELLED | OUTPATIENT
Start: 2022-07-15 | End: 2022-07-15

## 2022-07-15 RX ORDER — LIDOCAINE 50 MG/G
OINTMENT TOPICAL ONCE
Status: CANCELLED | OUTPATIENT
Start: 2022-07-15 | End: 2022-07-15

## 2022-07-15 RX ORDER — LIDOCAINE 40 MG/G
CREAM TOPICAL ONCE
Status: CANCELLED | OUTPATIENT
Start: 2022-07-15 | End: 2022-07-15

## 2022-07-15 RX ORDER — LIDOCAINE 40 MG/G
CREAM TOPICAL ONCE
Status: DISCONTINUED | OUTPATIENT
Start: 2022-07-15 | End: 2022-07-16 | Stop reason: HOSPADM

## 2022-07-15 NOTE — PROGRESS NOTES
215 [facility] ([place] [place] Physician Orders and Discharge [address], [address]  [place], [facility]  Telephone: [phone]      Fax: [phone]        Your home care company:   Hong Ponce [phone]      Your wound-care supplies will be provided by: [employer] orders supplies through [facility]. Please note, depending on your insurance coverage, you may have out-of-pocket expenses for these supplies. Someone from [employer] may call you to confirm your order and discuss those potential costs before they ship your products -- please anticipate that call. If your out-of-pocket cost is going to be less than $200, and [employer] cannot reach you, they may ship your supplies as soon as the order is processed, and will send you a bill. If your out-of-pocket cost is going to be more than $200, [place] should not ship your supplies until they speak with you. If you have any questions about your supplies or your potential out-of-pocket costs, or if you need to place an order for a refill of supplies (typically monthly), Teresa is your local representative, and can be reached at [phone]. Information on [place]'s return policy will also be enclosed with your supplies -- please read that carefully before opening your items. NAME:  Oly Taveras   YOB: 1950  PRIMARY DIAGNOSIS FOR WOUND CARE CENTER:  Pressure ulcer     Wound cleansing:   Do not scrub or use excessive force. Wash hands with soap and water before and after dressing changes. Prior to applying a clean dressing, cleanse wound with normal saline, wound cleanser, or mild soap and water. Ask your physician or nurse before getting the wound(s) wet in the shower.                 Wound care for home:      Sacral Wound:   Vashe or Hypochlorous acid soaked gauze applied to wound for 2-3 minutes, no need to rinse  Triad or Zinc Oxide to maegan-wound  Multidex Powder then Collagen with concerning (redness, excess moisture, etc), please call and let us know right away.      Should you experience any significant changes in your wound(s) (including redness, increased warmth, increased pain, increased drainage, odor, or fever) or have questions about your wound care, please contact the [address] [place] [address] at [phone] Monday-Thursday from 8:00 am - 4:30 pm, or Friday from 8:00 am - 2:30 pm.  If you need help with your wound outside these hours and cannot wait until we are again available, contact your home-care company (if applicable), your PCP, or go to the nearest emergency room

## 2022-07-18 RX ORDER — ROFLUMILAST 500 UG/1
TABLET ORAL
Qty: 30 TABLET | Refills: 11 | Status: SHIPPED | OUTPATIENT
Start: 2022-07-18

## 2022-07-25 NOTE — DISCHARGE INSTRUCTIONS
[facility] Physician Orders and Discharge [address]  [address], [address]  [place], [facility]  Telephone: [phone]      Fax: [phone]        Your home care company:   Hong Ponce [phone]      Your wound-care supplies will be provided by: [facility] AT [facility] orders supplies through [facility]. Please note, depending on your insurance coverage, you may have out-of-pocket expenses for these supplies. Someone from [place] may call you to confirm your order and discuss those potential costs before they ship your products -- please anticipate that call. If your out-of-pocket cost is going to be less than $200, and [employer] cannot reach you, they may ship your supplies as soon as the order is processed, and will send you a bill. If your out-of-pocket cost is going to be more than $200, [facility] should not ship your supplies until they speak with you. If you have any questions about your supplies or your potential out-of-pocket costs, or if you need to place an order for a refill of supplies (typically monthly), Jan Alvarez is your local representative, and can be reached at [phone]. Information on [employer]'s return policy will also be enclosed with your supplies -- please read that carefully before opening your items. NAME:  Sky Taveras   YOB: 1950  PRIMARY DIAGNOSIS FOR WOUND CARE CENTER:  Pressure ulcer     Wound cleansing:  Do not scrub or use excessive force. Wash hands with soap and water before and after dressing changes. Prior to applying a clean dressing, cleanse wound with normal saline, wound cleanser, or mild soap and water. Ask your physician or nurse before getting the wound(s) wet in the shower.                 Wound care for home:      Sacral Wound:  Betadine to maegan wound   Triad or Zinc Oxide to maegan-wound  Multidex Powder then Collagen with silver to wound bed & tuck into depth of wound  Fluff gauze over wound  Cover with small sacral mepilex border  [facility] to change dressing on Monday, Wednesday, Friday next week        Please note, all wounds (unless stated otherwise here) were mechanically debrided at the time of cleansing here in the wound-care center today, so a small amount of pain, drainage or bleeding from that process might be expected, and is normal.     All products for home use, including multiple products for a single wound if applicable, are medically necessary in order to achieve the best chance at timely wound healing. See provider documentation for details if needed. Substituted dressings applied in the [facility] today, if applicable:           New orders for this week (labs, imaging, medications, etc.):           Additional instructions for specific diagnoses:     +ROHO cushion- use at [address] when sitting!!  +True Balance Air group II mattress      General comments for pressure ulcers: *  Make sure you stay well-hydrated, and maintain good protein intake. *  Reposition at least every two hours, to keep from having pressure in one spot for too long. *  If you are not sure whether you have the best offloading surfaces (mattress, mattress overlay, chair cushion, heel-protector boots, etc), please ask. *  Moisturize your skin regularly with Vaseline, Aquaphor, Aveeno, CeraVe, Cetaphil, Eucerin, Lubriderm, etc; but keep the skin between your toes dry. *  If you smoke, your wound can not heal properly -- please talk with us when you're ready to quit. F/U Appointment is with Dr. Fela Coulter in 2 weeks on                                                at                       .     Your nurse  is Rosmery Johnson RN     If we applied slip-resistant hospital socks today, be sure to remove them at least once a day to inspect your toes or feet, even if you're not changing the wraps or dressings underneath.  If you see anything concerning (redness, excess moisture, etc), please call and let us know right away.      Should you experience any significant changes in your wound(s) (including redness, increased warmth, increased pain, increased drainage, odor, or fever) or have questions about your wound care, please contact the [place] [address] at [phone] Monday-Thursday from 8:00 am - 4:30 pm, or Friday from 8:00 am - 2:30 pm.  If you need help with your wound outside these hours and cannot wait until we are again available, contact your home-care company (if applicable), your PCP, or go to the nearest emergency room

## 2022-07-29 ENCOUNTER — HOSPITAL ENCOUNTER (OUTPATIENT)
Dept: WOUND CARE | Age: 72
Discharge: HOME | End: 2022-07-29
Payer: MEDICARE

## 2022-07-29 VITALS
DIASTOLIC BLOOD PRESSURE: 54 MMHG | HEART RATE: 85 BPM | BODY MASS INDEX: 25.98 KG/M2 | WEIGHT: 171.4 LBS | SYSTOLIC BLOOD PRESSURE: 107 MMHG | TEMPERATURE: 98.4 F | RESPIRATION RATE: 18 BRPM | HEIGHT: 68 IN

## 2022-07-29 DIAGNOSIS — L92.9 HYPERGRANULATION: ICD-10-CM

## 2022-07-29 DIAGNOSIS — I87.2 VENOUS INSUFFICIENCY OF LEFT LOWER EXTREMITY: Primary | ICD-10-CM

## 2022-07-29 DIAGNOSIS — L89.154 PRESSURE ULCER OF COCCYGEAL REGION, STAGE 4 (HCC): ICD-10-CM

## 2022-07-29 PROCEDURE — 97597 DBRDMT OPN WND 1ST 20 CM/<: CPT

## 2022-07-29 PROCEDURE — 97597 DBRDMT OPN WND 1ST 20 CM/<: CPT | Performed by: INTERNAL MEDICINE

## 2022-07-29 RX ORDER — LIDOCAINE HYDROCHLORIDE 40 MG/ML
SOLUTION TOPICAL ONCE
Status: CANCELLED | OUTPATIENT
Start: 2022-07-29 | End: 2022-07-29

## 2022-07-29 RX ORDER — BACITRACIN ZINC AND POLYMYXIN B SULFATE 500; 1000 [USP'U]/G; [USP'U]/G
OINTMENT TOPICAL ONCE
Status: CANCELLED | OUTPATIENT
Start: 2022-07-29 | End: 2022-07-29

## 2022-07-29 RX ORDER — LIDOCAINE 50 MG/G
OINTMENT TOPICAL ONCE
Status: CANCELLED | OUTPATIENT
Start: 2022-07-29 | End: 2022-07-29

## 2022-07-29 RX ORDER — LIDOCAINE 40 MG/G
CREAM TOPICAL ONCE
Status: CANCELLED | OUTPATIENT
Start: 2022-07-29 | End: 2022-07-29

## 2022-07-29 RX ORDER — LIDOCAINE 40 MG/G
CREAM TOPICAL ONCE
Status: DISCONTINUED | OUTPATIENT
Start: 2022-07-29 | End: 2022-07-30 | Stop reason: HOSPADM

## 2022-07-29 ASSESSMENT — PAIN SCALES - GENERAL: PAINLEVEL_OUTOF10: 0

## 2022-07-29 NOTE — PROGRESS NOTES
[facility] Progress Note    Elaine Leblanc     : 1950    DATE OF VISIT:  7/15/2022    Subjective:     Elaine Leblanc is a 80 y.o. female who has a pressure ulcer located on the sacrum. Significant symptoms or pertinent wound history since last visit: feeling pretty well overall, little wound pain, mild-mod drainage, no F/C/D, eating well, offloading very well. Additional ulcer(s) noted? no. Heel is healed. Her current medication list consists of Acapella, DULoxetine, Denosumab, Etanercept, Insulin Syringe-Needle U-100, Menthol (Topical Analgesic), Roflumilast, albuterol sulfate HFA, atorvastatin, azithromycin, blood glucose test strips, calcium carbonate, cetirizine, clopidogrel, cyclobenzaprine, docusate sodium, fluticasone, gabapentin, insulin glargine, insulin lispro, ipratropium, latanoprost, metoprolol succinate, morphine, naloxone, omeprazole, ondansetron, oxyCODONE-acetaminophen, predniSONE, sacubitril-valsartan, spironolactone, torsemide, and vitamin D. Allergies: Atenolol    Objective:     Vitals:    07/15/22 1341   BP: (!) 89/47   Pulse: 80   Resp: 18   Temp: 98 °F (36.7 °C)   TempSrc: Oral   Weight: 168 lb 4 oz (76.3 kg)   Height: 5' 8\" (1.727 m)     AAOx3, fatigued, NAD   No cellulitis, angitis, fluctuance  No acute arthritis or bursitis  Mild-moderate LE edema now  No contact dermatitis or cutaneous Candidiasis  Blanca-ulcer skin: indurated, pink, warm, mild maceration and some hyperkeratosis / epibole at sacrum  Ulcer(s):  more noticeably smaller this week, more red, almost all granulation now, a bit of fibrin and biofilm, minor epidermal edge necrosis, no tunnels, mild undermining. Photos also saved in electronic chart.     Today's wound measurements, per RN documentation:  Wound 20 #2, Sacrum, Pressure Injury, Stage 4, Onset 2020-Wound Length (cm): 0.5 cm    Wound 20 #2, Sacrum, Pressure Injury, Stage 4, Onset 2020-Wound Width (cm): 0.3 cm    Wound 12/18/20 #2, Sacrum, Pressure Injury, Stage 4, Onset 5/2020-Wound Depth (cm): 0.3 cm    Assessment:     Patient Active Problem List   Diagnosis Code    Rheumatoid arthritis ([facility] [address] M06.9    Psoriasis L40.9    GERD (gastroesophageal reflux disease) K21.9    Anemia D64.9    Cylindrical bronchiectasis (HCC) J47.9    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    Immunocompromised state ([facility] [address] D84.9    Coronary artery disease I25.10    Bilateral lower extremity edema R60.0    Essential hypertension I10    Lumbar spondylosis M47.816    Mitral valve insufficiency and aortic valve insufficiency I08.0    Mixed hyperlipidemia E78.2    Myopia of both eyes H52.13    Osteoporosis M81.0    Other chronic sinusitis J32.8    Primary open angle glaucoma (POAG) of both eyes, mild stage H40.1131    Primary osteoarthritis of right hip M16.11    Type 2 diabetes mellitus with unspecified diabetic retinopathy without macular edema ([facility]) E11.319    Type 2 diabetes mellitus with diabetic peripheral angiopathy without gangrene, with long-term current use of insulin ([facility]) E11.51, Z79.4    Pressure ulcer of coccygeal region, stage 4 ([facility]) L89.154    Mild malnutrition ([facility]) E44.1    Chronic diastolic congestive heart failure ([facility]) I50.32    Chronic obstructive pulmonary disease ([facility]) J44.9    Hypergranulation L92.9    Nonrheumatic mitral (valve) insufficiency I34.0    Chronic respiratory failure with hypoxia ([facility]) J96.11    Venous insufficiency of left lower extremity I87.2       Assessment of today's active condition(s): Hx RA, poor mobility, multiple admission, longstanding but slowly healing stage 4 sacral pressure ulcer, no signs of infection or deep necrosis now; healed heel pressure ulcer. Factors contributing to occurrence and/or persistence of the chronic ulcer include diabetes, chronic pressure, decreased mobility, shear force, and immunosuppression. Medical necessity of today's visit is shown by the above documentation.  Sharp debridement is Eliot Stephens MD on 7/29/2022 at 2:44 PM.

## 2022-07-29 NOTE — PROGRESS NOTES
[address] [facility] Physician Orders and Discharge [facility]  [address], [address]  [place], [facility]  Telephone: [phone]      Fax: [phone]        Your home care company:   [facility] [phone]      Your wound-care supplies will be provided by: [employer] orders supplies through [facility]. Please note, depending on your insurance coverage, you may have out-of-pocket expenses for these supplies. Someone from [place] may call you to confirm your order and discuss those potential costs before they ship your products -- please anticipate that call. If your out-of-pocket cost is going to be less than $200, and [place] cannot reach you, they may ship your supplies as soon as the order is processed, and will send you a bill. If your out-of-pocket cost is going to be more than $200, [facility] should not ship your supplies until they speak with you. If you have any questions about your supplies or your potential out-of-pocket costs, or if you need to place an order for a refill of supplies (typically monthly), Rubén Echeverria is your local representative, and can be reached at [phone]. Information on [facility]'s return policy will also be enclosed with your supplies -- please read that carefully before opening your items. NAME:  Amna Taveras   YOB: 1950  PRIMARY DIAGNOSIS FOR WOUND CARE CENTER:  Pressure ulcer     Wound cleansing:  Do not scrub or use excessive force. Wash hands with soap and water before and after dressing changes. Prior to applying a clean dressing, cleanse wound with normal saline, wound cleanser, or mild soap and water. Ask your physician or nurse before getting the wound(s) wet in the shower.                 Wound care for home:      Sacral Wound:  Betadine to maegan wound   Triad or Zinc Oxide to maegan-wound  Multidex Powder then Collagen with silver to wound bed & tuck into depth of wound  Fluff gauze over right away.      Should you experience any significant changes in your wound(s) (including redness, increased warmth, increased pain, increased drainage, odor, or fever) or have questions about your wound care, please contact the [place] [address] at [phone] Monday-Thursday from 8:00 am - 4:30 pm, or Friday from 8:00 am - 2:30 pm.  If you need help with your wound outside these hours and cannot wait until we are again available, contact your home-care company (if applicable), your PCP, or go to the nearest emergency room

## 2022-08-01 ENCOUNTER — HOSPITAL ENCOUNTER (OUTPATIENT)
Age: 72
Setting detail: SPECIMEN
Discharge: HOME | End: 2022-08-01
Payer: MEDICARE

## 2022-08-01 LAB
BACTERIA: ABNORMAL /HPF
BILIRUBIN URINE: NEGATIVE
BLOOD, URINE: ABNORMAL
CLARITY: CLEAR
COLOR: YELLOW
EPITHELIAL CELLS, UA: ABNORMAL /HPF (ref 0–5)
GLUCOSE URINE: 250 MG/DL
KETONES, URINE: NEGATIVE MG/DL
LEUKOCYTE ESTERASE, URINE: NEGATIVE
MICROSCOPIC EXAMINATION: YES
MUCUS: ABNORMAL /LPF
NITRITE, URINE: NEGATIVE
PH UA: 6 (ref 5–8)
PROTEIN UA: NEGATIVE MG/DL
RBC UA: ABNORMAL /HPF (ref 0–4)
SPECIFIC GRAVITY UA: 1.01 (ref 1–1.03)
URINE TYPE: ABNORMAL
UROBILINOGEN, URINE: 0.2 E.U./DL
WBC UA: ABNORMAL /HPF (ref 0–5)

## 2022-08-01 PROCEDURE — 81001 URINALYSIS AUTO W/SCOPE: CPT

## 2022-08-01 PROCEDURE — 87086 URINE CULTURE/COLONY COUNT: CPT

## 2022-08-03 ENCOUNTER — HOSPITAL ENCOUNTER (OUTPATIENT)
Dept: NON INVASIVE DIAGNOSTICS | Age: 72
Discharge: HOME | End: 2022-08-03
Payer: MEDICARE

## 2022-08-03 DIAGNOSIS — I25.10 CORONARY ARTERY DISEASE INVOLVING NATIVE CORONARY ARTERY OF NATIVE HEART WITHOUT ANGINA PECTORIS: ICD-10-CM

## 2022-08-03 LAB — URINE CULTURE, ROUTINE: NORMAL

## 2022-08-03 PROCEDURE — 93308 TTE F-UP OR LMTD: CPT

## 2022-08-03 NOTE — PROGRESS NOTES
[address] [facility]   Cardiovascular Evaluation    PATIENT: Carli Rubi  DATE: 8/3/2022  MRN: [id number]  CSN: [id number]  : 1950      Primary Care Doctor: Katlyn Zambrano MD  Reason for evaluation:   No chief complaint on file. follow up for CAD. Subjective:   History of present illness on initial date of evaluation:   Carli Rubi is a 80 y.o. patient presents for follow up. PMH of CAD with NSTEMI, anemia, HLD and DM who presents for follow up. She had right fem-pop bypass 2017. Her cardiac cath from 21 showed severe MV CAD. She underwent CABG x3 with BIMAL obliteration with VICKY Womack on 21. On 05/10/21 she underwent mediastinal exploration and evacuation of Hematoma with DR Rocha. [facility] 2021 showed occluded SVG-OM, suspect severe MR due to papillary dysfunction r/t occluded RCA/LCx. She had COVID in 2021. Echo 3/24/2022 EF 30-35%, ascending aorta mildly dilated at 3.6 cm, moderate MR, TR, mild AR, grade II diastolic dysfunction. Today she presents in a wheelchair. She says she is feeling better. Her breathing has improved. Her left leg swells. She is wearing below the knee compression socks. She checks her BP at home. Her BP can go below 112 systolic. She gets fatigued and dizzy occasionally. Patient is taking all cardiac medications as prescribed and tolerates them well. Patient denies current chest pain, sob, palpitations, or syncope.         Patient Active Problem List   Diagnosis    Rheumatoid arthritis ([address]    Psoriasis    GERD (gastroesophageal reflux disease)    Anemia    Cylindrical bronchiectasis ([address]    Tracheobronchomalacia    Immunocompromised state ([address]    Coronary artery disease    Bilateral lower extremity edema    Essential hypertension    Lumbar spondylosis    Mitral valve insufficiency and aortic valve insufficiency    Mixed hyperlipidemia    Myopia of both eyes    Osteoporosis    Other chronic sinusitis    Primary open angle glaucoma (POAG) of both eyes, mild stage    Primary osteoarthritis of right hip    Type 2 diabetes mellitus with unspecified diabetic retinopathy without macular edema ([facility])    Type 2 diabetes mellitus with diabetic peripheral angiopathy without gangrene, with long-term current use of insulin ([facility])    Pressure ulcer of coccygeal region, stage 4 ([facility])    Mild malnutrition ([facility])    Chronic diastolic congestive heart failure ([facility])    Chronic obstructive pulmonary disease ([facility])    Hypergranulation    Nonrheumatic mitral (valve) insufficiency    Chronic respiratory failure with hypoxia ([facility])    Venous insufficiency of left lower extremity         Past Medical History:   has a past medical history of Acute on chronic diastolic heart failure due to coronary artery disease ([address] Acute respiratory failure with hypoxia ([address] Atherosclerosis of native artery of right lower extremity with rest pain ([address] Back pain, Branch retinal vein occlusion, Bronchiectasis with acute exacerbation ([facility]), Cellulitis and abscess of left leg, Cellulitis of left lower extremity, CHF (congestive heart failure) ([facility]), Closed compression fracture of thoracic vertebra ([facility]), Closed fracture of facial bone with routine healing, Closed jaw fracture ([address] Community acquired pneumonia of left lower lobe of lung, Compression fracture of L1 lumbar vertebra ([facility]), COPD (chronic obstructive pulmonary disease) ([address] COVID, Fracture of tibial plateau, closed, left, initial encounter, HCAP (healthcare-associated pneumonia), Minimally displaced zone I fracture of sacrum ([facility]), MRSA (methicillin resistant staph aureus) culture positive, MRSA (methicillin resistant Staphylococcus aureus), Mucus plugging of bronchi, NSTEMI (non-ST elevated myocardial infarction) ([address] Osteomyelitis of mandible, Osteoporosis with pathological fracture, Post herpetic neuralgia, Pressure ulcer of left heel, stage 3 ([facility]), Proximal humerus fracture, Rheumatoid arthritis ([address] Shingles, Sleep apnea, Status post incision and drainage, Surgical wound dehiscence, initial encounter (at [facility] SVG harvest site), Temporal arteritis ([facility] Tobacco use, Tracheomalacia, and Vitreous hemorrhage, right eye ([facility] Surgical History:   has a past surgical history that includes hernia repair; Mandible fracture surgery; Foot surgery; Elbow surgery; Cataract removal; Tubal ligation; Knee arthroscopy; Kyphosis surgery; laminectomy; Spinal fusion; Septoplasty (05/07/2013); Artery surgery (05/30/2013); Upper gastrointestinal endoscopy (04/08/2014); bronchoscopy; bronchoscopy (N/A, 06/12/2019); Mandible fracture surgery (02/2020); back surgery (08/2020); other surgical history (01/08/2021); Pressure ulcer debridement (N/A, 01/08/2021); Artery Biopsy (Right, 03/01/2021); Coronary artery bypass graft (N/A, 05/03/2021); Mediastinoscopy (N/A, 05/05/2021); Cardiac surgery (05/03/2021); Leg Surgery (Left, 7/13/2021); IR MIDLINE CATH (7/14/2021); transesophageal echocardiogram (11/02/2021); and Colonoscopy. Social History:   reports that she quit smoking about 31 years ago. Her smoking use included cigarettes. She has a 20.00 pack-year smoking history. She has never used smokeless tobacco. She reports that she does not currently use alcohol. She reports that she does not use drugs. Family History:  No evidence for sudden cardiac death or premature CAD    Home Medications:  Reviewed and are listed in nursing record.  and/or listed below  Current Outpatient Medications   Medication Sig Dispense Refill    DALIRESP 500 MCG tablet TAKE ONE TABLET BY MOUTH EVERY DAY 30 tablet 11    sacubitril-valsartan (ENTRESTO) 24-26 MG per tablet Take half tablet twice a day 30 tablet 0    DENOSUMAB SC Inject into the skin      cyclobenzaprine (FLEXERIL) 10 MG tablet TAKE ONE TABLET BY MOUTH TWICE DAILY AS NEEDED      Menthol, Topical Analgesic, 10 % LIQD Apply topically      DULoxetine (CYMBALTA) 60 MG extended release capsule Etanercept (ENBREL SURECLICK) 50 MG/ML SOAJ Inject 1 Adjustable Dose Pre-filled Pen Syringe into the skin once a week      torsemide (DEMADEX) 20 MG tablet Take 2 tablets by mouth daily If weight 131 lbs or less, decrease to 20 mg daily 180 tablet 3    metoprolol succinate (TOPROL XL) 25 MG extended release tablet Take 0.5 tablets by mouth daily 45 tablet 3    spironolactone (ALDACTONE) 25 MG tablet Take 1 tablet by mouth daily 90 tablet 3    clopidogrel (PLAVIX) 75 MG tablet Take 1 tablet by mouth daily 90 tablet 3    azithromycin (ZITHROMAX) 250 MG tablet Take 1 tablet by mouth daily 90 tablet 3    predniSONE (DELTASONE) 1 MG tablet Take 4 mg by mouth daily       ondansetron (ZOFRAN) 4 MG tablet every 8 hours as needed       oxyCODONE-acetaminophen (PERCOCET)  MG per tablet Take 1 tablet by mouth daily. insulin glargine (LANTUS) 100 UNIT/ML injection vial Inject 30 Units into the skin nightly (Patient taking differently: Inject 15 Units into the skin 2 times daily ) 1 pen 0    docusate sodium (COLACE) 100 MG capsule Take 100 mg by mouth 2 times daily as needed for Constipation      gabapentin (NEURONTIN) 300 MG capsule Take 300 mg by mouth in the morning and at bedtime. latanoprost (XALATAN) 0.005 % ophthalmic solution Place 1 drop into both eyes nightly      NARCAN 4 MG/0.1ML LIQD nasal spray as needed       morphine (MS CONTIN) 15 MG extended release tablet 15 mg 2 times daily.        ipratropium (ATROVENT) 0.06 % nasal spray USE 2 SPRAYS BY NASAL ROUTE 2-4 TIMES DAILY (Patient taking differently: 3 times daily as needed ) 1 Bottle 5    albuterol sulfate  (90 Base) MCG/ACT inhaler INHALE 2 PUFFS INTO THE LUNGS EVERY 4 HOURS AS NEEDED FOR WHEEZING 1 Inhaler 5    vitamin D (CHOLECALCIFEROL) 1000 UNIT TABS tablet Take 5,000 Units by mouth daily       calcium carbonate (OSCAL) 500 MG TABS tablet Take 500 mg by mouth daily      atorvastatin (LIPITOR) 40 MG tablet Take 40 mg by mouth Insulin Syringe-Needle U-100 31G X 5/16\" 0.5 ML MISC USE 5 TIMES DAILY      ACCU-CHEK CEDRICK PLUS strip TEST 4 TIMES DAILY  3    insulin lispro (HUMALOG) 100 UNIT/ML injection vial Inject 0-12 Units into the skin 3 times daily (with meals)      Misc. Devices (ACAPELLA) MISC Take 1 Device by mouth as needed 1 each 0    fluticasone (FLONASE) 50 MCG/ACT nasal spray INHALE 2 SPRAYS IN EACH NOSTRIL DAILY 1 Bottle 5    cetirizine (ZYRTEC) 10 MG tablet Take 10 mg by mouth daily. omeprazole (PRILOSEC) 40 MG capsule Take 40 mg by mouth daily        No current facility-administered medications for this visit. Allergies:  Atenolol     Review of Systems:   A 14 point review of symptoms completed. Pertinent positives identified in the HPI, all other review of symptoms negative as below. Objective:   PHYSICAL EXAM:    There were no vitals filed for this visit.          Wt Readings from Last 3 Encounters:   07/29/22 171 lb 6.4 oz (77.7 kg)   07/15/22 168 lb 4 oz (76.3 kg)   07/01/22 166 lb 3.2 oz (75.4 kg)         General Appearance:  Alert, cooperative, no distress, appears stated age   Head:  Normocephalic, atraumatic   Eyes:  PERRL, conjunctiva/corneas clear   Nose: Nares normal, no drainage or sinus tenderness   Throat: Lips, mucosa, and tongue normal   Neck: Supple, symmetrical, trachea midline, NL thyroid no carotid bruit or JVD   Lungs:   CTAB, respirations unlabored, well-healed surgical scar   Chest Wall:  No tenderness or deformity   Heart:  Regular rhythm and normal rate; S1, S2 are normal;   no murmur noted; no rub or gallop   Abdomen:   Soft, non-tender, +BS x 4, no masses, no organomegaly   Extremities: Extremities normal, atraumatic, no cyanosis 1+ BLE pitting L>R  edema   Pulses: 2+ and symmetric   Skin: Skin color, texture, turgor normal, no rashes or lesions   Pysch: Normal mood and affect   Neurologic: Normal gross motor and sensory exam.         LABS   CBC:      Lab Results   Component Value Date/Time    WBC 5.6 06/06/2022 06:45 PM    RBC 3.86 06/06/2022 06:45 PM    HGB 11.1 06/06/2022 06:45 PM    HCT 33.8 06/06/2022 06:45 PM    MCV 87.4 06/06/2022 06:45 PM    RDW 16.0 06/06/2022 06:45 PM     06/06/2022 06:45 PM     CMP:  Lab Results   Component Value Date/Time     06/27/2022 11:40 AM    K 4.1 06/27/2022 11:40 AM    K 4.3 06/06/2022 06:45 PM    CL 97 06/27/2022 11:40 AM    CO2 26 06/27/2022 11:40 AM    BUN 21 06/27/2022 11:40 AM    CREATININE 1.1 06/27/2022 11:40 AM    GFRAA 59 06/27/2022 11:40 AM    GFRAA >60 05/07/2013 07:32 AM    AGRATIO 1.0 06/06/2022 06:45 PM    LABGLOM 49 06/27/2022 11:40 AM    GLUCOSE 93 06/27/2022 11:40 AM    PROT 7.7 06/06/2022 06:45 PM    PROT 7.1 03/21/2013 12:37 PM    CALCIUM 8.4 06/27/2022 11:40 AM    BILITOT 0.4 06/06/2022 06:45 PM    ALKPHOS 72 06/06/2022 06:45 PM    AST 22 06/06/2022 06:45 PM    ALT 20 06/06/2022 06:45 PM     PT/INR:   No results found for: PTINR  Liver:  No components found for: CHLPL  Lab Results   Component Value Date    ALT 20 06/06/2022    AST 22 06/06/2022    ALKPHOS 72 06/06/2022    BILITOT 0.4 06/06/2022     Lab Results   Component Value Date    LABA1C 7.7 12/20/2021     Lipids:         Lab Results   Component Value Date    TRIG 85 10/29/2021    TRIG 97 08/11/2021    TRIG 64 05/03/2021            Lab Results   Component Value Date    HDL 34 (L) 10/29/2021    HDL 34 (L) 08/11/2021    HDL 38 (L) 05/03/2021            Lab Results   Component Value Date    LDLCALC 61 10/29/2021    LDLCALC 54 08/11/2021    LDLCALC 35 05/03/2021            Lab Results   Component Value Date    LABVLDL 17 10/29/2021    LABVLDL 19 08/11/2021    LABVLDL 13 05/03/2021         CARDIAC DATA   EKG 6/6/2022  Normal sinus rhythm  Possible Left atrial enlargement  Septal infarct , age undetermined  Abnormal ECG    ECHO 8/3/2022   Summary   Limited exam.   Global left ventricular systolic function is low normal with ejection   fraction estimated from 50 % to 55 %. Abnormal (paradoxical) septal motion is present likely due to post operative   status. Mild to moderate mitral regurgitation. Mild aortic regurgitation. Moderate tricuspid regurgitation. Compared to exam done 3/24/2022, Left ventricular systolic function has   improved. ECHO 3/24/2022  Left ventricular systolic function is moderately reduced with ejection   fraction estimated at 30-35 %. Moderate global hypokinesis is present. Left ventricular size is severely increased. Heart is spherical.Wall   thickness is normal.   The left atrium is mildly dilated. The ascending aorta is mildly dilated at 3.6cm. Grade II diastolic dysfunction with elevated filling pressure. Moderate mitral regurgitation. Mild aortic regurgitation is present. Moderate tricuspid regurgitation. ECHO Transesophageal 10/29/2021   Summary   -- Ejection fraction is visually estimated to be 50-55 %. -- Mild mitral regurgitation. -- The left atrium is mildly dilated. There is no evidence of mass or   thrombus in the left atrium or appendage. -- Bubble study was performed and showed grade I pulmonary arteriovenous   malformation ( < 30 bubbles in left ventricle after 3rd cardiac cycle). Moderate layered plaque is noted in the ascending aorta and arch.     STRESS TEST:    Cath:[facility] 10/7/2016  This is a right dominant coronary arterial system    Left Main coronary artery: distal 30-40% lesion  Left anterior descending coronary artery: ostial 30-40% lesion  Left circumflex coronary artery: Heavily calcified proximal   vessel with ostial 50-60% lesion, mid 50-60% lesion, OM2: normal   caliber vessel, heavily calcified with proximal 50-60% lesion,   OM3: 30-40% proximal lesion  Right coronary artery: heavily calcified vessel, minimal luminal   irregularities, RPDA: small < 2mm caliber vessel with 100%   proximal occlusion and grade II R to R collaterals from an RV   marginal.  Left ventriculogram: Normal wall motion, LVEF = 55%  LVEDP: 4 mmHg  Aortic pressure: 90/50 mmHg    Impression:   2 Vessel CAD  Normal LV function  Normal LVEDP  Normal systemic pressures     CARDIAC CATH: 21  Procedure Findings:  1. Severe multi vessel coronary artery diease              ~not amenable to PCI  2. Normal left ventricular function with EF estimated at 55-60%  3. Normal left heart hemodynamics    CAB21 DR Ajith Barnhart  OPERATION PERFORMED:  1. Urgent coronary bypass grafting surgery x3 with single greater  saphenous vein graft to the posterior ventricular branch of the right  coronary artery, separate single greater saphenous vein graft to the  second obtuse marginal branch of circumflex,  pedicled left internal artery to the LAD. 2.  Left atrial appendage obliteration with 40 mm AtriCure left atrial  clip. 3.  Cardiopulmonary bypass. 4.  Endoscopic vein harvest of the left greater saphenous vein. 5.  Transesophageal echo. 6.  Epiaortic ultrasound. 7.  Doppler verification of grafts. 8.  Bilateral five-level intercostal nerve block with Exparel. 9.  Platelet gel application. 10.  Sternal plating. 05/10/21 DR Rocha  OPERATION PERFORMED:  Mediastinal exploration and evacuation of  Hematoma.       Left heart cath Dr. Bruce Selby 2021  Procedure Note     Procedure:          [facility] with grafts  Indication:          UA, wma, depressed EF, severe MR     Procedure Details:  Consent Access Bleed R Sedat Start Stop Versed Fentanyl Contrast Fluoro EBL Comp Spec   Yes RCFA int MCSFC 1448 1541 1.5 75 195 12.5 <20 None None   *Sedation Note: MCSFC = minimal conscious sedation for comfort     Findings:  Artery Findings/Result   LM Normal   LAD Mid 100% after large diagonal, distal supplied by LIMA   Cx 99% ostial, distal supplied by R-L collaterals   RI N/A   RCA 70% distal, % prox   S-O occluded   S-PLB patent   L-LAD  patent   LVEDP 8   LVG NA due to contrast      Intervention:         None     Post Cath Dx:       Severe native disease as above  Occluded S-OM  Suspect severe MR related to papillary dysfunction related to occluded distal RCA and Cx  Consider MVR when wound issues resolve if MR does not improved with medical therapy  Would followup with Dr. Evi Ambrose        VASCULAR/OTHER IMAGING:      Assessment and Plan   Mark Arvizu is a 80 y.o. female who presents today for the following problems:      1. CAD:    - 5/10/2021 3V CABG   - 8/2021 closure of SVG-OM  2. Mitral regurg: mild (on ABHISHEK)  3.  Bilateral lower extremity edema   R>L due to vein harvesting  4. Mixed ischemic/nonischemic systolic cardiomyopathy: Improved!   - LVEF 30-35%-->50-55%   - LVEF normal prior to CABG then fell, suspect some severe MR and loss of SVG-OM added to loss of LVEF and fluctuations over angelica time as well as possible COVID  5. Mitral regurg: improved   - now mile-moderate  6. History of Covid 2021      MD Plan:  1. Continues to feel well following COVID resolution and now heart function significantly improved up to 50 to 55%. 2.  Actually having trouble with hypotension occasionally in the 90s and symptomatic.   -Continue daily torsemide but hold off on days where she might be more hypovolemic or dizzy. -If continues may reduce Aldactone as necessary  3. Swelling in the legs likely combination of venous insufficiency and history of SVT especially left greater than right  4. Well on half dose Entresto.    -Continue Lipitor, Plavix, Entresto, Aldactone, emadex   -No aspirin secondary to bruising      MDM: mod    Patient Active Problem List   Diagnosis    Rheumatoid arthritis ([facility]    Psoriasis    GERD (gastroesophageal reflux disease)    Anemia    Cylindrical bronchiectasis ([facility])    Tracheobronchomalacia    Immunocompromised state ([address]    Coronary artery disease    Bilateral lower extremity edema    Essential hypertension    Lumbar spondylosis    Mitral valve insufficiency and aortic valve insufficiency    Mixed hyperlipidemia    Myopia of both details independently gathered by my clinical support staff, while the remaining scribed note accurately describes my personal service to the patient. The above RN is working as a scribe for and in the presence of myself . Working as a scribe, the RN may have prepopulated components of this note with my historical intellectual property under my direct supervision. Any additions to this intellectual property were performed at my direction. Furthermore, the content and accuracy of this note have been reviewed by me to the best of my ability.

## 2022-08-04 ENCOUNTER — TELEPHONE (OUTPATIENT)
Dept: CARDIOLOGY CLINIC | Age: 72
End: 2022-08-04

## 2022-08-04 ENCOUNTER — OFFICE VISIT (OUTPATIENT)
Dept: CARDIOLOGY CLINIC | Age: 72
End: 2022-08-04
Payer: MEDICARE

## 2022-08-04 VITALS
WEIGHT: 171.5 LBS | SYSTOLIC BLOOD PRESSURE: 104 MMHG | OXYGEN SATURATION: 92 % | DIASTOLIC BLOOD PRESSURE: 50 MMHG | BODY MASS INDEX: 31.56 KG/M2 | HEART RATE: 70 BPM | HEIGHT: 62 IN

## 2022-08-04 DIAGNOSIS — R60.0 BILATERAL LEG EDEMA: ICD-10-CM

## 2022-08-04 DIAGNOSIS — I42.8 OTHER CARDIOMYOPATHY (HCC): ICD-10-CM

## 2022-08-04 DIAGNOSIS — I34.0 MITRAL VALVE INSUFFICIENCY, UNSPECIFIED ETIOLOGY: ICD-10-CM

## 2022-08-04 DIAGNOSIS — I25.10 CORONARY ARTERY DISEASE INVOLVING NATIVE CORONARY ARTERY OF NATIVE HEART WITHOUT ANGINA PECTORIS: Primary | ICD-10-CM

## 2022-08-04 PROCEDURE — 1123F ACP DISCUSS/DSCN MKR DOCD: CPT | Performed by: INTERNAL MEDICINE

## 2022-08-04 PROCEDURE — 99214 OFFICE O/P EST MOD 30 MIN: CPT | Performed by: INTERNAL MEDICINE

## 2022-08-04 NOTE — TELEPHONE ENCOUNTER
Spoke to pt to relay message. V/U     ----- Message from Ayan Hannah MD sent at 8/3/2022  6:47 PM EDT -----  Please let patient know that her heart function has significantly improved and is now nearly normal.  EF is 50 to 55% which is excellent news. Her mitral regurgitation appears improved also and is mild to moderate with mild aortic regurgitation.   No additional change at this time

## 2022-08-04 NOTE — PATIENT INSTRUCTIONS
Patient Plan:  1. Discussed echocardiogram test results from today as normal  2. I recommend that the patient continue their currently prescribed medications. Their drug modifiable risk factors appear to be well controlled. I will continue to address the need/dosing of medications in future visits. 3.  Discussed ways to decrease swelling   -Elevate your legs above your heart to decrease swelling   - Wear below the knee compression stockings during the day   - The more you walk the better  4. Continue to monitor your heart rate and blood pressure at home   - Call me if you feel light headed, dizzy or have syncope   - Let me know if you continue to have low BP and we will discuss adjusting medications   5. Discussed decreasing your torsemide to help manage your low blood pressure   - If your BP is low or you feel dizzy hold your torsemide for that day  6.  Follow up with me in 6 months

## 2022-08-08 ENCOUNTER — HOSPITAL ENCOUNTER (EMERGENCY)
Age: 72
Discharge: HOME | End: 2022-08-08
Payer: MEDICARE

## 2022-08-08 ENCOUNTER — TELEPHONE (OUTPATIENT)
Dept: PULMONOLOGY | Age: 72
End: 2022-08-08

## 2022-08-08 ENCOUNTER — APPOINTMENT (OUTPATIENT)
Dept: CT IMAGING | Age: 72
End: 2022-08-08
Payer: MEDICARE

## 2022-08-08 VITALS
OXYGEN SATURATION: 97 % | HEART RATE: 87 BPM | DIASTOLIC BLOOD PRESSURE: 58 MMHG | BODY MASS INDEX: 30.73 KG/M2 | TEMPERATURE: 97.4 F | WEIGHT: 167 LBS | RESPIRATION RATE: 14 BRPM | HEIGHT: 62 IN | SYSTOLIC BLOOD PRESSURE: 117 MMHG

## 2022-08-08 DIAGNOSIS — Z87.09 HISTORY OF BRONCHIECTASIS: ICD-10-CM

## 2022-08-08 DIAGNOSIS — R79.89 ELEVATED BRAIN NATRIURETIC PEPTIDE (BNP) LEVEL: ICD-10-CM

## 2022-08-08 DIAGNOSIS — I50.9 ACUTE ON CHRONIC CONGESTIVE HEART FAILURE, UNSPECIFIED HEART FAILURE TYPE (HCC): Primary | ICD-10-CM

## 2022-08-08 DIAGNOSIS — R06.02 SHORTNESS OF BREATH: ICD-10-CM

## 2022-08-08 LAB
A/G RATIO: 0.9 (ref 1.1–2.2)
ALBUMIN SERPL-MCNC: 3.8 G/DL (ref 3.4–5)
ALP BLD-CCNC: 75 U/L (ref 40–129)
ALT SERPL-CCNC: 10 U/L (ref 10–40)
ANION GAP SERPL CALCULATED.3IONS-SCNC: 13 MMOL/L (ref 3–16)
AST SERPL-CCNC: 13 U/L (ref 15–37)
BASOPHILS ABSOLUTE: 0 K/UL (ref 0–0.2)
BASOPHILS RELATIVE PERCENT: 0.4 %
BILIRUB SERPL-MCNC: 0.5 MG/DL (ref 0–1)
BUN BLDV-MCNC: 30 MG/DL (ref 7–20)
CALCIUM SERPL-MCNC: 9.4 MG/DL (ref 8.3–10.6)
CHLORIDE BLD-SCNC: 91 MMOL/L (ref 99–110)
CO2: 27 MMOL/L (ref 21–32)
CREAT SERPL-MCNC: 1.4 MG/DL (ref 0.6–1.2)
EOSINOPHILS ABSOLUTE: 0 K/UL (ref 0–0.6)
EOSINOPHILS RELATIVE PERCENT: 0.1 %
GFR AFRICAN AMERICAN: 45
GFR NON-AFRICAN AMERICAN: 37
GLUCOSE BLD-MCNC: 196 MG/DL (ref 70–99)
HCT VFR BLD CALC: 31.9 % (ref 36–48)
HEMOGLOBIN: 10.6 G/DL (ref 12–16)
INFLUENZA A: NOT DETECTED
INFLUENZA B: NOT DETECTED
LYMPHOCYTES ABSOLUTE: 0.4 K/UL (ref 1–5.1)
LYMPHOCYTES RELATIVE PERCENT: 8 %
MAGNESIUM: 2.2 MG/DL (ref 1.8–2.4)
MCH RBC QN AUTO: 30 PG (ref 26–34)
MCHC RBC AUTO-ENTMCNC: 33.4 G/DL (ref 31–36)
MCV RBC AUTO: 89.9 FL (ref 80–100)
MONOCYTES ABSOLUTE: 0.2 K/UL (ref 0–1.3)
MONOCYTES RELATIVE PERCENT: 4.7 %
NEUTROPHILS ABSOLUTE: 4.4 K/UL (ref 1.7–7.7)
NEUTROPHILS RELATIVE PERCENT: 86.8 %
PDW BLD-RTO: 14.5 % (ref 12.4–15.4)
PLATELET # BLD: 272 K/UL (ref 135–450)
PMV BLD AUTO: 7.3 FL (ref 5–10.5)
POTASSIUM SERPL-SCNC: 4 MMOL/L (ref 3.5–5.1)
PRO-BNP: 3742 PG/ML (ref 0–124)
RBC # BLD: 3.54 M/UL (ref 4–5.2)
SARS-COV-2 RNA, RT PCR: NOT DETECTED
SODIUM BLD-SCNC: 131 MMOL/L (ref 136–145)
TOTAL PROTEIN: 7.9 G/DL (ref 6.4–8.2)
WBC # BLD: 5.1 K/UL (ref 4–11)

## 2022-08-08 PROCEDURE — 36415 COLL VENOUS BLD VENIPUNCTURE: CPT

## 2022-08-08 PROCEDURE — 93005 ELECTROCARDIOGRAM TRACING: CPT | Performed by: PHYSICIAN ASSISTANT

## 2022-08-08 PROCEDURE — 85025 COMPLETE CBC W/AUTO DIFF WBC: CPT

## 2022-08-08 PROCEDURE — 87636 SARSCOV2 & INF A&B AMP PRB: CPT

## 2022-08-08 PROCEDURE — 6360000002 HC RX W HCPCS: Performed by: PHYSICIAN ASSISTANT

## 2022-08-08 PROCEDURE — 6370000000 HC RX 637 (ALT 250 FOR IP): Performed by: PHYSICIAN ASSISTANT

## 2022-08-08 PROCEDURE — 83735 ASSAY OF MAGNESIUM: CPT

## 2022-08-08 PROCEDURE — 71250 CT THORAX DX C-: CPT

## 2022-08-08 PROCEDURE — 96374 THER/PROPH/DIAG INJ IV PUSH: CPT

## 2022-08-08 PROCEDURE — 83880 ASSAY OF NATRIURETIC PEPTIDE: CPT

## 2022-08-08 PROCEDURE — 80053 COMPREHEN METABOLIC PANEL: CPT

## 2022-08-08 PROCEDURE — 99284 EMERGENCY DEPT VISIT MOD MDM: CPT

## 2022-08-08 RX ORDER — METHYLPREDNISOLONE SODIUM SUCCINATE 40 MG/ML
40 INJECTION, POWDER, LYOPHILIZED, FOR SOLUTION INTRAMUSCULAR; INTRAVENOUS DAILY
Status: DISCONTINUED | OUTPATIENT
Start: 2022-08-08 | End: 2022-08-09 | Stop reason: HOSPADM

## 2022-08-08 RX ORDER — IPRATROPIUM BROMIDE AND ALBUTEROL SULFATE 2.5; .5 MG/3ML; MG/3ML
1 SOLUTION RESPIRATORY (INHALATION)
Status: DISCONTINUED | OUTPATIENT
Start: 2022-08-08 | End: 2022-08-09 | Stop reason: HOSPADM

## 2022-08-08 RX ORDER — CEFUROXIME AXETIL 500 MG/1
500 TABLET ORAL 2 TIMES DAILY
Qty: 14 TABLET | Refills: 0 | Status: SHIPPED | OUTPATIENT
Start: 2022-08-08 | End: 2022-08-12

## 2022-08-08 RX ORDER — PREDNISONE 10 MG/1
TABLET ORAL
Qty: 30 TABLET | Refills: 0 | Status: SHIPPED | OUTPATIENT
Start: 2022-08-08 | End: 2022-08-20

## 2022-08-08 RX ORDER — ALBUTEROL SULFATE 90 UG/1
2 AEROSOL, METERED RESPIRATORY (INHALATION) EVERY 4 HOURS PRN
Qty: 1 EACH | Refills: 5 | Status: SHIPPED | OUTPATIENT
Start: 2022-08-08

## 2022-08-08 RX ADMIN — IPRATROPIUM BROMIDE AND ALBUTEROL SULFATE 1 AMPULE: 2.5; .5 SOLUTION RESPIRATORY (INHALATION) at 19:59

## 2022-08-08 RX ADMIN — METHYLPREDNISOLONE SODIUM SUCCINATE 40 MG: 40 INJECTION, POWDER, FOR SOLUTION INTRAMUSCULAR; INTRAVENOUS at 20:00

## 2022-08-08 ASSESSMENT — PAIN - FUNCTIONAL ASSESSMENT
PAIN_FUNCTIONAL_ASSESSMENT: NONE - DENIES PAIN
PAIN_FUNCTIONAL_ASSESSMENT: NONE - DENIES PAIN

## 2022-08-08 NOTE — TELEPHONE ENCOUNTER
Do you have the following symptoms? Shortness of Breath  yes  Wheezing  yes  Cough  yes  Cough Characteristics:  Productive    yes some  Sputum Color    green  Hemoptysis   no  Consistency of sputum   thick     Fever:    no    Temp:no  Chills/Sweats:  no  What other symptoms are you having?:  fatigue    How long have you had these symptoms? 6 days     Pharmacy:[facility]    Have you been vaccinated for covid? Yes  Have you received a booster vaccine? No          Review medications and allergies: Allergies? Allergies   Allergen Reactions    Atenolol Other (See Comments)     Cough               Currently on Antibiotics? (Drug/Dose/Frequency and how long on?) no        Systemic Steroids? (Drug/Dose/Frequency and how long on?) Prednisone 4mg daily         Last OV 6/1/22 with Dr. Vance Paredes   (pull in last visit note assessment/plan)     ASSESSMENT:   COPD/Chronic bronchitis/cough with cylindrical bronchiectasis with acute exacerbation   Centrilobular pulmonary emphysema  H/O COVID 65/72/34 complicated by MRSA pneumonia  Mild DANIELLE/REM related sleep disordered breathing - recently restarted CPAP with benefit  CAD s/p CABG 5/3/21  Tracheomalacia    Lower extremity edema  Pulmonary Nodules have resolved  Rheumatoid arthritis on Embrel and MTX and 4 mg prednisone  Positive tobacco history, 20 pack year quit in 1991  Diabetes     PLAN:   Prednisone taper and Ceftin   Continue azithromycin daily  Continue Daliresp; off Singulair  -- Failed Singulair, Failed Dulera, Failed Spiriva, Failed Advair. I recommended regular use of CPAP.      F/U will be with me in 4-6 months

## 2022-08-08 NOTE — ED PROVIDER NOTES
The Ekg interpreted by me shows  normal sinus rhythm with a rate of 75, PACs  Axis is   Left axis deviation  QTc is   prolonged at 482  Intervals and Durations are unremarkable.       ST Segments: nonspecific changes  No significant change from prior EKG dated 6/6/2022         Christofer Sanchez MD  08/08/22 1591

## 2022-08-08 NOTE — DISCHARGE INSTRUCTIONS
[address] [facility] Physician Orders and Discharge [address], [address]  [place], [facility]  Telephone: [phone]      Fax: [phone]        Your home care company:   Hong Ponce [phone]      Your wound-care supplies will be provided by: [facility] AT [facility] orders supplies through [facility]. Please note, depending on your insurance coverage, you may have out-of-pocket expenses for these supplies. Someone from [place] [place] may call you to confirm your order and discuss those potential costs before they ship your products -- please anticipate that call. If your out-of-pocket cost is going to be less than $200, and [facility] cannot reach you, they may ship your supplies as soon as the order is processed, and will send you a bill. If your out-of-pocket cost is going to be more than $200, [facility] should not ship your supplies until they speak with you. If you have any questions about your supplies or your potential out-of-pocket costs, or if you need to place an order for a refill of supplies (typically monthly), Storm Rice is your local representative, and can be reached at [phone]. Information on [facility]'s return policy will also be enclosed with your supplies -- please read that carefully before opening your items. NAME:  Rica Taveras   YOB: 1950  PRIMARY DIAGNOSIS FOR WOUND CARE CENTER:  Pressure ulcer     Wound cleansing:  Do not scrub or use excessive force. Wash hands with soap and water before and after dressing changes. Prior to applying a clean dressing, cleanse wound with normal saline, wound cleanser, or mild soap and water. Ask your physician or nurse before getting the wound(s) wet in the shower.                 Wound care for home:      Sacral Wound:  Betadine to amegan wound   Triad or Zinc Oxide to maegan-wound  Multidex Powder then Collagen with silver to wound bed & tuck into depth of wound and into undermining at 7-11 o'clock  Fluff gauze over wound  Cover with small sacral mepilex border  HHC to change dressing on Monday, Wednesday, Friday next week        Please note, all wounds (unless stated otherwise here) were mechanically debrided at the time of cleansing here in the wound-care center today, so a small amount of pain, drainage or bleeding from that process might be expected, and is normal.     All products for home use, including multiple products for a single wound if applicable, are medically necessary in order to achieve the best chance at timely wound healing. See provider documentation for details if needed. Substituted dressings applied in the [facility] today, if applicable:           New orders for this week (labs, imaging, medications, etc.):           Additional instructions for specific diagnoses:     +ROHO cushion- use at [address] when sitting!!  +True Balance Air group II mattress      General comments for pressure ulcers: *  Make sure you stay well-hydrated, and maintain good protein intake. *  Reposition at least every two hours, to keep from having pressure in one spot for too long. *  If you are not sure whether you have the best offloading surfaces (mattress, mattress overlay, chair cushion, heel-protector boots, etc), please ask. *  Moisturize your skin regularly with Vaseline, Aquaphor, Aveeno, CeraVe, Cetaphil, Eucerin, Lubriderm, etc; but keep the skin between your toes dry. *  If you smoke, your wound can not heal properly -- please talk with us when you're ready to quit. F/U Appointment is with Dr. Rani Grimaldo in 2 weeks on                                                at                       .     Your nurse  is Rush Valdivia RN     If we applied slip-resistant hospital socks today, be sure to remove them at least once a day to inspect your toes or feet, even if you're not changing the wraps or dressings underneath.  If you see anything concerning (redness, excess moisture, etc), please call and let us know right away.      Should you experience any significant changes in your wound(s) (including redness, increased warmth, increased pain, increased drainage, odor, or fever) or have questions about your wound care, please contact the [place] [address] at [phone] Monday-Thursday from 8:00 am - 4:30 pm, or Friday from 8:00 am - 2:30 pm.  If you need help with your wound outside these hours and cannot wait until we are again available, contact your home-care company (if applicable), your PCP, or go to the nearest emergency room

## 2022-08-09 LAB
EKG ATRIAL RATE: 75 BPM
EKG DIAGNOSIS: NORMAL
EKG P AXIS: 66 DEGREES
EKG P-R INTERVAL: 160 MS
EKG Q-T INTERVAL: 432 MS
EKG QRS DURATION: 114 MS
EKG QTC CALCULATION (BAZETT): 482 MS
EKG R AXIS: -17 DEGREES
EKG T AXIS: 74 DEGREES
EKG VENTRICULAR RATE: 75 BPM

## 2022-08-09 PROCEDURE — 93010 ELECTROCARDIOGRAM REPORT: CPT | Performed by: INTERNAL MEDICINE

## 2022-08-09 NOTE — ED PROVIDER NOTES
Emergency [facility] [email or web address] ED    Patient: Blayne Dominique  UPV:[id number]  : 1950  Date of Evaluation: 2022  ED ELIE Provider: JASON Park    Chief Complaint       Chief Complaint   Patient presents with    Shortness of Breath     C/O SOB, cough and 02 dropping to 66% RA on ambulation. No 02 at baseline. See's , was started on ABT and steroids. ABRAHAM STEEL was wearing a surgical mask for the entirety of this encounter. Does this patient come from an ECF, SNF, Rehab, Group Home or other Congregate setting: No  (If yes to above patient needs a Covid-19 test)    Blayne Dominique is a 80 y.o. female who presents to the emergency department for acute on chronic shortness of breath in the setting of known history of CHF. Patient also has history of bronchiectasis and was recently seen and evaluated by her PCP and provided a new inhaler as well as oral steroid therapy and an oral antibiotic empirically however when her shortness of breath continued to persist her daughter who is a registered nurse insisted that she present to the emergency department today. She presents for 6 out of 10 complaint of shortness of breath with some left-sided chest wall soreness and history of nonproductive cough over the course of the last several days without other radiation aggravating relieving factors    ROS:     Review of Systems  At least 10 systemsreviewed and otherwise acutely negative except as in the [address]     Past History     Past Medical History:   Diagnosis Date    Acute on chronic diastolic heart failure due to coronary artery disease (HCC)     Acute respiratory failure with hypoxia (HCC)     Atherosclerosis of native artery of right lower extremity with rest pain ([address] 2017    Back pain     Branch retinal vein occlusion 2012    Bronchiectasis with acute exacerbation (HCC)     Cellulitis and abscess of left leg 2021    Cellulitis of left lower extremity 7/11/2021    S/P vein harvest 05/2021 for CABG    CHF (congestive heart failure) ([facility])     Closed compression fracture of thoracic vertebra ([address] 01/15/2020    Closed fracture of facial bone with routine healing 11/21/2016    Closed jaw fracture ([address] 01/15/2020    Community acquired pneumonia of left lower lobe of lung     Compression fracture of L1 lumbar vertebra ([address] 01/15/2020    COPD (chronic obstructive pulmonary disease) ([facility])     COVID     Fracture of tibial plateau, closed, left, initial encounter 12/05/2017    HCAP (healthcare-associated pneumonia)     Minimally displaced zone I fracture of sacrum ([address] 09/02/2020    MRSA (methicillin resistant staph aureus) culture positive 07/2021    MRSA (methicillin resistant Staphylococcus aureus) 07/13/2021    wound    Mucus plugging of bronchi     NSTEMI (non-ST elevated myocardial infarction) ([address] 4/23/2021    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 Bone density test last year showed severe osteoporosis. Recently, two broken vertebrae (L1, L2) due to coughing. Diagnosed with tracheomalacia and stated she must cough very hard to clear phlegm. Was coughing due to upper respiratory infections which have been treated. Hx of laminectomy and recent kyphoplasty.    Refractured her jawbone which was previously repaired wi    Post herpetic neuralgia     Pressure ulcer of left heel, stage 3 ([address] 1/12/2022    Proximal humerus fracture 10/01/2019    Rheumatoid arthritis ([address]     Shingles 05/2020    Sleep apnea     Status post incision and drainage 07/2021    Left Leg    Surgical wound dehiscence, initial encounter (at [facility]G harvest site) 7/12/2021    Temporal arteritis ([address] 07/10/2013    Tobacco use 10/19/2017    Tracheomalacia     Vitreous hemorrhage, right eye ([address] 02/21/2020     Past Surgical History:   Procedure Laterality Date    ARTERY BIOPSY Right 2021    at [address]  2013    left temporal artery biopsy    BACK SURGERY  2020    BRONCHOSCOPY      BRONCHOSCOPY N/A 2019    BRONCHOSCOPY ALVEOLAR LAVAGE performed by Emilie Arellano MD at [address]  2021    CATARACT REMOVAL      COLONOSCOPY      CORONARY ARTERY BYPASS GRAFT N/A 2021    CORONARY ARTERY BYPASS X3 WITH LEFT ATRIAL APPENDAGE CLIP, 5 LEVEL BILATERAL INTERCOSTAL NERVE BLOCK, STERNAL PLATING performed by Campbell Steel MD at [address] CATH  2021    IR MIDLINE CATH 2021 [id number] [facility] SPECIAL PROCEDURES    KNEE ARTHROSCOPY      left    KYPHOSIS SURGERY      LAMINECTOMY      LEG SURGERY Left 2021    INCISION AND DEBRIDEMENT LEFT LOWER LEG WOUND WITH POSSIBLE WOUND VAC PLACEMENT performed by Dallas Clark MD at [facility]  2020    MEDIASTINOSCOPY N/A 2021    MEDIASTINAL EXPLORATION AND EVACUATION OF HEMATOMA performed by Campbell Steel MD at [place] [address]  2021    sacral wound debridement    PRESSURE ULCER DEBRIDEMENT N/A 2021    SACRAL WOUND DEBRIDEMENT performed by Stanislav Schroeder MD at [facility]  2013    FESS with balloon    SPINAL FUSION      TRANSESOPHAGEAL ECHOCARDIOGRAM  2021    TUBAL LIGATION      UPPER GASTROINTESTINAL ENDOSCOPY  2014    Dilitation     Social History     Socioeconomic History    Marital status:     Tobacco Use    Smoking status: Former     Packs/day: 1.00     Years: 20.00     Pack years: 20.00     Types: Cigarettes     Quit date: 1991     Years since quittin.3    Smokeless tobacco: Never   Vaping Use    Vaping Use: Never used   Substance and Sexual Activity    Alcohol use: Not Currently     Alcohol/week: 0.0 standard drinks Comment: rarely    Drug use: No       Medications/Allergies     Previous Medications    ACCU-CHEK CEDRICK PLUS STRIP    TEST 4 TIMES DAILY    ALBUTEROL SULFATE HFA (PROVENTIL;VENTOLIN;PROAIR) 108 (90 BASE) MCG/ACT INHALER    Inhale 2 puffs into the lungs every 4 hours as needed for Wheezing    ATORVASTATIN (LIPITOR) 40 MG TABLET    Take 40 mg by mouth    AZITHROMYCIN (ZITHROMAX) 250 MG TABLET    Take 1 tablet by mouth daily    CALCIUM CARBONATE (OSCAL) 500 MG TABS TABLET    Take 500 mg by mouth daily    CEFUROXIME (CEFTIN) 500 MG TABLET    Take 1 tablet by mouth in the morning and 1 tablet before bedtime. Do all this for 7 days. CETIRIZINE (ZYRTEC) 10 MG TABLET    Take 10 mg by mouth daily. CLOPIDOGREL (PLAVIX) 75 MG TABLET    Take 1 tablet by mouth daily    CYCLOBENZAPRINE (FLEXERIL) 10 MG TABLET    TAKE ONE TABLET BY MOUTH TWICE DAILY AS NEEDED    DALIRESP 500 MCG TABLET    TAKE ONE TABLET BY MOUTH EVERY DAY    DENOSUMAB SC    Inject into the skin    DOCUSATE SODIUM (COLACE) 100 MG CAPSULE    Take 100 mg by mouth 2 times daily as needed for Constipation    DULOXETINE (CYMBALTA) 60 MG EXTENDED RELEASE CAPSULE        ETANERCEPT (ENBREL SURECLICK) 50 MG/ML SOAJ    Inject 1 Adjustable Dose Pre-filled Pen Syringe into the skin once a week    FLUTICASONE (FLONASE) 50 MCG/ACT NASAL SPRAY    INHALE 2 SPRAYS IN EACH NOSTRIL DAILY    GABAPENTIN (NEURONTIN) 300 MG CAPSULE    Take 300 mg by mouth in the morning and at bedtime.      INSULIN GLARGINE (LANTUS) 100 UNIT/ML INJECTION VIAL    Inject 30 Units into the skin nightly    INSULIN LISPRO (HUMALOG) 100 UNIT/ML INJECTION VIAL    Inject 0-12 Units into the skin 3 times daily (with meals)    INSULIN SYRINGE-NEEDLE U-100 31G X 5/16\" 0.5 ML MISC    USE 5 TIMES DAILY    IPRATROPIUM (ATROVENT) 0.06 % NASAL SPRAY    USE 2 SPRAYS BY NASAL ROUTE 2-4 TIMES DAILY    LATANOPROST (XALATAN) 0.005 % OPHTHALMIC SOLUTION    Place 1 drop into both eyes nightly    MENTHOL, TOPICAL ANALGESIC, 10 % LIQD    Apply topically    METOPROLOL SUCCINATE (TOPROL XL) 25 MG EXTENDED RELEASE TABLET    Take 0.5 tablets by mouth daily    MISC. DEVICES (ACAPELLA) MISC    Take 1 Device by mouth as needed    MORPHINE (MS CONTIN) 15 MG EXTENDED RELEASE TABLET    15 mg 2 times daily. NARCAN 4 MG/0.1ML LIQD NASAL SPRAY    as needed     OMEPRAZOLE (PRILOSEC) 40 MG CAPSULE    Take 40 mg by mouth daily     ONDANSETRON (ZOFRAN) 4 MG TABLET    every 8 hours as needed     OXYCODONE-ACETAMINOPHEN (PERCOCET)  MG PER TABLET    Take 1 tablet by mouth daily. PREDNISONE (DELTASONE) 1 MG TABLET    Take 4 mg by mouth daily     PREDNISONE (DELTASONE) 10 MG TABLET    Take 4 tabs by mouth for three days, then 3 tabs by mouth for 3 days, then 2 tabs by mouth for 3 days, then 1 tab by mouth for 3 days.     SACUBITRIL-VALSARTAN (ENTRESTO) 24-26 MG PER TABLET    Take half tablet twice a day    SPIRONOLACTONE (ALDACTONE) 25 MG TABLET    Take 1 tablet by mouth daily    TORSEMIDE (DEMADEX) 20 MG TABLET    Take 2 tablets by mouth daily If weight 131 lbs or less, decrease to 20 mg daily    VITAMIN D (CHOLECALCIFEROL) 1000 UNIT TABS TABLET    Take 5,000 Units by mouth daily      Allergies   Allergen Reactions    Atenolol Other (See Comments)     Cough          Physical Exam       ED Triage Vitals   BP Temp Temp src Heart Rate Resp SpO2 Height Weight   08/08/22 1849 08/08/22 1849 -- 08/08/22 1849 08/08/22 1849 08/08/22 1849 08/08/22 1851 08/08/22 1851   (!) 103/55 97.4 °F (36.3 °C)  88 18 90 % 5' 2\" (1.575 m) 167 lb (75.8 kg)     Physical Exam  Noted mild relative hypotension per record review as well as patient report this is her normal blood pressure, non-tachycardic afebrile nontachypneic satting well on room air  Constitutional: Very pleasant 76-year-old female in no acute distress  Eyes:  PERRLA, EOMI x6, no nystagmus no photophobia  HENT:  Teeth and tongue intact, uvula midline, no PTA or retropharyngeal abscess noted no other intraoral lesion or edema appreciated patient tolerating secretions well, no stridor, no nuchal rigidity  Respiratory: Coarse breath sounds bilaterally with noted wheeze left greater than right as well as pleural rub noted on auscultation no crepitus or subcutaneous emphysema noted with palpation in the bilateral chest wall  Cardiovascular:  S1-S2, no S3  GI:  Abdomen soft nontender nondistended  :  Exam deferred for now no subjective complaints  Musculoskeletal:  Distally neurovascularly intact 2+ pulses normal capillary refill gross sensorimotor intact ×4 extremities  Integument:  Skin is warm well perfused  Lymphatic:  No significant lymphadenopathy appreciated  Neurologic:  Alert and oriented ×3, cranial nerves II through XII grossly intact as tested, patient able to ambulate, no ataxia, cauda equina, saddle anesthesia or bowel or bladder incontinence  Psychiatric:  Mood, behavior, judgment all within normal limits    SCREENINGS           Diagnostics   Labs:  Results for orders placed or performed during the hospital encounter of 08/08/22   COVID-19 & Influenza Combo    Specimen: Nasopharyngeal Swab   Result Value Ref Range    SARS-CoV-2 RNA, RT PCR NOT DETECTED NOT DETECTED    INFLUENZA A NOT DETECTED NOT DETECTED    INFLUENZA B NOT DETECTED NOT DETECTED   CBC with Auto Differential   Result Value Ref Range    WBC 5.1 4.0 - 11.0 K/uL    RBC 3.54 (L) 4.00 - 5.20 M/uL    Hemoglobin 10.6 (L) 12.0 - 16.0 g/dL    Hematocrit 31.9 (L) 36.0 - 48.0 %    MCV 89.9 80.0 - 100.0 fL    MCH 30.0 26.0 - 34.0 pg    MCHC 33.4 31.0 - 36.0 g/dL    RDW 14.5 12.4 - 15.4 %    Platelets 476 241 - 627 K/uL    MPV 7.3 5.0 - 10.5 fL    Neutrophils % 86.8 %    Lymphocytes % 8.0 %    Monocytes % 4.7 %    Eosinophils % 0.1 %    Basophils % 0.4 %    Neutrophils Absolute 4.4 1.7 - 7.7 K/uL    Lymphocytes Absolute 0.4 (L) 1.0 - 5.1 K/uL    Monocytes Absolute 0.2 0.0 - 1.3 K/uL    Eosinophils Absolute 0.0 0.0 - 0.6 K/uL    Basophils Absolute 0.0 0.0 - 0.2 K/uL   Comprehensive Metabolic Panel   Result Value Ref Range    Sodium 131 (L) 136 - 145 mmol/L    Potassium 4.0 3.5 - 5.1 mmol/L    Chloride 91 (L) 99 - 110 mmol/L    CO2 27 21 - 32 mmol/L    Anion Gap 13 3 - 16    Glucose 196 (H) 70 - 99 mg/dL    BUN 30 (H) 7 - 20 mg/dL    Creatinine 1.4 (H) 0.6 - 1.2 mg/dL    GFR Non- 37 (A) >60    GFR  45 (A) >60    Calcium 9.4 8.3 - 10.6 mg/dL    Total Protein 7.9 6.4 - 8.2 g/dL    Albumin 3.8 3.4 - 5.0 g/dL    Albumin/Globulin Ratio 0.9 (L) 1.1 - 2.2    Total Bilirubin 0.5 0.0 - 1.0 mg/dL    Alkaline Phosphatase 75 40 - 129 U/L    ALT 10 10 - 40 U/L    AST 13 (L) 15 - 37 U/L   Magnesium   Result Value Ref Range    Magnesium 2.20 1.80 - 2.40 mg/dL   Brain Natriuretic Peptide   Result Value Ref Range    Pro-BNP 3,742 (H) 0 - 124 pg/mL   EKG 12 Lead   Result Value Ref Range    Ventricular Rate 75 BPM    Atrial Rate 75 BPM    P-R Interval 160 ms    QRS Duration 114 ms    Q-T Interval 432 ms    QTc Calculation (Bazett) 482 ms    P Axis 66 degrees    R Axis -17 degrees    T Axis 74 degrees    Diagnosis       Sinus rhythm with Premature atrial complexesNonspecific ST and T wave abnormalityProlonged QTAbnormal ECGWhen compared with ECG of 06-JUN-2022 18:08,Premature atrial complexes are now PresentCriteria for Septal infarct are no longer Present     Radiographs:  CT CHEST WO CONTRAST    Result Date: 8/8/2022  EXAMINATION: CT OF THE CHEST WITHOUT CONTRAST 8/8/2022 7:55 pm TECHNIQUE: CT of the chest was performed without the administration of intravenous contrast. Multiplanar reformatted images are provided for review. Automated exposure control, iterative reconstruction, and/or weight based adjustment of the mA/kV was utilized to reduce the radiation dose to as low as reasonably achievable.  COMPARISON: CT PE protocol dated 12/13/2021 HISTORY: ORDERING SYSTEM PROVIDED HISTORY: Eval for acute shortness of breath with left-sided chest wall pain concerning for occult rib fracture please evaluate for acute process TECHNOLOGIST PROVIDED HISTORY: Reason for exam:->Eval for acute shortness of breath with left-sided chest wall pain concerning for occult rib fracture please evaluate for acute process Decision Support Exception - unselect if not a suspected or confirmed emergency medical condition->Emergency Medical Condition (MA) Reason for Exam: Pt c/o SOB FINDINGS: Mediastinum: Thyroid is homogeneous in attenuation. No bulky mediastinal adenopathy. Central airways are patent. Esophagus is normal course and caliber. Cardiac size within normal limits without pericardial effusion. Lungs/pleura: Consolidation of the left lower lung which appears atelectatic along with intervening lucencies concerning for small left pneumothorax along with intervening cavitary or varicoid bronchiectasis versus small left hydropneumothorax. Chronic changes including central bronchiectasis with upper lobe predominant mild-to-moderate centrilobular and paraseptal emphysema with biapical pleuroparenchymal scarring. No dominant nodule or mass lesion. Upper Abdomen: Visualized portions of the upper abdomen unremarkable. Soft Tissues/Bones: No acute osseous or soft tissue findings. No aggressive osseous lesion. Lobar atelectasis of the left lower lobe with intermixed components of fluid density and lucencies which could represent varicoid bronchiectasis intervening or necrotic components of airspace disease such as bronchopneumonia. Small left hydropneumothorax not excluded. This is developed in the interim from comparison 12/13/2021 CT. Findings are superimposed upon moderate bronchiectatic and emphysematous changes.           ED Course and MDM           In brief, Abiola Block is a 80 y.o. female who presented to the emergency department for evaluation of acute on chronic shortness of breath in setting of known history of bronchiectasis and CHF    Laboratory studies demonstrated very mild ISI as well as mild hyponatremia patient did have an elevated BNP in excess of 3000 patient was provided steroidal anti-inflammatory as well as breathing treatment for which she responded quite favorably to    Noncontrast CT of the chest to evaluate for possible occult fracture given her left-sided chest soreness as well as her chronic cough demonstrated no evidence of acute fracture malalignment or other bony abnormality involving the ribs. Noted chronic bronchiectasis changes of the lungs were noted with some mild subtle left-sided fluid not thought to represent hydropneumothorax    As stated above patient has already been empirically initiated on steroidal therapy as well as prescribed a new inhaler and a new course of antibiotics which her PCP feels is necessary secondary to her history of tracheomalacia as well as history of chronic mucus production in her upper airways    Patient as well as her daughter who is the registered nurse were both instructed on treatment care plan and verbalized understanding and agreement with treatment care plan    ED Medication Orders (From admission, onward)      Start Ordered     Status Ordering Provider    08/08/22 2000 08/08/22 1854  ipratropium-albuterol (DUONEB) nebulizer solution 1 ampule  EVERY 4 HOURS WHILE AWAKE        Question:  Initiate RT Bronchodilator Protocol  Answer:  Yes - ED protocol    Last MAR action: Given - by Sandra Crowder on 08/08/22 at [facility]. [facility] [place] [facility]    08/08/22 1900 08/08/22 1854  methylPREDNISolone sodium (SOLU-MEDROL) injection 40 mg  DAILY         Last MAR action: Given - by Sandra Crowder on 08/08/22 at [address], [address]            Final Impression      1. Acute on chronic congestive heart failure, unspecified heart failure type (HCC)    2. Elevated brain natriuretic peptide (BNP) level    3. History of bronchiectasis    4.  Shortness of breath        DISPOSITION Decision To Discharge 08/08/2022 09:27:30 PM     Patient seen independently with an Emergency Medicine attending available for supervision.     (Please note that portions of this note may have been completed with a voice recognition program. Efforts were made to edit the dictations but occasionally words aremis-transcribed.)    Mary Servin, [address] [place], [place]  08/08/22 1542

## 2022-08-12 ENCOUNTER — HOSPITAL ENCOUNTER (OUTPATIENT)
Dept: WOUND CARE | Age: 72
Discharge: HOME | End: 2022-08-12
Payer: MEDICARE

## 2022-08-12 VITALS
RESPIRATION RATE: 18 BRPM | HEART RATE: 90 BPM | BODY MASS INDEX: 25.39 KG/M2 | HEIGHT: 68 IN | SYSTOLIC BLOOD PRESSURE: 108 MMHG | TEMPERATURE: 98.9 F | DIASTOLIC BLOOD PRESSURE: 64 MMHG

## 2022-08-12 DIAGNOSIS — I87.2 VENOUS INSUFFICIENCY OF LEFT LOWER EXTREMITY: Primary | ICD-10-CM

## 2022-08-12 DIAGNOSIS — L89.154 PRESSURE ULCER OF COCCYGEAL REGION, STAGE 4 (HCC): ICD-10-CM

## 2022-08-12 DIAGNOSIS — L92.9 HYPERGRANULATION: ICD-10-CM

## 2022-08-12 PROCEDURE — 11042 DBRDMT SUBQ TIS 1ST 20SQCM/<: CPT

## 2022-08-12 PROCEDURE — 11042 DBRDMT SUBQ TIS 1ST 20SQCM/<: CPT | Performed by: INTERNAL MEDICINE

## 2022-08-12 RX ORDER — BACITRACIN ZINC AND POLYMYXIN B SULFATE 500; 1000 [USP'U]/G; [USP'U]/G
OINTMENT TOPICAL ONCE
Status: CANCELLED | OUTPATIENT
Start: 2022-08-12 | End: 2022-08-12

## 2022-08-12 RX ORDER — LIDOCAINE 40 MG/G
CREAM TOPICAL ONCE
Status: CANCELLED | OUTPATIENT
Start: 2022-08-12 | End: 2022-08-12

## 2022-08-12 RX ORDER — LIDOCAINE 40 MG/G
CREAM TOPICAL ONCE
Status: DISCONTINUED | OUTPATIENT
Start: 2022-08-12 | End: 2022-08-13 | Stop reason: HOSPADM

## 2022-08-12 RX ORDER — LIDOCAINE HYDROCHLORIDE 40 MG/ML
SOLUTION TOPICAL ONCE
Status: CANCELLED | OUTPATIENT
Start: 2022-08-12 | End: 2022-08-12

## 2022-08-12 RX ORDER — LIDOCAINE 50 MG/G
OINTMENT TOPICAL ONCE
Status: CANCELLED | OUTPATIENT
Start: 2022-08-12 | End: 2022-08-12

## 2022-08-12 ASSESSMENT — PAIN SCALES - GENERAL: PAINLEVEL_OUTOF10: 0

## 2022-08-12 NOTE — PLAN OF CARE
Patient wound slightly traumatized from more frequent sitting due to respiratory issues she has had over the last couple of weeks. Wound debrided and will continue with same dressing regime. Home care remains in place for intermittent wound care at home. Patient will try ot sit less once her breathing is improved. Follow up in wound clinic as ordered in 2 weeks. Discharge instructions reviewed with patient, all questions answered, copy given to patient. Dressings were applied to all wounds per M.D. Instructions at this visit.

## 2022-08-12 NOTE — PROGRESS NOTES
5/2020-Wound Length (cm): 0.9 cm    Wound 12/18/20 #2, Sacrum, Pressure Injury, Stage 4, Onset 5/2020-Wound Width (cm): 0.3 cm    Wound 12/18/20 #2, Sacrum, Pressure Injury, Stage 4, Onset 5/2020-Wound Depth (cm): 0.3 cm    Assessment:     Patient Active Problem List   Diagnosis Code    Rheumatoid arthritis ([facility] M06.9    Psoriasis L40.9    GERD (gastroesophageal reflux disease) K21.9    Anemia D64.9    Cylindrical bronchiectasis (HCC) J47.9    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    Immunocompromised state ([facility] D84.9    Coronary artery disease I25.10    Bilateral lower extremity edema R60.0    Essential hypertension I10    Lumbar spondylosis M47.816    Mitral valve insufficiency and aortic valve insufficiency I08.0    Mixed hyperlipidemia E78.2    Myopia of both eyes H52.13    Osteoporosis M81.0    Other chronic sinusitis J32.8    Primary open angle glaucoma (POAG) of both eyes, mild stage H40.1131    Primary osteoarthritis of right hip M16.11    Type 2 diabetes mellitus with unspecified diabetic retinopathy without macular edema ([facility]) E11.319    Type 2 diabetes mellitus with diabetic peripheral angiopathy without gangrene, with long-term current use of insulin ([facility]) E11.51, Z79.4    Pressure ulcer of coccygeal region, stage 4 ([facility]) L89.154    Mild malnutrition ([facility]) E44.1    Chronic diastolic congestive heart failure ([facility]) I50.32    Chronic obstructive pulmonary disease ([facility]) J44.9    Hypergranulation L92.9    Nonrheumatic mitral (valve) insufficiency I34.0    Chronic respiratory failure with hypoxia ([facility]) J96.11    Venous insufficiency of left lower extremity I87.2       Assessment of today's active condition(s): RA, decreased mobility, multiple hospital admissions, slowly healing stage 4 sacral pressure ulcer, no signs of infection; a bit more moist than I'd like it, but otherwise doing very well.  Factors contributing to occurrence and/or persistence of the chronic ulcer include diabetes, chronic pressure, decreased mobility, shear force, and immunosuppression. Medical necessity of today's visit is shown by the above documentation. Sharp debridement is indicated today, based upon the exam findings in the wound(s) above. Procedure note:     Consent obtained. Time out performed per [facility] policy. Anesthetic  Anesthetic: 4% Lidocaine Cream     Using a curette and scissors, I sharply debrided the coccyx ulcer(s) down through and including the removal of dermis. The type(s) of tissue debrided included fibrin, biofilm, necrotic/eschar, and hyperkeratosis . Total Surface Area Debrided: 1 sq cm. The ulcers were then irrigated with normal saline solution. The procedure was completed with a small amount of bleeding, and hemostasis was with pressure. The patient tolerated the procedure well, with no significant complications. The patient's level of pain during and after the procedure was monitored. Post-debridement measurements, if different from pre-debridement, are in the flowsheet as well. Discharge plan:     Treatment in the wound care center today, per RN documentation: Wound 12/18/20 #2, Sacrum, Pressure Injury, Stage 4, Onset 5/2020-Dressing/Treatment:  (add periwound Betadine now (can stop Vashe, then ZnO to periwound, collagen, gauze, sacral border). Keep focused on offloading, protein intake. Hopefully could be healed in the next month or so. Home treatment: good handwashing before and after any dressing changes. Cleanse wound with saline or soap & water before dressing change. May use Vaseline (petrolatum), Aquaphor, Aveeno, CeraVe, Cetaphil, Eucerin, Lubriderm, etc for dry skin. Dressing type for home: as above, three times weekly. Written discharge instructions given to patient. Follow up in 2 weeks, but call sooner with concerns or questions.     Electronically signed by Stefani Smalls MD on 8/12/2022 at 5:54 AM.

## 2022-08-12 NOTE — PROGRESS NOTES
215 [facility] Physician Orders and Discharge [address], [address]  [place], [facility]  Telephone: [phone]      Fax: [phone]        Your home care company:   [employer] [phone]      Your wound-care supplies will be provided by: [facility]. Please note, depending on your insurance coverage, you may have out-of-pocket expenses for these supplies. Someone from [place] may call you to confirm your order and discuss those potential costs before they ship your products -- please anticipate that call. If your out-of-pocket cost is going to be less than $200, and [facility] cannot reach you, they may ship your supplies as soon as the order is processed, and will send you a bill. If your out-of-pocket cost is going to be more than $200, [place] should not ship your supplies until they speak with you. If you have any questions about your supplies or your potential out-of-pocket costs, or if you need to place an order for a refill of supplies (typically monthly), Preeti Harper is your local representative, and can be reached at [phone]. Information on [facility]'s return policy will also be enclosed with your supplies -- please read that carefully before opening your items. NAME:  Do Taveras   YOB: 1950  PRIMARY DIAGNOSIS FOR WOUND CARE CENTER:  Pressure ulcer     Wound cleansing:  Do not scrub or use excessive force. Wash hands with soap and water before and after dressing changes. Prior to applying a clean dressing, cleanse wound with normal saline, wound cleanser, or mild soap and water. Ask your physician or nurse before getting the wound(s) wet in the shower.                 Wound care for home:      Sacral Wound:  Betadine to maegan wound   Triad or Zinc Oxide to maegan-wound  Multidex Powder then Collagen with silver to wound bed & tuck into depth of wound and into undermining at Rx for Augmentin sent to her pharmacy. Limit the hypertonic irrigations to twice daily. If she would like to rinse more than that, just use 1 salt packet (so we do not cause too much irritation). 7-11 o'clock  Fluff gauze over wound  Cover with small sacral mepilex border  C to change dressing on Monday, Wednesday, Friday next week        Please note, all wounds (unless stated otherwise here) were mechanically debrided at the time of cleansing here in the wound-care center today, so a small amount of pain, drainage or bleeding from that process might be expected, and is normal.     All products for home use, including multiple products for a single wound if applicable, are medically necessary in order to achieve the best chance at timely wound healing. See provider documentation for details if needed. Substituted dressings applied in the [address] [place] [address],[address] today, if applicable:           New orders for this week (labs, imaging, medications, etc.):           Additional instructions for specific diagnoses:     +ROHO cushion- use at [address] when sitting!!  +True Balance Air group II mattress      General comments for pressure ulcers: *  Make sure you stay well-hydrated, and maintain good protein intake. *  Reposition at least every two hours, to keep from having pressure in one spot for too long. *  If you are not sure whether you have the best offloading surfaces (mattress, mattress overlay, chair cushion, heel-protector boots, etc), please ask. *  Moisturize your skin regularly with Vaseline, Aquaphor, Aveeno, CeraVe, Cetaphil, Eucerin, Lubriderm, etc; but keep the skin between your toes dry. *  If you smoke, your wound can not heal properly -- please talk with us when you're ready to quit. F/U Appointment is with Dr. Justine Kennedy in 2 weeks on                                                at                       .     Your nurse  is Aleksandr Alva RN     If we applied slip-resistant hospital socks today, be sure to remove them at least once a day to inspect your toes or feet, even if you're not changing the wraps or dressings underneath.  If you see anything concerning (redness, excess moisture, etc), please call and let us know right away.      Should you experience any significant changes in your wound(s) (including redness, increased warmth, increased pain, increased drainage, odor, or fever) or have questions about your wound care, please contact the [place] [address] at [phone] Monday-Thursday from 8:00 am - 4:30 pm, or Friday from 8:00 am - 2:30 pm.  If you need help with your wound outside these hours and cannot wait until we are again available, contact your home-care company (if applicable), your PCP, or go to the nearest emergency room

## 2022-08-22 NOTE — DISCHARGE INSTRUCTIONS
215 [facility] Physician Orders and Discharge 800 [facility]  [address], [address]  [facility]  Telephone: [phone]      Fax: [phone]        Your home care company:   [employer] P[facility] [phone]      Your wound-care supplies will be provided by: [facility] orders supplies through [facility]. Please note, depending on your insurance coverage, you may have out-of-pocket expenses for these supplies. Someone from [place] may call you to confirm your order and discuss those potential costs before they ship your products -- please anticipate that call. If your out-of-pocket cost is going to be less than $200, and [facility] cannot reach you, they may ship your supplies as soon as the order is processed, and will send you a bill. If your out-of-pocket cost is going to be more than $200, [facility] should not ship your supplies until they speak with you. If you have any questions about your supplies or your potential out-of-pocket costs, or if you need to place an order for a refill of supplies (typically monthly), Jan Alvarez is your local representative, and can be reached at [phone]. Information on [place]'s return policy will also be enclosed with your supplies -- please read that carefully before opening your items. NAME:  Sky Taveras   YOB: 1950  PRIMARY DIAGNOSIS FOR WOUND CARE CENTER:  Pressure ulcer     Wound cleansing:  Do not scrub or use excessive force. Wash hands with soap and water before and after dressing changes. Prior to applying a clean dressing, cleanse wound with normal saline, wound cleanser, or mild soap and water. Ask your physician or nurse before getting the wound(s) wet in the shower.                 Wound care for home:      Sacral Wound:  Betadine to maegan wound   Triad or Zinc Oxide to maegan-wound  Multidex Powder then Collagen with silver to wound bed & tuck into depth of wound and into undermining Fluff gauze over wound  Cover with small sacral mepilex border  C to change dressing on Monday, Wednesday, Friday next week        Please note, all wounds (unless stated otherwise here) were mechanically debrided at the time of cleansing here in the wound-care center today, so a small amount of pain, drainage or bleeding from that process might be expected, and is normal.     All products for home use, including multiple products for a single wound if applicable, are medically necessary in order to achieve the best chance at timely wound healing. See provider documentation for details if needed. Substituted dressings applied in the [address] [place] [address],[address] today, if applicable:           New orders for this week (labs, imaging, medications, etc.):           Additional instructions for specific diagnoses:     +ROHO cushion- use at [address] when sitting!!  +True Balance Air group II mattress      General comments for pressure ulcers: *  Make sure you stay well-hydrated, and maintain good protein intake. *  Reposition at least every two hours, to keep from having pressure in one spot for too long. *  If you are not sure whether you have the best offloading surfaces (mattress, mattress overlay, chair cushion, heel-protector boots, etc), please ask. *  Moisturize your skin regularly with Vaseline, Aquaphor, Aveeno, CeraVe, Cetaphil, Eucerin, Lubriderm, etc; but keep the skin between your toes dry. *  If you smoke, your wound can not heal properly -- please talk with us when you're ready to quit. F/U Appointment is with Dr. Joanne Luna in 2 weeks on                                                at                       .     Your nurse  is Chasity Sanders RN     If we applied slip-resistant hospital socks today, be sure to remove them at least once a day to inspect your toes or feet, even if you're not changing the wraps or dressings underneath.  If you see anything concerning (redness, excess moisture, etc), please call and let us know right away.      Should you experience any significant changes in your wound(s) (including redness, increased warmth, increased pain, increased drainage, odor, or fever) or have questions about your wound care, please contact the [place] [address] at [phone] Monday-Thursday from 8:00 am - 4:30 pm, or Friday from 8:00 am - 2:30 pm.  If you need help with your wound outside these hours and cannot wait until we are again available, contact your home-care company (if applicable), your PCP, or go to the nearest emergency room

## 2022-08-22 NOTE — PROGRESS NOTES
[facility] Progress Note    Abiola Block     : 1950    DATE OF VISIT:  2022    Subjective:     Abiola Block is a 80 y.o. female who has a pressure ulcer located on the coccyx. Significant symptoms or pertinent wound history since last visit: for the first time in a long time, the coccyx ulcer is a bit more painful, a bit larger, we think because she's had more respiratory difficulty lately, so she's been up in her chair more, as opposed to lying down in bed, so offloading isn't as consistent as weeks ago. Modest drainage, no fever, nothing else new opened up. Additional ulcer(s) noted? no.      Her current medication list consists of Acapella, DULoxetine, Denosumab, Etanercept, Insulin Syringe-Needle U-100, Menthol (Topical Analgesic), Roflumilast, albuterol sulfate HFA, atorvastatin, azithromycin, blood glucose test strips, calcium carbonate, cetirizine, clopidogrel, cyclobenzaprine, docusate sodium, fluticasone, gabapentin, insulin glargine, insulin lispro, ipratropium, latanoprost, metoprolol succinate, morphine, naloxone, omeprazole, ondansetron, oxyCODONE-acetaminophen, predniSONE, sacubitril-valsartan, spironolactone, torsemide, and vitamin D.     Allergies: Atenolol    Objective:     Vitals:    22 1017   BP: 108/64   Pulse: 90   Resp: 18   Temp: 98.9 °F (37.2 °C)   TempSrc: Oral   Weight: Comment: no weight obtained today   Height: 5' 8\" (1.727 m)     AAOx3, fatigued, NAD  No cellulitis, angitis, fluctuance  No acute arthritis or bursitis  Milder LE edema now  No contact dermatitis or cutaneous Candidiasis  Blanca-ulcer skin: indurated, pink to light red, warm, mild maceration and some hyperkeratosis  Ulcer(s):  for the first time in a while, a bit worse this week -- slightly larger, less red and granular, more smooth and pink and inflamed, some fibrin and biofilm, a bit more epibole and undermining in a couple of spots, a bit of fat necrosis superiorly, this time, but should rebound again when she's more comfortable being in a flatter position again; no signs of infection. Factors contributing to occurrence and/or persistence of the chronic ulcer include diabetes, chronic pressure, decreased mobility, shear force, and immunosuppression. Medical necessity of today's visit is shown by the above documentation. Sharp debridement is indicated today, based upon the exam findings in the wound(s) above. Procedure note:     Consent obtained. Time out performed per [facility]. Anesthetic  Anesthetic: 4% Lidocaine Cream     Using a curette, #15 blade scalpel, and forceps, I sharply debrided the coccyx ulcer(s) down through and including the removal of subcutaneous tissue. The type(s) of tissue debrided included fibrin, biofilm, and necrotic/eschar. Total Surface Area Debrided: 1 sq cm. The ulcers were then irrigated with normal saline solution. The procedure was completed with a small amount of bleeding, and hemostasis was with pressure. The patient tolerated the procedure well, with no significant complications. The patient's level of pain during and after the procedure was monitored. Post-debridement measurements, if different from pre-debridement, are in the flowsheet as well. Discharge plan:     Treatment in the wound care center today, per RN documentation: Wound 12/18/20 #2, Sacrum, Pressure Injury, Stage 4, Onset 5/2020-Dressing/Treatment: Other (comment) (Betadine then Zinc oxide maegan-wound, Multidex, Puracol Ag, fluff gauze, sacral mepilex border). Keep up with protein intake and glucose control. When able, spend a bit more time back in bed, turning frequently from side to side. When she must sit more for respiratory reasons, meals, etc, better to sit very upright and forward, with weight on the ischia, rather than leaning or slumped back a bit, with weight on the sacrum. Home treatment: good handwashing before and after any dressing changes. Cleanse wound with saline or soap & water before dressing change. May use Vaseline (petrolatum), Aquaphor, Aveeno, CeraVe, Cetaphil, Eucerin, Lubriderm, etc for dry skin. Dressing type for home: as above, three times weekly. Written discharge instructions given to patient. Follow up in 2 weeks.     Electronically signed by Zaire Suarez MD on 8/22/2022 at 4:17 PM.

## 2022-08-24 NOTE — PROGRESS NOTES
[facility] Progress Note    Sandra Joseph     : 1950    DATE OF VISIT:  2020    Subjective:     Sandra Joseph is a 72 y.o. female who has a venous ulcer located on the right lower leg. Significant symptoms or pertinent wound history since last visit: doing well overall, leg ulcer making some progress toward healing, swelling is less; she's just wearing the SurePress , and doing fine. Met with Dr. Stephanie Horan, has OR excision of her pressure ulcer scheduled for next week. No F/C/D. Got her ROHO, and does feel more supported and offloaded when sitting on that. Additional ulcer(s) noted? Yes - the sacral-coccygeal pressure ulcer    Her current medication list consists of Acapella, DULoxetine, Insulin Syringe-Needle U-100, Roflumilast, Teriparatide (Recombinant), albuterol sulfate HFA, aspirin EC, atorvastatin, azithromycin, blood glucose test strips, calcium carbonate, cetirizine, cloNIDine, cyclobenzaprine, docusate sodium, etanercept, fluticasone, gabapentin, insulin glargine, insulin lispro, ipratropium, latanoprost, losartan, meloxicam, metoprolol succinate, morphine, naloxone, omeprazole, oxyCODONE-acetaminophen, polyethylene glycol, predniSONE, torsemide, and vitamin D. Allergies: Atenolol    Objective:     Vitals:    20 1022 20 1040   BP:  135/67   Pulse:  80   Resp: 18    Temp: 98.5 °F (36.9 °C)    TempSrc: Oral    Weight: 168 lb (76.2 kg)    Height: 5' 10\" (1.778 m)      AAOx3, fatigued, NAD  Intact distal pulses, foot warm, good cap refill  Milder LLE edema  No cellulitis, angitis, fluctuance, no allodynia at ulcers  No cutaneous Candidiasis, no contact dermatitis from dressings  Blanca-ulcer skin: healthy, warm and pink.   Ulcer(s): leg ulcer cluster smaller, a bit less depth, more granular, less fibrotic, some fibrinous necrosis and biofilm; pressure ulcer still with the very small skin opening, a bit of cloudy drainage, a good deal of depth and Prepped: right groin and right radial  Prepped with: ChloraPrep  The site was clipped  The patient was draped  undermining, no cellulitis. Photos also saved in electronic chart.     Today's wound measurements, per RN documentation:  Wound 12/18/20 #1, Right Medial Leg Cluster, Venous, Full Thickness, Onset 11/1/20-Wound Length (cm): 1 cm  Wound 12/18/20 #2, Sacrum, Pressure Injury, Stage 3, Onset 5/2020-Wound Length (cm): 0.9 cm    Wound 12/18/20 #1, Right Medial Leg Cluster, Venous, Full Thickness, Onset 11/1/20-Wound Width (cm): 2.4 cm  Wound 12/18/20 #2, Sacrum, Pressure Injury, Stage 3, Onset 5/2020-Wound Width (cm): 0.5 cm    Wound 12/18/20 #1, Right Medial Leg Cluster, Venous, Full Thickness, Onset 11/1/20-Wound Depth (cm): 0.2 cm  Wound 12/18/20 #2, Sacrum, Pressure Injury, Stage 3, Onset 5/2020-Wound Depth (cm): 1 cm    Assessment:     Patient Active Problem List   Diagnosis Code    Rheumatoid arthritis ([facility] M06.9    Psoriasis L40.9    GERD (gastroesophageal reflux disease) K21.9    Chronic normocytic anemia D64.9    Cylindrical bronchiectasis ([facility]) J47.9    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    Immunocompromised state ([facility] D84.9    CAD in native artery I25.10    Stasis edema of both lower extremities I87.303    Essential hypertension, benign I10    Lumbar spondylosis M47.816    Mitral valve insufficiency and aortic valve insufficiency I08.0    Mixed hyperlipidemia E78.2    Myopia of both eyes H52.13    Osteoporosis M81.0    Other chronic sinusitis J32.8    Primary open angle glaucoma (POAG) of both eyes, mild stage H40.1131    Primary osteoarthritis of right hip M16.11    Type 2 diabetes mellitus with diabetic polyneuropathy, with long-term current use of insulin ([facility]) E11.42, Z79.4    Type 2 diabetes mellitus with unspecified diabetic retinopathy without macular edema ([facility]) E11.319    Uncontrolled type 2 diabetes mellitus with diabetic peripheral angiopathy without gangrene, with long-term current use of insulin ([facility]) E11.51, E11.65, Z79.4    Pressure ulcer of coccygeal region, stage 3 ([address] D87.401  Ulcer of right leg, with fat layer exposed [facility] L97.929       Assessment of today's active condition(s): venous stasis, chronic leg edema, Hx of traumatic wound to LLE, evolved to nonhealing venous leg ulcer, making some progress with current Rx plan. Also a stage 3 (as best I can tell) pressure ulcer of the sacral-coccygeal region with a tiny skin opening but a good deal of undermining, in need of OR excision to start the healing process. Potential risk of osteomyelitis there - I can't feel bone now, and a CT was negative for findings of osteo back in August, but that's been quite a while, and I think we'll know more after visualizing the entire wound bed. Factors contributing to occurrence and/or persistence of the chronic ulcer include edema, venous stasis, chronic pressure, decreased mobility, shear force, obesity and immunosuppression. Medical necessity of today's visit is shown by the above documentation. Sharp debridement is indicated today, based upon the exam findings in the wound(s) above. Procedure note:     Consent obtained. Time out performed per [facility] policy. Anesthetic  Anesthetic: 4% Lidocaine Cream     Using a curette, I sharply debrided the right lower leg ulcer(s) down through and including the removal of dermis. The type(s) of tissue debrided included fibrin, biofilm and necrotic/eschar. Total Surface Area Debrided: 2 sq cm. The ulcers were then irrigated with normal saline solution. The procedure was completed with a small amount of bleeding, and hemostasis was with pressure. The patient tolerated the procedure well, with no significant complications. The patient's level of pain during and after the procedure was monitored. Post-debridement measurements, if different from pre-debridement, are in the flowsheet as well.     Discharge plan:     Treatment in the wound care center today, per RN documentation: Wound 12/18/20 #1, Right Medial Leg Cluster, Venous, Full Thickness, Onset 11/1/20-Dressing/Treatment: (Puracol Ag,Mepilex border,cotton Stockigette,Surepress,stock)  Wound 12/18/20 #2, Sacrum, Pressure Injury, Stage 3, Onset 5/2020-Dressing/Treatment: (Iodoform,Mepilex border). I reminded the patient of the importance of weight management and smoking cessation, if applicable; also encouraged ambulation as tolerated, additional lower extremity exercises as instructed in our education sheet, leg elevation when at rest, and compliance with any recommended dietary, diuretic and compression therapies. Await OR excision of pressure ulcer next week. Pre-op COVID test on Monday, she tells me. Continue to use ROHO cushion whenever possible, keep properly inflated, change positions frequently, push protein intake. Home treatment: good handwashing before and after any dressing changes. Cleanse wound with saline or soap & water before dressing change. May use Vaseline (petrolatum), Aquaphor, Aveeno, CeraVe, Cetaphil, Eucerin, Lubriderm, etc for dry skin. Dressing type for home: generally as above (can vary secondary dressing as needed), every day or two, as needed. Written discharge instructions given to patient. Follow up in 2 weeks, but call sooner with any significant concerns or questions.     Electronically signed by Naz Hidalgo MD on 1/4/2021 at 10:56 AM.

## 2022-08-26 ENCOUNTER — HOSPITAL ENCOUNTER (OUTPATIENT)
Dept: WOUND CARE | Age: 72
Discharge: HOME | End: 2022-08-26
Payer: MEDICARE

## 2022-08-26 VITALS
TEMPERATURE: 98.6 F | SYSTOLIC BLOOD PRESSURE: 120 MMHG | RESPIRATION RATE: 18 BRPM | DIASTOLIC BLOOD PRESSURE: 68 MMHG | HEART RATE: 78 BPM

## 2022-08-26 DIAGNOSIS — I87.2 VENOUS INSUFFICIENCY OF LEFT LOWER EXTREMITY: Primary | ICD-10-CM

## 2022-08-26 DIAGNOSIS — L92.9 HYPERGRANULATION: ICD-10-CM

## 2022-08-26 DIAGNOSIS — L89.154 PRESSURE ULCER OF COCCYGEAL REGION, STAGE 4 (HCC): ICD-10-CM

## 2022-08-26 PROCEDURE — 97597 DBRDMT OPN WND 1ST 20 CM/<: CPT | Performed by: INTERNAL MEDICINE

## 2022-08-26 PROCEDURE — 97597 DBRDMT OPN WND 1ST 20 CM/<: CPT

## 2022-08-26 RX ORDER — LIDOCAINE 50 MG/G
OINTMENT TOPICAL ONCE
Status: CANCELLED | OUTPATIENT
Start: 2022-08-26 | End: 2022-08-26

## 2022-08-26 RX ORDER — LIDOCAINE 40 MG/G
CREAM TOPICAL ONCE
Status: CANCELLED | OUTPATIENT
Start: 2022-08-26 | End: 2022-08-26

## 2022-08-26 RX ORDER — BACITRACIN ZINC AND POLYMYXIN B SULFATE 500; 1000 [USP'U]/G; [USP'U]/G
OINTMENT TOPICAL ONCE
Status: CANCELLED | OUTPATIENT
Start: 2022-08-26 | End: 2022-08-26

## 2022-08-26 RX ORDER — LIDOCAINE HYDROCHLORIDE 40 MG/ML
SOLUTION TOPICAL ONCE
Status: CANCELLED | OUTPATIENT
Start: 2022-08-26 | End: 2022-08-26

## 2022-08-26 RX ORDER — LIDOCAINE 40 MG/G
CREAM TOPICAL ONCE
Status: DISCONTINUED | OUTPATIENT
Start: 2022-08-26 | End: 2022-08-27 | Stop reason: HOSPADM

## 2022-08-26 ASSESSMENT — PAIN SCALES - GENERAL: PAINLEVEL_OUTOF10: 0

## 2022-08-26 NOTE — PLAN OF CARE
No changes in overall wound care regime. Wound debrided and MD is please with progression. Reminded patient to stay diligent with offloading and nutrition. Discharge instructions reviewed with patient, all questions answered, copy given to patient. Dressings were applied to all wounds per M.D. Instructions at this visit.

## 2022-08-26 NOTE — PROGRESS NOTES
215 [facility] Physician Orders and Discharge [address], [address]  [place], [facility]  Telephone: [phone]      Fax: [phone]        Your home care company:   [employer] [phone]      Your wound-care supplies will be provided by: [facility] AT [facility] orders supplies through [facility]. Please note, depending on your insurance coverage, you may have out-of-pocket expenses for these supplies. Someone from [place] may call you to confirm your order and discuss those potential costs before they ship your products -- please anticipate that call. If your out-of-pocket cost is going to be less than $200, and [facility] cannot reach you, they may ship your supplies as soon as the order is processed, and will send you a bill. If your out-of-pocket cost is going to be more than $200, [place] should not ship your supplies until they speak with you. If you have any questions about your supplies or your potential out-of-pocket costs, or if you need to place an order for a refill of supplies (typically monthly), Preeti Harper is your local representative, and can be reached at [phone]. Information on [facility]'s return policy will also be enclosed with your supplies -- please read that carefully before opening your items. NAME:  Do Taveras   YOB: 1950  PRIMARY DIAGNOSIS FOR WOUND CARE CENTER:  Pressure ulcer     Wound cleansing:  Do not scrub or use excessive force. Wash hands with soap and water before and after dressing changes. Prior to applying a clean dressing, cleanse wound with normal saline, wound cleanser, or mild soap and water. Ask your physician or nurse before getting the wound(s) wet in the shower.                 Wound care for home:      Sacral Wound:  Betadine to maegan wound   Triad or Zinc Oxide to maegan-wound  Multidex Powder then Collagen with silver to wound bed & tuck into depth of wound and into undermining Fluff gauze over wound  Cover with small sacral mepilex border  C to change dressing on Monday, Wednesday, Friday next week        Please note, all wounds (unless stated otherwise here) were mechanically debrided at the time of cleansing here in the wound-care center today, so a small amount of pain, drainage or bleeding from that process might be expected, and is normal.     All products for home use, including multiple products for a single wound if applicable, are medically necessary in order to achieve the best chance at timely wound healing. See provider documentation for details if needed. Substituted dressings applied in the [facility] today, if applicable:           New orders for this week (labs, imaging, medications, etc.):           Additional instructions for specific diagnoses:     +ROHO cushion- use at [address] when sitting!!  +True Balance Air group II mattress      General comments for pressure ulcers: *  Make sure you stay well-hydrated, and maintain good protein intake. *  Reposition at least every two hours, to keep from having pressure in one spot for too long. *  If you are not sure whether you have the best offloading surfaces (mattress, mattress overlay, chair cushion, heel-protector boots, etc), please ask. *  Moisturize your skin regularly with Vaseline, Aquaphor, Aveeno, CeraVe, Cetaphil, Eucerin, Lubriderm, etc; but keep the skin between your toes dry. *  If you smoke, your wound can not heal properly -- please talk with us when you're ready to quit. F/U Appointment is with Dr. Marian Duarte in 2 weeks on                                                at                       .     Your nurse  is Anastacio Mai RN     If we applied slip-resistant hospital socks today, be sure to remove them at least once a day to inspect your toes or feet, even if you're not changing the wraps or dressings underneath.  If you see anything concerning (redness, excess moisture, etc), please call and let us know right away.      Should you experience any significant changes in your wound(s) (including redness, increased warmth, increased pain, increased drainage, odor, or fever) or have questions about your wound care, please contact the [facility] at [phone] Monday-Thursday from 8:00 am - 4:30 pm, or Friday from 8:00 am - 2:30 pm.  If you need help with your wound outside these hours and cannot wait until we are again available, contact your home-care company (if applicable), your PCP, or go to the nearest emergency room

## 2022-09-01 NOTE — PROGRESS NOTES
Width (cm): 0.5 cm    Wound 12/18/20 #2, Sacrum, Pressure Injury, Stage 4, Onset 5/2020-Wound Depth (cm): 0.7 cm    Assessment:     Patient Active Problem List   Diagnosis Code    Rheumatoid arthritis ([facility] M06.9    Psoriasis L40.9    GERD (gastroesophageal reflux disease) K21.9    Anemia D64.9    Cylindrical bronchiectasis ([facility]) J47.9    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    Immunocompromised state ([facility] D84.9    Coronary artery disease I25.10    Bilateral lower extremity edema R60.0    Essential hypertension I10    Lumbar spondylosis M47.816    Mitral valve insufficiency and aortic valve insufficiency I08.0    Mixed hyperlipidemia E78.2    Myopia of both eyes H52.13    Osteoporosis M81.0    Other chronic sinusitis J32.8    Primary open angle glaucoma (POAG) of both eyes, mild stage H40.1131    Primary osteoarthritis of right hip M16.11    Type 2 diabetes mellitus with unspecified diabetic retinopathy without macular edema ([facility]) E11.319    Type 2 diabetes mellitus with diabetic peripheral angiopathy without gangrene, with long-term current use of insulin ([facility]) E11.51, Z79.4    Pressure ulcer of coccygeal region, stage 4 ([facility]) L89.154    Mild malnutrition ([facility]) E44.1    Chronic diastolic congestive heart failure ([facility]) I50.32    Chronic obstructive pulmonary disease ([facility]) J44.9    Hypergranulation L92.9    Nonrheumatic mitral (valve) insufficiency I34.0    Chronic respiratory failure with hypoxia ([facility]) J96.11    Venous insufficiency of left lower extremity I87.2       Assessment of today's active condition(s): RA, decreased mobility, a number of hospitalizations the last year or two, slowly healing stage 4 coccyx pressure ulcer. Factors contributing to occurrence and/or persistence of the chronic ulcer include diabetes, chronic pressure, decreased mobility, shear force, and immunosuppression. Medical necessity of today's visit is shown by the above documentation.  Sharp debridement is indicated today, based upon the exam findings in the wound(s) above. Procedure note:     Consent obtained. Time out performed per [facility]. Anesthetic  Anesthetic: 4% Lidocaine Cream     Using a curette, I sharply debrided the coccyx ulcer(s) down through and including the removal of dermis. The type(s) of tissue debrided included fibrin and biofilm. Total Surface Area Debrided: 1 sq cm. The ulcers were then irrigated with normal saline solution. The procedure was completed with a small amount of bleeding, and hemostasis was with pressure. The patient tolerated the procedure well, with no significant complications. The patient's level of pain during and after the procedure was monitored. Post-debridement measurements, if different from pre-debridement, are in the flowsheet as well. Discharge plan:     Treatment in the wound care center today, per RN documentation: Wound 12/18/20 #2, Sacrum, Pressure Injury, Stage 4, Onset 5/2020-Dressing/Treatment: Other (comment) (Betadine-maegan wound,Triad maegan-wound,Multidex Powder,Collagen,Fluff gauze,sacral mepilex border). Keep up the focus with protein intake, glucose control, especially offloading. Home treatment: good handwashing before and after any dressing changes. Cleanse wound with saline or soap & water before dressing change. May use Vaseline (petrolatum), Aquaphor, Aveeno, CeraVe, Cetaphil, Eucerin, Lubriderm, etc for dry skin. Dressing type for home:  Vashe, then as above , three times weekly. Written discharge instructions given to patient. Follow up in 2 weeks.     Electronically signed by Lester Ware MD on 9/1/2022 at 9:24 AM.

## 2022-09-06 DIAGNOSIS — R60.9 EDEMA, UNSPECIFIED TYPE: ICD-10-CM

## 2022-09-06 DIAGNOSIS — I50.32 CHRONIC DIASTOLIC CONGESTIVE HEART FAILURE (HCC): ICD-10-CM

## 2022-09-07 NOTE — DISCHARGE INSTRUCTIONS
1909 [facility] Physician Orders and Discharge 800 [facility]  [address], [address]  [place], [facility]  Telephone: [phone]      Fax: [phone]        Your home care company:   [employer] [phone]      Your wound-care supplies will be provided by: [facility] AT [facility] orders supplies through [facility]. Please note, depending on your insurance coverage, you may have out-of-pocket expenses for these supplies. Someone from [place] may call you to confirm your order and discuss those potential costs before they ship your products -- please anticipate that call. If your out-of-pocket cost is going to be less than $200, and [facility] cannot reach you, they may ship your supplies as soon as the order is processed, and will send you a bill. If your out-of-pocket cost is going to be more than $200, [facility] should not ship your supplies until they speak with you. If you have any questions about your supplies or your potential out-of-pocket costs, or if you need to place an order for a refill of supplies (typically monthly), Sung Coleman is your local representative, and can be reached at [phone]. Information on [facility]'s return policy will also be enclosed with your supplies -- please read that carefully before opening your items. NAME:  Job Taveras   YOB: 1950  PRIMARY DIAGNOSIS FOR WOUND CARE CENTER:  Pressure ulcer     Wound cleansing:  Do not scrub or use excessive force. Wash hands with soap and water before and after dressing changes. Prior to applying a clean dressing, cleanse wound with normal saline, wound cleanser, or mild soap and water. Ask your physician or nurse before getting the wound(s) wet in the shower.                 Wound care for home:      Sacral Wound:  Betadine to maegan wound   Triad or Zinc Oxide to maegan-wound  Multidex Powder then Collagen with silver to wound bed & tuck into depth of wound and into undermining Fluff gauze over wound  Cover with small sacral mepilex border  C to change dressing on Monday, Wednesday, Friday next week        Please note, all wounds (unless stated otherwise here) were mechanically debrided at the time of cleansing here in the wound-care center today, so a small amount of pain, drainage or bleeding from that process might be expected, and is normal.     All products for home use, including multiple products for a single wound if applicable, are medically necessary in order to achieve the best chance at timely wound healing. See provider documentation for details if needed. Substituted dressings applied in the [facility] today, if applicable:           New orders for this week (labs, imaging, medications, etc.):           Additional instructions for specific diagnoses:     +ROHO cushion- use at [address] when sitting!!  +True Balance Air group II mattress      General comments for pressure ulcers: *  Make sure you stay well-hydrated, and maintain good protein intake. *  Reposition at least every two hours, to keep from having pressure in one spot for too long. *  If you are not sure whether you have the best offloading surfaces (mattress, mattress overlay, chair cushion, heel-protector boots, etc), please ask. *  Moisturize your skin regularly with Vaseline, Aquaphor, Aveeno, CeraVe, Cetaphil, Eucerin, Lubriderm, etc; but keep the skin between your toes dry. *  If you smoke, your wound can not heal properly -- please talk with us when you're ready to quit. F/U Appointment is with Dr. Yvrose Herring in 2 weeks on                                                at                       .     Your nurse  is Nichole Oneill RN     If we applied slip-resistant hospital socks today, be sure to remove them at least once a day to inspect your toes or feet, even if you're not changing the wraps or dressings underneath.  If you see anything concerning (redness, excess moisture, etc), please call and let us know right away.      Should you experience any significant changes in your wound(s) (including redness, increased warmth, increased pain, increased drainage, odor, or fever) or have questions about your wound care, please contact the [address] [place] [address] at [phone] Monday-Thursday from 8:00 am - 4:30 pm, or Friday from 8:00 am - 2:30 pm.  If you need help with your wound outside these hours and cannot wait until we are again available, contact your home-care company (if applicable), your PCP, or go to the nearest emergency room

## 2022-09-09 ENCOUNTER — HOSPITAL ENCOUNTER (OUTPATIENT)
Dept: WOUND CARE | Age: 72
Discharge: HOME | End: 2022-09-09
Payer: MEDICARE

## 2022-09-09 VITALS
RESPIRATION RATE: 16 BRPM | DIASTOLIC BLOOD PRESSURE: 62 MMHG | WEIGHT: 166.3 LBS | SYSTOLIC BLOOD PRESSURE: 104 MMHG | BODY MASS INDEX: 25.29 KG/M2 | HEART RATE: 89 BPM | TEMPERATURE: 98.6 F

## 2022-09-09 DIAGNOSIS — I87.2 VENOUS INSUFFICIENCY OF LEFT LOWER EXTREMITY: Primary | ICD-10-CM

## 2022-09-09 DIAGNOSIS — L89.154 PRESSURE ULCER OF COCCYGEAL REGION, STAGE 4 (HCC): ICD-10-CM

## 2022-09-09 DIAGNOSIS — L92.9 HYPERGRANULATION: ICD-10-CM

## 2022-09-09 PROCEDURE — 97597 DBRDMT OPN WND 1ST 20 CM/<: CPT | Performed by: INTERNAL MEDICINE

## 2022-09-09 PROCEDURE — 97597 DBRDMT OPN WND 1ST 20 CM/<: CPT

## 2022-09-09 RX ORDER — BACITRACIN ZINC AND POLYMYXIN B SULFATE 500; 1000 [USP'U]/G; [USP'U]/G
OINTMENT TOPICAL ONCE
Status: CANCELLED | OUTPATIENT
Start: 2022-09-09 | End: 2022-09-09

## 2022-09-09 RX ORDER — LIDOCAINE 40 MG/G
CREAM TOPICAL ONCE
Status: CANCELLED | OUTPATIENT
Start: 2022-09-09 | End: 2022-09-09

## 2022-09-09 RX ORDER — LIDOCAINE 50 MG/G
OINTMENT TOPICAL ONCE
Status: CANCELLED | OUTPATIENT
Start: 2022-09-09 | End: 2022-09-09

## 2022-09-09 RX ORDER — LIDOCAINE HYDROCHLORIDE 40 MG/ML
SOLUTION TOPICAL ONCE
Status: CANCELLED | OUTPATIENT
Start: 2022-09-09 | End: 2022-09-09

## 2022-09-09 RX ORDER — LIDOCAINE 40 MG/G
CREAM TOPICAL ONCE
Status: DISCONTINUED | OUTPATIENT
Start: 2022-09-09 | End: 2022-09-10 | Stop reason: HOSPADM

## 2022-09-09 ASSESSMENT — PAIN SCALES - GENERAL: PAINLEVEL_OUTOF10: 0

## 2022-09-09 NOTE — PLAN OF CARE
Patient seen in [address] [place] [address] today for follow up. Patient wound showing signs  of improvement. Wound debridement per Dr. Raven Walters today. Patient educated on need for offloading and nutrition. Patient voice understanding. No changes in dressing regimen today. F/u in [address] [place] [address],[address] in 2 weeks as ordered, pt. Aware to call sooner with any changes or questions/concerns. Discharge instructions reviewed with patient, all questions answered, copy given to patient. Dressings were applied to all wounds per M.D. Instructions at this visit.

## 2022-09-09 NOTE — PROGRESS NOTES
215 [facility] Physician Orders and Discharge 800 [facility]  [address], [address]  [place], [facility]  Telephone: [phone]      Fax: [phone]        Your home care company:   [employer] Ponce [phone]      Your wound-care supplies will be provided by: [employer] orders supplies through [facility]. Please note, depending on your insurance coverage, you may have out-of-pocket expenses for these supplies. Someone from [place] may call you to confirm your order and discuss those potential costs before they ship your products -- please anticipate that call. If your out-of-pocket cost is going to be less than $200, and [facility] cannot reach you, they may ship your supplies as soon as the order is processed, and will send you a bill. If your out-of-pocket cost is going to be more than $200, [facility] should not ship your supplies until they speak with you. If you have any questions about your supplies or your potential out-of-pocket costs, or if you need to place an order for a refill of supplies (typically monthly), Jesi Lackey is your local representative, and can be reached at [phone]. Information on [facility]'s return policy will also be enclosed with your supplies -- please read that carefully before opening your items. NAME:  Karly Taveras   YOB: 1950  PRIMARY DIAGNOSIS FOR WOUND CARE CENTER:  Pressure ulcer     Wound cleansing:  Do not scrub or use excessive force. Wash hands with soap and water before and after dressing changes. Prior to applying a clean dressing, cleanse wound with normal saline, wound cleanser, or mild soap and water. Ask your physician or nurse before getting the wound(s) wet in the shower.                 Wound care for home:      Sacral Wound:  Betadine to maegan wound   Triad or Zinc Oxide to maegan-wound  Multidex Powder then Collagen with silver to wound bed & tuck into depth of wound and into undermining Fluff gauze over wound  Cover with small sacral mepilex border  C to change dressing on Monday, Wednesday, Friday next week        Please note, all wounds (unless stated otherwise here) were mechanically debrided at the time of cleansing here in the wound-care center today, so a small amount of pain, drainage or bleeding from that process might be expected, and is normal.     All products for home use, including multiple products for a single wound if applicable, are medically necessary in order to achieve the best chance at timely wound healing. See provider documentation for details if needed. Substituted dressings applied in the [facility] today, if applicable:           New orders for this week (labs, imaging, medications, etc.):           Additional instructions for specific diagnoses:     +ROHO cushion- use at [address] when sitting!!  +True Balance Air group II mattress      General comments for pressure ulcers: *  Make sure you stay well-hydrated, and maintain good protein intake. *  Reposition at least every two hours, to keep from having pressure in one spot for too long. *  If you are not sure whether you have the best offloading surfaces (mattress, mattress overlay, chair cushion, heel-protector boots, etc), please ask. *  Moisturize your skin regularly with Vaseline, Aquaphor, Aveeno, CeraVe, Cetaphil, Eucerin, Lubriderm, etc; but keep the skin between your toes dry. *  If you smoke, your wound can not heal properly -- please talk with us when you're ready to quit. F/U Appointment is with Dr. Zeinab Valentino in 2 weeks on                                                at                       .     Your nurse  is Veronica Maria RN     If we applied slip-resistant hospital socks today, be sure to remove them at least once a day to inspect your toes or feet, even if you're not changing the wraps or dressings underneath.  If you see anything concerning (redness, excess moisture, etc), please call and let us know right away.      Should you experience any significant changes in your wound(s) (including redness, increased warmth, increased pain, increased drainage, odor, or fever) or have questions about your wound care, please contact the [place] [address] at [phone] Monday-Thursday from 8:00 am - 4:30 pm, or Friday from 8:00 am - 2:30 pm.  If you need help with your wound outside these hours and cannot wait until we are again available, contact your home-care company (if applicable), your PCP, or go to the nearest emergency room

## 2022-09-18 NOTE — PROGRESS NOTES
88 [facility] Progress Note    Cinthya Baker     : 1950    DATE OF VISIT:  2022    Subjective:     Cinthya Baker is a 80 y.o. female who has a pressure ulcer located on the coccyx. Significant symptoms or pertinent wound history since last visit: feeling well overall, not much wound pain, no F/C/D, eating pretty well, offloading better now that she's less dyspneic. Additional ulcer(s) noted? no.      Her current medication list consists of Acapella, DULoxetine, Denosumab, Etanercept, Insulin Syringe-Needle U-100, Menthol (Topical Analgesic), Roflumilast, albuterol sulfate HFA, atorvastatin, azithromycin, blood glucose test strips, calcium carbonate, cetirizine, clopidogrel, cyclobenzaprine, docusate sodium, fluticasone, gabapentin, insulin glargine, insulin lispro, ipratropium, latanoprost, metoprolol succinate, morphine, naloxone, omeprazole, ondansetron, oxyCODONE-acetaminophen, predniSONE, sacubitril-valsartan, spironolactone, torsemide, and vitamin D. Allergies: Atenolol    Objective:     Vitals:    22 1031   BP: 104/62   Pulse: 89   Resp: 16   Temp: 98.6 °F (37 °C)   TempSrc: Oral   Weight: 166 lb 4.8 oz (75.4 kg)     AAOx3, fatigued, NAD  No cellulitis, angitis, fluctuance  No acute arthritis or bursitis  No contact dermatitis or cutaneous Candidiasis  Blanca-ulcer skin: indurated, pink, warm, mild maceration and hyperkeratosis  Ulcer(s):  a bit of improvement from last time, smaller, red, granular, fibrin, biofilm, still a bit of undermining to one side. Photos also saved in electronic chart.     Today's wound measurements, per RN documentation:  Wound 20 #2, Sacrum, Pressure Injury, Stage 4, Onset 2020-Wound Length (cm): 1 cm    Wound 20 #2, Sacrum, Pressure Injury, Stage 4, Onset 2020-Wound Width (cm): 0.3 cm    Wound 20 #2, Sacrum, Pressure Injury, Stage 4, Onset 2020-Wound Depth (cm): 0.2 cm    Assessment:     Patient Active Problem List   Diagnosis Code    Rheumatoid arthritis ([facility] M06.9    Psoriasis L40.9    GERD (gastroesophageal reflux disease) K21.9    Anemia D64.9    Cylindrical bronchiectasis ([facility]) J47.9    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    Immunocompromised state ([facility] D84.9    Coronary artery disease I25.10    Bilateral lower extremity edema R60.0    Essential hypertension I10    Lumbar spondylosis M47.816    Mitral valve insufficiency and aortic valve insufficiency I08.0    Mixed hyperlipidemia E78.2    Myopia of both eyes H52.13    Osteoporosis M81.0    Other chronic sinusitis J32.8    Primary open angle glaucoma (POAG) of both eyes, mild stage H40.1131    Primary osteoarthritis of right hip M16.11    Type 2 diabetes mellitus with unspecified diabetic retinopathy without macular edema ([facility]) E11.319    Type 2 diabetes mellitus with diabetic peripheral angiopathy without gangrene, with long-term current use of insulin ([facility]) E11.51, Z79.4    Pressure ulcer of coccygeal region, stage 4 ([facility]) L89.154    Mild malnutrition ([facility]) E44.1    Chronic diastolic congestive heart failure ([facility]) I50.32    Chronic obstructive pulmonary disease ([facility]) J44.9    Hypergranulation L92.9    Nonrheumatic mitral (valve) insufficiency I34.0    Chronic respiratory failure with hypoxia ([facility]) J96.11    Venous insufficiency of left lower extremity I87.2       Assessment of today's active condition(s): RA, decreased mobility, a number of hospitalizations, slowly healing stage 4 sacral pressure ulcer, no signs of infection, improved offloading this month since her recent respiratory illness improved. Factors contributing to occurrence and/or persistence of the chronic ulcer include diabetes, chronic pressure, decreased mobility, shear force, and immunosuppression. Medical necessity of today's visit is shown by the above documentation. Sharp debridement is indicated today, based upon the exam findings in the wound(s) above.      Procedure note:     Consent obtained. Time out performed per [facility] policy. Anesthetic  Anesthetic: 4% Lidocaine Cream     Using a curette, scissors, and forceps, I sharply debrided the sacrum ulcer(s) down through and including the removal of dermis. The type(s) of tissue debrided included fibrin, biofilm, and slough. Total Surface Area Debrided: 1 sq cm. The ulcers were then irrigated with normal saline solution. The procedure was completed with a small amount of bleeding, and hemostasis was with pressure. The patient tolerated the procedure well, with no significant complications. The patient's level of pain during and after the procedure was monitored. Post-debridement measurements, if different from pre-debridement, are in the flowsheet as well. Discharge plan:     Treatment in the wound care center today, per RN documentation: Wound 12/18/20 #2, Sacrum, Pressure Injury, Stage 4, Onset 5/2020-Dressing/Treatment: Other (comment) (Betadine-maegan wound,Triad maegan-wound,Multidex Powder,Collagen,Fluff gauze,sacral mepilex border). Keep focused on protein intake, glucose control, offloading. Home treatment: good handwashing before and after any dressing changes. Cleanse wound with saline or soap & water before dressing change. May use Vaseline (petrolatum), Aquaphor, Aveeno, CeraVe, Cetaphil, Eucerin, Lubriderm, etc for dry skin. Dressing type for home: as above, three times weekly. Written discharge instructions given to patient. Follow up in 2 weeks.     Electronically signed by Brendan Castillo MD on 9/18/2022 at 2:16 PM.

## 2022-09-19 NOTE — DISCHARGE INSTRUCTIONS
215 [facility] Physician Orders and Discharge [address]  [facility], [address]  [place], [facility]  Telephone: [phone]      Fax: [phone]        Your home care company:   [employer]ong Ponce [phone]      Your wound-care supplies will be provided by: [employer] orders supplies through [facility]. Please note, depending on your insurance coverage, you may have out-of-pocket expenses for these supplies. Someone from [place] may call you to confirm your order and discuss those potential costs before they ship your products -- please anticipate that call. If your out-of-pocket cost is going to be less than $200, and [facility] cannot reach you, they may ship your supplies as soon as the order is processed, and will send you a bill. If your out-of-pocket cost is going to be more than $200, [facility] should not ship your supplies until they speak with you. If you have any questions about your supplies or your potential out-of-pocket costs, or if you need to place an order for a refill of supplies (typically monthly), Adam Baugh is your local representative, and can be reached at [phone]. Information on [facility]'s return policy will also be enclosed with your supplies -- please read that carefully before opening your items. NAME:  Sammy Taveras   YOB: 1950  PRIMARY DIAGNOSIS FOR WOUND CARE CENTER:  Pressure ulcer     Wound cleansing:  Do not scrub or use excessive force. Wash hands with soap and water before and after dressing changes. Prior to applying a clean dressing, cleanse wound with normal saline, wound cleanser, or mild soap and water. Ask your physician or nurse before getting the wound(s) wet in the shower.                 Wound care for home:      Sacral Wound:  Betadine to maegan wound   Triad or Zinc Oxide to maegan-wound  Multidex Powder then Collagen with silver to wound bed & tuck into depth of wound and into undermining Fluff gauze over wound  Cover with small sacral mepilex border  C to change dressing on Monday, Wednesday, Friday next week        Please note, all wounds (unless stated otherwise here) were mechanically debrided at the time of cleansing here in the wound-care center today, so a small amount of pain, drainage or bleeding from that process might be expected, and is normal.     All products for home use, including multiple products for a single wound if applicable, are medically necessary in order to achieve the best chance at timely wound healing. See provider documentation for details if needed. Substituted dressings applied in the [address] [place] [address],[address] today, if applicable:           New orders for this week (labs, imaging, medications, etc.):           Additional instructions for specific diagnoses:     +ROHO cushion- use at [address] when sitting!!  +True Balance Air group II mattress      General comments for pressure ulcers: *  Make sure you stay well-hydrated, and maintain good protein intake. *  Reposition at least every two hours, to keep from having pressure in one spot for too long. *  If you are not sure whether you have the best offloading surfaces (mattress, mattress overlay, chair cushion, heel-protector boots, etc), please ask. *  Moisturize your skin regularly with Vaseline, Aquaphor, Aveeno, CeraVe, Cetaphil, Eucerin, Lubriderm, etc; but keep the skin between your toes dry. *  If you smoke, your wound can not heal properly -- please talk with us when you're ready to quit. F/U Appointment is with Dr. Nia Falcon in 2 weeks on                                                at                       .     Your nurse  is Huyen Mendez RN     If we applied slip-resistant hospital socks today, be sure to remove them at least once a day to inspect your toes or feet, even if you're not changing the wraps or dressings underneath.  If you see anything concerning (redness, excess moisture, etc), please call and let us know right away.      Should you experience any significant changes in your wound(s) (including redness, increased warmth, increased pain, increased drainage, odor, or fever) or have questions about your wound care, please contact the [address] [place] [address] at [phone] Monday-Thursday from 8:00 am - 4:30 pm, or Friday from 8:00 am - 2:30 pm.  If you need help with your wound outside these hours and cannot wait until we are again available, contact your home-care company (if applicable), your PCP, or go to the nearest emergency room

## 2022-09-23 ENCOUNTER — HOSPITAL ENCOUNTER (OUTPATIENT)
Dept: WOUND CARE | Age: 72
Discharge: HOME | End: 2022-09-23
Payer: MEDICARE

## 2022-09-23 VITALS
WEIGHT: 170 LBS | RESPIRATION RATE: 18 BRPM | TEMPERATURE: 98.4 F | HEIGHT: 68 IN | BODY MASS INDEX: 25.76 KG/M2 | DIASTOLIC BLOOD PRESSURE: 66 MMHG | HEART RATE: 98 BPM | SYSTOLIC BLOOD PRESSURE: 123 MMHG

## 2022-09-23 DIAGNOSIS — L89.154 PRESSURE ULCER OF COCCYGEAL REGION, STAGE 4 (HCC): ICD-10-CM

## 2022-09-23 DIAGNOSIS — L92.9 HYPERGRANULATION: ICD-10-CM

## 2022-09-23 DIAGNOSIS — I87.2 VENOUS INSUFFICIENCY OF LEFT LOWER EXTREMITY: Primary | ICD-10-CM

## 2022-09-23 PROCEDURE — 97597 DBRDMT OPN WND 1ST 20 CM/<: CPT

## 2022-09-23 PROCEDURE — 97597 DBRDMT OPN WND 1ST 20 CM/<: CPT | Performed by: INTERNAL MEDICINE

## 2022-09-23 RX ORDER — LIDOCAINE 50 MG/G
OINTMENT TOPICAL ONCE
Status: CANCELLED | OUTPATIENT
Start: 2022-09-23 | End: 2022-09-23

## 2022-09-23 RX ORDER — LIDOCAINE 40 MG/G
CREAM TOPICAL ONCE
Status: DISCONTINUED | OUTPATIENT
Start: 2022-09-23 | End: 2022-09-25 | Stop reason: HOSPADM

## 2022-09-23 RX ORDER — LIDOCAINE HYDROCHLORIDE 40 MG/ML
SOLUTION TOPICAL ONCE
Status: CANCELLED | OUTPATIENT
Start: 2022-09-23 | End: 2022-09-23

## 2022-09-23 RX ORDER — LIDOCAINE 40 MG/G
CREAM TOPICAL ONCE
Status: CANCELLED | OUTPATIENT
Start: 2022-09-23 | End: 2022-09-23

## 2022-09-23 RX ORDER — BACITRACIN ZINC AND POLYMYXIN B SULFATE 500; 1000 [USP'U]/G; [USP'U]/G
OINTMENT TOPICAL ONCE
Status: CANCELLED | OUTPATIENT
Start: 2022-09-23 | End: 2022-09-23

## 2022-09-23 NOTE — PROGRESS NOTES
215 [facility] Physician Orders and Discharge [address], [address]  [place], [facility]  Telephone: [phone]      Fax: [phone]        Your home care company:   Hong Ponce [phone]      Your wound-care supplies will be provided by: [facility] AT [facility]. Please note, depending on your insurance coverage, you may have out-of-pocket expenses for these supplies. Someone from [facility] may call you to confirm your order and discuss those potential costs before they ship your products -- please anticipate that call. If your out-of-pocket cost is going to be less than $200, and [facility] cannot reach you, they may ship your supplies as soon as the order is processed, and will send you a bill. If your out-of-pocket cost is going to be more than $200, [facility] should not ship your supplies until they speak with you. If you have any questions about your supplies or your potential out-of-pocket costs, or if you need to place an order for a refill of supplies (typically monthly), Adam Baugh is your local representative, and can be reached at [phone]. Information on [facility]'s return policy will also be enclosed with your supplies -- please read that carefully before opening your items. NAME:  Sammy Taveras   YOB: 1950  PRIMARY DIAGNOSIS FOR WOUND CARE CENTER:  Pressure ulcer     Wound cleansing:  Do not scrub or use excessive force. Wash hands with soap and water before and after dressing changes. Prior to applying a clean dressing, cleanse wound with normal saline, wound cleanser, or mild soap and water. Ask your physician or nurse before getting the wound(s) wet in the shower.                 Wound care for home:      Sacral Wound:  Betadine to maegan wound   Triad or Zinc Oxide to maegan-wound  Multidex Powder then Collagen with silver to wound bed & tuck into depth of wound and into undermining Fluff gauze over wound  Cover with small sacral mepilex border  C to change dressing on Monday, Wednesday, Friday next week        Please note, all wounds (unless stated otherwise here) were mechanically debrided at the time of cleansing here in the wound-care center today, so a small amount of pain, drainage or bleeding from that process might be expected, and is normal.     All products for home use, including multiple products for a single wound if applicable, are medically necessary in order to achieve the best chance at timely wound healing. See provider documentation for details if needed. Substituted dressings applied in the [address] [place] [address],[address] today, if applicable:           New orders for this week (labs, imaging, medications, etc.):           Additional instructions for specific diagnoses:     +ROHO cushion- use at [address] when sitting!!  +True Balance Air group II mattress      General comments for pressure ulcers: *  Make sure you stay well-hydrated, and maintain good protein intake. *  Reposition at least every two hours, to keep from having pressure in one spot for too long. *  If you are not sure whether you have the best offloading surfaces (mattress, mattress overlay, chair cushion, heel-protector boots, etc), please ask. *  Moisturize your skin regularly with Vaseline, Aquaphor, Aveeno, CeraVe, Cetaphil, Eucerin, Lubriderm, etc; but keep the skin between your toes dry. *  If you smoke, your wound can not heal properly -- please talk with us when you're ready to quit. F/U Appointment is with Dr. Sharda Lugo in 2 weeks on                                                at                       .     Your nurse  is Bridgette Pulido RN     If we applied slip-resistant hospital socks today, be sure to remove them at least once a day to inspect your toes or feet, even if you're not changing the wraps or dressings underneath.  If you see anything concerning (redness, excess moisture, etc), please call and let us know right away.      Should you experience any significant changes in your wound(s) (including redness, increased warmth, increased pain, increased drainage, odor, or fever) or have questions about your wound care, please contact the [place] [address] at [phone] Monday-Thursday from 8:00 am - 4:30 pm, or Friday from 8:00 am - 2:30 pm.  If you need help with your wound outside these hours and cannot wait until we are again available, contact your home-care company (if applicable), your PCP, or go to the nearest emergency room

## 2022-09-30 NOTE — PROGRESS NOTES
[facility] Progress Note    Elaine Leblanc     : 1950    DATE OF VISIT:  2022    Subjective:     Elaine Leblanc is a 80 y.o. female who has a pressure ulcer located on the coccyx. Significant symptoms or pertinent wound history since last visit: feeling well overall, little wound pain, offloading well, eating well, not completely adherent with compression for her legs, but no recurrent ulcers there. Breathing has been stable recently, so she's able to lie flat to sleep, so she's able to better offload her coccyx in that way. Additional ulcer(s) noted? no.      Her current medication list consists of Acapella, DULoxetine, Denosumab, Etanercept, Insulin Syringe-Needle U-100, Menthol (Topical Analgesic), Roflumilast, albuterol sulfate HFA, atorvastatin, azithromycin, blood glucose test strips, calcium carbonate, cetirizine, clopidogrel, cyclobenzaprine, docusate sodium, fluticasone, gabapentin, insulin glargine, insulin lispro, ipratropium, latanoprost, metoprolol succinate, morphine, naloxone, omeprazole, ondansetron, oxyCODONE-acetaminophen, predniSONE, sacubitril-valsartan, spironolactone, torsemide, and vitamin D. Allergies: Atenolol    Objective:     Vitals:    22 1026   BP: 123/66   Pulse: 98   Resp: 18   Temp: 98.4 °F (36.9 °C)   TempSrc: Oral   Weight: 170 lb (77.1 kg)   Height: 5' 8\" (1.727 m)     AAOx3, fatigued, NAD  No cellulitis, angitis, fluctuance  No acute arthritis or bursitis  No contact dermatitis or cutaneous Candidiasis  Blanca-ulcer skin: indurated, pink, warm, mild maceration and hyperkeratosis, which overhangs a couple of wound edges  Ulcer(s):  a bit more improvement from last time, smaller, minimal true depth, red, granular, fibrin, biofilm, less undermining to that one side. Photos also saved in electronic chart.     Today's wound measurements, per RN documentation:  Wound 20 #2, Sacrum, Pressure Injury, Stage 4, Onset 2020-Wound Length (cm): 1.2 cm    Wound 12/18/20 #2, Sacrum, Pressure Injury, Stage 4, Onset 5/2020-Wound Width (cm): 0.5 cm    Wound 12/18/20 #2, Sacrum, Pressure Injury, Stage 4, Onset 5/2020-Wound Depth (cm): 0.2 cm    Assessment:     Patient Active Problem List   Diagnosis Code    Rheumatoid arthritis ([facility] [address] M06.9    Psoriasis L40.9    GERD (gastroesophageal reflux disease) K21.9    Anemia D64.9    Cylindrical bronchiectasis (HCC) J47.9    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    Immunocompromised state ([facility] [address] D84.9    Coronary artery disease I25.10    Bilateral lower extremity edema R60.0    Essential hypertension I10    Lumbar spondylosis M47.816    Mitral valve insufficiency and aortic valve insufficiency I08.0    Mixed hyperlipidemia E78.2    Myopia of both eyes H52.13    Osteoporosis M81.0    Other chronic sinusitis J32.8    Primary open angle glaucoma (POAG) of both eyes, mild stage H40.1131    Primary osteoarthritis of right hip M16.11    Type 2 diabetes mellitus with unspecified diabetic retinopathy without macular edema ([facility]) E11.319    Type 2 diabetes mellitus with diabetic peripheral angiopathy without gangrene, with long-term current use of insulin ([facility]) E11.51, Z79.4    Pressure ulcer of coccygeal region, stage 4 ([facility]) L89.154    Mild malnutrition ([facility]) E44.1    Chronic diastolic congestive heart failure ([facility]) I50.32    Chronic obstructive pulmonary disease ([facility]) J44.9    Hypergranulation L92.9    Nonrheumatic mitral (valve) insufficiency I34.0    Chronic respiratory failure with hypoxia ([facility]) J96.11    Venous insufficiency of left lower extremity I87.2       Assessment of today's active condition(s): slowly healing stage 4 coccyx pressure ulcer, persistent for many months because of multiple intercurrent illnesses, hospitalizations, decreased mobility, courses of steroids, etc, but this is the best it's looked since we've been seeing her; no signs of infection or ongoing pressure, just still a bit of moisture and friction at times, I think. Factors contributing to occurrence and/or persistence of the chronic ulcer include diabetes, chronic pressure, decreased mobility, shear force, and immunosuppression. Medical necessity of today's visit is shown by the above documentation. Sharp debridement is indicated today, based upon the exam findings in the wound(s) above. Procedure note:     Consent obtained. Time out performed per [facility]. Anesthetic  Anesthetic: 4% Lidocaine Cream     Using a curette and #15 blade scalpel, I sharply debrided the coccyx ulcer(s) down through and including the removal of dermis. The type(s) of tissue debrided included fibrin, biofilm, and necrotic/eschar. Total Surface Area Debrided: 1 sq cm. The ulcers were then irrigated with normal saline solution. The procedure was completed with a small amount of bleeding, and hemostasis was with pressure. The patient tolerated the procedure well, with no significant complications. The patient's level of pain during and after the procedure was monitored. Post-debridement measurements, if different from pre-debridement, are in the flowsheet as well. Discharge plan:     Treatment in the wound care center today, per RN documentation: Wound 12/18/20 #2, Sacrum, Pressure Injury, Stage 4, Onset 5/2020-Dressing/Treatment: Other (comment) (Vashe, Betadine-maegan wound, ZnO maegan-wound, Collagen, gauze, sacral mepilex border). Keep up the great work with glucose control, protein intake, offloading. Home treatment: good handwashing before and after any dressing changes. Cleanse wound with saline or soap & water before dressing change. May use Vaseline (petrolatum), Aquaphor, Aveeno, CeraVe, Cetaphil, Eucerin, Lubriderm, etc for dry skin. Dressing type for home: as above, three times weekly. Written discharge instructions given to patient. Follow up in 2 weeks.     Electronically signed by Wayne Allen MD on 9/30/2022 at 8:58 AM.

## 2022-10-03 NOTE — DISCHARGE INSTRUCTIONS
215 [facility] Physician Orders and Discharge [address]  [address], [address]  [place], [facility]  Telephone: [phone]      Fax: [phone]        Your home care company:   [employer] [phone]      Your wound-care supplies will be provided by: [facility] AT [facility] orders supplies through [facility]. Please note, depending on your insurance coverage, you may have out-of-pocket expenses for these supplies. Someone from [facility] may call you to confirm your order and discuss those potential costs before they ship your products -- please anticipate that call. If your out-of-pocket cost is going to be less than $200, and [facility] cannot reach you, they may ship your supplies as soon as the order is processed, and will send you a bill. If your out-of-pocket cost is going to be more than $200, [facility] should not ship your supplies until they speak with you. If you have any questions about your supplies or your potential out-of-pocket costs, or if you need to place an order for a refill of supplies (typically monthly), Jessica Caraballo is your local representative, and can be reached at [phone]. Information on [facility]'s return policy will also be enclosed with your supplies -- please read that carefully before opening your items. NAME:  Jeffry Taveras   YOB: 1950  PRIMARY DIAGNOSIS FOR WOUND CARE CENTER:  Pressure ulcer     Wound cleansing:  Do not scrub or use excessive force. Wash hands with soap and water before and after dressing changes. Prior to applying a clean dressing, cleanse wound with normal saline, wound cleanser, or mild soap and water. Ask your physician or nurse before getting the wound(s) wet in the shower.                 Wound care for home:      Sacral Wound:  Betadine to maegan wound   Triad or Zinc Oxide to maegan-wound  Multidex Powder then Collagen with silver to wound bed & tuck into depth of wound and into undermining Fluff gauze over wound  Cover with small sacral mepilex border  C to change dressing on Monday, Wednesday, Friday next week        Please note, all wounds (unless stated otherwise here) were mechanically debrided at the time of cleansing here in the wound-care center today, so a small amount of pain, drainage or bleeding from that process might be expected, and is normal.     All products for home use, including multiple products for a single wound if applicable, are medically necessary in order to achieve the best chance at timely wound healing. See provider documentation for details if needed. Substituted dressings applied in the [facility] today, if applicable:           New orders for this week (labs, imaging, medications, etc.):           Additional instructions for specific diagnoses:     +ROHO cushion- use at [address] when sitting!!  +True Balance Air group II mattress      General comments for pressure ulcers: *  Make sure you stay well-hydrated, and maintain good protein intake. *  Reposition at least every two hours, to keep from having pressure in one spot for too long. *  If you are not sure whether you have the best offloading surfaces (mattress, mattress overlay, chair cushion, heel-protector boots, etc), please ask. *  Moisturize your skin regularly with Vaseline, Aquaphor, Aveeno, CeraVe, Cetaphil, Eucerin, Lubriderm, etc; but keep the skin between your toes dry. *  If you smoke, your wound can not heal properly -- please talk with us when you're ready to quit. F/U Appointment is with Dr. Wilma Moser in 2 weeks on                                                at                       .     Your nurse  is Lupe Hoover RN     If we applied slip-resistant hospital socks today, be sure to remove them at least once a day to inspect your toes or feet, even if you're not changing the wraps or dressings underneath.  If you see anything concerning (redness, excess moisture, etc), please call and let us know right away.      Should you experience any significant changes in your wound(s) (including redness, increased warmth, increased pain, increased drainage, odor, or fever) or have questions about your wound care, please contact the [place] [address] at [phone] Monday-Thursday from 8:00 am - 4:30 pm, or Friday from 8:00 am - 2:30 pm.  If you need help with your wound outside these hours and cannot wait until we are again available, contact your home-care company (if applicable), your PCP, or go to the nearest emergency room

## 2022-10-07 ENCOUNTER — HOSPITAL ENCOUNTER (OUTPATIENT)
Dept: WOUND CARE | Age: 72
Discharge: HOME | End: 2022-10-07
Payer: MEDICARE

## 2022-10-07 VITALS
RESPIRATION RATE: 18 BRPM | DIASTOLIC BLOOD PRESSURE: 68 MMHG | HEIGHT: 68 IN | TEMPERATURE: 98.2 F | WEIGHT: 172.2 LBS | HEART RATE: 91 BPM | BODY MASS INDEX: 26.1 KG/M2 | SYSTOLIC BLOOD PRESSURE: 128 MMHG

## 2022-10-07 DIAGNOSIS — L89.154 PRESSURE ULCER OF COCCYGEAL REGION, STAGE 4 (HCC): ICD-10-CM

## 2022-10-07 DIAGNOSIS — I87.2 VENOUS INSUFFICIENCY OF LEFT LOWER EXTREMITY: Primary | ICD-10-CM

## 2022-10-07 DIAGNOSIS — L92.9 HYPERGRANULATION: ICD-10-CM

## 2022-10-07 PROCEDURE — 97597 DBRDMT OPN WND 1ST 20 CM/<: CPT

## 2022-10-07 PROCEDURE — 97597 DBRDMT OPN WND 1ST 20 CM/<: CPT | Performed by: INTERNAL MEDICINE

## 2022-10-07 RX ORDER — LIDOCAINE 40 MG/G
CREAM TOPICAL ONCE
Status: DISCONTINUED | OUTPATIENT
Start: 2022-10-07 | End: 2022-10-08 | Stop reason: HOSPADM

## 2022-10-07 RX ORDER — LIDOCAINE HYDROCHLORIDE 40 MG/ML
SOLUTION TOPICAL ONCE
OUTPATIENT
Start: 2022-10-07 | End: 2022-10-07

## 2022-10-07 RX ORDER — BACITRACIN ZINC AND POLYMYXIN B SULFATE 500; 1000 [USP'U]/G; [USP'U]/G
OINTMENT TOPICAL ONCE
OUTPATIENT
Start: 2022-10-07 | End: 2022-10-07

## 2022-10-07 RX ORDER — LIDOCAINE 50 MG/G
OINTMENT TOPICAL ONCE
OUTPATIENT
Start: 2022-10-07 | End: 2022-10-07

## 2022-10-07 RX ORDER — LIDOCAINE 40 MG/G
CREAM TOPICAL ONCE
Status: CANCELLED | OUTPATIENT
Start: 2022-10-07 | End: 2022-10-07

## 2022-10-07 NOTE — PLAN OF CARE
No new orders for wound as it is slowly improving with current regime. Follow up in wound clinic in 2 weeks. Discharge instructions reviewed with patient, all questions answered, copy given to patient. Dressings were applied to all wounds per M.D. Instructions at this visit.

## 2022-10-07 NOTE — PROGRESS NOTES
215 [facility] [place] Physician Orders and Discharge [address] [facility]  [address], [address]  [place], [facility]  Telephone: [phone]      Fax: [phone]        Your home care company:   [employer] [phone]      Your wound-care supplies will be provided by: [employer] orders supplies through [facility]. Please note, depending on your insurance coverage, you may have out-of-pocket expenses for these supplies. Someone from [place] may call you to confirm your order and discuss those potential costs before they ship your products -- please anticipate that call. If your out-of-pocket cost is going to be less than $200, and [facility] cannot reach you, they may ship your supplies as soon as the order is processed, and will send you a bill. If your out-of-pocket cost is going to be more than $200, [facility] should not ship your supplies until they speak with you. If you have any questions about your supplies or your potential out-of-pocket costs, or if you need to place an order for a refill of supplies (typically monthly), Willi Cronin is your local representative, and can be reached at [phone]. Information on [facility]'s return policy will also be enclosed with your supplies -- please read that carefully before opening your items. NAME:  Ifeanyi Taveras   YOB: 1950  PRIMARY DIAGNOSIS FOR WOUND CARE CENTER:  Pressure ulcer     Wound cleansing:  Do not scrub or use excessive force. Wash hands with soap and water before and after dressing changes. Prior to applying a clean dressing, cleanse wound with normal saline, wound cleanser, or mild soap and water. Ask your physician or nurse before getting the wound(s) wet in the shower.                 Wound care for home:      Sacral Wound:  Betadine to maegan wound   Triad or Zinc Oxide to maegan-wound  Multidex Powder then Collagen with silver to wound bed & tuck into depth of wound and into undermining Fluff gauze over wound  Cover with small sacral mepilex border  C to change dressing on Monday, Wednesday, Friday next week        Please note, all wounds (unless stated otherwise here) were mechanically debrided at the time of cleansing here in the wound-care center today, so a small amount of pain, drainage or bleeding from that process might be expected, and is normal.     All products for home use, including multiple products for a single wound if applicable, are medically necessary in order to achieve the best chance at timely wound healing. See provider documentation for details if needed. Substituted dressings applied in the [address] [place] [address] today, if applicable:           New orders for this week (labs, imaging, medications, etc.):           Additional instructions for specific diagnoses:     +ROHO cushion- use at [address] when sitting!!  +True Balance Air group II mattress      General comments for pressure ulcers: *  Make sure you stay well-hydrated, and maintain good protein intake. *  Reposition at least every two hours, to keep from having pressure in one spot for too long. *  If you are not sure whether you have the best offloading surfaces (mattress, mattress overlay, chair cushion, heel-protector boots, etc), please ask. *  Moisturize your skin regularly with Vaseline, Aquaphor, Aveeno, CeraVe, Cetaphil, Eucerin, Lubriderm, etc; but keep the skin between your toes dry. *  If you smoke, your wound can not heal properly -- please talk with us when you're ready to quit. F/U Appointment is with Dr. Jose Saldana in 2 weeks on                                                at                       .     Your nurse  is Mnoae Chacon RN     If we applied slip-resistant hospital socks today, be sure to remove them at least once a day to inspect your toes or feet, even if you're not changing the wraps or dressings underneath.  If you see anything concerning (redness, excess moisture, etc), please call and let us know right away.      Should you experience any significant changes in your wound(s) (including redness, increased warmth, increased pain, increased drainage, odor, or fever) or have questions about your wound care, please contact the [address] [place] [address] at [phone] Monday-Thursday from 8:00 am - 4:30 pm, or Friday from 8:00 am - 2:30 pm.  If you need help with your wound outside these hours and cannot wait until we are again available, contact your home-care company (if applicable), your PCP, or go to the nearest emergency room

## 2022-10-10 ENCOUNTER — HOSPITAL ENCOUNTER (OUTPATIENT)
Age: 72
Setting detail: SPECIMEN
Discharge: HOME | End: 2022-10-10
Payer: MEDICARE

## 2022-10-10 LAB
A/G RATIO: 0.9 (ref 1.1–2.2)
ALBUMIN SERPL-MCNC: 3 G/DL (ref 3.4–5)
ALP BLD-CCNC: 85 U/L (ref 40–129)
ALT SERPL-CCNC: 8 U/L (ref 10–40)
ANION GAP SERPL CALCULATED.3IONS-SCNC: 15 MMOL/L (ref 3–16)
AST SERPL-CCNC: 11 U/L (ref 15–37)
BACTERIA: ABNORMAL /HPF
BASOPHILS ABSOLUTE: 0 K/UL (ref 0–0.2)
BASOPHILS RELATIVE PERCENT: 0.8 %
BILIRUB SERPL-MCNC: 0.9 MG/DL (ref 0–1)
BILIRUBIN URINE: NEGATIVE
BLOOD, URINE: ABNORMAL
BUN BLDV-MCNC: 22 MG/DL (ref 7–20)
CALCIUM SERPL-MCNC: 9 MG/DL (ref 8.3–10.6)
CHLORIDE BLD-SCNC: 94 MMOL/L (ref 99–110)
CLARITY: CLEAR
CO2: 25 MMOL/L (ref 21–32)
COLOR: YELLOW
CREAT SERPL-MCNC: 1.2 MG/DL (ref 0.6–1.2)
EOSINOPHILS ABSOLUTE: 0.2 K/UL (ref 0–0.6)
EOSINOPHILS RELATIVE PERCENT: 2.9 %
EPITHELIAL CELLS, UA: ABNORMAL /HPF (ref 0–5)
GFR AFRICAN AMERICAN: 54
GFR NON-AFRICAN AMERICAN: 44
GLUCOSE BLD-MCNC: 250 MG/DL (ref 70–99)
GLUCOSE URINE: NEGATIVE MG/DL
HCT VFR BLD CALC: 27.5 % (ref 36–48)
HEMOGLOBIN: 9.5 G/DL (ref 12–16)
KETONES, URINE: NEGATIVE MG/DL
LEUKOCYTE ESTERASE, URINE: NEGATIVE
LYMPHOCYTES ABSOLUTE: 1 K/UL (ref 1–5.1)
LYMPHOCYTES RELATIVE PERCENT: 16.6 %
MCH RBC QN AUTO: 31 PG (ref 26–34)
MCHC RBC AUTO-ENTMCNC: 34.7 G/DL (ref 31–36)
MCV RBC AUTO: 89.3 FL (ref 80–100)
MICROSCOPIC EXAMINATION: YES
MONOCYTES ABSOLUTE: 0.6 K/UL (ref 0–1.3)
MONOCYTES RELATIVE PERCENT: 10.3 %
NEUTROPHILS ABSOLUTE: 4 K/UL (ref 1.7–7.7)
NEUTROPHILS RELATIVE PERCENT: 69.4 %
NITRITE, URINE: NEGATIVE
PDW BLD-RTO: 13.7 % (ref 12.4–15.4)
PH UA: 5.5 (ref 5–8)
PLATELET # BLD: 253 K/UL (ref 135–450)
PMV BLD AUTO: 8.1 FL (ref 5–10.5)
POTASSIUM SERPL-SCNC: 4.1 MMOL/L (ref 3.5–5.1)
PROTEIN UA: NEGATIVE MG/DL
RBC # BLD: 3.08 M/UL (ref 4–5.2)
RBC UA: ABNORMAL /HPF (ref 0–4)
SODIUM BLD-SCNC: 134 MMOL/L (ref 136–145)
SPECIFIC GRAVITY UA: 1.01 (ref 1–1.03)
TOTAL PROTEIN: 6.5 G/DL (ref 6.4–8.2)
URINE TYPE: ABNORMAL
UROBILINOGEN, URINE: 0.2 E.U./DL
WBC # BLD: 5.8 K/UL (ref 4–11)
WBC UA: ABNORMAL /HPF (ref 0–5)

## 2022-10-10 PROCEDURE — 36415 COLL VENOUS BLD VENIPUNCTURE: CPT

## 2022-10-10 PROCEDURE — 81001 URINALYSIS AUTO W/SCOPE: CPT

## 2022-10-10 PROCEDURE — 85025 COMPLETE CBC W/AUTO DIFF WBC: CPT

## 2022-10-10 PROCEDURE — 87086 URINE CULTURE/COLONY COUNT: CPT

## 2022-10-10 PROCEDURE — 80053 COMPREHEN METABOLIC PANEL: CPT

## 2022-10-11 LAB — URINE CULTURE, ROUTINE: NORMAL

## 2022-10-19 NOTE — DISCHARGE INSTRUCTIONS
215 [facility] Physician Orders and Discharge [address] [facility]  [address], [address]  [place], [facility]  Telephone: [phone]      Fax: [phone]        Your home care company:   [employer] [phone]      Your wound-care supplies will be provided by: [facility] AT [facility] orders supplies through [facility]. Please note, depending on your insurance coverage, you may have out-of-pocket expenses for these supplies. Someone from [place] may call you to confirm your order and discuss those potential costs before they ship your products -- please anticipate that call. If your out-of-pocket cost is going to be less than $200, and [facility] cannot reach you, they may ship your supplies as soon as the order is processed, and will send you a bill. If your out-of-pocket cost is going to be more than $200, [facility] should not ship your supplies until they speak with you. If you have any questions about your supplies or your potential out-of-pocket costs, or if you need to place an order for a refill of supplies (typically monthly), Cierra Stanton is your local representative, and can be reached at [phone]. Information on [facility]'s return policy will also be enclosed with your supplies -- please read that carefully before opening your items. NAME:  Jay Taveras   YOB: 1950  PRIMARY DIAGNOSIS FOR WOUND CARE CENTER:  Pressure ulcer     Wound cleansing:  Do not scrub or use excessive force. Wash hands with soap and water before and after dressing changes. Prior to applying a clean dressing, cleanse wound with normal saline, wound cleanser, or mild soap and water. Ask your physician or nurse before getting the wound(s) wet in the shower.                 Wound care for home:      Sacral Wound:  Betadine to maegan wound   Triad or Zinc Oxide to maegan-wound  Multidex Powder then Collagen with silver to wound bed & tuck into depth of wound and into undermining Fluff gauze over wound  Cover with small sacral mepilex border  [facility]HC to change dressing on Monday, Wednesday, Friday next week        Please note, all wounds (unless stated otherwise here) were mechanically debrided at the time of cleansing here in the wound-care center today, so a small amount of pain, drainage or bleeding from that process might be expected, and is normal.     All products for home use, including multiple products for a single wound if applicable, are medically necessary in order to achieve the best chance at timely wound healing. See provider documentation for details if needed. Substituted dressings applied in the [facility] today, if applicable:           New orders for this week (labs, imaging, medications, etc.):           Additional instructions for specific diagnoses:     +ROHO cushion- use at [address] when sitting!!  +True Balance Air group II mattress      General comments for pressure ulcers: *  Make sure you stay well-hydrated, and maintain good protein intake. *  Reposition at least every two hours, to keep from having pressure in one spot for too long. *  If you are not sure whether you have the best offloading surfaces (mattress, mattress overlay, chair cushion, heel-protector boots, etc), please ask. *  Moisturize your skin regularly with Vaseline, Aquaphor, Aveeno, CeraVe, Cetaphil, Eucerin, Lubriderm, etc; but keep the skin between your toes dry. *  If you smoke, your wound can not heal properly -- please talk with us when you're ready to quit. F/U Appointment is with Dr. Kadi Moreira in 2 weeks on                                                at                       .     Your nurse  is Wilmer De Leon RN     If we applied slip-resistant hospital socks today, be sure to remove them at least once a day to inspect your toes or feet, even if you're not changing the wraps or dressings underneath.  If you see anything concerning (redness, excess moisture, etc), please call and let us know right away.      Should you experience any significant changes in your wound(s) (including redness, increased warmth, increased pain, increased drainage, odor, or fever) or have questions about your wound care, please contact the [place] [address] at [phone] Monday-Thursday from 8:00 am - 4:30 pm, or Friday from 8:00 am - 2:30 pm.  If you need help with your wound outside these hours and cannot wait until we are again available, contact your home-care company (if applicable), your PCP, or go to the nearest emergency room

## 2022-10-20 NOTE — PROGRESS NOTES
[facility] Progress Note    Quoc Rg     : 1950    DATE OF VISIT:  10/7/2022    Subjective:     Quoc Rg is a 71 y.o. female who has a pressure ulcer located on the coccyx. Significant symptoms or pertinent wound history since last visit: feeling well overall, no fever, not much pain, stable drainage, doing well with offloading and protein intake. Additional ulcer(s) noted? no.      Her current medication list consists of Acapella, DULoxetine, Denosumab, Etanercept, Insulin Syringe-Needle U-100, Menthol (Topical Analgesic), Roflumilast, albuterol sulfate HFA, atorvastatin, azithromycin, blood glucose test strips, calcium carbonate, cetirizine, clopidogrel, cyclobenzaprine, docusate sodium, fluticasone, gabapentin, insulin glargine, insulin lispro, ipratropium, latanoprost, metoprolol succinate, morphine, naloxone, omeprazole, ondansetron, oxyCODONE-acetaminophen, predniSONE, sacubitril-valsartan, spironolactone, torsemide, and vitamin D. Allergies: Atenolol    Objective:     Vitals:    10/07/22 1006   BP: 128/68   Pulse: 91   Resp: 18   Temp: 98.2 °F (36.8 °C)   TempSrc: Oral   Weight: 172 lb 3.2 oz (78.1 kg)   Height: 5' 8\" (1.727 m)     AAOx3, fatigued, NAD  No cellulitis, angitis, fluctuance  No acute arthritis or bursitis  No contact dermatitis or cutaneous Candidiasis  Blanca-ulcer skin: indurated, pink, warm, milder maceration and hyperkeratosis, which overhangs a couple of wound edges  Ulcer(s):  maybe unchanged from last time, smaller, minimal true depth, red, granular, fibrin, biofilm, less undermining to one side, a small focus of moist epidermal necrosis. Photos also saved in electronic chart.     Today's wound measurements, per RN documentation:  Wound 20 #2, Sacrum, Pressure Injury, Stage 4, Onset 2020-Wound Length (cm): 1 cm    Wound 20 #2, Sacrum, Pressure Injury, Stage 4, Onset 2020-Wound Width (cm): 0.3 cm    Wound 20 #2, Sacrum, Pressure Injury, Stage 4, Onset 5/2020-Wound Depth (cm): 0.4 cm    Assessment:     Patient Active Problem List   Diagnosis Code    Rheumatoid arthritis ([facility] M06.9    Psoriasis L40.9    GERD (gastroesophageal reflux disease) K21.9    Anemia D64.9    Cylindrical bronchiectasis ([facility]) J47.9    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    Immunocompromised state ([facility] [address] D84.9    Coronary artery disease I25.10    Bilateral lower extremity edema R60.0    Essential hypertension I10    Lumbar spondylosis M47.816    Mitral valve insufficiency and aortic valve insufficiency I08.0    Mixed hyperlipidemia E78.2    Myopia of both eyes H52.13    Osteoporosis M81.0    Other chronic sinusitis J32.8    Primary open angle glaucoma (POAG) of both eyes, mild stage H40.1131    Primary osteoarthritis of right hip M16.11    Type 2 diabetes mellitus with unspecified diabetic retinopathy without macular edema ([facility]) E11.319    Type 2 diabetes mellitus with diabetic peripheral angiopathy without gangrene, with long-term current use of insulin ([facility]) E11.51, Z79.4    Pressure ulcer of coccygeal region, stage 4 ([facility]) L89.154    Mild malnutrition ([facility]) E44.1    Chronic diastolic congestive heart failure ([facility]) I50.32    Chronic obstructive pulmonary disease ([facility]) J44.9    Hypergranulation L92.9    Nonrheumatic mitral (valve) insufficiency I34.0    Chronic respiratory failure with hypoxia ([facility]) J96.11    Venous insufficiency of left lower extremity I87.2       Assessment of today's active condition(s): slowly healing stage 4 coccyx pressure ulcer, no signs of infection, generally well offloaded, still a bit moist. Factors contributing to occurrence and/or persistence of the chronic ulcer include diabetes, chronic pressure, decreased mobility, shear force, and immunosuppression. Medical necessity of today's visit is shown by the above documentation. Sharp debridement is indicated today, based upon the exam findings in the wound(s) above.      Procedure note: Consent obtained. Time out performed per [facility]. Anesthetic  Anesthetic: 4% Lidocaine Cream     Using a curette and #15 blade scalpel, I sharply debrided the coccyx ulcer(s) down through and including the removal of dermis. The type(s) of tissue debrided included fibrin, biofilm, and necrotic/eschar. Total Surface Area Debrided: 1 sq cm. The ulcers were then irrigated with normal saline solution. The procedure was completed with a small amount of bleeding, and hemostasis was with pressure. The patient tolerated the procedure well, with no significant complications. The patient's level of pain during and after the procedure was monitored. Post-debridement measurements, if different from pre-debridement, are in the flowsheet as well. Discharge plan:     Treatment in the wound care center today, per RN documentation: Wound 12/18/20 #2, Sacrum, Pressure Injury, Stage 4, Onset 5/2020-Dressing/Treatment: Other (comment) (Vashe, Betadine and Zinc Oxide to maegan-wound, Multidex Powder, Collagen, Fluff gauze, mepilex border). Continue consistent efforts with offloading (group 2 mattress, ROHO, frequent position changes, careful transfers without sliding), protein intake, glucose control. Home treatment: good handwashing before and after any dressing changes. Cleanse wound with saline or soap & water before dressing change. May use Vaseline (petrolatum), Aquaphor, Aveeno, CeraVe, Cetaphil, Eucerin, Lubriderm, etc for dry skin. Dressing type for home: as above, every other day. Written discharge instructions given to patient. Follow up in 2 weeks.     Electronically signed by Sarabjit Rivas MD on 10/20/2022 at 8:37 AM.

## 2022-10-21 ENCOUNTER — HOSPITAL ENCOUNTER (OUTPATIENT)
Dept: WOUND CARE | Age: 72
Discharge: HOME | End: 2022-10-21
Payer: MEDICARE

## 2022-10-21 VITALS
HEART RATE: 85 BPM | DIASTOLIC BLOOD PRESSURE: 71 MMHG | RESPIRATION RATE: 18 BRPM | BODY MASS INDEX: 25.54 KG/M2 | TEMPERATURE: 98.9 F | WEIGHT: 168 LBS | SYSTOLIC BLOOD PRESSURE: 138 MMHG

## 2022-10-21 DIAGNOSIS — L92.9 HYPERGRANULATION: ICD-10-CM

## 2022-10-21 DIAGNOSIS — L89.154 PRESSURE ULCER OF COCCYGEAL REGION, STAGE 4 (HCC): ICD-10-CM

## 2022-10-21 DIAGNOSIS — I87.2 VENOUS INSUFFICIENCY OF LEFT LOWER EXTREMITY: Primary | ICD-10-CM

## 2022-10-21 PROCEDURE — 97597 DBRDMT OPN WND 1ST 20 CM/<: CPT

## 2022-10-21 ASSESSMENT — PAIN SCALES - GENERAL: PAINLEVEL_OUTOF10: 0

## 2022-10-21 NOTE — PLAN OF CARE
Patient seen in [address] [place] [address],[address] today. States she has been feeling well. Wounds show signs of improvement. Patient educated on need for off loading and good nutrition to aid in wound healing. Debridement complete per Dr. Franko Marcum. F/u in [address] [place] [address],[address] in 1 week as ordered, pt. Aware to call sooner with any changes or questions/concerns. Discharge instructions reviewed with patient, all questions answered, copy given to patient. Dressings were applied to all wounds per M.D. Instructions at this visit.

## 2022-10-31 NOTE — DISCHARGE INSTRUCTIONS
[facility] Physician Orders and Discharge [address]  [address], [address]  [place], [facility]  Telephone: [phone]      Fax: [phone]        Your home care company:   [facility] [phone]      Your wound-care supplies will be provided by: [facility] orders supplies through [facility]. Please note, depending on your insurance coverage, you may have out-of-pocket expenses for these supplies. Someone from [facility] may call you to confirm your order and discuss those potential costs before they ship your products -- please anticipate that call. If your out-of-pocket cost is going to be less than $200, and [facility] cannot reach you, they may ship your supplies as soon as the order is processed, and will send you a bill. If your out-of-pocket cost is going to be more than $200, [facility] should not ship your supplies until they speak with you. If you have any questions about your supplies or your potential out-of-pocket costs, or if you need to place an order for a refill of supplies (typically monthly), Ron Moreno is your local representative, and can be reached at [phone]. Information on [facility]'s return policy will also be enclosed with your supplies -- please read that carefully before opening your items. NAME:  Ashley Taveras   YOB: 1950  PRIMARY DIAGNOSIS FOR WOUND CARE CENTER:  Pressure ulcer     Wound cleansing:  Do not scrub or use excessive force. Wash hands with soap and water before and after dressing changes. Prior to applying a clean dressing, cleanse wound with normal saline, wound cleanser, or mild soap and water. Ask your physician or nurse before getting the wound(s) wet in the shower.                 Wound care for home:      Sacral Wound:  Betadine to maegan wound   Triad or Zinc Oxide to maegan-wound  Multidex Powder then Collagen with silver to wound bed & tuck into depth of wound and into undermining Fluff gauze over wound  Cover with small sacral mepilex border  C to change dressing on Monday, Wednesday, Friday next week        Please note, all wounds (unless stated otherwise here) were mechanically debrided at the time of cleansing here in the wound-care center today, so a small amount of pain, drainage or bleeding from that process might be expected, and is normal.     All products for home use, including multiple products for a single wound if applicable, are medically necessary in order to achieve the best chance at timely wound healing. See provider documentation for details if needed. Substituted dressings applied in the [address] [place] [address],[address] today, if applicable:           New orders for this week (labs, imaging, medications, etc.):           Additional instructions for specific diagnoses:     +ROHO cushion- use at [address] when sitting!!  +True Balance Air group II mattress      General comments for pressure ulcers: *  Make sure you stay well-hydrated, and maintain good protein intake. *  Reposition at least every two hours, to keep from having pressure in one spot for too long. *  If you are not sure whether you have the best offloading surfaces (mattress, mattress overlay, chair cushion, heel-protector boots, etc), please ask. *  Moisturize your skin regularly with Vaseline, Aquaphor, Aveeno, CeraVe, Cetaphil, Eucerin, Lubriderm, etc; but keep the skin between your toes dry. *  If you smoke, your wound can not heal properly -- please talk with us when you're ready to quit. F/U Appointment is with Dr. Radha Bailey in 2.5 weeks on  Monday 11/21/22                                              at                       .     Your nurse  is Abdulkadir Warren,     If we applied slip-resistant hospital socks today, be sure to remove them at least once a day to inspect your toes or feet, even if you're not changing the wraps or dressings underneath.  If you see anything concerning (redness, excess moisture, etc), please call and let us know right away.      Should you experience any significant changes in your wound(s) (including redness, increased warmth, increased pain, increased drainage, odor, or fever) or have questions about your wound care, please contact the [facility] at [phone] Monday-Thursday from 8:00 am - 4:30 pm, or Friday from 8:00 am - 2:30 pm.  If you need help with your wound outside these hours and cannot wait until we are again available, contact your home-care company (if applicable), your PCP, or go to the nearest emergency room

## 2022-11-01 ENCOUNTER — OFFICE VISIT (OUTPATIENT)
Dept: PULMONOLOGY | Age: 72
End: 2022-11-01
Payer: MEDICARE

## 2022-11-01 VITALS
OXYGEN SATURATION: 97 % | BODY MASS INDEX: 25.91 KG/M2 | HEIGHT: 68 IN | WEIGHT: 171 LBS | SYSTOLIC BLOOD PRESSURE: 130 MMHG | HEART RATE: 58 BPM | DIASTOLIC BLOOD PRESSURE: 65 MMHG | RESPIRATION RATE: 18 BRPM

## 2022-11-01 DIAGNOSIS — Z23 NEED FOR INFLUENZA VACCINATION: ICD-10-CM

## 2022-11-01 DIAGNOSIS — R93.89 ABNORMAL CT OF THE CHEST: ICD-10-CM

## 2022-11-01 DIAGNOSIS — J44.9 CHRONIC OBSTRUCTIVE PULMONARY DISEASE, UNSPECIFIED COPD TYPE (HCC): Primary | ICD-10-CM

## 2022-11-01 PROCEDURE — 1123F ACP DISCUSS/DSCN MKR DOCD: CPT | Performed by: INTERNAL MEDICINE

## 2022-11-01 PROCEDURE — G0008 ADMIN INFLUENZA VIRUS VAC: HCPCS | Performed by: INTERNAL MEDICINE

## 2022-11-01 PROCEDURE — 3078F DIAST BP <80 MM HG: CPT | Performed by: INTERNAL MEDICINE

## 2022-11-01 PROCEDURE — 99214 OFFICE O/P EST MOD 30 MIN: CPT | Performed by: INTERNAL MEDICINE

## 2022-11-01 PROCEDURE — 90694 VACC AIIV4 NO PRSRV 0.5ML IM: CPT | Performed by: INTERNAL MEDICINE

## 2022-11-01 PROCEDURE — 3074F SYST BP LT 130 MM HG: CPT | Performed by: INTERNAL MEDICINE

## 2022-11-01 NOTE — PATIENT INSTRUCTIONS
Vaccine Information Statement    Influenza (Flu) Vaccine (Inactivated or Recombinant): What You Need to Know    Many vaccine information statements are available in Malay and other languages. See www.immunize.org/vis. Hojas de información sobre vacunas están disponibles en español y en muchos otros idiomas. Visite www.immunize.org/vis. 1. Why get vaccinated? Influenza vaccine can prevent influenza (flu). Flu is a contagious disease that spreads around the [facility] every year, usually between October and May. Anyone can get the flu, but it is more dangerous for some people. Infants and young children, people 72 years and older, pregnant people, and people with certain health conditions or a weakened immune system are at greatest risk of flu complications. Pneumonia, bronchitis, sinus infections, and ear infections are examples of flu-related complications. If you have a medical condition, such as heart disease, cancer, or diabetes, flu can make it worse. Flu can cause fever and chills, sore throat, muscle aches, fatigue, cough, headache, and runny or stuffy nose. Some people may have vomiting and diarrhea, though this is more common in children than adults. In an average year, thousands of people in the [facility] die from flu, and many more are hospitalized. Flu vaccine prevents millions of illnesses and flu-related visits to the doctor each year. 2. Influenza vaccines     CDC recommends everyone 6 months and older get vaccinated every flu season. Children 6 months through 6years of age may need 2 doses during a single flu season. Everyone else needs only 1 dose each flu season. It takes about 2 weeks for protection to develop after vaccination. There are many flu viruses, and they are always changing. Each year a new flu vaccine is made to protect against the influenza viruses believed to be likely to cause disease in the upcoming flu season.  Even when the vaccine doesn't exactly match these viruses, it may still provide some protection. Influenza vaccine does not cause flu. Influenza vaccine may be given at the same time as other vaccines. 3. Talk with your health care provider    Tell your vaccination provider if the person getting the vaccine:  Has had an allergic reaction after a previous dose of influenza vaccine, or has any severe, life-threatening allergies   Has ever had Guillain-Barré Syndrome (also called GBS)    In some cases, your health care provider may decide to postpone influenza vaccination until a future visit. Influenza vaccine can be administered at any time during pregnancy. People who are or will be pregnant during influenza season should receive inactivated influenza vaccine. People with minor illnesses, such as a cold, may be vaccinated. People who are moderately or severely ill should usually wait until they recover before getting influenza vaccine. Your health care provider can give you more information. 4. Risks of a vaccine reaction    Soreness, redness, and swelling where the shot is given, fever, muscle aches, and headache can happen after influenza vaccination. There may be a very small increased risk of Guillain-Barré Syndrome (GBS) after inactivated influenza vaccine (the flu shot). Erikaizaiah Rodrigues children who get the flu shot along with pneumococcal vaccine (PCV13) and/or DTaP vaccine at the same time might be slightly more likely to have a seizure caused by fever. Tell your health care provider if a child who is getting flu vaccine has ever had a seizure. People sometimes faint after medical procedures, including vaccination. Tell your provider if you feel dizzy or have vision changes or ringing in the ears. As with any medicine, there is a very remote chance of a vaccine causing a severe allergic reaction, other serious injury, or death. 5. What if there is a serious problem?     An allergic reaction could occur after the vaccinated person leaves the clinic. If you see signs of a severe allergic reaction (hives, swelling of the face and throat, difficulty breathing, a fast heartbeat, dizziness, or weakness), call 9-1-1 and get the person to the nearest hospital.    For other signs that concern you, call your health care provider. Adverse reactions should be reported to the Vaccine Adverse Event Reporting System (VAERS). Your health care provider will usually file this report, or you can do it yourself. Visit the VAERS website at www.vaers. [facility].gov or call 5-436.536.6818. VAERS is only for reporting reactions, and VAERS staff members do not give medical advice. 6. The National Vaccine Injury Compensation Program    The [facility] Vaccine Injury Compensation Program (VICP) is a federal program that was created to compensate people who may have been injured by certain vaccines. Claims regarding alleged injury or death due to vaccination have a time limit for filing, which may be as short as two years. Visit the VICP website at www.[facility].gov/vaccinecompensation or call 2-190.812.6359 to learn about the program and about filing a claim. 7. How can I learn more? Ask your health care provider. Call your local or state health department. Visit the website of the Food and Drug Administration (FDA) for vaccine package inserts and additional information at www.fda.gov/vaccines-blood-biologics/vaccines. Contact the Centers for Disease Control and Prevention (CDC): Call 9-636.813.5229 (4-245-PEP-INFO) or  Visit CDC's influenza website at www.cdc.gov/flu. Vaccine Information Statement   Inactivated Influenza Vaccine   8/6/2021  42 . [facility] Fall [id number]     Department of Health and Human Services  Centers for Disease Control and Prevention    Office Use Only

## 2022-11-01 NOTE — PROGRESS NOTES
PULMONARY, CRITICAL CARE AND SLEEP MEDICINE   CC: Cough    Interval History 11/2/22 - recently diagnosed with pneumonia on CT chest, feeling better     Previous history: 61 yo with 2 month h/o severe waxing/waning cough, tx'd with avelox w/o improvement & then changed to Zpac, some improvement; then treated with prednisone & Zpac & then clearly better, then returned & retreated with cough medicine & prednisone. No cough prior to 3 months ago except with sinus drainage. Cough has slowly been improving since last visit here. DATA:   Blood pressure 130/65, pulse 58, resp. rate 18, height 5' 8\" (1.727 m), weight 171 lb (77.6 kg), SpO2 97 %. 'on RA  Constitutional:  No acute distress. HENT:  Oropharynx is clear and moist.   Neck: No tracheal deviation present. Cardiovascular: Normal heart sounds. No lower extremity edema. Pulmonary/Chest: few wheezes. No rhonchi. No rales. No decreased breath sounds. No accessory muscle usage or stridor. Musculoskeletal: No cyanosis. No clubbing. Skin: Skin is warm and dry. Psychiatric: Normal mood and affect. Neurologic: speech fluent, alert and oriented, strength symmetric        DATA:   PFT 1/2012   FEV1 2.2 L 77% nl ratio TLC 84% DLCO 18.29 88%  PFT 4/22/2013 FEV1 2.26 L 70%  TLC 92% DLCO 19.08 69%  PFT Mar 2014  FEV1 2.08 L 65%  TLC 5.63 88% DLCO 20.05 73%  PFT June 2014 FEV1 2.12 L 69%  TLC 6.14 99% DLCO 18.31 68%  PFT April 2016 FEV1 2.24 L FVC 3.2 L TLC 6.36 l DLCO 19.16 72%    Pertussis antibodies are positive    CPAP titration 8/20/19 CPAP 7  PSG 7/17/19 AHI 8, but AHI 46 with REM    CABG 5/3/2021   Urgent coronary bypass grafting surgery x3 with single greater saphenous vein graft to the posterior ventricular branch of the right coronary artery, separate single greater saphenous vein graft to the second obtuse marginal branch of circumflex, pedicled left internal artery to the LAD.  Left atrial appendage obliteration    ABHISHEK 10/29/2021  LVEF 50-55%  Mild mitral regurgitation  Left atrium mildly dilated  Bubble study showed grade 1 pulmonary atrial venous malformation  Moderate layered plaque is noted in the ascending aorta & arch    CT CHEST 8/8/22  Impression   Lobar atelectasis of the left lower lobe with intermixed components of fluid   density and lucencies which could represent varicoid bronchiectasis   intervening or necrotic components of airspace disease such as   bronchopneumonia. Small left hydropneumothorax not excluded. This is   developed in the interim from comparison 12/13/2021 CT. Findings are   superimposed upon moderate bronchiectatic and emphysematous changes. ASSESSMENT:   COPD/Chronic bronchitis/cough with cylindrical bronchiectasis with acute exacerbation   Centrilobular pulmonary emphysema  H/O COVID 09/64/40 complicated by MRSA pneumonia   Mild DANIELLE/REM related sleep disordered breathing - recently restarted CPAP with benefit   CAD s/p CABG 5/3/21  Tracheomalacia    Lower extremity edema  Pulmonary Nodules have resolved  Rheumatoid arthritis on Embrel and MTX and 4 mg prednisone  Positive tobacco history, 20 pack year quit in [address] [place] [address], [address]   CC: Cough    Interval History: was doing well, now with several day h/o worsening cough with shortness of breath     Previous history: 61 yo with 2 month h/o severe waxing/waning cough, tx'd with avelox w/o improvement & then changed to Zpac, some improvement; then treated with prednisone & Zpac & then clearly better, then returned & retreated with cough medicine & prednisone. No cough prior to 3 months ago except with sinus drainage. Cough has slowly been improving since last visit here. DATA:   Blood pressure 130/65, pulse 58, resp. rate 18, height 5' 8\" (1.727 m), weight 171 lb (77.6 kg), SpO2 97 %. 'on RA  Constitutional:  No acute distress. HENT:  Oropharynx is clear and moist.   Neck: No tracheal deviation present.    Cardiovascular: Normal heart sounds. No lower extremity edema. Pulmonary/Chest: few wheezes. No rhonchi. No rales. No decreased breath sounds. No accessory muscle usage or stridor. Musculoskeletal: No cyanosis. No clubbing. Skin: Skin is warm and dry. Psychiatric: Normal mood and affect. Neurologic: speech fluent, alert and oriented, strength symmetric        DATA:   PFT 1/2012   FEV1 2.2 L 77% nl ratio TLC 84% DLCO 18.29 88%  PFT 4/22/2013 FEV1 2.26 L 70%  TLC 92% DLCO 19.08 69%  PFT Mar 2014  FEV1 2.08 L 65%  TLC 5.63 88% DLCO 20.05 73%  PFT June 2014 FEV1 2.12 L 69%  TLC 6.14 99% DLCO 18.31 68%  PFT April 2016 FEV1 2.24 L FVC 3.2 L TLC 6.36 l DLCO 19.16 72%    Pertussis antibodies are positive    CPAP titration 8/20/19 CPAP 7  PSG 7/17/19 AHI 8, but AHI 46 with REM    CABG 5/3/2021   Urgent coronary bypass grafting surgery x3 with single greater saphenous vein graft to the posterior ventricular branch of the right coronary artery, separate single greater saphenous vein graft to the second obtuse marginal branch of circumflex, pedicled left internal artery to the LAD. Left atrial appendage obliteration    ABHISHEK 10/29/2021  LVEF 50-55%  Mild mitral regurgitation  Left atrium mildly dilated  Bubble study showed grade 1 pulmonary atrial venous malformation  Moderate layered plaque is noted in the ascending aorta & arch    CTPA 12/13/2021  Mediastinum: No evidence of mediastinal lymphadenopathy. The heart and   pericardium demonstrate no acute abnormality. There is no acute abnormality   of the thoracic aorta. Lungs/pleura: The lungs are without acute process. Linear parenchymal   scarring in pleural thickening seen at the right lung base. No focal   consolidation or pulmonary edema. No evidence of pleural effusion or   pneumothorax. Impression   No evidence of pulmonary embolism or acute pulmonary abnormality.    Scarring at the right lung base     CXR 3/17/22 chronic R base changes    ASSESSMENT:   Abnormal CT Chest  COPD/Chronic bronchitis/cough with cylindrical bronchiectasis    Centrilobular pulmonary emphysema  H/O COVID 14/83/09 complicated by MRSA pneumonia   Mild DANIELLE/REM related sleep disordered breathing - recently restarted CPAP with benefit   CAD s/p CABG 5/3/21  Tracheomalacia    Lower extremity edema  Pulmonary Nodules have resolved  Rheumatoid arthritis on Embrel and MTX and 4 mg prednisone  Positive tobacco history, 20 pack year quit in 1991  Diabetes    PLAN:   Prednisone taper and Ceftin   Continue azithromycin daily  Continue Daliresp; off Singulair  -- Failed Singulair, Failed Dulera, Failed Spiriva, Failed Advair. I recommended regular use of CPAP. F/U will be with me in 4-6 months         PLAN:   Continue azithromycin daily  Continue Daliresp; off Singulair  -- Failed Singulair, Failed Dulera, Failed Spiriva, Failed Advair. I recommended regular use of CPAP.      CT CHEST no IV dye in 4-8 weeks, see me after, dx abnormal CT CHEST   Flu shot today (to get COVID vaccine at pharmacy)

## 2022-11-04 ENCOUNTER — HOSPITAL ENCOUNTER (OUTPATIENT)
Dept: WOUND CARE | Age: 72
Discharge: HOME | End: 2022-11-04
Payer: MEDICARE

## 2022-11-04 VITALS
HEART RATE: 63 BPM | SYSTOLIC BLOOD PRESSURE: 111 MMHG | BODY MASS INDEX: 26.19 KG/M2 | WEIGHT: 172.8 LBS | TEMPERATURE: 98.2 F | RESPIRATION RATE: 18 BRPM | HEIGHT: 68 IN | DIASTOLIC BLOOD PRESSURE: 53 MMHG

## 2022-11-04 DIAGNOSIS — L92.9 HYPERGRANULATION: ICD-10-CM

## 2022-11-04 DIAGNOSIS — L89.154 PRESSURE ULCER OF COCCYGEAL REGION, STAGE 4 (HCC): ICD-10-CM

## 2022-11-04 DIAGNOSIS — I87.2 VENOUS INSUFFICIENCY OF LEFT LOWER EXTREMITY: Primary | ICD-10-CM

## 2022-11-04 PROCEDURE — 97597 DBRDMT OPN WND 1ST 20 CM/<: CPT

## 2022-11-04 RX ORDER — LIDOCAINE 40 MG/G
CREAM TOPICAL ONCE
Status: CANCELLED | OUTPATIENT
Start: 2022-11-04 | End: 2022-11-04

## 2022-11-04 RX ORDER — BACITRACIN ZINC AND POLYMYXIN B SULFATE 500; 1000 [USP'U]/G; [USP'U]/G
OINTMENT TOPICAL ONCE
OUTPATIENT
Start: 2022-11-04 | End: 2022-11-04

## 2022-11-04 RX ORDER — LIDOCAINE HYDROCHLORIDE 40 MG/ML
SOLUTION TOPICAL ONCE
OUTPATIENT
Start: 2022-11-04 | End: 2022-11-04

## 2022-11-04 RX ORDER — LIDOCAINE 50 MG/G
OINTMENT TOPICAL ONCE
OUTPATIENT
Start: 2022-11-04 | End: 2022-11-04

## 2022-11-04 RX ORDER — LIDOCAINE 40 MG/G
CREAM TOPICAL ONCE
Status: DISCONTINUED | OUTPATIENT
Start: 2022-11-04 | End: 2022-11-05 | Stop reason: HOSPADM

## 2022-11-04 NOTE — PLAN OF CARE
Patient seen in [address] [place] [address],[address] today. Sister at bedside. Patient is currently suffering from rhuematoid arthritis flare. Her wrists are swollen and painful. She has no complaints regarding wound. Wound showing signs of improvement. Debridement per Dr. Arturo Solis. Patient tolerated well. F/u in [address] [place] [address],[address] on 11/21/22. Albert Haines Aware to call sooner with any changes or questions/concerns. Discharge instructions reviewed with patient, all questions answered, copy given to patient. Dressings were applied to all wounds per M.D. Instructions at this visit.

## 2022-11-15 NOTE — DISCHARGE INSTRUCTIONS
215 [facility] Physician Orders and Discharge 800 [facility]  [address], [address]  [place], [facility]  Telephone: [phone]      Fax: [phone]        Your home care company:   Hong Ponce [phone]      Your wound-care supplies will be provided by: [employer] orders supplies through [facility]. Please note, depending on your insurance coverage, you may have out-of-pocket expenses for these supplies. Someone from [facility] may call you to confirm your order and discuss those potential costs before they ship your products -- please anticipate that call. If your out-of-pocket cost is going to be less than $200, and [facility] cannot reach you, they may ship your supplies as soon as the order is processed, and will send you a bill. If your out-of-pocket cost is going to be more than $200, [facility] should not ship your supplies until they speak with you. If you have any questions about your supplies or your potential out-of-pocket costs, or if you need to place an order for a refill of supplies (typically monthly), Analilia Parsons is your local representative, and can be reached at [phone]. Information on [facility]'s return policy will also be enclosed with your supplies -- please read that carefully before opening your items. NAME:  Lise Taveras   YOB: 1950  PRIMARY DIAGNOSIS FOR WOUND CARE CENTER:  Pressure ulcer     Wound cleansing:  Do not scrub or use excessive force. Wash hands with soap and water before and after dressing changes. Prior to applying a clean dressing, cleanse wound with normal saline, wound cleanser, or mild soap and water. Ask your physician or nurse before getting the wound(s) wet in the shower.                 Wound care for home:      Sacral Wound:  Betadine to maegan wound   Triad or Zinc Oxide to maegan-wound  Multidex Powder then Collagen with silver to wound bed & tuck into depth of wound and into undermining towards patient's left (10 o'clock)  Fluff gauze over wound  Cover with small sacral mepilex border  C to change dressing on Monday, Wednesday, Friday next week        Please note, all wounds (unless stated otherwise here) were mechanically debrided at the time of cleansing here in the wound-care center today, so a small amount of pain, drainage or bleeding from that process might be expected, and is normal.     All products for home use, including multiple products for a single wound if applicable, are medically necessary in order to achieve the best chance at timely wound healing. See provider documentation for details if needed. Substituted dressings applied in the [address] [place] [address],[address] today, if applicable:           New orders for this week (labs, imaging, medications, etc.):           Additional instructions for specific diagnoses:     +ROHO cushion- use at [address] when sitting!!  +True Balance Air group II mattress      General comments for pressure ulcers: *  Make sure you stay well-hydrated, and maintain good protein intake. *  Reposition at least every two hours, to keep from having pressure in one spot for too long. *  If you are not sure whether you have the best offloading surfaces (mattress, mattress overlay, chair cushion, heel-protector boots, etc), please ask. *  Moisturize your skin regularly with Vaseline, Aquaphor, Aveeno, CeraVe, Cetaphil, Eucerin, Lubriderm, etc; but keep the skin between your toes dry. *  If you smoke, your wound can not heal properly -- please talk with us when you're ready to quit. F/U Appointment is with Dr. Lexi Salcido in 2 weeks on                                              at                       .     Your nurse  is Vishnu Ratliff,     If we applied slip-resistant hospital socks today, be sure to remove them at least once a day to inspect your toes or feet, even if you're not changing the wraps or dressings underneath.  If you see anything concerning (redness, excess moisture, etc), please call and let us know right away.      Should you experience any significant changes in your wound(s) (including redness, increased warmth, increased pain, increased drainage, odor, or fever) or have questions about your wound care, please contact the [facility] at [phone] Monday-Thursday from 8:00 am - 4:30 pm, or Friday from 8:00 am - 2:30 pm.  If you need help with your wound outside these hours and cannot wait until we are again available, contact your home-care company (if applicable), your PCP, or go to the nearest emergency room

## 2022-11-21 ENCOUNTER — HOSPITAL ENCOUNTER (OUTPATIENT)
Dept: WOUND CARE | Age: 72
Discharge: HOME | End: 2022-11-21
Payer: MEDICARE

## 2022-11-21 VITALS
SYSTOLIC BLOOD PRESSURE: 99 MMHG | TEMPERATURE: 97.8 F | HEIGHT: 68 IN | RESPIRATION RATE: 18 BRPM | DIASTOLIC BLOOD PRESSURE: 58 MMHG | BODY MASS INDEX: 25.67 KG/M2 | WEIGHT: 169.4 LBS | HEART RATE: 85 BPM

## 2022-11-21 DIAGNOSIS — L92.9 HYPERGRANULATION: ICD-10-CM

## 2022-11-21 DIAGNOSIS — L89.154 PRESSURE ULCER OF COCCYGEAL REGION, STAGE 4 (HCC): ICD-10-CM

## 2022-11-21 DIAGNOSIS — I87.2 VENOUS INSUFFICIENCY OF LEFT LOWER EXTREMITY: Primary | ICD-10-CM

## 2022-11-21 PROCEDURE — 97597 DBRDMT OPN WND 1ST 20 CM/<: CPT

## 2022-11-21 RX ORDER — BACITRACIN ZINC AND POLYMYXIN B SULFATE 500; 1000 [USP'U]/G; [USP'U]/G
OINTMENT TOPICAL ONCE
OUTPATIENT
Start: 2022-11-21 | End: 2022-11-21

## 2022-11-21 RX ORDER — LIDOCAINE 40 MG/G
CREAM TOPICAL ONCE
Status: DISCONTINUED | OUTPATIENT
Start: 2022-11-21 | End: 2022-11-22 | Stop reason: HOSPADM

## 2022-11-21 RX ORDER — LIDOCAINE HYDROCHLORIDE 40 MG/ML
SOLUTION TOPICAL ONCE
OUTPATIENT
Start: 2022-11-21 | End: 2022-11-21

## 2022-11-21 RX ORDER — LIDOCAINE 40 MG/G
CREAM TOPICAL ONCE
OUTPATIENT
Start: 2022-11-21 | End: 2022-11-21

## 2022-11-21 RX ORDER — LIDOCAINE 50 MG/G
OINTMENT TOPICAL ONCE
OUTPATIENT
Start: 2022-11-21 | End: 2022-11-21

## 2022-11-21 NOTE — PLAN OF CARE
Pt to the [facility] for follow up appointment. Wound debrided today per Dr Mady Simmons. Pt to continue with collagen to wound. Pt to continue to offload wound. Pt to follow up in the [facility] in 2 weeks. JOSE Garcia to follow up with patient. Discharge instructions reviewed with patient, all questions answered, copy given to patient. Dressings were applied to all wounds per M.D. Instructions at this visit.

## 2022-11-21 NOTE — PROGRESS NOTES
215 [facility] Physician Orders and Discharge 800 [facility]  [address], [address]  [place], [facility]  Telephone: [phone]      Fax: [phone]        Your home care company:   Hong Ponce [phone]      Your wound-care supplies will be provided by: [facility] AT [facility] orders supplies [facility]. Please note, depending on your insurance coverage, you may have out-of-pocket expenses for these supplies. Someone from [facility] may call you to confirm your order and discuss those potential costs before they ship your products -- please anticipate that call. If your out-of-pocket cost is going to be less than $200, and [facility] cannot reach you, they may ship your supplies as soon as the order is processed, and will send you a bill. If your out-of-pocket cost is going to be more than $200, [facility] should not ship your supplies until they speak with you. If you have any questions about your supplies or your potential out-of-pocket costs, or if you need to place an order for a refill of supplies (typically monthly), Analilia Parsons is your local representative, and can be reached at [phone]. Information on [facility]'s return policy will also be enclosed with your supplies -- please read that carefully before opening your items. NAME:  Lise Taveras   YOB: 1950  PRIMARY DIAGNOSIS FOR WOUND CARE CENTER:  Pressure ulcer     Wound cleansing:  Do not scrub or use excessive force. Wash hands with soap and water before and after dressing changes. Prior to applying a clean dressing, cleanse wound with normal saline, wound cleanser, or mild soap and water. Ask your physician or nurse before getting the wound(s) wet in the shower.                 Wound care for home:      Sacral Wound:  Betadine to maegan wound   Triad or Zinc Oxide to maegan-wound  Multidex Powder then Collagen with silver to wound bed & tuck into depth of wound and into undermining towards patient's left (10 o'clock)  Fluff gauze over wound  Cover with small sacral mepilex border  C to change dressing on Monday, Wednesday, Friday next week        Please note, all wounds (unless stated otherwise here) were mechanically debrided at the time of cleansing here in the wound-care center today, so a small amount of pain, drainage or bleeding from that process might be expected, and is normal.     All products for home use, including multiple products for a single wound if applicable, are medically necessary in order to achieve the best chance at timely wound healing. See provider documentation for details if needed. Substituted dressings applied in the [facility] today, if applicable:           New orders for this week (labs, imaging, medications, etc.):           Additional instructions for specific diagnoses:     +ROHO cushion- use at [address] when sitting!!  +True Balance Air group II mattress      General comments for pressure ulcers: *  Make sure you stay well-hydrated, and maintain good protein intake. *  Reposition at least every two hours, to keep from having pressure in one spot for too long. *  If you are not sure whether you have the best offloading surfaces (mattress, mattress overlay, chair cushion, heel-protector boots, etc), please ask. *  Moisturize your skin regularly with Vaseline, Aquaphor, Aveeno, CeraVe, Cetaphil, Eucerin, Lubriderm, etc; but keep the skin between your toes dry. *  If you smoke, your wound can not heal properly -- please talk with us when you're ready to quit. F/U Appointment is with Dr. Amber Barajas in 2 weeks on                                              at                       .     Your nurse  is Nanette Romero,     If we applied slip-resistant hospital socks today, be sure to remove them at least once a day to inspect your toes or feet, even if you're not changing the wraps or dressings underneath.  If you see anything concerning (redness, excess moisture, etc), please call and let us know right away.      Should you experience any significant changes in your wound(s) (including redness, increased warmth, increased pain, increased drainage, odor, or fever) or have questions about your wound care, please contact the [place] [address] at [phone] Monday-Thursday from 8:00 am - 4:30 pm, or Friday from 8:00 am - 2:30 pm.  If you need help with your wound outside these hours and cannot wait until we are again available, contact your home-care company (if applicable), your PCP, or go to the nearest emergency room

## 2022-11-28 NOTE — DISCHARGE INSTRUCTIONS
215 [facility] Physician Orders and Discharge [address], [address]  [place], [facility]  Telephone: [phone]      Fax: [phone]        Your home care company:   Hong Ponec [phone]      Your wound-care supplies will be provided by: [employer] orders supplies through [facility]. Please note, depending on your insurance coverage, you may have out-of-pocket expenses for these supplies. Someone from [facility] may call you to confirm your order and discuss those potential costs before they ship your products -- please anticipate that call. If your out-of-pocket cost is going to be less than $200, and [facility] cannot reach you, they may ship your supplies as soon as the order is processed, and will send you a bill. If your out-of-pocket cost is going to be more than $200, [facility] should not ship your supplies until they speak with you. If you have any questions about your supplies or your potential out-of-pocket costs, or if you need to place an order for a refill of supplies (typically monthly), Dorothy Goldmann is your local representative, and can be reached at [phone]. Information on [facility]'s return policy will also be enclosed with your supplies -- please read that carefully before opening your items. NAME:  Caryn Taveras   YOB: 1950  PRIMARY DIAGNOSIS FOR WOUND CARE CENTER:  Pressure ulcer     Wound cleansing:  Do not scrub or use excessive force. Wash hands with soap and water before and after dressing changes. Prior to applying a clean dressing, cleanse wound with normal saline, wound cleanser, or mild soap and water. Ask your physician or nurse before getting the wound(s) wet in the shower.                 Wound care for home:      Sacral Wound:  Betadine to maegan wound   Triad or Zinc Oxide to maegan-wound  Multidex Powder then Collagen with silver to wound bed & tuck into depth of wound and into undermining towards patient's left (10 o'clock)  Fluff gauze over wound  Cover with small sacral mepilex border  C to change dressing on Monday, Wednesday, Friday next week        Please note, all wounds (unless stated otherwise here) were mechanically debrided at the time of cleansing here in the wound-care center today, so a small amount of pain, drainage or bleeding from that process might be expected, and is normal.     All products for home use, including multiple products for a single wound if applicable, are medically necessary in order to achieve the best chance at timely wound healing. See provider documentation for details if needed. Substituted dressings applied in the [facility] today, if applicable:           New orders for this week (labs, imaging, medications, etc.):           Additional instructions for specific diagnoses:     +ROHO cushion- use at [address] when sitting!!  +True Balance Air group II mattress      General comments for pressure ulcers: *  Make sure you stay well-hydrated, and maintain good protein intake. *  Reposition at least every two hours, to keep from having pressure in one spot for too long. *  If you are not sure whether you have the best offloading surfaces (mattress, mattress overlay, chair cushion, heel-protector boots, etc), please ask. *  Moisturize your skin regularly with Vaseline, Aquaphor, Aveeno, CeraVe, Cetaphil, Eucerin, Lubriderm, etc; but keep the skin between your toes dry. *  If you smoke, your wound can not heal properly -- please talk with us when you're ready to quit. F/U Appointment is with Dr. Kirstie Badillo in 2 weeks on                                              at                       .     Your nurse  is Rachael Valdez,     If we applied slip-resistant hospital socks today, be sure to remove them at least once a day to inspect your toes or feet, even if you're not changing the wraps or dressings underneath.  If you see anything concerning (redness, excess moisture, etc), please call and let us know right away.      Should you experience any significant changes in your wound(s) (including redness, increased warmth, increased pain, increased drainage, odor, or fever) or have questions about your wound care, please contact the [place] [address] at [phone] Monday-Thursday from 8:00 am - 4:30 pm, or Friday from 8:00 am - 2:30 pm.  If you need help with your wound outside these hours and cannot wait until we are again available, contact your home-care company (if applicable), your PCP, or go to the nearest emergency room

## 2022-12-02 ENCOUNTER — HOSPITAL ENCOUNTER (OUTPATIENT)
Dept: WOUND CARE | Age: 72
Discharge: HOME | End: 2022-12-02
Payer: MEDICARE

## 2022-12-02 VITALS
BODY MASS INDEX: 25.31 KG/M2 | RESPIRATION RATE: 18 BRPM | HEIGHT: 68 IN | SYSTOLIC BLOOD PRESSURE: 114 MMHG | TEMPERATURE: 98 F | DIASTOLIC BLOOD PRESSURE: 65 MMHG | WEIGHT: 167 LBS | HEART RATE: 93 BPM

## 2022-12-02 DIAGNOSIS — I87.2 VENOUS INSUFFICIENCY OF LEFT LOWER EXTREMITY: Primary | ICD-10-CM

## 2022-12-02 DIAGNOSIS — L89.154 PRESSURE ULCER OF COCCYGEAL REGION, STAGE 4 (HCC): ICD-10-CM

## 2022-12-02 DIAGNOSIS — L92.9 HYPERGRANULATION: ICD-10-CM

## 2022-12-02 PROCEDURE — 97597 DBRDMT OPN WND 1ST 20 CM/<: CPT

## 2022-12-02 RX ORDER — LIDOCAINE HYDROCHLORIDE 40 MG/ML
SOLUTION TOPICAL ONCE
OUTPATIENT
Start: 2022-12-02 | End: 2022-12-02

## 2022-12-02 RX ORDER — LIDOCAINE 40 MG/G
CREAM TOPICAL ONCE
OUTPATIENT
Start: 2022-12-02 | End: 2022-12-02

## 2022-12-02 RX ORDER — LIDOCAINE 50 MG/G
OINTMENT TOPICAL ONCE
OUTPATIENT
Start: 2022-12-02 | End: 2022-12-02

## 2022-12-02 RX ORDER — LIDOCAINE 40 MG/G
CREAM TOPICAL ONCE
Status: DISCONTINUED | OUTPATIENT
Start: 2022-12-02 | End: 2022-12-03 | Stop reason: HOSPADM

## 2022-12-02 RX ORDER — BACITRACIN ZINC AND POLYMYXIN B SULFATE 500; 1000 [USP'U]/G; [USP'U]/G
OINTMENT TOPICAL ONCE
OUTPATIENT
Start: 2022-12-02 | End: 2022-12-02

## 2022-12-02 NOTE — PLAN OF CARE
Patient seen in [address] [place] [address],[address] today for follow up. Reports feeling very well overall. Wound showing significant signs of healing. Wound debridement per Dr. Adrian Coleman. Patient tolerated well. Plan of care will continue this week. Patient states she is using her Roho cushion whenever she is sitting. F/u in [address] [place] [address],[address] in 2 weeks as ordered, pt. Aware to call sooner with any changes or questions/concerns.

## 2022-12-02 NOTE — PLAN OF CARE
215 [facility] Physician Orders and Discharge 800 [facility]  [address], [address]  [place], [facility]  Telephone: [phone]      Fax: [phone]        Your home care company:   Hong Ponce [phone]      Your wound-care supplies will be provided by: [facility] AT [facility] orders supplies through [facility]. Please note, depending on your insurance coverage, you may have out-of-pocket expenses for these supplies. Someone from [facility] may call you to confirm your order and discuss those potential costs before they ship your products -- please anticipate that call. If your out-of-pocket cost is going to be less than $200, and [facility] cannot reach you, they may ship your supplies as soon as the order is processed, and will send you a bill. If your out-of-pocket cost is going to be more than $200, [facility] should not ship your supplies until they speak with you. If you have any questions about your supplies or your potential out-of-pocket costs, or if you need to place an order for a refill of supplies (typically monthly), John Beltrán is your local representative, and can be reached at [phone]. Information on [facility]'s return policy will also be enclosed with your supplies -- please read that carefully before opening your items. NAME:  Florencia Taveras   YOB: 1950  PRIMARY DIAGNOSIS FOR WOUND CARE CENTER:  Pressure ulcer     Wound cleansing:  Do not scrub or use excessive force. Wash hands with soap and water before and after dressing changes. Prior to applying a clean dressing, cleanse wound with normal saline, wound cleanser, or mild soap and water. Ask your physician or nurse before getting the wound(s) wet in the shower.                 Wound care for home:      Sacral Wound:  Betadine to maegan wound   Triad or Zinc Oxide to maegan-wound  Multidex Powder then Collagen with silver to wound bed & tuck into depth of wound and into undermining towards patient's left (10 o'clock)  Fluff gauze over wound  Cover with small sacral mepilex border  C to change dressing on Monday, Wednesday, Friday next week        Please note, all wounds (unless stated otherwise here) were mechanically debrided at the time of cleansing here in the wound-care center today, so a small amount of pain, drainage or bleeding from that process might be expected, and is normal.     All products for home use, including multiple products for a single wound if applicable, are medically necessary in order to achieve the best chance at timely wound healing. See provider documentation for details if needed. Substituted dressings applied in the [facility] today, if applicable:           New orders for this week (labs, imaging, medications, etc.):           Additional instructions for specific diagnoses:     +ROHO cushion- use at [address] when sitting!!  +True Balance Air group II mattress      General comments for pressure ulcers: *  Make sure you stay well-hydrated, and maintain good protein intake. *  Reposition at least every two hours, to keep from having pressure in one spot for too long. *  If you are not sure whether you have the best offloading surfaces (mattress, mattress overlay, chair cushion, heel-protector boots, etc), please ask. *  Moisturize your skin regularly with Vaseline, Aquaphor, Aveeno, CeraVe, Cetaphil, Eucerin, Lubriderm, etc; but keep the skin between your toes dry. *  If you smoke, your wound can not heal properly -- please talk with us when you're ready to quit. F/U Appointment is with Dr. Papa Sun in 2 weeks on                                              at                       .     Your nurse  is Mauricio Rock,     If we applied slip-resistant hospital socks today, be sure to remove them at least once a day to inspect your toes or feet, even if you're not changing the wraps or dressings underneath.  If you see anything concerning (redness, excess moisture, etc), please call and let us know right away.      Should you experience any significant changes in your wound(s) (including redness, increased warmth, increased pain, increased drainage, odor, or fever) or have questions about your wound care, please contact the [place] [address] at [phone] Monday-Thursday from 8:00 am - 4:30 pm, or Friday from 8:00 am - 2:30 pm.  If you need help with your wound outside these hours and cannot wait until we are again available, contact your home-care company (if applicable), your PCP, or go to the nearest emergency room Finasteride Pregnancy And Lactation Text: This medication is absolutely contraindicated during pregnancy. It is unknown if it is excreted in breast milk.

## 2022-12-04 NOTE — PROGRESS NOTES
[facility] Progress Note    Simone Grant     : 1950    DATE OF VISIT:  2022    Subjective:     Simone Grant is a 71 y.o. female who has a pressure ulcer located on the coccyx. Significant symptoms or pertinent wound history since last visit: feeling ok, no fever, little pain, doing well with eating, offloading, dressings. Additional ulcer(s) noted? no.      Her current medication list consists of Acapella, DULoxetine, Denosumab, Etanercept, Insulin Syringe-Needle U-100, Menthol (Topical Analgesic), Roflumilast, albuterol sulfate HFA, atorvastatin, azithromycin, blood glucose test strips, calcium carbonate, cetirizine, clopidogrel, cyclobenzaprine, docusate sodium, fluticasone, gabapentin, insulin glargine, insulin lispro, ipratropium, latanoprost, metoprolol succinate, morphine, naloxone, omeprazole, ondansetron, oxyCODONE-acetaminophen, predniSONE, sacubitril-valsartan, spironolactone, torsemide, and vitamin D. Allergies: Atenolol    Objective:     Vitals:    22 1023   BP: (!) 99/58   Pulse: 85   Resp: 18   Temp: 97.8 °F (36.6 °C)   TempSrc: Oral   Weight: 169 lb 6.4 oz (76.8 kg)   Height: 5' 8\" (1.727 m)        AAOx3, fatigued, NAD   No cellulitis, angitis, fluctuance  No acute arthritis or bursitis  No contact dermatitis or cutaneous Candidiasis  Blanca-ulcer skin: indurated, pink, warm, still a bit of maceration and hyperkeratosis, which overhangs a couple of wound edges  Ulcer(s):  same size as last time, little true depth, red, granular, fibrin, biofilm, a bit of persistent tunneling or undermining to one side again (10:00), a small focus of moist epidermal necrosis and epibole. Photos also saved in electronic chart.     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    Immunocompromised state ([facility] [address] D84.9    Coronary artery disease I25.10    Bilateral lower extremity edema R60.0    Essential hypertension I10    Lumbar spondylosis M47.816    Mitral valve insufficiency and aortic valve insufficiency I08.0    Mixed hyperlipidemia E78.2    Myopia of both eyes H52.13    Osteoporosis M81.0    Other chronic sinusitis J32.8    Primary open angle glaucoma (POAG) of both eyes, mild stage H40.1131    Primary osteoarthritis of right hip M16.11    Type 2 diabetes mellitus with unspecified diabetic retinopathy without macular edema ([facility]) E11.319    Type 2 diabetes mellitus with diabetic peripheral angiopathy without gangrene, with long-term current use of insulin ([facility]) E11.51, Z79.4    Pressure ulcer of coccygeal region, stage 4 ([facility]) L89.154    Mild malnutrition ([facility]) E44.1    Chronic diastolic congestive heart failure ([facility]) I50.32    Chronic obstructive pulmonary disease ([facility]) J44.9    Hypergranulation L92.9    Nonrheumatic mitral (valve) insufficiency I34.0    Chronic respiratory failure with hypoxia ([facility]) J96.11    Venous insufficiency of left lower extremity I87.2       Assessment of today's active condition(s): RA, multiple other medical problems, multiple hospitalizations the last year or two, decreased mobility, longstanding stage 4 coccyx pressure ulcer, but making slow progress toward healing, no signs of infection. Factors contributing to occurrence and/or persistence of the chronic ulcer include diabetes, chronic pressure, decreased mobility, shear force, and immunosuppression.  Medical necessity of today's visit is shown by the above documentation. Sharp debridement is indicated today, based upon the exam findings in the wound(s) above. Procedure note:     Consent obtained. Time out performed per [facility] policy. Anesthetic  Anesthetic: 4% Lidocaine Cream     Using a curette and forceps, I sharply debrided the coccyx ulcer(s) down through and including the removal of dermis. The type(s) of tissue debrided included fibrin, biofilm, and necrotic/eschar. Total Surface Area Debrided: 1 sq cm. The ulcers were then irrigated with normal saline solution. The procedure was completed with a small amount of bleeding, and hemostasis was with pressure. The patient tolerated the procedure well, with no significant complications. The patient's level of pain during and after the procedure was monitored. Post-debridement measurements, if different from pre-debridement, are in the flowsheet as well. Discharge plan:     Treatment in the wound care center today, per RN documentation: Wound 12/18/20 #2, Sacrum, Pressure Injury, Stage 4, Onset 5/2020-Dressing/Treatment: Other (comment) (betadine to maegan wound, zinc oxide to periwound, multidex, collagen with silver, 2x2 fluff, sacral mepilex border). Keep up with protein intake, glucose control, offloading, careful transfers with a minimum of sliding. At home, need to be sure to tuck the collagen into that small 10:00 pocket; I hesitate to excise that skin there, as slow as she's rebuilt tissue thus far. Home treatment: good handwashing before and after any dressing changes. Cleanse wound with saline or soap & water before dressing change. May use Vaseline (petrolatum), Aquaphor, Aveeno, CeraVe, Cetaphil, Eucerin, Lubriderm, etc for dry skin. Dressing type for home:  Vashe, then as above , three times weekly. Written discharge instructions given to patient. Follow up in 2 weeks.     Electronically signed by Hayden Davila MD on 12/4/2022 at 2:33 PM.

## 2022-12-04 NOTE — PROGRESS NOTES
[facility] Progress Note    Otilio Dinero     : 1950    DATE OF VISIT:  2022    Subjective:     Otilio Dinero is a 71 y.o. female who has a pressure ulcer located on the coccyx. Significant symptoms or pertinent wound history since last visit: feeling well, a bit more joint pain with the change in weather, but offloading well, no fever, no acute resp issues. Additional ulcer(s) noted? no.      Her current medication list consists of Acapella, DULoxetine, Denosumab, Etanercept, Insulin Syringe-Needle U-100, Menthol (Topical Analgesic), Roflumilast, albuterol sulfate HFA, atorvastatin, azithromycin, blood glucose test strips, calcium carbonate, cetirizine, clopidogrel, cyclobenzaprine, docusate sodium, fluticasone, gabapentin, insulin glargine, insulin lispro, ipratropium, latanoprost, metoprolol succinate, morphine, naloxone, omeprazole, ondansetron, oxyCODONE-acetaminophen, predniSONE, sacubitril-valsartan, spironolactone, torsemide, and vitamin D. Allergies: Atenolol    Objective:     Vitals:    22 1043   BP: 114/65   Pulse: 93   Resp: 18   Temp: 98 °F (36.7 °C)   TempSrc: Oral   Weight: 167 lb (75.8 kg)   Height: 5' 8\" (1.727 m)     AAOx3, fatigued, NAD   No cellulitis, angitis, fluctuance  No acute arthritis or bursitis  No contact dermatitis or cutaneous Candidiasis  Blanca-ulcer skin: indurated, pink, warm, still a bit of maceration and hyperkeratosis, which overhangs a couple of wound edges  Ulcer(s):  this week definitely smaller than last time, a bit of true depth, red, granular, fibrin, biofilm, a bit less tunneling or undermining to one side again (10:00), a small focus of moist epidermal necrosis and epibole. Photos also saved in electronic chart.     Today's wound measurements, per RN documentation:  Wound 20 #2, Sacrum, Pressure Injury, Stage 4, Onset 2020-Wound Length (cm): 0.6 cm    Wound 20 #2, Sacrum, Pressure Injury, Stage 4, Onset 5/2020-Wound Width (cm): 0.4 cm    Wound 12/18/20 #2, Sacrum, Pressure Injury, Stage 4, Onset 5/2020-Wound Depth (cm): 0.1 cm    Assessment:     Patient Active Problem List   Diagnosis Code    Rheumatoid arthritis ([facility] [address] M06.9    Psoriasis L40.9    GERD (gastroesophageal reflux disease) K21.9    Anemia D64.9    Cylindrical bronchiectasis ([facility]) J47.9    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    Immunocompromised state ([facility] [address] D84.9    Coronary artery disease I25.10    Bilateral lower extremity edema R60.0    Essential hypertension I10    Lumbar spondylosis M47.816    Mitral valve insufficiency and aortic valve insufficiency I08.0    Mixed hyperlipidemia E78.2    Myopia of both eyes H52.13    Osteoporosis M81.0    Other chronic sinusitis J32.8    Primary open angle glaucoma (POAG) of both eyes, mild stage H40.1131    Primary osteoarthritis of right hip M16.11    Type 2 diabetes mellitus with unspecified diabetic retinopathy without macular edema ([facility]) E11.319    Type 2 diabetes mellitus with diabetic peripheral angiopathy without gangrene, with long-term current use of insulin ([facility]) E11.51, Z79.4    Pressure ulcer of coccygeal region, stage 4 ([facility]) L89.154    Mild malnutrition ([facility]) E44.1    Chronic diastolic congestive heart failure ([facility]) I50.32    Chronic obstructive pulmonary disease ([facility]) J44.9    Hypergranulation L92.9    Nonrheumatic mitral (valve) insufficiency I34.0    Chronic respiratory failure with hypoxia ([facility]) J96.11    Venous insufficiency of left lower extremity I87.2       Assessment of today's active condition(s): RA, multiple other medical problems, decreased mobility, slowly healing stage 4 coccyx pressure ulcer, good week this week. Factors contributing to occurrence and/or persistence of the chronic ulcer include diabetes, chronic pressure, decreased mobility, shear force, and immunosuppression. Medical necessity of today's visit is shown by the above documentation.  Sharp debridement is indicated today, based upon the exam findings in the wound(s) above. Procedure note:     Consent obtained. Time out performed per [facility] policy. Anesthetic  Anesthetic: 4% Lidocaine Cream     Using a curette and forceps, I sharply debrided the coccyx ulcer(s) down through and including the removal of dermis. The type(s) of tissue debrided included fibrin, biofilm, and necrotic/eschar. Total Surface Area Debrided: 1 sq cm. The ulcers were then irrigated with normal saline solution. The procedure was completed with a small amount of bleeding, and hemostasis was with pressure. The patient tolerated the procedure well, with no significant complications. The patient's level of pain during and after the procedure was monitored. Post-debridement measurements, if different from pre-debridement, are in the flowsheet as well. Discharge plan:     Treatment in the wound care center today, per RN documentation: Wound 12/18/20 #2, Sacrum, Pressure Injury, Stage 4, Onset 5/2020-Dressing/Treatment: Other (comment) (Betadine to maegan, ZincOxide to maegan,CollgenAg tucked into depth of wound and into undermining,fluff gauze, Mepilex). Continue focus on protein intake, glucose control, offloading. Home treatment: good handwashing before and after any dressing changes. Cleanse wound with saline or soap & water before dressing change. May use Vaseline (petrolatum), Aquaphor, Aveeno, CeraVe, Cetaphil, Eucerin, Lubriderm, etc for dry skin. Dressing type for home:  Vashe then as above , three times weekly. Written discharge instructions given to patient. Follow up in 2 weeks.     Electronically signed by Yulisa Verma MD on 12/4/2022 at 2:37 PM.

## 2022-12-12 ENCOUNTER — OFFICE VISIT (OUTPATIENT)
Dept: PULMONOLOGY | Age: 72
End: 2022-12-12
Payer: MEDICARE

## 2022-12-12 ENCOUNTER — HOSPITAL ENCOUNTER (OUTPATIENT)
Dept: CT IMAGING | Age: 72
Discharge: HOME | End: 2022-12-12
Payer: MEDICARE

## 2022-12-12 VITALS
DIASTOLIC BLOOD PRESSURE: 60 MMHG | HEIGHT: 68 IN | BODY MASS INDEX: 26.07 KG/M2 | SYSTOLIC BLOOD PRESSURE: 114 MMHG | WEIGHT: 172 LBS | HEART RATE: 84 BPM | OXYGEN SATURATION: 98 % | RESPIRATION RATE: 16 BRPM

## 2022-12-12 DIAGNOSIS — J44.9 CHRONIC OBSTRUCTIVE PULMONARY DISEASE, UNSPECIFIED COPD TYPE (HCC): Primary | ICD-10-CM

## 2022-12-12 DIAGNOSIS — R93.89 ABNORMAL CT OF THE CHEST: ICD-10-CM

## 2022-12-12 PROCEDURE — 3078F DIAST BP <80 MM HG: CPT | Performed by: INTERNAL MEDICINE

## 2022-12-12 PROCEDURE — 3074F SYST BP LT 130 MM HG: CPT | Performed by: INTERNAL MEDICINE

## 2022-12-12 PROCEDURE — 99214 OFFICE O/P EST MOD 30 MIN: CPT | Performed by: INTERNAL MEDICINE

## 2022-12-12 PROCEDURE — 1123F ACP DISCUSS/DSCN MKR DOCD: CPT | Performed by: INTERNAL MEDICINE

## 2022-12-12 PROCEDURE — 71250 CT THORAX DX C-: CPT

## 2022-12-12 NOTE — PROGRESS NOTES
PULMONARY, CRITICAL CARE AND SLEEP MEDICINE   CC: Cough    Interval History 12/12/22: had f/u CT scan with resolution of LLL collapse, doing well, daliresp is now generic    Previous history: 61 yo with 2 month h/o severe waxing/waning cough, tx'd with avelox w/o improvement & then changed to Zpac, some improvement; then treated with prednisone & Zpac & then clearly better, then returned & retreated with cough medicine & prednisone. No cough prior to 3 months ago except with sinus drainage. Cough has slowly been improving since last visit here. DATA:   Blood pressure 114/60, pulse 84, resp. rate 16, height 5' 8\" (1.727 m), weight 172 lb (78 kg), SpO2 98 %. 'on RA  Constitutional:  No acute distress. HENT:  Oropharynx is clear and moist.   Neck: No tracheal deviation present. Cardiovascular: Normal heart sounds. No lower extremity edema. Pulmonary/Chest: few wheezes. No rhonchi. No rales. No decreased breath sounds. No accessory muscle usage or stridor. Musculoskeletal: No cyanosis. No clubbing. Skin: Skin is warm and dry. Psychiatric: Normal mood and affect. Neurologic: speech fluent, alert and oriented, strength symmetric        DATA:   PFT 1/2012   FEV1 2.2 L 77% nl ratio TLC 84% DLCO 18.29 88%  PFT 4/22/2013 FEV1 2.26 L 70%  TLC 92% DLCO 19.08 69%  PFT Mar 2014  FEV1 2.08 L 65%  TLC 5.63 88% DLCO 20.05 73%  PFT June 2014 FEV1 2.12 L 69%  TLC 6.14 99% DLCO 18.31 68%  PFT April 2016 FEV1 2.24 L FVC 3.2 L TLC 6.36 l DLCO 19.16 72%    Pertussis antibodies are positive    CPAP titration 8/20/19 CPAP 7  PSG 7/17/19 AHI 8, but AHI 46 with REM    CABG 5/3/2021   Urgent coronary bypass grafting surgery x3 with single greater saphenous vein graft to the posterior ventricular branch of the right coronary artery, separate single greater saphenous vein graft to the second obtuse marginal branch of circumflex, pedicled left internal artery to the LAD.  Left atrial appendage obliteration    ABHISHEK  2/22/22  LVEF 50-55%  Mild/mod mitral regurgitation  Left atrium mildly dilated      CT CHEST 12/12/22  Mediastinum: A small-to-moderate hiatal hernia is noted. Status post median   sternotomy, CABG and left atrial appendage clip. There are no enlarged   thoracic lymph nodes. Calcified granulomatous disease is noted. Lungs/pleura: Mucous plugging is present within the subsegmental right lower   lobe bronchi. There is no pneumothorax or pleural effusion. Mild emphysema   involves the bilateral lungs with biapical and bibasilar scarring. There is complete resolution of the previously noted left lower lobe   collapse. No change in the 3 mm solid bilateral lower lobe pulmonary   nodules, no follow-up imaging is recommended. Upper Abdomen: Images of the upper abdomen are unremarkable. Soft Tissues/Bones: Degenerative changes involve the thoracic spine. The   bones are demineralized. Status post multilevel vertebral body augmentation   of the lumbar spine. There are old healed bilateral rib fractures. Impression   1. Complete resolution of the previously noted left lower lobe collapse. ASSESSMENT:   Abnormal CT has resolved  COPD/Chronic bronchitis/cough with cylindrical bronchiectasis    Centrilobular pulmonary emphysema  H/O COVID 11/19/49 complicated by MRSA pneumonia   Mild DANIELLE/REM related sleep disordered breathing - recently restarted CPAP with benefit   CAD s/p CABG 5/3/21  Tracheomalacia    Lower extremity edema  Pulmonary Nodules have resolved  Rheumatoid arthritis on Embrel and MTX and 4 mg prednisone  Positive tobacco history, 20 pack year quit in 1991  Diabetes    PLAN:   Continue azithromycin daily  Continue Daliresp; off Singulair  -- Failed Singulair, Failed Dulera, Failed Spiriva, Failed Advair. I recommended regular use of CPAP.      F/U will be with me in 4-6 months

## 2022-12-12 NOTE — DISCHARGE INSTRUCTIONS
[address] [facility] Physician Orders and Discharge [address]  [facility] [address]  [place], [facility]  Telephone: [phone]      Fax: [phone]        Your home care company:   [employer] Ponce [phone]      Your wound-care supplies will be provided by: [facility] AT [facility] orders supplies through [facility]. Please note, depending on your insurance coverage, you may have out-of-pocket expenses for these supplies. Someone from [place] may call you to confirm your order and discuss those potential costs before they ship your products -- please anticipate that call. If your out-of-pocket cost is going to be less than $200, and [facility] cannot reach you, they may ship your supplies as soon as the order is processed, and will send you a bill. If your out-of-pocket cost is going to be more than $200, [facility] should not ship your supplies until they speak with you. If you have any questions about your supplies or your potential out-of-pocket costs, or if you need to place an order for a refill of supplies (typically monthly), [place] is your local representative, and can be reached at [phone]. Information on [facility]'s return policy will also be enclosed with your supplies -- please read that carefully before opening your items. NAME:  Judy Taveras   YOB: 1950  PRIMARY DIAGNOSIS FOR WOUND CARE CENTER:  Pressure ulcer     Wound cleansing:  Do not scrub or use excessive force. Wash hands with soap and water before and after dressing changes. Prior to applying a clean dressing, cleanse wound with normal saline, wound cleanser, or mild soap and water. Ask your physician or nurse before getting the wound(s) wet in the shower.                 Wound care for home:      Sacral Wound:  Betadine to maegan wound   Triad or Zinc Oxide to maegan-wound  Multidex Powder then Collagen with silver to wound bed & tuck into depth of wound and into undermining towards patient's left (9 o'clock)  Fluff gauze over wound  Cover with small sacral mepilex border  C to change dressing on Monday, Wednesday, Friday next week        Please note, all wounds (unless stated otherwise here) were mechanically debrided at the time of cleansing here in the wound-care center today, so a small amount of pain, drainage or bleeding from that process might be expected, and is normal.     All products for home use, including multiple products for a single wound if applicable, are medically necessary in order to achieve the best chance at timely wound healing. See provider documentation for details if needed. Substituted dressings applied in the [address] [place] [address],[address] today, if applicable:           New orders for this week (labs, imaging, medications, etc.):           Additional instructions for specific diagnoses:     +ROHO cushion- use at [address] when sitting!!  +True Balance Air group II mattress      General comments for pressure ulcers: *  Make sure you stay well-hydrated, and maintain good protein intake. *  Reposition at least every two hours, to keep from having pressure in one spot for too long. *  If you are not sure whether you have the best offloading surfaces (mattress, mattress overlay, chair cushion, heel-protector boots, etc), please ask. *  Moisturize your skin regularly with Vaseline, Aquaphor, Aveeno, CeraVe, Cetaphil, Eucerin, Lubriderm, etc; but keep the skin between your toes dry. *  If you smoke, your wound can not heal properly -- please talk with us when you're ready to quit. F/U Appointment is with Dr. Holger Mckeon in 2 weeks on                                              at                       .     Your nurse  is Jason Sampson,     If we applied slip-resistant hospital socks today, be sure to remove them at least once a day to inspect your toes or feet, even if you're not changing the wraps or dressings underneath.  If you see anything concerning (redness, excess moisture, etc), please call and let us know right away.      Should you experience any significant changes in your wound(s) (including redness, increased warmth, increased pain, increased drainage, odor, or fever) or have questions about your wound care, please contact the [address] [place] [address] at [phone] Monday-Thursday from 8:00 am - 4:30 pm, or Friday from 8:00 am - 2:30 pm.  If you need help with your wound outside these hours and cannot wait until we are again available, contact your home-care company (if applicable), your PCP, or go to the nearest emergency room

## 2022-12-16 ENCOUNTER — HOSPITAL ENCOUNTER (OUTPATIENT)
Dept: WOUND CARE | Age: 72
Discharge: HOME | End: 2022-12-16
Payer: MEDICARE

## 2022-12-16 VITALS
BODY MASS INDEX: 26.07 KG/M2 | SYSTOLIC BLOOD PRESSURE: 115 MMHG | TEMPERATURE: 98.7 F | HEART RATE: 89 BPM | HEIGHT: 68 IN | DIASTOLIC BLOOD PRESSURE: 68 MMHG | RESPIRATION RATE: 18 BRPM | WEIGHT: 172 LBS

## 2022-12-16 DIAGNOSIS — I87.2 VENOUS INSUFFICIENCY OF LEFT LOWER EXTREMITY: Primary | ICD-10-CM

## 2022-12-16 DIAGNOSIS — L89.154 PRESSURE ULCER OF COCCYGEAL REGION, STAGE 4 (HCC): ICD-10-CM

## 2022-12-16 DIAGNOSIS — L92.9 HYPERGRANULATION: ICD-10-CM

## 2022-12-16 PROCEDURE — 97597 DBRDMT OPN WND 1ST 20 CM/<: CPT

## 2022-12-16 RX ORDER — LIDOCAINE HYDROCHLORIDE 40 MG/ML
SOLUTION TOPICAL ONCE
OUTPATIENT
Start: 2022-12-16 | End: 2022-12-16

## 2022-12-16 RX ORDER — LIDOCAINE 40 MG/G
CREAM TOPICAL ONCE
OUTPATIENT
Start: 2022-12-16 | End: 2022-12-16

## 2022-12-16 RX ORDER — BACITRACIN ZINC AND POLYMYXIN B SULFATE 500; 1000 [USP'U]/G; [USP'U]/G
OINTMENT TOPICAL ONCE
OUTPATIENT
Start: 2022-12-16 | End: 2022-12-16

## 2022-12-16 RX ORDER — LIDOCAINE 50 MG/G
OINTMENT TOPICAL ONCE
OUTPATIENT
Start: 2022-12-16 | End: 2022-12-16

## 2022-12-16 RX ORDER — LIDOCAINE 40 MG/G
CREAM TOPICAL ONCE
Status: DISCONTINUED | OUTPATIENT
Start: 2022-12-16 | End: 2022-12-17 | Stop reason: HOSPADM

## 2022-12-16 NOTE — PLAN OF CARE
Patient seen in [address] [place] [address],[address] for follow up. Wound appears healthy. No s/s of infection. Wound debridement per Dr. Fidencio Bhatti. Patient tolerated well. No change in care plan this week. F/u in [address] [place] [address],[address] in 2 weeks as ordered, pt. Aware to call sooner with any changes or questions/concerns. Discharge instructions reviewed with patient, all questions answered, copy given to patient. Dressings were applied to all wounds per M.D. Instructions at this visit.

## 2022-12-19 LAB
BASOPHILS ABSOLUTE: 0 K/UL (ref 0–0.2)
BASOPHILS RELATIVE PERCENT: 0.6 %
EOSINOPHILS ABSOLUTE: 0.3 K/UL (ref 0–0.6)
EOSINOPHILS RELATIVE PERCENT: 4.7 %
HCT VFR BLD CALC: 29.8 % (ref 36–48)
HEMOGLOBIN: 9.9 G/DL (ref 12–16)
LYMPHOCYTES ABSOLUTE: 0.8 K/UL (ref 1–5.1)
LYMPHOCYTES RELATIVE PERCENT: 13 %
MCH RBC QN AUTO: 29.7 PG (ref 26–34)
MCHC RBC AUTO-ENTMCNC: 33.3 G/DL (ref 31–36)
MCV RBC AUTO: 89.2 FL (ref 80–100)
MONOCYTES ABSOLUTE: 0.4 K/UL (ref 0–1.3)
MONOCYTES RELATIVE PERCENT: 5.5 %
NEUTROPHILS ABSOLUTE: 4.9 K/UL (ref 1.7–7.7)
NEUTROPHILS RELATIVE PERCENT: 76.2 %
PDW BLD-RTO: 14 % (ref 12.4–15.4)
PLATELET # BLD: 258 K/UL (ref 135–450)
PMV BLD AUTO: 8.3 FL (ref 5–10.5)
RBC # BLD: 3.34 M/UL (ref 4–5.2)
WBC # BLD: 6.4 K/UL (ref 4–11)

## 2022-12-20 LAB
FERRITIN: 166.3 NG/ML (ref 15–150)
FOLATE: 10.5 NG/ML (ref 4.78–24.2)
IRON SATURATION: 31 % (ref 15–50)
IRON: 74 UG/DL (ref 37–145)
TOTAL IRON BINDING CAPACITY: 241 UG/DL (ref 260–445)
URIC ACID, SERUM: 8 MG/DL (ref 2.6–6)
VITAMIN B-12: 1285 PG/ML (ref 211–911)

## 2022-12-27 NOTE — DISCHARGE INSTRUCTIONS
215 [facility] Physician Orders and Discharge [address], [address]  [place], [facility]  Telephone: [phone]      Fax: [phone]        Your home care company:   Hong Ponce [phone]      Your wound-care supplies will be provided by: [facility] orders supplies through [facility]. Please note, depending on your insurance coverage, you may have out-of-pocket expenses for these supplies. Someone from [place] may call you to confirm your order and discuss those potential costs before they ship your products -- please anticipate that call. If your out-of-pocket cost is going to be less than $200, and [place] cannot reach you, they may ship your supplies as soon as the order is processed, and will send you a bill. If your out-of-pocket cost is going to be more than $200, [facility] should not ship your supplies until they speak with you. If you have any questions about your supplies or your potential out-of-pocket costs, or if you need to place an order for a refill of supplies (typically monthly), Lakisha Shoemaker is your local representative, and can be reached at [phone]. Information on [facility]'s return policy will also be enclosed with your supplies -- please read that carefully before opening your items. NAME:  Grazyna Taveras   YOB: 1950  PRIMARY DIAGNOSIS FOR WOUND CARE CENTER:  Pressure ulcer     Wound cleansing:  Do not scrub or use excessive force. Wash hands with soap and water before and after dressing changes. Prior to applying a clean dressing, cleanse wound with normal saline, wound cleanser, or mild soap and water. Ask your physician or nurse before getting the wound(s) wet in the shower.                 Wound care for home:      Sacral Wound:  Betadine to maegan wound   Triad or Zinc Oxide to maegan-wound  Multidex Powder then Collagen with silver to wound bed & tuck into depth of wound and into undermining towards patient's left (9 o'clock)  Fluff gauze over wound  Cover with small sacral mepilex border  C to change dressing on Monday, Wednesday, Friday next week        Please note, all wounds (unless stated otherwise here) were mechanically debrided at the time of cleansing here in the wound-care center today, so a small amount of pain, drainage or bleeding from that process might be expected, and is normal.     All products for home use, including multiple products for a single wound if applicable, are medically necessary in order to achieve the best chance at timely wound healing. See provider documentation for details if needed. Substituted dressings applied in the [address] [place] [address],[address] today, if applicable:           New orders for this week (labs, imaging, medications, etc.):           Additional instructions for specific diagnoses:     +ROHO cushion- use at [address] when sitting!!  +True Balance Air group II mattress      General comments for pressure ulcers: *  Make sure you stay well-hydrated, and maintain good protein intake. *  Reposition at least every two hours, to keep from having pressure in one spot for too long. *  If you are not sure whether you have the best offloading surfaces (mattress, mattress overlay, chair cushion, heel-protector boots, etc), please ask. *  Moisturize your skin regularly with Vaseline, Aquaphor, Aveeno, CeraVe, Cetaphil, Eucerin, Lubriderm, etc; but keep the skin between your toes dry. *  If you smoke, your wound can not heal properly -- please talk with us when you're ready to quit. F/U Appointment is with Dr. Porfirio Barber in 2 weeks on                                              at                       .     Your nurse  is Marcela Shafer,     If we applied slip-resistant hospital socks today, be sure to remove them at least once a day to inspect your toes or feet, even if you're not changing the wraps or dressings underneath.  If you see anything concerning (redness, excess moisture, etc), please call and let us know right away.      Should you experience any significant changes in your wound(s) (including redness, increased warmth, increased pain, increased drainage, odor, or fever) or have questions about your wound care, please contact the [place] [address] at [phone] Monday-Thursday from 8:00 am - 4:30 pm, or Friday from 8:00 am - 2:30 pm.  If you need help with your wound outside these hours and cannot wait until we are again available, contact your home-care company (if applicable), your PCP, or go to the nearest emergency room

## 2022-12-28 NOTE — PROGRESS NOTES
[facility] Progress Note    Ruy Holloway     : 1950    DATE OF VISIT:  2022    Subjective:     Ruy Holloway is a 71 y.o. female who has a pressure ulcer located on the coccyx. Significant symptoms or pertinent wound history since last visit: feeling ok overall, little wound pain, not a lot of drainage, no fever, eating well, doing her best to stay offloaded. Additional ulcer(s) noted? no.      Her current medication list consists of Acapella, DULoxetine, Denosumab, Etanercept, Insulin Syringe-Needle U-100, Menthol (Topical Analgesic), Roflumilast, albuterol sulfate HFA, atorvastatin, azithromycin, blood glucose test strips, calcium carbonate, cetirizine, clopidogrel, cyclobenzaprine, docusate sodium, fluticasone, gabapentin, insulin glargine, insulin lispro, ipratropium, latanoprost, metoprolol succinate, morphine, naloxone, omeprazole, ondansetron, oxyCODONE-acetaminophen, predniSONE, sacubitril-valsartan, spironolactone, torsemide, and vitamin D. Allergies: Atenolol    Objective:     Vitals:    22 1022   BP: 115/68   Pulse: 89   Resp: 18   Temp: 98.7 °F (37.1 °C)   TempSrc: Oral   Weight: 172 lb (78 kg)   Height: 5' 8\" (1.727 m)     AAOx3, fatigued, NAD   No cellulitis, angitis, fluctuance  No acute arthritis or bursitis  No contact dermatitis or cutaneous Candidiasis  Blanca-ulcer skin: indurated, pink, warm, now less maceration and hyperkeratosis, which overhangs a couple of wound edges  Ulcer(s):  about the same size, mostly red and granular, a bit of depth, fibrin, biofilm, a bit of persistent tunneling or undermining to one side again (now more like 9:00). Photos also saved in electronic chart.     Today's wound measurements, per RN documentation:  Wound 20 #2, Sacrum, Pressure Injury, Stage 4, Onset 2020-Wound Length (cm): 0.2 cm    Wound 20 #2, Sacrum, Pressure Injury, Stage 4, Onset 2020-Wound Width (cm): 0.3 cm    Wound 20 #2, Sacrum, Pressure Injury, Stage 4, Onset 5/2020-Wound Depth (cm): 0.2 cm    Assessment:     Patient Active Problem List   Diagnosis Code    Rheumatoid arthritis ([facility] M06.9    Psoriasis L40.9    GERD (gastroesophageal reflux disease) K21.9    Anemia D64.9    Cylindrical bronchiectasis (HCC) J47.9    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    Immunocompromised state ([facility] D84.9    Coronary artery disease I25.10    Bilateral lower extremity edema R60.0    Essential hypertension I10    Lumbar spondylosis M47.816    Mitral valve insufficiency and aortic valve insufficiency I08.0    Mixed hyperlipidemia E78.2    Myopia of both eyes H52.13    Osteoporosis M81.0    Other chronic sinusitis J32.8    Primary open angle glaucoma (POAG) of both eyes, mild stage H40.1131    Primary osteoarthritis of right hip M16.11    Type 2 diabetes mellitus with unspecified diabetic retinopathy without macular edema ([facility]) E11.319    Type 2 diabetes mellitus with diabetic peripheral angiopathy without gangrene, with long-term current use of insulin ([facility]) E11.51, Z79.4    Pressure ulcer of coccygeal region, stage 4 ([facility]) L89.154    Mild malnutrition ([facility]) E44.1    Chronic diastolic congestive heart failure ([facility]) I50.32    Chronic obstructive pulmonary disease ([facility]) J44.9    Hypergranulation L92.9    Nonrheumatic mitral (valve) insufficiency I34.0    Chronic respiratory failure with hypoxia ([facility]) J96.11    Venous insufficiency of left lower extremity I87.2       Assessment of today's active condition(s): RA, decreased mobility, small but slowly healing stage 4 coccyx pressure ulcer, I think with that one small focus of undermining or tunneling being the biggest obstacle to healing now; in another patient I would be tempted to excise that overhanging tissue, but she heals so slowly that I'm trying to avoid that if possible, trusting that she might continue to granulate beneath that, like she has to an extent in the past; no signs of infection.  Factors contributing to occurrence and/or persistence of the chronic ulcer include diabetes, chronic pressure, decreased mobility, shear force, and immunosuppression. Medical necessity of today's visit is shown by the above documentation. Sharp debridement is indicated today, based upon the exam findings in the wound(s) above. Procedure note:     Consent obtained. Time out performed per [facility] policy. Anesthetic  Anesthetic: 4% Lidocaine Cream     Using a curette, I sharply debrided the coccyx ulcer(s) down through and including the removal of dermis. The type(s) of tissue debrided included fibrin, biofilm, and necrotic/eschar. Total Surface Area Debrided: 1 sq cm. The ulcers were then irrigated with normal saline solution. The procedure was completed with a small amount of bleeding, and hemostasis was with pressure. The patient tolerated the procedure well, with no significant complications. The patient's level of pain during and after the procedure was monitored. Post-debridement measurements, if different from pre-debridement, are in the flowsheet as well. Discharge plan:     Treatment in the wound care center today, per RN documentation: Wound 12/18/20 #2, Sacrum, Pressure Injury, Stage 4, Onset 5/2020-Dressing/Treatment: Other (comment) (Vashe, Betadine to maegan wound, Triad to maegan-wound, Multidex then Collagen with silver to wound, Fluff gauze over wound, mepilex border). Keep focused on protein intake, glucose control, offloading, careful transfers without sliding. Next visit if that area of undermining or tunneling persists unchanged, I'll probably inject a bit of lidocaine and try to get that small troublesome area excised. Home treatment: good handwashing before and after any dressing changes. Cleanse wound with saline or soap & water before dressing change. May use Vaseline (petrolatum), Aquaphor, Aveeno, CeraVe, Cetaphil, Eucerin, Lubriderm, etc for dry skin.      Dressing type for home: as above, three times weekly. Written discharge instructions given to patient. Follow up in 2 weeks.     Electronically signed by Carroll Lomeli MD on 12/28/2022 at 9:58 AM.

## 2022-12-30 ENCOUNTER — HOSPITAL ENCOUNTER (OUTPATIENT)
Dept: WOUND CARE | Age: 72
Discharge: HOME | End: 2022-12-30

## 2023-01-01 ENCOUNTER — TELEPHONE (OUTPATIENT)
Dept: PULMONOLOGY | Age: 73
End: 2023-01-01

## 2023-01-09 NOTE — DISCHARGE INSTRUCTIONS
215 [facility] Physician Orders and Discharge 800 [facility]  [address], [address]  [place], [facility]  Telephone: [phone]      Fax: [phone]        Your home care company:   Hong Ponce [phone]      Your wound-care supplies will be provided by: Ta Yoo orders supplies through [facility]. Please note, depending on your insurance coverage, you may have out-of-pocket expenses for these supplies. Someone from [place] may call you to confirm your order and discuss those potential costs before they ship your products -- please anticipate that call. If your out-of-pocket cost is going to be less than $200, and [place] cannot reach you, they may ship your supplies as soon as the order is processed, and will send you a bill. If your out-of-pocket cost is going to be more than $200, [facility] should not ship your supplies until they speak with you. If you have any questions about your supplies or your potential out-of-pocket costs, or if you need to place an order for a refill of supplies (typically monthly), Cierra Stanton is your local representative, and can be reached at [phone]. Information on [facility]'s return policy will also be enclosed with your supplies -- please read that carefully before opening your items. NAME:  Jay Taveras   YOB: 1950  PRIMARY DIAGNOSIS FOR WOUND CARE CENTER:  Pressure ulcer     Wound cleansing:  Do not scrub or use excessive force. Wash hands with soap and water before and after dressing changes. Prior to applying a clean dressing, cleanse wound with normal saline, wound cleanser, or mild soap and water. Ask your physician or nurse before getting the wound(s) wet in the shower.                 Wound care for home:      Sacral Wound:  Betadine to maegan wound   Triad or Zinc Oxide to maegan-wound  Multidex Powder then Collagen with silver to wound bed & tuck into depth of wound and into undermining towards patient's left (9 o'clock)  Fluff gauze over wound  Cover with small sacral mepilex border  [facility] to change dressing on Monday, Wednesday, Friday next week        Please note, all wounds (unless stated otherwise here) were mechanically debrided at the time of cleansing here in the wound-care center today, so a small amount of pain, drainage or bleeding from that process might be expected, and is normal.     All products for home use, including multiple products for a single wound if applicable, are medically necessary in order to achieve the best chance at timely wound healing. See provider documentation for details if needed. Substituted dressings applied in the [address] [place] [address],[address] today, if applicable:           New orders for this week (labs, imaging, medications, etc.):       Additional instructions for specific diagnoses:    Dr. Cordell Mena may have to cut a bit of tissue away from wound at the next visit. This will require a Lidocaine injection to numb area. +ROHO cushion- use at ALL TIMES when sitting!!  +True Balance Air group II mattress      General comments for pressure ulcers: *  Make sure you stay well-hydrated, and maintain good protein intake. *  Reposition at least every two hours, to keep from having pressure in one spot for too long. *  If you are not sure whether you have the best offloading surfaces (mattress, mattress overlay, chair cushion, heel-protector boots, etc), please ask. *  Moisturize your skin regularly with Vaseline, Aquaphor, Aveeno, CeraVe, Cetaphil, Eucerin, Lubriderm, etc; but keep the skin between your toes dry. *  If you smoke, your wound can not heal properly -- please talk with us when you're ready to quit.         F/U Appointment is with Dr. Cordell Mena in 3 weeks on                                              at                       .     Your nurse  is Cindy Odonnell,     If we applied slip-resistant hospital socks today, be sure to remove them at least once a day to inspect your toes or feet, even if you're not changing the wraps or dressings underneath. If you see anything concerning (redness, excess moisture, etc), please call and let us know right away.      Should you experience any significant changes in your wound(s) (including redness, increased warmth, increased pain, increased drainage, odor, or fever) or have questions about your wound care, please contact the [email or web address] at [phone] Monday-Thursday from 8:00 am - 4:30 pm, or Friday from 8:00 am - 2:30 pm.  If you need help with your wound outside these hours and cannot wait until we are again available, contact your home-care company (if applicable), your PCP, or go to the nearest emergency room

## 2023-01-13 ENCOUNTER — HOSPITAL ENCOUNTER (OUTPATIENT)
Dept: WOUND CARE | Age: 73
Discharge: HOME | End: 2023-01-13
Payer: MEDICARE

## 2023-01-13 VITALS
BODY MASS INDEX: 26.83 KG/M2 | RESPIRATION RATE: 18 BRPM | HEIGHT: 68 IN | TEMPERATURE: 98.1 F | HEART RATE: 78 BPM | DIASTOLIC BLOOD PRESSURE: 78 MMHG | WEIGHT: 177 LBS | SYSTOLIC BLOOD PRESSURE: 128 MMHG

## 2023-01-13 DIAGNOSIS — L92.9 HYPERGRANULATION: ICD-10-CM

## 2023-01-13 DIAGNOSIS — I87.2 VENOUS INSUFFICIENCY OF LEFT LOWER EXTREMITY: Primary | ICD-10-CM

## 2023-01-13 DIAGNOSIS — L89.154 PRESSURE ULCER OF COCCYGEAL REGION, STAGE 4 (HCC): ICD-10-CM

## 2023-01-13 PROCEDURE — 97597 DBRDMT OPN WND 1ST 20 CM/<: CPT

## 2023-01-13 RX ORDER — LIDOCAINE HYDROCHLORIDE 40 MG/ML
SOLUTION TOPICAL ONCE
OUTPATIENT
Start: 2023-01-13 | End: 2023-01-13

## 2023-01-13 RX ORDER — BACITRACIN ZINC AND POLYMYXIN B SULFATE 500; 1000 [USP'U]/G; [USP'U]/G
OINTMENT TOPICAL ONCE
OUTPATIENT
Start: 2023-01-13 | End: 2023-01-13

## 2023-01-13 RX ORDER — LIDOCAINE 40 MG/G
CREAM TOPICAL ONCE
OUTPATIENT
Start: 2023-01-13 | End: 2023-01-13

## 2023-01-13 RX ORDER — LIDOCAINE 50 MG/G
OINTMENT TOPICAL ONCE
OUTPATIENT
Start: 2023-01-13 | End: 2023-01-13

## 2023-01-13 RX ORDER — LIDOCAINE 40 MG/G
CREAM TOPICAL ONCE
Status: DISCONTINUED | OUTPATIENT
Start: 2023-01-13 | End: 2023-01-14 | Stop reason: HOSPADM

## 2023-01-13 ASSESSMENT — PAIN DESCRIPTION - PROGRESSION: CLINICAL_PROGRESSION: GRADUALLY IMPROVING

## 2023-01-13 NOTE — PLAN OF CARE
Patient seen in [address] [place] [address],[address] today for follow up. She reports no problems with wound over the past two weeks. No pain or excessive drainage noted. Wound debridement per Dr. Fidencio Bhatti. Dr. Fidencio Bhatti may have to cut a bit of tissue away from wound at the next visit. This will require a Lidocaine injection to numb area. She does state her joints \"flared\" this week. She also became confused during this time. Blood pressure was noted to be low, 88/40. She is currently on a Medrol taper pack. She is feeling better. She states her sister's  passed last week so she was more active because of this. She has developed a new joint cyst on her right wrist. It has been present since December but has recently gotten much larger. She has appointment with ortho on 1/27/23. F/u in [address] [place] [address],[address] in 3 weeks as ordered, pt. Aware to call sooner with any changes or questions/concerns. Discharge instructions reviewed with patient, all questions answered, copy given to patient. Dressings were applied to all wounds per M.D. Instructions at this visit.

## 2023-01-13 NOTE — PLAN OF CARE
215 [facility] Physician Orders and Discharge [address], [address]  [place], [facility]  Telephone: [phone]      Fax: [phone]        Your home care company:   Hong Ponce [phone]      Your wound-care supplies will be provided by: Ta Yoo orders supplies through [facility]. Please note, depending on your insurance coverage, you may have out-of-pocket expenses for these supplies. Someone from [place] may call you to confirm your order and discuss those potential costs before they ship your products -- please anticipate that call. If your out-of-pocket cost is going to be less than $200, and [facility] cannot reach you, they may ship your supplies as soon as the order is processed, and will send you a bill. If your out-of-pocket cost is going to be more than $200, [facility] should not ship your supplies until they speak with you. If you have any questions about your supplies or your potential out-of-pocket costs, or if you need to place an order for a refill of supplies (typically monthly), Analilia Parsons is your local representative, and can be reached at [phone]. Information on [facility]'s return policy will also be enclosed with your supplies -- please read that carefully before opening your items. NAME:  Lise Taveras   YOB: 1950  PRIMARY DIAGNOSIS FOR WOUND CARE CENTER:  Pressure ulcer     Wound cleansing:  Do not scrub or use excessive force. Wash hands with soap and water before and after dressing changes. Prior to applying a clean dressing, cleanse wound with normal saline, wound cleanser, or mild soap and water. Ask your physician or nurse before getting the wound(s) wet in the shower.                 Wound care for home:      Sacral Wound:  Betadine to maegan wound   Triad or Zinc Oxide to maegan-wound  Multidex Powder then Collagen with silver to wound bed & tuck into depth of wound and into undermining towards patient's left (9 o'clock)  Fluff gauze over wound  Cover with small sacral mepilex border  C to change dressing on Monday, Wednesday, Friday next week        Please note, all wounds (unless stated otherwise here) were mechanically debrided at the time of cleansing here in the wound-care center today, so a small amount of pain, drainage or bleeding from that process might be expected, and is normal.     All products for home use, including multiple products for a single wound if applicable, are medically necessary in order to achieve the best chance at timely wound healing. See provider documentation for details if needed. Substituted dressings applied in the [facility] today, if applicable:           New orders for this week (labs, imaging, medications, etc.):       Additional instructions for specific diagnoses:    Dr. Stefani Diallo may have to cut a bit of tissue away from wound at the next visit. This will require a Lidocaine injection to numb area. +ROHO cushion- use at ALL TIMES when sitting!!  +True Balance Air group II mattress      General comments for pressure ulcers: *  Make sure you stay well-hydrated, and maintain good protein intake. *  Reposition at least every two hours, to keep from having pressure in one spot for too long. *  If you are not sure whether you have the best offloading surfaces (mattress, mattress overlay, chair cushion, heel-protector boots, etc), please ask. *  Moisturize your skin regularly with Vaseline, Aquaphor, Aveeno, CeraVe, Cetaphil, Eucerin, Lubriderm, etc; but keep the skin between your toes dry. *  If you smoke, your wound can not heal properly -- please talk with us when you're ready to quit.         F/U Appointment is with Dr. Stefani Diallo in 3 weeks on                                              at                       .     Your nurse  is Malena Caceres,     If we applied slip-resistant hospital socks today, be sure to remove them at least once a day to inspect your toes or feet, even if you're not changing the wraps or dressings underneath. If you see anything concerning (redness, excess moisture, etc), please call and let us know right away.      Should you experience any significant changes in your wound(s) (including redness, increased warmth, increased pain, increased drainage, odor, or fever) or have questions about your wound care, please contact the [facility] at [phone] Monday-Thursday from 8:00 am - 4:30 pm, or Friday from 8:00 am - 2:30 pm.  If you need help with your wound outside these hours and cannot wait until we are again available, contact your home-care company (if applicable), your PCP, or go to the nearest emergency room

## 2023-01-30 NOTE — DISCHARGE INSTRUCTIONS
215 [facility] Physician Orders and Discharge [address], [address]  [place], [facility]  Telephone: [phone]      Fax: [phone]        Your home care company:   [facility]        Your wound-care supplies will be provided by: [employer] orders supplies through [employer]. Please note, depending on your insurance coverage, you may have out-of-pocket expenses for these supplies. Someone from [place] may call you to confirm your order and discuss those potential costs before they ship your products -- please anticipate that call. If your out-of-pocket cost is going to be less than $200, and [facility] cannot reach you, they may ship your supplies as soon as the order is processed, and will send you a bill. If your out-of-pocket cost is going to be more than $200, [place] should not ship your supplies until they speak with you. If you have any questions about your supplies or your potential out-of-pocket costs, or if you need to place an order for a refill of supplies (typically monthly), Danie Tejada is your local representative, and can be reached at [phone]. Information on [employer]'s return policy will also be enclosed with your supplies -- please read that carefully before opening your items. NAME:  Sue Taveras   YOB: 1950  PRIMARY DIAGNOSIS FOR WOUND CARE CENTER:  Pressure ulcer     Wound cleansing:  Do not scrub or use excessive force. Wash hands with soap and water before and after dressing changes. Prior to applying a clean dressing, cleanse wound with normal saline, wound cleanser, or mild soap and water. Ask your physician or nurse before getting the wound(s) wet in the shower.                 Wound care for home:      Sacral Wound:  Betadine to maegan wound   Triad or Zinc Oxide to maegan-wound  Multidex Powder then Collagen with silver to wound bed & tuck into depth of wound and into undermining   Fluff gauze over wound  Cover with small sacral mepilex border  [facility] to change dressing on Monday, Wednesday, Friday next week        Please note, all wounds (unless stated otherwise here) were mechanically debrided at the time of cleansing here in the wound-care center today, so a small amount of pain, drainage or bleeding from that process might be expected, and is normal.     All products for home use, including multiple products for a single wound if applicable, are medically necessary in order to achieve the best chance at timely wound healing. See provider documentation for details if needed. Substituted dressings applied in the [facility] today, if applicable:           New orders for this week (labs, imaging, medications, etc.):        Additional instructions for specific diagnoses: You may feel some discomfort this afternoon from today's procedure. You may take your pain medication for this if needed. Dr. Alvarez Hernández will send a message to [facility] regarding your Roho cushion     +ROHO cushion- use at [place] [facility] when sitting!!  +True Balance Air group II mattress      General comments for pressure ulcers: *  Make sure you stay well-hydrated, and maintain good protein intake. *  Reposition at least every two hours, to keep from having pressure in one spot for too long. *  If you are not sure whether you have the best offloading surfaces (mattress, mattress overlay, chair cushion, heel-protector boots, etc), please ask. *  Moisturize your skin regularly with Vaseline, Aquaphor, Aveeno, CeraVe, Cetaphil, Eucerin, Lubriderm, etc; but keep the skin between your toes dry. *  If you smoke, your wound can not heal properly -- please talk with us when you're ready to quit.         F/U Appointment is with Dr. Alvarez Hernández in 2 weeks on                                              at                       .     Your nurse  is Saw Evans,     If we applied slip-resistant hospital socks today, be sure to remove them at least once a day to inspect your toes or feet, even if you're not changing the wraps or dressings underneath. If you see anything concerning (redness, excess moisture, etc), please call and let us know right away.      Should you experience any significant changes in your wound(s) (including redness, increased warmth, increased pain, increased drainage, odor, or fever) or have questions about your wound care, please contact the [facility] at [phone] Monday-Thursday from 8:00 am - 4:30 pm, or Friday from 8:00 am - 2:30 pm.  If you need help with your wound outside these hours and cannot wait until we are again available, contact your home-care company (if applicable), your PCP, or go to the nearest emergency room

## 2023-02-03 ENCOUNTER — HOSPITAL ENCOUNTER (OUTPATIENT)
Dept: WOUND CARE | Age: 73
Discharge: HOME | End: 2023-02-03
Payer: MEDICARE

## 2023-02-03 VITALS
HEART RATE: 87 BPM | BODY MASS INDEX: 26.64 KG/M2 | TEMPERATURE: 98.3 F | DIASTOLIC BLOOD PRESSURE: 61 MMHG | SYSTOLIC BLOOD PRESSURE: 148 MMHG | RESPIRATION RATE: 18 BRPM | HEIGHT: 68 IN | WEIGHT: 175.8 LBS

## 2023-02-03 DIAGNOSIS — I87.2 VENOUS INSUFFICIENCY OF LEFT LOWER EXTREMITY: Primary | ICD-10-CM

## 2023-02-03 DIAGNOSIS — L92.9 HYPERGRANULATION: ICD-10-CM

## 2023-02-03 DIAGNOSIS — L89.154 PRESSURE ULCER OF COCCYGEAL REGION, STAGE 4 (HCC): ICD-10-CM

## 2023-02-03 PROCEDURE — 97597 DBRDMT OPN WND 1ST 20 CM/<: CPT

## 2023-02-03 RX ORDER — LIDOCAINE 40 MG/G
CREAM TOPICAL ONCE
OUTPATIENT
Start: 2023-02-03 | End: 2023-02-03

## 2023-02-03 RX ORDER — LIDOCAINE 50 MG/G
OINTMENT TOPICAL ONCE
OUTPATIENT
Start: 2023-02-03 | End: 2023-02-03

## 2023-02-03 RX ORDER — LIDOCAINE 40 MG/G
CREAM TOPICAL ONCE
Status: DISCONTINUED | OUTPATIENT
Start: 2023-02-03 | End: 2023-02-04 | Stop reason: HOSPADM

## 2023-02-03 RX ORDER — LIDOCAINE HYDROCHLORIDE 40 MG/ML
SOLUTION TOPICAL ONCE
OUTPATIENT
Start: 2023-02-03 | End: 2023-02-03

## 2023-02-03 RX ORDER — BACITRACIN ZINC AND POLYMYXIN B SULFATE 500; 1000 [USP'U]/G; [USP'U]/G
OINTMENT TOPICAL ONCE
OUTPATIENT
Start: 2023-02-03 | End: 2023-02-03

## 2023-02-03 NOTE — PLAN OF CARE
Patient seen in [facility] today for follow up. Dr. Stefani Diallo injected patient wound with 1% Lidocaine to remove some fatty tissue within wound. Patient tolerated well. Wound also debrided per Dr. Stefani Diallo. Patient reports her Roho cushion has not been holding air. She has attempted to find where it is leaking from but has been unable to do so. She does not believe it is more than 3year old. Dr. Stefani Diallo will reach out to [facility] about this. F/u in [facility] in 2 weeks as ordered, pt. Aware to call sooner with any changes or questions/concerns. Discharge instructions reviewed with patient, all questions answered, copy given to patient. Dressings were applied to all wounds per M.D. Instructions at this visit.

## 2023-02-03 NOTE — PLAN OF CARE
215 [facility] Physician Orders and Discharge [address], [address]  [place], [facility]  Telephone: [phone]      Fax: [phone]        Your home care company:   [facility]        Your wound-care supplies will be provided by: [facility] AT [facility] orders supplies through [employer]. Please note, depending on your insurance coverage, you may have out-of-pocket expenses for these supplies. Someone from [place] may call you to confirm your order and discuss those potential costs before they ship your products -- please anticipate that call. If your out-of-pocket cost is going to be less than $200, and [facility] cannot reach you, they may ship your supplies as soon as the order is processed, and will send you a bill. If your out-of-pocket cost is going to be more than $200, [facility] should not ship your supplies until they speak with you. If you have any questions about your supplies or your potential out-of-pocket costs, or if you need to place an order for a refill of supplies (typically monthly), [facility] is your local representative, and can be reached at [phone]. Information on [employer]'s return policy will also be enclosed with your supplies -- please read that carefully before opening your items. NAME:  Jeffry Taveras   YOB: 1950  PRIMARY DIAGNOSIS FOR WOUND CARE CENTER:  Pressure ulcer     Wound cleansing:  Do not scrub or use excessive force. Wash hands with soap and water before and after dressing changes. Prior to applying a clean dressing, cleanse wound with normal saline, wound cleanser, or mild soap and water. Ask your physician or nurse before getting the wound(s) wet in the shower.                 Wound care for home:      Sacral Wound:  Betadine to maegan wound   Triad or Zinc Oxide to maegan-wound  Multidex Powder then Collagen with silver to wound bed & tuck into depth of wound and into undermining   Fluff gauze over wound  Cover with small sacral mepilex border  [facility] to change dressing on Monday, Wednesday, Friday next week        Please note, all wounds (unless stated otherwise here) were mechanically debrided at the time of cleansing here in the wound-care center today, so a small amount of pain, drainage or bleeding from that process might be expected, and is normal.     All products for home use, including multiple products for a single wound if applicable, are medically necessary in order to achieve the best chance at timely wound healing. See provider documentation for details if needed. Substituted dressings applied in the [facility] today, if applicable:           New orders for this week (labs, imaging, medications, etc.):        Additional instructions for specific diagnoses: You may feel some discomfort this afternoon from today's procedure. You may take your pain medication for this if needed. Dr. Fermín Hagen will send a message to [facility] regarding your Roho cushion     +ROHO cushion- use at [facility] when sitting!!  +True Balance Air group II mattress      General comments for pressure ulcers: *  Make sure you stay well-hydrated, and maintain good protein intake. *  Reposition at least every two hours, to keep from having pressure in one spot for too long. *  If you are not sure whether you have the best offloading surfaces (mattress, mattress overlay, chair cushion, heel-protector boots, etc), please ask. *  Moisturize your skin regularly with Vaseline, Aquaphor, Aveeno, CeraVe, Cetaphil, Eucerin, Lubriderm, etc; but keep the skin between your toes dry. *  If you smoke, your wound can not heal properly -- please talk with us when you're ready to quit.         F/U Appointment is with Dr. Fermín Hagen in 2 weeks on                                              at                       .     Your nurse  is Maksim Figueroa,     If we applied slip-resistant hospital socks today, be sure to remove them at least once a day to inspect your toes or feet, even if you're not changing the wraps or dressings underneath. If you see anything concerning (redness, excess moisture, etc), please call and let us know right away.

## 2023-02-13 NOTE — DISCHARGE INSTRUCTIONS
215 [facility] Physician Orders and Discharge [address], [address]  [place], [facility]  Telephone: [phone]      Fax: [phone]        Your home care company:   [facility]         Your wound-care supplies will be provided by: [facility] AT [facility] orders supplies through [employer]. Please note, depending on your insurance coverage, you may have out-of-pocket expenses for these supplies. Someone from [place] may call you to confirm your order and discuss those potential costs before they ship your products -- please anticipate that call. If your out-of-pocket cost is going to be less than $200, and [employer] cannot reach you, they may ship your supplies as soon as the order is processed, and will send you a bill. If your out-of-pocket cost is going to be more than $200, [facility] should not ship your supplies until they speak with you. If you have any questions about your supplies or your potential out-of-pocket costs, or if you need to place an order for a refill of supplies (typically monthly), Kaylie Cee is your local representative, and can be reached at [phone]. Information on [employer]'s return policy will also be enclosed with your supplies -- please read that carefully before opening your items. NAME:  Darlin Taveras   YOB: 1950  PRIMARY DIAGNOSIS FOR WOUND CARE CENTER:  Pressure ulcer     Wound cleansing:  Do not scrub or use excessive force. Wash hands with soap and water before and after dressing changes. Prior to applying a clean dressing, cleanse wound with normal saline, wound cleanser, or mild soap and water. Ask your physician or nurse before getting the wound(s) wet in the shower.                 Wound care for home:      Sacral Wound:  Betadine to maegan wound   Triad or Zinc Oxide to maegan-wound  Multidex Powder then Collagen with silver to wound bed & tuck into depth of wound and into undermining   Fluff gauze over wound  Cover with small sacral mepilex border  [facility] to change dressing on Monday, Wednesday, Friday next week        Please note, all wounds (unless stated otherwise here) were mechanically debrided at the time of cleansing here in the wound-care center today, so a small amount of pain, drainage or bleeding from that process might be expected, and is normal.     All products for home use, including multiple products for a single wound if applicable, are medically necessary in order to achieve the best chance at timely wound healing. See provider documentation for details if needed. Substituted dressings applied in the [facility] today, if applicable:           New orders for this week (labs, imaging, medications, etc.):        Additional instructions for specific diagnoses:    Possible RAMON discussed for next visit      Dr. Bhavin Jones will follow up with [facility] regarding your Roho cushion     +ROHO cushion- use at [address] when sitting!!  +True Balance Air group II mattress      General comments for pressure ulcers: *  Make sure you stay well-hydrated, and maintain good protein intake. *  Reposition at least every two hours, to keep from having pressure in one spot for too long. *  If you are not sure whether you have the best offloading surfaces (mattress, mattress overlay, chair cushion, heel-protector boots, etc), please ask. *  Moisturize your skin regularly with Vaseline, Aquaphor, Aveeno, CeraVe, Cetaphil, Eucerin, Lubriderm, etc; but keep the skin between your toes dry. *  If you smoke, your wound can not heal properly -- please talk with us when you're ready to quit.         F/U Appointment is with Dr. Bhavin Jones in 2 weeks on                                              at                       .     Your nurse  is Brianda Harman,     If we applied slip-resistant hospital socks today, be sure to remove them at least once a day to inspect your toes or feet, even if you're not changing the wraps or dressings underneath. If you see anything concerning (redness, excess moisture, etc), please call and let us know right away.      Should you experience any significant changes in your wound(s) (including redness, increased warmth, increased pain, increased drainage, odor, or fever) or have questions about your wound care, please contact the [employer] at [phone] Monday-Thursday from 8:00 am - 4:30 pm, or Friday from 8:00 am - 2:30 pm.  If you need help with your wound outside these hours and cannot wait until we are again available, contact your home-care company (if applicable), your PCP, or go to the nearest emergency room

## 2023-02-17 ENCOUNTER — HOSPITAL ENCOUNTER (INPATIENT)
Age: 73
LOS: 4 days | Discharge: HOME | DRG: 189 | End: 2023-02-21
Attending: STUDENT IN AN ORGANIZED HEALTH CARE EDUCATION/TRAINING PROGRAM | Admitting: INTERNAL MEDICINE
Payer: MEDICARE

## 2023-02-17 ENCOUNTER — APPOINTMENT (OUTPATIENT)
Dept: CT IMAGING | Age: 73
DRG: 189 | End: 2023-02-17
Payer: MEDICARE

## 2023-02-17 ENCOUNTER — APPOINTMENT (OUTPATIENT)
Dept: GENERAL RADIOLOGY | Age: 73
DRG: 189 | End: 2023-02-17
Payer: MEDICARE

## 2023-02-17 ENCOUNTER — HOSPITAL ENCOUNTER (OUTPATIENT)
Dept: WOUND CARE | Age: 73
Discharge: HOME | End: 2023-02-17
Payer: MEDICARE

## 2023-02-17 VITALS
HEIGHT: 68 IN | SYSTOLIC BLOOD PRESSURE: 102 MMHG | TEMPERATURE: 99 F | HEART RATE: 94 BPM | BODY MASS INDEX: 25.94 KG/M2 | RESPIRATION RATE: 18 BRPM | DIASTOLIC BLOOD PRESSURE: 64 MMHG | WEIGHT: 171.2 LBS

## 2023-02-17 DIAGNOSIS — J96.01 ACUTE RESPIRATORY FAILURE WITH HYPOXIA (HCC): ICD-10-CM

## 2023-02-17 DIAGNOSIS — I87.2 VENOUS INSUFFICIENCY OF LEFT LOWER EXTREMITY: Primary | ICD-10-CM

## 2023-02-17 DIAGNOSIS — J44.1 COPD EXACERBATION (HCC): ICD-10-CM

## 2023-02-17 DIAGNOSIS — J18.9 PNEUMONIA OF LEFT LOWER LOBE DUE TO INFECTIOUS ORGANISM: Primary | ICD-10-CM

## 2023-02-17 DIAGNOSIS — L92.9 HYPERGRANULATION: ICD-10-CM

## 2023-02-17 DIAGNOSIS — L89.154 PRESSURE ULCER OF COCCYGEAL REGION, STAGE 4 (HCC): ICD-10-CM

## 2023-02-17 LAB
A/G RATIO: 1 (ref 1.1–2.2)
ALBUMIN SERPL-MCNC: 3.6 G/DL (ref 3.4–5)
ALP BLD-CCNC: 73 U/L (ref 40–129)
ALT SERPL-CCNC: 6 U/L (ref 10–40)
ANION GAP SERPL CALCULATED.3IONS-SCNC: 13 MMOL/L (ref 3–16)
AST SERPL-CCNC: 11 U/L (ref 15–37)
BACTERIA: ABNORMAL /HPF
BASE EXCESS VENOUS: 0.3 MMOL/L (ref -3–3)
BASOPHILS ABSOLUTE: 0.1 K/UL (ref 0–0.2)
BASOPHILS RELATIVE PERCENT: 1.1 %
BILIRUB SERPL-MCNC: 0.5 MG/DL (ref 0–1)
BILIRUBIN URINE: NEGATIVE
BLOOD, URINE: ABNORMAL
BUN BLDV-MCNC: 23 MG/DL (ref 7–20)
CALCIUM SERPL-MCNC: 8.4 MG/DL (ref 8.3–10.6)
CARBOXYHEMOGLOBIN: 5.3 % (ref 0–1.5)
CHLORIDE BLD-SCNC: 97 MMOL/L (ref 99–110)
CLARITY: CLEAR
CO2: 26 MMOL/L (ref 21–32)
COLOR: YELLOW
CREAT SERPL-MCNC: 1.3 MG/DL (ref 0.6–1.2)
EKG ATRIAL RATE: 75 BPM
EKG DIAGNOSIS: NORMAL
EKG P AXIS: 111 DEGREES
EKG P-R INTERVAL: 168 MS
EKG Q-T INTERVAL: 398 MS
EKG QRS DURATION: 104 MS
EKG QTC CALCULATION (BAZETT): 444 MS
EKG R AXIS: 2 DEGREES
EKG T AXIS: 58 DEGREES
EKG VENTRICULAR RATE: 75 BPM
EOSINOPHILS ABSOLUTE: 0.4 K/UL (ref 0–0.6)
EOSINOPHILS RELATIVE PERCENT: 6.1 %
EPITHELIAL CELLS, UA: ABNORMAL /HPF (ref 0–5)
GFR SERPL CREATININE-BSD FRML MDRD: 44 ML/MIN/{1.73_M2}
GLUCOSE BLD-MCNC: 124 MG/DL (ref 70–99)
GLUCOSE BLD-MCNC: 336 MG/DL (ref 70–99)
GLUCOSE BLD-MCNC: 67 MG/DL (ref 70–99)
GLUCOSE URINE: NEGATIVE MG/DL
HCO3 VENOUS: 25.1 MMOL/L (ref 23–29)
HCT VFR BLD CALC: 29.6 % (ref 36–48)
HEMOGLOBIN: 9.6 G/DL (ref 12–16)
HYALINE CASTS: ABNORMAL /LPF (ref 0–2)
INFLUENZA A: NOT DETECTED
INFLUENZA B: NOT DETECTED
KETONES, URINE: NEGATIVE MG/DL
LEUKOCYTE ESTERASE, URINE: NEGATIVE
LYMPHOCYTES ABSOLUTE: 1.2 K/UL (ref 1–5.1)
LYMPHOCYTES RELATIVE PERCENT: 19 %
MCH RBC QN AUTO: 29 PG (ref 26–34)
MCHC RBC AUTO-ENTMCNC: 32.4 G/DL (ref 31–36)
MCV RBC AUTO: 89.6 FL (ref 80–100)
METHEMOGLOBIN VENOUS: 0.3 %
MICROSCOPIC EXAMINATION: YES
MONOCYTES ABSOLUTE: 0.5 K/UL (ref 0–1.3)
MONOCYTES RELATIVE PERCENT: 7.5 %
NEUTROPHILS ABSOLUTE: 4 K/UL (ref 1.7–7.7)
NEUTROPHILS RELATIVE PERCENT: 66.3 %
NITRITE, URINE: NEGATIVE
O2 CONTENT, VEN: 10 VOL %
O2 SAT, VEN: 71 %
O2 THERAPY: ABNORMAL
PCO2, VEN: 41.3 MMHG (ref 40–50)
PDW BLD-RTO: 14.9 % (ref 12.4–15.4)
PERFORMED ON: ABNORMAL
PERFORMED ON: ABNORMAL
PH UA: 6 (ref 5–8)
PH VENOUS: 7.4 (ref 7.35–7.45)
PLATELET # BLD: 214 K/UL (ref 135–450)
PMV BLD AUTO: 7.5 FL (ref 5–10.5)
PO2, VEN: 37.3 MMHG (ref 25–40)
POTASSIUM SERPL-SCNC: 3.8 MMOL/L (ref 3.5–5.1)
PRO-BNP: 2431 PG/ML (ref 0–124)
PROTEIN UA: NEGATIVE MG/DL
RBC # BLD: 3.31 M/UL (ref 4–5.2)
RBC UA: ABNORMAL /HPF (ref 0–4)
SARS-COV-2 RNA, RT PCR: NOT DETECTED
SODIUM BLD-SCNC: 136 MMOL/L (ref 136–145)
SPECIFIC GRAVITY UA: 1.01 (ref 1–1.03)
TCO2 CALC VENOUS: 26 MMOL/L
TOTAL PROTEIN: 7.2 G/DL (ref 6.4–8.2)
TROPONIN: <0.01 NG/ML
URINE TYPE: ABNORMAL
UROBILINOGEN, URINE: 0.2 E.U./DL
WBC # BLD: 6.1 K/UL (ref 4–11)
WBC UA: ABNORMAL /HPF (ref 0–5)

## 2023-02-17 PROCEDURE — 99285 EMERGENCY DEPT VISIT HI MDM: CPT

## 2023-02-17 PROCEDURE — 96372 THER/PROPH/DIAG INJ SC/IM: CPT

## 2023-02-17 PROCEDURE — 93010 ELECTROCARDIOGRAM REPORT: CPT | Performed by: INTERNAL MEDICINE

## 2023-02-17 PROCEDURE — 84484 ASSAY OF TROPONIN QUANT: CPT

## 2023-02-17 PROCEDURE — 6370000000 HC RX 637 (ALT 250 FOR IP): Performed by: STUDENT IN AN ORGANIZED HEALTH CARE EDUCATION/TRAINING PROGRAM

## 2023-02-17 PROCEDURE — 36415 COLL VENOUS BLD VENIPUNCTURE: CPT

## 2023-02-17 PROCEDURE — 96375 TX/PRO/DX INJ NEW DRUG ADDON: CPT

## 2023-02-17 PROCEDURE — 83880 ASSAY OF NATRIURETIC PEPTIDE: CPT

## 2023-02-17 PROCEDURE — 6360000002 HC RX W HCPCS: Performed by: STUDENT IN AN ORGANIZED HEALTH CARE EDUCATION/TRAINING PROGRAM

## 2023-02-17 PROCEDURE — 2580000003 HC RX 258: Performed by: STUDENT IN AN ORGANIZED HEALTH CARE EDUCATION/TRAINING PROGRAM

## 2023-02-17 PROCEDURE — 96367 TX/PROPH/DG ADDL SEQ IV INF: CPT

## 2023-02-17 PROCEDURE — 82803 BLOOD GASES ANY COMBINATION: CPT

## 2023-02-17 PROCEDURE — 85025 COMPLETE CBC W/AUTO DIFF WBC: CPT

## 2023-02-17 PROCEDURE — 96365 THER/PROPH/DIAG IV INF INIT: CPT

## 2023-02-17 PROCEDURE — 93005 ELECTROCARDIOGRAM TRACING: CPT | Performed by: STUDENT IN AN ORGANIZED HEALTH CARE EDUCATION/TRAINING PROGRAM

## 2023-02-17 PROCEDURE — 80053 COMPREHEN METABOLIC PANEL: CPT

## 2023-02-17 PROCEDURE — 81001 URINALYSIS AUTO W/SCOPE: CPT

## 2023-02-17 PROCEDURE — 96366 THER/PROPH/DIAG IV INF ADDON: CPT

## 2023-02-17 PROCEDURE — 97597 DBRDMT OPN WND 1ST 20 CM/<: CPT

## 2023-02-17 PROCEDURE — 71045 X-RAY EXAM CHEST 1 VIEW: CPT

## 2023-02-17 PROCEDURE — 2000000000 HC ICU R&B

## 2023-02-17 PROCEDURE — 71260 CT THORAX DX C+: CPT | Performed by: STUDENT IN AN ORGANIZED HEALTH CARE EDUCATION/TRAINING PROGRAM

## 2023-02-17 PROCEDURE — 6360000004 HC RX CONTRAST MEDICATION: Performed by: STUDENT IN AN ORGANIZED HEALTH CARE EDUCATION/TRAINING PROGRAM

## 2023-02-17 PROCEDURE — 87636 SARSCOV2 & INF A&B AMP PRB: CPT

## 2023-02-17 RX ORDER — LIDOCAINE 40 MG/G
CREAM TOPICAL ONCE
Status: DISCONTINUED | OUTPATIENT
Start: 2023-02-17 | End: 2023-02-18 | Stop reason: HOSPADM

## 2023-02-17 RX ORDER — LIDOCAINE 40 MG/G
CREAM TOPICAL ONCE
OUTPATIENT
Start: 2023-02-17 | End: 2023-02-17

## 2023-02-17 RX ORDER — BACITRACIN ZINC AND POLYMYXIN B SULFATE 500; 1000 [USP'U]/G; [USP'U]/G
OINTMENT TOPICAL ONCE
OUTPATIENT
Start: 2023-02-17 | End: 2023-02-17

## 2023-02-17 RX ORDER — IPRATROPIUM BROMIDE AND ALBUTEROL SULFATE 2.5; .5 MG/3ML; MG/3ML
3 SOLUTION RESPIRATORY (INHALATION) ONCE
Status: COMPLETED | OUTPATIENT
Start: 2023-02-17 | End: 2023-02-17

## 2023-02-17 RX ORDER — LIDOCAINE 50 MG/G
OINTMENT TOPICAL ONCE
OUTPATIENT
Start: 2023-02-17 | End: 2023-02-17

## 2023-02-17 RX ORDER — METHYLPREDNISOLONE SODIUM SUCCINATE 125 MG/2ML
125 INJECTION, POWDER, LYOPHILIZED, FOR SOLUTION INTRAMUSCULAR; INTRAVENOUS ONCE
Status: COMPLETED | OUTPATIENT
Start: 2023-02-17 | End: 2023-02-17

## 2023-02-17 RX ORDER — INSULIN LISPRO 100 [IU]/ML
10 INJECTION, SOLUTION INTRAVENOUS; SUBCUTANEOUS ONCE
Status: COMPLETED | OUTPATIENT
Start: 2023-02-17 | End: 2023-02-17

## 2023-02-17 RX ORDER — LIDOCAINE HYDROCHLORIDE 40 MG/ML
SOLUTION TOPICAL ONCE
OUTPATIENT
Start: 2023-02-17 | End: 2023-02-17

## 2023-02-17 RX ADMIN — INSULIN LISPRO 10 UNITS: 100 INJECTION, SOLUTION INTRAVENOUS; SUBCUTANEOUS at 22:05

## 2023-02-17 RX ADMIN — IOPAMIDOL 85 ML: 755 INJECTION, SOLUTION INTRAVENOUS at 19:25

## 2023-02-17 RX ADMIN — AZITHROMYCIN DIHYDRATE 500 MG: 500 INJECTION, POWDER, LYOPHILIZED, FOR SOLUTION INTRAVENOUS at 17:45

## 2023-02-17 RX ADMIN — IPRATROPIUM BROMIDE AND ALBUTEROL SULFATE 3 AMPULE: 2.5; .5 SOLUTION RESPIRATORY (INHALATION) at 16:55

## 2023-02-17 RX ADMIN — CEFTRIAXONE SODIUM 1000 MG: 1 INJECTION, POWDER, FOR SOLUTION INTRAMUSCULAR; INTRAVENOUS at 17:11

## 2023-02-17 RX ADMIN — METHYLPREDNISOLONE SODIUM SUCCINATE 125 MG: 125 INJECTION, POWDER, FOR SOLUTION INTRAMUSCULAR; INTRAVENOUS at 16:56

## 2023-02-17 ASSESSMENT — PAIN - FUNCTIONAL ASSESSMENT: PAIN_FUNCTIONAL_ASSESSMENT: NONE - DENIES PAIN

## 2023-02-17 NOTE — PLAN OF CARE
Patient seen in [facility] today for follow up. She reports feeling well overall. Wound is showing signs of improvement. Wound bed healthy appearing, however measurements are no smaller. RAMON discussed today. Patient agreeable. If no improvement at next weeks visit, we will consider using this. Patient states she if offloading as much as she can. She has not been able to properly inflate her Roho cushion. Dr. Nia Falcon and patient have been in contact with Roho, but no solution reached. Dr. Nia Falcon will again reach out to [facility]. For now she is using a pillow to help in offloading. F/u in [facility] in 1 week as ordered, pt. Aware to call sooner with any changes or questions/concerns. Discharge instructions reviewed with patient, all questions answered, copy given to patient. Dressings were applied to all wounds per M.D. Instructions at this visit.

## 2023-02-17 NOTE — ED NOTES
Titrated patient down to 8 liters NC.  Patient O2 staying in [facility]  02/17/23 6201

## 2023-02-17 NOTE — PLAN OF CARE
215 [facility] Physician Orders and Discharge [address], [address]  [place], [facility]  Telephone: [phone]      Fax: [phone]        Your home care company:   [facility]         Your wound-care supplies will be provided by: [employer] orders supplies through [employer]. Please note, depending on your insurance coverage, you may have out-of-pocket expenses for these supplies. Someone from [place] may call you to confirm your order and discuss those potential costs before they ship your products -- please anticipate that call. If your out-of-pocket cost is going to be less than $200, and [facility] cannot reach you, they may ship your supplies as soon as the order is processed, and will send you a bill. If your out-of-pocket cost is going to be more than $200, [facility] should not ship your supplies until they speak with you. If you have any questions about your supplies or your potential out-of-pocket costs, or if you need to place an order for a refill of supplies (typically monthly), Rojas Ford is your local representative, and can be reached at [phone]. Information on [employer]'s return policy will also be enclosed with your supplies -- please read that carefully before opening your items. NAME:  Rogelio Taveras   YOB: 1950  PRIMARY DIAGNOSIS FOR WOUND CARE CENTER:  Pressure ulcer     Wound cleansing:  Do not scrub or use excessive force. Wash hands with soap and water before and after dressing changes. Prior to applying a clean dressing, cleanse wound with normal saline, wound cleanser, or mild soap and water. Ask your physician or nurse before getting the wound(s) wet in the shower.                 Wound care for home:      Sacral Wound:  Betadine to maegan wound   Triad or Zinc Oxide to maegan-wound  Multidex Powder then Collagen with silver to wound bed & tuck into depth of wound and into undermining   Fluff gauze over wound  Cover with small sacral mepilex border  [facility] to change dressing on Monday, Wednesday, Friday next week        Please note, all wounds (unless stated otherwise here) were mechanically debrided at the time of cleansing here in the wound-care center today, so a small amount of pain, drainage or bleeding from that process might be expected, and is normal.     All products for home use, including multiple products for a single wound if applicable, are medically necessary in order to achieve the best chance at timely wound healing. See provider documentation for details if needed. Substituted dressings applied in the [facility] today, if applicable:           New orders for this week (labs, imaging, medications, etc.):        Additional instructions for specific diagnoses:    Possible RAMON discussed for next visit      Dr. Graeme Vigil will follow up with [facility] regarding your Roho cushion     +ROHO cushion- use at [address] when sitting!!  +True Balance Air group II mattress      General comments for pressure ulcers: *  Make sure you stay well-hydrated, and maintain good protein intake. *  Reposition at least every two hours, to keep from having pressure in one spot for too long. *  If you are not sure whether you have the best offloading surfaces (mattress, mattress overlay, chair cushion, heel-protector boots, etc), please ask. *  Moisturize your skin regularly with Vaseline, Aquaphor, Aveeno, CeraVe, Cetaphil, Eucerin, Lubriderm, etc; but keep the skin between your toes dry. *  If you smoke, your wound can not heal properly -- please talk with us when you're ready to quit.         F/U Appointment is with Dr. Graeme Vigil in 2 weeks on                                              at                       .     Your nurse  is Mario Love,     If we applied slip-resistant hospital socks today, be sure to remove them at least once a day to inspect your toes or feet, even if you're not changing the wraps or dressings underneath. If you see anything concerning (redness, excess moisture, etc), please call and let us know right away.      Should you experience any significant changes in your wound(s) (including redness, increased warmth, increased pain, increased drainage, odor, or fever) or have questions about your wound care, please contact the [employer] at [phone] Monday-Thursday from 8:00 am - 4:30 pm, or Friday from 8:00 am - 2:30 pm.  If you need help with your wound outside these hours and cannot wait until we are again available, contact your home-care company (if applicable), your PCP, or go to the nearest emergency room

## 2023-02-17 NOTE — ED NOTES
Dr Kerr Cost at bedside. O2 dropped while on 3 liters nasal cannula while talking to Dr Usha Smiley. This RN was in room to complete EKG. Titrated patient O2 to 6 liters, still not bringing patient up from 87%. Patient placed on Non rebreather, by dr Carolin Lind, brought up to 93%. Renea ROSARIO brought in [facility]. Patient O2 is now 100%.       Mani Hyatt, ABRAHAM  02/17/23 415 [facility] Eulogio Blake RN  02/17/23 3008

## 2023-02-17 NOTE — ED PROVIDER NOTES
[address] ED      CHIEF COMPLAINT  Shortness of Breath (C/o cough and congestion for the last week. Increased SOB starting today. O2 at triage 79-81% on RA)       HISTORY OF PRESENT ILLNESS  Jes Bruce is a 71 y.o. female  who presents to the ED complaining of cough and shortness of breath. Patient notes cough and chest congestion for the last week. Started feeling more short of breath over the last couple days. She has been using breathing treatments at home with some improvement. Today she was noted be hypoxic at home at home pulse ox into the 70s. Has not noticed any fevers or chills. States her cough was previously productive of green sputum, but she has not seen any sputum production the last 2 to 3 days. Denies any chest pain. Has chronic leg swelling that she does not feel is worse than usual.  She became severely dyspneic on exertion going to her doctor's office today. No other complaints, modifying factors or associated symptoms. I have reviewed the following from the nursing documentation.     Past Medical History:   Diagnosis Date    Acute on chronic diastolic heart failure due to coronary artery disease (HCC)     Acute respiratory failure with hypoxia (HCC)     Atherosclerosis of native artery of right lower extremity with rest pain ([address] 07/25/2017    Back pain     Branch retinal vein occlusion 07/20/2012    Bronchiectasis with acute exacerbation (HCC)     Cellulitis and abscess of left leg 7/11/2021    Cellulitis of left lower extremity 7/11/2021    S/P vein harvest 05/2021 for CABG    CHF (congestive heart failure) (HCC)     Closed compression fracture of thoracic vertebra ([address] 01/15/2020    Closed fracture of facial bone with routine healing 11/21/2016    Closed jaw fracture ([address] 01/15/2020    Community acquired pneumonia of left lower lobe of lung     Compression fracture of L1 lumbar vertebra ([address] 01/15/2020    COPD (chronic obstructive pulmonary disease) ([address] COVID     Fracture of tibial plateau, closed, left, initial encounter 12/05/2017    HCAP (healthcare-associated pneumonia)     Minimally displaced zone I fracture of sacrum ([address] 09/02/2020    MRSA (methicillin resistant staph aureus) culture positive 07/2021    MRSA (methicillin resistant Staphylococcus aureus) 07/13/2021    wound    Mucus plugging of bronchi     NSTEMI (non-ST elevated myocardial infarction) ([address] 4/23/2021    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 Bone density test last year showed severe osteoporosis. Recently, two broken vertebrae (L1, L2) due to coughing. Diagnosed with tracheomalacia and stated she must cough very hard to clear phlegm. Was coughing due to upper respiratory infections which have been treated. Hx of laminectomy and recent kyphoplasty.    Refractured her jawbone which was previously repaired wi    Post herpetic neuralgia     Pressure ulcer of left heel, stage 3 ([address] 1/12/2022    Proximal humerus fracture 10/01/2019    Rheumatoid arthritis ([address]     Shingles 05/2020    Sleep apnea     Status post incision and drainage 07/2021    Left Leg    Surgical wound dehiscence, initial encounter (at [facility] harvest site) 7/12/2021    Temporal arteritis ([address] 07/10/2013    Tobacco use 10/19/2017    Tracheomalacia     Vitreous hemorrhage, right eye ([address] 02/21/2020     Past Surgical History:   Procedure Laterality Date    ARTERY BIOPSY Right 03/01/2021    at [address]  05/30/2013    left temporal artery biopsy    BACK SURGERY  08/2020    BRONCHOSCOPY      BRONCHOSCOPY N/A 06/12/2019    BRONCHOSCOPY ALVEOLAR LAVAGE performed by Quiana Diaz MD at [address]  05/03/2021    CATARACT REMOVAL      COLONOSCOPY      CORONARY ARTERY BYPASS GRAFT N/A 05/03/2021    CORONARY ARTERY BYPASS X3 WITH LEFT ATRIAL APPENDAGE CLIP, 5 LEVEL BILATERAL INTERCOSTAL NERVE BLOCK, STERNAL PLATING performed by Blayne Mackenzie MD at [address] CATH  2021    IR MIDLINE CATH 2021 [id number] [facility] SPECIAL PROCEDURES    KNEE ARTHROSCOPY      left    KYPHOSIS SURGERY      LAMINECTOMY      LEG SURGERY Left 2021    INCISION AND DEBRIDEMENT LEFT LOWER LEG WOUND WITH POSSIBLE WOUND VAC PLACEMENT performed by Fanny Wells MD at [address] [place] [address]  2020    MEDIASTINOSCOPY N/A 2021    MEDIASTINAL EXPLORATION AND EVACUATION OF HEMATOMA performed by Blayne Mackenzie MD at [place] [address]  2021    sacral wound debridement    PRESSURE ULCER DEBRIDEMENT N/A 2021    SACRAL WOUND DEBRIDEMENT performed by Devora Corrigan MD at [address] [facility]  2013    FESS with balloon    SPINAL FUSION      TRANSESOPHAGEAL ECHOCARDIOGRAM  2021    TUBAL LIGATION      UPPER GASTROINTESTINAL ENDOSCOPY  2014    Dilitation     Family History   Problem Relation Age of Onset    Diabetes Mother     Hypertension Mother     Asthma Other     Heart Disease Brother     Diabetes Brother     Diabetes Sister     Heart Disease Brother     Cancer Neg Hx     Emphysema Neg Hx     Heart Failure Neg Hx      Social History     Socioeconomic History    Marital status:       Spouse name: Not on file    Number of children: Not on file    Years of education: Not on file    Highest education level: Not on file   Occupational History    Not on file   Tobacco Use    Smoking status: Former     Packs/day: 1.00     Years: 20.00     Pack years: 20.00     Types: Cigarettes     Quit date: 1991     Years since quittin.8    Smokeless tobacco: Never   Vaping Use    Vaping Use: Never used   Substance and Sexual Activity    Alcohol use: Not Currently     Alcohol/week: 0.0 standard drinks Comment: rarely    Drug use: No    Sexual activity: Not on file   Other Topics Concern    Not on file   Social History Narrative    Not on file     Social Determinants of Health     Financial Resource Strain: Not on file   Food Insecurity: Not on file   Transportation Needs: Not on file   Physical Activity: Not on file   Stress: Not on file   Social Connections: Not on file   Intimate Partner Violence: Not on file   Housing Stability: Not on file     No current facility-administered medications for this encounter.      Current Outpatient Medications   Medication Sig Dispense Refill    sacubitril-valsartan (ENTRESTO) 24-26 MG per tablet Take half tablet twice a day 90 tablet 3    albuterol sulfate HFA (PROVENTIL;VENTOLIN;PROAIR) 108 (90 Base) MCG/ACT inhaler Inhale 2 puffs into the lungs every 4 hours as needed for Wheezing 1 each 5    DALIRESP 500 MCG tablet TAKE ONE TABLET BY MOUTH EVERY DAY 30 tablet 11    DENOSUMAB SC Inject into the skin every 6 months Prolia      cyclobenzaprine (FLEXERIL) 10 MG tablet TAKE ONE TABLET BY MOUTH TWICE DAILY AS NEEDED      Menthol, Topical Analgesic, 10 % LIQD Apply topically      DULoxetine (CYMBALTA) 60 MG extended release capsule Take 60 mg by mouth 2 times daily      Etanercept (ENBREL SURECLICK) 50 MG/ML SOAJ Inject 1 Adjustable Dose Pre-filled Pen Syringe into the skin once a week      torsemide (DEMADEX) 20 MG tablet Take 2 tablets by mouth daily If weight 131 lbs or less, decrease to 20 mg daily 180 tablet 3    metoprolol succinate (TOPROL XL) 25 MG extended release tablet Take 0.5 tablets by mouth daily 45 tablet 3    spironolactone (ALDACTONE) 25 MG tablet Take 1 tablet by mouth daily 90 tablet 3    clopidogrel (PLAVIX) 75 MG tablet Take 1 tablet by mouth daily 90 tablet 3    azithromycin (ZITHROMAX) 250 MG tablet Take 1 tablet by mouth daily 90 tablet 3    predniSONE (DELTASONE) 1 MG tablet Take 4 mg by mouth daily       ondansetron (ZOFRAN) 4 MG tablet every 8 hours as needed       oxyCODONE-acetaminophen (PERCOCET)  MG per tablet Take 1 tablet by mouth daily. insulin glargine (LANTUS) 100 UNIT/ML injection vial Inject 30 Units into the skin nightly (Patient taking differently: Inject 15 Units into the skin 2 times daily) 1 pen 0    docusate sodium (COLACE) 100 MG capsule Take 100 mg by mouth 2 times daily as needed for Constipation      gabapentin (NEURONTIN) 300 MG capsule Take 300 mg by mouth in the morning and at bedtime. latanoprost (XALATAN) 0.005 % ophthalmic solution Place 1 drop into both eyes nightly      NARCAN 4 MG/0.1ML LIQD nasal spray as needed       morphine (MS CONTIN) 15 MG extended release tablet 15 mg 2 times daily. ipratropium (ATROVENT) 0.06 % nasal spray USE 2 SPRAYS BY NASAL ROUTE 2-4 TIMES DAILY (Patient taking differently: 3 times daily as needed) 1 Bottle 5    vitamin D (CHOLECALCIFEROL) 1000 UNIT TABS tablet Take 5,000 Units by mouth daily       calcium carbonate (OSCAL) 500 MG TABS tablet Take 500 mg by mouth daily      atorvastatin (LIPITOR) 40 MG tablet Take 40 mg by mouth daily      Insulin Syringe-Needle U-100 31G X 5/16\" 0.5 ML MISC USE 5 TIMES DAILY      ACCU-CHEK CEDRICK PLUS strip TEST 4 TIMES DAILY  3    insulin lispro (HUMALOG) 100 UNIT/ML injection vial Inject 0-12 Units into the skin 3 times daily (with meals)      Misc. Devices (ACAPELLA) MISC Take 1 Device by mouth as needed 1 each 0    fluticasone (FLONASE) 50 MCG/ACT nasal spray INHALE 2 SPRAYS IN EACH NOSTRIL DAILY 1 Bottle 5    cetirizine (ZYRTEC) 10 MG tablet Take 10 mg by mouth daily.         omeprazole (PRILOSEC) 40 MG capsule Take 40 mg by mouth daily        Facility-Administered Medications Ordered in Other Encounters   Medication Dose Route Frequency Provider Last Rate Last Admin    lidocaine (LMX) 4 % cream   Topical Once Yulisa Verma MD         Allergies   Allergen Reactions    Atenolol Other (See Comments)     Cough         REVIEW OF SYSTEMS  10 systems reviewed, pertinent positives per HPI otherwise noted to be negative. PHYSICAL EXAM  BP (!) 93/52   Pulse 83   Temp 98.1 °F (36.7 °C) (Oral)   Resp 18   Ht 5' 8\" (1.727 m)   Wt 172 lb (78 kg)   SpO2 98%   BMI 26.15 kg/m²    General: Appears chronically ill. Alert  HEENT: Head atraumatic, Eyes normal inspection, PERRL. Normal ENT inspection, Pharynx normal. No signs of dehydration  NECK: Normal inspection  RESPIRATORY: Diminished breath sounds bilaterally. No chest wall tenderness. No respiratory distress on 3 L nasal cannula  CVS: Heart rate and rhythm regular. No Murmurs  ABDOMEN/GI: Soft, Non-tender, No distention  BACK: Normal inspection  EXTREMITIES: Non-Tender. Full ROM. Normal appearance. No Pedal edema  NEURO: Alert and oriented. Sensation normal. Motor normal  PSYCH: Mood normal. Affect normal.  SKIN: Color normal. No rash. Warm, Dry    LABS  I have reviewed all labs for this visit.    Results for orders placed or performed during the hospital encounter of 02/17/23   COVID-19 & Influenza Combo    Specimen: Nasopharyngeal Swab   Result Value Ref Range    SARS-CoV-2 RNA, RT PCR NOT DETECTED NOT DETECTED    INFLUENZA A NOT DETECTED NOT DETECTED    INFLUENZA B NOT DETECTED NOT DETECTED   Brain Natriuretic Peptide   Result Value Ref Range    Pro-BNP 2,431 (H) 0 - 124 pg/mL   CBC with Auto Differential   Result Value Ref Range    WBC 6.1 4.0 - 11.0 K/uL    RBC 3.31 (L) 4.00 - 5.20 M/uL    Hemoglobin 9.6 (L) 12.0 - 16.0 g/dL    Hematocrit 29.6 (L) 36.0 - 48.0 %    MCV 89.6 80.0 - 100.0 fL    MCH 29.0 26.0 - 34.0 pg    MCHC 32.4 31.0 - 36.0 g/dL    RDW 14.9 12.4 - 15.4 %    Platelets 195 920 - 019 K/uL    MPV 7.5 5.0 - 10.5 fL    Neutrophils % 66.3 %    Lymphocytes % 19.0 %    Monocytes % 7.5 %    Eosinophils % 6.1 %    Basophils % 1.1 %    Neutrophils Absolute 4.0 1.7 - 7.7 K/uL    Lymphocytes Absolute 1.2 1.0 - 5.1 K/uL    Monocytes Absolute 0.5 0.0 - 1.3 K/uL    Eosinophils Absolute 0.4 0.0 - 0.6 K/uL    Basophils Absolute 0.1 0.0 - 0.2 K/uL   Troponin   Result Value Ref Range    Troponin <0.01 <0.01 ng/mL   Comprehensive Metabolic Panel   Result Value Ref Range    Sodium 136 136 - 145 mmol/L    Potassium 3.8 3.5 - 5.1 mmol/L    Chloride 97 (L) 99 - 110 mmol/L    CO2 26 21 - 32 mmol/L    Anion Gap 13 3 - 16    Glucose 124 (H) 70 - 99 mg/dL    BUN 23 (H) 7 - 20 mg/dL    Creatinine 1.3 (H) 0.6 - 1.2 mg/dL    Est, Glom Filt Rate 44 (A) >60    Calcium 8.4 8.3 - 10.6 mg/dL    Total Protein 7.2 6.4 - 8.2 g/dL    Albumin 3.6 3.4 - 5.0 g/dL    Albumin/Globulin Ratio 1.0 (L) 1.1 - 2.2    Total Bilirubin 0.5 0.0 - 1.0 mg/dL    Alkaline Phosphatase 73 40 - 129 U/L    ALT 6 (L) 10 - 40 U/L    AST 11 (L) 15 - 37 U/L   Blood gas, venous   Result Value Ref Range    pH, Serafin 7.402 7.350 - 7.450    pCO2, Serafin 41.3 40.0 - 50.0 mmHg    pO2, Serafin 37.3 25.0 - 40.0 mmHg    HCO3, Venous 25.1 23.0 - 29.0 mmol/L    Base Excess, Serafin 0.3 -3.0 - 3.0 mmol/L    O2 Sat, Serafin 71 Not Established %    Carboxyhemoglobin 5.3 (H) 0.0 - 1.5 %    MetHgb, Serafin 0.3 <1.5 %    TC02 (Calc), Serafin 26 Not Established mmol/L    O2 Content, Serafin 10 Not Established VOL %    O2 Therapy Unknown    Urinalysis with Microscopic   Result Value Ref Range    Color, UA Yellow Straw/Yellow    Clarity, UA Clear Clear    Glucose, Ur Negative Negative mg/dL    Bilirubin Urine Negative Negative    Ketones, Urine Negative Negative mg/dL    Specific Gravity, UA 1.010 1.005 - 1.030    Blood, Urine TRACE-INTACT (A) Negative    pH, UA 6.0 5.0 - 8.0    Protein, UA Negative Negative mg/dL    Urobilinogen, Urine 0.2 <2.0 E.U./dL    Nitrite, Urine Negative Negative    Leukocyte Esterase, Urine Negative Negative    Microscopic Examination YES     Urine Type NotGiven     Hyaline Casts, UA 3-5 (A) 0 - 2 /LPF    WBC, UA 0-2 0 - 5 /HPF    RBC, UA 3-4 0 - 4 /HPF    Epithelial Cells, UA 2-5 0 - 5 /HPF    Bacteria, UA Rare (A) None Seen /HPF   POCT Glucose   Result Value Ref Range    POC Glucose 67 (L) 70 - 99 mg/dl    Performed on ACCU-CHEK    POCT Glucose   Result Value Ref Range    POC Glucose 336 (H) 70 - 99 mg/dl    Performed on ACCU-CHEK    EKG 12 Lead   Result Value Ref Range    Ventricular Rate 75 BPM    Atrial Rate 75 BPM    P-R Interval 168 ms    QRS Duration 104 ms    Q-T Interval 398 ms    QTc Calculation (Bazett) 444 ms    P Axis 111 degrees    R Axis 2 degrees    T Axis 58 degrees    Diagnosis       Normal sinus rhythmNon-specific intra-ventricular conduction delayNonspecific ST abnormalityWhen compared with ECG of 08-AUG-2022 19:12,Premature atrial complexes are no longer PresentConfirmed by MAURO LORENZO MD ([id number]) on 2/17/2023 5:01:06 PM       ECG  The Ekg interpreted by me shows  Sinus rhythm with a rate of 75 bpm.  Normal axis. No acute injury pattern. , , QTc 444. RADIOLOGY  CT CHEST PULMONARY EMBOLISM W CONTRAST   Final Result   1. No evidence of pulmonary embolism or acute pulmonary abnormality. 2.  Multiple bilateral lung cysts which are larger but still thin walled. 3.  Small fixed hiatus hernia      4. Mild opacity in the lingula and left lower lobe         XR CHEST PORTABLE   Final Result   1. Linear opacities at the lung bases favored to represent atelectasis or   scarring. 2. Borderline cardiomegaly without evidence of CHF. 3. COPD. ED COURSE/MDM  Patient seen and evaluated. Old records reviewed. Labs and imaging reviewed and results discussed with patient. DIFFERENTIAL DX  Pneumonia, CHF exacerbation, PE, COPD exacerbation    On presentation patient is hypoxic on room air and improved with 3 L nasal cannula. Chest x-ray does show linear opacities. Treated with DuoNebs with minimal improvement. When I was evaluating the patient just while talking she became hypoxic and ultimately required 15 L by high flow nasal cannula. CTPA obtained and shows no PE, but does show opacity of the left lower lobe and lingula.   Treated with ceftriaxone and azithromycin. We are able to wean down her oxygen to 8 L high flow nasal cannula satting in the mid 90s. Patient will be admitted due to her pneumonia and hypoxic respiratory failure. Discussed with Dr. Lizabeth Underwood, who agreed to admit the patient to his service. Critical care    Critical care time 36 minutes exclusive from separate billable procedures that were performed. The following was considered in the determination of critical care but not limited to the level of medical decision making, intensive cardiac and/or respiratory monitoring, frequent vital sign monitoring, evaluation of laboratory studies, evaluation of radiographic studies, oxygen monitoring, and constant monitoring and speaking to family at bedside. Is this patient to be included in the SEP-1 Core Measure due to severe sepsis or septic shock? No   Exclusion criteria - the patient is NOT to be included for SEP-1 Core Measure due to:  2+ SIRS criteria are not met    During the patient's ED course, the patient was given:  Medications   ipratropium-albuterol (DUONEB) nebulizer solution 3 ampule (3 ampules Inhalation Given 2/17/23 1655)   methylPREDNISolone sodium (SOLU-MEDROL) injection 125 mg (125 mg IntraVENous Given 2/17/23 1656)   cefTRIAXone (ROCEPHIN) 1,000 mg in sodium chloride 0.9 % 50 mL IVPB (mini-bag) (0 mg IntraVENous Stopped 2/17/23 1743)     And   azithromycin (ZITHROMAX) 500 mg in 250 mL addavial (0 mg IntraVENous Stopped 2/17/23 1928)   iopamidol (ISOVUE-370) 76 % injection 85 mL (85 mLs IntraVENous Given 2/17/23 1925)   insulin lispro (HUMALOG) injection vial 10 Units (10 Units SubCUTAneous Given 2/17/23 2205)        Missy STEEL DO,  am the primary clinician of record. CLINICAL IMPRESSION  1. Pneumonia of left lower lobe due to infectious organism    2. Acute respiratory failure with hypoxia (HCC)        Blood pressure (!) 94/46, pulse 79, temperature 98.1 °F (36.7 °C), temperature source Oral, resp. rate 14, height 5' 8\" (1.727 m), weight 172 lb (78 kg), SpO2 91 %. DISPOSITION  Bibiana Dunn was admitted in stable condition. Patient was given scripts for the following medications. I counseled patient how to take these medications. New Prescriptions    No medications on file       Follow-up with:  No follow-up provider specified. DISCLAIMER: This chart was created using Dragon dictation software. Efforts were made by me to ensure accuracy, however some errors may be present due to limitations of this technology and occasionally words are not transcribed correctly.       Saravanan Hightower DO  02/17/23 3702

## 2023-02-17 NOTE — ED NOTES
Ambulated patient to bathroom on RA patients O2 was 89% on RA. Titrated patient down to 10 liters.       Erlin Silver RN  02/17/23 0031

## 2023-02-17 NOTE — ED NOTES
Patients daughter came out to inform this RN that patients Marilu Tucker reader is showing a blood sugar of 57. Informed Dr Bailee Miguel, he asked for me to check it with our machine and orange juice was given. BS was 67 for us.       Lynne Hart, ABRAHAM  02/17/23 9995

## 2023-02-18 PROBLEM — R73.9 HYPERGLYCEMIA: Status: ACTIVE | Noted: 2023-02-18

## 2023-02-18 PROBLEM — N17.9 ACUTE KIDNEY INJURY (HCC): Status: ACTIVE | Noted: 2023-02-18

## 2023-02-18 PROBLEM — J40 TRACHEOBRONCHITIS: Status: ACTIVE | Noted: 2023-02-18

## 2023-02-18 LAB
A/G RATIO: 0.9 (ref 1.1–2.2)
ALBUMIN SERPL-MCNC: 3.5 G/DL (ref 3.4–5)
ALP BLD-CCNC: 79 U/L (ref 40–129)
ALT SERPL-CCNC: 10 U/L (ref 10–40)
ANION GAP SERPL CALCULATED.3IONS-SCNC: 12 MMOL/L (ref 3–16)
AST SERPL-CCNC: 14 U/L (ref 15–37)
BASOPHILS ABSOLUTE: 0 K/UL (ref 0–0.2)
BASOPHILS RELATIVE PERCENT: 0.1 %
BILIRUB SERPL-MCNC: 0.4 MG/DL (ref 0–1)
BUN BLDV-MCNC: 22 MG/DL (ref 7–20)
CALCIUM SERPL-MCNC: 8.5 MG/DL (ref 8.3–10.6)
CHLORIDE BLD-SCNC: 96 MMOL/L (ref 99–110)
CO2: 26 MMOL/L (ref 21–32)
CREAT SERPL-MCNC: 1.4 MG/DL (ref 0.6–1.2)
EOSINOPHILS ABSOLUTE: 0 K/UL (ref 0–0.6)
EOSINOPHILS RELATIVE PERCENT: 0 %
GFR SERPL CREATININE-BSD FRML MDRD: 40 ML/MIN/{1.73_M2}
GLUCOSE BLD-MCNC: 250 MG/DL (ref 70–99)
GLUCOSE BLD-MCNC: 267 MG/DL (ref 70–99)
GLUCOSE BLD-MCNC: 309 MG/DL (ref 70–99)
GLUCOSE BLD-MCNC: 333 MG/DL (ref 70–99)
GLUCOSE BLD-MCNC: 401 MG/DL (ref 70–99)
GLUCOSE BLD-MCNC: 438 MG/DL (ref 70–99)
GLUCOSE BLD-MCNC: 479 MG/DL (ref 70–99)
HCT VFR BLD CALC: 31.1 % (ref 36–48)
HEMOGLOBIN: 10.4 G/DL (ref 12–16)
LYMPHOCYTES ABSOLUTE: 0.3 K/UL (ref 1–5.1)
LYMPHOCYTES RELATIVE PERCENT: 6.5 %
MAGNESIUM: 2 MG/DL (ref 1.8–2.4)
MCH RBC QN AUTO: 29.5 PG (ref 26–34)
MCHC RBC AUTO-ENTMCNC: 33.4 G/DL (ref 31–36)
MCV RBC AUTO: 88.3 FL (ref 80–100)
MONOCYTES ABSOLUTE: 0.1 K/UL (ref 0–1.3)
MONOCYTES RELATIVE PERCENT: 1.9 %
NEUTROPHILS ABSOLUTE: 4.3 K/UL (ref 1.7–7.7)
NEUTROPHILS RELATIVE PERCENT: 91.5 %
PDW BLD-RTO: 14.6 % (ref 12.4–15.4)
PERFORMED ON: ABNORMAL
PLATELET # BLD: 233 K/UL (ref 135–450)
PMV BLD AUTO: 7.7 FL (ref 5–10.5)
POTASSIUM REFLEX MAGNESIUM: 4.3 MMOL/L (ref 3.5–5.1)
PROCALCITONIN: 0.09 NG/ML (ref 0–0.15)
RBC # BLD: 3.52 M/UL (ref 4–5.2)
SODIUM BLD-SCNC: 134 MMOL/L (ref 136–145)
TOTAL PROTEIN: 7.4 G/DL (ref 6.4–8.2)
WBC # BLD: 4.7 K/UL (ref 4–11)

## 2023-02-18 PROCEDURE — 6370000000 HC RX 637 (ALT 250 FOR IP): Performed by: INTERNAL MEDICINE

## 2023-02-18 PROCEDURE — 94761 N-INVAS EAR/PLS OXIMETRY MLT: CPT

## 2023-02-18 PROCEDURE — 85025 COMPLETE CBC W/AUTO DIFF WBC: CPT

## 2023-02-18 PROCEDURE — 83036 HEMOGLOBIN GLYCOSYLATED A1C: CPT

## 2023-02-18 PROCEDURE — 2580000003 HC RX 258: Performed by: INTERNAL MEDICINE

## 2023-02-18 PROCEDURE — 6360000002 HC RX W HCPCS: Performed by: INTERNAL MEDICINE

## 2023-02-18 PROCEDURE — 83735 ASSAY OF MAGNESIUM: CPT

## 2023-02-18 PROCEDURE — 99223 1ST HOSP IP/OBS HIGH 75: CPT | Performed by: INTERNAL MEDICINE

## 2023-02-18 PROCEDURE — 2500000003 HC RX 250 WO HCPCS: Performed by: INTERNAL MEDICINE

## 2023-02-18 PROCEDURE — 80053 COMPREHEN METABOLIC PANEL: CPT

## 2023-02-18 PROCEDURE — 84145 PROCALCITONIN (PCT): CPT

## 2023-02-18 PROCEDURE — 94640 AIRWAY INHALATION TREATMENT: CPT

## 2023-02-18 PROCEDURE — 99233 SBSQ HOSP IP/OBS HIGH 50: CPT | Performed by: INTERNAL MEDICINE

## 2023-02-18 PROCEDURE — 2700000000 HC OXYGEN THERAPY PER DAY

## 2023-02-18 PROCEDURE — 36415 COLL VENOUS BLD VENIPUNCTURE: CPT

## 2023-02-18 PROCEDURE — 2060000000 HC ICU INTERMEDIATE R&B

## 2023-02-18 RX ORDER — DEXTROSE MONOHYDRATE 100 MG/ML
INJECTION, SOLUTION INTRAVENOUS CONTINUOUS PRN
Status: DISCONTINUED | OUTPATIENT
Start: 2023-02-18 | End: 2023-02-21 | Stop reason: HOSPADM

## 2023-02-18 RX ORDER — CETIRIZINE HYDROCHLORIDE 10 MG/1
10 TABLET ORAL DAILY
Status: DISCONTINUED | OUTPATIENT
Start: 2023-02-18 | End: 2023-02-21 | Stop reason: HOSPADM

## 2023-02-18 RX ORDER — INSULIN LISPRO 100 [IU]/ML
0-4 INJECTION, SOLUTION INTRAVENOUS; SUBCUTANEOUS NIGHTLY
Status: DISCONTINUED | OUTPATIENT
Start: 2023-02-18 | End: 2023-02-18

## 2023-02-18 RX ORDER — ACETAMINOPHEN 325 MG/1
650 TABLET ORAL EVERY 6 HOURS PRN
Status: DISCONTINUED | OUTPATIENT
Start: 2023-02-18 | End: 2023-02-21 | Stop reason: HOSPADM

## 2023-02-18 RX ORDER — METHYLPREDNISOLONE SODIUM SUCCINATE 40 MG/ML
40 INJECTION, POWDER, LYOPHILIZED, FOR SOLUTION INTRAMUSCULAR; INTRAVENOUS EVERY 12 HOURS
Status: COMPLETED | OUTPATIENT
Start: 2023-02-18 | End: 2023-02-19

## 2023-02-18 RX ORDER — INSULIN LISPRO 100 [IU]/ML
0-4 INJECTION, SOLUTION INTRAVENOUS; SUBCUTANEOUS EVERY 4 HOURS
Status: DISCONTINUED | OUTPATIENT
Start: 2023-02-18 | End: 2023-02-18

## 2023-02-18 RX ORDER — INSULIN LISPRO 100 [IU]/ML
0-9 INJECTION, SOLUTION INTRAVENOUS; SUBCUTANEOUS NIGHTLY
Status: DISCONTINUED | OUTPATIENT
Start: 2023-02-18 | End: 2023-02-18

## 2023-02-18 RX ORDER — DOCUSATE SODIUM 100 MG/1
100 CAPSULE, LIQUID FILLED ORAL 2 TIMES DAILY PRN
Status: DISCONTINUED | OUTPATIENT
Start: 2023-02-18 | End: 2023-02-21 | Stop reason: HOSPADM

## 2023-02-18 RX ORDER — POLYETHYLENE GLYCOL 3350 17 G/17G
17 POWDER, FOR SOLUTION ORAL DAILY PRN
Status: DISCONTINUED | OUTPATIENT
Start: 2023-02-18 | End: 2023-02-21 | Stop reason: HOSPADM

## 2023-02-18 RX ORDER — PANTOPRAZOLE SODIUM 40 MG/1
40 TABLET, DELAYED RELEASE ORAL
Status: DISCONTINUED | OUTPATIENT
Start: 2023-02-18 | End: 2023-02-21 | Stop reason: HOSPADM

## 2023-02-18 RX ORDER — ENOXAPARIN SODIUM 100 MG/ML
40 INJECTION SUBCUTANEOUS DAILY
Status: DISCONTINUED | OUTPATIENT
Start: 2023-02-18 | End: 2023-02-21 | Stop reason: HOSPADM

## 2023-02-18 RX ORDER — INSULIN GLARGINE 100 [IU]/ML
15 INJECTION, SOLUTION SUBCUTANEOUS 2 TIMES DAILY
Status: DISCONTINUED | OUTPATIENT
Start: 2023-02-18 | End: 2023-02-20

## 2023-02-18 RX ORDER — ONDANSETRON 4 MG/1
4 TABLET, ORALLY DISINTEGRATING ORAL EVERY 8 HOURS PRN
Status: DISCONTINUED | OUTPATIENT
Start: 2023-02-18 | End: 2023-02-21 | Stop reason: HOSPADM

## 2023-02-18 RX ORDER — ROFLUMILAST 500 UG/1
500 TABLET ORAL DAILY
Status: DISCONTINUED | OUTPATIENT
Start: 2023-02-18 | End: 2023-02-21 | Stop reason: HOSPADM

## 2023-02-18 RX ORDER — LATANOPROST 50 UG/ML
1 SOLUTION/ DROPS OPHTHALMIC NIGHTLY
Status: DISCONTINUED | OUTPATIENT
Start: 2023-02-18 | End: 2023-02-21 | Stop reason: HOSPADM

## 2023-02-18 RX ORDER — INSULIN LISPRO 100 [IU]/ML
0-16 INJECTION, SOLUTION INTRAVENOUS; SUBCUTANEOUS
Status: DISCONTINUED | OUTPATIENT
Start: 2023-02-19 | End: 2023-02-21 | Stop reason: HOSPADM

## 2023-02-18 RX ORDER — SODIUM CHLORIDE 0.9 % (FLUSH) 0.9 %
10 SYRINGE (ML) INJECTION PRN
Status: DISCONTINUED | OUTPATIENT
Start: 2023-02-18 | End: 2023-02-21 | Stop reason: HOSPADM

## 2023-02-18 RX ORDER — INSULIN LISPRO 100 [IU]/ML
0-9 INJECTION, SOLUTION INTRAVENOUS; SUBCUTANEOUS NIGHTLY
Status: DISCONTINUED | OUTPATIENT
Start: 2023-02-19 | End: 2023-02-21 | Stop reason: HOSPADM

## 2023-02-18 RX ORDER — INSULIN LISPRO 100 [IU]/ML
8 INJECTION, SOLUTION INTRAVENOUS; SUBCUTANEOUS ONCE
Status: COMPLETED | OUTPATIENT
Start: 2023-02-18 | End: 2023-02-19

## 2023-02-18 RX ORDER — LEVOFLOXACIN 5 MG/ML
250 INJECTION, SOLUTION INTRAVENOUS EVERY 24 HOURS
Status: DISCONTINUED | OUTPATIENT
Start: 2023-02-18 | End: 2023-02-20

## 2023-02-18 RX ORDER — SODIUM CHLORIDE 9 MG/ML
INJECTION, SOLUTION INTRAVENOUS PRN
Status: DISCONTINUED | OUTPATIENT
Start: 2023-02-18 | End: 2023-02-21 | Stop reason: HOSPADM

## 2023-02-18 RX ORDER — ONDANSETRON 2 MG/ML
4 INJECTION INTRAMUSCULAR; INTRAVENOUS EVERY 6 HOURS PRN
Status: DISCONTINUED | OUTPATIENT
Start: 2023-02-18 | End: 2023-02-21 | Stop reason: HOSPADM

## 2023-02-18 RX ORDER — PREDNISONE 20 MG/1
40 TABLET ORAL DAILY
Status: DISCONTINUED | OUTPATIENT
Start: 2023-02-20 | End: 2023-02-21 | Stop reason: HOSPADM

## 2023-02-18 RX ORDER — INSULIN LISPRO 100 [IU]/ML
0-16 INJECTION, SOLUTION INTRAVENOUS; SUBCUTANEOUS
Status: DISCONTINUED | OUTPATIENT
Start: 2023-02-18 | End: 2023-02-18

## 2023-02-18 RX ORDER — MORPHINE SULFATE 15 MG/1
15 TABLET, FILM COATED, EXTENDED RELEASE ORAL 2 TIMES DAILY
Status: DISCONTINUED | OUTPATIENT
Start: 2023-02-18 | End: 2023-02-21 | Stop reason: HOSPADM

## 2023-02-18 RX ORDER — IPRATROPIUM BROMIDE AND ALBUTEROL SULFATE 2.5; .5 MG/3ML; MG/3ML
1 SOLUTION RESPIRATORY (INHALATION) 4 TIMES DAILY
Status: DISCONTINUED | OUTPATIENT
Start: 2023-02-18 | End: 2023-02-21 | Stop reason: HOSPADM

## 2023-02-18 RX ORDER — DULOXETIN HYDROCHLORIDE 60 MG/1
60 CAPSULE, DELAYED RELEASE ORAL 2 TIMES DAILY
Status: DISCONTINUED | OUTPATIENT
Start: 2023-02-18 | End: 2023-02-21 | Stop reason: HOSPADM

## 2023-02-18 RX ORDER — ACETAMINOPHEN 650 MG/1
650 SUPPOSITORY RECTAL EVERY 6 HOURS PRN
Status: DISCONTINUED | OUTPATIENT
Start: 2023-02-18 | End: 2023-02-21 | Stop reason: HOSPADM

## 2023-02-18 RX ORDER — SODIUM CHLORIDE 9 MG/ML
INJECTION, SOLUTION INTRAVENOUS CONTINUOUS
Status: ACTIVE | OUTPATIENT
Start: 2023-02-18 | End: 2023-02-18

## 2023-02-18 RX ORDER — GABAPENTIN 300 MG/1
300 CAPSULE ORAL 2 TIMES DAILY
Status: DISCONTINUED | OUTPATIENT
Start: 2023-02-18 | End: 2023-02-21 | Stop reason: HOSPADM

## 2023-02-18 RX ORDER — CLOPIDOGREL BISULFATE 75 MG/1
75 TABLET ORAL DAILY
Status: DISCONTINUED | OUTPATIENT
Start: 2023-02-18 | End: 2023-02-21 | Stop reason: HOSPADM

## 2023-02-18 RX ORDER — IPRATROPIUM BROMIDE AND ALBUTEROL SULFATE 2.5; .5 MG/3ML; MG/3ML
1 SOLUTION RESPIRATORY (INHALATION)
Status: DISCONTINUED | OUTPATIENT
Start: 2023-02-18 | End: 2023-02-18

## 2023-02-18 RX ORDER — SODIUM CHLORIDE 0.9 % (FLUSH) 0.9 %
10 SYRINGE (ML) INJECTION EVERY 12 HOURS SCHEDULED
Status: DISCONTINUED | OUTPATIENT
Start: 2023-02-18 | End: 2023-02-21 | Stop reason: HOSPADM

## 2023-02-18 RX ORDER — ALBUTEROL SULFATE 2.5 MG/3ML
2.5 SOLUTION RESPIRATORY (INHALATION) EVERY 4 HOURS PRN
Status: DISCONTINUED | OUTPATIENT
Start: 2023-02-18 | End: 2023-02-21 | Stop reason: HOSPADM

## 2023-02-18 RX ORDER — CALCIUM CARBONATE 200(500)MG
500 TABLET,CHEWABLE ORAL DAILY
Status: DISCONTINUED | OUTPATIENT
Start: 2023-02-18 | End: 2023-02-21 | Stop reason: HOSPADM

## 2023-02-18 RX ADMIN — METHYLPREDNISOLONE SODIUM SUCCINATE 40 MG: 40 INJECTION, POWDER, FOR SOLUTION INTRAMUSCULAR; INTRAVENOUS at 09:59

## 2023-02-18 RX ADMIN — MORPHINE SULFATE 15 MG: 15 TABLET, FILM COATED, EXTENDED RELEASE ORAL at 09:59

## 2023-02-18 RX ADMIN — LEVOFLOXACIN 250 MG: 5 INJECTION, SOLUTION INTRAVENOUS at 12:45

## 2023-02-18 RX ADMIN — INSULIN LISPRO 12 UNITS: 100 INJECTION, SOLUTION INTRAVENOUS; SUBCUTANEOUS at 16:13

## 2023-02-18 RX ADMIN — CLOPIDOGREL BISULFATE 75 MG: 75 TABLET ORAL at 09:59

## 2023-02-18 RX ADMIN — METHYLPREDNISOLONE SODIUM SUCCINATE 40 MG: 40 INJECTION, POWDER, FOR SOLUTION INTRAMUSCULAR; INTRAVENOUS at 21:11

## 2023-02-18 RX ADMIN — SODIUM CHLORIDE: 9 INJECTION, SOLUTION INTRAVENOUS at 02:34

## 2023-02-18 RX ADMIN — SODIUM CHLORIDE: 9 INJECTION, SOLUTION INTRAVENOUS at 12:42

## 2023-02-18 RX ADMIN — IPRATROPIUM BROMIDE AND ALBUTEROL SULFATE 1 AMPULE: .5; 2.5 SOLUTION RESPIRATORY (INHALATION) at 16:31

## 2023-02-18 RX ADMIN — INSULIN LISPRO 4 UNITS: 100 INJECTION, SOLUTION INTRAVENOUS; SUBCUTANEOUS at 11:34

## 2023-02-18 RX ADMIN — INSULIN LISPRO 3 UNITS: 100 INJECTION, SOLUTION INTRAVENOUS; SUBCUTANEOUS at 10:08

## 2023-02-18 RX ADMIN — ENOXAPARIN SODIUM 40 MG: 100 INJECTION SUBCUTANEOUS at 09:59

## 2023-02-18 RX ADMIN — Medication 10 ML: at 21:14

## 2023-02-18 RX ADMIN — ACETAMINOPHEN 650 MG: 325 TABLET ORAL at 07:54

## 2023-02-18 RX ADMIN — ROFLUMILAST 500 MCG: 500 TABLET ORAL at 10:15

## 2023-02-18 RX ADMIN — INSULIN LISPRO 2 UNITS: 100 INJECTION, SOLUTION INTRAVENOUS; SUBCUTANEOUS at 04:24

## 2023-02-18 RX ADMIN — PANTOPRAZOLE SODIUM 40 MG: 40 TABLET, DELAYED RELEASE ORAL at 06:41

## 2023-02-18 RX ADMIN — DULOXETINE HYDROCHLORIDE 60 MG: 60 CAPSULE, DELAYED RELEASE ORAL at 21:11

## 2023-02-18 RX ADMIN — GABAPENTIN 300 MG: 300 CAPSULE ORAL at 09:58

## 2023-02-18 RX ADMIN — MORPHINE SULFATE 15 MG: 15 TABLET, FILM COATED, EXTENDED RELEASE ORAL at 21:11

## 2023-02-18 RX ADMIN — SACUBITRIL AND VALSARTAN 1 TABLET: 24; 26 TABLET, FILM COATED ORAL at 09:59

## 2023-02-18 RX ADMIN — CALCIUM CARBONATE 500 MG: 500 TABLET, CHEWABLE ORAL at 09:58

## 2023-02-18 RX ADMIN — CETIRIZINE HYDROCHLORIDE 10 MG: 10 TABLET ORAL at 09:59

## 2023-02-18 RX ADMIN — IPRATROPIUM BROMIDE AND ALBUTEROL SULFATE 1 AMPULE: .5; 2.5 SOLUTION RESPIRATORY (INHALATION) at 07:56

## 2023-02-18 RX ADMIN — SACUBITRIL AND VALSARTAN 1 TABLET: 24; 26 TABLET, FILM COATED ORAL at 21:11

## 2023-02-18 RX ADMIN — INSULIN LISPRO 4 UNITS: 100 INJECTION, SOLUTION INTRAVENOUS; SUBCUTANEOUS at 21:27

## 2023-02-18 RX ADMIN — GABAPENTIN 300 MG: 300 CAPSULE ORAL at 21:11

## 2023-02-18 RX ADMIN — Medication 10 ML: at 10:05

## 2023-02-18 RX ADMIN — IPRATROPIUM BROMIDE AND ALBUTEROL SULFATE 1 AMPULE: .5; 2.5 SOLUTION RESPIRATORY (INHALATION) at 19:01

## 2023-02-18 RX ADMIN — DOXYCYCLINE 100 MG: 100 INJECTION, POWDER, LYOPHILIZED, FOR SOLUTION INTRAVENOUS at 02:53

## 2023-02-18 RX ADMIN — DULOXETINE HYDROCHLORIDE 60 MG: 60 CAPSULE, DELAYED RELEASE ORAL at 09:59

## 2023-02-18 ASSESSMENT — LIFESTYLE VARIABLES
HOW OFTEN DO YOU HAVE A DRINK CONTAINING ALCOHOL: NEVER
HOW MANY STANDARD DRINKS CONTAINING ALCOHOL DO YOU HAVE ON A TYPICAL DAY: PATIENT DOES NOT DRINK

## 2023-02-18 ASSESSMENT — PAIN DESCRIPTION - LOCATION: LOCATION: BACK

## 2023-02-18 ASSESSMENT — PAIN SCALES - GENERAL
PAINLEVEL_OUTOF10: 9
PAINLEVEL_OUTOF10: 8

## 2023-02-18 NOTE — PROGRESS NOTES
Bedside report and Pt care transferred to [facility]. Pt denies any assistance at this time.

## 2023-02-18 NOTE — PROGRESS NOTES
2232 Assumed care of pt  please see fulll assessment in flow sheets.   Pt to ICu form ER 6 A&Ox4, no C/O SOB- O2 high flow 7 liters NC  2315 Dr Sofi Perez here to see pt   0000 pt sleeping  resp even and unlabored  0410 covered fo BS of 250 pt repositioned  no change

## 2023-02-18 NOTE — PROGRESS NOTES
Patient transferred from ICU. Patient stable on 5L. Patient weighed on Standing scale. Call light within reach.

## 2023-02-18 NOTE — PROGRESS NOTES
02/18/23 0309   RT Protocol   History Pulmonary Disease 2   Respiratory pattern 0   Breath sounds 2   Cough 0   Indications for Bronchodilator Therapy Decreased or absent breath sounds   Bronchodilator Assessment Score 4   RT Inhaler-Nebulizer Bronchodilator Protocol Note    There is a bronchodilator order in the chart from a provider indicating to follow the RT Bronchodilator Protocol and there is an Initiate RT Inhaler-Nebulizer Bronchodilator Protocol order as well (see protocol at bottom of note). CXR Findings:  XR CHEST PORTABLE    Result Date: 2/17/2023  1. Linear opacities at the lung bases favored to represent atelectasis or scarring. 2. Borderline cardiomegaly without evidence of CHF. 3. COPD. The findings from the last RT Protocol Assessment were as follows:   History Pulmonary Disease: Chronic pulmonary disease  Respiratory Pattern: Regular pattern and RR 12-20 bpm  Breath Sounds: Slightly diminished and/or crackles  Cough: Strong, spontaneous, non-productive  Indication for Bronchodilator Therapy: Decreased or absent breath sounds  Bronchodilator Assessment Score: 4    Aerosolized bronchodilator medication orders have been revised according to the RT Inhaler-Nebulizer Bronchodilator Protocol below. Respiratory Therapist to perform RT Therapy Protocol Assessment initially then follow the protocol. Repeat RT Therapy Protocol Assessment PRN for score 0-3 or on second treatment, BID, and PRN for scores above 3. No Indications - adjust the frequency to every 6 hours PRN wheezing or bronchospasm, if no treatments needed after 48 hours then discontinue using Per Protocol order mode. If indication present, adjust the RT bronchodilator orders based on the Bronchodilator Assessment Score as indicated below.   Use Inhaler orders unless patient has one or more of the following: on home nebulizer, not able to hold breath for 10 seconds, is not alert and oriented, cannot activate and use MDI correctly, or respiratory rate 25 breaths per minute or more, then use the equivalent nebulizer order(s) with same Frequency and PRN reasons based on the score. If a patient is on this medication at home then do not decrease Frequency below that used at home. 0-3 - enter or revise RT bronchodilator order(s) to equivalent RT Bronchodilator order with Frequency of every 4 hours PRN for wheezing or increased work of breathing using Per Protocol order mode. 4-6 - enter or revise RT Bronchodilator order(s) to two equivalent RT bronchodilator orders with one order with BID Frequency and one order with Frequency of every 4 hours PRN wheezing or increased work of breathing using Per Protocol order mode. 7-10 - enter or revise RT Bronchodilator order(s) to two equivalent RT bronchodilator orders with one order with TID Frequency and one order with Frequency of every 4 hours PRN wheezing or increased work of breathing using Per Protocol order mode. 11-13 - enter or revise RT Bronchodilator order(s) to one equivalent RT bronchodilator order with QID Frequency and an Albuterol order with Frequency of every 4 hours PRN wheezing or increased work of breathing using Per Protocol order mode. Greater than 13 - enter or revise RT Bronchodilator order(s) to one equivalent RT bronchodilator order with every 4 hours Frequency and an Albuterol order with Frequency of every 2 hours PRN wheezing or increased work of breathing using Per Protocol order mode.        Electronically signed by Sarah Maciel RCP on 2/18/2023 at 3:09 AM

## 2023-02-18 NOTE — PROGRESS NOTES
RT Inhaler-Nebulizer Bronchodilator Protocol Note    There is a bronchodilator order in the chart from a provider indicating to follow the RT Bronchodilator Protocol and there is an Initiate RT Inhaler-Nebulizer Bronchodilator Protocol order as well (see protocol at bottom of note). CXR Findings:  XR CHEST PORTABLE    Result Date: 2/17/2023  1. Linear opacities at the lung bases favored to represent atelectasis or scarring. 2. Borderline cardiomegaly without evidence of CHF. 3. COPD. The findings from the last RT Protocol Assessment were as follows:   History Pulmonary Disease: (P) Chronic pulmonary disease  Respiratory Pattern: (P) Regular pattern and RR 12-20 bpm  Breath Sounds: (P) Slightly diminished and/or crackles  Cough: (P) Strong, spontaneous, non-productive  Indication for Bronchodilator Therapy: (P) Decreased or absent breath sounds, On home bronchodilators  Bronchodilator Assessment Score: (P) 4    Aerosolized bronchodilator medication orders have been revised according to the RT Inhaler-Nebulizer Bronchodilator Protocol below. Respiratory Therapist to perform RT Therapy Protocol Assessment initially then follow the protocol. Repeat RT Therapy Protocol Assessment PRN for score 0-3 or on second treatment, BID, and PRN for scores above 3. No Indications - adjust the frequency to every 6 hours PRN wheezing or bronchospasm, if no treatments needed after 48 hours then discontinue using Per Protocol order mode. If indication present, adjust the RT bronchodilator orders based on the Bronchodilator Assessment Score as indicated below. Use Inhaler orders unless patient has one or more of the following: on home nebulizer, not able to hold breath for 10 seconds, is not alert and oriented, cannot activate and use MDI correctly, or respiratory rate 25 breaths per minute or more, then use the equivalent nebulizer order(s) with same Frequency and PRN reasons based on the score.   If a patient is on this medication at home then do not decrease Frequency below that used at home. 0-3 - enter or revise RT bronchodilator order(s) to equivalent RT Bronchodilator order with Frequency of every 4 hours PRN for wheezing or increased work of breathing using Per Protocol order mode. 4-6 - enter or revise RT Bronchodilator order(s) to two equivalent RT bronchodilator orders with one order with BID Frequency and one order with Frequency of every 4 hours PRN wheezing or increased work of breathing using Per Protocol order mode. 7-10 - enter or revise RT Bronchodilator order(s) to two equivalent RT bronchodilator orders with one order with TID Frequency and one order with Frequency of every 4 hours PRN wheezing or increased work of breathing using Per Protocol order mode. 11-13 - enter or revise RT Bronchodilator order(s) to one equivalent RT bronchodilator order with QID Frequency and an Albuterol order with Frequency of every 4 hours PRN wheezing or increased work of breathing using Per Protocol order mode. Greater than 13 - enter or revise RT Bronchodilator order(s) to one equivalent RT bronchodilator order with every 4 hours Frequency and an Albuterol order with Frequency of every 2 hours PRN wheezing or increased work of breathing using Per Protocol order mode.          Electronically signed by Karine Mera RCP on 2/18/2023 at 7:59 AM

## 2023-02-18 NOTE — CONSULTS
Patient is being seen at the request of Jony El MD  for a consultation for respiratory failure COPD    HISTORY OF PRESENT ILLNESS:   71years old with history of COPD presented with shortness of breath for 1 week. Severe. Worse with exertion and better with resting. Associated with cough, chest congestion, yellow/green sputum production, and hypoxemia, desaturation to the 70s. No home O2. Uses Albuterol PRN. Quit smoking December 2022 - 1/2 ppd for 30 years.      PAST MEDICAL HISTORY:  Past Medical History:   Diagnosis Date    Acute on chronic diastolic heart failure due to coronary artery disease (HCC)     Acute respiratory failure with hypoxia ([facility])     Atherosclerosis of native artery of right lower extremity with rest pain ([facility]) 07/25/2017    Back pain     Branch retinal vein occlusion 07/20/2012    Bronchiectasis with acute exacerbation ([facility])     Cellulitis and abscess of left leg 7/11/2021    Cellulitis of left lower extremity 7/11/2021    S/P vein harvest 05/2021 for CABG    CHF (congestive heart failure) ([facility])     Closed compression fracture of thoracic vertebra ([address] 01/15/2020    Closed fracture of facial bone with routine healing 11/21/2016    Closed jaw fracture ([address] 01/15/2020    Community acquired pneumonia of left lower lobe of lung     Compression fracture of L1 lumbar vertebra ([address] 01/15/2020    COPD (chronic obstructive pulmonary disease) ([address]     COVID     Fracture of tibial plateau, closed, left, initial encounter 12/05/2017    HCAP (healthcare-associated pneumonia)     Minimally displaced zone I fracture of sacrum ([address] 09/02/2020    MRSA (methicillin resistant staph aureus) culture positive 07/2021    MRSA (methicillin resistant Staphylococcus aureus) 07/13/2021    wound    Mucus plugging of bronchi     NSTEMI (non-ST elevated myocardial infarction) ([address] 4/23/2021    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 Bone density test last year showed severe osteoporosis. Recently, two broken vertebrae (L1, L2) due to coughing. Diagnosed with tracheomalacia and stated she must cough very hard to clear phlegm. Was coughing due to upper respiratory infections which have been treated. Hx of laminectomy and recent kyphoplasty.    Refractured her jawbone which was previously repaired wi    Post herpetic neuralgia     Pressure ulcer of left heel, stage 3 ([address] 1/12/2022    Proximal humerus fracture 10/01/2019    Rheumatoid arthritis ([address]     Shingles 05/2020    Sleep apnea     Status post incision and drainage 07/2021    Left Leg    Surgical wound dehiscence, initial encounter (at [facility] harvest site) 7/12/2021    Temporal arteritis ([address] 07/10/2013    Tobacco use 10/19/2017    Tracheomalacia     Vitreous hemorrhage, right eye ([address] 02/21/2020     PAST SURGICAL HISTORY:  Past Surgical History:   Procedure Laterality Date    ARTERY BIOPSY Right 03/01/2021    at [address]  05/30/2013    left temporal artery biopsy    BACK SURGERY  08/2020    BRONCHOSCOPY      BRONCHOSCOPY N/A 06/12/2019    BRONCHOSCOPY ALVEOLAR LAVAGE performed by Pb Aguillon MD at [address]  05/03/2021    CATARACT REMOVAL      COLONOSCOPY      CORONARY ARTERY BYPASS GRAFT N/A 05/03/2021    CORONARY ARTERY BYPASS X3 WITH LEFT ATRIAL APPENDAGE CLIP, 5 LEVEL BILATERAL INTERCOSTAL NERVE BLOCK, STERNAL PLATING performed by Sylvia Brenner MD at [address] CATH  7/14/2021    IR MIDLINE CATH 7/14/2021 C/Stan Somers Final ARTHROSCOPY      left    KYPHOSIS SURGERY      LAMINECTOMY      LEG SURGERY Left 7/13/2021    INCISION AND DEBRIDEMENT LEFT LOWER LEG WOUND WITH POSSIBLE WOUND VAC PLACEMENT performed by Jabier Stone MD at [address] MANDIBLE FRACTURE SURGERY  02/2020    MEDIASTINOSCOPY N/A 05/05/2021    MEDIASTINAL EXPLORATION AND EVACUATION OF HEMATOMA performed by Abigail Cruz MD at [place] [address]  01/08/2021    sacral wound debridement    PRESSURE ULCER DEBRIDEMENT N/A 01/08/2021    SACRAL WOUND DEBRIDEMENT performed by Kamar Kemp MD at [facility]  05/07/2013    FESS with balloon    SPINAL FUSION      TRANSESOPHAGEAL ECHOCARDIOGRAM  11/02/2021    TUBAL LIGATION      UPPER GASTROINTESTINAL ENDOSCOPY  04/08/2014    Dilitation       FAMILY HISTORY:  family history includes Asthma in an other family member; Diabetes in her brother, mother, and sister; Heart Disease in her brother and brother; Hypertension in her mother. SOCIAL HISTORY:   reports that she quit smoking about 31 years ago. Her smoking use included cigarettes. She has a 20.00 pack-year smoking history.  She has never used smokeless tobacco.    Scheduled Meds:   cefTRIAXone (ROCEPHIN) IV  1,000 mg IntraVENous Q24H    doxycycline (VIBRAMYCIN) IV  100 mg IntraVENous Q12H    clopidogrel  75 mg Oral Daily    calcium carbonate  500 mg Oral Daily    cetirizine  10 mg Oral Daily    Roflumilast  500 mcg Oral Daily    DULoxetine  60 mg Oral BID    gabapentin  300 mg Oral BID    latanoprost  1 drop Both Eyes Nightly    morphine  15 mg Oral BID    pantoprazole  40 mg Oral QAM AC    sodium chloride flush  10 mL IntraVENous 2 times per day    enoxaparin  40 mg SubCUTAneous Daily    ipratropium-albuterol  1 ampule Inhalation Q4H WA    methylPREDNISolone  40 mg IntraVENous Q12H    Followed by    Efrain Davila ON 2/20/2023] predniSONE  40 mg Oral Daily    insulin lispro  0-4 Units SubCUTAneous Q4H     Continuous Infusions:   dextrose      sodium chloride 75 mL/hr at 02/18/23 0621    sodium chloride       PRN Meds:  docusate sodium, glucose, dextrose bolus **OR** dextrose bolus, glucagon (rDNA), dextrose, sodium chloride flush, sodium chloride, ondansetron **OR** ondansetron, polyethylene glycol, acetaminophen **OR** acetaminophen, albuterol    ALLERGIES:  Patient is allergic to atenolol. REVIEW OF SYSTEMS:  Constitutional: Negative for fever  HENT: Negative for sore throat  Eyes: Negative for redness   Respiratory: +  dyspnea, cough  Cardiovascular: Negative for chest pain  Gastrointestinal: Negative for vomiting, diarrhea   Genitourinary: Negative for hematuria   Musculoskeletal: +  arthralgias   Skin: Negative for rash  Neurological: Negative for syncope  Hematological: Negative for adenopathy  Psychiatric/Behavorial: Negative for anxiety    PHYSICAL EXAM:  Blood pressure (!) 119/50, pulse 79, temperature 98.1 °F (36.7 °C), temperature source Oral, resp. rate 19, height 5' 8\" (1.727 m), weight 171 lb 11.8 oz (77.9 kg), SpO2 94 %.' on 5LPM   Gen: No distress. Eyes: PERRL. No sclera icterus. No conjunctival injection. ENT: No discharge. Pharynx clear. Neck: Trachea midline. No obvious mass. Resp: + accessory muscle use. No crackles. Bilateral wheezes. + rhonchi. No dullness on percussion. CV: Regular rate. Regular rhythm. No murmur or rub. No edema. GI: Non-tender. Non-distended. No hernia. Skin: Warm and dry. No nodule on exposed extremities. Lymph: No cervical LAD. No supraclavicular LAD. M/S: No cyanosis. + joint deformity. No clubbing. Neuro: Awake. Alert. Moves all four extremities. Psych: Oriented x 3. No anxiety. LABS:  CBC:   Recent Labs     02/17/23  1541 02/18/23 0424   WBC 6.1 4.7   HGB 9.6* 10.4*   HCT 29.6* 31.1*   MCV 89.6 88.3    233     BMP:   Recent Labs     02/17/23  1541 02/18/23 0424    134*   K 3.8 4.3   CL 97* 96*   CO2 26 26   BUN 23* 22*   CREATININE 1.3* 1.4*     LIVER PROFILE:   Recent Labs     02/17/23  1541 02/18/23 0424   AST 11* 14*   ALT 6* 10   BILITOT 0.5 0.4   ALKPHOS 73 79     PT/INR: No results for input(s): PROTIME, INR in the last 72 hours.   APTT: No results for input(s): APTT in the last 72 hours. UA:  Recent Labs     02/17/23  1721   COLORU Yellow   PHUR 6.0   WBCUA 0-2   RBCUA 3-4   BACTERIA Rare*   CLARITYU Clear   SPECGRAV 1.010   LEUKOCYTESUR Negative   UROBILINOGEN 0.2   BILIRUBINUR Negative   BLOODU TRACE-INTACT*   GLUCOSEU Negative     No results for input(s): PHART, BGW5AET, PO2ART in the last 72 hours. CT chest 2/17 imaging was reviewed by me and showed   1. No evidence of pulmonary embolism or acute pulmonary abnormality. 2.  Multiple bilateral lung cysts which are larger but still thin walled. 3.  Small fixed hiatus hernia   4. Mild opacity in the lingula and left lower lobe         ASSESSMENT:  Acute hypoxemic respiratory failure  COPD with acute exacerbation  Tracheobronchitis   Acute kidney injury  Hyperglycemia  Chronic systolic CHF EF 30 to 28%  Chronic pain on home MS Contin  Rheumatoid arthritis on Enbrel and prednisone 4 mg/day  followed by rheumatology    PLAN:  Supplemental oxygen to maintain SaO2 >92%; wean as tolerated  Intensive inhaled bronchodilator therapy  IV solumedrol 40 mg IV Q12 hrs.  Plan to switch to oral prednisone taper when improved  IV antibiotics to include Levaquin   Acapella and Mucinex  NS 75 cc/hr   Blood sugar control ISS, with goal 150-180  DVT prophylaxis: Lovenox  MRSA prophylaxis: Bactroban  Okay to move out of the ICU from our perspective

## 2023-02-18 NOTE — PLAN OF CARE
Problem: Discharge Planning  Goal: Discharge to home or other facility with appropriate resources  2/18/2023 1857 by Gricel Holder RN  Outcome: Progressing  2/18/2023 0459 by Corazon Silver RN  Outcome: Progressing     Problem: Safety - Adult  Goal: Free from fall injury  2/18/2023 1857 by Gricel Holder RN  Outcome: Progressing  2/18/2023 0459 by Corazon Silver RN  Outcome: Progressing

## 2023-02-18 NOTE — PROGRESS NOTES
Internal Medicine ICU Progress Note      Events of Last 24 hours:    She was admitted for acute respiratory failure. She was seen at [address] [place] [address],[address] yesterday. She was a SOB when she walked to the car. She went home and noted her oxygen saturation was 70%. She called her daughter and came to the ER. She was placed on 15 L of oxygen. She is presently on 5 L. Feels about the same. Has a cough with yellow sputum. No fever. On chronic prednisone/Enbrel for RA and chronic Zithromax. She has tracheomalacia. She is not on oxygen at home. Invasive Lines: PIV    MV:  n/a    No results for input(s): PHART, AAC4LON, PO2ART in the last 72 hours. MV Settings:     / / /     IV:   dextrose      sodium chloride 75 mL/hr at 23 0621    sodium chloride         Vitals:  Temp  Av.3 °F (36.8 °C)  Min: 98.1 °F (36.7 °C)  Max: 99 °F (37.2 °C)  Pulse  Av.5  Min: 71  Max: 95  BP  Min: 89/50  Max: 129/60  SpO2  Av.9 %  Min: 83 %  Max: 100 %  Patient Vitals for the past 4 hrs:   BP Pulse Resp SpO2   23 0756 -- 95 17 91 %   23 0600 (!) 119/50 79 19 94 %       CVP:        Intake/Output Summary (Last 24 hours) at 2023 0904  Last data filed at 2023 8433  Gross per 24 hour   Intake 676.22 ml   Output --   Net 676.22 ml       EXAM:  General: facial swelling. Alert. Eyes: PERRL. No sclera icterus. No conjunctiva injected. ENT: No discharge. Pharynx clear. Neck: Trachea midline. Normal thyroid. Resp: No accessory muscle use. minimal crackles. Mild wheezing. No rhonchi. No dullness on percussion. CV: Regular rate. Regular rhythm. No mumur or rub. Trace edema. No JVD. Palpable pedal pulses. GI: Non-tender. Non-distended. No masses. No organmegaly. Normal bowel sounds. No hernia. Skin: Warm and dry. No nodule on exposed extremities. No rash on exposed extremities. Lymph: No cervical LAD. No supraclavicular LAD. M/S: No cyanosis. No joint deformity. No clubbing. Neuro: Awake. Follows commands. Positive pupils/gag/corneals. Normal pain response. Psych: Oriented to person, place, time. No anxiety or agitation. Medications:  Scheduled Meds:   cefTRIAXone (ROCEPHIN) IV  1,000 mg IntraVENous Q24H    doxycycline (VIBRAMYCIN) IV  100 mg IntraVENous Q12H    clopidogrel  75 mg Oral Daily    calcium carbonate  500 mg Oral Daily    cetirizine  10 mg Oral Daily    Roflumilast  500 mcg Oral Daily    DULoxetine  60 mg Oral BID    gabapentin  300 mg Oral BID    latanoprost  1 drop Both Eyes Nightly    morphine  15 mg Oral BID    pantoprazole  40 mg Oral QAM AC    sodium chloride flush  10 mL IntraVENous 2 times per day    enoxaparin  40 mg SubCUTAneous Daily    ipratropium-albuterol  1 ampule Inhalation Q4H WA    methylPREDNISolone  40 mg IntraVENous Q12H    Followed by    Suellyn Frankel ON 2/20/2023] predniSONE  40 mg Oral Daily    insulin lispro  0-4 Units SubCUTAneous Q4H       PRN Meds:  docusate sodium, glucose, dextrose bolus **OR** dextrose bolus, glucagon (rDNA), dextrose, sodium chloride flush, sodium chloride, ondansetron **OR** ondansetron, polyethylene glycol, acetaminophen **OR** acetaminophen, albuterol    Results:  CBC:   Recent Labs     02/17/23  1541 02/18/23  0424   WBC 6.1 4.7   HGB 9.6* 10.4*   HCT 29.6* 31.1*   MCV 89.6 88.3    233     BMP:   Recent Labs     02/17/23  1541 02/18/23  0424    134*   K 3.8 4.3   CL 97* 96*   CO2 26 26   BUN 23* 22*   CREATININE 1.3* 1.4*     LIVER PROFILE:   Recent Labs     02/17/23  1541 02/18/23  0424   AST 11* 14*   ALT 6* 10   BILITOT 0.5 0.4   ALKPHOS 73 79     PT/INR: No results for input(s): PROTIME, INR in the last 72 hours. APTT: No results for input(s): APTT in the last 72 hours.   UA:  Recent Labs     02/17/23  1721   COLORU Yellow   PHUR 6.0   WBCUA 0-2   RBCUA 3-4   BACTERIA Rare*   CLARITYU Clear   SPECGRAV 1.010   LEUKOCYTESUR Negative   UROBILINOGEN 0.2   BILIRUBINUR Negative   BLOODU TRACE-INTACT*   GLUCOSEU Negative      Latest Reference Range & Units 2/17/23 15:41   Pro-BNP 0 - 124 pg/mL 2,431 (H)   Troponin <0.01 ng/mL <0.01   (H): Data is abnormally high    Cultures:  COVID 19 neg  Flu neg    Films:    CT CHEST PULMONARY EMBOLISM W CONTRAST   Final Result   1. No evidence of pulmonary embolism or acute pulmonary abnormality. 2.  Multiple bilateral lung cysts which are larger but still thin walled. 3.  Small fixed hiatus hernia      4. Mild opacity in the lingula and left lower lobe         XR CHEST PORTABLE   Final Result   1. Linear opacities at the lung bases favored to represent atelectasis or   scarring. 2. Borderline cardiomegaly without evidence of CHF. 3. COPD. Assessment:    Principal Problem:    Acute respiratory failure with hypoxia (HCC)  Active Problems:    Rheumatoid arthritis (HCC)    GERD (gastroesophageal reflux disease)    Cylindrical bronchiectasis (HCC)    Tracheobronchomalacia    Immunocompromised state ([facility]    Coronary artery disease    Essential hypertension    Mixed hyperlipidemia    Type 2 diabetes mellitus with diabetic peripheral angiopathy without gangrene, with long-term current use of insulin (HCC)    Chronic diastolic congestive heart failure (HCC)    COPD exacerbation ([address]  Resolved Problems:    * No resolved hospital problems. *         Plan:    #Acute respiratory failure. Patient presents emergency room with shortness of breath. She was placed on 15 L of oxygen for hypoxia. She has been weaned down to 5 L. She was admitted to the ICU. Etiology of respiratory failure is likely COPD exacerbation. #Acute exacerbation of COPD. Use breathing treatments. Use steroids. She is on chronic azithromycin. She is presently on IV Rocephin and Doxy which we will continue. She takes Daliresp and Singulair at home. She has failed Dulera. #History of tracheomalacia. #Diabetes mellitus type 2 with hyperglycemia. Sugars are increased due to steroids. Use insulin.     #CTPA shows lung cyst.  Appear to be chronic. Outpatient follow-up. No acute PE seen. #CAD status post CABG. Denies any chest pain. On Plavix. #Rheumatoid arthritis. On Enbrel and prednisone at home. Hold Enbrel. #Hyperlipidemia on Lipitor. #Chronic combined systolic and diastolic heart failure. Continue Entresto. #Elevation of creatinine. Admission creatinine 1.4. Baseline 1.1. On IV fluids. Monitor closely. #Lovenox for DVT prophylaxis. #Chronic pain syndrome-chronic opioid dependence uncomplicated. On MS Contin. Transfer to PCU. All questions and concerns were addressed to the patient/family. Alternatives to my treatment were discussed. The note was completed using EMR. Every effort was made to ensure accuracy; however, inadvertent computerized transcription errors may be present.          Jackie Rizzo MD 9:04 AM 2/18/2023

## 2023-02-18 NOTE — H&P
Hospital Medicine History & Physical      PCP: Merlyn Cabezas MD    Date of Service: Pt seen/examined on 2/17/23 and admitted on 2/17/23 to Inpatient    Chief Complaint   Patient presents with    Shortness of Breath     C/o cough and congestion for the last week. Increased SOB starting today. O2 at triage 79-81% on RA       History Of Present Illness: The patient is a 71 y.o. female with PMH below, presents with SOB/SHIN, cough, chest congestion, hypoxia on home pOx. Pt reports that she began feeling SOB about a week ago. He Sx are exac by exertion. She has had assoc productive cough, and chest congestion. She notes getting very dyspnic while walking into Dr. Tracie Luna office today. She sees him for a sacral ulcer. She checked her pOx today at home and her sats were in the 70's so she came to the ER. In the ER, her sats were 79% in triage. She was initially requiring 3L at arrival.  Her O2 demand widely fluctuated in the ER from 3-15L. She was on 15 L per Dr. Clementine Davidson at time of acceptance. She was subsequently weaned back down to 7L O2 at he time of transport to the ICU. She is not normally on O2. She has a Hx of COPD, combined CHF. Imaging in the ER suggestive of PNA. She does not wear O2 at home. She does not think she is more swollen than usual and she does not think her weight has gone up. In fact she says she feels a little dehydrated.       Past Medical History:        Diagnosis Date    Acute on chronic diastolic heart failure due to coronary artery disease (HCC)     Acute respiratory failure with hypoxia (HCC)     Atherosclerosis of native artery of right lower extremity with rest pain ([address] 07/25/2017    Back pain     Branch retinal vein occlusion 07/20/2012    Bronchiectasis with acute exacerbation ([address]     Cellulitis and abscess of left leg 7/11/2021    Cellulitis of left lower extremity 7/11/2021    S/P vein harvest 05/2021 for CABG    CHF (congestive heart failure) ([address]     Closed compression fracture of thoracic vertebra ([address] 01/15/2020    Closed fracture of facial bone with routine healing 11/21/2016    Closed jaw fracture ([address] 01/15/2020    Community acquired pneumonia of left lower lobe of lung     Compression fracture of L1 lumbar vertebra ([address] 01/15/2020    COPD (chronic obstructive pulmonary disease) ([address]     COVID     Fracture of tibial plateau, closed, left, initial encounter 12/05/2017    HCAP (healthcare-associated pneumonia)     Minimally displaced zone I fracture of sacrum ([address] 09/02/2020    MRSA (methicillin resistant staph aureus) culture positive 07/2021    MRSA (methicillin resistant Staphylococcus aureus) 07/13/2021    wound    Mucus plugging of bronchi     NSTEMI (non-ST elevated myocardial infarction) ([address] 4/23/2021    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 Bone density test last year showed severe osteoporosis. Recently, two broken vertebrae (L1, L2) due to coughing. Diagnosed with tracheomalacia and stated she must cough very hard to clear phlegm. Was coughing due to upper respiratory infections which have been treated. Hx of laminectomy and recent kyphoplasty.    Refractured her jawbone which was previously repaired wi    Post herpetic neuralgia     Pressure ulcer of left heel, stage 3 ([address] 1/12/2022    Proximal humerus fracture 10/01/2019    Rheumatoid arthritis ([address]     Shingles 05/2020    Sleep apnea     Status post incision and drainage 07/2021    Left Leg    Surgical wound dehiscence, initial encounter (at [facility] harvest site) 7/12/2021    Temporal arteritis ([address] 07/10/2013    Tobacco use 10/19/2017    Tracheomalacia     Vitreous hemorrhage, right eye ([address] 02/21/2020       Past Surgical History:        Procedure Laterality Date    ARTERY BIOPSY Right 03/01/2021    at [facility] 05/30/2013    left temporal artery biopsy    BACK SURGERY  08/2020    BRONCHOSCOPY      BRONCHOSCOPY N/A 06/12/2019    BRONCHOSCOPY ALVEOLAR LAVAGE performed by Zandra Sandhoff, MD at [address]  05/03/2021    CATARACT REMOVAL      COLONOSCOPY      CORONARY ARTERY BYPASS GRAFT N/A 05/03/2021    CORONARY ARTERY BYPASS X3 WITH LEFT ATRIAL APPENDAGE CLIP, 5 LEVEL BILATERAL INTERCOSTAL NERVE BLOCK, STERNAL PLATING performed by Royce Kingston MD at [address] CATH  7/14/2021    IR MIDLINE CATH 7/14/2021 [facility] SPECIAL PROCEDURES    KNEE ARTHROSCOPY      left    KYPHOSIS SURGERY      LAMINECTOMY      LEG SURGERY Left 7/13/2021    INCISION AND DEBRIDEMENT LEFT LOWER LEG WOUND WITH POSSIBLE WOUND VAC PLACEMENT performed by Yung Chawdick MD at [facility]  02/2020    MEDIASTINOSCOPY N/A 05/05/2021    MEDIASTINAL EXPLORATION AND EVACUATION OF HEMATOMA performed by Royce Kingston MD at [place] [id number]  01/08/2021    sacral wound debridement    PRESSURE ULCER DEBRIDEMENT N/A 01/08/2021    SACRAL WOUND DEBRIDEMENT performed by Catalino Barbosa MD at [facility]  05/07/2013    FESS with balloon    SPINAL FUSION      TRANSESOPHAGEAL ECHOCARDIOGRAM  11/02/2021    TUBAL LIGATION      UPPER GASTROINTESTINAL ENDOSCOPY  04/08/2014    Dilitation       Medications Prior to Admission:    Prior to Admission medications    Medication Sig Start Date End Date Taking?  Authorizing Provider   sacubitril-valsartan (ENTRESTO) 24-26 MG per tablet Take half tablet twice a day 9/6/22   Adam Rose MD   albuterol sulfate HFA (PROVENTIL;VENTOLIN;PROAIR) 108 (90 Base) MCG/ACT inhaler Inhale 2 puffs into the lungs every 4 hours as needed for Wheezing 8/8/22   SAM Gusman   DALIRESP 500 MCG tablet TAKE ONE TABLET BY MOUTH EVERY DAY 7/18/22   Rell Herrera МАРИЯ Batres PA-C   DENOSUMAB SC Inject into the skin every 6 months Prolia    Historical Provider, MD   cyclobenzaprine (FLEXERIL) 10 MG tablet TAKE ONE TABLET BY MOUTH TWICE DAILY AS NEEDED 4/26/22   Historical Provider, MD   Menthol, Topical Analgesic, 10 % LIQD Apply topically    Historical Provider, MD   DULoxetine (CYMBALTA) 60 MG extended release capsule Take 60 mg by mouth 2 times daily 3/25/22   Historical Provider, MD   Etanercept (ENBREL SURECLICK) 50 MG/ML SOAJ Inject 1 Adjustable Dose Pre-filled Pen Syringe into the skin once a week    Historical Provider, MD   torsemide (DEMADEX) 20 MG tablet Take 2 tablets by mouth daily If weight 131 lbs or less, decrease to 20 mg daily 2/22/22   Ross Helton, MD   metoprolol succinate (TOPROL XL) 25 MG extended release tablet Take 0.5 tablets by mouth daily 2/22/22   Ross Helton, MD   spironolactone (ALDACTONE) 25 MG tablet Take 1 tablet by mouth daily 2/22/22   Ross Helton, MD   clopidogrel (PLAVIX) 75 MG tablet Take 1 tablet by mouth daily 2/22/22   Ross Helton, MD   azithromycin (ZITHROMAX) 250 MG tablet Take 1 tablet by mouth daily 2/7/22   Lina Batres PA-C   predniSONE (DELTASONE) 1 MG tablet Take 4 mg by mouth daily     Historical Provider, MD   ondansetron (ZOFRAN) 4 MG tablet every 8 hours as needed  12/9/21   Historical Provider, MD   oxyCODONE-acetaminophen (PERCOCET)  MG per tablet Take 1 tablet by mouth daily. 1/11/22   Historical Provider, MD   insulin glargine (LANTUS) 100 UNIT/ML injection vial Inject 30 Units into the skin nightly  Patient taking differently: Inject 15 Units into the skin 2 times daily 12/28/21   Jaun Solis MD   docusate sodium (COLACE) 100 MG capsule Take 100 mg by mouth 2 times daily as needed for Constipation    Historical Provider, MD   gabapentin (NEURONTIN) 300 MG capsule Take 300 mg by mouth in the morning and at bedtime.      Historical Provider, MD   latanoprost (XALATAN) 0.005 % ophthalmic solution Place 1 drop into both eyes nightly    Historical Provider, MD   [facility] 4 MG/0.1ML LIQD nasal spray as needed  10/20/20   Historical Provider, MD   morphine (MS CONTIN) 15 MG extended release tablet 15 mg 2 times daily. 10/16/20   Historical Provider, MD   ipratropium (ATROVENT) 0.06 % nasal spray USE 2 SPRAYS BY NASAL ROUTE 2-4 TIMES DAILY  Patient taking differently: 3 times daily as needed 10/29/20   Leeanne Florez MD   vitamin D (CHOLECALCIFEROL) 1000 UNIT TABS tablet Take 5,000 Units by mouth daily     Historical Provider, MD   calcium carbonate (OSCAL) 500 MG TABS tablet Take 500 mg by mouth daily    Historical Provider, MD   atorvastatin (LIPITOR) 40 MG tablet Take 40 mg by mouth daily 10/16/18   Historical Provider, MD   insulin lispro (HUMALOG) 100 UNIT/ML injection vial Inject 0-12 Units into the skin 3 times daily (with meals) 10/23/17   Cordelia Alcantara MD   fluticasone (FLONASE) 50 MCG/ACT nasal spray INHALE 2 SPRAYS IN [facility] NOSTRIL DAILY 11/25/13   Leeanne Florez MD   cetirizine (ZYRTEC) 10 MG tablet Take 10 mg by mouth daily. Historical Provider, MD   omeprazole (PRILOSEC) 40 MG capsule Take 40 mg by mouth daily     Historical Provider, MD       Allergies:  Atenolol    Social History:    TOBACCO:   reports that she quit smoking about 31 years ago. Her smoking use included cigarettes. She has a 20.00 pack-year smoking history. She has never used smokeless tobacco.  ETOH:   reports that she does not currently use alcohol. Family History:  Reviewed in detail and negative for DM, Early CAD, Cancer (except as below).  Positive as follows:        Problem Relation Age of Onset    Diabetes Mother     Hypertension Mother     Asthma Other     Heart Disease Brother     Diabetes Brother     Diabetes Sister     Heart Disease Brother     Cancer Neg Hx     Emphysema Neg Hx     Heart Failure Neg Hx        REVIEW OF SYSTEMS:   Pertinent positives/negatives as follows: SOB/SHIN, cough, chest congestion, hypoxia on home pOx, and as discussed in HPI, otherwise a complete ROS performed and all other systems are negative. PHYSICAL EXAM PERFORMED:  /60   Pulse 84   Temp 98.1 °F (36.7 °C) (Oral)   Resp 16   Ht 5' 8\" (1.727 m)   Wt 172 lb (78 kg)   SpO2 98%   BMI 26.15 kg/m²   GEN:  A&Ox3, NAD. Appears older that stated age. HEENT:  NC/AT,EOMI, dry MM, no erythema/exudates or visible masses. CVS:  Normal S1,S2. RRR. Without M/G/R.   LUNG:   Diminished bilat. No wheezes, rales or rhonchi. ABD:  Soft, ND/NT, BS+ x4. Without G/R.  EXT: 2+ pulses, no c/c. Trace edema BLE. Brisk cap refill. PSY:  Thought process intact, affect appropriate. SARAH:  CN III-XII grossly intact. Moves all 4 spontaneously. Sensory grossly intact. SKIN: Wound to sacral region which does not appear acutely infected. Chart review shows recent radiographs:  XR CHEST PORTABLE    Result Date: 2/17/2023  EXAMINATION: ONE XRAY VIEW OF THE CHEST 2/17/2023 3:16 pm COMPARISON: 03/17/2022 and 06/06/2022. HISTORY: ORDERING SYSTEM PROVIDED HISTORY: SOB TECHNOLOGIST PROVIDED HISTORY: Reason for exam:->SOB Reason for Exam: sob FINDINGS: Lung volumes are large the diaphragm is flattened. There are linear opacities at the lung bases. No confluent airspace consolidation or effusion is noted. Borderline cardiomegaly appears stable. The mediastinal contour pulmonary vessels are unremarkable. 1. Linear opacities at the lung bases favored to represent atelectasis or scarring. 2. Borderline cardiomegaly without evidence of CHF. 3. COPD. CT CHEST PULMONARY EMBOLISM W CONTRAST    Result Date: 2/17/2023  EXAMINATION: CTA OF THE CHEST 2/17/2023 7:25 pm TECHNIQUE: CTA of the chest was performed after the administration of intravenous contrast.  Multiplanar reformatted images are provided for review. MIP images are provided for review.  Automated exposure control, iterative reconstruction, and/or weight based adjustment of the mA/kV was utilized to reduce the radiation dose to as low as reasonably achievable. COMPARISON: 12/12/2022 HISTORY: ORDERING SYSTEM PROVIDED HISTORY: Chest Pain TECHNOLOGIST PROVIDED HISTORY: Reason for exam:->Chest Pain Decision Support Exception - unselect if not a suspected or confirmed emergency medical condition->Emergency Medical Condition (MA) Reason for Exam: chest pain, r/o PE FINDINGS: Pulmonary Arteries: Pulmonary arteries are adequately opacified for evaluation. No evidence of intraluminal filling defect to suggest pulmonary embolism. Main pulmonary artery is normal in caliber. Mediastinum: No evidence of mediastinal lymphadenopathy. The heart is mildly enlarged. There is no acute abnormality of the thoracic aorta. Small fixed hiatus hernia with wall thickening. Atherosclerotic changes of the aorta and the great vessels. Calcified hilar and mediastinal lymph nodes. Patient is post median sternotomy and CABG. Lungs/pleura: Multiple small bullae. Paraseptal emphysema. Patchy opacity in the lingula and the left lower lobe. Posterior ventral hernias which contain mesenteric fat. Multiple lung cyst in the lower lobes bilaterally. These were noted on the previous study. Mild bronchiectasis in the lower lobes. 1 cyst in the left lower lobe is larger than previously seen. It is thin wall. It currently measures 4.5 cm by 3 cm. In 05/20/2019, it measured 3.8 cm by 3.2 cm. The other lung cysts are also larger but are old thin walled. Upper Abdomen: Limited images of the upper abdomen are unremarkable. Soft Tissues/Bones: Kyphoplasties at several levels. Spondylosis. 1.  No evidence of pulmonary embolism or acute pulmonary abnormality. 2.  Multiple bilateral lung cysts which are larger but still thin walled. 3.  Small fixed hiatus hernia 4.   Mild opacity in the lingula and left lower lobe       EKG:    EKG 12 Lead [[id number]]    Collected: 02/17/23 1600    Updated: 02/17/23 1701 Ventricular Rate 75 BPM    Atrial Rate 75 BPM    P-R Interval 168 ms    QRS Duration 104 ms    Q-T Interval 398 ms    QTc Calculation (Bazett) 444 ms    P Axis 111 degrees    R Axis 2 degrees    T Axis 58 degrees    Diagnosis Normal sinus rhythmNon-specific intra-ventricular conduction delayNonspecific ST abnormalityWhen compared with ECG of 08-AUG-2022 19:12,Premature atrial complexes are no longer PresentConfirmed by MAURO Darling MD ([id number]) on 2/17/2023 5:01:06 PM     Echo 2D w doppler w color complete  Order: [id number]  Status: Edited Result - FINAL    Visible to patient: Yes (seen)    Next appt: 03/03/2023 at 10:00 AM in IP Unit (Manisha Lee MD)    Dx: SOB (shortness of breath)    1 Result Note    1 Follow-up Encounter     important suggestion  Newer results are available. Click to view them now. Component 11 mo ago    Left Ventricular Ejection Fraction 33    LVEF MODALITY ECHO            Narrative & Impression    Transthoracic Echocardiography Report (TTE)      Demographics      Patient Name       Brianna ROSENTHAL      Date of Study      03/24/2022         Gender              Female      Patient Number     [id number]         Date of Birth       1950      Visit Number       [id number]          Age                 80 year(s)      Accession Number   [id number]         Room Number         OP      Corporate ID       [id number]           [address] [facility]      Ordering Physician Hiral Menchaca MD                 Physician           MD, [facility]     Procedure     Type of Study      TTE procedure:ECHOCARDIOGRAM COMPLETE 2D W DOPPLER W COLOR. Procedure Date  Date: 03/24/2022 Start: 10:46 AM     Study Location: [address] [place] [address] - Echo Lab  Technical Quality: Adequate visualization     Indications:Dyspnea/SOB.      Patient Status: Routine     Height: 68 inches Weight: 150 pounds BSA: 1.81 m2 BMI: 22.81 kg/m2     BP: 131/73 mmHg      Conclusions      Summary   Left ventricular systolic function is moderately reduced with ejection   fraction estimated at 30-35 %. Moderate global hypokinesis is present. Left ventricular size is severely increased. Heart is spherical.Wall   thickness is normal.   The left atrium is mildly dilated. The ascending aorta is mildly dilated at 3.6cm. Grade II diastolic dysfunction with elevated filling pressure. Moderate mitral regurgitation. Mild aortic regurgitation is present. Moderate tricuspid regurgitation. Normal systolic pulmonary artery pressure (SPAP) estimated at 37 mmHg (RA   pressure 8 mmHg). Mild pulmonic regurgitation present.    9- Limited echo less than 30% moderate MR      Signature      ------------------------------------------------------------------   Electronically signed by Sae Carreno MD, [facility] (Interpreting   physician) on 03/24/2022 at 04:43 PM   ------------------------------------------------------------------           CBC:  Recent Labs     02/17/23  1541   WBC 6.1   HGB 9.6*   HCT 29.6*         RENAL  Recent Labs     02/17/23  1541      K 3.8   CL 97*   CO2 26   BUN 23*   CREATININE 1.3*   GLUCOSE 124*     Hemoglobin a1c:  Lab Results   Component Value Date    LABA1C 7.7 12/20/2021    LABA1C 7.2 08/11/2021    LABA1C 8.1 05/03/2021       LFT'S:  Recent Labs     02/17/23  1541   AST 11*   ALT 6*   BILITOT 0.5   ALKPHOS 73     CARDIAC ENZYMES:   Recent Labs     02/17/23  1541   TROPONINI <0.01     Lab Results   Component Value Date    PROBNP 2,431 (H) 02/17/2023    PROBNP 3,742 (H) 08/08/2022    PROBNP 2,255 (H) 06/06/2022     U/A:  Recent Labs     02/17/23  1721   LEUKOCYTESUR Negative   BACTERIA Rare*   WBCUA 0-2   COLORU Yellow   RBCUA 3-4   CLARITYU Clear   SPECGRAV 1.010   BLOODU TRACE-INTACT*   GLUCOSEU Negative       VBG:  Recent Labs     02/17/23  1540   PHVEN 7.402   KKP2BUE 41.3   BNJ5DIR 25.1   PO2VEN 37.3   [id number] PHYSICIAN CERTIFICATION  I certify that Dre Farrar is expected to be hospitalized for 2 midnights based on the following assessment and plan:    ASSESSMENT/PLAN:  Acute respiratory failure with hypoxia, likely related to PNA and aeCOPD +/- aeCHF. Supplemental O2 to maintain SPO2 ? 92%, continuous pulse ox. Satting 83% on RA in the ER. Currently requiring 15 L O2. Patient normally not on O2 at home. Wean as tolerated. Admit ICU 2/2 O2 requirement. Intensivist c/s.    CAP. IV ceftriaxone and doxy (on azithro chronically at home). Resp Cx. aeCOPD, IV solumedrol, IV ABX as above. PRN/MARIO intensive NEB therapy. Check respiratory culture and procalcitonin. Pulm consult. Hold home regimen for now. AeCHF? Think this is less likely but she does have a Hx of 30-35% EF and Gr 2 DD on last echo - see above. Caution w/ IVF. Addendum: Her jugular veins are nearly completely collapsed on US check at bedside. She appears clinically dry. Will give gentle IVF w/ 12 h time limit. Hypotension. Pt has Hx of soft BP's at baseline w/ SBP's in the 90's. Will still hold BP and diuretic Rx for now. DM2.  EPISODE OF HYPOGLYCEMIA IN THE ED, 67, 337 after intervention. Hold oral Rx, chk A1c, add Low SSI q4h. Chronic pain, cont home MS Contin. Will hold her Percocet 10 mg qd. COVID/FLU neg    DVT Prophylaxis: Lx  Diet: NPO w/ S&C. Can likely advance in the AM if she remains stable ON. Code Status: Full Code   PT/OT Eval Status: Will order if needed and as patient condition allows  Dispo - Admit to inpatient ICU    Total critical care time caring for this patient with life threatening, unstable organ failure, including direct patient contact, management of life support systems, review of data including imaging and labs, discussions with other team members and physicians is 33 minutes so far today, excluding procedures.       Regino Gardiner MD    Thank you Jose Montano MD for the opportunity to be involved in this patient's care. If you have any questions or concerns please feel free to contact me via the [employer] at [phone].    This chart was generated using the Dragon dictation system. I created this record but it may contain dictation errors given the limitations of this technology.

## 2023-02-18 NOTE — PROGRESS NOTES
4 Eyes Skin Assessment     The patient is being assess for   Admission    I agree that 2 RN's have performed a thorough Head to Toe Skin Assessment on the patient. ALL assessment sites listed below have been assessed. Areas assessed for pressure by both nurses:   [x]   Head, Face, and Ears   [x]   Shoulders, Back, and Chest, Abdomen  [x]   Arms, Elbows, and Hands   [x]   Coccyx, Sacrum, and Ischium  [x]   Legs, Feet, and Heels        Skin Assessed Under all Medical Devices by both nurses:  O2 device tubing              All Mepilex Borders were peeled back and area peeked at by both nurses:  Yes  Please list where Mepilex Borders are located:  YES             **SHARE this note so that the co-signing nurse is able to place an eSignature**    Co-signer eSignature: Electronically signed by Ethan Lucas RN on 2/18/23 at 7:36 AM EST    Does the Patient have Skin Breakdown related to pressure?   Yes LDA WOUND CARE was Initiated documentation include the Blanca-wound, Wound Assessment, Measurements, Dressing Treatment, Drainage, and Color\",     (Insert Photo here  )         Sven Prevention initiated:  Yes   Wound Care Orders initiated:  Yes      [address]  nurse consulted for Pressure Injury (Stage 3,4, Unstageable, DTI, NWPT, Complex wounds)and New or Established Ostomies:  Yes      Primary Nurse eSignature: Electronically signed by Nasir Franklin RN on 2/18/23 at 4:50 AM EST

## 2023-02-18 NOTE — FLOWSHEET NOTE
02/18/23 0900   Vitals   Temp 97.7 °F (36.5 °C)   Temp Source Oral   Heart Rate 100   Resp 17   BP (!) 117/54   MAP (Calculated) 75   MAP (mmHg) 67   Cardiac Rhythm Sinus rhythm   Oxygen Therapy   SpO2 93 %   O2 Device High flow nasal cannula   O2 Flow Rate (L/min) 5 L/min   Vital signs stable. Pt is alert and oriented and denies any worsening SOB or pain at the moment. Nothing new noted on head to toe assessment. Lung sounds diminished upon auscultation. Mepilex remains secure in place to sacrum. Pt is NSR  on the monitor. Morning medication administration completed. Normal saline continues at 75 ml/hr. Pt denies any further assistance at the moment. Will continue to monitor.

## 2023-02-19 LAB
ANION GAP SERPL CALCULATED.3IONS-SCNC: 13 MMOL/L (ref 3–16)
BUN BLDV-MCNC: 31 MG/DL (ref 7–20)
CALCIUM SERPL-MCNC: 8.2 MG/DL (ref 8.3–10.6)
CHLORIDE BLD-SCNC: 91 MMOL/L (ref 99–110)
CO2: 23 MMOL/L (ref 21–32)
CREAT SERPL-MCNC: 1.1 MG/DL (ref 0.6–1.2)
ESTIMATED AVERAGE GLUCOSE: 194.4 MG/DL
GFR SERPL CREATININE-BSD FRML MDRD: 54 ML/MIN/{1.73_M2}
GLUCOSE BLD-MCNC: 195 MG/DL (ref 70–99)
GLUCOSE BLD-MCNC: 199 MG/DL (ref 70–99)
GLUCOSE BLD-MCNC: 282 MG/DL (ref 70–99)
GLUCOSE BLD-MCNC: 299 MG/DL (ref 70–99)
GLUCOSE BLD-MCNC: 335 MG/DL (ref 70–99)
GLUCOSE BLD-MCNC: 440 MG/DL (ref 70–99)
HBA1C MFR BLD: 8.4 %
PERFORMED ON: ABNORMAL
POTASSIUM SERPL-SCNC: 4 MMOL/L (ref 3.5–5.1)
SODIUM BLD-SCNC: 127 MMOL/L (ref 136–145)

## 2023-02-19 PROCEDURE — 6370000000 HC RX 637 (ALT 250 FOR IP): Performed by: INTERNAL MEDICINE

## 2023-02-19 PROCEDURE — 99233 SBSQ HOSP IP/OBS HIGH 50: CPT | Performed by: INTERNAL MEDICINE

## 2023-02-19 PROCEDURE — 6360000002 HC RX W HCPCS: Performed by: INTERNAL MEDICINE

## 2023-02-19 PROCEDURE — 36415 COLL VENOUS BLD VENIPUNCTURE: CPT

## 2023-02-19 PROCEDURE — 2060000000 HC ICU INTERMEDIATE R&B

## 2023-02-19 PROCEDURE — 2580000003 HC RX 258: Performed by: INTERNAL MEDICINE

## 2023-02-19 PROCEDURE — 80048 BASIC METABOLIC PNL TOTAL CA: CPT

## 2023-02-19 PROCEDURE — 94640 AIRWAY INHALATION TREATMENT: CPT

## 2023-02-19 PROCEDURE — 2700000000 HC OXYGEN THERAPY PER DAY

## 2023-02-19 PROCEDURE — 94761 N-INVAS EAR/PLS OXIMETRY MLT: CPT

## 2023-02-19 RX ORDER — INSULIN LISPRO 100 [IU]/ML
10 INJECTION, SOLUTION INTRAVENOUS; SUBCUTANEOUS
Status: DISCONTINUED | OUTPATIENT
Start: 2023-02-19 | End: 2023-02-21

## 2023-02-19 RX ADMIN — ENOXAPARIN SODIUM 40 MG: 100 INJECTION SUBCUTANEOUS at 09:42

## 2023-02-19 RX ADMIN — CETIRIZINE HYDROCHLORIDE 10 MG: 10 TABLET ORAL at 09:41

## 2023-02-19 RX ADMIN — ACETAMINOPHEN 650 MG: 325 TABLET ORAL at 15:27

## 2023-02-19 RX ADMIN — INSULIN LISPRO 2 UNITS: 100 INJECTION, SOLUTION INTRAVENOUS; SUBCUTANEOUS at 21:33

## 2023-02-19 RX ADMIN — METHYLPREDNISOLONE SODIUM SUCCINATE 40 MG: 40 INJECTION, POWDER, FOR SOLUTION INTRAMUSCULAR; INTRAVENOUS at 09:42

## 2023-02-19 RX ADMIN — METHYLPREDNISOLONE SODIUM SUCCINATE 40 MG: 40 INJECTION, POWDER, FOR SOLUTION INTRAMUSCULAR; INTRAVENOUS at 21:28

## 2023-02-19 RX ADMIN — INSULIN LISPRO 10 UNITS: 100 INJECTION, SOLUTION INTRAVENOUS; SUBCUTANEOUS at 17:23

## 2023-02-19 RX ADMIN — MORPHINE SULFATE 15 MG: 15 TABLET, FILM COATED, EXTENDED RELEASE ORAL at 21:28

## 2023-02-19 RX ADMIN — PANTOPRAZOLE SODIUM 40 MG: 40 TABLET, DELAYED RELEASE ORAL at 09:41

## 2023-02-19 RX ADMIN — Medication 10 ML: at 21:28

## 2023-02-19 RX ADMIN — CLOPIDOGREL BISULFATE 75 MG: 75 TABLET ORAL at 09:41

## 2023-02-19 RX ADMIN — CALCIUM CARBONATE 500 MG: 500 TABLET, CHEWABLE ORAL at 09:40

## 2023-02-19 RX ADMIN — INSULIN GLARGINE 15 UNITS: 100 INJECTION, SOLUTION SUBCUTANEOUS at 00:13

## 2023-02-19 RX ADMIN — Medication 10 ML: at 09:43

## 2023-02-19 RX ADMIN — IPRATROPIUM BROMIDE AND ALBUTEROL SULFATE 1 AMPULE: 2.5; .5 SOLUTION RESPIRATORY (INHALATION) at 20:02

## 2023-02-19 RX ADMIN — INSULIN GLARGINE 15 UNITS: 100 INJECTION, SOLUTION SUBCUTANEOUS at 21:33

## 2023-02-19 RX ADMIN — INSULIN LISPRO 8 UNITS: 100 INJECTION, SOLUTION INTRAVENOUS; SUBCUTANEOUS at 00:15

## 2023-02-19 RX ADMIN — IPRATROPIUM BROMIDE AND ALBUTEROL SULFATE 1 AMPULE: 2.5; .5 SOLUTION RESPIRATORY (INHALATION) at 11:27

## 2023-02-19 RX ADMIN — MORPHINE SULFATE 15 MG: 15 TABLET, FILM COATED, EXTENDED RELEASE ORAL at 09:41

## 2023-02-19 RX ADMIN — SODIUM CHLORIDE: 9 INJECTION, SOLUTION INTRAVENOUS at 11:54

## 2023-02-19 RX ADMIN — IPRATROPIUM BROMIDE AND ALBUTEROL SULFATE 1 AMPULE: 2.5; .5 SOLUTION RESPIRATORY (INHALATION) at 07:27

## 2023-02-19 RX ADMIN — LEVOFLOXACIN 250 MG: 5 INJECTION, SOLUTION INTRAVENOUS at 12:02

## 2023-02-19 RX ADMIN — LATANOPROST 1 DROP: 50 SOLUTION OPHTHALMIC at 21:28

## 2023-02-19 RX ADMIN — GABAPENTIN 300 MG: 300 CAPSULE ORAL at 21:28

## 2023-02-19 RX ADMIN — IPRATROPIUM BROMIDE AND ALBUTEROL SULFATE 1 AMPULE: 2.5; .5 SOLUTION RESPIRATORY (INHALATION) at 15:09

## 2023-02-19 RX ADMIN — INSULIN LISPRO 16 UNITS: 100 INJECTION, SOLUTION INTRAVENOUS; SUBCUTANEOUS at 12:05

## 2023-02-19 RX ADMIN — INSULIN LISPRO 10 UNITS: 100 INJECTION, SOLUTION INTRAVENOUS; SUBCUTANEOUS at 12:04

## 2023-02-19 RX ADMIN — LATANOPROST 1 DROP: 50 SOLUTION OPHTHALMIC at 00:14

## 2023-02-19 RX ADMIN — DULOXETINE HYDROCHLORIDE 60 MG: 60 CAPSULE, DELAYED RELEASE ORAL at 21:28

## 2023-02-19 RX ADMIN — GABAPENTIN 300 MG: 300 CAPSULE ORAL at 09:41

## 2023-02-19 RX ADMIN — ROFLUMILAST 500 MCG: 500 TABLET ORAL at 09:41

## 2023-02-19 RX ADMIN — DULOXETINE HYDROCHLORIDE 60 MG: 60 CAPSULE, DELAYED RELEASE ORAL at 09:40

## 2023-02-19 RX ADMIN — INSULIN GLARGINE 15 UNITS: 100 INJECTION, SOLUTION SUBCUTANEOUS at 09:53

## 2023-02-19 RX ADMIN — INSULIN LISPRO 8 UNITS: 100 INJECTION, SOLUTION INTRAVENOUS; SUBCUTANEOUS at 09:44

## 2023-02-19 NOTE — PROGRESS NOTES
Patient starting to have productive cough. Oxygen DeSat during coughing episodes. RT called to room for respiratory assessment. Patient turned up to 5L to oxygenate. Turned down to 2L post recovery.

## 2023-02-19 NOTE — PROGRESS NOTES
Internal Medicine Progress Note      Events of Last 24 hours:    She was admitted for acute respiratory failure. She was seen at [facility] yesterday. She was a SOB when she walked to the car. She went home and noted her oxygen saturation was 70%. She called her daughter and came to the ER. She was placed on 15 L of oxygen. She is presently on 5 L. Feels about the same. Has a cough with yellow sputum. No fever. On chronic prednisone/Enbrel for RA and chronic Zithromax. She has tracheomalacia. She is not on oxygen at home. - feeling better. Less dyspneic. No chest pain. No fever. Invasive Lines: PIV    MV:  n/a    No results for input(s): PHART, HQR4IQK, PO2ART in the last 72 hours. MV Settings:     / / /     IV:   dextrose      sodium chloride 5 mL/hr at 23 1242       Vitals:  Temp  Av.7 °F (36.5 °C)  Min: 97.3 °F (36.3 °C)  Max: 98.1 °F (36.7 °C)  Pulse  Av.7  Min: 94  Max: 102  BP  Min: 99/46  Max: 139/58  SpO2  Av.4 %  Min: 93 %  Max: 100 %  Patient Vitals for the past 4 hrs:   BP Temp Temp src Pulse Resp SpO2   23 0742 (!) 99/46 97.6 °F (36.4 °C) Oral 94 18 97 %   23 0729 -- -- -- -- -- 95 %         CVP:        Intake/Output Summary (Last 24 hours) at 2023 0930  Last data filed at 2023 0428  Gross per 24 hour   Intake 591 ml   Output 200 ml   Net 391 ml         EXAM:  General: facial swelling. Alert. Eyes: PERRL. No sclera icterus. No conjunctiva injected. ENT: No discharge. Pharynx clear. Neck: Trachea midline. Normal thyroid. Resp: No accessory muscle use. minimal crackles. Mild wheezing. No rhonchi. No dullness on percussion. CV: Regular rate. Regular rhythm. No mumur or rub. Trace edema. No JVD. Palpable pedal pulses. GI: Non-tender. Non-distended. No masses. No organmegaly. Normal bowel sounds. No hernia. Skin: Warm and dry. No nodule on exposed extremities. No rash on exposed extremities. Lymph: No cervical LAD. No supraclavicular LAD. M/S: No cyanosis. No joint deformity. No clubbing. Neuro: Awake. Follows commands. Positive pupils/gag/corneals. Normal pain response. Psych: Oriented to person, place, time. No anxiety or agitation. Medications:  Scheduled Meds:   clopidogrel  75 mg Oral Daily    calcium carbonate  500 mg Oral Daily    cetirizine  10 mg Oral Daily    Roflumilast  500 mcg Oral Daily    DULoxetine  60 mg Oral BID    gabapentin  300 mg Oral BID    latanoprost  1 drop Both Eyes Nightly    morphine  15 mg Oral BID    pantoprazole  40 mg Oral QAM AC    sodium chloride flush  10 mL IntraVENous 2 times per day    enoxaparin  40 mg SubCUTAneous Daily    methylPREDNISolone  40 mg IntraVENous Q12H    Followed by    Annia Hannah ON 2/20/2023] predniSONE  40 mg Oral Daily    sacubitril-valsartan  1 tablet Oral BID    levofloxacin  250 mg IntraVENous Q24H    insulin lispro  0-16 Units SubCUTAneous TID WC    insulin lispro  0-9 Units SubCUTAneous Nightly    insulin glargine  15 Units SubCUTAneous BID    ipratropium-albuterol  1 ampule Inhalation 4x daily       PRN Meds:  docusate sodium, glucose, dextrose bolus **OR** dextrose bolus, glucagon (rDNA), dextrose, sodium chloride flush, sodium chloride, ondansetron **OR** ondansetron, polyethylene glycol, acetaminophen **OR** acetaminophen, albuterol    Results:  CBC:   Recent Labs     02/17/23  1541 02/18/23  0424   WBC 6.1 4.7   HGB 9.6* 10.4*   HCT 29.6* 31.1*   MCV 89.6 88.3    233       BMP:   Recent Labs     02/17/23  1541 02/18/23  0424 02/19/23  0806    134* 127*   K 3.8 4.3 4.0   CL 97* 96* 91*   CO2 26 26 23   BUN 23* 22* 31*   CREATININE 1.3* 1.4* 1.1       LIVER PROFILE:   Recent Labs     02/17/23  1541 02/18/23  0424   AST 11* 14*   ALT 6* 10   BILITOT 0.5 0.4   ALKPHOS 73 79       PT/INR: No results for input(s): PROTIME, INR in the last 72 hours. APTT: No results for input(s): APTT in the last 72 hours.   UA:  Recent Labs     02/17/23  1721   COLORU Yellow   PHUR 6.0 WBCUA 0-2   RBCUA 3-4   BACTERIA Rare*   CLARITYU Clear   SPECGRAV 1.010   LEUKOCYTESUR Negative   UROBILINOGEN 0.2   BILIRUBINUR Negative   BLOODU TRACE-INTACT*   GLUCOSEU Negative        Latest Reference Range & Units 2/17/23 15:41   Pro-BNP 0 - 124 pg/mL 2,431 (H)   Troponin <0.01 ng/mL <0.01   (H): Data is abnormally high    Cultures:  COVID 19 neg  Flu neg    Films:    CT CHEST PULMONARY EMBOLISM W CONTRAST   Final Result   1. No evidence of pulmonary embolism or acute pulmonary abnormality. 2.  Multiple bilateral lung cysts which are larger but still thin walled. 3.  Small fixed hiatus hernia      4. Mild opacity in the lingula and left lower lobe         XR CHEST PORTABLE   Final Result   1. Linear opacities at the lung bases favored to represent atelectasis or   scarring. 2. Borderline cardiomegaly without evidence of CHF. 3. COPD. Assessment:    Principal Problem:    Acute respiratory failure with hypoxia ([facility])  Active Problems:    Tracheobronchitis    Hyperglycemia    Acute kidney injury ([address]    Rheumatoid arthritis (HCC)    GERD (gastroesophageal reflux disease)    Cylindrical bronchiectasis (HCC)    Tracheobronchomalacia    Immunocompromised state ([address]    Coronary artery disease    Essential hypertension    Mixed hyperlipidemia    Type 2 diabetes mellitus with diabetic peripheral angiopathy without gangrene, with long-term current use of insulin (HCC)    Chronic diastolic congestive heart failure (HCC)    COPD exacerbation ([address]  Resolved Problems:    * No resolved hospital problems. *         Plan:    #Acute respiratory failure. Patient presents emergency room with shortness of breath. She was placed on 15 L of oxygen for hypoxia. She has been weaned down to 4 L. She was admitted to the ICU. Etiology of respiratory failure is likely COPD exacerbation. #Acute exacerbation of COPD. Use breathing treatments. Use steroids. She is on chronic azithromycin.   She is presently on IV Rocephin and Doxy which we will continue.  She takes Daliresp and Singulair at home.  She has failed Dulera.    #Hyponatremia likely related to above. Fluid restriction 1500 cc. Corrected sodium is 130.    #History of tracheomalacia.      #Diabetes mellitus type 2 with hyperglycemia.  Sugars are increased due to steroids.  Use insulin.    #CTPA shows lung cyst.  Appear to be chronic.  Outpatient follow-up.  No acute PE seen.    #CAD status post CABG.  Denies any chest pain.  On Plavix.    #Rheumatoid arthritis.  On Enbrel and prednisone at home.  Hold Enbrel.    #Hyperlipidemia on Lipitor.    #Chronic combined systolic and diastolic heart failure.  Continue Entresto.    #Elevation of creatinine.  Admission creatinine 1.4.  Baseline 1.1.  Stop IVF.    #Lovenox for DVT prophylaxis.    #Chronic pain syndrome-chronic opioid dependence uncomplicated.  On MS Contin.        All questions and concerns were addressed to the patient/family. Alternatives to my treatment were discussed. The note was completed using EMR. Every effort was made to ensure accuracy; however, inadvertent computerized transcription errors may be present.         SABI YUEN MD 9:30 AM 2/19/2023

## 2023-02-19 NOTE — PROGRESS NOTES
One time dose of 8 units Humalog given @ this time per Dr. Fany Garcia for pt's FSBS of 479.  15 units Lantus also administered.

## 2023-02-19 NOTE — PLAN OF CARE
Problem: Discharge Planning  Goal: Discharge to home or other facility with appropriate resources  2/19/2023 0821 by Mame Boswell RN  Outcome: Progressing  2/18/2023 1857 by Alfonso Phillips RN  Outcome: Progressing     Problem: Safety - Adult  Goal: Free from fall injury  2/19/2023 0821 by Mame Boswell RN  Outcome: Progressing  2/18/2023 1857 by Alfonso Phillips RN  Outcome: Progressing     Problem: Pain  Goal: Verbalizes/displays adequate comfort level or baseline comfort level  Outcome: Progressing     Problem: Chronic Conditions and Co-morbidities  Goal: Patient's chronic conditions and co-morbidity symptoms are monitored and maintained or improved  Outcome: Progressing  Note:   HEART FAILURE CARE PLAN:    Comorbidities Reviewed: Yes   Patient has a past medical history of Acute on chronic diastolic heart failure due to coronary artery disease ([address] Acute respiratory failure with hypoxia ([address] Atherosclerosis of native artery of right lower extremity with rest pain ([address] Back pain, Branch retinal vein occlusion, Bronchiectasis with acute exacerbation ([facility]), Cellulitis and abscess of left leg, Cellulitis of left lower extremity, CHF (congestive heart failure) ([facility]), Closed compression fracture of thoracic vertebra ([address] Closed fracture of facial bone with routine healing, Closed jaw fracture ([address] Community acquired pneumonia of left lower lobe of lung, Compression fracture of L1 lumbar vertebra ([facility]), COPD (chronic obstructive pulmonary disease) ([address] COVID, Fracture of tibial plateau, closed, left, initial encounter, HCAP (healthcare-associated pneumonia), Minimally displaced zone I fracture of sacrum ([facility]), MRSA (methicillin resistant staph aureus) culture positive, MRSA (methicillin resistant Staphylococcus aureus), Mucus plugging of bronchi, NSTEMI (non-ST elevated myocardial infarction) ([address] Osteomyelitis of mandible, Osteoporosis with pathological fracture, Post herpetic neuralgia, Pressure ulcer of left heel, stage 3 ([address] Proximal humerus fracture, Rheumatoid arthritis ([address] Shingles, Sleep apnea, Status post incision and drainage, Surgical wound dehiscence, initial encounter (at [facility] harvest site), Temporal arteritis ([facility] Tobacco use, Tracheomalacia, and Vitreous hemorrhage, right eye ([address] ECHOCARDIOGRAM Reviewed: Yes   Patient's Ejection Fraction (EF) is greater than 40%    Weights Reviewed: Yes   Admission weight: 172 lb (78 kg)   Wt Readings from Last 3 Encounters:   02/19/23 172 lb 7 oz (78.2 kg)   02/17/23 171 lb 3.2 oz (77.7 kg)   02/03/23 175 lb 12.8 oz (79.7 kg)     Intake & Output Reviewed: Yes     Intake/Output Summary (Last 24 hours) at 2/19/2023 0820  Last data filed at 2/19/2023 8393  Gross per 24 hour   Intake 591 ml   Output 200 ml   Net 391 ml     Medications Reviewed: Yes     SCHEDULED HOSPITAL MEDICATIONS:   clopidogrel  75 mg Oral Daily    calcium carbonate  500 mg Oral Daily    cetirizine  10 mg Oral Daily    Roflumilast  500 mcg Oral Daily    DULoxetine  60 mg Oral BID    gabapentin  300 mg Oral BID    latanoprost  1 drop Both Eyes Nightly    morphine  15 mg Oral BID    pantoprazole  40 mg Oral QAM AC    sodium chloride flush  10 mL IntraVENous 2 times per day    enoxaparin  40 mg SubCUTAneous Daily    methylPREDNISolone  40 mg IntraVENous Q12H    Followed by    Kelin Murphy ON 2/20/2023] predniSONE  40 mg Oral Daily    sacubitril-valsartan  1 tablet Oral BID    levofloxacin  250 mg IntraVENous Q24H    insulin lispro  0-16 Units SubCUTAneous TID WC    insulin lispro  0-9 Units SubCUTAneous Nightly    insulin glargine  15 Units SubCUTAneous BID    ipratropium-albuterol  1 ampule Inhalation 4x daily       ACE/ARB/ARNI is REQUIRED for EF </= 60% SYSTOLIC FAILURE:   ACE[de-identified] None  ARB[de-identified] None  ARNI[de-identified] Sacubitril/Valsartan-Entresto    Evidenced-Based Beta Blocker is REQUIRED for EF </= 43% SYSTOLIC FAILURE:   [de-identified]None    Diuretics:  [de-identified]None    Diet Reviewed: Yes   ADULT DIET;  Regular; 4 carb choices (60 gm/meal)    Goal of Care Reviewed: Yes   Patient and/or Family's stated Goal of Care this Admission: reduce shortness of breath, increase activity tolerance, and be more comfortable prior to discharge.

## 2023-02-19 NOTE — PROGRESS NOTES
Physician Progress Note      PATIENTColettkarrie Diop  [facility] #:                  [id number]  :                       1950  ADMIT DATE:       2023 3:38 PM  [address] [place] DATE:  RESPONDING  PROVIDER #:        Atilio Mays MD          QUERY TEXT:    Patient admitted with SOB. Documentation reflects PNA in H&P . If   possible, please document in the progress notes and discharge summary if PNA   was: The medical record reflects the following:  Risk Factors: Age, COPD, bronchiectasis, DM  Clinical Indicators:  H&P -  \" Imaging in the ER suggestive of PNA. ..... CAP. IV ceftriaxone and doxy (on   azithro chronically at home). Resp Cx. \"  PN -  \"#Acute exacerbation of COPD. Use breathing treatments. Use steroids. She   is on chronic azithromycin. She is presently on IV Rocephin and Doxy which we   will continue. \"  Treatment: Rocephin, doxy, serial labs and supportive care    Thank you,  En Mclaughlin, RN, BSN, CRCR  Options provided:  -- PNA confirmed after study  -- PNA ruled out after study  -- Other - I will add my own diagnosis  -- Disagree - Not applicable / Not valid  -- Disagree - Clinically unable to determine / Unknown  -- Refer to Clinical Documentation Reviewer    PROVIDER RESPONSE TEXT:    PNA ruled out after study.     Query created by: Hunter Alcantar on 2023 10:15 AM      Electronically signed by:  Atilio Mays MD 2023 9:30 AM

## 2023-02-19 NOTE — CARE COORDINATION
Case Management Assessment  Initial Evaluation    Date/Time of Evaluation: 2/19/2023 2:43 PM  Assessment Completed by: Donn Elliott RN    If patient is discharged prior to next notation, then this note serves as note for discharge by case management. Patient Name: Otilio Dinero                   YOB: 1950  Diagnosis: Acute respiratory failure with hypoxia ([address] [J96.01]  Pneumonia of left lower lobe due to infectious organism [J18.9]                   Date / Time: 2/17/2023  3:38 PM    Patient Admission Status: Inpatient   Readmission Risk (Low < 19, Mod (19-27), High > 27): Readmission Risk Score: 14.3    Current PCP: Issa Rizvi MD  PCP verified by ? Yes [facility])    Chart Reviewed: Yes      History Provided by: Patient  Patient Orientation: Alert and Oriented    Patient Cognition: Alert    Hospitalization in the last 30 days (Readmission):  No    If yes, Readmission Assessment in CM Navigator will be completed. Advance Directives:      Code Status: Full Code   Patient's Primary Decision Maker is:      Primary Decision MakeMatias Lee Child - [phone]    Secondary Decision Maker: Cole Bartlett  Child - [phone]    Discharge Planning:    Patient lives with: Alone Type of Home: House  Primary Care Giver: Self  Patient Support Systems include: Children, Family Members   Current Financial resources: Other (Comment) ([facility] Medicare)  Current community resources:    Current services prior to admission: None            Current DME:              Type of Home Care services:  None    ADLS  Prior functional level:    Current functional level: Independent in ADLs/IADLs    PT AM-PAC:   /24  OT AM-PAC:   /24    Family can provide assistance at DC: Yes  Would you like Case Management to discuss the discharge plan with any other family members/significant others, and if so, who?  Yes Heriberto Gomez and Edith Rushing)  Plans to Return to Present Housing: Yes  Other Identified Issues/Barriers to RETURNING to current housing: None  Potential Assistance needed at discharge: N/A            Potential DME:    Patient expects to discharge to: [facility] for transportation at discharge: Family    Financial    Payor: Gurmeet Kang / Plan: Irasema Navarro ESSENTIAL/PLUS / Product Type: *No Product type* /     Does insurance require precert for SNF: Yes    Potential assistance Purchasing Medications: Yes  Meds-to-Beds request:        [address] [facility], [address] [phone]  [address] [facility]  Phone: [phone] Fax: [phone]    [address]  [place] [phone] Natividad Jiang [phone]  [facility]  [place] [address]  Phone: [phone] Fax: [phone]      Notes:    Factors facilitating achievement of predicted outcomes: Family support, Motivated, Cooperative, and Pleasant    Barriers to discharge: None    Additional Case Management Notes: Reviewed chart and met with pt. Role of CM explained. States lives home alone and plan return. [facility] can have 24/7 assist if needed at AL. [facility] is active with [facility] for SN only. Denies home O2 prior to admit. Will follow for poss O2 at AL and JONES with [facility]. The Plan for Transition of Care is related to the following treatment goals of Acute respiratory failure with hypoxia ([address] [J96.01]  Pneumonia of left lower lobe due to infectious organism [W68.6]    IF APPLICABLE: The Patient and/or patient representative Arpan Yeh and her family were provided with a choice of provider and agrees with the discharge plan. Freedom of choice list with basic dialogue that supports the patient's individualized plan of care/goals and shares the quality data associated with the providers was provided to:     Patient Representative Name:       The Patient and/or Patient Representative Agree with the Discharge Plan?       [employer], RN  Case Management Department  Ph: [phone] Fax: [phone]

## 2023-02-19 NOTE — ACP (ADVANCE CARE PLANNING)
Advance Care Planning     General Advance Care Planning (ACP) Conversation    Date of Conversation: 2/17/2023  Conducted with: Patient with Decision Making Capacity    Healthcare Decision Maker:    Primary Decision Maker: Sandra Pollack Child - [phone]    Secondary Decision Maker: Danni Conde - Child - [phone]  Click here to complete Healthcare Decision Makers including selection of the Healthcare Decision Maker Relationship (ie \"Primary\"). Today we documented Decision Maker(s) consistent with ACP documents on file. Content/Action Overview:   Has ACP document(s) on file - reflects the patient's care preferences  Reviewed DNR/DNI and patient elects Full Code (Attempt Resuscitation)        Length of Voluntary ACP Conversation in minutes:  <16 minutes (Non-Billable)    Willem Calvin RN

## 2023-02-19 NOTE — PROGRESS NOTES
Nutrition Assessment     Type and Reason for Visit: Initial, Positive Nutrition Screen (Wound)    Nutrition Recommendations/Plan:   Continue Regular Diet        Nutrition Assessment:  Pt. nutritionally compromised AEB admitted with ARF. At risk for further nutrition compromise r/t healing stage 4 pressure area to her sacrum that she has been seeing Dr. Arturo Solis and reports that she has been offloading and has been eating very well . Will continue regular diet. Estimated Daily Nutrient Needs:  Energy (kcal):  9800-3669 based ~ 20-23 kcal/kg cbw Weight Used for Energy Requirements: Current     Protein (g):  95 based ~ 1.5 gr/kg cbw Weight Used for Protein Requirements: Ideal        Fluid (ml/day):    7475-7457    Nutrition Related Findings:     Wound Type: Pressure Injury, Stage IV    Current Nutrition Therapies:    ADULT DIET; Regular    Anthropometric Measures:  Height: 5' 8\" (172.7 cm)  Current Body Wt: 171 lb (77.6 kg)   BMI: 26    Nutrition Diagnosis:   Increased nutrient needs related to increase demand for energy/nutrients as evidenced by wounds    Nutrition Interventions:   Food and/or Nutrient Delivery: Continue Current Diet  Nutrition Education/Counseling: No recommendation at this time  Coordination of Nutrition Care: Continue to monitor while inpatient       Goals:     Goals: PO intake 75% or greater, by next RD assessment       Nutrition Monitoring and Evaluation:   Behavioral-Environmental Outcomes: None Identified  Food/Nutrient Intake Outcomes: Food and Nutrient Intake  Physical Signs/Symptoms Outcomes: Weight, Skin, Biochemical Data    Discharge Planning:     Too soon to determine     Emmettanderson Coco, [address], [place]  Contact: [phone]

## 2023-02-19 NOTE — PLAN OF CARE
Problem: Discharge Planning  Goal: Discharge to home or other facility with appropriate resources  2/19/2023 1547 by Robin Yost RN  Outcome: Progressing  2/19/2023 0821 by Daisy Vega RN  Outcome: Progressing     Problem: Safety - Adult  Goal: Free from fall injury  2/19/2023 1547 by Robin Yost RN  Outcome: Progressing  2/19/2023 0821 by Daisy Vega RN  Outcome: Progressing     Problem: Pain  Goal: Verbalizes/displays adequate comfort level or baseline comfort level  2/19/2023 1547 by Robin Yost RN  Outcome: Progressing  2/19/2023 0821 by Daisy Vega RN  Outcome: Progressing     Problem: Chronic Conditions and Co-morbidities  Goal: Patient's chronic conditions and co-morbidity symptoms are monitored and maintained or improved  2/19/2023 1547 by Robin Yost RN  Outcome: Progressing  2/19/2023 0821 by Diasy Vega RN  Outcome: Progressing  Note:   HEART FAILURE CARE PLAN:    Comorbidities Reviewed: Yes   Patient has a past medical history of Acute on chronic diastolic heart failure due to coronary artery disease ([address] Acute respiratory failure with hypoxia ([address] Atherosclerosis of native artery of right lower extremity with rest pain ([address] Back pain, Branch retinal vein occlusion, Bronchiectasis with acute exacerbation (HCC), Cellulitis and abscess of left leg, Cellulitis of left lower extremity, CHF (congestive heart failure) (HCC), Closed compression fracture of thoracic vertebra ([address] Closed fracture of facial bone with routine healing, Closed jaw fracture ([address] Community acquired pneumonia of left lower lobe of lung, Compression fracture of L1 lumbar vertebra ([address] COPD (chronic obstructive pulmonary disease) ([address] COVID, Fracture of tibial plateau, closed, left, initial encounter, HCAP (healthcare-associated pneumonia), Minimally displaced zone I fracture of sacrum ([address] MRSA (methicillin resistant staph aureus) culture positive, MRSA (methicillin resistant Staphylococcus aureus), Mucus plugging of bronchi, NSTEMI (non-ST elevated myocardial infarction) ([facility] Osteomyelitis of mandible, Osteoporosis with pathological fracture, Post herpetic neuralgia, Pressure ulcer of left heel, stage 3 (HCC), Proximal humerus fracture, Rheumatoid arthritis ([address] Shingles, Sleep apnea, Status post incision and drainage, Surgical wound dehiscence, initial encounter (at [facility] harvest site), Temporal arteritis ([facility] Tobacco use, Tracheomalacia, and Vitreous hemorrhage, right eye ([facility] ECHOCARDIOGRAM Reviewed: Yes   Patient's Ejection Fraction (EF) is greater than 40%    Weights Reviewed:  Yes   Admission weight: 172 lb (78 kg)   Wt Readings from Last 3 Encounters:   02/19/23 172 lb 7 oz (78.2 kg)   02/17/23 171 lb 3.2 oz (77.7 kg)   02/03/23 175 lb 12.8 oz (79.7 kg)     Intake & Output Reviewed: Yes     Intake/Output Summary (Last 24 hours) at 2/19/2023 0820  Last data filed at 2/19/2023 0428  Gross per 24 hour   Intake 591 ml   Output 200 ml   Net 391 ml     Medications Reviewed: Yes     SCHEDULED HOSPITAL MEDICATIONS:   clopidogrel  75 mg Oral Daily    calcium carbonate  500 mg Oral Daily    cetirizine  10 mg Oral Daily    Roflumilast  500 mcg Oral Daily    DULoxetine  60 mg Oral BID    gabapentin  300 mg Oral BID    latanoprost  1 drop Both Eyes Nightly    morphine  15 mg Oral BID    pantoprazole  40 mg Oral QAM AC    sodium chloride flush  10 mL IntraVENous 2 times per day    enoxaparin  40 mg SubCUTAneous Daily    methylPREDNISolone  40 mg IntraVENous Q12H    Followed by    Kelin Murphy ON 2/20/2023] predniSONE  40 mg Oral Daily    sacubitril-valsartan  1 tablet Oral BID    levofloxacin  250 mg IntraVENous Q24H    insulin lispro  0-16 Units SubCUTAneous TID     insulin lispro  0-9 Units SubCUTAneous Nightly    insulin glargine  15 Units SubCUTAneous BID    ipratropium-albuterol  1 ampule Inhalation 4x daily       ACE/ARB/ARNI is REQUIRED for EF </= 23% SYSTOLIC FAILURE:   ACE[de-identified] None  ARB[de-identified] None  ARNI[de-identified] Sacubitril/Valsartan-Entresto    Evidenced-Based Beta Blocker is REQUIRED for EF </= 63% SYSTOLIC FAILURE:   [de-identified]None    Diuretics:  [de-identified]None    Diet Reviewed: Yes   ADULT DIET; Regular; 4 carb choices (60 gm/meal)    Goal of Care Reviewed: Yes   Patient and/or Family's stated Goal of Care this Admission: reduce shortness of breath, increase activity tolerance, and be more comfortable prior to discharge.

## 2023-02-19 NOTE — PROGRESS NOTES
Pulmonary Progress Note    CC: COPD    Subjective:   Somewhat improving  2 L O2-no home O2  No sputum production  Still with wheezes          Intake/Output Summary (Last 24 hours) at 2/19/2023 0743  Last data filed at 2/19/2023 0428  Gross per 24 hour   Intake 591 ml   Output 200 ml   Net 391 ml       Exam:   BP (!) 101/46   Pulse 95   Temp 97.3 °F (36.3 °C) (Oral)   Resp 18   Ht 5' 8\" (1.727 m)   Wt 172 lb 7 oz (78.2 kg)   SpO2 95%   BMI 26.22 kg/m²  on 2 L  Gen: No distress. Eyes: PERRL. No sclera icterus. No conjunctival injection. ENT: No discharge. Pharynx clear. Neck: Trachea midline. No obvious mass. Resp: + accessory muscle use. No crackles. Bilateral wheezes. Few rhonchi. No dullness on percussion. CV: Regular rate. Regular rhythm. No murmur or rub. No edema. GI: Non-tender. Non-distended. No hernia. Skin: Warm and dry. No nodule on exposed extremities. Lymph: No cervical LAD. No supraclavicular LAD. M/S: No cyanosis. + joint deformity. No clubbing. Neuro: Awake. Alert. Moves all four extremities. Psych: Oriented x 3.  No anxiety    Scheduled Meds:   clopidogrel  75 mg Oral Daily    calcium carbonate  500 mg Oral Daily    cetirizine  10 mg Oral Daily    Roflumilast  500 mcg Oral Daily    DULoxetine  60 mg Oral BID    gabapentin  300 mg Oral BID    latanoprost  1 drop Both Eyes Nightly    morphine  15 mg Oral BID    pantoprazole  40 mg Oral QAM AC    sodium chloride flush  10 mL IntraVENous 2 times per day    enoxaparin  40 mg SubCUTAneous Daily    methylPREDNISolone  40 mg IntraVENous Q12H    Followed by    Janine Jesus ON 2/20/2023] predniSONE  40 mg Oral Daily    sacubitril-valsartan  1 tablet Oral BID    levofloxacin  250 mg IntraVENous Q24H    insulin lispro  0-16 Units SubCUTAneous TID WC    insulin lispro  0-9 Units SubCUTAneous Nightly    insulin glargine  15 Units SubCUTAneous BID    ipratropium-albuterol  1 ampule Inhalation 4x daily     Continuous Infusions:   dextrose sodium chloride 5 mL/hr at 02/18/23 1242     PRN Meds:  docusate sodium, glucose, dextrose bolus **OR** dextrose bolus, glucagon (rDNA), dextrose, sodium chloride flush, sodium chloride, ondansetron **OR** ondansetron, polyethylene glycol, acetaminophen **OR** acetaminophen, albuterol    Labs:  CBC:   Recent Labs     02/17/23  1541 02/18/23  0424   WBC 6.1 4.7   HGB 9.6* 10.4*   HCT 29.6* 31.1*   MCV 89.6 88.3    233     BMP:   Recent Labs     02/17/23  1541 02/18/23  0424    134*   K 3.8 4.3   CL 97* 96*   CO2 26 26   BUN 23* 22*   CREATININE 1.3* 1.4*     LIVER PROFILE:   Recent Labs     02/17/23  1541 02/18/23  0424   AST 11* 14*   ALT 6* 10   BILITOT 0.5 0.4   ALKPHOS 73 79     PT/INR: No results for input(s): PROTIME, INR in the last 72 hours. APTT: No results for input(s): APTT in the last 72 hours. UA:  Recent Labs     02/17/23  1721   COLORU Yellow   PHUR 6.0   WBCUA 0-2   RBCUA 3-4   BACTERIA Rare*   CLARITYU Clear   SPECGRAV 1.010   LEUKOCYTESUR Negative   UROBILINOGEN 0.2   BILIRUBINUR Negative   BLOODU TRACE-INTACT*   GLUCOSEU Negative     No results for input(s): PHART, [id number], PO2ART in the last 72 hours. Cultures:   2/17 COVID-19 not detected    Films:      CT chest 2/17 imaging was reviewed by me and showed   1. No evidence of pulmonary embolism or acute pulmonary abnormality. 2.  Multiple bilateral lung cysts which are larger but still thin walled. 3.  Small fixed hiatus hernia   4. Mild opacity in the lingula and left lower lobe            ASSESSMENT:  Acute hypoxemic respiratory failure  COPD with acute exacerbation  Tracheobronchitis   Acute kidney injury  Hyperglycemia  Chronic systolic CHF EF 30 to 44%  Chronic pain on home MS Contin  Rheumatoid arthritis on Enbrel and prednisone 4 mg/day  followed by rheumatology     PLAN:  Supplemental oxygen to maintain SaO2 >92%; wean as tolerated  Intensive inhaled bronchodilator therapy  Continue IV solumedrol 40 mg IV Q12 hrs. Plan to switch to oral prednisone taper when improved  Levaquin day #2  Acapella and Mucinex  DC IV fluid  DVT prophylaxis: Lovenox  Discussed with internal medicine

## 2023-02-19 NOTE — PROGRESS NOTES
02/18/23 1900   RT Protocol   History Pulmonary Disease 2   Respiratory pattern 0   Breath sounds 2   Cough 0   Indications for Bronchodilator Therapy Decreased or absent breath sounds   Bronchodilator Assessment Score 4   RT Inhaler-Nebulizer Bronchodilator Protocol Note    There is a bronchodilator order in the chart from a provider indicating to follow the RT Bronchodilator Protocol and there is an Initiate RT Inhaler-Nebulizer Bronchodilator Protocol order as well (see protocol at bottom of note). CXR Findings:  XR CHEST PORTABLE    Result Date: 2/17/2023  1. Linear opacities at the lung bases favored to represent atelectasis or scarring. 2. Borderline cardiomegaly without evidence of CHF. 3. COPD. The findings from the last RT Protocol Assessment were as follows:   History Pulmonary Disease: Chronic pulmonary disease  Respiratory Pattern: Regular pattern and RR 12-20 bpm  Breath Sounds: Slightly diminished and/or crackles  Cough: Strong, spontaneous, non-productive  Indication for Bronchodilator Therapy: Decreased or absent breath sounds  Bronchodilator Assessment Score: 4    Aerosolized bronchodilator medication orders have been revised according to the RT Inhaler-Nebulizer Bronchodilator Protocol below. Respiratory Therapist to perform RT Therapy Protocol Assessment initially then follow the protocol. Repeat RT Therapy Protocol Assessment PRN for score 0-3 or on second treatment, BID, and PRN for scores above 3. No Indications - adjust the frequency to every 6 hours PRN wheezing or bronchospasm, if no treatments needed after 48 hours then discontinue using Per Protocol order mode. If indication present, adjust the RT bronchodilator orders based on the Bronchodilator Assessment Score as indicated below.   Use Inhaler orders unless patient has one or more of the following: on home nebulizer, not able to hold breath for 10 seconds, is not alert and oriented, cannot activate and use MDI correctly, or respiratory rate 25 breaths per minute or more, then use the equivalent nebulizer order(s) with same Frequency and PRN reasons based on the score. If a patient is on this medication at home then do not decrease Frequency below that used at home. 0-3 - enter or revise RT bronchodilator order(s) to equivalent RT Bronchodilator order with Frequency of every 4 hours PRN for wheezing or increased work of breathing using Per Protocol order mode. 4-6 - enter or revise RT Bronchodilator order(s) to two equivalent RT bronchodilator orders with one order with BID Frequency and one order with Frequency of every 4 hours PRN wheezing or increased work of breathing using Per Protocol order mode. 7-10 - enter or revise RT Bronchodilator order(s) to two equivalent RT bronchodilator orders with one order with TID Frequency and one order with Frequency of every 4 hours PRN wheezing or increased work of breathing using Per Protocol order mode. 11-13 - enter or revise RT Bronchodilator order(s) to one equivalent RT bronchodilator order with QID Frequency and an Albuterol order with Frequency of every 4 hours PRN wheezing or increased work of breathing using Per Protocol order mode. Greater than 13 - enter or revise RT Bronchodilator order(s) to one equivalent RT bronchodilator order with every 4 hours Frequency and an Albuterol order with Frequency of every 2 hours PRN wheezing or increased work of breathing using Per Protocol order mode.      Electronically signed by Ari Villa RCP on 2/18/2023 at 7:05 PM

## 2023-02-19 NOTE — PROGRESS NOTES
Patient assessed and AM medications given. Pressures continue to be soft. Patient denies any further needs at this time. Stable on 2L O2. Call light within reach. Family at bedside.

## 2023-02-19 NOTE — PROGRESS NOTES
RT Inhaler-Nebulizer Bronchodilator Protocol Note    There is a bronchodilator order in the chart from a provider indicating to follow the RT Bronchodilator Protocol and there is an Initiate RT Inhaler-Nebulizer Bronchodilator Protocol order as well (see protocol at bottom of note). CXR Findings:  XR CHEST PORTABLE    Result Date: 2/17/2023  1. Linear opacities at the lung bases favored to represent atelectasis or scarring. 2. Borderline cardiomegaly without evidence of CHF. 3. COPD. The findings from the last RT Protocol Assessment were as follows:   History Pulmonary Disease: Chronic pulmonary disease  Respiratory Pattern: Regular pattern and RR 12-20 bpm  Breath Sounds: Slightly diminished and/or crackles  Cough: Strong, spontaneous, non-productive  Indication for Bronchodilator Therapy: Decreased or absent breath sounds  Bronchodilator Assessment Score: 4    Aerosolized bronchodilator medication orders have been revised according to the RT Inhaler-Nebulizer Bronchodilator Protocol below. Respiratory Therapist to perform RT Therapy Protocol Assessment initially then follow the protocol. Repeat RT Therapy Protocol Assessment PRN for score 0-3 or on second treatment, BID, and PRN for scores above 3. No Indications - adjust the frequency to every 6 hours PRN wheezing or bronchospasm, if no treatments needed after 48 hours then discontinue using Per Protocol order mode. If indication present, adjust the RT bronchodilator orders based on the Bronchodilator Assessment Score as indicated below. Use Inhaler orders unless patient has one or more of the following: on home nebulizer, not able to hold breath for 10 seconds, is not alert and oriented, cannot activate and use MDI correctly, or respiratory rate 25 breaths per minute or more, then use the equivalent nebulizer order(s) with same Frequency and PRN reasons based on the score.   If a patient is on this medication at home then do not decrease Frequency below that used at home. 0-3 - enter or revise RT bronchodilator order(s) to equivalent RT Bronchodilator order with Frequency of every 4 hours PRN for wheezing or increased work of breathing using Per Protocol order mode. 4-6 - enter or revise RT Bronchodilator order(s) to two equivalent RT bronchodilator orders with one order with BID Frequency and one order with Frequency of every 4 hours PRN wheezing or increased work of breathing using Per Protocol order mode. 7-10 - enter or revise RT Bronchodilator order(s) to two equivalent RT bronchodilator orders with one order with TID Frequency and one order with Frequency of every 4 hours PRN wheezing or increased work of breathing using Per Protocol order mode. 11-13 - enter or revise RT Bronchodilator order(s) to one equivalent RT bronchodilator order with QID Frequency and an Albuterol order with Frequency of every 4 hours PRN wheezing or increased work of breathing using Per Protocol order mode. Greater than 13 - enter or revise RT Bronchodilator order(s) to one equivalent RT bronchodilator order with every 4 hours Frequency and an Albuterol order with Frequency of every 2 hours PRN wheezing or increased work of breathing using Per Protocol order mode.          Electronically signed by Jigna Stevenson RCP on 2/19/2023 at 9:49 AM

## 2023-02-20 LAB
ANION GAP SERPL CALCULATED.3IONS-SCNC: 11 MMOL/L (ref 3–16)
BUN BLDV-MCNC: 31 MG/DL (ref 7–20)
CALCIUM SERPL-MCNC: 8.3 MG/DL (ref 8.3–10.6)
CHLORIDE BLD-SCNC: 98 MMOL/L (ref 99–110)
CO2: 22 MMOL/L (ref 21–32)
CREAT SERPL-MCNC: 0.9 MG/DL (ref 0.6–1.2)
GFR SERPL CREATININE-BSD FRML MDRD: >60 ML/MIN/{1.73_M2}
GLUCOSE BLD-MCNC: 100 MG/DL (ref 70–99)
GLUCOSE BLD-MCNC: 196 MG/DL (ref 70–99)
GLUCOSE BLD-MCNC: 212 MG/DL (ref 70–99)
GLUCOSE BLD-MCNC: 248 MG/DL (ref 70–99)
GLUCOSE BLD-MCNC: 256 MG/DL (ref 70–99)
GLUCOSE BLD-MCNC: 315 MG/DL (ref 70–99)
GLUCOSE BLD-MCNC: 66 MG/DL (ref 70–99)
PERFORMED ON: ABNORMAL
POTASSIUM SERPL-SCNC: 4.3 MMOL/L (ref 3.5–5.1)
SODIUM BLD-SCNC: 131 MMOL/L (ref 136–145)

## 2023-02-20 PROCEDURE — 6370000000 HC RX 637 (ALT 250 FOR IP): Performed by: INTERNAL MEDICINE

## 2023-02-20 PROCEDURE — 99233 SBSQ HOSP IP/OBS HIGH 50: CPT | Performed by: INTERNAL MEDICINE

## 2023-02-20 PROCEDURE — 94640 AIRWAY INHALATION TREATMENT: CPT

## 2023-02-20 PROCEDURE — 6360000002 HC RX W HCPCS: Performed by: INTERNAL MEDICINE

## 2023-02-20 PROCEDURE — 2060000000 HC ICU INTERMEDIATE R&B

## 2023-02-20 PROCEDURE — 36415 COLL VENOUS BLD VENIPUNCTURE: CPT

## 2023-02-20 PROCEDURE — 80048 BASIC METABOLIC PNL TOTAL CA: CPT

## 2023-02-20 PROCEDURE — 99221 1ST HOSP IP/OBS SF/LOW 40: CPT | Performed by: INTERNAL MEDICINE

## 2023-02-20 PROCEDURE — 2700000000 HC OXYGEN THERAPY PER DAY

## 2023-02-20 PROCEDURE — 2580000003 HC RX 258: Performed by: INTERNAL MEDICINE

## 2023-02-20 PROCEDURE — 94761 N-INVAS EAR/PLS OXIMETRY MLT: CPT

## 2023-02-20 RX ORDER — INSULIN GLARGINE 100 [IU]/ML
20 INJECTION, SOLUTION SUBCUTANEOUS 2 TIMES DAILY
Status: DISCONTINUED | OUTPATIENT
Start: 2023-02-20 | End: 2023-02-21 | Stop reason: HOSPADM

## 2023-02-20 RX ORDER — LEVOFLOXACIN 500 MG/1
250 TABLET, FILM COATED ORAL DAILY
Status: DISCONTINUED | OUTPATIENT
Start: 2023-02-20 | End: 2023-02-21 | Stop reason: HOSPADM

## 2023-02-20 RX ADMIN — CALCIUM CARBONATE 500 MG: 500 TABLET, CHEWABLE ORAL at 08:15

## 2023-02-20 RX ADMIN — CETIRIZINE HYDROCHLORIDE 10 MG: 10 TABLET ORAL at 08:15

## 2023-02-20 RX ADMIN — ROFLUMILAST 500 MCG: 500 TABLET ORAL at 08:15

## 2023-02-20 RX ADMIN — DULOXETINE HYDROCHLORIDE 60 MG: 60 CAPSULE, DELAYED RELEASE ORAL at 20:33

## 2023-02-20 RX ADMIN — GABAPENTIN 300 MG: 300 CAPSULE ORAL at 20:33

## 2023-02-20 RX ADMIN — INSULIN LISPRO 12 UNITS: 100 INJECTION, SOLUTION INTRAVENOUS; SUBCUTANEOUS at 08:17

## 2023-02-20 RX ADMIN — SACUBITRIL AND VALSARTAN 1 TABLET: 24; 26 TABLET, FILM COATED ORAL at 08:15

## 2023-02-20 RX ADMIN — INSULIN LISPRO 10 UNITS: 100 INJECTION, SOLUTION INTRAVENOUS; SUBCUTANEOUS at 08:16

## 2023-02-20 RX ADMIN — PREDNISONE 40 MG: 20 TABLET ORAL at 08:15

## 2023-02-20 RX ADMIN — ENOXAPARIN SODIUM 40 MG: 100 INJECTION SUBCUTANEOUS at 08:15

## 2023-02-20 RX ADMIN — INSULIN LISPRO 10 UNITS: 100 INJECTION, SOLUTION INTRAVENOUS; SUBCUTANEOUS at 12:10

## 2023-02-20 RX ADMIN — INSULIN GLARGINE 15 UNITS: 100 INJECTION, SOLUTION SUBCUTANEOUS at 08:16

## 2023-02-20 RX ADMIN — INSULIN GLARGINE 20 UNITS: 100 INJECTION, SOLUTION SUBCUTANEOUS at 20:34

## 2023-02-20 RX ADMIN — LEVOFLOXACIN 250 MG: 500 TABLET, FILM COATED ORAL at 09:38

## 2023-02-20 RX ADMIN — MORPHINE SULFATE 15 MG: 15 TABLET, FILM COATED, EXTENDED RELEASE ORAL at 20:33

## 2023-02-20 RX ADMIN — ACETAMINOPHEN 650 MG: 325 TABLET ORAL at 04:05

## 2023-02-20 RX ADMIN — IPRATROPIUM BROMIDE AND ALBUTEROL SULFATE 1 AMPULE: 2.5; .5 SOLUTION RESPIRATORY (INHALATION) at 21:16

## 2023-02-20 RX ADMIN — IPRATROPIUM BROMIDE AND ALBUTEROL SULFATE 1 AMPULE: 2.5; .5 SOLUTION RESPIRATORY (INHALATION) at 15:09

## 2023-02-20 RX ADMIN — ACETAMINOPHEN 650 MG: 325 TABLET ORAL at 09:36

## 2023-02-20 RX ADMIN — PANTOPRAZOLE SODIUM 40 MG: 40 TABLET, DELAYED RELEASE ORAL at 06:59

## 2023-02-20 RX ADMIN — MORPHINE SULFATE 15 MG: 15 TABLET, FILM COATED, EXTENDED RELEASE ORAL at 08:15

## 2023-02-20 RX ADMIN — LATANOPROST 1 DROP: 50 SOLUTION OPHTHALMIC at 20:38

## 2023-02-20 RX ADMIN — DULOXETINE HYDROCHLORIDE 60 MG: 60 CAPSULE, DELAYED RELEASE ORAL at 08:15

## 2023-02-20 RX ADMIN — IPRATROPIUM BROMIDE AND ALBUTEROL SULFATE 1 AMPULE: 2.5; .5 SOLUTION RESPIRATORY (INHALATION) at 10:51

## 2023-02-20 RX ADMIN — INSULIN LISPRO 4 UNITS: 100 INJECTION, SOLUTION INTRAVENOUS; SUBCUTANEOUS at 12:10

## 2023-02-20 RX ADMIN — IPRATROPIUM BROMIDE AND ALBUTEROL SULFATE 1 AMPULE: 2.5; .5 SOLUTION RESPIRATORY (INHALATION) at 07:10

## 2023-02-20 RX ADMIN — Medication 10 ML: at 20:33

## 2023-02-20 RX ADMIN — Medication 10 ML: at 08:16

## 2023-02-20 RX ADMIN — GABAPENTIN 300 MG: 300 CAPSULE ORAL at 08:15

## 2023-02-20 RX ADMIN — CLOPIDOGREL BISULFATE 75 MG: 75 TABLET ORAL at 08:15

## 2023-02-20 RX ADMIN — INSULIN LISPRO 2 UNITS: 100 INJECTION, SOLUTION INTRAVENOUS; SUBCUTANEOUS at 20:35

## 2023-02-20 ASSESSMENT — PAIN DESCRIPTION - LOCATION: LOCATION: HEAD

## 2023-02-20 ASSESSMENT — PAIN SCALES - GENERAL: PAINLEVEL_OUTOF10: 10

## 2023-02-20 NOTE — PROGRESS NOTES
Patient in stable condition. Transferred care to [facility], [address] [place] [address].  Minerva Calle RN

## 2023-02-20 NOTE — PROGRESS NOTES
Pulmonary Progress Note    CC: COPD    Subjective:   She states she feels great   1.5  L O2-no home O2  No sputum production  Still with wheezes  Has about 20 PPY smoking         Intake/Output Summary (Last 24 hours) at 2/20/2023 0901  Last data filed at 2/20/2023 0214  Gross per 24 hour   Intake --   Output 650 ml   Net -650 ml         Exam:   /63   Pulse (!) 102   Temp 97.9 °F (36.6 °C) (Oral)   Resp 16   Ht 5' 8\" (1.727 m)   Wt 181 lb 9.6 oz (82.4 kg)   SpO2 99%   BMI 27.61 kg/m²  on 2 L  Gen: No distress. Eyes: PERRL. No sclera icterus. No conjunctival injection. ENT: No discharge. Pharynx clear. Neck: Trachea midline. No obvious mass. Resp: + accessory muscle use. Occasional wheezes. Few rhonchi. No dullness on percussion. CV: Regular rate. Regular rhythm. No murmur or rub. No edema. GI: Non-tender. Non-distended. No hernia. Skin: Warm and dry. No nodule on exposed extremities. Lymph: No cervical LAD. No supraclavicular LAD. M/S: No cyanosis. + joint deformity. No clubbing. Neuro: Awake. Alert. Moves all four extremities. Psych: Oriented x 3.  No anxiety    Scheduled Meds:   insulin lispro  10 Units SubCUTAneous TID     clopidogrel  75 mg Oral Daily    calcium carbonate  500 mg Oral Daily    cetirizine  10 mg Oral Daily    Roflumilast  500 mcg Oral Daily    DULoxetine  60 mg Oral BID    gabapentin  300 mg Oral BID    latanoprost  1 drop Both Eyes Nightly    morphine  15 mg Oral BID    pantoprazole  40 mg Oral QAM AC    sodium chloride flush  10 mL IntraVENous 2 times per day    enoxaparin  40 mg SubCUTAneous Daily    predniSONE  40 mg Oral Daily    sacubitril-valsartan  1 tablet Oral BID    levofloxacin  250 mg IntraVENous Q24H    insulin lispro  0-16 Units SubCUTAneous TID     insulin lispro  0-9 Units SubCUTAneous Nightly    insulin glargine  15 Units SubCUTAneous BID    ipratropium-albuterol  1 ampule Inhalation 4x daily     Continuous Infusions:   dextrose      sodium chloride 5 mL/hr at 02/19/23 1154     PRN Meds:  docusate sodium, glucose, dextrose bolus **OR** dextrose bolus, glucagon (rDNA), dextrose, sodium chloride flush, sodium chloride, ondansetron **OR** ondansetron, polyethylene glycol, acetaminophen **OR** acetaminophen, albuterol    Labs:  CBC:   Recent Labs     02/17/23  1541 02/18/23  0424   WBC 6.1 4.7   HGB 9.6* 10.4*   HCT 29.6* 31.1*   MCV 89.6 88.3    233       BMP:   Recent Labs     02/18/23  0424 02/19/23  0806 02/20/23  0449   * 127* 131*   K 4.3 4.0 4.3   CL 96* 91* 98*   CO2 26 23 22   BUN 22* 31* 31*   CREATININE 1.4* 1.1 0.9       LIVER PROFILE:   Recent Labs     02/17/23  1541 02/18/23  0424   AST 11* 14*   ALT 6* 10   BILITOT 0.5 0.4   ALKPHOS 73 79       PT/INR: No results for input(s): PROTIME, INR in the last 72 hours. APTT: No results for input(s): APTT in the last 72 hours. UA:  Recent Labs     02/17/23  1721   COLORU Yellow   PHUR 6.0   WBCUA 0-2   RBCUA 3-4   BACTERIA Rare*   CLARITYU Clear   SPECGRAV 1.010   LEUKOCYTESUR Negative   UROBILINOGEN 0.2   BILIRUBINUR Negative   BLOODU TRACE-INTACT*   GLUCOSEU Negative       No results for input(s): PHART, [id number], PO2ART in the last 72 hours. Cultures:   2/17 COVID-19 not detected        CT chest 2/17 imaging was reviewed by me and showed   1. No evidence of pulmonary embolism or acute pulmonary abnormality. 2.  Multiple bilateral lung cysts which are larger but still thin walled. 3.  Small fixed hiatus hernia   4.   Mild opacity in the lingula and left lower lobe            ASSESSMENT:  Acute hypoxemic respiratory failure  Cysytic lung disease   COPD with acute exacerbation  Tracheobronchitis   Acute kidney injury  Hyperglycemia  Chronic systolic CHF EF 30 to 66%  Chronic pain on home MS Contin  Rheumatoid arthritis on Enbrel and prednisone 4 mg/day  followed by rheumatology     PLAN:  CT scan showing Cystic lesion ,they been present when review CT 2019 ,  I doubt PINEDA or histocytosis ,however follow up as OP needed  Supplemental oxygen to maintain SaO2 >92%; wean as tolerated  Intensive inhaled bronchodilator therapy   switch to oral prednisone tape rover 10 days   Levaquin day #3  Acapella and Mucinex/pulmonary toilet   DVT prophylaxis: Lovenox  Will Discusse with internal medicine

## 2023-02-20 NOTE — PROGRESS NOTES
Hospitalist Progress Note      PCP: Briana Meredith MD    Date of Admission: 2/17/2023    Subjective: feels better today, BS uncontrolled, O2 at 2L    Medications:  Reviewed    Infusion Medications    dextrose      sodium chloride 5 mL/hr at 02/19/23 1154     Scheduled Medications    levoFLOXacin  250 mg Oral Daily    insulin glargine  20 Units SubCUTAneous BID    insulin lispro  10 Units SubCUTAneous TID WC    clopidogrel  75 mg Oral Daily    calcium carbonate  500 mg Oral Daily    cetirizine  10 mg Oral Daily    Roflumilast  500 mcg Oral Daily    DULoxetine  60 mg Oral BID    gabapentin  300 mg Oral BID    latanoprost  1 drop Both Eyes Nightly    morphine  15 mg Oral BID    pantoprazole  40 mg Oral QAM AC    sodium chloride flush  10 mL IntraVENous 2 times per day    enoxaparin  40 mg SubCUTAneous Daily    predniSONE  40 mg Oral Daily    sacubitril-valsartan  1 tablet Oral BID    insulin lispro  0-16 Units SubCUTAneous TID WC    insulin lispro  0-9 Units SubCUTAneous Nightly    ipratropium-albuterol  1 ampule Inhalation 4x daily     PRN Meds: docusate sodium, glucose, dextrose bolus **OR** dextrose bolus, glucagon (rDNA), dextrose, sodium chloride flush, sodium chloride, ondansetron **OR** ondansetron, polyethylene glycol, acetaminophen **OR** acetaminophen, albuterol      Intake/Output Summary (Last 24 hours) at 2/20/2023 1603  Last data filed at 2/20/2023 1434  Gross per 24 hour   Intake 520 ml   Output 1100 ml   Net -580 ml       Physical Exam Performed:    BP (!) 95/53   Pulse 95   Temp 98 °F (36.7 °C) (Oral)   Resp 16   Ht 5' 8\" (1.727 m)   Wt 181 lb 9.6 oz (82.4 kg)   SpO2 98%   BMI 27.61 kg/m²     General: facial swelling. Alert. Eyes: PERRL. No sclera icterus. No conjunctiva injected. ENT: No discharge. Pharynx clear. Neck: Trachea midline. Normal thyroid. Resp: No accessory muscle use. minimal crackles. Mild wheezing. No rhonchi. No dullness on percussion. CV: Regular rate.  Regular rhythm. No mumur or rub. Trace edema. No JVD. Palpable pedal pulses. GI: Non-tender. Non-distended. No masses. No organmegaly. Normal bowel sounds. No hernia. Skin: Warm and dry. No nodule on exposed extremities. No rash on exposed extremities. Lymph: No cervical LAD. No supraclavicular LAD. M/S: No cyanosis. No joint deformity. No clubbing. Neuro: Awake. Follows commands. Positive pupils/gag/corneals. Normal pain response. Psych: Oriented to person, place, time. No anxiety or agitation. Labs:   Recent Labs     02/18/23 0424   WBC 4.7   HGB 10.4*   HCT 31.1*        Recent Labs     02/18/23  0424 02/19/23  0806 02/20/23  0449   * 127* 131*   K 4.3 4.0 4.3   CL 96* 91* 98*   CO2 26 23 22   BUN 22* 31* 31*   CREATININE 1.4* 1.1 0.9   CALCIUM 8.5 8.2* 8.3     Recent Labs     02/18/23 0424   AST 14*   ALT 10   BILITOT 0.4   ALKPHOS 79     No results for input(s): INR in the last 72 hours. No results for input(s): Estle Carrow in the last 72 hours. Urinalysis:      Lab Results   Component Value Date/Time    NITRU Negative 02/17/2023 05:21 PM    WBCUA 0-2 02/17/2023 05:21 PM    BACTERIA Rare 02/17/2023 05:21 PM    RBCUA 3-4 02/17/2023 05:21 PM    BLOODU TRACE-INTACT 02/17/2023 05:21 PM    SPECGRAV 1.010 02/17/2023 05:21 PM    GLUCOSEU Negative 02/17/2023 05:21 PM    GLUCOSEU NEGATIVE 03/20/2010 12:52 PM       Radiology:  CT CHEST PULMONARY EMBOLISM W CONTRAST   Final Result   1. No evidence of pulmonary embolism or acute pulmonary abnormality. 2.  Multiple bilateral lung cysts which are larger but still thin walled. 3.  Small fixed hiatus hernia      4. Mild opacity in the lingula and left lower lobe         XR CHEST PORTABLE   Final Result   1. Linear opacities at the lung bases favored to represent atelectasis or   scarring. 2. Borderline cardiomegaly without evidence of CHF. 3. COPD.              IP CONSULT TO HOSPITALIST  IP CONSULT TO PULMONOLOGY  IP CONSULT TO INFECTIOUS DISEASES    Assessment/Plan:    Active Hospital Problems    Diagnosis     Tracheobronchitis [J40]      Priority: Medium    Hyperglycemia [R73.9]      Priority: Medium    Acute kidney injury ([facility] [N17.9]      Priority: Medium    Acute respiratory failure with hypoxia ([facility]) [J96.01]      Priority: Medium    Pneumonia of left lower lobe due to infectious organism [J18.9]     COPD exacerbation ([facility]) [J44.1]     Chronic diastolic congestive heart failure ([facility]) [I50.32]     Immunocompromised state ([facility] [D84.9]     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 Patient presents emergency room with shortness of breath. She was placed on 15 L of oxygen for hypoxia. She has been weaned down from 4-->2 L. She was admitted to the ICU. Etiology of respiratory failure is likely COPD exacerbation. #Acute exacerbation of COPD. Use breathing treatments. Use steroids. She is on chronic azithromycin. She is presently on IV Rocephin and Doxy which we will continue. She takes Daliresp and Singulair at home. She has failed Dulera. #Hyponatremia likely related to above. Fluid restriction 1500 cc. Corrected sodium is 130 - improved to 131     #History of tracheomalacia. #Diabetes mellitus type 2 with hyperglycemia. Sugars are increased due to steroids. Use insulin. Increase lantus today     #CTPA shows lung cyst.  Appear to be chronic. Outpatient follow-up. No acute PE seen. #CAD status post CABG. Denies any chest pain. On Plavix. #Rheumatoid arthritis. On Enbrel and prednisone at home.   Hold Enbrel. #Hyperlipidemia on Lipitor. #Chronic combined systolic and diastolic heart failure. Continue Entresto. #Elevation of creatinine. Admission creatinine 1.4. Baseline 1.1. Stop IVF. #Lovenox for DVT prophylaxis. #Chronic pain syndrome-chronic opioid dependence uncomplicated. On MS Contin. Diet: ADULT DIET;  Regular; 4 carb choices (60 gm/meal); 1500 ml  Code Status: Full Code  PT/OT Eval Status: ordered    Evan Mac MD

## 2023-02-20 NOTE — PROGRESS NOTES
02/19/23 2000   RT Protocol   History Pulmonary Disease 2   Respiratory pattern 0   Breath sounds 2   Cough 0   Indications for Bronchodilator Therapy Decreased or absent breath sounds   Bronchodilator Assessment Score 4   RT Inhaler-Nebulizer Bronchodilator Protocol Note    There is a bronchodilator order in the chart from a provider indicating to follow the RT Bronchodilator Protocol and there is an Initiate RT Inhaler-Nebulizer Bronchodilator Protocol order as well (see protocol at bottom of note). CXR Findings:  No results found. The findings from the last RT Protocol Assessment were as follows:   History Pulmonary Disease: Chronic pulmonary disease  Respiratory Pattern: Regular pattern and RR 12-20 bpm  Breath Sounds: Slightly diminished and/or crackles  Cough: Strong, spontaneous, non-productive  Indication for Bronchodilator Therapy: Decreased or absent breath sounds  Bronchodilator Assessment Score: 4    Aerosolized bronchodilator medication orders have been revised according to the RT Inhaler-Nebulizer Bronchodilator Protocol below. Respiratory Therapist to perform RT Therapy Protocol Assessment initially then follow the protocol. Repeat RT Therapy Protocol Assessment PRN for score 0-3 or on second treatment, BID, and PRN for scores above 3. No Indications - adjust the frequency to every 6 hours PRN wheezing or bronchospasm, if no treatments needed after 48 hours then discontinue using Per Protocol order mode. If indication present, adjust the RT bronchodilator orders based on the Bronchodilator Assessment Score as indicated below. Use Inhaler orders unless patient has one or more of the following: on home nebulizer, not able to hold breath for 10 seconds, is not alert and oriented, cannot activate and use MDI correctly, or respiratory rate 25 breaths per minute or more, then use the equivalent nebulizer order(s) with same Frequency and PRN reasons based on the score.   If a patient is on this medication at home then do not decrease Frequency below that used at home. 0-3 - enter or revise RT bronchodilator order(s) to equivalent RT Bronchodilator order with Frequency of every 4 hours PRN for wheezing or increased work of breathing using Per Protocol order mode. 4-6 - enter or revise RT Bronchodilator order(s) to two equivalent RT bronchodilator orders with one order with BID Frequency and one order with Frequency of every 4 hours PRN wheezing or increased work of breathing using Per Protocol order mode. 7-10 - enter or revise RT Bronchodilator order(s) to two equivalent RT bronchodilator orders with one order with TID Frequency and one order with Frequency of every 4 hours PRN wheezing or increased work of breathing using Per Protocol order mode. 11-13 - enter or revise RT Bronchodilator order(s) to one equivalent RT bronchodilator order with QID Frequency and an Albuterol order with Frequency of every 4 hours PRN wheezing or increased work of breathing using Per Protocol order mode. Greater than 13 - enter or revise RT Bronchodilator order(s) to one equivalent RT bronchodilator order with every 4 hours Frequency and an Albuterol order with Frequency of every 2 hours PRN wheezing or increased work of breathing using Per Protocol order mode.        Electronically signed by Author Mary RCP on 2/19/2023 at 8:13 PM

## 2023-02-20 NOTE — FLOWSHEET NOTE
02/20/23 0739   Vital Signs   Temp 97.9 °F (36.6 °C)   Temp Source Oral   Heart Rate (!) 102   Heart Rate Source Monitor   Resp 16   /63   MAP (Calculated) 83   BP Location Left lower arm   BP Method Automatic   Patient Position Semi fowlers; Lying right side   Pain Assessment   Pain Assessment None - Denies Pain   Care Plan - Pain Goals   Verbalizes/displays adequate comfort level or baseline comfort level Encourage patient to monitor pain and request assistance   Oxygen Therapy   SpO2 99 %   O2 Device Nasal cannula   O2 Flow Rate (L/min) 2 L/min   Pt resting quietly in bed no s/s of distress noted no needs voiced see flowsheet for full assessment call light within reach and bed in low position for pt safety

## 2023-02-20 NOTE — CONSULTS
[facility] Infectious Disease (Wound Care) Consult Note      Ashley Taveras     : 1950    DATE OF VISIT:  2023  DATE OF ADMISSION:  2023       Subjective:     Danica Borja is a 71 y.o. female whom I've been asked to see by Dr. Thony Barragan for F/U of her sacral pressure ulcer. Chief Complaint   Patient presents with    Shortness of Breath     C/o cough and congestion for the last week. Increased SOB starting today. O2 at triage 79-81% on RA      HPI:  we just saw her in wound-care on Friday, and she was doing fairly well then, with some mild chronic stable exertional dyspnea, mild cough, no fever or CP, otherwise feeling ok, minor wound debridement done, but came back to the OR later Friday with increasing shortness of breath and hypoxemia. Treated with oxygen supplementation, steroids, LVQ for COPD / bronchiectasis exacerbation today. Was actually in the ICU that first night, given high FIO2. Feeling better today, maybe not back to baseline but much better than Friday night. No fever, no side effects from Abx, doing her best to stay off her wound.      Ms. Alba Galarza has a past medical history of Acute on chronic diastolic heart failure due to coronary artery disease ([address] Acute respiratory failure with hypoxia ([address] Atherosclerosis of native artery of right lower extremity with rest pain ([address] Back pain, Branch retinal vein occlusion, Bronchiectasis with acute exacerbation (HCC), Cellulitis and abscess of left leg, Cellulitis of left lower extremity, CHF (congestive heart failure) (HCC), Closed compression fracture of thoracic vertebra (HCC), Closed fracture of facial bone with routine healing, Closed jaw fracture ([address] Community acquired pneumonia of left lower lobe of lung, Compression fracture of L1 lumbar vertebra (HCC), COPD (chronic obstructive pulmonary disease) ([address] COVID, Fracture of tibial plateau, closed, left, initial encounter, HCAP (healthcare-associated pneumonia), Minimally displaced zone I fracture of sacrum (HCC), MRSA (methicillin resistant staph aureus) culture positive, MRSA (methicillin resistant Staphylococcus aureus), Mucus plugging of bronchi, NSTEMI (non-ST elevated myocardial infarction) ([address] Osteomyelitis of mandible, Osteoporosis with pathological fracture, Post herpetic neuralgia, Pressure ulcer of left heel, stage 3 (HCC), Proximal humerus fracture, Rheumatoid arthritis ([address] Shingles, Sleep apnea, Status post incision and drainage, Surgical wound dehiscence, initial encounter (at [facility] harvest site), Temporal arteritis ([address] Tobacco use, Tracheomalacia, and Vitreous hemorrhage, right eye ([address] She has a past surgical history that includes hernia repair; Mandible fracture surgery; Foot surgery; Elbow surgery; Cataract removal; Tubal ligation; Knee arthroscopy; Kyphosis surgery; laminectomy; Spinal fusion; Septoplasty (05/07/2013); Artery surgery (05/30/2013); Upper gastrointestinal endoscopy (04/08/2014); bronchoscopy; bronchoscopy (N/A, 06/12/2019); Mandible fracture surgery (02/2020); back surgery (08/2020); other surgical history (01/08/2021); Pressure ulcer debridement (N/A, 01/08/2021); Artery Biopsy (Right, 03/01/2021); Coronary artery bypass graft (N/A, 05/03/2021); Mediastinoscopy (N/A, 05/05/2021); Cardiac surgery (05/03/2021); Leg Surgery (Left, 7/13/2021); IR MIDLINE CATH (7/14/2021); transesophageal echocardiogram (11/02/2021); and Colonoscopy. Her family history includes Asthma in an other family member; Diabetes in her brother, mother, and sister; Heart Disease in her brother and brother; Hypertension in her mother. Ms. Mono Hoyos reports that she quit smoking about 31 years ago. Her smoking use included cigarettes. She has a 20.00 pack-year smoking history. She has never used smokeless tobacco. She reports that she does not currently use alcohol. She reports that she does not use drugs.     Current Facility-Administered Medications: insulin lispro (HUMALOG) injection vial 10 Units, 10 Units, SubCUTAneous, TID WC  clopidogrel (PLAVIX) tablet 75 mg, 75 mg, Oral, Daily  calcium carbonate (TUMS) chewable tablet 500 mg, 500 mg, Oral, Daily  cetirizine (ZYRTEC) tablet 10 mg, 10 mg, Oral, Daily  Roflumilast (DALIRESP) tablet 500 mcg, 500 mcg, Oral, Daily  DULoxetine (CYMBALTA) extended release capsule 60 mg, 60 mg, Oral, BID  docusate sodium (COLACE) capsule 100 mg, 100 mg, Oral, BID PRN  gabapentin (NEURONTIN) capsule 300 mg, 300 mg, Oral, BID  latanoprost (XALATAN) 0.005 % ophthalmic solution 1 drop, 1 drop, Both Eyes, Nightly  morphine (MS CONTIN) extended release tablet 15 mg, 15 mg, Oral, BID  pantoprazole (PROTONIX) tablet 40 mg, 40 mg, Oral, QAM AC  glucose chewable tablet 16 g, 4 tablet, Oral, PRN  dextrose bolus 10% 125 mL, 125 mL, IntraVENous, PRN **OR** dextrose bolus 10% 250 mL, 250 mL, IntraVENous, PRN  glucagon (rDNA) injection 1 mg, 1 mg, SubCUTAneous, PRN  dextrose 10 % infusion, , IntraVENous, Continuous PRN  sodium chloride flush 0.9 % injection 10 mL, 10 mL, IntraVENous, 2 times per day  sodium chloride flush 0.9 % injection 10 mL, 10 mL, IntraVENous, PRN  0.9 % sodium chloride infusion, , IntraVENous, PRN  ondansetron (ZOFRAN-ODT) disintegrating tablet 4 mg, 4 mg, Oral, Q8H PRN **OR** ondansetron (ZOFRAN) injection 4 mg, 4 mg, IntraVENous, Q6H PRN  polyethylene glycol (GLYCOLAX) packet 17 g, 17 g, Oral, Daily PRN  enoxaparin (LOVENOX) injection 40 mg, 40 mg, SubCUTAneous, Daily  acetaminophen (TYLENOL) tablet 650 mg, 650 mg, Oral, Q6H PRN **OR** acetaminophen (TYLENOL) suppository 650 mg, 650 mg, Rectal, Q6H PRN  albuterol (PROVENTIL) nebulizer solution 2.5 mg, 2.5 mg, Nebulization, Q4H PRN  [COMPLETED] methylPREDNISolone sodium (SOLU-MEDROL) injection 40 mg, 40 mg, IntraVENous, Q12H **FOLLOWED BY** predniSONE (DELTASONE) tablet 40 mg, 40 mg, Oral, Daily  sacubitril-valsartan (ENTRESTO) 24-26 MG per tablet 1 tablet, 1 tablet, Oral,  BID  levoFLOXacin (LEVAQUIN) 250 MG/50ML infusion 250 mg, 250 mg, IntraVENous, Q24H  insulin lispro (HUMALOG) injection vial 0-16 Units, 0-16 Units, SubCUTAneous, TID WC  insulin lispro (HUMALOG) injection vial 0-9 Units, 0-9 Units, SubCUTAneous, Nightly  insulin glargine (LANTUS) injection vial 15 Units, 15 Units, SubCUTAneous, BID  ipratropium-albuterol (DUONEB) nebulizer solution 1 ampule, 1 ampule, Inhalation, 4x daily     This is day 3 of LVQ, after doses of CTX-Jennifer on Friday. Allergies: Atenolol    Pertinent items from the review of systems are discussed in the HPI; the remainder of the ROS was reviewed and is negative. Objective:     Vital signs over the last 24 hours:  Temp  Av.8 °F (36.6 °C)  Min: 97.2 °F (36.2 °C)  Max: 98.4 °F (36.9 °C)  Pulse  Av.8  Min: 94  Max: 000  Systolic (57RLP), LUP:45 , Min:85 , FGS:096   Diastolic (10CJF), WOX:49, Min:46, Max:74  Resp  Av  Min: 18  Max: 18  SpO2  Av %  Min: 95 %  Max: 97 %    Constitutional:  well-developed, well-nourished, NAD, conversant, just fatigued  Psychiatric:  oriented to person, place and time; mood and affect appropriate for the situation   Eyes:  pupils equal, round and reactive to light; sclerae anicteric, conjunctivae not pale  ENT:  atraumatic; oral mucosa moist, no thrush or ulcers  Resp:  lungs with mild wheezing / rhonchi to auscultation BL; no use of accessory muscles or other signs of resp distress  Cardiovascular:  heart regular, no gallop, no murmur; mild lower extremity edema; no IV phlebitis  GI:  abdomen soft, NT, ND, normal bowel sounds, no palpable masses or organomegaly  Musculoskeletal:  no clubbing, cyanosis or petechiae; extremities with no gross effusions, joint misalignment or acute arthritis  Skin: warm, dry, normal turgor; no rash, sacral ulcer looks stable -- healing stage 4, small, red, granular, fibrin, some slight hyperkeratosis around it, slight undermining, no signs of infection. ______________________________    Recent Labs     02/18/23  0424 02/17/23  1541   WBC 4.7 6.1   HGB 10.4* 9.6*   HCT 31.1* 29.6*   MCV 88.3 89.6    214     Lab Results   Component Value Date    CREATININE 0.9 02/20/2023     Lab Results   Component Value Date    LABALBU 3.5 02/18/2023     Lab Results   Component Value Date    ALT 10 02/18/2023    AST 14 (L) 02/18/2023    ALKPHOS 79 02/18/2023    BILITOT 0.4 02/18/2023      Lab Results   Component Value Date    LABA1C 8.4 02/18/2023     Other recent pertinent labs: Anion gap 13 to 11. PCT was 0.09. WBC diff normal on admission; lymphopenic after steroids. ______________________________    Recent pertinent micro results:  COVID and Flu negative.  ______________________________    Recent imaging results (last 7 days):     XR CHEST PORTABLE    Result Date: 2/17/2023  1. Linear opacities at the lung bases favored to represent atelectasis or scarring. 2. Borderline cardiomegaly without evidence of CHF. 3. COPD. CT CHEST PULMONARY EMBOLISM W CONTRAST    Result Date: 2/17/2023  1. No evidence of pulmonary embolism or acute pulmonary abnormality. 2.  Multiple bilateral lung cysts which are larger but still thin walled. 3.  Small fixed hiatus hernia 4.   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    Immunocompromised state ([facility] D84.9    Coronary artery disease I25.10    Bilateral lower extremity edema R60.0    Essential hypertension I10    Lumbar spondylosis M47.816    Mitral valve insufficiency and aortic valve insufficiency I08.0    Mixed hyperlipidemia E78.2    Myopia of both eyes H52.13    Osteoporosis M81.0    Other chronic sinusitis J32.8    Primary open angle glaucoma (POAG) of both eyes, mild stage H40.1131    Primary osteoarthritis of right hip M16.11    Type 2 diabetes mellitus with unspecified diabetic retinopathy without macular edema ([facility]) E11.319    Type 2 diabetes mellitus with diabetic peripheral angiopathy without gangrene, with long-term current use of insulin (HCC) E11.51, Z79.4    Pressure ulcer of coccygeal region, stage 4 ([facility]) L89.154    Mild malnutrition ([facility]) E44.1    Chronic diastolic congestive heart failure (HCC) I50.32    COPD exacerbation ([facility]) J44.1    Hypergranulation L92.9    Nonrheumatic mitral (valve) insufficiency I34.0    Chronic respiratory failure with hypoxia ([facility]) J96.11    Venous insufficiency of left lower extremity I87.2    Acute respiratory failure with hypoxia ([facility]) J96.01    Tracheobronchitis J40    Hyperglycemia R73.9    Acute kidney injury ([facility] N17.9     Assessment of today's active condition(s):     -- RA, CAD, chronic lung disease, a number of other chronic medical problems as mentioned. -- Small but very slowly healing stage 4 sacral pressure ulcer, no signs of infection. Might need to more completely excise some overhanging skin superiorly, but I've hesitated to do that, fearing it could take her even longer to heal.     -- COPD / bronchiectasis exacerbation, acute hypox resp failure, seems to be improving. Treatment recs:     I entered some simple wound care orders for each MWF, while she's here (periwound ZnO, alginate, foam). Eduin Chadwick doesn't necessarily need to see her while she's here. Pulmonary disease exacerbation treatment details per pulmonary. Keep up with offloading, protein intake, glucose control (these acutely high glucoses are out of her hands, with the stress of acute illness, and steroid therapy). Next time I see her (week from Friday), will decide on simple ongoing care, vs more aggressive edge excision, vs RAMON NPWT.      Electronically signed by Tristan Pool MD on 2/20/2023 at 6:53 AM.

## 2023-02-20 NOTE — DISCHARGE INSTRUCTIONS
Adjust insulin for high sugars , keep sugars <150  Hold azithromycin while on levaquin           Heart Failure Resources:    Heart Failure Interactive Workbook:   Go to [email or web address]-[email or web address]/aha-heartfailure for a Free Heart Failure Interactive Workbook provided by [employer]. This interactive workbook will provide information on Healthier Living with Heart Failure. Please copy and paste link into search bar. Use your mouse to scroll through the pages. HF Sherman teja:   Heart Failure Free smart phone teja available for iPhone and Android download. Use your phone to track sodium intake, fluid intake, symptoms, and weight. Low Sodium Diet:  Go to www. [employer]. org website for Drifty which is Low Sodium! [employer] is a dialysis company, but this website offers free seasonal cookbooks. Each quarter, they will release 25-30 new recipes with a breakdown of calories, sodium, and glucose. Recipes:   Go to [email or web address] website for free recipes.

## 2023-02-20 NOTE — PROGRESS NOTES
[facility] Progress Note  Date of Service: 03/28/2022  Primary Cardiologist: Will be Dr. Davenport    Angelica Robledo is a 51 year old female without routine medical care and on no medications, who was admitted on 3/27/2022 with chest pain since Thursday of last week.    Subjective: Marked improvement in chest pain from yesterday; brief resolution with morphine last night, but had recurrence overnight and now persistent slight chest tightness.  No associated symptoms.    Assessment:  1. Non-STEMI, troponin peak @ 7200.  2. Prior history of myocardial infarction in her 20s, details unclear.  3. Hypertensive emergency, previously untreated hypertension.  4. LV apical hypokinesis, LAD infarct versus Takotsubo cardiomyopathy.  EF 50-55%.  5. .  6. Mild Anemia, hemoglobin 10.6.  Denies symptoms of bleeding.    Plan:   1. Coronary angiogram with possible PCI this morning.  The risk / benefits were discussed in detail with the patient, who was in agreement to proceed.  2. Continue aspirin, statin, beta-blocker therapy.  Stop heparin on arrival to Cath Lab.  Add lisinopril 2.5 mg daily post-procedure.   3. Hospital service to work-up anemia.    Plan was formulated under direction of Dr. Davenport.   Melania Chung PA-C  [facility]  Pager: [phone]  Text Page  (7:30am - 4pm M-F)   __________________________________________________________________________  Physical Exam   Temp: 98.1  F (36.7  C) Temp src: Oral BP: (!) 151/89 Pulse: 78   Resp: 12 SpO2: 97 % O2 Device: Nasal cannula Oxygen Delivery: 3 LPM  Vitals:    03/27/22 1017 03/27/22 1452 03/28/22 0500   Weight: 98.4 kg (216 lb 14.9 oz) 95.2 kg (209 lb 14.4 oz) 95.7 kg (211 lb)       GENERAL:  The patient is in no apparent distress.   NECK: CVP appears normal, no masses or thyromegaly.  PULMONARY:  There is a normal respiratory effort. Clear lungs to auscultation bilaterally.   CARDIOVASCULAR:  RRR, normal S1  Pt reported headache. Tylenol given per MAR.  Fredy Fernandez RN S2, no m/r/g.  GI:  Non tender abdomen with normoactive bowel sounds and no hepatosplenomegaly. There are no masses palpable.   EXTREMITIES:  No clubbing, cyanosis or edema.  VASCULAR: 2+ Pulses bilaterally in upper and lower extremities.    Medications     - MEDICATION INSTRUCTIONS -       heparin 1,200 Units/hr (03/28/22 0802)     - MEDICATION INSTRUCTIONS -       nitroGLYcerin Stopped (03/28/22 0544)     - MEDICATION INSTRUCTIONS -       - MEDICATION INSTRUCTIONS -       ACE/ARB/ARNI NOT PRESCRIBED       sodium chloride 150 mL/hr at 03/28/22 0804       aspirin  81 mg Oral Daily     atorvastatin  80 mg Oral Daily     carvedilol  6.25 mg Oral BID w/meals     sodium chloride (PF)  3 mL Intracatheter Q8H     sodium chloride (PF)  3 mL Intracatheter Q8H       Data   Most Recent 3 CBC's:Recent Labs   Lab Test 03/28/22  0600   WBC 11.8*   HGB 10.6*   MCV 80        Most Recent 3 BMP's:Recent Labs   Lab Test 03/28/22  0600      POTASSIUM 3.3*   CHLORIDE 107   CO2 24   BUN 5*   CR 0.75   ANIONGAP 7   TOMAS 9.1   *     Most Recent Cholesterol Panel:Recent Labs   Lab Test 03/27/22  1028   CHOL 172   *   HDL 57   TRIG 47     Most Recent Hemoglobin A1c:No lab results found.    A total of 30 minutes was spent face-to-face or coordinating care of Angelica Robledo. Over 50% of our time was spent counseling the patient and/or coordinating care.

## 2023-02-20 NOTE — PROGRESS NOTES
Shift assessment complete. Patient sleeping upon entering the room. Slightly hypotensive but pt states that is normal. Denies pain or discomfort at this time. Took pills whole with water. Call light within reach. Bed in lowest position. No additional needs at this time.  Tarik Cristobal, RN

## 2023-02-20 NOTE — PROGRESS NOTES
Patient stable. Dressing change complete. Wound care to see patient tomorrow.  Report given to Angie Torres

## 2023-02-20 NOTE — PROGRESS NOTES
Pt called out for accucheck 66 . Denies symptoms at this time self monitor notified her of value 77 .  Pt given crackers and OJ will continue to monitor , family at [facility] at this time pt caox3     1545 pt checked on her monitor 59 gave pt apple juice and crackers , sandwich , pt denies symptoms of concern , holding evening insulin at this time will recheck

## 2023-02-20 NOTE — PROGRESS NOTES
This RN performed dressing change per order , cleansed with wound cleanser, patted dry , zinc to perwound and saline moist opitcell AG placed in wound bed covered with Foam dressing, pt tolerated well  . Pt laying on right side at this time.

## 2023-02-21 VITALS
HEIGHT: 68 IN | WEIGHT: 173.8 LBS | RESPIRATION RATE: 16 BRPM | BODY MASS INDEX: 26.34 KG/M2 | SYSTOLIC BLOOD PRESSURE: 113 MMHG | HEART RATE: 85 BPM | DIASTOLIC BLOOD PRESSURE: 76 MMHG | OXYGEN SATURATION: 99 % | TEMPERATURE: 98 F

## 2023-02-21 LAB
ANION GAP SERPL CALCULATED.3IONS-SCNC: 8 MMOL/L (ref 3–16)
BUN BLDV-MCNC: 30 MG/DL (ref 7–20)
CALCIUM SERPL-MCNC: 8 MG/DL (ref 8.3–10.6)
CHLORIDE BLD-SCNC: 103 MMOL/L (ref 99–110)
CO2: 25 MMOL/L (ref 21–32)
CREAT SERPL-MCNC: 1 MG/DL (ref 0.6–1.2)
GFR SERPL CREATININE-BSD FRML MDRD: >60 ML/MIN/{1.73_M2}
GLUCOSE BLD-MCNC: 125 MG/DL (ref 70–99)
GLUCOSE BLD-MCNC: 276 MG/DL (ref 70–99)
GLUCOSE BLD-MCNC: 81 MG/DL (ref 70–99)
GLUCOSE BLD-MCNC: 84 MG/DL (ref 70–99)
PERFORMED ON: ABNORMAL
PERFORMED ON: ABNORMAL
PERFORMED ON: NORMAL
POTASSIUM SERPL-SCNC: 4.2 MMOL/L (ref 3.5–5.1)
SODIUM BLD-SCNC: 136 MMOL/L (ref 136–145)

## 2023-02-21 PROCEDURE — 6370000000 HC RX 637 (ALT 250 FOR IP): Performed by: INTERNAL MEDICINE

## 2023-02-21 PROCEDURE — 2700000000 HC OXYGEN THERAPY PER DAY

## 2023-02-21 PROCEDURE — 36415 COLL VENOUS BLD VENIPUNCTURE: CPT

## 2023-02-21 PROCEDURE — 94640 AIRWAY INHALATION TREATMENT: CPT

## 2023-02-21 PROCEDURE — 80048 BASIC METABOLIC PNL TOTAL CA: CPT

## 2023-02-21 PROCEDURE — 99232 SBSQ HOSP IP/OBS MODERATE 35: CPT | Performed by: INTERNAL MEDICINE

## 2023-02-21 PROCEDURE — 99239 HOSP IP/OBS DSCHRG MGMT >30: CPT | Performed by: INTERNAL MEDICINE

## 2023-02-21 PROCEDURE — 94761 N-INVAS EAR/PLS OXIMETRY MLT: CPT

## 2023-02-21 PROCEDURE — 6360000002 HC RX W HCPCS: Performed by: INTERNAL MEDICINE

## 2023-02-21 RX ORDER — INSULIN GLARGINE 100 [IU]/ML
15 INJECTION, SOLUTION SUBCUTANEOUS 2 TIMES DAILY
Qty: 5 ADJUSTABLE DOSE PRE-FILLED PEN SYRINGE | Refills: 0
Start: 2023-02-21

## 2023-02-21 RX ORDER — LEVOFLOXACIN 500 MG/1
500 TABLET, FILM COATED ORAL DAILY
Qty: 5 TABLET | Refills: 0 | Status: SHIPPED | OUTPATIENT
Start: 2023-02-21 | End: 2023-02-26

## 2023-02-21 RX ORDER — PREDNISONE 10 MG/1
TABLET ORAL
Qty: 20 TABLET | Refills: 0 | Status: SHIPPED | OUTPATIENT
Start: 2023-02-21

## 2023-02-21 RX ADMIN — ENOXAPARIN SODIUM 40 MG: 100 INJECTION SUBCUTANEOUS at 08:26

## 2023-02-21 RX ADMIN — IPRATROPIUM BROMIDE AND ALBUTEROL SULFATE 1 AMPULE: 2.5; .5 SOLUTION RESPIRATORY (INHALATION) at 06:50

## 2023-02-21 RX ADMIN — CETIRIZINE HYDROCHLORIDE 10 MG: 10 TABLET ORAL at 08:26

## 2023-02-21 RX ADMIN — CALCIUM CARBONATE 500 MG: 500 TABLET, CHEWABLE ORAL at 08:25

## 2023-02-21 RX ADMIN — PANTOPRAZOLE SODIUM 40 MG: 40 TABLET, DELAYED RELEASE ORAL at 06:57

## 2023-02-21 RX ADMIN — ROFLUMILAST 500 MCG: 500 TABLET ORAL at 08:26

## 2023-02-21 RX ADMIN — LEVOFLOXACIN 250 MG: 500 TABLET, FILM COATED ORAL at 08:25

## 2023-02-21 RX ADMIN — IPRATROPIUM BROMIDE AND ALBUTEROL SULFATE 1 AMPULE: 2.5; .5 SOLUTION RESPIRATORY (INHALATION) at 11:12

## 2023-02-21 RX ADMIN — GABAPENTIN 300 MG: 300 CAPSULE ORAL at 08:25

## 2023-02-21 RX ADMIN — INSULIN LISPRO 8 UNITS: 100 INJECTION, SOLUTION INTRAVENOUS; SUBCUTANEOUS at 11:44

## 2023-02-21 RX ADMIN — PREDNISONE 40 MG: 20 TABLET ORAL at 08:26

## 2023-02-21 RX ADMIN — INSULIN GLARGINE 20 UNITS: 100 INJECTION, SOLUTION SUBCUTANEOUS at 08:28

## 2023-02-21 RX ADMIN — MORPHINE SULFATE 15 MG: 15 TABLET, FILM COATED, EXTENDED RELEASE ORAL at 08:26

## 2023-02-21 RX ADMIN — CLOPIDOGREL BISULFATE 75 MG: 75 TABLET ORAL at 08:26

## 2023-02-21 RX ADMIN — DULOXETINE HYDROCHLORIDE 60 MG: 60 CAPSULE, DELAYED RELEASE ORAL at 08:26

## 2023-02-21 NOTE — PLAN OF CARE
Problem: Discharge Planning  Goal: Discharge to home or other facility with appropriate resources  2/21/2023 0813 by Chata Bryan RN  Outcome: Progressing  2/21/2023 0211 by Aylin Jones RN  Outcome: Progressing     Problem: Safety - Adult  Goal: Free from fall injury  2/21/2023 0813 by Chata Bryan RN  Outcome: Progressing  2/21/2023 0211 by Aylin Jones RN  Outcome: Progressing     Problem: Pain  Goal: Verbalizes/displays adequate comfort level or baseline comfort level  2/21/2023 0813 by Chata Bryan RN  Outcome: Progressing  2/21/2023 0211 by Aylin Jones RN  Outcome: Progressing  Flowsheets (Taken 2/20/2023 1530 by Chata Bryan RN)  Verbalizes/displays adequate comfort level or baseline comfort level: Assess pain using appropriate pain scale     Problem: Chronic Conditions and Co-morbidities  Goal: Patient's chronic conditions and co-morbidity symptoms are monitored and maintained or improved  2/21/2023 0813 by Chata Bryan RN  Outcome: Progressing  2/21/2023 0211 by Aylin Jones RN  Outcome: Progressing

## 2023-02-21 NOTE — PLAN OF CARE
Problem: Discharge Planning  Goal: Discharge to home or other facility with appropriate resources  Outcome: Progressing     Problem: Safety - Adult  Goal: Free from fall injury  Outcome: Progressing     Problem: Pain  Goal: Verbalizes/displays adequate comfort level or baseline comfort level  Outcome: Progressing  Flowsheets (Taken 2/20/2023 1530 by Jeannie Barragan RN)  Verbalizes/displays adequate comfort level or baseline comfort level: Assess pain using appropriate pain scale     Problem: Chronic Conditions and Co-morbidities  Goal: Patient's chronic conditions and co-morbidity symptoms are monitored and maintained or improved  Outcome: Progressing

## 2023-02-21 NOTE — PROGRESS NOTES
Patient in stable condition. Transferred care to [facility], [address] [place] [address].  Prisca Salcido RN

## 2023-02-21 NOTE — PROGRESS NOTES
Hospitalist Progress Note      PCP: Davide Goff MD    Date of Admission: 2/17/2023    Subjective:     feels better today, BS improved. Remains on o2 with ambulation   Wishes to go home    Medications:  Reviewed    Infusion Medications    dextrose      sodium chloride 5 mL/hr at 02/19/23 1154     Scheduled Medications    levoFLOXacin  250 mg Oral Daily    insulin glargine  20 Units SubCUTAneous BID    clopidogrel  75 mg Oral Daily    calcium carbonate  500 mg Oral Daily    cetirizine  10 mg Oral Daily    Roflumilast  500 mcg Oral Daily    DULoxetine  60 mg Oral BID    gabapentin  300 mg Oral BID    latanoprost  1 drop Both Eyes Nightly    morphine  15 mg Oral BID    pantoprazole  40 mg Oral QAM AC    sodium chloride flush  10 mL IntraVENous 2 times per day    enoxaparin  40 mg SubCUTAneous Daily    predniSONE  40 mg Oral Daily    sacubitril-valsartan  1 tablet Oral BID    insulin lispro  0-16 Units SubCUTAneous TID WC    insulin lispro  0-9 Units SubCUTAneous Nightly    ipratropium-albuterol  1 ampule Inhalation 4x daily     PRN Meds: docusate sodium, glucose, dextrose bolus **OR** dextrose bolus, glucagon (rDNA), dextrose, sodium chloride flush, sodium chloride, ondansetron **OR** ondansetron, polyethylene glycol, acetaminophen **OR** acetaminophen, albuterol      Intake/Output Summary (Last 24 hours) at 2/21/2023 1225  Last data filed at 2/21/2023 9913  Gross per 24 hour   Intake 640 ml   Output 300 ml   Net 340 ml         Physical Exam Performed:    /60   Pulse 85   Temp 98 °F (36.7 °C) (Oral)   Resp 16   Ht 5' 8\" (1.727 m)   Wt 173 lb 12.8 oz (78.8 kg)   SpO2 95%   BMI 26.43 kg/m²         General:elderly female, up in bed  Happy mood today     Awake, alert and oriented.  Appears to be not in any distress  Mucous Membranes:  Pink , anicteric  Neck: No JVD, no carotid bruit, no thyromegaly  Chest:  Clear to auscultation bilaterally, no added sounds  Cardiovascular:  RRR S1S2 heard, no murmurs or gallops  Abdomen:  Soft, undistended, non tender, no organomegaly, BS present  Extremities: trace  + edema to both LE  Distal pulses well felt  Neurological : grossly normal      Labs:   No results for input(s): WBC, HGB, HCT, PLT in the last 72 hours. Recent Labs     02/19/23  0806 02/20/23  0449 02/21/23  0449   * 131* 136   K 4.0 4.3 4.2   CL 91* 98* 103   CO2 23 22 25   BUN 31* 31* 30*   CREATININE 1.1 0.9 1.0   CALCIUM 8.2* 8.3 8.0*       No results for input(s): AST, ALT, BILIDIR, BILITOT, ALKPHOS in the last 72 hours. No results for input(s): INR in the last 72 hours. No results for input(s): Corky Stallion in the last 72 hours. Urinalysis:      Lab Results   Component Value Date/Time    NITRU Negative 02/17/2023 05:21 PM    WBCUA 0-2 02/17/2023 05:21 PM    BACTERIA Rare 02/17/2023 05:21 PM    RBCUA 3-4 02/17/2023 05:21 PM    BLOODU TRACE-INTACT 02/17/2023 05:21 PM    SPECGRAV 1.010 02/17/2023 05:21 PM    GLUCOSEU Negative 02/17/2023 05:21 PM    GLUCOSEU NEGATIVE 03/20/2010 12:52 PM       Radiology:  CT CHEST PULMONARY EMBOLISM W CONTRAST   Final Result   1. No evidence of pulmonary embolism or acute pulmonary abnormality. 2.  Multiple bilateral lung cysts which are larger but still thin walled. 3.  Small fixed hiatus hernia      4. Mild opacity in the lingula and left lower lobe         XR CHEST PORTABLE   Final Result   1. Linear opacities at the lung bases favored to represent atelectasis or   scarring. 2. Borderline cardiomegaly without evidence of CHF. 3. COPD.              IP CONSULT TO HOSPITALIST  IP CONSULT TO PULMONOLOGY  IP CONSULT TO INFECTIOUS DISEASES    Assessment/Plan:    Active Hospital Problems    Diagnosis     Tracheobronchitis [J40]      Priority: Medium    Hyperglycemia [R73.9]      Priority: Medium    Acute kidney injury ([address] [N17.9]      Priority: Medium    Acute respiratory failure with hypoxia ([address] [J96.01]      Priority: Medium    Pneumonia of left lower lobe due to infectious organism [J18.9]     COPD exacerbation ([facility]) [J44.1]     Chronic diastolic congestive heart failure ([facility]) [I50.32]     Immunocompromised state ([facility] [D84.9]     Tracheobronchomalacia [J39.8]     Cylindrical bronchiectasis ([facility] [J47.9]     GERD (gastroesophageal reflux disease) [K21.9]     Coronary artery disease [I25.10]     Type 2 diabetes mellitus with diabetic peripheral angiopathy without gangrene, with long-term current use of insulin ([facility]) [E11.51, Z79.4]     Mixed hyperlipidemia [E78.2]     Rheumatoid arthritis ([facility] [M06.9]     Essential hypertension [I10]          #Acute respiratory failure. #Acute exacerbation of COPD. Patient presents emergency room with shortness of breath. She was placed on 15 L of oxygen for hypoxia. Ruy Diaz She was admitted to the ICU. started steroids, HHN and abx as below . Imaging and cx neg  Improved slowly -She has been weaned down from 4-->2 L    She takes Daliresp and Singulair at home. She has failed Dulera. - will need home o2 at dc  - change abx to levquin at dc       #Hyponatremia likely related to above. Fluid restriction 1500 cc. Corrected sodium is 130 - improved to 131     #History of tracheomalacia. #Diabetes mellitus type 2 with hyperglycemia. Sugars are increased due to steroids. Use insulin. Increased lantus and improved     #CTPA shows lung cyst.  Appear to be chronic. Outpatient follow-up. No acute PE seen. #CAD status post CABG. Denies any chest pain. On Plavix. #Rheumatoid arthritis. On Enbrel and prednisone at home. Hold Enbrel. #Hyperlipidemia on Lipitor. #Chronic combined systolic and diastolic heart failure. Continue Entresto , home diuretics           #Chronic pain syndrome-chronic opioid dependence uncomplicated. On MS Contin. #Lovenox for DVT prophylaxis. Dc to home today        Diet: ADULT DIET;  Regular; 4 carb choices (60 gm/meal); 1500 ml  Code Status: Full Code  PT/OT Eval Status: ordered    Venus Booze care        Aguilar Jewell MD

## 2023-02-21 NOTE — PLAN OF CARE
Problem: Discharge Planning  Goal: Discharge to home or other facility with appropriate resources  2/21/2023 1401 by Kathleen Kapoor RN  Outcome: Completed  2/21/2023 1400 by Kathleen Kapoor RN  Outcome: Progressing  2/21/2023 0813 by Kathleen Kapoor RN  Outcome: Progressing  Flowsheets (Taken 2/21/2023 0813)  Discharge to home or other facility with appropriate resources:   Identify barriers to discharge with patient and caregiver   Arrange for needed discharge resources and transportation as appropriate  2/21/2023 0211 by Ju Carias RN  Outcome: Progressing     Problem: Safety - Adult  Goal: Free from fall injury  2/21/2023 1401 by Kathleen Kapoor RN  Outcome: Completed  2/21/2023 1400 by Kathleen Kapoor RN  Outcome: Progressing  2/21/2023 0813 by Kathleen Kapoor RN  Outcome: Progressing  2/21/2023 0211 by Ju Carias RN  Outcome: Progressing     Problem: Pain  Goal: Verbalizes/displays adequate comfort level or baseline comfort level  2/21/2023 1401 by Kathleen Kapoor RN  Outcome: Completed  2/21/2023 1400 by Kathleen Kapoor RN  Outcome: Progressing  Flowsheets (Taken 2/21/2023 1345)  Verbalizes/displays adequate comfort level or baseline comfort level: Encourage patient to monitor pain and request assistance  2/21/2023 0813 by Kathleen Kapoor RN  Outcome: Progressing  Flowsheets (Taken 2/21/2023 0813)  Verbalizes/displays adequate comfort level or baseline comfort level:   Encourage patient to monitor pain and request assistance   Assess pain using appropriate pain scale  2/21/2023 0211 by Ju Carias RN  Outcome: Progressing  Flowsheets (Taken 2/20/2023 1530 by Kathleen Kapoor RN)  Verbalizes/displays adequate comfort level or baseline comfort level: Assess pain using appropriate pain scale     Problem: Cardiovascular - Adult  Goal: Maintains optimal cardiac output and hemodynamic stability  2/21/2023 1401 by Kathleen Kapoor RN  Outcome: Completed  2/21/2023 1400 by Kathleen Kapoor RN  Outcome: Progressing  Goal: Absence of cardiac dysrhythmias or at baseline  2/21/2023 1401 by Tarki Kyle RN  Outcome: Completed  2/21/2023 1400 by Tarik Kyle RN  Outcome: Progressing

## 2023-02-21 NOTE — DISCHARGE INSTR - COC
Continuity of Care Form    Patient Name: Petey Rater   :  1950  MRN:  [id number]    Admit date:  2023  Discharge date:  ***    Code Status Order: Full Code   Advance Directives:   Advance Care Flowsheet Documentation       Date/Time Healthcare Directive Type of Healthcare Directive Copy in [address] Agent's Name Healthcare Agent's Phone Number    23 0010 -- Living will Yes, copy in chart -- -- --            Admitting Physician:  Jordan Gorman MD  PCP: Shonda De Leon MD    Discharging Nurse: [facility] Unit/Room#: /5600-86  Discharging Unit Phone Number: ***    Emergency Contact:   Extended Emergency Contact Information  Primary Emergency Contact: Erlindamolly Mclaughlin [address] Phone: [phone]  Work Phone: [phone]  Mobile Phone: [phone]  Relation: Child   needed? No  Secondary Emergency Contact:  [address] Phone: [phone]  Work Phone: [phone]  Mobile Phone: [phone]  Relation: Child   needed?  No    Past Surgical History:  Past Surgical History:   Procedure Laterality Date    ARTERY BIOPSY Right 2021    at [address]  2013    left temporal artery biopsy    BACK SURGERY  2020    BRONCHOSCOPY      BRONCHOSCOPY N/A 2019    BRONCHOSCOPY ALVEOLAR LAVAGE performed by Jayson Finnegan MD at [address]  2021    CATARACT REMOVAL      COLONOSCOPY      CORONARY ARTERY BYPASS GRAFT N/A 2021    CORONARY ARTERY BYPASS X3 WITH LEFT ATRIAL APPENDAGE CLIP, 5 LEVEL BILATERAL INTERCOSTAL NERVE BLOCK, STERNAL PLATING performed by Keeley Christy MD at [address] CATH  2021    IR MIDLINE CATH 2021 C/[facility] Final ARTHROSCOPY      left    KYPHOSIS SURGERY      LAMINECTOMY LEG SURGERY Left 7/13/2021    INCISION AND DEBRIDEMENT LEFT LOWER LEG WOUND WITH POSSIBLE WOUND VAC PLACEMENT performed by Christine Fay MD at [address] [facility]  02/2020    MEDIASTINOSCOPY N/A 05/05/2021    MEDIASTINAL EXPLORATION AND EVACUATION OF HEMATOMA performed by Elena Coto MD at [place] [address]  01/08/2021    sacral wound debridement    PRESSURE ULCER DEBRIDEMENT N/A 01/08/2021    SACRAL WOUND DEBRIDEMENT performed by Dayana Krause MD at [address] [place] [address]  05/07/2013    FESS with balloon    SPINAL FUSION      TRANSESOPHAGEAL ECHOCARDIOGRAM  11/02/2021    TUBAL LIGATION      UPPER GASTROINTESTINAL ENDOSCOPY  04/08/2014    Dilitation       Immunization History:   Immunization History   Administered Date(s) Administered    COVID-19, PFIZER PURPLE top, DILUTE for use, (age 15 y+), 30mcg/0.3mL 02/11/2021, 03/04/2021    Influenza Virus Vaccine 10/03/2019    Influenza, FLUAD, (age 72 y+), Adjuvanted, 0.5mL 11/01/2022    Influenza, High Dose (Fluzone 65 yrs and older) 11/22/2017, 11/08/2018, 09/21/2020    PPD Test 12/02/2014    Pneumococcal Conjugate 13-valent (Mcemliw03) 11/26/2013    Pneumococcal Conjugate 7-valent (Prevnar7) 11/26/2013    Pneumococcal Polysaccharide (Yripkaxuy88) 10/23/2017    Td Vaccine >8yo [facility] 01/01/2007       Active Problems:  Patient Active Problem List   Diagnosis Code    Rheumatoid arthritis ([facility] M06.9    Psoriasis L40.9    GERD (gastroesophageal reflux disease) K21.9    Anemia D64.9    Cylindrical bronchiectasis (HCC) J47.9    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    Immunocompromised state ([facility] D84.9    Coronary artery disease I25.10    Bilateral lower extremity edema R60.0    Essential hypertension I10    Lumbar spondylosis M47.816    Mitral valve insufficiency and aortic valve insufficiency I08.0    Mixed hyperlipidemia E78.2    Myopia of both eyes H52.13    Osteoporosis M81.0    Other chronic sinusitis J32.8    Primary open angle glaucoma (POAG) of both eyes, mild stage H40.1131    Primary osteoarthritis of right hip M16.11    Type 2 diabetes mellitus with unspecified diabetic retinopathy without macular edema ([facility]) E11.319    Type 2 diabetes mellitus with diabetic peripheral angiopathy without gangrene, with long-term current use of insulin ([facility]) E11.51, Z79.4    Pressure ulcer of coccygeal region, stage 4 ([facility]) L89.154    Mild malnutrition ([facility]) E44.1    Chronic diastolic congestive heart failure ([facility]) I50.32    COPD exacerbation ([facility]) J44.1    Hypergranulation L92.9    Nonrheumatic mitral (valve) insufficiency I34.0    Chronic respiratory failure with hypoxia ([facility]) J96.11    Pneumonia of left lower lobe due to infectious organism J18.9    Venous insufficiency of left lower extremity I87.2    Acute respiratory failure with hypoxia ([facility]) J96.01    Tracheobronchitis J40    Hyperglycemia R73.9    Acute kidney injury ([facility] [address] N17.9       Isolation/Infection:   Isolation            Contact          Patient Infection Status       Infection Onset Added Last Indicated Last Indicated By Review Planned Expiration Resolved Resolved By    MRSA 21 Culture, Respiratory        Resolved    COVID-19 (Rule Out) 23 COVID-19 & Influenza Combo (Ordered)   23 Rule-Out Test Resulted    COVID-19 (Rule Out) 22 COVID-19 & Influenza Combo (Ordered)   22 Rule-Out Test Resulted    COVID-19 (Rule Out) 22 COVID-19 & Influenza Combo (Ordered)   22 Rule-Out Test Resulted    COVID-19 21 COVID-19 & Influenza Combo   01/10/22     COVID-19 (Rule Out) 21 COVID-19 & Influenza Combo (Ordered)   21 Rule-Out Test Resulted    COVID-19 (Rule Out) 10/27/21 10/27/21 10/27/21 COVID-19 (Ordered)   10/28/21 Rule-Out Test Resulted    COVID-19 (Rule Out) 08/10/21 08/10/21 08/10/21 COVID-19, Rapid (Ordered)   08/10/21 Rule-Out Test Resulted    COVID-19 (Rule Out) 04/23/21 04/23/21 04/23/21 COVID-19, Rapid (Ordered)   04/23/21 Rule-Out Test Resulted    COVID-19 (Rule Out) 01/04/21 01/04/21 01/04/21 COVID-19 (Ordered)   01/05/21 Rule-Out Test Resulted    COVID-19 (Rule Out) 06/28/20 06/28/20 06/28/20 COVID-19 (Ordered)   06/29/20 Rule-Out Test Resulted            Nurse Assessment:  Last Vital Signs: /76   Pulse 85   Temp 98 °F (36.7 °C) (Oral)   Resp 16   Ht 5' 8\" (1.727 m)   Wt 173 lb 12.8 oz (78.8 kg)   SpO2 99%   BMI 26.43 kg/m²     Last documented pain score (0-10 scale): Pain Level: 10  Last Weight:   Wt Readings from Last 1 Encounters:   02/21/23 173 lb 12.8 oz (78.8 kg)     Mental Status:  alert    IV Access:  - None    Nursing Mobility/ADLs:  Walking   Assisted  Transfer  Assisted  Bathing  Assisted  Dressing  Assisted  Toileting  Assisted  Feeding  Independent  Med Admin  Assisted  Med Delivery   none    Wound Care Documentation and Therapy:  Wound 12/18/20 #2, Sacrum, Pressure Injury, Stage 4, Onset 5/2020 (Active)   Wound Image   02/17/23 1002   Wound Etiology Pressure Stage 4 02/21/23 0813   Dressing Status Clean;Dry; Intact 02/21/23 0813   Wound Cleansed Soap and water 02/17/23 1002   Dressing/Treatment Other (comment) 02/21/23 0813   Wound Length (cm) 0.6 cm 02/17/23 1002   Wound Width (cm) 0.8 cm 02/17/23 1002   Wound Depth (cm) 0.5 cm 02/17/23 1002   Wound Surface Area (cm^2) 0.48 cm^2 02/17/23 1002   Change in Wound Size % (l*w) -220 02/17/23 1002   Wound Volume (cm^3) 0.24 cm^3 02/17/23 1002   Wound Healing % -167 02/17/23 1002   Post-Procedure Length (cm) 0.6 cm 02/17/23 1022   Post-Procedure Width (cm) 0.8 cm 02/17/23 1022   Post-Procedure Depth (cm) 0.5 cm 02/17/23 1022   Post-Procedure Surface Area (cm^2) 0.48 cm^2 02/17/23 1022   Post-Procedure Volume (cm^3) 0.24 cm^3 02/17/23 1022   Distance Tunneling (cm) 0 cm 02/17/23 1002   Tunneling Position ___ O'Clock 9 02/03/23 1004   Undermining Starts ___ O'Clock 10 02/17/23 1022   Undermining Ends___ O'Clock 2 02/17/23 1022   Undermining Maxium Distance (cm) 0.7 02/17/23 1022   Wound Assessment Pink/red 02/17/23 1002   Drainage Amount Scant 02/17/23 1002   Drainage Description Serous 02/17/23 1002   Odor None 02/17/23 1002   Maegan-wound Assessment Maceration 02/17/23 1002   Number of days: 795       Wound 02/18/23 Coccyx (Active)   Wound Image   02/18/23 0217   Dressing Status Clean;Dry; Intact 02/21/23 0813   Wound Cleansed Wound cleanser 02/20/23 1016   Dressing/Treatment Alginate with Ag;Foam;Zinc paste 02/20/23 1016   Drainage Amount None 02/20/23 1016   Odor None 02/20/23 1016   Number of days: 3      Dressing changes MWF , cleanse wound, alginate with AG to wound bed moistened ,zinc to maegan wound and cover with foam   Elimination:  Continence: Bowel: Yes  Bladder: Yes  Urinary Catheter: None   Colostomy/Ileostomy/Ileal Conduit: No       Date of Last BM: 02/20/2023      Intake/Output Summary (Last 24 hours) at 2/21/2023 1412  Last data filed at 2/21/2023 0709  Gross per 24 hour   Intake 420 ml   Output 300 ml   Net 120 ml     I/O last 3 completed shifts: In: 700 [P.O.:700]  Out: 1250 [Urine:1250]    Safety Concerns: At Risk for Falls    Impairments/Disabilities:      None    Nutrition Therapy:  Current Nutrition Therapy:   - Oral Diet:  General    Routes of Feeding: Oral  Liquids: No Restrictions  Daily Fluid Restriction: yes - amount 1500ml  Last Modified Barium Swallow with Video (Video Swallowing Test): not done    Treatments at the Time of Hospital Discharge:   Respiratory Treatments:   Oxygen Therapy:  is on oxygen at 2 L/min per nasal cannula.   Ventilator:    - No ventilator support    Rehab Therapies: Physical Therapy and Occupational Therapy  Weight Bearing Status/Restrictions: No weight bearing restrictions  Other Medical Equipment (for information only, NOT a DME order):  walker  Other Treatments: Patient's personal belongings (please select all that are sent with patient):       RN SIGNATURE:  Electronically signed by Sami Booker RN on 2/21/23 at 2:14 PM EST    CASE MANAGEMENT/SOCIAL WORK SECTION    Inpatient Status Date: 02/17/2023    Readmission Risk Assessment Score:  Readmission Risk              Risk of Unplanned Readmission:  24           Discharging to Facility/ Agency   Name: [facility]   Address:  Phone: [phone]  Fax:    / signature: Electronically signed by DAVID Diaz, FRANCISCO JAVIERW-S on 2/21/23 at 2:26 PM EST    PHYSICIAN SECTION    Prognosis: {Prognosis:3720180375}    Condition at Discharge: [address] Patient Condition:[id number]}    Rehab Potential (if transferring to Rehab): {Prognosis:2307567143}    Recommended Labs or Other Treatments After Discharge: ***    Physician Certification: I certify the above information and transfer of Alric Fennel  is necessary for the continuing treatment of the diagnosis listed and that she requires Home Care for less 30 days.      Update Admission H&P: Changes in H&P as follows - see attached    PHYSICIAN SIGNATURE: T/O Dr. Shena Slaughter MD/ Electronically signed by Griffin Salas RN on 2/21/23 at 2:29 PM EST

## 2023-02-21 NOTE — FLOWSHEET NOTE
02/21/23 0813   Vital Signs   Temp 98 °F (36.7 °C)   Temp Source Oral   Heart Rate 85   Heart Rate Source Monitor   Resp 16   /60   MAP (Calculated) 73   BP Location Left Arm   BP Method Automatic   Patient Position Right side   Level of Consciousness 0   MEWS Score 2   Pain Assessment   Pain Assessment None - Denies Pain   Care Plan - Pain Goals   Verbalizes/displays adequate comfort level or baseline comfort level Encourage patient to monitor pain and request assistance;Assess pain using appropriate pain scale   Oxygen Therapy   SpO2 95 %   O2 Device Nasal cannula   O2 Flow Rate (L/min) 1 L/min   See flowsheet for full assessment , call light within reach , and bed in low position for pt safety pt caox3 , denies needs at this time

## 2023-02-21 NOTE — PLAN OF CARE
HEART FAILURE CARE PLAN:    Comorbidities Reviewed: Yes   Patient has a past medical history of Acute on chronic diastolic heart failure due to coronary artery disease ([address] Acute respiratory failure with hypoxia ([address] Atherosclerosis of native artery of right lower extremity with rest pain ([address] Back pain, Branch retinal vein occlusion, Bronchiectasis with acute exacerbation ([facility]), Cellulitis and abscess of left leg, Cellulitis of left lower extremity, CHF (congestive heart failure) ([facility]), Closed compression fracture of thoracic vertebra ([facility]), Closed fracture of facial bone with routine healing, Closed jaw fracture ([address] Community acquired pneumonia of left lower lobe of lung, Compression fracture of L1 lumbar vertebra ([facility]), COPD (chronic obstructive pulmonary disease) ([address] COVID, Fracture of tibial plateau, closed, left, initial encounter, HCAP (healthcare-associated pneumonia), Minimally displaced zone I fracture of sacrum ([facility]), MRSA (methicillin resistant staph aureus) culture positive, MRSA (methicillin resistant Staphylococcus aureus), Mucus plugging of bronchi, NSTEMI (non-ST elevated myocardial infarction) ([address] Osteomyelitis of mandible, Osteoporosis with pathological fracture, Post herpetic neuralgia, Pressure ulcer of left heel, stage 3 ([facility]), Proximal humerus fracture, Rheumatoid arthritis ([address] Shingles, Sleep apnea, Status post incision and drainage, Surgical wound dehiscence, initial encounter (at LLE SVG harvest site), Temporal arteritis ([address] Tobacco use, Tracheomalacia, and Vitreous hemorrhage, right eye ([address] ECHOCARDIOGRAM Reviewed: Yes   Patient's Ejection Fraction (EF) is greater than 40%    Weights Reviewed:  Yes   Admission weight: 172 lb (78 kg)   Wt Readings from Last 3 Encounters:   02/21/23 173 lb 12.8 oz (78.8 kg)   02/17/23 171 lb 3.2 oz (77.7 kg)   02/03/23 175 lb 12.8 oz (79.7 kg)     Intake & Output Reviewed: Yes     Intake/Output Summary (Last 24 hours) at 2/21/2023 [address] filed at 2/21/2023 0709  Gross per 24 hour   Intake 420 ml   Output 300 ml   Net 120 ml     Medications Reviewed: Yes   SCHEDULED HOSPITAL MEDICATIONS:   levoFLOXacin  250 mg Oral Daily    insulin glargine  20 Units SubCUTAneous BID    clopidogrel  75 mg Oral Daily    calcium carbonate  500 mg Oral Daily    cetirizine  10 mg Oral Daily    Roflumilast  500 mcg Oral Daily    DULoxetine  60 mg Oral BID    gabapentin  300 mg Oral BID    latanoprost  1 drop Both Eyes Nightly    morphine  15 mg Oral BID    pantoprazole  40 mg Oral QAM AC    sodium chloride flush  10 mL IntraVENous 2 times per day    enoxaparin  40 mg SubCUTAneous Daily    predniSONE  40 mg Oral Daily    sacubitril-valsartan  1 tablet Oral BID    insulin lispro  0-16 Units SubCUTAneous TID WC    insulin lispro  0-9 Units SubCUTAneous Nightly    ipratropium-albuterol  1 ampule Inhalation 4x daily     ACE/ARB/ARNI is REQUIRED for EF </= 91% SYSTOLIC FAILURE:   ACE[de-identified] None  ARB[de-identified] N/A  ARNI[de-identified] Sacubitril/Valsartan-Entresto    Evidenced-Based Beta Blocker is REQUIRED for EF </= 79% SYSTOLIC FAILURE:   [de-identified] None    Diuretics:  [de-identified] Furosemide    Diet Reviewed: Yes   ADULT DIET; Regular; 4 carb choices (60 gm/meal); 1500 ml    Goal of Care Reviewed: Yes   Patient and/or Family's stated Goal of Care this Admission: reduce shortness of breath, increase activity tolerance, better understand heart failure and disease management, be more comfortable, reduce lower extremity edema, and unable to assess, will attempt again prior to discharge.

## 2023-02-21 NOTE — PROGRESS NOTES
Shift assessment complete. VSS. Patient a&o x4. Denies pain or discomfort. Pt resting comfortably in bed watching television. Took medications whole with water with no issues. Call light within reach. Bed in lowest position. No additional needs at this time.  Danna Mcqueen RN

## 2023-02-21 NOTE — PROGRESS NOTES
Pulmonary Progress Note    CC: COPD    Subjective:   She states she feels great   Down to 1  L O2-no home O2  No sputum production  Still with wheezes but lesss  Has about 20 PPY smoking         Intake/Output Summary (Last 24 hours) at 2/21/2023 0901  Last data filed at 2/21/2023 0709  Gross per 24 hour   Intake 940 ml   Output 600 ml   Net 340 ml         Exam:   /60   Pulse 85   Temp 98 °F (36.7 °C) (Oral)   Resp 16   Ht 5' 8\" (1.727 m)   Wt 181 lb 9.6 oz (82.4 kg)   SpO2 95%   BMI 27.61 kg/m²  on 2 L  Gen: No distress. Eyes: PERRL. No sclera icterus. No conjunctival injection. ENT: No discharge. Pharynx clear. Neck: Trachea midline. No obvious mass. Resp: + accessory muscle use. Occasional wheezes. Few rhonchi. No dullness on percussion. CV: Regular rate. Regular rhythm. No murmur or rub. No edema. GI: Non-tender. Non-distended. No hernia. Skin: Warm and dry. No nodule on exposed extremities. Lymph: No cervical LAD. No supraclavicular LAD. M/S: No cyanosis. + joint deformity. No clubbing. Neuro: Awake. Alert. Moves all four extremities. Psych: Oriented x 3.  No anxiety    Scheduled Meds:   levoFLOXacin  250 mg Oral Daily    insulin glargine  20 Units SubCUTAneous BID    insulin lispro  10 Units SubCUTAneous TID     clopidogrel  75 mg Oral Daily    calcium carbonate  500 mg Oral Daily    cetirizine  10 mg Oral Daily    Roflumilast  500 mcg Oral Daily    DULoxetine  60 mg Oral BID    gabapentin  300 mg Oral BID    latanoprost  1 drop Both Eyes Nightly    morphine  15 mg Oral BID    pantoprazole  40 mg Oral QAM AC    sodium chloride flush  10 mL IntraVENous 2 times per day    enoxaparin  40 mg SubCUTAneous Daily    predniSONE  40 mg Oral Daily    sacubitril-valsartan  1 tablet Oral BID    insulin lispro  0-16 Units SubCUTAneous TID     insulin lispro  0-9 Units SubCUTAneous Nightly    ipratropium-albuterol  1 ampule Inhalation 4x daily     Continuous Infusions:   dextrose sodium chloride 5 mL/hr at 02/19/23 1154     PRN Meds:  docusate sodium, glucose, dextrose bolus **OR** dextrose bolus, glucagon (rDNA), dextrose, sodium chloride flush, sodium chloride, ondansetron **OR** ondansetron, polyethylene glycol, acetaminophen **OR** acetaminophen, albuterol    Labs:  CBC:   No results for input(s): WBC, HGB, HCT, MCV, PLT in the last 72 hours. BMP:   Recent Labs     02/19/23  0806 02/20/23  0449 02/21/23  0449   * 131* 136   K 4.0 4.3 4.2   CL 91* 98* 103   CO2 23 22 25   BUN 31* 31* 30*   CREATININE 1.1 0.9 1.0       LIVER PROFILE:   No results for input(s): AST, ALT, LIPASE, BILIDIR, BILITOT, ALKPHOS in the last 72 hours. Invalid input(s): AMYLASE,  ALB    PT/INR: No results for input(s): PROTIME, INR in the last 72 hours. APTT: No results for input(s): APTT in the last 72 hours. UA:  No results for input(s): NITRITE, COLORU, PHUR, LABCAST, WBCUA, RBCUA, MUCUS, TRICHOMONAS, YEAST, BACTERIA, CLARITYU, SPECGRAV, LEUKOCYTESUR, UROBILINOGEN, BILIRUBINUR, BLOODU, GLUCOSEU, AMORPHOUS in the last 72 hours. Invalid input(s): KETONESU    No results for input(s): PHART, VWH2CEZ, PO2ART in the last 72 hours. Cultures:   2/17 COVID-19 not detected        CT chest 2/17 imaging was reviewed by me and showed   1. No evidence of pulmonary embolism or acute pulmonary abnormality. 2.  Multiple bilateral lung cysts which are larger but still thin walled. 3.  Small fixed hiatus hernia   4.   Mild opacity in the lingula and left lower lobe            ASSESSMENT:  Acute hypoxemic respiratory failure  Cysytic lung disease   COPD with acute exacerbation  Tracheobronchitis   Acute kidney injury  Hyperglycemia  Chronic systolic CHF EF 30 to 07%  Chronic pain on home MS Contin  Rheumatoid arthritis on Enbrel and prednisone 4 mg/day  followed by rheumatology     PLAN:  CT scan showing Cystic lesion ,they been present when review CT 2019 ,  I doubt PINEDA or histocytosis ,however follow up as OP needed  Supplemental oxygen to maintain SaO2 >92%; wean as tolerated  Intensive inhaled bronchodilator therapy   switch to oral prednisone tape rover 10 days   Levaquin day #4  Acapella and Mucinex/pulmonary toilet   DVT prophylaxis: Lovenox  Possible discharge today   Need to go on O2

## 2023-02-21 NOTE — PROGRESS NOTES
Patient educated on discharge instructions as well as new medications use, dosage, administration and possible side effects. Patient verified knowledge. IV removed without difficulty and dry dressing in place. Telemetry monitor removed and returned to [facility]. Pt left facility in stable condition to Home with all of their personal belongings.

## 2023-02-21 NOTE — PROGRESS NOTES
RT Inhaler-Nebulizer Bronchodilator Protocol Note    There is a bronchodilator order in the chart from a provider indicating to follow the RT Bronchodilator Protocol and there is an Initiate RT Inhaler-Nebulizer Bronchodilator Protocol order as well (see protocol at bottom of note). CXR Findings:  No results found. The findings from the last RT Protocol Assessment were as follows:   History Pulmonary Disease: (P) Chronic pulmonary disease  Respiratory Pattern: (P) Regular pattern and RR 12-20 bpm  Breath Sounds: (P) Slightly diminished and/or crackles  Cough: (P) Strong, spontaneous, non-productive  Indication for Bronchodilator Therapy: (P) Decreased or absent breath sounds  Bronchodilator Assessment Score: (P) 4    Aerosolized bronchodilator medication orders have been revised according to the RT Inhaler-Nebulizer Bronchodilator Protocol below. Respiratory Therapist to perform RT Therapy Protocol Assessment initially then follow the protocol. Repeat RT Therapy Protocol Assessment PRN for score 0-3 or on second treatment, BID, and PRN for scores above 3. No Indications - adjust the frequency to every 6 hours PRN wheezing or bronchospasm, if no treatments needed after 48 hours then discontinue using Per Protocol order mode. If indication present, adjust the RT bronchodilator orders based on the Bronchodilator Assessment Score as indicated below. Use Inhaler orders unless patient has one or more of the following: on home nebulizer, not able to hold breath for 10 seconds, is not alert and oriented, cannot activate and use MDI correctly, or respiratory rate 25 breaths per minute or more, then use the equivalent nebulizer order(s) with same Frequency and PRN reasons based on the score. If a patient is on this medication at home then do not decrease Frequency below that used at home.     0-3 - enter or revise RT bronchodilator order(s) to equivalent RT Bronchodilator order with Frequency of every 4 hours PRN for wheezing or increased work of breathing using Per Protocol order mode. 4-6 - enter or revise RT Bronchodilator order(s) to two equivalent RT bronchodilator orders with one order with BID Frequency and one order with Frequency of every 4 hours PRN wheezing or increased work of breathing using Per Protocol order mode. 7-10 - enter or revise RT Bronchodilator order(s) to two equivalent RT bronchodilator orders with one order with TID Frequency and one order with Frequency of every 4 hours PRN wheezing or increased work of breathing using Per Protocol order mode. 11-13 - enter or revise RT Bronchodilator order(s) to one equivalent RT bronchodilator order with QID Frequency and an Albuterol order with Frequency of every 4 hours PRN wheezing or increased work of breathing using Per Protocol order mode. Greater than 13 - enter or revise RT Bronchodilator order(s) to one equivalent RT bronchodilator order with every 4 hours Frequency and an Albuterol order with Frequency of every 2 hours PRN wheezing or increased work of breathing using Per Protocol order mode.        Electronically signed by Shirley Cleveland RCP on 2/20/2023 at 9:19 PM

## 2023-02-21 NOTE — CARE COORDINATION
CM delivered 2nd IMM and provided verbal explanation at bedside. Pt voiced understanding of discharge MCR rights and is agreeable to discharge within 4 hours of delivery of the IMM notice.      Lawrence HERNANDEZ, ELIER  Case Management Department  Phone: [phone] Fax: [phone]

## 2023-02-21 NOTE — PROGRESS NOTES
Patient 91 at rest on RA. [id number]  on 2 L at rest.  Patient 87 on RA ambulating  Patient 92 at  2L ambulating.

## 2023-02-21 NOTE — CARE COORDINATION
DISCHARGE ORDER  Date/Time 2023 2:15 PM  Completed by: ELIER Ferguson  Case Management Department  Phone: [phone] Fax: [phone]   Case Management    Patient Name: Lizabeth Decker      : 1950  Admitting Diagnosis: Acute respiratory failure with hypoxia ([address] [J96.01]  Pneumonia of left lower lobe due to infectious organism [J18.9]      Admit order Date and Status:2023  (verify MD's last order for status of admission)      Noted discharge order. If applicable PT/OT recommendation at Discharge: n/a  DME recommendation by PT/OT:n/a    Confirmed discharge plan: Yes  with whom pt  If pt confirmed DC plan does family need to be contacted by CM No if yes who n/a    Discharge Plan:     Date of Last IMM Given: today     Reviewed chart. Role of discharge planner explained and patient verbalized understanding. Discharge order is noted. Met with pt at bedside. Pt stated she will return home today and will have a ride home. [facility] as she has had them in the past (DME list given). She wants to resume services with [facility]. Verified address/phone number. Called and spoke with Jenn barnard [facility] ([phone]) who then transferred CM to [facility]. Referral given to [facility] for home o2 and [facility] states pt's home o2 has been set up. [facility] states to provide pt with a portable tank and a POC and they will deliver everything else to pt's house tomorrow; pt doesn't need to call on discharge today. [facility] states they will call pt for billing, information and will pull the information from epic. Spoke with Candace Severs, [facility] who confirmed pt is active with them and will do resumption of care for pt tomorrow  as pt is normally seen [facility] for wound care. Candace Severs states she will pull the information from epic and CM doesn't need to fax anything.      Provided pt with a portable tank and POC from [employer] with explanation of POC provided; pt stated understanding. Pt directed to call Nicole and carrol by tomorrow if no call received and she stated understanding. She denied all other needs. Kareen Moy RN updated     Pt is being d/c'd to home today. Pt's O2 sats are 92% on 2 liters ambulating per RN note. Discharge timeout done with [facility].. All discharge needs and concerns addressed.

## 2023-02-23 ENCOUNTER — FOLLOWUP TELEPHONE ENCOUNTER (OUTPATIENT)
Dept: ADMINISTRATIVE | Age: 73
End: 2023-02-23

## 2023-02-23 NOTE — TELEPHONE ENCOUNTER
Heart Failure Follow-Up Call:    Call within 72 Hours of Discharge: Yes     Patient: Melania Smith   Patient : 1950   MRN: [id number]    Date of discharge: 23    Discharge department/facility: [facility] / [facility]    Discharge Disposition: Home with [facility]    RARS: Readmission Risk Score: 13.2    Spoke with: Katherine Rea  Patient doing well since being home. 98-99% on 2/L. Patient stated she is weighing her self daily. Reinforced the importance of daily weights and to report weight gain of 3 lbs in one day and 5 lbs in one week to Provider. [facility] is visiting patient on Monday and . Follows up with Dr. Vasile Gentile on . Patient stated she needed to establish appointment with Cardiology. Called office for patient to schedule appointment. Appointment set up with Karolyn Busby CNP on - Thursday 10 am.   Reminder of follow-up PCP appointment on 23 at 11 am. Provided Heart Failure nurse number for any further questions/assistance.      Follow Up  Future Appointments   Date Time Provider Vipul Hester   3/3/2023 10:00 AM Laura Kirby MD [address] [facility]   3/17/2023 10:00 AM Laura Kirby MD [address] [facility]   3/31/2023 10:00 AM Laura Kirby MD [address] [facility]   2023 10:00 AM Laura Kirby MD [address] [facility]   2023 10:00 AM Laura Kirby MD [address] [facility]   2023 10:00 AM Laura Kirby MD [address] [facility]   2023 10:15 [facility]       Electronically signed by Divine Rios, MSN, RN  on 2023 at 1:07 PM

## 2023-02-27 NOTE — DISCHARGE INSTRUCTIONS
215 [facility] Physician Orders and Discharge [address], [address]  [place], [facility]  Telephone: [phone]      Fax: [phone]        Your home care company:   [facility]         Your wound-care supplies will be provided by: [employer] orders supplies through [facility]. Please note, depending on your insurance coverage, you may have out-of-pocket expenses for these supplies. Someone from [place] may call you to confirm your order and discuss those potential costs before they ship your products -- please anticipate that call. If your out-of-pocket cost is going to be less than $200, and [facility] cannot reach you, they may ship your supplies as soon as the order is processed, and will send you a bill. If your out-of-pocket cost is going to be more than $200, [facility] should not ship your supplies until they speak with you. If you have any questions about your supplies or your potential out-of-pocket costs, or if you need to place an order for a refill of supplies (typically monthly), Axel Boyer is your local representative, and can be reached at [phone]. Information on [facility]'s return policy will also be enclosed with your supplies -- please read that carefully before opening your items. NAME:  Keyla Taveras   YOB: 1950  PRIMARY DIAGNOSIS FOR WOUND CARE CENTER:  Pressure ulcer     Wound cleansing:  Do not scrub or use excessive force. Wash hands with soap and water before and after dressing changes. Prior to applying a clean dressing, cleanse wound with normal saline, wound cleanser, or mild soap and water. Ask your physician or nurse before getting the wound(s) wet in the shower.                 Wound care for home:      Sacral Wound:  Betadine to maegan wound   Triad or Zinc Oxide to maegan-wound  Multidex Powder then Collagen with silver to wound bed & tuck into depth of wound and into undermining   Fluff gauze over wound  Gauze bolster using rosendo at the marked area - if you are able to tolerate the entire roll, great, if not you may cut and use less. (Bolster supplies provided today)  Cover with small sacral mepilex border  [facility] to change dressing on Monday, Wednesday, Friday next week        Please note, all wounds (unless stated otherwise here) were mechanically debrided at the time of cleansing here in the wound-care center today, so a small amount of pain, drainage or bleeding from that process might be expected, and is normal.     All products for home use, including multiple products for a single wound if applicable, are medically necessary in order to achieve the best chance at timely wound healing. See provider documentation for details if needed. Substituted dressings applied in the [address] [place] [address] [address] today, if applicable:           New orders for this week (labs, imaging, medications, etc.):        Additional instructions for specific diagnoses:     Possible RAMON discussed for next visit      +ROHO cushion- use at [address] when sitting!!  +True Balance Air group II mattress      General comments for pressure ulcers: *  Make sure you stay well-hydrated, and maintain good protein intake. *  Reposition at least every two hours, to keep from having pressure in one spot for too long. *  If you are not sure whether you have the best offloading surfaces (mattress, mattress overlay, chair cushion, heel-protector boots, etc), please ask. *  Moisturize your skin regularly with Vaseline, Aquaphor, Aveeno, CeraVe, Cetaphil, Eucerin, Lubriderm, etc; but keep the skin between your toes dry. *  If you smoke, your wound can not heal properly -- please talk with us when you're ready to quit.         F/U Appointment is with Dr. Zac Ramsey in 1 week on                                              at                       .     Your nurse  is Wayne Herron,     If we applied slip-resistant hospital socks today, be sure to remove them at least once a day to inspect your toes or feet, even if you're not changing the wraps or dressings underneath. If you see anything concerning (redness, excess moisture, etc), please call and let us know right away.      Should you experience any significant changes in your wound(s) (including redness, increased warmth, increased pain, increased drainage, odor, or fever) or have questions about your wound care, please contact the [facility] at [phone] Monday-Thursday from 8:00 am - 4:30 pm, or Friday from 8:00 am - 2:30 pm.  If you need help with your wound outside these hours and cannot wait until we are again available, contact your home-care company (if applicable), your PCP, or go to the nearest emergency room

## 2023-03-03 ENCOUNTER — HOSPITAL ENCOUNTER (OUTPATIENT)
Dept: WOUND CARE | Age: 73
Discharge: HOME | End: 2023-03-03
Payer: MEDICARE

## 2023-03-03 VITALS
TEMPERATURE: 98.6 F | DIASTOLIC BLOOD PRESSURE: 74 MMHG | SYSTOLIC BLOOD PRESSURE: 154 MMHG | HEART RATE: 85 BPM | RESPIRATION RATE: 16 BRPM | BODY MASS INDEX: 27.52 KG/M2 | HEIGHT: 68 IN | WEIGHT: 181.6 LBS

## 2023-03-03 DIAGNOSIS — L89.154 PRESSURE ULCER OF COCCYGEAL REGION, STAGE 4 (HCC): ICD-10-CM

## 2023-03-03 DIAGNOSIS — L92.9 HYPERGRANULATION: ICD-10-CM

## 2023-03-03 DIAGNOSIS — I87.2 VENOUS INSUFFICIENCY OF LEFT LOWER EXTREMITY: Primary | ICD-10-CM

## 2023-03-03 PROCEDURE — 97597 DBRDMT OPN WND 1ST 20 CM/<: CPT

## 2023-03-03 RX ORDER — LIDOCAINE HYDROCHLORIDE 40 MG/ML
SOLUTION TOPICAL ONCE
OUTPATIENT
Start: 2023-03-03 | End: 2023-03-03

## 2023-03-03 RX ORDER — LIDOCAINE 40 MG/G
CREAM TOPICAL ONCE
OUTPATIENT
Start: 2023-03-03 | End: 2023-03-03

## 2023-03-03 RX ORDER — BACITRACIN ZINC AND POLYMYXIN B SULFATE 500; 1000 [USP'U]/G; [USP'U]/G
OINTMENT TOPICAL ONCE
OUTPATIENT
Start: 2023-03-03 | End: 2023-03-03

## 2023-03-03 RX ORDER — LIDOCAINE 40 MG/G
CREAM TOPICAL ONCE
Status: DISCONTINUED | OUTPATIENT
Start: 2023-03-03 | End: 2023-03-04 | Stop reason: HOSPADM

## 2023-03-03 RX ORDER — LIDOCAINE 50 MG/G
OINTMENT TOPICAL ONCE
OUTPATIENT
Start: 2023-03-03 | End: 2023-03-03

## 2023-03-03 NOTE — PLAN OF CARE
215 [facility] Physician Orders and Discharge [address], [address]  [place], [facility]  Telephone: [phone]      Fax: [phone]        Your home care company:   [facility]         Your wound-care supplies will be provided by: [employer] orders supplies through [employer]. Please note, depending on your insurance coverage, you may have out-of-pocket expenses for these supplies. Someone from [facility] may call you to confirm your order and discuss those potential costs before they ship your products -- please anticipate that call. If your out-of-pocket cost is going to be less than $200, and [facility] cannot reach you, they may ship your supplies as soon as the order is processed, and will send you a bill. If your out-of-pocket cost is going to be more than $200, [facility] should not ship your supplies until they speak with you. If you have any questions about your supplies or your potential out-of-pocket costs, or if you need to place an order for a refill of supplies (typically monthly), Olive May is your local representative, and can be reached at [phone]. Information on [facility]'s return policy will also be enclosed with your supplies -- please read that carefully before opening your items. NAME:  Tracey Taveras   YOB: 1950  PRIMARY DIAGNOSIS FOR WOUND CARE CENTER:  Pressure ulcer     Wound cleansing:  Do not scrub or use excessive force. Wash hands with soap and water before and after dressing changes. Prior to applying a clean dressing, cleanse wound with normal saline, wound cleanser, or mild soap and water. Ask your physician or nurse before getting the wound(s) wet in the shower.                 Wound care for home:      Sacral Wound:  Betadine to maegan wound   Triad or Zinc Oxide to maegan-wound  Multidex Powder then Collagen with silver to wound bed & tuck into depth of wound and into undermining   Fluff gauze over wound  Gauze bolster using rosendo at the marked area - if you are able to tolerate the entire roll, great, if not you may cut and use less. (Bolster supplies provided today)  Cover with small sacral mepilex border  [facility] to change dressing on Monday, Wednesday, Friday next week        Please note, all wounds (unless stated otherwise here) were mechanically debrided at the time of cleansing here in the wound-care center today, so a small amount of pain, drainage or bleeding from that process might be expected, and is normal.     All products for home use, including multiple products for a single wound if applicable, are medically necessary in order to achieve the best chance at timely wound healing. See provider documentation for details if needed. Substituted dressings applied in the [facility] today, if applicable:           New orders for this week (labs, imaging, medications, etc.):        Additional instructions for specific diagnoses:     Possible RAMON discussed for next visit      +ROHO cushion- use at [address] when sitting!!  +True Balance Air group II mattress      General comments for pressure ulcers: *  Make sure you stay well-hydrated, and maintain good protein intake. *  Reposition at least every two hours, to keep from having pressure in one spot for too long. *  If you are not sure whether you have the best offloading surfaces (mattress, mattress overlay, chair cushion, heel-protector boots, etc), please ask. *  Moisturize your skin regularly with Vaseline, Aquaphor, Aveeno, CeraVe, Cetaphil, Eucerin, Lubriderm, etc; but keep the skin between your toes dry. *  If you smoke, your wound can not heal properly -- please talk with us when you're ready to quit.         F/U Appointment is with Dr. Sage Elder in 1 week on                                              at                       .     Your nurse  is Erik Celeste,     If we applied slip-resistant hospital socks today, be sure to remove them at least once a day to inspect your toes or feet, even if you're not changing the wraps or dressings underneath. If you see anything concerning (redness, excess moisture, etc), please call and let us know right away.      Should you experience any significant changes in your wound(s) (including redness, increased warmth, increased pain, increased drainage, odor, or fever) or have questions about your wound care, please contact the [facility] at [phone] Monday-Thursday from 8:00 am - 4:30 pm, or Friday from 8:00 am - 2:30 pm.  If you need help with your wound outside these hours and cannot wait until we are again available, contact your home-care company (if applicable), your PCP, or go to the nearest emergency room

## 2023-03-03 NOTE — PLAN OF CARE
Patient seen in [facility] today for follow up. Family member at bedside. Patient states pain has been minimal this week. Wound is showing signs of healing. Wound debridement per Dr. Otis Cash. Patient tolerated well. We are making changes to home dressing routine. Will send the new instructions to home health care. Due to changes in dressing, patient will return to [facility] in 1 week (Thursday). Discharge instructions reviewed with patient, all questions answered, copy given to patient. Dressings were applied to all wounds per M.D. Instructions at this visit.

## 2023-03-09 ENCOUNTER — HOSPITAL ENCOUNTER (OUTPATIENT)
Dept: WOUND CARE | Age: 73
Discharge: HOME | End: 2023-03-09
Payer: MEDICARE

## 2023-03-09 ENCOUNTER — OFFICE VISIT (OUTPATIENT)
Dept: CARDIOLOGY CLINIC | Age: 73
End: 2023-03-09
Payer: MEDICARE

## 2023-03-09 VITALS
DIASTOLIC BLOOD PRESSURE: 69 MMHG | SYSTOLIC BLOOD PRESSURE: 116 MMHG | HEART RATE: 71 BPM | BODY MASS INDEX: 26.36 KG/M2 | TEMPERATURE: 98.6 F | OXYGEN SATURATION: 96 % | HEIGHT: 69 IN | WEIGHT: 178 LBS

## 2023-03-09 VITALS
TEMPERATURE: 97.7 F | SYSTOLIC BLOOD PRESSURE: 129 MMHG | HEART RATE: 74 BPM | BODY MASS INDEX: 26.1 KG/M2 | RESPIRATION RATE: 18 BRPM | DIASTOLIC BLOOD PRESSURE: 69 MMHG | WEIGHT: 176.2 LBS | HEIGHT: 69 IN

## 2023-03-09 DIAGNOSIS — L92.9 HYPERGRANULATION: ICD-10-CM

## 2023-03-09 DIAGNOSIS — I25.10 CORONARY ARTERY DISEASE INVOLVING NATIVE CORONARY ARTERY OF NATIVE HEART WITHOUT ANGINA PECTORIS: ICD-10-CM

## 2023-03-09 DIAGNOSIS — I50.22 CHRONIC SYSTOLIC CONGESTIVE HEART FAILURE (HCC): Primary | ICD-10-CM

## 2023-03-09 DIAGNOSIS — L89.154 PRESSURE ULCER OF COCCYGEAL REGION, STAGE 4 (HCC): ICD-10-CM

## 2023-03-09 DIAGNOSIS — I87.2 VENOUS INSUFFICIENCY OF LEFT LOWER EXTREMITY: Primary | ICD-10-CM

## 2023-03-09 PROCEDURE — 97597 DBRDMT OPN WND 1ST 20 CM/<: CPT

## 2023-03-09 PROCEDURE — 3074F SYST BP LT 130 MM HG: CPT | Performed by: NURSE PRACTITIONER

## 2023-03-09 PROCEDURE — 1123F ACP DISCUSS/DSCN MKR DOCD: CPT | Performed by: NURSE PRACTITIONER

## 2023-03-09 PROCEDURE — 3078F DIAST BP <80 MM HG: CPT | Performed by: NURSE PRACTITIONER

## 2023-03-09 PROCEDURE — 99214 OFFICE O/P EST MOD 30 MIN: CPT | Performed by: NURSE PRACTITIONER

## 2023-03-09 RX ORDER — SPIRONOLACTONE 25 MG/1
12.5 TABLET ORAL DAILY
Qty: 90 TABLET | Refills: 3
Start: 2023-03-09

## 2023-03-09 RX ORDER — TORSEMIDE 20 MG/1
20 TABLET ORAL DAILY
Qty: 30 TABLET | Refills: 3
Start: 2023-03-09

## 2023-03-09 RX ORDER — LIDOCAINE 40 MG/G
CREAM TOPICAL ONCE
OUTPATIENT
Start: 2023-03-09 | End: 2023-03-09

## 2023-03-09 RX ORDER — LIDOCAINE 40 MG/G
CREAM TOPICAL ONCE
Status: DISCONTINUED | OUTPATIENT
Start: 2023-03-09 | End: 2023-03-10 | Stop reason: HOSPADM

## 2023-03-09 RX ORDER — BACITRACIN ZINC AND POLYMYXIN B SULFATE 500; 1000 [USP'U]/G; [USP'U]/G
OINTMENT TOPICAL ONCE
OUTPATIENT
Start: 2023-03-09 | End: 2023-03-09

## 2023-03-09 RX ORDER — LIDOCAINE HYDROCHLORIDE 40 MG/ML
SOLUTION TOPICAL ONCE
OUTPATIENT
Start: 2023-03-09 | End: 2023-03-09

## 2023-03-09 RX ORDER — LIDOCAINE 50 MG/G
OINTMENT TOPICAL ONCE
OUTPATIENT
Start: 2023-03-09 | End: 2023-03-09

## 2023-03-09 ASSESSMENT — ENCOUNTER SYMPTOMS: SHORTNESS OF BREATH: 1

## 2023-03-09 NOTE — PLAN OF CARE
215 [facility] Physician Orders and Discharge [address], [address]  [place], [facility]  Telephone: [phone]      Fax: [phone]        Your home care company:   [facility]         Your wound-care supplies will be provided by: [employer] orders supplies through [employer]. Please note, depending on your insurance coverage, you may have out-of-pocket expenses for these supplies. Someone from [facility] may call you to confirm your order and discuss those potential costs before they ship your products -- please anticipate that call. If your out-of-pocket cost is going to be less than $200, and [facility] cannot reach you, they may ship your supplies as soon as the order is processed, and will send you a bill. If your out-of-pocket cost is going to be more than $200, [facility] should not ship your supplies until they speak with you. If you have any questions about your supplies or your potential out-of-pocket costs, or if you need to place an order for a refill of supplies (typically monthly), Bradley Geller is your local representative, and can be reached at [phone]. Information on [facility]'s return policy will also be enclosed with your supplies -- please read that carefully before opening your items. NAME:  Win Taveras   YOB: 1950  PRIMARY DIAGNOSIS FOR WOUND CARE CENTER:  Pressure ulcer     Wound cleansing:  Do not scrub or use excessive force. Wash hands with soap and water before and after dressing changes. Prior to applying a clean dressing, cleanse wound with normal saline, wound cleanser, or mild soap and water. Ask your physician or nurse before getting the wound(s) wet in the shower.                 Wound care for home:      Sacral Wound:  Betadine to maegan wound   Triad or Zinc Oxide to maegan-wound  Multidex Powder then Collagen with silver to wound bed & tuck into depth of wound and into undermining   Fluff gauze over wound  Gauze bolster using rosendo at the marked area - if you are able to tolerate the entire roll, great, if not you may cut and use less. (Bolster supplies provided today)  Cover with small sacral mepilex border  [facility] to change dressing on Monday, Wednesday, Friday next week        Please note, all wounds (unless stated otherwise here) were mechanically debrided at the time of cleansing here in the wound-care center today, so a small amount of pain, drainage or bleeding from that process might be expected, and is normal.     All products for home use, including multiple products for a single wound if applicable, are medically necessary in order to achieve the best chance at timely wound healing. See provider documentation for details if needed. Substituted dressings applied in the [facility] today, if applicable:           New orders for this week (labs, imaging, medications, etc.):        Additional instructions for specific diagnoses:     Possible RAMON discussed for next visit      +ROHO cushion- use at [address] when sitting!!  +True Balance Air group II mattress      General comments for pressure ulcers: *  Make sure you stay well-hydrated, and maintain good protein intake. *  Reposition at least every two hours, to keep from having pressure in one spot for too long. *  If you are not sure whether you have the best offloading surfaces (mattress, mattress overlay, chair cushion, heel-protector boots, etc), please ask. *  Moisturize your skin regularly with Vaseline, Aquaphor, Aveeno, CeraVe, Cetaphil, Eucerin, Lubriderm, etc; but keep the skin between your toes dry. *  If you smoke, your wound can not heal properly -- please talk with us when you're ready to quit.         F/U Appointment is with Dr. Joanne Luna in 2 weeks on                                              at                       .     Your nurse  is Brittaney Parker,     If we applied slip-resistant hospital socks today, be sure to remove them at least once a day to inspect your toes or feet, even if you're not changing the wraps or dressings underneath. If you see anything concerning (redness, excess moisture, etc), please call and let us know right away.      Should you experience any significant changes in your wound(s) (including redness, increased warmth, increased pain, increased drainage, odor, or fever) or have questions about your wound care, please contact the [facility] at [phone] Monday-Thursday from 8:00 am - 4:30 pm, or Friday from 8:00 am - 2:30 pm.  If you need help with your wound outside these hours and cannot wait until we are again available, contact your home-care company (if applicable), your PCP, or go to the nearest emergency room

## 2023-03-09 NOTE — PATIENT INSTRUCTIONS
Decrease torsemide to 20 mg daily; ok to hold if dizzy or low BP  Take metoprolol at night  Decrease spironolactone 12.5 mg; if continued dizziness we may hold completely  Continue entresto 1/2 pill twice a day with food  Follow up in 1 month with me in 3 months with Dr. Chandler Shafer

## 2023-03-09 NOTE — PLAN OF CARE
Patient seen in [facility] for follow up. Female family member present at bedside. Wound stable and showing signs of improvement. Wound debridement per Dr. Maria E Sandoval. Discussion of injecting lidocaine and excising tissue to maegan-wound to promote wound healing, on hold at this time. RAMON NPWT discussed, Pt declines at this time. To continue current plan of care and if in two weeks no improvement, to revisit RAMON dressing again. Follow up in [address] [place] [address] in 2 weeks as ordered. Pt. Aware to call sooner with any problems or questions/concerns. MD orders/D/C instructions reviewed with patient, all questions answered; copy of instructions given to patient.

## 2023-03-09 NOTE — PROGRESS NOTES
[address]   Cardiology Note              Date:  March 9, 2023  Patientname: Richelle Finnegan  YOB: 1950    Primary Care physician: Concepción Busby MD    HISTORY OF PRESENT ILLNESS: Richelle Finnegan is a 71 y.o. female with a history of CAD, CHF, cardiomyopathy, MR, PVD, DM, DANIELLE, sacral ulcer, COPD, RA.   - She had right fem-pop bypass 9/2017. - She was admitted 4/2021 for chest pain and troponin elevated. [facility]  4/26/2021 showed severe CAD. Echo showed EF 55%. On 5/3/2021 she had CABG x3, BIMAL clip. She required evacuation of hematoma 5/5/2021.   - She was admitted 7/2021 for cellulitis at vein harvest site requiring I&D on 7/13/2021.   - She was admitted 8/10/2021 for shortness of breath and treated for CHF. Echo showed EF 50%, possibly severe MR. [facility] 8/16/2021 showed occluded SVG-OM, suspect severe MR due to papillary dysfunction r/t occluded RCA/LCx. - ABHISHEK 10/2021 showed EF 30%, mild MR, +grade 1 pulmonary AV malformation. - Echo 3/2022 showed EF 30-35%, moderate MR.   - At office visit 4/2022, entresto started. - Echo 8/2022 showed EF 50-55%, mild-moderate MR, mild AI, moderate TR. Today she presents for follow up for CAD, CHF. She feels well overall but does have occasional low BP 90s/50s and dizziness. She has shortness of breath she attributes to COPD that is stable. She has no chest pain, syncope, palpitations. Edema is stable. She is in a wheelchair today, activity limited by back pain and RA flare ups. She drinks <64 ounces a day but does eat a salty diet. She has gained weight she states due to improved appetite, she had lost a lot of weight after CABG 2021.     Office weight today 3/9/2023: 178 lbs (175-178 lbs at home)  Office weight 4/19/2022: 156 lbs    Cardiologist: Dr. Melinda Toney 8/2022    Past Medical History:   has a past medical history of Acute on chronic diastolic heart failure due to coronary artery disease ([address] Acute respiratory failure with hypoxia ([address] Atherosclerosis of native artery of right lower extremity with rest pain ([address] Back pain, Branch retinal vein occlusion, Bronchiectasis with acute exacerbation ([facility]), Cellulitis and abscess of left leg, Cellulitis of left lower extremity, CHF (congestive heart failure) ([facility]), Closed compression fracture of thoracic vertebra ([facility]), Closed fracture of facial bone with routine healing, Closed jaw fracture ([address] Community acquired pneumonia of left lower lobe of lung, Compression fracture of L1 lumbar vertebra ([facility]), COPD (chronic obstructive pulmonary disease) ([address] COVID, Fracture of tibial plateau, closed, left, initial encounter, HCAP (healthcare-associated pneumonia), Minimally displaced zone I fracture of sacrum ([facility]), MRSA (methicillin resistant staph aureus) culture positive, MRSA (methicillin resistant Staphylococcus aureus), Mucus plugging of bronchi, NSTEMI (non-ST elevated myocardial infarction) ([address] Osteomyelitis of mandible, Osteoporosis with pathological fracture, Post herpetic neuralgia, Pressure ulcer of left heel, stage 3 ([facility]), Proximal humerus fracture, Rheumatoid arthritis ([address] Shingles, Sleep apnea, Status post incision and drainage, Surgical wound dehiscence, initial encounter (at LLE SVG harvest site), Temporal arteritis ([address] Tobacco use, Tracheomalacia, and Vitreous hemorrhage, right eye ([address] Past Surgical History:   has a past surgical history that includes hernia repair; Mandible fracture surgery; Foot surgery; Elbow surgery; Cataract removal; Tubal ligation; Knee arthroscopy; Kyphosis surgery; laminectomy; Spinal fusion; Septoplasty (05/07/2013); Artery surgery (05/30/2013); Upper gastrointestinal endoscopy (04/08/2014); bronchoscopy; bronchoscopy (N/A, 06/12/2019); Mandible fracture surgery (02/2020); back surgery (08/2020); other surgical history (01/08/2021); Pressure ulcer debridement (N/A, 01/08/2021); Artery Biopsy (Right, 03/01/2021);  Coronary artery bypass graft (N/A, 05/03/2021); Mediastinoscopy (N/A, 05/05/2021); Cardiac surgery (05/03/2021); Leg Surgery (Left, 7/13/2021); IR MIDLINE CATH (7/14/2021); transesophageal echocardiogram (11/02/2021); and Colonoscopy. Home Medications:    Prior to Admission medications    Medication Sig Start Date End Date Taking?  Authorizing Provider   insulin glargine (LANTUS) 100 UNIT/ML injection vial Inject 15 Units into the skin 2 times daily 2/21/23   Vicky Scott MD   sacubitril-valsartan [facility]) 24-26 MG per tablet Take half tablet twice a day 9/6/22   Jacqueline Dailey MD   albuterol sulfate HFA (PROVENTIL;VENTOLIN;PROAIR) 108 (90 Base) MCG/ACT inhaler Inhale 2 puffs into the lungs every 4 hours as needed for Wheezing 8/8/22   SAM Gusman   DALIRESP 500 MCG tablet TAKE ONE TABLET BY MOUTH EVERY DAY 7/18/22   SAM Gusman   DENOSUMAB SC Inject into the skin every 6 months Prolia    Historical Provider, MD   cyclobenzaprine (FLEXERIL) 10 MG tablet TAKE ONE TABLET BY MOUTH TWICE DAILY AS NEEDED 4/26/22   Historical Provider, MD   DULoxetine (CYMBALTA) 60 MG extended release capsule Take 60 mg by mouth 2 times daily 3/25/22   Historical Provider, MD   Etanercept (ENBREL SURECLICK) 50 MG/ML SOAJ Inject 1 Adjustable Dose Pre-filled Pen Syringe into the skin once a week    Historical Provider, MD   torsemide (DEMADEX) 20 MG tablet Take 2 tablets by mouth daily If weight 131 lbs or less, decrease to 20 mg daily 2/22/22   Aldean Blocker, MD   metoprolol succinate (TOPROL XL) 25 MG extended release tablet Take 0.5 tablets by mouth daily 2/22/22   Aldean MD Ashlie   spironolactone (ALDACTONE) 25 MG tablet Take 1 tablet by mouth daily 2/22/22   Aldean Blocker, MD   clopidogrel (PLAVIX) 75 MG tablet Take 1 tablet by mouth daily 2/22/22   Janiceean MD Ashlie   azithromycin (ZITHROMAX) 250 MG tablet Take 1 tablet by mouth daily 2/7/22   [facility] МАРИЯ Batres PA-C   predniSONE (DELTASONE) 1 MG tablet Take 4 mg by mouth daily     Historical Provider, MD   oxyCODONE-acetaminophen (PERCOCET)  MG per tablet Take 1 tablet by mouth daily. 1/11/22   Historical Provider, MD   docusate sodium (COLACE) 100 MG capsule Take 100 mg by mouth 2 times daily as needed for Constipation    Historical Provider, MD   gabapentin (NEURONTIN) 300 MG capsule Take 300 mg by mouth in the morning and at bedtime. Historical Provider, MD   latanoprost (XALATAN) 0.005 % ophthalmic solution Place 1 drop into both eyes nightly    Historical Provider, MD   NARCAN 4 MG/0.1ML LIQD nasal spray as needed   Patient not taking: Reported on 2/18/2023 10/20/20   Historical Provider, MD   morphine (MS CONTIN) 15 MG extended release tablet 15 mg 2 times daily. 10/16/20   Historical Provider, MD   ipratropium (ATROVENT) 0.06 % nasal spray USE 2 SPRAYS BY NASAL ROUTE 2-4 TIMES DAILY  Patient taking differently: 3 times daily as needed 10/29/20   Shoaib Bynum MD   vitamin D (CHOLECALCIFEROL) 1000 UNIT TABS tablet Take 5,000 Units by mouth daily     Historical Provider, MD   calcium carbonate (OSCAL) 500 MG TABS tablet Take 500 mg by mouth daily    Historical Provider, MD   atorvastatin (LIPITOR) 40 MG tablet Take 40 mg by mouth daily 10/16/18   Historical Provider, MD   insulin lispro (HUMALOG) 100 UNIT/ML injection vial Inject 0-12 Units into the skin 3 times daily (with meals) 10/23/17   Bobo Clark MD   fluticasone (FLONASE) 50 MCG/ACT nasal spray INHALE 2 SPRAYS IN [facility] NOSTRIL DAILY 11/25/13   Shoaib Bynum MD   cetirizine (ZYRTEC) 10 MG tablet Take 10 mg by mouth daily. Historical Provider, MD   omeprazole (PRILOSEC) 40 MG capsule Take 40 mg by mouth daily     Historical Provider, MD     Allergies:  Atenolol    Social History:   reports that she quit smoking about 31 years ago. Her smoking use included cigarettes. She has a 20.00 pack-year smoking history.  She has never used smokeless tobacco. She reports that she does not currently use alcohol. She reports that she does not use drugs.     Family History: family history includes Asthma in an other family member; Diabetes in her brother, mother, and sister; Heart Disease in her brother and brother; Hypertension in her mother.    Review of Systems   Review of Systems   Constitutional: Negative.    Respiratory:  Positive for shortness of breath.    Cardiovascular:  Positive for leg swelling. Negative for chest pain and palpitations.   Neurological:  Positive for dizziness.   OBJECTIVE:    Vital signs:    /69   Pulse 71   Temp 98.6 °F (37 °C)   Ht 5' 8.5\" (1.74 m)   Wt 178 lb (80.7 kg)   SpO2 96%   BMI 26.67 kg/m²      Physical Exam:  Constitutional:  Comfortable and alert, NAD, appears older than stated age, frail  Eyes: PERRL, sclera nonicteric  Neck:  Supple, no masses, no thyroidmegaly, JVP <8  Skin:  Warm and dry; no rash or lesions; +sternal incision  Heart: Regular, normal apex, S1 and S2 normal, no M/G/R  Lungs:  Normal respiratory effort; clear; no wheezing/rhonchi/rales  Abdomen: soft, non tender, + bowel sounds  Extremities:  1+ BLE edema, L>R  Neuro: alert and oriented, moves legs and arms equally, normal mood and affect    Data Reviewed:      Echo 8/2022:   Global left ventricular systolic function is low normal with ejection   fraction estimated from 50 % to 55 %.   Abnormal (paradoxical) septal motion is present likely due to post operative   status.   Mild to moderate mitral regurgitation.   Mild aortic regurgitation.   Moderate tricuspid regurgitation.   Compared to exam done 3/24/2022, Left ventricular systolic function has   improved.    Echo 3/2022:  Left ventricular systolic function is moderately reduced with ejection   fraction estimated at 30-35 %.   Moderate global hypokinesis is present.   Left ventricular size is severely increased. Heart is spherical.Wall   thickness is normal.   The left atrium is mildly dilated.   The ascending aorta is mildly dilated at  3.6cm.   Grade II diastolic dysfunction with elevated filling pressure. Moderate mitral regurgitation. Mild aortic regurgitation is present. Moderate tricuspid regurgitation. Normal systolic pulmonary artery pressure (SPAP) estimated at 37 mmHg (RA   pressure 8 mmHg). Mild pulmonic regurgitation present. 9- Limited echo less than 30% moderate MR    ABHISHEK 10/2021:   -- Ejection fraction is visually estimated to be 30 %. -- Mild mitral regurgitation. -- The left atrium is mildly dilated. There is no evidence of mass or   thrombus in the left atrium or appendage. -- Bubble study was performed and showed grade I pulmonary arteriovenous   malformation ( < 30 bubbles in left ventricle after 3rd cardiac cycle). Moderate layered plaque is noted in the ascending aorta and arch. Echo 8/12/2021:  -Left ventricular cavity size is enlarged. LVESD=5.57 cm   -Overall left ventricular systolic function appears mildly reduced.   -Ejection fraction is visually estimated to be 50%. -There is mild hypokinesis of the basal to mid inferior and inferolateral   walls. -Grade II diastolic dysfunction with elevated LV filling pressures.   -The right ventricle is mildly enlarged.   -Right ventricular systolic function is normal.   -Mitral valve leaflets appear mildly thickened. -Mitral regurgitation that may be severe. -The aortic valve leaflets appear mildly thickened. -Mild aortic regurgitation.   -Moderate tricuspid regurgitation with a PASP of 57 mmHg.   -Trivial pulmonic regurgitation.     Coronary angiogram 8/16/2021:  Findings:  Artery Findings/Result   LM Normal   LAD Mid 100% after large diagonal, distal supplied by LIMA   Cx 99% ostial, distal supplied by R-L collaterals   RI N/A   RCA 70% distal, % prox   S-O occluded   S-PLB patent   L-LAD  patent   LVEDP 8   LVG NA due to contrast    Intervention:         None   Post Cath Dx:       Severe native disease as above  Occluded S-OM  Suspect severe MR related to papillary dysfunction related to occluded distal RCA and Cx  Consider MVR when wound issues resolve if MR does not improved with medical therapy  Would followup with Dr. Arreola Post    CABG 5/3/2021:  Urgent CABG X 3, with pedicled LIMA to LAD, single Greater Saphenous VG to PV br of RCA, separate single Greater SVG to OM2,  LAAppendage obliteration with 40 mm Atricure LA Clip, CPB, EVH Left Greater Saphenous vein, ABHISHEK, Epiaortic ultrasound, Doppler verification of grafts, Bilateral 5 level intercostal nerve block(Exparel), Platelet gel application. Sternal plating. Echo 4/24/2021:  Normal left ventricle systolic function with an estimated ejection fraction   of 55%. No regional wall motion abnormalities are seen. Normal left ventricular diastolic filling pressure. Mild eccentric aortic regurgitation. Mild mitral and tricuspid regurgitation. Systolic pulmonary artery pressure (SPAP) is normal and estimated at 27 mmHg   (right atrial pressure 3 mmHg). Cardiology Labs Reviewed:   CBC:   No results for input(s): WBC, HGB, HCT, PLT in the last 72 hours. BMP:  No results for input(s): NA, K, CO2, BUN, CREATININE, LABGLOM, GLUCOSE in the last 72 hours. PT/INR: No results for input(s): PROTIME, INR in the last 72 hours. APTT:No results for input(s): APTT in the last 72 hours. FASTING LIPID PANEL:  Lab Results   Component Value Date/Time    HDL 34 10/29/2021 08:23 AM    HDL 43 07/22/2010 09:28 AM    LDLCALC 61 10/29/2021 08:23 AM    TRIG 85 10/29/2021 08:23 AM     LIVER PROFILE:  No results for input(s): AST, ALT, ALB in the last 72 hours.     BNP:   Lab Results   Component Value Date/Time    PROBNP 2,431 02/17/2023 03:41 PM    PROBNP 3,742 08/08/2022 07:58 PM    PROBNP 2,255 06/06/2022 06:45 PM    PROBNP 3,878 04/27/2022 12:30 PM    PROBNP 3,215 03/17/2022 12:40 PM     Reviewed all labs and imaging today    Assessment:   Chronic systolic CHF: appears compensated (weight gain likely tissue weight/appetite)  Cardiomyopathy, likely mixed ischemic and valvular: improved; EF 50-55% on echo 8/2022 from EF 30-35% on echo 4/2022; EF 55% prior to CABG 4/2021   - EF previously felt to be decreased with loss of SVG-OM and previous severe MR  Mitral regurgitation: mild-moderate on echo 8/2022; mild on ABHISHEK 10/2021; severe on echo 8/2021; likely related to papillary dysfunction/occluded dRCA, LCx, SVG  CAD: no current angina; severe native disease and occluded SVG-OM on C 8/16/2021: s/p CABG x3 and BIMAL clip 5/3/2021  HTN: low BP noted  HLD: controlled, LDL 61, statin  DM: uncontrolled, hgb a1c 8.4  DANIELLE  COPD  Tracheomalacia  Anemia  RA  Chronic sacral decubitus ulcer: follows with wound clinic    Plan:   1. Decrease torsemide to 20 mg daily from 40 mg due to hypotension and dizziness; ok to take an additional if needed for edema or weight gain  2. Decrease spironolactone to 12.5 mg daily due to dizziness/hypotension  3. Take toprol 12.5 mg at night  4. Continue 1/2 tablet entresto 24-26 mg BID  5. Sodium and fluid recommendations discussed  6. Stay active along with a healthy diet  7. Check BP at home and call the office if consistently out of goal range  8.  Follow up with me in 1 month and in 3 months with Dr. Isamar Shin; previously followed with Dr. Dale Cox and she would like to establish with MD at [address] [place] [address] [address]  [phone]

## 2023-03-09 NOTE — DISCHARGE INSTRUCTIONS
[address] [facility] Physician Orders and Discharge [address], [address]  [place], [facility]  Telephone: [phone]      Fax: [phone]        Your home care company:   [facility]         Your wound-care supplies will be provided by: [facility] AT [facility] orders supplies through [employer]. Please note, depending on your insurance coverage, you may have out-of-pocket expenses for these supplies. Someone from [facility] may call you to confirm your order and discuss those potential costs before they ship your products -- please anticipate that call. If your out-of-pocket cost is going to be less than $200, and [facility] cannot reach you, they may ship your supplies as soon as the order is processed, and will send you a bill. If your out-of-pocket cost is going to be more than $200, [facility] should not ship your supplies until they speak with you. If you have any questions about your supplies or your potential out-of-pocket costs, or if you need to place an order for a refill of supplies (typically monthly), Bob Izquierdo is your local representative, and can be reached at [phone]. Information on [facility]'s return policy will also be enclosed with your supplies -- please read that carefully before opening your items. NAME:  Kathryn Taveras   YOB: 1950  PRIMARY DIAGNOSIS FOR WOUND CARE CENTER:  Pressure ulcer     Wound cleansing:  Do not scrub or use excessive force. Wash hands with soap and water before and after dressing changes. Prior to applying a clean dressing, cleanse wound with normal saline, wound cleanser, or mild soap and water. Ask your physician or nurse before getting the wound(s) wet in the shower.                 Wound care for home:      Sacral Wound:  Betadine to maegan wound   Triad or Zinc Oxide to maegan-wound  Multidex Powder then Collagen with silver to wound bed & tuck into depth of wound and into undermining   Fluff gauze over wound  Gauze bolster using rosendo at the marked area - if you are able to tolerate the entire roll, great, if not you may cut and use less. (Bol[employer] supplies provided today)  Cover with small sacral mepilex border  [facility] to change dressing on Monday, Wednesday, Friday next week        Please note, all wounds (unless stated otherwise here) were mechanically debrided at the time of cleansing here in the wound-care center today, so a small amount of pain, drainage or bleeding from that process might be expected, and is normal.     All products for home use, including multiple products for a single wound if applicable, are medically necessary in order to achieve the best chance at timely wound healing. See provider documentation for details if needed. Substituted dressings applied in the [address] [place] [address] [address] today, if applicable:           New orders for this week (labs, imaging, medications, etc.):        Additional instructions for specific diagnoses:     Possible RAMON discussed for next visit      +ROHO cushion- use at [address] when sitting!!  +True Balance Air group II mattress      General comments for pressure ulcers: *  Make sure you stay well-hydrated, and maintain good protein intake. *  Reposition at least every two hours, to keep from having pressure in one spot for too long. *  If you are not sure whether you have the best offloading surfaces (mattress, mattress overlay, chair cushion, heel-protector boots, etc), please ask. *  Moisturize your skin regularly with Vaseline, Aquaphor, Aveeno, CeraVe, Cetaphil, Eucerin, Lubriderm, etc; but keep the skin between your toes dry. *  If you smoke, your wound can not heal properly -- please talk with us when you're ready to quit.         F/U Appointment is with Dr. Marian Duarte in 2 weeks on                                              at                       .     Your nurse  is Krishan Malik,     If we applied slip-resistant hospital socks today, be sure to remove them at least once a day to inspect your toes or feet, even if you're not changing the wraps or dressings underneath. If you see anything concerning (redness, excess moisture, etc), please call and let us know right away.      Should you experience any significant changes in your wound(s) (including redness, increased warmth, increased pain, increased drainage, odor, or fever) or have questions about your wound care, please contact the [email or web address] at [phone] Monday-Thursday from 8:00 am - 4:30 pm, or Friday from 8:00 am - 2:30 pm.  If you need help with your wound outside these hours and cannot wait until we are again available, contact your home-care company (if applicable), your PCP, or go to the nearest emergency room

## 2023-03-10 DIAGNOSIS — J44.9 CHRONIC OBSTRUCTIVE PULMONARY DISEASE, UNSPECIFIED COPD TYPE (HCC): ICD-10-CM

## 2023-03-10 DIAGNOSIS — J47.9 BRONCHIECTASIS WITHOUT COMPLICATION (HCC): ICD-10-CM

## 2023-03-10 RX ORDER — AZITHROMYCIN 250 MG/1
250 TABLET, FILM COATED ORAL DAILY
Qty: 90 TABLET | Refills: 3 | Status: SHIPPED | OUTPATIENT
Start: 2023-03-10

## 2023-03-13 PROBLEM — N17.9 ACUTE KIDNEY INJURY (HCC): Status: RESOLVED | Noted: 2023-02-18 | Resolved: 2023-03-13

## 2023-03-13 PROBLEM — J40 TRACHEOBRONCHITIS: Status: RESOLVED | Noted: 2023-02-18 | Resolved: 2023-03-13

## 2023-03-13 PROBLEM — J96.01 ACUTE RESPIRATORY FAILURE WITH HYPOXIA (HCC): Status: RESOLVED | Noted: 2023-02-17 | Resolved: 2023-03-13

## 2023-03-17 NOTE — TELEPHONE ENCOUNTER
Fasting labs.  Refilled medication.    F/U 6 months: Med refills.   Spoke to pt to relay message. V/U. Pt stated her SOB has improved slightly since 4/19/2022 JJP OV. PT reports having lost a few pounds over past week - does not have current weight. Pt stated she feels ok on Entresto. HHN reports wheezing in pts lungs - believes it is due to congestion / seasonal allergies and not fluid retention       ----- Message from Xiao Delgado MD sent at 4/28/2022 12:07 PM EDT -----  Let patient know that her renal panel is stable. She seems to be tolerating the Denyce Buster well. Her heart fluid levels remain elevated have been so for quite some time. See if she is feeling better on the Denyce Buster or how her shortness of breath is doing.   If she is still fairly symptomatic we may need to adjust diuresis

## 2023-03-20 NOTE — DISCHARGE INSTRUCTIONS
rosendo if you are able to tolerate the entire roll, great, if not you may cut and use less. (Bolster supplies provided today)  Cover with small sacral mepilex border  [facility] to change dressing on Monday, Wednesday, Friday next week and Monday and Wednesday the following week         Please note, all wounds (unless stated otherwise here) were mechanically debrided at the time of cleansing here in the wound-care center today, so a small amount of pain, drainage or bleeding from that process might be expected, and is normal.     All products for home use, including multiple products for a single wound if applicable, are medically necessary in order to achieve the best chance at timely wound healing. See provider documentation for details if needed. Substituted dressings applied in the [facility] today, if applicable:           New orders for this week (labs, imaging, medications, etc.):        Additional instructions for specific diagnoses:     Possible RAMON discussed for next visit      +ROHO cushion- use at [address] when sitting!!  +True Balance Air group II mattress      General comments for pressure ulcers: *  Make sure you stay well-hydrated, and maintain good protein intake. *  Reposition at least every two hours, to keep from having pressure in one spot for too long. *  If you are not sure whether you have the best offloading surfaces (mattress, mattress overlay, chair cushion, heel-protector boots, etc), please ask. *  Moisturize your skin regularly with Vaseline, Aquaphor, Aveeno, CeraVe, Cetaphil, Eucerin, Lubriderm, etc; but keep the skin between your toes dry. *  If you smoke, your wound can not heal properly -- please talk with us when you're ready to quit.         F/U Appointment is with Dr. Libertad Steele in 2 weeks on                                              at                       .     Your nurse  is Gracie Quinteros,     If we applied slip-resistant hospital socks today, be sure to remove them at
intact

## 2023-03-24 ENCOUNTER — HOSPITAL ENCOUNTER (OUTPATIENT)
Dept: WOUND CARE | Age: 73
Discharge: HOME | End: 2023-03-24
Payer: MEDICARE

## 2023-03-24 VITALS
RESPIRATION RATE: 18 BRPM | SYSTOLIC BLOOD PRESSURE: 99 MMHG | HEIGHT: 69 IN | BODY MASS INDEX: 25.86 KG/M2 | TEMPERATURE: 98.3 F | WEIGHT: 174.6 LBS | DIASTOLIC BLOOD PRESSURE: 52 MMHG | HEART RATE: 84 BPM

## 2023-03-24 DIAGNOSIS — I87.2 VENOUS INSUFFICIENCY OF LEFT LOWER EXTREMITY: Primary | ICD-10-CM

## 2023-03-24 DIAGNOSIS — L89.154 PRESSURE ULCER OF COCCYGEAL REGION, STAGE 4 (HCC): ICD-10-CM

## 2023-03-24 DIAGNOSIS — L92.9 HYPERGRANULATION: ICD-10-CM

## 2023-03-24 PROCEDURE — 97597 DBRDMT OPN WND 1ST 20 CM/<: CPT

## 2023-03-24 RX ORDER — LIDOCAINE 40 MG/G
CREAM TOPICAL ONCE
Status: DISCONTINUED | OUTPATIENT
Start: 2023-03-24 | End: 2023-03-25 | Stop reason: HOSPADM

## 2023-03-24 RX ORDER — BACITRACIN ZINC AND POLYMYXIN B SULFATE 500; 1000 [USP'U]/G; [USP'U]/G
OINTMENT TOPICAL ONCE
OUTPATIENT
Start: 2023-03-24 | End: 2023-03-24

## 2023-03-24 RX ORDER — LIDOCAINE 40 MG/G
CREAM TOPICAL ONCE
OUTPATIENT
Start: 2023-03-24 | End: 2023-03-24

## 2023-03-24 RX ORDER — LIDOCAINE HYDROCHLORIDE 40 MG/ML
SOLUTION TOPICAL ONCE
OUTPATIENT
Start: 2023-03-24 | End: 2023-03-24

## 2023-03-24 RX ORDER — LIDOCAINE 50 MG/G
OINTMENT TOPICAL ONCE
OUTPATIENT
Start: 2023-03-24 | End: 2023-03-24

## 2023-03-24 ASSESSMENT — PAIN - FUNCTIONAL ASSESSMENT: PAIN_FUNCTIONAL_ASSESSMENT: ACTIVITIES ARE NOT PREVENTED

## 2023-03-24 ASSESSMENT — PAIN SCALES - GENERAL: PAINLEVEL_OUTOF10: 2

## 2023-03-24 ASSESSMENT — PAIN DESCRIPTION - ORIENTATION: ORIENTATION: MID

## 2023-03-24 ASSESSMENT — PAIN DESCRIPTION - LOCATION: LOCATION: COCCYX

## 2023-03-24 ASSESSMENT — PAIN DESCRIPTION - DESCRIPTORS: DESCRIPTORS: ACHING;BURNING

## 2023-03-24 NOTE — PLAN OF CARE
Patient seen in [address] [place] [address],[address] today for follow up. She reports feeling well overall. She states she received her new cushion. Wound is showing some signs of improvement. Epibole noted. We discussed a possible RAMON dressing at next visit. Patient agreeable. F/u in [address] [place] [address],[address] in 2 weeks as ordered, pt. Aware to call sooner with any changes or questions/concerns. Discharge instructions reviewed with patient, all questions answered, copy given to patient. Dressings were applied to all wounds per M.D. Instructions at this visit.
least once a day to inspect your toes or feet, even if you're not changing the wraps or dressings underneath. If you see anything concerning (redness, excess moisture, etc), please call and let us know right away.      Should you experience any significant changes in your wound(s) (including redness, increased warmth, increased pain, increased drainage, odor, or fever) or have questions about your wound care, please contact the [facility] at [phone] Monday-Thursday from 8:00 am - 4:30 pm, or Friday from 8:00 am - 2:30 pm.  If you need help with your wound outside these hours and cannot wait until we are again available, contact your home-care company (if applicable), your PCP, or go to the nearest emergency room

## 2023-03-31 ENCOUNTER — TELEPHONE (OUTPATIENT)
Dept: PULMONOLOGY | Age: 73
End: 2023-03-31

## 2023-03-31 RX ORDER — PREDNISONE 10 MG/1
TABLET ORAL
Qty: 30 TABLET | Refills: 0 | Status: SHIPPED | OUTPATIENT
Start: 2023-03-31

## 2023-03-31 RX ORDER — CEPHALEXIN 500 MG/1
500 CAPSULE ORAL 2 TIMES DAILY
Qty: 14 CAPSULE | Refills: 0 | Status: SHIPPED | OUTPATIENT
Start: 2023-03-31 | End: 2023-04-07

## 2023-03-31 NOTE — TELEPHONE ENCOUNTER
Please tell pt I sent in Prednisone taper & abx. She is welcome to use OTC medicines in combination with this for symptom relief. Hope you feel better soon Macario Light.

## 2023-03-31 NOTE — TELEPHONE ENCOUNTER
Do you have the following symptoms? Shortness of Breath  yes  Wheezing  yes  Cough  yes  Cough Characteristics:  Productive    yes  Sputum Color    green  Hemoptysis   no  Consistency of sputum   thick     Fever:    no    Temp:no  Chills/Sweats:  no  What other symptoms are you having?:  headache    How long have you had these symptoms? 2 days     Pharmacy: [facility]    Have you been vaccinated for covid? Yes  Have you received a booster vaccine? No          Review medications and allergies: Allergies? Allergies   Allergen Reactions    Atenolol Other (See Comments)     Cough               Currently on Antibiotics? (Drug/Dose/Frequency and how long on?) Azithromycin 250mg daily        Systemic Steroids? (Drug/Dose/Frequency and how long on?) Prednisone 4mg daily       Last OV 12/12/23 with Dr. Waunita Peabody   (pull in last visit note assessment/plan)   ASSESSMENT:   Abnormal CT has resolved  COPD/Chronic bronchitis/cough with cylindrical bronchiectasis    Centrilobular pulmonary emphysema  H/O COVID 43/41/97 complicated by MRSA pneumonia   Mild DANIELLE/REM related sleep disordered breathing - recently restarted CPAP with benefit   CAD s/p CABG 5/3/21  Tracheomalacia    Lower extremity edema  Pulmonary Nodules have resolved  Rheumatoid arthritis on Embrel and MTX and 4 mg prednisone  Positive tobacco history, 20 pack year quit in 1991  Diabetes     PLAN:   Continue azithromycin daily  Continue Daliresp; off Singulair  -- Failed Singulair, Failed Dulera, Failed Spiriva, Failed Advair. I recommended regular use of CPAP.      F/U will be with me in 4-6 months

## 2023-04-07 ENCOUNTER — TELEPHONE (OUTPATIENT)
Dept: CARDIOLOGY CLINIC | Age: 73
End: 2023-04-07

## 2023-04-07 ENCOUNTER — HOSPITAL ENCOUNTER (OUTPATIENT)
Dept: WOUND CARE | Age: 73
Discharge: HOME | End: 2023-04-07
Payer: MEDICARE

## 2023-04-07 VITALS
DIASTOLIC BLOOD PRESSURE: 72 MMHG | HEART RATE: 80 BPM | RESPIRATION RATE: 18 BRPM | BODY MASS INDEX: 26.48 KG/M2 | SYSTOLIC BLOOD PRESSURE: 125 MMHG | WEIGHT: 178.8 LBS | HEIGHT: 69 IN | TEMPERATURE: 97.4 F

## 2023-04-07 DIAGNOSIS — I87.2 VENOUS INSUFFICIENCY OF LEFT LOWER EXTREMITY: Primary | ICD-10-CM

## 2023-04-07 DIAGNOSIS — L92.9 HYPERGRANULATION: ICD-10-CM

## 2023-04-07 DIAGNOSIS — I25.10 CORONARY ARTERY DISEASE INVOLVING NATIVE CORONARY ARTERY OF NATIVE HEART WITHOUT ANGINA PECTORIS: ICD-10-CM

## 2023-04-07 DIAGNOSIS — L89.154 PRESSURE ULCER OF COCCYGEAL REGION, STAGE 4 (HCC): ICD-10-CM

## 2023-04-07 PROCEDURE — 97607 NEG PRS WND THR NDME<=50SQCM: CPT

## 2023-04-07 PROCEDURE — 97597 DBRDMT OPN WND 1ST 20 CM/<: CPT

## 2023-04-07 RX ORDER — BACITRACIN ZINC AND POLYMYXIN B SULFATE 500; 1000 [USP'U]/G; [USP'U]/G
OINTMENT TOPICAL ONCE
OUTPATIENT
Start: 2023-04-07 | End: 2023-04-07

## 2023-04-07 RX ORDER — LIDOCAINE 40 MG/G
CREAM TOPICAL ONCE
OUTPATIENT
Start: 2023-04-07 | End: 2023-04-07

## 2023-04-07 RX ORDER — LIDOCAINE 50 MG/G
OINTMENT TOPICAL ONCE
OUTPATIENT
Start: 2023-04-07 | End: 2023-04-07

## 2023-04-07 RX ORDER — CLOPIDOGREL BISULFATE 75 MG/1
75 TABLET ORAL DAILY
Qty: 90 TABLET | Refills: 3 | Status: SHIPPED | OUTPATIENT
Start: 2023-04-07

## 2023-04-07 RX ORDER — LIDOCAINE 40 MG/G
CREAM TOPICAL ONCE
Status: DISCONTINUED | OUTPATIENT
Start: 2023-04-07 | End: 2023-04-08 | Stop reason: HOSPADM

## 2023-04-07 RX ORDER — LIDOCAINE HYDROCHLORIDE 40 MG/ML
SOLUTION TOPICAL ONCE
OUTPATIENT
Start: 2023-04-07 | End: 2023-04-07

## 2023-04-07 ASSESSMENT — PAIN DESCRIPTION - ONSET: ONSET: ON-GOING

## 2023-04-07 ASSESSMENT — PAIN SCALES - GENERAL: PAINLEVEL_OUTOF10: 5

## 2023-04-07 ASSESSMENT — PAIN DESCRIPTION - FREQUENCY: FREQUENCY: INTERMITTENT

## 2023-04-07 ASSESSMENT — PAIN DESCRIPTION - LOCATION: LOCATION: COCCYX

## 2023-04-07 ASSESSMENT — PAIN - FUNCTIONAL ASSESSMENT: PAIN_FUNCTIONAL_ASSESSMENT: ACTIVITIES ARE NOT PREVENTED

## 2023-04-07 ASSESSMENT — PAIN DESCRIPTION - DESCRIPTORS: DESCRIPTORS: ACHING;BURNING

## 2023-04-07 ASSESSMENT — PAIN DESCRIPTION - PAIN TYPE: TYPE: CHRONIC PAIN

## 2023-04-07 NOTE — TELEPHONE ENCOUNTER
Medication Refill    Medication needing refilled:clopidogrel (PLAVIX) 75 MG tablet      Dosage of the medication:75mg    How are you taking this medication (QD, BID, TID, QID, PRN):        Sig: Take 1 tablet by mouth daily       30 or 90 day supply called in:90    When will you run out of your medication: early next week    Which Pharmacy are we sending the medication to?:    [facility]  Address: [address]    Phone: [phone]

## 2023-04-07 NOTE — PLAN OF CARE
Patient seen in [facility] today for follow up. Patient reports she spoke with Dr. Anders Kasper office last week related to cough. He prescribed Cipro and high dose steroids for this. She is feeling better now. Wound isn't showing much change. Wound debridment per Dr. Vasile Gentile. Options discussed for long term wound care. OR debridement with follow up wound vac or palliative care discussed. Also discussed possible RAMON. Patient would like to try RAMON at this time. We will place today and assess at next visit. F/u in [facility] in 1 week as ordered, pt. Aware to call sooner with any changes or questions/concerns. Discharge instructions reviewed with patient, all questions answered, copy given to patient. Dressings were applied to all wounds per M.D. Instructions at this visit.

## 2023-04-17 NOTE — DISCHARGE INSTRUCTIONS
Appointment is with Dr. Santos Devlin in 1 week on                                              at                       .     Your nurse  is Jaycee Fernández,     If we applied slip-resistant hospital socks today, be sure to remove them at least once a day to inspect your toes or feet, even if you're not changing the wraps or dressings underneath. If you see anything concerning (redness, excess moisture, etc), please call and let us know right away. Should you experience any significant changes in your wound(s) (including redness, increased warmth, increased pain, increased drainage, odor, or fever) or have questions about your wound care, please contact the [facility] at [phone] Monday-Thursday from 8:00 am - 4:30 pm, or Friday from 8:00 am - 2:30 pm.  If you need help with your wound outside these hours and cannot wait until we are again available, contact your home-care company (if applicable), your PCP, or go to the nearest emergency room.

## 2023-04-21 ENCOUNTER — HOSPITAL ENCOUNTER (OUTPATIENT)
Dept: WOUND CARE | Age: 73
Discharge: HOME | End: 2023-04-21
Payer: MEDICARE

## 2023-04-21 VITALS
WEIGHT: 176.13 LBS | HEIGHT: 69 IN | DIASTOLIC BLOOD PRESSURE: 54 MMHG | TEMPERATURE: 97.5 F | SYSTOLIC BLOOD PRESSURE: 117 MMHG | HEART RATE: 79 BPM | BODY MASS INDEX: 26.09 KG/M2 | RESPIRATION RATE: 20 BRPM

## 2023-04-21 DIAGNOSIS — L89.154 PRESSURE ULCER OF COCCYGEAL REGION, STAGE 4 (HCC): Primary | ICD-10-CM

## 2023-04-21 DIAGNOSIS — L92.9 HYPERGRANULATION: ICD-10-CM

## 2023-04-21 DIAGNOSIS — I87.2 VENOUS INSUFFICIENCY OF LEFT LOWER EXTREMITY: ICD-10-CM

## 2023-04-21 PROCEDURE — 97597 DBRDMT OPN WND 1ST 20 CM/<: CPT

## 2023-04-21 PROCEDURE — 97607 NEG PRS WND THR NDME<=50SQCM: CPT

## 2023-04-21 RX ORDER — LIDOCAINE 50 MG/G
OINTMENT TOPICAL ONCE
OUTPATIENT
Start: 2023-04-21 | End: 2023-04-21

## 2023-04-21 RX ORDER — LIDOCAINE 40 MG/G
CREAM TOPICAL ONCE
Status: DISCONTINUED | OUTPATIENT
Start: 2023-04-21 | End: 2023-04-22 | Stop reason: HOSPADM

## 2023-04-21 RX ORDER — BACITRACIN ZINC AND POLYMYXIN B SULFATE 500; 1000 [USP'U]/G; [USP'U]/G
OINTMENT TOPICAL ONCE
OUTPATIENT
Start: 2023-04-21 | End: 2023-04-21

## 2023-04-21 RX ORDER — LIDOCAINE HYDROCHLORIDE 40 MG/ML
SOLUTION TOPICAL ONCE
OUTPATIENT
Start: 2023-04-21 | End: 2023-04-21

## 2023-04-21 RX ORDER — LIDOCAINE 40 MG/G
CREAM TOPICAL ONCE
OUTPATIENT
Start: 2023-04-21 | End: 2023-04-21

## 2023-04-21 NOTE — PLAN OF CARE
Patient seen in [facility] today for follow up. She states she feels well overall. Her wound is showing signs of improvement however is remains macerated maegan wound. Dr. Blane Urena applied betadine to help with this. Patient complains of odor emitting form wound this week. We will place Flagyl to aid in odor control. Wound debridement per Dr. Blane Urena. Patient tolerated well. Silver nitrate also applied. We will place RAMON this week. F/u in [facility] in 1 week as ordered, pt. Aware to call sooner with any changes or questions/concerns. Discharge instructions reviewed with patient, all questions answered, copy given to patient. Dressings were applied to all wounds per M.D. Instructions at this visit.
Appointment is with Dr. Ana Ramírez in 1 week on                                              at                       .     Your nurse  is Astrid Poster,     If we applied slip-resistant hospital socks today, be sure to remove them at least once a day to inspect your toes or feet, even if you're not changing the wraps or dressings underneath. If you see anything concerning (redness, excess moisture, etc), please call and let us know right away. Should you experience any significant changes in your wound(s) (including redness, increased warmth, increased pain, increased drainage, odor, or fever) or have questions about your wound care, please contact the [facility] at [phone] Monday-Thursday from 8:00 am - 4:30 pm, or Friday from 8:00 am - 2:30 pm.  If you need help with your wound outside these hours and cannot wait until we are again available, contact your home-care company (if applicable), your PCP, or go to the nearest emergency room.

## 2023-04-24 ENCOUNTER — OFFICE VISIT (OUTPATIENT)
Dept: CARDIOLOGY CLINIC | Age: 73
End: 2023-04-24
Payer: MEDICARE

## 2023-04-24 VITALS
SYSTOLIC BLOOD PRESSURE: 118 MMHG | OXYGEN SATURATION: 98 % | HEART RATE: 70 BPM | BODY MASS INDEX: 26.73 KG/M2 | WEIGHT: 178.4 LBS | DIASTOLIC BLOOD PRESSURE: 60 MMHG

## 2023-04-24 DIAGNOSIS — E78.2 MIXED HYPERLIPIDEMIA: ICD-10-CM

## 2023-04-24 DIAGNOSIS — I50.22 CHRONIC SYSTOLIC CONGESTIVE HEART FAILURE (HCC): Primary | ICD-10-CM

## 2023-04-24 DIAGNOSIS — I25.10 CORONARY ARTERY DISEASE INVOLVING NATIVE CORONARY ARTERY OF NATIVE HEART WITHOUT ANGINA PECTORIS: ICD-10-CM

## 2023-04-24 PROCEDURE — 3078F DIAST BP <80 MM HG: CPT | Performed by: NURSE PRACTITIONER

## 2023-04-24 PROCEDURE — 1123F ACP DISCUSS/DSCN MKR DOCD: CPT | Performed by: NURSE PRACTITIONER

## 2023-04-24 PROCEDURE — 99214 OFFICE O/P EST MOD 30 MIN: CPT | Performed by: NURSE PRACTITIONER

## 2023-04-24 PROCEDURE — 3074F SYST BP LT 130 MM HG: CPT | Performed by: NURSE PRACTITIONER

## 2023-04-24 NOTE — PROGRESS NOTES
BY MOUTH TWICE DAILY AS NEEDED 4/26/22   Historical Provider, MD   DULoxetine (CYMBALTA) 60 MG extended release capsule Take 60 mg by mouth 2 times daily 3/25/22   Cynthia Provider, MD   metoprolol succinate (TOPROL XL) 25 MG extended release tablet Take 0.5 tablets by mouth daily 2/22/22   Blank Florez MD   oxyCODONE-acetaminophen (PERCOCET)  MG per tablet Take 1 tablet by mouth daily. 1/11/22   Historical Provider, MD   docusate sodium (COLACE) 100 MG capsule Take 100 mg by mouth 2 times daily as needed for Constipation    Historical Provider, MD   gabapentin (NEURONTIN) 300 MG capsule Take 300 mg by mouth in the morning and at bedtime. Historical Provider, MD   latanoprost (XALATAN) 0.005 % ophthalmic solution Place 1 drop into both eyes nightly    Historical Provider, MD   NARCAN 4 MG/0.1ML LIQD nasal spray as needed 10/20/20   Historical Provider, MD   morphine (MS CONTIN) 15 MG extended release tablet 15 mg 2 times daily. 10/16/20   Historical Provider, MD   ipratropium (ATROVENT) 0.06 % nasal spray USE 2 SPRAYS BY NASAL ROUTE 2-4 TIMES DAILY  Patient taking differently: 3 times daily as needed 10/29/20   Missy Sanchez MD   vitamin D (CHOLECALCIFEROL) 1000 UNIT TABS tablet Take 5,000 Units by mouth daily     Historical Provider, MD   calcium carbonate (OSCAL) 500 MG TABS tablet Take 500 mg by mouth daily    Historical Provider, MD   atorvastatin (LIPITOR) 40 MG tablet Take 40 mg by mouth daily 10/16/18   Cynthia Provider, MD   insulin lispro (HUMALOG) 100 UNIT/ML injection vial Inject 0-12 Units into the skin 3 times daily (with meals) 10/23/17   Harvinder Piper MD   fluticasone (FLONASE) 50 MCG/ACT nasal spray INHALE 2 SPRAYS IN [facility] NOSTRIL DAILY 11/25/13   Missy Sanchez MD   cetirizine (ZYRTEC) 10 MG tablet Take 10 mg by mouth daily.       Historical Provider, MD   omeprazole (PRILOSEC) 40 MG capsule Take 40 mg by mouth daily     Historical Provider, MD     Allergies:  Atenolol    Social

## 2023-04-24 NOTE — PATIENT INSTRUCTIONS
Everything looks great today, good job!   Continue current medications  Elevate legs, compression stockings, salt <2 grams, fluids 64 ounces  Follow up as planned with Dr. Kaiser Ordonez

## 2023-04-24 NOTE — DISCHARGE INSTRUCTIONS
[address] [facility] Physician Orders and Discharge [address], [address]  [place], [facility]  Telephone: [phone]      Fax: [phone]        Your home care company:   [facility]         Your wound-care supplies will be provided by: [facility] AT [facility] orders supplies through [employer]. Please note, depending on your insurance coverage, you may have out-of-pocket expenses for these supplies. Someone from [place] may call you to confirm your order and discuss those potential costs before they ship your products -- please anticipate that call. If your out-of-pocket cost is going to be less than $200, and [facility] cannot reach you, they may ship your supplies as soon as the order is processed, and will send you a bill. If your out-of-pocket cost is going to be more than $200, [facility] should not ship your supplies until they speak with you. If you have any questions about your supplies or your potential out-of-pocket costs, or if you need to place an order for a refill of supplies (typically monthly), Constance Skaggs is your local representative, and can be reached at [phone]. Information on [facility]'s return policy will also be enclosed with your supplies -- please read that carefully before opening your items. NAME:  Jordan Taveras   YOB: 1950  PRIMARY DIAGNOSIS FOR WOUND CARE CENTER:  Pressure ulcer     Wound cleansing:  Do not scrub or use excessive force. Wash hands with soap and water before and after dressing changes. Prior to applying a clean dressing, cleanse wound with normal saline, wound cleanser, or mild soap and water. Ask your physician or nurse before getting the wound(s) wet in the shower.                 Wound care for home:      Sacral Wound:  Generous amount of Betadine to maegan wound   Thin rim of Calmoseptine to macerated area  Lotrisone to peripheral rash  Silver Alginate tucked in to wound - please provide patient with

## 2023-04-25 ASSESSMENT — ENCOUNTER SYMPTOMS: SHORTNESS OF BREATH: 0

## 2023-04-28 ENCOUNTER — HOSPITAL ENCOUNTER (OUTPATIENT)
Dept: WOUND CARE | Age: 73
Discharge: HOME | End: 2023-04-28
Payer: MEDICARE

## 2023-04-28 VITALS
SYSTOLIC BLOOD PRESSURE: 102 MMHG | DIASTOLIC BLOOD PRESSURE: 49 MMHG | TEMPERATURE: 98.4 F | WEIGHT: 177.4 LBS | HEART RATE: 69 BPM | RESPIRATION RATE: 18 BRPM | HEIGHT: 69 IN | BODY MASS INDEX: 26.28 KG/M2

## 2023-04-28 DIAGNOSIS — L92.9 HYPERGRANULATION: ICD-10-CM

## 2023-04-28 DIAGNOSIS — I87.2 VENOUS INSUFFICIENCY OF LEFT LOWER EXTREMITY: Primary | ICD-10-CM

## 2023-04-28 DIAGNOSIS — L89.154 PRESSURE ULCER OF COCCYGEAL REGION, STAGE 4 (HCC): ICD-10-CM

## 2023-04-28 PROCEDURE — 97597 DBRDMT OPN WND 1ST 20 CM/<: CPT

## 2023-04-28 RX ORDER — LIDOCAINE HYDROCHLORIDE 40 MG/ML
SOLUTION TOPICAL ONCE
OUTPATIENT
Start: 2023-04-28 | End: 2023-04-28

## 2023-04-28 RX ORDER — PREDNISONE 1 MG/1
4 TABLET ORAL DAILY
COMMUNITY

## 2023-04-28 RX ORDER — LIDOCAINE 40 MG/G
CREAM TOPICAL ONCE
Status: DISCONTINUED | OUTPATIENT
Start: 2023-04-28 | End: 2023-04-29 | Stop reason: HOSPADM

## 2023-04-28 RX ORDER — LIDOCAINE 40 MG/G
CREAM TOPICAL ONCE
OUTPATIENT
Start: 2023-04-28 | End: 2023-04-28

## 2023-04-28 RX ORDER — BACITRACIN ZINC AND POLYMYXIN B SULFATE 500; 1000 [USP'U]/G; [USP'U]/G
OINTMENT TOPICAL ONCE
OUTPATIENT
Start: 2023-04-28 | End: 2023-04-28

## 2023-04-28 RX ORDER — LIDOCAINE 50 MG/G
OINTMENT TOPICAL ONCE
OUTPATIENT
Start: 2023-04-28 | End: 2023-04-28

## 2023-04-28 NOTE — PLAN OF CARE
Patient seen in [facility] today for follow up. Her wound is showing signs of improvement. She was able to tolerate the RAMON until Monday. Removed it at this time but placed a new one on Tuesday. Blanca wound with some irritation noted. Will hold RAMON this week. Wound debridement per Dr. Lina Bingham. Patient tolerated well. F/u in [facility] in 1 week as ordered, pt. Aware to call sooner with any changes or questions/concerns. Discharge instructions reviewed with patient, all questions answered, copy given to patient. Dressings were applied to all wounds per M.D. Instructions at this visit.

## 2023-04-28 NOTE — PLAN OF CARE
215 [facility] Physician Orders and Discharge [address], [address]  [place], [facility]  Telephone: [phone]      Fax: [phone]        Your home care company:   [facility]         Your wound-care supplies will be provided by: [facility] AT [facility] orders supplies through [employer]. Please note, depending on your insurance coverage, you may have out-of-pocket expenses for these supplies. Someone from [place] may call you to confirm your order and discuss those potential costs before they ship your products -- please anticipate that call. If your out-of-pocket cost is going to be less than $200, and [facility] cannot reach you, they may ship your supplies as soon as the order is processed, and will send you a bill. If your out-of-pocket cost is going to be more than $200, [facility] should not ship your supplies until they speak with you. If you have any questions about your supplies or your potential out-of-pocket costs, or if you need to place an order for a refill of supplies (typically monthly), Luis Felipe Brunson is your local representative, and can be reached at [phone]. Information on [employer]'s return policy will also be enclosed with your supplies -- please read that carefully before opening your items. NAME:  Ester Taveras   YOB: 1950  PRIMARY DIAGNOSIS FOR WOUND CARE CENTER:  Pressure ulcer     Wound cleansing:  Do not scrub or use excessive force. Wash hands with soap and water before and after dressing changes. Prior to applying a clean dressing, cleanse wound with normal saline, wound cleanser, or mild soap and water. Ask your physician or nurse before getting the wound(s) wet in the shower.                 Wound care for home:      Sacral Wound:  Generous amount of Betadine to maegan wound   Thin rim of Calmoseptine to macerated area  Lotrisone to peripheral rash  Silver Alginate tucked in to wound - please provide patient with

## 2023-05-01 PROBLEM — B37.2 CANDIDAL DERMATITIS: Status: ACTIVE | Noted: 2023-05-01

## 2023-05-01 NOTE — DISCHARGE INSTRUCTIONS
215 [facility] Physician Orders and Discharge [address], [address]  [place], [facility]  Telephone: [phone]      Fax: [phone]        Your home care company:   [facility]         Your wound-care supplies will be provided by: [employer] orders supplies through [employer]. Please note, depending on your insurance coverage, you may have out-of-pocket expenses for these supplies. Someone from [place] may call you to confirm your order and discuss those potential costs before they ship your products -- please anticipate that call. If your out-of-pocket cost is going to be less than $200, and [place] cannot reach you, they may ship your supplies as soon as the order is processed, and will send you a bill. If your out-of-pocket cost is going to be more than $200, [facility] should not ship your supplies until they speak with you. If you have any questions about your supplies or your potential out-of-pocket costs, or if you need to place an order for a refill of supplies (typically monthly), Mirella Guerrero is your local representative, and can be reached at [phone]. Information on [employer]s return policy will also be enclosed with your supplies -- please read that carefully before opening your items. NAME:  Lore Taveras   YOB: 1950  PRIMARY DIAGNOSIS FOR WOUND CARE CENTER:  Pressure ulcer     Wound cleansing:  Do not scrub or use excessive force. Wash hands with soap and water before and after dressing changes. Prior to applying a clean dressing, cleanse wound with normal saline, wound cleanser, or mild soap and water. Ask your physician or nurse before getting the wound(s) wet in the shower.                 Wound care for home:      Sacral Wound:  Vashe or Nexodyn   Generous amount of Betadine to maegan wound   Thin rim of Calmoseptine to macerated area  Flagyl  Silver Alginate to wound - please provide patient with remaining  Skin

## 2023-05-05 ENCOUNTER — HOSPITAL ENCOUNTER (OUTPATIENT)
Dept: WOUND CARE | Age: 73
Discharge: HOME | End: 2023-05-05
Payer: MEDICARE

## 2023-05-05 VITALS
HEART RATE: 80 BPM | BODY MASS INDEX: 26.07 KG/M2 | TEMPERATURE: 97 F | RESPIRATION RATE: 18 BRPM | DIASTOLIC BLOOD PRESSURE: 53 MMHG | HEIGHT: 69 IN | SYSTOLIC BLOOD PRESSURE: 90 MMHG | WEIGHT: 176 LBS

## 2023-05-05 DIAGNOSIS — I87.2 VENOUS INSUFFICIENCY OF LEFT LOWER EXTREMITY: Primary | ICD-10-CM

## 2023-05-05 DIAGNOSIS — L89.154 PRESSURE ULCER OF COCCYGEAL REGION, STAGE 4 (HCC): ICD-10-CM

## 2023-05-05 DIAGNOSIS — L92.9 HYPERGRANULATION: ICD-10-CM

## 2023-05-05 PROCEDURE — 97607 NEG PRS WND THR NDME<=50SQCM: CPT

## 2023-05-05 PROCEDURE — 17250 CHEM CAUT OF GRANLTJ TISSUE: CPT

## 2023-05-05 RX ORDER — LIDOCAINE 40 MG/G
CREAM TOPICAL ONCE
Status: DISCONTINUED | OUTPATIENT
Start: 2023-05-05 | End: 2023-05-06 | Stop reason: HOSPADM

## 2023-05-05 RX ORDER — LIDOCAINE HYDROCHLORIDE 40 MG/ML
SOLUTION TOPICAL ONCE
OUTPATIENT
Start: 2023-05-05 | End: 2023-05-05

## 2023-05-05 RX ORDER — BACITRACIN ZINC AND POLYMYXIN B SULFATE 500; 1000 [USP'U]/G; [USP'U]/G
OINTMENT TOPICAL ONCE
OUTPATIENT
Start: 2023-05-05 | End: 2023-05-05

## 2023-05-05 RX ORDER — LIDOCAINE 40 MG/G
CREAM TOPICAL ONCE
OUTPATIENT
Start: 2023-05-05 | End: 2023-05-05

## 2023-05-05 RX ORDER — LIDOCAINE 50 MG/G
OINTMENT TOPICAL ONCE
OUTPATIENT
Start: 2023-05-05 | End: 2023-05-05

## 2023-05-05 NOTE — PLAN OF CARE
215 [facility] Physician Orders and Discharge [address], [address]  [place], [facility]  Telephone: [phone]      Fax: [phone]        Your home care company:   [facility]         Your wound-care supplies will be provided by: [employer] orders supplies through [employer]. Please note, depending on your insurance coverage, you may have out-of-pocket expenses for these supplies. Someone from [place] may call you to confirm your order and discuss those potential costs before they ship your products -- please anticipate that call. If your out-of-pocket cost is going to be less than $200, and [facility] cannot reach you, they may ship your supplies as soon as the order is processed, and will send you a bill. If your out-of-pocket cost is going to be more than $200, [facility] should not ship your supplies until they speak with you. If you have any questions about your supplies or your potential out-of-pocket costs, or if you need to place an order for a refill of supplies (typically monthly), Inez Jeans is your local representative, and can be reached at [phone]. Information on [employer]'s return policy will also be enclosed with your supplies -- please read that carefully before opening your items. NAME:  Greg Taveras   YOB: 1950  PRIMARY DIAGNOSIS FOR WOUND CARE CENTER:  Pressure ulcer     Wound cleansing:  Do not scrub or use excessive force. Wash hands with soap and water before and after dressing changes. Prior to applying a clean dressing, cleanse wound with normal saline, wound cleanser, or mild soap and water. Ask your physician or nurse before getting the wound(s) wet in the shower.                 Wound care for home:      Sacral Wound:  Vashe  Generous amount of Betadine to maegan wound   Thin rim of Calmoseptine to macerated area  Flagyl  Silver Alginate to wound - please provide patient with remaining  Skin Prep  Tali Jones -

## 2023-05-05 NOTE — PLAN OF CARE
Pt seen in [address] [place] [address],[address] - Wound unchanged - debride per Dr. James Houston - treatment as follows   Sacral Wound:  Vashe or Nexodyn   Generous amount of Betadine to maegan wound   Thin rim of Calmoseptine to macerated area  Flagyl  Silver Alginate to wound - please provide patient with remaining  Skin Prep   Restart RAMON NPWT - please direct towards one hip or the other if possible  - give extra kit to pt    Reviewed AVS - f/u in 1 week

## 2023-05-08 NOTE — DISCHARGE INSTRUCTIONS
215 [facility] Physician Orders and Discharge 800 [facility]  [address], [address]  [place], [facility]  Telephone: [phone]      Fax: [phone]        Your home care company:   [facility]         Your wound-care supplies will be provided by: [employer] orders supplies through [employer]. Please note, depending on your insurance coverage, you may have out-of-pocket expenses for these supplies. Someone from [place] may call you to confirm your order and discuss those potential costs before they ship your products -- please anticipate that call. If your out-of-pocket cost is going to be less than $200, and [facility] cannot reach you, they may ship your supplies as soon as the order is processed, and will send you a bill. If your out-of-pocket cost is going to be more than $200, [facility] should not ship your supplies until they speak with you. If you have any questions about your supplies or your potential out-of-pocket costs, or if you need to place an order for a refill of supplies (typically monthly), Elvis Theodore is your local representative, and can be reached at [phone]. Information on [employer]'s return policy will also be enclosed with your supplies -- please read that carefully before opening your items. NAME:  Fidencio Taveras   YOB: 1950  PRIMARY DIAGNOSIS FOR WOUND CARE CENTER:  Pressure ulcer     Wound cleansing:  Do not scrub or use excessive force. Wash hands with soap and water before and after dressing changes. Prior to applying a clean dressing, cleanse wound with normal saline, wound cleanser, or mild soap and water. Ask your physician or nurse before getting the wound(s) wet in the shower.                 Wound care for home:      Sacral Wound:  Vashe or Nexodyn   Generous amount of Betadine to maegan wound   Thin rim of Calmoseptine to macerated area  Flagyl  Silver Alginate to wound - please provide patient with remaining  Skin

## 2023-05-12 ENCOUNTER — HOSPITAL ENCOUNTER (OUTPATIENT)
Dept: WOUND CARE | Age: 73
Discharge: HOME | End: 2023-05-12
Payer: MEDICARE

## 2023-05-12 VITALS
SYSTOLIC BLOOD PRESSURE: 108 MMHG | TEMPERATURE: 98.3 F | RESPIRATION RATE: 18 BRPM | BODY MASS INDEX: 25.8 KG/M2 | DIASTOLIC BLOOD PRESSURE: 63 MMHG | HEART RATE: 83 BPM | WEIGHT: 174.2 LBS | HEIGHT: 69 IN

## 2023-05-12 DIAGNOSIS — L92.9 HYPERGRANULATION: ICD-10-CM

## 2023-05-12 DIAGNOSIS — I87.2 VENOUS INSUFFICIENCY OF LEFT LOWER EXTREMITY: Primary | ICD-10-CM

## 2023-05-12 DIAGNOSIS — L89.154 PRESSURE ULCER OF COCCYGEAL REGION, STAGE 4 (HCC): ICD-10-CM

## 2023-05-12 PROCEDURE — 17250 CHEM CAUT OF GRANLTJ TISSUE: CPT

## 2023-05-12 PROCEDURE — 97607 NEG PRS WND THR NDME<=50SQCM: CPT

## 2023-05-12 RX ORDER — BACITRACIN ZINC AND POLYMYXIN B SULFATE 500; 1000 [USP'U]/G; [USP'U]/G
OINTMENT TOPICAL ONCE
OUTPATIENT
Start: 2023-05-12 | End: 2023-05-12

## 2023-05-12 RX ORDER — LIDOCAINE 40 MG/G
CREAM TOPICAL ONCE
Status: DISCONTINUED | OUTPATIENT
Start: 2023-05-12 | End: 2023-05-13 | Stop reason: HOSPADM

## 2023-05-12 RX ORDER — LIDOCAINE HYDROCHLORIDE 40 MG/ML
SOLUTION TOPICAL ONCE
OUTPATIENT
Start: 2023-05-12 | End: 2023-05-12

## 2023-05-12 RX ORDER — LIDOCAINE 40 MG/G
CREAM TOPICAL ONCE
OUTPATIENT
Start: 2023-05-12 | End: 2023-05-12

## 2023-05-12 RX ORDER — LIDOCAINE 50 MG/G
OINTMENT TOPICAL ONCE
OUTPATIENT
Start: 2023-05-12 | End: 2023-05-12

## 2023-05-12 NOTE — PLAN OF CARE
Follow up visit today in [facility]. Pt's granddaughter Yoly Nunez at bedside. Wound stable and showing signs of improvement. Silver nitrate per Dr. Anil Graham. To continue RAMON NPWT. Follow up in [address] [place] [address] in 1 week as ordered. Pt. Aware to call sooner with any problems or questions/concerns. MD orders/D/C instructions reviewed with patient, all questions answered; copy of instructions given to patient.

## 2023-05-15 NOTE — DISCHARGE INSTRUCTIONS
right away. Should you experience any significant changes in your wound(s) (including redness, increased warmth, increased pain, increased drainage, odor, or fever) or have questions about your wound care, please contact the [place] [address] at [phone] Monday-Thursday from 8:00 am - 4:30 pm, or Friday from 8:00 am - 2:30 pm.  If you need help with your wound outside these hours and cannot wait until we are again available, contact your home-care company (if applicable), your PCP, or go to the nearest emergency room.

## 2023-05-16 ENCOUNTER — OFFICE VISIT (OUTPATIENT)
Dept: PULMONOLOGY | Age: 73
End: 2023-05-16
Payer: MEDICARE

## 2023-05-16 VITALS — SYSTOLIC BLOOD PRESSURE: 104 MMHG | HEART RATE: 78 BPM | DIASTOLIC BLOOD PRESSURE: 54 MMHG | OXYGEN SATURATION: 95 %

## 2023-05-16 DIAGNOSIS — J44.1 COPD EXACERBATION (HCC): Primary | ICD-10-CM

## 2023-05-16 PROCEDURE — 99214 OFFICE O/P EST MOD 30 MIN: CPT | Performed by: INTERNAL MEDICINE

## 2023-05-16 PROCEDURE — 1123F ACP DISCUSS/DSCN MKR DOCD: CPT | Performed by: INTERNAL MEDICINE

## 2023-05-16 PROCEDURE — 3078F DIAST BP <80 MM HG: CPT | Performed by: INTERNAL MEDICINE

## 2023-05-16 PROCEDURE — 3074F SYST BP LT 130 MM HG: CPT | Performed by: INTERNAL MEDICINE

## 2023-05-16 RX ORDER — FLUTICASONE FUROATE, UMECLIDINIUM BROMIDE AND VILANTEROL TRIFENATATE 200; 62.5; 25 UG/1; UG/1; UG/1
1 POWDER RESPIRATORY (INHALATION) DAILY
Qty: 1 EACH | Refills: 5 | COMMUNITY
Start: 2023-05-16

## 2023-05-16 RX ORDER — FLUTICASONE FUROATE, UMECLIDINIUM BROMIDE AND VILANTEROL TRIFENATATE 200; 62.5; 25 UG/1; UG/1; UG/1
1 POWDER RESPIRATORY (INHALATION) DAILY
Qty: 1 EACH | Refills: 3 | Status: SHIPPED | OUTPATIENT
Start: 2023-05-16

## 2023-05-16 RX ORDER — ALBUTEROL SULFATE 90 UG/1
2 AEROSOL, METERED RESPIRATORY (INHALATION) EVERY 6 HOURS PRN
Qty: 18 G | Refills: 11 | Status: SHIPPED | OUTPATIENT
Start: 2023-05-16

## 2023-05-16 RX ORDER — ROFLUMILAST 500 UG/1
500 TABLET ORAL DAILY
Qty: 90 TABLET | Refills: 3 | Status: SHIPPED | OUTPATIENT
Start: 2023-05-16

## 2023-05-16 RX ORDER — LEVOFLOXACIN 500 MG/1
TABLET, FILM COATED ORAL
Qty: 7 TABLET | Refills: 0 | Status: SHIPPED | OUTPATIENT
Start: 2023-05-16

## 2023-05-16 RX ORDER — PREDNISONE 20 MG/1
40 TABLET ORAL DAILY
Qty: 10 TABLET | Refills: 0 | Status: SHIPPED | OUTPATIENT
Start: 2023-05-16 | End: 2023-05-21

## 2023-05-16 NOTE — PROGRESS NOTES
PULMONARY, CRITICAL CARE AND SLEEP MEDICINE   CC: Cough    Interval History 5/16/23: Hospitalized in February with pneumonia, sick call end of March, rx ceftin/prednisone. She was better on prednisone but now with more cough, deep, sometimes productive. Still on daliresp. Gets infusion for RA     Previous history: 61 yo with 2 month h/o severe waxing/waning cough, tx'd with avelox w/o improvement & then changed to Zpac, some improvement; then treated with prednisone & Zpac & then clearly better, then returned & retreated with cough medicine & prednisone. No cough prior to 3 months ago except with sinus drainage. Cough has slowly been improving since last visit here. DATA:   Blood pressure (!) 104/54, pulse 78, SpO2 95 %. 'on RA  Constitutional:  No acute distress. HENT:  Oropharynx is clear and moist.   Neck: No tracheal deviation present. Cardiovascular: Normal heart sounds. No lower extremity edema. Pulmonary/Chest: few wheezes. No rhonchi. No rales. No decreased breath sounds. No accessory muscle usage or stridor. Musculoskeletal: No cyanosis. No clubbing. Skin: Skin is warm and dry. Psychiatric: Normal mood and affect.   Neurologic: speech fluent, alert and oriented, strength symmetric        DATA:   PFT 1/2012   FEV1 2.2 L 77% nl ratio TLC 84% DLCO 18.29 88%  PFT 4/22/2013 FEV1 2.26 L 70%  TLC 92% DLCO 19.08 69%  PFT Mar 2014  FEV1 2.08 L 65%  TLC 5.63 88% DLCO 20.05 73%  PFT June 2014 FEV1 2.12 L 69%  TLC 6.14 99% DLCO 18.31 68%  PFT April 2016 FEV1 2.24 L FVC 3.2 L TLC 6.36 l DLCO 19.16 72%    Pertussis antibodies are positive    CPAP titration 8/20/19 CPAP 7  PSG 7/17/19 AHI 8, but AHI 46 with REM    CABG 5/3/2021   Urgent coronary bypass grafting surgery x3 with single greater saphenous vein graft to the posterior ventricular branch of the right coronary artery, separate single greater saphenous vein graft to the second obtuse marginal branch of circumflex, pedicled left internal artery to

## 2023-05-16 NOTE — PATIENT INSTRUCTIONS
Levaquin 500 X 7 days and prednisone 40 mg X 5 days, should notice improvement   Resume azithromycin daily only after finishing levaquin   2 week trial of trelegy 200 one inhalation daily, then fill the prescription as long as doing okay with trelegy   Continue [place]  F/U will be with me in 4 months with full PFT

## 2023-05-19 ENCOUNTER — HOSPITAL ENCOUNTER (OUTPATIENT)
Dept: WOUND CARE | Age: 73
Discharge: HOME | End: 2023-05-19
Payer: MEDICARE

## 2023-05-19 VITALS
WEIGHT: 178.6 LBS | RESPIRATION RATE: 18 BRPM | SYSTOLIC BLOOD PRESSURE: 124 MMHG | DIASTOLIC BLOOD PRESSURE: 69 MMHG | TEMPERATURE: 98.3 F | BODY MASS INDEX: 27.07 KG/M2 | HEIGHT: 68 IN | HEART RATE: 88 BPM

## 2023-05-19 DIAGNOSIS — L92.9 HYPERGRANULATION: ICD-10-CM

## 2023-05-19 DIAGNOSIS — L89.154 PRESSURE ULCER OF COCCYGEAL REGION, STAGE 4 (HCC): ICD-10-CM

## 2023-05-19 DIAGNOSIS — I87.2 VENOUS INSUFFICIENCY OF LEFT LOWER EXTREMITY: Primary | ICD-10-CM

## 2023-05-19 PROCEDURE — 17250 CHEM CAUT OF GRANLTJ TISSUE: CPT

## 2023-05-19 RX ORDER — LIDOCAINE 40 MG/G
CREAM TOPICAL ONCE
Status: DISCONTINUED | OUTPATIENT
Start: 2023-05-19 | End: 2023-05-20 | Stop reason: HOSPADM

## 2023-05-19 RX ORDER — BACITRACIN ZINC AND POLYMYXIN B SULFATE 500; 1000 [USP'U]/G; [USP'U]/G
OINTMENT TOPICAL ONCE
OUTPATIENT
Start: 2023-05-19 | End: 2023-05-19

## 2023-05-19 RX ORDER — LIDOCAINE 40 MG/G
CREAM TOPICAL ONCE
OUTPATIENT
Start: 2023-05-19 | End: 2023-05-19

## 2023-05-19 RX ORDER — LIDOCAINE 50 MG/G
OINTMENT TOPICAL ONCE
OUTPATIENT
Start: 2023-05-19 | End: 2023-05-19

## 2023-05-19 RX ORDER — LIDOCAINE HYDROCHLORIDE 40 MG/ML
SOLUTION TOPICAL ONCE
OUTPATIENT
Start: 2023-05-19 | End: 2023-05-19

## 2023-05-19 ASSESSMENT — PAIN SCALES - GENERAL: PAINLEVEL_OUTOF10: 0

## 2023-05-19 NOTE — PLAN OF CARE
Patient seen in [facility] today for follow up. She reports Dr. Conchita Reyna started her on steroids for chronic cough. BG elevated at today's visit r//t this. Wound showing signs of improvement. Some redness and maceration noted. Silver nitrate applied per Dr. Rainer Elizabeth. Patient tolerated well. F/u in [facility] in 1 week as ordered, pt. Aware to call sooner with any changes or questions/concerns. Discharge instructions reviewed with patient, all questions answered, copy given to patient. Dressings were applied to all wounds per M.D. Instructions at this visit.

## 2023-05-19 NOTE — PLAN OF CARE
215 [facility] Physician Orders and Discharge [address], [address]  [place], [facility]  Telephone: [phone]      Fax: [phone]        Your home care company:   [facility]         Your wound-care supplies will be provided by: [employer] orders supplies through [employer]. Please note, depending on your insurance coverage, you may have out-of-pocket expenses for these supplies. Someone from [place] may call you to confirm your order and discuss those potential costs before they ship your products -- please anticipate that call. If your out-of-pocket cost is going to be less than $200, and [place] cannot reach you, they may ship your supplies as soon as the order is processed, and will send you a bill. If your out-of-pocket cost is going to be more than $200, [facility] should not ship your supplies until they speak with you. If you have any questions about your supplies or your potential out-of-pocket costs, or if you need to place an order for a refill of supplies (typically monthly), Nicolas Mckenzie is your local representative, and can be reached at [phone]. Information on [facility]'s return policy will also be enclosed with your supplies -- please read that carefully before opening your items. NAME:  Dex Taveras   YOB: 1950  PRIMARY DIAGNOSIS FOR WOUND CARE CENTER:  Pressure ulcer     Wound cleansing:  Do not scrub or use excessive force. Wash hands with soap and water before and after dressing changes. Prior to applying a clean dressing, cleanse wound with normal saline, wound cleanser, or mild soap and water. Ask your physician or nurse before getting the wound(s) wet in the shower.                 Wound care for home:      Sacral Wound:  Generous amount of Betadine and Calmoseptine to wound and maegan wound  Lotrisone to peripheral red rash   Silver Alginate to wound - please provide patient with remaining  Mepilex

## 2023-05-22 NOTE — DISCHARGE INSTRUCTIONS
call and let us know right away. Should you experience any significant changes in your wound(s) (including redness, increased warmth, increased pain, increased drainage, odor, or fever) or have questions about your wound care, please contact the [place] [address] at [phone] Monday-Thursday from 8:00 am - 4:30 pm, or Friday from 8:00 am - 2:30 pm.  If you need help with your wound outside these hours and cannot wait until we are again available, contact your home-care company (if applicable), your PCP, or go to the nearest emergency room.

## 2023-05-25 ENCOUNTER — TELEPHONE (OUTPATIENT)
Dept: PULMONOLOGY | Age: 73
End: 2023-05-25

## 2023-05-25 DIAGNOSIS — J47.1 BRONCHIECTASIS WITH ACUTE EXACERBATION (HCC): Primary | ICD-10-CM

## 2023-05-25 DIAGNOSIS — J44.9 CHRONIC OBSTRUCTIVE PULMONARY DISEASE, UNSPECIFIED COPD TYPE (HCC): ICD-10-CM

## 2023-05-25 RX ORDER — PREDNISONE 10 MG/1
TABLET ORAL
Qty: 30 TABLET | Refills: 0 | Status: SHIPPED | OUTPATIENT
Start: 2023-05-25 | End: 2023-06-02 | Stop reason: ALTCHOICE

## 2023-05-25 RX ORDER — DOXYCYCLINE HYCLATE 100 MG
100 TABLET ORAL 2 TIMES DAILY
Qty: 14 TABLET | Refills: 0 | Status: SHIPPED | OUTPATIENT
Start: 2023-05-25 | End: 2023-05-26

## 2023-05-26 ENCOUNTER — HOSPITAL ENCOUNTER (OUTPATIENT)
Dept: WOUND CARE | Age: 73
Discharge: HOME | End: 2023-05-26
Payer: MEDICARE

## 2023-05-26 VITALS
BODY MASS INDEX: 26.92 KG/M2 | SYSTOLIC BLOOD PRESSURE: 116 MMHG | TEMPERATURE: 97.6 F | DIASTOLIC BLOOD PRESSURE: 67 MMHG | HEIGHT: 68 IN | RESPIRATION RATE: 18 BRPM | HEART RATE: 88 BPM | WEIGHT: 177.6 LBS

## 2023-05-26 DIAGNOSIS — L92.9 HYPERGRANULATION: ICD-10-CM

## 2023-05-26 DIAGNOSIS — L89.154 PRESSURE ULCER OF COCCYGEAL REGION, STAGE 4 (HCC): ICD-10-CM

## 2023-05-26 DIAGNOSIS — I87.2 VENOUS INSUFFICIENCY OF LEFT LOWER EXTREMITY: Primary | ICD-10-CM

## 2023-05-26 PROCEDURE — 87070 CULTURE OTHR SPECIMN AEROBIC: CPT

## 2023-05-26 PROCEDURE — 99213 OFFICE O/P EST LOW 20 MIN: CPT

## 2023-05-26 PROCEDURE — 87205 SMEAR GRAM STAIN: CPT

## 2023-05-26 RX ORDER — LIDOCAINE HYDROCHLORIDE 40 MG/ML
SOLUTION TOPICAL ONCE
OUTPATIENT
Start: 2023-05-26 | End: 2023-05-26

## 2023-05-26 RX ORDER — BACITRACIN ZINC AND POLYMYXIN B SULFATE 500; 1000 [USP'U]/G; [USP'U]/G
OINTMENT TOPICAL ONCE
OUTPATIENT
Start: 2023-05-26 | End: 2023-05-26

## 2023-05-26 RX ORDER — LIDOCAINE 40 MG/G
CREAM TOPICAL ONCE
OUTPATIENT
Start: 2023-05-26 | End: 2023-05-26

## 2023-05-26 RX ORDER — LIDOCAINE 40 MG/G
CREAM TOPICAL ONCE
Status: DISCONTINUED | OUTPATIENT
Start: 2023-05-26 | End: 2023-05-27 | Stop reason: HOSPADM

## 2023-05-26 RX ORDER — LIDOCAINE 50 MG/G
OINTMENT TOPICAL ONCE
OUTPATIENT
Start: 2023-05-26 | End: 2023-05-26

## 2023-05-26 NOTE — PLAN OF CARE
[facility]. She reports feeling well. Continues on steroids for persistent cough per Dr. Phillips Showhelen. Rash maegan wound is healed. Wound appears to be nearly healed. No debridement needed. No change in plan of care this week. F[facility]u in [facility] in 1 week as ordered, pt. Aware to call sooner with any changes or questions/concerns.
call and let us know right away. Should you experience any significant changes in your wound(s) (including redness, increased warmth, increased pain, increased drainage, odor, or fever) or have questions about your wound care, please contact the [place] [address] at [phone] Monday-Thursday from 8:00 am - 4:30 pm, or Friday from 8:00 am - 2:30 pm.  If you need help with your wound outside these hours and cannot wait until we are again available, contact your home-care company (if applicable), your PCP, or go to the nearest emergency room.

## 2023-05-28 LAB
BACTERIA SPEC RESP CULT: NORMAL
GRAM STN SPEC: NORMAL

## 2023-05-29 LAB
BACTERIA SPEC RESP CULT: NORMAL
GRAM STN SPEC: NORMAL

## 2023-06-01 NOTE — DISCHARGE INSTRUCTIONS
1909 [facility] Physician Orders and Discharge 800 [facility]  [address], [address]  [place], [facility]  Telephone: [phone]      Fax: [phone]        Your home care company:   [facility]         Your wound-care supplies will be provided by: [employer] orders supplies through [employer]. Please note, depending on your insurance coverage, you may have out-of-pocket expenses for these supplies. Someone from [place] may call you to confirm your order and discuss those potential costs before they ship your products -- please anticipate that call. If your out-of-pocket cost is going to be less than $200, and [facility] cannot reach you, they may ship your supplies as soon as the order is processed, and will send you a bill. If your out-of-pocket cost is going to be more than $200, [place] should not ship your supplies until they speak with you. If you have any questions about your supplies or your potential out-of-pocket costs, or if you need to place an order for a refill of supplies (typically monthly), Narda Walsh is your local representative, and can be reached at [phone]. Information on [facility]'s return policy will also be enclosed with your supplies -- please read that carefully before opening your items. NAME:  Loretta Cabot Pegan   YOB: 1950  PRIMARY DIAGNOSIS FOR WOUND CARE CENTER:  Pressure ulcer     Wound cleansing:  Do not scrub or use excessive force. Wash hands with soap and water before and after dressing changes. Prior to applying a clean dressing, cleanse wound with normal saline, wound cleanser, or mild soap and water. Ask your physician or nurse before getting the wound(s) wet in the shower.                 Wound care for home:      Sacral Wound:  Generous amount of Betadine and Calmoseptine to wound and maegan wound  Small amount of Lotrisone if red rash occurs  Silver Alginate to wound - please be sure to tuck alginate into wound depth

## 2023-06-02 ENCOUNTER — HOSPITAL ENCOUNTER (OUTPATIENT)
Dept: WOUND CARE | Age: 73
Discharge: HOME | End: 2023-06-02
Payer: MEDICARE

## 2023-06-02 VITALS
BODY MASS INDEX: 27.19 KG/M2 | RESPIRATION RATE: 18 BRPM | SYSTOLIC BLOOD PRESSURE: 131 MMHG | HEIGHT: 68 IN | TEMPERATURE: 98 F | WEIGHT: 179.38 LBS | HEART RATE: 89 BPM | DIASTOLIC BLOOD PRESSURE: 65 MMHG

## 2023-06-02 DIAGNOSIS — L89.154 PRESSURE ULCER OF COCCYGEAL REGION, STAGE 4 (HCC): ICD-10-CM

## 2023-06-02 DIAGNOSIS — L92.9 HYPERGRANULATION: Primary | ICD-10-CM

## 2023-06-02 PROCEDURE — 99213 OFFICE O/P EST LOW 20 MIN: CPT

## 2023-06-02 RX ORDER — LIDOCAINE 50 MG/G
OINTMENT TOPICAL ONCE
OUTPATIENT
Start: 2023-06-02 | End: 2023-06-02

## 2023-06-02 RX ORDER — LIDOCAINE 40 MG/G
CREAM TOPICAL ONCE
Status: DISCONTINUED | OUTPATIENT
Start: 2023-06-02 | End: 2023-06-03 | Stop reason: HOSPADM

## 2023-06-02 RX ORDER — LIDOCAINE HYDROCHLORIDE 40 MG/ML
SOLUTION TOPICAL ONCE
OUTPATIENT
Start: 2023-06-02 | End: 2023-06-02

## 2023-06-02 RX ORDER — BACITRACIN ZINC AND POLYMYXIN B SULFATE 500; 1000 [USP'U]/G; [USP'U]/G
OINTMENT TOPICAL ONCE
OUTPATIENT
Start: 2023-06-02 | End: 2023-06-02

## 2023-06-02 RX ORDER — LIDOCAINE 40 MG/G
CREAM TOPICAL ONCE
OUTPATIENT
Start: 2023-06-02 | End: 2023-06-02

## 2023-06-02 NOTE — PLAN OF CARE
Patient seen in [facility] today for follow up. Wound is nearly healed. There continues to be some moisture in and around wound. Will plan to place silver alginate to help with this. There is also a small rashy area noted maegan wound. Lotrisone placed. F/u in [facility] [facility] in 1 week as ordered, pt. Aware to call sooner with any changes or questions/concerns. Discharge instructions reviewed with patient, all questions answered, copy given to patient. Dressings were applied to all wounds per M.D. Instructions at this visit.

## 2023-06-02 NOTE — PLAN OF CARE
215 [facility] Physician Orders and Discharge [address], [address]  [place], [facility]  Telephone: [phone]      Fax: [phone]        Your home care company:   [facility]         Your wound-care supplies will be provided by: [employer] orders supplies through [employer]. Please note, depending on your insurance coverage, you may have out-of-pocket expenses for these supplies. Someone from [place] may call you to confirm your order and discuss those potential costs before they ship your products -- please anticipate that call. If your out-of-pocket cost is going to be less than $200, and [place] cannot reach you, they may ship your supplies as soon as the order is processed, and will send you a bill. If your out-of-pocket cost is going to be more than $200, [facility] should not ship your supplies until they speak with you. If you have any questions about your supplies or your potential out-of-pocket costs, or if you need to place an order for a refill of supplies (typically monthly), Eric Iqbal is your local representative, and can be reached at [phone]. Information on [facility]'s return policy will also be enclosed with your supplies -- please read that carefully before opening your items. NAME:  Marcus Taveras   YOB: 1950  PRIMARY DIAGNOSIS FOR WOUND CARE CENTER:  Pressure ulcer     Wound cleansing:  Do not scrub or use excessive force. Wash hands with soap and water before and after dressing changes. Prior to applying a clean dressing, cleanse wound with normal saline, wound cleanser, or mild soap and water. Ask your physician or nurse before getting the wound(s) wet in the shower.                 Wound care for home:      Sacral Wound:  Generous amount of Betadine and Calmoseptine to wound and maegan wound  Small amount of Lotrisone if red rash occurs  Silver Alginate to wound - please be sure to tuck alginate into wound depth

## 2023-06-05 NOTE — DISCHARGE INSTRUCTIONS
215 [facility] Physician Orders and Discharge [address], [address]  [place], [facility]  Telephone: [phone]      Fax: [phone]        Your home care company:   [facility]         Your wound-care supplies will be provided by: [facility] AT [facility] orders supplies through [employer]. Please note, depending on your insurance coverage, you may have out-of-pocket expenses for these supplies. Someone from [place] may call you to confirm your order and discuss those potential costs before they ship your products -- please anticipate that call. If your out-of-pocket cost is going to be less than $200, and [place] cannot reach you, they may ship your supplies as soon as the order is processed, and will send you a bill. If your out-of-pocket cost is going to be more than $200, [facility] should not ship your supplies until they speak with you. If you have any questions about your supplies or your potential out-of-pocket costs, or if you need to place an order for a refill of supplies (typically monthly), Amador Russell is your local representative, and can be reached at [phone]. Information on [employer]'s return policy will also be enclosed with your supplies -- please read that carefully before opening your items. NAME:  Yen Taveras   YOB: 1950  PRIMARY DIAGNOSIS FOR WOUND CARE CENTER:  Pressure ulcer     Wound cleansing:  Do not scrub or use excessive force. Wash hands with soap and water before and after dressing changes. Prior to applying a clean dressing, cleanse wound with normal saline, wound cleanser, or mild soap and water. Ask your physician or nurse before getting the wound(s) wet in the shower.                 Wound care for home:      Sacral Wound:  Clobetasol placed in wound today per Dr. Martinez Ripa amount of Betadine and Calmoseptine to wound and maegan wound  Silver Alginate to wound - please be sure to tuck alginate into wound

## 2023-06-09 ENCOUNTER — HOSPITAL ENCOUNTER (OUTPATIENT)
Dept: WOUND CARE | Age: 73
Discharge: HOME | End: 2023-06-09
Payer: MEDICARE

## 2023-06-09 VITALS
RESPIRATION RATE: 18 BRPM | SYSTOLIC BLOOD PRESSURE: 92 MMHG | DIASTOLIC BLOOD PRESSURE: 59 MMHG | TEMPERATURE: 97.8 F | HEART RATE: 100 BPM | HEIGHT: 68 IN | WEIGHT: 181.2 LBS | BODY MASS INDEX: 27.46 KG/M2

## 2023-06-09 DIAGNOSIS — L92.9 HYPERGRANULATION: ICD-10-CM

## 2023-06-09 DIAGNOSIS — I87.2 VENOUS INSUFFICIENCY OF LEFT LOWER EXTREMITY: Primary | ICD-10-CM

## 2023-06-09 DIAGNOSIS — L89.154 PRESSURE ULCER OF COCCYGEAL REGION, STAGE 4 (HCC): ICD-10-CM

## 2023-06-09 PROCEDURE — 99213 OFFICE O/P EST LOW 20 MIN: CPT

## 2023-06-09 RX ORDER — LIDOCAINE 40 MG/G
CREAM TOPICAL ONCE
OUTPATIENT
Start: 2023-06-09 | End: 2023-06-09

## 2023-06-09 RX ORDER — LIDOCAINE HYDROCHLORIDE 40 MG/ML
SOLUTION TOPICAL ONCE
OUTPATIENT
Start: 2023-06-09 | End: 2023-06-09

## 2023-06-09 RX ORDER — BACITRACIN ZINC AND POLYMYXIN B SULFATE 500; 1000 [USP'U]/G; [USP'U]/G
OINTMENT TOPICAL ONCE
OUTPATIENT
Start: 2023-06-09 | End: 2023-06-09

## 2023-06-09 RX ORDER — LIDOCAINE 40 MG/G
CREAM TOPICAL ONCE
Status: DISCONTINUED | OUTPATIENT
Start: 2023-06-09 | End: 2023-06-10 | Stop reason: HOSPADM

## 2023-06-09 RX ORDER — LIDOCAINE 50 MG/G
OINTMENT TOPICAL ONCE
OUTPATIENT
Start: 2023-06-09 | End: 2023-06-09

## 2023-06-09 NOTE — PLAN OF CARE
Patient seen in [facility] today for follow up. Patient wound continues to stay moist. No source of drainage visualized. Wound appears healed, though fragilely. Dr. Lexi Salcido applied Clobetasol to wound area. Will try this for the week to see if it helps with drainage. No rash noted maegan wound. Patient reports offloading frequently throughout the day. F/u in [facility] in 2 weeks as ordered, pt. Aware to call sooner with any changes or questions/concerns. Discharge instructions reviewed with patient, all questions answered, copy given to patient. Dressings were applied to all wounds per M.D. Instructions at this visit.

## 2023-06-09 NOTE — PLAN OF CARE
215 [facility] Physician Orders and Discharge 800 [facility]  [address], [address]  [place], [facility]  Telephone: [phone]      Fax: [phone]        Your home care company:   [facility]         Your wound-care supplies will be provided by: [employer] orders supplies through [employer]. Please note, depending on your insurance coverage, you may have out-of-pocket expenses for these supplies. Someone from [place] may call you to confirm your order and discuss those potential costs before they ship your products -- please anticipate that call. If your out-of-pocket cost is going to be less than $200, and [place] cannot reach you, they may ship your supplies as soon as the order is processed, and will send you a bill. If your out-of-pocket cost is going to be more than $200, [facility] should not ship your supplies until they speak with you. If you have any questions about your supplies or your potential out-of-pocket costs, or if you need to place an order for a refill of supplies (typically monthly), Dorothy Goldmann is your local representative, and can be reached at [phone]. Information on [place]'s return policy will also be enclosed with your supplies -- please read that carefully before opening your items. NAME:  Caryn Taveras   YOB: 1950  PRIMARY DIAGNOSIS FOR WOUND CARE CENTER:  Pressure ulcer     Wound cleansing:  Do not scrub or use excessive force. Wash hands with soap and water before and after dressing changes. Prior to applying a clean dressing, cleanse wound with normal saline, wound cleanser, or mild soap and water. Ask your physician or nurse before getting the wound(s) wet in the shower.                 Wound care for home:      Sacral Wound:  Clobetasol placed in wound today per Dr. Milian Given amount of Betadine and Calmoseptine to wound and maegan wound  Silver Alginate to wound - please be sure to tuck alginate into wound

## 2023-06-12 PROBLEM — R23.9 MACERATION OF PERIWOUND SKIN: Status: ACTIVE | Noted: 2023-06-12

## 2023-06-19 NOTE — DISCHARGE INSTRUCTIONS
215 [facility] Physician Orders and Discharge [address], [address]  [place], [facility]  Telephone: [phone]      Fax: [phone]        Your home care company:   [facility]         Your wound-care supplies will be provided by: [facility] AT [facility] orders supplies through [employer]. Please note, depending on your insurance coverage, you may have out-of-pocket expenses for these supplies. Someone from [place] may call you to confirm your order and discuss those potential costs before they ship your products -- please anticipate that call. If your out-of-pocket cost is going to be less than $200, and [place] cannot reach you, they may ship your supplies as soon as the order is processed, and will send you a bill. If your out-of-pocket cost is going to be more than $200, [facility] should not ship your supplies until they speak with you. If you have any questions about your supplies or your potential out-of-pocket costs, or if you need to place an order for a refill of supplies (typically monthly), Narda Walsh is your local representative, and can be reached at [phone]. Information on [employer]'s return policy will also be enclosed with your supplies -- please read that carefully before opening your items. NAME:  Loretta Cabot Pegan   YOB: 1950  PRIMARY DIAGNOSIS FOR WOUND CARE CENTER:  Pressure ulcer     Wound cleansing:  Do not scrub or use excessive force. Wash hands with soap and water before and after dressing changes. Prior to applying a clean dressing, cleanse wound with normal saline, wound cleanser, or mild soap and water. Ask your physician or nurse before getting the wound(s) wet in the shower.                 Wound care for home:      Sacral Wound:    Generous amount of Betadine  to wound and maegan wound  Jenifer to maegan wound  Hydrofera Blue moistened to wound - please be sure to cut and tuck into wound depth  - please provide patient with

## 2023-06-23 ENCOUNTER — HOSPITAL ENCOUNTER (OUTPATIENT)
Dept: WOUND CARE | Age: 73
Discharge: HOME | End: 2023-06-23
Payer: MEDICARE

## 2023-06-23 VITALS
BODY MASS INDEX: 26 KG/M2 | HEART RATE: 82 BPM | DIASTOLIC BLOOD PRESSURE: 82 MMHG | RESPIRATION RATE: 18 BRPM | TEMPERATURE: 98.6 F | WEIGHT: 175.5 LBS | HEIGHT: 69 IN | SYSTOLIC BLOOD PRESSURE: 128 MMHG

## 2023-06-23 DIAGNOSIS — L89.154 PRESSURE ULCER OF COCCYGEAL REGION, STAGE 4 (HCC): ICD-10-CM

## 2023-06-23 DIAGNOSIS — I87.2 VENOUS INSUFFICIENCY OF LEFT LOWER EXTREMITY: Primary | ICD-10-CM

## 2023-06-23 DIAGNOSIS — L92.9 HYPERGRANULATION: ICD-10-CM

## 2023-06-23 PROCEDURE — 17250 CHEM CAUT OF GRANLTJ TISSUE: CPT

## 2023-06-23 RX ORDER — LIDOCAINE 40 MG/G
CREAM TOPICAL ONCE
Status: DISCONTINUED | OUTPATIENT
Start: 2023-06-23 | End: 2023-06-24 | Stop reason: HOSPADM

## 2023-06-23 RX ORDER — LIDOCAINE 40 MG/G
CREAM TOPICAL ONCE
OUTPATIENT
Start: 2023-06-23 | End: 2023-06-23

## 2023-06-23 RX ORDER — BACITRACIN ZINC AND POLYMYXIN B SULFATE 500; 1000 [USP'U]/G; [USP'U]/G
OINTMENT TOPICAL ONCE
OUTPATIENT
Start: 2023-06-23 | End: 2023-06-23

## 2023-06-23 RX ORDER — LIDOCAINE HYDROCHLORIDE 40 MG/ML
SOLUTION TOPICAL ONCE
OUTPATIENT
Start: 2023-06-23 | End: 2023-06-23

## 2023-06-23 RX ORDER — LIDOCAINE 50 MG/G
OINTMENT TOPICAL ONCE
OUTPATIENT
Start: 2023-06-23 | End: 2023-06-23

## 2023-06-23 NOTE — PLAN OF CARE
215 [facility] Physician Orders and Discharge [address]  [address], [address]  [place], [facility]  Telephone: [phone]      Fax: [phone]        Your home care company:   [facility]         Your wound-care supplies will be provided by: [employer] orders supplies through [employer]. Please note, depending on your insurance coverage, you may have out-of-pocket expenses for these supplies. Someone from [place] may call you to confirm your order and discuss those potential costs before they ship your products -- please anticipate that call. If your out-of-pocket cost is going to be less than $200, and [place] cannot reach you, they may ship your supplies as soon as the order is processed, and will send you a bill. If your out-of-pocket cost is going to be more than $200, [facility] should not ship your supplies until they speak with you. If you have any questions about your supplies or your potential out-of-pocket costs, or if you need to place an order for a refill of supplies (typically monthly), Joyice Sandhoff is your local representative, and can be reached at [phone]. Information on [facility]'s return policy will also be enclosed with your supplies -- please read that carefully before opening your items. NAME:  Milo Taveras   YOB: 1950  PRIMARY DIAGNOSIS FOR WOUND CARE CENTER:  Pressure ulcer     Wound cleansing:  Do not scrub or use excessive force. Wash hands with soap and water before and after dressing changes. Prior to applying a clean dressing, cleanse wound with normal saline, wound cleanser, or mild soap and water. Ask your physician or nurse before getting the wound(s) wet in the shower.                 Wound care for home:      Sacral Wound:    Generous amount of Betadine  to wound and maegan wound  Jenifer to maegan wound  Hydrofera Blue moistened to wound - please be sure to cut and tuck into wound depth  - please provide patient with

## 2023-06-23 NOTE — PLAN OF CARE
Pt seen in [address] [place] [address],[address] - noted more drainage / moist wound- cautery per Dr. Lance Necessary - treatment as follows   Sacral Wound:    Generous amount of Betadine  to wound and maegan wound  Jenifer to maegan wound  Hydrofera Blue moistened to wound - please be sure to cut and tuck into wound depth  - please provide patient with remaining   Gauze fluffed  Mepilex Border  Change daily    Reviewed AVS - discussed recent HgA1C - 8.3- encouraged pt to F/U with PCP for better Blood sugar control

## 2023-06-30 ENCOUNTER — HOSPITAL ENCOUNTER (OUTPATIENT)
Dept: WOUND CARE | Age: 73
Discharge: HOME | End: 2023-06-30
Payer: MEDICARE

## 2023-06-30 VITALS
WEIGHT: 182.2 LBS | HEIGHT: 69 IN | HEART RATE: 77 BPM | DIASTOLIC BLOOD PRESSURE: 54 MMHG | BODY MASS INDEX: 26.98 KG/M2 | RESPIRATION RATE: 18 BRPM | SYSTOLIC BLOOD PRESSURE: 123 MMHG | TEMPERATURE: 98.1 F

## 2023-06-30 DIAGNOSIS — L89.154 PRESSURE ULCER OF COCCYGEAL REGION, STAGE 4 (HCC): ICD-10-CM

## 2023-06-30 DIAGNOSIS — L92.9 HYPERGRANULATION: ICD-10-CM

## 2023-06-30 DIAGNOSIS — I87.2 VENOUS INSUFFICIENCY OF LEFT LOWER EXTREMITY: Primary | ICD-10-CM

## 2023-06-30 PROCEDURE — 99213 OFFICE O/P EST LOW 20 MIN: CPT

## 2023-06-30 RX ORDER — LIDOCAINE HYDROCHLORIDE 40 MG/ML
SOLUTION TOPICAL ONCE
OUTPATIENT
Start: 2023-06-30 | End: 2023-06-30

## 2023-06-30 RX ORDER — LIDOCAINE 40 MG/G
CREAM TOPICAL ONCE
Status: DISCONTINUED | OUTPATIENT
Start: 2023-06-30 | End: 2023-07-01 | Stop reason: HOSPADM

## 2023-06-30 RX ORDER — LIDOCAINE 40 MG/G
CREAM TOPICAL ONCE
OUTPATIENT
Start: 2023-06-30 | End: 2023-06-30

## 2023-06-30 RX ORDER — LIDOCAINE 50 MG/G
OINTMENT TOPICAL ONCE
OUTPATIENT
Start: 2023-06-30 | End: 2023-06-30

## 2023-06-30 RX ORDER — BACITRACIN ZINC AND POLYMYXIN B SULFATE 500; 1000 [USP'U]/G; [USP'U]/G
OINTMENT TOPICAL ONCE
OUTPATIENT
Start: 2023-06-30 | End: 2023-06-30

## 2023-07-07 PROBLEM — E11.65 UNCONTROLLED TYPE 2 DIABETES MELLITUS WITH HYPERGLYCEMIA (HCC): Status: ACTIVE | Noted: 2023-07-07

## 2023-07-07 PROBLEM — B37.2 CANDIDAL DERMATITIS: Status: RESOLVED | Noted: 2023-05-01 | Resolved: 2023-07-07

## 2023-07-10 NOTE — DISCHARGE INSTRUCTIONS
411 [facility] Physician Orders and Discharge [facility]  [address] [place] [address], [address] [place] [address]  [place], [address]  Telephone: [phone]      Fax: [phone]        Your home care company:   [facility]         Your wound-care supplies will be provided by: 8351  [facility] orders supplies through [employer]. Please note, depending on your insurance coverage, you may have out-of-pocket expenses for these supplies. Someone from [place] may call you to confirm your order and discuss those potential costs before they ship your products -- please anticipate that call. If your out-of-pocket cost is going to be less than $200, and [facility] cannot reach you, they may ship your supplies as soon as the order is processed, and will send you a bill. If your out-of-pocket cost is going to be more than $200, [place] should not ship your supplies until they speak with you. If you have any questions about your supplies or your potential out-of-pocket costs, or if you need to place an order for a refill of supplies (typically monthly), Carlito Jung is your local representative, and can be reached at [phone]. Information on [email or web address]'s return policy will also be enclosed with your supplies -- please read that carefully before opening your items. NAME:  Sue Taveras   YOB: 1950  PRIMARY DIAGNOSIS FOR WOUND CARE CENTER:  Pressure ulcer     Wound cleansing:  Do not scrub or use excessive force. Wash hands with soap and water before and after dressing changes. Prior to applying a clean dressing, cleanse wound with normal saline, wound cleanser, or mild soap and water. Ask your physician or nurse before getting the wound(s) wet in the shower.                 Wound care for home:      Sacral Wound:  Generous amount of Betadine to wound and maegan wound  Triad to damp skin maegan wound  Hydrofera Blue moistened to wound - please be sure to cut and tuck into wound depth (please provide patient

## 2023-07-14 ENCOUNTER — HOSPITAL ENCOUNTER (OUTPATIENT)
Dept: WOUND CARE | Age: 73
Discharge: HOME | End: 2023-07-14
Payer: MEDICARE

## 2023-07-14 VITALS
DIASTOLIC BLOOD PRESSURE: 55 MMHG | BODY MASS INDEX: 26.9 KG/M2 | SYSTOLIC BLOOD PRESSURE: 95 MMHG | HEART RATE: 82 BPM | RESPIRATION RATE: 18 BRPM | TEMPERATURE: 98.3 F | WEIGHT: 181.6 LBS | HEIGHT: 69 IN

## 2023-07-14 DIAGNOSIS — L89.154 PRESSURE ULCER OF COCCYGEAL REGION, STAGE 4 (HCC): ICD-10-CM

## 2023-07-14 DIAGNOSIS — I87.2 VENOUS INSUFFICIENCY OF LEFT LOWER EXTREMITY: Primary | ICD-10-CM

## 2023-07-14 DIAGNOSIS — L92.9 HYPERGRANULATION: ICD-10-CM

## 2023-07-14 PROCEDURE — 17250 CHEM CAUT OF GRANLTJ TISSUE: CPT

## 2023-07-14 RX ORDER — LIDOCAINE HYDROCHLORIDE 40 MG/ML
SOLUTION TOPICAL ONCE
OUTPATIENT
Start: 2023-07-14 | End: 2023-07-14

## 2023-07-14 RX ORDER — BACITRACIN ZINC AND POLYMYXIN B SULFATE 500; 1000 [USP'U]/G; [USP'U]/G
OINTMENT TOPICAL ONCE
OUTPATIENT
Start: 2023-07-14 | End: 2023-07-14

## 2023-07-14 RX ORDER — LIDOCAINE 40 MG/G
CREAM TOPICAL ONCE
Status: DISCONTINUED | OUTPATIENT
Start: 2023-07-14 | End: 2023-07-15 | Stop reason: HOSPADM

## 2023-07-14 RX ORDER — LIDOCAINE 40 MG/G
CREAM TOPICAL ONCE
OUTPATIENT
Start: 2023-07-14 | End: 2023-07-14

## 2023-07-14 RX ORDER — LIDOCAINE 50 MG/G
OINTMENT TOPICAL ONCE
OUTPATIENT
Start: 2023-07-14 | End: 2023-07-14

## 2023-07-14 NOTE — PLAN OF CARE
Pt seen in [facility]- Coccyx wound unchanged - no debride - cautery only per Dr. Tonny Sanders - cont treatment as follows    Sacral Wound:  Generous amount of Betadine to wound and maegan wound  Triad to damp skin maegan wound  Hydrofera Blue moistened to wound - please be sure to cut and tuck into wound depth (please provide patient with remaining)  Gauze fluffed  Cover with Mepilex Border  Change daily    Reviewed AVS - f/u in 2 weeks

## 2023-07-14 NOTE — PLAN OF CARE
411 [facility] Physician Orders and Discharge [facility]  [address] [place] [address], [address] [place] [address], [address]  Telephone: [phone]      Fax: [phone]        Your home care company:   [facility]         Your wound-care supplies will be provided by: [address] [place] [address] 2[address] [facility] orders supplies through [employer]. Please note, depending on your insurance coverage, you may have out-of-pocket expenses for these supplies. Someone from [place] may call you to confirm your order and discuss those potential costs before they ship your products -- please anticipate that call. If your out-of-pocket cost is going to be less than $200, and [facility] cannot reach you, they may ship your supplies as soon as the order is processed, and will send you a bill. If your out-of-pocket cost is going to be more than $200, [place] should not ship your supplies until they speak with you. If you have any questions about your supplies or your potential out-of-pocket costs, or if you need to place an order for a refill of supplies (typically monthly), Pranav Guadalupe is your local representative, and can be reached at [phone]. Information on [employer]'s return policy will also be enclosed with your supplies -- please read that carefully before opening your items. NAME:  Chauncey Taveras   YOB: 1950  PRIMARY DIAGNOSIS FOR WOUND CARE CENTER:  Pressure ulcer     Wound cleansing:  Do not scrub or use excessive force. Wash hands with soap and water before and after dressing changes. Prior to applying a clean dressing, cleanse wound with normal saline, wound cleanser, or mild soap and water. Ask your physician or nurse before getting the wound(s) wet in the shower.                 Wound care for home:      Sacral Wound:  Generous amount of Betadine to wound and maegan wound  Triad to damp skin maegan wound  Hydrofera Blue moistened to wound - please be sure to cut and tuck into wound depth (please provide patient

## 2023-07-18 ENCOUNTER — TELEPHONE (OUTPATIENT)
Dept: PULMONOLOGY | Age: 73
End: 2023-07-18

## 2023-07-18 NOTE — TELEPHONE ENCOUNTER
Patient states she has thrush again from her inhaler and is wondering if Berrien Niko could call in the meds to clear it up again. Please advise.  thanks

## 2023-07-24 NOTE — DISCHARGE INSTRUCTIONS
.     Your nurse  is Mily Lo RN     If we applied slip-resistant hospital socks today, be sure to remove them at least once a day to inspect your toes or feet, even if you're not changing the wraps or dressings underneath. If you see anything concerning (redness, excess moisture, etc), please call and let us know right away. Should you experience any significant changes in your wound(s) (including redness, increased warmth, increased pain, increased drainage, odor, or fever) or have questions about your wound care, please contact the [facility] at [phone] Monday-Thursday from 8:00 am - 4:30 pm, or Friday from 8:00 am - 2:30 pm.  If you need help with your wound outside these hours and cannot wait until we are again available, contact your home-care company (if applicable), your PCP, or go to the nearest emergency room.

## 2023-07-25 NOTE — TELEPHONE ENCOUNTER
Patient is calling about the thrush again and would like to have something in pill form called in. Please advise.

## 2023-07-25 NOTE — TELEPHONE ENCOUNTER
Tell pt I sent in nystatin S&S. I am not sure why the other message was never addressed, I apologize.

## 2023-07-28 ENCOUNTER — HOSPITAL ENCOUNTER (OUTPATIENT)
Dept: WOUND CARE | Age: 73
Discharge: HOME | End: 2023-07-28
Payer: MEDICARE

## 2023-07-28 VITALS
DIASTOLIC BLOOD PRESSURE: 66 MMHG | HEART RATE: 76 BPM | BODY MASS INDEX: 27.11 KG/M2 | WEIGHT: 183 LBS | HEIGHT: 69 IN | TEMPERATURE: 98.5 F | RESPIRATION RATE: 18 BRPM | SYSTOLIC BLOOD PRESSURE: 129 MMHG

## 2023-07-28 DIAGNOSIS — I87.2 VENOUS INSUFFICIENCY OF LEFT LOWER EXTREMITY: Primary | ICD-10-CM

## 2023-07-28 DIAGNOSIS — L92.9 HYPERGRANULATION: ICD-10-CM

## 2023-07-28 DIAGNOSIS — L89.154 PRESSURE ULCER OF COCCYGEAL REGION, STAGE 4 (HCC): ICD-10-CM

## 2023-07-28 PROCEDURE — 17250 CHEM CAUT OF GRANLTJ TISSUE: CPT

## 2023-07-28 RX ORDER — BACITRACIN ZINC AND POLYMYXIN B SULFATE 500; 1000 [USP'U]/G; [USP'U]/G
OINTMENT TOPICAL ONCE
OUTPATIENT
Start: 2023-07-28 | End: 2023-07-28

## 2023-07-28 RX ORDER — LIDOCAINE 40 MG/G
CREAM TOPICAL ONCE
OUTPATIENT
Start: 2023-07-28 | End: 2023-07-28

## 2023-07-28 RX ORDER — LIDOCAINE 50 MG/G
OINTMENT TOPICAL ONCE
OUTPATIENT
Start: 2023-07-28 | End: 2023-07-28

## 2023-07-28 RX ORDER — LIDOCAINE HYDROCHLORIDE 40 MG/ML
SOLUTION TOPICAL ONCE
OUTPATIENT
Start: 2023-07-28 | End: 2023-07-28

## 2023-07-28 RX ORDER — LIDOCAINE 40 MG/G
CREAM TOPICAL ONCE
Status: DISCONTINUED | OUTPATIENT
Start: 2023-07-28 | End: 2023-07-29 | Stop reason: HOSPADM

## 2023-07-28 NOTE — PLAN OF CARE
[address] [facility] Physician Orders and Discharge [facility]  [address] [place] [address], [address] [place], [place] [address], [address]  Telephone: [phone]      Fax: [phone]        Your home care company:   [facility]         Your wound-care supplies will be provided by: [facility] AT [facility] orders supplies through [employer]. Please note, depending on your insurance coverage, you may have out-of-pocket expenses for these supplies. Someone from [place] may call you to confirm your order and discuss those potential costs before they ship your products -- please anticipate that call. If your out-of-pocket cost is going to be less than $200, and [facility] cannot reach you, they may ship your supplies as soon as the order is processed, and will send you a bill. If your out-of-pocket cost is going to be more than $200, [place] should not ship your supplies until they speak with you. If you have any questions about your supplies or your potential out-of-pocket costs, or if you need to place an order for a refill of supplies (typically monthly), Kyleigh Gomez is your local representative, and can be reached at [phone]. Information on [employer]'s return policy will also be enclosed with your supplies -- please read that carefully before opening your items. NAME:  Annmarie Taveras   YOB: 1950  PRIMARY DIAGNOSIS FOR WOUND CARE CENTER:  Pressure ulcer     Wound cleansing:  Do not scrub or use excessive force. Wash hands with soap and water before and after dressing changes. Prior to applying a clean dressing, cleanse wound with normal saline, wound cleanser, or mild soap and water. Ask your physician or nurse before getting the wound(s) wet in the shower. Wound care for home:      Sacral Wound:  Apply silver nitrate sticks to internal part of wound DAILY, unless you can still see the silver/black residue from previous day.      Generous amount of Betadine to wound and maegan wound  [facility]

## 2023-07-28 NOTE — PLAN OF CARE
Patient seen in [facility] today for follow up. Patient reports feeling well overall. Wounds showing signs of improvement. Patient family has been applying silver nitrate to wounds every other day at home. Wound remains wet. Blanca wound macerated. We will increase this to daily unless there is any residue from previous days nitrate in place. No other change in plan of care. Silver nitrate applied in [facility] today. Patient is scheduled for infusion next week for her rheumatoid arthritis. Mattress topper discussed today. Patient plans to purchase one in the near future. F/u in [facility] in 2 weeks as ordered, pt. Aware to call sooner with any changes or questions/concerns. Discharge instructions reviewed with patient, all questions answered, copy given to patient. Dressings were applied to all wounds per M.D. Instructions at this visit.

## 2023-08-05 ENCOUNTER — HOSPITAL ENCOUNTER (INPATIENT)
Age: 73
LOS: 5 days | Discharge: HOME | DRG: 291 | End: 2023-08-10
Attending: STUDENT IN AN ORGANIZED HEALTH CARE EDUCATION/TRAINING PROGRAM | Admitting: INTERNAL MEDICINE
Payer: MEDICARE

## 2023-08-05 ENCOUNTER — APPOINTMENT (OUTPATIENT)
Dept: GENERAL RADIOLOGY | Age: 73
DRG: 291 | End: 2023-08-05
Payer: MEDICARE

## 2023-08-05 ENCOUNTER — ANCILLARY PROCEDURE (OUTPATIENT)
Dept: EMERGENCY DEPT | Age: 73
DRG: 291 | End: 2023-08-05
Attending: STUDENT IN AN ORGANIZED HEALTH CARE EDUCATION/TRAINING PROGRAM
Payer: MEDICARE

## 2023-08-05 DIAGNOSIS — E87.1 HYPONATREMIA: ICD-10-CM

## 2023-08-05 DIAGNOSIS — N17.9 ACUTE KIDNEY INJURY (HCC): Primary | ICD-10-CM

## 2023-08-05 DIAGNOSIS — R77.8 ELEVATED TROPONIN: ICD-10-CM

## 2023-08-05 DIAGNOSIS — E87.20 ACIDOSIS: ICD-10-CM

## 2023-08-05 PROBLEM — J44.1 COPD EXACERBATION (HCC): Status: ACTIVE | Noted: 2023-08-05

## 2023-08-05 LAB
ALBUMIN SERPL-MCNC: 3.2 G/DL (ref 3.4–5)
ALBUMIN/GLOB SERPL: 1.1 {RATIO} (ref 1.1–2.2)
ALP SERPL-CCNC: 81 U/L (ref 40–129)
ALT SERPL-CCNC: 9 U/L (ref 10–40)
ANION GAP SERPL CALCULATED.3IONS-SCNC: 13 MMOL/L (ref 3–16)
AST SERPL-CCNC: 16 U/L (ref 15–37)
BACTERIA URNS QL MICRO: ABNORMAL /HPF
BASE EXCESS BLDV CALC-SCNC: -3 MMOL/L (ref -3–3)
BASOPHILS # BLD: 0.1 K/UL (ref 0–0.2)
BASOPHILS NFR BLD: 0.7 %
BILIRUB SERPL-MCNC: 1.1 MG/DL (ref 0–1)
BILIRUB UR QL STRIP.AUTO: NEGATIVE
BUN SERPL-MCNC: 30 MG/DL (ref 7–20)
CALCIUM SERPL-MCNC: 8.4 MG/DL (ref 8.3–10.6)
CHLORIDE SERPL-SCNC: 91 MMOL/L (ref 99–110)
CLARITY UR: CLEAR
CO2 BLDV-SCNC: 24 MMOL/L
CO2 SERPL-SCNC: 23 MMOL/L (ref 21–32)
COHGB MFR BLDV: 1.5 % (ref 0–1.5)
COLOR UR: YELLOW
CREAT SERPL-MCNC: 1.9 MG/DL (ref 0.6–1.2)
DEPRECATED RDW RBC AUTO: 14.6 % (ref 12.4–15.4)
EKG ATRIAL RATE: 68 BPM
EKG DIAGNOSIS: NORMAL
EKG P AXIS: 61 DEGREES
EKG P-R INTERVAL: 156 MS
EKG Q-T INTERVAL: 454 MS
EKG QRS DURATION: 106 MS
EKG QTC CALCULATION (BAZETT): 482 MS
EKG R AXIS: -26 DEGREES
EKG T AXIS: 40 DEGREES
EKG VENTRICULAR RATE: 68 BPM
EOSINOPHIL # BLD: 0.3 K/UL (ref 0–0.6)
EOSINOPHIL NFR BLD: 4 %
EPI CELLS #/AREA URNS HPF: ABNORMAL /HPF (ref 0–5)
FLUAV RNA RESP QL NAA+PROBE: NOT DETECTED
FLUBV RNA RESP QL NAA+PROBE: NOT DETECTED
GFR SERPLBLD CREATININE-BSD FMLA CKD-EPI: 28 ML/MIN/{1.73_M2}
GLUCOSE BLD-MCNC: 282 MG/DL (ref 70–99)
GLUCOSE BLD-MCNC: 464 MG/DL (ref 70–99)
GLUCOSE BLD-MCNC: 491 MG/DL (ref 70–99)
GLUCOSE BLD-MCNC: 507 MG/DL (ref 70–99)
GLUCOSE BLD-MCNC: 531 MG/DL (ref 70–99)
GLUCOSE SERPL-MCNC: 230 MG/DL (ref 70–99)
GLUCOSE UR STRIP.AUTO-MCNC: NEGATIVE MG/DL
HCO3 BLDV-SCNC: 22.5 MMOL/L (ref 23–29)
HCT VFR BLD AUTO: 27 % (ref 36–48)
HGB BLD-MCNC: 9 G/DL (ref 12–16)
HGB UR QL STRIP.AUTO: ABNORMAL
KETONES UR STRIP.AUTO-MCNC: NEGATIVE MG/DL
LACTATE BLDV-SCNC: 1 MMOL/L (ref 0.4–1.9)
LEUKOCYTE ESTERASE UR QL STRIP.AUTO: ABNORMAL
LYMPHOCYTES # BLD: 0.8 K/UL (ref 1–5.1)
LYMPHOCYTES NFR BLD: 10.3 %
MCH RBC QN AUTO: 30 PG (ref 26–34)
MCHC RBC AUTO-ENTMCNC: 33.3 G/DL (ref 31–36)
MCV RBC AUTO: 90.2 FL (ref 80–100)
METHGB MFR BLDV: 0.3 %
MONOCYTES # BLD: 0.5 K/UL (ref 0–1.3)
MONOCYTES NFR BLD: 7.2 %
NEUTROPHILS # BLD: 5.7 K/UL (ref 1.7–7.7)
NEUTROPHILS NFR BLD: 77.8 %
NITRITE UR QL STRIP.AUTO: NEGATIVE
NT-PROBNP SERPL-MCNC: 2347 PG/ML (ref 0–124)
O2 CT VFR BLDV CALC: 13 VOL %
O2 THERAPY: ABNORMAL
PCO2 BLDV: 41.9 MMHG (ref 40–50)
PERFORMED ON: ABNORMAL
PH BLDV: 7.35 [PH] (ref 7.35–7.45)
PH UR STRIP.AUTO: 5.5 [PH] (ref 5–8)
PLATELET # BLD AUTO: 250 K/UL (ref 135–450)
PMV BLD AUTO: 7.5 FL (ref 5–10.5)
PO2 BLDV: 73.6 MMHG (ref 25–40)
POTASSIUM SERPL-SCNC: 4.2 MMOL/L (ref 3.5–5.1)
PROT SERPL-MCNC: 6.1 G/DL (ref 6.4–8.2)
PROT UR STRIP.AUTO-MCNC: NEGATIVE MG/DL
RBC # BLD AUTO: 2.99 M/UL (ref 4–5.2)
RBC #/AREA URNS HPF: ABNORMAL /HPF (ref 0–4)
RENAL EPI CELLS #/AREA UR COMP ASSIST: ABNORMAL /HPF (ref 0–1)
SAO2 % BLDV: 94 %
SARS-COV-2 RNA RESP QL NAA+PROBE: NOT DETECTED
SODIUM SERPL-SCNC: 127 MMOL/L (ref 136–145)
SP GR UR STRIP.AUTO: 1.02 (ref 1–1.03)
TROPONIN, HIGH SENSITIVITY: 29 NG/L (ref 0–14)
TROPONIN, HIGH SENSITIVITY: 33 NG/L (ref 0–14)
UA COMPLETE W REFLEX CULTURE PNL UR: YES
UA DIPSTICK W REFLEX MICRO PNL UR: YES
URN SPEC COLLECT METH UR: ABNORMAL
UROBILINOGEN UR STRIP-ACNC: 0.2 E.U./DL
WBC # BLD AUTO: 7.3 K/UL (ref 4–11)
WBC #/AREA URNS HPF: ABNORMAL /HPF (ref 0–5)

## 2023-08-05 PROCEDURE — 6360000002 HC RX W HCPCS: Performed by: REGISTERED NURSE

## 2023-08-05 PROCEDURE — 6370000000 HC RX 637 (ALT 250 FOR IP): Performed by: STUDENT IN AN ORGANIZED HEALTH CARE EDUCATION/TRAINING PROGRAM

## 2023-08-05 PROCEDURE — 51798 US URINE CAPACITY MEASURE: CPT

## 2023-08-05 PROCEDURE — 99285 EMERGENCY DEPT VISIT HI MDM: CPT

## 2023-08-05 PROCEDURE — 87086 URINE CULTURE/COLONY COUNT: CPT

## 2023-08-05 PROCEDURE — 85025 COMPLETE CBC W/AUTO DIFF WBC: CPT

## 2023-08-05 PROCEDURE — 80053 COMPREHEN METABOLIC PANEL: CPT

## 2023-08-05 PROCEDURE — 6370000000 HC RX 637 (ALT 250 FOR IP): Performed by: INTERNAL MEDICINE

## 2023-08-05 PROCEDURE — 87636 SARSCOV2 & INF A&B AMP PRB: CPT

## 2023-08-05 PROCEDURE — 96374 THER/PROPH/DIAG INJ IV PUSH: CPT

## 2023-08-05 PROCEDURE — 83605 ASSAY OF LACTIC ACID: CPT

## 2023-08-05 PROCEDURE — 83880 ASSAY OF NATRIURETIC PEPTIDE: CPT

## 2023-08-05 PROCEDURE — 94761 N-INVAS EAR/PLS OXIMETRY MLT: CPT

## 2023-08-05 PROCEDURE — 83036 HEMOGLOBIN GLYCOSYLATED A1C: CPT

## 2023-08-05 PROCEDURE — 93005 ELECTROCARDIOGRAM TRACING: CPT | Performed by: REGISTERED NURSE

## 2023-08-05 PROCEDURE — 2580000003 HC RX 258: Performed by: REGISTERED NURSE

## 2023-08-05 PROCEDURE — 2580000003 HC RX 258: Performed by: INTERNAL MEDICINE

## 2023-08-05 PROCEDURE — 87077 CULTURE AEROBIC IDENTIFY: CPT

## 2023-08-05 PROCEDURE — 71046 X-RAY EXAM CHEST 2 VIEWS: CPT

## 2023-08-05 PROCEDURE — 6370000000 HC RX 637 (ALT 250 FOR IP): Performed by: REGISTERED NURSE

## 2023-08-05 PROCEDURE — 6360000002 HC RX W HCPCS: Performed by: STUDENT IN AN ORGANIZED HEALTH CARE EDUCATION/TRAINING PROGRAM

## 2023-08-05 PROCEDURE — 2060000000 HC ICU INTERMEDIATE R&B

## 2023-08-05 PROCEDURE — 93010 ELECTROCARDIOGRAM REPORT: CPT | Performed by: INTERNAL MEDICINE

## 2023-08-05 PROCEDURE — 94640 AIRWAY INHALATION TREATMENT: CPT

## 2023-08-05 PROCEDURE — 87186 SC STD MICRODIL/AGAR DIL: CPT

## 2023-08-05 PROCEDURE — 87040 BLOOD CULTURE FOR BACTERIA: CPT

## 2023-08-05 PROCEDURE — 84484 ASSAY OF TROPONIN QUANT: CPT

## 2023-08-05 PROCEDURE — 36415 COLL VENOUS BLD VENIPUNCTURE: CPT

## 2023-08-05 PROCEDURE — 82803 BLOOD GASES ANY COMBINATION: CPT

## 2023-08-05 PROCEDURE — 93308 TTE F-UP OR LMTD: CPT

## 2023-08-05 PROCEDURE — 81001 URINALYSIS AUTO W/SCOPE: CPT

## 2023-08-05 RX ORDER — IPRATROPIUM BROMIDE AND ALBUTEROL SULFATE 2.5; .5 MG/3ML; MG/3ML
1 SOLUTION RESPIRATORY (INHALATION)
Status: DISCONTINUED | OUTPATIENT
Start: 2023-08-05 | End: 2023-08-05

## 2023-08-05 RX ORDER — ENOXAPARIN SODIUM 100 MG/ML
30 INJECTION SUBCUTANEOUS NIGHTLY
Status: DISCONTINUED | OUTPATIENT
Start: 2023-08-06 | End: 2023-08-07

## 2023-08-05 RX ORDER — ACETAMINOPHEN 650 MG/1
650 SUPPOSITORY RECTAL EVERY 6 HOURS PRN
Status: DISCONTINUED | OUTPATIENT
Start: 2023-08-05 | End: 2023-08-10 | Stop reason: HOSPADM

## 2023-08-05 RX ORDER — SODIUM CHLORIDE 9 MG/ML
INJECTION, SOLUTION INTRAVENOUS PRN
Status: DISCONTINUED | OUTPATIENT
Start: 2023-08-05 | End: 2023-08-10 | Stop reason: HOSPADM

## 2023-08-05 RX ORDER — LEVOFLOXACIN 500 MG/1
500 TABLET, FILM COATED ORAL ONCE
Status: DISCONTINUED | OUTPATIENT
Start: 2023-08-05 | End: 2023-08-05

## 2023-08-05 RX ORDER — PREDNISONE 20 MG/1
40 TABLET ORAL DAILY
Status: DISCONTINUED | OUTPATIENT
Start: 2023-08-08 | End: 2023-08-05 | Stop reason: CLARIF

## 2023-08-05 RX ORDER — 0.9 % SODIUM CHLORIDE 0.9 %
250 INTRAVENOUS SOLUTION INTRAVENOUS ONCE
Status: COMPLETED | OUTPATIENT
Start: 2023-08-05 | End: 2023-08-05

## 2023-08-05 RX ORDER — SODIUM CHLORIDE 9 MG/ML
1000 INJECTION, SOLUTION INTRAVENOUS CONTINUOUS
Status: DISCONTINUED | OUTPATIENT
Start: 2023-08-05 | End: 2023-08-05

## 2023-08-05 RX ORDER — SODIUM CHLORIDE 9 MG/ML
INJECTION, SOLUTION INTRAVENOUS CONTINUOUS
Status: DISCONTINUED | OUTPATIENT
Start: 2023-08-05 | End: 2023-08-05 | Stop reason: SDUPTHER

## 2023-08-05 RX ORDER — METHYLPREDNISOLONE SODIUM SUCCINATE 1 G/16ML
40 INJECTION, POWDER, LYOPHILIZED, FOR SOLUTION INTRAMUSCULAR; INTRAVENOUS EVERY 6 HOURS
Status: DISCONTINUED | OUTPATIENT
Start: 2023-08-06 | End: 2023-08-05

## 2023-08-05 RX ORDER — CLOPIDOGREL BISULFATE 75 MG/1
75 TABLET ORAL DAILY
Status: DISCONTINUED | OUTPATIENT
Start: 2023-08-06 | End: 2023-08-10 | Stop reason: HOSPADM

## 2023-08-05 RX ORDER — ONDANSETRON 2 MG/ML
4 INJECTION INTRAMUSCULAR; INTRAVENOUS EVERY 6 HOURS PRN
Status: DISCONTINUED | OUTPATIENT
Start: 2023-08-05 | End: 2023-08-10 | Stop reason: HOSPADM

## 2023-08-05 RX ORDER — DULOXETIN HYDROCHLORIDE 60 MG/1
60 CAPSULE, DELAYED RELEASE ORAL 2 TIMES DAILY
Status: DISCONTINUED | OUTPATIENT
Start: 2023-08-05 | End: 2023-08-10 | Stop reason: HOSPADM

## 2023-08-05 RX ORDER — IPRATROPIUM BROMIDE AND ALBUTEROL SULFATE 2.5; .5 MG/3ML; MG/3ML
2 SOLUTION RESPIRATORY (INHALATION) ONCE
Status: COMPLETED | OUTPATIENT
Start: 2023-08-05 | End: 2023-08-05

## 2023-08-05 RX ORDER — DEXTROSE MONOHYDRATE 100 MG/ML
INJECTION, SOLUTION INTRAVENOUS CONTINUOUS PRN
Status: DISCONTINUED | OUTPATIENT
Start: 2023-08-05 | End: 2023-08-10 | Stop reason: HOSPADM

## 2023-08-05 RX ORDER — IPRATROPIUM BROMIDE AND ALBUTEROL SULFATE 2.5; .5 MG/3ML; MG/3ML
1 SOLUTION RESPIRATORY (INHALATION) 2 TIMES DAILY
Status: DISCONTINUED | OUTPATIENT
Start: 2023-08-05 | End: 2023-08-10 | Stop reason: HOSPADM

## 2023-08-05 RX ORDER — IPRATROPIUM BROMIDE AND ALBUTEROL SULFATE 2.5; .5 MG/3ML; MG/3ML
1 SOLUTION RESPIRATORY (INHALATION) EVERY 6 HOURS PRN
Status: DISCONTINUED | OUTPATIENT
Start: 2023-08-05 | End: 2023-08-10 | Stop reason: HOSPADM

## 2023-08-05 RX ORDER — METHYLPREDNISOLONE SODIUM SUCCINATE 1 G/16ML
125 INJECTION, POWDER, LYOPHILIZED, FOR SOLUTION INTRAMUSCULAR; INTRAVENOUS ONCE
Status: COMPLETED | OUTPATIENT
Start: 2023-08-05 | End: 2023-08-05

## 2023-08-05 RX ORDER — POLYETHYLENE GLYCOL 3350 17 G/17G
17 POWDER, FOR SOLUTION ORAL DAILY PRN
Status: DISCONTINUED | OUTPATIENT
Start: 2023-08-05 | End: 2023-08-10 | Stop reason: HOSPADM

## 2023-08-05 RX ORDER — SODIUM CHLORIDE 0.9 % (FLUSH) 0.9 %
5-40 SYRINGE (ML) INJECTION EVERY 12 HOURS SCHEDULED
Status: DISCONTINUED | OUTPATIENT
Start: 2023-08-05 | End: 2023-08-10 | Stop reason: HOSPADM

## 2023-08-05 RX ORDER — INSULIN LISPRO 100 [IU]/ML
0-4 INJECTION, SOLUTION INTRAVENOUS; SUBCUTANEOUS
Status: DISCONTINUED | OUTPATIENT
Start: 2023-08-05 | End: 2023-08-06

## 2023-08-05 RX ORDER — PREDNISONE 20 MG/1
40 TABLET ORAL DAILY
Status: DISCONTINUED | OUTPATIENT
Start: 2023-08-08 | End: 2023-08-05

## 2023-08-05 RX ORDER — INSULIN GLARGINE 100 [IU]/ML
15 INJECTION, SOLUTION SUBCUTANEOUS 2 TIMES DAILY
Status: DISCONTINUED | OUTPATIENT
Start: 2023-08-05 | End: 2023-08-06

## 2023-08-05 RX ORDER — ENOXAPARIN SODIUM 100 MG/ML
40 INJECTION SUBCUTANEOUS NIGHTLY
Status: DISCONTINUED | OUTPATIENT
Start: 2023-08-05 | End: 2023-08-05

## 2023-08-05 RX ORDER — ACETAMINOPHEN 325 MG/1
650 TABLET ORAL EVERY 6 HOURS PRN
Status: DISCONTINUED | OUTPATIENT
Start: 2023-08-05 | End: 2023-08-10 | Stop reason: HOSPADM

## 2023-08-05 RX ORDER — INSULIN LISPRO 100 [IU]/ML
5 INJECTION, SOLUTION INTRAVENOUS; SUBCUTANEOUS ONCE
Status: DISCONTINUED | OUTPATIENT
Start: 2023-08-05 | End: 2023-08-05

## 2023-08-05 RX ORDER — GABAPENTIN 300 MG/1
300 CAPSULE ORAL 2 TIMES DAILY
Status: DISCONTINUED | OUTPATIENT
Start: 2023-08-05 | End: 2023-08-10 | Stop reason: HOSPADM

## 2023-08-05 RX ORDER — PANTOPRAZOLE SODIUM 40 MG/1
40 TABLET, DELAYED RELEASE ORAL
Status: DISCONTINUED | OUTPATIENT
Start: 2023-08-06 | End: 2023-08-10 | Stop reason: HOSPADM

## 2023-08-05 RX ORDER — 0.9 % SODIUM CHLORIDE 0.9 %
500 INTRAVENOUS SOLUTION INTRAVENOUS ONCE
Status: COMPLETED | OUTPATIENT
Start: 2023-08-05 | End: 2023-08-05

## 2023-08-05 RX ORDER — INSULIN LISPRO 100 [IU]/ML
0-4 INJECTION, SOLUTION INTRAVENOUS; SUBCUTANEOUS NIGHTLY
Status: DISCONTINUED | OUTPATIENT
Start: 2023-08-05 | End: 2023-08-06

## 2023-08-05 RX ORDER — LATANOPROST 50 UG/ML
1 SOLUTION/ DROPS OPHTHALMIC NIGHTLY
Status: DISCONTINUED | OUTPATIENT
Start: 2023-08-05 | End: 2023-08-10 | Stop reason: HOSPADM

## 2023-08-05 RX ORDER — ONDANSETRON 4 MG/1
4 TABLET, ORALLY DISINTEGRATING ORAL EVERY 8 HOURS PRN
Status: DISCONTINUED | OUTPATIENT
Start: 2023-08-05 | End: 2023-08-10 | Stop reason: HOSPADM

## 2023-08-05 RX ORDER — FUROSEMIDE 10 MG/ML
40 INJECTION INTRAMUSCULAR; INTRAVENOUS 2 TIMES DAILY
Status: DISCONTINUED | OUTPATIENT
Start: 2023-08-05 | End: 2023-08-09

## 2023-08-05 RX ORDER — SODIUM CHLORIDE 0.9 % (FLUSH) 0.9 %
5-40 SYRINGE (ML) INJECTION PRN
Status: DISCONTINUED | OUTPATIENT
Start: 2023-08-05 | End: 2023-08-10 | Stop reason: HOSPADM

## 2023-08-05 RX ORDER — ATORVASTATIN CALCIUM 40 MG/1
40 TABLET, FILM COATED ORAL NIGHTLY
Status: DISCONTINUED | OUTPATIENT
Start: 2023-08-05 | End: 2023-08-10 | Stop reason: HOSPADM

## 2023-08-05 RX ADMIN — SODIUM CHLORIDE 500 ML: 9 INJECTION, SOLUTION INTRAVENOUS at 15:27

## 2023-08-05 RX ADMIN — INSULIN LISPRO 4 UNITS: 100 INJECTION, SOLUTION INTRAVENOUS; SUBCUTANEOUS at 23:03

## 2023-08-05 RX ADMIN — METHYLPREDNISOLONE SODIUM SUCCINATE 125 MG: 125 INJECTION INTRAMUSCULAR; INTRAVENOUS at 14:43

## 2023-08-05 RX ADMIN — FUROSEMIDE 40 MG: 10 INJECTION, SOLUTION INTRAMUSCULAR; INTRAVENOUS at 20:25

## 2023-08-05 RX ADMIN — IPRATROPIUM BROMIDE AND ALBUTEROL SULFATE 1 DOSE: 2.5; .5 SOLUTION RESPIRATORY (INHALATION) at 20:13

## 2023-08-05 RX ADMIN — LATANOPROST 1 DROP: 50 SOLUTION OPHTHALMIC at 23:04

## 2023-08-05 RX ADMIN — INSULIN HUMAN 6 UNITS: 100 INJECTION, SOLUTION PARENTERAL at 20:25

## 2023-08-05 RX ADMIN — DULOXETINE HYDROCHLORIDE 60 MG: 60 CAPSULE, DELAYED RELEASE ORAL at 23:03

## 2023-08-05 RX ADMIN — GABAPENTIN 300 MG: 300 CAPSULE ORAL at 23:03

## 2023-08-05 RX ADMIN — IPRATROPIUM BROMIDE AND ALBUTEROL SULFATE 2 DOSE: 2.5; .5 SOLUTION RESPIRATORY (INHALATION) at 14:43

## 2023-08-05 RX ADMIN — Medication 10 ML: at 23:05

## 2023-08-05 RX ADMIN — SODIUM CHLORIDE 1000 ML: 9 INJECTION, SOLUTION INTRAVENOUS at 15:44

## 2023-08-05 RX ADMIN — ATORVASTATIN CALCIUM 40 MG: 40 TABLET, FILM COATED ORAL at 23:03

## 2023-08-05 RX ADMIN — INSULIN GLARGINE 15 UNITS: 100 INJECTION, SOLUTION SUBCUTANEOUS at 23:04

## 2023-08-05 RX ADMIN — SODIUM CHLORIDE 250 ML: 9 INJECTION, SOLUTION INTRAVENOUS at 15:45

## 2023-08-05 ASSESSMENT — ENCOUNTER SYMPTOMS
SINUS PRESSURE: 0
RHINORRHEA: 0
WHEEZING: 1
CHEST TIGHTNESS: 0
ABDOMINAL PAIN: 0
SHORTNESS OF BREATH: 1
PHOTOPHOBIA: 0
NAUSEA: 0
EYE PAIN: 0
DIARRHEA: 0
VOMITING: 0
COUGH: 1
SINUS PAIN: 0
SORE THROAT: 0
CONSTIPATION: 0
TROUBLE SWALLOWING: 0
BACK PAIN: 0

## 2023-08-05 NOTE — ED NOTES
Writer told provider that pts BG was reading 320 on her device. Pt asked for some home insulin. Provider requested 5 units of humalog SQ. Writer did read back and put in verbal order per provider.       Kate Hurtado RN  08/05/23 2886

## 2023-08-05 NOTE — H&P
PRN  acetaminophen, 650 mg, Q6H PRN   Or  acetaminophen, 650 mg, Q6H PRN  glucose, 4 tablet, PRN  dextrose bolus, 125 mL, PRN   Or  dextrose bolus, 250 mL, PRN  glucagon (rDNA), 1 mg, PRN  dextrose, , Continuous PRN        Data:     CBC:   Recent Labs     08/05/23  1422   WBC 7.3   HGB 9.0*      MCV 90.2   RDW 14.6   LYMPHOPCT 10.3   MONOPCT 7.2   BASOPCT 0.7   MONOSABS 0.5   LYMPHSABS 0.8*   EOSABS 0.3   BASOSABS 0.1     CMP:    Recent Labs     08/05/23  1422   *   K 4.2   CL 91*   CO2 23   BUN 30*   CREATININE 1.9*   GLUCOSE 230*   LABALBU 3.2*   CALCIUM 8.4   BILITOT 1.1*   ALKPHOS 81   AST 16   ALT 9*     Lipids:   Lab Results   Component Value Date/Time    CHOL 112 10/29/2021 08:23 AM    HDL 34 10/29/2021 08:23 AM    HDL 43 07/22/2010 09:28 AM    TRIG 85 10/29/2021 08:23 AM     Hemoglobin A1C:   Lab Results   Component Value Date/Time    LABA1C 8.4 02/18/2023 04:24 AM     TSH: No results found for: TSH  Troponin: No results found for: TROPONINT  BNP:   Recent Labs     08/05/23  1422   PROBNP 2,347*     Lactic Acid: No results for input(s): LACTA in the last 72 hours.   UA:  Lab Results   Component Value Date/Time    NITRU Negative 08/05/2023 03:11 PM    COLORU Yellow 08/05/2023 03:11 PM    PHUR 5.5 08/05/2023 03:11 PM    WBCUA 10-20 08/05/2023 03:11 PM    RBCUA 5-10 08/05/2023 03:11 PM    MUCUS Rare 08/01/2022 12:00 PM    BACTERIA 1+ 08/05/2023 03:11 PM    CLARITYU Clear 08/05/2023 03:11 PM    SPECGRAV 1.020 08/05/2023 03:11 PM    LEUKOCYTESUR TRACE 08/05/2023 03:11 PM    UROBILINOGEN 0.2 08/05/2023 03:11 PM    BILIRUBINUR Negative 08/05/2023 03:11 PM    BILIRUBINUR NEGATIVE 03/20/2010 12:52 PM    BLOODU TRACE-INTACT 08/05/2023 03:11 PM    GLUCOSEU Negative 08/05/2023 03:11 PM    GLUCOSEU NEGATIVE 03/20/2010 12:52 PM    KETUA Negative 08/05/2023 03:11 PM     Urine Cultures:   Lab Results   Component Value Date/Time    LABURIN No growth at 18 to 36 hours 10/10/2022 11:30 AM     Blood Cultures:   Lab

## 2023-08-05 NOTE — FLOWSHEET NOTE
Patient arrived to room 301 from ED. Vitals are stable. Admission questions and med rec competed. Patent is on RA and denies further needs. Patient oriented to room. Call light within reach.

## 2023-08-06 PROBLEM — R79.89 ELEVATED TROPONIN: Status: ACTIVE | Noted: 2023-08-06

## 2023-08-06 PROBLEM — R77.8 ELEVATED TROPONIN: Status: ACTIVE | Noted: 2023-08-06

## 2023-08-06 PROBLEM — I50.31 ACUTE HEART FAILURE WITH PRESERVED EJECTION FRACTION (HFPEF) (HCC): Status: ACTIVE | Noted: 2023-08-06

## 2023-08-06 LAB
ALBUMIN SERPL-MCNC: 3.3 G/DL (ref 3.4–5)
ALBUMIN/GLOB SERPL: 1.1 {RATIO} (ref 1.1–2.2)
ALP SERPL-CCNC: 81 U/L (ref 40–129)
ALT SERPL-CCNC: 10 U/L (ref 10–40)
ANION GAP SERPL CALCULATED.3IONS-SCNC: 15 MMOL/L (ref 3–16)
AST SERPL-CCNC: 16 U/L (ref 15–37)
BASOPHILS # BLD: 0 K/UL (ref 0–0.2)
BASOPHILS NFR BLD: 0.2 %
BILIRUB SERPL-MCNC: 0.7 MG/DL (ref 0–1)
BUN SERPL-MCNC: 38 MG/DL (ref 7–20)
CALCIUM SERPL-MCNC: 8.5 MG/DL (ref 8.3–10.6)
CHLORIDE SERPL-SCNC: 91 MMOL/L (ref 99–110)
CO2 SERPL-SCNC: 22 MMOL/L (ref 21–32)
CREAT SERPL-MCNC: 2.1 MG/DL (ref 0.6–1.2)
DEPRECATED RDW RBC AUTO: 14.4 % (ref 12.4–15.4)
EOSINOPHIL # BLD: 0 K/UL (ref 0–0.6)
EOSINOPHIL NFR BLD: 0 %
FERRITIN SERPL IA-MCNC: 197.3 NG/ML (ref 15–150)
FOLATE SERPL-MCNC: 8.06 NG/ML (ref 4.78–24.2)
GFR SERPLBLD CREATININE-BSD FMLA CKD-EPI: 25 ML/MIN/{1.73_M2}
GLUCOSE BLD-MCNC: 180 MG/DL (ref 70–99)
GLUCOSE BLD-MCNC: 220 MG/DL (ref 70–99)
GLUCOSE BLD-MCNC: 307 MG/DL (ref 70–99)
GLUCOSE BLD-MCNC: 415 MG/DL (ref 70–99)
GLUCOSE BLD-MCNC: 432 MG/DL (ref 70–99)
GLUCOSE BLD-MCNC: 446 MG/DL (ref 70–99)
GLUCOSE BLD-MCNC: 463 MG/DL (ref 70–99)
GLUCOSE BLD-MCNC: 470 MG/DL (ref 70–99)
GLUCOSE SERPL-MCNC: 480 MG/DL (ref 70–99)
HCT VFR BLD AUTO: 25.9 % (ref 36–48)
HCT VFR BLD AUTO: 27.1 % (ref 36–48)
HGB BLD-MCNC: 9.2 G/DL (ref 12–16)
IMMATURE RETIC FRACT: 0.36 (ref 0.21–0.37)
INR PPP: 1.37 (ref 0.84–1.16)
IRON SATN MFR SERPL: 7 % (ref 15–50)
IRON SERPL-MCNC: 13 UG/DL (ref 37–145)
LYMPHOCYTES # BLD: 0.3 K/UL (ref 1–5.1)
LYMPHOCYTES NFR BLD: 2.7 %
MCH RBC QN AUTO: 30.5 PG (ref 26–34)
MCHC RBC AUTO-ENTMCNC: 34.1 G/DL (ref 31–36)
MCV RBC AUTO: 89.5 FL (ref 80–100)
MONOCYTES # BLD: 0.4 K/UL (ref 0–1.3)
MONOCYTES NFR BLD: 4.3 %
NEUTROPHILS # BLD: 9.3 K/UL (ref 1.7–7.7)
NEUTROPHILS NFR BLD: 92.8 %
PERFORMED ON: ABNORMAL
PLATELET # BLD AUTO: 274 K/UL (ref 135–450)
PMV BLD AUTO: 7.7 FL (ref 5–10.5)
POTASSIUM SERPL-SCNC: 4.3 MMOL/L (ref 3.5–5.1)
PROT SERPL-MCNC: 6.3 G/DL (ref 6.4–8.2)
PROTHROMBIN TIME: 16.9 SEC (ref 11.5–14.8)
RBC # BLD AUTO: 3.03 M/UL (ref 4–5.2)
RETICS # AUTO: 0.05 M/UL (ref 0.02–0.1)
RETICS/RBC NFR AUTO: 1.78 % (ref 0.5–2.18)
SODIUM SERPL-SCNC: 128 MMOL/L (ref 136–145)
TIBC SERPL-MCNC: 189 UG/DL (ref 260–445)
TSH SERPL DL<=0.005 MIU/L-ACNC: 0.87 UIU/ML (ref 0.27–4.2)
VIT B12 SERPL-MCNC: 820 PG/ML (ref 211–911)
WBC # BLD AUTO: 10 K/UL (ref 4–11)

## 2023-08-06 PROCEDURE — 6370000000 HC RX 637 (ALT 250 FOR IP): Performed by: INTERNAL MEDICINE

## 2023-08-06 PROCEDURE — 82746 ASSAY OF FOLIC ACID SERUM: CPT

## 2023-08-06 PROCEDURE — 94640 AIRWAY INHALATION TREATMENT: CPT

## 2023-08-06 PROCEDURE — 6370000000 HC RX 637 (ALT 250 FOR IP): Performed by: STUDENT IN AN ORGANIZED HEALTH CARE EDUCATION/TRAINING PROGRAM

## 2023-08-06 PROCEDURE — 85025 COMPLETE CBC W/AUTO DIFF WBC: CPT

## 2023-08-06 PROCEDURE — 99223 1ST HOSP IP/OBS HIGH 75: CPT | Performed by: INTERNAL MEDICINE

## 2023-08-06 PROCEDURE — 2060000000 HC ICU INTERMEDIATE R&B

## 2023-08-06 PROCEDURE — 85610 PROTHROMBIN TIME: CPT

## 2023-08-06 PROCEDURE — 82607 VITAMIN B-12: CPT

## 2023-08-06 PROCEDURE — 83550 IRON BINDING TEST: CPT

## 2023-08-06 PROCEDURE — 36415 COLL VENOUS BLD VENIPUNCTURE: CPT

## 2023-08-06 PROCEDURE — 80053 COMPREHEN METABOLIC PANEL: CPT

## 2023-08-06 PROCEDURE — 84443 ASSAY THYROID STIM HORMONE: CPT

## 2023-08-06 PROCEDURE — 94761 N-INVAS EAR/PLS OXIMETRY MLT: CPT

## 2023-08-06 PROCEDURE — 83540 ASSAY OF IRON: CPT

## 2023-08-06 PROCEDURE — 6360000002 HC RX W HCPCS: Performed by: STUDENT IN AN ORGANIZED HEALTH CARE EDUCATION/TRAINING PROGRAM

## 2023-08-06 PROCEDURE — 99233 SBSQ HOSP IP/OBS HIGH 50: CPT | Performed by: INTERNAL MEDICINE

## 2023-08-06 PROCEDURE — 82728 ASSAY OF FERRITIN: CPT

## 2023-08-06 PROCEDURE — 2580000003 HC RX 258: Performed by: INTERNAL MEDICINE

## 2023-08-06 PROCEDURE — 85045 AUTOMATED RETICULOCYTE COUNT: CPT

## 2023-08-06 RX ORDER — INSULIN GLARGINE 100 [IU]/ML
40 INJECTION, SOLUTION SUBCUTANEOUS 2 TIMES DAILY
Status: DISCONTINUED | OUTPATIENT
Start: 2023-08-06 | End: 2023-08-06

## 2023-08-06 RX ORDER — ROFLUMILAST 500 UG/1
500 TABLET ORAL DAILY
Status: DISCONTINUED | OUTPATIENT
Start: 2023-08-06 | End: 2023-08-10 | Stop reason: HOSPADM

## 2023-08-06 RX ORDER — INSULIN GLARGINE 100 [IU]/ML
20 INJECTION, SOLUTION SUBCUTANEOUS ONCE
Status: COMPLETED | OUTPATIENT
Start: 2023-08-06 | End: 2023-08-06

## 2023-08-06 RX ORDER — INSULIN LISPRO 100 [IU]/ML
0-4 INJECTION, SOLUTION INTRAVENOUS; SUBCUTANEOUS NIGHTLY
Status: DISCONTINUED | OUTPATIENT
Start: 2023-08-06 | End: 2023-08-08

## 2023-08-06 RX ORDER — DOCUSATE SODIUM 100 MG/1
100 CAPSULE, LIQUID FILLED ORAL 2 TIMES DAILY PRN
Status: DISCONTINUED | OUTPATIENT
Start: 2023-08-06 | End: 2023-08-10 | Stop reason: HOSPADM

## 2023-08-06 RX ORDER — INSULIN GLARGINE 100 [IU]/ML
20 INJECTION, SOLUTION SUBCUTANEOUS 2 TIMES DAILY
Status: DISCONTINUED | OUTPATIENT
Start: 2023-08-06 | End: 2023-08-06

## 2023-08-06 RX ORDER — METOPROLOL SUCCINATE 25 MG/1
12.5 TABLET, EXTENDED RELEASE ORAL DAILY
Status: DISCONTINUED | OUTPATIENT
Start: 2023-08-06 | End: 2023-08-10 | Stop reason: HOSPADM

## 2023-08-06 RX ORDER — INSULIN LISPRO 100 [IU]/ML
0-16 INJECTION, SOLUTION INTRAVENOUS; SUBCUTANEOUS
Status: DISCONTINUED | OUTPATIENT
Start: 2023-08-06 | End: 2023-08-08

## 2023-08-06 RX ORDER — SPIRONOLACTONE 25 MG/1
12.5 TABLET ORAL DAILY
Status: DISCONTINUED | OUTPATIENT
Start: 2023-08-06 | End: 2023-08-10 | Stop reason: HOSPADM

## 2023-08-06 RX ORDER — MORPHINE SULFATE 15 MG/1
15 TABLET, FILM COATED, EXTENDED RELEASE ORAL 2 TIMES DAILY
Status: DISCONTINUED | OUTPATIENT
Start: 2023-08-06 | End: 2023-08-10 | Stop reason: HOSPADM

## 2023-08-06 RX ORDER — BUDESONIDE AND FORMOTEROL FUMARATE DIHYDRATE 160; 4.5 UG/1; UG/1
2 AEROSOL RESPIRATORY (INHALATION)
Status: DISCONTINUED | OUTPATIENT
Start: 2023-08-06 | End: 2023-08-10 | Stop reason: HOSPADM

## 2023-08-06 RX ORDER — PREDNISONE 1 MG/1
4 TABLET ORAL
Status: DISCONTINUED | OUTPATIENT
Start: 2023-08-06 | End: 2023-08-07

## 2023-08-06 RX ORDER — OXYCODONE AND ACETAMINOPHEN 10; 325 MG/1; MG/1
1 TABLET ORAL DAILY PRN
Status: DISCONTINUED | OUTPATIENT
Start: 2023-08-06 | End: 2023-08-10 | Stop reason: HOSPADM

## 2023-08-06 RX ORDER — INSULIN GLARGINE 100 [IU]/ML
20 INJECTION, SOLUTION SUBCUTANEOUS 2 TIMES DAILY
Status: DISCONTINUED | OUTPATIENT
Start: 2023-08-06 | End: 2023-08-08

## 2023-08-06 RX ADMIN — INSULIN HUMAN 10 UNITS: 100 INJECTION, SOLUTION PARENTERAL at 02:54

## 2023-08-06 RX ADMIN — DULOXETINE HYDROCHLORIDE 60 MG: 60 CAPSULE, DELAYED RELEASE ORAL at 20:56

## 2023-08-06 RX ADMIN — CLOPIDOGREL BISULFATE 75 MG: 75 TABLET ORAL at 08:24

## 2023-08-06 RX ADMIN — INSULIN LISPRO 16 UNITS: 100 INJECTION, SOLUTION INTRAVENOUS; SUBCUTANEOUS at 08:23

## 2023-08-06 RX ADMIN — ATORVASTATIN CALCIUM 40 MG: 40 TABLET, FILM COATED ORAL at 20:56

## 2023-08-06 RX ADMIN — ROFLUMILAST 500 MCG: 500 TABLET ORAL at 08:24

## 2023-08-06 RX ADMIN — NYSTATIN 500000 UNITS: 100000 SUSPENSION ORAL at 18:08

## 2023-08-06 RX ADMIN — PANTOPRAZOLE SODIUM 40 MG: 40 TABLET, DELAYED RELEASE ORAL at 07:11

## 2023-08-06 RX ADMIN — DULOXETINE HYDROCHLORIDE 60 MG: 60 CAPSULE, DELAYED RELEASE ORAL at 08:24

## 2023-08-06 RX ADMIN — MORPHINE SULFATE 15 MG: 15 TABLET, FILM COATED, EXTENDED RELEASE ORAL at 20:58

## 2023-08-06 RX ADMIN — ACETAMINOPHEN 650 MG: 325 TABLET ORAL at 18:08

## 2023-08-06 RX ADMIN — Medication 10 ML: at 21:11

## 2023-08-06 RX ADMIN — GABAPENTIN 300 MG: 300 CAPSULE ORAL at 20:56

## 2023-08-06 RX ADMIN — INSULIN GLARGINE 20 UNITS: 100 INJECTION, SOLUTION SUBCUTANEOUS at 08:23

## 2023-08-06 RX ADMIN — PREDNISONE 4 MG: 1 TABLET ORAL at 08:32

## 2023-08-06 RX ADMIN — Medication 2 PUFF: at 07:21

## 2023-08-06 RX ADMIN — INSULIN LISPRO 4 UNITS: 100 INJECTION, SOLUTION INTRAVENOUS; SUBCUTANEOUS at 18:08

## 2023-08-06 RX ADMIN — INSULIN HUMAN 10 UNITS: 100 INJECTION, SOLUTION PARENTERAL at 06:15

## 2023-08-06 RX ADMIN — IPRATROPIUM BROMIDE AND ALBUTEROL SULFATE 1 DOSE: 2.5; .5 SOLUTION RESPIRATORY (INHALATION) at 07:12

## 2023-08-06 RX ADMIN — INSULIN LISPRO 16 UNITS: 100 INJECTION, SOLUTION INTRAVENOUS; SUBCUTANEOUS at 12:19

## 2023-08-06 RX ADMIN — FUROSEMIDE 40 MG: 10 INJECTION, SOLUTION INTRAMUSCULAR; INTRAVENOUS at 08:24

## 2023-08-06 RX ADMIN — NYSTATIN 500000 UNITS: 100000 SUSPENSION ORAL at 21:00

## 2023-08-06 RX ADMIN — IPRATROPIUM BROMIDE AND ALBUTEROL SULFATE 1 DOSE: 2.5; .5 SOLUTION RESPIRATORY (INHALATION) at 20:15

## 2023-08-06 RX ADMIN — INSULIN GLARGINE 20 UNITS: 100 INJECTION, SOLUTION SUBCUTANEOUS at 12:19

## 2023-08-06 RX ADMIN — ENOXAPARIN SODIUM 30 MG: 100 INJECTION SUBCUTANEOUS at 20:56

## 2023-08-06 RX ADMIN — Medication 10 ML: at 08:27

## 2023-08-06 RX ADMIN — Medication 2 PUFF: at 20:15

## 2023-08-06 RX ADMIN — INSULIN GLARGINE 20 UNITS: 100 INJECTION, SOLUTION SUBCUTANEOUS at 20:56

## 2023-08-06 RX ADMIN — MORPHINE SULFATE 15 MG: 15 TABLET, FILM COATED, EXTENDED RELEASE ORAL at 08:25

## 2023-08-06 RX ADMIN — TIOTROPIUM BROMIDE INHALATION SPRAY 2 PUFF: 3.12 SPRAY, METERED RESPIRATORY (INHALATION) at 07:19

## 2023-08-06 RX ADMIN — GABAPENTIN 300 MG: 300 CAPSULE ORAL at 08:24

## 2023-08-06 RX ADMIN — LATANOPROST 1 DROP: 50 SOLUTION OPHTHALMIC at 21:06

## 2023-08-06 RX ADMIN — FUROSEMIDE 40 MG: 10 INJECTION, SOLUTION INTRAMUSCULAR; INTRAVENOUS at 18:08

## 2023-08-06 ASSESSMENT — PAIN DESCRIPTION - DESCRIPTORS
DESCRIPTORS: ACHING;THROBBING
DESCRIPTORS: ACHING;STABBING
DESCRIPTORS: ACHING

## 2023-08-06 ASSESSMENT — PAIN DESCRIPTION - LOCATION
LOCATION: ARM;SHOULDER
LOCATION: BACK
LOCATION: HAND;SHOULDER;BACK

## 2023-08-06 ASSESSMENT — PAIN SCALES - GENERAL
PAINLEVEL_OUTOF10: 8
PAINLEVEL_OUTOF10: 8

## 2023-08-06 ASSESSMENT — PAIN DESCRIPTION - ORIENTATION
ORIENTATION: LEFT;RIGHT
ORIENTATION: RIGHT;LEFT

## 2023-08-06 NOTE — ACP (ADVANCE CARE PLANNING)
Advance Care Planning     General Advance Care Planning (ACP) Conversation    Date of Conversation: 8/5/2023  Conducted with: Patient with Decision Making Capacity    Healthcare Decision Maker:    Primary Decision Maker: Guero Lizzy Child - [phone]    Secondary Decision Maker: Rea Sewell - Child - [phone]  Click here to complete Healthcare Decision Makers including selection of the Healthcare Decision Maker Relationship (ie \"Primary\"). Today we documented Decision Maker(s) consistent with ACP documents on file. Content/Action Overview:   Has ACP document(s) on file - reflects the patient's care preferences  Reviewed DNR/DNI and patient elects Full Code (Attempt Resuscitation)        Length of Voluntary ACP Conversation in minutes:  <16 minutes (Non-Billable)    Tabitha Pollard RN

## 2023-08-06 NOTE — CARE COORDINATION
Case Management Assessment  Initial Evaluation    Date/Time of Evaluation: 8/6/2023 2:32 PM  Assessment Completed by: Tala Gonsales RN    If patient is discharged prior to next notation, then this note serves as note for discharge by case management. Patient Name: Stevie Kincaid                   YOB: 1950  Diagnosis: Acidosis [E87.20]  Hyponatremia [E87.1]  Elevated troponin [R77.8]  COPD exacerbation ([address]) [J44.1]  Acute kidney injury ([address]) [N17.9]                   Date / Time: 8/5/2023  1:19 PM    Patient Admission Status: Inpatient   Readmission Risk (Low < 19, Mod (19-27), High > 27): Readmission Risk Score: 19.6    Current PCP: Jono Ugalde MD  PCP verified by CM? Yes [facility])    Chart Reviewed: Yes      History Provided by: Patient  Patient Orientation: Alert and Oriented    Patient Cognition: Alert    Hospitalization in the last 30 days (Readmission):  No    If yes, Readmission Assessment in  Navigator will be completed.     Advance Directives:      Code Status: Full Code   Patient's Primary Decision Maker is: Named in [address]     Primary Decision MakerJared [facility] Child - [phone]    Secondary Decision Maker: [facility] Child - [phone]    Discharge Planning:    Patient lives with: Alone, Other (Comment) (Mari White currently living with pt but will leave soon for college) Type of Home: House  Primary Care Giver: Self  Patient Support Systems include: Children, Family Members   Current Financial resources: None  Current community resources: None  Current services prior to admission: [facility] Care (active [facility] [address] )            Current DME: Oxygen Therapy (Comment), Gio Conradi, Other (Comment) ([address] for home O2 PRN, has rolliator)            Type of Home Care services:  Nursing Services    ADLS  Prior functional level: Assistance with the following:, Shopping, Other (see comment), Mobility (laundry)  Current functional

## 2023-08-07 PROBLEM — I50.33 ACUTE ON CHRONIC DIASTOLIC CHF (CONGESTIVE HEART FAILURE) (HCC): Status: ACTIVE | Noted: 2023-08-07

## 2023-08-07 LAB
ANION GAP SERPL CALCULATED.3IONS-SCNC: 12 MMOL/L (ref 3–16)
BACTERIA UR CULT: ABNORMAL
BASOPHILS # BLD: 0 K/UL (ref 0–0.2)
BASOPHILS NFR BLD: 0.1 %
BUN SERPL-MCNC: 41 MG/DL (ref 7–20)
CALCIUM SERPL-MCNC: 8.8 MG/DL (ref 8.3–10.6)
CHLORIDE SERPL-SCNC: 97 MMOL/L (ref 99–110)
CO2 SERPL-SCNC: 25 MMOL/L (ref 21–32)
CREAT SERPL-MCNC: 1.5 MG/DL (ref 0.6–1.2)
DEPRECATED RDW RBC AUTO: 14.5 % (ref 12.4–15.4)
EOSINOPHIL # BLD: 0 K/UL (ref 0–0.6)
EOSINOPHIL NFR BLD: 0 %
EST. AVERAGE GLUCOSE BLD GHB EST-MCNC: 177.2 MG/DL
GFR SERPLBLD CREATININE-BSD FMLA CKD-EPI: 37 ML/MIN/{1.73_M2}
GLUCOSE BLD-MCNC: 110 MG/DL (ref 70–99)
GLUCOSE BLD-MCNC: 145 MG/DL (ref 70–99)
GLUCOSE BLD-MCNC: 149 MG/DL (ref 70–99)
GLUCOSE BLD-MCNC: 159 MG/DL (ref 70–99)
GLUCOSE BLD-MCNC: 302 MG/DL (ref 70–99)
GLUCOSE BLD-MCNC: 59 MG/DL (ref 70–99)
GLUCOSE BLD-MCNC: 62 MG/DL (ref 70–99)
GLUCOSE BLD-MCNC: 72 MG/DL (ref 70–99)
GLUCOSE SERPL-MCNC: 135 MG/DL (ref 70–99)
HBA1C MFR BLD: 7.8 %
HCT VFR BLD AUTO: 24.3 % (ref 36–48)
HGB BLD-MCNC: 8.4 G/DL (ref 12–16)
LYMPHOCYTES # BLD: 0.6 K/UL (ref 1–5.1)
LYMPHOCYTES NFR BLD: 6.9 %
MCH RBC QN AUTO: 30.4 PG (ref 26–34)
MCHC RBC AUTO-ENTMCNC: 34.7 G/DL (ref 31–36)
MCV RBC AUTO: 87.5 FL (ref 80–100)
MONOCYTES # BLD: 0.6 K/UL (ref 0–1.3)
MONOCYTES NFR BLD: 6.7 %
NEUTROPHILS # BLD: 8 K/UL (ref 1.7–7.7)
NEUTROPHILS NFR BLD: 86.3 %
ORGANISM: ABNORMAL
PERFORMED ON: ABNORMAL
PERFORMED ON: NORMAL
PLATELET # BLD AUTO: 297 K/UL (ref 135–450)
PMV BLD AUTO: 7.7 FL (ref 5–10.5)
POTASSIUM SERPL-SCNC: 4 MMOL/L (ref 3.5–5.1)
RBC # BLD AUTO: 2.78 M/UL (ref 4–5.2)
SODIUM SERPL-SCNC: 134 MMOL/L (ref 136–145)
WBC # BLD AUTO: 9.3 K/UL (ref 4–11)

## 2023-08-07 PROCEDURE — 99233 SBSQ HOSP IP/OBS HIGH 50: CPT | Performed by: NURSE PRACTITIONER

## 2023-08-07 PROCEDURE — 94010 BREATHING CAPACITY TEST: CPT

## 2023-08-07 PROCEDURE — 2060000000 HC ICU INTERMEDIATE R&B

## 2023-08-07 PROCEDURE — 99223 1ST HOSP IP/OBS HIGH 75: CPT | Performed by: INTERNAL MEDICINE

## 2023-08-07 PROCEDURE — 6370000000 HC RX 637 (ALT 250 FOR IP): Performed by: STUDENT IN AN ORGANIZED HEALTH CARE EDUCATION/TRAINING PROGRAM

## 2023-08-07 PROCEDURE — 2580000003 HC RX 258: Performed by: INTERNAL MEDICINE

## 2023-08-07 PROCEDURE — 6360000002 HC RX W HCPCS: Performed by: STUDENT IN AN ORGANIZED HEALTH CARE EDUCATION/TRAINING PROGRAM

## 2023-08-07 PROCEDURE — 6360000002 HC RX W HCPCS: Performed by: INTERNAL MEDICINE

## 2023-08-07 PROCEDURE — 6370000000 HC RX 637 (ALT 250 FOR IP): Performed by: INTERNAL MEDICINE

## 2023-08-07 PROCEDURE — 6370000000 HC RX 637 (ALT 250 FOR IP)

## 2023-08-07 PROCEDURE — 99232 SBSQ HOSP IP/OBS MODERATE 35: CPT | Performed by: INTERNAL MEDICINE

## 2023-08-07 PROCEDURE — 36415 COLL VENOUS BLD VENIPUNCTURE: CPT

## 2023-08-07 PROCEDURE — 80048 BASIC METABOLIC PNL TOTAL CA: CPT

## 2023-08-07 PROCEDURE — 85025 COMPLETE CBC W/AUTO DIFF WBC: CPT

## 2023-08-07 PROCEDURE — 94761 N-INVAS EAR/PLS OXIMETRY MLT: CPT

## 2023-08-07 PROCEDURE — 94640 AIRWAY INHALATION TREATMENT: CPT

## 2023-08-07 RX ORDER — PREDNISONE 20 MG/1
20 TABLET ORAL DAILY
Status: DISCONTINUED | OUTPATIENT
Start: 2023-08-08 | End: 2023-08-08

## 2023-08-07 RX ORDER — GUAIFENESIN/DEXTROMETHORPHAN 100-10MG/5
5 SYRUP ORAL EVERY 4 HOURS PRN
Status: DISCONTINUED | OUTPATIENT
Start: 2023-08-07 | End: 2023-08-07

## 2023-08-07 RX ORDER — BENZONATATE 100 MG/1
100 CAPSULE ORAL 3 TIMES DAILY PRN
Status: DISCONTINUED | OUTPATIENT
Start: 2023-08-07 | End: 2023-08-10 | Stop reason: HOSPADM

## 2023-08-07 RX ORDER — SULFAMETHOXAZOLE AND TRIMETHOPRIM 800; 160 MG/1; MG/1
1 TABLET ORAL EVERY 12 HOURS SCHEDULED
Status: DISCONTINUED | OUTPATIENT
Start: 2023-08-07 | End: 2023-08-09

## 2023-08-07 RX ORDER — ENOXAPARIN SODIUM 100 MG/ML
40 INJECTION SUBCUTANEOUS NIGHTLY
Status: DISCONTINUED | OUTPATIENT
Start: 2023-08-07 | End: 2023-08-10 | Stop reason: HOSPADM

## 2023-08-07 RX ORDER — AZITHROMYCIN 250 MG/1
250 TABLET, FILM COATED ORAL DAILY
Status: DISCONTINUED | OUTPATIENT
Start: 2023-08-07 | End: 2023-08-10 | Stop reason: HOSPADM

## 2023-08-07 RX ADMIN — AZITHROMYCIN 250 MG: 250 TABLET, FILM COATED ORAL at 12:11

## 2023-08-07 RX ADMIN — GABAPENTIN 300 MG: 300 CAPSULE ORAL at 09:46

## 2023-08-07 RX ADMIN — INSULIN LISPRO 12 UNITS: 100 INJECTION, SOLUTION INTRAVENOUS; SUBCUTANEOUS at 12:12

## 2023-08-07 RX ADMIN — PREDNISONE 4 MG: 1 TABLET ORAL at 10:11

## 2023-08-07 RX ADMIN — NYSTATIN 500000 UNITS: 100000 SUSPENSION ORAL at 14:50

## 2023-08-07 RX ADMIN — BENZONATATE 100 MG: 100 CAPSULE ORAL at 12:25

## 2023-08-07 RX ADMIN — PANTOPRAZOLE SODIUM 40 MG: 40 TABLET, DELAYED RELEASE ORAL at 05:19

## 2023-08-07 RX ADMIN — DULOXETINE HYDROCHLORIDE 60 MG: 60 CAPSULE, DELAYED RELEASE ORAL at 20:53

## 2023-08-07 RX ADMIN — MORPHINE SULFATE 15 MG: 15 TABLET, FILM COATED, EXTENDED RELEASE ORAL at 20:53

## 2023-08-07 RX ADMIN — METOPROLOL SUCCINATE 12.5 MG: 25 TABLET, EXTENDED RELEASE ORAL at 09:47

## 2023-08-07 RX ADMIN — CLOPIDOGREL BISULFATE 75 MG: 75 TABLET ORAL at 09:47

## 2023-08-07 RX ADMIN — SULFAMETHOXAZOLE AND TRIMETHOPRIM 1 TABLET: 800; 160 TABLET ORAL at 20:53

## 2023-08-07 RX ADMIN — ROFLUMILAST 500 MCG: 500 TABLET ORAL at 09:46

## 2023-08-07 RX ADMIN — INSULIN GLARGINE 20 UNITS: 100 INJECTION, SOLUTION SUBCUTANEOUS at 20:56

## 2023-08-07 RX ADMIN — INSULIN GLARGINE 20 UNITS: 100 INJECTION, SOLUTION SUBCUTANEOUS at 09:48

## 2023-08-07 RX ADMIN — Medication 16 G: at 17:38

## 2023-08-07 RX ADMIN — Medication 10 ML: at 09:49

## 2023-08-07 RX ADMIN — NYSTATIN 500000 UNITS: 100000 SUSPENSION ORAL at 17:31

## 2023-08-07 RX ADMIN — IPRATROPIUM BROMIDE AND ALBUTEROL SULFATE 1 DOSE: 2.5; .5 SOLUTION RESPIRATORY (INHALATION) at 07:12

## 2023-08-07 RX ADMIN — MORPHINE SULFATE 15 MG: 15 TABLET, FILM COATED, EXTENDED RELEASE ORAL at 09:47

## 2023-08-07 RX ADMIN — ATORVASTATIN CALCIUM 40 MG: 40 TABLET, FILM COATED ORAL at 20:53

## 2023-08-07 RX ADMIN — GABAPENTIN 300 MG: 300 CAPSULE ORAL at 20:53

## 2023-08-07 RX ADMIN — FUROSEMIDE 40 MG: 10 INJECTION, SOLUTION INTRAMUSCULAR; INTRAVENOUS at 17:31

## 2023-08-07 RX ADMIN — IPRATROPIUM BROMIDE AND ALBUTEROL SULFATE 1 DOSE: 2.5; .5 SOLUTION RESPIRATORY (INHALATION) at 20:55

## 2023-08-07 RX ADMIN — ENOXAPARIN SODIUM 40 MG: 100 INJECTION SUBCUTANEOUS at 20:53

## 2023-08-07 RX ADMIN — Medication 10 ML: at 20:58

## 2023-08-07 RX ADMIN — NYSTATIN 500000 UNITS: 100000 SUSPENSION ORAL at 21:31

## 2023-08-07 RX ADMIN — NYSTATIN 500000 UNITS: 100000 SUSPENSION ORAL at 09:47

## 2023-08-07 RX ADMIN — LATANOPROST 1 DROP: 50 SOLUTION OPHTHALMIC at 21:33

## 2023-08-07 RX ADMIN — DULOXETINE HYDROCHLORIDE 60 MG: 60 CAPSULE, DELAYED RELEASE ORAL at 09:47

## 2023-08-07 RX ADMIN — FUROSEMIDE 40 MG: 10 INJECTION, SOLUTION INTRAMUSCULAR; INTRAVENOUS at 09:48

## 2023-08-07 ASSESSMENT — COPD QUESTIONNAIRES
CAT_TOTALSCORE: 26
QUESTION6_LEAVINGHOUSE: 5
QUESTION7_SLEEPQUALITY: 3
QUESTION8_ENERGYLEVEL: 1
QUESTION3_CHESTTIGHTNESS: 0
QUESTION4_WALKINCLINE: 5
QUESTION2_CHESTPHLEGM: 4
QUESTION1_COUGHFREQUENCY: 4
QUESTION5_HOMEACTIVITIES: 4

## 2023-08-07 ASSESSMENT — PAIN DESCRIPTION - DESCRIPTORS
DESCRIPTORS: ACHING;DISCOMFORT
DESCRIPTORS: ACHING;DISCOMFORT

## 2023-08-07 ASSESSMENT — PAIN DESCRIPTION - ORIENTATION: ORIENTATION: MID

## 2023-08-07 ASSESSMENT — PAIN SCALES - GENERAL
PAINLEVEL_OUTOF10: 8
PAINLEVEL_OUTOF10: 8
PAINLEVEL_OUTOF10: 0

## 2023-08-07 ASSESSMENT — ENCOUNTER SYMPTOMS
GASTROINTESTINAL NEGATIVE: 1
SHORTNESS OF BREATH: 1

## 2023-08-07 ASSESSMENT — PAIN DESCRIPTION - LOCATION
LOCATION: BACK
LOCATION: BACK

## 2023-08-07 NOTE — CARE COORDINATION
Case Management/Follow up:    Chart reviewed for length of stay. Hospital day #  2 Unit:  PCU    Diagnosis and current status as per MD progress:COPD exacerbation     Anticipated d/c date: 1-2 days    Expected plan for discharge: Home /[facility]    Potential barriers: none    Precert required/date initiated: N/A    Confirmed plan with patient and/or family: yes    Comments: Patient needs more diuresis today and tomorrow. Will return home with no needs. Alan Cardona RN

## 2023-08-07 NOTE — DISCHARGE INSTRUCTIONS
.     Your nurse  is Mily Lo RN     If we applied slip-resistant hospital socks today, be sure to remove them at least once a day to inspect your toes or feet, even if you're not changing the wraps or dressings underneath. If you see anything concerning (redness, excess moisture, etc), please call and let us know right away. Should you experience any significant changes in your wound(s) (including redness, increased warmth, increased pain, increased drainage, odor, or fever) or have questions about your wound care, please contact the [facility] at [phone] Monday-Thursday from 8:00 am - 4:30 pm, or Friday from 8:00 am - 2:30 pm.  If you need help with your wound outside these hours and cannot wait until we are again available, contact your home-care company (if applicable), your PCP, or go to the nearest emergency room.

## 2023-08-07 NOTE — FLOWSHEET NOTE
08/07/23 0706   Handoff   Communication Given Shift Handoff   Handoff Given To Adam Lundy RN   Handoff Received From Ferny Otero RN   Handoff Communication Face to Face   Time Handoff Given 7362     Report given to Adam Lundy, [address] [place] [address]. Call light is within reach, pt is stable. Care is transferred at this time.

## 2023-08-08 LAB
ANION GAP SERPL CALCULATED.3IONS-SCNC: 12 MMOL/L (ref 3–16)
BASOPHILS # BLD: 0 K/UL (ref 0–0.2)
BASOPHILS NFR BLD: 0.2 %
BUN SERPL-MCNC: 34 MG/DL (ref 7–20)
CALCIUM SERPL-MCNC: 8.5 MG/DL (ref 8.3–10.6)
CHLORIDE SERPL-SCNC: 98 MMOL/L (ref 99–110)
CO2 SERPL-SCNC: 26 MMOL/L (ref 21–32)
CREAT SERPL-MCNC: 1.2 MG/DL (ref 0.6–1.2)
DEPRECATED RDW RBC AUTO: 14.7 % (ref 12.4–15.4)
EOSINOPHIL # BLD: 0.1 K/UL (ref 0–0.6)
EOSINOPHIL NFR BLD: 1.4 %
GFR SERPLBLD CREATININE-BSD FMLA CKD-EPI: 48 ML/MIN/{1.73_M2}
GLUCOSE BLD-MCNC: 105 MG/DL (ref 70–99)
GLUCOSE BLD-MCNC: 125 MG/DL (ref 70–99)
GLUCOSE BLD-MCNC: 261 MG/DL (ref 70–99)
GLUCOSE BLD-MCNC: 277 MG/DL (ref 70–99)
GLUCOSE BLD-MCNC: 323 MG/DL (ref 70–99)
GLUCOSE BLD-MCNC: 55 MG/DL (ref 70–99)
GLUCOSE BLD-MCNC: 77 MG/DL (ref 70–99)
GLUCOSE SERPL-MCNC: 64 MG/DL (ref 70–99)
HCT VFR BLD AUTO: 27.5 % (ref 36–48)
HGB BLD-MCNC: 9.3 G/DL (ref 12–16)
LYMPHOCYTES # BLD: 1.4 K/UL (ref 1–5.1)
LYMPHOCYTES NFR BLD: 25 %
MCH RBC QN AUTO: 30 PG (ref 26–34)
MCHC RBC AUTO-ENTMCNC: 33.9 G/DL (ref 31–36)
MCV RBC AUTO: 88.6 FL (ref 80–100)
MONOCYTES # BLD: 0.4 K/UL (ref 0–1.3)
MONOCYTES NFR BLD: 7.7 %
NEUTROPHILS # BLD: 3.6 K/UL (ref 1.7–7.7)
NEUTROPHILS NFR BLD: 65.7 %
ORGANISM: ABNORMAL
ORGANISM: ABNORMAL
PERFORMED ON: ABNORMAL
PERFORMED ON: NORMAL
PLATELET # BLD AUTO: 301 K/UL (ref 135–450)
PMV BLD AUTO: 7.4 FL (ref 5–10.5)
PNEUMONIA PANEL MOLECULAR: ABNORMAL
PNEUMONIA PANEL MOLECULAR: ABNORMAL
POTASSIUM SERPL-SCNC: 3.8 MMOL/L (ref 3.5–5.1)
PROCALCITONIN SERPL IA-MCNC: 6.9 NG/ML (ref 0–0.15)
RBC # BLD AUTO: 3.11 M/UL (ref 4–5.2)
REPORT: NORMAL
SODIUM SERPL-SCNC: 136 MMOL/L (ref 136–145)
WBC # BLD AUTO: 5.4 K/UL (ref 4–11)

## 2023-08-08 PROCEDURE — 87633 RESP VIRUS 12-25 TARGETS: CPT

## 2023-08-08 PROCEDURE — 6360000002 HC RX W HCPCS: Performed by: INTERNAL MEDICINE

## 2023-08-08 PROCEDURE — 99233 SBSQ HOSP IP/OBS HIGH 50: CPT | Performed by: INTERNAL MEDICINE

## 2023-08-08 PROCEDURE — 36415 COLL VENOUS BLD VENIPUNCTURE: CPT

## 2023-08-08 PROCEDURE — 80048 BASIC METABOLIC PNL TOTAL CA: CPT

## 2023-08-08 PROCEDURE — 6370000000 HC RX 637 (ALT 250 FOR IP): Performed by: INTERNAL MEDICINE

## 2023-08-08 PROCEDURE — 6360000002 HC RX W HCPCS: Performed by: STUDENT IN AN ORGANIZED HEALTH CARE EDUCATION/TRAINING PROGRAM

## 2023-08-08 PROCEDURE — 6370000000 HC RX 637 (ALT 250 FOR IP)

## 2023-08-08 PROCEDURE — 2580000003 HC RX 258: Performed by: INTERNAL MEDICINE

## 2023-08-08 PROCEDURE — 85025 COMPLETE CBC W/AUTO DIFF WBC: CPT

## 2023-08-08 PROCEDURE — 94761 N-INVAS EAR/PLS OXIMETRY MLT: CPT

## 2023-08-08 PROCEDURE — 94640 AIRWAY INHALATION TREATMENT: CPT

## 2023-08-08 PROCEDURE — 6370000000 HC RX 637 (ALT 250 FOR IP): Performed by: STUDENT IN AN ORGANIZED HEALTH CARE EDUCATION/TRAINING PROGRAM

## 2023-08-08 PROCEDURE — 99233 SBSQ HOSP IP/OBS HIGH 50: CPT | Performed by: NURSE PRACTITIONER

## 2023-08-08 PROCEDURE — 84145 PROCALCITONIN (PCT): CPT

## 2023-08-08 PROCEDURE — 2060000000 HC ICU INTERMEDIATE R&B

## 2023-08-08 PROCEDURE — 99232 SBSQ HOSP IP/OBS MODERATE 35: CPT | Performed by: INTERNAL MEDICINE

## 2023-08-08 RX ORDER — PREDNISONE 20 MG/1
40 TABLET ORAL DAILY
Status: DISCONTINUED | OUTPATIENT
Start: 2023-08-09 | End: 2023-08-10

## 2023-08-08 RX ORDER — INSULIN GLARGINE 100 [IU]/ML
10 INJECTION, SOLUTION SUBCUTANEOUS 2 TIMES DAILY
Status: DISCONTINUED | OUTPATIENT
Start: 2023-08-08 | End: 2023-08-10 | Stop reason: HOSPADM

## 2023-08-08 RX ORDER — INSULIN LISPRO 100 [IU]/ML
0-8 INJECTION, SOLUTION INTRAVENOUS; SUBCUTANEOUS
Status: DISCONTINUED | OUTPATIENT
Start: 2023-08-08 | End: 2023-08-10 | Stop reason: HOSPADM

## 2023-08-08 RX ORDER — INSULIN LISPRO 100 [IU]/ML
0-4 INJECTION, SOLUTION INTRAVENOUS; SUBCUTANEOUS NIGHTLY
Status: DISCONTINUED | OUTPATIENT
Start: 2023-08-08 | End: 2023-08-10 | Stop reason: HOSPADM

## 2023-08-08 RX ADMIN — IPRATROPIUM BROMIDE AND ALBUTEROL SULFATE 1 DOSE: 2.5; .5 SOLUTION RESPIRATORY (INHALATION) at 21:06

## 2023-08-08 RX ADMIN — Medication 10 ML: at 08:39

## 2023-08-08 RX ADMIN — MORPHINE SULFATE 15 MG: 15 TABLET, FILM COATED, EXTENDED RELEASE ORAL at 20:42

## 2023-08-08 RX ADMIN — TIOTROPIUM BROMIDE INHALATION SPRAY 2 PUFF: 3.12 SPRAY, METERED RESPIRATORY (INHALATION) at 07:28

## 2023-08-08 RX ADMIN — PANTOPRAZOLE SODIUM 40 MG: 40 TABLET, DELAYED RELEASE ORAL at 06:42

## 2023-08-08 RX ADMIN — AZITHROMYCIN 250 MG: 250 TABLET, FILM COATED ORAL at 08:39

## 2023-08-08 RX ADMIN — CLOPIDOGREL BISULFATE 75 MG: 75 TABLET ORAL at 08:39

## 2023-08-08 RX ADMIN — BENZONATATE 100 MG: 100 CAPSULE ORAL at 09:06

## 2023-08-08 RX ADMIN — LATANOPROST 1 DROP: 50 SOLUTION OPHTHALMIC at 20:49

## 2023-08-08 RX ADMIN — Medication 16 G: at 04:58

## 2023-08-08 RX ADMIN — METOPROLOL SUCCINATE 12.5 MG: 25 TABLET, EXTENDED RELEASE ORAL at 08:38

## 2023-08-08 RX ADMIN — GABAPENTIN 300 MG: 300 CAPSULE ORAL at 20:42

## 2023-08-08 RX ADMIN — BENZONATATE 100 MG: 100 CAPSULE ORAL at 23:31

## 2023-08-08 RX ADMIN — ATORVASTATIN CALCIUM 40 MG: 40 TABLET, FILM COATED ORAL at 20:49

## 2023-08-08 RX ADMIN — SACUBITRIL AND VALSARTAN 0.5 TABLET: 24; 26 TABLET, FILM COATED ORAL at 20:42

## 2023-08-08 RX ADMIN — NYSTATIN 500000 UNITS: 100000 SUSPENSION ORAL at 20:43

## 2023-08-08 RX ADMIN — INSULIN LISPRO 4 UNITS: 100 INJECTION, SOLUTION INTRAVENOUS; SUBCUTANEOUS at 20:43

## 2023-08-08 RX ADMIN — SULFAMETHOXAZOLE AND TRIMETHOPRIM 1 TABLET: 800; 160 TABLET ORAL at 20:43

## 2023-08-08 RX ADMIN — DULOXETINE HYDROCHLORIDE 60 MG: 60 CAPSULE, DELAYED RELEASE ORAL at 20:42

## 2023-08-08 RX ADMIN — INSULIN GLARGINE 10 UNITS: 100 INJECTION, SOLUTION SUBCUTANEOUS at 09:06

## 2023-08-08 RX ADMIN — NYSTATIN 500000 UNITS: 100000 SUSPENSION ORAL at 16:36

## 2023-08-08 RX ADMIN — INSULIN GLARGINE 10 UNITS: 100 INJECTION, SOLUTION SUBCUTANEOUS at 20:43

## 2023-08-08 RX ADMIN — PREDNISONE 20 MG: 20 TABLET ORAL at 08:39

## 2023-08-08 RX ADMIN — SULFAMETHOXAZOLE AND TRIMETHOPRIM 1 TABLET: 800; 160 TABLET ORAL at 08:39

## 2023-08-08 RX ADMIN — MORPHINE SULFATE 15 MG: 15 TABLET, FILM COATED, EXTENDED RELEASE ORAL at 08:39

## 2023-08-08 RX ADMIN — FUROSEMIDE 40 MG: 10 INJECTION, SOLUTION INTRAMUSCULAR; INTRAVENOUS at 18:59

## 2023-08-08 RX ADMIN — DULOXETINE HYDROCHLORIDE 60 MG: 60 CAPSULE, DELAYED RELEASE ORAL at 08:39

## 2023-08-08 RX ADMIN — FUROSEMIDE 40 MG: 10 INJECTION, SOLUTION INTRAMUSCULAR; INTRAVENOUS at 08:38

## 2023-08-08 RX ADMIN — NYSTATIN 500000 UNITS: 100000 SUSPENSION ORAL at 08:39

## 2023-08-08 RX ADMIN — ROFLUMILAST 500 MCG: 500 TABLET ORAL at 08:39

## 2023-08-08 RX ADMIN — NYSTATIN 500000 UNITS: 100000 SUSPENSION ORAL at 13:18

## 2023-08-08 RX ADMIN — Medication 10 ML: at 20:50

## 2023-08-08 RX ADMIN — INSULIN LISPRO 4 UNITS: 100 INJECTION, SOLUTION INTRAVENOUS; SUBCUTANEOUS at 16:37

## 2023-08-08 RX ADMIN — IPRATROPIUM BROMIDE AND ALBUTEROL SULFATE 1 DOSE: 2.5; .5 SOLUTION RESPIRATORY (INHALATION) at 07:27

## 2023-08-08 RX ADMIN — ENOXAPARIN SODIUM 40 MG: 100 INJECTION SUBCUTANEOUS at 20:43

## 2023-08-08 RX ADMIN — GABAPENTIN 300 MG: 300 CAPSULE ORAL at 08:39

## 2023-08-08 RX ADMIN — BENZONATATE 100 MG: 100 CAPSULE ORAL at 00:02

## 2023-08-08 RX ADMIN — INSULIN LISPRO 4 UNITS: 100 INJECTION, SOLUTION INTRAVENOUS; SUBCUTANEOUS at 11:59

## 2023-08-08 ASSESSMENT — PAIN DESCRIPTION - DESCRIPTORS: DESCRIPTORS: THROBBING

## 2023-08-08 ASSESSMENT — ENCOUNTER SYMPTOMS
SHORTNESS OF BREATH: 1
GASTROINTESTINAL NEGATIVE: 1

## 2023-08-08 ASSESSMENT — PAIN SCALES - GENERAL
PAINLEVEL_OUTOF10: 6
PAINLEVEL_OUTOF10: 10

## 2023-08-08 ASSESSMENT — PAIN DESCRIPTION - LOCATION: LOCATION: BACK

## 2023-08-08 ASSESSMENT — PAIN - FUNCTIONAL ASSESSMENT: PAIN_FUNCTIONAL_ASSESSMENT: ACTIVITIES ARE NOT PREVENTED

## 2023-08-08 ASSESSMENT — PAIN DESCRIPTION - ORIENTATION: ORIENTATION: LOWER

## 2023-08-08 NOTE — FLOWSHEET NOTE
08/08/23 0726   Handoff   Communication Given Shift Handoff   Handoff Given To ABRAHAM Hooks   Handoff Received From Joaquim Rodney RN   Handoff Communication Face to Face   Time Handoff Given 1218     Report given to Raine Hooks. Pt in bed, call light is within reach. Pt denies any needs at this time.  Pt is stable, care is transferred

## 2023-08-09 PROBLEM — J15.212 PNEUMONIA DUE TO METHICILLIN RESISTANT STAPHYLOCOCCUS AUREUS (MRSA) (HCC): Status: ACTIVE | Noted: 2023-08-09

## 2023-08-09 LAB
ANION GAP SERPL CALCULATED.3IONS-SCNC: 12 MMOL/L (ref 3–16)
BACTERIA BLD CULT ORG #2: NORMAL
BACTERIA BLD CULT: NORMAL
BASOPHILS # BLD: 0 K/UL (ref 0–0.2)
BASOPHILS NFR BLD: 0.3 %
BUN SERPL-MCNC: 26 MG/DL (ref 7–20)
CALCIUM SERPL-MCNC: 8.5 MG/DL (ref 8.3–10.6)
CHLORIDE SERPL-SCNC: 98 MMOL/L (ref 99–110)
CO2 SERPL-SCNC: 26 MMOL/L (ref 21–32)
CREAT SERPL-MCNC: 1.2 MG/DL (ref 0.6–1.2)
DEPRECATED RDW RBC AUTO: 14.3 % (ref 12.4–15.4)
EOSINOPHIL # BLD: 0.1 K/UL (ref 0–0.6)
EOSINOPHIL NFR BLD: 1.3 %
GFR SERPLBLD CREATININE-BSD FMLA CKD-EPI: 48 ML/MIN/{1.73_M2}
GLUCOSE BLD-MCNC: 153 MG/DL (ref 70–99)
GLUCOSE BLD-MCNC: 220 MG/DL (ref 70–99)
GLUCOSE BLD-MCNC: 269 MG/DL (ref 70–99)
GLUCOSE BLD-MCNC: 381 MG/DL (ref 70–99)
GLUCOSE BLD-MCNC: 393 MG/DL (ref 70–99)
GLUCOSE SERPL-MCNC: 175 MG/DL (ref 70–99)
HCT VFR BLD AUTO: 28.2 % (ref 36–48)
HGB BLD-MCNC: 9.7 G/DL (ref 12–16)
LYMPHOCYTES # BLD: 1.1 K/UL (ref 1–5.1)
LYMPHOCYTES NFR BLD: 22.2 %
MCH RBC QN AUTO: 30.4 PG (ref 26–34)
MCHC RBC AUTO-ENTMCNC: 34.3 G/DL (ref 31–36)
MCV RBC AUTO: 88.4 FL (ref 80–100)
MONOCYTES # BLD: 0.4 K/UL (ref 0–1.3)
MONOCYTES NFR BLD: 8.3 %
NEUTROPHILS # BLD: 3.5 K/UL (ref 1.7–7.7)
NEUTROPHILS NFR BLD: 67.9 %
PERFORMED ON: ABNORMAL
PLATELET # BLD AUTO: 307 K/UL (ref 135–450)
PMV BLD AUTO: 7.4 FL (ref 5–10.5)
POTASSIUM SERPL-SCNC: 3.7 MMOL/L (ref 3.5–5.1)
RBC # BLD AUTO: 3.19 M/UL (ref 4–5.2)
SODIUM SERPL-SCNC: 136 MMOL/L (ref 136–145)
WBC # BLD AUTO: 5.1 K/UL (ref 4–11)

## 2023-08-09 PROCEDURE — 6370000000 HC RX 637 (ALT 250 FOR IP): Performed by: INTERNAL MEDICINE

## 2023-08-09 PROCEDURE — 6360000002 HC RX W HCPCS: Performed by: INTERNAL MEDICINE

## 2023-08-09 PROCEDURE — 94640 AIRWAY INHALATION TREATMENT: CPT

## 2023-08-09 PROCEDURE — 6370000000 HC RX 637 (ALT 250 FOR IP)

## 2023-08-09 PROCEDURE — 2060000000 HC ICU INTERMEDIATE R&B

## 2023-08-09 PROCEDURE — 80048 BASIC METABOLIC PNL TOTAL CA: CPT

## 2023-08-09 PROCEDURE — 94761 N-INVAS EAR/PLS OXIMETRY MLT: CPT

## 2023-08-09 PROCEDURE — 6370000000 HC RX 637 (ALT 250 FOR IP): Performed by: STUDENT IN AN ORGANIZED HEALTH CARE EDUCATION/TRAINING PROGRAM

## 2023-08-09 PROCEDURE — 85025 COMPLETE CBC W/AUTO DIFF WBC: CPT

## 2023-08-09 PROCEDURE — 99233 SBSQ HOSP IP/OBS HIGH 50: CPT | Performed by: INTERNAL MEDICINE

## 2023-08-09 PROCEDURE — 2580000003 HC RX 258: Performed by: INTERNAL MEDICINE

## 2023-08-09 PROCEDURE — 36415 COLL VENOUS BLD VENIPUNCTURE: CPT

## 2023-08-09 PROCEDURE — 99232 SBSQ HOSP IP/OBS MODERATE 35: CPT | Performed by: NURSE PRACTITIONER

## 2023-08-09 PROCEDURE — 6360000002 HC RX W HCPCS: Performed by: STUDENT IN AN ORGANIZED HEALTH CARE EDUCATION/TRAINING PROGRAM

## 2023-08-09 RX ORDER — TORSEMIDE 20 MG/1
20 TABLET ORAL DAILY
Status: DISCONTINUED | OUTPATIENT
Start: 2023-08-09 | End: 2023-08-10 | Stop reason: HOSPADM

## 2023-08-09 RX ORDER — ACETAMINOPHEN 650 MG
TABLET, EXTENDED RELEASE ORAL DAILY
Status: DISCONTINUED | OUTPATIENT
Start: 2023-08-09 | End: 2023-08-10 | Stop reason: HOSPADM

## 2023-08-09 RX ADMIN — MORPHINE SULFATE 15 MG: 15 TABLET, FILM COATED, EXTENDED RELEASE ORAL at 20:34

## 2023-08-09 RX ADMIN — CLOPIDOGREL BISULFATE 75 MG: 75 TABLET ORAL at 09:06

## 2023-08-09 RX ADMIN — INSULIN LISPRO 2 UNITS: 100 INJECTION, SOLUTION INTRAVENOUS; SUBCUTANEOUS at 12:21

## 2023-08-09 RX ADMIN — ROFLUMILAST 500 MCG: 500 TABLET ORAL at 09:06

## 2023-08-09 RX ADMIN — INSULIN GLARGINE 10 UNITS: 100 INJECTION, SOLUTION SUBCUTANEOUS at 09:06

## 2023-08-09 RX ADMIN — Medication 10 ML: at 20:37

## 2023-08-09 RX ADMIN — GABAPENTIN 300 MG: 300 CAPSULE ORAL at 20:35

## 2023-08-09 RX ADMIN — PANTOPRAZOLE SODIUM 40 MG: 40 TABLET, DELAYED RELEASE ORAL at 06:23

## 2023-08-09 RX ADMIN — NYSTATIN 500000 UNITS: 100000 SUSPENSION ORAL at 09:06

## 2023-08-09 RX ADMIN — MORPHINE SULFATE 15 MG: 15 TABLET, FILM COATED, EXTENDED RELEASE ORAL at 09:06

## 2023-08-09 RX ADMIN — SILVER NITRATE APPLICATORS 1 EACH: 25; 75 STICK TOPICAL at 09:08

## 2023-08-09 RX ADMIN — AZITHROMYCIN 250 MG: 250 TABLET, FILM COATED ORAL at 09:05

## 2023-08-09 RX ADMIN — INSULIN GLARGINE 10 UNITS: 100 INJECTION, SOLUTION SUBCUTANEOUS at 20:35

## 2023-08-09 RX ADMIN — ATORVASTATIN CALCIUM 40 MG: 40 TABLET, FILM COATED ORAL at 20:34

## 2023-08-09 RX ADMIN — GABAPENTIN 300 MG: 300 CAPSULE ORAL at 09:05

## 2023-08-09 RX ADMIN — VANCOMYCIN HYDROCHLORIDE 1250 MG: 10 INJECTION, POWDER, LYOPHILIZED, FOR SOLUTION INTRAVENOUS at 13:48

## 2023-08-09 RX ADMIN — NYSTATIN 500000 UNITS: 100000 SUSPENSION ORAL at 12:21

## 2023-08-09 RX ADMIN — VANCOMYCIN HYDROCHLORIDE 1750 MG: 10 INJECTION, POWDER, LYOPHILIZED, FOR SOLUTION INTRAVENOUS at 01:51

## 2023-08-09 RX ADMIN — DULOXETINE HYDROCHLORIDE 60 MG: 60 CAPSULE, DELAYED RELEASE ORAL at 20:33

## 2023-08-09 RX ADMIN — NYSTATIN 500000 UNITS: 100000 SUSPENSION ORAL at 17:01

## 2023-08-09 RX ADMIN — IPRATROPIUM BROMIDE AND ALBUTEROL SULFATE 1 DOSE: 2.5; .5 SOLUTION RESPIRATORY (INHALATION) at 19:04

## 2023-08-09 RX ADMIN — SACUBITRIL AND VALSARTAN 0.5 TABLET: 24; 26 TABLET, FILM COATED ORAL at 09:06

## 2023-08-09 RX ADMIN — Medication 2 PUFF: at 07:14

## 2023-08-09 RX ADMIN — FUROSEMIDE 40 MG: 10 INJECTION, SOLUTION INTRAMUSCULAR; INTRAVENOUS at 09:06

## 2023-08-09 RX ADMIN — ENOXAPARIN SODIUM 40 MG: 100 INJECTION SUBCUTANEOUS at 20:33

## 2023-08-09 RX ADMIN — NYSTATIN 500000 UNITS: 100000 SUSPENSION ORAL at 20:33

## 2023-08-09 RX ADMIN — SPIRONOLACTONE 12.5 MG: 25 TABLET ORAL at 09:05

## 2023-08-09 RX ADMIN — IPRATROPIUM BROMIDE AND ALBUTEROL SULFATE 1 DOSE: 2.5; .5 SOLUTION RESPIRATORY (INHALATION) at 07:14

## 2023-08-09 RX ADMIN — SACUBITRIL AND VALSARTAN 0.5 TABLET: 24; 26 TABLET, FILM COATED ORAL at 20:34

## 2023-08-09 RX ADMIN — Medication 10 ML: at 09:09

## 2023-08-09 RX ADMIN — Medication: at 13:29

## 2023-08-09 RX ADMIN — INSULIN LISPRO 4 UNITS: 100 INJECTION, SOLUTION INTRAVENOUS; SUBCUTANEOUS at 20:35

## 2023-08-09 RX ADMIN — LATANOPROST 1 DROP: 50 SOLUTION OPHTHALMIC at 20:33

## 2023-08-09 RX ADMIN — Medication 2 PUFF: at 19:04

## 2023-08-09 RX ADMIN — TIOTROPIUM BROMIDE INHALATION SPRAY 2 PUFF: 3.12 SPRAY, METERED RESPIRATORY (INHALATION) at 07:34

## 2023-08-09 RX ADMIN — METOPROLOL SUCCINATE 12.5 MG: 25 TABLET, EXTENDED RELEASE ORAL at 09:06

## 2023-08-09 RX ADMIN — PREDNISONE 40 MG: 20 TABLET ORAL at 09:06

## 2023-08-09 RX ADMIN — INSULIN LISPRO 4 UNITS: 100 INJECTION, SOLUTION INTRAVENOUS; SUBCUTANEOUS at 17:01

## 2023-08-09 RX ADMIN — DULOXETINE HYDROCHLORIDE 60 MG: 60 CAPSULE, DELAYED RELEASE ORAL at 09:06

## 2023-08-09 RX ADMIN — SULFAMETHOXAZOLE AND TRIMETHOPRIM 1 TABLET: 800; 160 TABLET ORAL at 09:06

## 2023-08-09 RX ADMIN — BENZONATATE 100 MG: 100 CAPSULE ORAL at 09:24

## 2023-08-09 ASSESSMENT — PAIN SCALES - GENERAL
PAINLEVEL_OUTOF10: 7
PAINLEVEL_OUTOF10: 7

## 2023-08-09 ASSESSMENT — PAIN DESCRIPTION - ORIENTATION
ORIENTATION: LOWER
ORIENTATION: LOWER

## 2023-08-09 ASSESSMENT — PAIN DESCRIPTION - DESCRIPTORS
DESCRIPTORS: THROBBING
DESCRIPTORS: ACHING;DISCOMFORT

## 2023-08-09 ASSESSMENT — PAIN DESCRIPTION - LOCATION
LOCATION: BACK
LOCATION: BACK

## 2023-08-09 ASSESSMENT — ENCOUNTER SYMPTOMS
GASTROINTESTINAL NEGATIVE: 1
SHORTNESS OF BREATH: 1

## 2023-08-09 NOTE — CARE COORDINATION
INTERDISCIPLINARY PLAN OF CARE CONFERENCE    Date/Time: 8/9/2023 12:11 PM  Completed by: Siobhan Foster, Case Management      Patient Name:  Mike Oscar  YOB: 1950  Admitting Diagnosis: Acidosis [E87.20]  Hyponatremia [E87.1]  Elevated troponin [R77.8]  COPD exacerbation ([address]) [J44.1]  Acute kidney injury ([address]) [N17.9]     Admit Date/Time:  8/5/2023  1:19 PM    Chart reviewed. Interdisciplinary team contacted or reviewed plan related to patient progress and discharge plans. Disciplines included Case Management, Nursing, and Dietitian. Current Status: stable    PT/OT recommendation for discharge plan of care: home with [facility].    Expected D/C Disposition:  Home  Confirmed plan with patient and/or family Yes confirmed with: (name) Cari Jaskaran  Met with: patient    Discharge Plan Comments: Reviewed chart and met with pt at bedside. Pt to return home at [address] [place] [address] with [facility]. Called Servando at [employer] as pt needs nebulizer,she has been using her 's whose is 8years old. Plan for pt to be DC tomorrow. Home O2 in place on admit: Yes  Pt informed of need to bring portable home O2 tank on day of discharge for nursing to connect prior to leaving:  Yes  Verbalized agreement/Understanding:   Yes

## 2023-08-09 NOTE — FLOWSHEET NOTE
08/09/23 [address] [place] [address] [place] Given To Jose Murray RN   Handoff Received From Joaquim Rodney RN   Handoff Communication Face to Face   Time Handoff Given 4625     Report given to Raine Monroe. Pt in bed, call light within reach. Pt is stable, care is transferred.

## 2023-08-09 NOTE — CONSULTS
[facility]                                 Pulmonary Consult /Progress Note :                                                                  CHIEF COMPLAINT: Cough worsening shortness of breath    Consulting provider: Dr. Earl Pearl    HPI:   Patient is 41-year-old female with about 40 pack.   Smoking history,    She presented to the hospital with worsening shortness of breath along with cough and feeling congested that has been going on for the last few days    She is also known to have coronary artery disease s/p CABG 2021 as well as heart failure with preserved ejection fraction    She also complains of increased leg edema as well as orthopnea and increased weight    She stated that her cough sometimes productive of yellow sputum    Patient daughter at bedside    Past Medical History:   Diagnosis Date    Acute on chronic diastolic heart failure due to coronary artery disease ([facility])     Acute respiratory failure with hypoxia ([facility])     Atherosclerosis of native artery of right lower extremity with rest pain ([address]) 07/25/2017    Back pain     Branch retinal vein occlusion 07/20/2012    Bronchiectasis with acute exacerbation ([address])     Candidal dermatitis 5/1/2023    Cellulitis and abscess of left leg 7/11/2021    Cellulitis of left lower extremity 7/11/2021    S/P vein harvest 05/2021 for CABG    CHF (congestive heart failure) ([facility])     Closed compression fracture of thoracic vertebra ([address]) 01/15/2020    Closed fracture of facial bone with routine healing 11/21/2016    Closed jaw fracture ([address]) 01/15/2020    Community acquired pneumonia of left lower lobe of lung     Compression fracture of L1 lumbar vertebra ([address]) 01/15/2020    COPD (chronic obstructive pulmonary disease) ([facility])     COPD exacerbation ([address])     COVID     Fracture of tibial plateau, closed, left, initial encounter 12/05/2017    HCAP (healthcare-associated pneumonia)     Minimally displaced zone I
Pharmacy Note  Vancomycin Consult    Valorie Martinez is a 71 y.o. female started on Vancomycin for HAP (7 days); consult received from Dr. Marcial to manage therapy. Also receiving the following antibiotics: N/A. Micro: MRSA DNA positive on 8/8/23    Recent Labs     08/07/23  0605 08/08/23  0423   BUN 41* 34*   CREATININE 1.5* 1.2   WBC 9.3 5.4       Estimated Creatinine Clearance: 50 mL/min (based on SCr of 1.2 mg/dL). Goal Trough Level: 15-20 mcg/mL  Goal AUC: 400-600 mg/L    Assessment/Plan:  Will initiate Vancomycin with a one time loading dose of 1750 mg x1, followed by 1250 mg IV every 24 hours. Per Pk-insight:  AUC24,ss: 711 mg/L.hr  Probability of AUC24 > 400: 74 %  Ctrough,ss: 15.2 mg/L  Probability of Ctrough,ss > 20: 26 %  Probability of nephrotoxicity (Lodise ULYSSES 2009): 10 %    Next trough: 8/10 @ 1300    Thank you for the consult. Will continue to follow.     Pavan Boyer, PharmD, [facility], 8/9/2023 1:33 AM
12/22/2011      Active Hospital Problems    Diagnosis Date Noted    COPD exacerbation ([address]) [J44.1] 08/05/2023       Assessment:       Plan:    -Acute on chronic HFpEF    Patient had reduced EF before which has recovered. Although her proBNP is elevated, the levels seems to be the same as previous admissions and measurements. She has had weight gain and edema. Standard treatment for heart failure including continuation of IV diuretic. Monitor intake and output. Daily weights. Monitor and correct electrolytes. Monitor renal function. -ISI  Could be cardiorenal.  She has had similar episodes in the past.  Continue diuresis and monitor renal function and urine output. Avoid nephrotoxic drugs. Aldactone and Entresto have been put on hold. -Hyponatremia  Monitor. Continue diuresis. -CAD status post CABG and BIMAL clip  No acute ischemia. Continue Plavix, statin and beta-blocker    -Elevated troponin  Flat curve likely due to heart failure    -COPD and bronchiectasis  Patient need respiratory therapy with incentive spirometry to clear the secretions. Continue inhalers.      -Anemia  Stable. No evidence of bleeding. Maintain hemoglobin above 8. I independently reviewed echo   CXR    Patient with multiple chronic problems with acute  multiorgan exacerbation at high risk of acute mortality and morbidity. Thank you for allowing me to participate in the care of [address]     NOTE: This report was transcribed using voice recognition software. Every effort was made to ensure accuracy, however, inadvertent computerized transcription errors may be present.
Relief  [] Other:     Current Diet: ADULT DIET; Regular; 4 carb choices (60 gm/meal);  Low Sodium (2 gm); 2000 ml  Dietician consult:  Yes    Discharge Plan:  Placement for patient upon discharge: home with support    Patient appropriate for Outpatient [address]: Yes    Referrals:  []   [x] [facility]  [x] Supplies  [] Other    Patient/Caregiver Teaching:  Level of patient/caregiver understanding able to:   [] Indicates understanding       [] Needs reinforcement  [] Unsuccessful      [x] Verbal Understanding  [] Demonstrated understanding       [] No evidence of learning  [] Refused teaching         [] N/A       Electronically signed by Juan Roman RN, CWOCN on 8/8/2023 at 3:50 PM

## 2023-08-10 ENCOUNTER — APPOINTMENT (OUTPATIENT)
Dept: CT IMAGING | Age: 73
DRG: 291 | End: 2023-08-10
Payer: MEDICARE

## 2023-08-10 VITALS
TEMPERATURE: 98.5 F | SYSTOLIC BLOOD PRESSURE: 117 MMHG | HEIGHT: 69 IN | DIASTOLIC BLOOD PRESSURE: 64 MMHG | OXYGEN SATURATION: 93 % | BODY MASS INDEX: 26.67 KG/M2 | HEART RATE: 72 BPM | WEIGHT: 180.06 LBS | RESPIRATION RATE: 18 BRPM

## 2023-08-10 LAB
ANION GAP SERPL CALCULATED.3IONS-SCNC: 11 MMOL/L (ref 3–16)
BASOPHILS # BLD: 0 K/UL (ref 0–0.2)
BASOPHILS NFR BLD: 0.3 %
BUN SERPL-MCNC: 24 MG/DL (ref 7–20)
CALCIUM SERPL-MCNC: 8.1 MG/DL (ref 8.3–10.6)
CHLORIDE SERPL-SCNC: 96 MMOL/L (ref 99–110)
CO2 SERPL-SCNC: 27 MMOL/L (ref 21–32)
CREAT SERPL-MCNC: 1.2 MG/DL (ref 0.6–1.2)
DEPRECATED RDW RBC AUTO: 14.5 % (ref 12.4–15.4)
EOSINOPHIL # BLD: 0 K/UL (ref 0–0.6)
EOSINOPHIL NFR BLD: 0.3 %
GFR SERPLBLD CREATININE-BSD FMLA CKD-EPI: 48 ML/MIN/{1.73_M2}
GLUCOSE BLD-MCNC: 209 MG/DL (ref 70–99)
GLUCOSE BLD-MCNC: 216 MG/DL (ref 70–99)
GLUCOSE BLD-MCNC: 379 MG/DL (ref 70–99)
GLUCOSE SERPL-MCNC: 286 MG/DL (ref 70–99)
HCT VFR BLD AUTO: 28.8 % (ref 36–48)
HGB BLD-MCNC: 9.8 G/DL (ref 12–16)
LYMPHOCYTES # BLD: 0.9 K/UL (ref 1–5.1)
LYMPHOCYTES NFR BLD: 15.4 %
MCH RBC QN AUTO: 29.9 PG (ref 26–34)
MCHC RBC AUTO-ENTMCNC: 33.9 G/DL (ref 31–36)
MCV RBC AUTO: 88.2 FL (ref 80–100)
MONOCYTES # BLD: 0.4 K/UL (ref 0–1.3)
MONOCYTES NFR BLD: 7.8 %
NEUTROPHILS # BLD: 4.3 K/UL (ref 1.7–7.7)
NEUTROPHILS NFR BLD: 76.2 %
PERFORMED ON: ABNORMAL
PLATELET # BLD AUTO: 318 K/UL (ref 135–450)
PMV BLD AUTO: 7.4 FL (ref 5–10.5)
POTASSIUM SERPL-SCNC: 4.1 MMOL/L (ref 3.5–5.1)
RBC # BLD AUTO: 3.27 M/UL (ref 4–5.2)
SODIUM SERPL-SCNC: 134 MMOL/L (ref 136–145)
VANCOMYCIN TROUGH SERPL-MCNC: 15.8 UG/ML (ref 10–20)
WBC # BLD AUTO: 5.7 K/UL (ref 4–11)

## 2023-08-10 PROCEDURE — 6370000000 HC RX 637 (ALT 250 FOR IP): Performed by: INTERNAL MEDICINE

## 2023-08-10 PROCEDURE — 2580000003 HC RX 258: Performed by: INTERNAL MEDICINE

## 2023-08-10 PROCEDURE — 36415 COLL VENOUS BLD VENIPUNCTURE: CPT

## 2023-08-10 PROCEDURE — 85025 COMPLETE CBC W/AUTO DIFF WBC: CPT

## 2023-08-10 PROCEDURE — 99232 SBSQ HOSP IP/OBS MODERATE 35: CPT | Performed by: INTERNAL MEDICINE

## 2023-08-10 PROCEDURE — 6370000000 HC RX 637 (ALT 250 FOR IP)

## 2023-08-10 PROCEDURE — 6360000002 HC RX W HCPCS: Performed by: INTERNAL MEDICINE

## 2023-08-10 PROCEDURE — 6370000000 HC RX 637 (ALT 250 FOR IP): Performed by: NURSE PRACTITIONER

## 2023-08-10 PROCEDURE — 80048 BASIC METABOLIC PNL TOTAL CA: CPT

## 2023-08-10 PROCEDURE — 94640 AIRWAY INHALATION TREATMENT: CPT

## 2023-08-10 PROCEDURE — 94761 N-INVAS EAR/PLS OXIMETRY MLT: CPT

## 2023-08-10 PROCEDURE — 6370000000 HC RX 637 (ALT 250 FOR IP): Performed by: STUDENT IN AN ORGANIZED HEALTH CARE EDUCATION/TRAINING PROGRAM

## 2023-08-10 PROCEDURE — 80202 ASSAY OF VANCOMYCIN: CPT

## 2023-08-10 PROCEDURE — 71250 CT THORAX DX C-: CPT

## 2023-08-10 RX ORDER — BENZONATATE 100 MG/1
100 CAPSULE ORAL 3 TIMES DAILY PRN
Qty: 20 CAPSULE | Refills: 0 | Status: SHIPPED | OUTPATIENT
Start: 2023-08-10 | End: 2023-08-17

## 2023-08-10 RX ORDER — LINEZOLID 600 MG/1
600 TABLET, FILM COATED ORAL 2 TIMES DAILY
Qty: 10 TABLET | Refills: 0 | Status: SHIPPED | OUTPATIENT
Start: 2023-08-10 | End: 2023-08-15

## 2023-08-10 RX ORDER — PREDNISONE 10 MG/1
TABLET ORAL
Qty: 6 TABLET | Refills: 0 | Status: SHIPPED | OUTPATIENT
Start: 2023-08-11 | End: 2023-08-15

## 2023-08-10 RX ORDER — PREDNISONE 1 MG/1
4 TABLET ORAL DAILY
Qty: 120 TABLET | Refills: 0 | Status: SHIPPED
Start: 2023-08-10 | End: 2023-09-09

## 2023-08-10 RX ORDER — PREDNISONE 20 MG/1
20 TABLET ORAL DAILY
Status: DISCONTINUED | OUTPATIENT
Start: 2023-08-10 | End: 2023-08-10 | Stop reason: HOSPADM

## 2023-08-10 RX ADMIN — IPRATROPIUM BROMIDE AND ALBUTEROL SULFATE 1 DOSE: 2.5; .5 SOLUTION RESPIRATORY (INHALATION) at 07:30

## 2023-08-10 RX ADMIN — NYSTATIN 500000 UNITS: 100000 SUSPENSION ORAL at 08:43

## 2023-08-10 RX ADMIN — INSULIN LISPRO 2 UNITS: 100 INJECTION, SOLUTION INTRAVENOUS; SUBCUTANEOUS at 08:36

## 2023-08-10 RX ADMIN — Medication 2 PUFF: at 07:31

## 2023-08-10 RX ADMIN — PREDNISONE 20 MG: 20 TABLET ORAL at 08:43

## 2023-08-10 RX ADMIN — INSULIN LISPRO 8 UNITS: 100 INJECTION, SOLUTION INTRAVENOUS; SUBCUTANEOUS at 11:28

## 2023-08-10 RX ADMIN — SACUBITRIL AND VALSARTAN 0.5 TABLET: 24; 26 TABLET, FILM COATED ORAL at 08:42

## 2023-08-10 RX ADMIN — SPIRONOLACTONE 12.5 MG: 25 TABLET ORAL at 08:41

## 2023-08-10 RX ADMIN — GABAPENTIN 300 MG: 300 CAPSULE ORAL at 08:42

## 2023-08-10 RX ADMIN — DULOXETINE HYDROCHLORIDE 60 MG: 60 CAPSULE, DELAYED RELEASE ORAL at 08:42

## 2023-08-10 RX ADMIN — MORPHINE SULFATE 15 MG: 15 TABLET, FILM COATED, EXTENDED RELEASE ORAL at 08:40

## 2023-08-10 RX ADMIN — VANCOMYCIN HYDROCHLORIDE 1250 MG: 10 INJECTION, POWDER, LYOPHILIZED, FOR SOLUTION INTRAVENOUS at 14:01

## 2023-08-10 RX ADMIN — TIOTROPIUM BROMIDE INHALATION SPRAY 2 PUFF: 3.12 SPRAY, METERED RESPIRATORY (INHALATION) at 07:32

## 2023-08-10 RX ADMIN — CLOPIDOGREL BISULFATE 75 MG: 75 TABLET ORAL at 08:41

## 2023-08-10 RX ADMIN — METOPROLOL SUCCINATE 12.5 MG: 25 TABLET, EXTENDED RELEASE ORAL at 08:42

## 2023-08-10 RX ADMIN — BENZONATATE 100 MG: 100 CAPSULE ORAL at 00:04

## 2023-08-10 RX ADMIN — AZITHROMYCIN 250 MG: 250 TABLET, FILM COATED ORAL at 08:42

## 2023-08-10 RX ADMIN — Medication: at 10:30

## 2023-08-10 RX ADMIN — BENZONATATE 100 MG: 100 CAPSULE ORAL at 14:00

## 2023-08-10 RX ADMIN — ROFLUMILAST 500 MCG: 500 TABLET ORAL at 08:41

## 2023-08-10 RX ADMIN — SILVER NITRATE APPLICATORS 1 EACH: 25; 75 STICK TOPICAL at 10:30

## 2023-08-10 RX ADMIN — INSULIN GLARGINE 10 UNITS: 100 INJECTION, SOLUTION SUBCUTANEOUS at 08:36

## 2023-08-10 RX ADMIN — TORSEMIDE 20 MG: 20 TABLET ORAL at 08:42

## 2023-08-10 RX ADMIN — PANTOPRAZOLE SODIUM 40 MG: 40 TABLET, DELAYED RELEASE ORAL at 06:27

## 2023-08-10 RX ADMIN — Medication 10 ML: at 08:50

## 2023-08-10 ASSESSMENT — PAIN SCALES - GENERAL: PAINLEVEL_OUTOF10: 7

## 2023-08-10 ASSESSMENT — PAIN DESCRIPTION - LOCATION: LOCATION: BACK

## 2023-08-10 NOTE — CARE COORDINATION
DISCHARGE ORDER  Date/Time 8/10/2023 3:37 PM  Completed by: Atif Swain, Case Management    Patient Name: Federico Cheney      : 1950  Admitting Diagnosis: Acidosis [E87.20]  Hyponatremia [E87.1]  Elevated troponin [R77.8]  COPD exacerbation ([address]) [J44.1]  Acute kidney injury ([address]) [N17.9]      Admit order Date and Status: 2023 Stable  (verify MD's last order for status of admission)      Noted discharge order. If applicable PT/OT recommendation at Discharge: NA      Confirmed discharge plan: Yes  with whom____Gris___________  If pt confirmed DC plan does family need to be contacted by CM No     Discharge Plan: Plan to DC home and resume HC with [facility]. Faxed info to Mindy at [facility]. Pt given home nebulizer via [facility]. Pt needs [employer], insurance denied. Prior Auth    Date of Last IMM Given: 8/10/2023    Reviewed chart. Role of discharge planner explained and patient verbalized understanding. Discharge order is noted. Has Home O2 in place on admit:  Yes  Informed of need to bring portable home O2 tank on day of discharge for nursing to connect prior to leaving:   Yes  Verbalized agreement/Understanding:   Yes      Pt is being d/c'd to home today. Pt's O2 sats are 93% on RA. Discharge timeout done with PT, CM, Alia Negrete RN, [address], Gabriela Alejandro . All discharge needs and concerns addressed.

## 2023-08-10 NOTE — PLAN OF CARE
50-year-old female presenting with increasing shortness of breath. Patient with history of COPD, bronchiectasis, on chronic Zithromax daily. Exam today shows COPD exacerbation. Given nebulizer treatment steroids in the ED. Patient hypotensive, hyponatremic with sodium of 127, creatinine elevated 1.9. Started on IV fluids, hold diuretics. .    Troponin 33, 29. Evangelina Scott BNP elevated but at baseline.     Diabetes on Lantus and sliding scale
HEART FAILURE CARE PLAN:    Comorbidities Reviewed: Yes   Patient has a past medical history of Acute on chronic diastolic heart failure due to coronary artery disease ([address]), Acute respiratory failure with hypoxia ([address]), Atherosclerosis of native artery of right lower extremity with rest pain ([address]), Back pain, Branch retinal vein occlusion, Bronchiectasis with acute exacerbation ([facility]), Candidal dermatitis, Cellulitis and abscess of left leg, Cellulitis of left lower extremity, CHF (congestive heart failure) ([facility]), Closed compression fracture of thoracic vertebra ([facility]), Closed fracture of facial bone with routine healing, Closed jaw fracture ([address]), Community acquired pneumonia of left lower lobe of lung, Compression fracture of L1 lumbar vertebra ([facility]), COPD (chronic obstructive pulmonary disease) ([address]), COPD exacerbation ([facility]), COVID, Fracture of tibial plateau, closed, left, initial encounter, HCAP (healthcare-associated pneumonia), Minimally displaced zone I fracture of sacrum ([facility]), MRSA (methicillin resistant staph aureus) culture positive, MRSA (methicillin resistant Staphylococcus aureus), Mucus plugging of bronchi, NSTEMI (non-ST elevated myocardial infarction) ([address]), Osteomyelitis of mandible, Osteoporosis with pathological fracture, Pneumonia of left lower lobe due to infectious organism, Post herpetic neuralgia, Pressure ulcer of left heel, stage 3 ([facility]), Proximal humerus fracture, Rheumatoid arthritis ([address]), Shingles, Sleep apnea, Status post incision and drainage, Surgical wound dehiscence, initial encounter (at [facility]), Temporal arteritis ([address]), Tobacco use, Tracheomalacia, and Vitreous hemorrhage, right eye ([address] ECHOCARDIOGRAM Reviewed: Yes   Patient's Ejection Fraction (EF) is greater than 40%    Weights Reviewed:  Yes   Admission weight: 189 lb (85.7 kg)   Wt Readings from Last 3 Encounters:   08/08/23 183 lb 4.8 oz (83.1 kg)   07/28/23 183 lb (83 kg)   07/14/23 181 lb 9.6 oz (82.4
HEART FAILURE CARE PLAN:    Comorbidities Reviewed: Yes   Patient has a past medical history of Acute on chronic diastolic heart failure due to coronary artery disease ([address]), Acute respiratory failure with hypoxia ([address]), Atherosclerosis of native artery of right lower extremity with rest pain ([address]), Back pain, Branch retinal vein occlusion, Bronchiectasis with acute exacerbation ([facility]), Candidal dermatitis, Cellulitis and abscess of left leg, Cellulitis of left lower extremity, CHF (congestive heart failure) ([facility]), Closed compression fracture of thoracic vertebra ([facility]), Closed fracture of facial bone with routine healing, Closed jaw fracture ([address]), Community acquired pneumonia of left lower lobe of lung, Compression fracture of L1 lumbar vertebra ([facility]), COPD (chronic obstructive pulmonary disease) ([address]), COPD exacerbation ([facility]), COVID, Fracture of tibial plateau, closed, left, initial encounter, HCAP (healthcare-associated pneumonia), Minimally displaced zone I fracture of sacrum ([facility]), MRSA (methicillin resistant staph aureus) culture positive, MRSA (methicillin resistant Staphylococcus aureus), Mucus plugging of bronchi, NSTEMI (non-ST elevated myocardial infarction) ([address]), Osteomyelitis of mandible, Osteoporosis with pathological fracture, Pneumonia of left lower lobe due to infectious organism, Post herpetic neuralgia, Pressure ulcer of left heel, stage 3 ([facility]), Proximal humerus fracture, Rheumatoid arthritis ([address]), Shingles, Sleep apnea, Status post incision and drainage, Surgical wound dehiscence, initial encounter (at [facility]), Temporal arteritis ([address]), Tobacco use, Tracheomalacia, and Vitreous hemorrhage, right eye ([address] ECHOCARDIOGRAM Reviewed: Yes   Patient's Ejection Fraction (EF) is greater than 40%    Weights Reviewed:  Yes   Admission weight: 189 lb (85.7 kg)   Wt Readings from Last 3 Encounters:   08/05/23 193 lb 9 oz (87.8 kg)   07/28/23 183 lb (83 kg)   07/14/23 181 lb 9.6 oz (82.4
HEART FAILURE CARE PLAN:    Comorbidities Reviewed: Yes   Patient has a past medical history of Acute on chronic diastolic heart failure due to coronary artery disease ([address]), Acute respiratory failure with hypoxia ([address]), Atherosclerosis of native artery of right lower extremity with rest pain ([address]), Back pain, Branch retinal vein occlusion, Bronchiectasis with acute exacerbation ([facility]), Candidal dermatitis, Cellulitis and abscess of left leg, Cellulitis of left lower extremity, CHF (congestive heart failure) ([facility]), Closed compression fracture of thoracic vertebra ([facility]), Closed fracture of facial bone with routine healing, Closed jaw fracture ([address]), Community acquired pneumonia of left lower lobe of lung, Compression fracture of L1 lumbar vertebra ([facility]), COPD (chronic obstructive pulmonary disease) ([address]), COPD exacerbation ([facility]), COVID, Fracture of tibial plateau, closed, left, initial encounter, HCAP (healthcare-associated pneumonia), Minimally displaced zone I fracture of sacrum ([facility]), MRSA (methicillin resistant staph aureus) culture positive, MRSA (methicillin resistant Staphylococcus aureus), Mucus plugging of bronchi, NSTEMI (non-ST elevated myocardial infarction) ([address]), Osteomyelitis of mandible, Osteoporosis with pathological fracture, Pneumonia of left lower lobe due to infectious organism, Post herpetic neuralgia, Pressure ulcer of left heel, stage 3 ([facility]), Proximal humerus fracture, Rheumatoid arthritis ([address]), Shingles, Sleep apnea, Status post incision and drainage, Surgical wound dehiscence, initial encounter (at [facility]), Temporal arteritis ([address]), Tobacco use, Tracheomalacia, and Vitreous hemorrhage, right eye ([address]). ECHOCARDIOGRAM Reviewed: Yes   Patient's Ejection Fraction (EF) is greater than 40%    Weights Reviewed:  Yes   Admission weight: 189 lb (85.7 kg)   Wt Readings from Last 3 Encounters:   08/07/23 188 lb 6.4 oz (85.5 kg)   07/28/23 183 lb (83 kg)   07/14/23 181 lb 9.6 oz (82.4
HEART FAILURE CARE PLAN:    Comorbidities Reviewed: Yes   Patient has a past medical history of Acute on chronic diastolic heart failure due to coronary artery disease ([address]), Acute respiratory failure with hypoxia ([address]), Atherosclerosis of native artery of right lower extremity with rest pain ([address]), Back pain, Branch retinal vein occlusion, Bronchiectasis with acute exacerbation ([facility]), Candidal dermatitis, Cellulitis and abscess of left leg, Cellulitis of left lower extremity, CHF (congestive heart failure) ([facility]), Closed compression fracture of thoracic vertebra ([facility]), Closed fracture of facial bone with routine healing, Closed jaw fracture ([address]), Community acquired pneumonia of left lower lobe of lung, Compression fracture of L1 lumbar vertebra ([facility]), COPD (chronic obstructive pulmonary disease) ([address]), COPD exacerbation ([facility]), COVID, Fracture of tibial plateau, closed, left, initial encounter, HCAP (healthcare-associated pneumonia), Minimally displaced zone I fracture of sacrum ([facility]), MRSA (methicillin resistant staph aureus) culture positive, MRSA (methicillin resistant Staphylococcus aureus), Mucus plugging of bronchi, NSTEMI (non-ST elevated myocardial infarction) ([address]), Osteomyelitis of mandible, Osteoporosis with pathological fracture, Pneumonia of left lower lobe due to infectious organism, Post herpetic neuralgia, Pressure ulcer of left heel, stage 3 ([facility]), Proximal humerus fracture, Rheumatoid arthritis ([address]), Shingles, Sleep apnea, Status post incision and drainage, Surgical wound dehiscence, initial encounter (at [facility]), Temporal arteritis ([address]), Tobacco use, Tracheomalacia, and Vitreous hemorrhage, right eye ([address] ECHOCARDIOGRAM Reviewed: Yes   Patient's Ejection Fraction (EF) is greater than 40%    Weights Reviewed:  Yes   Admission weight: 189 lb (85.7 kg)   Wt Readings from Last 3 Encounters:   08/06/23 191 lb 4.8 oz (86.8 kg)   07/28/23 183 lb (83 kg)   07/14/23 181 lb 9.6 oz (82.4
Problem: Chronic Conditions and Co-morbidities  Goal: Patient's chronic conditions and co-morbidity symptoms are monitored and maintained or improved  Outcome: Progressing
Problem: Discharge Planning  Goal: Discharge to home or other facility with appropriate resources  8/10/2023 0933 by Rell Mccloud RN  Outcome: Progressing  8/9/2023 2226 by Dayan Calderon RN  Outcome: Progressing     Problem: Safety - Adult  Goal: Free from fall injury  8/10/2023 0933 by Rell Mccloud RN  Outcome: Progressing  8/9/2023 2226 by Dayan Calderon RN  Outcome: Progressing     Problem: Skin/Tissue Integrity  Goal: Absence of new skin breakdown  Description: 1. Monitor for areas of redness and/or skin breakdown  2. Assess vascular access sites hourly  3. Every 4-6 hours minimum:  Change oxygen saturation probe site  4. Every 4-6 hours:  If on nasal continuous positive airway pressure, respiratory therapy assess nares and determine need for appliance change or resting period.   8/10/2023 0933 by Rell Mccloud RN  Outcome: Progressing  8/9/2023 2226 by Dayan Calderon RN  Outcome: Progressing     Problem: Respiratory - Adult  Goal: Achieves optimal ventilation and oxygenation  8/10/2023 0933 by Rell Mccloud RN  Outcome: Progressing  8/9/2023 2226 by Dayan Calderon RN  Outcome: Progressing     Problem: Cardiovascular - Adult  Goal: Maintains optimal cardiac output and hemodynamic stability  8/10/2023 0933 by Rell Mccloud RN  Outcome: Progressing  8/9/2023 2226 by Dayan Calderon RN  Outcome: Progressing  Goal: Absence of cardiac dysrhythmias or at baseline  8/10/2023 0933 by Rell Mccloud RN  Outcome: Progressing  8/9/2023 2226 by Dayan Calderon RN  Outcome: Progressing     Problem: Skin/Tissue Integrity - Adult  Goal: Skin integrity remains intact  8/10/2023 0933 by Rell Mccloud RN  Outcome: Progressing  8/9/2023 2226 by Dayan Calderon RN  Outcome: Progressing     Problem: Chronic Conditions and Co-morbidities  Goal: Patient's chronic conditions and co-morbidity symptoms are monitored and maintained or improved  8/10/2023 0933 by Rell Mccloud
Problem: Discharge Planning  Goal: Discharge to home or other facility with appropriate resources  Outcome: Progressing     Problem: Safety - Adult  Goal: Free from fall injury  Outcome: Progressing     Problem: Skin/Tissue Integrity  Goal: Absence of new skin breakdown  Description: 1. Monitor for areas of redness and/or skin breakdown  2. Assess vascular access sites hourly  3. Every 4-6 hours minimum:  Change oxygen saturation probe site  4. Every 4-6 hours:  If on nasal continuous positive airway pressure, respiratory therapy assess nares and determine need for appliance change or resting period.   Outcome: Progressing     Problem: Respiratory - Adult  Goal: Achieves optimal ventilation and oxygenation  Outcome: Progressing     Problem: Cardiovascular - Adult  Goal: Maintains optimal cardiac output and hemodynamic stability  Outcome: Progressing  Goal: Absence of cardiac dysrhythmias or at baseline  Outcome: Progressing     Problem: Skin/Tissue Integrity - Adult  Goal: Skin integrity remains intact  Outcome: Progressing     Problem: Metabolic/Fluid and Electrolytes - Adult  Goal: Electrolytes maintained within normal limits  Outcome: Progressing     Problem: Chronic Conditions and Co-morbidities  Goal: Patient's chronic conditions and co-morbidity symptoms are monitored and maintained or improved  Outcome: Progressing     Problem: Pain  Goal: Verbalizes/displays adequate comfort level or baseline comfort level  Outcome: Progressing
co-morbidity symptoms are monitored and maintained or improved  8/10/2023 1514 by Luz Quinonez RN  Outcome: Adequate for Discharge  8/10/2023 0933 by Luz Quinonez RN  Outcome: Progressing     Problem: Pain  Goal: Verbalizes/displays adequate comfort level or baseline comfort level  8/10/2023 1514 by Luz Quinonez RN  Outcome: Adequate for Discharge  8/10/2023 0933 by Luz Quinonez RN  Outcome: Progressing
kg)     Intake & Output Reviewed: Yes     Intake/Output Summary (Last 24 hours) at 8/10/2023 1250  Last data filed at 8/10/2023 1042  Gross per 24 hour   Intake 1554 ml   Output 700 ml   Net 854 ml     Medications Reviewed: Yes   SCHEDULED HOSPITAL MEDICATIONS:   predniSONE  20 mg Oral Daily    vancomycin  1,250 mg IntraVENous Q24H    torsemide  20 mg Oral Daily    povidone-iodine   Topical Daily    insulin glargine  10 Units SubCUTAneous BID    insulin lispro  0-8 Units SubCUTAneous TID WC    insulin lispro  0-4 Units SubCUTAneous Nightly    silver nitrate applicators  1 each Topical Daily    azithromycin  250 mg Oral Daily    enoxaparin  40 mg SubCUTAneous Nightly    Roflumilast  500 mcg Oral Daily    sacubitril-valsartan  0.5 tablet Oral BID    spironolactone  12.5 mg Oral Daily    morphine  15 mg Oral BID    metoprolol succinate  12.5 mg Oral Daily    budesonide-formoterol  2 puff Inhalation BID RT    And    tiotropium  2 puff Inhalation Daily RT    nystatin  5 mL Oral 4x Daily    atorvastatin  40 mg Oral Nightly    clopidogrel  75 mg Oral Daily    DULoxetine  60 mg Oral BID    gabapentin  300 mg Oral BID    latanoprost  1 drop Both Eyes Nightly    pantoprazole  40 mg Oral QAM AC    sodium chloride flush  5-40 mL IntraVENous 2 times per day    ipratropium 0.5 mg-albuterol 2.5 mg  1 Dose Inhalation BID     ACE/ARB/ARNI is REQUIRED for EF </= 47% SYSTOLIC FAILURE:   ACE[de-identified] None  ARB[de-identified] None  ARNI[de-identified] Sacubitril/Valsartan-Entresto    Evidenced-Based Beta Blocker is REQUIRED for EF </= 61% SYSTOLIC FAILURE:   [de-identified] Metoprolol SUCCinate- Toprol XL    Diuretics:  [de-identified]    and Torsemide    Diet Reviewed: Yes   ADULT DIET; Regular; 4 carb choices (60 gm/meal);  Low Sodium (2 gm); 2000 ml    Goal of Care Reviewed: Yes   Patient and/or Family's stated Goal of Care this Admission: reduce shortness of breath, increase activity tolerance, better understand heart failure and disease management, be more comfortable, and reduce lower
extremity edema prior to discharge.

## 2023-08-10 NOTE — DISCHARGE SUMMARY
Hospital Medicine Discharge Summary    Patient: Mike Oscar     Gender: female  : 1950   Age: 71 y.o. MRN: [id number]    Admitting Physician: Harrison Henriquez MD  Discharge Physician: Chicho Paulson DO    Code Status: Full Code     Admit Date: 2023   Discharge Date: 8/10/2023      Discharge Diagnoses: Active Hospital Problems    Diagnosis Date Noted    Acute kidney injury ([address]) [N17.9] 2023     Priority: Medium    Pneumonia due to methicillin resistant Staphylococcus aureus (MRSA) ([address]) [J15.212] 2023    Acute on chronic diastolic CHF (congestive heart failure) ([address]) [I50.33] 2023    Acute heart failure with preserved ejection fraction (HFpEF) ([address]) [I50.31] 2023    Elevated troponin [R77.8] 2023    COPD exacerbation ([address]) [J44.1] 2023    Bronchiectasis without complication ([address]) [F81.5] 2018    Hyponatremia [E87.1] 10/19/2017       Condition at Discharge: Stable    Hospital Course:     # Acute on Chronic HFpEF  - Endorsed worsening SOB, SHIN after 10 feet, LE edema, orthopnea, increased weight and abdominal girth over one week, worsened over 2 days. Not using additional Demadex for increase swelling/weight. Complaint with low-salt diet but not daily weights. - Last TTE in 2022 with LVEF of 50-55% improved from 30-35% in 3/2022.  - CXR with pulmonary congestion. Has over 10 lbs weight gain in 1 week per chart review.   - BNP 2,347  - Treated with IV lasix  - Continue Toprol-XL, hold Aldactone and Entresto for now in setting of ISI  - continue daily weights, low sodium diet, 2000 ml fluid restriction, and strict I/O  - cardiology consulted     # COPD with exacerbation  - Not on oxygen at baseline  - on chronic Zithromax, has not been taking since adm - resumed   - continue nebulizer treatments   - given IV Solu-Medrol - continue prednisone  - pulmonology consulted  - taper down steroids    #Suspected MRSA PNA  CT chest obtained patchy opacity post

## 2023-08-11 ENCOUNTER — HOSPITAL ENCOUNTER (OUTPATIENT)
Dept: WOUND CARE | Age: 73
Discharge: HOME | End: 2023-08-11

## 2023-08-11 ENCOUNTER — FOLLOWUP TELEPHONE ENCOUNTER (OUTPATIENT)
Dept: ADMINISTRATIVE | Age: 73
End: 2023-08-11

## 2023-08-11 DIAGNOSIS — I87.2 VENOUS INSUFFICIENCY OF LEFT LOWER EXTREMITY: Primary | ICD-10-CM

## 2023-08-11 DIAGNOSIS — L92.9 HYPERGRANULATION: ICD-10-CM

## 2023-08-11 DIAGNOSIS — L89.154 PRESSURE ULCER OF COCCYGEAL REGION, STAGE 4 (HCC): ICD-10-CM

## 2023-08-11 NOTE — TELEPHONE ENCOUNTER
Heart Failure Follow-Up Call:    Call within 72 Hours of Discharge: Yes     Patient: Devon Clements   Patient : 1950   MRN: [id number]    Date of discharge: 8/10/2023    Discharge department/facility: [facility]    Discharge Disposition: Home with [address]    RARS: Readmission Risk Score: 18.3    Spoke with: Nikki Harrington is doing well since discharge. Stated her [address] [facility] nurse was out to see her this morning. SpO2 was 98% on RA. No oxygen requirements. Denies any new shortness of breath or swelling. Weight this morning was 184 lbs. Patient thinks there is a difference in scales with 4 lb difference. Will compare number off tomorrows weight. Reinforced Heart Failure education on: signs/symptoms to monitor, medications, daily weights, low sodium diet, 2000 ml fluid restriction, and activity. Provided Heart Failure Nurse number at [phone] for any further questions or assistance with resources.      Follow Up  Future Appointments   Date Time Provider [address] [place] [address]   2023  1:00 PM MD Abeba Johnson [facility]   2023 10:00 AM MD Abeba Johnson [facility]   2023 10:00 AM MD Milton Johnson [facility]   2023 10:30 AM LORENZO Diaz CNP [facility] CLER CAR [facility]   2023 10:00 AM MD Abeba Johnson [facility]   10/3/2023 12:00 PM [facility] PULMONARY FUNCTION TESTING [facility] PFT HAYDE [facility]   10/3/2023  1:00 PM Neva Masters MD CLERM PULM [facility]   10/6/2023 10:00 AM MD Abeba Johnson [facility]   10/20/2023 10:00 AM MD Abeba Johnson [facility]   11/3/2023 10:00 AM MD Milton Johnson [facility]   2023 11:00 AM LORENZO Diaz CNP [facility] CLER CAR [facility]       Electronically signed by Zuleyka Carrera MSN, RN  on 2023 at 1:24 PM

## 2023-08-14 ENCOUNTER — TELEPHONE (OUTPATIENT)
Dept: PULMONOLOGY | Age: 73
End: 2023-08-14

## 2023-08-14 NOTE — DISCHARGE INSTRUCTIONS
wound  Triad to damp skin maegan wound  Hydrofera Blue moistened to wound - please be sure to cut and tuck into wound depth (please provide patient with remaining)  Gauze fluffed  Cover with Mepilex Border  Change daily        Please note, all wounds (unless stated otherwise here) were mechanically debrided at the time of cleansing here in the wound-care center today, so a small amount of pain, drainage or bleeding from that process might be expected, and is normal.     All products for home use, including multiple products for a single wound if applicable, are medically necessary in order to achieve the best chance at timely wound healing. See provider documentation for details if needed. Substituted dressings applied in the [facility] today, if applicable:     N/a     New orders for this week (labs, imaging, medications, etc.):     7/28/23 - You may order silver nitrate sticks from [employer]. Additional instructions for specific diagnoses:     7/28/23 - Please plan to purchase a mattress topper - egg crate or memory foam. No plastic covering. Please try to offload as much as possible to aid in healing        +ROHO cushion- use at [address] when sitting!!  +True Balance Air group II mattress      General comments for pressure ulcers: *  Make sure you stay well-hydrated, and maintain good protein intake. *  Reposition at least every two hours, to keep from having pressure in one spot for too long. *  If you are not sure whether you have the best offloading surfaces (mattress, mattress overlay, chair cushion, heel-protector boots, etc), please ask. *  Moisturize your skin regularly with Vaseline, Aquaphor, Aveeno, CeraVe, Cetaphil, Eucerin, Lubriderm, etc; but keep the skin between your toes dry. *  If you smoke, your wound can not heal properly -- please talk with us when you're ready to quit.         F/U Appointment is with Dr. Armando Srivastava in 2 weeks on                                                   at

## 2023-08-14 NOTE — TELEPHONE ENCOUNTER
Inpatient Notes  Received: 4 days ago  Attil[place] Caldwell [place] Yoli Molina MD    Discharge from hospital 8/10/23. F/u needed. Please advise when to belle.

## 2023-08-14 NOTE — TELEPHONE ENCOUNTER
Offer pt to see me in the next 2 weeks for hospital f/u and we will keep scheduled appt with Dr. Jannette Saldivar as is in October

## 2023-08-16 NOTE — TELEPHONE ENCOUNTER
Increase to Prednisone 40 mg, I can send new Rx if she does not have extra Prednisone at home. See me today or Friday.   Sputum cx if she is having productive cough

## 2023-08-16 NOTE — TELEPHONE ENCOUNTER
Pt's daughter called and stated that since being discharged from hospital on 8/10, pt has finished her antibiotics but has not improved. Pt's symptoms seem to be getting worse. Pt's daughter is concerned and wondering if Pt requires another round of antibiotics. Pt is currently scheduled for an office visit w/ [address] [facility] on 8/24 for hosp f/u. Please advise.

## 2023-08-17 ENCOUNTER — OFFICE VISIT (OUTPATIENT)
Dept: PULMONOLOGY | Age: 73
End: 2023-08-17

## 2023-08-17 ENCOUNTER — HOSPITAL ENCOUNTER (OUTPATIENT)
Age: 73
Discharge: HOME | End: 2023-08-17
Payer: MEDICARE

## 2023-08-17 VITALS
HEART RATE: 79 BPM | DIASTOLIC BLOOD PRESSURE: 60 MMHG | BODY MASS INDEX: 26.59 KG/M2 | RESPIRATION RATE: 18 BRPM | HEIGHT: 69 IN | OXYGEN SATURATION: 98 % | SYSTOLIC BLOOD PRESSURE: 125 MMHG

## 2023-08-17 DIAGNOSIS — J44.1 COPD WITH EXACERBATION (HCC): ICD-10-CM

## 2023-08-17 DIAGNOSIS — J44.1 COPD WITH EXACERBATION (HCC): Primary | ICD-10-CM

## 2023-08-17 PROBLEM — R73.9 HYPERGLYCEMIA: Status: RESOLVED | Noted: 2023-02-18 | Resolved: 2023-08-17

## 2023-08-17 PROBLEM — I50.33 ACUTE ON CHRONIC DIASTOLIC CHF (CONGESTIVE HEART FAILURE) (HCC): Status: RESOLVED | Noted: 2023-08-07 | Resolved: 2023-08-17

## 2023-08-17 PROBLEM — E87.1 HYPONATREMIA: Status: RESOLVED | Noted: 2017-10-19 | Resolved: 2023-08-17

## 2023-08-17 PROBLEM — M81.0 OSTEOPOROSIS: Chronic | Status: ACTIVE | Noted: 2018-09-25

## 2023-08-17 PROBLEM — J15.212 PNEUMONIA DUE TO METHICILLIN RESISTANT STAPHYLOCOCCUS AUREUS (MRSA) (HCC): Status: RESOLVED | Noted: 2023-08-09 | Resolved: 2023-08-17

## 2023-08-17 PROBLEM — I50.31 ACUTE HEART FAILURE WITH PRESERVED EJECTION FRACTION (HFPEF) (HCC): Status: RESOLVED | Noted: 2023-08-06 | Resolved: 2023-08-17

## 2023-08-17 PROBLEM — J47.9 BRONCHIECTASIS WITHOUT COMPLICATION (HCC): Chronic | Status: ACTIVE | Noted: 2018-12-19

## 2023-08-17 PROBLEM — N17.9 ACUTE KIDNEY INJURY (HCC): Status: RESOLVED | Noted: 2023-02-18 | Resolved: 2023-08-17

## 2023-08-17 PROBLEM — I87.2 VENOUS INSUFFICIENCY OF LEFT LOWER EXTREMITY: Chronic | Status: ACTIVE | Noted: 2022-04-16

## 2023-08-17 PROBLEM — I25.10 CORONARY ARTERY DISEASE: Chronic | Status: ACTIVE | Noted: 2017-07-03

## 2023-08-17 PROBLEM — J96.11 CHRONIC RESPIRATORY FAILURE WITH HYPOXIA (HCC): Status: RESOLVED | Noted: 2021-12-20 | Resolved: 2023-08-17

## 2023-08-17 PROBLEM — I08.0 MITRAL VALVE INSUFFICIENCY AND AORTIC VALVE INSUFFICIENCY: Chronic | Status: ACTIVE | Noted: 2017-03-02

## 2023-08-17 PROCEDURE — 87070 CULTURE OTHR SPECIMN AEROBIC: CPT

## 2023-08-17 PROCEDURE — 87186 SC STD MICRODIL/AGAR DIL: CPT

## 2023-08-17 PROCEDURE — 87077 CULTURE AEROBIC IDENTIFY: CPT

## 2023-08-17 PROCEDURE — 87205 SMEAR GRAM STAIN: CPT

## 2023-08-17 RX ORDER — BUDESONIDE, GLYCOPYRROLATE, AND FORMOTEROL FUMARATE 160; 9; 4.8 UG/1; UG/1; UG/1
2 AEROSOL, METERED RESPIRATORY (INHALATION) 2 TIMES DAILY
Qty: 1 EACH | Refills: 0 | Status: SHIPPED | COMMUNITY
Start: 2023-08-17 | End: 2024-08-16

## 2023-08-17 RX ORDER — FLUCONAZOLE 100 MG/1
TABLET ORAL
Qty: 8 TABLET | Refills: 1 | Status: SHIPPED | OUTPATIENT
Start: 2023-08-17

## 2023-08-17 RX ORDER — PREDNISONE 10 MG/1
TABLET ORAL
Qty: 50 TABLET | Refills: 0 | Status: SHIPPED | OUTPATIENT
Start: 2023-08-17

## 2023-08-17 NOTE — TELEPHONE ENCOUNTER
Teena WHELAN asking for return call to check status. Returned call and informed Efren Louis of Lina's response with verbal understanding. Efren Louis states that pt will need a new script of prednisone called in to pharmacy. Scheduled pt for a visit today with [facility]-added need for prednisone to appt note. Efren Louis also states that pt cough is non productive. Pt is coughing very hard, but is not getting anything up for a sputum sample to be done. Efren Louis wonders if another abx is needed? Will address during visit.

## 2023-08-17 NOTE — PROGRESS NOTES
PULMONARY, CRITICAL CARE AND SLEEP MEDICINE   Outpatient Pulmonary Progress Note   CC: Cough    Interval History 8/17/23:  [facility] hospital f/u  -  Was admitted 8/5/23 - 8/10/23 for exacerbations of CHF (10 lb weight gain) & COPD and suspected MRSA pneumonia for which she was seen by our partner and treated with 2 days Bactrim followed by 2 days Vancomycin and sent home on 5 days of Zyvox. Sent home with 4 days Prednisone. Pneumonia molecular panel was positive for MRSA & a coronavirus.   -  Worked in for urgent visit for worsening since hospital discharge as she continues with a severe non-productive cough causing soreness, gasping and insomnia. She has sore throat, mouth and tongue presumably from thrush that is not well controlled with nystatin. Steroids were causing hyperglycemia while inpatient. Concerned about pt's immunocompromised state while on Abatacept (Orencia) for RA. Daughter inquiring about bronchoscopy after reading our partner's inpatient note. Trelegy expensive, asking about alternatives. Interval History 5/16/23: Hospitalized in February with pneumonia, sick call end of March, rx ceftin/prednisone. She was better on prednisone but now with more cough, deep, sometimes productive. Still on daliresp. Gets infusion for RA     Previous history: 63 yo with 2 month h/o severe waxing/waning cough, tx'd with avelox w/o improvement & then changed to Zpac, some improvement; then treated with prednisone & Zpac & then clearly better, then returned & retreated with cough medicine & prednisone. No cough prior to 3 months ago except with sinus drainage. Cough has slowly been improving since last visit here. DATA:   Blood pressure 125/60, pulse 79, resp. rate 18, height 5' 9\" (1.753 m), SpO2 98 %. 'on RA  Constitutional:  No acute distress. HENT:  Oropharynx is clear and moist.  One white plaque on mucosal surface otherwise no erythema or edema. Neck: No tracheal deviation present.    Cardiovascular:

## 2023-08-18 ENCOUNTER — HOSPITAL ENCOUNTER (OUTPATIENT)
Dept: WOUND CARE | Age: 73
Discharge: HOME | End: 2023-08-18

## 2023-08-18 DIAGNOSIS — I87.2 VENOUS INSUFFICIENCY OF LEFT LOWER EXTREMITY: Primary | ICD-10-CM

## 2023-08-18 DIAGNOSIS — L89.154 PRESSURE ULCER OF COCCYGEAL REGION, STAGE 4 (HCC): ICD-10-CM

## 2023-08-18 DIAGNOSIS — L92.9 HYPERGRANULATION: ICD-10-CM

## 2023-08-19 LAB
BACTERIA SPEC RESP CULT: ABNORMAL
BACTERIA SPEC RESP CULT: ABNORMAL
GRAM STN SPEC: ABNORMAL
ORGANISM: ABNORMAL

## 2023-08-19 RX ORDER — LEVOFLOXACIN 500 MG/1
500 TABLET, FILM COATED ORAL DAILY
Qty: 7 TABLET | Refills: 0 | Status: SHIPPED | OUTPATIENT
Start: 2023-08-19 | End: 2023-08-26

## 2023-08-21 NOTE — DISCHARGE INSTRUCTIONS
[address] [facility] Physician Orders and Discharge [facility]  [address] [place] [address], [address] [place], [place] [address], [address]  Telephone: [phone]      Fax: [phone]        Your home care company:   [facility]         Your wound-care supplies will be provided by: [id number]  5537 [facility] orders supplies through [email or web address]. Please note, depending on your insurance coverage, you may have out-of-pocket expenses for these supplies. Someone from [facility] may call you to confirm your order and discuss those potential costs before they ship your products -- please anticipate that call. If your out-of-pocket cost is going to be less than $200, and [facility] cannot reach you, they may ship your supplies as soon as the order is processed, and will send you a bill. If your out-of-pocket cost is going to be more than $200, [facility] should not ship your supplies until they speak with you. If you have any questions about your supplies or your potential out-of-pocket costs, or if you need to place an order for a refill of supplies (typically monthly), Kong Garcia is your local representative, and can be reached at [phone]. Information on "[employer]'s return policy will also be enclosed with your supplies -- please read that carefully before opening your items. NAME:  Fay Taveras   YOB: 1950  PRIMARY DIAGNOSIS FOR WOUND CARE CENTER:  Pressure ulcer     Wound cleansing:  Do not scrub or use excessive force. Wash hands with soap and water before and after dressing changes. Prior to applying a clean dressing, cleanse wound with normal saline, wound cleanser, or mild soap and water. Ask your physician or nurse before getting the wound(s) wet in the shower.                 Wound care for home:      Sacral Wound:  Generous amount of Betadine to wound and maegan wound  Triad maegan wound  Opticell Ag into wound - please give patient remainder today   Cover with Mepilex Border  Change daily or as needed for

## 2023-08-25 ENCOUNTER — HOSPITAL ENCOUNTER (OUTPATIENT)
Dept: WOUND CARE | Age: 73
Discharge: HOME | End: 2023-08-25
Payer: MEDICARE

## 2023-08-25 VITALS
SYSTOLIC BLOOD PRESSURE: 132 MMHG | RESPIRATION RATE: 18 BRPM | HEIGHT: 69 IN | BODY MASS INDEX: 27.78 KG/M2 | DIASTOLIC BLOOD PRESSURE: 69 MMHG | WEIGHT: 187.6 LBS | TEMPERATURE: 97.3 F | HEART RATE: 91 BPM

## 2023-08-25 DIAGNOSIS — L89.154 PRESSURE ULCER OF COCCYGEAL REGION, STAGE 4 (HCC): ICD-10-CM

## 2023-08-25 DIAGNOSIS — I87.2 VENOUS INSUFFICIENCY OF LEFT LOWER EXTREMITY: Primary | Chronic | ICD-10-CM

## 2023-08-25 DIAGNOSIS — L92.9 HYPERGRANULATION: ICD-10-CM

## 2023-08-25 PROCEDURE — 99213 OFFICE O/P EST LOW 20 MIN: CPT

## 2023-08-25 PROCEDURE — 87070 CULTURE OTHR SPECIMN AEROBIC: CPT

## 2023-08-25 PROCEDURE — 87205 SMEAR GRAM STAIN: CPT

## 2023-08-25 RX ORDER — LIDOCAINE 40 MG/G
CREAM TOPICAL ONCE
Status: DISCONTINUED | OUTPATIENT
Start: 2023-08-25 | End: 2023-08-26 | Stop reason: HOSPADM

## 2023-08-25 RX ORDER — LIDOCAINE 50 MG/G
OINTMENT TOPICAL ONCE
OUTPATIENT
Start: 2023-08-25 | End: 2023-08-25

## 2023-08-25 RX ORDER — BACITRACIN ZINC AND POLYMYXIN B SULFATE 500; 1000 [USP'U]/G; [USP'U]/G
OINTMENT TOPICAL ONCE
OUTPATIENT
Start: 2023-08-25 | End: 2023-08-25

## 2023-08-25 RX ORDER — LIDOCAINE 40 MG/G
CREAM TOPICAL ONCE
OUTPATIENT
Start: 2023-08-25 | End: 2023-08-25

## 2023-08-25 RX ORDER — LIDOCAINE HYDROCHLORIDE 40 MG/ML
SOLUTION TOPICAL ONCE
OUTPATIENT
Start: 2023-08-25 | End: 2023-08-25

## 2023-08-25 ASSESSMENT — PAIN DESCRIPTION - LOCATION: LOCATION: BUTTOCKS

## 2023-08-25 ASSESSMENT — PAIN DESCRIPTION - ONSET: ONSET: ON-GOING

## 2023-08-25 ASSESSMENT — PAIN - FUNCTIONAL ASSESSMENT: PAIN_FUNCTIONAL_ASSESSMENT: ACTIVITIES ARE NOT PREVENTED

## 2023-08-25 ASSESSMENT — PAIN DESCRIPTION - ORIENTATION: ORIENTATION: MID

## 2023-08-25 ASSESSMENT — PAIN SCALES - GENERAL: PAINLEVEL_OUTOF10: 6

## 2023-08-25 ASSESSMENT — PAIN DESCRIPTION - PAIN TYPE: TYPE: CHRONIC PAIN

## 2023-08-25 ASSESSMENT — PAIN DESCRIPTION - DESCRIPTORS: DESCRIPTORS: ACHING

## 2023-08-25 ASSESSMENT — PAIN DESCRIPTION - FREQUENCY: FREQUENCY: CONTINUOUS

## 2023-08-25 NOTE — PLAN OF CARE
Patient seen in [facility] today for follow up. Patient reports feeling well overall. Increased drainage noted in wound this week, brown in color, per patient and patient daughter. Wound culture obtained in [facility] today. Patient recently hospitalized from 8/5/23-8/10/23 for heart failure, COPD exacerbation, PNA, UTI and ISI. She was discharged on Zyvox. She states she is feeling better. Her blood sugars have been elevated r/t Prednisone use. Dr. Kj Gupta discussed future plans today with patient. Surgical option vs palliative wound care options given. Patient and patient daughter will considered these options and we will discuss again at next visit. F/u in [facility] in 2 weeks as ordered, pt. Aware to call sooner with any changes or questions/concerns. Discharge instructions reviewed with patient, all questions answered, copy given to patient. Dressings were applied to all wounds per M.D. Instructions at this visit.
smoke, your wound can not heal properly -- please talk with us when you're ready to quit. F/U Appointment is with Dr. Jaya Ceron in 2 weeks on                                                   at                                                       .     Your nurse  is Danielle Duncan RN     If we applied slip-resistant hospital socks today, be sure to remove them at least once a day to inspect your toes or feet, even if you're not changing the wraps or dressings underneath. If you see anything concerning (redness, excess moisture, etc), please call and let us know right away.      Should you experience any significant changes in your wound(s) (including redness, increased warmth, increased pain, increased drainage, odor, or fever) or have questions about your wound care, please contact the [facility] [place] at [phone] Monday-Thursday from 8:00 am - 4:30 pm, or Friday from 8:00 am - 2:30 pm.  If you need help with your wound outside these hours and cannot wait until we are again available, contact your home-care company (if applicable), your PCP, or go to the nearest emergency room

## 2023-08-27 LAB
BACTERIA SPEC AEROBE CULT: NORMAL
GRAM STN SPEC: NORMAL

## 2023-08-28 LAB
BACTERIA SPEC AEROBE CULT: NORMAL
GRAM STN SPEC: NORMAL

## 2023-09-02 PROBLEM — L08.9 INFECTION OF SKIN AND SUBCUTANEOUS TISSUE: Status: ACTIVE | Noted: 2023-09-02

## 2023-09-05 PROBLEM — R77.8 ELEVATED TROPONIN: Status: RESOLVED | Noted: 2023-08-06 | Resolved: 2023-09-05

## 2023-09-05 PROBLEM — R79.89 ELEVATED TROPONIN: Status: RESOLVED | Noted: 2023-08-06 | Resolved: 2023-09-05

## 2023-09-07 NOTE — DISCHARGE INSTRUCTIONS
[address] [facility] Physician Orders and Discharge [facility]  [address] [place] [address], [address] [place] [address]  [place], [address]  Telephone: [phone]      Fax: [phone]        Your home care company:   [facility]         Your wound-care supplies will be provided by: [id number] [facility] orders supplies through [employer]. Please note, depending on your insurance coverage, you may have out-of-pocket expenses for these supplies. Someone from [facility] may call you to confirm your order and discuss those potential costs before they ship your products -- please anticipate that call. If your out-of-pocket cost is going to be less than $200, and [facility] cannot reach you, they may ship your supplies as soon as the order is processed, and will send you a bill. If your out-of-pocket cost is going to be more than $200, [facility] should not ship your supplies until they speak with you. If you have any questions about your supplies or your potential out-of-pocket costs, or if you need to place an order for a refill of supplies (typically monthly), Soren Lomax is your local representative, and can be reached at [phone]. Information on [employer]'s return policy will also be enclosed with your supplies -- please read that carefully before opening your items. NAME:  Cheng Taveras   YOB: 1950  PRIMARY DIAGNOSIS FOR WOUND CARE CENTER:  Pressure ulcer     Wound cleansing:  Do not scrub or use excessive force. Wash hands with soap and water before and after dressing changes. Prior to applying a clean dressing, cleanse wound with normal saline, wound cleanser, or mild soap and water. Ask your physician or nurse before getting the wound(s) wet in the shower.                 Wound care for home:      Sacral Wound:  Generous amount of Betadine maegan wound  Triad maegan wound  Opticell Ag into wound - please give patient remainder today   Cover with Mepilex Border  Change daily or as needed for drainage

## 2023-09-08 ENCOUNTER — HOSPITAL ENCOUNTER (OUTPATIENT)
Dept: WOUND CARE | Age: 73
Discharge: HOME | End: 2023-09-08
Payer: MEDICARE

## 2023-09-08 VITALS
BODY MASS INDEX: 28.29 KG/M2 | RESPIRATION RATE: 18 BRPM | HEART RATE: 86 BPM | DIASTOLIC BLOOD PRESSURE: 55 MMHG | SYSTOLIC BLOOD PRESSURE: 104 MMHG | WEIGHT: 191 LBS | TEMPERATURE: 98.5 F | HEIGHT: 69 IN

## 2023-09-08 DIAGNOSIS — L92.9 HYPERGRANULATION: ICD-10-CM

## 2023-09-08 DIAGNOSIS — L89.154 PRESSURE ULCER OF COCCYGEAL REGION, STAGE 4 (HCC): ICD-10-CM

## 2023-09-08 DIAGNOSIS — I87.2 VENOUS INSUFFICIENCY OF LEFT LOWER EXTREMITY: Primary | Chronic | ICD-10-CM

## 2023-09-08 PROCEDURE — 99212 OFFICE O/P EST SF 10 MIN: CPT

## 2023-09-08 RX ORDER — LIDOCAINE 40 MG/G
CREAM TOPICAL ONCE
Status: CANCELLED | OUTPATIENT
Start: 2023-09-08 | End: 2023-09-08

## 2023-09-08 RX ORDER — LIDOCAINE 40 MG/G
CREAM TOPICAL ONCE
Status: DISCONTINUED | OUTPATIENT
Start: 2023-09-08 | End: 2023-09-08

## 2023-09-08 RX ORDER — BACITRACIN ZINC AND POLYMYXIN B SULFATE 500; 1000 [USP'U]/G; [USP'U]/G
OINTMENT TOPICAL ONCE
Status: CANCELLED | OUTPATIENT
Start: 2023-09-08 | End: 2023-09-08

## 2023-09-08 RX ORDER — LIDOCAINE HYDROCHLORIDE 40 MG/ML
SOLUTION TOPICAL ONCE
Status: CANCELLED | OUTPATIENT
Start: 2023-09-08 | End: 2023-09-08

## 2023-09-08 RX ORDER — LIDOCAINE 50 MG/G
OINTMENT TOPICAL ONCE
Status: CANCELLED | OUTPATIENT
Start: 2023-09-08 | End: 2023-09-08

## 2023-09-08 NOTE — PLAN OF CARE
Patient seen in [facility] today for follow up. Patient reports feeling well overall. She continues with her cough. States \"it's just annoying\". Patient completed her course of Augmentin without issues. She states she started to have some yeast infection symptoms. She took Diflucan and the symptoms subsided. She reports a thick, mucous like drainage from her wound. Wound larger this week. Wound bed does appear healthy. There is circumferential undermining - 2.4 cm. Options discussed again today. Surgery vs palliative care. Surgery wound require 100% offloading and a wound vac. Patient has decided she would rather move forward with palliative care. Patient educated on what bandages to put on wound and to call [facility] if anything changes or if she becomes concerned about wound. Patient voiced understanding and appreciation. Patient to follow up in [facility] as needed. Discharge instructions reviewed with patient, all questions answered, copy given to patient. Dressings were applied to all wounds per M.D. Instructions at this visit.
please talk with us when you're ready to quit. F/U Appointment is with Dr. Nicky Antonio as needed. Please call us any time you need us!!! Your nurse  is Rabia Sinclair RN     If we applied slip-resistant hospital socks today, be sure to remove them at least once a day to inspect your toes or feet, even if you're not changing the wraps or dressings underneath. If you see anything concerning (redness, excess moisture, etc), please call and let us know right away. Should you experience any significant changes in your wound(s) (including redness, increased warmth, increased pain, increased drainage, odor, or fever) or have questions about your wound care, please contact the [facility] at [phone] Monday-Thursday from 8:00 am - 4:30 pm, or Friday from 8:00 am - 2:30 pm.  If you need help with your wound outside these hours and cannot wait until we are again available, contact your home-care company (if applicable), your PCP, or go to the nearest emergency room.

## 2023-09-11 ENCOUNTER — OFFICE VISIT (OUTPATIENT)
Dept: CARDIOLOGY CLINIC | Age: 73
End: 2023-09-11
Payer: MEDICARE

## 2023-09-11 VITALS
HEART RATE: 74 BPM | HEIGHT: 69 IN | SYSTOLIC BLOOD PRESSURE: 108 MMHG | BODY MASS INDEX: 28.32 KG/M2 | WEIGHT: 191.2 LBS | DIASTOLIC BLOOD PRESSURE: 48 MMHG

## 2023-09-11 DIAGNOSIS — I10 ESSENTIAL HYPERTENSION: ICD-10-CM

## 2023-09-11 DIAGNOSIS — I25.10 CORONARY ARTERY DISEASE INVOLVING NATIVE CORONARY ARTERY OF NATIVE HEART WITHOUT ANGINA PECTORIS: ICD-10-CM

## 2023-09-11 DIAGNOSIS — I50.22 CHRONIC SYSTOLIC CONGESTIVE HEART FAILURE (HCC): Primary | ICD-10-CM

## 2023-09-11 PROCEDURE — 3078F DIAST BP <80 MM HG: CPT | Performed by: NURSE PRACTITIONER

## 2023-09-11 PROCEDURE — 1123F ACP DISCUSS/DSCN MKR DOCD: CPT | Performed by: NURSE PRACTITIONER

## 2023-09-11 PROCEDURE — 3074F SYST BP LT 130 MM HG: CPT | Performed by: NURSE PRACTITIONER

## 2023-09-11 PROCEDURE — 99214 OFFICE O/P EST MOD 30 MIN: CPT | Performed by: NURSE PRACTITIONER

## 2023-09-11 RX ORDER — POTASSIUM CHLORIDE 20 MEQ/1
20 TABLET, EXTENDED RELEASE ORAL PRN
Qty: 10 TABLET | Refills: 0 | Status: SHIPPED | OUTPATIENT
Start: 2023-09-11

## 2023-09-11 RX ORDER — SPIRONOLACTONE 25 MG/1
12.5 TABLET ORAL DAILY
Qty: 90 TABLET | Refills: 3 | Status: SHIPPED | OUTPATIENT
Start: 2023-09-11

## 2023-09-11 RX ORDER — METOLAZONE 2.5 MG/1
2.5 TABLET ORAL PRN
Qty: 10 TABLET | Refills: 0 | Status: SHIPPED | OUTPATIENT
Start: 2023-09-11

## 2023-09-11 RX ORDER — TORSEMIDE 20 MG/1
40 TABLET ORAL DAILY
Qty: 60 TABLET | Refills: 3 | Status: SHIPPED | OUTPATIENT
Start: 2023-09-11

## 2023-09-11 ASSESSMENT — ENCOUNTER SYMPTOMS
SHORTNESS OF BREATH: 1
COUGH: 1

## 2023-09-11 NOTE — PATIENT INSTRUCTIONS
Take 1 metolazone 2.5 mg pill with 1 potassium pill  Do not take toprol, entresto or torsemide that day  The following day, increase torsemide to 40 mg daily and resume other medications  Check blood work this week  Follow up in 3-4 weeks

## 2023-09-11 NOTE — PROGRESS NOTES
Intervention:         None   Post Cath Dx:       Severe native disease as above  Occluded S-OM  Suspect severe MR related to papillary dysfunction related to occluded distal RCA and Cx  Consider MVR when wound issues resolve if MR does not improved with medical therapy  Would followup with Dr. Carleen Shaffer    CABG 5/3/2021:  Urgent CABG X 3, with pedicled LIMA to LAD, single Greater Saphenous VG to PV br of RCA, separate single Greater SVG to OM2,  LAAppendage obliteration with 40 mm Atricure LA Clip, CPB, EVH Left Greater Saphenous vein, ABHISHEK, Epiaortic ultrasound, Doppler verification of grafts, Bilateral 5 level intercostal nerve block(Exparel), Platelet gel application. Sternal plating. Echo 4/24/2021:  Normal left ventricle systolic function with an estimated ejection fraction   of 55%. No regional wall motion abnormalities are seen. Normal left ventricular diastolic filling pressure. Mild eccentric aortic regurgitation. Mild mitral and tricuspid regurgitation. Systolic pulmonary artery pressure (SPAP) is normal and estimated at 27 mmHg   (right atrial pressure 3 mmHg). Cardiology Labs Reviewed:   CBC:   No results for input(s): \"WBC\", \"HGB\", \"HCT\", \"PLT\" in the last 72 hours. BMP:  No results for input(s): \"NA\", \"K\", \"CO2\", \"BUN\", \"CREATININE\", \"LABGLOM\", \"GLUCOSE\" in the last 72 hours. PT/INR: No results for input(s): \"PROTIME\", \"INR\" in the last 72 hours. APTT:No results for input(s): \"APTT\" in the last 72 hours. FASTING LIPID PANEL:  Lab Results   Component Value Date/Time    HDL 34 10/29/2021 08:23 AM    HDL 43 07/22/2010 09:28 AM    LDLCALC 61 10/29/2021 08:23 AM    TRIG 85 10/29/2021 08:23 AM     LIVER PROFILE:  No results for input(s): \"AST\", \"ALT\", \"ALB\" in the last 72 hours.     BNP:   Lab Results   Component Value Date/Time    PROBNP 2,347 08/05/2023 02:22 PM    PROBNP 2,431 02/17/2023 03:41 PM    PROBNP 3,742 08/08/2022 07:58 PM    PROBNP 2,255 06/06/2022 06:45 PM    PROBNP 8,493

## 2023-09-14 ENCOUNTER — TELEPHONE (OUTPATIENT)
Dept: CARDIOLOGY CLINIC | Age: 73
End: 2023-09-14

## 2023-09-14 PROBLEM — R23.9 MACERATION OF PERIWOUND SKIN: Status: RESOLVED | Noted: 2023-06-12 | Resolved: 2023-09-14

## 2023-09-14 PROBLEM — L08.9 INFECTION OF SKIN AND SUBCUTANEOUS TISSUE: Status: RESOLVED | Noted: 2023-09-02 | Resolved: 2023-09-14

## 2023-09-14 PROBLEM — L92.9 HYPERGRANULATION: Status: RESOLVED | Noted: 2021-08-28 | Resolved: 2023-09-14

## 2023-09-14 NOTE — TELEPHONE ENCOUNTER
Has she taken the metolazone at all? She was to take for 1 dose and then increase torsemide to 40 mg daily the following day.  And please obtain actual weights

## 2023-09-14 NOTE — TELEPHONE ENCOUNTER
Jose A Odell thank you. Have her check BMP today or tomorrow like we discussed in office then will determine if ok to take another dose of metolazone. Happy to hear she is already down 5 lbs.

## 2023-09-14 NOTE — TELEPHONE ENCOUNTER
9/14 Pt's daughter called and stated that pt was just seen in office on 9/12. NPLR just prescribed the pt metOLazone (ZAROXOLYN) 2.5 MG tablet and potassium chloride (KLOR-CON M) 20 MEQ extended release tablet      Pt was having swelling in her legs. Pt's daughter stated that swelling is going down but still has some swelling. Pt's weight has dropped 5lbs since 9/12. Pt's daughter would like to know if she should take the metolazone and potassium today since the swelling is decreasing or just wait? Please advise.

## 2023-09-14 NOTE — TELEPHONE ENCOUNTER
Called and spoke to pt, she stated that she did start taking Metolazone on 9/12/23, but has not taken anymore doses of Metolazone. Pt has increased her Torsemide as directed. Pt is taking Potassium as directed as well. Pt states she weighed 186 lb this morning and pt weighed 191 lb at OV on Monday. Pt states swelling is increasing through out the day. She is wearing compression stockings and keeping feet elevated.

## 2023-09-15 NOTE — TELEPHONE ENCOUNTER
Spoke to pt, relayed NPLR message. Pt v/u and stated she will have blood work completed while she is out for her infusion. Pt stated she may have the lab work done at [facility] where she is having her infusion done today, or she may stop at [facility] to have them completed. Gave pt our fax number incase she has the labs done at [facility]. Repeat BMP is already ordered in chart and pt stated she has paper lab order. Pt stated she is still swollen. She is still wearing her compression socks. Pt had not weighed herself yet this morning. ARCHIE BAH.

## 2023-09-18 ENCOUNTER — TELEPHONE (OUTPATIENT)
Dept: CARDIOLOGY CLINIC | Age: 73
End: 2023-09-18

## 2023-09-18 LAB
ALBUMIN SERPL-MCNC: 3.8 G/DL
ALP BLD-CCNC: 74 U/L
ALT SERPL-CCNC: 10 U/L
ANION GAP SERPL CALCULATED.3IONS-SCNC: 10 MMOL/L
AST SERPL-CCNC: 13 U/L
BILIRUB SERPL-MCNC: 0.6 MG/DL (ref 0.1–1.4)
BUN BLDV-MCNC: 35 MG/DL
CALCIUM SERPL-MCNC: 9.6 MG/DL
CHLORIDE BLD-SCNC: 92 MMOL/L
CO2: 32 MMOL/L
CREAT SERPL-MCNC: 1.6 MG/DL
EGFR: 34
GLUCOSE BLD-MCNC: 349 MG/DL
POTASSIUM SERPL-SCNC: 3.7 MMOL/L
SODIUM BLD-SCNC: 134 MMOL/L
TOTAL PROTEIN: 6.9

## 2023-09-18 NOTE — TELEPHONE ENCOUNTER
Labs received from [address] [place] [address]. Scanned and abstracted into chart. Labs ready for NPLR review.

## 2023-09-19 ENCOUNTER — TELEPHONE (OUTPATIENT)
Dept: CARDIOLOGY CLINIC | Age: 73
End: 2023-09-19

## 2023-09-19 NOTE — TELEPHONE ENCOUNTER
Thank you! Weight still decreasing and nearing what she was when she left the hospital. Continue torsemide 40 mg daily. Hold off on additional metolazone. Follow up as planned.

## 2023-09-19 NOTE — TELEPHONE ENCOUNTER
----- Message from LORENZO Weiner CNP sent at 9/18/2023 12:42 PM EDT -----  Please let her know that kidney function is slightly worse than before. Please get an update on weights, swelling and shortness of breath since we have increased torsemide to 40 mg daily.  Hold off on additional metolazone for now

## 2023-09-19 NOTE — TELEPHONE ENCOUNTER
Created telephone encounter. Spoke with Mell Lockhart relayed message per [facility] regarding labs. Weights  9/19  185 lb  9/18  187 lb  9/17  188 lb     Pt states she her feet look better. Swelling is more so in left leg. On Friday she went to get infusion at 10 am weight was 188 lb there. If she walks far she has SOB, but she says she still has the cough. No chest pain.

## 2023-10-03 ENCOUNTER — HOSPITAL ENCOUNTER (OUTPATIENT)
Dept: PULMONOLOGY | Age: 73
Discharge: HOME | End: 2023-10-03
Payer: MEDICARE

## 2023-10-03 ENCOUNTER — OFFICE VISIT (OUTPATIENT)
Dept: PULMONOLOGY | Age: 73
End: 2023-10-03
Payer: MEDICARE

## 2023-10-03 ENCOUNTER — OFFICE VISIT (OUTPATIENT)
Dept: CARDIOLOGY CLINIC | Age: 73
End: 2023-10-03
Payer: MEDICARE

## 2023-10-03 ENCOUNTER — PATIENT MESSAGE (OUTPATIENT)
Dept: PULMONOLOGY | Age: 73
End: 2023-10-03

## 2023-10-03 VITALS
DIASTOLIC BLOOD PRESSURE: 48 MMHG | HEIGHT: 69 IN | SYSTOLIC BLOOD PRESSURE: 110 MMHG | HEART RATE: 86 BPM | OXYGEN SATURATION: 97 % | BODY MASS INDEX: 26.31 KG/M2 | WEIGHT: 177.6 LBS

## 2023-10-03 VITALS
DIASTOLIC BLOOD PRESSURE: 60 MMHG | RESPIRATION RATE: 16 BRPM | SYSTOLIC BLOOD PRESSURE: 112 MMHG | HEIGHT: 69 IN | HEART RATE: 90 BPM | BODY MASS INDEX: 26.51 KG/M2 | WEIGHT: 179 LBS | OXYGEN SATURATION: 95 %

## 2023-10-03 VITALS — OXYGEN SATURATION: 96 %

## 2023-10-03 DIAGNOSIS — I10 ESSENTIAL HYPERTENSION: ICD-10-CM

## 2023-10-03 DIAGNOSIS — I25.10 CORONARY ARTERY DISEASE INVOLVING NATIVE CORONARY ARTERY OF NATIVE HEART WITHOUT ANGINA PECTORIS: ICD-10-CM

## 2023-10-03 DIAGNOSIS — I50.22 CHRONIC SYSTOLIC CONGESTIVE HEART FAILURE (HCC): Primary | ICD-10-CM

## 2023-10-03 DIAGNOSIS — J44.9 CHRONIC OBSTRUCTIVE PULMONARY DISEASE, UNSPECIFIED COPD TYPE (HCC): ICD-10-CM

## 2023-10-03 DIAGNOSIS — J44.1 COPD EXACERBATION (HCC): ICD-10-CM

## 2023-10-03 DIAGNOSIS — R05.3 CHRONIC COUGH: Primary | ICD-10-CM

## 2023-10-03 LAB
DLCO %PRED: 51 %
DLCO PRED: NORMAL
DLCO/VA %PRED: NORMAL
DLCO/VA PRED: NORMAL
DLCO/VA: NORMAL
DLCO: NORMAL
EXPIRATORY TIME-POST: NORMAL
EXPIRATORY TIME: NORMAL
FEF 25-75% %CHNG: NORMAL
FEF 25-75% %PRED-POST: NORMAL
FEF 25-75% %PRED-PRE: NORMAL
FEF 25-75% PRED: NORMAL
FEF 25-75%-POST: NORMAL
FEF 25-75%-PRE: NORMAL
FEV1 %PRED-POST: 79 %
FEV1 %PRED-PRE: 75 %
FEV1 PRED: NORMAL
FEV1-POST: NORMAL
FEV1-PRE: NORMAL
FEV1/FVC %PRED-POST: NORMAL
FEV1/FVC %PRED-PRE: NORMAL
FEV1/FVC PRED: NORMAL
FEV1/FVC-POST: 66 %
FEV1/FVC-PRE: 66 %
FVC %PRED-POST: NORMAL
FVC %PRED-PRE: NORMAL
FVC PRED: NORMAL
FVC-POST: NORMAL
FVC-PRE: NORMAL
GAW %PRED: NORMAL
GAW PRED: NORMAL
GAW: NORMAL
IC %PRED: NORMAL
IC PRED: NORMAL
IC: NORMAL
MEP: NORMAL
MIP: NORMAL
MVV %PRED-PRE: NORMAL
MVV PRED: NORMAL
MVV-PRE: NORMAL
PEF %PRED-POST: NORMAL
PEF %PRED-PRE: NORMAL
PEF PRED: NORMAL
PEF%CHNG: NORMAL
PEF-POST: NORMAL
PEF-PRE: NORMAL
RAW %PRED: NORMAL
RAW PRED: NORMAL
RAW: NORMAL
RV %PRED: NORMAL
RV PRED: NORMAL
RV: NORMAL
SVC %PRED: NORMAL
SVC PRED: NORMAL
SVC: NORMAL
TLC %PRED: 83 %
TLC PRED: NORMAL
TLC: NORMAL
VA %PRED: NORMAL
VA PRED: NORMAL
VA: NORMAL
VTG %PRED: NORMAL
VTG PRED: NORMAL
VTG: NORMAL

## 2023-10-03 PROCEDURE — 6370000000 HC RX 637 (ALT 250 FOR IP): Performed by: INTERNAL MEDICINE

## 2023-10-03 PROCEDURE — 94729 DIFFUSING CAPACITY: CPT

## 2023-10-03 PROCEDURE — 3074F SYST BP LT 130 MM HG: CPT | Performed by: INTERNAL MEDICINE

## 2023-10-03 PROCEDURE — 99214 OFFICE O/P EST MOD 30 MIN: CPT | Performed by: NURSE PRACTITIONER

## 2023-10-03 PROCEDURE — 1123F ACP DISCUSS/DSCN MKR DOCD: CPT | Performed by: INTERNAL MEDICINE

## 2023-10-03 PROCEDURE — 99215 OFFICE O/P EST HI 40 MIN: CPT | Performed by: INTERNAL MEDICINE

## 2023-10-03 PROCEDURE — 3074F SYST BP LT 130 MM HG: CPT | Performed by: NURSE PRACTITIONER

## 2023-10-03 PROCEDURE — 1123F ACP DISCUSS/DSCN MKR DOCD: CPT | Performed by: NURSE PRACTITIONER

## 2023-10-03 PROCEDURE — 94640 AIRWAY INHALATION TREATMENT: CPT

## 2023-10-03 PROCEDURE — 3078F DIAST BP <80 MM HG: CPT | Performed by: INTERNAL MEDICINE

## 2023-10-03 PROCEDURE — 94760 N-INVAS EAR/PLS OXIMETRY 1: CPT

## 2023-10-03 PROCEDURE — 3078F DIAST BP <80 MM HG: CPT | Performed by: NURSE PRACTITIONER

## 2023-10-03 PROCEDURE — 94060 EVALUATION OF WHEEZING: CPT

## 2023-10-03 PROCEDURE — 94726 PLETHYSMOGRAPHY LUNG VOLUMES: CPT

## 2023-10-03 RX ORDER — ALBUTEROL SULFATE 90 UG/1
4 AEROSOL, METERED RESPIRATORY (INHALATION) ONCE
Status: COMPLETED | OUTPATIENT
Start: 2023-10-03 | End: 2023-10-03

## 2023-10-03 RX ORDER — FLUTICASONE FUROATE, UMECLIDINIUM BROMIDE AND VILANTEROL TRIFENATATE 200; 62.5; 25 UG/1; UG/1; UG/1
1 POWDER RESPIRATORY (INHALATION) DAILY
Qty: 3 EACH | Refills: 3 | Status: SHIPPED | OUTPATIENT
Start: 2023-10-03

## 2023-10-03 RX ORDER — ALBUTEROL SULFATE 90 UG/1
2 AEROSOL, METERED RESPIRATORY (INHALATION) EVERY 4 HOURS PRN
Qty: 18 G | Refills: 11 | Status: SHIPPED | OUTPATIENT
Start: 2023-10-03

## 2023-10-03 RX ORDER — AZITHROMYCIN 250 MG/1
250 TABLET, FILM COATED ORAL DAILY
COMMUNITY
Start: 2023-09-25

## 2023-10-03 RX ORDER — FLUCONAZOLE 100 MG/1
TABLET ORAL
Qty: 8 TABLET | Refills: 0 | Status: SHIPPED | OUTPATIENT
Start: 2023-10-03

## 2023-10-03 RX ADMIN — Medication 4 PUFF: at 12:19

## 2023-10-03 ASSESSMENT — PULMONARY FUNCTION TESTS
FEV1/FVC_POST: 66
FEV1_PERCENT_PREDICTED_PRE: 75
FEV1_PERCENT_PREDICTED_POST: 79
FEV1/FVC_PRE: 66

## 2023-10-03 ASSESSMENT — ENCOUNTER SYMPTOMS
SHORTNESS OF BREATH: 1
COUGH: 1

## 2023-10-03 NOTE — PROGRESS NOTES
PULMONARY, CRITICAL CARE AND SLEEP MEDICINE   Outpatient Pulmonary Progress Note   CC: Cough    Interval History 10/3/23  - still severe cough, much worse at night, rarely productive. Interval History 8/17/23:  [facility] hospital f/u  -  Was admitted 8/5/23 - 8/10/23 for exacerbations of CHF (10 lb weight gain) & COPD and suspected MRSA pneumonia for which she was seen by our partner and treated with 2 days Bactrim followed by 2 days Vancomycin and sent home on 5 days of Zyvox. Sent home with 4 days Prednisone. Pneumonia molecular panel was positive for MRSA & a coronavirus.   -  Worked in for urgent visit for worsening since hospital discharge as she continues with a severe non-productive cough causing soreness, gasping and insomnia. She has sore throat, mouth and tongue presumably from thrush that is not well controlled with nystatin. Steroids were causing hyperglycemia while inpatient. Concerned about pt's immunocompromised state while on Abatacept (Orencia) for RA. Daughter inquiring about bronchoscopy after reading our partner's inpatient note. Trelegy expensive, asking about alternatives. Interval History 5/16/23: Hospitalized in February with pneumonia, sick call end of March, rx ceftin/prednisone. She was better on prednisone but now with more cough, deep, sometimes productive. Still on daliresp. Gets infusion for RA     Previous history: 61 yo with 2 month h/o severe waxing/waning cough, tx'd with avelox w/o improvement & then changed to Zpac, some improvement; then treated with prednisone & Zpac & then clearly better, then returned & retreated with cough medicine & prednisone. No cough prior to 3 months ago except with sinus drainage. Cough has slowly been improving since last visit here. DATA:   Blood pressure 112/60, pulse 90, resp. rate 16, height 5' 9\" (1.753 m), weight 179 lb (81.2 kg), SpO2 95 %. 'on RA  Constitutional:  No acute distress.    HENT:  Oropharynx is clear and moist.  One

## 2023-10-03 NOTE — PATIENT INSTRUCTIONS
Check blood work to make sure kidney function stable  Continue current medications  Follow up as planned in December

## 2023-10-03 NOTE — PROGRESS NOTES
Results   Component Value Date/Time    HDL 34 10/29/2021 08:23 AM    HDL 43 07/22/2010 09:28 AM    LDLCALC 61 10/29/2021 08:23 AM    TRIG 85 10/29/2021 08:23 AM     LIVER PROFILE:  No results for input(s): \"AST\", \"ALT\", \"ALB\" in the last 72 hours. BNP:   Lab Results   Component Value Date/Time    PROBNP 2,347 08/05/2023 02:22 PM    PROBNP 2,431 02/17/2023 03:41 PM    PROBNP 3,742 08/08/2022 07:58 PM    PROBNP 2,255 06/06/2022 06:45 PM    PROBNP 3,878 04/27/2022 12:30 PM     Reviewed all labs and imaging today    Assessment:   HFrEF: Appears compensated  Cardiomyopathy, likely mixed ischemic and valvular: improved; EF 50-55% on echo 8/2022 from EF 30-35% on echo 4/2022; EF 55% prior to CABG 4/2021              - EF previously felt to be decreased with loss of SVG-OM and previous severe MR  Mitral regurgitation: mild-moderate on echo 8/2022; mild on ABHISHEK 10/2021; severe on echo 8/2021; likely related to papillary dysfunction/occluded dRCA, LCx, SVG  CAD: no current angina; severe native disease and occluded SVG-OM on [facility] 8/16/2021: s/p CABG x3 and BIMAL clip 5/3/2021  HTN: stable, historically lower BP  HLD: controlled, LDL 61, statin  ISI  DM  DANIELLE  COPD  Tracheomalacia  Anemia  RA  Chronic sacral decubitus ulcer: follows with wound clinic    Plan:   1. Update BMP to ensure renal function tolerating torsemide 40 mg daily; hold further metolazone, had good diuresis and now appears compensated  2. Continue spironolactone 12.5 mg daily, toprol 12.5 mg, 1/2 tablet entresto 24-26 mg BID  3. Continue atorvastatin and plavix  4. Sodium and fluid recommendations discussed; compression stockings  5. Check BP at home and call the office if consistently out of goal range  6.   Follow-up as planned 12/2023    Rasta Kate, APRN-CNP  [address] [facility]  [phone]

## 2023-10-04 ENCOUNTER — ANESTHESIA EVENT (OUTPATIENT)
Dept: ENDOSCOPY | Age: 73
End: 2023-10-04
Payer: MEDICARE

## 2023-10-04 NOTE — TELEPHONE ENCOUNTER
Merry Sims [phone]. Spoke w/ Barbie Colon and she informed me the Rx was for a patient assistance program. Rx was not mailed. Forms for pt assist. Were emailed to Barbie Colon @ [email or web address].     Barbie Colon voiced understanding and stated she would drop off completed forms to office. Will leave encounter open. Rx placed in folder.

## 2023-10-04 NOTE — TELEPHONE ENCOUNTER
Called [phone] (home)   Spoke w/ pt and informed Bronch was pushed back to 1145 and pt needed to arrive at 1045 now. I also informed pt Dr. Jolene Ashley f/u appt was scheduled for Nov 1, 23 @ 1030. Pt voiced verbal understanding. I also asked if pt still wanted Trelegy mailed. Pt stated she would like to have it mailed.  Mailed rx to [address]  this am.

## 2023-10-06 NOTE — PROGRESS NOTES
PRE OP INSTRUCTION SHEET   1. Do not eat or drink anything after 12 midnight  prior to surgery. This includes no water, chewing gum or mints. 2. Take the following pills will a small sip of water (see MAR)                                        3. Aspirin, Ibuprofen, Advil, Naproxen, Vitamin E, fish oil and other Anti-inflammatory products should be stopped for 5 days before surgery or as directed by your physician. 4. Check with your Doctor regarding stopping Plavix, Coumadin, Lovenox, Fragmin or other blood thinners   5. Do not smoke, and do not drink any alcoholic beverages 24 hours prior to surgery. This includes NA Beer. 6. You may brush your teeth and gargle the morning of surgery. DO NOT SWALLOW WATER   7. You MUST make arrangements for a responsible adult to take you home after your surgery. You will not be allowed to leave alone or drive yourself home. It is strongly suggested someone stay with you the first 24 hrs. Your surgery will be cancelled if you do not have a ride home. 8. A parent/legal guardian must accompany a child scheduled for surgery and plan to stay at the hospital until the child is discharged. Please do not bring other children with you. 9. Please wear simple, loose fitting clothing to the hospital.  Crescencio Martinez not bring valuables (money, credit cards, checkbooks, etc.) Do not wear any makeup (including no eye makeup) or nail polish on your fingers or toes. 10. DO NOT wear any jewelry or piercings on day of surgery. All body piercing jewelry must be removed. 11. If you have dentures,glasses, or contacts they will be removed before going to the OR; we will provide you a container. 12. Please see your family doctor/and cardiologist for a history & physical and/or concerning medications. Bring any test results/reports from your physician's office. Have history and labs faxed to [phone].  Remember to bring Blood Bank bracelet on the day of

## 2023-10-10 ENCOUNTER — HOSPITAL ENCOUNTER (EMERGENCY)
Age: 73
Discharge: HOME | End: 2023-10-10
Attending: STUDENT IN AN ORGANIZED HEALTH CARE EDUCATION/TRAINING PROGRAM
Payer: MEDICARE

## 2023-10-10 ENCOUNTER — APPOINTMENT (OUTPATIENT)
Dept: GENERAL RADIOLOGY | Age: 73
End: 2023-10-10
Payer: MEDICARE

## 2023-10-10 VITALS
SYSTOLIC BLOOD PRESSURE: 104 MMHG | BODY MASS INDEX: 26.51 KG/M2 | DIASTOLIC BLOOD PRESSURE: 56 MMHG | HEIGHT: 69 IN | RESPIRATION RATE: 18 BRPM | OXYGEN SATURATION: 97 % | TEMPERATURE: 98.7 F | WEIGHT: 179 LBS | HEART RATE: 71 BPM

## 2023-10-10 DIAGNOSIS — L89.156 PRESSURE INJURY OF DEEP TISSUE OF SACRAL REGION: Primary | ICD-10-CM

## 2023-10-10 LAB
ALBUMIN SERPL-MCNC: 3.6 G/DL (ref 3.4–5)
ALBUMIN/GLOB SERPL: 1.1 {RATIO} (ref 1.1–2.2)
ALP SERPL-CCNC: 75 U/L (ref 40–129)
ALT SERPL-CCNC: 12 U/L (ref 10–40)
ANION GAP SERPL CALCULATED.3IONS-SCNC: 9 MMOL/L (ref 3–16)
AST SERPL-CCNC: 17 U/L (ref 15–37)
BASOPHILS # BLD: 0.1 K/UL (ref 0–0.2)
BASOPHILS NFR BLD: 0.9 %
BILIRUB SERPL-MCNC: 0.7 MG/DL (ref 0–1)
BUN SERPL-MCNC: 21 MG/DL (ref 7–20)
CALCIUM SERPL-MCNC: 9.3 MG/DL (ref 8.3–10.6)
CHLORIDE SERPL-SCNC: 94 MMOL/L (ref 99–110)
CO2 SERPL-SCNC: 32 MMOL/L (ref 21–32)
CREAT SERPL-MCNC: 1.3 MG/DL (ref 0.6–1.2)
DEPRECATED RDW RBC AUTO: 14.9 % (ref 12.4–15.4)
EOSINOPHIL # BLD: 0.4 K/UL (ref 0–0.6)
EOSINOPHIL NFR BLD: 6.5 %
GFR SERPLBLD CREATININE-BSD FMLA CKD-EPI: 44 ML/MIN/{1.73_M2}
GLUCOSE SERPL-MCNC: 299 MG/DL (ref 70–99)
HCT VFR BLD AUTO: 31.5 % (ref 36–48)
HGB BLD-MCNC: 10.6 G/DL (ref 12–16)
LACTATE BLDV-SCNC: 1.6 MMOL/L (ref 0.4–2)
LYMPHOCYTES # BLD: 1.5 K/UL (ref 1–5.1)
LYMPHOCYTES NFR BLD: 24.1 %
MCH RBC QN AUTO: 30 PG (ref 26–34)
MCHC RBC AUTO-ENTMCNC: 33.6 G/DL (ref 31–36)
MCV RBC AUTO: 89.4 FL (ref 80–100)
MONOCYTES # BLD: 0.5 K/UL (ref 0–1.3)
MONOCYTES NFR BLD: 7.7 %
NEUTROPHILS # BLD: 3.8 K/UL (ref 1.7–7.7)
NEUTROPHILS NFR BLD: 60.8 %
PLATELET # BLD AUTO: 234 K/UL (ref 135–450)
PMV BLD AUTO: 7.9 FL (ref 5–10.5)
POTASSIUM SERPL-SCNC: 4.6 MMOL/L (ref 3.5–5.1)
PROT SERPL-MCNC: 6.8 G/DL (ref 6.4–8.2)
RBC # BLD AUTO: 3.52 M/UL (ref 4–5.2)
SODIUM SERPL-SCNC: 135 MMOL/L (ref 136–145)
WBC # BLD AUTO: 6.2 K/UL (ref 4–11)

## 2023-10-10 PROCEDURE — 99284 EMERGENCY DEPT VISIT MOD MDM: CPT

## 2023-10-10 PROCEDURE — 6370000000 HC RX 637 (ALT 250 FOR IP): Performed by: STUDENT IN AN ORGANIZED HEALTH CARE EDUCATION/TRAINING PROGRAM

## 2023-10-10 PROCEDURE — 96374 THER/PROPH/DIAG INJ IV PUSH: CPT

## 2023-10-10 PROCEDURE — 2580000003 HC RX 258: Performed by: STUDENT IN AN ORGANIZED HEALTH CARE EDUCATION/TRAINING PROGRAM

## 2023-10-10 PROCEDURE — 36415 COLL VENOUS BLD VENIPUNCTURE: CPT

## 2023-10-10 PROCEDURE — 85025 COMPLETE CBC W/AUTO DIFF WBC: CPT

## 2023-10-10 PROCEDURE — 6360000002 HC RX W HCPCS: Performed by: STUDENT IN AN ORGANIZED HEALTH CARE EDUCATION/TRAINING PROGRAM

## 2023-10-10 PROCEDURE — 83605 ASSAY OF LACTIC ACID: CPT

## 2023-10-10 PROCEDURE — 80053 COMPREHEN METABOLIC PANEL: CPT

## 2023-10-10 PROCEDURE — 72220 X-RAY EXAM SACRUM TAILBONE: CPT

## 2023-10-10 RX ORDER — OXYCODONE HYDROCHLORIDE AND ACETAMINOPHEN 5; 325 MG/1; MG/1
2 TABLET ORAL
Status: COMPLETED | OUTPATIENT
Start: 2023-10-10 | End: 2023-10-10

## 2023-10-10 RX ORDER — ONDANSETRON 2 MG/ML
4 INJECTION INTRAMUSCULAR; INTRAVENOUS ONCE
Status: COMPLETED | OUTPATIENT
Start: 2023-10-10 | End: 2023-10-10

## 2023-10-10 RX ORDER — DOXYCYCLINE HYCLATE 100 MG
100 TABLET ORAL ONCE
Status: COMPLETED | OUTPATIENT
Start: 2023-10-10 | End: 2023-10-10

## 2023-10-10 RX ORDER — 0.9 % SODIUM CHLORIDE 0.9 %
500 INTRAVENOUS SOLUTION INTRAVENOUS ONCE
Status: COMPLETED | OUTPATIENT
Start: 2023-10-10 | End: 2023-10-10

## 2023-10-10 RX ORDER — DOXYCYCLINE HYCLATE 100 MG
100 TABLET ORAL 2 TIMES DAILY
Qty: 14 TABLET | Refills: 0 | Status: SHIPPED | OUTPATIENT
Start: 2023-10-10 | End: 2023-10-17

## 2023-10-10 RX ADMIN — SODIUM CHLORIDE 500 ML: 9 INJECTION, SOLUTION INTRAVENOUS at 16:17

## 2023-10-10 RX ADMIN — ONDANSETRON 4 MG: 2 INJECTION INTRAMUSCULAR; INTRAVENOUS at 18:17

## 2023-10-10 RX ADMIN — DOXYCYCLINE HYCLATE 100 MG: 100 TABLET, COATED ORAL at 18:14

## 2023-10-10 RX ADMIN — OXYCODONE AND ACETAMINOPHEN 2 TABLET: 5; 325 TABLET ORAL at 16:17

## 2023-10-10 ASSESSMENT — PAIN - FUNCTIONAL ASSESSMENT: PAIN_FUNCTIONAL_ASSESSMENT: 0-10

## 2023-10-10 ASSESSMENT — PAIN SCALES - GENERAL: PAINLEVEL_OUTOF10: 10

## 2023-10-10 NOTE — ED NOTES
Blood Culture number 1 drawn from lac at 1444. Site cleaned with chlorhexidine swab for 30 seconds and allowed to dry for 30 seconds. Noted site was fully dry before cultures drawn.        Claudia Robbins RN  10/10/23 0455

## 2023-10-10 NOTE — DISCHARGE INSTRUCTIONS
Return to the nearest ED if you develop severe worsening pain, fevers, nausea and vomiting, other concerning symptoms. Take the doxycycline as prescribed. Follow-up with Dr. Geraldo Almazan tomorrow.
oral

## 2023-10-11 ENCOUNTER — HOSPITAL ENCOUNTER (OUTPATIENT)
Dept: WOUND CARE | Age: 73
Discharge: HOME | End: 2023-10-11
Payer: MEDICARE

## 2023-10-11 VITALS
HEART RATE: 86 BPM | DIASTOLIC BLOOD PRESSURE: 41 MMHG | WEIGHT: 182.2 LBS | HEIGHT: 69 IN | BODY MASS INDEX: 26.98 KG/M2 | SYSTOLIC BLOOD PRESSURE: 112 MMHG | RESPIRATION RATE: 18 BRPM | TEMPERATURE: 97.9 F

## 2023-10-11 DIAGNOSIS — I87.2 VENOUS INSUFFICIENCY OF LEFT LOWER EXTREMITY: Chronic | ICD-10-CM

## 2023-10-11 DIAGNOSIS — L89.154 PRESSURE ULCER OF COCCYGEAL REGION, STAGE 4 (HCC): Primary | ICD-10-CM

## 2023-10-11 DIAGNOSIS — M46.28 OSTEOMYELITIS OF SACRUM (HCC): ICD-10-CM

## 2023-10-11 PROCEDURE — 99213 OFFICE O/P EST LOW 20 MIN: CPT

## 2023-10-11 RX ORDER — TRIAMCINOLONE ACETONIDE 1 MG/G
OINTMENT TOPICAL ONCE
OUTPATIENT
Start: 2023-10-11 | End: 2023-10-11

## 2023-10-11 RX ORDER — LIDOCAINE 40 MG/G
CREAM TOPICAL ONCE
Status: DISCONTINUED | OUTPATIENT
Start: 2023-10-11 | End: 2023-10-12 | Stop reason: HOSPADM

## 2023-10-11 RX ORDER — TRIAMCINOLONE ACETONIDE 1 MG/G
OINTMENT TOPICAL ONCE
Status: DISCONTINUED | OUTPATIENT
Start: 2023-10-11 | End: 2023-10-12 | Stop reason: HOSPADM

## 2023-10-11 RX ORDER — BACITRACIN ZINC AND POLYMYXIN B SULFATE 500; 1000 [USP'U]/G; [USP'U]/G
OINTMENT TOPICAL ONCE
OUTPATIENT
Start: 2023-10-11 | End: 2023-10-11

## 2023-10-11 RX ORDER — LIDOCAINE HYDROCHLORIDE 40 MG/ML
SOLUTION TOPICAL ONCE
Status: DISCONTINUED | OUTPATIENT
Start: 2023-10-11 | End: 2023-10-12 | Stop reason: HOSPADM

## 2023-10-11 RX ORDER — LIDOCAINE HYDROCHLORIDE 40 MG/ML
SOLUTION TOPICAL ONCE
OUTPATIENT
Start: 2023-10-11 | End: 2023-10-11

## 2023-10-11 RX ORDER — LIDOCAINE 50 MG/G
OINTMENT TOPICAL ONCE
OUTPATIENT
Start: 2023-10-11 | End: 2023-10-11

## 2023-10-11 RX ORDER — LIDOCAINE 50 MG/G
OINTMENT TOPICAL ONCE
Status: DISCONTINUED | OUTPATIENT
Start: 2023-10-11 | End: 2023-10-12 | Stop reason: HOSPADM

## 2023-10-11 RX ORDER — BACITRACIN ZINC AND POLYMYXIN B SULFATE 500; 1000 [USP'U]/G; [USP'U]/G
OINTMENT TOPICAL ONCE
Status: DISCONTINUED | OUTPATIENT
Start: 2023-10-11 | End: 2023-10-12 | Stop reason: HOSPADM

## 2023-10-11 RX ORDER — LIDOCAINE 40 MG/G
CREAM TOPICAL ONCE
OUTPATIENT
Start: 2023-10-11 | End: 2023-10-11

## 2023-10-11 ASSESSMENT — PAIN - FUNCTIONAL ASSESSMENT: PAIN_FUNCTIONAL_ASSESSMENT: ACTIVITIES ARE NOT PREVENTED

## 2023-10-11 ASSESSMENT — PAIN DESCRIPTION - FREQUENCY: FREQUENCY: INTERMITTENT

## 2023-10-11 ASSESSMENT — PAIN DESCRIPTION - ORIENTATION: ORIENTATION: MID

## 2023-10-11 ASSESSMENT — PAIN DESCRIPTION - PAIN TYPE: TYPE: CHRONIC PAIN

## 2023-10-11 ASSESSMENT — PAIN DESCRIPTION - ONSET: ONSET: GRADUAL

## 2023-10-11 ASSESSMENT — PAIN DESCRIPTION - DESCRIPTORS: DESCRIPTORS: STABBING

## 2023-10-11 ASSESSMENT — PAIN DESCRIPTION - LOCATION: LOCATION: BUTTOCKS

## 2023-10-11 ASSESSMENT — PAIN SCALES - GENERAL: PAINLEVEL_OUTOF10: 10

## 2023-10-11 NOTE — ED PROVIDER NOTES
[address] [address] ED      CHIEF COMPLAINT  Wound Check (Has a sacral wound that she is seeing dr Jaya Ceron for this but having increased pain in the wound. Having more weakness as well. )       HISTORY OF PRESENT ILLNESS  Keiko Woody is a 68 y.o. female  who presents to the ED complaining of increasing pain of sacral wound with concerns for increased tunneling by her daughter when checking the wound earlier today. Denies any fevers, chills, nausea, vomiting. Patient states that her daughter, who is a nurse was doing her wound check and bandage change they were noticing increased tunneling at the 3:00 and 7 o'clock position. Has an appointment to see her wound care doctor, Dr. Jaya Ceron tomorrow. No other complaints, modifying factors or associated symptoms. I have reviewed the following from the nursing documentation.     Past Medical History:   Diagnosis Date    Acute on chronic diastolic heart failure due to coronary artery disease ([facility])     Acute respiratory failure with hypoxia ([facility])     Atherosclerosis of native artery of right lower extremity with rest pain ([address]) 07/25/2017    Back pain     Branch retinal vein occlusion 07/20/2012    Bronchiectasis with acute exacerbation ([address])     Candidal dermatitis 5/1/2023    Cellulitis and abscess of left leg 7/11/2021    Cellulitis of left lower extremity 7/11/2021    S/P vein harvest 05/2021 for CABG    CHF (congestive heart failure) ([facility])     Closed compression fracture of thoracic vertebra ([address]) 01/15/2020    Closed fracture of facial bone with routine healing 11/21/2016    Closed jaw fracture ([address]) 01/15/2020    Community acquired pneumonia of left lower lobe of lung     Compression fracture of L1 lumbar vertebra ([address]) 01/15/2020    COPD (chronic obstructive pulmonary disease) ([facility])     COPD exacerbation ([address])     COVID     Fracture of tibial plateau, closed, left, initial encounter 12/05/2017    HCAP (healthcare-associated pneumonia)     Minimally

## 2023-10-11 NOTE — PLAN OF CARE
[facility] Physician Orders and Discharge [facility]  [address] [place] [address], [address] [place] [address]  [place], [address]  Telephone: [phone]      Fax: [phone]        Your home care company:   [facility]         Your wound-care supplies will be provided by: [address] [place] [address] orders supplies through [employer]. Please note, depending on your insurance coverage, you may have out-of-pocket expenses for these supplies. Someone from [place] may call you to confirm your order and discuss those potential costs before they ship your products -- please anticipate that call. If your out-of-pocket cost is going to be less than $200, and [facility] cannot reach you, they may ship your supplies as soon as the order is processed, and will send you a bill. If your out-of-pocket cost is going to be more than $200, [place] should not ship your supplies until they speak with you. If you have any questions about your supplies or your potential out-of-pocket costs, or if you need to place an order for a refill of supplies (typically monthly), Adebayo Salvador is your local representative, and can be reached at [phone]. Information on [employer]'s return policy will also be enclosed with your supplies -- please read that carefully before opening your items. NAME:  Vidal Taveras   YOB: 1950  PRIMARY DIAGNOSIS FOR WOUND CARE CENTER:  Pressure ulcer     Wound cleansing:  Do not scrub or use excessive force. Wash hands with soap and water before and after dressing changes. Prior to applying a clean dressing, cleanse wound with normal saline, wound cleanser, or mild soap and water. Ask your physician or nurse before getting the wound(s) wet in the shower.                 Wound care for home:      Sacral Wound:  Generous amount of Betadine maegan wound  Triad maegan wound  Opticell Ag rope into wound - please give patient remainder today   Cover with Mepilex Border  Change daily or as needed for

## 2023-10-11 NOTE — PLAN OF CARE
Patient returning to [address] [place] [address] today for concern for increased wound pain, decreased appetite & not feeling well. Dr Armando Srivastava discussed lab work & x-ray results from her ER visit. Wound exam per MD, no debridement. Dr Armando Srivastava discussed scheduling an MRI to further evaluate for osteomyelitis. Patient aware if osteomyelitis noted, then surgical debridement & IV antibiotics would be the next step, both pt. & daughter verbalize understanding. Pt. Is using her group II mattress & ROHO cushion for off-loading. Will wait to schedule follow up until after MRI results obtained & then Dr Armando Srivastava will be in touch. Pt. & daughter aware to call sooner with any changes or questions/concerns. Discharge instructions reviewed with patient & daughter, all questions answered, copy given to patient. Dressings were applied to all wounds per M.D. Instructions at this visit.

## 2023-10-11 NOTE — PROGRESS NOTES
Called [facility] an scheduling patient's MRI of her coccyx. MRI is scheduled for 10/16/23 at 4:00 pm, arrive at 3:30 at [facility]. Called patient's daughter Jaelyn at [phone] and left message on voice mail.

## 2023-10-11 NOTE — DISCHARGE INSTRUCTIONS
drainage        Please note, all wounds (unless stated otherwise here) were mechanically debrided at the time of cleansing here in the wound-care center today, so a small amount of pain, drainage or bleeding from that process might be expected, and is normal.     All products for home use, including multiple products for a single wound if applicable, are medically necessary in order to achieve the best chance at timely wound healing. See provider documentation for details if needed. Substituted dressings applied in the [facility] ER today, if applicable:     N/a     New orders for this week (labs, imaging, medications, etc.):     STOP or DO NOT start the antibiotics ordered by the ER per Dr Marily Lim. Please complete the MRI checklist & schedule MRI per MD order. Additional instructions for specific diagnoses:     10/11/23 - Possible surgical debridement with Dr Tha Schmidt culture discussed today. You would also need IV antibiotics if osteomyelitis noted with possible NPWT to assist in wound healing. +ROHO cushion- use at ALL TIMES when sitting!!  +True Balance Air group II mattress      General comments for pressure ulcers: *  Make sure you stay well-hydrated, and maintain good protein intake. *  Reposition at least every two hours, to keep from having pressure in one spot for too long. *  If you are not sure whether you have the best offloading surfaces (mattress, mattress overlay, chair cushion, heel-protector boots, etc), please ask. *  Moisturize your skin regularly with Vaseline, Aquaphor, Aveeno, CeraVe, Cetaphil, Eucerin, Lubriderm, etc; but keep the skin between your toes dry. *  If you smoke, your wound can not heal properly -- please talk with us when you're ready to quit. Dr Marily Lim will be in touch after MRI results obtained to discuss plan of care moving forward.       Your nurse  is Fritz Yin RN     If we applied slip-resistant hospital socks today, be sure to

## 2023-10-13 ENCOUNTER — HOSPITAL ENCOUNTER (OUTPATIENT)
Age: 73
Setting detail: OUTPATIENT SURGERY
Discharge: HOME | End: 2023-10-13
Attending: INTERNAL MEDICINE | Admitting: INTERNAL MEDICINE
Payer: MEDICARE

## 2023-10-13 ENCOUNTER — ANESTHESIA (OUTPATIENT)
Dept: ENDOSCOPY | Age: 73
End: 2023-10-13
Payer: MEDICARE

## 2023-10-13 VITALS
HEART RATE: 68 BPM | WEIGHT: 179 LBS | SYSTOLIC BLOOD PRESSURE: 104 MMHG | DIASTOLIC BLOOD PRESSURE: 47 MMHG | HEIGHT: 69 IN | OXYGEN SATURATION: 96 % | BODY MASS INDEX: 26.51 KG/M2 | RESPIRATION RATE: 16 BRPM | TEMPERATURE: 97 F

## 2023-10-13 LAB
ANION GAP SERPL CALCULATED.3IONS-SCNC: 12 MMOL/L (ref 3–16)
APPEARANCE BRONCH: CLEAR
BUN SERPL-MCNC: 24 MG/DL (ref 7–20)
CALCIUM SERPL-MCNC: 9.3 MG/DL (ref 8.3–10.6)
CHLORIDE SERPL-SCNC: 93 MMOL/L (ref 99–110)
CLOT SPEC QL: ABNORMAL
CO2 SERPL-SCNC: 27 MMOL/L (ref 21–32)
COLOR BRONCH: COLORLESS
CREAT SERPL-MCNC: 1.5 MG/DL (ref 0.6–1.2)
DEPRECATED RDW RBC AUTO: 14.4 % (ref 12.4–15.4)
EOSINOPHIL NFR BRONCH MANUAL: 32 %
GFR SERPLBLD CREATININE-BSD FMLA CKD-EPI: 37 ML/MIN/{1.73_M2}
GLUCOSE BLD-MCNC: 226 MG/DL (ref 70–99)
GLUCOSE SERPL-MCNC: 233 MG/DL (ref 70–99)
HCT VFR BLD AUTO: 30.3 % (ref 36–48)
HGB BLD-MCNC: 10.4 G/DL (ref 12–16)
LYMPHOCYTES NFR BRONCH MANUAL: 2 % (ref 5–10)
MACROPHAGES NFR BRONCH MANUAL: 29 % (ref 90–95)
MCH RBC QN AUTO: 30 PG (ref 26–34)
MCHC RBC AUTO-ENTMCNC: 34.2 G/DL (ref 31–36)
MCV RBC AUTO: 87.9 FL (ref 80–100)
MONOCYTES NFR BRONCH MANUAL: 1 %
NEUTROPHILS NFR BRONCH MANUAL: 36 % (ref 5–10)
NUC CELL # BRONCH MANUAL: 64 /CUMM
PATH REV: NO
PERFORMED ON: ABNORMAL
PLATELET # BLD AUTO: 231 K/UL (ref 135–450)
PMV BLD AUTO: 7.7 FL (ref 5–10.5)
POTASSIUM SERPL-SCNC: 4.3 MMOL/L (ref 3.5–5.1)
RBC # BLD AUTO: 3.45 M/UL (ref 4–5.2)
RBC # FLD MANUAL: 100 /CUMM
SODIUM SERPL-SCNC: 132 MMOL/L (ref 136–145)
TOTAL CELLS COUNTED BRONCH: 100
WBC # BLD AUTO: 5.1 K/UL (ref 4–11)

## 2023-10-13 PROCEDURE — 3700000000 HC ANESTHESIA ATTENDED CARE: Performed by: INTERNAL MEDICINE

## 2023-10-13 PROCEDURE — 7100000010 HC PHASE II RECOVERY - FIRST 15 MIN: Performed by: INTERNAL MEDICINE

## 2023-10-13 PROCEDURE — 87205 SMEAR GRAM STAIN: CPT

## 2023-10-13 PROCEDURE — 2500000003 HC RX 250 WO HCPCS: Performed by: ANESTHESIOLOGY

## 2023-10-13 PROCEDURE — 6360000002 HC RX W HCPCS: Performed by: NURSE ANESTHETIST, CERTIFIED REGISTERED

## 2023-10-13 PROCEDURE — 87102 FUNGUS ISOLATION CULTURE: CPT

## 2023-10-13 PROCEDURE — 2500000003 HC RX 250 WO HCPCS: Performed by: NURSE ANESTHETIST, CERTIFIED REGISTERED

## 2023-10-13 PROCEDURE — 88312 SPECIAL STAINS GROUP 1: CPT

## 2023-10-13 PROCEDURE — 2709999900 HC NON-CHARGEABLE SUPPLY: Performed by: INTERNAL MEDICINE

## 2023-10-13 PROCEDURE — 87116 MYCOBACTERIA CULTURE: CPT

## 2023-10-13 PROCEDURE — 88112 CYTOPATH CELL ENHANCE TECH: CPT

## 2023-10-13 PROCEDURE — 87077 CULTURE AEROBIC IDENTIFY: CPT

## 2023-10-13 PROCEDURE — 2580000003 HC RX 258: Performed by: NURSE ANESTHETIST, CERTIFIED REGISTERED

## 2023-10-13 PROCEDURE — 88305 TISSUE EXAM BY PATHOLOGIST: CPT

## 2023-10-13 PROCEDURE — 7100000011 HC PHASE II RECOVERY - ADDTL 15 MIN: Performed by: INTERNAL MEDICINE

## 2023-10-13 PROCEDURE — 87070 CULTURE OTHR SPECIMN AEROBIC: CPT

## 2023-10-13 PROCEDURE — 85027 COMPLETE CBC AUTOMATED: CPT

## 2023-10-13 PROCEDURE — 31624 DX BRONCHOSCOPE/LAVAGE: CPT | Performed by: INTERNAL MEDICINE

## 2023-10-13 PROCEDURE — 3609010800 HC BRONCHOSCOPY ALVEOLAR LAVAGE: Performed by: INTERNAL MEDICINE

## 2023-10-13 PROCEDURE — 80048 BASIC METABOLIC PNL TOTAL CA: CPT

## 2023-10-13 PROCEDURE — 89051 BODY FLUID CELL COUNT: CPT

## 2023-10-13 PROCEDURE — 36415 COLL VENOUS BLD VENIPUNCTURE: CPT

## 2023-10-13 PROCEDURE — 31645 BRNCHSC W/THER ASPIR 1ST: CPT | Performed by: INTERNAL MEDICINE

## 2023-10-13 PROCEDURE — 3609010900 HC BRONCHOSCOPY THERAPUTIC ASPIRATION INITIAL: Performed by: INTERNAL MEDICINE

## 2023-10-13 PROCEDURE — 87206 SMEAR FLUORESCENT/ACID STAI: CPT

## 2023-10-13 PROCEDURE — 87186 SC STD MICRODIL/AGAR DIL: CPT

## 2023-10-13 RX ORDER — PROPOFOL 10 MG/ML
INJECTION, EMULSION INTRAVENOUS PRN
Status: DISCONTINUED | OUTPATIENT
Start: 2023-10-13 | End: 2023-10-13 | Stop reason: SDUPTHER

## 2023-10-13 RX ORDER — GLYCOPYRROLATE 0.2 MG/ML
INJECTION INTRAMUSCULAR; INTRAVENOUS PRN
Status: DISCONTINUED | OUTPATIENT
Start: 2023-10-13 | End: 2023-10-13 | Stop reason: SDUPTHER

## 2023-10-13 RX ORDER — MEPERIDINE HYDROCHLORIDE 25 MG/ML
12.5 INJECTION INTRAMUSCULAR; INTRAVENOUS; SUBCUTANEOUS EVERY 5 MIN PRN
Status: CANCELLED | OUTPATIENT
Start: 2023-10-13

## 2023-10-13 RX ORDER — SODIUM CHLORIDE 0.9 % (FLUSH) 0.9 %
5-40 SYRINGE (ML) INJECTION PRN
Status: CANCELLED | OUTPATIENT
Start: 2023-10-13

## 2023-10-13 RX ORDER — LIDOCAINE HYDROCHLORIDE 20 MG/ML
INJECTION, SOLUTION EPIDURAL; INFILTRATION; INTRACAUDAL; PERINEURAL PRN
Status: DISCONTINUED | OUTPATIENT
Start: 2023-10-13 | End: 2023-10-13 | Stop reason: SDUPTHER

## 2023-10-13 RX ORDER — SODIUM CHLORIDE 0.9 % (FLUSH) 0.9 %
5-40 SYRINGE (ML) INJECTION EVERY 12 HOURS SCHEDULED
Status: CANCELLED | OUTPATIENT
Start: 2023-10-13

## 2023-10-13 RX ORDER — SODIUM CHLORIDE, SODIUM LACTATE, POTASSIUM CHLORIDE, CALCIUM CHLORIDE 600; 310; 30; 20 MG/100ML; MG/100ML; MG/100ML; MG/100ML
INJECTION, SOLUTION INTRAVENOUS CONTINUOUS PRN
Status: DISCONTINUED | OUTPATIENT
Start: 2023-10-13 | End: 2023-10-13 | Stop reason: SDUPTHER

## 2023-10-13 RX ORDER — FAMOTIDINE 10 MG/ML
20 INJECTION, SOLUTION INTRAVENOUS ONCE
Status: COMPLETED | OUTPATIENT
Start: 2023-10-13 | End: 2023-10-13

## 2023-10-13 RX ORDER — SODIUM CHLORIDE 0.9 % (FLUSH) 0.9 %
5-40 SYRINGE (ML) INJECTION PRN
Status: DISCONTINUED | OUTPATIENT
Start: 2023-10-13 | End: 2023-10-13 | Stop reason: HOSPADM

## 2023-10-13 RX ORDER — ONDANSETRON 2 MG/ML
4 INJECTION INTRAMUSCULAR; INTRAVENOUS
Status: CANCELLED | OUTPATIENT
Start: 2023-10-13 | End: 2023-10-14

## 2023-10-13 RX ORDER — SODIUM CHLORIDE 9 MG/ML
INJECTION, SOLUTION INTRAVENOUS PRN
Status: DISCONTINUED | OUTPATIENT
Start: 2023-10-13 | End: 2023-10-13 | Stop reason: HOSPADM

## 2023-10-13 RX ORDER — SODIUM CHLORIDE 9 MG/ML
INJECTION, SOLUTION INTRAVENOUS PRN
Status: CANCELLED | OUTPATIENT
Start: 2023-10-13

## 2023-10-13 RX ORDER — OXYCODONE HYDROCHLORIDE 5 MG/1
5 TABLET ORAL PRN
Status: CANCELLED | OUTPATIENT
Start: 2023-10-13 | End: 2023-10-13

## 2023-10-13 RX ORDER — OXYCODONE HYDROCHLORIDE 5 MG/1
10 TABLET ORAL PRN
Status: CANCELLED | OUTPATIENT
Start: 2023-10-13 | End: 2023-10-13

## 2023-10-13 RX ORDER — SODIUM CHLORIDE, SODIUM LACTATE, POTASSIUM CHLORIDE, CALCIUM CHLORIDE 600; 310; 30; 20 MG/100ML; MG/100ML; MG/100ML; MG/100ML
INJECTION, SOLUTION INTRAVENOUS CONTINUOUS
Status: DISCONTINUED | OUTPATIENT
Start: 2023-10-13 | End: 2023-10-13 | Stop reason: HOSPADM

## 2023-10-13 RX ORDER — SODIUM CHLORIDE 0.9 % (FLUSH) 0.9 %
5-40 SYRINGE (ML) INJECTION EVERY 12 HOURS SCHEDULED
Status: DISCONTINUED | OUTPATIENT
Start: 2023-10-13 | End: 2023-10-13 | Stop reason: HOSPADM

## 2023-10-13 RX ADMIN — LIDOCAINE HYDROCHLORIDE 80 MG: 20 INJECTION, SOLUTION EPIDURAL; INFILTRATION; INTRACAUDAL; PERINEURAL at 11:42

## 2023-10-13 RX ADMIN — FAMOTIDINE 20 MG: 10 INJECTION, SOLUTION INTRAVENOUS at 11:27

## 2023-10-13 RX ADMIN — SODIUM CHLORIDE, POTASSIUM CHLORIDE, SODIUM LACTATE AND CALCIUM CHLORIDE: 600; 310; 30; 20 INJECTION, SOLUTION INTRAVENOUS at 11:40

## 2023-10-13 RX ADMIN — GLYCOPYRROLATE 0.1 MG: 0.2 INJECTION, SOLUTION INTRAMUSCULAR; INTRAVENOUS at 11:40

## 2023-10-13 RX ADMIN — PROPOFOL 100 MG: 10 INJECTION, EMULSION INTRAVENOUS at 11:42

## 2023-10-13 ASSESSMENT — LIFESTYLE VARIABLES: SMOKING_STATUS: 0

## 2023-10-13 ASSESSMENT — ENCOUNTER SYMPTOMS: SHORTNESS OF BREATH: 1

## 2023-10-13 ASSESSMENT — PAIN - FUNCTIONAL ASSESSMENT: PAIN_FUNCTIONAL_ASSESSMENT: 0-10

## 2023-10-13 NOTE — PROGRESS NOTES
IV x 1 removed per discharge hemostasis achieved, dressing applied, no complications noted. Patient denies further needs. Pt transported to private car via wheel chair. Vitals per doc flow, pt daughter Angie Bach assumes responsibility.

## 2023-10-13 NOTE — PROGRESS NOTES
Post bronch FSBS obtained. Blood glucose 226. Dr. Arabella Rider notified. No further orders received at this time.

## 2023-10-13 NOTE — ANESTHESIA PRE PROCEDURE
Department of Anesthesiology  Preprocedure Note       Name:  Allyson El   Age:  68 y.o.  :  1950                                          MRN:  [id number]         Date:  10/13/2023      Surgeon: Jose Solorio):  Sisi Carrero MD    Procedure: Procedure(s):  BRONCH NF W/ANES. (11:15)    Medications prior to admission:   Prior to Admission medications    Medication Sig Start Date End Date Taking?  Authorizing Provider   doxycycline hyclate (VIBRA-TABS) 100 MG tablet Take 1 tablet by mouth 2 times daily for 7 days 10/10/23 10/17/23  Ibeth Lennon DO   azithromycin (ZITHROMAX) 250 MG tablet Take 1 tablet by mouth daily 23   Provider, MD Cynthia   fluticasone-umeclidin-vilant [place]ri[place] Stabs ELLIPTA) 200-62.5-25 MCG/ACT AEPB inhaler Inhale 1 puff into the lungs daily 10/3/23   Sisi Carrero MD   fluconazole (DIFLUCAN) 100 MG tablet 2 tabs on day 1 and then 1 tab daily until gone; do not take azithromycin while taking fluconazole 10/3/23   Sisi Carrero MD   albuterol sulfate HFA (PROVENTIL HFA) 108 (90 Base) MCG/ACT inhaler Inhale 2 puffs into the lungs every 4 hours as needed for Wheezing or Shortness of Breath 10/3/23   Sisi Carrero MD   torsemide [facility]) 20 MG tablet Take 2 tablets by mouth daily 23   LORENZO Salcedo CNP   spironolactone (ALDACTONE) 25 MG tablet Take 0.5 tablets by mouth daily 23   LORENZO Salcedo CNP   benzonatate (TESSALON) 100 MG capsule Take 2 capsules by mouth 3 times daily as needed for Cough 23   Rubén Batres PA-C   sacubitril-valsartan [place]) 24-26 MG per tablet Take half tablet twice a day 23   Kylie Palacios MD   metoprolol succinate (TOPROL XL) 25 MG extended release tablet Take 0.5 tablets by mouth daily 23   Kylie Palacios MD   Roflumilast (DALIRESP) 500 MCG tablet Take 1 tablet by mouth daily 23   Sisi Carrero MD   Abatacept (ORENCIA IV) Infuse intravenously every 30 days    Provider, MD Cynthia

## 2023-10-13 NOTE — PROGRESS NOTES
Pt arrived to PACU from [facility] in stable condition. Report received from [facility] and [facility]. Phase II. Care of pt transferred at this time. Pt  placed on cont pulse ox and BP. Pt arrived to unit without any oxygen requirements. SPO2 97% on RA.

## 2023-10-13 NOTE — PROCEDURES
PROCEDURE:  BRONCHOSCOPY WITH BRONCHOALVEOLAR LAVAGE    Anesthesia kindly provided TIVA. The scope was passed with ease via the mouth. A complete airway inspection was performed. Severe tracheobronchomalacia was present with severe >75% collapse of trachea and mainstem airways even with quiet respiration. No endobronchial lesions were identified. There was a moderate amount of relatively think secretions throughout the airways. Therapeutic aspiration was performed of secretions in the right lower lobe. Washings were obtained throughout the airways. A Bronchoalveolar lavage was obtained from the right middle lobe with good return. Estimated blood loss was less than 5 ml. The patient tolerated the procedure well. Recovery will be per endoscopy protocol. FOLLOW UP:  Cultures and cytology in three to five days.

## 2023-10-13 NOTE — PROGRESS NOTES
Discharge instructions explained in detail at bedside to both pt and pts daughter Mayela Howe. Pt and daughter received copy of discharge instructions. Pt  and daughter deny having any questions about discharge.

## 2023-10-13 NOTE — H&P
Fiberoptic bronchoscopy history:     Patient with chronic cough; requires fiberoptic bronchoscopy with probable therapeutic aspiration     Patient is allergic to atenolol. Past Medical History:   Diagnosis Date    Acute on chronic diastolic heart failure due to coronary artery disease ([facility])     Acute respiratory failure with hypoxia ([facility])     Atherosclerosis of native artery of right lower extremity with rest pain ([address]) 07/25/2017    Back pain     Branch retinal vein occlusion 07/20/2012    Bronchiectasis with acute exacerbation ([address])     Candidal dermatitis 05/01/2023    Cellulitis and abscess of left leg 07/11/2021    Cellulitis of left lower extremity 07/11/2021    S/P vein harvest 05/2021 for CABG    CHF (congestive heart failure) ([facility])     Closed compression fracture of thoracic vertebra ([address]) 01/15/2020    Closed fracture of facial bone with routine healing 11/21/2016    Closed jaw fracture ([address]) 01/15/2020    Community acquired pneumonia of left lower lobe of lung     Compression fracture of L1 lumbar vertebra ([address]) 01/15/2020    COPD (chronic obstructive pulmonary disease) ([facility])     COPD exacerbation ([address])     COVID     Diabetes mellitus ([address])     Fracture of tibial plateau, closed, left, initial encounter 12/05/2017    HCAP (healthcare-associated pneumonia)     Minimally displaced zone I fracture of sacrum ([address]) 09/02/2020    MRSA (methicillin resistant staph aureus) culture positive 07/2021    MRSA (methicillin resistant Staphylococcus aureus) 07/13/2021    wound    Mucus plugging of bronchi     NSTEMI (non-ST elevated myocardial infarction) ([address]) 04/23/2021    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 Bone density test last year showed severe osteoporosis. Recently, two broken vertebrae (L1, L2) due to coughing.  Diagnosed with tracheomalacia and stated she must cough very hard to clear phlegm. Was coughing due to upper respiratory infections which have been treated. Hx of laminectomy and recent kyphoplasty.    Refractured her jawbone which was previously repaired wi    Pneumonia of left lower lobe due to infectious organism     Post herpetic neuralgia     Pressure ulcer of left heel, stage 3 ([address]) 01/12/2022    Proximal humerus fracture 10/01/2019    Rheumatoid arthritis ([address])     Shingles 05/2020    Sleep apnea     Status post incision and drainage 07/2021    Left Leg    Surgical wound dehiscence, initial encounter (at [facility] harvest site) 07/12/2021    Temporal arteritis ([address]) 07/10/2013    Tobacco use 10/19/2017    Tracheomalacia     Vitreous hemorrhage, right eye ([address]) 02/21/2020       Past Surgical History:   Procedure Laterality Date    ARTERY BIOPSY Right 03/01/2021    at [address]  05/30/2013    left temporal artery biopsy    BACK SURGERY  08/2020    BRONCHOSCOPY      BRONCHOSCOPY N/A 06/12/2019    BRONCHOSCOPY ALVEOLAR LAVAGE performed by Ova Canavan, MD at [id number] [place] [address]  05/03/2021    CATARACT REMOVAL      COLONOSCOPY      CORONARY ARTERY BYPASS GRAFT N/A 05/03/2021    CORONARY ARTERY BYPASS X3 WITH LEFT ATRIAL APPENDAGE CLIP, 5 LEVEL BILATERAL INTERCOSTAL NERVE BLOCK, STERNAL PLATING performed by James Guevara MD at [address] [facility] CATH  7/14/2021     MIDLINE CATH 7/14/2021 [id number] [place]  ARTHROSCOPY      left    KYPHOSIS SURGERY      LAMINECTOMY      LEG SURGERY Left 7/13/2021    INCISION AND DEBRIDEMENT LEFT LOWER LEG WOUND WITH POSSIBLE WOUND VAC PLACEMENT performed by Margot Mustafa MD at [facility]  02/2020    MEDIASTINOSCOPY N/A 05/05/2021    MEDIASTINAL EXPLORATION AND EVACUATION OF HEMATOMA performed by James Guevara MD at [facility]

## 2023-10-14 LAB
LOEFFLER MB STN SPEC: NORMAL
LOEFFLER MB STN SPEC: NORMAL

## 2023-10-15 LAB
ACID FAST STN SPEC QL: NORMAL
ACID FAST STN SPEC QL: NORMAL
BACTERIA SPEC RESP CULT: ABNORMAL
GRAM STN SPEC: ABNORMAL
GRAM STN SPEC: ABNORMAL
ORGANISM: ABNORMAL
ORGANISM: ABNORMAL

## 2023-10-16 ENCOUNTER — APPOINTMENT (OUTPATIENT)
Dept: CT IMAGING | Age: 73
DRG: 871 | End: 2023-10-16
Payer: MEDICARE

## 2023-10-16 ENCOUNTER — HOSPITAL ENCOUNTER (OUTPATIENT)
Dept: MRI IMAGING | Age: 73
Discharge: HOME | DRG: 871 | End: 2023-10-16
Attending: INTERNAL MEDICINE
Payer: MEDICARE

## 2023-10-16 ENCOUNTER — HOSPITAL ENCOUNTER (INPATIENT)
Age: 73
LOS: 2 days | Discharge: ANOTHER HOSPITAL | DRG: 871 | End: 2023-10-19
Attending: EMERGENCY MEDICINE | Admitting: INTERNAL MEDICINE
Payer: MEDICARE

## 2023-10-16 ENCOUNTER — APPOINTMENT (OUTPATIENT)
Dept: GENERAL RADIOLOGY | Age: 73
DRG: 871 | End: 2023-10-16
Payer: MEDICARE

## 2023-10-16 DIAGNOSIS — N18.9 ACUTE KIDNEY INJURY SUPERIMPOSED ON CKD (HCC): ICD-10-CM

## 2023-10-16 DIAGNOSIS — L89.154 PRESSURE ULCER OF COCCYGEAL REGION, STAGE 4 (HCC): ICD-10-CM

## 2023-10-16 DIAGNOSIS — M86.9 OSTEOMYELITIS OF OTHER SITE, UNSPECIFIED TYPE (HCC): Primary | ICD-10-CM

## 2023-10-16 DIAGNOSIS — M46.28 OSTEOMYELITIS OF SACRUM (HCC): ICD-10-CM

## 2023-10-16 DIAGNOSIS — N17.9 ACUTE KIDNEY INJURY SUPERIMPOSED ON CKD (HCC): ICD-10-CM

## 2023-10-16 DIAGNOSIS — I95.9 HYPOTENSION, UNSPECIFIED HYPOTENSION TYPE: ICD-10-CM

## 2023-10-16 PROBLEM — M86.68 CHRONIC OSTEOMYELITIS OF SACRUM (HCC): Status: ACTIVE | Noted: 2023-10-16

## 2023-10-16 LAB
ALBUMIN SERPL-MCNC: 3.5 G/DL (ref 3.4–5)
ALBUMIN/GLOB SERPL: 1.1 {RATIO} (ref 1.1–2.2)
ALP SERPL-CCNC: 77 U/L (ref 40–129)
ALT SERPL-CCNC: 10 U/L (ref 10–40)
ANION GAP SERPL CALCULATED.3IONS-SCNC: 13 MMOL/L (ref 3–16)
AST SERPL-CCNC: 15 U/L (ref 15–37)
BACTERIA URNS QL MICRO: ABNORMAL /HPF
BASE EXCESS BLDV CALC-SCNC: -1 MMOL/L (ref -3–3)
BASOPHILS # BLD: 0 K/UL (ref 0–0.2)
BASOPHILS NFR BLD: 0.8 %
BILIRUB SERPL-MCNC: 0.5 MG/DL (ref 0–1)
BILIRUB UR QL STRIP.AUTO: NEGATIVE
BUN SERPL-MCNC: 32 MG/DL (ref 7–20)
CALCIUM SERPL-MCNC: 9.7 MG/DL (ref 8.3–10.6)
CHLORIDE SERPL-SCNC: 88 MMOL/L (ref 99–110)
CLARITY UR: CLEAR
CO2 BLDV-SCNC: 22 MMOL/L
CO2 SERPL-SCNC: 26 MMOL/L (ref 21–32)
COHGB MFR BLDV: 4.5 % (ref 0–1.5)
COLOR UR: YELLOW
CREAT SERPL-MCNC: 2.6 MG/DL (ref 0.6–1.2)
CRP SERPL-MCNC: 26.4 MG/L (ref 0–5.1)
DEPRECATED RDW RBC AUTO: 14.3 % (ref 12.4–15.4)
EOSINOPHIL # BLD: 0.3 K/UL (ref 0–0.6)
EOSINOPHIL NFR BLD: 4.5 %
EPI CELLS #/AREA URNS HPF: ABNORMAL /HPF (ref 0–5)
ERYTHROCYTE [SEDIMENTATION RATE] IN BLOOD BY WESTERGREN METHOD: 88 MM/HR (ref 0–30)
FLUAV RNA RESP QL NAA+PROBE: NOT DETECTED
FLUBV RNA RESP QL NAA+PROBE: NOT DETECTED
GFR SERPLBLD CREATININE-BSD FMLA CKD-EPI: 19 ML/MIN/{1.73_M2}
GLUCOSE BLD-MCNC: 138 MG/DL (ref 70–99)
GLUCOSE SERPL-MCNC: 148 MG/DL (ref 70–99)
GLUCOSE UR STRIP.AUTO-MCNC: NEGATIVE MG/DL
HCO3 BLDV-SCNC: 21.5 MMOL/L (ref 23–29)
HCT VFR BLD AUTO: 28.1 % (ref 36–48)
HGB BLD-MCNC: 9.7 G/DL (ref 12–16)
HGB UR QL STRIP.AUTO: ABNORMAL
HYALINE CASTS #/AREA URNS LPF: ABNORMAL /LPF (ref 0–2)
INR PPP: 1.09 (ref 0.84–1.16)
KETONES UR STRIP.AUTO-MCNC: NEGATIVE MG/DL
LACTATE BLDV-SCNC: 0.4 MMOL/L (ref 0.4–1.9)
LACTATE BLDV-SCNC: 1.8 MMOL/L (ref 0.4–1.9)
LEUKOCYTE ESTERASE UR QL STRIP.AUTO: NEGATIVE
LYMPHOCYTES # BLD: 0.9 K/UL (ref 1–5.1)
LYMPHOCYTES NFR BLD: 14.8 %
MCH RBC QN AUTO: 30.8 PG (ref 26–34)
MCHC RBC AUTO-ENTMCNC: 34.5 G/DL (ref 31–36)
MCV RBC AUTO: 89.1 FL (ref 80–100)
METHGB MFR BLDV: 0.3 %
MONOCYTES # BLD: 0.3 K/UL (ref 0–1.3)
MONOCYTES NFR BLD: 5.8 %
NEUTROPHILS # BLD: 4.4 K/UL (ref 1.7–7.7)
NEUTROPHILS NFR BLD: 74.1 %
NITRITE UR QL STRIP.AUTO: NEGATIVE
NT-PROBNP SERPL-MCNC: 1322 PG/ML (ref 0–124)
O2 THERAPY: ABNORMAL
PCO2 BLDV: 28.7 MMHG (ref 40–50)
PERFORMED ON: ABNORMAL
PH BLDV: 7.49 [PH] (ref 7.35–7.45)
PH UR STRIP.AUTO: 6 [PH] (ref 5–8)
PLATELET # BLD AUTO: 220 K/UL (ref 135–450)
PMV BLD AUTO: 7.8 FL (ref 5–10.5)
PO2 BLDV: 90.9 MMHG (ref 25–40)
POTASSIUM SERPL-SCNC: 4.6 MMOL/L (ref 3.5–5.1)
PROCALCITONIN SERPL IA-MCNC: 0.09 NG/ML (ref 0–0.15)
PROT SERPL-MCNC: 6.8 G/DL (ref 6.4–8.2)
PROT UR STRIP.AUTO-MCNC: NEGATIVE MG/DL
PROTHROMBIN TIME: 14.1 SEC (ref 11.5–14.8)
RBC # BLD AUTO: 3.15 M/UL (ref 4–5.2)
RBC #/AREA URNS HPF: ABNORMAL /HPF (ref 0–4)
RENAL EPI CELLS #/AREA UR COMP ASSIST: ABNORMAL /HPF (ref 0–1)
SAO2 % BLDV: 98 %
SARS-COV-2 RNA RESP QL NAA+PROBE: NOT DETECTED
SODIUM SERPL-SCNC: 127 MMOL/L (ref 136–145)
SP GR UR STRIP.AUTO: 1.01 (ref 1–1.03)
TROPONIN, HIGH SENSITIVITY: 47 NG/L (ref 0–14)
TROPONIN, HIGH SENSITIVITY: 64 NG/L (ref 0–14)
UA COMPLETE W REFLEX CULTURE PNL UR: YES
UA DIPSTICK W REFLEX MICRO PNL UR: YES
URN SPEC COLLECT METH UR: ABNORMAL
UROBILINOGEN UR STRIP-ACNC: 0.2 E.U./DL
WBC # BLD AUTO: 6 K/UL (ref 4–11)
WBC #/AREA URNS HPF: ABNORMAL /HPF (ref 0–5)

## 2023-10-16 PROCEDURE — 81001 URINALYSIS AUTO W/SCOPE: CPT

## 2023-10-16 PROCEDURE — 96361 HYDRATE IV INFUSION ADD-ON: CPT

## 2023-10-16 PROCEDURE — 80053 COMPREHEN METABOLIC PANEL: CPT

## 2023-10-16 PROCEDURE — 70450 CT HEAD/BRAIN W/O DYE: CPT

## 2023-10-16 PROCEDURE — 87636 SARSCOV2 & INF A&B AMP PRB: CPT

## 2023-10-16 PROCEDURE — 84145 PROCALCITONIN (PCT): CPT

## 2023-10-16 PROCEDURE — 85610 PROTHROMBIN TIME: CPT

## 2023-10-16 PROCEDURE — 85652 RBC SED RATE AUTOMATED: CPT

## 2023-10-16 PROCEDURE — 84484 ASSAY OF TROPONIN QUANT: CPT

## 2023-10-16 PROCEDURE — 36415 COLL VENOUS BLD VENIPUNCTURE: CPT

## 2023-10-16 PROCEDURE — 83605 ASSAY OF LACTIC ACID: CPT

## 2023-10-16 PROCEDURE — 82533 TOTAL CORTISOL: CPT

## 2023-10-16 PROCEDURE — 6360000002 HC RX W HCPCS: Performed by: PHYSICIAN ASSISTANT

## 2023-10-16 PROCEDURE — 99285 EMERGENCY DEPT VISIT HI MDM: CPT

## 2023-10-16 PROCEDURE — 87040 BLOOD CULTURE FOR BACTERIA: CPT

## 2023-10-16 PROCEDURE — 72195 MRI PELVIS W/O DYE: CPT

## 2023-10-16 PROCEDURE — 71046 X-RAY EXAM CHEST 2 VIEWS: CPT

## 2023-10-16 PROCEDURE — 85025 COMPLETE CBC W/AUTO DIFF WBC: CPT

## 2023-10-16 PROCEDURE — 83880 ASSAY OF NATRIURETIC PEPTIDE: CPT

## 2023-10-16 PROCEDURE — 86140 C-REACTIVE PROTEIN: CPT

## 2023-10-16 PROCEDURE — 87086 URINE CULTURE/COLONY COUNT: CPT

## 2023-10-16 PROCEDURE — 96365 THER/PROPH/DIAG IV INF INIT: CPT

## 2023-10-16 PROCEDURE — 2580000003 HC RX 258: Performed by: PHYSICIAN ASSISTANT

## 2023-10-16 PROCEDURE — 93005 ELECTROCARDIOGRAM TRACING: CPT | Performed by: PHYSICIAN ASSISTANT

## 2023-10-16 PROCEDURE — 96367 TX/PROPH/DG ADDL SEQ IV INF: CPT

## 2023-10-16 PROCEDURE — 96366 THER/PROPH/DIAG IV INF ADDON: CPT

## 2023-10-16 PROCEDURE — 82803 BLOOD GASES ANY COMBINATION: CPT

## 2023-10-16 RX ORDER — 0.9 % SODIUM CHLORIDE 0.9 %
30 INTRAVENOUS SOLUTION INTRAVENOUS ONCE
Status: COMPLETED | OUTPATIENT
Start: 2023-10-16 | End: 2023-10-16

## 2023-10-16 RX ADMIN — SODIUM CHLORIDE 2517 ML: 9 INJECTION, SOLUTION INTRAVENOUS at 19:16

## 2023-10-16 RX ADMIN — CEFTRIAXONE SODIUM 1000 MG: 1 INJECTION, POWDER, FOR SOLUTION INTRAMUSCULAR; INTRAVENOUS at 19:39

## 2023-10-16 RX ADMIN — Medication 1500 MG: at 20:35

## 2023-10-16 ASSESSMENT — PAIN DESCRIPTION - DESCRIPTORS: DESCRIPTORS: ACHING

## 2023-10-16 ASSESSMENT — PAIN DESCRIPTION - LOCATION: LOCATION: BACK

## 2023-10-16 ASSESSMENT — PAIN - FUNCTIONAL ASSESSMENT: PAIN_FUNCTIONAL_ASSESSMENT: 0-10

## 2023-10-16 ASSESSMENT — PAIN SCALES - GENERAL: PAINLEVEL_OUTOF10: 7

## 2023-10-17 PROBLEM — I95.9 HYPOTENSION: Status: ACTIVE | Noted: 2023-10-17

## 2023-10-17 PROBLEM — R65.21 SEPTIC SHOCK (HCC): Status: ACTIVE | Noted: 2023-10-17

## 2023-10-17 PROBLEM — N18.9 ACUTE KIDNEY INJURY SUPERIMPOSED ON CKD (HCC): Status: ACTIVE | Noted: 2023-02-18

## 2023-10-17 PROBLEM — J47.0 BRONCHIECTASIS WITH ACUTE LOWER RESPIRATORY INFECTION (HCC): Status: ACTIVE | Noted: 2018-12-19

## 2023-10-17 PROBLEM — E27.2 ACUTE ADRENAL CRISIS (HCC): Status: ACTIVE | Noted: 2023-10-17

## 2023-10-17 PROBLEM — A41.9 SEPTIC SHOCK (HCC): Status: ACTIVE | Noted: 2023-10-17

## 2023-10-17 LAB
ANION GAP SERPL CALCULATED.3IONS-SCNC: 16 MMOL/L (ref 3–16)
BACTERIA UR CULT: NORMAL
BUN SERPL-MCNC: 30 MG/DL (ref 7–20)
CALCIUM SERPL-MCNC: 8.9 MG/DL (ref 8.3–10.6)
CHLORIDE SERPL-SCNC: 95 MMOL/L (ref 99–110)
CO2 SERPL-SCNC: 19 MMOL/L (ref 21–32)
CORTIS SERPL-MCNC: 1.4 UG/DL
CREAT SERPL-MCNC: 1.9 MG/DL (ref 0.6–1.2)
DEPRECATED RDW RBC AUTO: 14.6 % (ref 12.4–15.4)
EKG ATRIAL RATE: 70 BPM
EKG DIAGNOSIS: NORMAL
EKG P AXIS: 69 DEGREES
EKG P-R INTERVAL: 184 MS
EKG Q-T INTERVAL: 434 MS
EKG QRS DURATION: 114 MS
EKG QTC CALCULATION (BAZETT): 468 MS
EKG R AXIS: 12 DEGREES
EKG T AXIS: 55 DEGREES
EKG VENTRICULAR RATE: 70 BPM
GFR SERPLBLD CREATININE-BSD FMLA CKD-EPI: 28 ML/MIN/{1.73_M2}
GLUCOSE BLD-MCNC: 209 MG/DL (ref 70–99)
GLUCOSE BLD-MCNC: 272 MG/DL (ref 70–99)
GLUCOSE BLD-MCNC: 320 MG/DL (ref 70–99)
GLUCOSE BLD-MCNC: 325 MG/DL (ref 70–99)
GLUCOSE BLD-MCNC: 345 MG/DL (ref 70–99)
GLUCOSE SERPL-MCNC: 323 MG/DL (ref 70–99)
HCT VFR BLD AUTO: 28.9 % (ref 36–48)
HGB BLD-MCNC: 9.9 G/DL (ref 12–16)
MCH RBC QN AUTO: 30.1 PG (ref 26–34)
MCHC RBC AUTO-ENTMCNC: 34.1 G/DL (ref 31–36)
MCV RBC AUTO: 88.3 FL (ref 80–100)
PERFORMED ON: ABNORMAL
PLATELET # BLD AUTO: 220 K/UL (ref 135–450)
PMV BLD AUTO: 7.8 FL (ref 5–10.5)
POTASSIUM SERPL-SCNC: 4.2 MMOL/L (ref 3.5–5.1)
RBC # BLD AUTO: 3.28 M/UL (ref 4–5.2)
SODIUM SERPL-SCNC: 130 MMOL/L (ref 136–145)
VANCOMYCIN SERPL-MCNC: 16.9 UG/ML
WBC # BLD AUTO: 6.2 K/UL (ref 4–11)

## 2023-10-17 PROCEDURE — 6370000000 HC RX 637 (ALT 250 FOR IP): Performed by: INTERNAL MEDICINE

## 2023-10-17 PROCEDURE — 2580000003 HC RX 258: Performed by: INTERNAL MEDICINE

## 2023-10-17 PROCEDURE — 99291 CRITICAL CARE FIRST HOUR: CPT | Performed by: INTERNAL MEDICINE

## 2023-10-17 PROCEDURE — 2000000000 HC ICU R&B

## 2023-10-17 PROCEDURE — 6360000002 HC RX W HCPCS: Performed by: INTERNAL MEDICINE

## 2023-10-17 PROCEDURE — 80202 ASSAY OF VANCOMYCIN: CPT

## 2023-10-17 PROCEDURE — 93010 ELECTROCARDIOGRAM REPORT: CPT | Performed by: INTERNAL MEDICINE

## 2023-10-17 PROCEDURE — 2500000003 HC RX 250 WO HCPCS: Performed by: PHYSICIAN ASSISTANT

## 2023-10-17 PROCEDURE — 80048 BASIC METABOLIC PNL TOTAL CA: CPT

## 2023-10-17 PROCEDURE — 99222 1ST HOSP IP/OBS MODERATE 55: CPT | Performed by: INTERNAL MEDICINE

## 2023-10-17 PROCEDURE — 36569 INSJ PICC 5 YR+ W/O IMAGING: CPT

## 2023-10-17 PROCEDURE — 94761 N-INVAS EAR/PLS OXIMETRY MLT: CPT

## 2023-10-17 PROCEDURE — 36415 COLL VENOUS BLD VENIPUNCTURE: CPT

## 2023-10-17 PROCEDURE — 2500000003 HC RX 250 WO HCPCS: Performed by: INTERNAL MEDICINE

## 2023-10-17 PROCEDURE — 94640 AIRWAY INHALATION TREATMENT: CPT

## 2023-10-17 PROCEDURE — 2580000003 HC RX 258: Performed by: PHYSICIAN ASSISTANT

## 2023-10-17 PROCEDURE — 02HV33Z INSERTION OF INFUSION DEVICE INTO SUPERIOR VENA CAVA, PERCUTANEOUS APPROACH: ICD-10-PCS | Performed by: INTERNAL MEDICINE

## 2023-10-17 PROCEDURE — 85027 COMPLETE CBC AUTOMATED: CPT

## 2023-10-17 RX ORDER — SODIUM CHLORIDE, SODIUM LACTATE, POTASSIUM CHLORIDE, CALCIUM CHLORIDE 600; 310; 30; 20 MG/100ML; MG/100ML; MG/100ML; MG/100ML
INJECTION, SOLUTION INTRAVENOUS CONTINUOUS
Status: DISCONTINUED | OUTPATIENT
Start: 2023-10-17 | End: 2023-10-17

## 2023-10-17 RX ORDER — CLOPIDOGREL BISULFATE 75 MG/1
75 TABLET ORAL DAILY
Status: DISCONTINUED | OUTPATIENT
Start: 2023-10-17 | End: 2023-10-19 | Stop reason: HOSPADM

## 2023-10-17 RX ORDER — POLYETHYLENE GLYCOL 3350 17 G/17G
17 POWDER, FOR SOLUTION ORAL DAILY PRN
Status: DISCONTINUED | OUTPATIENT
Start: 2023-10-17 | End: 2023-10-19 | Stop reason: HOSPADM

## 2023-10-17 RX ORDER — INSULIN LISPRO 100 [IU]/ML
0-4 INJECTION, SOLUTION INTRAVENOUS; SUBCUTANEOUS NIGHTLY
Status: DISCONTINUED | OUTPATIENT
Start: 2023-10-17 | End: 2023-10-18

## 2023-10-17 RX ORDER — ATORVASTATIN CALCIUM 40 MG/1
40 TABLET, FILM COATED ORAL DAILY
Status: DISCONTINUED | OUTPATIENT
Start: 2023-10-17 | End: 2023-10-19 | Stop reason: HOSPADM

## 2023-10-17 RX ORDER — SODIUM CHLORIDE 0.9 % (FLUSH) 0.9 %
5-40 SYRINGE (ML) INJECTION PRN
Status: DISCONTINUED | OUTPATIENT
Start: 2023-10-17 | End: 2023-10-18 | Stop reason: SDUPTHER

## 2023-10-17 RX ORDER — SODIUM CHLORIDE 0.9 % (FLUSH) 0.9 %
5-40 SYRINGE (ML) INJECTION EVERY 12 HOURS SCHEDULED
Status: DISCONTINUED | OUTPATIENT
Start: 2023-10-17 | End: 2023-10-19 | Stop reason: HOSPADM

## 2023-10-17 RX ORDER — LEVOFLOXACIN 5 MG/ML
500 INJECTION, SOLUTION INTRAVENOUS ONCE
Status: COMPLETED | OUTPATIENT
Start: 2023-10-17 | End: 2023-10-17

## 2023-10-17 RX ORDER — DIMETHICONE, OXYBENZONE, AND PADIMATE O 2; 2.5; 6.6 G/100G; G/100G; G/100G
STICK TOPICAL PRN
Status: DISCONTINUED | OUTPATIENT
Start: 2023-10-17 | End: 2023-10-19 | Stop reason: HOSPADM

## 2023-10-17 RX ORDER — BENZONATATE 100 MG/1
200 CAPSULE ORAL 3 TIMES DAILY PRN
Status: DISCONTINUED | OUTPATIENT
Start: 2023-10-17 | End: 2023-10-19 | Stop reason: HOSPADM

## 2023-10-17 RX ORDER — ALBUTEROL SULFATE 90 UG/1
2 AEROSOL, METERED RESPIRATORY (INHALATION) EVERY 4 HOURS PRN
Status: DISCONTINUED | OUTPATIENT
Start: 2023-10-17 | End: 2023-10-19 | Stop reason: HOSPADM

## 2023-10-17 RX ORDER — SODIUM CHLORIDE 9 MG/ML
25 INJECTION, SOLUTION INTRAVENOUS PRN
Status: DISCONTINUED | OUTPATIENT
Start: 2023-10-17 | End: 2023-10-18 | Stop reason: SDUPTHER

## 2023-10-17 RX ORDER — ALBUTEROL SULFATE 90 UG/1
2 AEROSOL, METERED RESPIRATORY (INHALATION)
Status: DISCONTINUED | OUTPATIENT
Start: 2023-10-17 | End: 2023-10-17

## 2023-10-17 RX ORDER — ACETAMINOPHEN 650 MG/1
650 SUPPOSITORY RECTAL EVERY 6 HOURS PRN
Status: DISCONTINUED | OUTPATIENT
Start: 2023-10-17 | End: 2023-10-19 | Stop reason: HOSPADM

## 2023-10-17 RX ORDER — ONDANSETRON 2 MG/ML
4 INJECTION INTRAMUSCULAR; INTRAVENOUS EVERY 6 HOURS PRN
Status: DISCONTINUED | OUTPATIENT
Start: 2023-10-17 | End: 2023-10-19 | Stop reason: HOSPADM

## 2023-10-17 RX ORDER — SODIUM CHLORIDE 0.9 % (FLUSH) 0.9 %
5-40 SYRINGE (ML) INJECTION EVERY 12 HOURS SCHEDULED
Status: DISCONTINUED | OUTPATIENT
Start: 2023-10-17 | End: 2023-10-18 | Stop reason: SDUPTHER

## 2023-10-17 RX ORDER — CYCLOBENZAPRINE HCL 10 MG
5 TABLET ORAL 2 TIMES DAILY PRN
Status: DISCONTINUED | OUTPATIENT
Start: 2023-10-17 | End: 2023-10-19 | Stop reason: HOSPADM

## 2023-10-17 RX ORDER — DEXTROSE MONOHYDRATE 100 MG/ML
INJECTION, SOLUTION INTRAVENOUS CONTINUOUS PRN
Status: DISCONTINUED | OUTPATIENT
Start: 2023-10-17 | End: 2023-10-19 | Stop reason: HOSPADM

## 2023-10-17 RX ORDER — SODIUM CHLORIDE 9 MG/ML
INJECTION, SOLUTION INTRAVENOUS PRN
Status: DISCONTINUED | OUTPATIENT
Start: 2023-10-17 | End: 2023-10-19 | Stop reason: HOSPADM

## 2023-10-17 RX ORDER — DOCUSATE SODIUM 100 MG/1
100 CAPSULE, LIQUID FILLED ORAL 2 TIMES DAILY PRN
Status: DISCONTINUED | OUTPATIENT
Start: 2023-10-17 | End: 2023-10-19 | Stop reason: HOSPADM

## 2023-10-17 RX ORDER — LIDOCAINE HYDROCHLORIDE 10 MG/ML
5 INJECTION, SOLUTION INFILTRATION; PERINEURAL ONCE
Status: DISCONTINUED | OUTPATIENT
Start: 2023-10-17 | End: 2023-10-18 | Stop reason: SDUPTHER

## 2023-10-17 RX ORDER — SODIUM CHLORIDE 0.9 % (FLUSH) 0.9 %
5-40 SYRINGE (ML) INJECTION PRN
Status: DISCONTINUED | OUTPATIENT
Start: 2023-10-17 | End: 2023-10-19 | Stop reason: HOSPADM

## 2023-10-17 RX ORDER — ACETAMINOPHEN 325 MG/1
650 TABLET ORAL EVERY 6 HOURS PRN
Status: DISCONTINUED | OUTPATIENT
Start: 2023-10-17 | End: 2023-10-19 | Stop reason: HOSPADM

## 2023-10-17 RX ORDER — ENOXAPARIN SODIUM 100 MG/ML
30 INJECTION SUBCUTANEOUS DAILY
Status: DISCONTINUED | OUTPATIENT
Start: 2023-10-17 | End: 2023-10-17

## 2023-10-17 RX ORDER — DULOXETIN HYDROCHLORIDE 60 MG/1
60 CAPSULE, DELAYED RELEASE ORAL DAILY
Status: DISCONTINUED | OUTPATIENT
Start: 2023-10-17 | End: 2023-10-18

## 2023-10-17 RX ORDER — INSULIN LISPRO 100 [IU]/ML
0-8 INJECTION, SOLUTION INTRAVENOUS; SUBCUTANEOUS
Status: DISCONTINUED | OUTPATIENT
Start: 2023-10-17 | End: 2023-10-18

## 2023-10-17 RX ORDER — MIDODRINE HYDROCHLORIDE 10 MG/1
10 TABLET ORAL 3 TIMES DAILY
Status: DISCONTINUED | OUTPATIENT
Start: 2023-10-17 | End: 2023-10-18

## 2023-10-17 RX ORDER — HEPARIN SODIUM 5000 [USP'U]/ML
5000 INJECTION, SOLUTION INTRAVENOUS; SUBCUTANEOUS EVERY 8 HOURS SCHEDULED
Status: DISCONTINUED | OUTPATIENT
Start: 2023-10-17 | End: 2023-10-19 | Stop reason: HOSPADM

## 2023-10-17 RX ORDER — ONDANSETRON 4 MG/1
4 TABLET, ORALLY DISINTEGRATING ORAL EVERY 8 HOURS PRN
Status: DISCONTINUED | OUTPATIENT
Start: 2023-10-17 | End: 2023-10-19 | Stop reason: HOSPADM

## 2023-10-17 RX ADMIN — MIDODRINE HYDROCHLORIDE 10 MG: 10 TABLET ORAL at 12:19

## 2023-10-17 RX ADMIN — HYDROCORTISONE SODIUM SUCCINATE 100 MG: 100 INJECTION, POWDER, FOR SOLUTION INTRAMUSCULAR; INTRAVENOUS at 11:30

## 2023-10-17 RX ADMIN — SODIUM CHLORIDE 5 MCG/MIN: 9 INJECTION, SOLUTION INTRAVENOUS at 00:33

## 2023-10-17 RX ADMIN — MIDODRINE HYDROCHLORIDE 10 MG: 10 TABLET ORAL at 21:09

## 2023-10-17 RX ADMIN — HYDROCORTISONE SODIUM SUCCINATE 50 MG: 100 INJECTION, POWDER, FOR SOLUTION INTRAMUSCULAR; INTRAVENOUS at 17:12

## 2023-10-17 RX ADMIN — HYDROCORTISONE SODIUM SUCCINATE 50 MG: 100 INJECTION, POWDER, FOR SOLUTION INTRAMUSCULAR; INTRAVENOUS at 22:59

## 2023-10-17 RX ADMIN — INSULIN LISPRO 6 UNITS: 100 INJECTION, SOLUTION INTRAVENOUS; SUBCUTANEOUS at 09:48

## 2023-10-17 RX ADMIN — INSULIN LISPRO 4 UNITS: 100 INJECTION, SOLUTION INTRAVENOUS; SUBCUTANEOUS at 21:05

## 2023-10-17 RX ADMIN — HEPARIN SODIUM 5000 UNITS: 5000 INJECTION INTRAVENOUS; SUBCUTANEOUS at 20:56

## 2023-10-17 RX ADMIN — Medication 2 PUFF: at 23:14

## 2023-10-17 RX ADMIN — ENOXAPARIN SODIUM 30 MG: 100 INJECTION SUBCUTANEOUS at 09:47

## 2023-10-17 RX ADMIN — Medication 10 ML: at 09:47

## 2023-10-17 RX ADMIN — INSULIN LISPRO 4 UNITS: 100 INJECTION, SOLUTION INTRAVENOUS; SUBCUTANEOUS at 12:22

## 2023-10-17 RX ADMIN — ATORVASTATIN CALCIUM 40 MG: 40 TABLET, FILM COATED ORAL at 09:47

## 2023-10-17 RX ADMIN — INSULIN LISPRO 6 UNITS: 100 INJECTION, SOLUTION INTRAVENOUS; SUBCUTANEOUS at 17:06

## 2023-10-17 RX ADMIN — SODIUM BICARBONATE: 84 INJECTION, SOLUTION INTRAVENOUS at 21:11

## 2023-10-17 RX ADMIN — Medication 10 ML: at 20:34

## 2023-10-17 RX ADMIN — SODIUM CHLORIDE, POTASSIUM CHLORIDE, SODIUM LACTATE AND CALCIUM CHLORIDE: 600; 310; 30; 20 INJECTION, SOLUTION INTRAVENOUS at 02:44

## 2023-10-17 RX ADMIN — VANCOMYCIN HYDROCHLORIDE 1250 MG: 10 INJECTION, POWDER, LYOPHILIZED, FOR SOLUTION INTRAVENOUS at 10:35

## 2023-10-17 RX ADMIN — CLOPIDOGREL BISULFATE 75 MG: 75 TABLET ORAL at 09:47

## 2023-10-17 RX ADMIN — NOREPINEPHRINE BITARTRATE 2 MCG/MIN: 1 INJECTION INTRAVENOUS at 15:14

## 2023-10-17 RX ADMIN — CEFEPIME 2000 MG: 2 INJECTION, POWDER, FOR SOLUTION INTRAVENOUS at 02:49

## 2023-10-17 RX ADMIN — SODIUM BICARBONATE: 84 INJECTION, SOLUTION INTRAVENOUS at 11:11

## 2023-10-17 RX ADMIN — DULOXETINE HYDROCHLORIDE 60 MG: 60 CAPSULE, DELAYED RELEASE ORAL at 11:11

## 2023-10-17 RX ADMIN — LEVOFLOXACIN 500 MG: 5 INJECTION, SOLUTION INTRAVENOUS at 15:27

## 2023-10-17 ASSESSMENT — PAIN SCALES - GENERAL
PAINLEVEL_OUTOF10: 9
PAINLEVEL_OUTOF10: 4

## 2023-10-17 ASSESSMENT — PAIN DESCRIPTION - PAIN TYPE: TYPE: CHRONIC PAIN

## 2023-10-17 ASSESSMENT — PAIN DESCRIPTION - FREQUENCY: FREQUENCY: CONTINUOUS

## 2023-10-17 ASSESSMENT — PAIN DESCRIPTION - ONSET: ONSET: ON-GOING

## 2023-10-17 ASSESSMENT — PAIN DESCRIPTION - ORIENTATION: ORIENTATION: LOWER;MID

## 2023-10-17 ASSESSMENT — PAIN DESCRIPTION - LOCATION: LOCATION: BACK;BUTTOCKS

## 2023-10-17 ASSESSMENT — PAIN - FUNCTIONAL ASSESSMENT: PAIN_FUNCTIONAL_ASSESSMENT: PREVENTS OR INTERFERES SOME ACTIVE ACTIVITIES AND ADLS

## 2023-10-17 ASSESSMENT — PAIN DESCRIPTION - DESCRIPTORS: DESCRIPTORS: ACHING

## 2023-10-17 NOTE — CONSULTS
[address] [facility],[address], [address]                                  CONSULTATION    PATIENT NAME: Valorie Martinez                    :        1950  MED REC NO:   [id number]                          ROOM:       5028  ACCOUNT NO:   [de-identified]                           ADMIT DATE: 10/16/2023  PROVIDER:     Jayson Robin MD    CONSULT DATE:  10/17/2023    REASON FOR CONSULTATION:  Acute-on-chronic kidney disease. HISTORY OF PRESENT ILLNESS:  The patient is a 69-year-old   female patient with a past medical history significant for chronic  kidney disease and a baseline creatinine ranging between 1.3 and 1.5  mg/dL. The patient presented to [facility] with a  worsening size of decubitus cellulitis/osteomyelitis. Upon  presentation, she was noted to be hypotensive and had sustained an  acute-on-chronic kidney injury which prompted Nephrology consultation. PAST MEDICAL HISTORY:  1. Chronic kidney disease. 2.  Coronary artery disease. 3.  Diastolic heart failure. 4.  COPD. 5.  Rheumatoid arthritis. 6.  Obstructive sleep apnea. PAST SURGICAL HISTORY:  1. Hernia repair. 2.  EGD. 3.  Bronchoscopy. 4.  Mediastinoscopy. ALLERGIES:  The patient is allergic to ATENOLOL. SOCIAL HISTORY:  The patient quit smoking many years ago, drinks alcohol  occasionally. FAMILY HISTORY:  Significant for coronary artery disease, hypertension,  and diabetes mellitus. REVIEW OF SYSTEMS:  The patient denied any nausea, vomiting or abdominal  pain. Otherwise, a 10-point review of systems was relatively  unremarkable. PHYSICAL EXAMINATION:  VITAL SIGNS:  Blood pressure 122/42, heart rate 93, respirations 21,  temperature 98.7 Fahrenheit. The patient is satting 97% on room air. GENERAL APPEARANCE:  The patient is alert, oriented x3, not in acute  distress.   HEENT:  Eyes revealed normal conjunctivae, reactive

## 2023-10-17 NOTE — H&P
foramen are grossly unremarkable. Probable remote compression deformity of L5. Mild to moderate degenerative changes in the lower lumbar/lumbosacral spine. No significant marrow edema in the sacral ala or iliac bones. JOINTS: Bilateral SI joints appear patent. No SI joint effusion. No SI joint erosive changes. 1. Ulceration and gas containing sinus tract superficial to the lower coccyx with subjacent suspected early osteomyelitis of the mid coccyx. 2. Moderate phlegmonous changes/cellulitis along the posterior midline lower back. 3. Probable remote compression deformity of L5. Mild-to-moderate degenerative changes in the lower lumbar/lumbosacral spine. 4. No MR evidence for sacroiliitis. SI joints appear patent. The findings were sent to the Radiology Results [address] at 7:50 pm on 10/16/2023 to be communicated to a licensed caregiver. XR SACRUM COCCYX (MIN 2 VIEWS)    Result Date: 10/10/2023  EXAMINATION: THREE XRAY VIEWS OF THE SACRUM/COCCYX 10/10/2023 4:28 pm COMPARISON: None. HISTORY: ORDERING SYSTEM PROVIDED HISTORY: increased pain of chronic wound TECHNOLOGIST PROVIDED HISTORY: Reason for exam:->increased pain of chronic wound Reason for Exam: increased pain of chronic wound FINDINGS: Provided history of chronic wound with specifics not indicated in the available history. There is a probable decubitus ulcer superficial to the coccyx with ill-defined subcutaneous gas superficial to the coccyx. No definite erosions. Prior kyphoplasty in the visualized lumbar spine at least 3 levels. No other significant finding. Poor characterization of a suspected sacral decubitus ulcer. No radiographic evidence of osteomyelitis. Consider cross-sectional imaging for more sensitive evaluation if indicated clinically.          Electronically signed by Aparna Georges MD on 10/17/2023 at 1:09 AM

## 2023-10-17 NOTE — PLAN OF CARE
Problem: Safety - Adult  Goal: Free from fall injury  Outcome: Progressing     Problem: Pain  Goal: Verbalizes/displays adequate comfort level or baseline comfort level  Outcome: Progressing     Problem: Cognitive:  Goal: Knowledge of wound care  Description: Knowledge of wound care  Outcome: Progressing  Goal: Understands risk factors for wounds  Description: Understands risk factors for wounds  Outcome: Progressing     Problem: Wound:  Goal: Will show signs of wound healing; wound closure and no evidence of infection  Description: Will show signs of wound healing; wound closure and no evidence of infection  Outcome: Progressing     Problem: Pressure Ulcer:  Goal: Signs of wound healing will improve  Description: Signs of wound healing will improve  Outcome: Progressing  Goal: Absence of new pressure ulcer  Description: Absence of new pressure ulcer  Outcome: Progressing  Goal: Will show no infection signs and symptoms  Description: Will show no infection signs and symptoms  Outcome: Progressing     Problem: Arterial:  Goal: Optimize blood flow for wound healing  Description: Optimize blood flow for wound healing  Outcome: Progressing     Problem: Venous:  Goal: Signs of wound healing will improve  Description: Signs of wound healing will improve  Outcome: Progressing     Problem: Smoking cessation:  Goal: Ability to formulate a plan to maintain a tobacco-free life will be supported  Description: Ability to formulate a plan to maintain a tobacco-free life will be supported  Outcome: Progressing     Problem: Compression therapy:  Goal: Will be free from complications associated with compression therapy  Description: Will be free from complications associated with compression therapy  Outcome: Progressing     Problem: Weight control:  Goal: Ability to maintain an optimal weight for height and age will be supported  Description: Ability to maintain an optimal weight for height and age will be supported  Outcome:

## 2023-10-17 NOTE — ED NOTES
To start Levophed on pt's Peripheral line as ordered by the hospitalist.     Ezequiel Tomas RN  10/17/23 0443

## 2023-10-17 NOTE — CONSULTS
Patient seen and examined, consult note dictated. Assessment and Plan:    1- A/CKD: The patient has chronic kidney disease with a baseline creatinine of 1.3 to 1.5 mg/dl. Her ISI is likely secondary to a non oliguric ATN in the setting of hypotension and renal hypoperfusion. Her urinary output is well maintained and her serum creatinine is slowly trending down. - Continue volume expansion.  - Avoid all nephrotoxic agents at this time. - Maintain mean arterial pressure > 65 mmHg. 2- Hyponatremia: Improving with volume expansion. 3- Hypotension: Blood pressure improved with IV pressors.   4- Septic shock : Currently on IV antibiotics per primary team.

## 2023-10-17 NOTE — ED NOTES
Pt's sugar dropped down to 69 as per family; pt is on dexcom.       Rubén Rodriguez, [address]  10/16/23 8940

## 2023-10-17 NOTE — CONSULTS
Reason for referral and CC: Hypotension    HISTORY OF PRESENT ILLNESS: 69 yo female with a h/o rheumatoid arthritis on chronic prednisone, bronchiectasis, COPD,  Bronchoscopy several days ago growing serratia, presented with c/o hypotension and confusion. Breathing and cough close to baseline. She is fatigued. Less confused today. Moderate amount of thick secretions observed on bronchoscopy last week.     Past Medical History:   Diagnosis Date    Acute on chronic diastolic heart failure due to coronary artery disease ([facility])     Acute respiratory failure with hypoxia ([facility])     Atherosclerosis of native artery of right lower extremity with rest pain ([address]) 07/25/2017    Back pain     Branch retinal vein occlusion 07/20/2012    Bronchiectasis with acute exacerbation ([address])     Candidal dermatitis 05/01/2023    Cellulitis and abscess of left leg 07/11/2021    Cellulitis of left lower extremity 07/11/2021    S/P vein harvest 05/2021 for CABG    CHF (congestive heart failure) ([facility])     Closed compression fracture of thoracic vertebra ([address]) 01/15/2020    Closed fracture of facial bone with routine healing 11/21/2016    Closed jaw fracture ([address]) 01/15/2020    Community acquired pneumonia of left lower lobe of lung     Compression fracture of L1 lumbar vertebra ([address]) 01/15/2020    COPD (chronic obstructive pulmonary disease) ([facility])     COPD exacerbation ([address])     COVID     Diabetes mellitus ([address])     Fracture of tibial plateau, closed, left, initial encounter 12/05/2017    HCAP (healthcare-associated pneumonia)     Minimally displaced zone I fracture of sacrum ([address]) 09/02/2020    MRSA (methicillin resistant staph aureus) culture positive 07/2021    MRSA (methicillin resistant Staphylococcus aureus) 07/13/2021    wound    Mucus plugging of bronchi     NSTEMI (non-ST elevated myocardial infarction) ([address]) 04/23/2021    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

## 2023-10-17 NOTE — CONSULTS
[facility] Infectious Disease Consult Note      Brett Taveras     : 1950    DATE OF VISIT:  10/17/2023  DATE OF ADMISSION:  10/16/2023       Subjective:     Myriam Duncan is a 68 y.o. female whom I've been asked to see by Dr. Jose Harris for septic shock. Chief Complaint   Patient presents with    Altered Mental Status     Family states confusion and lethargy over the last 2 days and is progressively getting worse. Family states pt does have strong smelling urine and decrease in fluid intake       HPI:  Ms. Ok James is a patient whom I know pretty well from the wound care center, primarily for a small but stagnant stage 4 sacral pressure ulcer. Despite various attempts at local wound care, without doing anything too aggressive (per her wishes), she's had nonhealing of that wound for quite some time now, and the decision was made last month to just focus on palliation at this point. The biggest recent obstacle to healing recently has been a good deal of wound undermining and some epibole. She did have a period a month or two ago when drainage really picked up, with a bit of discoloration and malodor; that might have settled down somewhat with a course of oral Abx, and she's never had grossly exposed bone within the ulcer. Last week however, her wound pain increased pretty significantly, without any trauma and without any major change in the wound appearance; if anything the skin opening was a bit smaller, undermining maybe a bit more extensive, drainage near her baseline, no fever. Some basic labs were pretty unremarkable, as was a plain XR. No major changes on wound exam last week apart from some increased tenderness, but I ordered an MRI to look for osteomyelitis, which she was willing to deal with surgically, given the increase in pain. That was just done yesterday.     Friday she had a scheduled follow-up bronchoscopy, as she has COPD, bronchiectasis, has had a number of courses of Abx

## 2023-10-17 NOTE — ED PROVIDER NOTES
I saw this patient in conjunction with the advanced practice provider. Additionally, I took independent history and physical.  I agree with the history, assessment, and plan as documented in the chart except as otherwise noted. Nursing notes reviewed and I agree except as otherwise noted. Patient has not been acting like yourself for the past 2 days according to family. They have been seeing a doctor for her sacral wound which on exam does not have significant induration present but packing material is in place. MRI showed osteomyelitis of the sacrum. Patient admitted to the hospital.    EKG on my interpretation shows normal sinus rhythm at 70. SD interval 184. . . QTc 468. Incomplete left bundle branch block is similar to the prior EKG from August 5, 2023 without significant changes.       Labs Reviewed   CBC WITH AUTO DIFFERENTIAL - Abnormal; Notable for the following components:       Result Value    RBC 3.15 (*)     Hemoglobin 9.7 (*)     Hematocrit 28.1 (*)     Lymphocytes Absolute 0.9 (*)     All other components within normal limits   COMPREHENSIVE METABOLIC PANEL W/ REFLEX TO MG FOR LOW K - Abnormal; Notable for the following components:    Sodium 127 (*)     Chloride 88 (*)     Glucose 148 (*)     BUN 32 (*)     Creatinine 2.6 (*)     Est, Glom Filt Rate 19 (*)     All other components within normal limits   BLOOD GAS, VENOUS - Abnormal; Notable for the following components:    pH, Serafin 7.493 (*)     pCO2, Serafin 28.7 (*)     pO2, Serafin 90.9 (*)     HCO3, Venous 21.5 (*)     Carboxyhemoglobin 4.5 (*)     All other components within normal limits   TROPONIN - Abnormal; Notable for the following components:    Troponin, High Sensitivity 64 (*)     All other components within normal limits   BRAIN NATRIURETIC PEPTIDE - Abnormal; Notable for the following components:    Pro-BNP 1,322 (*)     All other components within normal limits   URINALYSIS WITH REFLEX TO CULTURE - Abnormal; Notable for
leukocytosis she has a normal lactic, she is afebrile she is not tachycardic. Blood pressure does improve with IV fluid bolus. CBC shows chronic appearing anemia not significantly changed from prior. Metabolic panel shows ISI on CKD. Hyponatremia appreciated, IV fluid bolus adjusted rate decreased. I suspect that she is just globally volume depleted. High sensitive troponin is elevated I believe this is likely secondary to the decreased clearance she has no chest pain. Delta troponin pending. EKG does not show any acute STEMI pattern. CT head without contrast is negative for acute intracranial abnormality, chest x-ray shows no sign of pneumonia she has small pleural effusions. Blood cultures were obtained, COVID and flu are negative. Sed rate and CRP are elevated consistent with osteomyelitis. BNP is mildly elevated however downtrending for her. At this time I recommend the patient be admitted for ISI on CKD osteomyelitis of the sacrum. Disposition Considerations (include 1 Tests not done, Shared Decision Making, Pt Expectation of Test or Tx.): All information including ED workup, results, treatment, diagnosis has been reviewed and discussed with ED attending physician and directly discussed with Hospitalist who is the admitting physician. Pt will be admitted in stable condition. Pt advised of admission and is in full agreement. I am the Primary Clinician of Record. FINAL IMPRESSION      1. Osteomyelitis of other site, unspecified type ([address])    2. Hypotension, unspecified hypotension type    3. Acute kidney injury superimposed on CKD [facility])          DISPOSITION/PLAN     DISPOSITION Decision To Admit 10/16/2023 09:05:39 PM      PATIENT REFERRED TO:  No follow-up provider specified.     DISCHARGE MEDICATIONS:  New Prescriptions    No medications on file       DISCONTINUED MEDICATIONS:  Discontinued Medications    DOXYCYCLINE HYCLATE (VIBRA-TABS) 100 MG TABLET    Take 1 tablet by mouth 2

## 2023-10-17 NOTE — CONSULTS
Pharmacy Note  Vancomycin Consult    Wayne Hunt is a 68 y.o. female started on Vancomycin for SSTI (7 days); consult received from Dr. Magy Alcantar to manage therapy. Also receiving the following antibiotics: cefepime. Recent Labs     10/16/23  1855   BUN 32*   CREATININE 2.6*   WBC 6.0       Estimated Creatinine Clearance: 22 mL/min (A) (based on SCr of 2.6 mg/dL (H)). Goal Trough Level: 15-20 mcg/mL  Goal AUC: 400-600 mg/L    Assessment/Plan:  Will initiate Vancomycin with a one time loading dose of 1500 mg x1, followed by pulse dosing. Daily random levels ordered. Thank you for the consult. Will continue to follow.     Kumar Shell, PharmD, [facility], 10/17/2023 1:23 AM

## 2023-10-18 ENCOUNTER — APPOINTMENT (OUTPATIENT)
Dept: GENERAL RADIOLOGY | Age: 73
DRG: 871 | End: 2023-10-18
Payer: MEDICARE

## 2023-10-18 PROBLEM — E11.9 DIABETES MELLITUS (HCC): Status: ACTIVE | Noted: 2017-10-19

## 2023-10-18 PROBLEM — R11.0 NAUSEA: Status: ACTIVE | Noted: 2023-10-18

## 2023-10-18 PROBLEM — G93.41 ACUTE METABOLIC ENCEPHALOPATHY: Status: ACTIVE | Noted: 2023-10-18

## 2023-10-18 LAB
ANION GAP SERPL CALCULATED.3IONS-SCNC: 15 MMOL/L (ref 3–16)
ANION GAP SERPL CALCULATED.3IONS-SCNC: 15 MMOL/L (ref 3–16)
ANION GAP SERPL CALCULATED.3IONS-SCNC: 18 MMOL/L (ref 3–16)
BASE EXCESS BLDV CALC-SCNC: -1.6 MMOL/L (ref -3–3)
BASOPHILS # BLD: 0 K/UL (ref 0–0.2)
BASOPHILS # BLD: 0 K/UL (ref 0–0.2)
BASOPHILS NFR BLD: 0.3 %
BASOPHILS NFR BLD: 0.3 %
BUN SERPL-MCNC: 18 MG/DL (ref 7–20)
BUN SERPL-MCNC: 19 MG/DL (ref 7–20)
BUN SERPL-MCNC: 20 MG/DL (ref 7–20)
CALCIUM SERPL-MCNC: 8.5 MG/DL (ref 8.3–10.6)
CALCIUM SERPL-MCNC: 9.5 MG/DL (ref 8.3–10.6)
CALCIUM SERPL-MCNC: 9.6 MG/DL (ref 8.3–10.6)
CHLORIDE SERPL-SCNC: 97 MMOL/L (ref 99–110)
CHLORIDE SERPL-SCNC: 98 MMOL/L (ref 99–110)
CHLORIDE SERPL-SCNC: 99 MMOL/L (ref 99–110)
CO2 BLDV-SCNC: 25 MMOL/L
CO2 SERPL-SCNC: 21 MMOL/L (ref 21–32)
CO2 SERPL-SCNC: 22 MMOL/L (ref 21–32)
CO2 SERPL-SCNC: 24 MMOL/L (ref 21–32)
COHGB MFR BLDV: 1 % (ref 0–1.5)
CREAT SERPL-MCNC: 0.9 MG/DL (ref 0.6–1.2)
CREAT SERPL-MCNC: 1 MG/DL (ref 0.6–1.2)
CREAT SERPL-MCNC: 1.1 MG/DL (ref 0.6–1.2)
DEPRECATED RDW RBC AUTO: 14.6 % (ref 12.4–15.4)
DEPRECATED RDW RBC AUTO: 14.7 % (ref 12.4–15.4)
EOSINOPHIL # BLD: 0 K/UL (ref 0–0.6)
EOSINOPHIL # BLD: 0 K/UL (ref 0–0.6)
EOSINOPHIL NFR BLD: 0 %
EOSINOPHIL NFR BLD: 0 %
GFR SERPLBLD CREATININE-BSD FMLA CKD-EPI: 53 ML/MIN/{1.73_M2}
GFR SERPLBLD CREATININE-BSD FMLA CKD-EPI: 60 ML/MIN/{1.73_M2}
GFR SERPLBLD CREATININE-BSD FMLA CKD-EPI: >60 ML/MIN/{1.73_M2}
GLUCOSE BLD-MCNC: 178 MG/DL (ref 70–99)
GLUCOSE BLD-MCNC: 271 MG/DL (ref 70–99)
GLUCOSE BLD-MCNC: 284 MG/DL (ref 70–99)
GLUCOSE BLD-MCNC: 288 MG/DL (ref 70–99)
GLUCOSE BLD-MCNC: 339 MG/DL (ref 70–99)
GLUCOSE BLD-MCNC: 400 MG/DL (ref 70–99)
GLUCOSE BLD-MCNC: 408 MG/DL (ref 70–99)
GLUCOSE SERPL-MCNC: 235 MG/DL (ref 70–99)
GLUCOSE SERPL-MCNC: 245 MG/DL (ref 70–99)
GLUCOSE SERPL-MCNC: 348 MG/DL (ref 70–99)
HCO3 BLDV-SCNC: 23.7 MMOL/L (ref 23–29)
HCT VFR BLD AUTO: 28.2 % (ref 36–48)
HCT VFR BLD AUTO: 28.7 % (ref 36–48)
HGB BLD-MCNC: 9.6 G/DL (ref 12–16)
HGB BLD-MCNC: 9.8 G/DL (ref 12–16)
LYMPHOCYTES # BLD: 0.4 K/UL (ref 1–5.1)
LYMPHOCYTES # BLD: 0.6 K/UL (ref 1–5.1)
LYMPHOCYTES NFR BLD: 10.1 %
LYMPHOCYTES NFR BLD: 4.8 %
MCH RBC QN AUTO: 29.9 PG (ref 26–34)
MCH RBC QN AUTO: 30.1 PG (ref 26–34)
MCHC RBC AUTO-ENTMCNC: 33.6 G/DL (ref 31–36)
MCHC RBC AUTO-ENTMCNC: 34.6 G/DL (ref 31–36)
MCV RBC AUTO: 86.9 FL (ref 80–100)
MCV RBC AUTO: 88.8 FL (ref 80–100)
METHGB MFR BLDV: 0.3 %
MONOCYTES # BLD: 0.2 K/UL (ref 0–1.3)
MONOCYTES # BLD: 0.4 K/UL (ref 0–1.3)
MONOCYTES NFR BLD: 3 %
MONOCYTES NFR BLD: 5.1 %
NEUTROPHILS # BLD: 4.8 K/UL (ref 1.7–7.7)
NEUTROPHILS # BLD: 7.3 K/UL (ref 1.7–7.7)
NEUTROPHILS NFR BLD: 86.6 %
NEUTROPHILS NFR BLD: 89.8 %
O2 THERAPY: ABNORMAL
PCO2 BLDV: 42.5 MMHG (ref 40–50)
PERFORMED ON: ABNORMAL
PH BLDV: 7.36 [PH] (ref 7.35–7.45)
PLATELET # BLD AUTO: 232 K/UL (ref 135–450)
PLATELET # BLD AUTO: 240 K/UL (ref 135–450)
PMV BLD AUTO: 7.8 FL (ref 5–10.5)
PMV BLD AUTO: 8 FL (ref 5–10.5)
PO2 BLDV: 168.2 MMHG (ref 25–40)
POTASSIUM SERPL-SCNC: 3.5 MMOL/L (ref 3.5–5.1)
POTASSIUM SERPL-SCNC: 3.8 MMOL/L (ref 3.5–5.1)
POTASSIUM SERPL-SCNC: 3.9 MMOL/L (ref 3.5–5.1)
PROCALCITONIN SERPL IA-MCNC: 0.12 NG/ML (ref 0–0.15)
RBC # BLD AUTO: 3.23 M/UL (ref 4–5.2)
RBC # BLD AUTO: 3.25 M/UL (ref 4–5.2)
SAO2 % BLDV: 99 %
SODIUM SERPL-SCNC: 135 MMOL/L (ref 136–145)
SODIUM SERPL-SCNC: 137 MMOL/L (ref 136–145)
SODIUM SERPL-SCNC: 137 MMOL/L (ref 136–145)
TROPONIN, HIGH SENSITIVITY: 404 NG/L (ref 0–14)
WBC # BLD AUTO: 5.5 K/UL (ref 4–11)
WBC # BLD AUTO: 8.2 K/UL (ref 4–11)

## 2023-10-18 PROCEDURE — 80048 BASIC METABOLIC PNL TOTAL CA: CPT

## 2023-10-18 PROCEDURE — 82803 BLOOD GASES ANY COMBINATION: CPT

## 2023-10-18 PROCEDURE — 93005 ELECTROCARDIOGRAM TRACING: CPT | Performed by: INTERNAL MEDICINE

## 2023-10-18 PROCEDURE — 6360000002 HC RX W HCPCS: Performed by: INTERNAL MEDICINE

## 2023-10-18 PROCEDURE — 6370000000 HC RX 637 (ALT 250 FOR IP): Performed by: INTERNAL MEDICINE

## 2023-10-18 PROCEDURE — 99233 SBSQ HOSP IP/OBS HIGH 50: CPT | Performed by: INTERNAL MEDICINE

## 2023-10-18 PROCEDURE — 2700000000 HC OXYGEN THERAPY PER DAY

## 2023-10-18 PROCEDURE — 94640 AIRWAY INHALATION TREATMENT: CPT

## 2023-10-18 PROCEDURE — 99231 SBSQ HOSP IP/OBS SF/LOW 25: CPT | Performed by: INTERNAL MEDICINE

## 2023-10-18 PROCEDURE — 36415 COLL VENOUS BLD VENIPUNCTURE: CPT

## 2023-10-18 PROCEDURE — 71045 X-RAY EXAM CHEST 1 VIEW: CPT

## 2023-10-18 PROCEDURE — 2000000000 HC ICU R&B

## 2023-10-18 PROCEDURE — 84484 ASSAY OF TROPONIN QUANT: CPT

## 2023-10-18 PROCEDURE — 94761 N-INVAS EAR/PLS OXIMETRY MLT: CPT

## 2023-10-18 PROCEDURE — 85025 COMPLETE CBC W/AUTO DIFF WBC: CPT

## 2023-10-18 PROCEDURE — 84145 PROCALCITONIN (PCT): CPT

## 2023-10-18 RX ORDER — HEPARIN SODIUM 1000 [USP'U]/ML
2000 INJECTION, SOLUTION INTRAVENOUS; SUBCUTANEOUS PRN
Status: DISCONTINUED | OUTPATIENT
Start: 2023-10-18 | End: 2023-10-19 | Stop reason: HOSPADM

## 2023-10-18 RX ORDER — PROCHLORPERAZINE EDISYLATE 5 MG/ML
10 INJECTION INTRAMUSCULAR; INTRAVENOUS EVERY 6 HOURS PRN
Status: DISCONTINUED | OUTPATIENT
Start: 2023-10-18 | End: 2023-10-19 | Stop reason: HOSPADM

## 2023-10-18 RX ORDER — TORSEMIDE 20 MG/1
20 TABLET ORAL DAILY
Status: CANCELLED | OUTPATIENT
Start: 2023-10-18

## 2023-10-18 RX ORDER — INSULIN GLARGINE 100 [IU]/ML
15 INJECTION, SOLUTION SUBCUTANEOUS 2 TIMES DAILY
Status: DISCONTINUED | OUTPATIENT
Start: 2023-10-18 | End: 2023-10-18

## 2023-10-18 RX ORDER — DULOXETIN HYDROCHLORIDE 60 MG/1
60 CAPSULE, DELAYED RELEASE ORAL 2 TIMES DAILY
Status: DISCONTINUED | OUTPATIENT
Start: 2023-10-18 | End: 2023-10-19 | Stop reason: HOSPADM

## 2023-10-18 RX ORDER — PANTOPRAZOLE SODIUM 40 MG/1
40 TABLET, DELAYED RELEASE ORAL
Status: DISCONTINUED | OUTPATIENT
Start: 2023-10-18 | End: 2023-10-19 | Stop reason: HOSPADM

## 2023-10-18 RX ORDER — MIDODRINE HYDROCHLORIDE 5 MG/1
5 TABLET ORAL ONCE
Status: DISCONTINUED | OUTPATIENT
Start: 2023-10-18 | End: 2023-10-18

## 2023-10-18 RX ORDER — IPRATROPIUM BROMIDE AND ALBUTEROL SULFATE 2.5; .5 MG/3ML; MG/3ML
1 SOLUTION RESPIRATORY (INHALATION) EVERY 4 HOURS PRN
Status: DISCONTINUED | OUTPATIENT
Start: 2023-10-18 | End: 2023-10-19 | Stop reason: HOSPADM

## 2023-10-18 RX ORDER — ROFLUMILAST 500 UG/1
500 TABLET ORAL DAILY
Status: DISCONTINUED | OUTPATIENT
Start: 2023-10-18 | End: 2023-10-19 | Stop reason: HOSPADM

## 2023-10-18 RX ORDER — METOPROLOL SUCCINATE 25 MG/1
12.5 TABLET, EXTENDED RELEASE ORAL DAILY
Status: CANCELLED | OUTPATIENT
Start: 2023-10-18

## 2023-10-18 RX ORDER — CETIRIZINE HYDROCHLORIDE 10 MG/1
10 TABLET ORAL DAILY
Status: DISCONTINUED | OUTPATIENT
Start: 2023-10-18 | End: 2023-10-19 | Stop reason: HOSPADM

## 2023-10-18 RX ORDER — INSULIN LISPRO 100 [IU]/ML
0-16 INJECTION, SOLUTION INTRAVENOUS; SUBCUTANEOUS
Status: DISCONTINUED | OUTPATIENT
Start: 2023-10-18 | End: 2023-10-19 | Stop reason: HOSPADM

## 2023-10-18 RX ORDER — ASPIRIN 81 MG/1
162 TABLET ORAL ONCE
Status: COMPLETED | OUTPATIENT
Start: 2023-10-19 | End: 2023-10-19

## 2023-10-18 RX ORDER — IPRATROPIUM BROMIDE AND ALBUTEROL SULFATE 2.5; .5 MG/3ML; MG/3ML
1 SOLUTION RESPIRATORY (INHALATION)
Status: DISCONTINUED | OUTPATIENT
Start: 2023-10-18 | End: 2023-10-19 | Stop reason: HOSPADM

## 2023-10-18 RX ORDER — PROMETHAZINE HYDROCHLORIDE 25 MG/ML
6.25 INJECTION, SOLUTION INTRAMUSCULAR; INTRAVENOUS EVERY 6 HOURS PRN
Status: DISCONTINUED | OUTPATIENT
Start: 2023-10-18 | End: 2023-10-19 | Stop reason: HOSPADM

## 2023-10-18 RX ORDER — ONDANSETRON 4 MG/1
4 TABLET, ORALLY DISINTEGRATING ORAL ONCE
Status: COMPLETED | OUTPATIENT
Start: 2023-10-18 | End: 2023-10-18

## 2023-10-18 RX ORDER — INSULIN LISPRO 100 [IU]/ML
0-4 INJECTION, SOLUTION INTRAVENOUS; SUBCUTANEOUS NIGHTLY
Status: DISCONTINUED | OUTPATIENT
Start: 2023-10-18 | End: 2023-10-19 | Stop reason: HOSPADM

## 2023-10-18 RX ORDER — MIDODRINE HYDROCHLORIDE 5 MG/1
5 TABLET ORAL 3 TIMES DAILY
Status: DISCONTINUED | OUTPATIENT
Start: 2023-10-18 | End: 2023-10-18

## 2023-10-18 RX ORDER — INSULIN LISPRO 100 [IU]/ML
8 INJECTION, SOLUTION INTRAVENOUS; SUBCUTANEOUS ONCE
Status: CANCELLED | OUTPATIENT
Start: 2023-10-18

## 2023-10-18 RX ORDER — LEVOFLOXACIN 500 MG/1
500 TABLET, FILM COATED ORAL DAILY
Status: DISCONTINUED | OUTPATIENT
Start: 2023-10-18 | End: 2023-10-19 | Stop reason: HOSPADM

## 2023-10-18 RX ORDER — HEPARIN SODIUM 1000 [USP'U]/ML
4000 INJECTION, SOLUTION INTRAVENOUS; SUBCUTANEOUS PRN
Status: DISCONTINUED | OUTPATIENT
Start: 2023-10-18 | End: 2023-10-19 | Stop reason: HOSPADM

## 2023-10-18 RX ORDER — INSULIN GLARGINE 100 [IU]/ML
12 INJECTION, SOLUTION SUBCUTANEOUS 2 TIMES DAILY
Status: DISCONTINUED | OUTPATIENT
Start: 2023-10-18 | End: 2023-10-18

## 2023-10-18 RX ORDER — AZITHROMYCIN 250 MG/1
250 TABLET, FILM COATED ORAL DAILY
Status: CANCELLED | OUTPATIENT
Start: 2023-10-18

## 2023-10-18 RX ORDER — HEPARIN SODIUM 10000 [USP'U]/100ML
11.8 INJECTION, SOLUTION INTRAVENOUS CONTINUOUS
Status: DISCONTINUED | OUTPATIENT
Start: 2023-10-19 | End: 2023-10-19 | Stop reason: HOSPADM

## 2023-10-18 RX ORDER — MORPHINE SULFATE 15 MG/1
15 TABLET, FILM COATED, EXTENDED RELEASE ORAL EVERY 12 HOURS SCHEDULED
Status: DISCONTINUED | OUTPATIENT
Start: 2023-10-18 | End: 2023-10-19 | Stop reason: HOSPADM

## 2023-10-18 RX ORDER — CALCIUM CARBONATE 500 MG/1
500 TABLET, CHEWABLE ORAL 3 TIMES DAILY PRN
Status: DISCONTINUED | OUTPATIENT
Start: 2023-10-18 | End: 2023-10-19 | Stop reason: HOSPADM

## 2023-10-18 RX ORDER — GABAPENTIN 100 MG/1
100 CAPSULE ORAL 2 TIMES DAILY
Status: DISCONTINUED | OUTPATIENT
Start: 2023-10-18 | End: 2023-10-19 | Stop reason: HOSPADM

## 2023-10-18 RX ORDER — HEPARIN SODIUM 1000 [USP'U]/ML
4000 INJECTION, SOLUTION INTRAVENOUS; SUBCUTANEOUS ONCE
Status: COMPLETED | OUTPATIENT
Start: 2023-10-19 | End: 2023-10-19

## 2023-10-18 RX ORDER — SPIRONOLACTONE 25 MG/1
25 TABLET ORAL DAILY
Status: CANCELLED | OUTPATIENT
Start: 2023-10-18

## 2023-10-18 RX ORDER — OXYCODONE AND ACETAMINOPHEN 10; 325 MG/1; MG/1
1 TABLET ORAL DAILY
Status: CANCELLED | OUTPATIENT
Start: 2023-10-18

## 2023-10-18 RX ORDER — INSULIN GLARGINE 100 [IU]/ML
30 INJECTION, SOLUTION SUBCUTANEOUS 2 TIMES DAILY
Status: DISCONTINUED | OUTPATIENT
Start: 2023-10-18 | End: 2023-10-19 | Stop reason: HOSPADM

## 2023-10-18 RX ADMIN — LEVOFLOXACIN 500 MG: 500 TABLET, FILM COATED ORAL at 10:52

## 2023-10-18 RX ADMIN — CALCIUM CARBONATE 500 MG: 500 TABLET, CHEWABLE ORAL at 10:49

## 2023-10-18 RX ADMIN — MORPHINE SULFATE 15 MG: 15 TABLET, FILM COATED, EXTENDED RELEASE ORAL at 10:52

## 2023-10-18 RX ADMIN — ATORVASTATIN CALCIUM 40 MG: 40 TABLET, FILM COATED ORAL at 10:52

## 2023-10-18 RX ADMIN — DULOXETINE HYDROCHLORIDE 60 MG: 60 CAPSULE, DELAYED RELEASE ORAL at 21:22

## 2023-10-18 RX ADMIN — MUPIROCIN: 20 OINTMENT TOPICAL at 21:23

## 2023-10-18 RX ADMIN — INSULIN LISPRO 8 UNITS: 100 INJECTION, SOLUTION INTRAVENOUS; SUBCUTANEOUS at 11:47

## 2023-10-18 RX ADMIN — HYDROCORTISONE SODIUM SUCCINATE 50 MG: 100 INJECTION, POWDER, FOR SOLUTION INTRAMUSCULAR; INTRAVENOUS at 18:19

## 2023-10-18 RX ADMIN — MORPHINE SULFATE 15 MG: 15 TABLET, FILM COATED, EXTENDED RELEASE ORAL at 21:22

## 2023-10-18 RX ADMIN — ROFLUMILAST 500 MCG: 500 TABLET ORAL at 10:52

## 2023-10-18 RX ADMIN — INSULIN GLARGINE 30 UNITS: 100 INJECTION, SOLUTION SUBCUTANEOUS at 21:21

## 2023-10-18 RX ADMIN — SACUBITRIL AND VALSARTAN 0.5 TABLET: 24; 26 TABLET, FILM COATED ORAL at 10:52

## 2023-10-18 RX ADMIN — HYDROCORTISONE SODIUM SUCCINATE 50 MG: 100 INJECTION, POWDER, FOR SOLUTION INTRAMUSCULAR; INTRAVENOUS at 05:05

## 2023-10-18 RX ADMIN — CLOPIDOGREL BISULFATE 75 MG: 75 TABLET ORAL at 10:52

## 2023-10-18 RX ADMIN — DULOXETINE HYDROCHLORIDE 60 MG: 60 CAPSULE, DELAYED RELEASE ORAL at 10:52

## 2023-10-18 RX ADMIN — SACUBITRIL AND VALSARTAN 0.5 TABLET: 24; 26 TABLET, FILM COATED ORAL at 21:22

## 2023-10-18 RX ADMIN — HEPARIN SODIUM 5000 UNITS: 5000 INJECTION INTRAVENOUS; SUBCUTANEOUS at 16:00

## 2023-10-18 RX ADMIN — Medication 2 PUFF: at 22:38

## 2023-10-18 RX ADMIN — IPRATROPIUM BROMIDE AND ALBUTEROL SULFATE 1 DOSE: .5; 2.5 SOLUTION RESPIRATORY (INHALATION) at 23:36

## 2023-10-18 RX ADMIN — HEPARIN SODIUM 5000 UNITS: 5000 INJECTION INTRAVENOUS; SUBCUTANEOUS at 21:22

## 2023-10-18 RX ADMIN — ONDANSETRON 4 MG: 2 INJECTION INTRAMUSCULAR; INTRAVENOUS at 11:04

## 2023-10-18 RX ADMIN — INSULIN LISPRO 8 UNITS: 100 INJECTION, SOLUTION INTRAVENOUS; SUBCUTANEOUS at 16:44

## 2023-10-18 RX ADMIN — INSULIN LISPRO 6 UNITS: 100 INJECTION, SOLUTION INTRAVENOUS; SUBCUTANEOUS at 10:58

## 2023-10-18 RX ADMIN — INSULIN LISPRO 4 UNITS: 100 INJECTION, SOLUTION INTRAVENOUS; SUBCUTANEOUS at 04:37

## 2023-10-18 RX ADMIN — PROCHLORPERAZINE EDISYLATE 10 MG: 5 INJECTION INTRAMUSCULAR; INTRAVENOUS at 11:47

## 2023-10-18 RX ADMIN — PROMETHAZINE HYDROCHLORIDE 6.25 MG: 25 INJECTION INTRAMUSCULAR; INTRAVENOUS at 18:47

## 2023-10-18 RX ADMIN — CETIRIZINE HYDROCHLORIDE 10 MG: 10 TABLET ORAL at 10:51

## 2023-10-18 RX ADMIN — PANTOPRAZOLE SODIUM 40 MG: 40 TABLET, DELAYED RELEASE ORAL at 10:51

## 2023-10-18 RX ADMIN — ONDANSETRON 4 MG: 2 INJECTION INTRAMUSCULAR; INTRAVENOUS at 21:49

## 2023-10-18 RX ADMIN — CALCIUM CARBONATE 500 MG: 500 TABLET, CHEWABLE ORAL at 21:49

## 2023-10-18 RX ADMIN — GABAPENTIN 100 MG: 100 CAPSULE ORAL at 21:22

## 2023-10-18 RX ADMIN — HEPARIN SODIUM 5000 UNITS: 5000 INJECTION INTRAVENOUS; SUBCUTANEOUS at 05:06

## 2023-10-18 RX ADMIN — GABAPENTIN 100 MG: 100 CAPSULE ORAL at 10:52

## 2023-10-18 RX ADMIN — ONDANSETRON 4 MG: 4 TABLET, ORALLY DISINTEGRATING ORAL at 16:41

## 2023-10-18 RX ADMIN — CALCIUM CARBONATE 500 MG: 500 TABLET, CHEWABLE ORAL at 13:14

## 2023-10-18 RX ADMIN — INSULIN GLARGINE 12 UNITS: 100 INJECTION, SOLUTION SUBCUTANEOUS at 05:03

## 2023-10-18 ASSESSMENT — PAIN DESCRIPTION - LOCATION: LOCATION: BACK

## 2023-10-18 ASSESSMENT — PAIN DESCRIPTION - DESCRIPTORS: DESCRIPTORS: ACHING

## 2023-10-18 ASSESSMENT — PAIN - FUNCTIONAL ASSESSMENT: PAIN_FUNCTIONAL_ASSESSMENT: ACTIVITIES ARE NOT PREVENTED

## 2023-10-18 ASSESSMENT — PAIN SCALES - GENERAL: PAINLEVEL_OUTOF10: 10

## 2023-10-18 ASSESSMENT — PAIN DESCRIPTION - ORIENTATION: ORIENTATION: LOWER

## 2023-10-18 NOTE — PLAN OF CARE
Pt resting in bed, A&O x4, VSS, SpO2 94% on RA, pt denies pain. RUE PICC placed, peripheral IV's removed from right arm, tubing changed on IV levo and bicarb drip. Bowden patent. Reviewed with pt plan of care for shift and medications, all questions answered, pt voices no concerns.     Problem: Safety - Adult  Goal: Free from fall injury  Outcome: Progressing     Problem: Pain  Goal: Verbalizes/displays adequate comfort level or baseline comfort level  Outcome: Progressing  Flowsheets (Taken 10/17/2023 2000)  Verbalizes/displays adequate comfort level or baseline comfort level:   Encourage patient to monitor pain and request assistance   Assess pain using appropriate pain scale   Administer analgesics based on type and severity of pain and evaluate response   Implement non-pharmacological measures as appropriate and evaluate response     Problem: Cognitive:  Goal: Knowledge of wound care  Description: Knowledge of wound care  Outcome: Progressing  Goal: Understands risk factors for wounds  Description: Understands risk factors for wounds  Outcome: Progressing     Problem: Wound:  Goal: Will show signs of wound healing; wound closure and no evidence of infection  Description: Will show signs of wound healing; wound closure and no evidence of infection  Outcome: Progressing     Problem: Pressure Ulcer:  Goal: Signs of wound healing will improve  Description: Signs of wound healing will improve  Outcome: Progressing  Goal: Absence of new pressure ulcer  Description: Absence of new pressure ulcer  Outcome: Progressing  Goal: Will show no infection signs and symptoms  Description: Will show no infection signs and symptoms  Outcome: Progressing     Problem: Arterial:  Goal: Optimize blood flow for wound healing  Description: Optimize blood flow for wound healing  Outcome: Progressing     Problem: Venous:  Goal: Signs of wound healing will improve  Description: Signs of wound healing will improve  Outcome: Progressing

## 2023-10-19 ENCOUNTER — APPOINTMENT (OUTPATIENT)
Dept: CT IMAGING | Age: 73
End: 2023-10-19
Attending: INTERNAL MEDICINE
Payer: MEDICARE

## 2023-10-19 ENCOUNTER — HOSPITAL ENCOUNTER (INPATIENT)
Age: 73
LOS: 9 days | Discharge: HOME | End: 2023-10-28
Attending: INTERNAL MEDICINE | Admitting: INTERNAL MEDICINE
Payer: MEDICARE

## 2023-10-19 VITALS
TEMPERATURE: 98.2 F | WEIGHT: 187.39 LBS | OXYGEN SATURATION: 100 % | SYSTOLIC BLOOD PRESSURE: 127 MMHG | HEIGHT: 67 IN | BODY MASS INDEX: 29.41 KG/M2 | HEART RATE: 115 BPM | RESPIRATION RATE: 22 BRPM | DIASTOLIC BLOOD PRESSURE: 56 MMHG

## 2023-10-19 DIAGNOSIS — I47.20 VENTRICULAR TACHYARRHYTHMIA (HCC): Primary | ICD-10-CM

## 2023-10-19 PROBLEM — I21.4 NSTEMI (NON-ST ELEVATED MYOCARDIAL INFARCTION) (HCC): Status: ACTIVE | Noted: 2023-10-19

## 2023-10-19 PROBLEM — I25.5 ISCHEMIC CARDIOMYOPATHY: Status: ACTIVE | Noted: 2023-10-19

## 2023-10-19 LAB
ALBUMIN SERPL-MCNC: 3.2 G/DL (ref 3.4–5)
ALBUMIN/GLOB SERPL: 1 {RATIO} (ref 1.1–2.2)
ALP SERPL-CCNC: 70 U/L (ref 40–129)
ALT SERPL-CCNC: 12 U/L (ref 10–40)
ANION GAP SERPL CALCULATED.3IONS-SCNC: 23 MMOL/L (ref 3–16)
ANTI-XA UNFRAC HEPARIN: 0.3 IU/ML (ref 0.3–0.7)
ANTI-XA UNFRAC HEPARIN: 0.56 IU/ML (ref 0.3–0.7)
ANTI-XA UNFRAC HEPARIN: <0.1 IU/ML (ref 0.3–0.7)
APTT BLD: 25.4 SEC (ref 22.7–35.9)
AST SERPL-CCNC: 33 U/L (ref 15–37)
BASE EXCESS BLDV CALC-SCNC: -7.9 MMOL/L (ref -3–3)
BASOPHILS # BLD: 0 K/UL (ref 0–0.2)
BASOPHILS NFR BLD: 0.1 %
BILIRUB SERPL-MCNC: 0.4 MG/DL (ref 0–1)
BUN SERPL-MCNC: 21 MG/DL (ref 7–20)
CA-I BLD-SCNC: 1.27 MMOL/L (ref 1.12–1.32)
CALCIUM SERPL-MCNC: 9.8 MG/DL (ref 8.3–10.6)
CHLORIDE SERPL-SCNC: 96 MMOL/L (ref 99–110)
CO2 BLDV-SCNC: 20 MMOL/L
CO2 SERPL-SCNC: 17 MMOL/L (ref 21–32)
COHGB MFR BLDV: 0.5 % (ref 0–1.5)
CREAT SERPL-MCNC: 1.3 MG/DL (ref 0.6–1.2)
DEPRECATED RDW RBC AUTO: 14.7 % (ref 12.4–15.4)
EKG ATRIAL RATE: 100 BPM
EKG ATRIAL RATE: 101 BPM
EKG ATRIAL RATE: 108 BPM
EKG ATRIAL RATE: 109 BPM
EKG DIAGNOSIS: NORMAL
EKG P AXIS: 71 DEGREES
EKG P AXIS: 72 DEGREES
EKG P AXIS: 98 DEGREES
EKG P-R INTERVAL: 164 MS
EKG P-R INTERVAL: 184 MS
EKG P-R INTERVAL: 200 MS
EKG P-R INTERVAL: 376 MS
EKG Q-T INTERVAL: 316 MS
EKG Q-T INTERVAL: 338 MS
EKG Q-T INTERVAL: 340 MS
EKG Q-T INTERVAL: 342 MS
EKG QRS DURATION: 112 MS
EKG QRS DURATION: 116 MS
EKG QRS DURATION: 116 MS
EKG QRS DURATION: 120 MS
EKG QTC CALCULATION (BAZETT): 407 MS
EKG QTC CALCULATION (BAZETT): 443 MS
EKG QTC CALCULATION (BAZETT): 452 MS
EKG QTC CALCULATION (BAZETT): 457 MS
EKG R AXIS: -26 DEGREES
EKG R AXIS: -28 DEGREES
EKG R AXIS: -32 DEGREES
EKG R AXIS: -34 DEGREES
EKG T AXIS: 100 DEGREES
EKG T AXIS: 101 DEGREES
EKG T AXIS: 154 DEGREES
EKG T AXIS: 231 DEGREES
EKG VENTRICULAR RATE: 100 BPM
EKG VENTRICULAR RATE: 101 BPM
EKG VENTRICULAR RATE: 108 BPM
EKG VENTRICULAR RATE: 109 BPM
EOSINOPHIL # BLD: 0 K/UL (ref 0–0.6)
EOSINOPHIL NFR BLD: 0 %
FUNGUS SPEC CULT: ABNORMAL
FUNGUS SPEC CULT: ABNORMAL
GFR SERPLBLD CREATININE-BSD FMLA CKD-EPI: 44 ML/MIN/{1.73_M2}
GLUCOSE BLD-MCNC: 106 MG/DL (ref 70–99)
GLUCOSE BLD-MCNC: 118 MG/DL (ref 70–99)
GLUCOSE BLD-MCNC: 274 MG/DL (ref 70–99)
GLUCOSE BLD-MCNC: 335 MG/DL (ref 70–99)
GLUCOSE BLD-MCNC: 428 MG/DL (ref 70–99)
GLUCOSE BLD-MCNC: 435 MG/DL (ref 70–99)
GLUCOSE BLD-MCNC: 94 MG/DL (ref 70–99)
GLUCOSE SERPL-MCNC: 481 MG/DL (ref 70–99)
HCO3 BLDV-SCNC: 18.9 MMOL/L (ref 23–29)
HCT VFR BLD AUTO: 28.2 % (ref 36–48)
HGB BLD-MCNC: 9.3 G/DL (ref 12–16)
INR PPP: 1.07 (ref 0.84–1.16)
LACTATE BLDV-SCNC: 2.1 MMOL/L (ref 0.4–2)
LACTATE BLDV-SCNC: 2.5 MMOL/L (ref 0.4–2)
LACTATE BLDV-SCNC: 3.3 MMOL/L (ref 0.4–2)
LACTATE BLDV-SCNC: 4.5 MMOL/L (ref 0.4–2)
LOEFFLER MB STN SPEC: ABNORMAL
LOEFFLER MB STN SPEC: ABNORMAL
LYMPHOCYTES # BLD: 0.7 K/UL (ref 1–5.1)
LYMPHOCYTES NFR BLD: 5.8 %
MAGNESIUM SERPL-MCNC: 1.8 MG/DL (ref 1.8–2.4)
MCH RBC QN AUTO: 30.1 PG (ref 26–34)
MCHC RBC AUTO-ENTMCNC: 33.2 G/DL (ref 31–36)
MCV RBC AUTO: 90.6 FL (ref 80–100)
METHGB MFR BLDV: 0.4 %
MONOCYTES # BLD: 1 K/UL (ref 0–1.3)
MONOCYTES NFR BLD: 8.1 %
NEUTROPHILS # BLD: 10.2 K/UL (ref 1.7–7.7)
NEUTROPHILS NFR BLD: 86 %
O2 THERAPY: ABNORMAL
ORGANISM: ABNORMAL
ORGANISM: ABNORMAL
PCO2 BLDV: 44.1 MMHG (ref 40–50)
PERFORMED ON: ABNORMAL
PERFORMED ON: NORMAL
PH BLDV: 7.25 [PH] (ref 7.35–7.45)
PH BLDV: 7.26 [PH] (ref 7.35–7.45)
PLATELET # BLD AUTO: 260 K/UL (ref 135–450)
PMV BLD AUTO: 7.8 FL (ref 5–10.5)
PO2 BLDV: 67.1 MMHG (ref 25–40)
POTASSIUM SERPL-SCNC: 3.8 MMOL/L (ref 3.5–5.1)
PROT SERPL-MCNC: 6.3 G/DL (ref 6.4–8.2)
PROTHROMBIN TIME: 13.9 SEC (ref 11.5–14.8)
RBC # BLD AUTO: 3.11 M/UL (ref 4–5.2)
SAO2 % BLDV: 89 %
SODIUM SERPL-SCNC: 136 MMOL/L (ref 136–145)
TROPONIN, HIGH SENSITIVITY: 413 NG/L (ref 0–14)
TROPONIN, HIGH SENSITIVITY: 415 NG/L (ref 0–14)
TROPONIN, HIGH SENSITIVITY: 618 NG/L (ref 0–14)
TROPONIN, HIGH SENSITIVITY: 623 NG/L (ref 0–14)
TROPONIN, HIGH SENSITIVITY: 655 NG/L (ref 0–14)
WBC # BLD AUTO: 11.8 K/UL (ref 4–11)

## 2023-10-19 PROCEDURE — 99223 1ST HOSP IP/OBS HIGH 75: CPT | Performed by: INTERNAL MEDICINE

## 2023-10-19 PROCEDURE — 51702 INSERT TEMP BLADDER CATH: CPT

## 2023-10-19 PROCEDURE — 85730 THROMBOPLASTIN TIME PARTIAL: CPT

## 2023-10-19 PROCEDURE — 6370000000 HC RX 637 (ALT 250 FOR IP): Performed by: INTERNAL MEDICINE

## 2023-10-19 PROCEDURE — 99291 CRITICAL CARE FIRST HOUR: CPT | Performed by: INTERNAL MEDICINE

## 2023-10-19 PROCEDURE — C8923 2D TTE W OR W/O FOL W/CON,CO: HCPCS

## 2023-10-19 PROCEDURE — 93010 ELECTROCARDIOGRAM REPORT: CPT | Performed by: INTERNAL MEDICINE

## 2023-10-19 PROCEDURE — 2700000000 HC OXYGEN THERAPY PER DAY

## 2023-10-19 PROCEDURE — 6360000004 HC RX CONTRAST MEDICATION: Performed by: INTERNAL MEDICINE

## 2023-10-19 PROCEDURE — 97166 OT EVAL MOD COMPLEX 45 MIN: CPT

## 2023-10-19 PROCEDURE — 82803 BLOOD GASES ANY COMBINATION: CPT

## 2023-10-19 PROCEDURE — 84484 ASSAY OF TROPONIN QUANT: CPT

## 2023-10-19 PROCEDURE — 94660 CPAP INITIATION&MGMT: CPT

## 2023-10-19 PROCEDURE — 85520 HEPARIN ASSAY: CPT

## 2023-10-19 PROCEDURE — 97530 THERAPEUTIC ACTIVITIES: CPT

## 2023-10-19 PROCEDURE — 83605 ASSAY OF LACTIC ACID: CPT

## 2023-10-19 PROCEDURE — 2580000003 HC RX 258: Performed by: INTERNAL MEDICINE

## 2023-10-19 PROCEDURE — 80053 COMPREHEN METABOLIC PANEL: CPT

## 2023-10-19 PROCEDURE — 94761 N-INVAS EAR/PLS OXIMETRY MLT: CPT

## 2023-10-19 PROCEDURE — 85025 COMPLETE CBC W/AUTO DIFF WBC: CPT

## 2023-10-19 PROCEDURE — 2000000000 HC ICU R&B

## 2023-10-19 PROCEDURE — 97162 PT EVAL MOD COMPLEX 30 MIN: CPT

## 2023-10-19 PROCEDURE — 2580000003 HC RX 258: Performed by: NURSE PRACTITIONER

## 2023-10-19 PROCEDURE — 70450 CT HEAD/BRAIN W/O DYE: CPT

## 2023-10-19 PROCEDURE — 85610 PROTHROMBIN TIME: CPT

## 2023-10-19 PROCEDURE — 6360000002 HC RX W HCPCS: Performed by: INTERNAL MEDICINE

## 2023-10-19 PROCEDURE — 94640 AIRWAY INHALATION TREATMENT: CPT

## 2023-10-19 PROCEDURE — 82330 ASSAY OF CALCIUM: CPT

## 2023-10-19 PROCEDURE — 93005 ELECTROCARDIOGRAM TRACING: CPT | Performed by: INTERNAL MEDICINE

## 2023-10-19 PROCEDURE — 83735 ASSAY OF MAGNESIUM: CPT

## 2023-10-19 RX ORDER — IPRATROPIUM BROMIDE AND ALBUTEROL SULFATE 2.5; .5 MG/3ML; MG/3ML
1 SOLUTION RESPIRATORY (INHALATION)
Status: DISCONTINUED | OUTPATIENT
Start: 2023-10-19 | End: 2023-10-21

## 2023-10-19 RX ORDER — BENZONATATE 100 MG/1
200 CAPSULE ORAL 3 TIMES DAILY PRN
Status: DISCONTINUED | OUTPATIENT
Start: 2023-10-19 | End: 2023-10-28 | Stop reason: HOSPADM

## 2023-10-19 RX ORDER — DULOXETIN HYDROCHLORIDE 60 MG/1
60 CAPSULE, DELAYED RELEASE ORAL 2 TIMES DAILY
Status: DISCONTINUED | OUTPATIENT
Start: 2023-10-19 | End: 2023-10-28 | Stop reason: HOSPADM

## 2023-10-19 RX ORDER — GABAPENTIN 300 MG/1
300 CAPSULE ORAL 2 TIMES DAILY
Status: DISCONTINUED | OUTPATIENT
Start: 2023-10-19 | End: 2023-10-28 | Stop reason: HOSPADM

## 2023-10-19 RX ORDER — POLYETHYLENE GLYCOL 3350 17 G/17G
17 POWDER, FOR SOLUTION ORAL DAILY PRN
Status: DISCONTINUED | OUTPATIENT
Start: 2023-10-19 | End: 2023-10-28 | Stop reason: HOSPADM

## 2023-10-19 RX ORDER — DEXTROSE MONOHYDRATE 100 MG/ML
INJECTION, SOLUTION INTRAVENOUS CONTINUOUS PRN
Status: DISCONTINUED | OUTPATIENT
Start: 2023-10-19 | End: 2023-10-28 | Stop reason: HOSPADM

## 2023-10-19 RX ORDER — MORPHINE SULFATE 15 MG/1
15 TABLET, FILM COATED, EXTENDED RELEASE ORAL EVERY 12 HOURS SCHEDULED
Status: DISCONTINUED | OUTPATIENT
Start: 2023-10-19 | End: 2023-10-28 | Stop reason: HOSPADM

## 2023-10-19 RX ORDER — LEVOFLOXACIN 500 MG/1
500 TABLET, FILM COATED ORAL DAILY
Status: DISCONTINUED | OUTPATIENT
Start: 2023-10-19 | End: 2023-10-19

## 2023-10-19 RX ORDER — ACETAMINOPHEN 650 MG/1
650 SUPPOSITORY RECTAL EVERY 6 HOURS PRN
Status: DISCONTINUED | OUTPATIENT
Start: 2023-10-19 | End: 2023-10-23

## 2023-10-19 RX ORDER — HEPARIN SODIUM 1000 [USP'U]/ML
4000 INJECTION, SOLUTION INTRAVENOUS; SUBCUTANEOUS PRN
Status: DISCONTINUED | OUTPATIENT
Start: 2023-10-19 | End: 2023-10-20 | Stop reason: ALTCHOICE

## 2023-10-19 RX ORDER — INSULIN LISPRO 100 [IU]/ML
0-16 INJECTION, SOLUTION INTRAVENOUS; SUBCUTANEOUS
Status: DISCONTINUED | OUTPATIENT
Start: 2023-10-19 | End: 2023-10-19

## 2023-10-19 RX ORDER — INSULIN GLARGINE 100 [IU]/ML
30 INJECTION, SOLUTION SUBCUTANEOUS 2 TIMES DAILY
Status: DISCONTINUED | OUTPATIENT
Start: 2023-10-19 | End: 2023-10-23

## 2023-10-19 RX ORDER — OXYCODONE HYDROCHLORIDE 5 MG/1
5 TABLET ORAL EVERY 4 HOURS PRN
Status: DISCONTINUED | OUTPATIENT
Start: 2023-10-19 | End: 2023-10-28 | Stop reason: HOSPADM

## 2023-10-19 RX ORDER — ONDANSETRON 2 MG/ML
4 INJECTION INTRAMUSCULAR; INTRAVENOUS EVERY 6 HOURS PRN
Status: DISCONTINUED | OUTPATIENT
Start: 2023-10-19 | End: 2023-10-28 | Stop reason: HOSPADM

## 2023-10-19 RX ORDER — BUDESONIDE 0.5 MG/2ML
0.5 INHALANT ORAL
Status: DISCONTINUED | OUTPATIENT
Start: 2023-10-19 | End: 2023-10-28 | Stop reason: HOSPADM

## 2023-10-19 RX ORDER — PANTOPRAZOLE SODIUM 40 MG/1
40 TABLET, DELAYED RELEASE ORAL
Status: DISCONTINUED | OUTPATIENT
Start: 2023-10-19 | End: 2023-10-28 | Stop reason: HOSPADM

## 2023-10-19 RX ORDER — SODIUM CHLORIDE 0.9 % (FLUSH) 0.9 %
5-40 SYRINGE (ML) INJECTION EVERY 12 HOURS SCHEDULED
Status: DISCONTINUED | OUTPATIENT
Start: 2023-10-19 | End: 2023-10-20 | Stop reason: SDUPTHER

## 2023-10-19 RX ORDER — MORPHINE SULFATE 2 MG/ML
2 INJECTION, SOLUTION INTRAMUSCULAR; INTRAVENOUS
Status: DISCONTINUED | OUTPATIENT
Start: 2023-10-19 | End: 2023-10-28 | Stop reason: HOSPADM

## 2023-10-19 RX ORDER — ASPIRIN 81 MG/1
81 TABLET, CHEWABLE ORAL DAILY
Status: DISCONTINUED | OUTPATIENT
Start: 2023-10-19 | End: 2023-10-28 | Stop reason: HOSPADM

## 2023-10-19 RX ORDER — ACETAMINOPHEN 325 MG/1
650 TABLET ORAL EVERY 6 HOURS PRN
Status: DISCONTINUED | OUTPATIENT
Start: 2023-10-19 | End: 2023-10-20 | Stop reason: SDUPTHER

## 2023-10-19 RX ORDER — HEPARIN SODIUM 1000 [USP'U]/ML
2000 INJECTION, SOLUTION INTRAVENOUS; SUBCUTANEOUS PRN
Status: DISCONTINUED | OUTPATIENT
Start: 2023-10-19 | End: 2023-10-20 | Stop reason: ALTCHOICE

## 2023-10-19 RX ORDER — INSULIN LISPRO 100 [IU]/ML
0-16 INJECTION, SOLUTION INTRAVENOUS; SUBCUTANEOUS EVERY 4 HOURS
Status: DISCONTINUED | OUTPATIENT
Start: 2023-10-19 | End: 2023-10-19

## 2023-10-19 RX ORDER — NITROGLYCERIN 20 MG/100ML
5-200 INJECTION INTRAVENOUS CONTINUOUS
Status: DISCONTINUED | OUTPATIENT
Start: 2023-10-19 | End: 2023-10-20

## 2023-10-19 RX ORDER — ASPIRIN 325 MG
325 TABLET ORAL ONCE
Status: CANCELLED | OUTPATIENT
Start: 2023-10-20

## 2023-10-19 RX ORDER — LORAZEPAM 2 MG/ML
1 INJECTION INTRAMUSCULAR ONCE
Status: COMPLETED | OUTPATIENT
Start: 2023-10-19 | End: 2023-10-19

## 2023-10-19 RX ORDER — ONDANSETRON 4 MG/1
4 TABLET, ORALLY DISINTEGRATING ORAL EVERY 8 HOURS PRN
Status: DISCONTINUED | OUTPATIENT
Start: 2023-10-19 | End: 2023-10-28 | Stop reason: HOSPADM

## 2023-10-19 RX ORDER — DOCUSATE SODIUM 100 MG/1
100 CAPSULE, LIQUID FILLED ORAL 2 TIMES DAILY PRN
Status: DISCONTINUED | OUTPATIENT
Start: 2023-10-19 | End: 2023-10-28 | Stop reason: HOSPADM

## 2023-10-19 RX ORDER — LEVOFLOXACIN 500 MG/1
750 TABLET, FILM COATED ORAL EVERY OTHER DAY
Status: COMPLETED | OUTPATIENT
Start: 2023-10-19 | End: 2023-10-23

## 2023-10-19 RX ORDER — ATORVASTATIN CALCIUM 40 MG/1
40 TABLET, FILM COATED ORAL DAILY
Status: DISCONTINUED | OUTPATIENT
Start: 2023-10-19 | End: 2023-10-28 | Stop reason: HOSPADM

## 2023-10-19 RX ORDER — 0.9 % SODIUM CHLORIDE 0.9 %
250 INTRAVENOUS SOLUTION INTRAVENOUS ONCE
Status: COMPLETED | OUTPATIENT
Start: 2023-10-19 | End: 2023-10-20

## 2023-10-19 RX ORDER — CYCLOBENZAPRINE HCL 10 MG
5 TABLET ORAL 2 TIMES DAILY PRN
Status: DISCONTINUED | OUTPATIENT
Start: 2023-10-19 | End: 2023-10-28 | Stop reason: HOSPADM

## 2023-10-19 RX ORDER — NITROGLYCERIN 0.4 MG/1
0.4 TABLET SUBLINGUAL EVERY 5 MIN PRN
Status: DISCONTINUED | OUTPATIENT
Start: 2023-10-19 | End: 2023-10-28 | Stop reason: HOSPADM

## 2023-10-19 RX ORDER — CLOPIDOGREL BISULFATE 75 MG/1
75 TABLET ORAL DAILY
Status: DISCONTINUED | OUTPATIENT
Start: 2023-10-19 | End: 2023-10-28 | Stop reason: HOSPADM

## 2023-10-19 RX ORDER — CALCIUM CARBONATE 500 MG/1
500 TABLET, CHEWABLE ORAL 3 TIMES DAILY PRN
Status: DISCONTINUED | OUTPATIENT
Start: 2023-10-19 | End: 2023-10-28 | Stop reason: HOSPADM

## 2023-10-19 RX ORDER — INSULIN LISPRO 100 [IU]/ML
0-16 INJECTION, SOLUTION INTRAVENOUS; SUBCUTANEOUS EVERY 4 HOURS
Status: DISCONTINUED | OUTPATIENT
Start: 2023-10-20 | End: 2023-10-28 | Stop reason: HOSPADM

## 2023-10-19 RX ORDER — ARFORMOTEROL TARTRATE 15 UG/2ML
15 SOLUTION RESPIRATORY (INHALATION)
Status: DISCONTINUED | OUTPATIENT
Start: 2023-10-19 | End: 2023-10-21

## 2023-10-19 RX ORDER — INSULIN LISPRO 100 [IU]/ML
0-4 INJECTION, SOLUTION INTRAVENOUS; SUBCUTANEOUS NIGHTLY
Status: DISCONTINUED | OUTPATIENT
Start: 2023-10-19 | End: 2023-10-19

## 2023-10-19 RX ORDER — ROFLUMILAST 500 UG/1
500 TABLET ORAL DAILY
Status: DISCONTINUED | OUTPATIENT
Start: 2023-10-19 | End: 2023-10-28 | Stop reason: HOSPADM

## 2023-10-19 RX ORDER — CETIRIZINE HYDROCHLORIDE 10 MG/1
10 TABLET ORAL DAILY
Status: DISCONTINUED | OUTPATIENT
Start: 2023-10-19 | End: 2023-10-28 | Stop reason: HOSPADM

## 2023-10-19 RX ORDER — HEPARIN SODIUM 10000 [USP'U]/100ML
1000 INJECTION, SOLUTION INTRAVENOUS CONTINUOUS
Status: DISCONTINUED | OUTPATIENT
Start: 2023-10-19 | End: 2023-10-20

## 2023-10-19 RX ADMIN — ARFORMOTEROL TARTRATE 15 MCG: 15 SOLUTION RESPIRATORY (INHALATION) at 11:29

## 2023-10-19 RX ADMIN — INSULIN LISPRO 16 UNITS: 100 INJECTION, SOLUTION INTRAVENOUS; SUBCUTANEOUS at 06:26

## 2023-10-19 RX ADMIN — ACETAMINOPHEN 650 MG: 325 TABLET ORAL at 17:45

## 2023-10-19 RX ADMIN — ATORVASTATIN CALCIUM 40 MG: 40 TABLET, FILM COATED ORAL at 17:45

## 2023-10-19 RX ADMIN — INSULIN LISPRO 12 UNITS: 100 INJECTION, SOLUTION INTRAVENOUS; SUBCUTANEOUS at 09:16

## 2023-10-19 RX ADMIN — AMIODARONE HYDROCHLORIDE 0.5 MG/MIN: 50 INJECTION, SOLUTION INTRAVENOUS at 19:59

## 2023-10-19 RX ADMIN — IPRATROPIUM BROMIDE AND ALBUTEROL SULFATE 1 DOSE: 2.5; .5 SOLUTION RESPIRATORY (INHALATION) at 07:38

## 2023-10-19 RX ADMIN — NITROGLYCERIN 5 MCG/MIN: 20 INJECTION INTRAVENOUS at 06:20

## 2023-10-19 RX ADMIN — OXYCODONE HYDROCHLORIDE 5 MG: 5 TABLET ORAL at 20:41

## 2023-10-19 RX ADMIN — ASPIRIN 162 MG: 81 TABLET, COATED ORAL at 00:51

## 2023-10-19 RX ADMIN — MORPHINE SULFATE 15 MG: 15 TABLET, FILM COATED, EXTENDED RELEASE ORAL at 17:46

## 2023-10-19 RX ADMIN — INSULIN LISPRO 8 UNITS: 100 INJECTION, SOLUTION INTRAVENOUS; SUBCUTANEOUS at 12:00

## 2023-10-19 RX ADMIN — HYDROCORTISONE SODIUM SUCCINATE 50 MG: 100 INJECTION, POWDER, FOR SOLUTION INTRAMUSCULAR; INTRAVENOUS at 12:01

## 2023-10-19 RX ADMIN — HEPARIN SODIUM 4000 UNITS: 1000 INJECTION INTRAVENOUS; SUBCUTANEOUS at 00:52

## 2023-10-19 RX ADMIN — Medication 10 ML: at 20:11

## 2023-10-19 RX ADMIN — LORAZEPAM 1 MG: 2 INJECTION INTRAMUSCULAR; INTRAVENOUS at 06:02

## 2023-10-19 RX ADMIN — IPRATROPIUM BROMIDE AND ALBUTEROL SULFATE 1 DOSE: 2.5; .5 SOLUTION RESPIRATORY (INHALATION) at 11:29

## 2023-10-19 RX ADMIN — AMIODARONE HYDROCHLORIDE 0.5 MG/MIN: 50 INJECTION, SOLUTION INTRAVENOUS at 10:05

## 2023-10-19 RX ADMIN — METOPROLOL TARTRATE 25 MG: 25 TABLET, FILM COATED ORAL at 20:41

## 2023-10-19 RX ADMIN — MUPIROCIN: 20 OINTMENT TOPICAL at 20:12

## 2023-10-19 RX ADMIN — ARFORMOTEROL TARTRATE 15 MCG: 15 SOLUTION RESPIRATORY (INHALATION) at 20:30

## 2023-10-19 RX ADMIN — AMIODARONE HYDROCHLORIDE 150 MG: 50 INJECTION, SOLUTION INTRAVENOUS at 05:45

## 2023-10-19 RX ADMIN — ASPIRIN 81 MG: 81 TABLET, CHEWABLE ORAL at 17:45

## 2023-10-19 RX ADMIN — PERFLUTREN 1.5 ML: 6.52 INJECTION, SUSPENSION INTRAVENOUS at 07:13

## 2023-10-19 RX ADMIN — Medication 10 ML: at 17:48

## 2023-10-19 RX ADMIN — IPRATROPIUM BROMIDE AND ALBUTEROL SULFATE 1 DOSE: 2.5; .5 SOLUTION RESPIRATORY (INHALATION) at 16:23

## 2023-10-19 RX ADMIN — AMIODARONE HYDROCHLORIDE 1 MG/MIN: 50 INJECTION, SOLUTION INTRAVENOUS at 05:55

## 2023-10-19 RX ADMIN — MUPIROCIN: 20 OINTMENT TOPICAL at 17:48

## 2023-10-19 RX ADMIN — DULOXETINE HYDROCHLORIDE 60 MG: 60 CAPSULE, DELAYED RELEASE ORAL at 17:45

## 2023-10-19 RX ADMIN — IPRATROPIUM BROMIDE AND ALBUTEROL SULFATE 1 DOSE: 2.5; .5 SOLUTION RESPIRATORY (INHALATION) at 20:25

## 2023-10-19 RX ADMIN — INSULIN GLARGINE 30 UNITS: 100 INJECTION, SOLUTION SUBCUTANEOUS at 09:16

## 2023-10-19 RX ADMIN — SODIUM CHLORIDE 250 ML: 9 INJECTION, SOLUTION INTRAVENOUS at 23:29

## 2023-10-19 RX ADMIN — HYDROCORTISONE SODIUM SUCCINATE 50 MG: 100 INJECTION, POWDER, FOR SOLUTION INTRAMUSCULAR; INTRAVENOUS at 06:07

## 2023-10-19 RX ADMIN — HYDROCORTISONE SODIUM SUCCINATE 50 MG: 100 INJECTION, POWDER, FOR SOLUTION INTRAMUSCULAR; INTRAVENOUS at 17:47

## 2023-10-19 RX ADMIN — CLOPIDOGREL BISULFATE 75 MG: 75 TABLET ORAL at 17:47

## 2023-10-19 RX ADMIN — HEPARIN SODIUM 11.8 UNITS/KG/HR: 10000 INJECTION, SOLUTION INTRAVENOUS at 00:55

## 2023-10-19 RX ADMIN — CETIRIZINE HYDROCHLORIDE 10 MG: 10 TABLET, FILM COATED ORAL at 17:46

## 2023-10-19 RX ADMIN — HEPARIN SODIUM 1000 UNITS/HR: 10000 INJECTION, SOLUTION INTRAVENOUS at 06:11

## 2023-10-19 RX ADMIN — GABAPENTIN 300 MG: 300 CAPSULE ORAL at 17:45

## 2023-10-19 RX ADMIN — BUDESONIDE 500 MCG: 0.5 SUSPENSION RESPIRATORY (INHALATION) at 20:33

## 2023-10-19 RX ADMIN — LEVOFLOXACIN 750 MG: 500 TABLET, FILM COATED ORAL at 17:45

## 2023-10-19 RX ADMIN — PROMETHAZINE HYDROCHLORIDE 6.25 MG: 25 INJECTION INTRAMUSCULAR; INTRAVENOUS at 01:33

## 2023-10-19 RX ADMIN — AMIODARONE HYDROCHLORIDE 1 MG/MIN: 50 INJECTION, SOLUTION INTRAVENOUS at 05:26

## 2023-10-19 ASSESSMENT — PAIN DESCRIPTION - ORIENTATION: ORIENTATION: INNER;LOWER;MID

## 2023-10-19 ASSESSMENT — PAIN DESCRIPTION - LOCATION: LOCATION: BACK;BUTTOCKS

## 2023-10-19 ASSESSMENT — PAIN SCALES - GENERAL
PAINLEVEL_OUTOF10: 0
PAINLEVEL_OUTOF10: 8
PAINLEVEL_OUTOF10: 0

## 2023-10-19 ASSESSMENT — PAIN DESCRIPTION - DESCRIPTORS: DESCRIPTORS: ACHING;DISCOMFORT

## 2023-10-19 ASSESSMENT — PAIN - FUNCTIONAL ASSESSMENT: PAIN_FUNCTIONAL_ASSESSMENT: PREVENTS OR INTERFERES WITH MANY ACTIVE NOT PASSIVE ACTIVITIES

## 2023-10-19 NOTE — PROGRESS NOTES
08:40 ICU team rounding at this time. Breathing treatments changed. Plan to switch BIPAP to high flow nasal cannula. Hold medications for now pending pt responsiveness and ability to follow commands. 09:40 Hospitalist and cardiologist at bedside. Orders for head CT. Nitro gtt stopped. Amio gtt titrated down to 0.5mg/min. SLP to evaluate the pt later.

## 2023-10-19 NOTE — H&P
Hospital Medicine  History & Physical    Patient: Mike Oscar  :  1950  MRN:  [id number]    Date of Service: 10/19/23    Chief Complaint  Altered mental status    HISTORY OF PRESENT ILLNESS:    Mike Oscar is a 68 y.o. female. She was transferred from the [facility] inpatient service. She presented to the [facility] ER the evening of 10/16 for altered mental status. She had a known chronic non-healing stage 4 sacral pressure ulcer. She had been following up in wound clinic with Dr. Deonte Grace. She had voiced a desire not to receive aggressive care for it so was just receiving wound care. She was having escalating sacral pain so underwent sacral MRI prior to her ER visit. 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 She was initially admitted to the ICU where she required low dose pressor support. The coccygeal osteomyelitis was not thought to account for the severity of patient's presentation. She also had recently undergone bronchoscopy for COPD, bronchiectasis, and chronic cough. Serratia was isolated from BAL, but she had no pulmonary symptoms or evidence of active infection so thought to be colonized. Her acute deterioration was thought more likely due to adrenal crisis. Patient takes prednisone 4mg daily chronically for rheumatoid arthritis. She responded promptly to stress dose steroids. As all this occurred the patient's cardiac troponin was monitored and increased from a baseline of 40 to 400. She was transferred to [facility] for cardiac evaluation. I evaluated the patient. She reported she was having ongoing chest and upper abdominal discomfort. After I completed my interview and examination I left the room to order nitroglycerin and morphine. Within a minute of me leaving the room the patient lost consciousness in front of her family and telemetry alarms running out for ventricular fibrillation. Chest compressions were begun immediately.   In the time Continue daily azithromycin. Continue planned 7 day course of levofloxacin.  -  Continue roflumilast.  Resume Trelegy when appropriate. Duonebs QID and albuterol nebs prn for now. CKD 3a  -  Baseline SCr ~ 1 - 1.2.  SCr just normalized following shock related ISI. Adrenal Crisis  -  Cause of recent shock state which resolved. Has ongoing stressors. Maintain hydrocortisone 50mg IV q6h. Coccygeal Osteomyelitis  -  Wound care and outpatient follow up planned. Possible debridement as outpatient. Rheumatoid Arthritis  -  Normally takes chronic prednisone 4mg and abatacept. DVT prophylaxis: SCD's, UFH gtt  Stress ulcers:  PPI  Code Status:  Full  Surrogate/HCPOA: Children  Disposition:  Inpatient for the foreseeable future    Rose Lomax MD MD    I attest that this patient's condition was sufficiently critical to warrant complex medical decision making and critical interventions to sustain vital bodily functions. A total of 70 minutes was spent in direct patient care at the bedside and entering orders. This time did not include separately billable procedures.

## 2023-10-19 NOTE — CONSULTS
Pharmacy to Man[facility]    Dx: ACS? Pt wt = 85 kg. Baseline aPTT = Pending    Oral factor Xa-inhibitors may alter and elevate anti-Xa levels used for unfractionated heparin monitoring. As a result, anti-Xa monitoring is not accurate while Xa-inhibitor activity is detectable. Utilize aPTT monitoring when patient received an oral factor Xa-inhibitor (apixaban, betrixaban, edoxaban or rivaroxaban) within 72 hours prior to admission (please document last administration time). The goal is to allow a washout of oral factor Xa-inhibitors by using aPTT for 72 hours, then change to ant-Xa levels for UFH. Heparin (weight-based) Infusion: CAD/STEMI/NSTEMI/UA/AFib)   Heparin 60 units/kg IVP bolus (max 4,000 units) followed by Heparin infusion at 12 units/kg/hr (recommended max initial rate: 1000 units/hr). Recheck anti-Xa (unless aPTT being used) in 6 hours. Goal anti-Xa 0.3-0.7 IU/mL  Goal aPTT =  seconds.   Kayley Capps, PharmD  10/19/2023 12:08 AM

## 2023-10-19 NOTE — PROGRESS NOTES
Admitted patient from floor, post code blue. Patient kept on BiPAP. Lab request sent. CHG bathing and head-to-toe assessment done. EKG taken and seen by Meredith Martini MD. Family oriented to ICU set-up. Plan of care ongoing.

## 2023-10-19 NOTE — PROGRESS NOTES
Physical Therapy  Facility/Department: [facility] C2 CARD TELEMETRY  Physical Therapy Initial Assessment/Treatment     Name: Allyson El  : 1950  MRN: [id number]  Date of Service: 10/19/2023    Discharge Recommendations:  24 hour supervision or assist, Home with Home health PT   PT Equipment Recommendations  Equipment Needed: No      Patient Diagnosis(es): There were no encounter diagnoses.   Past Medical History:  has a past medical history of Acute on chronic diastolic heart failure due to coronary artery disease ([address]), Acute respiratory failure with hypoxia ([address]), Atherosclerosis of native artery of right lower extremity with rest pain ([address]), Back pain, Branch retinal vein occlusion, Bronchiectasis with acute exacerbation ([facility]), Candidal dermatitis, Cellulitis and abscess of left leg, Cellulitis of left lower extremity, CHF (congestive heart failure) ([facility]), Closed compression fracture of thoracic vertebra ([facility]), Closed fracture of facial bone with routine healing, Closed jaw fracture ([address]), Community acquired pneumonia of left lower lobe of lung, Compression fracture of L1 lumbar vertebra ([facility]), COPD (chronic obstructive pulmonary disease) ([address]), COPD exacerbation ([address]), COVID, Diabetes mellitus ([address]), Fracture of tibial plateau, closed, left, initial encounter, HCAP (healthcare-associated pneumonia), Minimally displaced zone I fracture of sacrum ([address]), MRSA (methicillin resistant staph aureus) culture positive, MRSA (methicillin resistant Staphylococcus aureus), Mucus plugging of bronchi, NSTEMI (non-ST elevated myocardial infarction) ([address]), Osteomyelitis of mandible, Osteoporosis with pathological fracture, Pneumonia of left lower lobe due to infectious organism, Post herpetic neuralgia, Pressure ulcer of left heel, stage 3 ([facility]), Proximal humerus fracture, Rheumatoid arthritis ([address]), Shingles, Sleep apnea, Status post incision and drainage, Surgical wound dehiscence, initial encounter (at [facility] harvest first stand, progressed to min Ax2 with cues)  Stand to Sit: Moderate assistance;Minimum assistance;Assist X2  Gait Training  Gait Training: No    OutComes Score    AM-PAC Score  AM-PAC Inpatient Mobility Raw Score : 12 (10/19/23 1644)  AM-PAC Inpatient T-Scale Score : 35.33 (10/19/23 1644)  Mobility Inpatient CMS 0-100% Score: 68.66 (10/19/23 1644)  Mobility Inpatient CMS G-Code Modifier : CL (10/19/23 1644)        Goals  Short Term Goals  Time Frame for Short Term Goals: 10/26/23  Short Term Goal 1: pt will complete bed mobility with min A  Short Term Goal 2: pt will complete transfers with min A  Short Term Goal 3: pt will ambulte 20ft with LRAD and CGA  Short Term Goal 4: pt will participate in 12-15 reps of BLE exercises by 10/22/23  Patient Goals   Patient Goals : \"To go home\"       Education  Patient Education  Education Given To: Patient  Education Provided: Role of Therapy;Plan of Care;Family Education;Transfer Training  Education Method: Verbal  Barriers to Learning: None  Education Outcome: Verbalized understanding;Demonstrated understanding;Continued education needed      Therapy Time   Individual Concurrent Group Co-treatment   Time In 1535         Time Out 1609         Minutes 34         Timed Code Treatment Minutes: 24 Minutes (10 min eval)       Meera Brooks, PT, DPT    If pt is unable to be seen after this session, please let this note serve as discharge summary. Please see case management note for discharge disposition. Thank you.

## 2023-10-19 NOTE — PROGRESS NOTES
08:40 ICU team rounding at this time. Breathing treatments changed. Plan to switch BIPAP to high flow nasal cannula. Hold medications for now pending pt responsiveness and ability to follow commands. 09:40 Hospitalist and cardiologist at bedside. Orders for head CT. Nitro gtt stopped since pt was not reporting chest pain. Amio gtt titrated down to 0.5mg/min. SLP to evaluate the pt later. 10:18 Pt reporting some chest pain. Nitro gtt restarted at 5 mcg/min. Juarez Woodward MD notified and okay with the gtt continuing. 12:15 Juarez Woodward Md notified of CT results.

## 2023-10-19 NOTE — PROGRESS NOTES
Speech Language Pathology  Attempt Note     Name: Mansoor Friend  : 1950  Medical Diagnosis: NSTEMI (non-ST elevated myocardial infarction) ([address]) [I21.4]      SLP attempted to see pt for bedside swallow evaluation (BSE). Order acknowledged and chart reviewed for completion of eval. Evaluation unable to be completed due to pt being lethargic, just coming off bipap. RN requested SLP hold until this afternoon. SLP to re-attempt as pt's condition and schedule allows. RN aware. No charges.     Thank you,    Vania Dunn MA [address] [place] [address]  Speech Language Pathologist

## 2023-10-19 NOTE — CARE COORDINATION
Case Management Assessment  Initial Evaluation    Date/Time of Evaluation: 10/19/2023 12:54 PM  Assessment Completed by: Nia Echeverria RN    If patient is discharged prior to next notation, then this note serves as note for discharge by case management. Patient Name: Federico Cheney                   YOB: 1950  Diagnosis: NSTEMI (non-ST elevated myocardial infarction) [facility]) [I21.4]                   Date / Time: 10/19/2023  4:30 AM    Patient Admission Status: Inpatient   Readmission Risk (Low < 19, Mod (19-27), High > 27): Readmission Risk Score: 25    Current PCP: Liz Wolf MD  PCP verified by CM? Chart Reviewed: Yes      History Provided by: Child/Family  Patient Orientation: Unable to Assess (pt in deep sleep)    Patient Cognition: Other (see comment) (Pt arrested during the night- deep sleep right now)    Hospitalization in the last 30 days (Readmission):  No    If yes, Readmission Assessment in CM Navigator will be completed. Advance Directives:      Code Status: Full Code   Patient's Primary Decision Maker is: Named in [facility] [place]     Primary Decision MakeJason Hauser Child - [phone]    Secondary Decision Maker: Zakialoc Armenta  Child - [phone]    Discharge Planning:    Patient lives with: Alone (family close by) Type of Home: House  Primary Care Giver: Self  Patient Support Systems include: Family Members   Current Financial resources: Medicare  Current community resources: None  Current services prior to admission: Durable Medical Equipment, Home Care            Current DME: Wheelchair, Walker            Type of Home Care services:  Nursing Services    ADLS  Prior functional level: Independent in ADLs/IADLs  Current functional level: Assistance with the following:, Bathing, Dressing, Toileting, Feeding, Mobility    PT AM-PAC:   /24  OT AM-PAC:   /24    Family can provide assistance at DC:     Would you like Case Management to discuss the discharge

## 2023-10-19 NOTE — PROGRESS NOTES
V2.0    [facility] Progress Note      Name:  Aida Vazquez /Age/Sex: 1950  (68 y.o. female)   MRN & CSN:  [id number] & [id number] Encounter Date/Time: 10/19/2023 12:00 PM EDT   Location:  0228/0228-01 PCP: Dulce Maria Sheehan MD     Attending:Alli, Maryjo Apgar, MD       Hospital Day: 1    Assessment and Recommendations   Aida Vazquez is a 68 y.o. female with pmh of hypertension, CAD (CABG), BIMAL clip, cardiomyopathy (EF has improved from 30% to 50%), COPD/bronchiectasis, tracheomalacia, obstructive sleep apnea, diabetes type 2, rheumatoid arthritis (on prednisone 4 mg daily), CKD 3 and chronic nonhealing stage IV pressure ulcer admitted [facility] on 10/1640 follows:  1. Nonhealing stage IV pressure ulcer-MRI consistent with osteomyelitis. ID consulted-continue wound care, no need for antibiotics for chronic wound. 2.  Hypotension: Status post pressors. This is due to adrenal crisis. Currently on IV hydrocortisone. 3.  Bronchiectasis: Critical care/pulmonology did bronchoscopy. BAL 10/13 grew Serratia. Started on Levaquin. 4.  Elevated troponin: Started on heparin drip and transferred here for cardiology evaluation. However, patient went into V-fib cardiac arrest when patient arrived here on 10/18 requiring CPR only. ROSC attained less than 2 minutes. 1.  V-fib cardiac arrest: Currently on amiodarone drip. Cardiology consult appreciated. Possible non-ST elevation MI.  CT scan of the head-no acute infarct. 2.  Non-ST elevation MI: Continue heparin drip and nitroglycerin drip. On metoprolol, aspirin, Plavix and Lipitor. 3.  Cardiomyopathy: Echo today-EF 30% with regional wall motion abnormality. Hold off Entresto and torsemide for now because of recent hypotension. Continue metoprolol. 4.  Hypotension: Status post pressors. This is due to adrenal crisis (patient on prednisone 4 mg daily at home). Continue stress steroid-hydrocortisone 50 mg every 6 hourly.     5.  Chronic sacral nonhealing stage IV pressure ulcer/chronic coccygeal osteomyelitis: Continue wound care. Evaluated by ID recently-no need for antibiotics. 6.  Bronchiectasis: Status post BAL 10/13- Serratia. On Levaquin. 7.  Diabetes type 2: Hyperglycemia due to steroid. Lantus increased to 30 units twice daily. Continue insulin sliding scale. Adjust medication as needed. Chronic problems include hypertension, CAD (status post CABG), BIAML clip, COPD, tracheomalacia, DANIELLE, rheumatoid arthritis, chronic anemia and CKD 3 (baseline creatinine 1.3-1.5). Diet Diet NPO Exceptions are: Ice Chips, Sips of Water with Meds, Sips of Clear Liquids   DVT Prophylaxis [] Lovenox, [x]  Heparin, [] SCDs, [] Ambulation,  [] Eliquis, [] Xarelto  [] Coumadin   Code Status Full Code   Disposition From: Home. Expected Disposition: To be determined. Estimated Date of Discharge: 3 to 5 days  Patient requires continued admission due to cardiac arrest   Surrogate Decision Maker/ POA       Personally reviewed Lab Studies and Imaging         Subjective:     Chief Complaint: Daughters, grandchildren and a sister at bedside. Discussed with her nurse. Patient is sleepy but arousable. She denies chest pain at this time. Review of Systems:      Pertinent positives and negatives discussed in HPI    Objective: Intake/Output Summary (Last 24 hours) at 10/19/2023 1200  Last data filed at 10/19/2023 1000  Gross per 24 hour   Intake --   Output 510 ml   Net -510 ml      Vitals:   Vitals:    10/19/23 0900 10/19/23 1000 10/19/23 1100 10/19/23 1132   BP: 120/68 (!) 118/57 125/62    Pulse: 94 94 92    Resp: 22 27 20    Temp:       TempSrc:       SpO2: 99% 98% 99% 100%   Weight:       Height:             Physical Exam:      General: No apparent respiratory distress. Eyes: EOMI  ENT: neck supple  Cardiovascular: Regular rate. Respiratory: Clear to auscultation  Gastrointestinal: Soft, non tender  Genitourinary:  Bowden catheter noted.  Musculoskeletal: No edema  Skin: Sacral pressure ulcer. Neuro: Sleepy but arousable. Psych: Mood appropriate.          Medications:   Medications:    atorvastatin  40 mg Oral Daily    cetirizine  10 mg Oral Daily    clopidogrel  75 mg Oral Daily    DULoxetine  60 mg Oral BID    gabapentin  300 mg Oral BID    insulin glargine  30 Units SubCUTAneous BID    ipratropium 0.5 mg-albuterol 2.5 mg  1 Dose Inhalation 4x Daily RT    morphine  15 mg Oral 2 times per day    mupirocin   Each Nostril BID    pantoprazole  40 mg Oral QAM AC    Roflumilast  500 mcg Oral Daily    [Held by provider] sacubitril-valsartan  0.5 tablet Oral BID    sodium chloride flush  5-40 mL IntraVENous 2 times per day    aspirin  81 mg Oral Daily    metoprolol tartrate  25 mg Oral BID    hydrocortisone sodium succinate PF  50 mg IntraVENous Q6H    arformoterol tartrate  15 mcg Nebulization BID RT    budesonide  0.5 mg Nebulization BID RT    insulin lispro  0-16 Units SubCUTAneous Q4H    levoFLOXacin  750 mg Oral Every Other Day      Infusions:    heparin (PORCINE) Infusion 1,000 Units/hr (10/19/23 8781)    dextrose      amiodarone (CORDARONE) 450 mg in dextrose 5 % 250 mL infusion 0.5 mg/min (10/19/23 1005)    nitroGLYCERIN 5 mcg/min (10/19/23 0620)     PRN Meds: acetaminophen, 650 mg, Q6H PRN   Or  acetaminophen, 650 mg, Q6H PRN  benzonatate, 200 mg, TID PRN  calcium carbonate, 500 mg, TID PRN  cyclobenzaprine, 5 mg, BID PRN  dextrose, , Continuous PRN  dextrose bolus, 125 mL, PRN   Or  dextrose bolus, 250 mL, PRN  docusate sodium, 100 mg, BID PRN  glucagon (rDNA), 1 mg, PRN  glucose, 4 tablet, PRN  heparin (porcine), 2,000 Units, PRN  heparin (porcine), 4,000 Units, PRN  ondansetron, 4 mg, Q8H PRN   Or  ondansetron, 4 mg, Q6H PRN  polyethylene glycol, 17 g, Daily PRN  oxyCODONE, 5 mg, Q4H PRN  nitroGLYCERIN, 0.4 mg, Q5 Min PRN  morphine, 2 mg, Q15 Min PRN        Labs and Imaging   CT HEAD WO CONTRAST    Result Date: 10/19/2023  EXAMINATION: 10/16/2023 09:15 PM    UROBILINOGEN 0.2 10/16/2023 09:15 PM    BILIRUBINUR Negative 10/16/2023 09:15 PM    BILIRUBINUR NEGATIVE 03/20/2010 12:52 PM    BLOODU TRACE-INTACT 10/16/2023 09:15 PM    GLUCOSEU Negative 10/16/2023 09:15 PM    GLUCOSEU NEGATIVE 03/20/2010 12:52 PM    KETUA Negative 10/16/2023 09:15 PM     Urine Cultures:   Lab Results   Component Value Date/Time    LABURIN No growth at 18 to 36 hours 10/16/2023 09:15 PM     Blood Cultures:   Lab Results   Component Value Date/Time    [facility]  10/16/2023 07:15 PM     No Growth to date. Any change in status will be called. Lab Results   Component Value Date/Time    BLOODCULT2  10/16/2023 07:36 PM     No Growth to date. Any change in status will be called.      Organism:   Lab Results   Component Value Date/Time    ORG Serratia marcescens 10/13/2023 11:45 AM    ORG Zoe dubliniensis 10/13/2023 11:45 AM    ORG Serratia marcescens 10/13/2023 11:45 AM    ORG Candida albicans 10/13/2023 11:45 AM         Electronically signed by Kathleen Godwin MD on 10/19/2023 at 12:00 PM

## 2023-10-19 NOTE — PROGRESS NOTES
Pt admitted to room 208 from [facility]. Pt oriented to room and call light. Tele box applied, pt aware of reason. Bed alarm on, pt aware of reason and instructed to call out for assistance if needing to get up.

## 2023-10-19 NOTE — PROGRESS NOTES
Received call from [facility] staff requesting RN check on pt because pt's monitor was showing a lot of vfib. At same time, pt's family calling for help. Pt unresponsive in bed. Rapid response/code blue initiated.

## 2023-10-19 NOTE — CONSULTS
Pharmacy to Manage Heparin Infusion per Hospital Policy    Diagnosis: ACS? Patient weight = 85 kg (will use adjusted wt if actual body weight > 120% ideal body weight). Dosing weight: 71 kg (ABW)  Baseline aPTT = 25.4 at 0023    History of Anti-Xa Inhibitors (Apixaban, Rivaroxaban, Edoxaban)?: No, will use anti-Xa monitoring    Heparin (weight-based) Infusion: CAD/STEMI/NSTEMI/UA/AFib)   Heparin 60 units/kg IVP bolus (max 4,000 units) followed by Heparin infusion at 12 units/kg/hr (recommended max initial rate: 1000 units/hr). Recheck anti-Xa (unless aPTT being used) in 6 hours. Goal anti-Xa 0.3-0.7 IU/mL    Plan:  Low-Dose Heparin Drip  Plan: Patient transferred from [facility] with drip running at 1000 units/hour.  Anti-Xa level ordered to assess whether patient is currently therapeutic    Next Anti-Xa Level: 10/19 @ [address] [place] [address], PharmD 10/19/2023 5:14 AM    ---------------------------------  10/19 9761  Low-Dose Heparin Drip  Current Rate: 1000 units/hr  10/19 @ 3960 Anti-Xa Level= 0.30  Plan: Per pharmacy dosing, we will not make any changes at this time    Next Anti-Xa Level: 10/19 @ [facility], PharmD 10/19/2023 6:08 AM    10/19 1135  Anti-Xa - 0.56  Continue heparin infusion at 1000 units/hr  Next anti-Xa 10/20 AM  Rell Villalobos PharmD 10/19/2023 12:27 PM    10/20 0815  Anti Xa 0.43 IU/mL upon redraw  Continue with heparin infusion rate of 1000 units/hr  Recheck Anti Xa in 6 hours at [facility], PharmD  10/20/2023 at 10:17 AM

## 2023-10-19 NOTE — CONSULTS
Electrophysiology Consultation   Date: 10/19/2023  Admit Date:  10/19/2023  Reason for Consultation: Ventricular fibrillation  Consult Requesting Physician: Neo Garcia MD     No chief complaint on file. HPI:   Mrs. Nikunj Doshi is a pleasant 68year old female with a medical history significant for ischemic cardiomyopathy status post CABG, rheumatoid arthritis, chronic renal insufficiency, stage 4 coccygeal pressure ulcer, peripheral vascular disease, tracheobronchomalacia, recurrent pneumonia, hypertension, hyperlipidemia, and diabetes mellitus type II who presented from home with altered mental status, and shock, whose clinical course has been complicated by ventricular fibrillation. According to patient and her family last Friday she was seen by her wound care specialist for worsening drainage from her stage IV pressure ulcer. They had considered getting an MRI due to some drainage that was noted. Over the weekend patient got a more somnolent and disoriented. She was also found to be mildly febrile and hypotensive. At this point her family decided to bring her to the emergency room at [facility]. Patient was admitted to [facility] and work-up was initiated for possible septic shock. MRI was concerning for possible early osteomyelitis within her pressure ulcer. Eventually there was some concern for some adrenal crisis given her chronic steroid use. She had some clinical improvement with stress dose steroids however there was a increase in her troponin enzymes (unclear why this was checked at this time). Given the dramatic rise from  she was transferred to [facility] for interventional cardiology to review her case. After patient was transferred to [facility], around 5 AM she suffered an unprovoked ventricular fibrillation event that required external defibrillation.   Patient was then seen by interventional cardiology who later consulted electrophysiology for ICD Value Date/Time    TROPONINI <0.01 02/17/2023 03:41 PM     No results found for: \"BNP\"  Lab Results   Component Value Date/Time    PROTIME 13.9 10/19/2023 12:23 AM    PROTIME 14.1 10/16/2023 06:55 PM    PROTIME 16.9 08/06/2023 05:47 AM    INR 1.07 10/19/2023 12:23 AM    INR 1.09 10/16/2023 06:55 PM    INR 1.37 08/06/2023 05:47 AM     Lab Results   Component Value Date/Time    CHOL 112 10/29/2021 08:23 AM    HDL 34 10/29/2021 08:23 AM    HDL 43 07/22/2010 09:28 AM    TRIG 85 10/29/2021 08:23 AM       Diagnostic and imaging results reviewed. ECG: Sinus tachycardia. LBBB (new). Echo: 10/19/2023   Summary   Limited only f/u for LVEF. The left ventricular systolic function is mildly reduced with an ejection   fraction of 40%. Inferolateral wall akinesis with hypokinesis of remaining   segments. Definity echo contrast is used no intracardiac thrombus. Changes noted from previous echo on 8-3-2022 in left ventricular function. Cath - 8/2021  Artery Findings/Result   LM Normal   LAD Mid 100% after large diagonal, distal supplied by LIMA   Cx 99% ostial, distal supplied by R-L collaterals   RI N/A   RCA 70% distal, % prox   S-O occluded   S-PLB patent   L-LAD  patent   LVEDP 8   LVG NA due to contrast      Cath - 4/2021  1. Left main coronary artery distal 70%. It gave off the left anterior descending artery and left circumflex. 2. Left anterior descending artery has severe atherosclerotic disease. 70% ostial. It was moderate in size. It gave off septal perforators and a moderate sized diagonal branch. The LAD covered the entire apex of the left ventricle. 3. Left circumflex has severe atherosclerotic disease. 99% ostial. It was moderate in size. There was a moderate sized obtuse marginal branch. ~OM1   4. Right coronary artery has severe atherosclerotic disease. It was moderate in size and was the dominant artery. ~100% distal PDA   5.  Left ventriculogram showed

## 2023-10-19 NOTE — ACP (ADVANCE CARE PLANNING)
Advance Care Planning     General Advance Care Planning (ACP) Conversation    Date of Conversation: 10/19/2023  Conducted with:  Healthcare Decision Maker: Named in Advance Directive or Healthcare Power of  (name) Margarita Garcia - daughter    Healthcare Decision Maker:    Primary Decision Maker: Edwin Rudd Child - [phone]    Secondary Decision Maker: Meme Akhtar - Child - [phone]  Click here to complete Healthcare Decision Makers including selection of the Healthcare Decision Maker Relationship (ie \"Primary\"). Today we documented Decision Maker(s) consistent with ACP documents on file. Content/Action Overview:   Has ACP document(s) on file - reflects the patient's care preferences  Reviewed DNR/DNI and patient elects Full Code (Attempt Resuscitation)        Length of Voluntary ACP Conversation in minutes:  <16 minutes (Non-Billable)    Filomena Ortega RN

## 2023-10-19 NOTE — CONSULTS
Consult Note            Date:10/19/2023        Patient Name:Gris Taveras     YOB: 1950     Age:73 y.o. Inpatient consult to Critical Care  Consult performed by: Semaj Lagos MD  Consult ordered by: Arnulfo Millard MD  Reason for consult: V-fib arrest          Chief Complaint   No chief complaint on file. History Obtained From   electronic medical record    History of Present Illness   This is a 71-year-old female who was transferred from [facility] on 10/19/2023. Patient initially presented to [facility] ER on the evening of 10/16 for altered mental status. Patient has a known history of chronic nonhealing stage IV sacral ulcer. During the work-up at [facility] patient had evidence by MRI of coccygeal osteomyelitis and was transferred to the ICU requiring low-dose pressure support. Patient also had an issue of adrenal crisis and was given stress dose steroids. Patient had an acute arrest for ventricular fibrillation. Patient was coded. And transferred to [facility] for NSTEMI and then transferred from floor to ICU after code blue here at [facility].   Patient lethargic and on BiPAP therapy  Was able to talk to the family    Past Medical History     Past Medical History:   Diagnosis Date    Acute on chronic diastolic heart failure due to coronary artery disease ([address])     Acute respiratory failure with hypoxia ([address])     Atherosclerosis of native artery of right lower extremity with rest pain ([address]) 07/25/2017    Back pain     Branch retinal vein occlusion 07/20/2012    Bronchiectasis with acute exacerbation ([address])     Candidal dermatitis 05/01/2023    Cellulitis and abscess of left leg 07/11/2021    Cellulitis of left lower extremity 07/11/2021    S/P vein harvest 05/2021 for CABG    CHF (congestive heart failure) (HCC)     Closed compression fracture of thoracic vertebra ([address]) 01/15/2020    Closed fracture of facial bone with routine healing 11/21/2016    Closed SURGERY  05/30/2013    left temporal artery biopsy    BACK SURGERY  08/2020    BRONCHOSCOPY      BRONCHOSCOPY N/A 06/12/2019    BRONCHOSCOPY ALVEOLAR LAVAGE performed by Apolinar Brown MD at [address] N/A 10/13/2023    BRONCHOSCOPY THERAPUTIC ASPIRATION INITIAL performed by Apolinar Brown MD at [address] [place] [address]  10/13/2023    BRONCHOSCOPY ALVEOLAR LAVAGE performed by Apolinar Brown MD at [address]  05/03/2021    CATARACT REMOVAL      COLONOSCOPY      CORONARY ARTERY BYPASS GRAFT N/A 05/03/2021    CORONARY ARTERY BYPASS X3 WITH LEFT ATRIAL APPENDAGE CLIP, 5 LEVEL BILATERAL INTERCOSTAL NERVE BLOCK, STERNAL PLATING performed by Shabbir Alexandre MD at [address] CATH  7/14/2021    IR MIDLINE CATH 7/14/2021 [facility] SPECIAL PROCEDURES    KNEE ARTHROSCOPY      left    KYPHOSIS SURGERY      LAMINECTOMY      LEG SURGERY Left 7/13/2021    INCISION AND DEBRIDEMENT LEFT LOWER LEG WOUND WITH POSSIBLE WOUND VAC PLACEMENT performed by Tra Hanks MD at [facility]  02/2020    MEDIASTINOSCOPY N/A 05/05/2021    MEDIASTINAL EXPLORATION AND EVACUATION OF HEMATOMA performed by Shabbir Alexandre MD at [address]  01/08/2021    sacral wound debridement    PRESSURE ULCER DEBRIDEMENT N/A 01/08/2021    SACRAL WOUND DEBRIDEMENT performed by Wilder Moore MD at [address] [facility]  05/07/2013    FESS with balloon    SPINAL FUSION      TRANSESOPHAGEAL ECHOCARDIOGRAM  11/02/2021    TUBAL LIGATION      UPPER GASTROINTESTINAL ENDOSCOPY  04/08/2014    Dilitation        Medications     Prior to Admission medications    Medication Sig Start Date End Date Taking?  Authorizing Provider   azithromycin (ZITHROMAX) 250 MG tablet Take 1 tablet by mouth daily 9/25/23   Provider, MD Cynthia   fluticasone-umeclidin-vilant

## 2023-10-19 NOTE — PLAN OF CARE
Pt seen. Full note to come. [facility] as outpatient.     Jose Brizuela MD  Cardiac Electrophysiology  [id number] [facility]  [phone] [facility]

## 2023-10-19 NOTE — PROGRESS NOTES
10/19/23 0544   NIV Type   $NIV $Daily Charge   NIV Started/Stopped On   Equipment Type v60   Mode Bilevel   Mask Type Full face mask   Mask Size Medium   Assessment   Pulse (!) 115   Respirations 28   /63   SpO2 100 %   Comfort Level Good   Using Accessory Muscles Yes   Mask Compliance Good   Skin Assessment Clean, dry, & intact   Skin Protection for O2 Device Yes   Orientation Middle   Location Nose   Settings/Measurements   PIP Observed 13 cm H20   IPAP 12 cmH20   CPAP/EPAP 6 cmH2O   Vt (Measured) 738 mL   Rate Ordered 10   FiO2  50 %   I Time/ I Time % 1 s   Minute Volume (L/min) 19.6 Liters   Mask Leak (lpm) 18 lpm   Patient's Home Machine No   Alarm Settings   Alarms On Y   Low Pressure (cmH2O) 6 cmH2O   High Pressure (cmH2O) 30 cmH2O   Apnea (secs) 20 secs   RR Low (bpm) 6   RR High (bpm) 45 br/min

## 2023-10-19 NOTE — CONSULTS
[facility]        CHIEF COMPLAINT  Chest pain, elevated troponin      HISTORY OF PRESENTING ILLNESS  Mike Oscar is a 68 y.o. patient with history of CAD and CABG, cardiomyopathy with recovered EF, CHF, COPD, bronchiectasis, recurrent respiratory infections, diabetes, chronic stage IV sacral pressure ulcer with infection sepsis who presented to the [facility] with complaints of altered mental status and increased pain in her lower back wound. On initial presentation, she was noted to be hypotensive and was treated for septic shock although the diagnosis was later changed to adrenal crisis and she received a course of steroids with improvement in hemodynamics. Hospital course was also complicated by ISI and nephrology was on board and he had improvement in her creatinine to near normal.  Yesterday she had acute worsening of shortness of breath and ? Chest pain. ECG was suggesting incomplete left bundle branch block which is unchanged from previous but her cardiac markers continue to rise. She was started on IV heparin and transferred to [facility] for further work-up and potential coronary/graft angiogram.    This morning around 5:00 she had sudden loss of consciousness and was noted to be in V-fib on the monitor. The episode lasted about 45 seconds and resolved spontaneously with her returning back to sinus rhythm and regaining consciousness before any pharmacologic or electrical intervention could be performed. An echocardiogram was ordered that shows significantly reduced in ejection fraction to 35-40% with regional wall motion abnormalities most prominently in the inferior wall. She was started on IV amiodarone. Cardiology is consulted for further work-up. Currently she remains lethargic but arousable. She nods yes to when asked about chest pain but history not reliable given her somnolent and lethargic.   She remains hemodynamically 05:40 AM    RDW 14.7 10/19/2023 05:40 AM     10/19/2023 05:40 AM     Lab Results   Component Value Date/Time     10/19/2023 05:40 AM    K 3.8 10/19/2023 05:40 AM    K 3.8 10/18/2023 10:34 PM    CL 96 10/19/2023 05:40 AM    CO2 17 10/19/2023 05:40 AM    BUN 21 10/19/2023 05:40 AM    CREATININE 1.3 10/19/2023 05:40 AM    GFRAA 54 10/10/2022 11:30 AM    GFRAA >60 05/07/2013 07:32 AM    AGRATIO 1.0 10/19/2023 05:40 AM    LABGLOM 44 10/19/2023 05:40 AM    LABGLOM 34 09/15/2023 12:00 AM    GLUCOSE 481 10/19/2023 05:40 AM    PROT 6.3 10/19/2023 05:40 AM    PROT 7.1 03/21/2013 12:37 PM    CALCIUM 9.8 10/19/2023 05:40 AM    BILITOT 0.4 10/19/2023 05:40 AM    ALKPHOS 70 10/19/2023 05:40 AM    AST 33 10/19/2023 05:40 AM    ALT 12 10/19/2023 05:40 AM     No results found for: \"PTINR\"  Lab Results   Component Value Date    TROPONINI <0.01 02/17/2023     Lab Results   Component Value Date    CHOL 112 10/29/2021    CHOL 107 08/11/2021    CHOL 86 05/03/2021     Lab Results   Component Value Date    TRIG 85 10/29/2021    TRIG 97 08/11/2021    TRIG 64 05/03/2021     Lab Results   Component Value Date    HDL 34 (L) 10/29/2021    HDL 34 (L) 08/11/2021    HDL 38 (L) 05/03/2021     Lab Results   Component Value Date    LDLCALC 61 10/29/2021    LDLCALC 54 08/11/2021    [address] [facility] [address] 05/03/2021     No results found for: \"CHOLHDLRATIO\"      CARDIAC STUDIES    EKG - sinus rhythm, incomplete left bundle branch block    Echo - pending    Echo - 8/2022   Global left ventricular systolic function is low normal with ejection   fraction estimated from 50 % to 55 %. Abnormal (paradoxical) septal motion is present likely due to post operative   status. Mild to moderate mitral regurgitation. Mild aortic regurgitation. Moderate tricuspid regurgitation. Compared to exam done 3/24/2022, Left ventricular systolic function has   improved.     Stress -     Cath - 8/2021  Artery Findings/Result   LM Normal   LAD Mid 100% after large

## 2023-10-19 NOTE — PROGRESS NOTES
Occupational Therapy  Facility/Department: [facility] C2 CARD TELEMETRY  Occupational Therapy Initial Assessment and Treatment Note    Name: Isha Rdz  : 1950  MRN: [id number]  Date of Service: 10/19/2023    Discharge Recommendations:  24 hour supervision or assist, Home with Home health OT  OT Equipment Recommendations  Equipment Needed:  (defer)    If pt is unable to be seen after this session, please let this note serve as discharge summary. Please see case management note for discharge disposition. Thank you. Patient Diagnosis(es): There were no encounter diagnoses.   Past Medical History:  has a past medical history of Acute on chronic diastolic heart failure due to coronary artery disease ([address]), Acute respiratory failure with hypoxia ([address]), Atherosclerosis of native artery of right lower extremity with rest pain ([address]), Back pain, Branch retinal vein occlusion, Bronchiectasis with acute exacerbation (HCC), Candidal dermatitis, Cellulitis and abscess of left leg, Cellulitis of left lower extremity, CHF (congestive heart failure) (HCC), Closed compression fracture of thoracic vertebra (HCC), Closed fracture of facial bone with routine healing, Closed jaw fracture ([address]), Community acquired pneumonia of left lower lobe of lung, Compression fracture of L1 lumbar vertebra (HCC), COPD (chronic obstructive pulmonary disease) ([address]), COPD exacerbation ([address]), COVID, Diabetes mellitus ([address]), Fracture of tibial plateau, closed, left, initial encounter, HCAP (healthcare-associated pneumonia), Minimally displaced zone I fracture of sacrum ([address]), MRSA (methicillin resistant staph aureus) culture positive, MRSA (methicillin resistant Staphylococcus aureus), Mucus plugging of bronchi, NSTEMI (non-ST elevated myocardial infarction) ([address]), Osteomyelitis of mandible, Osteoporosis with pathological fracture, Pneumonia of left lower lobe due to infectious organism, Post herpetic neuralgia, Pressure ulcer of left heel, dressing)  Homemaking Assistance: Needs assistance (Someone comes in to assist with cleaning and laundry, is able to fix her self food)  Homemaking Responsibilities: No  Ambulation Assistance: Needs assistance (Ambulates short distance in house to bathroom, primarity uses WC in the house and electric WC in the community)  Transfer Assistance: Independent  Active : No  Patient's  Info: family trasport  Occupation: Retired       Objective   Pulse: 80  Heart Rate Source: Monitor  BP: (!) 112/54  BP Location: Left upper arm  BP Method: Automatic  Patient Position: Lying right side  MAP (Calculated): 73  Respirations: 21  SpO2: 98 %  O2 Device: (S) Nasal cannula            Observation/Palpation  Posture: Fair  Safety Devices  Type of Devices: All omid prominences offloaded; All fall risk precautions in place;Gait belt;Left in bed;Call light within reach;Nurse notified  Restraints  Restraints Initially in Place: No    Bed Mobility Training  Bed Mobility Training: Yes  Supine to Sit: Moderate assistance; Adaptive equipment; Additional time (HOB elevated)  Sit to Supine: Moderate assistance; Additional time; Adaptive equipment (HOB elevated)  Balance  Sitting: With support  Standing: Impaired (With rollator)  Standing - Static: Constant support; Fair  Standing - Dynamic: Constant support; Fair  Transfer Training  Transfer Training: Yes  Interventions: Safety awareness training; Tactile cues; Verbal cues; Weight shifting training/pressure relief (Cues for anterior weight shift and for hand placement)  Sit to Stand:  Moderate assistance;Minimum assistance;Assist X2 (Mod Ax2 for first stand, progressed to min Ax2 with cues)  Stand to Sit: Moderate assistance;Minimum assistance;Assist X2  Gait Training  Gait Training: No       AROM: Within functional limits  Strength: Generally decreased, functional  Coordination: Within functional limits  Tone: Normal  Sensation: Intact    ADL  Toileting: Dependent/Total  Toileting Skilled

## 2023-10-19 NOTE — PLAN OF CARE
Problem: Safety - Adult  Goal: Free from fall injury  Outcome: Progressing     Problem: Chronic Conditions and Co-morbidities  Goal: Patient's chronic conditions and co-morbidity symptoms are monitored and maintained or improved  Outcome: Progressing  Flowsheets (Taken 10/19/2023 0600)  Care Plan - Patient's Chronic Conditions and Co-Morbidity Symptoms are Monitored and Maintained or Improved:   Monitor and assess patient's chronic conditions and comorbid symptoms for stability, deterioration, or improvement   Collaborate with multidisciplinary team to address chronic and comorbid conditions and prevent exacerbation or deterioration   Update acute care plan with appropriate goals if chronic or comorbid symptoms are exacerbated and prevent overall improvement and discharge     Problem: Discharge Planning  Goal: Discharge to home or other facility with appropriate resources  Outcome: Progressing  Flowsheets (Taken 10/19/2023 0600)  Discharge to home or other facility with appropriate resources:   Identify barriers to discharge with patient and caregiver   Arrange for needed discharge resources and transportation as appropriate   Identify discharge learning needs (meds, wound care, etc)   Refer to discharge planning if patient needs post-hospital services based on physician order or complex needs related to functional status, cognitive ability or social support system     Problem: Respiratory - Adult  Goal: Achieves optimal ventilation and oxygenation  Outcome: Progressing  Flowsheets (Taken 10/19/2023 0600)  Achieves optimal ventilation and oxygenation:   Assess for changes in respiratory status   Assess for changes in mentation and behavior   Position to facilitate oxygenation and minimize respiratory effort   Oxygen supplementation based on oxygen saturation or arterial blood gases   Assess the need for suctioning and aspirate as needed   Respiratory therapy support as indicated   Assess and instruct to report medications to maintain glucose within target range

## 2023-10-19 NOTE — PROGRESS NOTES
Occupational Therapy  10:00 AM Orders received for therapy this date. Upon speaking with RN, pt stated pt too lethargic at this time to be seen for therapy. Will check back later as pt status tolerates and schedule allows. 13:10 Attempted to see pt for second time this date. RN related pt remains very lethargic and stated to hold for this date. Will attempt tomorrow or when pt medically appropriate.      Edita Miller OTR/JOEY Castaneda, PT

## 2023-10-19 NOTE — PROGRESS NOTES
Comprehensive Nutrition Assessment    Type and Reason for Visit:  Initial, Positive Nutrition Screen, Wound    Nutrition Recommendations/Plan:   Diet per SLP   Add glucerna w/ meals when able to consume PO   Monitor nutrition adequacy, pertinent labs, bowel habits, wt changes, and clinical progress     Malnutrition Assessment:  Malnutrition Status: At risk for malnutrition (Comment) (10/19/23 1412)    Context:  Acute Illness     Findings of the 6 clinical characteristics of malnutrition:  Energy Intake:  Mild decrease in energy intake (Comment)    Nutrition Assessment:    Positive nutrition screen for wounds: 68 y.o. f w/ pmh of rheumatoid arthritis, DM, HTN, hyperlipidemia, bronchiectasis, tracheomalacia, CAD s/p CABG, chronic pain syndrome, obstructive sleep apnea admitted w/ septic shock and AMS. NPO this morning pending cardiology consult. Per cardiology note, pt needs coronary angiogram and head CT. SLP consulted, pt not appropriate for eval today d/t lethargy and just coming off Bipap. Per chart review, pt not eating well prior to admission. No wt loss noted in EMR. Recommend advancing diet as soon as medically feasible per SLP eval. Will add glucerna when medically appropriate. Will continue to monitor. Nutrition Related Findings:    Mg 3.3. -481 x 24 hours. No BM Wound Type: Pressure Injury, Stage III (Pressure injury stage 4)       Current Nutrition Intake & Therapies:    Average Meal Intake: NPO  Average Supplements Intake: NPO  Diet NPO Exceptions are: Ice Chips, Sips of Water with Meds, Sips of Clear Liquids    Anthropometric Measures:  Height: 170.2 cm (5' 7.01\")  Ideal Body Weight (IBW): 135 lbs (61 kg)       Current Body Weight: 85 kg (187 lb 6.3 oz), 138.8 % IBW. Weight Source: Bed Scale  Current BMI (kg/m2): 29.3        Weight Adjustment For: No Adjustment                 BMI Categories: Overweight (BMI 25.0-29. 9)    Estimated Daily Nutrient Needs:  Energy Requirements Based On: Kcal/kg (28-33 kcals/kg)  Weight Used for Energy Requirements: Ideal (61 kg)  Energy (kcal/day): 2566-6238  Weight Used for Protein Requirements: Ideal (1.2-1.5 g/kg)  Protein (g/day): 73-92 g  Method Used for Fluid Requirements: 1 ml/kcal  Fluid (ml/day): 4103-5024 ml    Nutrition Diagnosis:   Inadequate oral intake related to inadequate protein-energy intake, increase demand for energy/nutrients, impaired respiratory function as evidenced by poor intake prior to admission, wounds    Nutrition Interventions:   Food and/or Nutrient Delivery: Start Oral Diet, Start Oral Nutrition Supplement  Nutrition Education/Counseling: Education not appropriate  Coordination of Nutrition Care: Continue to monitor while inpatient, Interdisciplinary Rounds       Goals:     Goals: PO intake 50% or greater, prior to discharge       Nutrition Monitoring and Evaluation:   Behavioral-Environmental Outcomes: None Identified  Food/Nutrient Intake Outcomes: Food and Nutrient Intake, Supplement Intake  Physical Signs/Symptoms Outcomes: Weight, Nutrition Focused Physical Findings, Biochemical Data, Constipation    Discharge Planning:     Too soon to determine     Mabel Lawrence, [address] [facility], [address] [place] [address]  Contact: Office: [phone]; [address] [place] [address] Road: [phone]

## 2023-10-19 NOTE — PROGRESS NOTES
Pt c/o increasing pain and discomfort in her mid abdomen and chest, describing it as \"burning and achey\". STAT EKG completed, perfect serve page sent to [facility] requesting PRN orders for chest pain.

## 2023-10-19 NOTE — SIGNIFICANT EVENT
Paged on patient being nauseous, hypoxic\\      Arrival, patient was complaining of nausea and shortness of breath. No known relieving or exacerbating factor has been going on for 1 day. On exam patient was in respiratory stress tripoding sitting up while on high flow nasal cannula. Patient was given nebulizers with some improvement, transferred to ICU for BiPAP. Stat labs resulted with patient's troponin being at 400. EKG showing sinus tachycardia with incomplete left bundle ann block negative Sgarbossa. Start patient on low-dose aspirin, heparin. I spoke with cardiology and discussed the case. Transfer center initiated to transfer patient to [facility]. I spoke with family at bedside as well and was in agreement as well. Dx NSTEMI    Due to the immediate potential for life-threatening deterioration due to NSTEMI, acute hypoxic respiratory failure, I spent 53 minutes providing critical care. This time is excluding time spent performing procedures.

## 2023-10-20 ENCOUNTER — APPOINTMENT (OUTPATIENT)
Dept: CARDIAC CATH/INVASIVE PROCEDURES | Age: 73
End: 2023-10-20
Attending: INTERNAL MEDICINE
Payer: MEDICARE

## 2023-10-20 PROBLEM — I47.20 VENTRICULAR TACHYARRHYTHMIA (HCC): Status: ACTIVE | Noted: 2023-10-20

## 2023-10-20 PROBLEM — I49.01 VENTRICULAR FIBRILLATION (HCC): Status: ACTIVE | Noted: 2023-10-20

## 2023-10-20 LAB
ALBUMIN SERPL-MCNC: 2.8 G/DL (ref 3.4–5)
ALBUMIN/GLOB SERPL: 1 {RATIO} (ref 1.1–2.2)
ALP SERPL-CCNC: 62 U/L (ref 40–129)
ALT SERPL-CCNC: 10 U/L (ref 10–40)
ANION GAP SERPL CALCULATED.3IONS-SCNC: 8 MMOL/L (ref 3–16)
ANTI-XA UNFRAC HEPARIN: 0.43 IU/ML (ref 0.3–0.7)
ANTI-XA UNFRAC HEPARIN: >1.1 IU/ML (ref 0.3–0.7)
AST SERPL-CCNC: 24 U/L (ref 15–37)
BACTERIA BLD CULT ORG #2: NORMAL
BACTERIA BLD CULT: NORMAL
BASOPHILS # BLD: 0 K/UL (ref 0–0.2)
BASOPHILS NFR BLD: 0.2 %
BILIRUB SERPL-MCNC: 0.3 MG/DL (ref 0–1)
BUN SERPL-MCNC: 18 MG/DL (ref 7–20)
CALCIUM SERPL-MCNC: 9.6 MG/DL (ref 8.3–10.6)
CHLORIDE SERPL-SCNC: 103 MMOL/L (ref 99–110)
CO2 SERPL-SCNC: 27 MMOL/L (ref 21–32)
CREAT SERPL-MCNC: 1.2 MG/DL (ref 0.6–1.2)
DEPRECATED RDW RBC AUTO: 14.8 % (ref 12.4–15.4)
EOSINOPHIL # BLD: 0 K/UL (ref 0–0.6)
EOSINOPHIL NFR BLD: 0 %
GFR SERPLBLD CREATININE-BSD FMLA CKD-EPI: 48 ML/MIN/{1.73_M2}
GLUCOSE BLD-MCNC: 130 MG/DL (ref 70–99)
GLUCOSE BLD-MCNC: 199 MG/DL (ref 70–99)
GLUCOSE BLD-MCNC: 226 MG/DL (ref 70–99)
GLUCOSE BLD-MCNC: 233 MG/DL (ref 70–99)
GLUCOSE BLD-MCNC: 296 MG/DL (ref 70–99)
GLUCOSE BLD-MCNC: 72 MG/DL (ref 70–99)
GLUCOSE SERPL-MCNC: 181 MG/DL (ref 70–99)
HCT VFR BLD AUTO: 25.8 % (ref 36–48)
HGB BLD-MCNC: 8.8 G/DL (ref 12–16)
INR PPP: 1.17 (ref 0.84–1.16)
LYMPHOCYTES # BLD: 0.4 K/UL (ref 1–5.1)
LYMPHOCYTES NFR BLD: 5.9 %
MAGNESIUM SERPL-MCNC: 1.8 MG/DL (ref 1.8–2.4)
MCH RBC QN AUTO: 30.2 PG (ref 26–34)
MCHC RBC AUTO-ENTMCNC: 34.1 G/DL (ref 31–36)
MCV RBC AUTO: 88.5 FL (ref 80–100)
MONOCYTES # BLD: 0.4 K/UL (ref 0–1.3)
MONOCYTES NFR BLD: 5 %
NEUTROPHILS # BLD: 6.2 K/UL (ref 1.7–7.7)
NEUTROPHILS NFR BLD: 88.9 %
PERFORMED ON: ABNORMAL
PERFORMED ON: NORMAL
PLATELET # BLD AUTO: 225 K/UL (ref 135–450)
PMV BLD AUTO: 7.5 FL (ref 5–10.5)
POC ACT LR: 154 SEC
POTASSIUM SERPL-SCNC: 3.7 MMOL/L (ref 3.5–5.1)
PROT SERPL-MCNC: 5.7 G/DL (ref 6.4–8.2)
PROTHROMBIN TIME: 14.9 SEC (ref 11.5–14.8)
RBC # BLD AUTO: 2.92 M/UL (ref 4–5.2)
SODIUM SERPL-SCNC: 138 MMOL/L (ref 136–145)
TROPONIN, HIGH SENSITIVITY: 616 NG/L (ref 0–14)
WBC # BLD AUTO: 7 K/UL (ref 4–11)

## 2023-10-20 PROCEDURE — 2700000000 HC OXYGEN THERAPY PER DAY

## 2023-10-20 PROCEDURE — 94660 CPAP INITIATION&MGMT: CPT

## 2023-10-20 PROCEDURE — 94761 N-INVAS EAR/PLS OXIMETRY MLT: CPT

## 2023-10-20 PROCEDURE — 84484 ASSAY OF TROPONIN QUANT: CPT

## 2023-10-20 PROCEDURE — 2709999900 HC NON-CHARGEABLE SUPPLY

## 2023-10-20 PROCEDURE — 93461 R&L HRT ART/VENTRICLE ANGIO: CPT | Performed by: INTERNAL MEDICINE

## 2023-10-20 PROCEDURE — 6360000002 HC RX W HCPCS

## 2023-10-20 PROCEDURE — B2131ZZ FLUOROSCOPY OF MULTIPLE CORONARY ARTERY BYPASS GRAFTS USING LOW OSMOLAR CONTRAST: ICD-10-PCS | Performed by: INTERNAL MEDICINE

## 2023-10-20 PROCEDURE — 93459 L HRT ART/GRFT ANGIO: CPT

## 2023-10-20 PROCEDURE — 6370000000 HC RX 637 (ALT 250 FOR IP): Performed by: NURSE PRACTITIONER

## 2023-10-20 PROCEDURE — 6370000000 HC RX 637 (ALT 250 FOR IP): Performed by: INTERNAL MEDICINE

## 2023-10-20 PROCEDURE — 94640 AIRWAY INHALATION TREATMENT: CPT

## 2023-10-20 PROCEDURE — 80053 COMPREHEN METABOLIC PANEL: CPT

## 2023-10-20 PROCEDURE — 6360000002 HC RX W HCPCS: Performed by: INTERNAL MEDICINE

## 2023-10-20 PROCEDURE — 99291 CRITICAL CARE FIRST HOUR: CPT | Performed by: INTERNAL MEDICINE

## 2023-10-20 PROCEDURE — 99152 MOD SED SAME PHYS/QHP 5/>YRS: CPT | Performed by: INTERNAL MEDICINE

## 2023-10-20 PROCEDURE — B41F1ZZ FLUOROSCOPY OF RIGHT LOWER EXTREMITY ARTERIES USING LOW OSMOLAR CONTRAST: ICD-10-PCS | Performed by: INTERNAL MEDICINE

## 2023-10-20 PROCEDURE — 85610 PROTHROMBIN TIME: CPT

## 2023-10-20 PROCEDURE — 93461 R&L HRT ART/VENTRICLE ANGIO: CPT

## 2023-10-20 PROCEDURE — C1887 CATHETER, GUIDING: HCPCS | Performed by: INTERNAL MEDICINE

## 2023-10-20 PROCEDURE — B2151ZZ FLUOROSCOPY OF LEFT HEART USING LOW OSMOLAR CONTRAST: ICD-10-PCS | Performed by: INTERNAL MEDICINE

## 2023-10-20 PROCEDURE — 2000000000 HC ICU R&B

## 2023-10-20 PROCEDURE — 83735 ASSAY OF MAGNESIUM: CPT

## 2023-10-20 PROCEDURE — 85347 COAGULATION TIME ACTIVATED: CPT

## 2023-10-20 PROCEDURE — B5171ZZ FLUOROSCOPY OF LEFT SUBCLAVIAN VEIN USING LOW OSMOLAR CONTRAST: ICD-10-PCS | Performed by: INTERNAL MEDICINE

## 2023-10-20 PROCEDURE — 2500000003 HC RX 250 WO HCPCS

## 2023-10-20 PROCEDURE — 6370000000 HC RX 637 (ALT 250 FOR IP): Performed by: STUDENT IN AN ORGANIZED HEALTH CARE EDUCATION/TRAINING PROGRAM

## 2023-10-20 PROCEDURE — 4A023N8 MEASUREMENT OF CARDIAC SAMPLING AND PRESSURE, BILATERAL, PERCUTANEOUS APPROACH: ICD-10-PCS | Performed by: INTERNAL MEDICINE

## 2023-10-20 PROCEDURE — 2580000003 HC RX 258: Performed by: INTERNAL MEDICINE

## 2023-10-20 PROCEDURE — 85520 HEPARIN ASSAY: CPT

## 2023-10-20 PROCEDURE — 85025 COMPLETE CBC W/AUTO DIFF WBC: CPT

## 2023-10-20 PROCEDURE — 92526 ORAL FUNCTION THERAPY: CPT

## 2023-10-20 PROCEDURE — C1769 GUIDE WIRE: HCPCS | Performed by: INTERNAL MEDICINE

## 2023-10-20 PROCEDURE — 92610 EVALUATE SWALLOWING FUNCTION: CPT

## 2023-10-20 PROCEDURE — B2111ZZ FLUOROSCOPY OF MULTIPLE CORONARY ARTERIES USING LOW OSMOLAR CONTRAST: ICD-10-PCS | Performed by: INTERNAL MEDICINE

## 2023-10-20 PROCEDURE — C1894 INTRO/SHEATH, NON-LASER: HCPCS | Performed by: INTERNAL MEDICINE

## 2023-10-20 PROCEDURE — 2709999900 HC NON-CHARGEABLE SUPPLY: Performed by: INTERNAL MEDICINE

## 2023-10-20 RX ORDER — FENTANYL CITRATE 50 UG/ML
INJECTION, SOLUTION INTRAMUSCULAR; INTRAVENOUS
Status: COMPLETED | OUTPATIENT
Start: 2023-10-20 | End: 2023-10-20

## 2023-10-20 RX ORDER — HONEY 100 %
PASTE (ML) TOPICAL DAILY
Status: DISCONTINUED | OUTPATIENT
Start: 2023-10-20 | End: 2023-10-28 | Stop reason: HOSPADM

## 2023-10-20 RX ORDER — LORAZEPAM 0.5 MG/1
0.5 TABLET ORAL
Status: ACTIVE | OUTPATIENT
Start: 2023-10-20 | End: 2023-10-21

## 2023-10-20 RX ORDER — SODIUM CHLORIDE 0.9 % (FLUSH) 0.9 %
5-40 SYRINGE (ML) INJECTION EVERY 12 HOURS SCHEDULED
Status: DISCONTINUED | OUTPATIENT
Start: 2023-10-20 | End: 2023-10-28 | Stop reason: HOSPADM

## 2023-10-20 RX ORDER — MIDAZOLAM HYDROCHLORIDE 1 MG/ML
INJECTION INTRAMUSCULAR; INTRAVENOUS
Status: COMPLETED | OUTPATIENT
Start: 2023-10-20 | End: 2023-10-20

## 2023-10-20 RX ORDER — SODIUM CHLORIDE 9 MG/ML
INJECTION, SOLUTION INTRAVENOUS PRN
Status: DISCONTINUED | OUTPATIENT
Start: 2023-10-20 | End: 2023-10-20 | Stop reason: SDUPTHER

## 2023-10-20 RX ORDER — ACETAMINOPHEN 325 MG/1
650 TABLET ORAL EVERY 4 HOURS PRN
Status: DISCONTINUED | OUTPATIENT
Start: 2023-10-20 | End: 2023-10-28 | Stop reason: HOSPADM

## 2023-10-20 RX ORDER — ONDANSETRON 2 MG/ML
4 INJECTION INTRAMUSCULAR; INTRAVENOUS EVERY 6 HOURS PRN
Status: DISCONTINUED | OUTPATIENT
Start: 2023-10-20 | End: 2023-10-23

## 2023-10-20 RX ORDER — SODIUM CHLORIDE 0.9 % (FLUSH) 0.9 %
5-40 SYRINGE (ML) INJECTION PRN
Status: DISCONTINUED | OUTPATIENT
Start: 2023-10-20 | End: 2023-10-28 | Stop reason: HOSPADM

## 2023-10-20 RX ORDER — SODIUM CHLORIDE 0.9 % (FLUSH) 0.9 %
5-40 SYRINGE (ML) INJECTION EVERY 12 HOURS SCHEDULED
Status: DISCONTINUED | OUTPATIENT
Start: 2023-10-20 | End: 2023-10-20 | Stop reason: SDUPTHER

## 2023-10-20 RX ORDER — MIDODRINE HYDROCHLORIDE 5 MG/1
2.5 TABLET ORAL ONCE
Status: COMPLETED | OUTPATIENT
Start: 2023-10-20 | End: 2023-10-20

## 2023-10-20 RX ORDER — SODIUM CHLORIDE 0.9 % (FLUSH) 0.9 %
5-40 SYRINGE (ML) INJECTION PRN
Status: DISCONTINUED | OUTPATIENT
Start: 2023-10-20 | End: 2023-10-20 | Stop reason: SDUPTHER

## 2023-10-20 RX ORDER — SODIUM CHLORIDE 9 MG/ML
INJECTION, SOLUTION INTRAVENOUS PRN
Status: DISCONTINUED | OUTPATIENT
Start: 2023-10-20 | End: 2023-10-28 | Stop reason: HOSPADM

## 2023-10-20 RX ORDER — FUROSEMIDE 10 MG/ML
40 INJECTION INTRAMUSCULAR; INTRAVENOUS 2 TIMES DAILY
Status: DISCONTINUED | OUTPATIENT
Start: 2023-10-20 | End: 2023-10-22

## 2023-10-20 RX ADMIN — MIDAZOLAM HYDROCHLORIDE 1 MG: 1 INJECTION INTRAMUSCULAR; INTRAVENOUS at 12:16

## 2023-10-20 RX ADMIN — GABAPENTIN 300 MG: 300 CAPSULE ORAL at 21:16

## 2023-10-20 RX ADMIN — HYDROCORTISONE SODIUM SUCCINATE 50 MG: 100 INJECTION, POWDER, FOR SOLUTION INTRAMUSCULAR; INTRAVENOUS at 17:39

## 2023-10-20 RX ADMIN — IPRATROPIUM BROMIDE AND ALBUTEROL SULFATE 1 DOSE: 2.5; .5 SOLUTION RESPIRATORY (INHALATION) at 16:34

## 2023-10-20 RX ADMIN — METOPROLOL TARTRATE 12.5 MG: 25 TABLET, FILM COATED ORAL at 09:36

## 2023-10-20 RX ADMIN — BUDESONIDE 500 MCG: 0.5 SUSPENSION RESPIRATORY (INHALATION) at 20:37

## 2023-10-20 RX ADMIN — BUDESONIDE 500 MCG: 0.5 SUSPENSION RESPIRATORY (INHALATION) at 08:34

## 2023-10-20 RX ADMIN — FUROSEMIDE 40 MG: 10 INJECTION, SOLUTION INTRAMUSCULAR; INTRAVENOUS at 17:39

## 2023-10-20 RX ADMIN — INSULIN LISPRO 4 UNITS: 100 INJECTION, SOLUTION INTRAVENOUS; SUBCUTANEOUS at 17:39

## 2023-10-20 RX ADMIN — Medication 10 ML: at 21:18

## 2023-10-20 RX ADMIN — MIDODRINE HYDROCHLORIDE 2.5 MG: 5 TABLET ORAL at 02:26

## 2023-10-20 RX ADMIN — DULOXETINE HYDROCHLORIDE 60 MG: 60 CAPSULE, DELAYED RELEASE ORAL at 21:17

## 2023-10-20 RX ADMIN — FENTANYL CITRATE 12.5 MCG: 50 INJECTION, SOLUTION INTRAMUSCULAR; INTRAVENOUS at 11:37

## 2023-10-20 RX ADMIN — HYDROCORTISONE SODIUM SUCCINATE 50 MG: 100 INJECTION, POWDER, FOR SOLUTION INTRAMUSCULAR; INTRAVENOUS at 13:57

## 2023-10-20 RX ADMIN — ARFORMOTEROL TARTRATE 15 MCG: 15 SOLUTION RESPIRATORY (INHALATION) at 08:34

## 2023-10-20 RX ADMIN — MUPIROCIN: 20 OINTMENT TOPICAL at 09:39

## 2023-10-20 RX ADMIN — HYDROCORTISONE SODIUM SUCCINATE 50 MG: 100 INJECTION, POWDER, FOR SOLUTION INTRAMUSCULAR; INTRAVENOUS at 06:26

## 2023-10-20 RX ADMIN — OXYCODONE HYDROCHLORIDE 5 MG: 5 TABLET ORAL at 19:00

## 2023-10-20 RX ADMIN — Medication: at 21:17

## 2023-10-20 RX ADMIN — FENTANYL CITRATE 12.5 MCG: 50 INJECTION, SOLUTION INTRAMUSCULAR; INTRAVENOUS at 11:22

## 2023-10-20 RX ADMIN — INSULIN GLARGINE 30 UNITS: 100 INJECTION, SOLUTION SUBCUTANEOUS at 21:19

## 2023-10-20 RX ADMIN — HYDROCORTISONE SODIUM SUCCINATE 50 MG: 100 INJECTION, POWDER, FOR SOLUTION INTRAMUSCULAR; INTRAVENOUS at 00:17

## 2023-10-20 RX ADMIN — ATORVASTATIN CALCIUM 40 MG: 40 TABLET, FILM COATED ORAL at 09:36

## 2023-10-20 RX ADMIN — IPRATROPIUM BROMIDE AND ALBUTEROL SULFATE 1 DOSE: 2.5; .5 SOLUTION RESPIRATORY (INHALATION) at 08:34

## 2023-10-20 RX ADMIN — MIDAZOLAM HYDROCHLORIDE 1 MG: 1 INJECTION INTRAMUSCULAR; INTRAVENOUS at 11:44

## 2023-10-20 RX ADMIN — INSULIN LISPRO 8 UNITS: 100 INJECTION, SOLUTION INTRAVENOUS; SUBCUTANEOUS at 13:57

## 2023-10-20 RX ADMIN — MUPIROCIN: 20 OINTMENT TOPICAL at 21:19

## 2023-10-20 RX ADMIN — CETIRIZINE HYDROCHLORIDE 10 MG: 10 TABLET, FILM COATED ORAL at 09:36

## 2023-10-20 RX ADMIN — MORPHINE SULFATE 15 MG: 15 TABLET, FILM COATED, EXTENDED RELEASE ORAL at 21:17

## 2023-10-20 RX ADMIN — IPRATROPIUM BROMIDE AND ALBUTEROL SULFATE 1 DOSE: 2.5; .5 SOLUTION RESPIRATORY (INHALATION) at 20:37

## 2023-10-20 RX ADMIN — INSULIN GLARGINE 30 UNITS: 100 INJECTION, SOLUTION SUBCUTANEOUS at 09:39

## 2023-10-20 RX ADMIN — FENTANYL CITRATE 25 MCG: 50 INJECTION, SOLUTION INTRAMUSCULAR; INTRAVENOUS at 12:04

## 2023-10-20 RX ADMIN — CLOPIDOGREL BISULFATE 75 MG: 75 TABLET ORAL at 10:25

## 2023-10-20 RX ADMIN — MORPHINE SULFATE 15 MG: 15 TABLET, FILM COATED, EXTENDED RELEASE ORAL at 09:36

## 2023-10-20 RX ADMIN — FENTANYL CITRATE 25 MCG: 50 INJECTION, SOLUTION INTRAMUSCULAR; INTRAVENOUS at 12:16

## 2023-10-20 RX ADMIN — MORPHINE SULFATE 2 MG: 2 INJECTION, SOLUTION INTRAMUSCULAR; INTRAVENOUS at 17:39

## 2023-10-20 RX ADMIN — HYDROCORTISONE SODIUM SUCCINATE 100 MG: 100 INJECTION, POWDER, FOR SOLUTION INTRAMUSCULAR; INTRAVENOUS at 10:39

## 2023-10-20 RX ADMIN — MORPHINE SULFATE 2 MG: 2 INJECTION, SOLUTION INTRAMUSCULAR; INTRAVENOUS at 15:07

## 2023-10-20 RX ADMIN — FENTANYL CITRATE 25 MCG: 50 INJECTION, SOLUTION INTRAMUSCULAR; INTRAVENOUS at 11:44

## 2023-10-20 RX ADMIN — ASPIRIN 81 MG: 81 TABLET, CHEWABLE ORAL at 09:38

## 2023-10-20 RX ADMIN — MIDAZOLAM HYDROCHLORIDE 0.5 MG: 1 INJECTION INTRAMUSCULAR; INTRAVENOUS at 11:22

## 2023-10-20 RX ADMIN — METOPROLOL TARTRATE 12.5 MG: 25 TABLET, FILM COATED ORAL at 21:17

## 2023-10-20 RX ADMIN — MIDAZOLAM HYDROCHLORIDE 0.5 MG: 1 INJECTION INTRAMUSCULAR; INTRAVENOUS at 12:09

## 2023-10-20 RX ADMIN — Medication 10 ML: at 09:38

## 2023-10-20 RX ADMIN — HEPARIN SODIUM 1000 UNITS/HR: 10000 INJECTION, SOLUTION INTRAVENOUS at 06:23

## 2023-10-20 RX ADMIN — ARFORMOTEROL TARTRATE 15 MCG: 15 SOLUTION RESPIRATORY (INHALATION) at 20:37

## 2023-10-20 RX ADMIN — GABAPENTIN 300 MG: 300 CAPSULE ORAL at 09:35

## 2023-10-20 RX ADMIN — ROFLUMILAST 500 MCG: 500 TABLET ORAL at 09:35

## 2023-10-20 RX ADMIN — FUROSEMIDE 40 MG: 10 INJECTION, SOLUTION INTRAMUSCULAR; INTRAVENOUS at 13:56

## 2023-10-20 RX ADMIN — PANTOPRAZOLE SODIUM 40 MG: 40 TABLET, DELAYED RELEASE ORAL at 06:26

## 2023-10-20 RX ADMIN — DULOXETINE HYDROCHLORIDE 60 MG: 60 CAPSULE, DELAYED RELEASE ORAL at 09:35

## 2023-10-20 ASSESSMENT — PAIN - FUNCTIONAL ASSESSMENT
PAIN_FUNCTIONAL_ASSESSMENT: PREVENTS OR INTERFERES SOME ACTIVE ACTIVITIES AND ADLS
PAIN_FUNCTIONAL_ASSESSMENT: ACTIVITIES ARE NOT PREVENTED
PAIN_FUNCTIONAL_ASSESSMENT: ACTIVITIES ARE NOT PREVENTED
PAIN_FUNCTIONAL_ASSESSMENT: PREVENTS OR INTERFERES SOME ACTIVE ACTIVITIES AND ADLS

## 2023-10-20 ASSESSMENT — PAIN SCALES - GENERAL
PAINLEVEL_OUTOF10: 0
PAINLEVEL_OUTOF10: 10
PAINLEVEL_OUTOF10: 0
PAINLEVEL_OUTOF10: 10
PAINLEVEL_OUTOF10: 0
PAINLEVEL_OUTOF10: 0

## 2023-10-20 ASSESSMENT — PAIN DESCRIPTION - LOCATION
LOCATION: CHEST;BACK
LOCATION: BACK
LOCATION: GENERALIZED
LOCATION: CHEST;BACK

## 2023-10-20 ASSESSMENT — PAIN DESCRIPTION - DESCRIPTORS
DESCRIPTORS: ACHING
DESCRIPTORS: ACHING
DESCRIPTORS: ACHING;CRAMPING
DESCRIPTORS: ACHING

## 2023-10-20 NOTE — PLAN OF CARE
SLP completed evaluation. Please refer to notes in EMR. Thank you,   Chrissy BUENO  [address] [place] [address]   Speech-Language Pathologist

## 2023-10-20 NOTE — PROGRESS NOTES
Pt is currently being transported to cath lab for angiogram. Dr. Soren Blanco came to bedside to explain procedure and answer questions. Consent was signed.

## 2023-10-20 NOTE — PROGRESS NOTES
Orders and additional information faxed to [phone] [facility] for life vest.  [employer] representative Jen Ariasar [phone] notified of need for life vest.

## 2023-10-20 NOTE — PROGRESS NOTES
V2.0    [facility] Progress Note      Name:  Tomy Godoy /Age/Sex: 1950  (68 y.o. female)   MRN & CSN:  [id number] & [id number] Encounter Date/Time: 10/20/2023 12:00 PM EDT   Location:  0228/0228-01 PCP: Morena Myles MD     Attending:Khari Moreno MD       Hospital Day: 2    Assessment and Recommendations   Tomy Godoy is a 68 y.o. female with pmh of hypertension, CAD (CABG), BIMAL clip, cardiomyopathy (EF has improved from 30% to 50%), COPD/bronchiectasis, tracheomalacia, obstructive sleep apnea, diabetes type 2, rheumatoid arthritis (on prednisone 4 mg daily), CKD 3 and chronic nonhealing stage IV pressure ulcer admitted and [facility] on 10/1640 follows:  1. Nonhealing stage IV pressure ulcer-MRI consistent with osteomyelitis. ID consulted-continue wound care, no need for antibiotics for chronic wound. 2.  Hypotension: Status post pressors. This is due to adrenal crisis. Currently on IV hydrocortisone. 3.  Bronchiectasis: Critical care/pulmonology did bronchoscopy. BAL 10/13 grew Serratia. Started on Levaquin. 4.  Elevated troponin: Started on heparin drip and transferred here for cardiology evaluation. However, patient went into V-fib cardiac arrest when patient arrived here on 10/18 requiring CPR only. ROSC attained less than 2 minutes. 1.  V-fib cardiac arrest: Status post amiodarone drip. Cardiology consult appreciated. Possible non-ST elevation MI.  CT scan of the head-no acute infarct. 2.  Non-ST elevation MI: Continue heparin drip. Status post nitroglycerin drip. On metoprolol, aspirin, Plavix and Lipitor. For cardiac catheterization today. 3.  Cardiomyopathy: Echo 10/19-EF 30% with regional wall motion abnormality. On IV Lasix. Hold off Entresto for now because of recent hypotension. Continue metoprolol. LifeVest as outpatient. 4.  Hypotension: Status post pressors. This is due to adrenal crisis (patient on prednisone 4 mg daily at home). mass effect or midline shift. No abnormal extra-axial fluid collection. The gray-white differentiation is maintained without evidence of an acute infarct. There is no evidence of hydrocephalus. ORBITS: The visualized portion of the orbits demonstrate no acute abnormality. SINUSES: The visualized paranasal sinuses and mastoid air cells demonstrate no acute abnormality. SOFT TISSUES/SKULL:  No acute abnormality of the visualized skull or soft tissues. No acute intracranial abnormality. MRI SACRUM COCCYX WO CONTRAST    Result Date: 10/16/2023  EXAMINATION: MRI OF THE SACRUM/SI JOINT WITHOUT CONTRAST, 10/16/2023 4:28 pm TECHNIQUE: Multiplanar multisequence MRI of the sacrum/si joint was performed without the administration of intravenous contrast. COMPARISON: Sacrum/coccyx plain radiographs from 10/10/2023 HISTORY: ORDERING SYSTEM PROVIDED HISTORY: Pressure ulcer of coccygeal region, stage 4 ([address]) TECHNOLOGIST PROVIDED HISTORY: Reason for exam:->stage 4 sacral pressure ulcer, increased pain and drainage, suspect osteomyelitis, XR negative 79-year-old female with pressure ulcer of the coccygeal region, stage IV; suspect osteomyelitis FINDINGS: SOFT TISSUES: Ulceration and gas containing sinus tract superficial to the lower coccyx on sagittal image 21, series 5, and image 21, series 4. Moderate edema and fluid in the subcutaneous fat along the midline posterior back. Severe atrophy and fatty degeneration of the paraspinal musculature. BONE MARROW: Subjacent to the ulceration and gas containing sinus tract, there is mild subjacent hyperintense STIR signal and low T1 signal on image 19, series 4, and series 5, within the mid coccyx also seen on image 14, series 9, and series 8, concerning for early osteomyelitis. Visualized sacral foramen are grossly unremarkable. Probable remote compression deformity of L5. Mild to moderate degenerative changes in the lower lumbar/lumbosacral spine.   No significant marrow

## 2023-10-20 NOTE — PROGRESS NOTES
Speech Language Pathology  Clinical Bedside Swallow Assessment  Facility/Department: [facility] C2 CARD TELEMETRY        Recommendations:  Diet recommendation: IDDSI 6 Soft and Bite Size; IDDSI 0 Thin (small single sips); meds in puree   Instrumentation: FEES is recommended to further evaluate pharyngeal phase and mechanisms (e.g. vocal folds, UES, reflux, etc)  Risk management: upright for all intake, stay upright for at least 30 mins after intake, small bites/sips, assist feed, 1:1 supervision with intake, oral care q4 hrs to reduce adverse affects in the event of aspiration, alternate bites/sips, slow rate of intake, general GERD precautions, STRICT aspiration precautions, and hold PO and contact SLP if s/s of aspiration or worsening respiratory status develop. *Recommend FEES to further assess pharyngeal phase and rule out aspiration- perfect serve sent to MD, order placed  -Noted SLP spoke with RN after receiving order for FEES- pt currently NPO for procedure and will likely not be able to complete FEES until Monday 10/23/23. SLP to follow. *Recommend close diet monitoring with STRICT aspiration precautions  *Recommend holding PO and consulting SLP if s/s aspiration or worsening respiratory status develop    NAME:Gris Taveras  : 1950 (74 y.o.)   MRN: [id number]  ROOM: [address] [place] [address]  ADMISSION DATE: 10/19/2023  PATIENT DIAGNOSIS(ES): NSTEMI (non-ST elevated myocardial infarction) ([address]) [I21.4]  No chief complaint on file.     Patient Active Problem List    Diagnosis Date Noted    Acute kidney injury superimposed on CKD ([address]) 2023    NSTEMI (non-ST elevated myocardial infarction) ([address]) 10/19/2023    Ischemic cardiomyopathy 3101    Acute metabolic encephalopathy     Nausea 10/18/2023    Acute adrenal crisis ([address]) 10/17/2023    Hypotension 10/17/2023    Suspected chronic osteomyelitis of sacrum ([address]) 10/16/2023    Uncontrolled type 2 diabetes mellitus with hyperglycemia ([address]) Recent Labs     10/20/23  0635   WBC 7.0   HGB 8.8*         BMP:  Recent Labs     10/20/23  0635      K 3.7      CO2 27   BUN 18   CREATININE 1.2   GLUCOSE 181*          Cranial nerve exam: Pt reports having \"jaw surgery\" previously, with R facial droop noted at baseline. Reports reduced sensation on L side- reports L side is where surgery took place. CN V (trigeminal): ophthalmic, maxillary, and mandibular facial sensation- Impaired L  CN VII (facial): Impaired R and Impaired L  CN IX/X (glossopharyngeal/vagus): pitch range: WNL; vocal quality: Adequate; cough: Congested  CN XII (hypoglossal): Impaired R    Laryngeal function exam:   Secretions: appears to manage  Vocal quality: See CN exam above  Pitch range: See CN exam above  Cough: See CN exam above    Oral Care Status:    [] Oral Care [facility]  [] Poor oral care status  [x] Edentulous  [x] Upper Dentures  [] Lower Dentures  [] Missing/Broken Teeth  [] Evidence of dental cavities/carries    PO trials:    Ice Chips: x5; no overt s/s aspiration demonstrated; respiratory rate fluctuated from 20-30; 02 sats stable     IDDSI 0 (thin): assessed via tsp, cup edge, and straw; pt presents with adequate laryngeal elevation upon palpation; no overt s/s aspiration across thin trials; RR fluctuated from 18-26; 02 sats relatively stable at %    IDDSI 2 (mildly thick): DNT    IDDSI 3 (moderately thick): DNT    IDDSI 4 (puree): reduced mandibular coordination but adequate oral manipulation and clearance; no overt s/s aspiration    IDDSI 5 (minced and moist): DNT    IDDSI 6 (soft and bite sized): slightly prolonged but effective mastication; reduced mandibular coordination but functional oral clearance; suspect timely swallow initiation; no overt s/s aspiration    IDDSI 7 (regular): DNT    3 oz water: PASS      Dysphagia Diagnosis: Mild oral stage dysphagia and Mild pharyngeal stage dysphagia         Impressions:  Pt presents partially upright in bed,

## 2023-10-20 NOTE — CONSULTS
[facility] Nurse  Consult Note       NAME:  Valorie Martinez  MEDICAL RECORD NUMBER:  [id number]  AGE: 68 y.o. GENDER: female  : 1950  TODAY'S DATE:  10/20/2023    Subjective; It's O.k. I can stay turned. Reason for WOCN Evaluation and Assessment: Chronic sacral wound      Valorie Martinez is a 68 y.o. female referred by:   [] Physician  [x] Nursing  [] Other:     Wound Identification:  Wound Type: pressure  Contributing Factors: chronic pressure, decreased mobility, shear force, and obesity    Wound History: 68 y.o. female. She was transferred from the [email or web address] inpatient service. She presented to the [facility] ER the evening of 10/16 for altered mental status     chronic non-healing stage 4 sacral pressure ulcer. She had been following up in wound clinic with Dr. Wood Ruiz.     Current Wound Care Treatment:  alginate foam    Patient Goal of Care:  [x] Wound Healing  [] Odor Control  [] Palliative Care  [x] Pain Control   [] Other:         PAST MEDICAL HISTORY        Diagnosis Date    Acute on chronic diastolic heart failure due to coronary artery disease ([facility])     Acute respiratory failure with hypoxia ([facility])     Atherosclerosis of native artery of right lower extremity with rest pain ([address]) 2017    Back pain     Branch retinal vein occlusion 2012    Bronchiectasis with acute exacerbation ([address])     Candidal dermatitis 2023    Cellulitis and abscess of left leg 2021    Cellulitis of left lower extremity 2021    S/P vein harvest 2021 for CABG    CHF (congestive heart failure) ([facility])     Closed compression fracture of thoracic vertebra ([address]) 01/15/2020    Closed fracture of facial bone with routine healing 2016    Closed jaw fracture ([address]) 01/15/2020    Community acquired pneumonia of left lower lobe of lung     Compression fracture of L1 lumbar vertebra ([address]) 01/15/2020    COPD (chronic obstructive pulmonary disease) ([facility])     COPD exacerbation Assessment; sacral wound muscle , red, moist   Sven Risk Score: Sven Scale Score: 15    Patient Active Problem List   Diagnosis    Chronic cough    Rheumatoid arthritis (HCC)    Psoriasis    GERD (gastroesophageal reflux disease)    History of tobacco abuse    Diabetes mellitus ([address])    Anemia    Bronchiectasis with acute lower respiratory infection ([address])    Tracheobronchomalacia    Immunocompromised state ([address])    Coronary artery disease    Bilateral lower extremity edema    Essential hypertension    Lumbar spondylosis    Mitral valve insufficiency and aortic valve insufficiency    Mixed hyperlipidemia    Myopia of both eyes    Osteoporosis    Other chronic sinusitis    Primary open angle glaucoma (POAG) of both eyes, mild stage    Primary osteoarthritis of right hip    Type 2 diabetes mellitus with unspecified diabetic retinopathy without macular edema ([facility])    Type 2 diabetes mellitus with diabetic peripheral angiopathy without gangrene, with long-term current use of insulin ([facility])    Pressure ulcer of coccygeal region, stage 4 ([facility])    Aortocoronary bypass status    Mild malnutrition ([facility])    Chronic systolic (congestive) heart failure ([facility])    Nonrheumatic mitral (valve) insufficiency    Venous insufficiency of left lower extremity    Acute kidney injury superimposed on CKD ([address])    Uncontrolled type 2 diabetes mellitus with hyperglycemia ([address])    Suspected chronic osteomyelitis of sacrum ([facility])    Acute adrenal crisis ([address])    Hypotension    Acute metabolic encephalopathy    Nausea    NSTEMI (non-ST elevated myocardial infarction) ([address])    Ischemic cardiomyopathy       Measurements:  Wound 12/18/20 #2, Sacrum, Pressure Injury, Stage 4, Onset 5/2020 (Active)   Wound Image   10/20/23 0937   Wound Etiology Pressure Stage 4 10/20/23 0937   Dressing Status New dressing applied 10/20/23 0937   Wound Cleansed Cleansed with saline 10/20/23 0937   Dressing/Treatment Zinc paste; Alginate with Ag;Betadine swabs/povidone iodine; Foam 10/20/23 0937   Offloading for Diabetic Foot Ulcers Offloading ordered 10/20/23 0937   Dressing Change Due 10/21/23 10/20/23 0937   Wound Length (cm) 2.5 cm 10/20/23 0937   Wound Width (cm) 1.5 cm 10/20/23 0937   Wound Depth (cm) 3.2 cm 10/20/23 0937   Wound Surface Area (cm^2) 3.75 cm^2 10/20/23 0937   Change in Wound Size % (l*w) -2400 10/20/23 0937   Wound Volume (cm^3) 12 cm^3 10/20/23 0937   Wound Healing % -43037 10/20/23 0937   Distance Tunneling (cm) 3.2 cm 10/20/23 0937   Tunneling Position ___ O'Clock posterior 10/20/23 0937   Undermining Starts ___ O'Clock 6:00 10/20/23 0937   Undermining Ends___ O'Clock 12 10/19/23 0800   Undermining Maxium Distance (cm) 3.2 10/20/23 0937   Wound Assessment Pink/red 10/20/23 0937   Drainage Amount Scant (moist but unmeasurable) 10/20/23 0937   Drainage Description Serosanguinous 10/20/23 0937   Odor None 10/20/23 0937   Maegan-wound Assessment Maceration 10/20/23 0937   Margins Unattached edges 10/20/23 0937   Wound Thickness Description not for Pressure Injury Full thickness 10/20/23 0937   Number of days: 1035              Response to treatment:  Well tolerated by patient. Pain Assessment:  Severity:  0 / 10  Quality of pain: N/A  Wound Pain Timing/Severity: none  Premedicated: No    Plan   Plan of Care: Wound 12/18/20 #2, Sacrum, Pressure Injury, Stage 4, Onset 5/2020-Dressing/Treatment: Zinc paste, Alginate with Ag, Betadine swabs/povidone iodine, Foam  First chg has zinc please use Triad maegan wound forward. Per Dr. Liao Other notes; Sacral Wound:  Generous amount of Betadine maegan wound  Triad maegan wound  Opticell Ag rope into wound - please give patient remainder today   Cover with Mepilex Border  Change daily or as needed for drainage    Specialty Bed Required : Yes   [x] Low Air Loss   [x] Pressure Redistribution  [] Fluid Immersion  [] Bariatric  [] Total Pressure Relief  [] Other:     Current Diet: Diet NPO Exceptions are:  Ice

## 2023-10-20 NOTE — PROGRESS NOTES
Jordan Cornejo NOTE    MD contacted for hypotension SBP in the 80s. Received 250cc bolus by Cards earlier w.o improvement. Chart reviewed. EF 40%.   Per RN on 4lnc earlier, currently on qhs bipap  Had metop earlier  On opioids ATC for RA  On IV hydrocortisone ATC, due to concern for adrenal crisis  Entresto already on hold    [place]et[place] not available in this hospital    Decreased metop to 12.5mg  Give midodrine 2.5mg x1      Tanisha MD [place]

## 2023-10-20 NOTE — PROGRESS NOTES
10/20/23 0018   NIV Type   $NIV $Daily Charge   NIV Started/Stopped On   Equipment Type v60   Mode Bilevel   Mask Type Full face mask   Mask Size Medium   Assessment   Pulse 82   Respirations 23   SpO2 100 %   Settings/Measurements   PIP Observed 12 cm H20   IPAP 12 cmH20   CPAP/EPAP 6 cmH2O   Vt (Measured) 648 mL   Rate Ordered 10   FiO2  40 %   I Time/ I Time % 1 s   Minute Volume (L/min) 10.1 Liters   Mask Leak (lpm) 10 lpm   Patient's Home Machine No   Alarm Settings   Alarms On Y   Low Pressure (cmH2O) 6 cmH2O   High Pressure (cmH2O) 30 cmH2O   Delay Alarm 20 sec(s)   Apnea (secs) 20 secs   RR Low (bpm) 6   RR High (bpm) 40 br/min

## 2023-10-20 NOTE — PLAN OF CARE
Problem: Safety - Adult  Goal: Free from fall injury  10/19/2023 2032 by Garfield Curtis RN  Outcome: Progressing  10/19/2023 0715 by Andreas Powers RN  Outcome: Progressing     Problem: Chronic Conditions and Co-morbidities  Goal: Patient's chronic conditions and co-morbidity symptoms are monitored and maintained or improved  10/19/2023 2032 by Garfield Curtis RN  Outcome: Progressing  10/19/2023 0715 by Andreas Powers RN  Outcome: Progressing  Flowsheets (Taken 10/19/2023 0600)  Care Plan - Patient's Chronic Conditions and Co-Morbidity Symptoms are Monitored and Maintained or Improved:   Monitor and assess patient's chronic conditions and comorbid symptoms for stability, deterioration, or improvement   Collaborate with multidisciplinary team to address chronic and comorbid conditions and prevent exacerbation or deterioration   Update acute care plan with appropriate goals if chronic or comorbid symptoms are exacerbated and prevent overall improvement and discharge     Problem: Discharge Planning  Goal: Discharge to home or other facility with appropriate resources  10/19/2023 2032 by Garfield Curtis RN  Outcome: Progressing  10/19/2023 0715 by Andreas Powers RN  Outcome: Progressing  Flowsheets (Taken 10/19/2023 0600)  Discharge to home or other facility with appropriate resources:   Identify barriers to discharge with patient and caregiver   Arrange for needed discharge resources and transportation as appropriate   Identify discharge learning needs (meds, wound care, etc)   Refer to discharge planning if patient needs post-hospital services based on physician order or complex needs related to functional status, cognitive ability or social support system     Problem: Respiratory - Adult  Goal: Achieves optimal ventilation and oxygenation  10/19/2023 2032 by Garfield Curtis RN  Outcome: Progressing  10/19/2023 0715 by Andreas Powers RN  Outcome: Progressing  Flowsheets (Taken 10/19/2023 0600)  Achieves optimal ventilation and oxygenation:   Assess for changes in respiratory status   Assess for changes in mentation and behavior   Position to facilitate oxygenation and minimize respiratory effort   Oxygen supplementation based on oxygen saturation or arterial blood gases   Assess the need for suctioning and aspirate as needed   Respiratory therapy support as indicated   Assess and instruct to report shortness of breath or any respiratory difficulty     Problem: Cardiovascular - Adult  Goal: Maintains optimal cardiac output and hemodynamic stability  10/19/2023 2032 by David Espinosa RN  Outcome: Progressing  10/19/2023 0715 by Andreas June RN  Outcome: Progressing  Flowsheets (Taken 10/19/2023 0600)  Maintains optimal cardiac output and hemodynamic stability:   Monitor blood pressure and heart rate   Monitor urine output and notify Licensed Independent Practitioner for values outside of normal range   Assess for signs of decreased cardiac output   Administer fluid and/or volume expanders as ordered   Administer vasoactive medications as ordered  Goal: Absence of cardiac dysrhythmias or at baseline  10/19/2023 2032 by David Espinosa RN  Outcome: Progressing  10/19/2023 0715 by Andreas June RN  Outcome: Progressing  Flowsheets (Taken 10/19/2023 0600)  Absence of cardiac dysrhythmias or at baseline:   Monitor cardiac rate and rhythm   Assess for signs of decreased cardiac output   Administer antiarrhythmia medication and electrolyte replacement as ordered     Problem: Skin/Tissue Integrity - Adult  Goal: Incisions, wounds, or drain sites healing without S/S of infection  10/19/2023 2032 by David Espinosa RN  Outcome: Progressing  10/19/2023 0715 by Andreas June RN  Outcome: Progressing  Flowsheets (Taken 10/19/2023 0600)  Incisions, Wounds, or Drain Sites Healing Without Sign and Symptoms of Infection:   ADMISSION and DAILY: Assess and document risk factors for pressure ulcer development   TWICE DAILY: Assess and document skin integrity   TWICE DAILY: Assess and document dressing/incision, wound bed, drain sites and surrounding tissue   Implement wound care per orders     Problem: Genitourinary - Adult  Goal: Urinary catheter remains patent  10/19/2023 2032 by Priscilla Peterson RN  Outcome: Progressing  10/19/2023 0715 by Andreas Barry RN  Outcome: Progressing  Flowsheets (Taken 10/19/2023 0600)  Urinary catheter remains patent: Assess patency of urinary catheter     Problem: Metabolic/Fluid and Electrolytes - Adult  Goal: Glucose maintained within prescribed range  10/19/2023 2032 by Priscilla Peterson RN  Outcome: Progressing  10/19/2023 0715 by Andreas Barry RN  Outcome: Progressing  Flowsheets (Taken 10/19/2023 0600)  Glucose maintained within prescribed range:   Monitor blood glucose as ordered   Assess for signs and symptoms of hyperglycemia and hypoglycemia   Administer ordered medications to maintain glucose within target range     Problem: Nutrition Deficit:  Goal: Optimize nutritional status  Outcome: Progressing  Flowsheets (Taken 10/19/2023 6149 by Jorge Quiroz, MS, RD, LD)  Nutrient intake appropriate for improving, restoring, or maintaining nutritional needs:   Assess nutritional status and recommend course of action   Monitor oral intake, labs, and treatment plans   Recommend appropriate diets, oral nutritional supplements, and vitamin/mineral supplements     Problem: Pain  Goal: Verbalizes/displays adequate comfort level or baseline comfort level  Outcome: Progressing

## 2023-10-20 NOTE — CONSULTS
CARDIAC CATHETERIZATION REPORT    Date of Procedure: 10/20/23  : Amina Khanna DO  Primary Indication: V-fib arrest, NSTEMI, CAD, ischemic cardiomyopathy    Procedures Performed:  1. Coronary angiography  2. Left subclavian angiography  3. LIMA angiography  4. Saphenous vein graft angiography  5. Left heart catheterization  6. Right heart catheterization  7. Ultrasound-guided right femoral artery access  8. Ultrasound-guided left femoral vein access  9. Right femoral angiography  10. Moderate conscious sedation    Procedural Details:  Access: Local anesthetic was given and access was obtained in the right femoral artery using a micropuncture technique and ultrasound guidance and a 6F sheath was placed without difficulty. Access was also obtained in the right femoral vein using a micropuncture technique and ultrasound guidance and a 5F sheath was placed without difficulty. Diagnostic: A 5F Larwance Sumerduck catheter was used to perform the right heart catheterization. 5F JR4 and 5F JL4.5 catheters were used to perform selective right and left coronary angiography, respectively. The 5F JR4 catheter was used to perform saphenous vein graft angiography and left subclavian angiography. A 5F ANDRÉS catheter was used to perform LIMA angiography. The 5F JR4 catheter was used to perform the left heart catheterization. No significant gradient was observed on pull-back of the catheter across the aortic valve. Hemostasis: At the end of the procedure, the right femoral arterial sheath was removed and manual pressure applied to maintain hemostasis. The right femoral venous sheath was removed and manual pressure applied to maintain hemostasis. Findings:  Hemodynamics:  A.  Right heart catheterization                   1. RA: 12 mmHg                   2. RV: 52/12 mmHg                   3. PA: 52/28 (38) mmHg                   4. PCWP: 27 mmHg                   5. Saturations: RA 50%, PA 46%, AO 88%                   6. and ending at 12:26 AM.  Medications: 3 mg IV Versed, 100 mcg IV Fentanyl  Contrast: 170 cc of Isovue     Impression:  1. Severe native three vessel coronary artery disease. The ostial LCx is now  with left-to-left and right-to-left collaterals to an OM branch present. The second diagonal was previously unrevascularized and has disease that appears to have progressed some from the previous angiogram.  2. Patent LIMA to LAD. 3. Patent SVG to RPLB. 4. Occluded SVG to OM. 5. Acute on chronic LV systolic heart failure with severely elevated left-sided cardiac filling pressures and moderately elevated right-sided cardiac filling pressures. 6. Moderate pulmonary hypertension. 7. Preserved [place] cardiac output and index. Plan:  1. Will recommend medical management of CAD at this time. Pending clinical course, could consider possible future complex PCI/rotational atherectomy of distal left main and second diagonal branch. 2. EP following and will likely need ICD for secondary prevention of SCD. 3. Can discontinue IV heparin infusion. 4. Diurese with IV lasix 40 mg BID. 5. Continue aspirin 81 mg daily, Plavix 75 mg daily, and atorvastatin 40 mg daily. 6. If BP remains stable on current dose of beta-blocker, can eventually transition from metoprolol tartrate to metoprolol succinate and restart Entresto to further optimize medical therapy for underlying ischemic cardiomyopathy. 7. Monitor strict I/Os, obtain daily standing weights. 8. 2L per day fluid restriction, low sodium diet. 9. Trend BMP and magnesium levels and replete electrolytes as needed to maintain K>4 and Mg>2.       Daryle Dryer, [facility]

## 2023-10-20 NOTE — PLAN OF CARE
CHF Care Plan      Patient's EF (Ejection Fraction) is less than 40%    Heart Failure Medications:  Diuretics[de-identified] Furosemide    (One of the following REQUIRED for EF </= 40%/SYSTOLIC FAILURE but MAY be used in EF% >40%/DIASTOLIC FAILURE)        ACE[de-identified] None        ARB[de-identified] None         ARNI[de-identified] Sacubitril/Valsartan-Entresto (on hold)    (Beta Blockers)  NON- Evidenced Based Beta Blocker (for EF% >40%/DIASTOLIC FAILURE): Metoprolol TARTrate- Lopressor    Evidenced Based Beta Blocker::(REQUIRED for EF% <40%/SYSTOLIC FAILURE) None  . .................................................................................................................................................. Healthy Weight Tracking - BMI + Meds 10/6/2023 10/10/2023 10/11/2023 10/16/2023 10/18/2023 10/19/2023 10/20/2023   Weight 179 lb 179 lb 182 lb 3.2 oz 185 lb 187 lb 6.3 oz 188 lb 7.9 oz 191 lb 12.8 oz   Height 5' 9\" 5' 9\" 5' 9\" 5' 7\" 5' 7.008\" 5' 7.008\" -   Body Mass Index 26.43 kg/m2 26.43 kg/m2 26.91 kg/m2 28.98 kg/m2 29.35 kg/m2 - 30.03 kg/m2   Some recent data might be hidden         Patient's weights and intake/output reviewed: Yes    Daily Weight log at bedside, patient/family participation in use of log: \"yes    Patient's Last Weight: 87 kg obtained by bed scale. Difference of 1.5 kg more than last documented weight.       Intake/Output Summary (Last 24 hours) at 10/20/2023 1601  Last data filed at 10/20/2023 1325  Gross per 24 hour   Intake 695.24 ml   Output 495 ml   Net 200.24 ml       Education Booklet Provided: yes    Comorbidities Reviewed Yes    Patient has a past medical history of Acute on chronic diastolic heart failure due to coronary artery disease ([address]), Acute respiratory failure with hypoxia ([address]), Atherosclerosis of native artery of right lower extremity with rest pain ([address]), Back pain, Branch retinal vein occlusion, Bronchiectasis with acute exacerbation ([address]), Candidal dermatitis, Cellulitis and abscess of left leg, Cellulitis of left lower extremity, CHF (congestive heart failure) ([facility]), Closed compression fracture of thoracic vertebra (HCC), Closed fracture of facial bone with routine healing, Closed jaw fracture ([address]), Community acquired pneumonia of left lower lobe of lung, Compression fracture of L1 lumbar vertebra (HCC), COPD (chronic obstructive pulmonary disease) ([address]), COPD exacerbation ([address]), COVID, Diabetes mellitus ([address]), Fracture of tibial plateau, closed, left, initial encounter, HCAP (healthcare-associated pneumonia), Minimally displaced zone I fracture of sacrum ([address]), MRSA (methicillin resistant staph aureus) culture positive, MRSA (methicillin resistant Staphylococcus aureus), Mucus plugging of bronchi, NSTEMI (non-ST elevated myocardial infarction) ([address]), Osteomyelitis of mandible, Osteoporosis with pathological fracture, Pneumonia of left lower lobe due to infectious organism, Post herpetic neuralgia, Pressure ulcer of left heel, stage 3 ([address]), Proximal humerus fracture, Rheumatoid arthritis ([address]), Shingles, Sleep apnea, Status post incision and drainage, Surgical wound dehiscence, initial encounter (at LLE SVG harvest site), Temporal arteritis ([address]), Tobacco use, Tracheomalacia, and Vitreous hemorrhage, right eye ([address]). >>For CHF and Comorbidity documentation on Education Time and Topics, please see Education Tab    Progressive Mobility Assessment:  What is this patient's Current Level of Mobility?: Requires Bed Rest  How was this patient Mobilized today?: Unable to Mobilize, ambulated 0 ft                 With Whom? Nurse                 Level of Difficulty/Assistance:  dependent      Pt resting in bed at this time on  4 L O2. Pt with complaints of shortness of breath. Pt with pitting lower extremity edema.      Patient and/or Family's stated Goal of Care this Admission: reduce shortness of breath, increase activity tolerance, better understand heart failure and disease management, be more comfortable, and reduce

## 2023-10-20 NOTE — PROGRESS NOTES
Speech Language Pathology  Attempt Note     Name: Natalie Lopez  : 1950  Medical Diagnosis: NSTEMI (non-ST elevated myocardial infarction) ([address]) [I21.4]      Pt seen this morning for bedside swallow evaluation (BSE) - see full report for details. SLP recommended a FEES to further assess pharyngeal phase of swallow and to r/o aspiration. FEES order received - order acknowledged. FEES unable to be completed this date due to pt now being NPO for a procedure. FEES likely to not be completed until Monday 10/23/23 due to pt being NPO this date and unable to complete instrumentals on the weekend. SLP to f/u with pt at bedside, though. RN aware. No charges.     Thank you,    Colby Mario MA [address] [place] [address]  Speech Language Pathologist

## 2023-10-20 NOTE — PROGRESS NOTES
10/20/23 0344   NIV Type   NIV Started/Stopped On   Equipment Type V60   Mode Bilevel   Mask Type Full face mask   Mask Size Medium   Assessment   Pulse 66   Respirations 25   SpO2 100 %   Comfort Level Good   Using Accessory Muscles Yes   Mask Compliance Good   Skin Assessment Clean, dry, & intact   Skin Protection for O2 Device Yes   Orientation Middle   Location Nose   Intervention(s) Skin Barrier   Breath Sounds   Breath Sounds Bilateral Diminished   Right Upper Lobe Diminished   Right Middle Lobe Diminished   Right Lower Lobe Diminished   Left Upper Lobe Diminished   Left Lower Lobe Diminished   Settings/Measurements   IPAP 12 cmH20   CPAP/EPAP 6 cmH2O   Vt (Measured) 775 mL   Rate Ordered 10   FiO2  35 %   Minute Volume (L/min) 13.1 Liters   Mask Leak (lpm) 14 lpm   Patient's Home Machine No   Alarm Settings   Alarms On Y

## 2023-10-20 NOTE — PROGRESS NOTES
Physical Therapy  Chart reviewed, attempted PT treat, pt off floor for procedure, will follow up as pt condition and schedule allow  No charge  Thank you  Corine Padilla, PT

## 2023-10-20 NOTE — PLAN OF CARE
Problem: Chronic Conditions and Co-morbidities  Goal: Patient's chronic conditions and co-morbidity symptoms are monitored and maintained or improved  Outcome: Progressing  Flowsheets  Taken 10/20/2023 1145  Care Plan - Patient's Chronic Conditions and Co-Morbidity Symptoms are Monitored and Maintained or Improved:   Monitor and assess patient's chronic conditions and comorbid symptoms for stability, deterioration, or improvement   Collaborate with multidisciplinary team to address chronic and comorbid conditions and prevent exacerbation or deterioration   Update acute care plan with appropriate goals if chronic or comorbid symptoms are exacerbated and prevent overall improvement and discharge    Problem: Discharge Planning  Goal: Discharge to home or other facility with appropriate resources  Outcome: Progressing  Flowsheets  Taken 10/20/2023 1145  Discharge to home or other facility with appropriate resources: Refer to discharge planning if patient needs post-hospital services based on physician order or complex needs related to functional status, cognitive ability or social support system    Problem: Respiratory - Adult  Goal: Achieves optimal ventilation and oxygenation  Outcome: Progressing  Flowsheets (Taken 10/20/2023 1145)  Achieves optimal ventilation and oxygenation:   Assess for changes in respiratory status   Assess for changes in mentation and behavior   Position to facilitate oxygenation and minimize respiratory effort   Oxygen supplementation based on oxygen saturation or arterial blood gases   Encourage broncho-pulmonary hygiene including cough, deep breathe, incentive spirometry   Assess the need for suctioning and aspirate as needed   Respiratory therapy support as indicated   Assess and instruct to report shortness of breath or any respiratory difficulty     Problem: Cardiovascular - Adult  Goal: Maintains optimal cardiac output and hemodynamic stability  Outcome: Progressing  Flowsheets (Taken 10/20/2023 1145)  Maintains optimal cardiac output and hemodynamic stability:   Monitor blood pressure and heart rate   Monitor urine output and notify Licensed Independent Practitioner for values outside of normal range   Assess for signs of decreased cardiac output     Problem: Skin/Tissue Integrity - Adult  Goal: Incisions, wounds, or drain sites healing without S/S of infection  Outcome: Progressing  Flowsheets  Taken 10/20/2023 1145  Incisions, Wounds, or Drain Sites Healing Without Sign and Symptoms of Infection: TWICE DAILY: Assess and document skin integrity  Taken 10/20/2023 1144  Incisions, Wounds, or Drain Sites Healing Without Sign and Symptoms of Infection: TWICE DAILY: Assess and document skin integrity     Problem: Genitourinary - Adult  Goal: Urinary catheter remains patent  Outcome: Progressing  Flowsheets (Taken 10/20/2023 1145)  Urinary catheter remains patent: Assess patency of urinary catheter     Problem: Pain  Goal: Verbalizes/displays adequate comfort level or baseline comfort level  Outcome: Progressing  Flowsheets (Taken 10/20/2023 1145)  Verbalizes/displays adequate comfort level or baseline comfort level:   Encourage patient to monitor pain and request assistance   Assess pain using appropriate pain scale   Administer analgesics based on type and severity of pain and evaluate response   Implement non-pharmacological measures as appropriate and evaluate response   Consider cultural and social influences on pain and pain management     Problem: Skin/Tissue Integrity  Goal: Absence of new skin breakdown  Description: 1. Monitor for areas of redness and/or skin breakdown  2. Assess vascular access sites hourly  3. Every 4-6 hours minimum:  Change oxygen saturation probe site  4. Every 4-6 hours:  If on nasal continuous positive airway pressure, respiratory therapy assess nares and determine need for appliance change or resting period.   Outcome: Progressing

## 2023-10-20 NOTE — CARE COORDINATION
Chart reviewed day 1- Pt had cardiac cath today. May go home with Life Vest per cardiology. Pt has used [facility] [place]  and would like to continue with them if she returns home. ALso family verbalized pt could stay with her daughter for 24/7 supervision.

## 2023-10-21 LAB
ALBUMIN SERPL-MCNC: 2.8 G/DL (ref 3.4–5)
ALBUMIN/GLOB SERPL: 1 {RATIO} (ref 1.1–2.2)
ALP SERPL-CCNC: 57 U/L (ref 40–129)
ALT SERPL-CCNC: 10 U/L (ref 10–40)
ANION GAP SERPL CALCULATED.3IONS-SCNC: 7 MMOL/L (ref 3–16)
AST SERPL-CCNC: 16 U/L (ref 15–37)
BASOPHILS # BLD: 0 K/UL (ref 0–0.2)
BASOPHILS NFR BLD: 0.1 %
BILIRUB SERPL-MCNC: 0.3 MG/DL (ref 0–1)
BUN SERPL-MCNC: 19 MG/DL (ref 7–20)
CALCIUM SERPL-MCNC: 9 MG/DL (ref 8.3–10.6)
CHLORIDE SERPL-SCNC: 103 MMOL/L (ref 99–110)
CO2 SERPL-SCNC: 30 MMOL/L (ref 21–32)
CREAT SERPL-MCNC: 1.3 MG/DL (ref 0.6–1.2)
DEPRECATED RDW RBC AUTO: 14.4 % (ref 12.4–15.4)
EOSINOPHIL # BLD: 0 K/UL (ref 0–0.6)
EOSINOPHIL NFR BLD: 0 %
GFR SERPLBLD CREATININE-BSD FMLA CKD-EPI: 44 ML/MIN/{1.73_M2}
GLUCOSE BLD-MCNC: 131 MG/DL (ref 70–99)
GLUCOSE BLD-MCNC: 149 MG/DL (ref 70–99)
GLUCOSE BLD-MCNC: 190 MG/DL (ref 70–99)
GLUCOSE BLD-MCNC: 81 MG/DL (ref 70–99)
GLUCOSE BLD-MCNC: 90 MG/DL (ref 70–99)
GLUCOSE SERPL-MCNC: 86 MG/DL (ref 70–99)
HCT VFR BLD AUTO: 25.1 % (ref 36–48)
HGB BLD-MCNC: 8.6 G/DL (ref 12–16)
INR PPP: 1.08 (ref 0.84–1.16)
LYMPHOCYTES # BLD: 0.5 K/UL (ref 1–5.1)
LYMPHOCYTES NFR BLD: 6.7 %
MAGNESIUM SERPL-MCNC: 1.7 MG/DL (ref 1.8–2.4)
MCH RBC QN AUTO: 30.5 PG (ref 26–34)
MCHC RBC AUTO-ENTMCNC: 34.5 G/DL (ref 31–36)
MCV RBC AUTO: 88.5 FL (ref 80–100)
MONOCYTES # BLD: 0.5 K/UL (ref 0–1.3)
MONOCYTES NFR BLD: 6.7 %
NEUTROPHILS # BLD: 6.2 K/UL (ref 1.7–7.7)
NEUTROPHILS NFR BLD: 86.5 %
PERFORMED ON: ABNORMAL
PERFORMED ON: NORMAL
PERFORMED ON: NORMAL
PLATELET # BLD AUTO: 231 K/UL (ref 135–450)
PMV BLD AUTO: 7.2 FL (ref 5–10.5)
POTASSIUM SERPL-SCNC: 3.2 MMOL/L (ref 3.5–5.1)
PROT SERPL-MCNC: 5.7 G/DL (ref 6.4–8.2)
PROTHROMBIN TIME: 14 SEC (ref 11.5–14.8)
RBC # BLD AUTO: 2.83 M/UL (ref 4–5.2)
SODIUM SERPL-SCNC: 140 MMOL/L (ref 136–145)
WBC # BLD AUTO: 7.2 K/UL (ref 4–11)

## 2023-10-21 PROCEDURE — 2000000000 HC ICU R&B

## 2023-10-21 PROCEDURE — 6370000000 HC RX 637 (ALT 250 FOR IP): Performed by: INTERNAL MEDICINE

## 2023-10-21 PROCEDURE — 92526 ORAL FUNCTION THERAPY: CPT

## 2023-10-21 PROCEDURE — 85610 PROTHROMBIN TIME: CPT

## 2023-10-21 PROCEDURE — 2700000000 HC OXYGEN THERAPY PER DAY

## 2023-10-21 PROCEDURE — 6360000002 HC RX W HCPCS: Performed by: INTERNAL MEDICINE

## 2023-10-21 PROCEDURE — 36592 COLLECT BLOOD FROM PICC: CPT

## 2023-10-21 PROCEDURE — 94660 CPAP INITIATION&MGMT: CPT

## 2023-10-21 PROCEDURE — 94761 N-INVAS EAR/PLS OXIMETRY MLT: CPT

## 2023-10-21 PROCEDURE — 6360000002 HC RX W HCPCS

## 2023-10-21 PROCEDURE — 83735 ASSAY OF MAGNESIUM: CPT

## 2023-10-21 PROCEDURE — 2580000003 HC RX 258: Performed by: INTERNAL MEDICINE

## 2023-10-21 PROCEDURE — 85025 COMPLETE CBC W/AUTO DIFF WBC: CPT

## 2023-10-21 PROCEDURE — 80053 COMPREHEN METABOLIC PANEL: CPT

## 2023-10-21 PROCEDURE — 99291 CRITICAL CARE FIRST HOUR: CPT | Performed by: INTERNAL MEDICINE

## 2023-10-21 PROCEDURE — 6370000000 HC RX 637 (ALT 250 FOR IP): Performed by: STUDENT IN AN ORGANIZED HEALTH CARE EDUCATION/TRAINING PROGRAM

## 2023-10-21 PROCEDURE — 99233 SBSQ HOSP IP/OBS HIGH 50: CPT | Performed by: NURSE PRACTITIONER

## 2023-10-21 PROCEDURE — 94640 AIRWAY INHALATION TREATMENT: CPT

## 2023-10-21 RX ORDER — ALBUTEROL SULFATE 2.5 MG/3ML
2.5 SOLUTION RESPIRATORY (INHALATION) EVERY 6 HOURS PRN
Status: DISCONTINUED | OUTPATIENT
Start: 2023-10-21 | End: 2023-10-28 | Stop reason: HOSPADM

## 2023-10-21 RX ORDER — ALBUTEROL SULFATE 2.5 MG/3ML
2.5 SOLUTION RESPIRATORY (INHALATION)
Status: DISCONTINUED | OUTPATIENT
Start: 2023-10-21 | End: 2023-10-23

## 2023-10-21 RX ORDER — POTASSIUM CHLORIDE 29.8 MG/ML
20 INJECTION INTRAVENOUS
Status: COMPLETED | OUTPATIENT
Start: 2023-10-21 | End: 2023-10-21

## 2023-10-21 RX ORDER — MAGNESIUM SULFATE IN WATER 40 MG/ML
INJECTION, SOLUTION INTRAVENOUS
Status: COMPLETED
Start: 2023-10-21 | End: 2023-10-21

## 2023-10-21 RX ORDER — MAGNESIUM SULFATE IN WATER 40 MG/ML
2000 INJECTION, SOLUTION INTRAVENOUS ONCE
Status: COMPLETED | OUTPATIENT
Start: 2023-10-21 | End: 2023-10-21

## 2023-10-21 RX ADMIN — POTASSIUM CHLORIDE 20 MEQ: 29.8 INJECTION, SOLUTION INTRAVENOUS at 08:09

## 2023-10-21 RX ADMIN — INSULIN GLARGINE 30 UNITS: 100 INJECTION, SOLUTION SUBCUTANEOUS at 08:00

## 2023-10-21 RX ADMIN — GABAPENTIN 300 MG: 300 CAPSULE ORAL at 20:11

## 2023-10-21 RX ADMIN — TIOTROPIUM BROMIDE AND OLODATEROL 2 PUFF: 3.124; 2.736 SPRAY, METERED RESPIRATORY (INHALATION) at 09:51

## 2023-10-21 RX ADMIN — FUROSEMIDE 40 MG: 10 INJECTION, SOLUTION INTRAMUSCULAR; INTRAVENOUS at 17:58

## 2023-10-21 RX ADMIN — CLOPIDOGREL BISULFATE 75 MG: 75 TABLET ORAL at 08:03

## 2023-10-21 RX ADMIN — ROFLUMILAST 500 MCG: 500 TABLET ORAL at 08:05

## 2023-10-21 RX ADMIN — ASPIRIN 81 MG: 81 TABLET, CHEWABLE ORAL at 08:01

## 2023-10-21 RX ADMIN — SODIUM CHLORIDE: 9 INJECTION, SOLUTION INTRAVENOUS at 07:06

## 2023-10-21 RX ADMIN — BUDESONIDE 500 MCG: 0.5 SUSPENSION RESPIRATORY (INHALATION) at 20:02

## 2023-10-21 RX ADMIN — BUDESONIDE 500 MCG: 0.5 SUSPENSION RESPIRATORY (INHALATION) at 09:51

## 2023-10-21 RX ADMIN — DULOXETINE HYDROCHLORIDE 60 MG: 60 CAPSULE, DELAYED RELEASE ORAL at 08:02

## 2023-10-21 RX ADMIN — MAGNESIUM SULFATE IN WATER 2000 MG: 40 INJECTION, SOLUTION INTRAVENOUS at 07:09

## 2023-10-21 RX ADMIN — HYDROCORTISONE SODIUM SUCCINATE 50 MG: 100 INJECTION, POWDER, FOR SOLUTION INTRAMUSCULAR; INTRAVENOUS at 00:00

## 2023-10-21 RX ADMIN — Medication 10 ML: at 08:04

## 2023-10-21 RX ADMIN — INSULIN GLARGINE 30 UNITS: 100 INJECTION, SOLUTION SUBCUTANEOUS at 20:12

## 2023-10-21 RX ADMIN — ATORVASTATIN CALCIUM 40 MG: 40 TABLET, FILM COATED ORAL at 08:03

## 2023-10-21 RX ADMIN — ALBUTEROL SULFATE 2.5 MG: 2.5 SOLUTION RESPIRATORY (INHALATION) at 20:02

## 2023-10-21 RX ADMIN — HYDROCORTISONE SODIUM SUCCINATE 50 MG: 100 INJECTION, POWDER, FOR SOLUTION INTRAMUSCULAR; INTRAVENOUS at 20:11

## 2023-10-21 RX ADMIN — HYDROCORTISONE SODIUM SUCCINATE 50 MG: 100 INJECTION, POWDER, FOR SOLUTION INTRAMUSCULAR; INTRAVENOUS at 05:20

## 2023-10-21 RX ADMIN — ALBUTEROL SULFATE 2.5 MG: 2.5 SOLUTION RESPIRATORY (INHALATION) at 14:58

## 2023-10-21 RX ADMIN — CETIRIZINE HYDROCHLORIDE 10 MG: 10 TABLET, FILM COATED ORAL at 08:03

## 2023-10-21 RX ADMIN — POTASSIUM CHLORIDE 20 MEQ: 29.8 INJECTION, SOLUTION INTRAVENOUS at 07:08

## 2023-10-21 RX ADMIN — GABAPENTIN 300 MG: 300 CAPSULE ORAL at 08:03

## 2023-10-21 RX ADMIN — MORPHINE SULFATE 15 MG: 15 TABLET, FILM COATED, EXTENDED RELEASE ORAL at 20:12

## 2023-10-21 RX ADMIN — OXYCODONE HYDROCHLORIDE 5 MG: 5 TABLET ORAL at 15:20

## 2023-10-21 RX ADMIN — LEVOFLOXACIN 750 MG: 500 TABLET, FILM COATED ORAL at 08:14

## 2023-10-21 RX ADMIN — MUPIROCIN: 20 OINTMENT TOPICAL at 08:04

## 2023-10-21 RX ADMIN — METOPROLOL TARTRATE 12.5 MG: 25 TABLET, FILM COATED ORAL at 08:02

## 2023-10-21 RX ADMIN — MAGNESIUM SULFATE HEPTAHYDRATE 2000 MG: 40 INJECTION, SOLUTION INTRAVENOUS at 07:09

## 2023-10-21 RX ADMIN — MORPHINE SULFATE 15 MG: 15 TABLET, FILM COATED, EXTENDED RELEASE ORAL at 08:01

## 2023-10-21 RX ADMIN — DULOXETINE HYDROCHLORIDE 60 MG: 60 CAPSULE, DELAYED RELEASE ORAL at 20:11

## 2023-10-21 RX ADMIN — FUROSEMIDE 40 MG: 10 INJECTION, SOLUTION INTRAMUSCULAR; INTRAVENOUS at 08:03

## 2023-10-21 RX ADMIN — PANTOPRAZOLE SODIUM 40 MG: 40 TABLET, DELAYED RELEASE ORAL at 05:20

## 2023-10-21 ASSESSMENT — PAIN SCALES - GENERAL
PAINLEVEL_OUTOF10: 0
PAINLEVEL_OUTOF10: 3
PAINLEVEL_OUTOF10: 0
PAINLEVEL_OUTOF10: 0
PAINLEVEL_OUTOF10: 8
PAINLEVEL_OUTOF10: 9
PAINLEVEL_OUTOF10: 0

## 2023-10-21 ASSESSMENT — PAIN DESCRIPTION - DESCRIPTORS
DESCRIPTORS: ACHING

## 2023-10-21 ASSESSMENT — PAIN DESCRIPTION - ORIENTATION
ORIENTATION: RIGHT
ORIENTATION: RIGHT

## 2023-10-21 ASSESSMENT — ENCOUNTER SYMPTOMS
SHORTNESS OF BREATH: 1
GASTROINTESTINAL NEGATIVE: 1

## 2023-10-21 ASSESSMENT — PAIN - FUNCTIONAL ASSESSMENT
PAIN_FUNCTIONAL_ASSESSMENT: ACTIVITIES ARE NOT PREVENTED
PAIN_FUNCTIONAL_ASSESSMENT: ACTIVITIES ARE NOT PREVENTED

## 2023-10-21 ASSESSMENT — PAIN DESCRIPTION - LOCATION
LOCATION: BACK
LOCATION: CHEST;BACK
LOCATION: CHEST;BACK

## 2023-10-21 ASSESSMENT — PAIN SCALES - WONG BAKER: WONGBAKER_NUMERICALRESPONSE: 0

## 2023-10-21 NOTE — PLAN OF CARE
CHF Care Plan      Patient's EF (Ejection Fraction) is less than 40%    Heart Failure Medications:  Diuretics[de-identified] Furosemide    (One of the following REQUIRED for EF </= 40%/SYSTOLIC FAILURE but MAY be used in EF% >40%/DIASTOLIC FAILURE)        ACE[de-identified] None        ARB[de-identified] None         ARNI[de-identified] Sacubitril/Valsartan-Entresto    (Beta Blockers)  NON- Evidenced Based Beta Blocker (for EF% >40%/DIASTOLIC FAILURE): Metoprolol TARTrate- Lopressor    Evidenced Based Beta Blocker::(REQUIRED for EF% <40%/SYSTOLIC FAILURE) None  . .................................................................................................................................................. Healthy Weight Tracking - BMI + Meds 10/10/2023 10/11/2023 10/16/2023 10/18/2023 10/19/2023 10/20/2023 10/21/2023   Weight 179 lb 182 lb 3.2 oz 185 lb 187 lb 6.3 oz 188 lb 7.9 oz 191 lb 12.8 oz 189 lb 2.5 oz   Height 5' 9\" 5' 9\" 5' 7\" 5' 7.008\" 5' 7.008\" - -   Body Mass Index 26.43 kg/m2 26.91 kg/m2 28.98 kg/m2 29.35 kg/m2 - 30.03 kg/m2 29.62 kg/m2   Some recent data might be hidden         Patient's weights and intake/output reviewed: Yes    Daily Weight log at bedside, patient/family participation in use of log: \"yes    Patient's Last Weight: 85.8 kg obtained by bed scale. Difference of 1.2 kg less than last documented weight.       Intake/Output Summary (Last 24 hours) at 10/21/2023 1103  Last data filed at 10/21/2023 1028  Gross per 24 hour   Intake 1079.13 ml   Output 2900 ml   Net -1820.87 ml       Education Booklet Provided: yes    Comorbidities Reviewed Yes    Patient has a past medical history of Acute on chronic diastolic heart failure due to coronary artery disease ([address]), Acute respiratory failure with hypoxia ([address]), Atherosclerosis of native artery of right lower extremity with rest pain ([address]), Back pain, Branch retinal vein occlusion, Bronchiectasis with acute exacerbation ([address]), Candidal dermatitis, Cellulitis and abscess of left leg, Cellulitis of

## 2023-10-21 NOTE — PROGRESS NOTES
Intensive Care Progress Note    Patient: Mercedes Amado MRN: [id number]  Date of  Admission: 10/19/2023   YOB: 1950  Age: 68 y.o. Sex: female    Unit: [place] [address] CVU  Room/Bed: 0228/0228-01 Attending Physician: Arnol Chavez MD   Admitting Physician: Deejay Coates        Chief Complaint   No chief complaint on file. History Obtained From   electronic medical record     History of Present Illness   This is a 71-year-old female who was transferred from [address] [place] [address] on 10/19/2023. Patient initially presented to [facility] ER on the evening of 10/16 for altered mental status. Patient has a known history of chronic nonhealing stage IV sacral ulcer. 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 Patient also had an issue of adrenal crisis and was given stress dose steroids. Patient had an acute arrest for ventricular fibrillation. Patient was coded. And transferred to [facility] for NSTEMI and then transferred from floor to ICU after code blue here at [facility]. Patient lethargic and on BiPAP therapy  Was able to talk to the family         Subjective:      No issues overnight  Patient is comfortable in bed  No issues swallowing  No chest pains or palpitations  No issues with episodes of V. tach overnight  Status post catheterization          ROS:A comprehensive review of systems was negative except for: Above    Objective: Intake and Output:   Current Shift:   No intake/output data recorded. Last three shifts:   10/19 1901 - 10/21 0700  In: 1178.6 [P.O.:530;  I.V.:397.4]  Out: 6921 [Urine:3020]    Vent settings for last 24 hours:  [unfilled]    Hemodynamic parameters for last 24 hours:  [unfilled]    Physical Exam:   Patient Vitals for the past 24 hrs:   BP Temp Temp src Pulse Resp SpO2 Weight   10/21/23 0801 104/61 -- -- 78 18 -- --   10/21/23 on prednisone 4 mg and abatacept              Infectious disease  Coccygeal osteomyelitis  Surgical consult  May need debridement  Continue with        Chronic MAC/colonized with Serratia  Continue with  Azithromycin daily at home  Levofloxacin 500 mg p.o. daily       Discussed at multidisciplinary rounds with dietitian, pharmacy and nursing  Discussed with family at the [facility] Provided: This patient had a critical illness or injury that acutely impaired one or more vital organ systems such that there was a high probability of imminent or life-threatening deterioration in the patient's condition. This required high complexity decision-making to assess, manipulate, and support vital system function(s) to treat single or multiple vital organ system failure and/or to prevent further life-threatening deterioration of the patient's condition. Total attending physician time, excluding unbundled procedures, spent at the bedside, and away from the bedside but on the unit or floor, reviewing laboratory test results, discussing care with medical staff, and discussing care with family when the patient was unable or incompetent to participate:   Critical Care Time (Minutes) # 38   (Discontinuous)                              MD Jean-Paul Fernandes  Pulmonary, Critical Care and Sleep Medicine  Office : [phone]    Please note that some or all of this record was generated using voice recognition software. If there are any questions about the content of this document, please contact the author as some errors in transcription may have occurred.

## 2023-10-21 NOTE — PROGRESS NOTES
Speech Language Pathology  Dysphagia Treatment/Follow-Up Note  Facility/Department: [facility] C2 CARD TELEMETRY    Recommendations:  Solid Consistency: IDDSI 7 Regular Solids  Liquid Consistency: IDDSI 0 Thin Liquids - NO straws   Medication: Meds whole in puree    Risk Management: upright for all intake, stay upright for at least 30 mins after intake, small bites/sips, assist feed, 1:1 supervision with intake, oral care q4 hrs to reduce adverse affects in the event of aspiration, alternate bites/sips, slow rate of intake, general GERD precautions, STRICT aspiration precautions, and hold PO and contact SLP if s/s of aspiration or worsening respiratory status develop. Virgilio Arce NAME:Gris Taveras  : 1950 (74 y.o.)   MRN: [id number]  ROOM: [facility]0228-01  ADMISSION DATE: 10/19/2023  PATIENT DIAGNOSIS(ES): NSTEMI (non-ST elevated myocardial infarction) ([address]) [I21.4]  Allergies   Allergen Reactions    Atenolol Other (See Comments)     Cough         DATE ONSET: 10/19/2023    Pain: The patient does not complain of pain       Current Diet: ADULT DIET; Regular      Diet Tolerance:  Patient tolerating current diet level without signs/symptoms of aspiration. Dysphagia Treatment and Impressions:  Subjective: Pt seen in room at bedside with RN permission   Behavior / Cognition: alert and oriented x4  RN Report/Chart Review: Rn reports pt swallowing well as long as she sits up and does not uses straw. Patient tolerance: Pt receiving regular solids but prefers softer solids    Baseline Respiratory Status Measures: Pt with SPO2% of 97 on 2 LPM NC with RR of 18  Pt with productive coughing prior to po presentations. Pt had breathing treatment just prior ST session. Liquid PO Trials:    IDDSI 0 Thin:  Assessed via cup: no anterior bolus loss , suspect functional A-P bolus transit, swallow timing subjectively appears timely, and oral clearance grossly WFL; delayed cough occurs x1 of 5 sips.      Solid PO Trials  IDDSI 4 Puree:   no anterior bolus loss , suspect functional A-P bolus transit, swallow timing subjectively appears timely, oral clearance grossly WFL, and no clinical s/s of aspiration  IDDSI 7 Regular:   no anterior bolus loss , suspect reduced/impaired A-P bolus transit, swallow timing subjectively appears timely, prolonged mastication, oral stasis noted post swallow, and no coughing. Pt requires liquid wash to aid in bolus clearance. Pt educated on options for solids d/t observations of prolonged oral phase. Given options of soft and bite-sized, ETC, and regular, pt states she prefers regular solids as it would offer wider selection of items. Pt voices understanding that dry solids and those requiring increased mastication may place pt at nutritional risk and increased risk of aspiration. Repeat Respiratory Status Measures: Pt with SPO2% of 97 on 2 LPM NC with RR of 18-21    Impressions:  Pt presents with mild oral/possible pharyngeal dysphagia characterized by prolonged mastication. Wet cough observed prior to po presentations making clinical assessment unreliable. Continue to recommend FEES to assess oropharyngeal swallow function and for treatment planning purposes. Dysphagia Goals:     Long Term Goals:   Timeframe for Long Term Goals: (7 days 10/27/23)  Goal 1: The patient will tolerate least restrictive diet with no clinical s/s of aspiration or worsening respiratory/pulmonary status 10/21/2023 : Goal addressed, see above. Ongoing, progressing. Short Term Goals  Time Frame for Short Term Goals: (5 days 10/25/23)  Goal 1: The patient will tolerate recommended diet with no clinical s/s of aspiration 5/5 10/21/2023 : Goal addressed, see above. Ongoing, progressing  Goal 2: The patient will recall/perform recommended compensatory strategies given min cues 10/21/2023 : Goal addressed, see above. Ongoing, progressing.   Goal 3: The patient will tolerate instrumental assessment when able 10/21/2023 : Goal addressed, see above. Ongoing, progressing. Recommendations:  Solid Consistency: IDDSI 7 Regular Solids  Liquid Consistency: IDDSI 0 Thin Liquids - NO straws   Medication: Meds whole in puree    Risk Management: upright for all intake, stay upright for at least 30 mins after intake, small bites/sips, assist feed, 1:1 supervision with intake, oral care q4 hrs to reduce adverse affects in the event of aspiration, increase physical mobility as able, slow rate of intake, general GERD precautions, STRICT aspiration precautions, and hold PO and contact SLP if s/s of aspiration or worsening respiratory status develop. .     Education: SLP edu pt re: Role of SLP, Rationale for dysphagia tx, Recommended compensatory strategies, Aspiration precautions, POC, Evidenced based practice for current recommendations and treatment, Rationale for FEES, and Importance of oral care to reduce adverse affects in the event of aspiration. Pt verbalized understanding and RN aware of recommendations           Plan:    Continued Dysphagia treatment with goals per plan of care. Discharge Recommendations: TBD    If pt discharges from hospital prior to Speech/Swallowing discharge, this note serves as tx and discharge summary. Total Treatment Time / Charges     Time in Time out Total Time / units   Cognitive Tx         Speech Tx      Dysphagia Tx [id number] 24 min/1 unit     Signature:  Kushal Olmos. Beverley Baron M.A.  [address] Pathologist  [id number]

## 2023-10-21 NOTE — PLAN OF CARE
CHF Care Plan      Patient's EF (Ejection Fraction) is less than 40%    Heart Failure Medications:  Diuretics[de-identified] Furosemide    (One of the following REQUIRED for EF </= 40%/SYSTOLIC FAILURE but MAY be used in EF% >40%/DIASTOLIC FAILURE)        ACE[de-identified] None        ARB[de-identified] None         ARNI[de-identified] Sacubitril/Valsartan-Entresto (on hold)    (Beta Blockers)  NON- Evidenced Based Beta Blocker (for EF% >40%/DIASTOLIC FAILURE): Metoprolol TARTrate- Lopressor    Evidenced Based Beta Blocker::(REQUIRED for EF% <40%/SYSTOLIC FAILURE) None  . .................................................................................................................................................. Healthy Weight Tracking - BMI + Meds 10/10/2023 10/11/2023 10/16/2023 10/18/2023 10/19/2023 10/20/2023 10/21/2023   Weight 179 lb 182 lb 3.2 oz 185 lb 187 lb 6.3 oz 188 lb 7.9 oz 191 lb 12.8 oz 189 lb 2.5 oz   Height 5' 9\" 5' 9\" 5' 7\" 5' 7.008\" 5' 7.008\" - -   Body Mass Index 26.43 kg/m2 26.91 kg/m2 28.98 kg/m2 29.35 kg/m2 - 30.03 kg/m2 29.62 kg/m2   Some recent data might be hidden         Patient's weights and intake/output reviewed: Yes    Daily Weight log at bedside, patient/family participation in use of log: \"yes    Patient's Last Weight: 85.8 kg obtained by bed scale. Difference of 1.2 kg less than last documented weight.       Intake/Output Summary (Last 24 hours) at 10/21/2023 0731  Last data filed at 10/21/2023 0600  Gross per 24 hour   Intake 578.08 ml   Output 2750 ml   Net -2171.92 ml         Education Booklet Provided: yes    Comorbidities Reviewed Yes    Patient has a past medical history of Acute on chronic diastolic heart failure due to coronary artery disease ([address]), Acute respiratory failure with hypoxia ([address]), Atherosclerosis of native artery of right lower extremity with rest pain ([address]), Back pain, Branch retinal vein occlusion, Bronchiectasis with acute exacerbation ([address]), Candidal dermatitis, Cellulitis and abscess of left leg, Cellulitis of left lower extremity, CHF (congestive heart failure) (HCC), Closed compression fracture of thoracic vertebra (HCC), Closed fracture of facial bone with routine healing, Closed jaw fracture ([address]), Community acquired pneumonia of left lower lobe of lung, Compression fracture of L1 lumbar vertebra (HCC), COPD (chronic obstructive pulmonary disease) ([address]), COPD exacerbation ([address]), COVID, Diabetes mellitus ([address]), Fracture of tibial plateau, closed, left, initial encounter, HCAP (healthcare-associated pneumonia), Minimally displaced zone I fracture of sacrum ([address]), MRSA (methicillin resistant staph aureus) culture positive, MRSA (methicillin resistant Staphylococcus aureus), Mucus plugging of bronchi, NSTEMI (non-ST elevated myocardial infarction) ([address]), Osteomyelitis of mandible, Osteoporosis with pathological fracture, Pneumonia of left lower lobe due to infectious organism, Post herpetic neuralgia, Pressure ulcer of left heel, stage 3 ([address]), Proximal humerus fracture, Rheumatoid arthritis ([address]), Shingles, Sleep apnea, Status post incision and drainage, Surgical wound dehiscence, initial encounter (at [facility] harvest site), Temporal arteritis ([address]), Tobacco use, Tracheomalacia, and Vitreous hemorrhage, right eye ([address]). >>For CHF and Comorbidity documentation on Education Time and Topics, please see Education Tab    Progressive Mobility Assessment:  What is this patient's Current Level of Mobility?: Requires Bed Rest  How was this patient Mobilized today?: Unable to Mobilize, ambulated 0 ft                 With Whom? Nurse                 Level of Difficulty/Assistance:  dependent      Pt resting in bed at this time on  2 L O2. Pt w/o complaints of shortness of breath.  Pt with pitting lower extremity edema, +1 / +2    Patient and/or Family's stated Goal of Care this Admission: reduce shortness of breath, increase activity tolerance, better understand heart failure and disease management, be more comfortable, and reduce lower extremity edema prior to discharge    Electronically signed by Mirta Bamberger, RN on 10/21/2023 at 7:33 AM

## 2023-10-21 NOTE — PROGRESS NOTES
V2.0    [facility] [place] Progress Note      Name:  Danny Perry /Age/Sex: 1950  (68 y.o. female)   MRN & CSN:  [id number] & [id number] Encounter Date/Time: 10/21/2023 12:00 PM EDT   Location:  [facility]80228-01 PCP: Juvenal Miller MD     Attending:Radha Moreno MD       Hospital Day: 3    Assessment and Recommendations   Danny Perry is a 68 y.o. female with pmh of hypertension, CAD (CABG), BIMAL clip, cardiomyopathy (EF has improved from 30% to 50%), COPD/bronchiectasis, tracheomalacia, obstructive sleep apnea, diabetes type 2, rheumatoid arthritis (on prednisone 4 mg daily), CKD 3 and chronic nonhealing stage IV pressure ulcer admitted and [facility] on 10/1640 follows:  1. Nonhealing stage IV pressure ulcer-MRI consistent with osteomyelitis. ID consulted-continue wound care, no need for antibiotics for chronic wound. 2.  Hypotension: Status post pressors. This is due to adrenal crisis. Currently on IV hydrocortisone. 3.  Bronchiectasis: Critical care/pulmonology did bronchoscopy. BAL 10/13 grew Serratia. Started on Levaquin. 4.  Elevated troponin: Started on heparin drip and transferred here for cardiology evaluation. However, patient went into V-fib cardiac arrest when patient arrived here on 10/18 requiring CPR only. ROSC attained less than 2 minutes. 1.  V-fib cardiac arrest: Status post amiodarone drip. Cardiology consult appreciated. Possible non-ST elevation MI.  CT scan of the head-no acute infarct. 2.  Non-ST elevation MI: Continue heparin drip. Status post nitroglycerin drip. Cardiac catheterization on 10/20-occluded SVG to OM. Medical management recommended. On metoprolol, aspirin, Plavix and Lipitor. 3.  Cardiomyopathy: Echo 10/19-EF 30% with regional wall motion abnormality. On IV Lasix. Hold off Entresto for now because of recent hypotension. Continue metoprolol. LifeVest before discharge. 4.  Hypotension: Status post pressors.   This is due to adrenal changes. 1. Ulceration and gas containing sinus tract superficial to the lower coccyx with subjacent suspected early osteomyelitis of the mid coccyx. 2. Moderate phlegmonous changes/cellulitis along the posterior midline lower back. 3. Probable remote compression deformity of L5. Mild-to-moderate degenerative changes in the lower lumbar/lumbosacral spine. 4. No MR evidence for sacroiliitis. SI joints appear patent. The findings were sent to the Radiology Results [address] at 7:50 pm on 10/16/2023 to be communicated to a licensed caregiver. CBC:   Recent Labs     10/19/23  0540 10/20/23  0635 10/21/23  0530   WBC 11.8* 7.0 7.2   HGB 9.3* 8.8* 8.6*    225 231       BMP:    Recent Labs     10/19/23  0540 10/20/23  0635 10/21/23  0530    138 140   K 3.8 3.7 3.2*   CL 96* 103 103   CO2 17* 27 30   BUN 21* 18 19   CREATININE 1.3* 1.2 1.3*   GLUCOSE 481* 181* 86       Hepatic:   Recent Labs     10/19/23  0540 10/20/23  0635 10/21/23  0530   AST 33 24 16   ALT 12 10 10   BILITOT 0.4 0.3 0.3   ALKPHOS 70 62 57       Lipids:   Lab Results   Component Value Date/Time    CHOL 112 10/29/2021 08:23 AM    HDL 34 10/29/2021 08:23 AM    HDL 43 07/22/2010 09:28 AM    TRIG 85 10/29/2021 08:23 AM     Hemoglobin A1C:   Lab Results   Component Value Date/Time    LABA1C 7.8 08/05/2023 02:22 PM     TSH: No results found for: \"TSH\"  Troponin: No results found for: \"TROPONINT\"  Lactic Acid:   Recent Labs     10/19/23  0746 10/19/23  1135 10/19/23  1808   LACTA 3.3* 2.1* 2.5*       BNP:   No results for input(s): \"PROBNP\" in the last 72 hours.     UA:  Lab Results   Component Value Date/Time    NITRU Negative 10/16/2023 09:15 PM    COLORU Yellow 10/16/2023 09:15 PM    PHUR 6.0 10/16/2023 09:15 PM    WBCUA 10-20 10/16/2023 09:15 PM    RBCUA 3-4 10/16/2023 09:15 PM    MUCUS Rare 08/01/2022 12:00 PM    BACTERIA 1+ 10/16/2023 09:15 PM    CLARITYU Clear 10/16/2023 09:15 PM    SPECGRAV 1.010 10/16/2023 09:15 PM LEUKOCYTESUR Negative 10/16/2023 09:15 PM    UROBILINOGEN 0.2 10/16/2023 09:15 PM    BILIRUBINUR Negative 10/16/2023 09:15 PM    BILIRUBINUR NEGATIVE 03/20/2010 12:52 PM    BLOODU TRACE-INTACT 10/16/2023 09:15 PM    GLUCOSEU Negative 10/16/2023 09:15 PM    GLUCOSEU NEGATIVE 03/20/2010 12:52 PM    KETUA Negative 10/16/2023 09:15 PM     Urine Cultures:   Lab Results   Component Value Date/Time    LABURIN No growth at 18 to 36 hours 10/16/2023 09:15 PM     Blood Cultures:   Lab Results   Component Value Date/Time    BC No Growth after 4 days of incubation. 10/16/2023 07:15 PM     Lab Results   Component Value Date/Time    BLOODCULT2 No Growth after 4 days of incubation.  10/16/2023 07:36 PM     Organism:   Lab Results   Component Value Date/Time    ORG Serratia marcescens 10/13/2023 11:45 AM    ORG Zoe dubliniensis 10/13/2023 11:45 AM    ORG Serratia marcescens 10/13/2023 11:45 AM    ORG Candida albicans 10/13/2023 11:45 AM         Electronically signed by Francisco Nuno MD on 10/21/2023 at 12:20 PM

## 2023-10-21 NOTE — PROGRESS NOTES
10/21/23 0000   NIV Type   NIV Started/Stopped On   Equipment Type v60   Mode Bilevel   Mask Type Full face mask   Mask Size Medium   Assessment   Pulse 71   Respirations 24   SpO2 97 %   Comfort Level Fair   Using Accessory Muscles No   Mask Compliance Good   Skin Assessment Clean, dry, & intact   Skin Protection for O2 Device Yes   Orientation Middle   Location Nose   Settings/Measurements   IPAP 12 cmH20   CPAP/EPAP 6 cmH2O   Vt (Measured) 474 mL   Rate Ordered 10   FiO2  35 %   Minute Volume (L/min) 8.9 Liters   Mask Leak (lpm) 10 lpm   Patient's Home Machine No   Alarm Settings   Alarms On Y   Low Pressure (cmH2O) 6 cmH2O   High Pressure (cmH2O) 30 cmH2O   Delay Alarm 20 sec(s)   RR Low (bpm) 6   RR High (bpm) 40 br/min

## 2023-10-21 NOTE — PROGRESS NOTES
10/21/23 0404   NIV Type   Equipment Type v60   Mode Bilevel   Mask Type Full face mask   Mask Size Medium   Assessment   Pulse 69   Respirations 12   SpO2 96 %   Comfort Level Good   Using Accessory Muscles No   Mask Compliance Good   Skin Protection for O2 Device Yes   Orientation Middle   Location Nose   Settings/Measurements   IPAP 12 cmH20   CPAP/EPAP 6 cmH2O   Vt (Measured) 703 mL   Rate Ordered 10   FiO2  35 %   Minute Volume (L/min) 8.3 Liters   Mask Leak (lpm) 24 lpm   Patient's Home Machine No   Alarm Settings   Alarms On Y   Low Pressure (cmH2O) 6 cmH2O   High Pressure (cmH2O) 30 cmH2O   Delay Alarm 20 sec(s)   RR Low (bpm) 6   RR High (bpm) 40 br/min

## 2023-10-21 NOTE — PLAN OF CARE
Problem: Safety - Adult  Goal: Free from fall injury  Outcome: Progressing     Problem: Chronic Conditions and Co-morbidities  Goal: Patient's chronic conditions and co-morbidity symptoms are monitored and maintained or improved  10/20/2023 2249 by Waldemar Coker RN  Outcome: Progressing  Flowsheets (Taken 10/20/2023 2000)  Care Plan - Patient's Chronic Conditions and Co-Morbidity Symptoms are Monitored and Maintained or Improved: Monitor and assess patient's chronic conditions and comorbid symptoms for stability, deterioration, or improvement  10/20/2023 1145 by Siomara Reyes RN  Outcome: Progressing  Flowsheets  Taken 10/20/2023 [address] - Patient's Chronic Conditions and Co-Morbidity Symptoms are Monitored and Maintained or Improved:   Monitor and assess patient's chronic conditions and comorbid symptoms for stability, deterioration, or improvement   Collaborate with multidisciplinary team to address chronic and comorbid conditions and prevent exacerbation or deterioration   Update acute care plan with appropriate goals if chronic or comorbid symptoms are exacerbated and prevent overall improvement and discharge  Taken 10/20/2023 0800  Care Plan - Patient's Chronic Conditions and Co-Morbidity Symptoms are Monitored and Maintained or Improved:   Monitor and assess patient's chronic conditions and comorbid symptoms for stability, deterioration, or improvement   Collaborate with multidisciplinary team to address chronic and comorbid conditions and prevent exacerbation or deterioration   Update acute care plan with appropriate goals if chronic or comorbid symptoms are exacerbated and prevent overall improvement and discharge     Problem: Discharge Planning  Goal: Discharge to home or other facility with appropriate resources  10/20/2023 2249 by Waldemar Coker RN  Outcome: Progressing  Flowsheets (Taken 10/20/2023 2000)  Discharge to home or other facility with appropriate resources: Identify barriers to 10/20/2023 1145)  Maintains optimal cardiac output and hemodynamic stability:   Monitor blood pressure and heart rate   Monitor urine output and notify Licensed Independent Practitioner for values outside of normal range   Assess for signs of decreased cardiac output  Goal: Absence of cardiac dysrhythmias or at baseline  Outcome: Progressing  Flowsheets (Taken 10/20/2023 2000)  Absence of cardiac dysrhythmias or at baseline: Monitor cardiac rate and rhythm     Problem: Skin/Tissue Integrity - Adult  Goal: Incisions, wounds, or drain sites healing without S/S of infection  10/20/2023 2249 by Marquis Carter RN  Outcome: Progressing  Flowsheets (Taken 10/20/2023 2000)  Incisions, Wounds, or Drain Sites Healing Without Sign and Symptoms of Infection: ADMISSION and DAILY: Assess and document risk factors for pressure ulcer development  10/20/2023 1145 by Blake Justin RN  Outcome: Progressing  Flowsheets  Taken 10/20/2023 1145  Incisions, Wounds, or Drain Sites Healing Without Sign and Symptoms of Infection: TWICE DAILY: Assess and document skin integrity  Taken 10/20/2023 1144  Incisions, Wounds, or Drain Sites Healing Without Sign and Symptoms of Infection: TWICE DAILY: Assess and document skin integrity     Problem: Genitourinary - Adult  Goal: Urinary catheter remains patent  10/20/2023 2249 by Marquis Carter RN  Outcome: Progressing  Flowsheets (Taken 10/20/2023 2000)  Urinary catheter remains patent: Assess patency of urinary catheter  10/20/2023 1145 by Blake Justin RN  Outcome: Progressing  Flowsheets (Taken 10/20/2023 1145)  Urinary catheter remains patent: Assess patency of urinary catheter     Problem: Metabolic/Fluid and Electrolytes - Adult  Goal: Glucose maintained within prescribed range  Outcome: Progressing  Flowsheets (Taken 10/20/2023 2000)  Glucose maintained within prescribed range: Monitor blood glucose as ordered     Problem: Nutrition Deficit:  Goal: Optimize nutritional status  Outcome:

## 2023-10-22 LAB
ALBUMIN SERPL-MCNC: 2.5 G/DL (ref 3.4–5)
ALBUMIN/GLOB SERPL: 0.9 {RATIO} (ref 1.1–2.2)
ALP SERPL-CCNC: 53 U/L (ref 40–129)
ALT SERPL-CCNC: 12 U/L (ref 10–40)
ANION GAP SERPL CALCULATED.3IONS-SCNC: 6 MMOL/L (ref 3–16)
AST SERPL-CCNC: 18 U/L (ref 15–37)
BASOPHILS # BLD: 0 K/UL (ref 0–0.2)
BASOPHILS NFR BLD: 0.1 %
BILIRUB SERPL-MCNC: 0.3 MG/DL (ref 0–1)
BUN SERPL-MCNC: 22 MG/DL (ref 7–20)
CALCIUM SERPL-MCNC: 8.2 MG/DL (ref 8.3–10.6)
CHLORIDE SERPL-SCNC: 100 MMOL/L (ref 99–110)
CO2 SERPL-SCNC: 30 MMOL/L (ref 21–32)
CREAT SERPL-MCNC: 1.4 MG/DL (ref 0.6–1.2)
DEPRECATED RDW RBC AUTO: 14.7 % (ref 12.4–15.4)
EOSINOPHIL # BLD: 0 K/UL (ref 0–0.6)
EOSINOPHIL NFR BLD: 0.1 %
GFR SERPLBLD CREATININE-BSD FMLA CKD-EPI: 40 ML/MIN/{1.73_M2}
GLUCOSE BLD-MCNC: 257 MG/DL (ref 70–99)
GLUCOSE BLD-MCNC: 325 MG/DL (ref 70–99)
GLUCOSE BLD-MCNC: 341 MG/DL (ref 70–99)
GLUCOSE BLD-MCNC: 88 MG/DL (ref 70–99)
GLUCOSE SERPL-MCNC: 83 MG/DL (ref 70–99)
HCT VFR BLD AUTO: 24.4 % (ref 36–48)
HGB BLD-MCNC: 8.3 G/DL (ref 12–16)
INR PPP: 1.06 (ref 0.84–1.16)
LYMPHOCYTES # BLD: 0.6 K/UL (ref 1–5.1)
LYMPHOCYTES NFR BLD: 10.2 %
MAGNESIUM SERPL-MCNC: 2.1 MG/DL (ref 1.8–2.4)
MCH RBC QN AUTO: 30.3 PG (ref 26–34)
MCHC RBC AUTO-ENTMCNC: 34 G/DL (ref 31–36)
MCV RBC AUTO: 89 FL (ref 80–100)
MONOCYTES # BLD: 0.4 K/UL (ref 0–1.3)
MONOCYTES NFR BLD: 7.4 %
NEUTROPHILS # BLD: 4.7 K/UL (ref 1.7–7.7)
NEUTROPHILS NFR BLD: 82.2 %
PERFORMED ON: ABNORMAL
PERFORMED ON: NORMAL
PLATELET # BLD AUTO: 223 K/UL (ref 135–450)
PMV BLD AUTO: 7.6 FL (ref 5–10.5)
POTASSIUM SERPL-SCNC: 3.7 MMOL/L (ref 3.5–5.1)
PROT SERPL-MCNC: 5.2 G/DL (ref 6.4–8.2)
PROTHROMBIN TIME: 13.8 SEC (ref 11.5–14.8)
RBC # BLD AUTO: 2.74 M/UL (ref 4–5.2)
SODIUM SERPL-SCNC: 136 MMOL/L (ref 136–145)
WBC # BLD AUTO: 5.8 K/UL (ref 4–11)

## 2023-10-22 PROCEDURE — 80053 COMPREHEN METABOLIC PANEL: CPT

## 2023-10-22 PROCEDURE — 83735 ASSAY OF MAGNESIUM: CPT

## 2023-10-22 PROCEDURE — 99291 CRITICAL CARE FIRST HOUR: CPT | Performed by: INTERNAL MEDICINE

## 2023-10-22 PROCEDURE — 94761 N-INVAS EAR/PLS OXIMETRY MLT: CPT

## 2023-10-22 PROCEDURE — 6360000002 HC RX W HCPCS: Performed by: INTERNAL MEDICINE

## 2023-10-22 PROCEDURE — 94669 MECHANICAL CHEST WALL OSCILL: CPT

## 2023-10-22 PROCEDURE — 2580000003 HC RX 258: Performed by: INTERNAL MEDICINE

## 2023-10-22 PROCEDURE — 2000000000 HC ICU R&B

## 2023-10-22 PROCEDURE — 6370000000 HC RX 637 (ALT 250 FOR IP): Performed by: NURSE PRACTITIONER

## 2023-10-22 PROCEDURE — 6360000002 HC RX W HCPCS: Performed by: NURSE PRACTITIONER

## 2023-10-22 PROCEDURE — 6370000000 HC RX 637 (ALT 250 FOR IP): Performed by: INTERNAL MEDICINE

## 2023-10-22 PROCEDURE — 85025 COMPLETE CBC W/AUTO DIFF WBC: CPT

## 2023-10-22 PROCEDURE — 2700000000 HC OXYGEN THERAPY PER DAY

## 2023-10-22 PROCEDURE — 85610 PROTHROMBIN TIME: CPT

## 2023-10-22 PROCEDURE — 94640 AIRWAY INHALATION TREATMENT: CPT

## 2023-10-22 PROCEDURE — 36592 COLLECT BLOOD FROM PICC: CPT

## 2023-10-22 PROCEDURE — 99233 SBSQ HOSP IP/OBS HIGH 50: CPT | Performed by: NURSE PRACTITIONER

## 2023-10-22 RX ORDER — POTASSIUM CHLORIDE 20 MEQ/1
40 TABLET, EXTENDED RELEASE ORAL ONCE
Status: COMPLETED | OUTPATIENT
Start: 2023-10-22 | End: 2023-10-22

## 2023-10-22 RX ORDER — GUAIFENESIN 600 MG/1
600 TABLET, EXTENDED RELEASE ORAL 2 TIMES DAILY
Status: DISCONTINUED | OUTPATIENT
Start: 2023-10-22 | End: 2023-10-28 | Stop reason: HOSPADM

## 2023-10-22 RX ORDER — METOPROLOL SUCCINATE 25 MG/1
12.5 TABLET, EXTENDED RELEASE ORAL DAILY
Status: DISCONTINUED | OUTPATIENT
Start: 2023-10-22 | End: 2023-10-24

## 2023-10-22 RX ORDER — FUROSEMIDE 10 MG/ML
20 INJECTION INTRAMUSCULAR; INTRAVENOUS 2 TIMES DAILY
Status: COMPLETED | OUTPATIENT
Start: 2023-10-22 | End: 2023-10-23

## 2023-10-22 RX ADMIN — INSULIN GLARGINE 30 UNITS: 100 INJECTION, SOLUTION SUBCUTANEOUS at 08:54

## 2023-10-22 RX ADMIN — DULOXETINE HYDROCHLORIDE 60 MG: 60 CAPSULE, DELAYED RELEASE ORAL at 08:55

## 2023-10-22 RX ADMIN — MORPHINE SULFATE 15 MG: 15 TABLET, FILM COATED, EXTENDED RELEASE ORAL at 08:55

## 2023-10-22 RX ADMIN — GUAIFENESIN 600 MG: 600 TABLET ORAL at 12:44

## 2023-10-22 RX ADMIN — POTASSIUM CHLORIDE 40 MEQ: 1500 TABLET, EXTENDED RELEASE ORAL at 09:00

## 2023-10-22 RX ADMIN — GABAPENTIN 300 MG: 300 CAPSULE ORAL at 20:51

## 2023-10-22 RX ADMIN — Medication 10 ML: at 20:52

## 2023-10-22 RX ADMIN — CETIRIZINE HYDROCHLORIDE 10 MG: 10 TABLET, FILM COATED ORAL at 08:55

## 2023-10-22 RX ADMIN — DOCUSATE SODIUM 100 MG: 100 CAPSULE, LIQUID FILLED ORAL at 12:44

## 2023-10-22 RX ADMIN — PANTOPRAZOLE SODIUM 40 MG: 40 TABLET, DELAYED RELEASE ORAL at 06:49

## 2023-10-22 RX ADMIN — ROFLUMILAST 500 MCG: 500 TABLET ORAL at 09:00

## 2023-10-22 RX ADMIN — MUPIROCIN: 20 OINTMENT TOPICAL at 20:56

## 2023-10-22 RX ADMIN — FUROSEMIDE 20 MG: 10 INJECTION, SOLUTION INTRAMUSCULAR; INTRAVENOUS at 18:29

## 2023-10-22 RX ADMIN — GABAPENTIN 300 MG: 300 CAPSULE ORAL at 08:57

## 2023-10-22 RX ADMIN — INSULIN GLARGINE 30 UNITS: 100 INJECTION, SOLUTION SUBCUTANEOUS at 21:00

## 2023-10-22 RX ADMIN — MUPIROCIN: 20 OINTMENT TOPICAL at 08:53

## 2023-10-22 RX ADMIN — Medication: at 23:01

## 2023-10-22 RX ADMIN — HYDROCORTISONE SODIUM SUCCINATE 50 MG: 100 INJECTION, POWDER, FOR SOLUTION INTRAMUSCULAR; INTRAVENOUS at 08:54

## 2023-10-22 RX ADMIN — Medication 10 ML: at 08:54

## 2023-10-22 RX ADMIN — HYDROCORTISONE SODIUM SUCCINATE 50 MG: 100 INJECTION, POWDER, FOR SOLUTION INTRAMUSCULAR; INTRAVENOUS at 20:52

## 2023-10-22 RX ADMIN — INSULIN LISPRO 12 UNITS: 100 INJECTION, SOLUTION INTRAVENOUS; SUBCUTANEOUS at 18:28

## 2023-10-22 RX ADMIN — BUDESONIDE 500 MCG: 0.5 SUSPENSION RESPIRATORY (INHALATION) at 19:54

## 2023-10-22 RX ADMIN — CLOPIDOGREL BISULFATE 75 MG: 75 TABLET ORAL at 08:54

## 2023-10-22 RX ADMIN — OXYCODONE HYDROCHLORIDE 5 MG: 5 TABLET ORAL at 03:57

## 2023-10-22 RX ADMIN — GUAIFENESIN 600 MG: 600 TABLET ORAL at 20:51

## 2023-10-22 RX ADMIN — ALBUTEROL SULFATE 2.5 MG: 2.5 SOLUTION RESPIRATORY (INHALATION) at 08:08

## 2023-10-22 RX ADMIN — FUROSEMIDE 20 MG: 10 INJECTION, SOLUTION INTRAMUSCULAR; INTRAVENOUS at 09:00

## 2023-10-22 RX ADMIN — ALBUTEROL SULFATE 2.5 MG: 2.5 SOLUTION RESPIRATORY (INHALATION) at 11:55

## 2023-10-22 RX ADMIN — ALBUTEROL SULFATE 2.5 MG: 2.5 SOLUTION RESPIRATORY (INHALATION) at 15:52

## 2023-10-22 RX ADMIN — METOPROLOL SUCCINATE 12.5 MG: 25 TABLET, EXTENDED RELEASE ORAL at 18:29

## 2023-10-22 RX ADMIN — ATORVASTATIN CALCIUM 40 MG: 40 TABLET, FILM COATED ORAL at 08:54

## 2023-10-22 RX ADMIN — DULOXETINE HYDROCHLORIDE 60 MG: 60 CAPSULE, DELAYED RELEASE ORAL at 20:51

## 2023-10-22 RX ADMIN — MORPHINE SULFATE 15 MG: 15 TABLET, FILM COATED, EXTENDED RELEASE ORAL at 20:51

## 2023-10-22 RX ADMIN — ALBUTEROL SULFATE 2.5 MG: 2.5 SOLUTION RESPIRATORY (INHALATION) at 19:54

## 2023-10-22 RX ADMIN — ASPIRIN 81 MG: 81 TABLET, CHEWABLE ORAL at 08:54

## 2023-10-22 RX ADMIN — INSULIN LISPRO 8 UNITS: 100 INJECTION, SOLUTION INTRAVENOUS; SUBCUTANEOUS at 12:52

## 2023-10-22 RX ADMIN — INSULIN LISPRO 12 UNITS: 100 INJECTION, SOLUTION INTRAVENOUS; SUBCUTANEOUS at 21:10

## 2023-10-22 RX ADMIN — BENZONATATE 200 MG: 100 CAPSULE ORAL at 12:44

## 2023-10-22 RX ADMIN — TIOTROPIUM BROMIDE AND OLODATEROL 2 PUFF: 3.124; 2.736 SPRAY, METERED RESPIRATORY (INHALATION) at 08:13

## 2023-10-22 ASSESSMENT — PAIN DESCRIPTION - ORIENTATION: ORIENTATION: RIGHT

## 2023-10-22 ASSESSMENT — PAIN - FUNCTIONAL ASSESSMENT: PAIN_FUNCTIONAL_ASSESSMENT: ACTIVITIES ARE NOT PREVENTED

## 2023-10-22 ASSESSMENT — PAIN SCALES - GENERAL
PAINLEVEL_OUTOF10: 0
PAINLEVEL_OUTOF10: 0
PAINLEVEL_OUTOF10: 3

## 2023-10-22 ASSESSMENT — PAIN DESCRIPTION - DESCRIPTORS: DESCRIPTORS: ACHING

## 2023-10-22 ASSESSMENT — ENCOUNTER SYMPTOMS
SHORTNESS OF BREATH: 1
GASTROINTESTINAL NEGATIVE: 1

## 2023-10-22 ASSESSMENT — PAIN DESCRIPTION - LOCATION: LOCATION: BACK

## 2023-10-22 NOTE — FLOWSHEET NOTE
10/21/23 2000   Assessment   Charting Type Shift assessment   Psychosocial   Psychosocial (WDL) WDL   Neurological   Neuro (WDL) WDL   Level of Consciousness 0   Orientation Level Oriented X4   Cognition Appropriate judgement; Appropriate safety awareness; Follows commands   Speech Clear; Appropriate for developmental age;Nods/gestures appropriately   R Pupil Size (mm) 3   R Pupil Shape Round   R Pupil Reaction Brisk   L Pupil Size (mm) 3   L Pupil Shape Round   L Pupil Reaction Brisk   R Hand  Weak   L Hand  Weak   R Foot Dorsiflexion Weak   L Foot Dorsiflexion Weak   R Foot Plantar Flexion Weak   L Foot Plantar Flexion Weak   RUE Motor Response Responds to command   RUE Sensation  Full sensation   LUE Motor Response Responds to command   LUE Sensation  Full sensation   RLE Motor Response Responds to command   RLE Sensation  Full sensation   LLE Motor Response Responds to command   LLE Sensation  Full sensation   Gag Present   Cough Reflex Present   [place] Coma Scale   Eye Opening 4   Best Verbal Response 5   Best Motor Response 6   [place] Coma Scale Score 15   HEENT (Head, Ears, Eyes, Nose, & Throat)   HEENT (WDL) X   Teeth Edentulous; Dentures upper   Respiratory   Respiratory (WDL) X   Subcutaneous Air/Crepitus None   Respiratory Pattern Regular   Respiratory Depth Normal   Respiratory Quality/Effort Unlabored   Chest Assessment Chest expansion symmetrical   L Breath Sounds Diminished; Expiratory Wheezes; Rhonchi   R Breath Sounds Diminished; Expiratory Wheezes; Rhonchi   Breath Sounds   Right Upper Lobe Diminished; Expiratory wheezes   Right Middle Lobe Diminished;Rhonchi   Right Lower Lobe Diminished;Rhonchi   Left Upper Lobe Diminished; Expiratory wheezes; Rhonchi   Left Lower Lobe Diminished;Rhonchi   Cough/Sputum   Sputum How Obtained Spontaneous cough   Cough Congested;Non-productive;Moist   Frequency Occasional   Sputum Amount None   Cardiac   Cardiac (WDL) WDL   Cardiac Regularity Regular   Heart

## 2023-10-22 NOTE — FLOWSHEET NOTE
Bipap removed and placed back on NC @2L   10/22/23 0230   Oxygen Therapy   SpO2 98 %   O2 Device Nasal cannula   O2 Flow Rate (L/min) 2 L/min

## 2023-10-22 NOTE — PROGRESS NOTES
V2.0    [facility] [place]      Name:  Lay Juarez /Age/Sex: 1950  (68 y.o. female)   MRN & CSN:  [id number] & [id number] Encounter Date/Time: 10/22/2023 12:00 PM EDT   Location:  [facility]/0228-01 PCP: April Saldana MD     Attending:Lindsay Moreno MD       Hospital Day: 4    Assessment and Recommendations   Lay Juarez is a 68 y.o. female with pmh of hypertension, CAD (CABG), BIMAL clip, cardiomyopathy (EF has improved from 30% to 50%), COPD/bronchiectasis, tracheomalacia, obstructive sleep apnea, diabetes type 2, rheumatoid arthritis (on prednisone 4 mg daily), CKD 3 and chronic nonhealing stage IV pressure ulcer admitted and [facility] on 10/1640 follows:  1. Nonhealing stage IV pressure ulcer-MRI consistent with osteomyelitis. ID consulted-continue wound care, no need for antibiotics for chronic wound. 2.  Hypotension: Status post pressors. This is due to adrenal crisis. Currently on IV hydrocortisone. 3.  Bronchiectasis: Critical care/pulmonology did bronchoscopy. BAL 10/13 grew Serratia. Started on Levaquin. 4.  Elevated troponin: Started on heparin drip and transferred here for cardiology evaluation. However, patient went into V-fib cardiac arrest when patient arrived here on 10/18 requiring CPR only. ROSC attained less than 2 minutes. 1.  V-fib cardiac arrest: Status post amiodarone drip. Cardiology consult appreciated. Possible non-ST elevation MI.  CT scan of the head-no acute infarct. 2.  Non-ST elevation MI: Continue heparin drip. Status post nitroglycerin drip. Cardiac catheterization on 10/20-occluded SVG to OM. Medical management recommended. On metoprolol, aspirin, Plavix and Lipitor. 3.  Cardiomyopathy: Echo 10/19-EF 30% with regional wall motion abnormality. On IV Lasix 20 mg twice daily. Hold off Entresto for now because of recent hypotension. Continue metoprolol. LifeVest before discharge. 4.  Hypotension: Status post pressors.   This is

## 2023-10-22 NOTE — PROGRESS NOTES
10/21/23 2331   NIV Type   NIV Started/Stopped On   Equipment Type v60   Mode Bilevel   Mask Type Full face mask   Mask Size Medium   Assessment   Pulse 86   Respirations 15   Settings/Measurements   IPAP 12 cmH20   CPAP/EPAP 6 cmH2O   Vt (Measured) 470 mL   Rate Ordered 10   FiO2  (S)  30 %   Mask Leak (lpm) 9 lpm   Patient's Home Machine No   Alarm Settings   Alarms On Y   Low Pressure (cmH2O) 6 cmH2O   High Pressure (cmH2O) 30 cmH2O

## 2023-10-22 NOTE — PLAN OF CARE
CHF Care Plan      Patient's EF (Ejection Fraction) is less than 40%    Heart Failure Medications:  Diuretics[de-identified] Furosemide    (One of the following REQUIRED for EF </= 40%/SYSTOLIC FAILURE but MAY be used in EF% >40%/DIASTOLIC FAILURE)        ACE[de-identified] None        ARB[de-identified] None         ARNI[de-identified] Sacubitril/Valsartan-Entresto    (Beta Blockers)  NON- Evidenced Based Beta Blocker (for EF% >40%/DIASTOLIC FAILURE): None    Evidenced Based Beta Blocker::(REQUIRED for EF% <40%/SYSTOLIC FAILURE) Metoprolol SUCCinate- Toprol XL  . .................................................................................................................................................. Healthy Weight Tracking - BMI + Meds 10/16/2023 10/18/2023 10/19/2023 10/20/2023 10/21/2023 10/22/2023 10/22/2023   Weight 185 lb 187 lb 6.3 oz 188 lb 7.9 oz 191 lb 12.8 oz 189 lb 2.5 oz 179 lb 10.8 oz 189 lb 2.5 oz   Height 5' 7\" 5' 7.008\" 5' 7.008\" - - - -   Body Mass Index 28.98 kg/m2 29.35 kg/m2 - 30.03 kg/m2 29.62 kg/m2 28.13 kg/m2 29.62 kg/m2   Some recent data might be hidden         Patient's weights and intake/output reviewed: Yes    Daily Weight log at bedside, patient/family participation in use of log: \"yes    Patient's Last Weight: 81.5 kg obtained by bed scale. Difference of 4.3 kgless than last documented weight.       Intake/Output Summary (Last 24 hours) at 10/22/2023 1957  Last data filed at 10/22/2023 1800  Gross per 24 hour   Intake 1140 ml   Output 1350 ml   Net -210 ml       Education Booklet Provided: yes    Comorbidities Reviewed Yes    Patient has a past medical history of Acute on chronic diastolic heart failure due to coronary artery disease ([address]), Acute respiratory failure with hypoxia ([address]), Atherosclerosis of native artery of right lower extremity with rest pain ([address]), Back pain, Branch retinal vein occlusion, Bronchiectasis with acute exacerbation ([address]), Candidal dermatitis, Cellulitis and abscess of left leg, Cellulitis of left

## 2023-10-22 NOTE — PROGRESS NOTES
Intensive Care Progress Note    Patient: Polina Tafoya MRN: [id number]  Date of  Admission: 10/19/2023   YOB: 1950  Age: 68 y.o. Sex: female    Unit: [place] [address] CVU  Room/Bed: 0228/0228-01 Attending Physician: Albert Terrell MD   Admitting Physician: Gilma Lopez        Chief Complaint   No chief complaint on file. History Obtained From   electronic medical record     History of Present Illness   This is a 27-year-old female who was transferred from [facility] on 10/19/2023. Patient initially presented to [facility] ER on the evening of 10/16 for altered mental status. Patient has a known history of chronic nonhealing stage IV sacral ulcer. During the work-up at [facility] patient had evidence by MRI of coccygeal osteomyelitis and was transferred to the ICU requiring low-dose pressure support. Patient also had an issue of adrenal crisis and was given stress dose steroids. Patient had an acute arrest for ventricular fibrillation. Patient was coded. And transferred to [facility] for NSTEMI and then transferred from floor to ICU after code blue here at [facility]. Patient lethargic and on BiPAP therapy  Was able to talk to the family         Subjective:      No issues overnight  Patient is comfortable in bed  No issues swallowing  No chest pains or palpitations    Still awaiting the processing of the LifeVest for the patient  Having some insurance issues  Having some cough overnight  No nausea or vomiting  No dysuria hematuria          ROS:A comprehensive review of systems was negative except for: Above    Objective: Intake and Output:   Current Shift:   10/22 0701 - 10/22 1900  In: -   Out: 150 [Urine:150]  Last three shifts:   10/20 1901 - 10/22 0700  In: 1559.1 [P.O.:1400;  I.V.:10]  Out: 2610 [Urine:2610]    Vent settings for last 24 hours:  [unfilled]    Hemodynamic parameters for last 24 hours:  [unfilled]    Physical Exam:   Patient Vitals for acquisition. Cardiovascular  CAD status post CABG x3 in May 2021  CHF     Status post ventricular fibrillation cardiac arrest  Patient had ROSC with 2 minutes  Started on amiodarone  Cardiology consulted  Cardiac cath done 10/20/2023  Report pending  Apparently normal    EP cardiology to get LifeVest for patient     Continue with  Aspirin 81 mg daily  Lipitor 40 mg daily  Plavix 75 mg daily  Metoprolol xl 12.5 mg twice daily  Entresto 0.5 twice daily-on hold  Lasix 20 mg IV twice daily              Pulmonary  COPD, bronchiectasis  Chronic MAC  Obstructive sleep apnea  History of tobacco use  Tracheomalacia     Continue with  Heated high flow alternating with Pap therapy  Zyrtec 10 mg daily  Albuterol nebulized 4 times daily  Daliresp 500 mcg daily  Stiolto 2 puffs daily  Pulmicort-nebulized twice daily    Patient normally on triple therapy, Trelegy    We will add  Mucinex twice daily       Gastrointestinal   Continue with  Protonix 40 mg daily     Endo  Adrenal insufficiency  Patient has been given stress doses  Continue with  Hydrocortisone 50 mg IV every 12 hours-just recently dropped on 10/21/2023       Diabetes  Continue with  Lantus 30 units subcu twice daily  Sliding scale insulin     Renal  Making urine  Creatinine slightly elevated at 1.3, will follow       Replace K      Prophylaxis  Protonix          Lines  Peripheral IVs  PICC  [place]-we will DC this     Code Status: Full code        Rheumatological  Patient has rheumatoid arthritis    Normally on prednisone 4 mg and abatacept              Infectious disease  Coccygeal osteomyelitis  Surgical consult  May need debridement  Continue with        Chronic MAC/colonized with Serratia  Continue with  Azithromycin daily at home  Levofloxacin 500 mg p.o. daily       Discussed at multidisciplinary rounds with dietitian, pharmacy and nursing  Discussed with family at the bedside           [address] Provided:   This patient had a critical illness or

## 2023-10-23 LAB
ANION GAP SERPL CALCULATED.3IONS-SCNC: 6 MMOL/L (ref 3–16)
BUN SERPL-MCNC: 22 MG/DL (ref 7–20)
CALCIUM SERPL-MCNC: 8.5 MG/DL (ref 8.3–10.6)
CHLORIDE SERPL-SCNC: 100 MMOL/L (ref 99–110)
CO2 SERPL-SCNC: 32 MMOL/L (ref 21–32)
CREAT SERPL-MCNC: 1.1 MG/DL (ref 0.6–1.2)
GFR SERPLBLD CREATININE-BSD FMLA CKD-EPI: 53 ML/MIN/{1.73_M2}
GLUCOSE BLD-MCNC: 106 MG/DL (ref 70–99)
GLUCOSE BLD-MCNC: 107 MG/DL (ref 70–99)
GLUCOSE BLD-MCNC: 188 MG/DL (ref 70–99)
GLUCOSE BLD-MCNC: 197 MG/DL (ref 70–99)
GLUCOSE BLD-MCNC: 297 MG/DL (ref 70–99)
GLUCOSE BLD-MCNC: 35 MG/DL (ref 70–99)
GLUCOSE BLD-MCNC: 48 MG/DL (ref 70–99)
GLUCOSE BLD-MCNC: 81 MG/DL (ref 70–99)
GLUCOSE SERPL-MCNC: 98 MG/DL (ref 70–99)
MAGNESIUM SERPL-MCNC: 2 MG/DL (ref 1.8–2.4)
PERFORMED ON: ABNORMAL
PERFORMED ON: NORMAL
POTASSIUM SERPL-SCNC: 3.5 MMOL/L (ref 3.5–5.1)
SODIUM SERPL-SCNC: 138 MMOL/L (ref 136–145)

## 2023-10-23 PROCEDURE — 99233 SBSQ HOSP IP/OBS HIGH 50: CPT | Performed by: INTERNAL MEDICINE

## 2023-10-23 PROCEDURE — 36592 COLLECT BLOOD FROM PICC: CPT

## 2023-10-23 PROCEDURE — 6370000000 HC RX 637 (ALT 250 FOR IP): Performed by: NURSE PRACTITIONER

## 2023-10-23 PROCEDURE — 92612 ENDOSCOPY SWALLOW (FEES) VID: CPT

## 2023-10-23 PROCEDURE — 83550 IRON BINDING TEST: CPT

## 2023-10-23 PROCEDURE — 6370000000 HC RX 637 (ALT 250 FOR IP): Performed by: INTERNAL MEDICINE

## 2023-10-23 PROCEDURE — 94761 N-INVAS EAR/PLS OXIMETRY MLT: CPT

## 2023-10-23 PROCEDURE — 6360000002 HC RX W HCPCS: Performed by: INTERNAL MEDICINE

## 2023-10-23 PROCEDURE — 6360000002 HC RX W HCPCS: Performed by: NURSE PRACTITIONER

## 2023-10-23 PROCEDURE — 99232 SBSQ HOSP IP/OBS MODERATE 35: CPT | Performed by: NURSE PRACTITIONER

## 2023-10-23 PROCEDURE — 2000000000 HC ICU R&B

## 2023-10-23 PROCEDURE — 2700000000 HC OXYGEN THERAPY PER DAY

## 2023-10-23 PROCEDURE — 2580000003 HC RX 258: Performed by: INTERNAL MEDICINE

## 2023-10-23 PROCEDURE — 83540 ASSAY OF IRON: CPT

## 2023-10-23 PROCEDURE — 97535 SELF CARE MNGMENT TRAINING: CPT

## 2023-10-23 PROCEDURE — 97530 THERAPEUTIC ACTIVITIES: CPT

## 2023-10-23 PROCEDURE — 94640 AIRWAY INHALATION TREATMENT: CPT

## 2023-10-23 PROCEDURE — 80048 BASIC METABOLIC PNL TOTAL CA: CPT

## 2023-10-23 PROCEDURE — 92526 ORAL FUNCTION THERAPY: CPT

## 2023-10-23 PROCEDURE — 83735 ASSAY OF MAGNESIUM: CPT

## 2023-10-23 RX ORDER — PREDNISONE 1 MG/1
4 TABLET ORAL DAILY
COMMUNITY
End: 2023-11-01

## 2023-10-23 RX ORDER — ENOXAPARIN SODIUM 100 MG/ML
40 INJECTION SUBCUTANEOUS DAILY
Status: DISCONTINUED | OUTPATIENT
Start: 2023-10-23 | End: 2023-10-28 | Stop reason: HOSPADM

## 2023-10-23 RX ORDER — INSULIN GLARGINE 100 [IU]/ML
15 INJECTION, SOLUTION SUBCUTANEOUS 2 TIMES DAILY
Status: DISCONTINUED | OUTPATIENT
Start: 2023-10-23 | End: 2023-10-23

## 2023-10-23 RX ORDER — INSULIN GLARGINE 100 [IU]/ML
15 INJECTION, SOLUTION SUBCUTANEOUS 2 TIMES DAILY
Status: DISCONTINUED | OUTPATIENT
Start: 2023-10-23 | End: 2023-10-26

## 2023-10-23 RX ADMIN — FUROSEMIDE 20 MG: 10 INJECTION, SOLUTION INTRAMUSCULAR; INTRAVENOUS at 18:20

## 2023-10-23 RX ADMIN — GABAPENTIN 300 MG: 300 CAPSULE ORAL at 09:44

## 2023-10-23 RX ADMIN — DULOXETINE HYDROCHLORIDE 60 MG: 60 CAPSULE, DELAYED RELEASE ORAL at 09:43

## 2023-10-23 RX ADMIN — HYDROCORTISONE SODIUM SUCCINATE 50 MG: 100 INJECTION, POWDER, FOR SOLUTION INTRAMUSCULAR; INTRAVENOUS at 19:56

## 2023-10-23 RX ADMIN — MORPHINE SULFATE 15 MG: 15 TABLET, FILM COATED, EXTENDED RELEASE ORAL at 09:43

## 2023-10-23 RX ADMIN — GABAPENTIN 300 MG: 300 CAPSULE ORAL at 19:59

## 2023-10-23 RX ADMIN — Medication 10 ML: at 19:56

## 2023-10-23 RX ADMIN — OXYCODONE HYDROCHLORIDE 5 MG: 5 TABLET ORAL at 16:50

## 2023-10-23 RX ADMIN — CETIRIZINE HYDROCHLORIDE 10 MG: 10 TABLET, FILM COATED ORAL at 09:44

## 2023-10-23 RX ADMIN — LEVOFLOXACIN 750 MG: 500 TABLET, FILM COATED ORAL at 09:46

## 2023-10-23 RX ADMIN — ATORVASTATIN CALCIUM 40 MG: 40 TABLET, FILM COATED ORAL at 09:44

## 2023-10-23 RX ADMIN — Medication 10 ML: at 09:47

## 2023-10-23 RX ADMIN — GUAIFENESIN 600 MG: 600 TABLET ORAL at 19:59

## 2023-10-23 RX ADMIN — FUROSEMIDE 20 MG: 10 INJECTION, SOLUTION INTRAMUSCULAR; INTRAVENOUS at 09:44

## 2023-10-23 RX ADMIN — MORPHINE SULFATE 15 MG: 15 TABLET, FILM COATED, EXTENDED RELEASE ORAL at 19:59

## 2023-10-23 RX ADMIN — Medication: at 20:10

## 2023-10-23 RX ADMIN — DULOXETINE HYDROCHLORIDE 60 MG: 60 CAPSULE, DELAYED RELEASE ORAL at 19:58

## 2023-10-23 RX ADMIN — INSULIN GLARGINE 15 UNITS: 100 INJECTION, SOLUTION SUBCUTANEOUS at 19:55

## 2023-10-23 RX ADMIN — INSULIN LISPRO 8 UNITS: 100 INJECTION, SOLUTION INTRAVENOUS; SUBCUTANEOUS at 12:11

## 2023-10-23 RX ADMIN — METOPROLOL SUCCINATE 12.5 MG: 25 TABLET, EXTENDED RELEASE ORAL at 09:43

## 2023-10-23 RX ADMIN — OXYCODONE HYDROCHLORIDE 5 MG: 5 TABLET ORAL at 12:08

## 2023-10-23 RX ADMIN — ASPIRIN 81 MG: 81 TABLET, CHEWABLE ORAL at 09:42

## 2023-10-23 RX ADMIN — BUDESONIDE 500 MCG: 0.5 SUSPENSION RESPIRATORY (INHALATION) at 19:42

## 2023-10-23 RX ADMIN — ROFLUMILAST 500 MCG: 500 TABLET ORAL at 09:47

## 2023-10-23 RX ADMIN — HYDROCORTISONE SODIUM SUCCINATE 50 MG: 100 INJECTION, POWDER, FOR SOLUTION INTRAMUSCULAR; INTRAVENOUS at 09:41

## 2023-10-23 RX ADMIN — PANTOPRAZOLE SODIUM 40 MG: 40 TABLET, DELAYED RELEASE ORAL at 06:21

## 2023-10-23 RX ADMIN — INSULIN GLARGINE 15 UNITS: 100 INJECTION, SOLUTION SUBCUTANEOUS at 13:31

## 2023-10-23 RX ADMIN — CLOPIDOGREL BISULFATE 75 MG: 75 TABLET ORAL at 09:43

## 2023-10-23 RX ADMIN — ENOXAPARIN SODIUM 40 MG: 100 INJECTION SUBCUTANEOUS at 13:31

## 2023-10-23 RX ADMIN — GUAIFENESIN 600 MG: 600 TABLET ORAL at 09:43

## 2023-10-23 ASSESSMENT — PAIN DESCRIPTION - LOCATION
LOCATION: BACK

## 2023-10-23 ASSESSMENT — PAIN SCALES - GENERAL
PAINLEVEL_OUTOF10: 7
PAINLEVEL_OUTOF10: 10
PAINLEVEL_OUTOF10: 8
PAINLEVEL_OUTOF10: 7
PAINLEVEL_OUTOF10: 6

## 2023-10-23 ASSESSMENT — PAIN DESCRIPTION - DESCRIPTORS
DESCRIPTORS: DISCOMFORT

## 2023-10-23 ASSESSMENT — PAIN - FUNCTIONAL ASSESSMENT: PAIN_FUNCTIONAL_ASSESSMENT: ACTIVITIES ARE NOT PREVENTED

## 2023-10-23 ASSESSMENT — PAIN DESCRIPTION - ORIENTATION: ORIENTATION: RIGHT

## 2023-10-23 ASSESSMENT — PAIN SCALES - WONG BAKER
WONGBAKER_NUMERICALRESPONSE: 0
WONGBAKER_NUMERICALRESPONSE: 0

## 2023-10-23 ASSESSMENT — PAIN DESCRIPTION - FREQUENCY: FREQUENCY: CONTINUOUS

## 2023-10-23 ASSESSMENT — PAIN DESCRIPTION - PAIN TYPE: TYPE: CHRONIC PAIN

## 2023-10-23 ASSESSMENT — PAIN DESCRIPTION - ONSET: ONSET: ON-GOING

## 2023-10-23 NOTE — PLAN OF CARE
Problem: Safety - Adult  Goal: Free from fall injury  Outcome: Progressing     Problem: Chronic Conditions and Co-morbidities  Goal: Patient's chronic conditions and co-morbidity symptoms are monitored and maintained or improved  Outcome: Progressing     Problem: Discharge Planning  Goal: Discharge to home or other facility with appropriate resources  Outcome: Progressing     Problem: Respiratory - Adult  Goal: Achieves optimal ventilation and oxygenation  Outcome: Progressing     Problem: Cardiovascular - Adult  Goal: Maintains optimal cardiac output and hemodynamic stability  Outcome: Progressing  Goal: Absence of cardiac dysrhythmias or at baseline  Outcome: Progressing     Problem: Skin/Tissue Integrity - Adult  Goal: Incisions, wounds, or drain sites healing without S/S of infection  Outcome: Progressing     Problem: Genitourinary - Adult  Goal: Urinary catheter remains patent  Outcome: Progressing     Problem: Metabolic/Fluid and Electrolytes - Adult  Goal: Glucose maintained within prescribed range  Outcome: Progressing     Problem: Nutrition Deficit:  Goal: Optimize nutritional status  Outcome: Progressing     Problem: Pain  Goal: Verbalizes/displays adequate comfort level or baseline comfort level  Outcome: Progressing     Problem: Skin/Tissue Integrity  Goal: Absence of new skin breakdown  Description: 1. Monitor for areas of redness and/or skin breakdown  2. Assess vascular access sites hourly  3. Every 4-6 hours minimum:  Change oxygen saturation probe site  4. Every 4-6 hours:  If on nasal continuous positive airway pressure, respiratory therapy assess nares and determine need for appliance change or resting period.   Outcome: Progressing     Problem: ABCDS Injury Assessment  Goal: Absence of physical injury  Outcome: Progressing

## 2023-10-23 NOTE — PROGRESS NOTES
Pt states she if not going to wear the bipap this evening. She coughed too much last night wearing it.

## 2023-10-23 NOTE — PROGRESS NOTES
Comprehensive Nutrition Assessment    Type and Reason for Visit:  Reassess    Nutrition Recommendations/Plan:   Continue CC5 diet and encourage po intakes  Continue glucerna w meals  Diet/liquid texture per SLP  Monitor nutrition adequacy, pertinent labs, bowel habits, wt changes, and clinical progress     Malnutrition Assessment:  Malnutrition Status: At risk for malnutrition (Comment) (10/23/23 8814)      Nutrition Assessment:    Follow up: Diet advanced to regular 10/21. SLP eval 10/21 recommending regular solids with thin liquids w FEES follow up. RD modifed diet to CC w glucernas today d/t elevated BG. PO intakes majority % per EMR. Pt undergoing FEES at time of visit. Encourage po and protein intakes for wound healing, will continue to monitor. Nutrition Related Findings:    BG  x24 hrs. No BM, +constipation, on bowel reg. +2 pitting generalized edema. +1 pitting BLE edema. Wound Type: Pressure Injury, Stage III (Pressure injury stage 4)       Current Nutrition Intake & Therapies:    Average Meal Intake: 0%, 26-50%, 51-75%, % (x1 0%)  Average Supplements Intake: Unable to assess  ADULT DIET; Regular; 5 carb choices (75 gm/meal)  ADULT ORAL NUTRITION SUPPLEMENT; Breakfast, Dinner, Lunch; Diabetic Oral Supplement    Anthropometric Measures:  Height: 170.2 cm (5' 7.01\")  Ideal Body Weight (IBW): 135 lbs (61 kg)       Current Body Weight: 85.8 kg (189 lb 2.5 oz), 138.8 % IBW. Weight Source: Standing Scale  Current BMI (kg/m2): 29.6        Weight Adjustment For: No Adjustment                 BMI Categories: Overweight (BMI 25.0-29. 9)    Estimated Daily Nutrient Needs:  Energy Requirements Based On: Kcal/kg (28-33 kcals/kg)  Weight Used for Energy Requirements: Ideal (61 kg)  Energy (kcal/day): 9232-3990  Weight Used for Protein Requirements: Ideal (1.2-1.5 g/kg)  Protein (g/day): 73-92 g  Method Used for Fluid Requirements: 1 ml/kcal  Fluid (ml/day): 4242-1802 ml    Nutrition Diagnosis: Inadequate oral intake related to inadequate protein-energy intake, increase demand for energy/nutrients, impaired respiratory function as evidenced by poor intake prior to admission, wounds, intake 0-25%    Nutrition Interventions:   Food and/or Nutrient Delivery: Continue Current Diet, Continue Oral Nutrition Supplement  Nutrition Education/Counseling: Education not appropriate  Coordination of Nutrition Care: Continue to monitor while inpatient, Interdisciplinary Rounds       Goals:  Previous Goal Met: Progressing toward Goal(s)  Goals: PO intake 50% or greater, prior to discharge       Nutrition Monitoring and Evaluation:   Behavioral-Environmental Outcomes: None Identified  Food/Nutrient Intake Outcomes: Food and Nutrient Intake, Supplement Intake  Physical Signs/Symptoms Outcomes: Chewing or Swallowing, Biochemical Data, Nutrition Focused Physical Findings, Weight    Discharge Planning:    Continue current diet, Continue Oral Nutrition Supplement     Paul Mchugh, [id number] [facility]  Contact: Office: [phone]; [address] [place] [address] Road: [phone]

## 2023-10-23 NOTE — PLAN OF CARE
CHF Care Plan      Patient's EF (Ejection Fraction) is less than 40%    Heart Failure Medications:  Diuretics[de-identified] Furosemide    (One of the following REQUIRED for EF </= 40%/SYSTOLIC FAILURE but MAY be used in EF% >40%/DIASTOLIC FAILURE)        ACE[de-identified] None        ARB[de-identified] None         ARNI[de-identified] Sacubitril/Valsartan-Entresto    (Beta Blockers)  NON- Evidenced Based Beta Blocker (for EF% >40%/DIASTOLIC FAILURE): None    Evidenced Based Beta Blocker::(REQUIRED for EF% <40%/SYSTOLIC FAILURE) Metoprolol SUCCinate- Toprol XL  . .................................................................................................................................................. Healthy Weight Tracking - BMI + Meds 10/18/2023 10/19/2023 10/20/2023 10/21/2023 10/22/2023 10/22/2023 10/23/2023   Weight 187 lb 6.3 oz 188 lb 7.9 oz 191 lb 12.8 oz 189 lb 2.5 oz 179 lb 10.8 oz 189 lb 2.5 oz 189 lb 2.5 oz   Height 5' 7.008\" 5' 7.008\" - - - - -   Body Mass Index 29.35 kg/m2 - 30.03 kg/m2 29.62 kg/m2 28.13 kg/m2 29.62 kg/m2 29.62 kg/m2   Some recent data might be hidden         Patient's weights and intake/output reviewed: Yes    Daily Weight log at bedside, patient/family participation in use of log: \"yes    Patient's Last Weight: 81.5 kg obtained by bed scale. Difference of 0kg less than last documented weight.       Intake/Output Summary (Last 24 hours) at 10/23/2023 0651  Last data filed at 10/23/2023 6074  Gross per 24 hour   Intake 1080 ml   Output 1100 ml   Net -20 ml       Education Booklet Provided: yes    Comorbidities Reviewed Yes    Patient has a past medical history of Acute on chronic diastolic heart failure due to coronary artery disease ([address]), Acute respiratory failure with hypoxia ([address]), Atherosclerosis of native artery of right lower extremity with rest pain ([address]), Back pain, Branch retinal vein occlusion, Bronchiectasis with acute exacerbation ([address]), Candidal dermatitis, Cellulitis and abscess of left leg, Cellulitis of left lower extremity, CHF (congestive heart failure) ([facility]), Closed compression fracture of thoracic vertebra (HCC), Closed fracture of facial bone with routine healing, Closed jaw fracture ([address]), Community acquired pneumonia of left lower lobe of lung, Compression fracture of L1 lumbar vertebra (HCC), COPD (chronic obstructive pulmonary disease) ([address]), COPD exacerbation ([address]), COVID, Diabetes mellitus ([address]), Fracture of tibial plateau, closed, left, initial encounter, HCAP (healthcare-associated pneumonia), Minimally displaced zone I fracture of sacrum ([address]), MRSA (methicillin resistant staph aureus) culture positive, MRSA (methicillin resistant Staphylococcus aureus), Mucus plugging of bronchi, NSTEMI (non-ST elevated myocardial infarction) ([address]), Osteomyelitis of mandible, Osteoporosis with pathological fracture, Pneumonia of left lower lobe due to infectious organism, Post herpetic neuralgia, Pressure ulcer of left heel, stage 3 ([address]), Proximal humerus fracture, Rheumatoid arthritis ([address]), Shingles, Sleep apnea, Status post incision and drainage, Surgical wound dehiscence, initial encounter (at LLE SVG harvest site), Temporal arteritis ([address]), Tobacco use, Tracheomalacia, and Vitreous hemorrhage, right eye ([address]). >>For CHF and Comorbidity documentation on Education Time and Topics, please see Education Tab    Progressive Mobility Assessment:  What is this patient's Current Level of Mobility?: Ambulatory-Up Ad Estela  How was this patient Mobilized today?: Edge of Bed and Up in Room, ambulated 5ft                 With Whom? Nurse, PCA, PT, OT, and Self                 Level of Difficulty/Assistance: 2x Assist     Pt resting In chair at this time on  1 L O2. Pt with complaints of shortness of breath. Pt with nonpitting lower extremity edema.      Patient and/or Family's stated Goal of Care this Admission:  prior to discharge        :

## 2023-10-23 NOTE — PROGRESS NOTES
V2.0    [facility] [place]      Name:  Cierra Pollock /Age/Sex: 1950  (68 y.o. female)   MRN & CSN:  [id number] & [id number] Encounter Date/Time: 10/23/2023 12:00 PM EDT   Location:  [facility]8/0228-01 PCP: MD Pearl Hwang MD       Hospital Day: 5    Assessment and Recommendations   Cierra Pollock is a 68 y.o. female with pmh of hypertension, CAD (CABG), BIMAL clip, cardiomyopathy (EF has improved from 30% to 50%), COPD/bronchiectasis, tracheomalacia, obstructive sleep apnea, diabetes type 2, rheumatoid arthritis (on prednisone 4 mg daily), CKD 3 and chronic nonhealing stage IV pressure ulcer admitted and [facility] on 10/1640 follows:  1. Nonhealing stage IV pressure ulcer-MRI consistent with osteomyelitis. ID consulted-continue wound care, no need for antibiotics for chronic wound. 2.  Hypotension: Status post pressors. This is due to adrenal crisis. Currently on IV hydrocortisone. 3.  Bronchiectasis: Critical care/pulmonology did bronchoscopy. BAL 10/13 grew Serratia. Started on Levaquin. 4.  Elevated troponin: Started on heparin drip and transferred here for cardiology evaluation. However, patient went into V-fib cardiac arrest when patient arrived here on 10/18 requiring CPR only. ROSC attained less than 2 minutes. 1.  V-fib cardiac arrest: Status post amiodarone drip. Cardiology consult appreciated. Possible non-ST elevation MI.  CT scan of the head-no acute infarct. 2.  Non-ST elevation MI: s/p  heparin drip. Status post nitroglycerin drip. Cardiac catheterization on 10/20-occluded SVG to OM. Medical management recommended. On metoprolol, aspirin, Plavix and Lipitor. 3.  Cardiomyopathy: Echo 10/19-EF 30% with regional wall motion abnormality. On IV Lasix 20 mg twice daily. Hold off Entresto for now because of recent hypotension. Continue metoprolol. LifeVest before discharge.   Po lasix tomorrow , hold off on implanted ICD at Reason for Exam: AMS FINDINGS: BRAIN/VENTRICLES: There is no acute intracranial hemorrhage, mass effect or midline shift. No abnormal extra-axial fluid collection. The gray-white differentiation is maintained without evidence of an acute infarct. There is no evidence of hydrocephalus. ORBITS: The visualized portion of the orbits demonstrate no acute abnormality. SINUSES: The visualized paranasal sinuses and mastoid air cells demonstrate no acute abnormality. SOFT TISSUES/SKULL:  No acute abnormality of the visualized skull or soft tissues. No acute intracranial abnormality. MRI SACRUM COCCYX WO CONTRAST    Result Date: 10/16/2023  EXAMINATION: MRI OF THE SACRUM/SI JOINT WITHOUT CONTRAST, 10/16/2023 4:28 pm TECHNIQUE: Multiplanar multisequence MRI of the sacrum/si joint was performed without the administration of intravenous contrast. COMPARISON: Sacrum/coccyx plain radiographs from 10/10/2023 HISTORY: ORDERING SYSTEM PROVIDED HISTORY: Pressure ulcer of coccygeal region, stage 4 ([address]) TECHNOLOGIST PROVIDED HISTORY: Reason for exam:->stage 4 sacral pressure ulcer, increased pain and drainage, suspect osteomyelitis, XR negative 26-year-old female with pressure ulcer of the coccygeal region, stage IV; suspect osteomyelitis FINDINGS: SOFT TISSUES: Ulceration and gas containing sinus tract superficial to the lower coccyx on sagittal image 21, series 5, and image 21, series 4. Moderate edema and fluid in the subcutaneous fat along the midline posterior back. Severe atrophy and fatty degeneration of the paraspinal musculature. BONE MARROW: Subjacent to the ulceration and gas containing sinus tract, there is mild subjacent hyperintense STIR signal and low T1 signal on image 19, series 4, and series 5, within the mid coccyx also seen on image 14, series 9, and series 8, concerning for early osteomyelitis. Visualized sacral foramen are grossly unremarkable. Probable remote compression deformity of L5.   Mild to 12:00 PM    BACTERIA 1+ 10/16/2023 09:15 PM    CLARITYU Clear 10/16/2023 09:15 PM    SPECGRAV 1.010 10/16/2023 09:15 PM    LEUKOCYTESUR Negative 10/16/2023 09:15 PM    UROBILINOGEN 0.2 10/16/2023 09:15 PM    BILIRUBINUR Negative 10/16/2023 09:15 PM    BILIRUBINUR NEGATIVE 03/20/2010 12:52 PM    BLOODU TRACE-INTACT 10/16/2023 09:15 PM    GLUCOSEU Negative 10/16/2023 09:15 PM    GLUCOSEU NEGATIVE 03/20/2010 12:52 PM    KETUA Negative 10/16/2023 09:15 PM     Urine Cultures:   Lab Results   Component Value Date/Time    LABURIN No growth at 18 to 36 hours 10/16/2023 09:15 PM     Blood Cultures:   Lab Results   Component Value Date/Time    BC No Growth after 4 days of incubation. 10/16/2023 07:15 PM     Lab Results   Component Value Date/Time    BLOODCULT2 No Growth after 4 days of incubation.  10/16/2023 07:36 PM     Organism:   Lab Results   Component Value Date/Time    ORG Serratia marcescens 10/13/2023 11:45 AM    ORG Zoe dubliniensis 10/13/2023 11:45 AM    ORG Serratia marcescens 10/13/2023 11:45 AM    ORG Candida albicans 10/13/2023 11:45 AM         Electronically signed by Alex Hein MD on 10/23/2023 at 3:57 PM

## 2023-10-23 NOTE — PROGRESS NOTES
Patient's blood sugar checked and read 48. Patient had breakfast at bedside, she ate entire breakfast and two cups of apple juice. RN rechecked patient's blood sugar via finger stick and read at 35. Patient is awake and oriented at this time, she does not feel symptomatic. RN checked blood sugar via central line and resulted at 188. VERNON Ahn aware. Will monitor.     Oliva Wilkins RN

## 2023-10-23 NOTE — PLAN OF CARE
VSS. NSR on monitor. Patient is alert and oriented. Patient complains on chronic back pain, prn oxycodone and scheduled morphine given with some relief. Patient on regular carb control diet, tolerating well. Patient had fees complete at bedside today, see results. Up to chair throughout the day. Ambulated to restroom with walker x1 assist, tolerated well. Medication for DVT prophylaxis. Free from any injury. Patient and family updated on plan of care. Will continue plan of care.       Problem: Safety - Adult  Goal: Free from fall injury  Outcome: Progressing     Problem: Chronic Conditions and Co-morbidities  Goal: Patient's chronic conditions and co-morbidity symptoms are monitored and maintained or improved  Outcome: Progressing  Flowsheets (Taken 10/23/2023 0830)  Care Plan - Patient's Chronic Conditions and Co-Morbidity Symptoms are Monitored and Maintained or Improved: Monitor and assess patient's chronic conditions and comorbid symptoms for stability, deterioration, or improvement     Problem: Discharge Planning  Goal: Discharge to home or other facility with appropriate resources  Outcome: Progressing  Flowsheets (Taken 10/23/2023 0830)  Discharge to home or other facility with appropriate resources:   Identify barriers to discharge with patient and caregiver   Arrange for needed discharge resources and transportation as appropriate   Identify discharge learning needs (meds, wound care, etc)     Problem: Respiratory - Adult  Goal: Achieves optimal ventilation and oxygenation  Outcome: Progressing     Problem: Cardiovascular - Adult  Goal: Maintains optimal cardiac output and hemodynamic stability  Outcome: Progressing  Flowsheets (Taken 10/23/2023 0830)  Maintains optimal cardiac output and hemodynamic stability: Monitor blood pressure and heart rate  Goal: Absence of cardiac dysrhythmias or at baseline  Outcome: Progressing  Flowsheets (Taken 10/23/2023 0830)  Absence of cardiac dysrhythmias or at baseline: Monitor cardiac rate and rhythm     Problem: Skin/Tissue Integrity - Adult  Goal: Incisions, wounds, or drain sites healing without S/S of infection  Outcome: Progressing     Problem: Genitourinary - Adult  Goal: Urinary catheter remains patent  Outcome: Progressing     Problem: Metabolic/Fluid and Electrolytes - Adult  Goal: Glucose maintained within prescribed range  Outcome: Progressing  Flowsheets (Taken 10/23/2023 0830)  Glucose maintained within prescribed range:   Monitor blood glucose as ordered   Assess for signs and symptoms of hyperglycemia and hypoglycemia     Problem: Nutrition Deficit:  Goal: Optimize nutritional status  Outcome: Progressing     Problem: Pain  Goal: Verbalizes/displays adequate comfort level or baseline comfort level  Outcome: Progressing     Problem: Skin/Tissue Integrity  Goal: Absence of new skin breakdown  Description: 1. Monitor for areas of redness and/or skin breakdown  2. Assess vascular access sites hourly  3. Every 4-6 hours minimum:  Change oxygen saturation probe site  4. Every 4-6 hours:  If on nasal continuous positive airway pressure, respiratory therapy assess nares and determine need for appliance change or resting period.   Outcome: Progressing     Problem: ABCDS Injury Assessment  Goal: Absence of physical injury  Outcome: Progressing

## 2023-10-23 NOTE — PLAN OF CARE
CHF Care Plan      Patient's EF (Ejection Fraction) is less than 40%    Heart Failure Medications:  Diuretics[de-identified] Furosemide    (One of the following REQUIRED for EF </= 40%/SYSTOLIC FAILURE but MAY be used in EF% >40%/DIASTOLIC FAILURE)        ACE[de-identified] None        ARB[de-identified] None         ARNI[de-identified] Sacubitril/Valsartan-Entresto    (Beta Blockers)  NON- Evidenced Based Beta Blocker (for EF% >40%/DIASTOLIC FAILURE): None    Evidenced Based Beta Blocker::(REQUIRED for EF% <40%/SYSTOLIC FAILURE) Metoprolol SUCCinate- Toprol XL  . .................................................................................................................................................. Healthy Weight Tracking - BMI + Meds 10/18/2023 10/19/2023 10/20/2023 10/21/2023 10/22/2023 10/22/2023 10/23/2023   Weight 187 lb 6.3 oz 188 lb 7.9 oz 191 lb 12.8 oz 189 lb 2.5 oz 179 lb 10.8 oz 189 lb 2.5 oz 189 lb 2.5 oz   Height 5' 7.008\" 5' 7.008\" - - - - -   Body Mass Index 29.35 kg/m2 - 30.03 kg/m2 29.62 kg/m2 28.13 kg/m2 29.62 kg/m2 29.62 kg/m2   Some recent data might be hidden         Patient's weights and intake/output reviewed: Yes    Daily Weight log at bedside, patient/family participation in use of log: \"yes    Patient's Last Weight: 85.8kg obtained by bed scale.      Intake/Output Summary (Last 24 hours) at 10/23/2023 1911  Last data filed at 10/23/2023 1911  Gross per 24 hour   Intake 1140 ml   Output 1300 ml   Net -160 ml         Education Booklet Provided: yes    Comorbidities Reviewed Yes    Patient has a past medical history of Acute on chronic diastolic heart failure due to coronary artery disease ([address]), Acute respiratory failure with hypoxia ([address]), Atherosclerosis of native artery of right lower extremity with rest pain ([address]), Back pain, Branch retinal vein occlusion, Bronchiectasis with acute exacerbation ([address]), Candidal dermatitis, Cellulitis and abscess of left leg, Cellulitis of left lower extremity, CHF (congestive heart failure) [facility]), Closed compression fracture of thoracic vertebra ([address]), Closed fracture of facial bone with routine healing, Closed jaw fracture ([address]), Community acquired pneumonia of left lower lobe of lung, Compression fracture of L1 lumbar vertebra (HCC), COPD (chronic obstructive pulmonary disease) ([address]), COPD exacerbation ([address]), COVID, Diabetes mellitus ([address]), Fracture of tibial plateau, closed, left, initial encounter, HCAP (healthcare-associated pneumonia), Minimally displaced zone I fracture of sacrum ([address]), MRSA (methicillin resistant staph aureus) culture positive, MRSA (methicillin resistant Staphylococcus aureus), Mucus plugging of bronchi, NSTEMI (non-ST elevated myocardial infarction) ([address]), Osteomyelitis of mandible, Osteoporosis with pathological fracture, Pneumonia of left lower lobe due to infectious organism, Post herpetic neuralgia, Pressure ulcer of left heel, stage 3 ([address]), Proximal humerus fracture, Rheumatoid arthritis ([address]), Shingles, Sleep apnea, Status post incision and drainage, Surgical wound dehiscence, initial encounter (at LLE SVG harvest site), Temporal arteritis ([address]), Tobacco use, Tracheomalacia, and Vitreous hemorrhage, right eye ([address]). >>For CHF and Comorbidity documentation on Education Time and Topics, please see Education Tab    Progressive Mobility Assessment:  What is this patient's Current Level of Mobility?: up with walker x1  How was this patient Mobilized today?: up to chair, ambulated to restroom with walker                 With Whom? RN and therapy                 Level of Difficulty/Assistance: x1 assist     Pt resting In chair at this time on  2 L O2. Pt with complaints of shortness of breath. Pt with nonpitting lower extremity edema.      Patient and/or Family's stated Goal of Care this Admission:  prior to discharge        :

## 2023-10-23 NOTE — CARE COORDINATION
LOS 4. Care managed by [address] [place] [address]. S/P Cath. From home alone- may stay w dtr if needed. Plans home w [facility] [place]- call to agency- currently active- they can resume care at [place]. Life vest has been requested. Pt's insurance company has denied to pay for [employer] life vest at this time. Per prev note- They need documentation that a cardiac arrest occurred before MI or 48 hours after MI, or an ejection fraction <35%. Note can not state 35-40%. Please contact the [id number] [employer] representative (Jen) for further information. Her number is [phone]. Discussed w Pulm NP and w RN. CM following.   Sunil Manriquez RN

## 2023-10-23 NOTE — PROGRESS NOTES
Occupational Therapy  Facility/Department: [facility] C2 CARD TELEMETRY  Daily Treatment Note  NAME: Jackelin Murphy  : 1950  MRN: [id number]    Date of Service: 10/23/2023    Discharge Recommendations:  24 hour supervision or assist, Home with Home health OT  OT Equipment Recommendations  Equipment Needed: No      Patient Diagnosis(es): The encounter diagnosis was Ventricular tachyarrhythmia ([address]). Assessment    Assessment: Pt tolerated therapy session well this date. Pt required frequent rest breaks 2/2 O2 stats dropping throughout session. Pt returned to above 90% at EOS with RN aware. Pt completed grooming tasks at sink while seated. Pt required Min A for sit<>stands. Pt would continue to benefit from acute OT at this time to improve functional mobility and ADL independence. D/c recs for HHOT and 24/7 SVP when medically ready. Activity Tolerance: Patient tolerated treatment well  Discharge Recommendations: 24 hour supervision or assist;Home with Home health OT  Equipment Needed: No      Plan   Occupational Therapy Plan  Times Per Week: 4-6x/wk  Current Treatment Recommendations: Strengthening;Balance training;Functional mobility training; Endurance training; Safety education & training;Equipment evaluation, education, & procurement;Self-Care / ADL     Restrictions  Restrictions/Precautions  Restrictions/Precautions: Fall Risk;General Precautions  Required Braces or Orthoses?: No  Position Activity Restriction  Other position/activity restrictions: tele, 1L O2    Subjective   Subjective  Subjective: Pt in chair upon arrival, agreeable to therapy session this AM  Orientation  Overall Orientation Status: Within Normal Limits  Orientation Level: Oriented X4  Pain: Pt stated \"just my normal back pain\" did not rate           Objective    Vitals  Vitals  Pulse: 76  Heart Rate Source: Monitor  BP: (!) 118/56  BP Location: Left upper arm  BP Method: Automatic  Patient Position: Up in chair  MAP (Calculated): 77  SpO2: 96 %  O2 Device: Nasal cannula  Comment: 1L O2  Bed Mobility Training  Bed Mobility Training: No  Balance  Sitting: With support  Standing: Impaired  Standing - Static: Constant support; Fair  Standing - Dynamic: Constant support; Fair  Transfer Training  Transfer Training: Yes  Sit to Stand: Minimum assistance;Assist X1;Adaptive equipment; Additional time (at recliner chair, with Rollator)  Stand to Sit: Minimum assistance;Assist X1     ADL  Feeding: Independent  Grooming: Stand by assistance  Grooming Skilled Clinical Factors: Pt completed seated grooming tasks at sink including oral care and hair brushing  Toileting: Maximum assistance  Toileting Skilled Clinical Factors: purewick  Additional Comments: declined further ADLs  OT Exercises  Exercise Treatment: hand grasp/release ~30 seconds, elbow flexion/extension     Safety Devices  Type of Devices: All omid prominences offloaded; All fall risk precautions in place;Gait belt;Call light within reach;Nurse notified; Left in chair;Heels elevated for pressure relief  Restraints  Restraints Initially in Place: No     Patient Education  Education Given To: Patient  Education Provided: Role of Therapy;Plan of Care;Transfer Training; Fall Prevention Strategies; Energy Conservation  Education Provided Comments: PLB  Education Method: Verbal  Barriers to Learning: None  Education Outcome: Verbalized understanding;Demonstrated understanding  Disease Specific Education: Pt educated on importance of OOB mobility, prevention of complications of bedrest, and general safety during hospitalization.  Pt verbalized understanding    AM-PAC Daily Activity - Inpatient   How much help is needed for putting on and taking off regular lower body clothing?: A Lot  How much help is needed for bathing (which includes washing, rinsing, drying)?: A Little  How much help is needed for toileting (which includes using toilet, bedpan, or urinal)?: A Lot  How much help is needed for putting on and taking off regular upper body clothing?: A Little  How much help is needed for taking care of personal grooming?: A Little  How much help for eating meals?: None  AM-PAC Inpatient Daily Activity Raw Score: 17  AM-PAC Inpatient ADL T-Scale Score : 37.26  ADL Inpatient CMS 0-100% Score: 50.11  ADL Inpatient CMS G-Code Modifier : CK    Goals  Short Term Goals  Time Frame for Short Term Goals: 1 week (10/26/23) unless otherwise noted - All goals ongoing 10/23  Short Term Goal 1: Pt will perform functional/toilet transfer with SPV and LRAD by 10/25  Short Term Goal 2: Pt will perform UB ADL with set-up  Short Term Goal 3: Pt will perform LB dressing with Rose Marie and AE PRN  Short Term Goal 4: Pt will perform 1-2 grooming tasks seated EOB with set-up       Therapy Time   Individual Concurrent Group Co-treatment   Time In 0808         Time Out 0855         Minutes 47         Timed Code Treatment Minutes: [id number],

## 2023-10-23 NOTE — PROCEDURES
[facility]   Swallowing Disorders and Dysphagia  Fiberoptic Endoscopic Evaluation of Swallowing  (FEES)    RECOMMENDATIONS:  Diet Recommendation: IDDSI 7 Regular Solids (encouraged to choose softer foods); IDDSI 0 Thin Liquids; Meds PO as tolerated    Risk Management: upright for all intake, stay upright for at least 30 mins after intake, small bites/sips, oral care 2-3x/day to reduce adverse affects in the event of aspiration, increase physical mobility as able, alternate bites/sips, slow rate of intake, general GERD precautions, and general aspiration precautions      NAME:Gris Taveras  : 1950 (74 y.o.)   MRN: [id number]  ROOM: [address] [place] [address]  ADMISSION DATE: 10/19/2023  PATIENT DIAGNOSIS(ES): NSTEMI (non-ST elevated myocardial infarction) ([address]) [I21.4]  No chief complaint on file.     Patient Active Problem List    Diagnosis Date Noted    Acute kidney injury superimposed on CKD ([address]) 2023    Ventricular tachyarrhythmia ([address]) 10/20/2023    Ventricular fibrillation ([address]) 10/20/2023    NSTEMI (non-ST elevated myocardial infarction) ([address]) 10/19/2023    Ischemic cardiomyopathy     Acute metabolic encephalopathy     Nausea 10/18/2023    Acute adrenal crisis ([address]) 10/17/2023    Hypotension 10/17/2023    Suspected chronic osteomyelitis of sacrum ([address]) 10/16/2023    Uncontrolled type 2 diabetes mellitus with hyperglycemia ([address]) 2023    Venous insufficiency of left lower extremity 2022    Nonrheumatic mitral (valve) insufficiency 10/29/2021    Mild malnutrition ([address]) 2021    Chronic systolic (congestive) heart failure (HCC)     Aortocoronary bypass status 2021    Lumbar spondylosis 2020    Pressure ulcer of coccygeal region, stage 4 ([address]) 2020    Myopia of both eyes 2020    Primary open angle glaucoma (POAG) of both eyes, mild stage 2020    Type 2 diabetes mellitus with unspecified diabetic Trial: IDDSI 0 Thin Tsp  Posture: Head Neutral  Strategy: N/A-Normal Swallow  Results: Timely swallow. No penetration or aspiration. No residue. Trial: IDDSI 0 Thin Cup  Posture: Head Neutral  Strategy: N/A-Normal Swallow  Results: Rapid spillover over epiglottic rim before the swallow. Thus, flash penetration before the swallow that self-cleared. No aspiration. No residue. Trial: IDDSI 0 Thin Straw  Posture: Head Neutral  Strategy: N/A-Normal Swallow  Results: Rapid spillover over epiglottic rim before the swallow into the laryngeal vestibule. Thus, flash penetration before the swallow that self-cleared. No aspiration. No residue. Trial: IDDSI 4 Puree/Pudding  Posture: Head Neutral  Strategy: N/A-Normal Swallow  Results: premature spillage to valleculae. No penetration or aspiration. No residue. Trial: IDDSI 6 Soft and Bite-Sized Solid   Posture: Head Neutral  Strategy: N/A-Normal Swallow  Results: premature spillage to valleculae. No penetration or aspiration. No residue. Trial: IDDSI 7 Regular Solid  Posture: Head Neutral  Strategy: N/A-Normal Swallow  Results: Slow mastication. Timely swallow with x1 bolus loss to left pyriforms. No penetration or aspiration. No residue. Backflow into pharynx noted: [] Yes  [x] No     Comments:   No bolus colored backflow noted - pt does report hx of reflux and taking medications for it. Oral Phase:  Slow and prolonged mastication, but adequate A-P transit and oral clearance. Premature bolus loss. Pharyngeal Phase:  Delayed swallow. Flash penetration before the swallow, but self-cleared. No aspiration. Summary:  Oropharyngeal phase of swallowing characterized by slow and prolonged mastication with regular solids, but adequate A-P transit and oral clearance given time. Premature spillage with rapid spillover over epiglottic rim before the swallow with thin (cup and straw).  Thus, flash penetration before the swallow that risk for aspiration PNA, and low risk for malnutrition/dehydration. Swallow prognosis is good. SLP recommends regular solids (encouraged to choose softer foods), thin liquids - straws ok, meds PO as tolerated. RN made aware of recommendations. SLP edu pt re: safe swallow strategies - pt verbalized understanding. SLP reviewed FEES and diet recs with pt and pt's family. Understanding verbalized. SLP to continue to follow for diet tolerance. Dysphagia Treatment Goals  Long Term Goals:   Timeframe for Long Term Goals: (7 days 10/27/23)  Goal 1: The patient will tolerate least restrictive diet with no clinical s/s of aspiration or worsening respiratory/pulmonary status   10/23/2023 : Goal addressed, see above. Ongoing, progressing. Short Term Goals  Time Frame for Short Term Goals: (5 days 10/25/23)  Goal 1: The patient will tolerate recommended diet with no clinical s/s of aspiration 5/5  10/23/2023 : Goal addressed, see above. Ongoing, progressing. Goal 2: The patient will recall/perform recommended compensatory strategies given min cues   10/23/2023 : Goal addressed, see above. Ongoing, progressing. Goal 3: The patient will tolerate instrumental assessment when able   Goal met 10/23/23 - FEES completed. ENDOSCOPE REMOVAL: Endoscope was removed without incident, no adverse reactions. No bleeding  PRE TEST HR: 82  PRE TEST O2: 98% on 2L   POST TEST HR:84  POST TEST O2: 97% on 2L     RECOMMENDATIONS:  Diet Recommendation: IDDSI 7 Regular Solids (encouraged to choose softer foods);  IDDSI 0 Thin Liquids;  Meds PO as tolerated  Risk Management: upright for all intake, stay upright for at least 30 mins after intake, small bites/sips, oral care 2-3x/day to reduce adverse affects in the event of aspiration, increase physical mobility as able, alternate bites/sips, slow rate of intake, general GERD precautions, and general aspiration precautions  Specialist Referrals: N/A  Ancillary Tests: N/A  Goals:

## 2023-10-24 ENCOUNTER — APPOINTMENT (OUTPATIENT)
Dept: GENERAL RADIOLOGY | Age: 73
End: 2023-10-24
Attending: INTERNAL MEDICINE
Payer: MEDICARE

## 2023-10-24 LAB
ACID FAST STN SPEC QL: NORMAL
ACID FAST STN SPEC QL: NORMAL
ALBUMIN SERPL-MCNC: 2.3 G/DL (ref 3.4–5)
ANION GAP SERPL CALCULATED.3IONS-SCNC: 5 MMOL/L (ref 3–16)
BUN SERPL-MCNC: 23 MG/DL (ref 7–20)
CALCIUM SERPL-MCNC: 8.3 MG/DL (ref 8.3–10.6)
CHLORIDE SERPL-SCNC: 98 MMOL/L (ref 99–110)
CO2 SERPL-SCNC: 31 MMOL/L (ref 21–32)
CREAT SERPL-MCNC: 1.1 MG/DL (ref 0.6–1.2)
GFR SERPLBLD CREATININE-BSD FMLA CKD-EPI: 53 ML/MIN/{1.73_M2}
GLUCOSE BLD-MCNC: 152 MG/DL (ref 70–99)
GLUCOSE BLD-MCNC: 153 MG/DL (ref 70–99)
GLUCOSE BLD-MCNC: 230 MG/DL (ref 70–99)
GLUCOSE BLD-MCNC: 305 MG/DL (ref 70–99)
GLUCOSE BLD-MCNC: 97 MG/DL (ref 70–99)
GLUCOSE SERPL-MCNC: 117 MG/DL (ref 70–99)
IRON SATN MFR SERPL: 22 % (ref 15–50)
IRON SERPL-MCNC: 49 UG/DL (ref 37–145)
MAGNESIUM SERPL-MCNC: 1.8 MG/DL (ref 1.8–2.4)
MRSA DNA SPEC QL NAA+PROBE: ABNORMAL
MYCOBACTERIUM SPEC CULT: NORMAL
MYCOBACTERIUM SPEC CULT: NORMAL
ORGANISM: ABNORMAL
PERFORMED ON: ABNORMAL
PERFORMED ON: NORMAL
PHOSPHATE SERPL-MCNC: 1.9 MG/DL (ref 2.5–4.9)
POTASSIUM SERPL-SCNC: 3.8 MMOL/L (ref 3.5–5.1)
SODIUM SERPL-SCNC: 134 MMOL/L (ref 136–145)
TIBC SERPL-MCNC: 221 UG/DL (ref 260–445)

## 2023-10-24 PROCEDURE — 94640 AIRWAY INHALATION TREATMENT: CPT

## 2023-10-24 PROCEDURE — 2580000003 HC RX 258: Performed by: INTERNAL MEDICINE

## 2023-10-24 PROCEDURE — 6370000000 HC RX 637 (ALT 250 FOR IP): Performed by: NURSE PRACTITIONER

## 2023-10-24 PROCEDURE — 6370000000 HC RX 637 (ALT 250 FOR IP): Performed by: INTERNAL MEDICINE

## 2023-10-24 PROCEDURE — 6360000002 HC RX W HCPCS: Performed by: INTERNAL MEDICINE

## 2023-10-24 PROCEDURE — 80069 RENAL FUNCTION PANEL: CPT

## 2023-10-24 PROCEDURE — 99233 SBSQ HOSP IP/OBS HIGH 50: CPT | Performed by: INTERNAL MEDICINE

## 2023-10-24 PROCEDURE — 87641 MR-STAPH DNA AMP PROBE: CPT

## 2023-10-24 PROCEDURE — 2060000000 HC ICU INTERMEDIATE R&B

## 2023-10-24 PROCEDURE — 83735 ASSAY OF MAGNESIUM: CPT

## 2023-10-24 PROCEDURE — 97530 THERAPEUTIC ACTIVITIES: CPT

## 2023-10-24 PROCEDURE — 99232 SBSQ HOSP IP/OBS MODERATE 35: CPT | Performed by: NURSE PRACTITIONER

## 2023-10-24 PROCEDURE — 97116 GAIT TRAINING THERAPY: CPT

## 2023-10-24 PROCEDURE — 2700000000 HC OXYGEN THERAPY PER DAY

## 2023-10-24 PROCEDURE — 71045 X-RAY EXAM CHEST 1 VIEW: CPT

## 2023-10-24 PROCEDURE — 94761 N-INVAS EAR/PLS OXIMETRY MLT: CPT

## 2023-10-24 RX ORDER — METOPROLOL SUCCINATE 25 MG/1
25 TABLET, EXTENDED RELEASE ORAL DAILY
Status: DISCONTINUED | OUTPATIENT
Start: 2023-10-25 | End: 2023-10-28 | Stop reason: HOSPADM

## 2023-10-24 RX ORDER — TORSEMIDE 20 MG/1
20 TABLET ORAL DAILY
Status: DISCONTINUED | OUTPATIENT
Start: 2023-10-25 | End: 2023-10-27

## 2023-10-24 RX ORDER — PREDNISONE 1 MG/1
4 TABLET ORAL DAILY
Status: DISCONTINUED | OUTPATIENT
Start: 2023-10-25 | End: 2023-10-28 | Stop reason: HOSPADM

## 2023-10-24 RX ADMIN — Medication: at 20:50

## 2023-10-24 RX ADMIN — INSULIN LISPRO 12 UNITS: 100 INJECTION, SOLUTION INTRAVENOUS; SUBCUTANEOUS at 16:41

## 2023-10-24 RX ADMIN — BUDESONIDE 500 MCG: 0.5 SUSPENSION RESPIRATORY (INHALATION) at 20:16

## 2023-10-24 RX ADMIN — BUDESONIDE 500 MCG: 0.5 SUSPENSION RESPIRATORY (INHALATION) at 08:19

## 2023-10-24 RX ADMIN — GABAPENTIN 300 MG: 300 CAPSULE ORAL at 08:12

## 2023-10-24 RX ADMIN — DULOXETINE HYDROCHLORIDE 60 MG: 60 CAPSULE, DELAYED RELEASE ORAL at 20:49

## 2023-10-24 RX ADMIN — SACUBITRIL AND VALSARTAN 0.5 TABLET: 24; 26 TABLET, FILM COATED ORAL at 20:49

## 2023-10-24 RX ADMIN — METOPROLOL SUCCINATE 12.5 MG: 25 TABLET, EXTENDED RELEASE ORAL at 08:14

## 2023-10-24 RX ADMIN — OXYCODONE HYDROCHLORIDE 5 MG: 5 TABLET ORAL at 14:20

## 2023-10-24 RX ADMIN — INSULIN LISPRO 4 UNITS: 100 INJECTION, SOLUTION INTRAVENOUS; SUBCUTANEOUS at 11:48

## 2023-10-24 RX ADMIN — DULOXETINE HYDROCHLORIDE 60 MG: 60 CAPSULE, DELAYED RELEASE ORAL at 08:14

## 2023-10-24 RX ADMIN — GUAIFENESIN 600 MG: 600 TABLET ORAL at 20:49

## 2023-10-24 RX ADMIN — ENOXAPARIN SODIUM 40 MG: 100 INJECTION SUBCUTANEOUS at 08:16

## 2023-10-24 RX ADMIN — ASPIRIN 81 MG: 81 TABLET, CHEWABLE ORAL at 08:12

## 2023-10-24 RX ADMIN — Medication 10 ML: at 20:51

## 2023-10-24 RX ADMIN — INSULIN GLARGINE 15 UNITS: 100 INJECTION, SOLUTION SUBCUTANEOUS at 20:50

## 2023-10-24 RX ADMIN — TIOTROPIUM BROMIDE AND OLODATEROL 2 PUFF: 3.124; 2.736 SPRAY, METERED RESPIRATORY (INHALATION) at 08:19

## 2023-10-24 RX ADMIN — CETIRIZINE HYDROCHLORIDE 10 MG: 10 TABLET, FILM COATED ORAL at 08:14

## 2023-10-24 RX ADMIN — GUAIFENESIN 600 MG: 600 TABLET ORAL at 08:13

## 2023-10-24 RX ADMIN — Medication 10 ML: at 08:06

## 2023-10-24 RX ADMIN — CLOPIDOGREL BISULFATE 75 MG: 75 TABLET ORAL at 08:14

## 2023-10-24 RX ADMIN — ATORVASTATIN CALCIUM 40 MG: 40 TABLET, FILM COATED ORAL at 08:12

## 2023-10-24 RX ADMIN — MORPHINE SULFATE 15 MG: 15 TABLET, FILM COATED, EXTENDED RELEASE ORAL at 08:13

## 2023-10-24 RX ADMIN — MORPHINE SULFATE 15 MG: 15 TABLET, FILM COATED, EXTENDED RELEASE ORAL at 20:49

## 2023-10-24 RX ADMIN — PANTOPRAZOLE SODIUM 40 MG: 40 TABLET, DELAYED RELEASE ORAL at 05:56

## 2023-10-24 RX ADMIN — ROFLUMILAST 500 MCG: 500 TABLET ORAL at 08:20

## 2023-10-24 RX ADMIN — GABAPENTIN 300 MG: 300 CAPSULE ORAL at 20:49

## 2023-10-24 RX ADMIN — INSULIN GLARGINE 15 UNITS: 100 INJECTION, SOLUTION SUBCUTANEOUS at 08:06

## 2023-10-24 RX ADMIN — HYDROCORTISONE SODIUM SUCCINATE 50 MG: 100 INJECTION, POWDER, FOR SOLUTION INTRAMUSCULAR; INTRAVENOUS at 08:10

## 2023-10-24 ASSESSMENT — PAIN SCALES - WONG BAKER
WONGBAKER_NUMERICALRESPONSE: 0

## 2023-10-24 ASSESSMENT — PAIN DESCRIPTION - LOCATION
LOCATION: BACK

## 2023-10-24 ASSESSMENT — PAIN DESCRIPTION - ORIENTATION
ORIENTATION: LOWER
ORIENTATION: LOWER
ORIENTATION: MID

## 2023-10-24 ASSESSMENT — PAIN DESCRIPTION - DESCRIPTORS
DESCRIPTORS: STABBING
DESCRIPTORS: ACHING
DESCRIPTORS: DISCOMFORT
DESCRIPTORS: SHARP
DESCRIPTORS: ACHING

## 2023-10-24 ASSESSMENT — PAIN SCALES - GENERAL
PAINLEVEL_OUTOF10: 6
PAINLEVEL_OUTOF10: 6
PAINLEVEL_OUTOF10: 3
PAINLEVEL_OUTOF10: 7
PAINLEVEL_OUTOF10: 0
PAINLEVEL_OUTOF10: 8
PAINLEVEL_OUTOF10: 8

## 2023-10-24 ASSESSMENT — PAIN DESCRIPTION - ONSET: ONSET: ON-GOING

## 2023-10-24 ASSESSMENT — PAIN - FUNCTIONAL ASSESSMENT: PAIN_FUNCTIONAL_ASSESSMENT: ACTIVITIES ARE NOT PREVENTED

## 2023-10-24 ASSESSMENT — PAIN DESCRIPTION - PAIN TYPE: TYPE: CHRONIC PAIN

## 2023-10-24 ASSESSMENT — PAIN DESCRIPTION - FREQUENCY: FREQUENCY: CONTINUOUS

## 2023-10-24 NOTE — PLAN OF CARE
Problem: Discharge Planning  Goal: Discharge to home or other facility with appropriate resources  10/23/2023 2359 by Bere Gonzalez, RN  Outcome: Progressing  Flowsheets (Taken 10/23/2023 2000)  Discharge to home or other facility with appropriate resources:   Identify barriers to discharge with patient and caregiver   Arrange for needed discharge resources and transportation as appropriate   Identify discharge learning needs (meds, wound care, etc)     Problem: Safety - Adult  Goal: Free from fall injury  10/23/2023 2359 by Bere Gonzalez RN  Outcome: Completed  Flowsheets (Taken 10/20/2023 2252 by Winslow Baumgarten, RN)  Free From Fall Injury: Instruct family/caregiver on patient safety

## 2023-10-24 NOTE — PLAN OF CARE
Problem: Respiratory - Adult  Goal: Achieves optimal ventilation and oxygenation  Outcome: Progressing  Flowsheets (Taken 10/23/2023 2000 by Lanre Starr RN)  Achieves optimal ventilation and oxygenation:   Assess for changes in respiratory status   Assess for changes in mentation and behavior   Position to facilitate oxygenation and minimize respiratory effort   Oxygen supplementation based on oxygen saturation or arterial blood gases   Initiate smoking cessation protocol as indicated   Encourage broncho-pulmonary hygiene including cough, deep breathe, incentive spirometry   Assess the need for suctioning and aspirate as needed   Assess and instruct to report shortness of breath or any respiratory difficulty   Respiratory therapy support as indicated

## 2023-10-24 NOTE — PROGRESS NOTES
V2.0    [facility] Progress Note      Name:  Lay Juarez /Age/Sex: 1950  (68 y.o. female)   MRN & CSN:  [id number] & [id number] Encounter Date/Time: 10/24/2023 12:00 PM EDT   Location:  [id number] PCP: Treasa Chen, MD Craven Sandifer, MD       Hospital Day: 6    Assessment and Recommendations   Lay Juarez is a 68 y.o. female with pmh of hypertension, CAD (CABG), BIMAL clip, cardiomyopathy (EF has improved from 30% to 50%), COPD/bronchiectasis, tracheomalacia, obstructive sleep apnea, diabetes type 2, rheumatoid arthritis (on prednisone 4 mg daily), CKD 3 and chronic nonhealing stage IV pressure ulcer admitted and [facility] on 10/1640 follows:  1. Nonhealing stage IV pressure ulcer-MRI consistent with osteomyelitis. ID consulted-continue wound care, no need for antibiotics for chronic wound. 2.  Hypotension: Status post pressors. This is due to adrenal crisis. Currently on IV hydrocortisone. 3.  Bronchiectasis: Critical care/pulmonology did bronchoscopy. BAL 10/13 grew Serratia. Started on Levaquin. 4.  Elevated troponin: Started on heparin drip and transferred here for cardiology evaluation. However, patient went into V-fib cardiac arrest when patient arrived here on 10/18 requiring CPR only. ROSC attained less than 2 minutes. 1.  V-fib cardiac arrest: Status post amiodarone drip. Cardiology consult appreciated. Possible non-ST elevation MI.  CT scan of the head-no acute infarct. 2.  Non-ST elevation MI: s/p  heparin drip. Status post nitroglycerin drip. Cardiac catheterization on 10/20-occluded SVG to OM. Medical management recommended. On metoprolol, aspirin, Plavix and Lipitor. 3.  Cardiomyopathy: Echo 10/19-EF 30% with regional wall motion abnormality. On IV Lasix 20 mg twice daily. Hold off Entresto for now because of recent hypotension. Continue metoprolol. LifeVest before discharge.   Po lasix tomorrow , hold off on implanted ICD at intracranial hemorrhage, mass effect or midline shift. No abnormal extra-axial fluid collection. The gray-white differentiation is maintained without evidence of an acute infarct. There is no evidence of hydrocephalus. ORBITS: The visualized portion of the orbits demonstrate no acute abnormality. SINUSES: The visualized paranasal sinuses and mastoid air cells demonstrate no acute abnormality. SOFT TISSUES/SKULL:  No acute abnormality of the visualized skull or soft tissues. No acute intracranial abnormality. MRI SACRUM COCCYX WO CONTRAST    Result Date: 10/16/2023  EXAMINATION: MRI OF THE SACRUM/SI JOINT WITHOUT CONTRAST, 10/16/2023 4:28 pm TECHNIQUE: Multiplanar multisequence MRI of the sacrum/si joint was performed without the administration of intravenous contrast. COMPARISON: Sacrum/coccyx plain radiographs from 10/10/2023 HISTORY: ORDERING SYSTEM PROVIDED HISTORY: Pressure ulcer of coccygeal region, stage 4 ([address]) TECHNOLOGIST PROVIDED HISTORY: Reason for exam:->stage 4 sacral pressure ulcer, increased pain and drainage, suspect osteomyelitis, XR negative 71-year-old female with pressure ulcer of the coccygeal region, stage IV; suspect osteomyelitis FINDINGS: SOFT TISSUES: Ulceration and gas containing sinus tract superficial to the lower coccyx on sagittal image 21, series 5, and image 21, series 4. Moderate edema and fluid in the subcutaneous fat along the midline posterior back. Severe atrophy and fatty degeneration of the paraspinal musculature. BONE MARROW: Subjacent to the ulceration and gas containing sinus tract, there is mild subjacent hyperintense STIR signal and low T1 signal on image 19, series 4, and series 5, within the mid coccyx also seen on image 14, series 9, and series 8, concerning for early osteomyelitis. Visualized sacral foramen are grossly unremarkable. Probable remote compression deformity of L5. Mild to moderate degenerative changes in the lower lumbar/lumbosacral spine. No significant marrow edema in the sacral ala or iliac bones. JOINTS: Bilateral SI joints appear patent. No SI joint effusion. No SI joint erosive changes. 1. Ulceration and gas containing sinus tract superficial to the lower coccyx with subjacent suspected early osteomyelitis of the mid coccyx. 2. Moderate phlegmonous changes/cellulitis along the posterior midline lower back. 3. Probable remote compression deformity of L5. Mild-to-moderate degenerative changes in the lower lumbar/lumbosacral spine. 4. No MR evidence for sacroiliitis. SI joints appear patent. The findings were sent to the Radiology Results [facility] at 7:50 pm on 10/16/2023 to be communicated to a licensed caregiver. CBC:   Recent Labs     10/22/23  0550   WBC 5.8   HGB 8.3*          BMP:    Recent Labs     10/22/23  0550 10/23/23  0517 10/24/23  0330    138 134*   K 3.7 3.5 3.8    100 98*   CO2 30 32 31   BUN 22* 22* 23*   CREATININE 1.4* 1.1 1.1   GLUCOSE 83 98 117*       Hepatic:   Recent Labs     10/22/23  0550   AST 18   ALT 12   BILITOT 0.3   ALKPHOS 53       Lipids:   Lab Results   Component Value Date/Time    CHOL 112 10/29/2021 08:23 AM    HDL 34 10/29/2021 08:23 AM    HDL 43 07/22/2010 09:28 AM    TRIG 85 10/29/2021 08:23 AM     Hemoglobin A1C:   Lab Results   Component Value Date/Time    LABA1C 7.8 08/05/2023 02:22 PM     TSH: No results found for: \"TSH\"  Troponin: No results found for: \"TROPONINT\"  Lactic Acid:   No results for input(s): \"LACTA\" in the last 72 hours. BNP:   No results for input(s): \"PROBNP\" in the last 72 hours.     UA:  Lab Results   Component Value Date/Time    NITRU Negative 10/16/2023 09:15 PM    COLORU Yellow 10/16/2023 09:15 PM    PHUR 6.0 10/16/2023 09:15 PM    WBCUA 10-20 10/16/2023 09:15 PM    RBCUA 3-4 10/16/2023 09:15 PM    MUCUS Rare 08/01/2022 12:00 PM    BACTERIA 1+ 10/16/2023 09:15 PM    CLARITYU Clear 10/16/2023 09:15 PM    SPECGRAV 1.010 10/16/2023 09:15 PM LEUKOCYTESUR Negative 10/16/2023 09:15 PM    UROBILINOGEN 0.2 10/16/2023 09:15 PM    BILIRUBINUR Negative 10/16/2023 09:15 PM    BILIRUBINUR NEGATIVE 03/20/2010 12:52 PM    BLOODU TRACE-INTACT 10/16/2023 09:15 PM    GLUCOSEU Negative 10/16/2023 09:15 PM    GLUCOSEU NEGATIVE 03/20/2010 12:52 PM    KETUA Negative 10/16/2023 09:15 PM     Urine Cultures:   Lab Results   Component Value Date/Time    LABURIN No growth at 18 to 36 hours 10/16/2023 09:15 PM     Blood Cultures:   Lab Results   Component Value Date/Time    BC No Growth after 4 days of incubation. 10/16/2023 07:15 PM     Lab Results   Component Value Date/Time    BLOODCULT2 No Growth after 4 days of incubation.  10/16/2023 07:36 PM     Organism:   Lab Results   Component Value Date/Time    ORG Serratia marcescens 10/13/2023 11:45 AM    ORG Zoe dubliniensis 10/13/2023 11:45 AM    ORG Serratia marcescens 10/13/2023 11:45 AM    ORG Candida albicans 10/13/2023 11:45 AM         Electronically signed by Ambika Salas MD on 10/24/2023 at 8:56 AM

## 2023-10-24 NOTE — PLAN OF CARE
CHF Care Plan      Patient's EF (Ejection Fraction) is less than 40%    Heart Failure Medications:  Diuretics[de-identified] Furosemide    (One of the following REQUIRED for EF </= 40%/SYSTOLIC FAILURE but MAY be used in EF% >40%/DIASTOLIC FAILURE)        ACE[de-identified] None        ARB[de-identified] None         ARNI[de-identified] Sacubitril/Valsartan-Entresto    (Beta Blockers)  NON- Evidenced Based Beta Blocker (for EF% >40%/DIASTOLIC FAILURE): None    Evidenced Based Beta Blocker::(REQUIRED for EF% <40%/SYSTOLIC FAILURE) Metoprolol SUCCinate- Toprol XL  . .................................................................................................................................................. Healthy Weight Tracking - BMI + Meds 10/19/2023 10/20/2023 10/21/2023 10/22/2023 10/22/2023 10/23/2023 10/24/2023   Weight 188 lb 7.9 oz 191 lb 12.8 oz 189 lb 2.5 oz 179 lb 10.8 oz 189 lb 2.5 oz 189 lb 2.5 oz 189 lb 6 oz   Height 5' 7.008\" - - - - - -   Body Mass Index - 30.03 kg/m2 29.62 kg/m2 28.13 kg/m2 29.62 kg/m2 29.62 kg/m2 29.65 kg/m2   Some recent data might be hidden         Patient's weights and intake/output reviewed: Yes    Daily Weight log at bedside, patient/family participation in use of log: \"yes    Patient's Last Weight: 85.8kg obtained by bed scale.      Intake/Output Summary (Last 24 hours) at 10/24/2023 0636  Last data filed at 10/23/2023 2224  Gross per 24 hour   Intake 960 ml   Output 950 ml   Net 10 ml         Education Booklet Provided: yes    Comorbidities Reviewed Yes    Patient has a past medical history of Acute on chronic diastolic heart failure due to coronary artery disease ([address]), Acute respiratory failure with hypoxia ([address]), Atherosclerosis of native artery of right lower extremity with rest pain ([address]), Back pain, Branch retinal vein occlusion, Bronchiectasis with acute exacerbation ([address]), Candidal dermatitis, Cellulitis and abscess of left leg, Cellulitis of left lower extremity, CHF (congestive heart failure) ([address]), Closed

## 2023-10-24 NOTE — PROGRESS NOTES
Physical Therapy  Facility/Department: [facility] C2 CARD TELEMETRY  Daily Treatment Note  NAME: Dar Yost  : 1950  MRN: [id number]    Date of Service: 10/24/2023    Discharge Recommendations:  24 hour supervision or assist, Home with Home health PT   PT Equipment Recommendations  Equipment Needed: No    Patient Diagnosis(es): The encounter diagnosis was Ventricular tachyarrhythmia ([address]). Assessment   Pt sitting up in  chair upon arrival with family at bedside. Pt agreeable to PT tx, reports typically chronic back pain. Pt tolerates increased gait distance this date and min(A) progressing to [facility] for transfers. Pt functioning below baseline and would benefit from skilled therapy to address current deficits mentioned above. Pt plans to d/c home with support from family  and  [address]. Activity Tolerance: Patient tolerated treatment well  Equipment Needed: No     Plan    Physcial Therapy Plan  General Plan: 5-7 times per week  Current Treatment Recommendations: Strengthening;ROM;Balance training;Gait training;Home exercise program;Functional mobility training;Stair training; Therapeutic activities; Safety education & training;Transfer training;Patient/Caregiver education & training; Endurance training;Pain management     Restrictions  Restrictions/Precautions  Restrictions/Precautions: Fall Risk, General Precautions  Required Braces or Orthoses?: No  Position Activity Restriction  Other position/activity restrictions: ICU monitoring     Subjective    Subjective  Subjective: Pt sitting up in chair upon arrival, family at bedside.   Pain: Pt stated \"just my normal back pain\" did not rate  Orientation  Overall Orientation Status: Within Normal Limits  Orientation Level: Oriented X4  Cognition  Overall Cognitive Status: WFL     Objective   Vitals  /60, HR 85 bpm, SpO2 95% on 1 L    Bed Mobility Training  Bed Mobility Training: No  Balance  Sitting: With support  Standing: Impaired  Standing - Static: Constant

## 2023-10-24 NOTE — PROGRESS NOTES
PULMONARY AND CRITICAL CARE INPATIENT NOTE        Braden Jain   : 1950  MRN: [id number]     Admitting Physician: Luis Carlos Llanos MD  Attending Physician: Lexx Hurtado MD  PCP: John Staples MD    Admission: 10/19/2023   Date of Service: 10/24/2023    No chief complaint on file. ASSESSMENT & PLAN       68 y.o. pleasant  female patient with:    Hospital Problems             Last Modified POA    * (Principal) NSTEMI (non-ST elevated myocardial infarction) ([address]) 10/19/2023 Yes    Acute kidney injury superimposed on CKD ([address]) 10/19/2023 Yes    Rheumatoid arthritis ([address]) (Chronic) 10/19/2023 Yes    Coronary artery disease (Chronic) 10/19/2023 Yes    Type 2 diabetes mellitus with diabetic peripheral angiopathy without gangrene, with long-term current use of insulin ([address]) 10/19/2023 Yes    Pressure ulcer of coccygeal region, stage 4 ([address]) 10/19/2023 Yes    Aortocoronary bypass status 10/19/2023 Yes    Overview Signed 2021  4:31 PM by Uyen Smith MD     2021         Acute on chronic HFrEF (heart failure with reduced ejection fraction) ([address]) 10/23/2023 Yes    Acute adrenal crisis ([address]) 10/19/2023 Yes    Acute metabolic encephalopathy  Yes    Ischemic cardiomyopathy 10/19/2023 Yes    Ventricular tachyarrhythmia ([address]) 10/20/2023 Yes    Ventricular fibrillation ([address]) 10/20/2023 Yes           NEURO/PSYCH:  #Acute encephalopathy, toxic metabolic. Negative head CT scan on . Improved  #Depression  #Pain issues  #History of strokes  Delirium precautions. CARDIOVASCULAR:  #Acute Non-STEMI  #In-hospital VT cardiac arrest. 2 min. Status post left heart cath on 2023. #CAD s/p CABG in May 2021  #CHF  Aspirin, Lipitor, Plavix, beta-blocker, Entresto per cardiology  LifeVest per cardiology but insurance is a barrier. Might need PCI while inpt per cardiology    PULMONARY:  #New right lung infiltrate/aspiration pneumonia with a pleural effusion.   Serratia on BAL October 13. Improved on follow-up chest x-ray  #COPD, mild to mod on PFTs  #Bronchiectasis/pulmonary MAC  #DANIELLE  #Tracheobronchomalacia  #Tobacco abuse  HOB 30 degree/Aspiration precautions. Mouth care per ICU protocol. Stiolto, Pulmicort with DuoNebs as needed. Resume Trelegy at discharge  Mucinex   7 days of levofloxacin completed on October 23    ID:  #See pulmonary section    RENAL:  Electrolytes replacement as needed per ICU protocols. Avoid nephrotoxic agents. Renal dosing per pharmacy for meds. GI/Hepatology:  Diet: ADULT DIET; Regular; 5 carb choices (75 gm/meal)  ADULT ORAL NUTRITION SUPPLEMENT; Breakfast, Dinner, Lunch; Diabetic Oral Supplement    HEM/ONC:  #Diabetes  Target Hb >7, Platelets>50 unless specified otherwise. ENDO:  #Hyperglycemia  #Adrenal insufficiency  Insulin adjustment to keep blood sugar target 140-180mg/dL unless specified otherwise. Resume home dose of prednisone 4 mg p.o. 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 No plan for debridement currently  [place] prevention/management measures. PT/OT when patient is able to participate      ICU Checklist:  GI Ppx: Pantoprazole  DVT Ppx: Lovenox    Lines/Tubes/Catheters:  PIV's  Please alert provider about unnecessary catheters or tubes or with signs of infection to discontinue. LOS: Hospital Day: 6    Code:Full Code          Subjective/Objective             Summary:   71-year-old lady who presented initially to [facility] ER on October 16 with altered mental status and transferred to [facility] on October 19 for higher level of care. Patient has nonhealing stage IV decubitus ulcer with coccygeal osteomyelitis, adrenal insufficiency, non-STEMI leading to V-fib cardiac arrest transferred to the ICU. Interval history/Encounter 10/24/2023   Patient remained relatively stable with no major issues. No fever recorded. No need for vasopressors. acetate <=0.12 mcg/mL Sensitive      Fluconazole 2 mcg/mL Sensitive      Voriconazole <=0.12 mcg/mL Sensitive                           Culture with Smear, Acid Fast Bacillius [[id number]] Collected: 10/13/23 1145    Order Status: Completed Specimen: BAL- Bronch. Lavage Updated: 10/24/23 0434     AFB Culture (Mycobacteria) No growth after 1 week/s of incubation.      AFB Smear No AFB observed by Fluorescent stain    Narrative:      ORDER#: [id number]                          ORDERED BY: SAPPHIRE SANTOS  SOURCE: Bronchial Alveolar Lavage Right MidCOLLECTED:  10/13/23 11:45  ANTIBIOTICS AT JORGE.:                      RECEIVED :  10/14/23 01:34    Culture, Respiratory [[id number]]  (Abnormal) Collected: 10/13/23 1145    Order Status: Completed Specimen: Bronchial Washing Updated: 10/15/23 0649     CULTURE, RESPIRATORY Heavy growth normal respiratory aniya with     Gram Stain Result 3+ WBC's  2+ Epithelial Cells  2+ Gram positive cocci  1+ Gram positive rods  1+ Yeast       Organism Serratia marcescens     CULTURE, RESPIRATORY --     Moderate growth  No further workup  Refer to culture [id number] for sensitivity results      Narrative:      ORDER#: [id number]                          ORDERED BY: SAPPHIRE SANTOS  SOURCE: Bronchial Washing                  COLLECTED:  10/13/23 11:45  ANTIBIOTICS AT JORGE.:                      RECEIVED :  10/14/23 10:12    Culture, Fungus [[id number]]  (Abnormal)  (Susceptibility) Collected: 10/13/23 1145    Order Status: Completed Specimen: Bronchial Washing Updated: 10/19/23 1440     Fungus Stain 2+ Budding yeast     Organism Candida albicans     Fungus (Mycology) Culture Light growth    Narrative:      ORDER#: [id number]                          ORDERED BY: SAPPHIRE SANTOS  SOURCE: Bronchial Washing                  COLLECTED:  10/13/23 11:45  ANTIBIOTICS AT JORGE.:                      RECEIVED :  10/14/23 01:39    Susceptibility        Zoe albicans      BACTERIAL SUSCEPTIBILITY PANEL BY LINDA

## 2023-10-24 NOTE — CONSULTS
Nutrition Note    RD received consult for HF diet education. Pt not appropriate for diet education at this time d/t recent diet advancement, increased nutrient needs r/t wounds, and varying po intakes per EMR. Dietitians following with nutrition assessment and recommendations in RD progress notes, see last note from 10/23 for further details. Will provide diet education as appropriate. Will continue to monitor per nutrition standards of care.      Electronically signed by Gretta Brown RD on 10/24/23 at 10:39 AM EDT    Contact: Office: [phone]; [address] [place] [address] Road: [phone]

## 2023-10-24 NOTE — CARE COORDINATION
Chart reviewed day 5 - Pt had angiogram. EP consulted cardio about possible CABG. Cardio rec is [employer]oll life vest which pt's insurance does not approve based on  criteria and EF 40% . Plan is to go  home with 24/7 supervision and [facility] [place].

## 2023-10-24 NOTE — PLAN OF CARE
Patient's EF (Ejection Fraction) is less than 40%    Heart Failure Medications:  Diuretics[de-identified] Other Demadex to start 10/25    (One of the following REQUIRED for EF <40%/SYSTOLIC FAILURE but MAY be used in EF% >40%/DIASTOLIC FAILURE)        ACE[de-identified] None        ARB[de-identified] None         ARNI: Entresto to resume 10/25    (Beta Blockers)  NON- Evidenced Based Beta Blocker (for EF% >40%/DIASTOLIC FAILURE): None    Evidenced Based Beta Blocker::(REQUIRED for EF% <40%/SYSTOLIC FAILURE) Toprol XL to start 10/25  . .................................................................................................................................................. Patient's Last Weight: 85.9 kg obtained by standing scale. Difference in weight is 0.1 kg more than last documented weight.     Intake/Output Summary (Last 24 hours) at 10/24/2023 1831  Last data filed at 10/24/2023 1631  Gross per 24 hour   Intake 240 ml   Output 800 ml   Net -560 ml       CHF Education booklet provided: yes    Comorbidities Reviewed Yes  Patient has a past medical history of Acute on chronic diastolic heart failure due to coronary artery disease ([address]), Acute respiratory failure with hypoxia ([address]), Atherosclerosis of native artery of right lower extremity with rest pain ([address]), Back pain, Branch retinal vein occlusion, Bronchiectasis with acute exacerbation ([address]), Candidal dermatitis, Cellulitis and abscess of left leg, Cellulitis of left lower extremity, CHF (congestive heart failure) (HCC), Closed compression fracture of thoracic vertebra ([address]), Closed fracture of facial bone with routine healing, Closed jaw fracture ([address]), Community acquired pneumonia of left lower lobe of lung, Compression fracture of L1 lumbar vertebra (HCC), COPD (chronic obstructive pulmonary disease) ([address]), COPD exacerbation ([address]), COVID, Diabetes mellitus ([address]), Fracture of tibial plateau, closed, left, initial encounter, HCAP (healthcare-associated pneumonia), Minimally displaced zone

## 2023-10-24 NOTE — PLAN OF CARE
Ms. Ayo Ibarra was treated for presumed NSTEMI on initial presentation. However, following coronary angiogram that did not show any clear culprit for acute MI, this was likely a type II event rather than type I and IV heparin was stopped right after coronary angiogram.  She had not episode of sustained VT/VF that does not have a clear etiology at this time. It is my opinion that she will benefit from a LifeVest on discharge.     Malik Trimble MD, [facility]  Interventional Cardiology  [facility]  [phone] (c)

## 2023-10-25 LAB
ALBUMIN SERPL-MCNC: 2.2 G/DL (ref 3.4–5)
ANION GAP SERPL CALCULATED.3IONS-SCNC: 8 MMOL/L (ref 3–16)
BUN SERPL-MCNC: 19 MG/DL (ref 7–20)
CALCIUM SERPL-MCNC: 7.5 MG/DL (ref 8.3–10.6)
CHLORIDE SERPL-SCNC: 103 MMOL/L (ref 99–110)
CO2 SERPL-SCNC: 26 MMOL/L (ref 21–32)
CREAT SERPL-MCNC: 0.9 MG/DL (ref 0.6–1.2)
GFR SERPLBLD CREATININE-BSD FMLA CKD-EPI: >60 ML/MIN/{1.73_M2}
GLUCOSE BLD-MCNC: 158 MG/DL (ref 70–99)
GLUCOSE BLD-MCNC: 192 MG/DL (ref 70–99)
GLUCOSE BLD-MCNC: 231 MG/DL (ref 70–99)
GLUCOSE BLD-MCNC: 337 MG/DL (ref 70–99)
GLUCOSE BLD-MCNC: 70 MG/DL (ref 70–99)
GLUCOSE BLD-MCNC: 87 MG/DL (ref 70–99)
GLUCOSE SERPL-MCNC: 92 MG/DL (ref 70–99)
MAGNESIUM SERPL-MCNC: 1.5 MG/DL (ref 1.8–2.4)
PERFORMED ON: ABNORMAL
PERFORMED ON: NORMAL
PERFORMED ON: NORMAL
PHOSPHATE SERPL-MCNC: 1.5 MG/DL (ref 2.5–4.9)
POTASSIUM SERPL-SCNC: 3 MMOL/L (ref 3.5–5.1)
SODIUM SERPL-SCNC: 137 MMOL/L (ref 136–145)

## 2023-10-25 PROCEDURE — 2580000003 HC RX 258: Performed by: INTERNAL MEDICINE

## 2023-10-25 PROCEDURE — 6370000000 HC RX 637 (ALT 250 FOR IP): Performed by: INTERNAL MEDICINE

## 2023-10-25 PROCEDURE — 80069 RENAL FUNCTION PANEL: CPT

## 2023-10-25 PROCEDURE — 6370000000 HC RX 637 (ALT 250 FOR IP): Performed by: NURSE PRACTITIONER

## 2023-10-25 PROCEDURE — 6360000002 HC RX W HCPCS: Performed by: INTERNAL MEDICINE

## 2023-10-25 PROCEDURE — 99232 SBSQ HOSP IP/OBS MODERATE 35: CPT | Performed by: NURSE PRACTITIONER

## 2023-10-25 PROCEDURE — 2060000000 HC ICU INTERMEDIATE R&B

## 2023-10-25 PROCEDURE — 94761 N-INVAS EAR/PLS OXIMETRY MLT: CPT

## 2023-10-25 PROCEDURE — 83735 ASSAY OF MAGNESIUM: CPT

## 2023-10-25 PROCEDURE — 2700000000 HC OXYGEN THERAPY PER DAY

## 2023-10-25 PROCEDURE — 97530 THERAPEUTIC ACTIVITIES: CPT

## 2023-10-25 PROCEDURE — 99232 SBSQ HOSP IP/OBS MODERATE 35: CPT | Performed by: INTERNAL MEDICINE

## 2023-10-25 PROCEDURE — 97535 SELF CARE MNGMENT TRAINING: CPT

## 2023-10-25 PROCEDURE — 94640 AIRWAY INHALATION TREATMENT: CPT

## 2023-10-25 PROCEDURE — 94669 MECHANICAL CHEST WALL OSCILL: CPT

## 2023-10-25 PROCEDURE — 97110 THERAPEUTIC EXERCISES: CPT

## 2023-10-25 RX ORDER — POTASSIUM CHLORIDE 20 MEQ/1
40 TABLET, EXTENDED RELEASE ORAL ONCE
Status: COMPLETED | OUTPATIENT
Start: 2023-10-25 | End: 2023-10-25

## 2023-10-25 RX ORDER — POTASSIUM CHLORIDE 20 MEQ/1
20 TABLET, EXTENDED RELEASE ORAL DAILY
Status: DISCONTINUED | OUTPATIENT
Start: 2023-10-25 | End: 2023-10-28 | Stop reason: HOSPADM

## 2023-10-25 RX ORDER — MAGNESIUM SULFATE IN WATER 40 MG/ML
2000 INJECTION, SOLUTION INTRAVENOUS ONCE
Status: COMPLETED | OUTPATIENT
Start: 2023-10-25 | End: 2023-10-25

## 2023-10-25 RX ADMIN — GABAPENTIN 300 MG: 300 CAPSULE ORAL at 08:47

## 2023-10-25 RX ADMIN — MORPHINE SULFATE 15 MG: 15 TABLET, FILM COATED, EXTENDED RELEASE ORAL at 20:27

## 2023-10-25 RX ADMIN — CETIRIZINE HYDROCHLORIDE 10 MG: 10 TABLET, FILM COATED ORAL at 08:49

## 2023-10-25 RX ADMIN — Medication 10 ML: at 09:40

## 2023-10-25 RX ADMIN — GUAIFENESIN 600 MG: 600 TABLET ORAL at 20:27

## 2023-10-25 RX ADMIN — ACETAMINOPHEN 650 MG: 325 TABLET ORAL at 11:59

## 2023-10-25 RX ADMIN — POTASSIUM CHLORIDE 40 MEQ: 1500 TABLET, EXTENDED RELEASE ORAL at 09:41

## 2023-10-25 RX ADMIN — INSULIN LISPRO 4 UNITS: 100 INJECTION, SOLUTION INTRAVENOUS; SUBCUTANEOUS at 16:15

## 2023-10-25 RX ADMIN — TIOTROPIUM BROMIDE AND OLODATEROL 2 PUFF: 3.124; 2.736 SPRAY, METERED RESPIRATORY (INHALATION) at 07:59

## 2023-10-25 RX ADMIN — ATORVASTATIN CALCIUM 40 MG: 40 TABLET, FILM COATED ORAL at 08:47

## 2023-10-25 RX ADMIN — ASPIRIN 81 MG: 81 TABLET, CHEWABLE ORAL at 08:47

## 2023-10-25 RX ADMIN — INSULIN GLARGINE 15 UNITS: 100 INJECTION, SOLUTION SUBCUTANEOUS at 08:51

## 2023-10-25 RX ADMIN — GABAPENTIN 300 MG: 300 CAPSULE ORAL at 20:27

## 2023-10-25 RX ADMIN — METOPROLOL SUCCINATE 25 MG: 25 TABLET, EXTENDED RELEASE ORAL at 08:47

## 2023-10-25 RX ADMIN — BUDESONIDE 500 MCG: 0.5 SUSPENSION RESPIRATORY (INHALATION) at 07:59

## 2023-10-25 RX ADMIN — MORPHINE SULFATE 15 MG: 15 TABLET, FILM COATED, EXTENDED RELEASE ORAL at 08:47

## 2023-10-25 RX ADMIN — PANTOPRAZOLE SODIUM 40 MG: 40 TABLET, DELAYED RELEASE ORAL at 06:39

## 2023-10-25 RX ADMIN — BUDESONIDE 500 MCG: 0.5 SUSPENSION RESPIRATORY (INHALATION) at 19:42

## 2023-10-25 RX ADMIN — DULOXETINE HYDROCHLORIDE 60 MG: 60 CAPSULE, DELAYED RELEASE ORAL at 20:26

## 2023-10-25 RX ADMIN — ENOXAPARIN SODIUM 40 MG: 100 INJECTION SUBCUTANEOUS at 08:51

## 2023-10-25 RX ADMIN — CLOPIDOGREL BISULFATE 75 MG: 75 TABLET ORAL at 08:49

## 2023-10-25 RX ADMIN — SACUBITRIL AND VALSARTAN 0.5 TABLET: 24; 26 TABLET, FILM COATED ORAL at 20:26

## 2023-10-25 RX ADMIN — Medication: at 20:04

## 2023-10-25 RX ADMIN — ROFLUMILAST 500 MCG: 500 TABLET ORAL at 11:59

## 2023-10-25 RX ADMIN — INSULIN LISPRO 12 UNITS: 100 INJECTION, SOLUTION INTRAVENOUS; SUBCUTANEOUS at 20:09

## 2023-10-25 RX ADMIN — POTASSIUM CHLORIDE 40 MEQ: 1500 TABLET, EXTENDED RELEASE ORAL at 11:59

## 2023-10-25 RX ADMIN — MAGNESIUM SULFATE HEPTAHYDRATE 2000 MG: 40 INJECTION, SOLUTION INTRAVENOUS at 09:46

## 2023-10-25 RX ADMIN — Medication 10 ML: at 20:27

## 2023-10-25 RX ADMIN — PREDNISONE 4 MG: 1 TABLET ORAL at 08:50

## 2023-10-25 RX ADMIN — DULOXETINE HYDROCHLORIDE 60 MG: 60 CAPSULE, DELAYED RELEASE ORAL at 08:50

## 2023-10-25 RX ADMIN — INSULIN GLARGINE 15 UNITS: 100 INJECTION, SOLUTION SUBCUTANEOUS at 20:26

## 2023-10-25 RX ADMIN — POTASSIUM CHLORIDE 20 MEQ: 1500 TABLET, EXTENDED RELEASE ORAL at 09:41

## 2023-10-25 RX ADMIN — GUAIFENESIN 600 MG: 600 TABLET ORAL at 08:50

## 2023-10-25 RX ADMIN — TORSEMIDE 20 MG: 20 TABLET ORAL at 08:47

## 2023-10-25 ASSESSMENT — PAIN SCALES - GENERAL
PAINLEVEL_OUTOF10: 10
PAINLEVEL_OUTOF10: 8
PAINLEVEL_OUTOF10: 10

## 2023-10-25 ASSESSMENT — PAIN SCALES - WONG BAKER
WONGBAKER_NUMERICALRESPONSE: 0

## 2023-10-25 NOTE — PROGRESS NOTES
This RN received a phone call from [employer] stating that the patient has now been approved. Shared that they will be in tomorrow morning or afternoon for fitting and education. Patient voiced understanding and agreement.

## 2023-10-25 NOTE — PROGRESS NOTES
Patient sitting up in lounge chair at bedside. Wound Care will return after lunch to recheck sacrum wound.

## 2023-10-25 NOTE — PLAN OF CARE
CHF Care Plan      Patient's EF (Ejection Fraction) is less than 40%    Heart Failure Medications:  Diuretics[de-identified] None    (One of the following REQUIRED for EF </= 40%/SYSTOLIC FAILURE but MAY be used in EF% >40%/DIASTOLIC FAILURE)        ACE[de-identified] Lisinopril        ARB[de-identified] None         ARNI[de-identified] None    (Beta Blockers)  NON- Evidenced Based Beta Blocker (for EF% >40%/DIASTOLIC FAILURE): Metoprolol TARTrate- Lopressor    Evidenced Based Beta Blocker::(REQUIRED for EF% <40%/SYSTOLIC FAILURE) Metoprolol SUCCinate- Toprol XL  . .................................................................................................................................................. Healthy Weight Tracking - BMI + Meds 10/20/2023 10/21/2023 10/22/2023 10/22/2023 10/23/2023 10/24/2023 10/25/2023   Weight 191 lb 12.8 oz 189 lb 2.5 oz 179 lb 10.8 oz 189 lb 2.5 oz 189 lb 2.5 oz 189 lb 6 oz 194 lb 8 oz   Height - - - - - - -   Body Mass Index 30.03 kg/m2 29.62 kg/m2 28.13 kg/m2 29.62 kg/m2 29.62 kg/m2 29.65 kg/m2 30.46 kg/m2   Some recent data might be hidden         Patient's weights and intake/output reviewed: Yes    Daily Weight log at bedside, patient/family participation in use of log: \"yes    Patient's Last Weight: 88.225     kg obtained by standing scale. Difference of 2.325   lbs more than last documented weight.       Intake/Output Summary (Last 24 hours) at 10/25/2023 4481  Last data filed at 10/25/2023 0411  Gross per 24 hour   Intake 240 ml   Output 600 ml   Net -360 ml       Education Booklet Provided: yes    Comorbidities Reviewed Yes    Patient has a past medical history of Acute on chronic diastolic heart failure due to coronary artery disease ([address]), Acute respiratory failure with hypoxia ([address]), Atherosclerosis of native artery of right lower extremity with rest pain ([address]), Back pain, Branch retinal vein occlusion, Bronchiectasis with acute exacerbation ([address]), Candidal dermatitis, Cellulitis and abscess of left leg, Cellulitis of left lower extremity, CHF (congestive heart failure) ([facility]), Closed compression fracture of thoracic vertebra (HCC), Closed fracture of facial bone with routine healing, Closed jaw fracture ([address]), Community acquired pneumonia of left lower lobe of lung, Compression fracture of L1 lumbar vertebra (HCC), COPD (chronic obstructive pulmonary disease) ([address]), COPD exacerbation ([address]), COVID, Diabetes mellitus ([address]), Fracture of tibial plateau, closed, left, initial encounter, HCAP (healthcare-associated pneumonia), Minimally displaced zone I fracture of sacrum ([address]), MRSA (methicillin resistant staph aureus) culture positive, MRSA (methicillin resistant Staphylococcus aureus), Mucus plugging of bronchi, NSTEMI (non-ST elevated myocardial infarction) ([address]), Osteomyelitis of mandible, Osteoporosis with pathological fracture, Pneumonia of left lower lobe due to infectious organism, Post herpetic neuralgia, Pressure ulcer of left heel, stage 3 ([address]), Proximal humerus fracture, Rheumatoid arthritis ([address]), Shingles, Sleep apnea, Status post incision and drainage, Surgical wound dehiscence, initial encounter (at [facility] harvest site), Temporal arteritis ([address]), Tobacco use, Tracheomalacia, and Vitreous hemorrhage, right eye ([address]). >>For CHF and Comorbidity documentation on Education Time and Topics, please see Education Tab    Progressive Mobility Assessment:  What is this patient's Current Level of Mobility?: Ambulatory- with Assistance  How was this patient Mobilized today?: Edge of Bed, Up to Chair, Bedside Commode, and Up in Room, ambulated 10   ft                 With Whom? Nurse and PCA                 Level of Difficulty/Assistance: 2x Assist     Pt resting in bed at this time on  1.5 L O2. Pt denies shortness of breath. Pt with pitting lower extremity edema.      Patient and/or Family's stated Goal of Care this Admission: reduce shortness of breath, increase activity tolerance, better understand heart failure and disease management, be more comfortable, and reduce lower extremity edema prior to discharge        :

## 2023-10-25 NOTE — PLAN OF CARE
CHF Care Plan      Patient's EF (Ejection Fraction) is less than 40%    Heart Failure Medications:  Diuretics[de-identified] Other    (One of the following REQUIRED for EF </= 40%/SYSTOLIC FAILURE but MAY be used in EF% >40%/DIASTOLIC FAILURE)        ACE[de-identified] None        ARB[de-identified] None         ARNI[de-identified] Sacubitril/Valsartan-Entresto    (Beta Blockers)  NON- Evidenced Based Beta Blocker (for EF% >40%/DIASTOLIC FAILURE): None    Evidenced Based Beta Blocker::(REQUIRED for EF% <40%/SYSTOLIC FAILURE) Metoprolol SUCCinate- Toprol XL  . .................................................................................................................................................. Healthy Weight Tracking - BMI + Meds 10/20/2023 10/21/2023 10/22/2023 10/22/2023 10/23/2023 10/24/2023 10/25/2023   Weight 191 lb 12.8 oz 189 lb 2.5 oz 179 lb 10.8 oz 189 lb 2.5 oz 189 lb 2.5 oz 189 lb 6 oz 194 lb 8 oz   Height - - - - - - -   Body Mass Index 30.03 kg/m2 29.62 kg/m2 28.13 kg/m2 29.62 kg/m2 29.62 kg/m2 29.65 kg/m2 30.46 kg/m2   Some recent data might be hidden         Patient's weights and intake/output reviewed: Yes    Daily Weight log at bedside, patient/family participation in use of log: \"yes    Patient's Last Weight: 88.2   kg obtained by standing scale. Difference of 3.7   lbs more than last documented weight.       Intake/Output Summary (Last 24 hours) at 10/25/2023 0441  Last data filed at 10/25/2023 0411  Gross per 24 hour   Intake 240 ml   Output 600 ml   Net -360 ml       Education Booklet Provided: yes    Comorbidities Reviewed Yes    Patient has a past medical history of Acute on chronic diastolic heart failure due to coronary artery disease ([address]), Acute respiratory failure with hypoxia ([address]), Atherosclerosis of native artery of right lower extremity with rest pain ([address]), Back pain, Branch retinal vein occlusion, Bronchiectasis with acute exacerbation ([address]), Candidal dermatitis, Cellulitis and abscess of left leg, Cellulitis of left lower extremity, CHF (congestive heart failure) ([facility]), Closed compression fracture of thoracic vertebra (HCC), Closed fracture of facial bone with routine healing, Closed jaw fracture ([address]), Community acquired pneumonia of left lower lobe of lung, Compression fracture of L1 lumbar vertebra (HCC), COPD (chronic obstructive pulmonary disease) ([address]), COPD exacerbation ([address]), COVID, Diabetes mellitus ([address]), Fracture of tibial plateau, closed, left, initial encounter, HCAP (healthcare-associated pneumonia), Minimally displaced zone I fracture of sacrum ([address]), MRSA (methicillin resistant staph aureus) culture positive, MRSA (methicillin resistant Staphylococcus aureus), Mucus plugging of bronchi, NSTEMI (non-ST elevated myocardial infarction) ([address]), Osteomyelitis of mandible, Osteoporosis with pathological fracture, Pneumonia of left lower lobe due to infectious organism, Post herpetic neuralgia, Pressure ulcer of left heel, stage 3 ([address]), Proximal humerus fracture, Rheumatoid arthritis ([address]), Shingles, Sleep apnea, Status post incision and drainage, Surgical wound dehiscence, initial encounter (at [facility] harvest site), Temporal arteritis ([address]), Tobacco use, Tracheomalacia, and Vitreous hemorrhage, right eye ([address]). >>For CHF and Comorbidity documentation on Education Time and Topics, please see Education Tab    Progressive Mobility Assessment:  What is this patient's Current Level of Mobility?: Ambulatory- with Assistance  How was this patient Mobilized today?: Edge of Bed, Up to Chair, Bedside Commode,  Up to Toilet/Shower, and Up in Room, ambulated 5 ft                 With Whom? Nurse and PCA                 Level of Difficulty/Assistance: 1x Assist     Pt resting in bed at this time on  1.5 L O2. Pt denies shortness of breath. Pt with pitting lower extremity edema.      Patient and/or Family's stated Goal of Care this Admission: reduce shortness of breath, increase activity tolerance, better understand heart failure and

## 2023-10-25 NOTE — PLAN OF CARE
Problem: Chronic Conditions and Co-morbidities  Goal: Patient's chronic conditions and co-morbidity symptoms are monitored and maintained or improved  10/25/2023 0853 by Susu Glass RN  Outcome: Progressing    Problem: Pain  Goal: Verbalizes/displays adequate comfort level or baseline comfort level  10/25/2023 0853 by Susu Glass RN  Outcome: Progressing     Problem: Safety - Adult  Goal: Free from fall injury  Outcome: Progressing     CHF Care Plan      Patient's EF (Ejection Fraction) is less than 40%    Heart Failure Medications:  Diuretics[de-identified] None    (One of the following REQUIRED for EF </= 40%/SYSTOLIC FAILURE but MAY be used in EF% >40%/DIASTOLIC FAILURE)        ACE[de-identified] None        ARB[de-identified] None         ARNI[de-identified] Sacubitril/Valsartan-Entresto    (Beta Blockers)  NON- Evidenced Based Beta Blocker (for EF% >40%/DIASTOLIC FAILURE): None    Evidenced Based Beta Blocker::(REQUIRED for EF% <40%/SYSTOLIC FAILURE) Metoprolol SUCCinate- Toprol XL  . .................................................................................................................................................. Healthy Weight Tracking - BMI + Meds 10/20/2023 10/21/2023 10/22/2023 10/22/2023 10/23/2023 10/24/2023 10/25/2023   Weight 191 lb 12.8 oz 189 lb 2.5 oz 179 lb 10.8 oz 189 lb 2.5 oz 189 lb 2.5 oz 189 lb 6 oz 194 lb 8 oz   Height - - - - - - -   Body Mass Index 30.03 kg/m2 29.62 kg/m2 28.13 kg/m2 29.62 kg/m2 29.62 kg/m2 29.65 kg/m2 30.46 kg/m2   Some recent data might be hidden         Patient's weights and intake/output reviewed: Yes    Daily Weight log at bedside, patient/family participation in use of log: \"yes    Patient's Last Weight: 88 kg obtained by standing scale. Difference of 3 kg more than last documented weight.       Intake/Output Summary (Last 24 hours) at 10/25/2023 0856  Last data filed at 10/25/2023 0411  Gross per 24 hour   Intake 240 ml   Output 400 ml   Net -160 ml       Education Booklet Provided: to Chair, ambulated 5 ft                 With Whom? Nurse                 Level of Difficulty/Assistance: 1x Assist     Pt resting in bed at this time on  1.5 L O2. Pt denies shortness of breath. Pt with nonpitting lower extremity edema.      Patient and/or Family's stated Goal of Care this Admission: reduce shortness of breath, increase activity tolerance, better understand heart failure and disease management, be more comfortable, and reduce lower extremity edema prior to discharge        :

## 2023-10-25 NOTE — PLAN OF CARE
Problem: Chronic Conditions and Co-morbidities  Goal: Patient's chronic conditions and co-morbidity symptoms are monitored and maintained or improved  Outcome: Progressing     Problem: Respiratory - Adult  Goal: Achieves optimal ventilation and oxygenation  Outcome: Progressing     Problem: Cardiovascular - Adult  Goal: Maintains optimal cardiac output and hemodynamic stability  Outcome: Progressing

## 2023-10-25 NOTE — PROGRESS NOTES
Patient: Mary Charlton MRN: [id number]  Date of  Admission: 10/19/2023   YOB: 1950  Age: 68 y.o. Sex: female    Unit: [facility]  Room/Bed: 0203/0203-01 Admitting Physician: Gael Alaniz    Attending Physician:  Tha Christy MD         Pulmonary Service Note  Chief Complaint   No chief complaint on file. History Obtained From   electronic medical record     History of Present Illness   This is a 25-year-old female who was transferred from [facility] on 10/19/2023. Patient initially presented to [facility] on the evening of 10/16 for altered mental status. Patient has a known history of chronic nonhealing stage IV sacral ulcer. 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 Patient also had an issue of adrenal crisis and was given stress dose steroids. Patient had an acute arrest for ventricular fibrillation. Patient was coded. And transferred to [facility] for NSTEMI and then transferred from floor to ICU after code blue here at [facility].   Patient lethargic and on BiPAP therapy  Was able to talk to the family         SUBJECTIVE:    Out of the ICU  No chest pains or palpitation  Having some cough but states that when she has this cough she uses albuterol and this usually helps  Patient has have albuterol as needed  Currently on triple therapy  No hemoptysis    ROS:  A comprehensive review of systems was negative except for: Above    OBJECTIVE    Medications    Continuous Infusions:   sodium chloride 5 mL/hr at 10/21/23 0706    dextrose         Scheduled Meds:   potassium chloride  40 mEq Oral Once    potassium chloride  40 mEq Oral Once    potassium chloride  20 mEq Oral Daily    magnesium sulfate  2,000 mg IntraVENous Once    predniSONE  4 mg Oral Daily    metoprolol succinate  25 mg Oral Daily    torsemide  20 mg Oral Daily normal.         Behavior: Behavior normal.         Thought Content: Thought content normal.         Judgment: Judgment normal.              Weight  Weight change: 2.325 kg (5 lb 2 oz)      Labs:   No results for input(s): \"WBC\", \"HGB\", \"HCT\", \"PLT\" in the last 72 hours. Recent Labs     10/23/23  0517 10/24/23  0330 10/25/23  0455    134* 137   K 3.5 3.8 3.0*    98* 103   CO2 32 31 26   BUN 22* 23* 19   GLUCOSE 98 117* 92       Additional labs      Radiology, images personally reviewed. Not indicated  XR CHEST PORTABLE [[id number]] Collected: 10/24/23 8913      Order Status: Completed Updated: 10/24/23 0857     Narrative:       EXAMINATION:   ONE XRAY VIEW OF THE CHEST     10/24/2023 7:12 am     COMPARISON:   10/18/2023, 10/16/2023     HISTORY:   ORDERING SYSTEM PROVIDED HISTORY: Follow-up on lung infiltrates   TECHNOLOGIST PROVIDED HISTORY:   Reason for exam:->Follow-up on lung infiltrates   Reason for Exam: follow up lung infarction     FINDINGS:   A single frontal view of the chest was performed. There is no acute skeletal   abnormality. There is a chronic healed fracture deformity of the proximal   right humerus. There is a stable right arm PICC with tip overlying the mid   SVC. The patient is status post median sternotomy with a left atrial   appendage exclusion device in place. The heart size is mildly enlarged,   though stable. The mediastinal contours are within normal limits. There has   been interval improvement and decrease in size of a small right pleural   effusion. There is a stable small left pleural effusion. Multifocal   airspace disease throughout the right lung has improved from 10/18/2023 with   right basilar opacity remaining. Multifocal opacity within the mid and lower   left lung zones is similar in comparison with 10/18/2023. There is chronic   right apical pleural scarring. There is no evidence of a pneumothorax. Impression:       1.  Stable appropriate position of right arm PICC. 2. Interval decrease in size and improvement in appearance of a small right   pleural effusion. A small left pleural effusion is stable and unchanged. 3. Interval improvement in appearance of multifocal airspace opacity   throughout the right lung. Stable mild multifocal opacity within the mid and   lower left lung zones. 4. Stable cardiomegaly. Assessment:     Principal Problem:    NSTEMI (non-ST elevated myocardial infarction) ([address])  Active Problems:    Acute kidney injury superimposed on CKD ([facility])    Rheumatoid arthritis ([address])    Coronary artery disease    Type 2 diabetes mellitus with diabetic peripheral angiopathy without gangrene, with long-term current use of insulin ([facility])    Pressure ulcer of coccygeal region, stage 4 ([facility])    Aortocoronary bypass status    Acute on chronic HFrEF (heart failure with reduced ejection fraction) ([address])    Acute adrenal crisis ([address])    Acute metabolic encephalopathy    Ischemic cardiomyopathy    Ventricular tachyarrhythmia (HCC)    Ventricular fibrillation ([address])  Resolved Problems:    * No resolved hospital problems.  *      Discussion /Plan:   CAD status post CABG x3 in May 2021  CHF     Status post ventricular fibrillation cardiac arrest  Patient had ROSC with 2 minutes  Started on amiodarone  Cardiology consulted  Cardiac cath done 10/20/2023  Report pending  Apparently normal     EP cardiology to get LifeVest for patient     Continue with  Aspirin 81 mg daily  Lipitor 40 mg daily  Plavix 75 mg daily  Metoprolol xl 25 mg twice daily  Entresto 0.5 twice daily-on hold  Demadex 20 mg daily        COPD, bronchiectasis  Chronic MAC  Obstructive sleep apnea  History of tobacco use  Tracheomalacia     Continue with  Heated high flow alternating with Pap therapy  Zyrtec 10 mg daily  Albuterol nebulized 4 times daily-can be given more often as the patient is coughing  Daliresp 500 mcg daily  Stiolto 2 puffs daily  Pulmicort-nebulized twice

## 2023-10-25 NOTE — PROGRESS NOTES
Patient still up in chair with family now in room. Will try again to complete re check later in afternoon.

## 2023-10-26 LAB
ALBUMIN SERPL-MCNC: 2.5 G/DL (ref 3.4–5)
ANION GAP SERPL CALCULATED.3IONS-SCNC: 8 MMOL/L (ref 3–16)
BUN SERPL-MCNC: 22 MG/DL (ref 7–20)
CALCIUM SERPL-MCNC: 8.4 MG/DL (ref 8.3–10.6)
CHLORIDE SERPL-SCNC: 98 MMOL/L (ref 99–110)
CO2 SERPL-SCNC: 29 MMOL/L (ref 21–32)
CREAT SERPL-MCNC: 1 MG/DL (ref 0.6–1.2)
GFR SERPLBLD CREATININE-BSD FMLA CKD-EPI: 60 ML/MIN/{1.73_M2}
GLUCOSE BLD-MCNC: 124 MG/DL (ref 70–99)
GLUCOSE BLD-MCNC: 194 MG/DL (ref 70–99)
GLUCOSE BLD-MCNC: 220 MG/DL (ref 70–99)
GLUCOSE BLD-MCNC: 234 MG/DL (ref 70–99)
GLUCOSE BLD-MCNC: 356 MG/DL (ref 70–99)
GLUCOSE BLD-MCNC: 65 MG/DL (ref 70–99)
GLUCOSE BLD-MCNC: 70 MG/DL (ref 70–99)
GLUCOSE SERPL-MCNC: 60 MG/DL (ref 70–99)
MAGNESIUM SERPL-MCNC: 2.1 MG/DL (ref 1.8–2.4)
NT-PROBNP SERPL-MCNC: 6336 PG/ML (ref 0–124)
PERFORMED ON: ABNORMAL
PERFORMED ON: NORMAL
PHOSPHATE SERPL-MCNC: 2 MG/DL (ref 2.5–4.9)
POTASSIUM SERPL-SCNC: 4.1 MMOL/L (ref 3.5–5.1)
SODIUM SERPL-SCNC: 135 MMOL/L (ref 136–145)

## 2023-10-26 PROCEDURE — 6370000000 HC RX 637 (ALT 250 FOR IP): Performed by: NURSE PRACTITIONER

## 2023-10-26 PROCEDURE — 97110 THERAPEUTIC EXERCISES: CPT

## 2023-10-26 PROCEDURE — 94640 AIRWAY INHALATION TREATMENT: CPT

## 2023-10-26 PROCEDURE — 94761 N-INVAS EAR/PLS OXIMETRY MLT: CPT

## 2023-10-26 PROCEDURE — 94669 MECHANICAL CHEST WALL OSCILL: CPT

## 2023-10-26 PROCEDURE — 2580000003 HC RX 258: Performed by: INTERNAL MEDICINE

## 2023-10-26 PROCEDURE — 6360000002 HC RX W HCPCS: Performed by: INTERNAL MEDICINE

## 2023-10-26 PROCEDURE — 83880 ASSAY OF NATRIURETIC PEPTIDE: CPT

## 2023-10-26 PROCEDURE — 97530 THERAPEUTIC ACTIVITIES: CPT

## 2023-10-26 PROCEDURE — 99232 SBSQ HOSP IP/OBS MODERATE 35: CPT | Performed by: INTERNAL MEDICINE

## 2023-10-26 PROCEDURE — 83735 ASSAY OF MAGNESIUM: CPT

## 2023-10-26 PROCEDURE — 99232 SBSQ HOSP IP/OBS MODERATE 35: CPT | Performed by: NURSE PRACTITIONER

## 2023-10-26 PROCEDURE — 92526 ORAL FUNCTION THERAPY: CPT

## 2023-10-26 PROCEDURE — 6370000000 HC RX 637 (ALT 250 FOR IP): Performed by: INTERNAL MEDICINE

## 2023-10-26 PROCEDURE — 80069 RENAL FUNCTION PANEL: CPT

## 2023-10-26 PROCEDURE — 2700000000 HC OXYGEN THERAPY PER DAY

## 2023-10-26 PROCEDURE — 2060000000 HC ICU INTERMEDIATE R&B

## 2023-10-26 RX ORDER — SPIRONOLACTONE 25 MG/1
25 TABLET ORAL DAILY
Status: DISCONTINUED | OUTPATIENT
Start: 2023-10-26 | End: 2023-10-28 | Stop reason: HOSPADM

## 2023-10-26 RX ORDER — INSULIN GLARGINE 100 [IU]/ML
10 INJECTION, SOLUTION SUBCUTANEOUS ONCE
Status: COMPLETED | OUTPATIENT
Start: 2023-10-26 | End: 2023-10-26

## 2023-10-26 RX ORDER — TORSEMIDE 20 MG/1
20 TABLET ORAL ONCE
Status: COMPLETED | OUTPATIENT
Start: 2023-10-26 | End: 2023-10-26

## 2023-10-26 RX ORDER — INSULIN GLARGINE 100 [IU]/ML
10 INJECTION, SOLUTION SUBCUTANEOUS 2 TIMES DAILY
Status: DISCONTINUED | OUTPATIENT
Start: 2023-10-26 | End: 2023-10-28 | Stop reason: HOSPADM

## 2023-10-26 RX ADMIN — SPIRONOLACTONE 25 MG: 25 TABLET ORAL at 12:21

## 2023-10-26 RX ADMIN — INSULIN LISPRO 4 UNITS: 100 INJECTION, SOLUTION INTRAVENOUS; SUBCUTANEOUS at 21:04

## 2023-10-26 RX ADMIN — DULOXETINE HYDROCHLORIDE 60 MG: 60 CAPSULE, DELAYED RELEASE ORAL at 21:03

## 2023-10-26 RX ADMIN — GUAIFENESIN 600 MG: 600 TABLET ORAL at 21:03

## 2023-10-26 RX ADMIN — PREDNISONE 4 MG: 1 TABLET ORAL at 09:44

## 2023-10-26 RX ADMIN — ASPIRIN 81 MG: 81 TABLET, CHEWABLE ORAL at 09:42

## 2023-10-26 RX ADMIN — GABAPENTIN 300 MG: 300 CAPSULE ORAL at 21:03

## 2023-10-26 RX ADMIN — GUAIFENESIN 600 MG: 600 TABLET ORAL at 09:43

## 2023-10-26 RX ADMIN — METOPROLOL SUCCINATE 25 MG: 25 TABLET, EXTENDED RELEASE ORAL at 09:43

## 2023-10-26 RX ADMIN — MORPHINE SULFATE 15 MG: 15 TABLET, FILM COATED, EXTENDED RELEASE ORAL at 09:43

## 2023-10-26 RX ADMIN — Medication 10 ML: at 09:42

## 2023-10-26 RX ADMIN — ATORVASTATIN CALCIUM 40 MG: 40 TABLET, FILM COATED ORAL at 09:42

## 2023-10-26 RX ADMIN — Medication: at 21:04

## 2023-10-26 RX ADMIN — TORSEMIDE 20 MG: 20 TABLET ORAL at 12:21

## 2023-10-26 RX ADMIN — MORPHINE SULFATE 15 MG: 15 TABLET, FILM COATED, EXTENDED RELEASE ORAL at 22:48

## 2023-10-26 RX ADMIN — ROFLUMILAST 500 MCG: 500 TABLET ORAL at 09:44

## 2023-10-26 RX ADMIN — CETIRIZINE HYDROCHLORIDE 10 MG: 10 TABLET, FILM COATED ORAL at 09:43

## 2023-10-26 RX ADMIN — DULOXETINE HYDROCHLORIDE 60 MG: 60 CAPSULE, DELAYED RELEASE ORAL at 09:42

## 2023-10-26 RX ADMIN — INSULIN GLARGINE 10 UNITS: 100 INJECTION, SOLUTION SUBCUTANEOUS at 21:03

## 2023-10-26 RX ADMIN — SACUBITRIL AND VALSARTAN 0.5 TABLET: 24; 26 TABLET, FILM COATED ORAL at 21:03

## 2023-10-26 RX ADMIN — CLOPIDOGREL BISULFATE 75 MG: 75 TABLET ORAL at 09:42

## 2023-10-26 RX ADMIN — ENOXAPARIN SODIUM 40 MG: 100 INJECTION SUBCUTANEOUS at 09:41

## 2023-10-26 RX ADMIN — Medication 10 ML: at 21:04

## 2023-10-26 RX ADMIN — INSULIN GLARGINE 10 UNITS: 100 INJECTION, SOLUTION SUBCUTANEOUS at 13:28

## 2023-10-26 RX ADMIN — SACUBITRIL AND VALSARTAN 0.5 TABLET: 24; 26 TABLET, FILM COATED ORAL at 09:42

## 2023-10-26 RX ADMIN — TIOTROPIUM BROMIDE AND OLODATEROL 2 PUFF: 3.124; 2.736 SPRAY, METERED RESPIRATORY (INHALATION) at 07:59

## 2023-10-26 RX ADMIN — INSULIN LISPRO 4 UNITS: 100 INJECTION, SOLUTION INTRAVENOUS; SUBCUTANEOUS at 17:04

## 2023-10-26 RX ADMIN — GABAPENTIN 300 MG: 300 CAPSULE ORAL at 09:43

## 2023-10-26 RX ADMIN — PANTOPRAZOLE SODIUM 40 MG: 40 TABLET, DELAYED RELEASE ORAL at 06:00

## 2023-10-26 RX ADMIN — POTASSIUM CHLORIDE 20 MEQ: 1500 TABLET, EXTENDED RELEASE ORAL at 09:43

## 2023-10-26 RX ADMIN — INSULIN LISPRO 16 UNITS: 100 INJECTION, SOLUTION INTRAVENOUS; SUBCUTANEOUS at 13:28

## 2023-10-26 RX ADMIN — BUDESONIDE 500 MCG: 0.5 SUSPENSION RESPIRATORY (INHALATION) at 07:57

## 2023-10-26 RX ADMIN — BUDESONIDE 500 MCG: 0.5 SUSPENSION RESPIRATORY (INHALATION) at 19:49

## 2023-10-26 RX ADMIN — TORSEMIDE 20 MG: 20 TABLET ORAL at 09:43

## 2023-10-26 ASSESSMENT — PAIN DESCRIPTION - DESCRIPTORS
DESCRIPTORS: ACHING
DESCRIPTORS: ACHING

## 2023-10-26 ASSESSMENT — PAIN DESCRIPTION - ORIENTATION
ORIENTATION: MID
ORIENTATION: LOWER

## 2023-10-26 ASSESSMENT — PAIN SCALES - WONG BAKER
WONGBAKER_NUMERICALRESPONSE: 0
WONGBAKER_NUMERICALRESPONSE: 0

## 2023-10-26 ASSESSMENT — PAIN DESCRIPTION - LOCATION
LOCATION: BACK
LOCATION: BUTTOCKS

## 2023-10-26 ASSESSMENT — PAIN SCALES - GENERAL: PAINLEVEL_OUTOF10: 8

## 2023-10-26 ASSESSMENT — PAIN - FUNCTIONAL ASSESSMENT: PAIN_FUNCTIONAL_ASSESSMENT: ACTIVITIES ARE NOT PREVENTED

## 2023-10-26 NOTE — CARE COORDINATION
Spoke with MD who states patient will likely not discharge today. Cardiology still following and patient will need 1-2 more days of diuresing. Patient is from home alone but has good family support. She is active with [address] home care and will resume at discharge.

## 2023-10-26 NOTE — PROGRESS NOTES
Physical Therapy  Facility/Department: [facility] A2 CARD TELEMETRY  Daily Treatment Note  NAME: Almita Corrigan  : 1950  MRN: [id number]    Date of Service: 10/26/2023    Discharge Recommendations:  24 hour supervision or assist, Home with Home health PT   PT Equipment Recommendations  Equipment Needed: No    Patient Diagnosis(es): The encounter diagnosis was Ventricular tachyarrhythmia ([address]). Assessment   Assessment: Pt seen for follow up PT session this morning focusing on LE exercises and HF eduation. Pt politely declining functional mobility at this time d/t back pain and fatigue. Able to complete LE exercises reclined in chair with intermittent rest breaks. PT provided pt with CHF education, including Delfina RPE scale, HF zones, daily weights, and LE/UE exercises; pt verbalizing good understanding. Continue to recommend home with 24hr assist and HHPT upon d/c.   Activity Tolerance: Patient tolerated treatment well  Equipment Needed: No     [facility] 6 Clicks Inpatient Mobility:  AM-PAC Basic Mobility - Inpatient   How much help is needed turning from your back to your side while in a flat bed without using bedrails?: A Little  How much help is needed moving from lying on your back to sitting on the side of a flat bed without using bedrails?: A Lot  How much help is needed moving to and from a bed to a chair?: A Little  How much help is needed standing up from a chair using your arms?: A Little  How much help is needed walking in hospital room?: A Little  How much help is needed climbing 3-5 steps with a railing?: A Lot  AM-PAC Inpatient Mobility Raw Score : 16  AM-PAC Inpatient T-Scale Score : 40.78  Mobility Inpatient CMS 0-100% Score: 54.16  Mobility Inpatient CMS G-Code Modifier : CK      Plan    Physcial Therapy Plan  General Plan: 3-5 times per week  Current Treatment Recommendations: Strengthening;ROM;Balance training;Gait training;Home exercise program;Functional mobility training;Stair training; Therapeutic activities; Safety education & training;Transfer training;Patient/Caregiver education & training; Endurance training;Pain management     Restrictions  Restrictions/Precautions  Restrictions/Precautions: Fall Risk, General Precautions  Required Braces or Orthoses?: No  Position Activity Restriction  Other position/activity restrictions: purewick, IV     Subjective    Subjective  Subjective: Pt resting in chair upon arrival and agreeable to PT session  Pain: Pt reports back pain (chronic), not rated  Orientation  Overall Orientation Status: Within Normal Limits     Objective   Vitals  Pulse: 80  Heart Rate Source: Monitor  BP: 128/63  BP Location: Left upper arm  BP Method: Automatic  Patient Position: Up in chair  MAP (Calculated): 85  SpO2: 95 %  O2 Device: None (Room air)    Bed Mobility Training  Bed Mobility Training: No (pt sitting in chair upon arrival)  Transfer Training  Transfer Training: No (pt declining functional transfers at this time d/t fatigue and back pain)       PT Exercises  Exercise Treatment: LE exercises (completed reclined in chair): ankle pumps x 15, quad sets x 15, glute sets x 15, heelslides x 10       Safety Devices  Type of Devices: All fall risk precautions in place;Call light within reach; Chair alarm in place; Patient at risk for falls; Left in chair;Nurse notified        PHYSICAL THERAPY HEART FAILURE EDUCATION:  PHYSICAL THERAPY HEART FAILURE EDUCATION GOALS:  1. Identifying HF Activity Zone with the Self Check Plan prior to exercises or walking    Patient educated in and demonstrated/verbalized understanding Identifying HF activity zone with the Self Check Plan referencing Red/Yellow/Green Light Symbol prior to exercises or walking  to appropriately determine daily activity level. 2.Rating self on MUKESH scale of perceived exertion   Patient educated in and demonstrated and/or verbalized understanding Rating self on MUKESH scale of perceived exertion  3.  HF Therapeutic instruction  [x]Demonstration  [x]Written handout      Goals  Short Term Goals  Time Frame for Short Term Goals: 10/26/23--goals extended to 11/2/23 based on longer than expected LOS  Short Term Goal 1: pt will complete bed mobility with min A  Short Term Goal 2: pt will complete transfers with min A--10/24 GOAL MET  Short Term Goal 3: pt will ambulte 20ft with LRAD and CGA  Short Term Goal 4: pt will participate in 12-15 reps of BLE exercises by 10/22/23-- GOAL MET 10/26  Short Term Goal 5: Pt will meet 3/5 PT related CHF goals-- GOAL MET 10/26  Patient Goals   Patient Goals : \"To go home\"    Education  Patient Education  Education Given To: Patient  Education Provided: Role of Therapy;Plan of Care;Family Education;Transfer Training;Equipment  Education Provided Comments: PT related CHF education provided, including in HF zones, Delfina RPE scale, LE/UE exercises, and daily weights  Education Method: Verbal  Barriers to Learning: None  Education Outcome: Verbalized understanding;Demonstrated understanding;Continued education needed    Therapy Time   Individual Concurrent Group Co-treatment   Time In 1032         Time Out 1055         Minutes 23         Timed Code Treatment Minutes: [address], PT, DPT       If pt is unable to be seen after this session, please let this note serve as discharge summary. Please see case management note for discharge disposition. Thank you.

## 2023-10-26 NOTE — PLAN OF CARE
CHF Care Plan      Patient's EF (Ejection Fraction) is less than 40%    Heart Failure Medications:  Diuretics[de-identified] Torsemide    (One of the following REQUIRED for EF </= 40%/SYSTOLIC FAILURE but MAY be used in EF% >40%/DIASTOLIC FAILURE)        ACE[de-identified] None        ARB[de-identified] None         ARNI[de-identified] None    (Beta Blockers)  NON- Evidenced Based Beta Blocker (for EF% >40%/DIASTOLIC FAILURE): None    Evidenced Based Beta Blocker::(REQUIRED for EF% <40%/SYSTOLIC FAILURE) Metoprolol SUCCinate- Toprol XL  . .................................................................................................................................................. Healthy Weight Tracking - BMI + Meds 10/21/2023 10/22/2023 10/22/2023 10/23/2023 10/24/2023 10/25/2023 10/26/2023   Weight 189 lb 2.5 oz 179 lb 10.8 oz 189 lb 2.5 oz 189 lb 2.5 oz 189 lb 6 oz 194 lb 8 oz 194 lb 10.7 oz   Height - - - - - - -   Body Mass Index 29.62 kg/m2 28.13 kg/m2 29.62 kg/m2 29.62 kg/m2 29.65 kg/m2 30.46 kg/m2 30.48 kg/m2   Some recent data might be hidden         Patient's weights and intake/output reviewed: Yes    Daily Weight log at bedside, patient/family participation in use of log: \"yes    Patient's Last Weight: 194 lbs obtained by standing scale. Difference of 0 lbs than last documented weight.       Intake/Output Summary (Last 24 hours) at 10/26/2023 8759  Last data filed at 10/25/2023 2200  Gross per 24 hour   Intake 360 ml   Output 800 ml   Net -440 ml       Education Booklet Provided: yes    Comorbidities Reviewed Yes    Patient has a past medical history of Acute on chronic diastolic heart failure due to coronary artery disease ([address]), Acute respiratory failure with hypoxia ([address]), Atherosclerosis of native artery of right lower extremity with rest pain ([address]), Back pain, Branch retinal vein occlusion, Bronchiectasis with acute exacerbation ([address]), Candidal dermatitis, Cellulitis and abscess of left leg, Cellulitis of left lower extremity, CHF (congestive heart failure) ([address]), Closed compression fracture of thoracic vertebra (HCC), Closed fracture of facial bone with routine healing, Closed jaw fracture ([address]), Community acquired pneumonia of left lower lobe of lung, Compression fracture of L1 lumbar vertebra (HCC), COPD (chronic obstructive pulmonary disease) ([address]), COPD exacerbation ([address]), COVID, Diabetes mellitus ([address]), Fracture of tibial plateau, closed, left, initial encounter, HCAP (healthcare-associated pneumonia), Minimally displaced zone I fracture of sacrum ([address]), MRSA (methicillin resistant staph aureus) culture positive, MRSA (methicillin resistant Staphylococcus aureus), Mucus plugging of bronchi, NSTEMI (non-ST elevated myocardial infarction) ([address]), Osteomyelitis of mandible, Osteoporosis with pathological fracture, Pneumonia of left lower lobe due to infectious organism, Post herpetic neuralgia, Pressure ulcer of left heel, stage 3 ([address]), Proximal humerus fracture, Rheumatoid arthritis ([address]), Shingles, Sleep apnea, Status post incision and drainage, Surgical wound dehiscence, initial encounter (at [facility] site), Temporal arteritis ([address]), Tobacco use, Tracheomalacia, and Vitreous hemorrhage, right eye ([address]). >>For CHF and Comorbidity documentation on Education Time and Topics, please see Education Tab    Progressive Mobility Assessment:  What is this patient's Current Level of Mobility?: Ambulatory- with Assistance  How was this patient Mobilized today?: Edge of Bed, Up to Chair, and Bedside Commode, ambulated 5 ft                 With Whom? Nurse                 Level of Difficulty/Assistance: 1x Assist     Pt up in chair at this time on  3 L O2. Pt with complaints of shortness of breath. Pt with pitting lower extremity edema.      Patient and/or Family's stated Goal of Care this Admission: reduce shortness of breath, increase activity tolerance, better understand heart failure and disease management, be more comfortable, and reduce lower

## 2023-10-26 NOTE — PROGRESS NOTES
Patient: Braden Jain MRN: [id number]  Date of  Admission: 10/19/2023   YOB: 1950  Age: 68 y.o. Sex: female    Unit: [facility] A2 CARD TELEMETRY  Room/Bed: 0203/0203-01 Admitting Physician: Luis Carlos Llanos    Attending Physician:  Chelsea Aguilar MD         Pulmonary Service Note  Chief Complaint   No chief complaint on file. History Obtained From   electronic medical record     History of Present Illness   This is a 71-year-old female who was transferred from [facility] on 10/19/2023. Patient initially presented to [facility] on the evening of 10/16 for altered mental status. Patient has a known history of chronic nonhealing stage IV sacral ulcer. 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 Patient also had an issue of adrenal crisis and was given stress dose steroids. Patient had an acute arrest for ventricular fibrillation. Patient was coded. And transferred to [facility] for NSTEMI and then transferred from floor to ICU after code blue here at [facility].   Patient lethargic and on BiPAP therapy  Was able to talk to the family         SUBJECTIVE:    Patient was able to get LifeVest  Patient doing well  No chest pains or palpitations  No nausea vomiting  No dysuria hematuria  No hemoptysis    ROS:  A comprehensive review of systems was negative except for: Above    OBJECTIVE    Medications    Continuous Infusions:   sodium chloride 5 mL/hr at 10/21/23 0706    dextrose         Scheduled Meds:   potassium chloride  20 mEq Oral Daily    predniSONE  4 mg Oral Daily    metoprolol succinate  25 mg Oral Daily    torsemide  20 mg Oral Daily    enoxaparin  40 mg SubCUTAneous Daily    insulin glargine  15 Units SubCUTAneous BID    guaiFENesin  600 mg Oral BID    tiotropium-olodaterol  2 puff Inhalation Daily    wound/burn dressing   Topical Daily    sodium region, stage 4 [facility])    Aortocoronary bypass status    Acute on chronic HFrEF (heart failure with reduced ejection fraction) (HCC)    Acute adrenal crisis ([address])    Acute metabolic encephalopathy    Ischemic cardiomyopathy    Ventricular tachyarrhythmia (HCC)    Ventricular fibrillation (HCC)  Resolved Problems:    * No resolved hospital problems.  *      Discussion /Plan:   CAD status post CABG x3 in May 2021  CHF     Status post ventricular fibrillation cardiac arrest  Patient had ROSC with 2 minutes  Started on amiodarone  Cardiology consulted  Cardiac cath done 10/20/2023  Report pending  Apparently normal     EP cardiology to get LifeVest for patient     Continue with  Aspirin 81 mg daily  Lipitor 40 mg daily  Plavix 75 mg daily  Metoprolol xl 25 mg twice daily  Entresto 0.5 twice daily  Demadex 20 mg daily        COPD, bronchiectasis  Chronic MAC  Obstructive sleep apnea  History of tobacco use  Tracheomalacia     Continue with  Zyrtec 10 mg daily-continue as an outpatient  Albuterol nebulized 4 times daily-can be given more often as the patient is coughing  Daliresp 500 mcg daily-continue as an outpatient  Stiolto 2 puffs daily  Pulmicort-nebulized twice daily  Mucinex twice daily    Patient normally on triple therapy, Trelegy    Upon discharge the patient can go back on her home Trelegy  We will stop the Stiolto and the Pulmicort upon discharge     Patient is on 2 L of oxygen  Can wean off of the oxygen normally does not wear oxygen      Obstructive sleep apnea  Most likely the patient's desaturations are occurring at night when the patient is asleep  I have talked to her about getting a sleep study as an outpatient which she will do once she is released  At this point the O2 saturations are 97% on 2 to 3 L she does not need oxygen in the daytime  Okay to wean off of oxygen     Rheumatological  Patient has rheumatoid arthritis  Continue with  Prednisone 4 mg daily      Normally on prednisone 4 mg and abatacept              MD Rodney Damon Pulmonary, Critical Care and Sleep Medicine  [phone]      Please note that some or all of this record was generated using voice recognition software. If there are any questions about the content of this document, please contact the author as some errors in transcription may have occurred.

## 2023-10-26 NOTE — PROGRESS NOTES
Occupational Therapy  Attempted OT tx. Pt was in room with family and speaking with MD. Lorenza Summers OT

## 2023-10-26 NOTE — PLAN OF CARE
CHF Care Plan      Patient's EF (Ejection Fraction) is less than 40%    Heart Failure Medications:  Diuretics[de-identified] None    (One of the following REQUIRED for EF </= 40%/SYSTOLIC FAILURE but MAY be used in EF% >40%/DIASTOLIC FAILURE)        ACE[de-identified] None        ARB[de-identified] None         ARNI[de-identified] None    (Beta Blockers)  NON- Evidenced Based Beta Blocker (for EF% >40%/DIASTOLIC FAILURE): None    Evidenced Based Beta Blocker::(REQUIRED for EF% <40%/SYSTOLIC FAILURE) Metoprolol SUCCinate- Toprol XL  . .................................................................................................................................................. Healthy Weight Tracking - BMI + Meds 10/21/2023 10/22/2023 10/22/2023 10/23/2023 10/24/2023 10/25/2023 10/26/2023   Weight 189 lb 2.5 oz 179 lb 10.8 oz 189 lb 2.5 oz 189 lb 2.5 oz 189 lb 6 oz 194 lb 8 oz 194 lb 10.7 oz   Height - - - - - - -   Body Mass Index 29.62 kg/m2 28.13 kg/m2 29.62 kg/m2 29.62 kg/m2 29.65 kg/m2 30.46 kg/m2 30.48 kg/m2   Some recent data might be hidden         Patient's weights and intake/output reviewed: No    Daily Weight log at bedside, patient/family participation in use of log: no    Patient's Last Weight: 88.2kg   kg obtained by standing scale. Difference of 1   lbs more than last documented weight.       Intake/Output Summary (Last 24 hours) at 10/26/2023 0441  Last data filed at 10/25/2023 2200  Gross per 24 hour   Intake 360 ml   Output 800 ml   Net -440 ml       Education Booklet Provided: yes    Comorbidities Reviewed Yes    Patient has a past medical history of Acute on chronic diastolic heart failure due to coronary artery disease ([address]), Acute respiratory failure with hypoxia ([address]), Atherosclerosis of native artery of right lower extremity with rest pain ([address]), Back pain, Branch retinal vein occlusion, Bronchiectasis with acute exacerbation ([address]), Candidal dermatitis, Cellulitis and abscess of left leg, Cellulitis of left lower extremity, CHF (congestive

## 2023-10-26 NOTE — PLAN OF CARE
Problem: Discharge Planning  Goal: Discharge to home or other facility with appropriate resources  Outcome: Progressing     Problem: Chronic Conditions and Co-morbidities  Goal: Patient's chronic conditions and co-morbidity symptoms are monitored and maintained or improved  10/26/2023 0842 by Shoaib Jernigan RN  Outcome: Progressing  Pt educated on the importance of a carb control diet. Monitoring pt's blood glucose AC/HS. Sliding scale insulin given as ordered according to pt's blood glucose. Will continue to monitor.        Problem: Safety - Adult  Goal: Free from fall injury  10/26/2023 0842 by Shoaib Jernigan RN  Outcome: Progressing     Problem: Respiratory - Adult  Goal: Achieves optimal ventilation and oxygenation  Outcome: Progressing

## 2023-10-26 NOTE — PROGRESS NOTES
Hospital Medicine Progress Note      Date of Admission: 10/19/2023  Hospital Day: 8    Chief Admission Complaint:  SOB     Subjective:  Reports some increased SOB today. No significant LE edema. Mild hypoglycemia noted, asymptomatic. Presenting Admission History:     Polina Tafoya is a 68 y.o. female with pmh of hypertension, CAD (CABG), BIMAL clip, cardiomyopathy (EF has improved from 30% to 50%), COPD/bronchiectasis, tracheomalacia, obstructive sleep apnea, diabetes type 2, rheumatoid arthritis (on prednisone 4 mg daily), CKD 3 and chronic nonhealing stage IV pressure ulcer admitted. Recently admitted at [facility]. Found to have Nonhealing stage IV pressure ulcer-MRI consistent with osteomyelitis. ID consulted-continue wound care, no need for antibiotics for chronic wound. Treated for Hypotension with pressors and IV hydrocortisone; thought to be 2/2 adrenal crisis. Treated for Bronchiectasis. Treated for NSTEMI, started on Heparin drip and transferred to [facility]. Upon arrival to [facility] she suffered a V-fib cardiac arrest.  ROSC attained in less than 2 minutes. Assessment/Plan:      Current Principal Problem:  NSTEMI (non-ST elevated myocardial infarction) ([address])    V-fib cardiac arrest: Status post amiodarone drip. Suspect 2/2 NSTEMI/Ischemic Cardiomyopathy. Neurologically intact. Life Vest obtained. NSTEMI:  s/p  heparin drip. Status post nitroglycerin drip. Cardiac catheterization on 10/20-occluded SVG to OM. Medical management recommended. On metoprolol, aspirin, Plavix and Lipitor. Ischemic Cardiomyopathy: Echo 10/19-EF 30% with regional wall motion abnormality. IV diuresis completed. Changed to Torsemide along with Aldactone. Continue Entresto, Metoprolol. Life Vest in place. Hypotension: Status post pressors. This is due to adrenal crisis (patient on prednisone 4 mg daily at home). S/p IV -hydrocortisone. Switched back to po prednisone.   Resolved      Chronic sacral nonhealing stage IV

## 2023-10-26 NOTE — PROGRESS NOTES
Comprehensive Nutrition Assessment    Type and Reason for Visit:  Reassess    Nutrition Recommendations/Plan:   Continue carb control diet   Modify ONS to Ensure HP BID   Add Jimbo daily   Encourage PO intakes as tolerated   Monitor nutrition adequacy, pertinent labs, bowel habits, wt changes, and clinical progress     Malnutrition Assessment:  Malnutrition Status: At risk for malnutrition (Comment) (10/23/23 0640)    Context:  Acute Illness     Findings of the 6 clinical characteristics of malnutrition:  Energy Intake:  Mild decrease in energy intake (Comment)    Nutrition Assessment:    Follow up: Pt continues on a carb controlled diet with PO intakes of % per EMR. Pt reports appetite good, favorable to food provided. Only drinking 2 ONS daily. Recommend modifying to lower carbohydrate, high protein ONS and decreasing to BID. Pt agreeable to trial Jimbo for additional wound healing support. Blood sugars labile, on steroids and MD managing insulin. Will continue to monitor. Nutrition Related Findings:    Active BS. BM on 10/25. +1 pitting BLE edema. Na 135. Phos 2.0. BG  past 24 hrs. Wound Type: Pressure Injury, Stage IV       Current Nutrition Intake & Therapies:    Average Meal Intake: 26-50%, 51-75%, %  Average Supplements Intake: Unable to assess  ADULT DIET; Regular; 5 carb choices (75 gm/meal)  ADULT ORAL NUTRITION SUPPLEMENT; Breakfast, Dinner, Lunch; Diabetic Oral Supplement    Anthropometric Measures:  Height: 170.2 cm (5' 7.01\")  Ideal Body Weight (IBW): 135 lbs (61 kg)    Admission Body Weight: 85.3 kg (188 lb) (bed scale)  Current Body Weight: 88 kg (194 lb), 138.8 % IBW. Weight Source: Standing Scale  Current BMI (kg/m2): 30.4        Weight Adjustment For: No Adjustment                 BMI Categories: Obese Class 1 (BMI 30.0-34. 9)    Estimated Daily Nutrient Needs:  Energy Requirements Based On: Kcal/kg (28-33 kcals/kg)  Weight Used for Energy Requirements: Ideal (61 kg)  Energy

## 2023-10-26 NOTE — PLAN OF CARE
Problem: Chronic Conditions and Co-morbidities  Goal: Patient's chronic conditions and co-morbidity symptoms are monitored and maintained or improved  10/25/2023 2014 by Jakob Lobo RN  Outcome: Progressing     Problem: Cardiovascular - Adult  Goal: Maintains optimal cardiac output and hemodynamic stability  Outcome: Progressing     Problem: Pain  Goal: Verbalizes/displays adequate comfort level or baseline comfort level  10/25/2023 0853 by Colleen Zamora RN  Outcome: Progressing     Problem: Safety - Adult  Goal: Free from fall injury  10/25/2023 2014 by Jakob Lobo RN  Outcome: Progressing

## 2023-10-26 NOTE — PROGRESS NOTES
Speech Language Pathology  Dysphagia Treatment/Follow-Up Note  Facility/Department: [facility] C2 CARD TELEMETRY    Recommendations:  Diet Recommendation: IDDSI 7 Regular Solids (encouraged to choose softer foods); IDDSI 0 Thin Liquids; Meds PO as tolerated     Risk Management: upright for all intake, stay upright for at least 30 mins after intake, small bites/sips, oral care 2-3x/day to reduce adverse affects in the event of aspiration, increase physical mobility as able, alternate bites/sips, slow rate of intake, general GERD precautions, and general aspiration precautions    *Ongoing ST is not indicated at this time. Recommend re-consulting SLP if change in respiratory status or overt s/s aspiration develops. NAME:Gris Taveras  : 1950 (74 y.o.)   MRN: [id number]  ROOM:   ADMISSION DATE: 10/19/2023  PATIENT DIAGNOSIS(ES): NSTEMI (non-ST elevated myocardial infarction) ([address]) [I21.4]  Allergies   Allergen Reactions    Atenolol Other (See Comments)     Cough         DATE ONSET: 10/19/2023    Pain: The patient does not complain of pain       Current Diet: ADULT ORAL NUTRITION SUPPLEMENT; Breakfast, Dinner; Low Calorie/High Protein Oral Supplement  ADULT ORAL NUTRITION SUPPLEMENT; Lunch; Wound Healing Oral Supplement  ADULT DIET; Regular; 5 carb choices (75 gm/meal); Low Fat/Low Chol/High Fiber/MITCHELL      Diet Tolerance:  Patient tolerating current diet level without signs/symptoms of aspiration. Most Recent Instrumental: FEES complete 10/23/23  \"Oropharyngeal phase of swallowing characterized by slow and prolonged mastication with regular solids, but adequate A-P transit and oral clearance given time. Premature spillage with rapid spillover over epiglottic rim before the swallow with thin (cup and straw). Thus, flash penetration before the swallow that self-cleared. No aspiration across consistencies. No residue.      SLP recommends regular solids (encouraged to choose softer foods), thin Total Time / units   Cognitive Tx         Speech Tx      Dysphagia Tx  1258   1310 12 min/1 unit (DT)     Signature:  Kb Grewal S [facility]   Speech-Language Pathologist

## 2023-10-27 LAB
ANION GAP SERPL CALCULATED.3IONS-SCNC: 7 MMOL/L (ref 3–16)
BUN SERPL-MCNC: 22 MG/DL (ref 7–20)
CALCIUM SERPL-MCNC: 8.3 MG/DL (ref 8.3–10.6)
CHLORIDE SERPL-SCNC: 95 MMOL/L (ref 99–110)
CO2 SERPL-SCNC: 30 MMOL/L (ref 21–32)
CREAT SERPL-MCNC: 1 MG/DL (ref 0.6–1.2)
GFR SERPLBLD CREATININE-BSD FMLA CKD-EPI: 60 ML/MIN/{1.73_M2}
GLUCOSE BLD-MCNC: 179 MG/DL (ref 70–99)
GLUCOSE BLD-MCNC: 243 MG/DL (ref 70–99)
GLUCOSE BLD-MCNC: 309 MG/DL (ref 70–99)
GLUCOSE BLD-MCNC: 92 MG/DL (ref 70–99)
GLUCOSE BLD-MCNC: 95 MG/DL (ref 70–99)
GLUCOSE SERPL-MCNC: 104 MG/DL (ref 70–99)
MAGNESIUM SERPL-MCNC: 1.7 MG/DL (ref 1.8–2.4)
PERFORMED ON: ABNORMAL
PERFORMED ON: NORMAL
PERFORMED ON: NORMAL
POTASSIUM SERPL-SCNC: 3.9 MMOL/L (ref 3.5–5.1)
SODIUM SERPL-SCNC: 132 MMOL/L (ref 136–145)

## 2023-10-27 PROCEDURE — 97110 THERAPEUTIC EXERCISES: CPT

## 2023-10-27 PROCEDURE — 94669 MECHANICAL CHEST WALL OSCILL: CPT

## 2023-10-27 PROCEDURE — 80048 BASIC METABOLIC PNL TOTAL CA: CPT

## 2023-10-27 PROCEDURE — 6360000002 HC RX W HCPCS: Performed by: INTERNAL MEDICINE

## 2023-10-27 PROCEDURE — 94640 AIRWAY INHALATION TREATMENT: CPT

## 2023-10-27 PROCEDURE — 6370000000 HC RX 637 (ALT 250 FOR IP): Performed by: INTERNAL MEDICINE

## 2023-10-27 PROCEDURE — 99232 SBSQ HOSP IP/OBS MODERATE 35: CPT | Performed by: NURSE PRACTITIONER

## 2023-10-27 PROCEDURE — 6370000000 HC RX 637 (ALT 250 FOR IP): Performed by: NURSE PRACTITIONER

## 2023-10-27 PROCEDURE — 2060000000 HC ICU INTERMEDIATE R&B

## 2023-10-27 PROCEDURE — 97530 THERAPEUTIC ACTIVITIES: CPT

## 2023-10-27 PROCEDURE — 2580000003 HC RX 258: Performed by: INTERNAL MEDICINE

## 2023-10-27 PROCEDURE — 83735 ASSAY OF MAGNESIUM: CPT

## 2023-10-27 RX ORDER — TORSEMIDE 20 MG/1
20 TABLET ORAL ONCE
Status: COMPLETED | OUTPATIENT
Start: 2023-10-27 | End: 2023-10-27

## 2023-10-27 RX ORDER — TORSEMIDE 20 MG/1
40 TABLET ORAL DAILY
Status: DISCONTINUED | OUTPATIENT
Start: 2023-10-28 | End: 2023-10-28 | Stop reason: HOSPADM

## 2023-10-27 RX ORDER — LANOLIN ALCOHOL/MO/W.PET/CERES
400 CREAM (GRAM) TOPICAL DAILY
Status: DISCONTINUED | OUTPATIENT
Start: 2023-10-27 | End: 2023-10-28 | Stop reason: HOSPADM

## 2023-10-27 RX ADMIN — SACUBITRIL AND VALSARTAN 0.5 TABLET: 24; 26 TABLET, FILM COATED ORAL at 22:05

## 2023-10-27 RX ADMIN — CLOPIDOGREL BISULFATE 75 MG: 75 TABLET ORAL at 08:46

## 2023-10-27 RX ADMIN — MORPHINE SULFATE 15 MG: 15 TABLET, FILM COATED, EXTENDED RELEASE ORAL at 22:06

## 2023-10-27 RX ADMIN — GUAIFENESIN 600 MG: 600 TABLET ORAL at 08:46

## 2023-10-27 RX ADMIN — SACUBITRIL AND VALSARTAN 0.5 TABLET: 24; 26 TABLET, FILM COATED ORAL at 08:46

## 2023-10-27 RX ADMIN — Medication 400 MG: at 10:21

## 2023-10-27 RX ADMIN — Medication 10 ML: at 22:11

## 2023-10-27 RX ADMIN — BUDESONIDE 500 MCG: 0.5 SUSPENSION RESPIRATORY (INHALATION) at 19:36

## 2023-10-27 RX ADMIN — INSULIN LISPRO 12 UNITS: 100 INJECTION, SOLUTION INTRAVENOUS; SUBCUTANEOUS at 13:10

## 2023-10-27 RX ADMIN — PREDNISONE 4 MG: 1 TABLET ORAL at 08:53

## 2023-10-27 RX ADMIN — ENOXAPARIN SODIUM 40 MG: 100 INJECTION SUBCUTANEOUS at 08:45

## 2023-10-27 RX ADMIN — DULOXETINE HYDROCHLORIDE 60 MG: 60 CAPSULE, DELAYED RELEASE ORAL at 22:05

## 2023-10-27 RX ADMIN — INSULIN GLARGINE 10 UNITS: 100 INJECTION, SOLUTION SUBCUTANEOUS at 08:52

## 2023-10-27 RX ADMIN — BUDESONIDE 500 MCG: 0.5 SUSPENSION RESPIRATORY (INHALATION) at 07:41

## 2023-10-27 RX ADMIN — GABAPENTIN 300 MG: 300 CAPSULE ORAL at 22:05

## 2023-10-27 RX ADMIN — METOPROLOL SUCCINATE 25 MG: 25 TABLET, EXTENDED RELEASE ORAL at 08:46

## 2023-10-27 RX ADMIN — POTASSIUM CHLORIDE 20 MEQ: 1500 TABLET, EXTENDED RELEASE ORAL at 08:46

## 2023-10-27 RX ADMIN — ASPIRIN 81 MG: 81 TABLET, CHEWABLE ORAL at 08:47

## 2023-10-27 RX ADMIN — TORSEMIDE 20 MG: 20 TABLET ORAL at 10:09

## 2023-10-27 RX ADMIN — INSULIN GLARGINE 10 UNITS: 100 INJECTION, SOLUTION SUBCUTANEOUS at 22:04

## 2023-10-27 RX ADMIN — GUAIFENESIN 600 MG: 600 TABLET ORAL at 22:05

## 2023-10-27 RX ADMIN — ATORVASTATIN CALCIUM 40 MG: 40 TABLET, FILM COATED ORAL at 08:47

## 2023-10-27 RX ADMIN — GABAPENTIN 300 MG: 300 CAPSULE ORAL at 08:46

## 2023-10-27 RX ADMIN — DULOXETINE HYDROCHLORIDE 60 MG: 60 CAPSULE, DELAYED RELEASE ORAL at 08:48

## 2023-10-27 RX ADMIN — CETIRIZINE HYDROCHLORIDE 10 MG: 10 TABLET, FILM COATED ORAL at 08:47

## 2023-10-27 RX ADMIN — SPIRONOLACTONE 25 MG: 25 TABLET ORAL at 08:46

## 2023-10-27 RX ADMIN — MORPHINE SULFATE 15 MG: 15 TABLET, FILM COATED, EXTENDED RELEASE ORAL at 08:46

## 2023-10-27 RX ADMIN — ROFLUMILAST 500 MCG: 500 TABLET ORAL at 08:53

## 2023-10-27 RX ADMIN — TORSEMIDE 20 MG: 20 TABLET ORAL at 08:53

## 2023-10-27 RX ADMIN — CALCIUM CARBONATE 500 MG: 500 TABLET, CHEWABLE ORAL at 14:54

## 2023-10-27 RX ADMIN — TIOTROPIUM BROMIDE AND OLODATEROL 2 PUFF: 3.124; 2.736 SPRAY, METERED RESPIRATORY (INHALATION) at 07:41

## 2023-10-27 RX ADMIN — PANTOPRAZOLE SODIUM 40 MG: 40 TABLET, DELAYED RELEASE ORAL at 06:57

## 2023-10-27 ASSESSMENT — PAIN SCALES - GENERAL
PAINLEVEL_OUTOF10: 5
PAINLEVEL_OUTOF10: 9
PAINLEVEL_OUTOF10: 5
PAINLEVEL_OUTOF10: 8
PAINLEVEL_OUTOF10: 8
PAINLEVEL_OUTOF10: 0
PAINLEVEL_OUTOF10: 5

## 2023-10-27 ASSESSMENT — PAIN SCALES - WONG BAKER
WONGBAKER_NUMERICALRESPONSE: 0

## 2023-10-27 ASSESSMENT — PAIN DESCRIPTION - DESCRIPTORS
DESCRIPTORS: DISCOMFORT;ACHING
DESCRIPTORS: ACHING;DISCOMFORT
DESCRIPTORS: DISCOMFORT;ACHING

## 2023-10-27 ASSESSMENT — PAIN DESCRIPTION - PAIN TYPE
TYPE: CHRONIC PAIN
TYPE: CHRONIC PAIN

## 2023-10-27 ASSESSMENT — PAIN - FUNCTIONAL ASSESSMENT: PAIN_FUNCTIONAL_ASSESSMENT: PREVENTS OR INTERFERES SOME ACTIVE ACTIVITIES AND ADLS

## 2023-10-27 ASSESSMENT — PAIN DESCRIPTION - ORIENTATION
ORIENTATION: MID
ORIENTATION: MID
ORIENTATION: LOWER

## 2023-10-27 ASSESSMENT — PAIN DESCRIPTION - LOCATION
LOCATION: BACK

## 2023-10-27 NOTE — PLAN OF CARE
CHF Care Plan      Patient's EF (Ejection Fraction) is less than 40%    Heart Failure Medications:  Diuretics[de-identified] Spironolactone    (One of the following REQUIRED for EF </= 40%/SYSTOLIC FAILURE but MAY be used in EF% >40%/DIASTOLIC FAILURE)        ACE[de-identified] None        ARB[de-identified] None         ARNI[de-identified] Sacubitril/Valsartan-Entresto    (Beta Blockers)  NON- Evidenced Based Beta Blocker (for EF% >40%/DIASTOLIC FAILURE): None    Evidenced Based Beta Blocker::(REQUIRED for EF% <40%/SYSTOLIC FAILURE) Metoprolol SUCCinate- Toprol XL  . .................................................................................................................................................. Healthy Weight Tracking - BMI + Meds 10/22/2023 10/22/2023 10/23/2023 10/24/2023 10/25/2023 10/26/2023 10/27/2023   Weight 179 lb 10.8 oz 189 lb 2.5 oz 189 lb 2.5 oz 189 lb 6 oz 194 lb 8 oz 194 lb 10.7 oz 196 lb   Height - - - - - - -   Body Mass Index 28.13 kg/m2 29.62 kg/m2 29.62 kg/m2 29.65 kg/m2 30.46 kg/m2 30.48 kg/m2 30.69 kg/m2   Some recent data might be hidden         Patient's weights and intake/output reviewed: Yes    Daily Weight log at bedside, patient/family participation in use of log: \"yes    Patient's Last Weight: 196 lbs obtained by standing scale. Difference of 0.5 lbs more than last documented weight.       Intake/Output Summary (Last 24 hours) at 10/27/2023 1255  Last data filed at 10/27/2023 1251  Gross per 24 hour   Intake 1320 ml   Output 4450 ml   Net -3130 ml       Education Booklet Provided: yes    Comorbidities Reviewed Yes    Patient has a past medical history of Acute on chronic diastolic heart failure due to coronary artery disease ([address]), Acute respiratory failure with hypoxia ([address]), Atherosclerosis of native artery of right lower extremity with rest pain ([address]), Back pain, Branch retinal vein occlusion, Bronchiectasis with acute exacerbation ([address]), Candidal dermatitis, Cellulitis and abscess of left leg, Cellulitis of left management, be more comfortable, and reduce lower extremity edema prior to discharge        :

## 2023-10-27 NOTE — PROGRESS NOTES
Occupational Therapy  Facility/Department: [address] [place] [address] A2 CARD TELEMETRY  Treatment Note    Name: Federico Cheney  : 1950  MRN: [id number]  Date of Service: 10/27/2023    Discharge Recommendations:  24 hour supervision or assist, Home with Home health OT  OT Equipment Recommendations  Equipment Needed: Yes  Mobility Devices: ADL Assistive Devices  ADL Assistive Devices: Toileting - 3-in-1 Commode       Patient Diagnosis(es): The encounter diagnosis was Ventricular tachyarrhythmia ([address]).   Past Medical History:  has a past medical history of Acute on chronic diastolic heart failure due to coronary artery disease ([address]), Acute respiratory failure with hypoxia ([address]), Atherosclerosis of native artery of right lower extremity with rest pain ([address]), Back pain, Branch retinal vein occlusion, Bronchiectasis with acute exacerbation ([facility]), Candidal dermatitis, Cellulitis and abscess of left leg, Cellulitis of left lower extremity, CHF (congestive heart failure) ([facility]), Closed compression fracture of thoracic vertebra ([facility]), Closed fracture of facial bone with routine healing, Closed jaw fracture ([address]), Community acquired pneumonia of left lower lobe of lung, Compression fracture of L1 lumbar vertebra ([facility]), COPD (chronic obstructive pulmonary disease) ([address]), COPD exacerbation ([address]), COVID, Diabetes mellitus ([address]), Fracture of tibial plateau, closed, left, initial encounter, HCAP (healthcare-associated pneumonia), Minimally displaced zone I fracture of sacrum ([address]), MRSA (methicillin resistant staph aureus) culture positive, MRSA (methicillin resistant Staphylococcus aureus), Mucus plugging of bronchi, NSTEMI (non-ST elevated myocardial infarction) ([address]), Osteomyelitis of mandible, Osteoporosis with pathological fracture, Pneumonia of left lower lobe due to infectious organism, Post herpetic neuralgia, Pressure ulcer of left heel, stage 3 ([facility]), Proximal humerus fracture, Rheumatoid arthritis ([address]), Shingles, Sleep apnea, Status post incision and drainage, Surgical wound dehiscence, initial encounter (at [facility] SVG harvest site), Temporal arteritis ([address]), Tobacco use, Tracheomalacia, and Vitreous hemorrhage, right eye ([address]). Past Surgical History:  has a past surgical history that includes hernia repair; Mandible fracture surgery; Foot surgery; Elbow surgery; Cataract removal; Tubal ligation; Knee arthroscopy; Kyphosis surgery; laminectomy; Spinal fusion; Septoplasty (05/07/2013); Artery surgery (05/30/2013); Upper gastrointestinal endoscopy (04/08/2014); bronchoscopy; bronchoscopy (N/A, 06/12/2019); Mandible fracture surgery (02/2020); back surgery (08/2020); other surgical history (01/08/2021); Pressure ulcer debridement (N/A, 01/08/2021); Artery Biopsy (Right, 03/01/2021); Coronary artery bypass graft (N/A, 05/03/2021); Mediastinoscopy (N/A, 05/05/2021); Cardiac surgery (05/03/2021); Leg Surgery (Left, 7/13/2021); IR MIDLINE CATH (7/14/2021); transesophageal echocardiogram (11/02/2021); Colonoscopy; bronchoscopy (N/A, 10/13/2023); and bronchoscopy (10/13/2023). Assessment   Performance deficits / Impairments: Decreased functional mobility ; Decreased ADL status; Decreased strength;Decreased endurance;Decreased balance;Decreased posture  Assessment: Pt was admitted to [facility] for NSTEMI. PTA, pt reports requiring assistance for some ADLs and IADLs (showering, laundry, cleaning), but is IND with functional mobility/transfers with w/c or electric scooter. pt min assist fo sit-->stand transfer from recliner chair & CGA for stand-pivot transfer to [facility] & on/off BSC & standing to manage briefs for toileting; Pt is funcitoning below her baseline secondary to the deficits listed above and will benefit from continued skilled acute OT services to maximize safety and IND with ADL tasks and mobility. Home with 24/7 SPV and Diamond Andrew is recommended at d/c.  Home with 24/7 SPV and Diamond Andrew is recommended at d/c.  REQUIRES OT FOLLOW-UP: Yes  Activity Tolerance  Activity Tolerance: Patient Tolerated treatment well        Plan   Occupational Therapy Plan  Times Per Week: 3-5x  Current Treatment Recommendations: Strengthening, Balance training, Functional mobility training, Endurance training, Safety education & training, Equipment evaluation, education, & procurement, Self-Care / ADL     Restrictions  Restrictions/Precautions  Restrictions/Precautions: Fall Risk, General Precautions  Required Braces or Orthoses?: No  Position Activity Restriction  Other position/activity restrictions: LIFE Vest    Subjective   General  Chart Reviewed: Yes  Patient assessed for rehabilitation services?: Yes  Family / Caregiver Present: Yes (daughter)  Referring Practitioner: Marcial Man MD  Diagnosis: NSTEMI  Subjective  Subjective: Pt reclined in bed upon OT arrival with family present, agreeable to eval and treatment. General Comment  Comments: RN cleared pt for OT; pt sleeping in chair, agreeable to therapy & requesting to use BSC       Objective   Pulse: 74  Heart Rate Source: Monitor  BP: (!) 94/43  BP Location: Right upper arm  BP Method: Automatic  Patient Position: Sitting  MAP (Calculated): 60  Respirations: 16  SpO2: 95 %  O2 Device: None (Room air)             Safety Devices  Type of Devices: All omid prominences offloaded;Call light within reach; Left in chair;Nurse notified; Heels elevated for pressure relief; Chair alarm in place     Toilet Transfers  Toilet - Technique: Stand pivot  Equipment Used: Standard bedside commode  Toilet Transfer: Contact guard assistance     ADL  Grooming: Setup (seated to wash hands after toileting hygiene)  LE Dressing: Contact guard assistance (standing to manage briefs off/on hips for toileting on BSC)  Toileting: Supervision        Bed mobility  Supine to Sit: Unable to assess (already up in chair)  Sit to Supine: Unable to assess (Left up in chair upon exiting; pt's preference)  Transfers  Sit to stand: Minimal Co-treatment   Time In 1610         Time Out [facility]         Minutes [address] [facility] [place]

## 2023-10-27 NOTE — PLAN OF CARE
Problem: Chronic Conditions and Co-morbidities  Goal: Patient's chronic conditions and co-morbidity symptoms are monitored and maintained or improved  Outcome: Progressing   CHF Care Plan      Patient's EF (Ejection Fraction) is greater than 40%    Heart Failure Medications:  Diuretics[de-identified] Spironolactone    (One of the following REQUIRED for EF </= 40%/SYSTOLIC FAILURE but MAY be used in EF% >40%/DIASTOLIC FAILURE)        ACE[de-identified] None        ARB[de-identified] None         ARNI[de-identified] Sacubitril/Valsartan-Entresto    (Beta Blockers)  NON- Evidenced Based Beta Blocker (for EF% >40%/DIASTOLIC FAILURE): None    Evidenced Based Beta Blocker::(REQUIRED for EF% <40%/SYSTOLIC FAILURE) Metoprolol SUCCinate- Toprol XL  . .................................................................................................................................................. Healthy Weight Tracking - BMI + Meds 10/22/2023 10/22/2023 10/23/2023 10/24/2023 10/25/2023 10/26/2023 10/27/2023   Weight 179 lb 10.8 oz 189 lb 2.5 oz 189 lb 2.5 oz 189 lb 6 oz 194 lb 8 oz 194 lb 10.7 oz 196 lb   Height - - - - - - -   Body Mass Index 28.13 kg/m2 29.62 kg/m2 29.62 kg/m2 29.65 kg/m2 30.46 kg/m2 30.48 kg/m2 30.69 kg/m2   Some recent data might be hidden         Patient's weights and intake/output reviewed: Yes    Daily Weight log at bedside, patient/family participation in use of log: \"yes    Patient's Last Weight: 196 lbs obtained by standing scale. Difference of 2 lbs more than last documented weight.       Intake/Output Summary (Last 24 hours) at 10/27/2023 0622  Last data filed at 10/27/2023 0000  Gross per 24 hour   Intake 1660 ml   Output 2250 ml   Net -590 ml       Education Booklet Provided: yes    Comorbidities Reviewed Yes    Patient has a past medical history of Acute on chronic diastolic heart failure due to coronary artery disease ([address]), Acute respiratory failure with hypoxia ([address]), Atherosclerosis of native artery of right lower extremity with rest pain ([address]), Back pain, Branch retinal vein occlusion, Bronchiectasis with acute exacerbation ([facility]), Candidal dermatitis, Cellulitis and abscess of left leg, Cellulitis of left lower extremity, CHF (congestive heart failure) ([facility]), Closed compression fracture of thoracic vertebra ([facility]), Closed fracture of facial bone with routine healing, Closed jaw fracture ([address]), Community acquired pneumonia of left lower lobe of lung, Compression fracture of L1 lumbar vertebra ([facility]), COPD (chronic obstructive pulmonary disease) ([address]), COPD exacerbation ([address]), COVID, Diabetes mellitus ([address]), Fracture of tibial plateau, closed, left, initial encounter, HCAP (healthcare-associated pneumonia), Minimally displaced zone I fracture of sacrum ([address]), MRSA (methicillin resistant staph aureus) culture positive, MRSA (methicillin resistant Staphylococcus aureus), Mucus plugging of bronchi, NSTEMI (non-ST elevated myocardial infarction) ([address]), Osteomyelitis of mandible, Osteoporosis with pathological fracture, Pneumonia of left lower lobe due to infectious organism, Post herpetic neuralgia, Pressure ulcer of left heel, stage 3 ([facility]), Proximal humerus fracture, Rheumatoid arthritis ([address]), Shingles, Sleep apnea, Status post incision and drainage, Surgical wound dehiscence, initial encounter (at [facility] SVG harvest site), Temporal arteritis ([address]), Tobacco use, Tracheomalacia, and Vitreous hemorrhage, right eye ([address]). >>For CHF and Comorbidity documentation on Education Time and Topics, please see Education Tab    Progressive Mobility Assessment:  What is this patient's Current Level of Mobility?: Ambulatory- with Assistance  How was this patient Mobilized today?: Edge of Bed, Up to Chair, Bedside Commode,  Up to Toilet/Shower, Up in Room, and Up in Hallway, ambulated 10 ft                 With Whom? Nurse, PCA, PT, and OT                 Level of Difficulty/Assistance: 1x Assist     Pt up in chair at this time on room air.  Pt denies shortness of

## 2023-10-27 NOTE — PROGRESS NOTES
Hospital Medicine Progress Note      Date of Admission: 10/19/2023  Hospital Day: 9    Chief Admission Complaint:  SOB     Subjective:  Minimal SOB. Edema has improved. BS stable. Presenting Admission History:     Isha Rdz is a 68 y.o. female with pmh of hypertension, CAD (CABG), BIMAL clip, cardiomyopathy (EF has improved from 30% to 50%), COPD/bronchiectasis, tracheomalacia, obstructive sleep apnea, diabetes type 2, rheumatoid arthritis (on prednisone 4 mg daily), CKD 3 and chronic nonhealing stage IV pressure ulcer admitted. Recently admitted at [facility]. Found to have Nonhealing stage IV pressure ulcer-MRI consistent with osteomyelitis. ID consulted-continue wound care, no need for antibiotics for chronic wound. Treated for Hypotension with pressors and IV hydrocortisone; thought to be 2/2 adrenal crisis. Treated for Bronchiectasis. Treated for NSTEMI, started on Heparin drip and transferred to [facility]. Upon arrival to [facility] she suffered a V-fib cardiac arrest.  ROSC attained in less than 2 minutes. Assessment/Plan:      Current Principal Problem:  NSTEMI (non-ST elevated myocardial infarction) ([address])    V-fib cardiac arrest: Status post amiodarone drip. Suspect 2/2 NSTEMI/Ischemic Cardiomyopathy. Neurologically intact. Life Vest obtained. NSTEMI:  s/p  heparin drip. Status post nitroglycerin drip. Cardiac catheterization on 10/20-occluded SVG to OM. Medical management recommended. On metoprolol, aspirin, Plavix and Lipitor. Ischemic Cardiomyopathy: Echo 10/19-EF 30% with regional wall motion abnormality. IV diuresis completed. Changed to Torsemide along with Aldactone. Dose of Torsemide increased. Continue Entresto, Metoprolol. Life Vest in place. Hypotension: Status post pressors. This is due to adrenal crisis (patient on prednisone 4 mg daily at home). S/p IV -hydrocortisone. Switched back to po prednisone.   Resolved      Chronic sacral nonhealing stage IV pressure ulcer/chronic NURSE/COORDINATOR  IP CONSULT TO DIETITIAN      This patient has a high likelihood of being discharged tomorrow and is appropriate for A1 Discharge Unit in AM pending clinical course overnight: []Yes  [x]No    ------------------------------------------------------------------------------------------------------------------------------------------------------------------------    MDM    Moderate    Medications:  Personally reviewed in detail in conjunction w/ labs as documented for evidence of drug toxicity. Infusion Medications    sodium chloride 5 mL/hr at 10/21/23 0706    dextrose       Scheduled Medications    [START ON 10/28/2023] torsemide  40 mg Oral Daily    magnesium oxide  400 mg Oral Daily    insulin glargine  10 Units SubCUTAneous BID    spironolactone  25 mg Oral Daily    potassium chloride  20 mEq Oral Daily    predniSONE  4 mg Oral Daily    metoprolol succinate  25 mg Oral Daily    enoxaparin  40 mg SubCUTAneous Daily    guaiFENesin  600 mg Oral BID    tiotropium-olodaterol  2 puff Inhalation Daily    wound/burn dressing   Topical Daily    sodium chloride flush  5-40 mL IntraVENous 2 times per day    atorvastatin  40 mg Oral Daily    cetirizine  10 mg Oral Daily    clopidogrel  75 mg Oral Daily    DULoxetine  60 mg Oral BID    gabapentin  300 mg Oral BID    morphine  15 mg Oral 2 times per day    pantoprazole  40 mg Oral QAM AC    Roflumilast  500 mcg Oral Daily    sacubitril-valsartan  0.5 tablet Oral BID    aspirin  81 mg Oral Daily    budesonide  0.5 mg Nebulization BID RT    insulin lispro  0-16 Units SubCUTAneous Q4H     PRN Meds: albuterol, sodium chloride flush, sodium chloride, acetaminophen, benzonatate, calcium carbonate, cyclobenzaprine, dextrose, dextrose bolus **OR** dextrose bolus, docusate sodium, glucagon (rDNA), glucose, ondansetron **OR** ondansetron, polyethylene glycol, oxyCODONE, nitroGLYCERIN, morphine     Labs:  Personally reviewed and interpreted for clinical significance.

## 2023-10-27 NOTE — PROGRESS NOTES
CHF Care Plan      Patient's EF (Ejection Fraction) is less than 40%    Heart Failure Medications:  Diuretics[de-identified] Spironolactone    (One of the following REQUIRED for EF </= 40%/SYSTOLIC FAILURE but MAY be used in EF% >40%/DIASTOLIC FAILURE)        ACE[de-identified] None        ARB[de-identified] None         ARNI[de-identified] Sacubitril/Valsartan-Entresto    (Beta Blockers)  NON- Evidenced Based Beta Blocker (for EF% >40%/DIASTOLIC FAILURE): None    Evidenced Based Beta Blocker::(REQUIRED for EF% <40%/SYSTOLIC FAILURE) Metoprolol SUCCinate- Toprol XL  . .................................................................................................................................................. Healthy Weight Tracking - BMI + Meds 10/22/2023 10/22/2023 10/23/2023 10/24/2023 10/25/2023 10/26/2023 10/27/2023   Weight 179 lb 10.8 oz 189 lb 2.5 oz 189 lb 2.5 oz 189 lb 6 oz 194 lb 8 oz 194 lb 10.7 oz 196 lb   Height - - - - - - -   Body Mass Index 28.13 kg/m2 29.62 kg/m2 29.62 kg/m2 29.65 kg/m2 30.46 kg/m2 30.48 kg/m2 30.69 kg/m2   Some recent data might be hidden         Patient's weights and intake/output reviewed: Yes    Daily Weight log at bedside, patient/family participation in use of log: \"yes    Patient's Last Weight: 88.9 kg obtained by standing scale. Difference of 0.5 kg more than last documented weight.       Intake/Output Summary (Last 24 hours) at 10/27/2023 0942  Last data filed at 10/27/2023 5872  Gross per 24 hour   Intake 1540 ml   Output 3050 ml   Net -1510 ml       Education Booklet Provided: yes    Comorbidities Reviewed Yes    Patient has a past medical history of Acute on chronic diastolic heart failure due to coronary artery disease ([address]), Acute respiratory failure with hypoxia ([address]), Atherosclerosis of native artery of right lower extremity with rest pain ([address]), Back pain, Branch retinal vein occlusion, Bronchiectasis with acute exacerbation ([address]), Candidal dermatitis, Cellulitis and abscess of left leg, Cellulitis of left lower extremity, CHF (congestive heart failure) ([facility]), Closed compression fracture of thoracic vertebra (HCC), Closed fracture of facial bone with routine healing, Closed jaw fracture ([address]), Community acquired pneumonia of left lower lobe of lung, Compression fracture of L1 lumbar vertebra (HCC), COPD (chronic obstructive pulmonary disease) ([address]), COPD exacerbation ([address]), COVID, Diabetes mellitus ([address]), Fracture of tibial plateau, closed, left, initial encounter, HCAP (healthcare-associated pneumonia), Minimally displaced zone I fracture of sacrum ([address]), MRSA (methicillin resistant staph aureus) culture positive, MRSA (methicillin resistant Staphylococcus aureus), Mucus plugging of bronchi, NSTEMI (non-ST elevated myocardial infarction) ([address]), Osteomyelitis of mandible, Osteoporosis with pathological fracture, Pneumonia of left lower lobe due to infectious organism, Post herpetic neuralgia, Pressure ulcer of left heel, stage 3 ([address]), Proximal humerus fracture, Rheumatoid arthritis ([address]), Shingles, Sleep apnea, Status post incision and drainage, Surgical wound dehiscence, initial encounter (at [facility] harvest site), Temporal arteritis ([address]), Tobacco use, Tracheomalacia, and Vitreous hemorrhage, right eye ([address]). >>For CHF and Comorbidity documentation on Education Time and Topics, please see Education Tab    Progressive Mobility Assessment:  What is this patient's Current Level of Mobility?: Ambulatory- with Assistance  How was this patient Mobilized today?: Edge of Bed, Up to Chair, Bedside Commode,  Up to Toilet/Shower, Up in Room, Up in [place], Unable to Mobilize, and Patient Refuses to Mobilize, ambulated 5 ft                 With Whom? Nurse                 Level of Difficulty/Assistance: 2x Assist     Pt up in chair at this time on room air. Pt denies shortness of breath. Pt with pitting lower extremity edema.      Patient and/or Family's stated Goal of Care this Admission: increase activity tolerance, better

## 2023-10-28 VITALS
HEART RATE: 74 BPM | HEIGHT: 67 IN | RESPIRATION RATE: 18 BRPM | TEMPERATURE: 98.4 F | BODY MASS INDEX: 29.82 KG/M2 | WEIGHT: 190 LBS | DIASTOLIC BLOOD PRESSURE: 49 MMHG | OXYGEN SATURATION: 90 % | SYSTOLIC BLOOD PRESSURE: 106 MMHG

## 2023-10-28 LAB
ANION GAP SERPL CALCULATED.3IONS-SCNC: 8 MMOL/L (ref 3–16)
BUN SERPL-MCNC: 26 MG/DL (ref 7–20)
CALCIUM SERPL-MCNC: 9.1 MG/DL (ref 8.3–10.6)
CHLORIDE SERPL-SCNC: 95 MMOL/L (ref 99–110)
CO2 SERPL-SCNC: 31 MMOL/L (ref 21–32)
CREAT SERPL-MCNC: 1.2 MG/DL (ref 0.6–1.2)
GFR SERPLBLD CREATININE-BSD FMLA CKD-EPI: 48 ML/MIN/{1.73_M2}
GLUCOSE BLD-MCNC: 169 MG/DL (ref 70–99)
GLUCOSE BLD-MCNC: 181 MG/DL (ref 70–99)
GLUCOSE BLD-MCNC: 338 MG/DL (ref 70–99)
GLUCOSE SERPL-MCNC: 161 MG/DL (ref 70–99)
MAGNESIUM SERPL-MCNC: 1.9 MG/DL (ref 1.8–2.4)
PERFORMED ON: ABNORMAL
POTASSIUM SERPL-SCNC: 4 MMOL/L (ref 3.5–5.1)
SODIUM SERPL-SCNC: 134 MMOL/L (ref 136–145)

## 2023-10-28 PROCEDURE — 6370000000 HC RX 637 (ALT 250 FOR IP): Performed by: INTERNAL MEDICINE

## 2023-10-28 PROCEDURE — 94640 AIRWAY INHALATION TREATMENT: CPT

## 2023-10-28 PROCEDURE — 83735 ASSAY OF MAGNESIUM: CPT

## 2023-10-28 PROCEDURE — 6370000000 HC RX 637 (ALT 250 FOR IP): Performed by: NURSE PRACTITIONER

## 2023-10-28 PROCEDURE — 94669 MECHANICAL CHEST WALL OSCILL: CPT

## 2023-10-28 PROCEDURE — 6360000002 HC RX W HCPCS: Performed by: INTERNAL MEDICINE

## 2023-10-28 PROCEDURE — 99232 SBSQ HOSP IP/OBS MODERATE 35: CPT | Performed by: NURSE PRACTITIONER

## 2023-10-28 PROCEDURE — 2580000003 HC RX 258: Performed by: INTERNAL MEDICINE

## 2023-10-28 PROCEDURE — 80048 BASIC METABOLIC PNL TOTAL CA: CPT

## 2023-10-28 RX ORDER — SPIRONOLACTONE 25 MG/1
25 TABLET ORAL DAILY
Qty: 30 TABLET | Refills: 3 | Status: ON HOLD | OUTPATIENT
Start: 2023-10-28 | End: 2023-11-09 | Stop reason: HOSPADM

## 2023-10-28 RX ORDER — ASPIRIN 81 MG/1
81 TABLET, CHEWABLE ORAL DAILY
Qty: 30 TABLET | Refills: 3 | Status: SHIPPED | OUTPATIENT
Start: 2023-10-28 | End: 2023-11-30 | Stop reason: SDUPTHER

## 2023-10-28 RX ORDER — POTASSIUM CHLORIDE 20 MEQ/1
20 TABLET, EXTENDED RELEASE ORAL DAILY
Qty: 3 TABLET | Refills: 0 | Status: ON HOLD | OUTPATIENT
Start: 2023-10-29 | End: 2023-11-09 | Stop reason: HOSPADM

## 2023-10-28 RX ORDER — NITROGLYCERIN 0.4 MG/1
0.4 TABLET SUBLINGUAL EVERY 5 MIN PRN
Qty: 25 TABLET | Refills: 3 | Status: SHIPPED | OUTPATIENT
Start: 2023-10-28

## 2023-10-28 RX ORDER — METOPROLOL SUCCINATE 25 MG/1
25 TABLET, EXTENDED RELEASE ORAL DAILY
Qty: 30 TABLET | Refills: 3 | Status: SHIPPED | OUTPATIENT
Start: 2023-10-28

## 2023-10-28 RX ORDER — LANOLIN ALCOHOL/MO/W.PET/CERES
400 CREAM (GRAM) TOPICAL DAILY
Qty: 30 TABLET | Refills: 3 | Status: SHIPPED | OUTPATIENT
Start: 2023-10-28 | End: 2023-11-30 | Stop reason: SDUPTHER

## 2023-10-28 RX ADMIN — POTASSIUM CHLORIDE 20 MEQ: 1500 TABLET, EXTENDED RELEASE ORAL at 09:15

## 2023-10-28 RX ADMIN — Medication 10 ML: at 09:17

## 2023-10-28 RX ADMIN — CLOPIDOGREL BISULFATE 75 MG: 75 TABLET ORAL at 09:15

## 2023-10-28 RX ADMIN — BUDESONIDE 500 MCG: 0.5 SUSPENSION RESPIRATORY (INHALATION) at 08:04

## 2023-10-28 RX ADMIN — TORSEMIDE 40 MG: 20 TABLET ORAL at 09:15

## 2023-10-28 RX ADMIN — SACUBITRIL AND VALSARTAN 0.5 TABLET: 24; 26 TABLET, FILM COATED ORAL at 09:15

## 2023-10-28 RX ADMIN — Medication: at 00:36

## 2023-10-28 RX ADMIN — PREDNISONE 4 MG: 1 TABLET ORAL at 09:16

## 2023-10-28 RX ADMIN — METOPROLOL SUCCINATE 25 MG: 25 TABLET, EXTENDED RELEASE ORAL at 09:15

## 2023-10-28 RX ADMIN — GUAIFENESIN 600 MG: 600 TABLET ORAL at 09:15

## 2023-10-28 RX ADMIN — SPIRONOLACTONE 25 MG: 25 TABLET ORAL at 09:16

## 2023-10-28 RX ADMIN — INSULIN GLARGINE 10 UNITS: 100 INJECTION, SOLUTION SUBCUTANEOUS at 09:16

## 2023-10-28 RX ADMIN — INSULIN LISPRO 4 UNITS: 100 INJECTION, SOLUTION INTRAVENOUS; SUBCUTANEOUS at 00:35

## 2023-10-28 RX ADMIN — CETIRIZINE HYDROCHLORIDE 10 MG: 10 TABLET, FILM COATED ORAL at 09:15

## 2023-10-28 RX ADMIN — ROFLUMILAST 500 MCG: 500 TABLET ORAL at 09:16

## 2023-10-28 RX ADMIN — ENOXAPARIN SODIUM 40 MG: 100 INJECTION SUBCUTANEOUS at 09:16

## 2023-10-28 RX ADMIN — ATORVASTATIN CALCIUM 40 MG: 40 TABLET, FILM COATED ORAL at 09:15

## 2023-10-28 RX ADMIN — TIOTROPIUM BROMIDE AND OLODATEROL 2 PUFF: 3.124; 2.736 SPRAY, METERED RESPIRATORY (INHALATION) at 08:04

## 2023-10-28 RX ADMIN — Medication 400 MG: at 09:15

## 2023-10-28 RX ADMIN — MORPHINE SULFATE 15 MG: 15 TABLET, FILM COATED, EXTENDED RELEASE ORAL at 09:15

## 2023-10-28 RX ADMIN — PANTOPRAZOLE SODIUM 40 MG: 40 TABLET, DELAYED RELEASE ORAL at 05:28

## 2023-10-28 RX ADMIN — GABAPENTIN 300 MG: 300 CAPSULE ORAL at 09:15

## 2023-10-28 RX ADMIN — ASPIRIN 81 MG: 81 TABLET, CHEWABLE ORAL at 09:16

## 2023-10-28 RX ADMIN — DULOXETINE HYDROCHLORIDE 60 MG: 60 CAPSULE, DELAYED RELEASE ORAL at 09:16

## 2023-10-28 NOTE — PLAN OF CARE
CHF Care Plan      Patient's EF (Ejection Fraction) is less than 40%    Heart Failure Medications:  Diuretics[de-identified] Torsemide and Spironolactone    (One of the following REQUIRED for EF </= 40%/SYSTOLIC FAILURE but MAY be used in EF% >40%/DIASTOLIC FAILURE)        ACE[de-identified] None        ARB[de-identified] None         ARNI[de-identified] Sacubitril/Valsartan-Entresto    (Beta Blockers)  NON- Evidenced Based Beta Blocker (for EF% >40%/DIASTOLIC FAILURE): None    Evidenced Based Beta Blocker::(REQUIRED for EF% <40%/SYSTOLIC FAILURE) Metoprolol SUCCinate- Toprol XL  . .................................................................................................................................................. Healthy Weight Tracking - BMI + Meds 10/22/2023 10/23/2023 10/24/2023 10/25/2023 10/26/2023 10/27/2023 10/28/2023   Weight 189 lb 2.5 oz 189 lb 2.5 oz 189 lb 6 oz 194 lb 8 oz 194 lb 10.7 oz 196 lb 190 lb   Height - - - - - - -   Body Mass Index 29.62 kg/m2 29.62 kg/m2 29.65 kg/m2 30.46 kg/m2 30.48 kg/m2 30.69 kg/m2 29.75 kg/m2   Some recent data might be hidden         Patient's weights and intake/output reviewed: Yes    Daily Weight log at bedside, patient/family participation in use of log: \"yes    Patient's Last Weight: 190 lbs obtained by standing scale. Difference of 6 lbs less than last documented weight.       Intake/Output Summary (Last 24 hours) at 10/28/2023 1000  Last data filed at 10/28/2023 0056  Gross per 24 hour   Intake 960 ml   Output 3000 ml   Net -2040 ml       Education Booklet Provided: yes    Comorbidities Reviewed Yes    Patient has a past medical history of Acute on chronic diastolic heart failure due to coronary artery disease ([address]), Acute respiratory failure with hypoxia ([address]), Atherosclerosis of native artery of right lower extremity with rest pain ([address]), Back pain, Branch retinal vein occlusion, Bronchiectasis with acute exacerbation ([address]), Candidal dermatitis, Cellulitis and abscess of left leg, Cellulitis of left discharge        :

## 2023-10-28 NOTE — DISCHARGE SUMMARY
Hospital Medicine Discharge Summary    Patient: aLy Juarez   : 1950     Admit Date: 10/19/2023   Discharge Date:  10/28/2023  Disposition:  []Home   [x]HHC  []SNF  []ECF  []Acute Rehab  []LTAC  []Hospice  Code status:  [x]Full  []DNR/CCA  []Limited (DNR/CCA with Do Not Intubate)  []DNRCC  Condition at Discharge: Stable  Primary Care Provider: April Saldana MD    Admitting Provider: Mell Allen MD  Discharge Provider: LORENZO Gutiérrez - CNP     Discharge Diagnoses: Active Hospital Problems    Diagnosis     Acute kidney injury superimposed on CKD ([address]) [N17.9, N18.9]      Priority: Medium    Ventricular tachyarrhythmia ([address]) [I47.20]     Ventricular fibrillation (HCC) [I49.01]     NSTEMI (non-ST elevated myocardial infarction) (HCC) [I21.4]     Ischemic cardiomyopathy [I25.5]     Acute metabolic encephalopathy [C91.98]     Acute adrenal crisis ([address]) [E27.2]     Acute on chronic HFrEF (heart failure with reduced ejection fraction) (HCC) [I50.23]     Aortocoronary bypass status [Z95.1]     Pressure ulcer of coccygeal region, stage 4 ([address]) [L89.154]     Coronary artery disease [I25.10]     Type 2 diabetes mellitus with diabetic peripheral angiopathy without gangrene, with long-term current use of insulin ([address]) [E11.51, Z79.4]     Rheumatoid arthritis ([address]) [M06.9]        Presenting Admission History:      Lay Juarez is a 68 y.o. female with pmh of hypertension, CAD (CABG), BIMAL clip, cardiomyopathy (EF has improved from 30% to 50%), COPD/bronchiectasis, tracheomalacia, obstructive sleep apnea, diabetes type 2, rheumatoid arthritis (on prednisone 4 mg daily), CKD 3 and chronic nonhealing stage IV pressure ulcer admitted. Recently admitted at [facility]. Found to have Nonhealing stage IV pressure ulcer-MRI consistent with osteomyelitis. ID consulted-continue wound care, no need for antibiotics for chronic wound.  Treated for Hypotension with pressors and IV hydrocortisone; thought appears stated age and cooperative. Respiratory:  Normal respiratory effort. Cardiovascular:  Regular rate and rhythm. Abdomen:  Soft, non-tender, non-distended. Musculoskeletal:  No edema  Neurologic:  Non-focal  Psychiatric:  Alert and oriented    Patient Discharge Instructions: Follow up:    1. Primary Care Provider Shaquille Fulton MD in the next 1-2 weeks. 2. CardiologyALDA CNP, in 1 week    The patient was seen and examined on day of discharge and this discharge summary is in conjunction with any daily progress note from day of discharge. Time spent on discharge: 40 minutes in the examination, evaluation, counseling and review of medications and discharge plan.    ------------------------------------------------------------------------------------------------------------------------------------------------------    Discharge Medications:   Current Discharge Medication List        START taking these medications    Details   aspirin 81 MG chewable tablet Take 1 tablet by mouth daily  Qty: 30 tablet, Refills: 3      nitroGLYCERIN (NITROSTAT) 0.4 MG SL tablet Place 1 tablet under the tongue every 5 minutes as needed for Chest pain up to max of 3 total doses. If no relief after 1 dose, call 911.   Qty: 25 tablet, Refills: 3      magnesium oxide (MAG-OX) 400 (240 Mg) MG tablet Take 1 tablet by mouth daily  Qty: 30 tablet, Refills: 3      potassium chloride (KLOR-CON M) 20 MEQ extended release tablet Take 1 tablet by mouth daily for 3 days  Qty: 3 tablet, Refills: 0           Current Discharge Medication List        CONTINUE these medications which have CHANGED    Details   metoprolol succinate (TOPROL XL) 25 MG extended release tablet Take 1 tablet by mouth daily  Qty: 30 tablet, Refills: 3      spironolactone (ALDACTONE) 25 MG tablet Take 1 tablet by mouth daily  Qty: 30 tablet, Refills: 3           Current Discharge Medication List        CONTINUE these medications which have NOT CHANGED

## 2023-10-28 NOTE — PROGRESS NOTES
Discharge order noted. AUTUMN completed. AVS printed and reviewed with patient all questions answered. PICC line removed. Care plan and education completed. Tele box 60 removed. All belongings sent with patient including lifepak chargers/information, personal cell phone and , and other personal belongings. Meds picked up from pharmacy. Pt discharged home with daughter; home health care all set up.      Electronically signed by Anthony London RN on 10/28/2023 at 11:55 AM

## 2023-10-28 NOTE — PLAN OF CARE
Problem: Chronic Conditions and Co-morbidities  Goal: Patient's chronic conditions and co-morbidity symptoms are monitored and maintained or improved  10/28/2023 1012 by Kierra Brock RN  Outcome: Completed  10/28/2023 0959 by Kierra Brock RN  Outcome: Progressing  Flowsheets (Taken 10/27/2023 2015 by Rufus Bardales RN)  Care Plan - Patient's Chronic Conditions and Co-Morbidity Symptoms are Monitored and Maintained or Improved: Monitor and assess patient's chronic conditions and comorbid symptoms for stability, deterioration, or improvement     Problem: Discharge Planning  Goal: Discharge to home or other facility with appropriate resources  Outcome: Completed  Flowsheets (Taken 10/27/2023 2015 by Rufus Bardales RN)  Discharge to home or other facility with appropriate resources: Identify barriers to discharge with patient and caregiver     Problem: Respiratory - Adult  Goal: Achieves optimal ventilation and oxygenation  Outcome: Completed  Flowsheets (Taken 10/27/2023 2015 by Rufus Bardales RN)  Achieves optimal ventilation and oxygenation: Assess for changes in respiratory status     Problem: Cardiovascular - Adult  Goal: Maintains optimal cardiac output and hemodynamic stability  Outcome: Completed  Flowsheets (Taken 10/27/2023 2015 by Rufus Bardales RN)  Maintains optimal cardiac output and hemodynamic stability: Monitor blood pressure and heart rate  Goal: Absence of cardiac dysrhythmias or at baseline  Outcome: Completed  Flowsheets (Taken 10/27/2023 2015 by Rufus Bardales RN)  Absence of cardiac dysrhythmias or at baseline: Monitor cardiac rate and rhythm     Problem: Skin/Tissue Integrity - Adult  Goal: Incisions, wounds, or drain sites healing without S/S of infection  Outcome: Completed  Flowsheets (Taken 10/27/2023 2015 by Rufus Bardales RN)  Incisions, Wounds, or Drain Sites Healing Without Sign and Symptoms of Infection: ADMISSION and DAILY: Assess and document

## 2023-10-28 NOTE — PROGRESS NOTES
Patient prefers to be in the chair. Patient gets up with walker, contact guard assist to the bedside. A/O, call out appropriately, having chronic back pain, wound to sacrum cleaned/ointment applied, dressing, new brief. Patient is continent but urgency causes accidents. Blood sugars are high, visitors brought in candy. Patient educated on the need to monitor and limit the sugar and carbs with her diet to maintain blood sugar. No chest pain, patient oxygen was in 96-97% while awake. Dropped to 90-91 at night when sleeping. Patient has prn o2 and same order for home use. Placed patient on 2 liters to maintain stats throughout the night. Patient has good output, no bowel movement this shift. Call light within reach, bedside table and personal items within reach, chair alarm is on. No further needs at this time.

## 2023-10-28 NOTE — DISCHARGE INSTR - COC
Continuity of Care Form    Patient Name: Aida Vazquez   :  1950  MRN:  [id number]    Admit date:  10/19/2023  Discharge date:  10/28/2023    Code Status Order: Full Code   Advance Directives:     Admitting Physician:  Deepthi Verma MD  PCP: Dulce Maria Sheehan MD    [facility]/Room#: 0203/0203-01  Discharging Unit Phone Number: [phone]    Emergency Contact:   Extended Emergency Contact Information  Primary Emergency Contact: Damiankarrie [place] [address] [place] Phone: [phone]  Work Phone: [phone]  Mobile Phone: [phone]  Relation: Child   needed? No  Secondary Emergency Contact: Cary Gabriel [place] [address] [address] [place] Phone: [phone]  Work Phone: [phone]  Mobile Phone: [phone]  Relation: Child   needed?  No    Past Surgical History:  Past Surgical History:   Procedure Laterality Date    ARTERY BIOPSY Right 2021    at [address]  2013    left temporal artery biopsy    BACK SURGERY  2020    BRONCHOSCOPY      BRONCHOSCOPY N/A 2019    BRONCHOSCOPY ALVEOLAR LAVAGE performed by Govind Goodwin MD at [address] [place] [address] N/A 10/13/2023    BRONCHOSCOPY THERAPUTIC ASPIRATION INITIAL performed by Govind Goodwin MD at [address] [place] [address]  10/13/2023    BRONCHOSCOPY ALVEOLAR LAVAGE performed by Govind Goodwin MD at [address] [place] [address]  2021    CATARACT REMOVAL      COLONOSCOPY      CORONARY ARTERY BYPASS GRAFT N/A 2021    CORONARY ARTERY BYPASS X3 WITH LEFT ATRIAL APPENDAGE CLIP, 5 LEVEL BILATERAL INTERCOSTAL NERVE BLOCK, STERNAL PLATING performed by Yadira Esteban MD at [address] CATH  2021    IR MIDLINE CATH 2021 [facility] SPECIAL PROCEDURES    KNEE ARTHROSCOPY      left    KYPHOSIS SURGERY      LAMINECTOMY      LEG SECTION    Prognosis: Fair    Condition at Discharge: Stable    Rehab Potential (if transferring to Rehab): Good    Recommended Labs or Other Treatments After Discharge: N/A    Physician Certification: I certify the above information and transfer of Kia Garcia  is necessary for the continuing treatment of the diagnosis listed and that she requires Home Care for greater 30 days.      Update Admission H&P: No change in H&P    PHYSICIAN SIGNATURE:  Electronically signed by LORENZO Garibay CNP on 10/28/23 at 1:55 PM EDT

## 2023-10-28 NOTE — PROGRESS NOTES
FAILURE NURSE/COORDINATOR  IP CONSULT TO DIETITIAN      This patient has a high likelihood of being discharged tomorrow and is appropriate for A1 Discharge Unit in AM pending clinical course overnight: []Yes  [x]No    ------------------------------------------------------------------------------------------------------------------------------------------------------------------------    MDM    Moderate    Medications:  Personally reviewed in detail in conjunction w/ labs as documented for evidence of drug toxicity. Infusion Medications    sodium chloride 5 mL/hr at 10/21/23 0706    dextrose       Scheduled Medications    torsemide  40 mg Oral Daily    magnesium oxide  400 mg Oral Daily    insulin glargine  10 Units SubCUTAneous BID    spironolactone  25 mg Oral Daily    potassium chloride  20 mEq Oral Daily    predniSONE  4 mg Oral Daily    metoprolol succinate  25 mg Oral Daily    enoxaparin  40 mg SubCUTAneous Daily    guaiFENesin  600 mg Oral BID    tiotropium-olodaterol  2 puff Inhalation Daily    wound/burn dressing   Topical Daily    sodium chloride flush  5-40 mL IntraVENous 2 times per day    atorvastatin  40 mg Oral Daily    cetirizine  10 mg Oral Daily    clopidogrel  75 mg Oral Daily    DULoxetine  60 mg Oral BID    gabapentin  300 mg Oral BID    morphine  15 mg Oral 2 times per day    pantoprazole  40 mg Oral QAM AC    Roflumilast  500 mcg Oral Daily    sacubitril-valsartan  0.5 tablet Oral BID    aspirin  81 mg Oral Daily    budesonide  0.5 mg Nebulization BID RT    insulin lispro  0-16 Units SubCUTAneous Q4H     PRN Meds: albuterol, sodium chloride flush, sodium chloride, acetaminophen, benzonatate, calcium carbonate, cyclobenzaprine, dextrose, dextrose bolus **OR** dextrose bolus, docusate sodium, glucagon (rDNA), glucose, ondansetron **OR** ondansetron, polyethylene glycol, oxyCODONE, nitroGLYCERIN, morphine     Labs:  Personally reviewed and interpreted for clinical significance.      No results

## 2023-10-28 NOTE — PLAN OF CARE
Problem: Chronic Conditions and Co-morbidities  Goal: Patient's chronic conditions and co-morbidity symptoms are monitored and maintained or improved  Outcome: Progressing  Care Plan - Patient's Chronic Conditions and Co-Morbidity Symptoms are Monitored and Maintained or Improved: Monitor and assess patient's chronic conditions and comorbid symptoms for stability, deterioration, or improvement     Problem: Pain  Goal: Verbalizes/displays adequate comfort level or baseline comfort level  Outcome: Progressing  Flowsheets  Verbalizes/displays adequate comfort level or baseline comfort level: Encourage patient to monitor pain and request assistance     Problem: Safety - Adult  Goal: Free from fall injury  Outcome: Progressing

## 2023-10-30 NOTE — TELEPHONE ENCOUNTER
Nystatin sent, confirm pharmacy has available.
Patient informed of 's response with verbal understanding.
Pt states she saw Dr Abilio Peña yesterday , requesting message to Dr Abilio Peña to please order something for her \"thrush\". Pharmacy : Pharmacy:  [facility]/pharmacy #[id number] - [address] 05/15/19  Assessment:   1. Chronic bronchitis/cough with cylindrical bronchiectasis, Has done well on Daliresp  - now with acute exacerbation   2. Abnormal CXR  3. Community acquired pneumonia - failed doxy     Not addressed today  2. Rheumatoid arthritis on Embrel and MTX  3. Pulmonary Nodule 8 mm left lower lobe, has decreased in size on serial imaging  4. Positive tobacco history, 20 pack year quit in 1991  5. Diabetes                Plan:   1. Continue Daliresp; off Singulair  -- Failed Singulair, Failed Dulera, Failed Spiriva, Failed Advair. 2.  CT CHEST now no IV dye  3. Call for result of CT  4. Start Levaquin and prednisone   5.   Sputum culture
none

## 2023-10-31 ENCOUNTER — FOLLOWUP TELEPHONE ENCOUNTER (OUTPATIENT)
Dept: TELEMETRY | Age: 73
End: 2023-10-31

## 2023-10-31 LAB
ACID FAST STN SPEC QL: NORMAL
ACID FAST STN SPEC QL: NORMAL
MYCOBACTERIUM SPEC CULT: NORMAL
MYCOBACTERIUM SPEC CULT: NORMAL

## 2023-10-31 NOTE — TELEPHONE ENCOUNTER
Care Transitions Initial Follow Up Call    Call within 2 business days of discharge: Yes     Patient: Mike Oscar Patient : 1950 MRN: [id number]    [unfilled]    RARS: Readmission Risk Score: 24.6       Spoke with: patient     Discharge department/facility: home    Non-face-to-face services provided:  Scheduled appointment with PCP-23    Spoke to patient for hospital follow up. Pt states that she is doing well and denies any needs. States she is glad to be home.       Follow Up  Future Appointments   Date Time Provider [facility] [address]   2023 10:30 AM Christiana Storey MD [facility]   2023 10:30 AM LORENZO Nails - CNP [facility]   2024 11:15 AM Andrew Gonzalez MD [facility]       Hanna Del Rio, RN

## 2023-11-01 ENCOUNTER — OFFICE VISIT (OUTPATIENT)
Dept: PULMONOLOGY | Age: 73
End: 2023-11-01
Payer: MEDICARE

## 2023-11-01 VITALS
SYSTOLIC BLOOD PRESSURE: 108 MMHG | DIASTOLIC BLOOD PRESSURE: 66 MMHG | HEIGHT: 67 IN | HEART RATE: 73 BPM | RESPIRATION RATE: 16 BRPM | WEIGHT: 187 LBS | BODY MASS INDEX: 29.35 KG/M2 | OXYGEN SATURATION: 91 %

## 2023-11-01 DIAGNOSIS — G47.33 OSA (OBSTRUCTIVE SLEEP APNEA): Primary | ICD-10-CM

## 2023-11-01 DIAGNOSIS — J47.9 BRONCHIECTASIS WITHOUT COMPLICATION (HCC): ICD-10-CM

## 2023-11-01 PROCEDURE — 3074F SYST BP LT 130 MM HG: CPT | Performed by: INTERNAL MEDICINE

## 2023-11-01 PROCEDURE — 1123F ACP DISCUSS/DSCN MKR DOCD: CPT | Performed by: INTERNAL MEDICINE

## 2023-11-01 PROCEDURE — 3078F DIAST BP <80 MM HG: CPT | Performed by: INTERNAL MEDICINE

## 2023-11-01 PROCEDURE — 99214 OFFICE O/P EST MOD 30 MIN: CPT | Performed by: INTERNAL MEDICINE

## 2023-11-01 RX ORDER — PREDNISONE 10 MG/1
TABLET ORAL
Qty: 40 TABLET | Refills: 3 | Status: SHIPPED | OUTPATIENT
Start: 2023-11-01

## 2023-11-01 ASSESSMENT — SLEEP AND FATIGUE QUESTIONNAIRES
HOW LIKELY ARE YOU TO NOD OFF OR FALL ASLEEP WHILE SITTING QUIETLY AFTER LUNCH WITHOUT ALCOHOL: 0
HOW LIKELY ARE YOU TO NOD OFF OR FALL ASLEEP WHEN YOU ARE A PASSENGER IN A CAR FOR AN HOUR WITHOUT A BREAK: 3
HOW LIKELY ARE YOU TO NOD OFF OR FALL ASLEEP WHILE WATCHING TV: 1
HOW LIKELY ARE YOU TO NOD OFF OR FALL ASLEEP IN A CAR, WHILE STOPPED FOR A FEW MINUTES IN TRAFFIC: 0
HOW LIKELY ARE YOU TO NOD OFF OR FALL ASLEEP WHILE SITTING AND READING: 2
HOW LIKELY ARE YOU TO NOD OFF OR FALL ASLEEP WHILE LYING DOWN TO REST IN THE AFTERNOON WHEN CIRCUMSTANCES PERMIT: 3
NECK CIRCUMFERENCE (INCHES): 13.5
HOW LIKELY ARE YOU TO NOD OFF OR FALL ASLEEP WHILE SITTING AND TALKING TO SOMEONE: 0
ESS TOTAL SCORE: 9
HOW LIKELY ARE YOU TO NOD OFF OR FALL ASLEEP WHILE SITTING INACTIVE IN A PUBLIC PLACE: 0

## 2023-11-01 NOTE — PROGRESS NOTES
changed to Zpac, some improvement; then treated with prednisone & Zpac & then clearly better, then returned & retreated with cough medicine & prednisone. No cough prior to 3 months ago except with sinus drainage. Cough has slowly been improving since last visit here. DATA:   Blood pressure 108/66, pulse 73, resp. rate 16, height 1.702 m (5' 7.01\"), weight 84.8 kg (187 lb), SpO2 91 %. 'on RA  Constitutional:  No acute distress. HENT:  Oropharynx is clear and moist.  Mallampati 2  Neck: No tracheal deviation present. 13.5 inches   Cardiovascular: Normal heart sounds.  + L>>R lower extremity edema   Pulmonary/Chest: few wheezes. No rhonchi. No rales. + decreased breath sounds. No accessory muscle usage or stridor. Musculoskeletal: No cyanosis. No clubbing. Skin: Skin is warm and dry. Psychiatric: Normal mood and affect. Neurologic: speech fluent, alert and oriented, strength symmetric      DATA:   - PFTs -   PFT 1/2012   FEV1 2.2 L 77% nl ratio TLC 84% DLCO 18.29 88%  PFT 4/22/2013 FEV1 2.26 L 70%  TLC 92% DLCO 19.08 69%  PFT Mar 2014  FEV1 2.08 L 65%  TLC 5.63 88% DLCO 20.05 73%  PFT June 2014 FEV1 2.12 L 69%  TLC 6.14 99% DLCO 18.31 68%  PFT April 2016 FEV1 2.24 L FVC 3.2 L TLC 6.36 l DLCO 19.16 72%    - Imaging -   CTPA in ED 2/17/23  IMPRESSION:   1. No evidence of pulmonary embolism or acute pulmonary abnormality. 2.  Multiple bilateral lung cysts which are larger but still thin walled. 3.  Small fixed hiatus hernia   4. Mild opacity in the lingula and left lower lobe    CT Chest non-con 8/5/23; compared with Feb 2023  Mediastinum: Thyroid gland is unremarkable. Post sternotomy changes are  noted. Streak artifact is seen from left atrial appendage clip. No  pericardial effusion. No pericardial calcification. Small mediastinal and  hilar nodes are noted. Fluid is seen in the distal esophagus above the GE  junction, suggesting reflux.    Lungs/pleura: Respiratory motion artifact limits

## 2023-11-01 NOTE — PATIENT INSTRUCTIONS
Call [facility] to check status of your application for Trelegy assistance:     [phone]              The Burnie Schiller and [address] at [address], [address] [facility], [address] [facility],[address]                  The Sleep center at [facility], [address], [place], [address]                      Phone: [phone] Fax: [phone]                Dear [address] [facility] Patient,     For in-lab testing please, bring with you:  Appropriate nightclothes (pajamas, sweats, etc.), slippers and robe  All medications you will need during you stay, including any breathing medications, nebulizers and metered dose inhalers  Your own toiletries and hairdryer if you wish to shower before you leave  Current photo I.D. and Insurance card  We do not allow any pillows or bed linens from home due to health regulations  -We recommend that you not bring valuables with you to the Sleep Center, as we cannot be responsible for any lost or damaged personal items. Please be aware that [facility] is a non-smoking facility. There is no smoking allowed anywhere on [employer] property at any time. Each patient room is a private room with a private bathroom. The in-lab test itself consist of electrodes and sensors attached to specific areas of your scalp, face, chest and legs. We will also monitor respiratory effort, nasal and oral airflow and oxygen levels. The test will not hurt- it is painless and not invasive in any way. At home testing when you come to  the rental unit, please bring:   -I. D. and Insurance card  **Please note the Home Sleep Test Units are limited.  It is a 1 night rental and must be dropped off the following day to ensure you are not billed a late or replacement fee**    Please refrain from or reduce the use of caffeine and/or alcohol prior to your sleep study and avoid napping the day of your study, as these will

## 2023-11-05 ENCOUNTER — APPOINTMENT (OUTPATIENT)
Dept: GENERAL RADIOLOGY | Age: 73
DRG: 871 | End: 2023-11-05
Payer: MEDICARE

## 2023-11-05 ENCOUNTER — APPOINTMENT (OUTPATIENT)
Dept: CT IMAGING | Age: 73
DRG: 871 | End: 2023-11-05
Payer: MEDICARE

## 2023-11-05 ENCOUNTER — HOSPITAL ENCOUNTER (INPATIENT)
Age: 73
LOS: 3 days | Discharge: HOME HEALTH | DRG: 871 | End: 2023-11-09
Attending: EMERGENCY MEDICINE | Admitting: INTERNAL MEDICINE
Payer: MEDICARE

## 2023-11-05 DIAGNOSIS — N30.00 ACUTE CYSTITIS WITHOUT HEMATURIA: ICD-10-CM
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
## 2023-11-05 DIAGNOSIS — I50.22 CHRONIC SYSTOLIC CONGESTIVE HEART FAILURE (HCC): ICD-10-CM

## 2023-11-05 DIAGNOSIS — S31.000D WOUND OF SACRAL REGION, SUBSEQUENT ENCOUNTER: ICD-10-CM

## 2023-11-05 DIAGNOSIS — J18.9 HOSPITAL-ACQUIRED PNEUMONIA: ICD-10-CM

## 2023-11-05 DIAGNOSIS — R41.82 ALTERED MENTAL STATUS, UNSPECIFIED ALTERED MENTAL STATUS TYPE: Primary | ICD-10-CM

## 2023-11-05 DIAGNOSIS — N17.9 AKI (ACUTE KIDNEY INJURY) (HCC): ICD-10-CM

## 2023-11-05 LAB
ALBUMIN SERPL-MCNC: 3.4 G/DL (ref 3.4–5)
ALBUMIN/GLOB SERPL: 1 {RATIO} (ref 1.1–2.2)
ALP SERPL-CCNC: 80 U/L (ref 40–129)
ALT SERPL-CCNC: 11 U/L (ref 10–40)
ANION GAP SERPL CALCULATED.3IONS-SCNC: 12 MMOL/L (ref 3–16)
AST SERPL-CCNC: 15 U/L (ref 15–37)
BACTERIA URNS QL MICRO: ABNORMAL /HPF
BASOPHILS # BLD: 0 K/UL (ref 0–0.2)
BASOPHILS NFR BLD: 0.4 %
BILIRUB SERPL-MCNC: 0.4 MG/DL (ref 0–1)
BILIRUB UR QL STRIP.AUTO: NEGATIVE
BUN SERPL-MCNC: 36 MG/DL (ref 7–20)
CALCIUM SERPL-MCNC: 8.9 MG/DL (ref 8.3–10.6)
CASTS #/AREA URNS LPF: ABNORMAL /LPF
CHLORIDE SERPL-SCNC: 94 MMOL/L (ref 99–110)
CLARITY UR: CLEAR
CO2 SERPL-SCNC: 27 MMOL/L (ref 21–32)
COLOR UR: YELLOW
CREAT SERPL-MCNC: 1.8 MG/DL (ref 0.6–1.2)
DEPRECATED RDW RBC AUTO: 15.6 % (ref 12.4–15.4)
EOSINOPHIL # BLD: 0.4 K/UL (ref 0–0.6)
EOSINOPHIL NFR BLD: 4 %
EPI CELLS #/AREA URNS HPF: ABNORMAL /HPF (ref 0–5)
FLUAV RNA RESP QL NAA+PROBE: NOT DETECTED
FLUBV RNA RESP QL NAA+PROBE: NOT DETECTED
GFR SERPLBLD CREATININE-BSD FMLA CKD-EPI: 30 ML/MIN/{1.73_M2}
GLUCOSE BLD-MCNC: 58 MG/DL (ref 70–99)
GLUCOSE BLD-MCNC: 77 MG/DL (ref 70–99)
GLUCOSE SERPL-MCNC: 72 MG/DL (ref 70–99)
GLUCOSE UR STRIP.AUTO-MCNC: NEGATIVE MG/DL
HCT VFR BLD AUTO: 26 % (ref 36–48)
HGB BLD-MCNC: 8.4 G/DL (ref 12–16)
HGB UR QL STRIP.AUTO: NEGATIVE
HYALINE CASTS #/AREA URNS LPF: ABNORMAL /LPF (ref 0–2)
KETONES UR STRIP.AUTO-MCNC: NEGATIVE MG/DL
LACTATE BLDV-SCNC: 1.4 MMOL/L (ref 0.4–2)
LACTATE BLDV-SCNC: 1.6 MMOL/L (ref 0.4–1.9)
LACTATE BLDV-SCNC: 3.3 MMOL/L (ref 0.4–1.9)
LEUKOCYTE ESTERASE UR QL STRIP.AUTO: ABNORMAL
LYMPHOCYTES # BLD: 1.2 K/UL (ref 1–5.1)
LYMPHOCYTES NFR BLD: 12.3 %
MCH RBC QN AUTO: 29.1 PG (ref 26–34)
MCHC RBC AUTO-ENTMCNC: 32.5 G/DL (ref 31–36)
MCV RBC AUTO: 89.5 FL (ref 80–100)
MONOCYTES # BLD: 0.5 K/UL (ref 0–1.3)
MONOCYTES NFR BLD: 5.7 %
NEUTROPHILS # BLD: 7.4 K/UL (ref 1.7–7.7)
NEUTROPHILS NFR BLD: 77.6 %
NITRITE UR QL STRIP.AUTO: NEGATIVE
NT-PROBNP SERPL-MCNC: 8758 PG/ML (ref 0–124)
PERFORMED ON: ABNORMAL
PERFORMED ON: NORMAL
PH UR STRIP.AUTO: 6 [PH] (ref 5–8)
PLATELET # BLD AUTO: 409 K/UL (ref 135–450)
PMV BLD AUTO: 7.1 FL (ref 5–10.5)
POTASSIUM SERPL-SCNC: 4.3 MMOL/L (ref 3.5–5.1)
PROCALCITONIN SERPL IA-MCNC: 0.48 NG/ML (ref 0–0.15)
PROT SERPL-MCNC: 6.9 G/DL (ref 6.4–8.2)
PROT UR STRIP.AUTO-MCNC: NEGATIVE MG/DL
RBC # BLD AUTO: 2.9 M/UL (ref 4–5.2)
RBC #/AREA URNS HPF: ABNORMAL /HPF (ref 0–4)
SARS-COV-2 RNA RESP QL NAA+PROBE: NOT DETECTED
SODIUM SERPL-SCNC: 133 MMOL/L (ref 136–145)
SP GR UR STRIP.AUTO: 1.01 (ref 1–1.03)
TROPONIN, HIGH SENSITIVITY: 51 NG/L (ref 0–14)
TROPONIN, HIGH SENSITIVITY: 55 NG/L (ref 0–14)
UA COMPLETE W REFLEX CULTURE PNL UR: YES
UA DIPSTICK W REFLEX MICRO PNL UR: YES
URN SPEC COLLECT METH UR: ABNORMAL
UROBILINOGEN UR STRIP-ACNC: 0.2 E.U./DL
WBC # BLD AUTO: 9.5 K/UL (ref 4–11)
WBC #/AREA URNS HPF: ABNORMAL /HPF (ref 0–5)

## 2023-11-05 PROCEDURE — 87040 BLOOD CULTURE FOR BACTERIA: CPT

## 2023-11-05 PROCEDURE — 87086 URINE CULTURE/COLONY COUNT: CPT

## 2023-11-05 PROCEDURE — 99285 EMERGENCY DEPT VISIT HI MDM: CPT

## 2023-11-05 PROCEDURE — 93005 ELECTROCARDIOGRAM TRACING: CPT | Performed by: PHYSICIAN ASSISTANT

## 2023-11-05 PROCEDURE — 85025 COMPLETE CBC W/AUTO DIFF WBC: CPT

## 2023-11-05 PROCEDURE — 6370000000 HC RX 637 (ALT 250 FOR IP): Performed by: PHYSICIAN ASSISTANT

## 2023-11-05 PROCEDURE — 6360000002 HC RX W HCPCS: Performed by: PHYSICIAN ASSISTANT

## 2023-11-05 PROCEDURE — 83605 ASSAY OF LACTIC ACID: CPT

## 2023-11-05 PROCEDURE — 51702 INSERT TEMP BLADDER CATH: CPT

## 2023-11-05 PROCEDURE — 87186 SC STD MICRODIL/AGAR DIL: CPT

## 2023-11-05 PROCEDURE — 96366 THER/PROPH/DIAG IV INF ADDON: CPT

## 2023-11-05 PROCEDURE — 87088 URINE BACTERIA CULTURE: CPT

## 2023-11-05 PROCEDURE — 2500000003 HC RX 250 WO HCPCS: Performed by: EMERGENCY MEDICINE

## 2023-11-05 PROCEDURE — 70450 CT HEAD/BRAIN W/O DYE: CPT

## 2023-11-05 PROCEDURE — 87636 SARSCOV2 & INF A&B AMP PRB: CPT

## 2023-11-05 PROCEDURE — 96367 TX/PROPH/DG ADDL SEQ IV INF: CPT

## 2023-11-05 PROCEDURE — 36415 COLL VENOUS BLD VENIPUNCTURE: CPT

## 2023-11-05 PROCEDURE — 80053 COMPREHEN METABOLIC PANEL: CPT

## 2023-11-05 PROCEDURE — 2580000003 HC RX 258: Performed by: PHYSICIAN ASSISTANT

## 2023-11-05 PROCEDURE — 02HV33Z INSERTION OF INFUSION DEVICE INTO SUPERIOR VENA CAVA, PERCUTANEOUS APPROACH: ICD-10-PCS | Performed by: INTERNAL MEDICINE

## 2023-11-05 PROCEDURE — 94640 AIRWAY INHALATION TREATMENT: CPT

## 2023-11-05 PROCEDURE — 84484 ASSAY OF TROPONIN QUANT: CPT

## 2023-11-05 PROCEDURE — 71045 X-RAY EXAM CHEST 1 VIEW: CPT

## 2023-11-05 PROCEDURE — 71250 CT THORAX DX C-: CPT

## 2023-11-05 PROCEDURE — 81001 URINALYSIS AUTO W/SCOPE: CPT

## 2023-11-05 PROCEDURE — 83880 ASSAY OF NATRIURETIC PEPTIDE: CPT

## 2023-11-05 PROCEDURE — 84145 PROCALCITONIN (PCT): CPT

## 2023-11-05 PROCEDURE — 96365 THER/PROPH/DIAG IV INF INIT: CPT

## 2023-11-05 PROCEDURE — 36556 INSERT NON-TUNNEL CV CATH: CPT

## 2023-11-05 RX ORDER — 0.9 % SODIUM CHLORIDE 0.9 %
500 INTRAVENOUS SOLUTION INTRAVENOUS ONCE
Status: COMPLETED | OUTPATIENT
Start: 2023-11-05 | End: 2023-11-05

## 2023-11-05 RX ORDER — IPRATROPIUM BROMIDE AND ALBUTEROL SULFATE 2.5; .5 MG/3ML; MG/3ML
1 SOLUTION RESPIRATORY (INHALATION) ONCE
Status: COMPLETED | OUTPATIENT
Start: 2023-11-05 | End: 2023-11-05

## 2023-11-05 RX ORDER — 0.9 % SODIUM CHLORIDE 0.9 %
500 INTRAVENOUS SOLUTION INTRAVENOUS ONCE
Status: DISCONTINUED | OUTPATIENT
Start: 2023-11-05 | End: 2023-11-09 | Stop reason: HOSPADM

## 2023-11-05 RX ORDER — DOBUTAMINE HYDROCHLORIDE 200 MG/100ML
2.5-1 INJECTION INTRAVENOUS CONTINUOUS
Status: DISCONTINUED | OUTPATIENT
Start: 2023-11-05 | End: 2023-11-06

## 2023-11-05 RX ORDER — DEXTROSE MONOHYDRATE 25 G/50ML
6.25 INJECTION, SOLUTION INTRAVENOUS ONCE
Status: COMPLETED | OUTPATIENT
Start: 2023-11-05 | End: 2023-11-05

## 2023-11-05 RX ORDER — 0.9 % SODIUM CHLORIDE 0.9 %
250 INTRAVENOUS SOLUTION INTRAVENOUS ONCE
Status: COMPLETED | OUTPATIENT
Start: 2023-11-05 | End: 2023-11-05

## 2023-11-05 RX ADMIN — DEXTROSE MONOHYDRATE 6.25 G: 25 INJECTION, SOLUTION INTRAVENOUS at 22:11

## 2023-11-05 RX ADMIN — CEFEPIME 2000 MG: 2 INJECTION, POWDER, FOR SOLUTION INTRAVENOUS at 20:12

## 2023-11-05 RX ADMIN — SODIUM CHLORIDE 250 ML: 9 INJECTION, SOLUTION INTRAVENOUS at 20:03

## 2023-11-05 RX ADMIN — SODIUM CHLORIDE 500 ML: 9 INJECTION, SOLUTION INTRAVENOUS at 21:34

## 2023-11-05 RX ADMIN — IPRATROPIUM BROMIDE AND ALBUTEROL SULFATE 1 DOSE: 2.5; .5 SOLUTION RESPIRATORY (INHALATION) at 20:48

## 2023-11-05 RX ADMIN — AZITHROMYCIN MONOHYDRATE 500 MG: 500 INJECTION, POWDER, LYOPHILIZED, FOR SOLUTION INTRAVENOUS at 21:07

## 2023-11-05 RX ADMIN — VANCOMYCIN HYDROCHLORIDE 2000 MG: 10 INJECTION, POWDER, LYOPHILIZED, FOR SOLUTION INTRAVENOUS at 21:59

## 2023-11-05 ASSESSMENT — PAIN - FUNCTIONAL ASSESSMENT: PAIN_FUNCTIONAL_ASSESSMENT: NONE - DENIES PAIN

## 2023-11-06 PROBLEM — R79.89 ELEVATED BRAIN NATRIURETIC PEPTIDE (BNP) LEVEL: Status: ACTIVE | Noted: 2023-11-06

## 2023-11-06 PROBLEM — E87.1 HYPONATREMIA: Status: ACTIVE | Noted: 2023-11-06

## 2023-11-06 PROBLEM — R79.89 ELEVATED TROPONIN: Status: ACTIVE | Noted: 2023-11-06

## 2023-11-06 PROBLEM — R91.8 PULMONARY INFILTRATES: Status: ACTIVE | Noted: 2023-11-06

## 2023-11-06 PROBLEM — A41.9 SEPSIS (HCC): Status: ACTIVE | Noted: 2023-11-06

## 2023-11-06 PROBLEM — R65.21 SEPTIC SHOCK (HCC): Status: ACTIVE | Noted: 2023-11-06

## 2023-11-06 PROBLEM — R82.81 PYURIA: Status: ACTIVE | Noted: 2023-11-06

## 2023-11-06 LAB
ANION GAP SERPL CALCULATED.3IONS-SCNC: 10 MMOL/L (ref 3–16)
BUN SERPL-MCNC: 30 MG/DL (ref 7–20)
CALCIUM SERPL-MCNC: 7.9 MG/DL (ref 8.3–10.6)
CHLORIDE SERPL-SCNC: 96 MMOL/L (ref 99–110)
CO2 SERPL-SCNC: 25 MMOL/L (ref 21–32)
CREAT SERPL-MCNC: 1.4 MG/DL (ref 0.6–1.2)
DEPRECATED RDW RBC AUTO: 15.7 % (ref 12.4–15.4)
EKG ATRIAL RATE: 82 BPM
EKG DIAGNOSIS: NORMAL
EKG P AXIS: 53 DEGREES
EKG P-R INTERVAL: 164 MS
EKG Q-T INTERVAL: 384 MS
EKG QRS DURATION: 112 MS
EKG QTC CALCULATION (BAZETT): 448 MS
EKG R AXIS: -30 DEGREES
EKG T AXIS: 51 DEGREES
EKG VENTRICULAR RATE: 82 BPM
GFR SERPLBLD CREATININE-BSD FMLA CKD-EPI: 40 ML/MIN/{1.73_M2}
GLUCOSE BLD-MCNC: 328 MG/DL (ref 70–99)
GLUCOSE BLD-MCNC: 380 MG/DL (ref 70–99)
GLUCOSE BLD-MCNC: 80 MG/DL (ref 70–99)
GLUCOSE SERPL-MCNC: 115 MG/DL (ref 70–99)
HCT VFR BLD AUTO: 23.9 % (ref 36–48)
HGB BLD-MCNC: 7.7 G/DL (ref 12–16)
MAGNESIUM SERPL-MCNC: 1.9 MG/DL (ref 1.8–2.4)
MCH RBC QN AUTO: 28.8 PG (ref 26–34)
MCHC RBC AUTO-ENTMCNC: 32.4 G/DL (ref 31–36)
MCV RBC AUTO: 89.2 FL (ref 80–100)
PERFORMED ON: ABNORMAL
PERFORMED ON: ABNORMAL
PERFORMED ON: NORMAL
PLATELET # BLD AUTO: 357 K/UL (ref 135–450)
PMV BLD AUTO: 6.6 FL (ref 5–10.5)
POTASSIUM SERPL-SCNC: 3.6 MMOL/L (ref 3.5–5.1)
RBC # BLD AUTO: 2.68 M/UL (ref 4–5.2)
SODIUM SERPL-SCNC: 131 MMOL/L (ref 136–145)
VANCOMYCIN SERPL-MCNC: 12.8 UG/ML
WBC # BLD AUTO: 9.1 K/UL (ref 4–11)

## 2023-11-06 PROCEDURE — 6360000002 HC RX W HCPCS: Performed by: PHYSICIAN ASSISTANT

## 2023-11-06 PROCEDURE — 6360000002 HC RX W HCPCS: Performed by: INTERNAL MEDICINE

## 2023-11-06 PROCEDURE — 6370000000 HC RX 637 (ALT 250 FOR IP): Performed by: INTERNAL MEDICINE

## 2023-11-06 PROCEDURE — 93010 ELECTROCARDIOGRAM REPORT: CPT | Performed by: INTERNAL MEDICINE

## 2023-11-06 PROCEDURE — 99291 CRITICAL CARE FIRST HOUR: CPT | Performed by: INTERNAL MEDICINE

## 2023-11-06 PROCEDURE — 97162 PT EVAL MOD COMPLEX 30 MIN: CPT

## 2023-11-06 PROCEDURE — 2700000000 HC OXYGEN THERAPY PER DAY

## 2023-11-06 PROCEDURE — C9113 INJ PANTOPRAZOLE SODIUM, VIA: HCPCS | Performed by: INTERNAL MEDICINE

## 2023-11-06 PROCEDURE — 87449 NOS EACH ORGANISM AG IA: CPT

## 2023-11-06 PROCEDURE — 97166 OT EVAL MOD COMPLEX 45 MIN: CPT

## 2023-11-06 PROCEDURE — 85027 COMPLETE CBC AUTOMATED: CPT

## 2023-11-06 PROCEDURE — 97530 THERAPEUTIC ACTIVITIES: CPT

## 2023-11-06 PROCEDURE — 2000000000 HC ICU R&B

## 2023-11-06 PROCEDURE — 97535 SELF CARE MNGMENT TRAINING: CPT

## 2023-11-06 PROCEDURE — 6370000000 HC RX 637 (ALT 250 FOR IP): Performed by: NURSE PRACTITIONER

## 2023-11-06 PROCEDURE — 83735 ASSAY OF MAGNESIUM: CPT

## 2023-11-06 PROCEDURE — 97110 THERAPEUTIC EXERCISES: CPT

## 2023-11-06 PROCEDURE — 94761 N-INVAS EAR/PLS OXIMETRY MLT: CPT

## 2023-11-06 PROCEDURE — 2580000003 HC RX 258: Performed by: INTERNAL MEDICINE

## 2023-11-06 PROCEDURE — 6370000000 HC RX 637 (ALT 250 FOR IP): Performed by: EMERGENCY MEDICINE

## 2023-11-06 PROCEDURE — 80202 ASSAY OF VANCOMYCIN: CPT

## 2023-11-06 PROCEDURE — 2500000003 HC RX 250 WO HCPCS: Performed by: INTERNAL MEDICINE

## 2023-11-06 PROCEDURE — 80048 BASIC METABOLIC PNL TOTAL CA: CPT

## 2023-11-06 PROCEDURE — 2580000003 HC RX 258: Performed by: EMERGENCY MEDICINE

## 2023-11-06 PROCEDURE — 36592 COLLECT BLOOD FROM PICC: CPT

## 2023-11-06 PROCEDURE — 94640 AIRWAY INHALATION TREATMENT: CPT

## 2023-11-06 RX ORDER — DULOXETIN HYDROCHLORIDE 60 MG/1
60 CAPSULE, DELAYED RELEASE ORAL 2 TIMES DAILY
Status: DISCONTINUED | OUTPATIENT
Start: 2023-11-06 | End: 2023-11-09 | Stop reason: HOSPADM

## 2023-11-06 RX ORDER — MAGNESIUM HYDROXIDE/ALUMINUM HYDROXICE/SIMETHICONE 120; 1200; 1200 MG/30ML; MG/30ML; MG/30ML
30 SUSPENSION ORAL EVERY 6 HOURS PRN
Status: DISCONTINUED | OUTPATIENT
Start: 2023-11-06 | End: 2023-11-09 | Stop reason: HOSPADM

## 2023-11-06 RX ORDER — SODIUM CHLORIDE 9 MG/ML
INJECTION, SOLUTION INTRAVENOUS PRN
Status: DISCONTINUED | OUTPATIENT
Start: 2023-11-06 | End: 2023-11-09 | Stop reason: HOSPADM

## 2023-11-06 RX ORDER — PANTOPRAZOLE SODIUM 40 MG/1
40 TABLET, DELAYED RELEASE ORAL
Status: DISCONTINUED | OUTPATIENT
Start: 2023-11-06 | End: 2023-11-06

## 2023-11-06 RX ORDER — INSULIN LISPRO 100 [IU]/ML
0-4 INJECTION, SOLUTION INTRAVENOUS; SUBCUTANEOUS NIGHTLY
Status: DISCONTINUED | OUTPATIENT
Start: 2023-11-06 | End: 2023-11-09 | Stop reason: HOSPADM

## 2023-11-06 RX ORDER — PANTOPRAZOLE SODIUM 40 MG/10ML
40 INJECTION, POWDER, LYOPHILIZED, FOR SOLUTION INTRAVENOUS DAILY
Status: DISCONTINUED | OUTPATIENT
Start: 2023-11-06 | End: 2023-11-09 | Stop reason: HOSPADM

## 2023-11-06 RX ORDER — ACETAMINOPHEN 325 MG/1
650 TABLET ORAL EVERY 6 HOURS PRN
Status: DISCONTINUED | OUTPATIENT
Start: 2023-11-06 | End: 2023-11-09 | Stop reason: HOSPADM

## 2023-11-06 RX ORDER — ENOXAPARIN SODIUM 100 MG/ML
40 INJECTION SUBCUTANEOUS DAILY
Status: DISCONTINUED | OUTPATIENT
Start: 2023-11-06 | End: 2023-11-09 | Stop reason: HOSPADM

## 2023-11-06 RX ORDER — ONDANSETRON 2 MG/ML
4 INJECTION INTRAMUSCULAR; INTRAVENOUS EVERY 6 HOURS PRN
Status: DISCONTINUED | OUTPATIENT
Start: 2023-11-06 | End: 2023-11-09 | Stop reason: HOSPADM

## 2023-11-06 RX ORDER — GUAIFENESIN 600 MG/1
600 TABLET, EXTENDED RELEASE ORAL 2 TIMES DAILY
Status: DISCONTINUED | OUTPATIENT
Start: 2023-11-06 | End: 2023-11-09 | Stop reason: HOSPADM

## 2023-11-06 RX ORDER — ONDANSETRON 4 MG/1
4 TABLET, ORALLY DISINTEGRATING ORAL EVERY 8 HOURS PRN
Status: DISCONTINUED | OUTPATIENT
Start: 2023-11-06 | End: 2023-11-09 | Stop reason: HOSPADM

## 2023-11-06 RX ORDER — DEXTROSE MONOHYDRATE 100 MG/ML
INJECTION, SOLUTION INTRAVENOUS CONTINUOUS PRN
Status: DISCONTINUED | OUTPATIENT
Start: 2023-11-06 | End: 2023-11-09 | Stop reason: HOSPADM

## 2023-11-06 RX ORDER — SODIUM CHLORIDE 0.9 % (FLUSH) 0.9 %
5-40 SYRINGE (ML) INJECTION PRN
Status: DISCONTINUED | OUTPATIENT
Start: 2023-11-06 | End: 2023-11-09 | Stop reason: HOSPADM

## 2023-11-06 RX ORDER — SODIUM CHLORIDE 0.9 % (FLUSH) 0.9 %
5-40 SYRINGE (ML) INJECTION EVERY 12 HOURS SCHEDULED
Status: DISCONTINUED | OUTPATIENT
Start: 2023-11-06 | End: 2023-11-09 | Stop reason: HOSPADM

## 2023-11-06 RX ORDER — INSULIN GLARGINE 100 [IU]/ML
15 INJECTION, SOLUTION SUBCUTANEOUS 2 TIMES DAILY
Status: DISCONTINUED | OUTPATIENT
Start: 2023-11-06 | End: 2023-11-06

## 2023-11-06 RX ORDER — ALBUTEROL SULFATE 90 UG/1
2 AEROSOL, METERED RESPIRATORY (INHALATION) EVERY 4 HOURS PRN
Status: DISCONTINUED | OUTPATIENT
Start: 2023-11-06 | End: 2023-11-09 | Stop reason: HOSPADM

## 2023-11-06 RX ORDER — INSULIN LISPRO 100 [IU]/ML
0-16 INJECTION, SOLUTION INTRAVENOUS; SUBCUTANEOUS
Status: DISCONTINUED | OUTPATIENT
Start: 2023-11-06 | End: 2023-11-09 | Stop reason: HOSPADM

## 2023-11-06 RX ORDER — POLYETHYLENE GLYCOL 3350 17 G/17G
17 POWDER, FOR SOLUTION ORAL DAILY PRN
Status: DISCONTINUED | OUTPATIENT
Start: 2023-11-06 | End: 2023-11-09 | Stop reason: HOSPADM

## 2023-11-06 RX ORDER — CLOPIDOGREL BISULFATE 75 MG/1
75 TABLET ORAL DAILY
Status: DISCONTINUED | OUTPATIENT
Start: 2023-11-06 | End: 2023-11-09 | Stop reason: HOSPADM

## 2023-11-06 RX ORDER — ACETAMINOPHEN 500 MG
1000 TABLET ORAL ONCE
Status: COMPLETED | OUTPATIENT
Start: 2023-11-06 | End: 2023-11-06

## 2023-11-06 RX ORDER — INSULIN GLARGINE 100 [IU]/ML
15 INJECTION, SOLUTION SUBCUTANEOUS NIGHTLY
Status: DISCONTINUED | OUTPATIENT
Start: 2023-11-06 | End: 2023-11-07

## 2023-11-06 RX ORDER — SODIUM CHLORIDE 9 MG/ML
INJECTION, SOLUTION INTRAVENOUS CONTINUOUS
Status: DISCONTINUED | OUTPATIENT
Start: 2023-11-06 | End: 2023-11-08

## 2023-11-06 RX ORDER — ATORVASTATIN CALCIUM 40 MG/1
40 TABLET, FILM COATED ORAL DAILY
Status: DISCONTINUED | OUTPATIENT
Start: 2023-11-06 | End: 2023-11-09 | Stop reason: HOSPADM

## 2023-11-06 RX ORDER — ASPIRIN 81 MG/1
81 TABLET, CHEWABLE ORAL DAILY
Status: DISCONTINUED | OUTPATIENT
Start: 2023-11-06 | End: 2023-11-09 | Stop reason: HOSPADM

## 2023-11-06 RX ORDER — ACETAMINOPHEN 650 MG/1
650 SUPPOSITORY RECTAL EVERY 6 HOURS PRN
Status: DISCONTINUED | OUTPATIENT
Start: 2023-11-06 | End: 2023-11-09 | Stop reason: HOSPADM

## 2023-11-06 RX ORDER — LATANOPROST 50 UG/ML
1 SOLUTION/ DROPS OPHTHALMIC NIGHTLY
Status: DISCONTINUED | OUTPATIENT
Start: 2023-11-06 | End: 2023-11-09 | Stop reason: HOSPADM

## 2023-11-06 RX ORDER — CETIRIZINE HYDROCHLORIDE 10 MG/1
10 TABLET ORAL DAILY
Status: DISCONTINUED | OUTPATIENT
Start: 2023-11-06 | End: 2023-11-06

## 2023-11-06 RX ORDER — BISACODYL 10 MG
10 SUPPOSITORY, RECTAL RECTAL DAILY PRN
Status: DISCONTINUED | OUTPATIENT
Start: 2023-11-06 | End: 2023-11-09 | Stop reason: HOSPADM

## 2023-11-06 RX ORDER — DEXTROSE MONOHYDRATE 100 MG/ML
INJECTION, SOLUTION INTRAVENOUS CONTINUOUS PRN
Status: DISCONTINUED | OUTPATIENT
Start: 2023-11-06 | End: 2023-11-06 | Stop reason: SDUPTHER

## 2023-11-06 RX ORDER — 0.9 % SODIUM CHLORIDE 0.9 %
1000 INTRAVENOUS SOLUTION INTRAVENOUS ONCE
Status: COMPLETED | OUTPATIENT
Start: 2023-11-06 | End: 2023-11-06

## 2023-11-06 RX ORDER — DOBUTAMINE HYDROCHLORIDE 200 MG/100ML
2.5-1 INJECTION INTRAVENOUS CONTINUOUS
Status: DISCONTINUED | OUTPATIENT
Start: 2023-11-06 | End: 2023-11-06

## 2023-11-06 RX ADMIN — GUAIFENESIN 600 MG: 600 TABLET ORAL at 10:36

## 2023-11-06 RX ADMIN — ACETAMINOPHEN 650 MG: 325 TABLET ORAL at 10:56

## 2023-11-06 RX ADMIN — ENOXAPARIN SODIUM 40 MG: 100 INJECTION SUBCUTANEOUS at 10:37

## 2023-11-06 RX ADMIN — CEFEPIME 2000 MG: 2 INJECTION, POWDER, FOR SOLUTION INTRAVENOUS at 10:43

## 2023-11-06 RX ADMIN — CLOPIDOGREL BISULFATE 75 MG: 75 TABLET ORAL at 10:36

## 2023-11-06 RX ADMIN — INSULIN GLARGINE 15 UNITS: 100 INJECTION, SOLUTION SUBCUTANEOUS at 17:53

## 2023-11-06 RX ADMIN — VANCOMYCIN HYDROCHLORIDE 1000 MG: 1 INJECTION, POWDER, LYOPHILIZED, FOR SOLUTION INTRAVENOUS at 21:54

## 2023-11-06 RX ADMIN — CETIRIZINE HYDROCHLORIDE 10 MG: 10 TABLET, FILM COATED ORAL at 10:35

## 2023-11-06 RX ADMIN — INSULIN GLARGINE 15 UNITS: 100 INJECTION, SOLUTION SUBCUTANEOUS at 10:37

## 2023-11-06 RX ADMIN — PANTOPRAZOLE SODIUM 40 MG: 40 TABLET, DELAYED RELEASE ORAL at 06:51

## 2023-11-06 RX ADMIN — MUPIROCIN: 20 OINTMENT TOPICAL at 10:35

## 2023-11-06 RX ADMIN — SODIUM CHLORIDE, PRESERVATIVE FREE 10 ML: 5 INJECTION INTRAVENOUS at 22:02

## 2023-11-06 RX ADMIN — DOBUTAMINE HYDROCHLORIDE 2.5 MCG/KG/MIN: 200 INJECTION INTRAVENOUS at 03:15

## 2023-11-06 RX ADMIN — MUPIROCIN: 20 OINTMENT TOPICAL at 21:52

## 2023-11-06 RX ADMIN — HYDROCORTISONE SODIUM SUCCINATE 50 MG: 100 INJECTION, POWDER, FOR SOLUTION INTRAMUSCULAR; INTRAVENOUS at 13:25

## 2023-11-06 RX ADMIN — SODIUM CHLORIDE 5 MCG/MIN: 0.9 INJECTION, SOLUTION INTRAVENOUS at 05:35

## 2023-11-06 RX ADMIN — TIOTROPIUM BROMIDE INHALATION SPRAY 2 PUFF: 3.12 SPRAY, METERED RESPIRATORY (INHALATION) at 08:12

## 2023-11-06 RX ADMIN — ACETAMINOPHEN 650 MG: 325 TABLET ORAL at 17:53

## 2023-11-06 RX ADMIN — Medication 2 PUFF: at 08:12

## 2023-11-06 RX ADMIN — DULOXETINE HYDROCHLORIDE 60 MG: 60 CAPSULE, DELAYED RELEASE ORAL at 10:36

## 2023-11-06 RX ADMIN — ACETAMINOPHEN 1000 MG: 500 TABLET ORAL at 00:57

## 2023-11-06 RX ADMIN — SODIUM CHLORIDE 1000 ML: 9 INJECTION, SOLUTION INTRAVENOUS at 00:58

## 2023-11-06 RX ADMIN — INSULIN LISPRO 12 UNITS: 100 INJECTION, SOLUTION INTRAVENOUS; SUBCUTANEOUS at 17:53

## 2023-11-06 RX ADMIN — DULOXETINE HYDROCHLORIDE 60 MG: 60 CAPSULE, DELAYED RELEASE ORAL at 22:02

## 2023-11-06 RX ADMIN — WATER 60 MG: 1 INJECTION INTRAMUSCULAR; INTRAVENOUS; SUBCUTANEOUS at 10:36

## 2023-11-06 RX ADMIN — Medication 2 PUFF: at 19:54

## 2023-11-06 RX ADMIN — INSULIN LISPRO 4 UNITS: 100 INJECTION, SOLUTION INTRAVENOUS; SUBCUTANEOUS at 22:55

## 2023-11-06 RX ADMIN — PANTOPRAZOLE SODIUM 40 MG: 40 INJECTION, POWDER, FOR SOLUTION INTRAVENOUS at 13:24

## 2023-11-06 RX ADMIN — GUAIFENESIN 600 MG: 600 TABLET ORAL at 21:53

## 2023-11-06 RX ADMIN — ASPIRIN 81 MG: 81 TABLET, CHEWABLE ORAL at 10:36

## 2023-11-06 RX ADMIN — ATORVASTATIN CALCIUM 40 MG: 40 TABLET, FILM COATED ORAL at 10:36

## 2023-11-06 RX ADMIN — CEFEPIME 2000 MG: 2 INJECTION, POWDER, FOR SOLUTION INTRAVENOUS at 22:00

## 2023-11-06 RX ADMIN — HYDROCORTISONE SODIUM SUCCINATE 50 MG: 100 INJECTION, POWDER, FOR SOLUTION INTRAMUSCULAR; INTRAVENOUS at 21:52

## 2023-11-06 ASSESSMENT — PAIN SCALES - GENERAL
PAINLEVEL_OUTOF10: 0
PAINLEVEL_OUTOF10: 3

## 2023-11-06 ASSESSMENT — PAIN DESCRIPTION - DESCRIPTORS: DESCRIPTORS: ACHING

## 2023-11-06 ASSESSMENT — PAIN DESCRIPTION - ORIENTATION: ORIENTATION: LOWER

## 2023-11-06 ASSESSMENT — PAIN DESCRIPTION - LOCATION: LOCATION: BACK

## 2023-11-06 NOTE — ED NOTES
Bowden catheter placed, pt tolerated well. Pt cleaned/dried and fresh sheets placed for pt.      Timo Rosado RN  11/06/23 8194

## 2023-11-06 NOTE — ED NOTES
MD performed manual BP. Confirmed 84/40 in RUE.       Dulce Peralta RN  11/05/23 [facility], [address] [place] [address], [address] [place] [address]  11/05/23 2781

## 2023-11-06 NOTE — ED NOTES
11/6/23 4:52 AM  [phone] Hospital or Facility: [facility] From: Frank Stone RE: Mynor Counts 1950 RM: 01-01 Pt maxed on Dubutamine. Last BP 87/34. MAP 52. No other pressors ordered. See last echo re:heart function. Can pt have another pressor please?  Need Callback: NO CALLBACK REQ B3 TELEMETRY URGENT  Unread     Irasema Dudley RN  11/06/23 2570

## 2023-11-06 NOTE — ED NOTES
Report given to ICU at bedside pt transported to [facility] resp e/u on 4L NC     Kierra Escobar, [place]  11/06/23 8668

## 2023-11-06 NOTE — ED NOTES
Pt moved to Room 1 for plan for central line placement by MD, consents at bedside pt daughter at bedside to sign consents for pt.      Julieth Wylie RN  11/05/23 5358

## 2023-11-06 NOTE — ED NOTES
Multiple attempts made for 2nd peripheral IV access without success. MD aware.      Debra Giang RN  11/05/23 8917

## 2023-11-06 NOTE — ED NOTES
JASON Hawthorne notified of pt V/S, still pending admit orders from MD [facility], states will discuss with MD to clarify for tx for pt.      Glendy Green RN  11/06/23 6037

## 2023-11-06 NOTE — ED NOTES
Pt provided with chap stick per request, pt states is feeling significantly better since arrival, pt is more alert and speaking with more energy than previously, smiling while speaking with family and staff and generally in very positive mood.      Nabeel David RN  11/06/23 6801

## 2023-11-06 NOTE — ED NOTES
Report received from [facility]. Pt resting in bed on R side, oriented to self and location, reoriented to situation and time, pt NAD, resp e/u on RA with breath sounds Ronchi BL noted with and without use of a stethoscope, pt bed locked lowest position rails up x2, daughter at bedside, pt has 20g IV noted to R AC appears to be hypotensive on monitor. Pt denies any other needs or c/o at this time.      Nile Hoffman RN  11/05/23 4000

## 2023-11-06 NOTE — ED NOTES
[facility]EP [facility] DIV at bedside for amanda David, RN  11/06/23 0123

## 2023-11-06 NOTE — ED NOTES
Joann Green call this RN. Reiterated information to ICU RN. Per Joann Green, wean off of Dobutamine and start Levo. ICU RN aware.       Desiree Arambula RN  11/06/23 3176

## 2023-11-06 NOTE — ACP (ADVANCE CARE PLANNING)
Advance Care Planning     General Advance Care Planning (ACP) Conversation    Date of Conversation: 11/5/2023  Conducted with: Patient with Decision Making Capacity    Healthcare Decision Maker:    Primary Decision Maker: Berta Gerardo - [phone]    Secondary Decision Maker: Eliseo Harrison - Sean - [phone]  Click here to complete Healthcare Decision Makers including selection of the Healthcare Decision Maker Relationship (ie \"Primary\"). Today we documented Decision Maker(s) consistent with Legal Next of Kin hierarchy. Content/Action Overview:   Has ACP document(s) on file - reflects the patient's care preferences  Reviewed DNR/DNI and patient elects Full Code (Attempt Resuscitation)        Length of Voluntary ACP Conversation in minutes:  <16 minutes (Non-Billable)    Nico Andino RN

## 2023-11-06 NOTE — ED NOTES
11/6/23 5:01 AM  [phone] Hospital or Facility: [facility] From: Mitesh Brooks RE: Elbert Bronsonns 1950 RM: 01-01 Pt maxed on Dubutamine. Last BP 87/34. MAP 52. No other pressors ordered. See last echo re:heart function. Can pt have another pressor please? Need Callback: NO CALLBACK REQ B3 TELEMETRY ROUTINE  Unread    Resent message to David Dowd.      Azar Hughes RN  11/06/23 3075

## 2023-11-06 NOTE — ED PROVIDER NOTES
fraction and pulmonary edema. Given her increased BUN and creatinine the patient is given a small bolus. Patient was placed on cardiac blood pressure and pulse oximetry monitoring. Laboratory studies chest x-ray EKG all ordered. Nursing staff had difficulty obtaining a peripheral IV given the patient persistent hypotension I did decide to place a central line. Cardiac monitor as read and interpreted by myself showed initially normal sinus rhythm but while I was in the room the patient had a run of tachycardia. A rhythm strip was ordered. The rhythm strip was read and interpreted by myself appear to show sinus tachycardia. This was a very brief episode and the patient returned to her normal sinus rhythm. Central Line Placement Procedure Note    Indication: poor peripheral access and hypovolemia    Consent: The patient's daughter counseled regarding the procedure, its indications, risks, potential complications and alternatives, and any questions were answered. Consent was obtained to proceed. Procedure: The patient was positioned appropriately and the skin over the left femoral vein was prepped with chlorhexidine. Local anesthesia was obtained by infiltration using 1% Lidocaine without epinephrine. A large bore needle was used to identify the vein under ultrasound guidance. A guide wire was then inserted into the vein through the needle. This is confirmed also with ultrasound. A triple lumen catheter was then inserted into the vessel over the guide wire using the Seldinger technique. All ports showed good, free flowing blood return and were flushed with saline solution. The catheter was then securely fastened to the skin with sutures and covered with a sterile dressing. A post procedure X-ray was not indicated. The patient tolerated the procedure well.     Complications: I did initially attempt left IJ line however the patient has significant collapsibility of her internal jugular vein and
are negative. Procalcitonin is elevated at 0.48. Initially ordered cefepime and azithromycin and vancomycin antibiotics due concern for HAP. Urinalysis also shows UTI,     CT of patient's chest shows COPD with groundglass opacities on the right upper lobe and left upper lobe with reticulonodular infiltrate to the mite right middle lobe and opacities along the lung bases multisegmental bronchopneumonia along with enlarged mediastinal lymph nodes. Patients blood pressure remains low on numerous reevaluations including several manual checks. Provided with small amount of fluids initially, over several hours in the dept pt receives several bolus of iv fluids, however she remains hypotensive. I called and discussed appropriate management of this with cardiology on call, VERNON dumont who made a recommendation for pressors. Attending and myself discussed placement of a central line as nursing staff is unable to obtain appropriate peripheral access. I discussed with patient's daughter and grand daughter, who are at bedside, consented. Attending placed central line, pts BP improved initally after line placement, so we held pressor. The patient develops a temperature while in the ER and is provided with antipyretics. Consult to the hospitalist to admit the patient in fair condition. After admission prior to orders placed, pts blood pressure is again low, she has had 2L of Iv fluids at this point, do not want to administer additional fluids. Spoke with family and then hospitalist, will start dobutamine. Pt is resting comfortably on last evaluation, answered additional family questions. Pt denies any needs at this time. I am the Primary Clinician of Record. FINAL IMPRESSION      1. Altered mental status, unspecified altered mental status type    2. Acute cystitis without hematuria    3. Hospital-acquired pneumonia    4. ISI (acute kidney injury) ([address])    5.  Wound of sacral region, subsequent

## 2023-11-06 NOTE — ED NOTES
11/6/23 2:51 AM  [phone] Hospital or Facility: [facility] From: Frank Stone RE: Mynor Counts 1950 RM: 01-01 JASON De Los Santos wants to talk to [facility] re:pt care. Can you ask him to please call [phone]?  Need Callback: NO CALLBACK REQ B3 TELEMETRY URGENT  Unread      Irasema Dudley RN  11/06/23 0252      Called back at [facility]

## 2023-11-06 NOTE — ACP (ADVANCE CARE PLANNING)
Advance Care Planning     General Advance Care Planning (ACP) Conversation    Date of Conversation: 11/5/2023  Conducted with: Patient with Decision Making Capacity    Healthcare Decision Maker:    Primary Decision Maker: Breezy Lanier - [phone]    Secondary Decision Maker: Trace Estrada - [phone]  Click here to complete Healthcare Decision Makers including selection of the Healthcare Decision Maker Relationship (ie \"Primary\"). Today we documented Decision Maker(s) consistent with Legal Next of Kin hierarchy. Content/Action Overview:   Has ACP document(s) on file - reflects the patient's care preferences  Reviewed DNR/DNI and patient elects Full Code (Attempt Resuscitation)        Length of Voluntary ACP Conversation in minutes:  <16 minutes (Non-Billable)    Cheyenne Marcos RN

## 2023-11-06 NOTE — ED NOTES
11/6/23 3:40 AM  [phone] Hospital or Facility: [facility] From: Melinda Mares RE: Janice Romano 1950 RM: 01-01 Per daughter, pt wears CPAP at night. Can ICU hold pt have orders for CPAP please? Pt normally wears 2L O2, increased to 4L and SPO2 90%.  Need Callback: NO CALLBACK REQ B3 TELEMETRY ROUTINE  Unread      Maisha Ballesteros RN  11/06/23 6012

## 2023-11-06 NOTE — ED NOTES
Yadira Faith [phone] daughter requesting updates, states is going to go home to rest.     Willem Courser, [address]  11/06/23 6833

## 2023-11-06 NOTE — CARE COORDINATION
Case Management Assessment  Initial Evaluation    Date/Time of Evaluation: 11/6/2023 1:08 PM  Assessment Completed by: Marry Pablo RN    If patient is discharged prior to next notation, then this note serves as note for discharge by case management. Patient Name: Dar Yost                   YOB: 1950  Diagnosis: Hospital-acquired pneumonia [J18.9, Y95]  ISI (acute kidney injury) ([address]) [N17.9]  Acute cystitis without hematuria [N30.00]  Wound of sacral region, subsequent encounter [S31.000D]  Sepsis ([address]) [A41.9]  Altered mental status, unspecified altered mental status type [R41.82]                   Date / Time: 11/5/2023  6:08 PM    Patient Admission Status: Inpatient   Readmission Risk (Low < 19, Mod (19-27), High > 27): Readmission Risk Score: 32.5    Current PCP: Camden Ornelas MD  PCP verified by CM? Yes    Chart Reviewed: Yes      History Provided by: Patient  Patient Orientation: Alert and Oriented, Person, Place, Situation, Self    Patient Cognition: Alert    Hospitalization in the last 30 days (Readmission):  Yes    If yes, Readmission Assessment in  Navigator will be completed.     Advance Directives:      Code Status: Full Code   Patient's Primary Decision Maker is: Named in [facility]     Primary Decision MakerJulieth Way Child - [phone]    Secondary Decision Maker: Joe Sarkar - Child - [phone]    Discharge Planning:    Patient lives with: Family Members, Alone Type of Home: House  Primary Care Giver: Self  Patient Support Systems include: Children, Family Members   Current Financial resources: Medicare  Current community resources: ECF/Home Care  Current services prior to admission: Durable Medical Equipment            Current DME: Wheelchair, Walker, Oxygen Therapy (Comment)            Type of Home Care services:  OT, PT, Nursing Services    ADLS  Prior functional level: Assistance with the following:, Cooking, Housework  Current functional

## 2023-11-06 NOTE — ED NOTES
Central line placed by MD to L groin, pt tolerated well. Daughter at bedside.      Rosy Molina RN  11/06/23 0003

## 2023-11-07 LAB
ACID FAST STN SPEC QL: NORMAL
ACID FAST STN SPEC QL: NORMAL
ALBUMIN SERPL-MCNC: 2.6 G/DL (ref 3.4–5)
ALBUMIN/GLOB SERPL: 0.8 {RATIO} (ref 1.1–2.2)
ALP SERPL-CCNC: 75 U/L (ref 40–129)
ALT SERPL-CCNC: 9 U/L (ref 10–40)
ANION GAP SERPL CALCULATED.3IONS-SCNC: 12 MMOL/L (ref 3–16)
AST SERPL-CCNC: 11 U/L (ref 15–37)
BACTERIA UR CULT: ABNORMAL
BASOPHILS # BLD: 0 K/UL (ref 0–0.2)
BASOPHILS NFR BLD: 0.2 %
BILIRUB SERPL-MCNC: 0.3 MG/DL (ref 0–1)
BUN SERPL-MCNC: 27 MG/DL (ref 7–20)
CALCIUM SERPL-MCNC: 8.2 MG/DL (ref 8.3–10.6)
CHLORIDE SERPL-SCNC: 104 MMOL/L (ref 99–110)
CO2 SERPL-SCNC: 22 MMOL/L (ref 21–32)
CREAT SERPL-MCNC: 1.1 MG/DL (ref 0.6–1.2)
DEPRECATED RDW RBC AUTO: 15.8 % (ref 12.4–15.4)
EOSINOPHIL # BLD: 0 K/UL (ref 0–0.6)
EOSINOPHIL NFR BLD: 0 %
GFR SERPLBLD CREATININE-BSD FMLA CKD-EPI: 53 ML/MIN/{1.73_M2}
GLUCOSE BLD-MCNC: 120 MG/DL (ref 70–99)
GLUCOSE BLD-MCNC: 201 MG/DL (ref 70–99)
GLUCOSE BLD-MCNC: 265 MG/DL (ref 70–99)
GLUCOSE BLD-MCNC: 350 MG/DL (ref 70–99)
GLUCOSE BLD-MCNC: 377 MG/DL (ref 70–99)
GLUCOSE SERPL-MCNC: 165 MG/DL (ref 70–99)
HCT VFR BLD AUTO: 24.1 % (ref 36–48)
HGB BLD-MCNC: 8.1 G/DL (ref 12–16)
LEGIONELLA AG UR QL: NORMAL
LYMPHOCYTES # BLD: 0.3 K/UL (ref 1–5.1)
LYMPHOCYTES NFR BLD: 3.6 %
MAGNESIUM SERPL-MCNC: 2.2 MG/DL (ref 1.8–2.4)
MCH RBC QN AUTO: 29.5 PG (ref 26–34)
MCHC RBC AUTO-ENTMCNC: 33.6 G/DL (ref 31–36)
MCV RBC AUTO: 87.9 FL (ref 80–100)
MONOCYTES # BLD: 0.2 K/UL (ref 0–1.3)
MONOCYTES NFR BLD: 3 %
MYCOBACTERIUM SPEC CULT: NORMAL
MYCOBACTERIUM SPEC CULT: NORMAL
NEUTROPHILS # BLD: 6.8 K/UL (ref 1.7–7.7)
NEUTROPHILS NFR BLD: 93.2 %
ORGANISM: ABNORMAL
PERFORMED ON: ABNORMAL
PLATELET # BLD AUTO: 346 K/UL (ref 135–450)
PMV BLD AUTO: 6.8 FL (ref 5–10.5)
POTASSIUM SERPL-SCNC: 3.3 MMOL/L (ref 3.5–5.1)
PROT SERPL-MCNC: 5.9 G/DL (ref 6.4–8.2)
RBC # BLD AUTO: 2.74 M/UL (ref 4–5.2)
S PNEUM AG UR QL: NORMAL
SODIUM SERPL-SCNC: 138 MMOL/L (ref 136–145)
WBC # BLD AUTO: 7.3 K/UL (ref 4–11)

## 2023-11-07 PROCEDURE — 99233 SBSQ HOSP IP/OBS HIGH 50: CPT | Performed by: INTERNAL MEDICINE

## 2023-11-07 PROCEDURE — 80053 COMPREHEN METABOLIC PANEL: CPT

## 2023-11-07 PROCEDURE — 1200000000 HC SEMI PRIVATE

## 2023-11-07 PROCEDURE — 6370000000 HC RX 637 (ALT 250 FOR IP): Performed by: INTERNAL MEDICINE

## 2023-11-07 PROCEDURE — 87205 SMEAR GRAM STAIN: CPT

## 2023-11-07 PROCEDURE — 85025 COMPLETE CBC W/AUTO DIFF WBC: CPT

## 2023-11-07 PROCEDURE — 6360000002 HC RX W HCPCS: Performed by: INTERNAL MEDICINE

## 2023-11-07 PROCEDURE — C9113 INJ PANTOPRAZOLE SODIUM, VIA: HCPCS | Performed by: INTERNAL MEDICINE

## 2023-11-07 PROCEDURE — 6370000000 HC RX 637 (ALT 250 FOR IP): Performed by: NURSE PRACTITIONER

## 2023-11-07 PROCEDURE — 94761 N-INVAS EAR/PLS OXIMETRY MLT: CPT

## 2023-11-07 PROCEDURE — 97530 THERAPEUTIC ACTIVITIES: CPT

## 2023-11-07 PROCEDURE — 2580000003 HC RX 258: Performed by: INTERNAL MEDICINE

## 2023-11-07 PROCEDURE — 97535 SELF CARE MNGMENT TRAINING: CPT

## 2023-11-07 PROCEDURE — 87070 CULTURE OTHR SPECIMN AEROBIC: CPT

## 2023-11-07 PROCEDURE — 83735 ASSAY OF MAGNESIUM: CPT

## 2023-11-07 PROCEDURE — 94640 AIRWAY INHALATION TREATMENT: CPT

## 2023-11-07 PROCEDURE — 2700000000 HC OXYGEN THERAPY PER DAY

## 2023-11-07 RX ORDER — POTASSIUM CHLORIDE 20 MEQ/1
40 TABLET, EXTENDED RELEASE ORAL ONCE
Status: COMPLETED | OUTPATIENT
Start: 2023-11-07 | End: 2023-11-07

## 2023-11-07 RX ORDER — MORPHINE SULFATE 15 MG/1
15 TABLET, FILM COATED, EXTENDED RELEASE ORAL EVERY 12 HOURS SCHEDULED
Status: DISCONTINUED | OUTPATIENT
Start: 2023-11-07 | End: 2023-11-09 | Stop reason: HOSPADM

## 2023-11-07 RX ORDER — INSULIN GLARGINE 100 [IU]/ML
30 INJECTION, SOLUTION SUBCUTANEOUS NIGHTLY
Status: DISCONTINUED | OUTPATIENT
Start: 2023-11-08 | End: 2023-11-08

## 2023-11-07 RX ORDER — INSULIN GLARGINE 100 [IU]/ML
30 INJECTION, SOLUTION SUBCUTANEOUS ONCE
Status: COMPLETED | OUTPATIENT
Start: 2023-11-07 | End: 2023-11-07

## 2023-11-07 RX ADMIN — SODIUM CHLORIDE, PRESERVATIVE FREE 10 ML: 5 INJECTION INTRAVENOUS at 21:11

## 2023-11-07 RX ADMIN — INSULIN LISPRO 16 UNITS: 100 INJECTION, SOLUTION INTRAVENOUS; SUBCUTANEOUS at 12:28

## 2023-11-07 RX ADMIN — MORPHINE SULFATE 15 MG: 15 TABLET, FILM COATED, EXTENDED RELEASE ORAL at 20:19

## 2023-11-07 RX ADMIN — ENOXAPARIN SODIUM 40 MG: 100 INJECTION SUBCUTANEOUS at 08:24

## 2023-11-07 RX ADMIN — MUPIROCIN: 20 OINTMENT TOPICAL at 08:24

## 2023-11-07 RX ADMIN — INSULIN GLARGINE 30 UNITS: 100 INJECTION, SOLUTION SUBCUTANEOUS at 13:27

## 2023-11-07 RX ADMIN — Medication 2 PUFF: at 20:57

## 2023-11-07 RX ADMIN — Medication 2 PUFF: at 08:31

## 2023-11-07 RX ADMIN — GUAIFENESIN 600 MG: 600 TABLET ORAL at 08:25

## 2023-11-07 RX ADMIN — DULOXETINE HYDROCHLORIDE 60 MG: 60 CAPSULE, DELAYED RELEASE ORAL at 20:19

## 2023-11-07 RX ADMIN — DULOXETINE HYDROCHLORIDE 60 MG: 60 CAPSULE, DELAYED RELEASE ORAL at 08:25

## 2023-11-07 RX ADMIN — TIOTROPIUM BROMIDE INHALATION SPRAY 2 PUFF: 3.12 SPRAY, METERED RESPIRATORY (INHALATION) at 08:24

## 2023-11-07 RX ADMIN — POLYETHYLENE GLYCOL 3350 17 G: 17 POWDER, FOR SOLUTION ORAL at 08:24

## 2023-11-07 RX ADMIN — CEFEPIME 2000 MG: 2 INJECTION, POWDER, FOR SOLUTION INTRAVENOUS at 22:59

## 2023-11-07 RX ADMIN — LATANOPROST 1 DROP: 50 SOLUTION OPHTHALMIC at 22:16

## 2023-11-07 RX ADMIN — PANTOPRAZOLE SODIUM 40 MG: 40 INJECTION, POWDER, FOR SOLUTION INTRAVENOUS at 08:24

## 2023-11-07 RX ADMIN — INSULIN LISPRO 4 UNITS: 100 INJECTION, SOLUTION INTRAVENOUS; SUBCUTANEOUS at 20:18

## 2023-11-07 RX ADMIN — ATORVASTATIN CALCIUM 40 MG: 40 TABLET, FILM COATED ORAL at 08:25

## 2023-11-07 RX ADMIN — INSULIN LISPRO 4 UNITS: 100 INJECTION, SOLUTION INTRAVENOUS; SUBCUTANEOUS at 08:24

## 2023-11-07 RX ADMIN — CLOPIDOGREL BISULFATE 75 MG: 75 TABLET ORAL at 08:25

## 2023-11-07 RX ADMIN — Medication 2 PUFF: at 08:24

## 2023-11-07 RX ADMIN — GUAIFENESIN 600 MG: 600 TABLET ORAL at 20:19

## 2023-11-07 RX ADMIN — SODIUM CHLORIDE, PRESERVATIVE FREE 10 ML: 5 INJECTION INTRAVENOUS at 08:25

## 2023-11-07 RX ADMIN — CEFEPIME 2000 MG: 2 INJECTION, POWDER, FOR SOLUTION INTRAVENOUS at 08:27

## 2023-11-07 RX ADMIN — HYDROCORTISONE SODIUM SUCCINATE 50 MG: 100 INJECTION, POWDER, FOR SOLUTION INTRAMUSCULAR; INTRAVENOUS at 03:07

## 2023-11-07 RX ADMIN — SODIUM CHLORIDE: 9 INJECTION, SOLUTION INTRAVENOUS at 22:57

## 2023-11-07 RX ADMIN — ASPIRIN 81 MG: 81 TABLET, CHEWABLE ORAL at 09:41

## 2023-11-07 RX ADMIN — LATANOPROST 1 DROP: 50 SOLUTION OPHTHALMIC at 00:29

## 2023-11-07 RX ADMIN — POTASSIUM CHLORIDE 40 MEQ: 1500 TABLET, EXTENDED RELEASE ORAL at 13:27

## 2023-11-07 RX ADMIN — HYDROCORTISONE SODIUM SUCCINATE 25 MG: 100 INJECTION, POWDER, FOR SOLUTION INTRAMUSCULAR; INTRAVENOUS at 15:47

## 2023-11-07 RX ADMIN — MORPHINE SULFATE 15 MG: 15 TABLET, FILM COATED, EXTENDED RELEASE ORAL at 09:41

## 2023-11-07 RX ADMIN — ACETAMINOPHEN 650 MG: 325 TABLET ORAL at 15:46

## 2023-11-07 ASSESSMENT — PAIN DESCRIPTION - ORIENTATION
ORIENTATION: LOWER
ORIENTATION: LOWER

## 2023-11-07 ASSESSMENT — PAIN SCALES - GENERAL
PAINLEVEL_OUTOF10: 6
PAINLEVEL_OUTOF10: 9
PAINLEVEL_OUTOF10: 2

## 2023-11-07 ASSESSMENT — PAIN DESCRIPTION - LOCATION
LOCATION: BACK
LOCATION: BACK

## 2023-11-07 ASSESSMENT — PAIN DESCRIPTION - FREQUENCY: FREQUENCY: CONTINUOUS

## 2023-11-07 ASSESSMENT — PAIN DESCRIPTION - ONSET: ONSET: ON-GOING

## 2023-11-07 ASSESSMENT — PAIN DESCRIPTION - PAIN TYPE: TYPE: CHRONIC PAIN

## 2023-11-07 ASSESSMENT — PAIN DESCRIPTION - DESCRIPTORS: DESCRIPTORS: ACHING

## 2023-11-07 NOTE — H&P
History and Physical      Name:  Kareen Rasmussen /Age/Sex: 1950  (68 y.o. female)       Location:  [facility]3/0233-01 PCP: Yanira Phelan MD         Assessment and Plan:       Hospital Problems             Last Modified POA    * (Principal) Sepsis ([address]) 2023 Yes       Septic shock  Broad-spectrum IV antibiotic  Due to ischemic cardiomyopathy with low fraction use of IV fluid is limited. Has already received about 2-2.5 L in the ER. Continue with vasopressors. Initially started on dobutamine in the ER however was tapered to off while Levophed was started to be titrated. Continue to monitor on telemetry in the ICU. Blood and urine cultures are pending. Pneumonia  IV antibiotics as above. Breathing treatments as needed. We will send sputum for culture  Urine antigens for Legionella and Streptococcus. UTI  IV antibiotics as above. Urine and blood cultures are pending. ISI   Due to hypoperfusion secondary to septic shock. Continue with vasopressors. Monitor renal panel. Avoid nephrotoxic agents  Consider consultation with the nephrology. Ischemic cardiomyopathy  EF is reduced to 40%. Troponin was elevated, 55 and then 51. Continue to trend troponin. Consider consultation with cardiology if troponin continue to increase. Total critical care time excluding procedures minimum of 48 minutes  Prophylaxis regimen: See orders. History of Present Illness:     Chief Complaint(s)/Reason(s) for Admission: Sepsis [facility])  This is a pleasant 67-year-old female with history of multiple medical problems including coronary artery disease status post CABG, cardiomyopathy, COPD, DANIELLE, and recent V-fib cardiac arrest with ROSC about 2 weeks ago, was discharged home recently. She presented to ER with concerns for confusion and generalized weakness being unable to ambulate at home or stand up to transfer.   As per family members who are providing history her symptoms started as mild weakness
tablet Oral BID    mometasone-formoterol  2 puff Inhalation BID RT    And    tiotropium  2 puff Inhalation Daily RT    mupirocin   Each Nostril BID    pantoprazole  40 mg IntraVENous Daily    insulin lispro  0-16 Units SubCUTAneous TID WC    insulin lispro  0-4 Units SubCUTAneous Nightly    sodium chloride  500 mL IntraVENous Once     PRN Meds: sodium chloride flush, sodium chloride, ondansetron **OR** ondansetron, polyethylene glycol, bisacodyl, aluminum & magnesium hydroxide-simethicone, acetaminophen **OR** acetaminophen, albuterol sulfate HFA, glucose, dextrose bolus **OR** dextrose bolus, glucagon (rDNA), dextrose      Intake/Output Summary (Last 24 hours) at 11/7/2023 1307  Last data filed at 11/7/2023 8554  Gross per 24 hour   Intake 498.79 ml   Output 1365 ml   Net -866.21 ml       Labs: Personally reviewed and interpreted for clinical significance. Recent Labs     11/05/23 1910 11/06/23 0815 11/07/23 0410   WBC 9.5 9.1 7.3   HGB 8.4* 7.7* 8.1*   HCT 26.0* 23.9* 24.1*    357 346     Recent Labs     11/05/23 1910 11/06/23 0815 11/07/23 0410   * 131* 138   K 4.3 3.6 3.3*   CL 94* 96* 104   CO2 27 25 22   BUN 36* 30* 27*   CREATININE 1.8* 1.4* 1.1   CALCIUM 8.9 7.9* 8.2*     Recent Labs     11/05/23 1910 11/07/23 0410   AST 15 11*   ALT 11 9*   BILITOT 0.4 0.3   ALKPHOS 80 75     No results for input(s): \"INR\" in the last 72 hours.   Recent Labs     11/05/23 1910 11/05/23 2028   TROPHS 55* 51*       Urinalysis:      Lab Results   Component Value Date/Time    NITRU Negative 11/05/2023 08:00 PM    WBCUA  11/05/2023 08:00 PM    BACTERIA 4+ 11/05/2023 08:00 PM    RBCUA 0-2 11/05/2023 08:00 PM    BLOODU Negative 11/05/2023 08:00 PM    SPECGRAV 1.010 11/05/2023 08:00 PM    GLUCOSEU Negative 11/05/2023 08:00 PM    GLUCOSEU NEGATIVE 03/20/2010 12:52 PM       Consults:     IP CONSULT TO CARDIOLOGY  IP CONSULT TO CARDIOLOGY  PHARMACY TO DOSE VANCOMYCIN  IP CONSULT TO CRITICAL CARE    I

## 2023-11-08 LAB
ANION GAP SERPL CALCULATED.3IONS-SCNC: 13 MMOL/L (ref 3–16)
BUN SERPL-MCNC: 22 MG/DL (ref 7–20)
CALCIUM SERPL-MCNC: 8.5 MG/DL (ref 8.3–10.6)
CHLORIDE SERPL-SCNC: 100 MMOL/L (ref 99–110)
CO2 SERPL-SCNC: 21 MMOL/L (ref 21–32)
CREAT SERPL-MCNC: 1.1 MG/DL (ref 0.6–1.2)
FUNGUS SPEC CULT: ABNORMAL
FUNGUS SPEC CULT: ABNORMAL
GFR SERPLBLD CREATININE-BSD FMLA CKD-EPI: 53 ML/MIN/{1.73_M2}
GLUCOSE BLD-MCNC: 106 MG/DL (ref 70–99)
GLUCOSE BLD-MCNC: 164 MG/DL (ref 70–99)
GLUCOSE BLD-MCNC: 198 MG/DL (ref 70–99)
GLUCOSE BLD-MCNC: 312 MG/DL (ref 70–99)
GLUCOSE BLD-MCNC: 57 MG/DL (ref 70–99)
GLUCOSE BLD-MCNC: 63 MG/DL (ref 70–99)
GLUCOSE BLD-MCNC: 67 MG/DL (ref 70–99)
GLUCOSE BLD-MCNC: 78 MG/DL (ref 70–99)
GLUCOSE SERPL-MCNC: 270 MG/DL (ref 70–99)
LOEFFLER MB STN SPEC: ABNORMAL
ORGANISM: ABNORMAL
ORGANISM: ABNORMAL
PERFORMED ON: ABNORMAL
PERFORMED ON: NORMAL
POTASSIUM SERPL-SCNC: 3.9 MMOL/L (ref 3.5–5.1)
SODIUM SERPL-SCNC: 134 MMOL/L (ref 136–145)

## 2023-11-08 PROCEDURE — 6370000000 HC RX 637 (ALT 250 FOR IP): Performed by: INTERNAL MEDICINE

## 2023-11-08 PROCEDURE — 6370000000 HC RX 637 (ALT 250 FOR IP): Performed by: NURSE PRACTITIONER

## 2023-11-08 PROCEDURE — 94761 N-INVAS EAR/PLS OXIMETRY MLT: CPT

## 2023-11-08 PROCEDURE — 2700000000 HC OXYGEN THERAPY PER DAY

## 2023-11-08 PROCEDURE — 6360000002 HC RX W HCPCS: Performed by: INTERNAL MEDICINE

## 2023-11-08 PROCEDURE — C9113 INJ PANTOPRAZOLE SODIUM, VIA: HCPCS | Performed by: INTERNAL MEDICINE

## 2023-11-08 PROCEDURE — 2580000003 HC RX 258: Performed by: INTERNAL MEDICINE

## 2023-11-08 PROCEDURE — 99233 SBSQ HOSP IP/OBS HIGH 50: CPT | Performed by: INTERNAL MEDICINE

## 2023-11-08 PROCEDURE — 36415 COLL VENOUS BLD VENIPUNCTURE: CPT

## 2023-11-08 PROCEDURE — 94640 AIRWAY INHALATION TREATMENT: CPT

## 2023-11-08 PROCEDURE — 80048 BASIC METABOLIC PNL TOTAL CA: CPT

## 2023-11-08 PROCEDURE — 1200000000 HC SEMI PRIVATE

## 2023-11-08 RX ORDER — INSULIN GLARGINE 100 [IU]/ML
25 INJECTION, SOLUTION SUBCUTANEOUS ONCE
Status: COMPLETED | OUTPATIENT
Start: 2023-11-08 | End: 2023-11-08

## 2023-11-08 RX ORDER — CALCIUM CARBONATE 500 MG/1
500 TABLET, CHEWABLE ORAL 3 TIMES DAILY PRN
Status: DISCONTINUED | OUTPATIENT
Start: 2023-11-08 | End: 2023-11-09 | Stop reason: HOSPADM

## 2023-11-08 RX ORDER — LORAZEPAM 0.5 MG/1
0.5 TABLET ORAL EVERY 4 HOURS PRN
Status: DISCONTINUED | OUTPATIENT
Start: 2023-11-08 | End: 2023-11-09 | Stop reason: HOSPADM

## 2023-11-08 RX ORDER — INSULIN GLARGINE 100 [IU]/ML
15 INJECTION, SOLUTION SUBCUTANEOUS 2 TIMES DAILY
Status: DISCONTINUED | OUTPATIENT
Start: 2023-11-08 | End: 2023-11-09 | Stop reason: HOSPADM

## 2023-11-08 RX ORDER — CALCIUM CARBONATE 500 MG/1
1 TABLET, CHEWABLE ORAL EVERY 6 HOURS PRN
COMMUNITY

## 2023-11-08 RX ORDER — METOPROLOL SUCCINATE 25 MG/1
25 TABLET, EXTENDED RELEASE ORAL DAILY
Status: DISCONTINUED | OUTPATIENT
Start: 2023-11-08 | End: 2023-11-09 | Stop reason: HOSPADM

## 2023-11-08 RX ADMIN — SODIUM CHLORIDE, PRESERVATIVE FREE 10 ML: 5 INJECTION INTRAVENOUS at 09:18

## 2023-11-08 RX ADMIN — ANTACID TABLETS 500 MG: 500 TABLET, CHEWABLE ORAL at 04:58

## 2023-11-08 RX ADMIN — GUAIFENESIN 600 MG: 600 TABLET ORAL at 09:05

## 2023-11-08 RX ADMIN — HYDROCORTISONE SODIUM SUCCINATE 25 MG: 100 INJECTION, POWDER, FOR SOLUTION INTRAMUSCULAR; INTRAVENOUS at 04:20

## 2023-11-08 RX ADMIN — PANTOPRAZOLE SODIUM 40 MG: 40 INJECTION, POWDER, FOR SOLUTION INTRAVENOUS at 09:13

## 2023-11-08 RX ADMIN — SODIUM CHLORIDE, PRESERVATIVE FREE 10 ML: 5 INJECTION INTRAVENOUS at 21:24

## 2023-11-08 RX ADMIN — CEFTRIAXONE SODIUM 1000 MG: 1 INJECTION, POWDER, FOR SOLUTION INTRAMUSCULAR; INTRAVENOUS at 11:59

## 2023-11-08 RX ADMIN — MORPHINE SULFATE 15 MG: 15 TABLET, FILM COATED, EXTENDED RELEASE ORAL at 21:24

## 2023-11-08 RX ADMIN — CEFEPIME 2000 MG: 2 INJECTION, POWDER, FOR SOLUTION INTRAVENOUS at 09:13

## 2023-11-08 RX ADMIN — Medication 2 PUFF: at 20:42

## 2023-11-08 RX ADMIN — LATANOPROST 1 DROP: 50 SOLUTION OPHTHALMIC at 21:31

## 2023-11-08 RX ADMIN — MORPHINE SULFATE 15 MG: 15 TABLET, FILM COATED, EXTENDED RELEASE ORAL at 09:05

## 2023-11-08 RX ADMIN — SODIUM CHLORIDE: 9 INJECTION, SOLUTION INTRAVENOUS at 09:12

## 2023-11-08 RX ADMIN — SACUBITRIL AND VALSARTAN 1 TABLET: 24; 26 TABLET, FILM COATED ORAL at 21:24

## 2023-11-08 RX ADMIN — ASPIRIN 81 MG: 81 TABLET, CHEWABLE ORAL at 09:05

## 2023-11-08 RX ADMIN — DULOXETINE HYDROCHLORIDE 60 MG: 60 CAPSULE, DELAYED RELEASE ORAL at 21:24

## 2023-11-08 RX ADMIN — Medication 2 PUFF: at 04:26

## 2023-11-08 RX ADMIN — ATORVASTATIN CALCIUM 40 MG: 40 TABLET, FILM COATED ORAL at 09:05

## 2023-11-08 RX ADMIN — GUAIFENESIN 600 MG: 600 TABLET ORAL at 21:24

## 2023-11-08 RX ADMIN — CLOPIDOGREL BISULFATE 75 MG: 75 TABLET ORAL at 09:05

## 2023-11-08 RX ADMIN — METOPROLOL SUCCINATE 25 MG: 25 TABLET, EXTENDED RELEASE ORAL at 11:56

## 2023-11-08 RX ADMIN — Medication 2 PUFF: at 07:55

## 2023-11-08 RX ADMIN — ENOXAPARIN SODIUM 40 MG: 100 INJECTION SUBCUTANEOUS at 09:05

## 2023-11-08 RX ADMIN — TIOTROPIUM BROMIDE INHALATION SPRAY 2 PUFF: 3.12 SPRAY, METERED RESPIRATORY (INHALATION) at 07:55

## 2023-11-08 RX ADMIN — SODIUM CHLORIDE: 9 INJECTION, SOLUTION INTRAVENOUS at 11:58

## 2023-11-08 RX ADMIN — DULOXETINE HYDROCHLORIDE 60 MG: 60 CAPSULE, DELAYED RELEASE ORAL at 09:05

## 2023-11-08 RX ADMIN — LORAZEPAM 0.5 MG: 0.5 TABLET ORAL at 22:35

## 2023-11-08 RX ADMIN — INSULIN GLARGINE 25 UNITS: 100 INJECTION, SOLUTION SUBCUTANEOUS at 12:02

## 2023-11-08 RX ADMIN — INSULIN LISPRO 12 UNITS: 100 INJECTION, SOLUTION INTRAVENOUS; SUBCUTANEOUS at 09:13

## 2023-11-08 ASSESSMENT — PAIN DESCRIPTION - LOCATION
LOCATION: BACK
LOCATION: BACK

## 2023-11-08 ASSESSMENT — PAIN DESCRIPTION - ORIENTATION
ORIENTATION: MID;LOWER
ORIENTATION: MID;LOWER

## 2023-11-08 ASSESSMENT — PAIN SCALES - GENERAL
PAINLEVEL_OUTOF10: 5
PAINLEVEL_OUTOF10: 5
PAINLEVEL_OUTOF10: 10

## 2023-11-08 ASSESSMENT — PAIN SCALES - WONG BAKER: WONGBAKER_NUMERICALRESPONSE: 0

## 2023-11-08 NOTE — PLAN OF CARE
Problem: Discharge Planning  Goal: Discharge to home or other facility with appropriate resources  Outcome: Progressing  Flowsheets (Taken 11/7/2023 1931)  Discharge to home or other facility with appropriate resources: Identify barriers to discharge with patient and caregiver     Problem: Safety - Adult  Goal: Free from fall injury  Outcome: Progressing     Problem: Chronic Conditions and Co-morbidities  Goal: Patient's chronic conditions and co-morbidity symptoms are monitored and maintained or improved  Outcome: Progressing  Flowsheets (Taken 11/7/2023 1931)  Care Plan - Patient's Chronic Conditions and Co-Morbidity Symptoms are Monitored and Maintained or Improved: Monitor and assess patient's chronic conditions and comorbid symptoms for stability, deterioration, or improvement     Problem: ABCDS Injury Assessment  Goal: Absence of physical injury  Outcome: Progressing     Problem: Pain  Goal: Verbalizes/displays adequate comfort level or baseline comfort level  Outcome: Progressing  Flowsheets (Taken 11/7/2023 2015)  Verbalizes/displays adequate comfort level or baseline comfort level: Encourage patient to monitor pain and request assistance     Problem: Nutrition Deficit:  Goal: Optimize nutritional status  Outcome: Progressing

## 2023-11-08 NOTE — CARE COORDINATION
Cm met with pt at bedside. CM discussed PT/OT recs. Pt states she does not want SNF. She lives with her daughter who is an RN and works from home. Pt plans to resume home care with [facility]. Pt has O2 with [employer].

## 2023-11-08 NOTE — PLAN OF CARE
Problem: Safety - Adult  Goal: Free from fall injury  11/8/2023 1044 by Elda Paris RN  Outcome: Progressing  11/8/2023 0141 by Walter Stinson RN  Outcome: Progressing

## 2023-11-09 VITALS
HEART RATE: 90 BPM | BODY MASS INDEX: 29.35 KG/M2 | TEMPERATURE: 98.5 F | DIASTOLIC BLOOD PRESSURE: 65 MMHG | RESPIRATION RATE: 18 BRPM | WEIGHT: 187 LBS | SYSTOLIC BLOOD PRESSURE: 121 MMHG | HEIGHT: 67 IN | OXYGEN SATURATION: 94 %

## 2023-11-09 LAB
ANION GAP SERPL CALCULATED.3IONS-SCNC: 11 MMOL/L (ref 3–16)
BACTERIA BLD CULT ORG #2: NORMAL
BACTERIA BLD CULT: NORMAL
BACTERIA SPEC RESP CULT: NORMAL
BUN SERPL-MCNC: 16 MG/DL (ref 7–20)
CALCIUM SERPL-MCNC: 8.3 MG/DL (ref 8.3–10.6)
CHLORIDE SERPL-SCNC: 103 MMOL/L (ref 99–110)
CO2 SERPL-SCNC: 22 MMOL/L (ref 21–32)
CREAT SERPL-MCNC: 0.9 MG/DL (ref 0.6–1.2)
GFR SERPLBLD CREATININE-BSD FMLA CKD-EPI: >60 ML/MIN/{1.73_M2}
GLUCOSE BLD-MCNC: 114 MG/DL (ref 70–99)
GLUCOSE BLD-MCNC: 56 MG/DL (ref 70–99)
GLUCOSE BLD-MCNC: 61 MG/DL (ref 70–99)
GLUCOSE BLD-MCNC: 66 MG/DL (ref 70–99)
GLUCOSE BLD-MCNC: 87 MG/DL (ref 70–99)
GLUCOSE BLD-MCNC: 98 MG/DL (ref 70–99)
GLUCOSE SERPL-MCNC: 75 MG/DL (ref 70–99)
GRAM STN SPEC: NORMAL
PERFORMED ON: ABNORMAL
PERFORMED ON: NORMAL
PERFORMED ON: NORMAL
POTASSIUM SERPL-SCNC: 3.7 MMOL/L (ref 3.5–5.1)
SODIUM SERPL-SCNC: 136 MMOL/L (ref 136–145)

## 2023-11-09 PROCEDURE — 36415 COLL VENOUS BLD VENIPUNCTURE: CPT

## 2023-11-09 PROCEDURE — 6370000000 HC RX 637 (ALT 250 FOR IP): Performed by: INTERNAL MEDICINE

## 2023-11-09 PROCEDURE — 99232 SBSQ HOSP IP/OBS MODERATE 35: CPT | Performed by: INTERNAL MEDICINE

## 2023-11-09 PROCEDURE — 97535 SELF CARE MNGMENT TRAINING: CPT

## 2023-11-09 PROCEDURE — 97530 THERAPEUTIC ACTIVITIES: CPT

## 2023-11-09 PROCEDURE — C9113 INJ PANTOPRAZOLE SODIUM, VIA: HCPCS | Performed by: INTERNAL MEDICINE

## 2023-11-09 PROCEDURE — 6360000002 HC RX W HCPCS: Performed by: INTERNAL MEDICINE

## 2023-11-09 PROCEDURE — 2580000003 HC RX 258: Performed by: INTERNAL MEDICINE

## 2023-11-09 PROCEDURE — 97110 THERAPEUTIC EXERCISES: CPT

## 2023-11-09 PROCEDURE — 6370000000 HC RX 637 (ALT 250 FOR IP): Performed by: NURSE PRACTITIONER

## 2023-11-09 PROCEDURE — 80048 BASIC METABOLIC PNL TOTAL CA: CPT

## 2023-11-09 PROCEDURE — 94640 AIRWAY INHALATION TREATMENT: CPT

## 2023-11-09 RX ORDER — TORSEMIDE 20 MG/1
20 TABLET ORAL DAILY
Qty: 1 TABLET | Refills: 0 | Status: SHIPPED
Start: 2023-11-09

## 2023-11-09 RX ORDER — CEFUROXIME AXETIL 500 MG/1
500 TABLET ORAL 2 TIMES DAILY
Qty: 10 TABLET | Refills: 0 | Status: SHIPPED | OUTPATIENT
Start: 2023-11-09 | End: 2023-11-14

## 2023-11-09 RX ORDER — PREDNISONE 10 MG/1
10 TABLET ORAL DAILY
Status: DISCONTINUED | OUTPATIENT
Start: 2023-11-09 | End: 2023-11-09 | Stop reason: HOSPADM

## 2023-11-09 RX ORDER — PREDNISONE 10 MG/1
10 TABLET ORAL DAILY
Qty: 1 TABLET | Refills: 0 | COMMUNITY
Start: 2023-11-09

## 2023-11-09 RX ADMIN — PANTOPRAZOLE SODIUM 40 MG: 40 INJECTION, POWDER, FOR SOLUTION INTRAVENOUS at 09:07

## 2023-11-09 RX ADMIN — GUAIFENESIN 600 MG: 600 TABLET ORAL at 09:07

## 2023-11-09 RX ADMIN — TIOTROPIUM BROMIDE INHALATION SPRAY 2 PUFF: 3.12 SPRAY, METERED RESPIRATORY (INHALATION) at 07:44

## 2023-11-09 RX ADMIN — SODIUM CHLORIDE, PRESERVATIVE FREE 10 ML: 5 INJECTION INTRAVENOUS at 09:19

## 2023-11-09 RX ADMIN — SODIUM CHLORIDE 25 ML: 9 INJECTION, SOLUTION INTRAVENOUS at 13:54

## 2023-11-09 RX ADMIN — PREDNISONE 10 MG: 10 TABLET ORAL at 09:07

## 2023-11-09 RX ADMIN — MORPHINE SULFATE 15 MG: 15 TABLET, FILM COATED, EXTENDED RELEASE ORAL at 09:07

## 2023-11-09 RX ADMIN — ATORVASTATIN CALCIUM 40 MG: 40 TABLET, FILM COATED ORAL at 09:07

## 2023-11-09 RX ADMIN — CLOPIDOGREL BISULFATE 75 MG: 75 TABLET ORAL at 09:07

## 2023-11-09 RX ADMIN — Medication 2 PUFF: at 07:44

## 2023-11-09 RX ADMIN — DULOXETINE HYDROCHLORIDE 60 MG: 60 CAPSULE, DELAYED RELEASE ORAL at 09:07

## 2023-11-09 RX ADMIN — SACUBITRIL AND VALSARTAN 1 TABLET: 24; 26 TABLET, FILM COATED ORAL at 09:07

## 2023-11-09 RX ADMIN — LORAZEPAM 0.5 MG: 0.5 TABLET ORAL at 02:37

## 2023-11-09 RX ADMIN — ASPIRIN 81 MG: 81 TABLET, CHEWABLE ORAL at 09:07

## 2023-11-09 RX ADMIN — ENOXAPARIN SODIUM 40 MG: 100 INJECTION SUBCUTANEOUS at 09:11

## 2023-11-09 RX ADMIN — CEFTRIAXONE SODIUM 1000 MG: 1 INJECTION, POWDER, FOR SOLUTION INTRAMUSCULAR; INTRAVENOUS at 13:55

## 2023-11-09 RX ADMIN — METOPROLOL SUCCINATE 25 MG: 25 TABLET, EXTENDED RELEASE ORAL at 09:07

## 2023-11-09 ASSESSMENT — PAIN SCALES - WONG BAKER
WONGBAKER_NUMERICALRESPONSE: 0
WONGBAKER_NUMERICALRESPONSE: 0

## 2023-11-09 ASSESSMENT — PAIN DESCRIPTION - ORIENTATION: ORIENTATION: MID;LOWER

## 2023-11-09 ASSESSMENT — PAIN DESCRIPTION - DESCRIPTORS: DESCRIPTORS: ACHING

## 2023-11-09 ASSESSMENT — PAIN SCALES - GENERAL: PAINLEVEL_OUTOF10: 3

## 2023-11-09 ASSESSMENT — PAIN DESCRIPTION - LOCATION: LOCATION: BACK

## 2023-11-09 NOTE — HOME CARE
Isha Rdz will require the following home care treatments or therapies: Skilled Nursing, vital signs, medication compliance and education, PT/OT, wound care, teaching and management of medical conditions, etc.  Home care will be necessary because of CHF and deconditioning. The patient is in agreement to receiving home care.

## 2023-11-09 NOTE — CARE COORDINATION
CASE MANAGEMENT DISCHARGE SUMMARY      Discharge to: home with home care         IMM given: (date) 11/9/23         Transportation:    Family/car: daughter        Confirmed discharge plan with:     Patient: yes      Family:  yes- daughter at bedside   Facility/Agency, name:  [address] home care for PT/OT and [address] faxed         RN, name: Fatimah Hanley

## 2023-11-09 NOTE — PLAN OF CARE
Problem: Discharge Planning  Goal: Discharge to home or other facility with appropriate resources  Outcome: Progressing     Problem: Safety - Adult  Goal: Free from fall injury  Outcome: Progressing     Problem: Chronic Conditions and Co-morbidities  Goal: Patient's chronic conditions and co-morbidity symptoms are monitored and maintained or improved  Outcome: Progressing     Problem: ABCDS Injury Assessment  Goal: Absence of physical injury  Outcome: Progressing     Problem: Pain  Goal: Verbalizes/displays adequate comfort level or baseline comfort level  Outcome: Progressing     Problem: Nutrition Deficit:  Goal: Optimize nutritional status  Outcome: Progressing     Problem: Skin/Tissue Integrity  Goal: Absence of new skin breakdown  Description: 1. Monitor for areas of redness and/or skin breakdown  2. Assess vascular access sites hourly  3. Every 4-6 hours minimum:  Change oxygen saturation probe site  4. Every 4-6 hours:  If on nasal continuous positive airway pressure, respiratory therapy assess nares and determine need for appliance change or resting period.   Outcome: Progressing

## 2023-11-09 NOTE — DISCHARGE SUMMARY
Hospital Medicine Progress Note      Date of Admission: 11/5/2023  Hospital Day: 5    Chief Admission Complaint:  Confusion, weakness     Subjective:  Reports she was anxious and trouble sleeping last night. BS above goal.     Presenting Admission History: This is a pleasant 55-year-old female with history of multiple medical problems including coronary artery disease status post CABG, cardiomyopathy, COPD, DANIELLE, and recent V-fib cardiac arrest with ROSC about 2 weeks ago, was discharged home recently. She presented to ER with concerns for confusion and generalized weakness being unable to ambulate at home or stand up to transfer. On further evaluation in the ER the patient was found to be hypotensive and febrile with temperature of 101, hypotensive. \"    Assessment/Plan:      Current Principal Problem:  Septic shock ([address])    Septic shock, due to possible pneumonia and/or E.coli acute cystitis. Weaned off pressor. Tapered stress dose steroids; resume home dose of Prednisone 10 mg daily. Changed to Rocephin. ISI, due to above. Monitor response to IVFs. Recent STEMI, now with demand ischemia from sepsis. Medical management with aspirin, clopidogrel, statin. Appreciate cardiology input. Chronic CHF. Meds adjusted per Cardiology. Entresto on hold due to ISI. Recent Vfib cardiac arrest.  She will need to put her LifeVest back on when she leaves here. DM2. Insulin regimen adjusted while on stress dose steroids. Will adjust back to home dose of Lantus 15 units BID. Labile sugars at baseline; her daughter is well versed in managing her Insulin doses. COPD. Inhaled bronchodilators. Chronically on steroids. Physical Exam Performed:      General appearance:  No apparent distress  Respiratory:  Normal respiratory effort. Cardiovascular:  Regular rate and rhythm. Abdomen:  Soft, non-tender, non-distended.   Musculoskelatal:  No edema  Neurologic:  Non-focal  Psychiatric:  Alert

## 2023-11-09 NOTE — DISCHARGE INSTR - COC
Drainage Amount Scant (moist but unmeasurable) 23   Drainage Description Serous 23   Odor None 23   Blanca-wound Assessment Maceration 23 1128   Margins Unattached edges 23   Wound Thickness Description not for Pressure Injury Full thickness 23   Number of days: 3        Elimination:  Continence: Bowel: {YES / OV:69630}  Bladder: {YES / KJ:98709}  Urinary Catheter: {Urinary Catheter:137542309}   Colostomy/Ileostomy/Ileal Conduit: {YES / WM:17323}       Date of Last BM: ***    Intake/Output Summary (Last 24 hours) at 2023 1506  Last data filed at 2023 1633  Gross per 24 hour   Intake --   Output 200 ml   Net -200 ml     I/O last 3 completed shifts:   In: 1120 [P.O.:1120]  Out: 200 [Urine:200]    Safety Concerns:     [address] [place] [address] [employer] Safety Concerns:232786441}    Impairments/Disabilities:      [address] [place] [address] Impairments/Disabilities:256944346}    Nutrition Therapy:  Current Nutrition Therapy:   [address] [place] [address] [employer] Diet List:780030905}    Routes of Feeding: {CHP DME Other Feedings:358628592}  Liquids: {Slp liquid thickness:79675}  Daily Fluid Restriction: {CHP DME Yes amt example:924424061}  Last Modified Barium Swallow with Video (Video Swallowing Test): {Done Not Done XJSQ:911737818}    Treatments at the Time of Hospital Discharge:   Respiratory Treatments: ***  Oxygen Therapy:  {Therapy; copd oxygen:88334}  Ventilator:    { CC Vent RWGJ:192924795}    Rehab Therapies: {THERAPEUTIC INTERVENTION:1232694726}  Weight Bearing Status/Restrictions: [address] [facility] Weight Bearin}  Other Medical Equipment (for information only, NOT a DME order):  {EQUIPMENT:649201733}  Other Treatments: ***    Patient's personal belongings (please select all that are sent with patient):  {CHP DME Belongings:849319196}    RN SIGNATURE:  {Esignature:893625484}    CASE MANAGEMENT/SOCIAL WORK SECTION    Inpatient Status Date: ***    Readmission Risk Assessment Score:  Readmission Risk

## 2023-11-09 NOTE — PLAN OF CARE
Problem: Discharge Planning  Goal: Discharge to home or other facility with appropriate resources  11/9/2023 1545 by Kelly Yost RN  Outcome: Adequate for Discharge  11/9/2023 1545 by Kelly Yost RN  Outcome: Adequate for Discharge  11/9/2023 1202 by Kelly Yost RN  Outcome: Progressing     Problem: Safety - Adult  Goal: Free from fall injury  11/9/2023 1545 by Kelly Yost RN  Outcome: Adequate for Discharge  11/9/2023 1202 by Kelly Yost RN  Outcome: Progressing     Problem: Chronic Conditions and Co-morbidities  Goal: Patient's chronic conditions and co-morbidity symptoms are monitored and maintained or improved  11/9/2023 1545 by Kelly Yost RN  Outcome: Adequate for Discharge  11/9/2023 1202 by Kelly Yost RN  Outcome: Progressing     Problem: ABCDS Injury Assessment  Goal: Absence of physical injury  11/9/2023 1545 by Kelly Yost RN  Outcome: Adequate for Discharge  11/9/2023 1202 by Kelly Yost RN  Outcome: Progressing     Problem: Pain  Goal: Verbalizes/displays adequate comfort level or baseline comfort level  11/9/2023 1545 by Kelly Yost RN  Outcome: Adequate for Discharge  11/9/2023 1202 by Kelly Yost RN  Outcome: Progressing     Problem: Nutrition Deficit:  Goal: Optimize nutritional status  11/9/2023 1545 by Kelly Yost RN  Outcome: Adequate for Discharge  11/9/2023 1202 by Kelly Yost RN  Outcome: Progressing     Problem: Skin/Tissue Integrity  Goal: Absence of new skin breakdown  Description: 1. Monitor for areas of redness and/or skin breakdown  2. Assess vascular access sites hourly  3. Every 4-6 hours minimum:  Change oxygen saturation probe site  4. Every 4-6 hours:  If on nasal continuous positive airway pressure, respiratory therapy assess nares and determine need for appliance change or resting period.   11/9/2023 1545 by Kelly Yost RN  Outcome: Adequate for Discharge  11/9/2023 1202 by Kelly Yost

## 2023-11-10 LAB
FUNGUS SPEC CULT: ABNORMAL
LOEFFLER MB STN SPEC: ABNORMAL
ORGANISM: ABNORMAL

## 2023-11-13 LAB
FUNGUS SPEC CULT: ABNORMAL
LOEFFLER MB STN SPEC: ABNORMAL
LOEFFLER MB STN SPEC: ABNORMAL
ORGANISM: ABNORMAL

## 2023-11-14 ENCOUNTER — TELEPHONE (OUTPATIENT)
Dept: CASE MANAGEMENT | Age: 73
End: 2023-11-14

## 2023-11-14 NOTE — TELEPHONE ENCOUNTER
Imaging report CT Chest 11/5/23 with f/u imaging recommendations sent to Luis Alfredo Quinn MD    [address] [place] [address]([email or web address]

## 2023-11-16 ASSESSMENT — ENCOUNTER SYMPTOMS: SHORTNESS OF BREATH: 1

## 2023-11-16 NOTE — PROGRESS NOTES
[facility]   Cardiology Note              Date:  November 16, 2023  Patientname: Danny Perry  YOB: 1950    Primary Care physician: Juvenal Miller MD    HISTORY OF PRESENT ILLNESS: Danny Perry is a 68 y.o. female with a history of CAD, VF arrest, HFrEF, ischemic cardiomyopathy, MR, PVD, DM, DANIELLE, HLD, sacral ulcer, COPD, RA. Complex medical history as below:    - She had right fem-pop bypass 9/2017. - She was admitted 4/2021 for chest pain and troponin elevated. LHC  4/26/2021 showed severe CAD. Echo showed EF 55%. On 5/3/2021 she had CABG x3, BIMAL clip. She required evacuation of hematoma 5/5/2021.   - She was admitted 8/10/2021 for shortness of breath and treated for CHF. Echo showed EF 50%, possibly severe MR. LHC 8/16/2021 showed occluded SVG-OM, suspect severe MR due to papillary dysfunction r/t occluded RCA/LCx. - ABHISHEK 10/2021 showed EF 30%, mild MR, +grade 1 pulmonary AV malformation. - Echo 3/2022 showed EF 30-35%, moderate MR.   - Echo 8/2022 showed EF 50-55%, mild-moderate MR, mild AI, moderate TR.   - Admitted 8/5/2023 for SOB and weight gain. Treated for acute on chronic HFrEF, ISI felt to be cardiorenal syndrome, COPD. - Admitted to [facility] 10/16/2023 for adrenal crisis, sepsis and was transferred to [facility] 10/19/2023 for chest pain, elevated troponin, NSTEMI. After arrival to [facility], she had VT/VF arrest, CPR begun, spontaneously converted, no defibrillation. Echo showed EF 40%. [facility] 10/20 showed severe native CAD, patent LIMA-LAD and SVG-RPLB, occluded SVG-OM, ostial LCx now  with collaterals present, progressive diagonal disease, medical management recommended upfront, consider PCI distal LM/diagonal pending clinical course. Discharged with Claudia Ovalle, ICD felt to be high risk with infection/chronic ulcer.   - Readmitted 11/2023 for pneumonia, septic shock, ISI. Today she presents for hospital follow up for HFrEF, VF arrest, NSTEMI. Overall she feels well.  She is

## 2023-11-20 NOTE — PROCEDURES
CARDIAC CATHETERIZATION REPORT    Date of Procedure: 10/20/23  : Al Boo DO  Primary Indication: V-fib arrest, NSTEMI, CAD, ischemic cardiomyopathy    Procedures Performed:  1. Coronary angiography  2. Left subclavian angiography  3. LIMA angiography  4. Saphenous vein graft angiography  5. Left heart catheterization  6. Right heart catheterization  7. Ultrasound-guided right femoral artery access  8. Ultrasound-guided left femoral vein access  9. Right femoral angiography  10. Moderate conscious sedation    Procedural Details:  Access: Local anesthetic was given and access was obtained in the right femoral artery using a micropuncture technique and ultrasound guidance and a 6F sheath was placed without difficulty. Access was also obtained in the right femoral vein using a micropuncture technique and ultrasound guidance and a 5F sheath was placed without difficulty. Diagnostic: A 5F Rema Mcgovern catheter was used to perform the right heart catheterization. 5F JR4 and 5F JL4.5 catheters were used to perform selective right and left coronary angiography, respectively. The 5F JR4 catheter was used to perform saphenous vein graft angiography and left subclavian angiography. A 5F ANDRÉS catheter was used to perform LIMA angiography. The 5F JR4 catheter was used to perform the left heart catheterization. No significant gradient was observed on pull-back of the catheter across the aortic valve. Hemostasis: At the end of the procedure, the right femoral arterial sheath was removed and manual pressure applied to maintain hemostasis. The right femoral venous sheath was removed and manual pressure applied to maintain hemostasis. Findings:  Hemodynamics:  A.  Right heart catheterization                   1. RA: 12 mmHg                   2. RV: 52/12 mmHg                   3. PA: 52/28 (38) mmHg                   4. PCWP: 27 mmHg                   5. Saturations: RA 50%, PA 46%, AO 88%                   6.

## 2023-11-22 ENCOUNTER — OFFICE VISIT (OUTPATIENT)
Dept: CARDIOLOGY CLINIC | Age: 73
End: 2023-11-22
Payer: MEDICARE

## 2023-11-22 VITALS
HEART RATE: 72 BPM | BODY MASS INDEX: 28.88 KG/M2 | OXYGEN SATURATION: 95 % | SYSTOLIC BLOOD PRESSURE: 100 MMHG | WEIGHT: 184 LBS | HEIGHT: 67 IN | DIASTOLIC BLOOD PRESSURE: 40 MMHG

## 2023-11-22 DIAGNOSIS — I25.5 ISCHEMIC CARDIOMYOPATHY: ICD-10-CM

## 2023-11-22 DIAGNOSIS — I25.10 CORONARY ARTERY DISEASE INVOLVING NATIVE CORONARY ARTERY OF NATIVE HEART WITHOUT ANGINA PECTORIS: Primary | ICD-10-CM

## 2023-11-22 PROCEDURE — 1123F ACP DISCUSS/DSCN MKR DOCD: CPT | Performed by: NURSE PRACTITIONER

## 2023-11-22 PROCEDURE — 99214 OFFICE O/P EST MOD 30 MIN: CPT | Performed by: NURSE PRACTITIONER

## 2023-11-22 PROCEDURE — 3074F SYST BP LT 130 MM HG: CPT | Performed by: NURSE PRACTITIONER

## 2023-11-22 PROCEDURE — 3078F DIAST BP <80 MM HG: CPT | Performed by: NURSE PRACTITIONER

## 2023-11-22 RX ORDER — SPIRONOLACTONE 25 MG/1
12.5 TABLET ORAL DAILY
COMMUNITY

## 2023-11-22 ASSESSMENT — ENCOUNTER SYMPTOMS: COUGH: 0

## 2023-11-22 NOTE — PATIENT INSTRUCTIONS
Everything looks great today, good job!   Would like to start jardiance or farxiga for heart strength if ok with Dr. Maribell Lomeli, will defer to him  Continue other medications  Ok to take an additional torsemide for swelling, weight gain or shortness of breath  Call with spironolactone dosing  Heart ultrasound prior to seeing Dr. Ildefonso Marinelli  Follow up with Dr. Shields Sayres

## 2023-11-30 DIAGNOSIS — I25.700 CORONARY ARTERY DISEASE INVOLVING CORONARY BYPASS GRAFT OF NATIVE HEART WITH UNSTABLE ANGINA PECTORIS (HCC): Chronic | ICD-10-CM

## 2023-11-30 DIAGNOSIS — I50.23 ACUTE ON CHRONIC HFREF (HEART FAILURE WITH REDUCED EJECTION FRACTION) (HCC): Primary | ICD-10-CM

## 2023-11-30 RX ORDER — LANOLIN ALCOHOL/MO/W.PET/CERES
400 CREAM (GRAM) TOPICAL DAILY
Qty: 30 TABLET | Refills: 3 | Status: SHIPPED | OUTPATIENT
Start: 2023-11-30

## 2023-11-30 RX ORDER — ASPIRIN 81 MG/1
81 TABLET, CHEWABLE ORAL DAILY
Qty: 30 TABLET | Refills: 3 | Status: SHIPPED | OUTPATIENT
Start: 2023-11-30

## 2023-11-30 NOTE — TELEPHONE ENCOUNTER
Called and spoke to Jaelyn the pt's Daughter, shani per HIPAA. Relayed NPLR message. Pt is using [address] stated that Dr. Laure Toscano did not prescribe Jardiance.

## 2023-11-30 NOTE — TELEPHONE ENCOUNTER
Great, lets also have her start jardiance 10 mg daily if not already prescribed by Dr. Corina Barry. Thanks!

## 2023-11-30 NOTE — TELEPHONE ENCOUNTER
Patient called stating she got the okay to start [address] from Dr. Yomi Celaya. She was also needing refills for ASA and Magnesium as they were sent to the wrong pharmacy.

## 2023-12-06 PROBLEM — R79.89 ELEVATED TROPONIN: Status: RESOLVED | Noted: 2023-11-06 | Resolved: 2023-12-06

## 2023-12-29 ENCOUNTER — APPOINTMENT (OUTPATIENT)
Dept: GENERAL RADIOLOGY | Age: 73
DRG: 871 | End: 2023-12-29
Payer: MEDICARE

## 2023-12-29 ENCOUNTER — HOSPITAL ENCOUNTER (INPATIENT)
Age: 73
LOS: 9 days | Discharge: HOME HEALTH | DRG: 871 | End: 2024-01-07
Attending: STUDENT IN AN ORGANIZED HEALTH CARE EDUCATION/TRAINING PROGRAM | Admitting: STUDENT IN AN ORGANIZED HEALTH CARE EDUCATION/TRAINING PROGRAM
Payer: MEDICARE

## 2023-12-29 DIAGNOSIS — U07.1 COVID: Primary | ICD-10-CM

## 2023-12-29 DIAGNOSIS — A41.9 SEPTIC SHOCK (HCC): ICD-10-CM

## 2023-12-29 DIAGNOSIS — R65.21 SEPTIC SHOCK (HCC): ICD-10-CM

## 2023-12-29 DIAGNOSIS — U07.1 COVID-19: ICD-10-CM

## 2023-12-29 DIAGNOSIS — I50.22 CHRONIC SYSTOLIC CONGESTIVE HEART FAILURE (HCC): ICD-10-CM

## 2023-12-29 PROBLEM — R65.20 SEVERE SEPSIS (HCC): Status: ACTIVE | Noted: 2023-12-29

## 2023-12-29 LAB
ALBUMIN SERPL-MCNC: 3.6 G/DL (ref 3.4–5)
ALBUMIN/GLOB SERPL: 0.9 {RATIO} (ref 1.1–2.2)
ALP SERPL-CCNC: 98 U/L (ref 40–129)
ALT SERPL-CCNC: 13 U/L (ref 10–40)
ANION GAP SERPL CALCULATED.3IONS-SCNC: 12 MMOL/L (ref 3–16)
AST SERPL-CCNC: 15 U/L (ref 15–37)
BACTERIA URNS QL MICRO: ABNORMAL /HPF
BASOPHILS # BLD: 0.1 K/UL (ref 0–0.2)
BASOPHILS NFR BLD: 0.9 %
BILIRUB SERPL-MCNC: <0.2 MG/DL (ref 0–1)
BILIRUB UR QL STRIP.AUTO: NEGATIVE
BUN SERPL-MCNC: 28 MG/DL (ref 7–20)
CALCIUM SERPL-MCNC: 8.6 MG/DL (ref 8.3–10.6)
CHLORIDE SERPL-SCNC: 92 MMOL/L (ref 99–110)
CLARITY UR: CLEAR
CO2 SERPL-SCNC: 26 MMOL/L (ref 21–32)
COLOR UR: ABNORMAL
CREAT SERPL-MCNC: 1.4 MG/DL (ref 0.6–1.2)
DEPRECATED RDW RBC AUTO: 16 % (ref 12.4–15.4)
EKG ATRIAL RATE: 91 BPM
EKG DIAGNOSIS: NORMAL
EKG P AXIS: 58 DEGREES
EKG P-R INTERVAL: 160 MS
EKG Q-T INTERVAL: 388 MS
EKG QRS DURATION: 108 MS
EKG QTC CALCULATION (BAZETT): 477 MS
EKG R AXIS: -33 DEGREES
EKG T AXIS: 68 DEGREES
EKG VENTRICULAR RATE: 91 BPM
EOSINOPHIL # BLD: 0.2 K/UL (ref 0–0.6)
EOSINOPHIL NFR BLD: 3.1 %
FLUAV RNA RESP QL NAA+PROBE: NOT DETECTED
FLUBV RNA RESP QL NAA+PROBE: NOT DETECTED
GFR SERPLBLD CREATININE-BSD FMLA CKD-EPI: 40 ML/MIN/{1.73_M2}
GLUCOSE BLD-MCNC: 75 MG/DL (ref 70–99)
GLUCOSE SERPL-MCNC: 206 MG/DL (ref 70–99)
GLUCOSE UR STRIP.AUTO-MCNC: >=1000 MG/DL
HCT VFR BLD AUTO: 32.8 % (ref 36–48)
HGB BLD-MCNC: 10.5 G/DL (ref 12–16)
HGB UR QL STRIP.AUTO: ABNORMAL
KETONES UR STRIP.AUTO-MCNC: NEGATIVE MG/DL
LACTATE BLDV-SCNC: 1.6 MMOL/L (ref 0.4–1.9)
LACTATE BLDV-SCNC: 2.5 MMOL/L (ref 0.4–1.9)
LEUKOCYTE ESTERASE UR QL STRIP.AUTO: NEGATIVE
LYMPHOCYTES # BLD: 1 K/UL (ref 1–5.1)
LYMPHOCYTES NFR BLD: 14.4 %
MCH RBC QN AUTO: 27.9 PG (ref 26–34)
MCHC RBC AUTO-ENTMCNC: 32.1 G/DL (ref 31–36)
MCV RBC AUTO: 87 FL (ref 80–100)
MONOCYTES # BLD: 0.4 K/UL (ref 0–1.3)
MONOCYTES NFR BLD: 5.8 %
NEUTROPHILS # BLD: 5.3 K/UL (ref 1.7–7.7)
NEUTROPHILS NFR BLD: 75.8 %
NITRITE UR QL STRIP.AUTO: NEGATIVE
NT-PROBNP SERPL-MCNC: 2542 PG/ML (ref 0–124)
PERFORMED ON: NORMAL
PH UR STRIP.AUTO: 6 [PH] (ref 5–8)
PLATELET # BLD AUTO: 272 K/UL (ref 135–450)
PMV BLD AUTO: 7.5 FL (ref 5–10.5)
POTASSIUM SERPL-SCNC: 4.2 MMOL/L (ref 3.5–5.1)
PROCALCITONIN SERPL IA-MCNC: 0.15 NG/ML (ref 0–0.15)
PROT SERPL-MCNC: 7.5 G/DL (ref 6.4–8.2)
PROT UR STRIP.AUTO-MCNC: NEGATIVE MG/DL
RBC # BLD AUTO: 3.77 M/UL (ref 4–5.2)
RBC #/AREA URNS HPF: ABNORMAL /HPF (ref 0–4)
SARS-COV-2 RNA RESP QL NAA+PROBE: DETECTED
SODIUM SERPL-SCNC: 130 MMOL/L (ref 136–145)
SP GR UR STRIP.AUTO: 1.01 (ref 1–1.03)
TROPONIN, HIGH SENSITIVITY: 29 NG/L (ref 0–14)
TROPONIN, HIGH SENSITIVITY: 36 NG/L (ref 0–14)
UA COMPLETE W REFLEX CULTURE PNL UR: ABNORMAL
UA DIPSTICK W REFLEX MICRO PNL UR: YES
URN SPEC COLLECT METH UR: ABNORMAL
UROBILINOGEN UR STRIP-ACNC: 0.2 E.U./DL
WBC # BLD AUTO: 7 K/UL (ref 4–11)
WBC #/AREA URNS HPF: ABNORMAL /HPF (ref 0–5)

## 2023-12-29 PROCEDURE — 96361 HYDRATE IV INFUSION ADD-ON: CPT

## 2023-12-29 PROCEDURE — 6360000002 HC RX W HCPCS: Performed by: INTERNAL MEDICINE

## 2023-12-29 PROCEDURE — 2700000000 HC OXYGEN THERAPY PER DAY

## 2023-12-29 PROCEDURE — 99291 CRITICAL CARE FIRST HOUR: CPT | Performed by: INTERNAL MEDICINE

## 2023-12-29 PROCEDURE — 94761 N-INVAS EAR/PLS OXIMETRY MLT: CPT

## 2023-12-29 PROCEDURE — 36556 INSERT NON-TUNNEL CV CATH: CPT | Performed by: INTERNAL MEDICINE

## 2023-12-29 PROCEDURE — 96374 THER/PROPH/DIAG INJ IV PUSH: CPT

## 2023-12-29 PROCEDURE — 87636 SARSCOV2 & INF A&B AMP PRB: CPT

## 2023-12-29 PROCEDURE — 2000000000 HC ICU R&B

## 2023-12-29 PROCEDURE — 6370000000 HC RX 637 (ALT 250 FOR IP): Performed by: PHYSICIAN ASSISTANT

## 2023-12-29 PROCEDURE — 02HV33Z INSERTION OF INFUSION DEVICE INTO SUPERIOR VENA CAVA, PERCUTANEOUS APPROACH: ICD-10-PCS | Performed by: INTERNAL MEDICINE

## 2023-12-29 PROCEDURE — 83605 ASSAY OF LACTIC ACID: CPT

## 2023-12-29 PROCEDURE — 6370000000 HC RX 637 (ALT 250 FOR IP): Performed by: STUDENT IN AN ORGANIZED HEALTH CARE EDUCATION/TRAINING PROGRAM

## 2023-12-29 PROCEDURE — 2580000003 HC RX 258: Performed by: INTERNAL MEDICINE

## 2023-12-29 PROCEDURE — 2500000003 HC RX 250 WO HCPCS: Performed by: STUDENT IN AN ORGANIZED HEALTH CARE EDUCATION/TRAINING PROGRAM

## 2023-12-29 PROCEDURE — 71045 X-RAY EXAM CHEST 1 VIEW: CPT

## 2023-12-29 PROCEDURE — 93005 ELECTROCARDIOGRAM TRACING: CPT | Performed by: PHYSICIAN ASSISTANT

## 2023-12-29 PROCEDURE — 93010 ELECTROCARDIOGRAM REPORT: CPT | Performed by: INTERNAL MEDICINE

## 2023-12-29 PROCEDURE — 83880 ASSAY OF NATRIURETIC PEPTIDE: CPT

## 2023-12-29 PROCEDURE — 80053 COMPREHEN METABOLIC PANEL: CPT

## 2023-12-29 PROCEDURE — 6360000002 HC RX W HCPCS: Performed by: STUDENT IN AN ORGANIZED HEALTH CARE EDUCATION/TRAINING PROGRAM

## 2023-12-29 PROCEDURE — 84145 PROCALCITONIN (PCT): CPT

## 2023-12-29 PROCEDURE — 2580000003 HC RX 258: Performed by: STUDENT IN AN ORGANIZED HEALTH CARE EDUCATION/TRAINING PROGRAM

## 2023-12-29 PROCEDURE — P9047 ALBUMIN (HUMAN), 25%, 50ML: HCPCS | Performed by: STUDENT IN AN ORGANIZED HEALTH CARE EDUCATION/TRAINING PROGRAM

## 2023-12-29 PROCEDURE — 99285 EMERGENCY DEPT VISIT HI MDM: CPT

## 2023-12-29 PROCEDURE — 96375 TX/PRO/DX INJ NEW DRUG ADDON: CPT

## 2023-12-29 PROCEDURE — 87040 BLOOD CULTURE FOR BACTERIA: CPT

## 2023-12-29 PROCEDURE — 87641 MR-STAPH DNA AMP PROBE: CPT

## 2023-12-29 PROCEDURE — 2580000003 HC RX 258: Performed by: PHYSICIAN ASSISTANT

## 2023-12-29 PROCEDURE — 6370000000 HC RX 637 (ALT 250 FOR IP): Performed by: INTERNAL MEDICINE

## 2023-12-29 PROCEDURE — 87449 NOS EACH ORGANISM AG IA: CPT

## 2023-12-29 PROCEDURE — 81001 URINALYSIS AUTO W/SCOPE: CPT

## 2023-12-29 PROCEDURE — 84443 ASSAY THYROID STIM HORMONE: CPT

## 2023-12-29 PROCEDURE — 84484 ASSAY OF TROPONIN QUANT: CPT

## 2023-12-29 PROCEDURE — 85025 COMPLETE CBC W/AUTO DIFF WBC: CPT

## 2023-12-29 RX ORDER — METOPROLOL SUCCINATE 25 MG/1
25 TABLET, EXTENDED RELEASE ORAL DAILY
Status: DISCONTINUED | OUTPATIENT
Start: 2023-12-29 | End: 2024-01-07 | Stop reason: HOSPADM

## 2023-12-29 RX ORDER — ATORVASTATIN CALCIUM 40 MG/1
40 TABLET, FILM COATED ORAL NIGHTLY
Status: DISCONTINUED | OUTPATIENT
Start: 2023-12-29 | End: 2024-01-07 | Stop reason: HOSPADM

## 2023-12-29 RX ORDER — FLUDROCORTISONE ACETATE 0.1 MG/1
0.2 TABLET ORAL DAILY
Status: DISCONTINUED | OUTPATIENT
Start: 2023-12-29 | End: 2023-12-29

## 2023-12-29 RX ORDER — 0.9 % SODIUM CHLORIDE 0.9 %
1000 INTRAVENOUS SOLUTION INTRAVENOUS ONCE
Status: DISCONTINUED | OUTPATIENT
Start: 2023-12-29 | End: 2024-01-07 | Stop reason: HOSPADM

## 2023-12-29 RX ORDER — DEXTROSE MONOHYDRATE 25 G/50ML
50 INJECTION, SOLUTION INTRAVENOUS ONCE
Status: COMPLETED | OUTPATIENT
Start: 2023-12-29 | End: 2023-12-29

## 2023-12-29 RX ORDER — POLYETHYLENE GLYCOL 3350 17 G/17G
17 POWDER, FOR SOLUTION ORAL DAILY PRN
Status: DISCONTINUED | OUTPATIENT
Start: 2023-12-29 | End: 2024-01-07 | Stop reason: HOSPADM

## 2023-12-29 RX ORDER — 0.9 % SODIUM CHLORIDE 0.9 %
500 INTRAVENOUS SOLUTION INTRAVENOUS ONCE
Status: COMPLETED | OUTPATIENT
Start: 2023-12-29 | End: 2023-12-29

## 2023-12-29 RX ORDER — GUAIFENESIN/DEXTROMETHORPHAN 100-10MG/5
5 SYRUP ORAL EVERY 4 HOURS PRN
Status: DISCONTINUED | OUTPATIENT
Start: 2023-12-29 | End: 2024-01-06

## 2023-12-29 RX ORDER — GLUCAGON 1 MG/ML
1 KIT INJECTION PRN
Status: DISCONTINUED | OUTPATIENT
Start: 2023-12-29 | End: 2024-01-07 | Stop reason: HOSPADM

## 2023-12-29 RX ORDER — OXYCODONE AND ACETAMINOPHEN 10; 325 MG/1; MG/1
1 TABLET ORAL DAILY
Status: DISCONTINUED | OUTPATIENT
Start: 2023-12-29 | End: 2024-01-04

## 2023-12-29 RX ORDER — MORPHINE SULFATE 15 MG/1
15 TABLET, FILM COATED, EXTENDED RELEASE ORAL 2 TIMES DAILY
Status: DISCONTINUED | OUTPATIENT
Start: 2023-12-29 | End: 2024-01-07 | Stop reason: HOSPADM

## 2023-12-29 RX ORDER — SODIUM CHLORIDE 9 MG/ML
INJECTION, SOLUTION INTRAVENOUS CONTINUOUS
Status: ACTIVE | OUTPATIENT
Start: 2023-12-29 | End: 2023-12-30

## 2023-12-29 RX ORDER — INSULIN LISPRO 100 [IU]/ML
0-4 INJECTION, SOLUTION INTRAVENOUS; SUBCUTANEOUS
Status: DISCONTINUED | OUTPATIENT
Start: 2023-12-29 | End: 2024-01-02

## 2023-12-29 RX ORDER — ONDANSETRON 2 MG/ML
4 INJECTION INTRAMUSCULAR; INTRAVENOUS EVERY 6 HOURS PRN
Status: DISCONTINUED | OUTPATIENT
Start: 2023-12-29 | End: 2024-01-07 | Stop reason: HOSPADM

## 2023-12-29 RX ORDER — METHYLPREDNISOLONE SODIUM SUCCINATE 125 MG/2ML
125 INJECTION, POWDER, LYOPHILIZED, FOR SOLUTION INTRAMUSCULAR; INTRAVENOUS ONCE
Status: COMPLETED | OUTPATIENT
Start: 2023-12-29 | End: 2023-12-29

## 2023-12-29 RX ORDER — FLUDROCORTISONE ACETATE 0.1 MG/1
0.1 TABLET ORAL DAILY
Status: DISCONTINUED | OUTPATIENT
Start: 2023-12-30 | End: 2024-01-07 | Stop reason: HOSPADM

## 2023-12-29 RX ORDER — INSULIN LISPRO 100 [IU]/ML
0-4 INJECTION, SOLUTION INTRAVENOUS; SUBCUTANEOUS NIGHTLY
Status: DISCONTINUED | OUTPATIENT
Start: 2023-12-29 | End: 2024-01-02

## 2023-12-29 RX ORDER — SODIUM CHLORIDE 9 MG/ML
INJECTION, SOLUTION INTRAVENOUS PRN
Status: DISCONTINUED | OUTPATIENT
Start: 2023-12-29 | End: 2024-01-07 | Stop reason: HOSPADM

## 2023-12-29 RX ORDER — ENOXAPARIN SODIUM 100 MG/ML
40 INJECTION SUBCUTANEOUS DAILY
Status: DISCONTINUED | OUTPATIENT
Start: 2023-12-30 | End: 2024-01-07 | Stop reason: HOSPADM

## 2023-12-29 RX ORDER — SODIUM CHLORIDE 0.9 % (FLUSH) 0.9 %
5-40 SYRINGE (ML) INJECTION PRN
Status: DISCONTINUED | OUTPATIENT
Start: 2023-12-29 | End: 2024-01-07 | Stop reason: HOSPADM

## 2023-12-29 RX ORDER — SODIUM CHLORIDE 0.9 % (FLUSH) 0.9 %
5-40 SYRINGE (ML) INJECTION EVERY 12 HOURS SCHEDULED
Status: DISCONTINUED | OUTPATIENT
Start: 2023-12-29 | End: 2024-01-07 | Stop reason: HOSPADM

## 2023-12-29 RX ORDER — 0.9 % SODIUM CHLORIDE 0.9 %
1000 INTRAVENOUS SOLUTION INTRAVENOUS ONCE
Status: COMPLETED | OUTPATIENT
Start: 2023-12-29 | End: 2023-12-29

## 2023-12-29 RX ORDER — MIDODRINE HYDROCHLORIDE 5 MG/1
10 TABLET ORAL ONCE
Status: COMPLETED | OUTPATIENT
Start: 2023-12-29 | End: 2023-12-29

## 2023-12-29 RX ORDER — PANTOPRAZOLE SODIUM 40 MG/1
40 TABLET, DELAYED RELEASE ORAL
Status: DISCONTINUED | OUTPATIENT
Start: 2023-12-30 | End: 2024-01-07 | Stop reason: HOSPADM

## 2023-12-29 RX ORDER — ASPIRIN 81 MG/1
81 TABLET, CHEWABLE ORAL DAILY
Status: DISCONTINUED | OUTPATIENT
Start: 2023-12-30 | End: 2024-01-07 | Stop reason: HOSPADM

## 2023-12-29 RX ORDER — SODIUM CHLORIDE, SODIUM LACTATE, POTASSIUM CHLORIDE, AND CALCIUM CHLORIDE .6; .31; .03; .02 G/100ML; G/100ML; G/100ML; G/100ML
1000 INJECTION, SOLUTION INTRAVENOUS ONCE
Status: COMPLETED | OUTPATIENT
Start: 2023-12-29 | End: 2023-12-30

## 2023-12-29 RX ORDER — ROFLUMILAST 500 UG/1
500 TABLET ORAL DAILY
Status: DISCONTINUED | OUTPATIENT
Start: 2023-12-30 | End: 2024-01-07 | Stop reason: HOSPADM

## 2023-12-29 RX ORDER — ACETAMINOPHEN 650 MG/1
650 SUPPOSITORY RECTAL EVERY 6 HOURS PRN
Status: DISCONTINUED | OUTPATIENT
Start: 2023-12-29 | End: 2024-01-07 | Stop reason: HOSPADM

## 2023-12-29 RX ORDER — ONDANSETRON 4 MG/1
4 TABLET, ORALLY DISINTEGRATING ORAL EVERY 8 HOURS PRN
Status: DISCONTINUED | OUTPATIENT
Start: 2023-12-29 | End: 2024-01-07 | Stop reason: HOSPADM

## 2023-12-29 RX ORDER — ACETAMINOPHEN 325 MG/1
650 TABLET ORAL EVERY 6 HOURS PRN
Status: DISCONTINUED | OUTPATIENT
Start: 2023-12-29 | End: 2024-01-07 | Stop reason: HOSPADM

## 2023-12-29 RX ORDER — DEXAMETHASONE SODIUM PHOSPHATE 10 MG/ML
6 INJECTION INTRAMUSCULAR; INTRAVENOUS EVERY 12 HOURS
Status: DISCONTINUED | OUTPATIENT
Start: 2023-12-29 | End: 2023-12-31

## 2023-12-29 RX ORDER — ACETAMINOPHEN 500 MG
1000 TABLET ORAL ONCE
Status: COMPLETED | OUTPATIENT
Start: 2023-12-29 | End: 2023-12-29

## 2023-12-29 RX ORDER — GABAPENTIN 300 MG/1
300 CAPSULE ORAL 2 TIMES DAILY
Status: DISCONTINUED | OUTPATIENT
Start: 2023-12-29 | End: 2024-01-07 | Stop reason: HOSPADM

## 2023-12-29 RX ORDER — CLOPIDOGREL BISULFATE 75 MG/1
75 TABLET ORAL DAILY
Status: DISCONTINUED | OUTPATIENT
Start: 2023-12-30 | End: 2024-01-07 | Stop reason: HOSPADM

## 2023-12-29 RX ORDER — GUAIFENESIN 600 MG/1
600 TABLET, EXTENDED RELEASE ORAL 2 TIMES DAILY
Status: DISCONTINUED | OUTPATIENT
Start: 2023-12-29 | End: 2024-01-07 | Stop reason: HOSPADM

## 2023-12-29 RX ORDER — TORSEMIDE 20 MG/1
20 TABLET ORAL DAILY
Status: DISCONTINUED | OUTPATIENT
Start: 2023-12-29 | End: 2024-01-07 | Stop reason: HOSPADM

## 2023-12-29 RX ORDER — SPIRONOLACTONE 25 MG/1
12.5 TABLET ORAL DAILY
Status: DISCONTINUED | OUTPATIENT
Start: 2023-12-29 | End: 2024-01-07 | Stop reason: HOSPADM

## 2023-12-29 RX ORDER — DEXTROSE MONOHYDRATE 100 MG/ML
INJECTION, SOLUTION INTRAVENOUS CONTINUOUS PRN
Status: DISCONTINUED | OUTPATIENT
Start: 2023-12-29 | End: 2024-01-07 | Stop reason: HOSPADM

## 2023-12-29 RX ORDER — DULOXETIN HYDROCHLORIDE 60 MG/1
60 CAPSULE, DELAYED RELEASE ORAL 2 TIMES DAILY
Status: DISCONTINUED | OUTPATIENT
Start: 2023-12-30 | End: 2024-01-07 | Stop reason: HOSPADM

## 2023-12-29 RX ORDER — ALBUMIN (HUMAN) 12.5 G/50ML
25 SOLUTION INTRAVENOUS ONCE
Status: COMPLETED | OUTPATIENT
Start: 2023-12-29 | End: 2023-12-29

## 2023-12-29 RX ADMIN — SODIUM CHLORIDE, POTASSIUM CHLORIDE, SODIUM LACTATE AND CALCIUM CHLORIDE 1000 ML: 600; 310; 30; 20 INJECTION, SOLUTION INTRAVENOUS at 21:15

## 2023-12-29 RX ADMIN — ACETAMINOPHEN 650 MG: 325 TABLET ORAL at 21:13

## 2023-12-29 RX ADMIN — MIDODRINE HYDROCHLORIDE 10 MG: 5 TABLET ORAL at 16:56

## 2023-12-29 RX ADMIN — METHYLPREDNISOLONE SODIUM SUCCINATE 125 MG: 125 INJECTION INTRAMUSCULAR; INTRAVENOUS at 15:09

## 2023-12-29 RX ADMIN — SODIUM CHLORIDE 500 ML: 9 INJECTION, SOLUTION INTRAVENOUS at 14:02

## 2023-12-29 RX ADMIN — GUAIFENESIN AND DEXTROMETHORPHAN 5 ML: 100; 10 SYRUP ORAL at 21:13

## 2023-12-29 RX ADMIN — FLUDROCORTISONE ACETATE 0.2 MG: 0.1 TABLET ORAL at 16:21

## 2023-12-29 RX ADMIN — SODIUM CHLORIDE 1000 ML: 9 INJECTION, SOLUTION INTRAVENOUS at 15:08

## 2023-12-29 RX ADMIN — ONDANSETRON 4 MG: 4 TABLET, ORALLY DISINTEGRATING ORAL at 21:13

## 2023-12-29 RX ADMIN — DEXAMETHASONE SODIUM PHOSPHATE 6 MG: 10 INJECTION INTRAMUSCULAR; INTRAVENOUS at 21:16

## 2023-12-29 RX ADMIN — ACETAMINOPHEN 1000 MG: 500 TABLET ORAL at 12:33

## 2023-12-29 RX ADMIN — GUAIFENESIN 600 MG: 600 TABLET ORAL at 21:16

## 2023-12-29 RX ADMIN — DEXTROSE MONOHYDRATE 50 ML: 25 INJECTION, SOLUTION INTRAVENOUS at 15:58

## 2023-12-29 RX ADMIN — GABAPENTIN 300 MG: 300 CAPSULE ORAL at 21:14

## 2023-12-29 RX ADMIN — ALBUMIN (HUMAN) 25 G: 0.25 INJECTION, SOLUTION INTRAVENOUS at 21:05

## 2023-12-29 RX ADMIN — ATORVASTATIN CALCIUM 40 MG: 40 TABLET, FILM COATED ORAL at 21:13

## 2023-12-29 RX ADMIN — SODIUM CHLORIDE, PRESERVATIVE FREE 10 ML: 5 INJECTION INTRAVENOUS at 21:17

## 2023-12-29 RX ADMIN — SODIUM CHLORIDE 5 MCG/MIN: 9 INJECTION, SOLUTION INTRAVENOUS at 18:41

## 2023-12-29 RX ADMIN — SODIUM CHLORIDE: 9 INJECTION, SOLUTION INTRAVENOUS at 21:10

## 2023-12-29 RX ADMIN — CEFEPIME 2000 MG: 2 INJECTION, POWDER, FOR SOLUTION INTRAVENOUS at 17:10

## 2023-12-29 RX ADMIN — VANCOMYCIN HYDROCHLORIDE 1500 MG: 10 INJECTION, POWDER, LYOPHILIZED, FOR SOLUTION INTRAVENOUS at 17:58

## 2023-12-29 ASSESSMENT — PAIN DESCRIPTION - PAIN TYPE
TYPE: CHRONIC PAIN
TYPE: CHRONIC PAIN

## 2023-12-29 ASSESSMENT — PAIN SCALES - GENERAL: PAINLEVEL_OUTOF10: 10

## 2023-12-29 ASSESSMENT — PAIN - FUNCTIONAL ASSESSMENT: PAIN_FUNCTIONAL_ASSESSMENT: 0-10

## 2023-12-29 NOTE — ED PROVIDER NOTES
18:43,No significant change was found       I spoke with Dr. Lee. We discussed the history, physical exam, diagnostics, as well as their emergency department course. Based upon that discussion, we've decided to admit Gris Taveras for further observation and evaluation of Gris Taveras's   1. COVID    .          I am the Primary Clinician of Record.    FINAL IMPRESSION      1. COVID          DISPOSITION/PLAN     DISPOSITION Admitted 12/29/2023 04:50:59 PM      PATIENT REFERRED TO:  No follow-up provider specified.    DISCHARGE MEDICATIONS:  New Prescriptions    No medications on file       DISCONTINUED MEDICATIONS:  Discontinued Medications    No medications on file              (Please note that portions of this note were completed with a voice recognition program.  Efforts were made to edit the dictations but occasionally words are mis-transcribed.)    JASON Jackson (electronically signed)           Sheela Riggs PA  12/29/23 9083

## 2023-12-29 NOTE — ED NOTES
Pt came in today with c/o extreme exhausted daughter reports that other family members state that pt had some AMS seeing people in the car talking getting up in the night several different times. Pt has a heart defibrillator from home attached to her. Pt also is a diabetic and have glucose monitor attached. Pt had some low glucose and got a amp of D50 IVP. Pt also had some vomiting throughout the night at home. Pt lives home alone pt with a hx of Brockway's disease. Pt with urine INC and pt also with ulcer on her buttocks that was very red pt was cath for urine which was sent pt with coarse cough pt test positive for COVID this visit. Pt normally on RA pt with desats with sleeping pt placed on 3 lit oxygen per [place]. Pt medicated as ordered. Including ABX. Per daughter pt with a hx of hypotension however pt with increase hypotension than norm pt getting medicated. Patient identified as a positive fall risk on the ED triage fall screening.  Patient placed in fall precautions which includes:  yellow fall risk bracelet on wrist and yellow socks on feet. Patient instructed on importance of not getting out of bed or ambulating without assistance for safety.  Pt verbalized understanding.

## 2023-12-29 NOTE — H&P
V2.0  History and Physical      Name:  Gris Taveras /Age/Sex: 1950  (73 y.o. female)   MRN & CSN:  [id number] & [id number] Encounter Date/Time: 2023 4:51 PM EST   Location:   PCP: Armando Burroughs MD       Hospital Day: 1      History of Present Illness:     Chief Complaint: fatigue    Gris Taveras is a 73 y.o. female with PMH of CAD s/p CABG, cardiomyopathy, and recent V-fib cardiac arrest with ROSC, Bronchiectasis, Tracheomalacia, Rheumatoid arthritis,  COPD, DANIELLE who presents with fatigue, and confusion. Pt was also noted to have new cough. She also reports vomiting this morning. Pt was having some halluncations as well. Pt is compliant with her medications including the prednisone.    At ED, pt was febrile, SBP was dropping reaching 60's; on room air. Labs Na 130, K 4.2, Cr 1.4, glucose 206 -> 75, WBC 7.0, Hgb 10.5.  Covid-19 positive. Received 1.5L IVF NS, solu-medrol 125 mg. Blood pressure was still in the 80's even on manual check. Discussed with ED, will admit pt to ICU  for shock  workup and management.       Assessment and Plan:   Shock likely septic 2/2 Covid-19; suspect adrenal crisis..  Came w fever 101, tachycardia 90's, RR 20's. Pt has hx of adrenal insufficiency.   Pt is immunocompromised on Abatacept   - will start empiric abx (vanc + cefepime) until bacterial infection is ruled out  - start midodrine   - give dose of albumin  - steroids  - levophed ordered   - order blood cxX2  - IVF 12 hours    Covid-19 pneumonia. Pt on room air. Has cough. CXR changes suggestive of Covid-19  - on steroids as above  - supportive care    ISI. Cr 1.4  (baseline 0.9-1.0). likely pre-renal from hypotension.  - management as above  - bladder scan q shift  - avoid nephrotoxins  - intake/output    Type II diabetes. On home Lantus 15U BID, Lispro sliding scale .   Glucose dropped 75 from 140 on admission.   - hypoglycemia protocol  - LDSSI  - resume home Lantus once glucose numbers

## 2023-12-30 LAB
ALBUMIN SERPL-MCNC: 3.2 G/DL (ref 3.4–5)
ANION GAP SERPL CALCULATED.3IONS-SCNC: 12 MMOL/L (ref 3–16)
BASOPHILS # BLD: 0 K/UL (ref 0–0.2)
BASOPHILS NFR BLD: 0.1 %
BUN SERPL-MCNC: 29 MG/DL (ref 7–20)
CALCIUM SERPL-MCNC: 8.3 MG/DL (ref 8.3–10.6)
CHLORIDE SERPL-SCNC: 101 MMOL/L (ref 99–110)
CO2 SERPL-SCNC: 24 MMOL/L (ref 21–32)
CREAT SERPL-MCNC: 1.3 MG/DL (ref 0.6–1.2)
DEPRECATED RDW RBC AUTO: 16.2 % (ref 12.4–15.4)
EOSINOPHIL # BLD: 0 K/UL (ref 0–0.6)
EOSINOPHIL NFR BLD: 0 %
GFR SERPLBLD CREATININE-BSD FMLA CKD-EPI: 44 ML/MIN/{1.73_M2}
GLUCOSE BLD-MCNC: 192 MG/DL (ref 70–99)
GLUCOSE BLD-MCNC: 214 MG/DL (ref 70–99)
GLUCOSE BLD-MCNC: 232 MG/DL (ref 70–99)
GLUCOSE BLD-MCNC: 248 MG/DL (ref 70–99)
GLUCOSE BLD-MCNC: 257 MG/DL (ref 70–99)
GLUCOSE BLD-MCNC: 319 MG/DL (ref 70–99)
GLUCOSE SERPL-MCNC: 259 MG/DL (ref 70–99)
HCT VFR BLD AUTO: 30.7 % (ref 36–48)
HGB BLD-MCNC: 9.8 G/DL (ref 12–16)
LYMPHOCYTES # BLD: 0.7 K/UL (ref 1–5.1)
LYMPHOCYTES NFR BLD: 8.5 %
MAGNESIUM SERPL-MCNC: 2.2 MG/DL (ref 1.8–2.4)
MCH RBC QN AUTO: 27.6 PG (ref 26–34)
MCHC RBC AUTO-ENTMCNC: 32 G/DL (ref 31–36)
MCV RBC AUTO: 86.2 FL (ref 80–100)
MONOCYTES # BLD: 0.2 K/UL (ref 0–1.3)
MONOCYTES NFR BLD: 2.4 %
NEUTROPHILS # BLD: 6.8 K/UL (ref 1.7–7.7)
NEUTROPHILS NFR BLD: 89 %
PERFORMED ON: ABNORMAL
PHOSPHATE SERPL-MCNC: 4 MG/DL (ref 2.5–4.9)
PLATELET # BLD AUTO: 266 K/UL (ref 135–450)
PMV BLD AUTO: 7.5 FL (ref 5–10.5)
POTASSIUM SERPL-SCNC: 4 MMOL/L (ref 3.5–5.1)
RBC # BLD AUTO: 3.56 M/UL (ref 4–5.2)
SODIUM SERPL-SCNC: 137 MMOL/L (ref 136–145)
TSH SERPL DL<=0.005 MIU/L-ACNC: 1.4 UIU/ML (ref 0.27–4.2)
VANCOMYCIN SERPL-MCNC: 14.1 UG/ML
WBC # BLD AUTO: 7.6 K/UL (ref 4–11)

## 2023-12-30 PROCEDURE — 2700000000 HC OXYGEN THERAPY PER DAY

## 2023-12-30 PROCEDURE — 6370000000 HC RX 637 (ALT 250 FOR IP): Performed by: INTERNAL MEDICINE

## 2023-12-30 PROCEDURE — 86403 PARTICLE AGGLUT ANTBDY SCRN: CPT

## 2023-12-30 PROCEDURE — 87186 SC STD MICRODIL/AGAR DIL: CPT

## 2023-12-30 PROCEDURE — 6370000000 HC RX 637 (ALT 250 FOR IP): Performed by: STUDENT IN AN ORGANIZED HEALTH CARE EDUCATION/TRAINING PROGRAM

## 2023-12-30 PROCEDURE — 6360000002 HC RX W HCPCS: Performed by: INTERNAL MEDICINE

## 2023-12-30 PROCEDURE — 99233 SBSQ HOSP IP/OBS HIGH 50: CPT | Performed by: INTERNAL MEDICINE

## 2023-12-30 PROCEDURE — 6360000002 HC RX W HCPCS: Performed by: STUDENT IN AN ORGANIZED HEALTH CARE EDUCATION/TRAINING PROGRAM

## 2023-12-30 PROCEDURE — 94761 N-INVAS EAR/PLS OXIMETRY MLT: CPT

## 2023-12-30 PROCEDURE — 80202 ASSAY OF VANCOMYCIN: CPT

## 2023-12-30 PROCEDURE — 87070 CULTURE OTHR SPECIMN AEROBIC: CPT

## 2023-12-30 PROCEDURE — 87633 RESP VIRUS 12-25 TARGETS: CPT

## 2023-12-30 PROCEDURE — 85025 COMPLETE CBC W/AUTO DIFF WBC: CPT

## 2023-12-30 PROCEDURE — 87205 SMEAR GRAM STAIN: CPT

## 2023-12-30 PROCEDURE — 80069 RENAL FUNCTION PANEL: CPT

## 2023-12-30 PROCEDURE — 87077 CULTURE AEROBIC IDENTIFY: CPT

## 2023-12-30 PROCEDURE — 2060000000 HC ICU INTERMEDIATE R&B

## 2023-12-30 PROCEDURE — 83735 ASSAY OF MAGNESIUM: CPT

## 2023-12-30 PROCEDURE — 2580000003 HC RX 258: Performed by: STUDENT IN AN ORGANIZED HEALTH CARE EDUCATION/TRAINING PROGRAM

## 2023-12-30 PROCEDURE — 94640 AIRWAY INHALATION TREATMENT: CPT

## 2023-12-30 RX ORDER — OXYCODONE HYDROCHLORIDE 5 MG/1
10 TABLET ORAL EVERY 6 HOURS PRN
Status: COMPLETED | OUTPATIENT
Start: 2023-12-30 | End: 2023-12-31

## 2023-12-30 RX ORDER — INSULIN GLARGINE 100 [IU]/ML
10 INJECTION, SOLUTION SUBCUTANEOUS 2 TIMES DAILY
Status: DISCONTINUED | OUTPATIENT
Start: 2023-12-30 | End: 2024-01-01

## 2023-12-30 RX ADMIN — GABAPENTIN 300 MG: 300 CAPSULE ORAL at 08:41

## 2023-12-30 RX ADMIN — GUAIFENESIN 600 MG: 600 TABLET ORAL at 08:41

## 2023-12-30 RX ADMIN — TIOTROPIUM BROMIDE INHALATION SPRAY 2 PUFF: 3.12 SPRAY, METERED RESPIRATORY (INHALATION) at 07:45

## 2023-12-30 RX ADMIN — CLOPIDOGREL BISULFATE 75 MG: 75 TABLET ORAL at 08:41

## 2023-12-30 RX ADMIN — INSULIN LISPRO 2 UNITS: 100 INJECTION, SOLUTION INTRAVENOUS; SUBCUTANEOUS at 09:35

## 2023-12-30 RX ADMIN — ATORVASTATIN CALCIUM 40 MG: 40 TABLET, FILM COATED ORAL at 20:11

## 2023-12-30 RX ADMIN — OXYCODONE HYDROCHLORIDE 10 MG: 5 TABLET ORAL at 22:52

## 2023-12-30 RX ADMIN — GABAPENTIN 300 MG: 300 CAPSULE ORAL at 20:11

## 2023-12-30 RX ADMIN — INSULIN GLARGINE 10 UNITS: 100 INJECTION, SOLUTION SUBCUTANEOUS at 22:51

## 2023-12-30 RX ADMIN — CEFEPIME 2000 MG: 2 INJECTION, POWDER, FOR SOLUTION INTRAVENOUS at 05:22

## 2023-12-30 RX ADMIN — Medication 2 PUFF: at 07:45

## 2023-12-30 RX ADMIN — DEXAMETHASONE SODIUM PHOSPHATE 6 MG: 10 INJECTION INTRAMUSCULAR; INTRAVENOUS at 08:40

## 2023-12-30 RX ADMIN — ACETAMINOPHEN 650 MG: 325 TABLET ORAL at 16:08

## 2023-12-30 RX ADMIN — CEFEPIME 2000 MG: 2 INJECTION, POWDER, FOR SOLUTION INTRAVENOUS at 16:09

## 2023-12-30 RX ADMIN — INSULIN LISPRO 4 UNITS: 100 INJECTION, SOLUTION INTRAVENOUS; SUBCUTANEOUS at 20:10

## 2023-12-30 RX ADMIN — ASPIRIN 81 MG: 81 TABLET, CHEWABLE ORAL at 08:41

## 2023-12-30 RX ADMIN — FLUDROCORTISONE ACETATE 0.1 MG: 0.1 TABLET ORAL at 09:35

## 2023-12-30 RX ADMIN — SODIUM CHLORIDE, PRESERVATIVE FREE 10 ML: 5 INJECTION INTRAVENOUS at 08:41

## 2023-12-30 RX ADMIN — INSULIN LISPRO 2 UNITS: 100 INJECTION, SOLUTION INTRAVENOUS; SUBCUTANEOUS at 11:56

## 2023-12-30 RX ADMIN — DEXAMETHASONE SODIUM PHOSPHATE 6 MG: 10 INJECTION INTRAMUSCULAR; INTRAVENOUS at 20:10

## 2023-12-30 RX ADMIN — VANCOMYCIN HYDROCHLORIDE 500 MG: 500 INJECTION, POWDER, LYOPHILIZED, FOR SOLUTION INTRAVENOUS at 05:19

## 2023-12-30 RX ADMIN — DULOXETINE HYDROCHLORIDE 60 MG: 60 CAPSULE, DELAYED RELEASE ORAL at 08:41

## 2023-12-30 RX ADMIN — ACETAMINOPHEN 650 MG: 325 TABLET ORAL at 11:57

## 2023-12-30 RX ADMIN — ENOXAPARIN SODIUM 40 MG: 100 INJECTION SUBCUTANEOUS at 08:41

## 2023-12-30 RX ADMIN — PANTOPRAZOLE SODIUM 40 MG: 40 TABLET, DELAYED RELEASE ORAL at 08:41

## 2023-12-30 RX ADMIN — GUAIFENESIN 600 MG: 600 TABLET ORAL at 20:11

## 2023-12-30 RX ADMIN — DULOXETINE HYDROCHLORIDE 60 MG: 60 CAPSULE, DELAYED RELEASE ORAL at 20:19

## 2023-12-30 RX ADMIN — VANCOMYCIN HYDROCHLORIDE 500 MG: 500 INJECTION, POWDER, LYOPHILIZED, FOR SOLUTION INTRAVENOUS at 17:47

## 2023-12-30 RX ADMIN — Medication 2 PUFF: at 21:58

## 2023-12-30 ASSESSMENT — PAIN SCALES - WONG BAKER
WONGBAKER_NUMERICALRESPONSE: 4
WONGBAKER_NUMERICALRESPONSE: 2

## 2023-12-30 ASSESSMENT — PAIN SCALES - GENERAL
PAINLEVEL_OUTOF10: 7
PAINLEVEL_OUTOF10: 6
PAINLEVEL_OUTOF10: 5
PAINLEVEL_OUTOF10: 7
PAINLEVEL_OUTOF10: 6

## 2023-12-30 ASSESSMENT — PAIN DESCRIPTION - PAIN TYPE
TYPE: CHRONIC PAIN
TYPE: CHRONIC PAIN

## 2023-12-30 ASSESSMENT — PAIN DESCRIPTION - LOCATION
LOCATION: BACK

## 2023-12-30 ASSESSMENT — PAIN DESCRIPTION - DESCRIPTORS: DESCRIPTORS: ACHING;DISCOMFORT

## 2023-12-30 ASSESSMENT — PAIN DESCRIPTION - FREQUENCY: FREQUENCY: INTERMITTENT

## 2023-12-30 ASSESSMENT — PAIN - FUNCTIONAL ASSESSMENT: PAIN_FUNCTIONAL_ASSESSMENT: ACTIVITIES ARE NOT PREVENTED

## 2023-12-30 ASSESSMENT — PAIN DESCRIPTION - ONSET: ONSET: ON-GOING

## 2023-12-30 NOTE — PLAN OF CARE
Problem: Pain  Goal: Verbalizes/displays adequate comfort level or baseline comfort level  Outcome: Progressing  Flowsheets (Taken 12/30/2023 0406)  Verbalizes/displays adequate comfort level or baseline comfort level:   Encourage patient to monitor pain and request assistance   Assess pain using appropriate pain scale   Administer analgesics based on type and severity of pain and evaluate response   Consider cultural and social influences on pain and pain management     Problem: Discharge Planning  Goal: Discharge to home or other facility with appropriate resources  Outcome: Progressing  Flowsheets (Taken 12/29/2023 2000)  Discharge to home or other facility with appropriate resources:   Identify barriers to discharge with patient and caregiver   Arrange for needed discharge resources and transportation as appropriate   Identify discharge learning needs (meds, wound care, etc)   Refer to discharge planning if patient needs post-hospital services based on physician order or complex needs related to functional status, cognitive ability or social support system     Problem: Safety - Adult  Goal: Free from fall injury  Outcome: Progressing  Flowsheets (Taken 12/29/2023 1930)  Free From Fall Injury:   Instruct family/caregiver on patient safety   Based on caregiver fall risk screen, instruct family/caregiver to ask for assistance with transferring infant if caregiver noted to have fall risk factors

## 2023-12-30 NOTE — CONSULTS
mg/L  Probability of Ctrough,ss > 20: 35 %  Probability of nephrotoxicity (Lodise ULYSSES ): 14 %    Predicted AUC within goal range, continue current regimen and monitor.      Denton Eugene, [facility] 2023 4:59 PM      Day #3/7  Dx: HAP , SSTI  Staph aureus isolated from respiratory culutre  Scr: 1.1  Currently on vanc 500mg q12h  AUC24,ss: 442 mg/L.hr  Probability of AUC24 > 400: 70 %  Ctrough,ss: 14.9 mg/L    -------------------------------------------------------------------------------------------------------------------------------------                                       Vancomycin Progress Note  Day: 4 Indication: Sepsis  Other Antibiotics: Ceftriaxone     No results for input(s): \"VANCOTROUGH\" in the last 72 hours.  Recent Labs     23  1608   VANCORANDOM 14.1     Recent Labs     23  0530 23  0600 24  0554   CREATININE 1.3* 1.1 0.9     Recent Labs     23  0530 23  0600 24  0554   WBC 7.6 8.7 6.7     Estimated Creatinine Clearance: 62 mL/min (based on SCr of 0.9 mg/dL).  Date Culture Results    Blood x2 NGTD    MRSA DNA probe; nasal Positive    Strep Pneumoniae antigen; urine Presumptive Negative    Legionella antigen; urine Presumptive Negative    Pneumonia panel; sputum MRSA, E. Coli    Respiratory; sputum MRSA   [facility] has been updated with administration times, serum creatinine levels, and vancomycin blood levels  Current Dosin mg every 12 hours   Calculated AUC: 373 mg/L.hr Predicted Trough: 12 mcg/L     Will change dose to 750 mg every 12 hours for a calculated AUC of 537 mg/L.hr and a predicted trough of 17.1 mcg/mL  Level ordered for tomorrow () at 1500  Radha Mcdonnell PharmD 2024 12:39 PM    --------------------------------------------------------------------------------------------------------------------------------------      Vancomycin Day 5  Current Dosing: Vancomycin 750 mg q12h    Recent Labs    
history and other diagnostic  information is necessary to determine patient infection status.  A Detected results do not rule out bacterial infection or  co-infection with other pathogens.    Testing was performed using ALISA EVON SARS-CoV-2 and Influenza A/B  nucleic acid assay. This test is a multiplex Real-Time Reverse  Transcriptase Polymerase Chain Reaction (RT-PCR)-based in vitro  diagnostic test intended for the qualitative detection of nucleic  acids from SARS-CoV-2, influenza A, and influenza B in nasopharyngeal  and nasal swab specimens for use under the FDA’s Emergency Use  Authorization (EUA) only.    Patient Fact Sheet:  https://www.fda.gov/media/387254/download  Provider Fact Sheet: https://www.fda.gov/media/216002/download  EUA: https://www.fda.gov/media/932999/download  IFU: https://www.[employer].gov/media/698557/download    Methodology:  RT-PCR          INFLUENZA A NOT DETECTED     Comment: Note:  Influenza A and Influenza B negative results should be considered  presumptive in samples that have a Detected SARS-CoV-2 result.  Consider  re-testing with an alternate FDA-approved test for Flu A & B if clinically  indicated.          INFLUENZA B NOT DETECTED     Comment: Note:  Influenza A and Influenza B negative results should be considered  presumptive in samples that have a Detected SARS-CoV-2 result.  Consider  re-testing with an alternate FDA-approved test for Flu A & B if clinically  indicated.                    ______________  Vitals:    12/29/23 1755 12/29/23 1800 12/29/23 1805 12/29/23 1900   BP:   (!) 75/56 (!) 128/49   Pulse: 80 77 79 79   Resp: 16 20 17 15   Temp:   98.8 °F (37.1 °C)    TempSrc:       SpO2: 96% (!) 88% 98% 99%   Weight:       Height:            No intake or output data in the 24 hours ending 12/29/23 1932  Net IO Since Admission: No IO data has been entered for this period [12/29/23 1932]        Physical Exam:  General appearance: In no apparent distress.  HEENT: Moist mucus

## 2023-12-30 NOTE — PROCEDURES
laminectomy and recent kyphoplasty.   Refractured her jawbone which was previously repaired wi    Pneumonia of left lower lobe due to infectious organism     Post herpetic neuralgia     Pressure ulcer of left heel, stage 3 ([facility]) 01/12/2022    Proximal humerus fracture 10/01/2019    Rheumatoid arthritis ([facility])     Shingles 05/2020    Sleep apnea     Status post incision and drainage 07/2021    Left Leg    Surgical wound dehiscence, initial encounter (at [facility] SVG harvest site) 07/12/2021    Temporal arteritis ([facility]) 07/10/2013    Tobacco use 10/19/2017    Tracheomalacia     Vitreous hemorrhage, right eye ([facility]) 02/21/2020     Blood pressure (!) 128/49, pulse 79, temperature 98.8 °F (37.1 °C), resp. rate 15, height 1.702 m (5' 7\"), weight 81.6 kg (180 lb), SpO2 99 %.    Central Line    Date/Time: 12/29/2023 7:30 PM    Performed by: Misti Ferguson MD  Authorized by: Misti Ferguson MD              PROCEDURE NOTE: Central line placement     The patient gave consent to placement of a central catheter after we explained the procedure, risks and benefits. The indication for the catheter placement was vasopressors.   The procedure was performed under sterile conditions following our institutional policy.  The area was cleansed with chlorhexidine.   Local anesthetic was administered.  Ultrasound was used for vessel localization and needle introduction. The catheter was placed in the right IJ with Seldinger technique.   The type of catheter placed was triple-lumen catheter.  I aspirated blood from all ports and all ports were flushed with saline.  Line secured and covered with proper dressing. A chest xray pending.  The patient tolerated the procedure well and there were no complications.  _______________________________  Misti Ferguson MD  Pulmonary and Critical Care Specialist

## 2023-12-30 NOTE — PLAN OF CARE
Problem: Pain  Goal: Verbalizes/displays adequate comfort level or baseline comfort level  12/30/2023 1217 by Bart Damon RN  Outcome: Progressing  Flowsheets (Taken 12/30/2023 0800)  Verbalizes/displays adequate comfort level or baseline comfort level:   Encourage patient to monitor pain and request assistance   Assess pain using appropriate pain scale   Administer analgesics based on type and severity of pain and evaluate response  12/30/2023 0657 by Marily Walker, RN  Outcome: Progressing  Flowsheets (Taken 12/30/2023 0406)  Verbalizes/displays adequate comfort level or baseline comfort level:   Encourage patient to monitor pain and request assistance   Assess pain using appropriate pain scale   Administer analgesics based on type and severity of pain and evaluate response   Consider cultural and social influences on pain and pain management     Problem: Discharge Planning  Goal: Discharge to home or other facility with appropriate resources  12/30/2023 1217 by Bart Damon RN  Outcome: Progressing  Flowsheets  Taken 12/30/2023 0852  Discharge to home or other facility with appropriate resources:   Identify barriers to discharge with patient and caregiver   Arrange for needed discharge resources and transportation as appropriate   Identify discharge learning needs (meds, wound care, etc)  Taken 12/30/2023 0800  Discharge to home or other facility with appropriate resources:   Identify barriers to discharge with patient and caregiver   Arrange for needed discharge resources and transportation as appropriate   Identify discharge learning needs (meds, wound care, etc)  12/30/2023 0657 by Marily Walker, RN  Outcome: Progressing  Flowsheets (Taken 12/29/2023 2000)  Discharge to home or other facility with appropriate resources:   Identify barriers to discharge with patient and caregiver   Arrange for needed discharge resources and transportation as appropriate   Identify discharge

## 2023-12-31 LAB
ALBUMIN SERPL-MCNC: 2.9 G/DL (ref 3.4–5)
ANION GAP SERPL CALCULATED.3IONS-SCNC: 13 MMOL/L (ref 3–16)
BASOPHILS # BLD: 0 K/UL (ref 0–0.2)
BASOPHILS NFR BLD: 0.1 %
BUN SERPL-MCNC: 29 MG/DL (ref 7–20)
CALCIUM SERPL-MCNC: 8.6 MG/DL (ref 8.3–10.6)
CHLORIDE SERPL-SCNC: 103 MMOL/L (ref 99–110)
CO2 SERPL-SCNC: 21 MMOL/L (ref 21–32)
CREAT SERPL-MCNC: 1.1 MG/DL (ref 0.6–1.2)
DEPRECATED RDW RBC AUTO: 16.4 % (ref 12.4–15.4)
EOSINOPHIL # BLD: 0 K/UL (ref 0–0.6)
EOSINOPHIL NFR BLD: 0 %
GFR SERPLBLD CREATININE-BSD FMLA CKD-EPI: 53 ML/MIN/{1.73_M2}
GLUCOSE BLD-MCNC: 299 MG/DL (ref 70–99)
GLUCOSE BLD-MCNC: 363 MG/DL (ref 70–99)
GLUCOSE BLD-MCNC: 363 MG/DL (ref 70–99)
GLUCOSE SERPL-MCNC: 242 MG/DL (ref 70–99)
HCT VFR BLD AUTO: 29.6 % (ref 36–48)
HGB BLD-MCNC: 9.5 G/DL (ref 12–16)
LEGIONELLA AG UR QL: NORMAL
LYMPHOCYTES # BLD: 0.5 K/UL (ref 1–5.1)
LYMPHOCYTES NFR BLD: 6.1 %
MAGNESIUM SERPL-MCNC: 2.4 MG/DL (ref 1.8–2.4)
MCH RBC QN AUTO: 28 PG (ref 26–34)
MCHC RBC AUTO-ENTMCNC: 32.2 G/DL (ref 31–36)
MCV RBC AUTO: 87.1 FL (ref 80–100)
MONOCYTES # BLD: 0.3 K/UL (ref 0–1.3)
MONOCYTES NFR BLD: 3.9 %
MRSA DNA SPEC QL NAA+PROBE: ABNORMAL
NEUTROPHILS # BLD: 7.8 K/UL (ref 1.7–7.7)
NEUTROPHILS NFR BLD: 89.9 %
ORGANISM: ABNORMAL
PERFORMED ON: ABNORMAL
PHOSPHATE SERPL-MCNC: 3.3 MG/DL (ref 2.5–4.9)
PLATELET # BLD AUTO: 233 K/UL (ref 135–450)
PMV BLD AUTO: 7.2 FL (ref 5–10.5)
PNEUMONIA PANEL MOLECULAR: ABNORMAL
PNEUMONIA PANEL MOLECULAR: ABNORMAL
POTASSIUM SERPL-SCNC: 4.1 MMOL/L (ref 3.5–5.1)
RBC # BLD AUTO: 3.4 M/UL (ref 4–5.2)
REPORT: NORMAL
S PNEUM AG UR QL: NORMAL
SODIUM SERPL-SCNC: 137 MMOL/L (ref 136–145)
WBC # BLD AUTO: 8.7 K/UL (ref 4–11)

## 2023-12-31 PROCEDURE — 85025 COMPLETE CBC W/AUTO DIFF WBC: CPT

## 2023-12-31 PROCEDURE — 99233 SBSQ HOSP IP/OBS HIGH 50: CPT | Performed by: INTERNAL MEDICINE

## 2023-12-31 PROCEDURE — 36592 COLLECT BLOOD FROM PICC: CPT

## 2023-12-31 PROCEDURE — 80069 RENAL FUNCTION PANEL: CPT

## 2023-12-31 PROCEDURE — 94761 N-INVAS EAR/PLS OXIMETRY MLT: CPT

## 2023-12-31 PROCEDURE — 2700000000 HC OXYGEN THERAPY PER DAY

## 2023-12-31 PROCEDURE — 94640 AIRWAY INHALATION TREATMENT: CPT

## 2023-12-31 PROCEDURE — 2580000003 HC RX 258: Performed by: INTERNAL MEDICINE

## 2023-12-31 PROCEDURE — 2580000003 HC RX 258: Performed by: STUDENT IN AN ORGANIZED HEALTH CARE EDUCATION/TRAINING PROGRAM

## 2023-12-31 PROCEDURE — 6370000000 HC RX 637 (ALT 250 FOR IP): Performed by: HOSPITALIST

## 2023-12-31 PROCEDURE — 83735 ASSAY OF MAGNESIUM: CPT

## 2023-12-31 PROCEDURE — 83036 HEMOGLOBIN GLYCOSYLATED A1C: CPT

## 2023-12-31 PROCEDURE — 6360000002 HC RX W HCPCS: Performed by: STUDENT IN AN ORGANIZED HEALTH CARE EDUCATION/TRAINING PROGRAM

## 2023-12-31 PROCEDURE — 6370000000 HC RX 637 (ALT 250 FOR IP): Performed by: STUDENT IN AN ORGANIZED HEALTH CARE EDUCATION/TRAINING PROGRAM

## 2023-12-31 PROCEDURE — 2060000000 HC ICU INTERMEDIATE R&B

## 2023-12-31 PROCEDURE — 6360000002 HC RX W HCPCS: Performed by: INTERNAL MEDICINE

## 2023-12-31 RX ORDER — OXYCODONE AND ACETAMINOPHEN 10; 325 MG/1; MG/1
1 TABLET ORAL EVERY 6 HOURS PRN
Status: DISCONTINUED | OUTPATIENT
Start: 2023-12-31 | End: 2024-01-07 | Stop reason: HOSPADM

## 2023-12-31 RX ORDER — DEXAMETHASONE 4 MG/1
6 TABLET ORAL DAILY
Status: DISCONTINUED | OUTPATIENT
Start: 2023-12-31 | End: 2024-01-02

## 2023-12-31 RX ORDER — MORPHINE SULFATE 15 MG/1
15 TABLET, FILM COATED, EXTENDED RELEASE ORAL EVERY 12 HOURS SCHEDULED
Status: DISCONTINUED | OUTPATIENT
Start: 2023-12-31 | End: 2024-01-07 | Stop reason: HOSPADM

## 2023-12-31 RX ORDER — BENZONATATE 100 MG/1
100 CAPSULE ORAL 3 TIMES DAILY PRN
Status: DISCONTINUED | OUTPATIENT
Start: 2023-12-31 | End: 2024-01-07 | Stop reason: HOSPADM

## 2023-12-31 RX ORDER — LANOLIN ALCOHOL/MO/W.PET/CERES
3 CREAM (GRAM) TOPICAL NIGHTLY PRN
Status: DISCONTINUED | OUTPATIENT
Start: 2023-12-31 | End: 2024-01-07 | Stop reason: HOSPADM

## 2023-12-31 RX ADMIN — VANCOMYCIN HYDROCHLORIDE 500 MG: 500 INJECTION, POWDER, LYOPHILIZED, FOR SOLUTION INTRAVENOUS at 15:53

## 2023-12-31 RX ADMIN — Medication 2 PUFF: at 08:01

## 2023-12-31 RX ADMIN — CEFTRIAXONE SODIUM 1000 MG: 1 INJECTION, POWDER, FOR SOLUTION INTRAMUSCULAR; INTRAVENOUS at 15:15

## 2023-12-31 RX ADMIN — PANTOPRAZOLE SODIUM 40 MG: 40 TABLET, DELAYED RELEASE ORAL at 06:27

## 2023-12-31 RX ADMIN — MORPHINE SULFATE 15 MG: 15 TABLET, FILM COATED, EXTENDED RELEASE ORAL at 19:58

## 2023-12-31 RX ADMIN — CLOPIDOGREL BISULFATE 75 MG: 75 TABLET ORAL at 09:31

## 2023-12-31 RX ADMIN — GUAIFENESIN AND DEXTROMETHORPHAN 5 ML: 100; 10 SYRUP ORAL at 17:10

## 2023-12-31 RX ADMIN — GUAIFENESIN 600 MG: 600 TABLET ORAL at 19:58

## 2023-12-31 RX ADMIN — INSULIN GLARGINE 10 UNITS: 100 INJECTION, SOLUTION SUBCUTANEOUS at 20:00

## 2023-12-31 RX ADMIN — INSULIN LISPRO 4 UNITS: 100 INJECTION, SOLUTION INTRAVENOUS; SUBCUTANEOUS at 09:30

## 2023-12-31 RX ADMIN — TIOTROPIUM BROMIDE INHALATION SPRAY 2 PUFF: 3.12 SPRAY, METERED RESPIRATORY (INHALATION) at 08:01

## 2023-12-31 RX ADMIN — GABAPENTIN 300 MG: 300 CAPSULE ORAL at 19:58

## 2023-12-31 RX ADMIN — DEXAMETHASONE SODIUM PHOSPHATE 6 MG: 10 INJECTION INTRAMUSCULAR; INTRAVENOUS at 09:31

## 2023-12-31 RX ADMIN — OXYCODONE HYDROCHLORIDE 5 MG: 5 TABLET ORAL at 12:27

## 2023-12-31 RX ADMIN — CEFEPIME 2000 MG: 2 INJECTION, POWDER, FOR SOLUTION INTRAVENOUS at 06:29

## 2023-12-31 RX ADMIN — DULOXETINE HYDROCHLORIDE 60 MG: 60 CAPSULE, DELAYED RELEASE ORAL at 19:58

## 2023-12-31 RX ADMIN — DULOXETINE HYDROCHLORIDE 60 MG: 60 CAPSULE, DELAYED RELEASE ORAL at 09:31

## 2023-12-31 RX ADMIN — INSULIN GLARGINE 10 UNITS: 100 INJECTION, SOLUTION SUBCUTANEOUS at 09:30

## 2023-12-31 RX ADMIN — FLUDROCORTISONE ACETATE 0.1 MG: 0.1 TABLET ORAL at 12:26

## 2023-12-31 RX ADMIN — ASPIRIN 81 MG: 81 TABLET, CHEWABLE ORAL at 09:32

## 2023-12-31 RX ADMIN — INSULIN LISPRO 4 UNITS: 100 INJECTION, SOLUTION INTRAVENOUS; SUBCUTANEOUS at 12:26

## 2023-12-31 RX ADMIN — ATORVASTATIN CALCIUM 40 MG: 40 TABLET, FILM COATED ORAL at 19:58

## 2023-12-31 RX ADMIN — GUAIFENESIN 600 MG: 600 TABLET ORAL at 09:31

## 2023-12-31 RX ADMIN — OXYCODONE AND ACETAMINOPHEN 1 TABLET: 10; 325 TABLET ORAL at 15:51

## 2023-12-31 RX ADMIN — SODIUM CHLORIDE, PRESERVATIVE FREE 10 ML: 5 INJECTION INTRAVENOUS at 09:32

## 2023-12-31 RX ADMIN — Medication 2 PUFF: at 21:01

## 2023-12-31 RX ADMIN — SODIUM CHLORIDE, PRESERVATIVE FREE 10 ML: 5 INJECTION INTRAVENOUS at 20:01

## 2023-12-31 RX ADMIN — VANCOMYCIN HYDROCHLORIDE 500 MG: 500 INJECTION, POWDER, LYOPHILIZED, FOR SOLUTION INTRAVENOUS at 06:29

## 2023-12-31 RX ADMIN — GABAPENTIN 300 MG: 300 CAPSULE ORAL at 09:31

## 2023-12-31 RX ADMIN — ENOXAPARIN SODIUM 40 MG: 100 INJECTION SUBCUTANEOUS at 09:31

## 2023-12-31 RX ADMIN — OXYCODONE HYDROCHLORIDE 10 MG: 5 TABLET ORAL at 06:27

## 2023-12-31 RX ADMIN — INSULIN LISPRO 4 UNITS: 100 INJECTION, SOLUTION INTRAVENOUS; SUBCUTANEOUS at 17:10

## 2023-12-31 ASSESSMENT — PAIN SCALES - GENERAL
PAINLEVEL_OUTOF10: 10
PAINLEVEL_OUTOF10: 6
PAINLEVEL_OUTOF10: 7

## 2023-12-31 ASSESSMENT — PAIN DESCRIPTION - FREQUENCY: FREQUENCY: INTERMITTENT

## 2023-12-31 ASSESSMENT — PAIN DESCRIPTION - LOCATION
LOCATION: GENERALIZED
LOCATION: GENERALIZED;BACK
LOCATION: GENERALIZED

## 2023-12-31 ASSESSMENT — PAIN DESCRIPTION - PAIN TYPE: TYPE: CHRONIC PAIN

## 2023-12-31 ASSESSMENT — PAIN SCALES - WONG BAKER: WONGBAKER_NUMERICALRESPONSE: 4

## 2023-12-31 ASSESSMENT — PAIN DESCRIPTION - DESCRIPTORS
DESCRIPTORS: ACHING
DESCRIPTORS: ACHING;SORE
DESCRIPTORS: ACHING;SORE
DESCRIPTORS: ACHING;DISCOMFORT
DESCRIPTORS: ACHING

## 2023-12-31 ASSESSMENT — PAIN DESCRIPTION - ONSET: ONSET: GRADUAL

## 2023-12-31 ASSESSMENT — PAIN - FUNCTIONAL ASSESSMENT: PAIN_FUNCTIONAL_ASSESSMENT: PREVENTS OR INTERFERES SOME ACTIVE ACTIVITIES AND ADLS

## 2023-12-31 NOTE — FLOWSHEET NOTE
Patient transferred to room 449 from [facility].  Patient oriented to room, call light, bed rails, phone, lights and bathroom.  Patient instructed about the schedule of the day including: vital sign frequency, lab draws, possible tests, frequency of MD and staff rounds, including RN/MD rounding together at bedside, daily weights, and I &O's.  Patient instructed about prescribed diet, how to use 8MENU, and television. bed alarm in place, patient aware of placement and reason.  Telemetry box 60 in place, patient aware of placement and reason.  Bed locked, in lowest position, side rails up 2/4, call light within reach. VSS.       12/30/23 2049   Vital Signs   Temp 99.3 °F (37.4 °C)   Temp Source Oral   Pulse 95   Heart Rate Source Monitor   Respirations 22   /72   MAP (Calculated) 92   MAP (mmHg) 92   BP Location Left upper arm   BP Method Automatic   Patient Position Semi fowlers   Pain Assessment   Pain Assessment 0-10   Pain Level 6   Patient's Stated Pain Goal 0 - No pain   Pain Location Back   Pain Type Chronic pain   Non-Pharmaceutical Pain Intervention(s) Emotional support;Repositioned   Response to Pain Intervention None (pain unchanged or no improvement)   Side Effects No reported side effects   Multiple Pain Sites No   Oxygen Therapy   SpO2 96 %   Pulse Oximetry Type Continuous   Pulse Oximeter Device Mode Continuous   Pulse Oximeter Device Location Finger   O2 Device Nasal cannula   Oximetry Probe Site Changed Yes   Skin Assessment Clean, dry, & intact   O2 Flow Rate (L/min) 3 L/min

## 2023-12-31 NOTE — PLAN OF CARE
Problem: Pain  Goal: Verbalizes/displays adequate comfort level or baseline comfort level  Outcome: Progressing     Problem: Discharge Planning  Goal: Discharge to home or other facility with appropriate resources  Outcome: Progressing     Problem: Safety - Adult  Goal: Free from fall injury  Outcome: Progressing     Problem: Skin/Tissue Integrity  Goal: Absence of new skin breakdown  Outcome: Progressing     Problem: Chronic Conditions and Co-morbidities  Goal: Patient's chronic conditions and co-morbidity symptoms are monitored and maintained or improved  Outcome: Progressing     Problem: Nutrition Deficit:  Goal: Optimize nutritional status  Outcome: Progressing

## 2024-01-01 ENCOUNTER — TELEPHONE (OUTPATIENT)
Dept: PULMONOLOGY | Age: 74
End: 2024-01-01

## 2024-01-01 PROBLEM — U07.1 COVID-19: Status: ACTIVE | Noted: 2024-01-01

## 2024-01-01 PROBLEM — J15.5: Status: ACTIVE | Noted: 2024-01-01

## 2024-01-01 PROBLEM — A49.02 MRSA (METHICILLIN RESISTANT STAPHYLOCOCCUS AUREUS): Status: ACTIVE | Noted: 2024-01-01

## 2024-01-01 LAB
ALBUMIN SERPL-MCNC: 2.8 G/DL (ref 3.4–5)
ANION GAP SERPL CALCULATED.3IONS-SCNC: 7 MMOL/L (ref 3–16)
BASOPHILS # BLD: 0 K/UL (ref 0–0.2)
BASOPHILS NFR BLD: 0.3 %
BUN SERPL-MCNC: 22 MG/DL (ref 7–20)
CALCIUM SERPL-MCNC: 8.4 MG/DL (ref 8.3–10.6)
CHLORIDE SERPL-SCNC: 106 MMOL/L (ref 99–110)
CO2 SERPL-SCNC: 24 MMOL/L (ref 21–32)
CREAT SERPL-MCNC: 0.9 MG/DL (ref 0.6–1.2)
DEPRECATED RDW RBC AUTO: 16 % (ref 12.4–15.4)
EOSINOPHIL # BLD: 0 K/UL (ref 0–0.6)
EOSINOPHIL NFR BLD: 0.1 %
GFR SERPLBLD CREATININE-BSD FMLA CKD-EPI: >60 ML/MIN/{1.73_M2}
GLUCOSE BLD-MCNC: 167 MG/DL (ref 70–99)
GLUCOSE BLD-MCNC: 168 MG/DL (ref 70–99)
GLUCOSE BLD-MCNC: 272 MG/DL (ref 70–99)
GLUCOSE BLD-MCNC: 322 MG/DL (ref 70–99)
GLUCOSE BLD-MCNC: 339 MG/DL (ref 70–99)
GLUCOSE SERPL-MCNC: 156 MG/DL (ref 70–99)
HCT VFR BLD AUTO: 26.4 % (ref 36–48)
HGB BLD-MCNC: 8.6 G/DL (ref 12–16)
LYMPHOCYTES # BLD: 0.7 K/UL (ref 1–5.1)
LYMPHOCYTES NFR BLD: 9.8 %
MAGNESIUM SERPL-MCNC: 2.3 MG/DL (ref 1.8–2.4)
MCH RBC QN AUTO: 28.1 PG (ref 26–34)
MCHC RBC AUTO-ENTMCNC: 32.5 G/DL (ref 31–36)
MCV RBC AUTO: 86.4 FL (ref 80–100)
MONOCYTES # BLD: 0.3 K/UL (ref 0–1.3)
MONOCYTES NFR BLD: 4.6 %
NEUTROPHILS # BLD: 5.7 K/UL (ref 1.7–7.7)
NEUTROPHILS NFR BLD: 85.2 %
PERFORMED ON: ABNORMAL
PHOSPHATE SERPL-MCNC: 1.8 MG/DL (ref 2.5–4.9)
PLATELET # BLD AUTO: 204 K/UL (ref 135–450)
PMV BLD AUTO: 7.3 FL (ref 5–10.5)
POTASSIUM SERPL-SCNC: 3.7 MMOL/L (ref 3.5–5.1)
RBC # BLD AUTO: 3.05 M/UL (ref 4–5.2)
SODIUM SERPL-SCNC: 137 MMOL/L (ref 136–145)
WBC # BLD AUTO: 6.7 K/UL (ref 4–11)

## 2024-01-01 PROCEDURE — 94669 MECHANICAL CHEST WALL OSCILL: CPT

## 2024-01-01 PROCEDURE — 6370000000 HC RX 637 (ALT 250 FOR IP): Performed by: HOSPITALIST

## 2024-01-01 PROCEDURE — 94640 AIRWAY INHALATION TREATMENT: CPT

## 2024-01-01 PROCEDURE — 80069 RENAL FUNCTION PANEL: CPT

## 2024-01-01 PROCEDURE — 83735 ASSAY OF MAGNESIUM: CPT

## 2024-01-01 PROCEDURE — 97162 PT EVAL MOD COMPLEX 30 MIN: CPT

## 2024-01-01 PROCEDURE — 6360000002 HC RX W HCPCS: Performed by: INTERNAL MEDICINE

## 2024-01-01 PROCEDURE — 99232 SBSQ HOSP IP/OBS MODERATE 35: CPT | Performed by: INTERNAL MEDICINE

## 2024-01-01 PROCEDURE — 2580000003 HC RX 258: Performed by: INTERNAL MEDICINE

## 2024-01-01 PROCEDURE — 97116 GAIT TRAINING THERAPY: CPT

## 2024-01-01 PROCEDURE — 6360000002 HC RX W HCPCS: Performed by: HOSPITALIST

## 2024-01-01 PROCEDURE — 2060000000 HC ICU INTERMEDIATE R&B

## 2024-01-01 PROCEDURE — 2580000003 HC RX 258: Performed by: HOSPITALIST

## 2024-01-01 PROCEDURE — 97530 THERAPEUTIC ACTIVITIES: CPT

## 2024-01-01 PROCEDURE — 6360000002 HC RX W HCPCS: Performed by: STUDENT IN AN ORGANIZED HEALTH CARE EDUCATION/TRAINING PROGRAM

## 2024-01-01 PROCEDURE — 6370000000 HC RX 637 (ALT 250 FOR IP): Performed by: STUDENT IN AN ORGANIZED HEALTH CARE EDUCATION/TRAINING PROGRAM

## 2024-01-01 PROCEDURE — 97166 OT EVAL MOD COMPLEX 45 MIN: CPT

## 2024-01-01 PROCEDURE — 97535 SELF CARE MNGMENT TRAINING: CPT

## 2024-01-01 PROCEDURE — 2580000003 HC RX 258: Performed by: STUDENT IN AN ORGANIZED HEALTH CARE EDUCATION/TRAINING PROGRAM

## 2024-01-01 PROCEDURE — 85025 COMPLETE CBC W/AUTO DIFF WBC: CPT

## 2024-01-01 RX ORDER — HYDROXYZINE HYDROCHLORIDE 10 MG/1
25 TABLET, FILM COATED ORAL 3 TIMES DAILY PRN
Status: DISCONTINUED | OUTPATIENT
Start: 2024-01-01 | End: 2024-01-07 | Stop reason: HOSPADM

## 2024-01-01 RX ORDER — INSULIN GLARGINE 100 [IU]/ML
12 INJECTION, SOLUTION SUBCUTANEOUS 2 TIMES DAILY
Status: DISCONTINUED | OUTPATIENT
Start: 2024-01-01 | End: 2024-01-03

## 2024-01-01 RX ORDER — INSULIN LISPRO 100 [IU]/ML
5 INJECTION, SOLUTION INTRAVENOUS; SUBCUTANEOUS
Status: DISCONTINUED | OUTPATIENT
Start: 2024-01-01 | End: 2024-01-05

## 2024-01-01 RX ORDER — DIAZEPAM 2 MG/1
2 TABLET ORAL EVERY 8 HOURS PRN
Status: DISCONTINUED | OUTPATIENT
Start: 2024-01-01 | End: 2024-01-01

## 2024-01-01 RX ADMIN — INSULIN LISPRO 5 UNITS: 100 INJECTION, SOLUTION INTRAVENOUS; SUBCUTANEOUS at 17:44

## 2024-01-01 RX ADMIN — OXYCODONE AND ACETAMINOPHEN 1 TABLET: 10; 325 TABLET ORAL at 16:37

## 2024-01-01 RX ADMIN — Medication 2 PUFF: at 12:55

## 2024-01-01 RX ADMIN — DULOXETINE HYDROCHLORIDE 60 MG: 60 CAPSULE, DELAYED RELEASE ORAL at 11:09

## 2024-01-01 RX ADMIN — MORPHINE SULFATE 15 MG: 15 TABLET, FILM COATED, EXTENDED RELEASE ORAL at 20:36

## 2024-01-01 RX ADMIN — Medication 3 MG: at 21:56

## 2024-01-01 RX ADMIN — CLOPIDOGREL BISULFATE 75 MG: 75 TABLET ORAL at 11:09

## 2024-01-01 RX ADMIN — CEFTRIAXONE SODIUM 1000 MG: 1 INJECTION, POWDER, FOR SOLUTION INTRAMUSCULAR; INTRAVENOUS at 14:08

## 2024-01-01 RX ADMIN — FLUDROCORTISONE ACETATE 0.1 MG: 0.1 TABLET ORAL at 12:52

## 2024-01-01 RX ADMIN — SODIUM CHLORIDE, PRESERVATIVE FREE 10 ML: 5 INJECTION INTRAVENOUS at 11:13

## 2024-01-01 RX ADMIN — MORPHINE SULFATE 15 MG: 15 TABLET, FILM COATED, EXTENDED RELEASE ORAL at 11:10

## 2024-01-01 RX ADMIN — DULOXETINE HYDROCHLORIDE 60 MG: 60 CAPSULE, DELAYED RELEASE ORAL at 20:37

## 2024-01-01 RX ADMIN — SODIUM CHLORIDE, PRESERVATIVE FREE 10 ML: 5 INJECTION INTRAVENOUS at 20:37

## 2024-01-01 RX ADMIN — DEXAMETHASONE 6 MG: 4 TABLET ORAL at 11:10

## 2024-01-01 RX ADMIN — GUAIFENESIN AND DEXTROMETHORPHAN 5 ML: 100; 10 SYRUP ORAL at 21:59

## 2024-01-01 RX ADMIN — GUAIFENESIN 600 MG: 600 TABLET ORAL at 20:36

## 2024-01-01 RX ADMIN — GUAIFENESIN 600 MG: 600 TABLET ORAL at 11:11

## 2024-01-01 RX ADMIN — GABAPENTIN 300 MG: 300 CAPSULE ORAL at 20:37

## 2024-01-01 RX ADMIN — INSULIN GLARGINE 10 UNITS: 100 INJECTION, SOLUTION SUBCUTANEOUS at 11:11

## 2024-01-01 RX ADMIN — GUAIFENESIN AND DEXTROMETHORPHAN 5 ML: 100; 10 SYRUP ORAL at 15:56

## 2024-01-01 RX ADMIN — ATORVASTATIN CALCIUM 40 MG: 40 TABLET, FILM COATED ORAL at 20:37

## 2024-01-01 RX ADMIN — GABAPENTIN 300 MG: 300 CAPSULE ORAL at 11:09

## 2024-01-01 RX ADMIN — INSULIN LISPRO 4 UNITS: 100 INJECTION, SOLUTION INTRAVENOUS; SUBCUTANEOUS at 12:49

## 2024-01-01 RX ADMIN — TIOTROPIUM BROMIDE INHALATION SPRAY 2 PUFF: 3.12 SPRAY, METERED RESPIRATORY (INHALATION) at 12:55

## 2024-01-01 RX ADMIN — Medication 2 PUFF: at 20:44

## 2024-01-01 RX ADMIN — PANTOPRAZOLE SODIUM 40 MG: 40 TABLET, DELAYED RELEASE ORAL at 05:48

## 2024-01-01 RX ADMIN — VANCOMYCIN HYDROCHLORIDE 750 MG: 750 INJECTION, POWDER, LYOPHILIZED, FOR SOLUTION INTRAVENOUS at 17:40

## 2024-01-01 RX ADMIN — ACETAMINOPHEN 650 MG: 325 TABLET ORAL at 04:16

## 2024-01-01 RX ADMIN — VANCOMYCIN HYDROCHLORIDE 500 MG: 500 INJECTION, POWDER, LYOPHILIZED, FOR SOLUTION INTRAVENOUS at 05:36

## 2024-01-01 RX ADMIN — ASPIRIN 81 MG: 81 TABLET, CHEWABLE ORAL at 11:09

## 2024-01-01 RX ADMIN — INSULIN LISPRO 3 UNITS: 100 INJECTION, SOLUTION INTRAVENOUS; SUBCUTANEOUS at 17:44

## 2024-01-01 RX ADMIN — INSULIN GLARGINE 12 UNITS: 100 INJECTION, SOLUTION SUBCUTANEOUS at 20:36

## 2024-01-01 RX ADMIN — ENOXAPARIN SODIUM 40 MG: 100 INJECTION SUBCUTANEOUS at 11:12

## 2024-01-01 ASSESSMENT — PAIN SCALES - GENERAL
PAINLEVEL_OUTOF10: 4
PAINLEVEL_OUTOF10: 7
PAINLEVEL_OUTOF10: 10

## 2024-01-01 ASSESSMENT — PAIN DESCRIPTION - ORIENTATION: ORIENTATION: LOWER

## 2024-01-01 ASSESSMENT — PAIN DESCRIPTION - DESCRIPTORS: DESCRIPTORS: ACHING

## 2024-01-01 ASSESSMENT — PAIN DESCRIPTION - LOCATION
LOCATION: BACK;BUTTOCKS
LOCATION: HEAD

## 2024-01-01 ASSESSMENT — PAIN DESCRIPTION - PAIN TYPE: TYPE: CHRONIC PAIN

## 2024-01-02 ENCOUNTER — APPOINTMENT (OUTPATIENT)
Dept: GENERAL RADIOLOGY | Age: 74
DRG: 871 | End: 2024-01-02
Payer: MEDICARE

## 2024-01-02 PROBLEM — J18.1 LUNG CONSOLIDATION (HCC): Status: ACTIVE | Noted: 2024-01-02

## 2024-01-02 LAB
ALBUMIN SERPL-MCNC: 3 G/DL (ref 3.4–5)
ALBUMIN/GLOB SERPL: 1 {RATIO} (ref 1.1–2.2)
ALP SERPL-CCNC: 56 U/L (ref 40–129)
ALT SERPL-CCNC: 12 U/L (ref 10–40)
ANION GAP SERPL CALCULATED.3IONS-SCNC: 10 MMOL/L (ref 3–16)
AST SERPL-CCNC: 12 U/L (ref 15–37)
BACTERIA BLD CULT ORG #2: NORMAL
BACTERIA BLD CULT: NORMAL
BACTERIA SPEC RESP CULT: ABNORMAL
BACTERIA SPEC RESP CULT: ABNORMAL
BASE EXCESS BLDA CALC-SCNC: -5 MMOL/L (ref -3–3)
BASOPHILS # BLD: 0 K/UL (ref 0–0.2)
BASOPHILS NFR BLD: 0.2 %
BILIRUB SERPL-MCNC: 0.4 MG/DL (ref 0–1)
BUN SERPL-MCNC: 18 MG/DL (ref 7–20)
CA-I BLD-SCNC: 1.16 MMOL/L (ref 1.12–1.32)
CALCIUM SERPL-MCNC: 8.4 MG/DL (ref 8.3–10.6)
CHLORIDE SERPL-SCNC: 99 MMOL/L (ref 99–110)
CO2 BLDA-SCNC: 20 MMOL/L
CO2 SERPL-SCNC: 22 MMOL/L (ref 21–32)
CREAT SERPL-MCNC: 1 MG/DL (ref 0.6–1.2)
D DIMER: 14.58 UG/ML FEU (ref 0–0.6)
DEPRECATED RDW RBC AUTO: 16.3 % (ref 12.4–15.4)
EKG ATRIAL RATE: 112 BPM
EKG DIAGNOSIS: NORMAL
EKG P AXIS: 63 DEGREES
EKG P-R INTERVAL: 130 MS
EKG Q-T INTERVAL: 348 MS
EKG QRS DURATION: 112 MS
EKG QTC CALCULATION (BAZETT): 475 MS
EKG R AXIS: -31 DEGREES
EKG T AXIS: 51 DEGREES
EKG VENTRICULAR RATE: 112 BPM
EOSINOPHIL # BLD: 0 K/UL (ref 0–0.6)
EOSINOPHIL NFR BLD: 0.1 %
EST. AVERAGE GLUCOSE BLD GHB EST-MCNC: 185.8 MG/DL
GFR SERPLBLD CREATININE-BSD FMLA CKD-EPI: 60 ML/MIN/{1.73_M2}
GLUCOSE BLD-MCNC: 105 MG/DL (ref 70–99)
GLUCOSE BLD-MCNC: 113 MG/DL (ref 70–99)
GLUCOSE BLD-MCNC: 227 MG/DL (ref 70–99)
GLUCOSE BLD-MCNC: 231 MG/DL (ref 70–99)
GLUCOSE BLD-MCNC: 232 MG/DL (ref 70–99)
GLUCOSE BLD-MCNC: 232 MG/DL (ref 70–99)
GLUCOSE BLD-MCNC: 242 MG/DL (ref 70–99)
GLUCOSE BLD-MCNC: 243 MG/DL (ref 70–99)
GLUCOSE BLD-MCNC: 253 MG/DL (ref 70–99)
GLUCOSE BLD-MCNC: 256 MG/DL (ref 70–99)
GLUCOSE BLD-MCNC: 51 MG/DL (ref 70–99)
GLUCOSE SERPL-MCNC: 263 MG/DL (ref 70–99)
GRAM STN SPEC: ABNORMAL
HBA1C MFR BLD: 8.1 %
HCO3 BLDA-SCNC: 19.5 MMOL/L (ref 21–29)
HCT VFR BLD AUTO: 29.2 % (ref 36–48)
HCT VFR BLD AUTO: 30 % (ref 36–48)
HGB BLD CALC-MCNC: 10.3 GM/DL (ref 12–16)
HGB BLD-MCNC: 9.4 G/DL (ref 12–16)
LACTATE BLD-SCNC: 2.25 MMOL/L (ref 0.4–2)
LACTATE BLDV-SCNC: 1.3 MMOL/L (ref 0.4–2)
LYMPHOCYTES # BLD: 0.5 K/UL (ref 1–5.1)
LYMPHOCYTES NFR BLD: 8.5 %
MCH RBC QN AUTO: 27.8 PG (ref 26–34)
MCHC RBC AUTO-ENTMCNC: 32.3 G/DL (ref 31–36)
MCV RBC AUTO: 85.9 FL (ref 80–100)
MONOCYTES # BLD: 0.2 K/UL (ref 0–1.3)
MONOCYTES NFR BLD: 3.5 %
NEUTROPHILS # BLD: 5.6 K/UL (ref 1.7–7.7)
NEUTROPHILS NFR BLD: 87.7 %
ORGANISM: ABNORMAL
PCO2 BLDA: 30.1 MM HG (ref 35–45)
PERFORMED ON: ABNORMAL
PH BLDA: 7.42 [PH] (ref 7.35–7.45)
PLATELET # BLD AUTO: 196 K/UL (ref 135–450)
PMV BLD AUTO: 7.3 FL (ref 5–10.5)
PO2 BLDA: 36.7 MM HG (ref 75–108)
POC SAMPLE TYPE: ABNORMAL
POTASSIUM BLD-SCNC: 3.8 MMOL/L (ref 3.5–5.1)
POTASSIUM SERPL-SCNC: 3.8 MMOL/L (ref 3.5–5.1)
PROT SERPL-MCNC: 6.1 G/DL (ref 6.4–8.2)
RBC # BLD AUTO: 3.4 M/UL (ref 4–5.2)
SAO2 % BLDA: 72 % (ref 93–100)
SODIUM BLD-SCNC: 134 MMOL/L (ref 136–145)
SODIUM SERPL-SCNC: 131 MMOL/L (ref 136–145)
TROPONIN, HIGH SENSITIVITY: 38 NG/L (ref 0–14)
VANCOMYCIN SERPL-MCNC: 18.7 UG/ML
WBC # BLD AUTO: 6.4 K/UL (ref 4–11)

## 2024-01-02 PROCEDURE — 93005 ELECTROCARDIOGRAM TRACING: CPT | Performed by: STUDENT IN AN ORGANIZED HEALTH CARE EDUCATION/TRAINING PROGRAM

## 2024-01-02 PROCEDURE — 6360000002 HC RX W HCPCS: Performed by: STUDENT IN AN ORGANIZED HEALTH CARE EDUCATION/TRAINING PROGRAM

## 2024-01-02 PROCEDURE — 84295 ASSAY OF SERUM SODIUM: CPT

## 2024-01-02 PROCEDURE — 82947 ASSAY GLUCOSE BLOOD QUANT: CPT

## 2024-01-02 PROCEDURE — 6360000002 HC RX W HCPCS: Performed by: INTERNAL MEDICINE

## 2024-01-02 PROCEDURE — 6370000000 HC RX 637 (ALT 250 FOR IP): Performed by: STUDENT IN AN ORGANIZED HEALTH CARE EDUCATION/TRAINING PROGRAM

## 2024-01-02 PROCEDURE — 94761 N-INVAS EAR/PLS OXIMETRY MLT: CPT

## 2024-01-02 PROCEDURE — 85025 COMPLETE CBC W/AUTO DIFF WBC: CPT

## 2024-01-02 PROCEDURE — 94640 AIRWAY INHALATION TREATMENT: CPT

## 2024-01-02 PROCEDURE — 6370000000 HC RX 637 (ALT 250 FOR IP): Performed by: HOSPITALIST

## 2024-01-02 PROCEDURE — 84132 ASSAY OF SERUM POTASSIUM: CPT

## 2024-01-02 PROCEDURE — 94660 CPAP INITIATION&MGMT: CPT

## 2024-01-02 PROCEDURE — 84484 ASSAY OF TROPONIN QUANT: CPT

## 2024-01-02 PROCEDURE — 80053 COMPREHEN METABOLIC PANEL: CPT

## 2024-01-02 PROCEDURE — 6360000002 HC RX W HCPCS: Performed by: HOSPITALIST

## 2024-01-02 PROCEDURE — 82803 BLOOD GASES ANY COMBINATION: CPT

## 2024-01-02 PROCEDURE — 2580000003 HC RX 258: Performed by: HOSPITALIST

## 2024-01-02 PROCEDURE — 2060000000 HC ICU INTERMEDIATE R&B

## 2024-01-02 PROCEDURE — 2580000003 HC RX 258: Performed by: INTERNAL MEDICINE

## 2024-01-02 PROCEDURE — 6370000000 HC RX 637 (ALT 250 FOR IP): Performed by: NURSE PRACTITIONER

## 2024-01-02 PROCEDURE — 99233 SBSQ HOSP IP/OBS HIGH 50: CPT | Performed by: INTERNAL MEDICINE

## 2024-01-02 PROCEDURE — 85014 HEMATOCRIT: CPT

## 2024-01-02 PROCEDURE — 71045 X-RAY EXAM CHEST 1 VIEW: CPT

## 2024-01-02 PROCEDURE — 6360000002 HC RX W HCPCS

## 2024-01-02 PROCEDURE — 82330 ASSAY OF CALCIUM: CPT

## 2024-01-02 PROCEDURE — 2580000003 HC RX 258: Performed by: STUDENT IN AN ORGANIZED HEALTH CARE EDUCATION/TRAINING PROGRAM

## 2024-01-02 PROCEDURE — 2700000000 HC OXYGEN THERAPY PER DAY

## 2024-01-02 PROCEDURE — 36600 WITHDRAWAL OF ARTERIAL BLOOD: CPT

## 2024-01-02 PROCEDURE — 83605 ASSAY OF LACTIC ACID: CPT

## 2024-01-02 PROCEDURE — 85379 FIBRIN DEGRADATION QUANT: CPT

## 2024-01-02 PROCEDURE — 93010 ELECTROCARDIOGRAM REPORT: CPT | Performed by: INTERNAL MEDICINE

## 2024-01-02 PROCEDURE — 80202 ASSAY OF VANCOMYCIN: CPT

## 2024-01-02 PROCEDURE — 94669 MECHANICAL CHEST WALL OSCILL: CPT

## 2024-01-02 RX ORDER — INSULIN LISPRO 100 [IU]/ML
0-8 INJECTION, SOLUTION INTRAVENOUS; SUBCUTANEOUS
Status: DISCONTINUED | OUTPATIENT
Start: 2024-01-02 | End: 2024-01-05

## 2024-01-02 RX ORDER — DIAZEPAM 5 MG/ML
2.5 INJECTION, SOLUTION INTRAMUSCULAR; INTRAVENOUS ONCE
Status: COMPLETED | OUTPATIENT
Start: 2024-01-02 | End: 2024-01-02

## 2024-01-02 RX ORDER — FUROSEMIDE 10 MG/ML
20 INJECTION INTRAMUSCULAR; INTRAVENOUS ONCE
Status: COMPLETED | OUTPATIENT
Start: 2024-01-02 | End: 2024-01-02

## 2024-01-02 RX ORDER — INSULIN LISPRO 100 [IU]/ML
0-4 INJECTION, SOLUTION INTRAVENOUS; SUBCUTANEOUS NIGHTLY
Status: DISCONTINUED | OUTPATIENT
Start: 2024-01-02 | End: 2024-01-05

## 2024-01-02 RX ORDER — FUROSEMIDE 10 MG/ML
INJECTION INTRAMUSCULAR; INTRAVENOUS
Status: COMPLETED
Start: 2024-01-02 | End: 2024-01-02

## 2024-01-02 RX ORDER — DEXAMETHASONE SODIUM PHOSPHATE 10 MG/ML
10 INJECTION INTRAMUSCULAR; INTRAVENOUS EVERY 24 HOURS
Status: DISCONTINUED | OUTPATIENT
Start: 2024-01-02 | End: 2024-01-07 | Stop reason: HOSPADM

## 2024-01-02 RX ADMIN — DULOXETINE HYDROCHLORIDE 60 MG: 60 CAPSULE, DELAYED RELEASE ORAL at 20:25

## 2024-01-02 RX ADMIN — METOPROLOL SUCCINATE 25 MG: 25 TABLET, EXTENDED RELEASE ORAL at 08:19

## 2024-01-02 RX ADMIN — MORPHINE SULFATE 15 MG: 15 TABLET, FILM COATED, EXTENDED RELEASE ORAL at 08:19

## 2024-01-02 RX ADMIN — HYDROXYZINE HYDROCHLORIDE 25 MG: 10 TABLET ORAL at 06:43

## 2024-01-02 RX ADMIN — PANTOPRAZOLE SODIUM 40 MG: 40 TABLET, DELAYED RELEASE ORAL at 05:34

## 2024-01-02 RX ADMIN — INSULIN GLARGINE 12 UNITS: 100 INJECTION, SOLUTION SUBCUTANEOUS at 20:25

## 2024-01-02 RX ADMIN — INSULIN LISPRO 5 UNITS: 100 INJECTION, SOLUTION INTRAVENOUS; SUBCUTANEOUS at 11:33

## 2024-01-02 RX ADMIN — INSULIN LISPRO 2 UNITS: 100 INJECTION, SOLUTION INTRAVENOUS; SUBCUTANEOUS at 11:33

## 2024-01-02 RX ADMIN — HYDROXYZINE HYDROCHLORIDE 25 MG: 10 TABLET ORAL at 20:25

## 2024-01-02 RX ADMIN — SODIUM CHLORIDE, PRESERVATIVE FREE 10 ML: 5 INJECTION INTRAVENOUS at 20:36

## 2024-01-02 RX ADMIN — GUAIFENESIN AND DEXTROMETHORPHAN 5 ML: 100; 10 SYRUP ORAL at 04:09

## 2024-01-02 RX ADMIN — CLOPIDOGREL BISULFATE 75 MG: 75 TABLET ORAL at 08:19

## 2024-01-02 RX ADMIN — ACETAMINOPHEN 650 MG: 325 TABLET ORAL at 20:25

## 2024-01-02 RX ADMIN — Medication 2 PUFF: at 07:52

## 2024-01-02 RX ADMIN — GABAPENTIN 300 MG: 300 CAPSULE ORAL at 08:18

## 2024-01-02 RX ADMIN — MEROPENEM 1000 MG: 1 INJECTION, POWDER, FOR SOLUTION INTRAVENOUS at 12:39

## 2024-01-02 RX ADMIN — DIAZEPAM 2.5 MG: 5 INJECTION, SOLUTION INTRAMUSCULAR; INTRAVENOUS at 09:11

## 2024-01-02 RX ADMIN — TIOTROPIUM BROMIDE INHALATION SPRAY 2 PUFF: 3.12 SPRAY, METERED RESPIRATORY (INHALATION) at 07:52

## 2024-01-02 RX ADMIN — DEXAMETHASONE SODIUM PHOSPHATE 10 MG: 10 INJECTION INTRAMUSCULAR; INTRAVENOUS at 13:51

## 2024-01-02 RX ADMIN — MEROPENEM 1000 MG: 1 INJECTION, POWDER, FOR SOLUTION INTRAVENOUS at 20:34

## 2024-01-02 RX ADMIN — GUAIFENESIN 600 MG: 600 TABLET ORAL at 20:25

## 2024-01-02 RX ADMIN — GUAIFENESIN 600 MG: 600 TABLET ORAL at 08:19

## 2024-01-02 RX ADMIN — Medication 2 PUFF: at 20:44

## 2024-01-02 RX ADMIN — Medication 16 G: at 14:15

## 2024-01-02 RX ADMIN — ASPIRIN 81 MG: 81 TABLET, CHEWABLE ORAL at 08:19

## 2024-01-02 RX ADMIN — INSULIN LISPRO 5 UNITS: 100 INJECTION, SOLUTION INTRAVENOUS; SUBCUTANEOUS at 08:32

## 2024-01-02 RX ADMIN — FUROSEMIDE 20 MG: 10 INJECTION, SOLUTION INTRAMUSCULAR; INTRAVENOUS at 09:44

## 2024-01-02 RX ADMIN — FLUDROCORTISONE ACETATE 0.1 MG: 0.1 TABLET ORAL at 08:38

## 2024-01-02 RX ADMIN — DEXAMETHASONE 6 MG: 4 TABLET ORAL at 08:18

## 2024-01-02 RX ADMIN — DULOXETINE HYDROCHLORIDE 60 MG: 60 CAPSULE, DELAYED RELEASE ORAL at 08:19

## 2024-01-02 RX ADMIN — INSULIN LISPRO 1 UNITS: 100 INJECTION, SOLUTION INTRAVENOUS; SUBCUTANEOUS at 08:31

## 2024-01-02 RX ADMIN — ACETAMINOPHEN 650 MG: 325 TABLET ORAL at 14:47

## 2024-01-02 RX ADMIN — GABAPENTIN 300 MG: 300 CAPSULE ORAL at 20:24

## 2024-01-02 RX ADMIN — ATORVASTATIN CALCIUM 40 MG: 40 TABLET, FILM COATED ORAL at 20:25

## 2024-01-02 RX ADMIN — ENOXAPARIN SODIUM 40 MG: 100 INJECTION SUBCUTANEOUS at 08:19

## 2024-01-02 RX ADMIN — INSULIN GLARGINE 12 UNITS: 100 INJECTION, SOLUTION SUBCUTANEOUS at 08:31

## 2024-01-02 RX ADMIN — VANCOMYCIN HYDROCHLORIDE 750 MG: 750 INJECTION, POWDER, LYOPHILIZED, FOR SOLUTION INTRAVENOUS at 05:44

## 2024-01-02 RX ADMIN — VANCOMYCIN HYDROCHLORIDE 750 MG: 750 INJECTION, POWDER, LYOPHILIZED, FOR SOLUTION INTRAVENOUS at 17:16

## 2024-01-02 RX ADMIN — FUROSEMIDE 20 MG: 10 INJECTION INTRAMUSCULAR; INTRAVENOUS at 09:44

## 2024-01-02 ASSESSMENT — PAIN SCALES - GENERAL
PAINLEVEL_OUTOF10: 10
PAINLEVEL_OUTOF10: 8

## 2024-01-02 ASSESSMENT — PAIN DESCRIPTION - LOCATION: LOCATION: BACK

## 2024-01-02 NOTE — PLAN OF CARE
Problem: Pain  Goal: Verbalizes/displays adequate comfort level or baseline comfort level  Outcome: Progressing     Problem: Discharge Planning  Goal: Discharge to home or other facility with appropriate resources  Outcome: Progressing     Problem: Safety - Adult  Goal: Free from fall injury  Outcome: Progressing     Problem: Skin/Tissue Integrity  Goal: Absence of new skin breakdown  Description: 1.  Monitor for areas of redness and/or skin breakdown  2.  Assess vascular access sites hourly  3.  Every 4-6 hours minimum:  Change oxygen saturation probe site  4.  Every 4-6 hours:  If on nasal continuous positive airway pressure, respiratory therapy assess nares and determine need for appliance change or resting period.  Outcome: Progressing     Problem: Chronic Conditions and Co-morbidities  Goal: Patient's chronic conditions and co-morbidity symptoms are monitored and maintained or improved  Outcome: Progressing     Problem: Nutrition Deficit:  Goal: Optimize nutritional status  Outcome: Progressing

## 2024-01-02 NOTE — ACP (ADVANCE CARE PLANNING)
Advance Care Planning     General Advance Care Planning (ACP) Conversation    Date of Conversation: 12/29/2023  Conducted with: Patient with Decision Making Capacity    Healthcare Decision Maker:    Primary Decision Maker: Teena Guerra - Child - [phone]    Secondary Decision Maker: JaidademarioTaryn vargas - Child - [phone]  Click here to complete Healthcare Decision Makers including selection of the Healthcare Decision Maker Relationship (ie \"Primary\").  Today we documented Decision Maker(s) consistent with Legal Next of Kin hierarchy.    Content/Action Overview:  Has ACP document(s) NOT on file - requested patient to provide  Reviewed DNR/DNI and patient elects Full Code (Attempt Resuscitation)  Length of Voluntary ACP Conversation in minutes:  <16 minutes (Non-Billable)    Amelia Yuen RN

## 2024-01-02 NOTE — CARE COORDINATION
Case Management Assessment  Initial Evaluation    Date/Time of Evaluation: 1/2/2024 3:17 PM  Assessment Completed by: Amelia Yuen RN    If patient is discharged prior to next notation, then this note serves as note for discharge by case management.    Patient Name: Gris Taveras                   YOB: 1950  Diagnosis: Severe sepsis (HCC) [A41.9, R65.20]  COVID [U07.1]                   Date / Time: 12/29/2023 12:00 PM    Patient Admission Status: Inpatient   Readmission Risk (Low < 19, Mod (19-27), High > 27): Readmission Risk Score: 34.3    Current PCP: Armando Burroughs MD  PCP verified by CM? Yes    Chart Reviewed: Yes      History Provided by: Patient, Child/Family  Patient Orientation: Alert and Oriented, Person, Place, Situation, Self    Patient Cognition: Alert    Hospitalization in the last 30 days (Readmission):  No    If yes, Readmission Assessment in  Navigator will be completed.    Advance Directives:      Code Status: Full Code   Patient's Primary Decision Maker is: Legal Next of Kin    Primary Decision Maker: Teena Guerra - Child - [phone]    Secondary Decision Maker: Taryn Page - Child - [phone]    Discharge Planning:    Patient lives with: Alone Type of Home: House  Primary Care Giver: Self  Patient Support Systems include: Children, Family Members   Current Financial resources: Medicare  Current community resources: None  Current services prior to admission: Durable Medical Equipment            Current DME: Walker, Cane, Wheelchair, Oxygen Therapy (Comment) ([facility])            Type of Home Care services:  OT, PT, Nursing Services    ADLS  Prior functional level: Assistance with the following:, Cooking, Housework, Shopping  Current functional level:      PT AM-PAC: 17 /24  OT AM-PAC: 18 /24    Family can provide assistance at DC: Yes  Would you like Case Management to discuss the discharge plan with any other family members/significant others, and if so, who? Yes

## 2024-01-03 LAB
GLUCOSE BLD-MCNC: 121 MG/DL (ref 70–99)
GLUCOSE BLD-MCNC: 185 MG/DL (ref 70–99)
GLUCOSE BLD-MCNC: 245 MG/DL (ref 70–99)
GLUCOSE BLD-MCNC: 249 MG/DL (ref 70–99)
GLUCOSE BLD-MCNC: 262 MG/DL (ref 70–99)
GLUCOSE BLD-MCNC: 272 MG/DL (ref 70–99)
PERFORMED ON: ABNORMAL
VANCOMYCIN SERPL-MCNC: 19.7 UG/ML

## 2024-01-03 PROCEDURE — 94761 N-INVAS EAR/PLS OXIMETRY MLT: CPT

## 2024-01-03 PROCEDURE — 6370000000 HC RX 637 (ALT 250 FOR IP): Performed by: NURSE PRACTITIONER

## 2024-01-03 PROCEDURE — 2060000000 HC ICU INTERMEDIATE R&B

## 2024-01-03 PROCEDURE — 6360000002 HC RX W HCPCS: Performed by: STUDENT IN AN ORGANIZED HEALTH CARE EDUCATION/TRAINING PROGRAM

## 2024-01-03 PROCEDURE — 2580000003 HC RX 258: Performed by: INTERNAL MEDICINE

## 2024-01-03 PROCEDURE — 94640 AIRWAY INHALATION TREATMENT: CPT

## 2024-01-03 PROCEDURE — 6360000002 HC RX W HCPCS: Performed by: HOSPITALIST

## 2024-01-03 PROCEDURE — 36415 COLL VENOUS BLD VENIPUNCTURE: CPT

## 2024-01-03 PROCEDURE — 6370000000 HC RX 637 (ALT 250 FOR IP): Performed by: STUDENT IN AN ORGANIZED HEALTH CARE EDUCATION/TRAINING PROGRAM

## 2024-01-03 PROCEDURE — 2580000003 HC RX 258: Performed by: STUDENT IN AN ORGANIZED HEALTH CARE EDUCATION/TRAINING PROGRAM

## 2024-01-03 PROCEDURE — 97530 THERAPEUTIC ACTIVITIES: CPT

## 2024-01-03 PROCEDURE — 2580000003 HC RX 258: Performed by: HOSPITALIST

## 2024-01-03 PROCEDURE — 97535 SELF CARE MNGMENT TRAINING: CPT

## 2024-01-03 PROCEDURE — 99233 SBSQ HOSP IP/OBS HIGH 50: CPT | Performed by: INTERNAL MEDICINE

## 2024-01-03 PROCEDURE — 6360000002 HC RX W HCPCS: Performed by: INTERNAL MEDICINE

## 2024-01-03 PROCEDURE — 80202 ASSAY OF VANCOMYCIN: CPT

## 2024-01-03 PROCEDURE — 94669 MECHANICAL CHEST WALL OSCILL: CPT

## 2024-01-03 PROCEDURE — 2700000000 HC OXYGEN THERAPY PER DAY

## 2024-01-03 PROCEDURE — 6370000000 HC RX 637 (ALT 250 FOR IP): Performed by: HOSPITALIST

## 2024-01-03 RX ORDER — INSULIN GLARGINE 100 [IU]/ML
15 INJECTION, SOLUTION SUBCUTANEOUS 2 TIMES DAILY
Status: DISCONTINUED | OUTPATIENT
Start: 2024-01-03 | End: 2024-01-04

## 2024-01-03 RX ADMIN — DULOXETINE HYDROCHLORIDE 60 MG: 60 CAPSULE, DELAYED RELEASE ORAL at 09:28

## 2024-01-03 RX ADMIN — MEROPENEM 1000 MG: 1 INJECTION, POWDER, FOR SOLUTION INTRAVENOUS at 12:49

## 2024-01-03 RX ADMIN — INSULIN LISPRO 5 UNITS: 100 INJECTION, SOLUTION INTRAVENOUS; SUBCUTANEOUS at 17:47

## 2024-01-03 RX ADMIN — ENOXAPARIN SODIUM 40 MG: 100 INJECTION SUBCUTANEOUS at 09:27

## 2024-01-03 RX ADMIN — METOPROLOL SUCCINATE 25 MG: 25 TABLET, EXTENDED RELEASE ORAL at 09:29

## 2024-01-03 RX ADMIN — GABAPENTIN 300 MG: 300 CAPSULE ORAL at 20:36

## 2024-01-03 RX ADMIN — Medication 3 MG: at 21:31

## 2024-01-03 RX ADMIN — BENZONATATE 100 MG: 100 CAPSULE ORAL at 16:13

## 2024-01-03 RX ADMIN — INSULIN GLARGINE 12 UNITS: 100 INJECTION, SOLUTION SUBCUTANEOUS at 09:31

## 2024-01-03 RX ADMIN — INSULIN LISPRO 2 UNITS: 100 INJECTION, SOLUTION INTRAVENOUS; SUBCUTANEOUS at 09:31

## 2024-01-03 RX ADMIN — BENZONATATE 100 MG: 100 CAPSULE ORAL at 21:31

## 2024-01-03 RX ADMIN — PANTOPRAZOLE SODIUM 40 MG: 40 TABLET, DELAYED RELEASE ORAL at 05:25

## 2024-01-03 RX ADMIN — DULOXETINE HYDROCHLORIDE 60 MG: 60 CAPSULE, DELAYED RELEASE ORAL at 20:36

## 2024-01-03 RX ADMIN — CLOPIDOGREL BISULFATE 75 MG: 75 TABLET ORAL at 09:29

## 2024-01-03 RX ADMIN — HYDROXYZINE HYDROCHLORIDE 25 MG: 10 TABLET ORAL at 04:12

## 2024-01-03 RX ADMIN — FLUDROCORTISONE ACETATE 0.1 MG: 0.1 TABLET ORAL at 09:38

## 2024-01-03 RX ADMIN — INSULIN LISPRO 5 UNITS: 100 INJECTION, SOLUTION INTRAVENOUS; SUBCUTANEOUS at 09:31

## 2024-01-03 RX ADMIN — VANCOMYCIN HYDROCHLORIDE 750 MG: 750 INJECTION, POWDER, LYOPHILIZED, FOR SOLUTION INTRAVENOUS at 05:37

## 2024-01-03 RX ADMIN — BENZONATATE 100 MG: 100 CAPSULE ORAL at 09:28

## 2024-01-03 RX ADMIN — GUAIFENESIN 600 MG: 600 TABLET ORAL at 20:36

## 2024-01-03 RX ADMIN — MORPHINE SULFATE 15 MG: 15 TABLET, FILM COATED, EXTENDED RELEASE ORAL at 20:36

## 2024-01-03 RX ADMIN — INSULIN GLARGINE 15 UNITS: 100 INJECTION, SOLUTION SUBCUTANEOUS at 21:27

## 2024-01-03 RX ADMIN — INSULIN LISPRO 4 UNITS: 100 INJECTION, SOLUTION INTRAVENOUS; SUBCUTANEOUS at 12:51

## 2024-01-03 RX ADMIN — DEXAMETHASONE SODIUM PHOSPHATE 10 MG: 10 INJECTION INTRAMUSCULAR; INTRAVENOUS at 12:50

## 2024-01-03 RX ADMIN — SODIUM CHLORIDE, PRESERVATIVE FREE 10 ML: 5 INJECTION INTRAVENOUS at 09:31

## 2024-01-03 RX ADMIN — Medication 2 PUFF: at 20:28

## 2024-01-03 RX ADMIN — GUAIFENESIN AND DEXTROMETHORPHAN 5 ML: 100; 10 SYRUP ORAL at 16:13

## 2024-01-03 RX ADMIN — GUAIFENESIN 600 MG: 600 TABLET ORAL at 09:28

## 2024-01-03 RX ADMIN — VANCOMYCIN HYDROCHLORIDE 1000 MG: 1 INJECTION, POWDER, LYOPHILIZED, FOR SOLUTION INTRAVENOUS at 23:52

## 2024-01-03 RX ADMIN — MEROPENEM 1000 MG: 1 INJECTION, POWDER, FOR SOLUTION INTRAVENOUS at 20:44

## 2024-01-03 RX ADMIN — ATORVASTATIN CALCIUM 40 MG: 40 TABLET, FILM COATED ORAL at 20:36

## 2024-01-03 RX ADMIN — TIOTROPIUM BROMIDE INHALATION SPRAY 2 PUFF: 3.12 SPRAY, METERED RESPIRATORY (INHALATION) at 08:00

## 2024-01-03 RX ADMIN — HYDROXYZINE HYDROCHLORIDE 25 MG: 10 TABLET ORAL at 21:27

## 2024-01-03 RX ADMIN — INSULIN LISPRO 5 UNITS: 100 INJECTION, SOLUTION INTRAVENOUS; SUBCUTANEOUS at 12:51

## 2024-01-03 RX ADMIN — MORPHINE SULFATE 15 MG: 15 TABLET, FILM COATED, EXTENDED RELEASE ORAL at 09:29

## 2024-01-03 RX ADMIN — GABAPENTIN 300 MG: 300 CAPSULE ORAL at 09:28

## 2024-01-03 RX ADMIN — ASPIRIN 81 MG: 81 TABLET, CHEWABLE ORAL at 09:30

## 2024-01-03 RX ADMIN — OXYCODONE AND ACETAMINOPHEN 1 TABLET: 10; 325 TABLET ORAL at 16:13

## 2024-01-03 RX ADMIN — HYDROXYZINE HYDROCHLORIDE 25 MG: 10 TABLET ORAL at 09:28

## 2024-01-03 RX ADMIN — MEROPENEM 1000 MG: 1 INJECTION, POWDER, FOR SOLUTION INTRAVENOUS at 04:15

## 2024-01-03 RX ADMIN — Medication 2 PUFF: at 08:00

## 2024-01-03 NOTE — CARE COORDINATION
CM update note. PD # 5 Covid pneumonia.  Lives alone. Patient has SNF recs. This writer spoke with daughter Teena regarding discharge plans . Family will decline SNF. They plan to bring Gris home and provide 24/7 care. Will agree to HHC if needed.    Will continue to follow.      Ann Rogers RN

## 2024-01-04 LAB
ALBUMIN SERPL-MCNC: 2.8 G/DL (ref 3.4–5)
ALBUMIN/GLOB SERPL: 0.8 {RATIO} (ref 1.1–2.2)
ALP SERPL-CCNC: 57 U/L (ref 40–129)
ALT SERPL-CCNC: 16 U/L (ref 10–40)
ANION GAP SERPL CALCULATED.3IONS-SCNC: 11 MMOL/L (ref 3–16)
AST SERPL-CCNC: 18 U/L (ref 15–37)
BASOPHILS # BLD: 0 K/UL (ref 0–0.2)
BASOPHILS NFR BLD: 0.1 %
BILIRUB SERPL-MCNC: 0.4 MG/DL (ref 0–1)
BUN SERPL-MCNC: 23 MG/DL (ref 7–20)
CALCIUM SERPL-MCNC: 8.5 MG/DL (ref 8.3–10.6)
CHLORIDE SERPL-SCNC: 98 MMOL/L (ref 99–110)
CO2 SERPL-SCNC: 22 MMOL/L (ref 21–32)
CREAT SERPL-MCNC: 1 MG/DL (ref 0.6–1.2)
DEPRECATED RDW RBC AUTO: 15.9 % (ref 12.4–15.4)
EOSINOPHIL # BLD: 0 K/UL (ref 0–0.6)
EOSINOPHIL NFR BLD: 0 %
GFR SERPLBLD CREATININE-BSD FMLA CKD-EPI: 60 ML/MIN/{1.73_M2}
GLUCOSE BLD-MCNC: 305 MG/DL (ref 70–99)
GLUCOSE BLD-MCNC: 331 MG/DL (ref 70–99)
GLUCOSE BLD-MCNC: 331 MG/DL (ref 70–99)
GLUCOSE BLD-MCNC: 445 MG/DL (ref 70–99)
GLUCOSE SERPL-MCNC: 257 MG/DL (ref 70–99)
HCT VFR BLD AUTO: 31.9 % (ref 36–48)
HGB BLD-MCNC: 10.2 G/DL (ref 12–16)
LYMPHOCYTES # BLD: 0.6 K/UL (ref 1–5.1)
LYMPHOCYTES NFR BLD: 6.6 %
MCH RBC QN AUTO: 28 PG (ref 26–34)
MCHC RBC AUTO-ENTMCNC: 32.2 G/DL (ref 31–36)
MCV RBC AUTO: 87 FL (ref 80–100)
MONOCYTES # BLD: 0.3 K/UL (ref 0–1.3)
MONOCYTES NFR BLD: 3.6 %
NEUTROPHILS # BLD: 8 K/UL (ref 1.7–7.7)
NEUTROPHILS NFR BLD: 89.7 %
PERFORMED ON: ABNORMAL
PLATELET # BLD AUTO: 200 K/UL (ref 135–450)
PMV BLD AUTO: 7.8 FL (ref 5–10.5)
POTASSIUM SERPL-SCNC: 4.3 MMOL/L (ref 3.5–5.1)
PROT SERPL-MCNC: 6.3 G/DL (ref 6.4–8.2)
RBC # BLD AUTO: 3.66 M/UL (ref 4–5.2)
SODIUM SERPL-SCNC: 131 MMOL/L (ref 136–145)
VANCOMYCIN SERPL-MCNC: 18.3 UG/ML
WBC # BLD AUTO: 8.9 K/UL (ref 4–11)

## 2024-01-04 PROCEDURE — 85025 COMPLETE CBC W/AUTO DIFF WBC: CPT

## 2024-01-04 PROCEDURE — 80053 COMPREHEN METABOLIC PANEL: CPT

## 2024-01-04 PROCEDURE — 94669 MECHANICAL CHEST WALL OSCILL: CPT

## 2024-01-04 PROCEDURE — 36415 COLL VENOUS BLD VENIPUNCTURE: CPT

## 2024-01-04 PROCEDURE — 6370000000 HC RX 637 (ALT 250 FOR IP): Performed by: HOSPITALIST

## 2024-01-04 PROCEDURE — 97530 THERAPEUTIC ACTIVITIES: CPT

## 2024-01-04 PROCEDURE — 2580000003 HC RX 258: Performed by: STUDENT IN AN ORGANIZED HEALTH CARE EDUCATION/TRAINING PROGRAM

## 2024-01-04 PROCEDURE — 6370000000 HC RX 637 (ALT 250 FOR IP): Performed by: STUDENT IN AN ORGANIZED HEALTH CARE EDUCATION/TRAINING PROGRAM

## 2024-01-04 PROCEDURE — 99233 SBSQ HOSP IP/OBS HIGH 50: CPT | Performed by: INTERNAL MEDICINE

## 2024-01-04 PROCEDURE — 97535 SELF CARE MNGMENT TRAINING: CPT

## 2024-01-04 PROCEDURE — 6360000002 HC RX W HCPCS: Performed by: STUDENT IN AN ORGANIZED HEALTH CARE EDUCATION/TRAINING PROGRAM

## 2024-01-04 PROCEDURE — 2580000003 HC RX 258: Performed by: INTERNAL MEDICINE

## 2024-01-04 PROCEDURE — 94761 N-INVAS EAR/PLS OXIMETRY MLT: CPT

## 2024-01-04 PROCEDURE — 94640 AIRWAY INHALATION TREATMENT: CPT

## 2024-01-04 PROCEDURE — 80202 ASSAY OF VANCOMYCIN: CPT

## 2024-01-04 PROCEDURE — 2060000000 HC ICU INTERMEDIATE R&B

## 2024-01-04 PROCEDURE — 6360000002 HC RX W HCPCS: Performed by: INTERNAL MEDICINE

## 2024-01-04 PROCEDURE — 2700000000 HC OXYGEN THERAPY PER DAY

## 2024-01-04 RX ORDER — QUETIAPINE FUMARATE 25 MG/1
25 TABLET, FILM COATED ORAL NIGHTLY
Status: DISCONTINUED | OUTPATIENT
Start: 2024-01-04 | End: 2024-01-07 | Stop reason: HOSPADM

## 2024-01-04 RX ORDER — INSULIN GLARGINE 100 [IU]/ML
20 INJECTION, SOLUTION SUBCUTANEOUS 2 TIMES DAILY
Status: DISCONTINUED | OUTPATIENT
Start: 2024-01-04 | End: 2024-01-05

## 2024-01-04 RX ADMIN — GUAIFENESIN 600 MG: 600 TABLET ORAL at 09:56

## 2024-01-04 RX ADMIN — GABAPENTIN 300 MG: 300 CAPSULE ORAL at 20:33

## 2024-01-04 RX ADMIN — INSULIN LISPRO 5 UNITS: 100 INJECTION, SOLUTION INTRAVENOUS; SUBCUTANEOUS at 12:39

## 2024-01-04 RX ADMIN — TIOTROPIUM BROMIDE INHALATION SPRAY 2 PUFF: 3.12 SPRAY, METERED RESPIRATORY (INHALATION) at 08:41

## 2024-01-04 RX ADMIN — SODIUM CHLORIDE, PRESERVATIVE FREE 10 ML: 5 INJECTION INTRAVENOUS at 09:55

## 2024-01-04 RX ADMIN — Medication 2 PUFF: at 20:42

## 2024-01-04 RX ADMIN — METOPROLOL SUCCINATE 25 MG: 25 TABLET, EXTENDED RELEASE ORAL at 09:55

## 2024-01-04 RX ADMIN — BENZONATATE 100 MG: 100 CAPSULE ORAL at 20:31

## 2024-01-04 RX ADMIN — INSULIN LISPRO 6 UNITS: 100 INJECTION, SOLUTION INTRAVENOUS; SUBCUTANEOUS at 09:55

## 2024-01-04 RX ADMIN — FLUDROCORTISONE ACETATE 0.1 MG: 0.1 TABLET ORAL at 09:56

## 2024-01-04 RX ADMIN — CLOPIDOGREL BISULFATE 75 MG: 75 TABLET ORAL at 09:56

## 2024-01-04 RX ADMIN — DULOXETINE HYDROCHLORIDE 60 MG: 60 CAPSULE, DELAYED RELEASE ORAL at 09:56

## 2024-01-04 RX ADMIN — MORPHINE SULFATE 15 MG: 15 TABLET, FILM COATED, EXTENDED RELEASE ORAL at 09:55

## 2024-01-04 RX ADMIN — INSULIN LISPRO 8 UNITS: 100 INJECTION, SOLUTION INTRAVENOUS; SUBCUTANEOUS at 12:39

## 2024-01-04 RX ADMIN — MEROPENEM 1000 MG: 1 INJECTION, POWDER, FOR SOLUTION INTRAVENOUS at 14:03

## 2024-01-04 RX ADMIN — VANCOMYCIN HYDROCHLORIDE 750 MG: 750 INJECTION, POWDER, LYOPHILIZED, FOR SOLUTION INTRAVENOUS at 23:51

## 2024-01-04 RX ADMIN — MEROPENEM 1000 MG: 1 INJECTION, POWDER, FOR SOLUTION INTRAVENOUS at 05:25

## 2024-01-04 RX ADMIN — ENOXAPARIN SODIUM 40 MG: 100 INJECTION SUBCUTANEOUS at 09:56

## 2024-01-04 RX ADMIN — INSULIN LISPRO 5 UNITS: 100 INJECTION, SOLUTION INTRAVENOUS; SUBCUTANEOUS at 18:02

## 2024-01-04 RX ADMIN — DEXAMETHASONE SODIUM PHOSPHATE 10 MG: 10 INJECTION INTRAMUSCULAR; INTRAVENOUS at 14:01

## 2024-01-04 RX ADMIN — INSULIN GLARGINE 20 UNITS: 100 INJECTION, SOLUTION SUBCUTANEOUS at 20:34

## 2024-01-04 RX ADMIN — OXYCODONE AND ACETAMINOPHEN 1 TABLET: 10; 325 TABLET ORAL at 15:31

## 2024-01-04 RX ADMIN — ATORVASTATIN CALCIUM 40 MG: 40 TABLET, FILM COATED ORAL at 20:31

## 2024-01-04 RX ADMIN — Medication 2 PUFF: at 08:41

## 2024-01-04 RX ADMIN — MEROPENEM 1000 MG: 1 INJECTION, POWDER, FOR SOLUTION INTRAVENOUS at 20:38

## 2024-01-04 RX ADMIN — ASPIRIN 81 MG: 81 TABLET, CHEWABLE ORAL at 09:56

## 2024-01-04 RX ADMIN — GABAPENTIN 300 MG: 300 CAPSULE ORAL at 09:57

## 2024-01-04 RX ADMIN — INSULIN LISPRO 4 UNITS: 100 INJECTION, SOLUTION INTRAVENOUS; SUBCUTANEOUS at 20:34

## 2024-01-04 RX ADMIN — INSULIN LISPRO 6 UNITS: 100 INJECTION, SOLUTION INTRAVENOUS; SUBCUTANEOUS at 18:02

## 2024-01-04 RX ADMIN — QUETIAPINE FUMARATE 25 MG: 25 TABLET ORAL at 20:31

## 2024-01-04 RX ADMIN — INSULIN LISPRO 5 UNITS: 100 INJECTION, SOLUTION INTRAVENOUS; SUBCUTANEOUS at 09:57

## 2024-01-04 RX ADMIN — GUAIFENESIN 600 MG: 600 TABLET ORAL at 20:31

## 2024-01-04 RX ADMIN — MORPHINE SULFATE 15 MG: 15 TABLET, FILM COATED, EXTENDED RELEASE ORAL at 20:33

## 2024-01-04 RX ADMIN — PANTOPRAZOLE SODIUM 40 MG: 40 TABLET, DELAYED RELEASE ORAL at 05:24

## 2024-01-04 RX ADMIN — DULOXETINE HYDROCHLORIDE 60 MG: 60 CAPSULE, DELAYED RELEASE ORAL at 20:34

## 2024-01-04 ASSESSMENT — PAIN DESCRIPTION - LOCATION: LOCATION: BACK

## 2024-01-04 ASSESSMENT — PAIN DESCRIPTION - DESCRIPTORS: DESCRIPTORS: ACHING

## 2024-01-04 NOTE — PLAN OF CARE
Problem: Pain  Goal: Verbalizes/displays adequate comfort level or baseline comfort level  Outcome: Progressing     Problem: Discharge Planning  Goal: Discharge to home or other facility with appropriate resources  Outcome: Progressing     Problem: Safety - Adult  Goal: Free from fall injury  Outcome: Progressing     Problem: Skin/Tissue Integrity  Goal: Absence of new skin breakdown  Description: 1.  Monitor for areas of redness and/or skin breakdown  2.  Assess vascular access sites hourly  3.  Every 4-6 hours minimum:  Change oxygen saturation probe site  4.  Every 4-6 hours:  If on nasal continuous positive airway pressure, respiratory therapy assess nares and determine need for appliance change or resting period.  Outcome: Progressing     Problem: Chronic Conditions and Co-morbidities  Goal: Patient's chronic conditions and co-morbidity symptoms are monitored and maintained or improved  Outcome: Progressing     Problem: Nutrition Deficit:  Goal: Optimize nutritional status  Outcome: Progressing     Problem: ABCDS Injury Assessment  Goal: Absence of physical injury  Outcome: Progressing

## 2024-01-05 LAB
GLUCOSE BLD-MCNC: 173 MG/DL (ref 70–99)
GLUCOSE BLD-MCNC: 174 MG/DL (ref 70–99)
GLUCOSE BLD-MCNC: 313 MG/DL (ref 70–99)
GLUCOSE BLD-MCNC: 341 MG/DL (ref 70–99)
PERFORMED ON: ABNORMAL

## 2024-01-05 PROCEDURE — 6370000000 HC RX 637 (ALT 250 FOR IP): Performed by: HOSPITALIST

## 2024-01-05 PROCEDURE — 97110 THERAPEUTIC EXERCISES: CPT

## 2024-01-05 PROCEDURE — 6370000000 HC RX 637 (ALT 250 FOR IP): Performed by: INTERNAL MEDICINE

## 2024-01-05 PROCEDURE — 97530 THERAPEUTIC ACTIVITIES: CPT

## 2024-01-05 PROCEDURE — 6370000000 HC RX 637 (ALT 250 FOR IP): Performed by: STUDENT IN AN ORGANIZED HEALTH CARE EDUCATION/TRAINING PROGRAM

## 2024-01-05 PROCEDURE — 94669 MECHANICAL CHEST WALL OSCILL: CPT

## 2024-01-05 PROCEDURE — 94761 N-INVAS EAR/PLS OXIMETRY MLT: CPT

## 2024-01-05 PROCEDURE — 2580000003 HC RX 258: Performed by: STUDENT IN AN ORGANIZED HEALTH CARE EDUCATION/TRAINING PROGRAM

## 2024-01-05 PROCEDURE — 2060000000 HC ICU INTERMEDIATE R&B

## 2024-01-05 PROCEDURE — 94640 AIRWAY INHALATION TREATMENT: CPT

## 2024-01-05 PROCEDURE — 2700000000 HC OXYGEN THERAPY PER DAY

## 2024-01-05 PROCEDURE — 6360000002 HC RX W HCPCS: Performed by: STUDENT IN AN ORGANIZED HEALTH CARE EDUCATION/TRAINING PROGRAM

## 2024-01-05 PROCEDURE — 99233 SBSQ HOSP IP/OBS HIGH 50: CPT | Performed by: INTERNAL MEDICINE

## 2024-01-05 PROCEDURE — 6360000002 HC RX W HCPCS: Performed by: INTERNAL MEDICINE

## 2024-01-05 PROCEDURE — 97535 SELF CARE MNGMENT TRAINING: CPT

## 2024-01-05 PROCEDURE — 2580000003 HC RX 258: Performed by: INTERNAL MEDICINE

## 2024-01-05 RX ORDER — INSULIN LISPRO 100 [IU]/ML
8 INJECTION, SOLUTION INTRAVENOUS; SUBCUTANEOUS
Status: DISCONTINUED | OUTPATIENT
Start: 2024-01-05 | End: 2024-01-07 | Stop reason: HOSPADM

## 2024-01-05 RX ORDER — INSULIN GLARGINE 100 [IU]/ML
25 INJECTION, SOLUTION SUBCUTANEOUS 2 TIMES DAILY
Status: DISCONTINUED | OUTPATIENT
Start: 2024-01-05 | End: 2024-01-07

## 2024-01-05 RX ORDER — INSULIN LISPRO 100 [IU]/ML
0-4 INJECTION, SOLUTION INTRAVENOUS; SUBCUTANEOUS NIGHTLY
Status: DISCONTINUED | OUTPATIENT
Start: 2024-01-05 | End: 2024-01-05

## 2024-01-05 RX ORDER — INSULIN LISPRO 100 [IU]/ML
0-8 INJECTION, SOLUTION INTRAVENOUS; SUBCUTANEOUS
Status: DISCONTINUED | OUTPATIENT
Start: 2024-01-05 | End: 2024-01-07 | Stop reason: HOSPADM

## 2024-01-05 RX ORDER — INSULIN LISPRO 100 [IU]/ML
0-4 INJECTION, SOLUTION INTRAVENOUS; SUBCUTANEOUS NIGHTLY
Status: DISCONTINUED | OUTPATIENT
Start: 2024-01-05 | End: 2024-01-07 | Stop reason: HOSPADM

## 2024-01-05 RX ORDER — INSULIN LISPRO 100 [IU]/ML
0-16 INJECTION, SOLUTION INTRAVENOUS; SUBCUTANEOUS
Status: DISCONTINUED | OUTPATIENT
Start: 2024-01-05 | End: 2024-01-05

## 2024-01-05 RX ADMIN — VANCOMYCIN HYDROCHLORIDE 750 MG: 750 INJECTION, POWDER, LYOPHILIZED, FOR SOLUTION INTRAVENOUS at 16:18

## 2024-01-05 RX ADMIN — INSULIN LISPRO 6 UNITS: 100 INJECTION, SOLUTION INTRAVENOUS; SUBCUTANEOUS at 09:05

## 2024-01-05 RX ADMIN — INSULIN LISPRO 5 UNITS: 100 INJECTION, SOLUTION INTRAVENOUS; SUBCUTANEOUS at 09:04

## 2024-01-05 RX ADMIN — DULOXETINE HYDROCHLORIDE 60 MG: 60 CAPSULE, DELAYED RELEASE ORAL at 21:08

## 2024-01-05 RX ADMIN — PANTOPRAZOLE SODIUM 40 MG: 40 TABLET, DELAYED RELEASE ORAL at 05:44

## 2024-01-05 RX ADMIN — METOPROLOL SUCCINATE 25 MG: 25 TABLET, EXTENDED RELEASE ORAL at 09:04

## 2024-01-05 RX ADMIN — MEROPENEM 1000 MG: 1 INJECTION, POWDER, FOR SOLUTION INTRAVENOUS at 12:34

## 2024-01-05 RX ADMIN — Medication 2 PUFF: at 07:52

## 2024-01-05 RX ADMIN — INSULIN LISPRO 8 UNITS: 100 INJECTION, SOLUTION INTRAVENOUS; SUBCUTANEOUS at 17:55

## 2024-01-05 RX ADMIN — ENOXAPARIN SODIUM 40 MG: 100 INJECTION SUBCUTANEOUS at 09:03

## 2024-01-05 RX ADMIN — INSULIN GLARGINE 20 UNITS: 100 INJECTION, SOLUTION SUBCUTANEOUS at 09:04

## 2024-01-05 RX ADMIN — DEXAMETHASONE SODIUM PHOSPHATE 10 MG: 10 INJECTION INTRAMUSCULAR; INTRAVENOUS at 15:15

## 2024-01-05 RX ADMIN — INSULIN LISPRO 8 UNITS: 100 INJECTION, SOLUTION INTRAVENOUS; SUBCUTANEOUS at 12:36

## 2024-01-05 RX ADMIN — INSULIN GLARGINE 25 UNITS: 100 INJECTION, SOLUTION SUBCUTANEOUS at 21:08

## 2024-01-05 RX ADMIN — FLUDROCORTISONE ACETATE 0.1 MG: 0.1 TABLET ORAL at 09:13

## 2024-01-05 RX ADMIN — NYSTATIN 500000 UNITS: 100000 SUSPENSION ORAL at 21:08

## 2024-01-05 RX ADMIN — ASPIRIN 81 MG: 81 TABLET, CHEWABLE ORAL at 09:04

## 2024-01-05 RX ADMIN — INSULIN LISPRO 6 UNITS: 100 INJECTION, SOLUTION INTRAVENOUS; SUBCUTANEOUS at 12:36

## 2024-01-05 RX ADMIN — ACETAMINOPHEN 650 MG: 325 TABLET ORAL at 16:25

## 2024-01-05 RX ADMIN — GABAPENTIN 300 MG: 300 CAPSULE ORAL at 09:04

## 2024-01-05 RX ADMIN — MEROPENEM 1000 MG: 1 INJECTION, POWDER, FOR SOLUTION INTRAVENOUS at 05:45

## 2024-01-05 RX ADMIN — Medication 2 PUFF: at 20:23

## 2024-01-05 RX ADMIN — NYSTATIN 500000 UNITS: 100000 SUSPENSION ORAL at 17:55

## 2024-01-05 RX ADMIN — GUAIFENESIN 600 MG: 600 TABLET ORAL at 09:03

## 2024-01-05 RX ADMIN — ATORVASTATIN CALCIUM 40 MG: 40 TABLET, FILM COATED ORAL at 21:07

## 2024-01-05 RX ADMIN — BENZONATATE 100 MG: 100 CAPSULE ORAL at 21:08

## 2024-01-05 RX ADMIN — QUETIAPINE FUMARATE 25 MG: 25 TABLET ORAL at 21:07

## 2024-01-05 RX ADMIN — CLOPIDOGREL BISULFATE 75 MG: 75 TABLET ORAL at 09:04

## 2024-01-05 RX ADMIN — DULOXETINE HYDROCHLORIDE 60 MG: 60 CAPSULE, DELAYED RELEASE ORAL at 09:04

## 2024-01-05 RX ADMIN — TIOTROPIUM BROMIDE INHALATION SPRAY 2 PUFF: 3.12 SPRAY, METERED RESPIRATORY (INHALATION) at 07:52

## 2024-01-05 RX ADMIN — MORPHINE SULFATE 15 MG: 15 TABLET, FILM COATED, EXTENDED RELEASE ORAL at 09:04

## 2024-01-05 RX ADMIN — MORPHINE SULFATE 15 MG: 15 TABLET, FILM COATED, EXTENDED RELEASE ORAL at 21:08

## 2024-01-05 RX ADMIN — GABAPENTIN 300 MG: 300 CAPSULE ORAL at 21:08

## 2024-01-05 RX ADMIN — MEROPENEM 1000 MG: 1 INJECTION, POWDER, FOR SOLUTION INTRAVENOUS at 21:12

## 2024-01-05 RX ADMIN — SODIUM CHLORIDE, PRESERVATIVE FREE 10 ML: 5 INJECTION INTRAVENOUS at 09:05

## 2024-01-05 RX ADMIN — GUAIFENESIN 600 MG: 600 TABLET ORAL at 21:08

## 2024-01-05 ASSESSMENT — PAIN DESCRIPTION - ORIENTATION: ORIENTATION: MID

## 2024-01-05 ASSESSMENT — PAIN DESCRIPTION - LOCATION
LOCATION: GENERALIZED
LOCATION: BACK

## 2024-01-05 ASSESSMENT — PAIN SCALES - GENERAL
PAINLEVEL_OUTOF10: 7
PAINLEVEL_OUTOF10: 10

## 2024-01-05 ASSESSMENT — PAIN DESCRIPTION - DESCRIPTORS: DESCRIPTORS: ACHING

## 2024-01-05 NOTE — CARE COORDINATION
Chart reviewed and case discussed during huddle and rounds. Pt is admitted as inpatient day # 7 for Covid Pna.Pt is from home alone. Pt has SNF recs, which family declines. Plan is to take the Pt home and provide 24/7 care/assist. Pt is active with [facility] for SN for wound care and PT. Plan to resume at d/c. Pt has [facility] for home o2- 2l continuous. Pt on 4lpm now. If Pt d/c's over w/e on 4lpm she will need new walk test and orders for [employer] records, however this will not affect her discharge per Nicole Watkins liasion. The Pt has the DME that she needs at home to accommodate the 4lpm. Walk test template placed in treatment team sticky note, if needed. Follow.

## 2024-01-05 NOTE — PLAN OF CARE
Problem: Pain  Goal: Verbalizes/displays adequate comfort level or baseline comfort level  Outcome: Progressing     Problem: Discharge Planning  Goal: Discharge to home or other facility with appropriate resources  Outcome: Progressing  Flowsheets (Taken 1/4/2024 1030 by Evangelina Floyd, RN)  Discharge to home or other facility with appropriate resources: Identify barriers to discharge with patient and caregiver     Problem: Safety - Adult  Goal: Free from fall injury  Outcome: Progressing     Problem: Skin/Tissue Integrity  Goal: Absence of new skin breakdown  Description: 1.  Monitor for areas of redness and/or skin breakdown  2.  Assess vascular access sites hourly  3.  Every 4-6 hours minimum:  Change oxygen saturation probe site  4.  Every 4-6 hours:  If on nasal continuous positive airway pressure, respiratory therapy assess nares and determine need for appliance change or resting period.  Outcome: Progressing     Problem: Chronic Conditions and Co-morbidities  Goal: Patient's chronic conditions and co-morbidity symptoms are monitored and maintained or improved  Outcome: Progressing  Flowsheets (Taken 1/4/2024 1030 by Evangelina Floyd, RN)  Care Plan - Patient's Chronic Conditions and Co-Morbidity Symptoms are Monitored and Maintained or Improved: Monitor and assess patient's chronic conditions and comorbid symptoms for stability, deterioration, or improvement     Problem: Nutrition Deficit:  Goal: Optimize nutritional status  Outcome: Progressing     Problem: ABCDS Injury Assessment  Goal: Absence of physical injury  Outcome: Progressing

## 2024-01-06 LAB
ANION GAP SERPL CALCULATED.3IONS-SCNC: 9 MMOL/L (ref 3–16)
BASOPHILS # BLD: 0 K/UL (ref 0–0.2)
BASOPHILS NFR BLD: 0.4 %
BUN SERPL-MCNC: 20 MG/DL (ref 7–20)
CALCIUM SERPL-MCNC: 8.9 MG/DL (ref 8.3–10.6)
CHLORIDE SERPL-SCNC: 94 MMOL/L (ref 99–110)
CO2 SERPL-SCNC: 28 MMOL/L (ref 21–32)
CREAT SERPL-MCNC: 0.8 MG/DL (ref 0.6–1.2)
DEPRECATED RDW RBC AUTO: 15.6 % (ref 12.4–15.4)
EOSINOPHIL # BLD: 0 K/UL (ref 0–0.6)
EOSINOPHIL NFR BLD: 0.1 %
GFR SERPLBLD CREATININE-BSD FMLA CKD-EPI: >60 ML/MIN/{1.73_M2}
GLUCOSE BLD-MCNC: 145 MG/DL (ref 70–99)
GLUCOSE BLD-MCNC: 219 MG/DL (ref 70–99)
GLUCOSE BLD-MCNC: 361 MG/DL (ref 70–99)
GLUCOSE BLD-MCNC: 428 MG/DL (ref 70–99)
GLUCOSE SERPL-MCNC: 299 MG/DL (ref 70–99)
HCT VFR BLD AUTO: 31.2 % (ref 36–48)
HGB BLD-MCNC: 10 G/DL (ref 12–16)
LYMPHOCYTES # BLD: 0.6 K/UL (ref 1–5.1)
LYMPHOCYTES NFR BLD: 7.4 %
MCH RBC QN AUTO: 27.3 PG (ref 26–34)
MCHC RBC AUTO-ENTMCNC: 32 G/DL (ref 31–36)
MCV RBC AUTO: 85.4 FL (ref 80–100)
MONOCYTES # BLD: 0.4 K/UL (ref 0–1.3)
MONOCYTES NFR BLD: 4.5 %
NEUTROPHILS # BLD: 7.7 K/UL (ref 1.7–7.7)
NEUTROPHILS NFR BLD: 87.6 %
PERFORMED ON: ABNORMAL
PLATELET # BLD AUTO: 240 K/UL (ref 135–450)
PMV BLD AUTO: 8 FL (ref 5–10.5)
POTASSIUM SERPL-SCNC: 4.9 MMOL/L (ref 3.5–5.1)
RBC # BLD AUTO: 3.66 M/UL (ref 4–5.2)
SODIUM SERPL-SCNC: 131 MMOL/L (ref 136–145)
WBC # BLD AUTO: 8.8 K/UL (ref 4–11)

## 2024-01-06 PROCEDURE — 6370000000 HC RX 637 (ALT 250 FOR IP): Performed by: HOSPITALIST

## 2024-01-06 PROCEDURE — 2060000000 HC ICU INTERMEDIATE R&B

## 2024-01-06 PROCEDURE — 6360000002 HC RX W HCPCS: Performed by: INTERNAL MEDICINE

## 2024-01-06 PROCEDURE — 94761 N-INVAS EAR/PLS OXIMETRY MLT: CPT

## 2024-01-06 PROCEDURE — 6360000002 HC RX W HCPCS: Performed by: STUDENT IN AN ORGANIZED HEALTH CARE EDUCATION/TRAINING PROGRAM

## 2024-01-06 PROCEDURE — 2580000003 HC RX 258: Performed by: INTERNAL MEDICINE

## 2024-01-06 PROCEDURE — 85025 COMPLETE CBC W/AUTO DIFF WBC: CPT

## 2024-01-06 PROCEDURE — 6370000000 HC RX 637 (ALT 250 FOR IP): Performed by: STUDENT IN AN ORGANIZED HEALTH CARE EDUCATION/TRAINING PROGRAM

## 2024-01-06 PROCEDURE — 80048 BASIC METABOLIC PNL TOTAL CA: CPT

## 2024-01-06 PROCEDURE — 94669 MECHANICAL CHEST WALL OSCILL: CPT

## 2024-01-06 PROCEDURE — 6370000000 HC RX 637 (ALT 250 FOR IP): Performed by: INTERNAL MEDICINE

## 2024-01-06 PROCEDURE — 99232 SBSQ HOSP IP/OBS MODERATE 35: CPT | Performed by: INTERNAL MEDICINE

## 2024-01-06 PROCEDURE — 36415 COLL VENOUS BLD VENIPUNCTURE: CPT

## 2024-01-06 PROCEDURE — 2700000000 HC OXYGEN THERAPY PER DAY

## 2024-01-06 PROCEDURE — 94640 AIRWAY INHALATION TREATMENT: CPT

## 2024-01-06 RX ORDER — FUROSEMIDE 10 MG/ML
20 INJECTION INTRAMUSCULAR; INTRAVENOUS ONCE
Status: COMPLETED | OUTPATIENT
Start: 2024-01-06 | End: 2024-01-06

## 2024-01-06 RX ORDER — LINEZOLID 600 MG/1
600 TABLET, FILM COATED ORAL EVERY 12 HOURS SCHEDULED
Status: DISCONTINUED | OUTPATIENT
Start: 2024-01-06 | End: 2024-01-07 | Stop reason: HOSPADM

## 2024-01-06 RX ADMIN — Medication 2 PUFF: at 21:44

## 2024-01-06 RX ADMIN — INSULIN LISPRO 4 UNITS: 100 INJECTION, SOLUTION INTRAVENOUS; SUBCUTANEOUS at 22:08

## 2024-01-06 RX ADMIN — ENOXAPARIN SODIUM 40 MG: 100 INJECTION SUBCUTANEOUS at 08:59

## 2024-01-06 RX ADMIN — FUROSEMIDE 20 MG: 10 INJECTION, SOLUTION INTRAMUSCULAR; INTRAVENOUS at 16:29

## 2024-01-06 RX ADMIN — MEROPENEM 1000 MG: 1 INJECTION, POWDER, FOR SOLUTION INTRAVENOUS at 11:11

## 2024-01-06 RX ADMIN — LINEZOLID 600 MG: 600 TABLET, FILM COATED ORAL at 22:05

## 2024-01-06 RX ADMIN — ATORVASTATIN CALCIUM 40 MG: 40 TABLET, FILM COATED ORAL at 22:06

## 2024-01-06 RX ADMIN — MORPHINE SULFATE 15 MG: 15 TABLET, FILM COATED, EXTENDED RELEASE ORAL at 22:06

## 2024-01-06 RX ADMIN — INSULIN GLARGINE 25 UNITS: 100 INJECTION, SOLUTION SUBCUTANEOUS at 08:59

## 2024-01-06 RX ADMIN — GUAIFENESIN 600 MG: 600 TABLET ORAL at 08:58

## 2024-01-06 RX ADMIN — MEROPENEM 1000 MG: 1 INJECTION, POWDER, FOR SOLUTION INTRAVENOUS at 21:57

## 2024-01-06 RX ADMIN — NYSTATIN 500000 UNITS: 100000 SUSPENSION ORAL at 22:05

## 2024-01-06 RX ADMIN — Medication 2 PUFF: at 08:38

## 2024-01-06 RX ADMIN — MEROPENEM 1000 MG: 1 INJECTION, POWDER, FOR SOLUTION INTRAVENOUS at 05:11

## 2024-01-06 RX ADMIN — GUAIFENESIN 600 MG: 600 TABLET ORAL at 22:06

## 2024-01-06 RX ADMIN — GABAPENTIN 300 MG: 300 CAPSULE ORAL at 22:06

## 2024-01-06 RX ADMIN — DEXAMETHASONE SODIUM PHOSPHATE 10 MG: 10 INJECTION INTRAMUSCULAR; INTRAVENOUS at 13:06

## 2024-01-06 RX ADMIN — FLUDROCORTISONE ACETATE 0.1 MG: 0.1 TABLET ORAL at 11:10

## 2024-01-06 RX ADMIN — ASPIRIN 81 MG: 81 TABLET, CHEWABLE ORAL at 08:58

## 2024-01-06 RX ADMIN — TORSEMIDE 20 MG: 20 TABLET ORAL at 08:58

## 2024-01-06 RX ADMIN — DULOXETINE HYDROCHLORIDE 60 MG: 60 CAPSULE, DELAYED RELEASE ORAL at 22:06

## 2024-01-06 RX ADMIN — QUETIAPINE FUMARATE 25 MG: 25 TABLET ORAL at 22:06

## 2024-01-06 RX ADMIN — NYSTATIN 500000 UNITS: 100000 SUSPENSION ORAL at 08:59

## 2024-01-06 RX ADMIN — INSULIN LISPRO 8 UNITS: 100 INJECTION, SOLUTION INTRAVENOUS; SUBCUTANEOUS at 17:36

## 2024-01-06 RX ADMIN — MORPHINE SULFATE 15 MG: 15 TABLET, FILM COATED, EXTENDED RELEASE ORAL at 08:58

## 2024-01-06 RX ADMIN — NYSTATIN 500000 UNITS: 100000 SUSPENSION ORAL at 13:05

## 2024-01-06 RX ADMIN — PANTOPRAZOLE SODIUM 40 MG: 40 TABLET, DELAYED RELEASE ORAL at 05:11

## 2024-01-06 RX ADMIN — INSULIN LISPRO 8 UNITS: 100 INJECTION, SOLUTION INTRAVENOUS; SUBCUTANEOUS at 13:06

## 2024-01-06 RX ADMIN — CLOPIDOGREL BISULFATE 75 MG: 75 TABLET ORAL at 08:58

## 2024-01-06 RX ADMIN — NYSTATIN 500000 UNITS: 100000 SUSPENSION ORAL at 16:29

## 2024-01-06 RX ADMIN — GABAPENTIN 300 MG: 300 CAPSULE ORAL at 08:58

## 2024-01-06 RX ADMIN — TIOTROPIUM BROMIDE INHALATION SPRAY 2 PUFF: 3.12 SPRAY, METERED RESPIRATORY (INHALATION) at 08:38

## 2024-01-06 RX ADMIN — METOPROLOL SUCCINATE 25 MG: 25 TABLET, EXTENDED RELEASE ORAL at 08:58

## 2024-01-06 RX ADMIN — INSULIN LISPRO 2 UNITS: 100 INJECTION, SOLUTION INTRAVENOUS; SUBCUTANEOUS at 13:05

## 2024-01-06 RX ADMIN — INSULIN GLARGINE 25 UNITS: 100 INJECTION, SOLUTION SUBCUTANEOUS at 22:09

## 2024-01-06 RX ADMIN — DULOXETINE HYDROCHLORIDE 60 MG: 60 CAPSULE, DELAYED RELEASE ORAL at 08:58

## 2024-01-06 ASSESSMENT — PAIN SCALES - GENERAL: PAINLEVEL_OUTOF10: 0

## 2024-01-06 NOTE — PLAN OF CARE
Problem: Pain  Goal: Verbalizes/displays adequate comfort level or baseline comfort level  Outcome: Progressing     Problem: Discharge Planning  Goal: Discharge to home or other facility with appropriate resources  Outcome: Progressing  Flowsheets (Taken 1/5/2024 2023)  Discharge to home or other facility with appropriate resources: Identify barriers to discharge with patient and caregiver     Problem: Safety - Adult  Goal: Free from fall injury  Outcome: Progressing     Problem: Skin/Tissue Integrity  Goal: Absence of new skin breakdown  Description: 1.  Monitor for areas of redness and/or skin breakdown  2.  Assess vascular access sites hourly  3.  Every 4-6 hours minimum:  Change oxygen saturation probe site  4.  Every 4-6 hours:  If on nasal continuous positive airway pressure, respiratory therapy assess nares and determine need for appliance change or resting period.  Outcome: Progressing     Problem: Chronic Conditions and Co-morbidities  Goal: Patient's chronic conditions and co-morbidity symptoms are monitored and maintained or improved  Outcome: Progressing  Flowsheets (Taken 1/5/2024 2023)  Care Plan - Patient's Chronic Conditions and Co-Morbidity Symptoms are Monitored and Maintained or Improved: Monitor and assess patient's chronic conditions and comorbid symptoms for stability, deterioration, or improvement     Problem: Nutrition Deficit:  Goal: Optimize nutritional status  Outcome: Progressing  Flowsheets (Taken 1/5/2024 0950 by Carmella Armstrong, MS, RD, LD)  Nutrient intake appropriate for improving, restoring, or maintaining nutritional needs:   Assess nutritional status and recommend course of action   Monitor oral intake, labs, and treatment plans   Recommend appropriate diets, oral nutritional supplements, and vitamin/mineral supplements     Problem: ABCDS Injury Assessment  Goal: Absence of physical injury  Outcome: Progressing

## 2024-01-07 VITALS
TEMPERATURE: 98.5 F | HEART RATE: 86 BPM | SYSTOLIC BLOOD PRESSURE: 107 MMHG | BODY MASS INDEX: 29.2 KG/M2 | WEIGHT: 186.07 LBS | HEIGHT: 67 IN | RESPIRATION RATE: 18 BRPM | DIASTOLIC BLOOD PRESSURE: 67 MMHG | OXYGEN SATURATION: 89 %

## 2024-01-07 LAB
ANION GAP SERPL CALCULATED.3IONS-SCNC: 7 MMOL/L (ref 3–16)
BASOPHILS # BLD: 0 K/UL (ref 0–0.2)
BASOPHILS NFR BLD: 0.1 %
BUN SERPL-MCNC: 23 MG/DL (ref 7–20)
CALCIUM SERPL-MCNC: 8.7 MG/DL (ref 8.3–10.6)
CHLORIDE SERPL-SCNC: 97 MMOL/L (ref 99–110)
CO2 SERPL-SCNC: 30 MMOL/L (ref 21–32)
CREAT SERPL-MCNC: 0.8 MG/DL (ref 0.6–1.2)
DEPRECATED RDW RBC AUTO: 16 % (ref 12.4–15.4)
EOSINOPHIL # BLD: 0 K/UL (ref 0–0.6)
EOSINOPHIL NFR BLD: 0.3 %
GFR SERPLBLD CREATININE-BSD FMLA CKD-EPI: >60 ML/MIN/{1.73_M2}
GLUCOSE BLD-MCNC: 133 MG/DL (ref 70–99)
GLUCOSE BLD-MCNC: 161 MG/DL (ref 70–99)
GLUCOSE BLD-MCNC: 58 MG/DL (ref 70–99)
GLUCOSE SERPL-MCNC: 98 MG/DL (ref 70–99)
HCT VFR BLD AUTO: 31.1 % (ref 36–48)
HGB BLD-MCNC: 10.1 G/DL (ref 12–16)
LYMPHOCYTES # BLD: 1.2 K/UL (ref 1–5.1)
LYMPHOCYTES NFR BLD: 10.7 %
MCH RBC QN AUTO: 27.5 PG (ref 26–34)
MCHC RBC AUTO-ENTMCNC: 32.5 G/DL (ref 31–36)
MCV RBC AUTO: 84.9 FL (ref 80–100)
MONOCYTES # BLD: 0.5 K/UL (ref 0–1.3)
MONOCYTES NFR BLD: 4.5 %
NEUTROPHILS # BLD: 9.1 K/UL (ref 1.7–7.7)
NEUTROPHILS NFR BLD: 84.4 %
PERFORMED ON: ABNORMAL
PLATELET # BLD AUTO: 275 K/UL (ref 135–450)
PMV BLD AUTO: 7.9 FL (ref 5–10.5)
POTASSIUM SERPL-SCNC: 3.8 MMOL/L (ref 3.5–5.1)
RBC # BLD AUTO: 3.66 M/UL (ref 4–5.2)
SODIUM SERPL-SCNC: 134 MMOL/L (ref 136–145)
WBC # BLD AUTO: 10.8 K/UL (ref 4–11)

## 2024-01-07 PROCEDURE — 6360000002 HC RX W HCPCS: Performed by: INTERNAL MEDICINE

## 2024-01-07 PROCEDURE — 36415 COLL VENOUS BLD VENIPUNCTURE: CPT

## 2024-01-07 PROCEDURE — 2580000003 HC RX 258: Performed by: INTERNAL MEDICINE

## 2024-01-07 PROCEDURE — 80048 BASIC METABOLIC PNL TOTAL CA: CPT

## 2024-01-07 PROCEDURE — 85025 COMPLETE CBC W/AUTO DIFF WBC: CPT

## 2024-01-07 PROCEDURE — 6370000000 HC RX 637 (ALT 250 FOR IP): Performed by: STUDENT IN AN ORGANIZED HEALTH CARE EDUCATION/TRAINING PROGRAM

## 2024-01-07 PROCEDURE — 99232 SBSQ HOSP IP/OBS MODERATE 35: CPT | Performed by: INTERNAL MEDICINE

## 2024-01-07 PROCEDURE — 6370000000 HC RX 637 (ALT 250 FOR IP): Performed by: INTERNAL MEDICINE

## 2024-01-07 PROCEDURE — 6370000000 HC RX 637 (ALT 250 FOR IP): Performed by: HOSPITALIST

## 2024-01-07 PROCEDURE — 94761 N-INVAS EAR/PLS OXIMETRY MLT: CPT

## 2024-01-07 PROCEDURE — 2700000000 HC OXYGEN THERAPY PER DAY

## 2024-01-07 PROCEDURE — 6360000002 HC RX W HCPCS: Performed by: STUDENT IN AN ORGANIZED HEALTH CARE EDUCATION/TRAINING PROGRAM

## 2024-01-07 PROCEDURE — 94640 AIRWAY INHALATION TREATMENT: CPT

## 2024-01-07 PROCEDURE — 2580000003 HC RX 258: Performed by: STUDENT IN AN ORGANIZED HEALTH CARE EDUCATION/TRAINING PROGRAM

## 2024-01-07 RX ORDER — GUAIFENESIN 600 MG/1
600 TABLET, EXTENDED RELEASE ORAL 2 TIMES DAILY
Qty: 20 TABLET | Refills: 0 | Status: SHIPPED | OUTPATIENT
Start: 2024-01-07

## 2024-01-07 RX ORDER — INSULIN GLARGINE 100 [IU]/ML
20 INJECTION, SOLUTION SUBCUTANEOUS 2 TIMES DAILY
Status: DISCONTINUED | OUTPATIENT
Start: 2024-01-07 | End: 2024-01-07 | Stop reason: HOSPADM

## 2024-01-07 RX ORDER — TORSEMIDE 20 MG/1
40 TABLET ORAL DAILY
Qty: 30 TABLET | Refills: 1 | Status: SHIPPED | OUTPATIENT
Start: 2024-01-07

## 2024-01-07 RX ORDER — LINEZOLID 600 MG/1
600 TABLET, FILM COATED ORAL EVERY 12 HOURS SCHEDULED
Qty: 8 TABLET | Refills: 0 | Status: SHIPPED | OUTPATIENT
Start: 2024-01-07 | End: 2024-01-11

## 2024-01-07 RX ADMIN — ASPIRIN 81 MG: 81 TABLET, CHEWABLE ORAL at 08:32

## 2024-01-07 RX ADMIN — SODIUM CHLORIDE, PRESERVATIVE FREE 10 ML: 5 INJECTION INTRAVENOUS at 08:38

## 2024-01-07 RX ADMIN — NYSTATIN 500000 UNITS: 100000 SUSPENSION ORAL at 08:32

## 2024-01-07 RX ADMIN — PANTOPRAZOLE SODIUM 40 MG: 40 TABLET, DELAYED RELEASE ORAL at 08:32

## 2024-01-07 RX ADMIN — GUAIFENESIN 600 MG: 600 TABLET ORAL at 08:32

## 2024-01-07 RX ADMIN — Medication 2 PUFF: at 07:42

## 2024-01-07 RX ADMIN — FLUDROCORTISONE ACETATE 0.1 MG: 0.1 TABLET ORAL at 08:32

## 2024-01-07 RX ADMIN — GABAPENTIN 300 MG: 300 CAPSULE ORAL at 08:32

## 2024-01-07 RX ADMIN — DULOXETINE HYDROCHLORIDE 60 MG: 60 CAPSULE, DELAYED RELEASE ORAL at 08:32

## 2024-01-07 RX ADMIN — TIOTROPIUM BROMIDE INHALATION SPRAY 2 PUFF: 3.12 SPRAY, METERED RESPIRATORY (INHALATION) at 07:42

## 2024-01-07 RX ADMIN — METOPROLOL SUCCINATE 25 MG: 25 TABLET, EXTENDED RELEASE ORAL at 08:32

## 2024-01-07 RX ADMIN — MORPHINE SULFATE 15 MG: 15 TABLET, FILM COATED, EXTENDED RELEASE ORAL at 08:32

## 2024-01-07 RX ADMIN — CLOPIDOGREL BISULFATE 75 MG: 75 TABLET ORAL at 08:32

## 2024-01-07 RX ADMIN — MEROPENEM 1000 MG: 1 INJECTION, POWDER, FOR SOLUTION INTRAVENOUS at 04:41

## 2024-01-07 RX ADMIN — LINEZOLID 600 MG: 600 TABLET, FILM COATED ORAL at 08:32

## 2024-01-07 RX ADMIN — ENOXAPARIN SODIUM 40 MG: 100 INJECTION SUBCUTANEOUS at 08:32

## 2024-01-07 RX ADMIN — TORSEMIDE 20 MG: 20 TABLET ORAL at 08:32

## 2024-01-07 NOTE — DISCHARGE SUMMARY
Discharge Summary    Name:  Gris Taveras /Age/Sex: 1950  (73 y.o. female)   MRN & CSN:  [id number] & [id number] Admission Date/Time: 2023 12:00 PM   Attending:  Rachelle Rizzo MD Discharging Physician: Rachelle Rizzo MD     Discharge Diagnosis:  Acute hypoxic respiratory failure  Septic shock  COVID-19 infection  Staphylococcal pneumonia  ISI  Insulin-dependent type 2 DM  COPD  History of rheumatoid arthritis  Patient with cardiomyopathy        Discharge Exam  Physical Exam  Vitals:    24 0603 24 0743 24 0746 24 0825   BP:    107/67   Pulse:   82 86   Resp:    18   Temp:    98.5 °F (36.9 °C)   TempSrc:    Oral   SpO2:  92% 91% (!) 89%   Weight: 84.4 kg (186 lb 1.1 oz)      Height:        General appearance: in apparent distress, appears stated age and cooperative.  HEENT: Pupils equal, round, and reactive to light. Conjunctivae/corneas clear, + oral thrush  Neck: Supple, with full range of motion. No jugular venous distention. Trachea midline.  Respiratory: Bilateral rhonchi.  Cardiovascular: tachycardia with normal rhythm  S1/S2 without murmurs, rubs or gallops.  Abdomen: Soft, non-tender, non-distended with normal bowel sounds.  Musculoskeletal: No clubbing, cyanosis or edema bilaterally.  Full range of motion without deformity.  Skin: Skin color, texture, turgor normal.  No rashes or lesions.  Neurologic:  Neurovascularly intact without any focal sensory/motor deficits. Cranial nerves: II-XII intact, grossly non-focal.  Psychiatric: Alert and oriented, thought content appropriate, normal insight  Capillary Refill: Brisk,< 3 seconds   Peripheral Pulses: +2 palpable, equal bilaterally    Hospital Course:   Gris Taveras is a 73 y.o.  female  who presents with <principal problem not specified>    Acute hypoxic respiratory failure and septic shock 2/2 COVID 19 pneumonia with super imposed bacterial pneumonia: Received Decadron inpatient.  Sputum culture growing MRSA,

## 2024-01-07 NOTE — PROGRESS NOTES
Hospitalist Progress Note      PCP: Armando Burroughs MD    Date of Admission: 12/29/2023  Current Hospital Day: 2    Chief Admission Complaint: Fatigue    Presenting History:    Gris Taveras is a 73 y.o. female with PMH of CAD s/p CABG, cardiomyopathy, and recent V-fib cardiac arrest with ROSC, Bronchiectasis, Tracheomalacia, Rheumatoid arthritis,  COPD, DANIELLE who presents with fatigue, and confusion. Pt was also noted to have new cough. She also reports vomiting this morning. Pt was having some halluncations as well. Pt is compliant with her medications including the prednisone.     At ED, pt was febrile, SBP was dropping reaching 60's; on room air. Labs Na 130, K 4.2, Cr 1.4, glucose 206 -> 75, WBC 7.0, Hgb 10.5.  Covid-19 positive. Received 1.5L IVF NS, solu-medrol 125 mg. Blood pressure was still in the 80's even on manual check. Discussed with ED, will admit pt to ICU  for shock  workup and management    Assessment/Plan:    Current Principle Problem:    Severe sepsis (HCC)    Admitted with septic shock due to COVID-19 pneumonia started on empiric antibiotic on cefepime and vancomycin, steroids pulmonary critical care consulted and following.  Acute kidney injury continue with IV fluid daily BMP avoid nephrotoxins agent.  Diabetes mellitus type 2 continue with current continue A1c.  COPD with acute respiratory failure requiring oxygen continue with inhalers supplemental oxygen wean as tolerated.  Rheumatoid arthritis continue home medication.  Ischemic cardiomyopathy EF 40% holding home medication due to hypotension.    DVT Prophylaxis: Lovenox subcu    Recent Labs     12/29/23  1224 12/30/23  0530    266     Diet: ADULT DIET; Regular; 4 carb choices (60 gm/meal); Low Fat/Low Chol/High Fiber/MITCHELL  ADULT ORAL NUTRITION SUPPLEMENT; AM Snack, PM Snack; Wound Healing Oral Supplement  Code Status: Full Code      PT/OT Eval Status:     Dispo -     Subjective: Patient is lying in bed on nasal cannula 
      Hospitalist Progress Note      PCP: Armando Burroughs MD    Date of Admission: 12/29/2023  Current Hospital Day: 4    Chief Admission Complaint: Fatigue       Presenting History:    Gris Taveras is a 73 y.o. female with PMH of CAD s/p CABG, cardiomyopathy, and recent V-fib cardiac arrest with ROSC, Bronchiectasis, Tracheomalacia, Rheumatoid arthritis,  COPD, DANIELLE who presents with fatigue, and confusion. Pt was also noted to have new cough. She also reports vomiting this morning. Pt was having some halluncations as well. Pt is compliant with her medications including the prednisone.     At ED, pt was febrile, SBP was dropping reaching 60's; on room air. Labs Na 130, K 4.2, Cr 1.4, glucose 206 -> 75, WBC 7.0, Hgb 10.5.  Covid-19 positive. Received 1.5L IVF NS, solu-medrol 125 mg. Blood pressure was still in the 80's even on manual check. Discussed with ED, will admit pt to ICU  for shock  workup and management  Patient was transferred out of ICU 12/30/2023    Assessment/Plan:    Current Principle Problem:    Severe sepsis (HCC)    Admitted with septic shock due to COVID-19 pneumonia started on empiric antibiotic on cefepime and vancomycin, steroids pulmonary critical care consulted and following.  MRSA screen positive.  Acute kidney injury improved with IV fluid , check daily BMP avoid nephrotoxins agent.  Diabetes mellitus type 2 continue with current continue A1c=7.8 on 8/5/23 repeat hemoglobin A1c pending.  COPD with acute respiratory failure requiring oxygen continue with inhalers supplemental oxygen wean as tolerated.  Rheumatoid arthritis continue home medication.  Ischemic cardiomyopathy EF 40% holding home medication due to hypotension, restarted Toprol-XL holding Entresto due to low blood pressure on admission.    DVT Prophylaxis:  Lovenox subcu       Recent Labs     12/30/23  0530 12/31/23  0600 01/01/24  0554    233 204     Diet: ADULT DIET; Regular; 4 carb choices (60 gm/meal); Low Fat/Low 
      Hospitalist Progress Note      PCP: Armando Burroughs MD    Date of Admission: 12/29/2023  Current Hospital Day: 5    Chief Admission Complaint: Fatigue       Presenting History:    Gris Taveras is a 73 y.o. female with PMH of CAD s/p CABG, cardiomyopathy, and recent V-fib cardiac arrest with ROSC, Bronchiectasis, Tracheomalacia, Rheumatoid arthritis,  COPD, DANIELLE who presents with fatigue, and confusion. Pt was also noted to have new cough. She also reports vomiting this morning. Pt was having some halluncations as well. Pt is compliant with her medications including the prednisone.     At ED, pt was febrile, SBP was dropping reaching 60's; on room air. Labs Na 130, K 4.2, Cr 1.4, glucose 206 -> 75, WBC 7.0, Hgb 10.5.  Covid-19 positive. Received 1.5L IVF NS, solu-medrol 125 mg. Blood pressure was still in the 80's even on manual check. Discussed with ED, will admit pt to ICU  for shock  workup and management  Patient was transferred out of ICU 12/30/2023    Assessment/Plan:    Current Principle Problem: AHRF    Acute hypoxic respiratory failure and septic shock 2/2 COVID 19 pneumonia with super imposed bacterial pneumonia: Increase Decadron to 10 mg IV daily, sputum culture growing MRSA, on vancomycin due to worsening O2 requirements and CXR with worsening, meropenem added as per pulmonology, currently requiring BIPAP with FiO2 100, wean as tolerated  Acute kidney injury improved with IV fluid ,   Diabetes mellitus type 2 continue with current continue A1c=8.1  COPD with acute respiratory failure requiring oxygen continue with inhalers supplemental oxygen wean as tolerated.  Rheumatoid arthritis continue home medication.  Ischemic cardiomyopathy EF 40% holding home medication due to hypotension, restarted Toprol-XL holding Entresto due to low blood pressure on admission.    DVT Prophylaxis:  Lovenox subcu       Recent Labs     12/31/23  0600 01/01/24  0554 01/02/24  0945    204 196       Diet regular 
      Hospitalist Progress Note      PCP: Armando Burroughs MD    Date of Admission: 12/29/2023  Current Hospital Day: 6    Chief Admission Complaint: Fatigue       Presenting History:    Gris Taveras is a 73 y.o. female with PMH of CAD s/p CABG, cardiomyopathy, and recent V-fib cardiac arrest with ROSC, Bronchiectasis, Tracheomalacia, Rheumatoid arthritis,  COPD, DANIELLE who presents with fatigue, and confusion. Pt was also noted to have new cough. She also reports vomiting this morning. Pt was having some halluncations as well. Pt is compliant with her medications including the prednisone.     At ED, pt was febrile, SBP was dropping reaching 60's; on room air. Labs Na 130, K 4.2, Cr 1.4, glucose 206 -> 75, WBC 7.0, Hgb 10.5.  Covid-19 positive. Received 1.5L IVF NS, solu-medrol 125 mg. Blood pressure was still in the 80's even on manual check. Discussed with ED, will admit pt to ICU  for shock  workup and management  Patient was transferred out of ICU 12/30/2023    Assessment/Plan:    Current Principle Problem: AHRF    Acute hypoxic respiratory failure and septic shock 2/2 COVID 19 pneumonia with super imposed bacterial pneumonia: Continue Decadron 10 mg IV daily, sputum culture growing MRSA, on vancomycin due to worsening O2 requirements and CXR with worsening, meropenem added as per pulmonology, currently Vapotherm 50 L 80% FiO2.  Oxygen requirement down from yesterday.  Acute kidney injury improved with IV fluids   Diabetes mellitus type 2: Increase Lantus to 15 units twice daily.  Maintain on lispro 5 units plus medium dose sliding scale.  Will adjust to maintain blood glucose less than 180 with a challenging given high-dose steroids.  Maintain on hypoglycemia protocol.  COPD with acute respiratory failure requiring oxygen continue with inhalers supplemental oxygen wean as tolerated.  Steroids as above for COVID-19 pneumonia.  Rheumatoid arthritis continue home medication.  Ischemic cardiomyopathy EF 40% holding 
      Hospitalist Progress Note      PCP: Armando Burroughs MD    Date of Admission: 12/29/2023  Current Hospital Day: 7    Chief Admission Complaint: Fatigue       Presenting History:    Gris Taveras is a 73 y.o. female with PMH of CAD s/p CABG, cardiomyopathy, and recent V-fib cardiac arrest with ROSC, Bronchiectasis, Tracheomalacia, Rheumatoid arthritis,  COPD, DANIELLE who presents with fatigue, and confusion. Pt was also noted to have new cough. She also reports vomiting this morning. Pt was having some halluncations as well. Pt is compliant with her medications including the prednisone.     At ED, pt was febrile, SBP was dropping reaching 60's; on room air. Labs Na 130, K 4.2, Cr 1.4, glucose 206 -> 75, WBC 7.0, Hgb 10.5.  Covid-19 positive. Received 1.5L IVF NS, solu-medrol 125 mg. Blood pressure was still in the 80's even on manual check. Discussed with ED, will admit pt to ICU  for shock  workup and management  Patient was transferred out of ICU 12/30/2023    Assessment/Plan:    Current Principle Problem: AHRF    Acute hypoxic respiratory failure and septic shock 2/2 COVID 19 pneumonia with super imposed bacterial pneumonia: Continue Decadron 10 mg IV daily, sputum culture growing MRSA, on vancomycin due to worsening O2 requirements and CXR with worsening, meropenem added as per pulmonology, who is on Vapotherm but now transition to 6 L nasal cannula oxygen and doing well.  Continue to wean off oxygen.  Acute kidney injury improved with IV fluids   Diabetes mellitus type 2: Increase Lantus to 20 units twice daily.  Maintain on lispro 5 units plus medium dose sliding scale.  Will adjust to maintain blood glucose less than 180 with a challenging given high-dose steroids.  Maintain on hypoglycemia protocol.  COPD with acute respiratory failure requiring oxygen continue with inhalers supplemental oxygen wean as tolerated.  Steroids as above for COVID-19 pneumonia.  Rheumatoid arthritis continue home 
      Hospitalist Progress Note      PCP: Armando Burroughs MD    Date of Admission: 12/29/2023  Current Hospital Day: 8    Chief Admission Complaint: Fatigue       Presenting History:    Gris Taveras is a 73 y.o. female with PMH of CAD s/p CABG, cardiomyopathy, and recent V-fib cardiac arrest with ROSC, Bronchiectasis, Tracheomalacia, Rheumatoid arthritis,  COPD, DANIELLE who presents with fatigue, and confusion. Pt was also noted to have new cough. She also reports vomiting this morning. Pt was having some halluncations as well. Pt is compliant with her medications including the prednisone.     At ED, pt was febrile, SBP was dropping reaching 60's; on room air. Labs Na 130, K 4.2, Cr 1.4, glucose 206 -> 75, WBC 7.0, Hgb 10.5.  Covid-19 positive. Received 1.5L IVF NS, solu-medrol 125 mg. Blood pressure was still in the 80's even on manual check. Discussed with ED, will admit pt to ICU  for shock  workup and management  Patient was transferred out of ICU 12/30/2023    Assessment/Plan:    Current Principle Problem: AHRF    Acute hypoxic respiratory failure and septic shock 2/2 COVID 19 pneumonia with super imposed bacterial pneumonia: Continue Decadron 10 mg IV daily, sputum culture growing MRSA, on vancomycin due to worsening O2 requirements and CXR with worsening, meropenem added as per pulmonology, who is on nasal cannula.  Today at 5 L.  Oxygen requirement improving.  Acute kidney injury improved with IV fluids.  Resume home diuretics.  Diabetes mellitus type 2: Will increase Lantus to 25 twice daily.  Increase Premeal to 8 units 3 times daily.  Continue medium dose sliding scale.  COPD with acute respiratory failure requiring oxygen continue with inhalers supplemental oxygen wean as tolerated.  Steroids as above for COVID-19 pneumonia.  Rheumatoid arthritis continue home medication.  Ischemic cardiomyopathy EF 40% holding home medication due to hypotension, restarted Toprol-XL holding Entresto due to low blood 
      Hospitalist Progress Note      PCP: Armando Burroughs MD    Date of Admission: 12/29/2023  Current Hospital Day: 9    Chief Admission Complaint: Fatigue       Presenting History:    Gris Taveras is a 73 y.o. female with PMH of CAD s/p CABG, cardiomyopathy, and recent V-fib cardiac arrest with ROSC, Bronchiectasis, Tracheomalacia, Rheumatoid arthritis,  COPD, DANIELLE who presents with fatigue, and confusion. Pt was also noted to have new cough. She also reports vomiting this morning. Pt was having some halluncations as well. Pt is compliant with her medications including the prednisone.     At ED, pt was febrile, SBP was dropping reaching 60's; on room air. Labs Na 130, K 4.2, Cr 1.4, glucose 206 -> 75, WBC 7.0, Hgb 10.5.  Covid-19 positive. Received 1.5L IVF NS, solu-medrol 125 mg. Blood pressure was still in the 80's even on manual check. Discussed with ED, will admit pt to ICU  for shock  workup and management  Patient was transferred out of ICU 12/30/2023    Assessment/Plan:    Current Principle Problem: AHRF    Acute hypoxic respiratory failure and septic shock 2/2 COVID 19 pneumonia with super imposed bacterial pneumonia: Continue Decadron 10 mg IV daily, sputum culture growing MRSA, on vancomycin due to worsening O2 requirements and CXR with worsening, meropenem added as per pulmonology, who is on nasal cannula.  Today at 4 L.  Oxygen requirement improving.  Give 1 dose of IV Lasix today.  Acute kidney injury improved with IV fluids.  Resume home diuretics.  Diabetes mellitus type 2: continue  Lantus  25 twice daily.  Continue Premeal to 8 units 3 times daily.  Continue medium dose sliding scale.  COPD with acute respiratory failure requiring oxygen continue with inhalers supplemental oxygen wean as tolerated.  Steroids as above for COVID-19 pneumonia.  Rheumatoid arthritis continue home medication.  Ischemic cardiomyopathy EF 40% holding home medication due to hypotension, restarted Toprol-XL holding 
   01/02/24 0902   Oxygen Therapy/Pulse Ox   O2 Therapy Oxygen   O2 Device Non-rebreather mask   O2 Flow Rate (L/min) 15 L/min   Pulse (!) 146   SpO2 (!) 69 %       
   01/02/24 0909   Oxygen Therapy/Pulse Ox   O2 Therapy Oxygen   O2 Device High flow nasal cannula  (and nrb)   O2 Flow Rate (L/min) 15 L/min   Pulse (!) 138   SpO2 (!) 79 %       
   01/02/24 0926   Oxygen Therapy/Pulse Ox   O2 Therapy Oxygen   O2 Device High flow nasal cannula  (and       nrb)   O2 Flow Rate (L/min) 15 L/min   Pulse (!) 111   Respirations (!) 32   SpO2 (!) 83 %       
   01/02/24 0935   NIV Type   $NIV $Daily Charge   Equipment Type v60   Mode Bilevel   Mask Type Full face mask   Mask Size Medium   Bonnet size Medium   Assessment   Pulse (!) 108   Respirations 30   SpO2 90 %   Comfort Level Fair   Using Accessory Muscles Yes   Skin Protection for O2 Device Yes   Location Nose   Breath Sounds   Right Upper Lobe Diminished   Right Middle Lobe Diminished   Right Lower Lobe Diminished   Left Upper Lobe Diminished   Left Lower Lobe Diminished   Settings/Measurements   PIP Observed 18 cm H20   IPAP 16 cmH20   CPAP/EPAP 8 cmH2O   Vt (Measured) 788 mL   Rate Ordered 20   FiO2  100 %   I Time/ I Time % 1 s   Minute Volume (L/min) 23.4 Liters   Mask Leak (lpm) 15 lpm   Patient's Home Machine No       
   01/02/24 0937   Oxygen Therapy/Pulse Ox   O2 Therapy Oxygen   $Oxygen $Daily Charge   O2 Device PAP (positive airway pressure)   Pulse (!) 110   SpO2 95 %   $Pulse Oximeter $Spot check (multiple/continuous)   Blood Gas  Performed? Yes   $ABG $Arterial Puncture   Thierno's Test #1 Pos   Site #1 Right Radial   Site Prepped #1 Yes   Number of Attempts #1 1   Pressure Held #1 Yes   Complications #1 None   Post-procedure #1 Standard   Specimen Status #1 Point of care   How Tolerated? Tolerated well       
   01/02/24 1021   Encounter Summary   Encounter Overview/Reason  Crisis   Service Provided For: Patient   Referral/Consult From: Multi-disciplinary team   Last Encounter  01/02/24  (Responded to Rapid, no family present, pt busy with care, offered silient prayer outside room.)   Crisis   Type Rapid Response       
   01/02/24 1122   NIV Type   Equipment Type v60   Mode Bilevel   Mask Type Full face mask   Mask Size Medium   Bonnet size Medium   Assessment   Pulse 91   Respirations 22   SpO2 100 %   Skin Protection for O2 Device Yes   Location Nose   Settings/Measurements   PIP Observed 15 cm H20   IPAP 16 cmH20   CPAP/EPAP 8 cmH2O   Vt (Measured) 651 mL   Rate Ordered 20   FiO2  100 %   I Time/ I Time % 1 s   Minute Volume (L/min) 20 Liters   Mask Leak (lpm) 4 lpm   Patient's Home Machine No       
   01/02/24 1342   Oxygen Therapy/Pulse Ox   O2 Therapy Oxygen   O2 Device Heated high flow cannula   O2 Flow Rate (L/min) 60 L/min   FiO2  100 %   Pulse 99   Respirations 20   SpO2 100 %       
   01/02/24 1548   Oxygen Therapy/Pulse Ox   O2 Therapy Oxygen   O2 Device Heated high flow cannula   O2 Flow Rate (L/min) 60 L/min   FiO2  100 %   Pulse 86   Respirations 22   SpO2 100 %       
   01/02/24 2045   Treatment   Treatment Type MDI   $Treatment Type $Inhaled Therapy/Meds   Medications Mometasone/Formoterol   Pre-Tx Pulse 86   Pre-Tx Resps 20   Breath Sounds Pre-Tx DEDE Diminished   Breath Sounds Pre-Tx LLL Diminished   Breath Sounds Pre-Tx RUL Diminished   Breath Sounds Pre-Tx RML Diminished   Breath Sounds Pre-Tx RLL Diminished   Breath Sounds Post-Tx DEDE Diminished   Breath Sounds Post-Tx LLL Diminished   Breath Sounds Post-Tx RUL Diminished   Breath Sounds Post-Tx RML Diminished   Breath Sounds Post-Tx RLL Diminished   Post-Tx Pulse 86   Post-Tx Resps 20   Delivery Source MDI with spacer   Position Semi-Foote's   Treatment Tolerance Well   Duration 10   Is patient on O2? Y   Oxygen Therapy/Pulse Ox   O2 Device Heated high flow cannula   O2 Flow Rate (L/min) 50 L/min   FiO2  90 %   SpO2 100 %       
   01/03/24 1213   Oxygen Therapy/Pulse Ox   O2 Therapy Oxygen humidified   O2 Device Heated high flow cannula   O2 Flow Rate (L/min) 50 L/min   FiO2  (S)  70 %   SpO2 95 %   Humidification Source Heated wire   Humidification Temp 37   Humidification Temp Measured 37       
   01/03/24 2028   Oxygen Therapy/Pulse Ox   O2 Therapy Oxygen humidified   O2 Device Heated high flow cannula   O2 Flow Rate (L/min) 50 L/min   FiO2  (S)  60 %   Pulse 85   Respirations 20   SpO2 98 %       
   01/06/24 1521   Encounter Summary   Encounter Overview/Reason  Spiritual/Emotional Needs;Rituals, Rites and Sacraments   Service Provided For: Patient and family together   Referral/Consult From: Patient;Other    Support System Family members   Last Encounter    (1/6 Confucianism communion)   Complexity of Encounter Low   Begin Time 1505   End Time  1522   Total Time Calculated 17 min   Rituals, Rites and Sacraments   Type Confucianism Communion       
   12/31/23 1056   Encounter Summary   Encounter Overview/Reason  Spiritual/Emotional Needs   Last Encounter    (12/31: prayed w/pt, informed her of next  being avail on 1/2, desires to recieve communion. note in referral folder. optimistic and well supported by family.)   Begin Time 1045   End Time  1058   Total Time Calculated 13 min   Rituals, Rites and Sacraments   Type Blessings     Thank you for consulting Spiritual Health    If you would like a 's presence for emotional, spiritual, grief or comfort care,   please dial \"[phone]\" and ask for the  on-call to be paged.    For help with Advanced Care Planning, Power of  for Healthcare or Living Will forms, you may also call us directly:    [phone]) [place]  [phone]) [place]  [phone]) Outpatient    - Osito Devine    [facility]      
/75   Pulse (!) 115   Temp 99 °F (37.2 °C) (Oral)   Resp 20   Ht 1.702 m (5' 7\")   Wt 82.5 kg (181 lb 14.1 oz)   SpO2 95%   BMI 28.49 kg/m²  on room air.  Pt up in chair with BLE elevated while up in chair.  Pt A&O, with complaints of chronic back and buttock pain, \"10\", scheduled PO morphine given.  Pt denies shortness of breath.  Lungs diminished.  Pt denies any other needs at this time.  Bedside table, call light, fluids within reach.  Pt instructed to call out for any needs and assistance.  Will continue to monitor.  Debra Mullen RN  1/1/2024      
Comprehensive Nutrition Assessment    Type and Reason for Visit:  Initial, Positive Nutrition Screen (MST=2: weight loss, decreased appetite)    Nutrition Recommendations/Plan:   Continue carb control, heart healthy diet - monitor need for liberalization   Modify ONS to Ensure HP BID   Encourage PO intakes as tolerated   Monitor nutrition adequacy, pertinent labs, bowel habits, wt changes, and clinical progress     Malnutrition Assessment:  Malnutrition Status:  At risk for malnutrition (Comment) (12/31/23 1402)      Nutrition Assessment:    74 yo female admitted with sepsis. Hx of CHF, COPD, DM, and CAD s/p CABG. +COVID, in droplet plus precautions. Pt unavailable via phone this date. Currently ordered carb control, heart healthy diet with PO intakes of % since admission. BG elevated. Weights appear stable per EMR over past 3 months, trending up per EMR. Recommend adding Ensure HP BID, drinking on previous visit. Will continue to monitor.    Nutrition Related Findings:    BG  since admission. BM today. Wound Type: Pressure Injury       Current Nutrition Intake & Therapies:    Average Meal Intake: 26-50%, %  Average Supplements Intake: None Ordered  ADULT DIET; Regular; 4 carb choices (60 gm/meal); Low Fat/Low Chol/High Fiber/MITCHELL  ADULT ORAL NUTRITION SUPPLEMENT; AM Snack, PM Snack; Wound Healing Oral Supplement    Anthropometric Measures:  Height: 170.2 cm (5' 7\")  Ideal Body Weight (IBW): 135 lbs (61 kg)       Current Body Weight: 87.1 kg (192 lb), 142.2 % IBW. Weight Source: Bed Scale  Current BMI (kg/m2): 30.1  Usual Body Weight: 86.6 kg (191 lb) (standing scale 9/11/23)  % Weight Change (Calculated): 0.5                    BMI Categories: Obese Class 1 (BMI 30.0-34.9)    Estimated Daily Nutrient Needs:  Energy Requirements Based On: Kcal/kg (25-30)  Weight Used for Energy Requirements: Ideal  Energy (kcal/day): 2629-9757 kcal  Weight Used for Protein Requirements: Ideal (1.2-1.4 g/kg)  Protein 
Comprehensive Nutrition Assessment    Type and Reason for Visit:  Reassess    Nutrition Recommendations/Plan:   Modify diet to CC5 and encourage PO intake   Continue chocolate ensure HP BID   Monitor nutrition adequacy, pertinent labs, bowel habits, wt changes, and clinical progress     Malnutrition Assessment:  Malnutrition Status:  At risk for malnutrition (Comment) (01/05/24 1006)    Context:  Acute Illness     Findings of the 6 clinical characteristics of malnutrition:  Energy Intake:  Mild decrease in energy intake (Comment)  Fluid Accumulation:  Mild Extremities    Nutrition Assessment:    Follow up: In droplet plus precautions d/t COVID-19, spoke to pt on the phone. Pt continues on regular diet. PO intakes 0%, 26-50%, % of meals in EMR. Pt reports appetite increasing some, reports eating 26-50% of meals. Reports good acceptance of ONS, will continue BID- pt likes chocolate. Encouraged protein intake to promote wound healing. Declined diet education questions. Continue to encourage PO intake, will continue to monitor.    Nutrition Related Findings:    LLE + 1 pitting, RLE non-pitting edema. NO BM in a few days, per pt. + BM on 12/31. -5 L since admission. -445 x 24 hours. Declines nausea. Wound Type: Pressure Injury       Current Nutrition Intake & Therapies:    Average Meal Intake: 26-50%, %  Average Supplements Intake: %, 51-75%  ADULT DIET; Regular  ADULT ORAL NUTRITION SUPPLEMENT; Breakfast, AM Snack, Lunch; Standard High Calorie/High Protein Oral Supplement    Anthropometric Measures:  Height: 170.2 cm (5' 7\")  Ideal Body Weight (IBW): 135 lbs (61 kg)       Current Body Weight: 85.7 kg (189 lb), 142.2 % IBW. Weight Source: Standing Scale  Current BMI (kg/m2): 29.6  Usual Body Weight: 86.6 kg (191 lb) (standing scale 9/11/23)  % Weight Change (Calculated): 0.5  Weight Adjustment For: No Adjustment                 BMI Categories: Overweight (BMI 25.0-29.9)    Estimated Daily Nutrient 
Hector Callejas NP, was sent the following perfect serve: \"Pt getting ready to move out of ICU to C4. Wanting to know if she can have her home MS contin 15 mg added back or atleast a one time trail dose. They had been holding it for low bp. Also she takes Lantus 10 mg BID that isn't ordered and her BS 's are running in the 300's. Radha will be her nurse on C4 room 449. She is moving off now.\"    Matthew RN, given report and is now off the unit moving to room 449.  
Oxygen documentation:     O2 saturation at REST on ROOM AIR = _84_____%     If saturation is 89% or above please proceed with steps 2 and 3..........     O2 saturation with AMBULATION of _____ feet on ROOM AIR = _____%   O2 saturation with AMBULATION on _______ liter/min = ______%     DCP notified: ______       
PULMONARY AND CRITICAL CARE INPATIENT NOTE        Gris Taveras   : 1950  MRN: [id number]     Admitting Physician: Adolfo Lee MD  Attending Physician: Waldo Perez MD  PCP: Armando Burroughs MD    Admission: 2023   Date of Service: 2024    Chief Complaint   Patient presents with    Fatigue     Pt reporting she's exhausted. Says she has no energy. Daughter reports her sister said pt was \"off\" last night. Today she reports she noticed pt threw up a couple of times through the night because she saw it on her blankets. States she went to her house to pick her up for a doctors appt and pt forgot about it. Says they were driving to the ED and pt thought someone was in the back seat. She says that happens from time to time but today it's increased. Pt also has a wet cough and a fever upon arrival    Fever    Altered Mental Status    Cough           ASSESSMENT & PLAN       73 y.o. pleasant  female patient with:    Hospital Problems             Last Modified POA    Acute respiratory failure with hypoxemia ([facility]) 2024 Yes    Normocytic normochromic anemia 2024 Yes    Uncontrolled type 2 diabetes mellitus with hyperglycemia ([facility]) 2024 Yes    Pneumonia due to escherichia coli (E. coli) ([facility]) 2024 Yes    MRSA (methicillin resistant Staphylococcus aureus) 2024 Yes    COVID-19 2024 Yes    Lung consolidation ([facility]) 2024 Yes        NEURO/PSYCH:  # E. coli and pneumonia  # Depression  # Chronic pain  Delirium precautions.  Resume home medications      CARDIOVASCULAR:  # Status post septic shock  # In-hospital V. tach cardiac arrest in 2023.  2 minutes.  Left heart cath done by cardiology and LifeVest recommended  # Ischemic cardiomyopathy with systolic congestive dysfunction  # CAD s/p CABG in May 2021  # Mild elevated troponin  Dual antiplatelet therapy      PULMONARY:  # COVID-19 multifocal pneumonia  # Additional bacterial pneumonia.  Positive MRSA and E. coli on molecular 
PULMONARY AND CRITICAL CARE INPATIENT NOTE        Gris Taveras   : 1950  MRN: [id number]     Admitting Physician: Adolfo Lee MD  Attending Physician: Sandra Cason MD  PCP: Armando Burroughs MD    Admission: 2023   Date of Service: 2024    Chief Complaint   Patient presents with    Fatigue     Pt reporting she's exhausted. Says she has no energy. Daughter reports her sister said pt was \"off\" last night. Today she reports she noticed pt threw up a couple of times through the night because she saw it on her blankets. States she went to her house to pick her up for a doctors appt and pt forgot about it. Says they were driving to the ED and pt thought someone was in the back seat. She says that happens from time to time but today it's increased. Pt also has a wet cough and a fever upon arrival    Fever    Altered Mental Status    Cough           ASSESSMENT & PLAN       73 y.o. pleasant  female patient with:    Hospital Problems             Last Modified POA    * (Principal) Severe sepsis (HCC) 2023 Yes        NEURO/PSYCH:  # E. coli and pneumonia  # Depression  # Chronic pain  Delirium precautions.  Resume home medications      CARDIOVASCULAR:  # Status post septic shock  # In-hospital V. tach cardiac arrest in 2023.  2 minutes.  Left heart cath done by cardiology and LifeVest recommended  # Ischemic cardiomyopathy with systolic congestive dysfunction  # CAD s/p CABG in May 2021  # Mild elevated troponin  Dual antiplatelet therapy      PULMONARY:  # COVID-19 multifocal pneumonia  # Additional bacterial pneumonia.  Positive MRSA and E. coli on molecular panel  # Tracheobronchomalacia  # COPD mild to moderate on PFTs on Trelegy and Roflumilast  # Bronchiectasis with pulmonary MAC infection  # Respiratory failure with Serratia pneumonia and pleural effusions in 2023  # Tobacco abuse history  Uncontrolled diabetes mellitus with hyperglycemia  Oxygen supplementation, if 
PULMONARY AND CRITICAL CARE INPATIENT NOTE        Gris Taveras   : 1950  MRN: [id number]     Admitting Physician: Adolfo Lee MD  Attending Physician: Rachelle Rizzo MD  PCP: Armando Burroughs MD    Admission: 2023   Date of Service: 2024    Chief Complaint   Patient presents with    Fatigue     Pt reporting she's exhausted. Says she has no energy. Daughter reports her sister said pt was \"off\" last night. Today she reports she noticed pt threw up a couple of times through the night because she saw it on her blankets. States she went to her house to pick her up for a doctors appt and pt forgot about it. Says they were driving to the ED and pt thought someone was in the back seat. She says that happens from time to time but today it's increased. Pt also has a wet cough and a fever upon arrival    Fever    Altered Mental Status    Cough           ASSESSMENT & PLAN       73 y.o. pleasant  female patient with:    Hospital Problems             Last Modified POA    Acute respiratory failure with hypoxemia ([facility]) 2024 Yes    Normocytic normochromic anemia 2024 Yes    Uncontrolled type 2 diabetes mellitus with hyperglycemia ([facility]) 2024 Yes    Pneumonia due to escherichia coli (E. coli) ([facility]) 2024 Yes    MRSA (methicillin resistant Staphylococcus aureus) 2024 Yes    COVID-19 2024 Yes    Lung consolidation ([facility]) 2024 Yes        NEURO/PSYCH:  # E. coli and pneumonia  # Depression  # Chronic pain  Delirium precautions.  Resume home medications      CARDIOVASCULAR:  # Status post septic shock  # In-hospital V. tach cardiac arrest in 2023.  2 minutes.  Left heart cath done by cardiology and LifeVest recommended  # Ischemic cardiomyopathy with systolic congestive dysfunction  # CAD s/p CABG in May 2021  # Mild elevated troponin  Dual antiplatelet therapy      PULMONARY:  # COVID-19 multifocal pneumonia  # Additional bacterial pneumonia.  Positive MRSA and E. coli on 
PULMONARY AND CRITICAL CARE INPATIENT NOTE        Gris Taveras   : 1950  MRN: [id number]     Admitting Physician: Adolfo Lee MD  Attending Physician: Adolfo Lee MD  PCP: Armando Burroughs MD    Admission: 2023   Date of Service: 2023    Chief Complaint   Patient presents with    Fatigue     Pt reporting she's exhausted. Says she has no energy. Daughter reports her sister said pt was \"off\" last night. Today she reports she noticed pt threw up a couple of times through the night because she saw it on her blankets. States she went to her house to pick her up for a doctors appt and pt forgot about it. Says they were driving to the ED and pt thought someone was in the back seat. She says that happens from time to time but today it's increased. Pt also has a wet cough and a fever upon arrival    Fever    Altered Mental Status    Cough           ASSESSMENT & PLAN       73 y.o. pleasant  female patient with:    Hospital Problems             Last Modified POA    * (Principal) Severe sepsis (HCC) 2023 Yes        NEURO/PSYCH:  # Acute encephalopathy.  Toxic metabolic, critical illness delirium.  Improved  # Depression  # Chronic pain  Delirium precautions.  Resume home medications      CARDIOVASCULAR:  # Septic shock  # In-hospital V. tach cardiac arrest in 2023.  2 minutes.  Left heart cath done by cardiology and LifeVest recommended  # Ischemic cardiomyopathy with systolic congestive dysfunction  # CAD s/p CABG in May 2021  # Mild elevated troponin  Titrate vasopressors for map of 65.  Vasopressors weaned off this morning.  If blood pressure drops will give volume first.  Will resume home cardiac medications  LifeVest      PULMONARY:  # COVID-19 multifocal pneumonia  # Acute hypoxic respiratory failure  # Small bilateral effusions  # Tracheobronchomalacia  # COPD mild to moderate on PFTs on Trelegy and Roflumilast  # Bronchiectasis with pulmonary MAC infection  # Respiratory failure 
PULMONARY AND CRITICAL CARE INPATIENT NOTE        Gris Taveras   : 1950  MRN: [id number]     Admitting Physician: Adolfo Lee MD  Attending Physician: Rachelle Rizzo MD  PCP: Armando Burroughs MD    Admission: 2023   Date of Service: 2024    Chief Complaint   Patient presents with    Fatigue     Pt reporting she's exhausted. Says she has no energy. Daughter reports her sister said pt was \"off\" last night. Today she reports she noticed pt threw up a couple of times through the night because she saw it on her blankets. States she went to her house to pick her up for a doctors appt and pt forgot about it. Says they were driving to the ED and pt thought someone was in the back seat. She says that happens from time to time but today it's increased. Pt also has a wet cough and a fever upon arrival    Fever    Altered Mental Status    Cough           ASSESSMENT & PLAN       73 y.o. pleasant  female patient with:    Hospital Problems             Last Modified POA    Acute respiratory failure with hypoxemia ([facility]) 2024 Yes    Normocytic normochromic anemia 2024 Yes    Uncontrolled type 2 diabetes mellitus with hyperglycemia ([facility]) 2024 Yes    Pneumonia due to escherichia coli (E. coli) ([facility]) 2024 Yes    MRSA (methicillin resistant Staphylococcus aureus) 2024 Yes    COVID-19 2024 Yes    Lung consolidation ([facility]) 2024 Yes        NEURO/PSYCH:  # E. coli and pneumonia  # Depression  # Chronic pain  Delirium precautions.  Resume home medications      CARDIOVASCULAR:  # Status post septic shock  # In-hospital V. tach cardiac arrest in 2023.  2 minutes.  Left heart cath done by cardiology and LifeVest recommended  # Ischemic cardiomyopathy with systolic congestive dysfunction  # CAD s/p CABG in May 2021  # Mild elevated troponin  Dual antiplatelet therapy      PULMONARY:  # COVID-19 multifocal pneumonia  # Additional bacterial pneumonia.  Positive MRSA and E. coli on 
Patient admitted from ED to bed 221. Admission assessment completed and documented on flowsheets. Four eyes skin assessment completed with ABRAHAM Solano. Sacral mepilex applied, noted redness and a tunneled hole in the sacral area, old from a previous wound. Chlorhexidine bath given. VSS, will continue to monitor. Necklace and a ring given to daughter.     
Patient ready for discharge. IV removed, belongings packed. Prescriptions and discharge instructions reviewed. They verbalized understanding of all instructions. Patient transported out via wheelchair  
Perfect serve Dr. Callejas: patient has been anxious today.  Valium has helped during previous admissions.  Will you please consider prescribing a low dose of valium? Thanks.   
Perfect serve Dr. Cason: , low sliding scale. C/O anxiety, would you consider low dose valium? Thank you.   
Perfect serve Dr. Tomlinson: would you like to increase her sliding scale of humalog (shes running in the 300's).  Patient is still anxious, valium was helpful during her last admission.  The family is questioning when the metoprolol will be restarted (she is tachy and hypertensive compared to baseline).  thanks.   
Perfect serve Dr. Tomlinson: would you like to increase sliding scale for high sugars.  still c/o anxiety.  Valium has helped during last hosplital admission.  Family is asking when metroprolol will be restarted (she is running hypertensive and tachy when compared to baseline).  thanks :)  
Physical Therapy  Facility/Department: [facility] C4 PCU  Daily Treatment Note  NAME: Gris Taveras  : 1950  MRN: [id number]    Date of Service: 1/3/2024    Discharge Recommendations:  Subacute/Skilled Nursing Facility   PT Equipment Recommendations  Equipment Needed: No (defer to SNF)    Patient Diagnosis(es): The primary encounter diagnosis was COVID. A diagnosis of Septic shock (HCC) was also pertinent to this visit.    Assessment   Assessment: Pt requires more O2 than last session, required SBA/S for bed mobility and sat EOB several minutes before being able to move bed to chair using RW min A of 2.  Pt demos need for frequent and prolonged rests due to SHIN.  Pt O2 sats maintained 90-93% throughout.  Pt is recommended for con't skilled PT and SNF at D/C for con't strengtheing, gait and endurance training.  Activity Tolerance: Treatment limited secondary to medical complications (SHIN)  Equipment Needed: No (defer to SNF)     Plan    Physical Therapy Plan  General Plan: 3-5 times per week  Current Treatment Recommendations: Strengthening;ROM;Balance training;Functional mobility training;Endurance training;Transfer training;Wheelchair mobility training;Gait training;Home exercise program;Safety education & training;Patient/Caregiver education & training;Equipment evaluation, education, & procurement;Therapeutic activities     Restrictions  Restrictions/Precautions  Restrictions/Precautions: Fall Risk, Contact Precautions  Position Activity Restriction  Other position/activity restrictions: CVC R jugular vein, Purewick, telemetr, droplet + and contact precautions (COVID and MDRO/MRSA)     Subjective    Subjective  Subjective: Pt pleasant and agreeable, motivated to return home  Pain: pt reported chronic back pain  Orientation  Overall Orientation Status: Within Normal Limits  Cognition  Overall Cognitive Status: WFL     Objective   Vitals  Vitals:    24    BP: 133/70   Pulse: (!) 105   Resp:    Temp:  
Pt awake and in good spirits. A&Ox4. Levo off at 0900. O2 requirements increased, to 5 l/m. Pt desats occassionally when talking or falling asleep. Central line dressing changed. PT had medium BM in bedside commode. Concerns addressed. Pt hemodynamically stable, will continue to monitor.  
Pt called out stating she could not breath. Upon assessment pts O2 was 80% on RA, HR 140s. Pt placed on 10L and recovered to 92%, HR 120s. Pt states she feels better and was able to take all morning medications. O2 turned to 5L, O2 in the 90s at this time.    0900: Pt O2 dropped 75%, HR back up to 140s. Pt placed on 15L HF and NRB. MD notified and respiratory called for support. Rapid response called.  
Pt has had a good night.      Dry, hacking, barking cough.  Unable to expectorate sputum.     Pt allowed slow taper of levophed to ensure kidney perfusion after septic cyrstalloid boluses.      Rested well.  IV abx.   Urinated well.       Continuing to sleep this am.      JOEY Walker RN   BP (!) 119/49   Pulse 89   Temp 98.4 °F (36.9 °C) (Oral)   Resp 10   Ht 1.702 m (5' 7\")   Wt 87.2 kg (192 lb 3.9 oz)   SpO2 94%   BMI 30.11 kg/m²     
Pts BS 51, no symptoms at this time. Pt given 16g glucose tablets per MAR. BS checked 15 minutes later and it is 113.   
Rapid  response called. Patient on 15 lpm high flow and NRB, ABG drawn. Patient placed on bipap. Tolerating well at this time.   
Respiratory able to put patient on 6L NC and turn off heated high flow this morning. Patient currently 92% on 6L NC.       
    Subjective    Subjective  Subjective: pt motivated to participate, increased O2 demands, limited mobility  Pain: pt reported chronic back pain  Orientation  Overall Orientation Status: Within Normal Limits  Orientation Level: Oriented to place;Oriented to situation;Oriented to time;Oriented to person;Oriented X4  Cognition  Overall Cognitive Status: WFL     Objective   Vitals   Vitals:    01/05/24 1215   BP: 129/71   Pulse: 81   Resp: 18   Temp: 98.3 °F (36.8 °C)   SpO2: 100% on 6L          Bed Mobility Training  Bed Mobility Training: No (pt in chair at start and end of session)  Supine to Sit: Supervision;Additional time;Adaptive equipment  Scooting: Stand-by assistance;Additional time  Balance  Sitting: Intact  Standing: Impaired  Standing - Static: Fair;Constant support  Standing - Dynamic: Fair;Constant support  Transfer Training  Transfer Training: Yes  Overall Level of Assistance: Contact-guard assistance  Interventions: Verbal cues;Tactile cues;Safety awareness training  Sit to Stand: Minimum assistance;Assist X1;Additional time  Stand to Sit: Additional time;Assist X1;Contact-guard assistance  Bed to Chair: Minimum assistance;Additional time;Adaptive equipment (RW)  Gait Training  Gait Training: No (pt with drop in SpO2 with adjustments in chair, increased O2 to 8L after standing to recover to 90s, unable to progress gait)     PT Exercises  Exercise Treatment: seated B LE ankle DF/PF, knee ext, hip flex, glute sets x 5 reps - O2 drop with each activity requiring rest to recover  Static Standing Balance Exercises: with B UE support on RW with postural corrections, limited by decreased SpO2 to 80% on 6LO2, increased O2 to 8L to recover to mid 90s     Safety Devices  Type of Devices: All fall risk precautions in place;Call light within reach;Chair alarm in place;Gait belt;Patient at risk for falls;Left in chair;Nurse notified  Restraints  Restraints Initially in Place: No       Goals  Short Term 
Healing Oral Supplement  Code Status: Full Code      PT/OT Eval Status: Consulted    Dispo -     Subjective: Patient sitting in a chair complains of cough also unable to sleep last night requesting a sleep medicine, complains of a nosebleed has nasal cannula oxygen.      Medications:  Reviewed    Infusion Medications    dextrose      sodium chloride      norepinephrine Stopped (12/30/23 0937)     Scheduled Medications    insulin glargine  10 Units SubCUTAneous BID    sodium chloride  1,000 mL IntraVENous Once    cefepime  2,000 mg IntraVENous Q12H    aspirin  81 mg Oral Daily    atorvastatin  40 mg Oral Nightly    clopidogrel  75 mg Oral Daily    DULoxetine  60 mg Oral BID    gabapentin  300 mg Oral BID    insulin lispro  0-4 Units SubCUTAneous TID WC    insulin lispro  0-4 Units SubCUTAneous Nightly    [Held by provider] metoprolol succinate  25 mg Oral Daily    [Held by provider] morphine  15 mg Oral BID    pantoprazole  40 mg Oral QAM AC    [Held by provider] oxyCODONE-acetaminophen  1 tablet Oral Daily    [Held by provider] Roflumilast  500 mcg Oral Daily    [Held by provider] spironolactone  12.5 mg Oral Daily    [Held by provider] torsemide  20 mg Oral Daily    sodium chloride flush  5-40 mL IntraVENous 2 times per day    enoxaparin  40 mg SubCUTAneous Daily    vancomycin  500 mg IntraVENous Q12H    guaiFENesin  600 mg Oral BID    fludrocortisone  0.1 mg Oral Daily    dexAMETHasone  6 mg IntraVENous Q12H    tiotropium  2 puff Inhalation Daily RT    mometasone-formoterol  2 puff Inhalation BID RT     PRN Meds: oxyCODONE, glucose, dextrose bolus **OR** dextrose bolus, glucagon (rDNA), dextrose, sodium chloride flush, sodium chloride, ondansetron **OR** ondansetron, polyethylene glycol, acetaminophen **OR** acetaminophen, guaiFENesin-dextromethorphan      Intake/Output Summary (Last 24 hours) at 12/31/2023 0922  Last data filed at 12/31/2023 0556  Gross per 24 hour   Intake 518.2 ml   Output 1600 ml   Net -1081.8 
awareness of need for assistance  Problem Solving: Assistance required to identify errors made  Insights: Decreased awareness of deficits  Initiation: Does not require cues  Sequencing: Requires cues for some      Objective    Vitals  Vitals  Pulse: 87  Heart Rate Source: Monitor  BP: (!) 121/57  BP Location: Left upper arm  BP Method: Automatic  Patient Position: Semi fowlers  MAP (Calculated): 78  SpO2: 94 %  O2 Device: High flow nasal cannula  Comment: 7L high flow nasal cannula  Bed Mobility Training  Bed Mobility Training: No  Transfer Training  Transfer Training: Yes  Interventions: Verbal cues;Tactile cues;Safety awareness training  Sit to Stand: Minimum assistance;Assist X1;Additional time  Stand to Sit: Additional time;Assist X1;Contact-guard assistance     ADL  Grooming: Setup;Increased time to complete  Grooming Skilled Clinical Factors: completed oral hygiene, face washing, and hair brushing seated in bedside chair with increased time and setup.  Toileting: Maximum assistance  Toileting Skilled Clinical Factors: purewick    Goals all non-met goals ongoing as of 01/04/2024  Short Term Goals  Time Frame for Short Term Goals: 1 week by 1/8/24 - ALL GOALS ONGOING 1/3  Short Term Goal 1: pt will complete LBD with SPV  Short Term Goal 2: pt will complete toileting with SPV  Short Term Goal 3: pt will complete toilet transfer with SPV  Short Term Goal 4: pt will complete 1 grooming task in standing with no LOB with SPV  Patient Goals   Patient goals : did not verbalize    AM-PAC - ADL  AM-PAC Daily Activity - Inpatient   How much help is needed for putting on and taking off regular lower body clothing?: A Little  How much help is needed for bathing (which includes washing, rinsing, drying)?: A Lot  How much help is needed for toileting (which includes using toilet, bedpan, or urinal)?: A Little  How much help is needed for putting on and taking off regular upper body clothing?: A Little  How much help is needed 
echocardiogram (11/02/2021); Colonoscopy; bronchoscopy (N/A, 10/13/2023); and bronchoscopy (10/13/2023).           Assessment   Performance deficits / Impairments: Decreased functional mobility ;Decreased ADL status;Decreased strength;Decreased endurance;Decreased balance;Decreased high-level IADLs  Assessment: Gris Taveras is a 73 y.o. female with PMH of CAD s/p CABG, cardiomyopathy, and recent V-fib cardiac arrest with ROSC, Bronchiectasis, Tracheomalacia, Rheumatoid arthritis,  COPD, DANIELLE who presents with fatigue, and confusion. Pt was also noted to have new cough. Pt is currently under droplet plus and contact precautions for COVID and MDRO/MRSA.  Pt currently functioning below baseline, requiring CGA for transfers and short distance gait in room at . Pt requiring increased assistance for lower surface transfers such as toilet. pt able to complete toileting, LBD, and toilet transfer with CGA. Pt would benefit from continued acute skilled OT during LOS to address these limitations and allow for safe discharge. Discharge recommendations at this time: SNF vs Home with initial 24H and HHPT.  If patient does not have 24 hour assistance, recomemnding SNF secondary to assistance needed for ADLs, IADLs, and safety wtih mobility, decreased endurnace and dynamic balance. Pt lives alone and would benefit from further skilled therapy services upon discharge.  Prognosis: Good  Decision Making: Medium Complexity  REQUIRES OT FOLLOW-UP: Yes  Activity Tolerance  Activity Tolerance: Patient Tolerated treatment well        Plan   Occupational Therapy Plan  Times Per Week: 3-5x  Current Treatment Recommendations: Strengthening, Balance training, Functional mobility training, Endurance training, Safety education & training, Equipment evaluation, education, & procurement, Patient/Caregiver education & training, Modalities, Positioning, Self-Care / ADL, Home management training 
  Minutes       43   Timed Code Treatment Minutes: 43 Minutes       Maritza Smith, OT     
understanding;Verbalized understanding;Continued education needed    Goals all non-met goals ongoing as of 01/05/2024.  Short Term Goals  Time Frame for Short Term Goals: 1 week by 1/8/24 - ALL GOALS ONGOING 1/3  Short Term Goal 1: pt will complete LBD with SPV  Short Term Goal 2: pt will complete toileting with SPV  Short Term Goal 3: pt will complete toilet transfer with SPV  Short Term Goal 4: pt will complete 1 grooming task in standing with no LOB with SPV  Patient Goals   Patient goals : did not verbalize    AM-PAC - ADL  AM-PAC Daily Activity - Inpatient   How much help is needed for putting on and taking off regular lower body clothing?: A Lot  How much help is needed for bathing (which includes washing, rinsing, drying)?: A Lot  How much help is needed for toileting (which includes using toilet, bedpan, or urinal)?: A Little  How much help is needed for putting on and taking off regular upper body clothing?: A Little  How much help is needed for taking care of personal grooming?: A Little  How much help for eating meals?: None  AM-PAC Inpatient Daily Activity Raw Score: 17  AM-PAC Inpatient ADL T-Scale Score : 37.26  ADL Inpatient CMS 0-100% Score: 50.11  ADL Inpatient CMS G-Code Modifier : CK    Therapy Time   Individual Concurrent Group Co-treatment   Time In 1111         Time Out 1150         Minutes 39         Timed Code Treatment Minutes: 39 Minutes     Jenny Arce OT     
harvest site) 07/12/2021    Temporal arteritis (HCC) 07/10/2013    Tobacco use 10/19/2017    Tracheomalacia     Vitreous hemorrhage, right eye (HCC) 02/21/2020        Past Surgical History:   Procedure Laterality Date    ARTERY BIOPSY Right 03/01/2021    at [facility]    ARTERY SURGERY  05/30/2013    left temporal artery biopsy    BACK SURGERY  08/2020    BRONCHOSCOPY      BRONCHOSCOPY N/A 06/12/2019    BRONCHOSCOPY ALVEOLAR LAVAGE performed by Osito Kate MD at [facility] ENDOSCOPY    BRONCHOSCOPY N/A 10/13/2023    BRONCHOSCOPY THERAPUTIC ASPIRATION INITIAL performed by Osito Kate MD at [facility] ENDOSCOPY    BRONCHOSCOPY  10/13/2023    BRONCHOSCOPY ALVEOLAR LAVAGE performed by Osito Kate MD at [facility] ENDOSCOPY    CARDIAC SURGERY  05/03/2021    CATARACT REMOVAL      COLONOSCOPY      CORONARY ARTERY BYPASS GRAFT N/A 05/03/2021    CORONARY ARTERY BYPASS X3 WITH LEFT ATRIAL APPENDAGE CLIP, 5 LEVEL BILATERAL INTERCOSTAL NERVE BLOCK, STERNAL PLATING performed by Leroy Lee MD at [facility]    ELBOW SURGERY      FOOT SURGERY      HERNIA REPAIR      IR MIDLINE CATH  7/14/2021    IR MIDLINE CATH 7/14/2021 [facility] SPECIAL PROCEDURES    KNEE ARTHROSCOPY      left    KYPHOSIS SURGERY      LAMINECTOMY      LEG SURGERY Left 7/13/2021    INCISION AND DEBRIDEMENT LEFT LOWER LEG WOUND WITH POSSIBLE WOUND VAC PLACEMENT performed by Clint Rocha MD at [facility]    MANDIBLE FRACTURE SURGERY      MANDIBLE FRACTURE SURGERY  02/2020    MEDIASTINOSCOPY N/A 05/05/2021    MEDIASTINAL EXPLORATION AND EVACUATION OF HEMATOMA performed by Leroy Lee MD at [facility]    OTHER SURGICAL HISTORY  01/08/2021    sacral wound debridement    PRESSURE ULCER DEBRIDEMENT N/A 01/08/2021    SACRAL WOUND DEBRIDEMENT performed by Harrison Marquez MD at [facility] OR    SEPTOPLASTY  05/07/2013    FESS with balloon    SPINAL FUSION      TRANSESOPHAGEAL ECHOCARDIOGRAM  11/02/2021    TUBAL LIGATION      UPPER GASTROINTESTINAL 
pain))                          OutComes Score      AM-PAC - Mobility    AM-PAC Basic Mobility - Inpatient   How much help is needed turning from your back to your side while in a flat bed without using bedrails?: A Little  How much help is needed moving from lying on your back to sitting on the side of a flat bed without using bedrails?: A Little  How much help is needed moving to and from a bed to a chair?: A Little  How much help is needed standing up from a chair using your arms?: A Little  How much help is needed walking in hospital room?: A Little  How much help is needed climbing 3-5 steps with a railing?: A Lot  AM-PAC Inpatient Mobility Raw Score : 17  AM-PAC Inpatient T-Scale Score : 42.13  Mobility Inpatient CMS 0-100% Score: 50.57  Mobility Inpatient CMS G-Code Modifier : CK             Goals  Short Term Goals  Time Frame for Short Term Goals: 1 week (1/8/23)  Short Term Goal 1: Pt to perform bed mobility in hospital bed with mod I.  Short Term Goal 2: Pt to perform sit to/from stand at RW/rollator with supervision.  Short Term Goal 3: Pt to perform bed <> chair at RW/rollator with superivision.  Short Term Goal 4: Pt to ambulate 20 ft with RW/rollator with supervision.  Short Term Goal 5: To be met by 1/4/24: Pt to perofrm 12-15 reps of LE HEP to target strength/ROM.  Patient Goals   Patient Goals : to return home       Education  Patient Education  Education Given To: Patient  Education Provided: Role of Therapy;Plan of Care;Equipment;Transfer Training  Education Method: Verbal  Barriers to Learning: None  Education Outcome: Verbalized understanding      Therapy Time   Individual Concurrent Group Co-treatment   Time In 0822         Time Out 0900         Minutes 38         Timed Code Treatment Minutes: 28 Minutes (10 min eval)       Peggy Gilmore, PT         
               ORDERED BY: TUSHAR AGUIRRE  SOURCE: Blood Hand, Right                  COLLECTED:  12/29/23 12:29  ANTIBIOTICS AT JORGE.:                      RECEIVED :  12/29/23 22:55  If child <=2 yrs old please draw pediatric bottle.~Blood Culture 1    COVID-19 & Influenza Combo [[id number]]  (Abnormal) Collected: 12/29/23 1224    Order Status: Completed Specimen: Nose from Nasopharyngeal Swab Updated: 12/29/23 1319     SARS-CoV-2 RNA, RT PCR DETECTED     Comment: Detected results are indicative of the presence of SARS-CoV-2,  clinical correlation with patient history and other diagnostic  information is necessary to determine patient infection status.  A Detected results do not rule out bacterial infection or  co-infection with other pathogens.    Testing was performed using ALISA EVON SARS-CoV-2 and Influenza A/B  nucleic acid assay. This test is a multiplex Real-Time Reverse  Transcriptase Polymerase Chain Reaction (RT-PCR)-based in vitro  diagnostic test intended for the qualitative detection of nucleic  acids from SARS-CoV-2, influenza A, and influenza B in nasopharyngeal  and nasal swab specimens for use under the FDA’s Emergency Use  Authorization (EUA) only.    Patient Fact Sheet:  https://www.fda.gov/media/860170/download  Provider Fact Sheet: https://www.fda.gov/media/967034/download  EUA: https://www.fda.gov/media/378172/download  IFU: https://www.fda.gov/media/356389/download    Methodology:  RT-PCR          INFLUENZA A NOT DETECTED     Comment: Note:  Influenza A and Influenza B negative results should be considered  presumptive in samples that have a Detected SARS-CoV-2 result.  Consider  re-testing with an alternate FDA-approved test for Flu A & B if clinically  indicated.          INFLUENZA B NOT DETECTED     Comment: Note:  Influenza A and Influenza B negative results should be considered  presumptive in samples that have a Detected SARS-CoV-2 result.  Consider  re-testing with an alternate 
TUSHAR  SOURCE: Blood left forearm                 COLLECTED:  12/29/23 17:11  ANTIBIOTICS AT JORGE.:                      RECEIVED :  12/29/23 22:55  If child <=2 yrs old please draw pediatric bottle.~Blood Culture #2    Culture, Blood 1 [[id number]] Collected: 12/29/23 1229    Order Status: Completed Specimen: Blood Updated: 01/02/24 2015     Blood Culture, Routine No Growth after 4 days of incubation.    Narrative:      ORDER#: [id number]                          ORDERED BY: TUSHAR AGUIRRE  SOURCE: Blood Hand, Right                  COLLECTED:  12/29/23 12:29  ANTIBIOTICS AT JORGE.:                      RECEIVED :  12/29/23 22:55  If child <=2 yrs old please draw pediatric bottle.~Blood Culture 1    COVID-19 & Influenza Combo [[id number]]  (Abnormal) Collected: 12/29/23 1224    Order Status: Completed Specimen: Nose from Nasopharyngeal Swab Updated: 12/29/23 1319     SARS-CoV-2 RNA, RT PCR DETECTED     Comment: Detected results are indicative of the presence of SARS-CoV-2,  clinical correlation with patient history and other diagnostic  information is necessary to determine patient infection status.  A Detected results do not rule out bacterial infection or  co-infection with other pathogens.    Testing was performed using ALISA EVON SARS-CoV-2 and Influenza A/B  nucleic acid assay. This test is a multiplex Real-Time Reverse  Transcriptase Polymerase Chain Reaction (RT-PCR)-based in vitro  diagnostic test intended for the qualitative detection of nucleic  acids from SARS-CoV-2, influenza A, and influenza B in nasopharyngeal  and nasal swab specimens for use under the FDA’s Emergency Use  Authorization (EUA) only.    Patient Fact Sheet:  https://www.fda.gov/media/374469/download  Provider Fact Sheet: https://www.fda.gov/media/698614/download  EUA: https://www.fda.gov/media/977547/download  IFU: https://www.fda.gov/media/677567/download    Methodology:  RT-PCR          INFLUENZA A NOT DETECTED     Comment: Note:  
said the cardiomediastinal silhouette  appears stable.  Interval development of airspace infiltrates in the right  upper lobe and bilateral lower lobes.  No pneumothorax.  Trace right pleural  effusion.     IMPRESSION:  Interval development of airspace infiltrates in the right upper lobe and  bilateral lower lobes compatible with pneumonia.  Physical Exam:  General appearance: In no apparent distress on BiPAP.  HEENT: Moist mucus membranes.  Cardiac: Normal S1 and S2.  Lungs: Lateral crackles with decreased breath sound intensity  Abdomen: Soft.  Back & Extremities: Symmetric pulses with good perfusion.  Neurological: No focal deficit.      ________________________________________________________  Electronically signed by:  DERIK LEVINE MD,[facility]    1/3/2024    8:29 AM.

## 2024-01-07 NOTE — CARE COORDINATION
Pt is on [employer] o2 2 L NC at home. Appears pt will dc on 4 L NC. Requires new walk test and DME order for 4 L NC. Placed sticky note on front of chart and spoke to RN. Will fax clinicals to [facility] once complete.

## 2024-01-07 NOTE — DISCHARGE INSTR - COC
Continuity of Care Form    Patient Name: Gris Taveras   :  1950  MRN:  [id number]    Admit date:  2023  Discharge date:  ***    Code Status Order: Full Code   Advance Directives:     Admitting Physician:  Adolfo Lee MD  PCP: Armando Burroughs MD    Discharging Nurse: ***  Discharging Hospital Unit/Room#: 0449/0449-01  Discharging Unit Phone Number: ***    Emergency Contact:   Extended Emergency Contact Information  Primary Emergency Contact: Teena Guerra   [facility]  Home Phone: [phone]  Work Phone: [phone]  Mobile Phone: [phone]  Relation: Child   needed? No  Secondary Emergency Contact: Taryn Page   [facility]  Home Phone: [phone]  Work Phone: [phone]  Mobile Phone: [phone]  Relation: Child   needed? No    Past Surgical History:  Past Surgical History:   Procedure Laterality Date    ARTERY BIOPSY Right 2021    at [facility]    ARTERY SURGERY  2013    left temporal artery biopsy    BACK SURGERY  2020    BRONCHOSCOPY      BRONCHOSCOPY N/A 2019    BRONCHOSCOPY ALVEOLAR LAVAGE performed by Osito Kate MD at [facility] ENDOSCOPY    BRONCHOSCOPY N/A 10/13/2023    BRONCHOSCOPY THERAPUTIC ASPIRATION INITIAL performed by Osito Kate MD at [facility] ENDOSCOPY    BRONCHOSCOPY  10/13/2023    BRONCHOSCOPY ALVEOLAR LAVAGE performed by Osito Kate MD at [facility] ENDOSCOPY    CARDIAC SURGERY  2021    CATARACT REMOVAL      COLONOSCOPY      CORONARY ARTERY BYPASS GRAFT N/A 2021    CORONARY ARTERY BYPASS X3 WITH LEFT ATRIAL APPENDAGE CLIP, 5 LEVEL BILATERAL INTERCOSTAL NERVE BLOCK, STERNAL PLATING performed by Leroy Lee MD at [facility] CVOR    ELBOW SURGERY      FOOT SURGERY      HERNIA REPAIR      IR MIDLINE CATH  2021    IR MIDLINE CATH 2021 [facility] SPECIAL PROCEDURES    KNEE ARTHROSCOPY      left    KYPHOSIS SURGERY      LAMINECTOMY      LEG SURGERY Left 2021

## 2024-01-07 NOTE — CARE COORDINATION
CASE MANAGEMENT DISCHARGE SUMMARY      Discharge to: Home with [facility]    Transportation:    Family/car: yes      Confirmed discharge plan with:     Patient: yes     Family:  yes dtr CharlieTeena - Child - [phone]            RN, name: Laurita ROSARIO  FAxed clinicals to [facility] for new liter flow. Provided tank to [place] home with.  Note: Discharging nurse to complete AUTUMN, reconcile AVS, and place final copy with patient's discharge packet. RN to ensure that written prescriptions for  Level II medications are sent with patient to the facility as per protocol.

## 2024-01-07 NOTE — PLAN OF CARE
Problem: Pain  Goal: Verbalizes/displays adequate comfort level or baseline comfort level  Outcome: Adequate for Discharge     Problem: Discharge Planning  Goal: Discharge to home or other facility with appropriate resources  Outcome: Adequate for Discharge     Problem: Safety - Adult  Goal: Free from fall injury  Outcome: Adequate for Discharge     Problem: Skin/Tissue Integrity  Goal: Absence of new skin breakdown  Description: 1.  Monitor for areas of redness and/or skin breakdown  2.  Assess vascular access sites hourly  3.  Every 4-6 hours minimum:  Change oxygen saturation probe site  4.  Every 4-6 hours:  If on nasal continuous positive airway pressure, respiratory therapy assess nares and determine need for appliance change or resting period.  Outcome: Adequate for Discharge     Problem: Chronic Conditions and Co-morbidities  Goal: Patient's chronic conditions and co-morbidity symptoms are monitored and maintained or improved  Outcome: Adequate for Discharge     Problem: Nutrition Deficit:  Goal: Optimize nutritional status  Outcome: Adequate for Discharge     Problem: ABCDS Injury Assessment  Goal: Absence of physical injury  Outcome: Adequate for Discharge

## 2024-01-18 NOTE — TELEPHONE ENCOUNTER
Pt called with her update. She is feeling better. She would like to know how to proceed.  Please review readings:    5/10: 98/53 hr 78  5/11: 89/52  5/12:121/59 hr 86  5/13: 126/68 hr 82 (M6) obeys commands

## 2024-03-19 NOTE — DISCHARGE INSTRUCTIONS
[address] [facility] Physician Orders and Discharge [address], [address]  [place], [facility]  Telephone: [phone]      Fax: [phone]        Your home care company:   [facility]         Your wound-care supplies will be provided by: [facility] orders supplies through [employer]. Please note, depending on your insurance coverage, you may have out-of-pocket expenses for these supplies. Someone from [place] may call you to confirm your order and discuss those potential costs before they ship your products -- please anticipate that call. If your out-of-pocket cost is going to be less than $200, and [facility] cannot reach you, they may ship your supplies as soon as the order is processed, and will send you a bill. If your out-of-pocket cost is going to be more than $200, [facility] should not ship your supplies until they speak with you. If you have any questions about your supplies or your potential out-of-pocket costs, or if you need to place an order for a refill of supplies (typically monthly), Lizett Maki is your local representative, and can be reached at [phone]. Information on [employer]'s return policy will also be enclosed with your supplies -- please read that carefully before opening your items. NAME:  Brielle Taveras   YOB: 1950  PRIMARY DIAGNOSIS FOR WOUND CARE CENTER:  Pressure ulcer     Wound cleansing:  Do not scrub or use excessive force. Wash hands with soap and water before and after dressing changes. Prior to applying a clean dressing, cleanse wound with normal saline, wound cleanser, or mild soap and water. Ask your physician or nurse before getting the wound(s) wet in the shower.                 Wound care for home: NO HOME CARE NEEDED THIS WEEK FOR WOUND CARE    Sacral Wound:  Betadine to maegan wound   Calmoseptine maegan-wound  Collagen with silver to wound bed & tuck into depth of wound and into undermining   RAMON - please Has an appt with Dr Lam on 04/23/24.

## 2024-03-24 DIAGNOSIS — I25.10 CORONARY ARTERY DISEASE INVOLVING NATIVE CORONARY ARTERY OF NATIVE HEART WITHOUT ANGINA PECTORIS: ICD-10-CM

## 2024-03-24 RX ORDER — CLOPIDOGREL BISULFATE 75 MG/1
75 TABLET ORAL DAILY
Qty: 90 TABLET | Refills: 3 | OUTPATIENT
Start: 2024-03-24

## 2024-05-22 NOTE — PROGRESS NOTES
Received patient from procedure room. Report received from Lucio Coughlin. Patient is drowsy but talks appropriately. Satisfactory

## (undated) DEVICE — SUTURE VCRL SZ 3-0 L27IN ABSRB UD L26MM SH 1/2 CIR J416H

## (undated) DEVICE — SYSTEM BLD DEL

## (undated) DEVICE — MAJOR SET UP PK

## (undated) DEVICE — BLADE ES ELASTOMERIC COAT INSUL DURABLE BEND UPTO 90DEG

## (undated) DEVICE — GLOVE ORANGE PI 7 1/2   MSG9075

## (undated) DEVICE — [HIGH FLOW INSUFFLATOR,  DO NOT USE IF PACKAGE IS DAMAGED,  KEEP DRY,  KEEP AWAY FROM SUNLIGHT,  PROTECT FROM HEAT AND RADIOACTIVE SOURCES.]: Brand: PNEUMOSURE

## (undated) DEVICE — TIP APPL 20 GAX5 CM 2 CANN MALL

## (undated) DEVICE — SUTURE ETHBND EXCEL SZ 0 L18IN NONABSORBABLE GRN L36MM CT-1 CX21D

## (undated) DEVICE — SUTURE MCRYL + SZ 4-0 L18IN ABSRB UD L19MM PS-2 3/8 CIR MCP496G

## (undated) DEVICE — SINGLE USE SUCTION VALVE MAJ-209: Brand: SINGLE USE SUCTION VALVE (STERILE)

## (undated) DEVICE — COVER SPONGES: Brand: CURITY

## (undated) DEVICE — SYRINGE MED 10ML SLIP TIP BLNT FILL AND LUERLOCK DISP

## (undated) DEVICE — BANDAGE COBAN 4 IN COMPR W4INXL5YD FOAM COHESIVE QUIK STK SELF ADH SFT

## (undated) DEVICE — APPLICATOR PREP 26ML 0.7% IOD POVACRYLEX 74% ISO ALC ST

## (undated) DEVICE — TIP SUCT DIA12FR W STYL CTRL VENT DISPOSABLE FRAZ

## (undated) DEVICE — TIP APPL TOP 2 SPRY

## (undated) DEVICE — STOCKINETTE,IMPERVIOUS,12X48,STERILE: Brand: MEDLINE

## (undated) DEVICE — BLADE RETRCT 3 SH FNGR ASST

## (undated) DEVICE — SYRINGE MED 10ML LUERLOCK TIP W/O SFTY DISP

## (undated) DEVICE — COVER TRNSDUC W6XL96IN POLY TELESCOPICALLY FLD W/ ATTCH FRM

## (undated) DEVICE — ELECTRODE PT RET AD L9FT HI MOIST COND ADH HYDRGEL CORDED

## (undated) DEVICE — SUTURE ABSORBABLE BRAIDED 2-0 CT-1 27 IN UD VICRYL J259H

## (undated) DEVICE — CUSTOM PROCEDURE KIT: Brand: CUSTOM PROCEDURE KIT

## (undated) DEVICE — GOWN SIRUS NONREIN XL W/TWL: Brand: MEDLINE INDUSTRIES, INC.

## (undated) DEVICE — FOGARTY - HYDRAGRIP SURGICAL - CLAMP INSERTS: Brand: FOGARTY SOFTJAW

## (undated) DEVICE — RESERVOIR,SUCTION,100CC,SILICONE: Brand: MEDLINE

## (undated) DEVICE — NEBULIZER AEROSOL TBNG 7 FT FOR ACUTE CARE NEBUTECH

## (undated) DEVICE — SUTURE S STL SZ 6 L18IN NONABSORBABLE SIL L48MM CCS 1/2 CIR M654G

## (undated) DEVICE — GOWN AURORA POLY REINF XL: Brand: MEDLINE

## (undated) DEVICE — SUTURE VCRL + SZ 3-0 L27IN ABSRB UD L26MM SH 1/2 CIR VCP416H

## (undated) DEVICE — BATTERY DRVR W/ SELF DRL TAP FOR THINFLAP PWR DRVR

## (undated) DEVICE — APPLIER CLP L9.375IN APER 2.1MM CLS L3.8MM 20 SM TI CLP

## (undated) DEVICE — STERILE POLYISOPRENE POWDER-FREE SURGICAL GLOVES: Brand: PROTEXIS

## (undated) DEVICE — BANDAGE,GAUZE,4.5"X4.1YD,STERILE,LF: Brand: MEDLINE

## (undated) DEVICE — SHEET,DRAPE,40X58,STERILE: Brand: MEDLINE

## (undated) DEVICE — SUTURE PROL SZ 3-0 L36IN NONABSORBABLE BLU L26MM SH 1/2 CIR 8522H

## (undated) DEVICE — THORACIC CATHETER, RIGHT ANGLE, SILICONE, WITH CLOTSTOP®: Brand: AXIOM® ATRAUM™ WITH CLOTSTOP®

## (undated) DEVICE — SUTURE NONABSORBABLE MONOFILAMENT 5-0 C-1 1X24 IN PROLENE 8725H

## (undated) DEVICE — WAX SURG 2.5GM HEMSTAT BNE BEESWAX PARAFFIN ISO PALMITATE

## (undated) DEVICE — Device

## (undated) DEVICE — CANNULA ART 21FR L14IN VENT 3/8IN CONN SFT FLO ANG TIP W/

## (undated) DEVICE — SUTURE VCRL SZ 4-0 L18IN ABSRB UD L19MM PS-2 3/8 CIR PRIM J496H

## (undated) DEVICE — CANNULA NSL 13FT TUBE AD ETCO2 DIV SAMP M

## (undated) DEVICE — BLADE STRNM SAW STR 10/BX

## (undated) DEVICE — KIT APPL 11:1 PROC W/ FIBRIJET MED CUP APPL TIP TY

## (undated) DEVICE — SOLUTION IV IRRIG 500ML 0.9% SODIUM CHL 2F7123

## (undated) DEVICE — SUTURE VCRL SZ 3-0 L27IN ABSRB UD L36MM CT-1 1/2 CIR J258H

## (undated) DEVICE — SOLUTION IV IRRIG POUR BRL 0.9% SODIUM CHL 2F7124

## (undated) DEVICE — SUTURE ETHBND EXCEL SZ 2-0 L36IN NONABSORBABLE GRN L26MM SH X523H

## (undated) DEVICE — SYSTEM VES HARV ENDOSCP VASOVIEW HEMOPRO

## (undated) DEVICE — PACK SUCT CATHETER 14FR OPN WHSTL W NO VLV

## (undated) DEVICE — CANNULA ART 24FR L14.5IN VENT CONN

## (undated) DEVICE — KIT,ANTI FOG,W/SPONGE & FLUID,SOFT PACK: Brand: MEDLINE

## (undated) DEVICE — PAD GEN USE BORDERED ADH 14IN 2IN AND 12IN 4IN GZ UNIV ST

## (undated) DEVICE — LIGHT HANDLE: Brand: DEVON

## (undated) DEVICE — SUTURE NONABSORBABLE MONOFILAMENT 7-0 BV-1 1X24 IN PROLENE 8702H

## (undated) DEVICE — NEEDLE HYPO 25GA L1.5IN BLU POLYPR HUB S STL REG BVL STR

## (undated) DEVICE — CONNECTOR PERF W0.25XH3/8IN BASE Y SHP REDUC W/O LUERLOCK

## (undated) DEVICE — LEAD PACE 2.6FR L50CM UPLR TEMP ATR NONSTEROID ELUT PIN

## (undated) DEVICE — ADHESIVE SKIN CLSR 0.7ML TOP DERMBND ADV

## (undated) DEVICE — SINGLE USE BIOPSY VALVE MAJ-210: Brand: SINGLE USE BIOPSY VALVE (STERILE)

## (undated) DEVICE — SUTURE PROL SZ 6-0 L24IN NONABSORBABLE BLU L13MM C-1 3/8 8726H

## (undated) DEVICE — SUTURE VCRL + SZ 4-0 L18IN ABSRB UD L19MM PS-2 3/8 CIR PRIM VCP496H

## (undated) DEVICE — DRAIN SURG 24FR L5/16IN DIA8MM SIL RND HUBLESS FULL FLUT

## (undated) DEVICE — AIRLIFE™ ADULT AEROSOL MASK VINYL, UNDER-THE-CHIN STYLE: Brand: AIRLIFE™

## (undated) DEVICE — CANNULA PERF 15FR L12.5IN RG STPCOCK WIREWOUND BODY

## (undated) DEVICE — DRAIN SURG FLAT W7MMXL20CM FULL PERF

## (undated) DEVICE — 3M™ IOBAN™ 2 ANTIMICROBIAL INCISE DRAPE 6650EZ: Brand: IOBAN™ 2

## (undated) DEVICE — SYRINGE MED 50ML LUERSLIP TIP

## (undated) DEVICE — PUNCH AORT BLDE DIA4.4MM LNG HNDL SHRP 2 CUT EDGE FOR COR

## (undated) DEVICE — SUTURE VCRL SZ 0 L27IN ABSRB UD L36MM CT-1 1/2 CIR J260H

## (undated) DEVICE — GAUZE,SPONGE,4"X4",8PLY,STRL,LF,10/TRAY: Brand: MEDLINE

## (undated) DEVICE — JACKSON-PRATT 100CC BULB RESERVOIR: Brand: CARDINAL HEALTH

## (undated) DEVICE — STERILE LATEX POWDER-FREE SURGICAL GLOVESWITH NITRILE COATING: Brand: PROTEXIS

## (undated) DEVICE — PACK PROCEDURE SURG OPN HRT A BASIC

## (undated) DEVICE — SUTURE PROL SZ 4-0 L36IN NONABSORBABLE BLU BB L17MM 3/8 CIR 8581H

## (undated) DEVICE — LEAD PACEMKR MYOCARDIAL UNIPOLAR TEMP

## (undated) DEVICE — STERILE PVP: Brand: MEDLINE INDUSTRIES, INC.

## (undated) DEVICE — CANNULA PERF 7FR L5.5IN AORT ROOT RADPQ STD TIP W/ VENT LN

## (undated) DEVICE — APPLICATOR LNG TP 12.5IN

## (undated) DEVICE — Device: Brand: ZDRIVE™

## (undated) DEVICE — Z DUP USE 2537558 SYSTEM ENDOSCP VES HARV VASO VW HEMOPRO

## (undated) DEVICE — SSC BONE WAX: Brand: SSC BONE WAX

## (undated) DEVICE — SPONGE GZ W4XL4IN COT 12 PLY TYP VII WVN C FLD DSGN

## (undated) DEVICE — PAD,ABDOMINAL,8"X10",ST,LF: Brand: MEDLINE

## (undated) DEVICE — THORACIC CATHETER, STRAIGHT, SILICONE, WITH CLOTSTOP®: Brand: AXIOM® ATRAUM™ WITH CLOTSTOP®

## (undated) DEVICE — YANKAUER,OPEN TIP,W/O VENT,STERILE: Brand: MEDLINE INDUSTRIES, INC.

## (undated) DEVICE — BANDAGE COMPR W3INXL15FT BGE E SGL LAYERED CLP CLSR

## (undated) DEVICE — TELFA NON-ADHERENT ABSORBENT DRESSING: Brand: TELFA